# Patient Record
Sex: MALE | Race: WHITE | NOT HISPANIC OR LATINO
[De-identification: names, ages, dates, MRNs, and addresses within clinical notes are randomized per-mention and may not be internally consistent; named-entity substitution may affect disease eponyms.]

---

## 2017-01-27 PROBLEM — Z00.00 ENCOUNTER FOR PREVENTIVE HEALTH EXAMINATION: Status: ACTIVE | Noted: 2017-01-27

## 2017-01-31 ENCOUNTER — APPOINTMENT (OUTPATIENT)
Dept: POPULATION HEALTH | Facility: CLINIC | Age: 57
End: 2017-01-31

## 2017-02-15 ENCOUNTER — LABORATORY RESULT (OUTPATIENT)
Age: 57
End: 2017-02-15

## 2017-02-16 ENCOUNTER — APPOINTMENT (OUTPATIENT)
Dept: POPULATION HEALTH | Facility: CLINIC | Age: 57
End: 2017-02-16

## 2017-02-16 DIAGNOSIS — R80.9 PROTEINURIA, UNSPECIFIED: ICD-10-CM

## 2018-02-02 ENCOUNTER — APPOINTMENT (OUTPATIENT)
Dept: PULMONOLOGY | Facility: CLINIC | Age: 58
End: 2018-02-02
Payer: COMMERCIAL

## 2018-02-02 VITALS
OXYGEN SATURATION: 92 % | HEIGHT: 72 IN | HEART RATE: 112 BPM | TEMPERATURE: 97.3 F | DIASTOLIC BLOOD PRESSURE: 80 MMHG | RESPIRATION RATE: 16 BRPM | SYSTOLIC BLOOD PRESSURE: 120 MMHG | BODY MASS INDEX: 28.17 KG/M2 | WEIGHT: 208 LBS

## 2018-02-02 PROCEDURE — 99205 OFFICE O/P NEW HI 60 MIN: CPT | Mod: GC

## 2018-03-02 ENCOUNTER — OUTPATIENT (OUTPATIENT)
Dept: OUTPATIENT SERVICES | Facility: HOSPITAL | Age: 58
LOS: 1 days | End: 2018-03-02
Payer: COMMERCIAL

## 2018-03-02 ENCOUNTER — APPOINTMENT (OUTPATIENT)
Dept: SLEEP CENTER | Facility: CLINIC | Age: 58
End: 2018-03-02
Payer: COMMERCIAL

## 2018-03-02 PROCEDURE — 95810 POLYSOM 6/> YRS 4/> PARAM: CPT | Mod: 26

## 2018-03-02 PROCEDURE — 95810 POLYSOM 6/> YRS 4/> PARAM: CPT

## 2018-03-05 DIAGNOSIS — G47.33 OBSTRUCTIVE SLEEP APNEA (ADULT) (PEDIATRIC): ICD-10-CM

## 2018-03-06 ENCOUNTER — RESULT REVIEW (OUTPATIENT)
Age: 58
End: 2018-03-06

## 2019-06-07 ENCOUNTER — APPOINTMENT (OUTPATIENT)
Dept: INFECTIOUS DISEASE | Facility: CLINIC | Age: 59
End: 2019-06-07

## 2019-06-15 ENCOUNTER — EMERGENCY (EMERGENCY)
Facility: HOSPITAL | Age: 59
LOS: 1 days | Discharge: HOME | End: 2019-06-15
Attending: EMERGENCY MEDICINE
Payer: COMMERCIAL

## 2019-06-15 VITALS
SYSTOLIC BLOOD PRESSURE: 114 MMHG | DIASTOLIC BLOOD PRESSURE: 71 MMHG | TEMPERATURE: 99 F | RESPIRATION RATE: 18 BRPM | OXYGEN SATURATION: 95 % | HEART RATE: 95 BPM

## 2019-06-15 VITALS
HEART RATE: 95 BPM | WEIGHT: 175.05 LBS | TEMPERATURE: 98 F | SYSTOLIC BLOOD PRESSURE: 86 MMHG | HEIGHT: 72 IN | RESPIRATION RATE: 17 BRPM | OXYGEN SATURATION: 98 % | DIASTOLIC BLOOD PRESSURE: 51 MMHG

## 2019-06-15 LAB
ALBUMIN SERPL ELPH-MCNC: 3.6 G/DL — SIGNIFICANT CHANGE UP (ref 3.3–5)
ALP SERPL-CCNC: 136 U/L — HIGH (ref 40–120)
ALT FLD-CCNC: 71 U/L — HIGH (ref 10–45)
ANION GAP SERPL CALC-SCNC: 14 MMOL/L — SIGNIFICANT CHANGE UP (ref 5–17)
APPEARANCE UR: ABNORMAL
APTT BLD: 31 SEC — SIGNIFICANT CHANGE UP (ref 27.5–36.3)
AST SERPL-CCNC: 75 U/L — HIGH (ref 10–40)
BACTERIA # UR AUTO: NEGATIVE — SIGNIFICANT CHANGE UP
BASOPHILS # BLD AUTO: 0 K/UL — SIGNIFICANT CHANGE UP (ref 0–0.2)
BASOPHILS NFR BLD AUTO: 0.4 % — SIGNIFICANT CHANGE UP (ref 0–2)
BILIRUB SERPL-MCNC: 0.3 MG/DL — SIGNIFICANT CHANGE UP (ref 0.2–1.2)
BILIRUB UR-MCNC: NEGATIVE — SIGNIFICANT CHANGE UP
BUN SERPL-MCNC: 32 MG/DL — HIGH (ref 7–23)
CALCIUM SERPL-MCNC: 10.7 MG/DL — HIGH (ref 8.4–10.5)
CHLORIDE SERPL-SCNC: 101 MMOL/L — SIGNIFICANT CHANGE UP (ref 96–108)
CO2 SERPL-SCNC: 24 MMOL/L — SIGNIFICANT CHANGE UP (ref 22–31)
COLOR SPEC: YELLOW — SIGNIFICANT CHANGE UP
CREAT SERPL-MCNC: 1.77 MG/DL — HIGH (ref 0.5–1.3)
DIFF PNL FLD: ABNORMAL
EOSINOPHIL # BLD AUTO: 0.1 K/UL — SIGNIFICANT CHANGE UP (ref 0–0.5)
EOSINOPHIL NFR BLD AUTO: 1.1 % — SIGNIFICANT CHANGE UP (ref 0–6)
EPI CELLS # UR: 6 — HIGH
GAS PNL BLDV: SIGNIFICANT CHANGE UP
GLUCOSE SERPL-MCNC: 121 MG/DL — HIGH (ref 70–99)
GLUCOSE UR QL: NEGATIVE — SIGNIFICANT CHANGE UP
GRAN CASTS # UR COMP ASSIST: 4 /LPF — SIGNIFICANT CHANGE UP
HCT VFR BLD CALC: 29.3 % — LOW (ref 39–50)
HGB BLD-MCNC: 9.8 G/DL — LOW (ref 13–17)
HYALINE CASTS # UR AUTO: 13 /LPF — HIGH (ref 0–7)
INR BLD: 1.26 RATIO — HIGH (ref 0.88–1.16)
KETONES UR-MCNC: NEGATIVE — SIGNIFICANT CHANGE UP
LEUKOCYTE ESTERASE UR-ACNC: ABNORMAL
LYMPHOCYTES # BLD AUTO: 1.1 K/UL — SIGNIFICANT CHANGE UP (ref 1–3.3)
LYMPHOCYTES # BLD AUTO: 12.8 % — LOW (ref 13–44)
MCHC RBC-ENTMCNC: 31.1 PG — SIGNIFICANT CHANGE UP (ref 27–34)
MCHC RBC-ENTMCNC: 33.5 GM/DL — SIGNIFICANT CHANGE UP (ref 32–36)
MCV RBC AUTO: 92.8 FL — SIGNIFICANT CHANGE UP (ref 80–100)
MONOCYTES # BLD AUTO: 1.1 K/UL — HIGH (ref 0–0.9)
MONOCYTES NFR BLD AUTO: 13 % — SIGNIFICANT CHANGE UP (ref 2–14)
NEUTROPHILS # BLD AUTO: 6.2 K/UL — SIGNIFICANT CHANGE UP (ref 1.8–7.4)
NEUTROPHILS NFR BLD AUTO: 72.7 % — SIGNIFICANT CHANGE UP (ref 43–77)
NITRITE UR-MCNC: NEGATIVE — SIGNIFICANT CHANGE UP
PH UR: 6.5 — SIGNIFICANT CHANGE UP (ref 5–8)
PLATELET # BLD AUTO: 203 K/UL — SIGNIFICANT CHANGE UP (ref 150–400)
POTASSIUM SERPL-MCNC: 4.9 MMOL/L — SIGNIFICANT CHANGE UP (ref 3.5–5.3)
POTASSIUM SERPL-SCNC: 4.9 MMOL/L — SIGNIFICANT CHANGE UP (ref 3.5–5.3)
PROT SERPL-MCNC: 6.9 G/DL — SIGNIFICANT CHANGE UP (ref 6–8.3)
PROT UR-MCNC: 100 — SIGNIFICANT CHANGE UP
PROTHROM AB SERPL-ACNC: 14.5 SEC — HIGH (ref 10–12.9)
RBC # BLD: 3.15 M/UL — LOW (ref 4.2–5.8)
RBC # FLD: 14.5 % — SIGNIFICANT CHANGE UP (ref 10.3–14.5)
RBC CASTS # UR COMP ASSIST: 19 /HPF — HIGH (ref 0–4)
SODIUM SERPL-SCNC: 139 MMOL/L — SIGNIFICANT CHANGE UP (ref 135–145)
SP GR SPEC: 1.02 — SIGNIFICANT CHANGE UP (ref 1.01–1.02)
UROBILINOGEN FLD QL: NEGATIVE — SIGNIFICANT CHANGE UP
WBC # BLD: 8.5 K/UL — SIGNIFICANT CHANGE UP (ref 3.8–10.5)
WBC # FLD AUTO: 8.5 K/UL — SIGNIFICANT CHANGE UP (ref 3.8–10.5)
WBC UR QL: 14 /HPF — HIGH (ref 0–5)

## 2019-06-15 PROCEDURE — 81001 URINALYSIS AUTO W/SCOPE: CPT

## 2019-06-15 PROCEDURE — 93005 ELECTROCARDIOGRAM TRACING: CPT

## 2019-06-15 PROCEDURE — 71045 X-RAY EXAM CHEST 1 VIEW: CPT | Mod: 26

## 2019-06-15 PROCEDURE — 93010 ELECTROCARDIOGRAM REPORT: CPT

## 2019-06-15 PROCEDURE — 71045 X-RAY EXAM CHEST 1 VIEW: CPT

## 2019-06-15 PROCEDURE — 85730 THROMBOPLASTIN TIME PARTIAL: CPT

## 2019-06-15 PROCEDURE — 82330 ASSAY OF CALCIUM: CPT

## 2019-06-15 PROCEDURE — 82435 ASSAY OF BLOOD CHLORIDE: CPT

## 2019-06-15 PROCEDURE — 80053 COMPREHEN METABOLIC PANEL: CPT

## 2019-06-15 PROCEDURE — 84132 ASSAY OF SERUM POTASSIUM: CPT

## 2019-06-15 PROCEDURE — 82947 ASSAY GLUCOSE BLOOD QUANT: CPT

## 2019-06-15 PROCEDURE — 85014 HEMATOCRIT: CPT

## 2019-06-15 PROCEDURE — 85027 COMPLETE CBC AUTOMATED: CPT

## 2019-06-15 PROCEDURE — 87086 URINE CULTURE/COLONY COUNT: CPT

## 2019-06-15 PROCEDURE — 99284 EMERGENCY DEPT VISIT MOD MDM: CPT | Mod: 25

## 2019-06-15 PROCEDURE — 83605 ASSAY OF LACTIC ACID: CPT

## 2019-06-15 PROCEDURE — 82803 BLOOD GASES ANY COMBINATION: CPT

## 2019-06-15 PROCEDURE — 87040 BLOOD CULTURE FOR BACTERIA: CPT

## 2019-06-15 PROCEDURE — 85610 PROTHROMBIN TIME: CPT

## 2019-06-15 PROCEDURE — 84295 ASSAY OF SERUM SODIUM: CPT

## 2019-06-15 RX ORDER — SODIUM CHLORIDE 9 MG/ML
2500 INJECTION INTRAMUSCULAR; INTRAVENOUS; SUBCUTANEOUS ONCE
Refills: 0 | Status: COMPLETED | OUTPATIENT
Start: 2019-06-15 | End: 2019-06-15

## 2019-06-15 RX ADMIN — SODIUM CHLORIDE 2500 MILLILITER(S): 9 INJECTION INTRAMUSCULAR; INTRAVENOUS; SUBCUTANEOUS at 12:58

## 2019-06-15 NOTE — ED PROVIDER NOTE - PROGRESS NOTE DETAILS
Spoke with Pt's Sister in Law, Dawn Vidal ([phone]). Pt has history of CHI (EF 24%), otherwise history as per reported from [facility]. Agrees most likely cause of NAGA medication induced Spoke with Pt's Sister in Law, Dawn ([phone]). Pt has history of CHI (EF 24%), otherwise history as per reported from [facility]. Agrees most likely cause of NAGA medication induced Ana Mc, DO: D/w Dr. Coleman from [facility] who is covering physician for the weekend. Low clinical concern for obstructive uropathy as patient making appropriate urine. Dr. Coleman to accept patient back. Will d/c with transport to [facility]. Patient and family aware. Ana Mc, DO: D/w Dr. Coleman from [facility] who is covering physician for the weekend. Low clinical concern for obstructive uropathy as patient making appropriate urine. Discussed dc Metoprolol as since start, patient mildly hypotensive. Dr. Coleman to accept patient back. Will d/c with transport to [facility]. Patient and family aware.

## 2019-06-15 NOTE — ED PROVIDER NOTE - CLINICAL SUMMARY MEDICAL DECISION MAKING FREE TEXT BOX
Ana Mc, DO: 59M with AML with hypotension likely related to BB use in setting of tachycardia sent for evaluation of NAGA treated with 75 ml/hour. Will eval for sepsis. NAGA possible 2/2 nephrotoxic agents given vancomycin and other medications such acyclovir. Ana Mc, DO: 59M with AML with hypotension likely related to BB use in setting of tachycardia sent for evaluation of NAGA treated with 75 ml/hour. Will eval for sepsis. NAGA possible 2/2 nephrotoxic agents given vancomycin and other medications such acyclovir.    Nadine Merchant MD - Attending Physician: Pt here with AML, mild hypotension (likely due to new beta blocker) sent in from [facility] for NAGA on labs. Pt asymptomatic, well appearing, normal UOP. Likely medication induced. Eval for infection, d/w PMD

## 2019-06-15 NOTE — ED PROVIDER NOTE - PHYSICAL EXAMINATION
Ana Mc, DO:   SBP 99 with tachycardia to 104, VS WNL otherwise.    Gen: chronically ill appearing, NAD  Head: NCAT  HEENT: PERRL, MMM, normal conjunctiva, anicteric, neck supple  Lung: CTAB, no adventitious sounds, no tachypnea  CV: tachycardia with regular rate  Abd: soft, NTND, no rebound or guarding  MSK: No edema, no visible deformities, wound of RLE dressed in boot.   Neuro: Moving all extremities equally  Skin: Warm and dry, no evidence of rash  Psych: normal mood and affect

## 2019-06-15 NOTE — ED PROVIDER NOTE - ATTENDING CONTRIBUTION TO CARE
Nadine Merchant MD - Attending Physician: I have personally seen and examined this patient with the resident/fellow.  I have fully participated in the care of this patient. I have reviewed all pertinent clinical information, including history, physical exam, plan and the Resident/Fellow’s note and agree except as noted. See MDM

## 2019-06-15 NOTE — ED PROVIDER NOTE - OBJECTIVE STATEMENT
Ana Mc, DO: 59M poor historian with hx of AML with plan for BM transplant currently at [facility] for osteomyelitis sent for evaluation of NAGA. Patient with indwelling saucedo for reasons that the patient does not recall. Denies fever, nausea/vomiting, CP/SOB, cough, change in urine color, abd pain, diarrhea. Per chart review, patient received 1 dose of Toprol 2/2 high heart rate but no hx of HTN or use of antihypertensives.

## 2019-06-15 NOTE — ED ADULT NURSE NOTE - OBJECTIVE STATEMENT
59YOM pmh COPD, leukemia, HTN presents to ED c/o abnormal BUN/creatnine and hypotension from [facility]. Pt has chronic saucedo, per pt normal output. Pt afebrile in the ED. Pt hypotensive 99/57, ususally BP per pt is around 127/70. Pt has been getting vanco at [facility] for toe infection.  Denies CP. fever, chills, SOB, weakness, HA, weakness, N/V/D, abd pain, urinary symptoms. Safety and comfort measures provided. WIll continue to monitor.

## 2019-06-15 NOTE — ED PROVIDER NOTE - NSFOLLOWUPINSTRUCTIONS_ED_ALL_ED_FT
1. You have an NAGA. This was determined not to be due to an obstruction as your urine is draining freely. It is thought to be medication induced.  2. Please follow with your hematologist/oncologist.   3. Please return to the ED should you have any new or worsening symptoms or have any new concerns.   4. Read all attached.

## 2019-06-16 LAB
CULTURE RESULTS: NO GROWTH — SIGNIFICANT CHANGE UP
SPECIMEN SOURCE: SIGNIFICANT CHANGE UP

## 2019-06-17 ENCOUNTER — APPOINTMENT (OUTPATIENT)
Dept: UROLOGY | Facility: CLINIC | Age: 59
End: 2019-06-17
Payer: COMMERCIAL

## 2019-06-17 VITALS
SYSTOLIC BLOOD PRESSURE: 104 MMHG | TEMPERATURE: 98.2 F | RESPIRATION RATE: 16 BRPM | HEIGHT: 72 IN | BODY MASS INDEX: 23.03 KG/M2 | WEIGHT: 170 LBS | HEART RATE: 108 BPM | DIASTOLIC BLOOD PRESSURE: 71 MMHG

## 2019-06-17 DIAGNOSIS — Z85.6 PERSONAL HISTORY OF LEUKEMIA: ICD-10-CM

## 2019-06-17 DIAGNOSIS — R33.9 RETENTION OF URINE, UNSPECIFIED: ICD-10-CM

## 2019-06-17 PROCEDURE — 99204 OFFICE O/P NEW MOD 45 MIN: CPT

## 2019-06-17 NOTE — ASSESSMENT
[FreeTextEntry1] : \par \par Impression/plan: 59 year old male s/p chemotherapy for leukemia who had inpatient urinary retention for ~500cc s/p failed voiding trials. Has been on finasteride and tamsulosin for several weeks now. It is late in the afternoon and he prefers to have morning voiding trial to decrease risk of going to the ER at night time. \par \par 1. Void trial at facility, preferably in the AM.\par 2. Continue tamsulosin and finasteride. \par 3. If fails, will arrange for UDS.

## 2019-06-17 NOTE — PHYSICAL EXAM
[General Appearance - Well Developed] : well developed [General Appearance - Well Nourished] : well nourished [Normal Appearance] : normal appearance [Well Groomed] : well groomed [General Appearance - In No Acute Distress] : no acute distress [Heart Rate And Rhythm] : Heart rate and rhythm were normal [Edema] : no peripheral edema [Respiration, Rhythm And Depth] : normal respiratory rhythm and effort [Exaggerated Use Of Accessory Muscles For Inspiration] : no accessory muscle use [Abdomen Soft] : soft [Abdomen Tenderness] : non-tender [Costovertebral Angle Tenderness] : no ~M costovertebral angle tenderness [Urethral Meatus] : meatus normal [Urinary Bladder Findings] : the bladder was normal on palpation [Scrotum] : the scrotum was normal [Testes Mass (___cm)] : there were no testicular masses [No Prostate Nodules] : no prostate nodules [Normal Station and Gait] : the gait and station were normal for the patient's age [Oriented To Time, Place, And Person] : oriented to person, place, and time [No Focal Deficits] : no focal deficits [] : no rash [Mood] : the mood was normal [Affect] : the affect was normal [Not Anxious] : not anxious [No Palpable Adenopathy] : no palpable adenopathy [FreeTextEntry1] : 16 fr coude catheter in place. KERON small benign.

## 2019-06-17 NOTE — REVIEW OF SYSTEMS
[Feeling Tired] : feeling tired [Recent Weight Loss (___ Lbs)] : recent [unfilled] ~Ulb weight loss [Vomiting] : vomiting [Constipation] : constipation [Loss of interest] : loss of interest in sexual activity [Skin Lesions] : skin lesion [Skin Wound] : skin wound [Anxiety] : anxiety [Limb Weakness] : limb weakness [Difficulty Walking] : difficulty walking [Depression] : depression [Negative] : Psychiatric

## 2019-06-17 NOTE — HISTORY OF PRESENT ILLNESS
[FreeTextEntry1] : 59 year old male with leukemia diagnosed 8 years ago. He spent 6 weeks at [facility] as an inpatient for chemotherapy with wound complications. He was found to have urinary retention and a catheter was left indwelling for the past 6 weeks. He thinks he was retaining for ~500cc. Prior to leukemia diagnosis, he reports some urinary difficulty. He reports 1-2 voids/day, incomplete emptying, and nocturia 1x. Denies stress incontinence. During hospitalization he has been on flomax and finasteride. He failed several voiding trials during hospitalization.

## 2019-06-20 LAB
CULTURE RESULTS: SIGNIFICANT CHANGE UP
CULTURE RESULTS: SIGNIFICANT CHANGE UP
SPECIMEN SOURCE: SIGNIFICANT CHANGE UP
SPECIMEN SOURCE: SIGNIFICANT CHANGE UP

## 2019-06-24 ENCOUNTER — APPOINTMENT (OUTPATIENT)
Dept: UROLOGY | Facility: CLINIC | Age: 59
End: 2019-06-24

## 2019-06-28 ENCOUNTER — OUTPATIENT (OUTPATIENT)
Dept: OUTPATIENT SERVICES | Facility: HOSPITAL | Age: 59
LOS: 1 days | Discharge: HOME | End: 2019-06-28

## 2019-06-28 DIAGNOSIS — C92.00 ACUTE MYELOBLASTIC LEUKEMIA, NOT HAVING ACHIEVED REMISSION: ICD-10-CM

## 2019-07-01 ENCOUNTER — OUTPATIENT (OUTPATIENT)
Dept: OUTPATIENT SERVICES | Facility: HOSPITAL | Age: 59
LOS: 1 days | End: 2019-07-01
Payer: COMMERCIAL

## 2019-07-01 ENCOUNTER — RESULT REVIEW (OUTPATIENT)
Age: 59
End: 2019-07-01

## 2019-07-01 ENCOUNTER — APPOINTMENT (OUTPATIENT)
Dept: HEMATOLOGY ONCOLOGY | Facility: CLINIC | Age: 59
End: 2019-07-01
Payer: COMMERCIAL

## 2019-07-01 VITALS
HEART RATE: 122 BPM | SYSTOLIC BLOOD PRESSURE: 133 MMHG | OXYGEN SATURATION: 98 % | RESPIRATION RATE: 16 BRPM | WEIGHT: 173.06 LBS | DIASTOLIC BLOOD PRESSURE: 84 MMHG | TEMPERATURE: 98 F | BODY MASS INDEX: 23.44 KG/M2 | HEIGHT: 72.01 IN

## 2019-07-01 DIAGNOSIS — C92.00 ACUTE MYELOBLASTIC LEUKEMIA, NOT HAVING ACHIEVED REMISSION: ICD-10-CM

## 2019-07-01 LAB
BASOPHILS # BLD AUTO: 0 K/UL — SIGNIFICANT CHANGE UP (ref 0–0.2)
BASOPHILS NFR BLD AUTO: 0.4 % — SIGNIFICANT CHANGE UP (ref 0–2)
EOSINOPHIL # BLD AUTO: 0.1 K/UL — SIGNIFICANT CHANGE UP (ref 0–0.5)
EOSINOPHIL NFR BLD AUTO: 1.6 % — SIGNIFICANT CHANGE UP (ref 0–6)
HCT VFR BLD CALC: 28.4 % — LOW (ref 39–50)
HGB BLD-MCNC: 9.6 G/DL — LOW (ref 13–17)
LYMPHOCYTES # BLD AUTO: 2.1 K/UL — SIGNIFICANT CHANGE UP (ref 1–3.3)
LYMPHOCYTES # BLD AUTO: 30.5 % — SIGNIFICANT CHANGE UP (ref 13–44)
MCHC RBC-ENTMCNC: 32.1 PG — SIGNIFICANT CHANGE UP (ref 27–34)
MCHC RBC-ENTMCNC: 33.9 G/DL — SIGNIFICANT CHANGE UP (ref 32–36)
MCV RBC AUTO: 94.8 FL — SIGNIFICANT CHANGE UP (ref 80–100)
MONOCYTES # BLD AUTO: 0.6 K/UL — SIGNIFICANT CHANGE UP (ref 0–0.9)
MONOCYTES NFR BLD AUTO: 9.4 % — SIGNIFICANT CHANGE UP (ref 2–14)
NEUTROPHILS # BLD AUTO: 3.9 K/UL — SIGNIFICANT CHANGE UP (ref 1.8–7.4)
NEUTROPHILS NFR BLD AUTO: 58.2 % — SIGNIFICANT CHANGE UP (ref 43–77)
PLATELET # BLD AUTO: 99 K/UL — LOW (ref 150–400)
RBC # BLD: 3 M/UL — LOW (ref 4.2–5.8)
RBC # FLD: 16.4 % — HIGH (ref 10.3–14.5)
WBC # BLD: 6.8 K/UL — SIGNIFICANT CHANGE UP (ref 3.8–10.5)
WBC # FLD AUTO: 6.8 K/UL — SIGNIFICANT CHANGE UP (ref 3.8–10.5)

## 2019-07-01 PROCEDURE — 88291 CYTO/MOLECULAR REPORT: CPT

## 2019-07-01 PROCEDURE — 88275 CYTOGENETICS 100-300: CPT

## 2019-07-01 PROCEDURE — 88184 FLOWCYTOMETRY/ TC 1 MARKER: CPT

## 2019-07-01 PROCEDURE — 87205 SMEAR GRAM STAIN: CPT

## 2019-07-01 PROCEDURE — 88189 FLOWCYTOMETRY/READ 16 & >: CPT

## 2019-07-01 PROCEDURE — 88237 TISSUE CULTURE BONE MARROW: CPT

## 2019-07-01 PROCEDURE — 88271 CYTOGENETICS DNA PROBE: CPT

## 2019-07-01 PROCEDURE — 88264 CHROMOSOME ANALYSIS 20-25: CPT

## 2019-07-01 PROCEDURE — 88280 CHROMOSOME KARYOTYPE STUDY: CPT

## 2019-07-01 PROCEDURE — XXXXX: CPT

## 2019-07-01 PROCEDURE — 88185 FLOWCYTOMETRY/TC ADD-ON: CPT

## 2019-07-01 NOTE — HISTORY OF PRESENT ILLNESS
[de-identified] : This is a 60 y/o male present for an evaluation of a previously diagnosed acute myeloid leukemia. Pt spent 6 weeks in [place], receiving chemotherapy. Wound complications with treatment included toe infection, fungal infection on right cheek, and high fever(105). Pt has a history of multiple blood clots - factor V Leiden runs in family. His most recent clotting - In 2/2019, follow up w/ his regular hematologist revealed everything to be stable. Pt traveled for one month to [place] and [place]. In 4/2019, he returned to his hematologist complaining of SOB. Hewas rushed to the hospital, where they discovered low platelets, but pt states he discharged himself. Pt states his clotting was managed w/ Coumadin in the past, but due to adverse effects, is now currently taking Xarelto. Otherwise, he has no other cardiovascular issues. Pt diagnosed w/ sleep apnea, but refuses to use CPAP. Last colonoscopy completed 10 years ago. Former smoker, 40 years. No longer consumes alcohol, but in the past consumed multiple alcoholic beverages a day.\par Today he presents with 2 family members. He is ambulating via walker. He has completed 3 weeks of rehab at [place] on 6/27. He complains of burning sensation w/ urination. Appetite has been good, gained 5 pounds in a few days, but states taste is impaired.

## 2019-07-01 NOTE — REVIEW OF SYSTEMS
[Recent Change In Weight] : ~T recent weight change [Dysuria] : dysuria [Skin Wound] : skin wound [Negative] : Allergic/Immunologic [FreeTextEntry2] : impaired taste, gain 5 pounds. [FreeTextEntry8] : burning sensation w/ urination. [de-identified] : toe infection.

## 2019-07-01 NOTE — ADDENDUM
[FreeTextEntry1] : Documented by Sb Feng acting as a scribe for Dr. Debbi Coelho on 7/1/2019.\par \par All medical record entries made by the Scribe were at my, Dr. Debbi Coelho's, direction and personally dictated by me on 7/1/2019. I have reviewed the chart and agree that  the record accurately reflects my personal performance of the history, physical exam, assessment and plan. I have also personally directed, reviewed, and agree with the discharge instructions.\par

## 2019-07-01 NOTE — ASSESSMENT
[FreeTextEntry1] : \par Bone marrow biopsy today.\par Need to arrange wound care evaluation.\par Will also reach out to infectious disease.\par MRI in hospital when admitted.\par Bone marrow transplant evaluation.

## 2019-07-02 ENCOUNTER — RESULT REVIEW (OUTPATIENT)
Age: 59
End: 2019-07-02

## 2019-07-02 LAB — TM INTERPRETATION: SIGNIFICANT CHANGE UP

## 2019-07-03 ENCOUNTER — APPOINTMENT (OUTPATIENT)
Dept: CARDIOLOGY | Facility: CLINIC | Age: 59
End: 2019-07-03
Payer: COMMERCIAL

## 2019-07-03 ENCOUNTER — RESULT REVIEW (OUTPATIENT)
Age: 59
End: 2019-07-03

## 2019-07-03 LAB — HEMATOPATHOLOGY REPORT: SIGNIFICANT CHANGE UP

## 2019-07-03 PROCEDURE — 93306 TTE W/DOPPLER COMPLETE: CPT

## 2019-07-11 ENCOUNTER — INPATIENT (INPATIENT)
Facility: HOSPITAL | Age: 59
LOS: 4 days | Discharge: HOME | DRG: 540 | End: 2019-07-16
Attending: INTERNAL MEDICINE | Admitting: INTERNAL MEDICINE
Payer: COMMERCIAL

## 2019-07-11 VITALS
WEIGHT: 178.79 LBS | HEART RATE: 80 BPM | SYSTOLIC BLOOD PRESSURE: 116 MMHG | HEIGHT: 73 IN | RESPIRATION RATE: 18 BRPM | TEMPERATURE: 96 F | DIASTOLIC BLOOD PRESSURE: 76 MMHG

## 2019-07-11 DIAGNOSIS — C92.00 ACUTE MYELOBLASTIC LEUKEMIA, NOT HAVING ACHIEVED REMISSION: ICD-10-CM

## 2019-07-11 DIAGNOSIS — Z86.79 PERSONAL HISTORY OF OTHER DISEASES OF THE CIRCULATORY SYSTEM: ICD-10-CM

## 2019-07-11 DIAGNOSIS — Z29.9 ENCOUNTER FOR PROPHYLACTIC MEASURES, UNSPECIFIED: ICD-10-CM

## 2019-07-11 DIAGNOSIS — B99.9 UNSPECIFIED INFECTIOUS DISEASE: ICD-10-CM

## 2019-07-11 DIAGNOSIS — D68.51 ACTIVATED PROTEIN C RESISTANCE: ICD-10-CM

## 2019-07-11 DIAGNOSIS — C95.90 LEUKEMIA, UNSPECIFIED NOT HAVING ACHIEVED REMISSION: ICD-10-CM

## 2019-07-11 DIAGNOSIS — M86.9 OSTEOMYELITIS, UNSPECIFIED: ICD-10-CM

## 2019-07-11 DIAGNOSIS — B44.9 ASPERGILLOSIS, UNSPECIFIED: ICD-10-CM

## 2019-07-11 LAB
ALBUMIN SERPL ELPH-MCNC: 3.8 G/DL — SIGNIFICANT CHANGE UP (ref 3.3–5)
ALP SERPL-CCNC: 85 U/L — SIGNIFICANT CHANGE UP (ref 40–120)
ALT FLD-CCNC: 17 U/L — SIGNIFICANT CHANGE UP (ref 10–45)
ANION GAP SERPL CALC-SCNC: 12 MMOL/L — SIGNIFICANT CHANGE UP (ref 5–17)
APTT BLD: 27.4 SEC — LOW (ref 27.5–36.3)
AST SERPL-CCNC: 18 U/L — SIGNIFICANT CHANGE UP (ref 10–40)
BASOPHILS # BLD AUTO: 0 K/UL — SIGNIFICANT CHANGE UP (ref 0–0.2)
BASOPHILS NFR BLD AUTO: 0.2 % — SIGNIFICANT CHANGE UP (ref 0–2)
BILIRUB SERPL-MCNC: 0.3 MG/DL — SIGNIFICANT CHANGE UP (ref 0.2–1.2)
BLD GP AB SCN SERPL QL: NEGATIVE — SIGNIFICANT CHANGE UP
BUN SERPL-MCNC: 26 MG/DL — HIGH (ref 7–23)
CALCIUM SERPL-MCNC: 9.1 MG/DL — SIGNIFICANT CHANGE UP (ref 8.4–10.5)
CHLORIDE SERPL-SCNC: 103 MMOL/L — SIGNIFICANT CHANGE UP (ref 96–108)
CO2 SERPL-SCNC: 24 MMOL/L — SIGNIFICANT CHANGE UP (ref 22–31)
CREAT SERPL-MCNC: 1.45 MG/DL — HIGH (ref 0.5–1.3)
EOSINOPHIL # BLD AUTO: 0.1 K/UL — SIGNIFICANT CHANGE UP (ref 0–0.5)
EOSINOPHIL NFR BLD AUTO: 2.4 % — SIGNIFICANT CHANGE UP (ref 0–6)
GLUCOSE SERPL-MCNC: 122 MG/DL — HIGH (ref 70–99)
HCT VFR BLD CALC: 25.6 % — LOW (ref 39–50)
HGB BLD-MCNC: 8.9 G/DL — LOW (ref 13–17)
INR BLD: 1.08 RATIO — SIGNIFICANT CHANGE UP (ref 0.88–1.16)
LDH SERPL L TO P-CCNC: 180 U/L — SIGNIFICANT CHANGE UP (ref 50–242)
LYMPHOCYTES # BLD AUTO: 2.3 K/UL — SIGNIFICANT CHANGE UP (ref 1–3.3)
LYMPHOCYTES # BLD AUTO: 36.8 % — SIGNIFICANT CHANGE UP (ref 13–44)
MAGNESIUM SERPL-MCNC: 1.8 MG/DL — SIGNIFICANT CHANGE UP (ref 1.6–2.6)
MCHC RBC-ENTMCNC: 33.8 PG — SIGNIFICANT CHANGE UP (ref 27–34)
MCHC RBC-ENTMCNC: 34.8 GM/DL — SIGNIFICANT CHANGE UP (ref 32–36)
MCV RBC AUTO: 97.3 FL — SIGNIFICANT CHANGE UP (ref 80–100)
MONOCYTES # BLD AUTO: 0.4 K/UL — SIGNIFICANT CHANGE UP (ref 0–0.9)
MONOCYTES NFR BLD AUTO: 7.2 % — SIGNIFICANT CHANGE UP (ref 2–14)
NEUTROPHILS # BLD AUTO: 3.3 K/UL — SIGNIFICANT CHANGE UP (ref 1.8–7.4)
NEUTROPHILS NFR BLD AUTO: 53.5 % — SIGNIFICANT CHANGE UP (ref 43–77)
PHOSPHATE SERPL-MCNC: 3.9 MG/DL — SIGNIFICANT CHANGE UP (ref 2.5–4.5)
PLATELET # BLD AUTO: 82 K/UL — LOW (ref 150–400)
POTASSIUM SERPL-MCNC: 4.6 MMOL/L — SIGNIFICANT CHANGE UP (ref 3.5–5.3)
POTASSIUM SERPL-SCNC: 4.6 MMOL/L — SIGNIFICANT CHANGE UP (ref 3.5–5.3)
PROT SERPL-MCNC: 6 G/DL — SIGNIFICANT CHANGE UP (ref 6–8.3)
PROTHROM AB SERPL-ACNC: 12.3 SEC — SIGNIFICANT CHANGE UP (ref 10–12.9)
RBC # BLD: 2.63 M/UL — LOW (ref 4.2–5.8)
RBC # FLD: 16.6 % — HIGH (ref 10.3–14.5)
RH IG SCN BLD-IMP: POSITIVE — SIGNIFICANT CHANGE UP
SODIUM SERPL-SCNC: 139 MMOL/L — SIGNIFICANT CHANGE UP (ref 135–145)
URATE SERPL-MCNC: 6.4 MG/DL — SIGNIFICANT CHANGE UP (ref 3.4–8.8)
WBC # BLD: 6.2 K/UL — SIGNIFICANT CHANGE UP (ref 3.8–10.5)
WBC # FLD AUTO: 6.2 K/UL — SIGNIFICANT CHANGE UP (ref 3.8–10.5)

## 2019-07-11 PROCEDURE — 93010 ELECTROCARDIOGRAM REPORT: CPT

## 2019-07-11 PROCEDURE — 99223 1ST HOSP IP/OBS HIGH 75: CPT

## 2019-07-11 PROCEDURE — 73718 MRI LOWER EXTREMITY W/O DYE: CPT | Mod: 26,RT

## 2019-07-11 RX ORDER — VORICONAZOLE 10 MG/ML
1 INJECTION, POWDER, LYOPHILIZED, FOR SOLUTION INTRAVENOUS
Qty: 0 | Refills: 0 | DISCHARGE
Start: 2019-07-11

## 2019-07-11 RX ORDER — METOPROLOL TARTRATE 50 MG
12.5 TABLET ORAL DAILY
Refills: 0 | Status: DISCONTINUED | OUTPATIENT
Start: 2019-07-11 | End: 2019-07-15

## 2019-07-11 RX ORDER — RIVAROXABAN 15 MG-20MG
1 KIT ORAL
Qty: 0 | Refills: 0 | DISCHARGE

## 2019-07-11 RX ORDER — RIVAROXABAN 15 MG-20MG
20 KIT ORAL DAILY
Refills: 0 | Status: DISCONTINUED | OUTPATIENT
Start: 2019-07-11 | End: 2019-07-13

## 2019-07-11 RX ORDER — VORICONAZOLE 10 MG/ML
1 INJECTION, POWDER, LYOPHILIZED, FOR SOLUTION INTRAVENOUS
Qty: 0 | Refills: 0 | DISCHARGE

## 2019-07-11 RX ORDER — CEFTRIAXONE 500 MG/1
1000 INJECTION, POWDER, FOR SOLUTION INTRAMUSCULAR; INTRAVENOUS EVERY 24 HOURS
Refills: 0 | Status: DISCONTINUED | OUTPATIENT
Start: 2019-07-11 | End: 2019-07-13

## 2019-07-11 RX ORDER — SODIUM CHLORIDE 9 MG/ML
1000 INJECTION INTRAMUSCULAR; INTRAVENOUS; SUBCUTANEOUS
Refills: 0 | Status: DISCONTINUED | OUTPATIENT
Start: 2019-07-11 | End: 2019-07-16

## 2019-07-11 RX ORDER — VORICONAZOLE 10 MG/ML
200 INJECTION, POWDER, LYOPHILIZED, FOR SOLUTION INTRAVENOUS EVERY 12 HOURS
Refills: 0 | Status: DISCONTINUED | OUTPATIENT
Start: 2019-07-11 | End: 2019-07-16

## 2019-07-11 RX ORDER — SERTRALINE 25 MG/1
25 TABLET, FILM COATED ORAL DAILY
Refills: 0 | Status: DISCONTINUED | OUTPATIENT
Start: 2019-07-11 | End: 2019-07-15

## 2019-07-11 RX ORDER — NICOTINE POLACRILEX 2 MG
1 GUM BUCCAL DAILY
Refills: 0 | Status: DISCONTINUED | OUTPATIENT
Start: 2019-07-11 | End: 2019-07-16

## 2019-07-11 RX ADMIN — CEFTRIAXONE 100 MILLIGRAM(S): 500 INJECTION, POWDER, FOR SOLUTION INTRAMUSCULAR; INTRAVENOUS at 17:30

## 2019-07-11 RX ADMIN — VORICONAZOLE 200 MILLIGRAM(S): 10 INJECTION, POWDER, LYOPHILIZED, FOR SOLUTION INTRAVENOUS at 17:30

## 2019-07-11 RX ADMIN — SODIUM CHLORIDE 100 MILLILITER(S): 9 INJECTION INTRAMUSCULAR; INTRAVENOUS; SUBCUTANEOUS at 16:30

## 2019-07-11 NOTE — H&P ADULT - PROBLEM SELECTOR PLAN 2
h/o of fungal infection of cheek.   Continue with Voriconazole Continue with Voriconazole 2/2 h/o fungal infection of face

## 2019-07-11 NOTE — CHART NOTE - NSCHARTNOTEFT_GEN_A_CORE
Wound Care Team Note:    NP request for wound care consult on patient with foot wound. This request was referred to the wound care team Podiatrist, Dr. Estrada.    Kelli Andrade, YADIRA-C, [facility] [id number]

## 2019-07-11 NOTE — CONSULT NOTE ADULT - ASSESSMENT
Mr. Delong is a 59 year old male with AML previously on anthracycline based chemotheraphy, with a reported EF in the 20's at this time, who now presents for chemotherapy and evaluation of a toe infection.  More recently, he had an echocardiogram in our office 7/2019 with mild MR, eccentric LVH with moderate LV dysfunction (EF 35-40%), and mild aortic root dilatation.  He also has a history of Factor V Leiden and VTE, and has been on Xarelto.    - No sign of acute ischemia, nor does he have any chest pain. Please repeat EKG today. His prior was abnormal with a RBBB, LAD, RVH and non-specific st abn. He reports a cath about 5 years ago at [facility] that was unremarkable and a stress test more recently, though results of this are unavailable.  - No sign of volume overload. I would prefer to start him on Toprol XL 25 in the setting of lv dysfunction, though was can start at 1/2 the dose and see if his bp tolerates.  - He had NAGA during his last blood work available, so we need to hold off on ace-inhibitor, especially in the setting of borderline BP.  - MUGA is pending  - Continue Xarelto for history of VTE if platelets remain >50,000  - Watch creatinine and electrolytes. Keep K>4, Mg>2  - Will follow with you.
58 yo M presents with right hallux medial nail bed wound to bone   - pt was seen and evaluated   - VSS, no leukocytosis  - right hallux nail was detached, easily removed the nail, revealed medial nail bed wound to bone, no pus, no malodor. Ductylitis and local erythema noted  - ordered ESR, CRP, Xray and MRI for OM evaluation   - wound culture taken   - podiatry will follow   - d/w attending
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

## 2019-07-11 NOTE — H&P ADULT - PROBLEM SELECTOR PLAN 6
Admit for cycle 1 consolidation HIDAC  Monitor for cerebellar toxicity  pred forte eye drops  Monitor labs, replace blood and lytes PRN  Pain control, antiemetics, IV hydration

## 2019-07-11 NOTE — H&P ADULT - HISTORY OF PRESENT ILLNESS
58yo M with AML diagnosed in 4/2019 s/p induction chemotherapy now in   morphologic remission admitted for consolidation chemotherapy with cycle 1 HIDAC.    During hospitalization for induction chemotherapy at [facility], patient   developed wound complications including a toe infection and right cheek fungal   infection associated with gihg fevers of 105. After hospitalization, patient was   diacharged to Subacute rehab for 3 weeks and is now ambulating with a walker.   Patient also has a history of sleep apnea (refusing CPAP), prior use of etoh, blood   clots (family history of Factor V Leiden) previously on Coumadin but due to adverse   effects was changed to xarelto 58yo M with AML diagnosed in 4/2019 s/p induction chemotherapy now in   morphologic remission admitted for consolidation chemotherapy with cycle 1 HIDAC.    Patient was initially followed by Heme for factor V Leiden on Xarelto. Labs were normal at check up. Had increased SOB and had repeat labs done and was told to go to ER. Patient went to [facility] hospital due to insurance reasons. He was diagnosed with AML and underwent treated with induction chemotherapy7+3. Day 14 BMbx showed PD and patient was reinduced with 5+2 at which time he developed a wound on the right tow with concern for osteomyelitis as well asright cheek fungal  infection with aspergillosis and neutropenic fevers of 105. After hospitalization, patient was  discharged to Subacute rehab for 3 weeks. After discharge from rehab due to insurance reasons, the patient was seen at [facility] by Dr. Coelho. BMbx showed a CR and plan was made for consolidation chemo with HIDAC. Patient will need an Allo transplant and has and appointment with Dr. Gaytan in August. The patient is a poor historian but was able to state he had heart failure after the chemo and his EF dropped to 20%. Patient also has a history of sleep apnea (refusing CPAP), prior use of etoh, blood   clots (family history of Factor V Leiden).    effects was changed to xarelto 58yo M with AML diagnosed in 4/2019 s/p induction chemotherapy   now in morphologic remission admitted for consolidation chemotherapy   with cycle 1 HIDAC.    Patient was initially followed by [facility] for factor V Leiden on Xarelto.   Labs were normal at check up. Had increased SOB and had repeat labs   done and was told to go to ER. Patient went to [facility] hospital due to insurance   reasons. He was diagnosed with AML and underwent treated with induction   chemotherapy7+3. Day 14 BMbx showed PD and patient was reinduced   with 5+2 at which time he developed a wound on the right tow with concern   for osteomyelitis as well asright cheek fungal  infection with aspergillosis and neutropenic fevers of 105.   After hospitalization, patient wasdischarged to Subacute rehab for 3   weeks. After discharge from rehab due to insurance reasons, the   patient was seen at [facility] by Dr. Coelho. BMbx showed a CR and plan   was made for consolidation chemo with HIDAC. Patient will need an   Allo transplant and has and appointment with Dr. Gaytan in August.   The patient is a poor historian but was able to state he had heart failure   after the chemo and his EF dropped to 20%. Patient also has a history   of sleep apnea (refusing CPAP), prior use of etoh, blood   clots (family history of Factor V Leiden).    effects was changed to xarelto

## 2019-07-11 NOTE — CONSULT NOTE ADULT - PROBLEM SELECTOR RECOMMENDATION 9
Having failed Vanco patient certainly at risk of losing the toe, recommend ESR (will order), MRI of the toe, ceftriaxone 1 gram IV Q-day for now with further recs to follow as clinical picture is clarified. Would involve podiatry as high likelihood in this context for amputation being required

## 2019-07-11 NOTE — H&P ADULT - NSHPSOCIALHISTORY_GEN_ALL_CORE
Patient is  and lives with a significant other. He drank 6-8 drinks a day until recently. He is a current smoker but has been using a nicotine patch. He works as a  but on disability at this time and has 4 grown children.

## 2019-07-11 NOTE — CONSULT NOTE ADULT - SUBJECTIVE AND OBJECTIVE BOX
[facility] [place] [facility] - Ede Zamudio, Riki, Edgar, Gem, Tabitha Garcia  Office Number: [phone]    Initial Consult Note    CHIEF COMPLAINT: Patient is a 59y old  Male who presents for oncologic evaluation    HPI:  60yo M with AML diagnosed in 4/2019 s/p induction chemotherapy now in   morphologic remission admitted for consolidation chemotherapy with cycle 1 HIDAC.    During hospitalization for induction chemotherapy at [facility], patient   developed wound complications including a toe infection and right cheek fungal   infection associated with gihg fevers of 105. After hospitalization, patient was   diacharged to Subacute rehab for 3 weeks and is now ambulating with a walker.   Patient also has a history of sleep apnea (refusing CPAP), prior use of etoh, blood   clots (family history of Factor V Leiden) previously on Coumadin but due to adverse   effects was changed to xarelto (11 Jul 2019 11:25)    He reports an EF in the 20% range post anthracycline based chemotherapy.  He was recommended to see Dr. Lyn in our office and has an appt next week.  He had been on metoprolol in the past, though recently changed it to atenolol because of a borderline BP. His girlfriend reports that his metoprolol was often held at rehab because of hypotension.  He also has a history of Factor V Leiden and DVT, and has been on Xarelo, though is unaware of the dose.  He had an echocardiogram in our office 7/2019 with mild MR, eccentric LVH with moderate LV dysfunction (EF 35-40%), and mild aortic root dilatation.  He reports a cath about 5 years ago at [facility] that was unremarkable and a stress test more recently.      PAST MEDICAL & SURGICAL HISTORY:  dvt on xarelto  fast heart rates    SOCIAL HISTORY:  + former smoker, no alcohol or drug use    FAMILY HISTORY:    No family history of acute MI or sudden cardiac death.    MEDICATIONS  (STANDING):    MEDICATIONS  (PRN):      Allergies    No Known Allergies    Intolerances        REVIEW OF SYSTEMS:    CONSTITUTIONAL: No weakness, + fevers, no chills  EYES/ENT: No visual changes;  No vertigo or throat pain   NECK: No pain or stiffness  RESPIRATORY: No cough, wheezing, hemoptysis; No shortness of breath  CARDIOVASCULAR: No chest pain or palpitations  GASTROINTESTINAL: No abdominal pain. No nausea, vomiting, or hematemesis; No diarrhea or constipation. No melena or hematochezia.  GENITOURINARY: No dysuria, frequency or hematuria  NEUROLOGICAL: No numbness or weakness  SKIN: No itching or rash  All other review of systems is negative unless indicated above    VITAL SIGNS:   Vital Signs Last 24 Hrs  T(C): 35.3 (11 Jul 2019 10:47), Max: 35.3 (11 Jul 2019 10:47)  T(F): 95.5 (11 Jul 2019 10:47), Max: 95.5 (11 Jul 2019 10:47)  HR: 80 (11 Jul 2019 10:47) (80 - 80)  BP: 116/76 (11 Jul 2019 10:47) (116/76 - 116/76)  BP(mean): --  RR: 18 (11 Jul 2019 10:47) (18 - 18)  SpO2: --    I&O's Summary      On Exam:    Constitutional: NAD, alert and oriented x 3  Lungs:  Non-labored, breath sounds are clear bilaterally, No wheezing, rales or rhonchi  Cardiovascular: RR with occasional premature beats.  S1 and S2 positive.  No murmurs, rubs, gallops or clicks  Gastrointestinal: Bowel Sounds present, soft, nontender.   Lymph: No peripheral edema; toe wrapped. No cervical lymphadenopathy.  Neurological: Alert, no focal deficits  Skin: No rashes or ulcers   Psych:  Mood & affect appropriate.    LABS: All Labs Reviewed:                Blood Culture:         RADIOLOGY:    EKG: sr, pvc, rbbb, rvh, lad, non-specific t wave abn
HPI:  60yo M with AML diagnosed in 4/2019 at [facility] s/p induction chemotherapy now in morphologic remission admitted for consolidation chemotherapy with cycle 1 HIDAC.    Patient was initially followed by [facility] for factor V Leiden on Xarelto. Labs were normal at check up. Had increased SOB and had repeat labs done and was told to go to ER. Patient went to [facility] hospital due to insurance reasons. He was diagnosed with AML and underwent treated with induction chemotherapy 7+3. Day 14 BMbx showed PD and patient was reinduced with 5+2 at which time he developed a wound on the right toe with concern for osteomyelitis as well as right cheek fungal infection with aspergillosis and neutropenic fevers of 105. 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 After discharge from rehab due to insurance reasons, the patient was seen at [facility] by [place]. Coelho. BMbx showed a CR and plan was made for consolidation chemo with HIDAC. Patient will need an Allo transplant and has and appointment with Dr. Gaytan in August. The patient is a poor historian but was able to state he had heart failure after the chemo and his EF dropped to 20%.       PAST MEDICAL & SURGICAL HISTORY:  H/O cardiomyopathy  Factor 5 Leiden mutation, heterozygous  AML      Antimicrobials  voriconazole 200 milliGRAM(s) Oral every 12 hours      Immunological      Other  metoprolol succinate ER 12.5 milliGRAM(s) Oral daily  rivaroxaban 20 milliGRAM(s) Oral daily  sertraline 25 milliGRAM(s) Oral daily  sodium chloride 0.9%. 1000 milliLiter(s) IV Continuous <Continuous>      Allergies    No Known Allergies    Intolerances      SOCIAL HISTORY: current tobacco use    FAMILY HISTORY: noncontrib      ROS:    EYES:  Negative  blurry vision or double vision  GASTROINTESTINAL:  Negative for nausea, vomiting, diarrhea  -otherwise negative except for subjective    Vital Signs Last 24 Hrs  T(C): 36 (11 Jul 2019 13:12), Max: 36 (11 Jul 2019 13:12)  T(F): 96.8 (11 Jul 2019 13:12), Max: 96.8 (11 Jul 2019 13:12)  HR: 74 (11 Jul 2019 13:12) (74 - 80)  BP: 104/73 (11 Jul 2019 13:12) (104/73 - 116/76)  BP(mean): --  RR: 18 (11 Jul 2019 13:12) (18 - 18)  SpO2: 99% (11 Jul 2019 13:12) (99% - 99%)    PE:  WDWN in no distress  HEENT:  NC, PERRL, sclerae anicteric, conjunctivae clear, EOMI.  Sinuses nontender, no nasal exudate.  No buccal or pharyngeal lesions, erythema or exudate  Neck:  Supple, no adenopathy  Lungs:  No accessory muscle use, bilaterally clear to auscultation  Cor:  RRR, S1, S2, no murmur appreciated  Abd:  Symmetric, normoactive BS.  Soft, nontender, no masses, guarding or rebound.  Liver and spleen not enlarged  Extrem/Skin: no clubbing, no cyanosis, right great toe with nail lifting off, swollen warm, red  Neuro: grossly intact  Musc: moving all limbs freely, no focal deficits    LABS:                              MICROBIOLOGY:      RADIOLOGY & ADDITIONAL STUDIES:    --
Podiatry pager #: [facility] [phone]/ [facility] [id number]    Patient is a 59y old  Male who presents with a chief complaint of Cycle 1 HIDAC (11 Jul 2019 13:19)      HPI:  58yo M with AML diagnosed in 4/2019 s/p induction chemotherapy   now in morphologic remission admitted for consolidation chemotherapy   with cycle 1 HIDAC. Patient injured his right hallux month ago while he was in [facility]. He had prior IV abx to treat that hallux toe infection but failed.     Patient was initially followed by [facility] for factor V Leiden on Xarelto.   Labs were normal at check up. Had increased SOB and had repeat labs   done and was told to go to ER. Patient went to [facility] hospital due to insurance   reasons. He was diagnosed with AML and underwent treated with induction   chemotherapy7+3. Day 14 BMbx showed PD and patient was reinduced   with 5+2 at which time he developed a wound on the right tow with concern   for osteomyelitis as well asright cheek fungal  infection with aspergillosis and neutropenic fevers of 105.   After hospitalization, patient wasdischarged to Subacute rehab for 3   weeks. After discharge from rehab due to insurance reasons, the   patient was seen at [facility] by Dr. Coelho. BMbx showed a CR and plan   was made for consolidation chemo with HIDAC. Patient will need an   Allo transplant and has and appointment with Dr. Gaytan in August.   The patient is a poor historian but was able to state he had heart failure   after the chemo and his EF dropped to 20%. Patient also has a history   of sleep apnea (refusing CPAP), prior use of etoh, blood   clots (family history of Factor V Leiden).    effects was changed to xarelto (11 Jul 2019 11:25)      PAST MEDICAL & SURGICAL HISTORY:  H/O cardiomyopathy  Factor 5 Leiden mutation, heterozygous      MEDICATIONS  (STANDING):  cefTRIAXone   IVPB 1000 milliGRAM(s) IV Intermittent every 24 hours  metoprolol succinate ER 12.5 milliGRAM(s) Oral daily  rivaroxaban 20 milliGRAM(s) Oral daily  sertraline 25 milliGRAM(s) Oral daily  sodium chloride 0.9%. 1000 milliLiter(s) (100 mL/Hr) IV Continuous <Continuous>  voriconazole 200 milliGRAM(s) Oral every 12 hours    MEDICATIONS  (PRN):      Allergies    No Known Allergies    Intolerances        VITALS:    Vital Signs Last 24 Hrs  T(C): 36 (11 Jul 2019 13:12), Max: 36 (11 Jul 2019 13:12)  T(F): 96.8 (11 Jul 2019 13:12), Max: 96.8 (11 Jul 2019 13:12)  HR: 74 (11 Jul 2019 13:12) (74 - 80)  BP: 104/73 (11 Jul 2019 13:12) (104/73 - 116/76)  BP(mean): --  RR: 18 (11 Jul 2019 13:12) (18 - 18)  SpO2: 99% (11 Jul 2019 13:12) (99% - 99%)    LABS:                          8.9    6.2   )-----------( 82       ( 11 Jul 2019 14:39 )             25.6       07-11    139  |  103  |  26<H>  ----------------------------<  122<H>  4.6   |  24  |  1.45<H>    Ca    9.1      11 Jul 2019 14:39  Phos  3.9     07-11  Mg     1.8     07-11    TPro  6.0  /  Alb  3.8  /  TBili  0.3  /  DBili  x   /  AST  18  /  ALT  17  /  AlkPhos  85  07-11      CAPILLARY BLOOD GLUCOSE          PT/INR - ( 11 Jul 2019 14:39 )   PT: 12.3 sec;   INR: 1.08 ratio         PTT - ( 11 Jul 2019 14:39 )  PTT:27.4 sec    LOWER EXTREMITY PHYSICAL EXAM:    Vasular: DP/PT 2/4, B/L, CFT <5 seconds B/L, Temperature gradient normal, B/L.   Neuro: Epicritic sensation intact to the level of digits, B/L.  Musculoskeletal/Ortho: no gross deformity bl  Skin: right hallux nail is detached from the nail bed. The nail was easily removed, then reviewed a medial nail bed wound to the bone. Dactylitis and localized erythema noted to the hallux. No pus, no malodor.   etiology: patient injured the nail months ago     RADIOLOGY & ADDITIONAL STUDIES:

## 2019-07-12 ENCOUNTER — TRANSCRIPTION ENCOUNTER (OUTPATIENT)
Age: 59
End: 2019-07-12

## 2019-07-12 LAB
ALBUMIN SERPL ELPH-MCNC: 3.2 G/DL — LOW (ref 3.3–5)
ALP SERPL-CCNC: 81 U/L — SIGNIFICANT CHANGE UP (ref 40–120)
ALT FLD-CCNC: 15 U/L — SIGNIFICANT CHANGE UP (ref 10–45)
ANION GAP SERPL CALC-SCNC: 10 MMOL/L — SIGNIFICANT CHANGE UP (ref 5–17)
AST SERPL-CCNC: 15 U/L — SIGNIFICANT CHANGE UP (ref 10–40)
BASOPHILS # BLD AUTO: 0 K/UL — SIGNIFICANT CHANGE UP (ref 0–0.2)
BASOPHILS NFR BLD AUTO: 0.2 % — SIGNIFICANT CHANGE UP (ref 0–2)
BILIRUB SERPL-MCNC: 0.3 MG/DL — SIGNIFICANT CHANGE UP (ref 0.2–1.2)
BLD GP AB SCN SERPL QL: NEGATIVE — SIGNIFICANT CHANGE UP
BUN SERPL-MCNC: 19 MG/DL — SIGNIFICANT CHANGE UP (ref 7–23)
CALCIUM SERPL-MCNC: 8.9 MG/DL — SIGNIFICANT CHANGE UP (ref 8.4–10.5)
CHLORIDE SERPL-SCNC: 104 MMOL/L — SIGNIFICANT CHANGE UP (ref 96–108)
CO2 SERPL-SCNC: 25 MMOL/L — SIGNIFICANT CHANGE UP (ref 22–31)
CREAT SERPL-MCNC: 1.45 MG/DL — HIGH (ref 0.5–1.3)
CRP SERPL-MCNC: 0.96 MG/DL — HIGH (ref 0–0.4)
EOSINOPHIL # BLD AUTO: 0.1 K/UL — SIGNIFICANT CHANGE UP (ref 0–0.5)
EOSINOPHIL NFR BLD AUTO: 2 % — SIGNIFICANT CHANGE UP (ref 0–6)
ERYTHROCYTE [SEDIMENTATION RATE] IN BLOOD: 33 MM/HR — HIGH (ref 0–20)
GLUCOSE SERPL-MCNC: 104 MG/DL — HIGH (ref 70–99)
HCT VFR BLD CALC: 25.2 % — LOW (ref 39–50)
HCV AB S/CO SERPL IA: 0.08 S/CO — SIGNIFICANT CHANGE UP (ref 0–0.99)
HCV AB SERPL-IMP: SIGNIFICANT CHANGE UP
HGB BLD-MCNC: 8.8 G/DL — LOW (ref 13–17)
LYMPHOCYTES # BLD AUTO: 2.5 K/UL — SIGNIFICANT CHANGE UP (ref 1–3.3)
LYMPHOCYTES # BLD AUTO: 42.1 % — SIGNIFICANT CHANGE UP (ref 13–44)
MAGNESIUM SERPL-MCNC: 1.8 MG/DL — SIGNIFICANT CHANGE UP (ref 1.6–2.6)
MCHC RBC-ENTMCNC: 33.6 PG — SIGNIFICANT CHANGE UP (ref 27–34)
MCHC RBC-ENTMCNC: 35.1 GM/DL — SIGNIFICANT CHANGE UP (ref 32–36)
MCV RBC AUTO: 95.7 FL — SIGNIFICANT CHANGE UP (ref 80–100)
MONOCYTES # BLD AUTO: 0.5 K/UL — SIGNIFICANT CHANGE UP (ref 0–0.9)
MONOCYTES NFR BLD AUTO: 8.5 % — SIGNIFICANT CHANGE UP (ref 2–14)
NEUTROPHILS # BLD AUTO: 2.8 K/UL — SIGNIFICANT CHANGE UP (ref 1.8–7.4)
NEUTROPHILS NFR BLD AUTO: 47.3 % — SIGNIFICANT CHANGE UP (ref 43–77)
PHOSPHATE SERPL-MCNC: 3.9 MG/DL — SIGNIFICANT CHANGE UP (ref 2.5–4.5)
PLATELET # BLD AUTO: 92 K/UL — LOW (ref 150–400)
POTASSIUM SERPL-MCNC: 4.2 MMOL/L — SIGNIFICANT CHANGE UP (ref 3.5–5.3)
POTASSIUM SERPL-SCNC: 4.2 MMOL/L — SIGNIFICANT CHANGE UP (ref 3.5–5.3)
PROT SERPL-MCNC: 5.7 G/DL — LOW (ref 6–8.3)
RBC # BLD: 2.63 M/UL — LOW (ref 4.2–5.8)
RBC # FLD: 16.8 % — HIGH (ref 10.3–14.5)
RH IG SCN BLD-IMP: POSITIVE — SIGNIFICANT CHANGE UP
SODIUM SERPL-SCNC: 139 MMOL/L — SIGNIFICANT CHANGE UP (ref 135–145)
WBC # BLD: 5.9 K/UL — SIGNIFICANT CHANGE UP (ref 3.8–10.5)
WBC # FLD AUTO: 5.9 K/UL — SIGNIFICANT CHANGE UP (ref 3.8–10.5)

## 2019-07-12 PROCEDURE — 99233 SBSQ HOSP IP/OBS HIGH 50: CPT | Mod: GC

## 2019-07-12 PROCEDURE — 73630 X-RAY EXAM OF FOOT: CPT | Mod: 26,RT

## 2019-07-12 PROCEDURE — 70220 X-RAY EXAM OF SINUSES: CPT | Mod: 26

## 2019-07-12 PROCEDURE — 99232 SBSQ HOSP IP/OBS MODERATE 35: CPT

## 2019-07-12 PROCEDURE — 78472 GATED HEART PLANAR SINGLE: CPT | Mod: 26

## 2019-07-12 RX ADMIN — Medication 1 PATCH: at 20:09

## 2019-07-12 RX ADMIN — VORICONAZOLE 200 MILLIGRAM(S): 10 INJECTION, POWDER, LYOPHILIZED, FOR SOLUTION INTRAVENOUS at 18:06

## 2019-07-12 RX ADMIN — RIVAROXABAN 20 MILLIGRAM(S): KIT at 11:54

## 2019-07-12 RX ADMIN — CEFTRIAXONE 100 MILLIGRAM(S): 500 INJECTION, POWDER, FOR SOLUTION INTRAMUSCULAR; INTRAVENOUS at 17:01

## 2019-07-12 RX ADMIN — Medication 12.5 MILLIGRAM(S): at 05:47

## 2019-07-12 RX ADMIN — SERTRALINE 25 MILLIGRAM(S): 25 TABLET, FILM COATED ORAL at 11:54

## 2019-07-12 RX ADMIN — Medication 1 PATCH: at 21:23

## 2019-07-12 RX ADMIN — SODIUM CHLORIDE 100 MILLILITER(S): 9 INJECTION INTRAMUSCULAR; INTRAVENOUS; SUBCUTANEOUS at 20:10

## 2019-07-12 RX ADMIN — SODIUM CHLORIDE 100 MILLILITER(S): 9 INJECTION INTRAMUSCULAR; INTRAVENOUS; SUBCUTANEOUS at 05:47

## 2019-07-12 RX ADMIN — VORICONAZOLE 200 MILLIGRAM(S): 10 INJECTION, POWDER, LYOPHILIZED, FOR SOLUTION INTRAVENOUS at 05:47

## 2019-07-12 RX ADMIN — Medication 1 PATCH: at 00:52

## 2019-07-12 RX ADMIN — SODIUM CHLORIDE 100 MILLILITER(S): 9 INJECTION INTRAMUSCULAR; INTRAVENOUS; SUBCUTANEOUS at 00:53

## 2019-07-12 NOTE — PROGRESS NOTE ADULT - SUBJECTIVE AND OBJECTIVE BOX
Podiatry pager #: [facility] [phone]/ [facility] [phone]    Patient is a 59y old  Male who presents with a chief complaint of Cycle 1 HIDAC (12 Jul 2019 12:41)       INTERVAL HPI/OVERNIGHT EVENTS:  Patient seen and evaluated at bedside.  Pt is resting comfortable in NAD. Denies N/V/F/C.      Allergies    No Known Allergies    Intolerances        Vital Signs Last 24 Hrs  T(C): 36.4 (12 Jul 2019 13:40), Max: 37 (11 Jul 2019 17:36)  T(F): 97.6 (12 Jul 2019 13:40), Max: 98.6 (11 Jul 2019 17:36)  HR: 89 (12 Jul 2019 13:40) (68 - 91)  BP: 118/71 (12 Jul 2019 13:40) (96/59 - 122/70)  BP(mean): --  RR: 18 (12 Jul 2019 13:40) (18 - 18)  SpO2: 98% (12 Jul 2019 13:40) (96% - 99%)    LABS:                        8.8    5.9   )-----------( 92       ( 12 Jul 2019 07:06 )             25.2     07-12    139  |  104  |  19  ----------------------------<  104<H>  4.2   |  25  |  1.45<H>    Ca    8.9      12 Jul 2019 07:05  Phos  3.9     07-12  Mg     1.8     07-12    TPro  5.7<L>  /  Alb  3.2<L>  /  TBili  0.3  /  DBili  x   /  AST  15  /  ALT  15  /  AlkPhos  81  07-12    PT/INR - ( 11 Jul 2019 14:39 )   PT: 12.3 sec;   INR: 1.08 ratio         PTT - ( 11 Jul 2019 14:39 )  PTT:27.4 sec    CAPILLARY BLOOD GLUCOSE          Lower Extremity Physical Exam:  Vasular: DP/PT 2/4, B/L, CFT <5 seconds B/L, Temperature gradient normal, B/L.   Neuro: Epicritic sensation intact to the level of digits, B/L.  Musculoskeletal/Ortho: no gross deformity bl  Skin: right hallux nail bed wound to bone, no active drainage, no pus, no malodor. There is slight erythema and mild dactylitis to the right hallux.   etiology: patient injured the nail months ago     RADIOLOGY & ADDITIONAL TESTS:  < from: MR Foot No Cont, Right (07.11.19 @ 20:20) >    EXAM:  MR FOOT RT                            PROCEDURE DATE:  07/11/2019            INTERPRETATION:    RIGHT FOOT MRI    CLINICAL INFORMATION: Right first toe wound to bone, suspected   osteomyelitis.  TECHNIQUE: Multiplanar, multisequence MRI was obtained of the right foot.   Images were obtained through the forefoot.  COMPARISON: None available    FINDINGS:    There is high T2 and low T1 signal of the distal phalanx of the great   toe. The high T2 signal involves nearly the entirety of the digit,   however the low T1 signal does not extend to involve the entirety of the   distal phalanx, the base is spared of low T1 signal. The reported   overlying skin skin wound is not well appreciated. There may be an   associated fracture at the tuft of the distal phalanx There is no   associated joint effusion of the interphalangeal joint. There are no   other areas of marrow signal alteration which are suspicious for   osteomyelitis.    The flexor and extensor tendons appear intact. The Lisfrancligament   appears intact.    There is dorsal subcutaneous soft tissue edema. There is muscular edema   and atrophy, consistent with denervation. There is no fluid collection   identified.      IMPRESSION:    Signal alteration of the distal phalanx of the great toe. Given reported   physical exam findings of overlying wound extending to bone, this is   suspicious osteomyelitis. There may be superimposed fracture of the tuft   of the distal phalanx. X-ray correlation is recommended.                LILIAM HARDING MD,RADIOLOGY FELLOW  This document has been electronically signed.  JUSTIN MULLINS M.D., ATTENDING RADIOLOGIST  This document has been electronically signed. Jul 12 2019  9:49AM    < end of copied text >

## 2019-07-12 NOTE — PHYSICAL THERAPY INITIAL EVALUATION ADULT - PERTINENT HX OF CURRENT PROBLEM, REHAB EVAL
60yo M with AML diagnosed in 4/2019 s/p induction chemotherapy now in morphologic remission admitted for consolidation chemotherapy  with cycle 1 HIDAC.

## 2019-07-12 NOTE — PHYSICAL THERAPY INITIAL EVALUATION ADULT - CRITERIA FOR SKILLED THERAPEUTIC INTERVENTIONS
anticipated discharge recommendation/anticipated equipment needs at discharge/impairments found/functional limitations in following categories/therapy frequency/risk reduction/prevention/rehab potential/predicted duration of therapy intervention

## 2019-07-12 NOTE — PROGRESS NOTE ADULT - ASSESSMENT
60 yo M presents with right hallux wound to bone  - vitals stable, no WBC, ESR 33  - MRI: showing OM to the right hallux distal phalanx   - wound probe to bone with fibrotic tissue, no active drainage, no pus, no malodor. Mild cellulitis and dactylitis to the hallux   - pending wound culture   - ID is considering sending the patient home with PICC to manage the OM  - patient has OM to the right hallux, however, it is not actively draining, no pus, no malodor. No emergent podiatry surgery at this time. Podiatry will be on board for possible surgical resection of the OM pending oncology and infectious disease doctors recommendations  - continue IV antibiotics per ID   - see with attending

## 2019-07-12 NOTE — DISCHARGE NOTE PROVIDER - NSDCCPCAREPLAN_GEN_ALL_CORE_FT
PRINCIPAL DISCHARGE DIAGNOSIS  Diagnosis: AML (acute myeloid leukemia)  Assessment and Plan of Treatment: Follow up with Dr. Coelho, if fever greater than or equal to 100.4 call MD or go to the ER      SECONDARY DISCHARGE DIAGNOSES  Diagnosis: Osteomyelitis of toe of right foot  Assessment and Plan of Treatment: continue antibiotic, follow up with podiatry and infectious disease

## 2019-07-12 NOTE — PROGRESS NOTE ADULT - PROBLEM SELECTOR PLAN 4
Patient is not neutropenic  If febrile culture q48 hours Followed by Cardiology  MUGA 28%  Continue toprol xl 12.5mg daily, titrate per cardiologist  Pt was on Lisinopril 5mg in  April, ?resume

## 2019-07-12 NOTE — PROGRESS NOTE ADULT - PROBLEM SELECTOR PLAN 3
Followed by Cardiology  MUGA  start toprol xl 12.5mg daily Patient with h/o DVT/PE and  factor V leiden  hold Xarelto for PICC on 7/14, resume after PICC  DC Xarelto when PLT <50 K

## 2019-07-12 NOTE — PROGRESS NOTE ADULT - SUBJECTIVE AND OBJECTIVE BOX
infectious diseases progress note:    DI CONTRERAS is a 59y y. o. Male patient    Patient reports: no new issues    ROS:    EYES:  Negative  blurry vision or double vision  GASTROINTESTINAL:  Negative for nausea, vomiting, diarrhea  -otherwise negative except for subjective    Allergies    No Known Allergies    Intolerances        ANTIBIOTICS/RELEVANT:  antimicrobials  cefTRIAXone   IVPB 1000 milliGRAM(s) IV Intermittent every 24 hours  voriconazole 200 milliGRAM(s) Oral every 12 hours    immunologic:    OTHER:  metoprolol succinate ER 12.5 milliGRAM(s) Oral daily  nicotine - 21 mG/24Hr(s) Patch 1 patch Transdermal daily  rivaroxaban 20 milliGRAM(s) Oral daily  sertraline 25 milliGRAM(s) Oral daily  sodium chloride 0.9%. 1000 milliLiter(s) IV Continuous <Continuous>      Objective:  Vital Signs Last 24 Hrs  T(C): 36.1 (12 Jul 2019 10:10), Max: 37 (11 Jul 2019 17:36)  T(F): 96.9 (12 Jul 2019 10:10), Max: 98.6 (11 Jul 2019 17:36)  HR: 68 (12 Jul 2019 10:10) (68 - 91)  BP: 108/71 (12 Jul 2019 10:10) (96/59 - 122/70)  BP(mean): --  RR: 18 (12 Jul 2019 10:10) (18 - 18)  SpO2: 96% (12 Jul 2019 10:10) (96% - 99%)    T(C): 36.1 (07-12-19 @ 10:10), Max: 37 (07-11-19 @ 17:36)  T(C): 36.1 (07-12-19 @ 10:10), Max: 37 (07-11-19 @ 17:36)  T(C): 36.1 (07-12-19 @ 10:10), Max: 37 (07-11-19 @ 17:36)    PHYSICAL EXAM:  Constitutional: Well-developed, well nourished  Eyes: PERRLA, EOMI  Ear/Nose/Throat: oropharynx normal	  Neck: no JVD, no lymphadenopathy, supple  Respiratory: no accessory muscle use  Cardiovascular: RRR,   Gastrointestinal: soft, NT  Extremities: no clubbing, no cyanosis, edema absent, toe is wrapped      LABS:                        8.8    5.9   )-----------( 92       ( 12 Jul 2019 07:06 )             25.2       5.9 07-12 @ 07:06  6.2 07-11 @ 14:39      07-12    139  |  104  |  19  ----------------------------<  104<H>  4.2   |  25  |  1.45<H>    Ca    8.9      12 Jul 2019 07:05  Phos  3.9     07-12  Mg     1.8     07-12    TPro  5.7<L>  /  Alb  3.2<L>  /  TBili  0.3  /  DBili  x   /  AST  15  /  ALT  15  /  AlkPhos  81  07-12      Creatinine, Serum: 1.45 mg/dL (07-12-19 @ 07:05)  Creatinine, Serum: 1.45 mg/dL (07-11-19 @ 14:39)      PT/INR - ( 11 Jul 2019 14:39 )   PT: 12.3 sec;   INR: 1.08 ratio         PTT - ( 11 Jul 2019 14:39 )  PTT:27.4 sec          MICROBIOLOGY:              RADIOLOGY & ADDITIONAL STUDIES:    < from: MR Foot No Cont, Right (07.11.19 @ 20:20) >    EXAM:  MR FOOT RT                            PROCEDURE DATE:  07/11/2019            INTERPRETATION:    RIGHT FOOT MRI    CLINICAL INFORMATION: Right first toe wound to bone, suspected   osteomyelitis.  TECHNIQUE: Multiplanar, multisequence MRI was obtained of the right foot.   Images were obtained through the forefoot.  COMPARISON: None available    FINDINGS:    There is high T2 and low T1 signal of the distal phalanx of the great   toe. The high T2 signal involves nearly the entirety of the digit,   however the low T1 signal does not extend to involve the entirety of the   distal phalanx, the base is spared of low T1 signal. The reported   overlying skin skin wound is not well appreciated. There may be an   associated fracture at the tuft of the distal phalanx There is no   associated joint effusion of the interphalangeal joint. There are no   other areas of marrow signal alteration which are suspicious for   osteomyelitis.    The flexor and extensor tendons appear intact. The Lisfrancligament   appears intact.    There is dorsal subcutaneous soft tissue edema. There is muscular edema   and atrophy, consistent with denervation. There is no fluid collection   identified.      IMPRESSION:    Signal alteration of the distal phalanx of the great toe. Given reported   physical exam findings of overlying wound extending to bone, this is   suspicious osteomyelitis. There may be superimposed fracture of the tuft   of the distal phalanx. X-ray correlation is recommended.

## 2019-07-12 NOTE — PROGRESS NOTE ADULT - PROBLEM SELECTOR PLAN 5
Patient on Xarelto for PPX due to Factor 5.   Hold when plt <50. Patient with h/o DVT/PE and  factor V leiden  hold Xarelto for PICC on 7/14, resume after PICC  DC Xarelto when PLT <50 K

## 2019-07-12 NOTE — PROGRESS NOTE ADULT - PROBLEM SELECTOR PLAN 1
podiatry to weigh in on possible bone biopsy to direct micro but will defer to them regarding this decision considering that infection involves the toe.  Superficial culture micro pending. Unless we have evidence for MRSA or pseudomonas anticipate ceftriaxone 2 grams IV q-day for 6-8 weeks with duration based on clinical response and levels of inflammatory markers. Possible PICC on Monday with dc on IV meds

## 2019-07-12 NOTE — PHYSICAL THERAPY INITIAL EVALUATION ADULT - DISCHARGE DISPOSITION, PT EVAL
home apta for stength, gait and balance training , girlfriend assist when home/home w/ home PT/home w/ assist

## 2019-07-12 NOTE — DISCHARGE NOTE PROVIDER - CARE PROVIDER_API CALL
Debbi Coelho (DO)  Hematology; Medical Oncology  [address] [place] [address]  Phone: [phone]  Fax: [phone]  Follow Up Time:

## 2019-07-12 NOTE — PROGRESS NOTE ADULT - SUBJECTIVE AND OBJECTIVE BOX
Diagnosis    Protocol/Chemo Regimen:  Day:      Pt endorsed:    Review of Systems: Patient denied nausea, vomiting, odynophagia, chest pain, cough, dyspnea, abdominal pain, constipation, diarrhea, rash, fatigue, headache      Pain scale:                                        Location:    Diet:     Allergies: No Known Allergies      ANTIMICROBIALS  cefTRIAXone   IVPB 1000 milliGRAM(s) IV Intermittent every 24 hours  voriconazole 200 milliGRAM(s) Oral every 12 hours      HEME/ONC MEDICATIONS  rivaroxaban 20 milliGRAM(s) Oral daily      STANDING MEDICATIONS  metoprolol succinate ER 12.5 milliGRAM(s) Oral daily  nicotine - 21 mG/24Hr(s) Patch 1 patch Transdermal daily  sertraline 25 milliGRAM(s) Oral daily  sodium chloride 0.9%. 1000 milliLiter(s) IV Continuous <Continuous>      Vital Signs Last 24 Hrs  T(C): 36.4 (12 Jul 2019 05:24), Max: 37 (11 Jul 2019 17:36)  T(F): 97.6 (12 Jul 2019 05:24), Max: 98.6 (11 Jul 2019 17:36)  HR: 88 (12 Jul 2019 05:24) (74 - 91)  BP: 122/70 (12 Jul 2019 05:24) (96/59 - 122/70)  BP(mean): --  RR: 18 (12 Jul 2019 05:24) (18 - 18)  SpO2: 97% (12 Jul 2019 05:24) (96% - 99%)      PHYSICAL EXAM  General: adult in NAD  HEENT: clear oropharynx, anicteric sclera  Neck: supple  CV: normal S1/S2 RRR  Lungs: positive air movement b/l ant lungs,clear to auscultation, no wheezes, no rales  Abdomen: soft, + BS,  non-tender non-distended, no hepatosplenomegaly  Ext: no edema  Skin: no rash  Neuro: alert and oriented X 3, no focal deficits  Central Line: normal    LABS:                           8.9    6.2   )-----------( 82       ( 11 Jul 2019 14:39 )             25.6         Mean Cell Volume : 97.3 fl  Mean Cell Hemoglobin : 33.8 pg  Mean Cell Hemoglobin Concentration : 34.8 gm/dL  Auto Neutrophil # : 3.3 K/uL  Auto Lymphocyte # : 2.3 K/uL  Auto Monocyte # : 0.4 K/uL  Auto Eosinophil # : 0.1 K/uL  Auto Basophil # : 0.0 K/uL  Auto Neutrophil % : 53.5 %  Auto Lymphocyte % : 36.8 %  Auto Monocyte % : 7.2 %  Auto Eosinophil % : 2.4 %  Auto Basophil % : 0.2 %      07-11    139  |  103  |  26<H>  ----------------------------<  122<H>  4.6   |  24  |  1.45<H>    Ca    9.1      11 Jul 2019 14:39  Phos  3.9     07-11  Mg     1.8     07-11    TPro  6.0  /  Alb  3.8  /  TBili  0.3  /  DBili  x   /  AST  18  /  ALT  17  /  AlkPhos  85  07-11      Mg 1.8  Phos 3.9      PT/INR - ( 11 Jul 2019 14:39 )   PT: 12.3 sec;   INR: 1.08 ratio         PTT - ( 11 Jul 2019 14:39 )  PTT:27.4 sec      Uric Acid 6.4        RADIOLOGY & ADDITIONAL STUDIES: Detail Level: Zone Quality 226: Preventive Care And Screening: Tobacco Use: Screening And Cessation Intervention: Patient screened for tobacco and never smoked Diagnosis: AML     Protocol/Chemo Regimen: C1 HIDAC on hold     Day: NA     Pt endorsed: little pain on right great toe yesterday, not today    Review of Systems: Patient denied nausea, vomiting, odynophagia, chest pain, cough, dyspnea, abdominal pain, constipation, diarrhea, rash, fatigue, headache      Pain scale:   1-2/10     right great toe                                    Diet: Regular    Allergies: No Known Allergies      ANTIMICROBIALS  cefTRIAXone   IVPB 1000 milliGRAM(s) IV Intermittent every 24 hours  voriconazole 200 milliGRAM(s) Oral every 12 hours      HEME/ONC MEDICATIONS  rivaroxaban 20 milliGRAM(s) Oral daily      STANDING MEDICATIONS  metoprolol succinate ER 12.5 milliGRAM(s) Oral daily  nicotine - 21 mG/24Hr(s) Patch 1 patch Transdermal daily  sertraline 25 milliGRAM(s) Oral daily  sodium chloride 0.9%. 1000 milliLiter(s) IV Continuous <Continuous>      Vital Signs Last 24 Hrs  T(C): 36.4 (12 Jul 2019 05:24), Max: 37 (11 Jul 2019 17:36)  T(F): 97.6 (12 Jul 2019 05:24), Max: 98.6 (11 Jul 2019 17:36)  HR: 88 (12 Jul 2019 05:24) (74 - 91)  BP: 122/70 (12 Jul 2019 05:24) (96/59 - 122/70)  BP(mean): --  RR: 18 (12 Jul 2019 05:24) (18 - 18)  SpO2: 97% (12 Jul 2019 05:24) (96% - 99%)      PHYSICAL EXAM  General: adult in NAD  HEENT: clear oropharynx, anicteric sclera  Neck: supple  CV: normal S1/S2 RRR  Lungs: positive air movement b/l ant lungs,clear to auscultation, no wheezes, no rales  Abdomen: soft, + BS,  non-tender non-distended, no hepatosplenomegaly  Ext: no edema; right great toe with dressing D/C/I  Skin: no rash  Neuro: alert and oriented X 3, no focal deficits  Peripheral  Line: C/D/I      LABS:             8.8    5.9   )-----------( 92       ( 12 Jul 2019 07:06 )             25.2         Mean Cell Volume : 95.7 fl  Mean Cell Hemoglobin : 33.6 pg  Mean Cell Hemoglobin Concentration : 35.1 gm/dL  Auto Neutrophil # : 2.8 K/uL  Auto Lymphocyte # : 2.5 K/uL  Auto Monocyte # : 0.5 K/uL  Auto Eosinophil # : 0.1 K/uL  Auto Basophil # : 0.0 K/uL  Auto Neutrophil % : 47.3 %  Auto Lymphocyte % : 42.1 %  Auto Monocyte % : 8.5 %  Auto Eosinophil % : 2.0 %  Auto Basophil % : 0.2 %      07-12    139  |  104  |  19  ----------------------------<  104<H>  4.2   |  25  |  1.45<H>    Ca    8.9      12 Jul 2019 07:05  Phos  3.9     07-12  Mg     1.8     07-12    TPro  5.7<L>  /  Alb  3.2<L>  /  TBili  0.3  /  DBili  x   /  AST  15  /  ALT  15  /  AlkPhos  81  07-12      PT/INR - ( 11 Jul 2019 14:39 )   PT: 12.3 sec;   INR: 1.08 ratio         PTT - ( 11 Jul 2019 14:39 )  PTT:27.4 sec    C-Reactive Protein, Serum (07.12.19 @ 09:41)    C-Reactive Protein, Serum: 0.96 mg/dL    Sedimentation Rate, Erythrocyte (07.12.19 @ 09:56)    Sedimentation Rate, Erythrocyte: 33 mm/hr      RADIOLOGY & ADDITIONAL STUDIES:    < from: NM MUGA Scan (07.12.19 @ 08:52) >  IMPRESSION: Gated blood pool study demonstrates:  Dilated left ventricle with generalized hypokinesia and low resting left   ventricular ejection fraction of 28%.    < from: Xray Sinuses Paranasal (07.12.19 @ 09:27) >  The visualized paranasal sinuses appear well aerated, without air-fluid   levels. The patient is edentulous.      < from: MR Foot No Cont, Right (07.11.19 @ 20:20) >  Signal alteration of the distal phalanx of the great toe. Given reported   physical exam findings of overlying wound extending to bone, this is   suspicious osteomyelitis. There may be superimposed fracture of the tuft   of the distal phalanx. X-ray correlation is recommended. Quality 431: Preventive Care And Screening: Unhealthy Alcohol Use - Screening: Patient screened for unhealthy alcohol use using a single question and scores less than 2 times per year

## 2019-07-12 NOTE — DISCHARGE NOTE PROVIDER - NSDCFUADDAPPT_GEN_ALL_CORE_FT
You must follow up with Dr. Lyn from Cardiology [phone]. Call to make an appointment for one week from now    You must call and make an appointment for outpatient follow up with Infectious Disease. Please call the office for an appointment. Dr. Gordon Ford  You must follow up with Dr. Lyn from Cardiology [phone]. Call to make an appointment for one week from now    You must call and make an appointment for outpatient follow up with Infectious Disease. Please call the office for an appointment. Dr. Gordon Ford     You have an appointment with Dr. Coelho You must follow up with Dr. Lyn from Cardiology [phone]. Call to make an appointment for one week from now    You must call and make an appointment for outpatient follow up with Infectious Disease. Please call the office for an appointment. Dr. Gordon Ford     You have an appointment with Dr. Coelho    You have need to call [facility] to follow up with issues related to your enlarged prostate. [phone] You must follow up with Dr. Lyn from Cardiology [phone]. Call to make an appointment for one week from now    You must call and make an appointment for outpatient follow up with Infectious Disease. Please call the office for an appointment. Dr. Gordon Ford     You have an appointment with Dr. Coelho on Monday 7/22/19 at 9am     You have need to call [facility] to follow up with issues related to your enlarged prostate. [phone]

## 2019-07-12 NOTE — PROGRESS NOTE ADULT - PROBLEM SELECTOR PLAN 1
Patient with h/o DVT and PE on AC for prophylaxis given factor V.   Hold when plt <50 Admit for cycle 1 consolidation HIDAC which is on hold due to infectious issue  Monitor for cerebellar toxicity when chemo starts   pred forte eye drops with chemo  Monitor labs, replace blood and lytes PRN  Pain control, antiemetics, IV hydration

## 2019-07-12 NOTE — DISCHARGE NOTE PROVIDER - NSDCFUADDINST_GEN_ALL_CORE_FT
Podiatry Discharge Instructions:  Follow up: Please follow up with Dr. Estrada within 1 week of discharge from the hospital, please call [phone] for appointment and discuss that you recently were seen in the hospital.  Wound Care: Please apply Xeroform to the right hallux wound, then dress with 4x4 gauze, feliciano daily.  Weight bearing: Please weight bear as tolerated in a surgical shoe.  Antibiotics: Please continue as instructed.

## 2019-07-12 NOTE — PROGRESS NOTE ADULT - PROBLEM SELECTOR PLAN 2
continue voriconazole for now. As patient had prior infection he will remain at risk for recurrence with any periods of recurrent neutropenia and voriconazole has been studied in this context.  Continue for now but in the future once patient is in remission will need evaluation as lifelong therapy is not necessarily required

## 2019-07-12 NOTE — PROGRESS NOTE ADULT - ASSESSMENT
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

## 2019-07-12 NOTE — PHYSICAL THERAPY INITIAL EVALUATION ADULT - ADDITIONAL COMMENTS
patient lives with his girlfriend in [facility] and has few steps to go up to the second level for shower only. uses RW was about to start with PT at home

## 2019-07-12 NOTE — PROGRESS NOTE ADULT - PROBLEM SELECTOR PLAN 3
per oncology-this complicates treatment for osteo as amputation may be considered to allow for further chemo

## 2019-07-12 NOTE — PROGRESS NOTE ADULT - PROBLEM SELECTOR PLAN 2
Continue with Voriconazole 2/2 h/o fungal infection of face Patient is not neutropenic, afebrile   If febrile pan cultures and CXR   Continue Ceftriaxone, need to clarify with ID re dosage   FU cultures: blood, wound  Pediatrist following   PT wound care to see pt  Local wound care  X ray of right foot

## 2019-07-12 NOTE — PROGRESS NOTE ADULT - ASSESSMENT
Patient is a 59 year old male who presents for cycle 1 HIDAC. 58 yo M with h/o HTN, Factor V Leiden, PE/DVT, on Xarelto, ETOH use, recent quit 44 pack year smoker.  recent diagnosed AML, BM bx 4/15/2019 with AML, M1, 64 % myeloid blasts, CD34, 33, 117, 135+, Tdt -. 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 Moleculasr makers + ASXL1 & Run1, FLT -. s/p induction chemo with 5+3 4/17-4/23, day15 BM bx with 10-15 % cellular. s/p re induction with 5+2 on 5/4. BM bx 5/24 hypocellular marrow c/w treatment effect. 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 Staph epi bacteremia treatment, BAL on 5/24 +EBV, RVP + Metapneumo virus, echo EF 24 on 5/31, treated for right great toe osteomyelitis and pneumonia.   admitted on 7/11 for C1 HIDAC which is on hold for w/u of right osteomyelitis evidenced on mRI right great toe on 7/11. Pt has thrombocytopenia due to chemo and or disease.

## 2019-07-12 NOTE — PROGRESS NOTE ADULT - ASSESSMENT
Mr. Delong is a 59 year old male with AML previously on anthracycline based chemotheraphy, with a reported EF in the 20's at this time, who now presents for chemotherapy and evaluation of a toe infection.  More recently, he had an echocardiogram in our office 7/2019 with mild MR, eccentric LVH with moderate LV dysfunction (EF 35-40%), and mild aortic root dilatation.  He also has a history of Factor V Leiden and VTE, and has been on Xarelto.    - No sign of acute ischemia, nor does he have any chest pain. EKG is ordered and pending. His prior was abnormal with a RBBB, LAD, RVH and non-specific st abn. He reports a cath about 5 years ago at [facility] that was unremarkable and a stress test more recently, though results of this are unavailable.  - No sign of volume overload. He seems to be tolerating Toprol XL 12.5. Increase to 25 daily if BP can tolerate  - Mild NAGA on CMP, so hold off on ace-inhibitor, especially in the setting of borderline BP.  - MUGA is pending for today  - Continue Xarelto for history of VTE if platelets remain >50,000  - Watch creatinine and electrolytes. Keep K>4, Mg>2  - Will follow with you.

## 2019-07-12 NOTE — PROGRESS NOTE ADULT - ATTENDING COMMENTS
58 yo M with AML s/p induction and re-induction with 7+3/5+2 at [place] in CR1, hx factor V Leiden and DVT on anticoagulation, EtOH abuse, admitted on 7/11 for C1 HIDAC  However, R 1st toe with infection -MRI R toe done 7/11    -MRI R toe 7/11 showed signal alteration of the distal phalanx of the great toe. Given reported physical exam findings of overlying wound extending to bone, this is suspicious osteomyelitis. There may be superimposed fracture of the tuft of the distal phalanx. X-ray correlation is recommended. Will order X ray R foot today to r/o fx. ID on board -on IV Ceftriazone, will d/w ID given osteomyelelitis  -pt will need IV access for Abx and treatment  -afebrile, counts normal. Will d/w ID regarding starting chemo which will make pt pancytopenic for at least 2 wks; HIDAC on hold for now given new finding  -monitor counts 58 yo M with AML s/p induction and re-induction with 7+3/5+2 at [place] in CR1, hx factor V Leiden and DVT on anticoagulation, EtOH abuse, admitted on 7/11 for C1 HIDAC  However, R 1st toe with infection -MRI R toe done 7/11    -MRI R toe 7/11 showed signal alteration of the distal phalanx of the great toe. Given reported physical exam findings of overlying wound extending to bone, this is suspicious osteomyelitis. There may be superimposed fracture of the tuft of the distal phalanx. X-ray correlation is recommended. Will order X ray R foot today to r/o fx. ID on board -on IV Ceftriazone and po Vori, will d/w ID given osteomyelelitis. F/U BCx's from 7/11, wound Cx 7/11  -pt will need IV access for Abx and treatment  -afebrile, counts normal. Will d/w ID regarding starting chemo which will make pt pancytopenic for at least 2 wks; HIDAC on hold for now given new finding  -monitor counts

## 2019-07-12 NOTE — PROGRESS NOTE ADULT - SUBJECTIVE AND OBJECTIVE BOX
[facility] - Ede Zamudio, Riki, Edgar, Gem, Jose Willacasimiro  Office Number:  [phone]    Patient resting comfortably in bed in NAD.  Laying flat with no respiratory distress.  No complaints of chest pain, dyspnea, palpitations, PND, or orthopnea.  Feeling ok but very angry about his situation. He has threatened to go home if he does not get chemotherapy.    ROS: negative unless otherwise mentioned.    Telemetry:  off    MEDICATIONS  (STANDING):  cefTRIAXone   IVPB 1000 milliGRAM(s) IV Intermittent every 24 hours  metoprolol succinate ER 12.5 milliGRAM(s) Oral daily  nicotine - 21 mG/24Hr(s) Patch 1 patch Transdermal daily  rivaroxaban 20 milliGRAM(s) Oral daily  sertraline 25 milliGRAM(s) Oral daily  sodium chloride 0.9%. 1000 milliLiter(s) (100 mL/Hr) IV Continuous <Continuous>  voriconazole 200 milliGRAM(s) Oral every 12 hours    MEDICATIONS  (PRN):      Allergies    No Known Allergies    Intolerances        Vital Signs Last 24 Hrs  T(C): 36.4 (12 Jul 2019 05:24), Max: 37 (11 Jul 2019 17:36)  T(F): 97.6 (12 Jul 2019 05:24), Max: 98.6 (11 Jul 2019 17:36)  HR: 88 (12 Jul 2019 05:24) (74 - 91)  BP: 122/70 (12 Jul 2019 05:24) (96/59 - 122/70)  BP(mean): --  RR: 18 (12 Jul 2019 05:24) (18 - 18)  SpO2: 97% (12 Jul 2019 05:24) (96% - 99%)    I&O's Summary    11 Jul 2019 07:01  -  12 Jul 2019 07:00  --------------------------------------------------------  IN: 1147 mL / OUT: 900 mL / NET: 247 mL        ON EXAM:    Constitutional: NAD, alert and oriented x 3  Lungs:  Non-labored, breath sounds are clear bilaterally, No wheezing, rales or rhonchi  Cardiovascular: RR with occasional premature beats.  S1 and S2 positive.  No murmurs, rubs, gallops or clicks  Gastrointestinal: Bowel Sounds present, soft, nontender.   Lymph: No peripheral edema; toe wrapped. No cervical lymphadenopathy.  Neurological: Alert, no focal deficits  Skin: No rashes or ulcers   Psych:  Mood & affect appropriate.    LABS: All Labs Reviewed:                        8.8    5.9   )-----------( 92       ( 12 Jul 2019 07:06 )             25.2                         8.9    6.2   )-----------( 82       ( 11 Jul 2019 14:39 )             25.6     12 Jul 2019 07:05    139    |  104    |  19     ----------------------------<  104    4.2     |  25     |  1.45   11 Jul 2019 14:39    139    |  103    |  26     ----------------------------<  122    4.6     |  24     |  1.45     Ca    8.9        12 Jul 2019 07:05  Ca    9.1        11 Jul 2019 14:39  Phos  3.9       12 Jul 2019 07:05  Phos  3.9       11 Jul 2019 14:39  Mg     1.8       12 Jul 2019 07:05  Mg     1.8       11 Jul 2019 14:39    TPro  5.7    /  Alb  3.2    /  TBili  0.3    /  DBili  x      /  AST  15     /  ALT  15     /  AlkPhos  81     12 Jul 2019 07:05  TPro  6.0    /  Alb  3.8    /  TBili  0.3    /  DBili  x      /  AST  18     /  ALT  17     /  AlkPhos  85     11 Jul 2019 14:39    PT/INR - ( 11 Jul 2019 14:39 )   PT: 12.3 sec;   INR: 1.08 ratio         PTT - ( 11 Jul 2019 14:39 )  PTT:27.4 sec      Blood Culture:

## 2019-07-12 NOTE — DISCHARGE NOTE PROVIDER - HOSPITAL COURSE
Patient is a 59 year old male who was admitted for cycle 1 of HIDAC consolidation for AML. Chemo was held and patient was discharged home due to active infection on right great toe. Patient with osteomyelitis followed by ID with plan for 6 weeks of abx or plan to move forward with amputation. In either case Podiatry will see patient as outpatient and address the decision at that time. Patient was restarted on Xarelto following the placement of a PICC line and it is to be stopped should his platelets drop below 50. The patient was discharged home with the number to call to follow up outpatient with  (BPH, ID and Cardiology). He was seen by Cardiology while admitted because a recent ECHO and MUGA showed a decrease in EF which was first noted following induction/reinduction. Beta blocker was titrated up and patient was advised to follow up outpatient. Patient is a 59 year old male who was admitted for cycle 1 of HIDAC consolidation for AML following initial induction at [facility] on 5/2 with multiple complications.  Chemo was held and patient was discharged home due to active infection on right great toe.         The patient now with osteomyelitis seen on MRI 7/11 was followed by ID  inpatient with outpatient plan for 6 weeks of abx OR plan to move forward with amputation. In either case Podiatry who also followed the patient was admitted will see patient as outpatient and address the decision at that time.         Patient was restarted on Xarelto for Factor V leiden following the placement of a PICC line. AC should be stopped should his platelets drop below 50. The patient was discharged home with the number to call to follow up outpatient with  (BPH), ID and Cardiology. He was seen by Cardiology while admitted because a recent ECHO and MUGA showed a decrease in EF 28% which was first noted following induction/reinduction at [facility]. Beta blocker was titrated up and patient was advised to follow up outpatient. The patient will need to follow up with Dr. Coelho outpatient to determine long term plan as there is concern for relapse if no chemo is given in the setting of this active infection.         Patient is stable for discharge home.

## 2019-07-13 LAB
ALBUMIN SERPL ELPH-MCNC: 3.6 G/DL — SIGNIFICANT CHANGE UP (ref 3.3–5)
ALP SERPL-CCNC: 87 U/L — SIGNIFICANT CHANGE UP (ref 40–120)
ALT FLD-CCNC: 17 U/L — SIGNIFICANT CHANGE UP (ref 10–45)
ANION GAP SERPL CALC-SCNC: 12 MMOL/L — SIGNIFICANT CHANGE UP (ref 5–17)
AST SERPL-CCNC: 19 U/L — SIGNIFICANT CHANGE UP (ref 10–40)
BASOPHILS # BLD AUTO: 0 K/UL — SIGNIFICANT CHANGE UP (ref 0–0.2)
BASOPHILS NFR BLD AUTO: 0.1 % — SIGNIFICANT CHANGE UP (ref 0–2)
BILIRUB SERPL-MCNC: 0.3 MG/DL — SIGNIFICANT CHANGE UP (ref 0.2–1.2)
BUN SERPL-MCNC: 15 MG/DL — SIGNIFICANT CHANGE UP (ref 7–23)
CALCIUM SERPL-MCNC: 9.3 MG/DL — SIGNIFICANT CHANGE UP (ref 8.4–10.5)
CHLORIDE SERPL-SCNC: 106 MMOL/L — SIGNIFICANT CHANGE UP (ref 96–108)
CO2 SERPL-SCNC: 23 MMOL/L — SIGNIFICANT CHANGE UP (ref 22–31)
CREAT SERPL-MCNC: 1.5 MG/DL — HIGH (ref 0.5–1.3)
EOSINOPHIL # BLD AUTO: 0.1 K/UL — SIGNIFICANT CHANGE UP (ref 0–0.5)
EOSINOPHIL NFR BLD AUTO: 2.8 % — SIGNIFICANT CHANGE UP (ref 0–6)
GLUCOSE SERPL-MCNC: 102 MG/DL — HIGH (ref 70–99)
HCT VFR BLD CALC: 26.7 % — LOW (ref 39–50)
HGB BLD-MCNC: 9 G/DL — LOW (ref 13–17)
LYMPHOCYTES # BLD AUTO: 2.1 K/UL — SIGNIFICANT CHANGE UP (ref 1–3.3)
LYMPHOCYTES # BLD AUTO: 44 % — SIGNIFICANT CHANGE UP (ref 13–44)
MAGNESIUM SERPL-MCNC: 1.8 MG/DL — SIGNIFICANT CHANGE UP (ref 1.6–2.6)
MCHC RBC-ENTMCNC: 32.9 PG — SIGNIFICANT CHANGE UP (ref 27–34)
MCHC RBC-ENTMCNC: 33.8 GM/DL — SIGNIFICANT CHANGE UP (ref 32–36)
MCV RBC AUTO: 97.2 FL — SIGNIFICANT CHANGE UP (ref 80–100)
MONOCYTES # BLD AUTO: 0.5 K/UL — SIGNIFICANT CHANGE UP (ref 0–0.9)
MONOCYTES NFR BLD AUTO: 9.5 % — SIGNIFICANT CHANGE UP (ref 2–14)
NEUTROPHILS # BLD AUTO: 2.1 K/UL — SIGNIFICANT CHANGE UP (ref 1.8–7.4)
NEUTROPHILS NFR BLD AUTO: 43.6 % — SIGNIFICANT CHANGE UP (ref 43–77)
PHOSPHATE SERPL-MCNC: 4.3 MG/DL — SIGNIFICANT CHANGE UP (ref 2.5–4.5)
PLATELET # BLD AUTO: 88 K/UL — LOW (ref 150–400)
POTASSIUM SERPL-MCNC: 4.6 MMOL/L — SIGNIFICANT CHANGE UP (ref 3.5–5.3)
POTASSIUM SERPL-SCNC: 4.6 MMOL/L — SIGNIFICANT CHANGE UP (ref 3.5–5.3)
PROT SERPL-MCNC: 6 G/DL — SIGNIFICANT CHANGE UP (ref 6–8.3)
RBC # BLD: 2.75 M/UL — LOW (ref 4.2–5.8)
RBC # FLD: 16.8 % — HIGH (ref 10.3–14.5)
SODIUM SERPL-SCNC: 141 MMOL/L — SIGNIFICANT CHANGE UP (ref 135–145)
WBC # BLD: 4.8 K/UL — SIGNIFICANT CHANGE UP (ref 3.8–10.5)
WBC # FLD AUTO: 4.8 K/UL — SIGNIFICANT CHANGE UP (ref 3.8–10.5)

## 2019-07-13 PROCEDURE — 99291 CRITICAL CARE FIRST HOUR: CPT

## 2019-07-13 PROCEDURE — 99232 SBSQ HOSP IP/OBS MODERATE 35: CPT

## 2019-07-13 RX ORDER — SENNA PLUS 8.6 MG/1
2 TABLET ORAL ONCE
Refills: 0 | Status: COMPLETED | OUTPATIENT
Start: 2019-07-13 | End: 2019-07-13

## 2019-07-13 RX ORDER — DOCUSATE SODIUM 100 MG
100 CAPSULE ORAL ONCE
Refills: 0 | Status: COMPLETED | OUTPATIENT
Start: 2019-07-13 | End: 2019-07-13

## 2019-07-13 RX ORDER — POLYETHYLENE GLYCOL 3350 17 G/17G
17 POWDER, FOR SOLUTION ORAL
Refills: 0 | Status: DISCONTINUED | OUTPATIENT
Start: 2019-07-13 | End: 2019-07-16

## 2019-07-13 RX ORDER — SENNA PLUS 8.6 MG/1
2 TABLET ORAL
Refills: 0 | Status: DISCONTINUED | OUTPATIENT
Start: 2019-07-13 | End: 2019-07-16

## 2019-07-13 RX ORDER — DOCUSATE SODIUM 100 MG
100 CAPSULE ORAL THREE TIMES A DAY
Refills: 0 | Status: DISCONTINUED | OUTPATIENT
Start: 2019-07-13 | End: 2019-07-16

## 2019-07-13 RX ORDER — CEFTRIAXONE 500 MG/1
2000 INJECTION, POWDER, FOR SOLUTION INTRAMUSCULAR; INTRAVENOUS EVERY 24 HOURS
Refills: 0 | Status: DISCONTINUED | OUTPATIENT
Start: 2019-07-13 | End: 2019-07-16

## 2019-07-13 RX ADMIN — VORICONAZOLE 200 MILLIGRAM(S): 10 INJECTION, POWDER, LYOPHILIZED, FOR SOLUTION INTRAVENOUS at 18:22

## 2019-07-13 RX ADMIN — CEFTRIAXONE 100 MILLIGRAM(S): 500 INJECTION, POWDER, FOR SOLUTION INTRAMUSCULAR; INTRAVENOUS at 15:54

## 2019-07-13 RX ADMIN — VORICONAZOLE 200 MILLIGRAM(S): 10 INJECTION, POWDER, LYOPHILIZED, FOR SOLUTION INTRAVENOUS at 05:49

## 2019-07-13 RX ADMIN — SERTRALINE 25 MILLIGRAM(S): 25 TABLET, FILM COATED ORAL at 11:50

## 2019-07-13 RX ADMIN — Medication 1 PATCH: at 09:00

## 2019-07-13 RX ADMIN — SENNA PLUS 2 TABLET(S): 8.6 TABLET ORAL at 18:22

## 2019-07-13 RX ADMIN — SODIUM CHLORIDE 100 MILLILITER(S): 9 INJECTION INTRAMUSCULAR; INTRAVENOUS; SUBCUTANEOUS at 11:51

## 2019-07-13 RX ADMIN — SODIUM CHLORIDE 100 MILLILITER(S): 9 INJECTION INTRAMUSCULAR; INTRAVENOUS; SUBCUTANEOUS at 20:14

## 2019-07-13 RX ADMIN — Medication 1 PATCH: at 18:20

## 2019-07-13 RX ADMIN — Medication 100 MILLIGRAM(S): at 14:05

## 2019-07-13 RX ADMIN — Medication 100 MILLIGRAM(S): at 20:13

## 2019-07-13 RX ADMIN — SENNA PLUS 2 TABLET(S): 8.6 TABLET ORAL at 17:04

## 2019-07-13 RX ADMIN — Medication 100 MILLIGRAM(S): at 17:04

## 2019-07-13 NOTE — PROGRESS NOTE ADULT - PROBLEM SELECTOR PLAN 5
Patient with h/o DVT/PE and  factor V leiden  hold Xarelto for PICC on 7/14, resume after PICC  DC Xarelto when PLT <50 K Patient with h/o DVT/PE and  factor V leiden  hold Xarelto for PICC on 7/14, resume after PICC  DC Xarelto when PLT <50 K  Increased bowel regimens for constipation

## 2019-07-13 NOTE — PROGRESS NOTE ADULT - PROBLEM SELECTOR PLAN 4
Followed by Cardiology  MUGA 28%  Continue toprol xl 12.5mg daily, titrate per cardiologist  Pt was on Lisinopril 5mg in  April, ?resume

## 2019-07-13 NOTE — PROGRESS NOTE ADULT - SUBJECTIVE AND OBJECTIVE BOX
Diagnosis: AML     Protocol/Chemo Regimen: C1 HIDAC on hold     Day: NA     Pt endorsed: little pain on right great toe yesterday, not today    Review of Systems: Patient denied nausea, vomiting, odynophagia, chest pain, cough, dyspnea, abdominal pain, constipation, diarrhea, rash, fatigue, headache      Pain scale:   1-2/10     right great toe                                    Diet: Regular    Allergies: No Known Allergies      ANTIMICROBIALS  cefTRIAXone   IVPB 1000 milliGRAM(s) IV Intermittent every 24 hours  voriconazole 200 milliGRAM(s) Oral every 12 hours      HEME/ONC MEDICATIONS  rivaroxaban 20 milliGRAM(s) Oral daily      STANDING MEDICATIONS  metoprolol succinate ER 12.5 milliGRAM(s) Oral daily  nicotine - 21 mG/24Hr(s) Patch 1 patch Transdermal daily  sertraline 25 milliGRAM(s) Oral daily  sodium chloride 0.9%. 1000 milliLiter(s) IV Continuous <Continuous>      Vital Signs Last 24 Hrs  T(C): 36.4 (13 Jul 2019 05:28), Max: 36.4 (12 Jul 2019 13:40)  T(F): 97.5 (13 Jul 2019 05:28), Max: 97.6 (12 Jul 2019 13:40)  HR: 82 (13 Jul 2019 05:28) (68 - 95)  BP: 99/67 (13 Jul 2019 05:28) (99/67 - 118/71)  BP(mean): --  RR: 18 (13 Jul 2019 05:28) (18 - 18)  SpO2: 95% (13 Jul 2019 05:28) (95% - 98%)      PHYSICAL EXAM  General: adult in NAD  HEENT: clear oropharynx, anicteric sclera  Neck: supple  CV: normal S1/S2 RRR  Lungs: positive air movement b/l ant lungs,clear to auscultation, no wheezes, no rales  Abdomen: soft, + BS,  non-tender non-distended, no hepatosplenomegaly  Ext: no edema; right great toe with dressing D/C/I  Skin: no rash  Neuro: alert and oriented X 3, no focal deficits  Peripheral  Line: C/D/I        LABS:                        8.8    5.9   )-----------( 92       ( 12 Jul 2019 07:06 )             25.2         Mean Cell Volume : 95.7 fl  Mean Cell Hemoglobin : 33.6 pg  Mean Cell Hemoglobin Concentration : 35.1 gm/dL  Auto Neutrophil # : 2.8 K/uL  Auto Lymphocyte # : 2.5 K/uL  Auto Monocyte # : 0.5 K/uL  Auto Eosinophil # : 0.1 K/uL  Auto Basophil # : 0.0 K/uL  Auto Neutrophil % : 47.3 %  Auto Lymphocyte % : 42.1 %  Auto Monocyte % : 8.5 %  Auto Eosinophil % : 2.0 %  Auto Basophil % : 0.2 %      07-12    139  |  104  |  19  ----------------------------<  104<H>  4.2   |  25  |  1.45<H>    Ca    8.9      12 Jul 2019 07:05  Phos  3.9     07-12  Mg     1.8     07-12    TPro  5.7<L>  /  Alb  3.2<L>  /  TBili  0.3  /  DBili  x   /  AST  15  /  ALT  15  /  AlkPhos  81  07-12          PT/INR - ( 11 Jul 2019 14:39 )   PT: 12.3 sec;   INR: 1.08 ratio         PTT - ( 11 Jul 2019 14:39 )  PTT:27.4 sec        RECENT CULTURES:  07-11 @ 22:49  .Abscess  --  --  --    Few Coag Negative Staphylococcus "Susceptibilities not performed"  --  07-11 @ 16:58  .Blood  --  --  --    No growth to date.  --      C-Reactive Protein, Serum (07.12.19 @ 09:41)    C-Reactive Protein, Serum: 0.96 mg/dL    Sedimentation Rate, Erythrocyte (07.12.19 @ 09:56)    Sedimentation Rate, Erythrocyte: 33 mm/hr      RADIOLOGY & ADDITIONAL STUDIES:    < from: NM MUGA Scan (07.12.19 @ 08:52) >  IMPRESSION: Gated blood pool study demonstrates:  Dilated left ventricle with generalized hypokinesia and low resting left   ventricular ejection fraction of 28%.    < from: Xray Sinuses Paranasal (07.12.19 @ 09:27) >  The visualized paranasal sinuses appear well aerated, without air-fluid   levels. The patient is edentulous.      < from: MR Foot No Cont, Right (07.11.19 @ 20:20) >  Signal alteration of the distal phalanx of the great toe. Given reported   physical exam findings of overlying wound extending to bone, this is   suspicious osteomyelitis. There may be superimposed fracture of the tuft   of the distal phalanx. X-ray correlation is recommended. Diagnosis: AML     Protocol/Chemo Regimen: C1 HIDAC on hold     Day: NA     Pt endorsed: no complaint     Review of Systems: Patient denied nausea, vomiting, odynophagia, chest pain, cough, dyspnea, abdominal pain, constipation, diarrhea, rash, fatigue, headache      Pain scale:  none                              Diet: Regular    Allergies: No Known Allergies      ANTIMICROBIALS  cefTRIAXone   IVPB 1000 milliGRAM(s) IV Intermittent every 24 hours  voriconazole 200 milliGRAM(s) Oral every 12 hours      HEME/ONC MEDICATIONS  rivaroxaban 20 milliGRAM(s) Oral daily      STANDING MEDICATIONS  metoprolol succinate ER 12.5 milliGRAM(s) Oral daily  nicotine - 21 mG/24Hr(s) Patch 1 patch Transdermal daily  sertraline 25 milliGRAM(s) Oral daily  sodium chloride 0.9%. 1000 milliLiter(s) IV Continuous <Continuous>      Vital Signs Last 24 Hrs  T(C): 36.4 (13 Jul 2019 05:28), Max: 36.4 (12 Jul 2019 13:40)  T(F): 97.5 (13 Jul 2019 05:28), Max: 97.6 (12 Jul 2019 13:40)  HR: 82 (13 Jul 2019 05:28) (68 - 95)  BP: 99/67 (13 Jul 2019 05:28) (99/67 - 118/71)  BP(mean): --  RR: 18 (13 Jul 2019 05:28) (18 - 18)  SpO2: 95% (13 Jul 2019 05:28) (95% - 98%)      PHYSICAL EXAM  General: adult in NAD  HEENT: clear oropharynx, anicteric sclera  Neck: supple  CV: normal S1/S2 RRR  Lungs: positive air movement b/l ant lungs,clear to auscultation, no wheezes, no rales  Abdomen: soft, + BS,  non-tender non-distended, no hepatosplenomegaly  Ext: no edema; right great toe with dressing D/C/I  Skin: no rash  Neuro: alert and oriented X 3, no focal deficits  Peripheral  Line: C/D/I      LABS:             9.0    4.8   )-----------( 88       ( 13 Jul 2019 09:58 )             26.7         Mean Cell Volume : 97.2 fl  Mean Cell Hemoglobin : 32.9 pg  Mean Cell Hemoglobin Concentration : 33.8 gm/dL  Auto Neutrophil # : 2.1 K/uL  Auto Lymphocyte # : 2.1 K/uL  Auto Monocyte # : 0.5 K/uL  Auto Eosinophil # : 0.1 K/uL  Auto Basophil # : 0.0 K/uL  Auto Neutrophil % : 43.6 %  Auto Lymphocyte % : 44.0 %  Auto Monocyte % : 9.5 %  Auto Eosinophil % : 2.8 %  Auto Basophil % : 0.1 %      07-13    141  |  106  |  15  ----------------------------<  102<H>  4.6   |  23  |  1.50<H>    Ca    9.3      13 Jul 2019 09:58  Phos  4.3     07-13  Mg     1.8     07-13    TPro  6.0  /  Alb  3.6  /  TBili  0.3  /  DBili  x   /  AST  19  /  ALT  17  /  AlkPhos  87  07-13          RECENT CULTURES:  07-11 @ 22:49  .Abscess  --  --  --    Few Coag Negative Staphylococcus "Susceptibilities not performed"  --  07-11 @ 16:58  .Blood  --  --  --    No growth to date.  --      C-Reactive Protein, Serum (07.12.19 @ 09:41)    C-Reactive Protein, Serum: 0.96 mg/dL    Sedimentation Rate, Erythrocyte (07.12.19 @ 09:56)    Sedimentation Rate, Erythrocyte: 33 mm/hr      RADIOLOGY & ADDITIONAL STUDIES:    < from: NM MUGA Scan (07.12.19 @ 08:52) >  IMPRESSION: Gated blood pool study demonstrates:  Dilated left ventricle with generalized hypokinesia and low resting left   ventricular ejection fraction of 28%.    < from: Xray Sinuses Paranasal (07.12.19 @ 09:27) >  The visualized paranasal sinuses appear well aerated, without air-fluid   levels. The patient is edentulous.      < from: MR Foot No Cont, Right (07.11.19 @ 20:20) >  Signal alteration of the distal phalanx of the great toe. Given reported   physical exam findings of overlying wound extending to bone, this is   suspicious osteomyelitis. There may be superimposed fracture of the tuft   of the distal phalanx. X-ray correlation is recommended.

## 2019-07-13 NOTE — PROGRESS NOTE ADULT - SUBJECTIVE AND OBJECTIVE BOX
[facility] - Ede Zamudio, Riki, Edgar, Gem, Jose Willacasimiro  Office Number:  [phone]    Patient resting comfortably in bed in NAD.  Laying flat with no respiratory distress.  No complaints of chest pain, dyspnea, palpitations, PND, or orthopnea.    ROS: negative unless otherwise mentioned.    Telemetry:  off    MEDICATIONS  (STANDING):  cefTRIAXone   IVPB 1000 milliGRAM(s) IV Intermittent every 24 hours  metoprolol succinate ER 12.5 milliGRAM(s) Oral daily  nicotine - 21 mG/24Hr(s) Patch 1 patch Transdermal daily  rivaroxaban 20 milliGRAM(s) Oral daily  sertraline 25 milliGRAM(s) Oral daily  sodium chloride 0.9%. 1000 milliLiter(s) (100 mL/Hr) IV Continuous <Continuous>  voriconazole 200 milliGRAM(s) Oral every 12 hours    MEDICATIONS  (PRN):      Allergies    No Known Allergies    Intolerances        Vital Signs Last 24 Hrs  T(C): 36.4 (13 Jul 2019 05:28), Max: 36.4 (12 Jul 2019 13:40)  T(F): 97.5 (13 Jul 2019 05:28), Max: 97.6 (12 Jul 2019 13:40)  HR: 82 (13 Jul 2019 05:28) (68 - 95)  BP: 99/67 (13 Jul 2019 05:28) (99/67 - 118/71)  BP(mean): --  RR: 18 (13 Jul 2019 05:28) (18 - 18)  SpO2: 95% (13 Jul 2019 05:28) (95% - 98%)    I&O's Summary    12 Jul 2019 07:01  -  13 Jul 2019 07:00  --------------------------------------------------------  IN: 1680 mL / OUT: 2800 mL / NET: -1120 mL        ON EXAM:    Constitutional: NAD, alert and oriented x 3  Lungs:  Non-labored, breath sounds are clear bilaterally, No wheezing, rales or rhonchi  Cardiovascular: RR with occasional premature beats.  S1 and S2 positive.  No murmurs, rubs, gallops or clicks  Gastrointestinal: Bowel Sounds present, soft, nontender.   Lymph: No peripheral edema; toe wrapped. No cervical lymphadenopathy.  Neurological: Alert, no focal deficits  Skin: No rashes or ulcers   Psych:  Mood & affect appropriate.      LABS: All Labs Reviewed:                        8.8    5.9   )-----------( 92       ( 12 Jul 2019 07:06 )             25.2                         8.9    6.2   )-----------( 82       ( 11 Jul 2019 14:39 )             25.6     12 Jul 2019 07:05    139    |  104    |  19     ----------------------------<  104    4.2     |  25     |  1.45   11 Jul 2019 14:39    139    |  103    |  26     ----------------------------<  122    4.6     |  24     |  1.45     Ca    8.9        12 Jul 2019 07:05  Ca    9.1        11 Jul 2019 14:39  Phos  3.9       12 Jul 2019 07:05  Phos  3.9       11 Jul 2019 14:39  Mg     1.8       12 Jul 2019 07:05  Mg     1.8       11 Jul 2019 14:39    TPro  5.7    /  Alb  3.2    /  TBili  0.3    /  DBili  x      /  AST  15     /  ALT  15     /  AlkPhos  81     12 Jul 2019 07:05  TPro  6.0    /  Alb  3.8    /  TBili  0.3    /  DBili  x      /  AST  18     /  ALT  17     /  AlkPhos  85     11 Jul 2019 14:39    PT/INR - ( 11 Jul 2019 14:39 )   PT: 12.3 sec;   INR: 1.08 ratio         PTT - ( 11 Jul 2019 14:39 )  PTT:27.4 sec      Blood Culture: Organism --  Gram Stain Blood -- Gram Stain --  Specimen Source .Abscess  Culture-Blood --    Organism --  Gram Stain Blood -- Gram Stain --  Specimen Source .Blood  Culture-Blood --

## 2019-07-13 NOTE — PROGRESS NOTE ADULT - ASSESSMENT
60 yo M with h/o HTN, Factor V Leiden, PE/DVT, on Xarelto, ETOH use, recent quit 44 pack year smoker.  recent diagnosed AML, BM bx 4/15/2019 with AML, M1, 64 % myeloid blasts, CD34, 33, 117, 135+, Tdt -. 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 Moleculasr makers + ASXL1 & Run1, FLT -. s/p induction chemo with 5+3 4/17-4/23, day15 BM bx with 10-15 % cellular. s/p re induction with 5+2 on 5/4. BM bx 5/24 hypocellular marrow c/w treatment effect. 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 Staph epi bacteremia treatment, BAL on 5/24 +EBV, RVP + Metapneumo virus, echo EF 24 on 5/31, treated for right great toe osteomyelitis and pneumonia.   admitted on 7/11 for C1 HIDAC which is on hold for w/u of right osteomyelitis evidenced on mRI right great toe on 7/11. Pt has thrombocytopenia due to chemo and or disease. 58 yo M with h/o HTN, Factor V Leiden, PE/DVT, on Xarelto, ETOH use, recent quit 44 pack year smoker.  recent diagnosed AML, BM bx 4/15/2019 with AML, M1, 64 % myeloid blasts, CD34, 33, 117, 135+, Tdt -. 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 Moleculasr makers + ASXL1 & Run1, FLT -. s/p induction chemo with 5+3 4/17-4/23, day15 BM bx with 10-15 % cellular. s/p re induction with 5+2 on 5/4. BM bx 5/24 hypocellular marrow c/w treatment effect. 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 Staph epi bacteremia treatment, BAL on 5/24 +EBV, RVP + Metapneumo virus, echo EF 24 on 5/31, treated for right great toe osteomyelitis and pneumonia.   admitted on 7/11 for C1 HIDAC which is on hold for w/u of right osteomyelitis evidenced on mRI right great toe on 7/11. Pt has anemia and thrombocytopenia due to recent chemo and or disease.

## 2019-07-13 NOTE — PROGRESS NOTE ADULT - ATTENDING COMMENTS
58 yo M with AML s/p induction and re-induction with 7+3/5+2 at [facility] in CR1, hx factor V Leiden and DVT on anticoagulation, EtOH abuse, admitted on 7/11 for C1 HIDAC  However, R 1st toe with infection -MRI R toe done 7/11    -MRI R toe 7/11 showed signal alteration of the distal phalanx of the great toe. Given reported physical exam findings of overlying wound extending to bone, this is suspicious osteomyelitis. There may be superimposed fracture of the tuft of the distal phalanx. X-ray correlation is recommended. Will order X ray R foot today to r/o fx. ID on board -on IV Ceftriazone and po Vori, will d/w ID given osteomyelelitis. F/U BCx's from 7/11, wound Cx 7/11  -pt will need IV access for Abx and treatment  -afebrile, counts normal. Will d/w ID regarding starting chemo which will make pt pancytopenic for at least 2 wks; HIDAC on hold for now given new finding  -monitor counts

## 2019-07-13 NOTE — PROGRESS NOTE ADULT - PROBLEM SELECTOR PLAN 1
Admit for cycle 1 consolidation HIDAC which is on hold due to infectious issue  Monitor for cerebellar toxicity when chemo starts   pred forte eye drops with chemo  Monitor labs, replace blood and lytes PRN  Pain control, antiemetics, IV hydration

## 2019-07-13 NOTE — PROGRESS NOTE ADULT - PROBLEM SELECTOR PLAN 3
Patient with h/o DVT/PE and  factor V leiden  hold Xarelto for PICC on 7/14, resume after PICC  DC Xarelto when PLT <50 K

## 2019-07-13 NOTE — PROGRESS NOTE ADULT - ASSESSMENT
Mr. Delong is a 59 year old male with AML previously on anthracycline based chemotheraphy, with a reported EF in the 20's at this time, who now presents for chemotherapy and evaluation of a toe infection.  More recently, he had an echocardiogram in our office 7/2019 with mild MR, eccentric LVH with moderate LV dysfunction (EF 35-40%), and mild aortic root dilatation.  He also has a history of Factor V Leiden and VTE, and has been on Xarelto.    - No sign of acute ischemia, nor does he have any chest pain. His EKG demonstrates a RBBB, LAD, RVH and non-specific st abn. He reports a cath about 5 years ago at [facility] that was unremarkable and a stress test more recently, though results of this are unavailable.  - No sign of volume overload. He seems to be tolerating Toprol XL 12.5. Increase to 25 daily if BP can tolerate  - Mild NAGA on CMP, so hold off on ace-inhibitor, especially in the setting of borderline BP. Ideally, we would start him on lisinopril 2.5 mg po daily, though his creatinine will have to be at baseline.  - MUGA with Dilated left ventricle with generalized hypokinesia and EF 28 %, slightly lower than the echocardiogram.  - Continue Xarelto for history of VTE if platelets remain >50,000  - Watch creatinine and electrolytes. Keep K>4, Mg>2  - Will follow with you.

## 2019-07-14 DIAGNOSIS — N40.0 BENIGN PROSTATIC HYPERPLASIA WITHOUT LOWER URINARY TRACT SYMPTOMS: ICD-10-CM

## 2019-07-14 DIAGNOSIS — F32.9 MAJOR DEPRESSIVE DISORDER, SINGLE EPISODE, UNSPECIFIED: ICD-10-CM

## 2019-07-14 LAB
ALBUMIN SERPL ELPH-MCNC: 3.6 G/DL — SIGNIFICANT CHANGE UP (ref 3.3–5)
ALP SERPL-CCNC: 85 U/L — SIGNIFICANT CHANGE UP (ref 40–120)
ALT FLD-CCNC: 17 U/L — SIGNIFICANT CHANGE UP (ref 10–45)
ANION GAP SERPL CALC-SCNC: 12 MMOL/L — SIGNIFICANT CHANGE UP (ref 5–17)
AST SERPL-CCNC: 24 U/L — SIGNIFICANT CHANGE UP (ref 10–40)
BASOPHILS # BLD AUTO: 0 K/UL — SIGNIFICANT CHANGE UP (ref 0–0.2)
BASOPHILS NFR BLD AUTO: 0.2 % — SIGNIFICANT CHANGE UP (ref 0–2)
BILIRUB SERPL-MCNC: 0.2 MG/DL — SIGNIFICANT CHANGE UP (ref 0.2–1.2)
BUN SERPL-MCNC: 14 MG/DL — SIGNIFICANT CHANGE UP (ref 7–23)
CALCIUM SERPL-MCNC: 9.2 MG/DL — SIGNIFICANT CHANGE UP (ref 8.4–10.5)
CHLORIDE SERPL-SCNC: 105 MMOL/L — SIGNIFICANT CHANGE UP (ref 96–108)
CO2 SERPL-SCNC: 23 MMOL/L — SIGNIFICANT CHANGE UP (ref 22–31)
COLLECT DURATION TIME UR: 24 HR — SIGNIFICANT CHANGE UP
CREAT SERPL-MCNC: 1.56 MG/DL — HIGH (ref 0.5–1.3)
EOSINOPHIL # BLD AUTO: 0.1 K/UL — SIGNIFICANT CHANGE UP (ref 0–0.5)
EOSINOPHIL NFR BLD AUTO: 2.2 % — SIGNIFICANT CHANGE UP (ref 0–6)
GLUCOSE SERPL-MCNC: 106 MG/DL — HIGH (ref 70–99)
HCT VFR BLD CALC: 26.3 % — LOW (ref 39–50)
HGB BLD-MCNC: 9 G/DL — LOW (ref 13–17)
LYMPHOCYTES # BLD AUTO: 1.8 K/UL — SIGNIFICANT CHANGE UP (ref 1–3.3)
LYMPHOCYTES # BLD AUTO: 39.5 % — SIGNIFICANT CHANGE UP (ref 13–44)
MAGNESIUM SERPL-MCNC: 1.8 MG/DL — SIGNIFICANT CHANGE UP (ref 1.6–2.6)
MCHC RBC-ENTMCNC: 33.3 PG — SIGNIFICANT CHANGE UP (ref 27–34)
MCHC RBC-ENTMCNC: 34.3 GM/DL — SIGNIFICANT CHANGE UP (ref 32–36)
MCV RBC AUTO: 96.9 FL — SIGNIFICANT CHANGE UP (ref 80–100)
MONOCYTES # BLD AUTO: 0.4 K/UL — SIGNIFICANT CHANGE UP (ref 0–0.9)
MONOCYTES NFR BLD AUTO: 9.6 % — SIGNIFICANT CHANGE UP (ref 2–14)
NEUTROPHILS # BLD AUTO: 2.2 K/UL — SIGNIFICANT CHANGE UP (ref 1.8–7.4)
NEUTROPHILS NFR BLD AUTO: 48.5 % — SIGNIFICANT CHANGE UP (ref 43–77)
PHOSPHATE SERPL-MCNC: 4.4 MG/DL — SIGNIFICANT CHANGE UP (ref 2.5–4.5)
PLATELET # BLD AUTO: 92 K/UL — LOW (ref 150–400)
POTASSIUM SERPL-MCNC: 4.5 MMOL/L — SIGNIFICANT CHANGE UP (ref 3.5–5.3)
POTASSIUM SERPL-SCNC: 4.5 MMOL/L — SIGNIFICANT CHANGE UP (ref 3.5–5.3)
PROT 24H UR-MRATE: 180 MG/24 H — HIGH (ref 50–100)
PROT SERPL-MCNC: 5.8 G/DL — LOW (ref 6–8.3)
RBC # BLD: 2.72 M/UL — LOW (ref 4.2–5.8)
RBC # FLD: 16.6 % — HIGH (ref 10.3–14.5)
SODIUM SERPL-SCNC: 140 MMOL/L — SIGNIFICANT CHANGE UP (ref 135–145)
TOTAL VOLUME - 24 HOUR: 3000 ML — SIGNIFICANT CHANGE UP
URINE CREATININE CALCULATION: 1.1 G/24 H — SIGNIFICANT CHANGE UP (ref 1–2)
WBC # BLD: 4.6 K/UL — SIGNIFICANT CHANGE UP (ref 3.8–10.5)
WBC # FLD AUTO: 4.6 K/UL — SIGNIFICANT CHANGE UP (ref 3.8–10.5)

## 2019-07-14 PROCEDURE — 99291 CRITICAL CARE FIRST HOUR: CPT

## 2019-07-14 PROCEDURE — 99232 SBSQ HOSP IP/OBS MODERATE 35: CPT

## 2019-07-14 PROCEDURE — 71045 X-RAY EXAM CHEST 1 VIEW: CPT | Mod: 26

## 2019-07-14 RX ORDER — METOPROLOL TARTRATE 50 MG
0.5 TABLET ORAL
Qty: 0 | Refills: 0 | DISCHARGE

## 2019-07-14 RX ORDER — SODIUM CHLORIDE 9 MG/ML
10 INJECTION INTRAMUSCULAR; INTRAVENOUS; SUBCUTANEOUS
Refills: 0 | Status: DISCONTINUED | OUTPATIENT
Start: 2019-07-14 | End: 2019-07-16

## 2019-07-14 RX ORDER — FINASTERIDE 5 MG/1
5 TABLET, FILM COATED ORAL DAILY
Refills: 0 | Status: DISCONTINUED | OUTPATIENT
Start: 2019-07-14 | End: 2019-07-16

## 2019-07-14 RX ORDER — METOPROLOL TARTRATE 50 MG
0.5 TABLET ORAL
Qty: 15 | Refills: 0
Start: 2019-07-14 | End: 2019-08-12

## 2019-07-14 RX ORDER — DOCUSATE SODIUM 100 MG
1 CAPSULE ORAL
Qty: 0 | Refills: 0 | DISCHARGE
Start: 2019-07-14

## 2019-07-14 RX ORDER — RIVAROXABAN 15 MG-20MG
1 KIT ORAL
Qty: 0 | Refills: 0 | DISCHARGE

## 2019-07-14 RX ORDER — SERTRALINE 25 MG/1
1 TABLET, FILM COATED ORAL
Qty: 0 | Refills: 0 | DISCHARGE

## 2019-07-14 RX ORDER — POLYETHYLENE GLYCOL 3350 17 G/17G
17 POWDER, FOR SOLUTION ORAL
Qty: 0 | Refills: 0 | DISCHARGE
Start: 2019-07-14

## 2019-07-14 RX ORDER — SERTRALINE 25 MG/1
1 TABLET, FILM COATED ORAL
Qty: 30 | Refills: 0
Start: 2019-07-14 | End: 2019-08-12

## 2019-07-14 RX ORDER — CHLORHEXIDINE GLUCONATE 213 G/1000ML
1 SOLUTION TOPICAL
Refills: 0 | Status: DISCONTINUED | OUTPATIENT
Start: 2019-07-14 | End: 2019-07-16

## 2019-07-14 RX ORDER — FINASTERIDE 5 MG/1
1 TABLET, FILM COATED ORAL
Qty: 30 | Refills: 0
Start: 2019-07-14 | End: 2019-08-12

## 2019-07-14 RX ORDER — SENNA PLUS 8.6 MG/1
2 TABLET ORAL
Qty: 0 | Refills: 0 | DISCHARGE
Start: 2019-07-14

## 2019-07-14 RX ORDER — RIVAROXABAN 15 MG-20MG
1 KIT ORAL
Qty: 30 | Refills: 0
Start: 2019-07-14 | End: 2019-08-12

## 2019-07-14 RX ADMIN — Medication 12.5 MILLIGRAM(S): at 05:06

## 2019-07-14 RX ADMIN — Medication 1 PATCH: at 09:54

## 2019-07-14 RX ADMIN — Medication 1 PATCH: at 18:53

## 2019-07-14 RX ADMIN — Medication 100 MILLIGRAM(S): at 13:19

## 2019-07-14 RX ADMIN — Medication 100 MILLIGRAM(S): at 05:07

## 2019-07-14 RX ADMIN — SENNA PLUS 2 TABLET(S): 8.6 TABLET ORAL at 05:07

## 2019-07-14 RX ADMIN — SERTRALINE 25 MILLIGRAM(S): 25 TABLET, FILM COATED ORAL at 11:26

## 2019-07-14 RX ADMIN — Medication 100 MILLIGRAM(S): at 21:40

## 2019-07-14 RX ADMIN — Medication 1 PATCH: at 07:05

## 2019-07-14 RX ADMIN — VORICONAZOLE 200 MILLIGRAM(S): 10 INJECTION, POWDER, LYOPHILIZED, FOR SOLUTION INTRAVENOUS at 05:06

## 2019-07-14 RX ADMIN — CEFTRIAXONE 100 MILLIGRAM(S): 500 INJECTION, POWDER, FOR SOLUTION INTRAMUSCULAR; INTRAVENOUS at 14:27

## 2019-07-14 RX ADMIN — FINASTERIDE 5 MILLIGRAM(S): 5 TABLET, FILM COATED ORAL at 21:40

## 2019-07-14 RX ADMIN — VORICONAZOLE 200 MILLIGRAM(S): 10 INJECTION, POWDER, LYOPHILIZED, FOR SOLUTION INTRAVENOUS at 17:29

## 2019-07-14 NOTE — PROGRESS NOTE ADULT - PROBLEM SELECTOR PLAN 6
Pt has been on Finasteride 5 mg po qd x 6 wks for BPH, and he wishes  to resume it  I resumed  it and advised pt to fu with Dr Sorto, urologist from [facility].

## 2019-07-14 NOTE — PROGRESS NOTE ADULT - SUBJECTIVE AND OBJECTIVE BOX
[facility] - Ede Zamudio, Riki, Edgar, Gem, Tabitha Garcia  Office Number:  [phone]    Patient resting comfortably in bed in NAD.  Laying flat with no respiratory distress.  No complaints of chest pain, dyspnea, palpitations, PND, or orthopnea. feeling ok this morning.    ROS: negative unless otherwise mentioned.    Telemetry:  off    MEDICATIONS  (STANDING):  cefTRIAXone   IVPB 2000 milliGRAM(s) IV Intermittent every 24 hours  docusate sodium 100 milliGRAM(s) Oral three times a day  metoprolol succinate ER 12.5 milliGRAM(s) Oral daily  nicotine - 21 mG/24Hr(s) Patch 1 patch Transdermal daily  senna 2 Tablet(s) Oral two times a day  sertraline 25 milliGRAM(s) Oral daily  sodium chloride 0.9%. 1000 milliLiter(s) (100 mL/Hr) IV Continuous <Continuous>  voriconazole 200 milliGRAM(s) Oral every 12 hours    MEDICATIONS  (PRN):  polyethylene glycol 3350 17 Gram(s) Oral two times a day PRN Constipation      Allergies    No Known Allergies    Intolerances        Vital Signs Last 24 Hrs  T(C): 36.2 (14 Jul 2019 05:06), Max: 36.6 (13 Jul 2019 21:09)  T(F): 97.1 (14 Jul 2019 05:06), Max: 97.8 (13 Jul 2019 21:09)  HR: 91 (14 Jul 2019 05:06) (82 - 101)  BP: 109/65 (14 Jul 2019 05:06) (100/68 - 113/69)  BP(mean): --  RR: 18 (14 Jul 2019 05:06) (18 - 18)  SpO2: 96% (14 Jul 2019 05:06) (96% - 97%)    I&O's Summary    13 Jul 2019 07:01  -  14 Jul 2019 07:00  --------------------------------------------------------  IN: 2845 mL / OUT: 1600 mL / NET: 1245 mL        ON EXAM:    Constitutional: NAD, alert and oriented x 3  Lungs:  Non-labored, breath sounds are clear bilaterally, No wheezing, rales or rhonchi  Cardiovascular: RR with occasional premature beats.  S1 and S2 positive.  No murmurs, rubs, gallops or clicks  Gastrointestinal: Bowel Sounds present, soft, nontender.   Lymph: No peripheral edema; toe wrapped. No cervical lymphadenopathy.  Neurological: Alert, no focal deficits  Skin: No rashes or ulcers   Psych:  Mood & affect appropriate.    LABS: All Labs Reviewed:                        9.0    4.6   )-----------( 92       ( 14 Jul 2019 05:38 )             26.3                         9.0    4.8   )-----------( 88       ( 13 Jul 2019 09:58 )             26.7                         8.8    5.9   )-----------( 92       ( 12 Jul 2019 07:06 )             25.2     14 Jul 2019 05:38    140    |  105    |  14     ----------------------------<  106    4.5     |  23     |  1.56   13 Jul 2019 09:58    141    |  106    |  15     ----------------------------<  102    4.6     |  23     |  1.50   12 Jul 2019 07:05    139    |  104    |  19     ----------------------------<  104    4.2     |  25     |  1.45     Ca    9.2        14 Jul 2019 05:38  Ca    9.3        13 Jul 2019 09:58  Ca    8.9        12 Jul 2019 07:05  Phos  4.4       14 Jul 2019 05:38  Phos  4.3       13 Jul 2019 09:58  Phos  3.9       12 Jul 2019 07:05  Mg     1.8       14 Jul 2019 05:38  Mg     1.8       13 Jul 2019 09:58  Mg     1.8       12 Jul 2019 07:05    TPro  5.8    /  Alb  3.6    /  TBili  0.2    /  DBili  x      /  AST  24     /  ALT  17     /  AlkPhos  85     14 Jul 2019 05:38  TPro  6.0    /  Alb  3.6    /  TBili  0.3    /  DBili  x      /  AST  19     /  ALT  17     /  AlkPhos  87     13 Jul 2019 09:58  TPro  5.7    /  Alb  3.2    /  TBili  0.3    /  DBili  x      /  AST  15     /  ALT  15     /  AlkPhos  81     12 Jul 2019 07:05          Blood Culture: Organism --  Gram Stain Blood -- Gram Stain --  Specimen Source .Abscess  Culture-Blood --    Organism --  Gram Stain Blood -- Gram Stain --  Specimen Source .Blood  Culture-Blood --

## 2019-07-14 NOTE — PROGRESS NOTE ADULT - ASSESSMENT
60 yo M with h/o HTN, Factor V Leiden, PE/DVT, on Xarelto, ETOH use, recent quit 44 pack year smoker.  recent diagnosed AML, BM bx 4/15/2019 with AML, M1, 64 % myeloid blasts, CD34, 33, 117, 135+, Tdt -. 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 Moleculasr makers + ASXL1 & Run1, FLT -. s/p induction chemo with 5+3 4/17-4/23, day15 BM bx with 10-15 % cellular. s/p re induction with 5+2 on 5/4. BM bx 5/24 hypocellular marrow c/w treatment effect. 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 Staph epi bacteremia treatment, BAL on 5/24 +EBV, RVP + Metapneumo virus, echo EF 24 on 5/31, treated for right great toe osteomyelitis and pneumonia.   admitted on 7/11 for C1 HIDAC which is on hold for w/u of right osteomyelitis evidenced on mRI right great toe on 7/11. Pt has anemia and thrombocytopenia due to recent chemo and or disease.

## 2019-07-14 NOTE — PROGRESS NOTE ADULT - SUBJECTIVE AND OBJECTIVE BOX
Diagnosis: AML     Protocol/Chemo Regimen: C1 HIDAC on hold     Day: NA     Pt endorsed: no complaint     Review of Systems: Patient denied nausea, vomiting, odynophagia, chest pain, cough, dyspnea, abdominal pain, constipation, diarrhea, rash, fatigue, headache      Pain scale:  none                              Diet: Regular    Allergies: No Known Allergies          ANTIMICROBIALS  cefTRIAXone   IVPB 2000 milliGRAM(s) IV Intermittent every 24 hours  voriconazole 200 milliGRAM(s) Oral every 12 hours      HEME/ONC MEDICATIONS      STANDING MEDICATIONS  docusate sodium 100 milliGRAM(s) Oral three times a day  metoprolol succinate ER 12.5 milliGRAM(s) Oral daily  nicotine - 21 mG/24Hr(s) Patch 1 patch Transdermal daily  senna 2 Tablet(s) Oral two times a day  sertraline 25 milliGRAM(s) Oral daily  sodium chloride 0.9%. 1000 milliLiter(s) IV Continuous <Continuous>      PRN MEDICATIONS  polyethylene glycol 3350 17 Gram(s) Oral two times a day PRN        Vital Signs Last 24 Hrs  T(C): 36.2 (14 Jul 2019 05:06), Max: 36.6 (13 Jul 2019 21:09)  T(F): 97.1 (14 Jul 2019 05:06), Max: 97.8 (13 Jul 2019 21:09)  HR: 91 (14 Jul 2019 05:06) (82 - 101)  BP: 109/65 (14 Jul 2019 05:06) (100/68 - 113/69)  BP(mean): --  RR: 18 (14 Jul 2019 05:06) (18 - 18)  SpO2: 96% (14 Jul 2019 05:06) (96% - 97%)      PHYSICAL EXAM  General: adult in NAD  HEENT: clear oropharynx, anicteric sclera  Neck: supple  CV: normal S1/S2 RRR  Lungs: positive air movement b/l ant lungs,clear to auscultation, no wheezes, no rales  Abdomen: soft, + BS,  non-tender non-distended, no hepatosplenomegaly  Ext: no edema; right great toe with dressing D/C/I  Skin: no rash  Neuro: alert and oriented X 3, no focal deficits  Peripheral  Line: C/D/I          LABS:                        9.0    4.6   )-----------( 92       ( 14 Jul 2019 05:38 )             26.3         Mean Cell Volume : 96.9 fl  Mean Cell Hemoglobin : 33.3 pg  Mean Cell Hemoglobin Concentration : 34.3 gm/dL  Auto Neutrophil # : 2.2 K/uL  Auto Lymphocyte # : 1.8 K/uL  Auto Monocyte # : 0.4 K/uL  Auto Eosinophil # : 0.1 K/uL  Auto Basophil # : 0.0 K/uL  Auto Neutrophil % : 48.5 %  Auto Lymphocyte % : 39.5 %  Auto Monocyte % : 9.6 %  Auto Eosinophil % : 2.2 %  Auto Basophil % : 0.2 %      07-14    140  |  105  |  14  ----------------------------<  106<H>  4.5   |  23  |  1.56<H>    Ca    9.2      14 Jul 2019 05:38  Phos  4.4     07-14  Mg     1.8     07-14    TPro  5.8<L>  /  Alb  3.6  /  TBili  0.2  /  DBili  x   /  AST  24  /  ALT  17  /  AlkPhos  85  07-14      Mg 1.8  Phos 4.4  Mg 1.8  Phos 4.3      C-Reactive Protein, Serum (07.12.19 @ 09:41)    C-Reactive Protein, Serum: 0.96 mg/dL    Sedimentation Rate, Erythrocyte (07.12.19 @ 09:56)    Sedimentation Rate, Erythrocyte: 33 mm/hr            RECENT CULTURES:  07-11 @ 22:49  .Abscess  --  --  --    Few Coag Negative Staphylococcus "Susceptibilities not performed"  --  07-11 @ 16:58  .Blood  --  --  --    No growth to date.  --      RADIOLOGY & ADDITIONAL STUDIES:    < from: NM MUGA Scan (07.12.19 @ 08:52) >  IMPRESSION: Gated blood pool study demonstrates:  Dilated left ventricle with generalized hypokinesia and low resting left   ventricular ejection fraction of 28%.    < from: Xray Sinuses Paranasal (07.12.19 @ 09:27) >  The visualized paranasal sinuses appear well aerated, without air-fluid   levels. The patient is edentulous.      < from: MR Foot No Cont, Right (07.11.19 @ 20:20) >  Signal alteration of the distal phalanx of the great toe. Given reported   physical exam findings of overlying wound extending to bone, this is   suspicious osteomyelitis. There may be superimposed fracture of the tuft   of the distal phalanx. X-ray correlation is recommended. Diagnosis: AML     Protocol/Chemo Regimen: C1 HIDAC on hold     Day: NA     Pt endorsed: no complaint     Review of Systems: Patient denied nausea, vomiting, odynophagia, chest pain, cough, dyspnea, abdominal pain, constipation, diarrhea, rash, fatigue, headache    Pain scale:  none                              Diet: Regular    Allergies: No Known Allergies    ANTIMICROBIALS  cefTRIAXone   IVPB 2000 milliGRAM(s) IV Intermittent every 24 hours  voriconazole 200 milliGRAM(s) Oral every 12 hours      STANDING MEDICATIONS  docusate sodium 100 milliGRAM(s) Oral three times a day  metoprolol succinate ER 12.5 milliGRAM(s) Oral daily  nicotine - 21 mG/24Hr(s) Patch 1 patch Transdermal daily  senna 2 Tablet(s) Oral two times a day  sertraline 25 milliGRAM(s) Oral daily  sodium chloride 0.9%. 1000 milliLiter(s) IV Continuous <Continuous>      PRN MEDICATIONS  polyethylene glycol 3350 17 Gram(s) Oral two times a day PRN      Vital Signs Last 24 Hrs  T(C): 36.2 (14 Jul 2019 05:06), Max: 36.6 (13 Jul 2019 21:09)  T(F): 97.1 (14 Jul 2019 05:06), Max: 97.8 (13 Jul 2019 21:09)  HR: 91 (14 Jul 2019 05:06) (82 - 101)  BP: 109/65 (14 Jul 2019 05:06) (100/68 - 113/69)  BP(mean): --  RR: 18 (14 Jul 2019 05:06) (18 - 18)  SpO2: 96% (14 Jul 2019 05:06) (96% - 97%)      PHYSICAL EXAM  General: adult in NAD  HEENT: clear oropharynx, anicteric sclera  Neck: supple  CV: normal S1/S2 RRR  Lungs: positive air movement b/l ant lungs,clear to auscultation, no wheezes, no rales  Abdomen: soft, + BS,  non-tender non-distended, no hepatosplenomegaly  Ext: no edema; right great toe with dressing D/C/I  Skin: no rash  Neuro: alert and oriented X 3, no focal deficits  Peripheral  Line: C/D/I      LABS:                        9.0    4.6   )-----------( 92       ( 14 Jul 2019 05:38 )             26.3         Mean Cell Volume : 96.9 fl  Mean Cell Hemoglobin : 33.3 pg  Mean Cell Hemoglobin Concentration : 34.3 gm/dL  Auto Neutrophil # : 2.2 K/uL  Auto Lymphocyte # : 1.8 K/uL  Auto Monocyte # : 0.4 K/uL  Auto Eosinophil # : 0.1 K/uL  Auto Basophil # : 0.0 K/uL  Auto Neutrophil % : 48.5 %  Auto Lymphocyte % : 39.5 %  Auto Monocyte % : 9.6 %  Auto Eosinophil % : 2.2 %  Auto Basophil % : 0.2 %      07-14    140  |  105  |  14  ----------------------------<  106<H>  4.5   |  23  |  1.56<H>    Ca    9.2      14 Jul 2019 05:38  Phos  4.4     07-14  Mg     1.8     07-14    TPro  5.8<L>  /  Alb  3.6  /  TBili  0.2  /  DBili  x   /  AST  24  /  ALT  17  /  AlkPhos  85  07-14      C-Reactive Protein, Serum (07.12.19 @ 09:41)    C-Reactive Protein, Serum: 0.96 mg/dL    Sedimentation Rate, Erythrocyte (07.12.19 @ 09:56)    Sedimentation Rate, Erythrocyte: 33 mm/hr      RECENT CULTURES:  07-11 @ 22:49  .Abscess  --  --  --    Few Coag Negative Staphylococcus "Susceptibilities not performed"  --  07-11 @ 16:58  .Blood  --  --  --    No growth to date.  --      RADIOLOGY & ADDITIONAL STUDIES:    < from: Xray Foot AP + Lateral + Oblique, Right (07.12.19 @ 21:03) >  Soft tissue defect at the distal aspect of the great toe. Extensive   cortical erosive changes in the distal phalanx of the great toe likely   represent osteomyelitis, as demonstrated on prior MRI. No acute displaced   fracture is seen in the great toe. There is soft tissue swelling of the   great toe. Diffuse vascular calcifications.    < from: NM MUGA Scan (07.12.19 @ 08:52) >  IMPRESSION: Gated blood pool study demonstrates:  Dilated left ventricle with generalized hypokinesia and low resting left   ventricular ejection fraction of 28%.    < from: Xray Sinuses Paranasal (07.12.19 @ 09:27) >  The visualized paranasal sinuses appear well aerated, without air-fluid   levels. The patient is edentulous.      < from: MR Foot No Cont, Right (07.11.19 @ 20:20) >  Signal alteration of the distal phalanx of the great toe. Given reported   physical exam findings of overlying wound extending to bone, this is   suspicious osteomyelitis. There may be superimposed fracture of the tuft   of the distal phalanx. X-ray correlation is recommended.

## 2019-07-14 NOTE — PROGRESS NOTE ADULT - PROBLEM SELECTOR PLAN 5
Patient with h/o DVT/PE and  factor V leiden  hold Xarelto for PICC on 7/14, resume after PICC  DC Xarelto when PLT <50 K  Increased bowel regimens for constipation Pt started Zoloft 25 mg po qd at [facility] and wish to increase dose

## 2019-07-14 NOTE — PROGRESS NOTE ADULT - ASSESSMENT
Mr. Delong is a 59 year old male with AML previously on anthracycline based chemotherapy with a reported EF in the 20's at this time, who now presents for chemotherapy and evaluation of a toe infection.  More recently, he had an echocardiogram in our office 7/2019 with mild MR, eccentric LVH with moderate LV dysfunction (EF 35-40%), and mild aortic root dilatation.  He also has a history of Factor V Leiden and VTE, and has been on Xarelto.    - No sign of acute ischemia, nor does he have any chest pain. His EKG demonstrates a RBBB, LAD, RVH and non-specific st abn. He reports a cath about 5 years ago at [facility] that was unremarkable and a stress test more recently, though results of this are unavailable.  - No sign of volume overload. He seems to be tolerating Toprol XL 12.5. Increase to 25 today if BP can tolerate.  - Mild NAGA on CMP, so hold off on ace-inhibitor, especially in the setting of borderline BP. Ideally, we would start him on lisinopril 2.5 mg po daily, though his creatinine will have to be at baseline.  - MUGA with Dilated left ventricle with generalized hypokinesia and EF 28 %, slightly lower than the echocardiogram.  - Continue Xarelto for history of VTE if platelets remain >50,000  - Watch creatinine and electrolytes. Keep K>4, Mg>2  - Will follow with you.

## 2019-07-14 NOTE — PROGRESS NOTE ADULT - ATTENDING COMMENTS
60 yo M with AML s/p induction and re-induction with 7+3/5+2 at [place] in CR1, hx factor V Leiden and DVT on anticoagulation, EtOH abuse, admitted on 7/11 for C1 HIDAC  However, R 1st toe with infection -MRI R toe done 7/11    -MRI R toe 7/11 showed signal alteration of the distal phalanx of the great toe. Given reported physical exam findings of overlying wound extending to bone, this is suspicious osteomyelitis. There may be superimposed fracture of the tuft of the distal phalanx. X-ray correlation is recommended. Will order X ray R foot today to r/o fx. ID on board -on IV Ceftriazone and po Vori, will d/w ID given osteomyelelitis. F/U BCx's from 7/11, wound Cx 7/11  -pt will need IV access for Abx and treatment  -afebrile, counts normal. Will d/w ID regarding starting chemo which will make pt pancytopenic for at least 2 wks; HIDAC on hold for now given new finding  -monitor counts

## 2019-07-14 NOTE — PROGRESS NOTE ADULT - PROBLEM SELECTOR PLAN 7
Patient with h/o DVT/PE and  factor V leiden  hold Xarelto for PICC on 7/14, resume after PICC  DC Xarelto when PLT <50 K  Increased bowel regimens for constipation

## 2019-07-14 NOTE — PROGRESS NOTE ADULT - PROBLEM SELECTOR PLAN 2
Patient is not neutropenic, afebrile   If febrile pan cultures and CXR   Continue Ceftriaxone  FU cultures: blood, wound  Pediatrist following   PT wound care to see pt  Local wound care  X ray of right foot Patient is not neutropenic, afebrile   If febrile pan cultures and CXR   Continue Ceftriaxone for right great toe Osteo  FU cultures: blood and wound  7/14 I spoke to Dr. Vazquez Pediatrist resident  B [phone], she said pt can weight bear as tolerared  7/14 I  spoke to Dr Michael, covering physician for Dr Gordon Ford( [phone]) she  said Ceftriaxone cover wound cx +coag neg staph   PT wound care to see pt, requested   Local wound care

## 2019-07-15 LAB
ALBUMIN SERPL ELPH-MCNC: 3.3 G/DL — SIGNIFICANT CHANGE UP (ref 3.3–5)
ALP SERPL-CCNC: 79 U/L — SIGNIFICANT CHANGE UP (ref 40–120)
ALT FLD-CCNC: 16 U/L — SIGNIFICANT CHANGE UP (ref 10–45)
ANION GAP SERPL CALC-SCNC: 10 MMOL/L — SIGNIFICANT CHANGE UP (ref 5–17)
AST SERPL-CCNC: 16 U/L — SIGNIFICANT CHANGE UP (ref 10–40)
BASOPHILS # BLD AUTO: 0 K/UL — SIGNIFICANT CHANGE UP (ref 0–0.2)
BASOPHILS NFR BLD AUTO: 1 % — SIGNIFICANT CHANGE UP (ref 0–2)
BILIRUB SERPL-MCNC: 0.2 MG/DL — SIGNIFICANT CHANGE UP (ref 0.2–1.2)
BLD GP AB SCN SERPL QL: NEGATIVE — SIGNIFICANT CHANGE UP
BUN SERPL-MCNC: 12 MG/DL — SIGNIFICANT CHANGE UP (ref 7–23)
CALCIUM SERPL-MCNC: 9.3 MG/DL — SIGNIFICANT CHANGE UP (ref 8.4–10.5)
CHLORIDE SERPL-SCNC: 107 MMOL/L — SIGNIFICANT CHANGE UP (ref 96–108)
CHROM ANALY OVERALL INTERP SPEC-IMP: SIGNIFICANT CHANGE UP
CO2 SERPL-SCNC: 24 MMOL/L — SIGNIFICANT CHANGE UP (ref 22–31)
CREAT SERPL-MCNC: 1.5 MG/DL — HIGH (ref 0.5–1.3)
EOSINOPHIL # BLD AUTO: 0.1 K/UL — SIGNIFICANT CHANGE UP (ref 0–0.5)
EOSINOPHIL NFR BLD AUTO: 2.5 % — SIGNIFICANT CHANGE UP (ref 0–6)
GLUCOSE SERPL-MCNC: 98 MG/DL — SIGNIFICANT CHANGE UP (ref 70–99)
HCT VFR BLD CALC: 24.6 % — LOW (ref 39–50)
HGB BLD-MCNC: 8.3 G/DL — LOW (ref 13–17)
LYMPHOCYTES # BLD AUTO: 1.9 K/UL — SIGNIFICANT CHANGE UP (ref 1–3.3)
LYMPHOCYTES # BLD AUTO: 42.5 % — SIGNIFICANT CHANGE UP (ref 13–44)
MAGNESIUM SERPL-MCNC: 1.8 MG/DL — SIGNIFICANT CHANGE UP (ref 1.6–2.6)
MCHC RBC-ENTMCNC: 32.9 PG — SIGNIFICANT CHANGE UP (ref 27–34)
MCHC RBC-ENTMCNC: 33.7 GM/DL — SIGNIFICANT CHANGE UP (ref 32–36)
MCV RBC AUTO: 97.4 FL — SIGNIFICANT CHANGE UP (ref 80–100)
MONOCYTES # BLD AUTO: 0.5 K/UL — SIGNIFICANT CHANGE UP (ref 0–0.9)
MONOCYTES NFR BLD AUTO: 10.1 % — SIGNIFICANT CHANGE UP (ref 2–14)
NEUTROPHILS # BLD AUTO: 2 K/UL — SIGNIFICANT CHANGE UP (ref 1.8–7.4)
NEUTROPHILS NFR BLD AUTO: 43.9 % — SIGNIFICANT CHANGE UP (ref 43–77)
PHOSPHATE SERPL-MCNC: 4.4 MG/DL — SIGNIFICANT CHANGE UP (ref 2.5–4.5)
PLAT MORPH BLD: NORMAL — SIGNIFICANT CHANGE UP
PLATELET # BLD AUTO: 80 K/UL — LOW (ref 150–400)
POTASSIUM SERPL-MCNC: 4.3 MMOL/L — SIGNIFICANT CHANGE UP (ref 3.5–5.3)
POTASSIUM SERPL-SCNC: 4.3 MMOL/L — SIGNIFICANT CHANGE UP (ref 3.5–5.3)
PROT SERPL-MCNC: 5.6 G/DL — LOW (ref 6–8.3)
RBC # BLD: 2.53 M/UL — LOW (ref 4.2–5.8)
RBC # FLD: 16.7 % — HIGH (ref 10.3–14.5)
RBC BLD AUTO: NORMAL — SIGNIFICANT CHANGE UP
RH IG SCN BLD-IMP: POSITIVE — SIGNIFICANT CHANGE UP
SODIUM SERPL-SCNC: 141 MMOL/L — SIGNIFICANT CHANGE UP (ref 135–145)
WBC # BLD: 4.5 K/UL — SIGNIFICANT CHANGE UP (ref 3.8–10.5)
WBC # FLD AUTO: 4.5 K/UL — SIGNIFICANT CHANGE UP (ref 3.8–10.5)

## 2019-07-15 PROCEDURE — 99232 SBSQ HOSP IP/OBS MODERATE 35: CPT

## 2019-07-15 RX ORDER — METOPROLOL TARTRATE 50 MG
0.5 TABLET ORAL
Qty: 30 | Refills: 1
Start: 2019-07-15 | End: 2019-09-12

## 2019-07-15 RX ORDER — VORICONAZOLE 10 MG/ML
1 INJECTION, POWDER, LYOPHILIZED, FOR SOLUTION INTRAVENOUS
Qty: 60 | Refills: 1
Start: 2019-07-15 | End: 2019-09-12

## 2019-07-15 RX ORDER — SERTRALINE 25 MG/1
50 TABLET, FILM COATED ORAL DAILY
Refills: 0 | Status: DISCONTINUED | OUTPATIENT
Start: 2019-07-15 | End: 2019-07-16

## 2019-07-15 RX ORDER — METOPROLOL TARTRATE 50 MG
12.5 TABLET ORAL
Refills: 0 | Status: DISCONTINUED | OUTPATIENT
Start: 2019-07-15 | End: 2019-07-16

## 2019-07-15 RX ORDER — RIVAROXABAN 15 MG-20MG
1 KIT ORAL
Qty: 30 | Refills: 1
Start: 2019-07-15 | End: 2019-09-12

## 2019-07-15 RX ORDER — CEFTRIAXONE 500 MG/1
2 INJECTION, POWDER, FOR SOLUTION INTRAMUSCULAR; INTRAVENOUS
Qty: 0 | Refills: 0 | DISCHARGE
Start: 2019-07-15

## 2019-07-15 RX ORDER — SERTRALINE 25 MG/1
2 TABLET, FILM COATED ORAL
Qty: 60 | Refills: 0
Start: 2019-07-15 | End: 2019-08-13

## 2019-07-15 RX ORDER — RIVAROXABAN 15 MG-20MG
20 KIT ORAL
Refills: 0 | Status: DISCONTINUED | OUTPATIENT
Start: 2019-07-15 | End: 2019-07-16

## 2019-07-15 RX ADMIN — Medication 12.5 MILLIGRAM(S): at 17:32

## 2019-07-15 RX ADMIN — SODIUM CHLORIDE 100 MILLILITER(S): 9 INJECTION INTRAMUSCULAR; INTRAVENOUS; SUBCUTANEOUS at 06:37

## 2019-07-15 RX ADMIN — SERTRALINE 25 MILLIGRAM(S): 25 TABLET, FILM COATED ORAL at 11:47

## 2019-07-15 RX ADMIN — FINASTERIDE 5 MILLIGRAM(S): 5 TABLET, FILM COATED ORAL at 11:47

## 2019-07-15 RX ADMIN — RIVAROXABAN 20 MILLIGRAM(S): KIT at 17:32

## 2019-07-15 RX ADMIN — Medication 100 MILLIGRAM(S): at 14:29

## 2019-07-15 RX ADMIN — VORICONAZOLE 200 MILLIGRAM(S): 10 INJECTION, POWDER, LYOPHILIZED, FOR SOLUTION INTRAVENOUS at 17:32

## 2019-07-15 RX ADMIN — CEFTRIAXONE 100 MILLIGRAM(S): 500 INJECTION, POWDER, FOR SOLUTION INTRAMUSCULAR; INTRAVENOUS at 14:11

## 2019-07-15 RX ADMIN — SENNA PLUS 2 TABLET(S): 8.6 TABLET ORAL at 06:36

## 2019-07-15 RX ADMIN — Medication 1 PATCH: at 08:44

## 2019-07-15 RX ADMIN — Medication 1 PATCH: at 20:20

## 2019-07-15 RX ADMIN — VORICONAZOLE 200 MILLIGRAM(S): 10 INJECTION, POWDER, LYOPHILIZED, FOR SOLUTION INTRAVENOUS at 06:37

## 2019-07-15 RX ADMIN — Medication 1 PATCH: at 08:48

## 2019-07-15 RX ADMIN — CHLORHEXIDINE GLUCONATE 1 APPLICATION(S): 213 SOLUTION TOPICAL at 06:39

## 2019-07-15 RX ADMIN — Medication 100 MILLIGRAM(S): at 06:36

## 2019-07-15 NOTE — PROGRESS NOTE ADULT - PROBLEM SELECTOR PLAN 7
Patient with h/o DVT/PE and  factor V leiden  hold Xarelto for PICC on 7/14, resume after PICC  DC Xarelto when PLT <50 K  Increased bowel regimens for constipation Patient with h/o DVT/PE and  factor V leiden  DC Xarelto when PLT <50 K  Increased bowel regimens for constipation

## 2019-07-15 NOTE — PROGRESS NOTE ADULT - ASSESSMENT
60 yo M presents with right hallux wound to bone  - vitals stable, no leukocytosis (WBC 4.5), ESR 33  - MRI: showing OM to the right hallux distal phalanx   - wound probe to bone with fibrotic tissue, no active drainage, no pus, no malodor. Mild cellulitis and dactylitis to the hallux   - pending wound culture   - Patient has OM to the right hallux, however, it is not actively draining, no pus, no malodor.    - Continue IV antibiotics per ID   - ID is considering sending the patient home with PICC to manage the OM (Possibly 7/15)  - Patient stable for discharge from podiatry standpoint   - Follow up as outpt w/ Dr. Per Estrada in office  - Possible surgical resection of the OM to be reevaluated as outpt  - Seen with attending

## 2019-07-15 NOTE — PROGRESS NOTE ADULT - SUBJECTIVE AND OBJECTIVE BOX
Patient is a 59y old  Male who presents with a chief complaint of Cycle 1 HIDAC (15 Jul 2019 07:29)       INTERVAL HPI/OVERNIGHT EVENTS:  Patient seen and evaluated at bedside.  Pt is resting comfortable in NAD. Denies N/V/F/C.      Allergies    No Known Allergies    Intolerances        Vital Signs Last 24 Hrs  T(C): 35.8 (15 Jul 2019 06:27), Max: 36.8 (14 Jul 2019 21:09)  T(F): 96.4 (15 Jul 2019 06:27), Max: 98.2 (14 Jul 2019 21:09)  HR: 91 (15 Jul 2019 06:27) (83 - 96)  BP: 98/71 (15 Jul 2019 06:27) (98/71 - 119/79)  BP(mean): --  RR: 18 (15 Jul 2019 06:27) (18 - 18)  SpO2: 94% (15 Jul 2019 06:27) (93% - 98%)    LABS:                        8.3    4.5   )-----------( 80       ( 15 Jul 2019 06:57 )             24.6     07-15    141  |  107  |  12  ----------------------------<  98  4.3   |  24  |  1.50<H>    Ca    9.3      15 Jul 2019 06:52  Phos  4.4     07-15  Mg     1.8     07-15    TPro  5.6<L>  /  Alb  3.3  /  TBili  0.2  /  DBili  x   /  AST  16  /  ALT  16  /  AlkPhos  79  07-15        CAPILLARY BLOOD GLUCOSE          Lower Extremity Physical Exam:  Vasular: DP/PT 2/4, B/L, CFT <5 seconds B/L, Temperature gradient normal, B/L.   Neuro: Epicritic sensation intact to the level of digits, B/L.  Musculoskeletal/Ortho: no gross deformity bl  Skin: right hallux nail bed wound to bone, fibrotic, no active drainage, no pus, no malodor. There is slight erythema and mild dactylitis to the right hallux.   etiology: patient injured the nail months ago     RADIOLOGY & ADDITIONAL TESTS:    < from: MR Foot No Cont, Right (07.11.19 @ 20:20) >  PROCEDURE DATE:  07/11/2019            INTERPRETATION:    RIGHT FOOT MRI    CLINICAL INFORMATION: Right first toe wound to bone, suspected   osteomyelitis.  TECHNIQUE: Multiplanar, multisequence MRI was obtained of the right foot.   Images were obtained through the forefoot.  COMPARISON: None available    FINDINGS:    There is high T2 and low T1 signal of the distal phalanx of the great   toe. The high T2 signal involves nearly the entirety of the digit,   however the low T1 signal does not extend to involve the entirety of the   distal phalanx, the base is spared of low T1 signal. The reported   overlying skin skin wound is not well appreciated. There may be an   associated fracture at the tuft of the distal phalanx There is no   associated joint effusion of the interphalangeal joint. There are no   other areas of marrow signal alteration which are suspicious for   osteomyelitis.    The flexor and extensor tendons appear intact. The Lisfrancligament   appears intact.    There is dorsal subcutaneous soft tissue edema. There is muscular edema   and atrophy, consistent with denervation. There is no fluid collection   identified.      IMPRESSION:    Signal alteration of the distal phalanx of the great toe. Given reported   physical exam findings of overlying wound extending to bone, this is   suspicious osteomyelitis. There may be superimposed fracture of the tuft   of the distal phalanx. X-ray correlation is recommended.                LILIAM HARDING MD,RADIOLOGY FELLOW  This document has been electronically signed.  JUSTIN MULLINS M.D., ATTENDING RADIOLOGIST  This document has been electronically signed. Jul 12 2019  9:49AM    < end of copied text >

## 2019-07-15 NOTE — PROGRESS NOTE ADULT - PROBLEM SELECTOR PLAN 2
Patient is not neutropenic, afebrile   If febrile pan cultures and CXR   Continue Ceftriaxone for right great toe Osteo  FU cultures: blood and wound  7/14 I spoke to Dr. Vazquez Pediatrist resident  B [phone], she said pt can weight bear as tolerared  7/14 I  spoke to Dr Michael, covering physician for Dr Gordon Ford( [phone]) she  said Ceftriaxone cover wound cx +coag neg staph   PT wound care to see pt, requested   Local wound care Patient is not neutropenic, afebrile   If febrile pan cultures and CXR   Continue Ceftriaxone for right great toe Osteo  FU cultures: blood and wound  7/14 Per podiatry she said pt can weight bear as tolerared  PT wound care to see pt, requested   Local wound care  Ongoing discussion with Dr. Coelho and ID as whether to treat infection for one week to avoid amputation or to move forward with it. In either case, per Podiatry can be done OP.

## 2019-07-15 NOTE — PROGRESS NOTE ADULT - PROBLEM SELECTOR PLAN 3
Patient with h/o DVT/PE and  factor V leiden  hold Xarelto for PICC on 7/14, resume after PICC  DC Xarelto when PLT <50 K Patient with h/o DVT/PE and  factor V leiden  restart Xarelto 7/15  DC Xarelto when PLT <50 K

## 2019-07-15 NOTE — PROGRESS NOTE ADULT - ATTENDING COMMENTS
58 yo M with AML s/p induction and re-induction with 7+3/5+2 at [place] in CR1, hx factor V Leiden and DVT on anticoagulation, EtOH abuse, admitted on 7/11 for C1 HIDAC  However, R 1st toe with infection -MRI R toe done 7/11    -MRI R toe 7/11 showed signal alteration of the distal phalanx of the great toe. Given reported physical exam findings of overlying wound extending to bone, this is suspicious osteomyelitis. There may be superimposed fracture of the tuft of the distal phalanx. X-ray correlation is recommended. Will order X ray R foot today to r/o fx. ID on board -on IV Ceftriazone and po Vori, will d/w ID given osteomyelelitis. F/U BCx's from 7/11, wound Cx 7/11  -pt will need IV access for Abx and treatment  -afebrile, counts normal. Will d/w ID regarding starting chemo which will make pt pancytopenic for at least 2 wks; HIDAC on hold for now given new finding  -monitor counts 60 yo M with AML s/p induction and re-induction with 7+3/5+2 at [place] in CR1, hx factor V Leiden and DVT on anticoagulation, EtOH abuse, admitted on 7/11 for C1 HIDAC However, R 1st toe with infection -MRI R toe done 7/11 --? osteomyelitis. Requires course of IV antibiotics and possibly amputation. HiDAC to be held due to this.    -MRI R toe 7/11 showed signal alteration of the distal phalanx of the great toe. Given reported physical exam findings of overlying wound extending to bone, this is suspicious osteomyelitis. There may be superimposed fracture of the tuft of the distal phalanx. X-ray correlation is recommended.  ID on board -on IV Ceftriazone and po Vori, will d/w ID given osteomyelelitis. F/U BCx's from 7/11, wound Cx 7/11  -PICC placed  -monitor counts. Hgb and platelets drifting down.   Resume Xarelto.

## 2019-07-15 NOTE — PROGRESS NOTE ADULT - PROBLEM SELECTOR PLAN 4
Followed by Cardiology  MUGA 28%  Continue toprol xl 12.5mg daily, titrate per cardiologist  Pt was on Lisinopril 5mg in  April, ?resume Followed by Cardiology  MUGA 28%  increase toprol xl 12.5mg BID, titrate per cardiologist

## 2019-07-15 NOTE — PROGRESS NOTE ADULT - PROBLEM SELECTOR PLAN 5
Pt started Zoloft 25 mg po qd at [facility] and wish to increase dose Continue with Zoloft  Increase to 50mg daily.

## 2019-07-15 NOTE — PROGRESS NOTE ADULT - SUBJECTIVE AND OBJECTIVE BOX
[facility] - Ede Zamudio, Riki, Edgar, Gem, Tabitha Garcia  Office Number:  [phone]    Patient resting comfortably in bed in NAD.  Laying flat with no respiratory distress.  No complaints of chest pain, dyspnea, palpitations, PND, or orthopnea.  Says he is feeling well and wants to go home.    ROS: negative unless otherwise mentioned.    Telemetry:  off    MEDICATIONS  (STANDING):  cefTRIAXone   IVPB 2000 milliGRAM(s) IV Intermittent every 24 hours  chlorhexidine 4% Liquid 1 Application(s) Topical <User Schedule>  docusate sodium 100 milliGRAM(s) Oral three times a day  finasteride 5 milliGRAM(s) Oral daily  metoprolol succinate ER 12.5 milliGRAM(s) Oral two times a day  nicotine - 21 mG/24Hr(s) Patch 1 patch Transdermal daily  senna 2 Tablet(s) Oral two times a day  sertraline 25 milliGRAM(s) Oral daily  sodium chloride 0.9%. 1000 milliLiter(s) (100 mL/Hr) IV Continuous <Continuous>  voriconazole 200 milliGRAM(s) Oral every 12 hours    MEDICATIONS  (PRN):  polyethylene glycol 3350 17 Gram(s) Oral two times a day PRN Constipation  sodium chloride 0.9% lock flush 10 milliLiter(s) IV Push every 1 hour PRN Pre/post blood products, medications, blood draw, and to maintain line patency      Allergies    No Known Allergies    Intolerances        Vital Signs Last 24 Hrs  T(C): 35.8 (15 Jul 2019 06:27), Max: 36.8 (14 Jul 2019 21:09)  T(F): 96.4 (15 Jul 2019 06:27), Max: 98.2 (14 Jul 2019 21:09)  HR: 91 (15 Jul 2019 06:27) (83 - 96)  BP: 98/71 (15 Jul 2019 06:27) (98/71 - 119/79)  BP(mean): --  RR: 18 (15 Jul 2019 06:27) (18 - 18)  SpO2: 94% (15 Jul 2019 06:27) (93% - 98%)    I&O's Summary    14 Jul 2019 07:01  -  15 Jul 2019 07:00  --------------------------------------------------------  IN: 1140 mL / OUT: 1450 mL / NET: -310 mL        ON EXAM:    Constitutional: NAD, alert and oriented x 3  Lungs:  Non-labored, breath sounds are clear bilaterally, No wheezing, rales or rhonchi  Cardiovascular: RR with occasional premature beats.  S1 and S2 positive.  No murmurs, rubs, gallops or clicks  Gastrointestinal: Bowel Sounds present, soft, nontender.   Lymph: No peripheral edema; toe wrapped. No cervical lymphadenopathy.  Neurological: Alert, no focal deficits  Skin: No rashes or ulcers   Psych:  Mood & affect appropriate.      LABS: All Labs Reviewed:                        8.3    4.5   )-----------( 80       ( 15 Jul 2019 06:57 )             24.6                         9.0    4.6   )-----------( 92       ( 14 Jul 2019 05:38 )             26.3                         9.0    4.8   )-----------( 88       ( 13 Jul 2019 09:58 )             26.7     15 Jul 2019 06:52    141    |  107    |  12     ----------------------------<  98     4.3     |  24     |  1.50   14 Jul 2019 05:38    140    |  105    |  14     ----------------------------<  106    4.5     |  23     |  1.56   13 Jul 2019 09:58    141    |  106    |  15     ----------------------------<  102    4.6     |  23     |  1.50     Ca    9.3        15 Jul 2019 06:52  Ca    9.2        14 Jul 2019 05:38  Ca    9.3        13 Jul 2019 09:58  Phos  4.4       15 Jul 2019 06:52  Phos  4.4       14 Jul 2019 05:38  Phos  4.3       13 Jul 2019 09:58  Mg     1.8       15 Jul 2019 06:52  Mg     1.8       14 Jul 2019 05:38  Mg     1.8       13 Jul 2019 09:58    TPro  5.6    /  Alb  3.3    /  TBili  0.2    /  DBili  x      /  AST  16     /  ALT  16     /  AlkPhos  79     15 Jul 2019 06:52  TPro  5.8    /  Alb  3.6    /  TBili  0.2    /  DBili  x      /  AST  24     /  ALT  17     /  AlkPhos  85     14 Jul 2019 05:38  TPro  6.0    /  Alb  3.6    /  TBili  0.3    /  DBili  x      /  AST  19     /  ALT  17     /  AlkPhos  87     13 Jul 2019 09:58          Blood Culture: Organism --  Gram Stain Blood -- Gram Stain --  Specimen Source .Abscess  Culture-Blood --    Organism --  Gram Stain Blood -- Gram Stain --  Specimen Source .Blood  Culture-Blood --

## 2019-07-15 NOTE — PROGRESS NOTE ADULT - ASSESSMENT
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

## 2019-07-15 NOTE — PROGRESS NOTE ADULT - SUBJECTIVE AND OBJECTIVE BOX
Diagnosis: AML     Protocol/Chemo Regimen: C1 HIDAC on hold     Day: NA     Pt endorsed: no complaint     Review of Systems: Patient denied nausea, vomiting, odynophagia, chest pain, cough, dyspnea, abdominal pain, constipation, diarrhea, rash, fatigue, headache    Pain scale:  none                              Diet: Regular    Allergies    No Known Allergies    Intolerances        ANTIMICROBIALS  cefTRIAXone   IVPB 2000 milliGRAM(s) IV Intermittent every 24 hours  voriconazole 200 milliGRAM(s) Oral every 12 hours      HEME/ONC MEDICATIONS      STANDING MEDICATIONS  chlorhexidine 4% Liquid 1 Application(s) Topical <User Schedule>  docusate sodium 100 milliGRAM(s) Oral three times a day  finasteride 5 milliGRAM(s) Oral daily  metoprolol succinate ER 12.5 milliGRAM(s) Oral daily  nicotine - 21 mG/24Hr(s) Patch 1 patch Transdermal daily  senna 2 Tablet(s) Oral two times a day  sertraline 25 milliGRAM(s) Oral daily  sodium chloride 0.9%. 1000 milliLiter(s) IV Continuous <Continuous>      PRN MEDICATIONS  polyethylene glycol 3350 17 Gram(s) Oral two times a day PRN  sodium chloride 0.9% lock flush 10 milliLiter(s) IV Push every 1 hour PRN        Vital Signs Last 24 Hrs  T(C): 35.8 (15 Jul 2019 06:27), Max: 36.8 (14 Jul 2019 21:09)  T(F): 96.4 (15 Jul 2019 06:27), Max: 98.2 (14 Jul 2019 21:09)  HR: 91 (15 Jul 2019 06:27) (83 - 96)  BP: 98/71 (15 Jul 2019 06:27) (98/71 - 119/79)  BP(mean): --  RR: 18 (15 Jul 2019 06:27) (18 - 18)  SpO2: 94% (15 Jul 2019 06:27) (93% - 98%)    PHYSICAL EXAM  General: adult in NAD  HEENT: clear oropharynx, anicteric sclera  Neck: supple  CV: normal S1/S2 RRR  Lungs: positive air movement b/l ant lungs,clear to auscultation, no wheezes, no rales  Abdomen: soft, + BS,  non-tender non-distended, no hepatosplenomegaly  Ext: no edema; right great toe with dressing D/C/I  Skin: no rash  Neuro: alert and oriented X 3, no focal deficits  Peripheral  Line: C/D/I    RECENT CULTURES:  07-11 @ 22:49  .Abscess  --  --  --    Few Coag Negative Staphylococcus "Susceptibilities not performed"  --  07-11 @ 16:58  .Blood  --  --  --    No growth to date.  --        LABS: Diagnosis: AML     Protocol/Chemo Regimen: C1 HIDAC on hold     Day: NA     Pt endorsed: no complaint, wants to go home.     Review of Systems: Patient denied nausea, vomiting, odynophagia, chest pain, cough, dyspnea, abdominal pain, constipation, diarrhea, rash, fatigue, headache    Pain scale:  none                              Diet: Regular    Allergies    No Known Allergies    Intolerances        ANTIMICROBIALS  cefTRIAXone   IVPB 2000 milliGRAM(s) IV Intermittent every 24 hours  voriconazole 200 milliGRAM(s) Oral every 12 hours      HEME/ONC MEDICATIONS      STANDING MEDICATIONS  chlorhexidine 4% Liquid 1 Application(s) Topical <User Schedule>  docusate sodium 100 milliGRAM(s) Oral three times a day  finasteride 5 milliGRAM(s) Oral daily  metoprolol succinate ER 12.5 milliGRAM(s) Oral daily  nicotine - 21 mG/24Hr(s) Patch 1 patch Transdermal daily  senna 2 Tablet(s) Oral two times a day  sertraline 25 milliGRAM(s) Oral daily  sodium chloride 0.9%. 1000 milliLiter(s) IV Continuous <Continuous>      PRN MEDICATIONS  polyethylene glycol 3350 17 Gram(s) Oral two times a day PRN  sodium chloride 0.9% lock flush 10 milliLiter(s) IV Push every 1 hour PRN        Vital Signs Last 24 Hrs  T(C): 35.8 (15 Jul 2019 06:27), Max: 36.8 (14 Jul 2019 21:09)  T(F): 96.4 (15 Jul 2019 06:27), Max: 98.2 (14 Jul 2019 21:09)  HR: 91 (15 Jul 2019 06:27) (83 - 96)  BP: 98/71 (15 Jul 2019 06:27) (98/71 - 119/79)  BP(mean): --  RR: 18 (15 Jul 2019 06:27) (18 - 18)  SpO2: 94% (15 Jul 2019 06:27) (93% - 98%)    PHYSICAL EXAM  General: adult in NAD  HEENT: clear oropharynx, anicteric sclera  Neck: supple  CV: normal S1/S2 RRR  Lungs: positive air movement b/l ant lungs,clear to auscultation, no wheezes, no rales  Abdomen: soft, + BS,  non-tender non-distended, no hepatosplenomegaly  Ext: no edema; right great toe with dressing D/C/I  Skin: no rash  Neuro: alert and oriented X 3, no focal deficits  Peripheral  Line: C/D/I    RECENT CULTURES:  07-11 @ 22:49  .Abscess  --  --  --    Few Coag Negative Staphylococcus "Susceptibilities not performed"  --  07-11 @ 16:58  .Blood  --  --  --    No growth to date.  --        LABS:

## 2019-07-15 NOTE — PROGRESS NOTE ADULT - PROBLEM SELECTOR PLAN 1
no evidence for MRSA or pseudomonas recommend   ceftriaxone 2 grams IV q-day for 6 weeks so last day 8/21   but ultimate duration based on clinical response and levels of inflammatory markers.   fine for PICC, weekly labs on Mondays faxed to [phone] CBC, BMP, ESR

## 2019-07-15 NOTE — PROGRESS NOTE ADULT - PROBLEM SELECTOR PLAN 6
Pt has been on Finasteride 5 mg po qd x 6 wks for BPH, and he wishes  to resume it  I resumed  it and advised pt to fu with Dr Sorto, urologist from [facility]. Pt has been on Finasteride 5 mg po qd x 6 wks for BPH, and he wishes  to resume it  Patient should follow up with . Name of Dr Sorto, urologist from [facility] provided on DC.

## 2019-07-15 NOTE — PROGRESS NOTE ADULT - ASSESSMENT
Mr. Delong is a 59 year old male with AML previously on anthracycline based chemotherapy with a reported EF in the 20's at this time, who now presents for chemotherapy and evaluation of a toe infection.  More recently, he had an echocardiogram in our office 7/2019 with mild MR, eccentric LVH with moderate LV dysfunction (EF 35-40%), and mild aortic root dilatation.  He also has a history of Factor V Leiden and VTE, and has been on Xarelto.    - No sign of acute ischemia, nor does he have any chest pain. His EKG demonstrates a RBBB, LAD, RVH and non-specific st abn. He reports a cath about 5 years ago at [facility] that was unremarkable and a stress test more recently, though results of this are unavailable.  - No sign of volume overload. He seems to be tolerating Toprol XL 12.5. Can increased to Toprol XL 12.5 mg bid today.  - Mild NAGA on CMP, so hold off on ace-inhibitor, especially in the setting of borderline BP. Ideally, we would start him on lisinopril 2.5 mg po daily, though his creatinine will have to be at baseline.  - MUGA with Dilated left ventricle with generalized hypokinesia and EF 28 %, slightly lower than the echocardiogram.  - Continue Xarelto for history of VTE if platelets remain >50,000  - Watch creatinine and electrolytes. Keep K>4, Mg>2  - Will follow with you.

## 2019-07-15 NOTE — PROGRESS NOTE ADULT - SUBJECTIVE AND OBJECTIVE BOX
infectious diseases progress note:    DI CONTRERAS is a 59y y. o. Male patient    Patient reports: "toe is looking about the same."    ROS:    EYES:  Negative  blurry vision or double vision  GASTROINTESTINAL:  Negative for nausea, vomiting, diarrhea  -otherwise negative except for subjective    Allergies    No Known Allergies    Intolerances        ANTIBIOTICS/RELEVANT:  antimicrobials  cefTRIAXone   IVPB 2000 milliGRAM(s) IV Intermittent every 24 hours  voriconazole 200 milliGRAM(s) Oral every 12 hours    immunologic:    OTHER:  chlorhexidine 4% Liquid 1 Application(s) Topical <User Schedule>  docusate sodium 100 milliGRAM(s) Oral three times a day  finasteride 5 milliGRAM(s) Oral daily  metoprolol succinate ER 12.5 milliGRAM(s) Oral two times a day  nicotine - 21 mG/24Hr(s) Patch 1 patch Transdermal daily  polyethylene glycol 3350 17 Gram(s) Oral two times a day PRN  senna 2 Tablet(s) Oral two times a day  sertraline 25 milliGRAM(s) Oral daily  sodium chloride 0.9% lock flush 10 milliLiter(s) IV Push every 1 hour PRN  sodium chloride 0.9%. 1000 milliLiter(s) IV Continuous <Continuous>      Objective:  Vital Signs Last 24 Hrs  T(C): 35.8 (15 Jul 2019 06:27), Max: 36.8 (14 Jul 2019 21:09)  T(F): 96.4 (15 Jul 2019 06:27), Max: 98.2 (14 Jul 2019 21:09)  HR: 91 (15 Jul 2019 06:27) (83 - 96)  BP: 98/71 (15 Jul 2019 06:27) (98/71 - 119/79)  BP(mean): --  RR: 18 (15 Jul 2019 06:27) (18 - 18)  SpO2: 94% (15 Jul 2019 06:27) (93% - 98%)    T(C): 35.8 (07-15-19 @ 06:27), Max: 36.8 (07-14-19 @ 21:09)  T(C): 35.8 (07-15-19 @ 06:27), Max: 36.8 (07-14-19 @ 21:09)  T(C): 35.8 (07-15-19 @ 06:27), Max: 37 (07-11-19 @ 17:36)    PHYSICAL EXAM:  Constitutional: Well-developed, well nourished  Eyes: PERRLA, EOMI  Ear/Nose/Throat: oropharynx normal	  Neck: no JVD, no lymphadenopathy, supple  Respiratory: no accessory muscle use  Cardiovascular: RRR,   Gastrointestinal: soft, NT  Extremities: no clubbing, no cyanosis, edema absent      LABS:                        8.3    4.5   )-----------( 80       ( 15 Jul 2019 06:57 )             24.6       4.5 07-15 @ 06:57  4.6 07-14 @ 05:38  4.8 07-13 @ 09:58  5.9 07-12 @ 07:06  6.2 07-11 @ 14:39      07-15    141  |  107  |  12  ----------------------------<  98  4.3   |  24  |  1.50<H>    Ca    9.3      15 Jul 2019 06:52  Phos  4.4     07-15  Mg     1.8     07-15    TPro  5.6<L>  /  Alb  3.3  /  TBili  0.2  /  DBili  x   /  AST  16  /  ALT  16  /  AlkPhos  79  07-15      Creatinine, Serum: 1.50 mg/dL (07-15-19 @ 06:52)  Creatinine, Serum: 1.56 mg/dL (07-14-19 @ 05:38)  Creatinine, Serum: 1.50 mg/dL (07-13-19 @ 09:58)  Creatinine, Serum: 1.45 mg/dL (07-12-19 @ 07:05)  Creatinine, Serum: 1.45 mg/dL (07-11-19 @ 14:39)      MICROBIOLOGY:    Culture - Abscess with Gram Stain (07.11.19 @ 22:49)    Specimen Source: .Abscess    Culture Results:   Few Coag Negative Staphylococcus "Susceptibilities not performed"        RADIOLOGY & ADDITIONAL STUDIES:

## 2019-07-15 NOTE — PROGRESS NOTE ADULT - PROBLEM SELECTOR PLAN 1
Admit for cycle 1 consolidation HIDAC which is on hold due to infectious issue  Monitor for cerebellar toxicity when chemo starts   pred forte eye drops with chemo  Monitor labs, replace blood and lytes PRN  Pain control, antiemetics, IV hydration Admit for cycle 1 consolidation HIDAC which is on hold due to infectious issue  Monitor for cerebellar toxicity when chemo starts   pred forte eye drops with chemo  Monitor labs, replace blood and lytes PRN  Pain control, antiemetics, IV hydration  Discharge planning Tuesday 7/16

## 2019-07-16 ENCOUNTER — TRANSCRIPTION ENCOUNTER (OUTPATIENT)
Age: 59
End: 2019-07-16

## 2019-07-16 VITALS
HEART RATE: 84 BPM | OXYGEN SATURATION: 98 % | RESPIRATION RATE: 18 BRPM | DIASTOLIC BLOOD PRESSURE: 85 MMHG | TEMPERATURE: 97 F | SYSTOLIC BLOOD PRESSURE: 129 MMHG

## 2019-07-16 PROBLEM — D68.51 ACTIVATED PROTEIN C RESISTANCE: Chronic | Status: ACTIVE | Noted: 2019-07-11

## 2019-07-16 PROBLEM — Z86.79 PERSONAL HISTORY OF OTHER DISEASES OF THE CIRCULATORY SYSTEM: Chronic | Status: ACTIVE | Noted: 2019-07-11

## 2019-07-16 LAB
ALBUMIN SERPL ELPH-MCNC: 3.3 G/DL — SIGNIFICANT CHANGE UP (ref 3.3–5)
ALP SERPL-CCNC: 77 U/L — SIGNIFICANT CHANGE UP (ref 40–120)
ALT FLD-CCNC: 14 U/L — SIGNIFICANT CHANGE UP (ref 10–45)
ANION GAP SERPL CALC-SCNC: 13 MMOL/L — SIGNIFICANT CHANGE UP (ref 5–17)
AST SERPL-CCNC: 18 U/L — SIGNIFICANT CHANGE UP (ref 10–40)
BASOPHILS # BLD AUTO: 0 K/UL — SIGNIFICANT CHANGE UP (ref 0–0.2)
BASOPHILS NFR BLD AUTO: 0.2 % — SIGNIFICANT CHANGE UP (ref 0–2)
BILIRUB SERPL-MCNC: 0.2 MG/DL — SIGNIFICANT CHANGE UP (ref 0.2–1.2)
BUN SERPL-MCNC: 12 MG/DL — SIGNIFICANT CHANGE UP (ref 7–23)
CALCIUM SERPL-MCNC: 9.1 MG/DL — SIGNIFICANT CHANGE UP (ref 8.4–10.5)
CHLORIDE SERPL-SCNC: 104 MMOL/L — SIGNIFICANT CHANGE UP (ref 96–108)
CO2 SERPL-SCNC: 23 MMOL/L — SIGNIFICANT CHANGE UP (ref 22–31)
CREAT SERPL-MCNC: 1.41 MG/DL — HIGH (ref 0.5–1.3)
CULTURE RESULTS: SIGNIFICANT CHANGE UP
EOSINOPHIL # BLD AUTO: 0.1 K/UL — SIGNIFICANT CHANGE UP (ref 0–0.5)
EOSINOPHIL NFR BLD AUTO: 2.3 % — SIGNIFICANT CHANGE UP (ref 0–6)
GLUCOSE SERPL-MCNC: 112 MG/DL — HIGH (ref 70–99)
HCT VFR BLD CALC: 25 % — LOW (ref 39–50)
HGB BLD-MCNC: 8.6 G/DL — LOW (ref 13–17)
LYMPHOCYTES # BLD AUTO: 2.1 K/UL — SIGNIFICANT CHANGE UP (ref 1–3.3)
LYMPHOCYTES # BLD AUTO: 43.9 % — SIGNIFICANT CHANGE UP (ref 13–44)
MAGNESIUM SERPL-MCNC: 1.8 MG/DL — SIGNIFICANT CHANGE UP (ref 1.6–2.6)
MCHC RBC-ENTMCNC: 33.3 PG — SIGNIFICANT CHANGE UP (ref 27–34)
MCHC RBC-ENTMCNC: 34.2 GM/DL — SIGNIFICANT CHANGE UP (ref 32–36)
MCV RBC AUTO: 97.4 FL — SIGNIFICANT CHANGE UP (ref 80–100)
MONOCYTES # BLD AUTO: 0.4 K/UL — SIGNIFICANT CHANGE UP (ref 0–0.9)
MONOCYTES NFR BLD AUTO: 9.4 % — SIGNIFICANT CHANGE UP (ref 2–14)
NEUTROPHILS # BLD AUTO: 2.1 K/UL — SIGNIFICANT CHANGE UP (ref 1.8–7.4)
NEUTROPHILS NFR BLD AUTO: 44.3 % — SIGNIFICANT CHANGE UP (ref 43–77)
PHOSPHATE SERPL-MCNC: 4.5 MG/DL — SIGNIFICANT CHANGE UP (ref 2.5–4.5)
PLATELET # BLD AUTO: 84 K/UL — LOW (ref 150–400)
POTASSIUM SERPL-MCNC: 4.2 MMOL/L — SIGNIFICANT CHANGE UP (ref 3.5–5.3)
POTASSIUM SERPL-SCNC: 4.2 MMOL/L — SIGNIFICANT CHANGE UP (ref 3.5–5.3)
PROT SERPL-MCNC: 5.6 G/DL — LOW (ref 6–8.3)
RBC # BLD: 2.57 M/UL — LOW (ref 4.2–5.8)
RBC # FLD: 16.9 % — HIGH (ref 10.3–14.5)
SODIUM SERPL-SCNC: 140 MMOL/L — SIGNIFICANT CHANGE UP (ref 135–145)
SPECIMEN SOURCE: SIGNIFICANT CHANGE UP
VORICONAZOLE SERPL-MCNC: 1.8 MCG/ML — SIGNIFICANT CHANGE UP (ref 1–5.5)
WBC # BLD: 4.8 K/UL — SIGNIFICANT CHANGE UP (ref 3.8–10.5)
WBC # FLD AUTO: 4.8 K/UL — SIGNIFICANT CHANGE UP (ref 3.8–10.5)

## 2019-07-16 PROCEDURE — 99238 HOSP IP/OBS DSCHRG MGMT 30/<: CPT

## 2019-07-16 PROCEDURE — 99232 SBSQ HOSP IP/OBS MODERATE 35: CPT

## 2019-07-16 RX ADMIN — CEFTRIAXONE 100 MILLIGRAM(S): 500 INJECTION, POWDER, FOR SOLUTION INTRAMUSCULAR; INTRAVENOUS at 14:27

## 2019-07-16 RX ADMIN — VORICONAZOLE 200 MILLIGRAM(S): 10 INJECTION, POWDER, LYOPHILIZED, FOR SOLUTION INTRAVENOUS at 06:36

## 2019-07-16 RX ADMIN — SERTRALINE 50 MILLIGRAM(S): 25 TABLET, FILM COATED ORAL at 11:09

## 2019-07-16 RX ADMIN — Medication 1 PATCH: at 07:50

## 2019-07-16 RX ADMIN — Medication 12.5 MILLIGRAM(S): at 06:36

## 2019-07-16 RX ADMIN — SODIUM CHLORIDE 100 MILLILITER(S): 9 INJECTION INTRAMUSCULAR; INTRAVENOUS; SUBCUTANEOUS at 12:12

## 2019-07-16 RX ADMIN — FINASTERIDE 5 MILLIGRAM(S): 5 TABLET, FILM COATED ORAL at 12:13

## 2019-07-16 NOTE — PROGRESS NOTE ADULT - SUBJECTIVE AND OBJECTIVE BOX
infectious diseases progress note:    DI CONTRERAS is a 59y y. o. Male patient    Patient reports: "I am still waiting to talk to my cancer doctor about timing of getting the toe amputated or if I can give it time to improve"     ROS:    EYES:  Negative  blurry vision or double vision  GASTROINTESTINAL:  Negative for nausea, vomiting, diarrhea  -otherwise negative except for subjective    Allergies    No Known Allergies    Intolerances        ANTIBIOTICS/RELEVANT:  antimicrobials  cefTRIAXone   IVPB 2000 milliGRAM(s) IV Intermittent every 24 hours  voriconazole 200 milliGRAM(s) Oral every 12 hours    immunologic:    OTHER:  chlorhexidine 4% Liquid 1 Application(s) Topical <User Schedule>  docusate sodium 100 milliGRAM(s) Oral three times a day  finasteride 5 milliGRAM(s) Oral daily  metoprolol succinate ER 12.5 milliGRAM(s) Oral two times a day  nicotine - 21 mG/24Hr(s) Patch 1 patch Transdermal daily  polyethylene glycol 3350 17 Gram(s) Oral two times a day PRN  rivaroxaban 20 milliGRAM(s) Oral with dinner  senna 2 Tablet(s) Oral two times a day  sertraline 50 milliGRAM(s) Oral daily  sodium chloride 0.9% lock flush 10 milliLiter(s) IV Push every 1 hour PRN  sodium chloride 0.9%. 1000 milliLiter(s) IV Continuous <Continuous>      Objective:  Vital Signs Last 24 Hrs  T(C): 36.1 (16 Jul 2019 09:39), Max: 36.7 (15 Jul 2019 17:48)  T(F): 97 (16 Jul 2019 09:39), Max: 98.1 (15 Jul 2019 17:48)  HR: 77 (16 Jul 2019 09:39) (77 - 109)  BP: 109/69 (16 Jul 2019 09:39) (101/69 - 128/80)  BP(mean): --  RR: 18 (16 Jul 2019 09:39) (18 - 18)  SpO2: 95% (16 Jul 2019 09:39) (95% - 97%)    T(C): 36.1 (07-16-19 @ 09:39), Max: 36.8 (07-14-19 @ 21:09)  T(C): 36.1 (07-16-19 @ 09:39), Max: 36.8 (07-14-19 @ 21:09)  T(C): 36.1 (07-16-19 @ 09:39), Max: 36.8 (07-14-19 @ 21:09)    PHYSICAL EXAM:  Constitutional: Well-developed, well nourished  Eyes: PERRLA, EOMI  Ear/Nose/Throat: oropharynx normal	  Neck: no JVD, no lymphadenopathy, supple  Respiratory: no accessory muscle use  Cardiovascular: RRR,   Gastrointestinal: soft, NT  Extremities: no clubbing, no cyanosis, edema absent, toe wrapped      LABS:                        8.6    4.8   )-----------( 84       ( 16 Jul 2019 07:25 )             25.0       4.8 07-16 @ 07:25  4.5 07-15 @ 06:57  4.6 07-14 @ 05:38  4.8 07-13 @ 09:58  5.9 07-12 @ 07:06  6.2 07-11 @ 14:39      07-16    140  |  104  |  12  ----------------------------<  112<H>  4.2   |  23  |  1.41<H>    Ca    9.1      16 Jul 2019 07:25  Phos  4.5     07-16  Mg     1.8     07-16    TPro  5.6<L>  /  Alb  3.3  /  TBili  0.2  /  DBili  x   /  AST  18  /  ALT  14  /  AlkPhos  77  07-16      Creatinine, Serum: 1.41 mg/dL (07-16-19 @ 07:25)  Creatinine, Serum: 1.50 mg/dL (07-15-19 @ 06:52)  Creatinine, Serum: 1.56 mg/dL (07-14-19 @ 05:38)  Creatinine, Serum: 1.50 mg/dL (07-13-19 @ 09:58)  Creatinine, Serum: 1.45 mg/dL (07-12-19 @ 07:05)  Creatinine, Serum: 1.45 mg/dL (07-11-19 @ 14:39)                MICROBIOLOGY:              RADIOLOGY & ADDITIONAL STUDIES:

## 2019-07-16 NOTE — PROGRESS NOTE ADULT - PROVIDER SPECIALTY LIST ADULT
Cardiology
Heme/Onc
Infectious Disease
Podiatry
Podiatry
Heme/Onc
Infectious Disease
Heme/Onc
Infectious Disease

## 2019-07-16 NOTE — PROGRESS NOTE ADULT - PROBLEM SELECTOR PLAN 2
Patient is not neutropenic, afebrile   If febrile pan cultures and CXR   Continue Ceftriaxone for right great toe Osteo  FU cultures: blood and wound  7/14 Per podiatry she said pt can weight bear as tolerated  PT wound care to see pt, requested   Local wound care  Ongoing discussion with Dr. Coelho and ID as whether to treat infection for one week to avoid amputation or to move forward with it. In either case, per Podiatry can be done OP.

## 2019-07-16 NOTE — PROGRESS NOTE ADULT - PROBLEM SELECTOR PLAN 1
no evidence for MRSA or pseudomonas recommend   ceftriaxone 2 grams IV q-day for 6 weeks so last day 8/21   but ultimate duration based on clinical response and levels of inflammatory markers.   fine for PICC, weekly labs on Mondays faxed to [phone] CBC, BMP, ESR

## 2019-07-16 NOTE — PROGRESS NOTE ADULT - PROBLEM SELECTOR PROBLEM 6
BPH (benign prostatic hyperplasia)
BPH (benign prostatic hyperplasia)
AML (acute myeloid leukemia)
BPH (benign prostatic hyperplasia)

## 2019-07-16 NOTE — PROGRESS NOTE ADULT - PROBLEM SELECTOR PLAN 6
Pt has been on Finasteride 5 mg po qd x 6 wks for BPH, and he wishes  to resume it  Patient should follow up with . Name of Dr Sorto, urologist from [facility] provided on DC.

## 2019-07-16 NOTE — DISCHARGE NOTE NURSING/CASE MANAGEMENT/SOCIAL WORK - NSDCFUADDAPPT_GEN_ALL_CORE_FT
You must follow up with Dr. Lyn from Cardiology [phone]. Call to make an appointment for one week from now    You must call and make an appointment for outpatient follow up with Infectious Disease. Please call the office for an appointment. Dr. Gordon Ford     You have an appointment with Dr. Coelho    You have need to call [facility] to follow up with issues related to your enlarged prostate. [phone]

## 2019-07-16 NOTE — PROGRESS NOTE ADULT - ASSESSMENT
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  Recommend continued current abx until either full resection with clear margins is accomplished or duration as recommended to try to treat osteomyelitis

## 2019-07-16 NOTE — PROGRESS NOTE ADULT - REASON FOR ADMISSION
Cycle 1 HIDAC

## 2019-07-16 NOTE — PROGRESS NOTE ADULT - SUBJECTIVE AND OBJECTIVE BOX
Diagnosis: AML     Protocol/Chemo Regimen: C1 HIDAC on hold     Day: NA     Pt endorsed: no complaint,   Review of Systems: Patient denied nausea, vomiting, odynophagia, chest pain, cough, dyspnea, abdominal pain, constipation, diarrhea, rash, fatigue, headache    Pain scale:  none                              Diet: Regular    Allergies    No Known Allergies    Intolerances        ANTIMICROBIALS  cefTRIAXone   IVPB 2000 milliGRAM(s) IV Intermittent every 24 hours  voriconazole 200 milliGRAM(s) Oral every 12 hours      HEME/ONC MEDICATIONS  rivaroxaban 20 milliGRAM(s) Oral with dinner      STANDING MEDICATIONS  chlorhexidine 4% Liquid 1 Application(s) Topical <User Schedule>  docusate sodium 100 milliGRAM(s) Oral three times a day  finasteride 5 milliGRAM(s) Oral daily  metoprolol succinate ER 12.5 milliGRAM(s) Oral two times a day  nicotine - 21 mG/24Hr(s) Patch 1 patch Transdermal daily  senna 2 Tablet(s) Oral two times a day  sertraline 50 milliGRAM(s) Oral daily  sodium chloride 0.9%. 1000 milliLiter(s) IV Continuous <Continuous>      PRN MEDICATIONS  polyethylene glycol 3350 17 Gram(s) Oral two times a day PRN  sodium chloride 0.9% lock flush 10 milliLiter(s) IV Push every 1 hour PRN        Vital Signs Last 24 Hrs  T(C): 36 (16 Jul 2019 05:51), Max: 36.7 (15 Jul 2019 17:48)  T(F): 96.8 (16 Jul 2019 05:51), Max: 98.1 (15 Jul 2019 17:48)  HR: 90 (16 Jul 2019 05:51) (83 - 109)  BP: 101/69 (16 Jul 2019 05:51) (101/69 - 128/80)  BP(mean): --  RR: 18 (16 Jul 2019 05:51) (18 - 18)  SpO2: 95% (16 Jul 2019 05:51) (95% - 99%)    PHYSICAL EXAM  General: NAD  HEENT: PERRLA, EOMOI, clear oropharynx, anicteric sclera, pink conjunctiva  Neck: supple  CV: (+) S1/S2 RRR  Lungs: clear to auscultation, no wheezes or rales  Abdomen: soft, non-tender, non-distended (+) BS  Ext: no clubbing, cyanosis or edema  Skin: no rashes and no petechiae  Neuro: alert and oriented X 3, no focal deficits  Central Line:     RECENT CULTURES:  07-11 @ 22:49  .Abscess  Few Coag Negative Staphylococcus "Susceptibilities not performed"  --  07-11 @ 16:58  .Blood    No growth to date. Diagnosis: AML     Protocol/Chemo Regimen: C1 HIDAC on hold     Day: NA     Pt endorsed: no complaint, wants to know what the final plan is  Review of Systems: Patient denied nausea, vomiting, odynophagia, chest pain, cough, dyspnea, abdominal pain, constipation, diarrhea, rash, fatigue, headache    Pain scale:  none                              Diet: Regular    Allergies    No Known Allergies    Intolerances        ANTIMICROBIALS  cefTRIAXone   IVPB 2000 milliGRAM(s) IV Intermittent every 24 hours  voriconazole 200 milliGRAM(s) Oral every 12 hours      HEME/ONC MEDICATIONS  rivaroxaban 20 milliGRAM(s) Oral with dinner      STANDING MEDICATIONS  chlorhexidine 4% Liquid 1 Application(s) Topical <User Schedule>  docusate sodium 100 milliGRAM(s) Oral three times a day  finasteride 5 milliGRAM(s) Oral daily  metoprolol succinate ER 12.5 milliGRAM(s) Oral two times a day  nicotine - 21 mG/24Hr(s) Patch 1 patch Transdermal daily  senna 2 Tablet(s) Oral two times a day  sertraline 50 milliGRAM(s) Oral daily  sodium chloride 0.9%. 1000 milliLiter(s) IV Continuous <Continuous>      PRN MEDICATIONS  polyethylene glycol 3350 17 Gram(s) Oral two times a day PRN  sodium chloride 0.9% lock flush 10 milliLiter(s) IV Push every 1 hour PRN        Vital Signs Last 24 Hrs  T(C): 36 (16 Jul 2019 05:51), Max: 36.7 (15 Jul 2019 17:48)  T(F): 96.8 (16 Jul 2019 05:51), Max: 98.1 (15 Jul 2019 17:48)  HR: 90 (16 Jul 2019 05:51) (83 - 109)  BP: 101/69 (16 Jul 2019 05:51) (101/69 - 128/80)  BP(mean): --  RR: 18 (16 Jul 2019 05:51) (18 - 18)  SpO2: 95% (16 Jul 2019 05:51) (95% - 99%)    PHYSICAL EXAM  General: NAD  HEENT: PERRLA, EOMOI, clear oropharynx, anicteric sclera, pink conjunctiva  Neck: supple  CV: (+) S1/S2 RRR  Lungs: clear to auscultation, no wheezes or rales  Abdomen: soft, non-tender, non-distended (+) BS  Ext: no clubbing, cyanosis or edema  Skin: no rashes and no petechiae  Neuro: alert and oriented X 3, no focal deficits  Central Line: PICC C/D/I    RECENT CULTURES:  07-11 @ 22:49  .Abscess  Few Coag Negative Staphylococcus "Susceptibilities not performed"  --  07-11 @ 16:58  .Blood    No growth to date.      LABS:    Blood Cultures:                           8.6    4.8   )-----------( 84       ( 16 Jul 2019 07:25 )             25.0         Mean Cell Volume : 97.4 fl  Mean Cell Hemoglobin : 33.3 pg  Mean Cell Hemoglobin Concentration : 34.2 gm/dL  Auto Neutrophil # : 2.1 K/uL  Auto Lymphocyte # : 2.1 K/uL  Auto Monocyte # : 0.4 K/uL  Auto Eosinophil # : 0.1 K/uL  Auto Basophil # : 0.0 K/uL  Auto Neutrophil % : 44.3 %  Auto Lymphocyte % : 43.9 %  Auto Monocyte % : 9.4 %  Auto Eosinophil % : 2.3 %  Auto Basophil % : 0.2 %      07-16    140  |  104  |  12  ----------------------------<  112<H>  4.2   |  23  |  1.41<H>    Ca    9.1      16 Jul 2019 07:25  Phos  4.5     07-16  Mg     1.8     07-16    TPro  5.6<L>  /  Alb  3.3  /  TBili  0.2  /  DBili  x   /  AST  18  /  ALT  14  /  AlkPhos  77  07-16      Mg 1.8  Phos 4.5

## 2019-07-16 NOTE — DISCHARGE NOTE NURSING/CASE MANAGEMENT/SOCIAL WORK - NSDCDPATPORTLINK_GEN_ALL_CORE
You can access the [facility] Portal, offered by [facility], by registering with the following website: [email or web address]

## 2019-07-16 NOTE — PROGRESS NOTE ADULT - ATTENDING COMMENTS
60 yo M with AML s/p induction and re-induction with 7+3/5+2 at [place] in CR1, hx factor V Leiden and DVT on anticoagulation, EtOH abuse, admitted on 7/11 for C1 HIDAC However, R 1st toe with infection -MRI R toe done 7/11 --? osteomyelitis. Requires course of IV antibiotics and possibly amputation. HiDAC to be held due to this.    -MRI R toe 7/11 showed signal alteration of the distal phalanx of the great toe. Given reported physical exam findings of overlying wound extending to bone, this is suspicious osteomyelitis. There may be superimposed fracture of the tuft of the distal phalanx. X-ray correlation is recommended.  ID on board -on IV Ceftriazone and po Vori, will d/w ID given osteomyelelitis. F/U BCx's from 7/11, wound Cx 7/11  -PICC placed  -monitor counts. Hgb and platelets drifting down.   Resume Xarelto. 58 yo M with AML s/p induction and re-induction with 7+3/5+2 at [place] in CR1, hx factor V Leiden and DVT on anticoagulation, EtOH abuse, admitted on 7/11 for C1 HIDAC However, R 1st toe with infection -MRI R toe done 7/11 --? osteomyelitis. Requires course of IV antibiotics and possibly amputation. HiDAC to be held due to this.    -MRI R toe 7/11 showed signal alteration of the distal phalanx of the great toe. Given reported physical exam findings of overlying wound extending to bone, this is suspicious osteomyelitis. There may be superimposed fracture of the tuft of the distal phalanx. X-ray correlation is recommended.  ID on board -on IV Ceftriaxone and po Vori. F/U  -PICC placed  -monitor counts. Hgb and platelets drifting down.   Will D/C home today. Resume Xarelto. IV antibiotics at home arranged plus oral Vori. F?U at [facility], ID, Podiatry.

## 2019-07-16 NOTE — PROGRESS NOTE ADULT - PROBLEM SELECTOR PLAN 1
Admit for cycle 1 consolidation HIDAC which is on hold due to infectious issue  Monitor for cerebellar toxicity when chemo starts   pred forte eye drops with chemo  Monitor labs, replace blood and lytes PRN  Pain control, antiemetics, IV hydration  Discharge planning Tuesday 7/16

## 2019-07-16 NOTE — PROGRESS NOTE ADULT - SUBJECTIVE AND OBJECTIVE BOX
[facility]    Ede Zamudio, Riki, Edgar, Gem, Jose, Tabitha      [phone]    CHIEF COMPLAINT: Patient is a 59y old  Male who presents with a chief complaint of Cycle 1 HIDAC (16 Jul 2019 10:16)      Follow Up: cmy, chemo/toe infection    Interim history: The patient reports no new symptoms.  Denies chest discomfort and shortness of breath.  No abdominal pain.  No new neurologic symptoms.      MEDICATIONS  (STANDING):  cefTRIAXone   IVPB 2000 milliGRAM(s) IV Intermittent every 24 hours  chlorhexidine 4% Liquid 1 Application(s) Topical <User Schedule>  docusate sodium 100 milliGRAM(s) Oral three times a day  finasteride 5 milliGRAM(s) Oral daily  metoprolol succinate ER 12.5 milliGRAM(s) Oral two times a day  nicotine - 21 mG/24Hr(s) Patch 1 patch Transdermal daily  rivaroxaban 20 milliGRAM(s) Oral with dinner  senna 2 Tablet(s) Oral two times a day  sertraline 50 milliGRAM(s) Oral daily  sodium chloride 0.9%. 1000 milliLiter(s) (100 mL/Hr) IV Continuous <Continuous>  voriconazole 200 milliGRAM(s) Oral every 12 hours    MEDICATIONS  (PRN):  polyethylene glycol 3350 17 Gram(s) Oral two times a day PRN Constipation  sodium chloride 0.9% lock flush 10 milliLiter(s) IV Push every 1 hour PRN Pre/post blood products, medications, blood draw, and to maintain line patency      REVIEW OF SYSTEMS:  eye, ent, GI, , allergic, dermatologic, musculoskeletal and neurologic are negative except as described above    Vital Signs Last 24 Hrs  T(C): 36.1 (16 Jul 2019 09:39), Max: 36.7 (15 Jul 2019 17:48)  T(F): 97 (16 Jul 2019 09:39), Max: 98.1 (15 Jul 2019 17:48)  HR: 77 (16 Jul 2019 09:39) (77 - 109)  BP: 109/69 (16 Jul 2019 09:39) (101/69 - 128/80)  BP(mean): --  RR: 18 (16 Jul 2019 09:39) (18 - 18)  SpO2: 95% (16 Jul 2019 09:39) (95% - 97%)    I&O's Summary    15 Jul 2019 07:01  -  16 Jul 2019 07:00  --------------------------------------------------------  IN: 2809 mL / OUT: 2100 mL / NET: 709 mL    16 Jul 2019 07:01  -  16 Jul 2019 12:37  --------------------------------------------------------  IN: 0 mL / OUT: 1300 mL / NET: -1300 mL        Telemetry past 24h:    PHYSICAL EXAM:    Constitutional: well-nourished, well-developed, NAD   HEENT:  MMM, sclerae anicteric, conjunctivae clear, no oral cyanosis.  Pulmonary: Non-labored, breath sounds are clear bilaterally, No wheezing, rales or rhonchi  Cardiovascular: Regular, S1 and S2.  No murmur.  No rubs, gallops or clicks  Gastrointestinal: Bowel Sounds present, soft, nontender.   Lymph: No peripheral edema. right foot wrapped cdi  Neurological: Alert, no focal deficits  Skin: No rashes.  Psych:  Mood & affect appropriate    LABS: All Labs Reviewed:                        8.6    4.8   )-----------( 84       ( 16 Jul 2019 07:25 )             25.0                         8.3    4.5   )-----------( 80       ( 15 Jul 2019 06:57 )             24.6                         9.0    4.6   )-----------( 92       ( 14 Jul 2019 05:38 )             26.3     16 Jul 2019 07:25    140    |  104    |  12     ----------------------------<  112    4.2     |  23     |  1.41   15 Jul 2019 06:52    141    |  107    |  12     ----------------------------<  98     4.3     |  24     |  1.50   14 Jul 2019 05:38    140    |  105    |  14     ----------------------------<  106    4.5     |  23     |  1.56     Ca    9.1        16 Jul 2019 07:25  Ca    9.3        15 Jul 2019 06:52  Ca    9.2        14 Jul 2019 05:38  Phos  4.5       16 Jul 2019 07:25  Phos  4.4       15 Jul 2019 06:52  Phos  4.4       14 Jul 2019 05:38  Mg     1.8       16 Jul 2019 07:25  Mg     1.8       15 Jul 2019 06:52  Mg     1.8       14 Jul 2019 05:38    TPro  5.6    /  Alb  3.3    /  TBili  0.2    /  DBili  x      /  AST  18     /  ALT  14     /  AlkPhos  77     16 Jul 2019 07:25  TPro  5.6    /  Alb  3.3    /  TBili  0.2    /  DBili  x      /  AST  16     /  ALT  16     /  AlkPhos  79     15 Jul 2019 06:52  TPro  5.8    /  Alb  3.6    /  TBili  0.2    /  DBili  x      /  AST  24     /  ALT  17     /  AlkPhos  85     14 Jul 2019 05:38          Blood Culture: Organism --  Gram Stain Blood -- Gram Stain --  Specimen Source .Abscess  Culture-Blood --    Organism --  Gram Stain Blood -- Gram Stain --  Specimen Source .Blood  Culture-Blood --            RADIOLOGY:    EKG:    Echo:

## 2019-07-16 NOTE — PROGRESS NOTE ADULT - ASSESSMENT
59 year old male with AML previously on anthracycline based chemotherapy with a reported EF in the 20's at this time, who now presents for chemotherapy and evaluation of a toe infection.  More recently, he had an echocardiogram in our office 7/2019 with mild MR, eccentric LVH with moderate LV dysfunction (EF 35-40%), and mild aortic root dilatation.  He also has a history of Factor V Leiden and VTE, and has been on Xarelto.    -there is no evidence of acute ischemia.  -there is no evidence of significant arrhythmia.  -there is no evidence for meaningful  volume overload.     -His EKG demonstrates a RBBB, LAD, RVH and non-specific st abn. He reports a cath about 5 years ago at [facility] that was unremarkable and a stress test more recently, though results of this are unavailable.  -He seems to be tolerating Toprol XL 12.5 bid, would continue.  - Mild NAGA improving. can cont to hold off on ace, but would aim to start this when renal fxn is more cooperative   - MUGA with Dilated left ventricle with generalized hypokinesia and EF 28 %, slightly lower than the echocardiogram.  - Continue Xarelto for history of VTE if platelets remain >50,000  - Watch creatinine and electrolytes. Keep K>4, Mg>2  - Will follow with you.

## 2019-07-16 NOTE — PROGRESS NOTE ADULT - PROBLEM SELECTOR PLAN 7
Patient with h/o DVT/PE and  factor V leiden  DC Xarelto when PLT <50 K  Increased bowel regimens for constipation

## 2019-07-17 ENCOUNTER — INBOUND DOCUMENT (OUTPATIENT)
Age: 59
End: 2019-07-17

## 2019-07-19 LAB — CHROM ANALY INTERPHASE BLD FISH-IMP: SIGNIFICANT CHANGE UP

## 2019-07-22 ENCOUNTER — RESULT REVIEW (OUTPATIENT)
Age: 59
End: 2019-07-22

## 2019-07-22 ENCOUNTER — APPOINTMENT (OUTPATIENT)
Dept: HEMATOLOGY ONCOLOGY | Facility: CLINIC | Age: 59
End: 2019-07-22
Payer: COMMERCIAL

## 2019-07-22 VITALS
RESPIRATION RATE: 16 BRPM | HEART RATE: 75 BPM | OXYGEN SATURATION: 99 % | WEIGHT: 176.39 LBS | TEMPERATURE: 98.1 F | DIASTOLIC BLOOD PRESSURE: 75 MMHG | BODY MASS INDEX: 23.92 KG/M2 | SYSTOLIC BLOOD PRESSURE: 123 MMHG

## 2019-07-22 LAB
BASOPHILS # BLD AUTO: 0 K/UL — SIGNIFICANT CHANGE UP (ref 0–0.2)
BASOPHILS NFR BLD AUTO: 0.7 % — SIGNIFICANT CHANGE UP (ref 0–2)
EOSINOPHIL # BLD AUTO: 0.1 K/UL — SIGNIFICANT CHANGE UP (ref 0–0.5)
EOSINOPHIL NFR BLD AUTO: 1.3 % — SIGNIFICANT CHANGE UP (ref 0–6)
HCT VFR BLD CALC: 28.9 % — LOW (ref 39–50)
HGB BLD-MCNC: 9.4 G/DL — LOW (ref 13–17)
LYMPHOCYTES # BLD AUTO: 2.2 K/UL — SIGNIFICANT CHANGE UP (ref 1–3.3)
LYMPHOCYTES # BLD AUTO: 34.4 % — SIGNIFICANT CHANGE UP (ref 13–44)
MCHC RBC-ENTMCNC: 32.4 PG — SIGNIFICANT CHANGE UP (ref 27–34)
MCHC RBC-ENTMCNC: 32.5 G/DL — SIGNIFICANT CHANGE UP (ref 32–36)
MCV RBC AUTO: 99.7 FL — SIGNIFICANT CHANGE UP (ref 80–100)
MONOCYTES # BLD AUTO: 0.7 K/UL — SIGNIFICANT CHANGE UP (ref 0–0.9)
MONOCYTES NFR BLD AUTO: 10.4 % — SIGNIFICANT CHANGE UP (ref 2–14)
NEUTROPHILS # BLD AUTO: 3.4 K/UL — SIGNIFICANT CHANGE UP (ref 1.8–7.4)
NEUTROPHILS NFR BLD AUTO: 53.3 % — SIGNIFICANT CHANGE UP (ref 43–77)
PLATELET # BLD AUTO: 80 K/UL — LOW (ref 150–400)
RBC # BLD: 2.89 M/UL — LOW (ref 4.2–5.8)
RBC # FLD: 16.2 % — HIGH (ref 10.3–14.5)
WBC # BLD: 6.4 K/UL — SIGNIFICANT CHANGE UP (ref 3.8–10.5)
WBC # FLD AUTO: 6.4 K/UL — SIGNIFICANT CHANGE UP (ref 3.8–10.5)

## 2019-07-22 PROCEDURE — XXXXX: CPT

## 2019-07-25 ENCOUNTER — INPATIENT (INPATIENT)
Facility: HOSPITAL | Age: 59
LOS: 8 days | Discharge: HOME | DRG: 504 | End: 2019-08-03
Attending: INTERNAL MEDICINE | Admitting: PODIATRIST
Payer: COMMERCIAL

## 2019-07-25 ENCOUNTER — TRANSCRIPTION ENCOUNTER (OUTPATIENT)
Age: 59
End: 2019-07-25

## 2019-07-25 VITALS
HEART RATE: 88 BPM | WEIGHT: 179.24 LBS | RESPIRATION RATE: 18 BRPM | HEIGHT: 72.5 IN | SYSTOLIC BLOOD PRESSURE: 122 MMHG | OXYGEN SATURATION: 97 % | TEMPERATURE: 98 F | DIASTOLIC BLOOD PRESSURE: 71 MMHG

## 2019-07-25 DIAGNOSIS — I50.20 UNSPECIFIED SYSTOLIC (CONGESTIVE) HEART FAILURE: ICD-10-CM

## 2019-07-25 DIAGNOSIS — M86.172 OTHER ACUTE OSTEOMYELITIS, LEFT ANKLE AND FOOT: ICD-10-CM

## 2019-07-25 LAB
ALBUMIN SERPL ELPH-MCNC: 3.6 G/DL — SIGNIFICANT CHANGE UP (ref 3.3–5)
ALP SERPL-CCNC: 67 U/L — SIGNIFICANT CHANGE UP (ref 40–120)
ALT FLD-CCNC: 12 U/L — SIGNIFICANT CHANGE UP (ref 10–45)
ANION GAP SERPL CALC-SCNC: 14 MMOL/L — SIGNIFICANT CHANGE UP (ref 5–17)
APTT BLD: 27.2 SEC — LOW (ref 27.5–36.3)
AST SERPL-CCNC: 19 U/L — SIGNIFICANT CHANGE UP (ref 10–40)
BILIRUB SERPL-MCNC: 0.2 MG/DL — SIGNIFICANT CHANGE UP (ref 0.2–1.2)
BLD GP AB SCN SERPL QL: NEGATIVE — SIGNIFICANT CHANGE UP
BUN SERPL-MCNC: 12 MG/DL — SIGNIFICANT CHANGE UP (ref 7–23)
CALCIUM SERPL-MCNC: 9.3 MG/DL — SIGNIFICANT CHANGE UP (ref 8.4–10.5)
CHLORIDE SERPL-SCNC: 101 MMOL/L — SIGNIFICANT CHANGE UP (ref 96–108)
CO2 SERPL-SCNC: 24 MMOL/L — SIGNIFICANT CHANGE UP (ref 22–31)
CREAT SERPL-MCNC: 1.31 MG/DL — HIGH (ref 0.5–1.3)
GLUCOSE SERPL-MCNC: 124 MG/DL — HIGH (ref 70–99)
HCT VFR BLD CALC: 25.2 % — LOW (ref 39–50)
HGB BLD-MCNC: 8.6 G/DL — LOW (ref 13–17)
INR BLD: 1.14 RATIO — SIGNIFICANT CHANGE UP (ref 0.88–1.16)
LYMPHOCYTES # BLD AUTO: 44 % — SIGNIFICANT CHANGE UP (ref 13–44)
LYMPHOCYTES # BLD AUTO: SIGNIFICANT CHANGE UP (ref 1–3.3)
MAGNESIUM SERPL-MCNC: 1.8 MG/DL — SIGNIFICANT CHANGE UP (ref 1.6–2.6)
MCHC RBC-ENTMCNC: 33.9 PG — SIGNIFICANT CHANGE UP (ref 27–34)
MCHC RBC-ENTMCNC: 34.1 GM/DL — SIGNIFICANT CHANGE UP (ref 32–36)
MCV RBC AUTO: 99.3 FL — SIGNIFICANT CHANGE UP (ref 80–100)
MONOCYTES # BLD AUTO: SIGNIFICANT CHANGE UP (ref 0–0.9)
MONOCYTES NFR BLD AUTO: 2 % — SIGNIFICANT CHANGE UP (ref 2–14)
NEUTROPHILS # BLD AUTO: SIGNIFICANT CHANGE UP (ref 1.8–7.4)
NEUTROPHILS NFR BLD AUTO: 54 % — SIGNIFICANT CHANGE UP (ref 43–77)
PHOSPHATE SERPL-MCNC: 3.7 MG/DL — SIGNIFICANT CHANGE UP (ref 2.5–4.5)
PLATELET # BLD AUTO: 66 K/UL — LOW (ref 150–400)
POTASSIUM SERPL-MCNC: 4.4 MMOL/L — SIGNIFICANT CHANGE UP (ref 3.5–5.3)
POTASSIUM SERPL-SCNC: 4.4 MMOL/L — SIGNIFICANT CHANGE UP (ref 3.5–5.3)
PROT SERPL-MCNC: 5.6 G/DL — LOW (ref 6–8.3)
PROTHROM AB SERPL-ACNC: 13.1 SEC — HIGH (ref 10–12.9)
RBC # BLD: 2.53 M/UL — LOW (ref 4.2–5.8)
RBC # FLD: 16.2 % — HIGH (ref 10.3–14.5)
RH IG SCN BLD-IMP: POSITIVE — SIGNIFICANT CHANGE UP
SODIUM SERPL-SCNC: 139 MMOL/L — SIGNIFICANT CHANGE UP (ref 135–145)
WBC # BLD: 6 K/UL — SIGNIFICANT CHANGE UP (ref 3.8–10.5)
WBC # FLD AUTO: 6 K/UL — SIGNIFICANT CHANGE UP (ref 3.8–10.5)

## 2019-07-25 PROCEDURE — 71045 X-RAY EXAM CHEST 1 VIEW: CPT | Mod: 26

## 2019-07-25 PROCEDURE — 99223 1ST HOSP IP/OBS HIGH 75: CPT

## 2019-07-25 RX ORDER — CHLORHEXIDINE GLUCONATE 213 G/1000ML
1 SOLUTION TOPICAL
Refills: 0 | Status: DISCONTINUED | OUTPATIENT
Start: 2019-07-26 | End: 2019-07-26

## 2019-07-25 RX ORDER — FINASTERIDE 5 MG/1
5 TABLET, FILM COATED ORAL DAILY
Refills: 0 | Status: DISCONTINUED | OUTPATIENT
Start: 2019-07-25 | End: 2019-07-26

## 2019-07-25 RX ORDER — METOPROLOL TARTRATE 50 MG
12.5 TABLET ORAL
Refills: 0 | Status: DISCONTINUED | OUTPATIENT
Start: 2019-07-25 | End: 2019-07-26

## 2019-07-25 RX ORDER — DOCUSATE SODIUM 100 MG
100 CAPSULE ORAL THREE TIMES A DAY
Refills: 0 | Status: DISCONTINUED | OUTPATIENT
Start: 2019-07-25 | End: 2019-07-26

## 2019-07-25 RX ORDER — POLYETHYLENE GLYCOL 3350 17 G/17G
17 POWDER, FOR SOLUTION ORAL
Refills: 0 | Status: DISCONTINUED | OUTPATIENT
Start: 2019-07-25 | End: 2019-07-26

## 2019-07-25 RX ORDER — SODIUM CHLORIDE 9 MG/ML
1000 INJECTION INTRAMUSCULAR; INTRAVENOUS; SUBCUTANEOUS
Refills: 0 | Status: DISCONTINUED | OUTPATIENT
Start: 2019-07-25 | End: 2019-07-26

## 2019-07-25 RX ORDER — SENNA PLUS 8.6 MG/1
2 TABLET ORAL
Refills: 0 | Status: DISCONTINUED | OUTPATIENT
Start: 2019-07-25 | End: 2019-07-26

## 2019-07-25 RX ORDER — CEFTRIAXONE 500 MG/1
2 INJECTION, POWDER, FOR SOLUTION INTRAMUSCULAR; INTRAVENOUS EVERY 24 HOURS
Refills: 0 | Status: DISCONTINUED | OUTPATIENT
Start: 2019-07-25 | End: 2019-07-26

## 2019-07-25 RX ORDER — VORICONAZOLE 10 MG/ML
200 INJECTION, POWDER, LYOPHILIZED, FOR SOLUTION INTRAVENOUS EVERY 12 HOURS
Refills: 0 | Status: DISCONTINUED | OUTPATIENT
Start: 2019-07-25 | End: 2019-07-26

## 2019-07-25 RX ORDER — SERTRALINE 25 MG/1
50 TABLET, FILM COATED ORAL DAILY
Refills: 0 | Status: DISCONTINUED | OUTPATIENT
Start: 2019-07-25 | End: 2019-07-26

## 2019-07-25 RX ADMIN — Medication 100 MILLIGRAM(S): at 21:53

## 2019-07-25 RX ADMIN — VORICONAZOLE 200 MILLIGRAM(S): 10 INJECTION, POWDER, LYOPHILIZED, FOR SOLUTION INTRAVENOUS at 17:10

## 2019-07-25 RX ADMIN — Medication 12.5 MILLIGRAM(S): at 17:11

## 2019-07-25 RX ADMIN — Medication 100 MILLIGRAM(S): at 17:10

## 2019-07-25 RX ADMIN — CEFTRIAXONE 100 MILLIGRAM(S): 500 INJECTION, POWDER, FOR SOLUTION INTRAMUSCULAR; INTRAVENOUS at 21:48

## 2019-07-25 RX ADMIN — SENNA PLUS 2 TABLET(S): 8.6 TABLET ORAL at 17:11

## 2019-07-25 NOTE — H&P ADULT - PROBLEM SELECTOR PROBLEM 5
Prophylactic measure Aspergillus H/O cardiomyopathy HFrEF (heart failure with reduced ejection fraction)

## 2019-07-25 NOTE — H&P ADULT - NSHPSOCIALHISTORY_GEN_ALL_CORE
Patient is  and lives with a significant other. He drank 6-8 drinks a day until recently. He is a current smoker but has been using a nicotine patch. He works as a  but on disability at this time and has 4 grown children. Patient is  and lives with a significant other. He drank 6-8 drinks a day until dx of AML. Last drink was 3 days ago patient had 1 beer. Patient quit smoking on 4/4/19. He previously worked as a  but on disability at this time and has 4 grown children.

## 2019-07-25 NOTE — H&P ADULT - PROBLEM SELECTOR PLAN 3
Followed by Cardiology  MUGA  start toprol xl 12.5mg daily For Amputation tomorrow  NPO after midnight  T&S and coags ordered  Paged Podiatry, awaiting return call to assess if any further orders needed Patient is not neutropenic, afebrile  If febrile Pan Cx and CXR  Continue Ceftriaxone 2gm IV q day for 6 weeks (through 8/21)  Continue Voriconazole for Aspergillus of face Patient is not neutropenic, afebrile  If febrile Pan Cx and CXR  Continue Ceftriaxone 2gm IV q day for 6 weeks (through 8/21 for osteomyelitis)  Continue Voriconazole for Aspergillus of face

## 2019-07-25 NOTE — H&P ADULT - NSICDXPASTMEDICALHX_GEN_ALL_CORE_FT
PAST MEDICAL HISTORY:  Factor 5 Leiden mutation, heterozygous     H/O cardiomyopathy PAST MEDICAL HISTORY:  Deep vein thrombosis (DVT)     Factor 5 Leiden mutation, heterozygous     H/O cardiomyopathy     COLLEEN (obstructive sleep apnea)     Pulmonary embolism

## 2019-07-25 NOTE — H&P ADULT - PROBLEM SELECTOR PLAN 6
Admit for cycle 1 consolidation HIDAC  Monitor for cerebellar toxicity  pred forte eye drops  Monitor labs, replace blood and lytes PRN  Pain control, antiemetics, IV hydration Followed by Cardiology  MUGA  start toprol xl 12.5mg daily Patient on Xarelto for PPX due to Factor 5.   Hold when plt <50. No pharmacologic ppx 2/2 thrombocytopenia and surgical procedure in am      Contact Information [phone] No pharmacologic ppx 2/2 thrombocytopenia and surgical procedure in am per Podiatry      Contact Information [phone] No pharmacologic ppx 2/2 thrombocytopenia and surgical procedure in am per Podiatry  Once cleared start Heparin 5,000 units SQ q 8 and D/C if PLT <50K      Contact Information [phone]

## 2019-07-25 NOTE — H&P ADULT - NEGATIVE ENMT SYMPTOMS
no hearing difficulty/no nasal congestion/no nasal discharge no nasal congestion/no hearing difficulty

## 2019-07-25 NOTE — H&P ADULT - PROBLEM SELECTOR PLAN 4
Patient is not neutropenic  If febrile culture q48 hours With previous DVT and PE   Xarelto on hold for amputation in am  Will discuss with Podiatry when AC can restart   Hold when plt <50 With previous DVT and PE   Xarelto on hold for toe amputation in am  Will discuss with Podiatry when AC can restart   Hold when PLT <50K

## 2019-07-25 NOTE — H&P ADULT - PROBLEM SELECTOR PLAN 2
Continue with Voriconazole 2/2 h/o fungal infection of face Patient is not neutropenic, afebrile  If febrile Pan Cx and CXR  Continue Ceftriaxone 2gm IV q day for 6 weeks (through 8/21)  Continue Voriconazole for Aspergillus of face Admit for cycle 1 consolidation HiDAc after right toe amputation (to begin on 7/29)  Monitor CBC/Lytes and transfuse replete PRN  Strict Is and Os/Daily weights/Mouth Care  Monitor for cerebellar toxicity  Pred forte eye drops  IVF  Antiemetics

## 2019-07-25 NOTE — DISCHARGE NOTE PROVIDER - NSDCFUADDAPPT_GEN_ALL_CORE_FT
To the [facility] to see Dr. Coelho on To the [facility] to see Dr. Coelho on    Podiatry Discharge Instructions:  Follow up: Please follow up with Dr. Estrada within 1 week of discharge from the hospital, please call [phone] ( [address]) for appointment and discuss that you recently were seen in the hospital.  Wound Care: Please leave your dressing clean dry intact until your follow up appointment  Weight bearing: Please weight bear as tolerated in a surgical shoe.  Antibiotics: Please continue as instructed. To the [facility] to see Dr. Coelho on  To the [facility] for possible platelets on Wednesday 8/7/19 at 1230pm. You then have an appointment to see Dr. Gaytan at 2pm  To the [facility] for possible platelets on  Saturday 8/10/19 at 1pm    Podiatry Discharge Instructions:  Follow up: Please follow up with Dr. Estrada within 1 week of discharge from the hospital, please call [phone] ( [address]) for appointment and discuss that you recently were seen in the hospital.  Wound Care: Please leave your dressing clean dry intact until your follow up appointment  Weight bearing: Please weight bear as tolerated in a surgical shoe.  Antibiotics: Please continue as instructed. You will be called regarding follow up with Dr. Coelho. If you do not hear from them by Monday 8/5 please call [phone]  To the [facility] for possible platelets on Wednesday 8/7/19 at 1230pm. You then have an appointment to see Dr. Gaytan at 2pm  To the [facility] for possible platelets on  Saturday 8/10/19 at 1pm    Podiatry Discharge Instructions:  Follow up: Please follow up with Dr. Estrada within 1 week of discharge from the hospital, please call [phone] ( [address]) for appointment and discuss that you recently were seen in the hospital.  Wound Care: Please leave your dressing clean dry intact until your follow up appointment  Weight bearing: Please weight bear as tolerated in a surgical shoe.  Antibiotics: Please continue as instructed. You will be called regarding follow up with Dr. Coelho. If you do not hear from them by Monday 8/5 please call [phone]  To the [facility] for possible platelets on Wednesday 8/7/19 at 1230pm. You then have an appointment to see Dr. Gaytan at 2pm  To the [facility] for possible platelets on  Saturday 8/10/19 at 1pm  To [facility] on 8/19 at 4pm with Dr. Coelho   Podiatry Discharge Instructions:  Follow up: Please follow up with Dr. Estrada within 1 week of discharge from the hospital, please call [phone] ( [address]) for appointment and discuss that you recently were seen in the hospital.  Wound Care: Please leave your dressing clean dry intact until your follow up appointment  Weight bearing: Please weight bear as tolerated in a surgical shoe.  Antibiotics: Please continue as instructed. To the [facility] for possible platelets on Wednesday 8/7/19 at 1230pm. You then have an appointment to see Dr. Gaytan at 2pm  To the [facility] for possible platelets on  Saturday 8/10/19 at 1pm  To [facility] on 8/19 at 4pm to see Dr. Coelho   Podiatry Discharge Instructions:  Follow up: Please follow up with Dr. Estrada within 1 week of discharge from the hospital, please call [phone] ( [address]) for appointment and discuss that you recently were seen in the hospital.  Wound Care: Please leave your dressing clean dry intact until your follow up appointment  Weight bearing: Please weight bear as tolerated in a surgical shoe.  Antibiotics: Please continue as instructed.

## 2019-07-25 NOTE — DISCHARGE NOTE PROVIDER - HOSPITAL COURSE
This is a 58yo M with PMHx of HFrEF (recent EF 28%), COLLEEN, DVT/PE, HTN, Factor V Leiden and AML diagnosed in 4/2019 s/p induction chemotherapy now in morphologic remission admitted for amputation of right toe due to osteomyelitis followed by consolidation chemotherapy with cycle 1 HiDAc. This is a 58yo M with PMHx of HFrEF (recent EF 28%), COLLEEN, DVT/PE, HTN, Factor V Leiden and AML diagnosed in 4/2019 s/p induction chemotherapy now in morphologic remission admitted for amputation of right toe due to osteomyelitis followed by consolidation chemotherapy with cycle 1 HiDAc. Patient had a partial hallaux amputation done on 7/26. This is a 58yo M with PMHx of HFrEF (recent EF 28%), COLLEEN, DVT/PE, HTN, Factor V Leiden and AML diagnosed in 4/2019 s/p induction chemotherapy now in morphologic remission admitted for amputation of right toe due to osteomyelitis followed by consolidation chemotherapy with cycle 1 HiDAc. Patient had a partial hallaux amputation done on 7/26. Chemotherapy was initiated on 7/29/19. IVF continued, labs were monitored and supplemented as needed. The patient completed chemotherapy with no adverse reactions and was given appropriate follow up with MD, possible platelets as well as podiatry This is a 58yo M with PMHx of HFrEF (recent EF 28%), COLLEEN, DVT/PE, HTN, Factor V Leiden and AML diagnosed in 4/2019 s/p induction chemotherapy now in morphologic remission admitted for amputation of right toe due to osteomyelitis followed by consolidation chemotherapy with cycle 1 HiDAc. Patient had a partial hallaux amputation done on 7/26. Chemotherapy was initiated on 7/29/19. IVF continued, labs were monitored and supplemented as needed. The patient completed chemotherapy with no adverse reactions and was given appropriate follow up with MD, possible platelets as well as podiatry. This is a 60yo M with PMHx of HFrEF (recent EF 28%), COLLEEN, DVT/PE, HTN, Factor V Leiden and AML diagnosed in 4/2019 s/p induction chemotherapy now in morphologic remission admitted for amputation of right toe due to osteomyelitis followed by consolidation chemotherapy with cycle 1 HiDAc. Patient had a partial hallaux amputation done on 7/26. Chemotherapy was initiated on 7/29/19. IVF continued, labs were monitored and supplemented as needed. The patient completed chemotherapy with no adverse reactions and was given appropriate follow up with MD, possible platelets as well as podiatry. Prior to discharge patient recivied one bag of platelets and shortly after transfusion developed one hive to thigh. The patient did not have any respiratory s/s and was given Benadryl 25mg PO x 1 and monitored for 1 1/2 hours prior to discharge. This is a 58yo M with PMHx of HFrEF (recent EF 28%), COLLEEN, DVT/PE, HTN, Factor V Leiden and AML diagnosed in 4/2019 s/p induction chemotherapy now in morphologic remission admitted for amputation of right toe due to osteomyelitis followed by consolidation chemotherapy with cycle 1 HiDAc. Patient had a partial hallaux amputation done on 7/26. Chemotherapy was initiated on 7/29/19. IVF continued, labs were monitored and supplemented as needed. The patient completed chemotherapy with no adverse reactions and was given appropriate follow up with MD, possible platelets as well as podiatry. Xarelto placed on hold for thrombocytopenia prior to discharge. Prior to discharge patient recivied one bag of platelets and shortly after transfusion developed one hive to thigh. The patient did not have any respiratory s/s and was given Benadryl 25mg PO x 1 and monitored for 1 1/2 hours prior to discharge.

## 2019-07-25 NOTE — DISCHARGE NOTE PROVIDER - CARE PROVIDER_API CALL
Debbi Coelho (DO)  Hematology; Medical Oncology  [address] [place] [address]  Phone: [phone]  Fax: [phone]  Follow Up Time:

## 2019-07-25 NOTE — DISCHARGE NOTE PROVIDER - NSDCCPCAREPLAN_GEN_ALL_CORE_FT
PRINCIPAL DISCHARGE DIAGNOSIS  Diagnosis: AML (acute myeloid leukemia)  Assessment and Plan of Treatment: Maintain counts and remian free from infection.  Notify MD and report to the ER for any Temp greater than or equal to 100.4, intractable nausea, vomiting, diarrhea or uncontrollable bleeding.      SECONDARY DISCHARGE DIAGNOSES  Diagnosis: Osteomyelitis  Assessment and Plan of Treatment: PRINCIPAL DISCHARGE DIAGNOSIS  Diagnosis: AML (acute myeloid leukemia)  Assessment and Plan of Treatment: Maintain counts and remian free from infection.  Notify MD and report to the ER for any Temp greater than or equal to 100.4, intractable nausea, vomiting, diarrhea or uncontrollable bleeding.      SECONDARY DISCHARGE DIAGNOSES  Diagnosis: Osteomyelitis  Assessment and Plan of Treatment: follow up with podiatry

## 2019-07-25 NOTE — H&P ADULT - PROBLEM SELECTOR PLAN 1
Patient with h/o DVT and PE on AC for prophylaxis given factor V.   Hold when plt <50 Admit for cycle 1 consolidation HiDAc after right toe amputation (to [facility]adira on 7/29)  Monitor for cerebellar toxicity  pred forte eye drops  Monitor labs, replace blood and lytes PRN  Pain control, antiemetics, IV hydration For Amputation tomorrow  Continue abx as written below  NPO after midnight  T&S and coags ordered  Paged Podiatry, awaiting return call to assess if any further orders needed For Amputation tomorrow  Continue abx as written below  NPO after midnight  T&S and coags ordered  Discussed case and orders with Podiatry For Amputation tomorrow  Continue abx as written below  NPO after midnight  T&S and coags ordered  Discussed case and orders with Podiatry, they are also aware last dose of Xarelto was last night and PLT count was 80K on 7/22. Will follow up labs from today For Amputation tomorrow  Continue abx as written below  NPO after midnight  T&S and coags ordered  Discussed case and orders with Podiatry, they are also aware last dose of Xarelto was last night and PLT count was 80K on 7/22. If PLT <80K in am 1 bag PLT available for activation  Cardiology and Medical clearance called

## 2019-07-25 NOTE — H&P ADULT - HISTORY OF PRESENT ILLNESS
This is a 60yo M with PMHx of HFrEF (recent EF 28%), COLLEEN, DVT/PE, HTN, Factor V Leiden and AML diagnosed in 4/2019 s/p induction chemotherapy now in morphologic remission admitted for amputation of right toe due to osteomyelitis followed by consolidation chemotherapy with cycle 1 HiDAc.    Patient was initially being followed by [facility] for factor V Leiden on Xarelto. Labs were normal at check up. Had increased SOB and had repeat labs done and was told to go to ER. Patient went to [facility] hospital due to insurance reasons. He was diagnosed with AML and underwent treated with induction chemotherapy7+3. Day 14 BM bx showed PD and patient was reinduced with 5+2 at which time he developed a wound on the right tow with concern for osteomyelitis as well as right cheek fungalinfection with aspergillosis and neutropenic fevers of 105. After hospitalization, patient was discharged to Subacute rehab for 3weeks. After discharge from rehab due to insurance reasons, the patient was seen at [facility] [place] Coelho. BM bx showed a CR and plan was made for consolidation chemo with HiDAc.    Patient was admitted for chemotherapy on 7/11/19 and chemotherapy was held for osteomyelitis of right toe and the patient was discharged home on  6 weeks of Ceftriaxone (to complete on 8/21/19). After follow up with podiatry the decision was made to readmit for amputation and then will proceed with Cycle 1 of HiDAc.    Upon admission the patient has no complaints and denies headache, dizziness, visual changes, chest pain, palpitations, SOB, abdominal pain, nausea, vomiting, diarrhea or dysuria.      The patient will need to proceed to Allogenic transplant and has and appointment with Dr. Gaytan in August. This is a 60yo M with PMHx of HFrEF (recent EF 28%), COLLEEN, DVT/PE, HTN, Factor V Leiden and AML diagnosed in 4/2019 s/p induction chemotherapy now in morphologic remission admitted for amputation of right toe due to osteomyelitis followed by consolidation chemotherapy with cycle 1 HiDAc.    Patient was initially being followed by [facility] for factor V Leiden on Xarelto. Labs were normal at check up. Had increased SOB and had repeat labs done and was told to go to ER. Patient went to [facility] hospital due to insurance reasons. He was diagnosed with AML and underwent treated with induction chemotherapy7+3. Day 14 BM bx showed PD and patient was reinduced with 5+2 at which time he developed a wound on the right tow with concern for osteomyelitis as well as right cheek fungalinfection with aspergillosis and neutropenic fevers of 105. After hospitalization, patient was discharged to Subacute rehab for 3weeks. After discharge from rehab due to insurance reasons, the patient was seen at [facility] [place] by [place]. Coelho. BM bx showed a CR and plan was made for consolidation chemo with HiDAc.    Patient was admitted for chemotherapy on 7/11/19 and chemotherapy was held for osteomyelitis of right great toe and the patient was discharged home on  6 weeks of Ceftriaxone (to complete on 8/21/19). After follow up with podiatry the decision was made to readmit for amputation and then will proceed with Cycle 1 of HiDAc.    Upon admission the patient has no complaints and denies headache, dizziness, visual changes, chest pain, palpitations, SOB, abdominal pain, nausea, vomiting, diarrhea or dysuria.      The patient will need to proceed to Allogenic transplant and has and appointment with Dr. Gaytan in August. This is a 60yo M with PMHx of HFrEF (recent EF 28%), COLLEEN, DVT/PE, HTN, Factor V Leiden and AML diagnosed in 4/2019 s/p induction chemotherapy now in morphologic remission admitted for amputation of right toe due to osteomyelitis followed by consolidation chemotherapy with cycle 1 HiDAc.    Patient was initially being followed by [facility] for factor V Leiden on Xarelto. Labs were normal at check up. Had increased SOB and had repeat labs done and was told to go to ER. Patient went to [facility] hospital due to insurance reasons. He was diagnosed with AML and underwent treated with induction chemotherapy7+3. Day 14 BM bx showed PD and patient was reinduced with 5+2 at which time he developed a wound on the right tow with concern for osteomyelitis as well as right cheek fungalinfection with aspergillosis and neutropenic fevers of 105. After hospitalization, patient was discharged to Subacute rehab for 3weeks. After discharge from rehab due to insurance reasons, the [place] [facility] [place]. BM bx showed a CR and plan was made for consolidation chemo with HiDAc.    Patient was admitted for chemotherapy on 7/11/19 and chemotherapy was held for osteomyelitis of right great toe and was seen by ID and Podiatry and the patient was discharged home on 6 weeks of Ceftriaxone (to complete on 8/21/19). After follow up with podiatry the decision was made to readmit for amputation and then will proceed with Cycle 1 of HiDAc due to risk of relapse.    Upon admission the patient has no complaints and denies headache, dizziness, visual changes, chest pain, palpitations, SOB, abdominal pain, nausea, vomiting, diarrhea or dysuria.      The patient will need to proceed to Allogenic transplant and has and appointment with Dr. Gaytan in August.

## 2019-07-25 NOTE — H&P ADULT - PROBLEM SELECTOR PLAN 5
Patient on Xarelto for PPX due to Factor 5.   Hold when plt <50. Continue with Voriconazole 2/2 h/o fungal infection of face 7/12 MUGA with EF 28%  Continue Toprol XL 12.5 mg PO q 12   Keep K>4 and Mg>2  Follows with Cardiology outpatient  MUGA  start toprol xl 12.5mg daily 7/12 MUGA with EF 28%  Continue Toprol XL 12.5 mg PO q 12   Keep K>4 and Mg>2  Follows with Cardiology outpatient

## 2019-07-25 NOTE — H&P ADULT - LYMPHATIC
axillary L/femoral R/posterior cervical R/anterior cervical R/supraclavicular R/inguinal L/posterior cervical L/inguinal R/femoral L/anterior cervical L/supraclavicular L/axillary R

## 2019-07-25 NOTE — H&P ADULT - PROBLEM SELECTOR PLAN 7
fu MRI of right foot  wound care  ID following  Start Rocephin 1gm IV daily Patient on Xarelto for PPX due to Factor 5.   Hold when plt <50. No pharmacologic ppx 2/2 thrombocytopenia and surgical procedure in am      Contact Information [phone]

## 2019-07-25 NOTE — PATIENT PROFILE ADULT - BRADEN [employer]
Patient seen for wound to wilmar scrotal skin. Noted sacral and coccyx area clear. Old healed scars from previous flap surgery. Was being followed by outpatient wound clinic. Present open area is 1x4x1. 3 cm with pale pink base. Saline soak in use, recommend this be continued while in acute care. Patient could not give detailed wound information. Is in nursing home for cares, they may resume their treatment plan when transferred back. Is on [place] mattress. Keep turned frequently. Floated heels off bed at this time. (3) slightly limited

## 2019-07-25 NOTE — H&P ADULT - PROBLEM SELECTOR PROBLEM 1
Factor 5 Leiden mutation, heterozygous AML (acute myeloid leukemia) Osteomyelitis of toe of right foot

## 2019-07-25 NOTE — DISCHARGE NOTE PROVIDER - NSDCFUSCHEDAPPT_GEN_ALL_CORE_FT
ZOLLI, DI ; 08/07/2019 ; [facility]  ZOLLI, DI ; 08/07/2019 ; [facility]  ZOLLI, DI ; 08/07/2019 ; [facility]  ZOLLI, DI ; 08/10/2019 ; [facility]  ZOLLI, DI ; 08/12/2019 ; [facility]  ZOLLI, DI ; 08/14/2019 ; [facility]  ZOLLI, DI ; 08/16/2019 ; [facility]  ZOLLI, DI ; 08/19/2019 ; [facility]  ZOLLI, DI ; 08/21/2019 ; [facility]  ZOLLI, DI ; 08/23/2019 ; [facility] ZOLLI, DI ; 08/07/2019 ; [facility]  ZOLLI, DI ; 08/07/2019 ; [facility]  ZOLLI, DI ; 08/07/2019 ; [facility]  ZOLLI, DI ; 08/10/2019 ; [facility]  ZOLLI, DI ; 08/12/2019 ; [facility]  ZOLLI, DI ; 08/14/2019 ; [facility]  ZOLLI, DI ; 08/16/2019 ; [facility]  ZOLLI, DI ; 08/19/2019 ; [facility]  ZOLLI, DI ; 08/19/2019 ; [facility]  ZOLLI, DI ; 08/21/2019 ; [facility]  ZOLLI, DI ; 08/23/2019 ; [facility]

## 2019-07-25 NOTE — CONSULT NOTE ADULT - SUBJECTIVE AND OBJECTIVE BOX
Podiatry pager #: [place] Pager:  [phone] [facility] Pager: [phone]    Patient is a 59y old  Male who presents with a chief complaint of Amputation of right great toe and Cycle 1 of HiDAc (25 Jul 2019 16:05)      HPI:  This is a 58yo M with PMHx of HFrEF (recent EF 28%), COLLEEN, DVT/PE, HTN, Factor V Leiden and AML diagnosed in 4/2019 s/p induction chemotherapy now in morphologic remission admitted for amputation of right toe due to osteomyelitis followed by consolidation chemotherapy with cycle 1 HiDAc.    Patient was initially being followed by [facility] for factor V Leiden on Xarelto. Labs were normal at check up. Had increased SOB and had repeat labs done and was told to go to ER. Patient went to [facility] hospital due to insurance reasons. He was diagnosed with AML and underwent treated with induction chemotherapy7+3. Day 14 BM bx showed PD and patient was reinduced with 5+2 at which time he developed a wound on the right tow with concern for osteomyelitis as well as right cheek fungalinfection with aspergillosis and neutropenic fevers of 105. After hospitalization, patient was discharged to Subacute rehab for 3weeks. After discharge from rehab due to insurance reasons, the patient was seen at [facility] [place] Coelho. BM bx showed a CR and plan was made for consolidation chemo with HiDAc.    Patient was admitted for chemotherapy on 7/11/19 and chemotherapy was held for osteomyelitis of right great toe and was seen by ID and Podiatry and the patient was discharged home on 6 weeks of Ceftriaxone (to complete on 8/21/19). After follow up with podiatry the decision was made to readmit for amputation and then will proceed with Cycle 1 of HiDAc due to risk of relapse.    Upon admission the patient has no complaints and denies headache, dizziness, visual changes, chest pain, palpitations, SOB, abdominal pain, nausea, vomiting, diarrhea or dysuria.      The patient will need to proceed to Allogenic transplant and has and appointment with Dr. Gaytan in August. (25 Jul 2019 13:34)      PAST MEDICAL & SURGICAL HISTORY:  COLLEEN (obstructive sleep apnea)  Pulmonary embolism  Deep vein thrombosis (DVT)  H/O cardiomyopathy  Factor 5 Leiden mutation, heterozygous      MEDICATIONS  (STANDING):  cefTRIAXone   IVPB 2 milliGRAM(s) IV Intermittent every 24 hours  docusate sodium 100 milliGRAM(s) Oral three times a day  finasteride 5 milliGRAM(s) Oral daily  metoprolol succinate ER 12.5 milliGRAM(s) Oral two times a day  senna 2 Tablet(s) Oral two times a day  sertraline 50 milliGRAM(s) Oral daily  sodium chloride 0.9%. 1000 milliLiter(s) (50 mL/Hr) IV Continuous <Continuous>  voriconazole 200 milliGRAM(s) Oral every 12 hours    MEDICATIONS  (PRN):  polyethylene glycol 3350 17 Gram(s) Oral two times a day PRN Constipation      Allergies    No Known Allergies    Intolerances        VITALS:    Vital Signs Last 24 Hrs  T(C): 36.6 (25 Jul 2019 13:53), Max: 36.6 (25 Jul 2019 13:53)  T(F): 97.8 (25 Jul 2019 13:53), Max: 97.8 (25 Jul 2019 13:53)  HR: 88 (25 Jul 2019 13:53) (88 - 88)  BP: 122/71 (25 Jul 2019 13:53) (122/71 - 122/71)  BP(mean): --  RR: 18 (25 Jul 2019 13:53) (18 - 18)  SpO2: 97% (25 Jul 2019 13:53) (97% - 97%)    LABS:                CAPILLARY BLOOD GLUCOSE              LOWER EXTREMITY PHYSICAL EXAM:    Vasular: DP/PT 2/4, B/L, CFT <5 seconds B/L, Temperature gradient normal, B/L.   Neuro: Epicritic sensation intact to the level of digits, B/L.  Musculoskeletal/Ortho: no gross deformity bl  Skin: right hallux nail bed wound to bone, fibrotic, no active drainage, no pus, no malodor. There is slight erythema and mild dactylitis to the right hallux.   etiology: patient injured the nail months ago

## 2019-07-25 NOTE — CONSULT NOTE ADULT - ASSESSMENT
60 yo M presents with right hallux osteomyelitis  - Pt seen and evaluated  - RF hallux wound stable, no purulence ascending cellulitis   - ID following patient; continue w/ abx  - Booked for RF partial hallux amputation tomorrow 4/25 at 1:00 pm  - Please document medical optimization for local anesthesia with sedation   - NPO after midnight   - Podiatry will follow  - D/w attending

## 2019-07-25 NOTE — H&P ADULT - ASSESSMENT
Patient is a 59 year old male who presents for cycle 1 HIDAC. This is a 58yo M with PMHx of HFrEF (recent EF 28%), COLLEEN, DVT/PE, HTN, Factor V Leiden and AML diagnosed in 4/2019 s/p induction chemotherapy now in morphologic remission admitted for amputation of right toe due to osteomyelitis followed by consolidation chemotherapy with cycle 1 HiDAc. This is a 60yo M with PMHx of HFrEF (recent EF 28%), COLLEEN, DVT/PE, HTN, Factor V Leiden and AML diagnosed in 4/2019 s/p induction chemotherapy now in morphologic remission admitted for amputation of right great toe due to osteomyelitis to be followed by consolidation chemotherapy with cycle 1 HiDAc.

## 2019-07-26 ENCOUNTER — APPOINTMENT (OUTPATIENT)
Dept: UROLOGY | Facility: CLINIC | Age: 59
End: 2019-07-26

## 2019-07-26 ENCOUNTER — RESULT REVIEW (OUTPATIENT)
Age: 59
End: 2019-07-26

## 2019-07-26 DIAGNOSIS — C92.00 ACUTE MYELOBLASTIC LEUKEMIA, NOT HAVING ACHIEVED REMISSION: ICD-10-CM

## 2019-07-26 DIAGNOSIS — D68.51 ACTIVATED PROTEIN C RESISTANCE: ICD-10-CM

## 2019-07-26 DIAGNOSIS — M86.9 OSTEOMYELITIS, UNSPECIFIED: ICD-10-CM

## 2019-07-26 DIAGNOSIS — B99.9 UNSPECIFIED INFECTIOUS DISEASE: ICD-10-CM

## 2019-07-26 DIAGNOSIS — Z29.9 ENCOUNTER FOR PROPHYLACTIC MEASURES, UNSPECIFIED: ICD-10-CM

## 2019-07-26 LAB
APTT BLD: 26.3 SEC — LOW (ref 27.5–36.3)
BASOPHILS # BLD AUTO: 0 K/UL — SIGNIFICANT CHANGE UP (ref 0–0.2)
BASOPHILS # BLD AUTO: 0 K/UL — SIGNIFICANT CHANGE UP (ref 0–0.2)
BASOPHILS NFR BLD AUTO: 0.1 % — SIGNIFICANT CHANGE UP (ref 0–2)
BASOPHILS NFR BLD AUTO: 0.2 % — SIGNIFICANT CHANGE UP (ref 0–2)
BLD GP AB SCN SERPL QL: NEGATIVE — SIGNIFICANT CHANGE UP
EOSINOPHIL # BLD AUTO: 0.1 K/UL — SIGNIFICANT CHANGE UP (ref 0–0.5)
EOSINOPHIL # BLD AUTO: 0.1 K/UL — SIGNIFICANT CHANGE UP (ref 0–0.5)
EOSINOPHIL NFR BLD AUTO: 1.4 % — SIGNIFICANT CHANGE UP (ref 0–6)
EOSINOPHIL NFR BLD AUTO: 2.2 % — SIGNIFICANT CHANGE UP (ref 0–6)
HCT VFR BLD CALC: 25.2 % — LOW (ref 39–50)
HCT VFR BLD CALC: 27.1 % — LOW (ref 39–50)
HGB BLD-MCNC: 9 G/DL — LOW (ref 13–17)
HGB BLD-MCNC: 9.1 G/DL — LOW (ref 13–17)
INR BLD: 1.03 RATIO — SIGNIFICANT CHANGE UP (ref 0.88–1.16)
LYMPHOCYTES # BLD AUTO: 2.2 K/UL — SIGNIFICANT CHANGE UP (ref 1–3.3)
LYMPHOCYTES # BLD AUTO: 2.4 K/UL — SIGNIFICANT CHANGE UP (ref 1–3.3)
LYMPHOCYTES # BLD AUTO: 31.4 % — SIGNIFICANT CHANGE UP (ref 13–44)
LYMPHOCYTES # BLD AUTO: 37.4 % — SIGNIFICANT CHANGE UP (ref 13–44)
MAGNESIUM SERPL-MCNC: 1.9 MG/DL — SIGNIFICANT CHANGE UP (ref 1.6–2.6)
MCHC RBC-ENTMCNC: 32.8 PG — SIGNIFICANT CHANGE UP (ref 27–34)
MCHC RBC-ENTMCNC: 33.6 GM/DL — SIGNIFICANT CHANGE UP (ref 32–36)
MCHC RBC-ENTMCNC: 34.9 PG — HIGH (ref 27–34)
MCHC RBC-ENTMCNC: 35.6 GM/DL — SIGNIFICANT CHANGE UP (ref 32–36)
MCV RBC AUTO: 97.6 FL — SIGNIFICANT CHANGE UP (ref 80–100)
MCV RBC AUTO: 98 FL — SIGNIFICANT CHANGE UP (ref 80–100)
MONOCYTES # BLD AUTO: 0.6 K/UL — SIGNIFICANT CHANGE UP (ref 0–0.9)
MONOCYTES # BLD AUTO: 0.7 K/UL — SIGNIFICANT CHANGE UP (ref 0–0.9)
MONOCYTES NFR BLD AUTO: 10.1 % — SIGNIFICANT CHANGE UP (ref 2–14)
MONOCYTES NFR BLD AUTO: 9.8 % — SIGNIFICANT CHANGE UP (ref 2–14)
NEUTROPHILS # BLD AUTO: 3.2 K/UL — SIGNIFICANT CHANGE UP (ref 1.8–7.4)
NEUTROPHILS # BLD AUTO: 4 K/UL — SIGNIFICANT CHANGE UP (ref 1.8–7.4)
NEUTROPHILS NFR BLD AUTO: 50.1 % — SIGNIFICANT CHANGE UP (ref 43–77)
NEUTROPHILS NFR BLD AUTO: 57.2 % — SIGNIFICANT CHANGE UP (ref 43–77)
PHOSPHATE SERPL-MCNC: 4.4 MG/DL — SIGNIFICANT CHANGE UP (ref 2.5–4.5)
PLATELET # BLD AUTO: 110 K/UL — LOW (ref 150–400)
PLATELET # BLD AUTO: 73 K/UL — LOW (ref 150–400)
PROTHROM AB SERPL-ACNC: 11.7 SEC — SIGNIFICANT CHANGE UP (ref 10–13.1)
RBC # BLD: 2.57 M/UL — LOW (ref 4.2–5.8)
RBC # BLD: 2.77 M/UL — LOW (ref 4.2–5.8)
RBC # FLD: 16 % — HIGH (ref 10.3–14.5)
RBC # FLD: 16.1 % — HIGH (ref 10.3–14.5)
RH IG SCN BLD-IMP: POSITIVE — SIGNIFICANT CHANGE UP
WBC # BLD: 6.3 K/UL — SIGNIFICANT CHANGE UP (ref 3.8–10.5)
WBC # BLD: 6.9 K/UL — SIGNIFICANT CHANGE UP (ref 3.8–10.5)
WBC # FLD AUTO: 6.3 K/UL — SIGNIFICANT CHANGE UP (ref 3.8–10.5)
WBC # FLD AUTO: 6.9 K/UL — SIGNIFICANT CHANGE UP (ref 3.8–10.5)

## 2019-07-26 PROCEDURE — 88307 TISSUE EXAM BY PATHOLOGIST: CPT | Mod: 26

## 2019-07-26 PROCEDURE — 99223 1ST HOSP IP/OBS HIGH 75: CPT

## 2019-07-26 PROCEDURE — 99232 SBSQ HOSP IP/OBS MODERATE 35: CPT

## 2019-07-26 PROCEDURE — 88311 DECALCIFY TISSUE: CPT | Mod: 26

## 2019-07-26 PROCEDURE — 73630 X-RAY EXAM OF FOOT: CPT | Mod: 26,RT

## 2019-07-26 PROCEDURE — 88305 TISSUE EXAM BY PATHOLOGIST: CPT | Mod: 26

## 2019-07-26 RX ORDER — DOCUSATE SODIUM 100 MG
100 CAPSULE ORAL THREE TIMES A DAY
Refills: 0 | Status: DISCONTINUED | OUTPATIENT
Start: 2019-07-26 | End: 2019-08-03

## 2019-07-26 RX ORDER — POLYETHYLENE GLYCOL 3350 17 G/17G
17 POWDER, FOR SOLUTION ORAL
Refills: 0 | Status: DISCONTINUED | OUTPATIENT
Start: 2019-07-26 | End: 2019-08-03

## 2019-07-26 RX ORDER — HYDROMORPHONE HYDROCHLORIDE 2 MG/ML
0.5 INJECTION INTRAMUSCULAR; INTRAVENOUS; SUBCUTANEOUS
Refills: 0 | Status: DISCONTINUED | OUTPATIENT
Start: 2019-07-26 | End: 2019-07-26

## 2019-07-26 RX ORDER — SODIUM CHLORIDE 9 MG/ML
1000 INJECTION INTRAMUSCULAR; INTRAVENOUS; SUBCUTANEOUS
Refills: 0 | Status: DISCONTINUED | OUTPATIENT
Start: 2019-07-26 | End: 2019-07-28

## 2019-07-26 RX ORDER — ACETAMINOPHEN 500 MG
650 TABLET ORAL EVERY 6 HOURS
Refills: 0 | Status: DISCONTINUED | OUTPATIENT
Start: 2019-07-26 | End: 2019-08-03

## 2019-07-26 RX ORDER — SERTRALINE 25 MG/1
50 TABLET, FILM COATED ORAL DAILY
Refills: 0 | Status: DISCONTINUED | OUTPATIENT
Start: 2019-07-26 | End: 2019-08-03

## 2019-07-26 RX ORDER — SENNA PLUS 8.6 MG/1
2 TABLET ORAL
Refills: 0 | Status: DISCONTINUED | OUTPATIENT
Start: 2019-07-26 | End: 2019-08-03

## 2019-07-26 RX ORDER — METOPROLOL TARTRATE 50 MG
12.5 TABLET ORAL
Refills: 0 | Status: DISCONTINUED | OUTPATIENT
Start: 2019-07-26 | End: 2019-08-03

## 2019-07-26 RX ORDER — CHLORHEXIDINE GLUCONATE 213 G/1000ML
1 SOLUTION TOPICAL
Refills: 0 | Status: DISCONTINUED | OUTPATIENT
Start: 2019-07-26 | End: 2019-08-03

## 2019-07-26 RX ORDER — MAGNESIUM SULFATE 500 MG/ML
2 VIAL (ML) INJECTION ONCE
Refills: 0 | Status: COMPLETED | OUTPATIENT
Start: 2019-07-26 | End: 2019-07-26

## 2019-07-26 RX ORDER — OXYCODONE AND ACETAMINOPHEN 5; 325 MG/1; MG/1
1 TABLET ORAL EVERY 4 HOURS
Refills: 0 | Status: DISCONTINUED | OUTPATIENT
Start: 2019-07-26 | End: 2019-07-31

## 2019-07-26 RX ORDER — FINASTERIDE 5 MG/1
5 TABLET, FILM COATED ORAL DAILY
Refills: 0 | Status: DISCONTINUED | OUTPATIENT
Start: 2019-07-26 | End: 2019-08-03

## 2019-07-26 RX ORDER — FINASTERIDE 5 MG/1
5 TABLET, FILM COATED ORAL DAILY
Refills: 0 | Status: DISCONTINUED | OUTPATIENT
Start: 2019-07-26 | End: 2019-07-26

## 2019-07-26 RX ORDER — VORICONAZOLE 10 MG/ML
200 INJECTION, POWDER, LYOPHILIZED, FOR SOLUTION INTRAVENOUS EVERY 12 HOURS
Refills: 0 | Status: DISCONTINUED | OUTPATIENT
Start: 2019-07-26 | End: 2019-08-03

## 2019-07-26 RX ORDER — ONDANSETRON 8 MG/1
4 TABLET, FILM COATED ORAL ONCE
Refills: 0 | Status: DISCONTINUED | OUTPATIENT
Start: 2019-07-26 | End: 2019-07-26

## 2019-07-26 RX ORDER — CEFTRIAXONE 500 MG/1
2 INJECTION, POWDER, FOR SOLUTION INTRAMUSCULAR; INTRAVENOUS EVERY 24 HOURS
Refills: 0 | Status: DISCONTINUED | OUTPATIENT
Start: 2019-07-26 | End: 2019-07-27

## 2019-07-26 RX ADMIN — VORICONAZOLE 200 MILLIGRAM(S): 10 INJECTION, POWDER, LYOPHILIZED, FOR SOLUTION INTRAVENOUS at 06:08

## 2019-07-26 RX ADMIN — FINASTERIDE 5 MILLIGRAM(S): 5 TABLET, FILM COATED ORAL at 21:11

## 2019-07-26 RX ADMIN — SENNA PLUS 2 TABLET(S): 8.6 TABLET ORAL at 06:08

## 2019-07-26 RX ADMIN — SODIUM CHLORIDE 50 MILLILITER(S): 9 INJECTION INTRAMUSCULAR; INTRAVENOUS; SUBCUTANEOUS at 06:16

## 2019-07-26 RX ADMIN — VORICONAZOLE 200 MILLIGRAM(S): 10 INJECTION, POWDER, LYOPHILIZED, FOR SOLUTION INTRAVENOUS at 17:51

## 2019-07-26 RX ADMIN — Medication 12.5 MILLIGRAM(S): at 17:51

## 2019-07-26 RX ADMIN — SERTRALINE 50 MILLIGRAM(S): 25 TABLET, FILM COATED ORAL at 17:51

## 2019-07-26 RX ADMIN — CHLORHEXIDINE GLUCONATE 1 APPLICATION(S): 213 SOLUTION TOPICAL at 06:10

## 2019-07-26 RX ADMIN — Medication 12.5 MILLIGRAM(S): at 06:09

## 2019-07-26 RX ADMIN — CEFTRIAXONE 100 MILLIGRAM(S): 500 INJECTION, POWDER, FOR SOLUTION INTRAMUSCULAR; INTRAVENOUS at 21:10

## 2019-07-26 RX ADMIN — SENNA PLUS 2 TABLET(S): 8.6 TABLET ORAL at 17:51

## 2019-07-26 RX ADMIN — Medication 100 MILLIGRAM(S): at 21:11

## 2019-07-26 RX ADMIN — Medication 50 GRAM(S): at 09:45

## 2019-07-26 RX ADMIN — Medication 100 MILLIGRAM(S): at 06:12

## 2019-07-26 NOTE — PROGRESS NOTE ADULT - SUBJECTIVE AND OBJECTIVE BOX
Diagnosis:    Protocol/Chemo Regimen:    Day:     Pt endorsed:    Review of Systems:     Pain scale:     Diet:     Allergies    No Known Allergies    Intolerances        ANTIMICROBIALS  cefTRIAXone   IVPB 2 milliGRAM(s) IV Intermittent every 24 hours  voriconazole 200 milliGRAM(s) Oral every 12 hours      HEME/ONC MEDICATIONS      STANDING MEDICATIONS  chlorhexidine 2% Cloths 1 Application(s) Topical <User Schedule>  docusate sodium 100 milliGRAM(s) Oral three times a day  finasteride 5 milliGRAM(s) Oral daily  metoprolol succinate ER 12.5 milliGRAM(s) Oral two times a day  senna 2 Tablet(s) Oral two times a day  sertraline 50 milliGRAM(s) Oral daily  sodium chloride 0.9%. 1000 milliLiter(s) IV Continuous <Continuous>      PRN MEDICATIONS  polyethylene glycol 3350 17 Gram(s) Oral two times a day PRN        Vital Signs Last 24 Hrs  T(C): 36.2 (26 Jul 2019 07:10), Max: 36.9 (25 Jul 2019 17:06)  T(F): 97.1 (26 Jul 2019 07:10), Max: 98.4 (25 Jul 2019 17:06)  HR: 75 (26 Jul 2019 05:24) (75 - 88)  BP: 116/72 (26 Jul 2019 05:24) (103/65 - 122/74)  BP(mean): --  RR: 18 (26 Jul 2019 05:24) (18 - 20)  SpO2: 93% (26 Jul 2019 05:24) (93% - 98%)    PHYSICAL EXAM  General: NAD  HEENT: PERRLA, EOMOI, clear oropharynx, anicteric sclera, pink conjunctiva  Neck: supple  CV: (+) S1/S2 RRR  Lungs: clear to auscultation, no wheezes or rales  Abdomen: soft, non-tender, non-distended (+) BS  Ext: no clubbing, cyanosis or edema  Skin: no rashes and no petechiae  Neuro: alert and oriented X 3, no focal deficits  Central Line:     RECENT CULTURES:        LABS:                        9.0    6.3   )-----------( 73       ( 26 Jul 2019 05:51 )             25.2         Mean Cell Volume : 98.0 fl  Mean Cell Hemoglobin : 34.9 pg  Mean Cell Hemoglobin Concentration : 35.6 gm/dL  Auto Neutrophil # : 3.2 K/uL  Auto Lymphocyte # : 2.4 K/uL  Auto Monocyte # : 0.6 K/uL  Auto Eosinophil # : 0.1 K/uL  Auto Basophil # : 0.0 K/uL  Auto Neutrophil % : 50.1 %  Auto Lymphocyte % : 37.4 %  Auto Monocyte % : 10.1 %  Auto Eosinophil % : 2.2 %  Auto Basophil % : 0.1 %      07-26    139  |  102  |  14  ----------------------------<  100<H>  4.6   |  24  |  1.46<H>    Ca    9.2      26 Jul 2019 05:05  Phos  4.4     07-26  Mg     1.9     07-26    TPro  5.5<L>  /  Alb  3.5  /  TBili  0.2  /  DBili  x   /  AST  17  /  ALT  11  /  AlkPhos  67  07-26      Mg 1.9  Phos 4.4  Mg 1.8  Phos 3.7      PT/INR - ( 25 Jul 2019 18:44 )   PT: 13.1 sec;   INR: 1.14 ratio         PTT - ( 25 Jul 2019 18:44 )  PTT:27.2 sec        RADIOLOGY & ADDITIONAL STUDIES: Diagnosis: AML/ right toe amputation    Protocol/Chemo Regimen: Cycle # 1 HIDAC (will start on Monday).    Pt endorsed: Feeling well this am.    Review of Systems: Denies any chest pain, palpitation, SOB, abdominal pain, nausea, vomiting or diarrhea.    Pain scale: Denies    Diet: Regular    Allergies: No Known Allergies    ANTIMICROBIALS  cefTRIAXone   IVPB 2 milliGRAM(s) IV Intermittent every 24 hours  voriconazole 200 milliGRAM(s) Oral every 12 hours    STANDING MEDICATIONS  chlorhexidine 2% Cloths 1 Application(s) Topical <User Schedule>  docusate sodium 100 milliGRAM(s) Oral three times a day  finasteride 5 milliGRAM(s) Oral daily  metoprolol succinate ER 12.5 milliGRAM(s) Oral two times a day  senna 2 Tablet(s) Oral two times a day  sertraline 50 milliGRAM(s) Oral daily  sodium chloride 0.9%. 1000 milliLiter(s) IV Continuous <Continuous>    PRN MEDICATIONS  polyethylene glycol 3350 17 Gram(s) Oral two times a day PRN    Vital Signs Last 24 Hrs  T(C): 36.2 (26 Jul 2019 07:10), Max: 36.9 (25 Jul 2019 17:06)  T(F): 97.1 (26 Jul 2019 07:10), Max: 98.4 (25 Jul 2019 17:06)  HR: 75 (26 Jul 2019 05:24) (75 - 88)  BP: 116/72 (26 Jul 2019 05:24) (103/65 - 122/74)  BP(mean): --  RR: 18 (26 Jul 2019 05:24) (18 - 20)  SpO2: 93% (26 Jul 2019 05:24) (93% - 98%)    PHYSICAL EXAM  General: NAD  HEENT: PERRLA, EOMOI, clear oropharynx, anicteric sclera, pink conjunctiva  Neck: supple  CV: (+) S1/S2 RRR  Lungs: clear to auscultation, no wheezes or rales  Abdomen: soft, non-tender, non-distended (+) BS  Ext: no clubbing, cyanosis or edema  Skin: no rashes and no petechiae  Neuro: alert and oriented X 3, no focal deficits  Central Line: Right arm D/L PICC line, CDI.    LABS:                        9.0    6.3   )-----------( 73       ( 26 Jul 2019 05:51 )             25.2   Mean Cell Volume : 98.0 fl  Mean Cell Hemoglobin : 34.9 pg  Mean Cell Hemoglobin Concentration : 35.6 gm/dL  Auto Neutrophil # : 3.2 K/uL  Auto Lymphocyte # : 2.4 K/uL  Auto Monocyte # : 0.6 K/uL  Auto Eosinophil # : 0.1 K/uL  Auto Basophil # : 0.0 K/uL  Auto Neutrophil % : 50.1 %  Auto Lymphocyte % : 37.4 %  Auto Monocyte % : 10.1 %  Auto Eosinophil % : 2.2 %  Auto Basophil % : 0.1 %  07-26    139  |  102  |  14  ----------------------------<  100<H>  4.6   |  24  |  1.46<H>    Ca    9.2      26 Jul 2019 05:05  Phos  4.4     07-26  Mg     1.9     07-26    TPro  5.5<L>  /  Alb  3.5  /  TBili  0.2  /  DBili  x   /  AST  17  /  ALT  11  /  AlkPhos  67  07-26  Mg 1.9  Phos 4.4  Mg 1.8  Phos 3.7  PT/INR - ( 25 Jul 2019 18:44 )   PT: 13.1 sec;   INR: 1.14 ratio    PTT - ( 25 Jul 2019 18:44 )  PTT:27.2 sec    RADIOLOGY & ADDITIONAL STUDIES:   Xray Chest 1 View- PORTABLE-Urgent (07.25.19 @ 16:25) >  Distal tip of the right upper extremity PICC line is in the SVC.

## 2019-07-26 NOTE — PROGRESS NOTE ADULT - PROBLEM SELECTOR PLAN 3
With previous DVT and PE   Xarelto on hold for toe amputation in am  Will discuss with Podiatry when AC can restart   Hold when PLT <50K. With previous DVT and PE   Xarelto on hold for toe amputation today.  Will discuss with Podiatry when AC can restart   Hold when PLT <50K.

## 2019-07-26 NOTE — CONSULT NOTE ADULT - SUBJECTIVE AND OBJECTIVE BOX
[facility] - Ede Zamudio, Riki, Edgar, Gem, Tabitha Garcia  Office Number: [phone]    Initial Consult Note    CHIEF COMPLAINT: Patient is a 59y old  Male who presents with a chief complaint of Amputation of right great toe and Cycle 1 of HiDAc (26 Jul 2019 07:54)      HPI:  This is a 58yo M with PMHx of HFrEF (recent EF 28%), COLLEEN, DVT/PE, HTN, Factor V Leiden and AML diagnosed in 4/2019 s/p induction chemotherapy now in morphologic remission admitted for amputation of right toe due to osteomyelitis followed by consolidation chemotherapy with cycle 1 HiDAc.    Patient was initially being followed by Heme for factor V Leiden on Xarelto. Labs were normal at check up. Had increased SOB and had repeat labs done and was told to go to ER. Patient went to [facility] hospital due to insurance reasons. He was diagnosed with AML and underwent treated with induction chemotherapy7+3. Day 14 BM bx showed PD and patient was reinduced with 5+2 at which time he developed a wound on the right tow with concern for osteomyelitis as well as right cheek fungalinfection with aspergillosis and neutropenic fevers of 105. After hospitalization, patient was discharged to Subacute rehab for 3weeks. After discharge from rehab due to insurance reasons, the patient was seen at [facility] by Dr. Coelho. BM bx showed a CR and plan was made for consolidation chemo with HiDAc.    Patient was admitted for chemotherapy on 7/11/19 and chemotherapy was held for osteomyelitis of right great toe and was seen by ID and Podiatry and the patient was discharged home on 6 weeks of Ceftriaxone (to complete on 8/21/19). After follow up with podiatry the decision was made to readmit for amputation and then will proceed with Cycle 1 of HiDAc due to risk of relapse.    Upon admission the patient has no complaints and denies headache, dizziness, visual changes, chest pain, palpitations, SOB, abdominal pain, nausea, vomiting, diarrhea or dysuria.  The patient will need to proceed to Allogenic transplant and has and appointment with Dr. Gaytan in August. (25 Jul 2019 13:34)    He reports an EF in the 20% range post anthracycline based chemotherapy.  He was recommended to see Dr. yLn in our office and has an appt next week.  He had been on metoprolol in the past, though recently changed it to atenolol because of a borderline BP. His girlfriend reports that his metoprolol was often held at rehab because of hypotension.  He also has a history of Factor V Leiden and DVT, and has been on Xarelo.  He had an echocardiogram in our office 7/2019 with mild MR, eccentric LVH with moderate LV dysfunction (EF 35-40%), and mild aortic root dilatation.  He reports a cath about 5 years ago at [facility] that was unremarkable and a stress test more recently.  MUGA 2 weeks ago with Dilated left ventricle with generalized hypokinesia and EF 28 %, slightly lower than the echocardiogram.    PAST MEDICAL & SURGICAL HISTORY:  COLLEEN (obstructive sleep apnea)  Pulmonary embolism  Deep vein thrombosis (DVT)  H/O cardiomyopathy  Factor 5 Leiden mutation, heterozygous      SOCIAL HISTORY:  + former smoker, no alcohol or drug use    FAMILY HISTORY:    No family history of acute MI or sudden cardiac death.    MEDICATIONS  (STANDING):  cefTRIAXone   IVPB 2 milliGRAM(s) IV Intermittent every 24 hours  chlorhexidine 2% Cloths 1 Application(s) Topical <User Schedule>  docusate sodium 100 milliGRAM(s) Oral three times a day  finasteride 5 milliGRAM(s) Oral daily  metoprolol succinate ER 12.5 milliGRAM(s) Oral two times a day  senna 2 Tablet(s) Oral two times a day  sertraline 50 milliGRAM(s) Oral daily  sodium chloride 0.9%. 1000 milliLiter(s) (50 mL/Hr) IV Continuous <Continuous>  voriconazole 200 milliGRAM(s) Oral every 12 hours    MEDICATIONS  (PRN):  polyethylene glycol 3350 17 Gram(s) Oral two times a day PRN Constipation      Allergies    No Known Allergies    Intolerances        REVIEW OF SYSTEMS:    CONSTITUTIONAL: No weakness, fevers or chills  EYES/ENT: No visual changes;  No vertigo or throat pain   NECK: No pain or stiffness  RESPIRATORY: No cough, wheezing, hemoptysis; No shortness of breath  CARDIOVASCULAR: No chest pain or palpitations  GASTROINTESTINAL: No abdominal pain. No nausea, vomiting, or hematemesis; No diarrhea or constipation. No melena or hematochezia.  GENITOURINARY: No dysuria, frequency or hematuria  NEUROLOGICAL: No numbness or weakness  SKIN: No itching or rash  All other review of systems is negative unless indicated above    VITAL SIGNS:   Vital Signs Last 24 Hrs  T(C): 36.2 (26 Jul 2019 07:10), Max: 36.9 (25 Jul 2019 17:06)  T(F): 97.1 (26 Jul 2019 07:10), Max: 98.4 (25 Jul 2019 17:06)  HR: 75 (26 Jul 2019 05:24) (75 - 88)  BP: 116/72 (26 Jul 2019 05:24) (103/65 - 122/74)  BP(mean): --  RR: 18 (26 Jul 2019 05:24) (18 - 20)  SpO2: 93% (26 Jul 2019 05:24) (93% - 98%)    I&O's Summary    25 Jul 2019 07:01  -  26 Jul 2019 07:00  --------------------------------------------------------  IN: 895 mL / OUT: 500 mL / NET: 395 mL        On Exam:    Constitutional: NAD, alert and oriented x 3  Lungs:  Non-labored, breath sounds are clear bilaterally, No wheezing, rales or rhonchi  Cardiovascular: RRR.  S1 and S2 positive.  No murmurs, rubs, gallops or clicks  Gastrointestinal: Bowel Sounds present, soft, nontender.   Lymph: No peripheral edema; right foot wrapped. No cervical lymphadenopathy.  Neurological: Alert, no focal deficits  Skin: No rashes or ulcers   Psych:  Mood & affect appropriate.    LABS: All Labs Reviewed:                        9.0    6.3   )-----------( 73       ( 26 Jul 2019 05:51 )             25.2                         8.6    6.0   )-----------( 66       ( 25 Jul 2019 20:28 )             25.2     26 Jul 2019 05:05    139    |  102    |  14     ----------------------------<  100    4.6     |  24     |  1.46   25 Jul 2019 20:28    139    |  101    |  12     ----------------------------<  124    4.4     |  24     |  1.31     Ca    9.2        26 Jul 2019 05:05  Ca    9.3        25 Jul 2019 20:28  Phos  4.4       26 Jul 2019 05:05  Phos  3.7       25 Jul 2019 20:28  Mg     1.9       26 Jul 2019 05:05  Mg     1.8       25 Jul 2019 20:28    TPro  5.5    /  Alb  3.5    /  TBili  0.2    /  DBili  x      /  AST  17     /  ALT  11     /  AlkPhos  67     26 Jul 2019 05:05  TPro  5.6    /  Alb  3.6    /  TBili  0.2    /  DBili  x      /  AST  19     /  ALT  12     /  AlkPhos  67     25 Jul 2019 20:28    PT/INR - ( 25 Jul 2019 18:44 )   PT: 13.1 sec;   INR: 1.14 ratio         PTT - ( 25 Jul 2019 18:44 )  PTT:27.2 sec      Blood Culture:         RADIOLOGY:    EKG: sr, rbbb, lad, nsst abn

## 2019-07-26 NOTE — PROGRESS NOTE ADULT - PROBLEM SELECTOR PLAN 5
If febrile Pan Cx and CXR  Continue Ceftriaxone 2gm IV q day for 6 weeks (through 8/21 for osteomyelitis)  Continue Voriconazole for Aspergillus of face.

## 2019-07-26 NOTE — PROGRESS NOTE ADULT - PROBLEM SELECTOR PLAN 2
7/12 MUGA with EF 28%  Continue Toprol XL 12.5 mg PO q 12   Keep K>4 and Mg>2  Follows with Cardiology outpatient. 7/12 MUGA with EF 28%  Continue Toprol XL 12.5 mg PO q 12   Keep K>4 and Mg>2.  Magnesium sulphate 2gm IB x1 dose now.  Follows with Cardiology outpatient.

## 2019-07-26 NOTE — PROGRESS NOTE ADULT - SUBJECTIVE AND OBJECTIVE BOX
Podiatry Pager #: [phone]    Patient is a 59y old  Male who presents with a chief complaint of Amputation of right great toe and Cycle 1 of HiDAc (26 Jul 2019 05:55)      INTERVAL HPI/OVERNIGHT EVENTS:   Pt is scheduled for Right foot partial hallux amputation with Dr. Estrada at 1 pm.  Patient is aware of procedure and is NPO since midnight.    MEDICATIONS  (STANDING):  cefTRIAXone   IVPB 2 milliGRAM(s) IV Intermittent every 24 hours  chlorhexidine 2% Cloths 1 Application(s) Topical <User Schedule>  docusate sodium 100 milliGRAM(s) Oral three times a day  finasteride 5 milliGRAM(s) Oral daily  metoprolol succinate ER 12.5 milliGRAM(s) Oral two times a day  senna 2 Tablet(s) Oral two times a day  sertraline 50 milliGRAM(s) Oral daily  sodium chloride 0.9%. 1000 milliLiter(s) (50 mL/Hr) IV Continuous <Continuous>  voriconazole 200 milliGRAM(s) Oral every 12 hours    MEDICATIONS  (PRN):  polyethylene glycol 3350 17 Gram(s) Oral two times a day PRN Constipation      Allergies    No Known Allergies    Intolerances        Vital Signs Last 24 Hrs  T(C): 36.2 (26 Jul 2019 05:24), Max: 36.9 (25 Jul 2019 17:06)  T(F): 97.2 (26 Jul 2019 05:24), Max: 98.4 (25 Jul 2019 17:06)  HR: 75 (26 Jul 2019 05:24) (75 - 88)  BP: 116/72 (26 Jul 2019 05:24) (103/65 - 122/74)  BP(mean): --  RR: 18 (26 Jul 2019 05:24) (18 - 20)  SpO2: 93% (26 Jul 2019 05:24) (93% - 98%)    LABS:                        8.6    6.0   )-----------( 66       ( 25 Jul 2019 20:28 )             25.2     07-26    139  |  102  |  14  ----------------------------<  100<H>  4.6   |  24  |  1.46<H>    Ca    9.2      26 Jul 2019 05:05  Phos  4.4     07-26  Mg     1.9     07-26    TPro  5.5<L>  /  Alb  3.5  /  TBili  0.2  /  DBili  x   /  AST  17  /  ALT  11  /  AlkPhos  67  07-26    PT/INR - ( 25 Jul 2019 18:44 )   PT: 13.1 sec;   INR: 1.14 ratio         PTT - ( 25 Jul 2019 18:44 )  PTT:27.2 sec    CAPILLARY BLOOD GLUCOSE          RADIOLOGY & ADDITIONAL TESTS:

## 2019-07-26 NOTE — PROGRESS NOTE ADULT - ATTENDING COMMENTS
60 YO M with AML in CR admitted for right big toe amputation due to osteomyelitis. Feeling well. Xarelto held. Antibiotics to continue. Resume Xarelto in 1 -2 days. 60 YO M with AML in CR admitted for right big toe amputation due to osteomyelitis. Feeling well. Xarelto held. Antibiotics to continue. Received platelet transfusion with platelets now 110,000. Resume Xarelto in 1 -2 days.

## 2019-07-26 NOTE — PROGRESS NOTE ADULT - PROBLEM SELECTOR PLAN 6
No pharmacologic ppx 2/2 thrombocytopenia and surgical procedure in am per Podiatry  Once cleared start Heparin 5,000 units SQ q 8 and D/C if PLT <50K No pharmacologic ppx 2/2 thrombocytopenia and surgical procedure in am per Podiatry

## 2019-07-26 NOTE — PROGRESS NOTE ADULT - PROBLEM SELECTOR PLAN 4
Right toe amputation today.  Continue abx.  NPO after midnight  T&S and coags ordered  Platelet count this am - 73, transfused one unit of platelts with repeat CBC.  Cardiology clearance called, will see patient today, Dr. Lu. Right toe amputation today.  Continue abx.  NPO after midnight  T&S and coags ordered  Platelet count this am - 73, transfused one unit of platelts with repeat CBC.  Cardiology clearance called, will see patient today by Dr. Lu.

## 2019-07-26 NOTE — PRE-ANESTHESIA EVALUATION ADULT - NSANTHPMHFT_GEN_ALL_CORE
chart/cv/heme notes reviewed. aml, non-compliant mayo, factor v leiden on xeralto with hx of clots. ef~30%. pt at cv baseline per cv and pt. states et> 1 flight no cp/sob. states stress/cath wnl < 5 yrs

## 2019-07-26 NOTE — PROGRESS NOTE ADULT - SUBJECTIVE AND OBJECTIVE BOX
This patient is followed by a private cardiology team as seen on chart review, last seen July 16 th, 2019. I have notified them of this admission and the need for pre-operative optimization prior to toe amputations. Dr. Sousa will see the patient this morning and give recommendations as needed.     Broderick Villafana MD, MPH, RADHA, RPVI, [facility]  Cardiovascular Specialist   [facility]  c: [phone]  e: [email or web address]

## 2019-07-26 NOTE — CONSULT NOTE ADULT - SUBJECTIVE AND OBJECTIVE BOX
MRN-[id number]    CHIEF COMPLAINT:  Amputation of right great toe and Cycle 1 of HiDAc    HISTORY OF PRESENT ILLNESS:  DI CONTRERAS is a 59y Male patient with past medical history of ***     Allergies  No Known Allergies    PAST MEDICAL & SURGICAL HISTORY:  COLLEEN (obstructive sleep apnea)  Pulmonary embolism  Deep vein thrombosis (DVT)  H/O cardiomyopathy  Factor 5 Leiden mutation, heterozygous    FAMILY HISTORY:  Non-contributory.     SOCIAL HISTORY:    [ ] Non-smoker  [ ] Smoker  [ ] Alcohol    REVIEW OF SYSTEMS:  CONSTITUTIONAL: No fever, weight loss, or fatigue  EYES: No eye pain, visual disturbances, or discharge  ENMT:  No difficulty hearing, tinnitus, vertigo; No sinus or throat pain  NECK: No pain or stiffness  RESPIRATORY: No cough, wheezing, chills or hemoptysis; No Shortness of Breath  CARDIOVASCULAR: No chest pain, palpitations, passing out, dizziness, or leg swelling  GASTROINTESTINAL: No abdominal or epigastric pain. No nausea, vomiting, or hematemesis; No diarrhea or constipation. No melena or hematochezia.  GENITOURINARY: No dysuria, frequency, hematuria, or incontinence  NEUROLOGICAL: No headaches, memory loss, loss of strength, numbness, or tremors  SKIN: No itching, burning, rashes, or lesions   LYMPH Nodes: No enlarged glands  ENDOCRINE: No heat or cold intolerance; No hair loss  MUSCULOSKELETAL: No joint pain or swelling; No muscle, back, or extremity pain  PSYCHIATRIC: No depression, anxiety, mood swings, or difficulty sleeping  HEME/LYMPH: No easy bruising, or bleeding gums    I&O's Summary    25 Jul 2019 07:01  -  26 Jul 2019 05:55  --------------------------------------------------------  IN: 480 mL / OUT: 500 mL / NET: -20 mL    PHYSICAL EXAM:  Vital Signs Last 24 Hrs  T(C): 36.2 (26 Jul 2019 05:24), Max: 36.9 (25 Jul 2019 17:06)  T(F): 97.2 (26 Jul 2019 05:24), Max: 98.4 (25 Jul 2019 17:06)  HR: 75 (26 Jul 2019 05:24) (75 - 88)  BP: 116/72 (26 Jul 2019 05:24) (103/65 - 122/74)  RR: 18 (26 Jul 2019 05:24) (18 - 20)  SpO2: 93% (26 Jul 2019 05:24) (93% - 98%)    Appearance: Normal	  HEENT:   Normal oral mucosa, PERRL, EOMI	  Lymphatic: No lymphadenopathy  Cardiovascular: Normal S1 S2, No JVD, No murmurs, No edema  Respiratory: Lungs clear to auscultation	  Psychiatry: A & O x 3, Mood & affect appropriate  Gastrointestinal:  Soft, Non-tender, + BS	  Skin: No rashes, No ecchymoses, No cyanosis	  Neurologic: Non-focal  Extremities: Normal range of motion, No clubbing, cyanosis or edema  Vascular: Peripheral pulses palpable 2+ bilaterally    MEDICATIONS:  MEDICATIONS  (STANDING):  cefTRIAXone   IVPB 2 milliGRAM(s) IV Intermittent every 24 hours  chlorhexidine 2% Cloths 1 Application(s) Topical <User Schedule>  docusate sodium 100 milliGRAM(s) Oral three times a day  finasteride 5 milliGRAM(s) Oral daily  metoprolol succinate ER 12.5 milliGRAM(s) Oral two times a day  senna 2 Tablet(s) Oral two times a day  sertraline 50 milliGRAM(s) Oral daily  sodium chloride 0.9%. 1000 milliLiter(s) (50 mL/Hr) IV Continuous <Continuous>  voriconazole 200 milliGRAM(s) Oral every 12 hours    MEDICATIONS  (PRN):  polyethylene glycol 3350 17 Gram(s) Oral two times a day PRN Constipation    LABS:	 	  CBC Full  -  ( 25 Jul 2019 20:28 )  WBC Count : 6.0 K/uL  Hemoglobin : 8.6 g/dL  Hematocrit : 25.2 %  Platelet Count - Automated : 66 K/uL  Mean Cell Volume : 99.3 fl  Mean Cell Hemoglobin : 33.9 pg  Mean Cell Hemoglobin Concentration : 34.1 gm/dL  Auto Neutrophil # : See Note  Auto Lymphocyte # : See Note  Auto Monocyte # : See Note  Auto Eosinophil # : x  Auto Basophil # : x  Auto Neutrophil % : 54.0 %  Auto Lymphocyte % : 44.0 %  Auto Monocyte % : 2.0 %    07-26    139  |  102  |  14  ----------------------------<  100<H>  4.6   |  24  |  1.46<H>  07-25    139  |  101  |  12  ----------------------------<  124<H>  4.4   |  24  |  1.31<H>    Ca    9.2      26 Jul 2019 05:05  Ca    9.3      25 Jul 2019 20:28  Phos  4.4     07-26  Phos  3.7     07-25  Mg     1.9     07-26  Mg     1.8     07-25    TPro  5.5<L>  /  Alb  3.5  /  TBili  0.2  /  DBili  x   /  AST  17  /  ALT  11  /  AlkPhos  67  07-26  TPro  5.6<L>  /  Alb  3.6  /  TBili  0.2  /  DBili  x   /  AST  19  /  ALT  12  /  AlkPhos  67  07-25    PT/INR - ( 25 Jul 2019 18:44 )   PT: 13.1 sec;   INR: 1.14 ratio      PTT - ( 25 Jul 2019 18:44 )  PTT:27.2 sec    proBNP:   Lipid Profile:   HgA1c:   TSH:     RADIOLOGY:    CARDIAC TESTING/STUDIES:    Echocardiogram:  Catheterization:  Stress Test:  	  TELEMETRY: 	    ECG:  	    ASSESSMENT/PLAN: 	      Broderick Villafana MD, MPH, RADHA  Cardiovascular Specialist Attending  [facility]  C: [phone]  E: [email or web address]  (Cardiology Nocturnist cell number available 7 pm - 7 am every night; available daytime week days for follow-up only; daytime weekends covered by general cardiology consult service)

## 2019-07-26 NOTE — CONSULT NOTE ADULT - ASSESSMENT
Mr. Delong is a 59 year old male with AML previously on anthracycline based chemotherapy with a reported EF in the 20's at this time, who was recently admitted for chemotherapy and a toe infection, now returns for amputation of right toe due to osteomyelitis followed by consolidation chemotherapy with cycle 1 HiDAc.    Recently, he had an echocardiogram in our office 7/2019 with mild MR, eccentric LVH with moderate LV dysfunction (EF 35-40%), and mild aortic root dilatation. MUGA with Dilated left ventricle with generalized hypokinesia and EF 28 %, slightly lower than the echocardiogram.  He also has a history of Factor V Leiden and VTE, and has been on Xarelto.    - No sign of acute ischemia, nor does he have any chest pain or difficulty breathing. His last EKG demonstrates a RBBB, LAD, RVH and non-specific st abn. Please repeat one today. He reports a cath about 5 years ago at [facility] that was unremarkable and a stress test more recently, though results of this are unavailable.  - No sign of volume overload. He seems to be tolerating Toprol XL 12.5 bid which can be continued.  - Because of mild NAGA, we held off on ace-inhibitor, especially in the setting of borderline BP. Ideally, we would start him on lisinopril 2.5 mg po daily, though his creatinine will have to be at baseline.  - Hold Xarelto in setting of preparation for toe amputation.  - Watch creatinine and electrolytes. Keep K>4, Mg>2  - No cardiac contraindication to proceeding with toe amputation. Routine cardiac monitoring is recommended.  - Will follow with you.

## 2019-07-26 NOTE — PROGRESS NOTE ADULT - ASSESSMENT
Plan:   To OR today for Right foot partial hallux amputation with Dr. Estrada at 1 pm.   Awaiting cards clearance   Procedure was explained to patient in detail. All alternatives, risks and complications were discussed. All questions answered.

## 2019-07-26 NOTE — PROGRESS NOTE ADULT - ASSESSMENT
This is a 60yo M with PMHx of HFrEF (recent EF 28%), COLLEEN, DVT/PE, HTN, Factor V Leiden and AML diagnosed in 4/2019 s/p induction chemotherapy now in morphologic remission admitted for amputation of right great toe due to osteomyelitis to be followed by consolidation chemotherapy with cycle 1 HiDAc.

## 2019-07-26 NOTE — BRIEF OPERATIVE NOTE - SPECIMENS
1. clean margin bone of right proximal phalanx of hallux sent to microbiology 2. dirty skin, soft tissue and distal phalanx of right great toe sent to pathology 3. clean margin of proximal hallux of right great toe

## 2019-07-26 NOTE — PROGRESS NOTE ADULT - PROBLEM SELECTOR PLAN 1
Monitor CBC with diff.  Tranfuse PRN.  Monitor electrolytes.  Supplement as needed.  Strict I/O.  Daily weights.  Plan to start chemo on Monday (7/29), HIDAC. Monitor CBC with diff.  Transfuse PRN.  Monitor electrolytes.  Supplement as needed.  Strict I/O.  Daily weights.  Plan to start chemo on Monday (7/29), HIDAC.

## 2019-07-27 ENCOUNTER — RESULT REVIEW (OUTPATIENT)
Age: 59
End: 2019-07-27

## 2019-07-27 LAB
ALBUMIN SERPL ELPH-MCNC: 3.9 G/DL — SIGNIFICANT CHANGE UP (ref 3.3–5)
ALP SERPL-CCNC: 70 U/L — SIGNIFICANT CHANGE UP (ref 40–120)
ALT FLD-CCNC: 13 U/L — SIGNIFICANT CHANGE UP (ref 10–45)
ANION GAP SERPL CALC-SCNC: 18 MMOL/L — HIGH (ref 5–17)
AST SERPL-CCNC: 18 U/L — SIGNIFICANT CHANGE UP (ref 10–40)
BASOPHILS # BLD AUTO: 0 K/UL — SIGNIFICANT CHANGE UP (ref 0–0.2)
BASOPHILS NFR BLD AUTO: 0.1 % — SIGNIFICANT CHANGE UP (ref 0–2)
BILIRUB SERPL-MCNC: 0.2 MG/DL — SIGNIFICANT CHANGE UP (ref 0.2–1.2)
BUN SERPL-MCNC: 16 MG/DL — SIGNIFICANT CHANGE UP (ref 7–23)
CALCIUM SERPL-MCNC: 9.1 MG/DL — SIGNIFICANT CHANGE UP (ref 8.4–10.5)
CHLORIDE SERPL-SCNC: 98 MMOL/L — SIGNIFICANT CHANGE UP (ref 96–108)
CO2 SERPL-SCNC: 21 MMOL/L — LOW (ref 22–31)
CREAT SERPL-MCNC: 1.22 MG/DL — SIGNIFICANT CHANGE UP (ref 0.5–1.3)
EOSINOPHIL # BLD AUTO: 0 K/UL — SIGNIFICANT CHANGE UP (ref 0–0.5)
EOSINOPHIL NFR BLD AUTO: 0.5 % — SIGNIFICANT CHANGE UP (ref 0–6)
GLUCOSE SERPL-MCNC: 118 MG/DL — HIGH (ref 70–99)
GRAM STN FLD: SIGNIFICANT CHANGE UP
HCT VFR BLD CALC: 25.9 % — LOW (ref 39–50)
HGB BLD-MCNC: 8.7 G/DL — LOW (ref 13–17)
LYMPHOCYTES # BLD AUTO: 1.9 K/UL — SIGNIFICANT CHANGE UP (ref 1–3.3)
LYMPHOCYTES # BLD AUTO: 19.3 % — SIGNIFICANT CHANGE UP (ref 13–44)
MAGNESIUM SERPL-MCNC: 2 MG/DL — SIGNIFICANT CHANGE UP (ref 1.6–2.6)
MCHC RBC-ENTMCNC: 33.5 PG — SIGNIFICANT CHANGE UP (ref 27–34)
MCHC RBC-ENTMCNC: 33.8 GM/DL — SIGNIFICANT CHANGE UP (ref 32–36)
MCV RBC AUTO: 99.2 FL — SIGNIFICANT CHANGE UP (ref 80–100)
MONOCYTES # BLD AUTO: 0.4 K/UL — SIGNIFICANT CHANGE UP (ref 0–0.9)
MONOCYTES NFR BLD AUTO: 4.2 % — SIGNIFICANT CHANGE UP (ref 2–14)
NEUTROPHILS # BLD AUTO: 7.5 K/UL — HIGH (ref 1.8–7.4)
NEUTROPHILS NFR BLD AUTO: 75.9 % — SIGNIFICANT CHANGE UP (ref 43–77)
PHOSPHATE SERPL-MCNC: 4 MG/DL — SIGNIFICANT CHANGE UP (ref 2.5–4.5)
PLATELET # BLD AUTO: 106 K/UL — LOW (ref 150–400)
POTASSIUM SERPL-MCNC: 5 MMOL/L — SIGNIFICANT CHANGE UP (ref 3.5–5.3)
POTASSIUM SERPL-SCNC: 5 MMOL/L — SIGNIFICANT CHANGE UP (ref 3.5–5.3)
PROT SERPL-MCNC: 6 G/DL — SIGNIFICANT CHANGE UP (ref 6–8.3)
RBC # BLD: 2.61 M/UL — LOW (ref 4.2–5.8)
RBC # FLD: 16 % — HIGH (ref 10.3–14.5)
SODIUM SERPL-SCNC: 137 MMOL/L — SIGNIFICANT CHANGE UP (ref 135–145)
SPECIMEN SOURCE: SIGNIFICANT CHANGE UP
WBC # BLD: 9.9 K/UL — SIGNIFICANT CHANGE UP (ref 3.8–10.5)
WBC # FLD AUTO: 9.9 K/UL — SIGNIFICANT CHANGE UP (ref 3.8–10.5)

## 2019-07-27 PROCEDURE — 99232 SBSQ HOSP IP/OBS MODERATE 35: CPT

## 2019-07-27 PROCEDURE — 99233 SBSQ HOSP IP/OBS HIGH 50: CPT

## 2019-07-27 RX ORDER — NICOTINE POLACRILEX 2 MG
1 GUM BUCCAL DAILY
Refills: 0 | Status: DISCONTINUED | OUTPATIENT
Start: 2019-07-27 | End: 2019-08-03

## 2019-07-27 RX ORDER — CEFTRIAXONE 500 MG/1
2000 INJECTION, POWDER, FOR SOLUTION INTRAMUSCULAR; INTRAVENOUS EVERY 24 HOURS
Refills: 0 | Status: DISCONTINUED | OUTPATIENT
Start: 2019-07-27 | End: 2019-08-02

## 2019-07-27 RX ORDER — RIVAROXABAN 15 MG-20MG
20 KIT ORAL
Refills: 0 | Status: DISCONTINUED | OUTPATIENT
Start: 2019-07-27 | End: 2019-08-03

## 2019-07-27 RX ADMIN — CHLORHEXIDINE GLUCONATE 1 APPLICATION(S): 213 SOLUTION TOPICAL at 05:08

## 2019-07-27 RX ADMIN — VORICONAZOLE 200 MILLIGRAM(S): 10 INJECTION, POWDER, LYOPHILIZED, FOR SOLUTION INTRAVENOUS at 05:06

## 2019-07-27 RX ADMIN — FINASTERIDE 5 MILLIGRAM(S): 5 TABLET, FILM COATED ORAL at 11:26

## 2019-07-27 RX ADMIN — Medication 100 MILLIGRAM(S): at 21:49

## 2019-07-27 RX ADMIN — Medication 100 MILLIGRAM(S): at 05:05

## 2019-07-27 RX ADMIN — Medication 12.5 MILLIGRAM(S): at 17:21

## 2019-07-27 RX ADMIN — OXYCODONE AND ACETAMINOPHEN 1 TABLET(S): 5; 325 TABLET ORAL at 18:24

## 2019-07-27 RX ADMIN — Medication 12.5 MILLIGRAM(S): at 05:06

## 2019-07-27 RX ADMIN — CEFTRIAXONE 100 MILLIGRAM(S): 500 INJECTION, POWDER, FOR SOLUTION INTRAMUSCULAR; INTRAVENOUS at 21:49

## 2019-07-27 RX ADMIN — SERTRALINE 50 MILLIGRAM(S): 25 TABLET, FILM COATED ORAL at 11:26

## 2019-07-27 RX ADMIN — Medication 100 MILLIGRAM(S): at 13:21

## 2019-07-27 RX ADMIN — OXYCODONE AND ACETAMINOPHEN 1 TABLET(S): 5; 325 TABLET ORAL at 19:10

## 2019-07-27 RX ADMIN — SENNA PLUS 2 TABLET(S): 8.6 TABLET ORAL at 05:07

## 2019-07-27 RX ADMIN — Medication 1 PATCH: at 17:50

## 2019-07-27 RX ADMIN — OXYCODONE AND ACETAMINOPHEN 1 TABLET(S): 5; 325 TABLET ORAL at 12:52

## 2019-07-27 RX ADMIN — OXYCODONE AND ACETAMINOPHEN 1 TABLET(S): 5; 325 TABLET ORAL at 13:30

## 2019-07-27 RX ADMIN — SENNA PLUS 2 TABLET(S): 8.6 TABLET ORAL at 17:21

## 2019-07-27 RX ADMIN — RIVAROXABAN 20 MILLIGRAM(S): KIT at 17:50

## 2019-07-27 RX ADMIN — VORICONAZOLE 200 MILLIGRAM(S): 10 INJECTION, POWDER, LYOPHILIZED, FOR SOLUTION INTRAVENOUS at 17:22

## 2019-07-27 NOTE — PROGRESS NOTE ADULT - ASSESSMENT
This is a 58yo M with PMHx of HFrEF (recent EF 28%), COLLEEN, DVT/PE, HTN, Factor V Leiden and AML diagnosed in 4/2019 s/p induction chemotherapy now in morphologic remission admitted for amputation of right great toe due to osteomyelitis to be followed by consolidation chemotherapy with cycle 1 HiDAc.

## 2019-07-27 NOTE — PROGRESS NOTE ADULT - SUBJECTIVE AND OBJECTIVE BOX
Patient is a 59y old  Male who presents with a chief complaint of Amputation of right great toe and Cycle 1 of HiDAc (27 Jul 2019 07:32)       INTERVAL HPI/OVERNIGHT EVENTS:  Patient seen and evaluated at bedside.  Pt is resting comfortable in NAD. Denies N/V/F/C    Allergies    No Known Allergies    Intolerances        Vital Signs Last 24 Hrs  T(C): 36.2 (27 Jul 2019 09:37), Max: 36.4 (26 Jul 2019 12:48)  T(F): 97.1 (27 Jul 2019 09:37), Max: 97.5 (26 Jul 2019 12:48)  HR: 72 (27 Jul 2019 09:37) (72 - 99)  BP: 106/67 (27 Jul 2019 09:37) (96/59 - 121/80)  BP(mean): 72 (26 Jul 2019 15:15) (72 - 83)  RR: 18 (27 Jul 2019 09:37) (14 - 18)  SpO2: 95% (27 Jul 2019 09:37) (93% - 100%)    LABS:                        8.7    9.9   )-----------( 106      ( 27 Jul 2019 07:00 )             25.9     07-27    137  |  98  |  16  ----------------------------<  118<H>  5.0   |  21<L>  |  1.22    Ca    9.1      27 Jul 2019 06:58  Phos  4.0     07-27  Mg     2.0     07-27    TPro  6.0  /  Alb  3.9  /  TBili  0.2  /  DBili  x   /  AST  18  /  ALT  13  /  AlkPhos  70  07-27    PT/INR - ( 26 Jul 2019 10:03 )   PT: 11.7 sec;   INR: 1.03 ratio         PTT - ( 26 Jul 2019 10:03 )  PTT:26.3 sec    CAPILLARY BLOOD GLUCOSE          Lower Extremity Physical Exam:  s/p Right foot partial hallux amp closed, sutures intact, no signs of dehiscence or necrosis, no hematoma, flap warm to touch with good CFT     RADIOLOGY & ADDITIONAL TESTS:

## 2019-07-27 NOTE — PROGRESS NOTE ADULT - SUBJECTIVE AND OBJECTIVE BOX
[facility] - Ede Zamudio, Riki, Edgar, Gem, Tabitha Garcia  Office Number:  [phone]    Patient resting comfortably in bed in NAD.  Laying flat with no respiratory distress.  No complaints of chest pain, dyspnea, palpitations, PND, or orthopnea.    F/U for:  Cardiomyopathy    MEDICATIONS  (STANDING):  cefTRIAXone   IVPB 2 milliGRAM(s) IV Intermittent every 24 hours  chlorhexidine 2% Cloths 1 Application(s) Topical <User Schedule>  docusate sodium 100 milliGRAM(s) Oral three times a day  finasteride 5 milliGRAM(s) Oral daily  metoprolol succinate ER 12.5 milliGRAM(s) Oral two times a day  senna 2 Tablet(s) Oral two times a day  sertraline 50 milliGRAM(s) Oral daily  sodium chloride 0.9%. 1000 milliLiter(s) (40 mL/Hr) IV Continuous <Continuous>  voriconazole 200 milliGRAM(s) Oral every 12 hours    MEDICATIONS  (PRN):  acetaminophen   Tablet .. 650 milliGRAM(s) Oral every 6 hours PRN Mild Pain (1 - 3)  oxyCODONE    5 mG/acetaminophen 325 mG 1 Tablet(s) Oral every 4 hours PRN Moderate Pain (4 - 6)  polyethylene glycol 3350 17 Gram(s) Oral two times a day PRN Constipation      Allergies    No Known Allergies    Intolerances        Vital Signs Last 24 Hrs  T(C): 35.8 (27 Jul 2019 04:59), Max: 36.4 (26 Jul 2019 12:48)  T(F): 96.5 (27 Jul 2019 04:59), Max: 97.5 (26 Jul 2019 12:48)  HR: 97 (27 Jul 2019 04:59) (76 - 99)  BP: 105/65 (27 Jul 2019 04:59) (96/59 - 121/80)  BP(mean): 72 (26 Jul 2019 15:15) (72 - 83)  RR: 18 (27 Jul 2019 04:59) (14 - 18)  SpO2: 97% (27 Jul 2019 04:59) (93% - 100%)    I&O's Summary    26 Jul 2019 07:01  -  27 Jul 2019 07:00  --------------------------------------------------------  IN: 800 mL / OUT: 2200 mL / NET: -1400 mL        ON EXAM:    Constitutional: NAD, alert and oriented x 3  Lungs:  Non-labored, breath sounds are clear bilaterally, No wheezing, rales or rhonchi  Cardiovascular: RRR.  S1 and S2 positive.  No murmurs, rubs, gallops or clicks  Gastrointestinal: Bowel Sounds present, soft, nontender.   Lymph: No peripheral edema; right foot wrapped. No cervical lymphadenopathy.  Neurological: Alert, no focal deficits  Skin: No rashes or ulcers   Psych:  Mood & affect appropriate.  LABS: All Labs Reviewed:                        9.1    6.9   )-----------( 110      ( 26 Jul 2019 09:36 )             27.1                         9.0    6.3   )-----------( 73       ( 26 Jul 2019 05:51 )             25.2                         8.6    6.0   )-----------( 66       ( 25 Jul 2019 20:28 )             25.2     26 Jul 2019 05:05    139    |  102    |  14     ----------------------------<  100    4.6     |  24     |  1.46   25 Jul 2019 20:28    139    |  101    |  12     ----------------------------<  124    4.4     |  24     |  1.31     Ca    9.2        26 Jul 2019 05:05  Ca    9.3        25 Jul 2019 20:28  Phos  4.4       26 Jul 2019 05:05  Phos  3.7       25 Jul 2019 20:28  Mg     1.9       26 Jul 2019 05:05  Mg     1.8       25 Jul 2019 20:28    TPro  5.5    /  Alb  3.5    /  TBili  0.2    /  DBili  x      /  AST  17     /  ALT  11     /  AlkPhos  67     26 Jul 2019 05:05  TPro  5.6    /  Alb  3.6    /  TBili  0.2    /  DBili  x      /  AST  19     /  ALT  12     /  AlkPhos  67     25 Jul 2019 20:28    PT/INR - ( 26 Jul 2019 10:03 )   PT: 11.7 sec;   INR: 1.03 ratio         PTT - ( 26 Jul 2019 10:03 )  PTT:26.3 sec      Blood Culture: Organism --  Gram Stain Blood -- Gram Stain   Rare polymorphonuclear leukocytes seen per low power field  No organisms seen per oil power field  Specimen Source .Tissue CLEAR MARGIN BONE, RIGHT  Culture-Blood --

## 2019-07-27 NOTE — PROGRESS NOTE ADULT - SUBJECTIVE AND OBJECTIVE BOX
Diagnosis:    Protocol/Chemo Regimen:    Day:     Pt endorsed:    Review of Systems:     Pain scale:     Diet:     Allergies    No Known Allergies    Intolerances        ANTIMICROBIALS  cefTRIAXone   IVPB 2 milliGRAM(s) IV Intermittent every 24 hours  voriconazole 200 milliGRAM(s) Oral every 12 hours      HEME/ONC MEDICATIONS      STANDING MEDICATIONS  chlorhexidine 2% Cloths 1 Application(s) Topical <User Schedule>  docusate sodium 100 milliGRAM(s) Oral three times a day  finasteride 5 milliGRAM(s) Oral daily  metoprolol succinate ER 12.5 milliGRAM(s) Oral two times a day  senna 2 Tablet(s) Oral two times a day  sertraline 50 milliGRAM(s) Oral daily  sodium chloride 0.9%. 1000 milliLiter(s) IV Continuous <Continuous>      PRN MEDICATIONS  acetaminophen   Tablet .. 650 milliGRAM(s) Oral every 6 hours PRN  oxyCODONE    5 mG/acetaminophen 325 mG 1 Tablet(s) Oral every 4 hours PRN  polyethylene glycol 3350 17 Gram(s) Oral two times a day PRN        Vital Signs Last 24 Hrs  T(C): 35.8 (27 Jul 2019 04:59), Max: 36.4 (26 Jul 2019 12:48)  T(F): 96.5 (27 Jul 2019 04:59), Max: 97.5 (26 Jul 2019 12:48)  HR: 97 (27 Jul 2019 04:59) (76 - 99)  BP: 105/65 (27 Jul 2019 04:59) (96/59 - 121/80)  BP(mean): 72 (26 Jul 2019 15:15) (72 - 83)  RR: 18 (27 Jul 2019 04:59) (14 - 18)  SpO2: 97% (27 Jul 2019 04:59) (93% - 100%)    PHYSICAL EXAM  General: NAD  HEENT: PERRLA, EOMOI, clear oropharynx, anicteric sclera, pink conjunctiva  Neck: supple  CV: (+) S1/S2 RRR  Lungs: clear to auscultation, no wheezes or rales  Abdomen: soft, non-tender, non-distended (+) BS  Ext: no clubbing, cyanosis or edema  Skin: no rashes and no petechiae  Neuro: alert and oriented X 3, no focal deficits  Central Line:     RECENT CULTURES:  07-26 @ 23:06  .Tissue CLEAR MARGIN BONE, RIGHT  --  --  --  --  --        LABS:                        9.1    6.9   )-----------( 110      ( 26 Jul 2019 09:36 )             27.1         Mean Cell Volume : 97.6 fl  Mean Cell Hemoglobin : 32.8 pg  Mean Cell Hemoglobin Concentration : 33.6 gm/dL  Auto Neutrophil # : 4.0 K/uL  Auto Lymphocyte # : 2.2 K/uL  Auto Monocyte # : 0.7 K/uL  Auto Eosinophil # : 0.1 K/uL  Auto Basophil # : 0.0 K/uL  Auto Neutrophil % : 57.2 %  Auto Lymphocyte % : 31.4 %  Auto Monocyte % : 9.8 %  Auto Eosinophil % : 1.4 %  Auto Basophil % : 0.2 %      07-26    139  |  102  |  14  ----------------------------<  100<H>  4.6   |  24  |  1.46<H>    Ca    9.2      26 Jul 2019 05:05  Phos  4.4     07-26  Mg     1.9     07-26    TPro  5.5<L>  /  Alb  3.5  /  TBili  0.2  /  DBili  x   /  AST  17  /  ALT  11  /  AlkPhos  67  07-26          PT/INR - ( 26 Jul 2019 10:03 )   PT: 11.7 sec;   INR: 1.03 ratio         PTT - ( 26 Jul 2019 10:03 )  PTT:26.3 sec        RADIOLOGY & ADDITIONAL STUDIES: Diagnosis: AML/ right toe amputation    Protocol/Chemo Regimen: Cycle # 1 HIDAC (will start on Monday).    Pt endorsed: Feeling well this am.    Review of Systems: Denies any chest pain, palpitation, SOB, abdominal pain, nausea, vomiting or diarrhea.    Pain scale: Denies    Diet: Regular    Allergies: No Known Allergies    ANTIMICROBIALS  cefTRIAXone   IVPB 2 milliGRAM(s) IV Intermittent every 24 hours  voriconazole 200 milliGRAM(s) Oral every 12 hours    STANDING MEDICATIONS  chlorhexidine 2% Cloths 1 Application(s) Topical <User Schedule>  docusate sodium 100 milliGRAM(s) Oral three times a day  finasteride 5 milliGRAM(s) Oral daily  metoprolol succinate ER 12.5 milliGRAM(s) Oral two times a day  senna 2 Tablet(s) Oral two times a day  sertraline 50 milliGRAM(s) Oral daily  sodium chloride 0.9%. 1000 milliLiter(s) IV Continuous <Continuous>      PRN MEDICATIONS  acetaminophen   Tablet .. 650 milliGRAM(s) Oral every 6 hours PRN  oxyCODONE    5 mG/acetaminophen 325 mG 1 Tablet(s) Oral every 4 hours PRN  polyethylene glycol 3350 17 Gram(s) Oral two times a day PRN        Vital Signs Last 24 Hrs  T(C): 35.8 (27 Jul 2019 04:59), Max: 36.4 (26 Jul 2019 12:48)  T(F): 96.5 (27 Jul 2019 04:59), Max: 97.5 (26 Jul 2019 12:48)  HR: 97 (27 Jul 2019 04:59) (76 - 99)  BP: 105/65 (27 Jul 2019 04:59) (96/59 - 121/80)  BP(mean): 72 (26 Jul 2019 15:15) (72 - 83)  RR: 18 (27 Jul 2019 04:59) (14 - 18)  SpO2: 97% (27 Jul 2019 04:59) (93% - 100%)    PHYSICAL EXAM  General: NAD  HEENT: PERRLA, EOMOI, clear oropharynx, anicteric sclera, pink conjunctiva  Neck: supple  CV: (+) S1/S2 RRR  Lungs: clear to auscultation, no wheezes or rales  Abdomen: soft, non-tender, non-distended (+) BS  Ext: s/p right toe amputation, dressing +, left leg no edema noted.  Skin: no rashes and no petechiae  Neuro: alert and oriented X 3, no focal deficits  Central Line: Right arm D/L PICC line, CDI.    RECENT CULTURES:  07-26 @ 23:06  .Tissue CLEAR MARGIN BONE, RIGHT    LABS:                        9.1    6.9   )-----------( 110      ( 26 Jul 2019 09:36 )             27.1         Mean Cell Volume : 97.6 fl  Mean Cell Hemoglobin : 32.8 pg  Mean Cell Hemoglobin Concentration : 33.6 gm/dL  Auto Neutrophil # : 4.0 K/uL  Auto Lymphocyte # : 2.2 K/uL  Auto Monocyte # : 0.7 K/uL  Auto Eosinophil # : 0.1 K/uL  Auto Basophil # : 0.0 K/uL  Auto Neutrophil % : 57.2 %  Auto Lymphocyte % : 31.4 %  Auto Monocyte % : 9.8 %  Auto Eosinophil % : 1.4 %  Auto Basophil % : 0.2 %      07-26    139  |  102  |  14  ----------------------------<  100<H>  4.6   |  24  |  1.46<H>    Ca    9.2      26 Jul 2019 05:05  Phos  4.4     07-26  Mg     1.9     07-26    TPro  5.5<L>  /  Alb  3.5  /  TBili  0.2  /  DBili  x   /  AST  17  /  ALT  11  /  AlkPhos  67  07-26          PT/INR - ( 26 Jul 2019 10:03 )   PT: 11.7 sec;   INR: 1.03 ratio         PTT - ( 26 Jul 2019 10:03 )  PTT:26.3 sec        RADIOLOGY & ADDITIONAL STUDIES: Diagnosis: AML/ right toe amputation    Protocol/Chemo Regimen: Cycle # 1 HIDAC (will start on Monday).    Pt endorsed: Feeling well this am.    Review of Systems: Denies any chest pain, palpitation, SOB, abdominal pain, nausea, vomiting or diarrhea.    Pain scale: Denies    Diet: Regular    Allergies: No Known Allergies    ANTIMICROBIALS  cefTRIAXone   IVPB 2 milliGRAM(s) IV Intermittent every 24 hours  voriconazole 200 milliGRAM(s) Oral every 12 hours    STANDING MEDICATIONS  chlorhexidine 2% Cloths 1 Application(s) Topical <User Schedule>  docusate sodium 100 milliGRAM(s) Oral three times a day  finasteride 5 milliGRAM(s) Oral daily  metoprolol succinate ER 12.5 milliGRAM(s) Oral two times a day  senna 2 Tablet(s) Oral two times a day  sertraline 50 milliGRAM(s) Oral daily  sodium chloride 0.9%. 1000 milliLiter(s) IV Continuous <Continuous>    PRN MEDICATIONS  acetaminophen   Tablet .. 650 milliGRAM(s) Oral every 6 hours PRN  oxyCODONE    5 mG/acetaminophen 325 mG 1 Tablet(s) Oral every 4 hours PRN  polyethylene glycol 3350 17 Gram(s) Oral two times a day PRN    Vital Signs Last 24 Hrs  T(C): 35.8 (27 Jul 2019 04:59), Max: 36.4 (26 Jul 2019 12:48)  T(F): 96.5 (27 Jul 2019 04:59), Max: 97.5 (26 Jul 2019 12:48)  HR: 97 (27 Jul 2019 04:59) (76 - 99)  BP: 105/65 (27 Jul 2019 04:59) (96/59 - 121/80)  BP(mean): 72 (26 Jul 2019 15:15) (72 - 83)  RR: 18 (27 Jul 2019 04:59) (14 - 18)  SpO2: 97% (27 Jul 2019 04:59) (93% - 100%)    PHYSICAL EXAM  General: NAD  HEENT: PERRLA, EOMOI, clear oropharynx, anicteric sclera, pink conjunctiva  Neck: supple  CV: (+) S1/S2 RRR  Lungs: clear to auscultation, no wheezes or rales  Abdomen: soft, non-tender, non-distended (+) BS  Ext: s/p right toe amputation, dressing +, left leg no edema noted.  Skin: no rashes and no petechiae  Neuro: alert and oriented X 3, no focal deficits  Central Line: Right arm D/L PICC line, CDI.    RECENT CULTURES:  07-26 @ 23:06  .Tissue CLEAR MARGIN BONE, RIGHT    LABS:               8.7    9.9   )-----------( 106      ( 27 Jul 2019 07:00 )             25.9         Mean Cell Volume : 99.2 fl  Mean Cell Hemoglobin : 33.5 pg  Mean Cell Hemoglobin Concentration : 33.8 gm/dL  Auto Neutrophil # : 7.5 K/uL  Auto Lymphocyte # : 1.9 K/uL  Auto Monocyte # : 0.4 K/uL  Auto Eosinophil # : 0.0 K/uL  Auto Basophil # : 0.0 K/uL  Auto Neutrophil % : 75.9 %  Auto Lymphocyte % : 19.3 %  Auto Monocyte % : 4.2 %  Auto Eosinophil % : 0.5 %  Auto Basophil % : 0.1 %      07-27    137  |  98  |  16  ----------------------------<  118<H>  5.0   |  21<L>  |  1.22    Ca    9.1      27 Jul 2019 06:58  Phos  4.0     07-27  Mg     2.0     07-27    TPro  6.0  /  Alb  3.9  /  TBili  0.2  /  DBili  x   /  AST  18  /  ALT  13  /  AlkPhos  70  07-27  Mg 2.0  Phos 4.0  PT/INR - ( 26 Jul 2019 10:03 )   PT: 11.7 sec;   INR: 1.03 ratio    PTT - ( 26 Jul 2019 10:03 )  PTT:26.3 sec    RADIOLOGY & ADDITIONAL STUDIES:   Xray Foot AP + Lateral + Oblique, Right (07.26.19 @ 15:01) >  Patient is status post amputation of the first toe to the level of the   mid shaft of the first proximal phalanx. Surgical margins are sharp.   There is surrounding soft tissue edema. The joint spaces are preserved.   There is atherosclerotic disease.

## 2019-07-27 NOTE — PROGRESS NOTE ADULT - PROBLEM SELECTOR PLAN 1
Monitor CBC with diff.  Transfuse PRN.  Monitor electrolytes.  Supplement as needed.  Strict I/O.  Daily weights.  Plan to start chemo on Monday (7/29), HIDAC.

## 2019-07-27 NOTE — PROGRESS NOTE ADULT - ATTENDING COMMENTS
58 YO M with AML in CR admitted for right big toe amputation due to osteomyelitis. Feeling well. Xarelto held. Antibiotics to continue. Received platelet transfusion with platelets now 110,000. Resume Xarelto in 1 -2 days. 58yo M with AML in CR admitted for right big toe amputation due to osteomyelitis.   Podiatry following -weight bear as tolerated in surgical shoe and keep dressing clean dry and intact   Resume William  Plan to start HIDAC on 7/29

## 2019-07-27 NOTE — PROGRESS NOTE ADULT - PROBLEM SELECTOR PLAN 2
7/12 MUGA with EF 28%  Continue Toprol XL 12.5 mg PO q 12   Keep K>4 and Mg>2.  Magnesium sulphate 2gm IB x1 dose now.  Follows with Cardiology outpatient. 7/12 MUGA with EF 28%  Continue Toprol XL 12.5 mg PO q 12   Keep K>4 and Mg>2.  Start Xarelto 20mg today as per Podiatry recom.  Follows with Cardiology outpatient.

## 2019-07-27 NOTE — PROGRESS NOTE ADULT - PROBLEM SELECTOR PLAN 4
Right toe amputation today.  Continue abx.  NPO after midnight  T&S and coags ordered  Platelet count this am - 73, transfused one unit of platelts with repeat CBC.  Cardiology clearance called, will see patient today by Dr. Lu. Right toe amputation today.  Continue abx.  S/p right toe amputation, continue antibiotics.

## 2019-07-27 NOTE — PROGRESS NOTE ADULT - PROBLEM SELECTOR PLAN 3
With previous DVT and PE   Xarelto on hold for toe amputation today.  Will discuss with Podiatry when AC can restart   Hold when PLT <50K. With previous DVT and PE   Xarelto was on hold for toe amputation, restarted today.  Hold when PLT <50K.

## 2019-07-27 NOTE — PROGRESS NOTE ADULT - ASSESSMENT
Mr. Delong is a 59 year old male with AML previously on anthracycline based chemotherapy with a reported EF in the 20's at this time, who was recently admitted for chemotherapy and a toe infection, now returns for amputation of right toe due to osteomyelitis followed by consolidation chemotherapy with cycle 1 HiDAc.    Recently, he had an echocardiogram in our office 7/2019 with mild MR, eccentric LVH with moderate LV dysfunction (EF 35-40%), and mild aortic root dilatation. MUGA with Dilated left ventricle with generalized hypokinesia and EF 28 %, slightly lower than the echocardiogram.  He also has a history of Factor V Leiden and VTE, and has been on Xarelto.    - No sign of acute ischemia, nor does he have any chest pain or difficulty breathing. His last EKG demonstrates a RBBB, LAD, RVH and non-specific st abn.  He reports a cath about 5 years ago at [facility] that was unremarkable and a stress test more recently, though results of this are unavailable.  - No sign of volume overload. He seems to be tolerating Toprol XL 12.5 bid which can be continued.  - Because of mild NAGA, we held off on ace-inhibitor, especially in the setting of borderline BP. Ideally, we would start him on lisinopril 2.5 mg po daily, though his creatinine will have to be at baseline.  - Resume Xarelto when able  - Watch creatinine and electrolytes. Keep K>4, Mg>2  - Tolerated toe amputation without cardiac complications.  - Will follow with you.

## 2019-07-27 NOTE — PROGRESS NOTE ADULT - ASSESSMENT
59M s/p Right foot partial hallux amp closed (DOS 7/26)   -pt seen and evaluated   -surgical site healing well, viable, no signs of infection   -intra operatively: low concern for residual infection, moderate concern for viability   -pt to stay in hospital for round of chemo   -podiatry discharge recs submitted, weight bear as tolerated in surgical shoe and keep dressing clean dry and intact until follow up   -discussed with attending

## 2019-07-28 LAB
ALBUMIN SERPL ELPH-MCNC: 3.6 G/DL — SIGNIFICANT CHANGE UP (ref 3.3–5)
ALP SERPL-CCNC: 61 U/L — SIGNIFICANT CHANGE UP (ref 40–120)
ALT FLD-CCNC: 17 U/L — SIGNIFICANT CHANGE UP (ref 10–45)
ANION GAP SERPL CALC-SCNC: 12 MMOL/L — SIGNIFICANT CHANGE UP (ref 5–17)
AST SERPL-CCNC: 23 U/L — SIGNIFICANT CHANGE UP (ref 10–40)
BASOPHILS # BLD AUTO: 0 K/UL — SIGNIFICANT CHANGE UP (ref 0–0.2)
BASOPHILS NFR BLD AUTO: 0 % — SIGNIFICANT CHANGE UP (ref 0–2)
BILIRUB SERPL-MCNC: 0.1 MG/DL — LOW (ref 0.2–1.2)
BUN SERPL-MCNC: 20 MG/DL — SIGNIFICANT CHANGE UP (ref 7–23)
CALCIUM SERPL-MCNC: 8.8 MG/DL — SIGNIFICANT CHANGE UP (ref 8.4–10.5)
CHLORIDE SERPL-SCNC: 101 MMOL/L — SIGNIFICANT CHANGE UP (ref 96–108)
CO2 SERPL-SCNC: 24 MMOL/L — SIGNIFICANT CHANGE UP (ref 22–31)
CREAT SERPL-MCNC: 1.19 MG/DL — SIGNIFICANT CHANGE UP (ref 0.5–1.3)
EOSINOPHIL # BLD AUTO: 0.1 K/UL — SIGNIFICANT CHANGE UP (ref 0–0.5)
EOSINOPHIL NFR BLD AUTO: 1 % — SIGNIFICANT CHANGE UP (ref 0–6)
GLUCOSE SERPL-MCNC: 103 MG/DL — HIGH (ref 70–99)
HCT VFR BLD CALC: 24.1 % — LOW (ref 39–50)
HGB BLD-MCNC: 8.1 G/DL — LOW (ref 13–17)
LYMPHOCYTES # BLD AUTO: 2.1 K/UL — SIGNIFICANT CHANGE UP (ref 1–3.3)
LYMPHOCYTES # BLD AUTO: 26.1 % — SIGNIFICANT CHANGE UP (ref 13–44)
MAGNESIUM SERPL-MCNC: 1.9 MG/DL — SIGNIFICANT CHANGE UP (ref 1.6–2.6)
MCHC RBC-ENTMCNC: 33.1 PG — SIGNIFICANT CHANGE UP (ref 27–34)
MCHC RBC-ENTMCNC: 33.5 GM/DL — SIGNIFICANT CHANGE UP (ref 32–36)
MCV RBC AUTO: 99 FL — SIGNIFICANT CHANGE UP (ref 80–100)
MONOCYTES # BLD AUTO: 0.7 K/UL — SIGNIFICANT CHANGE UP (ref 0–0.9)
MONOCYTES NFR BLD AUTO: 8.7 % — SIGNIFICANT CHANGE UP (ref 2–14)
NEUTROPHILS # BLD AUTO: 5.1 K/UL — SIGNIFICANT CHANGE UP (ref 1.8–7.4)
NEUTROPHILS NFR BLD AUTO: 64.2 % — SIGNIFICANT CHANGE UP (ref 43–77)
PHOSPHATE SERPL-MCNC: 4.2 MG/DL — SIGNIFICANT CHANGE UP (ref 2.5–4.5)
PLATELET # BLD AUTO: 80 K/UL — LOW (ref 150–400)
POTASSIUM SERPL-MCNC: 4.5 MMOL/L — SIGNIFICANT CHANGE UP (ref 3.5–5.3)
POTASSIUM SERPL-SCNC: 4.5 MMOL/L — SIGNIFICANT CHANGE UP (ref 3.5–5.3)
PROT SERPL-MCNC: 5.8 G/DL — LOW (ref 6–8.3)
RBC # BLD: 2.44 M/UL — LOW (ref 4.2–5.8)
RBC # FLD: 15.9 % — HIGH (ref 10.3–14.5)
SODIUM SERPL-SCNC: 137 MMOL/L — SIGNIFICANT CHANGE UP (ref 135–145)
WBC # BLD: 8 K/UL — SIGNIFICANT CHANGE UP (ref 3.8–10.5)
WBC # FLD AUTO: 8 K/UL — SIGNIFICANT CHANGE UP (ref 3.8–10.5)

## 2019-07-28 PROCEDURE — 99232 SBSQ HOSP IP/OBS MODERATE 35: CPT

## 2019-07-28 RX ORDER — SODIUM CHLORIDE 9 MG/ML
1000 INJECTION INTRAMUSCULAR; INTRAVENOUS; SUBCUTANEOUS
Refills: 0 | Status: DISCONTINUED | OUTPATIENT
Start: 2019-07-28 | End: 2019-08-03

## 2019-07-28 RX ADMIN — Medication 1 PATCH: at 19:45

## 2019-07-28 RX ADMIN — Medication 1 PATCH: at 07:00

## 2019-07-28 RX ADMIN — SODIUM CHLORIDE 20 MILLILITER(S): 9 INJECTION INTRAMUSCULAR; INTRAVENOUS; SUBCUTANEOUS at 18:42

## 2019-07-28 RX ADMIN — FINASTERIDE 5 MILLIGRAM(S): 5 TABLET, FILM COATED ORAL at 11:50

## 2019-07-28 RX ADMIN — Medication 100 MILLIGRAM(S): at 06:05

## 2019-07-28 RX ADMIN — Medication 12.5 MILLIGRAM(S): at 10:04

## 2019-07-28 RX ADMIN — Medication 100 MILLIGRAM(S): at 14:24

## 2019-07-28 RX ADMIN — OXYCODONE AND ACETAMINOPHEN 1 TABLET(S): 5; 325 TABLET ORAL at 17:15

## 2019-07-28 RX ADMIN — SENNA PLUS 2 TABLET(S): 8.6 TABLET ORAL at 06:04

## 2019-07-28 RX ADMIN — Medication 12.5 MILLIGRAM(S): at 22:00

## 2019-07-28 RX ADMIN — CEFTRIAXONE 100 MILLIGRAM(S): 500 INJECTION, POWDER, FOR SOLUTION INTRAMUSCULAR; INTRAVENOUS at 21:59

## 2019-07-28 RX ADMIN — SERTRALINE 50 MILLIGRAM(S): 25 TABLET, FILM COATED ORAL at 11:50

## 2019-07-28 RX ADMIN — Medication 1 PATCH: at 06:00

## 2019-07-28 RX ADMIN — Medication 1 PATCH: at 17:10

## 2019-07-28 RX ADMIN — Medication 1 PATCH: at 17:16

## 2019-07-28 RX ADMIN — SENNA PLUS 2 TABLET(S): 8.6 TABLET ORAL at 17:17

## 2019-07-28 RX ADMIN — VORICONAZOLE 200 MILLIGRAM(S): 10 INJECTION, POWDER, LYOPHILIZED, FOR SOLUTION INTRAVENOUS at 17:17

## 2019-07-28 RX ADMIN — CHLORHEXIDINE GLUCONATE 1 APPLICATION(S): 213 SOLUTION TOPICAL at 06:05

## 2019-07-28 RX ADMIN — SODIUM CHLORIDE 40 MILLILITER(S): 9 INJECTION INTRAMUSCULAR; INTRAVENOUS; SUBCUTANEOUS at 11:50

## 2019-07-28 RX ADMIN — Medication 100 MILLIGRAM(S): at 22:02

## 2019-07-28 RX ADMIN — RIVAROXABAN 20 MILLIGRAM(S): KIT at 17:17

## 2019-07-28 RX ADMIN — VORICONAZOLE 200 MILLIGRAM(S): 10 INJECTION, POWDER, LYOPHILIZED, FOR SOLUTION INTRAVENOUS at 06:05

## 2019-07-28 RX ADMIN — OXYCODONE AND ACETAMINOPHEN 1 TABLET(S): 5; 325 TABLET ORAL at 04:20

## 2019-07-28 RX ADMIN — OXYCODONE AND ACETAMINOPHEN 1 TABLET(S): 5; 325 TABLET ORAL at 16:38

## 2019-07-28 NOTE — PROGRESS NOTE ADULT - PROBLEM SELECTOR PLAN 2
7/12 MUGA with EF 28%  Continue Toprol XL 12.5 mg PO q 12   Keep K>4 and Mg>2.  Start Xarelto 20mg today as per Podiatry recom.  Follows with Cardiology outpatient. 7/12 MUGA with EF 28%  Continue Toprol XL 12.5 mg PO q 12   Keep K>4 and Mg>2.  Started Xarelto 20mg on 7/27 as per Podiatry recom.  Follows with Cardiology outpatient.

## 2019-07-28 NOTE — PROGRESS NOTE ADULT - SUBJECTIVE AND OBJECTIVE BOX
Diagnosis:    Protocol/Chemo Regimen:    Day:     Pt endorsed:    Review of Systems:     Pain scale:     Diet:     Allergies    No Known Allergies    Intolerances        ANTIMICROBIALS  cefTRIAXone   IVPB 2000 milliGRAM(s) IV Intermittent every 24 hours  voriconazole 200 milliGRAM(s) Oral every 12 hours      HEME/ONC MEDICATIONS  rivaroxaban 20 milliGRAM(s) Oral with dinner      STANDING MEDICATIONS  chlorhexidine 2% Cloths 1 Application(s) Topical <User Schedule>  docusate sodium 100 milliGRAM(s) Oral three times a day  finasteride 5 milliGRAM(s) Oral daily  metoprolol succinate ER 12.5 milliGRAM(s) Oral two times a day  nicotine -  14 mG/24Hr(s) Patch 1 patch Transdermal daily  senna 2 Tablet(s) Oral two times a day  sertraline 50 milliGRAM(s) Oral daily  sodium chloride 0.9%. 1000 milliLiter(s) IV Continuous <Continuous>      PRN MEDICATIONS  acetaminophen   Tablet .. 650 milliGRAM(s) Oral every 6 hours PRN  oxyCODONE    5 mG/acetaminophen 325 mG 1 Tablet(s) Oral every 4 hours PRN  polyethylene glycol 3350 17 Gram(s) Oral two times a day PRN        Vital Signs Last 24 Hrs  T(C): 35.7 (28 Jul 2019 05:00), Max: 36.9 (27 Jul 2019 17:00)  T(F): 96.3 (28 Jul 2019 05:00), Max: 98.4 (27 Jul 2019 17:00)  HR: 76 (28 Jul 2019 05:00) (70 - 80)  BP: 103/63 (28 Jul 2019 05:00) (103/63 - 115/72)  BP(mean): --  RR: 18 (28 Jul 2019 05:00) (18 - 18)  SpO2: 93% (28 Jul 2019 05:00) (93% - 96%)    PHYSICAL EXAM  General: NAD  HEENT: PERRLA, EOMOI, clear oropharynx, anicteric sclera, pink conjunctiva  Neck: supple  CV: (+) S1/S2 RRR  Lungs: clear to auscultation, no wheezes or rales  Abdomen: soft, non-tender, non-distended (+) BS  Ext: no clubbing, cyanosis or edema  Skin: no rashes and no petechiae  Neuro: alert and oriented X 3, no focal deficits  Central Line:     RECENT CULTURES:  07-26 @ 23:06  .Tissue CLEAR MARGIN BONE, RIGHT  --  --  --    No growth  --        LABS:                        8.7    9.9   )-----------( 106      ( 27 Jul 2019 07:00 )             25.9         Mean Cell Volume : 99.2 fl  Mean Cell Hemoglobin : 33.5 pg  Mean Cell Hemoglobin Concentration : 33.8 gm/dL  Auto Neutrophil # : 7.5 K/uL  Auto Lymphocyte # : 1.9 K/uL  Auto Monocyte # : 0.4 K/uL  Auto Eosinophil # : 0.0 K/uL  Auto Basophil # : 0.0 K/uL  Auto Neutrophil % : 75.9 %  Auto Lymphocyte % : 19.3 %  Auto Monocyte % : 4.2 %  Auto Eosinophil % : 0.5 %  Auto Basophil % : 0.1 %      07-27    137  |  98  |  16  ----------------------------<  118<H>  5.0   |  21<L>  |  1.22    Ca    9.1      27 Jul 2019 06:58  Phos  4.0     07-27  Mg     2.0     07-27    TPro  6.0  /  Alb  3.9  /  TBili  0.2  /  DBili  x   /  AST  18  /  ALT  13  /  AlkPhos  70  07-27          PT/INR - ( 26 Jul 2019 10:03 )   PT: 11.7 sec;   INR: 1.03 ratio         PTT - ( 26 Jul 2019 10:03 )  PTT:26.3 sec        RADIOLOGY & ADDITIONAL STUDIES: Diagnosis: AML/ right toe amputation    Protocol/Chemo Regimen: Cycle # 1 HIDAC (will start on Monday).    Pt endorsed: Feeling well this am.    Review of Systems: Denies any chest pain, palpitation, SOB, abdominal pain, nausea, vomiting or diarrhea.    Pain scale: Denies    Diet: Regular    Allergies: No Known Allergies    ANTIMICROBIALS  cefTRIAXone   IVPB 2000 milliGRAM(s) IV Intermittent every 24 hours  voriconazole 200 milliGRAM(s) Oral every 12 hours    HEME/ONC MEDICATIONS  rivaroxaban 20 milliGRAM(s) Oral with dinner    STANDING MEDICATIONS  chlorhexidine 2% Cloths 1 Application(s) Topical <User Schedule>  docusate sodium 100 milliGRAM(s) Oral three times a day  finasteride 5 milliGRAM(s) Oral daily  metoprolol succinate ER 12.5 milliGRAM(s) Oral two times a day  nicotine -  14 mG/24Hr(s) Patch 1 patch Transdermal daily  senna 2 Tablet(s) Oral two times a day  sertraline 50 milliGRAM(s) Oral daily  sodium chloride 0.9%. 1000 milliLiter(s) IV Continuous <Continuous>    PRN MEDICATIONS  acetaminophen   Tablet .. 650 milliGRAM(s) Oral every 6 hours PRN  oxyCODONE    5 mG/acetaminophen 325 mG 1 Tablet(s) Oral every 4 hours PRN  polyethylene glycol 3350 17 Gram(s) Oral two times a day PRN    Vital Signs Last 24 Hrs  T(C): 35.7 (28 Jul 2019 05:00), Max: 36.9 (27 Jul 2019 17:00)  T(F): 96.3 (28 Jul 2019 05:00), Max: 98.4 (27 Jul 2019 17:00)  HR: 76 (28 Jul 2019 05:00) (70 - 80)  BP: 103/63 (28 Jul 2019 05:00) (103/63 - 115/72)  BP(mean): --  RR: 18 (28 Jul 2019 05:00) (18 - 18)  SpO2: 93% (28 Jul 2019 05:00) (93% - 96%)    PHYSICAL EXAM  General: NAD  HEENT: PERRLA, EOMOI, clear oropharynx, anicteric sclera, pink conjunctiva  Neck: supple  CV: (+) S1/S2 RRR  Lungs: clear to auscultation, no wheezes or rales  Abdomen: soft, non-tender, non-distended (+) BS  Ext: s/p right toe amputation, dressing +, left leg no edema noted.  Skin: no rashes and no petechiae  Neuro: alert and oriented X 3, no focal deficits  Central Line: Right arm D/L PICC line, CDI.    RECENT CULTURES:  07-26 @ 23:06  .Tissue CLEAR MARGIN BONE, RIGHT  No growth    LABS:                                    8.1    8.0   )-----------( 80       ( 28 Jul 2019 06:44 )             24.1         Mean Cell Volume : 99.0 fl  Mean Cell Hemoglobin : 33.1 pg  Mean Cell Hemoglobin Concentration : 33.5 gm/dL  Auto Neutrophil # : 5.1 K/uL  Auto Lymphocyte # : 2.1 K/uL  Auto Monocyte # : 0.7 K/uL  Auto Eosinophil # : 0.1 K/uL  Auto Basophil # : 0.0 K/uL  Auto Neutrophil % : 64.2 %  Auto Lymphocyte % : 26.1 %  Auto Monocyte % : 8.7 %  Auto Eosinophil % : 1.0 %  Auto Basophil % : 0.0 %      07-28    137  |  101  |  20  ----------------------------<  103<H>  4.5   |  24  |  1.19    Ca    8.8      28 Jul 2019 06:44  Phos  4.2     07-28  Mg     1.9     07-28    TPro  5.8<L>  /  Alb  3.6  /  TBili  0.1<L>  /  DBili  x   /  AST  23  /  ALT  17  /  AlkPhos  61  07-28  Mg 1.9  Phos 4.2      RADIOLOGY & ADDITIONAL STUDIES:   Xray Foot AP + Lateral + Oblique, Right (07.26.19 @ 15:01) >  Patient is status post amputation of the first toe to the level of the   mid shaft of the first proximal phalanx. Surgical margins are sharp.   There is surrounding soft tissue edema. The joint spaces are preserved.   There is atherosclerotic disease. Diagnosis: AML/ right toe amputation    Protocol/Chemo Regimen: S/p right toe amputation, Cycle # 1 HIDAC (will start on Monday).    Pt endorsed: Feeling well this am.    Review of Systems: Denies any chest pain, palpitation, SOB, abdominal pain, nausea, vomiting or diarrhea.    Pain scale: Denies    Diet: Regular    Allergies: No Known Allergies    ANTIMICROBIALS  cefTRIAXone   IVPB 2000 milliGRAM(s) IV Intermittent every 24 hours  voriconazole 200 milliGRAM(s) Oral every 12 hours    HEME/ONC MEDICATIONS  rivaroxaban 20 milliGRAM(s) Oral with dinner    STANDING MEDICATIONS  chlorhexidine 2% Cloths 1 Application(s) Topical <User Schedule>  docusate sodium 100 milliGRAM(s) Oral three times a day  finasteride 5 milliGRAM(s) Oral daily  metoprolol succinate ER 12.5 milliGRAM(s) Oral two times a day  nicotine -  14 mG/24Hr(s) Patch 1 patch Transdermal daily  senna 2 Tablet(s) Oral two times a day  sertraline 50 milliGRAM(s) Oral daily  sodium chloride 0.9%. 1000 milliLiter(s) IV Continuous <Continuous>    PRN MEDICATIONS  acetaminophen   Tablet .. 650 milliGRAM(s) Oral every 6 hours PRN  oxyCODONE    5 mG/acetaminophen 325 mG 1 Tablet(s) Oral every 4 hours PRN  polyethylene glycol 3350 17 Gram(s) Oral two times a day PRN    Vital Signs Last 24 Hrs  T(C): 35.7 (28 Jul 2019 05:00), Max: 36.9 (27 Jul 2019 17:00)  T(F): 96.3 (28 Jul 2019 05:00), Max: 98.4 (27 Jul 2019 17:00)  HR: 76 (28 Jul 2019 05:00) (70 - 80)  BP: 103/63 (28 Jul 2019 05:00) (103/63 - 115/72)  BP(mean): --  RR: 18 (28 Jul 2019 05:00) (18 - 18)  SpO2: 93% (28 Jul 2019 05:00) (93% - 96%)    PHYSICAL EXAM  General: NAD  HEENT: PERRLA, EOMOI, clear oropharynx, anicteric sclera, pink conjunctiva  Neck: supple  CV: (+) S1/S2 RRR  Lungs: clear to auscultation, no wheezes or rales  Abdomen: soft, non-tender, non-distended (+) BS  Ext: s/p right toe amputation, dressing +, left leg no edema noted.  Skin: no rashes and no petechiae  Neuro: alert and oriented X 3, no focal deficits  Central Line: Right arm D/L PICC line, CDI.    RECENT CULTURES:  07-26 @ 23:06  .Tissue CLEAR MARGIN BONE, RIGHT  No growth    LABS:                                    8.1    8.0   )-----------( 80       ( 28 Jul 2019 06:44 )             24.1         Mean Cell Volume : 99.0 fl  Mean Cell Hemoglobin : 33.1 pg  Mean Cell Hemoglobin Concentration : 33.5 gm/dL  Auto Neutrophil # : 5.1 K/uL  Auto Lymphocyte # : 2.1 K/uL  Auto Monocyte # : 0.7 K/uL  Auto Eosinophil # : 0.1 K/uL  Auto Basophil # : 0.0 K/uL  Auto Neutrophil % : 64.2 %  Auto Lymphocyte % : 26.1 %  Auto Monocyte % : 8.7 %  Auto Eosinophil % : 1.0 %  Auto Basophil % : 0.0 %      07-28    137  |  101  |  20  ----------------------------<  103<H>  4.5   |  24  |  1.19    Ca    8.8      28 Jul 2019 06:44  Phos  4.2     07-28  Mg     1.9     07-28    TPro  5.8<L>  /  Alb  3.6  /  TBili  0.1<L>  /  DBili  x   /  AST  23  /  ALT  17  /  AlkPhos  61  07-28  Mg 1.9  Phos 4.2      RADIOLOGY & ADDITIONAL STUDIES:   Xray Foot AP + Lateral + Oblique, Right (07.26.19 @ 15:01) >  Patient is status post amputation of the first toe to the level of the   mid shaft of the first proximal phalanx. Surgical margins are sharp.   There is surrounding soft tissue edema. The joint spaces are preserved.   There is atherosclerotic disease.

## 2019-07-28 NOTE — PHYSICAL THERAPY INITIAL EVALUATION ADULT - PERTINENT HX OF CURRENT PROBLEM, REHAB EVAL
This is a 58yo M with PMHx of HFrEF (recent EF 28%), COLLEEN, DVT/PE, HTN, Factor V Leiden and AML diagnosed in 4/2019 s/p induction chemotherapy now in morphologic remission admitted for amputation of right toe due to osteomyelitis followed by consolidation chemotherapy with cycle 1 HiDAc. XR R foot, s/p R great toe amp,

## 2019-07-28 NOTE — PROGRESS NOTE ADULT - PROBLEM SELECTOR PLAN 3
With previous DVT and PE   Xarelto was on hold for toe amputation, restarted today.  Hold when PLT <50K. With previous DVT and PE   Xarelto was on hold for toe amputation, restarted on 7/27.  Hold when PLT <50K.

## 2019-07-28 NOTE — PHYSICAL THERAPY INITIAL EVALUATION ADULT - ADDITIONAL COMMENTS
as per pt and GF at b/s, pt resides in a walk up condo with GF, PTA, pt amb (I), unlimited distance until April / 2019 and get dx of ALL, pt has been using RW for amb since June d/t R toe gangrene. (I) with ADls.

## 2019-07-28 NOTE — PROGRESS NOTE ADULT - SUBJECTIVE AND OBJECTIVE BOX
[facility] - Ede Zamudio, Riki, Edgar, Gem, Tabitha Garcia  Office Number:  [phone]    Patient resting comfortably in bed in NAD.  Laying flat with no respiratory distress.  No overnight events    F/U for:  Cardiomyopathy    Telemetry:  OFF    MEDICATIONS  (STANDING):  cefTRIAXone   IVPB 2000 milliGRAM(s) IV Intermittent every 24 hours  chlorhexidine 2% Cloths 1 Application(s) Topical <User Schedule>  docusate sodium 100 milliGRAM(s) Oral three times a day  finasteride 5 milliGRAM(s) Oral daily  metoprolol succinate ER 12.5 milliGRAM(s) Oral two times a day  nicotine -  14 mG/24Hr(s) Patch 1 patch Transdermal daily  rivaroxaban 20 milliGRAM(s) Oral with dinner  senna 2 Tablet(s) Oral two times a day  sertraline 50 milliGRAM(s) Oral daily  sodium chloride 0.9%. 1000 milliLiter(s) (40 mL/Hr) IV Continuous <Continuous>  voriconazole 200 milliGRAM(s) Oral every 12 hours    MEDICATIONS  (PRN):  acetaminophen   Tablet .. 650 milliGRAM(s) Oral every 6 hours PRN Mild Pain (1 - 3)  oxyCODONE    5 mG/acetaminophen 325 mG 1 Tablet(s) Oral every 4 hours PRN Moderate Pain (4 - 6)  polyethylene glycol 3350 17 Gram(s) Oral two times a day PRN Constipation      Allergies    No Known Allergies    Intolerances        Vital Signs Last 24 Hrs  T(C): 35.7 (28 Jul 2019 05:00), Max: 36.9 (27 Jul 2019 17:00)  T(F): 96.3 (28 Jul 2019 05:00), Max: 98.4 (27 Jul 2019 17:00)  HR: 76 (28 Jul 2019 05:00) (70 - 80)  BP: 103/63 (28 Jul 2019 05:00) (103/63 - 115/72)  BP(mean): --  RR: 18 (28 Jul 2019 05:00) (18 - 18)  SpO2: 93% (28 Jul 2019 05:00) (93% - 96%)    I&O's Summary    26 Jul 2019 07:01  -  27 Jul 2019 07:00  --------------------------------------------------------  IN: 800 mL / OUT: 2200 mL / NET: -1400 mL    27 Jul 2019 07:01  -  28 Jul 2019 06:44  --------------------------------------------------------  IN: 1214 mL / OUT: 1800 mL / NET: -586 mL        ON EXAM:    Constitutional: NAD, alert and oriented x 3  Lungs:  Non-labored, breath sounds are clear bilaterally, No wheezing, rales or rhonchi  Cardiovascular: RRR.  S1 and S2 positive.  No murmurs, rubs, gallops or clicks  Gastrointestinal: Bowel Sounds present, soft, nontender.   Lymph: No peripheral edema; right foot wrapped. No cervical lymphadenopathy.  Neurological: Alert, no focal deficits  Skin: No rashes or ulcers   Psych:  Mood & affect appropriate.    LABS: All Labs Reviewed:                        8.7    9.9   )-----------( 106      ( 27 Jul 2019 07:00 )             25.9                         9.1    6.9   )-----------( 110      ( 26 Jul 2019 09:36 )             27.1                         9.0    6.3   )-----------( 73       ( 26 Jul 2019 05:51 )             25.2     27 Jul 2019 06:58    137    |  98     |  16     ----------------------------<  118    5.0     |  21     |  1.22   26 Jul 2019 05:05    139    |  102    |  14     ----------------------------<  100    4.6     |  24     |  1.46   25 Jul 2019 20:28    139    |  101    |  12     ----------------------------<  124    4.4     |  24     |  1.31     Ca    9.1        27 Jul 2019 06:58  Ca    9.2        26 Jul 2019 05:05  Ca    9.3        25 Jul 2019 20:28  Phos  4.0       27 Jul 2019 06:58  Phos  4.4       26 Jul 2019 05:05  Phos  3.7       25 Jul 2019 20:28  Mg     2.0       27 Jul 2019 06:58  Mg     1.9       26 Jul 2019 05:05  Mg     1.8       25 Jul 2019 20:28    TPro  6.0    /  Alb  3.9    /  TBili  0.2    /  DBili  x      /  AST  18     /  ALT  13     /  AlkPhos  70     27 Jul 2019 06:58  TPro  5.5    /  Alb  3.5    /  TBili  0.2    /  DBili  x      /  AST  17     /  ALT  11     /  AlkPhos  67     26 Jul 2019 05:05  TPro  5.6    /  Alb  3.6    /  TBili  0.2    /  DBili  x      /  AST  19     /  ALT  12     /  AlkPhos  67     25 Jul 2019 20:28    PT/INR - ( 26 Jul 2019 10:03 )   PT: 11.7 sec;   INR: 1.03 ratio         PTT - ( 26 Jul 2019 10:03 )  PTT:26.3 sec      Blood Culture: Organism --  Gram Stain Blood -- Gram Stain   Rare polymorphonuclear leukocytes seen per low power field  No organisms seen per oil power field  Specimen Source .Tissue CLEAR MARGIN BONE, RIGHT  Culture-Blood --

## 2019-07-28 NOTE — PROGRESS NOTE ADULT - ASSESSMENT
Mr. Delong is a 59 year old male with AML previously on anthracycline based chemotherapy with a reported EF in the 20's at this time, who was recently admitted for chemotherapy and a toe infection, now returns for amputation of right toe due to osteomyelitis followed by consolidation chemotherapy with cycle 1 HiDAc.    Recently, he had an echocardiogram in our office 7/2019 with mild MR, eccentric LVH with moderate LV dysfunction (EF 35-40%), and mild aortic root dilatation. MUGA with Dilated left ventricle with generalized hypokinesia and EF 28 %, slightly lower than the echocardiogram.  He also has a history of Factor V Leiden and VTE, and has been on Xarelto.    - No sign of acute ischemia, nor does he have any chest pain or difficulty breathing. His last EKG demonstrates a RBBB, LAD, RVH and non-specific st abn.  He reports a cath about 5 years ago at [facility] that was unremarkable and a stress test more recently, though results of this are unavailable.  - No sign of volume overload. He seems to be tolerating Toprol XL 12.5 bid which can be continued.  - Because of mild NAGA, we held off on ace-inhibitor, especially in the setting of borderline BP. Ideally, we would start him on lisinopril 2.5 mg po daily.  This does not need to be started now  - Xarelto 20 resumed  - Watch creatinine and electrolytes. Keep K>4, Mg>2  - Tolerated toe amputation without cardiac complications.  - Will follow with you.

## 2019-07-28 NOTE — PROGRESS NOTE ADULT - ATTENDING COMMENTS
58yo M with AML in CR admitted for right big toe amputation due to osteomyelitis.   Podiatry following -weight bear as tolerated in surgical shoe and keep dressing clean dry and intact   Resume William  Plan to start HIDAC on 7/29 58yo M with AML in CR admitted for right big toe amputation due to osteomyelitis, done 7/26.   Podiatry following -weight bear as tolerated in surgical shoe and keep dressing clean dry and intact   Resumed Xarelto  Plan to start HIDAC on 7/29

## 2019-07-29 PROBLEM — I82.409 ACUTE EMBOLISM AND THROMBOSIS OF UNSPECIFIED DEEP VEINS OF UNSPECIFIED LOWER EXTREMITY: Chronic | Status: ACTIVE | Noted: 2019-07-25

## 2019-07-29 PROBLEM — I26.99 OTHER PULMONARY EMBOLISM WITHOUT ACUTE COR PULMONALE: Chronic | Status: ACTIVE | Noted: 2019-07-25

## 2019-07-29 LAB
ALBUMIN SERPL ELPH-MCNC: 3.5 G/DL — SIGNIFICANT CHANGE UP (ref 3.3–5)
ALP SERPL-CCNC: 64 U/L — SIGNIFICANT CHANGE UP (ref 40–120)
ALT FLD-CCNC: 18 U/L — SIGNIFICANT CHANGE UP (ref 10–45)
ANION GAP SERPL CALC-SCNC: 12 MMOL/L — SIGNIFICANT CHANGE UP (ref 5–17)
AST SERPL-CCNC: 19 U/L — SIGNIFICANT CHANGE UP (ref 10–40)
BASOPHILS # BLD AUTO: 0 K/UL — SIGNIFICANT CHANGE UP (ref 0–0.2)
BASOPHILS NFR BLD AUTO: 0.2 % — SIGNIFICANT CHANGE UP (ref 0–2)
BILIRUB SERPL-MCNC: <0.1 MG/DL — LOW (ref 0.2–1.2)
BLD GP AB SCN SERPL QL: NEGATIVE — SIGNIFICANT CHANGE UP
BUN SERPL-MCNC: 19 MG/DL — SIGNIFICANT CHANGE UP (ref 7–23)
CALCIUM SERPL-MCNC: 8.9 MG/DL — SIGNIFICANT CHANGE UP (ref 8.4–10.5)
CHLORIDE SERPL-SCNC: 104 MMOL/L — SIGNIFICANT CHANGE UP (ref 96–108)
CO2 SERPL-SCNC: 23 MMOL/L — SIGNIFICANT CHANGE UP (ref 22–31)
CREAT SERPL-MCNC: 1.19 MG/DL — SIGNIFICANT CHANGE UP (ref 0.5–1.3)
EOSINOPHIL # BLD AUTO: 0.1 K/UL — SIGNIFICANT CHANGE UP (ref 0–0.5)
EOSINOPHIL NFR BLD AUTO: 1.8 % — SIGNIFICANT CHANGE UP (ref 0–6)
GLUCOSE SERPL-MCNC: 106 MG/DL — HIGH (ref 70–99)
HCT VFR BLD CALC: 25.6 % — LOW (ref 39–50)
HGB BLD-MCNC: 8.4 G/DL — LOW (ref 13–17)
LYMPHOCYTES # BLD AUTO: 2.4 K/UL — SIGNIFICANT CHANGE UP (ref 1–3.3)
LYMPHOCYTES # BLD AUTO: 34.6 % — SIGNIFICANT CHANGE UP (ref 13–44)
MAGNESIUM SERPL-MCNC: 1.8 MG/DL — SIGNIFICANT CHANGE UP (ref 1.6–2.6)
MCHC RBC-ENTMCNC: 32.8 PG — SIGNIFICANT CHANGE UP (ref 27–34)
MCHC RBC-ENTMCNC: 33.1 GM/DL — SIGNIFICANT CHANGE UP (ref 32–36)
MCV RBC AUTO: 99.2 FL — SIGNIFICANT CHANGE UP (ref 80–100)
MONOCYTES # BLD AUTO: 0.8 K/UL — SIGNIFICANT CHANGE UP (ref 0–0.9)
MONOCYTES NFR BLD AUTO: 11.4 % — SIGNIFICANT CHANGE UP (ref 2–14)
NEUTROPHILS # BLD AUTO: 3.6 K/UL — SIGNIFICANT CHANGE UP (ref 1.8–7.4)
NEUTROPHILS NFR BLD AUTO: 52 % — SIGNIFICANT CHANGE UP (ref 43–77)
PHOSPHATE SERPL-MCNC: 3.9 MG/DL — SIGNIFICANT CHANGE UP (ref 2.5–4.5)
PLATELET # BLD AUTO: 87 K/UL — LOW (ref 150–400)
POTASSIUM SERPL-MCNC: 4.1 MMOL/L — SIGNIFICANT CHANGE UP (ref 3.5–5.3)
POTASSIUM SERPL-SCNC: 4.1 MMOL/L — SIGNIFICANT CHANGE UP (ref 3.5–5.3)
PROT SERPL-MCNC: 5.8 G/DL — LOW (ref 6–8.3)
RBC # BLD: 2.57 M/UL — LOW (ref 4.2–5.8)
RBC # FLD: 16.3 % — HIGH (ref 10.3–14.5)
RH IG SCN BLD-IMP: POSITIVE — SIGNIFICANT CHANGE UP
SODIUM SERPL-SCNC: 139 MMOL/L — SIGNIFICANT CHANGE UP (ref 135–145)
WBC # BLD: 7 K/UL — SIGNIFICANT CHANGE UP (ref 3.8–10.5)
WBC # FLD AUTO: 7 K/UL — SIGNIFICANT CHANGE UP (ref 3.8–10.5)

## 2019-07-29 PROCEDURE — 99233 SBSQ HOSP IP/OBS HIGH 50: CPT | Mod: GC

## 2019-07-29 PROCEDURE — 99232 SBSQ HOSP IP/OBS MODERATE 35: CPT

## 2019-07-29 RX ORDER — MAGNESIUM SULFATE 500 MG/ML
2 VIAL (ML) INJECTION ONCE
Refills: 0 | Status: COMPLETED | OUTPATIENT
Start: 2019-07-29 | End: 2019-07-29

## 2019-07-29 RX ORDER — METOCLOPRAMIDE HCL 10 MG
1 TABLET ORAL
Qty: 40 | Refills: 0
Start: 2019-07-29 | End: 2019-08-07

## 2019-07-29 RX ORDER — ONDANSETRON 8 MG/1
1 TABLET, FILM COATED ORAL
Qty: 30 | Refills: 0
Start: 2019-07-29 | End: 2019-08-07

## 2019-07-29 RX ORDER — FOSAPREPITANT DIMEGLUMINE 150 MG/5ML
150 INJECTION, POWDER, LYOPHILIZED, FOR SOLUTION INTRAVENOUS ONCE
Refills: 0 | Status: COMPLETED | OUTPATIENT
Start: 2019-07-29 | End: 2019-07-29

## 2019-07-29 RX ORDER — ONDANSETRON 8 MG/1
8 TABLET, FILM COATED ORAL EVERY 8 HOURS
Refills: 0 | Status: DISCONTINUED | OUTPATIENT
Start: 2019-07-29 | End: 2019-08-03

## 2019-07-29 RX ORDER — PREDNISOLONE SODIUM PHOSPHATE 1 %
2 DROPS OPHTHALMIC (EYE) EVERY 6 HOURS
Refills: 0 | Status: DISCONTINUED | OUTPATIENT
Start: 2019-07-29 | End: 2019-08-03

## 2019-07-29 RX ORDER — METOCLOPRAMIDE HCL 10 MG
10 TABLET ORAL EVERY 6 HOURS
Refills: 0 | Status: DISCONTINUED | OUTPATIENT
Start: 2019-07-29 | End: 2019-08-03

## 2019-07-29 RX ORDER — CYTARABINE 100 MG
6150 VIAL (EA) INJECTION EVERY 12 HOURS
Refills: 0 | Status: COMPLETED | OUTPATIENT
Start: 2019-07-29 | End: 2019-07-30

## 2019-07-29 RX ADMIN — CHLORHEXIDINE GLUCONATE 1 APPLICATION(S): 213 SOLUTION TOPICAL at 10:07

## 2019-07-29 RX ADMIN — Medication 1 PATCH: at 21:00

## 2019-07-29 RX ADMIN — Medication 100 MILLIGRAM(S): at 05:53

## 2019-07-29 RX ADMIN — FINASTERIDE 5 MILLIGRAM(S): 5 TABLET, FILM COATED ORAL at 12:02

## 2019-07-29 RX ADMIN — VORICONAZOLE 200 MILLIGRAM(S): 10 INJECTION, POWDER, LYOPHILIZED, FOR SOLUTION INTRAVENOUS at 17:28

## 2019-07-29 RX ADMIN — SERTRALINE 50 MILLIGRAM(S): 25 TABLET, FILM COATED ORAL at 12:02

## 2019-07-29 RX ADMIN — SENNA PLUS 2 TABLET(S): 8.6 TABLET ORAL at 17:28

## 2019-07-29 RX ADMIN — ONDANSETRON 8 MILLIGRAM(S): 8 TABLET, FILM COATED ORAL at 14:37

## 2019-07-29 RX ADMIN — ONDANSETRON 8 MILLIGRAM(S): 8 TABLET, FILM COATED ORAL at 23:05

## 2019-07-29 RX ADMIN — VORICONAZOLE 200 MILLIGRAM(S): 10 INJECTION, POWDER, LYOPHILIZED, FOR SOLUTION INTRAVENOUS at 05:53

## 2019-07-29 RX ADMIN — Medication 1 PATCH: at 08:32

## 2019-07-29 RX ADMIN — Medication 50 GRAM(S): at 10:07

## 2019-07-29 RX ADMIN — RIVAROXABAN 20 MILLIGRAM(S): KIT at 17:28

## 2019-07-29 RX ADMIN — Medication 1 PATCH: at 19:30

## 2019-07-29 RX ADMIN — Medication 100 MILLIGRAM(S): at 23:04

## 2019-07-29 RX ADMIN — OXYCODONE AND ACETAMINOPHEN 1 TABLET(S): 5; 325 TABLET ORAL at 12:24

## 2019-07-29 RX ADMIN — SENNA PLUS 2 TABLET(S): 8.6 TABLET ORAL at 05:53

## 2019-07-29 RX ADMIN — Medication 187.17 MILLIGRAM(S): at 14:38

## 2019-07-29 RX ADMIN — Medication 100 MILLIGRAM(S): at 15:12

## 2019-07-29 RX ADMIN — CEFTRIAXONE 100 MILLIGRAM(S): 500 INJECTION, POWDER, FOR SOLUTION INTRAMUSCULAR; INTRAVENOUS at 21:28

## 2019-07-29 RX ADMIN — Medication 1 PATCH: at 17:28

## 2019-07-29 RX ADMIN — Medication 12.5 MILLIGRAM(S): at 10:06

## 2019-07-29 RX ADMIN — FOSAPREPITANT DIMEGLUMINE 300 MILLIGRAM(S): 150 INJECTION, POWDER, LYOPHILIZED, FOR SOLUTION INTRAVENOUS at 13:57

## 2019-07-29 RX ADMIN — Medication 2 DROP(S): at 17:27

## 2019-07-29 NOTE — PROGRESS NOTE ADULT - PROBLEM SELECTOR PLAN 3
With previous DVT and PE   Xarelto was on hold for toe amputation, restarted on 7/27.  Hold when PLT <50K. Status post Right great toe amputation 7/26  Continue abx  Podiatry folowing

## 2019-07-29 NOTE — PROGRESS NOTE ADULT - PROBLEM SELECTOR PLAN 6
No pharmacologic ppx 2/2 thrombocytopenia and surgical procedure in am per Podiatry Continue Xarelto as above  D/C if PLT < 50K    Contact Information [phone]

## 2019-07-29 NOTE — PROGRESS NOTE ADULT - PROBLEM SELECTOR PLAN 4
Right toe amputation today.  Continue abx.  S/p right toe amputation, continue antibiotics. 7/12 MUGA with EF 28%  Continue Toprol XL 12.5 mg PO q 12   Keep K>4 and Mg>2  Continue Toprol XL 12.5mg PO BID  Cardiology following

## 2019-07-29 NOTE — PROGRESS NOTE ADULT - SUBJECTIVE AND OBJECTIVE BOX
Diagnosis: AML    Protocol/Chemo Regimen:  Cycle 1 of HiDAc    Day: 1     Pt endorsed: Feeling well this am    Review of Systems: Denies any chest pain, palpitation, SOB, abdominal pain, nausea, vomiting or diarrhea.    Pain scale: Denies    Diet: Regular    Allergies: No Known Allergies      ANTIMICROBIALS  cefTRIAXone   IVPB 2000 milliGRAM(s) IV Intermittent every 24 hours  voriconazole 200 milliGRAM(s) Oral every 12 hours      HEME/ONC MEDICATIONS  rivaroxaban 20 milliGRAM(s) Oral with dinner      STANDING MEDICATIONS  chlorhexidine 2% Cloths 1 Application(s) Topical <User Schedule>  docusate sodium 100 milliGRAM(s) Oral three times a day  finasteride 5 milliGRAM(s) Oral daily  metoprolol succinate ER 12.5 milliGRAM(s) Oral two times a day  nicotine -  14 mG/24Hr(s) Patch 1 patch Transdermal daily  senna 2 Tablet(s) Oral two times a day  sertraline 50 milliGRAM(s) Oral daily  sodium chloride 0.9%. 1000 milliLiter(s) IV Continuous <Continuous>      PRN MEDICATIONS  acetaminophen   Tablet .. 650 milliGRAM(s) Oral every 6 hours PRN  oxyCODONE    5 mG/acetaminophen 325 mG 1 Tablet(s) Oral every 4 hours PRN  polyethylene glycol 3350 17 Gram(s) Oral two times a day PRN      Vital Signs Last 24 Hrs  T(C): 35.7 (29 Jul 2019 05:53), Max: 36.7 (28 Jul 2019 21:00)  T(F): 96.3 (29 Jul 2019 05:53), Max: 98.1 (28 Jul 2019 21:00)  HR: 85 (29 Jul 2019 05:53) (69 - 99)  BP: 106/52 (29 Jul 2019 05:53) (104/67 - 116/75)  BP(mean): --  RR: 18 (29 Jul 2019 05:53) (18 - 18)  SpO2: 96% (29 Jul 2019 05:53) (93% - 96%)      PHYSICAL EXAM  General: NAD  HEENT: PERRLA, EOMI, clear oropharynx  Neck: supple  CV: (+) S1/S2 RRR  Lungs: clear to auscultation, no wheezes or rales  Abdomen: soft, non-tender, non-distended (+) BS  Ext: no edema. R great toe with DSD  Skin: no rashes   Neuro: alert and oriented X 3, no focal deficits  Central Line: C/D/I      LABS:                         RECENT CULTURES:    Culture - Tissue with Gram Stain (07.26.19 @ 23:06)    Gram Stain:   Rare polymorphonuclear leukocytes seen per low power field  No organisms seen per oil power field    Specimen Source: .Tissue CLEAR MARGIN BONE, RIGHT    Culture Results:   No growth      RADIOLOGY & ADDITIONAL STUDIES:    EXAM:  FOOT COMPLETE RIGHT (MIN 3 VIEW)                        PROCEDURE DATE:  07/26/2019    Findings: Patient is status post amputation of the first toe to the level of the   mid shaft of the first proximal phalanx. Surgical margins are sharp.   There is surrounding soft tissue edema. The joint spaces are preserved.   There is atherosclerotic disease. Diagnosis: AML    Protocol/Chemo Regimen:  Cycle 1 of HiDAc    Day: 1     Pt endorsed: Feeling well this am    Review of Systems: Denies any chest pain, palpitation, SOB, abdominal pain, nausea, vomiting or diarrhea.    Pain scale: Denies    Diet: Regular    Allergies: No Known Allergies      ANTIMICROBIALS  cefTRIAXone   IVPB 2000 milliGRAM(s) IV Intermittent every 24 hours  voriconazole 200 milliGRAM(s) Oral every 12 hours      HEME/ONC MEDICATIONS  rivaroxaban 20 milliGRAM(s) Oral with dinner      STANDING MEDICATIONS  chlorhexidine 2% Cloths 1 Application(s) Topical <User Schedule>  docusate sodium 100 milliGRAM(s) Oral three times a day  finasteride 5 milliGRAM(s) Oral daily  metoprolol succinate ER 12.5 milliGRAM(s) Oral two times a day  nicotine -  14 mG/24Hr(s) Patch 1 patch Transdermal daily  senna 2 Tablet(s) Oral two times a day  sertraline 50 milliGRAM(s) Oral daily  sodium chloride 0.9%. 1000 milliLiter(s) IV Continuous <Continuous>      PRN MEDICATIONS  acetaminophen   Tablet .. 650 milliGRAM(s) Oral every 6 hours PRN  oxyCODONE    5 mG/acetaminophen 325 mG 1 Tablet(s) Oral every 4 hours PRN  polyethylene glycol 3350 17 Gram(s) Oral two times a day PRN      Vital Signs Last 24 Hrs  T(C): 35.7 (29 Jul 2019 05:53), Max: 36.7 (28 Jul 2019 21:00)  T(F): 96.3 (29 Jul 2019 05:53), Max: 98.1 (28 Jul 2019 21:00)  HR: 85 (29 Jul 2019 05:53) (69 - 99)  BP: 106/52 (29 Jul 2019 05:53) (104/67 - 116/75)  BP(mean): --  RR: 18 (29 Jul 2019 05:53) (18 - 18)  SpO2: 96% (29 Jul 2019 05:53) (93% - 96%)      PHYSICAL EXAM  General: NAD  HEENT: PERRLA, EOMI, clear oropharynx  Neck: supple  CV: (+) S1/S2 RRR  Lungs: clear to auscultation, no wheezes or rales  Abdomen: soft, non-tender, non-distended (+) BS  Ext: no edema. R great toe with DSD  Skin: no rashes   Neuro: alert and oriented X 3, no focal deficits  Central Line: C/D/I      LABS:                                8.4    7.0   )-----------( 87       ( 29 Jul 2019 07:19 )             25.6         Mean Cell Volume : 99.2 fl  Mean Cell Hemoglobin : 32.8 pg  Mean Cell Hemoglobin Concentration : 33.1 gm/dL  Auto Neutrophil # : 3.6 K/uL  Auto Lymphocyte # : 2.4 K/uL  Auto Monocyte # : 0.8 K/uL  Auto Eosinophil # : 0.1 K/uL  Auto Basophil # : 0.0 K/uL  Auto Neutrophil % : 52.0 %  Auto Lymphocyte % : 34.6 %  Auto Monocyte % : 11.4 %  Auto Eosinophil % : 1.8 %  Auto Basophil % : 0.2 %      07-29    139  |  104  |  19  ----------------------------<  106<H>  4.1   |  23  |  1.19    Ca    8.9      29 Jul 2019 07:18  Phos  3.9     07-29  Mg     1.8     07-29    TPro  5.8<L>  /  Alb  3.5  /  TBili  <0.1<L>  /  DBili  x   /  AST  19  /  ALT  18  /  AlkPhos  64  07-29      Mg 1.8  Phos 3.9        RECENT CULTURES:    Culture - Tissue with Gram Stain (07.26.19 @ 23:06)    Gram Stain:   Rare polymorphonuclear leukocytes seen per low power field  No organisms seen per oil power field    Specimen Source: .Tissue CLEAR MARGIN BONE, RIGHT    Culture Results:   No growth      RADIOLOGY & ADDITIONAL STUDIES:    EXAM:  FOOT COMPLETE RIGHT (MIN 3 VIEW)                        PROCEDURE DATE:  07/26/2019    Findings: Patient is status post amputation of the first toe to the level of the   mid shaft of the first proximal phalanx. Surgical margins are sharp.   There is surrounding soft tissue edema. The joint spaces are preserved.   There is atherosclerotic disease.

## 2019-07-29 NOTE — PROGRESS NOTE ADULT - ASSESSMENT
Mr. Delong is a 59 year old male with AML previously on anthracycline based chemotherapy with a reported EF in the 20's at this time, who was recently admitted for chemotherapy and a toe infection, now returns for amputation of right toe due to osteomyelitis followed by consolidation chemotherapy with cycle 1 HiDAc.    Recently, he had an echocardiogram in our office 7/2019 with mild MR, eccentric LVH with moderate LV dysfunction (EF 35-40%), and mild aortic root dilatation. MUGA with Dilated left ventricle with generalized hypokinesia and EF 28 %, slightly lower than the echocardiogram.  He also has a history of Factor V Leiden and VTE, and has been on Xarelto.    - No sign of acute ischemia, nor does he have any chest pain or difficulty breathing. His last EKG demonstrates a RBBB, LAD, RVH and non-specific st abn.  He reports a cath about 5 years ago at [facility] that was unremarkable and a stress test more recently, though results of this are unavailable.  - No sign of volume overload. He seems to be tolerating Toprol XL 12.5 bid which can be continued.  - Because of mild NAGA, we held off on ace-inhibitor, especially in the setting of borderline BP. Ideally, we would start him on lisinopril 2.5 mg po daily.  This does not need to be started now  - Xarelto 20 resumed  - Watch creatinine and electrolytes. Keep K>4, Mg>2  - Tolerated toe amputation without cardiac complications.  - To start chemo today  - Will follow with you.

## 2019-07-29 NOTE — PROGRESS NOTE ADULT - PROBLEM SELECTOR PLAN 2
7/12 MUGA with EF 28%  Continue Toprol XL 12.5 mg PO q 12   Keep K>4 and Mg>2.  Started Xarelto 20mg on 7/27 as per Podiatry recom.  Follows with Cardiology outpatient. If febrile Pan Cx and CXR  Continue Ceftriaxone 2gm IV q day for 6 weeks (through 8/21 for osteomyelitis)  Continue Voriconazole for Aspergillus of face.

## 2019-07-29 NOTE — PROGRESS NOTE ADULT - ASSESSMENT
59M s/p Right foot partial hallux amp closed (DOS 7/26)   -pt seen and evaluated   -surgical site healing well, viable, no signs of infection, no signs of hematoma or active bleeding   -intra operatively: low concern for residual infection, moderate concern for viability   -pt to stay in hospital for round of chemo   -podiatry discharge recs submitted, weight bear as tolerated in surgical shoe and keep dressing clean dry and intact until follow up  - pt to be d/c'd w/ augmentin for a week   -Seen with attending

## 2019-07-29 NOTE — PROGRESS NOTE ADULT - SUBJECTIVE AND OBJECTIVE BOX
Podiatry pager #: [phone] ([place])/ [phone] ([facility])    Patient is a 59y old  Male who presents with a chief complaint of Amputation of right great toe and Cycle 1 of HiDAc (28 Jul 2019 07:14)       INTERVAL HPI/OVERNIGHT EVENTS:  Patient seen and evaluated at bedside.  Pt is resting comfortable in NAD. Denies N/V/F/C.     Allergies    No Known Allergies    Intolerances        Vital Signs Last 24 Hrs  T(C): 35.7 (29 Jul 2019 05:53), Max: 36.7 (28 Jul 2019 21:00)  T(F): 96.3 (29 Jul 2019 05:53), Max: 98.1 (28 Jul 2019 21:00)  HR: 85 (29 Jul 2019 05:53) (69 - 99)  BP: 106/52 (29 Jul 2019 05:53) (104/67 - 116/75)  BP(mean): --  RR: 18 (29 Jul 2019 05:53) (18 - 18)  SpO2: 96% (29 Jul 2019 05:53) (93% - 96%)    LABS:                        8.1    8.0   )-----------( 80       ( 28 Jul 2019 06:44 )             24.1     07-28    137  |  101  |  20  ----------------------------<  103<H>  4.5   |  24  |  1.19    Ca    8.8      28 Jul 2019 06:44  Phos  4.2     07-28  Mg     1.9     07-28    TPro  5.8<L>  /  Alb  3.6  /  TBili  0.1<L>  /  DBili  x   /  AST  23  /  ALT  17  /  AlkPhos  61  07-28        CAPILLARY BLOOD GLUCOSE          Lower Extremity Physical Exam:  s/p Right foot partial hallux amp closed, sutures intact, no signs of dehiscence or necrosis, no hematoma, flap warm to touch with good CFT   RADIOLOGY & ADDITIONAL TESTS:    < from: Xray Foot AP + Lateral + Oblique, Right (07.26.19 @ 15:01) >  EXAM:  FOOT COMPLETE RIGHT (MIN 3 VIEW)                            PROCEDURE DATE:  07/26/2019            INTERPRETATION:  History: Status post partial right hallux amputation.   History of osteomyelitis.    3 views of the right foot were performed.    Comparison is made to prior radiographs from July 12, 2019.    Findings:    Patient is status post amputation of the first toe to the level of the   mid shaft of the first proximal phalanx. Surgical margins are sharp.   There is surrounding soft tissue edema. The joint spaces are preserved.   There is atherosclerotic disease.                    DONAVON NORIEGA M.D., ATTENDING RADIOLOGIST  This document has been electronically signed. Jul 27 2019 10:19AM    < end of copied text >

## 2019-07-29 NOTE — ADVANCED PRACTICE NURSE CONSULT - ASSESSMENT
Pt. seen in bed a/ox4,denies any discomfort at t his time.Pt. verbalized understanding of chemotherapy infusion.Reading material given outlining indications and possible side effects of chemotherapy.Labs reviewed by Dr. Coelho upon signing chemotherapy orders.Drug verification done by 2 RN.'s.Pt. has right upper arm PICC .Site WNL,with positive blood return noted and flushing easily with 10 ml NS.Dsg dry and intact.Nystagmus check done with negative result.Pt. received zofran 8 mg IVP and emend 150 mg IV as premedication.At 1438 Day 1/5 Cytarabine 3gm/n3=2611 mg IV started and infusing well over 3 hours to PICC via alaris pump.Pt. tolerating well.Left pt. comfortable in bed.Primary RN aware of present treatment.

## 2019-07-29 NOTE — PROGRESS NOTE ADULT - PROBLEM SELECTOR PLAN 1
Monitor CBC with diff.  Transfuse PRN.  Monitor electrolytes.  Supplement as needed.  Strict I/O.  Daily weights.  Plan to start chemo on Monday (7/29), HIDAC. Start Cycle 1 consolidation HiDAc today  Monitor CBC/Lytes and transfuse replete PRN  Strict Is and Os/Daily weights/Mouth Care  Monitor for cerebellar toxicity  Pred forte eye drops  IVF  Antiemetics.

## 2019-07-29 NOTE — PROGRESS NOTE ADULT - ASSESSMENT
This is a 58yo M with PMHx of HFrEF (recent EF 28%), COLLEEN, DVT/PE, HTN, Factor V Leiden and AML diagnosed in 4/2019 s/p induction chemotherapy now in morphologic remission admitted for amputation of right great toe due to osteomyelitis to be followed by consolidation chemotherapy with cycle 1 HiDAc. This is a 58yo M with PMHx of HFrEF (recent EF 28%), COLLEEN, DVT/PE, HTN, Factor V Leiden and AML diagnosed in 4/2019 status post induction chemotherapy now in morphologic remission admitted for amputation of right great toe due to osteomyelitis to be followed by consolidation chemotherapy with cycle 1 HiDAc.

## 2019-07-29 NOTE — PROGRESS NOTE ADULT - SUBJECTIVE AND OBJECTIVE BOX
[facility] - Ede Zamudio, Riki, Edgar, Gem, Tabitha Garcia  Office Number:  [phone]    Patient resting comfortably in bed in NAD.  Laying flat with no respiratory distress.  No complaints of chest pain, dyspnea, palpitations, PND, or orthopnea.    F/U for:  Cardiomyopathy    Telemetry:  OFF    MEDICATIONS  (STANDING):  cefTRIAXone   IVPB 2000 milliGRAM(s) IV Intermittent every 24 hours  chlorhexidine 2% Cloths 1 Application(s) Topical <User Schedule>  docusate sodium 100 milliGRAM(s) Oral three times a day  finasteride 5 milliGRAM(s) Oral daily  metoprolol succinate ER 12.5 milliGRAM(s) Oral two times a day  nicotine -  14 mG/24Hr(s) Patch 1 patch Transdermal daily  rivaroxaban 20 milliGRAM(s) Oral with dinner  senna 2 Tablet(s) Oral two times a day  sertraline 50 milliGRAM(s) Oral daily  sodium chloride 0.9%. 1000 milliLiter(s) (20 mL/Hr) IV Continuous <Continuous>  voriconazole 200 milliGRAM(s) Oral every 12 hours    MEDICATIONS  (PRN):  acetaminophen   Tablet .. 650 milliGRAM(s) Oral every 6 hours PRN Mild Pain (1 - 3)  oxyCODONE    5 mG/acetaminophen 325 mG 1 Tablet(s) Oral every 4 hours PRN Moderate Pain (4 - 6)  polyethylene glycol 3350 17 Gram(s) Oral two times a day PRN Constipation      Allergies    No Known Allergies    Intolerances        Vital Signs Last 24 Hrs  T(C): 35.7 (29 Jul 2019 05:53), Max: 36.7 (28 Jul 2019 21:00)  T(F): 96.3 (29 Jul 2019 05:53), Max: 98.1 (28 Jul 2019 21:00)  HR: 85 (29 Jul 2019 05:53) (69 - 99)  BP: 106/52 (29 Jul 2019 05:53) (104/67 - 116/75)  BP(mean): --  RR: 18 (29 Jul 2019 05:53) (18 - 18)  SpO2: 96% (29 Jul 2019 05:53) (93% - 96%)    I&O's Summary    28 Jul 2019 07:01  -  29 Jul 2019 07:00  --------------------------------------------------------  IN: 1150 mL / OUT: 3125 mL / NET: -1975 mL        ON EXAM:    General: NAD, awake and alert, oriented x 3  HEENT: Mucous membranes are moist, anicteric  Lungs: Non-labored, breath sounds are clear bilaterally, No wheezing, rales or rhonchi  Cardiovascular: Regular, S1 and S2, no murmurs, rubs, or gallops  Gastrointestinal: Bowel Sounds present, soft, nontender.   Lymph: No peripheral edema. No lymphadenopathy.  Skin: No rashes or ulcers  Psych:  Mood & affect appropriate    LABS: All Labs Reviewed:                        8.1    8.0   )-----------( 80       ( 28 Jul 2019 06:44 )             24.1                         8.7    9.9   )-----------( 106      ( 27 Jul 2019 07:00 )             25.9                         9.1    6.9   )-----------( 110      ( 26 Jul 2019 09:36 )             27.1     28 Jul 2019 06:44    137    |  101    |  20     ----------------------------<  103    4.5     |  24     |  1.19   27 Jul 2019 06:58    137    |  98     |  16     ----------------------------<  118    5.0     |  21     |  1.22     Ca    8.8        28 Jul 2019 06:44  Ca    9.1        27 Jul 2019 06:58  Phos  4.2       28 Jul 2019 06:44  Phos  4.0       27 Jul 2019 06:58  Mg     1.9       28 Jul 2019 06:44  Mg     2.0       27 Jul 2019 06:58    TPro  5.8    /  Alb  3.6    /  TBili  0.1    /  DBili  x      /  AST  23     /  ALT  17     /  AlkPhos  61     28 Jul 2019 06:44  TPro  6.0    /  Alb  3.9    /  TBili  0.2    /  DBili  x      /  AST  18     /  ALT  13     /  AlkPhos  70     27 Jul 2019 06:58          Blood Culture: Organism --  Gram Stain Blood -- Gram Stain   Rare polymorphonuclear leukocytes seen per low power field  No organisms seen per oil power field  Specimen Source .Tissue CLEAR MARGIN BONE, RIGHT  Culture-Blood --

## 2019-07-29 NOTE — PROGRESS NOTE ADULT - PROBLEM SELECTOR PLAN 5
If febrile Pan Cx and CXR  Continue Ceftriaxone 2gm IV q day for 6 weeks (through 8/21 for osteomyelitis)  Continue Voriconazole for Aspergillus of face. With previous DVT and PE   Xarelto was on previously on hold for toe amputation, restarted on 7/27  Hold when PLT <50K

## 2019-07-29 NOTE — PROGRESS NOTE ADULT - ATTENDING COMMENTS
60yo M with AML in CR admitted for right big toe amputation due to osteomyelitis, done 7/26.   Podiatry following -weight bear as tolerated in surgical shoe and keep dressing clean dry and intact   Resumed Xarelto  Plan to start HIDAC on 7/29 60yo M with AML in CR admitted for right big toe amputation due to osteomyelitis, done 7/26.   Podiatry following -weight bear as tolerated in surgical shoe and keep dressing clean dry and intact   Resumed Xarelto  Plan to start HIDAC today

## 2019-07-30 ENCOUNTER — APPOINTMENT (OUTPATIENT)
Dept: CARDIOLOGY | Facility: CLINIC | Age: 59
End: 2019-07-30

## 2019-07-30 LAB
ALBUMIN SERPL ELPH-MCNC: 3.4 G/DL — SIGNIFICANT CHANGE UP (ref 3.3–5)
ALP SERPL-CCNC: 66 U/L — SIGNIFICANT CHANGE UP (ref 40–120)
ALT FLD-CCNC: 19 U/L — SIGNIFICANT CHANGE UP (ref 10–45)
ANION GAP SERPL CALC-SCNC: 10 MMOL/L — SIGNIFICANT CHANGE UP (ref 5–17)
AST SERPL-CCNC: 20 U/L — SIGNIFICANT CHANGE UP (ref 10–40)
BASOPHILS # BLD AUTO: 0 K/UL — SIGNIFICANT CHANGE UP (ref 0–0.2)
BASOPHILS NFR BLD AUTO: 0.4 % — SIGNIFICANT CHANGE UP (ref 0–2)
BILIRUB SERPL-MCNC: 0.2 MG/DL — SIGNIFICANT CHANGE UP (ref 0.2–1.2)
BUN SERPL-MCNC: 18 MG/DL — SIGNIFICANT CHANGE UP (ref 7–23)
CALCIUM SERPL-MCNC: 8.6 MG/DL — SIGNIFICANT CHANGE UP (ref 8.4–10.5)
CHLORIDE SERPL-SCNC: 104 MMOL/L — SIGNIFICANT CHANGE UP (ref 96–108)
CO2 SERPL-SCNC: 23 MMOL/L — SIGNIFICANT CHANGE UP (ref 22–31)
CREAT SERPL-MCNC: 1.26 MG/DL — SIGNIFICANT CHANGE UP (ref 0.5–1.3)
EOSINOPHIL # BLD AUTO: 0.1 K/UL — SIGNIFICANT CHANGE UP (ref 0–0.5)
EOSINOPHIL NFR BLD AUTO: 1.2 % — SIGNIFICANT CHANGE UP (ref 0–6)
GLUCOSE SERPL-MCNC: 99 MG/DL — SIGNIFICANT CHANGE UP (ref 70–99)
HCT VFR BLD CALC: 26.5 % — LOW (ref 39–50)
HGB BLD-MCNC: 8.8 G/DL — LOW (ref 13–17)
LYMPHOCYTES # BLD AUTO: 1.6 K/UL — SIGNIFICANT CHANGE UP (ref 1–3.3)
LYMPHOCYTES # BLD AUTO: 24.5 % — SIGNIFICANT CHANGE UP (ref 13–44)
MAGNESIUM SERPL-MCNC: 1.8 MG/DL — SIGNIFICANT CHANGE UP (ref 1.6–2.6)
MCHC RBC-ENTMCNC: 33.1 PG — SIGNIFICANT CHANGE UP (ref 27–34)
MCHC RBC-ENTMCNC: 33.4 GM/DL — SIGNIFICANT CHANGE UP (ref 32–36)
MCV RBC AUTO: 99.2 FL — SIGNIFICANT CHANGE UP (ref 80–100)
MONOCYTES # BLD AUTO: 0.5 K/UL — SIGNIFICANT CHANGE UP (ref 0–0.9)
MONOCYTES NFR BLD AUTO: 8.2 % — SIGNIFICANT CHANGE UP (ref 2–14)
NEUTROPHILS # BLD AUTO: 4.2 K/UL — SIGNIFICANT CHANGE UP (ref 1.8–7.4)
NEUTROPHILS NFR BLD AUTO: 65.7 % — SIGNIFICANT CHANGE UP (ref 43–77)
PHOSPHATE SERPL-MCNC: 4.4 MG/DL — SIGNIFICANT CHANGE UP (ref 2.5–4.5)
PLATELET # BLD AUTO: 82 K/UL — LOW (ref 150–400)
POTASSIUM SERPL-MCNC: 4.6 MMOL/L — SIGNIFICANT CHANGE UP (ref 3.5–5.3)
POTASSIUM SERPL-SCNC: 4.6 MMOL/L — SIGNIFICANT CHANGE UP (ref 3.5–5.3)
PROT SERPL-MCNC: 5.7 G/DL — LOW (ref 6–8.3)
RBC # BLD: 2.67 M/UL — LOW (ref 4.2–5.8)
RBC # FLD: 16.1 % — HIGH (ref 10.3–14.5)
SODIUM SERPL-SCNC: 137 MMOL/L — SIGNIFICANT CHANGE UP (ref 135–145)
WBC # BLD: 6.4 K/UL — SIGNIFICANT CHANGE UP (ref 3.8–10.5)
WBC # FLD AUTO: 6.4 K/UL — SIGNIFICANT CHANGE UP (ref 3.8–10.5)

## 2019-07-30 PROCEDURE — 99232 SBSQ HOSP IP/OBS MODERATE 35: CPT

## 2019-07-30 RX ORDER — MAGNESIUM SULFATE 500 MG/ML
1 VIAL (ML) INJECTION ONCE
Refills: 0 | Status: COMPLETED | OUTPATIENT
Start: 2019-07-30 | End: 2019-07-30

## 2019-07-30 RX ADMIN — FINASTERIDE 5 MILLIGRAM(S): 5 TABLET, FILM COATED ORAL at 13:03

## 2019-07-30 RX ADMIN — Medication 2 DROP(S): at 06:46

## 2019-07-30 RX ADMIN — ONDANSETRON 8 MILLIGRAM(S): 8 TABLET, FILM COATED ORAL at 06:47

## 2019-07-30 RX ADMIN — Medication 2 DROP(S): at 01:00

## 2019-07-30 RX ADMIN — SERTRALINE 50 MILLIGRAM(S): 25 TABLET, FILM COATED ORAL at 13:03

## 2019-07-30 RX ADMIN — Medication 12.5 MILLIGRAM(S): at 09:46

## 2019-07-30 RX ADMIN — Medication 100 MILLIGRAM(S): at 13:03

## 2019-07-30 RX ADMIN — VORICONAZOLE 200 MILLIGRAM(S): 10 INJECTION, POWDER, LYOPHILIZED, FOR SOLUTION INTRAVENOUS at 18:20

## 2019-07-30 RX ADMIN — Medication 1 PATCH: at 13:10

## 2019-07-30 RX ADMIN — SENNA PLUS 2 TABLET(S): 8.6 TABLET ORAL at 06:46

## 2019-07-30 RX ADMIN — Medication 187.17 MILLIGRAM(S): at 02:35

## 2019-07-30 RX ADMIN — Medication 100 GRAM(S): at 13:02

## 2019-07-30 RX ADMIN — Medication 1 PATCH: at 18:20

## 2019-07-30 RX ADMIN — Medication 2 DROP(S): at 13:03

## 2019-07-30 RX ADMIN — Medication 2 DROP(S): at 18:20

## 2019-07-30 RX ADMIN — RIVAROXABAN 20 MILLIGRAM(S): KIT at 18:19

## 2019-07-30 RX ADMIN — Medication 1 PATCH: at 18:19

## 2019-07-30 RX ADMIN — CEFTRIAXONE 100 MILLIGRAM(S): 500 INJECTION, POWDER, FOR SOLUTION INTRAMUSCULAR; INTRAVENOUS at 21:05

## 2019-07-30 RX ADMIN — VORICONAZOLE 200 MILLIGRAM(S): 10 INJECTION, POWDER, LYOPHILIZED, FOR SOLUTION INTRAVENOUS at 06:46

## 2019-07-30 RX ADMIN — ONDANSETRON 8 MILLIGRAM(S): 8 TABLET, FILM COATED ORAL at 22:49

## 2019-07-30 RX ADMIN — ONDANSETRON 8 MILLIGRAM(S): 8 TABLET, FILM COATED ORAL at 13:02

## 2019-07-30 RX ADMIN — Medication 100 MILLIGRAM(S): at 06:46

## 2019-07-30 RX ADMIN — Medication 1 PATCH: at 20:16

## 2019-07-30 NOTE — PROGRESS NOTE ADULT - ATTENDING COMMENTS
58yo M with AML in CR admitted for right big toe amputation due to osteomyelitis, done 7/26.   Podiatry following -weight bear as tolerated in surgical shoe and keep dressing clean dry and intact   Resumed Xarelto  Plan to start HIDAC today 58yo M with AML in CR admitted for right big toe amputation due to osteomyelitis, done 7/26.   Podiatry following -weight bear as tolerated in surgical shoe and keep dressing clean dry and intact   Resumed Xarelto  Started HIDAC consolidation, today is day 2 of therapy.   Patient with mild fatigue but otherwise doing well.   Supportive care.

## 2019-07-30 NOTE — PROGRESS NOTE ADULT - PROBLEM SELECTOR PLAN 5
With previous DVT and PE   Xarelto was on previously on hold for toe amputation, restarted on 7/27  Hold when PLT <50K

## 2019-07-30 NOTE — ADVANCED PRACTICE NURSE CONSULT - ASSESSMENT
Pt. denies any discomfort at t his time. Reinforved Chemo teachings; Pt. verbalized understanding of chemotherapy infusion, possible side effects of chemotherapy. Drug verification done by 2 RN.'s.Pt. has right upper arm PICC .Site WNL,with positive blood return noted and flushing easily with 10 ml NS.Dsg dry and intact.Nystagmus check done with negative result. @ 0235( 2nd dose) Day 1/5 Cytarabine 3gm/r0=8008 mg IV  infusing well over 3 hours to PICC via alaris pump.Pt. tolerating well. No c/o, will continue to monitor.

## 2019-07-30 NOTE — PROGRESS NOTE ADULT - PROBLEM SELECTOR PLAN 1
Start Cycle 1 consolidation HiDAc today  Monitor CBC/Lytes and transfuse replete PRN  Strict Is and Os/Daily weights/Mouth Care  Monitor for cerebellar toxicity  Pred forte eye drops  IVF  Antiemetics. Started Cycle 1 consolidation HiDAc   Monitor CBC/Lytes and transfuse replete PRN  Keep K>4 and Mg>2  Mag sulfate 1g IVPB x 1 for mild hypomagnesemia   Strict Is and Os/Daily weights/Mouth Care  Monitor for cerebellar toxicity  Pred forte eye drops  IVF  Antiemetics.

## 2019-07-30 NOTE — PROGRESS NOTE ADULT - PROBLEM SELECTOR PLAN 3
Status post Right great toe amputation 7/26  Continue abx  Podiatry folowing Status post Right great toe amputation 7/26  Continue abx  Podiatry following

## 2019-07-30 NOTE — PROGRESS NOTE ADULT - PROBLEM SELECTOR PLAN 2
If febrile Pan Cx and CXR  Continue Ceftriaxone 2gm IV q day for 6 weeks (through 8/21 for osteomyelitis)  Continue Voriconazole for Aspergillus of face. Pt is not neutropenic   If febrile, Pan Cx and CXR  Continue Ceftriaxone 2gm IV q day for 6 weeks (through 8/21 for osteomyelitis)  Continue Voriconazole for Aspergillus of face.  ID Dr Gordon Ford consult/fu to decide abx and duration

## 2019-07-30 NOTE — PROGRESS NOTE ADULT - ASSESSMENT
This is a 58yo M with PMHx of HFrEF (recent EF 28%), COLLEEN, DVT/PE, HTN, Factor V Leiden and AML diagnosed in 4/2019 status post induction chemotherapy now in morphologic remission admitted for amputation of right great toe due to osteomyelitis to be followed by consolidation chemotherapy with cycle 1 HiDAc. This is a 60yo M with PMHx of HFrEF (recent EF 28%), COLLEEN, DVT/PE, HTN, Factor V Leiden and AML diagnosed in 4/2019 status post induction chemotherapy now in morphologic remission admitted for amputation of right great toe due(done on 7/26) to osteomyelitis to be followed by consolidation chemotherapy with cycle 1 HiDAc. Pt has anemia and thrombocytopenia due to chemo and or disease.

## 2019-07-30 NOTE — PROGRESS NOTE ADULT - PROBLEM SELECTOR PLAN 4
7/12 MUGA with EF 28%  Continue Toprol XL 12.5 mg PO q 12   Keep K>4 and Mg>2  Continue Toprol XL 12.5mg PO BID  Cardiology following

## 2019-07-30 NOTE — PROGRESS NOTE ADULT - SUBJECTIVE AND OBJECTIVE BOX
[facility] - Ede Zamudio, Riki, Edgar, Gem, Tabitha Garcia  Office Number:  [phone]    Patient resting comfortably in bed in NAD.  Laying flat with no respiratory distress.  No complaints of chest pain, dyspnea, palpitations, PND, or orthopnea.    ROS: negative unless otherwise mentioned.    Telemetry:  off    MEDICATIONS  (STANDING):  cefTRIAXone   IVPB 2000 milliGRAM(s) IV Intermittent every 24 hours  chlorhexidine 2% Cloths 1 Application(s) Topical <User Schedule>  docusate sodium 100 milliGRAM(s) Oral three times a day  finasteride 5 milliGRAM(s) Oral daily  metoprolol succinate ER 12.5 milliGRAM(s) Oral two times a day  nicotine -  14 mG/24Hr(s) Patch 1 patch Transdermal daily  ondansetron Injectable 8 milliGRAM(s) IV Push every 8 hours  prednisoLONE acetate 1% Suspension 2 Drop(s) Both EYES every 6 hours  rivaroxaban 20 milliGRAM(s) Oral with dinner  senna 2 Tablet(s) Oral two times a day  sertraline 50 milliGRAM(s) Oral daily  sodium chloride 0.9%. 1000 milliLiter(s) (20 mL/Hr) IV Continuous <Continuous>  voriconazole 200 milliGRAM(s) Oral every 12 hours    MEDICATIONS  (PRN):  acetaminophen   Tablet .. 650 milliGRAM(s) Oral every 6 hours PRN Mild Pain (1 - 3)  metoclopramide Injectable 10 milliGRAM(s) IV Push every 6 hours PRN nausea and or vomiting  oxyCODONE    5 mG/acetaminophen 325 mG 1 Tablet(s) Oral every 4 hours PRN Moderate Pain (4 - 6)  polyethylene glycol 3350 17 Gram(s) Oral two times a day PRN Constipation      Allergies    No Known Allergies    Intolerances        Vital Signs Last 24 Hrs  T(C): 36.4 (30 Jul 2019 05:43), Max: 36.6 (29 Jul 2019 10:15)  T(F): 97.5 (30 Jul 2019 05:43), Max: 97.9 (29 Jul 2019 10:15)  HR: 78 (30 Jul 2019 05:43) (70 - 86)  BP: 104/64 (30 Jul 2019 05:43) (100/59 - 124/74)  BP(mean): --  RR: 18 (30 Jul 2019 05:43) (16 - 18)  SpO2: 95% (30 Jul 2019 05:43) (95% - 98%)    I&O's Summary    29 Jul 2019 07:01  -  30 Jul 2019 07:00  --------------------------------------------------------  IN: 3856 mL / OUT: 3000 mL / NET: 856 mL        ON EXAM:  General: NAD, awake and alert, oriented x 3  HEENT: Mucous membranes are moist, anicteric  Lungs: Non-labored, breath sounds are clear bilaterally, No wheezing, rales or rhonchi  Cardiovascular: Regular, S1 and S2, no murmurs, rubs, or gallops  Gastrointestinal: Bowel Sounds present, soft, nontender.   Lymph: No peripheral edema. No lymphadenopathy.  Skin: No rashes or ulcers  Psych:  Mood & affect appropriate    LABS: All Labs Reviewed:                        8.8    6.4   )-----------( 82       ( 30 Jul 2019 07:09 )             26.5                         8.4    7.0   )-----------( 87       ( 29 Jul 2019 07:19 )             25.6                         8.1    8.0   )-----------( 80       ( 28 Jul 2019 06:44 )             24.1     30 Jul 2019 07:09    137    |  104    |  18     ----------------------------<  99     4.6     |  23     |  1.26   29 Jul 2019 07:18    139    |  104    |  19     ----------------------------<  106    4.1     |  23     |  1.19   28 Jul 2019 06:44    137    |  101    |  20     ----------------------------<  103    4.5     |  24     |  1.19     Ca    8.6        30 Jul 2019 07:09  Ca    8.9        29 Jul 2019 07:18  Ca    8.8        28 Jul 2019 06:44  Phos  4.4       30 Jul 2019 07:09  Phos  3.9       29 Jul 2019 07:18  Phos  4.2       28 Jul 2019 06:44  Mg     1.8       30 Jul 2019 07:09  Mg     1.8       29 Jul 2019 07:18  Mg     1.9       28 Jul 2019 06:44    TPro  5.7    /  Alb  3.4    /  TBili  0.2    /  DBili  x      /  AST  20     /  ALT  19     /  AlkPhos  66     30 Jul 2019 07:09  TPro  5.8    /  Alb  3.5    /  TBili  <0.1   /  DBili  x      /  AST  19     /  ALT  18     /  AlkPhos  64     29 Jul 2019 07:18  TPro  5.8    /  Alb  3.6    /  TBili  0.1    /  DBili  x      /  AST  23     /  ALT  17     /  AlkPhos  61     28 Jul 2019 06:44          Blood Culture: Organism --  Gram Stain Blood -- Gram Stain   Rare polymorphonuclear leukocytes seen per low power field  No organisms seen per oil power field  Specimen Source .Tissue CLEAR MARGIN BONE, RIGHT  Culture-Blood --

## 2019-07-30 NOTE — PROGRESS NOTE ADULT - SUBJECTIVE AND OBJECTIVE BOX
Diagnosis: AML    Protocol/Chemo Regimen:  Cycle 1 of HiDAc    Day: 2    Pt endorsed: Feeling well this am    Review of Systems: Denies any chest pain, palpitation, SOB, abdominal pain, nausea, vomiting or diarrhea.    Pain scale: Denies    Diet: Regular    Allergies: No Known Allergies          ANTIMICROBIALS  cefTRIAXone   IVPB 2000 milliGRAM(s) IV Intermittent every 24 hours  voriconazole 200 milliGRAM(s) Oral every 12 hours      HEME/ONC MEDICATIONS  rivaroxaban 20 milliGRAM(s) Oral with dinner      STANDING MEDICATIONS  chlorhexidine 2% Cloths 1 Application(s) Topical <User Schedule>  docusate sodium 100 milliGRAM(s) Oral three times a day  finasteride 5 milliGRAM(s) Oral daily  metoprolol succinate ER 12.5 milliGRAM(s) Oral two times a day  nicotine -  14 mG/24Hr(s) Patch 1 patch Transdermal daily  ondansetron Injectable 8 milliGRAM(s) IV Push every 8 hours  prednisoLONE acetate 1% Suspension 2 Drop(s) Both EYES every 6 hours  senna 2 Tablet(s) Oral two times a day  sertraline 50 milliGRAM(s) Oral daily  sodium chloride 0.9%. 1000 milliLiter(s) IV Continuous <Continuous>      PRN MEDICATIONS  acetaminophen   Tablet .. 650 milliGRAM(s) Oral every 6 hours PRN  metoclopramide Injectable 10 milliGRAM(s) IV Push every 6 hours PRN  oxyCODONE    5 mG/acetaminophen 325 mG 1 Tablet(s) Oral every 4 hours PRN  polyethylene glycol 3350 17 Gram(s) Oral two times a day PRN        Vital Signs Last 24 Hrs  T(C): 36.4 (30 Jul 2019 05:43), Max: 36.6 (29 Jul 2019 10:15)  T(F): 97.5 (30 Jul 2019 05:43), Max: 97.9 (29 Jul 2019 10:15)  HR: 78 (30 Jul 2019 05:43) (70 - 86)  BP: 104/64 (30 Jul 2019 05:43) (100/59 - 124/74)  BP(mean): --  RR: 18 (30 Jul 2019 05:43) (16 - 18)  SpO2: 95% (30 Jul 2019 05:43) (95% - 98%)      PHYSICAL EXAM  General: NAD  HEENT: PERRLA, EOMI, clear oropharynx  Neck: supple  CV: (+) S1/S2 RRR  Lungs: clear to auscultation, no wheezes or rales  Abdomen: soft, non-tender, non-distended (+) BS  Ext: no edema. R great toe with DSD  Skin: no rashes   Neuro: alert and oriented X 3, no focal deficits  Central Line: C/D/I      LABS:                        8.4    7.0   )-----------( 87       ( 29 Jul 2019 07:19 )             25.6         Mean Cell Volume : 99.2 fl  Mean Cell Hemoglobin : 32.8 pg  Mean Cell Hemoglobin Concentration : 33.1 gm/dL  Auto Neutrophil # : 3.6 K/uL  Auto Lymphocyte # : 2.4 K/uL  Auto Monocyte # : 0.8 K/uL  Auto Eosinophil # : 0.1 K/uL  Auto Basophil # : 0.0 K/uL  Auto Neutrophil % : 52.0 %  Auto Lymphocyte % : 34.6 %  Auto Monocyte % : 11.4 %  Auto Eosinophil % : 1.8 %  Auto Basophil % : 0.2 %      07-29    139  |  104  |  19  ----------------------------<  106<H>  4.1   |  23  |  1.19    Ca    8.9      29 Jul 2019 07:18  Phos  3.9     07-29  Mg     1.8     07-29    TPro  5.8<L>  /  Alb  3.5  /  TBili  <0.1<L>  /  DBili  x   /  AST  19  /  ALT  18  /  AlkPhos  64  07-29                  RECENT CULTURES:  07-26 @ 23:06  .Tissue CLEAR MARGIN BONE, RIGHT  --  --  --    No growth  --      RADIOLOGY & ADDITIONAL STUDIES:    EXAM:  FOOT COMPLETE RIGHT (MIN 3 VIEW)                        PROCEDURE DATE:  07/26/2019    Findings: Patient is status post amputation of the first toe to the level of the   mid shaft of the first proximal phalanx. Surgical margins are sharp.   There is surrounding soft tissue edema. The joint spaces are preserved.   There is atherosclerotic disease. Diagnosis: AML    Protocol/Chemo Regimen:  Cycle 1 of HiDAc    Day: 2    Pt endorsed: tiredness     Review of Systems: Denies any chest pain, palpitation, SOB, abdominal pain, nausea, vomiting or diarrhea.    Pain scale: Denies    Diet: Regular    Allergies: No Known Allergies      ANTIMICROBIALS  cefTRIAXone   IVPB 2000 milliGRAM(s) IV Intermittent every 24 hours  voriconazole 200 milliGRAM(s) Oral every 12 hours      HEME/ONC MEDICATIONS  rivaroxaban 20 milliGRAM(s) Oral with dinner      STANDING MEDICATIONS  chlorhexidine 2% Cloths 1 Application(s) Topical <User Schedule>  docusate sodium 100 milliGRAM(s) Oral three times a day  finasteride 5 milliGRAM(s) Oral daily  metoprolol succinate ER 12.5 milliGRAM(s) Oral two times a day  nicotine -  14 mG/24Hr(s) Patch 1 patch Transdermal daily  ondansetron Injectable 8 milliGRAM(s) IV Push every 8 hours  prednisoLONE acetate 1% Suspension 2 Drop(s) Both EYES every 6 hours  senna 2 Tablet(s) Oral two times a day  sertraline 50 milliGRAM(s) Oral daily  sodium chloride 0.9%. 1000 milliLiter(s) IV Continuous <Continuous>      PRN MEDICATIONS  acetaminophen   Tablet .. 650 milliGRAM(s) Oral every 6 hours PRN  metoclopramide Injectable 10 milliGRAM(s) IV Push every 6 hours PRN  oxyCODONE    5 mG/acetaminophen 325 mG 1 Tablet(s) Oral every 4 hours PRN  polyethylene glycol 3350 17 Gram(s) Oral two times a day PRN      Vital Signs Last 24 Hrs  T(C): 36.4 (30 Jul 2019 05:43), Max: 36.6 (29 Jul 2019 10:15)  T(F): 97.5 (30 Jul 2019 05:43), Max: 97.9 (29 Jul 2019 10:15)  HR: 78 (30 Jul 2019 05:43) (70 - 86)  BP: 104/64 (30 Jul 2019 05:43) (100/59 - 124/74)  BP(mean): --  RR: 18 (30 Jul 2019 05:43) (16 - 18)  SpO2: 95% (30 Jul 2019 05:43) (95% - 98%)      PHYSICAL EXAM  General: NAD  HEENT: PERRLA, EOMI, clear oropharynx  Neck: supple  CV: (+) S1/S2 RRR  Lungs: clear to auscultation, no wheezes or rales  Abdomen: soft, non-tender, non-distended (+) BS  Ext: no edema. R great toe with DSD C/D/I  Skin: no rash  Neuro: alert and oriented X 3, no focal deficits  Central Line: C/D/I        LABS:                        8.8    6.4   )-----------( 82       ( 30 Jul 2019 07:09 )             26.5         Mean Cell Volume : 99.2 fl  Mean Cell Hemoglobin : 33.1 pg  Mean Cell Hemoglobin Concentration : 33.4 gm/dL  Auto Neutrophil # : 4.2 K/uL  Auto Lymphocyte # : 1.6 K/uL  Auto Monocyte # : 0.5 K/uL  Auto Eosinophil # : 0.1 K/uL  Auto Basophil # : 0.0 K/uL  Auto Neutrophil % : 65.7 %  Auto Lymphocyte % : 24.5 %  Auto Monocyte % : 8.2 %  Auto Eosinophil % : 1.2 %  Auto Basophil % : 0.4 %      07-30    137  |  104  |  18  ----------------------------<  99  4.6   |  23  |  1.26    Ca    8.6      30 Jul 2019 07:09  Phos  4.4     07-30  Mg     1.8     07-30    TPro  5.7<L>  /  Alb  3.4  /  TBili  0.2  /  DBili  x   /  AST  20  /  ALT  19  /  AlkPhos  66  07-30      RECENT CULTURES:  07-26 @ 23:06  .Tissue CLEAR MARGIN BONE, RIGHT  --  --  --    No growth  --      RADIOLOGY & ADDITIONAL STUDIES:    EXAM:  FOOT COMPLETE RIGHT (MIN 3 VIEW)                        PROCEDURE DATE:  07/26/2019    Findings: Patient is status post amputation of the first toe to the level of the   mid shaft of the first proximal phalanx. Surgical margins are sharp.   There is surrounding soft tissue edema. The joint spaces are preserved.   There is atherosclerotic disease.

## 2019-07-31 LAB
ALBUMIN SERPL ELPH-MCNC: 3.7 G/DL — SIGNIFICANT CHANGE UP (ref 3.3–5)
ALP SERPL-CCNC: 68 U/L — SIGNIFICANT CHANGE UP (ref 40–120)
ALT FLD-CCNC: 16 U/L — SIGNIFICANT CHANGE UP (ref 10–45)
ANION GAP SERPL CALC-SCNC: 15 MMOL/L — SIGNIFICANT CHANGE UP (ref 5–17)
AST SERPL-CCNC: 16 U/L — SIGNIFICANT CHANGE UP (ref 10–40)
BASOPHILS # BLD AUTO: 0 K/UL — SIGNIFICANT CHANGE UP (ref 0–0.2)
BASOPHILS NFR BLD AUTO: 0.2 % — SIGNIFICANT CHANGE UP (ref 0–2)
BILIRUB SERPL-MCNC: 0.3 MG/DL — SIGNIFICANT CHANGE UP (ref 0.2–1.2)
BUN SERPL-MCNC: 19 MG/DL — SIGNIFICANT CHANGE UP (ref 7–23)
CALCIUM SERPL-MCNC: 9 MG/DL — SIGNIFICANT CHANGE UP (ref 8.4–10.5)
CHLORIDE SERPL-SCNC: 103 MMOL/L — SIGNIFICANT CHANGE UP (ref 96–108)
CO2 SERPL-SCNC: 23 MMOL/L — SIGNIFICANT CHANGE UP (ref 22–31)
CREAT SERPL-MCNC: 1.33 MG/DL — HIGH (ref 0.5–1.3)
CULTURE RESULTS: SIGNIFICANT CHANGE UP
EOSINOPHIL # BLD AUTO: 0 K/UL — SIGNIFICANT CHANGE UP (ref 0–0.5)
EOSINOPHIL NFR BLD AUTO: 1.3 % — SIGNIFICANT CHANGE UP (ref 0–6)
GLUCOSE SERPL-MCNC: 120 MG/DL — HIGH (ref 70–99)
HCT VFR BLD CALC: 26.5 % — LOW (ref 39–50)
HGB BLD-MCNC: 8.7 G/DL — LOW (ref 13–17)
LYMPHOCYTES # BLD AUTO: 0.8 K/UL — LOW (ref 1–3.3)
LYMPHOCYTES # BLD AUTO: 21.4 % — SIGNIFICANT CHANGE UP (ref 13–44)
MAGNESIUM SERPL-MCNC: 1.8 MG/DL — SIGNIFICANT CHANGE UP (ref 1.6–2.6)
MCHC RBC-ENTMCNC: 32.8 PG — SIGNIFICANT CHANGE UP (ref 27–34)
MCHC RBC-ENTMCNC: 32.9 GM/DL — SIGNIFICANT CHANGE UP (ref 32–36)
MCV RBC AUTO: 99.6 FL — SIGNIFICANT CHANGE UP (ref 80–100)
MONOCYTES # BLD AUTO: 0.2 K/UL — SIGNIFICANT CHANGE UP (ref 0–0.9)
MONOCYTES NFR BLD AUTO: 4.7 % — SIGNIFICANT CHANGE UP (ref 2–14)
NEUTROPHILS # BLD AUTO: 2.6 K/UL — SIGNIFICANT CHANGE UP (ref 1.8–7.4)
NEUTROPHILS NFR BLD AUTO: 72.4 % — SIGNIFICANT CHANGE UP (ref 43–77)
PHOSPHATE SERPL-MCNC: 4.4 MG/DL — SIGNIFICANT CHANGE UP (ref 2.5–4.5)
PLATELET # BLD AUTO: 69 K/UL — LOW (ref 150–400)
POTASSIUM SERPL-MCNC: 4.3 MMOL/L — SIGNIFICANT CHANGE UP (ref 3.5–5.3)
POTASSIUM SERPL-SCNC: 4.3 MMOL/L — SIGNIFICANT CHANGE UP (ref 3.5–5.3)
PROT SERPL-MCNC: 5.9 G/DL — LOW (ref 6–8.3)
RBC # BLD: 2.66 M/UL — LOW (ref 4.2–5.8)
RBC # FLD: 15.8 % — HIGH (ref 10.3–14.5)
SODIUM SERPL-SCNC: 141 MMOL/L — SIGNIFICANT CHANGE UP (ref 135–145)
SPECIMEN SOURCE: SIGNIFICANT CHANGE UP
WBC # BLD: 3.6 K/UL — LOW (ref 3.8–10.5)
WBC # FLD AUTO: 3.6 K/UL — LOW (ref 3.8–10.5)

## 2019-07-31 PROCEDURE — 99232 SBSQ HOSP IP/OBS MODERATE 35: CPT

## 2019-07-31 PROCEDURE — 99232 SBSQ HOSP IP/OBS MODERATE 35: CPT | Mod: GC

## 2019-07-31 RX ORDER — OXYCODONE HYDROCHLORIDE 5 MG/1
5 TABLET ORAL EVERY 4 HOURS
Refills: 0 | Status: DISCONTINUED | OUTPATIENT
Start: 2019-07-31 | End: 2019-08-03

## 2019-07-31 RX ORDER — CYTARABINE 100 MG
6150 VIAL (EA) INJECTION EVERY 12 HOURS
Refills: 0 | Status: COMPLETED | OUTPATIENT
Start: 2019-07-31 | End: 2019-08-01

## 2019-07-31 RX ORDER — LANOLIN ALCOHOL/MO/W.PET/CERES
3 CREAM (GRAM) TOPICAL ONCE
Refills: 0 | Status: COMPLETED | OUTPATIENT
Start: 2019-07-31 | End: 2019-07-31

## 2019-07-31 RX ORDER — MAGNESIUM SULFATE 500 MG/ML
1 VIAL (ML) INJECTION ONCE
Refills: 0 | Status: COMPLETED | OUTPATIENT
Start: 2019-07-31 | End: 2019-07-31

## 2019-07-31 RX ADMIN — Medication 2 DROP(S): at 12:27

## 2019-07-31 RX ADMIN — ONDANSETRON 8 MILLIGRAM(S): 8 TABLET, FILM COATED ORAL at 06:13

## 2019-07-31 RX ADMIN — Medication 2 DROP(S): at 06:15

## 2019-07-31 RX ADMIN — Medication 30 MILLILITER(S): at 18:56

## 2019-07-31 RX ADMIN — Medication 1 PATCH: at 18:00

## 2019-07-31 RX ADMIN — Medication 187.17 MILLIGRAM(S): at 13:56

## 2019-07-31 RX ADMIN — Medication 1 PATCH: at 07:18

## 2019-07-31 RX ADMIN — Medication 100 GRAM(S): at 15:54

## 2019-07-31 RX ADMIN — VORICONAZOLE 200 MILLIGRAM(S): 10 INJECTION, POWDER, LYOPHILIZED, FOR SOLUTION INTRAVENOUS at 17:39

## 2019-07-31 RX ADMIN — SERTRALINE 50 MILLIGRAM(S): 25 TABLET, FILM COATED ORAL at 12:28

## 2019-07-31 RX ADMIN — Medication 100 MILLIGRAM(S): at 06:18

## 2019-07-31 RX ADMIN — Medication 2 DROP(S): at 17:39

## 2019-07-31 RX ADMIN — ONDANSETRON 8 MILLIGRAM(S): 8 TABLET, FILM COATED ORAL at 21:40

## 2019-07-31 RX ADMIN — Medication 100 MILLIGRAM(S): at 13:21

## 2019-07-31 RX ADMIN — VORICONAZOLE 200 MILLIGRAM(S): 10 INJECTION, POWDER, LYOPHILIZED, FOR SOLUTION INTRAVENOUS at 06:15

## 2019-07-31 RX ADMIN — Medication 2 DROP(S): at 00:47

## 2019-07-31 RX ADMIN — SODIUM CHLORIDE 20 MILLILITER(S): 9 INJECTION INTRAMUSCULAR; INTRAVENOUS; SUBCUTANEOUS at 06:15

## 2019-07-31 RX ADMIN — CHLORHEXIDINE GLUCONATE 1 APPLICATION(S): 213 SOLUTION TOPICAL at 08:44

## 2019-07-31 RX ADMIN — RIVAROXABAN 20 MILLIGRAM(S): KIT at 17:39

## 2019-07-31 RX ADMIN — CEFTRIAXONE 100 MILLIGRAM(S): 500 INJECTION, POWDER, FOR SOLUTION INTRAMUSCULAR; INTRAVENOUS at 21:41

## 2019-07-31 RX ADMIN — Medication 650 MILLIGRAM(S): at 19:41

## 2019-07-31 RX ADMIN — Medication 100 MILLIGRAM(S): at 21:40

## 2019-07-31 RX ADMIN — Medication 3 MILLIGRAM(S): at 21:40

## 2019-07-31 RX ADMIN — SENNA PLUS 2 TABLET(S): 8.6 TABLET ORAL at 06:18

## 2019-07-31 RX ADMIN — SENNA PLUS 2 TABLET(S): 8.6 TABLET ORAL at 17:39

## 2019-07-31 RX ADMIN — ONDANSETRON 8 MILLIGRAM(S): 8 TABLET, FILM COATED ORAL at 13:22

## 2019-07-31 RX ADMIN — Medication 12.5 MILLIGRAM(S): at 21:40

## 2019-07-31 RX ADMIN — FINASTERIDE 5 MILLIGRAM(S): 5 TABLET, FILM COATED ORAL at 12:28

## 2019-07-31 RX ADMIN — Medication 12.5 MILLIGRAM(S): at 08:42

## 2019-07-31 NOTE — CONSULT NOTE ADULT - PROBLEM SELECTOR RECOMMENDATION 9
plan will be determined by results of surgical pathology. Results are still penidng but if clean proximal margin without osteo then no further abx recommended but if noted residual osteo then will recommend 6 weeks IV ceftriaxone past date of surgery.-recs to follow when surg path available

## 2019-07-31 NOTE — PROGRESS NOTE ADULT - ASSESSMENT
This is a 60yo M with PMHx of HFrEF (recent EF 28%), COLLEEN, DVT/PE, HTN, Factor V Leiden and AML diagnosed in 4/2019 status post induction chemotherapy now in morphologic remission admitted for amputation of right great toe due(done on 7/26) to osteomyelitis to be followed by consolidation chemotherapy with cycle 1 HiDAc. Pt has anemia and thrombocytopenia due to chemo and or disease.

## 2019-07-31 NOTE — CONSULT NOTE ADULT - ASSESSMENT
60yo M with PMHx of HFrEF (recent EF 28%), COLLEEN, DVT/PE, HTN, Factor V Leiden and AML diagnosed in 4/2019 s/p induction chemotherapy now in morphologic remission admitted for amputation of right toe due to osteomyelitis followed by consolidation chemotherapy with cycle 1 HiDAc.    Partial amputation of right great toe 26-Jul-2019 14:54:10     pathology pending

## 2019-07-31 NOTE — PROGRESS NOTE ADULT - ATTENDING COMMENTS
58yo M with AML in CR admitted for right big toe amputation due to osteomyelitis, done 7/26.   Podiatry following -weight bear as tolerated in surgical shoe and keep dressing clean dry and intact   Resumed Xarelto  Started HIDAC consolidation, today is day 2 of therapy.   Patient with mild fatigue but otherwise doing well.   Supportive care. 58yo M with AML in CR admitted for right big toe amputation due to osteomyelitis, done 7/26.   Podiatry following -weight bear as tolerated in surgical shoe and keep dressing clean dry and intact   Resumed Xarelto for Factor V Leiden, will hold once plts <50.   Started HIDAC consolidation, today is day 3 of therapy.   Patient with mild fatigue but otherwise doing well.   Continue IVFs, Supportive care.  Awaiting pathology results from amputation as if margins uninvolved will not need long term IV Ceftriaxone. At that point would only need neutropenic prophylaxis (PO Levaquin).   [facility]

## 2019-07-31 NOTE — CONSULT NOTE ADULT - REASON FOR ADMISSION
Amputation of right great toe and Cycle 1 of HiDAc

## 2019-07-31 NOTE — PROGRESS NOTE ADULT - PROBLEM SELECTOR PLAN 2
Pt is not neutropenic   If febrile, Pan Cx and CXR  Continue Ceftriaxone 2gm IV q day for 6 weeks (through 8/21 for osteomyelitis)  Continue Voriconazole for Aspergillus of face.  ID Dr Gordon Ford consult/fu to decide abx and duration Pt is not neutropenic   If febrile, Pan Cx and CXR  Continue Ceftriaxone 2gm IV q day, pending pathology to decide duration   Continue Voriconazole for Aspergillus of face.  ID consult appreciated

## 2019-07-31 NOTE — PROGRESS NOTE ADULT - PROBLEM SELECTOR PLAN 1
Started Cycle 1 consolidation HiDAc   Monitor CBC/Lytes and transfuse replete PRN  Keep K>4 and Mg>2  Mag sulfate 1g IVPB x 1 for mild hypomagnesemia   Strict Is and Os/Daily weights/Mouth Care  Monitor for cerebellar toxicity  Pred forte eye drops  IVF  Antiemetics.

## 2019-07-31 NOTE — PROGRESS NOTE ADULT - PROBLEM SELECTOR PLAN 5
With previous DVT and PE   Xarelto was on previously on hold for toe amputation, restarted on 7/27  Hold when PLT <50K With previous DVT and PE,  dx many years ago   Xarelto was on previously on hold for toe amputation, restarted on 7/27  Hold when PLT <50K

## 2019-07-31 NOTE — CHART NOTE - NSCHARTNOTEFT_GEN_A_CORE
To Whom It May Concern    Mr. Young Delong is under my care at [facility]. 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     He was admitted on 7/25 , status post partial right hallux amputation on 7/26. He started consolidation chemotherapy  with  HIDAC on 7/29 and he will complete the chemotherapy on 8/3.     Mr. Delong can not travel  on 7/25 and 7/29  due to medical condition. Thank you for your assistance.   Please contact me if you have any question.      Sincerely Yours, To Whom It May Concern    Mr. Young Delong is under my care at [facility]. 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     He was admitted on 7/25 , status post partial right hallux amputation on 7/26. He started consolidation chemotherapy  with  HIDAC on 7/29 and he will complete the chemotherapy on 8/3.     Mr. Delong can not travel  on 7/25 and 7/29  due to medical condition. Thank you for your assistance.   Please contact me if you have any question.      Sincerely Yours,  Bradley Goldberg M.D.

## 2019-07-31 NOTE — ADVANCED PRACTICE NURSE CONSULT - ASSESSMENT
Pt. denies any discomfort at t his time. Reinforved Chemo teachings; Pt. verbalized understanding of chemotherapy infusion, possible side effects of chemotherapy. Drug verification done by 2 RN.'s.Pt. has right upper arm double lumen PICC .Site WNL,with positive blood return noted and flushing easily with 10 ml NS.Dsg dry and intact.Nystagmus check done with negative result. @ 1400(third dose) Day 3/5 Cytarabine 3gm/s4=3653 mg IV  infusing well over 3 hours to PICC via alaris pump.Pt. tolerating well. No c/o, will continue to monitor.

## 2019-07-31 NOTE — CONSULT NOTE ADULT - SUBJECTIVE AND OBJECTIVE BOX
HPI:  This is a 58yo M with PMHx of HFrEF (recent EF 28%), COLLEEN, DVT/PE, HTN, Factor V Leiden and AML diagnosed in 4/2019 s/p induction chemotherapy now in morphologic remission admitted for amputation of right toe due to osteomyelitis followed by consolidation chemotherapy with cycle 1 HiDAc.    Patient was initially being followed by [facility] for factor V Leiden on Xarelto. Labs were normal at check up. Had increased SOB and had repeat labs done and was told to go to ER. Patient went to [facility] hospital due to insurance reasons. He was diagnosed with AML and underwent treated with induction chemotherapy7+3. Day 14 BM bx showed PD and patient was reinduced with 5+2 at which time he developed a wound on the right tow with concern for osteomyelitis as well as right cheek fungalinfection with aspergillosis and neutropenic fevers of 105. After hospitalization, patient was discharged to Subacute rehab for 3weeks. After discharge from rehab due to insurance reasons, the patient [place] [facility] [place]. BM bx showed a CR and plan was made for consolidation chemo with HiDAc.    Patient was admitted for chemotherapy on 7/11/19 and chemotherapy was held for osteomyelitis of right great toe and was seen by ID and Podiatry and the patient was discharged home on 6 weeks of Ceftriaxone (to complete on 8/21/19). After follow up with podiatry the decision was made to readmit for amputation and then will proceed with Cycle 1 of HiDAc due to risk of relapse.    Upon admission the patient has no complaints and denies headache, dizziness, visual changes, chest pain, palpitations, SOB, abdominal pain, nausea, vomiting, diarrhea or dysuria.      The patient will need to proceed to Allogenic transplant and has and appointment with Dr. Gaytan in August. (25 Jul 2019 13:34)    I am asked to see patient post amputation for recs on abx management. At this point the patient reports he is doing well with no concerns.      PAST MEDICAL & SURGICAL HISTORY:  COLLEEN (obstructive sleep apnea)  Pulmonary embolism  Deep vein thrombosis (DVT)  H/O cardiomyopathy  Factor 5 Leiden mutation, heterozygous      Antimicrobials  cefTRIAXone   IVPB 2000 milliGRAM(s) IV Intermittent every 24 hours  voriconazole 200 milliGRAM(s) Oral every 12 hours      Immunological      Other  acetaminophen   Tablet .. 650 milliGRAM(s) Oral every 6 hours PRN  chlorhexidine 2% Cloths 1 Application(s) Topical <User Schedule>  cytarabine IVPB (eMAR) 6150 milliGRAM(s) IV Intermittent every 12 hours  docusate sodium 100 milliGRAM(s) Oral three times a day  finasteride 5 milliGRAM(s) Oral daily  metoclopramide Injectable 10 milliGRAM(s) IV Push every 6 hours PRN  metoprolol succinate ER 12.5 milliGRAM(s) Oral two times a day  nicotine -  14 mG/24Hr(s) Patch 1 patch Transdermal daily  ondansetron Injectable 8 milliGRAM(s) IV Push every 8 hours  oxyCODONE    IR 5 milliGRAM(s) Oral every 4 hours PRN  polyethylene glycol 3350 17 Gram(s) Oral two times a day PRN  prednisoLONE acetate 1% Suspension 2 Drop(s) Both EYES every 6 hours  rivaroxaban 20 milliGRAM(s) Oral with dinner  senna 2 Tablet(s) Oral two times a day  sertraline 50 milliGRAM(s) Oral daily  sodium chloride 0.9%. 1000 milliLiter(s) IV Continuous <Continuous>      Allergies    No Known Allergies    Intolerances      SOCIAL HISTORY: no toxic habits reported    FAMILY HISTORY: noncontrib      ROS:    EYES:  Negative  blurry vision or double vision  GASTROINTESTINAL:  Negative for nausea, vomiting, diarrhea  -otherwise negative except for subjective    Vital Signs Last 24 Hrs  T(C): 36.6 (31 Jul 2019 08:37), Max: 37.2 (30 Jul 2019 21:11)  T(F): 97.8 (31 Jul 2019 08:37), Max: 99 (30 Jul 2019 21:11)  HR: 80 (31 Jul 2019 08:37) (79 - 89)  BP: 102/62 (31 Jul 2019 08:37) (100/56 - 116/73)  BP(mean): --  RR: 18 (31 Jul 2019 08:37) (18 - 18)  SpO2: 98% (31 Jul 2019 08:37) (94% - 98%)    PE:  WDWN in no distress  HEENT:  NC, PERRL, sclerae anicteric, conjunctivae clear, EOMI.  Sinuses nontender, no nasal exudate.  No buccal or pharyngeal lesions, erythema or exudate  Neck:  Supple, no adenopathy  Lungs:  No accessory muscle use, breathing comfortably  Cor:  RRR  Abd:  Symmetric, normoactive BS.  Soft, nontender, no masses, guarding or rebound.  Liver and spleen not enlarged  Extrem:  No cyanosis, foot wrapped  Skin:  No rashes.  Neuro: grossly intact  Musc: moving all limbs freely, no focal deficits    LABS:                        8.7    3.6   )-----------( 69       ( 31 Jul 2019 06:52 )             26.5       WBC Count: 3.6 K/uL (07-31-19 @ 06:52)  WBC Count: 6.4 K/uL (07-30-19 @ 07:09)  WBC Count: 7.0 K/uL (07-29-19 @ 07:19)  WBC Count: 8.0 K/uL (07-28-19 @ 06:44)  WBC Count: 9.9 K/uL (07-27-19 @ 07:00)  WBC Count: 6.9 K/uL (07-26-19 @ 09:36)  WBC Count: 6.3 K/uL (07-26-19 @ 05:51)  WBC Count: 6.0 K/uL (07-25-19 @ 20:28)      07-31    141  |  103  |  19  ----------------------------<  120<H>  4.3   |  23  |  1.33<H>    Ca    9.0      31 Jul 2019 06:51  Phos  4.4     07-31  Mg     1.8     07-31    TPro  5.9<L>  /  Alb  3.7  /  TBili  0.3  /  DBili  x   /  AST  16  /  ALT  16  /  AlkPhos  68  07-31      Creatinine, Serum: 1.33 mg/dL (07-31-19 @ 06:51)  Creatinine, Serum: 1.26 mg/dL (07-30-19 @ 07:09)  Creatinine, Serum: 1.19 mg/dL (07-29-19 @ 07:18)  Creatinine, Serum: 1.19 mg/dL (07-28-19 @ 06:44)  Creatinine, Serum: 1.22 mg/dL (07-27-19 @ 06:58)  Creatinine, Serum: 1.46 mg/dL (07-26-19 @ 05:05)  Creatinine, Serum: 1.31 mg/dL (07-25-19 @ 20:28)      MICROBIOLOGY:    Culture - Tissue with Gram Stain (07.26.19 @ 23:06)    Gram Stain:   Rare polymorphonuclear leukocytes seen per low power field  No organisms seen per oil power field    Specimen Source: .Tissue CLEAR MARGIN BONE, RIGHT    Culture Results:   No growth    Culture - Abscess with Gram Stain (07.11.19 @ 22:49)    Specimen Source: .Abscess    Culture Results:   Few Coag Negative Staphylococcus "Susceptibilities not performed"        RADIOLOGY & ADDITIONAL STUDIES:    --

## 2019-07-31 NOTE — PROGRESS NOTE ADULT - SUBJECTIVE AND OBJECTIVE BOX
[facility] -- Ede Zamudio, Riki, Edgar, Jose Rabago Savella  Office # [phone]      Follow Up:  cardiomyopathy    Subjective/Observations: Patient seen and examined. Events noted. Resting comfortably in bed. No complaints of chest pain, dyspnea, or palpitations reported. No signs of orthopnea or PND.       REVIEW OF SYSTEMS: All other review of systems is negative unless indicated above    PAST MEDICAL & SURGICAL HISTORY:  COLLEEN (obstructive sleep apnea)  Pulmonary embolism  Deep vein thrombosis (DVT)  H/O cardiomyopathy  Factor 5 Leiden mutation, heterozygous      MEDICATIONS  (STANDING):  cefTRIAXone   IVPB 2000 milliGRAM(s) IV Intermittent every 24 hours  chlorhexidine 2% Cloths 1 Application(s) Topical <User Schedule>  cytarabine IVPB (eMAR) 6150 milliGRAM(s) IV Intermittent every 12 hours  docusate sodium 100 milliGRAM(s) Oral three times a day  finasteride 5 milliGRAM(s) Oral daily  metoprolol succinate ER 12.5 milliGRAM(s) Oral two times a day  nicotine -  14 mG/24Hr(s) Patch 1 patch Transdermal daily  ondansetron Injectable 8 milliGRAM(s) IV Push every 8 hours  prednisoLONE acetate 1% Suspension 2 Drop(s) Both EYES every 6 hours  rivaroxaban 20 milliGRAM(s) Oral with dinner  senna 2 Tablet(s) Oral two times a day  sertraline 50 milliGRAM(s) Oral daily  sodium chloride 0.9%. 1000 milliLiter(s) (20 mL/Hr) IV Continuous <Continuous>  voriconazole 200 milliGRAM(s) Oral every 12 hours    MEDICATIONS  (PRN):  acetaminophen   Tablet .. 650 milliGRAM(s) Oral every 6 hours PRN Mild Pain (1 - 3)  metoclopramide Injectable 10 milliGRAM(s) IV Push every 6 hours PRN nausea and or vomiting  oxyCODONE    IR 5 milliGRAM(s) Oral every 4 hours PRN Moderate Pain (4 - 6)  polyethylene glycol 3350 17 Gram(s) Oral two times a day PRN Constipation      Allergies    No Known Allergies    Intolerances            Vital Signs Last 24 Hrs  T(C): 36.3 (31 Jul 2019 17:59), Max: 37.2 (30 Jul 2019 21:11)  T(F): 97.4 (31 Jul 2019 17:59), Max: 99 (30 Jul 2019 21:11)  HR: 75 (31 Jul 2019 17:59) (74 - 87)  BP: 120/68 (31 Jul 2019 17:59) (100/56 - 122/69)  BP(mean): --  RR: 18 (31 Jul 2019 17:59) (18 - 18)  SpO2: 97% (31 Jul 2019 17:59) (94% - 98%)    I&O's Summary    30 Jul 2019 07:01  -  31 Jul 2019 07:00  --------------------------------------------------------  IN: 1180 mL / OUT: 1700 mL / NET: -520 mL    31 Jul 2019 07:01  -  31 Jul 2019 18:30  --------------------------------------------------------  IN: 1160 mL / OUT: 2250 mL / NET: -1090 mL          PHYSICAL EXAM:     Constitutional: NAD, awake and alert, well-developed  HEENT: Moist Mucous Membranes, Anicteric  Pulmonary: Non-labored, breath sounds are clear bilaterally, No wheezing, rales or rhonchi  Cardiovascular: Regular, S1 and S2, No murmurs, rubs, gallops or clicks  Gastrointestinal: Bowel Sounds present, soft, nontender.   Lymph: No peripheral edema. No lymphadenopathy.  Skin: No visible rashes or ulcers.  Psych:  Mood & affect appropriate for situation    LABS: All Labs Reviewed:                        8.7    3.6   )-----------( 69       ( 31 Jul 2019 06:52 )             26.5                         8.8    6.4   )-----------( 82       ( 30 Jul 2019 07:09 )             26.5                         8.4    7.0   )-----------( 87       ( 29 Jul 2019 07:19 )             25.6     31 Jul 2019 06:51    141    |  103    |  19     ----------------------------<  120    4.3     |  23     |  1.33   30 Jul 2019 07:09    137    |  104    |  18     ----------------------------<  99     4.6     |  23     |  1.26   29 Jul 2019 07:18    139    |  104    |  19     ----------------------------<  106    4.1     |  23     |  1.19     Ca    9.0        31 Jul 2019 06:51  Ca    8.6        30 Jul 2019 07:09  Ca    8.9        29 Jul 2019 07:18  Phos  4.4       31 Jul 2019 06:51  Phos  4.4       30 Jul 2019 07:09  Phos  3.9       29 Jul 2019 07:18  Mg     1.8       31 Jul 2019 06:51  Mg     1.8       30 Jul 2019 07:09  Mg     1.8       29 Jul 2019 07:18    TPro  5.9    /  Alb  3.7    /  TBili  0.3    /  DBili  x      /  AST  16     /  ALT  16     /  AlkPhos  68     31 Jul 2019 06:51  TPro  5.7    /  Alb  3.4    /  TBili  0.2    /  DBili  x      /  AST  20     /  ALT  19     /  AlkPhos  66     30 Jul 2019 07:09  TPro  5.8    /  Alb  3.5    /  TBili  <0.1   /  DBili  x      /  AST  19     /  ALT  18     /  AlkPhos  64     29 Jul 2019 07:18

## 2019-07-31 NOTE — CONSULT NOTE ADULT - PROBLEM SELECTOR RECOMMENDATION 2
per oncology.    Thank you for consulting us and involving us in the management of this most interesting and challenging case.     We will follow along in the care of this patient.

## 2019-07-31 NOTE — PROGRESS NOTE ADULT - ASSESSMENT
Mr. Delong is a 59 year old male with AML previously on anthracycline based chemotherapy with a reported EF in the 20's at this time, who was recently admitted for chemotherapy and a toe infection, now returns for amputation of right toe due to osteomyelitis followed by consolidation chemotherapy with cycle 1 HiDAc.    Recently, he had an echocardiogram in our office 7/2019 with mild MR, eccentric LVH with moderate LV dysfunction (EF 35-40%), and mild aortic root dilatation. MUGA with Dilated left ventricle with generalized hypokinesia and EF 28 %, slightly lower than the echocardiogram.  He also has a history of Factor V Leiden and VTE, and has been on Xarelto.    - No sign of acute ischemia, nor does he have any chest pain or difficulty breathing. His last EKG demonstrates a RBBB, LAD, RVH and non-specific st abn.  He reports a cath about 5 years ago at [facility] that was unremarkable and a stress test more recently, though results of this are unavailable.  -  He seems to be tolerating Toprol XL 12.5 bid which can be continued.  - Because of mild NAGA, we held off on ace-inhibitor, especially in the setting of borderline BP. Ideally, we would start him on lisinopril 2.5 mg po daily.  This does not need to be started now  - Xarelto 20    - Watch creatinine and electrolytes. Keep K>4, Mg>2  - Tolerated toe amputation without cardiac complications.  - Cont chemo. Monitor for signs of vol ol.     - Will follow with you.

## 2019-07-31 NOTE — PROGRESS NOTE ADULT - SUBJECTIVE AND OBJECTIVE BOX
Diagnosis: AML    Protocol/Chemo Regimen:  Cycle 1 of HiDAc    Day: 3    Pt endorsed: tiredness     Review of Systems: Denies any chest pain, palpitation, SOB, abdominal pain, nausea, vomiting or diarrhea.    Pain scale: Denies    Diet: Regular    Allergies: No Known Allergies          ANTIMICROBIALS  cefTRIAXone   IVPB 2000 milliGRAM(s) IV Intermittent every 24 hours  voriconazole 200 milliGRAM(s) Oral every 12 hours      HEME/ONC MEDICATIONS  rivaroxaban 20 milliGRAM(s) Oral with dinner      STANDING MEDICATIONS  chlorhexidine 2% Cloths 1 Application(s) Topical <User Schedule>  docusate sodium 100 milliGRAM(s) Oral three times a day  finasteride 5 milliGRAM(s) Oral daily  metoprolol succinate ER 12.5 milliGRAM(s) Oral two times a day  nicotine -  14 mG/24Hr(s) Patch 1 patch Transdermal daily  ondansetron Injectable 8 milliGRAM(s) IV Push every 8 hours  prednisoLONE acetate 1% Suspension 2 Drop(s) Both EYES every 6 hours  senna 2 Tablet(s) Oral two times a day  sertraline 50 milliGRAM(s) Oral daily  sodium chloride 0.9%. 1000 milliLiter(s) IV Continuous <Continuous>      PRN MEDICATIONS  acetaminophen   Tablet .. 650 milliGRAM(s) Oral every 6 hours PRN  metoclopramide Injectable 10 milliGRAM(s) IV Push every 6 hours PRN  oxyCODONE    IR 5 milliGRAM(s) Oral every 4 hours PRN  polyethylene glycol 3350 17 Gram(s) Oral two times a day PRN        Vital Signs Last 24 Hrs  T(C): 36.2 (31 Jul 2019 06:00), Max: 37.2 (30 Jul 2019 21:11)  T(F): 97.2 (31 Jul 2019 06:00), Max: 99 (30 Jul 2019 21:11)  HR: 79 (31 Jul 2019 06:00) (79 - 89)  BP: 113/67 (31 Jul 2019 06:00) (100/56 - 116/73)  BP(mean): --  RR: 18 (31 Jul 2019 06:00) (18 - 18)  SpO2: 96% (31 Jul 2019 06:00) (94% - 98%)      PHYSICAL EXAM  General: NAD  HEENT: PERRLA, EOMI, clear oropharynx  Neck: supple  CV: (+) S1/S2 RRR  Lungs: clear to auscultation, no wheezes or rales  Abdomen: soft, non-tender, non-distended (+) BS  Ext: no edema. R great toe with DSD C/D/I  Skin: no rash  Neuro: alert and oriented X 3, no focal deficits  Central Line: C/D/I      LABS:                        8.8    6.4   )-----------( 82       ( 30 Jul 2019 07:09 )             26.5         Mean Cell Volume : 99.2 fl  Mean Cell Hemoglobin : 33.1 pg  Mean Cell Hemoglobin Concentration : 33.4 gm/dL  Auto Neutrophil # : 4.2 K/uL  Auto Lymphocyte # : 1.6 K/uL  Auto Monocyte # : 0.5 K/uL  Auto Eosinophil # : 0.1 K/uL  Auto Basophil # : 0.0 K/uL  Auto Neutrophil % : 65.7 %  Auto Lymphocyte % : 24.5 %  Auto Monocyte % : 8.2 %  Auto Eosinophil % : 1.2 %  Auto Basophil % : 0.4 %      07-30    137  |  104  |  18  ----------------------------<  99  4.6   |  23  |  1.26    Ca    8.6      30 Jul 2019 07:09  Phos  4.4     07-30  Mg     1.8     07-30    TPro  5.7<L>  /  Alb  3.4  /  TBili  0.2  /  DBili  x   /  AST  20  /  ALT  19  /  AlkPhos  66  07-30                  RECENT CULTURES:  07-26 @ 23:06  .Tissue CLEAR MARGIN BONE, RIGHT  --  --  --    No growth  --      RADIOLOGY & ADDITIONAL STUDIES:    EXAM:  FOOT COMPLETE RIGHT (MIN 3 VIEW)                        PROCEDURE DATE:  07/26/2019    Findings: Patient is status post amputation of the first toe to the level of the   mid shaft of the first proximal phalanx. Surgical margins are sharp.   There is surrounding soft tissue edema. The joint spaces are preserved.   There is atherosclerotic disease. Diagnosis: AML    Protocol/Chemo Regimen:  Cycle 1 of HiDAc    Day: 3    Pt endorsed: no complaint    Review of Systems: Denies any chest pain, palpitation, SOB, abdominal pain, nausea, vomiting or diarrhea.    Pain scale: Denies    Diet: Regular    Allergies: No Known Allergies    ANTIMICROBIALS  cefTRIAXone   IVPB 2000 milliGRAM(s) IV Intermittent every 24 hours  voriconazole 200 milliGRAM(s) Oral every 12 hours      HEME/ONC MEDICATIONS  rivaroxaban 20 milliGRAM(s) Oral with dinner      STANDING MEDICATIONS  chlorhexidine 2% Cloths 1 Application(s) Topical <User Schedule>  docusate sodium 100 milliGRAM(s) Oral three times a day  finasteride 5 milliGRAM(s) Oral daily  metoprolol succinate ER 12.5 milliGRAM(s) Oral two times a day  nicotine -  14 mG/24Hr(s) Patch 1 patch Transdermal daily  ondansetron Injectable 8 milliGRAM(s) IV Push every 8 hours  prednisoLONE acetate 1% Suspension 2 Drop(s) Both EYES every 6 hours  senna 2 Tablet(s) Oral two times a day  sertraline 50 milliGRAM(s) Oral daily  sodium chloride 0.9%. 1000 milliLiter(s) IV Continuous <Continuous>      PRN MEDICATIONS  acetaminophen   Tablet .. 650 milliGRAM(s) Oral every 6 hours PRN  metoclopramide Injectable 10 milliGRAM(s) IV Push every 6 hours PRN  oxyCODONE    IR 5 milliGRAM(s) Oral every 4 hours PRN  polyethylene glycol 3350 17 Gram(s) Oral two times a day PRN      Vital Signs Last 24 Hrs  T(C): 36.2 (31 Jul 2019 06:00), Max: 37.2 (30 Jul 2019 21:11)  T(F): 97.2 (31 Jul 2019 06:00), Max: 99 (30 Jul 2019 21:11)  HR: 79 (31 Jul 2019 06:00) (79 - 89)  BP: 113/67 (31 Jul 2019 06:00) (100/56 - 116/73)  BP(mean): --  RR: 18 (31 Jul 2019 06:00) (18 - 18)  SpO2: 96% (31 Jul 2019 06:00) (94% - 98%)      PHYSICAL EXAM  General: NAD  HEENT: PERRLA, EOMI, clear oropharynx  Neck: supple  CV: (+) S1/S2 RRR  Lungs: clear to auscultation, no wheezes or rales  Abdomen: soft, non-tender, non-distended (+) BS  Ext: no edema. R great toe with DSD C/D/I  Skin: no rash  Neuro: alert and oriented X 3, no focal deficits  Central Line: C/D/I      LABS:    Blood Cultures:                           8.7    3.6   )-----------( 69       ( 31 Jul 2019 06:52 )             26.5         Mean Cell Volume : 99.6 fl  Mean Cell Hemoglobin : 32.8 pg  Mean Cell Hemoglobin Concentration : 32.9 gm/dL  Auto Neutrophil # : 2.6 K/uL  Auto Lymphocyte # : 0.8 K/uL  Auto Monocyte # : 0.2 K/uL  Auto Eosinophil # : 0.0 K/uL  Auto Basophil # : 0.0 K/uL  Auto Neutrophil % : 72.4 %  Auto Lymphocyte % : 21.4 %  Auto Monocyte % : 4.7 %  Auto Eosinophil % : 1.3 %  Auto Basophil % : 0.2 %      07-31    141  |  103  |  19  ----------------------------<  120<H>  4.3   |  23  |  1.33<H>    Ca    9.0      31 Jul 2019 06:51  Phos  4.4     07-31  Mg     1.8     07-31    TPro  5.9<L>  /  Alb  3.7  /  TBili  0.3  /  DBili  x   /  AST  16  /  ALT  16  /  AlkPhos  68  07-31      Mg 1.8  Phos 4.4        RECENT CULTURES:  07-26 @ 23:06  .Tissue CLEAR MARGIN BONE, RIGHT  --  --  --    No growth  --      RADIOLOGY & ADDITIONAL STUDIES:    EXAM:  FOOT COMPLETE RIGHT (MIN 3 VIEW)                        PROCEDURE DATE:  07/26/2019    Findings: Patient is status post amputation of the first toe to the level of the   mid shaft of the first proximal phalanx. Surgical margins are sharp.   There is surrounding soft tissue edema. The joint spaces are preserved.   There is atherosclerotic disease.

## 2019-08-01 LAB
ALBUMIN SERPL ELPH-MCNC: 3.5 G/DL — SIGNIFICANT CHANGE UP (ref 3.3–5)
ALP SERPL-CCNC: 59 U/L — SIGNIFICANT CHANGE UP (ref 40–120)
ALT FLD-CCNC: 15 U/L — SIGNIFICANT CHANGE UP (ref 10–45)
ANION GAP SERPL CALC-SCNC: 10 MMOL/L — SIGNIFICANT CHANGE UP (ref 5–17)
AST SERPL-CCNC: 17 U/L — SIGNIFICANT CHANGE UP (ref 10–40)
BASOPHILS # BLD AUTO: 0 K/UL — SIGNIFICANT CHANGE UP (ref 0–0.2)
BASOPHILS NFR BLD AUTO: 0.5 % — SIGNIFICANT CHANGE UP (ref 0–2)
BILIRUB SERPL-MCNC: 0.2 MG/DL — SIGNIFICANT CHANGE UP (ref 0.2–1.2)
BUN SERPL-MCNC: 22 MG/DL — SIGNIFICANT CHANGE UP (ref 7–23)
CALCIUM SERPL-MCNC: 9.3 MG/DL — SIGNIFICANT CHANGE UP (ref 8.4–10.5)
CHLORIDE SERPL-SCNC: 101 MMOL/L — SIGNIFICANT CHANGE UP (ref 96–108)
CO2 SERPL-SCNC: 24 MMOL/L — SIGNIFICANT CHANGE UP (ref 22–31)
CREAT SERPL-MCNC: 1.18 MG/DL — SIGNIFICANT CHANGE UP (ref 0.5–1.3)
EOSINOPHIL # BLD AUTO: 0 K/UL — SIGNIFICANT CHANGE UP (ref 0–0.5)
EOSINOPHIL NFR BLD AUTO: 1.7 % — SIGNIFICANT CHANGE UP (ref 0–6)
GLUCOSE SERPL-MCNC: 134 MG/DL — HIGH (ref 70–99)
HCT VFR BLD CALC: 25.2 % — LOW (ref 39–50)
HGB BLD-MCNC: 8.2 G/DL — LOW (ref 13–17)
LYMPHOCYTES # BLD AUTO: 0.6 K/UL — LOW (ref 1–3.3)
LYMPHOCYTES # BLD AUTO: 22.1 % — SIGNIFICANT CHANGE UP (ref 13–44)
MAGNESIUM SERPL-MCNC: 2 MG/DL — SIGNIFICANT CHANGE UP (ref 1.6–2.6)
MCHC RBC-ENTMCNC: 32.5 PG — SIGNIFICANT CHANGE UP (ref 27–34)
MCHC RBC-ENTMCNC: 32.7 GM/DL — SIGNIFICANT CHANGE UP (ref 32–36)
MCV RBC AUTO: 99.4 FL — SIGNIFICANT CHANGE UP (ref 80–100)
MONOCYTES # BLD AUTO: 0 K/UL — SIGNIFICANT CHANGE UP (ref 0–0.9)
MONOCYTES NFR BLD AUTO: 1.7 % — LOW (ref 2–14)
NEUTROPHILS # BLD AUTO: 1.9 K/UL — SIGNIFICANT CHANGE UP (ref 1.8–7.4)
NEUTROPHILS NFR BLD AUTO: 74.1 % — SIGNIFICANT CHANGE UP (ref 43–77)
PHOSPHATE SERPL-MCNC: 4.2 MG/DL — SIGNIFICANT CHANGE UP (ref 2.5–4.5)
PLATELET # BLD AUTO: 56 K/UL — LOW (ref 150–400)
POTASSIUM SERPL-MCNC: 4.1 MMOL/L — SIGNIFICANT CHANGE UP (ref 3.5–5.3)
POTASSIUM SERPL-SCNC: 4.1 MMOL/L — SIGNIFICANT CHANGE UP (ref 3.5–5.3)
PROT SERPL-MCNC: 5.9 G/DL — LOW (ref 6–8.3)
RBC # BLD: 2.53 M/UL — LOW (ref 4.2–5.8)
RBC # FLD: 15.9 % — HIGH (ref 10.3–14.5)
SODIUM SERPL-SCNC: 135 MMOL/L — SIGNIFICANT CHANGE UP (ref 135–145)
WBC # BLD: 2.6 K/UL — LOW (ref 3.8–10.5)
WBC # FLD AUTO: 2.6 K/UL — LOW (ref 3.8–10.5)

## 2019-08-01 PROCEDURE — 99232 SBSQ HOSP IP/OBS MODERATE 35: CPT

## 2019-08-01 PROCEDURE — 99232 SBSQ HOSP IP/OBS MODERATE 35: CPT | Mod: GC

## 2019-08-01 RX ORDER — LANOLIN ALCOHOL/MO/W.PET/CERES
3 CREAM (GRAM) TOPICAL AT BEDTIME
Refills: 0 | Status: COMPLETED | OUTPATIENT
Start: 2019-08-01 | End: 2019-08-01

## 2019-08-01 RX ADMIN — Medication 650 MILLIGRAM(S): at 11:54

## 2019-08-01 RX ADMIN — SERTRALINE 50 MILLIGRAM(S): 25 TABLET, FILM COATED ORAL at 11:54

## 2019-08-01 RX ADMIN — FINASTERIDE 5 MILLIGRAM(S): 5 TABLET, FILM COATED ORAL at 11:53

## 2019-08-01 RX ADMIN — Medication 2 DROP(S): at 05:40

## 2019-08-01 RX ADMIN — Medication 2 DROP(S): at 00:54

## 2019-08-01 RX ADMIN — Medication 650 MILLIGRAM(S): at 18:29

## 2019-08-01 RX ADMIN — SODIUM CHLORIDE 20 MILLILITER(S): 9 INJECTION INTRAMUSCULAR; INTRAVENOUS; SUBCUTANEOUS at 17:23

## 2019-08-01 RX ADMIN — Medication 650 MILLIGRAM(S): at 12:24

## 2019-08-01 RX ADMIN — Medication 100 MILLIGRAM(S): at 21:55

## 2019-08-01 RX ADMIN — ONDANSETRON 8 MILLIGRAM(S): 8 TABLET, FILM COATED ORAL at 14:28

## 2019-08-01 RX ADMIN — Medication 100 MILLIGRAM(S): at 14:28

## 2019-08-01 RX ADMIN — ONDANSETRON 8 MILLIGRAM(S): 8 TABLET, FILM COATED ORAL at 21:55

## 2019-08-01 RX ADMIN — Medication 187.17 MILLIGRAM(S): at 02:12

## 2019-08-01 RX ADMIN — Medication 12.5 MILLIGRAM(S): at 21:54

## 2019-08-01 RX ADMIN — CEFTRIAXONE 100 MILLIGRAM(S): 500 INJECTION, POWDER, FOR SOLUTION INTRAMUSCULAR; INTRAVENOUS at 21:55

## 2019-08-01 RX ADMIN — RIVAROXABAN 20 MILLIGRAM(S): KIT at 17:22

## 2019-08-01 RX ADMIN — Medication 650 MILLIGRAM(S): at 04:03

## 2019-08-01 RX ADMIN — Medication 650 MILLIGRAM(S): at 18:59

## 2019-08-01 RX ADMIN — Medication 100 MILLIGRAM(S): at 05:40

## 2019-08-01 RX ADMIN — CHLORHEXIDINE GLUCONATE 1 APPLICATION(S): 213 SOLUTION TOPICAL at 05:40

## 2019-08-01 RX ADMIN — ONDANSETRON 8 MILLIGRAM(S): 8 TABLET, FILM COATED ORAL at 05:40

## 2019-08-01 RX ADMIN — VORICONAZOLE 200 MILLIGRAM(S): 10 INJECTION, POWDER, LYOPHILIZED, FOR SOLUTION INTRAVENOUS at 05:40

## 2019-08-01 RX ADMIN — Medication 12.5 MILLIGRAM(S): at 09:24

## 2019-08-01 RX ADMIN — Medication 2 DROP(S): at 11:53

## 2019-08-01 RX ADMIN — Medication 3 MILLIGRAM(S): at 22:07

## 2019-08-01 RX ADMIN — Medication 2 DROP(S): at 17:22

## 2019-08-01 RX ADMIN — SENNA PLUS 2 TABLET(S): 8.6 TABLET ORAL at 17:22

## 2019-08-01 RX ADMIN — VORICONAZOLE 200 MILLIGRAM(S): 10 INJECTION, POWDER, LYOPHILIZED, FOR SOLUTION INTRAVENOUS at 17:22

## 2019-08-01 RX ADMIN — SENNA PLUS 2 TABLET(S): 8.6 TABLET ORAL at 05:40

## 2019-08-01 NOTE — PROGRESS NOTE ADULT - PROBLEM SELECTOR PLAN 5
With previous DVT and PE,  dx many years ago   Xarelto was on previously on hold for toe amputation, restarted on 7/27  Hold when PLT <50K

## 2019-08-01 NOTE — PROGRESS NOTE ADULT - ASSESSMENT
58yo M with PMHx of HFrEF (recent EF 28%), COLLEEN, DVT/PE, HTN, Factor V Leiden and AML diagnosed in 4/2019 s/p induction chemotherapy now in morphologic remission admitted for amputation of right toe due to osteomyelitis followed by consolidation chemotherapy with cycle 1 HiDAc.    Partial amputation of right great toe 26-Jul-2019 14:54:10     pathology pending

## 2019-08-01 NOTE — PROGRESS NOTE ADULT - SUBJECTIVE AND OBJECTIVE BOX
[facility] -- Ede Zamudio, Riki, Edgar, Jose Rabago Savella  Office # [phone]      Follow Up:  Cardiomyopathy    Subjective/Observations:   Patient seen and examined. Events noted. Resting comfortably in bed. No complaints of chest pain, dyspnea, or palpitations reported. No signs of orthopnea or PND.     REVIEW OF SYSTEMS: All other review of systems is negative unless indicated above    PAST MEDICAL & SURGICAL HISTORY:  COLLEEN (obstructive sleep apnea)  Pulmonary embolism  Deep vein thrombosis (DVT)  H/O cardiomyopathy  Factor 5 Leiden mutation, heterozygous      MEDICATIONS  (STANDING):  cefTRIAXone   IVPB 2000 milliGRAM(s) IV Intermittent every 24 hours  chlorhexidine 2% Cloths 1 Application(s) Topical <User Schedule>  docusate sodium 100 milliGRAM(s) Oral three times a day  finasteride 5 milliGRAM(s) Oral daily  metoprolol succinate ER 12.5 milliGRAM(s) Oral two times a day  nicotine -  14 mG/24Hr(s) Patch 1 patch Transdermal daily  ondansetron Injectable 8 milliGRAM(s) IV Push every 8 hours  prednisoLONE acetate 1% Suspension 2 Drop(s) Both EYES every 6 hours  rivaroxaban 20 milliGRAM(s) Oral with dinner  senna 2 Tablet(s) Oral two times a day  sertraline 50 milliGRAM(s) Oral daily  sodium chloride 0.9%. 1000 milliLiter(s) (20 mL/Hr) IV Continuous <Continuous>  voriconazole 200 milliGRAM(s) Oral every 12 hours    MEDICATIONS  (PRN):  acetaminophen   Tablet .. 650 milliGRAM(s) Oral every 6 hours PRN Mild Pain (1 - 3)  aluminum hydroxide/magnesium hydroxide/simethicone Suspension 30 milliLiter(s) Oral every 6 hours PRN Dyspepsia  metoclopramide Injectable 10 milliGRAM(s) IV Push every 6 hours PRN nausea and or vomiting  oxyCODONE    IR 5 milliGRAM(s) Oral every 4 hours PRN Moderate Pain (4 - 6)  polyethylene glycol 3350 17 Gram(s) Oral two times a day PRN Constipation      Allergies    No Known Allergies    Intolerances            Vital Signs Last 24 Hrs  T(C): 36 (01 Aug 2019 09:17), Max: 36.7 (31 Jul 2019 13:04)  T(F): 96.8 (01 Aug 2019 09:17), Max: 98 (31 Jul 2019 13:04)  HR: 63 (01 Aug 2019 09:17) (63 - 84)  BP: 114/62 (01 Aug 2019 09:17) (99/55 - 120/68)  BP(mean): --  RR: 18 (01 Aug 2019 09:17) (18 - 18)  SpO2: 98% (01 Aug 2019 09:17) (94% - 98%)    I&O's Summary    31 Jul 2019 07:01  -  01 Aug 2019 07:00  --------------------------------------------------------  IN: 3065 mL / OUT: 2700 mL / NET: 365 mL    01 Aug 2019 07:01  -  01 Aug 2019 10:18  --------------------------------------------------------  IN: 250 mL / OUT: 900 mL / NET: -650 mL          PHYSICAL EXAM:     Constitutional: NAD, awake and alert, well-developed  HEENT: Moist Mucous Membranes, Anicteric  Pulmonary: Decreased breath sounds b/l. No rales, crackles or wheeze appreciated.   Cardiovascular: Regular, S1 and S2, No murmurs, rubs, gallops or clicks  Gastrointestinal: Bowel Sounds present, soft, nontender.   Lymph: No peripheral edema. No lymphadenopathy.  Skin: No visible rashes or ulcers.  Psych:  Mood & affect appropriate for situation    LABS: All Labs Reviewed:                        8.2    2.6   )-----------( 56       ( 01 Aug 2019 06:49 )             25.2                         8.7    3.6   )-----------( 69       ( 31 Jul 2019 06:52 )             26.5                         8.8    6.4   )-----------( 82       ( 30 Jul 2019 07:09 )             26.5     01 Aug 2019 06:49    135    |  101    |  22     ----------------------------<  134    4.1     |  24     |  1.18   31 Jul 2019 06:51    141    |  103    |  19     ----------------------------<  120    4.3     |  23     |  1.33   30 Jul 2019 07:09    137    |  104    |  18     ----------------------------<  99     4.6     |  23     |  1.26     Ca    9.3        01 Aug 2019 06:49  Ca    9.0        31 Jul 2019 06:51  Ca    8.6        30 Jul 2019 07:09  Phos  4.2       01 Aug 2019 06:49  Phos  4.4       31 Jul 2019 06:51  Phos  4.4       30 Jul 2019 07:09  Mg     2.0       01 Aug 2019 06:49  Mg     1.8       31 Jul 2019 06:51  Mg     1.8       30 Jul 2019 07:09    TPro  5.9    /  Alb  3.5    /  TBili  0.2    /  DBili  x      /  AST  17     /  ALT  15     /  AlkPhos  59     01 Aug 2019 06:49  TPro  5.9    /  Alb  3.7    /  TBili  0.3    /  DBili  x      /  AST  16     /  ALT  16     /  AlkPhos  68     31 Jul 2019 06:51  TPro  5.7    /  Alb  3.4    /  TBili  0.2    /  DBili  x      /  AST  20     /  ALT  19     /  AlkPhos  66     30 Jul 2019 07:09

## 2019-08-01 NOTE — PROGRESS NOTE ADULT - ATTENDING COMMENTS
60yo M with AML in CR admitted for right big toe amputation due to osteomyelitis, done 7/26.   Podiatry following -weight bear as tolerated in surgical shoe and keep dressing clean dry and intact   Resumed Xarelto for Factor V Leiden, will hold once plts <50.   Started HIDAC consolidation, today is day 3 of therapy.   Patient with mild fatigue but otherwise doing well.   Continue IVFs, Supportive care.  Awaiting pathology results from amputation as if margins uninvolved will not need long term IV Ceftriaxone. At that point would only need neutropenic prophylaxis (PO Levaquin).   [facility].

## 2019-08-01 NOTE — PROGRESS NOTE ADULT - SUBJECTIVE AND OBJECTIVE BOX
Diagnosis: AML    Protocol/Chemo Regimen:  Cycle 1 of HiDAc    Day: 4    Pt endorsed: no complaint    Review of Systems: Denies any chest pain, palpitation, SOB, abdominal pain, nausea, vomiting or diarrhea.    Pain scale: Denies    Diet: Regular    Allergies: No Known Allergies          ANTIMICROBIALS  cefTRIAXone   IVPB 2000 milliGRAM(s) IV Intermittent every 24 hours  voriconazole 200 milliGRAM(s) Oral every 12 hours      HEME/ONC MEDICATIONS  rivaroxaban 20 milliGRAM(s) Oral with dinner      STANDING MEDICATIONS  chlorhexidine 2% Cloths 1 Application(s) Topical <User Schedule>  docusate sodium 100 milliGRAM(s) Oral three times a day  finasteride 5 milliGRAM(s) Oral daily  metoprolol succinate ER 12.5 milliGRAM(s) Oral two times a day  nicotine -  14 mG/24Hr(s) Patch 1 patch Transdermal daily  ondansetron Injectable 8 milliGRAM(s) IV Push every 8 hours  prednisoLONE acetate 1% Suspension 2 Drop(s) Both EYES every 6 hours  senna 2 Tablet(s) Oral two times a day  sertraline 50 milliGRAM(s) Oral daily  sodium chloride 0.9%. 1000 milliLiter(s) IV Continuous <Continuous>      PRN MEDICATIONS  acetaminophen   Tablet .. 650 milliGRAM(s) Oral every 6 hours PRN  aluminum hydroxide/magnesium hydroxide/simethicone Suspension 30 milliLiter(s) Oral every 6 hours PRN  metoclopramide Injectable 10 milliGRAM(s) IV Push every 6 hours PRN  oxyCODONE    IR 5 milliGRAM(s) Oral every 4 hours PRN  polyethylene glycol 3350 17 Gram(s) Oral two times a day PRN        Vital Signs Last 24 Hrs  T(C): 36 (01 Aug 2019 05:45), Max: 36.7 (31 Jul 2019 13:04)  T(F): 96.8 (01 Aug 2019 05:45), Max: 98 (31 Jul 2019 13:04)  HR: 75 (01 Aug 2019 05:45) (72 - 84)  BP: 99/55 (01 Aug 2019 05:45) (99/55 - 122/69)  BP(mean): --  RR: 18 (01 Aug 2019 05:45) (18 - 18)  SpO2: 97% (01 Aug 2019 05:45) (94% - 98%)      PHYSICAL EXAM  General: NAD  HEENT: PERRLA, EOMI, clear oropharynx  Neck: supple  CV: (+) S1/S2 RRR  Lungs: clear to auscultation, no wheezes or rales  Abdomen: soft, non-tender, non-distended (+) BS  Ext: no edema. R great toe with DSD C/D/I  Skin: no rash  Neuro: alert and oriented X 3, no focal deficits  Central Line: C/D/I      LABS:                        8.2    2.6   )-----------( 56       ( 01 Aug 2019 06:49 )             25.2         Mean Cell Volume : 99.4 fl  Mean Cell Hemoglobin : 32.5 pg  Mean Cell Hemoglobin Concentration : 32.7 gm/dL  Auto Neutrophil # : x  Auto Lymphocyte # : x  Auto Monocyte # : x  Auto Eosinophil # : x  Auto Basophil # : x  Auto Neutrophil % : x  Auto Lymphocyte % : x  Auto Monocyte % : x  Auto Eosinophil % : x  Auto Basophil % : x      07-31    141  |  103  |  19  ----------------------------<  120<H>  4.3   |  23  |  1.33<H>    Ca    9.0      31 Jul 2019 06:51  Phos  4.4     07-31  Mg     1.8     07-31    TPro  5.9<L>  /  Alb  3.7  /  TBili  0.3  /  DBili  x   /  AST  16  /  ALT  16  /  AlkPhos  68  07-31                  RECENT CULTURES:  07-26 @ 23:06  .Tissue CLEAR MARGIN BONE, RIGHT  --  --  --    No growth at 5 days  --      RADIOLOGY & ADDITIONAL STUDIES:    EXAM:  FOOT COMPLETE RIGHT (MIN 3 VIEW)                        PROCEDURE DATE:  07/26/2019    Findings: Patient is status post amputation of the first toe to the level of the   mid shaft of the first proximal phalanx. Surgical margins are sharp.   There is surrounding soft tissue edema. The joint spaces are preserved.   There is atherosclerotic disease. Diagnosis: AML    Protocol/Chemo Regimen:  Cycle 1 of HiDAc    Day: 4    Pt endorsed: abd pain x 2 days, not relief with maalax; right jeremy pain since yesterday; mild discomfort when he urinates    Review of Systems: Denies any chest pain, palpitation, SOB, abdominal pain, nausea, vomiting or diarrhea.    Pain scale: Denies    Diet: Regular    Allergies: No Known Allergies    ANTIMICROBIALS  cefTRIAXone   IVPB 2000 milliGRAM(s) IV Intermittent every 24 hours  voriconazole 200 milliGRAM(s) Oral every 12 hours      HEME/ONC MEDICATIONS  rivaroxaban 20 milliGRAM(s) Oral with dinner      STANDING MEDICATIONS  chlorhexidine 2% Cloths 1 Application(s) Topical <User Schedule>  docusate sodium 100 milliGRAM(s) Oral three times a day  finasteride 5 milliGRAM(s) Oral daily  metoprolol succinate ER 12.5 milliGRAM(s) Oral two times a day  nicotine -  14 mG/24Hr(s) Patch 1 patch Transdermal daily  ondansetron Injectable 8 milliGRAM(s) IV Push every 8 hours  prednisoLONE acetate 1% Suspension 2 Drop(s) Both EYES every 6 hours  senna 2 Tablet(s) Oral two times a day  sertraline 50 milliGRAM(s) Oral daily  sodium chloride 0.9%. 1000 milliLiter(s) IV Continuous <Continuous>      PRN MEDICATIONS  acetaminophen   Tablet .. 650 milliGRAM(s) Oral every 6 hours PRN  aluminum hydroxide/magnesium hydroxide/simethicone Suspension 30 milliLiter(s) Oral every 6 hours PRN  metoclopramide Injectable 10 milliGRAM(s) IV Push every 6 hours PRN  oxyCODONE    IR 5 milliGRAM(s) Oral every 4 hours PRN  polyethylene glycol 3350 17 Gram(s) Oral two times a day PRN      Vital Signs Last 24 Hrs  T(C): 36 (01 Aug 2019 05:45), Max: 36.7 (31 Jul 2019 13:04)  T(F): 96.8 (01 Aug 2019 05:45), Max: 98 (31 Jul 2019 13:04)  HR: 75 (01 Aug 2019 05:45) (72 - 84)  BP: 99/55 (01 Aug 2019 05:45) (99/55 - 122/69)  BP(mean): --  RR: 18 (01 Aug 2019 05:45) (18 - 18)  SpO2: 97% (01 Aug 2019 05:45) (94% - 98%)      PHYSICAL EXAM  General: NAD  HEENT: PERRLA, EOMI, clear oropharynx  Neck: supple  CV: (+) S1/S2 RRR  Lungs: clear to auscultation, no wheezes or rales  Abdomen: soft, non-tender, non-distended (+) BS, no CVA tenderness   Ext: no edema. R great toe with DSD C/D/I  Skin: no rash  Neuro: alert and oriented X 3, no focal deficits  Central Line: C/D/I      LABS:             8.2    2.6   )-----------( 56       ( 01 Aug 2019 06:49 )             25.2         Mean Cell Volume : 99.4 fl  Mean Cell Hemoglobin : 32.5 pg  Mean Cell Hemoglobin Concentration : 32.7 gm/dL  Auto Neutrophil # : 1.9 K/uL  Auto Lymphocyte # : 0.6 K/uL  Auto Monocyte # : 0.0 K/uL  Auto Eosinophil # : 0.0 K/uL  Auto Basophil # : 0.0 K/uL  Auto Neutrophil % : 74.1 %  Auto Lymphocyte % : 22.1 %  Auto Monocyte % : 1.7 %  Auto Eosinophil % : 1.7 %  Auto Basophil % : 0.5 %      08-01    135  |  101  |  22  ----------------------------<  134<H>  4.1   |  24  |  1.18    Ca    9.3      01 Aug 2019 06:49  Phos  4.2     08-01  Mg     2.0     08-01    TPro  5.9<L>  /  Alb  3.5  /  TBili  0.2  /  DBili  x   /  AST  17  /  ALT  15  /  AlkPhos  59  08-01        RECENT CULTURES:  07-26 @ 23:06  .Tissue CLEAR MARGIN BONE, RIGHT  --  --  --    No growth at 5 days  --      RADIOLOGY & ADDITIONAL STUDIES:    EXAM:  FOOT COMPLETE RIGHT (MIN 3 VIEW)                        PROCEDURE DATE:  07/26/2019    Findings: Patient is status post amputation of the first toe to the level of the   mid shaft of the first proximal phalanx. Surgical margins are sharp.   There is surrounding soft tissue edema. The joint spaces are preserved.   There is atherosclerotic disease.

## 2019-08-01 NOTE — PROGRESS NOTE ADULT - ASSESSMENT
Mr. Delong is a 59 year old male with AML previously on anthracycline based chemotherapy with a reported EF in the 20's at this time, who was recently admitted for chemotherapy and a toe infection, now returns for amputation of right toe due to osteomyelitis followed by consolidation chemotherapy with cycle 1 HiDAc.    Recently, he had an echocardiogram in our office 7/2019 with mild MR, eccentric LVH with moderate LV dysfunction (EF 35-40%), and mild aortic root dilatation. MUGA with Dilated left ventricle with generalized hypokinesia and EF 28 %, slightly lower than the echocardiogram.  He also has a history of Factor V Leiden and VTE, and has been on Xarelto.    - No sign of acute ischemia, nor does he have any chest pain or difficulty breathing. His last EKG demonstrates a RBBB, LAD, RVH and non-specific st abn.  He reports a cath about 5 years ago at [facility] that was unremarkable and a stress test more recently, though results of this are unavailable.  -  He seems to be tolerating Toprol XL 12.5 bid which can be continued.  - Because of mild NAGA, we held off on ace-inhibitor, especially in the setting of borderline BP. Ideally, we would start him on lisinopril 2.5 mg po daily.  This does not need to be started now given his soft BP  - Xarelto 20    - Watch creatinine and electrolytes. Keep K>4, Mg>2  - Tolerated toe amputation without cardiac complications.  - Cont chemo. Monitor for signs of vol ol.     - Will follow with you.

## 2019-08-01 NOTE — ADVANCED PRACTICE NURSE CONSULT - ASSESSMENT
Patient denies any discomfort at this time. Reinforced Chemo Teachings: Patient verbalized understanding of Chemotherapy infusion and possible side effects of chemo. Drug verification done by two RN's. Patient has right upper arm double lumen PICC. WDL, with positive blood return noted and flushed easily with 10 mL of normal saline. Nystagmus check done with negative result. At 0200 (fourth dose)  Day 4/5 Cytarabine 3gm/m2 = 6150 mg IV infusing well over three hours to PICC via alaris pump. Patient tolerating well. No c/o at this time. Will continue to monitor.

## 2019-08-01 NOTE — PROGRESS NOTE ADULT - PROBLEM SELECTOR PLAN 2
Pt is not neutropenic   If febrile, Pan Cx and CXR  Continue Ceftriaxone 2gm IV q day, pending pathology to decide duration   Continue Voriconazole for Aspergillus of face.  ID consult appreciated Pt is not neutropenic   If febrile, Pan Cx and CXR  Continue Ceftriaxone 2gm IV q day, pending pathology to decide duration, called path dept on 8/1 to rush the report    Continue Voriconazole for Aspergillus of face.  ID consult appreciated

## 2019-08-02 ENCOUNTER — TRANSCRIPTION ENCOUNTER (OUTPATIENT)
Age: 59
End: 2019-08-02

## 2019-08-02 LAB
ALBUMIN SERPL ELPH-MCNC: 3.7 G/DL — SIGNIFICANT CHANGE UP (ref 3.3–5)
ALP SERPL-CCNC: 62 U/L — SIGNIFICANT CHANGE UP (ref 40–120)
ALT FLD-CCNC: 19 U/L — SIGNIFICANT CHANGE UP (ref 10–45)
ANION GAP SERPL CALC-SCNC: 12 MMOL/L — SIGNIFICANT CHANGE UP (ref 5–17)
APPEARANCE UR: CLEAR — SIGNIFICANT CHANGE UP
AST SERPL-CCNC: 23 U/L — SIGNIFICANT CHANGE UP (ref 10–40)
BACTERIA # UR AUTO: NEGATIVE — SIGNIFICANT CHANGE UP
BASOPHILS # BLD AUTO: 0 K/UL — SIGNIFICANT CHANGE UP (ref 0–0.2)
BASOPHILS NFR BLD AUTO: 0.3 % — SIGNIFICANT CHANGE UP (ref 0–2)
BILIRUB SERPL-MCNC: 0.2 MG/DL — SIGNIFICANT CHANGE UP (ref 0.2–1.2)
BILIRUB UR-MCNC: NEGATIVE — SIGNIFICANT CHANGE UP
BLD GP AB SCN SERPL QL: NEGATIVE — SIGNIFICANT CHANGE UP
BUN SERPL-MCNC: 22 MG/DL — SIGNIFICANT CHANGE UP (ref 7–23)
CALCIUM SERPL-MCNC: 9.7 MG/DL — SIGNIFICANT CHANGE UP (ref 8.4–10.5)
CHLORIDE SERPL-SCNC: 103 MMOL/L — SIGNIFICANT CHANGE UP (ref 96–108)
CO2 SERPL-SCNC: 24 MMOL/L — SIGNIFICANT CHANGE UP (ref 22–31)
COLOR SPEC: SIGNIFICANT CHANGE UP
CREAT SERPL-MCNC: 1.29 MG/DL — SIGNIFICANT CHANGE UP (ref 0.5–1.3)
DIFF PNL FLD: NEGATIVE — SIGNIFICANT CHANGE UP
EOSINOPHIL # BLD AUTO: 0.1 K/UL — SIGNIFICANT CHANGE UP (ref 0–0.5)
EOSINOPHIL NFR BLD AUTO: 3.3 % — SIGNIFICANT CHANGE UP (ref 0–6)
EPI CELLS # UR: 2 /HPF — SIGNIFICANT CHANGE UP
GLUCOSE SERPL-MCNC: 99 MG/DL — SIGNIFICANT CHANGE UP (ref 70–99)
GLUCOSE UR QL: NEGATIVE — SIGNIFICANT CHANGE UP
HCT VFR BLD CALC: 25.6 % — LOW (ref 39–50)
HGB BLD-MCNC: 8.6 G/DL — LOW (ref 13–17)
HYALINE CASTS # UR AUTO: 0 /LPF — SIGNIFICANT CHANGE UP (ref 0–2)
KETONES UR-MCNC: NEGATIVE — SIGNIFICANT CHANGE UP
LEUKOCYTE ESTERASE UR-ACNC: ABNORMAL
LYMPHOCYTES # BLD AUTO: 0.4 K/UL — LOW (ref 1–3.3)
LYMPHOCYTES # BLD AUTO: 21.2 % — SIGNIFICANT CHANGE UP (ref 13–44)
MAGNESIUM SERPL-MCNC: 1.9 MG/DL — SIGNIFICANT CHANGE UP (ref 1.6–2.6)
MCHC RBC-ENTMCNC: 33.4 PG — SIGNIFICANT CHANGE UP (ref 27–34)
MCHC RBC-ENTMCNC: 33.6 GM/DL — SIGNIFICANT CHANGE UP (ref 32–36)
MCV RBC AUTO: 99.4 FL — SIGNIFICANT CHANGE UP (ref 80–100)
MONOCYTES # BLD AUTO: 0 K/UL — SIGNIFICANT CHANGE UP (ref 0–0.9)
MONOCYTES NFR BLD AUTO: 1.1 % — LOW (ref 2–14)
NEUTROPHILS # BLD AUTO: 1.5 K/UL — LOW (ref 1.8–7.4)
NEUTROPHILS NFR BLD AUTO: 74 % — SIGNIFICANT CHANGE UP (ref 43–77)
NITRITE UR-MCNC: NEGATIVE — SIGNIFICANT CHANGE UP
PH UR: 6.5 — SIGNIFICANT CHANGE UP (ref 5–8)
PHOSPHATE SERPL-MCNC: 4.6 MG/DL — HIGH (ref 2.5–4.5)
PLATELET # BLD AUTO: 52 K/UL — LOW (ref 150–400)
POTASSIUM SERPL-MCNC: 4.4 MMOL/L — SIGNIFICANT CHANGE UP (ref 3.5–5.3)
POTASSIUM SERPL-SCNC: 4.4 MMOL/L — SIGNIFICANT CHANGE UP (ref 3.5–5.3)
PROT SERPL-MCNC: 6.2 G/DL — SIGNIFICANT CHANGE UP (ref 6–8.3)
PROT UR-MCNC: NEGATIVE — SIGNIFICANT CHANGE UP
RBC # BLD: 2.58 M/UL — LOW (ref 4.2–5.8)
RBC # FLD: 15.7 % — HIGH (ref 10.3–14.5)
RBC CASTS # UR COMP ASSIST: 3 /HPF — SIGNIFICANT CHANGE UP (ref 0–4)
RH IG SCN BLD-IMP: POSITIVE — SIGNIFICANT CHANGE UP
SODIUM SERPL-SCNC: 139 MMOL/L — SIGNIFICANT CHANGE UP (ref 135–145)
SP GR SPEC: 1.01 — SIGNIFICANT CHANGE UP (ref 1.01–1.02)
UROBILINOGEN FLD QL: NEGATIVE — SIGNIFICANT CHANGE UP
WBC # BLD: 2 K/UL — LOW (ref 3.8–10.5)
WBC # FLD AUTO: 2 K/UL — LOW (ref 3.8–10.5)
WBC UR QL: 44 /HPF — HIGH (ref 0–5)

## 2019-08-02 PROCEDURE — 99232 SBSQ HOSP IP/OBS MODERATE 35: CPT | Mod: GC

## 2019-08-02 PROCEDURE — 99232 SBSQ HOSP IP/OBS MODERATE 35: CPT

## 2019-08-02 RX ORDER — CIPROFLOXACIN LACTATE 400MG/40ML
1 VIAL (ML) INTRAVENOUS
Qty: 30 | Refills: 0
Start: 2019-08-02 | End: 2019-08-31

## 2019-08-02 RX ORDER — CYTARABINE 100 MG
6150 VIAL (EA) INJECTION EVERY 12 HOURS
Refills: 0 | Status: COMPLETED | OUTPATIENT
Start: 2019-08-02 | End: 2019-08-03

## 2019-08-02 RX ORDER — PREDNISOLONE SODIUM PHOSPHATE 1 %
2 DROPS OPHTHALMIC (EYE)
Qty: 0 | Refills: 0 | DISCHARGE
Start: 2019-08-02

## 2019-08-02 RX ORDER — FLUCONAZOLE 150 MG/1
1 TABLET ORAL
Qty: 30 | Refills: 0
Start: 2019-08-02 | End: 2019-08-31

## 2019-08-02 RX ADMIN — ONDANSETRON 8 MILLIGRAM(S): 8 TABLET, FILM COATED ORAL at 13:37

## 2019-08-02 RX ADMIN — Medication 187.17 MILLIGRAM(S): at 13:39

## 2019-08-02 RX ADMIN — SENNA PLUS 2 TABLET(S): 8.6 TABLET ORAL at 17:21

## 2019-08-02 RX ADMIN — Medication 12.5 MILLIGRAM(S): at 21:18

## 2019-08-02 RX ADMIN — Medication 100 MILLIGRAM(S): at 05:37

## 2019-08-02 RX ADMIN — RIVAROXABAN 20 MILLIGRAM(S): KIT at 17:21

## 2019-08-02 RX ADMIN — CHLORHEXIDINE GLUCONATE 1 APPLICATION(S): 213 SOLUTION TOPICAL at 05:37

## 2019-08-02 RX ADMIN — ONDANSETRON 8 MILLIGRAM(S): 8 TABLET, FILM COATED ORAL at 05:37

## 2019-08-02 RX ADMIN — VORICONAZOLE 200 MILLIGRAM(S): 10 INJECTION, POWDER, LYOPHILIZED, FOR SOLUTION INTRAVENOUS at 17:21

## 2019-08-02 RX ADMIN — FINASTERIDE 5 MILLIGRAM(S): 5 TABLET, FILM COATED ORAL at 12:11

## 2019-08-02 RX ADMIN — Medication 2 DROP(S): at 00:30

## 2019-08-02 RX ADMIN — SENNA PLUS 2 TABLET(S): 8.6 TABLET ORAL at 05:38

## 2019-08-02 RX ADMIN — VORICONAZOLE 200 MILLIGRAM(S): 10 INJECTION, POWDER, LYOPHILIZED, FOR SOLUTION INTRAVENOUS at 05:38

## 2019-08-02 RX ADMIN — Medication 100 MILLIGRAM(S): at 14:06

## 2019-08-02 RX ADMIN — Medication 650 MILLIGRAM(S): at 05:38

## 2019-08-02 RX ADMIN — Medication 2 DROP(S): at 12:12

## 2019-08-02 RX ADMIN — Medication 12.5 MILLIGRAM(S): at 12:11

## 2019-08-02 RX ADMIN — SERTRALINE 50 MILLIGRAM(S): 25 TABLET, FILM COATED ORAL at 12:11

## 2019-08-02 RX ADMIN — Medication 2 DROP(S): at 05:38

## 2019-08-02 RX ADMIN — Medication 2 DROP(S): at 17:21

## 2019-08-02 RX ADMIN — ONDANSETRON 8 MILLIGRAM(S): 8 TABLET, FILM COATED ORAL at 21:19

## 2019-08-02 NOTE — ADVANCED PRACTICE NURSE CONSULT - ASSESSMENT
Pt. seen in bed a/ox4,denies any discomfort at t his time.Pt. verbalized understanding of chemotherapy infusion.Reinforced teachings.Labs reviewed by Dr. Goldberg  on rounds.Drug verification done by 2 RN.'s.Pt. has right upper arm PICC .Site WNL,with positive blood return noted and flushing easily with 10 ml NS.Dsg dry and intact.Nystagmus check done with negative result.Pt. received zofran 8 mg IVP .At 1339  Day 5/5 Cytarabine 3gm/k1=4634 mg IV started and infusing well over 3 hours to PICC via alaris pump.Pt. tolerating well.Left pt. comfortable in bed.Primary RN aware of present treatment.

## 2019-08-02 NOTE — PROGRESS NOTE ADULT - PROBLEM SELECTOR PLAN 2
Pt is not neutropenic, afebrile   If febrile, Pan Cx and CXR  Continue Ceftriaxone 2gm IV q day, pending surgical pathology to decide duration, called path dept on 8/1 to rush the report    Continue Voriconazole for Aspergillus of face.  ID consult appreciated Pt is not neutropenic, afebrile   If febrile, Pan Cx and CXR  Discontinue Ceftriaxone today as per ID as surgical pathology showing clean proximal margins    Continue Voriconazole for Aspergillus of face.  ID consult appreciated  will need to go home on prophylactic antibiotics as ANC dropping

## 2019-08-02 NOTE — PROGRESS NOTE ADULT - SUBJECTIVE AND OBJECTIVE BOX
infectious diseases progress note:    DI CONTRERAS is a 59y y. o. Male patient    Patient reports: no new issues    ROS:    EYES:  Negative  blurry vision or double vision  GASTROINTESTINAL:  Negative for nausea, vomiting, diarrhea  -otherwise negative except for subjective    Allergies    No Known Allergies    Intolerances        ANTIBIOTICS/RELEVANT:  antimicrobials  cefTRIAXone   IVPB 2000 milliGRAM(s) IV Intermittent every 24 hours  voriconazole 200 milliGRAM(s) Oral every 12 hours    immunologic:    OTHER:  acetaminophen   Tablet .. 650 milliGRAM(s) Oral every 6 hours PRN  aluminum hydroxide/magnesium hydroxide/simethicone Suspension 30 milliLiter(s) Oral every 6 hours PRN  chlorhexidine 2% Cloths 1 Application(s) Topical <User Schedule>  docusate sodium 100 milliGRAM(s) Oral three times a day  finasteride 5 milliGRAM(s) Oral daily  metoclopramide Injectable 10 milliGRAM(s) IV Push every 6 hours PRN  metoprolol succinate ER 12.5 milliGRAM(s) Oral two times a day  nicotine -  14 mG/24Hr(s) Patch 1 patch Transdermal daily  ondansetron Injectable 8 milliGRAM(s) IV Push every 8 hours  oxyCODONE    IR 5 milliGRAM(s) Oral every 4 hours PRN  polyethylene glycol 3350 17 Gram(s) Oral two times a day PRN  prednisoLONE acetate 1% Suspension 2 Drop(s) Both EYES every 6 hours  rivaroxaban 20 milliGRAM(s) Oral with dinner  senna 2 Tablet(s) Oral two times a day  sertraline 50 milliGRAM(s) Oral daily  sodium chloride 0.9%. 1000 milliLiter(s) IV Continuous <Continuous>      Objective:  Vital Signs Last 24 Hrs  T(C): 36.2 (02 Aug 2019 09:20), Max: 36.5 (01 Aug 2019 17:41)  T(F): 97.1 (02 Aug 2019 09:20), Max: 97.7 (01 Aug 2019 17:41)  HR: 67 (02 Aug 2019 09:20) (65 - 73)  BP: 106/63 (02 Aug 2019 09:20) (96/49 - 113/67)  BP(mean): --  RR: 18 (02 Aug 2019 09:20) (18 - 20)  SpO2: 98% (02 Aug 2019 09:20) (95% - 99%)    T(C): 36.2 (19 @ 09:20), Max: 36.7 (19 @ 13:04)  T(C): 36.2 (19 @ 09:20), Max: 37.2 (19 @ 21:11)  T(C): 36.2 (19 @ 09:20), Max: 37.2 (19 @ 21:11)    PHYSICAL EXAM:  Constitutional: Well-developed, well nourished  Eyes: PERRLA, EOMI  Ear/Nose/Throat: oropharynx normal	  Neck: no JVD, no lymphadenopathy, supple  Respiratory: no accessory muscle use  Cardiovascular: RRR,   Gastrointestinal: soft, NT  Extremities: no clubbing, no cyanosis, edema absent      LABS:                        8.6    2.0   )-----------( 52       ( 02 Aug 2019 07:34 )             25.6       2.0  @ 07:34  2.6  @ 06:49  3.6  @ 06:52  6.4  @ 07:09  7.0  @ 07:19  8.0  @ 06:44  9.9  @ 07:00      08-    139  |  103  |  22  ----------------------------<  99  4.4   |  24  |  1.29    Ca    9.7      02 Aug 2019 07:34  Phos  4.6       Mg     1.9         TPro  6.2  /  Alb  3.7  /  TBili  0.2  /  DBili  x   /  AST  23  /  ALT  19  /  AlkPhos  62        Creatinine, Serum: 1.29 mg/dL (19 @ 07:34)  Creatinine, Serum: 1.18 mg/dL (19 @ 06:49)  Creatinine, Serum: 1.33 mg/dL (19 @ 06:51)  Creatinine, Serum: 1.26 mg/dL (19 @ 07:09)  Creatinine, Serum: 1.19 mg/dL (19 @ 07:18)  Creatinine, Serum: 1.19 mg/dL (19 @ 06:44)  Creatinine, Serum: 1.22 mg/dL (19 @ 06:58)        Urinalysis Basic - ( 02 Aug 2019 08:12 )    Color: Light Yellow / Appearance: Clear / S.014 / pH: x  Gluc: x / Ketone: Negative  / Bili: Negative / Urobili: Negative   Blood: x / Protein: Negative / Nitrite: Negative   Leuk Esterase: Large / RBC: 3 /hpf / WBC 44 /HPF   Sq Epi: x / Non Sq Epi: 2 /hpf / Bacteria: Negative    MICROBIOLOGY:        RADIOLOGY & ADDITIONAL STUDIES:    Surgical Pathology Report (19 @ 14:41)    Surgical Pathology Report:   ACCESSION No:  [id number]    DI CONTRERAS                        2        Surgical Final Report          Final Diagnosis  1     Dirty skin soft tissue and bone right hallux  - Soft tissue necrosis  - Osteomyelitis acute, extensive    2     Boneclean margin right hallux proximal phalanx  - Viable bone fragment    Verified by: Rolan Escamilla MD PhD  (Electronic Signature)  Reported on: 19 16:03 EDT, [address] [place] [address]  _________________________________________________________________    Clinical History  Right toe osteomyelitis    Specimen(s) Submitted  1     Dirty skin soft tissue and bone right hallux  2     Bone clean margin right hallux proximal phalanx    Gross Description  1. The specimen is received in formalin and the specimen  container is labeled: Three skin, soft tissue and bone right  hallux.  It consists of a 4.5 x 3.2 x 2.5 cm disarticulated great  toe.   The skin is tan, wrinkled and is grossly unremarkable.  The toenail is tan-brown, softened with approximately 50% of the  toe nail present.  A 0.9 x 0.5 cm brown ulcerated lesion is  present on the nail bed, 1 cm from the skin and soft tissue  margin of resection.  The skin and soft tissue margin of  resection is inked black.  Cut surface is a yellow and soft with  yellow, porous bone that cuts with moderate ease.  Also received  in the specimen container is a 2.3 x 1.3 x 1.1 cm tan, smoothly  resected, ovoid fragment of bone and a 1.5 x 1.1 x 0.2 cm tan  soft tissue fragment. Representative sections submitted following  fixation and decalcification.  Three cassettes as follows:  1A: Lesion and underlying bone and perpendicular skin soft tissue  margin resection  1B: En face resection margin of additional bone tissue fragment  1C: Additional soft tissue    2. The specimen is received in formalin and the specimen  container is labeled: Bone clear margin right hallux proximal  phalanx.  It consists of a 1.4 x 1 x 0.7 cm tan, irregular, firm  bony tissue fragment.  Bisected and entirely submitted following  fixation and decalcification.  One cassette.                DI CONTRERAS                        2        Surgical Final Report          In addition to other data that may appear on the specimen  containers, all labels have been inspected to confirm the  presence of the patient's name and date of birth.  IGLESIA Kaufman 2019 11:04

## 2019-08-02 NOTE — PROGRESS NOTE ADULT - ASSESSMENT
Mr. Delong is a 59 year old male with AML previously on anthracycline based chemotherapy with a reported EF in the 20's at this time, who was recently admitted for chemotherapy and a toe infection, now returns for amputation of right toe due to osteomyelitis followed by consolidation chemotherapy with cycle 1 HiDAc.    Recently, he had an echocardiogram in our office 7/2019 with mild MR, eccentric LVH with moderate LV dysfunction (EF 35-40%), and mild aortic root dilatation. MUGA with Dilated left ventricle with generalized hypokinesia and EF 28 %, slightly lower than the echocardiogram.  He also has a history of Factor V Leiden and VTE, and has been on Xarelto.    - No sign of acute ischemia, nor does he have any chest pain or difficulty breathing. His last EKG demonstrates a RBBB, LAD, RVH and non-specific st abn.  He reports a cath about 5 years ago at [facility] that was unremarkable and a stress test more recently, though results of this are unavailable.  -  He seems to be tolerating Toprol XL 12.5 bid which can be continued.  - Because of mild NAGA, we held off on ace-inhibitor, especially in the setting of borderline BP. Ideally, we would start him on lisinopril 2.5 mg po daily.  This does not need to be started now given his soft BP  - Xarelto 20 if platelet count remains ok.  - Watch creatinine and electrolytes. Keep K>4, Mg>2  - Tolerated toe amputation without cardiac complications.  - Cont chemo. Monitor for signs of vol ol. Euvolemic today  - Will follow with you

## 2019-08-02 NOTE — PROGRESS NOTE ADULT - PROBLEM SELECTOR PLAN 3
Status post Right great toe amputation 7/26  Continue abx  Podiatry following Status post Right great toe amputation 7/26  abx discontinued today as per ID   Podiatry following

## 2019-08-02 NOTE — PROGRESS NOTE ADULT - ASSESSMENT
58yo M with PMHx of HFrEF (recent EF 28%), COLLEEN, DVT/PE, HTN, Factor V Leiden and AML diagnosed in 4/2019 s/p induction chemotherapy now in morphologic remission admitted for amputation of right toe due to osteomyelitis followed by consolidation chemotherapy with cycle 1 HiDAc.    Partial amputation of right great toe 26-Jul-2019 14:54:10     pathology Final Diagnosis  1     Dirty skin soft tissue and bone right hallux  - Soft tissue necrosis  - Osteomyelitis acute, extensive    2     Boneclean margin right hallux proximal phalanx  - Viable bone fragment

## 2019-08-02 NOTE — PROGRESS NOTE ADULT - PROBLEM SELECTOR PLAN 1
Started Cycle 1 consolidation HiDAc   Monitor CBC/Lytes and transfuse replete PRN  Keep K>4 and Mg>2  Strict Is and Os/Daily weights/Mouth Care  Monitor for cerebellar toxicity  Pred forte eye drops  IVF  Antiemetics. Started Cycle 1 consolidation HiDAc   Monitor CBC/Lytes and transfuse replete PRN  Keep K>4 and Mg>2  Strict Is and Os/Daily weights/Mouth Care  Monitor for cerebellar toxicity  Pred forte eye drops  IVF  Antiemetics.  Plan for discharge tomorrow 8/3

## 2019-08-02 NOTE — DISCHARGE NOTE NURSING/CASE MANAGEMENT/SOCIAL WORK - NSDCFUADDAPPT_GEN_ALL_CORE_FT
You will be called regarding follow up with Dr. Coelho. If you do not hear from them by Monday 8/5 please call [phone]  To the [facility] for possible platelets on Wednesday 8/7/19 at 1230pm. You then have an appointment to see Dr. Gaytan at 2pm  To the [facility] for possible platelets on  Saturday 8/10/19 at 1pm    Podiatry Discharge Instructions:  Follow up: Please follow up with Dr. Estrada within 1 week of discharge from the hospital, please call [phone] ( [address] [facility]) for appointment and discuss that you recently were seen in the hospital.  Wound Care: Please leave your dressing clean dry intact until your follow up appointment  Weight bearing: Please weight bear as tolerated in a surgical shoe.  Antibiotics: Please continue as instructed.

## 2019-08-02 NOTE — PROGRESS NOTE ADULT - SUBJECTIVE AND OBJECTIVE BOX
[facility] - Ede Zamudio, Riki, Edgar, Gem, Tabitha Garcia  Office Number:  [phone]    Patient resting comfortably in bed in NAD.  Laying flat with no respiratory distress.  No complaints of chest pain, dyspnea, palpitations, PND, or orthopnea.    ROS: negative unless otherwise mentioned.    Telemetry:  off    MEDICATIONS  (STANDING):  cefTRIAXone   IVPB 2000 milliGRAM(s) IV Intermittent every 24 hours  chlorhexidine 2% Cloths 1 Application(s) Topical <User Schedule>  docusate sodium 100 milliGRAM(s) Oral three times a day  finasteride 5 milliGRAM(s) Oral daily  metoprolol succinate ER 12.5 milliGRAM(s) Oral two times a day  nicotine -  14 mG/24Hr(s) Patch 1 patch Transdermal daily  ondansetron Injectable 8 milliGRAM(s) IV Push every 8 hours  prednisoLONE acetate 1% Suspension 2 Drop(s) Both EYES every 6 hours  rivaroxaban 20 milliGRAM(s) Oral with dinner  senna 2 Tablet(s) Oral two times a day  sertraline 50 milliGRAM(s) Oral daily  sodium chloride 0.9%. 1000 milliLiter(s) (20 mL/Hr) IV Continuous <Continuous>  voriconazole 200 milliGRAM(s) Oral every 12 hours    MEDICATIONS  (PRN):  acetaminophen   Tablet .. 650 milliGRAM(s) Oral every 6 hours PRN Mild Pain (1 - 3)  aluminum hydroxide/magnesium hydroxide/simethicone Suspension 30 milliLiter(s) Oral every 6 hours PRN Dyspepsia  metoclopramide Injectable 10 milliGRAM(s) IV Push every 6 hours PRN nausea and or vomiting  oxyCODONE    IR 5 milliGRAM(s) Oral every 4 hours PRN Moderate Pain (4 - 6)  polyethylene glycol 3350 17 Gram(s) Oral two times a day PRN Constipation      Allergies    No Known Allergies    Intolerances        Vital Signs Last 24 Hrs  T(C): 36.4 (02 Aug 2019 05:21), Max: 36.5 (01 Aug 2019 17:41)  T(F): 97.5 (02 Aug 2019 05:21), Max: 97.7 (01 Aug 2019 17:41)  HR: 65 (02 Aug 2019 05:21) (65 - 73)  BP: 101/55 (02 Aug 2019 05:21) (96/49 - 113/67)  BP(mean): --  RR: 18 (02 Aug 2019 05:21) (18 - 20)  SpO2: 99% (02 Aug 2019 05:21) (95% - 99%)    I&O's Summary    01 Aug 2019 07:01  -  02 Aug 2019 07:00  --------------------------------------------------------  IN: 972 mL / OUT: 900 mL / NET: 72 mL        ON EXAM:    Constitutional: NAD, awake and alert, well-developed  HEENT: Moist Mucous Membranes, Anicteric  Pulmonary: Decreased breath sounds b/l. No rales, crackles or wheeze appreciated.   Cardiovascular: Regular, S1 and S2, No murmurs, rubs, gallops or clicks  Gastrointestinal: Bowel Sounds present, soft, nontender.   Lymph: No peripheral edema. No lymphadenopathy.  Skin: No visible rashes or ulcers.  Psych:  Mood & affect appropriate for situation      LABS: All Labs Reviewed:                        8.6    2.0   )-----------( 52       ( 02 Aug 2019 07:34 )             25.6                         8.2    2.6   )-----------( 56       ( 01 Aug 2019 06:49 )             25.2                         8.7    3.6   )-----------( 69       ( 31 Jul 2019 06:52 )             26.5     02 Aug 2019 07:34    139    |  103    |  22     ----------------------------<  99     4.4     |  24     |  1.29   01 Aug 2019 06:49    135    |  101    |  22     ----------------------------<  134    4.1     |  24     |  1.18   31 Jul 2019 06:51    141    |  103    |  19     ----------------------------<  120    4.3     |  23     |  1.33     Ca    9.7        02 Aug 2019 07:34  Ca    9.3        01 Aug 2019 06:49  Ca    9.0        31 Jul 2019 06:51  Phos  4.6       02 Aug 2019 07:34  Phos  4.2       01 Aug 2019 06:49  Phos  4.4       31 Jul 2019 06:51  Mg     1.9       02 Aug 2019 07:34  Mg     2.0       01 Aug 2019 06:49  Mg     1.8       31 Jul 2019 06:51    TPro  6.2    /  Alb  3.7    /  TBili  0.2    /  DBili  x      /  AST  23     /  ALT  19     /  AlkPhos  62     02 Aug 2019 07:34  TPro  5.9    /  Alb  3.5    /  TBili  0.2    /  DBili  x      /  AST  17     /  ALT  15     /  AlkPhos  59     01 Aug 2019 06:49  TPro  5.9    /  Alb  3.7    /  TBili  0.3    /  DBili  x      /  AST  16     /  ALT  16     /  AlkPhos  68     31 Jul 2019 06:51          Blood Culture:

## 2019-08-02 NOTE — PROGRESS NOTE ADULT - PROBLEM SELECTOR PLAN 2
per oncology    Thank you for consulting us and involving us in the management of this most interesting and challenging case.     Please Call with any further questions

## 2019-08-02 NOTE — PROGRESS NOTE ADULT - ATTENDING COMMENTS
58yo M with AML in CR admitted for right big toe amputation due to osteomyelitis, done 7/26.   Podiatry following -weight bear as tolerated in surgical shoe and keep dressing clean dry and intact   Resumed Xarelto for Factor V Leiden, will hold once plts <50.   Started HIDAC consolidation, today is day 3 of therapy.   Patient with mild fatigue but otherwise doing well.   Continue IVFs, Supportive care.  Awaiting pathology results from amputation as if margins uninvolved will not need long term IV Ceftriaxone. At that point would only need neutropenic prophylaxis (PO Levaquin).   [facility] 58yo M with AML in CR admitted for right big toe amputation due to osteomyelitis, done 7/26.   Podiatry following -weight bear as tolerated in surgical shoe and keep dressing clean dry and intact   Resumed Xarelto for Factor V Leiden, will hold once plts <50.   Started HIDAC consolidation, today is day 4 of therapy.   Patient with mild fatigue but otherwise doing well.   Continue IVFs, Supportive care.  Pathology shows clean margins, no need for extended IV antibiotics, will d/c ceftriaxone and give levaquin/diflucan for neutropenia ppx on discharge.   Home with PICC care tomorrow.

## 2019-08-02 NOTE — DISCHARGE NOTE NURSING/CASE MANAGEMENT/SOCIAL WORK - NSDCDPATPORTLINK_GEN_ALL_CORE
You can access the [facility] Portal, offered by [facility], by registering with the following website: [email or web address]

## 2019-08-02 NOTE — PROGRESS NOTE ADULT - ASSESSMENT
This is a 60yo M with PMHx of HFrEF (recent EF 28%), COLLEEN, DVT/PE, HTN, Factor V Leiden and AML diagnosed in 4/2019 status post induction chemotherapy now in morphologic remission admitted for amputation of right great toe due(done on 7/26) due to osteomyelitis.  Consolidation chemotherapy with cycle 1 HiDAc. Pt has anemia and thrombocytopenia due to chemo and or disease.

## 2019-08-02 NOTE — PROGRESS NOTE ADULT - SUBJECTIVE AND OBJECTIVE BOX
Diagnosis: AML    Protocol/Chemo Regimen:  Cycle 1 of HiDAc    Day: 5    Pt endorsed:   Review of Systems: Denies any chest pain, palpitation, SOB, abdominal pain, nausea, vomiting or diarrhea.    Pain scale: Denies    Diet: Regular    Allergies: No Known Allergies    ANTIMICROBIALS  cefTRIAXone   IVPB 2000 milliGRAM(s) IV Intermittent every 24 hours  voriconazole 200 milliGRAM(s) Oral every 12 hours      HEME/ONC MEDICATIONS  rivaroxaban 20 milliGRAM(s) Oral with dinner      STANDING MEDICATIONS  chlorhexidine 2% Cloths 1 Application(s) Topical <User Schedule>  docusate sodium 100 milliGRAM(s) Oral three times a day  finasteride 5 milliGRAM(s) Oral daily  metoprolol succinate ER 12.5 milliGRAM(s) Oral two times a day  nicotine -  14 mG/24Hr(s) Patch 1 patch Transdermal daily  ondansetron Injectable 8 milliGRAM(s) IV Push every 8 hours  prednisoLONE acetate 1% Suspension 2 Drop(s) Both EYES every 6 hours  senna 2 Tablet(s) Oral two times a day  sertraline 50 milliGRAM(s) Oral daily  sodium chloride 0.9%. 1000 milliLiter(s) IV Continuous <Continuous>      PRN MEDICATIONS  acetaminophen   Tablet .. 650 milliGRAM(s) Oral every 6 hours PRN  aluminum hydroxide/magnesium hydroxide/simethicone Suspension 30 milliLiter(s) Oral every 6 hours PRN  metoclopramide Injectable 10 milliGRAM(s) IV Push every 6 hours PRN  oxyCODONE    IR 5 milliGRAM(s) Oral every 4 hours PRN  polyethylene glycol 3350 17 Gram(s) Oral two times a day PRN        Vital Signs Last 24 Hrs  T(C): 36.4 (02 Aug 2019 05:21), Max: 36.5 (01 Aug 2019 17:41)  T(F): 97.5 (02 Aug 2019 05:21), Max: 97.7 (01 Aug 2019 17:41)  HR: 65 (02 Aug 2019 05:21) (63 - 73)  BP: 101/55 (02 Aug 2019 05:21) (96/49 - 114/62)  BP(mean): --  RR: 18 (02 Aug 2019 05:21) (18 - 20)  SpO2: 99% (02 Aug 2019 05:21) (95% - 99%)    PHYSICAL EXAM  General: NAD  HEENT: PERRLA, EOMOI, clear oropharynx, anicteric sclera, pink conjunctiva  Neck: supple  CV: (+) S1/S2 RRR  Lungs: clear to auscultation, no wheezes or rales  Abdomen: soft, non-tender, non-distended (+) BS  Ext: no clubbing, cyanosis or edema  Skin: no rashes and no petechiae  Neuro: alert and oriented X 3, no focal deficits  Central Line: PICC Diagnosis: AML    Protocol/Chemo Regimen:  Cycle 1 of HiDAc    Day: 5    Pt endorsed:   Review of Systems: Denies any chest pain, palpitation, SOB, abdominal pain, nausea, vomiting or diarrhea.    Pain scale: Denies    Diet: Regular    Allergies: No Known Allergies    ANTIMICROBIALS  cefTRIAXone   IVPB 2000 milliGRAM(s) IV Intermittent every 24 hours  voriconazole 200 milliGRAM(s) Oral every 12 hours      HEME/ONC MEDICATIONS  rivaroxaban 20 milliGRAM(s) Oral with dinner      STANDING MEDICATIONS  chlorhexidine 2% Cloths 1 Application(s) Topical <User Schedule>  docusate sodium 100 milliGRAM(s) Oral three times a day  finasteride 5 milliGRAM(s) Oral daily  metoprolol succinate ER 12.5 milliGRAM(s) Oral two times a day  nicotine -  14 mG/24Hr(s) Patch 1 patch Transdermal daily  ondansetron Injectable 8 milliGRAM(s) IV Push every 8 hours  prednisoLONE acetate 1% Suspension 2 Drop(s) Both EYES every 6 hours  senna 2 Tablet(s) Oral two times a day  sertraline 50 milliGRAM(s) Oral daily  sodium chloride 0.9%. 1000 milliLiter(s) IV Continuous <Continuous>      PRN MEDICATIONS  acetaminophen   Tablet .. 650 milliGRAM(s) Oral every 6 hours PRN  aluminum hydroxide/magnesium hydroxide/simethicone Suspension 30 milliLiter(s) Oral every 6 hours PRN  metoclopramide Injectable 10 milliGRAM(s) IV Push every 6 hours PRN  oxyCODONE    IR 5 milliGRAM(s) Oral every 4 hours PRN  polyethylene glycol 3350 17 Gram(s) Oral two times a day PRN        Vital Signs Last 24 Hrs  T(C): 36.4 (02 Aug 2019 05:21), Max: 36.5 (01 Aug 2019 17:41)  T(F): 97.5 (02 Aug 2019 05:21), Max: 97.7 (01 Aug 2019 17:41)  HR: 65 (02 Aug 2019 05:21) (63 - 73)  BP: 101/55 (02 Aug 2019 05:21) (96/49 - 114/62)  BP(mean): --  RR: 18 (02 Aug 2019 05:21) (18 - 20)  SpO2: 99% (02 Aug 2019 05:21) (95% - 99%)    PHYSICAL EXAM  General: NAD  HEENT: PERRLA, EOMOI, clear oropharynx, anicteric sclera, pink conjunctiva  Neck: supple  CV: (+) S1/S2 RRR  Lungs: clear to auscultation, no wheezes or rales  Abdomen: soft, non-tender, non-distended (+) BS  Ext: no clubbing, cyanosis or edema  Skin: no rashes and no petechiae  Neuro: alert and oriented X 3, no focal deficits  Central Line: PICC    LABS:                          8.6    2.0   )-----------( 52       ( 02 Aug 2019 07:34 )             25.6         Mean Cell Volume : 99.4 fl  Mean Cell Hemoglobin : 33.4 pg  Mean Cell Hemoglobin Concentration : 33.6 gm/dL  Auto Neutrophil # : x  Auto Lymphocyte # : x  Auto Monocyte # : x  Auto Eosinophil # : x  Auto Basophil # : x  Auto Neutrophil % : x  Auto Lymphocyte % : x  Auto Monocyte % : x  Auto Eosinophil % : x  Auto Basophil % : x      08-02    139  |  103  |  22  ----------------------------<  99  4.4   |  24  |  1.29    Ca    9.7      02 Aug 2019 07:34  Phos  4.6     08-02  Mg     1.9     08-02    TPro  6.2  /  Alb  3.7  /  TBili  0.2  /  DBili  x   /  AST  23  /  ALT  19  /  AlkPhos  62  08-02      Mg 1.9  Phos 4.6 Diagnosis: AML    Protocol/Chemo Regimen:  Cycle 1 of HiDAc    Day: 5    Pt endorsed: no complaints    Review of Systems: Denies any chest pain, palpitation, SOB, abdominal pain, nausea, vomiting or diarrhea.    Pain scale: Denies    Diet: Regular    Allergies: No Known Allergies    ANTIMICROBIALS  cefTRIAXone   IVPB 2000 milliGRAM(s) IV Intermittent every 24 hours  voriconazole 200 milliGRAM(s) Oral every 12 hours      HEME/ONC MEDICATIONS  rivaroxaban 20 milliGRAM(s) Oral with dinner      STANDING MEDICATIONS  chlorhexidine 2% Cloths 1 Application(s) Topical <User Schedule>  docusate sodium 100 milliGRAM(s) Oral three times a day  finasteride 5 milliGRAM(s) Oral daily  metoprolol succinate ER 12.5 milliGRAM(s) Oral two times a day  nicotine -  14 mG/24Hr(s) Patch 1 patch Transdermal daily  ondansetron Injectable 8 milliGRAM(s) IV Push every 8 hours  prednisoLONE acetate 1% Suspension 2 Drop(s) Both EYES every 6 hours  senna 2 Tablet(s) Oral two times a day  sertraline 50 milliGRAM(s) Oral daily  sodium chloride 0.9%. 1000 milliLiter(s) IV Continuous <Continuous>      PRN MEDICATIONS  acetaminophen   Tablet .. 650 milliGRAM(s) Oral every 6 hours PRN  aluminum hydroxide/magnesium hydroxide/simethicone Suspension 30 milliLiter(s) Oral every 6 hours PRN  metoclopramide Injectable 10 milliGRAM(s) IV Push every 6 hours PRN  oxyCODONE    IR 5 milliGRAM(s) Oral every 4 hours PRN  polyethylene glycol 3350 17 Gram(s) Oral two times a day PRN        Vital Signs Last 24 Hrs  T(C): 36.4 (02 Aug 2019 05:21), Max: 36.5 (01 Aug 2019 17:41)  T(F): 97.5 (02 Aug 2019 05:21), Max: 97.7 (01 Aug 2019 17:41)  HR: 65 (02 Aug 2019 05:21) (63 - 73)  BP: 101/55 (02 Aug 2019 05:21) (96/49 - 114/62)  BP(mean): --  RR: 18 (02 Aug 2019 05:21) (18 - 20)  SpO2: 99% (02 Aug 2019 05:21) (95% - 99%)    PHYSICAL EXAM  General: NAD  HEENT: PERRLA, EOMOI, clear oropharynx, anicteric sclera, pink conjunctiva  Neck: supple  CV: (+) S1/S2 RRR  Lungs: clear to auscultation, no wheezes or rales  Abdomen: soft, non-tender, non-distended (+) BS  Ext: no clubbing, cyanosis or edema  Skin: no rashes and no petechiae  Neuro: alert and oriented X 3, no focal deficits  Central Line: PICC c/d/i     LABS:                          8.6    2.0   )-----------( 52       ( 02 Aug 2019 07:34 )             25.6         Mean Cell Volume : 99.4 fl  Mean Cell Hemoglobin : 33.4 pg  Mean Cell Hemoglobin Concentration : 33.6 gm/dL  Auto Neutrophil # : x  Auto Lymphocyte # : x  Auto Monocyte # : x  Auto Eosinophil # : x  Auto Basophil # : x  Auto Neutrophil % : x  Auto Lymphocyte % : x  Auto Monocyte % : x  Auto Eosinophil % : x  Auto Basophil % : x      08-02    139  |  103  |  22  ----------------------------<  99  4.4   |  24  |  1.29    Ca    9.7      02 Aug 2019 07:34  Phos  4.6     08-02  Mg     1.9     08-02    TPro  6.2  /  Alb  3.7  /  TBili  0.2  /  DBili  x   /  AST  23  /  ALT  19  /  AlkPhos  62  08-02      Mg 1.9  Phos 4.6

## 2019-08-03 VITALS
TEMPERATURE: 96 F | OXYGEN SATURATION: 100 % | DIASTOLIC BLOOD PRESSURE: 69 MMHG | RESPIRATION RATE: 18 BRPM | SYSTOLIC BLOOD PRESSURE: 116 MMHG | HEART RATE: 65 BPM

## 2019-08-03 LAB
ALBUMIN SERPL ELPH-MCNC: 3.6 G/DL — SIGNIFICANT CHANGE UP (ref 3.3–5)
ALP SERPL-CCNC: 61 U/L — SIGNIFICANT CHANGE UP (ref 40–120)
ALT FLD-CCNC: 19 U/L — SIGNIFICANT CHANGE UP (ref 10–45)
ANION GAP SERPL CALC-SCNC: 14 MMOL/L — SIGNIFICANT CHANGE UP (ref 5–17)
AST SERPL-CCNC: 23 U/L — SIGNIFICANT CHANGE UP (ref 10–40)
BASOPHILS # BLD AUTO: 0 K/UL — SIGNIFICANT CHANGE UP (ref 0–0.2)
BASOPHILS NFR BLD AUTO: 1 % — SIGNIFICANT CHANGE UP (ref 0–2)
BILIRUB SERPL-MCNC: 0.3 MG/DL — SIGNIFICANT CHANGE UP (ref 0.2–1.2)
BUN SERPL-MCNC: 22 MG/DL — SIGNIFICANT CHANGE UP (ref 7–23)
CALCIUM SERPL-MCNC: 9.3 MG/DL — SIGNIFICANT CHANGE UP (ref 8.4–10.5)
CHLORIDE SERPL-SCNC: 103 MMOL/L — SIGNIFICANT CHANGE UP (ref 96–108)
CO2 SERPL-SCNC: 22 MMOL/L — SIGNIFICANT CHANGE UP (ref 22–31)
CREAT SERPL-MCNC: 1.18 MG/DL — SIGNIFICANT CHANGE UP (ref 0.5–1.3)
CULTURE RESULTS: NO GROWTH — SIGNIFICANT CHANGE UP
EOSINOPHIL # BLD AUTO: 0.1 K/UL — SIGNIFICANT CHANGE UP (ref 0–0.5)
EOSINOPHIL NFR BLD AUTO: 2.9 % — SIGNIFICANT CHANGE UP (ref 0–6)
GLUCOSE SERPL-MCNC: 94 MG/DL — SIGNIFICANT CHANGE UP (ref 70–99)
HCT VFR BLD CALC: 24.6 % — LOW (ref 39–50)
HGB BLD-MCNC: 8.2 G/DL — LOW (ref 13–17)
LYMPHOCYTES # BLD AUTO: 0.3 K/UL — LOW (ref 1–3.3)
LYMPHOCYTES # BLD AUTO: 14.9 % — SIGNIFICANT CHANGE UP (ref 13–44)
MAGNESIUM SERPL-MCNC: 1.8 MG/DL — SIGNIFICANT CHANGE UP (ref 1.6–2.6)
MCHC RBC-ENTMCNC: 32.9 PG — SIGNIFICANT CHANGE UP (ref 27–34)
MCHC RBC-ENTMCNC: 33.3 GM/DL — SIGNIFICANT CHANGE UP (ref 32–36)
MCV RBC AUTO: 98.7 FL — SIGNIFICANT CHANGE UP (ref 80–100)
MONOCYTES # BLD AUTO: 0 K/UL — SIGNIFICANT CHANGE UP (ref 0–0.9)
MONOCYTES NFR BLD AUTO: 1.3 % — LOW (ref 2–14)
NEUTROPHILS # BLD AUTO: 1.4 K/UL — LOW (ref 1.8–7.4)
NEUTROPHILS NFR BLD AUTO: 79.9 % — HIGH (ref 43–77)
PHOSPHATE SERPL-MCNC: 4.4 MG/DL — SIGNIFICANT CHANGE UP (ref 2.5–4.5)
PLATELET # BLD AUTO: 37 K/UL — LOW (ref 150–400)
POTASSIUM SERPL-MCNC: 4.2 MMOL/L — SIGNIFICANT CHANGE UP (ref 3.5–5.3)
POTASSIUM SERPL-SCNC: 4.2 MMOL/L — SIGNIFICANT CHANGE UP (ref 3.5–5.3)
PROT SERPL-MCNC: 5.8 G/DL — LOW (ref 6–8.3)
RBC # BLD: 2.49 M/UL — LOW (ref 4.2–5.8)
RBC # FLD: 15.2 % — HIGH (ref 10.3–14.5)
SODIUM SERPL-SCNC: 139 MMOL/L — SIGNIFICANT CHANGE UP (ref 135–145)
SPECIMEN SOURCE: SIGNIFICANT CHANGE UP
WBC # BLD: 1.8 K/UL — LOW (ref 3.8–10.5)
WBC # FLD AUTO: 1.8 K/UL — LOW (ref 3.8–10.5)

## 2019-08-03 PROCEDURE — 99238 HOSP IP/OBS DSCHRG MGMT 30/<: CPT | Mod: GC

## 2019-08-03 PROCEDURE — 99232 SBSQ HOSP IP/OBS MODERATE 35: CPT

## 2019-08-03 RX ORDER — METOPROLOL TARTRATE 50 MG
0.5 TABLET ORAL
Qty: 30 | Refills: 1
Start: 2019-08-03 | End: 2019-10-01

## 2019-08-03 RX ORDER — SERTRALINE 25 MG/1
2 TABLET, FILM COATED ORAL
Qty: 60 | Refills: 0
Start: 2019-08-03 | End: 2019-09-01

## 2019-08-03 RX ORDER — FINASTERIDE 5 MG/1
1 TABLET, FILM COATED ORAL
Qty: 30 | Refills: 0
Start: 2019-08-03 | End: 2019-09-01

## 2019-08-03 RX ORDER — DIPHENHYDRAMINE HCL 50 MG
25 CAPSULE ORAL ONCE
Refills: 0 | Status: COMPLETED | OUTPATIENT
Start: 2019-08-03 | End: 2019-08-03

## 2019-08-03 RX ORDER — TAMSULOSIN HYDROCHLORIDE 0.4 MG/1
1 CAPSULE ORAL
Qty: 0 | Refills: 0 | DISCHARGE

## 2019-08-03 RX ORDER — TAMSULOSIN HYDROCHLORIDE 0.4 MG/1
1 CAPSULE ORAL
Qty: 30 | Refills: 0
Start: 2019-08-03 | End: 2019-09-01

## 2019-08-03 RX ADMIN — SERTRALINE 50 MILLIGRAM(S): 25 TABLET, FILM COATED ORAL at 11:30

## 2019-08-03 RX ADMIN — SODIUM CHLORIDE 20 MILLILITER(S): 9 INJECTION INTRAMUSCULAR; INTRAVENOUS; SUBCUTANEOUS at 11:31

## 2019-08-03 RX ADMIN — Medication 187.17 MILLIGRAM(S): at 01:32

## 2019-08-03 RX ADMIN — Medication 2 DROP(S): at 06:33

## 2019-08-03 RX ADMIN — Medication 2 DROP(S): at 00:19

## 2019-08-03 RX ADMIN — Medication 25 MILLIGRAM(S): at 11:31

## 2019-08-03 RX ADMIN — Medication 12.5 MILLIGRAM(S): at 09:46

## 2019-08-03 RX ADMIN — Medication 2 DROP(S): at 11:30

## 2019-08-03 RX ADMIN — FINASTERIDE 5 MILLIGRAM(S): 5 TABLET, FILM COATED ORAL at 11:30

## 2019-08-03 RX ADMIN — VORICONAZOLE 200 MILLIGRAM(S): 10 INJECTION, POWDER, LYOPHILIZED, FOR SOLUTION INTRAVENOUS at 06:33

## 2019-08-03 RX ADMIN — CHLORHEXIDINE GLUCONATE 1 APPLICATION(S): 213 SOLUTION TOPICAL at 06:33

## 2019-08-03 NOTE — PROGRESS NOTE ADULT - PROBLEM SELECTOR PLAN 2
Pt is not neutropenic, afebrile   If febrile, Pan Cx and CXR  Discontinue Ceftriaxone today as per ID as surgical pathology showing clean proximal margins    Continue Voriconazole for Aspergillus of face.  ID consult appreciated  will need to go home on prophylactic antibiotics as ANC dropping Pt is not neutropenic, afebrile   If febrile, Pan Cx and CXR  8/2 Ceftriaxone discontinued per ID as surgical pathology from amputation showing clean proximal margins    Continue Voriconazole for Aspergillus of face  ID following

## 2019-08-03 NOTE — PROGRESS NOTE ADULT - PROBLEM SELECTOR PROBLEM 1
AML (acute myeloid leukemia)
Osteomyelitis
Osteomyelitis
AML (acute myeloid leukemia)

## 2019-08-03 NOTE — PROGRESS NOTE ADULT - PROBLEM SELECTOR PLAN 6
Continue Xarelto as above  D/C if PLT < 50K    Contact Information [phone] No pharmacologic ppx 2/2 thrombocytopenia      Contact Information [phone]

## 2019-08-03 NOTE — PROGRESS NOTE ADULT - SUBJECTIVE AND OBJECTIVE BOX
[facility] - Ede Zamudio, Riki, Edgar, Gem, Tabitha Garcia  Office Number:  [phone]    Patient resting comfortably in bed in NAD.  Laying flat with no respiratory distress.  No complaints of chest pain, dyspnea, palpitations, PND, or orthopnea.    ROS: negative unless otherwise mentioned.    Telemetry:  off    MEDICATIONS  (STANDING):  chlorhexidine 2% Cloths 1 Application(s) Topical <User Schedule>  docusate sodium 100 milliGRAM(s) Oral three times a day  finasteride 5 milliGRAM(s) Oral daily  metoprolol succinate ER 12.5 milliGRAM(s) Oral two times a day  nicotine -  14 mG/24Hr(s) Patch 1 patch Transdermal daily  ondansetron Injectable 8 milliGRAM(s) IV Push every 8 hours  prednisoLONE acetate 1% Suspension 2 Drop(s) Both EYES every 6 hours  senna 2 Tablet(s) Oral two times a day  sertraline 50 milliGRAM(s) Oral daily  sodium chloride 0.9%. 1000 milliLiter(s) (20 mL/Hr) IV Continuous <Continuous>  voriconazole 200 milliGRAM(s) Oral every 12 hours    MEDICATIONS  (PRN):  acetaminophen   Tablet .. 650 milliGRAM(s) Oral every 6 hours PRN Mild Pain (1 - 3)  aluminum hydroxide/magnesium hydroxide/simethicone Suspension 30 milliLiter(s) Oral every 6 hours PRN Dyspepsia  metoclopramide Injectable 10 milliGRAM(s) IV Push every 6 hours PRN nausea and or vomiting  oxyCODONE    IR 5 milliGRAM(s) Oral every 4 hours PRN Moderate Pain (4 - 6)  polyethylene glycol 3350 17 Gram(s) Oral two times a day PRN Constipation      Allergies    No Known Allergies    Intolerances        Vital Signs Last 24 Hrs  T(C): 35.6 (03 Aug 2019 11:45), Max: 36.7 (02 Aug 2019 17:20)  T(F): 96 (03 Aug 2019 11:45), Max: 98 (02 Aug 2019 17:20)  HR: 65 (03 Aug 2019 11:45) (64 - 80)  BP: 116/69 (03 Aug 2019 11:45) (97/49 - 116/69)  BP(mean): --  RR: 18 (03 Aug 2019 11:45) (18 - 18)  SpO2: 100% (03 Aug 2019 11:45) (95% - 100%)    I&O's Summary    02 Aug 2019 07:01  -  03 Aug 2019 07:00  --------------------------------------------------------  IN: 2249 mL / OUT: 1600 mL / NET: 649 mL    03 Aug 2019 07:01  -  03 Aug 2019 13:25  --------------------------------------------------------  IN: 360 mL / OUT: 950 mL / NET: -590 mL        ON EXAM:    Constitutional: NAD, awake and alert, well-developed  HEENT: Moist Mucous Membranes, Anicteric  Pulmonary: Decreased breath sounds b/l. No rales, crackles or wheeze appreciated.   Cardiovascular: Regular, S1 and S2, No murmurs, rubs, gallops or clicks  Gastrointestinal: Bowel Sounds present, soft, nontender.   Lymph: No peripheral edema. No lymphadenopathy.  Skin: No visible rashes or ulcers.  Psych:  Mood & affect appropriate for situation    LABS: All Labs Reviewed:                        8.2    1.8   )-----------( 37       ( 03 Aug 2019 07:08 )             24.6                         8.6    2.0   )-----------( 52       ( 02 Aug 2019 07:34 )             25.6                         8.2    2.6   )-----------( 56       ( 01 Aug 2019 06:49 )             25.2     03 Aug 2019 07:08    139    |  103    |  22     ----------------------------<  94     4.2     |  22     |  1.18   02 Aug 2019 07:34    139    |  103    |  22     ----------------------------<  99     4.4     |  24     |  1.29   01 Aug 2019 06:49    135    |  101    |  22     ----------------------------<  134    4.1     |  24     |  1.18     Ca    9.3        03 Aug 2019 07:08  Ca    9.7        02 Aug 2019 07:34  Ca    9.3        01 Aug 2019 06:49  Phos  4.4       03 Aug 2019 07:08  Phos  4.6       02 Aug 2019 07:34  Phos  4.2       01 Aug 2019 06:49  Mg     1.8       03 Aug 2019 07:08  Mg     1.9       02 Aug 2019 07:34  Mg     2.0       01 Aug 2019 06:49    TPro  5.8    /  Alb  3.6    /  TBili  0.3    /  DBili  x      /  AST  23     /  ALT  19     /  AlkPhos  61     03 Aug 2019 07:08  TPro  6.2    /  Alb  3.7    /  TBili  0.2    /  DBili  x      /  AST  23     /  ALT  19     /  AlkPhos  62     02 Aug 2019 07:34  TPro  5.9    /  Alb  3.5    /  TBili  0.2    /  DBili  x      /  AST  17     /  ALT  15     /  AlkPhos  59     01 Aug 2019 06:49          Blood Culture: Organism --  Gram Stain Blood -- Gram Stain --  Specimen Source .Urine  Culture-Blood --

## 2019-08-03 NOTE — PROGRESS NOTE ADULT - ATTENDING COMMENTS
60yo M with AML in CR admitted for right big toe amputation due to osteomyelitis, done 7/26.   Podiatry following -weight bear as tolerated in surgical shoe and keep dressing clean dry and intact   Resumed Xarelto for Factor V Leiden, will hold once plts <50.   Started HIDAC consolidation, today is day 4 of therapy.   Patient with mild fatigue but otherwise doing well.   Continue IVFs, Supportive care.  Pathology shows clean margins, no need for extended IV antibiotics, will d/c ceftriaxone and give levaquin/diflucan for neutropenia ppx on discharge.   Home with PICC care tomorrow. Patient is 60yo M with AML in CR admitted for right big toe amputation due to osteomyelitis, done 7/26.   Podiatry following -weight bear as tolerated in surgical shoe and keep dressing clean dry and intact   Resumed Xarelto for Factor V Leiden, will hold once plts <50.   Started HIDAC consolidation, today is day 6 of therapy.   Patient with mild fatigue but otherwise doing well.   Continue IVFs, Supportive care.  Pathology shows clean margins, no need for extended IV antibiotics, will d/c ceftriaxone and give Levaquin/diflucan for neutropenia prophylaxis on discharge.   Home with PICC care 8/3/19.

## 2019-08-03 NOTE — PROGRESS NOTE ADULT - REASON FOR ADMISSION
Amputation of right great toe and Cycle 1 of HiDAc

## 2019-08-03 NOTE — PROGRESS NOTE ADULT - PROBLEM SELECTOR PLAN 3
Status post Right great toe amputation 7/26  abx discontinued today as per ID   Podiatry following 7/26 Status post Right great toe amputation  8/2 Ceftriaxone discontinued per ID for clan margins  Podiatry following

## 2019-08-03 NOTE — PROGRESS NOTE ADULT - PROBLEM SELECTOR PLAN 5
With previous DVT and PE,  dx many years ago   Xarelto was on previously on hold for toe amputation, restarted on 7/27  Hold when PLT <50K With previous DVT and PE, dx many years ago   Xarelto held starting today for PLT <50K

## 2019-08-03 NOTE — PROGRESS NOTE ADULT - ASSESSMENT
This is a 60yo M with PMHx of HFrEF (recent EF 28%), COLLEEN, DVT/PE, HTN, Factor V Leiden and AML diagnosed in 4/2019 status post induction chemotherapy now in morphologic remission admitted for amputation of right great toe due(done on 7/26) due to osteomyelitis.  Consolidation chemotherapy with cycle 1 HiDAc. Pt has anemia and thrombocytopenia due to chemo and or disease. This is a 58yo M with PMHx of HFrEF (recent EF 28%), COLLEEN, DVT/PE, HTN, Factor V Leiden and AML diagnosed in 4/2019 status post induction chemotherapy now in morphologic remission admitted for amputation of right great toe due(done on 7/26) due to osteomyelitis. Patient During the hospital stay Pt’s CBCs chemistries were monitored very closely and supplemented as needed. Vitals signs were monitored every 4 hrs and strict I/O was maintained.ow receiving consolidation chemotherapy with cycle 1 HiDAc. Pt has anemia and thrombocytopenia due to chemo and or disease.

## 2019-08-03 NOTE — PROGRESS NOTE ADULT - SUBJECTIVE AND OBJECTIVE BOX
Diagnosis: AML    Protocol/Chemo Regimen:  Cycle 1 of HiDAc    Day: 6    Pt endorsed: no complaints    Review of Systems: Denies any chest pain, palpitation, SOB, abdominal pain, nausea, vomiting or diarrhea.    Pain scale: Denies    Diet: Regular    Allergies: No Known Allergies    ANTIMICROBIALS  cefTRIAXone   IVPB 2000 milliGRAM(s) IV Intermittent every 24 hours  voriconazole 200 milliGRAM(s) Oral every 12 hours      HEME/ONC MEDICATIONS  rivaroxaban 20 milliGRAM(s) Oral with dinner      STANDING MEDICATIONS  chlorhexidine 2% Cloths 1 Application(s) Topical <User Schedule>  docusate sodium 100 milliGRAM(s) Oral three times a day  finasteride 5 milliGRAM(s) Oral daily  metoprolol succinate ER 12.5 milliGRAM(s) Oral two times a day  nicotine -  14 mG/24Hr(s) Patch 1 patch Transdermal daily  ondansetron Injectable 8 milliGRAM(s) IV Push every 8 hours  prednisoLONE acetate 1% Suspension 2 Drop(s) Both EYES every 6 hours  senna 2 Tablet(s) Oral two times a day  sertraline 50 milliGRAM(s) Oral daily  sodium chloride 0.9%. 1000 milliLiter(s) IV Continuous <Continuous>      PRN MEDICATIONS  acetaminophen   Tablet .. 650 milliGRAM(s) Oral every 6 hours PRN  aluminum hydroxide/magnesium hydroxide/simethicone Suspension 30 milliLiter(s) Oral every 6 hours PRN  metoclopramide Injectable 10 milliGRAM(s) IV Push every 6 hours PRN  oxyCODONE    IR 5 milliGRAM(s) Oral every 4 hours PRN  polyethylene glycol 3350 17 Gram(s) Oral two times a day PRN      Vital Signs Last 24 Hrs  T(C): 35.8 (03 Aug 2019 04:58), Max: 36.7 (02 Aug 2019 17:20)  T(F): 96.5 (03 Aug 2019 04:58), Max: 98 (02 Aug 2019 17:20)  HR: 73 (03 Aug 2019 04:58) (67 - 80)  BP: 97/49 (03 Aug 2019 04:58) (97/49 - 109/69)  BP(mean): --  RR: 18 (03 Aug 2019 04:58) (18 - 18)  SpO2: 97% (03 Aug 2019 04:58) (96% - 98%)      PHYSICAL EXAM  General: NAD  HEENT: PERRLA, EOMI, clear oropharynx, anicteric sclera, pink conjunctiva  Neck: supple  CV: (+) S1/S2 RRR  Lungs: clear to auscultation, no wheezes or rales  Abdomen: soft, non-tender, non-distended (+) BS  Ext: no clubbing, cyanosis or edema  Skin: no rashes and no petechiae  Neuro: alert and oriented X 3, no focal deficits  Central Line: C/D/I        LABS: Diagnosis: AML    Protocol/Chemo Regimen:  Cycle 1 of HiDAc    Day: 6    Pt endorsed: no complaints    Review of Systems: Denies any chest pain, palpitation, SOB, abdominal pain, nausea, vomiting or diarrhea.    Pain scale: Denies    Diet: Regular    Allergies: No Known Allergies    ANTIMICROBIALS  cefTRIAXone   IVPB 2000 milliGRAM(s) IV Intermittent every 24 hours  voriconazole 200 milliGRAM(s) Oral every 12 hours      STANDING MEDICATIONS  chlorhexidine 2% Cloths 1 Application(s) Topical <User Schedule>  docusate sodium 100 milliGRAM(s) Oral three times a day  finasteride 5 milliGRAM(s) Oral daily  metoprolol succinate ER 12.5 milliGRAM(s) Oral two times a day  nicotine -  14 mG/24Hr(s) Patch 1 patch Transdermal daily  ondansetron Injectable 8 milliGRAM(s) IV Push every 8 hours  prednisoLONE acetate 1% Suspension 2 Drop(s) Both EYES every 6 hours  senna 2 Tablet(s) Oral two times a day  sertraline 50 milliGRAM(s) Oral daily  sodium chloride 0.9%. 1000 milliLiter(s) IV Continuous <Continuous>      PRN MEDICATIONS  acetaminophen   Tablet .. 650 milliGRAM(s) Oral every 6 hours PRN  aluminum hydroxide/magnesium hydroxide/simethicone Suspension 30 milliLiter(s) Oral every 6 hours PRN  metoclopramide Injectable 10 milliGRAM(s) IV Push every 6 hours PRN  oxyCODONE    IR 5 milliGRAM(s) Oral every 4 hours PRN  polyethylene glycol 3350 17 Gram(s) Oral two times a day PRN      Vital Signs Last 24 Hrs  T(C): 35.8 (03 Aug 2019 04:58), Max: 36.7 (02 Aug 2019 17:20)  T(F): 96.5 (03 Aug 2019 04:58), Max: 98 (02 Aug 2019 17:20)  HR: 73 (03 Aug 2019 04:58) (67 - 80)  BP: 97/49 (03 Aug 2019 04:58) (97/49 - 109/69)  BP(mean): --  RR: 18 (03 Aug 2019 04:58) (18 - 18)  SpO2: 97% (03 Aug 2019 04:58) (96% - 98%)      PHYSICAL EXAM  General: NAD  HEENT: PERRLA, EOMI, clear oropharynx, anicteric sclera, pink conjunctiva  Neck: supple  CV: (+) S1/S2 RRR  Lungs: clear to auscultation, no wheezes or rales  Abdomen: soft, non-tender, non-distended (+) BS  Ext: no clubbing, cyanosis or edema  Skin: no rashes and no petechiae  Neuro: alert and oriented X 3, no focal deficits  Central Line: C/D/I        LABS: Diagnosis: AML    Protocol/Chemo Regimen:  Cycle 1 of HiDAc    Day: 6    Pt endorsed: no complaints    Review of Systems: Denies any chest pain, palpitation, SOB, abdominal pain, nausea, vomiting or diarrhea.    Pain scale: Denies    Diet: Regular    Allergies: No Known Allergies    ANTIMICROBIALS  cefTRIAXone   IVPB 2000 milliGRAM(s) IV Intermittent every 24 hours  voriconazole 200 milliGRAM(s) Oral every 12 hours      STANDING MEDICATIONS  chlorhexidine 2% Cloths 1 Application(s) Topical <User Schedule>  docusate sodium 100 milliGRAM(s) Oral three times a day  finasteride 5 milliGRAM(s) Oral daily  metoprolol succinate ER 12.5 milliGRAM(s) Oral two times a day  nicotine -  14 mG/24Hr(s) Patch 1 patch Transdermal daily  ondansetron Injectable 8 milliGRAM(s) IV Push every 8 hours  prednisoLONE acetate 1% Suspension 2 Drop(s) Both EYES every 6 hours  senna 2 Tablet(s) Oral two times a day  sertraline 50 milliGRAM(s) Oral daily  sodium chloride 0.9%. 1000 milliLiter(s) IV Continuous <Continuous>      PRN MEDICATIONS  acetaminophen   Tablet .. 650 milliGRAM(s) Oral every 6 hours PRN  aluminum hydroxide/magnesium hydroxide/simethicone Suspension 30 milliLiter(s) Oral every 6 hours PRN  metoclopramide Injectable 10 milliGRAM(s) IV Push every 6 hours PRN  oxyCODONE    IR 5 milliGRAM(s) Oral every 4 hours PRN  polyethylene glycol 3350 17 Gram(s) Oral two times a day PRN      Vital Signs Last 24 Hrs  T(C): 35.8 (03 Aug 2019 04:58), Max: 36.7 (02 Aug 2019 17:20)  T(F): 96.5 (03 Aug 2019 04:58), Max: 98 (02 Aug 2019 17:20)  HR: 73 (03 Aug 2019 04:58) (67 - 80)  BP: 97/49 (03 Aug 2019 04:58) (97/49 - 109/69)  BP(mean): --  RR: 18 (03 Aug 2019 04:58) (18 - 18)  SpO2: 97% (03 Aug 2019 04:58) (96% - 98%)      PHYSICAL EXAM  General: NAD  HEENT: PERRLA, EOMI, clear oropharynx, anicteric sclera, pink conjunctiva  Neck: supple  CV: (+) S1/S2 RRR  Lungs: clear to auscultation, no wheezes or rales  Abdomen: soft, non-tender, non-distended (+) BS  Ext: no clubbing, cyanosis or edema  Skin: no rashes and no petechiae  Neuro: alert and oriented X 3, no focal deficits  Central Line: C/D/I        LABS:                        8.2    1.8   )-----------( 37       ( 03 Aug 2019 07:08 )             24.6         Mean Cell Volume : 98.7 fl  Mean Cell Hemoglobin : 32.9 pg  Mean Cell Hemoglobin Concentration : 33.3 gm/dL  Auto Neutrophil # : 1.4 K/uL  Auto Lymphocyte # : 0.3 K/uL  Auto Monocyte # : 0.0 K/uL  Auto Eosinophil # : 0.1 K/uL  Auto Basophil # : 0.0 K/uL  Auto Neutrophil % : 79.9 %  Auto Lymphocyte % : 14.9 %  Auto Monocyte % : 1.3 %  Auto Eosinophil % : 2.9 %  Auto Basophil % : 1.0 %      08-03    139  |  103  |  22  ----------------------------<  94  4.2   |  22  |  1.18    Ca    9.3      03 Aug 2019 07:08  Phos  4.4     08-03  Mg     1.8     08-03    TPro  5.8<L>  /  Alb  3.6  /  TBili  0.3  /  DBili  x   /  AST  23  /  ALT  19  /  AlkPhos  61  08-03      Mg 1.8  Phos 4.4

## 2019-08-03 NOTE — PROGRESS NOTE ADULT - PROBLEM SELECTOR PLAN 1
Started Cycle 1 consolidation HiDAc   Monitor CBC/Lytes and transfuse replete PRN  Keep K>4 and Mg>2  Strict Is and Os/Daily weights/Mouth Care  Monitor for cerebellar toxicity  Pred forte eye drops  IVF  Antiemetics.  Plan for discharge tomorrow 8/3 Started Cycle 1 consolidation HiDAc   Monitor CBC/Lytes and transfuse replete PRN  Thrombocytopenia: 1 bag PLT x 1 for discharge. Patient developed single small hive to thigh after transfusion and was given Benadryl 25 mg PO x1. Patient denied and other s/s including respiratory distress. Patient monitored for 2 hours and then discharged.   Keep K>4 and Mg>2  Strict Is and Os/Daily weights/Mouth Care  Monitor for cerebellar toxicity  Pred forte eye drops  IVF  Antiemetics

## 2019-08-03 NOTE — PROGRESS NOTE ADULT - ASSESSMENT
Mr. Delong is a 59 year old male with AML previously on anthracycline based chemotherapy with a reported EF in the 20's at this time, who was recently admitted for chemotherapy and a toe infection, now returns for amputation of right toe due to osteomyelitis followed by consolidation chemotherapy with cycle 1 HiDAc.    Recently, he had an echocardiogram in our office 7/2019 with mild MR, eccentric LVH with moderate LV dysfunction (EF 35-40%), and mild aortic root dilatation. MUGA with Dilated left ventricle with generalized hypokinesia and EF 28 %, slightly lower than the echocardiogram.  He also has a history of Factor V Leiden and VTE, and has been on Xarelto.    - No sign of acute ischemia, nor does he have any chest pain or difficulty breathing. His last EKG demonstrates a RBBB, LAD, RVH and non-specific st abn.  He reports a cath about 5 years ago at [facility] that was unremarkable and a stress test more recently, though results of this are unavailable.  -  He seems to be tolerating Toprol XL 12.5 bid which can be continued.  - Because of mild NAGA, we held off on ace-inhibitor, especially in the setting of borderline BP. Ideally, we would start him on lisinopril 2.5 mg po daily.  This does not need to be started now given his soft BP  - hold xarelto in setting of thrombocytopenia  - Watch creatinine and electrolytes. Keep K>4, Mg>2  - Tolerated toe amputation without cardiac complications.  - Cont chemo. Monitor for signs of vol ol. Euvolemic today  - Will follow with you

## 2019-08-03 NOTE — PROGRESS NOTE ADULT - PROVIDER SPECIALTY LIST ADULT
Cardiology
Heme/Onc
Infectious Disease
Podiatry
Podiatry
Cardiology
Podiatry
Infectious Disease
Heme/Onc

## 2019-08-03 NOTE — ADVANCED PRACTICE NURSE CONSULT - ASSESSMENT
Patient alert and oriented, denies any discomfort at this time. Patient verbalized understanding of chemotherapy treatment. Teaching reinforced. Drug verification done by two RN.'s Patient has DL PICC in the the right upper arm. Site is WNL, with positive blood return noted and flushing easily with 10 ml normal saline. Dressing dry and intact. Nystagmus check done with negative result. At 0132 Day 5/5 Cytarabine 3gm/o9=1980 mg IV started and infusing well over 3 hours to PICC via alaris pump. Patient tolerating well. Will continue tomorrow.

## 2019-08-05 ENCOUNTER — OUTPATIENT (OUTPATIENT)
Dept: OUTPATIENT SERVICES | Facility: HOSPITAL | Age: 59
LOS: 1 days | End: 2019-08-05
Payer: COMMERCIAL

## 2019-08-05 ENCOUNTER — MEDICATION RENEWAL (OUTPATIENT)
Age: 59
End: 2019-08-05

## 2019-08-05 ENCOUNTER — OUTPATIENT (OUTPATIENT)
Dept: OUTPATIENT SERVICES | Facility: HOSPITAL | Age: 59
LOS: 1 days | Discharge: HOME | End: 2019-08-05

## 2019-08-05 ENCOUNTER — RESULT REVIEW (OUTPATIENT)
Age: 59
End: 2019-08-05

## 2019-08-05 ENCOUNTER — APPOINTMENT (OUTPATIENT)
Dept: INFUSION THERAPY | Facility: HOSPITAL | Age: 59
End: 2019-08-05

## 2019-08-05 DIAGNOSIS — C92.00 ACUTE MYELOBLASTIC LEUKEMIA, NOT HAVING ACHIEVED REMISSION: ICD-10-CM

## 2019-08-05 LAB
EOSINOPHIL # BLD AUTO: 0.1 K/UL — SIGNIFICANT CHANGE UP (ref 0–0.5)
HCT VFR BLD CALC: 24.7 % — LOW (ref 39–50)
HGB BLD-MCNC: 8.2 G/DL — LOW (ref 13–17)
LYMPHOCYTES # BLD AUTO: 0.3 K/UL — LOW (ref 1–3.3)
LYMPHOCYTES # BLD AUTO: 46 % — HIGH (ref 13–44)
MCHC RBC-ENTMCNC: 32.7 PG — SIGNIFICANT CHANGE UP (ref 27–34)
MCHC RBC-ENTMCNC: 33.4 G/DL — SIGNIFICANT CHANGE UP (ref 32–36)
MCV RBC AUTO: 98 FL — SIGNIFICANT CHANGE UP (ref 80–100)
MONOCYTES # BLD AUTO: 0 K/UL — SIGNIFICANT CHANGE UP (ref 0–0.9)
MONOCYTES NFR BLD AUTO: 5 % — SIGNIFICANT CHANGE UP (ref 2–14)
NEUTROPHILS # BLD AUTO: 0.3 K/UL — LOW (ref 1.8–7.4)
NEUTROPHILS NFR BLD AUTO: 49 % — SIGNIFICANT CHANGE UP (ref 43–77)
PLAT MORPH BLD: NORMAL — SIGNIFICANT CHANGE UP
PLATELET # BLD AUTO: 32 K/UL — LOW (ref 150–400)
RBC # BLD: 2.52 M/UL — LOW (ref 4.2–5.8)
RBC # FLD: 14.7 % — HIGH (ref 10.3–14.5)
RBC BLD AUTO: SIGNIFICANT CHANGE UP
WBC # BLD: 0.7 K/UL — CRITICAL LOW (ref 3.8–10.5)
WBC # FLD AUTO: 0.7 K/UL — CRITICAL LOW (ref 3.8–10.5)

## 2019-08-07 ENCOUNTER — APPOINTMENT (OUTPATIENT)
Dept: INFUSION THERAPY | Facility: HOSPITAL | Age: 59
End: 2019-08-07

## 2019-08-07 ENCOUNTER — RESULT REVIEW (OUTPATIENT)
Age: 59
End: 2019-08-07

## 2019-08-07 ENCOUNTER — APPOINTMENT (OUTPATIENT)
Dept: HEMATOLOGY ONCOLOGY | Facility: CLINIC | Age: 59
End: 2019-08-07
Payer: COMMERCIAL

## 2019-08-07 VITALS
DIASTOLIC BLOOD PRESSURE: 79 MMHG | HEART RATE: 88 BPM | TEMPERATURE: 97.7 F | OXYGEN SATURATION: 95 % | SYSTOLIC BLOOD PRESSURE: 144 MMHG | RESPIRATION RATE: 16 BRPM

## 2019-08-07 LAB
BLD GP AB SCN SERPL QL: NEGATIVE — SIGNIFICANT CHANGE UP
HCT VFR BLD CALC: 23.6 % — LOW (ref 39–50)
HGB BLD-MCNC: 8.2 G/DL — LOW (ref 13–17)
MCHC RBC-ENTMCNC: 34 PG — SIGNIFICANT CHANGE UP (ref 27–34)
MCHC RBC-ENTMCNC: 34.9 G/DL — SIGNIFICANT CHANGE UP (ref 32–36)
MCV RBC AUTO: 97.4 FL — SIGNIFICANT CHANGE UP (ref 80–100)
PLATELET # BLD AUTO: 14 K/UL — CRITICAL LOW (ref 150–400)
RBC # BLD: 2.42 M/UL — LOW (ref 4.2–5.8)
RBC # FLD: 14.2 % — SIGNIFICANT CHANGE UP (ref 10.3–14.5)
RH IG SCN BLD-IMP: POSITIVE — SIGNIFICANT CHANGE UP
WBC # BLD: 0.2 K/UL — CRITICAL LOW (ref 3.8–10.5)
WBC # FLD AUTO: 0.2 K/UL — CRITICAL LOW (ref 3.8–10.5)

## 2019-08-07 PROCEDURE — 99214 OFFICE O/P EST MOD 30 MIN: CPT

## 2019-08-08 DIAGNOSIS — Z51.89 ENCOUNTER FOR OTHER SPECIFIED AFTERCARE: ICD-10-CM

## 2019-08-09 ENCOUNTER — RESULT REVIEW (OUTPATIENT)
Age: 59
End: 2019-08-09

## 2019-08-09 ENCOUNTER — OTHER (OUTPATIENT)
Age: 59
End: 2019-08-09

## 2019-08-09 ENCOUNTER — APPOINTMENT (OUTPATIENT)
Dept: INFUSION THERAPY | Facility: HOSPITAL | Age: 59
End: 2019-08-09

## 2019-08-09 DIAGNOSIS — Z51.11 ENCOUNTER FOR ANTINEOPLASTIC CHEMOTHERAPY: ICD-10-CM

## 2019-08-09 DIAGNOSIS — R11.2 NAUSEA WITH VOMITING, UNSPECIFIED: ICD-10-CM

## 2019-08-09 LAB
BLD GP AB SCN SERPL QL: NEGATIVE — SIGNIFICANT CHANGE UP
HCT VFR BLD CALC: 23.2 % — LOW (ref 39–50)
HGB BLD-MCNC: 7.9 G/DL — LOW (ref 13–17)
MCHC RBC-ENTMCNC: 32.9 PG — SIGNIFICANT CHANGE UP (ref 27–34)
MCHC RBC-ENTMCNC: 34 G/DL — SIGNIFICANT CHANGE UP (ref 32–36)
MCV RBC AUTO: 96.8 FL — SIGNIFICANT CHANGE UP (ref 80–100)
PLATELET # BLD AUTO: 31 K/UL — LOW (ref 150–400)
RBC # BLD: 2.4 M/UL — LOW (ref 4.2–5.8)
RBC # FLD: 14.4 % — SIGNIFICANT CHANGE UP (ref 10.3–14.5)
RH IG SCN BLD-IMP: POSITIVE — SIGNIFICANT CHANGE UP
WBC # BLD: 0.5 K/UL — CRITICAL LOW (ref 3.8–10.5)
WBC # FLD AUTO: 0.5 K/UL — CRITICAL LOW (ref 3.8–10.5)

## 2019-08-10 ENCOUNTER — APPOINTMENT (OUTPATIENT)
Dept: INFUSION THERAPY | Facility: HOSPITAL | Age: 59
End: 2019-08-10

## 2019-08-12 ENCOUNTER — RESULT REVIEW (OUTPATIENT)
Age: 59
End: 2019-08-12

## 2019-08-12 ENCOUNTER — OUTPATIENT (OUTPATIENT)
Dept: OUTPATIENT SERVICES | Facility: HOSPITAL | Age: 59
LOS: 1 days | Discharge: HOME | End: 2019-08-12
Payer: COMMERCIAL

## 2019-08-12 ENCOUNTER — APPOINTMENT (OUTPATIENT)
Dept: INFUSION THERAPY | Facility: HOSPITAL | Age: 59
End: 2019-08-12

## 2019-08-12 LAB
HCT VFR BLD CALC: 20.9 % — CRITICAL LOW (ref 39–50)
HGB BLD-MCNC: 7.1 G/DL — LOW (ref 13–17)
MCHC RBC-ENTMCNC: 33 PG — SIGNIFICANT CHANGE UP (ref 27–34)
MCHC RBC-ENTMCNC: 34.1 G/DL — SIGNIFICANT CHANGE UP (ref 32–36)
MCV RBC AUTO: 96.8 FL — SIGNIFICANT CHANGE UP (ref 80–100)
PLATELET # BLD AUTO: 9 K/UL — CRITICAL LOW (ref 150–400)
RBC # BLD: 2.16 M/UL — LOW (ref 4.2–5.8)
RBC # FLD: 14.3 % — SIGNIFICANT CHANGE UP (ref 10.3–14.5)
WBC # BLD: 0.4 K/UL — CRITICAL LOW (ref 3.8–10.5)
WBC # FLD AUTO: 0.4 K/UL — CRITICAL LOW (ref 3.8–10.5)

## 2019-08-12 NOTE — REVIEW OF SYSTEMS
[Skin Wound] : skin wound [Negative] : Allergic/Immunologic [FreeTextEntry8] : . [de-identified] : toe infection right great toe

## 2019-08-12 NOTE — HISTORY OF PRESENT ILLNESS
[de-identified] : Mr. Delong is a 60 y/o male, with a previously diagnosed acute myeloid leukemia. A bone marrow biopsy from 7/3 revealed Acute myeloid leukemia (AML). FISH - report shows a population of aberrant myeloid blasts. He is currently being treated by Dr. Coelho for high risk AML with HIDAC consolidation chemotherapy. He was referred by Dr. Coelho for an initial consultation of a Haplo- transplant. \par \par The patient has a history of multiple blood clots - factor V Leiden runs in the family. He was diagnosed with sleep apnea, but refuses to use CPAP. He is a former smoker, 40 years. He also has a past medical history of PE, HTN, cardiomyopathy. He recently underwent an amputation of the right first toe due to an infection. [de-identified] : The patient presents for an initial consultation of a Haplo- transplant. He is presently being treated for AML under the care of Dr. Coelho with HIDAC consolidation chemotherapy. He recently underwent an amputation of the right first toe and is recovering well, not using a walker. Denies mouth sores, eye dryness, blurred vision, nausea, vomiting, diarrhea. No CP, SOB or LE edema......The patient has potential donors, both related as well as unrelated. Today CBC shows a low Hgb of 8.2 and is currently requiring infusions.\par Donors: \par     The patient 1/2 matched with all four of his children;Two daughters and two sons.\par     The patient 1/2 matched with his 49 y/o sister.\par     The patient 1/2 matched with unrelated donors on the international registry.

## 2019-08-12 NOTE — ASSESSMENT
[FreeTextEntry1] : 58 y/o male with high risk AML. He is being seen by Dr. Coelho for tx with HIDAC consolidation.\par Consultation of a haplo transplant was discussed with the pt and his significant other, over the phone. \par \par The patients has multiple potential donors including his four children who are all 1/2 matches; two daughters and two sons. \par The patient's sister, who is 49 y/o is also a potential donor being that she is 1/2 match. \par He also is a 1/2 match for several donors listed on the international registry. \par It was explained to the patient that a male donor would work best in his favor and that all donors would be evaluated if the procedure continues. An antibody test will be sent to help further evaluate the donors that would work best in his favor. \par \par I have reviewed with the patient the rationale, risks and benefits of treatment with chemotherapy alone vs.chemotherapy plus haplo-transplant. \par \par I have also reviewed the pre-transplant testing for vital organ testing including, but not limited to ECHO, MUGA, PFTs, Urine analysis, BM Bx, LFTs, sinus -xrays and Dental evaluation, .\par \par I discussed with the patient, the role of a donor. I have reviewed with the patient the donor screening process including a physical exam, HLA typing and antibody testing, and the stem cell collection process. I discussed the role of Neupogen for 5 days prior to obtaining the specimen. Side effects including but not limited to fever, body aches from Neupogen injections was discussed. \par \par I have also discussed the process of apheresis as a way to collect the peripheral stem cells. Anticoagulation with Citrate during apheresis was discussed, along with side effects including but not limited to hypocalcemia mild dysesthesias (most common) to tetany, seizures and cardiac arrhythmias. Treatment with no oral or intravenous calcium supplementation in the setting of hypocalcemia was also discussed. \par \par I have discussed with the patient the pre-administration of Kepivance IV x 5 days, as a GI prophylaxis with the aim to prevent swelling, irritation, sores, and ulcers in the GI tract caused by high dose chemotherapy. Side effects including but not limited to flushing, itching from Kepivance were also discussed. \par \par I have discussed with the patient the four week hospital stay for the transplant. I have reviewed with the patient the initial chemotherapy with Fludarabine and Cytoxan as well as a single dose of radiation (TBI at 200cG) in the pre-transplant state. The patient will receive the stem cells by a triple lumen catheter following the radiation. I have discussed the role of high dose Cytoxan following the transplant on day 3 and day 4. I have discussed the role of CellCept x 30 days and Tacrolimus x 180 days in the post-transplant state as immunosuppressants. I have discussed the role of supportive care in the weeks following the transplant with the possibility of needing PRBCs, platelets, fluids, and/or electrolytes. \par \par I discussed with the patient, post-transplant precautions including the use of antiviral, antifungal, antibiotics, and immunosuppressant. I discussed with the patient post-transplant complications including but not limited to bleeding, infection, end-organ damage, mucositis, veno-occlusive disease, graft failure, graft rejection, progression of disease as well as GVHD (both acute and chronic). Possible symptoms of GVHD including, but not limited to, rash, diarrhea, nausea, vomiting, mouth sores, liver inflammation were discussed. I informed the patient of post-discharge expectations as well, including fatigue and “flu-like” symptoms. Post-discharge precautions including diet and crowd control discussed as well. I reviewed with the patient the risks and benefits of this procedure. I reviewed the timing of the transplant with the patient. \par \par I informed the patient that there is a 15% risk of a catastrophic event to occur, including but not limited to relapse, graft rejection, severe infection and death is during the first year post-transplant. Also, discussed with the patient that the risk of relapse decreases with each additional year post-transplant, and that at the 5 year manju cure may be achieved. \par \par Literature and consents for Haplo-transplant were provided to the patient for review. Patient to consider option. All questions were addressed and emotional support provided.\par \par -Patient was advised to f/u with cardiology.\par -Plans to see Dr. Coelho on 8/19/19\par -RTC in 3 weeks for an orientation if pre testing goes well.\par \par Patient has been advised to contact clinic if he has any concerns.

## 2019-08-12 NOTE — ADDENDUM
[FreeTextEntry1] : Documented by Alison Lloyd acting as a scribe for Dr. Gaytan on 08/07/2019.\par \par All medical record entries made by a scribe were at my, Dr. Gaytan direction and personally dictated by me on 08/07/2019. I have reviewed the chart and agree that the record accurately reflects my personal performance of the history, physical exam, procedure and imaging.\par

## 2019-08-13 ENCOUNTER — APPOINTMENT (OUTPATIENT)
Dept: RADIATION ONCOLOGY | Facility: CLINIC | Age: 59
End: 2019-08-13
Payer: COMMERCIAL

## 2019-08-13 VITALS
WEIGHT: 182.32 LBS | OXYGEN SATURATION: 100 % | HEIGHT: 72 IN | SYSTOLIC BLOOD PRESSURE: 111 MMHG | TEMPERATURE: 98.24 F | BODY MASS INDEX: 24.69 KG/M2 | RESPIRATION RATE: 16 BRPM | DIASTOLIC BLOOD PRESSURE: 71 MMHG | HEART RATE: 81 BPM

## 2019-08-13 PROCEDURE — 99202 OFFICE O/P NEW SF 15 MIN: CPT | Mod: 25

## 2019-08-13 PROCEDURE — 77261 THER RADIOLOGY TX PLNG SMPL: CPT

## 2019-08-13 NOTE — HISTORY OF PRESENT ILLNESS
[FreeTextEntry1] : Young Delong is a 59 year old male former smoker, PE, multiple blood clots with AML here today to discuss TBI. \par \par He has history of blood clots, Factor V Leiden runs in the family treated with Coumadin, Xarelto. Follow up with his hematology in February was stable. However, in April 2019, routine blood work found he had low platelets and was sent to ED and was diagnosed with AML He was treated at [place] and received induction chemotherapy on 5/2/19. However, he had wound complications with treatment including toe infection, fever, fungal infection. Recent BM bx 7/3 revealed AML. He was scheduled to for HIDAC consolidation chemo from 7/11-7/16 at [facility] but due to his toe infection, he was discharged and is now s/p right toe amputation. Final path found extensive osteomyelitis.  He is continuing with his treatment for AML with haplo transplant planned.\par \par Date of implant is unknown, son will be donor.

## 2019-08-13 NOTE — REVIEW OF SYSTEMS
[Recent Change In Weight] : ~T recent weight change [Nocturia] : nocturia [Urinary Frequency] : urinary frequency [Easy Bruising] : a tendency for easy bruising [Negative] : Endocrine

## 2019-08-13 NOTE — PHYSICAL EXAM
[General Appearance - In No Acute Distress] : in no acute distress [] : no respiratory distress [de-identified] :  amputation  of  lrft toe

## 2019-08-13 NOTE — DISEASE MANAGEMENT
[Pathological] : TNM Stage: p [N/A] : Currently not applicable [FreeTextEntry4] : AML [NTNM] : x [TTNM] : xx [MTNM] : x

## 2019-08-14 ENCOUNTER — RESULT REVIEW (OUTPATIENT)
Age: 59
End: 2019-08-14

## 2019-08-14 ENCOUNTER — APPOINTMENT (OUTPATIENT)
Dept: INFUSION THERAPY | Facility: HOSPITAL | Age: 59
End: 2019-08-14

## 2019-08-14 LAB
BASOPHILS # BLD AUTO: 0 K/UL — SIGNIFICANT CHANGE UP (ref 0–0.2)
BASOPHILS NFR BLD AUTO: 0 % — SIGNIFICANT CHANGE UP (ref 0–2)
EOSINOPHIL # BLD AUTO: 0 K/UL — SIGNIFICANT CHANGE UP (ref 0–0.5)
EOSINOPHIL NFR BLD AUTO: 1 % — SIGNIFICANT CHANGE UP (ref 0–6)
HCT VFR BLD CALC: 25 % — LOW (ref 39–50)
HGB BLD-MCNC: 9.1 G/DL — LOW (ref 13–17)
LYMPHOCYTES # BLD AUTO: 0.6 K/UL — LOW (ref 1–3.3)
LYMPHOCYTES # BLD AUTO: 95 % — HIGH (ref 13–44)
MACROCYTES BLD QL: SLIGHT — SIGNIFICANT CHANGE UP
MCHC RBC-ENTMCNC: 35 PG — HIGH (ref 27–34)
MCHC RBC-ENTMCNC: 36.5 G/DL — HIGH (ref 32–36)
MCV RBC AUTO: 95.9 FL — SIGNIFICANT CHANGE UP (ref 80–100)
MONOCYTES # BLD AUTO: 0 K/UL — SIGNIFICANT CHANGE UP (ref 0–0.9)
MONOCYTES NFR BLD AUTO: 3 % — SIGNIFICANT CHANGE UP (ref 2–14)
NEUTROPHILS # BLD AUTO: 0 K/UL — LOW (ref 1.8–7.4)
NEUTROPHILS NFR BLD AUTO: 1 % — LOW (ref 43–77)
OVALOCYTES BLD QL SMEAR: SLIGHT — SIGNIFICANT CHANGE UP
PLAT MORPH BLD: NORMAL — SIGNIFICANT CHANGE UP
PLATELET # BLD AUTO: 24 K/UL — LOW (ref 150–400)
RBC # BLD: 2.61 M/UL — LOW (ref 4.2–5.8)
RBC # FLD: 14.1 % — SIGNIFICANT CHANGE UP (ref 10.3–14.5)
RBC BLD AUTO: SIGNIFICANT CHANGE UP
TARGETS BLD QL SMEAR: SLIGHT — SIGNIFICANT CHANGE UP
WBC # BLD: 0.6 K/UL — CRITICAL LOW (ref 3.8–10.5)
WBC # FLD AUTO: 0.6 K/UL — CRITICAL LOW (ref 3.8–10.5)

## 2019-08-16 ENCOUNTER — APPOINTMENT (OUTPATIENT)
Dept: INFUSION THERAPY | Facility: HOSPITAL | Age: 59
End: 2019-08-16

## 2019-08-16 ENCOUNTER — RESULT REVIEW (OUTPATIENT)
Age: 59
End: 2019-08-16

## 2019-08-16 LAB
EOSINOPHIL NFR BLD AUTO: 1 % — SIGNIFICANT CHANGE UP (ref 0–6)
HCT VFR BLD CALC: 25.5 % — LOW (ref 39–50)
HGB BLD-MCNC: 8.9 G/DL — LOW (ref 13–17)
LYMPHOCYTES # BLD AUTO: 92 % — HIGH (ref 13–44)
LYMPHOCYTES # BLD AUTO: LOW K/UL (ref 1–3.3)
MCHC RBC-ENTMCNC: 33.6 PG — SIGNIFICANT CHANGE UP (ref 27–34)
MCHC RBC-ENTMCNC: 34.9 G/DL — SIGNIFICANT CHANGE UP (ref 32–36)
MCV RBC AUTO: 96.3 FL — SIGNIFICANT CHANGE UP (ref 80–100)
MONOCYTES NFR BLD AUTO: 4 % — SIGNIFICANT CHANGE UP (ref 2–14)
NEUTROPHILS # BLD AUTO: 0 K/UL — LOW (ref 1.8–7.4)
NEUTROPHILS NFR BLD AUTO: 3 % — LOW (ref 43–77)
PLAT MORPH BLD: NORMAL — SIGNIFICANT CHANGE UP
PLATELET # BLD AUTO: 15 K/UL — CRITICAL LOW (ref 150–400)
RBC # BLD: 2.65 M/UL — LOW (ref 4.2–5.8)
RBC # FLD: 14.8 % — HIGH (ref 10.3–14.5)
RBC BLD AUTO: SIGNIFICANT CHANGE UP
WBC # BLD: 0.8 K/UL — CRITICAL LOW (ref 3.8–10.5)
WBC # FLD AUTO: 0.8 K/UL — CRITICAL LOW (ref 3.8–10.5)

## 2019-08-19 ENCOUNTER — APPOINTMENT (OUTPATIENT)
Dept: INFUSION THERAPY | Facility: HOSPITAL | Age: 59
End: 2019-08-19

## 2019-08-19 ENCOUNTER — RESULT REVIEW (OUTPATIENT)
Age: 59
End: 2019-08-19

## 2019-08-19 ENCOUNTER — APPOINTMENT (OUTPATIENT)
Dept: HEMATOLOGY ONCOLOGY | Facility: CLINIC | Age: 59
End: 2019-08-19
Payer: COMMERCIAL

## 2019-08-19 VITALS
HEART RATE: 87 BPM | WEIGHT: 182.98 LBS | DIASTOLIC BLOOD PRESSURE: 85 MMHG | RESPIRATION RATE: 16 BRPM | TEMPERATURE: 97.8 F | SYSTOLIC BLOOD PRESSURE: 126 MMHG | OXYGEN SATURATION: 100 % | BODY MASS INDEX: 24.82 KG/M2

## 2019-08-19 LAB
EOSINOPHIL # BLD AUTO: 0.1 K/UL — SIGNIFICANT CHANGE UP (ref 0–0.5)
EOSINOPHIL NFR BLD AUTO: 4 % — SIGNIFICANT CHANGE UP (ref 0–6)
HCT VFR BLD CALC: 24.7 % — LOW (ref 39–50)
HGB BLD-MCNC: 8.7 G/DL — LOW (ref 13–17)
HYPERCHROMIA BLD QL AUTO: SLIGHT — SIGNIFICANT CHANGE UP
LYMPHOCYTES # BLD AUTO: 0.7 K/UL — LOW (ref 1–3.3)
LYMPHOCYTES # BLD AUTO: 76 % — HIGH (ref 13–44)
MCHC RBC-ENTMCNC: 33.4 PG — SIGNIFICANT CHANGE UP (ref 27–34)
MCHC RBC-ENTMCNC: 35.1 G/DL — SIGNIFICANT CHANGE UP (ref 32–36)
MCV RBC AUTO: 95.3 FL — SIGNIFICANT CHANGE UP (ref 80–100)
MONOCYTES # BLD AUTO: 0.1 K/UL — SIGNIFICANT CHANGE UP (ref 0–0.9)
MONOCYTES NFR BLD AUTO: 12 % — SIGNIFICANT CHANGE UP (ref 2–14)
NEUTROPHILS # BLD AUTO: 0.1 K/UL — LOW (ref 1.8–7.4)
NEUTROPHILS NFR BLD AUTO: 8 % — LOW (ref 43–77)
OVALOCYTES BLD QL SMEAR: SLIGHT — SIGNIFICANT CHANGE UP
PLAT MORPH BLD: NORMAL — SIGNIFICANT CHANGE UP
PLATELET # BLD AUTO: 24 K/UL — LOW (ref 150–400)
RBC # BLD: 2.59 M/UL — LOW (ref 4.2–5.8)
RBC # FLD: 14.5 % — SIGNIFICANT CHANGE UP (ref 10.3–14.5)
RBC BLD AUTO: SIGNIFICANT CHANGE UP
TARGETS BLD QL SMEAR: SLIGHT — SIGNIFICANT CHANGE UP
WBC # BLD: 1 K/UL — CRITICAL LOW (ref 3.8–10.5)
WBC # FLD AUTO: 1 K/UL — CRITICAL LOW (ref 3.8–10.5)

## 2019-08-19 PROCEDURE — 99214 OFFICE O/P EST MOD 30 MIN: CPT

## 2019-08-21 ENCOUNTER — RESULT REVIEW (OUTPATIENT)
Age: 59
End: 2019-08-21

## 2019-08-21 ENCOUNTER — APPOINTMENT (OUTPATIENT)
Dept: PULMONOLOGY | Facility: CLINIC | Age: 59
End: 2019-08-21
Payer: COMMERCIAL

## 2019-08-21 ENCOUNTER — LABORATORY RESULT (OUTPATIENT)
Age: 59
End: 2019-08-21

## 2019-08-21 ENCOUNTER — INBOUND DOCUMENT (OUTPATIENT)
Age: 59
End: 2019-08-21

## 2019-08-21 ENCOUNTER — APPOINTMENT (OUTPATIENT)
Dept: INFUSION THERAPY | Facility: HOSPITAL | Age: 59
End: 2019-08-21

## 2019-08-21 LAB
BASOPHILS # BLD AUTO: 0 K/UL — SIGNIFICANT CHANGE UP (ref 0–0.2)
EOSINOPHIL # BLD AUTO: 0 K/UL — SIGNIFICANT CHANGE UP (ref 0–0.5)
HCT VFR BLD CALC: 25.4 % — LOW (ref 39–50)
HGB BLD-MCNC: 8.7 G/DL — LOW (ref 13–17)
LYMPHOCYTES # BLD AUTO: 0.6 K/UL — LOW (ref 1–3.3)
LYMPHOCYTES # BLD AUTO: 60 % — HIGH (ref 13–44)
MCHC RBC-ENTMCNC: 32.8 PG — SIGNIFICANT CHANGE UP (ref 27–34)
MCHC RBC-ENTMCNC: 34.4 G/DL — SIGNIFICANT CHANGE UP (ref 32–36)
MCV RBC AUTO: 95.5 FL — SIGNIFICANT CHANGE UP (ref 80–100)
MONOCYTES # BLD AUTO: 0.2 K/UL — SIGNIFICANT CHANGE UP (ref 0–0.9)
MONOCYTES NFR BLD AUTO: 21 % — HIGH (ref 2–14)
NEUTROPHILS # BLD AUTO: 0.2 K/UL — LOW (ref 1.8–7.4)
NEUTROPHILS NFR BLD AUTO: 19 % — LOW (ref 43–77)
PLAT MORPH BLD: NORMAL — SIGNIFICANT CHANGE UP
PLATELET # BLD AUTO: 14 K/UL — CRITICAL LOW (ref 150–400)
PLATELET # BLD MANUAL: 41 K/UL — LOW (ref 150–400)
RBC # BLD: 2.65 M/UL — LOW (ref 4.2–5.8)
RBC # FLD: 14.5 % — SIGNIFICANT CHANGE UP (ref 10.3–14.5)
RBC BLD AUTO: SIGNIFICANT CHANGE UP
WBC # BLD: 1 K/UL — CRITICAL LOW (ref 3.8–10.5)
WBC # FLD AUTO: 1 K/UL — CRITICAL LOW (ref 3.8–10.5)

## 2019-08-21 PROCEDURE — 94060 EVALUATION OF WHEEZING: CPT

## 2019-08-21 PROCEDURE — 94726 PLETHYSMOGRAPHY LUNG VOLUMES: CPT

## 2019-08-21 PROCEDURE — 94729 DIFFUSING CAPACITY: CPT

## 2019-08-21 PROCEDURE — 82803 BLOOD GASES ANY COMBINATION: CPT

## 2019-08-21 PROCEDURE — ZZZZZ: CPT

## 2019-08-21 PROCEDURE — 36600 WITHDRAWAL OF ARTERIAL BLOOD: CPT | Mod: 59

## 2019-08-23 ENCOUNTER — RESULT REVIEW (OUTPATIENT)
Age: 59
End: 2019-08-23

## 2019-08-23 ENCOUNTER — APPOINTMENT (OUTPATIENT)
Dept: INFUSION THERAPY | Facility: HOSPITAL | Age: 59
End: 2019-08-23

## 2019-08-23 LAB
BLD GP AB SCN SERPL QL: NEGATIVE — SIGNIFICANT CHANGE UP
EOSINOPHIL NFR BLD AUTO: 1 % — SIGNIFICANT CHANGE UP (ref 0–6)
HCT VFR BLD CALC: 23.2 % — LOW (ref 39–50)
HGB BLD-MCNC: 7.8 G/DL — LOW (ref 13–17)
LYMPHOCYTES # BLD AUTO: 58 % — HIGH (ref 13–44)
LYMPHOCYTES # BLD AUTO: LOW K/UL (ref 1–3.3)
MCHC RBC-ENTMCNC: 32.4 PG — SIGNIFICANT CHANGE UP (ref 27–34)
MCHC RBC-ENTMCNC: 33.9 G/DL — SIGNIFICANT CHANGE UP (ref 32–36)
MCV RBC AUTO: 95.5 FL — SIGNIFICANT CHANGE UP (ref 80–100)
MONOCYTES NFR BLD AUTO: 18 % — HIGH (ref 2–14)
NEUTROPHILS # BLD AUTO: 0.6 K/UL — LOW (ref 1.8–7.4)
NEUTROPHILS NFR BLD AUTO: 23 % — LOW (ref 43–77)
PLAT MORPH BLD: NORMAL — SIGNIFICANT CHANGE UP
PLATELET # BLD AUTO: 32 K/UL — LOW (ref 150–400)
RBC # BLD: 2.43 M/UL — LOW (ref 4.2–5.8)
RBC # FLD: 14.1 % — SIGNIFICANT CHANGE UP (ref 10.3–14.5)
RBC BLD AUTO: SIGNIFICANT CHANGE UP
RH IG SCN BLD-IMP: POSITIVE — SIGNIFICANT CHANGE UP
WBC # BLD: 2.2 K/UL — LOW (ref 3.8–10.5)
WBC # FLD AUTO: 2.2 K/UL — LOW (ref 3.8–10.5)

## 2019-08-25 PROCEDURE — 86900 BLOOD TYPING SEROLOGIC ABO: CPT

## 2019-08-25 PROCEDURE — 86923 COMPATIBILITY TEST ELECTRIC: CPT

## 2019-08-25 PROCEDURE — 86850 RBC ANTIBODY SCREEN: CPT

## 2019-08-25 PROCEDURE — 86901 BLOOD TYPING SEROLOGIC RH(D): CPT

## 2019-08-26 ENCOUNTER — RESULT REVIEW (OUTPATIENT)
Age: 59
End: 2019-08-26

## 2019-08-26 ENCOUNTER — APPOINTMENT (OUTPATIENT)
Dept: INFUSION THERAPY | Facility: HOSPITAL | Age: 59
End: 2019-08-26

## 2019-08-26 LAB
HCT VFR BLD CALC: 22.6 % — LOW (ref 39–50)
HGB BLD-MCNC: 7.8 G/DL — LOW (ref 13–17)
LYMPHOCYTES # BLD AUTO: 26 % — SIGNIFICANT CHANGE UP (ref 13–44)
LYMPHOCYTES # BLD AUTO: SIGNIFICANT CHANGE UP (ref 1–3.3)
MCHC RBC-ENTMCNC: 32.7 PG — SIGNIFICANT CHANGE UP (ref 27–34)
MCHC RBC-ENTMCNC: 34.6 G/DL — SIGNIFICANT CHANGE UP (ref 32–36)
MCV RBC AUTO: 94.5 FL — SIGNIFICANT CHANGE UP (ref 80–100)
MONOCYTES NFR BLD AUTO: 26 % — HIGH (ref 2–14)
NEUTROPHILS # BLD AUTO: 1.6 K/UL — LOW (ref 1.8–7.4)
NEUTROPHILS NFR BLD AUTO: 48 % — SIGNIFICANT CHANGE UP (ref 43–77)
PLAT MORPH BLD: NORMAL — SIGNIFICANT CHANGE UP
PLATELET # BLD AUTO: 45 K/UL — LOW (ref 150–400)
RBC # BLD: 2.39 M/UL — LOW (ref 4.2–5.8)
RBC # FLD: 13.9 % — SIGNIFICANT CHANGE UP (ref 10.3–14.5)
RBC BLD AUTO: SIGNIFICANT CHANGE UP
WBC # BLD: 3.9 K/UL — SIGNIFICANT CHANGE UP (ref 3.8–10.5)
WBC # FLD AUTO: 3.9 K/UL — SIGNIFICANT CHANGE UP (ref 3.8–10.5)

## 2019-08-27 ENCOUNTER — APPOINTMENT (OUTPATIENT)
Dept: CARDIOLOGY | Facility: CLINIC | Age: 59
End: 2019-08-27
Payer: COMMERCIAL

## 2019-08-27 ENCOUNTER — NON-APPOINTMENT (OUTPATIENT)
Age: 59
End: 2019-08-27

## 2019-08-27 VITALS
SYSTOLIC BLOOD PRESSURE: 120 MMHG | WEIGHT: 186 LBS | BODY MASS INDEX: 25.19 KG/M2 | HEART RATE: 90 BPM | OXYGEN SATURATION: 98 % | HEIGHT: 72 IN | DIASTOLIC BLOOD PRESSURE: 77 MMHG

## 2019-08-27 PROCEDURE — 93000 ELECTROCARDIOGRAM COMPLETE: CPT

## 2019-08-27 PROCEDURE — 99215 OFFICE O/P EST HI 40 MIN: CPT

## 2019-08-27 RX ORDER — IRBESARTAN 150 MG/1
150 TABLET, FILM COATED ORAL
Refills: 0 | Status: DISCONTINUED | COMMUNITY
End: 2019-08-27

## 2019-08-27 RX ORDER — LEVOFLOXACIN 500 MG/1
500 TABLET, FILM COATED ORAL DAILY
Qty: 28 | Refills: 0 | Status: DISCONTINUED | COMMUNITY
Start: 2019-08-05 | End: 2019-08-27

## 2019-08-27 RX ORDER — BUPROPION HYDROCHLORIDE 75 MG/1
TABLET, FILM COATED ORAL
Refills: 0 | Status: DISCONTINUED | COMMUNITY
End: 2019-08-27

## 2019-08-27 RX ORDER — RIVAROXABAN 20 MG/1
20 TABLET, FILM COATED ORAL
Refills: 0 | Status: DISCONTINUED | COMMUNITY
End: 2019-08-27

## 2019-08-27 RX ORDER — ATENOLOL 25 MG/1
25 TABLET ORAL
Refills: 0 | Status: DISCONTINUED | COMMUNITY
End: 2019-08-27

## 2019-08-28 ENCOUNTER — OTHER (OUTPATIENT)
Age: 59
End: 2019-08-28

## 2019-08-28 ENCOUNTER — RESULT REVIEW (OUTPATIENT)
Age: 59
End: 2019-08-28

## 2019-08-28 ENCOUNTER — APPOINTMENT (OUTPATIENT)
Dept: HEMATOLOGY ONCOLOGY | Facility: CLINIC | Age: 59
End: 2019-08-28
Payer: COMMERCIAL

## 2019-08-28 VITALS
BODY MASS INDEX: 24.97 KG/M2 | WEIGHT: 184.06 LBS | HEART RATE: 83 BPM | SYSTOLIC BLOOD PRESSURE: 134 MMHG | OXYGEN SATURATION: 100 % | TEMPERATURE: 97.9 F | RESPIRATION RATE: 16 BRPM | DIASTOLIC BLOOD PRESSURE: 91 MMHG

## 2019-08-28 LAB
BASOPHILS # BLD AUTO: 0 K/UL — SIGNIFICANT CHANGE UP (ref 0–0.2)
BASOPHILS NFR BLD AUTO: 0.5 % — SIGNIFICANT CHANGE UP (ref 0–2)
EOSINOPHIL # BLD AUTO: 0.2 K/UL — SIGNIFICANT CHANGE UP (ref 0–0.5)
EOSINOPHIL NFR BLD AUTO: 2.8 % — SIGNIFICANT CHANGE UP (ref 0–6)
HCT VFR BLD CALC: 30.5 % — LOW (ref 39–50)
HGB BLD-MCNC: 11.1 G/DL — LOW (ref 13–17)
LYMPHOCYTES # BLD AUTO: 1.1 K/UL — SIGNIFICANT CHANGE UP (ref 1–3.3)
LYMPHOCYTES # BLD AUTO: 18.8 % — SIGNIFICANT CHANGE UP (ref 13–44)
MCHC RBC-ENTMCNC: 34.2 PG — HIGH (ref 27–34)
MCHC RBC-ENTMCNC: 36.4 G/DL — HIGH (ref 32–36)
MCV RBC AUTO: 94 FL — SIGNIFICANT CHANGE UP (ref 80–100)
MONOCYTES # BLD AUTO: 1.9 K/UL — HIGH (ref 0–0.9)
MONOCYTES NFR BLD AUTO: 33.6 % — HIGH (ref 2–14)
NEUTROPHILS # BLD AUTO: 2.5 K/UL — SIGNIFICANT CHANGE UP (ref 1.8–7.4)
NEUTROPHILS NFR BLD AUTO: 44.4 % — SIGNIFICANT CHANGE UP (ref 43–77)
PLATELET # BLD AUTO: 66 K/UL — LOW (ref 150–400)
RBC # BLD: 3.24 M/UL — LOW (ref 4.2–5.8)
RBC # FLD: 13.6 % — SIGNIFICANT CHANGE UP (ref 10.3–14.5)
WBC # BLD: 5.7 K/UL — SIGNIFICANT CHANGE UP (ref 3.8–10.5)
WBC # FLD AUTO: 5.7 K/UL — SIGNIFICANT CHANGE UP (ref 3.8–10.5)

## 2019-08-28 PROCEDURE — 77290 THER RAD SIMULAJ FIELD CPLX: CPT | Mod: 26

## 2019-08-28 PROCEDURE — 99215 OFFICE O/P EST HI 40 MIN: CPT

## 2019-08-30 ENCOUNTER — APPOINTMENT (OUTPATIENT)
Dept: HEMATOLOGY ONCOLOGY | Facility: CLINIC | Age: 59
End: 2019-08-30

## 2019-09-01 NOTE — HISTORY OF PRESENT ILLNESS
[de-identified] : Mr. Delong is a 58 y/o male, with a previously diagnosed acute myeloid leukemia. A bone marrow biopsy from 7/3 revealed Acute myeloid leukemia (AML). FISH - report shows a population of aberrant myeloid blasts. He is currently being treated by Dr. Coelho for high risk AML with HIDAC consolidation chemotherapy. He was referred by Dr. Coelho for an initial consultation of a Haplo- transplant. \par \par The patient has a history of multiple blood clots - factor V Leiden runs in the family. He was diagnosed with sleep apnea, but refuses to use CPAP. He is a former smoker, 40 years. He also has a past medical history of PE, HTN, cardiomyopathy. He recently underwent an amputation of the right first toe due to an infection. [de-identified] : The patient presents for a follow up for consultation of a Haplo- transplant. He is presently being treated for AML under the care of Dr. Coelho with HIDAC consolidation chemotherapy. He has now received induction therapy and one consolidation. He recently underwent an amputation of the right first toe and is recovering well, not using a walker. He continues to c/o a stubbed toe. He reports recent weight gain as well having a good appetite. Denies mouth sores, eye dryness, blurred vision, nausea, vomiting, diarrhea. No CP, SOB or LE edema......The patient has potential donors, both related as well as unrelated.\par Donors: \par     The patient 1/2 matched with all four of his children;Two daughters and two sons.\par     The patient 1/2 matched with his 49 y/o sister.\par     The patient 1/2 matched with unrelated donors on the international registry.\par Antibodies are negative

## 2019-09-01 NOTE — ASSESSMENT
[FreeTextEntry1] : 58 y/o male with high risk AML. He is being seen by Dr. Coelho for tx with HIDAC consolidation.The patient has now received induction therapy as well as one consolidation treatment.\par Consultation of a haplo transplant was once again discussed with the pt and his significant other.  \par \par The patients has multiple potential donors including his four children who are all 1/2 matches; two daughters and two sons. Favor Izaiah\par The patient's sister, who is 49 y/o is also a potential donor being that she is 1/2 match. \par \par \par It was explained to the patient that his two sons would work best in his favor. Antibody testing was done and results showed no antibodies against both sons. The blood type of both donors will be evaluated, the patient blood type is ( A+). Izaiah is O\par \par I have reviewed with the patient the rationale, risks and benefits of treatment with chemotherapy alone vs.chemotherapy plus haplo-transplant. \par \par I have also reviewed the pre-transplant testing for vital organ testing including, but not limited to ECHO, MUGA, PFTs, Urine analysis, BM Bx, LFTs, sinus -xrays and Dental evaluation, .\par \par I discussed with the patient, the role of a donor. I have reviewed with the patient the donor screening process including a physical exam, HLA typing and antibody testing, and the stem cell collection process. I discussed the role of Neupogen for 5 days prior to obtaining the specimen. Side effects including but not limited to fever, body aches from Neupogen injections was discussed. \par \par I have also discussed the process of apheresis as a way to collect the peripheral stem cells. Anticoagulation with Citrate during apheresis was discussed, along with side effects including but not limited to hypocalcemia mild dysesthesias (most common) to tetany, seizures and cardiac arrhythmias. Treatment with no oral or intravenous calcium supplementation in the setting of hypocalcemia was also discussed. \par \par I have discussed with the patient the pre-administration of Kepivance IV x 3 days, as a GI prophylaxis with the aim to prevent swelling, irritation, sores, and ulcers in the GI tract caused by high dose chemotherapy. Side effects including but not limited to flushing, itching from Kepivance were also discussed. \par \par I have discussed with the patient the four week hospital stay for the transplant. I have reviewed with the patient the initial chemotherapy with Fludarabine and Cytoxan as well as a single dose of radiation (TBI at 200cG) in the pre-transplant state. The patient will receive the stem cells by a triple lumen catheter following the radiation. I have discussed the role of high dose Cytoxan following the transplant on day 3 and day 4. I have discussed the role of CellCept x 30 days and Tacrolimus x 180 days in the post-transplant state as immunosuppressants. I have discussed the role of supportive care in the weeks following the transplant with the possibility of needing PRBCs, platelets, fluids, and/or electrolytes. \par \par I discussed with the patient, post-transplant precautions including the use of antiviral, antifungal, antibiotics, and immunosuppressant. I discussed with the patient post-transplant complications including but not limited to bleeding, infection, end-organ damage, mucositis, veno-occlusive disease, graft failure, graft rejection, progression of disease as well as GVHD (both acute and chronic). Possible symptoms of GVHD including, but not limited to, rash, diarrhea, nausea, vomiting, mouth sores, liver inflammation were discussed. I informed the patient of post-discharge expectations as well, including fatigue and “flu-like” symptoms. Post-discharge precautions including diet and crowd control discussed as well. I reviewed with the patient the risks and benefits of this procedure. I reviewed the timing of the transplant with the patient. \par \par I informed the patient that there is a 15% risk of a catastrophic event to occur, including but not limited to relapse, graft rejection, severe infection and death is during the first year post-transplant. Also, discussed with the patient that the risk of relapse decreases with each additional year post-transplant, and that at the 5 year manju cure may be achieved. \par \par Literature and consents for Haplo- transplant were provided to the patient for review. Patient to consider option. All questions were addressed and emotional support provided.\par \par He is currently requiring transfusions, CBC today shows WBC 3.9, HGB 7.8, PLT 45K\par He currently has a pic line in place.\par He was advised to stop Levaquin.\par He is currently taking Flomax .\par \par -Patient's cardiology clearance is pending.\par -Plan to repeat BMbx within 30 days of admission.\par -RTC in 3 weeks....complete orientation and pre testing.\par -Aim to admission: 09/27/19\par -Aim to collect on 09/02/2019\par -Aim to administer stem cells on 10/03/19\par - need to clear son...possible appt sept 12\par Patient has been advised to contact clinic if he has any concerns.

## 2019-09-01 NOTE — REVIEW OF SYSTEMS
[Skin Wound] : skin wound [Negative] : Allergic/Immunologic [FreeTextEntry8] : . [de-identified] : toe infection right great toe improved

## 2019-09-01 NOTE — PHYSICAL EXAM
[Normal] : affect appropriate [Ambulatory and capable of all self care but unable to carry out any work activities] : Status 2- Ambulatory and capable of all self care but unable to carry out any work activities. Up and about more than 50% of waking hours [de-identified] : Recent right first toe amputation.

## 2019-09-01 NOTE — ADDENDUM
[FreeTextEntry1] : Documented by Pa Mulligan acting as a scribe for Dr. Rosina Gaytan on 08/28/19.\par \par All medical record entries made by the Scribe were at my, Dr. Rosina Gaytan, direction and personally dictated by me on 08/28/19. I have reviewed the chart and agree that  the record accurately reflects my personal performance of the history, physical exam, assessment and plan. I have also personally directed, reviewed, and agree with the discharge instructions.\par

## 2019-09-02 ENCOUNTER — FORM ENCOUNTER (OUTPATIENT)
Age: 59
End: 2019-09-02

## 2019-09-03 ENCOUNTER — OUTPATIENT (OUTPATIENT)
Dept: OUTPATIENT SERVICES | Facility: HOSPITAL | Age: 59
LOS: 1 days | End: 2019-09-03
Payer: COMMERCIAL

## 2019-09-03 ENCOUNTER — OTHER (OUTPATIENT)
Age: 59
End: 2019-09-03

## 2019-09-03 ENCOUNTER — OUTPATIENT (OUTPATIENT)
Dept: OUTPATIENT SERVICES | Facility: HOSPITAL | Age: 59
LOS: 1 days | Discharge: HOME | End: 2019-09-03

## 2019-09-03 ENCOUNTER — APPOINTMENT (OUTPATIENT)
Dept: MRI IMAGING | Facility: CLINIC | Age: 59
End: 2019-09-03
Payer: COMMERCIAL

## 2019-09-03 DIAGNOSIS — C92.00 ACUTE MYELOBLASTIC LEUKEMIA, NOT HAVING ACHIEVED REMISSION: ICD-10-CM

## 2019-09-03 DIAGNOSIS — Z00.8 ENCOUNTER FOR OTHER GENERAL EXAMINATION: ICD-10-CM

## 2019-09-03 PROCEDURE — 75561 CARDIAC MRI FOR MORPH W/DYE: CPT

## 2019-09-03 PROCEDURE — 75561 CARDIAC MRI FOR MORPH W/DYE: CPT | Mod: 26

## 2019-09-03 PROCEDURE — A9585: CPT

## 2019-09-04 NOTE — ASSESSMENT
[FreeTextEntry1] : 58 y/o male with a AML. \par Bone marrow biopsy from 7/3 revealed  Acute myeloid leukemia (AML). FISH - report shows a population of\par aberrant myeloid blasts.\par \par S/P HIDAC consolidation on 7/29. Today is D22.  Feeling well, saw surgeon who had done amputation of R great toe on 8/17.  Wound site healing well.  Pt stubbed 2nd and 3rd toes of L foot a few days ago-surgeon did Xray of area-normal findings.  Hgb 8.7 today.  Plts 24.  Will continue to follow.  Continue  Levaquin for now.\par \par Pt has appt with transplant team.   \par

## 2019-09-04 NOTE — REVIEW OF SYSTEMS
[Skin Wound] : skin wound [Negative] : Allergic/Immunologic [de-identified] : toe infection right great toe, had amputation 7/26  [FreeTextEntry8] : .

## 2019-09-04 NOTE — HISTORY OF PRESENT ILLNESS
[de-identified] : This is a 60 y/o male present for an evaluation of a previously diagnosed acute myeloid leukemia. Pt spent 6 weeks in [place], receiving chemotherapy. Wound complications with treatment included toe infection, fungal infection on right cheek, and high fever(105). Pt has a history of multiple blood clots - factor V Leiden runs in family. His most recent clotting - In 2/2019, follow up w/ his regular hematologist revealed everything to be stable. Pt traveled for one month to [place] and [place]. In 4/2019, he returned to his hematologist complaining of SOB. Hewas rushed to the hospital, where they discovered low platelets, but pt states he discharged himself. Pt states his clotting was managed w/ Coumadin in the past, but due to adverse effects, is now currently taking Xarelto. Otherwise, he has no other cardiovascular issues. Pt diagnosed w/ sleep apnea, but refuses to use CPAP. Last colonoscopy completed 10 years ago. Former smoker, 40 years. No longer consumes alcohol, but in the past consumed multiple alcoholic beverages a day.\par Today he presents with 2 family members. He is ambulating via walker. He has completed 3 weeks of rehab at [facility] on 6/27. He complains of burning sensation w/ urination. Appetite has been good, gained 5 pounds in a few days, but states taste is impaired.  [de-identified] : Patient presents for a follow up appointment. He was recently admitted to [facility] on 7/11 to 7/16 for HIDAC consolidation chemotherapy but was held due to infection of his right great toe - hospital summary stated below. MRI from 7/11 revealed signal alteration of distal phalanx of the great toe. Given reported physical findings of overlying wound extending bone, this is suspicious osteomyelitis. There may be superimposed fracture of the tuft of the distal phalanx. \par He feels well today, denies pain in his toes. \par \par Hospital Course: \par Pt received C1 HiDAC consolidation from 7/29- 8/2.\par \par Pt had R great toe amputation due to osteomyelitis on 7/26; healing well with no complications.   \par \par Pt denies fatigue, N/V, constipation, fevers, chills.     \par \par \par \par

## 2019-09-05 ENCOUNTER — RESULT REVIEW (OUTPATIENT)
Age: 59
End: 2019-09-05

## 2019-09-05 ENCOUNTER — APPOINTMENT (OUTPATIENT)
Dept: HEMATOLOGY ONCOLOGY | Facility: CLINIC | Age: 59
End: 2019-09-05

## 2019-09-05 LAB
BASOPHILS # BLD AUTO: 0 K/UL — SIGNIFICANT CHANGE UP (ref 0–0.2)
BASOPHILS NFR BLD AUTO: 0.3 % — SIGNIFICANT CHANGE UP (ref 0–2)
EOSINOPHIL # BLD AUTO: 0 K/UL — SIGNIFICANT CHANGE UP (ref 0–0.5)
EOSINOPHIL NFR BLD AUTO: 0.3 % — SIGNIFICANT CHANGE UP (ref 0–6)
HCT VFR BLD CALC: 29.4 % — LOW (ref 39–50)
HGB BLD-MCNC: 9.9 G/DL — LOW (ref 13–17)
LYMPHOCYTES # BLD AUTO: 1.3 K/UL — SIGNIFICANT CHANGE UP (ref 1–3.3)
LYMPHOCYTES # BLD AUTO: 14.7 % — SIGNIFICANT CHANGE UP (ref 13–44)
MCHC RBC-ENTMCNC: 32.1 PG — SIGNIFICANT CHANGE UP (ref 27–34)
MCHC RBC-ENTMCNC: 33.6 G/DL — SIGNIFICANT CHANGE UP (ref 32–36)
MCV RBC AUTO: 95.5 FL — SIGNIFICANT CHANGE UP (ref 80–100)
MONOCYTES # BLD AUTO: 2.4 K/UL — HIGH (ref 0–0.9)
MONOCYTES NFR BLD AUTO: 27.6 % — HIGH (ref 2–14)
NEUTROPHILS # BLD AUTO: 5 K/UL — SIGNIFICANT CHANGE UP (ref 1.8–7.4)
NEUTROPHILS NFR BLD AUTO: 57.1 % — SIGNIFICANT CHANGE UP (ref 43–77)
PLATELET # BLD AUTO: 128 K/UL — LOW (ref 150–400)
RBC # BLD: 3.08 M/UL — LOW (ref 4.2–5.8)
RBC # FLD: 15.2 % — HIGH (ref 10.3–14.5)
WBC # BLD: 8.8 K/UL — SIGNIFICANT CHANGE UP (ref 3.8–10.5)
WBC # FLD AUTO: 8.8 K/UL — SIGNIFICANT CHANGE UP (ref 3.8–10.5)

## 2019-09-06 ENCOUNTER — OTHER (OUTPATIENT)
Age: 59
End: 2019-09-06

## 2019-09-11 ENCOUNTER — OTHER (OUTPATIENT)
Age: 59
End: 2019-09-11

## 2019-09-17 PROCEDURE — 93224 XTRNL ECG REC UP TO 48 HRS: CPT

## 2019-09-19 ENCOUNTER — OTHER (OUTPATIENT)
Age: 59
End: 2019-09-19

## 2019-09-23 ENCOUNTER — OUTPATIENT (OUTPATIENT)
Dept: OUTPATIENT SERVICES | Facility: HOSPITAL | Age: 59
LOS: 1 days | End: 2019-09-23
Payer: COMMERCIAL

## 2019-09-23 DIAGNOSIS — C92.00 ACUTE MYELOBLASTIC LEUKEMIA, NOT HAVING ACHIEVED REMISSION: ICD-10-CM

## 2019-09-24 ENCOUNTER — RESULT REVIEW (OUTPATIENT)
Age: 59
End: 2019-09-24

## 2019-09-24 ENCOUNTER — LABORATORY RESULT (OUTPATIENT)
Age: 59
End: 2019-09-24

## 2019-09-24 ENCOUNTER — APPOINTMENT (OUTPATIENT)
Dept: HEMATOLOGY ONCOLOGY | Facility: CLINIC | Age: 59
End: 2019-09-24
Payer: COMMERCIAL

## 2019-09-24 ENCOUNTER — INBOUND DOCUMENT (OUTPATIENT)
Age: 59
End: 2019-09-24

## 2019-09-24 VITALS
OXYGEN SATURATION: 100 % | RESPIRATION RATE: 17 BRPM | DIASTOLIC BLOOD PRESSURE: 63 MMHG | HEART RATE: 81 BPM | TEMPERATURE: 97.9 F | SYSTOLIC BLOOD PRESSURE: 97 MMHG | WEIGHT: 180.78 LBS | BODY MASS INDEX: 24.52 KG/M2

## 2019-09-24 LAB
BASOPHILS # BLD AUTO: 0 K/UL — SIGNIFICANT CHANGE UP (ref 0–0.2)
BASOPHILS NFR BLD AUTO: 0.4 % — SIGNIFICANT CHANGE UP (ref 0–2)
EOSINOPHIL # BLD AUTO: 0.4 K/UL — SIGNIFICANT CHANGE UP (ref 0–0.5)
EOSINOPHIL NFR BLD AUTO: 4.7 % — SIGNIFICANT CHANGE UP (ref 0–6)
HCT VFR BLD CALC: 33.7 % — LOW (ref 39–50)
HGB BLD-MCNC: 11.3 G/DL — LOW (ref 13–17)
LYMPHOCYTES # BLD AUTO: 2 K/UL — SIGNIFICANT CHANGE UP (ref 1–3.3)
LYMPHOCYTES # BLD AUTO: 21.5 % — SIGNIFICANT CHANGE UP (ref 13–44)
MCHC RBC-ENTMCNC: 32.4 PG — SIGNIFICANT CHANGE UP (ref 27–34)
MCHC RBC-ENTMCNC: 33.6 G/DL — SIGNIFICANT CHANGE UP (ref 32–36)
MCV RBC AUTO: 96.6 FL — SIGNIFICANT CHANGE UP (ref 80–100)
MONOCYTES # BLD AUTO: 1.1 K/UL — HIGH (ref 0–0.9)
MONOCYTES NFR BLD AUTO: 12.2 % — SIGNIFICANT CHANGE UP (ref 2–14)
NEUTROPHILS # BLD AUTO: 5.7 K/UL — SIGNIFICANT CHANGE UP (ref 1.8–7.4)
NEUTROPHILS NFR BLD AUTO: 61.2 % — SIGNIFICANT CHANGE UP (ref 43–77)
PLATELET # BLD AUTO: 90 K/UL — LOW (ref 150–400)
RBC # BLD: 3.49 M/UL — LOW (ref 4.2–5.8)
RBC # FLD: 14.9 % — HIGH (ref 10.3–14.5)
WBC # BLD: 9.4 K/UL — SIGNIFICANT CHANGE UP (ref 3.8–10.5)
WBC # FLD AUTO: 9.4 K/UL — SIGNIFICANT CHANGE UP (ref 3.8–10.5)

## 2019-09-24 PROCEDURE — 85097 BONE MARROW INTERPRETATION: CPT

## 2019-09-24 PROCEDURE — 87205 SMEAR GRAM STAIN: CPT

## 2019-09-24 PROCEDURE — 88313 SPECIAL STAINS GROUP 2: CPT

## 2019-09-24 PROCEDURE — 88341 IMHCHEM/IMCYTCHM EA ADD ANTB: CPT

## 2019-09-24 PROCEDURE — 88360 TUMOR IMMUNOHISTOCHEM/MANUAL: CPT

## 2019-09-24 PROCEDURE — 88342 IMHCHEM/IMCYTCHM 1ST ANTB: CPT | Mod: 26,59

## 2019-09-24 PROCEDURE — 88313 SPECIAL STAINS GROUP 2: CPT | Mod: 26

## 2019-09-24 PROCEDURE — 88341 IMHCHEM/IMCYTCHM EA ADD ANTB: CPT | Mod: 26,59

## 2019-09-24 PROCEDURE — 88280 CHROMOSOME KARYOTYPE STUDY: CPT

## 2019-09-24 PROCEDURE — 88305 TISSUE EXAM BY PATHOLOGIST: CPT | Mod: 26

## 2019-09-24 PROCEDURE — 88360 TUMOR IMMUNOHISTOCHEM/MANUAL: CPT | Mod: 26

## 2019-09-24 PROCEDURE — 88184 FLOWCYTOMETRY/ TC 1 MARKER: CPT

## 2019-09-24 PROCEDURE — 88189 FLOWCYTOMETRY/READ 16 & >: CPT

## 2019-09-24 PROCEDURE — 88305 TISSUE EXAM BY PATHOLOGIST: CPT

## 2019-09-24 PROCEDURE — 88237 TISSUE CULTURE BONE MARROW: CPT

## 2019-09-24 PROCEDURE — 38222 DX BONE MARROW BX & ASPIR: CPT | Mod: LT

## 2019-09-24 PROCEDURE — 99215 OFFICE O/P EST HI 40 MIN: CPT | Mod: 25

## 2019-09-24 PROCEDURE — 88185 FLOWCYTOMETRY/TC ADD-ON: CPT

## 2019-09-24 PROCEDURE — 88264 CHROMOSOME ANALYSIS 20-25: CPT

## 2019-09-24 RX ORDER — SERTRALINE HYDROCHLORIDE 100 MG/1
100 TABLET, FILM COATED ORAL DAILY
Qty: 30 | Refills: 0 | Status: DISCONTINUED | COMMUNITY
Start: 2019-08-28 | End: 2019-09-24

## 2019-09-24 NOTE — PHYSICAL EXAM
[Ambulatory and capable of all self care but unable to carry out any work activities] : Status 2- Ambulatory and capable of all self care but unable to carry out any work activities. Up and about more than 50% of waking hours [Normal] : affect appropriate [de-identified] : Recent right first toe amputation.

## 2019-09-24 NOTE — ADDENDUM
[FreeTextEntry1] : Documented by Rick Moore acting as a scribe for Dr. Rosina Gaytan on 09/24/2019.\par \par All medical record entries made by the Scribe were at my, Dr. Rosina Gaytan, direction and personally dictated by me on 09/24/2019. I have reviewed the chart and agree that  the record accurately reflects my personal performance of the history, physical exam, assessment and plan. I have also personally directed, reviewed, and agree with the discharge instructions.

## 2019-09-24 NOTE — ASSESSMENT
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 He is being seen by Dr. Coelho for tx with HIDAC consolidation.The patient has now received induction therapy as well as one consolidation treatment.\par Consultation of a haplo transplant was once again discussed with the pt.\par \par The patients has multiple potential donors including his four children who are all 1/2 matches; two daughters and two sons. Favor Izaiah\delia The patient's sister, who is 51 y/o is also a potential donor being that she is 1/2 match. \par \par It was explained to the patient that his two sons would work best in his favor. Antibody testing was done and results showed no antibodies against both sons. The blood type of both donors will be evaluated, the patient blood type is ( A+). Izaiah is O\par \par I have reviewed with the patient the rationale, risks and benefits of treatment with chemotherapy alone vs.chemotherapy plus haplo-transplant. \par \par I have also reviewed the pre-transplant testing for vital organ testing including, but not limited to ECHO, MUGA, PFTs, Urine analysis, BM Bx, LFTs, sinus -xrays and Dental evaluation, .\par \par I discussed with the patient, the role of a donor. I have reviewed with the patient the donor screening process including a physical exam, HLA typing and antibody testing, and the stem cell collection process. I discussed the role of Neupogen for 5 days prior to obtaining the specimen. Side effects including but not limited to fever, body aches from Neupogen injections was discussed. \par \par I have also discussed the process of apheresis as a way to collect the peripheral stem cells. Anticoagulation with Citrate during apheresis was discussed, along with side effects including but not limited to hypocalcemia mild dysesthesias (most common) to tetany, seizures and cardiac arrhythmias. Treatment with no oral or intravenous calcium supplementation in the setting of hypocalcemia was also discussed. \par \par I have discussed with the patient the pre-administration of Kepivance IV x 3 days, as a GI prophylaxis with the aim to prevent swelling, irritation, sores, and ulcers in the GI tract caused by high dose chemotherapy. Side effects including but not limited to flushing, itching from Kepivance were also discussed. \par \par I have discussed with the patient the four week hospital stay for the transplant. I have reviewed with the patient the initial chemotherapy with Fludarabine and Cytoxan as well as a single dose of radiation (TBI at 200cG) in the pre-transplant state. The patient will receive the stem cells by a triple lumen catheter following the radiation. I have discussed the role of high dose Cytoxan following the transplant on day 3 and day 4. I have discussed the role of CellCept x 30 days and Tacrolimus x 180 days in the post-transplant state as immunosuppressants. I have discussed the role of supportive care in the weeks following the transplant with the possibility of needing PRBCs, platelets, fluids, and/or electrolytes. \par \par I discussed with the patient, post-transplant precautions including the use of antiviral, antifungal, antibiotics, and immunosuppressant. I discussed with the patient post-transplant complications including but not limited to bleeding, infection, end-organ damage, mucositis, veno-occlusive disease, graft failure, graft rejection, progression of disease as well as GVHD (both acute and chronic). Possible symptoms of GVHD including, but not limited to, rash, diarrhea, nausea, vomiting, mouth sores, liver inflammation were discussed. I informed the patient of post-discharge expectations as well, including fatigue and “flu-like” symptoms. Post-discharge precautions including diet and crowd control discussed as well. I reviewed with the patient the risks and benefits of this procedure. I reviewed the timing of the transplant with the patient. \par \par I informed the patient that there is a 15% risk of a catastrophic event to occur, including but not limited to relapse, graft rejection, severe infection and death is during the first year post-transplant. Also, discussed with the patient that the risk of relapse decreases with each additional year post-transplant, and that at the 5 year manju cure may be achieved. \par \par Literature and consents for Haplo- transplant were provided to the patient for review. Patient to consider option. All questions were addressed and emotional support provided.\par \par Counts: WBC 9.4 HGB 11.3 ANC 5.7 PLT 90\par He currently has a picc line in place.\par He was advised to stop Levaquin.\par He is currently taking Flomax .\par \par -Patient's cardiology clearance is done. Visit with cardiologist on 9/26. They will follow upon admit..pt understands thqat he is a high risk candidate because of his heart issues...he has never had heart failure sx\par -Patient will be bringing a dentist note clearance.\par -He underwent a BMBx today. WIll review results.\par -Patient has completed orientation and pre testing.\par -Instructed patient to stop taking blood thinner medication as of 9/26/19.\par -Kepivance on 9/30, 10/1, 10/2\par -Aim for admission: 10/3/19\par -Aim for collection on 10/8/19\par -Aim for transplant of stem cells on 10/9/19\par Patient has been advised to contact clinic if he has any concerns.\par RTC for f/u with Dr. Gaytan as requested.

## 2019-09-24 NOTE — HISTORY OF PRESENT ILLNESS
[de-identified] : Mr. Delong is a 60 y/o male, with a previously diagnosed acute myeloid leukemia. A bone marrow biopsy from 7/3 revealed Acute myeloid leukemia (AML). FISH - report shows a population of aberrant myeloid blasts. He is currently being treated by Dr. Coelho for high risk AML with HIDAC consolidation chemotherapy. He was referred by Dr. Coelho for an initial consultation of a Haplo- transplant. \par \par The patient has a history of multiple blood clots - factor V Leiden runs in the family. He was diagnosed with sleep apnea, but refuses to use CPAP. He is a former smoker, 40 years. He also has a past medical history of PE, HTN, cardiomyopathy. He recently underwent an amputation of the right first toe due to an infection. [de-identified] : The patient presents for a follow up consultation of a Haplo- transplant. He offers no acute complaints today. He had a recent amputation of his right first toe. He underwent a BMBx today. Denies mouth sores, eye dryness, blurred vision, nausea, vomiting, diarrhea. No CP, SOB or LE edema......The patient has potential donors, both related as well as unrelated.\par Donors: \par     The patient 1/2 matched with all four of his children;Two daughters and two sons.\par     The patient 1/2 matched with his 51 y/o sister.\par     The patient 1/2 matched with unrelated donors on the international registry.\par Antibodies are negative\par BM BX today

## 2019-09-24 NOTE — REVIEW OF SYSTEMS
[Skin Wound] : skin wound [Negative] : Allergic/Immunologic [Fatigue] : fatigue [FreeTextEntry8] : . [de-identified] : toe infection right great toe improved

## 2019-09-25 ENCOUNTER — RESULT REVIEW (OUTPATIENT)
Age: 59
End: 2019-09-25

## 2019-09-25 NOTE — PROCEDURE
[Bone Marrow Biopsy] : bone marrow biopsy [Bone Marrow Aspiration] : bone marrow aspiration  [Patient] : the patient [Patient identification verified] : patient identification verified [Procedure verified and consent obtained] : procedure verified and consent obtained [Laterality verified and correct site marked] : laterality verified and correct site marked [Left] : site: left [Correct positioning] : correct positioning [Prone] : prone [Superior iliac spine was identified] : the superior iliac spine was identified. [The left posterior iliac crest was prepped with betadine and draped, using sterile technique.] : The left posterior iliac crest was prepped with betadine and draped, using sterile technique. [Lidocaine was injected and into the periosteum overlying the site.] : Lidocaine was injected and into the periosteum overlying the site. [Aspirate] : aspirate [Cytogenetics] : cytogenetics [FISH] : FISH [Biopsy] : biopsy [Flow Cytometry] : flow cytometry [] : The patient was instructed to remove the bandage the following AM. The patient may bathe. Acetaminophen may be taken for discomfort, as per package directions.If there are any other problems, the patient was instructed to call the office. The patient verbalized understanding, and is aware of the office contact numbers. [FreeTextEntry2] : Medications and allergies reviewed. Patient has not taken Xarelto in 3 days. CBC reviewed.  8cc of 1 percent lidocaine injected to achieve analgesia.  Aspirate and biopsy obtained.  Hemostasis achieved.  DSD placed. Patient tolerated procedure well.  Labels reviewed by myself and Elisa Bain NP. Patient advised to call in 1 week for test results.\par  [FreeTextEntry1] : AML

## 2019-09-26 ENCOUNTER — NON-APPOINTMENT (OUTPATIENT)
Age: 59
End: 2019-09-26

## 2019-09-26 ENCOUNTER — APPOINTMENT (OUTPATIENT)
Dept: CARDIOLOGY | Facility: CLINIC | Age: 59
End: 2019-09-26
Payer: COMMERCIAL

## 2019-09-26 VITALS
DIASTOLIC BLOOD PRESSURE: 76 MMHG | SYSTOLIC BLOOD PRESSURE: 118 MMHG | WEIGHT: 178 LBS | HEART RATE: 69 BPM | OXYGEN SATURATION: 97 % | HEIGHT: 72 IN | BODY MASS INDEX: 24.11 KG/M2

## 2019-09-26 LAB — HEMATOPATHOLOGY REPORT: SIGNIFICANT CHANGE UP

## 2019-09-26 PROCEDURE — 93000 ELECTROCARDIOGRAM COMPLETE: CPT

## 2019-09-26 PROCEDURE — 99214 OFFICE O/P EST MOD 30 MIN: CPT

## 2019-09-30 ENCOUNTER — RESULT REVIEW (OUTPATIENT)
Age: 59
End: 2019-09-30

## 2019-09-30 ENCOUNTER — LABORATORY RESULT (OUTPATIENT)
Age: 59
End: 2019-09-30

## 2019-09-30 ENCOUNTER — APPOINTMENT (OUTPATIENT)
Dept: INFUSION THERAPY | Facility: HOSPITAL | Age: 59
End: 2019-09-30

## 2019-09-30 LAB
BASOPHILS # BLD AUTO: 0 K/UL — SIGNIFICANT CHANGE UP (ref 0–0.2)
BASOPHILS NFR BLD AUTO: 0.3 % — SIGNIFICANT CHANGE UP (ref 0–2)
EOSINOPHIL # BLD AUTO: 0.2 K/UL — SIGNIFICANT CHANGE UP (ref 0–0.5)
EOSINOPHIL NFR BLD AUTO: 2.4 % — SIGNIFICANT CHANGE UP (ref 0–6)
HCT VFR BLD CALC: 33.5 % — LOW (ref 39–50)
HGB BLD-MCNC: 11.3 G/DL — LOW (ref 13–17)
LYMPHOCYTES # BLD AUTO: 1.9 K/UL — SIGNIFICANT CHANGE UP (ref 1–3.3)
LYMPHOCYTES # BLD AUTO: 21 % — SIGNIFICANT CHANGE UP (ref 13–44)
MCHC RBC-ENTMCNC: 32.4 PG — SIGNIFICANT CHANGE UP (ref 27–34)
MCHC RBC-ENTMCNC: 33.8 G/DL — SIGNIFICANT CHANGE UP (ref 32–36)
MCV RBC AUTO: 95.9 FL — SIGNIFICANT CHANGE UP (ref 80–100)
MONOCYTES # BLD AUTO: 0.9 K/UL — SIGNIFICANT CHANGE UP (ref 0–0.9)
MONOCYTES NFR BLD AUTO: 9.8 % — SIGNIFICANT CHANGE UP (ref 2–14)
NEUTROPHILS # BLD AUTO: 5.9 K/UL — SIGNIFICANT CHANGE UP (ref 1.8–7.4)
NEUTROPHILS NFR BLD AUTO: 66.6 % — SIGNIFICANT CHANGE UP (ref 43–77)
PLATELET # BLD AUTO: 85 K/UL — LOW (ref 150–400)
RBC # BLD: 3.49 M/UL — LOW (ref 4.2–5.8)
RBC # FLD: 15.3 % — HIGH (ref 10.3–14.5)
TM INTERPRETATION: SIGNIFICANT CHANGE UP
WBC # BLD: 8.9 K/UL — SIGNIFICANT CHANGE UP (ref 3.8–10.5)
WBC # FLD AUTO: 8.9 K/UL — SIGNIFICANT CHANGE UP (ref 3.8–10.5)

## 2019-10-01 ENCOUNTER — APPOINTMENT (OUTPATIENT)
Dept: INFUSION THERAPY | Facility: HOSPITAL | Age: 59
End: 2019-10-01

## 2019-10-01 DIAGNOSIS — Z01.818 ENCOUNTER FOR OTHER PREPROCEDURAL EXAMINATION: ICD-10-CM

## 2019-10-01 PROCEDURE — 77334 RADIATION TREATMENT AID(S): CPT | Mod: 26

## 2019-10-01 PROCEDURE — 77300 RADIATION THERAPY DOSE PLAN: CPT | Mod: 26

## 2019-10-02 ENCOUNTER — APPOINTMENT (OUTPATIENT)
Dept: INFUSION THERAPY | Facility: HOSPITAL | Age: 59
End: 2019-10-02

## 2019-10-03 ENCOUNTER — INPATIENT (INPATIENT)
Facility: HOSPITAL | Age: 59
LOS: 28 days | Discharge: HOME | DRG: 14 | End: 2019-11-01
Attending: INTERNAL MEDICINE | Admitting: INTERNAL MEDICINE
Payer: COMMERCIAL

## 2019-10-03 VITALS
WEIGHT: 179.68 LBS | HEART RATE: 78 BPM | DIASTOLIC BLOOD PRESSURE: 84 MMHG | SYSTOLIC BLOOD PRESSURE: 141 MMHG | TEMPERATURE: 96 F | RESPIRATION RATE: 18 BRPM | HEIGHT: 71.18 IN

## 2019-10-03 DIAGNOSIS — Z29.9 ENCOUNTER FOR PROPHYLACTIC MEASURES, UNSPECIFIED: ICD-10-CM

## 2019-10-03 DIAGNOSIS — I26.99 OTHER PULMONARY EMBOLISM WITHOUT ACUTE COR PULMONALE: ICD-10-CM

## 2019-10-03 DIAGNOSIS — Z76.82 AWAITING ORGAN TRANSPLANT STATUS: ICD-10-CM

## 2019-10-03 DIAGNOSIS — Z86.79 PERSONAL HISTORY OF OTHER DISEASES OF THE CIRCULATORY SYSTEM: ICD-10-CM

## 2019-10-03 DIAGNOSIS — C93.00 ACUTE MONOBLASTIC/MONOCYTIC LEUKEMIA, NOT HAVING ACHIEVED REMISSION: ICD-10-CM

## 2019-10-03 LAB
ALBUMIN SERPL ELPH-MCNC: 3.8 G/DL — SIGNIFICANT CHANGE UP (ref 3.3–5)
ALP SERPL-CCNC: 69 U/L — SIGNIFICANT CHANGE UP (ref 40–120)
ALT FLD-CCNC: 21 U/L — SIGNIFICANT CHANGE UP (ref 10–45)
ANION GAP SERPL CALC-SCNC: 9 MMOL/L — SIGNIFICANT CHANGE UP (ref 5–17)
ANISOCYTOSIS BLD QL: SLIGHT — SIGNIFICANT CHANGE UP
APTT BLD: 24.3 SEC — LOW (ref 27.5–36.3)
AST SERPL-CCNC: 46 U/L — HIGH (ref 10–40)
BASOPHILS # BLD AUTO: 0 K/UL — SIGNIFICANT CHANGE UP (ref 0–0.2)
BASOPHILS NFR BLD AUTO: 0 % — SIGNIFICANT CHANGE UP (ref 0–2)
BILIRUB DIRECT SERPL-MCNC: <0.1 MG/DL — SIGNIFICANT CHANGE UP (ref 0–0.2)
BILIRUB INDIRECT FLD-MCNC: >0.1 MG/DL — LOW (ref 0.2–1)
BILIRUB SERPL-MCNC: 0.2 MG/DL — SIGNIFICANT CHANGE UP (ref 0.2–1.2)
BLD GP AB SCN SERPL QL: NEGATIVE — SIGNIFICANT CHANGE UP
BUN SERPL-MCNC: 19 MG/DL — SIGNIFICANT CHANGE UP (ref 7–23)
CALCIUM SERPL-MCNC: 8.8 MG/DL — SIGNIFICANT CHANGE UP (ref 8.4–10.5)
CHLORIDE SERPL-SCNC: 106 MMOL/L — SIGNIFICANT CHANGE UP (ref 96–108)
CO2 SERPL-SCNC: 22 MMOL/L — SIGNIFICANT CHANGE UP (ref 22–31)
CREAT SERPL-MCNC: 1.01 MG/DL — SIGNIFICANT CHANGE UP (ref 0.5–1.3)
DACRYOCYTES BLD QL SMEAR: SLIGHT — SIGNIFICANT CHANGE UP
EOSINOPHIL # BLD AUTO: 0.15 K/UL — SIGNIFICANT CHANGE UP (ref 0–0.5)
EOSINOPHIL NFR BLD AUTO: 1.8 % — SIGNIFICANT CHANGE UP (ref 0–6)
FIBRINOGEN PPP-MCNC: 299 MG/DL — LOW (ref 350–510)
GLUCOSE SERPL-MCNC: 97 MG/DL — SIGNIFICANT CHANGE UP (ref 70–99)
HCT VFR BLD CALC: 28.9 % — LOW (ref 39–50)
HGB BLD-MCNC: 9.9 G/DL — LOW (ref 13–17)
INR BLD: 0.92 RATIO — SIGNIFICANT CHANGE UP (ref 0.88–1.16)
LDH SERPL L TO P-CCNC: 519 U/L — HIGH (ref 50–242)
LYMPHOCYTES # BLD AUTO: 0.99 K/UL — LOW (ref 1–3.3)
LYMPHOCYTES # BLD AUTO: 11.8 % — LOW (ref 13–44)
MACROCYTES BLD QL: SLIGHT — SIGNIFICANT CHANGE UP
MAGNESIUM SERPL-MCNC: 1.9 MG/DL — SIGNIFICANT CHANGE UP (ref 1.6–2.6)
MANUAL SMEAR VERIFICATION: SIGNIFICANT CHANGE UP
MCHC RBC-ENTMCNC: 32.4 PG — SIGNIFICANT CHANGE UP (ref 27–34)
MCHC RBC-ENTMCNC: 34.3 GM/DL — SIGNIFICANT CHANGE UP (ref 32–36)
MCV RBC AUTO: 94.4 FL — SIGNIFICANT CHANGE UP (ref 80–100)
MONOCYTES # BLD AUTO: 0.91 K/UL — HIGH (ref 0–0.9)
MONOCYTES NFR BLD AUTO: 10.9 % — SIGNIFICANT CHANGE UP (ref 2–14)
MYELOCYTES NFR BLD: 1.8 % — HIGH (ref 0–0)
NEUTROPHILS # BLD AUTO: 5.78 K/UL — SIGNIFICANT CHANGE UP (ref 1.8–7.4)
NEUTROPHILS NFR BLD AUTO: 68.2 % — SIGNIFICANT CHANGE UP (ref 43–77)
NEUTS BAND # BLD: 0.9 % — SIGNIFICANT CHANGE UP (ref 0–8)
OVALOCYTES BLD QL SMEAR: SLIGHT — SIGNIFICANT CHANGE UP
PHOSPHATE SERPL-MCNC: 2.9 MG/DL — SIGNIFICANT CHANGE UP (ref 2.5–4.5)
PLAT MORPH BLD: NORMAL — SIGNIFICANT CHANGE UP
PLATELET # BLD AUTO: 63 K/UL — LOW (ref 150–400)
PLATELET # BLD AUTO: 73 K/UL — LOW (ref 150–400)
POIKILOCYTOSIS BLD QL AUTO: SLIGHT — SIGNIFICANT CHANGE UP
POLYCHROMASIA BLD QL SMEAR: SLIGHT — SIGNIFICANT CHANGE UP
POTASSIUM SERPL-MCNC: 4.7 MMOL/L — SIGNIFICANT CHANGE UP (ref 3.5–5.3)
POTASSIUM SERPL-SCNC: 4.7 MMOL/L — SIGNIFICANT CHANGE UP (ref 3.5–5.3)
PROT SERPL-MCNC: 6 G/DL — SIGNIFICANT CHANGE UP (ref 6–8.3)
PROTHROM AB SERPL-ACNC: 10.5 SEC — SIGNIFICANT CHANGE UP (ref 10–12.9)
RBC # BLD: 3.06 M/UL — LOW (ref 4.2–5.8)
RBC # BLD: 3.06 M/UL — LOW (ref 4.2–5.8)
RBC # FLD: 16.4 % — HIGH (ref 10.3–14.5)
RBC BLD AUTO: ABNORMAL
RETICS #: 56 K/UL — SIGNIFICANT CHANGE UP (ref 25–125)
RETICS/RBC NFR: 1.8 % — SIGNIFICANT CHANGE UP (ref 0.5–2.5)
RH IG SCN BLD-IMP: POSITIVE — SIGNIFICANT CHANGE UP
SODIUM SERPL-SCNC: 137 MMOL/L — SIGNIFICANT CHANGE UP (ref 135–145)
SP GR UR STRIP: 1.02 — SIGNIFICANT CHANGE UP (ref 1.01–1.02)
VARIANT LYMPHS # BLD: 4.6 % — SIGNIFICANT CHANGE UP (ref 0–6)
WBC # BLD: 8.36 K/UL — SIGNIFICANT CHANGE UP (ref 3.8–10.5)
WBC # FLD AUTO: 8.36 K/UL — SIGNIFICANT CHANGE UP (ref 3.8–10.5)

## 2019-10-03 PROCEDURE — 76937 US GUIDE VASCULAR ACCESS: CPT | Mod: 26

## 2019-10-03 PROCEDURE — 99291 CRITICAL CARE FIRST HOUR: CPT

## 2019-10-03 PROCEDURE — 36556 INSERT NON-TUNNEL CV CATH: CPT

## 2019-10-03 PROCEDURE — 77001 FLUOROGUIDE FOR VEIN DEVICE: CPT | Mod: 26

## 2019-10-03 PROCEDURE — 93010 ELECTROCARDIOGRAM REPORT: CPT

## 2019-10-03 RX ORDER — CYCLOPHOSPHAMIDE 100 MG
1111 VIAL (EA) INTRAVENOUS EVERY 24 HOURS
Refills: 0 | Status: COMPLETED | OUTPATIENT
Start: 2019-10-03 | End: 2019-10-05

## 2019-10-03 RX ORDER — SENNA PLUS 8.6 MG/1
1 TABLET ORAL AT BEDTIME
Refills: 0 | Status: DISCONTINUED | OUTPATIENT
Start: 2019-10-03 | End: 2019-10-05

## 2019-10-03 RX ORDER — CHLORHEXIDINE GLUCONATE 213 G/1000ML
1 SOLUTION TOPICAL
Refills: 0 | Status: DISCONTINUED | OUTPATIENT
Start: 2019-10-03 | End: 2019-11-01

## 2019-10-03 RX ORDER — SALIVA SUBSTITUTE COMB NO.11 351 MG
5 POWDER IN PACKET (EA) MUCOUS MEMBRANE
Refills: 0 | Status: DISCONTINUED | OUTPATIENT
Start: 2019-10-03 | End: 2019-11-01

## 2019-10-03 RX ORDER — METOCLOPRAMIDE HCL 10 MG
10 TABLET ORAL EVERY 6 HOURS
Refills: 0 | Status: DISCONTINUED | OUTPATIENT
Start: 2019-10-03 | End: 2019-11-01

## 2019-10-03 RX ORDER — SODIUM CHLORIDE 9 MG/ML
1000 INJECTION, SOLUTION INTRAVENOUS
Refills: 0 | Status: DISCONTINUED | OUTPATIENT
Start: 2019-10-03 | End: 2019-10-15

## 2019-10-03 RX ORDER — TAMSULOSIN HYDROCHLORIDE 0.4 MG/1
0.4 CAPSULE ORAL AT BEDTIME
Refills: 0 | Status: DISCONTINUED | OUTPATIENT
Start: 2019-10-03 | End: 2019-11-01

## 2019-10-03 RX ORDER — SODIUM CHLORIDE 9 MG/ML
1000 INJECTION INTRAMUSCULAR; INTRAVENOUS; SUBCUTANEOUS
Refills: 0 | Status: DISCONTINUED | OUTPATIENT
Start: 2019-10-03 | End: 2019-11-01

## 2019-10-03 RX ORDER — DIPHENHYDRAMINE HCL 50 MG
25 CAPSULE ORAL EVERY 4 HOURS
Refills: 0 | Status: DISCONTINUED | OUTPATIENT
Start: 2019-10-03 | End: 2019-11-01

## 2019-10-03 RX ORDER — ACETAMINOPHEN 500 MG
650 TABLET ORAL EVERY 6 HOURS
Refills: 0 | Status: DISCONTINUED | OUTPATIENT
Start: 2019-10-03 | End: 2019-11-01

## 2019-10-03 RX ORDER — SODIUM CHLORIDE 9 MG/ML
10 INJECTION INTRAMUSCULAR; INTRAVENOUS; SUBCUTANEOUS
Refills: 0 | Status: DISCONTINUED | OUTPATIENT
Start: 2019-10-03 | End: 2019-11-01

## 2019-10-03 RX ORDER — FINASTERIDE 5 MG/1
5 TABLET, FILM COATED ORAL DAILY
Refills: 0 | Status: DISCONTINUED | OUTPATIENT
Start: 2019-10-03 | End: 2019-11-01

## 2019-10-03 RX ORDER — FUROSEMIDE 40 MG
20 TABLET ORAL ONCE
Refills: 0 | Status: COMPLETED | OUTPATIENT
Start: 2019-10-03 | End: 2019-10-03

## 2019-10-03 RX ORDER — METOPROLOL TARTRATE 50 MG
12.5 TABLET ORAL
Refills: 0 | Status: DISCONTINUED | OUTPATIENT
Start: 2019-10-03 | End: 2019-11-01

## 2019-10-03 RX ORDER — ACYCLOVIR SODIUM 500 MG
400 VIAL (EA) INTRAVENOUS EVERY 8 HOURS
Refills: 0 | Status: DISCONTINUED | OUTPATIENT
Start: 2019-10-07 | End: 2019-11-01

## 2019-10-03 RX ORDER — APREPITANT 80 MG/1
80 CAPSULE ORAL EVERY 24 HOURS
Refills: 0 | Status: COMPLETED | OUTPATIENT
Start: 2019-10-04 | End: 2019-10-14

## 2019-10-03 RX ORDER — DOCUSATE SODIUM 100 MG
100 CAPSULE ORAL THREE TIMES A DAY
Refills: 0 | Status: DISCONTINUED | OUTPATIENT
Start: 2019-10-03 | End: 2019-10-05

## 2019-10-03 RX ORDER — PANTOPRAZOLE SODIUM 20 MG/1
40 TABLET, DELAYED RELEASE ORAL
Refills: 0 | Status: DISCONTINUED | OUTPATIENT
Start: 2019-10-03 | End: 2019-11-01

## 2019-10-03 RX ORDER — APREPITANT 80 MG/1
125 CAPSULE ORAL ONCE
Refills: 0 | Status: COMPLETED | OUTPATIENT
Start: 2019-10-03 | End: 2019-10-03

## 2019-10-03 RX ORDER — HEPARIN SODIUM 5000 [USP'U]/ML
339 INJECTION INTRAVENOUS; SUBCUTANEOUS
Qty: 25000 | Refills: 0 | Status: DISCONTINUED | OUTPATIENT
Start: 2019-10-03 | End: 2019-11-01

## 2019-10-03 RX ORDER — FOLIC ACID 0.8 MG
1 TABLET ORAL DAILY
Refills: 0 | Status: DISCONTINUED | OUTPATIENT
Start: 2019-10-03 | End: 2019-11-01

## 2019-10-03 RX ORDER — ONDANSETRON 8 MG/1
8 TABLET, FILM COATED ORAL EVERY 8 HOURS
Refills: 0 | Status: COMPLETED | OUTPATIENT
Start: 2019-10-03 | End: 2019-10-15

## 2019-10-03 RX ORDER — VORICONAZOLE 10 MG/ML
200 INJECTION, POWDER, LYOPHILIZED, FOR SOLUTION INTRAVENOUS EVERY 12 HOURS
Refills: 0 | Status: DISCONTINUED | OUTPATIENT
Start: 2019-10-03 | End: 2019-11-01

## 2019-10-03 RX ORDER — CLOTRIMAZOLE 10 MG
1 TROCHE MUCOUS MEMBRANE
Refills: 0 | Status: DISCONTINUED | OUTPATIENT
Start: 2019-10-03 | End: 2019-11-01

## 2019-10-03 RX ORDER — SERTRALINE 25 MG/1
50 TABLET, FILM COATED ORAL AT BEDTIME
Refills: 0 | Status: DISCONTINUED | OUTPATIENT
Start: 2019-10-03 | End: 2019-11-01

## 2019-10-03 RX ORDER — ALPRAZOLAM 0.25 MG
0.5 TABLET ORAL AT BEDTIME
Refills: 0 | Status: DISCONTINUED | OUTPATIENT
Start: 2019-10-03 | End: 2019-10-07

## 2019-10-03 RX ORDER — SODIUM BICARBONATE 1 MEQ/ML
10 SYRINGE (ML) INTRAVENOUS
Refills: 0 | Status: DISCONTINUED | OUTPATIENT
Start: 2019-10-03 | End: 2019-11-01

## 2019-10-03 RX ORDER — MESNA 100 MG/ML
978 INJECTION, SOLUTION INTRAVENOUS EVERY 24 HOURS
Refills: 0 | Status: COMPLETED | OUTPATIENT
Start: 2019-10-03 | End: 2019-10-05

## 2019-10-03 RX ORDER — URSODIOL 250 MG/1
300 TABLET, FILM COATED ORAL
Refills: 0 | Status: DISCONTINUED | OUTPATIENT
Start: 2019-10-03 | End: 2019-11-01

## 2019-10-03 RX ORDER — FLUDARABINE PHOSPHATE 25 MG/ML
59 INJECTION, SOLUTION INTRAVENOUS EVERY 24 HOURS
Refills: 0 | Status: COMPLETED | OUTPATIENT
Start: 2019-10-03 | End: 2019-10-07

## 2019-10-03 RX ADMIN — Medication 1 LOZENGE: at 23:16

## 2019-10-03 RX ADMIN — Medication 1 TABLET(S): at 18:15

## 2019-10-03 RX ADMIN — SODIUM CHLORIDE 200 MILLILITER(S): 9 INJECTION, SOLUTION INTRAVENOUS at 18:10

## 2019-10-03 RX ADMIN — TAMSULOSIN HYDROCHLORIDE 0.4 MILLIGRAM(S): 0.4 CAPSULE ORAL at 22:12

## 2019-10-03 RX ADMIN — ONDANSETRON 8 MILLIGRAM(S): 8 TABLET, FILM COATED ORAL at 18:10

## 2019-10-03 RX ADMIN — SODIUM CHLORIDE 20 MILLILITER(S): 9 INJECTION INTRAMUSCULAR; INTRAVENOUS; SUBCUTANEOUS at 18:08

## 2019-10-03 RX ADMIN — URSODIOL 300 MILLIGRAM(S): 250 TABLET, FILM COATED ORAL at 18:14

## 2019-10-03 RX ADMIN — Medication 5 MILLILITER(S): at 23:16

## 2019-10-03 RX ADMIN — APREPITANT 125 MILLIGRAM(S): 80 CAPSULE ORAL at 18:14

## 2019-10-03 RX ADMIN — VORICONAZOLE 200 MILLIGRAM(S): 10 INJECTION, POWDER, LYOPHILIZED, FOR SOLUTION INTRAVENOUS at 18:15

## 2019-10-03 RX ADMIN — Medication 5 MILLILITER(S): at 20:49

## 2019-10-03 RX ADMIN — Medication 12.5 MILLIGRAM(S): at 18:15

## 2019-10-03 RX ADMIN — Medication 10 MILLILITER(S): at 20:40

## 2019-10-03 RX ADMIN — Medication 10 MILLILITER(S): at 23:16

## 2019-10-03 RX ADMIN — SERTRALINE 50 MILLIGRAM(S): 25 TABLET, FILM COATED ORAL at 22:12

## 2019-10-03 RX ADMIN — Medication 1 LOZENGE: at 20:40

## 2019-10-03 RX ADMIN — HEPARIN SODIUM 3.39 UNIT(S)/HR: 5000 INJECTION INTRAVENOUS; SUBCUTANEOUS at 18:08

## 2019-10-03 RX ADMIN — FLUDARABINE PHOSPHATE 204.72 MILLIGRAM(S): 25 INJECTION, SOLUTION INTRAVENOUS at 20:35

## 2019-10-03 RX ADMIN — Medication 0.5 MILLIGRAM(S): at 22:12

## 2019-10-03 RX ADMIN — Medication 20 MILLIGRAM(S): at 22:50

## 2019-10-03 NOTE — ADVANCED PRACTICE NURSE CONSULT - ASSESSMENT
Pt alert & oriented x4, resting comfortable in bed. Right IJ TL cath, dressing dry & inatct. All port accessed and flushed, with positive blood return. flushes easily, Reviewed with patient his chemo regimen and well understood. Fludarabine 30mg/m2/=59mg.  started at 2000. Chemo was verify by 2 RNs for safety.

## 2019-10-03 NOTE — H&P ADULT - NSICDXPASTMEDICALHX_GEN_ALL_CORE_FT
PAST MEDICAL HISTORY:  Deep vein thrombosis (DVT)     Factor 5 Leiden mutation, heterozygous     H/O cardiomyopathy     COLLEEN (obstructive sleep apnea)     Pulmonary embolism

## 2019-10-03 NOTE — H&P ADULT - ATTENDING COMMENTS
59 year old male, with previously diagnosed acute myeloid leukemia. s/p induction chemotherapy with Daunorubicin/Cytarabine and  HIDAC consolidation chemotherapy, now admitted for Haplo-identical stem cell transplant from sister. Conditioning regimen of Fludarabine, Cytoxan, and TBI. Other history includes non-ischemic cardiomyopathy (recent EF 25% 9/19/19), COLLEEN, DVT/PE, HTN, Factor V Leiden and amputation of right toe due to osteomyelitis.  Today is day -6 of therapy.    Strict I&O, daily weights, prn diuresis   Will also get IVIG 0.4 g/kg (ideal body weight) every 3 weeks for GVHD ppx. Premedication with Tylenol and benadryl  TBI on day -1   Start Zarxio on day +5,  MMF and Tacrolimus. Check Tacrolimus levels on Monday and Thursday.   Anxiolytics, antinausea, antidiarrhea medications as needed   Lasix PRN while being aggressively hydrated to avoid VOD   Nutritional support, pain management.  hx non-ischemic CM, followed by Dr. Luke Lyn, last Echo 9/19 (EF 25%)  VOD prophylaxis - low dose heparin gtt (dosed at 100 units / kg / day), glutamine supplementation, Actigall BID   ID ppx - Bactrim DS through day -2 , Acyclovir 400mg po TID to start day -1, Diflucan 400 mg po daily.  GI prophylaxis - Protonix po QD   Kepivance for prevention of mucositis- days 0, +1, +2  Aggressive mouth care and skin care as per protocol.

## 2019-10-03 NOTE — H&P ADULT - PROBLEM SELECTOR PLAN 1
1. Admit to BMTU   2. TLC placement in IR   3. Day -6 - day + 5 - antiemetics   4. Day - 6 start CTX hydration - 1/2NS @ 200ml /hr    5. Strict I&O, daily weights, prn diuresis   6. Day -6 - day -2 - Fludarabine 30mg/m2   7. Day -6 - day - 5 - CTX 14.5mg/kg/day. CTX to start after urine SG < 1.010. Mesna  12mg / kg - hemorrhagic cystitis ppx   8. Day -2 - IVIG 0.4 g/kg (ideal body weight) every 3 weeks. Premedication with Tylenol and benadryl  9. Day -1 -  cGy x 1 dose   10. Day - 1 - at 2200 begin transplant hydration : 0.45Ns + 1 amp (50mEq) sodium bicarbonate at 150ml /hr; continue transplant hydration for 24 hours post infusion   11. Day 0 – HPC transplant   12. Day + 2 at 2200 - begin CTX hydration (0.45NS + 10 mEq KCl/ @150ml /m2  continue 24 hours post last dose of CTX   13. Day + 3 - + 4 - CTX 50mg/kg.day. Begin CTX when urine SG < 1.010. EKG daily. Mesna for hemorraghic cystitis ppx.   14. Day + 5 - start Zarxio,  MMF and Tacrolimus. Check Tacrolimus levels on Monday and Thursday.   15. Anxiolytics, antinausea, antidiarrhea medications as needed   16. Lasix PRN while being aggressively hydrated to avoid VOD   17. Nutritional support, pain management.

## 2019-10-03 NOTE — H&P ADULT - PROBLEM SELECTOR PLAN 3
1. VOD prophylaxis - low dose heparin gtt (dosed at 100 units / kg / day), glutamine supplementation, Actigall BID   2. PCP prophylaxis - Bactrim DS through day -2    3. Antiviral prophylaxis - Acyclovir 400mg po TID to start day -1   4. Antifungal prophylaxis- Diflucan 400 mg po daily.  5.. GI prophylaxis - Protonix po QD   6. Antibacterial prophylaxis - when ANC < 1000, start Cipro 500mg po BID. If becomes febrile, pan cx, CXR and change Cipro to Cefepime 2g IV q 8 hours. Continue until count recovery  7. Kepivance for prevention of mucositis- days 0, +1, +2  8. Aggressive mouth care and skin care as per protocol.

## 2019-10-03 NOTE — PROGRESS NOTE ADULT - SUBJECTIVE AND OBJECTIVE BOX
Interventional Radiology Procedure Note    Procedure: Right IJ 7 Macedonian triple lumen catheter placement and right arm PICC line removal    Indication: Stem cell transplant     Operators: Dr. Blackwell, IGLESIA French , Dr. Matta,     Anesthesia (type): 2% local lidocaine     Contrast: None     EBL: Minimal     Findings/Follow up Plan of Care: Recovery, Floors     Specimens Removed: Right arm PICC line     Implants: Right IJ 7 Macedonian triple lumen catheter placement     Complications: None     Condition/Disposition: Recovery, Floors     Please call Interventional Radiology x [phone] with any questions, concerns, or issues.

## 2019-10-03 NOTE — H&P ADULT - NSHPSOCIALHISTORY_GEN_ALL_CORE
Patient is  and lives with a significant other. He drank 6-8 drinks a day until dx of AML. Last drink was 3 days ago patient had 1 beer. Patient quit smoking on 4/4/19. He previously worked as a  but on disability at this time and has 4 grown children.

## 2019-10-03 NOTE — H&P ADULT - PROBLEM SELECTOR PLAN 2
non-ischemic CM, followed by Dr. Luke Lyn  last Echo 9/19 (EF 25%)  Cardiology to follow while hospitralized

## 2019-10-03 NOTE — H&P ADULT - ASSESSMENT
Mr. Delong is a 59 year old male, with previously diagnosed acute myeloid leukemia. s/p induction chemotherapy with Daunorubicin/Cytarabine and  HIDAC consolidation chemotherapy, now admitted for Haplo-identical stem cell transplant.  Other history includes non-ischemic cardiomyopathy (recent EF 25% 9/19/19), COLLEEN, DVT/PE, HTN, Factor V Leiden and amputation of right toe due to osteomyelitis. Mr. Delong is a 59 year old male, with previously diagnosed acute myeloid leukemia. s/p induction chemotherapy with Daunorubicin/Cytarabine and  HIDAC consolidation chemotherapy, now admitted for Haplo-identical stem cell transplant from sister. Conditioning regimen of Fludarabine, Cytoxan, and TBI. Other history includes non-ischemic cardiomyopathy (recent EF 25% 9/19/19), COLLEEN, DVT/PE, HTN, Factor V Leiden and amputation of right toe due to osteomyelitis.

## 2019-10-03 NOTE — H&P ADULT - HISTORY OF PRESENT ILLNESS
Mr. Delong is a 59 year old male, with previously diagnosed acute myeloid leukemia. s/p induction chemotherapy with Daunorubicin/Cytarabine and  HIDAC consolidation chemotherapy, now admitted for Haplo-identical stem cell transplant.  Other history includes non-ischemic cardiomyopathy (recent EF 25% 9/19/19), COLLEEN, DVT/PE, HTN, Factor V Leiden and amputation of right toe due to osteomyelitis. .    Patient was initially being followed by [facility] for factor V Leiden on Xarelto. Labs were normal at check up. Had increased SOB and had repeat labs done and was told to go to ER. Patient went to [facility] due to insurance reasons. He was diagnosed with AML and underwent treated with standard induction chemotherapy daunorubicin/cytarabine 7+3. Day 14 BM bx showed persistent disease and patient was reinduced with 5+2 at which time he developed a wound on the right tow with concern for osteomyelitis as well as right cheek fungal infection with aspergillosis and neutropenic fevers of 105. After hospitalization, patient was discharged to Subacute rehab for 3weeks. After discharge from rehab due to insurance reasons, the patient was seen at [facility] by Dr. Coelho. BM bx showed a CR and plan was made for consolidation chemo with HiDAc.    Patient was admitted for chemotherapy on 7/11/19 and chemotherapy was held for osteomyelitis of right great toe and was seen by ID and Podiatry and the patient was discharged home on 6 weeks of Ceftriaxone (to complete on 8/21/19). After follow up with podiatry the decision was made to readmit for amputation and then proceeded with Cycle 1 of HiDAc due to risk of relapse. Mr. Delong is a 59 year old male, with previously diagnosed acute myeloid leukemia. s/p induction chemotherapy with Daunorubicin/Cytarabine and  HIDAC consolidation chemotherapy, now admitted for Haplo-identical stem cell transplant from sister. Other history includes non-ischemic cardiomyopathy (recent EF 25% 9/19/19), COLLEEN, DVT/PE, HTN, Factor V Leiden and amputation of right toe due to osteomyelitis. .    Patient was initially being followed by [facility] for factor V Leiden on Xarelto. Labs were normal at check up. Had increased SOB and had repeat labs done and was told to go to ER. Patient went to [facility] due to insurance reasons. He was diagnosed with AML and underwent treated with standard induction chemotherapy daunorubicin/cytarabine 7+3. Day 14 BM bx showed persistent disease and patient was reinduced with 5+2 at which time he developed a wound on the right tow with concern for osteomyelitis as well as right cheek fungal infection with aspergillosis and neutropenic fevers of 105. After hospitalization, patient was discharged to Subacute rehab for 3weeks. After discharge from rehab due to insurance reasons, the patient was seen at [facility] by Dr. Coelho. BM bx showed a CR and plan was made for consolidation chemo with HiDAc.    Patient was admitted for chemotherapy on 7/11/19 and chemotherapy was held for osteomyelitis of right great toe and was seen by ID and Podiatry and the patient was discharged home on 6 weeks of Ceftriaxone (to complete on 8/21/19). After follow up with podiatry the decision was made to readmit for amputation and then proceeded with Cycle 1 of HIDAC due to risk of relapse.    Patient admitted today, s/p [facility] 9/30, 10/1/ and 10/2. Mr. Delong is a 59 year old male, with previously diagnosed acute myeloid leukemia. s/p induction chemotherapy with Daunorubicin/Cytarabine and  HIDAC consolidation chemotherapy, now admitted for Haplo-identical stem cell transplant from sister. Other history includes non-ischemic cardiomyopathy (recent EF 25% 9/19/19), COLLEEN, DVT/PE, HTN, Factor V Leiden and amputation of right toe due to osteomyelitis.     Patient was initially being followed by [facility] for factor V Leiden on Xarelto. Labs were normal at check up. Had increased SOB and had repeat labs done and was told to go to ER. Patient went to [facility] due to insurance reasons. He was diagnosed with AML and underwent treated with standard induction chemotherapy daunorubicin/cytarabine 7+3. Day 14 BM bx showed persistent disease and patient was reinduced with 5+2 at which time he developed a wound on the right tow with concern for osteomyelitis as well as right cheek fungal infection with aspergillosis and neutropenic fevers of 105. After hospitalization, patient was discharged to Subacute rehab for 3weeks. After discharge from rehab due to insurance reasons, the patient was seen at [facility] by Dr. Coelho. BM bx showed a CR and plan was made for consolidation chemo with HiDAc.    Patient was admitted for chemotherapy on 7/11/19 and chemotherapy was held for osteomyelitis of right great toe and was seen by ID and Podiatry and the patient was discharged home on 6 weeks of Ceftriaxone (to complete on 8/21/19). After follow up with podiatry the decision was made to readmit for amputation and then proceeded with Cycle 1 of HIDAC due to risk of relapse.    Patient admitted today, s/p Kepivance x 3 days 9/30, 10/1/ and 10/2. Patient only reports mild itching to forehead (from Kepivance).  Denies any pain, nausea, vomiting, diarrhea, chest pain, SOB. Last dose of Xarelto for PE/DVT was 9/25. Took all other regular AM meds this morning. Mr. Delong is a 59 year old male, with previously diagnosed acute myeloid leukemia. s/p induction chemotherapy with Daunorubicin/Cytarabine and  HIDAC consolidation chemotherapy, now admitted for Haplo-identical stem cell transplant from son. Other history includes non-ischemic cardiomyopathy (recent EF 25% 9/19/19), COLLEEN, DVT/PE, HTN, Factor V Leiden and amputation of right toe due to osteomyelitis.     Patient was initially being followed by [facility] for factor V Leiden on Xarelto. Labs were normal at check up. Had increased SOB and had repeat labs done and was told to go to ER. Patient went to [facility] due to insurance reasons. He was diagnosed with AML and underwent treated with standard induction chemotherapy daunorubicin/cytarabine 7+3. Day 14 BM bx showed persistent disease and patient was reinduced with 5+2 at which time he developed a wound on the right tow with concern for osteomyelitis as well as right cheek fungal infection with aspergillosis and neutropenic fevers of 105. After hospitalization, patient was discharged to Subacute rehab for 3weeks. After discharge from rehab due to insurance reasons, the patient was seen at [facility] by Dr. Coelho. BM bx showed a CR and plan was made for consolidation chemo with HiDAc.    Patient was admitted for chemotherapy on 7/11/19 and chemotherapy was held for osteomyelitis of right great toe and was seen by ID and Podiatry and the patient was discharged home on 6 weeks of Ceftriaxone (to complete on 8/21/19). After follow up with podiatry the decision was made to readmit for amputation and then proceeded with Cycle 1 of HIDAC due to risk of relapse.    Patient admitted today, s/p Kepivance x 3 days 9/30, 10/1/ and 10/2. Patient only reports mild itching to forehead (from Kepivance).  Denies any pain, nausea, vomiting, diarrhea, chest pain, SOB. Last dose of Xarelto for PE/DVT was 9/25. Took all other regular AM meds this morning.

## 2019-10-03 NOTE — PROGRESS NOTE ADULT - SUBJECTIVE AND OBJECTIVE BOX
59 year old male with a hx of acute myeloid leukemia now admitted for Haplo-identical stem cell transplant from sister referred to IR for TLC placement foe venous access.      Allergies: No Known Allergies      PAST MEDICAL & SURGICAL HISTORY:  COLLEEN (obstructive sleep apnea)  Pulmonary embolism  Deep vein thrombosis (DVT)  H/O cardiomyopathy  Factor 5 Leiden mutation, heterozygous        Pertinent labs:                      x      x     )-----------( 73       ( 03 Oct 2019 13:09 )             x        reviewed on HIE    Consent: Procedure/risks/ Benefits explained. Informed consent obtained. Pt verbalizes understanding. 59 year old male with a hx of acute myeloid leukemia now admitted for Haplo-identical stem cell transplant from sister referred to IR for TLC placement foe venous access and PICC removal.      Allergies: No Known Allergies      PAST MEDICAL & SURGICAL HISTORY:  COLLEEN (obstructive sleep apnea)  Pulmonary embolism  Deep vein thrombosis (DVT)  H/O cardiomyopathy  Factor 5 Leiden mutation, heterozygous        Pertinent labs:                      x      x     )-----------( 73       ( 03 Oct 2019 13:09 )             x        reviewed on HIE    Consent: Procedure/risks/ Benefits explained. Informed consent obtained. Pt verbalizes understanding.

## 2019-10-03 NOTE — H&P ADULT - REASON FOR ADMISSION
Allogenic stem cell transplant Haplo-identical peripheral blood stem cell transplant from his son for treatment of AML

## 2019-10-04 DIAGNOSIS — Z51.5 ENCOUNTER FOR PALLIATIVE CARE: ICD-10-CM

## 2019-10-04 DIAGNOSIS — C92.01 ACUTE MYELOBLASTIC LEUKEMIA, IN REMISSION: ICD-10-CM

## 2019-10-04 LAB
ALBUMIN SERPL ELPH-MCNC: 3.4 G/DL — SIGNIFICANT CHANGE UP (ref 3.3–5)
ALP SERPL-CCNC: 70 U/L — SIGNIFICANT CHANGE UP (ref 40–120)
ALT FLD-CCNC: 22 U/L — SIGNIFICANT CHANGE UP (ref 10–45)
ANION GAP SERPL CALC-SCNC: 12 MMOL/L — SIGNIFICANT CHANGE UP (ref 5–17)
APPEARANCE UR: CLEAR — SIGNIFICANT CHANGE UP
AST SERPL-CCNC: 34 U/L — SIGNIFICANT CHANGE UP (ref 10–40)
BASOPHILS # BLD AUTO: 0.02 K/UL — SIGNIFICANT CHANGE UP (ref 0–0.2)
BASOPHILS NFR BLD AUTO: 0.3 % — SIGNIFICANT CHANGE UP (ref 0–2)
BILIRUB DIRECT SERPL-MCNC: <0.1 MG/DL — SIGNIFICANT CHANGE UP (ref 0–0.2)
BILIRUB INDIRECT FLD-MCNC: >0.1 MG/DL — LOW (ref 0.2–1)
BILIRUB SERPL-MCNC: 0.2 MG/DL — SIGNIFICANT CHANGE UP (ref 0.2–1.2)
BILIRUB UR-MCNC: NEGATIVE — SIGNIFICANT CHANGE UP
BLD GP AB SCN SERPL QL: NEGATIVE — SIGNIFICANT CHANGE UP
BUN SERPL-MCNC: 17 MG/DL — SIGNIFICANT CHANGE UP (ref 7–23)
CALCIUM SERPL-MCNC: 8.5 MG/DL — SIGNIFICANT CHANGE UP (ref 8.4–10.5)
CHLORIDE SERPL-SCNC: 101 MMOL/L — SIGNIFICANT CHANGE UP (ref 96–108)
CO2 SERPL-SCNC: 21 MMOL/L — LOW (ref 22–31)
COLOR SPEC: SIGNIFICANT CHANGE UP
CREAT SERPL-MCNC: 1.19 MG/DL — SIGNIFICANT CHANGE UP (ref 0.5–1.3)
DIFF PNL FLD: NEGATIVE — SIGNIFICANT CHANGE UP
EOSINOPHIL # BLD AUTO: 0.13 K/UL — SIGNIFICANT CHANGE UP (ref 0–0.5)
EOSINOPHIL NFR BLD AUTO: 1.8 % — SIGNIFICANT CHANGE UP (ref 0–6)
GLUCOSE SERPL-MCNC: 103 MG/DL — HIGH (ref 70–99)
GLUCOSE UR QL: NEGATIVE — SIGNIFICANT CHANGE UP
HCT VFR BLD CALC: 27.1 % — LOW (ref 39–50)
HGB BLD-MCNC: 9.3 G/DL — LOW (ref 13–17)
IGA FLD-MCNC: 24 MG/DL — LOW (ref 84–499)
IGD SER-MCNC: <1 MG/DL — SIGNIFICANT CHANGE UP
IGE SERPL-ACNC: 181 KU/L — HIGH
IGG FLD-MCNC: 716 MG/DL — SIGNIFICANT CHANGE UP (ref 610–1660)
IGM SERPL-MCNC: 13 MG/DL — LOW (ref 35–242)
IMM GRANULOCYTES NFR BLD AUTO: 4 % — HIGH (ref 0–1.5)
KAPPA LC SER QL IFE: 0.97 MG/DL — SIGNIFICANT CHANGE UP (ref 0.33–1.94)
KAPPA/LAMBDA FREE LIGHT CHAIN RATIO, SERUM: 1.31 RATIO — SIGNIFICANT CHANGE UP (ref 0.26–1.65)
KETONES UR-MCNC: NEGATIVE — SIGNIFICANT CHANGE UP
LAMBDA LC SER QL IFE: 0.74 MG/DL — SIGNIFICANT CHANGE UP (ref 0.57–2.63)
LDH SERPL L TO P-CCNC: 281 U/L — HIGH (ref 50–242)
LEUKOCYTE ESTERASE UR-ACNC: NEGATIVE — SIGNIFICANT CHANGE UP
LYMPHOCYTES # BLD AUTO: 1.31 K/UL — SIGNIFICANT CHANGE UP (ref 1–3.3)
LYMPHOCYTES # BLD AUTO: 18.1 % — SIGNIFICANT CHANGE UP (ref 13–44)
MAGNESIUM SERPL-MCNC: 1.6 MG/DL — SIGNIFICANT CHANGE UP (ref 1.6–2.6)
MCHC RBC-ENTMCNC: 32.7 PG — SIGNIFICANT CHANGE UP (ref 27–34)
MCHC RBC-ENTMCNC: 34.3 GM/DL — SIGNIFICANT CHANGE UP (ref 32–36)
MCV RBC AUTO: 95.4 FL — SIGNIFICANT CHANGE UP (ref 80–100)
MONOCYTES # BLD AUTO: 0.84 K/UL — SIGNIFICANT CHANGE UP (ref 0–0.9)
MONOCYTES NFR BLD AUTO: 11.6 % — SIGNIFICANT CHANGE UP (ref 2–14)
NEUTROPHILS # BLD AUTO: 4.63 K/UL — SIGNIFICANT CHANGE UP (ref 1.8–7.4)
NEUTROPHILS NFR BLD AUTO: 64.2 % — SIGNIFICANT CHANGE UP (ref 43–77)
NITRITE UR-MCNC: NEGATIVE — SIGNIFICANT CHANGE UP
NRBC # BLD: 0 /100 WBCS — SIGNIFICANT CHANGE UP (ref 0–0)
PH UR: 6 — SIGNIFICANT CHANGE UP (ref 5–8)
PHOSPHATE SERPL-MCNC: 3.7 MG/DL — SIGNIFICANT CHANGE UP (ref 2.5–4.5)
PLATELET # BLD AUTO: 63 K/UL — LOW (ref 150–400)
POTASSIUM SERPL-MCNC: 3.7 MMOL/L — SIGNIFICANT CHANGE UP (ref 3.5–5.3)
POTASSIUM SERPL-SCNC: 3.7 MMOL/L — SIGNIFICANT CHANGE UP (ref 3.5–5.3)
PROT SERPL-MCNC: 5.3 G/DL — LOW (ref 6–8.3)
PROT UR-MCNC: NEGATIVE — SIGNIFICANT CHANGE UP
RBC # BLD: 2.84 M/UL — LOW (ref 4.2–5.8)
RBC # FLD: 16.7 % — HIGH (ref 10.3–14.5)
RH IG SCN BLD-IMP: POSITIVE — SIGNIFICANT CHANGE UP
SODIUM SERPL-SCNC: 134 MMOL/L — LOW (ref 135–145)
SP GR SPEC: 1.02 — SIGNIFICANT CHANGE UP (ref 1.01–1.02)
SP GR UR STRIP: 1 — LOW (ref 1.01–1.02)
SP GR UR STRIP: 1.01 — LOW (ref 1.01–1.02)
UROBILINOGEN FLD QL: SIGNIFICANT CHANGE UP
WBC # BLD: 7.22 K/UL — SIGNIFICANT CHANGE UP (ref 3.8–10.5)
WBC # FLD AUTO: 7.22 K/UL — SIGNIFICANT CHANGE UP (ref 3.8–10.5)

## 2019-10-04 PROCEDURE — 99291 CRITICAL CARE FIRST HOUR: CPT

## 2019-10-04 PROCEDURE — 99222 1ST HOSP IP/OBS MODERATE 55: CPT

## 2019-10-04 PROCEDURE — 99223 1ST HOSP IP/OBS HIGH 75: CPT

## 2019-10-04 RX ORDER — MAGNESIUM SULFATE 500 MG/ML
1 VIAL (ML) INJECTION ONCE
Refills: 0 | Status: COMPLETED | OUTPATIENT
Start: 2019-10-04 | End: 2019-10-04

## 2019-10-04 RX ORDER — TACROLIMUS 5 MG/1
1 CAPSULE ORAL
Refills: 0 | Status: DISCONTINUED | OUTPATIENT
Start: 2019-10-14 | End: 2019-10-17

## 2019-10-04 RX ORDER — FUROSEMIDE 40 MG
20 TABLET ORAL ONCE
Refills: 0 | Status: COMPLETED | OUTPATIENT
Start: 2019-10-04 | End: 2019-10-04

## 2019-10-04 RX ORDER — LIDOCAINE AND PRILOCAINE CREAM 25; 25 MG/G; MG/G
1 CREAM TOPICAL DAILY
Refills: 0 | Status: DISCONTINUED | OUTPATIENT
Start: 2019-10-14 | End: 2019-11-01

## 2019-10-04 RX ORDER — SODIUM CHLORIDE 9 MG/ML
1000 INJECTION, SOLUTION INTRAVENOUS
Refills: 0 | Status: DISCONTINUED | OUTPATIENT
Start: 2019-10-11 | End: 2019-10-15

## 2019-10-04 RX ORDER — ACETAMINOPHEN 500 MG
650 TABLET ORAL
Refills: 0 | Status: DISCONTINUED | OUTPATIENT
Start: 2019-10-07 | End: 2019-11-01

## 2019-10-04 RX ORDER — FUROSEMIDE 40 MG
40 TABLET ORAL ONCE
Refills: 0 | Status: COMPLETED | OUTPATIENT
Start: 2019-10-04 | End: 2019-10-04

## 2019-10-04 RX ORDER — SODIUM CHLORIDE 9 MG/ML
1000 INJECTION, SOLUTION INTRAVENOUS
Refills: 0 | Status: DISCONTINUED | OUTPATIENT
Start: 2019-10-08 | End: 2019-10-10

## 2019-10-04 RX ORDER — ACETAMINOPHEN 500 MG
650 TABLET ORAL ONCE
Refills: 0 | Status: COMPLETED | OUTPATIENT
Start: 2019-10-09 | End: 2019-10-09

## 2019-10-04 RX ORDER — MYCOPHENOLATE MOFETIL 250 MG/1
1000 CAPSULE ORAL
Refills: 0 | Status: DISCONTINUED | OUTPATIENT
Start: 2019-10-14 | End: 2019-11-01

## 2019-10-04 RX ORDER — POTASSIUM CHLORIDE 20 MEQ
40 PACKET (EA) ORAL ONCE
Refills: 0 | Status: COMPLETED | OUTPATIENT
Start: 2019-10-04 | End: 2019-10-04

## 2019-10-04 RX ORDER — IMMUNE GLOBULIN (HUMAN) 10 G/100ML
30 INJECTION INTRAVENOUS; SUBCUTANEOUS
Refills: 0 | Status: DISCONTINUED | OUTPATIENT
Start: 2019-10-07 | End: 2019-10-28

## 2019-10-04 RX ORDER — DIPHENHYDRAMINE HCL 50 MG
25 CAPSULE ORAL ONCE
Refills: 0 | Status: COMPLETED | OUTPATIENT
Start: 2019-10-09 | End: 2019-10-09

## 2019-10-04 RX ORDER — HYDROCORTISONE 20 MG
50 TABLET ORAL ONCE
Refills: 0 | Status: COMPLETED | OUTPATIENT
Start: 2019-10-09 | End: 2019-10-09

## 2019-10-04 RX ORDER — PALIFERMIN 6.25 MG
4860 VIAL (EA) INTRAVENOUS EVERY 24 HOURS
Refills: 0 | Status: COMPLETED | OUTPATIENT
Start: 2019-10-09 | End: 2019-10-11

## 2019-10-04 RX ORDER — FILGRASTIM 480MCG/1.6
480 VIAL (ML) INJECTION DAILY
Refills: 0 | Status: COMPLETED | OUTPATIENT
Start: 2019-10-14 | End: 2019-10-29

## 2019-10-04 RX ORDER — DIPHENHYDRAMINE HCL 50 MG
25 CAPSULE ORAL
Refills: 0 | Status: DISCONTINUED | OUTPATIENT
Start: 2019-10-07 | End: 2019-10-28

## 2019-10-04 RX ADMIN — Medication 10 MILLILITER(S): at 11:04

## 2019-10-04 RX ADMIN — Medication 10 MILLILITER(S): at 15:40

## 2019-10-04 RX ADMIN — APREPITANT 80 MILLIGRAM(S): 80 CAPSULE ORAL at 05:40

## 2019-10-04 RX ADMIN — SODIUM CHLORIDE 20 MILLILITER(S): 9 INJECTION INTRAMUSCULAR; INTRAVENOUS; SUBCUTANEOUS at 17:38

## 2019-10-04 RX ADMIN — Medication 1 MILLIGRAM(S): at 11:05

## 2019-10-04 RX ADMIN — Medication 100 GRAM(S): at 11:05

## 2019-10-04 RX ADMIN — Medication 1 TABLET(S): at 11:05

## 2019-10-04 RX ADMIN — Medication 311.1 MILLIGRAM(S): at 02:00

## 2019-10-04 RX ADMIN — SODIUM CHLORIDE 200 MILLILITER(S): 9 INJECTION, SOLUTION INTRAVENOUS at 17:37

## 2019-10-04 RX ADMIN — Medication 1 LOZENGE: at 15:40

## 2019-10-04 RX ADMIN — SODIUM CHLORIDE 200 MILLILITER(S): 9 INJECTION, SOLUTION INTRAVENOUS at 00:00

## 2019-10-04 RX ADMIN — SERTRALINE 50 MILLIGRAM(S): 25 TABLET, FILM COATED ORAL at 21:26

## 2019-10-04 RX ADMIN — ONDANSETRON 8 MILLIGRAM(S): 8 TABLET, FILM COATED ORAL at 17:38

## 2019-10-04 RX ADMIN — Medication 1 TABLET(S): at 05:40

## 2019-10-04 RX ADMIN — Medication 10 MILLILITER(S): at 20:13

## 2019-10-04 RX ADMIN — ONDANSETRON 8 MILLIGRAM(S): 8 TABLET, FILM COATED ORAL at 09:46

## 2019-10-04 RX ADMIN — ONDANSETRON 8 MILLIGRAM(S): 8 TABLET, FILM COATED ORAL at 02:00

## 2019-10-04 RX ADMIN — Medication 20 MILLIGRAM(S): at 15:39

## 2019-10-04 RX ADMIN — MESNA 239.12 MILLIGRAM(S): 100 INJECTION, SOLUTION INTRAVENOUS at 01:30

## 2019-10-04 RX ADMIN — Medication 5 MILLILITER(S): at 15:40

## 2019-10-04 RX ADMIN — Medication 40 MILLIEQUIVALENT(S): at 11:06

## 2019-10-04 RX ADMIN — Medication 1 TABLET(S): at 17:38

## 2019-10-04 RX ADMIN — SODIUM CHLORIDE 20 MILLILITER(S): 9 INJECTION INTRAMUSCULAR; INTRAVENOUS; SUBCUTANEOUS at 05:39

## 2019-10-04 RX ADMIN — URSODIOL 300 MILLIGRAM(S): 250 TABLET, FILM COATED ORAL at 08:03

## 2019-10-04 RX ADMIN — TAMSULOSIN HYDROCHLORIDE 0.4 MILLIGRAM(S): 0.4 CAPSULE ORAL at 21:26

## 2019-10-04 RX ADMIN — Medication 5 MILLILITER(S): at 11:05

## 2019-10-04 RX ADMIN — HEPARIN SODIUM 3.39 UNIT(S)/HR: 5000 INJECTION INTRAVENOUS; SUBCUTANEOUS at 17:38

## 2019-10-04 RX ADMIN — CHLORHEXIDINE GLUCONATE 1 APPLICATION(S): 213 SOLUTION TOPICAL at 05:44

## 2019-10-04 RX ADMIN — Medication 1 LOZENGE: at 20:13

## 2019-10-04 RX ADMIN — Medication 40 MILLIGRAM(S): at 11:05

## 2019-10-04 RX ADMIN — Medication 10 MILLILITER(S): at 08:04

## 2019-10-04 RX ADMIN — URSODIOL 300 MILLIGRAM(S): 250 TABLET, FILM COATED ORAL at 17:38

## 2019-10-04 RX ADMIN — Medication 0.5 MILLIGRAM(S): at 21:26

## 2019-10-04 RX ADMIN — FINASTERIDE 5 MILLIGRAM(S): 5 TABLET, FILM COATED ORAL at 11:05

## 2019-10-04 RX ADMIN — VORICONAZOLE 200 MILLIGRAM(S): 10 INJECTION, POWDER, LYOPHILIZED, FOR SOLUTION INTRAVENOUS at 05:40

## 2019-10-04 RX ADMIN — Medication 1 LOZENGE: at 11:04

## 2019-10-04 RX ADMIN — Medication 5 MILLILITER(S): at 21:07

## 2019-10-04 RX ADMIN — Medication 1 LOZENGE: at 08:04

## 2019-10-04 RX ADMIN — Medication 5 MILLILITER(S): at 08:04

## 2019-10-04 RX ADMIN — PANTOPRAZOLE SODIUM 40 MILLIGRAM(S): 20 TABLET, DELAYED RELEASE ORAL at 05:40

## 2019-10-04 RX ADMIN — VORICONAZOLE 200 MILLIGRAM(S): 10 INJECTION, POWDER, LYOPHILIZED, FOR SOLUTION INTRAVENOUS at 17:38

## 2019-10-04 RX ADMIN — Medication 12.5 MILLIGRAM(S): at 20:13

## 2019-10-04 RX ADMIN — FLUDARABINE PHOSPHATE 204.72 MILLIGRAM(S): 25 INJECTION, SOLUTION INTRAVENOUS at 20:02

## 2019-10-04 NOTE — CONSULT NOTE ADULT - SUBJECTIVE AND OBJECTIVE BOX
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 Palliative called to follow for symptoms post-transplant.    PERTINENT PM/SXH:   COLLEEN (obstructive sleep apnea)  Pulmonary embolism  Deep vein thrombosis (DVT)  H/O cardiomyopathy  Factor 5 Leiden mutation, heterozygous    FAMILY HISTORY:    ITEMS NOT CHECKED ARE NOT PRESENT    SOCIAL HISTORY:   Significant other/partner:  [ but with significant other]    Children:  [ ]    Hinduism/Spirituality: Yarsanism but non-Jewish  Substance hx:  [ ]   Tobacco hx:  [ X]   Alcohol hx: [ ] Drug use hx: [X ] marijuana  Home Opioid hx:  [ ] (I-Stop Reference No: [id number])  Living Situation: [X ]Home  [ ]Long term care  [ ]Rehab [ ]Other  Occupation:    ADVANCE DIRECTIVES:    DNR [ ]  MOLST  [ ]    Living Will  [ ]   DECISION MAKER(s):  [X ] Health Care Proxy(s)  [ ] Surrogate(s)  [ ] Guardian           Name(s) and Contact(s): Ashlyn (significant other)    BASELINE (I)ADL(s) (prior to admission):  [place]: [X ]Total  [ ] Moderate [ ]Dependent    Allergies    No Known Allergies    Intolerances    MEDICATIONS  (STANDING):  acetaminophen   Tablet .. 650 milliGRAM(s) Oral every 6 hours  ALPRAZolam 0.5 milliGRAM(s) Oral at bedtime  aprepitant 80 milliGRAM(s) Oral every 24 hours  Biotene Dry Mouth Oral Rinse 5 milliLiter(s) Swish and Spit five times a day  chlorhexidine 4% Liquid 1 Application(s) Topical <User Schedule>  clotrimazole Lozenge 1 Lozenge Oral five times a day  cyclophosphamide IVPB (eMAR) 1111 milliGRAM(s) IV Intermittent every 24 hours  enalapril 2.5 milliGRAM(s) Oral daily  finasteride 5 milliGRAM(s) Oral daily  fludarabine IVPB (eMAR) 59 milliGRAM(s) IV Intermittent every 24 hours  folic acid 1 milliGRAM(s) Oral daily  heparin  Infusion 339 Unit(s)/Hr (3.39 mL/Hr) IV Continuous <Continuous>  mesna IVPB (eMAR) 978 milliGRAM(s) IV Intermittent every 24 hours  metoprolol succinate ER 12.5 milliGRAM(s) Oral two times a day  multivitamin 1 Tablet(s) Oral daily  ondansetron Injectable 8 milliGRAM(s) IV Push every 8 hours  pantoprazole    Tablet 40 milliGRAM(s) Oral before breakfast  sertraline 50 milliGRAM(s) Oral at bedtime  sodium bicarbonate Mouth Rinse 10 milliLiter(s) Swish and Spit five times a day  sodium chloride 0.45%. 1000 milliLiter(s) (200 mL/Hr) IV Continuous <Continuous>  sodium chloride 0.9%. 1000 milliLiter(s) (20 mL/Hr) IV Continuous <Continuous>  tamsulosin 0.4 milliGRAM(s) Oral at bedtime  trimethoprim  160 mG/sulfamethoxazole 800 mG 1 Tablet(s) Oral every 12 hours  ursodiol Capsule 300 milliGRAM(s) Oral two times a day with meals  voriconazole 200 milliGRAM(s) Oral every 12 hours    MEDICATIONS  (PRN):  acetaminophen   Tablet .. 650 milliGRAM(s) Oral every 6 hours PRN Temp greater or equal to 38C (100.4F), Mild Pain (1 - 3)  diphenhydrAMINE   Injectable 25 milliGRAM(s) IV Push every 4 hours PRN Allergy symptoms  docusate sodium 100 milliGRAM(s) Oral three times a day PRN Constipation  metoclopramide Injectable 10 milliGRAM(s) IV Push every 6 hours PRN Nausea and/or Vomiting  senna 1 Tablet(s) Oral at bedtime PRN Constipation  sodium chloride 0.9% lock flush 10 milliLiter(s) IV Push every 1 hour PRN Pre/post blood products, medications, blood draw, and to maintain line patency    PRESENT SYMPTOMS:   Source if other than patient:  [ ]Family   [ ]Team     Pain (Impact on QOL):    Location -         Minimal acceptable level (0-10 scale):                    Aggravating factors -  Quality -  Radiation -  Severity (0-10 scale) -    Timing -    PAIN AD Score:     [email or web address]/[email or web address] (press ctrl +  left click to view)    Dyspnea:                           [ ]Mild [ ]Moderate [ ]Severe  Anxiety:                             [ ]Mild [ ]Moderate [ ]Severe  Fatigue:                             [ ]Mild [ ]Moderate [ ]Severe  Nausea:                             [ ]Mild [ ]Moderate [ ]Severe  Loss of appetite:              [ ]Mild [ ]Moderate [ ]Severe  Constipation:                    [ ]Mild [ ]Moderate [ ]Severe    Other Symptoms:  [X ]All other review of systems negative   [ ]Unable to obtain due to poor mentation     Karnofsky Performance Score/Palliative Performance Status Version 2:  70+%    PHYSICAL EXAM:  Vital Signs Last 24 Hrs  T(C): 36.4 (04 Oct 2019 14:14), Max: 36.6 (03 Oct 2019 21:07)  T(F): 97.5 (04 Oct 2019 14:14), Max: 97.9 (03 Oct 2019 21:07)  HR: 71 (04 Oct 2019 14:14) (68 - 81)  BP: 108/65 (04 Oct 2019 14:14) (99/55 - 143/83)  BP(mean): --  RR: 18 (04 Oct 2019 14:14) (18 - 18)  SpO2: 99% (04 Oct 2019 14:14) (94% - 99%) I&O's Summary    03 Oct 2019 07:01  -  04 Oct 2019 07:00  --------------------------------------------------------  IN: 2998.9 mL / OUT: 1650 mL / NET: 1348.9 mL    04 Oct 2019 07:01  -  04 Oct 2019 16:20  --------------------------------------------------------  IN: 3241.1 mL / OUT: 4250 mL / NET: -1008.9 mL        GENERAL:  [X ]Alert  [ ]Oriented x   [ ]Lethargic  [ ]Cachexia  [ ]Unarousable  [X ]Verbal  [ ]Non-Verbal    Behavioral:   [ ] Anxiety  [ ] Delirium [ ] Agitation [ ] Other    HEENT:  [X ]Normal   [ ]Dry mouth   [ ]ET Tube/Trach  [ ]Oral lesions    PULMONARY:   [X ]Clear [ ]Tachypnea  [ ]Audible excessive secretions   [ ]Rhonchi        [ ]Right [ ]Left [ ]Bilateral  [ ]Crackles        [ ]Right [ ]Left [ ]Bilateral  [ ]Wheezing     [ ]Right [ ]Left [ ]Bilateral    CARDIOVASCULAR:    [ X]Regular [ ]Irregular [ ]Tachy  [ ]Maximino [ ]Murmur [ ]Other    GASTROINTESTINAL:  [ X]Soft  [ ]Distended   [X ]+BS  [ ]Non tender [ ]Tender  [ ]PEG [ ]OGT/ NGT  Last BM: 10/3/19    GENITOURINARY:  [ ]Normal [ ] Incontinent   [ ]Oliguria/Anuria   [ ]Padilla    MUSCULOSKELETAL:   [ ]Normal   [ ]Weakness  [ ]Bed/Wheelchair bound [ ]Edema    NEUROLOGIC:   [ X]No focal deficits  [ ] Cognitive impairment  [ ] Dysphagia [ ]Dysarthria [ ] Paresis [ ]Other     SKIN:   [X ]Normal   [ ]Pressure ulcer(s)  [ ]Rash    CRITICAL CARE:  [ ] Shock Present  [ ]Septic [ ]Cardiogenic [ ]Neurologic [ ]Hypovolemic  [ ]  Vasopressors [ ]  Inotropes   [ ] Respiratory failure present  [ ] Acute  [ ] Chronic [ ] Hypoxic  [ ] Hypercarbic [ ] Other  [ ] Other organ failure     LABS: reviewed                        9.3    7.22  )-----------( 63       ( 04 Oct 2019 07:35 )             27.1   10-04    134<L>  |  101  |  17  ----------------------------<  103<H>  3.7   |  21<L>  |  1.19    Ca    8.5      04 Oct 2019 07:34  Phos  3.7     10-04  Mg     1.6     10-04    TPro  5.3<L>  /  Alb  3.4  /  TBili  0.2  /  DBili  <0.1  /  AST  34  /  ALT  22  /  AlkPhos  70  10-04  PT/INR - ( 03 Oct 2019 18:01 )   PT: 10.5 sec;   INR: 0.92 ratio         PTT - ( 03 Oct 2019 18:01 )  PTT:24.3 sec    Urinalysis Basic - ( 03 Oct 2019 23:37 )    Color: Light Yellow / Appearance: Clear / S.017 / pH: x  Gluc: x / Ketone: Negative  / Bili: Negative / Urobili: <2 mg/dL   Blood: x / Protein: Negative / Nitrite: Negative   Leuk Esterase: Negative / RBC: x / WBC x   Sq Epi: x / Non Sq Epi: x / Bacteria: x      RADIOLOGY & ADDITIONAL STUDIES: no imaging for review    PROTEIN CALORIE MALNUTRITION:   [ ] PPSV2 < or = to 30% [ ] significant weight loss  [ ] poor nutritional intake [ ] catabolic state [ ] anasarca     Albumin, Serum: 3.4 g/dL (10-04-19 @ 07:34)  Artificial Nutrition [ ]     REFERRALS:   [ ]Chaplaincy  [ ] Hospice  [ ]Child Life  [ ]Social Work  [ ]Case management [ ]Holistic Therapy     Goals of Care Discussion Document:     ........ ....... ...... ..... .... ... .. .  Jairo Benjamin MD  Palliative Medicine  office: [phone] | [phone]  pager: [phone]

## 2019-10-04 NOTE — CONSULT NOTE ADULT - PROBLEM SELECTOR RECOMMENDATION 9
- s/p induction and consolidation regimens  - pre-BMT regimen per BMT team  - denies any symptoms at this time

## 2019-10-04 NOTE — PROGRESS NOTE ADULT - SUBJECTIVE AND OBJECTIVE BOX
HPC Transplant Team                                                      Critical / Counseling Time Provided: 30 minutes                                                                                                                                                        Chief Complaint: Allogenic stem cell transplant    S: Patient seen and examined with HPC Transplant Team:   Denies mouth / tongue / throat pain, dyspnea, cough, nausea, vomiting, diarrhea, abdominal pain     O: Vitals:   Vital Signs Last 24 Hrs  T(C): 36.3 (04 Oct 2019 05:53), Max: 36.6 (03 Oct 2019 21:07)  T(F): 97.4 (04 Oct 2019 05:53), Max: 97.9 (03 Oct 2019 21:07)  HR: 80 (04 Oct 2019 05:53) (68 - 81)  BP: 101/59 (04 Oct 2019 05:53) (99/55 - 143/83)  BP(mean): --  RR: 18 (04 Oct 2019 05:53) (18 - 18)  SpO2: 94% (04 Oct 2019 05:53) (94% - 97%)    Admit weight:   Daily Height in cm: 180.8 (03 Oct 2019 12:16)    Daily     Intake / Output:   10-03 @ 07:01  -  10-04 @ 07:00  --------------------------------------------------------  IN: 2998.9 mL / OUT: 1650 mL / NET: 1348.9 mL    10-04 @ 07:01  -  10-04 @ 08:12  --------------------------------------------------------  IN: 310.7 mL / OUT: 0 mL / NET: 310.7 mL          PE:   Oropharynx:   Oral Mucositis:                                                        Grade:   CVS:   Lungs:   Abdomen:  Extremities:   Gastric Mucositis:                                                  Grade:   Intestinal Mucositis:                                              Grade:   Skin:   TLC:   Neuro:   Pain:     Labs:   CBC Full  -  ( 03 Oct 2019 18:01 )  WBC Count : 8.36 K/uL  Hemoglobin : 9.9 g/dL  Hematocrit : 28.9 %  Platelet Count - Automated : 63 K/uL  Mean Cell Volume : 94.4 fl  Mean Cell Hemoglobin : 32.4 pg  Mean Cell Hemoglobin Concentration : 34.3 gm/dL  Auto Neutrophil # : 5.78 K/uL  Auto Lymphocyte # : 0.99 K/uL  Auto Monocyte # : 0.91 K/uL  Auto Eosinophil # : 0.15 K/uL  Auto Basophil # : 0.00 K/uL  Auto Neutrophil % : 68.2 %  Auto Lymphocyte % : 11.8 %  Auto Monocyte % : 10.9 %  Auto Eosinophil % : 1.8 %  Auto Basophil % : 0.0 %                          9.9    8.36  )-----------( 63       ( 03 Oct 2019 18:01 )             28.9     10-03    137  |  106  |  19  ----------------------------<  97  4.7   |  22  |  1.01    Ca    8.8      03 Oct 2019 18:01  Phos  2.9     10-03  Mg     1.9     10-03    TPro  6.0  /  Alb  3.8  /  TBili  0.2  /  DBili  <0.1  /  AST  46<H>  /  ALT  21  /  AlkPhos  69  10-03    PT/INR - ( 03 Oct 2019 18:01 )   PT: 10.5 sec;   INR: 0.92 ratio         PTT - ( 03 Oct 2019 18:01 )  PTT:24.3 sec  LIVER FUNCTIONS - ( 03 Oct 2019 18:01 )  Alb: 3.8 g/dL / Pro: 6.0 g/dL / ALK PHOS: 69 U/L / ALT: 21 U/L / AST: 46 U/L / GGT: x           Lactate Dehydrogenase, Serum: 519 U/L (10-03 @ 18:01)          Karnofsky / Lansky Scale:   GVHD:   Skin:   Liver:   Gut:   Overall Grade:       Cultures:         Radiology:       Meds:   Antimicrobials:   clotrimazole Lozenge 1 Lozenge Oral five times a day  trimethoprim  160 mG/sulfamethoxazole 800 mG 1 Tablet(s) Oral every 12 hours  voriconazole 200 milliGRAM(s) Oral every 12 hours      Heme / Onc:   cyclophosphamide IVPB (eMAR) 1111 milliGRAM(s) IV Intermittent every 24 hours  fludarabine IVPB (eMAR) 59 milliGRAM(s) IV Intermittent every 24 hours  heparin  Infusion 339 Unit(s)/Hr IV Continuous <Continuous>  mesna IVPB (eMAR) 978 milliGRAM(s) IV Intermittent every 24 hours      GI:  docusate sodium 100 milliGRAM(s) Oral three times a day PRN  pantoprazole    Tablet 40 milliGRAM(s) Oral before breakfast  senna 1 Tablet(s) Oral at bedtime PRN  sodium bicarbonate Mouth Rinse 10 milliLiter(s) Swish and Spit five times a day  ursodiol Capsule 300 milliGRAM(s) Oral two times a day with meals      Cardiovascular:   enalapril 2.5 milliGRAM(s) Oral daily  metoprolol succinate ER 12.5 milliGRAM(s) Oral two times a day  tamsulosin 0.4 milliGRAM(s) Oral at bedtime      Immunologic:       Other medications:   acetaminophen   Tablet .. 650 milliGRAM(s) Oral every 6 hours  ALPRAZolam 0.5 milliGRAM(s) Oral at bedtime  aprepitant 80 milliGRAM(s) Oral every 24 hours  Biotene Dry Mouth Oral Rinse 5 milliLiter(s) Swish and Spit five times a day  chlorhexidine 4% Liquid 1 Application(s) Topical <User Schedule>  finasteride 5 milliGRAM(s) Oral daily  folic acid 1 milliGRAM(s) Oral daily  multivitamin 1 Tablet(s) Oral daily  ondansetron Injectable 8 milliGRAM(s) IV Push every 8 hours  sertraline 50 milliGRAM(s) Oral at bedtime  sodium chloride 0.45%. 1000 milliLiter(s) IV Continuous <Continuous>  sodium chloride 0.9%. 1000 milliLiter(s) IV Continuous <Continuous>      PRN:   acetaminophen   Tablet .. 650 milliGRAM(s) Oral every 6 hours PRN  diphenhydrAMINE   Injectable 25 milliGRAM(s) IV Push every 4 hours PRN  docusate sodium 100 milliGRAM(s) Oral three times a day PRN  metoclopramide Injectable 10 milliGRAM(s) IV Push every 6 hours PRN  senna 1 Tablet(s) Oral at bedtime PRN  sodium chloride 0.9% lock flush 10 milliLiter(s) IV Push every 1 hour PRN      Mr. Delong is a 59 year old male, with previously diagnosed acute myeloid leukemia. s/p induction chemotherapy with Daunorubicin/Cytarabine and  HIDAC consolidation chemotherapy, now admitted for Haplo-identical stem cell transplant from sister. Conditioning regimen of Fludarabine, Cytoxan, and TBI. Other history includes non-ischemic cardiomyopathy (recent EF 25% 9/19/19), COLLEEN, DVT/PE, HTN, Factor V Leiden and amputation of right toe due to osteomyelitis.     1. Infectious Disease:       2. VOD Prophylaxis: Actigall, Glutamine, Heparin (dosed at 100 units / kg / day)     3. GI Prophylaxis:  Protonix    4. Mouthcare - NS / NaHCO3 rinses, Mycelex, Caphosol; Skin care     5. GVHD prophylaxis     6. Transfuse & replete electrolytes prn     7. IV hydration, daily weights, strict I&O, prn diuresis     8. PO intake as tolerated, nutrition follow up as needed, MVI, folic acid     9. Antiemetics, anti-diarrhea medications:        10. OOB as tolerated, physical therapy consult if needed     11. Monitor coags / fibrinogen 2x week, vitamin K as needed     12. Monitor closely for clinical changes, monitor for fevers     13. Emotional support provided, plan of care discussed and questions addressed     14. Patient education done regarding chemotherapy prep, plan of care, restrictions and discharge planning     15. Continue regular social work input     I have written the above note for Dr. Bradley Goldberg  who performed service with me in the room.   Brittney Mcmahon NP ([phone])    I have seen and examined patient with NP, I agree with above note as scribed. HPC Transplant Team                                                      Critical / Counseling Time Provided: 30 minutes                                                                                                                                                        Chief Complaint: Allogenic stem cell transplant    S: Patient seen and examined with HPC Transplant Team:     no complaint   Denies mouth / tongue / throat pain, dyspnea, cough, nausea, vomiting, diarrhea, abdominal pain     O: Vitals:   Vital Signs Last 24 Hrs  T(C): 36.3 (04 Oct 2019 05:53), Max: 36.6 (03 Oct 2019 21:07)  T(F): 97.4 (04 Oct 2019 05:53), Max: 97.9 (03 Oct 2019 21:07)  HR: 80 (04 Oct 2019 05:53) (68 - 81)  BP: 101/59 (04 Oct 2019 05:53) (99/55 - 143/83)  BP(mean): --  RR: 18 (04 Oct 2019 05:53) (18 - 18)  SpO2: 94% (04 Oct 2019 05:53) (94% - 97%)    Admit weight:   Daily Height in cm: 180.8 (03 Oct 2019 12:16)    Daily     Intake / Output:   10-03 @ 07:01  -  10-04 @ 07:00  --------------------------------------------------------  IN: 2998.9 mL / OUT: 1650 mL / NET: 1348.9 mL    10-04 @ 07:01  -  10-04 @ 08:12  --------------------------------------------------------  IN: 310.7 mL / OUT: 0 mL / NET: 310.7 mL          PE:   Oropharynx: no ulceration or sore  Oral Mucositis:      none                                                  Grade:   CVS: reg S1 S2  Lungs: CTA  Abdomen: soft, + BS, NT, ND  Extremities: No C/C/E  Gastric Mucositis:     none                                             Grade:   Intestinal Mucositis:      none                                        Grade:   Skin: no rash  TLC:  C/D/I  Neuro:   Pain:     Labs:   CBC Full  -  ( 03 Oct 2019 18:01 )  WBC Count : 8.36 K/uL  Hemoglobin : 9.9 g/dL  Hematocrit : 28.9 %  Platelet Count - Automated : 63 K/uL  Mean Cell Volume : 94.4 fl  Mean Cell Hemoglobin : 32.4 pg  Mean Cell Hemoglobin Concentration : 34.3 gm/dL  Auto Neutrophil # : 5.78 K/uL  Auto Lymphocyte # : 0.99 K/uL  Auto Monocyte # : 0.91 K/uL  Auto Eosinophil # : 0.15 K/uL  Auto Basophil # : 0.00 K/uL  Auto Neutrophil % : 68.2 %  Auto Lymphocyte % : 11.8 %  Auto Monocyte % : 10.9 %  Auto Eosinophil % : 1.8 %  Auto Basophil % : 0.0 %                          9.9    8.36  )-----------( 63       ( 03 Oct 2019 18:01 )             28.9     10-03    137  |  106  |  19  ----------------------------<  97  4.7   |  22  |  1.01    Ca    8.8      03 Oct 2019 18:01  Phos  2.9     10-03  Mg     1.9     10-03    TPro  6.0  /  Alb  3.8  /  TBili  0.2  /  DBili  <0.1  /  AST  46<H>  /  ALT  21  /  AlkPhos  69  10-03    PT/INR - ( 03 Oct 2019 18:01 )   PT: 10.5 sec;   INR: 0.92 ratio         PTT - ( 03 Oct 2019 18:01 )  PTT:24.3 sec  LIVER FUNCTIONS - ( 03 Oct 2019 18:01 )  Alb: 3.8 g/dL / Pro: 6.0 g/dL / ALK PHOS: 69 U/L / ALT: 21 U/L / AST: 46 U/L / GGT: x           Lactate Dehydrogenase, Serum: 519 U/L (10-03 @ 18:01)          Karnofsky / Lansky Scale:   GVHD:   Skin:   Liver:   Gut:   Overall Grade:       Cultures:         Radiology:       Meds:   Antimicrobials:   clotrimazole Lozenge 1 Lozenge Oral five times a day  trimethoprim  160 mG/sulfamethoxazole 800 mG 1 Tablet(s) Oral every 12 hours  voriconazole 200 milliGRAM(s) Oral every 12 hours      Heme / Onc:   cyclophosphamide IVPB (eMAR) 1111 milliGRAM(s) IV Intermittent every 24 hours  fludarabine IVPB (eMAR) 59 milliGRAM(s) IV Intermittent every 24 hours  heparin  Infusion 339 Unit(s)/Hr IV Continuous <Continuous>  mesna IVPB (eMAR) 978 milliGRAM(s) IV Intermittent every 24 hours      GI:  docusate sodium 100 milliGRAM(s) Oral three times a day PRN  pantoprazole    Tablet 40 milliGRAM(s) Oral before breakfast  senna 1 Tablet(s) Oral at bedtime PRN  sodium bicarbonate Mouth Rinse 10 milliLiter(s) Swish and Spit five times a day  ursodiol Capsule 300 milliGRAM(s) Oral two times a day with meals      Cardiovascular:   enalapril 2.5 milliGRAM(s) Oral daily  metoprolol succinate ER 12.5 milliGRAM(s) Oral two times a day  tamsulosin 0.4 milliGRAM(s) Oral at bedtime      Immunologic:       Other medications:   acetaminophen   Tablet .. 650 milliGRAM(s) Oral every 6 hours  ALPRAZolam 0.5 milliGRAM(s) Oral at bedtime  aprepitant 80 milliGRAM(s) Oral every 24 hours  Biotene Dry Mouth Oral Rinse 5 milliLiter(s) Swish and Spit five times a day  chlorhexidine 4% Liquid 1 Application(s) Topical <User Schedule>  finasteride 5 milliGRAM(s) Oral daily  folic acid 1 milliGRAM(s) Oral daily  multivitamin 1 Tablet(s) Oral daily  ondansetron Injectable 8 milliGRAM(s) IV Push every 8 hours  sertraline 50 milliGRAM(s) Oral at bedtime  sodium chloride 0.45%. 1000 milliLiter(s) IV Continuous <Continuous>  sodium chloride 0.9%. 1000 milliLiter(s) IV Continuous <Continuous>      PRN:   acetaminophen   Tablet .. 650 milliGRAM(s) Oral every 6 hours PRN  diphenhydrAMINE   Injectable 25 milliGRAM(s) IV Push every 4 hours PRN  docusate sodium 100 milliGRAM(s) Oral three times a day PRN  metoclopramide Injectable 10 milliGRAM(s) IV Push every 6 hours PRN  senna 1 Tablet(s) Oral at bedtime PRN  sodium chloride 0.9% lock flush 10 milliLiter(s) IV Push every 1 hour PRN      Mr. Delong is a 59 year old male, with previously diagnosed acute myeloid leukemia. s/p induction chemotherapy with Daunorubicin/Cytarabine and  HIDAC consolidation chemotherapy, now admitted for Haplo-identical stem cell transplant from sister. Conditioning regimen of Fludarabine, Cytoxan, and TBI. Other history includes non-ischemic cardiomyopathy (recent EF 25% 9/19/19), COLLEEN, DVT/PE, HTN, Factor V Leiden and amputation of right toe due to osteomyelitis.     1. Infectious Disease:       2. VOD Prophylaxis: Actigall, Glutamine, Heparin (dosed at 100 units / kg / day)     3. GI Prophylaxis:  Protonix    4. Mouthcare - NS / NaHCO3 rinses, Mycelex, Caphosol; Skin care     5. GVHD prophylaxis     6. Transfuse & replete electrolytes prn     7. IV hydration, daily weights, strict I&O, prn diuresis     8. PO intake as tolerated, nutrition follow up as needed, MVI, folic acid     9. Antiemetics, anti-diarrhea medications:        10. OOB as tolerated, physical therapy consult if needed     11. Monitor coags / fibrinogen 2x week, vitamin K as needed     12. Monitor closely for clinical changes, monitor for fevers     13. Emotional support provided, plan of care discussed and questions addressed     14. Patient education done regarding chemotherapy prep, plan of care, restrictions and discharge planning     15. Continue regular social work input     I have written the above note for Dr. Bradley Goldberg  who performed service with me in the room.   Brittney Mcmahon NP ([phone])    I have seen and examined patient with NP, I agree with above note as scribed. HPC Transplant Team                                                      Critical / Counseling Time Provided: 30 minutes                                                                                                                                                        Chief Complaint: Allogenic stem cell transplant    S: Patient seen and examined with HPC Transplant Team:     no complaint   Denies mouth / tongue / throat pain, dyspnea, cough, nausea, vomiting, diarrhea, abdominal pain     O: Vitals:   Vital Signs Last 24 Hrs  T(C): 36.3 (04 Oct 2019 05:53), Max: 36.6 (03 Oct 2019 21:07)  T(F): 97.4 (04 Oct 2019 05:53), Max: 97.9 (03 Oct 2019 21:07)  HR: 80 (04 Oct 2019 05:53) (68 - 81)  BP: 101/59 (04 Oct 2019 05:53) (99/55 - 143/83)  BP(mean): --  RR: 18 (04 Oct 2019 05:53) (18 - 18)  SpO2: 94% (04 Oct 2019 05:53) (94% - 97%)    Admit weight:   Daily Height in cm: 180.8 (03 Oct 2019 12:16)    Daily 82 today    Intake / Output:   10-03 @ 07:01  -  10-04 @ 07:00  --------------------------------------------------------  IN: 2998.9 mL / OUT: 1650 mL / NET: 1348.9 mL    10-04 @ 07:01  -  10-04 @ 08:12  --------------------------------------------------------  IN: 310.7 mL / OUT: 0 mL / NET: 310.7 mL          PE:   Oropharynx: no ulceration or sore  Oral Mucositis:      none                                                  Grade:   CVS: reg S1 S2  Lungs: CTA  Abdomen: soft, + BS, NT, ND  Extremities: No C/C/E  Gastric Mucositis:     none                                             Grade:   Intestinal Mucositis:      none                                        Grade:   Skin: no rash  TLC:  C/D/I  Neuro:   Pain: none          LABS:                      9.3    7.22  )-----------( 63       ( 04 Oct 2019 07:35 )             27.1         Mean Cell Volume : 95.4 fl  Mean Cell Hemoglobin : 32.7 pg  Mean Cell Hemoglobin Concentration : 34.3 gm/dL  Auto Neutrophil # : 4.63 K/uL  Auto Lymphocyte # : 1.31 K/uL  Auto Monocyte # : 0.84 K/uL  Auto Eosinophil # : 0.13 K/uL  Auto Basophil # : 0.02 K/uL  Auto Neutrophil % : 64.2 %  Auto Lymphocyte % : 18.1 %  Auto Monocyte % : 11.6 %  Auto Eosinophil % : 1.8 %  Auto Basophil % : 0.3 %      10-04    134<L>  |  101  |  17  ----------------------------<  103<H>  3.7   |  21<L>  |  1.19    Ca    8.5      04 Oct 2019 07:34  Phos  3.7     10-04  Mg     1.6     10-04    TPro  5.3<L>  /  Alb  3.4  /  TBili  0.2  /  DBili  <0.1  /  AST  34  /  ALT  22  /  AlkPhos  70  10-04      PT/INR - ( 03 Oct 2019 18:01 )   PT: 10.5 sec;   INR: 0.92 ratio         PTT - ( 03 Oct 2019 18:01 )  PTT:24.3 sec      Uric Acid --  Specific Gravity by Meter, Urine (10.04.19 @ 01:07)    Specific Gravity by Meter, Urine: 1.007          Meds:   Antimicrobials:   clotrimazole Lozenge 1 Lozenge Oral five times a day  trimethoprim  160 mG/sulfamethoxazole 800 mG 1 Tablet(s) Oral every 12 hours  voriconazole 200 milliGRAM(s) Oral every 12 hours      Heme / Onc:   cyclophosphamide IVPB (eMAR) 1111 milliGRAM(s) IV Intermittent every 24 hours  fludarabine IVPB (eMAR) 59 milliGRAM(s) IV Intermittent every 24 hours  heparin  Infusion 339 Unit(s)/Hr IV Continuous <Continuous>  mesna IVPB (eMAR) 978 milliGRAM(s) IV Intermittent every 24 hours      GI:  docusate sodium 100 milliGRAM(s) Oral three times a day PRN  pantoprazole    Tablet 40 milliGRAM(s) Oral before breakfast  senna 1 Tablet(s) Oral at bedtime PRN  sodium bicarbonate Mouth Rinse 10 milliLiter(s) Swish and Spit five times a day  ursodiol Capsule 300 milliGRAM(s) Oral two times a day with meals      Cardiovascular:   enalapril 2.5 milliGRAM(s) Oral daily  metoprolol succinate ER 12.5 milliGRAM(s) Oral two times a day  tamsulosin 0.4 milliGRAM(s) Oral at bedtime      Immunologic:       Other medications:   acetaminophen   Tablet .. 650 milliGRAM(s) Oral every 6 hours  ALPRAZolam 0.5 milliGRAM(s) Oral at bedtime  aprepitant 80 milliGRAM(s) Oral every 24 hours  Biotene Dry Mouth Oral Rinse 5 milliLiter(s) Swish and Spit five times a day  chlorhexidine 4% Liquid 1 Application(s) Topical <User Schedule>  finasteride 5 milliGRAM(s) Oral daily  folic acid 1 milliGRAM(s) Oral daily  multivitamin 1 Tablet(s) Oral daily  ondansetron Injectable 8 milliGRAM(s) IV Push every 8 hours  sertraline 50 milliGRAM(s) Oral at bedtime  sodium chloride 0.45%. 1000 milliLiter(s) IV Continuous <Continuous>  sodium chloride 0.9%. 1000 milliLiter(s) IV Continuous <Continuous>      PRN:   acetaminophen   Tablet .. 650 milliGRAM(s) Oral every 6 hours PRN  diphenhydrAMINE   Injectable 25 milliGRAM(s) IV Push every 4 hours PRN  docusate sodium 100 milliGRAM(s) Oral three times a day PRN  metoclopramide Injectable 10 milliGRAM(s) IV Push every 6 hours PRN  senna 1 Tablet(s) Oral at bedtime PRN  sodium chloride 0.9% lock flush 10 milliLiter(s) IV Push every 1 hour PRN      Mr. Delong is a 59 year old male, with previously diagnosed acute myeloid leukemia. s/p induction chemotherapy with Daunorubicin/Cytarabine and  HIDAC consolidation chemotherapy, now admitted for Haplo-identical stem cell transplant Day -5 today   Conditioning regimen of Fludarabine, Cytoxan, and TBI.   Other history includes non-ischemic cardiomyopathy (recent EF 25% 9/19/19), COLLEEN, DVT/PE, HTN, Factor V Leiden and amputation of right toe due to osteomyelitis.   Lasix PRN for weight gain    1. Infectious Disease:   clotrimazole Lozenge 1 Lozenge Oral five times a day  trimethoprim  160 mG/sulfamethoxazole 800 mG 1 Tablet(s) Oral every 12 hours  voriconazole 200 milliGRAM(s) Oral every 12 hours      2. VOD Prophylaxis: Actigall, Glutamine, Heparin (dosed at 100 units / kg / day)     3. GI Prophylaxis:  Protonix    4. Mouthcare - NS / NaHCO3 rinses, Mycelex, Biotene; Skin care     5. GVHD prophylaxis     6. Transfuse & replete electrolytes prn   Lasix 40 mg iv x1 this am; 20 mg at 2pm.   KCL to prevent hypokalemia  Mg sulfate 1 g IVPB x1 for mild hypomagnesemia   7. IV hydration, daily weights, strict I&O, prn diuresis     8. PO intake as tolerated, nutrition follow up as needed, MVI, folic acid     9. Antiemetics, anti-diarrhea medications:        10. OOB as tolerated, physical therapy consult if needed     11. Monitor coags / fibrinogen 2x week, vitamin K as needed     12. Monitor closely for clinical changes, monitor for fevers     13. Emotional support provided, plan of care discussed and questions addressed     14. Patient education done regarding chemotherapy prep, plan of care, restrictions and discharge planning     15. Continue regular social work input     I have written the above note for Dr. Bradley Goldberg  who performed service with me in the room.   Brittney Mcmahon NP ([phone])    I have seen and examined patient with NP, I agree with above note as scribed.

## 2019-10-04 NOTE — DIETITIAN INITIAL EVALUATION ADULT. - PHYSICAL APPEARANCE
Nutrition focused physical exam conducted with verbal consent from patient, pt with no overt muscle mass or body fat depletion at this time, pt with some depression to temporal region which has been stable, no changes in appearance or functional status per pt.

## 2019-10-04 NOTE — PROGRESS NOTE ADULT - ATTENDING COMMENTS
59 year old male, with previously diagnosed acute myeloid leukemia. s/p induction chemotherapy with Daunorubicin/Cytarabine and  HIDAC consolidation chemotherapy, now admitted for Haplo-identical stem cell transplant from sister. Conditioning regimen of Fludarabine, Cytoxan, and TBI. Other history includes non-ischemic cardiomyopathy (recent EF 25% 9/19/19), COLLEEN, DVT/PE, HTN, Factor V Leiden and amputation of right toe due to osteomyelitis.  Today is day -5 of therapy.    Strict I&O, daily weights, prn diuresis   Will also get IVIG 0.4 g/kg (ideal body weight) every 3 weeks for GVHD ppx. Premedication with Tylenol and benadryl  TBI on day -1   Start Zarxio on day +5,  MMF and Tacrolimus. Check Tacrolimus levels on Monday and Thursday.   Anxiolytics, antinausea, antidiarrhea medications as needed   Lasix PRN while being aggressively hydrated to avoid VOD   Nutritional support, pain management.  hx non-ischemic CM, followed by Dr. Luke Lyn, last Echo 9/19 (EF 25%)  VOD prophylaxis - low dose heparin gtt (dosed at 100 units / kg / day), glutamine supplementation, Actigall BID   ID ppx - Bactrim DS through day -2 , Acyclovir 400mg po TID to start day -1, Diflucan 400 mg po daily.  GI prophylaxis - Protonix po QD   Kepivance for prevention of mucositis- days 0, +1, +2  Aggressive mouth care and skin care as per protocol.

## 2019-10-04 NOTE — CONSULT NOTE ADULT - ASSESSMENT
59M with PMH of COLLEEN, DVT/PE, HTN, Factor V Leiden, R toe amputation from OM, and AML s/p induction (with Daunorubicin/Cytarabine) and consolidation (with HIDAC), now here for allogenic BMT. Palliative called to follow for symptoms post-transplant.

## 2019-10-04 NOTE — ADVANCED PRACTICE NURSE CONSULT - ASSESSMENT
Pt alert & oriented x4, resting comfortable in bed. Right IJ TL cath, dressing dry & inatct. All port accessed and flushed, with positive blood return. flushes easily, Reviewed with patient his chemo regimen and well understood.   Cytoxan 14.5mg/kg/day= 1111mg, in 100ml. started at 0200. Chemo was verify by 2 RNs for safety.

## 2019-10-04 NOTE — CONSULT NOTE ADULT - PROBLEM SELECTOR RECOMMENDATION 3
- no symptoms at this time  - HCP in chart  - chaplaincy declined when offered by SW  - will follow post-transplant for symptoms, or as needed in the interim

## 2019-10-04 NOTE — CONSULT NOTE ADULT - SUBJECTIVE AND OBJECTIVE BOX
[facility] - Ede Zamudio, Riki, Edgar, Gem, Tabitha Garcia  Office Number: [phone]    Initial Consult Note  Patient well known to our practice.    CHIEF COMPLAINT: Patient is a 59y old  Male who presents with a chief complaint of Allogenic stem cell transplant        HPI:  Mr. Delong is a 59 year old male, with previously diagnosed acute myeloid leukemia. s/p induction chemotherapy with Daunorubicin/Cytarabine and  HIDAC consolidation chemotherapy, now admitted for Haplo-identical stem cell transplant from son. Other history includes non-ischemic cardiomyopathy (recent EF 25% 9/19/19), COLLEEN, DVT/PE, HTN, Factor V Leiden and amputation of right toe due to osteomyelitis.     Patient was initially being followed by [facility] for factor V Leiden on Xarelto. Labs were normal at check up. Had increased SOB and had repeat labs done and was told to go to ER. Patient went to [facility] hospital due to insurance reasons. He was diagnosed with AML and underwent treated with standard induction chemotherapy daunorubicin/cytarabine 7+3. Day 14 BM bx showed persistent disease and patient was reinduced with 5+2 at which time he developed a wound on the right tow with concern for osteomyelitis as well as right cheek fungal infection with aspergillosis and neutropenic fevers of 105. After hospitalization, patient was discharged to Subacute rehab for 3weeks. After discharge from rehab due to insurance reasons, [place] [facility] [place]. BM bx showed a CR and plan was made for consolidation chemo with HiDAc.    Patient was admitted for chemotherapy on 7/11/19 and chemotherapy was held for osteomyelitis of right great toe and was seen by ID and Podiatry and the patient was discharged home on 6 weeks of Ceftriaxone (to complete on 8/21/19). After follow up with podiatry the decision was made to readmit for amputation and then proceeded with Cycle 1 of HIDAC due to risk of relapse.    Patient admitted today, s/p Kepivance x 3 days 9/30, 10/1/ and 10/2. Patient only reports mild itching to forehead (from Kepivance).  Denies any pain, nausea, vomiting, diarrhea, chest pain, SOB. Last dose of Xarelto for PE/DVT was 9/25. Took all other regular AM meds this morning.     This morning, patient seen and examined.  He reports no increased dyspnea, PND, orthopnea, or LE swelling.  He has no anginal chest pain.  He is making good urine.  No palpitations, dizziness, or syncope.        PAST MEDICAL & SURGICAL HISTORY:  COLLEEN (obstructive sleep apnea)  Pulmonary embolism  Deep vein thrombosis (DVT)  H/O cardiomyopathy  Factor 5 Leiden mutation, heterozygous      SOCIAL HISTORY:  No tobacco, ethanol, or drug abuse.    FAMILY HISTORY:    No family history of acute MI or sudden cardiac death.    MEDICATIONS  (STANDING):  acetaminophen   Tablet .. 650 milliGRAM(s) Oral every 6 hours  ALPRAZolam 0.5 milliGRAM(s) Oral at bedtime  aprepitant 80 milliGRAM(s) Oral every 24 hours  Biotene Dry Mouth Oral Rinse 5 milliLiter(s) Swish and Spit five times a day  chlorhexidine 4% Liquid 1 Application(s) Topical <User Schedule>  clotrimazole Lozenge 1 Lozenge Oral five times a day  cyclophosphamide IVPB (eMAR) 1111 milliGRAM(s) IV Intermittent every 24 hours  enalapril 2.5 milliGRAM(s) Oral daily  finasteride 5 milliGRAM(s) Oral daily  fludarabine IVPB (eMAR) 59 milliGRAM(s) IV Intermittent every 24 hours  folic acid 1 milliGRAM(s) Oral daily  heparin  Infusion 339 Unit(s)/Hr (3.39 mL/Hr) IV Continuous <Continuous>  mesna IVPB (eMAR) 978 milliGRAM(s) IV Intermittent every 24 hours  metoprolol succinate ER 12.5 milliGRAM(s) Oral two times a day  multivitamin 1 Tablet(s) Oral daily  ondansetron Injectable 8 milliGRAM(s) IV Push every 8 hours  pantoprazole    Tablet 40 milliGRAM(s) Oral before breakfast  sertraline 50 milliGRAM(s) Oral at bedtime  sodium bicarbonate Mouth Rinse 10 milliLiter(s) Swish and Spit five times a day  sodium chloride 0.45%. 1000 milliLiter(s) (200 mL/Hr) IV Continuous <Continuous>  sodium chloride 0.9%. 1000 milliLiter(s) (20 mL/Hr) IV Continuous <Continuous>  tamsulosin 0.4 milliGRAM(s) Oral at bedtime  trimethoprim  160 mG/sulfamethoxazole 800 mG 1 Tablet(s) Oral every 12 hours  ursodiol Capsule 300 milliGRAM(s) Oral two times a day with meals  voriconazole 200 milliGRAM(s) Oral every 12 hours    MEDICATIONS  (PRN):  acetaminophen   Tablet .. 650 milliGRAM(s) Oral every 6 hours PRN Temp greater or equal to 38C (100.4F), Mild Pain (1 - 3)  diphenhydrAMINE   Injectable 25 milliGRAM(s) IV Push every 4 hours PRN Allergy symptoms  docusate sodium 100 milliGRAM(s) Oral three times a day PRN Constipation  metoclopramide Injectable 10 milliGRAM(s) IV Push every 6 hours PRN Nausea and/or Vomiting  senna 1 Tablet(s) Oral at bedtime PRN Constipation  sodium chloride 0.9% lock flush 10 milliLiter(s) IV Push every 1 hour PRN Pre/post blood products, medications, blood draw, and to maintain line patency      Allergies    No Known Allergies             REVIEW OF SYSTEMS:       All other review of systems is negative unless indicated above    VITAL SIGNS:   Vital Signs Last 24 Hrs  T(C): 36.3 (04 Oct 2019 05:53), Max: 36.6 (03 Oct 2019 21:07)  T(F): 97.4 (04 Oct 2019 05:53), Max: 97.9 (03 Oct 2019 21:07)  HR: 80 (04 Oct 2019 05:53) (68 - 81)  BP: 101/59 (04 Oct 2019 05:53) (99/55 - 143/83)  BP(mean): --  RR: 18 (04 Oct 2019 05:53) (18 - 18)  SpO2: 94% (04 Oct 2019 05:53) (94% - 97%)    I&O's Summary    03 Oct 2019 07:01  -  04 Oct 2019 07:00  --------------------------------------------------------  IN: 2998.9 mL / OUT: 1650 mL / NET: 1348.9 mL        On Exam:    Constitutional: NAD, alert and oriented x 3  Lungs:  Non-labored, breath sounds are clear bilaterally, No wheezing, rales or rhonchi  Cardiovascular: RRR.  S1 and S2 positive.  No murmurs, rubs, gallops or clicks  Gastrointestinal: Bowel Sounds present, soft, nontender.   Lymph: No peripheral edema. No cervical lymphadenopathy.  Neurological: Alert, no focal deficits  Skin: No rashes or ulcers   Psych:  Mood & affect appropriate.    LABS: All Labs Reviewed:                        9.9    8.36  )-----------( 63       ( 03 Oct 2019 18:01 )             28.9                         x      x     )-----------( 73       ( 03 Oct 2019 13:09 )             x        03 Oct 2019 18:01    137    |  106    |  19     ----------------------------<  97     4.7     |  22     |  1.01     Ca    8.8        03 Oct 2019 18:01  Phos  2.9       03 Oct 2019 18:01  Mg     1.9       03 Oct 2019 18:01    TPro  6.0    /  Alb  3.8    /  TBili  0.2    /  DBili  <0.1   /  AST  46     /  ALT  21     /  AlkPhos  69     03 Oct 2019 18:01    PT/INR - ( 03 Oct 2019 18:01 )   PT: 10.5 sec;   INR: 0.92 ratio         PTT - ( 03 Oct 2019 18:01 )  PTT:24.3 sec

## 2019-10-04 NOTE — DIETITIAN INITIAL EVALUATION ADULT. - PERTINENT MEDS FT
MEDICATIONS  (STANDING):  acetaminophen   Tablet .. 650 milliGRAM(s) Oral every 6 hours  ALPRAZolam 0.5 milliGRAM(s) Oral at bedtime  aprepitant 80 milliGRAM(s) Oral every 24 hours  Biotene Dry Mouth Oral Rinse 5 milliLiter(s) Swish and Spit five times a day  chlorhexidine 4% Liquid 1 Application(s) Topical <User Schedule>  clotrimazole Lozenge 1 Lozenge Oral five times a day  cyclophosphamide IVPB (eMAR) 1111 milliGRAM(s) IV Intermittent every 24 hours  enalapril 2.5 milliGRAM(s) Oral daily  finasteride 5 milliGRAM(s) Oral daily  fludarabine IVPB (eMAR) 59 milliGRAM(s) IV Intermittent every 24 hours  folic acid 1 milliGRAM(s) Oral daily  furosemide   Injectable 20 milliGRAM(s) IV Push once  heparin  Infusion 339 Unit(s)/Hr (3.39 mL/Hr) IV Continuous <Continuous>  mesna IVPB (eMAR) 978 milliGRAM(s) IV Intermittent every 24 hours  metoprolol succinate ER 12.5 milliGRAM(s) Oral two times a day  multivitamin 1 Tablet(s) Oral daily  ondansetron Injectable 8 milliGRAM(s) IV Push every 8 hours  pantoprazole    Tablet 40 milliGRAM(s) Oral before breakfast  sertraline 50 milliGRAM(s) Oral at bedtime  sodium bicarbonate Mouth Rinse 10 milliLiter(s) Swish and Spit five times a day  sodium chloride 0.45%. 1000 milliLiter(s) (200 mL/Hr) IV Continuous <Continuous>  sodium chloride 0.9%. 1000 milliLiter(s) (20 mL/Hr) IV Continuous <Continuous>  tamsulosin 0.4 milliGRAM(s) Oral at bedtime  trimethoprim  160 mG/sulfamethoxazole 800 mG 1 Tablet(s) Oral every 12 hours  ursodiol Capsule 300 milliGRAM(s) Oral two times a day with meals  voriconazole 200 milliGRAM(s) Oral every 12 hours    MEDICATIONS  (PRN):  acetaminophen   Tablet .. 650 milliGRAM(s) Oral every 6 hours PRN Temp greater or equal to 38C (100.4F), Mild Pain (1 - 3)  diphenhydrAMINE   Injectable 25 milliGRAM(s) IV Push every 4 hours PRN Allergy symptoms  docusate sodium 100 milliGRAM(s) Oral three times a day PRN Constipation  metoclopramide Injectable 10 milliGRAM(s) IV Push every 6 hours PRN Nausea and/or Vomiting  senna 1 Tablet(s) Oral at bedtime PRN Constipation  sodium chloride 0.9% lock flush 10 milliLiter(s) IV Push every 1 hour PRN Pre/post blood products, medications, blood draw, and to maintain line patency

## 2019-10-04 NOTE — CONSULT NOTE ADULT - ASSESSMENT
Mr. Delong is a 59 year old male, with previously diagnosed acute myeloid leukemia. s/p induction chemotherapy with Daunorubicin/Cytarabine and  HIDAC consolidation chemotherapy, now admitted for Haplo-identical stem cell transplant from son:    - NO evidence of volume overload at present.    - Continue to maintain strict I's and O's, and to monitor for signs of volume overload, which he is at risk for.  - No evidence of acute ischemia  - HR and BP are controlled  - Continue ACEI and BB at current doses  - Continue heparin gtt, and adjust rate per protocol  - Monitor and replete lytes  - To follow while admitted

## 2019-10-04 NOTE — DIETITIAN INITIAL EVALUATION ADULT. - ENERGY NEEDS
Pt with PMH Including non-ischemic cardiomyopathy, HTN, now day -6 pre-haploidentical PBSCT Pt with PMH Including non-ischemic cardiomyopathy, HTN, now day -5 pre-haploidentical PBSCT per NP.     Ht: 5'11", Wt: 179.6 lbs, BMI: 25 kg/m2, IBW: 172 lbs +/- 10%, %IBW: 104%  No edema, Skin intact

## 2019-10-04 NOTE — DIETITIAN INITIAL EVALUATION ADULT. - ADD RECOMMEND
1. Continue to encourage po intake and obtain/honor food preferences as able-provided alternative cold menu items list, 2. Monitor PO intake, diet tolerance, weight trends, labs, and skin integrity

## 2019-10-04 NOTE — DIETITIAN INITIAL EVALUATION ADULT. - OTHER INFO
Diet PTA: Pt reports eating well with good appetite consuming 3 meals per day consisting of a variety of foods with no recent changes in appetite or intake. Pt follows no dietary restrictions at home.    Weight: Pt reports UBW prior to diagnosis (4/2019) was 200 lbs and pt experienced a significant 32 lb wt loss within 2-3 months to a low of 168 lbs (last at this weight June/July 2019), within the past few months pt had been able to regain a portion of weight lost to ~180 lbs which is consistent with current admission weight 179.6 lbs.     Pt with no food allergies, no micronutrient supplementation PTA. No N+V, last BM yesterday. Pt with full upper and lower dentures which fit him well.     Diet education: RD briefly reviewed food safety diet guidelines, importance of adequate po intake during transplant process with overall goal for weight maintenance, usage of glutasolve packet.

## 2019-10-05 LAB
ALBUMIN SERPL ELPH-MCNC: 3.5 G/DL — SIGNIFICANT CHANGE UP (ref 3.3–5)
ALP SERPL-CCNC: 69 U/L — SIGNIFICANT CHANGE UP (ref 40–120)
ALT FLD-CCNC: 22 U/L — SIGNIFICANT CHANGE UP (ref 10–45)
ANION GAP SERPL CALC-SCNC: 13 MMOL/L — SIGNIFICANT CHANGE UP (ref 5–17)
AST SERPL-CCNC: 25 U/L — SIGNIFICANT CHANGE UP (ref 10–40)
BASOPHILS # BLD AUTO: 0 K/UL — SIGNIFICANT CHANGE UP (ref 0–0.2)
BASOPHILS NFR BLD AUTO: 0 % — SIGNIFICANT CHANGE UP (ref 0–2)
BILIRUB SERPL-MCNC: 0.2 MG/DL — SIGNIFICANT CHANGE UP (ref 0.2–1.2)
BUN SERPL-MCNC: 19 MG/DL — SIGNIFICANT CHANGE UP (ref 7–23)
CALCIUM SERPL-MCNC: 8.3 MG/DL — LOW (ref 8.4–10.5)
CHLORIDE SERPL-SCNC: 102 MMOL/L — SIGNIFICANT CHANGE UP (ref 96–108)
CO2 SERPL-SCNC: 21 MMOL/L — LOW (ref 22–31)
CREAT SERPL-MCNC: 1.35 MG/DL — HIGH (ref 0.5–1.3)
EOSINOPHIL # BLD AUTO: 0.09 K/UL — SIGNIFICANT CHANGE UP (ref 0–0.5)
EOSINOPHIL NFR BLD AUTO: 1.7 % — SIGNIFICANT CHANGE UP (ref 0–6)
G6PD RBC-CCNC: 11.7 U/G HGB — SIGNIFICANT CHANGE UP (ref 7–20.5)
GLUCOSE SERPL-MCNC: 99 MG/DL — SIGNIFICANT CHANGE UP (ref 70–99)
HCT VFR BLD CALC: 26.3 % — LOW (ref 39–50)
HGB BLD-MCNC: 8.8 G/DL — LOW (ref 13–17)
LDH SERPL L TO P-CCNC: 184 U/L — SIGNIFICANT CHANGE UP (ref 50–242)
LYMPHOCYTES # BLD AUTO: 0.09 K/UL — LOW (ref 1–3.3)
LYMPHOCYTES # BLD AUTO: 1.7 % — LOW (ref 13–44)
MAGNESIUM SERPL-MCNC: 1.7 MG/DL — SIGNIFICANT CHANGE UP (ref 1.6–2.6)
MANUAL SMEAR VERIFICATION: SIGNIFICANT CHANGE UP
MCHC RBC-ENTMCNC: 31.8 PG — SIGNIFICANT CHANGE UP (ref 27–34)
MCHC RBC-ENTMCNC: 33.5 GM/DL — SIGNIFICANT CHANGE UP (ref 32–36)
MCV RBC AUTO: 94.9 FL — SIGNIFICANT CHANGE UP (ref 80–100)
METAMYELOCYTES # FLD: 0.9 % — HIGH (ref 0–0)
MONOCYTES # BLD AUTO: 0.23 K/UL — SIGNIFICANT CHANGE UP (ref 0–0.9)
MONOCYTES NFR BLD AUTO: 4.4 % — SIGNIFICANT CHANGE UP (ref 2–14)
NEUTROPHILS # BLD AUTO: 4.81 K/UL — SIGNIFICANT CHANGE UP (ref 1.8–7.4)
NEUTROPHILS NFR BLD AUTO: 82.5 % — HIGH (ref 43–77)
NEUTS BAND # BLD: 8.8 % — HIGH (ref 0–8)
PHOSPHATE SERPL-MCNC: 4.3 MG/DL — SIGNIFICANT CHANGE UP (ref 2.5–4.5)
PLAT MORPH BLD: NORMAL — SIGNIFICANT CHANGE UP
PLATELET # BLD AUTO: 58 K/UL — LOW (ref 150–400)
POTASSIUM SERPL-MCNC: 4 MMOL/L — SIGNIFICANT CHANGE UP (ref 3.5–5.3)
POTASSIUM SERPL-SCNC: 4 MMOL/L — SIGNIFICANT CHANGE UP (ref 3.5–5.3)
PROT SERPL-MCNC: 5.7 G/DL — LOW (ref 6–8.3)
RBC # BLD: 2.77 M/UL — LOW (ref 4.2–5.8)
RBC # FLD: 16.8 % — HIGH (ref 10.3–14.5)
RBC BLD AUTO: SIGNIFICANT CHANGE UP
SODIUM SERPL-SCNC: 136 MMOL/L — SIGNIFICANT CHANGE UP (ref 135–145)
WBC # BLD: 5.27 K/UL — SIGNIFICANT CHANGE UP (ref 3.8–10.5)
WBC # FLD AUTO: 5.27 K/UL — SIGNIFICANT CHANGE UP (ref 3.8–10.5)

## 2019-10-05 PROCEDURE — 99291 CRITICAL CARE FIRST HOUR: CPT

## 2019-10-05 PROCEDURE — 99232 SBSQ HOSP IP/OBS MODERATE 35: CPT

## 2019-10-05 RX ORDER — SENNA PLUS 8.6 MG/1
2 TABLET ORAL ONCE
Refills: 0 | Status: COMPLETED | OUTPATIENT
Start: 2019-10-05 | End: 2019-10-05

## 2019-10-05 RX ORDER — DOCUSATE SODIUM 100 MG
100 CAPSULE ORAL THREE TIMES A DAY
Refills: 0 | Status: DISCONTINUED | OUTPATIENT
Start: 2019-10-05 | End: 2019-11-01

## 2019-10-05 RX ORDER — FUROSEMIDE 40 MG
60 TABLET ORAL ONCE
Refills: 0 | Status: COMPLETED | OUTPATIENT
Start: 2019-10-05 | End: 2019-10-05

## 2019-10-05 RX ORDER — SENNA PLUS 8.6 MG/1
2 TABLET ORAL AT BEDTIME
Refills: 0 | Status: DISCONTINUED | OUTPATIENT
Start: 2019-10-05 | End: 2019-11-01

## 2019-10-05 RX ADMIN — Medication 1 LOZENGE: at 00:19

## 2019-10-05 RX ADMIN — Medication 10 MILLILITER(S): at 21:05

## 2019-10-05 RX ADMIN — Medication 5 MILLILITER(S): at 00:18

## 2019-10-05 RX ADMIN — VORICONAZOLE 200 MILLIGRAM(S): 10 INJECTION, POWDER, LYOPHILIZED, FOR SOLUTION INTRAVENOUS at 05:25

## 2019-10-05 RX ADMIN — CHLORHEXIDINE GLUCONATE 1 APPLICATION(S): 213 SOLUTION TOPICAL at 08:42

## 2019-10-05 RX ADMIN — APREPITANT 80 MILLIGRAM(S): 80 CAPSULE ORAL at 05:25

## 2019-10-05 RX ADMIN — Medication 5 MILLILITER(S): at 21:05

## 2019-10-05 RX ADMIN — ONDANSETRON 8 MILLIGRAM(S): 8 TABLET, FILM COATED ORAL at 10:32

## 2019-10-05 RX ADMIN — SODIUM CHLORIDE 200 MILLILITER(S): 9 INJECTION, SOLUTION INTRAVENOUS at 05:25

## 2019-10-05 RX ADMIN — SERTRALINE 50 MILLIGRAM(S): 25 TABLET, FILM COATED ORAL at 22:10

## 2019-10-05 RX ADMIN — URSODIOL 300 MILLIGRAM(S): 250 TABLET, FILM COATED ORAL at 17:54

## 2019-10-05 RX ADMIN — FLUDARABINE PHOSPHATE 204.72 MILLIGRAM(S): 25 INJECTION, SOLUTION INTRAVENOUS at 20:30

## 2019-10-05 RX ADMIN — Medication 1 LOZENGE: at 13:14

## 2019-10-05 RX ADMIN — Medication 0.5 MILLIGRAM(S): at 22:10

## 2019-10-05 RX ADMIN — Medication 100 MILLIGRAM(S): at 22:10

## 2019-10-05 RX ADMIN — Medication 1 LOZENGE: at 17:56

## 2019-10-05 RX ADMIN — Medication 1 MILLIGRAM(S): at 13:14

## 2019-10-05 RX ADMIN — SODIUM CHLORIDE 20 MILLILITER(S): 9 INJECTION INTRAMUSCULAR; INTRAVENOUS; SUBCUTANEOUS at 05:26

## 2019-10-05 RX ADMIN — SODIUM CHLORIDE 20 MILLILITER(S): 9 INJECTION INTRAMUSCULAR; INTRAVENOUS; SUBCUTANEOUS at 08:53

## 2019-10-05 RX ADMIN — Medication 1 TABLET(S): at 05:25

## 2019-10-05 RX ADMIN — Medication 10 MILLILITER(S): at 13:14

## 2019-10-05 RX ADMIN — Medication 1 TABLET(S): at 13:14

## 2019-10-05 RX ADMIN — SODIUM CHLORIDE 20 MILLILITER(S): 9 INJECTION INTRAMUSCULAR; INTRAVENOUS; SUBCUTANEOUS at 22:11

## 2019-10-05 RX ADMIN — ONDANSETRON 8 MILLIGRAM(S): 8 TABLET, FILM COATED ORAL at 01:52

## 2019-10-05 RX ADMIN — TAMSULOSIN HYDROCHLORIDE 0.4 MILLIGRAM(S): 0.4 CAPSULE ORAL at 22:10

## 2019-10-05 RX ADMIN — Medication 10 MILLILITER(S): at 17:55

## 2019-10-05 RX ADMIN — Medication 1 LOZENGE: at 21:05

## 2019-10-05 RX ADMIN — Medication 1 TABLET(S): at 17:54

## 2019-10-05 RX ADMIN — ONDANSETRON 8 MILLIGRAM(S): 8 TABLET, FILM COATED ORAL at 17:55

## 2019-10-05 RX ADMIN — SENNA PLUS 2 TABLET(S): 8.6 TABLET ORAL at 22:10

## 2019-10-05 RX ADMIN — Medication 311.1 MILLIGRAM(S): at 01:00

## 2019-10-05 RX ADMIN — MESNA 239.12 MILLIGRAM(S): 100 INJECTION, SOLUTION INTRAVENOUS at 00:36

## 2019-10-05 RX ADMIN — Medication 10 MILLILITER(S): at 08:39

## 2019-10-05 RX ADMIN — Medication 5 MILLILITER(S): at 13:14

## 2019-10-05 RX ADMIN — VORICONAZOLE 200 MILLIGRAM(S): 10 INJECTION, POWDER, LYOPHILIZED, FOR SOLUTION INTRAVENOUS at 17:54

## 2019-10-05 RX ADMIN — HEPARIN SODIUM 3.39 UNIT(S)/HR: 5000 INJECTION INTRAVENOUS; SUBCUTANEOUS at 17:57

## 2019-10-05 RX ADMIN — FINASTERIDE 5 MILLIGRAM(S): 5 TABLET, FILM COATED ORAL at 13:14

## 2019-10-05 RX ADMIN — Medication 60 MILLIGRAM(S): at 18:05

## 2019-10-05 RX ADMIN — Medication 5 MILLILITER(S): at 08:39

## 2019-10-05 RX ADMIN — Medication 10 MILLILITER(S): at 00:19

## 2019-10-05 RX ADMIN — URSODIOL 300 MILLIGRAM(S): 250 TABLET, FILM COATED ORAL at 08:39

## 2019-10-05 RX ADMIN — Medication 1 LOZENGE: at 08:39

## 2019-10-05 RX ADMIN — PANTOPRAZOLE SODIUM 40 MILLIGRAM(S): 20 TABLET, DELAYED RELEASE ORAL at 06:48

## 2019-10-05 RX ADMIN — Medication 5 MILLILITER(S): at 17:55

## 2019-10-05 NOTE — PROGRESS NOTE ADULT - SUBJECTIVE AND OBJECTIVE BOX
HPC Transplant Team                                                      Critical / Counseling Time Provided: 30 minutes                                                                                                                                                        Chief Complaint: Allogenic stem cell transplant    S: Patient seen and examined with HPC Transplant Team:     no complaint   Denies mouth / tongue / throat pain, dyspnea, cough, nausea, vomiting, diarrhea, abdominal pain       O: Vitals:   Vital Signs Last 24 Hrs  T(C): 36.3 (05 Oct 2019 05:18), Max: 36.6 (04 Oct 2019 21:52)  T(F): 97.3 (05 Oct 2019 05:18), Max: 97.9 (04 Oct 2019 21:52)  HR: 74 (05 Oct 2019 05:18) (71 - 82)  BP: 102/62 (05 Oct 2019 05:18) (100/58 - 137/74)  BP(mean): --  RR: 18 (05 Oct 2019 05:18) (16 - 18)  SpO2: 94% (05 Oct 2019 05:18) (94% - 99%)    Admit weight:   Daily     Daily Weight in k.4 (04 Oct 2019 12:51)    Intake / Output:   10-04 @ 07:01  -  10-05 @ 07:00  --------------------------------------------------------  IN: 7345.5 mL / OUT: 6970 mL / NET: 375.5 mL      PE:   Oropharynx: no ulceration or sore  Oral Mucositis:      none                                                  Grade:   CVS: reg S1 S2  Lungs: CTA  Abdomen: soft, + BS, NT, ND  Extremities: No C/C/E  Gastric Mucositis:     none                                             Grade:   Intestinal Mucositis:      none                                        Grade:   Skin: no rash  TLC:  C/D/I  Neuro:   Pain: none          Labs:   CBC Full  -  ( 05 Oct 2019 06:44 )  WBC Count : 5.27 K/uL  Hemoglobin : 8.8 g/dL  Hematocrit : 26.3 %  Platelet Count - Automated : 58 K/uL  Mean Cell Volume : 94.9 fl  Mean Cell Hemoglobin : 31.8 pg  Mean Cell Hemoglobin Concentration : 33.5 gm/dL  Auto Neutrophil # : x  Auto Lymphocyte # : x  Auto Monocyte # : x  Auto Eosinophil # : x  Auto Basophil # : x  Auto Neutrophil % : x  Auto Lymphocyte % : x  Auto Monocyte % : x  Auto Eosinophil % : x  Auto Basophil % : x                          8.8    5.27  )-----------( 58       ( 05 Oct 2019 06:44 )             26.3     10-05    136  |  102  |  19  ----------------------------<  99  4.0   |  21<L>  |  1.35<H>    Ca    8.3<L>      05 Oct 2019 06:44  Phos  4.3     10-05  Mg     1.7     10-05    TPro  5.7<L>  /  Alb  3.5  /  TBili  0.2  /  DBili  x   /  AST  25  /  ALT  22  /  AlkPhos  69  10-05    PT/INR - ( 03 Oct 2019 18:01 )   PT: 10.5 sec;   INR: 0.92 ratio         PTT - ( 03 Oct 2019 18:01 )  PTT:24.3 sec  LIVER FUNCTIONS - ( 05 Oct 2019 06:44 )  Alb: 3.5 g/dL / Pro: 5.7 g/dL / ALK PHOS: 69 U/L / ALT: 22 U/L / AST: 25 U/L / GGT: x           Lactate Dehydrogenase, Serum: 184 U/L (10-05 @ 06:44)  Lactate Dehydrogenase, Serum: 281 U/L (10-04 @ 07:34)              Cultures:         Radiology:       Meds:   Antimicrobials:   clotrimazole Lozenge 1 Lozenge Oral five times a day  trimethoprim  160 mG/sulfamethoxazole 800 mG 1 Tablet(s) Oral every 12 hours  voriconazole 200 milliGRAM(s) Oral every 12 hours      Heme / Onc:   fludarabine IVPB (eMAR) 59 milliGRAM(s) IV Intermittent every 24 hours  heparin  Infusion 339 Unit(s)/Hr IV Continuous <Continuous>      GI:  docusate sodium 100 milliGRAM(s) Oral three times a day PRN  pantoprazole    Tablet 40 milliGRAM(s) Oral before breakfast  senna 1 Tablet(s) Oral at bedtime PRN  sodium bicarbonate Mouth Rinse 10 milliLiter(s) Swish and Spit five times a day  ursodiol Capsule 300 milliGRAM(s) Oral two times a day with meals      Cardiovascular:   enalapril 2.5 milliGRAM(s) Oral daily  metoprolol succinate ER 12.5 milliGRAM(s) Oral two times a day  tamsulosin 0.4 milliGRAM(s) Oral at bedtime      Immunologic:       Other medications:   acetaminophen   Tablet .. 650 milliGRAM(s) Oral every 6 hours  ALPRAZolam 0.5 milliGRAM(s) Oral at bedtime  aprepitant 80 milliGRAM(s) Oral every 24 hours  Biotene Dry Mouth Oral Rinse 5 milliLiter(s) Swish and Spit five times a day  chlorhexidine 4% Liquid 1 Application(s) Topical <User Schedule>  finasteride 5 milliGRAM(s) Oral daily  folic acid 1 milliGRAM(s) Oral daily  multivitamin 1 Tablet(s) Oral daily  ondansetron Injectable 8 milliGRAM(s) IV Push every 8 hours  sertraline 50 milliGRAM(s) Oral at bedtime  sodium chloride 0.45%. 1000 milliLiter(s) IV Continuous <Continuous>  sodium chloride 0.9%. 1000 milliLiter(s) IV Continuous <Continuous>      PRN:   acetaminophen   Tablet .. 650 milliGRAM(s) Oral every 6 hours PRN  diphenhydrAMINE   Injectable 25 milliGRAM(s) IV Push every 4 hours PRN  docusate sodium 100 milliGRAM(s) Oral three times a day PRN  metoclopramide Injectable 10 milliGRAM(s) IV Push every 6 hours PRN  senna 1 Tablet(s) Oral at bedtime PRN  sodium chloride 0.9% lock flush 10 milliLiter(s) IV Push every 1 hour PRN    Mr. Delong is a 59 year old male, with previously diagnosed acute myeloid leukemia. s/p induction chemotherapy with Daunorubicin/Cytarabine and  HIDAC consolidation chemotherapy, now admitted for Haplo-identical stem cell transplant Day -4 today   Conditioning regimen of Fludarabine, Cytoxan, and TBI.   Other history includes non-ischemic cardiomyopathy (recent EF 25% 19), COLLEEN, DVT/PE, HTN, Factor V Leiden and amputation of right toe due to osteomyelitis.   Lasix PRN for weight gain    1. Infectious Disease:   clotrimazole Lozenge 1 Lozenge Oral five times a day  trimethoprim  160 mG/sulfamethoxazole 800 mG 1 Tablet(s) Oral every 12 hours  voriconazole 200 milliGRAM(s) Oral every 12 hours      2. VOD Prophylaxis: Actigall, Glutamine, Heparin (dosed at 100 units / kg / day)     3. GI Prophylaxis:  Protonix    4. Mouthcare - NS / NaHCO3 rinses, Mycelex, Biotene; Skin care     5. GVHD prophylaxis     6. Transfuse & replete electrolytes prn   Lasix 40 mg iv x1 this am; 20 mg at 2pm.   KCL to prevent hypokalemia  Mg sulfate 1 g IVPB x1 for mild hypomagnesemia   7. IV hydration, daily weights, strict I&O, prn diuresis     8. PO intake as tolerated, nutrition follow up as needed, MVI, folic acid   9. Antiemetics, anti-diarrhea medications:        10. OOB as tolerated, physical therapy consult if needed     11. Monitor coags / fibrinogen 2x week, vitamin K as needed     12. Monitor closely for clinical changes, monitor for fevers     13. Emotional support provided, plan of care discussed and questions addressed     14. Patient education done regarding chemotherapy prep, plan of care, restrictions and discharge planning     15. Continue regular social work input     I have written the above note for Dr. Quiñones  who performed service with me in the room.   Brittney Mcmahon NP ([phone])    I have seen and examined patient with NP, I agree with above note as scribed. HPC Transplant Team                                                      Critical / Counseling Time Provided: 30 minutes                                                                                                                                                        Chief Complaint: Allogenic stem cell transplant    S: Patient seen and examined with HPC Transplant Team:     no complaint   +Constipation x 3 days  Denies mouth / tongue / throat pain, dyspnea, cough, nausea, vomiting, diarrhea, abdominal pain       O: Vitals:   Vital Signs Last 24 Hrs  T(C): 36.3 (05 Oct 2019 05:18), Max: 36.6 (04 Oct 2019 21:52)  T(F): 97.3 (05 Oct 2019 05:18), Max: 97.9 (04 Oct 2019 21:52)  HR: 74 (05 Oct 2019 05:18) (71 - 82)  BP: 102/62 (05 Oct 2019 05:18) (100/58 - 137/74)  BP(mean): --  RR: 18 (05 Oct 2019 05:18) (16 - 18)  SpO2: 94% (05 Oct 2019 05:18) (94% - 99%)    Admit weight:   Daily   81.7 today  Daily Weight in k.4 (04 Oct 2019 12:51)    Intake / Output:   10-04 @ 07:01  -  10-05 @ 07:00  --------------------------------------------------------  IN: 7345.5 mL / OUT: 6970 mL / NET: 375.5 mL      PE:   Oropharynx: no ulceration or erythema  Oral Mucositis:      none                                                  Grade:   CVS: reg S1 S2  Lungs: CTA  Abdomen: soft, + normoactive BS, NT, ND  Extremities: No C/C/E  Gastric Mucositis:     none                                             Grade:   Intestinal Mucositis:      none                                        Grade:   Skin: no rash  TLC:  C/D/I  Neuro: Alert and oriented x3, no focal deficit   Pain: none          Labs:   CBC Full  -  ( 05 Oct 2019 06:44 )  WBC Count : 5.27 K/uL  Hemoglobin : 8.8 g/dL  Hematocrit : 26.3 %  Platelet Count - Automated : 58 K/uL  Mean Cell Volume : 94.9 fl  Mean Cell Hemoglobin : 31.8 pg  Mean Cell Hemoglobin Concentration : 33.5 gm/dL  Auto Neutrophil # : x  Auto Lymphocyte # : x  Auto Monocyte # : x  Auto Eosinophil # : x  Auto Basophil # : x  Auto Neutrophil % : x  Auto Lymphocyte % : x  Auto Monocyte % : x  Auto Eosinophil % : x  Auto Basophil % : x                          8.8    5.27  )-----------( 58       ( 05 Oct 2019 06:44 )             26.3     10-05    136  |  102  |  19  ----------------------------<  99  4.0   |  21<L>  |  1.35<H>    Ca    8.3<L>      05 Oct 2019 06:44  Phos  4.3     10-05  Mg     1.7     10-05    TPro  5.7<L>  /  Alb  3.5  /  TBili  0.2  /  DBili  x   /  AST  25  /  ALT  22  /  AlkPhos  69  10-05    PT/INR - ( 03 Oct 2019 18:01 )   PT: 10.5 sec;   INR: 0.92 ratio         PTT - ( 03 Oct 2019 18:01 )  PTT:24.3 sec  LIVER FUNCTIONS - ( 05 Oct 2019 06:44 )  Alb: 3.5 g/dL / Pro: 5.7 g/dL / ALK PHOS: 69 U/L / ALT: 22 U/L / AST: 25 U/L / GGT: x           Lactate Dehydrogenase, Serum: 184 U/L (10-05 @ 06:44)  Lactate Dehydrogenase, Serum: 281 U/L (10-04 @ 07:34)              Cultures:         Radiology:       Meds:   Antimicrobials:   clotrimazole Lozenge 1 Lozenge Oral five times a day  trimethoprim  160 mG/sulfamethoxazole 800 mG 1 Tablet(s) Oral every 12 hours  voriconazole 200 milliGRAM(s) Oral every 12 hours      Heme / Onc:   fludarabine IVPB (eMAR) 59 milliGRAM(s) IV Intermittent every 24 hours  heparin  Infusion 339 Unit(s)/Hr IV Continuous <Continuous>      GI:  docusate sodium 100 milliGRAM(s) Oral three times a day PRN  pantoprazole    Tablet 40 milliGRAM(s) Oral before breakfast  senna 1 Tablet(s) Oral at bedtime PRN  sodium bicarbonate Mouth Rinse 10 milliLiter(s) Swish and Spit five times a day  ursodiol Capsule 300 milliGRAM(s) Oral two times a day with meals      Cardiovascular:   enalapril 2.5 milliGRAM(s) Oral daily  metoprolol succinate ER 12.5 milliGRAM(s) Oral two times a day  tamsulosin 0.4 milliGRAM(s) Oral at bedtime      Immunologic:       Other medications:   acetaminophen   Tablet .. 650 milliGRAM(s) Oral every 6 hours  ALPRAZolam 0.5 milliGRAM(s) Oral at bedtime  aprepitant 80 milliGRAM(s) Oral every 24 hours  Biotene Dry Mouth Oral Rinse 5 milliLiter(s) Swish and Spit five times a day  chlorhexidine 4% Liquid 1 Application(s) Topical <User Schedule>  finasteride 5 milliGRAM(s) Oral daily  folic acid 1 milliGRAM(s) Oral daily  multivitamin 1 Tablet(s) Oral daily  ondansetron Injectable 8 milliGRAM(s) IV Push every 8 hours  sertraline 50 milliGRAM(s) Oral at bedtime  sodium chloride 0.45%. 1000 milliLiter(s) IV Continuous <Continuous>  sodium chloride 0.9%. 1000 milliLiter(s) IV Continuous <Continuous>      PRN:   acetaminophen   Tablet .. 650 milliGRAM(s) Oral every 6 hours PRN  diphenhydrAMINE   Injectable 25 milliGRAM(s) IV Push every 4 hours PRN  docusate sodium 100 milliGRAM(s) Oral three times a day PRN  metoclopramide Injectable 10 milliGRAM(s) IV Push every 6 hours PRN  senna 1 Tablet(s) Oral at bedtime PRN  sodium chloride 0.9% lock flush 10 milliLiter(s) IV Push every 1 hour PRN    Mr. Delong is a 59 year old male, with previously diagnosed acute myeloid leukemia. s/p induction chemotherapy with Daunorubicin/Cytarabine and  HIDAC consolidation chemotherapy, now admitted for Haplo-identical stem cell transplant Day -4 today   Conditioning regimen of Fludarabine, Cytoxan, and TBI.   Other history includes non-ischemic cardiomyopathy (recent EF 25% 19), COLLEEN, DVT/PE, HTN, Factor V Leiden and amputation of right toe due to osteomyelitis.   Lasix PRN for weight gain    1. Infectious Disease:   clotrimazole Lozenge 1 Lozenge Oral five times a day  trimethoprim  160 mG/sulfamethoxazole 800 mG 1 Tablet(s) Oral every 12 hours  voriconazole 200 milliGRAM(s) Oral every 12 hours      2. VOD Prophylaxis: Actigall, Glutamine, Heparin (dosed at 100 units / kg / day)     3. GI Prophylaxis:  Protonix    4. Mouthcare - NS / NaHCO3 rinses, Mycelex, Biotene; Skin care     5. GVHD prophylaxis     6. Transfuse & replete electrolytes prn   Lasix 40 mg iv x1 this am; 20 mg at 2pm.   KCL to prevent hypokalemia  Mg sulfate 1 g IVPB x1 for mild hypomagnesemia   7. IV hydration, daily weights, strict I&O, prn diuresis     8. PO intake as tolerated, nutrition follow up as needed, MVI, folic acid   9. Antiemetics, anti-diarrhea medications:        10. OOB as tolerated, physical therapy consult if needed     11. Monitor coags / fibrinogen 2x week, vitamin K as needed     12. Monitor closely for clinical changes, monitor for fevers     13. Emotional support provided, plan of care discussed and questions addressed     14. Patient education done regarding chemotherapy prep, plan of care, restrictions and discharge planning     15. Continue regular social work input     I have written the above note for Dr. Quiñones  who performed service with me in the room.   Brittney Mcmahon NP ([phone])    I have seen and examined patient with NP, I agree with above note as scribed. [facility]PC Transplant Team                                                      Critical / Counseling Time Provided: 30 minutes                                                                                                                                                        Chief Complaint: Allogeneic stem cell transplant    S: Patient seen and examined with HPC Transplant Team:     no complaint   +Constipation x 3 days  Denies mouth / tongue / throat pain, dyspnea, cough, nausea, vomiting, diarrhea, abdominal pain       O: Vitals:   Vital Signs Last 24 Hrs  T(C): 36.3 (05 Oct 2019 05:18), Max: 36.6 (04 Oct 2019 21:52)  T(F): 97.3 (05 Oct 2019 05:18), Max: 97.9 (04 Oct 2019 21:52)  HR: 74 (05 Oct 2019 05:18) (71 - 82)  BP: 102/62 (05 Oct 2019 05:18) (100/58 - 137/74)  BP(mean): --  RR: 18 (05 Oct 2019 05:18) (16 - 18)  SpO2: 94% (05 Oct 2019 05:18) (94% - 99%)    Admit weight:   Daily   81.7 today  Daily Weight in k.4 (04 Oct 2019 12:51)    Intake / Output:   10-04 @ 07:01  -  10-05 @ 07:00  --------------------------------------------------------  IN: 7345.5 mL / OUT: 6970 mL / NET: 375.5 mL      PE:   Oropharynx: no ulceration or erythema  Oral Mucositis:      none                                                  Grade:   CVS: reg S1 S2  Lungs: CTA  Abdomen: soft, + normoactive BS, NT, ND  Extremities: No C/C/E  Gastric Mucositis:     none                                             Grade:   Intestinal Mucositis:      none                                        Grade:   Skin: no rash  TLC:  C/D/I  Neuro: Alert and oriented x3, no focal deficit   Pain: none          Labs:   CBC Full  -  ( 05 Oct 2019 06:44 )  WBC Count : 5.27 K/uL  Hemoglobin : 8.8 g/dL  Hematocrit : 26.3 %  Platelet Count - Automated : 58 K/uL  Mean Cell Volume : 94.9 fl  Mean Cell Hemoglobin : 31.8 pg  Mean Cell Hemoglobin Concentration : 33.5 gm/dL  Auto Neutrophil # : x  Auto Lymphocyte # : x  Auto Monocyte # : x  Auto Eosinophil # : x  Auto Basophil # : x  Auto Neutrophil % : x  Auto Lymphocyte % : x  Auto Monocyte % : x  Auto Eosinophil % : x  Auto Basophil % : x                          8.8    5.27  )-----------( 58       ( 05 Oct 2019 06:44 )             26.3     10-05    136  |  102  |  19  ----------------------------<  99  4.0   |  21<L>  |  1.35<H>    Ca    8.3<L>      05 Oct 2019 06:44  Phos  4.3     10-05  Mg     1.7     10-05    TPro  5.7<L>  /  Alb  3.5  /  TBili  0.2  /  DBili  x   /  AST  25  /  ALT  22  /  AlkPhos  69  10-05    PT/INR - ( 03 Oct 2019 18:01 )   PT: 10.5 sec;   INR: 0.92 ratio         PTT - ( 03 Oct 2019 18:01 )  PTT:24.3 sec  LIVER FUNCTIONS - ( 05 Oct 2019 06:44 )  Alb: 3.5 g/dL / Pro: 5.7 g/dL / ALK PHOS: 69 U/L / ALT: 22 U/L / AST: 25 U/L / GGT: x           Lactate Dehydrogenase, Serum: 184 U/L (10-05 @ 06:44)  Lactate Dehydrogenase, Serum: 281 U/L (10-04 @ 07:34)              Cultures:         Radiology:       Meds:   Antimicrobials:   clotrimazole Lozenge 1 Lozenge Oral five times a day  trimethoprim  160 mG/sulfamethoxazole 800 mG 1 Tablet(s) Oral every 12 hours  voriconazole 200 milliGRAM(s) Oral every 12 hours      Heme / Onc:   fludarabine IVPB (eMAR) 59 milliGRAM(s) IV Intermittent every 24 hours  heparin  Infusion 339 Unit(s)/Hr IV Continuous <Continuous>      GI:  docusate sodium 100 milliGRAM(s) Oral three times a day PRN  pantoprazole    Tablet 40 milliGRAM(s) Oral before breakfast  senna 1 Tablet(s) Oral at bedtime PRN  sodium bicarbonate Mouth Rinse 10 milliLiter(s) Swish and Spit five times a day  ursodiol Capsule 300 milliGRAM(s) Oral two times a day with meals      Cardiovascular:   enalapril 2.5 milliGRAM(s) Oral daily  metoprolol succinate ER 12.5 milliGRAM(s) Oral two times a day  tamsulosin 0.4 milliGRAM(s) Oral at bedtime      Immunologic:       Other medications:   acetaminophen   Tablet .. 650 milliGRAM(s) Oral every 6 hours  ALPRAZolam 0.5 milliGRAM(s) Oral at bedtime  aprepitant 80 milliGRAM(s) Oral every 24 hours  Biotene Dry Mouth Oral Rinse 5 milliLiter(s) Swish and Spit five times a day  chlorhexidine 4% Liquid 1 Application(s) Topical <User Schedule>  finasteride 5 milliGRAM(s) Oral daily  folic acid 1 milliGRAM(s) Oral daily  multivitamin 1 Tablet(s) Oral daily  ondansetron Injectable 8 milliGRAM(s) IV Push every 8 hours  sertraline 50 milliGRAM(s) Oral at bedtime  sodium chloride 0.45%. 1000 milliLiter(s) IV Continuous <Continuous>  sodium chloride 0.9%. 1000 milliLiter(s) IV Continuous <Continuous>      PRN:   acetaminophen   Tablet .. 650 milliGRAM(s) Oral every 6 hours PRN  diphenhydrAMINE   Injectable 25 milliGRAM(s) IV Push every 4 hours PRN  docusate sodium 100 milliGRAM(s) Oral three times a day PRN  metoclopramide Injectable 10 milliGRAM(s) IV Push every 6 hours PRN  senna 1 Tablet(s) Oral at bedtime PRN  sodium chloride 0.9% lock flush 10 milliLiter(s) IV Push every 1 hour PRN    Mr. Delong is a 59 year old male, with previously diagnosed acute myeloid leukemia. s/p induction chemotherapy with Daunorubicin/Cytarabine and  HIDAC consolidation chemotherapy, now admitted for Haplo-identical stem cell transplant Day -4 today   Conditioning regimen of Fludarabine, Cytoxan, and TBI.   Other history includes non-ischemic cardiomyopathy (recent EF 25% 19), COLLEEN, DVT/PE, HTN, Factor V Leiden and amputation of right toe due to osteomyelitis.   Lasix PRN for weight gain    1. Infectious Disease:   clotrimazole Lozenge 1 Lozenge Oral five times a day  trimethoprim  160 mG/sulfamethoxazole 800 mG 1 Tablet(s) Oral every 12 hours  voriconazole 200 milliGRAM(s) Oral every 12 hours      2. VOD Prophylaxis: Actigall, Glutamine, Heparin (dosed at 100 units / kg / day)     3. GI Prophylaxis:  Protonix    4. Mouthcare - NS / NaHCO3 rinses, Mycelex, Biotene; Skin care     5. GVHD prophylaxis     6. Transfuse & replete electrolytes prn   Lasix 40 mg iv x1 this am; 20 mg at 2pm.   KCL to prevent hypokalemia  Mg sulfate 1 g IVPB x1 for mild hypomagnesemia   7. IV hydration, daily weights, strict I&O, prn diuresis     8. PO intake as tolerated, nutrition follow up as needed, MVI, folic acid   9. Antiemetics, anti-diarrhea medications:        10. OOB as tolerated, physical therapy consult if needed     11. Monitor coags / fibrinogen 2x week, vitamin K as needed     12. Monitor closely for clinical changes, monitor for fevers     13. Emotional support provided, plan of care discussed and questions addressed     14. Patient education done regarding chemotherapy prep, plan of care, restrictions and discharge planning     15. Continue regular social work input     I have written the above note for Dr. Qiuñones  who performed service with me in the room.   Brittney Mcmahon NP ([phone])    I have seen and examined patient with NP, I agree with above note as scribed. HPC Transplant Team                                                      Critical / Counseling Time Provided: 30 minutes                                                                                                                                                        Chief Complaint: Allogeneic stem cell transplant    S: Patient seen and examined with HPC Transplant Team:     no complaint   +Constipation x 3 days  Denies mouth / tongue / throat pain, dyspnea, cough, nausea, vomiting, diarrhea, abdominal pain       O: Vitals:   Vital Signs Last 24 Hrs  T(C): 36.3 (05 Oct 2019 05:18), Max: 36.6 (04 Oct 2019 21:52)  T(F): 97.3 (05 Oct 2019 05:18), Max: 97.9 (04 Oct 2019 21:52)  HR: 74 (05 Oct 2019 05:18) (71 - 82)  BP: 102/62 (05 Oct 2019 05:18) (100/58 - 137/74)  BP(mean): --  RR: 18 (05 Oct 2019 05:18) (16 - 18)  SpO2: 94% (05 Oct 2019 05:18) (94% - 99%)    Admit weight:   Daily   81.7 today  Daily Weight in k.4 (04 Oct 2019 12:51)    Intake / Output:   10-04 @ 07:01  -  10-05 @ 07:00  --------------------------------------------------------  IN: 7345.5 mL / OUT: 6970 mL / NET: 375.5 mL      PE:   Oropharynx: no ulceration or erythema  Oral Mucositis:      none                                                  Grade:   CVS: reg S1 S2  Lungs: CTA  Abdomen: soft, + normoactive BS, NT, ND  Extremities: No C/C/E  Gastric Mucositis:     none                                             Grade:   Intestinal Mucositis:      none                                        Grade:   Skin: no rash  TLC:  C/D/I  Neuro: Alert and oriented x3, no focal deficit   Pain: none          Labs:   CBC Full  -  ( 05 Oct 2019 06:44 )  WBC Count : 5.27 K/uL  Hemoglobin : 8.8 g/dL  Hematocrit : 26.3 %  Platelet Count - Automated : 58 K/uL  Mean Cell Volume : 94.9 fl  Mean Cell Hemoglobin : 31.8 pg  Mean Cell Hemoglobin Concentration : 33.5 gm/dL  Auto Neutrophil # : x  Auto Lymphocyte # : x  Auto Monocyte # : x  Auto Eosinophil # : x  Auto Basophil # : x  Auto Neutrophil % : x  Auto Lymphocyte % : x  Auto Monocyte % : x  Auto Eosinophil % : x  Auto Basophil % : x                          8.8    5.27  )-----------( 58       ( 05 Oct 2019 06:44 )             26.3     10-05    136  |  102  |  19  ----------------------------<  99  4.0   |  21<L>  |  1.35<H>    Ca    8.3<L>      05 Oct 2019 06:44  Phos  4.3     10-05  Mg     1.7     10-05    TPro  5.7<L>  /  Alb  3.5  /  TBili  0.2  /  DBili  x   /  AST  25  /  ALT  22  /  AlkPhos  69  10-05    PT/INR - ( 03 Oct 2019 18:01 )   PT: 10.5 sec;   INR: 0.92 ratio         PTT - ( 03 Oct 2019 18:01 )  PTT:24.3 sec  LIVER FUNCTIONS - ( 05 Oct 2019 06:44 )  Alb: 3.5 g/dL / Pro: 5.7 g/dL / ALK PHOS: 69 U/L / ALT: 22 U/L / AST: 25 U/L / GGT: x           Lactate Dehydrogenase, Serum: 184 U/L (10-05 @ 06:44)      Meds:   Antimicrobials:   clotrimazole Lozenge 1 Lozenge Oral five times a day  trimethoprim  160 mG/sulfamethoxazole 800 mG 1 Tablet(s) Oral every 12 hours  voriconazole 200 milliGRAM(s) Oral every 12 hours      Heme / Onc:   fludarabine IVPB (eMAR) 59 milliGRAM(s) IV Intermittent every 24 hours  heparin  Infusion 339 Unit(s)/Hr IV Continuous <Continuous>      GI:  docusate sodium 100 milliGRAM(s) Oral three times a day PRN  pantoprazole    Tablet 40 milliGRAM(s) Oral before breakfast  senna 1 Tablet(s) Oral at bedtime PRN  sodium bicarbonate Mouth Rinse 10 milliLiter(s) Swish and Spit five times a day  ursodiol Capsule 300 milliGRAM(s) Oral two times a day with meals      Cardiovascular:   enalapril 2.5 milliGRAM(s) Oral daily  metoprolol succinate ER 12.5 milliGRAM(s) Oral two times a day  tamsulosin 0.4 milliGRAM(s) Oral at bedtime      Other medications:   acetaminophen   Tablet .. 650 milliGRAM(s) Oral every 6 hours  ALPRAZolam 0.5 milliGRAM(s) Oral at bedtime  aprepitant 80 milliGRAM(s) Oral every 24 hours  Biotene Dry Mouth Oral Rinse 5 milliLiter(s) Swish and Spit five times a day  chlorhexidine 4% Liquid 1 Application(s) Topical <User Schedule>  finasteride 5 milliGRAM(s) Oral daily  folic acid 1 milliGRAM(s) Oral daily  multivitamin 1 Tablet(s) Oral daily  ondansetron Injectable 8 milliGRAM(s) IV Push every 8 hours  sertraline 50 milliGRAM(s) Oral at bedtime  sodium chloride 0.45%. 1000 milliLiter(s) IV Continuous <Continuous>  sodium chloride 0.9%. 1000 milliLiter(s) IV Continuous <Continuous>      PRN:   acetaminophen   Tablet .. 650 milliGRAM(s) Oral every 6 hours PRN  diphenhydrAMINE   Injectable 25 milliGRAM(s) IV Push every 4 hours PRN  docusate sodium 100 milliGRAM(s) Oral three times a day PRN  metoclopramide Injectable 10 milliGRAM(s) IV Push every 6 hours PRN  senna 1 Tablet(s) Oral at bedtime PRN  sodium chloride 0.9% lock flush 10 milliLiter(s) IV Push every 1 hour PRN      Mr. Delong is a 59 year old male, with previously diagnosed acute myeloid leukemia. s/p induction chemotherapy with Daunorubicin/Cytarabine and  HIDAC consolidation chemotherapy, now admitted for Haplo-identical stem cell transplant Day -4 today   Conditioning regimen of Fludarabine, Cytoxan, and TBI.   Other history includes non-ischemic cardiomyopathy (recent EF 25% 19), COLLEEN, DVT/PE, HTN, Factor V Leiden and amputation of right toe due to osteomyelitis.   Lasix PRN for weight gain    1. Infectious Disease:   clotrimazole Lozenge 1 Lozenge Oral five times a day  trimethoprim  160 mG/sulfamethoxazole 800 mG 1 Tablet(s) Oral every 12 hours  voriconazole 200 milliGRAM(s) Oral every 12 hours    2. VOD Prophylaxis: Actigall, Glutamine, Heparin (dosed at 100 units / kg / day)     3. GI Prophylaxis:  Protonix    4. Mouthcare - NS / NaHCO3 rinses, Mycelex, Biotene; Skin care     5. GVHD prophylaxis     6. Transfuse & replete electrolytes prn      7. IV hydration, daily weights, strict I&O, prn diuresis   Lasix 60 mg iv x1    8. PO intake as tolerated, nutrition follow up as needed, MVI, folic acid     9. Antiemetics, anti-diarrhea medications:      10. OOB as tolerated, physical therapy consult if needed     11. Monitor coags / fibrinogen 2x week, vitamin K as needed     12. Monitor closely for clinical changes, monitor for fevers     13. Emotional support provided, plan of care discussed and questions addressed     14. Patient education done regarding chemotherapy prep, plan of care, restrictions and discharge planning     15. Continue regular social work input     I have written the above note for Dr. Quiñones  who performed service with me in the room.   Brittney Mcmahon NP ([phone])    I have seen and examined patient with NP, I agree with above note as scribed.

## 2019-10-05 NOTE — ADVANCED PRACTICE NURSE CONSULT - ASSESSMENT
Cytarabine 59mg Iv over 30 mins and Cyclophosphamide 1111mg IV over 30 mins were administered via right TLC and patient tolerated well. Positive blood return was noted, no symptoms of adverse reactions was observed. Patient's safety maintained.

## 2019-10-05 NOTE — PROGRESS NOTE ADULT - SUBJECTIVE AND OBJECTIVE BOX
[facility] - Ede Zamudio, Riki, Edgar, Gem, Tabitha Garcia  Office Number:  [phone]    Patient resting comfortably in bed in NAD.  Laying flat with no respiratory distress.  No complaints of chest pain, dyspnea, palpitations, PND, or orthopnea.    F/U for:  cardiomyopathy      MEDICATIONS  (STANDING):  acetaminophen   Tablet .. 650 milliGRAM(s) Oral every 6 hours  ALPRAZolam 0.5 milliGRAM(s) Oral at bedtime  aprepitant 80 milliGRAM(s) Oral every 24 hours  Biotene Dry Mouth Oral Rinse 5 milliLiter(s) Swish and Spit five times a day  chlorhexidine 4% Liquid 1 Application(s) Topical <User Schedule>  clotrimazole Lozenge 1 Lozenge Oral five times a day  enalapril 2.5 milliGRAM(s) Oral daily  finasteride 5 milliGRAM(s) Oral daily  fludarabine IVPB (eMAR) 59 milliGRAM(s) IV Intermittent every 24 hours  folic acid 1 milliGRAM(s) Oral daily  heparin  Infusion 339 Unit(s)/Hr (3.39 mL/Hr) IV Continuous <Continuous>  metoprolol succinate ER 12.5 milliGRAM(s) Oral two times a day  multivitamin 1 Tablet(s) Oral daily  ondansetron Injectable 8 milliGRAM(s) IV Push every 8 hours  pantoprazole    Tablet 40 milliGRAM(s) Oral before breakfast  sertraline 50 milliGRAM(s) Oral at bedtime  sodium bicarbonate Mouth Rinse 10 milliLiter(s) Swish and Spit five times a day  sodium chloride 0.45%. 1000 milliLiter(s) (200 mL/Hr) IV Continuous <Continuous>  sodium chloride 0.9%. 1000 milliLiter(s) (20 mL/Hr) IV Continuous <Continuous>  tamsulosin 0.4 milliGRAM(s) Oral at bedtime  trimethoprim  160 mG/sulfamethoxazole 800 mG 1 Tablet(s) Oral every 12 hours  ursodiol Capsule 300 milliGRAM(s) Oral two times a day with meals  voriconazole 200 milliGRAM(s) Oral every 12 hours    MEDICATIONS  (PRN):  acetaminophen   Tablet .. 650 milliGRAM(s) Oral every 6 hours PRN Temp greater or equal to 38C (100.4F), Mild Pain (1 - 3)  diphenhydrAMINE   Injectable 25 milliGRAM(s) IV Push every 4 hours PRN Allergy symptoms  docusate sodium 100 milliGRAM(s) Oral three times a day PRN Constipation  metoclopramide Injectable 10 milliGRAM(s) IV Push every 6 hours PRN Nausea and/or Vomiting  senna 1 Tablet(s) Oral at bedtime PRN Constipation  sodium chloride 0.9% lock flush 10 milliLiter(s) IV Push every 1 hour PRN Pre/post blood products, medications, blood draw, and to maintain line patency      Allergies    No Known Allergies    Intolerances        Vital Signs Last 24 Hrs  T(C): 36.2 (05 Oct 2019 09:00), Max: 36.6 (04 Oct 2019 21:52)  T(F): 97.2 (05 Oct 2019 09:00), Max: 97.9 (04 Oct 2019 21:52)  HR: 80 (05 Oct 2019 08:42) (71 - 82)  BP: 110/68 (05 Oct 2019 08:42) (100/58 - 137/74)  BP(mean): --  RR: 18 (05 Oct 2019 09:00) (16 - 18)  SpO2: 98% (05 Oct 2019 09:00) (94% - 99%)    I&O's Summary    04 Oct 2019 07:01  -  05 Oct 2019 07:00  --------------------------------------------------------  IN: 7345.5 mL / OUT: 6970 mL / NET: 375.5 mL    05 Oct 2019 07:01  -  05 Oct 2019 09:16  --------------------------------------------------------  IN: 240 mL / OUT: 1000 mL / NET: -760 mL        ON EXAM:    General: NAD, awake and alert, oriented x 3  HEENT: Mucous membranes are moist, anicteric  Lungs: Non-labored, breath sounds are clear bilaterally, No wheezing, rales or rhonchi  Cardiovascular: Regular, S1 and S2, no murmurs, rubs, or gallops  Gastrointestinal: Bowel Sounds present, soft, nontender.   Lymph: No peripheral edema. No lymphadenopathy.  Skin: No rashes or ulcers  Psych:  Mood & affect appropriate    LABS: All Labs Reviewed:                        8.8    5.27  )-----------( 58       ( 05 Oct 2019 06:44 )             26.3                         9.3    7.22  )-----------( 63       ( 04 Oct 2019 07:35 )             27.1                         9.9    8.36  )-----------( 63       ( 03 Oct 2019 18:01 )             28.9     05 Oct 2019 06:44    136    |  102    |  19     ----------------------------<  99     4.0     |  21     |  1.35   04 Oct 2019 07:34    134    |  101    |  17     ----------------------------<  103    3.7     |  21     |  1.19   03 Oct 2019 18:01    137    |  106    |  19     ----------------------------<  97     4.7     |  22     |  1.01     Ca    8.3        05 Oct 2019 06:44  Ca    8.5        04 Oct 2019 07:34  Ca    8.8        03 Oct 2019 18:01  Phos  4.3       05 Oct 2019 06:44  Phos  3.7       04 Oct 2019 07:34  Phos  2.9       03 Oct 2019 18:01  Mg     1.7       05 Oct 2019 06:44  Mg     1.6       04 Oct 2019 07:34  Mg     1.9       03 Oct 2019 18:01    TPro  5.7    /  Alb  3.5    /  TBili  0.2    /  DBili  x      /  AST  25     /  ALT  22     /  AlkPhos  69     05 Oct 2019 06:44  TPro  5.3    /  Alb  3.4    /  TBili  0.2    /  DBili  <0.1   /  AST  34     /  ALT  22     /  AlkPhos  70     04 Oct 2019 07:34  TPro  6.0    /  Alb  3.8    /  TBili  0.2    /  DBili  <0.1   /  AST  46     /  ALT  21     /  AlkPhos  69     03 Oct 2019 18:01    PT/INR - ( 03 Oct 2019 18:01 )   PT: 10.5 sec;   INR: 0.92 ratio         PTT - ( 03 Oct 2019 18:01 )  PTT:24.3 sec      Blood Culture:

## 2019-10-05 NOTE — PROGRESS NOTE ADULT - ASSESSMENT
Mr. Delong is a 59 year old male, with previously diagnosed acute myeloid leukemia. s/p induction chemotherapy with Daunorubicin/Cytarabine and  HIDAC consolidation chemotherapy, now admitted for Haplo-identical stem cell transplant from son:    - NO evidence of volume overload at present.  He is on fluids and has been getting IV Lasix to maintain even to slightly neg balance  - Continue to maintain strict I's and O's, and to monitor for signs of volume overload, which he is at risk for.  - No evidence of acute ischemia  - HR and BP are controlled  - Continue ACEI and BB at current doses as tolerated by BP.  Metoprolol has been held on a few occasions.  - Continue heparin gtt, and adjust rate per protocol  - Monitor and replete lytes  - To follow while admitted

## 2019-10-05 NOTE — PROGRESS NOTE ADULT - ATTENDING COMMENTS
59 year old male, with previously diagnosed acute myeloid leukemia. s/p induction chemotherapy with Daunorubicin/Cytarabine and  HIDAC consolidation chemotherapy, now admitted for Haplo-identical stem cell transplant from sister. Conditioning regimen of Fludarabine, Cytoxan, and TBI. Other history includes non-ischemic cardiomyopathy (recent EF 25% 9/19/19), OCLLEEN, DVT/PE, HTN, Factor V Leiden and amputation of right toe due to osteomyelitis.  Today is day -5 of therapy.    Strict I&O, daily weights, prn diuresis   Will also get IVIG 0.4 g/kg (ideal body weight) every 3 weeks for GVHD ppx. Premedication with Tylenol and benadryl  TBI on day -1   Start Zarxio on day +5,  MMF and Tacrolimus. Check Tacrolimus levels on Monday and Thursday.   Anxiolytics, antinausea, antidiarrhea medications as needed   Lasix PRN while being aggressively hydrated to avoid VOD   Nutritional support, pain management.  hx non-ischemic CM, followed by Dr. Luke Lyn, last Echo 9/19 (EF 25%)  VOD prophylaxis - low dose heparin gtt (dosed at 100 units / kg / day), glutamine supplementation, Actigall BID   ID ppx - Bactrim DS through day -2 , Acyclovir 400mg po TID to start day -1, Diflucan 400 mg po daily.  GI prophylaxis - Protonix po QD   Kepivance for prevention of mucositis- days 0, +1, +2  Aggressive mouth care and skin care as per protocol. 59 year old male, with previously diagnosed acute myeloid leukemia. s/p induction chemotherapy with Daunorubicin/Cytarabine and  HIDAC consolidation chemotherapy, now admitted for Haplo-identical stem cell transplant from son. Conditioning regimen of Fludarabine, Cytoxan, and TBI. Other history includes non-ischemic cardiomyopathy (recent EF 25% 9/19/19), COLLEEN, DVT/PE, HTN, Factor V Leiden and amputation of right toe due to osteomyelitis.  Today is day -4 of therapy.    Strict I&O, daily weights, prn diuresis   Renal insufficiency- monitor daily creatinine  Will also get IVIG 0.4 g/kg (ideal body weight) every 3 weeks for GVHD ppx. Premedication with Tylenol and benadryl  TBI on day -1   Start Zarxio on day +5,  MMF and Tacrolimus. Check Tacrolimus levels on Monday and Thursday.   Anxiolytics, antinausea, antidiarrhea medications as needed   Lasix PRN while being aggressively hydrated to avoid VOD   Nutritional support, pain management.  hx non-ischemic CM, followed by Dr. Luke Lyn, last Echo 9/19 (EF 25%)  VOD prophylaxis - low dose heparin gtt (dosed at 100 units / kg / day), glutamine supplementation, Actigall BID   ID ppx - Bactrim DS through day -2 , Acyclovir 400mg po TID to start day -1, Diflucan 400 mg po daily.  GI prophylaxis - Protonix po QD   Kepivance for prevention of mucositis- days 0, +1, +2  Aggressive mouth care and skin care as per protocol. 59 year old male, with previously diagnosed acute myeloid leukemia. s/p induction chemotherapy with Daunorubicin/Cytarabine and  HIDAC consolidation chemotherapy, now admitted for Haplo-identical stem cell transplant from son. Conditioning regimen of Fludarabine, Cytoxan, and TBI. Other history includes non-ischemic cardiomyopathy (recent EF 25% 9/19/19), COLLEEN, DVT/PE, HTN, Factor V Leiden and amputation of right toe due to osteomyelitis.  Today is day -4 of therapy.    Strict I&O, daily weights, prn diuresis   Renal insufficiency- monitor daily creatinine  Will also get IVIG 0.4 g/kg (ideal body weight) every 3 weeks for GVHD ppx. Premedication with Tylenol and benadryl  TBI on day -1   Start Zarxio on day +5,  MMF and Tacrolimus. Check Tacrolimus levels on Monday and Thursday.   Anxiolytics, antinausea, antidiarrhea medications as needed   Lasix PRN while being aggressively hydrated to avoid VOD   Nutritional support, pain management.  hx non-ischemic CM, followed by Dr. Luke Lyn, last Echo 9/19 (EF 25%)  VOD prophylaxis - low dose heparin gtt (dosed at 100 units / kg / day), glutamine supplementation, Actigall BID   ID ppx - Bactrim DS through day -2 , Acyclovir 400mg po TID to start day -1; continue Voriconazole for h/o fungal infection  GI prophylaxis - Protonix po QD   Kepivance for prevention of mucositis- days 0, +1, +2  Aggressive mouth care and skin care as per protocol.

## 2019-10-06 LAB
ALBUMIN SERPL ELPH-MCNC: 3.9 G/DL — SIGNIFICANT CHANGE UP (ref 3.3–5)
ALP SERPL-CCNC: 76 U/L — SIGNIFICANT CHANGE UP (ref 40–120)
ALT FLD-CCNC: 19 U/L — SIGNIFICANT CHANGE UP (ref 10–45)
ANION GAP SERPL CALC-SCNC: 13 MMOL/L — SIGNIFICANT CHANGE UP (ref 5–17)
AST SERPL-CCNC: 18 U/L — SIGNIFICANT CHANGE UP (ref 10–40)
BASOPHILS # BLD AUTO: 0.01 K/UL — SIGNIFICANT CHANGE UP (ref 0–0.2)
BASOPHILS NFR BLD AUTO: 0.2 % — SIGNIFICANT CHANGE UP (ref 0–2)
BILIRUB SERPL-MCNC: 0.3 MG/DL — SIGNIFICANT CHANGE UP (ref 0.2–1.2)
BUN SERPL-MCNC: 26 MG/DL — HIGH (ref 7–23)
CALCIUM SERPL-MCNC: 9.1 MG/DL — SIGNIFICANT CHANGE UP (ref 8.4–10.5)
CHLORIDE SERPL-SCNC: 99 MMOL/L — SIGNIFICANT CHANGE UP (ref 96–108)
CO2 SERPL-SCNC: 23 MMOL/L — SIGNIFICANT CHANGE UP (ref 22–31)
CREAT SERPL-MCNC: 1.49 MG/DL — HIGH (ref 0.5–1.3)
EOSINOPHIL # BLD AUTO: 0.1 K/UL — SIGNIFICANT CHANGE UP (ref 0–0.5)
EOSINOPHIL NFR BLD AUTO: 2.2 % — SIGNIFICANT CHANGE UP (ref 0–6)
GLUCOSE SERPL-MCNC: 101 MG/DL — HIGH (ref 70–99)
HCT VFR BLD CALC: 27.9 % — LOW (ref 39–50)
HGB BLD-MCNC: 9.4 G/DL — LOW (ref 13–17)
IMM GRANULOCYTES NFR BLD AUTO: 1.3 % — SIGNIFICANT CHANGE UP (ref 0–1.5)
LDH SERPL L TO P-CCNC: 181 U/L — SIGNIFICANT CHANGE UP (ref 50–242)
LYMPHOCYTES # BLD AUTO: 0.1 K/UL — LOW (ref 1–3.3)
LYMPHOCYTES # BLD AUTO: 2.2 % — LOW (ref 13–44)
MAGNESIUM SERPL-MCNC: 2 MG/DL — SIGNIFICANT CHANGE UP (ref 1.6–2.6)
MCHC RBC-ENTMCNC: 31.6 PG — SIGNIFICANT CHANGE UP (ref 27–34)
MCHC RBC-ENTMCNC: 33.7 GM/DL — SIGNIFICANT CHANGE UP (ref 32–36)
MCV RBC AUTO: 93.9 FL — SIGNIFICANT CHANGE UP (ref 80–100)
MONOCYTES # BLD AUTO: 0.12 K/UL — SIGNIFICANT CHANGE UP (ref 0–0.9)
MONOCYTES NFR BLD AUTO: 2.6 % — SIGNIFICANT CHANGE UP (ref 2–14)
NEUTROPHILS # BLD AUTO: 4.2 K/UL — SIGNIFICANT CHANGE UP (ref 1.8–7.4)
NEUTROPHILS NFR BLD AUTO: 91.5 % — HIGH (ref 43–77)
NRBC # BLD: 0 /100 WBCS — SIGNIFICANT CHANGE UP (ref 0–0)
PHOSPHATE SERPL-MCNC: 5.3 MG/DL — HIGH (ref 2.5–4.5)
PLATELET # BLD AUTO: 59 K/UL — LOW (ref 150–400)
POTASSIUM SERPL-MCNC: 4.2 MMOL/L — SIGNIFICANT CHANGE UP (ref 3.5–5.3)
POTASSIUM SERPL-SCNC: 4.2 MMOL/L — SIGNIFICANT CHANGE UP (ref 3.5–5.3)
PROT SERPL-MCNC: 6.4 G/DL — SIGNIFICANT CHANGE UP (ref 6–8.3)
RBC # BLD: 2.97 M/UL — LOW (ref 4.2–5.8)
RBC # FLD: 16.9 % — HIGH (ref 10.3–14.5)
SODIUM SERPL-SCNC: 135 MMOL/L — SIGNIFICANT CHANGE UP (ref 135–145)
WBC # BLD: 4.59 K/UL — SIGNIFICANT CHANGE UP (ref 3.8–10.5)
WBC # FLD AUTO: 4.59 K/UL — SIGNIFICANT CHANGE UP (ref 3.8–10.5)

## 2019-10-06 PROCEDURE — 99232 SBSQ HOSP IP/OBS MODERATE 35: CPT

## 2019-10-06 PROCEDURE — 99291 CRITICAL CARE FIRST HOUR: CPT

## 2019-10-06 RX ADMIN — Medication 10 MILLILITER(S): at 20:07

## 2019-10-06 RX ADMIN — ONDANSETRON 8 MILLIGRAM(S): 8 TABLET, FILM COATED ORAL at 17:11

## 2019-10-06 RX ADMIN — Medication 1 TABLET(S): at 17:11

## 2019-10-06 RX ADMIN — APREPITANT 80 MILLIGRAM(S): 80 CAPSULE ORAL at 05:57

## 2019-10-06 RX ADMIN — VORICONAZOLE 200 MILLIGRAM(S): 10 INJECTION, POWDER, LYOPHILIZED, FOR SOLUTION INTRAVENOUS at 17:10

## 2019-10-06 RX ADMIN — PANTOPRAZOLE SODIUM 40 MILLIGRAM(S): 20 TABLET, DELAYED RELEASE ORAL at 05:57

## 2019-10-06 RX ADMIN — Medication 5 MILLILITER(S): at 08:30

## 2019-10-06 RX ADMIN — URSODIOL 300 MILLIGRAM(S): 250 TABLET, FILM COATED ORAL at 17:10

## 2019-10-06 RX ADMIN — Medication 1 LOZENGE: at 11:59

## 2019-10-06 RX ADMIN — HEPARIN SODIUM 3.39 UNIT(S)/HR: 5000 INJECTION INTRAVENOUS; SUBCUTANEOUS at 20:06

## 2019-10-06 RX ADMIN — Medication 1 LOZENGE: at 00:31

## 2019-10-06 RX ADMIN — HEPARIN SODIUM 3.39 UNIT(S)/HR: 5000 INJECTION INTRAVENOUS; SUBCUTANEOUS at 17:11

## 2019-10-06 RX ADMIN — SODIUM CHLORIDE 20 MILLILITER(S): 9 INJECTION INTRAMUSCULAR; INTRAVENOUS; SUBCUTANEOUS at 20:06

## 2019-10-06 RX ADMIN — Medication 1 TABLET(S): at 11:59

## 2019-10-06 RX ADMIN — SERTRALINE 50 MILLIGRAM(S): 25 TABLET, FILM COATED ORAL at 21:59

## 2019-10-06 RX ADMIN — Medication 1 LOZENGE: at 17:10

## 2019-10-06 RX ADMIN — URSODIOL 300 MILLIGRAM(S): 250 TABLET, FILM COATED ORAL at 08:35

## 2019-10-06 RX ADMIN — CHLORHEXIDINE GLUCONATE 1 APPLICATION(S): 213 SOLUTION TOPICAL at 09:54

## 2019-10-06 RX ADMIN — Medication 10 MILLILITER(S): at 17:10

## 2019-10-06 RX ADMIN — ONDANSETRON 8 MILLIGRAM(S): 8 TABLET, FILM COATED ORAL at 09:49

## 2019-10-06 RX ADMIN — Medication 1 TABLET(S): at 05:57

## 2019-10-06 RX ADMIN — SODIUM CHLORIDE 20 MILLILITER(S): 9 INJECTION INTRAMUSCULAR; INTRAVENOUS; SUBCUTANEOUS at 05:57

## 2019-10-06 RX ADMIN — Medication 1 LOZENGE: at 08:30

## 2019-10-06 RX ADMIN — Medication 12.5 MILLIGRAM(S): at 17:11

## 2019-10-06 RX ADMIN — Medication 10 MILLILITER(S): at 00:31

## 2019-10-06 RX ADMIN — Medication 5 MILLILITER(S): at 20:07

## 2019-10-06 RX ADMIN — Medication 100 MILLIGRAM(S): at 05:57

## 2019-10-06 RX ADMIN — FINASTERIDE 5 MILLIGRAM(S): 5 TABLET, FILM COATED ORAL at 11:59

## 2019-10-06 RX ADMIN — Medication 5 MILLILITER(S): at 11:59

## 2019-10-06 RX ADMIN — Medication 1 MILLIGRAM(S): at 11:59

## 2019-10-06 RX ADMIN — Medication 5 MILLILITER(S): at 17:10

## 2019-10-06 RX ADMIN — Medication 0.5 MILLIGRAM(S): at 21:58

## 2019-10-06 RX ADMIN — Medication 10 MILLILITER(S): at 08:30

## 2019-10-06 RX ADMIN — Medication 100 MILLIGRAM(S): at 14:15

## 2019-10-06 RX ADMIN — Medication 5 MILLILITER(S): at 00:31

## 2019-10-06 RX ADMIN — Medication 10 MILLILITER(S): at 12:53

## 2019-10-06 RX ADMIN — FLUDARABINE PHOSPHATE 204.72 MILLIGRAM(S): 25 INJECTION, SOLUTION INTRAVENOUS at 19:57

## 2019-10-06 RX ADMIN — SODIUM CHLORIDE 20 MILLILITER(S): 9 INJECTION INTRAMUSCULAR; INTRAVENOUS; SUBCUTANEOUS at 08:10

## 2019-10-06 RX ADMIN — TAMSULOSIN HYDROCHLORIDE 0.4 MILLIGRAM(S): 0.4 CAPSULE ORAL at 21:59

## 2019-10-06 RX ADMIN — Medication 1 LOZENGE: at 20:07

## 2019-10-06 RX ADMIN — VORICONAZOLE 200 MILLIGRAM(S): 10 INJECTION, POWDER, LYOPHILIZED, FOR SOLUTION INTRAVENOUS at 05:57

## 2019-10-06 NOTE — PROGRESS NOTE ADULT - SUBJECTIVE AND OBJECTIVE BOX
[facility] - Ede Zamudio, Riki, Edgar, Gem, Tabitha Garcia  Office Number:  [phone]    Patient resting comfortably in bed in NAD.  Laying flat with no respiratory distress.  No complaints of chest pain, dyspnea, palpitations, PND, or orthopnea.    F/U for:  Cardiomyopathy      MEDICATIONS  (STANDING):  acetaminophen   Tablet .. 650 milliGRAM(s) Oral every 6 hours  ALPRAZolam 0.5 milliGRAM(s) Oral at bedtime  aprepitant 80 milliGRAM(s) Oral every 24 hours  Biotene Dry Mouth Oral Rinse 5 milliLiter(s) Swish and Spit five times a day  chlorhexidine 4% Liquid 1 Application(s) Topical <User Schedule>  clotrimazole Lozenge 1 Lozenge Oral five times a day  docusate sodium 100 milliGRAM(s) Oral three times a day  enalapril 2.5 milliGRAM(s) Oral daily  finasteride 5 milliGRAM(s) Oral daily  fludarabine IVPB (eMAR) 59 milliGRAM(s) IV Intermittent every 24 hours  folic acid 1 milliGRAM(s) Oral daily  heparin  Infusion 339 Unit(s)/Hr (3.39 mL/Hr) IV Continuous <Continuous>  metoprolol succinate ER 12.5 milliGRAM(s) Oral two times a day  multivitamin 1 Tablet(s) Oral daily  ondansetron Injectable 8 milliGRAM(s) IV Push every 8 hours  pantoprazole    Tablet 40 milliGRAM(s) Oral before breakfast  senna 2 Tablet(s) Oral at bedtime  sertraline 50 milliGRAM(s) Oral at bedtime  sodium bicarbonate Mouth Rinse 10 milliLiter(s) Swish and Spit five times a day  sodium chloride 0.45%. 1000 milliLiter(s) (200 mL/Hr) IV Continuous <Continuous>  sodium chloride 0.9%. 1000 milliLiter(s) (20 mL/Hr) IV Continuous <Continuous>  tamsulosin 0.4 milliGRAM(s) Oral at bedtime  trimethoprim  160 mG/sulfamethoxazole 800 mG 1 Tablet(s) Oral every 12 hours  ursodiol Capsule 300 milliGRAM(s) Oral two times a day with meals  voriconazole 200 milliGRAM(s) Oral every 12 hours    MEDICATIONS  (PRN):  acetaminophen   Tablet .. 650 milliGRAM(s) Oral every 6 hours PRN Temp greater or equal to 38C (100.4F), Mild Pain (1 - 3)  diphenhydrAMINE   Injectable 25 milliGRAM(s) IV Push every 4 hours PRN Allergy symptoms  metoclopramide Injectable 10 milliGRAM(s) IV Push every 6 hours PRN Nausea and/or Vomiting  sodium chloride 0.9% lock flush 10 milliLiter(s) IV Push every 1 hour PRN Pre/post blood products, medications, blood draw, and to maintain line patency      Allergies    No Known Allergies        Vital Signs Last 24 Hrs  T(C): 36.2 (06 Oct 2019 05:55), Max: 36.9 (05 Oct 2019 13:38)  T(F): 97.2 (06 Oct 2019 05:55), Max: 98.4 (05 Oct 2019 13:38)  HR: 81 (06 Oct 2019 05:55) (80 - 99)  BP: 104/64 (06 Oct 2019 05:55) (98/61 - 120/82)  BP(mean): --  RR: 18 (06 Oct 2019 05:55) (18 - 18)  SpO2: 94% (06 Oct 2019 05:55) (94% - 99%)    I&O's Summary    05 Oct 2019 07:01  -  06 Oct 2019 07:00  --------------------------------------------------------  IN: 1525.9 mL / OUT: 5825 mL / NET: -4299.1 mL        ON EXAM:    General: NAD, awake and alert, oriented x 3  HEENT: Mucous membranes are moist, anicteric  Lungs: Non-labored, breath sounds are clear bilaterally, No wheezing, rales or rhonchi  Cardiovascular: Regular, S1 and S2, no murmurs, rubs, or gallops  Gastrointestinal: Bowel Sounds present, soft, nontender.   Lymph: No peripheral edema. No lymphadenopathy.  Skin: No rashes or ulcers  Psych:  Mood & affect appropriate    LABS: All Labs Reviewed:                        8.8    5.27  )-----------( 58       ( 05 Oct 2019 06:44 )             26.3                         9.3    7.22  )-----------( 63       ( 04 Oct 2019 07:35 )             27.1                         9.9    8.36  )-----------( 63       ( 03 Oct 2019 18:01 )             28.9     05 Oct 2019 06:44    136    |  102    |  19     ----------------------------<  99     4.0     |  21     |  1.35   04 Oct 2019 07:34    134    |  101    |  17     ----------------------------<  103    3.7     |  21     |  1.19   03 Oct 2019 18:01    137    |  106    |  19     ----------------------------<  97     4.7     |  22     |  1.01     Ca    8.3        05 Oct 2019 06:44  Ca    8.5        04 Oct 2019 07:34  Ca    8.8        03 Oct 2019 18:01  Phos  4.3       05 Oct 2019 06:44  Phos  3.7       04 Oct 2019 07:34  Phos  2.9       03 Oct 2019 18:01  Mg     1.7       05 Oct 2019 06:44  Mg     1.6       04 Oct 2019 07:34  Mg     1.9       03 Oct 2019 18:01    TPro  5.7    /  Alb  3.5    /  TBili  0.2    /  DBili  x      /  AST  25     /  ALT  22     /  AlkPhos  69     05 Oct 2019 06:44  TPro  5.3    /  Alb  3.4    /  TBili  0.2    /  DBili  <0.1   /  AST  34     /  ALT  22     /  AlkPhos  70     04 Oct 2019 07:34  TPro  6.0    /  Alb  3.8    /  TBili  0.2    /  DBili  <0.1   /  AST  46     /  ALT  21     /  AlkPhos  69     03 Oct 2019 18:01

## 2019-10-06 NOTE — PROGRESS NOTE ADULT - ATTENDING COMMENTS
59 year old male, with previously diagnosed acute myeloid leukemia. s/p induction chemotherapy with Daunorubicin/Cytarabine and  HIDAC consolidation chemotherapy, now admitted for Haplo-identical stem cell transplant from son. Conditioning regimen of Fludarabine, Cytoxan, and TBI. Other history includes non-ischemic cardiomyopathy (recent EF 25% 9/19/19), COLLEEN, DVT/PE, HTN, Factor V Leiden and amputation of right toe due to osteomyelitis.  Today is day -4 of therapy.    Strict I&O, daily weights, prn diuresis   Renal insufficiency- monitor daily creatinine  Will also get IVIG 0.4 g/kg (ideal body weight) every 3 weeks for GVHD ppx. Premedication with Tylenol and benadryl  TBI on day -1   Start Zarxio on day +5,  MMF and Tacrolimus. Check Tacrolimus levels on Monday and Thursday.   Anxiolytics, antinausea, antidiarrhea medications as needed   Lasix PRN while being aggressively hydrated to avoid VOD   Nutritional support, pain management.  hx non-ischemic CM, followed by Dr. Luke Lyn, last Echo 9/19 (EF 25%)  VOD prophylaxis - low dose heparin gtt (dosed at 100 units / kg / day), glutamine supplementation, Actigall BID   ID ppx - Bactrim DS through day -2 , Acyclovir 400mg po TID to start day -1, Diflucan 400 mg po daily.  GI prophylaxis - Protonix po QD   Kepivance for prevention of mucositis- days 0, +1, +2  Aggressive mouth care and skin care as per protocol. 59 year old male, with previously diagnosed acute myeloid leukemia. s/p induction chemotherapy with Daunorubicin/Cytarabine and  HIDAC consolidation chemotherapy, now admitted for Haplo-identical stem cell transplant from son. Conditioning regimen of Fludarabine, Cytoxan, and TBI. Other history includes non-ischemic cardiomyopathy (recent EF 25% 9/19/19), COLLEEN, DVT/PE, HTN, Factor V Leiden and amputation of right toe due to osteomyelitis.  Today is day -3 of therapy.    Strict I&O, daily weights, prn diuresis   Renal insufficiency- monitor daily creatinine  Will also get IVIG 0.4 g/kg (ideal body weight) every 3 weeks for GVHD ppx. Premedication with Tylenol and benadryl  TBI on day -1   Start Zarxio on day +5,  MMF and Tacrolimus. Check Tacrolimus levels on Monday and Thursday.   Anxiolytics, antinausea, antidiarrhea medications as needed   Lasix PRN while being aggressively hydrated to avoid VOD   Nutritional support, pain management.  hx non-ischemic CM, followed by Dr. Luke Lyn, last Echo 9/19 (EF 25%)  VOD prophylaxis - low dose heparin gtt (dosed at 100 units / kg / day), glutamine supplementation, Actigall BID   ID ppx - Bactrim DS through day -2 , Acyclovir 400mg po TID to start day -1; continue Voriconazole for h/o fungal infection  GI prophylaxis - Protonix po QD   Kepivance for prevention of mucositis- days 0, +1, +2  Aggressive mouth care and skin care as per protocol.

## 2019-10-06 NOTE — PROGRESS NOTE ADULT - SUBJECTIVE AND OBJECTIVE BOX
HPC Transplant Team                                                      Critical / Counseling Time Provided: 30 minutes                                                                                                                                                        Chief Complaint: Allogeneic stem cell transplant    S: Patient seen and examined with HPC Transplant Team:     no complaint   +Constipation x 3 days  Denies mouth / tongue / throat pain, dyspnea, cough, nausea, vomiting, diarrhea, abdominal pain         O: Vitals:   Vital Signs Last 24 Hrs  T(C): 36.2 (06 Oct 2019 05:55), Max: 36.9 (05 Oct 2019 13:38)  T(F): 97.2 (06 Oct 2019 05:55), Max: 98.4 (05 Oct 2019 13:38)  HR: 81 (06 Oct 2019 05:55) (80 - 99)  BP: 104/64 (06 Oct 2019 05:55) (98/61 - 120/82)  BP(mean): --  RR: 18 (06 Oct 2019 05:55) (18 - 18)  SpO2: 94% (06 Oct 2019 05:55) (94% - 99%)    Admit weight:   Daily     Daily Weight in k.7 (05 Oct 2019 09:00)    Intake / Output:   10-05 @ 07:01  -  10-06 @ 07:00  --------------------------------------------------------  IN: 1525.9 mL / OUT: 5825 mL / NET: -4299.1 mL      PE:   Oropharynx: no ulceration or erythema  Oral Mucositis:      none                                                  Grade:   CVS: reg S1 S2  Lungs: CTA  Abdomen: soft, + normoactive BS, NT, ND  Extremities: No C/C/E  Gastric Mucositis:     none                                             Grade:   Intestinal Mucositis:      none                                        Grade:   Skin: no rash  TLC:  C/D/I  Neuro: Alert and oriented x3, no focal deficit   Pain: none          Labs:   CBC Full  -  ( 06 Oct 2019 06:48 )  WBC Count : 4.59 K/uL  Hemoglobin : 9.4 g/dL  Hematocrit : 27.9 %  Platelet Count - Automated : 59 K/uL  Mean Cell Volume : 93.9 fl  Mean Cell Hemoglobin : 31.6 pg  Mean Cell Hemoglobin Concentration : 33.7 gm/dL  Auto Neutrophil # : 4.20 K/uL  Auto Lymphocyte # : 0.10 K/uL  Auto Monocyte # : 0.12 K/uL  Auto Eosinophil # : 0.10 K/uL  Auto Basophil # : 0.01 K/uL  Auto Neutrophil % : 91.5 %  Auto Lymphocyte % : 2.2 %  Auto Monocyte % : 2.6 %  Auto Eosinophil % : 2.2 %  Auto Basophil % : 0.2 %                          9.4    4.59  )-----------( 59       ( 06 Oct 2019 06:48 )             27.9     10-06    135  |  99  |  26<H>  ----------------------------<  101<H>  4.2   |  23  |  1.49<H>    Ca    9.1      06 Oct 2019 06:48  Phos  5.3     10-06  Mg     2.0     10-06    TPro  6.4  /  Alb  3.9  /  TBili  0.3  /  DBili  x   /  AST  18  /  ALT  19  /  AlkPhos  76  10-06      LIVER FUNCTIONS - ( 06 Oct 2019 06:48 )  Alb: 3.9 g/dL / Pro: 6.4 g/dL / ALK PHOS: 76 U/L / ALT: 19 U/L / AST: 18 U/L / GGT: x           Lactate Dehydrogenase, Serum: 181 U/L (10-06 @ 06:48)        Meds:   Antimicrobials:   clotrimazole Lozenge 1 Lozenge Oral five times a day  trimethoprim  160 mG/sulfamethoxazole 800 mG 1 Tablet(s) Oral every 12 hours  voriconazole 200 milliGRAM(s) Oral every 12 hours      Heme / Onc:   fludarabine IVPB (eMAR) 59 milliGRAM(s) IV Intermittent every 24 hours  heparin  Infusion 339 Unit(s)/Hr IV Continuous <Continuous>      GI:  docusate sodium 100 milliGRAM(s) Oral three times a day  pantoprazole    Tablet 40 milliGRAM(s) Oral before breakfast  senna 2 Tablet(s) Oral at bedtime  sodium bicarbonate Mouth Rinse 10 milliLiter(s) Swish and Spit five times a day  ursodiol Capsule 300 milliGRAM(s) Oral two times a day with meals      Cardiovascular:   enalapril 2.5 milliGRAM(s) Oral daily  metoprolol succinate ER 12.5 milliGRAM(s) Oral two times a day  tamsulosin 0.4 milliGRAM(s) Oral at bedtime      Other medications:   acetaminophen   Tablet .. 650 milliGRAM(s) Oral every 6 hours  ALPRAZolam 0.5 milliGRAM(s) Oral at bedtime  aprepitant 80 milliGRAM(s) Oral every 24 hours  Biotene Dry Mouth Oral Rinse 5 milliLiter(s) Swish and Spit five times a day  chlorhexidine 4% Liquid 1 Application(s) Topical <User Schedule>  finasteride 5 milliGRAM(s) Oral daily  folic acid 1 milliGRAM(s) Oral daily  multivitamin 1 Tablet(s) Oral daily  ondansetron Injectable 8 milliGRAM(s) IV Push every 8 hours  sertraline 50 milliGRAM(s) Oral at bedtime  sodium chloride 0.45%. 1000 milliLiter(s) IV Continuous <Continuous>  sodium chloride 0.9%. 1000 milliLiter(s) IV Continuous <Continuous>      PRN:   acetaminophen   Tablet .. 650 milliGRAM(s) Oral every 6 hours PRN  diphenhydrAMINE   Injectable 25 milliGRAM(s) IV Push every 4 hours PRN  metoclopramide Injectable 10 milliGRAM(s) IV Push every 6 hours PRN  sodium chloride 0.9% lock flush 10 milliLiter(s) IV Push every 1 hour PRN      Mr. Delong is a 59 year old male, with previously diagnosed acute myeloid leukemia. s/p induction chemotherapy with Daunorubicin/Cytarabine and  HIDAC consolidation chemotherapy, now admitted for Haplo-identical stem cell transplant Day -3 today   Conditioning regimen of Fludarabine, Cytoxan, and TBI.   Other history includes non-ischemic cardiomyopathy (recent EF 25% 19), COLLEEN, DVT/PE, HTN, Factor V Leiden and amputation of right toe due to osteomyelitis.   Lasix PRN for weight gain    1. Infectious Disease:   clotrimazole Lozenge 1 Lozenge Oral five times a day  trimethoprim  160 mG/sulfamethoxazole 800 mG 1 Tablet(s) Oral every 12 hours  voriconazole 200 milliGRAM(s) Oral every 12 hours    2. VOD Prophylaxis: Actigall, Glutamine, Heparin (dosed at 100 units / kg / day)     3. GI Prophylaxis:  Protonix    4. Mouthcare - NS / NaHCO3 rinses, Mycelex, Biotene; Skin care     5. GVHD prophylaxis     6. Transfuse & replete electrolytes prn      7. IV hydration, daily weights, strict I&O, prn diuresis     8. PO intake as tolerated, nutrition follow up as needed, MVI, folic acid     9. Antiemetics, anti-diarrhea medications:      10. OOB as tolerated, physical therapy consult if needed     11. Monitor coags / fibrinogen 2x week, vitamin K as needed     12. Monitor closely for clinical changes, monitor for fevers     13. Emotional support provided, plan of care discussed and questions addressed     14. Patient education done regarding chemotherapy prep, plan of care, restrictions and discharge planning     15. Continue regular social work input     I have written the above note for Dr. Quiñones  who performed service with me in the room.   Brittney Mcmahon NP ([phone])    I have seen and examined patient with NP, I agree with above note as scribed. HPC Transplant Team                                                      Critical / Counseling Time Provided: 30 minutes                                                                                                                                                        Chief Complaint: Allogeneic stem cell transplant    S: Patient seen and examined with HPC Transplant Team:     no complaint   +Constipation resolved  Denies mouth / tongue / throat pain, dyspnea, cough, nausea, vomiting, diarrhea, abdominal pain         O: Vitals:   Vital Signs Last 24 Hrs  T(C): 36.2 (06 Oct 2019 05:55), Max: 36.9 (05 Oct 2019 13:38)  T(F): 97.2 (06 Oct 2019 05:55), Max: 98.4 (05 Oct 2019 13:38)  HR: 81 (06 Oct 2019 05:55) (80 - 99)  BP: 104/64 (06 Oct 2019 05:55) (98/61 - 120/82)  BP(mean): --  RR: 18 (06 Oct 2019 05:55) (18 - 18)  SpO2: 94% (06 Oct 2019 05:55) (94% - 99%)      Admit weight: 81.5  Daily     Daily Weight in k.7 (05 Oct 2019 09:00)    Intake / Output:   10-05 @ 07:01  -  10-06 @ 07:00  --------------------------------------------------------  IN: 1525.9 mL / OUT: 5825 mL / NET: -4299.1 mL      PE:   Oropharynx: no ulceration or erythema  Oral Mucositis:      none                                                  Grade:   CVS: reg S1 S2  Lungs: CTA  Abdomen: soft, + normoactive BS, NT, ND  Extremities: No C/C/E  Gastric Mucositis:     none                                             Grade:   Intestinal Mucositis:      none                                        Grade:   Skin: no rash  TLC:  C/D/I  Neuro: Alert and oriented x3, no focal deficit   Pain: none          Labs:   CBC Full  -  ( 06 Oct 2019 06:48 )  WBC Count : 4.59 K/uL  Hemoglobin : 9.4 g/dL  Hematocrit : 27.9 %  Platelet Count - Automated : 59 K/uL  Mean Cell Volume : 93.9 fl  Mean Cell Hemoglobin : 31.6 pg  Mean Cell Hemoglobin Concentration : 33.7 gm/dL  Auto Neutrophil # : 4.20 K/uL  Auto Lymphocyte # : 0.10 K/uL  Auto Monocyte # : 0.12 K/uL  Auto Eosinophil # : 0.10 K/uL  Auto Basophil # : 0.01 K/uL  Auto Neutrophil % : 91.5 %  Auto Lymphocyte % : 2.2 %  Auto Monocyte % : 2.6 %  Auto Eosinophil % : 2.2 %  Auto Basophil % : 0.2 %                          9.4    4.59  )-----------( 59       ( 06 Oct 2019 06:48 )             27.9     10-06    135  |  99  |  26<H>  ----------------------------<  101<H>  4.2   |  23  |  1.49<H>    Ca    9.1      06 Oct 2019 06:48  Phos  5.3     10-06  Mg     2.0     10-06    TPro  6.4  /  Alb  3.9  /  TBili  0.3  /  DBili  x   /  AST  18  /  ALT  19  /  AlkPhos  76  10-06      LIVER FUNCTIONS - ( 06 Oct 2019 06:48 )  Alb: 3.9 g/dL / Pro: 6.4 g/dL / ALK PHOS: 76 U/L / ALT: 19 U/L / AST: 18 U/L / GGT: x           Lactate Dehydrogenase, Serum: 181 U/L (10-06 @ 06:48)        Meds:   Antimicrobials:   clotrimazole Lozenge 1 Lozenge Oral five times a day  trimethoprim  160 mG/sulfamethoxazole 800 mG 1 Tablet(s) Oral every 12 hours  voriconazole 200 milliGRAM(s) Oral every 12 hours      Heme / Onc:   fludarabine IVPB (eMAR) 59 milliGRAM(s) IV Intermittent every 24 hours  heparin  Infusion 339 Unit(s)/Hr IV Continuous <Continuous>      GI:  docusate sodium 100 milliGRAM(s) Oral three times a day  pantoprazole    Tablet 40 milliGRAM(s) Oral before breakfast  senna 2 Tablet(s) Oral at bedtime  sodium bicarbonate Mouth Rinse 10 milliLiter(s) Swish and Spit five times a day  ursodiol Capsule 300 milliGRAM(s) Oral two times a day with meals      Cardiovascular:   enalapril 2.5 milliGRAM(s) Oral daily  metoprolol succinate ER 12.5 milliGRAM(s) Oral two times a day  tamsulosin 0.4 milliGRAM(s) Oral at bedtime      Other medications:   acetaminophen   Tablet .. 650 milliGRAM(s) Oral every 6 hours  ALPRAZolam 0.5 milliGRAM(s) Oral at bedtime  aprepitant 80 milliGRAM(s) Oral every 24 hours  Biotene Dry Mouth Oral Rinse 5 milliLiter(s) Swish and Spit five times a day  chlorhexidine 4% Liquid 1 Application(s) Topical <User Schedule>  finasteride 5 milliGRAM(s) Oral daily  folic acid 1 milliGRAM(s) Oral daily  multivitamin 1 Tablet(s) Oral daily  ondansetron Injectable 8 milliGRAM(s) IV Push every 8 hours  sertraline 50 milliGRAM(s) Oral at bedtime  sodium chloride 0.45%. 1000 milliLiter(s) IV Continuous <Continuous>  sodium chloride 0.9%. 1000 milliLiter(s) IV Continuous <Continuous>      PRN:   acetaminophen   Tablet .. 650 milliGRAM(s) Oral every 6 hours PRN  diphenhydrAMINE   Injectable 25 milliGRAM(s) IV Push every 4 hours PRN  metoclopramide Injectable 10 milliGRAM(s) IV Push every 6 hours PRN  sodium chloride 0.9% lock flush 10 milliLiter(s) IV Push every 1 hour PRN      Mr. Delong is a 59 year old male, with previously diagnosed acute myeloid leukemia. s/p induction chemotherapy with Daunorubicin/Cytarabine and  HIDAC consolidation chemotherapy, now admitted for Haplo-identical stem cell transplant Day -3 today   Conditioning regimen of Fludarabine, Cytoxan, and TBI.   Other history includes non-ischemic cardiomyopathy (recent EF 25% 19), COLLEEN, DVT/PE, HTN, Factor V Leiden and amputation of right toe due to osteomyelitis.   renal insufficiency due to diuresis, monitor closely  Xarelto for h/o DVT/PE   held due to pending PLT <50 K and continue low dose Heparin gtt     1. Infectious Disease:   clotrimazole Lozenge 1 Lozenge Oral five times a day  trimethoprim  160 mG/sulfamethoxazole 800 mG 1 Tablet(s) Oral every 12 hours  voriconazole 200 milliGRAM(s) Oral every 12 hours    2. VOD Prophylaxis: Actigall, Glutamine, Heparin (dosed at 100 units / kg / day)     3. GI Prophylaxis:  Protonix    4. Mouthcare - NS / NaHCO3 rinses, Mycelex, Biotene; Skin care     5. GVHD prophylaxis     6. Transfuse & replete electrolytes prn      7. IV hydration, daily weights, strict I&O, prn diuresis     8. PO intake as tolerated, nutrition follow up as needed, MVI, folic acid     9. Antiemetics, anti-diarrhea medications:      10. OOB as tolerated, physical therapy consult if needed     11. Monitor coags / fibrinogen 2x week, vitamin K as needed     12. Monitor closely for clinical changes, monitor for fevers     13. Emotional support provided, plan of care discussed and questions addressed     14. Patient education done regarding chemotherapy prep, plan of care, restrictions and discharge planning     15. Continue regular social work input     I have written the above note for Dr. Quiñones  who performed service with me in the room.   Brittney Mcmahon NP ([phone])    I have seen and examined patient with NP, I agree with above note as scribed. HPC Transplant Team                                                      Critical / Counseling Time Provided: 30 minutes                                                                                                                                                        Chief Complaint: Allogeneic stem cell transplant    S: Patient seen and examined with HPC Transplant Team:     no complaints  +Constipation resolved  Denies mouth / tongue / throat pain, dyspnea, cough, nausea, vomiting, diarrhea, abdominal pain         O: Vitals:   Vital Signs Last 24 Hrs  T(C): 36.2 (06 Oct 2019 05:55), Max: 36.9 (05 Oct 2019 13:38)  T(F): 97.2 (06 Oct 2019 05:55), Max: 98.4 (05 Oct 2019 13:38)  HR: 81 (06 Oct 2019 05:55) (80 - 99)  BP: 104/64 (06 Oct 2019 05:55) (98/61 - 120/82)  BP(mean): --  RR: 18 (06 Oct 2019 05:55) (18 - 18)  SpO2: 94% (06 Oct 2019 05:55) (94% - 99%)      Admit weight: 81.5  Daily     Daily Weight in k.7 (05 Oct 2019 09:00)    Intake / Output:   10-05 @ 07:01  -  10-06 @ 07:00  --------------------------------------------------------  IN: 1525.9 mL / OUT: 5825 mL / NET: -4299.1 mL      PE:   Oropharynx: no ulceration or erythema  Oral Mucositis:      none                                                  Grade:   CVS: reg S1 S2  Lungs: CTA  Abdomen: soft, + normoactive BS, NT, ND  Extremities: No C/C/E  Gastric Mucositis:     none                                             Grade:   Intestinal Mucositis:      none                                        Grade:   Skin: no rash  TLC:  C/D/I  Neuro: Alert and oriented x3, no focal deficit   Pain: none          Labs:   CBC Full  -  ( 06 Oct 2019 06:48 )  WBC Count : 4.59 K/uL  Hemoglobin : 9.4 g/dL  Hematocrit : 27.9 %  Platelet Count - Automated : 59 K/uL  Mean Cell Volume : 93.9 fl  Mean Cell Hemoglobin : 31.6 pg  Mean Cell Hemoglobin Concentration : 33.7 gm/dL  Auto Neutrophil # : 4.20 K/uL  Auto Lymphocyte # : 0.10 K/uL  Auto Monocyte # : 0.12 K/uL  Auto Eosinophil # : 0.10 K/uL  Auto Basophil # : 0.01 K/uL  Auto Neutrophil % : 91.5 %  Auto Lymphocyte % : 2.2 %  Auto Monocyte % : 2.6 %  Auto Eosinophil % : 2.2 %  Auto Basophil % : 0.2 %                          9.4    4.59  )-----------( 59       ( 06 Oct 2019 06:48 )             27.9     10-06    135  |  99  |  26<H>  ----------------------------<  101<H>  4.2   |  23  |  1.49<H>    Ca    9.1      06 Oct 2019 06:48  Phos  5.3     10-06  Mg     2.0     10-06    TPro  6.4  /  Alb  3.9  /  TBili  0.3  /  DBili  x   /  AST  18  /  ALT  19  /  AlkPhos  76  10-06      LIVER FUNCTIONS - ( 06 Oct 2019 06:48 )  Alb: 3.9 g/dL / Pro: 6.4 g/dL / ALK PHOS: 76 U/L / ALT: 19 U/L / AST: 18 U/L / GGT: x           Lactate Dehydrogenase, Serum: 181 U/L (10-06 @ 06:48)        Meds:   Antimicrobials:   clotrimazole Lozenge 1 Lozenge Oral five times a day  trimethoprim  160 mG/sulfamethoxazole 800 mG 1 Tablet(s) Oral every 12 hours  voriconazole 200 milliGRAM(s) Oral every 12 hours      Heme / Onc:   fludarabine IVPB (eMAR) 59 milliGRAM(s) IV Intermittent every 24 hours  heparin  Infusion 339 Unit(s)/Hr IV Continuous <Continuous>      GI:  docusate sodium 100 milliGRAM(s) Oral three times a day  pantoprazole    Tablet 40 milliGRAM(s) Oral before breakfast  senna 2 Tablet(s) Oral at bedtime  sodium bicarbonate Mouth Rinse 10 milliLiter(s) Swish and Spit five times a day  ursodiol Capsule 300 milliGRAM(s) Oral two times a day with meals      Cardiovascular:   enalapril 2.5 milliGRAM(s) Oral daily  metoprolol succinate ER 12.5 milliGRAM(s) Oral two times a day  tamsulosin 0.4 milliGRAM(s) Oral at bedtime      Other medications:   acetaminophen   Tablet .. 650 milliGRAM(s) Oral every 6 hours  ALPRAZolam 0.5 milliGRAM(s) Oral at bedtime  aprepitant 80 milliGRAM(s) Oral every 24 hours  Biotene Dry Mouth Oral Rinse 5 milliLiter(s) Swish and Spit five times a day  chlorhexidine 4% Liquid 1 Application(s) Topical <User Schedule>  finasteride 5 milliGRAM(s) Oral daily  folic acid 1 milliGRAM(s) Oral daily  multivitamin 1 Tablet(s) Oral daily  ondansetron Injectable 8 milliGRAM(s) IV Push every 8 hours  sertraline 50 milliGRAM(s) Oral at bedtime  sodium chloride 0.45%. 1000 milliLiter(s) IV Continuous <Continuous>  sodium chloride 0.9%. 1000 milliLiter(s) IV Continuous <Continuous>      PRN:   acetaminophen   Tablet .. 650 milliGRAM(s) Oral every 6 hours PRN  diphenhydrAMINE   Injectable 25 milliGRAM(s) IV Push every 4 hours PRN  metoclopramide Injectable 10 milliGRAM(s) IV Push every 6 hours PRN  sodium chloride 0.9% lock flush 10 milliLiter(s) IV Push every 1 hour PRN      Mr. Delong is a 59 year old male, with previously diagnosed acute myeloid leukemia. s/p induction chemotherapy with Daunorubicin/Cytarabine and  HIDAC consolidation chemotherapy, now admitted for Haplo-identical stem cell transplant Day -3 today   Conditioning regimen of Fludarabine, Cytoxan, and TBI.   Other history includes non-ischemic cardiomyopathy (recent EF 25% 19), COLLEEN, DVT/PE, HTN, Factor V Leiden and amputation of right toe due to osteomyelitis.   renal insufficiency due to diuresis, monitor closely  Xarelto for h/o DVT/PE   held due to pending PLT <50 K and continue low dose Heparin gtt     1. Infectious Disease:   clotrimazole Lozenge 1 Lozenge Oral five times a day  trimethoprim  160 mG/sulfamethoxazole 800 mG 1 Tablet(s) Oral every 12 hours  voriconazole 200 milliGRAM(s) Oral every 12 hours    2. VOD Prophylaxis: Actigall, Glutamine, Heparin (dosed at 100 units / kg / day)     3. GI Prophylaxis:  Protonix    4. Mouthcare - NS / NaHCO3 rinses, Mycelex, Biotene; Skin care     5. GVHD prophylaxis     6. Transfuse & replete electrolytes prn      7. IV hydration, daily weights, strict I&O, prn diuresis     8. PO intake as tolerated, nutrition follow up as needed, MVI, folic acid     9. Antiemetics, anti-diarrhea medications:      10. OOB as tolerated, physical therapy consult if needed     11. Monitor coags / fibrinogen 2x week, vitamin K as needed     12. Monitor closely for clinical changes, monitor for fevers     13. Emotional support provided, plan of care discussed and questions addressed     14. Patient education done regarding chemotherapy prep, plan of care, restrictions and discharge planning     15. Continue regular social work input     I have written the above note for Dr. Quiñones  who performed service with me in the room.   Brittney Mcmahon NP ([phone])    I have seen and examined patient with NP, I agree with above note as scribed. HPC Transplant Team                                                      Critical / Counseling Time Provided: 30 minutes                                                                                                                                                        Chief Complaint: Allogeneic stem cell transplant    S: Patient seen and examined with HPC Transplant Team:     no complaints  +Constipation resolved  Denies mouth / tongue / throat pain, dyspnea, cough, nausea, vomiting, diarrhea, abdominal pain         O: Vitals:   Vital Signs Last 24 Hrs  T(C): 36.2 (06 Oct 2019 05:55), Max: 36.9 (05 Oct 2019 13:38)  T(F): 97.2 (06 Oct 2019 05:55), Max: 98.4 (05 Oct 2019 13:38)  HR: 81 (06 Oct 2019 05:55) (80 - 99)  BP: 104/64 (06 Oct 2019 05:55) (98/61 - 120/82)  BP(mean): --  RR: 18 (06 Oct 2019 05:55) (18 - 18)  SpO2: 94% (06 Oct 2019 05:55) (94% - 99%)      Admit weight: 81.5  Daily   78.4 today  Daily Weight in k.7 (05 Oct 2019 09:00)    Intake / Output:   10-05 @ 07:01  -  10-06 @ 07:00  --------------------------------------------------------  IN: 1525.9 mL / OUT: 5825 mL / NET: -4299.1 mL      PE:   Oropharynx: no ulceration or erythema  Oral Mucositis:      none                                                  Grade:   CVS: reg S1 S2  Lungs: CTA  Abdomen: soft, + normoactive BS, NT, ND  Extremities: No C/C/E  Gastric Mucositis:     none                                             Grade:   Intestinal Mucositis:      none                                        Grade:   Skin: no rash  TLC:  C/D/I  Neuro: Alert and oriented x3, no focal deficit   Pain: none          Labs:   CBC Full  -  ( 06 Oct 2019 06:48 )  WBC Count : 4.59 K/uL  Hemoglobin : 9.4 g/dL  Hematocrit : 27.9 %  Platelet Count - Automated : 59 K/uL  Mean Cell Volume : 93.9 fl  Mean Cell Hemoglobin : 31.6 pg  Mean Cell Hemoglobin Concentration : 33.7 gm/dL  Auto Neutrophil # : 4.20 K/uL  Auto Lymphocyte # : 0.10 K/uL  Auto Monocyte # : 0.12 K/uL  Auto Eosinophil # : 0.10 K/uL  Auto Basophil # : 0.01 K/uL  Auto Neutrophil % : 91.5 %  Auto Lymphocyte % : 2.2 %  Auto Monocyte % : 2.6 %  Auto Eosinophil % : 2.2 %  Auto Basophil % : 0.2 %                          9.4    4.59  )-----------( 59       ( 06 Oct 2019 06:48 )             27.9     10-06    135  |  99  |  26<H>  ----------------------------<  101<H>  4.2   |  23  |  1.49<H>    Ca    9.1      06 Oct 2019 06:48  Phos  5.3     10-06  Mg     2.0     10-06    TPro  6.4  /  Alb  3.9  /  TBili  0.3  /  DBili  x   /  AST  18  /  ALT  19  /  AlkPhos  76  10-06      LIVER FUNCTIONS - ( 06 Oct 2019 06:48 )  Alb: 3.9 g/dL / Pro: 6.4 g/dL / ALK PHOS: 76 U/L / ALT: 19 U/L / AST: 18 U/L / GGT: x           Lactate Dehydrogenase, Serum: 181 U/L (10-06 @ 06:48)        Meds:   Antimicrobials:   clotrimazole Lozenge 1 Lozenge Oral five times a day  trimethoprim  160 mG/sulfamethoxazole 800 mG 1 Tablet(s) Oral every 12 hours  voriconazole 200 milliGRAM(s) Oral every 12 hours      Heme / Onc:   fludarabine IVPB (eMAR) 59 milliGRAM(s) IV Intermittent every 24 hours  heparin  Infusion 339 Unit(s)/Hr IV Continuous <Continuous>      GI:  docusate sodium 100 milliGRAM(s) Oral three times a day  pantoprazole    Tablet 40 milliGRAM(s) Oral before breakfast  senna 2 Tablet(s) Oral at bedtime  sodium bicarbonate Mouth Rinse 10 milliLiter(s) Swish and Spit five times a day  ursodiol Capsule 300 milliGRAM(s) Oral two times a day with meals      Cardiovascular:   enalapril 2.5 milliGRAM(s) Oral daily  metoprolol succinate ER 12.5 milliGRAM(s) Oral two times a day  tamsulosin 0.4 milliGRAM(s) Oral at bedtime      Other medications:   acetaminophen   Tablet .. 650 milliGRAM(s) Oral every 6 hours  ALPRAZolam 0.5 milliGRAM(s) Oral at bedtime  aprepitant 80 milliGRAM(s) Oral every 24 hours  Biotene Dry Mouth Oral Rinse 5 milliLiter(s) Swish and Spit five times a day  chlorhexidine 4% Liquid 1 Application(s) Topical <User Schedule>  finasteride 5 milliGRAM(s) Oral daily  folic acid 1 milliGRAM(s) Oral daily  multivitamin 1 Tablet(s) Oral daily  ondansetron Injectable 8 milliGRAM(s) IV Push every 8 hours  sertraline 50 milliGRAM(s) Oral at bedtime  sodium chloride 0.45%. 1000 milliLiter(s) IV Continuous <Continuous>  sodium chloride 0.9%. 1000 milliLiter(s) IV Continuous <Continuous>      PRN:   acetaminophen   Tablet .. 650 milliGRAM(s) Oral every 6 hours PRN  diphenhydrAMINE   Injectable 25 milliGRAM(s) IV Push every 4 hours PRN  metoclopramide Injectable 10 milliGRAM(s) IV Push every 6 hours PRN  sodium chloride 0.9% lock flush 10 milliLiter(s) IV Push every 1 hour PRN      Mr. Delong is a 59 year old male, with previously diagnosed acute myeloid leukemia. s/p induction chemotherapy with Daunorubicin/Cytarabine and  HIDAC consolidation chemotherapy, now admitted for Haplo-identical stem cell transplant Day -3 today   Conditioning regimen of Fludarabine, Cytoxan, and TBI.   Other history includes non-ischemic cardiomyopathy (recent EF 25% 19), COLLEEN, DVT/PE, HTN, Factor V Leiden and amputation of right toe due to osteomyelitis.   renal insufficiency due to diuresis, monitor closely  Xarelto for h/o DVT/PE   held due to pending PLT <50 K and continue low dose Heparin gtt     1. Infectious Disease:   clotrimazole Lozenge 1 Lozenge Oral five times a day  trimethoprim  160 mG/sulfamethoxazole 800 mG 1 Tablet(s) Oral every 12 hours  voriconazole 200 milliGRAM(s) Oral every 12 hours    2. VOD Prophylaxis: Actigall, Glutamine, Heparin (dosed at 100 units / kg / day)     3. GI Prophylaxis:  Protonix    4. Mouthcare - NS / NaHCO3 rinses, Mycelex, Biotene; Skin care     5. GVHD prophylaxis     6. Transfuse & replete electrolytes prn      7. IV hydration, daily weights, strict I&O, prn diuresis   monitor renal insufficiency( increased Cr y=to 1.49 and phos 5.3) closely     8. PO intake as tolerated, nutrition follow up as needed, MVI, folic acid     9. Antiemetics, anti-diarrhea medications:      10. OOB as tolerated, physical therapy consult if needed     11. Monitor coags / fibrinogen 2x week, vitamin K as needed     12. Monitor closely for clinical changes, monitor for fevers     13. Emotional support provided, plan of care discussed and questions addressed     14. Patient education done regarding chemotherapy prep, plan of care, restrictions and discharge planning     15. Continue regular social work input     I have written the above note for Dr. Quiñones  who performed service with me in the room.   Brittney Mcmahon NP ([phone])    I have seen and examined patient with NP, I agree with above note as scribed.

## 2019-10-06 NOTE — ADVANCED PRACTICE NURSE CONSULT - ASSESSMENT
Pt alert & oriented x4, resting comfortable in bed. Right IJ TL cath, dressing dry & intact.  All lines with positive blood return &  flushes easily. Reviewed with patient his chemo regimen and well understood. Fludarabine 30mg/m2/=59mg. Chemo verified by 2 RNs for safety.

## 2019-10-07 LAB
ALBUMIN SERPL ELPH-MCNC: 4.1 G/DL — SIGNIFICANT CHANGE UP (ref 3.3–5)
ALP SERPL-CCNC: 72 U/L — SIGNIFICANT CHANGE UP (ref 40–120)
ALT FLD-CCNC: 21 U/L — SIGNIFICANT CHANGE UP (ref 10–45)
ANION GAP SERPL CALC-SCNC: 8 MMOL/L — SIGNIFICANT CHANGE UP (ref 5–17)
ANISOCYTOSIS BLD QL: SLIGHT — SIGNIFICANT CHANGE UP
APTT BLD: 30.8 SEC — SIGNIFICANT CHANGE UP (ref 27.5–36.3)
AST SERPL-CCNC: 28 U/L — SIGNIFICANT CHANGE UP (ref 10–40)
BASOPHILS # BLD AUTO: 0 K/UL — SIGNIFICANT CHANGE UP (ref 0–0.2)
BASOPHILS NFR BLD AUTO: 0 % — SIGNIFICANT CHANGE UP (ref 0–2)
BILIRUB DIRECT SERPL-MCNC: 0.1 MG/DL — SIGNIFICANT CHANGE UP (ref 0–0.2)
BILIRUB INDIRECT FLD-MCNC: 0.4 MG/DL — SIGNIFICANT CHANGE UP (ref 0.2–1)
BILIRUB SERPL-MCNC: 0.5 MG/DL — SIGNIFICANT CHANGE UP (ref 0.2–1.2)
BLD GP AB SCN SERPL QL: NEGATIVE — SIGNIFICANT CHANGE UP
BUN SERPL-MCNC: 25 MG/DL — HIGH (ref 7–23)
CALCIUM SERPL-MCNC: 9.6 MG/DL — SIGNIFICANT CHANGE UP (ref 8.4–10.5)
CHLORIDE SERPL-SCNC: 99 MMOL/L — SIGNIFICANT CHANGE UP (ref 96–108)
CHROM ANALY OVERALL INTERP SPEC-IMP: SIGNIFICANT CHANGE UP
CO2 SERPL-SCNC: 25 MMOL/L — SIGNIFICANT CHANGE UP (ref 22–31)
CREAT SERPL-MCNC: 1.4 MG/DL — HIGH (ref 0.5–1.3)
EOSINOPHIL # BLD AUTO: 0.02 K/UL — SIGNIFICANT CHANGE UP (ref 0–0.5)
EOSINOPHIL NFR BLD AUTO: 0.8 % — SIGNIFICANT CHANGE UP (ref 0–6)
FIBRINOGEN PPP-MCNC: 498 MG/DL — SIGNIFICANT CHANGE UP (ref 350–510)
GLUCOSE SERPL-MCNC: 99 MG/DL — SIGNIFICANT CHANGE UP (ref 70–99)
HCT VFR BLD CALC: 26.2 % — LOW (ref 39–50)
HGB BLD-MCNC: 8.6 G/DL — LOW (ref 13–17)
INR BLD: 0.95 RATIO — SIGNIFICANT CHANGE UP (ref 0.88–1.16)
LDH SERPL L TO P-CCNC: 159 U/L — SIGNIFICANT CHANGE UP (ref 50–242)
LYMPHOCYTES # BLD AUTO: 0.02 K/UL — LOW (ref 1–3.3)
LYMPHOCYTES # BLD AUTO: 0.9 % — LOW (ref 13–44)
MAGNESIUM SERPL-MCNC: 2 MG/DL — SIGNIFICANT CHANGE UP (ref 1.6–2.6)
MANUAL SMEAR VERIFICATION: SIGNIFICANT CHANGE UP
MCHC RBC-ENTMCNC: 31.2 PG — SIGNIFICANT CHANGE UP (ref 27–34)
MCHC RBC-ENTMCNC: 32.8 GM/DL — SIGNIFICANT CHANGE UP (ref 32–36)
MCV RBC AUTO: 94.9 FL — SIGNIFICANT CHANGE UP (ref 80–100)
MONOCYTES # BLD AUTO: 0.02 K/UL — SIGNIFICANT CHANGE UP (ref 0–0.9)
MONOCYTES NFR BLD AUTO: 0.9 % — LOW (ref 2–14)
NEUTROPHILS # BLD AUTO: 2.58 K/UL — SIGNIFICANT CHANGE UP (ref 1.8–7.4)
NEUTROPHILS NFR BLD AUTO: 97.4 % — HIGH (ref 43–77)
PHOSPHATE SERPL-MCNC: 4.4 MG/DL — SIGNIFICANT CHANGE UP (ref 2.5–4.5)
PLAT MORPH BLD: ABNORMAL
PLATELET # BLD AUTO: 57 K/UL — LOW (ref 150–400)
POTASSIUM SERPL-MCNC: 4.4 MMOL/L — SIGNIFICANT CHANGE UP (ref 3.5–5.3)
POTASSIUM SERPL-SCNC: 4.4 MMOL/L — SIGNIFICANT CHANGE UP (ref 3.5–5.3)
PROT SERPL-MCNC: 6.1 G/DL — SIGNIFICANT CHANGE UP (ref 6–8.3)
PROTHROM AB SERPL-ACNC: 10.9 SEC — SIGNIFICANT CHANGE UP (ref 10–12.9)
RBC # BLD: 2.76 M/UL — LOW (ref 4.2–5.8)
RBC # FLD: 16.8 % — HIGH (ref 10.3–14.5)
RBC BLD AUTO: SIGNIFICANT CHANGE UP
RH IG SCN BLD-IMP: POSITIVE — SIGNIFICANT CHANGE UP
SODIUM SERPL-SCNC: 132 MMOL/L — LOW (ref 135–145)
WBC # BLD: 2.65 K/UL — LOW (ref 3.8–10.5)
WBC # FLD AUTO: 2.65 K/UL — LOW (ref 3.8–10.5)

## 2019-10-07 PROCEDURE — 99291 CRITICAL CARE FIRST HOUR: CPT

## 2019-10-07 PROCEDURE — 99232 SBSQ HOSP IP/OBS MODERATE 35: CPT

## 2019-10-07 RX ORDER — ALPRAZOLAM 0.25 MG
0.5 TABLET ORAL AT BEDTIME
Refills: 0 | Status: DISCONTINUED | OUTPATIENT
Start: 2019-10-07 | End: 2019-10-07

## 2019-10-07 RX ORDER — ALPRAZOLAM 0.25 MG
0.5 TABLET ORAL AT BEDTIME
Refills: 0 | Status: DISCONTINUED | OUTPATIENT
Start: 2019-10-07 | End: 2019-10-12

## 2019-10-07 RX ADMIN — URSODIOL 300 MILLIGRAM(S): 250 TABLET, FILM COATED ORAL at 17:14

## 2019-10-07 RX ADMIN — Medication 400 MILLIGRAM(S): at 06:31

## 2019-10-07 RX ADMIN — VORICONAZOLE 200 MILLIGRAM(S): 10 INJECTION, POWDER, LYOPHILIZED, FOR SOLUTION INTRAVENOUS at 06:29

## 2019-10-07 RX ADMIN — PANTOPRAZOLE SODIUM 40 MILLIGRAM(S): 20 TABLET, DELAYED RELEASE ORAL at 06:29

## 2019-10-07 RX ADMIN — FINASTERIDE 5 MILLIGRAM(S): 5 TABLET, FILM COATED ORAL at 11:23

## 2019-10-07 RX ADMIN — Medication 1 MILLIGRAM(S): at 11:22

## 2019-10-07 RX ADMIN — SENNA PLUS 2 TABLET(S): 8.6 TABLET ORAL at 21:51

## 2019-10-07 RX ADMIN — Medication 1 LOZENGE: at 11:22

## 2019-10-07 RX ADMIN — Medication 1 TABLET(S): at 11:22

## 2019-10-07 RX ADMIN — Medication 1 LOZENGE: at 00:46

## 2019-10-07 RX ADMIN — SODIUM CHLORIDE 20 MILLILITER(S): 9 INJECTION INTRAMUSCULAR; INTRAVENOUS; SUBCUTANEOUS at 15:59

## 2019-10-07 RX ADMIN — Medication 650 MILLIGRAM(S): at 09:30

## 2019-10-07 RX ADMIN — Medication 10 MILLILITER(S): at 20:05

## 2019-10-07 RX ADMIN — HEPARIN SODIUM 3.39 UNIT(S)/HR: 5000 INJECTION INTRAVENOUS; SUBCUTANEOUS at 16:00

## 2019-10-07 RX ADMIN — Medication 12.5 MILLIGRAM(S): at 06:29

## 2019-10-07 RX ADMIN — VORICONAZOLE 200 MILLIGRAM(S): 10 INJECTION, POWDER, LYOPHILIZED, FOR SOLUTION INTRAVENOUS at 17:14

## 2019-10-07 RX ADMIN — Medication 1 LOZENGE: at 08:06

## 2019-10-07 RX ADMIN — Medication 10 MILLILITER(S): at 08:06

## 2019-10-07 RX ADMIN — Medication 1 LOZENGE: at 20:05

## 2019-10-07 RX ADMIN — FLUDARABINE PHOSPHATE 204.72 MILLIGRAM(S): 25 INJECTION, SOLUTION INTRAVENOUS at 19:52

## 2019-10-07 RX ADMIN — ONDANSETRON 8 MILLIGRAM(S): 8 TABLET, FILM COATED ORAL at 09:29

## 2019-10-07 RX ADMIN — CHLORHEXIDINE GLUCONATE 1 APPLICATION(S): 213 SOLUTION TOPICAL at 08:08

## 2019-10-07 RX ADMIN — Medication 25 MILLIGRAM(S): at 09:29

## 2019-10-07 RX ADMIN — Medication 2.5 MILLIGRAM(S): at 06:29

## 2019-10-07 RX ADMIN — Medication 1 TABLET(S): at 06:30

## 2019-10-07 RX ADMIN — Medication 650 MILLIGRAM(S): at 13:15

## 2019-10-07 RX ADMIN — Medication 5 MILLILITER(S): at 11:22

## 2019-10-07 RX ADMIN — Medication 0.5 MILLIGRAM(S): at 21:50

## 2019-10-07 RX ADMIN — Medication 5 MILLILITER(S): at 08:06

## 2019-10-07 RX ADMIN — ONDANSETRON 8 MILLIGRAM(S): 8 TABLET, FILM COATED ORAL at 01:37

## 2019-10-07 RX ADMIN — Medication 10 MILLILITER(S): at 11:22

## 2019-10-07 RX ADMIN — Medication 5 MILLILITER(S): at 20:04

## 2019-10-07 RX ADMIN — TAMSULOSIN HYDROCHLORIDE 0.4 MILLIGRAM(S): 0.4 CAPSULE ORAL at 21:51

## 2019-10-07 RX ADMIN — Medication 5 MILLILITER(S): at 16:00

## 2019-10-07 RX ADMIN — Medication 400 MILLIGRAM(S): at 13:13

## 2019-10-07 RX ADMIN — Medication 1 LOZENGE: at 16:00

## 2019-10-07 RX ADMIN — APREPITANT 80 MILLIGRAM(S): 80 CAPSULE ORAL at 06:33

## 2019-10-07 RX ADMIN — Medication 100 MILLIGRAM(S): at 21:51

## 2019-10-07 RX ADMIN — Medication 10 MILLILITER(S): at 16:00

## 2019-10-07 RX ADMIN — IMMUNE GLOBULIN (HUMAN) 50 GRAM(S): 10 INJECTION INTRAVENOUS; SUBCUTANEOUS at 10:15

## 2019-10-07 RX ADMIN — ONDANSETRON 8 MILLIGRAM(S): 8 TABLET, FILM COATED ORAL at 17:13

## 2019-10-07 RX ADMIN — Medication 1 TABLET(S): at 17:14

## 2019-10-07 RX ADMIN — SERTRALINE 50 MILLIGRAM(S): 25 TABLET, FILM COATED ORAL at 21:51

## 2019-10-07 RX ADMIN — Medication 10 MILLILITER(S): at 00:46

## 2019-10-07 RX ADMIN — Medication 650 MILLIGRAM(S): at 12:44

## 2019-10-07 RX ADMIN — Medication 100 MILLIGRAM(S): at 13:13

## 2019-10-07 RX ADMIN — Medication 400 MILLIGRAM(S): at 21:51

## 2019-10-07 RX ADMIN — Medication 25 MILLIGRAM(S): at 09:30

## 2019-10-07 RX ADMIN — Medication 12.5 MILLIGRAM(S): at 20:03

## 2019-10-07 RX ADMIN — Medication 5 MILLILITER(S): at 00:46

## 2019-10-07 RX ADMIN — URSODIOL 300 MILLIGRAM(S): 250 TABLET, FILM COATED ORAL at 08:06

## 2019-10-07 NOTE — PROGRESS NOTE ADULT - SUBJECTIVE AND OBJECTIVE BOX
[facility]PC Transplant Team                                                      Critical / Counseling Time Provided: 30 minutes                                                                                                                                                        Chief Complaint: Allogeneic stem cell transplant    S: Patient seen and examined with HPC Transplant Team:       Denies mouth / tongue / throat pain, dyspnea, cough, nausea, vomiting, diarrhea, abdominal pain     O: Vitals:   Vital Signs Last 24 Hrs  T(C): 36.2 (07 Oct 2019 05:56), Max: 36.8 (06 Oct 2019 09:26)  T(F): 97.1 (07 Oct 2019 05:56), Max: 98.2 (06 Oct 2019 09:26)  HR: 88 (07 Oct 2019 05:56) (75 - 103)  BP: 102/64 (07 Oct 2019 05:56) (100/75 - 125/78)  BP(mean): --  RR: 18 (07 Oct 2019 05:56) (18 - 19)  SpO2: 97% (07 Oct 2019 05:56) (97% - 99%)    Admit weight: 81.5 Kg  Daily Weight in k.4 (06 Oct 2019 09:26)    Intake / Output:   10-06 @ 07:01  -  10-07 @ 07:00  --------------------------------------------------------  IN: 1328.7 mL / OUT: 1800 mL / NET: -471.3 mL    10-07 @ 07:01  -  10-07 @ 08:28  --------------------------------------------------------  IN: 32 mL / OUT: 0 mL / NET: 32 mL        PE:   Oropharynx: no ulceration or erythema  Oral Mucositis:      none                                                  Grade:   CVS: reg S1 S2  Lungs: CTA  Abdomen: soft, + normoactive BS, NT, ND  Extremities: No C/C/E  Gastric Mucositis:     none                                             Grade:   Intestinal Mucositis:      none                                        Grade:   Skin: no rash  TLC:  C/D/I  Neuro: Alert and oriented x3, no focal deficit   Pain: none        Labs:   CBC Full  -  ( 07 Oct 2019 07:07 )  WBC Count : 2.65 K/uL  Hemoglobin : 8.6 g/dL  Hematocrit : 26.2 %  Platelet Count - Automated : 57 K/uL  Mean Cell Volume : 94.9 fl  Mean Cell Hemoglobin : 31.2 pg  Mean Cell Hemoglobin Concentration : 32.8 gm/dL  Auto Neutrophil # : 2.58 K/uL  Auto Lymphocyte # : 0.02 K/uL  Auto Monocyte # : 0.02 K/uL  Auto Eosinophil # : 0.02 K/uL  Auto Basophil # : 0.00 K/uL  Auto Neutrophil % : 97.4 %  Auto Lymphocyte % : 0.9 %  Auto Monocyte % : 0.9 %  Auto Eosinophil % : 0.8 %  Auto Basophil % : 0.0 %                          8.6    2.65  )-----------( 57       ( 07 Oct 2019 07:07 )             26.2     10-    132<L>  |  99  |  25<H>  ----------------------------<  99  4.4   |  25  |  1.40<H>    Ca    9.6      07 Oct 2019 07:03  Phos  4.4     10-07  Mg     2.0     10-07    TPro  6.1  /  Alb  4.1  /  TBili  0.5  /  DBili  0.1  /  AST  28  /  ALT  21  /  AlkPhos  72  10    PT/INR - ( 07 Oct 2019 07:16 )   PT: 10.9 sec;   INR: 0.95 ratio         PTT - ( 07 Oct 2019 07:16 )  PTT:30.8 sec  LIVER FUNCTIONS - ( 07 Oct 2019 07:03 )  Alb: 4.1 g/dL / Pro: 6.1 g/dL / ALK PHOS: 72 U/L / ALT: 21 U/L / AST: 28 U/L / GGT: x           Lactate Dehydrogenase, Serum: 159 U/L (10-07 @ 07:03)          Meds:   Antimicrobials:   acyclovir   Oral Tab/Cap 400 milliGRAM(s) Oral every 8 hours  clotrimazole Lozenge 1 Lozenge Oral five times a day  trimethoprim  160 mG/sulfamethoxazole 800 mG 1 Tablet(s) Oral every 12 hours  voriconazole 200 milliGRAM(s) Oral every 12 hours      Heme / Onc:   fludarabine IVPB (eMAR) 59 milliGRAM(s) IV Intermittent every 24 hours  heparin  Infusion 339 Unit(s)/Hr IV Continuous <Continuous>      GI:  docusate sodium 100 milliGRAM(s) Oral three times a day  pantoprazole    Tablet 40 milliGRAM(s) Oral before breakfast  senna 2 Tablet(s) Oral at bedtime  sodium bicarbonate Mouth Rinse 10 milliLiter(s) Swish and Spit five times a day  ursodiol Capsule 300 milliGRAM(s) Oral two times a day with meals      Cardiovascular:   enalapril 2.5 milliGRAM(s) Oral daily  metoprolol succinate ER 12.5 milliGRAM(s) Oral two times a day  tamsulosin 0.4 milliGRAM(s) Oral at bedtime      Immunologic:   immune   globulin 10% (GAMMAGARD) IVPB 30 Gram(s) IV Intermittent <User Schedule>      Other medications:   acetaminophen   Tablet .. 650 milliGRAM(s) Oral every 6 hours  acetaminophen   Tablet .. 650 milliGRAM(s) Oral <User Schedule>  ALPRAZolam 0.5 milliGRAM(s) Oral at bedtime  aprepitant 80 milliGRAM(s) Oral every 24 hours  Biotene Dry Mouth Oral Rinse 5 milliLiter(s) Swish and Spit five times a day  chlorhexidine 4% Liquid 1 Application(s) Topical <User Schedule>  diphenhydrAMINE   Injectable 25 milliGRAM(s) IV Push <User Schedule>  finasteride 5 milliGRAM(s) Oral daily  folic acid 1 milliGRAM(s) Oral daily  multivitamin 1 Tablet(s) Oral daily  ondansetron Injectable 8 milliGRAM(s) IV Push every 8 hours  sertraline 50 milliGRAM(s) Oral at bedtime  sodium chloride 0.45%. 1000 milliLiter(s) IV Continuous <Continuous>  sodium chloride 0.9%. 1000 milliLiter(s) IV Continuous <Continuous>      PRN:   acetaminophen   Tablet .. 650 milliGRAM(s) Oral every 6 hours PRN  diphenhydrAMINE   Injectable 25 milliGRAM(s) IV Push every 4 hours PRN  metoclopramide Injectable 10 milliGRAM(s) IV Push every 6 hours PRN  sodium chloride 0.9% lock flush 10 milliLiter(s) IV Push every 1 hour PRN      Mr. Delong is a 59 year old male, with previously diagnosed acute myeloid leukemia. s/p induction chemotherapy with Daunorubicin/Cytarabine and  HIDAC consolidation chemotherapy, now admitted for Haplo-identical stem cell transplant Day -2 today   Conditioning regimen of Fludarabine, Cytoxan, and TBI.   Other history includes non-ischemic cardiomyopathy (recent EF 25% 19), COLLEEN, DVT/PE, HTN, Factor V Leiden and amputation of right toe due to osteomyelitis.   renal insufficiency due to diuresis, monitor closely  Xarelto for h/o DVT/PE   held due to pending PLT <50 K and continue low dose Heparin gtt     1. Infectious Disease:   clotrimazole Lozenge 1 Lozenge Oral five times a day  trimethoprim  160 mG/sulfamethoxazole 800 mG 1 Tablet(s) Oral every 12 hours  voriconazole 200 milliGRAM(s) Oral every 12 hours  acyclovir   Oral Tab/Cap 400 milliGRAM(s) Oral every 8 hours  2. VOD Prophylaxis: Actigall, Glutamine, Heparin (dosed at 100 units / kg / day)     3. GI Prophylaxis:  Protonix    4. Mouthcare - NS / NaHCO3 rinses, Mycelex, Biotene; Skin care     5. GVHD prophylaxis     6. Transfuse & replete electrolytes prn      7. IV hydration, daily weights, strict I&O, prn diuresis       8. PO intake as tolerated, nutrition follow up as needed, MVI, folic acid     9. Antiemetics, anti-diarrhea medications:      10. OOB as tolerated, physical therapy consult if needed     11. Monitor coags / fibrinogen 2x week, vitamin K as needed     12. Monitor closely for clinical changes, monitor for fevers     13. Emotional support provided, plan of care discussed and questions addressed     14. Patient education done regarding chemotherapy prep, plan of care, restrictions and discharge planning     15. Continue regular social work input     I have written the above note for Dr. Goldberg  who performed service with me in the room.   Suki Sage  NP-C ([phone])    I have seen and examined patient with NP, I agree with above note as scribed. [facility] Transplant Team                                                      Critical / Counseling Time Provided: 30 minutes                                                                                                                                                        Chief Complaint: Allogeneic stem cell transplant    S: Patient seen and examined with HPC Transplant Team:       Denies mouth / tongue / throat pain, dyspnea, cough, nausea, vomiting, diarrhea, abdominal pain     O: Vitals:   Vital Signs Last 24 Hrs  T(C): 36.2 (07 Oct 2019 05:56), Max: 36.8 (06 Oct 2019 09:26)  T(F): 97.1 (07 Oct 2019 05:56), Max: 98.2 (06 Oct 2019 09:26)  HR: 88 (07 Oct 2019 05:56) (75 - 103)  BP: 102/64 (07 Oct 2019 05:56) (100/75 - 125/78)  BP(mean): --  RR: 18 (07 Oct 2019 05:56) (18 - 19)  SpO2: 97% (07 Oct 2019 05:56) (97% - 99%)    Admit weight: 81.5 Kg  Daily Weight in k.4 (06 Oct 2019 09:26)    Intake / Output:   10-06 @ 07:01  -  10-07 @ 07:00  --------------------------------------------------------  IN: 1328.7 mL / OUT: 1800 mL / NET: -471.3 mL    10-07 @ 07:01  -  10-07 @ 08:28  --------------------------------------------------------  IN: 32 mL / OUT: 0 mL / NET: 32 mL        PE:   Oropharynx: no ulceration or erythema  Oral Mucositis:      none                                                  Grade:   CVS: reg S1 S2  Lungs: CTA  Abdomen: soft, + normoactive BS, NT, ND  Extremities: No C/C/E  Gastric Mucositis:     none                                             Grade:   Intestinal Mucositis:      none                                        Grade:   Skin: no rash  TLC:  C/D/I  Neuro: Alert and oriented x3, no focal deficit   Pain: none        Labs:   CBC Full  -  ( 07 Oct 2019 07:07 )  WBC Count : 2.65 K/uL  Hemoglobin : 8.6 g/dL  Hematocrit : 26.2 %  Platelet Count - Automated : 57 K/uL  Mean Cell Volume : 94.9 fl  Mean Cell Hemoglobin : 31.2 pg  Mean Cell Hemoglobin Concentration : 32.8 gm/dL  Auto Neutrophil # : 2.58 K/uL  Auto Lymphocyte # : 0.02 K/uL  Auto Monocyte # : 0.02 K/uL  Auto Eosinophil # : 0.02 K/uL  Auto Basophil # : 0.00 K/uL  Auto Neutrophil % : 97.4 %  Auto Lymphocyte % : 0.9 %  Auto Monocyte % : 0.9 %  Auto Eosinophil % : 0.8 %  Auto Basophil % : 0.0 %                          8.6    2.65  )-----------( 57       ( 07 Oct 2019 07:07 )             26.2     10-    132<L>  |  99  |  25<H>  ----------------------------<  99  4.4   |  25  |  1.40<H>    Ca    9.6      07 Oct 2019 07:03  Phos  4.4     10-07  Mg     2.0     10-07    TPro  6.1  /  Alb  4.1  /  TBili  0.5  /  DBili  0.1  /  AST  28  /  ALT  21  /  AlkPhos  72  10    PT/INR - ( 07 Oct 2019 07:16 )   PT: 10.9 sec;   INR: 0.95 ratio         PTT - ( 07 Oct 2019 07:16 )  PTT:30.8 sec  LIVER FUNCTIONS - ( 07 Oct 2019 07:03 )  Alb: 4.1 g/dL / Pro: 6.1 g/dL / ALK PHOS: 72 U/L / ALT: 21 U/L / AST: 28 U/L / GGT: x           Lactate Dehydrogenase, Serum: 159 U/L (10-07 @ 07:03)          Meds:   Antimicrobials:   acyclovir   Oral Tab/Cap 400 milliGRAM(s) Oral every 8 hours  clotrimazole Lozenge 1 Lozenge Oral five times a day  trimethoprim  160 mG/sulfamethoxazole 800 mG 1 Tablet(s) Oral every 12 hours  voriconazole 200 milliGRAM(s) Oral every 12 hours      Heme / Onc:   fludarabine IVPB (eMAR) 59 milliGRAM(s) IV Intermittent every 24 hours  heparin  Infusion 339 Unit(s)/Hr IV Continuous <Continuous>      GI:  docusate sodium 100 milliGRAM(s) Oral three times a day  pantoprazole    Tablet 40 milliGRAM(s) Oral before breakfast  senna 2 Tablet(s) Oral at bedtime  sodium bicarbonate Mouth Rinse 10 milliLiter(s) Swish and Spit five times a day  ursodiol Capsule 300 milliGRAM(s) Oral two times a day with meals      Cardiovascular:   enalapril 2.5 milliGRAM(s) Oral daily  metoprolol succinate ER 12.5 milliGRAM(s) Oral two times a day  tamsulosin 0.4 milliGRAM(s) Oral at bedtime      Immunologic:   immune   globulin 10% (GAMMAGARD) IVPB 30 Gram(s) IV Intermittent <User Schedule>      Other medications:   acetaminophen   Tablet .. 650 milliGRAM(s) Oral every 6 hours  acetaminophen   Tablet .. 650 milliGRAM(s) Oral <User Schedule>  ALPRAZolam 0.5 milliGRAM(s) Oral at bedtime  aprepitant 80 milliGRAM(s) Oral every 24 hours  Biotene Dry Mouth Oral Rinse 5 milliLiter(s) Swish and Spit five times a day  chlorhexidine 4% Liquid 1 Application(s) Topical <User Schedule>  diphenhydrAMINE   Injectable 25 milliGRAM(s) IV Push <User Schedule>  finasteride 5 milliGRAM(s) Oral daily  folic acid 1 milliGRAM(s) Oral daily  multivitamin 1 Tablet(s) Oral daily  ondansetron Injectable 8 milliGRAM(s) IV Push every 8 hours  sertraline 50 milliGRAM(s) Oral at bedtime  sodium chloride 0.45%. 1000 milliLiter(s) IV Continuous <Continuous>  sodium chloride 0.9%. 1000 milliLiter(s) IV Continuous <Continuous>      PRN:   acetaminophen   Tablet .. 650 milliGRAM(s) Oral every 6 hours PRN  diphenhydrAMINE   Injectable 25 milliGRAM(s) IV Push every 4 hours PRN  metoclopramide Injectable 10 milliGRAM(s) IV Push every 6 hours PRN  sodium chloride 0.9% lock flush 10 milliLiter(s) IV Push every 1 hour PRN      Mr. Delong is a 59 year old male, with previously diagnosed acute myeloid leukemia. s/p induction chemotherapy with Daunorubicin/Cytarabine and  HIDAC consolidation chemotherapy, now admitted for Haplo-identical stem cell transplant Day -2 today   Conditioning regimen of Fludarabine, Cytoxan, and TBI.   Other history includes non-ischemic cardiomyopathy (recent EF 25% 19), COLLEEN, DVT/PE, HTN, Factor V Leiden and amputation of right toe due to osteomyelitis.   renal insufficiency due to diuresis, monitor closely  Xarelto for h/o DVT/PE   held due to pending PLT <50 K and continue low dose Heparin gtt     1. Infectious Disease:   clotrimazole Lozenge 1 Lozenge Oral five times a day  trimethoprim  160 mG/sulfamethoxazole 800 mG 1 Tablet(s) Oral every 12 hours  voriconazole 200 milliGRAM(s) Oral every 12 hours  acyclovir   Oral Tab/Cap 400 milliGRAM(s) Oral every 8 hours  2. VOD Prophylaxis: Actigall, Glutamine, Heparin (dosed at 100 units / kg / day)     3. GI Prophylaxis:  Protonix    4. Mouthcare - NS / NaHCO3 rinses, Mycelex, Biotene; Skin care     5. GVHD prophylaxis     6. Transfuse & replete electrolytes prn      7. IV hydration, daily weights, strict I&O, prn diuresis       8. PO intake as tolerated, nutrition follow up as needed, MVI, folic acid     9. Antiemetics, anti-diarrhea medications:      10. OOB as tolerated, physical therapy consult if needed     11. Monitor coags / fibrinogen 2x week, vitamin K as needed     12. Monitor closely for clinical changes, monitor for fevers     13. Emotional support provided, plan of care discussed and questions addressed     14. Patient education done regarding chemotherapy prep, plan of care, restrictions and discharge planning     15. Continue regular social work input     I have written the above note for Dr. Gaytan  who performed service with me in the room.   Suki Sage  NP-C ([phone])    I have seen and examined patient with NP, I agree with above note as scribed. [facility] Transplant Team                                                      Critical / Counseling Time Provided: 30 minutes                                                                                                                                                        Chief Complaint: Allogeneic stem cell transplant    S: Patient seen and examined with HPC Transplant Team:   No acute complaints today      Denies mouth / tongue / throat pain, dyspnea, cough, nausea, vomiting, diarrhea, abdominal pain     O: Vitals:   Vital Signs Last 24 Hrs  T(C): 36.2 (07 Oct 2019 05:56), Max: 36.8 (06 Oct 2019 09:26)  T(F): 97.1 (07 Oct 2019 05:56), Max: 98.2 (06 Oct 2019 09:26)  HR: 88 (07 Oct 2019 05:56) (75 - 103)  BP: 102/64 (07 Oct 2019 05:56) (100/75 - 125/78)  BP(mean): --  RR: 18 (07 Oct 2019 05:56) (18 - 19)  SpO2: 97% (07 Oct 2019 05:56) (97% - 99%)    Admit weight: 81.5 Kg  Daily Weight in k.9 Kg (07 Oct 2019 09:26)    Intake / Output:   10-06 @ :01  -  10-07 @ 07:00  --------------------------------------------------------  IN: 1328.7 mL / OUT: 1800 mL / NET: -471.3 mL    10-07 @ :01  -  10-07 @ 08:28  --------------------------------------------------------  IN: 32 mL / OUT: 0 mL / NET: 32 mL        PE:   Oropharynx: no ulceration or erythema  Oral Mucositis:      none                                                  Grade:   CVS: reg S1 S2  Lungs: CTA  Abdomen: soft, + normoactive BS, NT, ND  Extremities: No C/C/E  Gastric Mucositis:     none                                             Grade:   Intestinal Mucositis:      none                                        Grade:   Skin: no rash  TLC:  C/D/I  Neuro: Alert and oriented x3  Pain: none        Labs:   CBC Full  -  ( 07 Oct 2019 07:07 )  WBC Count : 2.65 K/uL  Hemoglobin : 8.6 g/dL  Hematocrit : 26.2 %  Platelet Count - Automated : 57 K/uL  Mean Cell Volume : 94.9 fl  Mean Cell Hemoglobin : 31.2 pg  Mean Cell Hemoglobin Concentration : 32.8 gm/dL  Auto Neutrophil # : 2.58 K/uL  Auto Lymphocyte # : 0.02 K/uL  Auto Monocyte # : 0.02 K/uL  Auto Eosinophil # : 0.02 K/uL  Auto Basophil # : 0.00 K/uL  Auto Neutrophil % : 97.4 %  Auto Lymphocyte % : 0.9 %  Auto Monocyte % : 0.9 %  Auto Eosinophil % : 0.8 %  Auto Basophil % : 0.0 %                          8.6    2.65  )-----------( 57       ( 07 Oct 2019 07:07 )             26.2     10-    132<L>  |  99  |  25<H>  ----------------------------<  99  4.4   |  25  |  1.40<H>    Ca    9.6      07 Oct 2019 07:03  Phos  4.4     10-  Mg     2.0     10-07    TPro  6.1  /  Alb  4.1  /  TBili  0.5  /  DBili  0.1  /  AST  28  /  ALT  21  /  AlkPhos  72  10-    PT/INR - ( 07 Oct 2019 07:16 )   PT: 10.9 sec;   INR: 0.95 ratio         PTT - ( 07 Oct 2019 07:16 )  PTT:30.8 sec  LIVER FUNCTIONS - ( 07 Oct 2019 07:03 )  Alb: 4.1 g/dL / Pro: 6.1 g/dL / ALK PHOS: 72 U/L / ALT: 21 U/L / AST: 28 U/L / GGT: x           Lactate Dehydrogenase, Serum: 159 U/L (10-07 @ 07:03)          Meds:   Antimicrobials:   acyclovir   Oral Tab/Cap 400 milliGRAM(s) Oral every 8 hours  clotrimazole Lozenge 1 Lozenge Oral five times a day  trimethoprim  160 mG/sulfamethoxazole 800 mG 1 Tablet(s) Oral every 12 hours  voriconazole 200 milliGRAM(s) Oral every 12 hours      Heme / Onc:   fludarabine IVPB (eMAR) 59 milliGRAM(s) IV Intermittent every 24 hours  heparin  Infusion 339 Unit(s)/Hr IV Continuous <Continuous>      GI:  docusate sodium 100 milliGRAM(s) Oral three times a day  pantoprazole    Tablet 40 milliGRAM(s) Oral before breakfast  senna 2 Tablet(s) Oral at bedtime  sodium bicarbonate Mouth Rinse 10 milliLiter(s) Swish and Spit five times a day  ursodiol Capsule 300 milliGRAM(s) Oral two times a day with meals      Cardiovascular:   enalapril 2.5 milliGRAM(s) Oral daily  metoprolol succinate ER 12.5 milliGRAM(s) Oral two times a day  tamsulosin 0.4 milliGRAM(s) Oral at bedtime      Immunologic:   immune   globulin 10% (GAMMAGARD) IVPB 30 Gram(s) IV Intermittent <User Schedule>      Other medications:   acetaminophen   Tablet .. 650 milliGRAM(s) Oral every 6 hours  acetaminophen   Tablet .. 650 milliGRAM(s) Oral <User Schedule>  ALPRAZolam 0.5 milliGRAM(s) Oral at bedtime  aprepitant 80 milliGRAM(s) Oral every 24 hours  Biotene Dry Mouth Oral Rinse 5 milliLiter(s) Swish and Spit five times a day  chlorhexidine 4% Liquid 1 Application(s) Topical <User Schedule>  diphenhydrAMINE   Injectable 25 milliGRAM(s) IV Push <User Schedule>  finasteride 5 milliGRAM(s) Oral daily  folic acid 1 milliGRAM(s) Oral daily  multivitamin 1 Tablet(s) Oral daily  ondansetron Injectable 8 milliGRAM(s) IV Push every 8 hours  sertraline 50 milliGRAM(s) Oral at bedtime  sodium chloride 0.45%. 1000 milliLiter(s) IV Continuous <Continuous>  sodium chloride 0.9%. 1000 milliLiter(s) IV Continuous <Continuous>      PRN:   acetaminophen   Tablet .. 650 milliGRAM(s) Oral every 6 hours PRN  diphenhydrAMINE   Injectable 25 milliGRAM(s) IV Push every 4 hours PRN  metoclopramide Injectable 10 milliGRAM(s) IV Push every 6 hours PRN  sodium chloride 0.9% lock flush 10 milliLiter(s) IV Push every 1 hour PRN      Mr. Delong is a 59 year old male, with previously diagnosed acute myeloid leukemia. s/p induction chemotherapy with Daunorubicin/Cytarabine and  HIDAC consolidation chemotherapy, now admitted for Haplo-identical stem cell transplant Day -2 today   Conditioning regimen of Fludarabine, Cytoxan, and TBI.   Other history includes non-ischemic cardiomyopathy (recent EF 25% 19), COLLEEN, DVT/PE, HTN, Factor V Leiden and amputation of right toe due to osteomyelitis.   renal insufficiency due to diuresis, monitor closely  Xarelto for h/o DVT/PE   held due to pending PLT <50 K and continue low dose Heparin gtt     1. Infectious Disease:   clotrimazole Lozenge 1 Lozenge Oral five times a day  trimethoprim  160 mG/sulfamethoxazole 800 mG 1 Tablet(s) Oral every 12 hours  voriconazole 200 milliGRAM(s) Oral every 12 hours  acyclovir   Oral Tab/Cap 400 milliGRAM(s) Oral every 8 hours  2. VOD Prophylaxis: Actigall, Glutamine, Heparin (dosed at 100 units / kg / day)     3. GI Prophylaxis:  Protonix    4. Mouthcare - NS / NaHCO3 rinses, Mycelex, Biotene; Skin care     5. GVHD prophylaxis     6. Transfuse & replete electrolytes prn      7. IV hydration, daily weights, strict I&O, prn diuresis       8. PO intake as tolerated, nutrition follow up as needed, MVI, folic acid     9. Antiemetics, anti-diarrhea medications:      10. OOB as tolerated, physical therapy consult if needed     11. Monitor coags / fibrinogen 2x week, vitamin K as needed     12. Monitor closely for clinical changes, monitor for fevers     13. Emotional support provided, plan of care discussed and questions addressed     14. Patient education done regarding chemotherapy prep, plan of care, restrictions and discharge planning     15. Continue regular social work input     I have written the above note for Dr. Gaytan  who performed service with me in the room.   Suki Sage  NP-C ([phone])    I have seen and examined patient with NP, I agree with above note as scribed.

## 2019-10-07 NOTE — PROGRESS NOTE ADULT - ASSESSMENT
Mr. Delong is a 59 year old male, with previously diagnosed acute myeloid leukemia. s/p induction chemotherapy with Daunorubicin/Cytarabine and HIDAC consolidation chemotherapy, now admitted for Haplo-identical stem cell transplant from son:    - No evidence of volume overload at present.  He is on fluids and has been getting IV Lasix prn to maintain net negative.  He continues net negative.  - Continue to maintain strict I's and O's, and to monitor for signs of volume overload, which he is at risk for.  - No evidence of acute ischemia  - HR and BP are controlled  - Continue ACEI and BB at current doses as tolerated by BP.  He was able to get both this morning.  - Continue heparin gtt, and adjust rate per protocol. Monitor platelet count which remains >50  - Monitor and replete lytes  - To follow while admitted

## 2019-10-07 NOTE — PROGRESS NOTE ADULT - ATTENDING COMMENTS
59 year old male, with previously diagnosed acute myeloid leukemia. s/p induction chemotherapy with Daunorubicin/Cytarabine and  HIDAC consolidation chemotherapy, now admitted for Haplo-identical stem cell transplant from son. Conditioning regimen of Fludarabine, Cytoxan, and TBI. Other history includes non-ischemic cardiomyopathy (recent EF 25% 9/19/19), COLLEEN, DVT/PE, HTN, Factor V Leiden and amputation of right toe due to osteomyelitis.  Today is day -4 of therapy.    Strict I&O, daily weights, prn diuresis   Renal insufficiency- monitor daily creatinine  Will also get IVIG 0.4 g/kg (ideal body weight) every 3 weeks for GVHD ppx. Premedication with Tylenol and benadryl  TBI on day -1   Start Zarxio on day +5,  MMF and Tacrolimus. Check Tacrolimus levels on Monday and Thursday.   Anxiolytics, antinausea, antidiarrhea medications as needed   Lasix PRN while being aggressively hydrated to avoid VOD   Nutritional support, pain management.  hx non-ischemic CM, followed by Dr. Luke Lyn, last Echo 9/19 (EF 25%)  VOD prophylaxis - low dose heparin gtt (dosed at 100 units / kg / day), glutamine supplementation, Actigall BID   ID ppx - Bactrim DS through day -2 , Acyclovir 400mg po TID to start day -1; continue Voriconazole for h/o fungal infection  GI prophylaxis - Protonix po QD   Kepivance for prevention of mucositis- days 0, +1, +2  Aggressive mouth care and skin care as per protocol. 59 year old male, with previously diagnosed acute myeloid leukemia. s/p induction chemotherapy with Daunorubicin/Cytarabine and  HIDAC consolidation chemotherapy, now admitted for Haplo-identical stem cell transplant from son. Conditioning regimen of Fludarabine, Cytoxan, and TBI. Other history includes non-ischemic cardiomyopathy (recent EF 25% 9/19/19), COLLEEN, DVT/PE, HTN, Factor V Leiden and amputation of right toe due to osteomyelitis.  Today is day -2 of therapy.    Strict I&O, daily weights, prn diuresis   Renal insufficiency- monitor daily creatinine  Will also get IVIG 0.4 g/kg (ideal body weight)  for GVHD ppx. Premedication with Tylenol and benadryl  TBI on day -1   Start Zarxio on day +5,  MMF and Tacrolimus. Check Tacrolimus levels on Monday and Thursday.   Anxiolytics, antinausea, antidiarrhea medications as needed   Lasix PRN while being aggressively hydrated to avoid VOD   Nutritional support, pain management.  hx non-ischemic CM, followed by Dr. Luke Lyn, last Echo 9/19 (EF 25%)  VOD prophylaxis - low dose heparin gtt (dosed at 100 units / kg / day), glutamine supplementation, Actigall BID   ID ppx - Bactrim DS through day -2 , Acyclovir 400mg po TID to start day -1; continue Voriconazole for h/o fungal infection  GI prophylaxis - Protonix po QD   Kepivance for prevention of mucositis- days 0, +1, +2  Aggressive mouth care and skin care as per protocol.

## 2019-10-07 NOTE — PROGRESS NOTE ADULT - SUBJECTIVE AND OBJECTIVE BOX
[facility] - Ede Zamudio, Riki, Edgar, Gem, Tabitha Garcia  Office Number:  [phone]    Patient resting comfortably in bed in NAD.  Laying flat with no respiratory distress.  No complaints of chest pain, dyspnea, palpitations, PND, or orthopnea.  feeling well this morning.    ROS: negative unless otherwise mentioned.    Telemetry:  off    MEDICATIONS  (STANDING):  acetaminophen   Tablet .. 650 milliGRAM(s) Oral every 6 hours  acetaminophen   Tablet .. 650 milliGRAM(s) Oral <User Schedule>  acyclovir   Oral Tab/Cap 400 milliGRAM(s) Oral every 8 hours  ALPRAZolam 0.5 milliGRAM(s) Oral at bedtime  aprepitant 80 milliGRAM(s) Oral every 24 hours  Biotene Dry Mouth Oral Rinse 5 milliLiter(s) Swish and Spit five times a day  chlorhexidine 4% Liquid 1 Application(s) Topical <User Schedule>  clotrimazole Lozenge 1 Lozenge Oral five times a day  diphenhydrAMINE   Injectable 25 milliGRAM(s) IV Push <User Schedule>  docusate sodium 100 milliGRAM(s) Oral three times a day  enalapril 2.5 milliGRAM(s) Oral daily  finasteride 5 milliGRAM(s) Oral daily  fludarabine IVPB (eMAR) 59 milliGRAM(s) IV Intermittent every 24 hours  folic acid 1 milliGRAM(s) Oral daily  heparin  Infusion 339 Unit(s)/Hr (3.39 mL/Hr) IV Continuous <Continuous>  immune   globulin 10% (GAMMAGARD) IVPB 30 Gram(s) IV Intermittent <User Schedule>  metoprolol succinate ER 12.5 milliGRAM(s) Oral two times a day  multivitamin 1 Tablet(s) Oral daily  ondansetron Injectable 8 milliGRAM(s) IV Push every 8 hours  pantoprazole    Tablet 40 milliGRAM(s) Oral before breakfast  senna 2 Tablet(s) Oral at bedtime  sertraline 50 milliGRAM(s) Oral at bedtime  sodium bicarbonate Mouth Rinse 10 milliLiter(s) Swish and Spit five times a day  sodium chloride 0.45%. 1000 milliLiter(s) (200 mL/Hr) IV Continuous <Continuous>  sodium chloride 0.9%. 1000 milliLiter(s) (20 mL/Hr) IV Continuous <Continuous>  tamsulosin 0.4 milliGRAM(s) Oral at bedtime  trimethoprim  160 mG/sulfamethoxazole 800 mG 1 Tablet(s) Oral every 12 hours  ursodiol Capsule 300 milliGRAM(s) Oral two times a day with meals  voriconazole 200 milliGRAM(s) Oral every 12 hours    MEDICATIONS  (PRN):  acetaminophen   Tablet .. 650 milliGRAM(s) Oral every 6 hours PRN Temp greater or equal to 38C (100.4F), Mild Pain (1 - 3)  diphenhydrAMINE   Injectable 25 milliGRAM(s) IV Push every 4 hours PRN Allergy symptoms  metoclopramide Injectable 10 milliGRAM(s) IV Push every 6 hours PRN Nausea and/or Vomiting  sodium chloride 0.9% lock flush 10 milliLiter(s) IV Push every 1 hour PRN Pre/post blood products, medications, blood draw, and to maintain line patency      Allergies    No Known Allergies    Intolerances        Vital Signs Last 24 Hrs  T(C): 36.2 (07 Oct 2019 05:56), Max: 36.8 (06 Oct 2019 09:26)  T(F): 97.1 (07 Oct 2019 05:56), Max: 98.2 (06 Oct 2019 09:26)  HR: 88 (07 Oct 2019 05:56) (75 - 103)  BP: 102/64 (07 Oct 2019 05:56) (100/75 - 125/78)  BP(mean): --  RR: 18 (07 Oct 2019 05:56) (18 - 19)  SpO2: 97% (07 Oct 2019 05:56) (97% - 99%)    I&O's Summary    06 Oct 2019 07:01  -  07 Oct 2019 07:00  --------------------------------------------------------  IN: 1328.7 mL / OUT: 1800 mL / NET: -471.3 mL        ON EXAM:    General: NAD, awake and alert, oriented x 3  HEENT: Mucous membranes are moist, anicteric  Lungs: Non-labored, breath sounds are clear bilaterally, No wheezing, rales or rhonchi  Cardiovascular: Regular, S1 and S2, no murmurs, rubs, or gallops  Gastrointestinal: Bowel Sounds present, soft, nontender.   Lymph: No peripheral edema. No lymphadenopathy.  Skin: No rashes or ulcers  Psych:  Mood & affect appropriate  LABS: All Labs Reviewed:                        8.6    2.65  )-----------( 57       ( 07 Oct 2019 07:07 )             26.2                         9.4    4.59  )-----------( 59       ( 06 Oct 2019 06:48 )             27.9                         8.8    5.27  )-----------( 58       ( 05 Oct 2019 06:44 )             26.3     07 Oct 2019 07:03    132    |  99     |  25     ----------------------------<  99     4.4     |  25     |  1.40   06 Oct 2019 06:48    135    |  99     |  26     ----------------------------<  101    4.2     |  23     |  1.49   05 Oct 2019 06:44    136    |  102    |  19     ----------------------------<  99     4.0     |  21     |  1.35     Ca    9.6        07 Oct 2019 07:03  Ca    9.1        06 Oct 2019 06:48  Ca    8.3        05 Oct 2019 06:44  Phos  4.4       07 Oct 2019 07:03  Phos  5.3       06 Oct 2019 06:48  Phos  4.3       05 Oct 2019 06:44  Mg     2.0       07 Oct 2019 07:03  Mg     2.0       06 Oct 2019 06:48  Mg     1.7       05 Oct 2019 06:44    TPro  6.1    /  Alb  4.1    /  TBili  0.5    /  DBili  0.1    /  AST  28     /  ALT  21     /  AlkPhos  72     07 Oct 2019 07:03  TPro  6.4    /  Alb  3.9    /  TBili  0.3    /  DBili  x      /  AST  18     /  ALT  19     /  AlkPhos  76     06 Oct 2019 06:48  TPro  5.7    /  Alb  3.5    /  TBili  0.2    /  DBili  x      /  AST  25     /  ALT  22     /  AlkPhos  69     05 Oct 2019 06:44    PT/INR - ( 07 Oct 2019 07:16 )   PT: 10.9 sec;   INR: 0.95 ratio         PTT - ( 07 Oct 2019 07:16 )  PTT:30.8 sec      Blood Culture:

## 2019-10-08 ENCOUNTER — OUTPATIENT (OUTPATIENT)
Dept: OUTPATIENT SERVICES | Facility: HOSPITAL | Age: 59
LOS: 1 days | Discharge: HOME | End: 2019-10-08
Payer: COMMERCIAL

## 2019-10-08 LAB
ALBUMIN SERPL ELPH-MCNC: 3.5 G/DL — SIGNIFICANT CHANGE UP (ref 3.3–5)
ALP SERPL-CCNC: 65 U/L — SIGNIFICANT CHANGE UP (ref 40–120)
ALT FLD-CCNC: 21 U/L — SIGNIFICANT CHANGE UP (ref 10–45)
ANION GAP SERPL CALC-SCNC: 10 MMOL/L — SIGNIFICANT CHANGE UP (ref 5–17)
ANISOCYTOSIS BLD QL: SLIGHT — SIGNIFICANT CHANGE UP
AST SERPL-CCNC: 33 U/L — SIGNIFICANT CHANGE UP (ref 10–40)
BASOPHILS # BLD AUTO: 0 K/UL — SIGNIFICANT CHANGE UP (ref 0–0.2)
BASOPHILS NFR BLD AUTO: 0 % — SIGNIFICANT CHANGE UP (ref 0–2)
BILIRUB SERPL-MCNC: 0.3 MG/DL — SIGNIFICANT CHANGE UP (ref 0.2–1.2)
BUN SERPL-MCNC: 30 MG/DL — HIGH (ref 7–23)
CALCIUM SERPL-MCNC: 9.4 MG/DL — SIGNIFICANT CHANGE UP (ref 8.4–10.5)
CHLORIDE SERPL-SCNC: 103 MMOL/L — SIGNIFICANT CHANGE UP (ref 96–108)
CO2 SERPL-SCNC: 23 MMOL/L — SIGNIFICANT CHANGE UP (ref 22–31)
CREAT SERPL-MCNC: 1.45 MG/DL — HIGH (ref 0.5–1.3)
EOSINOPHIL # BLD AUTO: 0.01 K/UL — SIGNIFICANT CHANGE UP (ref 0–0.5)
EOSINOPHIL NFR BLD AUTO: 0.9 % — SIGNIFICANT CHANGE UP (ref 0–6)
GIANT PLATELETS BLD QL SMEAR: PRESENT — SIGNIFICANT CHANGE UP
GLUCOSE SERPL-MCNC: 98 MG/DL — SIGNIFICANT CHANGE UP (ref 70–99)
HCT VFR BLD CALC: 24.6 % — LOW (ref 39–50)
HGB BLD-MCNC: 8.2 G/DL — LOW (ref 13–17)
LDH SERPL L TO P-CCNC: 170 U/L — SIGNIFICANT CHANGE UP (ref 50–242)
LYMPHOCYTES # BLD AUTO: 0 % — LOW (ref 13–44)
LYMPHOCYTES # BLD AUTO: 0 K/UL — LOW (ref 1–3.3)
MACROCYTES BLD QL: SLIGHT — SIGNIFICANT CHANGE UP
MAGNESIUM SERPL-MCNC: 2.1 MG/DL — SIGNIFICANT CHANGE UP (ref 1.6–2.6)
MANUAL SMEAR VERIFICATION: SIGNIFICANT CHANGE UP
MCHC RBC-ENTMCNC: 31.7 PG — SIGNIFICANT CHANGE UP (ref 27–34)
MCHC RBC-ENTMCNC: 33.3 GM/DL — SIGNIFICANT CHANGE UP (ref 32–36)
MCV RBC AUTO: 95 FL — SIGNIFICANT CHANGE UP (ref 80–100)
MONOCYTES # BLD AUTO: 0 K/UL — SIGNIFICANT CHANGE UP (ref 0–0.9)
MONOCYTES NFR BLD AUTO: 0 % — LOW (ref 2–14)
NEUTROPHILS # BLD AUTO: 1.57 K/UL — LOW (ref 1.8–7.4)
NEUTROPHILS NFR BLD AUTO: 98.2 % — HIGH (ref 43–77)
NEUTS BAND # BLD: 0.9 % — SIGNIFICANT CHANGE UP (ref 0–8)
PHOSPHATE SERPL-MCNC: 4.5 MG/DL — SIGNIFICANT CHANGE UP (ref 2.5–4.5)
PLAT MORPH BLD: ABNORMAL
PLATELET # BLD AUTO: 49 K/UL — LOW (ref 150–400)
POIKILOCYTOSIS BLD QL AUTO: SLIGHT — SIGNIFICANT CHANGE UP
POTASSIUM SERPL-MCNC: 4.7 MMOL/L — SIGNIFICANT CHANGE UP (ref 3.5–5.3)
POTASSIUM SERPL-SCNC: 4.7 MMOL/L — SIGNIFICANT CHANGE UP (ref 3.5–5.3)
PROT SERPL-MCNC: 6.3 G/DL — SIGNIFICANT CHANGE UP (ref 6–8.3)
RBC # BLD: 2.59 M/UL — LOW (ref 4.2–5.8)
RBC # FLD: 16.9 % — HIGH (ref 10.3–14.5)
RBC BLD AUTO: SIGNIFICANT CHANGE UP
SODIUM SERPL-SCNC: 136 MMOL/L — SIGNIFICANT CHANGE UP (ref 135–145)
WBC # BLD: 1.58 K/UL — LOW (ref 3.8–10.5)
WBC # FLD AUTO: 1.58 K/UL — LOW (ref 3.8–10.5)

## 2019-10-08 PROCEDURE — 99291 CRITICAL CARE FIRST HOUR: CPT

## 2019-10-08 PROCEDURE — 77431 RADIATION THERAPY MANAGEMENT: CPT

## 2019-10-08 PROCEDURE — 77331 SPECIAL RADIATION DOSIMETRY: CPT | Mod: 26

## 2019-10-08 RX ORDER — CIPROFLOXACIN LACTATE 400MG/40ML
500 VIAL (ML) INTRAVENOUS EVERY 12 HOURS
Refills: 0 | Status: DISCONTINUED | OUTPATIENT
Start: 2019-10-08 | End: 2019-10-10

## 2019-10-08 RX ADMIN — Medication 10 MILLILITER(S): at 16:11

## 2019-10-08 RX ADMIN — Medication 0.5 MILLIGRAM(S): at 21:06

## 2019-10-08 RX ADMIN — Medication 400 MILLIGRAM(S): at 15:31

## 2019-10-08 RX ADMIN — Medication 1 LOZENGE: at 12:11

## 2019-10-08 RX ADMIN — Medication 10 MILLILITER(S): at 20:10

## 2019-10-08 RX ADMIN — SERTRALINE 50 MILLIGRAM(S): 25 TABLET, FILM COATED ORAL at 21:06

## 2019-10-08 RX ADMIN — APREPITANT 80 MILLIGRAM(S): 80 CAPSULE ORAL at 08:02

## 2019-10-08 RX ADMIN — Medication 1 LOZENGE: at 08:01

## 2019-10-08 RX ADMIN — VORICONAZOLE 200 MILLIGRAM(S): 10 INJECTION, POWDER, LYOPHILIZED, FOR SOLUTION INTRAVENOUS at 06:28

## 2019-10-08 RX ADMIN — Medication 400 MILLIGRAM(S): at 06:28

## 2019-10-08 RX ADMIN — Medication 5 MILLILITER(S): at 12:11

## 2019-10-08 RX ADMIN — Medication 10 MILLILITER(S): at 08:01

## 2019-10-08 RX ADMIN — ONDANSETRON 8 MILLIGRAM(S): 8 TABLET, FILM COATED ORAL at 01:47

## 2019-10-08 RX ADMIN — Medication 5 MILLILITER(S): at 00:56

## 2019-10-08 RX ADMIN — TAMSULOSIN HYDROCHLORIDE 0.4 MILLIGRAM(S): 0.4 CAPSULE ORAL at 21:06

## 2019-10-08 RX ADMIN — Medication 1 MILLIGRAM(S): at 12:14

## 2019-10-08 RX ADMIN — Medication 1 LOZENGE: at 16:11

## 2019-10-08 RX ADMIN — HEPARIN SODIUM 3.39 UNIT(S)/HR: 5000 INJECTION INTRAVENOUS; SUBCUTANEOUS at 00:56

## 2019-10-08 RX ADMIN — Medication 10 MILLILITER(S): at 12:11

## 2019-10-08 RX ADMIN — FINASTERIDE 5 MILLIGRAM(S): 5 TABLET, FILM COATED ORAL at 12:14

## 2019-10-08 RX ADMIN — VORICONAZOLE 200 MILLIGRAM(S): 10 INJECTION, POWDER, LYOPHILIZED, FOR SOLUTION INTRAVENOUS at 17:46

## 2019-10-08 RX ADMIN — Medication 5 MILLILITER(S): at 20:10

## 2019-10-08 RX ADMIN — Medication 1 LOZENGE: at 00:56

## 2019-10-08 RX ADMIN — Medication 500 MILLIGRAM(S): at 17:46

## 2019-10-08 RX ADMIN — Medication 1 TABLET(S): at 12:14

## 2019-10-08 RX ADMIN — ONDANSETRON 8 MILLIGRAM(S): 8 TABLET, FILM COATED ORAL at 10:00

## 2019-10-08 RX ADMIN — SODIUM CHLORIDE 20 MILLILITER(S): 9 INJECTION INTRAMUSCULAR; INTRAVENOUS; SUBCUTANEOUS at 00:57

## 2019-10-08 RX ADMIN — Medication 5 MILLILITER(S): at 16:10

## 2019-10-08 RX ADMIN — Medication 5 MILLILITER(S): at 08:01

## 2019-10-08 RX ADMIN — Medication 10 MILLILITER(S): at 00:56

## 2019-10-08 RX ADMIN — ONDANSETRON 8 MILLIGRAM(S): 8 TABLET, FILM COATED ORAL at 17:47

## 2019-10-08 RX ADMIN — SODIUM CHLORIDE 150 MILLILITER(S): 9 INJECTION, SOLUTION INTRAVENOUS at 22:03

## 2019-10-08 RX ADMIN — Medication 1 LOZENGE: at 20:10

## 2019-10-08 RX ADMIN — Medication 400 MILLIGRAM(S): at 21:06

## 2019-10-08 RX ADMIN — PANTOPRAZOLE SODIUM 40 MILLIGRAM(S): 20 TABLET, DELAYED RELEASE ORAL at 06:27

## 2019-10-08 RX ADMIN — URSODIOL 300 MILLIGRAM(S): 250 TABLET, FILM COATED ORAL at 17:46

## 2019-10-08 RX ADMIN — Medication 12.5 MILLIGRAM(S): at 17:48

## 2019-10-08 RX ADMIN — URSODIOL 300 MILLIGRAM(S): 250 TABLET, FILM COATED ORAL at 08:01

## 2019-10-08 NOTE — CHART NOTE - NSCHARTNOTEFT_GEN_A_CORE
Pt seen for nutrition follow up. Pt with AML day-1 pre allogeneic PBSCT    Source: Patient [x ]    Family [ ]     other [ ]    Diet : Regular diet with glutasolve 1 packet daily       Patient denies N+V, last BM today per pt     PO intake:  Pt reports eating well with good appetite generally eating >75% of meals, pt had 75% of breakfast this morning and 100% of lunch. Pt regularly takes milkshakes though [employer] program       Current Weight: 179.6 lbs (10/3), 180.1 lbs (10/5), 172.8 lbs (10/6), 175.2 lbs (10/8), weight fluctuations noted, likely related to fluid shifts.   % Weight Change    Pertinent Medications: MEDICATIONS  (STANDING):  acetaminophen   Tablet .. 650 milliGRAM(s) Oral every 6 hours  acetaminophen   Tablet .. 650 milliGRAM(s) Oral <User Schedule>  acyclovir   Oral Tab/Cap 400 milliGRAM(s) Oral every 8 hours  ALPRAZolam 0.5 milliGRAM(s) Oral at bedtime  aprepitant 80 milliGRAM(s) Oral every 24 hours  Biotene Dry Mouth Oral Rinse 5 milliLiter(s) Swish and Spit five times a day  chlorhexidine 4% Liquid 1 Application(s) Topical <User Schedule>  ciprofloxacin     Tablet 500 milliGRAM(s) Oral every 12 hours  clotrimazole Lozenge 1 Lozenge Oral five times a day  diphenhydrAMINE   Injectable 25 milliGRAM(s) IV Push <User Schedule>  docusate sodium 100 milliGRAM(s) Oral three times a day  enalapril 2.5 milliGRAM(s) Oral daily  finasteride 5 milliGRAM(s) Oral daily  folic acid 1 milliGRAM(s) Oral daily  heparin  Infusion 339 Unit(s)/Hr (3.39 mL/Hr) IV Continuous <Continuous>  immune   globulin 10% (GAMMAGARD) IVPB 30 Gram(s) IV Intermittent <User Schedule>  metoprolol succinate ER 12.5 milliGRAM(s) Oral two times a day  multivitamin 1 Tablet(s) Oral daily  ondansetron Injectable 8 milliGRAM(s) IV Push every 8 hours  pantoprazole    Tablet 40 milliGRAM(s) Oral before breakfast  senna 2 Tablet(s) Oral at bedtime  sertraline 50 milliGRAM(s) Oral at bedtime  sodium bicarbonate Mouth Rinse 10 milliLiter(s) Swish and Spit five times a day  sodium chloride 0.45% 1000 milliLiter(s) (150 mL/Hr) IV Continuous <Continuous>  sodium chloride 0.45%. 1000 milliLiter(s) (200 mL/Hr) IV Continuous <Continuous>  sodium chloride 0.9%. 1000 milliLiter(s) (20 mL/Hr) IV Continuous <Continuous>  tamsulosin 0.4 milliGRAM(s) Oral at bedtime  ursodiol Capsule 300 milliGRAM(s) Oral two times a day with meals  voriconazole 200 milliGRAM(s) Oral every 12 hours    MEDICATIONS  (PRN):  acetaminophen   Tablet .. 650 milliGRAM(s) Oral every 6 hours PRN Temp greater or equal to 38C (100.4F), Mild Pain (1 - 3)  diphenhydrAMINE   Injectable 25 milliGRAM(s) IV Push every 4 hours PRN Allergy symptoms  metoclopramide Injectable 10 milliGRAM(s) IV Push every 6 hours PRN Nausea and/or Vomiting  sodium chloride 0.9% lock flush 10 milliLiter(s) IV Push every 1 hour PRN Pre/post blood products, medications, blood draw, and to maintain line patency    Pertinent Labs:  10-08 Na136 mmol/L Glu 98 mg/dL K+ 4.7 mmol/L Cr  1.45 mg/dL<H> BUN 30 mg/dL<H> 10-08 Phos 4.5 mg/dL 10-08 Alb 3.5 g/dL      Skin: No edema, skin intact    Estimated Needs:   [ x] no change since previous assessment  [ ] recalculated:       Previous Nutrition Diagnosis:     [ x] Predicted suboptimal energy intake           Nutrition Diagnosis is [x ] ongoing,          New Nutrition Diagnosis: [x ] not applicable       Interventions:     Recommend    1. Continue diet as ordered, will continue to provide milkshakes through [employer] program   2. Continue to encourage po intake and obtain/honor food preferences as able      Monitoring and Evaluation:     [x ] PO intake [ x] Tolerance to diet prescription [ x] weights [x ] follow up per protocol    [ ] other:

## 2019-10-08 NOTE — PROGRESS NOTE ADULT - ATTENDING COMMENTS
59 year old male, with previously diagnosed acute myeloid leukemia. s/p induction chemotherapy with Daunorubicin/Cytarabine and  HIDAC consolidation chemotherapy, now admitted for Haplo-identical stem cell transplant from son. Conditioning regimen of Fludarabine, Cytoxan, and TBI. Other history includes non-ischemic cardiomyopathy (recent EF 25% 9/19/19), COLLEEN, DVT/PE, HTN, Factor V Leiden and amputation of right toe due to osteomyelitis.  Today is day - 1 of therapy    Strict I&O, daily weights, prn diuresis   Renal insufficiency- monitor daily creatinine  Will also get IVIG 0.4 g/kg (ideal body weight)  for GVHD ppx. Premedication with Tylenol and benadryl  TBI on day -1   Start Zarxio on day +5,  MMF and Tacrolimus. Check Tacrolimus levels on Monday and Thursday.   Anxiolytics, antinausea, antidiarrhea medications as needed   Lasix PRN while being aggressively hydrated to avoid VOD   Nutritional support, pain management.  hx non-ischemic CM, followed by Dr. Luke Lyn, last Echo 9/19 (EF 25%)  VOD prophylaxis - low dose heparin gtt (dosed at 100 units / kg / day), glutamine supplementation, Actigall BID   ID ppx - Bactrim DS through day -2 , Acyclovir 400mg po TID to start day -1; continue Voriconazole for h/o fungal infection  GI prophylaxis - Protonix po QD   Kepivance for prevention of mucositis- days 0, +1, +2  Aggressive mouth care and skin care as per protocol. 59 year old male, with previously diagnosed acute myeloid leukemia. s/p induction chemotherapy with Daunorubicin/Cytarabine and  HIDAC consolidation chemotherapy, now admitted for Haplo-identical stem cell transplant from son. Conditioning regimen of Fludarabine, Cytoxan, and TBI. Other history includes non-ischemic cardiomyopathy (recent EF 25% 9/19/19), COLLEEN, DVT/PE, HTN, Factor V Leiden and amputation of right toe due to osteomyelitis.  Today is day - 1 of therapy    Strict I&O, daily weights, prn diuresis   Renal insufficiency- monitor daily creatinine  Will also get IVIG 0.4 g/kg (ideal body weight)  for GVHD ppx. Premedication with Tylenol and benadryl  TBI on day -1   Start Zarxio on day +5,  MMF and Tacrolimus. Check Tacrolimus levels on Monday and Thursday.   Anxiolytics, antinausea, antidiarrhea medications as needed   Lasix PRN while being aggressively hydrated to avoid VOD   Nutritional support, pain management.  hx non-ischemic CM, followed by Dr. Luke Lyn, last Echo 9/19 (EF 25%)  VOD prophylaxis - low dose heparin gtt (dosed at 100 units / kg / day), glutamine supplementation, Actigall BID   ID ppx - Bactrim DS through day -2 , Acyclovir 400mg po TID to start day -1; continue Voriconazole for h/o fungal infection  GI prophylaxis - Protonix po QD.  Kepivance for prevention of mucositis- days 0, +1, +2  Aggressive mouth care and skin care as per protocol.

## 2019-10-08 NOTE — PROGRESS NOTE ADULT - SUBJECTIVE AND OBJECTIVE BOX
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    S: Patient seen and examined with HPC Transplant Team:     Denies mouth / tongue / throat pain, dyspnea, cough, nausea, vomiting, diarrhea, abdominal pain     O: Vitals:   Vital Signs Last 24 Hrs  T(C): 36.9 (08 Oct 2019 06:15), Max: 37.3 (07 Oct 2019 17:10)  T(F): 98.4 (08 Oct 2019 06:15), Max: 99.1 (07 Oct 2019 17:10)  HR: 85 (08 Oct 2019 06:15) (68 - 87)  BP: 100/62 (08 Oct 2019 06:15) (93/51 - 120/74)  BP(mean): --  RR: 18 (08 Oct 2019 06:15) (18 - 18)  SpO2: 96% (08 Oct 2019 06:15) (95% - 98%)    Admit weight: 81.5 Kg  Daily Weight in k.9 (07 Oct 2019 09:12)  Today's weight:     Intake / Output:   10-07 @ 07:01  -  10-08 @ 07:00  --------------------------------------------------------  IN: 1990.6 mL / OUT: 2500 mL / NET: -509.4 mL      PE:   Oropharynx: no ulceration or erythema  Oral Mucositis:      none                                                  Grade:   CVS: reg S1 S2  Lungs: CTA  Abdomen: soft, + normoactive BS, NT, ND  Extremities: No C/C/E  Gastric Mucositis:     none                                             Grade:   Intestinal Mucositis:      none                                        Grade:   Skin: no rash  TLC:  C/D/I  Neuro: Alert and oriented x3  Pain: none    Labs:   CBC Full  -  ( 08 Oct 2019 07:28 )  WBC Count : 1.58 K/uL  Hemoglobin : 8.2 g/dL  Hematocrit : 24.6 %  Platelet Count - Automated : 49 K/uL  Mean Cell Volume : 95.0 fl  Mean Cell Hemoglobin : 31.7 pg  Mean Cell Hemoglobin Concentration : 33.3 gm/dL  Auto Neutrophil # : 1.57 K/uL  Auto Lymphocyte # : 0.00 K/uL  Auto Monocyte # : 0.00 K/uL  Auto Eosinophil # : 0.01 K/uL  Auto Basophil # : 0.00 K/uL  Auto Neutrophil % : 98.2 %  Auto Lymphocyte % : 0.0 %  Auto Monocyte % : 0.0 %  Auto Eosinophil % : 0.9 %  Auto Basophil % : 0.0 %                          8.2    1.58  )-----------( 49       ( 08 Oct 2019 07:28 )             24.6     10    136  |  103  |  30<H>  ----------------------------<  98  4.7   |  23  |  1.45<H>    Ca    9.4      08 Oct 2019 07:14  Phos  4.5     10-08  Mg     2.1     10-08    TPro  6.3  /  Alb  3.5  /  TBili  0.3  /  DBili  x   /  AST  33  /  ALT  21  /  AlkPhos  65  10    PT/INR - ( 07 Oct 2019 07:16 )   PT: 10.9 sec;   INR: 0.95 ratio         PTT - ( 07 Oct 2019 07:16 )  PTT:30.8 sec  LIVER FUNCTIONS - ( 08 Oct 2019 07:14 )  Alb: 3.5 g/dL / Pro: 6.3 g/dL / ALK PHOS: 65 U/L / ALT: 21 U/L / AST: 33 U/L / GGT: x           Lactate Dehydrogenase, Serum: 170 U/L (10-08 @ 07:14)    Meds:   Antimicrobials:   acyclovir   Oral Tab/Cap 400 milliGRAM(s) Oral every 8 hours  ciprofloxacin     Tablet 500 milliGRAM(s) Oral every 12 hours  clotrimazole Lozenge 1 Lozenge Oral five times a day  voriconazole 200 milliGRAM(s) Oral every 12 hours      Heme / Onc:   heparin  Infusion 339 Unit(s)/Hr IV Continuous <Continuous>      GI:  docusate sodium 100 milliGRAM(s) Oral three times a day  pantoprazole    Tablet 40 milliGRAM(s) Oral before breakfast  senna 2 Tablet(s) Oral at bedtime  sodium bicarbonate Mouth Rinse 10 milliLiter(s) Swish and Spit five times a day  ursodiol Capsule 300 milliGRAM(s) Oral two times a day with meals      Cardiovascular:   enalapril 2.5 milliGRAM(s) Oral daily  metoprolol succinate ER 12.5 milliGRAM(s) Oral two times a day  tamsulosin 0.4 milliGRAM(s) Oral at bedtime      Immunologic:   immune   globulin 10% (GAMMAGARD) IVPB 30 Gram(s) IV Intermittent <User Schedule>      Other medications:   acetaminophen   Tablet .. 650 milliGRAM(s) Oral every 6 hours  acetaminophen   Tablet .. 650 milliGRAM(s) Oral <User Schedule>  ALPRAZolam 0.5 milliGRAM(s) Oral at bedtime  aprepitant 80 milliGRAM(s) Oral every 24 hours  Biotene Dry Mouth Oral Rinse 5 milliLiter(s) Swish and Spit five times a day  chlorhexidine 4% Liquid 1 Application(s) Topical <User Schedule>  diphenhydrAMINE   Injectable 25 milliGRAM(s) IV Push <User Schedule>  finasteride 5 milliGRAM(s) Oral daily  folic acid 1 milliGRAM(s) Oral daily  multivitamin 1 Tablet(s) Oral daily  ondansetron Injectable 8 milliGRAM(s) IV Push every 8 hours  sertraline 50 milliGRAM(s) Oral at bedtime  sodium chloride 0.45% 1000 milliLiter(s) IV Continuous <Continuous>  sodium chloride 0.45%. 1000 milliLiter(s) IV Continuous <Continuous>  sodium chloride 0.9%. 1000 milliLiter(s) IV Continuous <Continuous>      PRN:   acetaminophen   Tablet .. 650 milliGRAM(s) Oral every 6 hours PRN  diphenhydrAMINE   Injectable 25 milliGRAM(s) IV Push every 4 hours PRN  metoclopramide Injectable 10 milliGRAM(s) IV Push every 6 hours PRN  sodium chloride 0.9% lock flush 10 milliLiter(s) IV Push every 1 hour PRN      A/P:  59 year old male with a history of AML  Pre :  Allogeneic PBSCT day - 1  TBI today   Start transplant hydration at 2200  Strict I&O, daily weights, prn diuresis   Start cipro for ANC < 1000; if T >/= 38C, pan cx, CXR & change cipro to cefepime 2g IV q 8 hours     1. Infectious Disease:   acyclovir   Oral Tab/Cap 400 milliGRAM(s) Oral every 8 hours  ciprofloxacin     Tablet 500 milliGRAM(s) Oral every 12 hours  clotrimazole Lozenge 1 Lozenge Oral five times a day  voriconazole 200 milliGRAM(s) Oral every 12 hours    2. VOD Prophylaxis: Actigall, Glutamine, Heparin (dosed at 100 units / kg / day)     3. GI Prophylaxis:    pantoprazole    Tablet 40 milliGRAM(s) Oral before breakfast    4. Mouthcare - NS / NaHCO3 rinses, Mycelex, Biotene; Skin care     5. GVHD prophylaxis   Tacrolimus / MMF to start on day + 5   CTX on day + 3, + 4   TBI day -1     6. Transfuse & replete electrolytes prn     7. IV hydration, daily weights, strict I&O, prn diuresis     8. PO intake as tolerated, nutrition follow up as needed, MVI, folic acid     9. Antiemetics, anti-diarrhea medications:   ondansetron Injectable 8 milliGRAM(s) IV Push every 8 hours  metoclopramide Injectable 10 milliGRAM(s) IV Push every 6 hours PRN  aprepitant 80 milliGRAM(s) Oral every 24 hours    10. OOB as tolerated, physical therapy consult if needed     11. Monitor coags / fibrinogen 2x week, vitamin K as needed     12. Monitor closely for clinical changes, monitor for fevers     13. Emotional support provided, plan of care discussed and questions addressed     14. Patient education done regarding plan of care, restrictions and discharge planning     15. Continue regular social work input     I have written the above note for Dr. Gaytan who performed service with me in the room.   Susan Grey NP-C ([phone])    I have seen and examined patient with NP, I agree with above note as scribed. 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    S: Patient seen and examined with HPC Transplant Team:   + fatigue   + headache this am - relieved with tylenol   Denies mouth / tongue / throat pain, dyspnea, cough, nausea, vomiting, diarrhea, abdominal pain     O: Vitals:   Vital Signs Last 24 Hrs  T(C): 36.9 (08 Oct 2019 06:15), Max: 37.3 (07 Oct 2019 17:10)  T(F): 98.4 (08 Oct 2019 06:15), Max: 99.1 (07 Oct 2019 17:10)  HR: 85 (08 Oct 2019 06:15) (68 - 87)  BP: 100/62 (08 Oct 2019 06:15) (93/51 - 120/74)  BP(mean): --  RR: 18 (08 Oct 2019 06:15) (18 - 18)  SpO2: 96% (08 Oct 2019 06:15) (95% - 98%)    Admit weight: 81.5 Kg  Daily Weight in k.9 (07 Oct 2019 09:12)  Today's weight:     Intake / Output:   10-07 @ 07:01  -  10-08 @ 07:00  --------------------------------------------------------  IN: 1990.6 mL / OUT: 2500 mL / NET: -509.4 mL      PE:   Oropharynx: no ulceration or erythema  Oral Mucositis:      none                                                  Grade:   CVS: reg S1 S2  Lungs: CTA  Abdomen: soft, + normoactive BS, NT, ND  Extremities: No C/C/E  Gastric Mucositis:     none                                             Grade:   Intestinal Mucositis:      none                                        Grade:   Skin: no rash  TLC:  C/D/I  Neuro: Alert and oriented x3  Pain: none    Labs:   CBC Full  -  ( 08 Oct 2019 07:28 )  WBC Count : 1.58 K/uL  Hemoglobin : 8.2 g/dL  Hematocrit : 24.6 %  Platelet Count - Automated : 49 K/uL  Mean Cell Volume : 95.0 fl  Mean Cell Hemoglobin : 31.7 pg  Mean Cell Hemoglobin Concentration : 33.3 gm/dL  Auto Neutrophil # : 1.57 K/uL  Auto Lymphocyte # : 0.00 K/uL  Auto Monocyte # : 0.00 K/uL  Auto Eosinophil # : 0.01 K/uL  Auto Basophil # : 0.00 K/uL  Auto Neutrophil % : 98.2 %  Auto Lymphocyte % : 0.0 %  Auto Monocyte % : 0.0 %  Auto Eosinophil % : 0.9 %  Auto Basophil % : 0.0 %                          8.2    1.58  )-----------( 49       ( 08 Oct 2019 07:28 )             24.6     10-08    136  |  103  |  30<H>  ----------------------------<  98  4.7   |  23  |  1.45<H>    Ca    9.4      08 Oct 2019 07:14  Phos  4.5     10-08  Mg     2.1     10-08    TPro  6.3  /  Alb  3.5  /  TBili  0.3  /  DBili  x   /  AST  33  /  ALT  21  /  AlkPhos  65  10-08    PT/INR - ( 07 Oct 2019 07:16 )   PT: 10.9 sec;   INR: 0.95 ratio         PTT - ( 07 Oct 2019 07:16 )  PTT:30.8 sec  LIVER FUNCTIONS - ( 08 Oct 2019 07:14 )  Alb: 3.5 g/dL / Pro: 6.3 g/dL / ALK PHOS: 65 U/L / ALT: 21 U/L / AST: 33 U/L / GGT: x           Lactate Dehydrogenase, Serum: 170 U/L (10-08 @ 07:14)    Meds:   Antimicrobials:   acyclovir   Oral Tab/Cap 400 milliGRAM(s) Oral every 8 hours  ciprofloxacin     Tablet 500 milliGRAM(s) Oral every 12 hours  clotrimazole Lozenge 1 Lozenge Oral five times a day  voriconazole 200 milliGRAM(s) Oral every 12 hours      Heme / Onc:   heparin  Infusion 339 Unit(s)/Hr IV Continuous <Continuous>      GI:  docusate sodium 100 milliGRAM(s) Oral three times a day  pantoprazole    Tablet 40 milliGRAM(s) Oral before breakfast  senna 2 Tablet(s) Oral at bedtime  sodium bicarbonate Mouth Rinse 10 milliLiter(s) Swish and Spit five times a day  ursodiol Capsule 300 milliGRAM(s) Oral two times a day with meals      Cardiovascular:   enalapril 2.5 milliGRAM(s) Oral daily  metoprolol succinate ER 12.5 milliGRAM(s) Oral two times a day  tamsulosin 0.4 milliGRAM(s) Oral at bedtime      Immunologic:   immune   globulin 10% (GAMMAGARD) IVPB 30 Gram(s) IV Intermittent <User Schedule>      Other medications:   acetaminophen   Tablet .. 650 milliGRAM(s) Oral every 6 hours  acetaminophen   Tablet .. 650 milliGRAM(s) Oral <User Schedule>  ALPRAZolam 0.5 milliGRAM(s) Oral at bedtime  aprepitant 80 milliGRAM(s) Oral every 24 hours  Biotene Dry Mouth Oral Rinse 5 milliLiter(s) Swish and Spit five times a day  chlorhexidine 4% Liquid 1 Application(s) Topical <User Schedule>  diphenhydrAMINE   Injectable 25 milliGRAM(s) IV Push <User Schedule>  finasteride 5 milliGRAM(s) Oral daily  folic acid 1 milliGRAM(s) Oral daily  multivitamin 1 Tablet(s) Oral daily  ondansetron Injectable 8 milliGRAM(s) IV Push every 8 hours  sertraline 50 milliGRAM(s) Oral at bedtime  sodium chloride 0.45% 1000 milliLiter(s) IV Continuous <Continuous>  sodium chloride 0.45%. 1000 milliLiter(s) IV Continuous <Continuous>  sodium chloride 0.9%. 1000 milliLiter(s) IV Continuous <Continuous>      PRN:   acetaminophen   Tablet .. 650 milliGRAM(s) Oral every 6 hours PRN  diphenhydrAMINE   Injectable 25 milliGRAM(s) IV Push every 4 hours PRN  metoclopramide Injectable 10 milliGRAM(s) IV Push every 6 hours PRN  sodium chloride 0.9% lock flush 10 milliLiter(s) IV Push every 1 hour PRN      A/P:  59 year old male with a history of AML  Pre :  Allogeneic PBSCT day - 1  TBI today   Start transplant hydration at 2200  Strict I&O, daily weights, prn diuresis   Start cipro for ANC < 1000; if T >/= 38C, pan cx, CXR & change cipro to cefepime 2g IV q 8 hours     1. Infectious Disease:   acyclovir   Oral Tab/Cap 400 milliGRAM(s) Oral every 8 hours  ciprofloxacin     Tablet 500 milliGRAM(s) Oral every 12 hours  clotrimazole Lozenge 1 Lozenge Oral five times a day  voriconazole 200 milliGRAM(s) Oral every 12 hours    2. VOD Prophylaxis: Actigall, Glutamine, Heparin (dosed at 100 units / kg / day)     3. GI Prophylaxis:    pantoprazole    Tablet 40 milliGRAM(s) Oral before breakfast    4. Mouthcare - NS / NaHCO3 rinses, Mycelex, Biotene; Skin care     5. GVHD prophylaxis   Tacrolimus / MMF to start on day + 5   CTX on day + 3, + 4   TBI day -1     6. Transfuse & replete electrolytes prn     7. IV hydration, daily weights, strict I&O, prn diuresis     8. PO intake as tolerated, nutrition follow up as needed, MVI, folic acid     9. Antiemetics, anti-diarrhea medications:   ondansetron Injectable 8 milliGRAM(s) IV Push every 8 hours  metoclopramide Injectable 10 milliGRAM(s) IV Push every 6 hours PRN  aprepitant 80 milliGRAM(s) Oral every 24 hours    10. OOB as tolerated, physical therapy consult if needed     11. Monitor coags / fibrinogen 2x week, vitamin K as needed     12. Monitor closely for clinical changes, monitor for fevers     13. Emotional support provided, plan of care discussed and questions addressed     14. Patient education done regarding plan of care, restrictions and discharge planning     15. Continue regular social work input     I have written the above note for Dr. Gaytan who performed service with me in the room.   Susan Grey NP-C ([phone])    I have seen and examined patient with NP, I agree with above note as scribed. 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    S: Patient seen and examined with HPC Transplant Team:   + fatigue   + headache this am - relieved with tylenol   Denies mouth / tongue / throat pain, dyspnea, cough, nausea, vomiting, diarrhea, abdominal pain     O: Vitals:   Vital Signs Last 24 Hrs  T(C): 36.9 (08 Oct 2019 06:15), Max: 37.3 (07 Oct 2019 17:10)  T(F): 98.4 (08 Oct 2019 06:15), Max: 99.1 (07 Oct 2019 17:10)  HR: 85 (08 Oct 2019 06:15) (68 - 87)  BP: 100/62 (08 Oct 2019 06:15) (93/51 - 120/74)  BP(mean): --  RR: 18 (08 Oct 2019 06:15) (18 - 18)  SpO2: 96% (08 Oct 2019 06:15) (95% - 98%)    Admit weight: 81.5 Kg  Daily Weight in k.9 (07 Oct 2019 09:12)  Today's weight: 79.5kg     Intake / Output:   10-07 @ 07:01  -  10-08 @ 07:00  --------------------------------------------------------  IN: 1990.6 mL / OUT: 2500 mL / NET: -509.4 mL      PE:   Oropharynx: no ulceration or erythema  Oral Mucositis:      none                                                  Grade:   CVS: reg S1 S2  Lungs: CTA  Abdomen: soft, + normoactive BS, NT, ND  Extremities: No C/C/E  Gastric Mucositis:     none                                             Grade:   Intestinal Mucositis:      none                                        Grade:   Skin: no rash  TLC:  C/D/I  Neuro: Alert and oriented x3  Pain: none    Labs:   CBC Full  -  ( 08 Oct 2019 07:28 )  WBC Count : 1.58 K/uL  Hemoglobin : 8.2 g/dL  Hematocrit : 24.6 %  Platelet Count - Automated : 49 K/uL  Mean Cell Volume : 95.0 fl  Mean Cell Hemoglobin : 31.7 pg  Mean Cell Hemoglobin Concentration : 33.3 gm/dL  Auto Neutrophil # : 1.57 K/uL  Auto Lymphocyte # : 0.00 K/uL  Auto Monocyte # : 0.00 K/uL  Auto Eosinophil # : 0.01 K/uL  Auto Basophil # : 0.00 K/uL  Auto Neutrophil % : 98.2 %  Auto Lymphocyte % : 0.0 %  Auto Monocyte % : 0.0 %  Auto Eosinophil % : 0.9 %  Auto Basophil % : 0.0 %                          8.2    1.58  )-----------( 49       ( 08 Oct 2019 07:28 )             24.6     10-08    136  |  103  |  30<H>  ----------------------------<  98  4.7   |  23  |  1.45<H>    Ca    9.4      08 Oct 2019 07:14  Phos  4.5     10-08  Mg     2.1     10-08    TPro  6.3  /  Alb  3.5  /  TBili  0.3  /  DBili  x   /  AST  33  /  ALT  21  /  AlkPhos  65  10-08    PT/INR - ( 07 Oct 2019 07:16 )   PT: 10.9 sec;   INR: 0.95 ratio         PTT - ( 07 Oct 2019 07:16 )  PTT:30.8 sec  LIVER FUNCTIONS - ( 08 Oct 2019 07:14 )  Alb: 3.5 g/dL / Pro: 6.3 g/dL / ALK PHOS: 65 U/L / ALT: 21 U/L / AST: 33 U/L / GGT: x           Lactate Dehydrogenase, Serum: 170 U/L (10-08 @ 07:14)    Meds:   Antimicrobials:   acyclovir   Oral Tab/Cap 400 milliGRAM(s) Oral every 8 hours  ciprofloxacin     Tablet 500 milliGRAM(s) Oral every 12 hours  clotrimazole Lozenge 1 Lozenge Oral five times a day  voriconazole 200 milliGRAM(s) Oral every 12 hours      Heme / Onc:   heparin  Infusion 339 Unit(s)/Hr IV Continuous <Continuous>      GI:  docusate sodium 100 milliGRAM(s) Oral three times a day  pantoprazole    Tablet 40 milliGRAM(s) Oral before breakfast  senna 2 Tablet(s) Oral at bedtime  sodium bicarbonate Mouth Rinse 10 milliLiter(s) Swish and Spit five times a day  ursodiol Capsule 300 milliGRAM(s) Oral two times a day with meals      Cardiovascular:   enalapril 2.5 milliGRAM(s) Oral daily  metoprolol succinate ER 12.5 milliGRAM(s) Oral two times a day  tamsulosin 0.4 milliGRAM(s) Oral at bedtime      Immunologic:   immune   globulin 10% (GAMMAGARD) IVPB 30 Gram(s) IV Intermittent <User Schedule>      Other medications:   acetaminophen   Tablet .. 650 milliGRAM(s) Oral every 6 hours  acetaminophen   Tablet .. 650 milliGRAM(s) Oral <User Schedule>  ALPRAZolam 0.5 milliGRAM(s) Oral at bedtime  aprepitant 80 milliGRAM(s) Oral every 24 hours  Biotene Dry Mouth Oral Rinse 5 milliLiter(s) Swish and Spit five times a day  chlorhexidine 4% Liquid 1 Application(s) Topical <User Schedule>  diphenhydrAMINE   Injectable 25 milliGRAM(s) IV Push <User Schedule>  finasteride 5 milliGRAM(s) Oral daily  folic acid 1 milliGRAM(s) Oral daily  multivitamin 1 Tablet(s) Oral daily  ondansetron Injectable 8 milliGRAM(s) IV Push every 8 hours  sertraline 50 milliGRAM(s) Oral at bedtime  sodium chloride 0.45% 1000 milliLiter(s) IV Continuous <Continuous>  sodium chloride 0.45%. 1000 milliLiter(s) IV Continuous <Continuous>  sodium chloride 0.9%. 1000 milliLiter(s) IV Continuous <Continuous>      PRN:   acetaminophen   Tablet .. 650 milliGRAM(s) Oral every 6 hours PRN  diphenhydrAMINE   Injectable 25 milliGRAM(s) IV Push every 4 hours PRN  metoclopramide Injectable 10 milliGRAM(s) IV Push every 6 hours PRN  sodium chloride 0.9% lock flush 10 milliLiter(s) IV Push every 1 hour PRN      A/P:  59 year old male with a history of AML  Pre :  Allogeneic PBSCT day - 1  TBI today   Start transplant hydration at 2200  Strict I&O, daily weights, prn diuresis   Start cipro for ANC < 1000; if T >/= 38C, pan cx, CXR & change cipro to cefepime 2g IV q 8 hours     1. Infectious Disease:   acyclovir   Oral Tab/Cap 400 milliGRAM(s) Oral every 8 hours  ciprofloxacin     Tablet 500 milliGRAM(s) Oral every 12 hours  clotrimazole Lozenge 1 Lozenge Oral five times a day  voriconazole 200 milliGRAM(s) Oral every 12 hours    2. VOD Prophylaxis: Actigall, Glutamine, Heparin (dosed at 100 units / kg / day)     3. GI Prophylaxis:    pantoprazole    Tablet 40 milliGRAM(s) Oral before breakfast    4. Mouthcare - NS / NaHCO3 rinses, Mycelex, Biotene; Skin care     5. GVHD prophylaxis   Tacrolimus / MMF to start on day + 5   CTX on day + 3, + 4   TBI day -1     6. Transfuse & replete electrolytes prn     7. IV hydration, daily weights, strict I&O, prn diuresis     8. PO intake as tolerated, nutrition follow up as needed, MVI, folic acid     9. Antiemetics, anti-diarrhea medications:   ondansetron Injectable 8 milliGRAM(s) IV Push every 8 hours  metoclopramide Injectable 10 milliGRAM(s) IV Push every 6 hours PRN  aprepitant 80 milliGRAM(s) Oral every 24 hours    10. OOB as tolerated, physical therapy consult if needed     11. Monitor coags / fibrinogen 2x week, vitamin K as needed     12. Monitor closely for clinical changes, monitor for fevers     13. Emotional support provided, plan of care discussed and questions addressed     14. Patient education done regarding plan of care, restrictions and discharge planning     15. Continue regular social work input     I have written the above note for Dr. Gaytan who performed service with me in the room.   Susan Grey NP-C ([phone])    I have seen and examined patient with NP, I agree with above note as scribed.

## 2019-10-09 LAB
ALBUMIN SERPL ELPH-MCNC: 3.5 G/DL — SIGNIFICANT CHANGE UP (ref 3.3–5)
ALP SERPL-CCNC: 61 U/L — SIGNIFICANT CHANGE UP (ref 40–120)
ALT FLD-CCNC: 21 U/L — SIGNIFICANT CHANGE UP (ref 10–45)
ANION GAP SERPL CALC-SCNC: 11 MMOL/L — SIGNIFICANT CHANGE UP (ref 5–17)
AST SERPL-CCNC: 26 U/L — SIGNIFICANT CHANGE UP (ref 10–40)
BASOPHILS # BLD AUTO: 0 K/UL — SIGNIFICANT CHANGE UP (ref 0–0.2)
BASOPHILS NFR BLD AUTO: 0 % — SIGNIFICANT CHANGE UP (ref 0–2)
BILIRUB DIRECT SERPL-MCNC: <0.1 MG/DL — SIGNIFICANT CHANGE UP (ref 0–0.2)
BILIRUB INDIRECT FLD-MCNC: >0.3 MG/DL — SIGNIFICANT CHANGE UP (ref 0.2–1)
BILIRUB SERPL-MCNC: 0.4 MG/DL — SIGNIFICANT CHANGE UP (ref 0.2–1.2)
BLD GP AB SCN SERPL QL: NEGATIVE — SIGNIFICANT CHANGE UP
BUN SERPL-MCNC: 24 MG/DL — HIGH (ref 7–23)
CALCIUM SERPL-MCNC: 8.7 MG/DL — SIGNIFICANT CHANGE UP (ref 8.4–10.5)
CHLORIDE SERPL-SCNC: 102 MMOL/L — SIGNIFICANT CHANGE UP (ref 96–108)
CO2 SERPL-SCNC: 23 MMOL/L — SIGNIFICANT CHANGE UP (ref 22–31)
CREAT SERPL-MCNC: 1.28 MG/DL — SIGNIFICANT CHANGE UP (ref 0.5–1.3)
EOSINOPHIL # BLD AUTO: 0.03 K/UL — SIGNIFICANT CHANGE UP (ref 0–0.5)
EOSINOPHIL NFR BLD AUTO: 4.3 % — SIGNIFICANT CHANGE UP (ref 0–6)
GIANT PLATELETS BLD QL SMEAR: PRESENT — SIGNIFICANT CHANGE UP
GLUCOSE SERPL-MCNC: 95 MG/DL — SIGNIFICANT CHANGE UP (ref 70–99)
HCT VFR BLD CALC: 23.2 % — LOW (ref 39–50)
HGB BLD-MCNC: 7.8 G/DL — LOW (ref 13–17)
LDH SERPL L TO P-CCNC: 159 U/L — SIGNIFICANT CHANGE UP (ref 50–242)
LYMPHOCYTES # BLD AUTO: 0 % — LOW (ref 13–44)
LYMPHOCYTES # BLD AUTO: 0 K/UL — LOW (ref 1–3.3)
MAGNESIUM SERPL-MCNC: 1.9 MG/DL — SIGNIFICANT CHANGE UP (ref 1.6–2.6)
MANUAL SMEAR VERIFICATION: SIGNIFICANT CHANGE UP
MCHC RBC-ENTMCNC: 31.8 PG — SIGNIFICANT CHANGE UP (ref 27–34)
MCHC RBC-ENTMCNC: 33.6 GM/DL — SIGNIFICANT CHANGE UP (ref 32–36)
MCV RBC AUTO: 94.7 FL — SIGNIFICANT CHANGE UP (ref 80–100)
MONOCYTES # BLD AUTO: 0 K/UL — SIGNIFICANT CHANGE UP (ref 0–0.9)
MONOCYTES NFR BLD AUTO: 0 % — LOW (ref 2–14)
NEUTROPHILS # BLD AUTO: 0.64 K/UL — LOW (ref 1.8–7.4)
NEUTROPHILS NFR BLD AUTO: 95.7 % — HIGH (ref 43–77)
PHOSPHATE SERPL-MCNC: 4.2 MG/DL — SIGNIFICANT CHANGE UP (ref 2.5–4.5)
PLAT MORPH BLD: NORMAL — SIGNIFICANT CHANGE UP
PLATELET # BLD AUTO: 41 K/UL — LOW (ref 150–400)
POTASSIUM SERPL-MCNC: 4.6 MMOL/L — SIGNIFICANT CHANGE UP (ref 3.5–5.3)
POTASSIUM SERPL-SCNC: 4.6 MMOL/L — SIGNIFICANT CHANGE UP (ref 3.5–5.3)
PROT SERPL-MCNC: 6.1 G/DL — SIGNIFICANT CHANGE UP (ref 6–8.3)
RBC # BLD: 2.45 M/UL — LOW (ref 4.2–5.8)
RBC # FLD: 16.5 % — HIGH (ref 10.3–14.5)
RBC BLD AUTO: NORMAL — SIGNIFICANT CHANGE UP
RH IG SCN BLD-IMP: POSITIVE — SIGNIFICANT CHANGE UP
SODIUM SERPL-SCNC: 136 MMOL/L — SIGNIFICANT CHANGE UP (ref 135–145)
WBC # BLD: 0.67 K/UL — CRITICAL LOW (ref 3.8–10.5)
WBC # FLD AUTO: 0.67 K/UL — CRITICAL LOW (ref 3.8–10.5)

## 2019-10-09 PROCEDURE — 38240 TRANSPLT ALLO HCT/DONOR: CPT

## 2019-10-09 PROCEDURE — 99232 SBSQ HOSP IP/OBS MODERATE 35: CPT

## 2019-10-09 RX ADMIN — Medication 1 LOZENGE: at 12:53

## 2019-10-09 RX ADMIN — Medication 400 MILLIGRAM(S): at 21:24

## 2019-10-09 RX ADMIN — URSODIOL 300 MILLIGRAM(S): 250 TABLET, FILM COATED ORAL at 08:18

## 2019-10-09 RX ADMIN — HEPARIN SODIUM 3.39 UNIT(S)/HR: 5000 INJECTION INTRAVENOUS; SUBCUTANEOUS at 00:03

## 2019-10-09 RX ADMIN — Medication 500 MILLIGRAM(S): at 17:18

## 2019-10-09 RX ADMIN — CHLORHEXIDINE GLUCONATE 1 APPLICATION(S): 213 SOLUTION TOPICAL at 12:56

## 2019-10-09 RX ADMIN — Medication 10 MILLILITER(S): at 00:02

## 2019-10-09 RX ADMIN — ONDANSETRON 8 MILLIGRAM(S): 8 TABLET, FILM COATED ORAL at 10:00

## 2019-10-09 RX ADMIN — SERTRALINE 50 MILLIGRAM(S): 25 TABLET, FILM COATED ORAL at 21:26

## 2019-10-09 RX ADMIN — Medication 1 MILLIGRAM(S): at 12:53

## 2019-10-09 RX ADMIN — Medication 12.5 MILLIGRAM(S): at 17:18

## 2019-10-09 RX ADMIN — VORICONAZOLE 200 MILLIGRAM(S): 10 INJECTION, POWDER, LYOPHILIZED, FOR SOLUTION INTRAVENOUS at 17:19

## 2019-10-09 RX ADMIN — Medication 1 LOZENGE: at 16:08

## 2019-10-09 RX ADMIN — Medication 10 MILLILITER(S): at 16:07

## 2019-10-09 RX ADMIN — Medication 1 LOZENGE: at 00:02

## 2019-10-09 RX ADMIN — HEPARIN SODIUM 3.39 UNIT(S)/HR: 5000 INJECTION INTRAVENOUS; SUBCUTANEOUS at 21:44

## 2019-10-09 RX ADMIN — SODIUM CHLORIDE 20 MILLILITER(S): 9 INJECTION INTRAMUSCULAR; INTRAVENOUS; SUBCUTANEOUS at 21:44

## 2019-10-09 RX ADMIN — ONDANSETRON 8 MILLIGRAM(S): 8 TABLET, FILM COATED ORAL at 17:18

## 2019-10-09 RX ADMIN — Medication 5 MILLILITER(S): at 12:53

## 2019-10-09 RX ADMIN — URSODIOL 300 MILLIGRAM(S): 250 TABLET, FILM COATED ORAL at 17:18

## 2019-10-09 RX ADMIN — Medication 400 MILLIGRAM(S): at 05:58

## 2019-10-09 RX ADMIN — Medication 1 LOZENGE: at 21:23

## 2019-10-09 RX ADMIN — Medication 500 MILLIGRAM(S): at 05:57

## 2019-10-09 RX ADMIN — VORICONAZOLE 200 MILLIGRAM(S): 10 INJECTION, POWDER, LYOPHILIZED, FOR SOLUTION INTRAVENOUS at 05:58

## 2019-10-09 RX ADMIN — Medication 50 MILLIGRAM(S): at 10:54

## 2019-10-09 RX ADMIN — Medication 0.5 MILLIGRAM(S): at 21:43

## 2019-10-09 RX ADMIN — Medication 5 MILLILITER(S): at 16:07

## 2019-10-09 RX ADMIN — Medication 1 TABLET(S): at 12:53

## 2019-10-09 RX ADMIN — Medication 5 MILLILITER(S): at 21:21

## 2019-10-09 RX ADMIN — Medication 4860 MICROGRAM(S): at 16:07

## 2019-10-09 RX ADMIN — ONDANSETRON 8 MILLIGRAM(S): 8 TABLET, FILM COATED ORAL at 01:27

## 2019-10-09 RX ADMIN — PANTOPRAZOLE SODIUM 40 MILLIGRAM(S): 20 TABLET, DELAYED RELEASE ORAL at 06:01

## 2019-10-09 RX ADMIN — Medication 5 MILLILITER(S): at 00:02

## 2019-10-09 RX ADMIN — Medication 100 MILLIGRAM(S): at 21:24

## 2019-10-09 RX ADMIN — Medication 10 MILLILITER(S): at 12:53

## 2019-10-09 RX ADMIN — Medication 10 MILLILITER(S): at 21:21

## 2019-10-09 RX ADMIN — APREPITANT 80 MILLIGRAM(S): 80 CAPSULE ORAL at 05:58

## 2019-10-09 RX ADMIN — FINASTERIDE 5 MILLIGRAM(S): 5 TABLET, FILM COATED ORAL at 12:53

## 2019-10-09 RX ADMIN — TAMSULOSIN HYDROCHLORIDE 0.4 MILLIGRAM(S): 0.4 CAPSULE ORAL at 21:26

## 2019-10-09 RX ADMIN — Medication 5 MILLILITER(S): at 08:18

## 2019-10-09 RX ADMIN — Medication 1 LOZENGE: at 08:18

## 2019-10-09 RX ADMIN — Medication 400 MILLIGRAM(S): at 15:18

## 2019-10-09 RX ADMIN — Medication 650 MILLIGRAM(S): at 10:54

## 2019-10-09 RX ADMIN — SENNA PLUS 2 TABLET(S): 8.6 TABLET ORAL at 21:24

## 2019-10-09 RX ADMIN — SODIUM CHLORIDE 150 MILLILITER(S): 9 INJECTION, SOLUTION INTRAVENOUS at 21:44

## 2019-10-09 RX ADMIN — Medication 25 MILLIGRAM(S): at 10:55

## 2019-10-09 RX ADMIN — Medication 10 MILLILITER(S): at 08:19

## 2019-10-09 NOTE — CHART NOTE - NSCHARTNOTEFT_GEN_A_CORE
Following pre medications, pt received 250 ml of allogeneic, fresh, mobilized HPC apheresis over 30-60 minutes.     Total MNCs = 5.17  CD 34 cells = 7.34  CD 3 Cells = 19.81  Cell viability = 100   Pt tolerated infusion without any adverse reaction or complications. Pt denies SOB, Chest pain, dizziness and headache. Following pre medications, pt received 250 ml of allogeneic, related, haplo, fresh, mobilized HPC apheresis over 30-60 minutes.   Cell counts as follows:     Total MNCs (x10^8/kg) = 5.17  CD 34 cells (x10^6/kg) = 7.34  CD 3 Cells (x 10^7/kg) = 19.81  Cell viability  (%) = 100     Pt tolerated infusion without any adverse reaction or complications. Pt denies SOB, Chest pain, dizziness and headache.    .

## 2019-10-09 NOTE — PROGRESS NOTE ADULT - ASSESSMENT
Mr. Delong is a 59 year old male, with previously diagnosed acute myeloid leukemia. s/p induction chemotherapy with Daunorubicin/Cytarabine and HIDAC consolidation chemotherapy, now admitted for Haplo-identical stem cell transplant from son:    - No evidence of volume overload at present.  He is on fluids and has been getting IV Lasix prn to maintain net negative.     - Continue to maintain strict I's and O's, and to monitor for signs of volume overload, which he is at risk for.  - No evidence of acute ischemia  - BP is soft this AM.   - Continue ACEI and BB at current doses as tolerated by BP.  He was unable to get both this morning. Attempt to reschedule morning BB. Spoke with nurse  - Continue heparin gtt, and adjust rate per protocol. Monitor platelet count which remains >50  - Plan for stem cell transplant today  - Monitor and replete lytes  - To follow while admitted

## 2019-10-09 NOTE — PROGRESS NOTE ADULT - SUBJECTIVE AND OBJECTIVE BOX
[facility] -- Ede Zamudio, Riki, Edgar, Jose Rabago Savella  Office # [phone]      Follow Up:  CM    Subjective/Observations: Patient seen and examined. Events noted. Resting comfortably in bed. No complaints of chest pain, dyspnea, or palpitations reported. No signs of orthopnea or PND.       REVIEW OF SYSTEMS: All other review of systems is negative unless indicated above    PAST MEDICAL & SURGICAL HISTORY:  COLLEEN (obstructive sleep apnea)  Pulmonary embolism  Deep vein thrombosis (DVT)  H/O cardiomyopathy  Factor 5 Leiden mutation, heterozygous      MEDICATIONS  (STANDING):  acetaminophen   Tablet .. 650 milliGRAM(s) Oral every 6 hours  acetaminophen   Tablet .. 650 milliGRAM(s) Oral once  acetaminophen   Tablet .. 650 milliGRAM(s) Oral <User Schedule>  acyclovir   Oral Tab/Cap 400 milliGRAM(s) Oral every 8 hours  ALPRAZolam 0.5 milliGRAM(s) Oral at bedtime  aprepitant 80 milliGRAM(s) Oral every 24 hours  Biotene Dry Mouth Oral Rinse 5 milliLiter(s) Swish and Spit five times a day  chlorhexidine 4% Liquid 1 Application(s) Topical <User Schedule>  ciprofloxacin     Tablet 500 milliGRAM(s) Oral every 12 hours  clotrimazole Lozenge 1 Lozenge Oral five times a day  diphenhydrAMINE   Injectable 25 milliGRAM(s) IV Push once  diphenhydrAMINE   Injectable 25 milliGRAM(s) IV Push <User Schedule>  docusate sodium 100 milliGRAM(s) Oral three times a day  enalapril 2.5 milliGRAM(s) Oral daily  finasteride 5 milliGRAM(s) Oral daily  folic acid 1 milliGRAM(s) Oral daily  heparin  Infusion 339 Unit(s)/Hr (3.39 mL/Hr) IV Continuous <Continuous>  hydrocortisone sodium succinate Injectable 50 milliGRAM(s) IV Push once  immune   globulin 10% (GAMMAGARD) IVPB 30 Gram(s) IV Intermittent <User Schedule>  metoprolol succinate ER 12.5 milliGRAM(s) Oral two times a day  multivitamin 1 Tablet(s) Oral daily  ondansetron Injectable 8 milliGRAM(s) IV Push every 8 hours  palifermin Injectable  (eMAR) 4860 MICROGram(s) IV Push every 24 hours  pantoprazole    Tablet 40 milliGRAM(s) Oral before breakfast  senna 2 Tablet(s) Oral at bedtime  sertraline 50 milliGRAM(s) Oral at bedtime  sodium bicarbonate Mouth Rinse 10 milliLiter(s) Swish and Spit five times a day  sodium chloride 0.45% 1000 milliLiter(s) (150 mL/Hr) IV Continuous <Continuous>  sodium chloride 0.45%. 1000 milliLiter(s) (200 mL/Hr) IV Continuous <Continuous>  sodium chloride 0.9%. 1000 milliLiter(s) (20 mL/Hr) IV Continuous <Continuous>  tamsulosin 0.4 milliGRAM(s) Oral at bedtime  ursodiol Capsule 300 milliGRAM(s) Oral two times a day with meals  voriconazole 200 milliGRAM(s) Oral every 12 hours    MEDICATIONS  (PRN):  acetaminophen   Tablet .. 650 milliGRAM(s) Oral every 6 hours PRN Temp greater or equal to 38C (100.4F), Mild Pain (1 - 3)  diphenhydrAMINE   Injectable 25 milliGRAM(s) IV Push every 4 hours PRN Allergy symptoms  metoclopramide Injectable 10 milliGRAM(s) IV Push every 6 hours PRN Nausea and/or Vomiting  sodium chloride 0.9% lock flush 10 milliLiter(s) IV Push every 1 hour PRN Pre/post blood products, medications, blood draw, and to maintain line patency      Allergies    No Known Allergies    Intolerances            Vital Signs Last 24 Hrs  T(C): 37 (09 Oct 2019 09:00), Max: 37.2 (09 Oct 2019 06:01)  T(F): 98.6 (09 Oct 2019 09:00), Max: 99 (09 Oct 2019 06:01)  HR: 78 (09 Oct 2019 09:00) (78 - 94)  BP: 103/62 (09 Oct 2019 09:00) (99/64 - 135/81)  BP(mean): --  RR: 19 (09 Oct 2019 09:00) (18 - 19)  SpO2: 96% (09 Oct 2019 09:00) (94% - 98%)    I&O's Summary    08 Oct 2019 07:01  -  09 Oct 2019 07:00  --------------------------------------------------------  IN: 3253 mL / OUT: 2650 mL / NET: 603 mL    09 Oct 2019 07:01  -  09 Oct 2019 09:49  --------------------------------------------------------  IN: 120 mL / OUT: 675 mL / NET: -555 mL          PHYSICAL EXAM:     Constitutional: NAD, awake    HEENT: Moist Mucous Membranes, Anicteric  Pulmonary: Decreased breath sounds b/l. No rales, crackles or wheeze appreciated.   Cardiovascular: Regular, S1 and S2, No murmurs, rubs, gallops or clicks  Gastrointestinal: Bowel Sounds present, soft, nontender.   Lymph: No peripheral edema. No lymphadenopathy.  Skin: No visible rashes or ulcers.  Psych:  Mood & affect appropriate for situation    LABS: All Labs Reviewed:                        7.8    0.67  )-----------( 41       ( 09 Oct 2019 08:18 )             23.2                         8.2    1.58  )-----------( 49       ( 08 Oct 2019 07:28 )             24.6                         8.6    2.65  )-----------( 57       ( 07 Oct 2019 07:07 )             26.2     09 Oct 2019 08:18    136    |  102    |  24     ----------------------------<  95     4.6     |  23     |  1.28   08 Oct 2019 07:14    136    |  103    |  30     ----------------------------<  98     4.7     |  23     |  1.45   07 Oct 2019 07:03    132    |  99     |  25     ----------------------------<  99     4.4     |  25     |  1.40     Ca    8.7        09 Oct 2019 08:18  Ca    9.4        08 Oct 2019 07:14  Ca    9.6        07 Oct 2019 07:03  Phos  4.2       09 Oct 2019 08:18  Phos  4.5       08 Oct 2019 07:14  Phos  4.4       07 Oct 2019 07:03  Mg     1.9       09 Oct 2019 08:18  Mg     2.1       08 Oct 2019 07:14  Mg     2.0       07 Oct 2019 07:03    TPro  6.1    /  Alb  3.5    /  TBili  0.4    /  DBili  <0.1   /  AST  26     /  ALT  21     /  AlkPhos  61     09 Oct 2019 08:18  TPro  6.3    /  Alb  3.5    /  TBili  0.3    /  DBili  x      /  AST  33     /  ALT  21     /  AlkPhos  65     08 Oct 2019 07:14  TPro  6.1    /  Alb  4.1    /  TBili  0.5    /  DBili  0.1    /  AST  28     /  ALT  21     /  AlkPhos  72     07 Oct 2019 07:03

## 2019-10-09 NOTE — PROGRESS NOTE ADULT - SUBJECTIVE AND OBJECTIVE BOX
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    S: Patient seen and examined with HPC Transplant Team:     Denies mouth / tongue / throat pain, dyspnea, cough, nausea, vomiting, diarrhea, abdominal pain     O: Vitals:   Vital Signs Last 24 Hrs  T(C): 37.2 (09 Oct 2019 06:01), Max: 37.2 (09 Oct 2019 06:01)  T(F): 99 (09 Oct 2019 06:01), Max: 99 (09 Oct 2019 06:01)  HR: 85 (09 Oct 2019 06:01) (80 - 94)  BP: 110/65 (09 Oct 2019 06:10) (99/64 - 135/81)  BP(mean): --  RR: 18 (09 Oct 2019 06:01) (18 - 18)  SpO2: 98% (09 Oct 2019 06:01) (94% - 98%)    Admit weight: 81.5 Kg  Daily Weight in k.5 (08 Oct 2019 09:30)    Intake / Output:   10-08 @ 07:01  -  10-09 @ 07:00  --------------------------------------------------------  IN: 3253 mL / OUT: 2650 mL / NET: 603 mL      PE:   Oropharynx: no ulceration or erythema  Oral Mucositis:      none                                                  Grade:   CVS: reg S1 S2  Lungs: CTA  Abdomen: soft, + normoactive BS, NT, ND  Extremities: No C/C/E  Gastric Mucositis:     none                                             Grade:   Intestinal Mucositis:      none                                        Grade:   Skin: no rash  TLC:  C/D/I  Neuro: Alert and oriented x3  Pain: none    Labs:   CBC Full  -  ( 09 Oct 2019 08:18 )  WBC Count : x  Hemoglobin : 7.8 g/dL  Hematocrit : 23.2 %  Platelet Count - Automated : 41 K/uL  Mean Cell Volume : 94.7 fl  Mean Cell Hemoglobin : 31.8 pg  Mean Cell Hemoglobin Concentration : 33.6 gm/dL  Auto Neutrophil # : x  Auto Lymphocyte # : x  Auto Monocyte # : x  Auto Eosinophil # : x  Auto Basophil # : x  Auto Neutrophil % : x  Auto Lymphocyte % : x  Auto Monocyte % : x  Auto Eosinophil % : x  Auto Basophil % : x                          7.8    x     )-----------( 41       ( 09 Oct 2019 08:18 )             23.2     10-08    136  |  103  |  30<H>  ----------------------------<  98  4.7   |  23  |  1.45<H>    Ca    9.4      08 Oct 2019 07:14  Phos  4.5     10-08  Mg     2.1     10-08    TPro  6.3  /  Alb  3.5  /  TBili  0.3  /  DBili  x   /  AST  33  /  ALT  21  /  AlkPhos  65  10-08      LIVER FUNCTIONS - ( 08 Oct 2019 07:14 )  Alb: 3.5 g/dL / Pro: 6.3 g/dL / ALK PHOS: 65 U/L / ALT: 21 U/L / AST: 33 U/L / GGT: x             Meds:   Antimicrobials:   acyclovir   Oral Tab/Cap 400 milliGRAM(s) Oral every 8 hours  ciprofloxacin     Tablet 500 milliGRAM(s) Oral every 12 hours  clotrimazole Lozenge 1 Lozenge Oral five times a day  voriconazole 200 milliGRAM(s) Oral every 12 hours      Heme / Onc:   heparin  Infusion 339 Unit(s)/Hr IV Continuous <Continuous>      GI:  docusate sodium 100 milliGRAM(s) Oral three times a day  pantoprazole    Tablet 40 milliGRAM(s) Oral before breakfast  senna 2 Tablet(s) Oral at bedtime  sodium bicarbonate Mouth Rinse 10 milliLiter(s) Swish and Spit five times a day  ursodiol Capsule 300 milliGRAM(s) Oral two times a day with meals      Cardiovascular:   enalapril 2.5 milliGRAM(s) Oral daily  metoprolol succinate ER 12.5 milliGRAM(s) Oral two times a day  tamsulosin 0.4 milliGRAM(s) Oral at bedtime      Immunologic:   immune   globulin 10% (GAMMAGARD) IVPB 30 Gram(s) IV Intermittent <User Schedule>      Other medications:   acetaminophen   Tablet .. 650 milliGRAM(s) Oral every 6 hours  acetaminophen   Tablet .. 650 milliGRAM(s) Oral once  acetaminophen   Tablet .. 650 milliGRAM(s) Oral <User Schedule>  ALPRAZolam 0.5 milliGRAM(s) Oral at bedtime  aprepitant 80 milliGRAM(s) Oral every 24 hours  Biotene Dry Mouth Oral Rinse 5 milliLiter(s) Swish and Spit five times a day  chlorhexidine 4% Liquid 1 Application(s) Topical <User Schedule>  diphenhydrAMINE   Injectable 25 milliGRAM(s) IV Push once  diphenhydrAMINE   Injectable 25 milliGRAM(s) IV Push <User Schedule>  finasteride 5 milliGRAM(s) Oral daily  folic acid 1 milliGRAM(s) Oral daily  hydrocortisone sodium succinate Injectable 50 milliGRAM(s) IV Push once  multivitamin 1 Tablet(s) Oral daily  ondansetron Injectable 8 milliGRAM(s) IV Push every 8 hours  palifermin Injectable  (eMAR) 4860 MICROGram(s) IV Push every 24 hours  sertraline 50 milliGRAM(s) Oral at bedtime  sodium chloride 0.45% 1000 milliLiter(s) IV Continuous <Continuous>  sodium chloride 0.45%. 1000 milliLiter(s) IV Continuous <Continuous>  sodium chloride 0.9%. 1000 milliLiter(s) IV Continuous <Continuous>      PRN:   acetaminophen   Tablet .. 650 milliGRAM(s) Oral every 6 hours PRN  diphenhydrAMINE   Injectable 25 milliGRAM(s) IV Push every 4 hours PRN  metoclopramide Injectable 10 milliGRAM(s) IV Push every 6 hours PRN  sodium chloride 0.9% lock flush 10 milliLiter(s) IV Push every 1 hour PRN    A/P:  59 year old male with a history of AML  Pre :  Allogeneic PBSCT day 0  HPC transplant today - continue transplant hydration for 24 hours post infusion of cells   Strict I&O, daily weights, prn diuresis   Start cipro for ANC < 1000; if T >/= 38C, pan cx, CXR & change cipro to cefepime 2g IV q 8 hours     1. Infectious Disease:   acyclovir   Oral Tab/Cap 400 milliGRAM(s) Oral every 8 hours  ciprofloxacin     Tablet 500 milliGRAM(s) Oral every 12 hours  clotrimazole Lozenge 1 Lozenge Oral five times a day  voriconazole 200 milliGRAM(s) Oral every 12 hours    2. VOD Prophylaxis: Actigall, Glutamine, Heparin (dosed at 100 units / kg / day)     3. GI Prophylaxis:   pantoprazole    Tablet 40 milliGRAM(s) Oral before breakfast    4. Mouthcare - NS / NaHCO3 rinses, Mycelex, Biotene; Skin care     5. GVHD prophylaxis   Tacro, MMF to start on day + 5   CTX days + 3, +4   TBI day -1     6. Transfuse & replete electrolytes prn     7. IV hydration, daily weights, strict I&O, prn diuresis     8. PO intake as tolerated, nutrition follow up as needed, MVI, folic acid     9. Antiemetics, anti-diarrhea medications:   metoclopramide Injectable 10 milliGRAM(s) IV Push every 6 hours PRN  ondansetron Injectable 8 milliGRAM(s) IV Push every 8 hours  aprepitant 80 milliGRAM(s) Oral every 24 hours    10. OOB as tolerated, physical therapy consult if needed     11. Monitor coags / fibrinogen 2x week, vitamin K as needed     12. Monitor closely for clinical changes, monitor for fevers     13. Emotional support provided, plan of care discussed and questions addressed     14. Patient education done regarding plan of care, restrictions and discharge planning     15. Continue regular social work input     I have written the above note for Dr. Gaytan who performed service with me in the room.   Susan Grey NP-C ([phone])    I have seen and examined patient with NP, I agree with above note as scribed. 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    S: Patient seen and examined with HPC Transplant Team:   No acute complaints today    Denies mouth / tongue / throat pain, dyspnea, cough, nausea, vomiting, diarrhea, abdominal pain     O: Vitals:   Vital Signs Last 24 Hrs  T(C): 37.2 (09 Oct 2019 06:01), Max: 37.2 (09 Oct 2019 06:01)  T(F): 99 (09 Oct 2019 06:01), Max: 99 (09 Oct 2019 06:01)  HR: 85 (09 Oct 2019 06:01) (80 - 94)  BP: 110/65 (09 Oct 2019 06:10) (99/64 - 135/81)  BP(mean): --  RR: 18 (09 Oct 2019 06:01) (18 - 18)  SpO2: 98% (09 Oct 2019 06:01) (94% - 98%)    Admit weight: 81.5 Kg  Daily Weight in k.5 (08 Oct 2019 09:30)    Intake / Output:   10-08 @ 07:01  -  10-09 @ 07:00  --------------------------------------------------------  IN: 3253 mL / OUT: 2650 mL / NET: 603 mL      PE:   Oropharynx: no ulceration or erythema  Oral Mucositis:      none                                                  Grade:   CVS: reg S1 S2  Lungs: CTA  Abdomen: soft, + normoactive BS, NT, ND  Extremities: No C/C/E  Gastric Mucositis:     none                                             Grade:   Intestinal Mucositis:      none                                        Grade:   Skin: no rash  TLC:  C/D/I  Neuro: Alert and oriented x3  Pain: none    Labs:   CBC Full  -  ( 09 Oct 2019 08:18 )  WBC Count : x  Hemoglobin : 7.8 g/dL  Hematocrit : 23.2 %  Platelet Count - Automated : 41 K/uL  Mean Cell Volume : 94.7 fl  Mean Cell Hemoglobin : 31.8 pg  Mean Cell Hemoglobin Concentration : 33.6 gm/dL  Auto Neutrophil # : x  Auto Lymphocyte # : x  Auto Monocyte # : x  Auto Eosinophil # : x  Auto Basophil # : x  Auto Neutrophil % : x  Auto Lymphocyte % : x  Auto Monocyte % : x  Auto Eosinophil % : x  Auto Basophil % : x                          7.8    x     )-----------( 41       ( 09 Oct 2019 08:18 )             23.2     10-08    136  |  103  |  30<H>  ----------------------------<  98  4.7   |  23  |  1.45<H>    Ca    9.4      08 Oct 2019 07:14  Phos  4.5     10-08  Mg     2.1     10-08    TPro  6.3  /  Alb  3.5  /  TBili  0.3  /  DBili  x   /  AST  33  /  ALT  21  /  AlkPhos  65  10-08      LIVER FUNCTIONS - ( 08 Oct 2019 07:14 )  Alb: 3.5 g/dL / Pro: 6.3 g/dL / ALK PHOS: 65 U/L / ALT: 21 U/L / AST: 33 U/L / GGT: x             Meds:   Antimicrobials:   acyclovir   Oral Tab/Cap 400 milliGRAM(s) Oral every 8 hours  ciprofloxacin     Tablet 500 milliGRAM(s) Oral every 12 hours  clotrimazole Lozenge 1 Lozenge Oral five times a day  voriconazole 200 milliGRAM(s) Oral every 12 hours      Heme / Onc:   heparin  Infusion 339 Unit(s)/Hr IV Continuous <Continuous>      GI:  docusate sodium 100 milliGRAM(s) Oral three times a day  pantoprazole    Tablet 40 milliGRAM(s) Oral before breakfast  senna 2 Tablet(s) Oral at bedtime  sodium bicarbonate Mouth Rinse 10 milliLiter(s) Swish and Spit five times a day  ursodiol Capsule 300 milliGRAM(s) Oral two times a day with meals      Cardiovascular:   enalapril 2.5 milliGRAM(s) Oral daily  metoprolol succinate ER 12.5 milliGRAM(s) Oral two times a day  tamsulosin 0.4 milliGRAM(s) Oral at bedtime      Immunologic:   immune   globulin 10% (GAMMAGARD) IVPB 30 Gram(s) IV Intermittent <User Schedule>      Other medications:   acetaminophen   Tablet .. 650 milliGRAM(s) Oral every 6 hours  acetaminophen   Tablet .. 650 milliGRAM(s) Oral once  acetaminophen   Tablet .. 650 milliGRAM(s) Oral <User Schedule>  ALPRAZolam 0.5 milliGRAM(s) Oral at bedtime  aprepitant 80 milliGRAM(s) Oral every 24 hours  Biotene Dry Mouth Oral Rinse 5 milliLiter(s) Swish and Spit five times a day  chlorhexidine 4% Liquid 1 Application(s) Topical <User Schedule>  diphenhydrAMINE   Injectable 25 milliGRAM(s) IV Push once  diphenhydrAMINE   Injectable 25 milliGRAM(s) IV Push <User Schedule>  finasteride 5 milliGRAM(s) Oral daily  folic acid 1 milliGRAM(s) Oral daily  hydrocortisone sodium succinate Injectable 50 milliGRAM(s) IV Push once  multivitamin 1 Tablet(s) Oral daily  ondansetron Injectable 8 milliGRAM(s) IV Push every 8 hours  palifermin Injectable  (eMAR) 4860 MICROGram(s) IV Push every 24 hours  sertraline 50 milliGRAM(s) Oral at bedtime  sodium chloride 0.45% 1000 milliLiter(s) IV Continuous <Continuous>  sodium chloride 0.45%. 1000 milliLiter(s) IV Continuous <Continuous>  sodium chloride 0.9%. 1000 milliLiter(s) IV Continuous <Continuous>      PRN:   acetaminophen   Tablet .. 650 milliGRAM(s) Oral every 6 hours PRN  diphenhydrAMINE   Injectable 25 milliGRAM(s) IV Push every 4 hours PRN  metoclopramide Injectable 10 milliGRAM(s) IV Push every 6 hours PRN  sodium chloride 0.9% lock flush 10 milliLiter(s) IV Push every 1 hour PRN    A/P:  59 year old male with a history of AML  Pre :  Allogeneic PBSCT day 0  HPC transplant today - continue transplant hydration for 24 hours post infusion of cells   Strict I&O, daily weights, prn diuresis   Start cipro for ANC < 1000; if T >/= 38C, pan cx, CXR & change cipro to cefepime 2g IV q 8 hours     1. Infectious Disease:   acyclovir   Oral Tab/Cap 400 milliGRAM(s) Oral every 8 hours  ciprofloxacin     Tablet 500 milliGRAM(s) Oral every 12 hours  clotrimazole Lozenge 1 Lozenge Oral five times a day  voriconazole 200 milliGRAM(s) Oral every 12 hours    2. VOD Prophylaxis: Actigall, Glutamine, Heparin (dosed at 100 units / kg / day)     3. GI Prophylaxis:   pantoprazole    Tablet 40 milliGRAM(s) Oral before breakfast    4. Mouthcare - NS / NaHCO3 rinses, Mycelex, Biotene; Skin care     5. GVHD prophylaxis   Tacro, MMF to start on day + 5   CTX days + 3, +4   TBI day -1     6. Transfuse & replete electrolytes prn     7. IV hydration, daily weights, strict I&O, prn diuresis     8. PO intake as tolerated, nutrition follow up as needed, MVI, folic acid     9. Antiemetics, anti-diarrhea medications:   metoclopramide Injectable 10 milliGRAM(s) IV Push every 6 hours PRN  ondansetron Injectable 8 milliGRAM(s) IV Push every 8 hours  aprepitant 80 milliGRAM(s) Oral every 24 hours    10. OOB as tolerated, physical therapy consult if needed     11. Monitor coags / fibrinogen 2x week, vitamin K as needed     12. Monitor closely for clinical changes, monitor for fevers     13. Emotional support provided, plan of care discussed and questions addressed     14. Patient education done regarding plan of care, restrictions and discharge planning     15. Continue regular social work input     I have written the above note for Dr. Gaytan who performed service with me in the room.   Susan Grey NP-C ([phone])    I have seen and examined patient with NP, I agree with above note as scribed. normal (ped)...

## 2019-10-09 NOTE — PROGRESS NOTE ADULT - ATTENDING COMMENTS
59 year old male, with previously diagnosed acute myeloid leukemia. s/p induction chemotherapy with Daunorubicin/Cytarabine and  HIDAC consolidation chemotherapy, now admitted for Haplo-identical stem cell transplant from son. Conditioning regimen of Fludarabine, Cytoxan, and TBI. Other history includes non-ischemic cardiomyopathy (recent EF 25% 9/19/19), COLLEEN, DVT/PE, HTN, Factor V Leiden and amputation of right toe due to osteomyelitis.  Day 0 - HPC transplant today. Continue transplant hydation for 24 hours post infusion of cells   Strict I&O, daily weights, prn diuresis   Renal insufficiency- monitor daily creatinine  Will also get IVIG 0.4 g/kg (ideal body weight)  for GVHD ppx. Premedication with Tylenol and benadryl  TBI on day -1   Start Zarxio on day +5,  MMF and Tacrolimus. Check Tacrolimus levels on Monday and Thursday.   Anxiolytics, antinausea, antidiarrhea medications as needed   Lasix PRN while being aggressively hydrated to avoid VOD   Nutritional support, pain management.  hx non-ischemic CM, followed by Dr. Luke Lyn, last Echo 9/19 (EF 25%)  VOD prophylaxis - low dose heparin gtt (dosed at 100 units / kg / day), glutamine supplementation, Actigall BID   ID ppx - Bactrim DS through day -2 , Acyclovir 400mg po TID to start day -1; continue Voriconazole for h/o fungal infection  GI prophylaxis - Protonix po QD.  Kepivance for prevention of mucositis- days 0, +1, +2  Aggressive mouth care and skin care as per protocol. 59 year old male, with previously diagnosed acute myeloid leukemia. s/p induction chemotherapy with Daunorubicin/Cytarabine and  HIDAC consolidation chemotherapy, now admitted for Haplo-identical stem cell transplant from son. Conditioning regimen of Fludarabine, Cytoxan, and TBI. Other history includes non-ischemic cardiomyopathy (recent EF 25% 9/19/19), COLLEEN, DVT/PE, HTN, Factor V Leiden and amputation of right toe due to osteomyelitis.  Day 0 - HPC transplant today. Continue transplant hydation for 24 hours post infusion of cells   Strict I&O, daily weights, prn diuresis   Renal insufficiency- monitor daily creatinine  Will also get IVIG 0.4 g/kg (ideal body weight)  for GVHD ppx. Premedication with Tylenol and benadryl  TBI on day -1   Start Zarxio on day +5,  MMF and Tacrolimus. Check Tacrolimus levels on Monday and Thursday.   Anxiolytics, antinausea, antidiarrhea medications as needed   Lasix PRN while being aggressively hydrated to avoid VOD.  Nutritional support, pain management.  hx non-ischemic CM, followed by Dr. Luke Lyn, last Echo 9/19 (EF 25%)  VOD prophylaxis - low dose heparin gtt (dosed at 100 units / kg / day), glutamine supplementation, Actigall BID   ID ppx - Bactrim DS through day -2 , Acyclovir 400mg po TID to start day -1; continue Voriconazole for h/o fungal infection  GI prophylaxis - Protonix po QD.  Kepivance for prevention of mucositis- days 0, +1, +2  Aggressive mouth care and skin care as per protocol.

## 2019-10-10 LAB
ALBUMIN SERPL ELPH-MCNC: 3.4 G/DL — SIGNIFICANT CHANGE UP (ref 3.3–5)
ALP SERPL-CCNC: 58 U/L — SIGNIFICANT CHANGE UP (ref 40–120)
ALT FLD-CCNC: 18 U/L — SIGNIFICANT CHANGE UP (ref 10–45)
ANION GAP SERPL CALC-SCNC: 9 MMOL/L — SIGNIFICANT CHANGE UP (ref 5–17)
ANISOCYTOSIS BLD QL: SLIGHT — SIGNIFICANT CHANGE UP
APPEARANCE UR: CLEAR — SIGNIFICANT CHANGE UP
APTT BLD: 30.8 SEC — SIGNIFICANT CHANGE UP (ref 27.5–36.3)
AST SERPL-CCNC: 21 U/L — SIGNIFICANT CHANGE UP (ref 10–40)
BASOPHILS # BLD AUTO: 0 K/UL — SIGNIFICANT CHANGE UP (ref 0–0.2)
BASOPHILS NFR BLD AUTO: 0 % — SIGNIFICANT CHANGE UP (ref 0–2)
BILIRUB SERPL-MCNC: 0.4 MG/DL — SIGNIFICANT CHANGE UP (ref 0.2–1.2)
BILIRUB UR-MCNC: NEGATIVE — SIGNIFICANT CHANGE UP
BUN SERPL-MCNC: 24 MG/DL — HIGH (ref 7–23)
CALCIUM SERPL-MCNC: 8.2 MG/DL — LOW (ref 8.4–10.5)
CHLORIDE SERPL-SCNC: 104 MMOL/L — SIGNIFICANT CHANGE UP (ref 96–108)
CO2 SERPL-SCNC: 27 MMOL/L — SIGNIFICANT CHANGE UP (ref 22–31)
COLOR SPEC: YELLOW — SIGNIFICANT CHANGE UP
CREAT SERPL-MCNC: 1.12 MG/DL — SIGNIFICANT CHANGE UP (ref 0.5–1.3)
DACRYOCYTES BLD QL SMEAR: SLIGHT — SIGNIFICANT CHANGE UP
DIFF PNL FLD: NEGATIVE — SIGNIFICANT CHANGE UP
EOSINOPHIL # BLD AUTO: 0 K/UL — SIGNIFICANT CHANGE UP (ref 0–0.5)
EOSINOPHIL NFR BLD AUTO: 0 % — SIGNIFICANT CHANGE UP (ref 0–6)
FIBRINOGEN PPP-MCNC: 412 MG/DL — SIGNIFICANT CHANGE UP (ref 350–510)
GLUCOSE SERPL-MCNC: 95 MG/DL — SIGNIFICANT CHANGE UP (ref 70–99)
GLUCOSE UR QL: NEGATIVE — SIGNIFICANT CHANGE UP
HCT VFR BLD CALC: 23 % — LOW (ref 39–50)
HGB BLD-MCNC: 7.8 G/DL — LOW (ref 13–17)
HYPOCHROMIA BLD QL: SLIGHT — SIGNIFICANT CHANGE UP
INR BLD: 0.97 RATIO — SIGNIFICANT CHANGE UP (ref 0.88–1.16)
KETONES UR-MCNC: NEGATIVE — SIGNIFICANT CHANGE UP
LDH SERPL L TO P-CCNC: 189 U/L — SIGNIFICANT CHANGE UP (ref 50–242)
LEUKOCYTE ESTERASE UR-ACNC: NEGATIVE — SIGNIFICANT CHANGE UP
LG PLATELETS BLD QL AUTO: SLIGHT — SIGNIFICANT CHANGE UP
LYMPHOCYTES # BLD AUTO: 0.06 K/UL — LOW (ref 1–3.3)
LYMPHOCYTES # BLD AUTO: 8 % — LOW (ref 13–44)
MAGNESIUM SERPL-MCNC: 1.7 MG/DL — SIGNIFICANT CHANGE UP (ref 1.6–2.6)
MANUAL SMEAR VERIFICATION: SIGNIFICANT CHANGE UP
MCHC RBC-ENTMCNC: 32.1 PG — SIGNIFICANT CHANGE UP (ref 27–34)
MCHC RBC-ENTMCNC: 33.9 GM/DL — SIGNIFICANT CHANGE UP (ref 32–36)
MCV RBC AUTO: 94.7 FL — SIGNIFICANT CHANGE UP (ref 80–100)
MONOCYTES # BLD AUTO: 0 K/UL — SIGNIFICANT CHANGE UP (ref 0–0.9)
MONOCYTES NFR BLD AUTO: 0 % — LOW (ref 2–14)
MYELOCYTES NFR BLD: 4 % — HIGH (ref 0–0)
NEUTROPHILS # BLD AUTO: 0.65 K/UL — LOW (ref 1.8–7.4)
NEUTROPHILS NFR BLD AUTO: 84 % — HIGH (ref 43–77)
NEUTS BAND # BLD: 4 % — SIGNIFICANT CHANGE UP (ref 0–8)
NITRITE UR-MCNC: NEGATIVE — SIGNIFICANT CHANGE UP
NRBC # BLD: 0 /100 — SIGNIFICANT CHANGE UP (ref 0–0)
PH UR: 6.5 — SIGNIFICANT CHANGE UP (ref 5–8)
PHOSPHATE SERPL-MCNC: 3.4 MG/DL — SIGNIFICANT CHANGE UP (ref 2.5–4.5)
PLAT MORPH BLD: NORMAL — SIGNIFICANT CHANGE UP
PLATELET # BLD AUTO: 47 K/UL — LOW (ref 150–400)
POIKILOCYTOSIS BLD QL AUTO: SLIGHT — SIGNIFICANT CHANGE UP
POTASSIUM SERPL-MCNC: 3.6 MMOL/L — SIGNIFICANT CHANGE UP (ref 3.5–5.3)
POTASSIUM SERPL-SCNC: 3.6 MMOL/L — SIGNIFICANT CHANGE UP (ref 3.5–5.3)
PROT SERPL-MCNC: 5.9 G/DL — LOW (ref 6–8.3)
PROT UR-MCNC: SIGNIFICANT CHANGE UP
PROTHROM AB SERPL-ACNC: 11.1 SEC — SIGNIFICANT CHANGE UP (ref 10–12.9)
RBC # BLD: 2.43 M/UL — LOW (ref 4.2–5.8)
RBC # FLD: 16.2 % — HIGH (ref 10.3–14.5)
RBC BLD AUTO: ABNORMAL
SODIUM SERPL-SCNC: 140 MMOL/L — SIGNIFICANT CHANGE UP (ref 135–145)
SP GR SPEC: 1.02 — SIGNIFICANT CHANGE UP (ref 1.01–1.02)
UROBILINOGEN FLD QL: SIGNIFICANT CHANGE UP
WBC # BLD: 0.74 K/UL — CRITICAL LOW (ref 3.8–10.5)
WBC # FLD AUTO: 0.74 K/UL — CRITICAL LOW (ref 3.8–10.5)

## 2019-10-10 PROCEDURE — 71045 X-RAY EXAM CHEST 1 VIEW: CPT | Mod: 26

## 2019-10-10 PROCEDURE — 99291 CRITICAL CARE FIRST HOUR: CPT

## 2019-10-10 PROCEDURE — 99232 SBSQ HOSP IP/OBS MODERATE 35: CPT

## 2019-10-10 RX ORDER — CEFEPIME 1 G/1
2000 INJECTION, POWDER, FOR SOLUTION INTRAMUSCULAR; INTRAVENOUS EVERY 8 HOURS
Refills: 0 | Status: DISCONTINUED | OUTPATIENT
Start: 2019-10-10 | End: 2019-10-28

## 2019-10-10 RX ADMIN — PANTOPRAZOLE SODIUM 40 MILLIGRAM(S): 20 TABLET, DELAYED RELEASE ORAL at 05:51

## 2019-10-10 RX ADMIN — Medication 1 LOZENGE: at 00:20

## 2019-10-10 RX ADMIN — Medication 5 MILLILITER(S): at 19:59

## 2019-10-10 RX ADMIN — ONDANSETRON 8 MILLIGRAM(S): 8 TABLET, FILM COATED ORAL at 09:56

## 2019-10-10 RX ADMIN — Medication 650 MILLIGRAM(S): at 19:33

## 2019-10-10 RX ADMIN — Medication 100 MILLIGRAM(S): at 05:51

## 2019-10-10 RX ADMIN — FINASTERIDE 5 MILLIGRAM(S): 5 TABLET, FILM COATED ORAL at 13:03

## 2019-10-10 RX ADMIN — SODIUM CHLORIDE 20 MILLILITER(S): 9 INJECTION INTRAMUSCULAR; INTRAVENOUS; SUBCUTANEOUS at 21:17

## 2019-10-10 RX ADMIN — Medication 650 MILLIGRAM(S): at 13:30

## 2019-10-10 RX ADMIN — Medication 0.5 MILLIGRAM(S): at 21:20

## 2019-10-10 RX ADMIN — Medication 1 LOZENGE: at 13:03

## 2019-10-10 RX ADMIN — Medication 5 MILLILITER(S): at 17:18

## 2019-10-10 RX ADMIN — Medication 1 LOZENGE: at 08:35

## 2019-10-10 RX ADMIN — CEFEPIME 100 MILLIGRAM(S): 1 INJECTION, POWDER, FOR SOLUTION INTRAMUSCULAR; INTRAVENOUS at 14:00

## 2019-10-10 RX ADMIN — VORICONAZOLE 200 MILLIGRAM(S): 10 INJECTION, POWDER, LYOPHILIZED, FOR SOLUTION INTRAVENOUS at 17:23

## 2019-10-10 RX ADMIN — ONDANSETRON 8 MILLIGRAM(S): 8 TABLET, FILM COATED ORAL at 01:06

## 2019-10-10 RX ADMIN — Medication 5 MILLILITER(S): at 13:03

## 2019-10-10 RX ADMIN — HEPARIN SODIUM 3.39 UNIT(S)/HR: 5000 INJECTION INTRAVENOUS; SUBCUTANEOUS at 21:23

## 2019-10-10 RX ADMIN — Medication 1 LOZENGE: at 17:19

## 2019-10-10 RX ADMIN — Medication 1 LOZENGE: at 20:00

## 2019-10-10 RX ADMIN — Medication 10 MILLILITER(S): at 08:35

## 2019-10-10 RX ADMIN — URSODIOL 300 MILLIGRAM(S): 250 TABLET, FILM COATED ORAL at 08:35

## 2019-10-10 RX ADMIN — Medication 400 MILLIGRAM(S): at 05:51

## 2019-10-10 RX ADMIN — Medication 650 MILLIGRAM(S): at 19:03

## 2019-10-10 RX ADMIN — Medication 650 MILLIGRAM(S): at 13:24

## 2019-10-10 RX ADMIN — Medication 10 MILLILITER(S): at 20:00

## 2019-10-10 RX ADMIN — Medication 10 MILLILITER(S): at 00:20

## 2019-10-10 RX ADMIN — Medication 400 MILLIGRAM(S): at 21:21

## 2019-10-10 RX ADMIN — TAMSULOSIN HYDROCHLORIDE 0.4 MILLIGRAM(S): 0.4 CAPSULE ORAL at 21:22

## 2019-10-10 RX ADMIN — VORICONAZOLE 200 MILLIGRAM(S): 10 INJECTION, POWDER, LYOPHILIZED, FOR SOLUTION INTRAVENOUS at 05:50

## 2019-10-10 RX ADMIN — Medication 5 MILLILITER(S): at 08:35

## 2019-10-10 RX ADMIN — Medication 10 MILLILITER(S): at 17:19

## 2019-10-10 RX ADMIN — Medication 500 MILLIGRAM(S): at 05:50

## 2019-10-10 RX ADMIN — Medication 1 TABLET(S): at 13:03

## 2019-10-10 RX ADMIN — URSODIOL 300 MILLIGRAM(S): 250 TABLET, FILM COATED ORAL at 17:20

## 2019-10-10 RX ADMIN — ONDANSETRON 8 MILLIGRAM(S): 8 TABLET, FILM COATED ORAL at 17:21

## 2019-10-10 RX ADMIN — Medication 400 MILLIGRAM(S): at 13:03

## 2019-10-10 RX ADMIN — SERTRALINE 50 MILLIGRAM(S): 25 TABLET, FILM COATED ORAL at 21:22

## 2019-10-10 RX ADMIN — CHLORHEXIDINE GLUCONATE 1 APPLICATION(S): 213 SOLUTION TOPICAL at 08:36

## 2019-10-10 RX ADMIN — Medication 5 MILLILITER(S): at 00:20

## 2019-10-10 RX ADMIN — APREPITANT 80 MILLIGRAM(S): 80 CAPSULE ORAL at 05:51

## 2019-10-10 RX ADMIN — CEFEPIME 100 MILLIGRAM(S): 1 INJECTION, POWDER, FOR SOLUTION INTRAMUSCULAR; INTRAVENOUS at 21:17

## 2019-10-10 RX ADMIN — Medication 1 MILLIGRAM(S): at 13:03

## 2019-10-10 RX ADMIN — Medication 10 MILLILITER(S): at 13:03

## 2019-10-10 RX ADMIN — Medication 4860 MICROGRAM(S): at 16:34

## 2019-10-10 NOTE — PROVIDER CONTACT NOTE (CRITICAL VALUE NOTIFICATION) - RECOMMENDATIONS
notify provider, pt.was seen and examined by provider earlier and ordered CT of the head without contrast,  is till waiting for transport.

## 2019-10-10 NOTE — PROGRESS NOTE ADULT - ATTENDING COMMENTS
59 year old male, with previously diagnosed acute myeloid leukemia. s/p induction chemotherapy with Daunorubicin/Cytarabine and  HIDAC consolidation chemotherapy, now admitted for Haplo-identical stem cell transplant from son. Conditioning regimen of Fludarabine, Cytoxan, and TBI. Other history includes non-ischemic cardiomyopathy (recent EF 25% 9/19/19), COLLEEN, DVT/PE, HTN, Factor V Leiden and amputation of right toe due to osteomyelitis.  Day 0 - HPC transplant today. Continue transplant hydation for 24 hours post infusion of cells   Strict I&O, daily weights, prn diuresis   Renal insufficiency- monitor daily creatinine  Will also get IVIG 0.4 g/kg (ideal body weight)  for GVHD ppx. Premedication with Tylenol and benadryl  TBI on day -1   Start Zarxio on day +5,  MMF and Tacrolimus. Check Tacrolimus levels on Monday and Thursday.   Anxiolytics, antinausea, antidiarrhea medications as needed   Lasix PRN while being aggressively hydrated to avoid VOD.  Nutritional support, pain management.  hx non-ischemic CM, followed by Dr. Luke Lyn, last Echo 9/19 (EF 25%)  VOD prophylaxis - low dose heparin gtt (dosed at 100 units / kg / day), glutamine supplementation, Actigall BID   ID ppx - Bactrim DS through day -2 , Acyclovir 400mg po TID to start day -1; continue Voriconazole for h/o fungal infection  GI prophylaxis - Protonix po QD.  Kepivance for prevention of mucositis- days 0, +1, +2  Aggressive mouth care and skin care as per protocol. 59 year old male, with previously diagnosed acute myeloid leukemia. s/p induction chemotherapy with Daunorubicin/Cytarabine and  HIDAC consolidation chemotherapy, now admitted for Haplo-identical stem cell transplant from son. Conditioning regimen of Fludarabine, Cytoxan, and TBI. Other history includes non-ischemic cardiomyopathy (recent EF 25% 9/19/19), COLLEEN, DVT/PE, HTN, Factor V Leiden and amputation of right toe due to osteomyelitis.  Day + 1  Strict I&O, daily weights, prn diuresis   Renal insufficiency- monitor daily creatinine  Will also get IVIG 0.4 g/kg (ideal body weight)  for GVHD ppx. Premedication with Tylenol and benadryl  TBI on day -1   Start Zarxio on day +5,  MMF and Tacrolimus. Check Tacrolimus levels on Monday and Thursday.   Anxiolytics, antinausea, antidiarrhea medications as needed   Lasix PRN while being aggressively hydrated to avoid VOD.  Nutritional support, pain management.  hx non-ischemic CM, followed by Dr. Luke Lyn, last Echo 9/19 (EF 25%)  VOD prophylaxis - low dose heparin gtt (dosed at 100 units / kg / day), glutamine supplementation, Actigall BID   ID ppx - Bactrim DS through day -2 , Acyclovir 400mg po TID to start day -1; continue Voriconazole for h/o fungal infection  GI prophylaxis - Protonix po QD.  Kepivance for prevention of mucositis- days 0, +1, +2  Aggressive mouth care and skin care as per protocol. 59 year old male, with previously diagnosed acute myeloid leukemia. s/p induction chemotherapy with Daunorubicin/Cytarabine and  HIDAC consolidation chemotherapy, now admitted for Haplo-identical stem cell transplant from son. Conditioning regimen of Fludarabine, Cytoxan, and TBI. Other history includes non-ischemic cardiomyopathy (recent EF 25% 9/19/19), COLLEEN, DVT/PE, HTN, Factor V Leiden and amputation of right toe due to osteomyelitis.  Day + 1  Strict I&O, daily weights, prn diuresis   Renal insufficiency- monitor daily creatinine  IVIG 0.4 g/kg (ideal body weight)  for GVHD ppx. Premedication with Tylenol and benadryl  TBI on day -1   Start Zarxio on day +5,  MMF and Tacrolimus. Check Tacrolimus levels on Monday and Thursday.   Anxiolytics, antinausea, antidiarrhea medications as needed   Lasix PRN while being aggressively hydrated to avoid VOD.  Nutritional support, pain management.  hx non-ischemic CM, followed by Dr. Luke Lyn, last Echo 9/19 (EF 25%)  VOD prophylaxis - low dose heparin gtt (dosed at 100 units / kg / day), glutamine supplementation, Actigall BID   ID ppx - Bactrim DS through day -2 , Acyclovir 400mg po TID to start day -1; continue Voriconazole for h/o fungal infection  GI prophylaxis - Protonix po QD.  Kepivance for prevention of mucositis- days 0, +1, +2  transfusion / electrolyte support  Aggressive mouth care and skin care as per protocol.

## 2019-10-10 NOTE — PROGRESS NOTE ADULT - ASSESSMENT
59 year old male, with previously diagnosed acute myeloid leukemia. s/p induction chemotherapy with Daunorubicin/Cytarabine and HIDAC consolidation chemotherapy, now admitted for Haplo-identical stem cell transplant from son:    - No evidence of volume overload at present.  He is on fluids and has been getting IV Lasix prn to maintain net negative.     - Continue to maintain strict I's and O's, and to monitor for signs of volume overload, which he is at risk for.  - No evidence of acute ischemia  - BP is soft this AM.   - Continue ACEI and BB at current doses as tolerated by BP.  He was unable to get both this morning. Attempt to reschedule morning BB. Spoke with nurse  - Continue heparin gtt for hx of dvt/pe and hypercoagulable state, and adjust rate per protocol. Monitor platelet count which <50 but stable over the past few days  - Monitor and replete lytes  - To follow while admitted

## 2019-10-10 NOTE — PROGRESS NOTE ADULT - SUBJECTIVE AND OBJECTIVE BOX
[facility]    Ede Zamudio, Riki, Edgar, Gem, Jose, Tabitha      [phone]    CHIEF COMPLAINT: Patient is a 59y old  Male who presents with a chief complaint of Allogenic stem cell transplant (10 Oct 2019 07:57)      Follow Up: nicm. bm tx    Interim history: The patient reports no new symptoms.  Denies chest discomfort and shortness of breath.  No abdominal pain.  No new neurologic symptoms.      MEDICATIONS  (STANDING):  acetaminophen   Tablet .. 650 milliGRAM(s) Oral every 6 hours  acetaminophen   Tablet .. 650 milliGRAM(s) Oral <User Schedule>  acyclovir   Oral Tab/Cap 400 milliGRAM(s) Oral every 8 hours  ALPRAZolam 0.5 milliGRAM(s) Oral at bedtime  aprepitant 80 milliGRAM(s) Oral every 24 hours  Biotene Dry Mouth Oral Rinse 5 milliLiter(s) Swish and Spit five times a day  chlorhexidine 4% Liquid 1 Application(s) Topical <User Schedule>  ciprofloxacin     Tablet 500 milliGRAM(s) Oral every 12 hours  clotrimazole Lozenge 1 Lozenge Oral five times a day  diphenhydrAMINE   Injectable 25 milliGRAM(s) IV Push <User Schedule>  docusate sodium 100 milliGRAM(s) Oral three times a day  enalapril 2.5 milliGRAM(s) Oral daily  finasteride 5 milliGRAM(s) Oral daily  folic acid 1 milliGRAM(s) Oral daily  heparin  Infusion 339 Unit(s)/Hr (3.39 mL/Hr) IV Continuous <Continuous>  immune   globulin 10% (GAMMAGARD) IVPB 30 Gram(s) IV Intermittent <User Schedule>  metoprolol succinate ER 12.5 milliGRAM(s) Oral two times a day  multivitamin 1 Tablet(s) Oral daily  ondansetron Injectable 8 milliGRAM(s) IV Push every 8 hours  palifermin Injectable  (eMAR) 4860 MICROGram(s) IV Push every 24 hours  pantoprazole    Tablet 40 milliGRAM(s) Oral before breakfast  senna 2 Tablet(s) Oral at bedtime  sertraline 50 milliGRAM(s) Oral at bedtime  sodium bicarbonate Mouth Rinse 10 milliLiter(s) Swish and Spit five times a day  sodium chloride 0.45% 1000 milliLiter(s) (150 mL/Hr) IV Continuous <Continuous>  sodium chloride 0.45%. 1000 milliLiter(s) (200 mL/Hr) IV Continuous <Continuous>  sodium chloride 0.9%. 1000 milliLiter(s) (20 mL/Hr) IV Continuous <Continuous>  tamsulosin 0.4 milliGRAM(s) Oral at bedtime  ursodiol Capsule 300 milliGRAM(s) Oral two times a day with meals  voriconazole 200 milliGRAM(s) Oral every 12 hours    MEDICATIONS  (PRN):  acetaminophen   Tablet .. 650 milliGRAM(s) Oral every 6 hours PRN Temp greater or equal to 38C (100.4F), Mild Pain (1 - 3)  diphenhydrAMINE   Injectable 25 milliGRAM(s) IV Push every 4 hours PRN Allergy symptoms  metoclopramide Injectable 10 milliGRAM(s) IV Push every 6 hours PRN Nausea and/or Vomiting  sodium chloride 0.9% lock flush 10 milliLiter(s) IV Push every 1 hour PRN Pre/post blood products, medications, blood draw, and to maintain line patency      REVIEW OF SYSTEMS:  eye, ent, GI, , allergic, dermatologic, musculoskeletal and neurologic are negative except as described above    Vital Signs Last 24 Hrs  T(C): 37.2 (10 Oct 2019 09:27), Max: 37.2 (10 Oct 2019 09:27)  T(F): 99 (10 Oct 2019 09:27), Max: 99 (10 Oct 2019 09:27)  HR: 94 (10 Oct 2019 09:27) (75 - 100)  BP: 105/71 (10 Oct 2019 09:27) (101/56 - 138/87)  BP(mean): --  RR: 18 (10 Oct 2019 09:27) (18 - 20)  SpO2: 96% (10 Oct 2019 09:27) (95% - 97%)    I&O's Summary    09 Oct 2019 07:01  -  10 Oct 2019 07:00  --------------------------------------------------------  IN: 4354 mL / OUT: 4775 mL / NET: -421 mL    10 Oct 2019 07:01  -  10 Oct 2019 10:46  --------------------------------------------------------  IN: 806 mL / OUT: 1425 mL / NET: -619 mL        Telemetry past 24h:    PHYSICAL EXAM:    Constitutional: well-nourished, well-developed, NAD   HEENT:  MMM, sclerae anicteric, conjunctivae clear, no oral cyanosis.  Pulmonary: Non-labored, breath sounds are clear bilaterally, No wheezing, rales or rhonchi  Cardiovascular: Regular, S1 and S2.  No murmur.  No rubs, gallops or clicks  Gastrointestinal: Bowel Sounds present, soft, nontender.   Lymph: No peripheral edema.   Neurological: Alert, no focal deficits  Skin: No rashes.  Psych:  Mood & affect appropriate    LABS: All Labs Reviewed:                        7.8    0.74  )-----------( 47       ( 10 Oct 2019 06:56 )             23.0                         7.8    0.67  )-----------( 41       ( 09 Oct 2019 08:18 )             23.2                         8.2    1.58  )-----------( 49       ( 08 Oct 2019 07:28 )             24.6     10 Oct 2019 07:05    140    |  104    |  24     ----------------------------<  95     3.6     |  27     |  1.12   09 Oct 2019 08:18    136    |  102    |  24     ----------------------------<  95     4.6     |  23     |  1.28   08 Oct 2019 07:14    136    |  103    |  30     ----------------------------<  98     4.7     |  23     |  1.45     Ca    8.2        10 Oct 2019 07:05  Ca    8.7        09 Oct 2019 08:18  Ca    9.4        08 Oct 2019 07:14  Phos  3.4       10 Oct 2019 07:05  Phos  4.2       09 Oct 2019 08:18  Phos  4.5       08 Oct 2019 07:14  Mg     1.7       10 Oct 2019 07:05  Mg     1.9       09 Oct 2019 08:18  Mg     2.1       08 Oct 2019 07:14    TPro  5.9    /  Alb  3.4    /  TBili  0.4    /  DBili  x      /  AST  21     /  ALT  18     /  AlkPhos  58     10 Oct 2019 07:05  TPro  6.1    /  Alb  3.5    /  TBili  0.4    /  DBili  <0.1   /  AST  26     /  ALT  21     /  AlkPhos  61     09 Oct 2019 08:18  TPro  6.3    /  Alb  3.5    /  TBili  0.3    /  DBili  x      /  AST  33     /  ALT  21     /  AlkPhos  65     08 Oct 2019 07:14    PT/INR - ( 10 Oct 2019 06:56 )   PT: 11.1 sec;   INR: 0.97 ratio         PTT - ( 10 Oct 2019 06:56 )  PTT:30.8 sec      Blood Culture: Organism --  Gram Stain Blood -- Gram Stain --  Specimen Source STER [id number]  Culture-Blood --            RADIOLOGY:    EKG:    Echo:

## 2019-10-10 NOTE — PROGRESS NOTE ADULT - SUBJECTIVE AND OBJECTIVE BOX
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    S: Patient seen and examined with HPC Transplant Team:   No acute complaints today    Denies mouth / tongue / throat pain, dyspnea, cough, nausea, vomiting, diarrhea, abdominal pain     O: Vitals:   Vital Signs Last 24 Hrs  T(C): 36.8 (10 Oct 2019 05:51), Max: 37 (09 Oct 2019 09:00)  T(F): 98.2 (10 Oct 2019 05:51), Max: 98.6 (09 Oct 2019 09:00)  HR: 85 (10 Oct 2019 05:51) (75 - 100)  BP: 107/65 (10 Oct 2019 05:51) (101/56 - 138/87)  RR: 20 (10 Oct 2019 05:51) (18 - 20)  SpO2: 97% (10 Oct 2019 05:51) (95% - 97%)    Admit weight: 81.5 kg  Daily     Daily Weight in k.8 (09 Oct 2019 10:00)    Intake / Output:   10-09 @ 07:01  -  10-10 @ 07:00  --------------------------------------------------------  IN: 4354 mL / OUT: 4775 mL / NET: -421 mL    PE:   Oropharynx: no ulceration or erythema  Oral Mucositis:      none                                                  Grade:   CVS: reg S1 S2  Lungs: CTA  Abdomen: soft, + normoactive BS, NT, ND  Extremities: No C/C/E  Gastric Mucositis:     none                                             Grade:   Intestinal Mucositis:      none                                        Grade:   Skin: no rash  TLC:  C/D/I  Neuro: Alert and oriented x3  Pain: none    Labs:   CBC Full  -  ( 10 Oct 2019 06:56 )  WBC Count : x  Hemoglobin : 7.8 g/dL  Hematocrit : 23.0 %  Platelet Count - Automated : 47 K/uL  Mean Cell Volume : 94.7 fl  Mean Cell Hemoglobin : 32.1 pg  Mean Cell Hemoglobin Concentration : 33.9 gm/dL  Auto Neutrophil # : x  Auto Lymphocyte # : x  Auto Monocyte # : x  Auto Eosinophil # : x  Auto Basophil # : x  Auto Neutrophil % : x  Auto Lymphocyte % : x  Auto Monocyte % : x  Auto Eosinophil % : x  Auto Basophil % : x                          7.8    x     )-----------( 47       ( 10 Oct 2019 06:56 )             23.0     10-10    140  |  104  |  24<H>  ----------------------------<  95  3.6   |  27  |  1.12    Ca    8.2<L>      10 Oct 2019 07:05  Phos  3.4     10-10  Mg     1.7     10-10    TPro  5.9<L>  /  Alb  3.4  /  TBili  0.4  /  DBili  x   /  AST  21  /  ALT  18  /  AlkPhos  58  10-10    PT/INR - ( 10 Oct 2019 06:56 )   PT: 11.1 sec;   INR: 0.97 ratio         PTT - ( 10 Oct 2019 06:56 )  PTT:30.8 sec  LIVER FUNCTIONS - ( 10 Oct 2019 07:05 )  Alb: 3.4 g/dL / Pro: 5.9 g/dL / ALK PHOS: 58 U/L / ALT: 18 U/L / AST: 21 U/L / GGT: x           Lactate Dehydrogenase, Serum: 189 U/L (10-10 @ 07:05)  Lactate Dehydrogenase, Serum: 159 U/L (10-09 @ 08:18)             Cultures:         Radiology:       Meds:   Antimicrobials:   acyclovir   Oral Tab/Cap 400 milliGRAM(s) Oral every 8 hours  ciprofloxacin     Tablet 500 milliGRAM(s) Oral every 12 hours  clotrimazole Lozenge 1 Lozenge Oral five times a day  voriconazole 200 milliGRAM(s) Oral every 12 hours      Heme / Onc:   heparin  Infusion 339 Unit(s)/Hr IV Continuous <Continuous>      GI:  docusate sodium 100 milliGRAM(s) Oral three times a day  pantoprazole    Tablet 40 milliGRAM(s) Oral before breakfast  senna 2 Tablet(s) Oral at bedtime  sodium bicarbonate Mouth Rinse 10 milliLiter(s) Swish and Spit five times a day  ursodiol Capsule 300 milliGRAM(s) Oral two times a day with meals      Cardiovascular:   enalapril 2.5 milliGRAM(s) Oral daily  metoprolol succinate ER 12.5 milliGRAM(s) Oral two times a day  tamsulosin 0.4 milliGRAM(s) Oral at bedtime      Immunologic:   immune   globulin 10% (GAMMAGARD) IVPB 30 Gram(s) IV Intermittent <User Schedule>      Other medications:   acetaminophen   Tablet .. 650 milliGRAM(s) Oral every 6 hours  acetaminophen   Tablet .. 650 milliGRAM(s) Oral <User Schedule>  ALPRAZolam 0.5 milliGRAM(s) Oral at bedtime  aprepitant 80 milliGRAM(s) Oral every 24 hours  Biotene Dry Mouth Oral Rinse 5 milliLiter(s) Swish and Spit five times a day  chlorhexidine 4% Liquid 1 Application(s) Topical <User Schedule>  diphenhydrAMINE   Injectable 25 milliGRAM(s) IV Push <User Schedule>  finasteride 5 milliGRAM(s) Oral daily  folic acid 1 milliGRAM(s) Oral daily  multivitamin 1 Tablet(s) Oral daily  ondansetron Injectable 8 milliGRAM(s) IV Push every 8 hours  palifermin Injectable  (eMAR) 4860 MICROGram(s) IV Push every 24 hours  sertraline 50 milliGRAM(s) Oral at bedtime  sodium chloride 0.45% 1000 milliLiter(s) IV Continuous <Continuous>  sodium chloride 0.45%. 1000 milliLiter(s) IV Continuous <Continuous>  sodium chloride 0.9%. 1000 milliLiter(s) IV Continuous <Continuous>      PRN:   acetaminophen   Tablet .. 650 milliGRAM(s) Oral every 6 hours PRN  diphenhydrAMINE   Injectable 25 milliGRAM(s) IV Push every 4 hours PRN  metoclopramide Injectable 10 milliGRAM(s) IV Push every 6 hours PRN  sodium chloride 0.9% lock flush 10 milliLiter(s) IV Push every 1 hour PRN      A/P:  59 year old male with a history of AML  Pre :  Allogeneic PBSCT day +1  HPC transplant today - continue transplant hydration for 24 hours post infusion of cells   Strict I&O, daily weights, prn diuresis   Start cipro for ANC < 1000; if T >/= 38C, pan cx, CXR & change cipro to cefepime 2g IV q 8 hours     1. Infectious Disease:   acyclovir   Oral Tab/Cap 400 milliGRAM(s) Oral every 8 hours  ciprofloxacin     Tablet 500 milliGRAM(s) Oral every 12 hours  clotrimazole Lozenge 1 Lozenge Oral five times a day  voriconazole 200 milliGRAM(s) Oral every 12 hours    2. VOD Prophylaxis: Actigall, Glutamine, Heparin (dosed at 100 units / kg / day)     3. GI Prophylaxis:   pantoprazole    Tablet 40 milliGRAM(s) Oral before breakfast    4. Mouthcare - NS / NaHCO3 rinses, Mycelex, Biotene; Skin care     5. GVHD prophylaxis   Tacro, MMF to start on day + 5   CTX days + 3, +4   TBI day -1     6. Transfuse & replete electrolytes prn     7. IV hydration, daily weights, strict I&O, prn diuresis     8. PO intake as tolerated, nutrition follow up as needed, MVI, folic acid     9. Antiemetics, anti-diarrhea medications:   metoclopramide Injectable 10 milliGRAM(s) IV Push every 6 hours PRN  ondansetron Injectable 8 milliGRAM(s) IV Push every 8 hours  aprepitant 80 milliGRAM(s) Oral every 24 hours    10. OOB as tolerated, physical therapy consult if needed     11. Monitor coags / fibrinogen 2x week, vitamin K as needed     12. Monitor closely for clinical changes, monitor for fevers     13. Emotional support provided, plan of care discussed and questions addressed     14. Patient education done regarding plan of care, restrictions and discharge planning     15. Continue regular social work input     I have written the above note for Dr. Gaytan who performed service with me in the room.   Susan Grey NP-C ([phone])    I have seen and examined patient with NP, I agree with above note as scribed. 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    S: Patient seen and examined with HPC Transplant Team:   No acute complaints today    Denies mouth / tongue / throat pain, dyspnea, cough, nausea, vomiting, diarrhea, abdominal pain     O: Vitals:   Vital Signs Last 24 Hrs  T(C): 36.8 (10 Oct 2019 05:51), Max: 37 (09 Oct 2019 09:00)  T(F): 98.2 (10 Oct 2019 05:51), Max: 98.6 (09 Oct 2019 09:00)  HR: 85 (10 Oct 2019 05:51) (75 - 100)  BP: 107/65 (10 Oct 2019 05:51) (101/56 - 138/87)  RR: 20 (10 Oct 2019 05:51) (18 - 20)  SpO2: 97% (10 Oct 2019 05:51) (95% - 97%)    Admit weight: 81.5 kg  Daily Weight in k.8 (09 Oct 2019 10:00)  Today's weight:     Intake / Output:   10-09 @ 07:01  -  10-10 @ 07:00  --------------------------------------------------------  IN: 4354 mL / OUT: 4775 mL / NET: -421 mL    PE:   Oropharynx: no ulceration or erythema  Oral Mucositis:      none                                                  Grade:   CVS: reg S1 S2  Lungs: CTA  Abdomen: soft, + normoactive BS, NT, ND  Extremities: No C/C/E  Gastric Mucositis:     none                                             Grade:   Intestinal Mucositis:      none                                        Grade:   Skin: no rash  TLC:  C/D/I  Neuro: Alert and oriented x3  Pain: none    Labs:   CBC Full  -  ( 10 Oct 2019 06:56 )  WBC Count : x  Hemoglobin : 7.8 g/dL  Hematocrit : 23.0 %  Platelet Count - Automated : 47 K/uL  Mean Cell Volume : 94.7 fl  Mean Cell Hemoglobin : 32.1 pg  Mean Cell Hemoglobin Concentration : 33.9 gm/dL  Auto Neutrophil # : x  Auto Lymphocyte # : x  Auto Monocyte # : x  Auto Eosinophil # : x  Auto Basophil # : x  Auto Neutrophil % : x  Auto Lymphocyte % : x  Auto Monocyte % : x  Auto Eosinophil % : x  Auto Basophil % : x                          7.8    x     )-----------( 47       ( 10 Oct 2019 06:56 )             23.0     10-10    140  |  104  |  24<H>  ----------------------------<  95  3.6   |  27  |  1.12    Ca    8.2<L>      10 Oct 2019 07:05  Phos  3.4     10-10  Mg     1.7     10-10    TPro  5.9<L>  /  Alb  3.4  /  TBili  0.4  /  DBili  x   /  AST  21  /  ALT  18  /  AlkPhos  58  10-10    PT/INR - ( 10 Oct 2019 06:56 )   PT: 11.1 sec;   INR: 0.97 ratio         PTT - ( 10 Oct 2019 06:56 )  PTT:30.8 sec  LIVER FUNCTIONS - ( 10 Oct 2019 07:05 )  Alb: 3.4 g/dL / Pro: 5.9 g/dL / ALK PHOS: 58 U/L / ALT: 18 U/L / AST: 21 U/L / GGT: x           Lactate Dehydrogenase, Serum: 189 U/L (10-10 @ 07:05)  Lactate Dehydrogenase, Serum: 159 U/L (10-09 @ 08:18)    Meds:   Antimicrobials:   acyclovir   Oral Tab/Cap 400 milliGRAM(s) Oral every 8 hours  ciprofloxacin     Tablet 500 milliGRAM(s) Oral every 12 hours  clotrimazole Lozenge 1 Lozenge Oral five times a day  voriconazole 200 milliGRAM(s) Oral every 12 hours      Heme / Onc:   heparin  Infusion 339 Unit(s)/Hr IV Continuous <Continuous>      GI:  docusate sodium 100 milliGRAM(s) Oral three times a day  pantoprazole    Tablet 40 milliGRAM(s) Oral before breakfast  senna 2 Tablet(s) Oral at bedtime  sodium bicarbonate Mouth Rinse 10 milliLiter(s) Swish and Spit five times a day  ursodiol Capsule 300 milliGRAM(s) Oral two times a day with meals      Cardiovascular:   enalapril 2.5 milliGRAM(s) Oral daily  metoprolol succinate ER 12.5 milliGRAM(s) Oral two times a day  tamsulosin 0.4 milliGRAM(s) Oral at bedtime      Immunologic:   immune   globulin 10% (GAMMAGARD) IVPB 30 Gram(s) IV Intermittent <User Schedule>      Other medications:   acetaminophen   Tablet .. 650 milliGRAM(s) Oral every 6 hours  acetaminophen   Tablet .. 650 milliGRAM(s) Oral <User Schedule>  ALPRAZolam 0.5 milliGRAM(s) Oral at bedtime  aprepitant 80 milliGRAM(s) Oral every 24 hours  Biotene Dry Mouth Oral Rinse 5 milliLiter(s) Swish and Spit five times a day  chlorhexidine 4% Liquid 1 Application(s) Topical <User Schedule>  diphenhydrAMINE   Injectable 25 milliGRAM(s) IV Push <User Schedule>  finasteride 5 milliGRAM(s) Oral daily  folic acid 1 milliGRAM(s) Oral daily  multivitamin 1 Tablet(s) Oral daily  ondansetron Injectable 8 milliGRAM(s) IV Push every 8 hours  palifermin Injectable  (eMAR) 4860 MICROGram(s) IV Push every 24 hours  sertraline 50 milliGRAM(s) Oral at bedtime  sodium chloride 0.45% 1000 milliLiter(s) IV Continuous <Continuous>  sodium chloride 0.45%. 1000 milliLiter(s) IV Continuous <Continuous>  sodium chloride 0.9%. 1000 milliLiter(s) IV Continuous <Continuous>      PRN:   acetaminophen   Tablet .. 650 milliGRAM(s) Oral every 6 hours PRN  diphenhydrAMINE   Injectable 25 milliGRAM(s) IV Push every 4 hours PRN  metoclopramide Injectable 10 milliGRAM(s) IV Push every 6 hours PRN  sodium chloride 0.9% lock flush 10 milliLiter(s) IV Push every 1 hour PRN      A/P:  59 year old male with a history of AML  Pre :  Allogeneic PBSCT day +1  HPC transplant today - continue transplant hydration for 24 hours post infusion of cells   Strict I&O, daily weights, prn diuresis   Start cipro for ANC < 1000; if T >/= 38C, pan cx, CXR & change cipro to cefepime 2g IV q 8 hours     1. Infectious Disease:   acyclovir   Oral Tab/Cap 400 milliGRAM(s) Oral every 8 hours  ciprofloxacin     Tablet 500 milliGRAM(s) Oral every 12 hours  clotrimazole Lozenge 1 Lozenge Oral five times a day  voriconazole 200 milliGRAM(s) Oral every 12 hours    2. VOD Prophylaxis: Actigall, Glutamine, Heparin (dosed at 100 units / kg / day)     3. GI Prophylaxis:   pantoprazole    Tablet 40 milliGRAM(s) Oral before breakfast    4. Mouthcare - NS / NaHCO3 rinses, Mycelex, Biotene; Skin care     5. GVHD prophylaxis   Tacro, MMF to start on day + 5   CTX days + 3, +4   TBI day -1     6. Transfuse & replete electrolytes prn     7. IV hydration, daily weights, strict I&O, prn diuresis     8. PO intake as tolerated, nutrition follow up as needed, MVI, folic acid     9. Antiemetics, anti-diarrhea medications:   metoclopramide Injectable 10 milliGRAM(s) IV Push every 6 hours PRN  ondansetron Injectable 8 milliGRAM(s) IV Push every 8 hours  aprepitant 80 milliGRAM(s) Oral every 24 hours    10. OOB as tolerated, physical therapy consult if needed     11. Monitor coags / fibrinogen 2x week, vitamin K as needed     12. Monitor closely for clinical changes, monitor for fevers     13. Emotional support provided, plan of care discussed and questions addressed     14. Patient education done regarding plan of care, restrictions and discharge planning     15. Continue regular social work input     I have written the above note for Dr. Gaytan who performed service with me in the room.   Susan Grey NP-C ([phone])    I have seen and examined patient with NP, I agree with above note as scribed. 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    S: Patient seen and examined with HPC Transplant Team:   No acute complaints     Denies mouth / tongue / throat pain, dyspnea, cough, nausea, vomiting, diarrhea, abdominal pain     O: Vitals:   Vital Signs Last 24 Hrs  T(C): 36.8 (10 Oct 2019 05:51), Max: 37 (09 Oct 2019 09:00)  T(F): 98.2 (10 Oct 2019 05:51), Max: 98.6 (09 Oct 2019 09:00)  HR: 85 (10 Oct 2019 05:51) (75 - 100)  BP: 107/65 (10 Oct 2019 05:51) (101/56 - 138/87)  RR: 20 (10 Oct 2019 05:51) (18 - 20)  SpO2: 97% (10 Oct 2019 05:51) (95% - 97%)    Admit weight: 81.5 kg  Daily Weight in k.8 (09 Oct 2019 10:00)  Today's Weight in k.2 (10 Oct 2019 09:27)    Intake / Output:   10-09 @ 07:01  -  10-10 @ 07:00  --------------------------------------------------------  IN: 4354 mL / OUT: 4775 mL / NET: -421 mL    PE:   Oropharynx: no ulceration or erythema  Oral Mucositis:      none                                                  Grade:   CVS: reg S1 S2  Lungs: CTA  Abdomen: soft, + normoactive BS, NT, ND  Extremities: No C/C/E  Gastric Mucositis:     none                                             Grade:   Intestinal Mucositis:      none                                        Grade:   Skin: no rash  TLC:  C/D/I  Neuro: Alert and oriented x3  Pain: none    Labs:   CBC Full  -  ( 10 Oct 2019 06:56 )  WBC Count : x  Hemoglobin : 7.8 g/dL  Hematocrit : 23.0 %  Platelet Count - Automated : 47 K/uL  Mean Cell Volume : 94.7 fl  Mean Cell Hemoglobin : 32.1 pg  Mean Cell Hemoglobin Concentration : 33.9 gm/dL  Auto Neutrophil # : x  Auto Lymphocyte # : x  Auto Monocyte # : x  Auto Eosinophil # : x  Auto Basophil # : x  Auto Neutrophil % : x  Auto Lymphocyte % : x  Auto Monocyte % : x  Auto Eosinophil % : x  Auto Basophil % : x                          7.8    0.74   )-----------( 47       ( 10 Oct 2019 06:56 )             23.0     10-10    140  |  104  |  24<H>  ----------------------------<  95  3.6   |  27  |  1.12    Ca    8.2<L>      10 Oct 2019 07:05  Phos  3.4     10-10  Mg     1.7     10-10    TPro  5.9<L>  /  Alb  3.4  /  TBili  0.4  /  DBili  x   /  AST  21  /  ALT  18  /  AlkPhos  58  10-10    PT/INR - ( 10 Oct 2019 06:56 )   PT: 11.1 sec;   INR: 0.97 ratio    PTT - ( 10 Oct 2019 06:56 )  PTT:30.8 sec    LIVER FUNCTIONS - ( 10 Oct 2019 07:05 )  Alb: 3.4 g/dL / Pro: 5.9 g/dL / ALK PHOS: 58 U/L / ALT: 18 U/L / AST: 21 U/L / GGT: x           Lactate Dehydrogenase, Serum: 189 U/L (10-10 @ 07:05)  Lactate Dehydrogenase, Serum: 159 U/L (10-09 @ 08:18)    Meds:   Antimicrobials:   acyclovir   Oral Tab/Cap 400 milliGRAM(s) Oral every 8 hours  ciprofloxacin     Tablet 500 milliGRAM(s) Oral every 12 hours  clotrimazole Lozenge 1 Lozenge Oral five times a day  voriconazole 200 milliGRAM(s) Oral every 12 hours      Heme / Onc:   heparin  Infusion 339 Unit(s)/Hr IV Continuous <Continuous>      GI:  docusate sodium 100 milliGRAM(s) Oral three times a day  pantoprazole    Tablet 40 milliGRAM(s) Oral before breakfast  senna 2 Tablet(s) Oral at bedtime  sodium bicarbonate Mouth Rinse 10 milliLiter(s) Swish and Spit five times a day  ursodiol Capsule 300 milliGRAM(s) Oral two times a day with meals      Cardiovascular:   enalapril 2.5 milliGRAM(s) Oral daily  metoprolol succinate ER 12.5 milliGRAM(s) Oral two times a day  tamsulosin 0.4 milliGRAM(s) Oral at bedtime      Immunologic:   immune   globulin 10% (GAMMAGARD) IVPB 30 Gram(s) IV Intermittent <User Schedule>      Other medications:   acetaminophen   Tablet .. 650 milliGRAM(s) Oral every 6 hours  acetaminophen   Tablet .. 650 milliGRAM(s) Oral <User Schedule>  ALPRAZolam 0.5 milliGRAM(s) Oral at bedtime  aprepitant 80 milliGRAM(s) Oral every 24 hours  Biotene Dry Mouth Oral Rinse 5 milliLiter(s) Swish and Spit five times a day  chlorhexidine 4% Liquid 1 Application(s) Topical <User Schedule>  diphenhydrAMINE   Injectable 25 milliGRAM(s) IV Push <User Schedule>  finasteride 5 milliGRAM(s) Oral daily  folic acid 1 milliGRAM(s) Oral daily  multivitamin 1 Tablet(s) Oral daily  ondansetron Injectable 8 milliGRAM(s) IV Push every 8 hours  palifermin Injectable  (eMAR) 4860 MICROGram(s) IV Push every 24 hours  sertraline 50 milliGRAM(s) Oral at bedtime  sodium chloride 0.45% 1000 milliLiter(s) IV Continuous <Continuous>  sodium chloride 0.45%. 1000 milliLiter(s) IV Continuous <Continuous>  sodium chloride 0.9%. 1000 milliLiter(s) IV Continuous <Continuous>      PRN:   acetaminophen   Tablet .. 650 milliGRAM(s) Oral every 6 hours PRN  diphenhydrAMINE   Injectable 25 milliGRAM(s) IV Push every 4 hours PRN  metoclopramide Injectable 10 milliGRAM(s) IV Push every 6 hours PRN  sodium chloride 0.9% lock flush 10 milliLiter(s) IV Push every 1 hour PRN      A/P:  59 year old male with a history of AML  Pre :  Allogeneic PBSCT day +1  s/p HPC transplant yesterday 10/9 - continue transplant hydration for 24 hours post infusion of cells   Strict I&O, daily weights, prn diuresis   Start cipro for ANC < 1000; if T >/= 38C, pan cx, CXR & change cipro to cefepime 2g IV q 8 hours     1. Infectious Disease:   acyclovir   Oral Tab/Cap 400 milliGRAM(s) Oral every 8 hours  ciprofloxacin     Tablet 500 milliGRAM(s) Oral every 12 hours  clotrimazole Lozenge 1 Lozenge Oral five times a day  voriconazole 200 milliGRAM(s) Oral every 12 hours    2. VOD Prophylaxis: Actigall, Glutamine, Heparin (dosed at 100 units / kg / day)     3. GI Prophylaxis:   pantoprazole    Tablet 40 milliGRAM(s) Oral before breakfast    4. Mouthcare - NS / NaHCO3 rinses, Mycelex, Biotene; Skin care     5. GVHD prophylaxis   Tacro, MMF to start on day + 5   CTX days + 3, +4   TBI day -1     6. Transfuse & replete electrolytes prn     7. IV hydration, daily weights, strict I&O, prn diuresis     8. PO intake as tolerated, nutrition follow up as needed, MVI, folic acid     9. Antiemetics, anti-diarrhea medications:   metoclopramide Injectable 10 milliGRAM(s) IV Push every 6 hours PRN  ondansetron Injectable 8 milliGRAM(s) IV Push every 8 hours  aprepitant 80 milliGRAM(s) Oral every 24 hours    10. OOB as tolerated, physical therapy consult if needed     11. Monitor coags / fibrinogen 2x week, vitamin K as needed     12. Monitor closely for clinical changes, monitor for fevers     13. Emotional support provided, plan of care discussed and questions addressed     14. Patient education done regarding plan of care, restrictions and discharge planning     15. Continue regular social work input     I have written the above note for Dr. Gaytan who performed service with me in the room.   Susan Grey NP-C ([phone])    I have seen and examined patient with NP, I agree with above note as scribed.

## 2019-10-11 DIAGNOSIS — R63.0 ANOREXIA: ICD-10-CM

## 2019-10-11 LAB
ALBUMIN SERPL ELPH-MCNC: 3.4 G/DL — SIGNIFICANT CHANGE UP (ref 3.3–5)
ALP SERPL-CCNC: 61 U/L — SIGNIFICANT CHANGE UP (ref 40–120)
ALT FLD-CCNC: 14 U/L — SIGNIFICANT CHANGE UP (ref 10–45)
ANION GAP SERPL CALC-SCNC: 10 MMOL/L — SIGNIFICANT CHANGE UP (ref 5–17)
AST SERPL-CCNC: 15 U/L — SIGNIFICANT CHANGE UP (ref 10–40)
BILIRUB DIRECT SERPL-MCNC: 0.1 MG/DL — SIGNIFICANT CHANGE UP (ref 0–0.2)
BILIRUB INDIRECT FLD-MCNC: 0.4 MG/DL — SIGNIFICANT CHANGE UP (ref 0.2–1)
BILIRUB SERPL-MCNC: 0.5 MG/DL — SIGNIFICANT CHANGE UP (ref 0.2–1.2)
BLD GP AB SCN SERPL QL: NEGATIVE — SIGNIFICANT CHANGE UP
BUN SERPL-MCNC: 18 MG/DL — SIGNIFICANT CHANGE UP (ref 7–23)
CALCIUM SERPL-MCNC: 8.3 MG/DL — LOW (ref 8.4–10.5)
CHLORIDE SERPL-SCNC: 100 MMOL/L — SIGNIFICANT CHANGE UP (ref 96–108)
CO2 SERPL-SCNC: 26 MMOL/L — SIGNIFICANT CHANGE UP (ref 22–31)
CREAT SERPL-MCNC: 1.21 MG/DL — SIGNIFICANT CHANGE UP (ref 0.5–1.3)
CULTURE RESULTS: SIGNIFICANT CHANGE UP
GLUCOSE SERPL-MCNC: 98 MG/DL — SIGNIFICANT CHANGE UP (ref 70–99)
HCT VFR BLD CALC: 22.3 % — LOW (ref 39–50)
HGB BLD-MCNC: 7.5 G/DL — LOW (ref 13–17)
LDH SERPL L TO P-CCNC: 138 U/L — SIGNIFICANT CHANGE UP (ref 50–242)
MAGNESIUM SERPL-MCNC: 1.5 MG/DL — LOW (ref 1.6–2.6)
MCHC RBC-ENTMCNC: 31.8 PG — SIGNIFICANT CHANGE UP (ref 27–34)
MCHC RBC-ENTMCNC: 33.6 GM/DL — SIGNIFICANT CHANGE UP (ref 32–36)
MCV RBC AUTO: 94.5 FL — SIGNIFICANT CHANGE UP (ref 80–100)
NRBC # BLD: 0 /100 WBCS — SIGNIFICANT CHANGE UP (ref 0–0)
PHOSPHATE SERPL-MCNC: 3.9 MG/DL — SIGNIFICANT CHANGE UP (ref 2.5–4.5)
PLATELET # BLD AUTO: 28 K/UL — LOW (ref 150–400)
POTASSIUM SERPL-MCNC: 3.7 MMOL/L — SIGNIFICANT CHANGE UP (ref 3.5–5.3)
POTASSIUM SERPL-SCNC: 3.7 MMOL/L — SIGNIFICANT CHANGE UP (ref 3.5–5.3)
PROT SERPL-MCNC: 5.9 G/DL — LOW (ref 6–8.3)
RBC # BLD: 2.36 M/UL — LOW (ref 4.2–5.8)
RBC # FLD: 16.4 % — HIGH (ref 10.3–14.5)
RH IG SCN BLD-IMP: POSITIVE — SIGNIFICANT CHANGE UP
SODIUM SERPL-SCNC: 136 MMOL/L — SIGNIFICANT CHANGE UP (ref 135–145)
SPECIMEN SOURCE: SIGNIFICANT CHANGE UP
WBC # BLD: 0.17 K/UL — CRITICAL LOW (ref 3.8–10.5)
WBC # FLD AUTO: 0.17 K/UL — CRITICAL LOW (ref 3.8–10.5)

## 2019-10-11 PROCEDURE — 99291 CRITICAL CARE FIRST HOUR: CPT

## 2019-10-11 PROCEDURE — 99233 SBSQ HOSP IP/OBS HIGH 50: CPT

## 2019-10-11 PROCEDURE — 99232 SBSQ HOSP IP/OBS MODERATE 35: CPT

## 2019-10-11 RX ORDER — MAGNESIUM SULFATE 500 MG/ML
2 VIAL (ML) INJECTION ONCE
Refills: 0 | Status: COMPLETED | OUTPATIENT
Start: 2019-10-11 | End: 2019-10-11

## 2019-10-11 RX ORDER — FUROSEMIDE 40 MG
40 TABLET ORAL
Refills: 0 | Status: COMPLETED | OUTPATIENT
Start: 2019-10-12 | End: 2019-10-14

## 2019-10-11 RX ADMIN — CEFEPIME 100 MILLIGRAM(S): 1 INJECTION, POWDER, FOR SOLUTION INTRAMUSCULAR; INTRAVENOUS at 20:04

## 2019-10-11 RX ADMIN — Medication 1 TABLET(S): at 12:49

## 2019-10-11 RX ADMIN — CEFEPIME 100 MILLIGRAM(S): 1 INJECTION, POWDER, FOR SOLUTION INTRAMUSCULAR; INTRAVENOUS at 15:07

## 2019-10-11 RX ADMIN — Medication 400 MILLIGRAM(S): at 19:58

## 2019-10-11 RX ADMIN — SERTRALINE 50 MILLIGRAM(S): 25 TABLET, FILM COATED ORAL at 20:01

## 2019-10-11 RX ADMIN — Medication 400 MILLIGRAM(S): at 06:24

## 2019-10-11 RX ADMIN — HEPARIN SODIUM 3.39 UNIT(S)/HR: 5000 INJECTION INTRAVENOUS; SUBCUTANEOUS at 19:53

## 2019-10-11 RX ADMIN — SENNA PLUS 2 TABLET(S): 8.6 TABLET ORAL at 19:59

## 2019-10-11 RX ADMIN — Medication 650 MILLIGRAM(S): at 09:00

## 2019-10-11 RX ADMIN — VORICONAZOLE 200 MILLIGRAM(S): 10 INJECTION, POWDER, LYOPHILIZED, FOR SOLUTION INTRAVENOUS at 06:24

## 2019-10-11 RX ADMIN — Medication 5 MILLILITER(S): at 12:48

## 2019-10-11 RX ADMIN — Medication 10 MILLILITER(S): at 12:48

## 2019-10-11 RX ADMIN — Medication 10 MILLILITER(S): at 00:15

## 2019-10-11 RX ADMIN — Medication 1 LOZENGE: at 12:48

## 2019-10-11 RX ADMIN — CEFEPIME 100 MILLIGRAM(S): 1 INJECTION, POWDER, FOR SOLUTION INTRAMUSCULAR; INTRAVENOUS at 05:51

## 2019-10-11 RX ADMIN — Medication 5 MILLILITER(S): at 00:15

## 2019-10-11 RX ADMIN — Medication 12.5 MILLIGRAM(S): at 18:42

## 2019-10-11 RX ADMIN — Medication 1 LOZENGE: at 00:15

## 2019-10-11 RX ADMIN — URSODIOL 300 MILLIGRAM(S): 250 TABLET, FILM COATED ORAL at 18:48

## 2019-10-11 RX ADMIN — Medication 650 MILLIGRAM(S): at 18:00

## 2019-10-11 RX ADMIN — Medication 1 LOZENGE: at 19:53

## 2019-10-11 RX ADMIN — Medication 5 MILLILITER(S): at 16:38

## 2019-10-11 RX ADMIN — URSODIOL 300 MILLIGRAM(S): 250 TABLET, FILM COATED ORAL at 08:47

## 2019-10-11 RX ADMIN — Medication 10 MILLILITER(S): at 16:38

## 2019-10-11 RX ADMIN — Medication 400 MILLIGRAM(S): at 15:16

## 2019-10-11 RX ADMIN — Medication 650 MILLIGRAM(S): at 01:30

## 2019-10-11 RX ADMIN — ONDANSETRON 8 MILLIGRAM(S): 8 TABLET, FILM COATED ORAL at 18:43

## 2019-10-11 RX ADMIN — VORICONAZOLE 200 MILLIGRAM(S): 10 INJECTION, POWDER, LYOPHILIZED, FOR SOLUTION INTRAVENOUS at 18:45

## 2019-10-11 RX ADMIN — APREPITANT 80 MILLIGRAM(S): 80 CAPSULE ORAL at 06:24

## 2019-10-11 RX ADMIN — Medication 100 MILLIGRAM(S): at 19:59

## 2019-10-11 RX ADMIN — Medication 1 LOZENGE: at 08:47

## 2019-10-11 RX ADMIN — Medication 1 LOZENGE: at 16:38

## 2019-10-11 RX ADMIN — FINASTERIDE 5 MILLIGRAM(S): 5 TABLET, FILM COATED ORAL at 15:38

## 2019-10-11 RX ADMIN — ONDANSETRON 8 MILLIGRAM(S): 8 TABLET, FILM COATED ORAL at 10:21

## 2019-10-11 RX ADMIN — Medication 4860 MICROGRAM(S): at 15:16

## 2019-10-11 RX ADMIN — Medication 1 MILLIGRAM(S): at 12:49

## 2019-10-11 RX ADMIN — TAMSULOSIN HYDROCHLORIDE 0.4 MILLIGRAM(S): 0.4 CAPSULE ORAL at 20:01

## 2019-10-11 RX ADMIN — ONDANSETRON 8 MILLIGRAM(S): 8 TABLET, FILM COATED ORAL at 01:18

## 2019-10-11 RX ADMIN — Medication 10 MILLILITER(S): at 08:47

## 2019-10-11 RX ADMIN — Medication 50 GRAM(S): at 10:23

## 2019-10-11 RX ADMIN — PANTOPRAZOLE SODIUM 40 MILLIGRAM(S): 20 TABLET, DELAYED RELEASE ORAL at 06:24

## 2019-10-11 RX ADMIN — Medication 650 MILLIGRAM(S): at 01:00

## 2019-10-11 RX ADMIN — SODIUM CHLORIDE 20 MILLILITER(S): 9 INJECTION INTRAMUSCULAR; INTRAVENOUS; SUBCUTANEOUS at 19:53

## 2019-10-11 RX ADMIN — Medication 5 MILLILITER(S): at 19:53

## 2019-10-11 RX ADMIN — Medication 5 MILLILITER(S): at 08:47

## 2019-10-11 RX ADMIN — Medication 0.5 MILLIGRAM(S): at 20:19

## 2019-10-11 RX ADMIN — Medication 10 MILLILITER(S): at 19:53

## 2019-10-11 NOTE — CHART NOTE - NSCHARTNOTEFT_GEN_A_CORE
MEDICINE NP    Notified by RN patient with temperature 100.8. Seen and examined at bedside. Patient is awake, NAD. Denies HA, CP, SOB, cough, N/V, or abd pain.    VITAL SIGNS:  T(C): 38.2 (10-11-19 @ 18:30), Max: 38.3 (10-11-19 @ 00:25)  HR: 125 (10-11-19 @ 18:30) (94 - 125)  BP: 107/68 (10-11-19 @ 18:30) (102/67 - 120/79)  RR: 18 (10-11-19 @ 18:30) (18 - 18)  SpO2: 97% (10-11-19 @ 18:30) (95% - 97%)  Wt(kg): --      LABORATORY:                          7.5    0.17  )-----------( 28       ( 11 Oct 2019 07:11 )             22.3       10-11    136  |  100  |  18  ----------------------------<  98  3.7   |  26  |  1.21    Ca    8.3<L>      11 Oct 2019 07:09  Phos  3.9     10-11  Mg     1.5     10-11    TPro  5.9<L>  /  Alb  3.4  /  TBili  0.5  /  DBili  0.1  /  AST  15  /  ALT  14  /  AlkPhos  61  10-11          MICROBIOLOGY:     MEDICATIONS  (STANDING):  acetaminophen   Tablet .. 650 milliGRAM(s) Oral every 6 hours  acetaminophen   Tablet .. 650 milliGRAM(s) Oral <User Schedule>  acyclovir   Oral Tab/Cap 400 milliGRAM(s) Oral every 8 hours  ALPRAZolam 0.5 milliGRAM(s) Oral at bedtime  aprepitant 80 milliGRAM(s) Oral every 24 hours  Biotene Dry Mouth Oral Rinse 5 milliLiter(s) Swish and Spit five times a day  cefepime   IVPB 2000 milliGRAM(s) IV Intermittent every 8 hours  chlorhexidine 4% Liquid 1 Application(s) Topical <User Schedule>  clotrimazole Lozenge 1 Lozenge Oral five times a day  diphenhydrAMINE   Injectable 25 milliGRAM(s) IV Push <User Schedule>  docusate sodium 100 milliGRAM(s) Oral three times a day  enalapril 2.5 milliGRAM(s) Oral daily  finasteride 5 milliGRAM(s) Oral daily  folic acid 1 milliGRAM(s) Oral daily  heparin  Infusion 339 Unit(s)/Hr (3.39 mL/Hr) IV Continuous <Continuous>  immune   globulin 10% (GAMMAGARD) IVPB 30 Gram(s) IV Intermittent <User Schedule>  metoprolol succinate ER 12.5 milliGRAM(s) Oral two times a day  multivitamin 1 Tablet(s) Oral daily  ondansetron Injectable 8 milliGRAM(s) IV Push every 8 hours  pantoprazole    Tablet 40 milliGRAM(s) Oral before breakfast  senna 2 Tablet(s) Oral at bedtime  sertraline 50 milliGRAM(s) Oral at bedtime  sodium bicarbonate Mouth Rinse 10 milliLiter(s) Swish and Spit five times a day  sodium chloride 0.45% 1000 milliLiter(s) (296 mL/Hr) IV Continuous <Continuous>  sodium chloride 0.45%. 1000 milliLiter(s) (200 mL/Hr) IV Continuous <Continuous>  sodium chloride 0.9%. 1000 milliLiter(s) (20 mL/Hr) IV Continuous <Continuous>  tamsulosin 0.4 milliGRAM(s) Oral at bedtime  ursodiol Capsule 300 milliGRAM(s) Oral two times a day with meals  voriconazole 200 milliGRAM(s) Oral every 12 hours        RADIOLOGY:          PHYSICAL EXAM:        Respiratory: diminished LS at base bilaterally. No wheezing, rhonchi, or crackles.    Cardiovascular: S1 S2+    Gastrointestinal: BS X4 active. soft. nontender.          ASSESSMENT/PLAN:   HPI:  Mr. Delong is a 59 year old male, with previously diagnosed acute myeloid leukemia. s/p induction chemotherapy with Daunorubicin/Cytarabine and  HIDAC consolidation chemotherapy, now admitted for Haplo-identical stem cell transplant from son. Other history includes non-ischemic cardiomyopathy (recent EF 25% 9/19/19), COLLEEN, DVT/PE, HTN, Factor V Leiden and amputation of right toe due to osteomyelitis.   Patient was initially being followed by [facility] for factor V Leiden on Xarelto. Labs were normal at check up. Had increased SOB and had repeat labs done and was told to go to ER. Patient went to [facility] hospital due to insurance reasons. He was diagnosed with AML and underwent treated with standard induction chemotherapy daunorubicin/cytarabine 7+3. Day 14 BM bx showed persistent disease and patient was reinduced with 5+2 at which time he developed a wound on the right tow with concern for osteomyelitis as well as right cheek fungal infection with aspergillosis and neutropenic fevers of 105. After hospitalization, patient was discharged to Subacute rehab for 3weeks. After discharge from rehab due to insurance reasons, the patient was seen at [facility] by Dr. Coelho. BM bx showed a CR and plan was made for consolidation chemo with HiDAc.  Patient admitted today, s/p Kepivance x 3 days 9/30, 10/1/ and 10/2. Patient only reports mild itching to forehead (from Kepivance).  Denies any pain, nausea, vomiting, diarrhea, chest pain, SOB. Last dose of Xarelto for PE/DVT was 9/25. Took all other regular AM meds this morning. (03 Oct 2019 12:48)        1) Neutropenic Fever  -tylenol and cooling measures prn for pyrexia  -BC x2, UA/UC and CXR are done  -c/w current iv ATB's  -cont assess and monitor.   -F/U primary team in AM

## 2019-10-11 NOTE — PROGRESS NOTE ADULT - SUBJECTIVE AND OBJECTIVE BOX
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    S: Patient seen and examined with HPC Transplant Team:   No acute complaints     Denies mouth / tongue / throat pain, dyspnea, cough, nausea, vomiting, diarrhea, abdominal pain    O: Vitals:   Vital Signs Last 24 Hrs  T(C): 37.6 (11 Oct 2019 05:14), Max: 38.3 (11 Oct 2019 00:25)  T(F): 99.7 (11 Oct 2019 05:14), Max: 100.9 (11 Oct 2019 00:25)  HR: 108 (11 Oct 2019 05:14) (94 - 108)  BP: 103/67 (11 Oct 2019 05:14) (97/65 - 120/74)  BP(mean): --  RR: 18 (11 Oct 2019 05:14) (18 - 18)  SpO2: 95% (11 Oct 2019 05:14) (95% - 99%)    Admit weight: 85.1kg  Daily     Daily Weight in k.2 (10 Oct 2019 09:27)    Intake / Output:   10-10 @ 07:01  -  10-11 @ 07:00  --------------------------------------------------------  IN: 3113 mL / OUT: 3225 mL / NET: -112 mL    PE:   Oropharynx: no ulceration or erythema  Oral Mucositis:      none                                                  Grade:   CVS: reg S1 S2  Lungs: CTA  Abdomen: soft, + normoactive BS, NT, ND  Extremities: No C/C/E  Gastric Mucositis:     none                                             Grade:   Intestinal Mucositis:      none                                        Grade:   Skin: no rash  TLC:  C/D/I  Neuro: Alert and oriented x3  Pain: none      Labs:       Meds:   Antimicrobials:   acyclovir   Oral Tab/Cap 400 milliGRAM(s) Oral every 8 hours  cefepime   IVPB 2000 milliGRAM(s) IV Intermittent every 8 hours  clotrimazole Lozenge 1 Lozenge Oral five times a day  voriconazole 200 milliGRAM(s) Oral every 12 hours      Heme / Onc:   heparin  Infusion 339 Unit(s)/Hr IV Continuous <Continuous>      GI:  docusate sodium 100 milliGRAM(s) Oral three times a day  pantoprazole    Tablet 40 milliGRAM(s) Oral before breakfast  senna 2 Tablet(s) Oral at bedtime  sodium bicarbonate Mouth Rinse 10 milliLiter(s) Swish and Spit five times a day  ursodiol Capsule 300 milliGRAM(s) Oral two times a day with meals      Cardiovascular:   enalapril 2.5 milliGRAM(s) Oral daily  metoprolol succinate ER 12.5 milliGRAM(s) Oral two times a day  tamsulosin 0.4 milliGRAM(s) Oral at bedtime      Immunologic:   immune   globulin 10% (GAMMAGARD) IVPB 30 Gram(s) IV Intermittent <User Schedule>      Other medications:   acetaminophen   Tablet .. 650 milliGRAM(s) Oral every 6 hours  acetaminophen   Tablet .. 650 milliGRAM(s) Oral <User Schedule>  ALPRAZolam 0.5 milliGRAM(s) Oral at bedtime  aprepitant 80 milliGRAM(s) Oral every 24 hours  Biotene Dry Mouth Oral Rinse 5 milliLiter(s) Swish and Spit five times a day  chlorhexidine 4% Liquid 1 Application(s) Topical <User Schedule>  diphenhydrAMINE   Injectable 25 milliGRAM(s) IV Push <User Schedule>  finasteride 5 milliGRAM(s) Oral daily  folic acid 1 milliGRAM(s) Oral daily  multivitamin 1 Tablet(s) Oral daily  ondansetron Injectable 8 milliGRAM(s) IV Push every 8 hours  palifermin Injectable  (eMAR) 4860 MICROGram(s) IV Push every 24 hours  sertraline 50 milliGRAM(s) Oral at bedtime  sodium chloride 0.45% 1000 milliLiter(s) IV Continuous <Continuous>  sodium chloride 0.45%. 1000 milliLiter(s) IV Continuous <Continuous>  sodium chloride 0.9%. 1000 milliLiter(s) IV Continuous <Continuous>      PRN:   acetaminophen   Tablet .. 650 milliGRAM(s) Oral every 6 hours PRN  diphenhydrAMINE   Injectable 25 milliGRAM(s) IV Push every 4 hours PRN  metoclopramide Injectable 10 milliGRAM(s) IV Push every 6 hours PRN  sodium chloride 0.9% lock flush 10 milliLiter(s) IV Push every 1 hour PRN      A/P:  59 year old male with a history of AML  Pre :  Allogeneic PBSCT day +2  s/p HPC transplant yesterday 10/9 - continue transplant hydration for 24 hours post infusion of cells   Strict I&O, daily weights, prn diuresis   Start cipro for ANC < 1000; if T >/= 38C, pan cx, CXR & change cipro to cefepime 2g IV q 8 hours     1. Infectious Disease:   acyclovir   Oral Tab/Cap 400 milliGRAM(s) Oral every 8 hours  ciprofloxacin     Tablet 500 milliGRAM(s) Oral every 12 hours  clotrimazole Lozenge 1 Lozenge Oral five times a day  voriconazole 200 milliGRAM(s) Oral every 12 hours    2. VOD Prophylaxis: Actigall, Glutamine, Heparin (dosed at 100 units / kg / day)     3. GI Prophylaxis:   pantoprazole    Tablet 40 milliGRAM(s) Oral before breakfast    4. Mouthcare - NS / NaHCO3 rinses, Mycelex, Biotene; Skin care     5. GVHD prophylaxis   Tacro, MMF to start on day + 5   CTX days + 3, +4   TBI day -1     6. Transfuse & replete electrolytes prn     7. IV hydration, daily weights, strict I&O, prn diuresis     8. PO intake as tolerated, nutrition follow up as needed, MVI, folic acid     9. Antiemetics, anti-diarrhea medications:   metoclopramide Injectable 10 milliGRAM(s) IV Push every 6 hours PRN  ondansetron Injectable 8 milliGRAM(s) IV Push every 8 hours  aprepitant 80 milliGRAM(s) Oral every 24 hours    10. OOB as tolerated, physical therapy consult if needed     11. Monitor coags / fibrinogen 2x week, vitamin K as needed     12. Monitor closely for clinical changes, monitor for fevers     13. Emotional support provided, plan of care discussed and questions addressed     14. Patient education done regarding plan of care, restrictions and discharge planning     15. Continue regular social work input     I have written the above note for Dr. Gaytan who performed service with me in the room.   Lourdes Nugent NP-C ([phone])    I have seen and examined patient with NP, I agree with above note as scribed. 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    S: Patient seen and examined with HPC Transplant Team:   No acute complaints     Denies mouth / tongue / throat pain, dyspnea, cough, nausea, vomiting, diarrhea, abdominal pain    O: Vitals:   Vital Signs Last 24 Hrs  T(C): 37.6 (11 Oct 2019 05:14), Max: 38.3 (11 Oct 2019 00:25)  T(F): 99.7 (11 Oct 2019 05:14), Max: 100.9 (11 Oct 2019 00:25)  HR: 108 (11 Oct 2019 05:14) (94 - 108)  BP: 103/67 (11 Oct 2019 05:14) (97/65 - 120/74)  BP(mean): --  RR: 18 (11 Oct 2019 05:14) (18 - 18)  SpO2: 95% (11 Oct 2019 05:14) (95% - 99%)    Admit weight: 85.1kg  Daily     Daily Weight in k.2 (10 Oct 2019 09:27)    Intake / Output:   10-10 @ 07:  -  10-11 @ 07:00  --------------------------------------------------------  IN: 3113 mL / OUT: 3225 mL / NET: -112 mL    PE:   Oropharynx: no ulceration or erythema  Oral Mucositis:      none                                                  Grade:   CVS: reg S1 S2  Lungs: CTA  Abdomen: soft, + normoactive BS, NT, ND  Extremities: No C/C/E  Gastric Mucositis:     none                                             Grade:   Intestinal Mucositis:      none                                        Grade:   Skin: no rash  TLC:  C/D/I  Neuro: Alert and oriented x3  Pain: none      LABS:    Blood Cultures:                           7.5    0.17  )-----------( 28       ( 11 Oct 2019 07:11 )             22.3         Mean Cell Volume : 94.5 fl  Mean Cell Hemoglobin : 31.8 pg  Mean Cell Hemoglobin Concentration : 33.6 gm/dL  Auto Neutrophil # : x  Auto Lymphocyte # : x  Auto Monocyte # : x  Auto Eosinophil # : x  Auto Basophil # : x  Auto Neutrophil % : x  Auto Lymphocyte % : x  Auto Monocyte % : x  Auto Eosinophil % : x  Auto Basophil % : x      10-11    136  |  100  |  18  ----------------------------<  98  3.7   |  26  |  1.21    Ca    8.3<L>      11 Oct 2019 07:09  Phos  3.9     10-11  Mg     1.5     10-11    TPro  5.9<L>  /  Alb  3.4  /  TBili  0.5  /  DBili  0.1  /  AST  15  /  ALT  14  /  AlkPhos  61  10-11      Mg 1.5  Phos 3.9      PT/INR - ( 10 Oct 2019 06:56 )   PT: 11.1 sec;   INR: 0.97 ratio         PTT - ( 10 Oct 2019 06:56 )  PTT:30.8 sec      Uric Acid -    Meds:   Antimicrobials:   acyclovir   Oral Tab/Cap 400 milliGRAM(s) Oral every 8 hours  cefepime   IVPB 2000 milliGRAM(s) IV Intermittent every 8 hours  clotrimazole Lozenge 1 Lozenge Oral five times a day  voriconazole 200 milliGRAM(s) Oral every 12 hours      Heme / Onc:   heparin  Infusion 339 Unit(s)/Hr IV Continuous <Continuous>      GI:  docusate sodium 100 milliGRAM(s) Oral three times a day  pantoprazole    Tablet 40 milliGRAM(s) Oral before breakfast  senna 2 Tablet(s) Oral at bedtime  sodium bicarbonate Mouth Rinse 10 milliLiter(s) Swish and Spit five times a day  ursodiol Capsule 300 milliGRAM(s) Oral two times a day with meals      Cardiovascular:   enalapril 2.5 milliGRAM(s) Oral daily  metoprolol succinate ER 12.5 milliGRAM(s) Oral two times a day  tamsulosin 0.4 milliGRAM(s) Oral at bedtime      Immunologic:   immune   globulin 10% (GAMMAGARD) IVPB 30 Gram(s) IV Intermittent <User Schedule>      Other medications:   acetaminophen   Tablet .. 650 milliGRAM(s) Oral every 6 hours  acetaminophen   Tablet .. 650 milliGRAM(s) Oral <User Schedule>  ALPRAZolam 0.5 milliGRAM(s) Oral at bedtime  aprepitant 80 milliGRAM(s) Oral every 24 hours  Biotene Dry Mouth Oral Rinse 5 milliLiter(s) Swish and Spit five times a day  chlorhexidine 4% Liquid 1 Application(s) Topical <User Schedule>  diphenhydrAMINE   Injectable 25 milliGRAM(s) IV Push <User Schedule>  finasteride 5 milliGRAM(s) Oral daily  folic acid 1 milliGRAM(s) Oral daily  multivitamin 1 Tablet(s) Oral daily  ondansetron Injectable 8 milliGRAM(s) IV Push every 8 hours  palifermin Injectable  (eMAR) 4860 MICROGram(s) IV Push every 24 hours  sertraline 50 milliGRAM(s) Oral at bedtime  sodium chloride 0.45% 1000 milliLiter(s) IV Continuous <Continuous>  sodium chloride 0.45%. 1000 milliLiter(s) IV Continuous <Continuous>  sodium chloride 0.9%. 1000 milliLiter(s) IV Continuous <Continuous>      PRN:   acetaminophen   Tablet .. 650 milliGRAM(s) Oral every 6 hours PRN  diphenhydrAMINE   Injectable 25 milliGRAM(s) IV Push every 4 hours PRN  metoclopramide Injectable 10 milliGRAM(s) IV Push every 6 hours PRN  sodium chloride 0.9% lock flush 10 milliLiter(s) IV Push every 1 hour PRN      A/P:  59 year old male with a history of AML  Pre :  Allogeneic PBSCT day +2  s/p HPC transplant yesterday 10/9 - continue transplant hydration for 24 hours post infusion of cells   Strict I&O, daily weights, prn diuresis   10/10 neutropenic fever, cipro changed to cefepime. Follow up cultures.   Cardiology following. Continue current plan.   FU UA and BC from 10/10  Cytoxan 10/12,13    1. Infectious Disease:   acyclovir   Oral Tab/Cap 400 milliGRAM(s) Oral every 8 hours  ciprofloxacin     Tablet 500 milliGRAM(s) Oral every 12 hours  clotrimazole Lozenge 1 Lozenge Oral five times a day  voriconazole 200 milliGRAM(s) Oral every 12 hours    2. VOD Prophylaxis: Actigall, Glutamine, Heparin (dosed at 100 units / kg / day)     3. GI Prophylaxis:   pantoprazole    Tablet 40 milliGRAM(s) Oral before breakfast    4. Mouthcare - NS / NaHCO3 rinses, Mycelex, Biotene; Skin care     5. GVHD prophylaxis   Tacro, MMF to start on day + 5   CTX days + 3, +4   TBI day -1     6. Transfuse & replete electrolytes prn   hypomagnesemia-replace mg    7. IV hydration, daily weights, strict I&O, prn diuresis     8. PO intake as tolerated, nutrition follow up as needed, MVI, folic acid     9. Antiemetics, anti-diarrhea medications:   metoclopramide Injectable 10 milliGRAM(s) IV Push every 6 hours PRN  ondansetron Injectable 8 milliGRAM(s) IV Push every 8 hours  aprepitant 80 milliGRAM(s) Oral every 24 hours    10. OOB as tolerated, physical therapy consult if needed     11. Monitor coags / fibrinogen 2x week, vitamin K as needed     12. Monitor closely for clinical changes, monitor for fevers     13. Emotional support provided, plan of care discussed and questions addressed     14. Patient education done regarding plan of care, restrictions and discharge planning     15. Continue regular social work input     I have written the above note for Dr. Gaytan who performed service with me in the room.   Lourdes Nugent NP-C ([phone])    I have seen and examined patient with NP, I agree with above note as scribed.

## 2019-10-11 NOTE — CHART NOTE - NSCHARTNOTEFT_GEN_A_CORE
MEDICINE NP    Notified by RN patient with temperature 38.3. Seen and examined at bedside. Patient is awake, NAD. Denies HA, CP, SOB, cough, N/V, or abd pain.    VITAL SIGNS:  T(C): 37.6 (10-11-19 @ 05:14), Max: 38.3 (10-11-19 @ 00:25)  HR: 108 (10-11-19 @ 05:14) (94 - 108)  BP: 103/67 (10-11-19 @ 05:14) (97/65 - 120/74)  RR: 18 (10-11-19 @ 05:14) (18 - 18)  SpO2: 95% (10-11-19 @ 05:14) (95% - 99%)  Wt(kg): --      LABORATORY:                          7.8    0.74  )-----------( 47       ( 10 Oct 2019 06:56 )             23.0       10-10    140  |  104  |  24<H>  ----------------------------<  95  3.6   |  27  |  1.12    Ca    8.2<L>      10 Oct 2019 07:05  Phos  3.4     10-10  Mg     1.7     10-10    TPro  5.9<L>  /  Alb  3.4  /  TBili  0.4  /  DBili  x   /  AST  21  /  ALT  18  /  AlkPhos  58  10-10          MICROBIOLOGY:     MEDICATIONS  (STANDING):  acetaminophen   Tablet .. 650 milliGRAM(s) Oral every 6 hours  acetaminophen   Tablet .. 650 milliGRAM(s) Oral <User Schedule>  acyclovir   Oral Tab/Cap 400 milliGRAM(s) Oral every 8 hours  ALPRAZolam 0.5 milliGRAM(s) Oral at bedtime  aprepitant 80 milliGRAM(s) Oral every 24 hours  Biotene Dry Mouth Oral Rinse 5 milliLiter(s) Swish and Spit five times a day  cefepime   IVPB 2000 milliGRAM(s) IV Intermittent every 8 hours  chlorhexidine 4% Liquid 1 Application(s) Topical <User Schedule>  clotrimazole Lozenge 1 Lozenge Oral five times a day  diphenhydrAMINE   Injectable 25 milliGRAM(s) IV Push <User Schedule>  docusate sodium 100 milliGRAM(s) Oral three times a day  enalapril 2.5 milliGRAM(s) Oral daily  finasteride 5 milliGRAM(s) Oral daily  folic acid 1 milliGRAM(s) Oral daily  heparin  Infusion 339 Unit(s)/Hr (3.39 mL/Hr) IV Continuous <Continuous>  immune   globulin 10% (GAMMAGARD) IVPB 30 Gram(s) IV Intermittent <User Schedule>  metoprolol succinate ER 12.5 milliGRAM(s) Oral two times a day  multivitamin 1 Tablet(s) Oral daily  ondansetron Injectable 8 milliGRAM(s) IV Push every 8 hours  palifermin Injectable  (eMAR) 4860 MICROGram(s) IV Push every 24 hours  pantoprazole    Tablet 40 milliGRAM(s) Oral before breakfast  senna 2 Tablet(s) Oral at bedtime  sertraline 50 milliGRAM(s) Oral at bedtime  sodium bicarbonate Mouth Rinse 10 milliLiter(s) Swish and Spit five times a day  sodium chloride 0.45% 1000 milliLiter(s) (296 mL/Hr) IV Continuous <Continuous>  sodium chloride 0.45%. 1000 milliLiter(s) (200 mL/Hr) IV Continuous <Continuous>  sodium chloride 0.9%. 1000 milliLiter(s) (20 mL/Hr) IV Continuous <Continuous>  tamsulosin 0.4 milliGRAM(s) Oral at bedtime  ursodiol Capsule 300 milliGRAM(s) Oral two times a day with meals  voriconazole 200 milliGRAM(s) Oral every 12 hours        RADIOLOGY:          PHYSICAL EXAM:        Respiratory: diminished LS at base bilaterally. No wheezing, rhonchi, or crackles.    Cardiovascular: S1 S2+    Gastrointestinal: BS X4 active. soft. nontender.          ASSESSMENT/PLAN:   HPI:  Mr. Delong is a 59 year old male, with previously diagnosed acute myeloid leukemia. s/p induction chemotherapy with Daunorubicin/Cytarabine and  HIDAC consolidation chemotherapy, now admitted for Haplo-identical stem cell transplant from son. Other history includes non-ischemic cardiomyopathy (recent EF 25% 9/19/19), COLLEEN, DVT/PE, HTN, Factor V Leiden and amputation of right toe due to osteomyelitis.     Patient was initially being followed by [facility] for factor V Leiden on Xarelto. Labs were normal at check up. Had increased SOB and had repeat labs done and was told to go to ER. Patient went to [facility] hospital due to insurance reasons. He was diagnosed with AML and underwent treated with standard induction chemotherapy daunorubicin/cytarabine 7+3. Day 14 BM bx showed persistent disease and patient was reinduced with 5+2 at which time he developed a wound on the right tow with concern for osteomyelitis as well as right cheek fungal infection with aspergillosis and neutropenic fevers of 105. After hospitalization, patient was discharged to Subacute rehab for 3weeks. After discharge from rehab due to insurance reasons, the patient [place] [facility] [place] Coelho. BM bx showed a CR and plan was made for consolidation chemo with HiDAc.    Patient was admitted for chemotherapy on 7/11/19 and chemotherapy was held for osteomyelitis of right great toe and was seen by ID and Podiatry and the patient was discharged home on 6 weeks of Ceftriaxone (to complete on 8/21/19). After follow up with podiatry the decision was made to readmit for amputation and then proceeded with Cycle 1 of HIDAC due to risk of relapse.    Patient admitted today, s/p Kepivance x 3 days 9/30, 10/1/ and 10/2. Patient only reports mild itching to forehead (from Kepivance).  Denies any pain, nausea, vomiting, diarrhea, chest pain, SOB. Last dose of Xarelto for PE/DVT was 9/25. Took all other regular AM meds this morning. (03 Oct 2019 12:48)        1) Neutropenic Fever  -tylenol and cooling measures prn for pyrexia  -BC x2, UA/UC done within 2 days and result still pending  -CXR ordered  -c/w current iv ATB's  -cont assess and monitor.   -F/U primary team in AM

## 2019-10-11 NOTE — CHART NOTE - NSCHARTNOTEFT_GEN_A_CORE
Nutrition Follow Up Note  Patient seen for nutrition follow up.     Source:     Diet :     Patient reports:     PO intake :     Source for PO intake:     Enteral /Parenteral Nutrition:     Weights:   % Weight Change    Pertinent Medications:  Pertinent Labs:    Skin per nursing documentation:   Edema:    Estimated Needs:   [ ] no change since previous assessment  [ ] recalculated:     Previous Nutrition Diagnosis:   Nutrition Diagnosis is:    New Nutrition Diagnosis:  Related to:    As evidenced by:      Interventions:     Recommend  1)    Monitoring and Evaluation:     Continue to monitor Nutritional intake, Tolerance to diet prescription, weights, labs, skin integrity    RD remains available upon request and will follow up per protocol Patient seen for nutrition follow up. Pt with AML s/p haploidentical allogeneic PBSCT Day +2.     Source: Information obtained from patient & previous RD notes.    Diet : Regular, Glutasolve 1 packet daily    Pt denies N+V, GI distress; last BM 10/10.     PO intake : Pt reports good intake >75% past few days despite moderately decreased appetite as pt motivated to maintain weight/nutritional status. Pt reports use of cold snack/meal alternatives; reports consumption of protein rich foods with each meal.   Nursing flowsheet documents varied intake past 3 days, ~20-75%. Pt enjoys Shake It Up program; documented 25% intake of shake.     Source for PO intake: Pt, nursing flowsheet.    Weights, standing: (10/10/19) 179 lbs, (10/09/19) 173.7 lbs, (10/08/19) 175.2 lbs, Admission (10/03/19) 179.6 lbs  Pt with ranging weights in 170s, likely caused by fluid shifts and not indicative of true weight loss/gain.  Noted pt current body weight close to documented admission weight.     Pertinent Medications: colace, folic acid, MVI, zofran, protonix, kepivance  Pertinent Labs: (10/11/19) Ca 8.3 <L>, Mg 1.5 <L>     Skin per nursing documentation: Intact  Edema: None noted at this time.    Estimated Needs:   [X] recalculated:   Protein, 1.4-1.6 gm/kg at current body weight 81.4 kg (179 lbs, 10/10/19)  113-130 gm protein/day    Previous Nutrition Diagnosis: [X] ongoing  Nutrition Diagnosis is: [X] Predicted suboptimal energy intake  New Nutrition Diagnosis: [X] Not applicable    Interventions:   Recommend  1) Discussed importance of continued po intake of meals, milkshakes & cold snack alternatives as desired/tolerated to maintain weight and nutritional status.   2) Recommended intake of protein rich foods first to maximize nutrient intake.   3) Reminded pt RD to remain available as needed to assist.    Monitoring and Evaluation:   Monitor po intake, tolerance to diet prescription, weights, nutrition related labs, GI distress/ BMs.  Follow up per protocol.    Letty Kramer, Dietetic Intern Pager #[phone] Patient seen for nutrition follow up. Pt with AML s/p haploidentical allogeneic PBSCT Day +2.     Source: Information obtained from patient & previous RD notes.    Diet : Regular, Glutasolve 1 packet daily    Pt denies N+V, GI distress; last BM 10/10.     PO intake : Pt reports good intake >75% past few days despite moderately decreased appetite as pt motivated to maintain weight/nutritional status. Pt reports use of cold snack/meal alternatives; reports consumption of protein rich foods with each meal.   Nursing flowsheet documents varied intake past 3 days, ~20-75%. Pt enjoys Shake It Up program; documented 25% intake of shake.     Source for PO intake: Pt, nursing flowsheet.    Weights, standing: (10/10/19) 179 lbs, (10/09/19) 173.7 lbs, (10/08/19) 175.2 lbs, Admission (10/03/19) 179.6 lbs  Pt with ranging weights in 170s, likely caused by fluid shifts and not indicative of true weight loss/gain.  Noted pt current body weight close to documented admission weight.     Pertinent Medications: colace, senna, folic acid, MVI, zofran, reglan, emend protonix, kepivance  Pertinent Labs: (10/11/19) Ca 8.3 <L>, Mg 1.5 <L>     Skin per nursing documentation: Intact  Edema: None noted at this time.    Estimated Needs:   [X] recalculated:   Protein, 1.4-1.6 gm/kg at current body weight 81.4 kg (179 lbs, 10/10/19)  113-130 gm protein/day    Previous Nutrition Diagnosis: [X] ongoing  Nutrition Diagnosis is: [X] Predicted suboptimal energy intake  New Nutrition Diagnosis: [X] Not applicable    Interventions:   Recommend  1) Discussed importance of continued po intake of meals, milkshakes & cold snack alternatives as desired/tolerated to maintain weight and nutritional status.   2) Recommended intake of protein rich foods first to maximize nutrient intake.   3) Reminded pt RD to remain available as needed to assist.    Monitoring and Evaluation:   Monitor po intake, tolerance to diet prescription, weights, nutrition related labs, GI distress/ BMs.  Follow up per protocol.    Letty Kramer, Dietetic Intern Pager #[phone]

## 2019-10-11 NOTE — PROGRESS NOTE ADULT - ATTENDING COMMENTS
59 year old male, with previously diagnosed acute myeloid leukemia. s/p induction chemotherapy with Daunorubicin/Cytarabine and  HIDAC consolidation chemotherapy, now admitted for Haplo-identical stem cell transplant from son. Conditioning regimen of Fludarabine, Cytoxan, and TBI. Other history includes non-ischemic cardiomyopathy (recent EF 25% 9/19/19), COLLEEN, DVT/PE, HTN, Factor V Leiden and amputation of right toe due to osteomyelitis.  Day + 2  Strict I&O, daily weights, prn diuresis   Renal insufficiency- monitor daily creatinine  IVIG 0.4 g/kg (ideal body weight)  for GVHD ppx. Premedication with Tylenol and benadryl  TBI on day -1   Start Zarxio on day +5,  MMF and Tacrolimus. Check Tacrolimus levels on Monday and Thursday.   Anxiolytics, antinausea, antidiarrhea medications as needed   Lasix PRN while being aggressively hydrated to avoid VOD.  Nutritional support, pain management.  hx non-ischemic CM, followed by Dr. Luke Lyn, last Echo 9/19 (EF 25%)  VOD prophylaxis - low dose heparin gtt (dosed at 100 units / kg / day), glutamine supplementation, Actigall BID   ID ppx - Bactrim DS through day -2 , Acyclovir 400mg po TID to start day -1; continue Voriconazole for h/o fungal infection  GI prophylaxis - Protonix po QD.  Kepivance for prevention of mucositis- days 0, +1, +2  transfusion / electrolyte support  Aggressive mouth care and skin care as per protocol. 59 year old male, with previously diagnosed acute myeloid leukemia. s/p induction chemotherapy with Daunorubicin/Cytarabine and  HIDAC consolidation chemotherapy, now admitted for Haplo-identical stem cell transplant from son. Conditioning regimen of Fludarabine, Cytoxan, and TBI. Other history includes non-ischemic cardiomyopathy (recent EF 25% 9/19/19), COLLEEN, DVT/PE, HTN, Factor V Leiden and amputation of right toe due to osteomyelitis.  Day + 2..follow temps if persist can dose 1 mg/kg of solumedrol  ctx day 3,4  Strict I&O, daily weights, prn diuresis   Renal insufficiency- monitor daily creatinine  IVIG 0.4 g/kg (ideal body weight)  for GVHD ppx. Premedication with Tylenol and benadryl  TBI on day -1   Start Zarxio on day +5,  MMF and Tacrolimus. Check Tacrolimus levels on Monday and Thursday.   Anxiolytics, antinausea, antidiarrhea medications as needed   Lasix PRN while being aggressively hydrated to avoid VOD.  Nutritional support, pain management.  hx non-ischemic CM, followed by Dr. Luke Lyn, last Echo 9/19 (EF 25%)  VOD prophylaxis - low dose heparin gtt (dosed at 100 units / kg / day), glutamine supplementation, Actigall BID   ID ppx - Bactrim DS through day -2 , Acyclovir 400mg po TID to start day -1; continue Voriconazole for h/o fungal infection  GI prophylaxis - Protonix po QD.  Kepivance for prevention of mucositis- days 0, +1, +2  transfusion / electrolyte support  Aggressive mouth care and skin care as per protocol. 59 year old male, with previously diagnosed acute myeloid leukemia. s/p induction chemotherapy with Daunorubicin/Cytarabine and  HIDAC consolidation chemotherapy, now admitted for Haplo-identical stem cell transplant from son. Conditioning regimen of Fludarabine, Cytoxan, and TBI. Other history includes non-ischemic cardiomyopathy (recent EF 25% 9/19/19), COLLEEN, DVT/PE, HTN, Factor V Leiden and amputation of right toe due to osteomyelitis.  Day + 2..follow temps if persist can dose 1 mg/kg of solumedrol..cx sent, cefapime started  ctx day 3,4  Strict I&O, daily weights, prn diuresis   Renal insufficiency- monitor daily creatinine  IVIG 0.4 g/kg (ideal body weight)  for GVHD ppx. Premedication with Tylenol and benadryl  TBI on day -1   Start Zarxio on day +5,  MMF and Tacrolimus. Check Tacrolimus levels on Monday and Thursday.   Anxiolytics, antinausea, antidiarrhea medications as needed   Lasix PRN while being aggressively hydrated to avoid VOD.  Nutritional support, pain management.  hx non-ischemic CM, followed by Dr. Luke Lyn, last Echo 9/19 (EF 25%)  VOD prophylaxis - low dose heparin gtt (dosed at 100 units / kg / day), glutamine supplementation, Actigall BID   ID ppx - Bactrim DS through day -2 , Acyclovir 400mg po TID to start day -1; continue Voriconazole for h/o fungal infection  GI prophylaxis - Protonix po QD.  Kepivance for prevention of mucositis- days 0, +1, +2  transfusion / electrolyte support  Aggressive mouth care and skin care as per protocol.

## 2019-10-11 NOTE — PROGRESS NOTE ADULT - SUBJECTIVE AND OBJECTIVE BOX
[facility] - Ede Zamudio, Riki, Edgar, Gem, Tabitha Garcia  Office Number:  [phone]    Patient resting comfortably in bed in NAD.  Laying flat with no respiratory distress.  No complaints of chest pain, dyspnea, palpitations, PND, or orthopnea.  fevers overnight.    ROS: negative unless otherwise mentioned.    Telemetry:  off    MEDICATIONS  (STANDING):  acetaminophen   Tablet .. 650 milliGRAM(s) Oral every 6 hours  acetaminophen   Tablet .. 650 milliGRAM(s) Oral <User Schedule>  acyclovir   Oral Tab/Cap 400 milliGRAM(s) Oral every 8 hours  ALPRAZolam 0.5 milliGRAM(s) Oral at bedtime  aprepitant 80 milliGRAM(s) Oral every 24 hours  Biotene Dry Mouth Oral Rinse 5 milliLiter(s) Swish and Spit five times a day  cefepime   IVPB 2000 milliGRAM(s) IV Intermittent every 8 hours  chlorhexidine 4% Liquid 1 Application(s) Topical <User Schedule>  clotrimazole Lozenge 1 Lozenge Oral five times a day  diphenhydrAMINE   Injectable 25 milliGRAM(s) IV Push <User Schedule>  docusate sodium 100 milliGRAM(s) Oral three times a day  enalapril 2.5 milliGRAM(s) Oral daily  finasteride 5 milliGRAM(s) Oral daily  folic acid 1 milliGRAM(s) Oral daily  heparin  Infusion 339 Unit(s)/Hr (3.39 mL/Hr) IV Continuous <Continuous>  immune   globulin 10% (GAMMAGARD) IVPB 30 Gram(s) IV Intermittent <User Schedule>  magnesium sulfate  IVPB 2 Gram(s) IV Intermittent once  metoprolol succinate ER 12.5 milliGRAM(s) Oral two times a day  multivitamin 1 Tablet(s) Oral daily  ondansetron Injectable 8 milliGRAM(s) IV Push every 8 hours  palifermin Injectable  (eMAR) 4860 MICROGram(s) IV Push every 24 hours  pantoprazole    Tablet 40 milliGRAM(s) Oral before breakfast  senna 2 Tablet(s) Oral at bedtime  sertraline 50 milliGRAM(s) Oral at bedtime  sodium bicarbonate Mouth Rinse 10 milliLiter(s) Swish and Spit five times a day  sodium chloride 0.45% 1000 milliLiter(s) (296 mL/Hr) IV Continuous <Continuous>  sodium chloride 0.45%. 1000 milliLiter(s) (200 mL/Hr) IV Continuous <Continuous>  sodium chloride 0.9%. 1000 milliLiter(s) (20 mL/Hr) IV Continuous <Continuous>  tamsulosin 0.4 milliGRAM(s) Oral at bedtime  ursodiol Capsule 300 milliGRAM(s) Oral two times a day with meals  voriconazole 200 milliGRAM(s) Oral every 12 hours    MEDICATIONS  (PRN):  acetaminophen   Tablet .. 650 milliGRAM(s) Oral every 6 hours PRN Temp greater or equal to 38C (100.4F), Mild Pain (1 - 3)  diphenhydrAMINE   Injectable 25 milliGRAM(s) IV Push every 4 hours PRN Allergy symptoms  metoclopramide Injectable 10 milliGRAM(s) IV Push every 6 hours PRN Nausea and/or Vomiting  sodium chloride 0.9% lock flush 10 milliLiter(s) IV Push every 1 hour PRN Pre/post blood products, medications, blood draw, and to maintain line patency      Allergies    No Known Allergies    Intolerances        Vital Signs Last 24 Hrs  T(C): 37.6 (11 Oct 2019 05:14), Max: 38.3 (11 Oct 2019 00:25)  T(F): 99.7 (11 Oct 2019 05:14), Max: 100.9 (11 Oct 2019 00:25)  HR: 108 (11 Oct 2019 05:14) (94 - 108)  BP: 103/67 (11 Oct 2019 05:14) (97/65 - 120/74)  BP(mean): --  RR: 18 (11 Oct 2019 05:14) (18 - 18)  SpO2: 95% (11 Oct 2019 05:14) (95% - 99%)    I&O's Summary    10 Oct 2019 07:01  -  11 Oct 2019 07:00  --------------------------------------------------------  IN: 3113 mL / OUT: 3225 mL / NET: -112 mL        ON EXAM:    Constitutional: well-nourished, well-developed, NAD   HEENT:  MMM, sclerae anicteric, conjunctivae clear, no oral cyanosis.  Pulmonary: Non-labored, breath sounds are clear bilaterally, No wheezing, rales or rhonchi  Cardiovascular: Regular, S1 and S2.  No murmur.  No rubs, gallops or clicks  Gastrointestinal: Bowel Sounds present, soft, nontender.   Lymph: No peripheral edema.   Neurological: Alert, no focal deficits  Skin: No rashes.  Psych:  Mood & affect appropriate    LABS: All Labs Reviewed:                        7.5    0.17  )-----------( 28       ( 11 Oct 2019 07:11 )             22.3                         7.8    0.74  )-----------( 47       ( 10 Oct 2019 06:56 )             23.0                         7.8    0.67  )-----------( 41       ( 09 Oct 2019 08:18 )             23.2     11 Oct 2019 07:09    136    |  100    |  18     ----------------------------<  98     3.7     |  26     |  1.21   10 Oct 2019 07:05    140    |  104    |  24     ----------------------------<  95     3.6     |  27     |  1.12   09 Oct 2019 08:18    136    |  102    |  24     ----------------------------<  95     4.6     |  23     |  1.28     Ca    8.3        11 Oct 2019 07:09  Ca    8.2        10 Oct 2019 07:05  Ca    8.7        09 Oct 2019 08:18  Phos  3.9       11 Oct 2019 07:09  Phos  3.4       10 Oct 2019 07:05  Phos  4.2       09 Oct 2019 08:18  Mg     1.5       11 Oct 2019 07:09  Mg     1.7       10 Oct 2019 07:05  Mg     1.9       09 Oct 2019 08:18    TPro  5.9    /  Alb  3.4    /  TBili  0.5    /  DBili  0.1    /  AST  15     /  ALT  14     /  AlkPhos  61     11 Oct 2019 07:09  TPro  5.9    /  Alb  3.4    /  TBili  0.4    /  DBili  x      /  AST  21     /  ALT  18     /  AlkPhos  58     10 Oct 2019 07:05  TPro  6.1    /  Alb  3.5    /  TBili  0.4    /  DBili  <0.1   /  AST  26     /  ALT  21     /  AlkPhos  61     09 Oct 2019 08:18    PT/INR - ( 10 Oct 2019 06:56 )   PT: 11.1 sec;   INR: 0.97 ratio         PTT - ( 10 Oct 2019 06:56 )  PTT:30.8 sec      Blood Culture: Organism --  Gram Stain Blood -- Gram Stain --  Specimen Source STER [id number]  Culture-Blood --    Organism --  Gram Stain Blood -- Gram Stain --  Specimen Source STER [id number]  Culture-Blood --

## 2019-10-11 NOTE — PROGRESS NOTE ADULT - ASSESSMENT
59 year old male, with previously diagnosed acute myeloid leukemia. s/p induction chemotherapy with Daunorubicin/Cytarabine and HIDAC consolidation chemotherapy, now admitted for Haplo-identical stem cell transplant from son:    - No evidence of volume overload at present.  He is on fluids and has been getting IV Lasix prn to maintain net negative.  His recent CXR is clear.  - Continue to maintain strict I's and O's, and to monitor for signs of volume overload, which he is at risk for.  - No evidence of acute ischemia  - BP is soft overall but stable.   - Continue ACEI and BB at current doses as tolerated by BP.  He was unable to get both this morning.  - Continue heparin gtt for hx of dvt/pe and hypercoagulable state, and adjust rate per protocol. Monitor platelet count which is now <50.  - Monitor and replete lytes  - To follow while admitted

## 2019-10-11 NOTE — PROGRESS NOTE ADULT - SUBJECTIVE AND OBJECTIVE BOX
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 Palliative called to follow for symptoms post-transplant.    INTERVAL EVENTS:  10/11: d#2 post transplant, states feeling well overall, starting to have diminished appetite    ADVANCE DIRECTIVES:    DNR [ ]  MOLST  [ ]    Living Will  [ ]   DECISION MAKER(s):  [X ] Health Care Proxy(s)  [ ] Surrogate(s)  [ ] Guardian           Name(s) and Contact(s): Ashlyn (significant other)    BASELINE (I)ADL(s) (prior to admission):  [place]: [X ]Total  [ ] Moderate [ ]Dependent    Allergies    No Known Allergies    Intolerances    MEDICATIONS  (STANDING):  acetaminophen   Tablet .. 650 milliGRAM(s) Oral every 6 hours  acetaminophen   Tablet .. 650 milliGRAM(s) Oral <User Schedule>  acyclovir   Oral Tab/Cap 400 milliGRAM(s) Oral every 8 hours  ALPRAZolam 0.5 milliGRAM(s) Oral at bedtime  aprepitant 80 milliGRAM(s) Oral every 24 hours  Biotene Dry Mouth Oral Rinse 5 milliLiter(s) Swish and Spit five times a day  cefepime   IVPB 2000 milliGRAM(s) IV Intermittent every 8 hours  chlorhexidine 4% Liquid 1 Application(s) Topical <User Schedule>  clotrimazole Lozenge 1 Lozenge Oral five times a day  diphenhydrAMINE   Injectable 25 milliGRAM(s) IV Push <User Schedule>  docusate sodium 100 milliGRAM(s) Oral three times a day  enalapril 2.5 milliGRAM(s) Oral daily  finasteride 5 milliGRAM(s) Oral daily  folic acid 1 milliGRAM(s) Oral daily  heparin  Infusion 339 Unit(s)/Hr (3.39 mL/Hr) IV Continuous <Continuous>  immune   globulin 10% (GAMMAGARD) IVPB 30 Gram(s) IV Intermittent <User Schedule>  metoprolol succinate ER 12.5 milliGRAM(s) Oral two times a day  multivitamin 1 Tablet(s) Oral daily  ondansetron Injectable 8 milliGRAM(s) IV Push every 8 hours  pantoprazole    Tablet 40 milliGRAM(s) Oral before breakfast  senna 2 Tablet(s) Oral at bedtime  sertraline 50 milliGRAM(s) Oral at bedtime  sodium bicarbonate Mouth Rinse 10 milliLiter(s) Swish and Spit five times a day  sodium chloride 0.45% 1000 milliLiter(s) (296 mL/Hr) IV Continuous <Continuous>  sodium chloride 0.45%. 1000 milliLiter(s) (200 mL/Hr) IV Continuous <Continuous>  sodium chloride 0.9%. 1000 milliLiter(s) (20 mL/Hr) IV Continuous <Continuous>  tamsulosin 0.4 milliGRAM(s) Oral at bedtime  ursodiol Capsule 300 milliGRAM(s) Oral two times a day with meals  voriconazole 200 milliGRAM(s) Oral every 12 hours    MEDICATIONS  (PRN):  acetaminophen   Tablet .. 650 milliGRAM(s) Oral every 6 hours PRN Temp greater or equal to 38C (100.4F), Mild Pain (1 - 3)  diphenhydrAMINE   Injectable 25 milliGRAM(s) IV Push every 4 hours PRN Allergy symptoms  metoclopramide Injectable 10 milliGRAM(s) IV Push every 6 hours PRN Nausea and/or Vomiting  sodium chloride 0.9% lock flush 10 milliLiter(s) IV Push every 1 hour PRN Pre/post blood products, medications, blood draw, and to maintain line patency      PRESENT SYMPTOMS:   Source if other than patient:  [ ]Family   [ ]Team     Pain (Impact on QOL):    Location -         Minimal acceptable level (0-10 scale):                    Aggravating factors -  Quality -  Radiation -  Severity (0-10 scale) -    Timing -    PAIN AD Score:     [email or web address] (press ctrl +  left click to view)    Dyspnea:                           [ ]Mild [ ]Moderate [ ]Severe  Anxiety:                             [ ]Mild [ ]Moderate [ ]Severe  Fatigue:                             [ ]Mild [ ]Moderate [ ]Severe  Nausea:                             [ ]Mild [ ]Moderate [ ]Severe  Loss of appetite:              [ ]Mild [ ]Moderate [ ]Severe  Constipation:                    [ ]Mild [ ]Moderate [ ]Severe    Other Symptoms:  [X ]All other review of systems negative   [ ]Unable to obtain due to poor mentation     Karnofsky Performance Score/Palliative Performance Status Version 2:  70+%    PHYSICAL EXAM:  Vital Signs Last 24 Hrs  T(C): 37.6 (11 Oct 2019 16:37), Max: 38.3 (11 Oct 2019 00:25)  T(F): 99.6 (11 Oct 2019 16:37), Max: 100.9 (11 Oct 2019 00:25)  HR: 120 (11 Oct 2019 16:37) (94 - 120)  BP: 120/79 (11 Oct 2019 16:37) (103/67 - 120/79)  BP(mean): --  RR: 18 (11 Oct 2019 16:37) (18 - 18)  SpO2: 97% (11 Oct 2019 16:37) (95% - 99%)        GENERAL:  [X ]Alert  [ ]Oriented x   [ ]Lethargic  [ ]Cachexia  [ ]Unarousable  [X ]Verbal  [ ]Non-Verbal    Behavioral:   [ ] Anxiety  [ ] Delirium [ ] Agitation [ ] Other    HEENT:  [X ]Normal   [ ]Dry mouth   [ ]ET Tube/Trach  [ ]Oral lesions    PULMONARY:   [X ]Clear [ ]Tachypnea  [ ]Audible excessive secretions   [ ]Rhonchi        [ ]Right [ ]Left [ ]Bilateral  [ ]Crackles        [ ]Right [ ]Left [ ]Bilateral  [ ]Wheezing     [ ]Right [ ]Left [ ]Bilateral    CARDIOVASCULAR:    [ X]Regular [ ]Irregular [ ]Tachy  [ ]Maximino [ ]Murmur [ ]Other    GASTROINTESTINAL:  [ X]Soft  [ ]Distended   [X ]+BS  [ ]Non tender [ ]Tender  [ ]PEG [ ]OGT/ NGT  Last BM: 10/3/19    GENITOURINARY:  [ ]Normal [ ] Incontinent   [ ]Oliguria/Anuria   [ ]Padilla    MUSCULOSKELETAL:   [ ]Normal   [ ]Weakness  [ ]Bed/Wheelchair bound [ ]Edema    NEUROLOGIC:   [ X]No focal deficits  [ ] Cognitive impairment  [ ] Dysphagia [ ]Dysarthria [ ] Paresis [ ]Other     SKIN:   [X ]Normal   [ ]Pressure ulcer(s)  [ ]Rash    CRITICAL CARE:  [ ] Shock Present  [ ]Septic [ ]Cardiogenic [ ]Neurologic [ ]Hypovolemic  [ ]  Vasopressors [ ]  Inotropes   [ ] Respiratory failure present  [ ] Acute  [ ] Chronic [ ] Hypoxic  [ ] Hypercarbic [ ] Other  [ ] Other organ failure     LABS: reviewed                                   7.5    0.17  )-----------( 28       ( 11 Oct 2019 07:11 )             22.3     10-11    136  |  100  |  18  ----------------------------<  98  3.7   |  26  |  1.21    Ca    8.3<L>      11 Oct 2019 07:09  Phos  3.9     10-11  Mg     1.5     10-11          RADIOLOGY & ADDITIONAL STUDIES: no imaging for review    PROTEIN CALORIE MALNUTRITION:   [ ] PPSV2 < or = to 30% [ ] significant weight loss  [ ] poor nutritional intake [ ] catabolic state [ ] anasarca     Albumin, Serum: 3.4 g/dL (10-04-19 @ 07:34)  Artificial Nutrition [ ]     REFERRALS:   [ ]Chaplaincy  [ ] Hospice  [ ]Child Life  [ ]Social Work  [ ]Case management [ ]Holistic Therapy     Goals of Care Discussion Document:     ........ ....... ...... ..... .... ... .. .  Jairo Benjamin MD  Palliative Medicine  office: [phone] | [phone]  pager: [phone]

## 2019-10-12 LAB
ALBUMIN SERPL ELPH-MCNC: 3.1 G/DL — LOW (ref 3.3–5)
ALP SERPL-CCNC: 62 U/L — SIGNIFICANT CHANGE UP (ref 40–120)
ALT FLD-CCNC: 12 U/L — SIGNIFICANT CHANGE UP (ref 10–45)
ANION GAP SERPL CALC-SCNC: 10 MMOL/L — SIGNIFICANT CHANGE UP (ref 5–17)
AST SERPL-CCNC: 11 U/L — SIGNIFICANT CHANGE UP (ref 10–40)
BILIRUB SERPL-MCNC: 0.3 MG/DL — SIGNIFICANT CHANGE UP (ref 0.2–1.2)
BUN SERPL-MCNC: 17 MG/DL — SIGNIFICANT CHANGE UP (ref 7–23)
CALCIUM SERPL-MCNC: 7.7 MG/DL — LOW (ref 8.4–10.5)
CHLORIDE SERPL-SCNC: 104 MMOL/L — SIGNIFICANT CHANGE UP (ref 96–108)
CO2 SERPL-SCNC: 23 MMOL/L — SIGNIFICANT CHANGE UP (ref 22–31)
CREAT SERPL-MCNC: 1.18 MG/DL — SIGNIFICANT CHANGE UP (ref 0.5–1.3)
GLUCOSE SERPL-MCNC: 102 MG/DL — HIGH (ref 70–99)
HCT VFR BLD CALC: 20.8 % — CRITICAL LOW (ref 39–50)
HGB BLD-MCNC: 6.9 G/DL — CRITICAL LOW (ref 13–17)
LDH SERPL L TO P-CCNC: 135 U/L — SIGNIFICANT CHANGE UP (ref 50–242)
MAGNESIUM SERPL-MCNC: 1.7 MG/DL — SIGNIFICANT CHANGE UP (ref 1.6–2.6)
MCHC RBC-ENTMCNC: 31.7 PG — SIGNIFICANT CHANGE UP (ref 27–34)
MCHC RBC-ENTMCNC: 33.2 GM/DL — SIGNIFICANT CHANGE UP (ref 32–36)
MCV RBC AUTO: 95.4 FL — SIGNIFICANT CHANGE UP (ref 80–100)
NRBC # BLD: 0 /100 WBCS — SIGNIFICANT CHANGE UP (ref 0–0)
PHOSPHATE SERPL-MCNC: 3 MG/DL — SIGNIFICANT CHANGE UP (ref 2.5–4.5)
PLATELET # BLD AUTO: 20 K/UL — CRITICAL LOW (ref 150–400)
POTASSIUM SERPL-MCNC: 3.9 MMOL/L — SIGNIFICANT CHANGE UP (ref 3.5–5.3)
POTASSIUM SERPL-SCNC: 3.9 MMOL/L — SIGNIFICANT CHANGE UP (ref 3.5–5.3)
PROT SERPL-MCNC: 5.4 G/DL — LOW (ref 6–8.3)
RBC # BLD: 2.18 M/UL — LOW (ref 4.2–5.8)
RBC # FLD: 16.5 % — HIGH (ref 10.3–14.5)
SODIUM SERPL-SCNC: 137 MMOL/L — SIGNIFICANT CHANGE UP (ref 135–145)
SP GR UR STRIP: 1.01 — LOW (ref 1.01–1.02)
WBC # BLD: 0.11 K/UL — CRITICAL LOW (ref 3.8–10.5)
WBC # FLD AUTO: 0.11 K/UL — CRITICAL LOW (ref 3.8–10.5)

## 2019-10-12 PROCEDURE — 99233 SBSQ HOSP IP/OBS HIGH 50: CPT

## 2019-10-12 PROCEDURE — 99291 CRITICAL CARE FIRST HOUR: CPT

## 2019-10-12 PROCEDURE — 71045 X-RAY EXAM CHEST 1 VIEW: CPT | Mod: 26

## 2019-10-12 PROCEDURE — 93010 ELECTROCARDIOGRAM REPORT: CPT

## 2019-10-12 RX ORDER — CYCLOPHOSPHAMIDE 100 MG
3830 VIAL (EA) INTRAVENOUS DAILY
Refills: 0 | Status: COMPLETED | OUTPATIENT
Start: 2019-10-12 | End: 2019-10-13

## 2019-10-12 RX ORDER — FUROSEMIDE 40 MG
20 TABLET ORAL ONCE
Refills: 0 | Status: COMPLETED | OUTPATIENT
Start: 2019-10-12 | End: 2019-10-12

## 2019-10-12 RX ORDER — MESNA 100 MG/ML
978 INJECTION, SOLUTION INTRAVENOUS
Refills: 0 | Status: COMPLETED | OUTPATIENT
Start: 2019-10-12 | End: 2019-10-13

## 2019-10-12 RX ORDER — MESNA 100 MG/ML
978 INJECTION, SOLUTION INTRAVENOUS
Refills: 0 | Status: DISCONTINUED | OUTPATIENT
Start: 2019-10-12 | End: 2019-10-12

## 2019-10-12 RX ORDER — FUROSEMIDE 40 MG
20 TABLET ORAL EVERY 24 HOURS
Refills: 0 | Status: COMPLETED | OUTPATIENT
Start: 2019-10-12 | End: 2019-10-13

## 2019-10-12 RX ORDER — ALPRAZOLAM 0.25 MG
0.5 TABLET ORAL AT BEDTIME
Refills: 0 | Status: DISCONTINUED | OUTPATIENT
Start: 2019-10-12 | End: 2019-10-19

## 2019-10-12 RX ADMIN — ONDANSETRON 8 MILLIGRAM(S): 8 TABLET, FILM COATED ORAL at 10:42

## 2019-10-12 RX ADMIN — CEFEPIME 100 MILLIGRAM(S): 1 INJECTION, POWDER, FOR SOLUTION INTRAMUSCULAR; INTRAVENOUS at 21:01

## 2019-10-12 RX ADMIN — Medication 5 MILLILITER(S): at 20:45

## 2019-10-12 RX ADMIN — Medication 5 MILLILITER(S): at 11:01

## 2019-10-12 RX ADMIN — SODIUM CHLORIDE 20 MILLILITER(S): 9 INJECTION INTRAMUSCULAR; INTRAVENOUS; SUBCUTANEOUS at 18:03

## 2019-10-12 RX ADMIN — URSODIOL 300 MILLIGRAM(S): 250 TABLET, FILM COATED ORAL at 08:23

## 2019-10-12 RX ADMIN — Medication 1 LOZENGE: at 20:45

## 2019-10-12 RX ADMIN — CHLORHEXIDINE GLUCONATE 1 APPLICATION(S): 213 SOLUTION TOPICAL at 11:01

## 2019-10-12 RX ADMIN — Medication 650 MILLIGRAM(S): at 22:00

## 2019-10-12 RX ADMIN — URSODIOL 300 MILLIGRAM(S): 250 TABLET, FILM COATED ORAL at 17:16

## 2019-10-12 RX ADMIN — Medication 1 LOZENGE: at 15:38

## 2019-10-12 RX ADMIN — VORICONAZOLE 200 MILLIGRAM(S): 10 INJECTION, POWDER, LYOPHILIZED, FOR SOLUTION INTRAVENOUS at 05:45

## 2019-10-12 RX ADMIN — Medication 345.75 MILLIGRAM(S): at 15:47

## 2019-10-12 RX ADMIN — Medication 1 TABLET(S): at 10:59

## 2019-10-12 RX ADMIN — Medication 20 MILLIGRAM(S): at 06:56

## 2019-10-12 RX ADMIN — Medication 1 LOZENGE: at 08:22

## 2019-10-12 RX ADMIN — Medication 650 MILLIGRAM(S): at 17:14

## 2019-10-12 RX ADMIN — Medication 5 MILLILITER(S): at 15:38

## 2019-10-12 RX ADMIN — TAMSULOSIN HYDROCHLORIDE 0.4 MILLIGRAM(S): 0.4 CAPSULE ORAL at 21:01

## 2019-10-12 RX ADMIN — VORICONAZOLE 200 MILLIGRAM(S): 10 INJECTION, POWDER, LYOPHILIZED, FOR SOLUTION INTRAVENOUS at 17:15

## 2019-10-12 RX ADMIN — MESNA 239.12 MILLIGRAM(S): 100 INJECTION, SOLUTION INTRAVENOUS at 18:23

## 2019-10-12 RX ADMIN — Medication 40 MILLIGRAM(S): at 08:21

## 2019-10-12 RX ADMIN — Medication 1 LOZENGE: at 11:01

## 2019-10-12 RX ADMIN — APREPITANT 80 MILLIGRAM(S): 80 CAPSULE ORAL at 05:48

## 2019-10-12 RX ADMIN — Medication 25 MILLIGRAM(S): at 10:59

## 2019-10-12 RX ADMIN — Medication 650 MILLIGRAM(S): at 22:30

## 2019-10-12 RX ADMIN — Medication 10 MILLILITER(S): at 00:37

## 2019-10-12 RX ADMIN — Medication 12.5 MILLIGRAM(S): at 17:15

## 2019-10-12 RX ADMIN — Medication 650 MILLIGRAM(S): at 18:02

## 2019-10-12 RX ADMIN — SODIUM CHLORIDE 296 MILLILITER(S): 9 INJECTION, SOLUTION INTRAVENOUS at 04:59

## 2019-10-12 RX ADMIN — Medication 100 MILLIGRAM(S): at 05:46

## 2019-10-12 RX ADMIN — Medication 10 MILLILITER(S): at 08:22

## 2019-10-12 RX ADMIN — Medication 650 MILLIGRAM(S): at 02:00

## 2019-10-12 RX ADMIN — SERTRALINE 50 MILLIGRAM(S): 25 TABLET, FILM COATED ORAL at 21:01

## 2019-10-12 RX ADMIN — Medication 400 MILLIGRAM(S): at 05:45

## 2019-10-12 RX ADMIN — Medication 650 MILLIGRAM(S): at 11:00

## 2019-10-12 RX ADMIN — ONDANSETRON 8 MILLIGRAM(S): 8 TABLET, FILM COATED ORAL at 17:16

## 2019-10-12 RX ADMIN — CEFEPIME 100 MILLIGRAM(S): 1 INJECTION, POWDER, FOR SOLUTION INTRAMUSCULAR; INTRAVENOUS at 05:45

## 2019-10-12 RX ADMIN — HEPARIN SODIUM 3.39 UNIT(S)/HR: 5000 INJECTION INTRAVENOUS; SUBCUTANEOUS at 18:03

## 2019-10-12 RX ADMIN — MESNA 239.12 MILLIGRAM(S): 100 INJECTION, SOLUTION INTRAVENOUS at 15:33

## 2019-10-12 RX ADMIN — Medication 1 MILLIGRAM(S): at 10:58

## 2019-10-12 RX ADMIN — FINASTERIDE 5 MILLIGRAM(S): 5 TABLET, FILM COATED ORAL at 10:59

## 2019-10-12 RX ADMIN — Medication 5 MILLILITER(S): at 08:22

## 2019-10-12 RX ADMIN — Medication 650 MILLIGRAM(S): at 01:30

## 2019-10-12 RX ADMIN — CEFEPIME 100 MILLIGRAM(S): 1 INJECTION, POWDER, FOR SOLUTION INTRAMUSCULAR; INTRAVENOUS at 14:27

## 2019-10-12 RX ADMIN — PANTOPRAZOLE SODIUM 40 MILLIGRAM(S): 20 TABLET, DELAYED RELEASE ORAL at 05:45

## 2019-10-12 RX ADMIN — ONDANSETRON 8 MILLIGRAM(S): 8 TABLET, FILM COATED ORAL at 00:48

## 2019-10-12 RX ADMIN — Medication 20 MILLIGRAM(S): at 20:46

## 2019-10-12 RX ADMIN — SODIUM CHLORIDE 296 MILLILITER(S): 9 INJECTION, SOLUTION INTRAVENOUS at 06:14

## 2019-10-12 RX ADMIN — Medication 10 MILLILITER(S): at 11:01

## 2019-10-12 RX ADMIN — Medication 5 MILLILITER(S): at 00:37

## 2019-10-12 RX ADMIN — Medication 0.5 MILLIGRAM(S): at 21:11

## 2019-10-12 RX ADMIN — SODIUM CHLORIDE 296 MILLILITER(S): 9 INJECTION, SOLUTION INTRAVENOUS at 08:24

## 2019-10-12 RX ADMIN — Medication 400 MILLIGRAM(S): at 21:01

## 2019-10-12 RX ADMIN — Medication 1 LOZENGE: at 00:37

## 2019-10-12 RX ADMIN — Medication 400 MILLIGRAM(S): at 13:13

## 2019-10-12 RX ADMIN — MESNA 239.12 MILLIGRAM(S): 100 INJECTION, SOLUTION INTRAVENOUS at 21:30

## 2019-10-12 RX ADMIN — Medication 10 MILLILITER(S): at 20:45

## 2019-10-12 RX ADMIN — Medication 40 MILLIGRAM(S): at 16:09

## 2019-10-12 RX ADMIN — Medication 10 MILLILITER(S): at 15:38

## 2019-10-12 NOTE — CHART NOTE - NSCHARTNOTEFT_GEN_A_CORE
DI CONTRERAS    Notified by RN patient I & O imbalanced.  Output < Input       Interventions taken   Lasix 20mg iv x1 for fluid overload  cont assess and monitor.  f/u with am team.                      Mateus Oneil ANP-BC  Spectralink #[id number]

## 2019-10-12 NOTE — PROGRESS NOTE ADULT - ASSESSMENT
59 year old male, with previously diagnosed acute myeloid leukemia. s/p induction chemotherapy with Daunorubicin/Cytarabine and HIDAC consolidation chemotherapy, now admitted for Haplo-identical stem cell transplant from son:    - No evidence of volume overload at present.  He is on fluids and has been getting IV Lasix prn to maintain net negative.  His recent CXR is clear.  - Continue to maintain strict I's and O's, and to monitor for signs of volume overload, which he is at risk for.  - No evidence of acute ischemia  - BP is soft overall but stable.   - Continue ACEI and BB at current doses as tolerated by BP.    - Continue heparin gtt for hx of dvt/pe and hypercoagulable state, and adjust rate per protocol. Monitor platelet count which is now <50.  - Monitor and replete lytes  - To follow while admitted

## 2019-10-12 NOTE — PROGRESS NOTE ADULT - ATTENDING COMMENTS
59 year old male, with previously diagnosed acute myeloid leukemia. s/p induction chemotherapy with Daunorubicin/Cytarabine and  HIDAC consolidation chemotherapy, now admitted for Haplo-identical stem cell transplant from son. Conditioning regimen of Fludarabine, Cytoxan, and TBI. Other history includes non-ischemic cardiomyopathy (recent EF 25% 9/19/19), COLLEEN, DVT/PE, HTN, Factor V Leiden and amputation of right toe due to osteomyelitis.  Day + 2..follow temps if persist can dose 1 mg/kg of solumedrol..cx sent, cefapime started  ctx day 3,4  Strict I&O, daily weights, prn diuresis   Renal insufficiency- monitor daily creatinine  IVIG 0.4 g/kg (ideal body weight)  for GVHD ppx. Premedication with Tylenol and benadryl  TBI on day -1   Start Zarxio on day +5,  MMF and Tacrolimus. Check Tacrolimus levels on Monday and Thursday.   Anxiolytics, antinausea, antidiarrhea medications as needed   Lasix PRN while being aggressively hydrated to avoid VOD.  Nutritional support, pain management.  hx non-ischemic CM, followed by Dr. Luke Lyn, last Echo 9/19 (EF 25%)  VOD prophylaxis - low dose heparin gtt (dosed at 100 units / kg / day), glutamine supplementation, Actigall BID   ID ppx - Bactrim DS through day -2 , Acyclovir 400mg po TID to start day -1; continue Voriconazole for h/o fungal infection  GI prophylaxis - Protonix po QD.  Kepivance for prevention of mucositis- days 0, +1, +2  transfusion / electrolyte support  Aggressive mouth care and skin care as per protocol. 59 year old male, with previously diagnosed acute myeloid leukemia. s/p induction chemotherapy with Daunorubicin/Cytarabine and  HIDAC consolidation chemotherapy, now admitted for Haplo-identical stem cell transplant from son. Conditioning regimen of Fludarabine, Cytoxan, and TBI. Other history includes non-ischemic cardiomyopathy (recent EF 25% 9/19/19), COLLEEN, DVT/PE, HTN, Factor V Leiden and amputation of right toe due to osteomyelitis.  Day + 3.  Febrile likely due to haplostorm, planned for CTX today.  Follow temps if persist can dose 1 mg/kg of solumedrol..cx sent, cefepime started  ctx day 3,4  Strict I&O, daily weights, prn diuresis- lasix today post CTX.  Renal insufficiency- monitor daily creatinine  IVIG 0.4 g/kg (ideal body weight)  for GVHD ppx. Premedication with Tylenol and benadryl  TBI on day -1   Start Zarxio on day +5,  MMF and Tacrolimus. Check Tacrolimus levels on Monday and Thursday.   Anxiolytics, antinausea, antidiarrhea medications as needed   Nutritional support, pain management.  hx non-ischemic CM, followed by Dr. Luke Lyn, last Echo 9/19 (EF 25%)  VOD prophylaxis - low dose heparin gtt (dosed at 100 units / kg / day), glutamine supplementation, Actigall BID   ID ppx - Bactrim DS through day -2 , Acyclovir 400mg po TID to start day -1; continue Voriconazole for h/o fungal infection  GI prophylaxis - Protonix po QD.  Kepivance for prevention of mucositis- days 0, +1, +2  transfusion / electrolyte support  Aggressive mouth care and skin care as per protocol.

## 2019-10-12 NOTE — PROGRESS NOTE ADULT - SUBJECTIVE AND OBJECTIVE BOX
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    S: Patient seen and examined with HPC Transplant Team:   No acute complaints     Denies mouth / tongue / throat pain, dyspnea, cough, nausea, vomiting, diarrhea, abdominal pain    O: Vitals:   Vital Signs Last 24 Hrs  T(C): 37.1 (12 Oct 2019 05:43), Max: 38.2 (11 Oct 2019 18:00)  T(F): 98.8 (12 Oct 2019 05:43), Max: 100.8 (11 Oct 2019 18:00)  HR: 101 (12 Oct 2019 05:43) (90 - 125)  BP: 117/79 (12 Oct 2019 05:43) (96/51 - 120/79)  BP(mean): --  RR: 16 (12 Oct 2019 05:43) (16 - 20)  SpO2: 97% (12 Oct 2019 05:43) (95% - 98%)     Admit weight: 85.1kg    Intake / Output:   10-11 @ 07:01  -  10-12 @ 07:00  --------------------------------------------------------  IN: 4037 mL / OUT: 2050 mL / NET: 1987 mL    10-12 @ 07:01  -  10-12 @ 07:58  --------------------------------------------------------  IN: 161.7 mL / OUT: 0 mL / NET: 161.7 mL    PE:   Oropharynx: no ulceration or erythema  Oral Mucositis:      none                                                  Grade:   CVS: reg S1 S2  Lungs: CTA  Abdomen: soft, + normoactive BS, NT, ND  Extremities: No C/C/E  Gastric Mucositis:     none                                             Grade:   Intestinal Mucositis:      none                                        Grade:   Skin: no rash  TLC:  C/D/I  Neuro: Alert and oriented x3  Pain: none      Labs:         Radiology:       Meds:   Antimicrobials:   acyclovir   Oral Tab/Cap 400 milliGRAM(s) Oral every 8 hours  cefepime   IVPB 2000 milliGRAM(s) IV Intermittent every 8 hours  clotrimazole Lozenge 1 Lozenge Oral five times a day  voriconazole 200 milliGRAM(s) Oral every 12 hours      Heme / Onc:   heparin  Infusion 339 Unit(s)/Hr IV Continuous <Continuous>      GI:  docusate sodium 100 milliGRAM(s) Oral three times a day  pantoprazole    Tablet 40 milliGRAM(s) Oral before breakfast  senna 2 Tablet(s) Oral at bedtime  sodium bicarbonate Mouth Rinse 10 milliLiter(s) Swish and Spit five times a day  ursodiol Capsule 300 milliGRAM(s) Oral two times a day with meals      Cardiovascular:   enalapril 2.5 milliGRAM(s) Oral daily  furosemide   Injectable 40 milliGRAM(s) IV Push <User Schedule>  metoprolol succinate ER 12.5 milliGRAM(s) Oral two times a day  tamsulosin 0.4 milliGRAM(s) Oral at bedtime      Immunologic:   immune   globulin 10% (GAMMAGARD) IVPB 30 Gram(s) IV Intermittent <User Schedule>      Other medications:   acetaminophen   Tablet .. 650 milliGRAM(s) Oral every 6 hours  acetaminophen   Tablet .. 650 milliGRAM(s) Oral <User Schedule>  ALPRAZolam 0.5 milliGRAM(s) Oral at bedtime  aprepitant 80 milliGRAM(s) Oral every 24 hours  Biotene Dry Mouth Oral Rinse 5 milliLiter(s) Swish and Spit five times a day  chlorhexidine 4% Liquid 1 Application(s) Topical <User Schedule>  diphenhydrAMINE   Injectable 25 milliGRAM(s) IV Push <User Schedule>  finasteride 5 milliGRAM(s) Oral daily  folic acid 1 milliGRAM(s) Oral daily  multivitamin 1 Tablet(s) Oral daily  ondansetron Injectable 8 milliGRAM(s) IV Push every 8 hours  sertraline 50 milliGRAM(s) Oral at bedtime  sodium chloride 0.45% 1000 milliLiter(s) IV Continuous <Continuous>  sodium chloride 0.45%. 1000 milliLiter(s) IV Continuous <Continuous>  sodium chloride 0.9%. 1000 milliLiter(s) IV Continuous <Continuous>      PRN:   acetaminophen   Tablet .. 650 milliGRAM(s) Oral every 6 hours PRN  diphenhydrAMINE   Injectable 25 milliGRAM(s) IV Push every 4 hours PRN  metoclopramide Injectable 10 milliGRAM(s) IV Push every 6 hours PRN  sodium chloride 0.9% lock flush 10 milliLiter(s) IV Push every 1 hour PRN    A/P:  59 year old male with a history of AML  Pre :  Allogeneic PBSCT day +3  s/p HPC transplant yesterday 10/9 - continue transplant hydration for 24 hours post infusion of cells   Strict I&O, daily weights, prn diuresis   10/10 neutropenic fever, cipro changed to cefepime. Follow up cultures.   Cardiology following. Continue current plan.   FU UA and BC from 10/10  Cytoxan 10/12,13    1. Infectious Disease:   acyclovir   Oral Tab/Cap 400 milliGRAM(s) Oral every 8 hours  ciprofloxacin     Tablet 500 milliGRAM(s) Oral every 12 hours  clotrimazole Lozenge 1 Lozenge Oral five times a day  voriconazole 200 milliGRAM(s) Oral every 12 hours    2. VOD Prophylaxis: Actigall, Glutamine, Heparin (dosed at 100 units / kg / day)     3. GI Prophylaxis:   pantoprazole    Tablet 40 milliGRAM(s) Oral before breakfast    4. Mouthcare - NS / NaHCO3 rinses, Mycelex, Biotene; Skin care     5. GVHD prophylaxis   Tacro, MMF to start on day + 5   CTX days + 3, +4   TBI day -1     6. Transfuse & replete electrolytes prn   hypomagnesemia-replace mg    7. IV hydration, daily weights, strict I&O, prn diuresis     8. PO intake as tolerated, nutrition follow up as needed, MVI, folic acid     9. Antiemetics, anti-diarrhea medications:   metoclopramide Injectable 10 milliGRAM(s) IV Push every 6 hours PRN  ondansetron Injectable 8 milliGRAM(s) IV Push every 8 hours  aprepitant 80 milliGRAM(s) Oral every 24 hours    10. OOB as tolerated, physical therapy consult if needed     11. Monitor coags / fibrinogen 2x week, vitamin K as needed     12. Monitor closely for clinical changes, monitor for fevers     13. Emotional support provided, plan of care discussed and questions addressed     14. Patient education done regarding plan of care, restrictions and discharge planning     15. Continue regular social work input     I have written the above note for Dr. Coelho who performed service with me in the room.   Lourdes Nugent NP-C ([phone])    I have seen and examined patient with NP, I agree with above note as scribed. 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    S: Patient seen and examined with HPC Transplant Team:   No acute complaints     Denies mouth / tongue / throat pain, dyspnea, cough, nausea, vomiting, diarrhea, abdominal pain    O: Vitals:   Vital Signs Last 24 Hrs  T(C): 37.1 (12 Oct 2019 05:43), Max: 38.2 (11 Oct 2019 18:00)  T(F): 98.8 (12 Oct 2019 05:43), Max: 100.8 (11 Oct 2019 18:00)  HR: 101 (12 Oct 2019 05:43) (90 - 125)  BP: 117/79 (12 Oct 2019 05:43) (96/51 - 120/79)  BP(mean): --  RR: 16 (12 Oct 2019 05:43) (16 - 20)  SpO2: 97% (12 Oct 2019 05:43) (95% - 98%)    Admit weight: 85.1kg  Daily weight 82.4    Intake / Output:   10-11 @ 07:01  -  10-12 @ 07:00  --------------------------------------------------------  IN: 4037 mL / OUT: 2050 mL / NET: 1987 mL    10-12 @ 07:01  -  10-12 @ 07:58  --------------------------------------------------------  IN: 161.7 mL / OUT: 0 mL / NET: 161.7 mL    PE:   Oropharynx: no ulceration or erythema  Oral Mucositis:      none                                                  Grade:   CVS: reg S1 S2  Lungs: CTA  Abdomen: soft, + normoactive BS, NT, ND  Extremities: No C/C/E  Gastric Mucositis:     none                                             Grade:   Intestinal Mucositis:      none                                        Grade:   Skin: no rash  TLC:  C/D/I  Neuro: Alert and oriented x3  Pain: none    LABS:    Blood Cultures:                           6.9    0.11  )-----------( 20       ( 12 Oct 2019 07:13 )             20.8         Mean Cell Volume : 95.4 fl  Mean Cell Hemoglobin : 31.7 pg  Mean Cell Hemoglobin Concentration : 33.2 gm/dL  Auto Neutrophil # : x  Auto Lymphocyte # : x  Auto Monocyte # : x  Auto Eosinophil # : x  Auto Basophil # : x  Auto Neutrophil % : x  Auto Lymphocyte % : x  Auto Monocyte % : x  Auto Eosinophil % : x  Auto Basophil % : x      10-12    137  |  104  |  17  ----------------------------<  102<H>  3.9   |  23  |  1.18    Ca    7.7<L>      12 Oct 2019 07:13  Phos  3.0     10-12  Mg     1.7     10-12    TPro  5.4<L>  /  Alb  3.1<L>  /  TBili  0.3  /  DBili  x   /  AST  11  /  ALT  12  /  AlkPhos  62  10-12    Mg 1.7  Phos 3.0      Meds:   Antimicrobials:   acyclovir   Oral Tab/Cap 400 milliGRAM(s) Oral every 8 hours  cefepime   IVPB 2000 milliGRAM(s) IV Intermittent every 8 hours  clotrimazole Lozenge 1 Lozenge Oral five times a day  voriconazole 200 milliGRAM(s) Oral every 12 hours      Heme / Onc:   heparin  Infusion 339 Unit(s)/Hr IV Continuous <Continuous>      GI:  docusate sodium 100 milliGRAM(s) Oral three times a day  pantoprazole    Tablet 40 milliGRAM(s) Oral before breakfast  senna 2 Tablet(s) Oral at bedtime  sodium bicarbonate Mouth Rinse 10 milliLiter(s) Swish and Spit five times a day  ursodiol Capsule 300 milliGRAM(s) Oral two times a day with meals      Cardiovascular:   enalapril 2.5 milliGRAM(s) Oral daily  furosemide   Injectable 40 milliGRAM(s) IV Push <User Schedule>  metoprolol succinate ER 12.5 milliGRAM(s) Oral two times a day  tamsulosin 0.4 milliGRAM(s) Oral at bedtime      Immunologic:   immune   globulin 10% (GAMMAGARD) IVPB 30 Gram(s) IV Intermittent <User Schedule>      Other medications:   acetaminophen   Tablet .. 650 milliGRAM(s) Oral every 6 hours  acetaminophen   Tablet .. 650 milliGRAM(s) Oral <User Schedule>  ALPRAZolam 0.5 milliGRAM(s) Oral at bedtime  aprepitant 80 milliGRAM(s) Oral every 24 hours  Biotene Dry Mouth Oral Rinse 5 milliLiter(s) Swish and Spit five times a day  chlorhexidine 4% Liquid 1 Application(s) Topical <User Schedule>  diphenhydrAMINE   Injectable 25 milliGRAM(s) IV Push <User Schedule>  finasteride 5 milliGRAM(s) Oral daily  folic acid 1 milliGRAM(s) Oral daily  multivitamin 1 Tablet(s) Oral daily  ondansetron Injectable 8 milliGRAM(s) IV Push every 8 hours  sertraline 50 milliGRAM(s) Oral at bedtime  sodium chloride 0.45% 1000 milliLiter(s) IV Continuous <Continuous>  sodium chloride 0.45%. 1000 milliLiter(s) IV Continuous <Continuous>  sodium chloride 0.9%. 1000 milliLiter(s) IV Continuous <Continuous>      PRN:   acetaminophen   Tablet .. 650 milliGRAM(s) Oral every 6 hours PRN  diphenhydrAMINE   Injectable 25 milliGRAM(s) IV Push every 4 hours PRN  metoclopramide Injectable 10 milliGRAM(s) IV Push every 6 hours PRN  sodium chloride 0.9% lock flush 10 milliLiter(s) IV Push every 1 hour PRN    A/P:  59 year old male with a history of AML  Pre :  Allogeneic PBSCT day +3  s/p HPC transplant 10/9 - continue transplant hydration for 24 hours post infusion of cells   Strict I&O, daily weights, prn diuresis   10/10 neutropenic fever, cipro changed to cefepime. Follow up cultures.   Cardiology following. Continue current plan.   UA and BC from 10/10 (-)  Cytoxan 10/12,13    1. Infectious Disease:   acyclovir   Oral Tab/Cap 400 milliGRAM(s) Oral every 8 hours  ciprofloxacin     Tablet 500 milliGRAM(s) Oral every 12 hours  clotrimazole Lozenge 1 Lozenge Oral five times a day  voriconazole 200 milliGRAM(s) Oral every 12 hours    2. VOD Prophylaxis: Actigall, Glutamine, Heparin (dosed at 100 units / kg / day)     3. GI Prophylaxis:   pantoprazole    Tablet 40 milliGRAM(s) Oral before breakfast    4. Mouthcare - NS / NaHCO3 rinses, Mycelex, Biotene; Skin care     5. GVHD prophylaxis   Tacro, MMF to start on day + 5   CTX days + 3, +4   TBI day -1     6. Transfuse & replete electrolytes prn   Anemia-replace PRBC    7. IV hydration, daily weights, strict I&O, prn diuresis     8. PO intake as tolerated, nutrition follow up as needed, MVI, folic acid     9. Antiemetics, anti-diarrhea medications:   metoclopramide Injectable 10 milliGRAM(s) IV Push every 6 hours PRN  ondansetron Injectable 8 milliGRAM(s) IV Push every 8 hours  aprepitant 80 milliGRAM(s) Oral every 24 hours    10. OOB as tolerated, physical therapy consult if needed     11. Monitor coags / fibrinogen 2x week, vitamin K as needed     12. Monitor closely for clinical changes, monitor for fevers     13. Emotional support provided, plan of care discussed and questions addressed     14. Patient education done regarding plan of care, restrictions and discharge planning     15. Continue regular social work input     I have written the above note for Dr. Coelho who performed service with me in the room.   Lourdes Nugent NP-C ([phone])    I have seen and examined patient with NP, I agree with above note as scribed.

## 2019-10-12 NOTE — PROGRESS NOTE ADULT - SUBJECTIVE AND OBJECTIVE BOX
· Subjective and Objective:   [facility] - Ede Zamudio, Edgar Lyn, Jose Rabago Savella, [place]  Office Number:  [phone]    Patient resting comfortably in bed in NAD.  Laying flat with no respiratory distress.  No complaints of chest pain, dyspnea, palpitations, PND, or orthopnea.  fevers overnight.    ROS: negative unless otherwise mentioned.    Telemetry:  off    PAST MEDICAL & SURGICAL HISTORY:  COLLEEN (obstructive sleep apnea)  Pulmonary embolism  Deep vein thrombosis (DVT)  H/O cardiomyopathy  Factor 5 Leiden mutation, heterozygous      MEDICATIONS  (STANDING):  acetaminophen   Tablet .. 650 milliGRAM(s) Oral every 6 hours  acetaminophen   Tablet .. 650 milliGRAM(s) Oral <User Schedule>  acyclovir   Oral Tab/Cap 400 milliGRAM(s) Oral every 8 hours  ALPRAZolam 0.5 milliGRAM(s) Oral at bedtime  aprepitant 80 milliGRAM(s) Oral every 24 hours  Biotene Dry Mouth Oral Rinse 5 milliLiter(s) Swish and Spit five times a day  cefepime   IVPB 2000 milliGRAM(s) IV Intermittent every 8 hours  chlorhexidine 4% Liquid 1 Application(s) Topical <User Schedule>  clotrimazole Lozenge 1 Lozenge Oral five times a day  cyclophosphamide IVPB (eMAR) 3830 milliGRAM(s) IV Intermittent daily  diphenhydrAMINE   Injectable 25 milliGRAM(s) IV Push <User Schedule>  docusate sodium 100 milliGRAM(s) Oral three times a day  enalapril 2.5 milliGRAM(s) Oral daily  finasteride 5 milliGRAM(s) Oral daily  folic acid 1 milliGRAM(s) Oral daily  furosemide   Injectable 40 milliGRAM(s) IV Push <User Schedule>  furosemide   Injectable 20 milliGRAM(s) IV Push every 24 hours  heparin  Infusion 339 Unit(s)/Hr (3.39 mL/Hr) IV Continuous <Continuous>  immune   globulin 10% (GAMMAGARD) IVPB 30 Gram(s) IV Intermittent <User Schedule>  mesna IVPB (eMAR) 978 milliGRAM(s) IV Intermittent every 3 hours  metoprolol succinate ER 12.5 milliGRAM(s) Oral two times a day  multivitamin 1 Tablet(s) Oral daily  ondansetron Injectable 8 milliGRAM(s) IV Push every 8 hours  pantoprazole    Tablet 40 milliGRAM(s) Oral before breakfast  senna 2 Tablet(s) Oral at bedtime  sertraline 50 milliGRAM(s) Oral at bedtime  sodium bicarbonate Mouth Rinse 10 milliLiter(s) Swish and Spit five times a day  sodium chloride 0.45% 1000 milliLiter(s) (296 mL/Hr) IV Continuous <Continuous>  sodium chloride 0.45%. 1000 milliLiter(s) (200 mL/Hr) IV Continuous <Continuous>  sodium chloride 0.9%. 1000 milliLiter(s) (20 mL/Hr) IV Continuous <Continuous>  tamsulosin 0.4 milliGRAM(s) Oral at bedtime  ursodiol Capsule 300 milliGRAM(s) Oral two times a day with meals  voriconazole 200 milliGRAM(s) Oral every 12 hours      Allergies    No Known Allergies    Intolerances                              6.9    0.11  )-----------( 20       ( 12 Oct 2019 07:13 )             20.8       10-    137  |  104  |  17  ----------------------------<  102<H>  3.9   |  23  |  1.18    Ca    7.7<L>      12 Oct 2019 07:13  Phos  3.0     10-12  Mg     1.7     10-12    TPro  5.4<L>  /  Alb  3.1<L>  /  TBili  0.3  /  DBili  x   /  AST  11  /  ALT  12  /  AlkPhos  62  10-12      LIVER FUNCTIONS - ( 12 Oct 2019 07:13 )  Alb: 3.1 g/dL / Pro: 5.4 g/dL / ALK PHOS: 62 U/L / ALT: 12 U/L / AST: 11 U/L / GGT: x                                   Daily     Daily Weight in k.4 (12 Oct 2019 09:53)    I&O's Summary    11 Oct 2019 07:01  -  12 Oct 2019 07:00  --------------------------------------------------------  IN: 4037 mL / OUT: 2050 mL / NET: 1987 mL    12 Oct 2019 07:01  -  12 Oct 2019 12:24  --------------------------------------------------------  IN: 1548.2 mL / OUT: 1700 mL / NET: -151.8 mL        Vital Signs Last 24 Hrs  T(C): 37.4 (12 Oct 2019 10:41), Max: 38.2 (11 Oct 2019 18:00)  T(F): 99.3 (12 Oct 2019 10:41), Max: 100.8 (11 Oct 2019 18:00)  HR: 98 (12 Oct 2019 10:41) (90 - 125)  BP: 124/67 (12 Oct 2019 10:41) (96/51 - 124/67)  BP(mean): --  RR: 18 (12 Oct 2019 10:41) (16 - 20)  SpO2: 99% (12 Oct 2019 10:41) (95% - 99%)    PHYSICAL EXAM:   · Constitutional	Well-developed, well nourished  · Eyes	EOMI; PERRL; no drainage or redness  · ENMT	No oral lesions; no gross abnormalities  · Neck	No bruits; no thyromegaly or nodules  · Respiratory	Normal breath sounds b/l, No RRW  · Cardiovascular	Regular rate & rhythm, normal S1, S2; no murmurs, gallops or rubs; no S3, S4  · Gastrointestinal	Soft, non-tender, no hepatosplenomegaly, normal bowel sounds  · Extremities	No cyanosis, clubbing or edema  · Vascular	Equal and normal pulses (carotid, femoral, dorsalis pedis)  · Neurological	Alert & oriented; no sensory, motor or coordination deficits, normal reflexes

## 2019-10-13 LAB
ALBUMIN SERPL ELPH-MCNC: 3.3 G/DL — SIGNIFICANT CHANGE UP (ref 3.3–5)
ALP SERPL-CCNC: 68 U/L — SIGNIFICANT CHANGE UP (ref 40–120)
ALT FLD-CCNC: 17 U/L — SIGNIFICANT CHANGE UP (ref 10–45)
ANION GAP SERPL CALC-SCNC: 12 MMOL/L — SIGNIFICANT CHANGE UP (ref 5–17)
AST SERPL-CCNC: 18 U/L — SIGNIFICANT CHANGE UP (ref 10–40)
BILIRUB SERPL-MCNC: 0.5 MG/DL — SIGNIFICANT CHANGE UP (ref 0.2–1.2)
BUN SERPL-MCNC: 15 MG/DL — SIGNIFICANT CHANGE UP (ref 7–23)
CALCIUM SERPL-MCNC: 8.3 MG/DL — LOW (ref 8.4–10.5)
CHLORIDE SERPL-SCNC: 99 MMOL/L — SIGNIFICANT CHANGE UP (ref 96–108)
CO2 SERPL-SCNC: 23 MMOL/L — SIGNIFICANT CHANGE UP (ref 22–31)
CREAT SERPL-MCNC: 1.15 MG/DL — SIGNIFICANT CHANGE UP (ref 0.5–1.3)
GLUCOSE SERPL-MCNC: 121 MG/DL — HIGH (ref 70–99)
HCT VFR BLD CALC: 23.4 % — LOW (ref 39–50)
HGB BLD-MCNC: 8 G/DL — LOW (ref 13–17)
LDH SERPL L TO P-CCNC: 159 U/L — SIGNIFICANT CHANGE UP (ref 50–242)
MAGNESIUM SERPL-MCNC: 1.2 MG/DL — LOW (ref 1.6–2.6)
MCHC RBC-ENTMCNC: 30.9 PG — SIGNIFICANT CHANGE UP (ref 27–34)
MCHC RBC-ENTMCNC: 34.2 GM/DL — SIGNIFICANT CHANGE UP (ref 32–36)
MCV RBC AUTO: 90.3 FL — SIGNIFICANT CHANGE UP (ref 80–100)
NRBC # BLD: 0 /100 WBCS — SIGNIFICANT CHANGE UP (ref 0–0)
PHOSPHATE SERPL-MCNC: 2.4 MG/DL — LOW (ref 2.5–4.5)
PLATELET # BLD AUTO: 14 K/UL — CRITICAL LOW (ref 150–400)
POTASSIUM SERPL-MCNC: 3.3 MMOL/L — LOW (ref 3.5–5.3)
POTASSIUM SERPL-SCNC: 3.3 MMOL/L — LOW (ref 3.5–5.3)
PROT SERPL-MCNC: 6 G/DL — SIGNIFICANT CHANGE UP (ref 6–8.3)
RBC # BLD: 2.59 M/UL — LOW (ref 4.2–5.8)
RBC # FLD: 18.4 % — HIGH (ref 10.3–14.5)
SODIUM SERPL-SCNC: 134 MMOL/L — LOW (ref 135–145)
SP GR UR STRIP: 1.01 — LOW (ref 1.01–1.02)
WBC # BLD: <0.1 K/UL — CRITICAL LOW (ref 3.8–10.5)
WBC # FLD AUTO: <0.1 K/UL — CRITICAL LOW (ref 3.8–10.5)

## 2019-10-13 PROCEDURE — 99232 SBSQ HOSP IP/OBS MODERATE 35: CPT

## 2019-10-13 PROCEDURE — 93010 ELECTROCARDIOGRAM REPORT: CPT

## 2019-10-13 PROCEDURE — 99291 CRITICAL CARE FIRST HOUR: CPT

## 2019-10-13 RX ORDER — MESNA 100 MG/ML
978 INJECTION, SOLUTION INTRAVENOUS
Refills: 0 | Status: COMPLETED | OUTPATIENT
Start: 2019-10-13 | End: 2019-10-14

## 2019-10-13 RX ORDER — MAGNESIUM SULFATE 500 MG/ML
2 VIAL (ML) INJECTION ONCE
Refills: 0 | Status: COMPLETED | OUTPATIENT
Start: 2019-10-13 | End: 2019-10-13

## 2019-10-13 RX ORDER — ACETAMINOPHEN 500 MG
1000 TABLET ORAL ONCE
Refills: 0 | Status: DISCONTINUED | OUTPATIENT
Start: 2019-10-13 | End: 2019-11-01

## 2019-10-13 RX ADMIN — Medication 10 MILLILITER(S): at 00:00

## 2019-10-13 RX ADMIN — Medication 1 LOZENGE: at 16:23

## 2019-10-13 RX ADMIN — PANTOPRAZOLE SODIUM 40 MILLIGRAM(S): 20 TABLET, DELAYED RELEASE ORAL at 05:14

## 2019-10-13 RX ADMIN — Medication 1 TABLET(S): at 12:47

## 2019-10-13 RX ADMIN — Medication 1 LOZENGE: at 08:21

## 2019-10-13 RX ADMIN — Medication 5 MILLILITER(S): at 12:45

## 2019-10-13 RX ADMIN — Medication 10 MILLILITER(S): at 08:21

## 2019-10-13 RX ADMIN — Medication 5 MILLILITER(S): at 08:21

## 2019-10-13 RX ADMIN — MESNA 239.12 MILLIGRAM(S): 100 INJECTION, SOLUTION INTRAVENOUS at 21:16

## 2019-10-13 RX ADMIN — SERTRALINE 50 MILLIGRAM(S): 25 TABLET, FILM COATED ORAL at 21:56

## 2019-10-13 RX ADMIN — Medication 10 MILLILITER(S): at 16:23

## 2019-10-13 RX ADMIN — Medication 25 MILLIGRAM(S): at 10:40

## 2019-10-13 RX ADMIN — VORICONAZOLE 200 MILLIGRAM(S): 10 INJECTION, POWDER, LYOPHILIZED, FOR SOLUTION INTRAVENOUS at 05:17

## 2019-10-13 RX ADMIN — Medication 650 MILLIGRAM(S): at 18:22

## 2019-10-13 RX ADMIN — Medication 12.5 MILLIGRAM(S): at 18:22

## 2019-10-13 RX ADMIN — Medication 345.75 MILLIGRAM(S): at 14:24

## 2019-10-13 RX ADMIN — Medication 40 MILLIGRAM(S): at 08:22

## 2019-10-13 RX ADMIN — MESNA 239.12 MILLIGRAM(S): 100 INJECTION, SOLUTION INTRAVENOUS at 18:13

## 2019-10-13 RX ADMIN — Medication 10 MILLILITER(S): at 12:46

## 2019-10-13 RX ADMIN — URSODIOL 300 MILLIGRAM(S): 250 TABLET, FILM COATED ORAL at 08:22

## 2019-10-13 RX ADMIN — SODIUM CHLORIDE 20 MILLILITER(S): 9 INJECTION INTRAMUSCULAR; INTRAVENOUS; SUBCUTANEOUS at 01:32

## 2019-10-13 RX ADMIN — Medication 250 MILLIMOLE(S): at 12:49

## 2019-10-13 RX ADMIN — CEFEPIME 100 MILLIGRAM(S): 1 INJECTION, POWDER, FOR SOLUTION INTRAMUSCULAR; INTRAVENOUS at 21:58

## 2019-10-13 RX ADMIN — CEFEPIME 100 MILLIGRAM(S): 1 INJECTION, POWDER, FOR SOLUTION INTRAMUSCULAR; INTRAVENOUS at 14:33

## 2019-10-13 RX ADMIN — TAMSULOSIN HYDROCHLORIDE 0.4 MILLIGRAM(S): 0.4 CAPSULE ORAL at 21:56

## 2019-10-13 RX ADMIN — FINASTERIDE 5 MILLIGRAM(S): 5 TABLET, FILM COATED ORAL at 12:48

## 2019-10-13 RX ADMIN — Medication 5 MILLILITER(S): at 00:00

## 2019-10-13 RX ADMIN — HEPARIN SODIUM 3.39 UNIT(S)/HR: 5000 INJECTION INTRAVENOUS; SUBCUTANEOUS at 01:31

## 2019-10-13 RX ADMIN — Medication 400 MILLIGRAM(S): at 14:34

## 2019-10-13 RX ADMIN — SODIUM CHLORIDE 296 MILLILITER(S): 9 INJECTION, SOLUTION INTRAVENOUS at 01:30

## 2019-10-13 RX ADMIN — Medication 50 GRAM(S): at 10:40

## 2019-10-13 RX ADMIN — MESNA 239.12 MILLIGRAM(S): 100 INJECTION, SOLUTION INTRAVENOUS at 15:00

## 2019-10-13 RX ADMIN — Medication 40 MILLIGRAM(S): at 18:16

## 2019-10-13 RX ADMIN — Medication 400 MILLIGRAM(S): at 05:14

## 2019-10-13 RX ADMIN — Medication 650 MILLIGRAM(S): at 18:29

## 2019-10-13 RX ADMIN — CEFEPIME 100 MILLIGRAM(S): 1 INJECTION, POWDER, FOR SOLUTION INTRAMUSCULAR; INTRAVENOUS at 05:17

## 2019-10-13 RX ADMIN — Medication 1 LOZENGE: at 00:00

## 2019-10-13 RX ADMIN — Medication 1 MILLIGRAM(S): at 12:48

## 2019-10-13 RX ADMIN — APREPITANT 80 MILLIGRAM(S): 80 CAPSULE ORAL at 05:17

## 2019-10-13 RX ADMIN — VORICONAZOLE 200 MILLIGRAM(S): 10 INJECTION, POWDER, LYOPHILIZED, FOR SOLUTION INTRAVENOUS at 18:18

## 2019-10-13 RX ADMIN — Medication 1 LOZENGE: at 12:46

## 2019-10-13 RX ADMIN — Medication 400 MILLIGRAM(S): at 21:56

## 2019-10-13 RX ADMIN — ONDANSETRON 8 MILLIGRAM(S): 8 TABLET, FILM COATED ORAL at 18:17

## 2019-10-13 RX ADMIN — Medication 20 MILLIGRAM(S): at 20:20

## 2019-10-13 RX ADMIN — ONDANSETRON 8 MILLIGRAM(S): 8 TABLET, FILM COATED ORAL at 00:02

## 2019-10-13 RX ADMIN — URSODIOL 300 MILLIGRAM(S): 250 TABLET, FILM COATED ORAL at 18:19

## 2019-10-13 RX ADMIN — Medication 5 MILLILITER(S): at 16:22

## 2019-10-13 RX ADMIN — Medication 1 LOZENGE: at 20:14

## 2019-10-13 RX ADMIN — MESNA 239.12 MILLIGRAM(S): 100 INJECTION, SOLUTION INTRAVENOUS at 03:30

## 2019-10-13 RX ADMIN — ONDANSETRON 8 MILLIGRAM(S): 8 TABLET, FILM COATED ORAL at 10:25

## 2019-10-13 RX ADMIN — Medication 5 MILLILITER(S): at 20:14

## 2019-10-13 RX ADMIN — Medication 10 MILLILITER(S): at 20:14

## 2019-10-13 RX ADMIN — Medication 0.5 MILLIGRAM(S): at 21:57

## 2019-10-13 RX ADMIN — MESNA 239.12 MILLIGRAM(S): 100 INJECTION, SOLUTION INTRAVENOUS at 00:30

## 2019-10-13 NOTE — PROVIDER CONTACT NOTE (CRITICAL VALUE NOTIFICATION) - SITUATION
pt. had been running fevers, bld. cultures yesterday 10/12/19 at 1800H , UA was done and pt still on cefepime IV,and pt. got cytoxan yesterday

## 2019-10-13 NOTE — PROGRESS NOTE ADULT - SUBJECTIVE AND OBJECTIVE BOX
· Subjective and Objective:   [facility] - Ede Zamudio, Edgar Lyn, Jose Rabago Sav[place], [place]  Office Number:  [phone]    Patient resting comfortably in bed in NAD.  Laying flat with no respiratory distress.  No complaints of chest pain, dyspnea, palpitations, PND, or orthopnea.  fevers overnight.    ROS: negative unless otherwise mentioned.    Telemetry:  off      PAST MEDICAL & SURGICAL HISTORY:  COLLEEN (obstructive sleep apnea)  Pulmonary embolism  Deep vein thrombosis (DVT)  H/O cardiomyopathy  Factor 5 Leiden mutation, heterozygous      MEDICATIONS  (STANDING):  acetaminophen   Tablet .. 650 milliGRAM(s) Oral every 6 hours  acetaminophen   Tablet .. 650 milliGRAM(s) Oral <User Schedule>  acyclovir   Oral Tab/Cap 400 milliGRAM(s) Oral every 8 hours  ALPRAZolam 0.5 milliGRAM(s) Oral at bedtime  aprepitant 80 milliGRAM(s) Oral every 24 hours  Biotene Dry Mouth Oral Rinse 5 milliLiter(s) Swish and Spit five times a day  cefepime   IVPB 2000 milliGRAM(s) IV Intermittent every 8 hours  chlorhexidine 4% Liquid 1 Application(s) Topical <User Schedule>  clotrimazole Lozenge 1 Lozenge Oral five times a day  cyclophosphamide IVPB (eMAR) 3830 milliGRAM(s) IV Intermittent daily  diphenhydrAMINE   Injectable 25 milliGRAM(s) IV Push <User Schedule>  docusate sodium 100 milliGRAM(s) Oral three times a day  enalapril 2.5 milliGRAM(s) Oral daily  finasteride 5 milliGRAM(s) Oral daily  folic acid 1 milliGRAM(s) Oral daily  furosemide   Injectable 40 milliGRAM(s) IV Push <User Schedule>  furosemide   Injectable 20 milliGRAM(s) IV Push every 24 hours  heparin  Infusion 339 Unit(s)/Hr (3.39 mL/Hr) IV Continuous <Continuous>  immune   globulin 10% (GAMMAGARD) IVPB 30 Gram(s) IV Intermittent <User Schedule>  magnesium sulfate  IVPB 2 Gram(s) IV Intermittent once  mesna IVPB (eMAR) 978 milliGRAM(s) IV Intermittent every 3 hours  metoprolol succinate ER 12.5 milliGRAM(s) Oral two times a day  multivitamin 1 Tablet(s) Oral daily  ondansetron Injectable 8 milliGRAM(s) IV Push every 8 hours  pantoprazole    Tablet 40 milliGRAM(s) Oral before breakfast  senna 2 Tablet(s) Oral at bedtime  sertraline 50 milliGRAM(s) Oral at bedtime  sodium bicarbonate Mouth Rinse 10 milliLiter(s) Swish and Spit five times a day  sodium chloride 0.45% 1000 milliLiter(s) (296 mL/Hr) IV Continuous <Continuous>  sodium chloride 0.45%. 1000 milliLiter(s) (200 mL/Hr) IV Continuous <Continuous>  sodium chloride 0.9%. 1000 milliLiter(s) (20 mL/Hr) IV Continuous <Continuous>  sodium phosphate IVPB 15 milliMole(s) IV Intermittent once  tamsulosin 0.4 milliGRAM(s) Oral at bedtime  ursodiol Capsule 300 milliGRAM(s) Oral two times a day with meals  voriconazole 200 milliGRAM(s) Oral every 12 hours      Allergies    No Known Allergies    Intolerances                              8.0    <0.10 )-----------( 14       ( 13 Oct 2019 07:02 )             23.4       10-13    134<L>  |  99  |  15  ----------------------------<  121<H>  3.3<L>   |  23  |  1.15    Ca    8.3<L>      13 Oct 2019 07:00  Phos  2.4     10-13  Mg     1.2     10-13    TPro  6.0  /  Alb  3.3  /  TBili  0.5  /  DBili  x   /  AST  18  /  ALT  17  /  AlkPhos  68  10-13      LIVER FUNCTIONS - ( 13 Oct 2019 07:00 )  Alb: 3.3 g/dL / Pro: 6.0 g/dL / ALK PHOS: 68 U/L / ALT: 17 U/L / AST: 18 U/L / GGT: x                                   Daily     Daily Weight in k.8 (13 Oct 2019 09:28)    I&O's Summary    12 Oct 2019 07:01  -  13 Oct 2019 07:00  --------------------------------------------------------  IN: 7952.8 mL / OUT: 8950 mL / NET: -997.2 mL        Vital Signs Last 24 Hrs  T(C): 37.3 (13 Oct 2019 07:25), Max: 38.8 (13 Oct 2019 04:19)  T(F): 99.1 (13 Oct 2019 07:25), Max: 101.8 (13 Oct 2019 04:19)  HR: 111 (13 Oct 2019 05:23) (84 - 117)  BP: 100/63 (13 Oct 2019 05:23) (96/58 - 150/89)  BP(mean): --  RR: 20 (13 Oct 2019 05:23) (18 - 22)  SpO2: 93% (13 Oct 2019 05:23) (93% - 99%)    PHYSICAL EXAM:   · Constitutional	Well-developed, well nourished  · Eyes	EOMI; PERRL; no drainage or redness  · ENMT	No oral lesions; no gross abnormalities  · Neck	No bruits; no thyromegaly or nodules  · Respiratory	Normal breath sounds b/l, No RRW  · Cardiovascular	Regular rate & rhythm, normal S1, S2; no murmurs, gallops or rubs; no S3, S4  · Gastrointestinal	Soft, non-tender, no hepatosplenomegaly, normal bowel sounds  · Extremities	No cyanosis, clubbing or edema  · Vascular	Equal and normal pulses (carotid, femoral, dorsalis pedis)  · Neurological	Alert & oriented; no sensory, motor or coordination deficits, normal reflexes

## 2019-10-13 NOTE — PROGRESS NOTE ADULT - ATTENDING COMMENTS
59 year old male, with previously diagnosed acute myeloid leukemia. s/p induction chemotherapy with Daunorubicin/Cytarabine and  HIDAC consolidation chemotherapy, now admitted for Haplo-identical stem cell transplant from son. Conditioning regimen of Fludarabine, Cytoxan, and TBI. Other history includes non-ischemic cardiomyopathy (recent EF 25% 9/19/19), COLLEEN, DVT/PE, HTN, Factor V Leiden and amputation of right toe due to osteomyelitis.  Day + 3.  Febrile likely due to haplostorm, planned for CTX today.  Follow temps if persist can dose 1 mg/kg of solumedrol..cx sent, cefepime started  ctx day 3,4  Strict I&O, daily weights, prn diuresis- lasix today post CTX.  Renal insufficiency- monitor daily creatinine  IVIG 0.4 g/kg (ideal body weight)  for GVHD ppx. Premedication with Tylenol and benadryl  TBI on day -1   Start Zarxio on day +5,  MMF and Tacrolimus. Check Tacrolimus levels on Monday and Thursday.   Anxiolytics, antinausea, antidiarrhea medications as needed   Nutritional support, pain management.  hx non-ischemic CM, followed by Dr. Luke Lyn, last Echo 9/19 (EF 25%)  VOD prophylaxis - low dose heparin gtt (dosed at 100 units / kg / day), glutamine supplementation, Actigall BID   ID ppx - Bactrim DS through day -2 , Acyclovir 400mg po TID to start day -1; continue Voriconazole for h/o fungal infection  GI prophylaxis - Protonix po QD.  Kepivance for prevention of mucositis- days 0, +1, +2  transfusion / electrolyte support  Aggressive mouth care and skin care as per protocol. 59 year old male, with previously diagnosed acute myeloid leukemia. s/p induction chemotherapy with Daunorubicin/Cytarabine and HIDAC consolidation chemotherapy, now admitted for Haplo-identical stem cell transplant from son. Conditioning regimen of Fludarabine, Cytoxan, and TBI. Other history includes non-ischemic cardiomyopathy (recent EF 25% 9/19/19), COLLEEN, DVT/PE, HTN, Factor V Leiden and amputation of right toe due to osteomyelitis.  Day + 4.  Febrile likely due to haplostorm, planned for CTX 2/2 today.  Follow temps if persist can dose 1 mg/kg of solumedrol.  Blood/urine cultures from 10/10 negative.  Continue cefepime/voriconazole for now along with acyclovir prophylaxis.  If continues, can consider adding IV vanco.  Strict I&O, daily weights, prn diuresis- lasix today post CTX.  Renal insufficiency- monitor daily creatinine, normal today.   Start Zarxio on day +5,  MMF and Tacrolimus. Check Tacrolimus levels on Monday and Thursday.   Anxiolytics, antinausea, antidiarrhea medications as needed   Nutritional support.  Hx non-ischemic CM, followed by Dr. Luke Lyn, last Echo 9/19 (EF 25%).  Monitor for fluid overload.  VOD prophylaxis - low dose heparin gtt (dosed at 100 units / kg / day), glutamine supplementation, Actigall BID   GI prophylaxis - Protonix po QD.  Transfusion / electrolyte support  Aggressive mouth care and skin care as per protocol.

## 2019-10-13 NOTE — PROGRESS NOTE ADULT - SUBJECTIVE AND OBJECTIVE BOX
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    S: Patient seen and examined with HPC Transplant Team:   No acute complaints     Denies mouth / tongue / throat pain, dyspnea, cough, nausea, vomiting, diarrhea, abdominal pain    O: Vitals:   Vital Signs Last 24 Hrs  T(C): 38.3 (13 Oct 2019 05:23), Max: 38.8 (13 Oct 2019 04:19)  T(F): 101 (13 Oct 2019 05:23), Max: 101.8 (13 Oct 2019 04:19)  HR: 111 (13 Oct 2019 05:23) (84 - 117)  BP: 100/63 (13 Oct 2019 05:23) (96/58 - 150/89)  BP(mean): --  RR: 20 (13 Oct 2019 05:23) (18 - 22)  SpO2: 93% (13 Oct 2019 05:23) (93% - 99%)    Admit weight: 85.1kg  Daily     Daily Weight in k.4 (12 Oct 2019 09:53)    Intake / Output:   10-12 @ 07:01  -  10-13 @ 07:00  --------------------------------------------------------  IN: 7952.8 mL / OUT: 8950 mL / NET: -997.2 mL    PE:   Oropharynx: no ulceration or erythema  Oral Mucositis:      none                                                  Grade:   CVS: reg S1 S2  Lungs: CTA  Abdomen: soft, + normoactive BS, NT, ND  Extremities: No C/C/E  Gastric Mucositis:     none                                             Grade:   Intestinal Mucositis:      none                                        Grade:   Skin: no rash  TLC:  C/D/I  Neuro: Alert and oriented x3  Pain: none      Labs:       Meds:   Antimicrobials:   acyclovir   Oral Tab/Cap 400 milliGRAM(s) Oral every 8 hours  cefepime   IVPB 2000 milliGRAM(s) IV Intermittent every 8 hours  clotrimazole Lozenge 1 Lozenge Oral five times a day  voriconazole 200 milliGRAM(s) Oral every 12 hours      Heme / Onc:   cyclophosphamide IVPB (eMAR) 3830 milliGRAM(s) IV Intermittent daily  heparin  Infusion 339 Unit(s)/Hr IV Continuous <Continuous>      GI:  docusate sodium 100 milliGRAM(s) Oral three times a day  pantoprazole    Tablet 40 milliGRAM(s) Oral before breakfast  senna 2 Tablet(s) Oral at bedtime  sodium bicarbonate Mouth Rinse 10 milliLiter(s) Swish and Spit five times a day  ursodiol Capsule 300 milliGRAM(s) Oral two times a day with meals      Cardiovascular:   enalapril 2.5 milliGRAM(s) Oral daily  furosemide   Injectable 40 milliGRAM(s) IV Push <User Schedule>  furosemide   Injectable 20 milliGRAM(s) IV Push every 24 hours  metoprolol succinate ER 12.5 milliGRAM(s) Oral two times a day  tamsulosin 0.4 milliGRAM(s) Oral at bedtime      Immunologic:   immune   globulin 10% (GAMMAGARD) IVPB 30 Gram(s) IV Intermittent <User Schedule>      Other medications:   acetaminophen   Tablet .. 650 milliGRAM(s) Oral every 6 hours  acetaminophen   Tablet .. 650 milliGRAM(s) Oral <User Schedule>  ALPRAZolam 0.5 milliGRAM(s) Oral at bedtime  aprepitant 80 milliGRAM(s) Oral every 24 hours  Biotene Dry Mouth Oral Rinse 5 milliLiter(s) Swish and Spit five times a day  chlorhexidine 4% Liquid 1 Application(s) Topical <User Schedule>  diphenhydrAMINE   Injectable 25 milliGRAM(s) IV Push <User Schedule>  finasteride 5 milliGRAM(s) Oral daily  folic acid 1 milliGRAM(s) Oral daily  multivitamin 1 Tablet(s) Oral daily  ondansetron Injectable 8 milliGRAM(s) IV Push every 8 hours  sertraline 50 milliGRAM(s) Oral at bedtime  sodium chloride 0.45% 1000 milliLiter(s) IV Continuous <Continuous>  sodium chloride 0.45%. 1000 milliLiter(s) IV Continuous <Continuous>  sodium chloride 0.9%. 1000 milliLiter(s) IV Continuous <Continuous>      PRN:   acetaminophen   Tablet .. 650 milliGRAM(s) Oral every 6 hours PRN  acetaminophen  IVPB .. 1000 milliGRAM(s) IV Intermittent once PRN  diphenhydrAMINE   Injectable 25 milliGRAM(s) IV Push every 4 hours PRN  metoclopramide Injectable 10 milliGRAM(s) IV Push every 6 hours PRN  sodium chloride 0.9% lock flush 10 milliLiter(s) IV Push every 1 hour PRN      A/P:  59 year old male with a history of AML  Pre :  Allogeneic PBSCT day +4  s/p HPC transplant 10/9 - continue transplant hydration for 24 hours post infusion of cells   Strict I&O, daily weights, prn diuresis   10/10 neutropenic fever, cipro changed to cefepime. Follow up cultures.   Cardiology following. Continue current plan.   UA and BC from 10/10 (-)  Cytoxan 10/12,    1. Infectious Disease:   acyclovir   Oral Tab/Cap 400 milliGRAM(s) Oral every 8 hours  ciprofloxacin     Tablet 500 milliGRAM(s) Oral every 12 hours  clotrimazole Lozenge 1 Lozenge Oral five times a day  voriconazole 200 milliGRAM(s) Oral every 12 hours    2. VOD Prophylaxis: Actigall, Glutamine, Heparin (dosed at 100 units / kg / day)     3. GI Prophylaxis:   pantoprazole    Tablet 40 milliGRAM(s) Oral before breakfast    4. Mouthcare - NS / NaHCO3 rinses, Mycelex, Biotene; Skin care     5. GVHD prophylaxis   Tacro, MMF to start on day + 5   CTX days + 3, +4   TBI day -1     6. Transfuse & replete electrolytes prn   Anemia-replace PRBC    7. IV hydration, daily weights, strict I&O, prn diuresis     8. PO intake as tolerated, nutrition follow up as needed, MVI, folic acid     9. Antiemetics, anti-diarrhea medications:   metoclopramide Injectable 10 milliGRAM(s) IV Push every 6 hours PRN  ondansetron Injectable 8 milliGRAM(s) IV Push every 8 hours  aprepitant 80 milliGRAM(s) Oral every 24 hours    10. OOB as tolerated, physical therapy consult if needed     11. Monitor coags / fibrinogen 2x week, vitamin K as needed     12. Monitor closely for clinical changes, monitor for fevers     13. Emotional support provided, plan of care discussed and questions addressed     14. Patient education done regarding plan of care, restrictions and discharge planning     15. Continue regular social work input     I have written the above note for Dr. Coelho who performed service with me in the room.   Lourdes Nugent NP-C ([phone])    I have seen and examined patient with NP, I agree with above note as scribed. 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  Feels fatigued, depressed appetite.      Denies mouth / tongue / throat pain, dyspnea, cough, nausea, vomiting, diarrhea, abdominal pain    O: Vitals:   Vital Signs Last 24 Hrs  T(C): 38.3 (13 Oct 2019 05:23), Max: 38.8 (13 Oct 2019 04:19)  T(F): 101 (13 Oct 2019 05:23), Max: 101.8 (13 Oct 2019 04:19)  HR: 111 (13 Oct 2019 05:23) (84 - 117)  BP: 100/63 (13 Oct 2019 05:23) (96/58 - 150/89)  BP(mean): --  RR: 20 (13 Oct 2019 05:23) (18 - 22)  SpO2: 93% (13 Oct 2019 05:23) (93% - 99%)    Admit weight: 85.1kg  Daily     Daily Weight in k.4 (12 Oct 2019 09:53)    Intake / Output:   10-12 @ 07:  -  10-13 @ 07:00  --------------------------------------------------------  IN: 7952.8 mL / OUT: 8950 mL / NET: -997.2 mL    PE:   Oropharynx: no ulceration or erythema  Oral Mucositis:       none.  Dry                                             Grade:   CVS: reg S1 S2  Lungs: CTA  Abdomen: soft, + normoactive BS, NT, ND  Extremities: No C/C/E.  Toe amputation  Gastric Mucositis:     none                                             Grade:   Intestinal Mucositis:      none                                        Grade:   Skin: no rash  TLC:  C/D/I  Neuro: Alert and oriented x3  Pain: none      Labs:       Meds:   Antimicrobials:   acyclovir   Oral Tab/Cap 400 milliGRAM(s) Oral every 8 hours  cefepime   IVPB 2000 milliGRAM(s) IV Intermittent every 8 hours  clotrimazole Lozenge 1 Lozenge Oral five times a day  voriconazole 200 milliGRAM(s) Oral every 12 hours      Heme / Onc:   cyclophosphamide IVPB (eMAR) 3830 milliGRAM(s) IV Intermittent daily  heparin  Infusion 339 Unit(s)/Hr IV Continuous <Continuous>      GI:  docusate sodium 100 milliGRAM(s) Oral three times a day  pantoprazole    Tablet 40 milliGRAM(s) Oral before breakfast  senna 2 Tablet(s) Oral at bedtime  sodium bicarbonate Mouth Rinse 10 milliLiter(s) Swish and Spit five times a day  ursodiol Capsule 300 milliGRAM(s) Oral two times a day with meals      Cardiovascular:   enalapril 2.5 milliGRAM(s) Oral daily  furosemide   Injectable 40 milliGRAM(s) IV Push <User Schedule>  furosemide   Injectable 20 milliGRAM(s) IV Push every 24 hours  metoprolol succinate ER 12.5 milliGRAM(s) Oral two times a day  tamsulosin 0.4 milliGRAM(s) Oral at bedtime      Immunologic:   immune   globulin 10% (GAMMAGARD) IVPB 30 Gram(s) IV Intermittent <User Schedule>      Other medications:   acetaminophen   Tablet .. 650 milliGRAM(s) Oral every 6 hours  acetaminophen   Tablet .. 650 milliGRAM(s) Oral <User Schedule>  ALPRAZolam 0.5 milliGRAM(s) Oral at bedtime  aprepitant 80 milliGRAM(s) Oral every 24 hours  Biotene Dry Mouth Oral Rinse 5 milliLiter(s) Swish and Spit five times a day  chlorhexidine 4% Liquid 1 Application(s) Topical <User Schedule>  diphenhydrAMINE   Injectable 25 milliGRAM(s) IV Push <User Schedule>  finasteride 5 milliGRAM(s) Oral daily  folic acid 1 milliGRAM(s) Oral daily  multivitamin 1 Tablet(s) Oral daily  ondansetron Injectable 8 milliGRAM(s) IV Push every 8 hours  sertraline 50 milliGRAM(s) Oral at bedtime  sodium chloride 0.45% 1000 milliLiter(s) IV Continuous <Continuous>  sodium chloride 0.45%. 1000 milliLiter(s) IV Continuous <Continuous>  sodium chloride 0.9%. 1000 milliLiter(s) IV Continuous <Continuous>      PRN:   acetaminophen   Tablet .. 650 milliGRAM(s) Oral every 6 hours PRN  acetaminophen  IVPB .. 1000 milliGRAM(s) IV Intermittent once PRN  diphenhydrAMINE   Injectable 25 milliGRAM(s) IV Push every 4 hours PRN  metoclopramide Injectable 10 milliGRAM(s) IV Push every 6 hours PRN  sodium chloride 0.9% lock flush 10 milliLiter(s) IV Push every 1 hour PRN      A/P:  59 year old male with a history of AML  Pre :  Allogeneic PBSCT day +4  s/p HPC transplant 10/9 - continue transplant hydration for 24 hours post infusion of cells   Strict I&O, daily weights, prn diuresis   10/10 neutropenic fever, cipro changed to cefepime. Follow up cultures.   Cardiology following. Continue current plan.   UA and BC from 10/10 (-)  Cytoxan 10/12,    1. Infectious Disease:   acyclovir   Oral Tab/Cap 400 milliGRAM(s) Oral every 8 hours  ciprofloxacin     Tablet 500 milliGRAM(s) Oral every 12 hours  clotrimazole Lozenge 1 Lozenge Oral five times a day  voriconazole 200 milliGRAM(s) Oral every 12 hours    2. VOD Prophylaxis: Actigall, Glutamine, Heparin (dosed at 100 units / kg / day)     3. GI Prophylaxis:   pantoprazole    Tablet 40 milliGRAM(s) Oral before breakfast    4. Mouthcare - NS / NaHCO3 rinses, Mycelex, Biotene; Skin care     5. GVHD prophylaxis   Tacro, MMF to start on day + 5   CTX days + 3, +4   TBI day -1     6. Transfuse & replete electrolytes prn   Anemia-replace PRBC    7. IV hydration, daily weights, strict I&O, prn diuresis     8. PO intake as tolerated, nutrition follow up as needed, MVI, folic acid     9. Antiemetics, anti-diarrhea medications:   metoclopramide Injectable 10 milliGRAM(s) IV Push every 6 hours PRN  ondansetron Injectable 8 milliGRAM(s) IV Push every 8 hours  aprepitant 80 milliGRAM(s) Oral every 24 hours    10. OOB as tolerated, physical therapy consult if needed     11. Monitor coags / fibrinogen 2x week, vitamin K as needed     12. Monitor closely for clinical changes, monitor for fevers     13. Emotional support provided, plan of care discussed and questions addressed     14. Patient education done regarding plan of care, restrictions and discharge planning     15. Continue regular social work input     I have written the above note for Dr. Coelho who performed service with me in the room.   Lourdes MART ([phone])    I have seen and examined patient with NP, I agree with above note as scribed. 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  Feels fatigued, depressed appetite.      Denies mouth / tongue / throat pain, dyspnea, cough, nausea, vomiting, diarrhea, abdominal pain    O: Vitals:   Vital Signs Last 24 Hrs  T(C): 38.3 (13 Oct 2019 05:23), Max: 38.8 (13 Oct 2019 04:19)  T(F): 101 (13 Oct 2019 05:23), Max: 101.8 (13 Oct 2019 04:19)  HR: 111 (13 Oct 2019 05:23) (84 - 117)  BP: 100/63 (13 Oct 2019 05:23) (96/58 - 150/89)  BP(mean): --  RR: 20 (13 Oct 2019 05:23) (18 - 22)  SpO2: 93% (13 Oct 2019 05:23) (93% - 99%)    Admit weight: 85.1kg  Daily   81.8kg  Daily Weight in k.4 (12 Oct 2019 09:53)    Intake / Output:   10-12 @ 07:01  -  10-13 @ 07:00  --------------------------------------------------------  IN: 7952.8 mL / OUT: 8950 mL / NET: -997.2 mL    PE:   Oropharynx: no ulceration or erythema  Oral Mucositis:       none.  Dry                                             Grade:   CVS: reg S1 S2  Lungs: CTA  Abdomen: soft, + normoactive BS, NT, ND  Extremities: No C/C/E.  Toe amputation  Gastric Mucositis:     none                                             Grade:   Intestinal Mucositis:      none                                        Grade:   Skin: no rash  TLC:  C/D/I  Neuro: Alert and oriented x3  Pain: none    LABS:    Blood Cultures:                           8.0    <0.10 )-----------( 14       ( 13 Oct 2019 07:02 )             23.4         Mean Cell Volume : 90.3 fl  Mean Cell Hemoglobin : 30.9 pg  Mean Cell Hemoglobin Concentration : 34.2 gm/dL  Auto Neutrophil # : x  Auto Lymphocyte # : x  Auto Monocyte # : x  Auto Eosinophil # : x  Auto Basophil # : x  Auto Neutrophil % : x  Auto Lymphocyte % : x  Auto Monocyte % : x  Auto Eosinophil % : x  Auto Basophil % : x      10-13    134<L>  |  99  |  15  ----------------------------<  121<H>  3.3<L>   |  23  |  1.15    Ca    8.3<L>      13 Oct 2019 07:00  Phos  2.4     10-13  Mg     1.2     10-13    TPro  6.0  /  Alb  3.3  /  TBili  0.5  /  DBili  x   /  AST  18  /  ALT  17  /  AlkPhos  68  10-13      Mg 1.2  Phos 2.4    Uric Acid -      Meds:   Antimicrobials:   acyclovir   Oral Tab/Cap 400 milliGRAM(s) Oral every 8 hours  cefepime   IVPB 2000 milliGRAM(s) IV Intermittent every 8 hours  clotrimazole Lozenge 1 Lozenge Oral five times a day  voriconazole 200 milliGRAM(s) Oral every 12 hours      Heme / Onc:   cyclophosphamide IVPB (eMAR) 3830 milliGRAM(s) IV Intermittent daily  heparin  Infusion 339 Unit(s)/Hr IV Continuous <Continuous>      GI:  docusate sodium 100 milliGRAM(s) Oral three times a day  pantoprazole    Tablet 40 milliGRAM(s) Oral before breakfast  senna 2 Tablet(s) Oral at bedtime  sodium bicarbonate Mouth Rinse 10 milliLiter(s) Swish and Spit five times a day  ursodiol Capsule 300 milliGRAM(s) Oral two times a day with meals      Cardiovascular:   enalapril 2.5 milliGRAM(s) Oral daily  furosemide   Injectable 40 milliGRAM(s) IV Push <User Schedule>  furosemide   Injectable 20 milliGRAM(s) IV Push every 24 hours  metoprolol succinate ER 12.5 milliGRAM(s) Oral two times a day  tamsulosin 0.4 milliGRAM(s) Oral at bedtime      Immunologic:   immune   globulin 10% (GAMMAGARD) IVPB 30 Gram(s) IV Intermittent <User Schedule>      Other medications:   acetaminophen   Tablet .. 650 milliGRAM(s) Oral every 6 hours  acetaminophen   Tablet .. 650 milliGRAM(s) Oral <User Schedule>  ALPRAZolam 0.5 milliGRAM(s) Oral at bedtime  aprepitant 80 milliGRAM(s) Oral every 24 hours  Biotene Dry Mouth Oral Rinse 5 milliLiter(s) Swish and Spit five times a day  chlorhexidine 4% Liquid 1 Application(s) Topical <User Schedule>  diphenhydrAMINE   Injectable 25 milliGRAM(s) IV Push <User Schedule>  finasteride 5 milliGRAM(s) Oral daily  folic acid 1 milliGRAM(s) Oral daily  multivitamin 1 Tablet(s) Oral daily  ondansetron Injectable 8 milliGRAM(s) IV Push every 8 hours  sertraline 50 milliGRAM(s) Oral at bedtime  sodium chloride 0.45% 1000 milliLiter(s) IV Continuous <Continuous>  sodium chloride 0.45%. 1000 milliLiter(s) IV Continuous <Continuous>  sodium chloride 0.9%. 1000 milliLiter(s) IV Continuous <Continuous>      PRN:   acetaminophen   Tablet .. 650 milliGRAM(s) Oral every 6 hours PRN  acetaminophen  IVPB .. 1000 milliGRAM(s) IV Intermittent once PRN  diphenhydrAMINE   Injectable 25 milliGRAM(s) IV Push every 4 hours PRN  metoclopramide Injectable 10 milliGRAM(s) IV Push every 6 hours PRN  sodium chloride 0.9% lock flush 10 milliLiter(s) IV Push every 1 hour PRN      A/P:  59 year old male with a history of AML  Pre :  Allogeneic PBSCT day +4  s/p HPC transplant 10/9 - continue transplant hydration for 24 hours post infusion of cells   Strict I&O, daily weights, prn diuresis   10/10 neutropenic fever, cipro changed to cefepime. Follow up cultures.   Cardiology following. Continue current plan.   UA and BC from 10/10 (-)  Cytoxan 10/12,13    1. Infectious Disease:   acyclovir   Oral Tab/Cap 400 milliGRAM(s) Oral every 8 hours  ciprofloxacin     Tablet 500 milliGRAM(s) Oral every 12 hours  clotrimazole Lozenge 1 Lozenge Oral five times a day  voriconazole 200 milliGRAM(s) Oral every 12 hours    2. VOD Prophylaxis: Actigall, Glutamine, Heparin (dosed at 100 units / kg / day)     3. GI Prophylaxis:   pantoprazole    Tablet 40 milliGRAM(s) Oral before breakfast    4. Mouthcare - NS / NaHCO3 rinses, Mycelex, Biotene; Skin care     5. GVHD prophylaxis   Tacro, MMF to start on day + 5   CTX days + 3, +4   TBI day -1     6. Transfuse & replete electrolytes prn   hypomagnesemia-replace mg  hypophosphatemia-replace phos  thrombocytopenia-replace plt    7. IV hydration, daily weights, strict I&O, prn diuresis     8. PO intake as tolerated, nutrition follow up as needed, MVI, folic acid     9. Antiemetics, anti-diarrhea medications:   metoclopramide Injectable 10 milliGRAM(s) IV Push every 6 hours PRN  ondansetron Injectable 8 milliGRAM(s) IV Push every 8 hours  aprepitant 80 milliGRAM(s) Oral every 24 hours    10. OOB as tolerated, physical therapy consult if needed     11. Monitor coags / fibrinogen 2x week, vitamin K as needed     12. Monitor closely for clinical changes, monitor for fevers     13. Emotional support provided, plan of care discussed and questions addressed     14. Patient education done regarding plan of care, restrictions and discharge planning     15. Continue regular social work input     I have written the above note for Dr. Coelho who performed service with me in the room.   Lourdes Nugent NP-C ([phone])    I have seen and examined patient with NP, I agree with above note as scribed.

## 2019-10-13 NOTE — PROVIDER CONTACT NOTE (CRITICAL VALUE NOTIFICATION) - RECOMMENDATIONS
notify provider,ordered IV tylenol 1000mgs IV for temp, and it was given as soon as med was available. temp .went down a little bit 38.3.will continue to monitor.

## 2019-10-14 LAB
ALBUMIN SERPL ELPH-MCNC: 3.1 G/DL — LOW (ref 3.3–5)
ALP SERPL-CCNC: 64 U/L — SIGNIFICANT CHANGE UP (ref 40–120)
ALT FLD-CCNC: 13 U/L — SIGNIFICANT CHANGE UP (ref 10–45)
ANION GAP SERPL CALC-SCNC: 12 MMOL/L — SIGNIFICANT CHANGE UP (ref 5–17)
APTT BLD: 30.5 SEC — SIGNIFICANT CHANGE UP (ref 27.5–36.3)
AST SERPL-CCNC: 9 U/L — LOW (ref 10–40)
BILIRUB DIRECT SERPL-MCNC: 0.1 MG/DL — SIGNIFICANT CHANGE UP (ref 0–0.2)
BILIRUB INDIRECT FLD-MCNC: 0.3 MG/DL — SIGNIFICANT CHANGE UP (ref 0.2–1)
BILIRUB SERPL-MCNC: 0.4 MG/DL — SIGNIFICANT CHANGE UP (ref 0.2–1.2)
BLD GP AB SCN SERPL QL: NEGATIVE — SIGNIFICANT CHANGE UP
BUN SERPL-MCNC: 16 MG/DL — SIGNIFICANT CHANGE UP (ref 7–23)
CALCIUM SERPL-MCNC: 8.4 MG/DL — SIGNIFICANT CHANGE UP (ref 8.4–10.5)
CHLORIDE SERPL-SCNC: 100 MMOL/L — SIGNIFICANT CHANGE UP (ref 96–108)
CO2 SERPL-SCNC: 23 MMOL/L — SIGNIFICANT CHANGE UP (ref 22–31)
CREAT SERPL-MCNC: 1.03 MG/DL — SIGNIFICANT CHANGE UP (ref 0.5–1.3)
CULTURE RESULTS: NO GROWTH — SIGNIFICANT CHANGE UP
FIBRINOGEN PPP-MCNC: 601 MG/DL — HIGH (ref 350–510)
GLUCOSE SERPL-MCNC: 97 MG/DL — SIGNIFICANT CHANGE UP (ref 70–99)
HCT VFR BLD CALC: 20.7 % — CRITICAL LOW (ref 39–50)
HGB BLD-MCNC: 7 G/DL — CRITICAL LOW (ref 13–17)
INR BLD: 1.19 RATIO — HIGH (ref 0.88–1.16)
LDH SERPL L TO P-CCNC: 149 U/L — SIGNIFICANT CHANGE UP (ref 50–242)
MAGNESIUM SERPL-MCNC: 1.6 MG/DL — SIGNIFICANT CHANGE UP (ref 1.6–2.6)
MCHC RBC-ENTMCNC: 30.2 PG — SIGNIFICANT CHANGE UP (ref 27–34)
MCHC RBC-ENTMCNC: 33.8 GM/DL — SIGNIFICANT CHANGE UP (ref 32–36)
MCV RBC AUTO: 89.2 FL — SIGNIFICANT CHANGE UP (ref 80–100)
NRBC # BLD: 0 /100 WBCS — SIGNIFICANT CHANGE UP (ref 0–0)
PHOSPHATE SERPL-MCNC: 3.6 MG/DL — SIGNIFICANT CHANGE UP (ref 2.5–4.5)
PLATELET # BLD AUTO: 25 K/UL — LOW (ref 150–400)
POTASSIUM SERPL-MCNC: 2.9 MMOL/L — CRITICAL LOW (ref 3.5–5.3)
POTASSIUM SERPL-MCNC: 3.5 MMOL/L — SIGNIFICANT CHANGE UP (ref 3.5–5.3)
POTASSIUM SERPL-SCNC: 2.9 MMOL/L — CRITICAL LOW (ref 3.5–5.3)
POTASSIUM SERPL-SCNC: 3.5 MMOL/L — SIGNIFICANT CHANGE UP (ref 3.5–5.3)
PROT SERPL-MCNC: 5.8 G/DL — LOW (ref 6–8.3)
PROTHROM AB SERPL-ACNC: 13.6 SEC — HIGH (ref 10–12.9)
RBC # BLD: 2.32 M/UL — LOW (ref 4.2–5.8)
RBC # FLD: 18.1 % — HIGH (ref 10.3–14.5)
RH IG SCN BLD-IMP: POSITIVE — SIGNIFICANT CHANGE UP
SODIUM SERPL-SCNC: 135 MMOL/L — SIGNIFICANT CHANGE UP (ref 135–145)
SPECIMEN SOURCE: SIGNIFICANT CHANGE UP
WBC # BLD: <0.1 K/UL — CRITICAL LOW (ref 3.8–10.5)
WBC # FLD AUTO: <0.1 K/UL — CRITICAL LOW (ref 3.8–10.5)

## 2019-10-14 PROCEDURE — 99291 CRITICAL CARE FIRST HOUR: CPT

## 2019-10-14 PROCEDURE — 99232 SBSQ HOSP IP/OBS MODERATE 35: CPT

## 2019-10-14 RX ORDER — POTASSIUM CHLORIDE 20 MEQ
20 PACKET (EA) ORAL
Refills: 0 | Status: COMPLETED | OUTPATIENT
Start: 2019-10-14 | End: 2019-10-14

## 2019-10-14 RX ADMIN — Medication 40 MILLIGRAM(S): at 07:45

## 2019-10-14 RX ADMIN — APREPITANT 80 MILLIGRAM(S): 80 CAPSULE ORAL at 05:49

## 2019-10-14 RX ADMIN — Medication 10 MILLILITER(S): at 16:56

## 2019-10-14 RX ADMIN — Medication 1 LOZENGE: at 20:29

## 2019-10-14 RX ADMIN — Medication 1 TABLET(S): at 11:46

## 2019-10-14 RX ADMIN — ONDANSETRON 8 MILLIGRAM(S): 8 TABLET, FILM COATED ORAL at 17:02

## 2019-10-14 RX ADMIN — SODIUM CHLORIDE 296 MILLILITER(S): 9 INJECTION, SOLUTION INTRAVENOUS at 16:49

## 2019-10-14 RX ADMIN — MESNA 239.12 MILLIGRAM(S): 100 INJECTION, SOLUTION INTRAVENOUS at 03:13

## 2019-10-14 RX ADMIN — VORICONAZOLE 200 MILLIGRAM(S): 10 INJECTION, POWDER, LYOPHILIZED, FOR SOLUTION INTRAVENOUS at 05:49

## 2019-10-14 RX ADMIN — Medication 400 MILLIGRAM(S): at 05:51

## 2019-10-14 RX ADMIN — Medication 5 MILLILITER(S): at 11:46

## 2019-10-14 RX ADMIN — TACROLIMUS 1 MILLIGRAM(S): 5 CAPSULE ORAL at 17:02

## 2019-10-14 RX ADMIN — Medication 0.5 MILLIGRAM(S): at 21:57

## 2019-10-14 RX ADMIN — CEFEPIME 100 MILLIGRAM(S): 1 INJECTION, POWDER, FOR SOLUTION INTRAMUSCULAR; INTRAVENOUS at 21:56

## 2019-10-14 RX ADMIN — FINASTERIDE 5 MILLIGRAM(S): 5 TABLET, FILM COATED ORAL at 11:46

## 2019-10-14 RX ADMIN — MYCOPHENOLATE MOFETIL 1000 MILLIGRAM(S): 250 CAPSULE ORAL at 20:30

## 2019-10-14 RX ADMIN — SERTRALINE 50 MILLIGRAM(S): 25 TABLET, FILM COATED ORAL at 21:56

## 2019-10-14 RX ADMIN — ONDANSETRON 8 MILLIGRAM(S): 8 TABLET, FILM COATED ORAL at 09:27

## 2019-10-14 RX ADMIN — Medication 50 MILLIEQUIVALENT(S): at 21:57

## 2019-10-14 RX ADMIN — Medication 5 MILLILITER(S): at 00:29

## 2019-10-14 RX ADMIN — Medication 400 MILLIGRAM(S): at 21:56

## 2019-10-14 RX ADMIN — Medication 1 LOZENGE: at 11:46

## 2019-10-14 RX ADMIN — Medication 50 MILLIEQUIVALENT(S): at 09:38

## 2019-10-14 RX ADMIN — HEPARIN SODIUM 3.39 UNIT(S)/HR: 5000 INJECTION INTRAVENOUS; SUBCUTANEOUS at 16:49

## 2019-10-14 RX ADMIN — Medication 1 LOZENGE: at 16:56

## 2019-10-14 RX ADMIN — Medication 12.5 MILLIGRAM(S): at 05:50

## 2019-10-14 RX ADMIN — Medication 5 MILLILITER(S): at 07:46

## 2019-10-14 RX ADMIN — Medication 400 MILLIGRAM(S): at 13:57

## 2019-10-14 RX ADMIN — Medication 1 MILLIGRAM(S): at 11:46

## 2019-10-14 RX ADMIN — CHLORHEXIDINE GLUCONATE 1 APPLICATION(S): 213 SOLUTION TOPICAL at 08:00

## 2019-10-14 RX ADMIN — Medication 10 MILLILITER(S): at 20:29

## 2019-10-14 RX ADMIN — Medication 2.5 MILLIGRAM(S): at 05:49

## 2019-10-14 RX ADMIN — VORICONAZOLE 200 MILLIGRAM(S): 10 INJECTION, POWDER, LYOPHILIZED, FOR SOLUTION INTRAVENOUS at 17:02

## 2019-10-14 RX ADMIN — CEFEPIME 100 MILLIGRAM(S): 1 INJECTION, POWDER, FOR SOLUTION INTRAMUSCULAR; INTRAVENOUS at 05:52

## 2019-10-14 RX ADMIN — SODIUM CHLORIDE 20 MILLILITER(S): 9 INJECTION INTRAMUSCULAR; INTRAVENOUS; SUBCUTANEOUS at 16:50

## 2019-10-14 RX ADMIN — Medication 5 MILLILITER(S): at 16:56

## 2019-10-14 RX ADMIN — Medication 10 MILLILITER(S): at 00:29

## 2019-10-14 RX ADMIN — TACROLIMUS 1 MILLIGRAM(S): 5 CAPSULE ORAL at 05:51

## 2019-10-14 RX ADMIN — ONDANSETRON 8 MILLIGRAM(S): 8 TABLET, FILM COATED ORAL at 01:48

## 2019-10-14 RX ADMIN — Medication 12.5 MILLIGRAM(S): at 17:01

## 2019-10-14 RX ADMIN — Medication 40 MILLIGRAM(S): at 18:34

## 2019-10-14 RX ADMIN — Medication 50 MILLIEQUIVALENT(S): at 13:08

## 2019-10-14 RX ADMIN — Medication 5 MILLILITER(S): at 20:29

## 2019-10-14 RX ADMIN — URSODIOL 300 MILLIGRAM(S): 250 TABLET, FILM COATED ORAL at 07:45

## 2019-10-14 RX ADMIN — Medication 1 LOZENGE: at 00:29

## 2019-10-14 RX ADMIN — Medication 50 MILLIEQUIVALENT(S): at 18:50

## 2019-10-14 RX ADMIN — Medication 10 MILLILITER(S): at 07:46

## 2019-10-14 RX ADMIN — MESNA 239.12 MILLIGRAM(S): 100 INJECTION, SOLUTION INTRAVENOUS at 00:29

## 2019-10-14 RX ADMIN — MYCOPHENOLATE MOFETIL 1000 MILLIGRAM(S): 250 CAPSULE ORAL at 13:57

## 2019-10-14 RX ADMIN — URSODIOL 300 MILLIGRAM(S): 250 TABLET, FILM COATED ORAL at 17:02

## 2019-10-14 RX ADMIN — TAMSULOSIN HYDROCHLORIDE 0.4 MILLIGRAM(S): 0.4 CAPSULE ORAL at 21:56

## 2019-10-14 RX ADMIN — Medication 1 LOZENGE: at 07:46

## 2019-10-14 RX ADMIN — Medication 10 MILLILITER(S): at 11:46

## 2019-10-14 RX ADMIN — CEFEPIME 100 MILLIGRAM(S): 1 INJECTION, POWDER, FOR SOLUTION INTRAMUSCULAR; INTRAVENOUS at 13:57

## 2019-10-14 RX ADMIN — Medication 50 MILLIEQUIVALENT(S): at 15:30

## 2019-10-14 RX ADMIN — PANTOPRAZOLE SODIUM 40 MILLIGRAM(S): 20 TABLET, DELAYED RELEASE ORAL at 07:26

## 2019-10-14 RX ADMIN — Medication 480 MICROGRAM(S): at 16:56

## 2019-10-14 RX ADMIN — MYCOPHENOLATE MOFETIL 1000 MILLIGRAM(S): 250 CAPSULE ORAL at 09:28

## 2019-10-14 NOTE — PROGRESS NOTE ADULT - SUBJECTIVE AND OBJECTIVE BOX
[facility]    Ede Zamudio, Riki, Edgar, Gem, Jose, Tabitha      [phone]    CHIEF COMPLAINT: Patient is a 59y old  Male who presents with a chief complaint of Allogenic stem cell transplant (14 Oct 2019 08:57)      Follow Up: nicm, bm tx    Interim history: The patient reports no new symptoms.  Denies chest discomfort and shortness of breath.  No abdominal pain.  No new neurologic symptoms.      MEDICATIONS  (STANDING):  acetaminophen   Tablet .. 650 milliGRAM(s) Oral every 6 hours  acetaminophen   Tablet .. 650 milliGRAM(s) Oral <User Schedule>  acyclovir   Oral Tab/Cap 400 milliGRAM(s) Oral every 8 hours  ALPRAZolam 0.5 milliGRAM(s) Oral at bedtime  Biotene Dry Mouth Oral Rinse 5 milliLiter(s) Swish and Spit five times a day  cefepime   IVPB 2000 milliGRAM(s) IV Intermittent every 8 hours  chlorhexidine 4% Liquid 1 Application(s) Topical <User Schedule>  clotrimazole Lozenge 1 Lozenge Oral five times a day  diphenhydrAMINE   Injectable 25 milliGRAM(s) IV Push <User Schedule>  docusate sodium 100 milliGRAM(s) Oral three times a day  enalapril 2.5 milliGRAM(s) Oral daily  filgrastim-sndz Injectable 480 MICROGram(s) SubCutaneous daily  finasteride 5 milliGRAM(s) Oral daily  folic acid 1 milliGRAM(s) Oral daily  furosemide   Injectable 40 milliGRAM(s) IV Push <User Schedule>  heparin  Infusion 339 Unit(s)/Hr (3.39 mL/Hr) IV Continuous <Continuous>  immune   globulin 10% (GAMMAGARD) IVPB 30 Gram(s) IV Intermittent <User Schedule>  lidocaine/prilocaine Cream 1 Application(s) Topical daily  metoprolol succinate ER 12.5 milliGRAM(s) Oral two times a day  multivitamin 1 Tablet(s) Oral daily  mycophenolate mofetil 1000 milliGRAM(s) Oral <User Schedule>  ondansetron Injectable 8 milliGRAM(s) IV Push every 8 hours  pantoprazole    Tablet 40 milliGRAM(s) Oral before breakfast  potassium chloride  20 mEq/100 mL IVPB 20 milliEquivalent(s) IV Intermittent every 2 hours  senna 2 Tablet(s) Oral at bedtime  sertraline 50 milliGRAM(s) Oral at bedtime  sodium bicarbonate Mouth Rinse 10 milliLiter(s) Swish and Spit five times a day  sodium chloride 0.45% 1000 milliLiter(s) (296 mL/Hr) IV Continuous <Continuous>  sodium chloride 0.45%. 1000 milliLiter(s) (200 mL/Hr) IV Continuous <Continuous>  sodium chloride 0.9%. 1000 milliLiter(s) (20 mL/Hr) IV Continuous <Continuous>  tacrolimus 1 milliGRAM(s) Oral two times a day  tamsulosin 0.4 milliGRAM(s) Oral at bedtime  ursodiol Capsule 300 milliGRAM(s) Oral two times a day with meals  voriconazole 200 milliGRAM(s) Oral every 12 hours    MEDICATIONS  (PRN):  acetaminophen   Tablet .. 650 milliGRAM(s) Oral every 6 hours PRN Temp greater or equal to 38C (100.4F), Mild Pain (1 - 3)  acetaminophen  IVPB .. 1000 milliGRAM(s) IV Intermittent once PRN Temp greater or equal to 38.5C (101.3F)  diphenhydrAMINE   Injectable 25 milliGRAM(s) IV Push every 4 hours PRN Allergy symptoms  metoclopramide Injectable 10 milliGRAM(s) IV Push every 6 hours PRN Nausea and/or Vomiting  sodium chloride 0.9% lock flush 10 milliLiter(s) IV Push every 1 hour PRN Pre/post blood products, medications, blood draw, and to maintain line patency      REVIEW OF SYSTEMS:  eye, ent, GI, , allergic, dermatologic, musculoskeletal and neurologic are negative except as described above    Vital Signs Last 24 Hrs  T(C): 36.6 (14 Oct 2019 09:56), Max: 38 (13 Oct 2019 17:38)  T(F): 97.9 (14 Oct 2019 09:56), Max: 100.4 (13 Oct 2019 17:38)  HR: 83 (14 Oct 2019 09:56) (81 - 106)  BP: 120/76 (14 Oct 2019 09:56) (103/62 - 122/70)  BP(mean): --  RR: 18 (14 Oct 2019 09:56) (18 - 18)  SpO2: 97% (14 Oct 2019 09:56) (96% - 98%)    I&O's Summary    13 Oct 2019 07:01  -  14 Oct 2019 07:00  --------------------------------------------------------  IN: 8593 mL / OUT: 8325 mL / NET: 268 mL    14 Oct 2019 07:01  -  14 Oct 2019 11:04  --------------------------------------------------------  IN: 1369.9 mL / OUT: 1800 mL / NET: -430.1 mL        Telemetry past 24h:    PHYSICAL EXAM:    Constitutional: well-nourished, well-developed, NAD   HEENT:  MMM, sclerae anicteric, conjunctivae clear, no oral cyanosis.  Pulmonary: Non-labored, breath sounds are clear bilaterally, No wheezing, rales or rhonchi  Cardiovascular: Regular, S1 and S2.  No murmur.  No rubs, gallops or clicks  Gastrointestinal: Bowel Sounds present, soft, nontender.   Lymph: No peripheral edema.   Neurological: Alert, no focal deficits  Skin: No rashes.  Psych:  Mood & affect appropriate    LABS: All Labs Reviewed:                        7.0    <0.10 )-----------( 25       ( 14 Oct 2019 06:55 )             20.7                         8.0    <0.10 )-----------( 14       ( 13 Oct 2019 07:02 )             23.4                         6.9    0.11  )-----------( 20       ( 12 Oct 2019 07:13 )             20.8     14 Oct 2019 06:55    135    |  100    |  16     ----------------------------<  97     2.9     |  23     |  1.03   13 Oct 2019 07:00    134    |  99     |  15     ----------------------------<  121    3.3     |  23     |  1.15   12 Oct 2019 07:13    137    |  104    |  17     ----------------------------<  102    3.9     |  23     |  1.18     Ca    8.4        14 Oct 2019 06:55  Ca    8.3        13 Oct 2019 07:00  Ca    7.7        12 Oct 2019 07:13  Phos  3.6       14 Oct 2019 06:55  Phos  2.4       13 Oct 2019 07:00  Phos  3.0       12 Oct 2019 07:13  Mg     1.6       14 Oct 2019 06:55  Mg     1.2       13 Oct 2019 07:00  Mg     1.7       12 Oct 2019 07:13    TPro  5.8    /  Alb  3.1    /  TBili  0.4    /  DBili  0.1    /  AST  9      /  ALT  13     /  AlkPhos  64     14 Oct 2019 06:55  TPro  6.0    /  Alb  3.3    /  TBili  0.5    /  DBili  x      /  AST  18     /  ALT  17     /  AlkPhos  68     13 Oct 2019 07:00  TPro  5.4    /  Alb  3.1    /  TBili  0.3    /  DBili  x      /  AST  11     /  ALT  12     /  AlkPhos  62     12 Oct 2019 07:13    PT/INR - ( 14 Oct 2019 06:55 )   PT: 13.6 sec;   INR: 1.19 ratio         PTT - ( 14 Oct 2019 06:55 )  PTT:30.5 sec      Blood Culture: Organism --  Gram Stain Blood -- Gram Stain --  Specimen Source .Urine  Culture-Blood --    Organism --  Gram Stain Blood -- Gram Stain --  Specimen Source .Blood  Culture-Blood --    Organism --  Gram Stain Blood -- Gram Stain --  Specimen Source .Urine  Culture-Blood --    Organism --  Gram Stain Blood -- Gram Stain --  Specimen Source .Blood  Culture-Blood --    Organism --  Gram Stain Blood -- Gram Stain --  Specimen Source STER [id number]  Culture-Blood --    Organism --  Gram Stain Blood -- Gram Stain --  Specimen Source STER [id number]  Culture-Blood --            RADIOLOGY:    EKG:    Echo:

## 2019-10-14 NOTE — PROVIDER CONTACT NOTE (CRITICAL VALUE NOTIFICATION) - ACTION/TREATMENT ORDERED:
NP aware and notified. KCL 20 meq ivss x 3 doses ordered and repeat level = 3.5. NP ordered additional  KCL 20 meq ivss x 2 doses

## 2019-10-14 NOTE — PROGRESS NOTE ADULT - ASSESSMENT
59 year old male, with previously diagnosed acute myeloid leukemia. s/p induction chemotherapy with Daunorubicin/Cytarabine and HIDAC consolidation chemotherapy, now admitted for Haplo-identical stem cell transplant from son:    - No evidence of volume overload at present.  He is on fluids and has been getting IV Lasix prn to maintain net negative.  His recent CXR is clear.  - Continue to maintain strict I's and O's, and to monitor for signs of volume overload, which he is at risk for.  - No evidence of acute ischemia  - BP is overall stable.   - Continue ACEI  and BB at current doses as tolerated by BP.    - Continue heparin gtt for hx of dvt/pe and hypercoagulable state, and adjust rate per protocol. Monitor platelet count which is now <50.  - Monitor and replete lytes  - To follow while admitted

## 2019-10-14 NOTE — PROGRESS NOTE ADULT - ATTENDING COMMENTS
59 year old male, with previously diagnosed acute myeloid leukemia. s/p induction chemotherapy with Daunorubicin/Cytarabine and HIDAC consolidation chemotherapy, now admitted for Haplo-identical stem cell transplant from son. Conditioning regimen of Fludarabine, Cytoxan, and TBI. Other history includes non-ischemic cardiomyopathy (recent EF 25% 9/19/19), COLLEEN, DVT/PE, HTN, Factor V Leiden and amputation of right toe due to osteomyelitis.  Day + 4.  Febrile likely due to haplostorm, planned for CTX 2/2 today.  Follow temps if persist can dose 1 mg/kg of solumedrol.  Blood/urine cultures from 10/10 negative.  Continue cefepime/voriconazole for now along with acyclovir prophylaxis.  If continues, can consider adding IV vanco.  Strict I&O, daily weights, prn diuresis- lasix today post CTX.  Renal insufficiency- monitor daily creatinine, normal today.   Start Zarxio on day +5,  MMF and Tacrolimus. Check Tacrolimus levels on Monday and Thursday.   Anxiolytics, antinausea, antidiarrhea medications as needed   Nutritional support.  Hx non-ischemic CM, followed by Dr. Luke Lyn, last Echo 9/19 (EF 25%).  Monitor for fluid overload.  VOD prophylaxis - low dose heparin gtt (dosed at 100 units / kg / day), glutamine supplementation, Actigall BID   GI prophylaxis - Protonix po QD.  Transfusion / electrolyte support  Aggressive mouth care and skin care as per protocol. 59 year old male, with previously diagnosed acute myeloid leukemia. s/p induction chemotherapy with Daunorubicin/Cytarabine and HIDAC consolidation chemotherapy, now admitted for Haplo-identical stem cell transplant from son. Conditioning regimen of Fludarabine, Cytoxan, and TBI. Other history includes non-ischemic cardiomyopathy (recent EF 25% 9/19/19), COLLEEN, DVT/PE, HTN, Factor V Leiden and amputation of right toe due to osteomyelitis.  Day + 5.  Febrile likely due to haplostorm,  s/p CTX 2/2.  Follow temps if persist can dose 1 mg/kg of solumedrol.  Blood/urine cultures from 10/10 negative.  Continue cefepime/voriconazole for now along with acyclovir prophylaxis.  If continues, can consider adding IV vanco.  Strict I&O, daily weights, prn diuresis- lasix today post CTX.  Renal insufficiency- monitor daily creatinine, normal today.   Start Zarxio on day +5,  MMF and Tacrolimus. Check Tacrolimus levels on Monday and Thursday.   Anxiolytics, antinausea, antidiarrhea medications as needed   Nutritional support.  Hx non-ischemic CM, followed by Dr. Luke Lyn, last Echo 9/19 (EF 25%).  Monitor for fluid overload.  VOD prophylaxis - low dose heparin gtt (dosed at 100 units / kg / day), glutamine supplementation, Actigall BID   GI prophylaxis - Protonix po QD.  Transfusion / electrolyte support  Aggressive mouth care and skin care as per protocol.

## 2019-10-14 NOTE — PROGRESS NOTE ADULT - SUBJECTIVE AND OBJECTIVE BOX
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  Feels fatigued, depressed appetite.      Denies mouth / tongue / throat pain, dyspnea, cough, nausea, vomiting, diarrhea, abdominal pain                                                                                                                                                   O: Vitals:   Vital Signs Last 24 Hrs  T(C): 36.6 (14 Oct 2019 05:19), Max: 38 (13 Oct 2019 17:38)  T(F): 97.8 (14 Oct 2019 05:19), Max: 100.4 (13 Oct 2019 17:38)  HR: 92 (14 Oct 2019 05:19) (81 - 106)  BP: 122/70 (14 Oct 2019 05:19) (103/62 - 122/70)  BP(mean): --  RR: 18 (14 Oct 2019 05:19) (18 - 18)  SpO2: 97% (14 Oct 2019 05:19) (96% - 99%)    Admit weight:   Daily     Daily Weight in k.8 (13 Oct 2019 09:28)    Intake / Output:   10-13 @ 07:01  -  10-14 @ 07:00  --------------------------------------------------------  IN: 8593 mL / OUT: 8325 mL / NET: 268 mL    10-14 @ 07:01  -  10-14 @ 08:57  --------------------------------------------------------  IN: 318.9 mL / OUT: 0 mL / NET: 318.9 mL    PE:   Oropharynx: no ulceration or erythema  Oral Mucositis:       none.  Dry                                             Grade:   CVS: reg S1 S2  Lungs: CTA  Abdomen: soft, + normoactive BS, NT, ND  Extremities: No C/C/E.  Toe amputation  Gastric Mucositis:     none                                             Grade:   Intestinal Mucositis:      none                                        Grade:   Skin: no rash  TLC:  C/D/I  Neuro: Alert and oriented x3  Pain: none     Labs:   CBC Full  -  ( 14 Oct 2019 06:55 )  WBC Count : <0.10 K/uL  Hemoglobin : 7.0 g/dL  Hematocrit : 20.7 %  Platelet Count - Automated : 25 K/uL  Mean Cell Volume : 89.2 fl  Mean Cell Hemoglobin : 30.2 pg  Mean Cell Hemoglobin Concentration : 33.8 gm/dL  Auto Neutrophil # : x  Auto Lymphocyte # : x  Auto Monocyte # : x  Auto Eosinophil # : x  Auto Basophil # : x  Auto Neutrophil % : x  Auto Lymphocyte % : x  Auto Monocyte % : x  Auto Eosinophil % : x  Auto Basophil % : x                          7.0    <0.10 )-----------( 25       ( 14 Oct 2019 06:55 )             20.7     10-14    135  |  100  |  16  ----------------------------<  97  2.9<LL>   |  23  |  1.03    Ca    8.4      14 Oct 2019 06:55  Phos  3.6     10-  Mg     1.6     10-    TPro  5.8<L>  /  Alb  3.1<L>  /  TBili  0.4  /  DBili  0.1  /  AST  9<L>  /  ALT  13  /  AlkPhos  64  10-    PT/INR - ( 14 Oct 2019 06:55 )   PT: 13.6 sec;   INR: 1.19 ratio         PTT - ( 14 Oct 2019 06:55 )  PTT:30.5 sec  LIVER FUNCTIONS - ( 14 Oct 2019 06:55 )  Alb: 3.1 g/dL / Pro: 5.8 g/dL / ALK PHOS: 64 U/L / ALT: 13 U/L / AST: 9 U/L / GGT: x           Lactate Dehydrogenase, Serum: 149 U/L (10-14 @ 06:55)          Karnofsky / Lansky Scale:   GVHD:   Skin:   Liver:   Gut:   Overall Grade:       Cultures:         Radiology:       Meds:   Antimicrobials:   acyclovir   Oral Tab/Cap 400 milliGRAM(s) Oral every 8 hours  cefepime   IVPB 2000 milliGRAM(s) IV Intermittent every 8 hours  clotrimazole Lozenge 1 Lozenge Oral five times a day  voriconazole 200 milliGRAM(s) Oral every 12 hours      Heme / Onc:   heparin  Infusion 339 Unit(s)/Hr IV Continuous <Continuous>      GI:  docusate sodium 100 milliGRAM(s) Oral three times a day  pantoprazole    Tablet 40 milliGRAM(s) Oral before breakfast  senna 2 Tablet(s) Oral at bedtime  sodium bicarbonate Mouth Rinse 10 milliLiter(s) Swish and Spit five times a day  ursodiol Capsule 300 milliGRAM(s) Oral two times a day with meals      Cardiovascular:   enalapril 2.5 milliGRAM(s) Oral daily  furosemide   Injectable 40 milliGRAM(s) IV Push <User Schedule>  metoprolol succinate ER 12.5 milliGRAM(s) Oral two times a day  tamsulosin 0.4 milliGRAM(s) Oral at bedtime      Immunologic:   filgrastim-sndz Injectable 480 MICROGram(s) SubCutaneous daily  immune   globulin 10% (GAMMAGARD) IVPB 30 Gram(s) IV Intermittent <User Schedule>  mycophenolate mofetil 1000 milliGRAM(s) Oral <User Schedule>  tacrolimus 1 milliGRAM(s) Oral two times a day      Other medications:   acetaminophen   Tablet .. 650 milliGRAM(s) Oral every 6 hours  acetaminophen   Tablet .. 650 milliGRAM(s) Oral <User Schedule>  ALPRAZolam 0.5 milliGRAM(s) Oral at bedtime  Biotene Dry Mouth Oral Rinse 5 milliLiter(s) Swish and Spit five times a day  chlorhexidine 4% Liquid 1 Application(s) Topical <User Schedule>  diphenhydrAMINE   Injectable 25 milliGRAM(s) IV Push <User Schedule>  finasteride 5 milliGRAM(s) Oral daily  folic acid 1 milliGRAM(s) Oral daily  lidocaine/prilocaine Cream 1 Application(s) Topical daily  multivitamin 1 Tablet(s) Oral daily  ondansetron Injectable 8 milliGRAM(s) IV Push every 8 hours  sertraline 50 milliGRAM(s) Oral at bedtime  sodium chloride 0.45% 1000 milliLiter(s) IV Continuous <Continuous>  sodium chloride 0.45%. 1000 milliLiter(s) IV Continuous <Continuous>  sodium chloride 0.9%. 1000 milliLiter(s) IV Continuous <Continuous>      PRN:   acetaminophen   Tablet .. 650 milliGRAM(s) Oral every 6 hours PRN  acetaminophen  IVPB .. 1000 milliGRAM(s) IV Intermittent once PRN  diphenhydrAMINE   Injectable 25 milliGRAM(s) IV Push every 4 hours PRN  metoclopramide Injectable 10 milliGRAM(s) IV Push every 6 hours PRN  sodium chloride 0.9% lock flush 10 milliLiter(s) IV Push every 1 hour PRN      A/P:   59 year old male with a history of AML  Pre :  Allogeneic PBSCT day + 5  s/p HPC transplant 10/9 - continue transplant hydration for 24 hours post infusion of cells   Strict I&O, daily weights, prn diuresis   10/10 neutropenic fever, cipro changed to cefepime. Follow up cultures.   Cardiology following. Continue current plan.   UA and BC from 10/10 (-)  Cytoxan 10/12,    1. Infectious Disease:   acyclovir   Oral Tab/Cap 400 milliGRAM(s) Oral every 8 hours  ciprofloxacin     Tablet 500 milliGRAM(s) Oral every 12 hours  clotrimazole Lozenge 1 Lozenge Oral five times a day  voriconazole 200 milliGRAM(s) Oral every 12 hours    2. VOD Prophylaxis: Actigall, Glutamine, Heparin (dosed at 100 units / kg / day)     3. GI Prophylaxis:   pantoprazole    Tablet 40 milliGRAM(s) Oral before breakfast    4. Mouthcare - NS / NaHCO3 rinses, Mycelex, Biotene; Skin care     5. GVHD prophylaxis   Tacro, MMF to start on day + 5   CTX days + 3, +4   TBI day -1     6. Transfuse & replete electrolytes prn   hypomagnesemia-replace mg  hypophosphatemia-replace phos  thrombocytopenia-replace plt    7. IV hydration, daily weights, strict I&O, prn diuresis     8. PO intake as tolerated, nutrition follow up as needed, MVI, folic acid     9. Antiemetics, anti-diarrhea medications:   metoclopramide Injectable 10 milliGRAM(s) IV Push every 6 hours PRN  ondansetron Injectable 8 milliGRAM(s) IV Push every 8 hours  aprepitant 80 milliGRAM(s) Oral every 24 hours    10. OOB as tolerated, physical therapy consult if needed     11. Monitor coags / fibrinogen 2x week, vitamin K as needed     12. Monitor closely for clinical changes, monitor for fevers     13. Emotional support provided, plan of care discussed and questions addressed     14. Patient education done regarding plan of care, restrictions and discharge planning     15. Continue regular social work input           I have written the above note for Dr. Quiñones who performed service with me in the room.   Brandon Hansen  NP-C ([phone])    I have seen and examined patient with NP, I agree with above note as scribed. 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    Denies mouth / tongue / throat pain, dyspnea, cough, nausea, vomiting, diarrhea, abdominal pain                                                                                                                                                   O: Vitals:   Vital Signs Last 24 Hrs  T(C): 36.6 (14 Oct 2019 05:19), Max: 38 (13 Oct 2019 17:38)  T(F): 97.8 (14 Oct 2019 05:19), Max: 100.4 (13 Oct 2019 17:38)  HR: 92 (14 Oct 2019 05:19) (81 - 106)  BP: 122/70 (14 Oct 2019 05:19) (103/62 - 122/70)  BP(mean): --  RR: 18 (14 Oct 2019 05:19) (18 - 18)  SpO2: 97% (14 Oct 2019 05:19) (96% - 99%)    Admit weight: 81.5kg  Daily weight:    80.4kg    Intake / Output:   10-13 @ 07:01  -  10-14 @ 07:00  --------------------------------------------------------  IN: 8593 mL / OUT: 8325 mL / NET: 268 mL    10-14 @ 07:01  -  10-14 @ 08:57  --------------------------------------------------------  IN: 318.9 mL / OUT: 0 mL / NET: 318.9 mL    PE:   Oropharynx: no ulceration or erythema  Oral Mucositis:       none.  Dry                                             Grade:   CVS: reg S1 S2  Lungs: CTA  Abdomen: soft, + normoactive BS, NT, ND  Extremities: No C/C/E.  Toe amputation  Gastric Mucositis:     none                                             Grade:   Intestinal Mucositis:      none                                        Grade:   Skin: no rash  TLC:  C/D/I  Neuro: Alert and oriented x3  Pain: none     Labs:   CBC Full  -  ( 14 Oct 2019 06:55 )  WBC Count : <0.10 K/uL  Hemoglobin : 7.0 g/dL  Hematocrit : 20.7 %  Platelet Count - Automated : 25 K/uL  Mean Cell Volume : 89.2 fl  Mean Cell Hemoglobin : 30.2 pg  Mean Cell Hemoglobin Concentration : 33.8 gm/dL  Auto Neutrophil # : x  Auto Lymphocyte # : x  Auto Monocyte # : x  Auto Eosinophil # : x  Auto Basophil # : x  Auto Neutrophil % : x  Auto Lymphocyte % : x  Auto Monocyte % : x  Auto Eosinophil % : x  Auto Basophil % : x                          7.0    <0.10 )-----------( 25       ( 14 Oct 2019 06:55 )             20.7     10-14    135  |  100  |  16  ----------------------------<  97  2.9<LL>   |  23  |  1.03    Ca    8.4      14 Oct 2019 06:55  Phos  3.6     10-14  Mg     1.6     10-14    TPro  5.8<L>  /  Alb  3.1<L>  /  TBili  0.4  /  DBili  0.1  /  AST  9<L>  /  ALT  13  /  AlkPhos  64  10-14    PT/INR - ( 14 Oct 2019 06:55 )   PT: 13.6 sec;   INR: 1.19 ratio         PTT - ( 14 Oct 2019 06:55 )  PTT:30.5 sec  LIVER FUNCTIONS - ( 14 Oct 2019 06:55 )  Alb: 3.1 g/dL / Pro: 5.8 g/dL / ALK PHOS: 64 U/L / ALT: 13 U/L / AST: 9 U/L / GGT: x           Lactate Dehydrogenase, Serum: 149 U/L (10-14 @ 06:55)    Cultures:   Culture - Urine (10.13.19 @ 04:27)    Specimen Source: .Urine    Culture Results:   No growth    Culture - Blood (10.12.19 @ 22:28)    Specimen Source: .Blood    Culture Results:   No growth to date.    Radiology:   < from: Xray Chest 1 View- PORTABLE-Routine (10.12.19 @ 09:00) >   Clear lungs.    Meds:   Antimicrobials:   acyclovir   Oral Tab/Cap 400 milliGRAM(s) Oral every 8 hours  cefepime   IVPB 2000 milliGRAM(s) IV Intermittent every 8 hours  clotrimazole Lozenge 1 Lozenge Oral five times a day  voriconazole 200 milliGRAM(s) Oral every 12 hours    Heme / Onc:   heparin  Infusion 339 Unit(s)/Hr IV Continuous <Continuous>    GI:  docusate sodium 100 milliGRAM(s) Oral three times a day  pantoprazole    Tablet 40 milliGRAM(s) Oral before breakfast  senna 2 Tablet(s) Oral at bedtime  sodium bicarbonate Mouth Rinse 10 milliLiter(s) Swish and Spit five times a day  ursodiol Capsule 300 milliGRAM(s) Oral two times a day with meals    Cardiovascular:   enalapril 2.5 milliGRAM(s) Oral daily  furosemide   Injectable 40 milliGRAM(s) IV Push <User Schedule>  metoprolol succinate ER 12.5 milliGRAM(s) Oral two times a day  tamsulosin 0.4 milliGRAM(s) Oral at bedtime    Immunologic:   filgrastim-sndz Injectable 480 MICROGram(s) SubCutaneous daily  immune   globulin 10% (GAMMAGARD) IVPB 30 Gram(s) IV Intermittent <User Schedule>  mycophenolate mofetil 1000 milliGRAM(s) Oral <User Schedule>  tacrolimus 1 milliGRAM(s) Oral two times a day      Other medications:   acetaminophen   Tablet .. 650 milliGRAM(s) Oral every 6 hours  acetaminophen   Tablet .. 650 milliGRAM(s) Oral <User Schedule>  ALPRAZolam 0.5 milliGRAM(s) Oral at bedtime  Biotene Dry Mouth Oral Rinse 5 milliLiter(s) Swish and Spit five times a day  chlorhexidine 4% Liquid 1 Application(s) Topical <User Schedule>  diphenhydrAMINE   Injectable 25 milliGRAM(s) IV Push <User Schedule>  finasteride 5 milliGRAM(s) Oral daily  folic acid 1 milliGRAM(s) Oral daily  lidocaine/prilocaine Cream 1 Application(s) Topical daily  multivitamin 1 Tablet(s) Oral daily  ondansetron Injectable 8 milliGRAM(s) IV Push every 8 hours  sertraline 50 milliGRAM(s) Oral at bedtime  sodium chloride 0.45% 1000 milliLiter(s) IV Continuous <Continuous>  sodium chloride 0.45%. 1000 milliLiter(s) IV Continuous <Continuous>  sodium chloride 0.9%. 1000 milliLiter(s) IV Continuous <Continuous>      PRN:   acetaminophen   Tablet .. 650 milliGRAM(s) Oral every 6 hours PRN  acetaminophen  IVPB .. 1000 milliGRAM(s) IV Intermittent once PRN  diphenhydrAMINE   Injectable 25 milliGRAM(s) IV Push every 4 hours PRN  metoclopramide Injectable 10 milliGRAM(s) IV Push every 6 hours PRN  sodium chloride 0.9% lock flush 10 milliLiter(s) IV Push every 1 hour PRN      A/P:   59 year old male with a history of AML  Pre :  Allogeneic PBSCT day + 5  s/p HPC transplant 10/9 - continue transplant hydration for 24 hours post infusion of cells   Strict I&O, daily weights, prn diuresis   10/10 neutropenic fever, cipro changed to cefepime. Follow up cultures.   Cardiology following. Continue current plan.   UA and BC from 10/10 (-)  Cytoxan 10/12,13  10/14- Start Tacro, Cellcept and Zarxio today.    1. Infectious Disease:   acyclovir   Oral Tab/Cap 400 milliGRAM(s) Oral every 8 hours  ciprofloxacin     Tablet 500 milliGRAM(s) Oral every 12 hours  clotrimazole Lozenge 1 Lozenge Oral five times a day  voriconazole 200 milliGRAM(s) Oral every 12 hours    2. VOD Prophylaxis: Actigall, Glutamine, Heparin (dosed at 100 units / kg / day)     3. GI Prophylaxis:   pantoprazole    Tablet 40 milliGRAM(s) Oral before breakfast    4. Mouthcare - NS / NaHCO3 rinses, Mycelex, Biotene; Skin care     5. GVHD prophylaxis   Tacro, MMF to start on day + 5     6. Transfuse & replete electrolytes prn   Hypokalemia- Supplement K+.    7. IV hydration, daily weights, strict I&O, prn diuresis     8. PO intake as tolerated, nutrition follow up as needed, MVI, folic acid     9. Antiemetics, anti-diarrhea medications:   metoclopramide Injectable 10 milliGRAM(s) IV Push every 6 hours PRN  ondansetron Injectable 8 milliGRAM(s) IV Push every 8 hours  aprepitant 80 milliGRAM(s) Oral every 24 hours    10. OOB as tolerated, physical therapy consult if needed     11. Monitor coags / fibrinogen 2x week, vitamin K as needed     12. Monitor closely for clinical changes, monitor for fevers     13. Emotional support provided, plan of care discussed and questions addressed     14. Patient education done regarding plan of care, restrictions and discharge planning     15. Continue regular social work input     I have written the above note for Dr. Gaytan who performed service with me in the room.   Brandon Hansen  NP-C ([phone])    I have seen and examined patient with NP, I agree with above note as scribed.

## 2019-10-15 LAB
ALBUMIN SERPL ELPH-MCNC: 3.3 G/DL — SIGNIFICANT CHANGE UP (ref 3.3–5)
ALP SERPL-CCNC: 61 U/L — SIGNIFICANT CHANGE UP (ref 40–120)
ALT FLD-CCNC: 11 U/L — SIGNIFICANT CHANGE UP (ref 10–45)
ANION GAP SERPL CALC-SCNC: 13 MMOL/L — SIGNIFICANT CHANGE UP (ref 5–17)
AST SERPL-CCNC: 14 U/L — SIGNIFICANT CHANGE UP (ref 10–40)
BILIRUB SERPL-MCNC: 0.6 MG/DL — SIGNIFICANT CHANGE UP (ref 0.2–1.2)
BUN SERPL-MCNC: 20 MG/DL — SIGNIFICANT CHANGE UP (ref 7–23)
CALCIUM SERPL-MCNC: 8.8 MG/DL — SIGNIFICANT CHANGE UP (ref 8.4–10.5)
CHLORIDE SERPL-SCNC: 104 MMOL/L — SIGNIFICANT CHANGE UP (ref 96–108)
CO2 SERPL-SCNC: 22 MMOL/L — SIGNIFICANT CHANGE UP (ref 22–31)
CREAT SERPL-MCNC: 1.02 MG/DL — SIGNIFICANT CHANGE UP (ref 0.5–1.3)
CULTURE RESULTS: SIGNIFICANT CHANGE UP
CULTURE RESULTS: SIGNIFICANT CHANGE UP
GLUCOSE SERPL-MCNC: 92 MG/DL — SIGNIFICANT CHANGE UP (ref 70–99)
HCT VFR BLD CALC: 21.2 % — LOW (ref 39–50)
HGB BLD-MCNC: 7.1 G/DL — LOW (ref 13–17)
LDH SERPL L TO P-CCNC: 144 U/L — SIGNIFICANT CHANGE UP (ref 50–242)
MAGNESIUM SERPL-MCNC: 1.6 MG/DL — SIGNIFICANT CHANGE UP (ref 1.6–2.6)
MCHC RBC-ENTMCNC: 30.5 PG — SIGNIFICANT CHANGE UP (ref 27–34)
MCHC RBC-ENTMCNC: 33.5 GM/DL — SIGNIFICANT CHANGE UP (ref 32–36)
MCV RBC AUTO: 91 FL — SIGNIFICANT CHANGE UP (ref 80–100)
NRBC # BLD: 0 /100 WBCS — SIGNIFICANT CHANGE UP (ref 0–0)
PHOSPHATE SERPL-MCNC: 3.4 MG/DL — SIGNIFICANT CHANGE UP (ref 2.5–4.5)
PLATELET # BLD AUTO: 20 K/UL — CRITICAL LOW (ref 150–400)
POTASSIUM SERPL-MCNC: 3.5 MMOL/L — SIGNIFICANT CHANGE UP (ref 3.5–5.3)
POTASSIUM SERPL-SCNC: 3.5 MMOL/L — SIGNIFICANT CHANGE UP (ref 3.5–5.3)
PROT SERPL-MCNC: 5.8 G/DL — LOW (ref 6–8.3)
RBC # BLD: 2.33 M/UL — LOW (ref 4.2–5.8)
RBC # FLD: 18.2 % — HIGH (ref 10.3–14.5)
SODIUM SERPL-SCNC: 139 MMOL/L — SIGNIFICANT CHANGE UP (ref 135–145)
SPECIMEN SOURCE: SIGNIFICANT CHANGE UP
SPECIMEN SOURCE: SIGNIFICANT CHANGE UP
WBC # BLD: <0.1 K/UL — CRITICAL LOW (ref 3.8–10.5)
WBC # FLD AUTO: <0.1 K/UL — CRITICAL LOW (ref 3.8–10.5)

## 2019-10-15 PROCEDURE — 99232 SBSQ HOSP IP/OBS MODERATE 35: CPT

## 2019-10-15 PROCEDURE — 99233 SBSQ HOSP IP/OBS HIGH 50: CPT

## 2019-10-15 PROCEDURE — 99291 CRITICAL CARE FIRST HOUR: CPT

## 2019-10-15 RX ORDER — MAGNESIUM OXIDE 400 MG ORAL TABLET 241.3 MG
400 TABLET ORAL
Refills: 0 | Status: DISCONTINUED | OUTPATIENT
Start: 2019-10-15 | End: 2019-11-01

## 2019-10-15 RX ADMIN — MYCOPHENOLATE MOFETIL 1000 MILLIGRAM(S): 250 CAPSULE ORAL at 08:11

## 2019-10-15 RX ADMIN — VORICONAZOLE 200 MILLIGRAM(S): 10 INJECTION, POWDER, LYOPHILIZED, FOR SOLUTION INTRAVENOUS at 06:45

## 2019-10-15 RX ADMIN — Medication 5 MILLILITER(S): at 12:36

## 2019-10-15 RX ADMIN — MYCOPHENOLATE MOFETIL 1000 MILLIGRAM(S): 250 CAPSULE ORAL at 14:42

## 2019-10-15 RX ADMIN — Medication 5 MILLILITER(S): at 16:26

## 2019-10-15 RX ADMIN — Medication 5 MILLILITER(S): at 08:07

## 2019-10-15 RX ADMIN — Medication 5 MILLILITER(S): at 00:23

## 2019-10-15 RX ADMIN — Medication 10 MILLILITER(S): at 16:26

## 2019-10-15 RX ADMIN — TACROLIMUS 1 MILLIGRAM(S): 5 CAPSULE ORAL at 17:43

## 2019-10-15 RX ADMIN — Medication 480 MICROGRAM(S): at 17:43

## 2019-10-15 RX ADMIN — Medication 1 LOZENGE: at 12:37

## 2019-10-15 RX ADMIN — Medication 1 LOZENGE: at 16:27

## 2019-10-15 RX ADMIN — Medication 10 MILLILITER(S): at 08:09

## 2019-10-15 RX ADMIN — Medication 10 MILLILITER(S): at 20:08

## 2019-10-15 RX ADMIN — TAMSULOSIN HYDROCHLORIDE 0.4 MILLIGRAM(S): 0.4 CAPSULE ORAL at 21:47

## 2019-10-15 RX ADMIN — URSODIOL 300 MILLIGRAM(S): 250 TABLET, FILM COATED ORAL at 17:44

## 2019-10-15 RX ADMIN — Medication 12.5 MILLIGRAM(S): at 17:46

## 2019-10-15 RX ADMIN — Medication 1 TABLET(S): at 12:37

## 2019-10-15 RX ADMIN — Medication 10 MILLILITER(S): at 23:30

## 2019-10-15 RX ADMIN — Medication 1 MILLIGRAM(S): at 12:37

## 2019-10-15 RX ADMIN — CEFEPIME 100 MILLIGRAM(S): 1 INJECTION, POWDER, FOR SOLUTION INTRAMUSCULAR; INTRAVENOUS at 14:00

## 2019-10-15 RX ADMIN — Medication 400 MILLIGRAM(S): at 21:46

## 2019-10-15 RX ADMIN — Medication 0.5 MILLIGRAM(S): at 21:46

## 2019-10-15 RX ADMIN — CEFEPIME 100 MILLIGRAM(S): 1 INJECTION, POWDER, FOR SOLUTION INTRAMUSCULAR; INTRAVENOUS at 21:47

## 2019-10-15 RX ADMIN — Medication 5 MILLILITER(S): at 23:30

## 2019-10-15 RX ADMIN — Medication 400 MILLIGRAM(S): at 14:38

## 2019-10-15 RX ADMIN — Medication 10 MILLILITER(S): at 12:37

## 2019-10-15 RX ADMIN — Medication 10 MILLILITER(S): at 00:23

## 2019-10-15 RX ADMIN — MYCOPHENOLATE MOFETIL 1000 MILLIGRAM(S): 250 CAPSULE ORAL at 21:47

## 2019-10-15 RX ADMIN — Medication 1 LOZENGE: at 20:08

## 2019-10-15 RX ADMIN — Medication 400 MILLIGRAM(S): at 06:45

## 2019-10-15 RX ADMIN — VORICONAZOLE 200 MILLIGRAM(S): 10 INJECTION, POWDER, LYOPHILIZED, FOR SOLUTION INTRAVENOUS at 17:43

## 2019-10-15 RX ADMIN — SERTRALINE 50 MILLIGRAM(S): 25 TABLET, FILM COATED ORAL at 21:47

## 2019-10-15 RX ADMIN — Medication 1 LOZENGE: at 23:30

## 2019-10-15 RX ADMIN — Medication 1 LOZENGE: at 00:23

## 2019-10-15 RX ADMIN — ONDANSETRON 8 MILLIGRAM(S): 8 TABLET, FILM COATED ORAL at 09:43

## 2019-10-15 RX ADMIN — TACROLIMUS 1 MILLIGRAM(S): 5 CAPSULE ORAL at 06:45

## 2019-10-15 RX ADMIN — URSODIOL 300 MILLIGRAM(S): 250 TABLET, FILM COATED ORAL at 08:11

## 2019-10-15 RX ADMIN — MAGNESIUM OXIDE 400 MG ORAL TABLET 400 MILLIGRAM(S): 241.3 TABLET ORAL at 12:39

## 2019-10-15 RX ADMIN — Medication 1 LOZENGE: at 08:09

## 2019-10-15 RX ADMIN — FINASTERIDE 5 MILLIGRAM(S): 5 TABLET, FILM COATED ORAL at 12:38

## 2019-10-15 RX ADMIN — Medication 12.5 MILLIGRAM(S): at 06:45

## 2019-10-15 RX ADMIN — ONDANSETRON 8 MILLIGRAM(S): 8 TABLET, FILM COATED ORAL at 01:50

## 2019-10-15 RX ADMIN — CEFEPIME 100 MILLIGRAM(S): 1 INJECTION, POWDER, FOR SOLUTION INTRAMUSCULAR; INTRAVENOUS at 06:46

## 2019-10-15 RX ADMIN — Medication 2.5 MILLIGRAM(S): at 06:45

## 2019-10-15 RX ADMIN — MAGNESIUM OXIDE 400 MG ORAL TABLET 400 MILLIGRAM(S): 241.3 TABLET ORAL at 17:43

## 2019-10-15 RX ADMIN — Medication 5 MILLILITER(S): at 20:08

## 2019-10-15 RX ADMIN — PANTOPRAZOLE SODIUM 40 MILLIGRAM(S): 20 TABLET, DELAYED RELEASE ORAL at 06:46

## 2019-10-15 NOTE — PROGRESS NOTE ADULT - SUBJECTIVE AND OBJECTIVE BOX
[facility]    Ede Zamudio, Riki, Edgar, Gem, Jose, Tabitha      [phone]    CHIEF COMPLAINT: Patient is a 59y old  Male who presents with a chief complaint of Allogenic stem cell transplant (15 Oct 2019 07:41)      Follow Up: nicm, bm tx    Interim history: The patient reports no new symptoms.  Denies chest discomfort and shortness of breath.  No abdominal pain.  No new neurologic symptoms.      MEDICATIONS  (STANDING):  acetaminophen   Tablet .. 650 milliGRAM(s) Oral every 6 hours  acetaminophen   Tablet .. 650 milliGRAM(s) Oral <User Schedule>  acyclovir   Oral Tab/Cap 400 milliGRAM(s) Oral every 8 hours  ALPRAZolam 0.5 milliGRAM(s) Oral at bedtime  Biotene Dry Mouth Oral Rinse 5 milliLiter(s) Swish and Spit five times a day  cefepime   IVPB 2000 milliGRAM(s) IV Intermittent every 8 hours  chlorhexidine 4% Liquid 1 Application(s) Topical <User Schedule>  clotrimazole Lozenge 1 Lozenge Oral five times a day  diphenhydrAMINE   Injectable 25 milliGRAM(s) IV Push <User Schedule>  docusate sodium 100 milliGRAM(s) Oral three times a day  enalapril 2.5 milliGRAM(s) Oral daily  filgrastim-sndz Injectable 480 MICROGram(s) SubCutaneous daily  finasteride 5 milliGRAM(s) Oral daily  folic acid 1 milliGRAM(s) Oral daily  heparin  Infusion 339 Unit(s)/Hr (3.39 mL/Hr) IV Continuous <Continuous>  immune   globulin 10% (GAMMAGARD) IVPB 30 Gram(s) IV Intermittent <User Schedule>  lidocaine/prilocaine Cream 1 Application(s) Topical daily  magnesium oxide 400 milliGRAM(s) Oral three times a day with meals  metoprolol succinate ER 12.5 milliGRAM(s) Oral two times a day  multivitamin 1 Tablet(s) Oral daily  mycophenolate mofetil 1000 milliGRAM(s) Oral <User Schedule>  pantoprazole    Tablet 40 milliGRAM(s) Oral before breakfast  senna 2 Tablet(s) Oral at bedtime  sertraline 50 milliGRAM(s) Oral at bedtime  sodium bicarbonate Mouth Rinse 10 milliLiter(s) Swish and Spit five times a day  sodium chloride 0.9%. 1000 milliLiter(s) (20 mL/Hr) IV Continuous <Continuous>  tacrolimus 1 milliGRAM(s) Oral two times a day  tamsulosin 0.4 milliGRAM(s) Oral at bedtime  ursodiol Capsule 300 milliGRAM(s) Oral two times a day with meals  voriconazole 200 milliGRAM(s) Oral every 12 hours    MEDICATIONS  (PRN):  acetaminophen   Tablet .. 650 milliGRAM(s) Oral every 6 hours PRN Temp greater or equal to 38C (100.4F), Mild Pain (1 - 3)  acetaminophen  IVPB .. 1000 milliGRAM(s) IV Intermittent once PRN Temp greater or equal to 38.5C (101.3F)  diphenhydrAMINE   Injectable 25 milliGRAM(s) IV Push every 4 hours PRN Allergy symptoms  metoclopramide Injectable 10 milliGRAM(s) IV Push every 6 hours PRN Nausea and/or Vomiting  sodium chloride 0.9% lock flush 10 milliLiter(s) IV Push every 1 hour PRN Pre/post blood products, medications, blood draw, and to maintain line patency      REVIEW OF SYSTEMS:  eye, ent, GI, , allergic, dermatologic, musculoskeletal and neurologic are negative except as described above    Vital Signs Last 24 Hrs  T(C): 36.1 (15 Oct 2019 10:47), Max: 36.6 (14 Oct 2019 21:30)  T(F): 97 (15 Oct 2019 10:47), Max: 97.9 (14 Oct 2019 21:30)  HR: 93 (15 Oct 2019 10:47) (84 - 97)  BP: 121/75 (15 Oct 2019 10:47) (106/64 - 154/89)  BP(mean): --  RR: 18 (15 Oct 2019 10:47) (18 - 18)  SpO2: 99% (15 Oct 2019 10:47) (97% - 99%)    I&O's Summary    14 Oct 2019 07:01  -  15 Oct 2019 07:00  --------------------------------------------------------  IN: 4479.7 mL / OUT: 5500 mL / NET: -1020.3 mL        Telemetry past 24h:    PHYSICAL EXAM:    Constitutional: well-nourished, well-developed, NAD   HEENT:  MMM, sclerae anicteric, conjunctivae clear, no oral cyanosis.  Pulmonary: Non-labored, breath sounds are clear bilaterally, No wheezing, rales or rhonchi  Cardiovascular: Regular, S1 and S2.  No murmur.  No rubs, gallops or clicks  Gastrointestinal: Bowel Sounds present, soft, nontender.   Lymph: No peripheral edema.   Neurological: Alert, no focal deficits  Skin: No rashes.  Psych:  Mood & affect appropriate    LABS: All Labs Reviewed:                        7.1    <0.10 )-----------( 20       ( 15 Oct 2019 06:53 )             21.2                         7.0    <0.10 )-----------( 25       ( 14 Oct 2019 06:55 )             20.7                         8.0    <0.10 )-----------( 14       ( 13 Oct 2019 07:02 )             23.4     15 Oct 2019 06:53    139    |  104    |  20     ----------------------------<  92     3.5     |  22     |  1.02   14 Oct 2019 17:31    x      |  x      |  x      ----------------------------<  x      3.5     |  x      |  x      14 Oct 2019 06:55    135    |  100    |  16     ----------------------------<  97     2.9     |  23     |  1.03     Ca    8.8        15 Oct 2019 06:53  Ca    8.4        14 Oct 2019 06:55  Ca    8.3        13 Oct 2019 07:00  Phos  3.4       15 Oct 2019 06:53  Phos  3.6       14 Oct 2019 06:55  Phos  2.4       13 Oct 2019 07:00  Mg     1.6       15 Oct 2019 06:53  Mg     1.6       14 Oct 2019 06:55  Mg     1.2       13 Oct 2019 07:00    TPro  5.8    /  Alb  3.3    /  TBili  0.6    /  DBili  x      /  AST  14     /  ALT  11     /  AlkPhos  61     15 Oct 2019 06:53  TPro  5.8    /  Alb  3.1    /  TBili  0.4    /  DBili  0.1    /  AST  9      /  ALT  13     /  AlkPhos  64     14 Oct 2019 06:55  TPro  6.0    /  Alb  3.3    /  TBili  0.5    /  DBili  x      /  AST  18     /  ALT  17     /  AlkPhos  68     13 Oct 2019 07:00    PT/INR - ( 14 Oct 2019 06:55 )   PT: 13.6 sec;   INR: 1.19 ratio         PTT - ( 14 Oct 2019 06:55 )  PTT:30.5 sec      Blood Culture: Organism --  Gram Stain Blood -- Gram Stain --  Specimen Source .Urine  Culture-Blood --    Organism --  Gram Stain Blood -- Gram Stain --  Specimen Source .Blood  Culture-Blood --    Organism --  Gram Stain Blood -- Gram Stain --  Specimen Source .Urine  Culture-Blood --    Organism --  Gram Stain Blood -- Gram Stain --  Specimen Source .Blood  Culture-Blood --            RADIOLOGY:    EKG:    Echo:

## 2019-10-15 NOTE — CHART NOTE - NSCHARTNOTEFT_GEN_A_CORE
Patient seen for nutrition follow up. Pt with AML s/p haploidentical PBSCT day+6.    Source: Information obtained from pt, RN, comprehensive chart review.    Diet : Regular + Glutasolve 1 packet daily    Patient reports:    PO intake : Nursing flowsheets document ~25-    Source for PO intake: Pt, nursing flowsheets.    Weights:   % Weight Change    Pertinent Medications: Colace, senna, reglan, protonix, magnesium oxide, folic acid, MVI, lasix, biotene mouth rinse, sodium bicarbonate mouth rinse, IV NaCl 0.9% @ 20 mL/hr  Pertinent Labs: Reviewed    Skin per nursing documentation:   Edema:    Estimated Needs:   [ ] no change since previous assessment  [ ] recalculated:     Previous Nutrition Diagnosis:   Nutrition Diagnosis is:    New Nutrition Diagnosis:  Related to:    As evidenced by:      Interventions:     Recommend  1)    Monitoring and Evaluation:     Continue to monitor Nutritional intake, Tolerance to diet prescription, weights, labs, skin integrity    RD remains available upon request and will follow up per protocol Patient seen for nutrition follow up. Pt with AML s/p haploidentical PBSCT day+6.    Source: Information obtained from pt & comprehensive chart review completed.    Diet : Regular + Glutasolve 1 packet daily    Patient reports: Decreased appetite past 2 days. Denies N+V, no current GI distress. Nursing flowsheets document loose stool; 3 BMs 10/14. Pt reports one BM so far today, which was more solid. Pt reports discontinued use of dentures as gums are swollen.     PO intake: Nursing flowsheets document ~25-50% past 3 days. Pt reports ~50% of meals past 3 days & intake of softer foods (pudding, ice cream, yogurt) as he is not wearing dentures at this time. Pt reports consumption of 3 milkshakes yesterday with the intention that he would meet his caloric needs through the dense shakes. However, pt experienced multiple loose BMs following this & communicates understanding of consuming more solid foods to prevent this occurrence.     Source for PO intake: Pt, nursing flowsheets.    Weights, standing: (10/15/19) 173.7 lbs, (10/14/19) 177.2 lbs, (10/12/19) 181.6 lbs, (10/9/19) 173.7 lbs, Noted admission weight (10/3/19) 179 lbs.   % Weight Change: Pt with varying weights during length of stay; likely related to fluid shifts, pt receiving Lasix.     Pertinent Medications: Colace, senna, reglan, protonix, magnesium oxide, folic acid, MVI, lasix, biotene mouth rinse, sodium bicarbonate mouth rinse, IV NaCl 0.9% @ 20 mL/hr  Pertinent Labs: Reviewed    Skin per nursing documentation: Intact  Edema: None noted at this time as per nursing flowsheets.    Estimated Needs:   [X] no change since previous assessment    Previous Nutrition Diagnosis: [X] Predicted suboptimal energy intake  Nutrition Diagnosis is: [X] Ongoing    Interventions:   1) Recommend po intake of binding foods to assist with loose stools.  2) Recommended softer, protein rich menu items to assist in po intake of adequate needs.  3) Reinforced importance of protein rich foods at each meal time.  4) Encouraged consumption of small, calorically dense meals/snacks to assist pt in meeting estimated needs.  5) Obtained & will honor preferences as able.    Monitoring and Evaluation:   [X] PO intake [X] Weights [X] Tolerance to diet [X] Nutrition related labs [X] BMs/GI distress    RD remains available upon request and will follow up per protocol.    Letty Kramer, Dietetic Intern Pager #[phone] Patient seen for nutrition follow up. Pt with AML s/p haploidentical PBSCT day+6.    Source: Information obtained from pt & comprehensive chart review completed.    Diet : Regular + Glutasolve 1 packet daily    Patient reports: Decreased appetite past 2 days. Denies N+V, no current GI distress. Nursing flowsheets document loose stool; 3 BMs 10/14. Pt reports one BM so far today, which was more solid. Pt reports discontinued use of dentures as gums are swollen.     PO intake: Nursing flowsheets document ~25-50% past 3 days. Pt reports ~50% of meals past 3 days & intake of softer foods (pudding, ice cream, yogurt) as he is not wearing dentures at this time. Pt reports consumption of 3 milkshakes yesterday with the intention that he would meet his caloric needs through the dense shakes. However, pt experienced multiple loose BMs following this & communicates understanding of consuming more solid foods to prevent this occurrence. Pt uninterested in nutritional supplement shake alternatives as he does not like the taste.    Source for PO intake: Pt, nursing flowsheets.    Weights, standing: (10/15/19) 173.7 lbs, (10/14/19) 177.2 lbs, (10/12/19) 181.6 lbs, (10/9/19) 173.7 lbs, Noted admission weight (10/3/19) 179 lbs.   % Weight Change: Pt with varying weights during length of stay; likely related to fluid shifts, pt receiving Lasix.     Pertinent Medications: Colace, senna, reglan, protonix, magnesium oxide, folic acid, MVI, lasix, biotene mouth rinse, sodium bicarbonate mouth rinse, IV NaCl 0.9% @ 20 mL/hr  Pertinent Labs: Reviewed    Skin per nursing documentation: Intact  Edema: None noted at this time as per nursing flowsheets.    Estimated Needs:   [X] no change since previous assessment    Previous Nutrition Diagnosis: [X] Predicted suboptimal energy intake  Nutrition Diagnosis is: [X] Ongoing    Interventions:   1) Recommend po intake of binding foods to assist with loose stools.  2) Recommended softer, protein rich menu items to assist in po intake of adequate needs.  3) Provided mechanical soft menu to provide patient with alternative options.  4) Will provide Magic Cup ice cream (provides 290 gerald, 9gm protein per 4 oz).   5) Encouraged consumption of small, calorically dense meals/snacks to assist pt in meeting estimated needs.      Monitoring and Evaluation:   [X] PO intake [X] Weights [X] Tolerance to diet [X] Nutrition related labs [X] BMs/GI distress    RD remains available upon request and will follow up per protocol.    Letty Kramer, Dietetic Intern Pager #[phone]

## 2019-10-15 NOTE — PROGRESS NOTE ADULT - ATTENDING COMMENTS
59 year old male, with previously diagnosed acute myeloid leukemia. s/p induction chemotherapy with Daunorubicin/Cytarabine and HIDAC consolidation chemotherapy, now admitted for Haplo-identical stem cell transplant from son. Conditioning regimen of Fludarabine, Cytoxan, and TBI. Other history includes non-ischemic cardiomyopathy (recent EF 25% 9/19/19), COLLEEN, DVT/PE, HTN, Factor V Leiden and amputation of right toe due to osteomyelitis.  Day + 5.  Febrile likely due to haplostorm,  s/p CTX 2/2.  Follow temps if persist can dose 1 mg/kg of solumedrol.  Blood/urine cultures from 10/10 negative.  Continue cefepime/voriconazole for now along with acyclovir prophylaxis.  If continues, can consider adding IV vanco.  Strict I&O, daily weights, prn diuresis- lasix today post CTX.  Renal insufficiency- monitor daily creatinine, normal today.   Start Zarxio on day +5,  MMF and Tacrolimus. Check Tacrolimus levels on Monday and Thursday.   Anxiolytics, antinausea, antidiarrhea medications as needed   Nutritional support.  Hx non-ischemic CM, followed by Dr. Luke Lyn, last Echo 9/19 (EF 25%).  Monitor for fluid overload.  VOD prophylaxis - low dose heparin gtt (dosed at 100 units / kg / day), glutamine supplementation, Actigall BID   GI prophylaxis - Protonix po QD.  Transfusion / electrolyte support  Aggressive mouth care and skin care as per protocol. 59 year old male, with previously diagnosed acute myeloid leukemia. s/p induction chemotherapy with Daunorubicin/Cytarabine and HIDAC consolidation chemotherapy, now admitted for Haplo-identical stem cell transplant from son. Conditioning regimen of Fludarabine, Cytoxan, and TBI. Other history includes non-ischemic cardiomyopathy (recent EF 25% 9/19/19), COLLEEN, DVT/PE, HTN, Factor V Leiden and amputation of right toe due to osteomyelitis.  Day + 6.  Febrile likely due to haplostorm,  s/p CTX 2/2.  Follow temps if persist can dose 1 mg/kg of solumedrol.  Blood/urine cultures from 10/10 negative.  Continue cefepime/voriconazole for now along with acyclovir prophylaxis.  If continues, can consider adding IV vanco.  Strict I&O, daily weights, prn diuresis- lasix today post CTX.  Renal insufficiency- monitor daily creatinine, normal today.   Start Zarxio on day +5,  MMF and Tacrolimus. Check Tacrolimus levels on Monday and Thursday.   Anxiolytics, antinausea, antidiarrhea medications as needed   Nutritional support.  Hx non-ischemic CM, followed by Dr. Luke Lyn, last Echo 9/19 (EF 25%).  Monitor for fluid overload.  VOD prophylaxis - low dose heparin gtt (dosed at 100 units / kg / day), glutamine supplementation, Actigall BID   GI prophylaxis - Protonix po QD.  Transfusion / electrolyte support  Aggressive mouth care and skin care as per protocol. 59 year old male, with previously diagnosed acute myeloid leukemia. s/p induction chemotherapy with Daunorubicin/Cytarabine and HIDAC consolidation chemotherapy, now admitted for Haplo-identical stem cell transplant from son. Conditioning regimen of Fludarabine, Cytoxan, and TBI. Other history includes non-ischemic cardiomyopathy (recent EF 25% 9/19/19), COLLEEN, DVT/PE, HTN, Factor V Leiden and amputation of right toe due to osteomyelitis.  Day + 6.  Febrile likely due to haplostorm,  s/p CTX 2/2.  Follow temps if persist can dose 1 mg/kg of solumedrol.  Blood/urine cultures from 10/10 negative.  Continue cefepime/voriconazole for now along with acyclovir prophylaxis.  If continues to spike, can consider adding IV vanco.  Strict I&O, daily weights, prn diuresis-   Renal insufficiency- monitor daily creatinine, normal today.   Started  Zarxio on day +5,  MMF and Tacrolimus. Check Tacrolimus levels on Monday and Thursday.   Anxiolytics, antinausea, antidiarrhea medications as needed   Nutritional support.  Hx non-ischemic CM, followed by Dr. Luke Lyn, last Echo 9/19 (EF 25%).  Monitor for fluid overload.  VOD prophylaxis - low dose heparin gtt (dosed at 100 units / kg / day), glutamine supplementation, Actigall BID   GI prophylaxis - Protonix po QD.  Transfusion / electrolyte support  Aggressive mouth care and skin care as per protocol.

## 2019-10-15 NOTE — PROGRESS NOTE ADULT - SUBJECTIVE AND OBJECTIVE BOX
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 Palliative called to follow for symptoms post-transplant.    INTERVAL EVENTS:  10/11: d#2 post transplant, states feeling well overall, starting to have diminished appetite  10/15: d#6 post transplant, states having a tough weekend with fever and diarrhea, but feeling better today aside from diminished appetite    ADVANCE DIRECTIVES:    DNR [ ]  MOLST  [ ]    Living Will  [ ]   DECISION MAKER(s):  [X ] Health Care Proxy(s)  [ ] Surrogate(s)  [ ] Guardian           Name(s) and Contact(s): Ashlyn (significant other)    BASELINE (I)ADL(s) (prior to admission):  [place]: [X ]Total  [ ] Moderate [ ]Dependent    Allergies    No Known Allergies    Intolerances    MEDICATIONS  (STANDING):  acetaminophen   Tablet .. 650 milliGRAM(s) Oral every 6 hours  acetaminophen   Tablet .. 650 milliGRAM(s) Oral <User Schedule>  acyclovir   Oral Tab/Cap 400 milliGRAM(s) Oral every 8 hours  ALPRAZolam 0.5 milliGRAM(s) Oral at bedtime  Biotene Dry Mouth Oral Rinse 5 milliLiter(s) Swish and Spit five times a day  cefepime   IVPB 2000 milliGRAM(s) IV Intermittent every 8 hours  chlorhexidine 4% Liquid 1 Application(s) Topical <User Schedule>  clotrimazole Lozenge 1 Lozenge Oral five times a day  diphenhydrAMINE   Injectable 25 milliGRAM(s) IV Push <User Schedule>  docusate sodium 100 milliGRAM(s) Oral three times a day  enalapril 2.5 milliGRAM(s) Oral daily  filgrastim-sndz Injectable 480 MICROGram(s) SubCutaneous daily  finasteride 5 milliGRAM(s) Oral daily  folic acid 1 milliGRAM(s) Oral daily  heparin  Infusion 339 Unit(s)/Hr (3.39 mL/Hr) IV Continuous <Continuous>  immune   globulin 10% (GAMMAGARD) IVPB 30 Gram(s) IV Intermittent <User Schedule>  lidocaine/prilocaine Cream 1 Application(s) Topical daily  magnesium oxide 400 milliGRAM(s) Oral three times a day with meals  metoprolol succinate ER 12.5 milliGRAM(s) Oral two times a day  multivitamin 1 Tablet(s) Oral daily  mycophenolate mofetil 1000 milliGRAM(s) Oral <User Schedule>  pantoprazole    Tablet 40 milliGRAM(s) Oral before breakfast  senna 2 Tablet(s) Oral at bedtime  sertraline 50 milliGRAM(s) Oral at bedtime  sodium bicarbonate Mouth Rinse 10 milliLiter(s) Swish and Spit five times a day  sodium chloride 0.9%. 1000 milliLiter(s) (20 mL/Hr) IV Continuous <Continuous>  tacrolimus 1 milliGRAM(s) Oral two times a day  tamsulosin 0.4 milliGRAM(s) Oral at bedtime  ursodiol Capsule 300 milliGRAM(s) Oral two times a day with meals  voriconazole 200 milliGRAM(s) Oral every 12 hours    MEDICATIONS  (PRN):  acetaminophen   Tablet .. 650 milliGRAM(s) Oral every 6 hours PRN Temp greater or equal to 38C (100.4F), Mild Pain (1 - 3)  acetaminophen  IVPB .. 1000 milliGRAM(s) IV Intermittent once PRN Temp greater or equal to 38.5C (101.3F)  diphenhydrAMINE   Injectable 25 milliGRAM(s) IV Push every 4 hours PRN Allergy symptoms  metoclopramide Injectable 10 milliGRAM(s) IV Push every 6 hours PRN Nausea and/or Vomiting  sodium chloride 0.9% lock flush 10 milliLiter(s) IV Push every 1 hour PRN Pre/post blood products, medications, blood draw, and to maintain line patency        PRESENT SYMPTOMS:   Source if other than patient:  [ ]Family   [ ]Team     Pain (Impact on QOL):    Location -         Minimal acceptable level (0-10 scale):                    Aggravating factors -  Quality -  Radiation -  Severity (0-10 scale) -    Timing -    PAIN AD Score:     [email or web address] (press ctrl +  left click to view)    Dyspnea:                           [ ]Mild [ ]Moderate [ ]Severe  Anxiety:                             [ ]Mild [ ]Moderate [ ]Severe  Fatigue:                             [ ]Mild [ ]Moderate [ ]Severe  Nausea:                             [ ]Mild [ ]Moderate [ ]Severe  Loss of appetite:              [ ]Mild [ ]Moderate [ ]Severe  Constipation:                    [ ]Mild [ ]Moderate [ ]Severe    Other Symptoms: diminished appetite  [X ]All other review of systems negative   [ ]Unable to obtain due to poor mentation     Karnofsky Performance Score/Palliative Performance Status Version 2:  70+%    PHYSICAL EXAM:  Vital Signs Last 24 Hrs  T(C): 36.2 (15 Oct 2019 14:11), Max: 36.6 (14 Oct 2019 21:30)  T(F): 97.2 (15 Oct 2019 14:11), Max: 97.9 (14 Oct 2019 21:30)  HR: 99 (15 Oct 2019 14:11) (84 - 99)  BP: 147/85 (15 Oct 2019 14:11) (106/64 - 147/85)  BP(mean): --  RR: 18 (15 Oct 2019 14:11) (18 - 18)  SpO2: 99% (15 Oct 2019 14:11) (98% - 99%)        GENERAL:  [X ]Alert  [ ]Oriented x   [ ]Lethargic  [ ]Cachexia  [ ]Unarousable  [X ]Verbal  [ ]Non-Verbal    Behavioral:   [ ] Anxiety  [ ] Delirium [ ] Agitation [ ] Other    HEENT:  [X ]Normal   [ ]Dry mouth   [ ]ET Tube/Trach  [ ]Oral lesions    PULMONARY:   [X ]Clear [ ]Tachypnea  [ ]Audible excessive secretions   [ ]Rhonchi        [ ]Right [ ]Left [ ]Bilateral  [ ]Crackles        [ ]Right [ ]Left [ ]Bilateral  [ ]Wheezing     [ ]Right [ ]Left [ ]Bilateral    CARDIOVASCULAR:    [ X]Regular [ ]Irregular [ ]Tachy  [ ]Maximino [ ]Murmur [ ]Other    GASTROINTESTINAL:  [ X]Soft  [ ]Distended   [X ]+BS  [ ]Non tender [ ]Tender  [ ]PEG [ ]OGT/ NGT  Last BM: 10/13/19    GENITOURINARY:  [ ]Normal [ ] Incontinent   [ ]Oliguria/Anuria   [ ]Padilla    MUSCULOSKELETAL:   [ ]Normal   [ ]Weakness  [ ]Bed/Wheelchair bound [ ]Edema    NEUROLOGIC:   [ X]No focal deficits  [ ] Cognitive impairment  [ ] Dysphagia [ ]Dysarthria [ ] Paresis [ ]Other     SKIN:   [X ]Normal   [ ]Pressure ulcer(s)  [ ]Rash    CRITICAL CARE:  [ ] Shock Present  [ ]Septic [ ]Cardiogenic [ ]Neurologic [ ]Hypovolemic  [ ]  Vasopressors [ ]  Inotropes   [ ] Respiratory failure present  [ ] Acute  [ ] Chronic [ ] Hypoxic  [ ] Hypercarbic [ ] Other  [ ] Other organ failure     LABS: reviewed                                   7.1    <0.10 )-----------( 20       ( 15 Oct 2019 06:53 )             21.2     10-15    139  |  104  |  20  ----------------------------<  92  3.5   |  22  |  1.02    Ca    8.8      15 Oct 2019 06:53  Phos  3.4     10-15  Mg     1.6     10-15          RADIOLOGY & ADDITIONAL STUDIES: no imaging for review    PROTEIN CALORIE MALNUTRITION:   [ ] PPSV2 < or = to 30% [ ] significant weight loss  [ ] poor nutritional intake [ ] catabolic state [ ] anasarca     Albumin, Serum: 3.4 g/dL (10-04-19 @ 07:34)  Artificial Nutrition [ ]     REFERRALS:   [ ]Chaplaincy  [ ] Hospice  [ ]Child Life  [ ]Social Work  [ ]Case management [ ]Holistic Therapy     Goals of Care Discussion Document:     ........ ....... ...... ..... .... ... .. .  Jairo Benjamin MD  Palliative Medicine  office: [phone] | [phone]  pager: [phone]

## 2019-10-15 NOTE — PROGRESS NOTE ADULT - SUBJECTIVE AND OBJECTIVE BOX
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    Denies mouth / tongue / throat pain, dyspnea, cough, nausea, vomiting, diarrhea, abdominal pain                                                                                                                                                                                                                                                                                                    O: Vitals:   Vital Signs Last 24 Hrs  T(C): 36.3 (15 Oct 2019 06:00), Max: 36.6 (14 Oct 2019 09:56)  T(F): 97.3 (15 Oct 2019 06:00), Max: 97.9 (14 Oct 2019 09:56)  HR: 84 (15 Oct 2019 06:00) (83 - 97)  BP: 118/68 (15 Oct 2019 06:00) (106/64 - 154/89)  BP(mean): --  RR: 18 (15 Oct 2019 06:00) (18 - 18)  SpO2: 98% (14 Oct 2019 21:30) (97% - 98%)    Admit weight:   Daily     Daily Weight in k.4 (14 Oct 2019 09:56)    Intake / Output:   10-14 @ 07:01  -  10-15 @ 07:00  --------------------------------------------------------  IN: 4479.7 mL / OUT: 5500 mL / NET: -1020.3 mL          PE:   Oropharynx: no ulceration or erythema  Oral Mucositis:       none.  Dry                                             Grade:   CVS: reg S1 S2  Lungs: CTA  Abdomen: soft, + normoactive BS, NT, ND  Extremities: No C/C/E.  Toe amputation  Gastric Mucositis:     none                                             Grade:   Intestinal Mucositis:      none                                        Grade:   Skin: no rash  TLC:  C/D/I  Neuro: Alert and oriented x3  Pain: none     Labs:   CBC Full  -  ( 15 Oct 2019 06:53 )  WBC Count : <0.10 K/uL  Hemoglobin : 7.1 g/dL  Hematocrit : 21.2 %  Platelet Count - Automated : 20 K/uL  Mean Cell Volume : 91.0 fl  Mean Cell Hemoglobin : 30.5 pg  Mean Cell Hemoglobin Concentration : 33.5 gm/dL  Auto Neutrophil # : x  Auto Lymphocyte # : x  Auto Monocyte # : x  Auto Eosinophil # : x  Auto Basophil # : x  Auto Neutrophil % : x  Auto Lymphocyte % : x  Auto Monocyte % : x  Auto Eosinophil % : x  Auto Basophil % : x                          7.1    <0.10 )-----------( 20       ( 15 Oct 2019 06:53 )             21.2     10-15    139  |  104  |  20  ----------------------------<  92  3.5   |  22  |  1.02    Ca    8.8      15 Oct 2019 06:53  Phos  3.4     10-15  Mg     1.6     10-15    TPro  5.8<L>  /  Alb  3.3  /  TBili  0.6  /  DBili  x   /  AST  14  /  ALT  11  /  AlkPhos  61  10-15    PT/INR - ( 14 Oct 2019 06:55 )   PT: 13.6 sec;   INR: 1.19 ratio         PTT - ( 14 Oct 2019 06:55 )  PTT:30.5 sec  LIVER FUNCTIONS - ( 15 Oct 2019 06:53 )  Alb: 3.3 g/dL / Pro: 5.8 g/dL / ALK PHOS: 61 U/L / ALT: 11 U/L / AST: 14 U/L / GGT: x           Lactate Dehydrogenase, Serum: 144 U/L (10-15 @ 06:53)        Cultures:         Radiology:       Meds:   Antimicrobials:   acyclovir   Oral Tab/Cap 400 milliGRAM(s) Oral every 8 hours  cefepime   IVPB 2000 milliGRAM(s) IV Intermittent every 8 hours  clotrimazole Lozenge 1 Lozenge Oral five times a day  voriconazole 200 milliGRAM(s) Oral every 12 hours      Heme / Onc:   heparin  Infusion 339 Unit(s)/Hr IV Continuous <Continuous>      GI:  docusate sodium 100 milliGRAM(s) Oral three times a day  pantoprazole    Tablet 40 milliGRAM(s) Oral before breakfast  senna 2 Tablet(s) Oral at bedtime  sodium bicarbonate Mouth Rinse 10 milliLiter(s) Swish and Spit five times a day  ursodiol Capsule 300 milliGRAM(s) Oral two times a day with meals      Cardiovascular:   enalapril 2.5 milliGRAM(s) Oral daily  metoprolol succinate ER 12.5 milliGRAM(s) Oral two times a day  tamsulosin 0.4 milliGRAM(s) Oral at bedtime      Immunologic:   filgrastim-sndz Injectable 480 MICROGram(s) SubCutaneous daily  immune   globulin 10% (GAMMAGARD) IVPB 30 Gram(s) IV Intermittent <User Schedule>  mycophenolate mofetil 1000 milliGRAM(s) Oral <User Schedule>  tacrolimus 1 milliGRAM(s) Oral two times a day      Other medications:   acetaminophen   Tablet .. 650 milliGRAM(s) Oral every 6 hours  acetaminophen   Tablet .. 650 milliGRAM(s) Oral <User Schedule>  ALPRAZolam 0.5 milliGRAM(s) Oral at bedtime  Biotene Dry Mouth Oral Rinse 5 milliLiter(s) Swish and Spit five times a day  chlorhexidine 4% Liquid 1 Application(s) Topical <User Schedule>  diphenhydrAMINE   Injectable 25 milliGRAM(s) IV Push <User Schedule>  finasteride 5 milliGRAM(s) Oral daily  folic acid 1 milliGRAM(s) Oral daily  lidocaine/prilocaine Cream 1 Application(s) Topical daily  multivitamin 1 Tablet(s) Oral daily  ondansetron Injectable 8 milliGRAM(s) IV Push every 8 hours  sertraline 50 milliGRAM(s) Oral at bedtime  sodium chloride 0.45% 1000 milliLiter(s) IV Continuous <Continuous>  sodium chloride 0.45%. 1000 milliLiter(s) IV Continuous <Continuous>  sodium chloride 0.9%. 1000 milliLiter(s) IV Continuous <Continuous>      PRN:   acetaminophen   Tablet .. 650 milliGRAM(s) Oral every 6 hours PRN  acetaminophen  IVPB .. 1000 milliGRAM(s) IV Intermittent once PRN  diphenhydrAMINE   Injectable 25 milliGRAM(s) IV Push every 4 hours PRN  metoclopramide Injectable 10 milliGRAM(s) IV Push every 6 hours PRN  sodium chloride 0.9% lock flush 10 milliLiter(s) IV Push every 1 hour PRN      A/P:   59 year old male with a history of AML  Pre :  Allogeneic PBSCT day + 6  s/p HPC transplant 10/9 - continue transplant hydration for 24 hours post infusion of cells   Strict I&O, daily weights, prn diuresis   10/10 neutropenic fever, cipro changed to cefepime. Follow up cultures.   Cardiology following. Continue current plan.   UA and BC from 10/10 (-)  Cytoxan 10/12,13  10/14- Start Tacro, Cellcept and Zarxio today.    1. Infectious Disease:   acyclovir   Oral Tab/Cap 400 milliGRAM(s) Oral every 8 hours  ciprofloxacin     Tablet 500 milliGRAM(s) Oral every 12 hours  clotrimazole Lozenge 1 Lozenge Oral five times a day  voriconazole 200 milliGRAM(s) Oral every 12 hours    2. VOD Prophylaxis: Actigall, Glutamine, Heparin (dosed at 100 units / kg / day)     3. GI Prophylaxis:   pantoprazole    Tablet 40 milliGRAM(s) Oral before breakfast    4. Mouthcare - NS / NaHCO3 rinses, Mycelex, Biotene; Skin care     5. GVHD prophylaxis   Tacro, MMF to start on day + 5     6. Transfuse & replete electrolytes prn   Hypokalemia- Supplement K+.    7. IV hydration, daily weights, strict I&O, prn diuresis     8. PO intake as tolerated, nutrition follow up as needed, MVI, folic acid     9. Antiemetics, anti-diarrhea medications:   metoclopramide Injectable 10 milliGRAM(s) IV Push every 6 hours PRN  ondansetron Injectable 8 milliGRAM(s) IV Push every 8 hours  aprepitant 80 milliGRAM(s) Oral every 24 hours    10. OOB as tolerated, physical therapy consult if needed     11. Monitor coags / fibrinogen 2x week, vitamin K as needed     12. Monitor closely for clinical changes, monitor for fevers     13. Emotional support provided, plan of care discussed and questions addressed     14. Patient education done regarding plan of care, restrictions and discharge planning     15. Continue regular social work input       I have written the above note for Dr. Gaytan who performed service with me in the room.   Brandon Hansen  NP-C ([phone])    I have seen and examined patient with NP, I agree with above note as scribed. 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    Denies mouth / tongue / throat pain, dyspnea, cough, nausea, vomiting, diarrhea, abdominal pain                                                                                                                                                                                                                                                                                                    O: Vitals:   Vital Signs Last 24 Hrs  T(C): 36.3 (15 Oct 2019 06:00), Max: 36.6 (14 Oct 2019 09:56)  T(F): 97.3 (15 Oct 2019 06:00), Max: 97.9 (14 Oct 2019 09:56)  HR: 84 (15 Oct 2019 06:00) (83 - 97)  BP: 118/68 (15 Oct 2019 06:00) (106/64 - 154/89)  BP(mean): --  RR: 18 (15 Oct 2019 06:00) (18 - 18)  SpO2: 98% (14 Oct 2019 21:30) (97% - 98%)    Admit weight:  81.5kg  Daily Weight in k.4 (14 Oct 2019 09:56)    Intake / Output:   10-14 @ 07:01  -  10-15 @ 07:00  --------------------------------------------------------  IN: 4479.7 mL / OUT: 5500 mL / NET: -1020.3 mL          PE:   Oropharynx: no ulceration or erythema  Oral Mucositis:       none.  Dry                                             Grade:   CVS: reg S1 S2  Lungs: CTA  Abdomen: soft, + normoactive BS, NT, ND  Extremities: No C/C/E.  Toe amputation  Gastric Mucositis:     none                                             Grade:   Intestinal Mucositis:      none                                        Grade:   Skin: no rash  TLC:  C/D/I  Neuro: Alert and oriented x3  Pain: none     Labs:   CBC Full  -  ( 15 Oct 2019 06:53 )  WBC Count : <0.10 K/uL  Hemoglobin : 7.1 g/dL  Hematocrit : 21.2 %  Platelet Count - Automated : 20 K/uL  Mean Cell Volume : 91.0 fl  Mean Cell Hemoglobin : 30.5 pg  Mean Cell Hemoglobin Concentration : 33.5 gm/dL  Auto Neutrophil # : x  Auto Lymphocyte # : x  Auto Monocyte # : x  Auto Eosinophil # : x  Auto Basophil # : x  Auto Neutrophil % : x  Auto Lymphocyte % : x  Auto Monocyte % : x  Auto Eosinophil % : x  Auto Basophil % : x                          7.1    <0.10 )-----------( 20       ( 15 Oct 2019 06:53 )             21.2     10-15    139  |  104  |  20  ----------------------------<  92  3.5   |  22  |  1.02    Ca    8.8      15 Oct 2019 06:53  Phos  3.4     10-15  Mg     1.6     10-15    TPro  5.8<L>  /  Alb  3.3  /  TBili  0.6  /  DBili  x   /  AST  14  /  ALT  11  /  AlkPhos  61  10-15    PT/INR - ( 14 Oct 2019 06:55 )   PT: 13.6 sec;   INR: 1.19 ratio         PTT - ( 14 Oct 2019 06:55 )  PTT:30.5 sec  LIVER FUNCTIONS - ( 15 Oct 2019 06:53 )  Alb: 3.3 g/dL / Pro: 5.8 g/dL / ALK PHOS: 61 U/L / ALT: 11 U/L / AST: 14 U/L / GGT: x           Lactate Dehydrogenase, Serum: 144 U/L (10-15 @ 06:53)      Cultures:   Culture - Urine (10.13.19 @ 04:27)    Specimen Source: .Urine    Culture Results:   No growth    Culture - Blood (10.12.19 @ 22:28)    Specimen Source: .Blood    Culture Results:   No growth to date.      Radiology:   < from: Xray Chest 1 View- PORTABLE-Routine (10.12.19 @ 09:00) >   Clear lungs.      Meds:   Antimicrobials:   acyclovir   Oral Tab/Cap 400 milliGRAM(s) Oral every 8 hours  cefepime   IVPB 2000 milliGRAM(s) IV Intermittent every 8 hours  clotrimazole Lozenge 1 Lozenge Oral five times a day  voriconazole 200 milliGRAM(s) Oral every 12 hours      Heme / Onc:   heparin  Infusion 339 Unit(s)/Hr IV Continuous <Continuous>      GI:  docusate sodium 100 milliGRAM(s) Oral three times a day  pantoprazole    Tablet 40 milliGRAM(s) Oral before breakfast  senna 2 Tablet(s) Oral at bedtime  sodium bicarbonate Mouth Rinse 10 milliLiter(s) Swish and Spit five times a day  ursodiol Capsule 300 milliGRAM(s) Oral two times a day with meals      Cardiovascular:   enalapril 2.5 milliGRAM(s) Oral daily  metoprolol succinate ER 12.5 milliGRAM(s) Oral two times a day  tamsulosin 0.4 milliGRAM(s) Oral at bedtime      Immunologic:   filgrastim-sndz Injectable 480 MICROGram(s) SubCutaneous daily  immune   globulin 10% (GAMMAGARD) IVPB 30 Gram(s) IV Intermittent <User Schedule>  mycophenolate mofetil 1000 milliGRAM(s) Oral <User Schedule>  tacrolimus 1 milliGRAM(s) Oral two times a day      Other medications:   acetaminophen   Tablet .. 650 milliGRAM(s) Oral every 6 hours  acetaminophen   Tablet .. 650 milliGRAM(s) Oral <User Schedule>  ALPRAZolam 0.5 milliGRAM(s) Oral at bedtime  Biotene Dry Mouth Oral Rinse 5 milliLiter(s) Swish and Spit five times a day  chlorhexidine 4% Liquid 1 Application(s) Topical <User Schedule>  diphenhydrAMINE   Injectable 25 milliGRAM(s) IV Push <User Schedule>  finasteride 5 milliGRAM(s) Oral daily  folic acid 1 milliGRAM(s) Oral daily  lidocaine/prilocaine Cream 1 Application(s) Topical daily  multivitamin 1 Tablet(s) Oral daily  ondansetron Injectable 8 milliGRAM(s) IV Push every 8 hours  sertraline 50 milliGRAM(s) Oral at bedtime  sodium chloride 0.45% 1000 milliLiter(s) IV Continuous <Continuous>  sodium chloride 0.45%. 1000 milliLiter(s) IV Continuous <Continuous>  sodium chloride 0.9%. 1000 milliLiter(s) IV Continuous <Continuous>      PRN:   acetaminophen   Tablet .. 650 milliGRAM(s) Oral every 6 hours PRN  acetaminophen  IVPB .. 1000 milliGRAM(s) IV Intermittent once PRN  diphenhydrAMINE   Injectable 25 milliGRAM(s) IV Push every 4 hours PRN  metoclopramide Injectable 10 milliGRAM(s) IV Push every 6 hours PRN  sodium chloride 0.9% lock flush 10 milliLiter(s) IV Push every 1 hour PRN      A/P:   59 year old male with a history of AML  Pre :  Allogeneic PBSCT day + 6  Strict I&O, daily weights, prn diuresis   Cardiology following. Continue current plan.   UA and BC from 10/10 (-)  Cytoxan 10/12,13  10/14- Start Tacro, Cellcept and Zarxio today.    1. Infectious Disease:   acyclovir   Oral Tab/Cap 400 milliGRAM(s) Oral every 8 hours  cefepime   IVPB 2000 milliGRAM(s) IV Intermittent every 8 hours  clotrimazole Lozenge 1 Lozenge Oral five times a day  voriconazole 200 milliGRAM(s) Oral every 12 hours    2. VOD Prophylaxis: Actigall, Glutamine, Heparin (dosed at 100 units / kg / day)     3. GI Prophylaxis:   pantoprazole    Tablet 40 milliGRAM(s) Oral before breakfast    4. Mouthcare - NS / NaHCO3 rinses, Mycelex, Biotene; Skin care     5. GVHD prophylaxis   mycophenolate mofetil 1000 milliGRAM(s) Oral <User Schedule>  tacrolimus 1 milliGRAM(s) Oral two times a day    6. Transfuse & replete electrolytes prn   magnesium oxide  400mg po TID     7. IV hydration, daily weights, strict I&O, prn diuresis     8. PO intake as tolerated, nutrition follow up as needed, MVI, folic acid     9. Antiemetics, anti-diarrhea medications:   metoclopramide Injectable 10 milliGRAM(s) IV Push every 6 hours PRN  ondansetron Injectable 8 milliGRAM(s) IV Push every 8 hours  aprepitant 80 milliGRAM(s) Oral every 24 hours    10. OOB as tolerated, physical therapy consult if needed     11. Monitor coags / fibrinogen 2x week, vitamin K as needed     12. Monitor closely for clinical changes, monitor for fevers     13. Emotional support provided, plan of care discussed and questions addressed     14. Patient education done regarding plan of care, restrictions and discharge planning     15. Continue regular social work input       I have written the above note for Dr. Gaytan who performed service with me in the room.   Brandon Hansen  NP-C ([phone])    I have seen and examined patient with NP, I agree with above note as scribed. 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    S: Patient seen and examined with HPC Transplant Team:   + fatigue   No other complaints   Denies mouth / tongue / throat pain, dyspnea, cough, nausea, vomiting, diarrhea, abdominal pain                                                                                                                                                                                                                                                                                                    O: Vitals:   Vital Signs Last 24 Hrs  T(C): 36.3 (15 Oct 2019 06:00), Max: 36.6 (14 Oct 2019 09:56)  T(F): 97.3 (15 Oct 2019 06:00), Max: 97.9 (14 Oct 2019 09:56)  HR: 84 (15 Oct 2019 06:00) (83 - 97)  BP: 118/68 (15 Oct 2019 06:00) (106/64 - 154/89)  BP(mean): --  RR: 18 (15 Oct 2019 06:00) (18 - 18)  SpO2: 98% (14 Oct 2019 21:30) (97% - 98%)    Admit weight:  81.5kg  Daily Weight in k.4 (14 Oct 2019 09:56)    Intake / Output:   10-14 @ 07:01  -  10-15 @ 07:00  --------------------------------------------------------  IN: 4479.7 mL / OUT: 5500 mL / NET: -1020.3 mL          PE:   Oropharynx: no ulceration or erythema  Oral Mucositis:       none.  Dry                                             Grade:   CVS: reg S1 S2  Lungs: CTA  Abdomen: soft, + normoactive BS, NT, ND  Extremities: No C/C/E.  Toe amputation  Gastric Mucositis:     none                                             Grade:   Intestinal Mucositis:      none                                        Grade:   Skin: no rash  TLC:  C/D/I  Neuro: Alert and oriented x3  Pain: none     Labs:   CBC Full  -  ( 15 Oct 2019 06:53 )  WBC Count : <0.10 K/uL  Hemoglobin : 7.1 g/dL  Hematocrit : 21.2 %  Platelet Count - Automated : 20 K/uL  Mean Cell Volume : 91.0 fl  Mean Cell Hemoglobin : 30.5 pg  Mean Cell Hemoglobin Concentration : 33.5 gm/dL  Auto Neutrophil # : x  Auto Lymphocyte # : x  Auto Monocyte # : x  Auto Eosinophil # : x  Auto Basophil # : x  Auto Neutrophil % : x  Auto Lymphocyte % : x  Auto Monocyte % : x  Auto Eosinophil % : x  Auto Basophil % : x                          7.1    <0.10 )-----------( 20       ( 15 Oct 2019 06:53 )             21.2     10-15    139  |  104  |  20  ----------------------------<  92  3.5   |  22  |  1.02    Ca    8.8      15 Oct 2019 06:53  Phos  3.4     10-15  Mg     1.6     10-15    TPro  5.8<L>  /  Alb  3.3  /  TBili  0.6  /  DBili  x   /  AST  14  /  ALT  11  /  AlkPhos  61  10-15    PT/INR - ( 14 Oct 2019 06:55 )   PT: 13.6 sec;   INR: 1.19 ratio         PTT - ( 14 Oct 2019 06:55 )  PTT:30.5 sec  LIVER FUNCTIONS - ( 15 Oct 2019 06:53 )  Alb: 3.3 g/dL / Pro: 5.8 g/dL / ALK PHOS: 61 U/L / ALT: 11 U/L / AST: 14 U/L / GGT: x           Lactate Dehydrogenase, Serum: 144 U/L (10-15 @ 06:53)      Cultures:   Culture - Urine (10.13.19 @ 04:27)    Specimen Source: .Urine    Culture Results:   No growth    Culture - Blood (10.12.19 @ 22:28)    Specimen Source: .Blood    Culture Results:   No growth to date.      Radiology:   < from: Xray Chest 1 View- PORTABLE-Routine (10.12.19 @ 09:00) >   Clear lungs.      Meds:   Antimicrobials:   acyclovir   Oral Tab/Cap 400 milliGRAM(s) Oral every 8 hours  cefepime   IVPB 2000 milliGRAM(s) IV Intermittent every 8 hours  clotrimazole Lozenge 1 Lozenge Oral five times a day  voriconazole 200 milliGRAM(s) Oral every 12 hours      Heme / Onc:   heparin  Infusion 339 Unit(s)/Hr IV Continuous <Continuous>      GI:  docusate sodium 100 milliGRAM(s) Oral three times a day  pantoprazole    Tablet 40 milliGRAM(s) Oral before breakfast  senna 2 Tablet(s) Oral at bedtime  sodium bicarbonate Mouth Rinse 10 milliLiter(s) Swish and Spit five times a day  ursodiol Capsule 300 milliGRAM(s) Oral two times a day with meals      Cardiovascular:   enalapril 2.5 milliGRAM(s) Oral daily  metoprolol succinate ER 12.5 milliGRAM(s) Oral two times a day  tamsulosin 0.4 milliGRAM(s) Oral at bedtime      Immunologic:   filgrastim-sndz Injectable 480 MICROGram(s) SubCutaneous daily  immune   globulin 10% (GAMMAGARD) IVPB 30 Gram(s) IV Intermittent <User Schedule>  mycophenolate mofetil 1000 milliGRAM(s) Oral <User Schedule>  tacrolimus 1 milliGRAM(s) Oral two times a day      Other medications:   acetaminophen   Tablet .. 650 milliGRAM(s) Oral every 6 hours  acetaminophen   Tablet .. 650 milliGRAM(s) Oral <User Schedule>  ALPRAZolam 0.5 milliGRAM(s) Oral at bedtime  Biotene Dry Mouth Oral Rinse 5 milliLiter(s) Swish and Spit five times a day  chlorhexidine 4% Liquid 1 Application(s) Topical <User Schedule>  diphenhydrAMINE   Injectable 25 milliGRAM(s) IV Push <User Schedule>  finasteride 5 milliGRAM(s) Oral daily  folic acid 1 milliGRAM(s) Oral daily  lidocaine/prilocaine Cream 1 Application(s) Topical daily  multivitamin 1 Tablet(s) Oral daily  ondansetron Injectable 8 milliGRAM(s) IV Push every 8 hours  sertraline 50 milliGRAM(s) Oral at bedtime  sodium chloride 0.45% 1000 milliLiter(s) IV Continuous <Continuous>  sodium chloride 0.45%. 1000 milliLiter(s) IV Continuous <Continuous>  sodium chloride 0.9%. 1000 milliLiter(s) IV Continuous <Continuous>      PRN:   acetaminophen   Tablet .. 650 milliGRAM(s) Oral every 6 hours PRN  acetaminophen  IVPB .. 1000 milliGRAM(s) IV Intermittent once PRN  diphenhydrAMINE   Injectable 25 milliGRAM(s) IV Push every 4 hours PRN  metoclopramide Injectable 10 milliGRAM(s) IV Push every 6 hours PRN  sodium chloride 0.9% lock flush 10 milliLiter(s) IV Push every 1 hour PRN      A/P:   59 year old male with a history of AML  Pre :  Allogeneic PBSCT day + 6  Strict I&O, daily weights, prn diuresis   Cardiology following. Continue current plan.   UA and BC from 10/10 (-)  Cytoxan 10/12,13  10/14- Start Tacro, Cellcept and Zarxio today.    1. Infectious Disease:   acyclovir   Oral Tab/Cap 400 milliGRAM(s) Oral every 8 hours  cefepime   IVPB 2000 milliGRAM(s) IV Intermittent every 8 hours  clotrimazole Lozenge 1 Lozenge Oral five times a day  voriconazole 200 milliGRAM(s) Oral every 12 hours    2. VOD Prophylaxis: Actigall, Glutamine, Heparin (dosed at 100 units / kg / day)     3. GI Prophylaxis:   pantoprazole    Tablet 40 milliGRAM(s) Oral before breakfast    4. Mouthcare - NS / NaHCO3 rinses, Mycelex, Biotene; Skin care     5. GVHD prophylaxis   mycophenolate mofetil 1000 milliGRAM(s) Oral <User Schedule>  tacrolimus 1 milliGRAM(s) Oral two times a day    6. Transfuse & replete electrolytes prn   magnesium oxide  400mg po TID     7. IV hydration, daily weights, strict I&O, prn diuresis     8. PO intake as tolerated, nutrition follow up as needed, MVI, folic acid     9. Antiemetics, anti-diarrhea medications:   metoclopramide Injectable 10 milliGRAM(s) IV Push every 6 hours PRN  ondansetron Injectable 8 milliGRAM(s) IV Push every 8 hours  aprepitant 80 milliGRAM(s) Oral every 24 hours    10. OOB as tolerated, physical therapy consult if needed     11. Monitor coags / fibrinogen 2x week, vitamin K as needed     12. Monitor closely for clinical changes, monitor for fevers     13. Emotional support provided, plan of care discussed and questions addressed     14. Patient education done regarding plan of care, restrictions and discharge planning     15. Continue regular social work input       I have written the above note for Dr. Gaytan who performed service with me in the room.   Brandon Hansen  NP-C ([phone])    I have seen and examined patient with NP, I agree with above note as scribed.

## 2019-10-16 LAB
ALBUMIN SERPL ELPH-MCNC: 3.2 G/DL — LOW (ref 3.3–5)
ALP SERPL-CCNC: 61 U/L — SIGNIFICANT CHANGE UP (ref 40–120)
ALT FLD-CCNC: 13 U/L — SIGNIFICANT CHANGE UP (ref 10–45)
ANION GAP SERPL CALC-SCNC: 11 MMOL/L — SIGNIFICANT CHANGE UP (ref 5–17)
AST SERPL-CCNC: 12 U/L — SIGNIFICANT CHANGE UP (ref 10–40)
BILIRUB DIRECT SERPL-MCNC: 0.1 MG/DL — SIGNIFICANT CHANGE UP (ref 0–0.2)
BILIRUB INDIRECT FLD-MCNC: 0.4 MG/DL — SIGNIFICANT CHANGE UP (ref 0.2–1)
BILIRUB SERPL-MCNC: 0.5 MG/DL — SIGNIFICANT CHANGE UP (ref 0.2–1.2)
BLD GP AB SCN SERPL QL: NEGATIVE — SIGNIFICANT CHANGE UP
BUN SERPL-MCNC: 20 MG/DL — SIGNIFICANT CHANGE UP (ref 7–23)
CALCIUM SERPL-MCNC: 9 MG/DL — SIGNIFICANT CHANGE UP (ref 8.4–10.5)
CHLORIDE SERPL-SCNC: 108 MMOL/L — SIGNIFICANT CHANGE UP (ref 96–108)
CO2 SERPL-SCNC: 21 MMOL/L — LOW (ref 22–31)
CREAT SERPL-MCNC: 0.93 MG/DL — SIGNIFICANT CHANGE UP (ref 0.5–1.3)
ENGRAFTMENT PRE: SIGNIFICANT CHANGE UP
GLUCOSE SERPL-MCNC: 98 MG/DL — SIGNIFICANT CHANGE UP (ref 70–99)
HCT VFR BLD CALC: 20.9 % — CRITICAL LOW (ref 39–50)
HGB BLD-MCNC: 6.8 G/DL — CRITICAL LOW (ref 13–17)
LDH SERPL L TO P-CCNC: 142 U/L — SIGNIFICANT CHANGE UP (ref 50–242)
MAGNESIUM SERPL-MCNC: 1.8 MG/DL — SIGNIFICANT CHANGE UP (ref 1.6–2.6)
MCHC RBC-ENTMCNC: 30.1 PG — SIGNIFICANT CHANGE UP (ref 27–34)
MCHC RBC-ENTMCNC: 32.5 GM/DL — SIGNIFICANT CHANGE UP (ref 32–36)
MCV RBC AUTO: 92.5 FL — SIGNIFICANT CHANGE UP (ref 80–100)
NRBC # BLD: 33 /100 WBCS — HIGH (ref 0–0)
PHOSPHATE SERPL-MCNC: 2.7 MG/DL — SIGNIFICANT CHANGE UP (ref 2.5–4.5)
PLATELET # BLD AUTO: 12 K/UL — CRITICAL LOW (ref 150–400)
POTASSIUM SERPL-MCNC: 4 MMOL/L — SIGNIFICANT CHANGE UP (ref 3.5–5.3)
POTASSIUM SERPL-SCNC: 4 MMOL/L — SIGNIFICANT CHANGE UP (ref 3.5–5.3)
PROT SERPL-MCNC: 5.8 G/DL — LOW (ref 6–8.3)
RBC # BLD: 2.26 M/UL — LOW (ref 4.2–5.8)
RBC # FLD: 18.3 % — HIGH (ref 10.3–14.5)
RH IG SCN BLD-IMP: POSITIVE — SIGNIFICANT CHANGE UP
SODIUM SERPL-SCNC: 140 MMOL/L — SIGNIFICANT CHANGE UP (ref 135–145)
WBC # BLD: <0.1 K/UL — CRITICAL LOW (ref 3.8–10.5)
WBC # FLD AUTO: <0.1 K/UL — CRITICAL LOW (ref 3.8–10.5)

## 2019-10-16 PROCEDURE — 99291 CRITICAL CARE FIRST HOUR: CPT

## 2019-10-16 PROCEDURE — 99232 SBSQ HOSP IP/OBS MODERATE 35: CPT

## 2019-10-16 RX ORDER — SERTRALINE 25 MG/1
1 TABLET, FILM COATED ORAL
Qty: 0 | Refills: 0 | DISCHARGE
Start: 2019-10-16

## 2019-10-16 RX ORDER — URSODIOL 250 MG/1
1 TABLET, FILM COATED ORAL
Qty: 60 | Refills: 3
Start: 2019-10-16 | End: 2020-02-12

## 2019-10-16 RX ORDER — VORICONAZOLE 10 MG/ML
1 INJECTION, POWDER, LYOPHILIZED, FOR SOLUTION INTRAVENOUS
Qty: 60 | Refills: 3
Start: 2019-10-16 | End: 2020-02-12

## 2019-10-16 RX ORDER — ATOVAQUONE 750 MG/5ML
5 SUSPENSION ORAL
Qty: 300 | Refills: 3
Start: 2019-10-16 | End: 2020-02-12

## 2019-10-16 RX ORDER — PANTOPRAZOLE SODIUM 20 MG/1
1 TABLET, DELAYED RELEASE ORAL
Qty: 30 | Refills: 3
Start: 2019-10-16 | End: 2020-02-12

## 2019-10-16 RX ORDER — MAGNESIUM OXIDE 400 MG ORAL TABLET 241.3 MG
1 TABLET ORAL
Qty: 90 | Refills: 3
Start: 2019-10-16 | End: 2020-02-12

## 2019-10-16 RX ORDER — ACYCLOVIR SODIUM 500 MG
1 VIAL (EA) INTRAVENOUS
Qty: 90 | Refills: 3
Start: 2019-10-16 | End: 2020-02-12

## 2019-10-16 RX ORDER — TACROLIMUS 5 MG/1
2 CAPSULE ORAL
Qty: 120 | Refills: 3
Start: 2019-10-16 | End: 2020-02-12

## 2019-10-16 RX ORDER — METOCLOPRAMIDE HCL 10 MG
1 TABLET ORAL
Qty: 60 | Refills: 0
Start: 2019-10-16 | End: 2019-10-30

## 2019-10-16 RX ORDER — MYCOPHENOLATE MOFETIL 250 MG/1
2 CAPSULE ORAL
Qty: 180 | Refills: 0
Start: 2019-10-16 | End: 2019-11-14

## 2019-10-16 RX ORDER — ONDANSETRON 8 MG/1
1 TABLET, FILM COATED ORAL
Qty: 45 | Refills: 0
Start: 2019-10-16 | End: 2019-10-30

## 2019-10-16 RX ORDER — FOLIC ACID 0.8 MG
1 TABLET ORAL
Qty: 30 | Refills: 3
Start: 2019-10-16 | End: 2020-02-12

## 2019-10-16 RX ORDER — FINASTERIDE 5 MG/1
1 TABLET, FILM COATED ORAL
Qty: 0 | Refills: 0 | DISCHARGE
Start: 2019-10-16

## 2019-10-16 RX ADMIN — Medication 400 MILLIGRAM(S): at 21:09

## 2019-10-16 RX ADMIN — Medication 100 MILLIGRAM(S): at 13:36

## 2019-10-16 RX ADMIN — Medication 1 LOZENGE: at 23:16

## 2019-10-16 RX ADMIN — FINASTERIDE 5 MILLIGRAM(S): 5 TABLET, FILM COATED ORAL at 16:52

## 2019-10-16 RX ADMIN — CEFEPIME 100 MILLIGRAM(S): 1 INJECTION, POWDER, FOR SOLUTION INTRAMUSCULAR; INTRAVENOUS at 13:35

## 2019-10-16 RX ADMIN — TACROLIMUS 1 MILLIGRAM(S): 5 CAPSULE ORAL at 17:27

## 2019-10-16 RX ADMIN — Medication 5 MILLILITER(S): at 08:22

## 2019-10-16 RX ADMIN — Medication 480 MICROGRAM(S): at 13:37

## 2019-10-16 RX ADMIN — Medication 1 LOZENGE: at 19:27

## 2019-10-16 RX ADMIN — URSODIOL 300 MILLIGRAM(S): 250 TABLET, FILM COATED ORAL at 17:27

## 2019-10-16 RX ADMIN — Medication 2.5 MILLIGRAM(S): at 06:43

## 2019-10-16 RX ADMIN — Medication 10 MILLILITER(S): at 08:15

## 2019-10-16 RX ADMIN — Medication 5 MILLILITER(S): at 19:26

## 2019-10-16 RX ADMIN — Medication 0.5 MILLIGRAM(S): at 21:09

## 2019-10-16 RX ADMIN — SERTRALINE 50 MILLIGRAM(S): 25 TABLET, FILM COATED ORAL at 21:09

## 2019-10-16 RX ADMIN — Medication 5 MILLILITER(S): at 13:39

## 2019-10-16 RX ADMIN — CEFEPIME 100 MILLIGRAM(S): 1 INJECTION, POWDER, FOR SOLUTION INTRAMUSCULAR; INTRAVENOUS at 06:43

## 2019-10-16 RX ADMIN — Medication 400 MILLIGRAM(S): at 14:48

## 2019-10-16 RX ADMIN — Medication 1 TABLET(S): at 14:50

## 2019-10-16 RX ADMIN — TACROLIMUS 1 MILLIGRAM(S): 5 CAPSULE ORAL at 06:44

## 2019-10-16 RX ADMIN — VORICONAZOLE 200 MILLIGRAM(S): 10 INJECTION, POWDER, LYOPHILIZED, FOR SOLUTION INTRAVENOUS at 17:27

## 2019-10-16 RX ADMIN — Medication 1 MILLIGRAM(S): at 14:51

## 2019-10-16 RX ADMIN — MYCOPHENOLATE MOFETIL 1000 MILLIGRAM(S): 250 CAPSULE ORAL at 14:47

## 2019-10-16 RX ADMIN — HEPARIN SODIUM 3.39 UNIT(S)/HR: 5000 INJECTION INTRAVENOUS; SUBCUTANEOUS at 19:27

## 2019-10-16 RX ADMIN — MAGNESIUM OXIDE 400 MG ORAL TABLET 400 MILLIGRAM(S): 241.3 TABLET ORAL at 14:46

## 2019-10-16 RX ADMIN — Medication 400 MILLIGRAM(S): at 06:43

## 2019-10-16 RX ADMIN — MYCOPHENOLATE MOFETIL 1000 MILLIGRAM(S): 250 CAPSULE ORAL at 21:26

## 2019-10-16 RX ADMIN — CHLORHEXIDINE GLUCONATE 1 APPLICATION(S): 213 SOLUTION TOPICAL at 08:21

## 2019-10-16 RX ADMIN — Medication 10 MILLILITER(S): at 23:16

## 2019-10-16 RX ADMIN — Medication 10 MILLILITER(S): at 19:27

## 2019-10-16 RX ADMIN — Medication 10 MILLILITER(S): at 12:58

## 2019-10-16 RX ADMIN — TAMSULOSIN HYDROCHLORIDE 0.4 MILLIGRAM(S): 0.4 CAPSULE ORAL at 21:09

## 2019-10-16 RX ADMIN — PANTOPRAZOLE SODIUM 40 MILLIGRAM(S): 20 TABLET, DELAYED RELEASE ORAL at 06:42

## 2019-10-16 RX ADMIN — SODIUM CHLORIDE 20 MILLILITER(S): 9 INJECTION INTRAMUSCULAR; INTRAVENOUS; SUBCUTANEOUS at 19:27

## 2019-10-16 RX ADMIN — MYCOPHENOLATE MOFETIL 1000 MILLIGRAM(S): 250 CAPSULE ORAL at 08:17

## 2019-10-16 RX ADMIN — MAGNESIUM OXIDE 400 MG ORAL TABLET 400 MILLIGRAM(S): 241.3 TABLET ORAL at 17:26

## 2019-10-16 RX ADMIN — URSODIOL 300 MILLIGRAM(S): 250 TABLET, FILM COATED ORAL at 08:16

## 2019-10-16 RX ADMIN — Medication 5 MILLILITER(S): at 17:27

## 2019-10-16 RX ADMIN — MAGNESIUM OXIDE 400 MG ORAL TABLET 400 MILLIGRAM(S): 241.3 TABLET ORAL at 08:16

## 2019-10-16 RX ADMIN — Medication 5 MILLILITER(S): at 23:16

## 2019-10-16 RX ADMIN — Medication 10 MILLILITER(S): at 16:50

## 2019-10-16 RX ADMIN — Medication 12.5 MILLIGRAM(S): at 06:42

## 2019-10-16 RX ADMIN — Medication 1 LOZENGE: at 16:52

## 2019-10-16 RX ADMIN — VORICONAZOLE 200 MILLIGRAM(S): 10 INJECTION, POWDER, LYOPHILIZED, FOR SOLUTION INTRAVENOUS at 06:42

## 2019-10-16 RX ADMIN — Medication 12.5 MILLIGRAM(S): at 17:25

## 2019-10-16 RX ADMIN — CEFEPIME 100 MILLIGRAM(S): 1 INJECTION, POWDER, FOR SOLUTION INTRAMUSCULAR; INTRAVENOUS at 21:09

## 2019-10-16 RX ADMIN — Medication 25 MILLIGRAM(S): at 09:30

## 2019-10-16 RX ADMIN — Medication 1 LOZENGE: at 14:45

## 2019-10-16 RX ADMIN — Medication 1 LOZENGE: at 08:16

## 2019-10-16 NOTE — PROGRESS NOTE ADULT - SUBJECTIVE AND OBJECTIVE BOX
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    S: Patient seen and examined with HPC Transplant Team:   + fatigue   No other complaints   Denies mouth / tongue / throat pain, dyspnea, cough, nausea, vomiting, diarrhea, abdominal pain     O: Vitals:   Vital Signs Last 24 Hrs  T(C): 36.6 (16 Oct 2019 06:13), Max: 36.6 (16 Oct 2019 06:13)  T(F): 97.9 (16 Oct 2019 06:13), Max: 97.9 (16 Oct 2019 06:13)  HR: 78 (16 Oct 2019 06:13) (78 - 114)  BP: 114/72 (16 Oct 2019 06:13) (114/72 - 161/94)  BP(mean): --  RR: 18 (16 Oct 2019 06:13) (18 - 18)  SpO2: 95% (16 Oct 2019 06:13) (95% - 100%)    Admit weight:  81.5kg  Daily     Daily Weight in k.8 (15 Oct 2019 10:47)    Intake / Output:   10-15 @ 07:01  -  10-16 @ 07:00  --------------------------------------------------------  IN: 2083 mL / OUT: 1500 mL / NET: 583 mL      PE:   Oropharynx: no ulceration or erythema  Oral Mucositis:       none.  Dry                                             Grade:   CVS: reg S1 S2  Lungs: CTA  Abdomen: soft, + normoactive BS, NT, ND  Extremities: No C/C/E.  Toe amputation  Gastric Mucositis:     none                                             Grade:   Intestinal Mucositis:      none                                        Grade:   Skin: no rash  TLC:  C/D/I  Neuro: Alert and oriented x3  Pain: none       Labs:     Meds:   Antimicrobials:   acyclovir   Oral Tab/Cap 400 milliGRAM(s) Oral every 8 hours  cefepime   IVPB 2000 milliGRAM(s) IV Intermittent every 8 hours  clotrimazole Lozenge 1 Lozenge Oral five times a day  voriconazole 200 milliGRAM(s) Oral every 12 hours      Heme / Onc:   heparin  Infusion 339 Unit(s)/Hr IV Continuous <Continuous>      GI:  docusate sodium 100 milliGRAM(s) Oral three times a day  pantoprazole    Tablet 40 milliGRAM(s) Oral before breakfast  senna 2 Tablet(s) Oral at bedtime  sodium bicarbonate Mouth Rinse 10 milliLiter(s) Swish and Spit five times a day  ursodiol Capsule 300 milliGRAM(s) Oral two times a day with meals      Cardiovascular:   enalapril 2.5 milliGRAM(s) Oral daily  metoprolol succinate ER 12.5 milliGRAM(s) Oral two times a day  tamsulosin 0.4 milliGRAM(s) Oral at bedtime      Immunologic:   filgrastim-sndz Injectable 480 MICROGram(s) SubCutaneous daily  immune   globulin 10% (GAMMAGARD) IVPB 30 Gram(s) IV Intermittent <User Schedule>  mycophenolate mofetil 1000 milliGRAM(s) Oral <User Schedule>  tacrolimus 1 milliGRAM(s) Oral two times a day      Other medications:   acetaminophen   Tablet .. 650 milliGRAM(s) Oral every 6 hours  acetaminophen   Tablet .. 650 milliGRAM(s) Oral <User Schedule>  ALPRAZolam 0.5 milliGRAM(s) Oral at bedtime  Biotene Dry Mouth Oral Rinse 5 milliLiter(s) Swish and Spit five times a day  chlorhexidine 4% Liquid 1 Application(s) Topical <User Schedule>  diphenhydrAMINE   Injectable 25 milliGRAM(s) IV Push <User Schedule>  finasteride 5 milliGRAM(s) Oral daily  folic acid 1 milliGRAM(s) Oral daily  lidocaine/prilocaine Cream 1 Application(s) Topical daily  magnesium oxide 400 milliGRAM(s) Oral three times a day with meals  multivitamin 1 Tablet(s) Oral daily  sertraline 50 milliGRAM(s) Oral at bedtime  sodium chloride 0.9%. 1000 milliLiter(s) IV Continuous <Continuous>      PRN:   acetaminophen   Tablet .. 650 milliGRAM(s) Oral every 6 hours PRN  acetaminophen  IVPB .. 1000 milliGRAM(s) IV Intermittent once PRN  diphenhydrAMINE   Injectable 25 milliGRAM(s) IV Push every 4 hours PRN  metoclopramide Injectable 10 milliGRAM(s) IV Push every 6 hours PRN  sodium chloride 0.9% lock flush 10 milliLiter(s) IV Push every 1 hour PRN      A/P:   59 year old male with a history of AML  Pre :  Allogeneic PBSCT day +7   Strict I&O, daily weights, prn diuresis   Cardiology following. Continue current plan.   UA and BC from 10/10 (-)  Cytoxan 10/12,13  10/14- Start Tacro, Cellcept and Zarxio today.    1. Infectious Disease:   acyclovir   Oral Tab/Cap 400 milliGRAM(s) Oral every 8 hours  cefepime   IVPB 2000 milliGRAM(s) IV Intermittent every 8 hours  clotrimazole Lozenge 1 Lozenge Oral five times a day  voriconazole 200 milliGRAM(s) Oral every 12 hours    2. VOD Prophylaxis: Actigall, Glutamine, Heparin (dosed at 100 units / kg / day)     3. GI Prophylaxis:   pantoprazole    Tablet 40 milliGRAM(s) Oral before breakfast    4. Mouthcare - NS / NaHCO3 rinses, Mycelex, Biotene; Skin care     5. GVHD prophylaxis   mycophenolate mofetil 1000 milliGRAM(s) Oral <User Schedule>  tacrolimus 1 milliGRAM(s) Oral two times a day    6. Transfuse & replete electrolytes prn   magnesium oxide  400mg po TID     7. IV hydration, daily weights, strict I&O, prn diuresis     8. PO intake as tolerated, nutrition follow up as needed, MVI, folic acid     9. Antiemetics, anti-diarrhea medications:   metoclopramide Injectable 10 milliGRAM(s) IV Push every 6 hours PRN  ondansetron Injectable 8 milliGRAM(s) IV Push every 8 hours  aprepitant 80 milliGRAM(s) Oral every 24 hours    10. OOB as tolerated, physical therapy consult if needed     11. Monitor coags / fibrinogen 2x week, vitamin K as needed     12. Monitor closely for clinical changes, monitor for fevers     13. Emotional support provided, plan of care discussed and questions addressed     14. Patient education done regarding plan of care, restrictions and discharge planning     15. Continue regular social work input       I have written the above note for Dr. Gaytan who performed service with me in the room.   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    S: Patient seen and examined with HPC Transplant Team:   + fatigue   No other complaints   Denies mouth / tongue / throat pain, dyspnea, cough, nausea, vomiting, diarrhea, abdominal pain     O: Vitals:   Vital Signs Last 24 Hrs  T(C): 36.6 (16 Oct 2019 06:13), Max: 36.6 (16 Oct 2019 06:13)  T(F): 97.9 (16 Oct 2019 06:13), Max: 97.9 (16 Oct 2019 06:13)  HR: 78 (16 Oct 2019 06:13) (78 - 114)  BP: 114/72 (16 Oct 2019 06:13) (114/72 - 161/94)  BP(mean): --  RR: 18 (16 Oct 2019 06:13) (18 - 18)  SpO2: 95% (16 Oct 2019 06:13) (95% - 100%)    Admit weight:  81.5kg  Daily Weight in k.8 (15 Oct 2019 10:47)  Today's weight: 79kg     Intake / Output:   10-15 @ 07:01  -  10-16 @ 07:00  --------------------------------------------------------  IN: 2083 mL / OUT: 1500 mL / NET: 583 mL      PE:   Oropharynx: no ulceration or erythema  Oral Mucositis:       none.  Dry                                             Grade:   CVS: reg S1 S2  Lungs: CTA  Abdomen: soft, + normoactive BS, NT, ND  Extremities: No C/C/E.  Toe amputation  Gastric Mucositis:     none                                             Grade:   Intestinal Mucositis:      none                                        Grade:   Skin: no rash  TLC:  C/D/I  Neuro: Alert and oriented x3  Pain: none       Labs:     Meds:   Antimicrobials:   acyclovir   Oral Tab/Cap 400 milliGRAM(s) Oral every 8 hours  cefepime   IVPB 2000 milliGRAM(s) IV Intermittent every 8 hours  clotrimazole Lozenge 1 Lozenge Oral five times a day  voriconazole 200 milliGRAM(s) Oral every 12 hours      Heme / Onc:   heparin  Infusion 339 Unit(s)/Hr IV Continuous <Continuous>      GI:  docusate sodium 100 milliGRAM(s) Oral three times a day  pantoprazole    Tablet 40 milliGRAM(s) Oral before breakfast  senna 2 Tablet(s) Oral at bedtime  sodium bicarbonate Mouth Rinse 10 milliLiter(s) Swish and Spit five times a day  ursodiol Capsule 300 milliGRAM(s) Oral two times a day with meals      Cardiovascular:   enalapril 2.5 milliGRAM(s) Oral daily  metoprolol succinate ER 12.5 milliGRAM(s) Oral two times a day  tamsulosin 0.4 milliGRAM(s) Oral at bedtime      Immunologic:   filgrastim-sndz Injectable 480 MICROGram(s) SubCutaneous daily  immune   globulin 10% (GAMMAGARD) IVPB 30 Gram(s) IV Intermittent <User Schedule>  mycophenolate mofetil 1000 milliGRAM(s) Oral <User Schedule>  tacrolimus 1 milliGRAM(s) Oral two times a day      Other medications:   acetaminophen   Tablet .. 650 milliGRAM(s) Oral every 6 hours  acetaminophen   Tablet .. 650 milliGRAM(s) Oral <User Schedule>  ALPRAZolam 0.5 milliGRAM(s) Oral at bedtime  Biotene Dry Mouth Oral Rinse 5 milliLiter(s) Swish and Spit five times a day  chlorhexidine 4% Liquid 1 Application(s) Topical <User Schedule>  diphenhydrAMINE   Injectable 25 milliGRAM(s) IV Push <User Schedule>  finasteride 5 milliGRAM(s) Oral daily  folic acid 1 milliGRAM(s) Oral daily  lidocaine/prilocaine Cream 1 Application(s) Topical daily  magnesium oxide 400 milliGRAM(s) Oral three times a day with meals  multivitamin 1 Tablet(s) Oral daily  sertraline 50 milliGRAM(s) Oral at bedtime  sodium chloride 0.9%. 1000 milliLiter(s) IV Continuous <Continuous>      PRN:   acetaminophen   Tablet .. 650 milliGRAM(s) Oral every 6 hours PRN  acetaminophen  IVPB .. 1000 milliGRAM(s) IV Intermittent once PRN  diphenhydrAMINE   Injectable 25 milliGRAM(s) IV Push every 4 hours PRN  metoclopramide Injectable 10 milliGRAM(s) IV Push every 6 hours PRN  sodium chloride 0.9% lock flush 10 milliLiter(s) IV Push every 1 hour PRN      A/P:   59 year old male with a history of AML  Pre :  Allogeneic PBSCT day +7   Strict I&O, daily weights, prn diuresis   Cardiology following. Continue current plan.   UA and BC from 10/10 (-)  Cytoxan 10/12,13  10/14- Start Tacro, Cellcept and Zarxio today.    1. Infectious Disease:   acyclovir   Oral Tab/Cap 400 milliGRAM(s) Oral every 8 hours  cefepime   IVPB 2000 milliGRAM(s) IV Intermittent every 8 hours  clotrimazole Lozenge 1 Lozenge Oral five times a day  voriconazole 200 milliGRAM(s) Oral every 12 hours    2. VOD Prophylaxis: Actigall, Glutamine, Heparin (dosed at 100 units / kg / day)     3. GI Prophylaxis:   pantoprazole    Tablet 40 milliGRAM(s) Oral before breakfast    4. Mouthcare - NS / NaHCO3 rinses, Mycelex, Biotene; Skin care     5. GVHD prophylaxis   mycophenolate mofetil 1000 milliGRAM(s) Oral <User Schedule>  tacrolimus 1 milliGRAM(s) Oral two times a day    6. Transfuse & replete electrolytes prn   magnesium oxide  400mg po TID     7. IV hydration, daily weights, strict I&O, prn diuresis     8. PO intake as tolerated, nutrition follow up as needed, MVI, folic acid     9. Antiemetics, anti-diarrhea medications:   metoclopramide Injectable 10 milliGRAM(s) IV Push every 6 hours PRN  ondansetron Injectable 8 milliGRAM(s) IV Push every 8 hours  aprepitant 80 milliGRAM(s) Oral every 24 hours    10. OOB as tolerated, physical therapy consult if needed     11. Monitor coags / fibrinogen 2x week, vitamin K as needed     12. Monitor closely for clinical changes, monitor for fevers     13. Emotional support provided, plan of care discussed and questions addressed     14. Patient education done regarding plan of care, restrictions and discharge planning     15. Continue regular social work input       I have written the above note for Dr. Gaytan who performed service with me in the room.   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    S: Patient seen and examined with HPC Transplant Team:    No complaints   Denies mouth / tongue / throat pain, dyspnea, cough, nausea, vomiting, diarrhea, abdominal pain     O: Vitals:   Vital Signs Last 24 Hrs  T(C): 36.6 (16 Oct 2019 06:13), Max: 36.6 (16 Oct 2019 06:13)  T(F): 97.9 (16 Oct 2019 06:13), Max: 97.9 (16 Oct 2019 06:13)  HR: 78 (16 Oct 2019 06:13) (78 - 114)  BP: 114/72 (16 Oct 2019 06:13) (114/72 - 161/94)  BP(mean): --  RR: 18 (16 Oct 2019 06:13) (18 - 18)  SpO2: 95% (16 Oct 2019 06:13) (95% - 100%)    Admit weight:  81.5kg  Daily Weight in k.8 (15 Oct 2019 10:47)  Today's weight: 79kg     Intake / Output:   10-15 @ 07:01  -  10-16 @ 07:00  --------------------------------------------------------  IN: 2083 mL / OUT: 1500 mL / NET: 583 mL      PE:   Oropharynx: no ulceration or erythema  Oral Mucositis:       none.  Dry                                             Grade:   CVS: reg S1 S2  Lungs: CTA  Abdomen: soft, + normoactive BS, NT, ND  Extremities: No C/C/E.  Toe amputation  Gastric Mucositis:     none                                             Grade:   Intestinal Mucositis:      none                                        Grade:   Skin: no rash  TLC:  C/D/I  Neuro: Alert and oriented x3  Pain: none     LABS:    Blood Cultures:                           6.8    <0.10 )-----------( 12       ( 16 Oct 2019 07:13 )             20.9         Mean Cell Volume : 92.5 fl  Mean Cell Hemoglobin : 30.1 pg  Mean Cell Hemoglobin Concentration : 32.5 gm/dL  Auto Neutrophil # : x  Auto Lymphocyte # : x  Auto Monocyte # : x  Auto Eosinophil # : x  Auto Basophil # : x  Auto Neutrophil % : x  Auto Lymphocyte % : x  Auto Monocyte % : x  Auto Eosinophil % : x  Auto Basophil % : x      10-16    140  |  108  |  20  ----------------------------<  98  4.0   |  21<L>  |  0.93    Ca    9.0      16 Oct 2019 07:10  Phos  2.7     10-16  Mg     1.8     10-16    TPro  5.8<L>  /  Alb  3.2<L>  /  TBili  0.5  /  DBili  0.1  /  AST  12  /  ALT  13  /  AlkPhos  61  10-16      Mg 1.8  Phos 2.7    Uric Acid --      Meds:   Antimicrobials:   acyclovir   Oral Tab/Cap 400 milliGRAM(s) Oral every 8 hours  cefepime   IVPB 2000 milliGRAM(s) IV Intermittent every 8 hours  clotrimazole Lozenge 1 Lozenge Oral five times a day  voriconazole 200 milliGRAM(s) Oral every 12 hours      Heme / Onc:   heparin  Infusion 339 Unit(s)/Hr IV Continuous <Continuous>      GI:  docusate sodium 100 milliGRAM(s) Oral three times a day  pantoprazole    Tablet 40 milliGRAM(s) Oral before breakfast  senna 2 Tablet(s) Oral at bedtime  sodium bicarbonate Mouth Rinse 10 milliLiter(s) Swish and Spit five times a day  ursodiol Capsule 300 milliGRAM(s) Oral two times a day with meals      Cardiovascular:   enalapril 2.5 milliGRAM(s) Oral daily  metoprolol succinate ER 12.5 milliGRAM(s) Oral two times a day  tamsulosin 0.4 milliGRAM(s) Oral at bedtime      Immunologic:   filgrastim-sndz Injectable 480 MICROGram(s) SubCutaneous daily  immune   globulin 10% (GAMMAGARD) IVPB 30 Gram(s) IV Intermittent <User Schedule>  mycophenolate mofetil 1000 milliGRAM(s) Oral <User Schedule>  tacrolimus 1 milliGRAM(s) Oral two times a day      Other medications:   acetaminophen   Tablet .. 650 milliGRAM(s) Oral every 6 hours  acetaminophen   Tablet .. 650 milliGRAM(s) Oral <User Schedule>  ALPRAZolam 0.5 milliGRAM(s) Oral at bedtime  Biotene Dry Mouth Oral Rinse 5 milliLiter(s) Swish and Spit five times a day  chlorhexidine 4% Liquid 1 Application(s) Topical <User Schedule>  diphenhydrAMINE   Injectable 25 milliGRAM(s) IV Push <User Schedule>  finasteride 5 milliGRAM(s) Oral daily  folic acid 1 milliGRAM(s) Oral daily  lidocaine/prilocaine Cream 1 Application(s) Topical daily  magnesium oxide 400 milliGRAM(s) Oral three times a day with meals  multivitamin 1 Tablet(s) Oral daily  sertraline 50 milliGRAM(s) Oral at bedtime  sodium chloride 0.9%. 1000 milliLiter(s) IV Continuous <Continuous>      PRN:   acetaminophen   Tablet .. 650 milliGRAM(s) Oral every 6 hours PRN  acetaminophen  IVPB .. 1000 milliGRAM(s) IV Intermittent once PRN  diphenhydrAMINE   Injectable 25 milliGRAM(s) IV Push every 4 hours PRN  metoclopramide Injectable 10 milliGRAM(s) IV Push every 6 hours PRN  sodium chloride 0.9% lock flush 10 milliLiter(s) IV Push every 1 hour PRN      A/P:   59 year old male with a history of AML  Pre :  Allogeneic PBSCT day +7   Strict I&O, daily weights, prn diuresis   Cardiology following. Continue current plan.   UA and BC from 10/10 (-)  Cytoxan 10/12,13  10/14- Start Tacro, Cellcept and Zarxio today.    1. Infectious Disease:   acyclovir   Oral Tab/Cap 400 milliGRAM(s) Oral every 8 hours  cefepime   IVPB 2000 milliGRAM(s) IV Intermittent every 8 hours  clotrimazole Lozenge 1 Lozenge Oral five times a day  voriconazole 200 milliGRAM(s) Oral every 12 hours    2. VOD Prophylaxis: Actigall, Glutamine, Heparin (dosed at 100 units / kg / day)     3. GI Prophylaxis:   pantoprazole    Tablet 40 milliGRAM(s) Oral before breakfast    4. Mouthcare - NS / NaHCO3 rinses, Mycelex, Biotene; Skin care     5. GVHD prophylaxis   mycophenolate mofetil 1000 milliGRAM(s) Oral <User Schedule>  tacrolimus 1 milliGRAM(s) Oral two times a day    6. Transfuse & replete electrolytes prn   magnesium oxide  400mg po TID   Anemia-replace PRBC    7. IV hydration, daily weights, strict I&O, prn diuresis     8. PO intake as tolerated, nutrition follow up as needed, MVI, folic acid     9. Antiemetics, anti-diarrhea medications:   metoclopramide Injectable 10 milliGRAM(s) IV Push every 6 hours PRN  ondansetron Injectable 8 milliGRAM(s) IV Push every 8 hours  aprepitant 80 milliGRAM(s) Oral every 24 hours    10. OOB as tolerated, physical therapy consult if needed     11. Monitor coags / fibrinogen 2x week, vitamin K as needed     12. Monitor closely for clinical changes, monitor for fevers     13. Emotional support provided, plan of care discussed and questions addressed     14. Patient education done regarding plan of care, restrictions and discharge planning     15. Continue regular social work input       I have written the above note for Dr. Gaytan who performed service with me in the room.   Lourdes Nugent  NP-C ([phone])

## 2019-10-16 NOTE — PROGRESS NOTE ADULT - SUBJECTIVE AND OBJECTIVE BOX
[facility] -- Ede Zamudio, Riki, Edgar, Jose Rabago Savella  Office # [phone]      Follow Up:  CHF    Subjective/Observations: Patient seen and examined. Events noted. Resting comfortably in bed. No complaints of chest pain, dyspnea, or palpitations reported. No signs of orthopnea or PND.       REVIEW OF SYSTEMS: All other review of systems is negative unless indicated above    PAST MEDICAL & SURGICAL HISTORY:  COLLEEN (obstructive sleep apnea)  Pulmonary embolism  Deep vein thrombosis (DVT)  H/O cardiomyopathy  Factor 5 Leiden mutation, heterozygous      MEDICATIONS  (STANDING):  acetaminophen   Tablet .. 650 milliGRAM(s) Oral every 6 hours  acetaminophen   Tablet .. 650 milliGRAM(s) Oral <User Schedule>  acyclovir   Oral Tab/Cap 400 milliGRAM(s) Oral every 8 hours  ALPRAZolam 0.5 milliGRAM(s) Oral at bedtime  Biotene Dry Mouth Oral Rinse 5 milliLiter(s) Swish and Spit five times a day  cefepime   IVPB 2000 milliGRAM(s) IV Intermittent every 8 hours  chlorhexidine 4% Liquid 1 Application(s) Topical <User Schedule>  clotrimazole Lozenge 1 Lozenge Oral five times a day  diphenhydrAMINE   Injectable 25 milliGRAM(s) IV Push <User Schedule>  docusate sodium 100 milliGRAM(s) Oral three times a day  enalapril 2.5 milliGRAM(s) Oral daily  filgrastim-sndz Injectable 480 MICROGram(s) SubCutaneous daily  finasteride 5 milliGRAM(s) Oral daily  folic acid 1 milliGRAM(s) Oral daily  heparin  Infusion 339 Unit(s)/Hr (3.39 mL/Hr) IV Continuous <Continuous>  immune   globulin 10% (GAMMAGARD) IVPB 30 Gram(s) IV Intermittent <User Schedule>  lidocaine/prilocaine Cream 1 Application(s) Topical daily  magnesium oxide 400 milliGRAM(s) Oral three times a day with meals  metoprolol succinate ER 12.5 milliGRAM(s) Oral two times a day  multivitamin 1 Tablet(s) Oral daily  mycophenolate mofetil 1000 milliGRAM(s) Oral <User Schedule>  pantoprazole    Tablet 40 milliGRAM(s) Oral before breakfast  senna 2 Tablet(s) Oral at bedtime  sertraline 50 milliGRAM(s) Oral at bedtime  sodium bicarbonate Mouth Rinse 10 milliLiter(s) Swish and Spit five times a day  sodium chloride 0.9%. 1000 milliLiter(s) (20 mL/Hr) IV Continuous <Continuous>  tacrolimus 1 milliGRAM(s) Oral two times a day  tamsulosin 0.4 milliGRAM(s) Oral at bedtime  ursodiol Capsule 300 milliGRAM(s) Oral two times a day with meals  voriconazole 200 milliGRAM(s) Oral every 12 hours    MEDICATIONS  (PRN):  acetaminophen   Tablet .. 650 milliGRAM(s) Oral every 6 hours PRN Temp greater or equal to 38C (100.4F), Mild Pain (1 - 3)  acetaminophen  IVPB .. 1000 milliGRAM(s) IV Intermittent once PRN Temp greater or equal to 38.5C (101.3F)  diphenhydrAMINE   Injectable 25 milliGRAM(s) IV Push every 4 hours PRN Allergy symptoms  metoclopramide Injectable 10 milliGRAM(s) IV Push every 6 hours PRN Nausea and/or Vomiting  sodium chloride 0.9% lock flush 10 milliLiter(s) IV Push every 1 hour PRN Pre/post blood products, medications, blood draw, and to maintain line patency      Allergies    No Known Allergies    Intolerances            Vital Signs Last 24 Hrs  T(C): 36.6 (16 Oct 2019 06:13), Max: 36.6 (16 Oct 2019 06:13)  T(F): 97.9 (16 Oct 2019 06:13), Max: 97.9 (16 Oct 2019 06:13)  HR: 78 (16 Oct 2019 06:13) (78 - 114)  BP: 114/72 (16 Oct 2019 06:13) (114/72 - 161/94)  BP(mean): --  RR: 18 (16 Oct 2019 06:13) (18 - 18)  SpO2: 95% (16 Oct 2019 06:13) (95% - 100%)    I&O's Summary    15 Oct 2019 07:01  -  16 Oct 2019 07:00  --------------------------------------------------------  IN: 2083 mL / OUT: 1500 mL / NET: 583 mL          PHYSICAL EXAM:     Constitutional: NAD, awake and alert, well-developed  HEENT: Moist Mucous Membranes, Anicteric  Pulmonary: Non-labored, breath sounds are clear bilaterally, No wheezing, rales or rhonchi  Cardiovascular: Regular, S1 and S2, No murmurs, rubs, gallops or clicks  Gastrointestinal: Bowel Sounds present, soft, nontender.   Lymph: No peripheral edema. No lymphadenopathy.  Skin: No visible rashes or ulcers.  Psych:  Mood & affect appropriate for situation    LABS: All Labs Reviewed:                        6.8    <0.10 )-----------( 12       ( 16 Oct 2019 07:13 )             20.9                         7.1    <0.10 )-----------( 20       ( 15 Oct 2019 06:53 )             21.2                         7.0    <0.10 )-----------( 25       ( 14 Oct 2019 06:55 )             20.7     16 Oct 2019 07:10    140    |  108    |  20     ----------------------------<  98     4.0     |  21     |  0.93   15 Oct 2019 06:53    139    |  104    |  20     ----------------------------<  92     3.5     |  22     |  1.02   14 Oct 2019 17:31    x      |  x      |  x      ----------------------------<  x      3.5     |  x      |  x        Ca    9.0        16 Oct 2019 07:10  Ca    8.8        15 Oct 2019 06:53  Ca    8.4        14 Oct 2019 06:55  Phos  2.7       16 Oct 2019 07:10  Phos  3.4       15 Oct 2019 06:53  Phos  3.6       14 Oct 2019 06:55  Mg     1.8       16 Oct 2019 07:10  Mg     1.6       15 Oct 2019 06:53  Mg     1.6       14 Oct 2019 06:55    TPro  5.8    /  Alb  3.2    /  TBili  0.5    /  DBili  0.1    /  AST  12     /  ALT  13     /  AlkPhos  61     16 Oct 2019 07:10  TPro  5.8    /  Alb  3.3    /  TBili  0.6    /  DBili  x      /  AST  14     /  ALT  11     /  AlkPhos  61     15 Oct 2019 06:53  TPro  5.8    /  Alb  3.1    /  TBili  0.4    /  DBili  0.1    /  AST  9      /  ALT  13     /  AlkPhos  64     14 Oct 2019 06:55

## 2019-10-16 NOTE — PROGRESS NOTE ADULT - ASSESSMENT
59 year old male, with previously diagnosed acute myeloid leukemia. s/p induction chemotherapy with Daunorubicin/Cytarabine and HIDAC consolidation chemotherapy, now admitted for Haplo-identical stem cell transplant from son:    - No evidence of volume overload at present.  He is on fluids and has been getting IV Lasix prn to maintain net negative.  His recent CXR is clear.  - Continue to maintain strict I's and O's, and to monitor for signs of volume overload, which he is at risk for.  - No evidence of acute ischemia  - BP is overall stable.   - Continue ACEI  and BB at current doses as tolerated by BP.    - Continue heparin gtt for hx of dvt/pe and hypercoagulable state, and adjust rate per protocol. Monitor platelet count which is now <20.  - Monitor and replete lytes  - To follow while admitted

## 2019-10-16 NOTE — PROGRESS NOTE ADULT - ATTENDING COMMENTS
59 year old male, with previously diagnosed acute myeloid leukemia. s/p induction chemotherapy with Daunorubicin/Cytarabine and HIDAC consolidation chemotherapy, now admitted for Haplo-identical stem cell transplant from son. Conditioning regimen of Fludarabine, Cytoxan, and TBI. Other history includes non-ischemic cardiomyopathy (recent EF 25% 9/19/19), COLLEEN, DVT/PE, HTN, Factor V Leiden and amputation of right toe due to osteomyelitis.  Day + 6.  Febrile likely due to haplostorm,  s/p CTX 2/2.  Follow temps if persist can dose 1 mg/kg of solumedrol.  Blood/urine cultures from 10/10 negative.  Continue cefepime/voriconazole for now along with acyclovir prophylaxis.  If continues to spike, can consider adding IV vanco.  Strict I&O, daily weights, prn diuresis-   Renal insufficiency- monitor daily creatinine, normal today.   Started  Zarxio on day +5,  MMF and Tacrolimus. Check Tacrolimus levels on Monday and Thursday.   Anxiolytics, antinausea, antidiarrhea medications as needed   Nutritional support.  Hx non-ischemic CM, followed by Dr. Luke Lyn, last Echo 9/19 (EF 25%).  Monitor for fluid overload.  VOD prophylaxis - low dose heparin gtt (dosed at 100 units / kg / day), glutamine supplementation, Actigall BID   GI prophylaxis - Protonix po QD.  Transfusion / electrolyte support  Aggressive mouth care and skin care as per protocol. 59 year old male, with previously diagnosed acute myeloid leukemia. s/p induction chemotherapy with Daunorubicin/Cytarabine and HIDAC consolidation chemotherapy, now admitted for Haplo-identical stem cell transplant from son. Conditioning regimen of Fludarabine, Cytoxan, and TBI. Other history includes non-ischemic cardiomyopathy (recent EF 25% 9/19/19), COLLEEN, DVT/PE, HTN, Factor V Leiden and amputation of right toe due to osteomyelitis.  Day + 7.  Febrile likely due to haplostorm,  s/p CTX 2/2.  Follow temps if persist can dose 1 mg/kg of solumedrol.  Blood/urine cultures from 10/10 negative.  Continue cefepime/voriconazole for now along with acyclovir prophylaxis.  If continues to spike, can consider adding IV vanco.  Strict I&O, daily weights, prn diuresis-   Renal insufficiency- monitor daily creatinine, normal today.   Started  Zarxio on day +5,  MMF and Tacrolimus. Check Tacrolimus levels on Monday and Thursday.   Anxiolytics, antinausea, antidiarrhea medications as needed   Nutritional support.  Hx non-ischemic CM, followed by Dr. Luke Lyn, last Echo 9/19 (EF 25%).  Monitor for fluid overload.  VOD prophylaxis - low dose heparin gtt (dosed at 100 units / kg / day), glutamine supplementation, Actigall BID   GI prophylaxis - Protonix po QD.  Transfusion / electrolyte support  Aggressive mouth care and skin care as per protocol.

## 2019-10-17 LAB
ALBUMIN SERPL ELPH-MCNC: 3.2 G/DL — LOW (ref 3.3–5)
ALP SERPL-CCNC: 62 U/L — SIGNIFICANT CHANGE UP (ref 40–120)
ALT FLD-CCNC: 12 U/L — SIGNIFICANT CHANGE UP (ref 10–45)
ANION GAP SERPL CALC-SCNC: 11 MMOL/L — SIGNIFICANT CHANGE UP (ref 5–17)
APTT BLD: 30.2 SEC — SIGNIFICANT CHANGE UP (ref 27.5–36.3)
AST SERPL-CCNC: 14 U/L — SIGNIFICANT CHANGE UP (ref 10–40)
BILIRUB SERPL-MCNC: 0.5 MG/DL — SIGNIFICANT CHANGE UP (ref 0.2–1.2)
BUN SERPL-MCNC: 16 MG/DL — SIGNIFICANT CHANGE UP (ref 7–23)
CALCIUM SERPL-MCNC: 8.9 MG/DL — SIGNIFICANT CHANGE UP (ref 8.4–10.5)
CHLORIDE SERPL-SCNC: 105 MMOL/L — SIGNIFICANT CHANGE UP (ref 96–108)
CO2 SERPL-SCNC: 22 MMOL/L — SIGNIFICANT CHANGE UP (ref 22–31)
CREAT SERPL-MCNC: 0.82 MG/DL — SIGNIFICANT CHANGE UP (ref 0.5–1.3)
CULTURE RESULTS: SIGNIFICANT CHANGE UP
CULTURE RESULTS: SIGNIFICANT CHANGE UP
FIBRINOGEN PPP-MCNC: 482 MG/DL — SIGNIFICANT CHANGE UP (ref 350–510)
GLUCOSE SERPL-MCNC: 94 MG/DL — SIGNIFICANT CHANGE UP (ref 70–99)
HCT VFR BLD CALC: 22.4 % — LOW (ref 39–50)
HGB BLD-MCNC: 7.7 G/DL — LOW (ref 13–17)
INR BLD: 1.04 RATIO — SIGNIFICANT CHANGE UP (ref 0.88–1.16)
LDH SERPL L TO P-CCNC: 125 U/L — SIGNIFICANT CHANGE UP (ref 50–242)
MAGNESIUM SERPL-MCNC: 1.7 MG/DL — SIGNIFICANT CHANGE UP (ref 1.6–2.6)
MCHC RBC-ENTMCNC: 30.9 PG — SIGNIFICANT CHANGE UP (ref 27–34)
MCHC RBC-ENTMCNC: 34.4 GM/DL — SIGNIFICANT CHANGE UP (ref 32–36)
MCV RBC AUTO: 90 FL — SIGNIFICANT CHANGE UP (ref 80–100)
NRBC # BLD: 0 /100 WBCS — SIGNIFICANT CHANGE UP (ref 0–0)
PHOSPHATE SERPL-MCNC: 2.6 MG/DL — SIGNIFICANT CHANGE UP (ref 2.5–4.5)
PLATELET # BLD AUTO: 7 K/UL — CRITICAL LOW (ref 150–400)
POTASSIUM SERPL-MCNC: 4.1 MMOL/L — SIGNIFICANT CHANGE UP (ref 3.5–5.3)
POTASSIUM SERPL-SCNC: 4.1 MMOL/L — SIGNIFICANT CHANGE UP (ref 3.5–5.3)
PROT SERPL-MCNC: 5.7 G/DL — LOW (ref 6–8.3)
PROTHROM AB SERPL-ACNC: 12 SEC — SIGNIFICANT CHANGE UP (ref 10–12.9)
RBC # BLD: 2.49 M/UL — LOW (ref 4.2–5.8)
RBC # FLD: 18.1 % — HIGH (ref 10.3–14.5)
SODIUM SERPL-SCNC: 138 MMOL/L — SIGNIFICANT CHANGE UP (ref 135–145)
SPECIMEN SOURCE: SIGNIFICANT CHANGE UP
SPECIMEN SOURCE: SIGNIFICANT CHANGE UP
TACROLIMUS SERPL-MCNC: 3.4 NG/ML — SIGNIFICANT CHANGE UP
WBC # BLD: <0.1 K/UL — CRITICAL LOW (ref 3.8–10.5)
WBC # FLD AUTO: <0.1 K/UL — CRITICAL LOW (ref 3.8–10.5)

## 2019-10-17 PROCEDURE — 99232 SBSQ HOSP IP/OBS MODERATE 35: CPT

## 2019-10-17 PROCEDURE — 99233 SBSQ HOSP IP/OBS HIGH 50: CPT

## 2019-10-17 PROCEDURE — 99291 CRITICAL CARE FIRST HOUR: CPT

## 2019-10-17 RX ORDER — TACROLIMUS 5 MG/1
1.5 CAPSULE ORAL
Refills: 0 | Status: DISCONTINUED | OUTPATIENT
Start: 2019-10-17 | End: 2019-11-01

## 2019-10-17 RX ADMIN — Medication 10 MILLILITER(S): at 13:12

## 2019-10-17 RX ADMIN — Medication 5 MILLILITER(S): at 23:51

## 2019-10-17 RX ADMIN — Medication 10 MILLILITER(S): at 23:51

## 2019-10-17 RX ADMIN — VORICONAZOLE 200 MILLIGRAM(S): 10 INJECTION, POWDER, LYOPHILIZED, FOR SOLUTION INTRAVENOUS at 18:07

## 2019-10-17 RX ADMIN — MYCOPHENOLATE MOFETIL 1000 MILLIGRAM(S): 250 CAPSULE ORAL at 13:09

## 2019-10-17 RX ADMIN — SODIUM CHLORIDE 20 MILLILITER(S): 9 INJECTION INTRAMUSCULAR; INTRAVENOUS; SUBCUTANEOUS at 23:51

## 2019-10-17 RX ADMIN — Medication 12.5 MILLIGRAM(S): at 18:06

## 2019-10-17 RX ADMIN — Medication 1 LOZENGE: at 18:01

## 2019-10-17 RX ADMIN — TAMSULOSIN HYDROCHLORIDE 0.4 MILLIGRAM(S): 0.4 CAPSULE ORAL at 21:06

## 2019-10-17 RX ADMIN — MAGNESIUM OXIDE 400 MG ORAL TABLET 400 MILLIGRAM(S): 241.3 TABLET ORAL at 13:06

## 2019-10-17 RX ADMIN — CHLORHEXIDINE GLUCONATE 1 APPLICATION(S): 213 SOLUTION TOPICAL at 10:22

## 2019-10-17 RX ADMIN — Medication 1 LOZENGE: at 13:04

## 2019-10-17 RX ADMIN — Medication 1 LOZENGE: at 19:46

## 2019-10-17 RX ADMIN — VORICONAZOLE 200 MILLIGRAM(S): 10 INJECTION, POWDER, LYOPHILIZED, FOR SOLUTION INTRAVENOUS at 05:06

## 2019-10-17 RX ADMIN — URSODIOL 300 MILLIGRAM(S): 250 TABLET, FILM COATED ORAL at 18:04

## 2019-10-17 RX ADMIN — TACROLIMUS 1.5 MILLIGRAM(S): 5 CAPSULE ORAL at 18:09

## 2019-10-17 RX ADMIN — FINASTERIDE 5 MILLIGRAM(S): 5 TABLET, FILM COATED ORAL at 13:05

## 2019-10-17 RX ADMIN — Medication 10 MILLILITER(S): at 18:13

## 2019-10-17 RX ADMIN — MYCOPHENOLATE MOFETIL 1000 MILLIGRAM(S): 250 CAPSULE ORAL at 11:23

## 2019-10-17 RX ADMIN — Medication 1 LOZENGE: at 08:39

## 2019-10-17 RX ADMIN — MAGNESIUM OXIDE 400 MG ORAL TABLET 400 MILLIGRAM(S): 241.3 TABLET ORAL at 08:38

## 2019-10-17 RX ADMIN — MYCOPHENOLATE MOFETIL 1000 MILLIGRAM(S): 250 CAPSULE ORAL at 21:06

## 2019-10-17 RX ADMIN — CEFEPIME 100 MILLIGRAM(S): 1 INJECTION, POWDER, FOR SOLUTION INTRAMUSCULAR; INTRAVENOUS at 21:06

## 2019-10-17 RX ADMIN — SERTRALINE 50 MILLIGRAM(S): 25 TABLET, FILM COATED ORAL at 21:06

## 2019-10-17 RX ADMIN — TACROLIMUS 1 MILLIGRAM(S): 5 CAPSULE ORAL at 05:06

## 2019-10-17 RX ADMIN — Medication 400 MILLIGRAM(S): at 05:05

## 2019-10-17 RX ADMIN — Medication 12.5 MILLIGRAM(S): at 05:06

## 2019-10-17 RX ADMIN — Medication 5 MILLILITER(S): at 08:44

## 2019-10-17 RX ADMIN — Medication 480 MICROGRAM(S): at 18:02

## 2019-10-17 RX ADMIN — Medication 10 MILLILITER(S): at 08:38

## 2019-10-17 RX ADMIN — Medication 1 MILLIGRAM(S): at 13:06

## 2019-10-17 RX ADMIN — Medication 1 TABLET(S): at 13:06

## 2019-10-17 RX ADMIN — Medication 10 MILLILITER(S): at 19:46

## 2019-10-17 RX ADMIN — Medication 0.5 MILLIGRAM(S): at 21:06

## 2019-10-17 RX ADMIN — URSODIOL 300 MILLIGRAM(S): 250 TABLET, FILM COATED ORAL at 08:38

## 2019-10-17 RX ADMIN — Medication 5 MILLILITER(S): at 18:12

## 2019-10-17 RX ADMIN — Medication 1 LOZENGE: at 23:51

## 2019-10-17 RX ADMIN — HEPARIN SODIUM 3.39 UNIT(S)/HR: 5000 INJECTION INTRAVENOUS; SUBCUTANEOUS at 23:51

## 2019-10-17 RX ADMIN — PANTOPRAZOLE SODIUM 40 MILLIGRAM(S): 20 TABLET, DELAYED RELEASE ORAL at 05:07

## 2019-10-17 RX ADMIN — MAGNESIUM OXIDE 400 MG ORAL TABLET 400 MILLIGRAM(S): 241.3 TABLET ORAL at 18:03

## 2019-10-17 RX ADMIN — Medication 5 MILLILITER(S): at 19:46

## 2019-10-17 RX ADMIN — CEFEPIME 100 MILLIGRAM(S): 1 INJECTION, POWDER, FOR SOLUTION INTRAMUSCULAR; INTRAVENOUS at 05:06

## 2019-10-17 RX ADMIN — CEFEPIME 100 MILLIGRAM(S): 1 INJECTION, POWDER, FOR SOLUTION INTRAMUSCULAR; INTRAVENOUS at 13:07

## 2019-10-17 RX ADMIN — Medication 400 MILLIGRAM(S): at 13:07

## 2019-10-17 RX ADMIN — Medication 2.5 MILLIGRAM(S): at 05:06

## 2019-10-17 RX ADMIN — Medication 400 MILLIGRAM(S): at 21:06

## 2019-10-17 RX ADMIN — Medication 5 MILLILITER(S): at 13:11

## 2019-10-17 NOTE — PROGRESS NOTE ADULT - ATTENDING COMMENTS
59 year old male, with previously diagnosed acute myeloid leukemia. s/p induction chemotherapy with Daunorubicin/Cytarabine and HIDAC consolidation chemotherapy, now admitted for Haplo-identical stem cell transplant from son. Conditioning regimen of Fludarabine, Cytoxan, and TBI. Other history includes non-ischemic cardiomyopathy (recent EF 25% 9/19/19), COLLEEN, DVT/PE, HTN, Factor V Leiden and amputation of right toe due to osteomyelitis.  Day + 7.  Febrile likely due to haplostorm,  s/p CTX 2/2.  Follow temps if persist can dose 1 mg/kg of solumedrol.  Blood/urine cultures from 10/10 negative.  Continue cefepime/voriconazole for now along with acyclovir prophylaxis.  If continues to spike, can consider adding IV vanco.  Strict I&O, daily weights, prn diuresis-   Renal insufficiency- monitor daily creatinine, normal today.   Started  Zarxio on day +5,  MMF and Tacrolimus. Check Tacrolimus levels on Monday and Thursday.   Anxiolytics, antinausea, antidiarrhea medications as needed   Nutritional support.  Hx non-ischemic CM, followed by Dr. Luke Lyn, last Echo 9/19 (EF 25%).  Monitor for fluid overload.  VOD prophylaxis - low dose heparin gtt (dosed at 100 units / kg / day), glutamine supplementation, Actigall BID   GI prophylaxis - Protonix po QD.  Transfusion / electrolyte support  Aggressive mouth care and skin care as per protocol. 59 year old male, with previously diagnosed acute myeloid leukemia. s/p induction chemotherapy with Daunorubicin/Cytarabine and HIDAC consolidation chemotherapy, now admitted for Haplo-identical stem cell transplant from son. Conditioning regimen of Fludarabine, Cytoxan, and TBI. Other history includes non-ischemic cardiomyopathy (recent EF 25% 9/19/19), COLLEEN, DVT/PE, HTN, Factor V Leiden and amputation of right toe due to osteomyelitis.  Day + 8.  Febrile likely due to haplostorm,  s/p CTX 2/2.  Follow temps if persist can dose 1 mg/kg of solumedrol.  Blood/urine cultures from 10/10 negative.  Continue cefepime/voriconazole for now along with acyclovir prophylaxis.  If continues to spike, can consider adding IV vanco.  Strict I&O, daily weights, prn diuresis-   Renal insufficiency- monitor daily creatinine, normal today.   Started  Zarxio on day +5,  MMF and Tacrolimus. Check Tacrolimus levels on Monday and Thursday.   Anxiolytics, antinausea, antidiarrhea medications as needed   Nutritional support.  Hx non-ischemic CM, followed by Dr. Luke Lyn, last Echo 9/19 (EF 25%).  Monitor for fluid overload.  VOD prophylaxis - low dose heparin gtt (dosed at 100 units / kg / day), glutamine supplementation, Actigall BID   GI prophylaxis - Protonix po QD.  Transfusion / electrolyte support  Aggressive mouth care and skin care as per protocol.

## 2019-10-17 NOTE — PROGRESS NOTE ADULT - SUBJECTIVE AND OBJECTIVE BOX
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    S: Patient seen and examined with HPC Transplant Team:    No complaints   Denies mouth / tongue / throat pain, dyspnea, cough, nausea, vomiting, diarrhea, abdominal pain    O: Vitals:   Vital Signs Last 24 Hrs  T(C): 36.2 (17 Oct 2019 05:56), Max: 36.8 (16 Oct 2019 21:31)  T(F): 97.2 (17 Oct 2019 05:56), Max: 98.2 (16 Oct 2019 21:31)  HR: 78 (17 Oct 2019 05:56) (77 - 89)  BP: 129/82 (17 Oct 2019 05:56) (114/74 - 142/86)  BP(mean): --  RR: 18 (17 Oct 2019 05:56) (18 - 18)  SpO2: 95% (17 Oct 2019 05:56) (95% - 100%)    Admit weight:  81.5kg  Daily     Daily Weight in k (16 Oct 2019 10:00)    Intake / Output:   10-16 @ 07:01  -  10-17 @ 07:00  --------------------------------------------------------  IN: 2336 mL / OUT: 2350 mL / NET: -14 mL    PE:   Oropharynx: no ulceration or erythema  Oral Mucositis:       none.  Dry                                             Grade:   CVS: reg S1 S2  Lungs: CTA  Abdomen: soft, + normoactive BS, NT, ND  Extremities: No C/C/E.  Toe amputation  Gastric Mucositis:     none                                             Grade:   Intestinal Mucositis:      none                                        Grade:   Skin: no rash  TLC:  C/D/I  Neuro: Alert and oriented x3  Pain: none        Labs:   CBC Full  -  ( 17 Oct 2019 06:51 )  WBC Count : <0.10 K/uL  Hemoglobin : 7.7 g/dL  Hematocrit : 22.4 %  Platelet Count - Automated : 7 K/uL  Mean Cell Volume : 90.0 fl  Mean Cell Hemoglobin : 30.9 pg  Mean Cell Hemoglobin Concentration : 34.4 gm/dL  Auto Neutrophil # : x  Auto Lymphocyte # : x  Auto Monocyte # : x  Auto Eosinophil # : x  Auto Basophil # : x  Auto Neutrophil % : x  Auto Lymphocyte % : x  Auto Monocyte % : x  Auto Eosinophil % : x  Auto Basophil % : x                          7.7    <0.10 )-----------( 7        ( 17 Oct 2019 06:51 )             22.4     10-17    138  |  105  |  16  ----------------------------<  94  4.1   |  22  |  0.82    Ca    8.9      17 Oct 2019 06:51  Phos  2.6     10-17  Mg     1.7     10-17    TPro  5.7<L>  /  Alb  3.2<L>  /  TBili  0.5  /  DBili  x   /  AST  14  /  ALT  12  /  AlkPhos  62  10-17    PT/INR - ( 17 Oct 2019 06:51 )   PT: 12.0 sec;   INR: 1.04 ratio         PTT - ( 17 Oct 2019 06:51 )  PTT:30.2 sec  LIVER FUNCTIONS - ( 17 Oct 2019 06:51 )  Alb: 3.2 g/dL / Pro: 5.7 g/dL / ALK PHOS: 62 U/L / ALT: 12 U/L / AST: 14 U/L / GGT: x           Lactate Dehydrogenase, Serum: 125 U/L (10-17 @ 06:51)      Meds:   Antimicrobials:   acyclovir   Oral Tab/Cap 400 milliGRAM(s) Oral every 8 hours  cefepime   IVPB 2000 milliGRAM(s) IV Intermittent every 8 hours  clotrimazole Lozenge 1 Lozenge Oral five times a day  voriconazole 200 milliGRAM(s) Oral every 12 hours      Heme / Onc:   heparin  Infusion 339 Unit(s)/Hr IV Continuous <Continuous>      GI:  docusate sodium 100 milliGRAM(s) Oral three times a day  pantoprazole    Tablet 40 milliGRAM(s) Oral before breakfast  senna 2 Tablet(s) Oral at bedtime  sodium bicarbonate Mouth Rinse 10 milliLiter(s) Swish and Spit five times a day  ursodiol Capsule 300 milliGRAM(s) Oral two times a day with meals      Cardiovascular:   enalapril 2.5 milliGRAM(s) Oral daily  metoprolol succinate ER 12.5 milliGRAM(s) Oral two times a day  tamsulosin 0.4 milliGRAM(s) Oral at bedtime      Immunologic:   filgrastim-sndz Injectable 480 MICROGram(s) SubCutaneous daily  immune   globulin 10% (GAMMAGARD) IVPB 30 Gram(s) IV Intermittent <User Schedule>  mycophenolate mofetil 1000 milliGRAM(s) Oral <User Schedule>  tacrolimus 1 milliGRAM(s) Oral two times a day      Other medications:   acetaminophen   Tablet .. 650 milliGRAM(s) Oral every 6 hours  acetaminophen   Tablet .. 650 milliGRAM(s) Oral <User Schedule>  ALPRAZolam 0.5 milliGRAM(s) Oral at bedtime  Biotene Dry Mouth Oral Rinse 5 milliLiter(s) Swish and Spit five times a day  chlorhexidine 4% Liquid 1 Application(s) Topical <User Schedule>  diphenhydrAMINE   Injectable 25 milliGRAM(s) IV Push <User Schedule>  finasteride 5 milliGRAM(s) Oral daily  folic acid 1 milliGRAM(s) Oral daily  lidocaine/prilocaine Cream 1 Application(s) Topical daily  magnesium oxide 400 milliGRAM(s) Oral three times a day with meals  multivitamin 1 Tablet(s) Oral daily  sertraline 50 milliGRAM(s) Oral at bedtime  sodium chloride 0.9%. 1000 milliLiter(s) IV Continuous <Continuous>      PRN:   acetaminophen   Tablet .. 650 milliGRAM(s) Oral every 6 hours PRN  acetaminophen  IVPB .. 1000 milliGRAM(s) IV Intermittent once PRN  diphenhydrAMINE   Injectable 25 milliGRAM(s) IV Push every 4 hours PRN  metoclopramide Injectable 10 milliGRAM(s) IV Push every 6 hours PRN  sodium chloride 0.9% lock flush 10 milliLiter(s) IV Push every 1 hour PRN      A/P:   59 year old male with a history of AML  Pre :  Allogeneic PBSCT day +8   Strict I&O, daily weights, prn diuresis   Cardiology following. Continue current plan.   UA and BC from 10/10 (-)  Cytoxan 10/12,13  10/14- Start Tacro, Cellcept and Zarxio    1. Infectious Disease:   acyclovir   Oral Tab/Cap 400 milliGRAM(s) Oral every 8 hours  cefepime   IVPB 2000 milliGRAM(s) IV Intermittent every 8 hours  clotrimazole Lozenge 1 Lozenge Oral five times a day  voriconazole 200 milliGRAM(s) Oral every 12 hours    2. VOD Prophylaxis: Actigall, Glutamine, Heparin (dosed at 100 units / kg / day)     3. GI Prophylaxis:   pantoprazole    Tablet 40 milliGRAM(s) Oral before breakfast    4. Mouthcare - NS / NaHCO3 rinses, Mycelex, Biotene; Skin care     5. GVHD prophylaxis   mycophenolate mofetil 1000 milliGRAM(s) Oral <User Schedule>  tacrolimus 1 milliGRAM(s) Oral two times a day    6. Transfuse & replete electrolytes prn   magnesium oxide  400mg po TID   Anemia-replace PRBC    7. IV hydration, daily weights, strict I&O, prn diuresis     8. PO intake as tolerated, nutrition follow up as needed, MVI, folic acid     9. Antiemetics, anti-diarrhea medications:   metoclopramide Injectable 10 milliGRAM(s) IV Push every 6 hours PRN  ondansetron Injectable 8 milliGRAM(s) IV Push every 8 hours  aprepitant 80 milliGRAM(s) Oral every 24 hours    10. OOB as tolerated, physical therapy consult if needed     11. Monitor coags / fibrinogen 2x week, vitamin K as needed     12. Monitor closely for clinical changes, monitor for fevers     13. Emotional support provided, plan of care discussed and questions addressed     14. Patient education done regarding plan of care, restrictions and discharge planning     15. Continue regular social work input       I have written the above note for Dr. Gaytan who performed service with me in the room.   Lourdes Nugent  NP-C ([phone]) 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    S: Patient seen and examined with HPC Transplant Team:    No complaints   Denies mouth / tongue / throat pain, dyspnea, cough, nausea, vomiting, diarrhea, abdominal pain    O: Vitals:   Vital Signs Last 24 Hrs  T(C): 36.2 (17 Oct 2019 05:56), Max: 36.8 (16 Oct 2019 21:31)  T(F): 97.2 (17 Oct 2019 05:56), Max: 98.2 (16 Oct 2019 21:31)  HR: 78 (17 Oct 2019 05:56) (77 - 89)  BP: 129/82 (17 Oct 2019 05:56) (114/74 - 142/86)  BP(mean): --  RR: 18 (17 Oct 2019 05:56) (18 - 18)  SpO2: 95% (17 Oct 2019 05:56) (95% - 100%)    Admit weight:  81.5kg  Daily 79.8kg     Daily Weight in k (16 Oct 2019 10:00)    Intake / Output:   10-16 @ 07:01  -  10-17 @ 07:00  --------------------------------------------------------  IN: 2336 mL / OUT: 2350 mL / NET: -14 mL    PE:   Oropharynx: no ulceration or erythema  Oral Mucositis:       none.  Dry                                             Grade:   CVS: reg S1 S2  Lungs: CTA  Abdomen: soft, + normoactive BS, NT, ND  Extremities: No C/C/E.  Toe amputation  Gastric Mucositis:     none                                             Grade:   Intestinal Mucositis:      none                                        Grade:   Skin: no rash  TLC:  C/D/I  Neuro: Alert and oriented x3  Pain: none        Labs:   CBC Full  -  ( 17 Oct 2019 06:51 )  WBC Count : <0.10 K/uL  Hemoglobin : 7.7 g/dL  Hematocrit : 22.4 %  Platelet Count - Automated : 7 K/uL  Mean Cell Volume : 90.0 fl  Mean Cell Hemoglobin : 30.9 pg  Mean Cell Hemoglobin Concentration : 34.4 gm/dL  Auto Neutrophil # : x  Auto Lymphocyte # : x  Auto Monocyte # : x  Auto Eosinophil # : x  Auto Basophil # : x  Auto Neutrophil % : x  Auto Lymphocyte % : x  Auto Monocyte % : x  Auto Eosinophil % : x  Auto Basophil % : x                          7.7    <0.10 )-----------( 7        ( 17 Oct 2019 06:51 )             22.4     10-17    138  |  105  |  16  ----------------------------<  94  4.1   |  22  |  0.82    Ca    8.9      17 Oct 2019 06:51  Phos  2.6     10-17  Mg     1.7     10-17    TPro  5.7<L>  /  Alb  3.2<L>  /  TBili  0.5  /  DBili  x   /  AST  14  /  ALT  12  /  AlkPhos  62  10-17    PT/INR - ( 17 Oct 2019 06:51 )   PT: 12.0 sec;   INR: 1.04 ratio         PTT - ( 17 Oct 2019 06:51 )  PTT:30.2 sec  LIVER FUNCTIONS - ( 17 Oct 2019 06:51 )  Alb: 3.2 g/dL / Pro: 5.7 g/dL / ALK PHOS: 62 U/L / ALT: 12 U/L / AST: 14 U/L / GGT: x           Lactate Dehydrogenase, Serum: 125 U/L (10-17 @ 06:51)      Meds:   Antimicrobials:   acyclovir   Oral Tab/Cap 400 milliGRAM(s) Oral every 8 hours  cefepime   IVPB 2000 milliGRAM(s) IV Intermittent every 8 hours  clotrimazole Lozenge 1 Lozenge Oral five times a day  voriconazole 200 milliGRAM(s) Oral every 12 hours      Heme / Onc:   heparin  Infusion 339 Unit(s)/Hr IV Continuous <Continuous>      GI:  docusate sodium 100 milliGRAM(s) Oral three times a day  pantoprazole    Tablet 40 milliGRAM(s) Oral before breakfast  senna 2 Tablet(s) Oral at bedtime  sodium bicarbonate Mouth Rinse 10 milliLiter(s) Swish and Spit five times a day  ursodiol Capsule 300 milliGRAM(s) Oral two times a day with meals      Cardiovascular:   enalapril 2.5 milliGRAM(s) Oral daily  metoprolol succinate ER 12.5 milliGRAM(s) Oral two times a day  tamsulosin 0.4 milliGRAM(s) Oral at bedtime      Immunologic:   filgrastim-sndz Injectable 480 MICROGram(s) SubCutaneous daily  immune   globulin 10% (GAMMAGARD) IVPB 30 Gram(s) IV Intermittent <User Schedule>  mycophenolate mofetil 1000 milliGRAM(s) Oral <User Schedule>  tacrolimus 1 milliGRAM(s) Oral two times a day      Other medications:   acetaminophen   Tablet .. 650 milliGRAM(s) Oral every 6 hours  acetaminophen   Tablet .. 650 milliGRAM(s) Oral <User Schedule>  ALPRAZolam 0.5 milliGRAM(s) Oral at bedtime  Biotene Dry Mouth Oral Rinse 5 milliLiter(s) Swish and Spit five times a day  chlorhexidine 4% Liquid 1 Application(s) Topical <User Schedule>  diphenhydrAMINE   Injectable 25 milliGRAM(s) IV Push <User Schedule>  finasteride 5 milliGRAM(s) Oral daily  folic acid 1 milliGRAM(s) Oral daily  lidocaine/prilocaine Cream 1 Application(s) Topical daily  magnesium oxide 400 milliGRAM(s) Oral three times a day with meals  multivitamin 1 Tablet(s) Oral daily  sertraline 50 milliGRAM(s) Oral at bedtime  sodium chloride 0.9%. 1000 milliLiter(s) IV Continuous <Continuous>      PRN:   acetaminophen   Tablet .. 650 milliGRAM(s) Oral every 6 hours PRN  acetaminophen  IVPB .. 1000 milliGRAM(s) IV Intermittent once PRN  diphenhydrAMINE   Injectable 25 milliGRAM(s) IV Push every 4 hours PRN  metoclopramide Injectable 10 milliGRAM(s) IV Push every 6 hours PRN  sodium chloride 0.9% lock flush 10 milliLiter(s) IV Push every 1 hour PRN      A/P:   59 year old male with a history of AML  Pre :  Allogeneic PBSCT day +8   Strict I&O, daily weights, prn diuresis   Cardiology following. Continue current plan.   UA and BC from 10/10 (-)  Cytoxan 10/12,13  10/14- Start Tacro, Cellcept and Zarxio  10/17 increase taco 1.5 mg bid    1. Infectious Disease:   acyclovir   Oral Tab/Cap 400 milliGRAM(s) Oral every 8 hours  cefepime   IVPB 2000 milliGRAM(s) IV Intermittent every 8 hours  clotrimazole Lozenge 1 Lozenge Oral five times a day  voriconazole 200 milliGRAM(s) Oral every 12 hours    2. VOD Prophylaxis: Actigall, Glutamine, Heparin (dosed at 100 units / kg / day)     3. GI Prophylaxis:   pantoprazole    Tablet 40 milliGRAM(s) Oral before breakfast    4. Mouthcare - NS / NaHCO3 rinses, Mycelex, Biotene; Skin care     5. GVHD prophylaxis   mycophenolate mofetil 1000 milliGRAM(s) Oral <User Schedule>  tacrolimus     6. Transfuse & replete electrolytes prn   magnesium oxide  400mg po TID   thrombocytopenia-replace plt    7. IV hydration, daily weights, strict I&O, prn diuresis     8. PO intake as tolerated, nutrition follow up as needed, MVI, folic acid     9. Antiemetics, anti-diarrhea medications:   metoclopramide Injectable 10 milliGRAM(s) IV Push every 6 hours PRN  ondansetron Injectable 8 milliGRAM(s) IV Push every 8 hours  aprepitant 80 milliGRAM(s) Oral every 24 hours    10. OOB as tolerated, physical therapy consult if needed     11. Monitor coags / fibrinogen 2x week, vitamin K as needed     12. Monitor closely for clinical changes, monitor for fevers     13. Emotional support provided, plan of care discussed and questions addressed     14. Patient education done regarding plan of care, restrictions and discharge planning     15. Continue regular social work input       I have written the above note for Dr. Gaytan who performed service with me in the room.   Lourdes Nugent  NP-C ([phone])

## 2019-10-17 NOTE — PROGRESS NOTE ADULT - SUBJECTIVE AND OBJECTIVE BOX
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 Palliative called to follow for symptoms post-transplant.    INTERVAL EVENTS:  10/11: d#2 post transplant, states feeling well overall, starting to have diminished appetite  10/15: d#6 post transplant, states having a tough weekend with fever and diarrhea, but feeling better today aside from diminished appetite  10/17: d#8 post transplant, continues to have diminished appetite, but looking forward to homemade fried chicken today     ADVANCE DIRECTIVES:    DNR [ ]  MOLST  [ ]    Living Will  [ ]   DECISION MAKER(s):  [X ] Health Care Proxy(s)  [ ] Surrogate(s)  [ ] Guardian           Name(s) and Contact(s): Ashlyn (significant other)    BASELINE (I)ADL(s) (prior to admission):  [place]: [X ]Total  [ ] Moderate [ ]Dependent    Allergies    No Known Allergies    Intolerances    MEDICATIONS  (STANDING):  acetaminophen   Tablet .. 650 milliGRAM(s) Oral every 6 hours  acetaminophen   Tablet .. 650 milliGRAM(s) Oral <User Schedule>  acyclovir   Oral Tab/Cap 400 milliGRAM(s) Oral every 8 hours  ALPRAZolam 0.5 milliGRAM(s) Oral at bedtime  Biotene Dry Mouth Oral Rinse 5 milliLiter(s) Swish and Spit five times a day  cefepime   IVPB 2000 milliGRAM(s) IV Intermittent every 8 hours  chlorhexidine 4% Liquid 1 Application(s) Topical <User Schedule>  clotrimazole Lozenge 1 Lozenge Oral five times a day  diphenhydrAMINE   Injectable 25 milliGRAM(s) IV Push <User Schedule>  docusate sodium 100 milliGRAM(s) Oral three times a day  enalapril 2.5 milliGRAM(s) Oral daily  filgrastim-sndz Injectable 480 MICROGram(s) SubCutaneous daily  finasteride 5 milliGRAM(s) Oral daily  folic acid 1 milliGRAM(s) Oral daily  heparin  Infusion 339 Unit(s)/Hr (3.39 mL/Hr) IV Continuous <Continuous>  immune   globulin 10% (GAMMAGARD) IVPB 30 Gram(s) IV Intermittent <User Schedule>  lidocaine/prilocaine Cream 1 Application(s) Topical daily  magnesium oxide 400 milliGRAM(s) Oral three times a day with meals  metoprolol succinate ER 12.5 milliGRAM(s) Oral two times a day  multivitamin 1 Tablet(s) Oral daily  mycophenolate mofetil 1000 milliGRAM(s) Oral <User Schedule>  pantoprazole    Tablet 40 milliGRAM(s) Oral before breakfast  senna 2 Tablet(s) Oral at bedtime  sertraline 50 milliGRAM(s) Oral at bedtime  sodium bicarbonate Mouth Rinse 10 milliLiter(s) Swish and Spit five times a day  sodium chloride 0.9%. 1000 milliLiter(s) (20 mL/Hr) IV Continuous <Continuous>  tacrolimus 1.5 milliGRAM(s) Oral two times a day  tamsulosin 0.4 milliGRAM(s) Oral at bedtime  ursodiol Capsule 300 milliGRAM(s) Oral two times a day with meals  voriconazole 200 milliGRAM(s) Oral every 12 hours    MEDICATIONS  (PRN):  acetaminophen   Tablet .. 650 milliGRAM(s) Oral every 6 hours PRN Temp greater or equal to 38C (100.4F), Mild Pain (1 - 3)  acetaminophen  IVPB .. 1000 milliGRAM(s) IV Intermittent once PRN Temp greater or equal to 38.5C (101.3F)  diphenhydrAMINE   Injectable 25 milliGRAM(s) IV Push every 4 hours PRN Allergy symptoms  metoclopramide Injectable 10 milliGRAM(s) IV Push every 6 hours PRN Nausea and/or Vomiting  sodium chloride 0.9% lock flush 10 milliLiter(s) IV Push every 1 hour PRN Pre/post blood products, medications, blood draw, and to maintain line patency    PRESENT SYMPTOMS:   Source if other than patient:  [ ]Family   [ ]Team     Pain (Impact on QOL):    Location -         Minimal acceptable level (0-10 scale):                    Aggravating factors -  Quality -  Radiation -  Severity (0-10 scale) -    Timing -    PAIN AD Score:     [email or web address] (press ctrl +  left click to view)    Dyspnea:                           [ ]Mild [ ]Moderate [ ]Severe  Anxiety:                             [ ]Mild [ ]Moderate [ ]Severe  Fatigue:                             [ ]Mild [ ]Moderate [ ]Severe  Nausea:                             [ ]Mild [ ]Moderate [ ]Severe  Loss of appetite:              [ ]Mild [ ]Moderate [ ]Severe  Constipation:                    [ ]Mild [ ]Moderate [ ]Severe    Other Symptoms: diminished appetite  [X ]All other review of systems negative   [ ]Unable to obtain due to poor mentation     Karnofsky Performance Score/Palliative Performance Status Version 2:  70+%    PHYSICAL EXAM:  Vital Signs Last 24 Hrs  T(C): 37 (17 Oct 2019 13:44), Max: 37 (17 Oct 2019 13:44)  T(F): 98.6 (17 Oct 2019 13:44), Max: 98.6 (17 Oct 2019 13:44)  HR: 87 (17 Oct 2019 13:44) (78 - 89)  BP: 129/78 (17 Oct 2019 13:44) (114/75 - 145/90)  BP(mean): --  RR: 18 (17 Oct 2019 13:44) (18 - 18)  SpO2: 96% (17 Oct 2019 13:44) (95% - 100%)    GENERAL:  [X ]Alert  [ ]Oriented x   [ ]Lethargic  [ ]Cachexia  [ ]Unarousable  [X ]Verbal  [ ]Non-Verbal    Behavioral:   [ ] Anxiety  [ ] Delirium [ ] Agitation [ ] Other    HEENT:  [X ]Normal   [ ]Dry mouth   [ ]ET Tube/Trach  [ ]Oral lesions    PULMONARY:   [X ]Clear [ ]Tachypnea  [ ]Audible excessive secretions   [ ]Rhonchi        [ ]Right [ ]Left [ ]Bilateral  [ ]Crackles        [ ]Right [ ]Left [ ]Bilateral  [ ]Wheezing     [ ]Right [ ]Left [ ]Bilateral    CARDIOVASCULAR:    [ X]Regular [ ]Irregular [ ]Tachy  [ ]Maximino [ ]Murmur [ ]Other    GASTROINTESTINAL:  [ X]Soft  [ ]Distended   [X ]+BS  [ ]Non tender [ ]Tender  [ ]PEG [ ]OGT/ NGT  Last BM:      GENITOURINARY:  [ ]Normal [ ] Incontinent   [ ]Oliguria/Anuria   [ ]Padilla    MUSCULOSKELETAL:   [ ]Normal   [ ]Weakness  [ ]Bed/Wheelchair bound [ ]Edema    NEUROLOGIC:   [ X]No focal deficits  [ ] Cognitive impairment  [ ] Dysphagia [ ]Dysarthria [ ] Paresis [ ]Other     SKIN:   [X ]Normal   [ ]Pressure ulcer(s)  [ ]Rash    CRITICAL CARE:  [ ] Shock Present  [ ]Septic [ ]Cardiogenic [ ]Neurologic [ ]Hypovolemic  [ ]  Vasopressors [ ]  Inotropes   [ ] Respiratory failure present  [ ] Acute  [ ] Chronic [ ] Hypoxic  [ ] Hypercarbic [ ] Other  [ ] Other organ failure     LABS: reviewed                                   7.7    <0.10 )-----------( 7        ( 17 Oct 2019 06:51 )             22.4     10-17    138  |  105  |  16  ----------------------------<  94  4.1   |  22  |  0.82    Ca    8.9      17 Oct 2019 06:51  Phos  2.6     10-17  Mg     1.7     10-17        RADIOLOGY & ADDITIONAL STUDIES: no imaging for review    PROTEIN CALORIE MALNUTRITION:   [ ] PPSV2 < or = to 30% [ ] significant weight loss  [ ] poor nutritional intake [ ] catabolic state [ ] anasarca     Albumin, Serum: 3.4 g/dL (10-04-19 @ 07:34)  Artificial Nutrition [ ]     REFERRALS:   [ ]Chaplaincy  [ ] Hospice  [ ]Child Life  [ ]Social Work  [ ]Case management [ ]Holistic Therapy     Goals of Care Discussion Document:     ........ ....... ...... ..... .... ... .. .  Jairo Benjamin MD  Palliative Medicine  office: [phone] | [phone]  pager: [phone]

## 2019-10-17 NOTE — PROGRESS NOTE ADULT - SUBJECTIVE AND OBJECTIVE BOX
[facility] - Ede Zamudio, Riki, Edgar, Gem, Tabitha Garcia  Office Number:  [phone]    Patient resting comfortably in bed in NAD.  Laying flat with no respiratory distress.  No complaints of chest pain, dyspnea, palpitations, PND, or orthopnea.  feeling better overall    ROS: negative unless otherwise mentioned.    Telemetry:  off    MEDICATIONS  (STANDING):  acetaminophen   Tablet .. 650 milliGRAM(s) Oral every 6 hours  acetaminophen   Tablet .. 650 milliGRAM(s) Oral <User Schedule>  acyclovir   Oral Tab/Cap 400 milliGRAM(s) Oral every 8 hours  ALPRAZolam 0.5 milliGRAM(s) Oral at bedtime  Biotene Dry Mouth Oral Rinse 5 milliLiter(s) Swish and Spit five times a day  cefepime   IVPB 2000 milliGRAM(s) IV Intermittent every 8 hours  chlorhexidine 4% Liquid 1 Application(s) Topical <User Schedule>  clotrimazole Lozenge 1 Lozenge Oral five times a day  diphenhydrAMINE   Injectable 25 milliGRAM(s) IV Push <User Schedule>  docusate sodium 100 milliGRAM(s) Oral three times a day  enalapril 2.5 milliGRAM(s) Oral daily  filgrastim-sndz Injectable 480 MICROGram(s) SubCutaneous daily  finasteride 5 milliGRAM(s) Oral daily  folic acid 1 milliGRAM(s) Oral daily  heparin  Infusion 339 Unit(s)/Hr (3.39 mL/Hr) IV Continuous <Continuous>  immune   globulin 10% (GAMMAGARD) IVPB 30 Gram(s) IV Intermittent <User Schedule>  lidocaine/prilocaine Cream 1 Application(s) Topical daily  magnesium oxide 400 milliGRAM(s) Oral three times a day with meals  metoprolol succinate ER 12.5 milliGRAM(s) Oral two times a day  multivitamin 1 Tablet(s) Oral daily  mycophenolate mofetil 1000 milliGRAM(s) Oral <User Schedule>  pantoprazole    Tablet 40 milliGRAM(s) Oral before breakfast  senna 2 Tablet(s) Oral at bedtime  sertraline 50 milliGRAM(s) Oral at bedtime  sodium bicarbonate Mouth Rinse 10 milliLiter(s) Swish and Spit five times a day  sodium chloride 0.9%. 1000 milliLiter(s) (20 mL/Hr) IV Continuous <Continuous>  tacrolimus 1 milliGRAM(s) Oral two times a day  tamsulosin 0.4 milliGRAM(s) Oral at bedtime  ursodiol Capsule 300 milliGRAM(s) Oral two times a day with meals  voriconazole 200 milliGRAM(s) Oral every 12 hours    MEDICATIONS  (PRN):  acetaminophen   Tablet .. 650 milliGRAM(s) Oral every 6 hours PRN Temp greater or equal to 38C (100.4F), Mild Pain (1 - 3)  acetaminophen  IVPB .. 1000 milliGRAM(s) IV Intermittent once PRN Temp greater or equal to 38.5C (101.3F)  diphenhydrAMINE   Injectable 25 milliGRAM(s) IV Push every 4 hours PRN Allergy symptoms  metoclopramide Injectable 10 milliGRAM(s) IV Push every 6 hours PRN Nausea and/or Vomiting  sodium chloride 0.9% lock flush 10 milliLiter(s) IV Push every 1 hour PRN Pre/post blood products, medications, blood draw, and to maintain line patency      Allergies    No Known Allergies    Intolerances        Vital Signs Last 24 Hrs  T(C): 36.2 (17 Oct 2019 05:56), Max: 36.8 (16 Oct 2019 21:31)  T(F): 97.2 (17 Oct 2019 05:56), Max: 98.2 (16 Oct 2019 21:31)  HR: 78 (17 Oct 2019 05:56) (77 - 89)  BP: 129/82 (17 Oct 2019 05:56) (114/74 - 142/86)  BP(mean): --  RR: 18 (17 Oct 2019 05:56) (18 - 18)  SpO2: 95% (17 Oct 2019 05:56) (95% - 100%)    I&O's Summary    16 Oct 2019 07:01  -  17 Oct 2019 07:00  --------------------------------------------------------  IN: 2336 mL / OUT: 2350 mL / NET: -14 mL        ON EXAM:    Constitutional: NAD, awake and alert, well-developed  HEENT: Moist Mucous Membranes, Anicteric  Pulmonary: Non-labored, breath sounds are clear bilaterally, No wheezing, rales or rhonchi  Cardiovascular: Regular, S1 and S2, No murmurs, rubs, gallops or clicks  Gastrointestinal: Bowel Sounds present, soft, nontender.   Lymph: No peripheral edema. No lymphadenopathy.  Skin: No visible rashes or ulcers.  Psych:  Mood & affect appropriate for situation    LABS: All Labs Reviewed:                        7.7    <0.10 )-----------( 7        ( 17 Oct 2019 06:51 )             22.4                         6.8    <0.10 )-----------( 12       ( 16 Oct 2019 07:13 )             20.9                         7.1    <0.10 )-----------( 20       ( 15 Oct 2019 06:53 )             21.2     17 Oct 2019 06:51    138    |  105    |  16     ----------------------------<  94     4.1     |  22     |  0.82   16 Oct 2019 07:10    140    |  108    |  20     ----------------------------<  98     4.0     |  21     |  0.93   15 Oct 2019 06:53    139    |  104    |  20     ----------------------------<  92     3.5     |  22     |  1.02     Ca    8.9        17 Oct 2019 06:51  Ca    9.0        16 Oct 2019 07:10  Ca    8.8        15 Oct 2019 06:53  Phos  2.6       17 Oct 2019 06:51  Phos  2.7       16 Oct 2019 07:10  Phos  3.4       15 Oct 2019 06:53  Mg     1.7       17 Oct 2019 06:51  Mg     1.8       16 Oct 2019 07:10  Mg     1.6       15 Oct 2019 06:53    TPro  5.7    /  Alb  3.2    /  TBili  0.5    /  DBili  x      /  AST  14     /  ALT  12     /  AlkPhos  62     17 Oct 2019 06:51  TPro  5.8    /  Alb  3.2    /  TBili  0.5    /  DBili  0.1    /  AST  12     /  ALT  13     /  AlkPhos  61     16 Oct 2019 07:10  TPro  5.8    /  Alb  3.3    /  TBili  0.6    /  DBili  x      /  AST  14     /  ALT  11     /  AlkPhos  61     15 Oct 2019 06:53    PT/INR - ( 17 Oct 2019 06:51 )   PT: 12.0 sec;   INR: 1.04 ratio         PTT - ( 17 Oct 2019 06:51 )  PTT:30.2 sec      Blood Culture: Organism --  Gram Stain Blood -- Gram Stain --  Specimen Source .Urine  Culture-Blood --    Organism --  Gram Stain Blood -- Gram Stain --  Specimen Source .Blood  Culture-Blood --

## 2019-10-17 NOTE — PROGRESS NOTE ADULT - PROBLEM SELECTOR PLAN 3
- day #6 after BMT  - monitor for fatigue, mucositis, diarrhea, worsening appetite loss
- day #2 after BMT  - monitor for fatigue, mucositis, diarrhea, worsening appetite loss
- day #8 after BMT  - monitor for fatigue, mucositis, diarrhea, worsening appetite loss

## 2019-10-17 NOTE — PROGRESS NOTE ADULT - PROBLEM SELECTOR PLAN 1
- states having impaired appetite, which is expected after BMT  - monitor, patient is taking PO
- states beginning to have impaired appetite, which is expected after BMT  - monitor
- states having impaired appetite, which is expected after BMT  - monitor, patient is taking PO

## 2019-10-17 NOTE — PROGRESS NOTE ADULT - PROBLEM SELECTOR PROBLEM 2
Acute myeloid leukemia in remission

## 2019-10-17 NOTE — PROGRESS NOTE ADULT - ASSESSMENT
59 year old male, with previously diagnosed acute myeloid leukemia. s/p induction chemotherapy with Daunorubicin/Cytarabine and HIDAC consolidation chemotherapy, now admitted for Haplo-identical stem cell transplant from son:    - No evidence of volume overload at present.  He is on fluids and has been getting IV Lasix prn to maintain net negative.  His recent CXR is clear.  - Continue to maintain strict I's and O's, and to monitor for signs of volume overload, which he is at risk for.  - No evidence of acute ischemia  - BP is overall stable.   - Continue ACEI  and BB at current doses as tolerated by BP. He has been able to get them over the last 48 hours.  - On heparin gtt for hx of dvt/pe and hypercoagulable state, and adjust rate per protocol. Monitor platelet count which is now <10.  - Monitor and replete lytes  - To follow while admitted

## 2019-10-18 LAB
ALBUMIN SERPL ELPH-MCNC: 3.2 G/DL — LOW (ref 3.3–5)
ALP SERPL-CCNC: 60 U/L — SIGNIFICANT CHANGE UP (ref 40–120)
ALT FLD-CCNC: 13 U/L — SIGNIFICANT CHANGE UP (ref 10–45)
ANION GAP SERPL CALC-SCNC: 11 MMOL/L — SIGNIFICANT CHANGE UP (ref 5–17)
AST SERPL-CCNC: 12 U/L — SIGNIFICANT CHANGE UP (ref 10–40)
BILIRUB SERPL-MCNC: 0.4 MG/DL — SIGNIFICANT CHANGE UP (ref 0.2–1.2)
BLD GP AB SCN SERPL QL: NEGATIVE — SIGNIFICANT CHANGE UP
BUN SERPL-MCNC: 18 MG/DL — SIGNIFICANT CHANGE UP (ref 7–23)
CALCIUM SERPL-MCNC: 8.9 MG/DL — SIGNIFICANT CHANGE UP (ref 8.4–10.5)
CHLORIDE SERPL-SCNC: 107 MMOL/L — SIGNIFICANT CHANGE UP (ref 96–108)
CO2 SERPL-SCNC: 24 MMOL/L — SIGNIFICANT CHANGE UP (ref 22–31)
CREAT SERPL-MCNC: 0.95 MG/DL — SIGNIFICANT CHANGE UP (ref 0.5–1.3)
GLUCOSE SERPL-MCNC: 99 MG/DL — SIGNIFICANT CHANGE UP (ref 70–99)
HCT VFR BLD CALC: 21.4 % — LOW (ref 39–50)
HGB BLD-MCNC: 7.3 G/DL — LOW (ref 13–17)
LDH SERPL L TO P-CCNC: 117 U/L — SIGNIFICANT CHANGE UP (ref 50–242)
MAGNESIUM SERPL-MCNC: 1.6 MG/DL — SIGNIFICANT CHANGE UP (ref 1.6–2.6)
MCHC RBC-ENTMCNC: 30.8 PG — SIGNIFICANT CHANGE UP (ref 27–34)
MCHC RBC-ENTMCNC: 34.1 GM/DL — SIGNIFICANT CHANGE UP (ref 32–36)
MCV RBC AUTO: 90.3 FL — SIGNIFICANT CHANGE UP (ref 80–100)
NRBC # BLD: 0 /100 WBCS — SIGNIFICANT CHANGE UP (ref 0–0)
PHOSPHATE SERPL-MCNC: 3 MG/DL — SIGNIFICANT CHANGE UP (ref 2.5–4.5)
PLATELET # BLD AUTO: 24 K/UL — LOW (ref 150–400)
POTASSIUM SERPL-MCNC: 4.1 MMOL/L — SIGNIFICANT CHANGE UP (ref 3.5–5.3)
POTASSIUM SERPL-SCNC: 4.1 MMOL/L — SIGNIFICANT CHANGE UP (ref 3.5–5.3)
PROT SERPL-MCNC: 5.7 G/DL — LOW (ref 6–8.3)
RBC # BLD: 2.37 M/UL — LOW (ref 4.2–5.8)
RBC # FLD: 18.2 % — HIGH (ref 10.3–14.5)
RH IG SCN BLD-IMP: POSITIVE — SIGNIFICANT CHANGE UP
SODIUM SERPL-SCNC: 142 MMOL/L — SIGNIFICANT CHANGE UP (ref 135–145)
WBC # BLD: <0.1 K/UL — CRITICAL LOW (ref 3.8–10.5)
WBC # FLD AUTO: <0.1 K/UL — CRITICAL LOW (ref 3.8–10.5)

## 2019-10-18 PROCEDURE — 99291 CRITICAL CARE FIRST HOUR: CPT

## 2019-10-18 PROCEDURE — 99232 SBSQ HOSP IP/OBS MODERATE 35: CPT

## 2019-10-18 RX ADMIN — Medication 5 MILLILITER(S): at 20:12

## 2019-10-18 RX ADMIN — TAMSULOSIN HYDROCHLORIDE 0.4 MILLIGRAM(S): 0.4 CAPSULE ORAL at 21:08

## 2019-10-18 RX ADMIN — MYCOPHENOLATE MOFETIL 1000 MILLIGRAM(S): 250 CAPSULE ORAL at 13:35

## 2019-10-18 RX ADMIN — Medication 1 LOZENGE: at 08:34

## 2019-10-18 RX ADMIN — Medication 1 LOZENGE: at 16:07

## 2019-10-18 RX ADMIN — URSODIOL 300 MILLIGRAM(S): 250 TABLET, FILM COATED ORAL at 17:38

## 2019-10-18 RX ADMIN — Medication 400 MILLIGRAM(S): at 05:46

## 2019-10-18 RX ADMIN — Medication 10 MILLILITER(S): at 20:12

## 2019-10-18 RX ADMIN — Medication 1 LOZENGE: at 11:50

## 2019-10-18 RX ADMIN — PANTOPRAZOLE SODIUM 40 MILLIGRAM(S): 20 TABLET, DELAYED RELEASE ORAL at 06:47

## 2019-10-18 RX ADMIN — Medication 10 MILLILITER(S): at 08:34

## 2019-10-18 RX ADMIN — TACROLIMUS 1.5 MILLIGRAM(S): 5 CAPSULE ORAL at 17:36

## 2019-10-18 RX ADMIN — Medication 12.5 MILLIGRAM(S): at 05:46

## 2019-10-18 RX ADMIN — SERTRALINE 50 MILLIGRAM(S): 25 TABLET, FILM COATED ORAL at 21:08

## 2019-10-18 RX ADMIN — Medication 5 MILLILITER(S): at 08:34

## 2019-10-18 RX ADMIN — MAGNESIUM OXIDE 400 MG ORAL TABLET 400 MILLIGRAM(S): 241.3 TABLET ORAL at 17:36

## 2019-10-18 RX ADMIN — CEFEPIME 100 MILLIGRAM(S): 1 INJECTION, POWDER, FOR SOLUTION INTRAMUSCULAR; INTRAVENOUS at 21:02

## 2019-10-18 RX ADMIN — Medication 1 MILLIGRAM(S): at 11:49

## 2019-10-18 RX ADMIN — VORICONAZOLE 200 MILLIGRAM(S): 10 INJECTION, POWDER, LYOPHILIZED, FOR SOLUTION INTRAVENOUS at 05:46

## 2019-10-18 RX ADMIN — Medication 480 MICROGRAM(S): at 17:38

## 2019-10-18 RX ADMIN — HEPARIN SODIUM 3.39 UNIT(S)/HR: 5000 INJECTION INTRAVENOUS; SUBCUTANEOUS at 21:02

## 2019-10-18 RX ADMIN — FINASTERIDE 5 MILLIGRAM(S): 5 TABLET, FILM COATED ORAL at 11:49

## 2019-10-18 RX ADMIN — Medication 1 LOZENGE: at 23:29

## 2019-10-18 RX ADMIN — MYCOPHENOLATE MOFETIL 1000 MILLIGRAM(S): 250 CAPSULE ORAL at 08:34

## 2019-10-18 RX ADMIN — URSODIOL 300 MILLIGRAM(S): 250 TABLET, FILM COATED ORAL at 08:34

## 2019-10-18 RX ADMIN — TACROLIMUS 1.5 MILLIGRAM(S): 5 CAPSULE ORAL at 05:48

## 2019-10-18 RX ADMIN — VORICONAZOLE 200 MILLIGRAM(S): 10 INJECTION, POWDER, LYOPHILIZED, FOR SOLUTION INTRAVENOUS at 17:36

## 2019-10-18 RX ADMIN — Medication 0.5 MILLIGRAM(S): at 21:06

## 2019-10-18 RX ADMIN — Medication 12.5 MILLIGRAM(S): at 17:36

## 2019-10-18 RX ADMIN — Medication 5 MILLILITER(S): at 23:29

## 2019-10-18 RX ADMIN — Medication 1 TABLET(S): at 11:49

## 2019-10-18 RX ADMIN — Medication 5 MILLILITER(S): at 11:50

## 2019-10-18 RX ADMIN — Medication 5 MILLILITER(S): at 16:07

## 2019-10-18 RX ADMIN — Medication 10 MILLILITER(S): at 23:29

## 2019-10-18 RX ADMIN — SODIUM CHLORIDE 20 MILLILITER(S): 9 INJECTION INTRAMUSCULAR; INTRAVENOUS; SUBCUTANEOUS at 21:02

## 2019-10-18 RX ADMIN — MYCOPHENOLATE MOFETIL 1000 MILLIGRAM(S): 250 CAPSULE ORAL at 21:03

## 2019-10-18 RX ADMIN — MAGNESIUM OXIDE 400 MG ORAL TABLET 400 MILLIGRAM(S): 241.3 TABLET ORAL at 11:49

## 2019-10-18 RX ADMIN — Medication 2.5 MILLIGRAM(S): at 05:46

## 2019-10-18 RX ADMIN — CEFEPIME 100 MILLIGRAM(S): 1 INJECTION, POWDER, FOR SOLUTION INTRAMUSCULAR; INTRAVENOUS at 13:34

## 2019-10-18 RX ADMIN — Medication 10 MILLILITER(S): at 11:50

## 2019-10-18 RX ADMIN — Medication 10 MILLIGRAM(S): at 15:52

## 2019-10-18 RX ADMIN — CEFEPIME 100 MILLIGRAM(S): 1 INJECTION, POWDER, FOR SOLUTION INTRAMUSCULAR; INTRAVENOUS at 05:50

## 2019-10-18 RX ADMIN — Medication 10 MILLILITER(S): at 16:07

## 2019-10-18 RX ADMIN — Medication 400 MILLIGRAM(S): at 21:06

## 2019-10-18 RX ADMIN — Medication 400 MILLIGRAM(S): at 13:34

## 2019-10-18 RX ADMIN — MAGNESIUM OXIDE 400 MG ORAL TABLET 400 MILLIGRAM(S): 241.3 TABLET ORAL at 08:33

## 2019-10-18 RX ADMIN — Medication 1 LOZENGE: at 20:12

## 2019-10-18 NOTE — PROGRESS NOTE ADULT - SUBJECTIVE AND OBJECTIVE BOX
[facility] - Ede Zamudio, Riki, Edgar, Gem, Tabitha Garcia  Office Number:  [phone]    Patient resting comfortably in bed in NAD.  Laying flat with no respiratory distress.  No complaints of chest pain, dyspnea, palpitations, PND, or orthopnea.  tired, and says he did not sleep well overnight    ROS: negative unless otherwise mentioned.    Telemetry:  off    MEDICATIONS  (STANDING):  acetaminophen   Tablet .. 650 milliGRAM(s) Oral every 6 hours  acetaminophen   Tablet .. 650 milliGRAM(s) Oral <User Schedule>  acyclovir   Oral Tab/Cap 400 milliGRAM(s) Oral every 8 hours  ALPRAZolam 0.5 milliGRAM(s) Oral at bedtime  Biotene Dry Mouth Oral Rinse 5 milliLiter(s) Swish and Spit five times a day  cefepime   IVPB 2000 milliGRAM(s) IV Intermittent every 8 hours  chlorhexidine 4% Liquid 1 Application(s) Topical <User Schedule>  clotrimazole Lozenge 1 Lozenge Oral five times a day  diphenhydrAMINE   Injectable 25 milliGRAM(s) IV Push <User Schedule>  docusate sodium 100 milliGRAM(s) Oral three times a day  enalapril 2.5 milliGRAM(s) Oral daily  filgrastim-sndz Injectable 480 MICROGram(s) SubCutaneous daily  finasteride 5 milliGRAM(s) Oral daily  folic acid 1 milliGRAM(s) Oral daily  heparin  Infusion 339 Unit(s)/Hr (3.39 mL/Hr) IV Continuous <Continuous>  immune   globulin 10% (GAMMAGARD) IVPB 30 Gram(s) IV Intermittent <User Schedule>  lidocaine/prilocaine Cream 1 Application(s) Topical daily  magnesium oxide 400 milliGRAM(s) Oral three times a day with meals  metoprolol succinate ER 12.5 milliGRAM(s) Oral two times a day  multivitamin 1 Tablet(s) Oral daily  mycophenolate mofetil 1000 milliGRAM(s) Oral <User Schedule>  pantoprazole    Tablet 40 milliGRAM(s) Oral before breakfast  senna 2 Tablet(s) Oral at bedtime  sertraline 50 milliGRAM(s) Oral at bedtime  sodium bicarbonate Mouth Rinse 10 milliLiter(s) Swish and Spit five times a day  sodium chloride 0.9%. 1000 milliLiter(s) (20 mL/Hr) IV Continuous <Continuous>  tacrolimus 1.5 milliGRAM(s) Oral two times a day  tamsulosin 0.4 milliGRAM(s) Oral at bedtime  ursodiol Capsule 300 milliGRAM(s) Oral two times a day with meals  voriconazole 200 milliGRAM(s) Oral every 12 hours    MEDICATIONS  (PRN):  acetaminophen   Tablet .. 650 milliGRAM(s) Oral every 6 hours PRN Temp greater or equal to 38C (100.4F), Mild Pain (1 - 3)  acetaminophen  IVPB .. 1000 milliGRAM(s) IV Intermittent once PRN Temp greater or equal to 38.5C (101.3F)  diphenhydrAMINE   Injectable 25 milliGRAM(s) IV Push every 4 hours PRN Allergy symptoms  metoclopramide Injectable 10 milliGRAM(s) IV Push every 6 hours PRN Nausea and/or Vomiting  sodium chloride 0.9% lock flush 10 milliLiter(s) IV Push every 1 hour PRN Pre/post blood products, medications, blood draw, and to maintain line patency      Allergies    No Known Allergies    Intolerances        Vital Signs Last 24 Hrs  T(C): 36.6 (18 Oct 2019 05:17), Max: 37.1 (18 Oct 2019 00:42)  T(F): 97.9 (18 Oct 2019 05:17), Max: 98.8 (18 Oct 2019 00:42)  HR: 91 (18 Oct 2019 05:17) (84 - 104)  BP: 131/80 (18 Oct 2019 05:17) (118/72 - 160/94)  BP(mean): --  RR: 18 (18 Oct 2019 05:17) (18 - 18)  SpO2: 96% (18 Oct 2019 05:17) (96% - 98%)    I&O's Summary    17 Oct 2019 07:01  -  18 Oct 2019 07:00  --------------------------------------------------------  IN: 1610 mL / OUT: 1400 mL / NET: 210 mL        ON EXAM:    Constitutional: NAD, awake and alert, well-developed  HEENT: Moist Mucous Membranes, Anicteric  Pulmonary: Non-labored, breath sounds are clear bilaterally, No wheezing, rales or rhonchi  Cardiovascular: Regular, S1 and S2, No murmurs, rubs, gallops or clicks  Gastrointestinal: Bowel Sounds present, soft, nontender.   Lymph: No peripheral edema. No lymphadenopathy.  Skin: No visible rashes or ulcers.  Psych:  Mood & affect appropriate for situation      LABS: All Labs Reviewed:                        7.3    <0.10 )-----------( 24       ( 18 Oct 2019 07:37 )             21.4                         7.7    <0.10 )-----------( 7        ( 17 Oct 2019 06:51 )             22.4                         6.8    <0.10 )-----------( 12       ( 16 Oct 2019 07:13 )             20.9     18 Oct 2019 07:05    142    |  107    |  18     ----------------------------<  99     4.1     |  24     |  0.95   17 Oct 2019 06:51    138    |  105    |  16     ----------------------------<  94     4.1     |  22     |  0.82   16 Oct 2019 07:10    140    |  108    |  20     ----------------------------<  98     4.0     |  21     |  0.93     Ca    8.9        18 Oct 2019 07:05  Ca    8.9        17 Oct 2019 06:51  Ca    9.0        16 Oct 2019 07:10  Phos  3.0       18 Oct 2019 07:05  Phos  2.6       17 Oct 2019 06:51  Phos  2.7       16 Oct 2019 07:10  Mg     1.6       18 Oct 2019 07:05  Mg     1.7       17 Oct 2019 06:51  Mg     1.8       16 Oct 2019 07:10    TPro  5.7    /  Alb  3.2    /  TBili  0.4    /  DBili  x      /  AST  12     /  ALT  13     /  AlkPhos  60     18 Oct 2019 07:05  TPro  5.7    /  Alb  3.2    /  TBili  0.5    /  DBili  x      /  AST  14     /  ALT  12     /  AlkPhos  62     17 Oct 2019 06:51  TPro  5.8    /  Alb  3.2    /  TBili  0.5    /  DBili  0.1    /  AST  12     /  ALT  13     /  AlkPhos  61     16 Oct 2019 07:10    PT/INR - ( 17 Oct 2019 06:51 )   PT: 12.0 sec;   INR: 1.04 ratio         PTT - ( 17 Oct 2019 06:51 )  PTT:30.2 sec      Blood Culture:

## 2019-10-18 NOTE — PROGRESS NOTE ADULT - ATTENDING COMMENTS
59 year old male, with previously diagnosed acute myeloid leukemia. s/p induction chemotherapy with Daunorubicin/Cytarabine and HIDAC consolidation chemotherapy, now admitted for Haplo-identical stem cell transplant from son. Conditioning regimen of Fludarabine, Cytoxan, and TBI. Other history includes non-ischemic cardiomyopathy (recent EF 25% 9/19/19), COLLEEN, DVT/PE, HTN, Factor V Leiden and amputation of right toe due to osteomyelitis.  Day + 8.  Febrile likely due to haplostorm,  s/p CTX 2/2.  Follow temps if persist can dose 1 mg/kg of solumedrol.  Blood/urine cultures from 10/10 negative.  Continue cefepime/voriconazole for now along with acyclovir prophylaxis.  If continues to spike, can consider adding IV vanco.  Strict I&O, daily weights, prn diuresis-   Renal insufficiency- monitor daily creatinine, normal today.   Started  Zarxio on day +5,  MMF and Tacrolimus. Check Tacrolimus levels on Monday and Thursday.   Anxiolytics, antinausea, antidiarrhea medications as needed   Nutritional support.  Hx non-ischemic CM, followed by Dr. Luke Lyn, last Echo 9/19 (EF 25%).  Monitor for fluid overload.  VOD prophylaxis - low dose heparin gtt (dosed at 100 units / kg / day), glutamine supplementation, Actigall BID   GI prophylaxis - Protonix po QD.  Transfusion / electrolyte support  Aggressive mouth care and skin care as per protocol. 59 year old male, with previously diagnosed acute myeloid leukemia. s/p induction chemotherapy with Daunorubicin/Cytarabine and HIDAC consolidation chemotherapy, now admitted for Haplo-identical stem cell transplant from son. Conditioning regimen of Fludarabine, Cytoxan, and TBI. Other history includes non-ischemic cardiomyopathy (recent EF 25% 9/19/19), COLLEEN, DVT/PE, HTN, Factor V Leiden and amputation of right toe due to osteomyelitis.  Day + 9.  Febrile likely due to haplostorm,  s/p CTX 2/2.  Follow temps if persist can dose 1 mg/kg of solumedrol.  Blood/urine cultures from 10/10 negative.  Continue cefepime/voriconazole for now along with acyclovir prophylaxis.  If continues to spike, can consider adding IV vanco.  Strict I&O, daily weights, prn diuresis-   Renal insufficiency- monitor daily creatinine, normal today.   Started  Zarxio on day +5,  MMF and Tacrolimus. Check Tacrolimus levels on Monday and Thursday.   Anxiolytics, antinausea, antidiarrhea medications as needed   Nutritional support.  Hx non-ischemic CM, followed by Dr. Luke Lyn, last Echo 9/19 (EF 25%).  Monitor for fluid overload.  VOD prophylaxis - low dose heparin gtt (dosed at 100 units / kg / day), glutamine supplementation, Actigall BID   GI prophylaxis - Protonix po QD.  Transfusion / electrolyte support  Aggressive mouth care and skin care as per protocol.

## 2019-10-18 NOTE — PROGRESS NOTE ADULT - SUBJECTIVE AND OBJECTIVE BOX
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    S: Patient seen and examined with HPC Transplant Team:     Denies mouth / tongue / throat pain, dyspnea, cough, nausea, vomiting, diarrhea, abdominal pain     O: Vitals:   Vital Signs Last 24 Hrs  T(C): 36.6 (18 Oct 2019 05:17), Max: 37.1 (18 Oct 2019 00:42)  T(F): 97.9 (18 Oct 2019 05:17), Max: 98.8 (18 Oct 2019 00:42)  HR: 91 (18 Oct 2019 05:17) (84 - 104)  BP: 131/80 (18 Oct 2019 05:17) (118/72 - 160/94)  BP(mean): --  RR: 18 (18 Oct 2019 05:17) (18 - 18)  SpO2: 96% (18 Oct 2019 05:17) (96% - 98%)    Admit weight: 81.5 kg   Daily Weight in k.8 (17 Oct 2019 10:10)  Today's weight: pending     Intake / Output:   10-17 @ 07:01  -  10-18 @ 07:00  --------------------------------------------------------  IN: 1610 mL / OUT: 1400 mL / NET: 210 mL    PE:   Oropharynx: no ulceration or erythema  Oral Mucositis:       none.  Dry                                             Grade:   CVS: reg S1 S2  Lungs: CTA  Abdomen: soft, + normoactive BS, NT, ND  Extremities: No C/C/E.  Toe amputation  Gastric Mucositis:     none                                             Grade:   Intestinal Mucositis:      none                                        Grade:   Skin: no rash  TLC:  C/D/I  Neuro: Alert and oriented x3  Pain: none     Labs:   CBC Full  -  ( 18 Oct 2019 07:37 )  WBC Count : <0.10 K/uL  Hemoglobin : 7.3 g/dL  Hematocrit : 21.4 %  Platelet Count - Automated : 24 K/uL  Mean Cell Volume : 90.3 fl  Mean Cell Hemoglobin : 30.8 pg  Mean Cell Hemoglobin Concentration : 34.1 gm/dL  Auto Neutrophil # : x  Auto Lymphocyte # : x  Auto Monocyte # : x  Auto Eosinophil # : x  Auto Basophil # : x  Auto Neutrophil % : x  Auto Lymphocyte % : x  Auto Monocyte % : x  Auto Eosinophil % : x  Auto Basophil % : x                          7.3    <0.10 )-----------( 24       ( 18 Oct 2019 07:37 )             21.4     10-18    142  |  107  |  18  ----------------------------<  99  4.1   |  24  |  0.95    Ca    8.9      18 Oct 2019 07:05  Phos  3.0     10-18  Mg     1.6     10-18    TPro  5.7<L>  /  Alb  3.2<L>  /  TBili  0.4  /  DBili  x   /  AST  12  /  ALT  13  /  AlkPhos  60  10-18    PT/INR - ( 17 Oct 2019 06:51 )   PT: 12.0 sec;   INR: 1.04 ratio         PTT - ( 17 Oct 2019 06:51 )  PTT:30.2 sec  LIVER FUNCTIONS - ( 18 Oct 2019 07:05 )  Alb: 3.2 g/dL / Pro: 5.7 g/dL / ALK PHOS: 60 U/L / ALT: 13 U/L / AST: 12 U/L / GGT: x           Lactate Dehydrogenase, Serum: 117 U/L (10-18 @ 07:05)       10-17 @ 08:51  Tacrolimus 3.4                Cyclosporine --    Cultures:   Culture - Urine (10.13.19 @ 04:27)    Specimen Source: .Urine    Culture Results:   No growth    Radiology:   < from: Xray Chest 1 View- PORTABLE-Routine (10.12.19 @ 09:00) >  IMPRESSION: Clear lungs.    Meds:   Antimicrobials:   acyclovir   Oral Tab/Cap 400 milliGRAM(s) Oral every 8 hours  cefepime   IVPB 2000 milliGRAM(s) IV Intermittent every 8 hours  clotrimazole Lozenge 1 Lozenge Oral five times a day  voriconazole 200 milliGRAM(s) Oral every 12 hours    Heme / Onc:   heparin  Infusion 339 Unit(s)/Hr IV Continuous <Continuous>    GI:  docusate sodium 100 milliGRAM(s) Oral three times a day  pantoprazole    Tablet 40 milliGRAM(s) Oral before breakfast  senna 2 Tablet(s) Oral at bedtime  sodium bicarbonate Mouth Rinse 10 milliLiter(s) Swish and Spit five times a day  ursodiol Capsule 300 milliGRAM(s) Oral two times a day with meals    Cardiovascular:   enalapril 2.5 milliGRAM(s) Oral daily  metoprolol succinate ER 12.5 milliGRAM(s) Oral two times a day  tamsulosin 0.4 milliGRAM(s) Oral at bedtime    Immunologic:   filgrastim-sndz Injectable 480 MICROGram(s) SubCutaneous daily  immune   globulin 10% (GAMMAGARD) IVPB 30 Gram(s) IV Intermittent <User Schedule>  mycophenolate mofetil 1000 milliGRAM(s) Oral <User Schedule>  tacrolimus 1.5 milliGRAM(s) Oral two times a day    Other medications:   acetaminophen   Tablet .. 650 milliGRAM(s) Oral every 6 hours  acetaminophen   Tablet .. 650 milliGRAM(s) Oral <User Schedule>  ALPRAZolam 0.5 milliGRAM(s) Oral at bedtime  Biotene Dry Mouth Oral Rinse 5 milliLiter(s) Swish and Spit five times a day  chlorhexidine 4% Liquid 1 Application(s) Topical <User Schedule>  diphenhydrAMINE   Injectable 25 milliGRAM(s) IV Push <User Schedule>  finasteride 5 milliGRAM(s) Oral daily  folic acid 1 milliGRAM(s) Oral daily  lidocaine/prilocaine Cream 1 Application(s) Topical daily  magnesium oxide 400 milliGRAM(s) Oral three times a day with meals  multivitamin 1 Tablet(s) Oral daily  sertraline 50 milliGRAM(s) Oral at bedtime  sodium chloride 0.9%. 1000 milliLiter(s) IV Continuous <Continuous>    PRN:   acetaminophen   Tablet .. 650 milliGRAM(s) Oral every 6 hours PRN  acetaminophen  IVPB .. 1000 milliGRAM(s) IV Intermittent once PRN  diphenhydrAMINE   Injectable 25 milliGRAM(s) IV Push every 4 hours PRN  metoclopramide Injectable 10 milliGRAM(s) IV Push every 6 hours PRN  sodium chloride 0.9% lock flush 10 milliLiter(s) IV Push every 1 hour PRN      A/P:   59 year old male with a history of AML  Pre :  Allogeneic PBSCT day +9  Strict I&O, daily weights, prn diuresis   Cardiology following. Continue current plan.   UA and BC from 10/10 (-)  Cytoxan 10/12,13  10/14- Start Tacro, Cellcept and Zarxio  10/17 increase taco 1.5 mg bid    1. Infectious Disease:   acyclovir   Oral Tab/Cap 400 milliGRAM(s) Oral every 8 hours  cefepime   IVPB 2000 milliGRAM(s) IV Intermittent every 8 hours  clotrimazole Lozenge 1 Lozenge Oral five times a day  voriconazole 200 milliGRAM(s) Oral every 12 hours    2. VOD Prophylaxis: Actigall, Glutamine, Heparin (dosed at 100 units / kg / day)     3. GI Prophylaxis:   pantoprazole    Tablet 40 milliGRAM(s) Oral before breakfast    4. Mouthcare - NS / NaHCO3 rinses, Mycelex, Biotene; Skin care     5. GVHD prophylaxis   mycophenolate mofetil 1000 milliGRAM(s) Oral <User Schedule>  tacrolimus     6. Transfuse & replete electrolytes prn   magnesium oxide  400mg po TID   thrombocytopenia-replace plt    7. IV hydration, daily weights, strict I&O, prn diuresis     8. PO intake as tolerated, nutrition follow up as needed, MVI, folic acid     9. Antiemetics, anti-diarrhea medications:   metoclopramide Injectable 10 milliGRAM(s) IV Push every 6 hours PRN  ondansetron Injectable 8 milliGRAM(s) IV Push every 8 hours  aprepitant 80 milliGRAM(s) Oral every 24 hours    10. OOB as tolerated, physical therapy consult if needed     11. Monitor coags / fibrinogen 2x week, vitamin K as needed     12. Monitor closely for clinical changes, monitor for fevers     13. Emotional support provided, plan of care discussed and questions addressed     14. Patient education done regarding plan of care, restrictions and discharge planning     15. Continue regular social work input     I have written the above note for Dr. Gaytan who performed service with me in the room.   Zulma Chow NP-C ([phone])    I have seen and examined patient with NP, I agree with above note as scribed. 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    S: Patient seen and examined with HPC Transplant Team:   no acute complaints     Denies mouth / tongue / throat pain, dyspnea, cough, nausea, vomiting, diarrhea, abdominal pain     O: Vitals:   Vital Signs Last 24 Hrs  T(C): 36.6 (18 Oct 2019 05:17), Max: 37.1 (18 Oct 2019 00:42)  T(F): 97.9 (18 Oct 2019 05:17), Max: 98.8 (18 Oct 2019 00:42)  HR: 91 (18 Oct 2019 05:17) (84 - 104)  BP: 131/80 (18 Oct 2019 05:17) (118/72 - 160/94)  BP(mean): --  RR: 18 (18 Oct 2019 05:17) (18 - 18)  SpO2: 96% (18 Oct 2019 05:17) (96% - 98%)    Admit weight: 81.5 kg   Daily Weight in k.8 (17 Oct 2019 10:10)  Today's weight: pending     Intake / Output:   10-17 @ 07:  -  10-18 @ 07:00  --------------------------------------------------------  IN: 1610 mL / OUT: 1400 mL / NET: 210 mL    PE:   Oropharynx: no ulceration or erythema  Oral Mucositis:   -                                             Grade: -  CVS: reg S1 S2  Lungs: CTA  Abdomen: soft, + normoactive BS, NT, ND  Extremities: No C/C/E. Toe amputation  Gastric Mucositis:     -                                             Grade: -  Intestinal Mucositis:      -                                        Grade: -  Skin: no rash  TLC:  C/D/I  Neuro: Alert and oriented x 3  Pain: none     Labs:   CBC Full  -  ( 18 Oct 2019 07:37 )  WBC Count : <0.10 K/uL  Hemoglobin : 7.3 g/dL  Hematocrit : 21.4 %  Platelet Count - Automated : 24 K/uL  Mean Cell Volume : 90.3 fl  Mean Cell Hemoglobin : 30.8 pg  Mean Cell Hemoglobin Concentration : 34.1 gm/dL  Auto Neutrophil # : x  Auto Lymphocyte # : x  Auto Monocyte # : x  Auto Eosinophil # : x  Auto Basophil # : x  Auto Neutrophil % : x  Auto Lymphocyte % : x  Auto Monocyte % : x  Auto Eosinophil % : x  Auto Basophil % : x                          7.3    <0.10 )-----------( 24       ( 18 Oct 2019 07:37 )             21.4     10-18    142  |  107  |  18  ----------------------------<  99  4.1   |  24  |  0.95    Ca    8.9      18 Oct 2019 07:05  Phos  3.0     10-18  Mg     1.6     10-18    TPro  5.7<L>  /  Alb  3.2<L>  /  TBili  0.4  /  DBili  x   /  AST  12  /  ALT  13  /  AlkPhos  60  10-18    PT/INR - ( 17 Oct 2019 06:51 )   PT: 12.0 sec;   INR: 1.04 ratio         PTT - ( 17 Oct 2019 06:51 )  PTT:30.2 sec  LIVER FUNCTIONS - ( 18 Oct 2019 07:05 )  Alb: 3.2 g/dL / Pro: 5.7 g/dL / ALK PHOS: 60 U/L / ALT: 13 U/L / AST: 12 U/L / GGT: x           Lactate Dehydrogenase, Serum: 117 U/L (10-18 @ 07:05)       10-17 @ 08:51  Tacrolimus 3.4                Cyclosporine --    Cultures:   Culture - Urine (10.13.19 @ 04:27)    Specimen Source: .Urine    Culture Results:   No growth    Radiology:   < from: Xray Chest 1 View- PORTABLE-Routine (10.12.19 @ 09:00) >  IMPRESSION: Clear lungs.    Meds:   Antimicrobials:   acyclovir   Oral Tab/Cap 400 milliGRAM(s) Oral every 8 hours  cefepime   IVPB 2000 milliGRAM(s) IV Intermittent every 8 hours  clotrimazole Lozenge 1 Lozenge Oral five times a day  voriconazole 200 milliGRAM(s) Oral every 12 hours    Heme / Onc:   heparin  Infusion 339 Unit(s)/Hr IV Continuous <Continuous>    GI:  docusate sodium 100 milliGRAM(s) Oral three times a day  pantoprazole    Tablet 40 milliGRAM(s) Oral before breakfast  senna 2 Tablet(s) Oral at bedtime  sodium bicarbonate Mouth Rinse 10 milliLiter(s) Swish and Spit five times a day  ursodiol Capsule 300 milliGRAM(s) Oral two times a day with meals    Cardiovascular:   enalapril 2.5 milliGRAM(s) Oral daily  metoprolol succinate ER 12.5 milliGRAM(s) Oral two times a day  tamsulosin 0.4 milliGRAM(s) Oral at bedtime    Immunologic:   filgrastim-sndz Injectable 480 MICROGram(s) SubCutaneous daily  immune   globulin 10% (GAMMAGARD) IVPB 30 Gram(s) IV Intermittent <User Schedule>  mycophenolate mofetil 1000 milliGRAM(s) Oral <User Schedule>  tacrolimus 1.5 milliGRAM(s) Oral two times a day    Other medications:   acetaminophen   Tablet .. 650 milliGRAM(s) Oral every 6 hours  acetaminophen   Tablet .. 650 milliGRAM(s) Oral <User Schedule>  ALPRAZolam 0.5 milliGRAM(s) Oral at bedtime  Biotene Dry Mouth Oral Rinse 5 milliLiter(s) Swish and Spit five times a day  chlorhexidine 4% Liquid 1 Application(s) Topical <User Schedule>  diphenhydrAMINE   Injectable 25 milliGRAM(s) IV Push <User Schedule>  finasteride 5 milliGRAM(s) Oral daily  folic acid 1 milliGRAM(s) Oral daily  lidocaine/prilocaine Cream 1 Application(s) Topical daily  magnesium oxide 400 milliGRAM(s) Oral three times a day with meals  multivitamin 1 Tablet(s) Oral daily  sertraline 50 milliGRAM(s) Oral at bedtime  sodium chloride 0.9%. 1000 milliLiter(s) IV Continuous <Continuous>    PRN:   acetaminophen   Tablet .. 650 milliGRAM(s) Oral every 6 hours PRN  acetaminophen  IVPB .. 1000 milliGRAM(s) IV Intermittent once PRN  diphenhydrAMINE   Injectable 25 milliGRAM(s) IV Push every 4 hours PRN  metoclopramide Injectable 10 milliGRAM(s) IV Push every 6 hours PRN  sodium chloride 0.9% lock flush 10 milliLiter(s) IV Push every 1 hour PRN    A/P:  59 year old male with a history of AML  Post Allogeneic PBSCT day +9  Strict I&O, daily weights, prn diuresis   Cardiology following. Continue current plan.   UA and BC from 10/10 (-)  Cytoxan 10/12, 13  10/14- Start Tacro, Cellcept and Zarxio  10/17 increase taco 1.5 mg bid    1. Infectious Disease:   acyclovir   Oral Tab/Cap 400 milliGRAM(s) Oral every 8 hours  cefepime   IVPB 2000 milliGRAM(s) IV Intermittent every 8 hours  clotrimazole Lozenge 1 Lozenge Oral five times a day  voriconazole 200 milliGRAM(s) Oral every 12 hours    2. VOD Prophylaxis: Actigall, Glutamine, Heparin (dosed at 100 units / kg / day)     3. GI Prophylaxis:   pantoprazole    Tablet 40 milliGRAM(s) Oral before breakfast    4. Mouthcare - NS / NaHCO3 rinses, Mycelex, Biotene; Skin care     5. GVHD prophylaxis   mycophenolate mofetil 1000 milliGRAM(s) Oral <User Schedule>  tacrolimus     6. Transfuse & replete electrolytes prn   magnesium oxide  400mg po TID   thrombocytopenia-replace plt    7. IV hydration, daily weights, strict I&O, prn diuresis     8. PO intake as tolerated, nutrition follow up as needed, MVI, folic acid     9. Antiemetics, anti-diarrhea medications:   metoclopramide Injectable 10 milliGRAM(s) IV Push every 6 hours PRN  ondansetron Injectable 8 milliGRAM(s) IV Push every 8 hours  aprepitant 80 milliGRAM(s) Oral every 24 hours    10. OOB as tolerated, physical therapy consult if needed     11. Monitor coags / fibrinogen 2x week, vitamin K as needed     12. Monitor closely for clinical changes, monitor for fevers     13. Emotional support provided, plan of care discussed and questions addressed     14. Patient education done regarding plan of care, restrictions and discharge planning     15. Continue regular social work input     I have written the above note for Dr. Gaytan who performed service with me in the room.   Zulma Chow NP-C ([phone])    I have seen and examined patient with NP, I agree with above note as scribed. 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    S: Patient seen and examined with HPC Transplant Team:   no acute complaints     Denies mouth / tongue / throat pain, dyspnea, cough, nausea, vomiting, diarrhea, abdominal pain     O: Vitals:   Vital Signs Last 24 Hrs  T(C): 36.6 (18 Oct 2019 05:17), Max: 37.1 (18 Oct 2019 00:42)  T(F): 97.9 (18 Oct 2019 05:17), Max: 98.8 (18 Oct 2019 00:42)  HR: 91 (18 Oct 2019 05:17) (84 - 104)  BP: 131/80 (18 Oct 2019 05:17) (118/72 - 160/94)  BP(mean): --  RR: 18 (18 Oct 2019 05:17) (18 - 18)  SpO2: 96% (18 Oct 2019 05:17) (96% - 98%)    Admit weight: 81.5 kg   Daily Weight in k.8 (17 Oct 2019 10:10)  Today's weight: 79.5 kg     Intake / Output:   10-17 @ 07:  -  10-18 @ 07:00  --------------------------------------------------------  IN: 1610 mL / OUT: 1400 mL / NET: 210 mL    PE:   Oropharynx: no ulceration or erythema  Oral Mucositis:   -                                             Grade: -  CVS: reg S1 S2  Lungs: CTA  Abdomen: soft, + normoactive BS, NT, ND  Extremities: No C/C/E. Toe amputation  Gastric Mucositis:     -                                             Grade: -  Intestinal Mucositis:      -                                        Grade: -  Skin: no rash  TLC:  C/D/I  Neuro: Alert and oriented x 3  Pain: none     Labs:   CBC Full  -  ( 18 Oct 2019 07:37 )  WBC Count : <0.10 K/uL  Hemoglobin : 7.3 g/dL  Hematocrit : 21.4 %  Platelet Count - Automated : 24 K/uL  Mean Cell Volume : 90.3 fl  Mean Cell Hemoglobin : 30.8 pg  Mean Cell Hemoglobin Concentration : 34.1 gm/dL  Auto Neutrophil # : x  Auto Lymphocyte # : x  Auto Monocyte # : x  Auto Eosinophil # : x  Auto Basophil # : x  Auto Neutrophil % : x  Auto Lymphocyte % : x  Auto Monocyte % : x  Auto Eosinophil % : x  Auto Basophil % : x                          7.3    <0.10 )-----------( 24       ( 18 Oct 2019 07:37 )             21.4     10-18    142  |  107  |  18  ----------------------------<  99  4.1   |  24  |  0.95    Ca    8.9      18 Oct 2019 07:05  Phos  3.0     10-18  Mg     1.6     10-18    TPro  5.7<L>  /  Alb  3.2<L>  /  TBili  0.4  /  DBili  x   /  AST  12  /  ALT  13  /  AlkPhos  60  10-18    PT/INR - ( 17 Oct 2019 06:51 )   PT: 12.0 sec;   INR: 1.04 ratio         PTT - ( 17 Oct 2019 06:51 )  PTT:30.2 sec  LIVER FUNCTIONS - ( 18 Oct 2019 07:05 )  Alb: 3.2 g/dL / Pro: 5.7 g/dL / ALK PHOS: 60 U/L / ALT: 13 U/L / AST: 12 U/L / GGT: x           Lactate Dehydrogenase, Serum: 117 U/L (10-18 @ 07:05)    10-17 @ 08:51  Tacrolimus 3.4                Cyclosporine --    Cultures:   Culture - Urine (10.13.19 @ 04:27)    Specimen Source: .Urine    Culture Results:   No growth    Radiology:   < from: Xray Chest 1 View- PORTABLE-Routine (10.12.19 @ 09:00) >  IMPRESSION: Clear lungs.    Meds:   Antimicrobials:   acyclovir   Oral Tab/Cap 400 milliGRAM(s) Oral every 8 hours  cefepime   IVPB 2000 milliGRAM(s) IV Intermittent every 8 hours  clotrimazole Lozenge 1 Lozenge Oral five times a day  voriconazole 200 milliGRAM(s) Oral every 12 hours    Heme / Onc:   heparin  Infusion 339 Unit(s)/Hr IV Continuous <Continuous>    GI:  docusate sodium 100 milliGRAM(s) Oral three times a day  pantoprazole    Tablet 40 milliGRAM(s) Oral before breakfast  senna 2 Tablet(s) Oral at bedtime  sodium bicarbonate Mouth Rinse 10 milliLiter(s) Swish and Spit five times a day  ursodiol Capsule 300 milliGRAM(s) Oral two times a day with meals    Cardiovascular:   enalapril 2.5 milliGRAM(s) Oral daily  metoprolol succinate ER 12.5 milliGRAM(s) Oral two times a day  tamsulosin 0.4 milliGRAM(s) Oral at bedtime    Immunologic:   filgrastim-sndz Injectable 480 MICROGram(s) SubCutaneous daily  immune   globulin 10% (GAMMAGARD) IVPB 30 Gram(s) IV Intermittent <User Schedule>  mycophenolate mofetil 1000 milliGRAM(s) Oral <User Schedule>  tacrolimus 1.5 milliGRAM(s) Oral two times a day    Other medications:   acetaminophen   Tablet .. 650 milliGRAM(s) Oral every 6 hours  acetaminophen   Tablet .. 650 milliGRAM(s) Oral <User Schedule>  ALPRAZolam 0.5 milliGRAM(s) Oral at bedtime  Biotene Dry Mouth Oral Rinse 5 milliLiter(s) Swish and Spit five times a day  chlorhexidine 4% Liquid 1 Application(s) Topical <User Schedule>  diphenhydrAMINE   Injectable 25 milliGRAM(s) IV Push <User Schedule>  finasteride 5 milliGRAM(s) Oral daily  folic acid 1 milliGRAM(s) Oral daily  lidocaine/prilocaine Cream 1 Application(s) Topical daily  magnesium oxide 400 milliGRAM(s) Oral three times a day with meals  multivitamin 1 Tablet(s) Oral daily  sertraline 50 milliGRAM(s) Oral at bedtime  sodium chloride 0.9%. 1000 milliLiter(s) IV Continuous <Continuous>    PRN:   acetaminophen   Tablet .. 650 milliGRAM(s) Oral every 6 hours PRN  acetaminophen  IVPB .. 1000 milliGRAM(s) IV Intermittent once PRN  diphenhydrAMINE   Injectable 25 milliGRAM(s) IV Push every 4 hours PRN  metoclopramide Injectable 10 milliGRAM(s) IV Push every 6 hours PRN  sodium chloride 0.9% lock flush 10 milliLiter(s) IV Push every 1 hour PRN    A/P:  59 year old male with a history of AML  Post Allogeneic PBSCT day +9  Strict I&O, daily weights, prn diuresis   Cardiology following   UA and BC from 10/10 (-)  Cytoxan 10/12, 13  10/14- Start Tacro, Cellcept and Zarxio  Suptherapeutic tacrolimus level on 10/17. Dose increased to 1.5 mg bid. Follow up repeat level on 10/19     1. Infectious Disease:   acyclovir   Oral Tab/Cap 400 milliGRAM(s) Oral every 8 hours  cefepime   IVPB 2000 milliGRAM(s) IV Intermittent every 8 hours  clotrimazole Lozenge 1 Lozenge Oral five times a day  voriconazole 200 milliGRAM(s) Oral every 12 hours    2. VOD Prophylaxis: Actigall, Glutamine, Heparin (dosed at 100 units / kg / day)     3. GI Prophylaxis:   pantoprazole    Tablet 40 milliGRAM(s) Oral before breakfast    4. Mouthcare - NS / NaHCO3 rinses, Mycelex, Biotene; Skin care     5. GVHD prophylaxis   mycophenolate mofetil 1000 milliGRAM(s) Oral <User Schedule>  tacrolimus 1.5 milliGRAM(s) Oral two times a day    6. Transfuse & replete electrolytes prn   magnesium oxide 400mg po TID   thrombocytopenia-replace plt    7. IV hydration, daily weights, strict I&O, prn diuresis     8. PO intake as tolerated, nutrition follow up as needed, MVI, folic acid     9. Antiemetics, anti-diarrhea medications:   metoclopramide Injectable 10 milliGRAM(s) IV Push every 6 hours PRN  ondansetron Injectable 8 milliGRAM(s) IV Push every 8 hours  aprepitant 80 milliGRAM(s) Oral every 24 hours    10. OOB as tolerated, physical therapy consult if needed     11. Monitor coags / fibrinogen 2x week, vitamin K as needed     12. Monitor closely for clinical changes, monitor for fevers     13. Emotional support provided, plan of care discussed and questions addressed     14. Patient education done regarding plan of care, restrictions and discharge planning     15. Continue regular social work input     I have written the above note for Dr. Gaytan who performed service with me in the room.   Zulma Chow NP-C ([phone])    I have seen and examined patient with NP, I agree with above note as scribed.

## 2019-10-18 NOTE — CHART NOTE - NSCHARTNOTEFT_GEN_A_CORE
Patient seen for nutrition follow up. Pt with AML s/p haploidentical PBSCT day +9. Pt currently febrile.    Source: Information obtained from pt & comprehensive chart review completed.    Diet : Regular + Glutasolve 1 packet daily.    Patient reports continued decreased appetite. Pt reports onset of nausea today; difficulty consuming milkshake, receiving anti-emetics. Pt denies vomiting, no reported GI distress, diarrhea resolved; last BM 10/18. Pt has resumed use of dentures, gum swelling resolved; able to chew solid foods.    PO intake : Pt reports po intake of ~25% of 2 meals per day for past few days (puddings, applesauce, magic cup). Pt reports poor intake today due to nausea but communicates he will reattempt consuming milkshake after anti-emetic has set in. Pt reports good intake of homemade chicken from girlfriend yesterday 10/17/19.     Source for PO intake: Pt, nursing flowsheets.    Weights, standing: (10/18/19) 175.2 lbs, (10/15/19) 173.7 lbs, noted admission weight (10/3/19) 179 lbs  Pt noted with trending weights, likely related to fluid shifts; however pt remaining stable between ~170-180 lbs.    Pertinent Medications: Colace, senna, reglan prn, protonix, magnesium oxide, folic acid, MVI, biotene mouth rinse, sodium bicarbonate mouth rinse, IV NaCl 0.9% @ 20 mL/hr  Pertinent Labs: Reviewed    As per nursing documentation: skin intact, no edema noted at this time.    Estimated Needs:   [X] no change since previous assessment    Previous Nutrition Diagnosis: [X] Predicted suboptimal energy intake  Nutrition Diagnosis is: [X] Escalated to diagnosis below    New Nutrition Diagnosis: Inadequate energy intake  Related to: decreased appetite, side effects of chemotherapy  As evidenced by: pt reported poor po intake, energy consumption <75% of estimated needs.    Interventions:   1) Reinforced importance of adequate po intake to maintain weight & meet estimated energy and nutrient needs.  2) Will continue to provide Magic Cup ice cream 2x daily(provides 290 gerald, 9gm protein per 4 oz serving).  3) Encouraged po intake of smaller, more frequent calorically & protein dense snacks to assist in meeting needs.  4) Recommended intake of bland foods to decrease nausea symptoms.  5) Continue to monitor po intake, tolerance to diet prescription, weights, nutrition related labs, BMs/GI distress.    RD remains available upon request and will follow up per protocol.    Letty Kramer, Dietetic Intern Pager #[phone] Patient seen for nutrition follow up. Pt with AML s/p haploidentical PBSCT day +9. Pt currently febrile.    Source: Information obtained from pt & comprehensive chart review completed.    Diet : Regular + Glutasolve 1 packet daily.    Patient reports continued decreased appetite. Pt reports onset of nausea today; difficulty consuming milkshake, receiving anti-emetics. Pt denies vomiting, no reported GI distress, diarrhea resolved; last BM 10/18. Pt has resumed use of dentures, gum swelling resolved; able to chew solid foods.    PO intake : Pt reports po intake of ~25% of 2 meals per day for past few days (puddings, applesauce, magic cup). Pt reports poor intake today due to nausea but communicates he will reattempt consuming milkshake after anti-emetic has set in. Pt reports good intake of homemade chicken from girlfriend yesterday 10/17/19.     Source for PO intake: Pt, nursing flowsheets.    Weights, standing: (10/18/19) 175.2 lbs, (10/15/19) 173.7 lbs, noted admission weight (10/3/19) 179 lbs  Pt noted with trending weights, likely related to fluid shifts; however pt remaining stable between ~170-180 lbs.    Pertinent Medications: Colace, senna, reglan prn, protonix, magnesium oxide, folic acid, MVI, biotene mouth rinse, sodium bicarbonate mouth rinse, IV NaCl 0.9% @ 20 mL/hr  Pertinent Labs: Reviewed    As per nursing documentation: skin intact, no edema noted at this time.    Estimated Needs:   [X] no change since previous assessment    Previous Nutrition Diagnosis: [X] Predicted suboptimal energy intake  Nutrition Diagnosis is: [X] Escalated to diagnosis below    New Nutrition Diagnosis: Inadequate energy intake  Related to: decreased appetite, side effects of chemotherapy  As evidenced by: pt reported poor po intake, energy consumption <75% of estimated needs.    Interventions:   1) Reinforced importance of adequate po intake to maintain weight & meet estimated energy and nutrient needs.  2) Will continue to provide Magic Cup ice cream 2x daily(provides 290 gerald, 9gm protein per 4 oz serving).  3) Encouraged po intake of smaller, more frequent calorically & protein dense snacks to assist in meeting needs.  4) Recommended consuming bland foods to maintain intake with nausea present.  5) Continue to monitor po intake, tolerance to diet prescription, weights, nutrition related labs, BMs/GI distress.    RD remains available upon request and will follow up per protocol.    Letty Kramer, Dietetic Intern Pager #[phone] Patient seen for nutrition follow up. Pt with AML s/p haploidentical PBSCT day +9. Pt currently febrile.    Source: Information obtained from pt & comprehensive chart review completed.    Diet : Regular + Glutasolve 1 packet daily.    Patient reports continued decreased appetite. Pt reports onset of nausea today; difficulty consuming milkshake, receiving anti-emetics. Pt denies vomiting, no reported GI distress, diarrhea resolved; last BM 10/18. Pt has resumed use of dentures, gum swelling resolved; able to chew solid foods.    PO intake : Pt reports po intake of ~25% of 2 meals per day for past few days (puddings, applesauce, magic cup). Pt reports poor intake today due to nausea but communicates he will reattempt consuming milkshake after anti-emetic has set in. Pt reports good intake of homemade chicken from girlfriend yesterday 10/17/19.     Source for PO intake: Pt, nursing flowsheets.    Weights, standing: (10/18/19) 175.2 lbs, (10/15/19) 173.7 lbs, noted admission weight (10/3/19) 179 lbs  Pt noted with trending weights, likely related to fluid shifts; however pt remaining stable between ~170-180 lbs.    Pertinent Medications: Colace, senna, reglan prn, protonix, magnesium oxide, folic acid, MVI, biotene mouth rinse, sodium bicarbonate mouth rinse, IV NaCl 0.9% @ 20 mL/hr  Pertinent Labs: Reviewed    As per nursing documentation: skin intact, no edema noted at this time.    Estimated Needs:   [X] no change since previous assessment    Previous Nutrition Diagnosis: [X] Predicted suboptimal energy intake  Nutrition Diagnosis is: [X] Escalated to diagnosis below    New Nutrition Diagnosis: Inadequate oral intake  Related to: decreased appetite, side effects of chemotherapy  As evidenced by: pt reported poor po intake, energy consumption <75% of estimated needs.    Interventions:   1) Reinforced importance of adequate po intake to maintain weight & meet estimated energy and nutrient needs.  2) Will continue to provide Magic Cup ice cream 2x daily(provides 290 gerald, 9gm protein per 4 oz serving).  3) Encouraged po intake of smaller, more frequent calorically & protein dense snacks to assist in meeting needs.  4) Recommended consuming bland foods to maintain intake with nausea present.  5) Continue to monitor po intake, tolerance to diet prescription, weights, nutrition related labs, BMs/GI distress.    RD remains available upon request and will follow up per protocol.    Letty Kramer, Dietetic Intern Pager #[phone]

## 2019-10-18 NOTE — PROGRESS NOTE ADULT - ASSESSMENT
59 year old male, with previously diagnosed acute myeloid leukemia. s/p induction chemotherapy with Daunorubicin/Cytarabine and HIDAC consolidation chemotherapy, now admitted for Haplo-identical stem cell transplant from son:    - No evidence of volume overload at present.  He is on fluids and has been getting IV Lasix prn to maintain net negative.  His recent CXR is clear.  - Continue to maintain strict I's and O's, and to monitor for signs of volume overload, which he is at risk for.  - No evidence of acute ischemia  - BP is overall stable, and has been higher than usual  - Continue ACEI  and BB at current doses as tolerated by BP. He has been able to get them over the last 72 hours.  - On heparin gtt for hx of dvt/pe and hypercoagulable state, and adjust rate per protocol. Monitor platelet count which is now 24.  - Monitor and replete lytes  - To follow while admitted

## 2019-10-19 LAB
ALBUMIN SERPL ELPH-MCNC: 3.5 G/DL — SIGNIFICANT CHANGE UP (ref 3.3–5)
ALP SERPL-CCNC: 62 U/L — SIGNIFICANT CHANGE UP (ref 40–120)
ALT FLD-CCNC: 14 U/L — SIGNIFICANT CHANGE UP (ref 10–45)
ANION GAP SERPL CALC-SCNC: 13 MMOL/L — SIGNIFICANT CHANGE UP (ref 5–17)
AST SERPL-CCNC: 11 U/L — SIGNIFICANT CHANGE UP (ref 10–40)
BILIRUB SERPL-MCNC: 0.6 MG/DL — SIGNIFICANT CHANGE UP (ref 0.2–1.2)
BUN SERPL-MCNC: 17 MG/DL — SIGNIFICANT CHANGE UP (ref 7–23)
CALCIUM SERPL-MCNC: 9.3 MG/DL — SIGNIFICANT CHANGE UP (ref 8.4–10.5)
CHLORIDE SERPL-SCNC: 105 MMOL/L — SIGNIFICANT CHANGE UP (ref 96–108)
CO2 SERPL-SCNC: 23 MMOL/L — SIGNIFICANT CHANGE UP (ref 22–31)
CREAT SERPL-MCNC: 0.8 MG/DL — SIGNIFICANT CHANGE UP (ref 0.5–1.3)
GLUCOSE SERPL-MCNC: 101 MG/DL — HIGH (ref 70–99)
HCT VFR BLD CALC: 22.3 % — LOW (ref 39–50)
HGB BLD-MCNC: 7.6 G/DL — LOW (ref 13–17)
LDH SERPL L TO P-CCNC: 112 U/L — SIGNIFICANT CHANGE UP (ref 50–242)
MAGNESIUM SERPL-MCNC: 1.7 MG/DL — SIGNIFICANT CHANGE UP (ref 1.6–2.6)
MCHC RBC-ENTMCNC: 31 PG — SIGNIFICANT CHANGE UP (ref 27–34)
MCHC RBC-ENTMCNC: 34.1 GM/DL — SIGNIFICANT CHANGE UP (ref 32–36)
MCV RBC AUTO: 91 FL — SIGNIFICANT CHANGE UP (ref 80–100)
NRBC # BLD: 0 /100 WBCS — SIGNIFICANT CHANGE UP (ref 0–0)
PHOSPHATE SERPL-MCNC: 3.2 MG/DL — SIGNIFICANT CHANGE UP (ref 2.5–4.5)
PLATELET # BLD AUTO: 12 K/UL — CRITICAL LOW (ref 150–400)
POTASSIUM SERPL-MCNC: 4 MMOL/L — SIGNIFICANT CHANGE UP (ref 3.5–5.3)
POTASSIUM SERPL-SCNC: 4 MMOL/L — SIGNIFICANT CHANGE UP (ref 3.5–5.3)
PROT SERPL-MCNC: 5.9 G/DL — LOW (ref 6–8.3)
RBC # BLD: 2.45 M/UL — LOW (ref 4.2–5.8)
RBC # FLD: 17.2 % — HIGH (ref 10.3–14.5)
SODIUM SERPL-SCNC: 141 MMOL/L — SIGNIFICANT CHANGE UP (ref 135–145)
TACROLIMUS SERPL-MCNC: 4.3 NG/ML — SIGNIFICANT CHANGE UP
WBC # BLD: <0.1 K/UL — CRITICAL LOW (ref 3.8–10.5)
WBC # FLD AUTO: <0.1 K/UL — CRITICAL LOW (ref 3.8–10.5)

## 2019-10-19 PROCEDURE — 99291 CRITICAL CARE FIRST HOUR: CPT

## 2019-10-19 PROCEDURE — 99232 SBSQ HOSP IP/OBS MODERATE 35: CPT

## 2019-10-19 RX ORDER — ALPRAZOLAM 0.25 MG
0.5 TABLET ORAL AT BEDTIME
Refills: 0 | Status: DISCONTINUED | OUTPATIENT
Start: 2019-10-19 | End: 2019-10-22

## 2019-10-19 RX ORDER — ONDANSETRON 8 MG/1
8 TABLET, FILM COATED ORAL EVERY 8 HOURS
Refills: 0 | Status: DISCONTINUED | OUTPATIENT
Start: 2019-10-19 | End: 2019-11-01

## 2019-10-19 RX ADMIN — CEFEPIME 100 MILLIGRAM(S): 1 INJECTION, POWDER, FOR SOLUTION INTRAMUSCULAR; INTRAVENOUS at 14:12

## 2019-10-19 RX ADMIN — Medication 10 MILLILITER(S): at 20:03

## 2019-10-19 RX ADMIN — Medication 2.5 MILLIGRAM(S): at 05:40

## 2019-10-19 RX ADMIN — URSODIOL 300 MILLIGRAM(S): 250 TABLET, FILM COATED ORAL at 17:09

## 2019-10-19 RX ADMIN — Medication 1 LOZENGE: at 20:03

## 2019-10-19 RX ADMIN — URSODIOL 300 MILLIGRAM(S): 250 TABLET, FILM COATED ORAL at 08:18

## 2019-10-19 RX ADMIN — PANTOPRAZOLE SODIUM 40 MILLIGRAM(S): 20 TABLET, DELAYED RELEASE ORAL at 06:42

## 2019-10-19 RX ADMIN — Medication 12.5 MILLIGRAM(S): at 05:40

## 2019-10-19 RX ADMIN — Medication 5 MILLILITER(S): at 16:09

## 2019-10-19 RX ADMIN — Medication 400 MILLIGRAM(S): at 14:12

## 2019-10-19 RX ADMIN — SODIUM CHLORIDE 20 MILLILITER(S): 9 INJECTION INTRAMUSCULAR; INTRAVENOUS; SUBCUTANEOUS at 20:02

## 2019-10-19 RX ADMIN — MAGNESIUM OXIDE 400 MG ORAL TABLET 400 MILLIGRAM(S): 241.3 TABLET ORAL at 17:08

## 2019-10-19 RX ADMIN — Medication 5 MILLILITER(S): at 08:19

## 2019-10-19 RX ADMIN — VORICONAZOLE 200 MILLIGRAM(S): 10 INJECTION, POWDER, LYOPHILIZED, FOR SOLUTION INTRAVENOUS at 05:39

## 2019-10-19 RX ADMIN — ONDANSETRON 8 MILLIGRAM(S): 8 TABLET, FILM COATED ORAL at 10:30

## 2019-10-19 RX ADMIN — CEFEPIME 100 MILLIGRAM(S): 1 INJECTION, POWDER, FOR SOLUTION INTRAMUSCULAR; INTRAVENOUS at 05:46

## 2019-10-19 RX ADMIN — CEFEPIME 100 MILLIGRAM(S): 1 INJECTION, POWDER, FOR SOLUTION INTRAMUSCULAR; INTRAVENOUS at 21:44

## 2019-10-19 RX ADMIN — Medication 1 LOZENGE: at 08:19

## 2019-10-19 RX ADMIN — TACROLIMUS 1.5 MILLIGRAM(S): 5 CAPSULE ORAL at 06:45

## 2019-10-19 RX ADMIN — Medication 1 LOZENGE: at 11:46

## 2019-10-19 RX ADMIN — Medication 5 MILLILITER(S): at 11:46

## 2019-10-19 RX ADMIN — MYCOPHENOLATE MOFETIL 1000 MILLIGRAM(S): 250 CAPSULE ORAL at 14:12

## 2019-10-19 RX ADMIN — Medication 400 MILLIGRAM(S): at 21:45

## 2019-10-19 RX ADMIN — SERTRALINE 50 MILLIGRAM(S): 25 TABLET, FILM COATED ORAL at 21:45

## 2019-10-19 RX ADMIN — Medication 480 MICROGRAM(S): at 16:09

## 2019-10-19 RX ADMIN — Medication 10 MILLILITER(S): at 08:19

## 2019-10-19 RX ADMIN — VORICONAZOLE 200 MILLIGRAM(S): 10 INJECTION, POWDER, LYOPHILIZED, FOR SOLUTION INTRAVENOUS at 17:09

## 2019-10-19 RX ADMIN — MAGNESIUM OXIDE 400 MG ORAL TABLET 400 MILLIGRAM(S): 241.3 TABLET ORAL at 08:18

## 2019-10-19 RX ADMIN — Medication 0.5 MILLIGRAM(S): at 21:55

## 2019-10-19 RX ADMIN — Medication 1 LOZENGE: at 16:09

## 2019-10-19 RX ADMIN — FINASTERIDE 5 MILLIGRAM(S): 5 TABLET, FILM COATED ORAL at 11:45

## 2019-10-19 RX ADMIN — Medication 1 MILLIGRAM(S): at 11:45

## 2019-10-19 RX ADMIN — MYCOPHENOLATE MOFETIL 1000 MILLIGRAM(S): 250 CAPSULE ORAL at 08:19

## 2019-10-19 RX ADMIN — Medication 10 MILLILITER(S): at 11:46

## 2019-10-19 RX ADMIN — Medication 12.5 MILLIGRAM(S): at 17:09

## 2019-10-19 RX ADMIN — Medication 5 MILLILITER(S): at 20:03

## 2019-10-19 RX ADMIN — MAGNESIUM OXIDE 400 MG ORAL TABLET 400 MILLIGRAM(S): 241.3 TABLET ORAL at 11:46

## 2019-10-19 RX ADMIN — MYCOPHENOLATE MOFETIL 1000 MILLIGRAM(S): 250 CAPSULE ORAL at 21:44

## 2019-10-19 RX ADMIN — Medication 1 TABLET(S): at 11:45

## 2019-10-19 RX ADMIN — TACROLIMUS 1.5 MILLIGRAM(S): 5 CAPSULE ORAL at 17:08

## 2019-10-19 RX ADMIN — Medication 10 MILLILITER(S): at 16:09

## 2019-10-19 RX ADMIN — HEPARIN SODIUM 3.39 UNIT(S)/HR: 5000 INJECTION INTRAVENOUS; SUBCUTANEOUS at 20:02

## 2019-10-19 RX ADMIN — Medication 400 MILLIGRAM(S): at 05:38

## 2019-10-19 RX ADMIN — TAMSULOSIN HYDROCHLORIDE 0.4 MILLIGRAM(S): 0.4 CAPSULE ORAL at 21:45

## 2019-10-19 NOTE — PROGRESS NOTE ADULT - ASSESSMENT
59 year old male, with previously diagnosed acute myeloid leukemia. s/p induction chemotherapy with Daunorubicin/Cytarabine and HIDAC consolidation chemotherapy, now admitted for Haplo-identical stem cell transplant from son:    - No evidence of volume overload at present.  He is on fluids and has been getting IV Lasix prn to maintain net negative.  His recent CXR is clear.  - Continue to maintain strict I's and O's, and to monitor for signs of volume overload, which he is at risk for.  - No evidence of acute ischemia  - BP is overall stable, and has been higher than usual  - Continue ACEI  and BB at current doses as tolerated by BP. He has been able to get them over the last 72 hours.  - On heparin gtt for hx of dvt/pe and hypercoagulable state, and adjust rate per protocol. Monitor platelet count which is now 12.  No bleeding.  - Monitor and replete lytes  - To follow while admitted

## 2019-10-19 NOTE — PROGRESS NOTE ADULT - SUBJECTIVE AND OBJECTIVE BOX
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    S: Patient seen and examined with HPC Transplant Team:   no acute complaints     Denies mouth / tongue / throat pain, dyspnea, cough, nausea, vomiting, diarrhea, abdominal pain     O: Vitals:   Vital Signs Last 24 Hrs  T(C): 36.2 (19 Oct 2019 05:35), Max: 36.8 (18 Oct 2019 17:48)  T(F): 97.2 (19 Oct 2019 05:35), Max: 98.2 (18 Oct 2019 17:48)  HR: 83 (19 Oct 2019 05:35) (83 - 99)  BP: 126/80 (19 Oct 2019 05:35) (106/74 - 128/77)  BP(mean): 18 (18 Oct 2019 17:48) (18 - 18)  RR: 18 (19 Oct 2019 05:35) (18 - 18)  SpO2: 98% (19 Oct 2019 05:35) (97% - 100%)    Admit weight: 81.5 kg   Daily Weight in k.5 (18 Oct 2019 09:27)  Today's weight: pending     Intake / Output:   10-18 @ 07:01  -  10-19 @ 07:00  --------------------------------------------------------  IN: 1824.1 mL / OUT: 2000 mL / NET: -175.9 mL    PE:   Oropharynx: no ulceration or erythema  Oral Mucositis:   -                                             Grade: -  CVS: reg S1 S2  Lungs: CTA  Abdomen: soft, + normoactive BS, NT, ND  Extremities: No C/C/E. Toe amputation  Gastric Mucositis:     -                                             Grade: -  Intestinal Mucositis:      -                                        Grade: -  Skin: no rash  TLC:  C/D/I  Neuro: Alert and oriented x 3  Pain: none     Labs:   CBC Full  -  ( 19 Oct 2019 07:01 )  WBC Count : <0.10 K/uL  Hemoglobin : 7.6 g/dL  Hematocrit : 22.3 %  Platelet Count - Automated : 12 K/uL  Mean Cell Volume : 91.0 fl  Mean Cell Hemoglobin : 31.0 pg  Mean Cell Hemoglobin Concentration : 34.1 gm/dL  Auto Neutrophil # : x  Auto Lymphocyte # : x  Auto Monocyte # : x  Auto Eosinophil # : x  Auto Basophil # : x  Auto Neutrophil % : x  Auto Lymphocyte % : x  Auto Monocyte % : x  Auto Eosinophil % : x  Auto Basophil % : x                          7.6    <0.10 )-----------( 12       ( 19 Oct 2019 07:01 )             22.3     10-19    141  |  105  |  17  ----------------------------<  101<H>  4.0   |  23  |  0.80    Ca    9.3      19 Oct 2019 07:01  Phos  3.2     10-19  Mg     1.7     10-19    TPro  5.9<L>  /  Alb  3.5  /  TBili  0.6  /  DBili  x   /  AST  11  /  ALT  14  /  AlkPhos  62  10-19      LIVER FUNCTIONS - ( 19 Oct 2019 07:01 )  Alb: 3.5 g/dL / Pro: 5.9 g/dL / ALK PHOS: 62 U/L / ALT: 14 U/L / AST: 11 U/L / GGT: x           Lactate Dehydrogenase, Serum: 112 U/L (10-19 @ 07:01)    Cultures:   Culture - Urine (10.13.19 @ 04:27)    Specimen Source: .Urine    Culture Results:   No growth    Radiology:   < from: Xray Chest 1 View- PORTABLE-Routine (10.12.19 @ 09:00) >  IMPRESSION: Clear lungs.    Meds:   Antimicrobials:   acyclovir   Oral Tab/Cap 400 milliGRAM(s) Oral every 8 hours  cefepime   IVPB 2000 milliGRAM(s) IV Intermittent every 8 hours  clotrimazole Lozenge 1 Lozenge Oral five times a day  voriconazole 200 milliGRAM(s) Oral every 12 hours    Heme / Onc:   heparin  Infusion 339 Unit(s)/Hr IV Continuous <Continuous>    GI:  docusate sodium 100 milliGRAM(s) Oral three times a day  pantoprazole    Tablet 40 milliGRAM(s) Oral before breakfast  senna 2 Tablet(s) Oral at bedtime  sodium bicarbonate Mouth Rinse 10 milliLiter(s) Swish and Spit five times a day  ursodiol Capsule 300 milliGRAM(s) Oral two times a day with meals    Cardiovascular:   enalapril 2.5 milliGRAM(s) Oral daily  metoprolol succinate ER 12.5 milliGRAM(s) Oral two times a day  tamsulosin 0.4 milliGRAM(s) Oral at bedtime    Immunologic:   filgrastim-sndz Injectable 480 MICROGram(s) SubCutaneous daily  immune   globulin 10% (GAMMAGARD) IVPB 30 Gram(s) IV Intermittent <User Schedule>  mycophenolate mofetil 1000 milliGRAM(s) Oral <User Schedule>  tacrolimus 1.5 milliGRAM(s) Oral two times a day    Other medications:   acetaminophen   Tablet .. 650 milliGRAM(s) Oral every 6 hours  acetaminophen   Tablet .. 650 milliGRAM(s) Oral <User Schedule>  ALPRAZolam 0.5 milliGRAM(s) Oral at bedtime  Biotene Dry Mouth Oral Rinse 5 milliLiter(s) Swish and Spit five times a day  chlorhexidine 4% Liquid 1 Application(s) Topical <User Schedule>  diphenhydrAMINE   Injectable 25 milliGRAM(s) IV Push <User Schedule>  finasteride 5 milliGRAM(s) Oral daily  folic acid 1 milliGRAM(s) Oral daily  lidocaine/prilocaine Cream 1 Application(s) Topical daily  magnesium oxide 400 milliGRAM(s) Oral three times a day with meals  multivitamin 1 Tablet(s) Oral daily  sertraline 50 milliGRAM(s) Oral at bedtime  sodium chloride 0.9%. 1000 milliLiter(s) IV Continuous <Continuous>      PRN:   acetaminophen   Tablet .. 650 milliGRAM(s) Oral every 6 hours PRN  acetaminophen  IVPB .. 1000 milliGRAM(s) IV Intermittent once PRN  diphenhydrAMINE   Injectable 25 milliGRAM(s) IV Push every 4 hours PRN  metoclopramide Injectable 10 milliGRAM(s) IV Push every 6 hours PRN  sodium chloride 0.9% lock flush 10 milliLiter(s) IV Push every 1 hour PRN      A/P: 59 year old male with a history of AML  Post Allogeneic PBSCT day +10  Strict I&O, daily weights, prn diuresis   Cardiology following   UA and BC from 10/10 (-)  Cytoxan 10/12, 13  10/14- Start Tacro, Cellcept and Zarxio  Suptherapeutic tacrolimus level on 10/17. Dose increased to 1.5 mg bid. Follow up repeat level on 10/19     1. Infectious Disease:   acyclovir   Oral Tab/Cap 400 milliGRAM(s) Oral every 8 hours  cefepime   IVPB 2000 milliGRAM(s) IV Intermittent every 8 hours  clotrimazole Lozenge 1 Lozenge Oral five times a day  voriconazole 200 milliGRAM(s) Oral every 12 hours    2. VOD Prophylaxis: Actigall, Glutamine, Heparin (dosed at 100 units / kg / day)     3. GI Prophylaxis:   pantoprazole    Tablet 40 milliGRAM(s) Oral before breakfast    4. Mouthcare - NS / NaHCO3 rinses, Mycelex, Biotene; Skin care     5. GVHD prophylaxis   mycophenolate mofetil 1000 milliGRAM(s) Oral <User Schedule>  tacrolimus 1.5 milliGRAM(s) Oral two times a day    6. Transfuse & replete electrolytes prn   magnesium oxide 400mg po TID   thrombocytopenia-replace plt    7. IV hydration, daily weights, strict I&O, prn diuresis     8. PO intake as tolerated, nutrition follow up as needed, MVI, folic acid     9. Antiemetics, anti-diarrhea medications:   metoclopramide Injectable 10 milliGRAM(s) IV Push every 6 hours PRN  ondansetron Injectable 8 milliGRAM(s) IV Push every 8 hours  aprepitant 80 milliGRAM(s) Oral every 24 hours    10. OOB as tolerated, physical therapy consult if needed     11. Monitor coags / fibrinogen 2x week, vitamin K as needed     12. Monitor closely for clinical changes, monitor for fevers     13. Emotional support provided, plan of care discussed and questions addressed     14. Patient education done regarding plan of care, restrictions and discharge planning     15. Continue regular social work input     I have written the above note for Dr. Gaytan who performed service with me in the room.   Zulma Chow NP-C ([phone])    I have seen and examined patient with NP, I agree with above note as scribed. 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    S: Patient seen and examined with HPC Transplant Team:   no acute complaints     Denies mouth / tongue / throat pain, dyspnea, cough, nausea, vomiting, diarrhea, abdominal pain     O: Vitals:   Vital Signs Last 24 Hrs  T(C): 36.2 (19 Oct 2019 05:35), Max: 36.8 (18 Oct 2019 17:48)  T(F): 97.2 (19 Oct 2019 05:35), Max: 98.2 (18 Oct 2019 17:48)  HR: 83 (19 Oct 2019 05:35) (83 - 99)  BP: 126/80 (19 Oct 2019 05:35) (106/74 - 128/77)  BP(mean): 18 (18 Oct 2019 17:48) (18 - 18)  RR: 18 (19 Oct 2019 05:35) (18 - 18)  SpO2: 98% (19 Oct 2019 05:35) (97% - 100%)    Admit weight: 81.5 kg   Daily Weight in k.5 (18 Oct 2019 09:27)  Today's weight: pending     Intake / Output:   10-18 @ 07:01  -  10-19 @ 07:00  --------------------------------------------------------  IN: 1824.1 mL / OUT: 2000 mL / NET: -175.9 mL    PE:   Oropharynx: no ulceration or erythema  Oral Mucositis:   -                                             Grade: -  CVS: reg S1 S2  Lungs: CTA  Abdomen: soft, + normoactive BS, NT, ND  Extremities: No C/C/E. Toe amputation  Gastric Mucositis:     -                                             Grade: -  Intestinal Mucositis:      -                                        Grade: -  Skin: no rash  TLC:  C/D/I  Neuro: Alert and oriented x 3  Pain: none     Labs:   CBC Full  -  ( 19 Oct 2019 07:01 )  WBC Count : <0.10 K/uL  Hemoglobin : 7.6 g/dL  Hematocrit : 22.3 %  Platelet Count - Automated : 12 K/uL  Mean Cell Volume : 91.0 fl  Mean Cell Hemoglobin : 31.0 pg  Mean Cell Hemoglobin Concentration : 34.1 gm/dL  Auto Neutrophil # : x  Auto Lymphocyte # : x  Auto Monocyte # : x  Auto Eosinophil # : x  Auto Basophil # : x  Auto Neutrophil % : x  Auto Lymphocyte % : x  Auto Monocyte % : x  Auto Eosinophil % : x  Auto Basophil % : x                          7.6    <0.10 )-----------( 12       ( 19 Oct 2019 07:01 )             22.3     10-19    141  |  105  |  17  ----------------------------<  101<H>  4.0   |  23  |  0.80    Ca    9.3      19 Oct 2019 07:01  Phos  3.2     10-19  Mg     1.7     10-19    TPro  5.9<L>  /  Alb  3.5  /  TBili  0.6  /  DBili  x   /  AST  11  /  ALT  14  /  AlkPhos  62  10-19      LIVER FUNCTIONS - ( 19 Oct 2019 07:01 )  Alb: 3.5 g/dL / Pro: 5.9 g/dL / ALK PHOS: 62 U/L / ALT: 14 U/L / AST: 11 U/L / GGT: x           Lactate Dehydrogenase, Serum: 112 U/L (10-19 @ 07:01)    Cultures:   Culture - Urine (10.13.19 @ 04:27)    Specimen Source: .Urine    Culture Results:   No growth    Radiology:   < from: Xray Chest 1 View- PORTABLE-Routine (10.12.19 @ 09:00) >  IMPRESSION: Clear lungs.    Meds:   Antimicrobials:   acyclovir   Oral Tab/Cap 400 milliGRAM(s) Oral every 8 hours  cefepime   IVPB 2000 milliGRAM(s) IV Intermittent every 8 hours  clotrimazole Lozenge 1 Lozenge Oral five times a day  voriconazole 200 milliGRAM(s) Oral every 12 hours    Heme / Onc:   heparin  Infusion 339 Unit(s)/Hr IV Continuous <Continuous>    GI:  docusate sodium 100 milliGRAM(s) Oral three times a day  pantoprazole    Tablet 40 milliGRAM(s) Oral before breakfast  senna 2 Tablet(s) Oral at bedtime  sodium bicarbonate Mouth Rinse 10 milliLiter(s) Swish and Spit five times a day  ursodiol Capsule 300 milliGRAM(s) Oral two times a day with meals    Cardiovascular:   enalapril 2.5 milliGRAM(s) Oral daily  metoprolol succinate ER 12.5 milliGRAM(s) Oral two times a day  tamsulosin 0.4 milliGRAM(s) Oral at bedtime    Immunologic:   filgrastim-sndz Injectable 480 MICROGram(s) SubCutaneous daily  immune   globulin 10% (GAMMAGARD) IVPB 30 Gram(s) IV Intermittent <User Schedule>  mycophenolate mofetil 1000 milliGRAM(s) Oral <User Schedule>  tacrolimus 1.5 milliGRAM(s) Oral two times a day    Other medications:   acetaminophen   Tablet .. 650 milliGRAM(s) Oral every 6 hours  acetaminophen   Tablet .. 650 milliGRAM(s) Oral <User Schedule>  ALPRAZolam 0.5 milliGRAM(s) Oral at bedtime  Biotene Dry Mouth Oral Rinse 5 milliLiter(s) Swish and Spit five times a day  chlorhexidine 4% Liquid 1 Application(s) Topical <User Schedule>  diphenhydrAMINE   Injectable 25 milliGRAM(s) IV Push <User Schedule>  finasteride 5 milliGRAM(s) Oral daily  folic acid 1 milliGRAM(s) Oral daily  lidocaine/prilocaine Cream 1 Application(s) Topical daily  magnesium oxide 400 milliGRAM(s) Oral three times a day with meals  multivitamin 1 Tablet(s) Oral daily  sertraline 50 milliGRAM(s) Oral at bedtime  sodium chloride 0.9%. 1000 milliLiter(s) IV Continuous <Continuous>    PRN:   acetaminophen   Tablet .. 650 milliGRAM(s) Oral every 6 hours PRN  acetaminophen  IVPB .. 1000 milliGRAM(s) IV Intermittent once PRN  diphenhydrAMINE   Injectable 25 milliGRAM(s) IV Push every 4 hours PRN  metoclopramide Injectable 10 milliGRAM(s) IV Push every 6 hours PRN  sodium chloride 0.9% lock flush 10 milliLiter(s) IV Push every 1 hour PRN    A/P: 59 year old male with a history of AML  Post Allogeneic PBSCT day +10  Strict I&O, daily weights, prn diuresis   Cardiology following   10/14- Start Tacro, Cellcept and Zarxio  Suptherapeutic tacrolimus level on 10/17. Dose increased to 1.5 mg bid. Follow up repeat level on 10/19     1. Infectious Disease:   acyclovir   Oral Tab/Cap 400 milliGRAM(s) Oral every 8 hours  cefepime   IVPB 2000 milliGRAM(s) IV Intermittent every 8 hours  clotrimazole Lozenge 1 Lozenge Oral five times a day  voriconazole 200 milliGRAM(s) Oral every 12 hours    2. VOD Prophylaxis: Actigall, Glutamine, Heparin (dosed at 100 units / kg / day)     3. GI Prophylaxis:   pantoprazole    Tablet 40 milliGRAM(s) Oral before breakfast    4. Mouthcare - NS / NaHCO3 rinses, Mycelex, Biotene; Skin care     5. GVHD prophylaxis   mycophenolate mofetil 1000 milliGRAM(s) Oral <User Schedule>  tacrolimus 1.5 milliGRAM(s) Oral two times a day    6. Transfuse & replete electrolytes prn   magnesium oxide 400mg po TID     7. IV hydration, daily weights, strict I&O, prn diuresis     8. PO intake as tolerated, nutrition follow up as needed, MVI, folic acid     9. Antiemetics, anti-diarrhea medications:   metoclopramide Injectable 10 milliGRAM(s) IV Push every 6 hours PRN  ondansetron Injectable 8 milliGRAM(s) IV Push every 8 hours  aprepitant 80 milliGRAM(s) Oral every 24 hours    10. OOB as tolerated, physical therapy consult if needed     11. Monitor coags / fibrinogen 2x week, vitamin K as needed     12. Monitor closely for clinical changes, monitor for fevers     13. Emotional support provided, plan of care discussed and questions addressed     14. Patient education done regarding plan of care, restrictions and discharge planning     15. Continue regular social work input     I have written the above note for Dr. Gaytan who performed service with me in the room.   Zulma Chow NP-C ([phone])    I have seen and examined patient with NP, I agree with above note as scribed. 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    S: Patient seen and examined with [facility]PC Transplant Team:   No acute complaints     Denies mouth / tongue / throat pain, dyspnea, cough, nausea, vomiting, diarrhea, abdominal pain     O: Vitals:   Vital Signs Last 24 Hrs  T(C): 36.2 (19 Oct 2019 05:35), Max: 36.8 (18 Oct 2019 17:48)  T(F): 97.2 (19 Oct 2019 05:35), Max: 98.2 (18 Oct 2019 17:48)  HR: 83 (19 Oct 2019 05:35) (83 - 99)  BP: 126/80 (19 Oct 2019 05:35) (106/74 - 128/77)  BP(mean): 18 (18 Oct 2019 17:48) (18 - 18)  RR: 18 (19 Oct 2019 05:35) (18 - 18)  SpO2: 98% (19 Oct 2019 05:35) (97% - 100%)    Admit weight: 81.5 kg   Daily Weight in k.5 (18 Oct 2019 09:27)  Today's weight: 78.7 kg     Intake / Output:   10-18 @ 07:01  -  10-19 @ 07:00  --------------------------------------------------------  IN: 1824.1 mL / OUT: 2000 mL / NET: -175.9 mL    PE:   Oropharynx: no ulceration or erythema  Oral Mucositis:   -                                             Grade: -  CVS: reg S1 S2  Lungs: CTA bilaterally   Abdomen: soft, + normoactive BS, NT, ND  Extremities: No C/C/E. Toe amputation  Gastric Mucositis:     -                                             Grade: -  Intestinal Mucositis:      -                                        Grade: -  Skin: no rash  TLC:  C/D/I  Neuro: Alert and oriented x 3  Pain: none     Labs:   CBC Full  -  ( 19 Oct 2019 07:01 )  WBC Count : <0.10 K/uL  Hemoglobin : 7.6 g/dL  Hematocrit : 22.3 %  Platelet Count - Automated : 12 K/uL  Mean Cell Volume : 91.0 fl  Mean Cell Hemoglobin : 31.0 pg  Mean Cell Hemoglobin Concentration : 34.1 gm/dL  Auto Neutrophil # : x  Auto Lymphocyte # : x  Auto Monocyte # : x  Auto Eosinophil # : x  Auto Basophil # : x  Auto Neutrophil % : x  Auto Lymphocyte % : x  Auto Monocyte % : x  Auto Eosinophil % : x  Auto Basophil % : x                          7.6    <0.10 )-----------( 12       ( 19 Oct 2019 07:01 )             22.3     10-19    141  |  105  |  17  ----------------------------<  101<H>  4.0   |  23  |  0.80    Ca    9.3      19 Oct 2019 07:01  Phos  3.2     10-19  Mg     1.7     10-19    TPro  5.9<L>  /  Alb  3.5  /  TBili  0.6  /  DBili  x   /  AST  11  /  ALT  14  /  AlkPhos  62  10-19      LIVER FUNCTIONS - ( 19 Oct 2019 07:01 )  Alb: 3.5 g/dL / Pro: 5.9 g/dL / ALK PHOS: 62 U/L / ALT: 14 U/L / AST: 11 U/L / GGT: x           Lactate Dehydrogenase, Serum: 112 U/L (10-19 @ 07:01)    Cultures:   Culture - Urine (10.13.19 @ 04:27)    Specimen Source: .Urine    Culture Results:   No growth    Radiology:   < from: Xray Chest 1 View- PORTABLE-Routine (10.12.19 @ 09:00) >  IMPRESSION: Clear lungs.    Meds:   Antimicrobials:   acyclovir   Oral Tab/Cap 400 milliGRAM(s) Oral every 8 hours  cefepime   IVPB 2000 milliGRAM(s) IV Intermittent every 8 hours  clotrimazole Lozenge 1 Lozenge Oral five times a day  voriconazole 200 milliGRAM(s) Oral every 12 hours    Heme / Onc:   heparin  Infusion 339 Unit(s)/Hr IV Continuous <Continuous>    GI:  docusate sodium 100 milliGRAM(s) Oral three times a day  pantoprazole    Tablet 40 milliGRAM(s) Oral before breakfast  senna 2 Tablet(s) Oral at bedtime  sodium bicarbonate Mouth Rinse 10 milliLiter(s) Swish and Spit five times a day  ursodiol Capsule 300 milliGRAM(s) Oral two times a day with meals    Cardiovascular:   enalapril 2.5 milliGRAM(s) Oral daily  metoprolol succinate ER 12.5 milliGRAM(s) Oral two times a day  tamsulosin 0.4 milliGRAM(s) Oral at bedtime    Immunologic:   filgrastim-sndz Injectable 480 MICROGram(s) SubCutaneous daily  immune   globulin 10% (GAMMAGARD) IVPB 30 Gram(s) IV Intermittent <User Schedule>  mycophenolate mofetil 1000 milliGRAM(s) Oral <User Schedule>  tacrolimus 1.5 milliGRAM(s) Oral two times a day    Other medications:   acetaminophen   Tablet .. 650 milliGRAM(s) Oral every 6 hours  acetaminophen   Tablet .. 650 milliGRAM(s) Oral <User Schedule>  ALPRAZolam 0.5 milliGRAM(s) Oral at bedtime  Biotene Dry Mouth Oral Rinse 5 milliLiter(s) Swish and Spit five times a day  chlorhexidine 4% Liquid 1 Application(s) Topical <User Schedule>  diphenhydrAMINE   Injectable 25 milliGRAM(s) IV Push <User Schedule>  finasteride 5 milliGRAM(s) Oral daily  folic acid 1 milliGRAM(s) Oral daily  lidocaine/prilocaine Cream 1 Application(s) Topical daily  magnesium oxide 400 milliGRAM(s) Oral three times a day with meals  multivitamin 1 Tablet(s) Oral daily  sertraline 50 milliGRAM(s) Oral at bedtime  sodium chloride 0.9%. 1000 milliLiter(s) IV Continuous <Continuous>    PRN:   acetaminophen   Tablet .. 650 milliGRAM(s) Oral every 6 hours PRN  acetaminophen  IVPB .. 1000 milliGRAM(s) IV Intermittent once PRN  diphenhydrAMINE   Injectable 25 milliGRAM(s) IV Push every 4 hours PRN  metoclopramide Injectable 10 milliGRAM(s) IV Push every 6 hours PRN  sodium chloride 0.9% lock flush 10 milliLiter(s) IV Push every 1 hour PRN    A/P: 59 year old male with a history of AML  Post Allogeneic PBSCT day +10  Strict I&O, daily weights, prn diuresis   Cardiology following   10/14- Start Tacro, Cellcept and Zarxio  Suptherapeutic tacrolimus level on 10/17. Dose increased to 1.5 mg bid. Level 4.3 on 10/19, continue same dose      1. Infectious Disease:   acyclovir   Oral Tab/Cap 400 milliGRAM(s) Oral every 8 hours  cefepime   IVPB 2000 milliGRAM(s) IV Intermittent every 8 hours  clotrimazole Lozenge 1 Lozenge Oral five times a day  voriconazole 200 milliGRAM(s) Oral every 12 hours    2. VOD Prophylaxis: Actigall, Glutamine, Heparin (dosed at 100 units / kg / day)     3. GI Prophylaxis:   pantoprazole    Tablet 40 milliGRAM(s) Oral before breakfast    4. Mouthcare - NS / NaHCO3 rinses, Mycelex, Biotene; Skin care     5. GVHD prophylaxis   mycophenolate mofetil 1000 milliGRAM(s) Oral <User Schedule>  tacrolimus 1.5 milliGRAM(s) Oral two times a day    6. Transfuse & replete electrolytes prn   magnesium oxide 400mg po TID     7. IV hydration, daily weights, strict I&O, prn diuresis     8. PO intake as tolerated, nutrition follow up as needed, MVI, folic acid     9. Antiemetics, anti-diarrhea medications:   metoclopramide Injectable 10 milliGRAM(s) IV Push every 6 hours PRN  ondansetron Injectable 8 milliGRAM(s) IV Push every 8 hours  aprepitant 80 milliGRAM(s) Oral every 24 hours    10. OOB as tolerated, physical therapy consult if needed     11. Monitor coags / fibrinogen 2x week, vitamin K as needed     12. Monitor closely for clinical changes, monitor for fevers     13. Emotional support provided, plan of care discussed and questions addressed     14. Patient education done regarding plan of care, restrictions and discharge planning     15. Continue regular social work input     I have written the above note for Dr. Gaytan who performed service with me in the room.   Zulma Chow NP-C ([phone])    I have seen and examined patient with NP, I agree with above note as scribed.

## 2019-10-19 NOTE — PROGRESS NOTE ADULT - ATTENDING COMMENTS
59 year old male, with previously diagnosed acute myeloid leukemia. s/p induction chemotherapy with Daunorubicin/Cytarabine and HIDAC consolidation chemotherapy, now admitted for Haplo-identical stem cell transplant from son. Conditioning regimen of Fludarabine, Cytoxan, and TBI. Other history includes non-ischemic cardiomyopathy (recent EF 25% 9/19/19), COLLEEN, DVT/PE, HTN, Factor V Leiden and amputation of right toe due to osteomyelitis.  Day + 9.  Febrile likely due to haplostorm,  s/p CTX 2/2.  Follow temps if persist can dose 1 mg/kg of solumedrol.  Blood/urine cultures from 10/10 negative.  Continue cefepime/voriconazole for now along with acyclovir prophylaxis.  If continues to spike, can consider adding IV vanco.  Strict I&O, daily weights, prn diuresis-   Renal insufficiency- monitor daily creatinine, normal today.   Started  Zarxio on day +5,  MMF and Tacrolimus. Check Tacrolimus levels on Monday and Thursday.   Anxiolytics, antinausea, antidiarrhea medications as needed   Nutritional support.  Hx non-ischemic CM, followed by Dr. Luke Lyn, last Echo 9/19 (EF 25%).  Monitor for fluid overload.  VOD prophylaxis - low dose heparin gtt (dosed at 100 units / kg / day), glutamine supplementation, Actigall BID   GI prophylaxis - Protonix po QD.  Transfusion / electrolyte support  Aggressive mouth care and skin care as per protocol. 59 year old male, with previously diagnosed acute myeloid leukemia. s/p induction chemotherapy with Daunorubicin/Cytarabine and HIDAC consolidation chemotherapy, now admitted for Haplo-identical stem cell transplant from son. Conditioning regimen of Fludarabine, Cytoxan, and TBI. Other history includes non-ischemic cardiomyopathy (recent EF 25% 9/19/19), COLLEEN, DVT/PE, HTN, Factor V Leiden and amputation of right toe due to osteomyelitis.  Day + 10.  Febrile likely due to haplostorm,  s/p CTX 2/2.  Follow temps if persist can dose 1 mg/kg of solumedrol.  Blood/urine cultures from 10/10 negative.  Continue cefepime/voriconazole for now along with acyclovir prophylaxis.  If continues to spike, can consider adding IV vanco.  Strict I&O, daily weights, prn diuresis-   Renal insufficiency- monitor daily creatinine, normal today.   Started  Zarxio on day +5,  MMF and Tacrolimus. Check Tacrolimus levels on Monday and Thursday.   Anxiolytics, antinausea, antidiarrhea medications as needed   Nutritional support.  Hx non-ischemic CM, followed by Dr. Luke Lyn, last Echo 9/19 (EF 25%).  Monitor for fluid overload.  VOD prophylaxis - low dose heparin gtt (dosed at 100 units / kg / day), glutamine supplementation, Actigall BID   GI prophylaxis - Protonix po QD.  Transfusion / electrolyte support  Aggressive mouth care and skin care as per protocol.

## 2019-10-19 NOTE — PROGRESS NOTE ADULT - SUBJECTIVE AND OBJECTIVE BOX
[facility]    Ede Zamudio, Riki, Edgar, Gem, Jose, Tabitha      [phone]    CHIEF COMPLAINT: Patient is a 59y old  Male who presents with a chief complaint of Allogenic stem cell transplant (19 Oct 2019 08:12)      Follow Up: nicm, bm tx    Interim history: The patient reports no new symptoms.  Denies chest discomfort and shortness of breath.  No abdominal pain.  No new neurologic symptoms.      MEDICATIONS  (STANDING):  acetaminophen   Tablet .. 650 milliGRAM(s) Oral every 6 hours  acetaminophen   Tablet .. 650 milliGRAM(s) Oral <User Schedule>  acyclovir   Oral Tab/Cap 400 milliGRAM(s) Oral every 8 hours  ALPRAZolam 0.5 milliGRAM(s) Oral at bedtime  Biotene Dry Mouth Oral Rinse 5 milliLiter(s) Swish and Spit five times a day  cefepime   IVPB 2000 milliGRAM(s) IV Intermittent every 8 hours  chlorhexidine 4% Liquid 1 Application(s) Topical <User Schedule>  clotrimazole Lozenge 1 Lozenge Oral five times a day  diphenhydrAMINE   Injectable 25 milliGRAM(s) IV Push <User Schedule>  docusate sodium 100 milliGRAM(s) Oral three times a day  enalapril 2.5 milliGRAM(s) Oral daily  filgrastim-sndz Injectable 480 MICROGram(s) SubCutaneous daily  finasteride 5 milliGRAM(s) Oral daily  folic acid 1 milliGRAM(s) Oral daily  heparin  Infusion 339 Unit(s)/Hr (3.39 mL/Hr) IV Continuous <Continuous>  immune   globulin 10% (GAMMAGARD) IVPB 30 Gram(s) IV Intermittent <User Schedule>  lidocaine/prilocaine Cream 1 Application(s) Topical daily  magnesium oxide 400 milliGRAM(s) Oral three times a day with meals  metoprolol succinate ER 12.5 milliGRAM(s) Oral two times a day  multivitamin 1 Tablet(s) Oral daily  mycophenolate mofetil 1000 milliGRAM(s) Oral <User Schedule>  pantoprazole    Tablet 40 milliGRAM(s) Oral before breakfast  senna 2 Tablet(s) Oral at bedtime  sertraline 50 milliGRAM(s) Oral at bedtime  sodium bicarbonate Mouth Rinse 10 milliLiter(s) Swish and Spit five times a day  sodium chloride 0.9%. 1000 milliLiter(s) (20 mL/Hr) IV Continuous <Continuous>  tacrolimus 1.5 milliGRAM(s) Oral two times a day  tamsulosin 0.4 milliGRAM(s) Oral at bedtime  ursodiol Capsule 300 milliGRAM(s) Oral two times a day with meals  voriconazole 200 milliGRAM(s) Oral every 12 hours    MEDICATIONS  (PRN):  acetaminophen   Tablet .. 650 milliGRAM(s) Oral every 6 hours PRN Temp greater or equal to 38C (100.4F), Mild Pain (1 - 3)  acetaminophen  IVPB .. 1000 milliGRAM(s) IV Intermittent once PRN Temp greater or equal to 38.5C (101.3F)  diphenhydrAMINE   Injectable 25 milliGRAM(s) IV Push every 4 hours PRN Allergy symptoms  metoclopramide Injectable 10 milliGRAM(s) IV Push every 6 hours PRN Nausea and/or Vomiting  sodium chloride 0.9% lock flush 10 milliLiter(s) IV Push every 1 hour PRN Pre/post blood products, medications, blood draw, and to maintain line patency      REVIEW OF SYSTEMS:  eye, ent, GI, , allergic, dermatologic, musculoskeletal and neurologic are negative except as described above    Vital Signs Last 24 Hrs  T(C): 36.2 (19 Oct 2019 05:35), Max: 36.8 (18 Oct 2019 17:48)  T(F): 97.2 (19 Oct 2019 05:35), Max: 98.2 (18 Oct 2019 17:48)  HR: 83 (19 Oct 2019 05:35) (83 - 99)  BP: 126/80 (19 Oct 2019 05:35) (106/74 - 128/77)  BP(mean): 18 (18 Oct 2019 17:48) (18 - 18)  RR: 18 (19 Oct 2019 05:35) (18 - 18)  SpO2: 98% (19 Oct 2019 05:35) (97% - 100%)    I&O's Summary    18 Oct 2019 07:01  -  19 Oct 2019 07:00  --------------------------------------------------------  IN: 1824.1 mL / OUT: 2000 mL / NET: -175.9 mL    19 Oct 2019 07:01  -  19 Oct 2019 08:43  --------------------------------------------------------  IN: 105.6 mL / OUT: 0 mL / NET: 105.6 mL        Telemetry past 24h:    PHYSICAL EXAM:    Constitutional: well-nourished, well-developed, NAD   HEENT:  MMM, sclerae anicteric, conjunctivae clear, no oral cyanosis.  Pulmonary: Non-labored, breath sounds are clear bilaterally, No wheezing, rales or rhonchi  Cardiovascular: Regular, S1 and S2.  No murmur.  No rubs, gallops or clicks  Gastrointestinal: Bowel Sounds present, soft, nontender.   Lymph: No peripheral edema.   Neurological: Alert, no focal deficits  Skin: No rashes.  Psych:  Mood & affect appropriate    LABS: All Labs Reviewed:                        7.6    <0.10 )-----------( 12       ( 19 Oct 2019 07:01 )             22.3                         7.3    <0.10 )-----------( 24       ( 18 Oct 2019 07:37 )             21.4                         7.7    <0.10 )-----------( 7        ( 17 Oct 2019 06:51 )             22.4     19 Oct 2019 07:01    141    |  105    |  17     ----------------------------<  101    4.0     |  23     |  0.80   18 Oct 2019 07:05    142    |  107    |  18     ----------------------------<  99     4.1     |  24     |  0.95   17 Oct 2019 06:51    138    |  105    |  16     ----------------------------<  94     4.1     |  22     |  0.82     Ca    9.3        19 Oct 2019 07:01  Ca    8.9        18 Oct 2019 07:05  Ca    8.9        17 Oct 2019 06:51  Phos  3.2       19 Oct 2019 07:01  Phos  3.0       18 Oct 2019 07:05  Phos  2.6       17 Oct 2019 06:51  Mg     1.7       19 Oct 2019 07:01  Mg     1.6       18 Oct 2019 07:05  Mg     1.7       17 Oct 2019 06:51    TPro  5.9    /  Alb  3.5    /  TBili  0.6    /  DBili  x      /  AST  11     /  ALT  14     /  AlkPhos  62     19 Oct 2019 07:01  TPro  5.7    /  Alb  3.2    /  TBili  0.4    /  DBili  x      /  AST  12     /  ALT  13     /  AlkPhos  60     18 Oct 2019 07:05  TPro  5.7    /  Alb  3.2    /  TBili  0.5    /  DBili  x      /  AST  14     /  ALT  12     /  AlkPhos  62     17 Oct 2019 06:51          Blood Culture:         RADIOLOGY:    EKG:    Echo:

## 2019-10-20 LAB
ALBUMIN SERPL ELPH-MCNC: 3.3 G/DL — SIGNIFICANT CHANGE UP (ref 3.3–5)
ALP SERPL-CCNC: 62 U/L — SIGNIFICANT CHANGE UP (ref 40–120)
ALT FLD-CCNC: 10 U/L — SIGNIFICANT CHANGE UP (ref 10–45)
ANION GAP SERPL CALC-SCNC: 13 MMOL/L — SIGNIFICANT CHANGE UP (ref 5–17)
AST SERPL-CCNC: 8 U/L — LOW (ref 10–40)
BILIRUB SERPL-MCNC: 0.4 MG/DL — SIGNIFICANT CHANGE UP (ref 0.2–1.2)
BUN SERPL-MCNC: 18 MG/DL — SIGNIFICANT CHANGE UP (ref 7–23)
CALCIUM SERPL-MCNC: 8.9 MG/DL — SIGNIFICANT CHANGE UP (ref 8.4–10.5)
CHLORIDE SERPL-SCNC: 105 MMOL/L — SIGNIFICANT CHANGE UP (ref 96–108)
CO2 SERPL-SCNC: 23 MMOL/L — SIGNIFICANT CHANGE UP (ref 22–31)
CREAT SERPL-MCNC: 0.8 MG/DL — SIGNIFICANT CHANGE UP (ref 0.5–1.3)
GLUCOSE SERPL-MCNC: 103 MG/DL — HIGH (ref 70–99)
HCT VFR BLD CALC: 20.4 % — CRITICAL LOW (ref 39–50)
HGB BLD-MCNC: 6.8 G/DL — CRITICAL LOW (ref 13–17)
LDH SERPL L TO P-CCNC: 110 U/L — SIGNIFICANT CHANGE UP (ref 50–242)
MAGNESIUM SERPL-MCNC: 1.5 MG/DL — LOW (ref 1.6–2.6)
MCHC RBC-ENTMCNC: 30.2 PG — SIGNIFICANT CHANGE UP (ref 27–34)
MCHC RBC-ENTMCNC: 33.3 GM/DL — SIGNIFICANT CHANGE UP (ref 32–36)
MCV RBC AUTO: 90.7 FL — SIGNIFICANT CHANGE UP (ref 80–100)
NRBC # BLD: 0 /100 WBCS — SIGNIFICANT CHANGE UP (ref 0–0)
PHOSPHATE SERPL-MCNC: 3.3 MG/DL — SIGNIFICANT CHANGE UP (ref 2.5–4.5)
PLATELET # BLD AUTO: 8 K/UL — CRITICAL LOW (ref 150–400)
POTASSIUM SERPL-MCNC: 4.3 MMOL/L — SIGNIFICANT CHANGE UP (ref 3.5–5.3)
POTASSIUM SERPL-SCNC: 4.3 MMOL/L — SIGNIFICANT CHANGE UP (ref 3.5–5.3)
PROT SERPL-MCNC: 5.8 G/DL — LOW (ref 6–8.3)
RBC # BLD: 2.25 M/UL — LOW (ref 4.2–5.8)
RBC # FLD: 17 % — HIGH (ref 10.3–14.5)
SODIUM SERPL-SCNC: 141 MMOL/L — SIGNIFICANT CHANGE UP (ref 135–145)
WBC # BLD: <0.1 K/UL — CRITICAL LOW (ref 3.8–10.5)
WBC # FLD AUTO: <0.1 K/UL — CRITICAL LOW (ref 3.8–10.5)

## 2019-10-20 PROCEDURE — 99232 SBSQ HOSP IP/OBS MODERATE 35: CPT

## 2019-10-20 PROCEDURE — 99291 CRITICAL CARE FIRST HOUR: CPT

## 2019-10-20 RX ORDER — MAGNESIUM SULFATE 500 MG/ML
2 VIAL (ML) INJECTION ONCE
Refills: 0 | Status: COMPLETED | OUTPATIENT
Start: 2019-10-20 | End: 2019-10-20

## 2019-10-20 RX ADMIN — Medication 480 MICROGRAM(S): at 17:22

## 2019-10-20 RX ADMIN — CHLORHEXIDINE GLUCONATE 1 APPLICATION(S): 213 SOLUTION TOPICAL at 10:07

## 2019-10-20 RX ADMIN — Medication 5 MILLILITER(S): at 19:52

## 2019-10-20 RX ADMIN — URSODIOL 300 MILLIGRAM(S): 250 TABLET, FILM COATED ORAL at 08:33

## 2019-10-20 RX ADMIN — TACROLIMUS 1.5 MILLIGRAM(S): 5 CAPSULE ORAL at 05:05

## 2019-10-20 RX ADMIN — Medication 10 MILLILITER(S): at 17:21

## 2019-10-20 RX ADMIN — PANTOPRAZOLE SODIUM 40 MILLIGRAM(S): 20 TABLET, DELAYED RELEASE ORAL at 06:38

## 2019-10-20 RX ADMIN — Medication 0.5 MILLIGRAM(S): at 21:44

## 2019-10-20 RX ADMIN — Medication 400 MILLIGRAM(S): at 13:28

## 2019-10-20 RX ADMIN — Medication 10 MILLILITER(S): at 08:33

## 2019-10-20 RX ADMIN — Medication 10 MILLILITER(S): at 00:52

## 2019-10-20 RX ADMIN — MAGNESIUM OXIDE 400 MG ORAL TABLET 400 MILLIGRAM(S): 241.3 TABLET ORAL at 17:20

## 2019-10-20 RX ADMIN — TAMSULOSIN HYDROCHLORIDE 0.4 MILLIGRAM(S): 0.4 CAPSULE ORAL at 21:44

## 2019-10-20 RX ADMIN — Medication 25 MILLIGRAM(S): at 10:05

## 2019-10-20 RX ADMIN — MYCOPHENOLATE MOFETIL 1000 MILLIGRAM(S): 250 CAPSULE ORAL at 08:43

## 2019-10-20 RX ADMIN — Medication 12.5 MILLIGRAM(S): at 05:05

## 2019-10-20 RX ADMIN — MAGNESIUM OXIDE 400 MG ORAL TABLET 400 MILLIGRAM(S): 241.3 TABLET ORAL at 08:33

## 2019-10-20 RX ADMIN — Medication 10 MILLILITER(S): at 12:32

## 2019-10-20 RX ADMIN — Medication 10 MILLILITER(S): at 19:52

## 2019-10-20 RX ADMIN — Medication 1 LOZENGE: at 19:52

## 2019-10-20 RX ADMIN — Medication 2.5 MILLIGRAM(S): at 05:04

## 2019-10-20 RX ADMIN — Medication 1 LOZENGE: at 08:33

## 2019-10-20 RX ADMIN — Medication 50 GRAM(S): at 08:32

## 2019-10-20 RX ADMIN — Medication 1 LOZENGE: at 17:21

## 2019-10-20 RX ADMIN — Medication 1 TABLET(S): at 12:32

## 2019-10-20 RX ADMIN — Medication 1 LOZENGE: at 00:52

## 2019-10-20 RX ADMIN — Medication 1 MILLIGRAM(S): at 12:32

## 2019-10-20 RX ADMIN — Medication 5 MILLILITER(S): at 00:52

## 2019-10-20 RX ADMIN — TACROLIMUS 1.5 MILLIGRAM(S): 5 CAPSULE ORAL at 17:21

## 2019-10-20 RX ADMIN — FINASTERIDE 5 MILLIGRAM(S): 5 TABLET, FILM COATED ORAL at 12:32

## 2019-10-20 RX ADMIN — VORICONAZOLE 200 MILLIGRAM(S): 10 INJECTION, POWDER, LYOPHILIZED, FOR SOLUTION INTRAVENOUS at 05:04

## 2019-10-20 RX ADMIN — Medication 5 MILLILITER(S): at 17:21

## 2019-10-20 RX ADMIN — Medication 400 MILLIGRAM(S): at 05:04

## 2019-10-20 RX ADMIN — VORICONAZOLE 200 MILLIGRAM(S): 10 INJECTION, POWDER, LYOPHILIZED, FOR SOLUTION INTRAVENOUS at 17:21

## 2019-10-20 RX ADMIN — Medication 1 LOZENGE: at 12:32

## 2019-10-20 RX ADMIN — MYCOPHENOLATE MOFETIL 1000 MILLIGRAM(S): 250 CAPSULE ORAL at 21:45

## 2019-10-20 RX ADMIN — CEFEPIME 100 MILLIGRAM(S): 1 INJECTION, POWDER, FOR SOLUTION INTRAMUSCULAR; INTRAVENOUS at 13:28

## 2019-10-20 RX ADMIN — CEFEPIME 100 MILLIGRAM(S): 1 INJECTION, POWDER, FOR SOLUTION INTRAMUSCULAR; INTRAVENOUS at 05:10

## 2019-10-20 RX ADMIN — URSODIOL 300 MILLIGRAM(S): 250 TABLET, FILM COATED ORAL at 17:20

## 2019-10-20 RX ADMIN — SERTRALINE 50 MILLIGRAM(S): 25 TABLET, FILM COATED ORAL at 21:44

## 2019-10-20 RX ADMIN — MAGNESIUM OXIDE 400 MG ORAL TABLET 400 MILLIGRAM(S): 241.3 TABLET ORAL at 12:32

## 2019-10-20 RX ADMIN — Medication 5 MILLILITER(S): at 08:32

## 2019-10-20 RX ADMIN — Medication 5 MILLILITER(S): at 12:32

## 2019-10-20 RX ADMIN — Medication 400 MILLIGRAM(S): at 21:44

## 2019-10-20 RX ADMIN — MYCOPHENOLATE MOFETIL 1000 MILLIGRAM(S): 250 CAPSULE ORAL at 13:28

## 2019-10-20 RX ADMIN — Medication 650 MILLIGRAM(S): at 10:05

## 2019-10-20 RX ADMIN — CEFEPIME 100 MILLIGRAM(S): 1 INJECTION, POWDER, FOR SOLUTION INTRAMUSCULAR; INTRAVENOUS at 21:44

## 2019-10-20 RX ADMIN — Medication 12.5 MILLIGRAM(S): at 17:20

## 2019-10-20 NOTE — PROGRESS NOTE ADULT - ATTENDING COMMENTS
59 year old male, with previously diagnosed acute myeloid leukemia. s/p induction chemotherapy with Daunorubicin/Cytarabine and HIDAC consolidation chemotherapy, now admitted for Haplo-identical stem cell transplant from son. Conditioning regimen of Fludarabine, Cytoxan, and TBI. Other history includes non-ischemic cardiomyopathy (recent EF 25% 9/19/19), COLLEEN, DVT/PE, HTN, Factor V Leiden and amputation of right toe due to osteomyelitis.  Day + 10.  Febrile likely due to haplostorm,  s/p CTX 2/2.  Follow temps if persist can dose 1 mg/kg of solumedrol.  Blood/urine cultures from 10/10 negative.  Continue cefepime/voriconazole for now along with acyclovir prophylaxis.  If continues to spike, can consider adding IV vanco.  Strict I&O, daily weights, prn diuresis-   Renal insufficiency- monitor daily creatinine, normal today.   Started  Zarxio on day +5,  MMF and Tacrolimus. Check Tacrolimus levels on Monday and Thursday.   Anxiolytics, antinausea, antidiarrhea medications as needed   Nutritional support.  Hx non-ischemic CM, followed by Dr. Luke Lyn, last Echo 9/19 (EF 25%).  Monitor for fluid overload.  VOD prophylaxis - low dose heparin gtt (dosed at 100 units / kg / day), glutamine supplementation, Actigall BID   GI prophylaxis - Protonix po QD.  Transfusion / electrolyte support  Aggressive mouth care and skin care as per protocol. 59 year old male, with previously diagnosed acute myeloid leukemia. s/p induction chemotherapy with Daunorubicin/Cytarabine and HIDAC consolidation chemotherapy, now admitted for Haplo-identical stem cell transplant from son. Conditioning regimen of Fludarabine, Cytoxan, and TBI. Other history includes non-ischemic cardiomyopathy (recent EF 25% 9/19/19), COLLEEN, DVT/PE, HTN, Factor V Leiden and amputation of right toe due to osteomyelitis.  Day + 11.  Febrile likely due to haplostorm,  s/p CTX 2/2.  Follow temps if persist can dose 1 mg/kg of solumedrol.  Blood/urine cultures from 10/10 negative.  Continue cefepime/voriconazole for now along with acyclovir prophylaxis.  If continues to spike, can consider adding IV vanco.  Strict I&O, daily weights, prn diuresis-   Renal insufficiency- monitor daily creatinine, normal today.   Started  Zarxio on day +5,  MMF and Tacrolimus. Check Tacrolimus levels on Monday and Thursday.   Anxiolytics, antinausea, antidiarrhea medications as needed   Nutritional support.  Hx non-ischemic CM, followed by Dr. Luke Lyn, last Echo 9/19 (EF 25%).  Monitor for fluid overload.  VOD prophylaxis - low dose heparin gtt (dosed at 100 units / kg / day), glutamine supplementation, Actigall BID   GI prophylaxis - Protonix po QD.  Transfusion / electrolyte support  Aggressive mouth care and skin care as per protocol.

## 2019-10-20 NOTE — PROGRESS NOTE ADULT - ASSESSMENT
59 year old male, with previously diagnosed acute myeloid leukemia. s/p induction chemotherapy with Daunorubicin/Cytarabine and HIDAC consolidation chemotherapy, now admitted for Haplo-identical stem cell transplant from son:    - No evidence of volume overload at present.  He is on fluids and has been getting IV Lasix prn to maintain net negative.  His recent CXR is clear.  - Continue to maintain strict I's and O's, and to monitor for signs of volume overload, which he is at risk for.  - No evidence of acute ischemia  - BP is overall stable, and has been higher than usual  - Continue ACEI  and BB at current doses as tolerated by BP. He has been able to get them over the last 72 hours.  - On heparin gtt for hx of dvt/pe and hypercoagulable state, and adjust rate per protocol. Monitor platelet count which is now 8.  No bleeding, though is clearly at risk.  - Monitor and replete lytes  - To follow while admitted

## 2019-10-20 NOTE — PROGRESS NOTE ADULT - SUBJECTIVE AND OBJECTIVE BOX
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    S: Patient seen and examined with HPC Transplant Team:   No acute complaints     Denies mouth / tongue / throat pain, dyspnea, cough, nausea, vomiting, diarrhea, abdominal pain     O: Vitals:   Vital Signs Last 24 Hrs  T(C): 36.6 (20 Oct 2019 05:00), Max: 36.9 (19 Oct 2019 13:44)  T(F): 97.9 (20 Oct 2019 05:00), Max: 98.4 (19 Oct 2019 13:44)  HR: 91 (20 Oct 2019 05:00) (71 - 101)  BP: 113/70 (20 Oct 2019 05:00) (112/75 - 142/85)  BP(mean): --  RR: 18 (20 Oct 2019 05:00) (18 - 18)  SpO2: 96% (20 Oct 2019 05:00) (96% - 100%)    Admit weight: 81.5 kg   Daily Weight in k.7 (19 Oct 2019 10:38)  Today's weight: pending     Intake / Output:   10-19 @ 07:  -  10-20 @ 07:00  --------------------------------------------------------  IN: 1418.7 mL / OUT: 700 mL / NET: 718.7 mL    PE:   Oropharynx: no ulceration or erythema  Oral Mucositis:   -                                             Grade: -  CVS: reg S1 S2  Lungs: CTA bilaterally   Abdomen: soft, + normoactive BS, NT, ND  Extremities: No C/C/E. Toe amputation  Gastric Mucositis:     -                                             Grade: -  Intestinal Mucositis:      -                                        Grade: -  Skin: no rash  TLC:  C/D/I  Neuro: Alert and oriented x 3  Pain: none     Labs:   CBC Full  -  ( 20 Oct 2019 07:05 )  WBC Count : <0.10 K/uL  Hemoglobin : 6.8 g/dL  Hematocrit : 20.4 %  Platelet Count - Automated : 8 K/uL  Mean Cell Volume : 90.7 fl  Mean Cell Hemoglobin : 30.2 pg  Mean Cell Hemoglobin Concentration : 33.3 gm/dL  Auto Neutrophil # : x  Auto Lymphocyte # : x  Auto Monocyte # : x  Auto Eosinophil # : x  Auto Basophil # : x  Auto Neutrophil % : x  Auto Lymphocyte % : x  Auto Monocyte % : x  Auto Eosinophil % : x  Auto Basophil % : x                          6.8    <0.10 )-----------( 8        ( 20 Oct 2019 07:05 )             20.4     10-20    141  |  105  |  18  ----------------------------<  103<H>  4.3   |  23  |  0.80    Ca    8.9      20 Oct 2019 07:05  Phos  3.3     10-20  Mg     1.5     10-20    TPro  5.8<L>  /  Alb  3.3  /  TBili  0.4  /  DBili  x   /  AST  8<L>  /  ALT  10  /  AlkPhos  62  10-20      LIVER FUNCTIONS - ( 20 Oct 2019 07:05 )  Alb: 3.3 g/dL / Pro: 5.8 g/dL / ALK PHOS: 62 U/L / ALT: 10 U/L / AST: 8 U/L / GGT: x           Lactate Dehydrogenase, Serum: 110 U/L (10-20 @ 07:05)     10-19 @ 08:53  Tacrolimus 4.3                Cyclosporine --    Cultures:   Culture - Urine (10.13.19 @ 04:27)    Specimen Source: .Urine    Culture Results:   No growth    Radiology:   < from: Xray Chest 1 View- PORTABLE-Routine (10.12.19 @ 09:00) >  IMPRESSION: Clear lungs.    Meds:   Antimicrobials:   acyclovir   Oral Tab/Cap 400 milliGRAM(s) Oral every 8 hours  cefepime   IVPB 2000 milliGRAM(s) IV Intermittent every 8 hours  clotrimazole Lozenge 1 Lozenge Oral five times a day  voriconazole 200 milliGRAM(s) Oral every 12 hours    Heme / Onc:   heparin  Infusion 339 Unit(s)/Hr IV Continuous <Continuous>    GI:  docusate sodium 100 milliGRAM(s) Oral three times a day  pantoprazole    Tablet 40 milliGRAM(s) Oral before breakfast  senna 2 Tablet(s) Oral at bedtime  sodium bicarbonate Mouth Rinse 10 milliLiter(s) Swish and Spit five times a day  ursodiol Capsule 300 milliGRAM(s) Oral two times a day with meals    Cardiovascular:   enalapril 2.5 milliGRAM(s) Oral daily  metoprolol succinate ER 12.5 milliGRAM(s) Oral two times a day  tamsulosin 0.4 milliGRAM(s) Oral at bedtime    Immunologic:   filgrastim-sndz Injectable 480 MICROGram(s) SubCutaneous daily  immune   globulin 10% (GAMMAGARD) IVPB 30 Gram(s) IV Intermittent <User Schedule>  mycophenolate mofetil 1000 milliGRAM(s) Oral <User Schedule>  tacrolimus 1.5 milliGRAM(s) Oral two times a day    Other medications:   acetaminophen   Tablet .. 650 milliGRAM(s) Oral every 6 hours  acetaminophen   Tablet .. 650 milliGRAM(s) Oral <User Schedule>  ALPRAZolam 0.5 milliGRAM(s) Oral at bedtime  Biotene Dry Mouth Oral Rinse 5 milliLiter(s) Swish and Spit five times a day  chlorhexidine 4% Liquid 1 Application(s) Topical <User Schedule>  diphenhydrAMINE   Injectable 25 milliGRAM(s) IV Push <User Schedule>  finasteride 5 milliGRAM(s) Oral daily  folic acid 1 milliGRAM(s) Oral daily  lidocaine/prilocaine Cream 1 Application(s) Topical daily  magnesium oxide 400 milliGRAM(s) Oral three times a day with meals  multivitamin 1 Tablet(s) Oral daily  sertraline 50 milliGRAM(s) Oral at bedtime  sodium chloride 0.9%. 1000 milliLiter(s) IV Continuous <Continuous>    PRN:   acetaminophen   Tablet .. 650 milliGRAM(s) Oral every 6 hours PRN  acetaminophen  IVPB .. 1000 milliGRAM(s) IV Intermittent once PRN  diphenhydrAMINE   Injectable 25 milliGRAM(s) IV Push every 4 hours PRN  metoclopramide Injectable 10 milliGRAM(s) IV Push every 6 hours PRN  ondansetron Injectable 8 milliGRAM(s) IV Push every 8 hours PRN  sodium chloride 0.9% lock flush 10 milliLiter(s) IV Push every 1 hour PRN      A/P: 59 year old male with a history of AML  Post Allogeneic PBSCT day +11   Strict I&O, daily weights, prn diuresis   Cardiology following   10/14- Start Tacro, Cellcept and Zarxio  Suptherapeutic tacrolimus level on 10/17. Dose increased to 1.5 mg bid. Level 4.3 on 10/19, continue same dose      1. Infectious Disease:   acyclovir   Oral Tab/Cap 400 milliGRAM(s) Oral every 8 hours  cefepime   IVPB 2000 milliGRAM(s) IV Intermittent every 8 hours  clotrimazole Lozenge 1 Lozenge Oral five times a day  voriconazole 200 milliGRAM(s) Oral every 12 hours    2. VOD Prophylaxis: Actigall, Glutamine, Heparin (dosed at 100 units / kg / day)     3. GI Prophylaxis:   pantoprazole    Tablet 40 milliGRAM(s) Oral before breakfast    4. Mouthcare - NS / NaHCO3 rinses, Mycelex, Biotene; Skin care     5. GVHD prophylaxis   mycophenolate mofetil 1000 milliGRAM(s) Oral <User Schedule>  tacrolimus 1.5 milliGRAM(s) Oral two times a day    6. Transfuse & replete electrolytes prn   magnesium oxide 400mg po TID     7. IV hydration, daily weights, strict I&O, prn diuresis     8. PO intake as tolerated, nutrition follow up as needed, MVI, folic acid     9. Antiemetics, anti-diarrhea medications:   metoclopramide Injectable 10 milliGRAM(s) IV Push every 6 hours PRN  ondansetron Injectable 8 milliGRAM(s) IV Push every 8 hours  aprepitant 80 milliGRAM(s) Oral every 24 hours    10. OOB as tolerated, physical therapy consult if needed     11. Monitor coags / fibrinogen 2x week, vitamin K as needed     12. Monitor closely for clinical changes, monitor for fevers     13. Emotional support provided, plan of care discussed and questions addressed     14. Patient education done regarding plan of care, restrictions and discharge planning     15. Continue regular social work input     I have written the above note for Dr. Gaytan who performed service with me in the room.   Zulma Chow NP-C ([phone])    I have seen and examined patient with NP, I agree with above note as scribed. 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    S: Patient seen and examined with HPC Transplant Team:   No acute complaints     Denies mouth / tongue / throat pain, dyspnea, cough, nausea, vomiting, diarrhea, abdominal pain     O: Vitals:   Vital Signs Last 24 Hrs  T(C): 36.6 (20 Oct 2019 05:00), Max: 36.9 (19 Oct 2019 13:44)  T(F): 97.9 (20 Oct 2019 05:00), Max: 98.4 (19 Oct 2019 13:44)  HR: 91 (20 Oct 2019 05:00) (71 - 101)  BP: 113/70 (20 Oct 2019 05:00) (112/75 - 142/85)  BP(mean): --  RR: 18 (20 Oct 2019 05:00) (18 - 18)  SpO2: 96% (20 Oct 2019 05:00) (96% - 100%)    Admit weight: 81.5 kg   Daily Weight in k.7 (19 Oct 2019 10:38)  Today's weight: 77.9 kg     Intake / Output:   10-19 @ 07:  -  10-20 @ 07:00  --------------------------------------------------------  IN: 1418.7 mL / OUT: 700 mL / NET: 718.7 mL    PE:   Oropharynx: no ulceration or erythema  Oral Mucositis:   -                                             Grade: -  CVS: reg S1 S2  Lungs: CTA bilaterally   Abdomen: soft, + normoactive BS, NT, ND  Extremities: No C/C/E. Toe amputation  Gastric Mucositis:     -                                             Grade: -  Intestinal Mucositis:      -                                        Grade: -  Skin: no rash  TLC:  C/D/I  Neuro: Alert and oriented x 3  Pain: none     Labs:   CBC Full  -  ( 20 Oct 2019 07:05 )  WBC Count : <0.10 K/uL  Hemoglobin : 6.8 g/dL  Hematocrit : 20.4 %  Platelet Count - Automated : 8 K/uL  Mean Cell Volume : 90.7 fl  Mean Cell Hemoglobin : 30.2 pg  Mean Cell Hemoglobin Concentration : 33.3 gm/dL  Auto Neutrophil # : x  Auto Lymphocyte # : x  Auto Monocyte # : x  Auto Eosinophil # : x  Auto Basophil # : x  Auto Neutrophil % : x  Auto Lymphocyte % : x  Auto Monocyte % : x  Auto Eosinophil % : x  Auto Basophil % : x                          6.8    <0.10 )-----------( 8        ( 20 Oct 2019 07:05 )             20.4     10-20    141  |  105  |  18  ----------------------------<  103<H>  4.3   |  23  |  0.80    Ca    8.9      20 Oct 2019 07:05  Phos  3.3     10-20  Mg     1.5     10-20    TPro  5.8<L>  /  Alb  3.3  /  TBili  0.4  /  DBili  x   /  AST  8<L>  /  ALT  10  /  AlkPhos  62  10-20      LIVER FUNCTIONS - ( 20 Oct 2019 07:05 )  Alb: 3.3 g/dL / Pro: 5.8 g/dL / ALK PHOS: 62 U/L / ALT: 10 U/L / AST: 8 U/L / GGT: x           Lactate Dehydrogenase, Serum: 110 U/L (10-20 @ 07:05)     10-19 @ 08:53  Tacrolimus 4.3                Cyclosporine --    Cultures:   Culture - Urine (10.13.19 @ 04:27)    Specimen Source: .Urine    Culture Results:   No growth    Radiology:   < from: Xray Chest 1 View- PORTABLE-Routine (10.12.19 @ 09:00) >  IMPRESSION: Clear lungs.    Meds:   Antimicrobials:   acyclovir   Oral Tab/Cap 400 milliGRAM(s) Oral every 8 hours  cefepime   IVPB 2000 milliGRAM(s) IV Intermittent every 8 hours  clotrimazole Lozenge 1 Lozenge Oral five times a day  voriconazole 200 milliGRAM(s) Oral every 12 hours    Heme / Onc:   heparin  Infusion 339 Unit(s)/Hr IV Continuous <Continuous>    GI:  docusate sodium 100 milliGRAM(s) Oral three times a day  pantoprazole    Tablet 40 milliGRAM(s) Oral before breakfast  senna 2 Tablet(s) Oral at bedtime  sodium bicarbonate Mouth Rinse 10 milliLiter(s) Swish and Spit five times a day  ursodiol Capsule 300 milliGRAM(s) Oral two times a day with meals    Cardiovascular:   enalapril 2.5 milliGRAM(s) Oral daily  metoprolol succinate ER 12.5 milliGRAM(s) Oral two times a day  tamsulosin 0.4 milliGRAM(s) Oral at bedtime    Immunologic:   filgrastim-sndz Injectable 480 MICROGram(s) SubCutaneous daily  immune   globulin 10% (GAMMAGARD) IVPB 30 Gram(s) IV Intermittent <User Schedule>  mycophenolate mofetil 1000 milliGRAM(s) Oral <User Schedule>  tacrolimus 1.5 milliGRAM(s) Oral two times a day    Other medications:   acetaminophen   Tablet .. 650 milliGRAM(s) Oral every 6 hours  acetaminophen   Tablet .. 650 milliGRAM(s) Oral <User Schedule>  ALPRAZolam 0.5 milliGRAM(s) Oral at bedtime  Biotene Dry Mouth Oral Rinse 5 milliLiter(s) Swish and Spit five times a day  chlorhexidine 4% Liquid 1 Application(s) Topical <User Schedule>  diphenhydrAMINE   Injectable 25 milliGRAM(s) IV Push <User Schedule>  finasteride 5 milliGRAM(s) Oral daily  folic acid 1 milliGRAM(s) Oral daily  lidocaine/prilocaine Cream 1 Application(s) Topical daily  magnesium oxide 400 milliGRAM(s) Oral three times a day with meals  multivitamin 1 Tablet(s) Oral daily  sertraline 50 milliGRAM(s) Oral at bedtime  sodium chloride 0.9%. 1000 milliLiter(s) IV Continuous <Continuous>    PRN:   acetaminophen   Tablet .. 650 milliGRAM(s) Oral every 6 hours PRN  acetaminophen  IVPB .. 1000 milliGRAM(s) IV Intermittent once PRN  diphenhydrAMINE   Injectable 25 milliGRAM(s) IV Push every 4 hours PRN  metoclopramide Injectable 10 milliGRAM(s) IV Push every 6 hours PRN  ondansetron Injectable 8 milliGRAM(s) IV Push every 8 hours PRN  sodium chloride 0.9% lock flush 10 milliLiter(s) IV Push every 1 hour PRN      A/P: 59 year old male with a history of AML  Post Allogeneic PBSCT day +11   Strict I&O, daily weights, prn diuresis   Cardiology following   10/14- Start Tacro, Cellcept and Zarxio  Suptherapeutic tacrolimus level on 10/17. Dose increased to 1.5 mg bid. Level 4.3 on 10/19, continue same dose      1. Infectious Disease:   acyclovir   Oral Tab/Cap 400 milliGRAM(s) Oral every 8 hours  cefepime   IVPB 2000 milliGRAM(s) IV Intermittent every 8 hours  clotrimazole Lozenge 1 Lozenge Oral five times a day  voriconazole 200 milliGRAM(s) Oral every 12 hours    2. VOD Prophylaxis: Actigall, Glutamine, Heparin (dosed at 100 units / kg / day)     3. GI Prophylaxis:   pantoprazole    Tablet 40 milliGRAM(s) Oral before breakfast    4. Mouthcare - NS / NaHCO3 rinses, Mycelex, Biotene; Skin care     5. GVHD prophylaxis   mycophenolate mofetil 1000 milliGRAM(s) Oral <User Schedule>  tacrolimus 1.5 milliGRAM(s) Oral two times a day    6. Transfuse & replete electrolytes prn   magnesium oxide 400mg po TID     7. IV hydration, daily weights, strict I&O, prn diuresis     8. PO intake as tolerated, nutrition follow up as needed, MVI, folic acid     9. Antiemetics, anti-diarrhea medications:   metoclopramide Injectable 10 milliGRAM(s) IV Push every 6 hours PRN  ondansetron Injectable 8 milliGRAM(s) IV Push every 8 hours  aprepitant 80 milliGRAM(s) Oral every 24 hours    10. OOB as tolerated, physical therapy consult if needed     11. Monitor coags / fibrinogen 2x week, vitamin K as needed     12. Monitor closely for clinical changes, monitor for fevers     13. Emotional support provided, plan of care discussed and questions addressed     14. Patient education done regarding plan of care, restrictions and discharge planning     15. Continue regular social work input     I have written the above note for Dr. Gaytan who performed service with me in the room.   Zulma Chow NP-C ([phone])    I have seen and examined patient with NP, I agree with above note as scribed.

## 2019-10-20 NOTE — PROGRESS NOTE ADULT - SUBJECTIVE AND OBJECTIVE BOX
[facility]    Ede Zamudio, Riki, Edgar, Gem, Jose, Tabitha      [phone]    CHIEF COMPLAINT: Patient is a 59y old  Male who presents with a chief complaint of Allogenic stem cell transplant (19 Oct 2019 08:43)      Follow Up: nicm, bm tx    Interim history: The patient reports no new symptoms.  Denies chest discomfort and shortness of breath.  No abdominal pain.  No new neurologic symptoms.      MEDICATIONS  (STANDING):  acetaminophen   Tablet .. 650 milliGRAM(s) Oral every 6 hours  acetaminophen   Tablet .. 650 milliGRAM(s) Oral <User Schedule>  acyclovir   Oral Tab/Cap 400 milliGRAM(s) Oral every 8 hours  ALPRAZolam 0.5 milliGRAM(s) Oral at bedtime  Biotene Dry Mouth Oral Rinse 5 milliLiter(s) Swish and Spit five times a day  cefepime   IVPB 2000 milliGRAM(s) IV Intermittent every 8 hours  chlorhexidine 4% Liquid 1 Application(s) Topical <User Schedule>  clotrimazole Lozenge 1 Lozenge Oral five times a day  diphenhydrAMINE   Injectable 25 milliGRAM(s) IV Push <User Schedule>  docusate sodium 100 milliGRAM(s) Oral three times a day  enalapril 2.5 milliGRAM(s) Oral daily  filgrastim-sndz Injectable 480 MICROGram(s) SubCutaneous daily  finasteride 5 milliGRAM(s) Oral daily  folic acid 1 milliGRAM(s) Oral daily  heparin  Infusion 339 Unit(s)/Hr (3.39 mL/Hr) IV Continuous <Continuous>  immune   globulin 10% (GAMMAGARD) IVPB 30 Gram(s) IV Intermittent <User Schedule>  lidocaine/prilocaine Cream 1 Application(s) Topical daily  magnesium oxide 400 milliGRAM(s) Oral three times a day with meals  metoprolol succinate ER 12.5 milliGRAM(s) Oral two times a day  multivitamin 1 Tablet(s) Oral daily  mycophenolate mofetil 1000 milliGRAM(s) Oral <User Schedule>  pantoprazole    Tablet 40 milliGRAM(s) Oral before breakfast  senna 2 Tablet(s) Oral at bedtime  sertraline 50 milliGRAM(s) Oral at bedtime  sodium bicarbonate Mouth Rinse 10 milliLiter(s) Swish and Spit five times a day  sodium chloride 0.9%. 1000 milliLiter(s) (20 mL/Hr) IV Continuous <Continuous>  tacrolimus 1.5 milliGRAM(s) Oral two times a day  tamsulosin 0.4 milliGRAM(s) Oral at bedtime  ursodiol Capsule 300 milliGRAM(s) Oral two times a day with meals  voriconazole 200 milliGRAM(s) Oral every 12 hours    MEDICATIONS  (PRN):  acetaminophen   Tablet .. 650 milliGRAM(s) Oral every 6 hours PRN Temp greater or equal to 38C (100.4F), Mild Pain (1 - 3)  acetaminophen  IVPB .. 1000 milliGRAM(s) IV Intermittent once PRN Temp greater or equal to 38.5C (101.3F)  diphenhydrAMINE   Injectable 25 milliGRAM(s) IV Push every 4 hours PRN Allergy symptoms  metoclopramide Injectable 10 milliGRAM(s) IV Push every 6 hours PRN Nausea and/or Vomiting  ondansetron Injectable 8 milliGRAM(s) IV Push every 8 hours PRN Nausea and/or Vomiting  sodium chloride 0.9% lock flush 10 milliLiter(s) IV Push every 1 hour PRN Pre/post blood products, medications, blood draw, and to maintain line patency      REVIEW OF SYSTEMS:  eye, ent, GI, , allergic, dermatologic, musculoskeletal and neurologic are negative except as described above    Vital Signs Last 24 Hrs  T(C): 36.6 (20 Oct 2019 05:00), Max: 36.9 (19 Oct 2019 13:44)  T(F): 97.9 (20 Oct 2019 05:00), Max: 98.4 (19 Oct 2019 13:44)  HR: 91 (20 Oct 2019 05:00) (71 - 101)  BP: 113/70 (20 Oct 2019 05:00) (112/75 - 142/85)  BP(mean): --  RR: 18 (20 Oct 2019 05:00) (18 - 18)  SpO2: 96% (20 Oct 2019 05:00) (96% - 100%)    I&O's Summary    19 Oct 2019 07:01  -  20 Oct 2019 07:00  --------------------------------------------------------  IN: 1418.7 mL / OUT: 700 mL / NET: 718.7 mL        Telemetry past 24h:    PHYSICAL EXAM:    Constitutional: well-nourished, well-developed, NAD   HEENT:  MMM, sclerae anicteric, conjunctivae clear, no oral cyanosis.  Pulmonary: Non-labored, breath sounds are clear bilaterally, No wheezing, rales or rhonchi  Cardiovascular: Regular, S1 and S2.  No murmur.  No rubs, gallops or clicks  Gastrointestinal: Bowel Sounds present, soft, nontender.   Lymph: No peripheral edema.   Neurological: Alert, no focal deficits  Skin: No rashes.  Psych:  Mood & affect appropriate    LABS: All Labs Reviewed:                        6.8    <0.10 )-----------( 8        ( 20 Oct 2019 07:05 )             20.4                         7.6    <0.10 )-----------( 12       ( 19 Oct 2019 07:01 )             22.3                         7.3    <0.10 )-----------( 24       ( 18 Oct 2019 07:37 )             21.4     20 Oct 2019 07:05    141    |  105    |  18     ----------------------------<  103    4.3     |  23     |  0.80   19 Oct 2019 07:01    141    |  105    |  17     ----------------------------<  101    4.0     |  23     |  0.80   18 Oct 2019 07:05    142    |  107    |  18     ----------------------------<  99     4.1     |  24     |  0.95     Ca    8.9        20 Oct 2019 07:05  Ca    9.3        19 Oct 2019 07:01  Ca    8.9        18 Oct 2019 07:05  Phos  3.3       20 Oct 2019 07:05  Phos  3.2       19 Oct 2019 07:01  Phos  3.0       18 Oct 2019 07:05  Mg     1.5       20 Oct 2019 07:05  Mg     1.7       19 Oct 2019 07:01  Mg     1.6       18 Oct 2019 07:05    TPro  5.8    /  Alb  3.3    /  TBili  0.4    /  DBili  x      /  AST  8      /  ALT  10     /  AlkPhos  62     20 Oct 2019 07:05  TPro  5.9    /  Alb  3.5    /  TBili  0.6    /  DBili  x      /  AST  11     /  ALT  14     /  AlkPhos  62     19 Oct 2019 07:01  TPro  5.7    /  Alb  3.2    /  TBili  0.4    /  DBili  x      /  AST  12     /  ALT  13     /  AlkPhos  60     18 Oct 2019 07:05          Blood Culture:         RADIOLOGY:    EKG:    Echo:

## 2019-10-21 LAB
ALBUMIN SERPL ELPH-MCNC: 3.4 G/DL — SIGNIFICANT CHANGE UP (ref 3.3–5)
ALP SERPL-CCNC: 62 U/L — SIGNIFICANT CHANGE UP (ref 40–120)
ALT FLD-CCNC: 9 U/L — LOW (ref 10–45)
ANION GAP SERPL CALC-SCNC: 11 MMOL/L — SIGNIFICANT CHANGE UP (ref 5–17)
APTT BLD: 30.5 SEC — SIGNIFICANT CHANGE UP (ref 27.5–36.3)
AST SERPL-CCNC: 8 U/L — LOW (ref 10–40)
BILIRUB DIRECT SERPL-MCNC: 0.1 MG/DL — SIGNIFICANT CHANGE UP (ref 0–0.2)
BILIRUB INDIRECT FLD-MCNC: 0.3 MG/DL — SIGNIFICANT CHANGE UP (ref 0.2–1)
BILIRUB SERPL-MCNC: 0.4 MG/DL — SIGNIFICANT CHANGE UP (ref 0.2–1.2)
BLD GP AB SCN SERPL QL: NEGATIVE — SIGNIFICANT CHANGE UP
BUN SERPL-MCNC: 19 MG/DL — SIGNIFICANT CHANGE UP (ref 7–23)
CALCIUM SERPL-MCNC: 8.8 MG/DL — SIGNIFICANT CHANGE UP (ref 8.4–10.5)
CHLORIDE SERPL-SCNC: 106 MMOL/L — SIGNIFICANT CHANGE UP (ref 96–108)
CO2 SERPL-SCNC: 23 MMOL/L — SIGNIFICANT CHANGE UP (ref 22–31)
CREAT SERPL-MCNC: 0.84 MG/DL — SIGNIFICANT CHANGE UP (ref 0.5–1.3)
FIBRINOGEN PPP-MCNC: 419 MG/DL — SIGNIFICANT CHANGE UP (ref 350–510)
GLUCOSE SERPL-MCNC: 108 MG/DL — HIGH (ref 70–99)
HCT VFR BLD CALC: 20.2 % — CRITICAL LOW (ref 39–50)
HGB BLD-MCNC: 7 G/DL — CRITICAL LOW (ref 13–17)
INR BLD: 1.24 RATIO — HIGH (ref 0.88–1.16)
LDH SERPL L TO P-CCNC: 103 U/L — SIGNIFICANT CHANGE UP (ref 50–242)
MAGNESIUM SERPL-MCNC: 1.8 MG/DL — SIGNIFICANT CHANGE UP (ref 1.6–2.6)
MCHC RBC-ENTMCNC: 30.4 PG — SIGNIFICANT CHANGE UP (ref 27–34)
MCHC RBC-ENTMCNC: 34.7 GM/DL — SIGNIFICANT CHANGE UP (ref 32–36)
MCV RBC AUTO: 87.8 FL — SIGNIFICANT CHANGE UP (ref 80–100)
NRBC # BLD: 0 /100 WBCS — SIGNIFICANT CHANGE UP (ref 0–0)
PHOSPHATE SERPL-MCNC: 3.6 MG/DL — SIGNIFICANT CHANGE UP (ref 2.5–4.5)
PLATELET # BLD AUTO: 14 K/UL — CRITICAL LOW (ref 150–400)
POTASSIUM SERPL-MCNC: 4.5 MMOL/L — SIGNIFICANT CHANGE UP (ref 3.5–5.3)
POTASSIUM SERPL-SCNC: 4.5 MMOL/L — SIGNIFICANT CHANGE UP (ref 3.5–5.3)
PROT SERPL-MCNC: 5.6 G/DL — LOW (ref 6–8.3)
PROTHROM AB SERPL-ACNC: 14.4 SEC — HIGH (ref 10–12.9)
RBC # BLD: 2.3 M/UL — LOW (ref 4.2–5.8)
RBC # FLD: 18.5 % — HIGH (ref 10.3–14.5)
RH IG SCN BLD-IMP: POSITIVE — SIGNIFICANT CHANGE UP
SODIUM SERPL-SCNC: 140 MMOL/L — SIGNIFICANT CHANGE UP (ref 135–145)
TACROLIMUS SERPL-MCNC: 4.9 NG/ML — SIGNIFICANT CHANGE UP
WBC # BLD: <0.1 K/UL — CRITICAL LOW (ref 3.8–10.5)
WBC # FLD AUTO: <0.1 K/UL — CRITICAL LOW (ref 3.8–10.5)

## 2019-10-21 PROCEDURE — 99291 CRITICAL CARE FIRST HOUR: CPT

## 2019-10-21 PROCEDURE — 99232 SBSQ HOSP IP/OBS MODERATE 35: CPT

## 2019-10-21 RX ADMIN — Medication 1 LOZENGE: at 08:39

## 2019-10-21 RX ADMIN — CEFEPIME 100 MILLIGRAM(S): 1 INJECTION, POWDER, FOR SOLUTION INTRAMUSCULAR; INTRAVENOUS at 21:17

## 2019-10-21 RX ADMIN — CEFEPIME 100 MILLIGRAM(S): 1 INJECTION, POWDER, FOR SOLUTION INTRAMUSCULAR; INTRAVENOUS at 06:55

## 2019-10-21 RX ADMIN — Medication 1 MILLIGRAM(S): at 12:44

## 2019-10-21 RX ADMIN — URSODIOL 300 MILLIGRAM(S): 250 TABLET, FILM COATED ORAL at 08:40

## 2019-10-21 RX ADMIN — Medication 12.5 MILLIGRAM(S): at 06:56

## 2019-10-21 RX ADMIN — MYCOPHENOLATE MOFETIL 1000 MILLIGRAM(S): 250 CAPSULE ORAL at 09:39

## 2019-10-21 RX ADMIN — Medication 400 MILLIGRAM(S): at 06:52

## 2019-10-21 RX ADMIN — Medication 10 MILLILITER(S): at 20:02

## 2019-10-21 RX ADMIN — TACROLIMUS 1.5 MILLIGRAM(S): 5 CAPSULE ORAL at 17:45

## 2019-10-21 RX ADMIN — Medication 2.5 MILLIGRAM(S): at 06:52

## 2019-10-21 RX ADMIN — TAMSULOSIN HYDROCHLORIDE 0.4 MILLIGRAM(S): 0.4 CAPSULE ORAL at 21:19

## 2019-10-21 RX ADMIN — Medication 5 MILLILITER(S): at 20:02

## 2019-10-21 RX ADMIN — VORICONAZOLE 200 MILLIGRAM(S): 10 INJECTION, POWDER, LYOPHILIZED, FOR SOLUTION INTRAVENOUS at 17:44

## 2019-10-21 RX ADMIN — MAGNESIUM OXIDE 400 MG ORAL TABLET 400 MILLIGRAM(S): 241.3 TABLET ORAL at 17:45

## 2019-10-21 RX ADMIN — URSODIOL 300 MILLIGRAM(S): 250 TABLET, FILM COATED ORAL at 17:45

## 2019-10-21 RX ADMIN — Medication 10 MILLILITER(S): at 14:39

## 2019-10-21 RX ADMIN — Medication 480 MICROGRAM(S): at 14:38

## 2019-10-21 RX ADMIN — CEFEPIME 100 MILLIGRAM(S): 1 INJECTION, POWDER, FOR SOLUTION INTRAMUSCULAR; INTRAVENOUS at 14:36

## 2019-10-21 RX ADMIN — Medication 5 MILLILITER(S): at 08:39

## 2019-10-21 RX ADMIN — Medication 1 LOZENGE: at 17:21

## 2019-10-21 RX ADMIN — Medication 1 LOZENGE: at 20:02

## 2019-10-21 RX ADMIN — Medication 5 MILLILITER(S): at 00:33

## 2019-10-21 RX ADMIN — Medication 12.5 MILLIGRAM(S): at 17:45

## 2019-10-21 RX ADMIN — MYCOPHENOLATE MOFETIL 1000 MILLIGRAM(S): 250 CAPSULE ORAL at 21:17

## 2019-10-21 RX ADMIN — MAGNESIUM OXIDE 400 MG ORAL TABLET 400 MILLIGRAM(S): 241.3 TABLET ORAL at 08:40

## 2019-10-21 RX ADMIN — Medication 5 MILLILITER(S): at 17:21

## 2019-10-21 RX ADMIN — Medication 10 MILLILITER(S): at 08:38

## 2019-10-21 RX ADMIN — Medication 10 MILLILITER(S): at 00:34

## 2019-10-21 RX ADMIN — VORICONAZOLE 200 MILLIGRAM(S): 10 INJECTION, POWDER, LYOPHILIZED, FOR SOLUTION INTRAVENOUS at 06:52

## 2019-10-21 RX ADMIN — Medication 400 MILLIGRAM(S): at 14:36

## 2019-10-21 RX ADMIN — MAGNESIUM OXIDE 400 MG ORAL TABLET 400 MILLIGRAM(S): 241.3 TABLET ORAL at 12:43

## 2019-10-21 RX ADMIN — Medication 1 LOZENGE: at 12:42

## 2019-10-21 RX ADMIN — CHLORHEXIDINE GLUCONATE 1 APPLICATION(S): 213 SOLUTION TOPICAL at 09:38

## 2019-10-21 RX ADMIN — Medication 1 LOZENGE: at 00:34

## 2019-10-21 RX ADMIN — FINASTERIDE 5 MILLIGRAM(S): 5 TABLET, FILM COATED ORAL at 12:45

## 2019-10-21 RX ADMIN — Medication 0.5 MILLIGRAM(S): at 21:17

## 2019-10-21 RX ADMIN — Medication 5 MILLILITER(S): at 12:42

## 2019-10-21 RX ADMIN — MYCOPHENOLATE MOFETIL 1000 MILLIGRAM(S): 250 CAPSULE ORAL at 14:36

## 2019-10-21 RX ADMIN — SERTRALINE 50 MILLIGRAM(S): 25 TABLET, FILM COATED ORAL at 21:19

## 2019-10-21 RX ADMIN — TACROLIMUS 1.5 MILLIGRAM(S): 5 CAPSULE ORAL at 06:55

## 2019-10-21 RX ADMIN — Medication 400 MILLIGRAM(S): at 21:17

## 2019-10-21 RX ADMIN — Medication 1 TABLET(S): at 12:43

## 2019-10-21 RX ADMIN — Medication 10 MILLILITER(S): at 17:20

## 2019-10-21 RX ADMIN — PANTOPRAZOLE SODIUM 40 MILLIGRAM(S): 20 TABLET, DELAYED RELEASE ORAL at 08:38

## 2019-10-21 NOTE — PROGRESS NOTE ADULT - SUBJECTIVE AND OBJECTIVE BOX
[facility] - Ede Zamudio, Riki, Edgar, Gem, Tabitha Garcia  Office Number:  [phone]    Patient resting comfortably in bed in NAD.  Laying flat with no respiratory distress.  No complaints of chest pain, dyspnea, palpitations, PND, or orthopnea.  says he is feeling fine this morning.    ROS: negative unless otherwise mentioned.    Telemetry:  off    MEDICATIONS  (STANDING):  acetaminophen   Tablet .. 650 milliGRAM(s) Oral every 6 hours  acetaminophen   Tablet .. 650 milliGRAM(s) Oral <User Schedule>  acyclovir   Oral Tab/Cap 400 milliGRAM(s) Oral every 8 hours  ALPRAZolam 0.5 milliGRAM(s) Oral at bedtime  Biotene Dry Mouth Oral Rinse 5 milliLiter(s) Swish and Spit five times a day  cefepime   IVPB 2000 milliGRAM(s) IV Intermittent every 8 hours  chlorhexidine 4% Liquid 1 Application(s) Topical <User Schedule>  clotrimazole Lozenge 1 Lozenge Oral five times a day  diphenhydrAMINE   Injectable 25 milliGRAM(s) IV Push <User Schedule>  docusate sodium 100 milliGRAM(s) Oral three times a day  enalapril 2.5 milliGRAM(s) Oral daily  filgrastim-sndz Injectable 480 MICROGram(s) SubCutaneous daily  finasteride 5 milliGRAM(s) Oral daily  folic acid 1 milliGRAM(s) Oral daily  heparin  Infusion 339 Unit(s)/Hr (3.39 mL/Hr) IV Continuous <Continuous>  immune   globulin 10% (GAMMAGARD) IVPB 30 Gram(s) IV Intermittent <User Schedule>  lidocaine/prilocaine Cream 1 Application(s) Topical daily  magnesium oxide 400 milliGRAM(s) Oral three times a day with meals  metoprolol succinate ER 12.5 milliGRAM(s) Oral two times a day  multivitamin 1 Tablet(s) Oral daily  mycophenolate mofetil 1000 milliGRAM(s) Oral <User Schedule>  pantoprazole    Tablet 40 milliGRAM(s) Oral before breakfast  senna 2 Tablet(s) Oral at bedtime  sertraline 50 milliGRAM(s) Oral at bedtime  sodium bicarbonate Mouth Rinse 10 milliLiter(s) Swish and Spit five times a day  sodium chloride 0.9%. 1000 milliLiter(s) (20 mL/Hr) IV Continuous <Continuous>  tacrolimus 1.5 milliGRAM(s) Oral two times a day  tamsulosin 0.4 milliGRAM(s) Oral at bedtime  ursodiol Capsule 300 milliGRAM(s) Oral two times a day with meals  voriconazole 200 milliGRAM(s) Oral every 12 hours    MEDICATIONS  (PRN):  acetaminophen   Tablet .. 650 milliGRAM(s) Oral every 6 hours PRN Temp greater or equal to 38C (100.4F), Mild Pain (1 - 3)  acetaminophen  IVPB .. 1000 milliGRAM(s) IV Intermittent once PRN Temp greater or equal to 38.5C (101.3F)  diphenhydrAMINE   Injectable 25 milliGRAM(s) IV Push every 4 hours PRN Allergy symptoms  metoclopramide Injectable 10 milliGRAM(s) IV Push every 6 hours PRN Nausea and/or Vomiting  ondansetron Injectable 8 milliGRAM(s) IV Push every 8 hours PRN Nausea and/or Vomiting  sodium chloride 0.9% lock flush 10 milliLiter(s) IV Push every 1 hour PRN Pre/post blood products, medications, blood draw, and to maintain line patency      Allergies    No Known Allergies    Intolerances        Vital Signs Last 24 Hrs  T(C): 36.6 (21 Oct 2019 05:32), Max: 36.7 (20 Oct 2019 14:30)  T(F): 97.9 (21 Oct 2019 05:32), Max: 98 (20 Oct 2019 14:30)  HR: 95 (21 Oct 2019 05:32) (78 - 95)  BP: 125/82 (21 Oct 2019 05:32) (113/73 - 128/86)  BP(mean): --  RR: 18 (21 Oct 2019 05:32) (17 - 19)  SpO2: 96% (21 Oct 2019 05:32) (95% - 99%)    I&O's Summary    20 Oct 2019 07:01  -  21 Oct 2019 07:00  --------------------------------------------------------  IN: 2036 mL / OUT: 1400 mL / NET: 636 mL        ON EXAM:    Constitutional: well-nourished, well-developed, NAD   HEENT:  MMM, sclerae anicteric, conjunctivae clear, no oral cyanosis.  Pulmonary: Non-labored, breath sounds are clear bilaterally, No wheezing, rales or rhonchi  Cardiovascular: Regular, S1 and S2.  No murmur.  No rubs, gallops or clicks  Gastrointestinal: Bowel Sounds present, soft, nontender.   Lymph: No peripheral edema.   Neurological: Alert, no focal deficits  Skin: No rashes.  Psych:  Mood & affect appropriate    LABS: All Labs Reviewed:                        6.8    <0.10 )-----------( 8        ( 20 Oct 2019 07:05 )             20.4                         7.6    <0.10 )-----------( 12       ( 19 Oct 2019 07:01 )             22.3     20 Oct 2019 07:05    141    |  105    |  18     ----------------------------<  103    4.3     |  23     |  0.80   19 Oct 2019 07:01    141    |  105    |  17     ----------------------------<  101    4.0     |  23     |  0.80     Ca    8.9        20 Oct 2019 07:05  Ca    9.3        19 Oct 2019 07:01  Phos  3.3       20 Oct 2019 07:05  Phos  3.2       19 Oct 2019 07:01  Mg     1.5       20 Oct 2019 07:05  Mg     1.7       19 Oct 2019 07:01    TPro  5.8    /  Alb  3.3    /  TBili  0.4    /  DBili  x      /  AST  8      /  ALT  10     /  AlkPhos  62     20 Oct 2019 07:05  TPro  5.9    /  Alb  3.5    /  TBili  0.6    /  DBili  x      /  AST  11     /  ALT  14     /  AlkPhos  62     19 Oct 2019 07:01          Blood Culture:

## 2019-10-21 NOTE — PROGRESS NOTE ADULT - ATTENDING COMMENTS
.  Unique is accompanied by Mother .  Referring provider is Yolanda Willis MD.    Chief Complaint   Patient presents with   • New Patient     CC: Ear pruritis, fungal otitis externa    HPI:  Unique is a 9 year old girl who was referred to ID clinic by ENT for assistance in the management of fungal otitis externa.  Unique's mother reports to me that Unique was doing well until the beginning of January. At that time, she was diagnosed with a sinus infection. She was also told at that time that she had some black stuff in her ears.  Her ears were also quite itchy and irritated.  She took the amoxicillin, but she did not seem to have much improvement in regards to her ears.  She was then prescribed a combination antibiotic/steroid drop (mom cannot exactly remember the name but thinks it may have been ciprodex when I suggested that to her); these drops made the situation worse. Given these findings, she was referred to otolaryngology.     Unique saw Dr. Willis on 1/24/19. Given the appearance of black debris in the in the ear canals, Dr. Willis had concern for fungal otitis externa.  Culture was sent, and it became positive for Wangiella dermatitidis. She was started on clotrimazole drops.  She has been on the clotrimazole for about 6 days but she reports very little benefit. In discussing the case with Dr. Willis, it seems that her exam has shown some improvement, but still contains black debris.  Given this, Unique was referred to ID clinic.    ROS:  Constitutional: No fever, fatigue, or decreased energy  Eye: No visual disturbance or conjunctivitis  HENT: Some nasal congestion. +ear pain and ear pruritis  CV: No chest pain  Respiratory: No cough or SOB  GI: No nausea, vomiting, diarrhea, or abdominal pain  Skin: No rash  Neuro: Occasional headache  Immuno: No known immunodeficiencies  All other systems negative.      PMH:  Allergic rhinitis. On flonase. Had a period where she had frequent pneumonia, sinus infection, and ear infection. She  59 year old male, with previously diagnosed acute myeloid leukemia. s/p induction chemotherapy with Daunorubicin/Cytarabine and HIDAC consolidation chemotherapy, now admitted for Haplo-identical stem cell transplant from son. Conditioning regimen of Fludarabine, Cytoxan, and TBI. Other history includes non-ischemic cardiomyopathy (recent EF 25% 9/19/19), COLLEEN, DVT/PE, HTN, Factor V Leiden and amputation of right toe due to osteomyelitis.  Day + 11.  Febrile likely due to haplostorm,  s/p CTX 2/2.  Follow temps if persist can dose 1 mg/kg of solumedrol.  Blood/urine cultures from 10/10 negative.  Continue cefepime/voriconazole for now along with acyclovir prophylaxis.  If continues to spike, can consider adding IV vanco.  Strict I&O, daily weights, prn diuresis-   Renal insufficiency- monitor daily creatinine, normal today.   Started  Zarxio on day +5,  MMF and Tacrolimus. Check Tacrolimus levels on Monday and Thursday.   Anxiolytics, antinausea, antidiarrhea medications as needed   Nutritional support.  Hx non-ischemic CM, followed by Dr. Luke Lyn, last Echo 9/19 (EF 25%).  Monitor for fluid overload.  VOD prophylaxis - low dose heparin gtt (dosed at 100 units / kg / day), glutamine supplementation, Actigall BID   GI prophylaxis - Protonix po QD.  Transfusion / electrolyte support  Aggressive mouth care and skin care as per protocol. was seen by Dr. Dejesus in immunology and evaluated for an antibody deficiency, but she responded appropriately and this was ruled out.    Immunizations Reviewed: up to date per parent.    Social Hx:  Lives with: mom, dad, 10 yo sister  Sick Contacts: none  Travel: None  Animal Exposures: Dogs, horses  Environmental Exposures: Water park trip on [place]    Family Hx:  No health issue. No history of immunodeficiency or unusual infection.    Physical Examination:  Gen: Well appearing, no acute distress  Eye: PERRL, EOMI, no conjunctivitis, no icterus  HENT: Normocephalic, MMM, oropharynx normal in appearance.  R ear canal with considerable debris, flecked with numerous black spots.  L ear with abrasion noted in the external ear canal  Resp: Lungs CTAB, BS equal, non-labored breathing  CV: Regular rate and rhythm, no murmurs, rubs or gallops  GI: Soft, NT, ND  Lymph: No cervical  Neuro: Acts appropriate for age, no focal deficits    Vitals:    02/14/19 1154   BP: 108/67   Pulse: 73   Temp: 97.4 °F (36.3 °C)   TempSrc: Oral   Weight: 39.9 kg (87 lb 15.4 oz)   Height: 5' 1.22\" (1.555 m)       Review:  All laboratory and radiology reviewed.    Laboratory data of note:  1/24 ear culture: Wangiella dermatitidis      Assessment:  Unique is an 9 year girl with fungal otitis externa.  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 This is black yeast, which is not a common cause of infections, but infections have been reported.   In immunocompetent individuals it is a known cause of fungal otitis externa. I suspect that it is indeed the agent driving Unique's symptoms here.    As Wangiella dermatitidis infection is not common, there is no consensus treatment.  Voriconazole typically has activity against the fungus. Given the lack of a full response to the clotrimazole, I would recommend doing a 2 week course of voriconazole and monitoring Unique's response.    Plan:  1. Voriconazole 200 mg by mouth twice daily x 2  weeks.  2. Continue clotrimazole.  3. Follow up with ID clinic in 2 weeks.    Plan discussed with family and any questions they had were answered. Family is to call the [facility] ID clinic with questions at [phone].    Vel Su MD, PhD  Pediatric Infectious Disease     59 year old male, with previously diagnosed acute myeloid leukemia. s/p induction chemotherapy with Daunorubicin/Cytarabine and HIDAC consolidation chemotherapy, now admitted for Haplo-identical stem cell transplant from son. Conditioning regimen of Fludarabine, Cytoxan, and TBI. Other history includes non-ischemic cardiomyopathy (recent EF 25% 9/19/19), COLLEEN, DVT/PE, HTN, Factor V Leiden and amputation of right toe due to osteomyelitis.  Day + 12.  Febrile likely due to haplostorm,  s/p CTX 2/2.  Follow temps if persist can dose 1 mg/kg of solumedrol.  Blood/urine cultures from 10/10 negative.  Continue cefepime/voriconazole for now along with acyclovir prophylaxis.  If continues to spike, can consider adding IV vanco.  Strict I&O, daily weights, prn diuresis-   Renal insufficiency- monitor daily creatinine, normal today.   Started  Zarxio on day +5,  MMF and Tacrolimus. Check Tacrolimus levels on Monday and Thursday.   Anxiolytics, antinausea, antidiarrhea medications as needed   Nutritional support.  Hx non-ischemic CM, followed by Dr. Luke Lyn, last Echo 9/19 (EF 25%).  Monitor for fluid overload.  VOD prophylaxis - low dose heparin gtt (dosed at 100 units / kg / day), glutamine supplementation, Actigall BID   GI prophylaxis - Protonix po QD.  Transfusion / electrolyte support  Aggressive mouth care and skin care as per protocol. 59 year old male, with previously diagnosed acute myeloid leukemia. s/p induction chemotherapy with Daunorubicin/Cytarabine and HIDAC consolidation chemotherapy, now admitted for Haplo-identical stem cell transplant from son. Conditioning regimen of Fludarabine, Cytoxan, and TBI. Other history includes non-ischemic cardiomyopathy (recent EF 25% 9/19/19), COLLEEN, DVT/PE, HTN, Factor V Leiden and amputation of right toe due to osteomyelitis.  Day + 12.  Febrile likely due to haplostorm,  s/p CTX 2/2.  Follow temps if persist can dose 1 mg/kg of solumedrol.  Blood/urine cultures from 10/10 negative.  Continue cefepime/voriconazole for now along with acyclovir prophylaxis.  If continues to spike, can consider adding IV vanco.  Strict I&O, daily weights, prn diuresis-   Renal insufficiency- monitor daily creatinine, normal today.   Started  Zarxio on day +5,  MMF and Tacrolimus. Check Tacrolimus levels on Monday and Thursday.   Anxiolytics, antinausea, antidiarrhea medications as needed   Nutritional support.  Hx non-ischemic CM, followed by Dr. Luke Lyn, last Echo 9/19 (EF 25%).  Monitor for fluid overload.  VOD prophylaxis - low dose heparin gtt (dosed at 100 units / kg / day), glutamine supplementation, Actigall BID   GI prophylaxis - Protonix po QD.  Electrolyte support  Aggressive mouth care and skin care as per protocol.  OOB

## 2019-10-21 NOTE — PROGRESS NOTE ADULT - ASSESSMENT
59 year old male, with previously diagnosed acute myeloid leukemia. s/p induction chemotherapy with Daunorubicin/Cytarabine and HIDAC consolidation chemotherapy, now admitted for Haplo-identical stem cell transplant from son:    - No evidence of volume overload at present.  He is on fluids and has been getting IV Lasix prn to maintain net negative.  His recent CXR is clear.  - Continue to maintain strict I's and O's, and to monitor for signs of volume overload, which he is at risk for.  - No evidence of acute ischemia  - BP is overall stable  - Continue ACEI  and BB at current doses as tolerated by BP. He has been able to get them over the last 4 days  - On heparin gtt for hx of dvt/pe and hypercoagulable state, and adjust rate per protocol. Monitor platelet count which is now 14.  No bleeding, though is clearly at risk.  - Monitor and replete lytes  - To follow while admitted

## 2019-10-21 NOTE — PROGRESS NOTE ADULT - SUBJECTIVE AND OBJECTIVE BOX
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    S: Patient seen and examined with HPC Transplant Team:   No acute complaints     Denies mouth / tongue / throat pain, dyspnea, cough, nausea, vomiting, diarrhea, abdominal pain     O: Vitals:   Vital Signs Last 24 Hrs  T(C): 36.6 (21 Oct 2019 05:32), Max: 36.7 (20 Oct 2019 14:30)  T(F): 97.9 (21 Oct 2019 05:32), Max: 98 (20 Oct 2019 14:30)  HR: 95 (21 Oct 2019 05:32) (78 - 95)  BP: 125/82 (21 Oct 2019 05:32) (113/73 - 128/86)  RR: 18 (21 Oct 2019 05:32) (17 - 19)  SpO2: 96% (21 Oct 2019 05:32) (95% - 99%)    Admit weight: 81.5 kg  Daily     Daily Weight in k.9 (20 Oct 2019 09:40)    Intake / Output:   10-20 @ 07:  -  10-21 @ 07:00  --------------------------------------------------------  IN: 2036 mL / OUT: 1400 mL / NET: 636 mL      PE:   Oropharynx: no ulceration or erythema  Oral Mucositis:   -                                             Grade: -  CVS: reg S1 S2  Lungs: CTA bilaterally   Abdomen: soft, + normoactive BS, NT, ND  Extremities: No C/C/E. Toe amputation  Gastric Mucositis:     -                                             Grade: -  Intestinal Mucositis:      -                                        Grade: -  Skin: no rash  TLC:  C/D/I  Neuro: Alert and oriented x 3  Pain: none     Labs:   CBC Full  -  ( 21 Oct 2019 07:11 )  WBC Count : <0.10 K/uL  Hemoglobin : 7.0 g/dL  Hematocrit : 20.2 %  Platelet Count - Automated : 14 K/uL  Mean Cell Volume : 87.8 fl  Mean Cell Hemoglobin : 30.4 pg  Mean Cell Hemoglobin Concentration : 34.7 gm/dL  Auto Neutrophil # : x  Auto Lymphocyte # : x  Auto Monocyte # : x  Auto Eosinophil # : x  Auto Basophil # : x  Auto Neutrophil % : x  Auto Lymphocyte % : x  Auto Monocyte % : x  Auto Eosinophil % : x  Auto Basophil % : x                          7.0    <0.10 )-----------( 14       ( 21 Oct 2019 07:11 )             20.2     10-21    140  |  106  |  19  ----------------------------<  108<H>  4.5   |  23  |  0.84    Ca    8.8      21 Oct 2019 07:10  Phos  3.6     10-21  Mg     1.8     10-21    TPro  5.6<L>  /  Alb  3.4  /  TBili  0.4  /  DBili  0.1  /  AST  8<L>  /  ALT  9<L>  /  AlkPhos  62  10-21    PT/INR - ( 21 Oct 2019 07:11 )   PT: 14.4 sec;   INR: 1.24 ratio         PTT - ( 21 Oct 2019 07:11 )  PTT:30.5 sec  LIVER FUNCTIONS - ( 21 Oct 2019 07:10 )  Alb: 3.4 g/dL / Pro: 5.6 g/dL / ALK PHOS: 62 U/L / ALT: 9 U/L / AST: 8 U/L / GGT: x           Lactate Dehydrogenase, Serum: 103 U/L (10-21 @ 07:10)      Cultures:         Radiology:       Meds:   Antimicrobials:   acyclovir   Oral Tab/Cap 400 milliGRAM(s) Oral every 8 hours  cefepime   IVPB 2000 milliGRAM(s) IV Intermittent every 8 hours  clotrimazole Lozenge 1 Lozenge Oral five times a day  voriconazole 200 milliGRAM(s) Oral every 12 hours      Heme / Onc:   heparin  Infusion 339 Unit(s)/Hr IV Continuous <Continuous>      GI:  docusate sodium 100 milliGRAM(s) Oral three times a day  pantoprazole    Tablet 40 milliGRAM(s) Oral before breakfast  senna 2 Tablet(s) Oral at bedtime  sodium bicarbonate Mouth Rinse 10 milliLiter(s) Swish and Spit five times a day  ursodiol Capsule 300 milliGRAM(s) Oral two times a day with meals      Cardiovascular:   enalapril 2.5 milliGRAM(s) Oral daily  metoprolol succinate ER 12.5 milliGRAM(s) Oral two times a day  tamsulosin 0.4 milliGRAM(s) Oral at bedtime      Immunologic:   filgrastim-sndz Injectable 480 MICROGram(s) SubCutaneous daily  immune   globulin 10% (GAMMAGARD) IVPB 30 Gram(s) IV Intermittent <User Schedule>  mycophenolate mofetil 1000 milliGRAM(s) Oral <User Schedule>  tacrolimus 1.5 milliGRAM(s) Oral two times a day      Other medications:   acetaminophen   Tablet .. 650 milliGRAM(s) Oral every 6 hours  acetaminophen   Tablet .. 650 milliGRAM(s) Oral <User Schedule>  ALPRAZolam 0.5 milliGRAM(s) Oral at bedtime  Biotene Dry Mouth Oral Rinse 5 milliLiter(s) Swish and Spit five times a day  chlorhexidine 4% Liquid 1 Application(s) Topical <User Schedule>  diphenhydrAMINE   Injectable 25 milliGRAM(s) IV Push <User Schedule>  finasteride 5 milliGRAM(s) Oral daily  folic acid 1 milliGRAM(s) Oral daily  lidocaine/prilocaine Cream 1 Application(s) Topical daily  magnesium oxide 400 milliGRAM(s) Oral three times a day with meals  multivitamin 1 Tablet(s) Oral daily  sertraline 50 milliGRAM(s) Oral at bedtime  sodium chloride 0.9%. 1000 milliLiter(s) IV Continuous <Continuous>      PRN:   acetaminophen   Tablet .. 650 milliGRAM(s) Oral every 6 hours PRN  acetaminophen  IVPB .. 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 Dose increased to 1.5 mg bid. Level 4.3 on 10/19, continue same dose      1. Infectious Disease:   acyclovir   Oral Tab/Cap 400 milliGRAM(s) Oral every 8 hours  cefepime   IVPB 2000 milliGRAM(s) IV Intermittent every 8 hours  clotrimazole Lozenge 1 Lozenge Oral five times a day  voriconazole 200 milliGRAM(s) Oral every 12 hours    2. VOD Prophylaxis: Actigall, Glutamine, Heparin (dosed at 100 units / kg / day)     3. GI Prophylaxis:   pantoprazole    Tablet 40 milliGRAM(s) Oral before breakfast    4. Mouthcare - NS / NaHCO3 rinses, Mycelex, Biotene; Skin care     5. GVHD prophylaxis   mycophenolate mofetil 1000 milliGRAM(s) Oral <User Schedule>  tacrolimus 1.5 milliGRAM(s) Oral two times a day    6. Transfuse & replete electrolytes prn   magnesium oxide 400mg po TID     7. IV hydration, daily weights, strict I&O, prn diuresis     8. PO intake as tolerated, nutrition follow up as needed, MVI, folic acid     9. Antiemetics, anti-diarrhea medications:   metoclopramide Injectable 10 milliGRAM(s) IV Push every 6 hours PRN  ondansetron Injectable 8 milliGRAM(s) IV Push every 8 hours  aprepitant 80 milliGRAM(s) Oral every 24 hours    10. OOB as tolerated, physical therapy consult if needed     11. Monitor coags / fibrinogen 2x week, vitamin K as needed     12. Monitor closely for clinical changes, monitor for fevers     13. Emotional support provided, plan of care discussed and questions addressed     14. Patient education done regarding plan of care, restrictions and discharge planning     15. Continue regular social work input     I have written the above note for Dr. Quiñones who performed service with me in the room.   Susan Albarado NP-C ([phone])    I have seen and examined patient with NP, I agree with above note as scribed. 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    S: Patient seen and examined with HPC Transplant Team:   No acute complaints     Denies mouth / tongue / throat pain, dyspnea, cough, nausea, vomiting, diarrhea, abdominal pain     O: Vitals:   Vital Signs Last 24 Hrs  T(C): 36.6 (21 Oct 2019 05:32), Max: 36.7 (20 Oct 2019 14:30)  T(F): 97.9 (21 Oct 2019 05:32), Max: 98 (20 Oct 2019 14:30)  HR: 95 (21 Oct 2019 05:32) (78 - 95)  BP: 125/82 (21 Oct 2019 05:32) (113/73 - 128/86)  RR: 18 (21 Oct 2019 05:32) (17 - 19)  SpO2: 96% (21 Oct 2019 05:32) (95% - 99%)    Admit weight: 81.5 kg  Daily Weight in k.9 (20 Oct 2019 09:40)  Today's weight:     Intake / Output:   10-20 @ 07:01  -  10-21 @ 07:00  --------------------------------------------------------  IN: 2036 mL / OUT: 1400 mL / NET: 636 mL      PE:   Oropharynx: no ulceration or erythema  Oral Mucositis:   -                                             Grade: -  CVS: reg S1 S2  Lungs: CTA bilaterally   Abdomen: soft, + normoactive BS, NT, ND  Extremities: No C/C/E. Toe amputation  Gastric Mucositis:     -                                             Grade: -  Intestinal Mucositis:      -                                        Grade: -  Skin: no rash  TLC:  C/D/I  Neuro: Alert and oriented x 3  Pain: none     Labs:   CBC Full  -  ( 21 Oct 2019 07:11 )  WBC Count : <0.10 K/uL  Hemoglobin : 7.0 g/dL  Hematocrit : 20.2 %  Platelet Count - Automated : 14 K/uL  Mean Cell Volume : 87.8 fl  Mean Cell Hemoglobin : 30.4 pg  Mean Cell Hemoglobin Concentration : 34.7 gm/dL  Auto Neutrophil # : x  Auto Lymphocyte # : x  Auto Monocyte # : x  Auto Eosinophil # : x  Auto Basophil # : x  Auto Neutrophil % : x  Auto Lymphocyte % : x  Auto Monocyte % : x  Auto Eosinophil % : x  Auto Basophil % : x                          7.0    <0.10 )-----------( 14       ( 21 Oct 2019 07:11 )             20.2     10-21    140  |  106  |  19  ----------------------------<  108<H>  4.5   |  23  |  0.84    Ca    8.8      21 Oct 2019 07:10  Phos  3.6     10-21  Mg     1.8     10-21    TPro  5.6<L>  /  Alb  3.4  /  TBili  0.4  /  DBili  0.1  /  AST  8<L>  /  ALT  9<L>  /  AlkPhos  62  10-21    PT/INR - ( 21 Oct 2019 07:11 )   PT: 14.4 sec;   INR: 1.24 ratio         PTT - ( 21 Oct 2019 07:11 )  PTT:30.5 sec  LIVER FUNCTIONS - ( 21 Oct 2019 07:10 )  Alb: 3.4 g/dL / Pro: 5.6 g/dL / ALK PHOS: 62 U/L / ALT: 9 U/L / AST: 8 U/L / GGT: x           Lactate Dehydrogenase, Serum: 103 U/L (10-21 @ 07:10)    Cultures:   Culture Results: urine  No growth (10.13.19 @ 04:27)    Culture Results: blood  No growth at 5 days. (10.12.19 @ 22:28)    Culture Results: blood  No growth at 5 days. (10.12.19 @ 22:28)    Culture Results: urine  <10,000 CFU/mL Normal Urogenital Pretty (10.10.19 @ 19:07)    Culture Results: blood   No growth at 5 days. (10.10.19 @ 17:16)    Radiology:   EXAM:  XR CHEST PORTABLE ROUTINE 1V                        PROCEDURE DATE:  10/12/2019    IMPRESSION: Clear lungs.    Meds:   Antimicrobials:   acyclovir   Oral Tab/Cap 400 milliGRAM(s) Oral every 8 hours  cefepime   IVPB 2000 milliGRAM(s) IV Intermittent every 8 hours  clotrimazole Lozenge 1 Lozenge Oral five times a day  voriconazole 200 milliGRAM(s) Oral every 12 hours      Heme / Onc:   heparin  Infusion 339 Unit(s)/Hr IV Continuous <Continuous>      GI:  docusate sodium 100 milliGRAM(s) Oral three times a day  pantoprazole    Tablet 40 milliGRAM(s) Oral before breakfast  senna 2 Tablet(s) Oral at bedtime  sodium bicarbonate Mouth Rinse 10 milliLiter(s) Swish and Spit five times a day  ursodiol Capsule 300 milliGRAM(s) Oral two times a day with meals      Cardiovascular:   enalapril 2.5 milliGRAM(s) Oral daily  metoprolol succinate ER 12.5 milliGRAM(s) Oral two times a day  tamsulosin 0.4 milliGRAM(s) Oral at bedtime      Immunologic:   filgrastim-sndz Injectable 480 MICROGram(s) SubCutaneous daily  immune   globulin 10% (GAMMAGARD) IVPB 30 Gram(s) IV Intermittent <User Schedule>  mycophenolate mofetil 1000 milliGRAM(s) Oral <User Schedule>  tacrolimus 1.5 milliGRAM(s) Oral two times a day      Other medications:   acetaminophen   Tablet .. 650 milliGRAM(s) Oral every 6 hours  acetaminophen   Tablet .. 650 milliGRAM(s) Oral <User Schedule>  ALPRAZolam 0.5 milliGRAM(s) Oral at bedtime  Biotene Dry Mouth Oral Rinse 5 milliLiter(s) Swish and Spit five times a day  chlorhexidine 4% Liquid 1 Application(s) Topical <User Schedule>  diphenhydrAMINE   Injectable 25 milliGRAM(s) IV Push <User Schedule>  finasteride 5 milliGRAM(s) Oral daily  folic acid 1 milliGRAM(s) Oral daily  lidocaine/prilocaine Cream 1 Application(s) Topical daily  magnesium oxide 400 milliGRAM(s) Oral three times a day with meals  multivitamin 1 Tablet(s) Oral daily  sertraline 50 milliGRAM(s) Oral at bedtime  sodium chloride 0.9%. 1000 milliLiter(s) IV Continuous <Continuous>      PRN:   acetaminophen   Tablet .. 650 milliGRAM(s) Oral every 6 hours PRN  acetaminophen  IVPB .. 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 Dose increased to 1.5 mg bid. Level 4.3 on 10/19, continue same dose      1. Infectious Disease:   acyclovir   Oral Tab/Cap 400 milliGRAM(s) Oral every 8 hours  cefepime   IVPB 2000 milliGRAM(s) IV Intermittent every 8 hours  clotrimazole Lozenge 1 Lozenge Oral five times a day  voriconazole 200 milliGRAM(s) Oral every 12 hours    2. VOD Prophylaxis: Actigall, Glutamine, Heparin (dosed at 100 units / kg / day)     3. GI Prophylaxis:   pantoprazole    Tablet 40 milliGRAM(s) Oral before breakfast    4. Mouthcare - NS / NaHCO3 rinses, Mycelex, Biotene; Skin care     5. GVHD prophylaxis   mycophenolate mofetil 1000 milliGRAM(s) Oral <User Schedule>  tacrolimus 1.5 milliGRAM(s) Oral two times a day    Culture Results:   No growth at 5 days. (10.10.19 @ 17:16)    6. Transfuse & replete electrolytes prn   magnesium oxide 400mg po TID     7. IV hydration, daily weights, strict I&O, prn diuresis     8. PO intake as tolerated, nutrition follow up as needed, MVI, folic acid     9. Antiemetics, anti-diarrhea medications:   metoclopramide Injectable 10 milliGRAM(s) IV Push every 6 hours PRN  ondansetron Injectable 8 milliGRAM(s) IV Push every 8 hours  aprepitant 80 milliGRAM(s) Oral every 24 hours    10. OOB as tolerated, physical therapy consult if needed     11. Monitor coags / fibrinogen 2x week, vitamin K as needed     12. Monitor closely for clinical changes, monitor for fevers     13. Emotional support provided, plan of care discussed and questions addressed     14. Patient education done regarding plan of care, restrictions and discharge planning     15. Continue regular social work input     I have written the above note for Dr. Quiñones who performed service with me in the room.   Susan Grey NP-C ([phone])    I have seen and examined patient with NP, I agree with above note as scribed. 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    S: Patient seen and examined with HPC Transplant Team:   No acute complaints     Denies mouth / tongue / throat pain, dyspnea, cough, nausea, vomiting, diarrhea, abdominal pain     O: Vitals:   Vital Signs Last 24 Hrs  T(C): 36.6 (21 Oct 2019 05:32), Max: 36.7 (20 Oct 2019 14:30)  T(F): 97.9 (21 Oct 2019 05:32), Max: 98 (20 Oct 2019 14:30)  HR: 95 (21 Oct 2019 05:32) (78 - 95)  BP: 125/82 (21 Oct 2019 05:32) (113/73 - 128/86)  RR: 18 (21 Oct 2019 05:32) (17 - 19)  SpO2: 96% (21 Oct 2019 05:32) (95% - 99%)    Admit weight: 81.5 kg  Daily Weight in k.9 (20 Oct 2019 09:40)  Today's weight: 78.6 kg (21 Oct 2019 09:06)    Intake / Output:   10- @ 07:01  -  10-21 @ 07:00  --------------------------------------------------------  IN: 2036 mL / OUT: 1400 mL / NET: 636 mL      PE:   Oropharynx: no ulceration or erythema  Oral Mucositis:   -                                             Grade: -  CVS: reg S1 S2  Lungs: CTA bilaterally   Abdomen: soft, + normoactive BS, NT, ND  Extremities: No C/C/E. Toe amputation  Gastric Mucositis:     -                                             Grade: -  Intestinal Mucositis:      -                                        Grade: -  Skin: patchy erythematous rash back and upper chest  TLC:  C/D/I  Neuro: Alert and oriented x 3  Pain: none     Labs:   CBC Full  -  ( 21 Oct 2019 07:11 )  WBC Count : <0.10 K/uL  Hemoglobin : 7.0 g/dL  Hematocrit : 20.2 %  Platelet Count - Automated : 14 K/uL  Mean Cell Volume : 87.8 fl  Mean Cell Hemoglobin : 30.4 pg  Mean Cell Hemoglobin Concentration : 34.7 gm/dL  Auto Neutrophil # : x  Auto Lymphocyte # : x  Auto Monocyte # : x  Auto Eosinophil # : x  Auto Basophil # : x  Auto Neutrophil % : x  Auto Lymphocyte % : x  Auto Monocyte % : x  Auto Eosinophil % : x  Auto Basophil % : x                          7.0    <0.10 )-----------( 14       ( 21 Oct 2019 07:11 )             20.2     10-    140  |  106  |  19  ----------------------------<  108<H>  4.5   |  23  |  0.84    Ca    8.8      21 Oct 2019 07:10  Phos  3.6     10-21  Mg     1.8     10-21    TPro  5.6<L>  /  Alb  3.4  /  TBili  0.4  /  DBili  0.1  /  AST  8<L>  /  ALT  9<L>  /  AlkPhos  62  10-21    PT/INR - ( 21 Oct 2019 07:11 )   PT: 14.4 sec;   INR: 1.24 ratio         PTT - ( 21 Oct 2019 07:11 )  PTT:30.5 sec  LIVER FUNCTIONS - ( 21 Oct 2019 07:10 )  Alb: 3.4 g/dL / Pro: 5.6 g/dL / ALK PHOS: 62 U/L / ALT: 9 U/L / AST: 8 U/L / GGT: x           Lactate Dehydrogenase, Serum: 103 U/L (10-21 @ 07:10)    Cultures:   Culture Results: urine  No growth (10.13.19 @ 04:27)    Culture Results: blood  No growth at 5 days. (10.12.19 @ 22:28)    Culture Results: blood  No growth at 5 days. (10.12.19 @ 22:28)    Culture Results: urine  <10,000 CFU/mL Normal Urogenital Pretty (10.10.19 @ 19:07)    Culture Results: blood   No growth at 5 days. (10.10.19 @ 17:16)    Radiology:   EXAM:  XR CHEST PORTABLE ROUTINE 1V                        PROCEDURE DATE:  10/12/2019    IMPRESSION: Clear lungs.    Meds:   Antimicrobials:   acyclovir   Oral Tab/Cap 400 milliGRAM(s) Oral every 8 hours  cefepime   IVPB 2000 milliGRAM(s) IV Intermittent every 8 hours  clotrimazole Lozenge 1 Lozenge Oral five times a day  voriconazole 200 milliGRAM(s) Oral every 12 hours      Heme / Onc:   heparin  Infusion 339 Unit(s)/Hr IV Continuous <Continuous>      GI:  docusate sodium 100 milliGRAM(s) Oral three times a day  pantoprazole    Tablet 40 milliGRAM(s) Oral before breakfast  senna 2 Tablet(s) Oral at bedtime  sodium bicarbonate Mouth Rinse 10 milliLiter(s) Swish and Spit five times a day  ursodiol Capsule 300 milliGRAM(s) Oral two times a day with meals      Cardiovascular:   enalapril 2.5 milliGRAM(s) Oral daily  metoprolol succinate ER 12.5 milliGRAM(s) Oral two times a day  tamsulosin 0.4 milliGRAM(s) Oral at bedtime      Immunologic:   filgrastim-sndz Injectable 480 MICROGram(s) SubCutaneous daily  immune   globulin 10% (GAMMAGARD) IVPB 30 Gram(s) IV Intermittent <User Schedule>  mycophenolate mofetil 1000 milliGRAM(s) Oral <User Schedule>  tacrolimus 1.5 milliGRAM(s) Oral two times a day      Other medications:   acetaminophen   Tablet .. 650 milliGRAM(s) Oral every 6 hours  acetaminophen   Tablet .. 650 milliGRAM(s) Oral <User Schedule>  ALPRAZolam 0.5 milliGRAM(s) Oral at bedtime  Biotene Dry Mouth Oral Rinse 5 milliLiter(s) Swish and Spit five times a day  chlorhexidine 4% Liquid 1 Application(s) Topical <User Schedule>  diphenhydrAMINE   Injectable 25 milliGRAM(s) IV Push <User Schedule>  finasteride 5 milliGRAM(s) Oral daily  folic acid 1 milliGRAM(s) Oral daily  lidocaine/prilocaine Cream 1 Application(s) Topical daily  magnesium oxide 400 milliGRAM(s) Oral three times a day with meals  multivitamin 1 Tablet(s) Oral daily  sertraline 50 milliGRAM(s) Oral at bedtime  sodium chloride 0.9%. 1000 milliLiter(s) IV Continuous <Continuous>      PRN:   acetaminophen   Tablet .. 650 milliGRAM(s) Oral every 6 hours PRN  acetaminophen  IVPB .. 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 Dose increased to 1.5 mg bid. Level 4.3 on 10/19, continue same dose      1. Infectious Disease:   acyclovir   Oral Tab/Cap 400 milliGRAM(s) Oral every 8 hours  cefepime   IVPB 2000 milliGRAM(s) IV Intermittent every 8 hours  clotrimazole Lozenge 1 Lozenge Oral five times a day  voriconazole 200 milliGRAM(s) Oral every 12 hours    2. VOD Prophylaxis: Actigall, Glutamine, Heparin (dosed at 100 units / kg / day)     3. GI Prophylaxis:   pantoprazole    Tablet 40 milliGRAM(s) Oral before breakfast    4. Mouthcare - NS / NaHCO3 rinses, Mycelex, Biotene; Skin care     5. GVHD prophylaxis   mycophenolate mofetil 1000 milliGRAM(s) Oral <User Schedule>  tacrolimus 1.5 milliGRAM(s) Oral two times a day    Culture Results:   No growth at 5 days. (10.10.19 @ 17:16)    6. Transfuse & replete electrolytes prn   magnesium oxide 400mg po TID     7. IV hydration, daily weights, strict I&O, prn diuresis     8. PO intake as tolerated, nutrition follow up as needed, MVI, folic acid     9. Antiemetics, anti-diarrhea medications:   metoclopramide Injectable 10 milliGRAM(s) IV Push every 6 hours PRN  ondansetron Injectable 8 milliGRAM(s) IV Push every 8 hours  aprepitant 80 milliGRAM(s) Oral every 24 hours    10. OOB as tolerated, physical therapy consult if needed     11. Monitor coags / fibrinogen 2x week, vitamin K as needed     12. Monitor closely for clinical changes, monitor for fevers     13. Emotional support provided, plan of care discussed and questions addressed     14. Patient education done regarding plan of care, restrictions and discharge planning     15. Continue regular social work input     I have written the above note for Dr. Quiñones who performed service with me in the room.   Susan Grey NP-C ([phone])    I have seen and examined patient with NP, I agree with above note as scribed. 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    S: Patient seen and examined with HPC Transplant Team:   No acute complaints     Denies mouth / tongue / throat pain, dyspnea, cough, nausea, vomiting, diarrhea, abdominal pain     O: Vitals:   Vital Signs Last 24 Hrs  T(C): 36.6 (21 Oct 2019 05:32), Max: 36.7 (20 Oct 2019 14:30)  T(F): 97.9 (21 Oct 2019 05:32), Max: 98 (20 Oct 2019 14:30)  HR: 95 (21 Oct 2019 05:32) (78 - 95)  BP: 125/82 (21 Oct 2019 05:32) (113/73 - 128/86)  RR: 18 (21 Oct 2019 05:32) (17 - 19)  SpO2: 96% (21 Oct 2019 05:32) (95% - 99%)    Admit weight: 81.5 kg  Daily Weight in k.9 (20 Oct 2019 09:40)  Today's weight: 78.6 kg (21 Oct 2019 09:06)    Intake / Output:   10- @ 07:01  -  10-21 @ 07:00  --------------------------------------------------------  IN: 2036 mL / OUT: 1400 mL / NET: 636 mL      PE:   Oropharynx: no ulceration or erythema  Oral Mucositis:   -                                             Grade: -  CVS: RRR, normal S1 S2  Lungs: CTA bilaterally   Abdomen: soft, + normoactive BS, NT, ND  Extremities: warm, no edema. Toe amputation  Gastric Mucositis:     -                                             Grade: -  Intestinal Mucositis:      -                                        Grade: -  Skin: patchy erythematous rash back and upper chest  TLC:  C/D/I  Neuro: Alert and oriented x 3  Pain: none     Labs:   CBC Full  -  ( 21 Oct 2019 07:11 )  WBC Count : <0.10 K/uL  Hemoglobin : 7.0 g/dL  Hematocrit : 20.2 %  Platelet Count - Automated : 14 K/uL  Mean Cell Volume : 87.8 fl  Mean Cell Hemoglobin : 30.4 pg  Mean Cell Hemoglobin Concentration : 34.7 gm/dL  Auto Neutrophil # : x  Auto Lymphocyte # : x  Auto Monocyte # : x  Auto Eosinophil # : x  Auto Basophil # : x  Auto Neutrophil % : x  Auto Lymphocyte % : x  Auto Monocyte % : x  Auto Eosinophil % : x  Auto Basophil % : x                          7.0    <0.10 )-----------( 14       ( 21 Oct 2019 07:11 )             20.2     10-    140  |  106  |  19  ----------------------------<  108<H>  4.5   |  23  |  0.84    Ca    8.8      21 Oct 2019 07:10  Phos  3.6     10-21  Mg     1.8     10-21    TPro  5.6<L>  /  Alb  3.4  /  TBili  0.4  /  DBili  0.1  /  AST  8<L>  /  ALT  9<L>  /  AlkPhos  62  10-21    PT/INR - ( 21 Oct 2019 07:11 )   PT: 14.4 sec;   INR: 1.24 ratio         PTT - ( 21 Oct 2019 07:11 )  PTT:30.5 sec  LIVER FUNCTIONS - ( 21 Oct 2019 07:10 )  Alb: 3.4 g/dL / Pro: 5.6 g/dL / ALK PHOS: 62 U/L / ALT: 9 U/L / AST: 8 U/L / GGT: x           Lactate Dehydrogenase, Serum: 103 U/L (10-21 @ 07:10)    Cultures:   Culture Results: urine  No growth (10.13.19 @ 04:27)    Culture Results: blood  No growth at 5 days. (10.12.19 @ 22:28)    Culture Results: blood  No growth at 5 days. (10.12.19 @ 22:28)    Culture Results: urine  <10,000 CFU/mL Normal Urogenital Pretty (10.10.19 @ 19:07)    Culture Results: blood   No growth at 5 days. (10.10.19 @ 17:16)    Radiology:   EXAM:  XR CHEST PORTABLE ROUTINE 1V                        PROCEDURE DATE:  10/12/2019    IMPRESSION: Clear lungs.    Meds:   Antimicrobials:   acyclovir   Oral Tab/Cap 400 milliGRAM(s) Oral every 8 hours  cefepime   IVPB 2000 milliGRAM(s) IV Intermittent every 8 hours  clotrimazole Lozenge 1 Lozenge Oral five times a day  voriconazole 200 milliGRAM(s) Oral every 12 hours      Heme / Onc:   heparin  Infusion 339 Unit(s)/Hr IV Continuous <Continuous>      GI:  docusate sodium 100 milliGRAM(s) Oral three times a day  pantoprazole    Tablet 40 milliGRAM(s) Oral before breakfast  senna 2 Tablet(s) Oral at bedtime  sodium bicarbonate Mouth Rinse 10 milliLiter(s) Swish and Spit five times a day  ursodiol Capsule 300 milliGRAM(s) Oral two times a day with meals      Cardiovascular:   enalapril 2.5 milliGRAM(s) Oral daily  metoprolol succinate ER 12.5 milliGRAM(s) Oral two times a day  tamsulosin 0.4 milliGRAM(s) Oral at bedtime      Immunologic:   filgrastim-sndz Injectable 480 MICROGram(s) SubCutaneous daily  immune   globulin 10% (GAMMAGARD) IVPB 30 Gram(s) IV Intermittent <User Schedule>  mycophenolate mofetil 1000 milliGRAM(s) Oral <User Schedule>  tacrolimus 1.5 milliGRAM(s) Oral two times a day      Other medications:   acetaminophen   Tablet .. 650 milliGRAM(s) Oral every 6 hours  acetaminophen   Tablet .. 650 milliGRAM(s) Oral <User Schedule>  ALPRAZolam 0.5 milliGRAM(s) Oral at bedtime  Biotene Dry Mouth Oral Rinse 5 milliLiter(s) Swish and Spit five times a day  chlorhexidine 4% Liquid 1 Application(s) Topical <User Schedule>  diphenhydrAMINE   Injectable 25 milliGRAM(s) IV Push <User Schedule>  finasteride 5 milliGRAM(s) Oral daily  folic acid 1 milliGRAM(s) Oral daily  lidocaine/prilocaine Cream 1 Application(s) Topical daily  magnesium oxide 400 milliGRAM(s) Oral three times a day with meals  multivitamin 1 Tablet(s) Oral daily  sertraline 50 milliGRAM(s) Oral at bedtime  sodium chloride 0.9%. 1000 milliLiter(s) IV Continuous <Continuous>      PRN:   acetaminophen   Tablet .. 650 milliGRAM(s) Oral every 6 hours PRN  acetaminophen  IVPB .. 1000 milliGRAM(s) IV Intermittent once PRN  diphenhydrAMINE   Injectable 25 milliGRAM(s) IV Push every 4 hours PRN  metoclopramide Injectable 10 milliGRAM(s) IV Push every 6 hours PRN  ondansetron Injectable 8 milliGRAM(s) IV Push every 8 hours PRN  sodium chloride 0.9% lock flush 10 milliLiter(s) IV Push every 1 hour PRN    A/P: 59 year old male with a history of AML  Post Allogeneic PBSCT day +12   Strict I&O, daily weights, prn diuresis   Cardiology following   10/14- Start Tacro, Cellcept and Zarxio  Subtherapeutic tacrolimus level on 10/17. Dose increased to 1.5 mg bid. Level 4.3 on 10/19, continue same dose      1. Infectious Disease:   acyclovir   Oral Tab/Cap 400 milliGRAM(s) Oral every 8 hours  cefepime   IVPB 2000 milliGRAM(s) IV Intermittent every 8 hours  clotrimazole Lozenge 1 Lozenge Oral five times a day  voriconazole 200 milliGRAM(s) Oral every 12 hours    2. VOD Prophylaxis: Actigall, Glutamine, Heparin (dosed at 100 units / kg / day)     3. GI Prophylaxis:   pantoprazole    Tablet 40 milliGRAM(s) Oral before breakfast    4. Mouth care - NS / NaHCO3 rinses, Mycelex, Biotene; Skin care     5. GVHD prophylaxis   mycophenolate mofetil 1000 milliGRAM(s) Oral <User Schedule>  tacrolimus 1.5 milliGRAM(s) Oral two times a day    Culture Results:   No growth at 5 days. (10.10.19 @ 17:16)    6. Transfuse & replete electrolytes prn   magnesium oxide 400mg po TID     7. IV hydration, daily weights, strict I&O, prn diuresis     8. PO intake as tolerated, nutrition follow up as needed, MVI, folic acid     9. Antiemetics, anti-diarrhea medications:   metoclopramide Injectable 10 milliGRAM(s) IV Push every 6 hours PRN  ondansetron Injectable 8 milliGRAM(s) IV Push every 8 hours  aprepitant 80 milliGRAM(s) Oral every 24 hours    10. OOB as tolerated, physical therapy consult if needed     11. Monitor coags / fibrinogen 2x week, vitamin K as needed     12. Monitor closely for clinical changes, monitor for fevers     13. Emotional support provided, plan of care discussed and questions addressed     14. Patient education done regarding plan of care, restrictions and discharge planning     15. Continue regular social work input     I have written the above note for Dr. Quiñones who performed service with me in the room.   Susan Grey NP-C ([phone])    I have seen and examined patient with NP, I agree with above note as scribed.

## 2019-10-22 LAB
ALBUMIN SERPL ELPH-MCNC: 3.3 G/DL — SIGNIFICANT CHANGE UP (ref 3.3–5)
ALP SERPL-CCNC: 65 U/L — SIGNIFICANT CHANGE UP (ref 40–120)
ALT FLD-CCNC: 10 U/L — SIGNIFICANT CHANGE UP (ref 10–45)
ANION GAP SERPL CALC-SCNC: 10 MMOL/L — SIGNIFICANT CHANGE UP (ref 5–17)
APPEARANCE UR: CLEAR — SIGNIFICANT CHANGE UP
AST SERPL-CCNC: 12 U/L — SIGNIFICANT CHANGE UP (ref 10–40)
BACTERIA # UR AUTO: NEGATIVE — SIGNIFICANT CHANGE UP
BILIRUB SERPL-MCNC: 0.4 MG/DL — SIGNIFICANT CHANGE UP (ref 0.2–1.2)
BILIRUB UR-MCNC: NEGATIVE — SIGNIFICANT CHANGE UP
BLD GP AB SCN SERPL QL: NEGATIVE — SIGNIFICANT CHANGE UP
BUN SERPL-MCNC: 18 MG/DL — SIGNIFICANT CHANGE UP (ref 7–23)
CALCIUM SERPL-MCNC: 9.2 MG/DL — SIGNIFICANT CHANGE UP (ref 8.4–10.5)
CHLORIDE SERPL-SCNC: 103 MMOL/L — SIGNIFICANT CHANGE UP (ref 96–108)
CO2 SERPL-SCNC: 25 MMOL/L — SIGNIFICANT CHANGE UP (ref 22–31)
COLOR SPEC: YELLOW — SIGNIFICANT CHANGE UP
CREAT SERPL-MCNC: 0.86 MG/DL — SIGNIFICANT CHANGE UP (ref 0.5–1.3)
DATE OF TRANSF RX: SIGNIFICANT CHANGE UP
DIFF PNL FLD: NEGATIVE — SIGNIFICANT CHANGE UP
EPI CELLS # UR: 1 /HPF — SIGNIFICANT CHANGE UP
GLUCOSE SERPL-MCNC: 103 MG/DL — HIGH (ref 70–99)
GLUCOSE UR QL: NEGATIVE — SIGNIFICANT CHANGE UP
HCT VFR BLD CALC: 19.9 % — CRITICAL LOW (ref 39–50)
HGB BLD-MCNC: 6.6 G/DL — CRITICAL LOW (ref 13–17)
HYALINE CASTS # UR AUTO: 0 /LPF — SIGNIFICANT CHANGE UP (ref 0–2)
KETONES UR-MCNC: NEGATIVE — SIGNIFICANT CHANGE UP
LDH SERPL L TO P-CCNC: 97 U/L — SIGNIFICANT CHANGE UP (ref 50–242)
LEUKOCYTE ESTERASE UR-ACNC: NEGATIVE — SIGNIFICANT CHANGE UP
MAGNESIUM SERPL-MCNC: 1.6 MG/DL — SIGNIFICANT CHANGE UP (ref 1.6–2.6)
MCHC RBC-ENTMCNC: 29.5 PG — SIGNIFICANT CHANGE UP (ref 27–34)
MCHC RBC-ENTMCNC: 33.2 GM/DL — SIGNIFICANT CHANGE UP (ref 32–36)
MCV RBC AUTO: 88.8 FL — SIGNIFICANT CHANGE UP (ref 80–100)
NITRITE UR-MCNC: NEGATIVE — SIGNIFICANT CHANGE UP
NRBC # BLD: 0 /100 WBCS — SIGNIFICANT CHANGE UP (ref 0–0)
PH UR: 6 — SIGNIFICANT CHANGE UP (ref 5–8)
PHOSPHATE SERPL-MCNC: 4.1 MG/DL — SIGNIFICANT CHANGE UP (ref 2.5–4.5)
PLATELET # BLD AUTO: 8 K/UL — CRITICAL LOW (ref 150–400)
POTASSIUM SERPL-MCNC: 4.4 MMOL/L — SIGNIFICANT CHANGE UP (ref 3.5–5.3)
POTASSIUM SERPL-SCNC: 4.4 MMOL/L — SIGNIFICANT CHANGE UP (ref 3.5–5.3)
PROT SERPL-MCNC: 5.5 G/DL — LOW (ref 6–8.3)
PROT UR-MCNC: ABNORMAL
RBC # BLD: 2.24 M/UL — LOW (ref 4.2–5.8)
RBC # FLD: 17.8 % — HIGH (ref 10.3–14.5)
RBC CASTS # UR COMP ASSIST: 1 /HPF — SIGNIFICANT CHANGE UP (ref 0–4)
RH IG SCN BLD-IMP: POSITIVE — SIGNIFICANT CHANGE UP
SODIUM SERPL-SCNC: 138 MMOL/L — SIGNIFICANT CHANGE UP (ref 135–145)
SP GR SPEC: 1.02 — SIGNIFICANT CHANGE UP (ref 1.01–1.02)
UROBILINOGEN FLD QL: NEGATIVE — SIGNIFICANT CHANGE UP
WBC # BLD: <0.1 K/UL — CRITICAL LOW (ref 3.8–10.5)
WBC # FLD AUTO: <0.1 K/UL — CRITICAL LOW (ref 3.8–10.5)
WBC UR QL: 1 /HPF — SIGNIFICANT CHANGE UP (ref 0–5)

## 2019-10-22 PROCEDURE — 99291 CRITICAL CARE FIRST HOUR: CPT

## 2019-10-22 PROCEDURE — 99232 SBSQ HOSP IP/OBS MODERATE 35: CPT

## 2019-10-22 PROCEDURE — 86078 PHYS BLOOD BANK SERV REACTJ: CPT

## 2019-10-22 RX ORDER — ALPRAZOLAM 0.25 MG
1 TABLET ORAL AT BEDTIME
Refills: 0 | Status: DISCONTINUED | OUTPATIENT
Start: 2019-10-22 | End: 2019-10-26

## 2019-10-22 RX ORDER — HYDROCORTISONE 20 MG
50 TABLET ORAL ONCE
Refills: 0 | Status: COMPLETED | OUTPATIENT
Start: 2019-10-22 | End: 2019-10-22

## 2019-10-22 RX ORDER — DIPHENHYDRAMINE HCL 50 MG
25 CAPSULE ORAL ONCE
Refills: 0 | Status: COMPLETED | OUTPATIENT
Start: 2019-10-22 | End: 2019-10-22

## 2019-10-22 RX ORDER — HYDROCORTISONE 20 MG
50 TABLET ORAL ONCE
Refills: 0 | Status: COMPLETED | OUTPATIENT
Start: 2019-10-22 | End: 2019-10-25

## 2019-10-22 RX ADMIN — Medication 25 MILLIGRAM(S): at 09:12

## 2019-10-22 RX ADMIN — Medication 1 LOZENGE: at 08:26

## 2019-10-22 RX ADMIN — PANTOPRAZOLE SODIUM 40 MILLIGRAM(S): 20 TABLET, DELAYED RELEASE ORAL at 06:39

## 2019-10-22 RX ADMIN — TACROLIMUS 1.5 MILLIGRAM(S): 5 CAPSULE ORAL at 17:41

## 2019-10-22 RX ADMIN — Medication 400 MILLIGRAM(S): at 21:56

## 2019-10-22 RX ADMIN — Medication 10 MILLILITER(S): at 08:25

## 2019-10-22 RX ADMIN — Medication 12.5 MILLIGRAM(S): at 17:40

## 2019-10-22 RX ADMIN — URSODIOL 300 MILLIGRAM(S): 250 TABLET, FILM COATED ORAL at 08:26

## 2019-10-22 RX ADMIN — VORICONAZOLE 200 MILLIGRAM(S): 10 INJECTION, POWDER, LYOPHILIZED, FOR SOLUTION INTRAVENOUS at 06:39

## 2019-10-22 RX ADMIN — Medication 25 MILLIGRAM(S): at 10:30

## 2019-10-22 RX ADMIN — Medication 10 MILLILITER(S): at 20:06

## 2019-10-22 RX ADMIN — CHLORHEXIDINE GLUCONATE 1 APPLICATION(S): 213 SOLUTION TOPICAL at 08:27

## 2019-10-22 RX ADMIN — Medication 10 MILLILITER(S): at 00:26

## 2019-10-22 RX ADMIN — Medication 5 MILLILITER(S): at 17:39

## 2019-10-22 RX ADMIN — Medication 5 MILLILITER(S): at 13:01

## 2019-10-22 RX ADMIN — Medication 10 MILLILITER(S): at 13:08

## 2019-10-22 RX ADMIN — CEFEPIME 100 MILLIGRAM(S): 1 INJECTION, POWDER, FOR SOLUTION INTRAMUSCULAR; INTRAVENOUS at 13:06

## 2019-10-22 RX ADMIN — Medication 10 MILLILITER(S): at 17:32

## 2019-10-22 RX ADMIN — VORICONAZOLE 200 MILLIGRAM(S): 10 INJECTION, POWDER, LYOPHILIZED, FOR SOLUTION INTRAVENOUS at 17:40

## 2019-10-22 RX ADMIN — SERTRALINE 50 MILLIGRAM(S): 25 TABLET, FILM COATED ORAL at 21:56

## 2019-10-22 RX ADMIN — Medication 1 LOZENGE: at 00:26

## 2019-10-22 RX ADMIN — Medication 1 LOZENGE: at 20:06

## 2019-10-22 RX ADMIN — Medication 5 MILLILITER(S): at 00:26

## 2019-10-22 RX ADMIN — Medication 1 LOZENGE: at 17:39

## 2019-10-22 RX ADMIN — TACROLIMUS 1.5 MILLIGRAM(S): 5 CAPSULE ORAL at 06:39

## 2019-10-22 RX ADMIN — MYCOPHENOLATE MOFETIL 1000 MILLIGRAM(S): 250 CAPSULE ORAL at 21:56

## 2019-10-22 RX ADMIN — CEFEPIME 100 MILLIGRAM(S): 1 INJECTION, POWDER, FOR SOLUTION INTRAMUSCULAR; INTRAVENOUS at 21:56

## 2019-10-22 RX ADMIN — Medication 1 MILLIGRAM(S): at 13:02

## 2019-10-22 RX ADMIN — MAGNESIUM OXIDE 400 MG ORAL TABLET 400 MILLIGRAM(S): 241.3 TABLET ORAL at 13:01

## 2019-10-22 RX ADMIN — Medication 480 MICROGRAM(S): at 13:07

## 2019-10-22 RX ADMIN — CEFEPIME 100 MILLIGRAM(S): 1 INJECTION, POWDER, FOR SOLUTION INTRAMUSCULAR; INTRAVENOUS at 06:40

## 2019-10-22 RX ADMIN — Medication 2.5 MILLIGRAM(S): at 06:39

## 2019-10-22 RX ADMIN — MYCOPHENOLATE MOFETIL 1000 MILLIGRAM(S): 250 CAPSULE ORAL at 13:08

## 2019-10-22 RX ADMIN — MYCOPHENOLATE MOFETIL 1000 MILLIGRAM(S): 250 CAPSULE ORAL at 08:27

## 2019-10-22 RX ADMIN — Medication 12.5 MILLIGRAM(S): at 06:39

## 2019-10-22 RX ADMIN — Medication 1 TABLET(S): at 13:01

## 2019-10-22 RX ADMIN — Medication 1 MILLIGRAM(S): at 22:00

## 2019-10-22 RX ADMIN — MAGNESIUM OXIDE 400 MG ORAL TABLET 400 MILLIGRAM(S): 241.3 TABLET ORAL at 17:40

## 2019-10-22 RX ADMIN — Medication 400 MILLIGRAM(S): at 06:39

## 2019-10-22 RX ADMIN — Medication 50 MILLIGRAM(S): at 10:30

## 2019-10-22 RX ADMIN — Medication 5 MILLILITER(S): at 08:26

## 2019-10-22 RX ADMIN — Medication 400 MILLIGRAM(S): at 13:02

## 2019-10-22 RX ADMIN — Medication 5 MILLILITER(S): at 20:06

## 2019-10-22 RX ADMIN — FINASTERIDE 5 MILLIGRAM(S): 5 TABLET, FILM COATED ORAL at 13:02

## 2019-10-22 RX ADMIN — MAGNESIUM OXIDE 400 MG ORAL TABLET 400 MILLIGRAM(S): 241.3 TABLET ORAL at 08:26

## 2019-10-22 RX ADMIN — TAMSULOSIN HYDROCHLORIDE 0.4 MILLIGRAM(S): 0.4 CAPSULE ORAL at 21:56

## 2019-10-22 RX ADMIN — Medication 1 LOZENGE: at 13:01

## 2019-10-22 RX ADMIN — URSODIOL 300 MILLIGRAM(S): 250 TABLET, FILM COATED ORAL at 17:41

## 2019-10-22 NOTE — PROGRESS NOTE ADULT - ATTENDING COMMENTS
59 year old male, with previously diagnosed acute myeloid leukemia. s/p induction chemotherapy with Daunorubicin/Cytarabine and HIDAC consolidation chemotherapy, now admitted for Haplo-identical stem cell transplant from son. Conditioning regimen of Fludarabine, Cytoxan, and TBI. Other history includes non-ischemic cardiomyopathy (recent EF 25% 9/19/19), COLLEEN, DVT/PE, HTN, Factor V Leiden and amputation of right toe due to osteomyelitis.  Day + 13.  Febrile likely due to haplostorm,  s/p CTX 2/2.  Follow temps if persist can dose 1 mg/kg of solumedrol.  Blood/urine cultures from 10/10 negative.  Continue cefepime/voriconazole for now along with acyclovir prophylaxis.  If continues to spike, can consider adding IV vanco.  Strict I&O, daily weights, prn diuresis-   Renal insufficiency- monitor daily creatinine, normal today.   Started  Zarxio on day +5,  MMF and Tacrolimus. Check Tacrolimus levels on Monday and Thursday.   Anxiolytics, antinausea, antidiarrhea medications as needed   Nutritional support.  Hx non-ischemic CM, followed by Dr. Luke Lyn, last Echo 9/19 (EF 25%).  Monitor for fluid overload.  VOD prophylaxis - low dose heparin gtt (dosed at 100 units / kg / day), glutamine supplementation, Actigall BID   GI prophylaxis - Protonix po QD.  Electrolyte support  Aggressive mouth care and skin care as per protocol.  OOB 59 year old male, with previously diagnosed acute myeloid leukemia. s/p induction chemotherapy with Daunorubicin/Cytarabine and HIDAC consolidation chemotherapy, now admitted for Haplo-identical stem cell transplant from son. Conditioning regimen of Fludarabine, Cytoxan, and TBI. Other history includes non-ischemic cardiomyopathy (recent EF 25% 9/19/19), COLLEEN, DVT/PE, HTN, Factor V Leiden and amputation of right toe due to osteomyelitis.  Day + 13.  Febrile likely due to haplostorm,  s/p CTX 2/2.  Follow temps if persist can dose 1 mg/kg of solumedrol.  Blood/urine cultures from 10/10 negative.  Continue cefepime/voriconazole for now along with acyclovir prophylaxis.  If continues to spike, can consider adding IV vanco.  Strict I&O, daily weights, prn diuresis-   Renal insufficiency- monitor daily creatinine, normal today.   Started  Zarxio on day +5,  MMF and Tacrolimus. Check Tacrolimus levels on Monday and Thursday.   Anxiolytics, antinausea, antidiarrhea medications as needed.  Nutritional support.  Hx non-ischemic CM, followed by Dr. Luke Lyn, last Echo 9/19 (EF 25%).  Monitor for fluid overload.  VOD prophylaxis - low dose heparin gtt (dosed at 100 units / kg / day), glutamine supplementation, Actigall BID   GI prophylaxis - Protonix po QD.  Electrolyte support  Aggressive mouth care and skin care as per protocol.  OOB

## 2019-10-22 NOTE — CHART NOTE - NSCHARTNOTEFT_GEN_A_CORE
Patient seen for nutrition follow up. Pt with AML s/p haploidentical PBSCT day +13. Pt afebrile, noted to have hives & rash this morning.       Source: Information obtained from pt & comprehensive chart review completed.    Diet : Regular + Glutasolve 1 packet daily.    Patient reports:    PO intake :    Source for PO intake: Pt, nursing flowsheets.    Weights:   Pt with varying weights, however remains relatively stable between ~170-180 lbs.    Pertinent Medications: Colace, senna, zofran prn, reglan prn, protonix, magnesium oxide, folic acid, MVI, biotene mouth rinse, sodium bicarbonate mouth rinse, IV NaCl 0.9% @ 20 mL/hr  Pertinent Labs: (10/22/19) Glu 103 <H>    As per nursing documentation, skin intact & no edema noted at this time.    Estimated Needs:   [ ] no change since previous assessment  [ ] recalculated:     Previous Nutrition Diagnosis:   Nutrition Diagnosis is:    New Nutrition Diagnosis:  Related to:    As evidenced by:      Interventions:     Recommend  1)    Monitoring and Evaluation:     Continue to monitor Nutritional intake, Tolerance to diet prescription, weights, labs, skin integrity    RD remains available upon request and will follow up per protocol Patient seen for nutrition follow up. Pt with AML s/p haploidentical PBSCT day +13. Pt afebrile at this time, noted to have hives & rash this morning.     Source: Information obtained from pt & comprehensive chart review completed.    Diet: Regular + Glutasolve 1 packet daily.    Patient reports appetite good at this time, feeling hungry however pt is fatigued of in house menu items.  Pt's girlfriend continues to bring food from home; noted bringing protein powder today, will follow up to assess. Pt denies N+V, no GI distress; last BM 10/22.     PO intake: Fair, pt consuming ~2 larger meals per day; canned soup, 3-4 yogurt shakes/day, ice cream/pudding. Pt reports taste alterations, however communicates attempt at maintaining adequate po intake. Pt continues to participate in Shake it Up, reports 100% intake of shake yesterday,    Source for PO intake: Pt, nursing flowsheets.    Weights: (10/22/19) 173.9 lbs, (10/18/19) 175.2 lbs, (10/16/19) 174.1 lbs, (10/3/19) 179 lbs.  Pt with varying weights, however remains relatively stable between ~170-180 lbs.    Pertinent Medications: Colace, senna, zofran prn, reglan prn, protonix, magnesium oxide, folic acid, MVI, biotene mouth rinse, sodium bicarbonate mouth rinse, IV NaCl 0.9% @ 20 mL/hr  Pertinent Labs: (10/22/19) Glu 103 <H>    As per nursing documentation, skin intact & no edema noted at this time.    Estimated Needs:   [X] no change since previous assessment    Previous Nutrition Diagnosis: [X] Inadequate oral intake  Nutrition Diagnosis is: [X] Ongoing; Nutrition care plan in process.  New Nutrition Diagnosis: [X] Not applicable    Interventions:   1)Encouraged pt to mix protein powder with more calorie dense options like milk/yogurt & supplement with high calorie items such as cake/ice cream.  2)Reinforced importance of adequate po intake to maintain weight & nutritional status.  3)Reviewed transplant food safety regarding food brought from home.  4)Preferences obtained & will honor as possible. To send magic cup (provides 290 gerald, 9gm protein per 4 oz serving).    Monitoring and Evaluation:   [X] PO intake [X] Weights [X] Nutrition related labs [X] BMs    RD remains available upon request and will follow up per protocol.    Letty Kramer, Dietetic Intern Pager #[phone] Patient seen for nutrition follow up. Pt with AML s/p haploidentical PBSCT day +13. Pt afebrile at this time, noted to have hives & rash this morning.     Source: Information obtained from pt & comprehensive chart review completed.    Diet: Regular + Glutasolve 1 packet daily.    Patient reports appetite good at this time, feeling hungry however pt is fatigued of in house menu items.  Pt's girlfriend continues to bring food from home; noted bringing protein powder today, will follow up to assess. Pt denies N+V, no GI distress; last BM 10/22.     PO intake: Fair, pt consuming ~2 larger meals per day; canned soup, 3-4 yogurt shakes/day, ice cream/pudding. Pt reports taste alterations, however communicates attempt at maintaining adequate po intake. Pt continues to participate in Shake it Up, reports 100% intake of shake yesterday,    Source for PO intake: Pt, nursing flowsheets.    Weights: (10/22/19) 173.9 lbs, (10/18/19) 175.2 lbs, (10/16/19) 174.1 lbs, (10/3/19) 179 lbs.  Pt with varying weights, however remains relatively stable between ~170-180 lbs.    Pertinent Medications: Colace, senna, zofran prn, reglan prn, protonix, magnesium oxide, folic acid, multivitamin, biotene mouth rinse, sodium bicarbonate mouth rinse, IV NaCl 0.9% @ 20 mL/hr  Pertinent Labs: (10/22/19) Glu 103 <H>    As per nursing documentation, skin intact & no edema noted at this time.    Estimated Needs:   [X] no change since previous assessment    Previous Nutrition Diagnosis: [X] Inadequate oral intake  Nutrition Diagnosis is: [X] Ongoing; Nutrition care plan in process.  New Nutrition Diagnosis: [X] Not applicable    Interventions:   1)Encouraged pt to mix protein powder with more calorie dense options like milk/yogurt & supplement with high calorie items such as cake/ice cream.  2)Reinforced importance of adequate po intake to maintain weight & nutritional status.  3)Reviewed transplant food safety regarding food brought from home.  4)Preferences obtained & will honor as possible. To send magic cup tonight for dinner (provides 290 gerald, 9gm protein per 4 oz serving).    Monitoring and Evaluation:   [X] PO intake [X] Weights [X] Nutrition related labs [X] BMs    RD remains available upon request and will follow up per protocol.    Letty Kramer, Dietetic Intern Pager #[phone]

## 2019-10-22 NOTE — PROGRESS NOTE ADULT - SUBJECTIVE AND OBJECTIVE BOX
[facility] - Ede Zamudio, Riki, Edgar, Gem, Tabitha Garcia  Office Number:  [phone]    Patient resting comfortably in bed in NAD.  Laying flat with no respiratory distress.  No complaints of chest pain, dyspnea, palpitations, PND, or orthopnea.    F/U for:  Cardiomyopathy      MEDICATIONS  (STANDING):  acetaminophen   Tablet .. 650 milliGRAM(s) Oral every 6 hours  acetaminophen   Tablet .. 650 milliGRAM(s) Oral <User Schedule>  acyclovir   Oral Tab/Cap 400 milliGRAM(s) Oral every 8 hours  ALPRAZolam 0.5 milliGRAM(s) Oral at bedtime  Biotene Dry Mouth Oral Rinse 5 milliLiter(s) Swish and Spit five times a day  cefepime   IVPB 2000 milliGRAM(s) IV Intermittent every 8 hours  chlorhexidine 4% Liquid 1 Application(s) Topical <User Schedule>  clotrimazole Lozenge 1 Lozenge Oral five times a day  diphenhydrAMINE   Injectable 25 milliGRAM(s) IV Push <User Schedule>  docusate sodium 100 milliGRAM(s) Oral three times a day  enalapril 2.5 milliGRAM(s) Oral daily  filgrastim-sndz Injectable 480 MICROGram(s) SubCutaneous daily  finasteride 5 milliGRAM(s) Oral daily  folic acid 1 milliGRAM(s) Oral daily  heparin  Infusion 339 Unit(s)/Hr (3.39 mL/Hr) IV Continuous <Continuous>  immune   globulin 10% (GAMMAGARD) IVPB 30 Gram(s) IV Intermittent <User Schedule>  lidocaine/prilocaine Cream 1 Application(s) Topical daily  magnesium oxide 400 milliGRAM(s) Oral three times a day with meals  metoprolol succinate ER 12.5 milliGRAM(s) Oral two times a day  multivitamin 1 Tablet(s) Oral daily  mycophenolate mofetil 1000 milliGRAM(s) Oral <User Schedule>  pantoprazole    Tablet 40 milliGRAM(s) Oral before breakfast  senna 2 Tablet(s) Oral at bedtime  sertraline 50 milliGRAM(s) Oral at bedtime  sodium bicarbonate Mouth Rinse 10 milliLiter(s) Swish and Spit five times a day  sodium chloride 0.9%. 1000 milliLiter(s) (20 mL/Hr) IV Continuous <Continuous>  tacrolimus 1.5 milliGRAM(s) Oral two times a day  tamsulosin 0.4 milliGRAM(s) Oral at bedtime  ursodiol Capsule 300 milliGRAM(s) Oral two times a day with meals  voriconazole 200 milliGRAM(s) Oral every 12 hours    MEDICATIONS  (PRN):  acetaminophen   Tablet .. 650 milliGRAM(s) Oral every 6 hours PRN Temp greater or equal to 38C (100.4F), Mild Pain (1 - 3)  acetaminophen  IVPB .. 1000 milliGRAM(s) IV Intermittent once PRN Temp greater or equal to 38.5C (101.3F)  diphenhydrAMINE   Injectable 25 milliGRAM(s) IV Push every 4 hours PRN Allergy symptoms  metoclopramide Injectable 10 milliGRAM(s) IV Push every 6 hours PRN Nausea and/or Vomiting  ondansetron Injectable 8 milliGRAM(s) IV Push every 8 hours PRN Nausea and/or Vomiting  sodium chloride 0.9% lock flush 10 milliLiter(s) IV Push every 1 hour PRN Pre/post blood products, medications, blood draw, and to maintain line patency      Allergies    No Known Allergies    Intolerances        Vital Signs Last 24 Hrs  T(C): 36.6 (22 Oct 2019 05:06), Max: 36.8 (21 Oct 2019 17:35)  T(F): 97.9 (22 Oct 2019 05:06), Max: 98.3 (21 Oct 2019 17:35)  HR: 97 (22 Oct 2019 05:06) (87 - 97)  BP: 113/75 (22 Oct 2019 05:06) (113/75 - 137/73)  BP(mean): --  RR: 18 (22 Oct 2019 05:06) (18 - 18)  SpO2: 99% (22 Oct 2019 05:06) (97% - 100%)    I&O's Summary    20 Oct 2019 07:01  -  21 Oct 2019 07:00  --------------------------------------------------------  IN: 2036 mL / OUT: 1400 mL / NET: 636 mL    21 Oct 2019 07:01  -  22 Oct 2019 06:44  --------------------------------------------------------  IN: 599 mL / OUT: 975 mL / NET: -376 mL        ON EXAM:    General: NAD, awake and alert, oriented x 3  HEENT: Mucous membranes are moist, anicteric  Lungs: Non-labored, breath sounds are clear bilaterally, No wheezing, rales or rhonchi  Cardiovascular: Regular, S1 and S2, no murmurs, rubs, or gallops  Gastrointestinal: Bowel Sounds present, soft, nontender.   Lymph: No peripheral edema. No lymphadenopathy.  Skin: No rashes or ulcers  Psych:  Mood & affect appropriate    LABS: All Labs Reviewed:                        7.0    <0.10 )-----------( 14       ( 21 Oct 2019 07:11 )             20.2                         6.8    <0.10 )-----------( 8        ( 20 Oct 2019 07:05 )             20.4                         7.6    <0.10 )-----------( 12       ( 19 Oct 2019 07:01 )             22.3     21 Oct 2019 07:10    140    |  106    |  19     ----------------------------<  108    4.5     |  23     |  0.84   20 Oct 2019 07:05    141    |  105    |  18     ----------------------------<  103    4.3     |  23     |  0.80   19 Oct 2019 07:01    141    |  105    |  17     ----------------------------<  101    4.0     |  23     |  0.80     Ca    8.8        21 Oct 2019 07:10  Ca    8.9        20 Oct 2019 07:05  Ca    9.3        19 Oct 2019 07:01  Phos  3.6       21 Oct 2019 07:10  Phos  3.3       20 Oct 2019 07:05  Phos  3.2       19 Oct 2019 07:01  Mg     1.8       21 Oct 2019 07:10  Mg     1.5       20 Oct 2019 07:05  Mg     1.7       19 Oct 2019 07:01    TPro  5.6    /  Alb  3.4    /  TBili  0.4    /  DBili  0.1    /  AST  8      /  ALT  9      /  AlkPhos  62     21 Oct 2019 07:10  TPro  5.8    /  Alb  3.3    /  TBili  0.4    /  DBili  x      /  AST  8      /  ALT  10     /  AlkPhos  62     20 Oct 2019 07:05  TPro  5.9    /  Alb  3.5    /  TBili  0.6    /  DBili  x      /  AST  11     /  ALT  14     /  AlkPhos  62     19 Oct 2019 07:01    PT/INR - ( 21 Oct 2019 07:11 )   PT: 14.4 sec;   INR: 1.24 ratio         PTT - ( 21 Oct 2019 07:11 )  PTT:30.5 sec      Blood Culture:

## 2019-10-22 NOTE — PROGRESS NOTE ADULT - SUBJECTIVE AND OBJECTIVE BOX
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    S: Patient seen and examined with HPC Transplant Team:     Denies mouth / tongue / throat pain, dyspnea, cough, nausea, vomiting, diarrhea, abdominal pain     O: Vitals:   Vital Signs Last 24 Hrs  T(C): 36.6 (22 Oct 2019 05:06), Max: 36.8 (21 Oct 2019 17:35)  T(F): 97.9 (22 Oct 2019 05:06), Max: 98.3 (21 Oct 2019 17:35)  HR: 97 (22 Oct 2019 05:06) (87 - 97)  BP: 113/75 (22 Oct 2019 05:06) (113/75 - 137/73)  BP(mean): --  RR: 18 (22 Oct 2019 05:06) (18 - 18)  SpO2: 99% (22 Oct 2019 05:06) (97% - 100%)    Admit weight: 81.5 kg  Daily Weight in k.6 (21 Oct 2019 09:06)  Today's weight:     Intake / Output:   10- @ 07:01  -  10-22 @ 07:00  --------------------------------------------------------  IN: 931 mL / OUT: 975 mL / NET: -44 mL      PE:   Oropharynx: no ulceration or erythema  Oral Mucositis:   -                                             Grade: -  CVS: RRR, normal S1 S2  Lungs: CTA bilaterally   Abdomen: soft, + normoactive BS, NT, ND  Extremities: warm, no edema. Toe amputation  Gastric Mucositis:     -                                             Grade: -  Intestinal Mucositis:      -                                        Grade: -  Skin: patchy erythematous rash back and upper chest  TLC:  C/D/I  Neuro: Alert and oriented x 3  Pain: none       Labs:   CBC Full  -  ( 22 Oct 2019 07:35 )  WBC Count : <0.10 K/uL  Hemoglobin : 6.6 g/dL  Hematocrit : 19.9 %  Platelet Count - Automated : 8 K/uL  Mean Cell Volume : 88.8 fl  Mean Cell Hemoglobin : 29.5 pg  Mean Cell Hemoglobin Concentration : 33.2 gm/dL  Auto Neutrophil # : x  Auto Lymphocyte # : x  Auto Monocyte # : x  Auto Eosinophil # : x  Auto Basophil # : x  Auto Neutrophil % : x  Auto Lymphocyte % : x  Auto Monocyte % : x  Auto Eosinophil % : x  Auto Basophil % : x                          6.6    <0.10 )-----------( 8        ( 22 Oct 2019 07:35 )             19.9     10-22    138  |  103  |  18  ----------------------------<  103<H>  4.4   |  25  |  0.86    Ca    9.2      22 Oct 2019 07:28  Phos  4.1     10-22  Mg     1.6     10-22    TPro  5.5<L>  /  Alb  3.3  /  TBili  0.4  /  DBili  x   /  AST  12  /  ALT  10  /  AlkPhos  65  10-22    PT/INR - ( 21 Oct 2019 07:11 )   PT: 14.4 sec;   INR: 1.24 ratio         PTT - ( 21 Oct 2019 07:11 )  PTT:30.5 sec  LIVER FUNCTIONS - ( 22 Oct 2019 07:28 )  Alb: 3.3 g/dL / Pro: 5.5 g/dL / ALK PHOS: 65 U/L / ALT: 10 U/L / AST: 12 U/L / GGT: x           Lactate Dehydrogenase, Serum: 97 U/L (10-22 @ 07:28)       10-21 @ 09:04  Tacrolimus 4.9                Cyclosporine --    Cultures:   Culture Results: urine  No growth (10.13.19 @ 04:27)    Culture Results: blood  No growth at 5 days. (10.12.19 @ 22:28)    Culture Results: blood  No growth at 5 days. (10.12.19 @ 22:28)    Culture Results: urine  <10,000 CFU/mL Normal Urogenital Pretty (10.10.19 @ 19:07)    Culture Results: blood   No growth at 5 days. (10.10.19 @ 17:16)      Radiology:   EXAM:  XR CHEST PORTABLE ROUTINE 1V                        PROCEDURE DATE:  10/12/2019    IMPRESSION: Clear lungs.Meds:     Antimicrobials:   acyclovir   Oral Tab/Cap 400 milliGRAM(s) Oral every 8 hours  cefepime   IVPB 2000 milliGRAM(s) IV Intermittent every 8 hours  clotrimazole Lozenge 1 Lozenge Oral five times a day  voriconazole 200 milliGRAM(s) Oral every 12 hours      Heme / Onc:   heparin  Infusion 339 Unit(s)/Hr IV Continuous <Continuous>      GI:  docusate sodium 100 milliGRAM(s) Oral three times a day  pantoprazole    Tablet 40 milliGRAM(s) Oral before breakfast  senna 2 Tablet(s) Oral at bedtime  sodium bicarbonate Mouth Rinse 10 milliLiter(s) Swish and Spit five times a day  ursodiol Capsule 300 milliGRAM(s) Oral two times a day with meals      Cardiovascular:   enalapril 2.5 milliGRAM(s) Oral daily  metoprolol succinate ER 12.5 milliGRAM(s) Oral two times a day  tamsulosin 0.4 milliGRAM(s) Oral at bedtime      Immunologic:   filgrastim-sndz Injectable 480 MICROGram(s) SubCutaneous daily  immune   globulin 10% (GAMMAGARD) IVPB 30 Gram(s) IV Intermittent <User Schedule>  mycophenolate mofetil 1000 milliGRAM(s) Oral <User Schedule>  tacrolimus 1.5 milliGRAM(s) Oral two times a day      Other medications:   acetaminophen   Tablet .. 650 milliGRAM(s) Oral every 6 hours  acetaminophen   Tablet .. 650 milliGRAM(s) Oral <User Schedule>  ALPRAZolam 0.5 milliGRAM(s) Oral at bedtime  Biotene Dry Mouth Oral Rinse 5 milliLiter(s) Swish and Spit five times a day  chlorhexidine 4% Liquid 1 Application(s) Topical <User Schedule>  diphenhydrAMINE   Injectable 25 milliGRAM(s) IV Push <User Schedule>  finasteride 5 milliGRAM(s) Oral daily  folic acid 1 milliGRAM(s) Oral daily  lidocaine/prilocaine Cream 1 Application(s) Topical daily  magnesium oxide 400 milliGRAM(s) Oral three times a day with meals  multivitamin 1 Tablet(s) Oral daily  sertraline 50 milliGRAM(s) Oral at bedtime  sodium chloride 0.9%. 1000 milliLiter(s) IV Continuous <Continuous>      PRN:   acetaminophen   Tablet .. 650 milliGRAM(s) Oral every 6 hours PRN  acetaminophen  IVPB .. 1000 milliGRAM(s) IV Intermittent once PRN  diphenhydrAMINE   Injectable 25 milliGRAM(s) IV Push every 4 hours PRN  metoclopramide Injectable 10 milliGRAM(s) IV Push every 6 hours PRN  ondansetron Injectable 8 milliGRAM(s) IV Push every 8 hours PRN  sodium chloride 0.9% lock flush 10 milliLiter(s) IV Push every 1 hour PRN    A/P: 59 year old male with a history of AML  Post Allogeneic PBSCT day +13  Strict I&O, daily weights, prn diuresis   Cardiology following   10/14- Start Tacro, Cellcept and Zarxio  Afebrile, neutropenic - continue Cefepime     1. Infectious Disease:   acyclovir   Oral Tab/Cap 400 milliGRAM(s) Oral every 8 hours  cefepime   IVPB 2000 milliGRAM(s) IV Intermittent every 8 hours  clotrimazole Lozenge 1 Lozenge Oral five times a day  voriconazole 200 milliGRAM(s) Oral every 12 hours    2. VOD Prophylaxis: Actigall, Glutamine, Heparin (dosed at 100 units / kg / day)     3. GI Prophylaxis:   pantoprazole    Tablet 40 milliGRAM(s) Oral before breakfast    4. Mouthcare - NS / NaHCO3 rinses, Mycelex, Biotene; Skin care     5. GVHD prophylaxis   mycophenolate mofetil 1000 milliGRAM(s) Oral <User Schedule>  tacrolimus 1.5 milliGRAM(s) Oral two times a day    6. Transfuse & replete electrolytes prn   magnesium oxide 400 milliGRAM(s) Oral three times a day with meals    7. IV hydration, daily weights, strict I&O, prn diuresis     8. PO intake as tolerated, nutrition follow up as needed, MVI, folic acid     9. Antiemetics, anti-diarrhea medications:   metoclopramide Injectable 10 milliGRAM(s) IV Push every 6 hours PRN  ondansetron Injectable 8 milliGRAM(s) IV Push every 8 hours PRN    10. OOB as tolerated, physical therapy consult if needed     11. Monitor coags / fibrinogen 2x week, vitamin K as needed     12. Monitor closely for clinical changes, monitor for fevers     13. Emotional support provided, plan of care discussed and questions addressed     14. Patient education done regarding plan of care, restrictions and discharge planning     15. Continue regular social work input     I have written the above note for Dr. Quiñones who performed service with me in the room.   Susan Grey NP-C ([phone])    I have seen and examined patient with NP, I agree with above note as scribed. 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    S: Patient seen and examined with HPC Transplant Team:   + platelet reaction this AM - hives   + rash - likely residual from [place]   Denies mouth / tongue / throat pain, dyspnea, cough, nausea, vomiting, diarrhea, abdominal pain     O: Vitals:   Vital Signs Last 24 Hrs  T(C): 36.6 (22 Oct 2019 05:06), Max: 36.8 (21 Oct 2019 17:35)  T(F): 97.9 (22 Oct 2019 05:06), Max: 98.3 (21 Oct 2019 17:35)  HR: 97 (22 Oct 2019 05:06) (87 - 97)  BP: 113/75 (22 Oct 2019 05:06) (113/75 - 137/73)  BP(mean): --  RR: 18 (22 Oct 2019 05:06) (18 - 18)  SpO2: 99% (22 Oct 2019 05:06) (97% - 100%)    Admit weight: 81.5 kg  Daily Weight in k.6 (21 Oct 2019 09:06)  Today's weight: 78.9kg     Intake / Output:   10-21 @ 07:01  -  10-22 @ 07:00  --------------------------------------------------------  IN: 931 mL / OUT: 975 mL / NET: -44 mL      PE:   Oropharynx: no ulceration or erythema  Oral Mucositis:   -                                             Grade: -  CVS: RRR, normal S1 S2  Lungs: CTA bilaterally   Abdomen: soft, + normoactive BS, NT, ND  Extremities: warm, no edema. Toe amputation  Gastric Mucositis:     -                                             Grade: -  Intestinal Mucositis:      -                                        Grade: -  Skin: patchy erythematous rash back and upper chest  TLC:  C/D/I  Neuro: Alert and oriented x 3  Pain: none       Labs:   CBC Full  -  ( 22 Oct 2019 07:35 )  WBC Count : <0.10 K/uL  Hemoglobin : 6.6 g/dL  Hematocrit : 19.9 %  Platelet Count - Automated : 8 K/uL  Mean Cell Volume : 88.8 fl  Mean Cell Hemoglobin : 29.5 pg  Mean Cell Hemoglobin Concentration : 33.2 gm/dL  Auto Neutrophil # : x  Auto Lymphocyte # : x  Auto Monocyte # : x  Auto Eosinophil # : x  Auto Basophil # : x  Auto Neutrophil % : x  Auto Lymphocyte % : x  Auto Monocyte % : x  Auto Eosinophil % : x  Auto Basophil % : x                          6.6    <0.10 )-----------( 8        ( 22 Oct 2019 07:35 )             19.9     10-22    138  |  103  |  18  ----------------------------<  103<H>  4.4   |  25  |  0.86    Ca    9.2      22 Oct 2019 07:28  Phos  4.1     10-22  Mg     1.6     10-22    TPro  5.5<L>  /  Alb  3.3  /  TBili  0.4  /  DBili  x   /  AST  12  /  ALT  10  /  AlkPhos  65  10-22    PT/INR - ( 21 Oct 2019 07:11 )   PT: 14.4 sec;   INR: 1.24 ratio         PTT - ( 21 Oct 2019 07:11 )  PTT:30.5 sec  LIVER FUNCTIONS - ( 22 Oct 2019 07:28 )  Alb: 3.3 g/dL / Pro: 5.5 g/dL / ALK PHOS: 65 U/L / ALT: 10 U/L / AST: 12 U/L / GGT: x           Lactate Dehydrogenase, Serum: 97 U/L (10-22 @ 07:28)       10-21 @ 09:04  Tacrolimus 4.9                Cyclosporine --    Cultures:   Culture Results: urine  No growth (10.13.19 @ 04:27)    Culture Results: blood  No growth at 5 days. (10.12.19 @ 22:28)    Culture Results: blood  No growth at 5 days. (10.12.19 @ 22:28)    Culture Results: urine  <10,000 CFU/mL Normal Urogenital Pretty (10.10.19 @ 19:07)    Culture Results: blood   No growth at 5 days. (10.10.19 @ 17:16)      Radiology:   EXAM:  XR CHEST PORTABLE ROUTINE 1V                        PROCEDURE DATE:  10/12/2019    IMPRESSION: Clear lungs.Meds:     Antimicrobials:   acyclovir   Oral Tab/Cap 400 milliGRAM(s) Oral every 8 hours  cefepime   IVPB 2000 milliGRAM(s) IV Intermittent every 8 hours  clotrimazole Lozenge 1 Lozenge Oral five times a day  voriconazole 200 milliGRAM(s) Oral every 12 hours      Heme / Onc:   heparin  Infusion 339 Unit(s)/Hr IV Continuous <Continuous>      GI:  docusate sodium 100 milliGRAM(s) Oral three times a day  pantoprazole    Tablet 40 milliGRAM(s) Oral before breakfast  senna 2 Tablet(s) Oral at bedtime  sodium bicarbonate Mouth Rinse 10 milliLiter(s) Swish and Spit five times a day  ursodiol Capsule 300 milliGRAM(s) Oral two times a day with meals      Cardiovascular:   enalapril 2.5 milliGRAM(s) Oral daily  metoprolol succinate ER 12.5 milliGRAM(s) Oral two times a day  tamsulosin 0.4 milliGRAM(s) Oral at bedtime      Immunologic:   filgrastim-sndz Injectable 480 MICROGram(s) SubCutaneous daily  immune   globulin 10% (GAMMAGARD) IVPB 30 Gram(s) IV Intermittent <User Schedule>  mycophenolate mofetil 1000 milliGRAM(s) Oral <User Schedule>  tacrolimus 1.5 milliGRAM(s) Oral two times a day      Other medications:   acetaminophen   Tablet .. 650 milliGRAM(s) Oral every 6 hours  acetaminophen   Tablet .. 650 milliGRAM(s) Oral <User Schedule>  ALPRAZolam 0.5 milliGRAM(s) Oral at bedtime  Biotene Dry Mouth Oral Rinse 5 milliLiter(s) Swish and Spit five times a day  chlorhexidine 4% Liquid 1 Application(s) Topical <User Schedule>  diphenhydrAMINE   Injectable 25 milliGRAM(s) IV Push <User Schedule>  finasteride 5 milliGRAM(s) Oral daily  folic acid 1 milliGRAM(s) Oral daily  lidocaine/prilocaine Cream 1 Application(s) Topical daily  magnesium oxide 400 milliGRAM(s) Oral three times a day with meals  multivitamin 1 Tablet(s) Oral daily  sertraline 50 milliGRAM(s) Oral at bedtime  sodium chloride 0.9%. 1000 milliLiter(s) IV Continuous <Continuous>      PRN:   acetaminophen   Tablet .. 650 milliGRAM(s) Oral every 6 hours PRN  acetaminophen  IVPB .. 1000 milliGRAM(s) IV Intermittent once PRN  diphenhydrAMINE   Injectable 25 milliGRAM(s) IV Push every 4 hours PRN  metoclopramide Injectable 10 milliGRAM(s) IV Push every 6 hours PRN  ondansetron Injectable 8 milliGRAM(s) IV Push every 8 hours PRN  sodium chloride 0.9% lock flush 10 milliLiter(s) IV Push every 1 hour PRN    A/P: 59 year old male with a history of AML  Post Allogeneic PBSCT day +13  Strict I&O, daily weights, prn diuresis   Cardiology following   10/14- Start Tacro, Cellcept and Zarxio  Afebrile, neutropenic - continue Cefepime     1. Infectious Disease:   acyclovir   Oral Tab/Cap 400 milliGRAM(s) Oral every 8 hours  cefepime   IVPB 2000 milliGRAM(s) IV Intermittent every 8 hours  clotrimazole Lozenge 1 Lozenge Oral five times a day  voriconazole 200 milliGRAM(s) Oral every 12 hours    2. VOD Prophylaxis: Actigall, Glutamine, Heparin (dosed at 100 units / kg / day)     3. GI Prophylaxis:   pantoprazole    Tablet 40 milliGRAM(s) Oral before breakfast    4. Mouthcare - NS / NaHCO3 rinses, Mycelex, Biotene; Skin care     5. GVHD prophylaxis   mycophenolate mofetil 1000 milliGRAM(s) Oral <User Schedule>  tacrolimus 1.5 milliGRAM(s) Oral two times a day    6. Transfuse & replete electrolytes prn   magnesium oxide 400 milliGRAM(s) Oral three times a day with meals    7. IV hydration, daily weights, strict I&O, prn diuresis     8. PO intake as tolerated, nutrition follow up as needed, MVI, folic acid     9. Antiemetics, anti-diarrhea medications:   metoclopramide Injectable 10 milliGRAM(s) IV Push every 6 hours PRN  ondansetron Injectable 8 milliGRAM(s) IV Push every 8 hours PRN    10. OOB as tolerated, physical therapy consult if needed     11. Monitor coags / fibrinogen 2x week, vitamin K as needed     12. Monitor closely for clinical changes, monitor for fevers     13. Emotional support provided, plan of care discussed and questions addressed     14. Patient education done regarding plan of care, restrictions and discharge planning     15. Continue regular social work input     I have written the above note for Dr. Quiñones who performed service with me in the room.   Susan Grey NP-C ([phone])    I have seen and examined patient with NP, I agree with above note as scribed.

## 2019-10-22 NOTE — PROGRESS NOTE ADULT - ASSESSMENT
59 year old male, with previously diagnosed acute myeloid leukemia. s/p induction chemotherapy with Daunorubicin/Cytarabine and HIDAC consolidation chemotherapy, now admitted for Haplo-identical stem cell transplant from son:    - No evidence of volume overload at present.  He is on fluids and has been getting IV Lasix prn to maintain net negative.  His recent CXR is clear.  - Continue to maintain strict I's and O's, and to monitor for signs of volume overload, which he is at risk for.  - No evidence of acute ischemia  - BP is overall stable  - Continue ACEI  and BB at current doses as tolerated by BP.    - On heparin gtt for hx of dvt/pe and hypercoagulable state, and adjust rate per protocol. Monitor platelet count which is now 14.  No bleeding, though is clearly at risk.  - Monitor and replete lytes  - To follow while admitted

## 2019-10-23 LAB
ALBUMIN SERPL ELPH-MCNC: 3.2 G/DL — LOW (ref 3.3–5)
ALP SERPL-CCNC: 63 U/L — SIGNIFICANT CHANGE UP (ref 40–120)
ALT FLD-CCNC: 7 U/L — LOW (ref 10–45)
ANION GAP SERPL CALC-SCNC: 11 MMOL/L — SIGNIFICANT CHANGE UP (ref 5–17)
AST SERPL-CCNC: 10 U/L — SIGNIFICANT CHANGE UP (ref 10–40)
BILIRUB DIRECT SERPL-MCNC: <0.1 MG/DL — SIGNIFICANT CHANGE UP (ref 0–0.2)
BILIRUB INDIRECT FLD-MCNC: >0.3 MG/DL — SIGNIFICANT CHANGE UP (ref 0.2–1)
BILIRUB SERPL-MCNC: 0.4 MG/DL — SIGNIFICANT CHANGE UP (ref 0.2–1.2)
BLD GP AB SCN SERPL QL: NEGATIVE — SIGNIFICANT CHANGE UP
BUN SERPL-MCNC: 19 MG/DL — SIGNIFICANT CHANGE UP (ref 7–23)
CALCIUM SERPL-MCNC: 8.9 MG/DL — SIGNIFICANT CHANGE UP (ref 8.4–10.5)
CHLORIDE SERPL-SCNC: 107 MMOL/L — SIGNIFICANT CHANGE UP (ref 96–108)
CO2 SERPL-SCNC: 24 MMOL/L — SIGNIFICANT CHANGE UP (ref 22–31)
CREAT SERPL-MCNC: 0.9 MG/DL — SIGNIFICANT CHANGE UP (ref 0.5–1.3)
GLUCOSE SERPL-MCNC: 99 MG/DL — SIGNIFICANT CHANGE UP (ref 70–99)
HCT VFR BLD CALC: 21 % — CRITICAL LOW (ref 39–50)
HGB BLD-MCNC: 7.2 G/DL — LOW (ref 13–17)
LDH SERPL L TO P-CCNC: 98 U/L — SIGNIFICANT CHANGE UP (ref 50–242)
MAGNESIUM SERPL-MCNC: 1.5 MG/DL — LOW (ref 1.6–2.6)
MCHC RBC-ENTMCNC: 29.9 PG — SIGNIFICANT CHANGE UP (ref 27–34)
MCHC RBC-ENTMCNC: 34.3 GM/DL — SIGNIFICANT CHANGE UP (ref 32–36)
MCV RBC AUTO: 87.1 FL — SIGNIFICANT CHANGE UP (ref 80–100)
NRBC # BLD: 0 /100 WBCS — SIGNIFICANT CHANGE UP (ref 0–0)
PHOSPHATE SERPL-MCNC: 3.7 MG/DL — SIGNIFICANT CHANGE UP (ref 2.5–4.5)
PLATELET # BLD AUTO: 17 K/UL — CRITICAL LOW (ref 150–400)
POTASSIUM SERPL-MCNC: 4.1 MMOL/L — SIGNIFICANT CHANGE UP (ref 3.5–5.3)
POTASSIUM SERPL-SCNC: 4.1 MMOL/L — SIGNIFICANT CHANGE UP (ref 3.5–5.3)
PROT SERPL-MCNC: 5.4 G/DL — LOW (ref 6–8.3)
RBC # BLD: 2.41 M/UL — LOW (ref 4.2–5.8)
RBC # FLD: 17.5 % — HIGH (ref 10.3–14.5)
RH IG SCN BLD-IMP: POSITIVE — SIGNIFICANT CHANGE UP
SODIUM SERPL-SCNC: 142 MMOL/L — SIGNIFICANT CHANGE UP (ref 135–145)
WBC # BLD: <0.1 K/UL — CRITICAL LOW (ref 3.8–10.5)
WBC # FLD AUTO: <0.1 K/UL — CRITICAL LOW (ref 3.8–10.5)

## 2019-10-23 PROCEDURE — 99291 CRITICAL CARE FIRST HOUR: CPT

## 2019-10-23 PROCEDURE — 99232 SBSQ HOSP IP/OBS MODERATE 35: CPT

## 2019-10-23 RX ORDER — MAGNESIUM SULFATE 500 MG/ML
2 VIAL (ML) INJECTION ONCE
Refills: 0 | Status: COMPLETED | OUTPATIENT
Start: 2019-10-23 | End: 2019-10-23

## 2019-10-23 RX ADMIN — Medication 2.5 MILLIGRAM(S): at 06:44

## 2019-10-23 RX ADMIN — Medication 12.5 MILLIGRAM(S): at 06:44

## 2019-10-23 RX ADMIN — MYCOPHENOLATE MOFETIL 1000 MILLIGRAM(S): 250 CAPSULE ORAL at 13:20

## 2019-10-23 RX ADMIN — PANTOPRAZOLE SODIUM 40 MILLIGRAM(S): 20 TABLET, DELAYED RELEASE ORAL at 06:44

## 2019-10-23 RX ADMIN — Medication 5 MILLILITER(S): at 20:09

## 2019-10-23 RX ADMIN — Medication 400 MILLIGRAM(S): at 13:20

## 2019-10-23 RX ADMIN — Medication 1 LOZENGE: at 17:09

## 2019-10-23 RX ADMIN — TACROLIMUS 1.5 MILLIGRAM(S): 5 CAPSULE ORAL at 17:11

## 2019-10-23 RX ADMIN — Medication 5 MILLILITER(S): at 23:59

## 2019-10-23 RX ADMIN — Medication 10 MILLILITER(S): at 12:58

## 2019-10-23 RX ADMIN — MAGNESIUM OXIDE 400 MG ORAL TABLET 400 MILLIGRAM(S): 241.3 TABLET ORAL at 08:42

## 2019-10-23 RX ADMIN — FINASTERIDE 5 MILLIGRAM(S): 5 TABLET, FILM COATED ORAL at 13:21

## 2019-10-23 RX ADMIN — TACROLIMUS 1.5 MILLIGRAM(S): 5 CAPSULE ORAL at 06:44

## 2019-10-23 RX ADMIN — Medication 1 LOZENGE: at 12:58

## 2019-10-23 RX ADMIN — Medication 10 MILLILITER(S): at 17:09

## 2019-10-23 RX ADMIN — VORICONAZOLE 200 MILLIGRAM(S): 10 INJECTION, POWDER, LYOPHILIZED, FOR SOLUTION INTRAVENOUS at 17:09

## 2019-10-23 RX ADMIN — URSODIOL 300 MILLIGRAM(S): 250 TABLET, FILM COATED ORAL at 17:10

## 2019-10-23 RX ADMIN — MYCOPHENOLATE MOFETIL 1000 MILLIGRAM(S): 250 CAPSULE ORAL at 08:42

## 2019-10-23 RX ADMIN — Medication 1 LOZENGE: at 08:41

## 2019-10-23 RX ADMIN — Medication 400 MILLIGRAM(S): at 20:13

## 2019-10-23 RX ADMIN — MAGNESIUM OXIDE 400 MG ORAL TABLET 400 MILLIGRAM(S): 241.3 TABLET ORAL at 12:59

## 2019-10-23 RX ADMIN — Medication 1 LOZENGE: at 00:21

## 2019-10-23 RX ADMIN — VORICONAZOLE 200 MILLIGRAM(S): 10 INJECTION, POWDER, LYOPHILIZED, FOR SOLUTION INTRAVENOUS at 06:44

## 2019-10-23 RX ADMIN — SERTRALINE 50 MILLIGRAM(S): 25 TABLET, FILM COATED ORAL at 20:14

## 2019-10-23 RX ADMIN — URSODIOL 300 MILLIGRAM(S): 250 TABLET, FILM COATED ORAL at 08:42

## 2019-10-23 RX ADMIN — Medication 12.5 MILLIGRAM(S): at 17:13

## 2019-10-23 RX ADMIN — CHLORHEXIDINE GLUCONATE 1 APPLICATION(S): 213 SOLUTION TOPICAL at 12:44

## 2019-10-23 RX ADMIN — Medication 5 MILLILITER(S): at 12:58

## 2019-10-23 RX ADMIN — Medication 480 MICROGRAM(S): at 17:09

## 2019-10-23 RX ADMIN — TAMSULOSIN HYDROCHLORIDE 0.4 MILLIGRAM(S): 0.4 CAPSULE ORAL at 20:56

## 2019-10-23 RX ADMIN — Medication 10 MILLILITER(S): at 20:09

## 2019-10-23 RX ADMIN — CEFEPIME 100 MILLIGRAM(S): 1 INJECTION, POWDER, FOR SOLUTION INTRAMUSCULAR; INTRAVENOUS at 13:20

## 2019-10-23 RX ADMIN — Medication 400 MILLIGRAM(S): at 06:44

## 2019-10-23 RX ADMIN — MYCOPHENOLATE MOFETIL 1000 MILLIGRAM(S): 250 CAPSULE ORAL at 20:10

## 2019-10-23 RX ADMIN — Medication 10 MILLILITER(S): at 08:41

## 2019-10-23 RX ADMIN — Medication 50 GRAM(S): at 08:41

## 2019-10-23 RX ADMIN — CEFEPIME 100 MILLIGRAM(S): 1 INJECTION, POWDER, FOR SOLUTION INTRAMUSCULAR; INTRAVENOUS at 20:14

## 2019-10-23 RX ADMIN — Medication 5 MILLILITER(S): at 08:41

## 2019-10-23 RX ADMIN — CEFEPIME 100 MILLIGRAM(S): 1 INJECTION, POWDER, FOR SOLUTION INTRAMUSCULAR; INTRAVENOUS at 06:45

## 2019-10-23 RX ADMIN — Medication 5 MILLILITER(S): at 00:21

## 2019-10-23 RX ADMIN — Medication 10 MILLILITER(S): at 00:21

## 2019-10-23 RX ADMIN — Medication 1 MILLIGRAM(S): at 12:58

## 2019-10-23 RX ADMIN — Medication 1 TABLET(S): at 12:58

## 2019-10-23 RX ADMIN — Medication 1 LOZENGE: at 20:09

## 2019-10-23 RX ADMIN — Medication 5 MILLILITER(S): at 17:09

## 2019-10-23 RX ADMIN — Medication 1 MILLIGRAM(S): at 20:55

## 2019-10-23 RX ADMIN — MAGNESIUM OXIDE 400 MG ORAL TABLET 400 MILLIGRAM(S): 241.3 TABLET ORAL at 17:09

## 2019-10-23 NOTE — PROGRESS NOTE ADULT - SUBJECTIVE AND OBJECTIVE BOX
HPC Transplant Team                                                      Critical / Counseling Time Provided: 30 minutes                                                                                                                                                        Chief Complaint:     S: Patient seen and examined with HPC Transplant Team:   Denies mouth / tongue / throat pain, dyspnea, cough, nausea, vomiting, diarrhea, abdominal pain     O: Vitals:   Vital Signs Last 24 Hrs  T(C): 36.6 (23 Oct 2019 06:23), Max: 36.8 (22 Oct 2019 17:50)  T(F): 97.9 (23 Oct 2019 06:23), Max: 98.3 (22 Oct 2019 17:50)  HR: 88 (23 Oct 2019 06:23) (81 - 95)  BP: 120/76 (23 Oct 2019 06:23) (116/77 - 145/82)  BP(mean): --  RR: 18 (23 Oct 2019 06:23) (18 - 18)  SpO2: 97% (22 Oct 2019 21:35) (97% - 100%)    Admit weight:   Daily     Daily Weight in k.9 (22 Oct 2019 11:04)    Intake / Output:   10-22 @ 07:01  -  10-23 @ 07:00  --------------------------------------------------------  IN: 1995 mL / OUT: 1675 mL / NET: 320 mL          PE:   Oropharynx:   Oral Mucositis:                                                        Grade:   CVS:   Lungs:   Abdomen:  Extremities:   Gastric Mucositis:                                                  Grade:   Intestinal Mucositis:                                              Grade:   Skin:   TLC:   Neuro:   Pain:     Labs:   CBC Full  -  ( 22 Oct 2019 07:35 )  WBC Count : <0.10 K/uL  Hemoglobin : 6.6 g/dL  Hematocrit : 19.9 %  Platelet Count - Automated : 8 K/uL  Mean Cell Volume : 88.8 fl  Mean Cell Hemoglobin : 29.5 pg  Mean Cell Hemoglobin Concentration : 33.2 gm/dL  Auto Neutrophil # : x  Auto Lymphocyte # : x  Auto Monocyte # : x  Auto Eosinophil # : x  Auto Basophil # : x  Auto Neutrophil % : x  Auto Lymphocyte % : x  Auto Monocyte % : x  Auto Eosinophil % : x  Auto Basophil % : x                          6.6    <0.10 )-----------( 8        ( 22 Oct 2019 07:35 )             19.9     10-22    138  |  103  |  18  ----------------------------<  103<H>  4.4   |  25  |  0.86    Ca    9.2      22 Oct 2019 07:28  Phos  4.1     10-22  Mg     1.6     10-22    TPro  5.5<L>  /  Alb  3.3  /  TBili  0.4  /  DBili  x   /  AST  12  /  ALT  10  /  AlkPhos  65  10-22      LIVER FUNCTIONS - ( 22 Oct 2019 07:28 )  Alb: 3.3 g/dL / Pro: 5.5 g/dL / ALK PHOS: 65 U/L / ALT: 10 U/L / AST: 12 U/L / GGT: x                   Karnofsky / Lansky Scale:   GVHD:   Skin:   Liver:   Gut:   Overall Grade:       Cultures:         Radiology:       Meds:   Antimicrobials:   acyclovir   Oral Tab/Cap 400 milliGRAM(s) Oral every 8 hours  cefepime   IVPB 2000 milliGRAM(s) IV Intermittent every 8 hours  clotrimazole Lozenge 1 Lozenge Oral five times a day  voriconazole 200 milliGRAM(s) Oral every 12 hours      Heme / Onc:   heparin  Infusion 339 Unit(s)/Hr IV Continuous <Continuous>      GI:  docusate sodium 100 milliGRAM(s) Oral three times a day  pantoprazole    Tablet 40 milliGRAM(s) Oral before breakfast  senna 2 Tablet(s) Oral at bedtime  sodium bicarbonate Mouth Rinse 10 milliLiter(s) Swish and Spit five times a day  ursodiol Capsule 300 milliGRAM(s) Oral two times a day with meals      Cardiovascular:   enalapril 2.5 milliGRAM(s) Oral daily  metoprolol succinate ER 12.5 milliGRAM(s) Oral two times a day  tamsulosin 0.4 milliGRAM(s) Oral at bedtime      Immunologic:   filgrastim-sndz Injectable 480 MICROGram(s) SubCutaneous daily  immune   globulin 10% (GAMMAGARD) IVPB 30 Gram(s) IV Intermittent <User Schedule>  mycophenolate mofetil 1000 milliGRAM(s) Oral <User Schedule>  tacrolimus 1.5 milliGRAM(s) Oral two times a day      Other medications:   acetaminophen   Tablet .. 650 milliGRAM(s) Oral every 6 hours  acetaminophen   Tablet .. 650 milliGRAM(s) Oral <User Schedule>  ALPRAZolam 1 milliGRAM(s) Oral at bedtime  Biotene Dry Mouth Oral Rinse 5 milliLiter(s) Swish and Spit five times a day  chlorhexidine 4% Liquid 1 Application(s) Topical <User Schedule>  diphenhydrAMINE   Injectable 25 milliGRAM(s) IV Push <User Schedule>  finasteride 5 milliGRAM(s) Oral daily  folic acid 1 milliGRAM(s) Oral daily  lidocaine/prilocaine Cream 1 Application(s) Topical daily  magnesium oxide 400 milliGRAM(s) Oral three times a day with meals  multivitamin 1 Tablet(s) Oral daily  sertraline 50 milliGRAM(s) Oral at bedtime  sodium chloride 0.9%. 1000 milliLiter(s) IV Continuous <Continuous>      PRN:   acetaminophen   Tablet .. 650 milliGRAM(s) Oral every 6 hours PRN  acetaminophen  IVPB .. 1000 milliGRAM(s) IV Intermittent once PRN  diphenhydrAMINE   Injectable 25 milliGRAM(s) IV Push every 4 hours PRN  hydrocortisone sodium succinate Injectable 50 milliGRAM(s) IV Push once PRN  metoclopramide Injectable 10 milliGRAM(s) IV Push every 6 hours PRN  ondansetron Injectable 8 milliGRAM(s) IV Push every 8 hours PRN  sodium chloride 0.9% lock flush 10 milliLiter(s) IV Push every 1 hour PRN      A/P:   ___ year old ___  with a history of ______________________  Pre / Status Post :  Autologous / Allogeneic PBSCT / BMT day ____________    1. Infectious Disease:   Fluconazole, Acyclovir     2. VOD Prophylaxis: Actigall, Glutamine, Heparin (dosed at 100 units / kg / day)     3. GI Prophylaxis:  Protonix    4. Mouthcare - NS / NaHCO3 rinses, Mycelex, Caphosol, skin care     5. GVHD prophylaxis     6. Transfuse & replete electrolytes prn     7. IV hydration, daily weights, strict I&O, prn diuresis     8. PO intake as tolerated, nutrition follow up as needed, MVI, folic acid     9. Antiemetics, anti-diarrhea medications:   Reglan, Ativan    10. OOB as tolerated, physical therapy consult if needed     11. Monitor coags / fibrinogen 2x week, vitamin K as needed     12. Monitor closely for clinical changes, monitor for fevers     13. Emotional support provided, plan of care discussed with patient and family, questions addressed     14. Patient education done regarding chemotherapy prep, plan of care, restrictions and discharge planning     15. Continue regular social work input     I have written the above note for Dr. Quiñones who performed service with me in the room.   Brandon Hansen  NP-C ([phone])    I have seen and examined patient with NP, I agree with above note as scribed. 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    S: Patient seen and examined with HPC Transplant Team:   + platelet reaction this AM - hives   + rash - likely residual from Kepivance   Denies mouth / tongue / throat pain, dyspnea, cough, nausea, vomiting, diarrhea, abdominal pain                                                                                                                                                       O: Vitals:   Vital Signs Last 24 Hrs  T(C): 36.6 (23 Oct 2019 06:23), Max: 36.8 (22 Oct 2019 17:50)  T(F): 97.9 (23 Oct 2019 06:23), Max: 98.3 (22 Oct 2019 17:50)  HR: 88 (23 Oct 2019 06:23) (81 - 95)  BP: 120/76 (23 Oct 2019 06:23) (116/77 - 145/82)  BP(mean): --  RR: 18 (23 Oct 2019 06:23) (18 - 18)  SpO2: 97% (22 Oct 2019 21:35) (97% - 100%)    Admit weight:   Daily     Daily Weight in k.9 (22 Oct 2019 11:04)    Intake / Output:   10-22 @ 07:01  -  10-23 @ 07:00  --------------------------------------------------------  IN: 1995 mL / OUT: 1675 mL / NET: 320 mL          PE:   Oropharynx: no ulceration or erythema  Oral Mucositis:   -                                             Grade: -  CVS: RRR, normal S1 S2  Lungs: CTA bilaterally   Abdomen: soft, + normoactive BS, NT, ND  Extremities: warm, no edema. Toe amputation  Gastric Mucositis:     -                                             Grade: -  Intestinal Mucositis:      -                                        Grade: -  Skin: patchy erythematous rash back and upper chest  TLC:  C/D/I  Neuro: Alert and oriented x 3  Pain: none         Labs:   CBC Full  -  ( 22 Oct 2019 07:35 )  WBC Count : <0.10 K/uL  Hemoglobin : 6.6 g/dL  Hematocrit : 19.9 %  Platelet Count - Automated : 8 K/uL  Mean Cell Volume : 88.8 fl  Mean Cell Hemoglobin : 29.5 pg  Mean Cell Hemoglobin Concentration : 33.2 gm/dL  Auto Neutrophil # : x  Auto Lymphocyte # : x  Auto Monocyte # : x  Auto Eosinophil # : x  Auto Basophil # : x  Auto Neutrophil % : x  Auto Lymphocyte % : x  Auto Monocyte % : x  Auto Eosinophil % : x  Auto Basophil % : x                          6.6    <0.10 )-----------( 8        ( 22 Oct 2019 07:35 )             19.9     10-22    138  |  103  |  18  ----------------------------<  103<H>  4.4   |  25  |  0.86    Ca    9.2      22 Oct 2019 07:28  Phos  4.1     10-22  Mg     1.6     10-22    TPro  5.5<L>  /  Alb  3.3  /  TBili  0.4  /  DBili  x   /  AST  12  /  ALT  10  /  AlkPhos  65  10-22      LIVER FUNCTIONS - ( 22 Oct 2019 07:28 )  Alb: 3.3 g/dL / Pro: 5.5 g/dL / ALK PHOS: 65 U/L / ALT: 10 U/L / AST: 12 U/L / GGT: x                   Karnofsky / Lansky Scale:   GVHD:   Skin:   Liver:   Gut:   Overall Grade:       Cultures:         Radiology:       Meds:   Antimicrobials:   acyclovir   Oral Tab/Cap 400 milliGRAM(s) Oral every 8 hours  cefepime   IVPB 2000 milliGRAM(s) IV Intermittent every 8 hours  clotrimazole Lozenge 1 Lozenge Oral five times a day  voriconazole 200 milliGRAM(s) Oral every 12 hours      Heme / Onc:   heparin  Infusion 339 Unit(s)/Hr IV Continuous <Continuous>      GI:  docusate sodium 100 milliGRAM(s) Oral three times a day  pantoprazole    Tablet 40 milliGRAM(s) Oral before breakfast  senna 2 Tablet(s) Oral at bedtime  sodium bicarbonate Mouth Rinse 10 milliLiter(s) Swish and Spit five times a day  ursodiol Capsule 300 milliGRAM(s) Oral two times a day with meals      Cardiovascular:   enalapril 2.5 milliGRAM(s) Oral daily  metoprolol succinate ER 12.5 milliGRAM(s) Oral two times a day  tamsulosin 0.4 milliGRAM(s) Oral at bedtime      Immunologic:   filgrastim-sndz Injectable 480 MICROGram(s) SubCutaneous daily  immune   globulin 10% (GAMMAGARD) IVPB 30 Gram(s) IV Intermittent <User Schedule>  mycophenolate mofetil 1000 milliGRAM(s) Oral <User Schedule>  tacrolimus 1.5 milliGRAM(s) Oral two times a day      Other medications:   acetaminophen   Tablet .. 650 milliGRAM(s) Oral every 6 hours  acetaminophen   Tablet .. 650 milliGRAM(s) Oral <User Schedule>  ALPRAZolam 1 milliGRAM(s) Oral at bedtime  Biotene Dry Mouth Oral Rinse 5 milliLiter(s) Swish and Spit five times a day  chlorhexidine 4% Liquid 1 Application(s) Topical <User Schedule>  diphenhydrAMINE   Injectable 25 milliGRAM(s) IV Push <User Schedule>  finasteride 5 milliGRAM(s) Oral daily  folic acid 1 milliGRAM(s) Oral daily  lidocaine/prilocaine Cream 1 Application(s) Topical daily  magnesium oxide 400 milliGRAM(s) Oral three times a day with meals  multivitamin 1 Tablet(s) Oral daily  sertraline 50 milliGRAM(s) Oral at bedtime  sodium chloride 0.9%. 1000 milliLiter(s) IV Continuous <Continuous>      PRN:   acetaminophen   Tablet .. 650 milliGRAM(s) Oral every 6 hours PRN  acetaminophen  IVPB .. 1000 milliGRAM(s) IV Intermittent once PRN  diphenhydrAMINE   Injectable 25 milliGRAM(s) IV Push every 4 hours PRN  hydrocortisone sodium succinate Injectable 50 milliGRAM(s) IV Push once PRN  metoclopramide Injectable 10 milliGRAM(s) IV Push every 6 hours PRN  ondansetron Injectable 8 milliGRAM(s) IV Push every 8 hours PRN  sodium chloride 0.9% lock flush 10 milliLiter(s) IV Push every 1 hour PRN      A/P:   59 year old male with a history of AML  Post Allogeneic PBSCT day +14  Strict I&O, daily weights, prn diuresis   Cardiology following   10/14- Start Tacro, Cellcept and Zarxio  Afebrile, neutropenic - continue Cefepime     1. Infectious Disease:   acyclovir   Oral Tab/Cap 400 milliGRAM(s) Oral every 8 hours  cefepime   IVPB 2000 milliGRAM(s) IV Intermittent every 8 hours  clotrimazole Lozenge 1 Lozenge Oral five times a day  voriconazole 200 milliGRAM(s) Oral every 12 hours    2. VOD Prophylaxis: Actigall, Glutamine, Heparin (dosed at 100 units / kg / day)     3. GI Prophylaxis:   pantoprazole    Tablet 40 milliGRAM(s) Oral before breakfast    4. Mouthcare - NS / NaHCO3 rinses, Mycelex, Biotene; Skin care     5. GVHD prophylaxis   mycophenolate mofetil 1000 milliGRAM(s) Oral <User Schedule>  tacrolimus 1.5 milliGRAM(s) Oral two times a day    6. Transfuse & replete electrolytes prn   magnesium oxide 400 milliGRAM(s) Oral three times a day with meals    7. IV hydration, daily weights, strict I&O, prn diuresis     8. PO intake as tolerated, nutrition follow up as needed, MVI, folic acid     9. Antiemetics, anti-diarrhea medications:   metoclopramide Injectable 10 milliGRAM(s) IV Push every 6 hours PRN  ondansetron Injectable 8 milliGRAM(s) IV Push every 8 hours PRN    10. OOB as tolerated, physical therapy consult if needed     11. Monitor coags / fibrinogen 2x week, vitamin K as needed     12. Monitor closely for clinical changes, monitor for fevers     13. Emotional support provided, plan of care discussed and questions addressed     14. Patient education done regarding plan of care, restrictions and discharge planning     15. Continue regular social work input     I have written the above note for Dr. Chao who performed service with me in the room.   Brandon Hansen  NP-C ([phone])    I have seen and examined patient with NP, I agree with above note as scribed. 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    S: Patient seen and examined with HPC Transplant Team:   " I'm feeling better today"  appetite "ok"  + rash - likely residual from Kepivance resolving  Denies mouth / tongue / throat pain, dyspnea, cough, nausea, vomiting, diarrhea, abdominal pain                                                                                                                                                       O: Vitals:   Vital Signs Last 24 Hrs  T(C): 36.6 (23 Oct 2019 06:23), Max: 36.8 (22 Oct 2019 17:50)  T(F): 97.9 (23 Oct 2019 06:23), Max: 98.3 (22 Oct 2019 17:50)  HR: 88 (23 Oct 2019 06:23) (81 - 95)  BP: 120/76 (23 Oct 2019 06:23) (116/77 - 145/82)  BP(mean): --  RR: 18 (23 Oct 2019 06:23) (18 - 18)  SpO2: 97% (22 Oct 2019 21:35) (97% - 100%)    Admit weight:   Daily     Daily Weight in k.9 (22 Oct 2019 11:04)    Intake / Output:   10-22 @ 07:01  -  10-23 @ 07:00  --------------------------------------------------------  IN: 1995 mL / OUT: 1675 mL / NET: 320 mL          PE:   Oropharynx: no ulceration or erythema  Oral Mucositis:   -                                             Grade: -  CVS: RRR, normal S1 S2  Lungs: CTA bilaterally   Abdomen: soft, + normoactive BS, NT, ND  Extremities: warm, no edema. Toe amputation  Gastric Mucositis:     -                                             Grade: -  Intestinal Mucositis:      -                                        Grade: -  Skin: patchy erythematous rash back and upper chest  TLC:  C/D/I  Neuro: Alert and oriented x 3  Pain: none         Labs:   CBC Full  -  ( 22 Oct 2019 07:35 )  WBC Count : <0.10 K/uL  Hemoglobin : 6.6 g/dL  Hematocrit : 19.9 %  Platelet Count - Automated : 8 K/uL  Mean Cell Volume : 88.8 fl  Mean Cell Hemoglobin : 29.5 pg  Mean Cell Hemoglobin Concentration : 33.2 gm/dL  Auto Neutrophil # : x  Auto Lymphocyte # : x  Auto Monocyte # : x  Auto Eosinophil # : x  Auto Basophil # : x  Auto Neutrophil % : x  Auto Lymphocyte % : x  Auto Monocyte % : x  Auto Eosinophil % : x  Auto Basophil % : x                          6.6    <0.10 )-----------( 8        ( 22 Oct 2019 07:35 )             19.9     10    138  |  103  |  18  ----------------------------<  103<H>  4.4   |  25  |  0.86    Ca    9.2      22 Oct 2019 07:28  Phos  4.1     10  Mg     1.6     10-22    TPro  5.5<L>  /  Alb  3.3  /  TBili  0.4  /  DBili  x   /  AST  12  /  ALT  10  /  AlkPhos  65  10-22      LIVER FUNCTIONS - ( 22 Oct 2019 07:28 )  Alb: 3.3 g/dL / Pro: 5.5 g/dL / ALK PHOS: 65 U/L / ALT: 10 U/L / AST: 12 U/L / GGT: x                   Karnofsky / Lansky Scale:   GVHD:   Skin:   Liver:   Gut:   Overall Grade:       Cultures:         Radiology:       Meds:   Antimicrobials:   acyclovir   Oral Tab/Cap 400 milliGRAM(s) Oral every 8 hours  cefepime   IVPB 2000 milliGRAM(s) IV Intermittent every 8 hours  clotrimazole Lozenge 1 Lozenge Oral five times a day  voriconazole 200 milliGRAM(s) Oral every 12 hours      Heme / Onc:   heparin  Infusion 339 Unit(s)/Hr IV Continuous <Continuous>      GI:  docusate sodium 100 milliGRAM(s) Oral three times a day  pantoprazole    Tablet 40 milliGRAM(s) Oral before breakfast  senna 2 Tablet(s) Oral at bedtime  sodium bicarbonate Mouth Rinse 10 milliLiter(s) Swish and Spit five times a day  ursodiol Capsule 300 milliGRAM(s) Oral two times a day with meals      Cardiovascular:   enalapril 2.5 milliGRAM(s) Oral daily  metoprolol succinate ER 12.5 milliGRAM(s) Oral two times a day  tamsulosin 0.4 milliGRAM(s) Oral at bedtime      Immunologic:   filgrastim-sndz Injectable 480 MICROGram(s) SubCutaneous daily  immune   globulin 10% (GAMMAGARD) IVPB 30 Gram(s) IV Intermittent <User Schedule>  mycophenolate mofetil 1000 milliGRAM(s) Oral <User Schedule>  tacrolimus 1.5 milliGRAM(s) Oral two times a day      Other medications:   acetaminophen   Tablet .. 650 milliGRAM(s) Oral every 6 hours  acetaminophen   Tablet .. 650 milliGRAM(s) Oral <User Schedule>  ALPRAZolam 1 milliGRAM(s) Oral at bedtime  Biotene Dry Mouth Oral Rinse 5 milliLiter(s) Swish and Spit five times a day  chlorhexidine 4% Liquid 1 Application(s) Topical <User Schedule>  diphenhydrAMINE   Injectable 25 milliGRAM(s) IV Push <User Schedule>  finasteride 5 milliGRAM(s) Oral daily  folic acid 1 milliGRAM(s) Oral daily  lidocaine/prilocaine Cream 1 Application(s) Topical daily  magnesium oxide 400 milliGRAM(s) Oral three times a day with meals  multivitamin 1 Tablet(s) Oral daily  sertraline 50 milliGRAM(s) Oral at bedtime  sodium chloride 0.9%. 1000 milliLiter(s) IV Continuous <Continuous>      PRN:   acetaminophen   Tablet .. 650 milliGRAM(s) Oral every 6 hours PRN  acetaminophen  IVPB .. 1000 milliGRAM(s) IV Intermittent once PRN  diphenhydrAMINE   Injectable 25 milliGRAM(s) IV Push every 4 hours PRN  hydrocortisone sodium succinate Injectable 50 milliGRAM(s) IV Push once PRN  metoclopramide Injectable 10 milliGRAM(s) IV Push every 6 hours PRN  ondansetron Injectable 8 milliGRAM(s) IV Push every 8 hours PRN  sodium chloride 0.9% lock flush 10 milliLiter(s) IV Push every 1 hour PRN      A/P:   59 year old male with a history of AML  Post Allogeneic PBSCT day +14  Strict I&O, daily weights, prn diuresis   Cardiology following   10/14- Start Tacro, Cellcept and Zarxio  Afebrile, neutropenic - continue Cefepime     1. Infectious Disease:   acyclovir   Oral Tab/Cap 400 milliGRAM(s) Oral every 8 hours  cefepime   IVPB 2000 milliGRAM(s) IV Intermittent every 8 hours  clotrimazole Lozenge 1 Lozenge Oral five times a day  voriconazole 200 milliGRAM(s) Oral every 12 hours    2. VOD Prophylaxis: Actigall, Glutamine, Heparin (dosed at 100 units / kg / day)     3. GI Prophylaxis:   pantoprazole    Tablet 40 milliGRAM(s) Oral before breakfast    4. Mouthcare - NS / NaHCO3 rinses, Mycelex, Biotene; Skin care     5. GVHD prophylaxis   mycophenolate mofetil 1000 milliGRAM(s) Oral <User Schedule>  tacrolimus 1.5 milliGRAM(s) Oral two times a day    6. Transfuse & replete electrolytes prn   magnesium oxide 400 milliGRAM(s) Oral three times a day with meals    7. IV hydration, daily weights, strict I&O, prn diuresis     8. PO intake as tolerated, nutrition follow up as needed, MVI, folic acid     9. Antiemetics, anti-diarrhea medications:   metoclopramide Injectable 10 milliGRAM(s) IV Push every 6 hours PRN  ondansetron Injectable 8 milliGRAM(s) IV Push every 8 hours PRN    10. OOB as tolerated, physical therapy consult if needed     11. Monitor coags / fibrinogen 2x week, vitamin K as needed     12. Monitor closely for clinical changes, monitor for fevers     13. Emotional support provided, plan of care discussed and questions addressed     14. Patient education done regarding plan of care, restrictions and discharge planning     15. Continue regular social work input     I have written the above note for Dr. Chao who performed service with me in the room.   Brandon Hansen  NP-C ([phone])    I have seen and examined patient with NP, I agree with above note as scribed. 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  + rash - likely residual from Kepivance resolving.    Denies any chest pain, palpitation, SOB, abdominal pain, nausea, vomiting or diarrhea.                                                                                                                                                     O: Vitals:   Vital Signs Last 24 Hrs  T(C): 36.6 (23 Oct 2019 06:23), Max: 36.8 (22 Oct 2019 17:50)  T(F): 97.9 (23 Oct 2019 06:23), Max: 98.3 (22 Oct 2019 17:50)  HR: 88 (23 Oct 2019 06:23) (81 - 95)  BP: 120/76 (23 Oct 2019 06:23) (116/77 - 145/82)  BP(mean): --  RR: 18 (23 Oct 2019 06:23) (18 - 18)  SpO2: 97% (22 Oct 2019 21:35) (97% - 100%)    Admit weight: 81.5 kg  Daily weight:    80.5 kg    Intake / Output:   10-22 @ 07:01  -  10-23 @ 07:00  --------------------------------------------------------  IN: 1995 mL / OUT: 1675 mL / NET: 320 mL    PE:   Oropharynx: no ulceration or erythema  Oral Mucositis:   -                                             Grade: -  CVS: RRR, normal S1 S2  Lungs: CTA bilaterally   Abdomen: soft, + normoactive BS, NT, ND  Extremities: warm, no edema. Toe amputation  Gastric Mucositis:     -                                             Grade: -  Intestinal Mucositis:      -                                        Grade: -  Skin: patchy erythematous rash back and upper chest  TLC:  C/D/I  Neuro: Alert and oriented x 3  Pain: none     Labs:                          7.2    <0.10 )-----------( 17       ( 23 Oct 2019 07:13 )             21.0     Mean Cell Volume : 87.1 fl  Mean Cell Hemoglobin : 29.9 pg  Mean Cell Hemoglobin Concentration : 34.3 gm/dL  Auto Neutrophil # : x  Auto Lymphocyte # : x  Auto Monocyte # : x  Auto Eosinophil # : x  Auto Basophil # : x  Auto Neutrophil % : x  Auto Lymphocyte % : x  Auto Monocyte % : x  Auto Eosinophil % : x  Auto Basophil % : x      10-23    142  |  107  |  19  ----------------------------<  99  4.1   |  24  |  0.90    Ca    8.9      23 Oct 2019 07:13  Phos  3.7     10-23  Mg     1.5     10-23    TPro  5.4<L>  /  Alb  3.2<L>  /  TBili  0.4  /  DBili  <0.1  /  AST  10  /  ALT  7<L>  /  AlkPhos  63  10-23    Mg 1.5  Phos 3.7  LDH 98  Uric Acid --    Karnofsky / Lansky Scale: Will assess upon engraftment.  GVHD:   Skin:   Liver:   Gut:   Overall Grade:     Cultures:   Culture - Blood (10.12.19 @ 22:28)    Specimen Source: .Blood    Culture Results:   No growth at 5 days.    Culture - Urine (10.13.19 @ 04:27)    Specimen Source: .Urine    Culture Results:   No growth    Radiology:    Xray Chest 1 View- PORTABLE-Routine (10.12.19 @ 09:00) >  Clear lungs.    Meds:   Antimicrobials:   acyclovir   Oral Tab/Cap 400 milliGRAM(s) Oral every 8 hours  cefepime   IVPB 2000 milliGRAM(s) IV Intermittent every 8 hours  clotrimazole Lozenge 1 Lozenge Oral five times a day  voriconazole 200 milliGRAM(s) Oral every 12 hours    Heme / Onc:   heparin  Infusion 339 Unit(s)/Hr IV Continuous <Continuous>    GI:  docusate sodium 100 milliGRAM(s) Oral three times a day  pantoprazole    Tablet 40 milliGRAM(s) Oral before breakfast  senna 2 Tablet(s) Oral at bedtime  sodium bicarbonate Mouth Rinse 10 milliLiter(s) Swish and Spit five times a day  ursodiol Capsule 300 milliGRAM(s) Oral two times a day with meals    Cardiovascular:   enalapril 2.5 milliGRAM(s) Oral daily  metoprolol succinate ER 12.5 milliGRAM(s) Oral two times a day  tamsulosin 0.4 milliGRAM(s) Oral at bedtime    Immunologic:   filgrastim-sndz Injectable 480 MICROGram(s) SubCutaneous daily  immune   globulin 10% (GAMMAGARD) IVPB 30 Gram(s) IV Intermittent <User Schedule>  mycophenolate mofetil 1000 milliGRAM(s) Oral <User Schedule>  tacrolimus 1.5 milliGRAM(s) Oral two times a day    Other medications:   acetaminophen   Tablet .. 650 milliGRAM(s) Oral every 6 hours  acetaminophen   Tablet .. 650 milliGRAM(s) Oral <User Schedule>  ALPRAZolam 1 milliGRAM(s) Oral at bedtime  Biotene Dry Mouth Oral Rinse 5 milliLiter(s) Swish and Spit five times a day  chlorhexidine 4% Liquid 1 Application(s) Topical <User Schedule>  diphenhydrAMINE   Injectable 25 milliGRAM(s) IV Push <User Schedule>  finasteride 5 milliGRAM(s) Oral daily  folic acid 1 milliGRAM(s) Oral daily  lidocaine/prilocaine Cream 1 Application(s) Topical daily  magnesium oxide 400 milliGRAM(s) Oral three times a day with meals  multivitamin 1 Tablet(s) Oral daily  sertraline 50 milliGRAM(s) Oral at bedtime  sodium chloride 0.9%. 1000 milliLiter(s) IV Continuous <Continuous>    PRN:   acetaminophen   Tablet .. 650 milliGRAM(s) Oral every 6 hours PRN  acetaminophen  IVPB .. 1000 milliGRAM(s) IV Intermittent once PRN  diphenhydrAMINE   Injectable 25 milliGRAM(s) IV Push every 4 hours PRN  hydrocortisone sodium succinate Injectable 50 milliGRAM(s) IV Push once PRN  metoclopramide Injectable 10 milliGRAM(s) IV Push every 6 hours PRN  ondansetron Injectable 8 milliGRAM(s) IV Push every 8 hours PRN  sodium chloride 0.9% lock flush 10 milliLiter(s) IV Push every 1 hour PRN    A/P:   59 year old male with a history of AML  Post Allogeneic PBSCT day +14  Strict I&O, daily weights, prn diuresis   Cardiology following   10/14- Started Tacro, Cellcept and Zarxio  Afebrile, neutropenic - continue Cefepime.  10/11- Patient will need split units of  PRBC if need to be transfused due to low EF.  10/23-  Platelets transfusion reaction yesterday, transfusion workup in process.    1. Infectious Disease:   acyclovir   Oral Tab/Cap 400 milliGRAM(s) Oral every 8 hours  cefepime   IVPB 2000 milliGRAM(s) IV Intermittent every 8 hours  clotrimazole Lozenge 1 Lozenge Oral five times a day  voriconazole 200 milliGRAM(s) Oral every 12 hours    2. VOD Prophylaxis: Actigall, Glutamine, Heparin (dosed at 100 units / kg / day)     3. GI Prophylaxis:   pantoprazole    Tablet 40 milliGRAM(s) Oral before breakfast    4. Mouth care - NS / NaHCO3 rinses, Mycelex, Biotene; Skin care     5. GVHD prophylaxis   mycophenolate mofetil 1000 milliGRAM(s) Oral <User Schedule>  tacrolimus 1.5 milliGRAM(s) Oral two times a day    6. Transfuse & replete electrolytes prn.  Hypomagnesemia- Supplement magnesium.    magnesium oxide 400 milliGRAM(s) Oral three times a day with meals    7. IV hydration, daily weights, strict I&O, prn diuresis     8. PO intake as tolerated, nutrition follow up as needed, MVI, folic acid     9. Antiemetics, anti-diarrhea medications:   metoclopramide Injectable 10 milliGRAM(s) IV Push every 6 hours PRN  ondansetron Injectable 8 milliGRAM(s) IV Push every 8 hours PRN    10. OOB as tolerated, physical therapy consult if needed     11. Monitor coags / fibrinogen 2x week, vitamin K as needed     12. Monitor closely for clinical changes, monitor for fevers     13. Emotional support provided, plan of care discussed and questions addressed     14. Patient education done regarding plan of care, restrictions and discharge planning     15. Continue regular social work input     I have written the above note for Dr. Chao who performed service with me in the room.   Brandon Hansen  NP-C ([phone])    I have seen and examined patient with NP, I agree with above note as scribed. [facility] Team                                                      Critical / Counseling Time Provided: 30 minutes      Chief Complaint: Haploidentical pbsct from son for treatment of AML    S: Patient seen and examined with [facility]PC Transplant Team:     " I'm feeling better today, appetite improving" .  + rash - likely residual from Kepivance now resolving.    Denies any chest pain, palpitation, SOB, abdominal pain, nausea, vomiting or diarrhea.                                                                                                                                                     O: Vitals:   Vital Signs Last 24 Hrs  T(C): 36.6 (23 Oct 2019 06:23), Max: 36.8 (22 Oct 2019 17:50)  T(F): 97.9 (23 Oct 2019 06:23), Max: 98.3 (22 Oct 2019 17:50)  HR: 88 (23 Oct 2019 06:23) (81 - 95)  BP: 120/76 (23 Oct 2019 06:23) (116/77 - 145/82)  BP(mean): --  RR: 18 (23 Oct 2019 06:23) (18 - 18)  SpO2: 97% (22 Oct 2019 21:35) (97% - 100%)    Admit weight: 81.5 kg  Daily weight:    80.5 kg    Intake / Output:   10-22 @ 07:01  -  10-23 @ 07:00  --------------------------------------------------------  IN: 1995 mL / OUT: 1675 mL / NET: 320 mL    PE:   Oropharynx: no ulceration or erythema  Oral Mucositis:   -                                             Grade: -  CVS: RRR, normal S1 S2  Lungs: CTA bilaterally   Abdomen: soft, + normoactive BS, NT, ND  Extremities: warm, no edema. Toe amputation  Gastric Mucositis:     -                                             Grade: -  Intestinal Mucositis:      -                                        Grade: -  Skin: patchy erythematous rash back and upper chest  TLC:  C/D/I  Neuro: Alert and oriented x 3  Pain: none     Labs:                          7.2    <0.10 )-----------( 17       ( 23 Oct 2019 07:13 )             21.0     Mean Cell Volume : 87.1 fl  Mean Cell Hemoglobin : 29.9 pg  Mean Cell Hemoglobin Concentration : 34.3 gm/dL  Auto Neutrophil # : x  Auto Lymphocyte # : x  Auto Monocyte # : x  Auto Eosinophil # : x  Auto Basophil # : x  Auto Neutrophil % : x  Auto Lymphocyte % : x  Auto Monocyte % : x  Auto Eosinophil % : x  Auto Basophil % : x      10-23    142  |  107  |  19  ----------------------------<  99  4.1   |  24  |  0.90    Ca    8.9      23 Oct 2019 07:13  Phos  3.7     10-23  Mg     1.5     10-23    TPro  5.4<L>  /  Alb  3.2<L>  /  TBili  0.4  /  DBili  <0.1  /  AST  10  /  ALT  7<L>  /  AlkPhos  63  10-23    Mg 1.5  Phos 3.7  LDH 98  Uric Acid --    Karnofsky / Lansky Scale: Will assess upon engraftment.  GVHD:   Skin:   Liver:   Gut:   Overall Grade:     Cultures:   Culture - Blood (10.12.19 @ 22:28)    Specimen Source: .Blood    Culture Results:   No growth at 5 days.    Culture - Urine (10.13.19 @ 04:27)    Specimen Source: .Urine    Culture Results:   No growth    Radiology:    Xray Chest 1 View- PORTABLE-Routine (10.12.19 @ 09:00) >  Clear lungs.    Meds:   Antimicrobials:   acyclovir   Oral Tab/Cap 400 milliGRAM(s) Oral every 8 hours  cefepime   IVPB 2000 milliGRAM(s) IV Intermittent every 8 hours  clotrimazole Lozenge 1 Lozenge Oral five times a day  voriconazole 200 milliGRAM(s) Oral every 12 hours    Heme / Onc:   heparin  Infusion 339 Unit(s)/Hr IV Continuous <Continuous>    GI:  docusate sodium 100 milliGRAM(s) Oral three times a day  pantoprazole    Tablet 40 milliGRAM(s) Oral before breakfast  senna 2 Tablet(s) Oral at bedtime  sodium bicarbonate Mouth Rinse 10 milliLiter(s) Swish and Spit five times a day  ursodiol Capsule 300 milliGRAM(s) Oral two times a day with meals    Cardiovascular:   enalapril 2.5 milliGRAM(s) Oral daily  metoprolol succinate ER 12.5 milliGRAM(s) Oral two times a day  tamsulosin 0.4 milliGRAM(s) Oral at bedtime    Immunologic:   filgrastim-sndz Injectable 480 MICROGram(s) SubCutaneous daily  immune   globulin 10% (GAMMAGARD) IVPB 30 Gram(s) IV Intermittent <User Schedule>  mycophenolate mofetil 1000 milliGRAM(s) Oral <User Schedule>  tacrolimus 1.5 milliGRAM(s) Oral two times a day    Other medications:   acetaminophen   Tablet .. 650 milliGRAM(s) Oral every 6 hours  acetaminophen   Tablet .. 650 milliGRAM(s) Oral <User Schedule>  ALPRAZolam 1 milliGRAM(s) Oral at bedtime  Biotene Dry Mouth Oral Rinse 5 milliLiter(s) Swish and Spit five times a day  chlorhexidine 4% Liquid 1 Application(s) Topical <User Schedule>  diphenhydrAMINE   Injectable 25 milliGRAM(s) IV Push <User Schedule>  finasteride 5 milliGRAM(s) Oral daily  folic acid 1 milliGRAM(s) Oral daily  lidocaine/prilocaine Cream 1 Application(s) Topical daily  magnesium oxide 400 milliGRAM(s) Oral three times a day with meals  multivitamin 1 Tablet(s) Oral daily  sertraline 50 milliGRAM(s) Oral at bedtime  sodium chloride 0.9%. 1000 milliLiter(s) IV Continuous <Continuous>    PRN:   acetaminophen   Tablet .. 650 milliGRAM(s) Oral every 6 hours PRN  acetaminophen  IVPB .. 1000 milliGRAM(s) IV Intermittent once PRN  diphenhydrAMINE   Injectable 25 milliGRAM(s) IV Push every 4 hours PRN  hydrocortisone sodium succinate Injectable 50 milliGRAM(s) IV Push once PRN  metoclopramide Injectable 10 milliGRAM(s) IV Push every 6 hours PRN  ondansetron Injectable 8 milliGRAM(s) IV Push every 8 hours PRN  sodium chloride 0.9% lock flush 10 milliLiter(s) IV Push every 1 hour PRN    A/P:   59 year old male with a history of AML  Post Allogeneic PBSCT day +14  Strict I&O, daily weights, prn diuresis   Cardiology following   10/14- Started Tacro, Cellcept and Zarxio  Afebrile, neutropenic - continue Cefepime.  10/11- Patient will need split units of  PRBC if need to be transfused due to low EF.  10/23-  Platelets transfusion reaction yesterday, transfusion workup in process.    1. Infectious Disease:   acyclovir   Oral Tab/Cap 400 milliGRAM(s) Oral every 8 hours  cefepime   IVPB 2000 milliGRAM(s) IV Intermittent every 8 hours  clotrimazole Lozenge 1 Lozenge Oral five times a day  voriconazole 200 milliGRAM(s) Oral every 12 hours    2. VOD Prophylaxis: Actigall, Glutamine, Heparin (dosed at 100 units / kg / day)     3. GI Prophylaxis:   pantoprazole    Tablet 40 milliGRAM(s) Oral before breakfast    4. Mouth care - NS / NaHCO3 rinses, Mycelex, Biotene; Skin care     5. GVHD prophylaxis   mycophenolate mofetil 1000 milliGRAM(s) Oral <User Schedule>  tacrolimus 1.5 milliGRAM(s) Oral two times a day    6. Transfuse & replete electrolytes prn.  Hypomagnesemia- Supplement magnesium.    magnesium oxide 400 milliGRAM(s) Oral three times a day with meals    7. IV hydration, daily weights, strict I&O, prn diuresis     8. PO intake as tolerated, nutrition follow up as needed, MVI, folic acid     9. Antiemetics, anti-diarrhea medications:   metoclopramide Injectable 10 milliGRAM(s) IV Push every 6 hours PRN  ondansetron Injectable 8 milliGRAM(s) IV Push every 8 hours PRN    10. OOB as tolerated, physical therapy consult if needed     11. Monitor coags / fibrinogen 2x week, vitamin K as needed     12. Monitor closely for clinical changes, monitor for fevers     13. Emotional support provided, plan of care discussed and questions addressed     14. Patient education done regarding plan of care, restrictions and discharge planning     15. Continue regular social work input     I have written the above note for Dr. Chao who performed service with me in the room.   Brandon Hansen  NP-C ([phone])    I have seen and examined patient with NP, I agree with above note as scribed.

## 2019-10-23 NOTE — PROGRESS NOTE ADULT - ATTENDING COMMENTS
59 year old male, with previously diagnosed acute myeloid leukemia. s/p induction chemotherapy with Daunorubicin/Cytarabine and HIDAC consolidation chemotherapy, now admitted for Haplo-identical stem cell transplant from son. Conditioning regimen of Fludarabine, Cytoxan, and TBI. Other history includes non-ischemic cardiomyopathy (recent EF 25% 9/19/19), COLLEEN, DVT/PE, HTN, Factor V Leiden and amputation of right toe due to osteomyelitis.  Day + 13.  Febrile likely due to haplostorm,  s/p CTX 2/2.  Follow temps if persist can dose 1 mg/kg of solumedrol.  Blood/urine cultures from 10/10 negative.  Continue cefepime/voriconazole for now along with acyclovir prophylaxis.  If continues to spike, can consider adding IV vanco.  Strict I&O, daily weights, prn diuresis-   Renal insufficiency- monitor daily creatinine, normal today.   Started  Zarxio on day +5,  MMF and Tacrolimus. Check Tacrolimus levels on Monday and Thursday.   Anxiolytics, antinausea, antidiarrhea medications as needed.  Nutritional support.  Hx non-ischemic CM, followed by Dr. Luke Lyn, last Echo 9/19 (EF 25%).  Monitor for fluid overload.  VOD prophylaxis - low dose heparin gtt (dosed at 100 units / kg / day), glutamine supplementation, Actigall BID   GI prophylaxis - Protonix po QD.  Electrolyte support  Aggressive mouth care and skin care as per protocol.  OOB 59 year old male, with previously diagnosed acute myeloid leukemia. s/p induction chemotherapy with Daunorubicin/Cytarabine and HIDAC consolidation chemotherapy, now admitted for Haplo-identical stem cell transplant from son. Conditioning regimen of Fludarabine, Cytoxan, and TBI. Other history includes non-ischemic cardiomyopathy (recent EF 25% 9/19/19), COLLEEN, DVT/PE, HTN, Factor V Leiden and amputation of right toe due to osteomyelitis.  Day + 14.  Febrile likely due to haplostorm,  s/p CTX 2/2.  Follow temps if persist can dose 1 mg/kg of solumedrol.  Blood/urine cultures from 10/10 and 10/12 negative.  Continue cefepime/voriconazole for now along with acyclovir prophylaxis.   Strict I&O, daily weights, prn diuresis   Renal insufficiency- monitor daily creatinine, normal today.   Started  Zarxio on day +5,  MMF and Tacrolimus. Check Tacrolimus levels on Monday and Thursday.   Anxiolytics, antinausea, antidiarrhea medications as needed.  Nutritional support.  Hx non-ischemic CM, followed by Dr. Luke Lyn, last Echo 9/19 (EF 25%).  Monitor for fluid overload.  VOD prophylaxis - low dose heparin gtt (dosed at 100 units / kg / day), glutamine supplementation, Actigall BID   GI prophylaxis - Protonix po QD.  Electrolyte support  Aggressive mouth care and skin care as per protocol.  OOB

## 2019-10-23 NOTE — PROGRESS NOTE ADULT - SUBJECTIVE AND OBJECTIVE BOX
[facility] -- Ede Zamudio, Riki, Edgar, Jose Rabago Savella  Office # [phone]      Follow Up:  NICM    Subjective/Observations: Patient seen and examined. Events noted. Resting comfortably in bed. No complaints of chest pain, dyspnea, or palpitations reported. No signs of orthopnea or PND.       REVIEW OF SYSTEMS: All other review of systems is negative unless indicated above    PAST MEDICAL & SURGICAL HISTORY:  COLLEEN (obstructive sleep apnea)  Pulmonary embolism  Deep vein thrombosis (DVT)  H/O cardiomyopathy  Factor 5 Leiden mutation, heterozygous      MEDICATIONS  (STANDING):  acetaminophen   Tablet .. 650 milliGRAM(s) Oral every 6 hours  acetaminophen   Tablet .. 650 milliGRAM(s) Oral <User Schedule>  acyclovir   Oral Tab/Cap 400 milliGRAM(s) Oral every 8 hours  ALPRAZolam 1 milliGRAM(s) Oral at bedtime  Biotene Dry Mouth Oral Rinse 5 milliLiter(s) Swish and Spit five times a day  cefepime   IVPB 2000 milliGRAM(s) IV Intermittent every 8 hours  chlorhexidine 4% Liquid 1 Application(s) Topical <User Schedule>  clotrimazole Lozenge 1 Lozenge Oral five times a day  diphenhydrAMINE   Injectable 25 milliGRAM(s) IV Push <User Schedule>  docusate sodium 100 milliGRAM(s) Oral three times a day  enalapril 2.5 milliGRAM(s) Oral daily  filgrastim-sndz Injectable 480 MICROGram(s) SubCutaneous daily  finasteride 5 milliGRAM(s) Oral daily  folic acid 1 milliGRAM(s) Oral daily  heparin  Infusion 339 Unit(s)/Hr (3.39 mL/Hr) IV Continuous <Continuous>  immune   globulin 10% (GAMMAGARD) IVPB 30 Gram(s) IV Intermittent <User Schedule>  lidocaine/prilocaine Cream 1 Application(s) Topical daily  magnesium oxide 400 milliGRAM(s) Oral three times a day with meals  metoprolol succinate ER 12.5 milliGRAM(s) Oral two times a day  multivitamin 1 Tablet(s) Oral daily  mycophenolate mofetil 1000 milliGRAM(s) Oral <User Schedule>  pantoprazole    Tablet 40 milliGRAM(s) Oral before breakfast  senna 2 Tablet(s) Oral at bedtime  sertraline 50 milliGRAM(s) Oral at bedtime  sodium bicarbonate Mouth Rinse 10 milliLiter(s) Swish and Spit five times a day  sodium chloride 0.9%. 1000 milliLiter(s) (20 mL/Hr) IV Continuous <Continuous>  tacrolimus 1.5 milliGRAM(s) Oral two times a day  tamsulosin 0.4 milliGRAM(s) Oral at bedtime  ursodiol Capsule 300 milliGRAM(s) Oral two times a day with meals  voriconazole 200 milliGRAM(s) Oral every 12 hours    MEDICATIONS  (PRN):  acetaminophen   Tablet .. 650 milliGRAM(s) Oral every 6 hours PRN Temp greater or equal to 38C (100.4F), Mild Pain (1 - 3)  acetaminophen  IVPB .. 1000 milliGRAM(s) IV Intermittent once PRN Temp greater or equal to 38.5C (101.3F)  diphenhydrAMINE   Injectable 25 milliGRAM(s) IV Push every 4 hours PRN Allergy symptoms  hydrocortisone sodium succinate Injectable 50 milliGRAM(s) IV Push once PRN If hives  metoclopramide Injectable 10 milliGRAM(s) IV Push every 6 hours PRN Nausea and/or Vomiting  ondansetron Injectable 8 milliGRAM(s) IV Push every 8 hours PRN Nausea and/or Vomiting  sodium chloride 0.9% lock flush 10 milliLiter(s) IV Push every 1 hour PRN Pre/post blood products, medications, blood draw, and to maintain line patency      Allergies    No Known Allergies    Intolerances            Vital Signs Last 24 Hrs  T(C): 36.6 (23 Oct 2019 06:23), Max: 36.8 (22 Oct 2019 17:50)  T(F): 97.9 (23 Oct 2019 06:23), Max: 98.3 (22 Oct 2019 17:50)  HR: 88 (23 Oct 2019 06:23) (81 - 95)  BP: 120/76 (23 Oct 2019 06:23) (118/77 - 145/82)  BP(mean): --  RR: 18 (23 Oct 2019 06:23) (18 - 18)  SpO2: 97% (22 Oct 2019 21:35) (97% - 100%)    I&O's Summary    22 Oct 2019 07:01  -  23 Oct 2019 07:00  --------------------------------------------------------  IN: 1995 mL / OUT: 1675 mL / NET: 320 mL          PHYSICAL EXAM:     Constitutional: NAD, awake    HEENT: Moist Mucous Membranes, Anicteric  Pulmonary: Non-labored, breath sounds are clear bilaterally, No wheezing, rales or rhonchi  Cardiovascular: Regular, S1 and S2, No murmurs, rubs, gallops or clicks  Gastrointestinal: Bowel Sounds present, soft, nontender.   Lymph: No peripheral edema. No lymphadenopathy.  Skin: No visible rashes or ulcers.  Psych:  Mood & affect appropriate for situation    LABS: All Labs Reviewed:                        7.2    <0.10 )-----------( 17       ( 23 Oct 2019 07:13 )             21.0                         6.6    <0.10 )-----------( 8        ( 22 Oct 2019 07:35 )             19.9                         7.0    <0.10 )-----------( 14       ( 21 Oct 2019 07:11 )             20.2     23 Oct 2019 07:13    142    |  107    |  19     ----------------------------<  99     4.1     |  24     |  0.90   22 Oct 2019 07:28    138    |  103    |  18     ----------------------------<  103    4.4     |  25     |  0.86   21 Oct 2019 07:10    140    |  106    |  19     ----------------------------<  108    4.5     |  23     |  0.84     Ca    8.9        23 Oct 2019 07:13  Ca    9.2        22 Oct 2019 07:28  Ca    8.8        21 Oct 2019 07:10  Phos  3.7       23 Oct 2019 07:13  Phos  4.1       22 Oct 2019 07:28  Phos  3.6       21 Oct 2019 07:10  Mg     1.5       23 Oct 2019 07:13  Mg     1.6       22 Oct 2019 07:28  Mg     1.8       21 Oct 2019 07:10    TPro  5.4    /  Alb  3.2    /  TBili  0.4    /  DBili  <0.1   /  AST  10     /  ALT  7      /  AlkPhos  63     23 Oct 2019 07:13  TPro  5.5    /  Alb  3.3    /  TBili  0.4    /  DBili  x      /  AST  12     /  ALT  10     /  AlkPhos  65     22 Oct 2019 07:28  TPro  5.6    /  Alb  3.4    /  TBili  0.4    /  DBili  0.1    /  AST  8      /  ALT  9      /  AlkPhos  62     21 Oct 2019 07:10

## 2019-10-23 NOTE — PROGRESS NOTE ADULT - ASSESSMENT
59 year old male, with previously diagnosed acute myeloid leukemia. s/p induction chemotherapy with Daunorubicin/Cytarabine and HIDAC consolidation chemotherapy, now admitted for Haplo-identical stem cell transplant from son:    - No evidence of volume overload at present.  He is on fluids and has been getting IV Lasix prn to maintain net negative.  His recent CXR is clear.  - Continue to maintain strict I's and O's, and to monitor for signs of volume overload, which he is at risk for.  - No evidence of acute ischemia  - BP is overall stable  - Continue ACEI  and BB at current doses as tolerated by BP.    - On heparin gtt for hx of dvt/pe and hypercoagulable state, and adjust rate per protocol. Monitor platelet count which is now 17.  No bleeding, though is clearly at risk.  - Monitor and replete lytes  - To follow while admitted

## 2019-10-24 LAB
ALBUMIN SERPL ELPH-MCNC: 3.5 G/DL — SIGNIFICANT CHANGE UP (ref 3.3–5)
ALP SERPL-CCNC: 70 U/L — SIGNIFICANT CHANGE UP (ref 40–120)
ALT FLD-CCNC: 8 U/L — LOW (ref 10–45)
ANION GAP SERPL CALC-SCNC: 11 MMOL/L — SIGNIFICANT CHANGE UP (ref 5–17)
APTT BLD: 30 SEC — SIGNIFICANT CHANGE UP (ref 27.5–36.3)
AST SERPL-CCNC: 9 U/L — LOW (ref 10–40)
BILIRUB SERPL-MCNC: 0.4 MG/DL — SIGNIFICANT CHANGE UP (ref 0.2–1.2)
BUN SERPL-MCNC: 15 MG/DL — SIGNIFICANT CHANGE UP (ref 7–23)
CALCIUM SERPL-MCNC: 9.2 MG/DL — SIGNIFICANT CHANGE UP (ref 8.4–10.5)
CHLORIDE SERPL-SCNC: 104 MMOL/L — SIGNIFICANT CHANGE UP (ref 96–108)
CO2 SERPL-SCNC: 24 MMOL/L — SIGNIFICANT CHANGE UP (ref 22–31)
CREAT SERPL-MCNC: 0.92 MG/DL — SIGNIFICANT CHANGE UP (ref 0.5–1.3)
CULTURE RESULTS: SIGNIFICANT CHANGE UP
CULTURE RESULTS: SIGNIFICANT CHANGE UP
FIBRINOGEN PPP-MCNC: 413 MG/DL — SIGNIFICANT CHANGE UP (ref 350–510)
GLUCOSE SERPL-MCNC: 98 MG/DL — SIGNIFICANT CHANGE UP (ref 70–99)
HCT VFR BLD CALC: 23.5 % — LOW (ref 39–50)
HGB BLD-MCNC: 7.7 G/DL — LOW (ref 13–17)
INR BLD: 1.18 RATIO — HIGH (ref 0.88–1.16)
LDH SERPL L TO P-CCNC: 105 U/L — SIGNIFICANT CHANGE UP (ref 50–242)
MAGNESIUM SERPL-MCNC: 1.7 MG/DL — SIGNIFICANT CHANGE UP (ref 1.6–2.6)
MCHC RBC-ENTMCNC: 28.8 PG — SIGNIFICANT CHANGE UP (ref 27–34)
MCHC RBC-ENTMCNC: 32.8 GM/DL — SIGNIFICANT CHANGE UP (ref 32–36)
MCV RBC AUTO: 88 FL — SIGNIFICANT CHANGE UP (ref 80–100)
NRBC # BLD: 0 /100 WBCS — SIGNIFICANT CHANGE UP (ref 0–0)
PHOSPHATE SERPL-MCNC: 3.8 MG/DL — SIGNIFICANT CHANGE UP (ref 2.5–4.5)
PLATELET # BLD AUTO: 12 K/UL — CRITICAL LOW (ref 150–400)
POTASSIUM SERPL-MCNC: 4.4 MMOL/L — SIGNIFICANT CHANGE UP (ref 3.5–5.3)
POTASSIUM SERPL-SCNC: 4.4 MMOL/L — SIGNIFICANT CHANGE UP (ref 3.5–5.3)
PROT SERPL-MCNC: 5.8 G/DL — LOW (ref 6–8.3)
PROTHROM AB SERPL-ACNC: 13.5 SEC — HIGH (ref 10–12.9)
RBC # BLD: 2.67 M/UL — LOW (ref 4.2–5.8)
RBC # FLD: 17.5 % — HIGH (ref 10.3–14.5)
SODIUM SERPL-SCNC: 139 MMOL/L — SIGNIFICANT CHANGE UP (ref 135–145)
SPECIMEN SOURCE: SIGNIFICANT CHANGE UP
SPECIMEN SOURCE: SIGNIFICANT CHANGE UP
TACROLIMUS SERPL-MCNC: 10.4 NG/ML — SIGNIFICANT CHANGE UP
WBC # BLD: <0.1 K/UL — CRITICAL LOW (ref 3.8–10.5)
WBC # FLD AUTO: <0.1 K/UL — CRITICAL LOW (ref 3.8–10.5)

## 2019-10-24 PROCEDURE — 99291 CRITICAL CARE FIRST HOUR: CPT

## 2019-10-24 PROCEDURE — 99232 SBSQ HOSP IP/OBS MODERATE 35: CPT

## 2019-10-24 RX ADMIN — Medication 10 MILLILITER(S): at 00:00

## 2019-10-24 RX ADMIN — Medication 5 MILLILITER(S): at 20:41

## 2019-10-24 RX ADMIN — PANTOPRAZOLE SODIUM 40 MILLIGRAM(S): 20 TABLET, DELAYED RELEASE ORAL at 05:24

## 2019-10-24 RX ADMIN — MYCOPHENOLATE MOFETIL 1000 MILLIGRAM(S): 250 CAPSULE ORAL at 13:06

## 2019-10-24 RX ADMIN — Medication 12.5 MILLIGRAM(S): at 17:44

## 2019-10-24 RX ADMIN — Medication 5 MILLILITER(S): at 17:41

## 2019-10-24 RX ADMIN — Medication 5 MILLILITER(S): at 23:13

## 2019-10-24 RX ADMIN — Medication 5 MILLILITER(S): at 08:32

## 2019-10-24 RX ADMIN — Medication 400 MILLIGRAM(S): at 05:24

## 2019-10-24 RX ADMIN — Medication 2.5 MILLIGRAM(S): at 05:25

## 2019-10-24 RX ADMIN — Medication 1 MILLIGRAM(S): at 12:17

## 2019-10-24 RX ADMIN — Medication 1 TABLET(S): at 12:17

## 2019-10-24 RX ADMIN — Medication 1 LOZENGE: at 17:41

## 2019-10-24 RX ADMIN — MAGNESIUM OXIDE 400 MG ORAL TABLET 400 MILLIGRAM(S): 241.3 TABLET ORAL at 08:32

## 2019-10-24 RX ADMIN — Medication 480 MICROGRAM(S): at 17:46

## 2019-10-24 RX ADMIN — Medication 10 MILLILITER(S): at 08:32

## 2019-10-24 RX ADMIN — Medication 12.5 MILLIGRAM(S): at 05:25

## 2019-10-24 RX ADMIN — MYCOPHENOLATE MOFETIL 1000 MILLIGRAM(S): 250 CAPSULE ORAL at 08:31

## 2019-10-24 RX ADMIN — Medication 5 MILLILITER(S): at 12:17

## 2019-10-24 RX ADMIN — Medication 10 MILLILITER(S): at 12:17

## 2019-10-24 RX ADMIN — URSODIOL 300 MILLIGRAM(S): 250 TABLET, FILM COATED ORAL at 08:31

## 2019-10-24 RX ADMIN — FINASTERIDE 5 MILLIGRAM(S): 5 TABLET, FILM COATED ORAL at 12:17

## 2019-10-24 RX ADMIN — MAGNESIUM OXIDE 400 MG ORAL TABLET 400 MILLIGRAM(S): 241.3 TABLET ORAL at 12:17

## 2019-10-24 RX ADMIN — Medication 400 MILLIGRAM(S): at 21:15

## 2019-10-24 RX ADMIN — TACROLIMUS 1.5 MILLIGRAM(S): 5 CAPSULE ORAL at 17:42

## 2019-10-24 RX ADMIN — Medication 1 LOZENGE: at 12:17

## 2019-10-24 RX ADMIN — TAMSULOSIN HYDROCHLORIDE 0.4 MILLIGRAM(S): 0.4 CAPSULE ORAL at 21:19

## 2019-10-24 RX ADMIN — VORICONAZOLE 200 MILLIGRAM(S): 10 INJECTION, POWDER, LYOPHILIZED, FOR SOLUTION INTRAVENOUS at 17:42

## 2019-10-24 RX ADMIN — Medication 1 MILLIGRAM(S): at 21:18

## 2019-10-24 RX ADMIN — Medication 10 MILLILITER(S): at 23:13

## 2019-10-24 RX ADMIN — Medication 1 LOZENGE: at 20:42

## 2019-10-24 RX ADMIN — MYCOPHENOLATE MOFETIL 1000 MILLIGRAM(S): 250 CAPSULE ORAL at 21:15

## 2019-10-24 RX ADMIN — URSODIOL 300 MILLIGRAM(S): 250 TABLET, FILM COATED ORAL at 17:42

## 2019-10-24 RX ADMIN — Medication 400 MILLIGRAM(S): at 13:06

## 2019-10-24 RX ADMIN — MAGNESIUM OXIDE 400 MG ORAL TABLET 400 MILLIGRAM(S): 241.3 TABLET ORAL at 17:42

## 2019-10-24 RX ADMIN — SERTRALINE 50 MILLIGRAM(S): 25 TABLET, FILM COATED ORAL at 21:20

## 2019-10-24 RX ADMIN — VORICONAZOLE 200 MILLIGRAM(S): 10 INJECTION, POWDER, LYOPHILIZED, FOR SOLUTION INTRAVENOUS at 05:24

## 2019-10-24 RX ADMIN — Medication 10 MILLILITER(S): at 17:41

## 2019-10-24 RX ADMIN — TACROLIMUS 1.5 MILLIGRAM(S): 5 CAPSULE ORAL at 05:24

## 2019-10-24 RX ADMIN — CEFEPIME 100 MILLIGRAM(S): 1 INJECTION, POWDER, FOR SOLUTION INTRAMUSCULAR; INTRAVENOUS at 05:24

## 2019-10-24 RX ADMIN — Medication 10 MILLILITER(S): at 20:41

## 2019-10-24 RX ADMIN — CEFEPIME 100 MILLIGRAM(S): 1 INJECTION, POWDER, FOR SOLUTION INTRAMUSCULAR; INTRAVENOUS at 13:06

## 2019-10-24 RX ADMIN — Medication 1 LOZENGE: at 08:32

## 2019-10-24 RX ADMIN — Medication 1 LOZENGE: at 00:00

## 2019-10-24 RX ADMIN — Medication 1 LOZENGE: at 23:13

## 2019-10-24 RX ADMIN — CEFEPIME 100 MILLIGRAM(S): 1 INJECTION, POWDER, FOR SOLUTION INTRAMUSCULAR; INTRAVENOUS at 21:18

## 2019-10-24 NOTE — PROGRESS NOTE ADULT - SUBJECTIVE AND OBJECTIVE BOX
[facility]    Ede Zamudio, Riki, Edgar, Gem, Jose, Tabitha      [phone]    CHIEF COMPLAINT: Patient is a 59y old  Male who presents with a chief complaint of Allogenic stem cell transplant (23 Oct 2019 10:02)      Follow Up: nicm, bm tx    Interim history: The patient reports no new symptoms.  Denies chest discomfort and shortness of breath.  No abdominal pain.  No new neurologic symptoms.      MEDICATIONS  (STANDING):  acetaminophen   Tablet .. 650 milliGRAM(s) Oral every 6 hours  acetaminophen   Tablet .. 650 milliGRAM(s) Oral <User Schedule>  acyclovir   Oral Tab/Cap 400 milliGRAM(s) Oral every 8 hours  ALPRAZolam 1 milliGRAM(s) Oral at bedtime  Biotene Dry Mouth Oral Rinse 5 milliLiter(s) Swish and Spit five times a day  cefepime   IVPB 2000 milliGRAM(s) IV Intermittent every 8 hours  chlorhexidine 4% Liquid 1 Application(s) Topical <User Schedule>  clotrimazole Lozenge 1 Lozenge Oral five times a day  diphenhydrAMINE   Injectable 25 milliGRAM(s) IV Push <User Schedule>  docusate sodium 100 milliGRAM(s) Oral three times a day  enalapril 2.5 milliGRAM(s) Oral daily  filgrastim-sndz Injectable 480 MICROGram(s) SubCutaneous daily  finasteride 5 milliGRAM(s) Oral daily  folic acid 1 milliGRAM(s) Oral daily  heparin  Infusion 339 Unit(s)/Hr (3.39 mL/Hr) IV Continuous <Continuous>  immune   globulin 10% (GAMMAGARD) IVPB 30 Gram(s) IV Intermittent <User Schedule>  lidocaine/prilocaine Cream 1 Application(s) Topical daily  magnesium oxide 400 milliGRAM(s) Oral three times a day with meals  metoprolol succinate ER 12.5 milliGRAM(s) Oral two times a day  multivitamin 1 Tablet(s) Oral daily  mycophenolate mofetil 1000 milliGRAM(s) Oral <User Schedule>  pantoprazole    Tablet 40 milliGRAM(s) Oral before breakfast  senna 2 Tablet(s) Oral at bedtime  sertraline 50 milliGRAM(s) Oral at bedtime  sodium bicarbonate Mouth Rinse 10 milliLiter(s) Swish and Spit five times a day  sodium chloride 0.9%. 1000 milliLiter(s) (20 mL/Hr) IV Continuous <Continuous>  tacrolimus 1.5 milliGRAM(s) Oral two times a day  tamsulosin 0.4 milliGRAM(s) Oral at bedtime  ursodiol Capsule 300 milliGRAM(s) Oral two times a day with meals  voriconazole 200 milliGRAM(s) Oral every 12 hours    MEDICATIONS  (PRN):  acetaminophen   Tablet .. 650 milliGRAM(s) Oral every 6 hours PRN Temp greater or equal to 38C (100.4F), Mild Pain (1 - 3)  acetaminophen  IVPB .. 1000 milliGRAM(s) IV Intermittent once PRN Temp greater or equal to 38.5C (101.3F)  diphenhydrAMINE   Injectable 25 milliGRAM(s) IV Push every 4 hours PRN Allergy symptoms  hydrocortisone sodium succinate Injectable 50 milliGRAM(s) IV Push once PRN If hives  metoclopramide Injectable 10 milliGRAM(s) IV Push every 6 hours PRN Nausea and/or Vomiting  ondansetron Injectable 8 milliGRAM(s) IV Push every 8 hours PRN Nausea and/or Vomiting  sodium chloride 0.9% lock flush 10 milliLiter(s) IV Push every 1 hour PRN Pre/post blood products, medications, blood draw, and to maintain line patency      REVIEW OF SYSTEMS:  eye, ent, GI, , allergic, dermatologic, musculoskeletal and neurologic are negative except as described above    Vital Signs Last 24 Hrs  T(C): 36.3 (24 Oct 2019 00:45), Max: 36.8 (23 Oct 2019 14:00)  T(F): 97.3 (24 Oct 2019 00:45), Max: 98.2 (23 Oct 2019 14:00)  HR: 71 (24 Oct 2019 00:45) (71 - 88)  BP: 135/78 (24 Oct 2019 00:45) (120/76 - 138/88)  BP(mean): --  RR: 18 (24 Oct 2019 00:45) (17 - 18)  SpO2: 99% (24 Oct 2019 00:45) (97% - 99%)    I&O's Summary    22 Oct 2019 07:01  -  23 Oct 2019 07:00  --------------------------------------------------------  IN: 1995 mL / OUT: 1675 mL / NET: 320 mL    23 Oct 2019 07:01  -  24 Oct 2019 05:21  --------------------------------------------------------  IN: 1320 mL / OUT: 1850 mL / NET: -530 mL        Telemetry past 24h:    PHYSICAL EXAM:    Constitutional: well-nourished, well-developed, NAD   HEENT:  MMM, sclerae anicteric, conjunctivae clear, no oral cyanosis.  Pulmonary: Non-labored, breath sounds are clear bilaterally, No wheezing, rales or rhonchi  Cardiovascular: Regular, S1 and S2.  No murmur.  No rubs, gallops or clicks  Gastrointestinal: Bowel Sounds present, soft, nontender.   Lymph: No peripheral edema.   Neurological: Alert, no focal deficits  Skin: No rashes.  Psych:  Mood & affect appropriate    LABS: All Labs Reviewed:                        7.2    <0.10 )-----------( 17       ( 23 Oct 2019 07:13 )             21.0                         6.6    <0.10 )-----------( 8        ( 22 Oct 2019 07:35 )             19.9                         7.0    <0.10 )-----------( 14       ( 21 Oct 2019 07:11 )             20.2     23 Oct 2019 07:13    142    |  107    |  19     ----------------------------<  99     4.1     |  24     |  0.90   22 Oct 2019 07:28    138    |  103    |  18     ----------------------------<  103    4.4     |  25     |  0.86   21 Oct 2019 07:10    140    |  106    |  19     ----------------------------<  108    4.5     |  23     |  0.84     Ca    8.9        23 Oct 2019 07:13  Ca    9.2        22 Oct 2019 07:28  Ca    8.8        21 Oct 2019 07:10  Phos  3.7       23 Oct 2019 07:13  Phos  4.1       22 Oct 2019 07:28  Phos  3.6       21 Oct 2019 07:10  Mg     1.5       23 Oct 2019 07:13  Mg     1.6       22 Oct 2019 07:28  Mg     1.8       21 Oct 2019 07:10    TPro  5.4    /  Alb  3.2    /  TBili  0.4    /  DBili  <0.1   /  AST  10     /  ALT  7      /  AlkPhos  63     23 Oct 2019 07:13  TPro  5.5    /  Alb  3.3    /  TBili  0.4    /  DBili  x      /  AST  12     /  ALT  10     /  AlkPhos  65     22 Oct 2019 07:28  TPro  5.6    /  Alb  3.4    /  TBili  0.4    /  DBili  0.1    /  AST  8      /  ALT  9      /  AlkPhos  62     21 Oct 2019 07:10          Blood Culture:         RADIOLOGY:    EKG:    Echo:

## 2019-10-24 NOTE — PROGRESS NOTE ADULT - ATTENDING COMMENTS
59 year old male, with previously diagnosed acute myeloid leukemia. s/p induction chemotherapy with Daunorubicin/Cytarabine and HIDAC consolidation chemotherapy, now admitted for Haplo-identical stem cell transplant from son. Conditioning regimen of Fludarabine, Cytoxan, and TBI. Other history includes non-ischemic cardiomyopathy (recent EF 25% 9/19/19), COLLEEN, DVT/PE, HTN, Factor V Leiden and amputation of right toe due to osteomyelitis.  Day + 15.  Febrile likely due to haplostorm,  s/p CTX 2/2.  Follow temps if persist can dose 1 mg/kg of solumedrol.  Blood/urine cultures from 10/10 and 10/12 negative.  Continue cefepime/voriconazole for now along with acyclovir prophylaxis.   Strict I&O, daily weights, prn diuresis   Renal insufficiency- monitor daily creatinine, normal today.   Started  Zarxio on day +5,  MMF and Tacrolimus. Check Tacrolimus levels on Monday and Thursday.   Anxiolytics, antinausea, antidiarrhea medications as needed.  Nutritional support.  Hx non-ischemic CM, followed by Dr. Luke Lyn, last Echo 9/19 (EF 25%).  Monitor for fluid overload.  VOD prophylaxis - low dose heparin gtt (dosed at 100 units / kg / day), glutamine supplementation, Actigall BID   GI prophylaxis - Protonix po QD.  Electrolyte support  Aggressive mouth care and skin care as per protocol.  OOB 59 year old male, with previously diagnosed acute myeloid leukemia. s/p induction chemotherapy with Daunorubicin/Cytarabine and HIDAC consolidation chemotherapy, now admitted for Haplo-identical stem cell transplant from son. Conditioning regimen of Fludarabine, Cytoxan, and TBI. Other history includes non-ischemic cardiomyopathy (recent EF 25% 9/19/19), COLLEEN, DVT/PE, HTN, Factor V Leiden and amputation of right toe due to osteomyelitis.  Day + 15, s/p haploPBSCT, await engraftment  Started  Zarxio on day +5,  MMF and Tacrolimus. Check Tacrolimus levels on Monday and Thursday.   Prior fevers likely due to haplostorm,  s/p CTX 2/2.  Follow temps if persist can dose 1 mg/kg of solumedrol.  Blood/urine cultures from 10/10 and 10/12 negative.  Continue cefepime/voriconazole for now along with acyclovir prophylaxis.   Strict I&O, daily weights, prn diuresis   Renal insufficiency- monitor daily creatinine, normal today.   Anxiolytics, antinausea, antidiarrhea medications as needed.  Nutritional support.  Hx non-ischemic CM, followed by Dr. Luke Lyn, last Echo 9/19 (EF 25%).  Monitor for fluid overload.  VOD prophylaxis - low dose heparin gtt (dosed at 100 units / kg / day), glutamine supplementation, Actigall BID   GI prophylaxis - Protonix po QD.  Electrolyte support  Aggressive mouth care and skin care as per protocol.  OOB/ambulate

## 2019-10-24 NOTE — PROGRESS NOTE ADULT - ASSESSMENT
59 year old male, with previously diagnosed acute myeloid leukemia. s/p induction chemotherapy with Daunorubicin/Cytarabine and HIDAC consolidation chemotherapy, now admitted for Haplo-identical stem cell transplant from son:    - No evidence of volume overload at present.  He is on fluids and has been getting IV Lasix prn to maintain net negative.  His recent CXR is clear.  - Continue to maintain strict I's and O's, and to monitor for signs of volume overload, which he is at risk for.  - No evidence of acute ischemia  - BP is overall stable  - Continue ACEI  and BB at current doses as tolerated by BP.    - On heparin gtt for hx of dvt/pe and hypercoagulable state, and adjust rate per protocol. Monitor platelet count which is now 17.  No bleeding, though is clearly at risk.  - Monitor and replete lytes  - To follow while admitted    NOT YET EVALUATED. NOTE WRITTEN BASED ON PLANNED ASSESSMENT 59 year old male, with previously diagnosed acute myeloid leukemia. s/p induction chemotherapy with Daunorubicin/Cytarabine and HIDAC consolidation chemotherapy, now admitted for Haplo-identical stem cell transplant from son:    - No evidence of volume overload at present.  He is on fluids and has been getting IV Lasix prn to maintain net negative.  His recent CXR is clear.  - Continue to maintain strict I's and O's, and to monitor for signs of volume overload, which he is at risk for.  - No evidence of acute ischemia  - BP is overall stable  - Continue ACEI  and BB at current doses as tolerated by BP.    - On heparin gtt for hx of dvt/pe and hypercoagulable state, and adjust rate per protocol. Monitor platelet count which is 17 as of 10/23.  No bleeding, though is clearly at risk.  - Monitor and replete lytes  - To follow while admitted

## 2019-10-24 NOTE — PROGRESS NOTE ADULT - SUBJECTIVE AND OBJECTIVE BOX
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     S: Patient seen and examined with HPC Transplant Team:     Denies mouth / tongue / throat pain, dyspnea, cough, nausea, vomiting, diarrhea, abdominal pain     O: Vitals:   Vital Signs Last 24 Hrs  T(C): 36.3 (24 Oct 2019 05:30), Max: 36.8 (23 Oct 2019 14:00)  T(F): 97.3 (24 Oct 2019 05:30), Max: 98.2 (23 Oct 2019 14:00)  HR: 80 (24 Oct 2019 05:30) (71 - 83)  BP: 128/76 (24 Oct 2019 05:30) (128/76 - 138/88)  BP(mean): --  RR: 18 (24 Oct 2019 05:30) (17 - 18)  SpO2: 96% (24 Oct 2019 05:30) (96% - 99%)    Admit weight: 81.5 kg  Daily Weight in k.5 (23 Oct 2019 10:06)  Today's weight:     Intake / Output:   10-23 @ :01  -  10-24 @ 07:00  --------------------------------------------------------  IN: 1453 mL / OUT: 2050 mL / NET: -597 mL    10-24 @ 07:01  -  10-24 @ 09:07  --------------------------------------------------------  IN: 32.8 mL / OUT: 0 mL / NET: 32.8 mL      PE:   Oropharynx: no ulceration or erythema  Oral Mucositis:   -                                             Grade: -  CVS: RRR, normal S1 S2  Lungs: CTA bilaterally   Abdomen: soft, + normoactive BS, NT, ND  Extremities: warm, no edema. Toe amputation  Gastric Mucositis:     -                                             Grade: -  Intestinal Mucositis:      -                                        Grade: -  Skin: patchy erythematous rash back and upper chest  TLC:  C/D/I  Neuro: Alert and oriented x 3  Pain: none     Labs:   CBC Full  -  ( 24 Oct 2019 06:53 )  WBC Count : <0.10 K/uL  Hemoglobin : 7.7 g/dL  Hematocrit : 23.5 %  Platelet Count - Automated : 12 K/uL  Mean Cell Volume : 88.0 fl  Mean Cell Hemoglobin : 28.8 pg  Mean Cell Hemoglobin Concentration : 32.8 gm/dL  Auto Neutrophil # : x  Auto Lymphocyte # : x  Auto Monocyte # : x  Auto Eosinophil # : x  Auto Basophil # : x  Auto Neutrophil % : x  Auto Lymphocyte % : x  Auto Monocyte % : x  Auto Eosinophil % : x  Auto Basophil % : x                          7.7    <0.10 )-----------( 12       ( 24 Oct 2019 06:53 )             23.5     10-    139  |  104  |  15  ----------------------------<  98  4.4   |  24  |  0.92    Ca    9.2      24 Oct 2019 06:53  Phos  3.8     10-  Mg     1.7     10-24    TPro  5.8<L>  /  Alb  3.5  /  TBili  0.4  /  DBili  x   /  AST  9<L>  /  ALT  8<L>  /  AlkPhos  70  10-24    PT/INR - ( 24 Oct 2019 06:53 )   PT: 13.5 sec;   INR: 1.18 ratio         PTT - ( 24 Oct 2019 06:53 )  PTT:30.0 sec  LIVER FUNCTIONS - ( 24 Oct 2019 06:53 )  Alb: 3.5 g/dL / Pro: 5.8 g/dL / ALK PHOS: 70 U/L / ALT: 8 U/L / AST: 9 U/L / GGT: x           Lactate Dehydrogenase, Serum: 105 U/L (10-24 @ 06:53)    Cultures:   Culture - Blood (10.12.19 @ 22:28)    Specimen Source: .Blood    Culture Results:   No growth at 5 days.    Culture - Urine (10.13.19 @ 04:27)    Specimen Source: .Urine    Culture Results:   No growth    Radiology:    Xray Chest 1 View- PORTABLE-Routine (10.12.19 @ 09:00) >  Clear lungs.    Meds:   Antimicrobials:   acyclovir   Oral Tab/Cap 400 milliGRAM(s) Oral every 8 hours  cefepime   IVPB 2000 milliGRAM(s) IV Intermittent every 8 hours  clotrimazole Lozenge 1 Lozenge Oral five times a day  voriconazole 200 milliGRAM(s) Oral every 12 hours      Heme / Onc:   heparin  Infusion 339 Unit(s)/Hr IV Continuous <Continuous>      GI:  docusate sodium 100 milliGRAM(s) Oral three times a day  pantoprazole    Tablet 40 milliGRAM(s) Oral before breakfast  senna 2 Tablet(s) Oral at bedtime  sodium bicarbonate Mouth Rinse 10 milliLiter(s) Swish and Spit five times a day  ursodiol Capsule 300 milliGRAM(s) Oral two times a day with meals      Cardiovascular:   enalapril 2.5 milliGRAM(s) Oral daily  metoprolol succinate ER 12.5 milliGRAM(s) Oral two times a day  tamsulosin 0.4 milliGRAM(s) Oral at bedtime      Immunologic:   filgrastim-sndz Injectable 480 MICROGram(s) SubCutaneous daily  immune   globulin 10% (GAMMAGARD) IVPB 30 Gram(s) IV Intermittent <User Schedule>  mycophenolate mofetil 1000 milliGRAM(s) Oral <User Schedule>  tacrolimus 1.5 milliGRAM(s) Oral two times a day      Other medications:   acetaminophen   Tablet .. 650 milliGRAM(s) Oral every 6 hours  acetaminophen   Tablet .. 650 milliGRAM(s) Oral <User Schedule>  ALPRAZolam 1 milliGRAM(s) Oral at bedtime  Biotene Dry Mouth Oral Rinse 5 milliLiter(s) Swish and Spit five times a day  chlorhexidine 4% Liquid 1 Application(s) Topical <User Schedule>  diphenhydrAMINE   Injectable 25 milliGRAM(s) IV Push <User Schedule>  finasteride 5 milliGRAM(s) Oral daily  folic acid 1 milliGRAM(s) Oral daily  lidocaine/prilocaine Cream 1 Application(s) Topical daily  magnesium oxide 400 milliGRAM(s) Oral three times a day with meals  multivitamin 1 Tablet(s) Oral daily  sertraline 50 milliGRAM(s) Oral at bedtime  sodium chloride 0.9%. 1000 milliLiter(s) IV Continuous <Continuous>      PRN:   acetaminophen   Tablet .. 650 milliGRAM(s) Oral every 6 hours PRN  acetaminophen  IVPB .. 1000 milliGRAM(s) IV Intermittent once PRN  diphenhydrAMINE   Injectable 25 milliGRAM(s) IV Push every 4 hours PRN  hydrocortisone sodium succinate Injectable 50 milliGRAM(s) IV Push once PRN  metoclopramide Injectable 10 milliGRAM(s) IV Push every 6 hours PRN  ondansetron Injectable 8 milliGRAM(s) IV Push every 8 hours PRN  sodium chloride 0.9% lock flush 10 milliLiter(s) IV Push every 1 hour PRN    A/P:   59 year old male with a history of AML  Post Allogeneic PBSCT day +14  Strict I&O, daily weights, prn diuresis   Cardiology following   10/14- Started Tacro, Cellcept and Zarxio  Afebrile, neutropenic - continue Cefepime.  10/11- Patient will need split units of  PRBC if need to be transfused due to low EF.  10/23-  Platelets transfusion reaction yesterday, transfusion workup in process.    1. Infectious Disease:   acyclovir   Oral Tab/Cap 400 milliGRAM(s) Oral every 8 hours  cefepime   IVPB 2000 milliGRAM(s) IV Intermittent every 8 hours  clotrimazole Lozenge 1 Lozenge Oral five times a day  voriconazole 200 milliGRAM(s) Oral every 12 hours    2. VOD Prophylaxis: Actigall, Glutamine, Heparin (dosed at 100 units / kg / day)     3. GI Prophylaxis:    pantoprazole    Tablet 40 milliGRAM(s) Oral before breakfast    4. Mouthcare - NS / NaHCO3 rinses, Mycelex, Biotene; Skin care     5. GVHD prophylaxis   mycophenolate mofetil 1000 milliGRAM(s) Oral <User Schedule>  tacrolimus 1.5 milliGRAM(s) Oral two times a day  CTX days + 3, + 4     6. Transfuse & replete electrolytes prn     7. IV hydration, daily weights, strict I&O, prn diuresis     8. PO intake as tolerated, nutrition follow up as needed, MVI, folic acid     9. Antiemetics, anti-diarrhea medications:        10. OOB as tolerated, physical therapy consult if needed     11. Monitor coags / fibrinogen 2x week, vitamin K as needed     12. Monitor closely for clinical changes, monitor for fevers     13. Emotional support provided, plan of care discussed and questions addressed     14. Patient education done regarding chemotherapy prep, plan of care, restrictions and discharge planning     15. Continue regular social work input     I have written the above note for Dr. Quiñones who performed service with me in the room.   Susan Grey NP-C ([phone])    I have seen and examined patient with NP, I agree with above note as scribed. 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     S: Patient seen and examined with HPC Transplant Team:     Denies mouth / tongue / throat pain, dyspnea, cough, nausea, vomiting, diarrhea, abdominal pain     O: Vitals:   Vital Signs Last 24 Hrs  T(C): 36.3 (24 Oct 2019 05:30), Max: 36.8 (23 Oct 2019 14:00)  T(F): 97.3 (24 Oct 2019 05:30), Max: 98.2 (23 Oct 2019 14:00)  HR: 80 (24 Oct 2019 05:30) (71 - 83)  BP: 128/76 (24 Oct 2019 05:30) (128/76 - 138/88)  BP(mean): --  RR: 18 (24 Oct 2019 05:30) (17 - 18)  SpO2: 96% (24 Oct 2019 05:30) (96% - 99%)    Admit weight: 81.5 kg  Daily Weight in k.5 (23 Oct 2019 10:06)  Today's weight:     Intake / Output:   10-23 @ :01  -  10-24 @ 07:00  --------------------------------------------------------  IN: 1453 mL / OUT: 2050 mL / NET: -597 mL    10-24 @ 07:01  -  10-24 @ 09:07  --------------------------------------------------------  IN: 32.8 mL / OUT: 0 mL / NET: 32.8 mL      PE:   Oropharynx: no ulceration or erythema  Oral Mucositis:   -                                             Grade: -  CVS: RRR, normal S1 S2  Lungs: CTA bilaterally   Abdomen: soft, + normoactive BS, NT, ND  Extremities: warm, no edema. Toe amputation  Gastric Mucositis:     -                                             Grade: -  Intestinal Mucositis:      -                                        Grade: -  Skin: patchy erythematous rash back and upper chest  TLC:  C/D/I  Neuro: Alert and oriented x 3  Pain: none     Labs:   CBC Full  -  ( 24 Oct 2019 06:53 )  WBC Count : <0.10 K/uL  Hemoglobin : 7.7 g/dL  Hematocrit : 23.5 %  Platelet Count - Automated : 12 K/uL  Mean Cell Volume : 88.0 fl  Mean Cell Hemoglobin : 28.8 pg  Mean Cell Hemoglobin Concentration : 32.8 gm/dL  Auto Neutrophil # : x  Auto Lymphocyte # : x  Auto Monocyte # : x  Auto Eosinophil # : x  Auto Basophil # : x  Auto Neutrophil % : x  Auto Lymphocyte % : x  Auto Monocyte % : x  Auto Eosinophil % : x  Auto Basophil % : x                          7.7    <0.10 )-----------( 12       ( 24 Oct 2019 06:53 )             23.5     10-    139  |  104  |  15  ----------------------------<  98  4.4   |  24  |  0.92    Ca    9.2      24 Oct 2019 06:53  Phos  3.8     10-  Mg     1.7     10-24    TPro  5.8<L>  /  Alb  3.5  /  TBili  0.4  /  DBili  x   /  AST  9<L>  /  ALT  8<L>  /  AlkPhos  70  10-24    PT/INR - ( 24 Oct 2019 06:53 )   PT: 13.5 sec;   INR: 1.18 ratio         PTT - ( 24 Oct 2019 06:53 )  PTT:30.0 sec  LIVER FUNCTIONS - ( 24 Oct 2019 06:53 )  Alb: 3.5 g/dL / Pro: 5.8 g/dL / ALK PHOS: 70 U/L / ALT: 8 U/L / AST: 9 U/L / GGT: x           Lactate Dehydrogenase, Serum: 105 U/L (10-24 @ 06:53)    Cultures:   Culture - Blood (10.12.19 @ 22:28)    Specimen Source: .Blood    Culture Results:   No growth at 5 days.    Culture - Urine (10.13.19 @ 04:27)    Specimen Source: .Urine    Culture Results:   No growth    Radiology:    Xray Chest 1 View- PORTABLE-Routine (10.12.19 @ 09:00) >  Clear lungs.    Meds:   Antimicrobials:   acyclovir   Oral Tab/Cap 400 milliGRAM(s) Oral every 8 hours  cefepime   IVPB 2000 milliGRAM(s) IV Intermittent every 8 hours  clotrimazole Lozenge 1 Lozenge Oral five times a day  voriconazole 200 milliGRAM(s) Oral every 12 hours      Heme / Onc:   heparin  Infusion 339 Unit(s)/Hr IV Continuous <Continuous>      GI:  docusate sodium 100 milliGRAM(s) Oral three times a day  pantoprazole    Tablet 40 milliGRAM(s) Oral before breakfast  senna 2 Tablet(s) Oral at bedtime  sodium bicarbonate Mouth Rinse 10 milliLiter(s) Swish and Spit five times a day  ursodiol Capsule 300 milliGRAM(s) Oral two times a day with meals      Cardiovascular:   enalapril 2.5 milliGRAM(s) Oral daily  metoprolol succinate ER 12.5 milliGRAM(s) Oral two times a day  tamsulosin 0.4 milliGRAM(s) Oral at bedtime      Immunologic:   filgrastim-sndz Injectable 480 MICROGram(s) SubCutaneous daily  immune   globulin 10% (GAMMAGARD) IVPB 30 Gram(s) IV Intermittent <User Schedule>  mycophenolate mofetil 1000 milliGRAM(s) Oral <User Schedule>  tacrolimus 1.5 milliGRAM(s) Oral two times a day      Other medications:   acetaminophen   Tablet .. 650 milliGRAM(s) Oral every 6 hours  acetaminophen   Tablet .. 650 milliGRAM(s) Oral <User Schedule>  ALPRAZolam 1 milliGRAM(s) Oral at bedtime  Biotene Dry Mouth Oral Rinse 5 milliLiter(s) Swish and Spit five times a day  chlorhexidine 4% Liquid 1 Application(s) Topical <User Schedule>  diphenhydrAMINE   Injectable 25 milliGRAM(s) IV Push <User Schedule>  finasteride 5 milliGRAM(s) Oral daily  folic acid 1 milliGRAM(s) Oral daily  lidocaine/prilocaine Cream 1 Application(s) Topical daily  magnesium oxide 400 milliGRAM(s) Oral three times a day with meals  multivitamin 1 Tablet(s) Oral daily  sertraline 50 milliGRAM(s) Oral at bedtime  sodium chloride 0.9%. 1000 milliLiter(s) IV Continuous <Continuous>      PRN:   acetaminophen   Tablet .. 650 milliGRAM(s) Oral every 6 hours PRN  acetaminophen  IVPB .. 1000 milliGRAM(s) IV Intermittent once PRN  diphenhydrAMINE   Injectable 25 milliGRAM(s) IV Push every 4 hours PRN  hydrocortisone sodium succinate Injectable 50 milliGRAM(s) IV Push once PRN  metoclopramide Injectable 10 milliGRAM(s) IV Push every 6 hours PRN  ondansetron Injectable 8 milliGRAM(s) IV Push every 8 hours PRN  sodium chloride 0.9% lock flush 10 milliLiter(s) IV Push every 1 hour PRN    A/P:   59 year old male with a history of AML  Post Allogeneic PBSCT day +14  Strict I&O, daily weights, prn diuresis   Cardiology following   10/14- Started Tacro, Cellcept and Zarxio  Afebrile, neutropenic - continue Cefepime.  10/11- Patient will need split units of  PRBC if need to be transfused due to low EF.  10/23-  Platelets transfusion reaction yesterday, transfusion workup in process.    1. Infectious Disease:   acyclovir   Oral Tab/Cap 400 milliGRAM(s) Oral every 8 hours  cefepime   IVPB 2000 milliGRAM(s) IV Intermittent every 8 hours  clotrimazole Lozenge 1 Lozenge Oral five times a day  voriconazole 200 milliGRAM(s) Oral every 12 hours    2. VOD Prophylaxis: Actigall, Glutamine, Heparin (dosed at 100 units / kg / day)     3. GI Prophylaxis:    pantoprazole    Tablet 40 milliGRAM(s) Oral before breakfast    4. Mouthcare - NS / NaHCO3 rinses, Mycelex, Biotene; Skin care     5. GVHD prophylaxis   mycophenolate mofetil 1000 milliGRAM(s) Oral <User Schedule>  tacrolimus 1.5 milliGRAM(s) Oral two times a day  CTX days + 3, + 4     6. Transfuse & replete electrolytes prn   magnesium oxide 400 milliGRAM(s) Oral three times a day with meals    7. IV hydration, daily weights, strict I&O, prn diuresis     8. PO intake as tolerated, nutrition follow up as needed, MVI, folic acid     9. Antiemetics, anti-diarrhea medications:   metoclopramide Injectable 10 milliGRAM(s) IV Push every 6 hours PRN  ondansetron Injectable 8 milliGRAM(s) IV Push every 8 hours PRN    10. OOB as tolerated, physical therapy consult if needed     11. Monitor coags / fibrinogen 2x week, vitamin K as needed     12. Monitor closely for clinical changes, monitor for fevers     13. Emotional support provided, plan of care discussed and questions addressed     14. Patient education done regarding chemotherapy prep, plan of care, restrictions and discharge planning     15. Continue regular social work input     I have written the above note for Dr. Quiñones who performed service with me in the room.   Susan Grey NP-C ([phone])    I have seen and examined patient with NP, I agree with above note as scribed. 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     S: Patient seen and examined with HPC Transplant Team:   + gingival irritation - removed dentures   + rash   Denies tongue / throat pain, dyspnea, cough, nausea, vomiting, diarrhea, abdominal pain     O: Vitals:   Vital Signs Last 24 Hrs  T(C): 36.3 (24 Oct 2019 05:30), Max: 36.8 (23 Oct 2019 14:00)  T(F): 97.3 (24 Oct 2019 05:30), Max: 98.2 (23 Oct 2019 14:00)  HR: 80 (24 Oct 2019 05:30) (71 - 83)  BP: 128/76 (24 Oct 2019 05:30) (128/76 - 138/88)  BP(mean): --  RR: 18 (24 Oct 2019 05:30) (17 - 18)  SpO2: 96% (24 Oct 2019 05:30) (96% - 99%)    Admit weight: 81.5 kg  Daily Weight in k.5 (23 Oct 2019 10:06)  Today's weight: 79.9kg     Intake / Output:   10-23 @ 07:01  -  10-24 @ 07:00  --------------------------------------------------------  IN: 1453 mL / OUT: 2050 mL / NET: -597 mL    10-24 @ 07:01  -  10-24 @ 09:07  --------------------------------------------------------  IN: 32.8 mL / OUT: 0 mL / NET: 32.8 mL      PE:   Oropharynx: no ulceration or erythema  Oral Mucositis:   -                                             Grade: -  CVS: RRR, normal S1 S2  Lungs: CTA bilaterally   Abdomen: soft, + normoactive BS, NT, ND  Extremities: warm, no edema. Toe amputation  Gastric Mucositis:     -                                             Grade: -  Intestinal Mucositis:      -                                        Grade: -  Skin: patchy erythematous rash back and upper chest  TLC:  C/D/I  Neuro: Alert and oriented x 3  Pain: none     Labs:   CBC Full  -  ( 24 Oct 2019 06:53 )  WBC Count : <0.10 K/uL  Hemoglobin : 7.7 g/dL  Hematocrit : 23.5 %  Platelet Count - Automated : 12 K/uL  Mean Cell Volume : 88.0 fl  Mean Cell Hemoglobin : 28.8 pg  Mean Cell Hemoglobin Concentration : 32.8 gm/dL  Auto Neutrophil # : x  Auto Lymphocyte # : x  Auto Monocyte # : x  Auto Eosinophil # : x  Auto Basophil # : x  Auto Neutrophil % : x  Auto Lymphocyte % : x  Auto Monocyte % : x  Auto Eosinophil % : x  Auto Basophil % : x                          7.7    <0.10 )-----------( 12       ( 24 Oct 2019 06:53 )             23.5     10-24    139  |  104  |  15  ----------------------------<  98  4.4   |  24  |  0.92    Ca    9.2      24 Oct 2019 06:53  Phos  3.8     10-  Mg     1.7     10-24    TPro  5.8<L>  /  Alb  3.5  /  TBili  0.4  /  DBili  x   /  AST  9<L>  /  ALT  8<L>  /  AlkPhos  70  10-24    PT/INR - ( 24 Oct 2019 06:53 )   PT: 13.5 sec;   INR: 1.18 ratio         PTT - ( 24 Oct 2019 06:53 )  PTT:30.0 sec  LIVER FUNCTIONS - ( 24 Oct 2019 06:53 )  Alb: 3.5 g/dL / Pro: 5.8 g/dL / ALK PHOS: 70 U/L / ALT: 8 U/L / AST: 9 U/L / GGT: x           Lactate Dehydrogenase, Serum: 105 U/L (10-24 @ 06:53)    Cultures:   Culture - Blood (10.12.19 @ 22:28)    Specimen Source: .Blood    Culture Results:   No growth at 5 days.    Culture - Urine (10.13.19 @ 04:27)    Specimen Source: .Urine    Culture Results:   No growth    Radiology:    Xray Chest 1 View- PORTABLE-Routine (10.12.19 @ 09:00) >  Clear lungs.    Meds:   Antimicrobials:   acyclovir   Oral Tab/Cap 400 milliGRAM(s) Oral every 8 hours  cefepime   IVPB 2000 milliGRAM(s) IV Intermittent every 8 hours  clotrimazole Lozenge 1 Lozenge Oral five times a day  voriconazole 200 milliGRAM(s) Oral every 12 hours      Heme / Onc:   heparin  Infusion 339 Unit(s)/Hr IV Continuous <Continuous>      GI:  docusate sodium 100 milliGRAM(s) Oral three times a day  pantoprazole    Tablet 40 milliGRAM(s) Oral before breakfast  senna 2 Tablet(s) Oral at bedtime  sodium bicarbonate Mouth Rinse 10 milliLiter(s) Swish and Spit five times a day  ursodiol Capsule 300 milliGRAM(s) Oral two times a day with meals      Cardiovascular:   enalapril 2.5 milliGRAM(s) Oral daily  metoprolol succinate ER 12.5 milliGRAM(s) Oral two times a day  tamsulosin 0.4 milliGRAM(s) Oral at bedtime      Immunologic:   filgrastim-sndz Injectable 480 MICROGram(s) SubCutaneous daily  immune   globulin 10% (GAMMAGARD) IVPB 30 Gram(s) IV Intermittent <User Schedule>  mycophenolate mofetil 1000 milliGRAM(s) Oral <User Schedule>  tacrolimus 1.5 milliGRAM(s) Oral two times a day      Other medications:   acetaminophen   Tablet .. 650 milliGRAM(s) Oral every 6 hours  acetaminophen   Tablet .. 650 milliGRAM(s) Oral <User Schedule>  ALPRAZolam 1 milliGRAM(s) Oral at bedtime  Biotene Dry Mouth Oral Rinse 5 milliLiter(s) Swish and Spit five times a day  chlorhexidine 4% Liquid 1 Application(s) Topical <User Schedule>  diphenhydrAMINE   Injectable 25 milliGRAM(s) IV Push <User Schedule>  finasteride 5 milliGRAM(s) Oral daily  folic acid 1 milliGRAM(s) Oral daily  lidocaine/prilocaine Cream 1 Application(s) Topical daily  magnesium oxide 400 milliGRAM(s) Oral three times a day with meals  multivitamin 1 Tablet(s) Oral daily  sertraline 50 milliGRAM(s) Oral at bedtime  sodium chloride 0.9%. 1000 milliLiter(s) IV Continuous <Continuous>      PRN:   acetaminophen   Tablet .. 650 milliGRAM(s) Oral every 6 hours PRN  acetaminophen  IVPB .. 1000 milliGRAM(s) IV Intermittent once PRN  diphenhydrAMINE   Injectable 25 milliGRAM(s) IV Push every 4 hours PRN  hydrocortisone sodium succinate Injectable 50 milliGRAM(s) IV Push once PRN  metoclopramide Injectable 10 milliGRAM(s) IV Push every 6 hours PRN  ondansetron Injectable 8 milliGRAM(s) IV Push every 8 hours PRN  sodium chloride 0.9% lock flush 10 milliLiter(s) IV Push every 1 hour PRN    A/P:   59 year old male with a history of AML  Post Allogeneic PBSCT day +14  Strict I&O, daily weights, prn diuresis   Cardiology following   10/14- Started Tacro, Cellcept and Zarxio  Afebrile, neutropenic - continue Cefepime.  10/11- Patient will need split units of  PRBC if need to be transfused due to low EF.  10/23-  Platelets transfusion reaction yesterday, transfusion workup in process.    1. Infectious Disease:   acyclovir   Oral Tab/Cap 400 milliGRAM(s) Oral every 8 hours  cefepime   IVPB 2000 milliGRAM(s) IV Intermittent every 8 hours  clotrimazole Lozenge 1 Lozenge Oral five times a day  voriconazole 200 milliGRAM(s) Oral every 12 hours    2. VOD Prophylaxis: Actigall, Glutamine, Heparin (dosed at 100 units / kg / day)     3. GI Prophylaxis:    pantoprazole    Tablet 40 milliGRAM(s) Oral before breakfast    4. Mouthcare - NS / NaHCO3 rinses, Mycelex, Biotene; Skin care     5. GVHD prophylaxis   mycophenolate mofetil 1000 milliGRAM(s) Oral <User Schedule>  tacrolimus 1.5 milliGRAM(s) Oral two times a day  CTX days + 3, + 4     6. Transfuse & replete electrolytes prn   magnesium oxide 400 milliGRAM(s) Oral three times a day with meals    7. IV hydration, daily weights, strict I&O, prn diuresis     8. PO intake as tolerated, nutrition follow up as needed, MVI, folic acid     9. Antiemetics, anti-diarrhea medications:   metoclopramide Injectable 10 milliGRAM(s) IV Push every 6 hours PRN  ondansetron Injectable 8 milliGRAM(s) IV Push every 8 hours PRN    10. OOB as tolerated, physical therapy consult if needed     11. Monitor coags / fibrinogen 2x week, vitamin K as needed     12. Monitor closely for clinical changes, monitor for fevers     13. Emotional support provided, plan of care discussed and questions addressed     14. Patient education done regarding chemotherapy prep, plan of care, restrictions and discharge planning     15. Continue regular social work input     I have written the above note for Dr. Quiñones who performed service with me in the room.   Susan Grey NP-C ([phone])    I have seen and examined patient with NP, I agree with above note as scribed. 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     S: Patient seen and examined with HPC Transplant Team:   + gingival irritation - removed dentures   + rash   Denies tongue / throat pain, dyspnea, cough, nausea, vomiting, diarrhea, abdominal pain     O: Vitals:   Vital Signs Last 24 Hrs  T(C): 36.3 (24 Oct 2019 05:30), Max: 36.8 (23 Oct 2019 14:00)  T(F): 97.3 (24 Oct 2019 05:30), Max: 98.2 (23 Oct 2019 14:00)  HR: 80 (24 Oct 2019 05:30) (71 - 83)  BP: 128/76 (24 Oct 2019 05:30) (128/76 - 138/88)  BP(mean): --  RR: 18 (24 Oct 2019 05:30) (17 - 18)  SpO2: 96% (24 Oct 2019 05:30) (96% - 99%)    Admit weight: 81.5 kg  Daily Weight in k.5 (23 Oct 2019 10:06)  Today's weight: 79.9kg     Intake / Output:   10-23 @ 07:01  -  10-24 @ 07:00  --------------------------------------------------------  IN: 1453 mL / OUT: 2050 mL / NET: -597 mL    10-24 @ 07:01  -  10-24 @ 09:07  --------------------------------------------------------  IN: 32.8 mL / OUT: 0 mL / NET: 32.8 mL      PE:   Oropharynx: no ulceration or erythema  Oral Mucositis:   -                                             Grade: -  CVS: RRR, normal S1 S2  Lungs: CTA bilaterally   Abdomen: soft, + normoactive BS, NT, ND  Extremities: warm, no edema. Toe amputation  Gastric Mucositis:     -                                             Grade: -  Intestinal Mucositis:      -                                        Grade: -  Skin: patchy erythematous rash back and upper chest  TLC:  C/D/I  Neuro: Alert and oriented x 3  Pain: none     Labs:   CBC Full  -  ( 24 Oct 2019 06:53 )  WBC Count : <0.10 K/uL  Hemoglobin : 7.7 g/dL  Hematocrit : 23.5 %  Platelet Count - Automated : 12 K/uL  Mean Cell Volume : 88.0 fl  Mean Cell Hemoglobin : 28.8 pg  Mean Cell Hemoglobin Concentration : 32.8 gm/dL  Auto Neutrophil # : x  Auto Lymphocyte # : x  Auto Monocyte # : x  Auto Eosinophil # : x  Auto Basophil # : x  Auto Neutrophil % : x  Auto Lymphocyte % : x  Auto Monocyte % : x  Auto Eosinophil % : x  Auto Basophil % : x                          7.7    <0.10 )-----------( 12       ( 24 Oct 2019 06:53 )             23.5     10-24    139  |  104  |  15  ----------------------------<  98  4.4   |  24  |  0.92    Ca    9.2      24 Oct 2019 06:53  Phos  3.8     10-  Mg     1.7     10-24    TPro  5.8<L>  /  Alb  3.5  /  TBili  0.4  /  DBili  x   /  AST  9<L>  /  ALT  8<L>  /  AlkPhos  70  10-24    PT/INR - ( 24 Oct 2019 06:53 )   PT: 13.5 sec;   INR: 1.18 ratio         PTT - ( 24 Oct 2019 06:53 )  PTT:30.0 sec  LIVER FUNCTIONS - ( 24 Oct 2019 06:53 )  Alb: 3.5 g/dL / Pro: 5.8 g/dL / ALK PHOS: 70 U/L / ALT: 8 U/L / AST: 9 U/L / GGT: x           Lactate Dehydrogenase, Serum: 105 U/L (10-24 @ 06:53)    Cultures:   Culture - Blood (10.12.19 @ 22:28)    Specimen Source: .Blood    Culture Results:   No growth at 5 days.    Culture - Urine (10.13.19 @ 04:27)    Specimen Source: .Urine    Culture Results:   No growth    Radiology:    Xray Chest 1 View- PORTABLE-Routine (10.12.19 @ 09:00) >  Clear lungs.    Meds:   Antimicrobials:   acyclovir   Oral Tab/Cap 400 milliGRAM(s) Oral every 8 hours  cefepime   IVPB 2000 milliGRAM(s) IV Intermittent every 8 hours  clotrimazole Lozenge 1 Lozenge Oral five times a day  voriconazole 200 milliGRAM(s) Oral every 12 hours      Heme / Onc:   heparin  Infusion 339 Unit(s)/Hr IV Continuous <Continuous>      GI:  docusate sodium 100 milliGRAM(s) Oral three times a day  pantoprazole    Tablet 40 milliGRAM(s) Oral before breakfast  senna 2 Tablet(s) Oral at bedtime  sodium bicarbonate Mouth Rinse 10 milliLiter(s) Swish and Spit five times a day  ursodiol Capsule 300 milliGRAM(s) Oral two times a day with meals      Cardiovascular:   enalapril 2.5 milliGRAM(s) Oral daily  metoprolol succinate ER 12.5 milliGRAM(s) Oral two times a day  tamsulosin 0.4 milliGRAM(s) Oral at bedtime      Immunologic:   filgrastim-sndz Injectable 480 MICROGram(s) SubCutaneous daily  immune   globulin 10% (GAMMAGARD) IVPB 30 Gram(s) IV Intermittent <User Schedule>  mycophenolate mofetil 1000 milliGRAM(s) Oral <User Schedule>  tacrolimus 1.5 milliGRAM(s) Oral two times a day      Other medications:   acetaminophen   Tablet .. 650 milliGRAM(s) Oral every 6 hours  acetaminophen   Tablet .. 650 milliGRAM(s) Oral <User Schedule>  ALPRAZolam 1 milliGRAM(s) Oral at bedtime  Biotene Dry Mouth Oral Rinse 5 milliLiter(s) Swish and Spit five times a day  chlorhexidine 4% Liquid 1 Application(s) Topical <User Schedule>  diphenhydrAMINE   Injectable 25 milliGRAM(s) IV Push <User Schedule>  finasteride 5 milliGRAM(s) Oral daily  folic acid 1 milliGRAM(s) Oral daily  lidocaine/prilocaine Cream 1 Application(s) Topical daily  magnesium oxide 400 milliGRAM(s) Oral three times a day with meals  multivitamin 1 Tablet(s) Oral daily  sertraline 50 milliGRAM(s) Oral at bedtime  sodium chloride 0.9%. 1000 milliLiter(s) IV Continuous <Continuous>      PRN:   acetaminophen   Tablet .. 650 milliGRAM(s) Oral every 6 hours PRN  acetaminophen  IVPB .. 1000 milliGRAM(s) IV Intermittent once PRN  diphenhydrAMINE   Injectable 25 milliGRAM(s) IV Push every 4 hours PRN  hydrocortisone sodium succinate Injectable 50 milliGRAM(s) IV Push once PRN  metoclopramide Injectable 10 milliGRAM(s) IV Push every 6 hours PRN  ondansetron Injectable 8 milliGRAM(s) IV Push every 8 hours PRN  sodium chloride 0.9% lock flush 10 milliLiter(s) IV Push every 1 hour PRN    A/P:   59 year old male with a history of AML  Post Allogeneic PBSCT day +15  Strict I&O, daily weights, prn diuresis   Cardiology following   10/14- Started Tacro, Cellcept and Zarxio  Afebrile, neutropenic - continue Cefepime.  10/11- Patient will need split units of  PRBC if need to be transfused due to low EF.  10/23-  Platelets transfusion reaction yesterday, transfusion workup in process.    1. Infectious Disease:   acyclovir   Oral Tab/Cap 400 milliGRAM(s) Oral every 8 hours  cefepime   IVPB 2000 milliGRAM(s) IV Intermittent every 8 hours  clotrimazole Lozenge 1 Lozenge Oral five times a day  voriconazole 200 milliGRAM(s) Oral every 12 hours    2. VOD Prophylaxis: Actigall, Glutamine, Heparin (dosed at 100 units / kg / day)     3. GI Prophylaxis:    pantoprazole    Tablet 40 milliGRAM(s) Oral before breakfast    4. Mouthcare - NS / NaHCO3 rinses, Mycelex, Biotene; Skin care     5. GVHD prophylaxis   mycophenolate mofetil 1000 milliGRAM(s) Oral <User Schedule>  tacrolimus 1.5 milliGRAM(s) Oral two times a day  CTX days + 3, + 4     6. Transfuse & replete electrolytes prn   magnesium oxide 400 milliGRAM(s) Oral three times a day with meals    7. IV hydration, daily weights, strict I&O, prn diuresis     8. PO intake as tolerated, nutrition follow up as needed, MVI, folic acid     9. Antiemetics, anti-diarrhea medications:   metoclopramide Injectable 10 milliGRAM(s) IV Push every 6 hours PRN  ondansetron Injectable 8 milliGRAM(s) IV Push every 8 hours PRN    10. OOB as tolerated, physical therapy consult if needed     11. Monitor coags / fibrinogen 2x week, vitamin K as needed     12. Monitor closely for clinical changes, monitor for fevers     13. Emotional support provided, plan of care discussed and questions addressed     14. Patient education done regarding chemotherapy prep, plan of care, restrictions and discharge planning     15. Continue regular social work input     I have written the above note for Dr. Quiñones who performed service with me in the room.   Susan Grey NP-C ([phone])    I have seen and examined patient with NP, I agree with above note as scribed.

## 2019-10-25 LAB
ALBUMIN SERPL ELPH-MCNC: 3.2 G/DL — LOW (ref 3.3–5)
ALP SERPL-CCNC: 68 U/L — SIGNIFICANT CHANGE UP (ref 40–120)
ALT FLD-CCNC: 7 U/L — LOW (ref 10–45)
ANION GAP SERPL CALC-SCNC: 9 MMOL/L — SIGNIFICANT CHANGE UP (ref 5–17)
AST SERPL-CCNC: 9 U/L — LOW (ref 10–40)
BILIRUB DIRECT SERPL-MCNC: <0.1 MG/DL — SIGNIFICANT CHANGE UP (ref 0–0.2)
BILIRUB INDIRECT FLD-MCNC: >0.2 MG/DL — SIGNIFICANT CHANGE UP (ref 0.2–1)
BILIRUB SERPL-MCNC: 0.3 MG/DL — SIGNIFICANT CHANGE UP (ref 0.2–1.2)
BLD GP AB SCN SERPL QL: NEGATIVE — SIGNIFICANT CHANGE UP
BUN SERPL-MCNC: 13 MG/DL — SIGNIFICANT CHANGE UP (ref 7–23)
CALCIUM SERPL-MCNC: 9.3 MG/DL — SIGNIFICANT CHANGE UP (ref 8.4–10.5)
CHLORIDE SERPL-SCNC: 106 MMOL/L — SIGNIFICANT CHANGE UP (ref 96–108)
CO2 SERPL-SCNC: 25 MMOL/L — SIGNIFICANT CHANGE UP (ref 22–31)
CREAT SERPL-MCNC: 0.86 MG/DL — SIGNIFICANT CHANGE UP (ref 0.5–1.3)
GLUCOSE SERPL-MCNC: 104 MG/DL — HIGH (ref 70–99)
HCT VFR BLD CALC: 21 % — CRITICAL LOW (ref 39–50)
HGB BLD-MCNC: 7.2 G/DL — LOW (ref 13–17)
LDH SERPL L TO P-CCNC: 100 U/L — SIGNIFICANT CHANGE UP (ref 50–242)
MAGNESIUM SERPL-MCNC: 1.6 MG/DL — SIGNIFICANT CHANGE UP (ref 1.6–2.6)
MCHC RBC-ENTMCNC: 30 PG — SIGNIFICANT CHANGE UP (ref 27–34)
MCHC RBC-ENTMCNC: 34.3 GM/DL — SIGNIFICANT CHANGE UP (ref 32–36)
MCV RBC AUTO: 87.5 FL — SIGNIFICANT CHANGE UP (ref 80–100)
NRBC # BLD: 0 /100 WBCS — SIGNIFICANT CHANGE UP (ref 0–0)
PHOSPHATE SERPL-MCNC: 4 MG/DL — SIGNIFICANT CHANGE UP (ref 2.5–4.5)
PLATELET # BLD AUTO: 6 K/UL — CRITICAL LOW (ref 150–400)
POTASSIUM SERPL-MCNC: 4.7 MMOL/L — SIGNIFICANT CHANGE UP (ref 3.5–5.3)
POTASSIUM SERPL-SCNC: 4.7 MMOL/L — SIGNIFICANT CHANGE UP (ref 3.5–5.3)
PROT SERPL-MCNC: 5.4 G/DL — LOW (ref 6–8.3)
RBC # BLD: 2.4 M/UL — LOW (ref 4.2–5.8)
RBC # FLD: 17 % — HIGH (ref 10.3–14.5)
RH IG SCN BLD-IMP: POSITIVE — SIGNIFICANT CHANGE UP
SODIUM SERPL-SCNC: 140 MMOL/L — SIGNIFICANT CHANGE UP (ref 135–145)
TACROLIMUS SERPL-MCNC: 8.3 NG/ML — SIGNIFICANT CHANGE UP
WBC # BLD: 0.32 K/UL — CRITICAL LOW (ref 3.8–10.5)
WBC # FLD AUTO: 0.32 K/UL — CRITICAL LOW (ref 3.8–10.5)

## 2019-10-25 PROCEDURE — 99232 SBSQ HOSP IP/OBS MODERATE 35: CPT

## 2019-10-25 PROCEDURE — 99291 CRITICAL CARE FIRST HOUR: CPT

## 2019-10-25 RX ADMIN — MAGNESIUM OXIDE 400 MG ORAL TABLET 400 MILLIGRAM(S): 241.3 TABLET ORAL at 12:32

## 2019-10-25 RX ADMIN — URSODIOL 300 MILLIGRAM(S): 250 TABLET, FILM COATED ORAL at 17:48

## 2019-10-25 RX ADMIN — HEPARIN SODIUM 3.39 UNIT(S)/HR: 5000 INJECTION INTRAVENOUS; SUBCUTANEOUS at 21:23

## 2019-10-25 RX ADMIN — CEFEPIME 100 MILLIGRAM(S): 1 INJECTION, POWDER, FOR SOLUTION INTRAMUSCULAR; INTRAVENOUS at 21:24

## 2019-10-25 RX ADMIN — TACROLIMUS 1.5 MILLIGRAM(S): 5 CAPSULE ORAL at 17:48

## 2019-10-25 RX ADMIN — Medication 5 MILLILITER(S): at 23:00

## 2019-10-25 RX ADMIN — FINASTERIDE 5 MILLIGRAM(S): 5 TABLET, FILM COATED ORAL at 14:14

## 2019-10-25 RX ADMIN — SODIUM CHLORIDE 20 MILLILITER(S): 9 INJECTION INTRAMUSCULAR; INTRAVENOUS; SUBCUTANEOUS at 03:56

## 2019-10-25 RX ADMIN — Medication 1 LOZENGE: at 19:56

## 2019-10-25 RX ADMIN — Medication 400 MILLIGRAM(S): at 13:54

## 2019-10-25 RX ADMIN — Medication 5 MILLILITER(S): at 15:02

## 2019-10-25 RX ADMIN — Medication 1 LOZENGE: at 08:06

## 2019-10-25 RX ADMIN — VORICONAZOLE 200 MILLIGRAM(S): 10 INJECTION, POWDER, LYOPHILIZED, FOR SOLUTION INTRAVENOUS at 17:48

## 2019-10-25 RX ADMIN — MYCOPHENOLATE MOFETIL 1000 MILLIGRAM(S): 250 CAPSULE ORAL at 08:06

## 2019-10-25 RX ADMIN — Medication 1 MILLIGRAM(S): at 21:25

## 2019-10-25 RX ADMIN — Medication 400 MILLIGRAM(S): at 21:27

## 2019-10-25 RX ADMIN — Medication 12.5 MILLIGRAM(S): at 05:04

## 2019-10-25 RX ADMIN — Medication 10 MILLILITER(S): at 08:06

## 2019-10-25 RX ADMIN — Medication 50 MILLIGRAM(S): at 08:51

## 2019-10-25 RX ADMIN — Medication 5 MILLILITER(S): at 08:06

## 2019-10-25 RX ADMIN — Medication 1 LOZENGE: at 23:00

## 2019-10-25 RX ADMIN — Medication 1 TABLET(S): at 12:25

## 2019-10-25 RX ADMIN — SERTRALINE 50 MILLIGRAM(S): 25 TABLET, FILM COATED ORAL at 21:27

## 2019-10-25 RX ADMIN — MYCOPHENOLATE MOFETIL 1000 MILLIGRAM(S): 250 CAPSULE ORAL at 13:54

## 2019-10-25 RX ADMIN — Medication 10 MILLILITER(S): at 12:25

## 2019-10-25 RX ADMIN — SODIUM CHLORIDE 20 MILLILITER(S): 9 INJECTION INTRAMUSCULAR; INTRAVENOUS; SUBCUTANEOUS at 21:23

## 2019-10-25 RX ADMIN — VORICONAZOLE 200 MILLIGRAM(S): 10 INJECTION, POWDER, LYOPHILIZED, FOR SOLUTION INTRAVENOUS at 05:04

## 2019-10-25 RX ADMIN — Medication 10 MILLILITER(S): at 19:56

## 2019-10-25 RX ADMIN — Medication 1 LOZENGE: at 12:25

## 2019-10-25 RX ADMIN — CEFEPIME 100 MILLIGRAM(S): 1 INJECTION, POWDER, FOR SOLUTION INTRAMUSCULAR; INTRAVENOUS at 05:02

## 2019-10-25 RX ADMIN — HEPARIN SODIUM 3.39 UNIT(S)/HR: 5000 INJECTION INTRAVENOUS; SUBCUTANEOUS at 03:58

## 2019-10-25 RX ADMIN — TACROLIMUS 1.5 MILLIGRAM(S): 5 CAPSULE ORAL at 05:54

## 2019-10-25 RX ADMIN — Medication 10 MILLILITER(S): at 23:00

## 2019-10-25 RX ADMIN — Medication 10 MILLILITER(S): at 15:02

## 2019-10-25 RX ADMIN — Medication 25 MILLIGRAM(S): at 08:51

## 2019-10-25 RX ADMIN — URSODIOL 300 MILLIGRAM(S): 250 TABLET, FILM COATED ORAL at 08:06

## 2019-10-25 RX ADMIN — Medication 5 MILLILITER(S): at 12:25

## 2019-10-25 RX ADMIN — CEFEPIME 100 MILLIGRAM(S): 1 INJECTION, POWDER, FOR SOLUTION INTRAMUSCULAR; INTRAVENOUS at 14:35

## 2019-10-25 RX ADMIN — Medication 480 MICROGRAM(S): at 17:47

## 2019-10-25 RX ADMIN — MAGNESIUM OXIDE 400 MG ORAL TABLET 400 MILLIGRAM(S): 241.3 TABLET ORAL at 08:05

## 2019-10-25 RX ADMIN — Medication 650 MILLIGRAM(S): at 08:50

## 2019-10-25 RX ADMIN — MAGNESIUM OXIDE 400 MG ORAL TABLET 400 MILLIGRAM(S): 241.3 TABLET ORAL at 17:49

## 2019-10-25 RX ADMIN — Medication 1 LOZENGE: at 15:02

## 2019-10-25 RX ADMIN — Medication 1 MILLIGRAM(S): at 12:25

## 2019-10-25 RX ADMIN — Medication 5 MILLILITER(S): at 19:56

## 2019-10-25 RX ADMIN — TAMSULOSIN HYDROCHLORIDE 0.4 MILLIGRAM(S): 0.4 CAPSULE ORAL at 21:27

## 2019-10-25 RX ADMIN — Medication 400 MILLIGRAM(S): at 05:04

## 2019-10-25 RX ADMIN — Medication 12.5 MILLIGRAM(S): at 17:48

## 2019-10-25 RX ADMIN — MYCOPHENOLATE MOFETIL 1000 MILLIGRAM(S): 250 CAPSULE ORAL at 21:27

## 2019-10-25 RX ADMIN — Medication 2.5 MILLIGRAM(S): at 05:04

## 2019-10-25 RX ADMIN — PANTOPRAZOLE SODIUM 40 MILLIGRAM(S): 20 TABLET, DELAYED RELEASE ORAL at 06:21

## 2019-10-25 NOTE — PROGRESS NOTE ADULT - SUBJECTIVE AND OBJECTIVE BOX
[facility] - Ede Zamudio, Riki, Edgar, Gem, Tabitha Garcia  Office Number:  [phone]    Patient resting comfortably in bed in NAD.  Laying flat with no respiratory distress.  No complaints of chest pain, dyspnea, palpitations, PND, or orthopnea.  says he is feeling well.    ROS: negative unless otherwise mentioned.    Telemetry:  off    MEDICATIONS  (STANDING):  acetaminophen   Tablet .. 650 milliGRAM(s) Oral every 6 hours  acetaminophen   Tablet .. 650 milliGRAM(s) Oral <User Schedule>  acyclovir   Oral Tab/Cap 400 milliGRAM(s) Oral every 8 hours  ALPRAZolam 1 milliGRAM(s) Oral at bedtime  Biotene Dry Mouth Oral Rinse 5 milliLiter(s) Swish and Spit five times a day  cefepime   IVPB 2000 milliGRAM(s) IV Intermittent every 8 hours  chlorhexidine 4% Liquid 1 Application(s) Topical <User Schedule>  clotrimazole Lozenge 1 Lozenge Oral five times a day  diphenhydrAMINE   Injectable 25 milliGRAM(s) IV Push <User Schedule>  docusate sodium 100 milliGRAM(s) Oral three times a day  enalapril 2.5 milliGRAM(s) Oral daily  filgrastim-sndz Injectable 480 MICROGram(s) SubCutaneous daily  finasteride 5 milliGRAM(s) Oral daily  folic acid 1 milliGRAM(s) Oral daily  heparin  Infusion 339 Unit(s)/Hr (3.39 mL/Hr) IV Continuous <Continuous>  immune   globulin 10% (GAMMAGARD) IVPB 30 Gram(s) IV Intermittent <User Schedule>  lidocaine/prilocaine Cream 1 Application(s) Topical daily  magnesium oxide 400 milliGRAM(s) Oral three times a day with meals  metoprolol succinate ER 12.5 milliGRAM(s) Oral two times a day  multivitamin 1 Tablet(s) Oral daily  mycophenolate mofetil 1000 milliGRAM(s) Oral <User Schedule>  pantoprazole    Tablet 40 milliGRAM(s) Oral before breakfast  senna 2 Tablet(s) Oral at bedtime  sertraline 50 milliGRAM(s) Oral at bedtime  sodium bicarbonate Mouth Rinse 10 milliLiter(s) Swish and Spit five times a day  sodium chloride 0.9%. 1000 milliLiter(s) (20 mL/Hr) IV Continuous <Continuous>  tacrolimus 1.5 milliGRAM(s) Oral two times a day  tamsulosin 0.4 milliGRAM(s) Oral at bedtime  ursodiol Capsule 300 milliGRAM(s) Oral two times a day with meals  voriconazole 200 milliGRAM(s) Oral every 12 hours    MEDICATIONS  (PRN):  acetaminophen   Tablet .. 650 milliGRAM(s) Oral every 6 hours PRN Temp greater or equal to 38C (100.4F), Mild Pain (1 - 3)  acetaminophen  IVPB .. 1000 milliGRAM(s) IV Intermittent once PRN Temp greater or equal to 38.5C (101.3F)  diphenhydrAMINE   Injectable 25 milliGRAM(s) IV Push every 4 hours PRN Allergy symptoms  hydrocortisone sodium succinate Injectable 50 milliGRAM(s) IV Push once PRN If hives  metoclopramide Injectable 10 milliGRAM(s) IV Push every 6 hours PRN Nausea and/or Vomiting  ondansetron Injectable 8 milliGRAM(s) IV Push every 8 hours PRN Nausea and/or Vomiting  sodium chloride 0.9% lock flush 10 milliLiter(s) IV Push every 1 hour PRN Pre/post blood products, medications, blood draw, and to maintain line patency      Allergies    No Known Allergies    Intolerances        Vital Signs Last 24 Hrs  T(C): 36.4 (25 Oct 2019 06:14), Max: 36.6 (24 Oct 2019 21:46)  T(F): 97.5 (25 Oct 2019 06:14), Max: 97.8 (24 Oct 2019 21:46)  HR: 84 (25 Oct 2019 06:14) (76 - 88)  BP: 115/72 (25 Oct 2019 06:14) (115/72 - 144/86)  BP(mean): --  RR: 18 (25 Oct 2019 06:14) (18 - 18)  SpO2: 96% (25 Oct 2019 06:14) (96% - 98%)    I&O's Summary    24 Oct 2019 07:01  -  25 Oct 2019 07:00  --------------------------------------------------------  IN: 1573.4 mL / OUT: 1900 mL / NET: -326.6 mL        ON EXAM:    Constitutional: well-nourished, well-developed, NAD   HEENT:  MMM, sclerae anicteric, conjunctivae clear, no oral cyanosis.  Pulmonary: Non-labored, breath sounds are clear bilaterally, No wheezing, rales or rhonchi  Cardiovascular: Regular, S1 and S2.  No murmur.  No rubs, gallops or clicks  Gastrointestinal: Bowel Sounds present, soft, nontender.   Lymph: No peripheral edema.   Neurological: Alert, no focal deficits  Skin: No rashes.  Psych:  Mood & affect appropriate  LABS: All Labs Reviewed:                        7.2    0.32  )-----------( 6        ( 25 Oct 2019 07:10 )             21.0                         7.7    <0.10 )-----------( 12       ( 24 Oct 2019 06:53 )             23.5                         7.2    <0.10 )-----------( 17       ( 23 Oct 2019 07:13 )             21.0     25 Oct 2019 07:10    140    |  106    |  13     ----------------------------<  104    4.7     |  25     |  0.86   24 Oct 2019 06:53    139    |  104    |  15     ----------------------------<  98     4.4     |  24     |  0.92   23 Oct 2019 07:13    142    |  107    |  19     ----------------------------<  99     4.1     |  24     |  0.90     Ca    9.3        25 Oct 2019 07:10  Ca    9.2        24 Oct 2019 06:53  Ca    8.9        23 Oct 2019 07:13  Phos  4.0       25 Oct 2019 07:10  Phos  3.8       24 Oct 2019 06:53  Phos  3.7       23 Oct 2019 07:13  Mg     1.6       25 Oct 2019 07:10  Mg     1.7       24 Oct 2019 06:53  Mg     1.5       23 Oct 2019 07:13    TPro  5.4    /  Alb  3.2    /  TBili  0.3    /  DBili  <0.1   /  AST  9      /  ALT  7      /  AlkPhos  68     25 Oct 2019 07:10  TPro  5.8    /  Alb  3.5    /  TBili  0.4    /  DBili  x      /  AST  9      /  ALT  8      /  AlkPhos  70     24 Oct 2019 06:53  TPro  5.4    /  Alb  3.2    /  TBili  0.4    /  DBili  <0.1   /  AST  10     /  ALT  7      /  AlkPhos  63     23 Oct 2019 07:13    PT/INR - ( 24 Oct 2019 06:53 )   PT: 13.5 sec;   INR: 1.18 ratio         PTT - ( 24 Oct 2019 06:53 )  PTT:30.0 sec      Blood Culture:

## 2019-10-25 NOTE — PROGRESS NOTE ADULT - ATTENDING COMMENTS
59 year old male, with previously diagnosed acute myeloid leukemia. s/p induction chemotherapy with Daunorubicin/Cytarabine and HIDAC consolidation chemotherapy, now admitted for Haplo-identical stem cell transplant from son. Conditioning regimen of Fludarabine, Cytoxan, and TBI. Other history includes non-ischemic cardiomyopathy (recent EF 25% 9/19/19), COLLEEN, DVT/PE, HTN, Factor V Leiden and amputation of right toe due to osteomyelitis.  Day + 15, s/p haploPBSCT, await engraftment  Started  Zarxio on day +5,  MMF and Tacrolimus. Check Tacrolimus levels on Monday and Thursday.   Prior fevers likely due to haplostorm,  s/p CTX 2/2.  Follow temps if persist can dose 1 mg/kg of solumedrol.  Blood/urine cultures from 10/10 and 10/12 negative.  Continue cefepime/voriconazole for now along with acyclovir prophylaxis.   Strict I&O, daily weights, prn diuresis   Renal insufficiency- monitor daily creatinine, normal today.   Anxiolytics, antinausea, antidiarrhea medications as needed.  Nutritional support.  Hx non-ischemic CM, followed by Dr. Luke Lyn, last Echo 9/19 (EF 25%).  Monitor for fluid overload.  VOD prophylaxis - low dose heparin gtt (dosed at 100 units / kg / day), glutamine supplementation, Actigall BID   GI prophylaxis - Protonix po QD.  Electrolyte support  Aggressive mouth care and skin care as per protocol.  OOB/ambulate 59 year old male, with previously diagnosed acute myeloid leukemia. s/p induction chemotherapy with Daunorubicin/Cytarabine and HIDAC consolidation chemotherapy, now admitted for Haplo-identical stem cell transplant from son. Conditioning regimen of Fludarabine, Cytoxan, and TBI. Other history includes non-ischemic cardiomyopathy (recent EF 25% 9/19/19), COLLEEN, DVT/PE, HTN, Factor V Leiden and amputation of right toe due to osteomyelitis.  Day + 16, s/p haploPBSCT, early engraftment  Started  Zarxio on day +5,  MMF and Tacrolimus. Check Tacrolimus levels on Monday and Thursday.   Prior fevers likely due to haplostorm,  s/p CTX 2/2.  Follow temps if persist can dose 1 mg/kg of solumedrol.  Blood/urine cultures from 10/10 and 10/12 negative.  Continue cefepime/voriconazole for now along with acyclovir prophylaxis.   Strict I&O, daily weights, prn diuresis   Renal insufficiency- monitor daily creatinine, normal today.   Anxiolytics, antinausea, antidiarrhea medications as needed.  Nutritional support.  Hx non-ischemic CM, followed by Dr. Luke Lyn, last Echo 9/19 (EF 25%).  Monitor for fluid overload.  VOD prophylaxis - low dose heparin gtt (dosed at 100 units / kg / day), glutamine supplementation, Actigall BID   GI prophylaxis - Protonix po QD.  Electrolyte support  Aggressive mouth care and skin care as per protocol.  OOB/ambulate

## 2019-10-25 NOTE — PROGRESS NOTE ADULT - SUBJECTIVE AND OBJECTIVE BOX
HPC Transplant Team                                                      Critical / Counseling Time Provided: 30 minutes                                                                                                                                                        Chief Complaint:     S: Patient seen and examined with HPC Transplant Team:   Denies mouth / tongue / throat pain, dyspnea, cough, nausea, vomiting, diarrhea, abdominal pain     O: Vitals:   Vital Signs Last 24 Hrs  T(C): 36.4 (25 Oct 2019 06:14), Max: 36.6 (24 Oct 2019 21:46)  T(F): 97.5 (25 Oct 2019 06:14), Max: 97.8 (24 Oct 2019 21:46)  HR: 84 (25 Oct 2019 06:14) (76 - 88)  BP: 115/72 (25 Oct 2019 06:14) (115/72 - 144/86)  BP(mean): --  RR: 18 (25 Oct 2019 06:14) (18 - 18)  SpO2: 96% (25 Oct 2019 06:14) (96% - 98%)    Admit weight:   Daily     Daily Weight in k.9 (24 Oct 2019 09:06)    Intake / Output:   10-24 @ 07:01  -  10-25 @ 07:00  --------------------------------------------------------  IN: 1573.4 mL / OUT: 1900 mL / NET: -326.6 mL          PE:   Oropharynx:   Oral Mucositis:                                                        Grade:   CVS:   Lungs:   Abdomen:  Extremities:   Gastric Mucositis:                                                  Grade:   Intestinal Mucositis:                                              Grade:   Skin:   TLC:   Neuro:   Pain:     Labs:   CBC Full  -  ( 24 Oct 2019 06:53 )  WBC Count : <0.10 K/uL  Hemoglobin : 7.7 g/dL  Hematocrit : 23.5 %  Platelet Count - Automated : 12 K/uL  Mean Cell Volume : 88.0 fl  Mean Cell Hemoglobin : 28.8 pg  Mean Cell Hemoglobin Concentration : 32.8 gm/dL  Auto Neutrophil # : x  Auto Lymphocyte # : x  Auto Monocyte # : x  Auto Eosinophil # : x  Auto Basophil # : x  Auto Neutrophil % : x  Auto Lymphocyte % : x  Auto Monocyte % : x  Auto Eosinophil % : x  Auto Basophil % : x                          7.7    <0.10 )-----------( 12       ( 24 Oct 2019 06:53 )             23.5     10-24    139  |  104  |  15  ----------------------------<  98  4.4   |  24  |  0.92    Ca    9.2      24 Oct 2019 06:53  Phos  3.8     10-24  Mg     1.7     10-24    TPro  5.8<L>  /  Alb  3.5  /  TBili  0.4  /  DBili  x   /  AST  9<L>  /  ALT  8<L>  /  AlkPhos  70  10-24    PT/INR - ( 24 Oct 2019 06:53 )   PT: 13.5 sec;   INR: 1.18 ratio         PTT - ( 24 Oct 2019 06:53 )  PTT:30.0 sec  LIVER FUNCTIONS - ( 24 Oct 2019 06:53 )  Alb: 3.5 g/dL / Pro: 5.8 g/dL / ALK PHOS: 70 U/L / ALT: 8 U/L / AST: 9 U/L / GGT: x                10-24 @ 09:08  Tacrolimus 10.4                Cyclosporine --      Karnofsky / Lansky Scale:   GVHD:   Skin:   Liver:   Gut:   Overall Grade:       Cultures:         Radiology:       Meds:   Antimicrobials:   acyclovir   Oral Tab/Cap 400 milliGRAM(s) Oral every 8 hours  cefepime   IVPB 2000 milliGRAM(s) IV Intermittent every 8 hours  clotrimazole Lozenge 1 Lozenge Oral five times a day  voriconazole 200 milliGRAM(s) Oral every 12 hours      Heme / Onc:   heparin  Infusion 339 Unit(s)/Hr IV Continuous <Continuous>      GI:  docusate sodium 100 milliGRAM(s) Oral three times a day  pantoprazole    Tablet 40 milliGRAM(s) Oral before breakfast  senna 2 Tablet(s) Oral at bedtime  sodium bicarbonate Mouth Rinse 10 milliLiter(s) Swish and Spit five times a day  ursodiol Capsule 300 milliGRAM(s) Oral two times a day with meals      Cardiovascular:   enalapril 2.5 milliGRAM(s) Oral daily  metoprolol succinate ER 12.5 milliGRAM(s) Oral two times a day  tamsulosin 0.4 milliGRAM(s) Oral at bedtime      Immunologic:   filgrastim-sndz Injectable 480 MICROGram(s) SubCutaneous daily  immune   globulin 10% (GAMMAGARD) IVPB 30 Gram(s) IV Intermittent <User Schedule>  mycophenolate mofetil 1000 milliGRAM(s) Oral <User Schedule>  tacrolimus 1.5 milliGRAM(s) Oral two times a day      Other medications:   acetaminophen   Tablet .. 650 milliGRAM(s) Oral every 6 hours  acetaminophen   Tablet .. 650 milliGRAM(s) Oral <User Schedule>  ALPRAZolam 1 milliGRAM(s) Oral at bedtime  Biotene Dry Mouth Oral Rinse 5 milliLiter(s) Swish and Spit five times a day  chlorhexidine 4% Liquid 1 Application(s) Topical <User Schedule>  diphenhydrAMINE   Injectable 25 milliGRAM(s) IV Push <User Schedule>  finasteride 5 milliGRAM(s) Oral daily  folic acid 1 milliGRAM(s) Oral daily  lidocaine/prilocaine Cream 1 Application(s) Topical daily  magnesium oxide 400 milliGRAM(s) Oral three times a day with meals  multivitamin 1 Tablet(s) Oral daily  sertraline 50 milliGRAM(s) Oral at bedtime  sodium chloride 0.9%. 1000 milliLiter(s) IV Continuous <Continuous>      PRN:   acetaminophen   Tablet .. 650 milliGRAM(s) Oral every 6 hours PRN  acetaminophen  IVPB .. 1000 milliGRAM(s) IV Intermittent once PRN  diphenhydrAMINE   Injectable 25 milliGRAM(s) IV Push every 4 hours PRN  hydrocortisone sodium succinate Injectable 50 milliGRAM(s) IV Push once PRN  metoclopramide Injectable 10 milliGRAM(s) IV Push every 6 hours PRN  ondansetron Injectable 8 milliGRAM(s) IV Push every 8 hours PRN  sodium chloride 0.9% lock flush 10 milliLiter(s) IV Push every 1 hour PRN      A/P:   ___ year old ___  with a history of ______________________  Pre / Status Post :  Autologous / Allogeneic PBSCT / BMT day ____________    1. Infectious Disease:   Fluconazole, Acyclovir     2. VOD Prophylaxis: Actigall, Glutamine, Heparin (dosed at 100 units / kg / day)     3. GI Prophylaxis:  Protonix    4. Mouthcare - NS / NaHCO3 rinses, Mycelex, Caphosol, skin care     5. GVHD prophylaxis     6. Transfuse & replete electrolytes prn     7. IV hydration, daily weights, strict I&O, prn diuresis     8. PO intake as tolerated, nutrition follow up as needed, MVI, folic acid     9. Antiemetics, anti-diarrhea medications:   Reglan, Ativan    10. OOB as tolerated, physical therapy consult if needed     11. Monitor coags / fibrinogen 2x week, vitamin K as needed     12. Monitor closely for clinical changes, monitor for fevers     13. Emotional support provided, plan of care discussed with patient and family, questions addressed     14. Patient education done regarding chemotherapy prep, plan of care, restrictions and discharge planning     15. Continue regular social work input     I have written the above note for Dr. Quiñones who performed service with me in the room.   Brandon Hansen  NP-C ([phone])    I have seen and examined patient with NP, I agree with above note as scribed. 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     S: Patient seen and examined with HPC Transplant Team:   + gingival irritation - removed dentures   + rash   Denies tongue / throat pain, dyspnea, cough, nausea, vomiting, diarrhea, abdominal pain                                                                                                                                                    O: Vitals:   Vital Signs Last 24 Hrs  T(C): 36.4 (25 Oct 2019 06:14), Max: 36.6 (24 Oct 2019 21:46)  T(F): 97.5 (25 Oct 2019 06:14), Max: 97.8 (24 Oct 2019 21:46)  HR: 84 (25 Oct 2019 06:14) (76 - 88)  BP: 115/72 (25 Oct 2019 06:14) (115/72 - 144/86)  BP(mean): --  RR: 18 (25 Oct 2019 06:14) (18 - 18)  SpO2: 96% (25 Oct 2019 06:14) (96% - 98%)    Admit weight:   Daily     Daily Weight in k.9 (24 Oct 2019 09:06)    Intake / Output:   10-24 @ 07:01  -  10-25 @ 07:00  --------------------------------------------------------  IN: 1573.4 mL / OUT: 1900 mL / NET: -326.6 mL    PE:    Oropharynx: no ulceration or erythema  Oral Mucositis:   -                                             Grade: -  CVS: RRR, normal S1 S2  Lungs: CTA bilaterally   Abdomen: soft, + normoactive BS, NT, ND  Extremities: warm, no edema. Toe amputation  Gastric Mucositis:     -                                             Grade: -  Intestinal Mucositis:      -                                        Grade: -  Skin: patchy erythematous rash back and upper chest  TLC:  C/D/I  Neuro: Alert and oriented x 3  Pain: none     Labs:   CBC Full  -  ( 24 Oct 2019 06:53 )  WBC Count : <0.10 K/uL  Hemoglobin : 7.7 g/dL  Hematocrit : 23.5 %  Platelet Count - Automated : 12 K/uL  Mean Cell Volume : 88.0 fl  Mean Cell Hemoglobin : 28.8 pg  Mean Cell Hemoglobin Concentration : 32.8 gm/dL  Auto Neutrophil # : x  Auto Lymphocyte # : x  Auto Monocyte # : x  Auto Eosinophil # : x  Auto Basophil # : x  Auto Neutrophil % : x  Auto Lymphocyte % : x  Auto Monocyte % : x  Auto Eosinophil % : x  Auto Basophil % : x                          7.7    <0.10 )-----------( 12       ( 24 Oct 2019 06:53 )             23.5     10-24    139  |  104  |  15  ----------------------------<  98  4.4   |  24  |  0.92    Ca    9.2      24 Oct 2019 06:53  Phos  3.8     10-24  Mg     1.7     10-24    TPro  5.8<L>  /  Alb  3.5  /  TBili  0.4  /  DBili  x   /  AST  9<L>  /  ALT  8<L>  /  AlkPhos  70  10-24    PT/INR - ( 24 Oct 2019 06:53 )   PT: 13.5 sec;   INR: 1.18 ratio         PTT - ( 24 Oct 2019 06:53 )  PTT:30.0 sec  LIVER FUNCTIONS - ( 24 Oct 2019 06:53 )  Alb: 3.5 g/dL / Pro: 5.8 g/dL / ALK PHOS: 70 U/L / ALT: 8 U/L / AST: 9 U/L / GGT: x                10 @ 09:08  Tacrolimus 10.4                Cyclosporine --      Karnofsky / Lansky Scale:   GVHD:   Skin:   Liver:   Gut:   Overall Grade:       Cultures:         Radiology:       Meds:   Antimicrobials:   acyclovir   Oral Tab/Cap 400 milliGRAM(s) Oral every 8 hours  cefepime   IVPB 2000 milliGRAM(s) IV Intermittent every 8 hours  clotrimazole Lozenge 1 Lozenge Oral five times a day  voriconazole 200 milliGRAM(s) Oral every 12 hours      Heme / Onc:   heparin  Infusion 339 Unit(s)/Hr IV Continuous <Continuous>      GI:  docusate sodium 100 milliGRAM(s) Oral three times a day  pantoprazole    Tablet 40 milliGRAM(s) Oral before breakfast  senna 2 Tablet(s) Oral at bedtime  sodium bicarbonate Mouth Rinse 10 milliLiter(s) Swish and Spit five times a day  ursodiol Capsule 300 milliGRAM(s) Oral two times a day with meals      Cardiovascular:   enalapril 2.5 milliGRAM(s) Oral daily  metoprolol succinate ER 12.5 milliGRAM(s) Oral two times a day  tamsulosin 0.4 milliGRAM(s) Oral at bedtime      Immunologic:   filgrastim-sndz Injectable 480 MICROGram(s) SubCutaneous daily  immune   globulin 10% (GAMMAGARD) IVPB 30 Gram(s) IV Intermittent <User Schedule>  mycophenolate mofetil 1000 milliGRAM(s) Oral <User Schedule>  tacrolimus 1.5 milliGRAM(s) Oral two times a day      Other medications:   acetaminophen   Tablet .. 650 milliGRAM(s) Oral every 6 hours  acetaminophen   Tablet .. 650 milliGRAM(s) Oral <User Schedule>  ALPRAZolam 1 milliGRAM(s) Oral at bedtime  Biotene Dry Mouth Oral Rinse 5 milliLiter(s) Swish and Spit five times a day  chlorhexidine 4% Liquid 1 Application(s) Topical <User Schedule>  diphenhydrAMINE   Injectable 25 milliGRAM(s) IV Push <User Schedule>  finasteride 5 milliGRAM(s) Oral daily  folic acid 1 milliGRAM(s) Oral daily  lidocaine/prilocaine Cream 1 Application(s) Topical daily  magnesium oxide 400 milliGRAM(s) Oral three times a day with meals  multivitamin 1 Tablet(s) Oral daily  sertraline 50 milliGRAM(s) Oral at bedtime  sodium chloride 0.9%. 1000 milliLiter(s) IV Continuous <Continuous>      PRN:   acetaminophen   Tablet .. 650 milliGRAM(s) Oral every 6 hours PRN  acetaminophen  IVPB .. 1000 milliGRAM(s) IV Intermittent once PRN  diphenhydrAMINE   Injectable 25 milliGRAM(s) IV Push every 4 hours PRN  hydrocortisone sodium succinate Injectable 50 milliGRAM(s) IV Push once PRN  metoclopramide Injectable 10 milliGRAM(s) IV Push every 6 hours PRN  ondansetron Injectable 8 milliGRAM(s) IV Push every 8 hours PRN  sodium chloride 0.9% lock flush 10 milliLiter(s) IV Push every 1 hour PRN      A/P:  59 year old male with a history of AML  Post Allogeneic PBSCT day +16  Strict I&O, daily weights, prn diuresis   Cardiology following   10/14- Started Tacro, Cellcept and Zarxio  Afebrile, neutropenic - continue Cefepime.  10/11- Patient will need split units of  PRBC if need to be transfused due to low EF.  10/23-  Platelets transfusion reaction yesterday, transfusion workup in process.    1. Infectious Disease:   acyclovir   Oral Tab/Cap 400 milliGRAM(s) Oral every 8 hours  cefepime   IVPB 2000 milliGRAM(s) IV Intermittent every 8 hours  clotrimazole Lozenge 1 Lozenge Oral five times a day  voriconazole 200 milliGRAM(s) Oral every 12 hours    2. VOD Prophylaxis: Actigall, Glutamine, Heparin (dosed at 100 units / kg / day)     3. GI Prophylaxis:    pantoprazole    Tablet 40 milliGRAM(s) Oral before breakfast    4. Mouthcare - NS / NaHCO3 rinses, Mycelex, Biotene; Skin care     5. GVHD prophylaxis   mycophenolate mofetil 1000 milliGRAM(s) Oral <User Schedule>  tacrolimus 1.5 milliGRAM(s) Oral two times a day  CTX days + 3, + 4     6. Transfuse & replete electrolytes prn   magnesium oxide 400 milliGRAM(s) Oral three times a day with meals    7. IV hydration, daily weights, strict I&O, prn diuresis     8. PO intake as tolerated, nutrition follow up as needed, MVI, folic acid     9. Antiemetics, anti-diarrhea medications:   metoclopramide Injectable 10 milliGRAM(s) IV Push every 6 hours PRN  ondansetron Injectable 8 milliGRAM(s) IV Push every 8 hours PRN    10. OOB as tolerated, physical therapy consult if needed     11. Monitor coags / fibrinogen 2x week, vitamin K as needed     12. Monitor closely for clinical changes, monitor for fevers     13. Emotional support provided, plan of care discussed and questions addressed     14. Patient education done regarding chemotherapy prep, plan of care, restrictions and discharge planning     15. Continue regular social work input        I have written the above note for Dr. Quiñones who performed service with me in the room.   Brandon Hansen NP-C ([phone])    I have seen and examined patient with NP, I agree with above note as scribed. 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    + rash resolving    Denies tongue, throat pain, dyspnea, cough, nausea, vomiting, diarrhea, abdominal pain                                                                                                                                                    O: Vitals:   Vital Signs Last 24 Hrs  T(C): 36.4 (25 Oct 2019 06:14), Max: 36.6 (24 Oct 2019 21:46)  T(F): 97.5 (25 Oct 2019 06:14), Max: 97.8 (24 Oct 2019 21:46)  HR: 84 (25 Oct 2019 06:14) (76 - 88)  BP: 115/72 (25 Oct 2019 06:14) (115/72 - 144/86)  BP(mean): --  RR: 18 (25 Oct 2019 06:14) (18 - 18)  SpO2: 96% (25 Oct 2019 06:14) (96% - 98%)    Admit weight:   Daily     Daily Weight in k.9 (24 Oct 2019 09:06)    Intake / Output:   10-24 @ 07:01  -  10-25 @ 07:00  --------------------------------------------------------  IN: 1573.4 mL / OUT: 1900 mL / NET: -326.6 mL    PE:    Oropharynx: no ulceration or erythema  Oral Mucositis:   -                                             Grade: -  CVS: RRR, normal S1 S2  Lungs: CTA bilaterally   Abdomen: soft, + normoactive BS, NT, ND  Extremities: warm, no edema in BLE . Toe amputation  Gastric Mucositis:     -                                             Grade: -  Intestinal Mucositis:      -                                        Grade: -  Skin: patchy erythematous rash back and upper chest resolving  TLC:  C/D/I  Neuro: Alert and oriented x 3  Pain: none     Labs:   CBC Full  -  ( 24 Oct 2019 06:53 )  WBC Count : <0.10 K/uL  Hemoglobin : 7.7 g/dL  Hematocrit : 23.5 %  Platelet Count - Automated : 12 K/uL  Mean Cell Volume : 88.0 fl  Mean Cell Hemoglobin : 28.8 pg  Mean Cell Hemoglobin Concentration : 32.8 gm/dL  Auto Neutrophil # : x  Auto Lymphocyte # : x  Auto Monocyte # : x  Auto Eosinophil # : x  Auto Basophil # : x  Auto Neutrophil % : x  Auto Lymphocyte % : x  Auto Monocyte % : x  Auto Eosinophil % : x  Auto Basophil % : x                          7.7    <0.10 )-----------( 12       ( 24 Oct 2019 06:53 )             23.5     10-24    139  |  104  |  15  ----------------------------<  98  4.4   |  24  |  0.92    Ca    9.2      24 Oct 2019 06:53  Phos  3.8     10-24  Mg     1.7     10-24    TPro  5.8<L>  /  Alb  3.5  /  TBili  0.4  /  DBili  x   /  AST  9<L>  /  ALT  8<L>  /  AlkPhos  70  10-24    PT/INR - ( 24 Oct 2019 06:53 )   PT: 13.5 sec;   INR: 1.18 ratio         PTT - ( 24 Oct 2019 06:53 )  PTT:30.0 sec  LIVER FUNCTIONS - ( 24 Oct 2019 06:53 )  Alb: 3.5 g/dL / Pro: 5.8 g/dL / ALK PHOS: 70 U/L / ALT: 8 U/L / AST: 9 U/L / GGT: x                10- @ 09:08  Tacrolimus 10.4                Cyclosporine --      Karnofsky / Lansky Scale:   GVHD:   Skin:   Liver:   Gut:   Overall Grade:       Cultures:         Radiology:       Meds:   Antimicrobials:   acyclovir   Oral Tab/Cap 400 milliGRAM(s) Oral every 8 hours  cefepime   IVPB 2000 milliGRAM(s) IV Intermittent every 8 hours  clotrimazole Lozenge 1 Lozenge Oral five times a day  voriconazole 200 milliGRAM(s) Oral every 12 hours      Heme / Onc:   heparin  Infusion 339 Unit(s)/Hr IV Continuous <Continuous>      GI:  docusate sodium 100 milliGRAM(s) Oral three times a day  pantoprazole    Tablet 40 milliGRAM(s) Oral before breakfast  senna 2 Tablet(s) Oral at bedtime  sodium bicarbonate Mouth Rinse 10 milliLiter(s) Swish and Spit five times a day  ursodiol Capsule 300 milliGRAM(s) Oral two times a day with meals      Cardiovascular:   enalapril 2.5 milliGRAM(s) Oral daily  metoprolol succinate ER 12.5 milliGRAM(s) Oral two times a day  tamsulosin 0.4 milliGRAM(s) Oral at bedtime      Immunologic:   filgrastim-sndz Injectable 480 MICROGram(s) SubCutaneous daily  immune   globulin 10% (GAMMAGARD) IVPB 30 Gram(s) IV Intermittent <User Schedule>  mycophenolate mofetil 1000 milliGRAM(s) Oral <User Schedule>  tacrolimus 1.5 milliGRAM(s) Oral two times a day      Other medications:   acetaminophen   Tablet .. 650 milliGRAM(s) Oral every 6 hours  acetaminophen   Tablet .. 650 milliGRAM(s) Oral <User Schedule>  ALPRAZolam 1 milliGRAM(s) Oral at bedtime  Biotene Dry Mouth Oral Rinse 5 milliLiter(s) Swish and Spit five times a day  chlorhexidine 4% Liquid 1 Application(s) Topical <User Schedule>  diphenhydrAMINE   Injectable 25 milliGRAM(s) IV Push <User Schedule>  finasteride 5 milliGRAM(s) Oral daily  folic acid 1 milliGRAM(s) Oral daily  lidocaine/prilocaine Cream 1 Application(s) Topical daily  magnesium oxide 400 milliGRAM(s) Oral three times a day with meals  multivitamin 1 Tablet(s) Oral daily  sertraline 50 milliGRAM(s) Oral at bedtime  sodium chloride 0.9%. 1000 milliLiter(s) IV Continuous <Continuous>      PRN:   acetaminophen   Tablet .. 650 milliGRAM(s) Oral every 6 hours PRN  acetaminophen  IVPB .. 1000 milliGRAM(s) IV Intermittent once PRN  diphenhydrAMINE   Injectable 25 milliGRAM(s) IV Push every 4 hours PRN  hydrocortisone sodium succinate Injectable 50 milliGRAM(s) IV Push once PRN  metoclopramide Injectable 10 milliGRAM(s) IV Push every 6 hours PRN  ondansetron Injectable 8 milliGRAM(s) IV Push every 8 hours PRN  sodium chloride 0.9% lock flush 10 milliLiter(s) IV Push every 1 hour PRN      A/P:  59 year old male with a history of AML  Post Allogeneic PBSCT day +16  Strict I&O, daily weights, prn diuresis   Cardiology following   10/14- Started Tacro, Cellcept and Zarxio  Afebrile, neutropenic - continue Cefepime.  10/11- Patient will need split units of  PRBC if need to be transfused due to low EF.  10/23-  Platelets transfusion reaction yesterday, transfusion workup in process.    1. Infectious Disease:   acyclovir   Oral Tab/Cap 400 milliGRAM(s) Oral every 8 hours  cefepime   IVPB 2000 milliGRAM(s) IV Intermittent every 8 hours  clotrimazole Lozenge 1 Lozenge Oral five times a day  voriconazole 200 milliGRAM(s) Oral every 12 hours    2. VOD Prophylaxis: Actigall, Glutamine, Heparin (dosed at 100 units / kg / day)     3. GI Prophylaxis:    pantoprazole    Tablet 40 milliGRAM(s) Oral before breakfast    4. Mouthcare - NS / NaHCO3 rinses, Mycelex, Biotene; Skin care     5. GVHD prophylaxis   mycophenolate mofetil 1000 milliGRAM(s) Oral <User Schedule>  tacrolimus 1.5 milliGRAM(s) Oral two times a day  CTX days + 3, + 4     6. Transfuse & replete electrolytes prn   magnesium oxide 400 milliGRAM(s) Oral three times a day with meals    7. IV hydration, daily weights, strict I&O, prn diuresis     8. PO intake as tolerated, nutrition follow up as needed, MVI, folic acid     9. Antiemetics, anti-diarrhea medications:   metoclopramide Injectable 10 milliGRAM(s) IV Push every 6 hours PRN  ondansetron Injectable 8 milliGRAM(s) IV Push every 8 hours PRN    10. OOB as tolerated, physical therapy consult if needed     11. Monitor coags / fibrinogen 2x week, vitamin K as needed     12. Monitor closely for clinical changes, monitor for fevers     13. Emotional support provided, plan of care discussed and questions addressed     14. Patient education done regarding chemotherapy prep, plan of care, restrictions and discharge planning     15. Continue regular social work input        I have written the above note for Dr. Quiñones who performed service with me in the room.   Brandon Hansen NP-C ([phone])    I have seen and examined patient with NP, I agree with above note as scribed. 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    + rash resolving    Denies tongue, throat pain, dyspnea, cough, nausea, vomiting, diarrhea, abdominal pain                                                                                                                                                    O: Vitals:   Vital Signs Last 24 Hrs  T(C): 36.4 (25 Oct 2019 06:14), Max: 36.6 (24 Oct 2019 21:46)  T(F): 97.5 (25 Oct 2019 06:14), Max: 97.8 (24 Oct 2019 21:46)  HR: 84 (25 Oct 2019 06:14) (76 - 88)  BP: 115/72 (25 Oct 2019 06:14) (115/72 - 144/86)  BP(mean): --  RR: 18 (25 Oct 2019 06:14) (18 - 18)  SpO2: 96% (25 Oct 2019 06:14) (96% - 98%)    Admit weight: 81.5 kg  Daily weight: 79 kg    Intake / Output:   10-24 @ 07:01  -  10-25 @ 07:00  --------------------------------------------------------  IN: 1573.4 mL / OUT: 1900 mL / NET: -326.6 mL    PE:    Oropharynx: no ulceration or erythema  Oral Mucositis:   -                                             Grade: -  CVS: RRR, normal S1 S2  Lungs: CTA bilaterally   Abdomen: soft, + normoactive BS, NT, ND  Extremities: warm, no edema in BLE . Toe amputation  Gastric Mucositis:     -                                             Grade: -  Intestinal Mucositis:      -                                        Grade: -  Skin: patchy erythematous rash back and upper chest resolving  TLC:  C/D/I  Neuro: Alert and oriented x 3  Pain: none     Labs:                          7.2    0.32  )-----------( 6        ( 25 Oct 2019 07:10 )             21.0   Mean Cell Volume : 87.5 fl  Mean Cell Hemoglobin : 30.0 pg  Mean Cell Hemoglobin Concentration : 34.3 gm/dL  Auto Neutrophil # : x  Auto Lymphocyte # : x  Auto Monocyte # : x  Auto Eosinophil # : x  Auto Basophil # : x  Auto Neutrophil % : x  Auto Lymphocyte % : x  Auto Monocyte % : x  Auto Eosinophil % : x  Auto Basophil % : x    10-25    140  |  106  |  13  ----------------------------<  104<H>  4.7   |  25  |  0.86    Ca    9.3      25 Oct 2019 07:10  Phos  4.0     10-25  Mg     1.6     10-25    TPro  5.4<L>  /  Alb  3.2<L>  /  TBili  0.3  /  DBili  <0.1  /  AST  9<L>  /  ALT  7<L>  /  AlkPhos  68  10-25  Mg 1.6  Phos 4.0  PT/INR - ( 24 Oct 2019 06:53 )   PT: 13.5 sec;   INR: 1.18 ratio    PTT - ( 24 Oct 2019 06:53 )  PTT:30.0 sec    Uric Acid --    Cultures:   Culture - Blood (10.12.19 @ 22:28)    Specimen Source: .Blood    Culture Results:   No growth at 5 days.    Culture - Urine (10.13.19 @ 04:27)    Specimen Source: .Urine    Culture Results:   No growth    Radiology:    Xray Chest 1 View- PORTABLE-Routine (10.12.19 @ 09:00) >  Clear lungs.    Meds:   Antimicrobials:   acyclovir   Oral Tab/Cap 400 milliGRAM(s) Oral every 8 hours  cefepime   IVPB 2000 milliGRAM(s) IV Intermittent every 8 hours  clotrimazole Lozenge 1 Lozenge Oral five times a day  voriconazole 200 milliGRAM(s) Oral every 12 hours    Heme / Onc:   heparin  Infusion 339 Unit(s)/Hr IV Continuous <Continuous>    GI:  docusate sodium 100 milliGRAM(s) Oral three times a day  pantoprazole    Tablet 40 milliGRAM(s) Oral before breakfast  senna 2 Tablet(s) Oral at bedtime  sodium bicarbonate Mouth Rinse 10 milliLiter(s) Swish and Spit five times a day  ursodiol Capsule 300 milliGRAM(s) Oral two times a day with meals    Cardiovascular:   enalapril 2.5 milliGRAM(s) Oral daily  metoprolol succinate ER 12.5 milliGRAM(s) Oral two times a day  tamsulosin 0.4 milliGRAM(s) Oral at bedtime    Immunologic:   filgrastim-sndz Injectable 480 MICROGram(s) SubCutaneous daily  immune   globulin 10% (GAMMAGARD) IVPB 30 Gram(s) IV Intermittent <User Schedule>  mycophenolate mofetil 1000 milliGRAM(s) Oral <User Schedule>  tacrolimus 1.5 milliGRAM(s) Oral two times a day    Other medications:   acetaminophen   Tablet .. 650 milliGRAM(s) Oral every 6 hours  acetaminophen   Tablet .. 650 milliGRAM(s) Oral <User Schedule>  ALPRAZolam 1 milliGRAM(s) Oral at bedtime  Biotene Dry Mouth Oral Rinse 5 milliLiter(s) Swish and Spit five times a day  chlorhexidine 4% Liquid 1 Application(s) Topical <User Schedule>  diphenhydrAMINE   Injectable 25 milliGRAM(s) IV Push <User Schedule>  finasteride 5 milliGRAM(s) Oral daily  folic acid 1 milliGRAM(s) Oral daily  lidocaine/prilocaine Cream 1 Application(s) Topical daily  magnesium oxide 400 milliGRAM(s) Oral three times a day with meals  multivitamin 1 Tablet(s) Oral daily  sertraline 50 milliGRAM(s) Oral at bedtime  sodium chloride 0.9%. 1000 milliLiter(s) IV Continuous <Continuous>    PRN:   acetaminophen   Tablet .. 650 milliGRAM(s) Oral every 6 hours PRN  acetaminophen  IVPB .. 1000 milliGRAM(s) IV Intermittent once PRN  diphenhydrAMINE   Injectable 25 milliGRAM(s) IV Push every 4 hours PRN  hydrocortisone sodium succinate Injectable 50 milliGRAM(s) IV Push once PRN  metoclopramide Injectable 10 milliGRAM(s) IV Push every 6 hours PRN  ondansetron Injectable 8 milliGRAM(s) IV Push every 8 hours PRN  sodium chloride 0.9% lock flush 10 milliLiter(s) IV Push every 1 hour PRN      A/P:  59 year old male with a history of AML  Post Allogeneic PBSCT day +16  Strict I&O, daily weights, prn diuresis   Cardiology following   10/14- Started Tacro, Cellcept and Zarxio  Afebrile, neutropenic - continue Cefepime.  10/11- Patient will need split units of  PRBC if need to be transfused due to low EF.  10/23-  Platelets transfusion reaction yesterday, transfusion workup completed.    1. Infectious Disease:   acyclovir   Oral Tab/Cap 400 milliGRAM(s) Oral every 8 hours  cefepime   IVPB 2000 milliGRAM(s) IV Intermittent every 8 hours  clotrimazole Lozenge 1 Lozenge Oral five times a day  voriconazole 200 milliGRAM(s) Oral every 12 hours    2. VOD Prophylaxis: Actigall, Glutamine, Heparin (dosed at 100 units / kg / day)     3. GI Prophylaxis:    pantoprazole    Tablet 40 milliGRAM(s) Oral before breakfast    4. Mouthcare - NS / NaHCO3 rinses, Mycelex, Biotene; Skin care     5. GVHD prophylaxis   mycophenolate mofetil 1000 milliGRAM(s) Oral <User Schedule>  tacrolimus 1.5 milliGRAM(s) Oral two times a day  CTX days + 3, + 4     6. Transfuse & replete electrolytes prn   magnesium oxide 400 milliGRAM(s) Oral three times a day with meals    7. IV hydration, daily weights, strict I&O, prn diuresis     8. PO intake as tolerated, nutrition follow up as needed, MVI, folic acid     9. Antiemetics, anti-diarrhea medications:   metoclopramide Injectable 10 milliGRAM(s) IV Push every 6 hours PRN  ondansetron Injectable 8 milliGRAM(s) IV Push every 8 hours PRN    10. OOB as tolerated, physical therapy consult if needed     11. Monitor coags / fibrinogen 2x week, vitamin K as needed     12. Monitor closely for clinical changes, monitor for fevers     13. Emotional support provided, plan of care discussed and questions addressed     14. Patient education done regarding chemotherapy prep, plan of care, restrictions and discharge planning     15. Continue regular social work input      I have written the above note for Dr. Quiñones who performed service with me in the room.   Brandon Hansen  NP-C ([phone])    I have seen and examined patient with NP, I agree with above note as scribed.

## 2019-10-25 NOTE — PROGRESS NOTE ADULT - ASSESSMENT
59 year old male, with previously diagnosed acute myeloid leukemia. s/p induction chemotherapy with Daunorubicin/Cytarabine and HIDAC consolidation chemotherapy, now admitted for Haplo-identical stem cell transplant from son:    - No evidence of volume overload at present.  He is on fluids and has been getting IV Lasix prn to maintain net negative.  His recent CXR is clear.  - Continue to maintain strict I's and O's, and to monitor for signs of volume overload, which he is at risk for.  - No evidence of acute ischemia  - BP is overall stable  - Continue ACEI  and BB at current doses as tolerated by BP.    - On heparin gtt for hx of dvt/pe and hypercoagulable state, and adjust rate per protocol. Monitor platelet count has decreased overall.  No bleeding, though is clearly at risk.  - Monitor and replete lytes  - To follow while admitted

## 2019-10-26 LAB
ALBUMIN SERPL ELPH-MCNC: 3.4 G/DL — SIGNIFICANT CHANGE UP (ref 3.3–5)
ALP SERPL-CCNC: 68 U/L — SIGNIFICANT CHANGE UP (ref 40–120)
ALT FLD-CCNC: 9 U/L — LOW (ref 10–45)
ANION GAP SERPL CALC-SCNC: 12 MMOL/L — SIGNIFICANT CHANGE UP (ref 5–17)
ANISOCYTOSIS BLD QL: SLIGHT — SIGNIFICANT CHANGE UP
AST SERPL-CCNC: 9 U/L — LOW (ref 10–40)
BASOPHILS # BLD AUTO: 0.01 K/UL — SIGNIFICANT CHANGE UP (ref 0–0.2)
BASOPHILS NFR BLD AUTO: 1.8 % — SIGNIFICANT CHANGE UP (ref 0–2)
BILIRUB SERPL-MCNC: 0.3 MG/DL — SIGNIFICANT CHANGE UP (ref 0.2–1.2)
BUN SERPL-MCNC: 15 MG/DL — SIGNIFICANT CHANGE UP (ref 7–23)
CALCIUM SERPL-MCNC: 9.4 MG/DL — SIGNIFICANT CHANGE UP (ref 8.4–10.5)
CHLORIDE SERPL-SCNC: 105 MMOL/L — SIGNIFICANT CHANGE UP (ref 96–108)
CO2 SERPL-SCNC: 25 MMOL/L — SIGNIFICANT CHANGE UP (ref 22–31)
CREAT SERPL-MCNC: 0.94 MG/DL — SIGNIFICANT CHANGE UP (ref 0.5–1.3)
DACRYOCYTES BLD QL SMEAR: SLIGHT — SIGNIFICANT CHANGE UP
DOHLE BOD BLD QL SMEAR: PRESENT — SIGNIFICANT CHANGE UP
EOSINOPHIL # BLD AUTO: 0 K/UL — SIGNIFICANT CHANGE UP (ref 0–0.5)
EOSINOPHIL NFR BLD AUTO: 0 % — SIGNIFICANT CHANGE UP (ref 0–6)
GLUCOSE SERPL-MCNC: 92 MG/DL — SIGNIFICANT CHANGE UP (ref 70–99)
HCT VFR BLD CALC: 20 % — CRITICAL LOW (ref 39–50)
HGB BLD-MCNC: 6.5 G/DL — CRITICAL LOW (ref 13–17)
LDH SERPL L TO P-CCNC: 97 U/L — SIGNIFICANT CHANGE UP (ref 50–242)
LYMPHOCYTES # BLD AUTO: 0.01 K/UL — LOW (ref 1–3.3)
LYMPHOCYTES # BLD AUTO: 0.9 % — LOW (ref 13–44)
MAGNESIUM SERPL-MCNC: 1.6 MG/DL — SIGNIFICANT CHANGE UP (ref 1.6–2.6)
MANUAL SMEAR VERIFICATION: SIGNIFICANT CHANGE UP
MCHC RBC-ENTMCNC: 28.8 PG — SIGNIFICANT CHANGE UP (ref 27–34)
MCHC RBC-ENTMCNC: 32.5 GM/DL — SIGNIFICANT CHANGE UP (ref 32–36)
MCV RBC AUTO: 88.5 FL — SIGNIFICANT CHANGE UP (ref 80–100)
MONOCYTES # BLD AUTO: 0.12 K/UL — SIGNIFICANT CHANGE UP (ref 0–0.9)
MONOCYTES NFR BLD AUTO: 14.3 % — HIGH (ref 2–14)
NEUTROPHILS # BLD AUTO: 0.69 K/UL — LOW (ref 1.8–7.4)
NEUTROPHILS NFR BLD AUTO: 78.6 % — HIGH (ref 43–77)
NEUTS BAND # BLD: 4.4 % — SIGNIFICANT CHANGE UP (ref 0–8)
PHOSPHATE SERPL-MCNC: 4.1 MG/DL — SIGNIFICANT CHANGE UP (ref 2.5–4.5)
PLAT MORPH BLD: NORMAL — SIGNIFICANT CHANGE UP
PLATELET # BLD AUTO: 36 K/UL — LOW (ref 150–400)
POIKILOCYTOSIS BLD QL AUTO: SLIGHT — SIGNIFICANT CHANGE UP
POTASSIUM SERPL-MCNC: 4 MMOL/L — SIGNIFICANT CHANGE UP (ref 3.5–5.3)
POTASSIUM SERPL-SCNC: 4 MMOL/L — SIGNIFICANT CHANGE UP (ref 3.5–5.3)
PROT SERPL-MCNC: 5.5 G/DL — LOW (ref 6–8.3)
RBC # BLD: 2.26 M/UL — LOW (ref 4.2–5.8)
RBC # FLD: 16.9 % — HIGH (ref 10.3–14.5)
RBC BLD AUTO: ABNORMAL
SODIUM SERPL-SCNC: 142 MMOL/L — SIGNIFICANT CHANGE UP (ref 135–145)
TOXIC GRANULES BLD QL SMEAR: PRESENT — SIGNIFICANT CHANGE UP
WBC # BLD: 0.83 K/UL — CRITICAL LOW (ref 3.8–10.5)
WBC # FLD AUTO: 0.83 K/UL — CRITICAL LOW (ref 3.8–10.5)

## 2019-10-26 PROCEDURE — 99291 CRITICAL CARE FIRST HOUR: CPT

## 2019-10-26 PROCEDURE — 99232 SBSQ HOSP IP/OBS MODERATE 35: CPT

## 2019-10-26 RX ORDER — ALPRAZOLAM 0.25 MG
1 TABLET ORAL AT BEDTIME
Refills: 0 | Status: DISCONTINUED | OUTPATIENT
Start: 2019-10-26 | End: 2019-11-01

## 2019-10-26 RX ADMIN — Medication 5 MILLILITER(S): at 12:49

## 2019-10-26 RX ADMIN — MAGNESIUM OXIDE 400 MG ORAL TABLET 400 MILLIGRAM(S): 241.3 TABLET ORAL at 08:31

## 2019-10-26 RX ADMIN — URSODIOL 300 MILLIGRAM(S): 250 TABLET, FILM COATED ORAL at 18:14

## 2019-10-26 RX ADMIN — MYCOPHENOLATE MOFETIL 1000 MILLIGRAM(S): 250 CAPSULE ORAL at 13:29

## 2019-10-26 RX ADMIN — CEFEPIME 100 MILLIGRAM(S): 1 INJECTION, POWDER, FOR SOLUTION INTRAMUSCULAR; INTRAVENOUS at 21:33

## 2019-10-26 RX ADMIN — PANTOPRAZOLE SODIUM 40 MILLIGRAM(S): 20 TABLET, DELAYED RELEASE ORAL at 06:08

## 2019-10-26 RX ADMIN — Medication 5 MILLILITER(S): at 08:32

## 2019-10-26 RX ADMIN — Medication 1 TABLET(S): at 12:47

## 2019-10-26 RX ADMIN — MAGNESIUM OXIDE 400 MG ORAL TABLET 400 MILLIGRAM(S): 241.3 TABLET ORAL at 12:47

## 2019-10-26 RX ADMIN — SERTRALINE 50 MILLIGRAM(S): 25 TABLET, FILM COATED ORAL at 21:33

## 2019-10-26 RX ADMIN — Medication 480 MICROGRAM(S): at 12:53

## 2019-10-26 RX ADMIN — MAGNESIUM OXIDE 400 MG ORAL TABLET 400 MILLIGRAM(S): 241.3 TABLET ORAL at 18:14

## 2019-10-26 RX ADMIN — Medication 10 MILLILITER(S): at 20:19

## 2019-10-26 RX ADMIN — Medication 10 MILLILITER(S): at 18:06

## 2019-10-26 RX ADMIN — Medication 1 LOZENGE: at 20:18

## 2019-10-26 RX ADMIN — Medication 1 LOZENGE: at 18:15

## 2019-10-26 RX ADMIN — MYCOPHENOLATE MOFETIL 1000 MILLIGRAM(S): 250 CAPSULE ORAL at 08:32

## 2019-10-26 RX ADMIN — SODIUM CHLORIDE 20 MILLILITER(S): 9 INJECTION INTRAMUSCULAR; INTRAVENOUS; SUBCUTANEOUS at 20:18

## 2019-10-26 RX ADMIN — Medication 2.5 MILLIGRAM(S): at 06:06

## 2019-10-26 RX ADMIN — Medication 25 MILLIGRAM(S): at 08:32

## 2019-10-26 RX ADMIN — TAMSULOSIN HYDROCHLORIDE 0.4 MILLIGRAM(S): 0.4 CAPSULE ORAL at 21:33

## 2019-10-26 RX ADMIN — CHLORHEXIDINE GLUCONATE 1 APPLICATION(S): 213 SOLUTION TOPICAL at 08:33

## 2019-10-26 RX ADMIN — Medication 5 MILLILITER(S): at 20:18

## 2019-10-26 RX ADMIN — Medication 1 LOZENGE: at 08:32

## 2019-10-26 RX ADMIN — Medication 400 MILLIGRAM(S): at 13:28

## 2019-10-26 RX ADMIN — URSODIOL 300 MILLIGRAM(S): 250 TABLET, FILM COATED ORAL at 08:31

## 2019-10-26 RX ADMIN — Medication 12.5 MILLIGRAM(S): at 06:07

## 2019-10-26 RX ADMIN — Medication 10 MILLILITER(S): at 08:28

## 2019-10-26 RX ADMIN — Medication 1 LOZENGE: at 23:19

## 2019-10-26 RX ADMIN — TACROLIMUS 1.5 MILLIGRAM(S): 5 CAPSULE ORAL at 06:05

## 2019-10-26 RX ADMIN — TACROLIMUS 1.5 MILLIGRAM(S): 5 CAPSULE ORAL at 18:14

## 2019-10-26 RX ADMIN — FINASTERIDE 5 MILLIGRAM(S): 5 TABLET, FILM COATED ORAL at 12:54

## 2019-10-26 RX ADMIN — Medication 1 MILLIGRAM(S): at 12:51

## 2019-10-26 RX ADMIN — VORICONAZOLE 200 MILLIGRAM(S): 10 INJECTION, POWDER, LYOPHILIZED, FOR SOLUTION INTRAVENOUS at 06:06

## 2019-10-26 RX ADMIN — Medication 1 MILLIGRAM(S): at 21:33

## 2019-10-26 RX ADMIN — Medication 400 MILLIGRAM(S): at 06:07

## 2019-10-26 RX ADMIN — Medication 400 MILLIGRAM(S): at 21:33

## 2019-10-26 RX ADMIN — CEFEPIME 100 MILLIGRAM(S): 1 INJECTION, POWDER, FOR SOLUTION INTRAMUSCULAR; INTRAVENOUS at 13:28

## 2019-10-26 RX ADMIN — Medication 5 MILLILITER(S): at 23:19

## 2019-10-26 RX ADMIN — HEPARIN SODIUM 3.39 UNIT(S)/HR: 5000 INJECTION INTRAVENOUS; SUBCUTANEOUS at 20:18

## 2019-10-26 RX ADMIN — Medication 5 MILLILITER(S): at 18:15

## 2019-10-26 RX ADMIN — CEFEPIME 100 MILLIGRAM(S): 1 INJECTION, POWDER, FOR SOLUTION INTRAMUSCULAR; INTRAVENOUS at 06:04

## 2019-10-26 RX ADMIN — Medication 1 LOZENGE: at 12:49

## 2019-10-26 RX ADMIN — MYCOPHENOLATE MOFETIL 1000 MILLIGRAM(S): 250 CAPSULE ORAL at 20:19

## 2019-10-26 RX ADMIN — VORICONAZOLE 200 MILLIGRAM(S): 10 INJECTION, POWDER, LYOPHILIZED, FOR SOLUTION INTRAVENOUS at 18:14

## 2019-10-26 RX ADMIN — Medication 10 MILLILITER(S): at 12:54

## 2019-10-26 RX ADMIN — Medication 10 MILLILITER(S): at 23:19

## 2019-10-26 NOTE — PROGRESS NOTE ADULT - SUBJECTIVE AND OBJECTIVE BOX
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    + rash resolving    Denies tongue, throat pain, dyspnea, cough, nausea, vomiting, diarrhea, abdominal pain        O: Vitals:   Vital Signs Last 24 Hrs  T(C): 36.4 (26 Oct 2019 05:58), Max: 36.7 (25 Oct 2019 17:16)  T(F): 97.5 (26 Oct 2019 05:58), Max: 98.1 (25 Oct 2019 17:16)  HR: 79 (26 Oct 2019 05:58) (68 - 102)  BP: 100/86 (26 Oct 2019 05:58) (100/86 - 142/81)  BP(mean): --  RR: 18 (26 Oct 2019 05:58) (18 - 18)  SpO2: 95% (26 Oct 2019 05:58) (95% - 100%)    Admit weight: 81.5 kg  Daily     Daily Weight in k (25 Oct 2019 09:40)    Intake / Output:   10-25 @ 07:01  -  10-26 @ 07:00  --------------------------------------------------------  IN: 1581 mL / OUT: 1050 mL / NET: 531 mL    10-26 @ 07:01  -  10-26 @ 09:01  --------------------------------------------------------  IN: 0 mL / OUT: 700 mL / NET: -700 mL      PE:    Oropharynx: no ulceration or erythema  Oral Mucositis:   -                                             Grade: -  CVS: RRR, normal S1 S2  Lungs: CTA bilaterally   Abdomen: soft, + normoactive BS, NT, ND  Extremities: warm, no edema in BLE . Toe amputation  Gastric Mucositis:     -                                             Grade: -  Intestinal Mucositis:      -                                        Grade: -  Skin: patchy erythematous rash back and upper chest resolving  TLC:  C/D/I  Neuro: Alert and oriented x 3  Pain: none           Labs:   CBC Full  -  ( 26 Oct 2019 06:38 )  WBC Count : 0.83 K/uL  Hemoglobin : 6.5 g/dL  Hematocrit : 20.0 %  Platelet Count - Automated : 36 K/uL  Mean Cell Volume : 88.5 fl  Mean Cell Hemoglobin : 28.8 pg  Mean Cell Hemoglobin Concentration : 32.5 gm/dL  Auto Neutrophil # : 0.69 K/uL  Auto Lymphocyte # : 0.01 K/uL  Auto Monocyte # : 0.12 K/uL  Auto Eosinophil # : 0.00 K/uL  Auto Basophil # : 0.01 K/uL  Auto Neutrophil % : 78.6 %  Auto Lymphocyte % : 0.9 %  Auto Monocyte % : 14.3 %  Auto Eosinophil % : 0.0 %  Auto Basophil % : 1.8 %                          6.5    0.83  )-----------( 36       ( 26 Oct 2019 06:38 )             20.0     10-26    142  |  105  |  15  ----------------------------<  92  4.0   |  25  |  0.94    Ca    9.4      26 Oct 2019 06:50  Phos  4.1     10-26  Mg     1.6     10-26    TPro  5.5<L>  /  Alb  3.4  /  TBili  0.3  /  DBili  x   /  AST  9<L>  /  ALT  9<L>  /  AlkPhos  68  10-26      LIVER FUNCTIONS - ( 26 Oct 2019 06:50 )  Alb: 3.4 g/dL / Pro: 5.5 g/dL / ALK PHOS: 68 U/L / ALT: 9 U/L / AST: 9 U/L / GGT: x           Lactate Dehydrogenase, Serum: 97 U/L (10-26 @ 06:50)       10-25 @ 09:25  Tacrolimus 8.3                Cyclosporine --          Cultures:         Radiology:       Meds:   Antimicrobials:   acyclovir   Oral Tab/Cap 400 milliGRAM(s) Oral every 8 hours  cefepime   IVPB 2000 milliGRAM(s) IV Intermittent every 8 hours  clotrimazole Lozenge 1 Lozenge Oral five times a day  voriconazole 200 milliGRAM(s) Oral every 12 hours      Heme / Onc:   heparin  Infusion 339 Unit(s)/Hr IV Continuous <Continuous>      GI:  docusate sodium 100 milliGRAM(s) Oral three times a day  pantoprazole    Tablet 40 milliGRAM(s) Oral before breakfast  senna 2 Tablet(s) Oral at bedtime  sodium bicarbonate Mouth Rinse 10 milliLiter(s) Swish and Spit five times a day  ursodiol Capsule 300 milliGRAM(s) Oral two times a day with meals      Cardiovascular:   enalapril 2.5 milliGRAM(s) Oral daily  metoprolol succinate ER 12.5 milliGRAM(s) Oral two times a day  tamsulosin 0.4 milliGRAM(s) Oral at bedtime      Immunologic:   filgrastim-sndz Injectable 480 MICROGram(s) SubCutaneous daily  immune   globulin 10% (GAMMAGARD) IVPB 30 Gram(s) IV Intermittent <User Schedule>  mycophenolate mofetil 1000 milliGRAM(s) Oral <User Schedule>  tacrolimus 1.5 milliGRAM(s) Oral two times a day      Other medications:   acetaminophen   Tablet .. 650 milliGRAM(s) Oral every 6 hours  acetaminophen   Tablet .. 650 milliGRAM(s) Oral <User Schedule>  ALPRAZolam 1 milliGRAM(s) Oral at bedtime  Biotene Dry Mouth Oral Rinse 5 milliLiter(s) Swish and Spit five times a day  chlorhexidine 4% Liquid 1 Application(s) Topical <User Schedule>  diphenhydrAMINE   Injectable 25 milliGRAM(s) IV Push <User Schedule>  finasteride 5 milliGRAM(s) Oral daily  folic acid 1 milliGRAM(s) Oral daily  lidocaine/prilocaine Cream 1 Application(s) Topical daily  magnesium oxide 400 milliGRAM(s) Oral three times a day with meals  multivitamin 1 Tablet(s) Oral daily  sertraline 50 milliGRAM(s) Oral at bedtime  sodium chloride 0.9%. 1000 milliLiter(s) IV Continuous <Continuous>      PRN:   acetaminophen   Tablet .. 650 milliGRAM(s) Oral every 6 hours PRN  acetaminophen  IVPB .. 1000 milliGRAM(s) IV Intermittent once PRN  diphenhydrAMINE   Injectable 25 milliGRAM(s) IV Push every 4 hours PRN  metoclopramide Injectable 10 milliGRAM(s) IV Push every 6 hours PRN  ondansetron Injectable 8 milliGRAM(s) IV Push every 8 hours PRN  sodium chloride 0.9% lock flush 10 milliLiter(s) IV Push every 1 hour PRN        A/P:  59 year old male with a history of AML  Post Allogeneic PBSCT day +17  Strict I&O, daily weights, prn diuresis   Cardiology following   10/14- Started Tacro, Cellcept and Zarxio  Afebrile, neutropenic - continue Cefepime.  10/11- Patient will need split units of  PRBC if need to be transfused due to low EF.  10/23-  Platelets transfusion reaction yesterday, transfusion workup completed.    1. Infectious Disease:   acyclovir   Oral Tab/Cap 400 milliGRAM(s) Oral every 8 hours  cefepime   IVPB 2000 milliGRAM(s) IV Intermittent every 8 hours  clotrimazole Lozenge 1 Lozenge Oral five times a day  voriconazole 200 milliGRAM(s) Oral every 12 hours    2. VOD Prophylaxis: Actigall, Glutamine, Heparin (dosed at 100 units / kg / day)     3. GI Prophylaxis:    pantoprazole    Tablet 40 milliGRAM(s) Oral before breakfast    4. Mouthcare - NS / NaHCO3 rinses, Mycelex, Biotene; Skin care     5. GVHD prophylaxis   mycophenolate mofetil 1000 milliGRAM(s) Oral <User Schedule>  tacrolimus 1.5 milliGRAM(s) Oral two times a day  CTX days + 3, + 4     6. Transfuse & replete electrolytes prn   magnesium oxide 400 milliGRAM(s) Oral three times a day with meals    7. IV hydration, daily weights, strict I&O, prn diuresis     8. PO intake as tolerated, nutrition follow up as needed, MVI, folic acid     9. Antiemetics, anti-diarrhea medications:   metoclopramide Injectable 10 milliGRAM(s) IV Push every 6 hours PRN  ondansetron Injectable 8 milliGRAM(s) IV Push every 8 hours PRN    10. OOB as tolerated, physical therapy consult if needed     11. Monitor coags / fibrinogen 2x week, vitamin K as needed     12. Monitor closely for clinical changes, monitor for fevers     13. Emotional support provided, plan of care discussed and questions addressed     14. Patient education done regarding chemotherapy prep, plan of care, restrictions and discharge planning     15. Continue regular social work input      I have written the above note for Dr. Goldberg who performed service with me in the room.   Suki Sage NP-C ([phone])    I have seen and examined patient with NP, I agree with above note as scribed. 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    + infrequent cough, gum discomfort     Denies tongue, throat pain, dyspnea, nausea, vomiting, diarrhea, abdominal pain        O: Vitals:   Vital Signs Last 24 Hrs  T(C): 36.4 (26 Oct 2019 05:58), Max: 36.7 (25 Oct 2019 17:16)  T(F): 97.5 (26 Oct 2019 05:58), Max: 98.1 (25 Oct 2019 17:16)  HR: 79 (26 Oct 2019 05:58) (68 - 102)  BP: 100/86 (26 Oct 2019 05:58) (100/86 - 142/81)  BP(mean): --  RR: 18 (26 Oct 2019 05:58) (18 - 18)  SpO2: 95% (26 Oct 2019 05:58) (95% - 100%)    Admit weight: 81.5 kg     Daily Weight in k.5 Kg  (26 Oct 2019 )    Intake / Output:   10-25 @ 07:01  -  10-26 @ 07:00  --------------------------------------------------------  IN: 1581 mL / OUT: 1050 mL / NET: 531 mL    10-26 @ 07:01  -  10-26 @ 09:01  --------------------------------------------------------  IN: 0 mL / OUT: 700 mL / NET: -700 mL      PE:    Oropharynx: no ulceration or erythema  Oral Mucositis:   -                                             Grade: -  CVS: RRR, normal S1 S2  Lungs: CTA bilaterally   Abdomen: soft, + normoactive BS, NT, ND  Extremities: warm, no edema in BLE . Toe amputation  Gastric Mucositis:     -                                             Grade: -  Intestinal Mucositis:      -                                        Grade: -  Skin: patchy erythematous rash back and upper chest resolving  TLC:  C/D/I  Neuro: Alert and oriented x 3  Pain: none           Labs:   CBC Full  -  ( 26 Oct 2019 06:38 )  WBC Count : 0.83 K/uL  Hemoglobin : 6.5 g/dL  Hematocrit : 20.0 %  Platelet Count - Automated : 36 K/uL  Mean Cell Volume : 88.5 fl  Mean Cell Hemoglobin : 28.8 pg  Mean Cell Hemoglobin Concentration : 32.5 gm/dL  Auto Neutrophil # : 0.69 K/uL  Auto Lymphocyte # : 0.01 K/uL  Auto Monocyte # : 0.12 K/uL  Auto Eosinophil # : 0.00 K/uL  Auto Basophil # : 0.01 K/uL  Auto Neutrophil % : 78.6 %  Auto Lymphocyte % : 0.9 %  Auto Monocyte % : 14.3 %  Auto Eosinophil % : 0.0 %  Auto Basophil % : 1.8 %                          6.5    0.83  )-----------( 36       ( 26 Oct 2019 06:38 )             20.0     10-26    142  |  105  |  15  ----------------------------<  92  4.0   |  25  |  0.94    Ca    9.4      26 Oct 2019 06:50  Phos  4.1     10-26  Mg     1.6     10-26    TPro  5.5<L>  /  Alb  3.4  /  TBili  0.3  /  DBili  x   /  AST  9<L>  /  ALT  9<L>  /  AlkPhos  68  10-26      LIVER FUNCTIONS - ( 26 Oct 2019 06:50 )  Alb: 3.4 g/dL / Pro: 5.5 g/dL / ALK PHOS: 68 U/L / ALT: 9 U/L / AST: 9 U/L / GGT: x           Lactate Dehydrogenase, Serum: 97 U/L (10-26 @ 06:50)       10-25 @ 09:25  Tacrolimus 8.3                Cyclosporine --        Radiology:    Xray Chest 1 View- PORTABLE-Routine (10.12.19 @ 09:00) >  Clear lungs.        Meds:   Antimicrobials:   acyclovir   Oral Tab/Cap 400 milliGRAM(s) Oral every 8 hours  cefepime   IVPB 2000 milliGRAM(s) IV Intermittent every 8 hours  clotrimazole Lozenge 1 Lozenge Oral five times a day  voriconazole 200 milliGRAM(s) Oral every 12 hours      Heme / Onc:   heparin  Infusion 339 Unit(s)/Hr IV Continuous <Continuous>      GI:  docusate sodium 100 milliGRAM(s) Oral three times a day  pantoprazole    Tablet 40 milliGRAM(s) Oral before breakfast  senna 2 Tablet(s) Oral at bedtime  sodium bicarbonate Mouth Rinse 10 milliLiter(s) Swish and Spit five times a day  ursodiol Capsule 300 milliGRAM(s) Oral two times a day with meals      Cardiovascular:   enalapril 2.5 milliGRAM(s) Oral daily  metoprolol succinate ER 12.5 milliGRAM(s) Oral two times a day  tamsulosin 0.4 milliGRAM(s) Oral at bedtime      Immunologic:   filgrastim-sndz Injectable 480 MICROGram(s) SubCutaneous daily  immune   globulin 10% (GAMMAGARD) IVPB 30 Gram(s) IV Intermittent <User Schedule>  mycophenolate mofetil 1000 milliGRAM(s) Oral <User Schedule>  tacrolimus 1.5 milliGRAM(s) Oral two times a day      Other medications:   acetaminophen   Tablet .. 650 milliGRAM(s) Oral every 6 hours  acetaminophen   Tablet .. 650 milliGRAM(s) Oral <User Schedule>  ALPRAZolam 1 milliGRAM(s) Oral at bedtime  Biotene Dry Mouth Oral Rinse 5 milliLiter(s) Swish and Spit five times a day  chlorhexidine 4% Liquid 1 Application(s) Topical <User Schedule>  diphenhydrAMINE   Injectable 25 milliGRAM(s) IV Push <User Schedule>  finasteride 5 milliGRAM(s) Oral daily  folic acid 1 milliGRAM(s) Oral daily  lidocaine/prilocaine Cream 1 Application(s) Topical daily  magnesium oxide 400 milliGRAM(s) Oral three times a day with meals  multivitamin 1 Tablet(s) Oral daily  sertraline 50 milliGRAM(s) Oral at bedtime  sodium chloride 0.9%. 1000 milliLiter(s) IV Continuous <Continuous>      PRN:   acetaminophen   Tablet .. 650 milliGRAM(s) Oral every 6 hours PRN  acetaminophen  IVPB .. 1000 milliGRAM(s) IV Intermittent once PRN  diphenhydrAMINE   Injectable 25 milliGRAM(s) IV Push every 4 hours PRN  metoclopramide Injectable 10 milliGRAM(s) IV Push every 6 hours PRN  ondansetron Injectable 8 milliGRAM(s) IV Push every 8 hours PRN  sodium chloride 0.9% lock flush 10 milliLiter(s) IV Push every 1 hour PRN        A/P:  59 year old male with a history of AML  Post Allogeneic PBSCT day +17  Strict I&O, daily weights, prn diuresis   Cardiology following   10/14- Started Tacro, Cellcept and Zarxio  Afebrile, neutropenic - continue Cefepime.  10/11- Patient will need split units of  PRBC if need to be transfused due to low EF.  10/23-  Platelets transfusion reaction yesterday, transfusion workup completed.    1. Infectious Disease:   acyclovir   Oral Tab/Cap 400 milliGRAM(s) Oral every 8 hours  cefepime   IVPB 2000 milliGRAM(s) IV Intermittent every 8 hours  clotrimazole Lozenge 1 Lozenge Oral five times a day  voriconazole 200 milliGRAM(s) Oral every 12 hours    2. VOD Prophylaxis: Actigall, Glutamine, Heparin (dosed at 100 units / kg / day)     3. GI Prophylaxis:    pantoprazole    Tablet 40 milliGRAM(s) Oral before breakfast    4. Mouthcare - NS / NaHCO3 rinses, Mycelex, Biotene; Skin care     5. GVHD prophylaxis   mycophenolate mofetil 1000 milliGRAM(s) Oral <User Schedule>  tacrolimus 1.5 milliGRAM(s) Oral two times a day  CTX days + 3, + 4     6. Transfuse & replete electrolytes prn   magnesium oxide 400 milliGRAM(s) Oral three times a day with meals  anemia - 1/2 PRBC x 2    7. IV hydration, daily weights, strict I&O, prn diuresis     8. PO intake as tolerated, nutrition follow up as needed, MVI, folic acid     9. Antiemetics, anti-diarrhea medications:   metoclopramide Injectable 10 milliGRAM(s) IV Push every 6 hours PRN  ondansetron Injectable 8 milliGRAM(s) IV Push every 8 hours PRN    10. OOB as tolerated, physical therapy consult if needed     11. Monitor coags / fibrinogen 2x week, vitamin K as needed     12. Monitor closely for clinical changes, monitor for fevers     13. Emotional support provided, plan of care discussed and questions addressed     14. Patient education done regarding chemotherapy prep, plan of care, restrictions and discharge planning     15. Continue regular social work input      I have written the above note for Dr. Goldberg who performed service with me in the room.   Suki Sage NP-C ([phone])    I have seen and examined patient with NP, I agree with above note as scribed.

## 2019-10-26 NOTE — PROGRESS NOTE ADULT - SUBJECTIVE AND OBJECTIVE BOX
[facility] - Ede Zamudio, Riki, Edgar, Gem, Tabitha Garcia  Office Number:  [phone]    Patient resting comfortably in bed in NAD.  Laying flat with no respiratory distress.  No complaints of chest pain, dyspnea, palpitations, PND, or orthopnea.  feeling well this morning    ROS: negative unless otherwise mentioned.    Telemetry:  off    MEDICATIONS  (STANDING):  acetaminophen   Tablet .. 650 milliGRAM(s) Oral every 6 hours  acetaminophen   Tablet .. 650 milliGRAM(s) Oral <User Schedule>  acyclovir   Oral Tab/Cap 400 milliGRAM(s) Oral every 8 hours  ALPRAZolam 1 milliGRAM(s) Oral at bedtime  Biotene Dry Mouth Oral Rinse 5 milliLiter(s) Swish and Spit five times a day  cefepime   IVPB 2000 milliGRAM(s) IV Intermittent every 8 hours  chlorhexidine 4% Liquid 1 Application(s) Topical <User Schedule>  clotrimazole Lozenge 1 Lozenge Oral five times a day  diphenhydrAMINE   Injectable 25 milliGRAM(s) IV Push <User Schedule>  docusate sodium 100 milliGRAM(s) Oral three times a day  enalapril 2.5 milliGRAM(s) Oral daily  filgrastim-sndz Injectable 480 MICROGram(s) SubCutaneous daily  finasteride 5 milliGRAM(s) Oral daily  folic acid 1 milliGRAM(s) Oral daily  heparin  Infusion 339 Unit(s)/Hr (3.39 mL/Hr) IV Continuous <Continuous>  immune   globulin 10% (GAMMAGARD) IVPB 30 Gram(s) IV Intermittent <User Schedule>  lidocaine/prilocaine Cream 1 Application(s) Topical daily  magnesium oxide 400 milliGRAM(s) Oral three times a day with meals  metoprolol succinate ER 12.5 milliGRAM(s) Oral two times a day  multivitamin 1 Tablet(s) Oral daily  mycophenolate mofetil 1000 milliGRAM(s) Oral <User Schedule>  pantoprazole    Tablet 40 milliGRAM(s) Oral before breakfast  senna 2 Tablet(s) Oral at bedtime  sertraline 50 milliGRAM(s) Oral at bedtime  sodium bicarbonate Mouth Rinse 10 milliLiter(s) Swish and Spit five times a day  sodium chloride 0.9%. 1000 milliLiter(s) (20 mL/Hr) IV Continuous <Continuous>  tacrolimus 1.5 milliGRAM(s) Oral two times a day  tamsulosin 0.4 milliGRAM(s) Oral at bedtime  ursodiol Capsule 300 milliGRAM(s) Oral two times a day with meals  voriconazole 200 milliGRAM(s) Oral every 12 hours    MEDICATIONS  (PRN):  acetaminophen   Tablet .. 650 milliGRAM(s) Oral every 6 hours PRN Temp greater or equal to 38C (100.4F), Mild Pain (1 - 3)  acetaminophen  IVPB .. 1000 milliGRAM(s) IV Intermittent once PRN Temp greater or equal to 38.5C (101.3F)  diphenhydrAMINE   Injectable 25 milliGRAM(s) IV Push every 4 hours PRN Allergy symptoms  metoclopramide Injectable 10 milliGRAM(s) IV Push every 6 hours PRN Nausea and/or Vomiting  ondansetron Injectable 8 milliGRAM(s) IV Push every 8 hours PRN Nausea and/or Vomiting  sodium chloride 0.9% lock flush 10 milliLiter(s) IV Push every 1 hour PRN Pre/post blood products, medications, blood draw, and to maintain line patency      Allergies    No Known Allergies    Intolerances        Vital Signs Last 24 Hrs  T(C): 37 (26 Oct 2019 09:30), Max: 37 (26 Oct 2019 09:30)  T(F): 98.6 (26 Oct 2019 09:30), Max: 98.6 (26 Oct 2019 09:30)  HR: 84 (26 Oct 2019 09:30) (78 - 102)  BP: 116/67 (26 Oct 2019 09:30) (100/86 - 142/81)  BP(mean): --  RR: 18 (26 Oct 2019 09:30) (18 - 18)  SpO2: 94% (26 Oct 2019 09:30) (94% - 99%)    I&O's Summary    25 Oct 2019 07:01  -  26 Oct 2019 07:00  --------------------------------------------------------  IN: 1581 mL / OUT: 1050 mL / NET: 531 mL    26 Oct 2019 07:01  -  26 Oct 2019 10:46  --------------------------------------------------------  IN: 0 mL / OUT: 700 mL / NET: -700 mL        ON EXAM:    Constitutional: well-nourished, well-developed, NAD   HEENT:  MMM, sclerae anicteric, conjunctivae clear, no oral cyanosis.  Pulmonary: Non-labored, breath sounds are clear bilaterally, No wheezing, rales or rhonchi  Cardiovascular: Regular, S1 and S2.  No murmur.  No rubs, gallops or clicks  Gastrointestinal: Bowel Sounds present, soft, nontender.   Lymph: No peripheral edema.   Neurological: Alert, no focal deficits  Skin: No rashes.  Psych:  Mood & affect appropriate    LABS: All Labs Reviewed:                        6.5    0.83  )-----------( 36       ( 26 Oct 2019 06:38 )             20.0                         7.2    0.32  )-----------( 6        ( 25 Oct 2019 07:10 )             21.0                         7.7    <0.10 )-----------( 12       ( 24 Oct 2019 06:53 )             23.5     26 Oct 2019 06:50    142    |  105    |  15     ----------------------------<  92     4.0     |  25     |  0.94   25 Oct 2019 07:10    140    |  106    |  13     ----------------------------<  104    4.7     |  25     |  0.86   24 Oct 2019 06:53    139    |  104    |  15     ----------------------------<  98     4.4     |  24     |  0.92     Ca    9.4        26 Oct 2019 06:50  Ca    9.3        25 Oct 2019 07:10  Ca    9.2        24 Oct 2019 06:53  Phos  4.1       26 Oct 2019 06:50  Phos  4.0       25 Oct 2019 07:10  Phos  3.8       24 Oct 2019 06:53  Mg     1.6       26 Oct 2019 06:50  Mg     1.6       25 Oct 2019 07:10  Mg     1.7       24 Oct 2019 06:53    TPro  5.5    /  Alb  3.4    /  TBili  0.3    /  DBili  x      /  AST  9      /  ALT  9      /  AlkPhos  68     26 Oct 2019 06:50  TPro  5.4    /  Alb  3.2    /  TBili  0.3    /  DBili  <0.1   /  AST  9      /  ALT  7      /  AlkPhos  68     25 Oct 2019 07:10  TPro  5.8    /  Alb  3.5    /  TBili  0.4    /  DBili  x      /  AST  9      /  ALT  8      /  AlkPhos  70     24 Oct 2019 06:53          Blood Culture:

## 2019-10-26 NOTE — PROGRESS NOTE ADULT - ATTENDING COMMENTS
59 year old male, with previously diagnosed acute myeloid leukemia. s/p induction chemotherapy with Daunorubicin/Cytarabine and HIDAC consolidation chemotherapy, now admitted for Haplo-identical stem cell transplant from son. Conditioning regimen of Fludarabine, Cytoxan, and TBI. Other history includes non-ischemic cardiomyopathy (recent EF 25% 9/19/19), COLLEEN, DVT/PE, HTN, Factor V Leiden and amputation of right toe due to osteomyelitis.  Day + 16, s/p haploPBSCT, early engraftment  Started  Zarxio on day +5,  MMF and Tacrolimus. Check Tacrolimus levels on Monday and Thursday.   Prior fevers likely due to haplostorm,  s/p CTX 2/2.  Follow temps if persist can dose 1 mg/kg of solumedrol.  Blood/urine cultures from 10/10 and 10/12 negative.  Continue cefepime/voriconazole for now along with acyclovir prophylaxis.   Strict I&O, daily weights, prn diuresis   Renal insufficiency- monitor daily creatinine, normal today.   Anxiolytics, antinausea, antidiarrhea medications as needed.  Nutritional support.  Hx non-ischemic CM, followed by Dr. Luke Lyn, last Echo 9/19 (EF 25%).  Monitor for fluid overload.  VOD prophylaxis - low dose heparin gtt (dosed at 100 units / kg / day), glutamine supplementation, Actigall BID   GI prophylaxis - Protonix po QD.  Electrolyte support  Aggressive mouth care and skin care as per protocol.  OOB/ambulate 59 year old male, with previously diagnosed acute myeloid leukemia. s/p induction chemotherapy with Daunorubicin/Cytarabine and HIDAC consolidation chemotherapy, now admitted for Haplo-identical stem cell transplant from son. Conditioning regimen of Fludarabine, Cytoxan, and TBI. Other history includes non-ischemic cardiomyopathy (recent EF 25% 9/19/19), COLLEEN, DVT/PE, HTN, Factor V Leiden and amputation of right toe due to osteomyelitis.  Day + 17, s/p haploPBSCT, early engraftment  Started  Zarxio on day +5,  MMF and Tacrolimus. Check Tacrolimus levels on Monday and Thursday.   Prior fevers likely due to haplostorm,  s/p CTX 2/2.  Follow temps if persist can dose 1 mg/kg of solumedrol.  Blood/urine cultures from 10/10 and 10/12 negative.  Continue cefepime/voriconazole for now along with acyclovir prophylaxis.   Strict I&O, daily weights, prn diuresis   Renal insufficiency- monitor daily creatinine, normal today.   Anxiolytics, antinausea, antidiarrhea medications as needed.  Nutritional support.  Hx non-ischemic CM, followed by Dr. Luke Lyn, last Echo 9/19 (EF 25%).  Monitor for fluid overload.  VOD prophylaxis - low dose heparin gtt (dosed at 100 units / kg / day), glutamine supplementation, Actigall BID   GI prophylaxis - Protonix po QD.  Electrolyte support  Aggressive mouth care and skin care as per protocol.  OOB/ambulate

## 2019-10-26 NOTE — PROGRESS NOTE ADULT - ASSESSMENT
59 year old male, with previously diagnosed acute myeloid leukemia. s/p induction chemotherapy with Daunorubicin/Cytarabine and HIDAC consolidation chemotherapy, now admitted for Haplo-identical stem cell transplant from son:    - No evidence of volume overload at present.  He is on fluids and has been getting IV Lasix prn to maintain net negative.  His recent CXR is clear.  - Continue to maintain strict I's and O's, and to monitor for signs of volume overload, which he is at risk for. He is slightly positive over lat 24 hours.  - No evidence of acute ischemia  - BP is overall stable  - Continue ACEI  and BB at current doses as tolerated by BP.    - agree with PRBC transfusion to keep Hb>7. Can give him IV Lasix post transfusion.  - On heparin gtt for hx of dvt/pe and hypercoagulable state, and adjust rate per protocol. Monitor platelet count, now 36  No bleeding, though is clearly at risk.  - Monitor and replete lytes  - To follow while admitted

## 2019-10-27 LAB
ALBUMIN SERPL ELPH-MCNC: 3.5 G/DL — SIGNIFICANT CHANGE UP (ref 3.3–5)
ALP SERPL-CCNC: 72 U/L — SIGNIFICANT CHANGE UP (ref 40–120)
ALT FLD-CCNC: 10 U/L — SIGNIFICANT CHANGE UP (ref 10–45)
ANION GAP SERPL CALC-SCNC: 18 MMOL/L — HIGH (ref 5–17)
AST SERPL-CCNC: 8 U/L — LOW (ref 10–40)
BASOPHILS # BLD AUTO: 0 K/UL — SIGNIFICANT CHANGE UP (ref 0–0.2)
BASOPHILS NFR BLD AUTO: 0 % — SIGNIFICANT CHANGE UP (ref 0–2)
BILIRUB SERPL-MCNC: 0.3 MG/DL — SIGNIFICANT CHANGE UP (ref 0.2–1.2)
BLASTS # FLD: 1 % — HIGH (ref 0–0)
BUN SERPL-MCNC: 16 MG/DL — SIGNIFICANT CHANGE UP (ref 7–23)
CALCIUM SERPL-MCNC: 8.8 MG/DL — SIGNIFICANT CHANGE UP (ref 8.4–10.5)
CHLORIDE SERPL-SCNC: 102 MMOL/L — SIGNIFICANT CHANGE UP (ref 96–108)
CO2 SERPL-SCNC: 24 MMOL/L — SIGNIFICANT CHANGE UP (ref 22–31)
CREAT SERPL-MCNC: 1.04 MG/DL — SIGNIFICANT CHANGE UP (ref 0.5–1.3)
EOSINOPHIL # BLD AUTO: 0 K/UL — SIGNIFICANT CHANGE UP (ref 0–0.5)
EOSINOPHIL NFR BLD AUTO: 0 % — SIGNIFICANT CHANGE UP (ref 0–6)
GLUCOSE SERPL-MCNC: 98 MG/DL — SIGNIFICANT CHANGE UP (ref 70–99)
HCT VFR BLD CALC: 22.7 % — LOW (ref 39–50)
HGB BLD-MCNC: 7.6 G/DL — LOW (ref 13–17)
LDH SERPL L TO P-CCNC: 134 U/L — SIGNIFICANT CHANGE UP (ref 50–242)
LYMPHOCYTES # BLD AUTO: 0.04 K/UL — LOW (ref 1–3.3)
LYMPHOCYTES # BLD AUTO: 2 % — LOW (ref 13–44)
MAGNESIUM SERPL-MCNC: 1.4 MG/DL — LOW (ref 1.6–2.6)
MANUAL SMEAR VERIFICATION: SIGNIFICANT CHANGE UP
MCHC RBC-ENTMCNC: 29.1 PG — SIGNIFICANT CHANGE UP (ref 27–34)
MCHC RBC-ENTMCNC: 33.5 GM/DL — SIGNIFICANT CHANGE UP (ref 32–36)
MCV RBC AUTO: 87 FL — SIGNIFICANT CHANGE UP (ref 80–100)
MONOCYTES # BLD AUTO: 0.3 K/UL — SIGNIFICANT CHANGE UP (ref 0–0.9)
MONOCYTES NFR BLD AUTO: 17 % — HIGH (ref 2–14)
NEUTROPHILS # BLD AUTO: 1.4 K/UL — LOW (ref 1.8–7.4)
NEUTROPHILS NFR BLD AUTO: 79 % — HIGH (ref 43–77)
NRBC # BLD: 0 /100 — SIGNIFICANT CHANGE UP (ref 0–0)
PHOSPHATE SERPL-MCNC: 4.3 MG/DL — SIGNIFICANT CHANGE UP (ref 2.5–4.5)
PLAT MORPH BLD: NORMAL — SIGNIFICANT CHANGE UP
PLATELET # BLD AUTO: 26 K/UL — LOW (ref 150–400)
POTASSIUM SERPL-MCNC: 4.5 MMOL/L — SIGNIFICANT CHANGE UP (ref 3.5–5.3)
POTASSIUM SERPL-SCNC: 4.5 MMOL/L — SIGNIFICANT CHANGE UP (ref 3.5–5.3)
PROMYELOCYTES # FLD: 1 % — HIGH (ref 0–0)
PROT SERPL-MCNC: 5.6 G/DL — LOW (ref 6–8.3)
RBC # BLD: 2.61 M/UL — LOW (ref 4.2–5.8)
RBC # FLD: 16.9 % — HIGH (ref 10.3–14.5)
RBC BLD AUTO: SIGNIFICANT CHANGE UP
SODIUM SERPL-SCNC: 144 MMOL/L — SIGNIFICANT CHANGE UP (ref 135–145)
WBC # BLD: 1.77 K/UL — LOW (ref 3.8–10.5)
WBC # FLD AUTO: 1.77 K/UL — LOW (ref 3.8–10.5)

## 2019-10-27 PROCEDURE — 99291 CRITICAL CARE FIRST HOUR: CPT

## 2019-10-27 PROCEDURE — 99232 SBSQ HOSP IP/OBS MODERATE 35: CPT

## 2019-10-27 RX ORDER — MAGNESIUM SULFATE 500 MG/ML
2 VIAL (ML) INJECTION ONCE
Refills: 0 | Status: COMPLETED | OUTPATIENT
Start: 2019-10-27 | End: 2019-10-27

## 2019-10-27 RX ADMIN — MYCOPHENOLATE MOFETIL 1000 MILLIGRAM(S): 250 CAPSULE ORAL at 20:30

## 2019-10-27 RX ADMIN — Medication 10 MILLILITER(S): at 23:23

## 2019-10-27 RX ADMIN — Medication 1 MILLIGRAM(S): at 20:54

## 2019-10-27 RX ADMIN — TAMSULOSIN HYDROCHLORIDE 0.4 MILLIGRAM(S): 0.4 CAPSULE ORAL at 20:55

## 2019-10-27 RX ADMIN — VORICONAZOLE 200 MILLIGRAM(S): 10 INJECTION, POWDER, LYOPHILIZED, FOR SOLUTION INTRAVENOUS at 18:21

## 2019-10-27 RX ADMIN — Medication 5 MILLILITER(S): at 23:23

## 2019-10-27 RX ADMIN — URSODIOL 300 MILLIGRAM(S): 250 TABLET, FILM COATED ORAL at 08:20

## 2019-10-27 RX ADMIN — Medication 1 LOZENGE: at 23:23

## 2019-10-27 RX ADMIN — VORICONAZOLE 200 MILLIGRAM(S): 10 INJECTION, POWDER, LYOPHILIZED, FOR SOLUTION INTRAVENOUS at 05:28

## 2019-10-27 RX ADMIN — Medication 5 MILLILITER(S): at 16:40

## 2019-10-27 RX ADMIN — Medication 480 MICROGRAM(S): at 13:37

## 2019-10-27 RX ADMIN — CEFEPIME 100 MILLIGRAM(S): 1 INJECTION, POWDER, FOR SOLUTION INTRAMUSCULAR; INTRAVENOUS at 05:28

## 2019-10-27 RX ADMIN — Medication 10 MILLILITER(S): at 20:29

## 2019-10-27 RX ADMIN — Medication 400 MILLIGRAM(S): at 13:32

## 2019-10-27 RX ADMIN — PANTOPRAZOLE SODIUM 40 MILLIGRAM(S): 20 TABLET, DELAYED RELEASE ORAL at 05:28

## 2019-10-27 RX ADMIN — Medication 400 MILLIGRAM(S): at 20:55

## 2019-10-27 RX ADMIN — FINASTERIDE 5 MILLIGRAM(S): 5 TABLET, FILM COATED ORAL at 13:35

## 2019-10-27 RX ADMIN — Medication 12.5 MILLIGRAM(S): at 18:21

## 2019-10-27 RX ADMIN — CEFEPIME 100 MILLIGRAM(S): 1 INJECTION, POWDER, FOR SOLUTION INTRAMUSCULAR; INTRAVENOUS at 13:33

## 2019-10-27 RX ADMIN — Medication 5 MILLILITER(S): at 08:20

## 2019-10-27 RX ADMIN — TACROLIMUS 1.5 MILLIGRAM(S): 5 CAPSULE ORAL at 18:20

## 2019-10-27 RX ADMIN — MAGNESIUM OXIDE 400 MG ORAL TABLET 400 MILLIGRAM(S): 241.3 TABLET ORAL at 18:21

## 2019-10-27 RX ADMIN — CEFEPIME 100 MILLIGRAM(S): 1 INJECTION, POWDER, FOR SOLUTION INTRAMUSCULAR; INTRAVENOUS at 20:58

## 2019-10-27 RX ADMIN — Medication 10 MILLILITER(S): at 16:41

## 2019-10-27 RX ADMIN — Medication 1 LOZENGE: at 08:20

## 2019-10-27 RX ADMIN — MAGNESIUM OXIDE 400 MG ORAL TABLET 400 MILLIGRAM(S): 241.3 TABLET ORAL at 13:32

## 2019-10-27 RX ADMIN — Medication 12.5 MILLIGRAM(S): at 05:29

## 2019-10-27 RX ADMIN — CHLORHEXIDINE GLUCONATE 1 APPLICATION(S): 213 SOLUTION TOPICAL at 08:19

## 2019-10-27 RX ADMIN — Medication 1 LOZENGE: at 20:29

## 2019-10-27 RX ADMIN — Medication 1 MILLIGRAM(S): at 13:35

## 2019-10-27 RX ADMIN — MYCOPHENOLATE MOFETIL 1000 MILLIGRAM(S): 250 CAPSULE ORAL at 08:21

## 2019-10-27 RX ADMIN — Medication 400 MILLIGRAM(S): at 05:28

## 2019-10-27 RX ADMIN — Medication 1 TABLET(S): at 13:35

## 2019-10-27 RX ADMIN — Medication 5 MILLILITER(S): at 20:29

## 2019-10-27 RX ADMIN — Medication 50 GRAM(S): at 10:20

## 2019-10-27 RX ADMIN — TACROLIMUS 1.5 MILLIGRAM(S): 5 CAPSULE ORAL at 05:30

## 2019-10-27 RX ADMIN — URSODIOL 300 MILLIGRAM(S): 250 TABLET, FILM COATED ORAL at 18:20

## 2019-10-27 RX ADMIN — Medication 1 LOZENGE: at 16:40

## 2019-10-27 RX ADMIN — Medication 5 MILLILITER(S): at 13:32

## 2019-10-27 RX ADMIN — Medication 2.5 MILLIGRAM(S): at 05:28

## 2019-10-27 RX ADMIN — MAGNESIUM OXIDE 400 MG ORAL TABLET 400 MILLIGRAM(S): 241.3 TABLET ORAL at 08:21

## 2019-10-27 RX ADMIN — Medication 650 MILLIGRAM(S): at 21:00

## 2019-10-27 RX ADMIN — Medication 10 MILLILITER(S): at 08:18

## 2019-10-27 RX ADMIN — Medication 650 MILLIGRAM(S): at 21:30

## 2019-10-27 RX ADMIN — SERTRALINE 50 MILLIGRAM(S): 25 TABLET, FILM COATED ORAL at 20:55

## 2019-10-27 RX ADMIN — Medication 1 LOZENGE: at 13:32

## 2019-10-27 RX ADMIN — Medication 10 MILLILITER(S): at 13:38

## 2019-10-27 RX ADMIN — MYCOPHENOLATE MOFETIL 1000 MILLIGRAM(S): 250 CAPSULE ORAL at 13:34

## 2019-10-27 NOTE — PROGRESS NOTE ADULT - ASSESSMENT
59 year old male, with previously diagnosed acute myeloid leukemia. s/p induction chemotherapy with Daunorubicin/Cytarabine and HIDAC consolidation chemotherapy, now admitted for Haplo-identical stem cell transplant from son:  Cardiac MRI in 9/2019 demonstrated LVEF 24%, with Mid wall myocardial late gadolinium enhancement at the base involving the anteroseptal and inferoseptal wall.    - No evidence of volume overload at present.  He is on fluids and has been getting IV Lasix prn to maintain net negative.  His recent CXR is clear.  - Continue to maintain strict I's and O's, and to monitor for signs of volume overload, which he is at risk for. He was 1.6 L negative over last 24 hours  - No evidence of acute ischemia  - BP is overall stable  - Continue ACEI and BB at current doses as tolerated by BP.    - agree with PRBC transfusion to keep Hb>7. Can give him IV Lasix post transfusion if needed  - On heparin gtt for hx of dvt/pe and hypercoagulable state, and adjust rate per protocol. Monitor platelet count, now >20. No bleeding, though is clearly at risk.  - Monitor and replete lytes  - To follow while admitted

## 2019-10-27 NOTE — PROGRESS NOTE ADULT - ATTENDING COMMENTS
59 year old male, with previously diagnosed acute myeloid leukemia. s/p induction chemotherapy with Daunorubicin/Cytarabine and HIDAC consolidation chemotherapy, now admitted for Haplo-identical stem cell transplant from son. Conditioning regimen of Fludarabine, Cytoxan, and TBI. Other history includes non-ischemic cardiomyopathy (recent EF 25% 9/19/19), COLLEEN, DVT/PE, HTN, Factor V Leiden and amputation of right toe due to osteomyelitis.  Day + 17, s/p haploPBSCT, early engraftment  Started  Zarxio on day +5,  MMF and Tacrolimus. Check Tacrolimus levels on Monday and Thursday.   Prior fevers likely due to haplostorm,  s/p CTX 2/2.  Follow temps if persist can dose 1 mg/kg of solumedrol.  Blood/urine cultures from 10/10 and 10/12 negative.  Continue cefepime/voriconazole for now along with acyclovir prophylaxis.   Strict I&O, daily weights, prn diuresis   Renal insufficiency- monitor daily creatinine, normal today.   Anxiolytics, antinausea, antidiarrhea medications as needed.  Nutritional support.  Hx non-ischemic CM, followed by Dr. Luke Lyn, last Echo 9/19 (EF 25%).  Monitor for fluid overload.  VOD prophylaxis - low dose heparin gtt (dosed at 100 units / kg / day), glutamine supplementation, Actigall BID   GI prophylaxis - Protonix po QD.  Electrolyte support  Aggressive mouth care and skin care as per protocol.  OOB/ambulate 59 year old male, with previously diagnosed acute myeloid leukemia. s/p induction chemotherapy with Daunorubicin/Cytarabine and HIDAC consolidation chemotherapy, now admitted for Haplo-identical stem cell transplant from son. Conditioning regimen of Fludarabine, Cytoxan, and TBI. Other history includes non-ischemic cardiomyopathy (recent EF 25% 9/19/19), COLLEEN, DVT/PE, HTN, Factor V Leiden and amputation of right toe due to osteomyelitis.  Day + 18, s/p haploPBSCT, early engraftment  Started  Zarxio on day +5,  MMF and Tacrolimus. Check Tacrolimus levels on Monday and Thursday.   Prior fevers likely due to haplostorm,  s/p CTX 2/2.  Follow temps if persist can dose 1 mg/kg of solumedrol.  Blood/urine cultures from 10/10 and 10/12 negative.  Continue cefepime/voriconazole for now along with acyclovir prophylaxis.   Strict I&O, daily weights, prn diuresis   Renal insufficiency- monitor daily creatinine, normal today.   Anxiolytics, antinausea, antidiarrhea medications as needed.  Nutritional support.  Hx non-ischemic CM, followed by Dr. Luke Lyn, last Echo 9/19 (EF 25%).  Monitor for fluid overload.  VOD prophylaxis - low dose heparin gtt (dosed at 100 units / kg / day), glutamine supplementation, Actigall BID   GI prophylaxis - Protonix po QD.  Electrolyte support  Aggressive mouth care and skin care as per protocol.  OOB/ambulate

## 2019-10-27 NOTE — PROGRESS NOTE ADULT - SUBJECTIVE AND OBJECTIVE BOX
[facility] - Ede Zamudio, Riki, Edgar, Gem, Tabitha Garcia  Office Number:  [phone]    Patient resting comfortably in bed in NAD.  Laying flat with no respiratory distress.  No complaints of chest pain, dyspnea, palpitations, PND, or orthopnea.    ROS: negative unless otherwise mentioned.    Telemetry:  off    MEDICATIONS  (STANDING):  acetaminophen   Tablet .. 650 milliGRAM(s) Oral every 6 hours  acetaminophen   Tablet .. 650 milliGRAM(s) Oral <User Schedule>  acyclovir   Oral Tab/Cap 400 milliGRAM(s) Oral every 8 hours  ALPRAZolam 1 milliGRAM(s) Oral at bedtime  Biotene Dry Mouth Oral Rinse 5 milliLiter(s) Swish and Spit five times a day  cefepime   IVPB 2000 milliGRAM(s) IV Intermittent every 8 hours  chlorhexidine 4% Liquid 1 Application(s) Topical <User Schedule>  clotrimazole Lozenge 1 Lozenge Oral five times a day  diphenhydrAMINE   Injectable 25 milliGRAM(s) IV Push <User Schedule>  docusate sodium 100 milliGRAM(s) Oral three times a day  enalapril 2.5 milliGRAM(s) Oral daily  filgrastim-sndz Injectable 480 MICROGram(s) SubCutaneous daily  finasteride 5 milliGRAM(s) Oral daily  folic acid 1 milliGRAM(s) Oral daily  heparin  Infusion 339 Unit(s)/Hr (3.39 mL/Hr) IV Continuous <Continuous>  immune   globulin 10% (GAMMAGARD) IVPB 30 Gram(s) IV Intermittent <User Schedule>  lidocaine/prilocaine Cream 1 Application(s) Topical daily  magnesium oxide 400 milliGRAM(s) Oral three times a day with meals  metoprolol succinate ER 12.5 milliGRAM(s) Oral two times a day  multivitamin 1 Tablet(s) Oral daily  mycophenolate mofetil 1000 milliGRAM(s) Oral <User Schedule>  pantoprazole    Tablet 40 milliGRAM(s) Oral before breakfast  senna 2 Tablet(s) Oral at bedtime  sertraline 50 milliGRAM(s) Oral at bedtime  sodium bicarbonate Mouth Rinse 10 milliLiter(s) Swish and Spit five times a day  sodium chloride 0.9%. 1000 milliLiter(s) (20 mL/Hr) IV Continuous <Continuous>  tacrolimus 1.5 milliGRAM(s) Oral two times a day  tamsulosin 0.4 milliGRAM(s) Oral at bedtime  ursodiol Capsule 300 milliGRAM(s) Oral two times a day with meals  voriconazole 200 milliGRAM(s) Oral every 12 hours    MEDICATIONS  (PRN):  acetaminophen   Tablet .. 650 milliGRAM(s) Oral every 6 hours PRN Temp greater or equal to 38C (100.4F), Mild Pain (1 - 3)  acetaminophen  IVPB .. 1000 milliGRAM(s) IV Intermittent once PRN Temp greater or equal to 38.5C (101.3F)  diphenhydrAMINE   Injectable 25 milliGRAM(s) IV Push every 4 hours PRN Allergy symptoms  metoclopramide Injectable 10 milliGRAM(s) IV Push every 6 hours PRN Nausea and/or Vomiting  ondansetron Injectable 8 milliGRAM(s) IV Push every 8 hours PRN Nausea and/or Vomiting  sodium chloride 0.9% lock flush 10 milliLiter(s) IV Push every 1 hour PRN Pre/post blood products, medications, blood draw, and to maintain line patency      Allergies    No Known Allergies    Intolerances        Vital Signs Last 24 Hrs  T(C): 37.2 (27 Oct 2019 09:20), Max: 37.2 (27 Oct 2019 09:20)  T(F): 99 (27 Oct 2019 09:20), Max: 99 (27 Oct 2019 09:20)  HR: 89 (27 Oct 2019 09:20) (82 - 92)  BP: 115/73 (27 Oct 2019 09:20) (100/64 - 129/77)  BP(mean): --  RR: 18 (27 Oct 2019 09:20) (18 - 18)  SpO2: 93% (27 Oct 2019 09:20) (93% - 98%)    I&O's Summary    26 Oct 2019 07:01  -  27 Oct 2019 07:00  --------------------------------------------------------  IN: 1517 mL / OUT: 3190 mL / NET: -1673 mL    27 Oct 2019 07:01  -  27 Oct 2019 11:35  --------------------------------------------------------  IN: 316 mL / OUT: 0 mL / NET: 316 mL        ON EXAM:    Constitutional: well-nourished, well-developed, NAD   HEENT:  MMM, sclerae anicteric, conjunctivae clear, no oral cyanosis.  Pulmonary: Non-labored, breath sounds are clear bilaterally, No wheezing, rales or rhonchi  Cardiovascular: Regular, S1 and S2.  No murmur.  No rubs, gallops or clicks  Gastrointestinal: Bowel Sounds present, soft, nontender.   Lymph: No peripheral edema.   Neurological: Alert, no focal deficits  Skin: No rashes.  Psych:  Mood & affect appropriate    LABS: All Labs Reviewed:                        7.6    1.77  )-----------( 26       ( 27 Oct 2019 07:12 )             22.7                         6.5    0.83  )-----------( 36       ( 26 Oct 2019 06:38 )             20.0                         7.2    0.32  )-----------( 6        ( 25 Oct 2019 07:10 )             21.0     27 Oct 2019 07:09    144    |  102    |  16     ----------------------------<  98     4.5     |  24     |  1.04   26 Oct 2019 06:50    142    |  105    |  15     ----------------------------<  92     4.0     |  25     |  0.94   25 Oct 2019 07:10    140    |  106    |  13     ----------------------------<  104    4.7     |  25     |  0.86     Ca    8.8        27 Oct 2019 07:09  Ca    9.4        26 Oct 2019 06:50  Ca    9.3        25 Oct 2019 07:10  Phos  4.3       27 Oct 2019 07:09  Phos  4.1       26 Oct 2019 06:50  Phos  4.0       25 Oct 2019 07:10  Mg     1.4       27 Oct 2019 07:09  Mg     1.6       26 Oct 2019 06:50  Mg     1.6       25 Oct 2019 07:10    TPro  5.6    /  Alb  3.5    /  TBili  0.3    /  DBili  x      /  AST  8      /  ALT  10     /  AlkPhos  72     27 Oct 2019 07:09  TPro  5.5    /  Alb  3.4    /  TBili  0.3    /  DBili  x      /  AST  9      /  ALT  9      /  AlkPhos  68     26 Oct 2019 06:50  TPro  5.4    /  Alb  3.2    /  TBili  0.3    /  DBili  <0.1   /  AST  9      /  ALT  7      /  AlkPhos  68     25 Oct 2019 07:10          Blood Culture:

## 2019-10-27 NOTE — PROGRESS NOTE ADULT - SUBJECTIVE AND OBJECTIVE BOX
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    + infrequent cough, gum discomfort     Denies tongue, throat pain, dyspnea, nausea, vomiting, diarrhea, abdominal pain                                                                                                                                                           O: Vitals:   Vital Signs Last 24 Hrs  T(C): 36.5 (27 Oct 2019 05:39), Max: 37 (26 Oct 2019 09:30)  T(F): 97.7 (27 Oct 2019 05:39), Max: 98.6 (26 Oct 2019 09:30)  HR: 83 (27 Oct 2019 05:39) (82 - 92)  BP: 123/76 (27 Oct 2019 05:39) (100/64 - 129/77)  BP(mean): --  RR: 18 (27 Oct 2019 05:39) (18 - 18)  SpO2: 93% (27 Oct 2019 05:39) (93% - 98%)    Admit weight:   Daily     Daily Weight in k.5 (26 Oct 2019 09:41)    Intake / Output:   10-26 @ 07:01  -  10-27 @ 07:00  --------------------------------------------------------  IN: 1517 mL / OUT: 3190 mL / NET: -1673 mL          PE:    Oropharynx: no ulceration or erythema  Oral Mucositis:   -                                             Grade: -  CVS: RRR, normal S1 S2  Lungs: CTA bilaterally   Abdomen: soft, + normoactive BS, NT, ND  Extremities: warm, no edema in BLE . Toe amputation  Gastric Mucositis:     -                                             Grade: -  Intestinal Mucositis:      -                                        Grade: -  Skin: patchy erythematous rash back and upper chest resolving  TLC:  C/D/I  Neuro: Alert and oriented x 3  Pain: none      Labs:   CBC Full  -  ( 27 Oct 2019 07:12 )  WBC Count : x  Hemoglobin : 7.6 g/dL  Hematocrit : 22.7 %  Platelet Count - Automated : 26 K/uL  Mean Cell Volume : 87.0 fl  Mean Cell Hemoglobin : 29.1 pg  Mean Cell Hemoglobin Concentration : 33.5 gm/dL  Auto Neutrophil # : x  Auto Lymphocyte # : x  Auto Monocyte # : x  Auto Eosinophil # : x  Auto Basophil # : x  Auto Neutrophil % : x  Auto Lymphocyte % : x  Auto Monocyte % : x  Auto Eosinophil % : x  Auto Basophil % : x                          7.6    x     )-----------(        ( 27 Oct 2019 07:12 )             22.7     10-26    142  |  105  |  15  ----------------------------<  92  4.0   |  25  |  0.94    Ca    9.4      26 Oct 2019 06:50  Phos  4.1     10-26  Mg     1.6     10-26    TPro  5.5<L>  /  Alb  3.4  /  TBili  0.3  /  DBili  x   /  AST  9<L>  /  ALT  9<L>  /  AlkPhos  68  10-26      LIVER FUNCTIONS - ( 26 Oct 2019 06:50 )  Alb: 3.4 g/dL / Pro: 5.5 g/dL / ALK PHOS: 68 U/L / ALT: 9 U/L / AST: 9 U/L / GGT: x                   Karnofsky / Lansky Scale:   GVHD:   Skin:   Liver:   Gut:   Overall Grade:       Cultures:         Radiology:       Meds:   Antimicrobials:   acyclovir   Oral Tab/Cap 400 milliGRAM(s) Oral every 8 hours  cefepime   IVPB 2000 milliGRAM(s) IV Intermittent every 8 hours  clotrimazole Lozenge 1 Lozenge Oral five times a day  voriconazole 200 milliGRAM(s) Oral every 12 hours      Heme / Onc:   heparin  Infusion 339 Unit(s)/Hr IV Continuous <Continuous>      GI:  docusate sodium 100 milliGRAM(s) Oral three times a day  pantoprazole    Tablet 40 milliGRAM(s) Oral before breakfast  senna 2 Tablet(s) Oral at bedtime  sodium bicarbonate Mouth Rinse 10 milliLiter(s) Swish and Spit five times a day  ursodiol Capsule 300 milliGRAM(s) Oral two times a day with meals      Cardiovascular:   enalapril 2.5 milliGRAM(s) Oral daily  metoprolol succinate ER 12.5 milliGRAM(s) Oral two times a day  tamsulosin 0.4 milliGRAM(s) Oral at bedtime      Immunologic:   filgrastim-sndz Injectable 480 MICROGram(s) SubCutaneous daily  immune   globulin 10% (GAMMAGARD) IVPB 30 Gram(s) IV Intermittent <User Schedule>  mycophenolate mofetil 1000 milliGRAM(s) Oral <User Schedule>  tacrolimus 1.5 milliGRAM(s) Oral two times a day      Other medications:   acetaminophen   Tablet .. 650 milliGRAM(s) Oral every 6 hours  acetaminophen   Tablet .. 650 milliGRAM(s) Oral <User Schedule>  ALPRAZolam 1 milliGRAM(s) Oral at bedtime  Biotene Dry Mouth Oral Rinse 5 milliLiter(s) Swish and Spit five times a day  chlorhexidine 4% Liquid 1 Application(s) Topical <User Schedule>  diphenhydrAMINE   Injectable 25 milliGRAM(s) IV Push <User Schedule>  finasteride 5 milliGRAM(s) Oral daily  folic acid 1 milliGRAM(s) Oral daily  lidocaine/prilocaine Cream 1 Application(s) Topical daily  magnesium oxide 400 milliGRAM(s) Oral three times a day with meals  multivitamin 1 Tablet(s) Oral daily  sertraline 50 milliGRAM(s) Oral at bedtime  sodium chloride 0.9%. 1000 milliLiter(s) IV Continuous <Continuous>      PRN:   acetaminophen   Tablet .. 650 milliGRAM(s) Oral every 6 hours PRN  acetaminophen  IVPB .. 1000 milliGRAM(s) IV Intermittent once PRN  diphenhydrAMINE   Injectable 25 milliGRAM(s) IV Push every 4 hours PRN  metoclopramide Injectable 10 milliGRAM(s) IV Push every 6 hours PRN  ondansetron Injectable 8 milliGRAM(s) IV Push every 8 hours PRN  sodium chloride 0.9% lock flush 10 milliLiter(s) IV Push every 1 hour PRN      A/P:   59 year old male with a history of AML  Post Allogeneic PBSCT day +17  Strict I&O, daily weights, prn diuresis   Cardiology following   10/14- Started Tacro, Cellcept and Zarxio  Afebrile, neutropenic - continue Cefepime.  10/11- Patient will need split units of  PRBC if need to be transfused due to low EF.  10/23-  Platelets transfusion reaction yesterday, transfusion workup completed.    1. Infectious Disease:   acyclovir   Oral Tab/Cap 400 milliGRAM(s) Oral every 8 hours  cefepime   IVPB 2000 milliGRAM(s) IV Intermittent every 8 hours  clotrimazole Lozenge 1 Lozenge Oral five times a day  voriconazole 200 milliGRAM(s) Oral every 12 hours    2. VOD Prophylaxis: Actigall, Glutamine, Heparin (dosed at 100 units / kg / day)     3. GI Prophylaxis:    pantoprazole    Tablet 40 milliGRAM(s) Oral before breakfast    4. Mouthcare - NS / NaHCO3 rinses, Mycelex, Biotene; Skin care     5. GVHD prophylaxis   mycophenolate mofetil 1000 milliGRAM(s) Oral <User Schedule>  tacrolimus 1.5 milliGRAM(s) Oral two times a day  CTX days + 3, + 4     6. Transfuse & replete electrolytes prn   magnesium oxide 400 milliGRAM(s) Oral three times a day with meals  anemia - 1/2 PRBC x 2    7. IV hydration, daily weights, strict I&O, prn diuresis     8. PO intake as tolerated, nutrition follow up as needed, MVI, folic acid     9. Antiemetics, anti-diarrhea medications:   metoclopramide Injectable 10 milliGRAM(s) IV Push every 6 hours PRN  ondansetron Injectable 8 milliGRAM(s) IV Push every 8 hours PRN    10. OOB as tolerated, physical therapy consult if needed     11. Monitor coags / fibrinogen 2x week, vitamin K as needed     12. Monitor closely for clinical changes, monitor for fevers     13. Emotional support provided, plan of care discussed and questions addressed     14. Patient education done regarding chemotherapy prep, plan of care, restrictions and discharge planning     15. Continue regular social work input        I have written the above note for Dr. Quiñones who performed service with me in the room.   Brandon Hansen  NP-C ([phone])    I have seen and examined patient with NP, I agree with above note as scribed. 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    Denies tongue, throat pain, dyspnea, nausea, vomiting, diarrhea, abdominal pain                                                                                                                                                           O: Vitals:   Vital Signs Last 24 Hrs  T(C): 36.5 (27 Oct 2019 05:39), Max: 37 (26 Oct 2019 09:30)  T(F): 97.7 (27 Oct 2019 05:39), Max: 98.6 (26 Oct 2019 09:30)  HR: 83 (27 Oct 2019 05:39) (82 - 92)  BP: 123/76 (27 Oct 2019 05:39) (100/64 - 129/77)  BP(mean): --  RR: 18 (27 Oct 2019 05:39) (18 - 18)  SpO2: 93% (27 Oct 2019 05:39) (93% - 98%)    Admit weight:   Daily     Daily Weight in k.5 (26 Oct 2019 09:41)    Intake / Output:   10-26 @ 07:01  -  10-27 @ 07:00  --------------------------------------------------------  IN: 1517 mL / OUT: 3190 mL / NET: -1673 mL    PE:    Oropharynx: no ulceration or erythema  Oral Mucositis:   -                                             Grade: -  CVS: RRR, normal S1 S2  Lungs: CTA bilaterally   Abdomen: soft, + normoactive BS, NT, ND  Extremities: warm, no edema in BLE . Toe amputation  Gastric Mucositis:     -                                             Grade: -  Intestinal Mucositis:      -                                        Grade: -  Skin: patchy erythematous rash back and upper chest resolving  TLC:  C/D/I  Neuro: Alert and oriented x 3  Pain: none      Labs:   CBC Full  -  ( 27 Oct 2019 07:12 )  WBC Count : x  Hemoglobin : 7.6 g/dL  Hematocrit : 22.7 %  Platelet Count - Automated : 26 K/uL  Mean Cell Volume : 87.0 fl  Mean Cell Hemoglobin : 29.1 pg  Mean Cell Hemoglobin Concentration : 33.5 gm/dL  Auto Neutrophil # : x  Auto Lymphocyte # : x  Auto Monocyte # : x  Auto Eosinophil # : x  Auto Basophil # : x  Auto Neutrophil % : x  Auto Lymphocyte % : x  Auto Monocyte % : x  Auto Eosinophil % : x  Auto Basophil % : x                          7.6    x     )-----------(        ( 27 Oct 2019 07:12 )             22.7     10-26    142  |  105  |  15  ----------------------------<  92  4.0   |  25  |  0.94    Ca    9.4      26 Oct 2019 06:50  Phos  4.1     10-26  Mg     1.6     10-26    TPro  5.5<L>  /  Alb  3.4  /  TBili  0.3  /  DBili  x   /  AST  9<L>  /  ALT  9<L>  /  AlkPhos  68  10-26      LIVER FUNCTIONS - ( 26 Oct 2019 06:50 )  Alb: 3.4 g/dL / Pro: 5.5 g/dL / ALK PHOS: 68 U/L / ALT: 9 U/L / AST: 9 U/L / GGT: x             Cultures:         Radiology:       Meds:   Antimicrobials:   acyclovir   Oral Tab/Cap 400 milliGRAM(s) Oral every 8 hours  cefepime   IVPB 2000 milliGRAM(s) IV Intermittent every 8 hours  clotrimazole Lozenge 1 Lozenge Oral five times a day  voriconazole 200 milliGRAM(s) Oral every 12 hours      Heme / Onc:   heparin  Infusion 339 Unit(s)/Hr IV Continuous <Continuous>      GI:  docusate sodium 100 milliGRAM(s) Oral three times a day  pantoprazole    Tablet 40 milliGRAM(s) Oral before breakfast  senna 2 Tablet(s) Oral at bedtime  sodium bicarbonate Mouth Rinse 10 milliLiter(s) Swish and Spit five times a day  ursodiol Capsule 300 milliGRAM(s) Oral two times a day with meals      Cardiovascular:   enalapril 2.5 milliGRAM(s) Oral daily  metoprolol succinate ER 12.5 milliGRAM(s) Oral two times a day  tamsulosin 0.4 milliGRAM(s) Oral at bedtime      Immunologic:   filgrastim-sndz Injectable 480 MICROGram(s) SubCutaneous daily  immune   globulin 10% (GAMMAGARD) IVPB 30 Gram(s) IV Intermittent <User Schedule>  mycophenolate mofetil 1000 milliGRAM(s) Oral <User Schedule>  tacrolimus 1.5 milliGRAM(s) Oral two times a day      Other medications:   acetaminophen   Tablet .. 650 milliGRAM(s) Oral every 6 hours  acetaminophen   Tablet .. 650 milliGRAM(s) Oral <User Schedule>  ALPRAZolam 1 milliGRAM(s) Oral at bedtime  Biotene Dry Mouth Oral Rinse 5 milliLiter(s) Swish and Spit five times a day  chlorhexidine 4% Liquid 1 Application(s) Topical <User Schedule>  diphenhydrAMINE   Injectable 25 milliGRAM(s) IV Push <User Schedule>  finasteride 5 milliGRAM(s) Oral daily  folic acid 1 milliGRAM(s) Oral daily  lidocaine/prilocaine Cream 1 Application(s) Topical daily  magnesium oxide 400 milliGRAM(s) Oral three times a day with meals  multivitamin 1 Tablet(s) Oral daily  sertraline 50 milliGRAM(s) Oral at bedtime  sodium chloride 0.9%. 1000 milliLiter(s) IV Continuous <Continuous>      PRN:   acetaminophen   Tablet .. 650 milliGRAM(s) Oral every 6 hours PRN  acetaminophen  IVPB .. 1000 milliGRAM(s) IV Intermittent once PRN  diphenhydrAMINE   Injectable 25 milliGRAM(s) IV Push every 4 hours PRN  metoclopramide Injectable 10 milliGRAM(s) IV Push every 6 hours PRN  ondansetron Injectable 8 milliGRAM(s) IV Push every 8 hours PRN  sodium chloride 0.9% lock flush 10 milliLiter(s) IV Push every 1 hour PRN      A/P:   59 year old male with a history of AML  Post Allogeneic PBSCT day +17  Strict I&O, daily weights, prn diuresis   Cardiology following   10/14- Started Tacro, Cellcept and Zarxio  Afebrile, neutropenic - continue Cefepime.  10/11- Patient will need split units of  PRBC if need to be transfused due to low EF.  10/23-  Platelets transfusion reaction yesterday, transfusion workup completed.    1. Infectious Disease:   acyclovir   Oral Tab/Cap 400 milliGRAM(s) Oral every 8 hours  cefepime   IVPB 2000 milliGRAM(s) IV Intermittent every 8 hours  clotrimazole Lozenge 1 Lozenge Oral five times a day  voriconazole 200 milliGRAM(s) Oral every 12 hours    2. VOD Prophylaxis: Actigall, Glutamine, Heparin (dosed at 100 units / kg / day)     3. GI Prophylaxis:    pantoprazole    Tablet 40 milliGRAM(s) Oral before breakfast    4. Mouthcare - NS / NaHCO3 rinses, Mycelex, Biotene; Skin care     5. GVHD prophylaxis   mycophenolate mofetil 1000 milliGRAM(s) Oral <User Schedule>  tacrolimus 1.5 milliGRAM(s) Oral two times a day  CTX days + 3, + 4     6. Transfuse & replete electrolytes prn   magnesium oxide 400 milliGRAM(s) Oral three times a day with meals  anemia - 1/2 PRBC x 2    7. IV hydration, daily weights, strict I&O, prn diuresis     8. PO intake as tolerated, nutrition follow up as needed, MVI, folic acid     9. Antiemetics, anti-diarrhea medications:   metoclopramide Injectable 10 milliGRAM(s) IV Push every 6 hours PRN  ondansetron Injectable 8 milliGRAM(s) IV Push every 8 hours PRN    10. OOB as tolerated, physical therapy consult if needed     11. Monitor coags / fibrinogen 2x week, vitamin K as needed     12. Monitor closely for clinical changes, monitor for fevers     13. Emotional support provided, plan of care discussed and questions addressed     14. Patient education done regarding chemotherapy prep, plan of care, restrictions and discharge planning     15. Continue regular social work input        I have written the above note for Dr. Quiñones who performed service with me in the room.   Brandon Hansen  NP-C ([phone])    I have seen and examined patient with NP, I agree with above note as scribed. 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    Denies tongue, throat pain, dyspnea, nausea, vomiting, diarrhea, abdominal pain                                                                                                                                                           O: Vitals:   Vital Signs Last 24 Hrs  T(C): 36.5 (27 Oct 2019 05:39), Max: 37 (26 Oct 2019 09:30)  T(F): 97.7 (27 Oct 2019 05:39), Max: 98.6 (26 Oct 2019 09:30)  HR: 83 (27 Oct 2019 05:39) (82 - 92)  BP: 123/76 (27 Oct 2019 05:39) (100/64 - 129/77)  BP(mean): --  RR: 18 (27 Oct 2019 05:39) (18 - 18)  SpO2: 93% (27 Oct 2019 05:39) (93% - 98%)    Admit weight: 81.5 kg  Daily weight: 78.5 kg    Intake / Output:   10-26 @ 07:01  -  10-27 @ 07:00  --------------------------------------------------------  IN: 1517 mL / OUT: 3190 mL / NET: -1673 mL    PE:    Oropharynx: no ulceration or erythema  Oral Mucositis:   -                                             Grade: -  CVS: RRR, normal S1 S2  Lungs: CTA bilaterally   Abdomen: soft, + normoactive BS, NT, ND  Extremities: warm, no edema in BLE . Toe amputation  Gastric Mucositis:     -                                             Grade: -  Intestinal Mucositis:      -                                        Grade: -  Skin: patchy erythematous rash back and upper chest resolving  TLC:  C/D/I  Neuro: Alert and oriented x 3  Pain: none      Labs:   CBC Full  -  ( 27 Oct 2019 07:12 )  WBC Count : x  Hemoglobin : 7.6 g/dL  Hematocrit : 22.7 %  Platelet Count - Automated : 26 K/uL  Mean Cell Volume : 87.0 fl  Mean Cell Hemoglobin : 29.1 pg  Mean Cell Hemoglobin Concentration : 33.5 gm/dL  Auto Neutrophil # : x  Auto Lymphocyte # : x  Auto Monocyte # : x  Auto Eosinophil # : x  Auto Basophil # : x  Auto Neutrophil % : x  Auto Lymphocyte % : x  Auto Monocyte % : x  Auto Eosinophil % : x  Auto Basophil % : x                          7.6    x     )-----------( 26       ( 27 Oct 2019 07:12 )             22.7     10-26    142  |  105  |  15  ----------------------------<  92  4.0   |  25  |  0.94    Ca    9.4      26 Oct 2019 06:50  Phos  4.1     10-26  Mg     1.6     10-26    TPro  5.5<L>  /  Alb  3.4  /  TBili  0.3  /  DBili  x   /  AST  9<L>  /  ALT  9<L>  /  AlkPhos  68  10-26      LIVER FUNCTIONS - ( 26 Oct 2019 06:50 )  Alb: 3.4 g/dL / Pro: 5.5 g/dL / ALK PHOS: 68 U/L / ALT: 9 U/L / AST: 9 U/L / GGT: x             Cultures:   Culture - Urine (10.13.19 @ 04:27)    Specimen Source: .Urine    Culture Results:   No growth    Culture - Blood (10.12.19 @ 22:28)    Specimen Source: .Blood    Culture Results:   No growth at 5 days.    Radiology:    Xray Chest 1 View- PORTABLE-Routine (10.12.19 @ 09:00) >  Clear lungs.    Meds:   Antimicrobials:   acyclovir   Oral Tab/Cap 400 milliGRAM(s) Oral every 8 hours  cefepime   IVPB 2000 milliGRAM(s) IV Intermittent every 8 hours  clotrimazole Lozenge 1 Lozenge Oral five times a day  voriconazole 200 milliGRAM(s) Oral every 12 hours    Heme / Onc:   heparin  Infusion 339 Unit(s)/Hr IV Continuous <Continuous>    GI:  docusate sodium 100 milliGRAM(s) Oral three times a day  pantoprazole    Tablet 40 milliGRAM(s) Oral before breakfast  senna 2 Tablet(s) Oral at bedtime  sodium bicarbonate Mouth Rinse 10 milliLiter(s) Swish and Spit five times a day  ursodiol Capsule 300 milliGRAM(s) Oral two times a day with meals    Cardiovascular:   enalapril 2.5 milliGRAM(s) Oral daily  metoprolol succinate ER 12.5 milliGRAM(s) Oral two times a day  tamsulosin 0.4 milliGRAM(s) Oral at bedtime    Immunologic:   filgrastim-sndz Injectable 480 MICROGram(s) SubCutaneous daily  immune   globulin 10% (GAMMAGARD) IVPB 30 Gram(s) IV Intermittent <User Schedule>  mycophenolate mofetil 1000 milliGRAM(s) Oral <User Schedule>  tacrolimus 1.5 milliGRAM(s) Oral two times a day    Other medications:   acetaminophen   Tablet .. 650 milliGRAM(s) Oral every 6 hours  acetaminophen   Tablet .. 650 milliGRAM(s) Oral <User Schedule>  ALPRAZolam 1 milliGRAM(s) Oral at bedtime  Biotene Dry Mouth Oral Rinse 5 milliLiter(s) Swish and Spit five times a day  chlorhexidine 4% Liquid 1 Application(s) Topical <User Schedule>  diphenhydrAMINE   Injectable 25 milliGRAM(s) IV Push <User Schedule>  finasteride 5 milliGRAM(s) Oral daily  folic acid 1 milliGRAM(s) Oral daily  lidocaine/prilocaine Cream 1 Application(s) Topical daily  magnesium oxide 400 milliGRAM(s) Oral three times a day with meals  multivitamin 1 Tablet(s) Oral daily  sertraline 50 milliGRAM(s) Oral at bedtime  sodium chloride 0.9%. 1000 milliLiter(s) IV Continuous <Continuous>    PRN:   acetaminophen   Tablet .. 650 milliGRAM(s) Oral every 6 hours PRN  acetaminophen  IVPB .. 1000 milliGRAM(s) IV Intermittent once PRN  diphenhydrAMINE   Injectable 25 milliGRAM(s) IV Push every 4 hours PRN  metoclopramide Injectable 10 milliGRAM(s) IV Push every 6 hours PRN  ondansetron Injectable 8 milliGRAM(s) IV Push every 8 hours PRN  sodium chloride 0.9% lock flush 10 milliLiter(s) IV Push every 1 hour PRN    A/P:   59 year old male with a history of AML  Post Allogeneic PBSCT day +18  Strict I&O, daily weights, prn diuresis   Cardiology following   10/14- Started Tacro, Cellcept and Zarxio  Afebrile, neutropenic - continue Cefepime.  10/11- Patient will need split units of  PRBC if need to be transfused due to low EF.  10/23-  Platelets transfusion reaction yesterday, transfusion workup completed.  10/27- [place].    1. Infectious Disease:   acyclovir   Oral Tab/Cap 400 milliGRAM(s) Oral every 8 hours  cefepime   IVPB 2000 milliGRAM(s) IV Intermittent every 8 hours  clotrimazole Lozenge 1 Lozenge Oral five times a day  voriconazole 200 milliGRAM(s) Oral every 12 hours    2. VOD Prophylaxis: Actigall, Glutamine, Heparin (dosed at 100 units / kg / day)     3. GI Prophylaxis:    pantoprazole    Tablet 40 milliGRAM(s) Oral before breakfast    4. Mouth care - NS / NaHCO3 rinses, Mycelex, Biotene; Skin care     5. GVHD prophylaxis   mycophenolate mofetil 1000 milliGRAM(s) Oral <User Schedule>  tacrolimus 1.5 milliGRAM(s) Oral two times a day  CTX days + 3, + 4     6. Transfuse & replete electrolytes prn   magnesium oxide 400 milliGRAM(s) Oral three times a day with meals    7. IV hydration, daily weights, strict I&O, prn diuresis     8. PO intake as tolerated, nutrition follow up as needed, MVI, folic acid     9. Antiemetics, anti-diarrhea medications:   metoclopramide Injectable 10 milliGRAM(s) IV Push every 6 hours PRN  ondansetron Injectable 8 milliGRAM(s) IV Push every 8 hours PRN    10. OOB as tolerated, physical therapy consult if needed     11. Monitor coags / fibrinogen 2x week, vitamin K as needed     12. Monitor closely for clinical changes, monitor for fevers     13. Emotional support provided, plan of care discussed and questions addressed     14. Patient education done regarding chemotherapy prep, plan of care, restrictions and discharge planning     15. Continue regular social work input      I have written the above note for Dr. Goldberg who performed service with me in the room.   Brandon Hansen NP-C ([phone])    I have seen and examined patient with NP, I agree with above note as scribed. 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    Denies tongue, throat pain, dyspnea, nausea, vomiting, diarrhea, abdominal pain                                                                                                                                                           O: Vitals:   Vital Signs Last 24 Hrs  T(C): 36.5 (27 Oct 2019 05:39), Max: 37 (26 Oct 2019 09:30)  T(F): 97.7 (27 Oct 2019 05:39), Max: 98.6 (26 Oct 2019 09:30)  HR: 83 (27 Oct 2019 05:39) (82 - 92)  BP: 123/76 (27 Oct 2019 05:39) (100/64 - 129/77)  BP(mean): --  RR: 18 (27 Oct 2019 05:39) (18 - 18)  SpO2: 93% (27 Oct 2019 05:39) (93% - 98%)    Admit weight: 81.5 kg  Daily weight: 78.5 kg    Intake / Output:   10-26 @ 07:01  -  10-27 @ 07:00  --------------------------------------------------------  IN: 1517 mL / OUT: 3190 mL / NET: -1673 mL    PE:    Oropharynx: no ulceration or erythema  Oral Mucositis:   -                                             Grade: -  CVS: RRR, normal S1 S2  Lungs: CTA bilaterally   Abdomen: soft, + normoactive BS, NT, ND  Extremities: warm, no edema in BLE . Toe amputation  Gastric Mucositis:     -                                             Grade: -  Intestinal Mucositis:      -                                        Grade: -  Skin: patchy erythematous rash back and upper chest resolving  TLC:  C/D/I  Neuro: Alert and oriented x 3  Pain: none      Labs:   CBC Full  -  ( 27 Oct 2019 07:12 )  WBC Count : x  Hemoglobin : 7.6 g/dL  Hematocrit : 22.7 %  Platelet Count - Automated : 26 K/uL  Mean Cell Volume : 87.0 fl  Mean Cell Hemoglobin : 29.1 pg  Mean Cell Hemoglobin Concentration : 33.5 gm/dL  Auto Neutrophil # : x  Auto Lymphocyte # : x  Auto Monocyte # : x  Auto Eosinophil # : x  Auto Basophil # : x  Auto Neutrophil % : x  Auto Lymphocyte % : x  Auto Monocyte % : x  Auto Eosinophil % : x  Auto Basophil % : x                          7.6    x     )-----------( 26       ( 27 Oct 2019 07:12 )             22.7     10-26    142  |  105  |  15  ----------------------------<  92  4.0   |  25  |  0.94    Ca    9.4      26 Oct 2019 06:50  Phos  4.1     10-26  Mg     1.6     10-26    TPro  5.5<L>  /  Alb  3.4  /  TBili  0.3  /  DBili  x   /  AST  9<L>  /  ALT  9<L>  /  AlkPhos  68  10-26      LIVER FUNCTIONS - ( 26 Oct 2019 06:50 )  Alb: 3.4 g/dL / Pro: 5.5 g/dL / ALK PHOS: 68 U/L / ALT: 9 U/L / AST: 9 U/L / GGT: x             Cultures:   Culture - Urine (10.13.19 @ 04:27)    Specimen Source: .Urine    Culture Results:   No growth    Culture - Blood (10.12.19 @ 22:28)    Specimen Source: .Blood    Culture Results:   No growth at 5 days.    Radiology:    Xray Chest 1 View- PORTABLE-Routine (10.12.19 @ 09:00) >  Clear lungs.    Meds:   Antimicrobials:   acyclovir   Oral Tab/Cap 400 milliGRAM(s) Oral every 8 hours  cefepime   IVPB 2000 milliGRAM(s) IV Intermittent every 8 hours  clotrimazole Lozenge 1 Lozenge Oral five times a day  voriconazole 200 milliGRAM(s) Oral every 12 hours    Heme / Onc:   heparin  Infusion 339 Unit(s)/Hr IV Continuous <Continuous>    GI:  docusate sodium 100 milliGRAM(s) Oral three times a day  pantoprazole    Tablet 40 milliGRAM(s) Oral before breakfast  senna 2 Tablet(s) Oral at bedtime  sodium bicarbonate Mouth Rinse 10 milliLiter(s) Swish and Spit five times a day  ursodiol Capsule 300 milliGRAM(s) Oral two times a day with meals    Cardiovascular:   enalapril 2.5 milliGRAM(s) Oral daily  metoprolol succinate ER 12.5 milliGRAM(s) Oral two times a day  tamsulosin 0.4 milliGRAM(s) Oral at bedtime    Immunologic:   filgrastim-sndz Injectable 480 MICROGram(s) SubCutaneous daily  immune   globulin 10% (GAMMAGARD) IVPB 30 Gram(s) IV Intermittent <User Schedule>  mycophenolate mofetil 1000 milliGRAM(s) Oral <User Schedule>  tacrolimus 1.5 milliGRAM(s) Oral two times a day    Other medications:   acetaminophen   Tablet .. 650 milliGRAM(s) Oral every 6 hours  acetaminophen   Tablet .. 650 milliGRAM(s) Oral <User Schedule>  ALPRAZolam 1 milliGRAM(s) Oral at bedtime  Biotene Dry Mouth Oral Rinse 5 milliLiter(s) Swish and Spit five times a day  chlorhexidine 4% Liquid 1 Application(s) Topical <User Schedule>  diphenhydrAMINE   Injectable 25 milliGRAM(s) IV Push <User Schedule>  finasteride 5 milliGRAM(s) Oral daily  folic acid 1 milliGRAM(s) Oral daily  lidocaine/prilocaine Cream 1 Application(s) Topical daily  magnesium oxide 400 milliGRAM(s) Oral three times a day with meals  multivitamin 1 Tablet(s) Oral daily  sertraline 50 milliGRAM(s) Oral at bedtime  sodium chloride 0.9%. 1000 milliLiter(s) IV Continuous <Continuous>    PRN:   acetaminophen   Tablet .. 650 milliGRAM(s) Oral every 6 hours PRN  acetaminophen  IVPB .. 1000 milliGRAM(s) IV Intermittent once PRN  diphenhydrAMINE   Injectable 25 milliGRAM(s) IV Push every 4 hours PRN  metoclopramide Injectable 10 milliGRAM(s) IV Push every 6 hours PRN  ondansetron Injectable 8 milliGRAM(s) IV Push every 8 hours PRN  sodium chloride 0.9% lock flush 10 milliLiter(s) IV Push every 1 hour PRN    A/P:   59 year old male with a history of AML  Post Allogeneic PBSCT day +18  Strict I&O, daily weights, prn diuresis   Cardiology following   10/14- Started Tacro, Cellcept and Zarxio  Afebrile, neutropenic - continue Cefepime.  10/11- Patient will need split units of  PRBC if need to be transfused due to low EF.  10/23-  Platelets transfusion reaction yesterday, transfusion workup completed.  10/27- [place].    1. Infectious Disease:   acyclovir   Oral Tab/Cap 400 milliGRAM(s) Oral every 8 hours  cefepime   IVPB 2000 milliGRAM(s) IV Intermittent every 8 hours  clotrimazole Lozenge 1 Lozenge Oral five times a day  voriconazole 200 milliGRAM(s) Oral every 12 hours    2. VOD Prophylaxis: Actigall, Glutamine, Heparin (dosed at 100 units / kg / day)     3. GI Prophylaxis:    pantoprazole    Tablet 40 milliGRAM(s) Oral before breakfast    4. Mouth care - NS / NaHCO3 rinses, Mycelex, Biotene; Skin care     5. GVHD prophylaxis   mycophenolate mofetil 1000 milliGRAM(s) Oral <User Schedule>  tacrolimus 1.5 milliGRAM(s) Oral two times a day  CTX days + 3, + 4     6. Transfuse & replete electrolytes prn   magnesium oxide 400 milliGRAM(s) Oral three times a day with meals  Hypomagnesemia- Supplement magnesium.    7. IV hydration, daily weights, strict I&O, prn diuresis     8. PO intake as tolerated, nutrition follow up as needed, MVI, folic acid     9. Antiemetics, anti-diarrhea medications:   metoclopramide Injectable 10 milliGRAM(s) IV Push every 6 hours PRN  ondansetron Injectable 8 milliGRAM(s) IV Push every 8 hours PRN    10. OOB as tolerated, physical therapy consult if needed     11. Monitor coags / fibrinogen 2x week, vitamin K as needed     12. Monitor closely for clinical changes, monitor for fevers     13. Emotional support provided, plan of care discussed and questions addressed     14. Patient education done regarding chemotherapy prep, plan of care, restrictions and discharge planning     15. Continue regular social work input      I have written the above note for Dr. Goldberg who performed service with me in the room.   Brandon Hansen NP-C ([phone])    I have seen and examined patient with NP, I agree with above note as scribed.

## 2019-10-28 LAB
ALBUMIN SERPL ELPH-MCNC: 3.4 G/DL — SIGNIFICANT CHANGE UP (ref 3.3–5)
ALP SERPL-CCNC: 76 U/L — SIGNIFICANT CHANGE UP (ref 40–120)
ALT FLD-CCNC: 6 U/L — LOW (ref 10–45)
ANION GAP SERPL CALC-SCNC: 12 MMOL/L — SIGNIFICANT CHANGE UP (ref 5–17)
APTT BLD: 29 SEC — SIGNIFICANT CHANGE UP (ref 27.5–36.3)
AST SERPL-CCNC: 6 U/L — LOW (ref 10–40)
BASOPHILS # BLD AUTO: 0 K/UL — SIGNIFICANT CHANGE UP (ref 0–0.2)
BASOPHILS NFR BLD AUTO: 0 % — SIGNIFICANT CHANGE UP (ref 0–2)
BILIRUB DIRECT SERPL-MCNC: <0.1 MG/DL — SIGNIFICANT CHANGE UP (ref 0–0.2)
BILIRUB INDIRECT FLD-MCNC: >0.1 MG/DL — LOW (ref 0.2–1)
BILIRUB SERPL-MCNC: 0.2 MG/DL — SIGNIFICANT CHANGE UP (ref 0.2–1.2)
BLD GP AB SCN SERPL QL: NEGATIVE — SIGNIFICANT CHANGE UP
BUN SERPL-MCNC: 14 MG/DL — SIGNIFICANT CHANGE UP (ref 7–23)
CALCIUM SERPL-MCNC: 8.7 MG/DL — SIGNIFICANT CHANGE UP (ref 8.4–10.5)
CHLORIDE SERPL-SCNC: 104 MMOL/L — SIGNIFICANT CHANGE UP (ref 96–108)
CO2 SERPL-SCNC: 24 MMOL/L — SIGNIFICANT CHANGE UP (ref 22–31)
CREAT SERPL-MCNC: 0.91 MG/DL — SIGNIFICANT CHANGE UP (ref 0.5–1.3)
EOSINOPHIL # BLD AUTO: 0 K/UL — SIGNIFICANT CHANGE UP (ref 0–0.5)
EOSINOPHIL NFR BLD AUTO: 0 % — SIGNIFICANT CHANGE UP (ref 0–6)
FIBRINOGEN PPP-MCNC: 433 MG/DL — SIGNIFICANT CHANGE UP (ref 350–510)
GIANT PLATELETS BLD QL SMEAR: PRESENT — SIGNIFICANT CHANGE UP
GLUCOSE SERPL-MCNC: 106 MG/DL — HIGH (ref 70–99)
HCT VFR BLD CALC: 22 % — LOW (ref 39–50)
HGB BLD-MCNC: 7.3 G/DL — LOW (ref 13–17)
INR BLD: 1.18 RATIO — HIGH (ref 0.88–1.16)
LDH SERPL L TO P-CCNC: 115 U/L — SIGNIFICANT CHANGE UP (ref 50–242)
LYMPHOCYTES # BLD AUTO: 0 % — LOW (ref 13–44)
LYMPHOCYTES # BLD AUTO: 0 K/UL — LOW (ref 1–3.3)
MAGNESIUM SERPL-MCNC: 1.7 MG/DL — SIGNIFICANT CHANGE UP (ref 1.6–2.6)
MANUAL SMEAR VERIFICATION: SIGNIFICANT CHANGE UP
MCHC RBC-ENTMCNC: 29.3 PG — SIGNIFICANT CHANGE UP (ref 27–34)
MCHC RBC-ENTMCNC: 33.2 GM/DL — SIGNIFICANT CHANGE UP (ref 32–36)
MCV RBC AUTO: 88.4 FL — SIGNIFICANT CHANGE UP (ref 80–100)
MONOCYTES # BLD AUTO: 0.28 K/UL — SIGNIFICANT CHANGE UP (ref 0–0.9)
MONOCYTES NFR BLD AUTO: 8.9 % — SIGNIFICANT CHANGE UP (ref 2–14)
NEUTROPHILS # BLD AUTO: 2.87 K/UL — SIGNIFICANT CHANGE UP (ref 1.8–7.4)
NEUTROPHILS NFR BLD AUTO: 85.7 % — HIGH (ref 43–77)
NEUTS BAND # BLD: 5.4 % — SIGNIFICANT CHANGE UP (ref 0–8)
PHOSPHATE SERPL-MCNC: 4.1 MG/DL — SIGNIFICANT CHANGE UP (ref 2.5–4.5)
PLAT MORPH BLD: ABNORMAL
PLATELET # BLD AUTO: 18 K/UL — CRITICAL LOW (ref 150–400)
POTASSIUM SERPL-MCNC: 4.3 MMOL/L — SIGNIFICANT CHANGE UP (ref 3.5–5.3)
POTASSIUM SERPL-SCNC: 4.3 MMOL/L — SIGNIFICANT CHANGE UP (ref 3.5–5.3)
PROT SERPL-MCNC: 5.7 G/DL — LOW (ref 6–8.3)
PROTHROM AB SERPL-ACNC: 13.5 SEC — HIGH (ref 10–12.9)
RBC # BLD: 2.49 M/UL — LOW (ref 4.2–5.8)
RBC # FLD: 16.7 % — HIGH (ref 10.3–14.5)
RBC BLD AUTO: SIGNIFICANT CHANGE UP
RH IG SCN BLD-IMP: POSITIVE — SIGNIFICANT CHANGE UP
SODIUM SERPL-SCNC: 140 MMOL/L — SIGNIFICANT CHANGE UP (ref 135–145)
TACROLIMUS SERPL-MCNC: 8.5 NG/ML — SIGNIFICANT CHANGE UP
WBC # BLD: 3.15 K/UL — LOW (ref 3.8–10.5)
WBC # FLD AUTO: 3.15 K/UL — LOW (ref 3.8–10.5)

## 2019-10-28 PROCEDURE — 99232 SBSQ HOSP IP/OBS MODERATE 35: CPT

## 2019-10-28 PROCEDURE — 99291 CRITICAL CARE FIRST HOUR: CPT

## 2019-10-28 RX ADMIN — Medication 400 MILLIGRAM(S): at 21:51

## 2019-10-28 RX ADMIN — MAGNESIUM OXIDE 400 MG ORAL TABLET 400 MILLIGRAM(S): 241.3 TABLET ORAL at 17:25

## 2019-10-28 RX ADMIN — TACROLIMUS 1.5 MILLIGRAM(S): 5 CAPSULE ORAL at 17:25

## 2019-10-28 RX ADMIN — TACROLIMUS 1.5 MILLIGRAM(S): 5 CAPSULE ORAL at 05:47

## 2019-10-28 RX ADMIN — FINASTERIDE 5 MILLIGRAM(S): 5 TABLET, FILM COATED ORAL at 12:26

## 2019-10-28 RX ADMIN — Medication 5 MILLILITER(S): at 20:12

## 2019-10-28 RX ADMIN — PANTOPRAZOLE SODIUM 40 MILLIGRAM(S): 20 TABLET, DELAYED RELEASE ORAL at 05:50

## 2019-10-28 RX ADMIN — Medication 1 LOZENGE: at 20:12

## 2019-10-28 RX ADMIN — Medication 650 MILLIGRAM(S): at 07:11

## 2019-10-28 RX ADMIN — SERTRALINE 50 MILLIGRAM(S): 25 TABLET, FILM COATED ORAL at 21:51

## 2019-10-28 RX ADMIN — Medication 1 TABLET(S): at 12:25

## 2019-10-28 RX ADMIN — Medication 650 MILLIGRAM(S): at 07:41

## 2019-10-28 RX ADMIN — MAGNESIUM OXIDE 400 MG ORAL TABLET 400 MILLIGRAM(S): 241.3 TABLET ORAL at 08:54

## 2019-10-28 RX ADMIN — Medication 10 MILLILITER(S): at 20:12

## 2019-10-28 RX ADMIN — Medication 10 MILLILITER(S): at 17:13

## 2019-10-28 RX ADMIN — Medication 5 MILLILITER(S): at 12:26

## 2019-10-28 RX ADMIN — VORICONAZOLE 200 MILLIGRAM(S): 10 INJECTION, POWDER, LYOPHILIZED, FOR SOLUTION INTRAVENOUS at 17:25

## 2019-10-28 RX ADMIN — MYCOPHENOLATE MOFETIL 1000 MILLIGRAM(S): 250 CAPSULE ORAL at 21:51

## 2019-10-28 RX ADMIN — Medication 2.5 MILLIGRAM(S): at 05:47

## 2019-10-28 RX ADMIN — Medication 12.5 MILLIGRAM(S): at 17:25

## 2019-10-28 RX ADMIN — MAGNESIUM OXIDE 400 MG ORAL TABLET 400 MILLIGRAM(S): 241.3 TABLET ORAL at 12:26

## 2019-10-28 RX ADMIN — Medication 5 MILLILITER(S): at 07:11

## 2019-10-28 RX ADMIN — URSODIOL 300 MILLIGRAM(S): 250 TABLET, FILM COATED ORAL at 07:10

## 2019-10-28 RX ADMIN — Medication 5 MILLILITER(S): at 17:13

## 2019-10-28 RX ADMIN — VORICONAZOLE 200 MILLIGRAM(S): 10 INJECTION, POWDER, LYOPHILIZED, FOR SOLUTION INTRAVENOUS at 05:47

## 2019-10-28 RX ADMIN — Medication 1 LOZENGE: at 12:26

## 2019-10-28 RX ADMIN — Medication 10 MILLILITER(S): at 07:11

## 2019-10-28 RX ADMIN — Medication 12.5 MILLIGRAM(S): at 05:47

## 2019-10-28 RX ADMIN — Medication 1 LOZENGE: at 07:11

## 2019-10-28 RX ADMIN — Medication 400 MILLIGRAM(S): at 06:37

## 2019-10-28 RX ADMIN — MYCOPHENOLATE MOFETIL 1000 MILLIGRAM(S): 250 CAPSULE ORAL at 08:54

## 2019-10-28 RX ADMIN — Medication 480 MICROGRAM(S): at 17:25

## 2019-10-28 RX ADMIN — Medication 1 LOZENGE: at 17:13

## 2019-10-28 RX ADMIN — URSODIOL 300 MILLIGRAM(S): 250 TABLET, FILM COATED ORAL at 17:27

## 2019-10-28 RX ADMIN — Medication 1 MILLIGRAM(S): at 21:51

## 2019-10-28 RX ADMIN — Medication 10 MILLILITER(S): at 12:26

## 2019-10-28 RX ADMIN — Medication 400 MILLIGRAM(S): at 14:09

## 2019-10-28 RX ADMIN — MYCOPHENOLATE MOFETIL 1000 MILLIGRAM(S): 250 CAPSULE ORAL at 14:09

## 2019-10-28 RX ADMIN — Medication 1 MILLIGRAM(S): at 12:25

## 2019-10-28 RX ADMIN — TAMSULOSIN HYDROCHLORIDE 0.4 MILLIGRAM(S): 0.4 CAPSULE ORAL at 21:51

## 2019-10-28 RX ADMIN — CEFEPIME 100 MILLIGRAM(S): 1 INJECTION, POWDER, FOR SOLUTION INTRAMUSCULAR; INTRAVENOUS at 05:48

## 2019-10-28 NOTE — PROGRESS NOTE ADULT - SUBJECTIVE AND OBJECTIVE BOX
[facility] - Ede Zamudio, Riki, Edgar, Gem, Tabitha Garcia  Office Number:  [phone]    Patient resting comfortably in bed in NAD.  Laying flat with no respiratory distress.  No complaints of chest pain, dyspnea, palpitations, PND, or orthopnea.  reports back pain    ROS: negative unless otherwise mentioned.    Telemetry:  off    MEDICATIONS  (STANDING):  acetaminophen   Tablet .. 650 milliGRAM(s) Oral every 6 hours  acetaminophen   Tablet .. 650 milliGRAM(s) Oral <User Schedule>  acyclovir   Oral Tab/Cap 400 milliGRAM(s) Oral every 8 hours  ALPRAZolam 1 milliGRAM(s) Oral at bedtime  Biotene Dry Mouth Oral Rinse 5 milliLiter(s) Swish and Spit five times a day  cefepime   IVPB 2000 milliGRAM(s) IV Intermittent every 8 hours  chlorhexidine 4% Liquid 1 Application(s) Topical <User Schedule>  clotrimazole Lozenge 1 Lozenge Oral five times a day  diphenhydrAMINE   Injectable 25 milliGRAM(s) IV Push <User Schedule>  docusate sodium 100 milliGRAM(s) Oral three times a day  enalapril 2.5 milliGRAM(s) Oral daily  filgrastim-sndz Injectable 480 MICROGram(s) SubCutaneous daily  finasteride 5 milliGRAM(s) Oral daily  folic acid 1 milliGRAM(s) Oral daily  heparin  Infusion 339 Unit(s)/Hr (3.39 mL/Hr) IV Continuous <Continuous>  immune   globulin 10% (GAMMAGARD) IVPB 30 Gram(s) IV Intermittent <User Schedule>  lidocaine/prilocaine Cream 1 Application(s) Topical daily  magnesium oxide 400 milliGRAM(s) Oral three times a day with meals  metoprolol succinate ER 12.5 milliGRAM(s) Oral two times a day  multivitamin 1 Tablet(s) Oral daily  mycophenolate mofetil 1000 milliGRAM(s) Oral <User Schedule>  pantoprazole    Tablet 40 milliGRAM(s) Oral before breakfast  senna 2 Tablet(s) Oral at bedtime  sertraline 50 milliGRAM(s) Oral at bedtime  sodium bicarbonate Mouth Rinse 10 milliLiter(s) Swish and Spit five times a day  sodium chloride 0.9%. 1000 milliLiter(s) (20 mL/Hr) IV Continuous <Continuous>  tacrolimus 1.5 milliGRAM(s) Oral two times a day  tamsulosin 0.4 milliGRAM(s) Oral at bedtime  ursodiol Capsule 300 milliGRAM(s) Oral two times a day with meals  voriconazole 200 milliGRAM(s) Oral every 12 hours    MEDICATIONS  (PRN):  acetaminophen   Tablet .. 650 milliGRAM(s) Oral every 6 hours PRN Temp greater or equal to 38C (100.4F), Mild Pain (1 - 3)  acetaminophen  IVPB .. 1000 milliGRAM(s) IV Intermittent once PRN Temp greater or equal to 38.5C (101.3F)  artificial tears (preservative free) Ophthalmic Solution 2 Drop(s) Both EYES two times a day PRN Dry Eyes  diphenhydrAMINE   Injectable 25 milliGRAM(s) IV Push every 4 hours PRN Allergy symptoms  metoclopramide Injectable 10 milliGRAM(s) IV Push every 6 hours PRN Nausea and/or Vomiting  ondansetron Injectable 8 milliGRAM(s) IV Push every 8 hours PRN Nausea and/or Vomiting  sodium chloride 0.9% lock flush 10 milliLiter(s) IV Push every 1 hour PRN Pre/post blood products, medications, blood draw, and to maintain line patency      Allergies    No Known Allergies    Intolerances        Vital Signs Last 24 Hrs  T(C): 36.6 (28 Oct 2019 05:58), Max: 37.2 (27 Oct 2019 09:20)  T(F): 97.9 (28 Oct 2019 05:58), Max: 99 (27 Oct 2019 09:20)  HR: 83 (28 Oct 2019 05:58) (83 - 93)  BP: 123/75 (28 Oct 2019 05:58) (115/71 - 139/81)  BP(mean): --  RR: 18 (28 Oct 2019 05:58) (18 - 18)  SpO2: 98% (28 Oct 2019 05:58) (93% - 100%)    I&O's Summary    27 Oct 2019 07:01  -  28 Oct 2019 07:00  --------------------------------------------------------  IN: 1092 mL / OUT: 1350 mL / NET: -258 mL    28 Oct 2019 07:01  -  28 Oct 2019 08:45  --------------------------------------------------------  IN: 68 mL / OUT: 350 mL / NET: -282 mL        ON EXAM:      Constitutional: well-nourished, well-developed, NAD   HEENT:  MMM, sclerae anicteric, conjunctivae clear, no oral cyanosis.  Pulmonary: Non-labored, coarse faint rhonchi at left base. No wheezing, rales  Cardiovascular: Regular, S1 and S2.  No murmur.  No rubs, gallops or clicks  Gastrointestinal: Bowel Sounds present, soft, nontender.   Lymph: No peripheral edema.   Neurological: Alert, no focal deficits  Skin: No rashes.  Psych:  Mood & affect appropriate  + area of point tenderness on right side of back    LABS: All Labs Reviewed:                        7.3    3.15  )-----------( 18       ( 28 Oct 2019 06:50 )             22.0                         7.6    1.77  )-----------( 26       ( 27 Oct 2019 07:12 )             22.7                         6.5    0.83  )-----------( 36       ( 26 Oct 2019 06:38 )             20.0     28 Oct 2019 06:49    140    |  104    |  14     ----------------------------<  106    4.3     |  24     |  0.91   27 Oct 2019 07:09    144    |  102    |  16     ----------------------------<  98     4.5     |  24     |  1.04   26 Oct 2019 06:50    142    |  105    |  15     ----------------------------<  92     4.0     |  25     |  0.94     Ca    8.7        28 Oct 2019 06:49  Ca    8.8        27 Oct 2019 07:09  Ca    9.4        26 Oct 2019 06:50  Phos  4.1       28 Oct 2019 06:49  Phos  4.3       27 Oct 2019 07:09  Phos  4.1       26 Oct 2019 06:50  Mg     1.7       28 Oct 2019 06:49  Mg     1.4       27 Oct 2019 07:09  Mg     1.6       26 Oct 2019 06:50    TPro  5.7    /  Alb  3.4    /  TBili  0.2    /  DBili  <0.1   /  AST  6      /  ALT  6      /  AlkPhos  76     28 Oct 2019 06:49  TPro  5.6    /  Alb  3.5    /  TBili  0.3    /  DBili  x      /  AST  8      /  ALT  10     /  AlkPhos  72     27 Oct 2019 07:09  TPro  5.5    /  Alb  3.4    /  TBili  0.3    /  DBili  x      /  AST  9      /  ALT  9      /  AlkPhos  68     26 Oct 2019 06:50    PT/INR - ( 28 Oct 2019 06:50 )   PT: 13.5 sec;   INR: 1.18 ratio         PTT - ( 28 Oct 2019 06:50 )  PTT:29.0 sec      Blood Culture:

## 2019-10-28 NOTE — PROGRESS NOTE ADULT - ASSESSMENT
59 year old male, with previously diagnosed acute myeloid leukemia. s/p induction chemotherapy with Daunorubicin/Cytarabine and HIDAC consolidation chemotherapy, now admitted for Haplo-identical stem cell transplant from son:  Cardiac MRI in 9/2019 demonstrated LVEF 24%, with Mid wall myocardial late gadolinium enhancement at the base involving the anteroseptal and inferoseptal wall.    - No evidence of volume overload at present.  He does have some basilar rhonchi today. He is on fluids and has been getting IV Lasix prn to maintain net negative.  His recent CXR is clear.   - Continue to maintain strict I's and O's, and to monitor for signs of volume overload, which he is at risk for. He is about even over last 24 hours  - No evidence of acute ischemia  - BP is overall stable  - Continue ACEI and BB at current doses as tolerated by BP.    - On heparin gtt for hx of dvt/pe and hypercoagulable state, and adjust rate per protocol. Monitor platelet count, now 18. No bleeding, though is clearly at risk.  - Monitor and replete lytes  - To follow while admitted

## 2019-10-28 NOTE — PROGRESS NOTE ADULT - SUBJECTIVE AND OBJECTIVE BOX
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    Denies tongue, throat pain, dyspnea, nausea, vomiting, diarrhea, abdominal pain      O: Vitals:   Vital Signs Last 24 Hrs  T(C): 36.6 (28 Oct 2019 05:58), Max: 37.2 (27 Oct 2019 09:20)  T(F): 97.9 (28 Oct 2019 05:58), Max: 99 (27 Oct 2019 09:20)  HR: 83 (28 Oct 2019 05:58) (83 - 93)  BP: 123/75 (28 Oct 2019 05:58) (115/71 - 139/81)  BP(mean): --  RR: 18 (28 Oct 2019 05:58) (18 - 18)  SpO2: 98% (28 Oct 2019 05:58) (93% - 100%)    Admit weight: 81.5 kg  Daily weight: 78.5 kg    Intake / Output:   10-27 @ 07:01  -  10-28 @ 07:00  --------------------------------------------------------  IN: 1092 mL / OUT: 1350 mL / NET: -258 mL    10-28 @ 07:01  -  10-28 @ 07:48  --------------------------------------------------------  IN: 68 mL / OUT: 350 mL / NET: -282 mL    PE:    Oropharynx: no ulceration or erythema  Oral Mucositis:   -                                             Grade: -  CVS: RRR, normal S1 S2  Lungs: CTA bilaterally   Abdomen: soft, + normoactive BS, NT, ND  Extremities: warm, no edema in BLE . Toe amputation  Gastric Mucositis:     -                                             Grade: -  Intestinal Mucositis:      -                                        Grade: -  Skin: patchy erythematous rash back and upper chest resolving  TLC:  C/D/I  Neuro: Alert and oriented x 3  Pain: none      Labs:     Meds:   Antimicrobials:   acyclovir   Oral Tab/Cap 400 milliGRAM(s) Oral every 8 hours  cefepime   IVPB 2000 milliGRAM(s) IV Intermittent every 8 hours  clotrimazole Lozenge 1 Lozenge Oral five times a day  voriconazole 200 milliGRAM(s) Oral every 12 hours      Heme / Onc:   heparin  Infusion 339 Unit(s)/Hr IV Continuous <Continuous>      GI:  docusate sodium 100 milliGRAM(s) Oral three times a day  pantoprazole    Tablet 40 milliGRAM(s) Oral before breakfast  senna 2 Tablet(s) Oral at bedtime  sodium bicarbonate Mouth Rinse 10 milliLiter(s) Swish and Spit five times a day  ursodiol Capsule 300 milliGRAM(s) Oral two times a day with meals      Cardiovascular:   enalapril 2.5 milliGRAM(s) Oral daily  metoprolol succinate ER 12.5 milliGRAM(s) Oral two times a day  tamsulosin 0.4 milliGRAM(s) Oral at bedtime      Immunologic:   filgrastim-sndz Injectable 480 MICROGram(s) SubCutaneous daily  immune   globulin 10% (GAMMAGARD) IVPB 30 Gram(s) IV Intermittent <User Schedule>  mycophenolate mofetil 1000 milliGRAM(s) Oral <User Schedule>  tacrolimus 1.5 milliGRAM(s) Oral two times a day      Other medications:   acetaminophen   Tablet .. 650 milliGRAM(s) Oral every 6 hours  acetaminophen   Tablet .. 650 milliGRAM(s) Oral <User Schedule>  ALPRAZolam 1 milliGRAM(s) Oral at bedtime  Biotene Dry Mouth Oral Rinse 5 milliLiter(s) Swish and Spit five times a day  chlorhexidine 4% Liquid 1 Application(s) Topical <User Schedule>  diphenhydrAMINE   Injectable 25 milliGRAM(s) IV Push <User Schedule>  finasteride 5 milliGRAM(s) Oral daily  folic acid 1 milliGRAM(s) Oral daily  lidocaine/prilocaine Cream 1 Application(s) Topical daily  magnesium oxide 400 milliGRAM(s) Oral three times a day with meals  multivitamin 1 Tablet(s) Oral daily  sertraline 50 milliGRAM(s) Oral at bedtime  sodium chloride 0.9%. 1000 milliLiter(s) IV Continuous <Continuous>      PRN:   acetaminophen   Tablet .. 650 milliGRAM(s) Oral every 6 hours PRN  acetaminophen  IVPB .. 1000 milliGRAM(s) IV Intermittent once PRN  artificial tears (preservative free) Ophthalmic Solution 2 Drop(s) Both EYES two times a day PRN  diphenhydrAMINE   Injectable 25 milliGRAM(s) IV Push every 4 hours PRN  metoclopramide Injectable 10 milliGRAM(s) IV Push every 6 hours PRN  ondansetron Injectable 8 milliGRAM(s) IV Push every 8 hours PRN  sodium chloride 0.9% lock flush 10 milliLiter(s) IV Push every 1 hour PRN      A/P:   59 year old male with a history of AML  Post Allogeneic PBSCT day +19  Strict I&O, daily weights, prn diuresis   Cardiology following   10/14- Started Tacro, Cellcept and Zarxio  Afebrile, neutropenic - continue Cefepime.  10/11- Patient will need split units of  PRBC if need to be transfused due to low EF.  10/23-  Platelets transfusion reaction yesterday, transfusion workup completed.  10/27- Engrafting.    1. Infectious Disease:   acyclovir   Oral Tab/Cap 400 milliGRAM(s) Oral every 8 hours  cefepime   IVPB 2000 milliGRAM(s) IV Intermittent every 8 hours  clotrimazole Lozenge 1 Lozenge Oral five times a day  voriconazole 200 milliGRAM(s) Oral every 12 hours    2. VOD Prophylaxis: Actigall, Glutamine, Heparin (dosed at 100 units / kg / day)     3. GI Prophylaxis:    pantoprazole    Tablet 40 milliGRAM(s) Oral before breakfast    4. Mouth care - NS / NaHCO3 rinses, Mycelex, Biotene; Skin care     5. GVHD prophylaxis   mycophenolate mofetil 1000 milliGRAM(s) Oral <User Schedule>  tacrolimus 1.5 milliGRAM(s) Oral two times a day  CTX days + 3, + 4     6. Transfuse & replete electrolytes prn   magnesium oxide 400 milliGRAM(s) Oral three times a day with meals  Hypomagnesemia- Supplement magnesium.    7. IV hydration, daily weights, strict I&O, prn diuresis     8. PO intake as tolerated, nutrition follow up as needed, MVI, folic acid     9. Antiemetics, anti-diarrhea medications:   metoclopramide Injectable 10 milliGRAM(s) IV Push every 6 hours PRN  ondansetron Injectable 8 milliGRAM(s) IV Push every 8 hours PRN    10. OOB as tolerated, physical therapy consult if needed     11. Monitor coags / fibrinogen 2x week, vitamin K as needed     12. Monitor closely for clinical changes, monitor for fevers     13. Emotional support provided, plan of care discussed and questions addressed     14. Patient education done regarding chemotherapy prep, plan of care, restrictions and discharge planning     15. Continue regular social work input      I have written the above note for Dr. Quiñones who performed service with me in the room.   Lourdes Nugent  NP-C ([phone])    I have seen and examined patient with NP, I agree with above note as scribed. 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 Slight back pain now resolved.     Denies tongue, throat pain, dyspnea, nausea, vomiting, diarrhea, abdominal pain      O: Vitals:   Vital Signs Last 24 Hrs  T(C): 36.6 (28 Oct 2019 05:58), Max: 37.2 (27 Oct 2019 09:20)  T(F): 97.9 (28 Oct 2019 05:58), Max: 99 (27 Oct 2019 09:20)  HR: 83 (28 Oct 2019 05:58) (83 - 93)  BP: 123/75 (28 Oct 2019 05:58) (115/71 - 139/81)  BP(mean): --  RR: 18 (28 Oct 2019 05:58) (18 - 18)  SpO2: 98% (28 Oct 2019 05:58) (93% - 100%)    Admit weight: 81.5 kg  Daily weight: 78.5 kg    Intake / Output:   10-27 @ 07:01  -  10-28 @ 07:00  --------------------------------------------------------  IN: 1092 mL / OUT: 1350 mL / NET: -258 mL    10-28 @ 07:01  -  10-28 @ 07:48  --------------------------------------------------------  IN: 68 mL / OUT: 350 mL / NET: -282 mL    PE:    Oropharynx: no ulceration or erythema  Oral Mucositis:   -                                             Grade: -  CVS: RRR, normal S1 S2  Lungs: CTA bilaterally   Abdomen: soft, + normoactive BS, NT, ND  Extremities: warm, no edema in BLE . Toe amputation  Gastric Mucositis:     -                                             Grade: -  Intestinal Mucositis:      -                                        Grade: -  Skin: patchy erythematous rash back and upper chest resolving  TLC:  C/D/I  Neuro: Alert and oriented x 3  Pain: none      LABS:    Blood Cultures:                           7.3    3.15  )-----------( 18       ( 28 Oct 2019 06:50 )             22.0         Mean Cell Volume : 88.4 fl  Mean Cell Hemoglobin : 29.3 pg  Mean Cell Hemoglobin Concentration : 33.2 gm/dL  Auto Neutrophil # : 2.87 K/uL  Auto Lymphocyte # : 0.00 K/uL  Auto Monocyte # : 0.28 K/uL  Auto Eosinophil # : 0.00 K/uL  Auto Basophil # : 0.00 K/uL  Auto Neutrophil % : 85.7 %  Auto Lymphocyte % : 0.0 %  Auto Monocyte % : 8.9 %  Auto Eosinophil % : 0.0 %  Auto Basophil % : 0.0 %      10-28    140  |  104  |  14  ----------------------------<  106<H>  4.3   |  24  |  0.91    Ca    8.7      28 Oct 2019 06:49  Phos  4.1     10-28  Mg     1.7     10-28    TPro  5.7<L>  /  Alb  3.4  /  TBili  0.2  /  DBili  <0.1  /  AST  6<L>  /  ALT  6<L>  /  AlkPhos  76  10-28      Mg 1.7  Phos 4.1      PT/INR - ( 28 Oct 2019 06:50 )   PT: 13.5 sec;   INR: 1.18 ratio         PTT - ( 28 Oct 2019 06:50 )  PTT:29.0 sec      Uric Acid --                  Meds:   Antimicrobials:   acyclovir   Oral Tab/Cap 400 milliGRAM(s) Oral every 8 hours  cefepime   IVPB 2000 milliGRAM(s) IV Intermittent every 8 hours  clotrimazole Lozenge 1 Lozenge Oral five times a day  voriconazole 200 milliGRAM(s) Oral every 12 hours      Heme / Onc:   heparin  Infusion 339 Unit(s)/Hr IV Continuous <Continuous>      GI:  docusate sodium 100 milliGRAM(s) Oral three times a day  pantoprazole    Tablet 40 milliGRAM(s) Oral before breakfast  senna 2 Tablet(s) Oral at bedtime  sodium bicarbonate Mouth Rinse 10 milliLiter(s) Swish and Spit five times a day  ursodiol Capsule 300 milliGRAM(s) Oral two times a day with meals      Cardiovascular:   enalapril 2.5 milliGRAM(s) Oral daily  metoprolol succinate ER 12.5 milliGRAM(s) Oral two times a day  tamsulosin 0.4 milliGRAM(s) Oral at bedtime      Immunologic:   filgrastim-sndz Injectable 480 MICROGram(s) SubCutaneous daily  immune   globulin 10% (GAMMAGARD) IVPB 30 Gram(s) IV Intermittent <User Schedule>  mycophenolate mofetil 1000 milliGRAM(s) Oral <User Schedule>  tacrolimus 1.5 milliGRAM(s) Oral two times a day      Other medications:   acetaminophen   Tablet .. 650 milliGRAM(s) Oral every 6 hours  acetaminophen   Tablet .. 650 milliGRAM(s) Oral <User Schedule>  ALPRAZolam 1 milliGRAM(s) Oral at bedtime  Biotene Dry Mouth Oral Rinse 5 milliLiter(s) Swish and Spit five times a day  chlorhexidine 4% Liquid 1 Application(s) Topical <User Schedule>  diphenhydrAMINE   Injectable 25 milliGRAM(s) IV Push <User Schedule>  finasteride 5 milliGRAM(s) Oral daily  folic acid 1 milliGRAM(s) Oral daily  lidocaine/prilocaine Cream 1 Application(s) Topical daily  magnesium oxide 400 milliGRAM(s) Oral three times a day with meals  multivitamin 1 Tablet(s) Oral daily  sertraline 50 milliGRAM(s) Oral at bedtime  sodium chloride 0.9%. 1000 milliLiter(s) IV Continuous <Continuous>      PRN:   acetaminophen   Tablet .. 650 milliGRAM(s) Oral every 6 hours PRN  acetaminophen  IVPB .. 1000 milliGRAM(s) IV Intermittent once PRN  artificial tears (preservative free) Ophthalmic Solution 2 Drop(s) Both EYES two times a day PRN  diphenhydrAMINE   Injectable 25 milliGRAM(s) IV Push every 4 hours PRN  metoclopramide Injectable 10 milliGRAM(s) IV Push every 6 hours PRN  ondansetron Injectable 8 milliGRAM(s) IV Push every 8 hours PRN  sodium chloride 0.9% lock flush 10 milliLiter(s) IV Push every 1 hour PRN      A/P:   59 year old male with a history of AML  Post Allogeneic PBSCT day +19  Strict I&O, daily weights, prn diuresis   Cardiology following   10/14- Started Tacro, Cellcept and Zarxio  Afebrile, neutropenic - continue Cefepime.  10/11- Patient will need split units of  PRBC if need to be transfused due to low EF.  10/23-  Platelets transfusion reaction yesterday, transfusion workup completed.  10/27- Engrafting.    1. Infectious Disease:   acyclovir   Oral Tab/Cap 400 milliGRAM(s) Oral every 8 hours  cefepime   IVPB 2000 milliGRAM(s) IV Intermittent every 8 hours  clotrimazole Lozenge 1 Lozenge Oral five times a day  voriconazole 200 milliGRAM(s) Oral every 12 hours    2. VOD Prophylaxis: Actigall, Glutamine, Heparin (dosed at 100 units / kg / day)     3. GI Prophylaxis:    pantoprazole    Tablet 40 milliGRAM(s) Oral before breakfast    4. Mouth care - NS / NaHCO3 rinses, Mycelex, Biotene; Skin care     5. GVHD prophylaxis   mycophenolate mofetil 1000 milliGRAM(s) Oral <User Schedule>  tacrolimus 1.5 milliGRAM(s) Oral two times a day  CTX days + 3, + 4     6. Transfuse & replete electrolytes prn   magnesium oxide 400 milliGRAM(s) Oral three times a day with meals  Hypomagnesemia- Supplement magnesium.    7. IV hydration, daily weights, strict I&O, prn diuresis     8. PO intake as tolerated, nutrition follow up as needed, MVI, folic acid     9. Antiemetics, anti-diarrhea medications:   metoclopramide Injectable 10 milliGRAM(s) IV Push every 6 hours PRN  ondansetron Injectable 8 milliGRAM(s) IV Push every 8 hours PRN    10. OOB as tolerated, physical therapy consult if needed     11. Monitor coags / fibrinogen 2x week, vitamin K as needed     12. Monitor closely for clinical changes, monitor for fevers     13. Emotional support provided, plan of care discussed and questions addressed     14. Patient education done regarding chemotherapy prep, plan of care, restrictions and discharge planning     15. Continue regular social work input      I have written the above note for Dr. Quiñones who performed service with me in the room.   Lourdes Nugent  NP-C ([phone])    I have seen and examined patient with NP, I agree with above note as scribed.

## 2019-10-28 NOTE — PROGRESS NOTE ADULT - ATTENDING COMMENTS
59 year old male, with previously diagnosed acute myeloid leukemia. s/p induction chemotherapy with Daunorubicin/Cytarabine and HIDAC consolidation chemotherapy, now admitted for Haplo-identical stem cell transplant from son. Conditioning regimen of Fludarabine, Cytoxan, and TBI. Other history includes non-ischemic cardiomyopathy (recent EF 25% 9/19/19), COLLEEN, DVT/PE, HTN, Factor V Leiden and amputation of right toe due to osteomyelitis.  Day + 18, s/p haploPBSCT, early engraftment  Started  Zarxio on day +5,  MMF and Tacrolimus. Check Tacrolimus levels on Monday and Thursday.   Prior fevers likely due to haplostorm,  s/p CTX 2/2.  Follow temps if persist can dose 1 mg/kg of solumedrol.  Blood/urine cultures from 10/10 and 10/12 negative.  Continue cefepime/voriconazole for now along with acyclovir prophylaxis.   Strict I&O, daily weights, prn diuresis   Renal insufficiency- monitor daily creatinine, normal today.   Anxiolytics, antinausea, antidiarrhea medications as needed.  Nutritional support.  Hx non-ischemic CM, followed by Dr. Luke Lyn, last Echo 9/19 (EF 25%).  Monitor for fluid overload.  VOD prophylaxis - low dose heparin gtt (dosed at 100 units / kg / day), glutamine supplementation, Actigall BID   GI prophylaxis - Protonix po QD.  Electrolyte support  Aggressive mouth care and skin care as per protocol.  OOB/ambulate 59 year old male, with previously diagnosed acute myeloid leukemia. s/p induction chemotherapy with Daunorubicin/Cytarabine and HIDAC consolidation chemotherapy, now admitted for Haplo-identical stem cell transplant from son. Conditioning regimen of Fludarabine, Cytoxan, and TBI. Other history includes non-ischemic cardiomyopathy (recent EF 25% 9/19/19), COLLEEN, DVT/PE, HTN, Factor V Leiden and amputation of right toe due to osteomyelitis.  Day + 19, s/p haploPBSCT, early engraftment  Started  Zarxio on day +5,  MMF and Tacrolimus. Check Tacrolimus levels on Monday and Thursday.   Prior fevers likely due to haplostorm,  s/p CTX 2/2.  Follow temps if persist can dose 1 mg/kg of solumedrol.  Blood/urine cultures from 10/10 and 10/12 negative.  Continue voriconazole, acyclovir prophylaxis. Cefepime discontinued with neutrophil recovery; afebrile, cultures negative.  Strict I&O, daily weights, prn diuresis   Renal insufficiency- monitor daily creatinine, normal today.   Anxiolytics, antinausea, antidiarrhea medications as needed.  Nutritional support.  Hx non-ischemic CM, followed by Dr. Luke Lyn, last Echo 9/19 (EF 25%).  Monitor for fluid overload.  VOD prophylaxis - low dose heparin gtt (dosed at 100 units / kg / day), glutamine supplementation, Actigall BID   GI prophylaxis - Protonix po QD.  Electrolyte support  Aggressive mouth care and skin care as per protocol.  OOB/ambulate

## 2019-10-29 LAB
ALBUMIN SERPL ELPH-MCNC: 3.4 G/DL — SIGNIFICANT CHANGE UP (ref 3.3–5)
ALP SERPL-CCNC: 83 U/L — SIGNIFICANT CHANGE UP (ref 40–120)
ALT FLD-CCNC: 7 U/L — LOW (ref 10–45)
ANION GAP SERPL CALC-SCNC: 12 MMOL/L — SIGNIFICANT CHANGE UP (ref 5–17)
ANISOCYTOSIS BLD QL: SLIGHT — SIGNIFICANT CHANGE UP
AST SERPL-CCNC: 7 U/L — LOW (ref 10–40)
BASOPHILS # BLD AUTO: 0 K/UL — SIGNIFICANT CHANGE UP (ref 0–0.2)
BASOPHILS NFR BLD AUTO: 0 % — SIGNIFICANT CHANGE UP (ref 0–2)
BILIRUB SERPL-MCNC: 0.2 MG/DL — SIGNIFICANT CHANGE UP (ref 0.2–1.2)
BUN SERPL-MCNC: 14 MG/DL — SIGNIFICANT CHANGE UP (ref 7–23)
CALCIUM SERPL-MCNC: 9.3 MG/DL — SIGNIFICANT CHANGE UP (ref 8.4–10.5)
CHLORIDE SERPL-SCNC: 101 MMOL/L — SIGNIFICANT CHANGE UP (ref 96–108)
CO2 SERPL-SCNC: 23 MMOL/L — SIGNIFICANT CHANGE UP (ref 22–31)
CREAT SERPL-MCNC: 0.97 MG/DL — SIGNIFICANT CHANGE UP (ref 0.5–1.3)
DACRYOCYTES BLD QL SMEAR: SLIGHT — SIGNIFICANT CHANGE UP
EOSINOPHIL # BLD AUTO: 0 K/UL — SIGNIFICANT CHANGE UP (ref 0–0.5)
EOSINOPHIL NFR BLD AUTO: 0 % — SIGNIFICANT CHANGE UP (ref 0–6)
GLUCOSE SERPL-MCNC: 99 MG/DL — SIGNIFICANT CHANGE UP (ref 70–99)
HCT VFR BLD CALC: 22.6 % — LOW (ref 39–50)
HGB BLD-MCNC: 7.4 G/DL — LOW (ref 13–17)
HYPOCHROMIA BLD QL: SIGNIFICANT CHANGE UP
LDH SERPL L TO P-CCNC: 108 U/L — SIGNIFICANT CHANGE UP (ref 50–242)
LYMPHOCYTES # BLD AUTO: 0 % — LOW (ref 13–44)
LYMPHOCYTES # BLD AUTO: 0 K/UL — LOW (ref 1–3.3)
MAGNESIUM SERPL-MCNC: 1.5 MG/DL — LOW (ref 1.6–2.6)
MANUAL SMEAR VERIFICATION: SIGNIFICANT CHANGE UP
MCHC RBC-ENTMCNC: 29 PG — SIGNIFICANT CHANGE UP (ref 27–34)
MCHC RBC-ENTMCNC: 32.7 GM/DL — SIGNIFICANT CHANGE UP (ref 32–36)
MCV RBC AUTO: 88.6 FL — SIGNIFICANT CHANGE UP (ref 80–100)
MONOCYTES # BLD AUTO: 0.91 K/UL — HIGH (ref 0–0.9)
MONOCYTES NFR BLD AUTO: 18 % — HIGH (ref 2–14)
MYELOCYTES NFR BLD: 1 % — HIGH (ref 0–0)
NEUTROPHILS # BLD AUTO: 4.06 K/UL — SIGNIFICANT CHANGE UP (ref 1.8–7.4)
NEUTROPHILS NFR BLD AUTO: 77 % — SIGNIFICANT CHANGE UP (ref 43–77)
NEUTS BAND # BLD: 3 % — SIGNIFICANT CHANGE UP (ref 0–8)
NRBC # BLD: 0 /100 — SIGNIFICANT CHANGE UP (ref 0–0)
OVALOCYTES BLD QL SMEAR: SLIGHT — SIGNIFICANT CHANGE UP
PHOSPHATE SERPL-MCNC: 4.1 MG/DL — SIGNIFICANT CHANGE UP (ref 2.5–4.5)
PLAT MORPH BLD: NORMAL — SIGNIFICANT CHANGE UP
PLATELET # BLD AUTO: 13 K/UL — CRITICAL LOW (ref 150–400)
POIKILOCYTOSIS BLD QL AUTO: SLIGHT — SIGNIFICANT CHANGE UP
POTASSIUM SERPL-MCNC: 4.5 MMOL/L — SIGNIFICANT CHANGE UP (ref 3.5–5.3)
POTASSIUM SERPL-SCNC: 4.5 MMOL/L — SIGNIFICANT CHANGE UP (ref 3.5–5.3)
PROMYELOCYTES # FLD: 1 % — HIGH (ref 0–0)
PROT SERPL-MCNC: 5.6 G/DL — LOW (ref 6–8.3)
RBC # BLD: 2.55 M/UL — LOW (ref 4.2–5.8)
RBC # FLD: 16.5 % — HIGH (ref 10.3–14.5)
RBC BLD AUTO: ABNORMAL
SODIUM SERPL-SCNC: 136 MMOL/L — SIGNIFICANT CHANGE UP (ref 135–145)
TARGETS BLD QL SMEAR: SLIGHT — SIGNIFICANT CHANGE UP
WBC # BLD: 5.08 K/UL — SIGNIFICANT CHANGE UP (ref 3.8–10.5)
WBC # FLD AUTO: 5.08 K/UL — SIGNIFICANT CHANGE UP (ref 3.8–10.5)

## 2019-10-29 PROCEDURE — 99291 CRITICAL CARE FIRST HOUR: CPT

## 2019-10-29 PROCEDURE — 99232 SBSQ HOSP IP/OBS MODERATE 35: CPT

## 2019-10-29 RX ORDER — MAGNESIUM SULFATE 500 MG/ML
2 VIAL (ML) INJECTION ONCE
Refills: 0 | Status: COMPLETED | OUTPATIENT
Start: 2019-10-29 | End: 2019-10-29

## 2019-10-29 RX ADMIN — MYCOPHENOLATE MOFETIL 1000 MILLIGRAM(S): 250 CAPSULE ORAL at 21:58

## 2019-10-29 RX ADMIN — Medication 1 LOZENGE: at 11:35

## 2019-10-29 RX ADMIN — MAGNESIUM OXIDE 400 MG ORAL TABLET 400 MILLIGRAM(S): 241.3 TABLET ORAL at 16:55

## 2019-10-29 RX ADMIN — Medication 650 MILLIGRAM(S): at 07:40

## 2019-10-29 RX ADMIN — Medication 5 MILLILITER(S): at 11:35

## 2019-10-29 RX ADMIN — MAGNESIUM OXIDE 400 MG ORAL TABLET 400 MILLIGRAM(S): 241.3 TABLET ORAL at 10:08

## 2019-10-29 RX ADMIN — Medication 650 MILLIGRAM(S): at 08:10

## 2019-10-29 RX ADMIN — Medication 10 MILLILITER(S): at 11:35

## 2019-10-29 RX ADMIN — TAMSULOSIN HYDROCHLORIDE 0.4 MILLIGRAM(S): 0.4 CAPSULE ORAL at 21:58

## 2019-10-29 RX ADMIN — Medication 12.5 MILLIGRAM(S): at 17:07

## 2019-10-29 RX ADMIN — Medication 50 GRAM(S): at 10:06

## 2019-10-29 RX ADMIN — Medication 10 MILLILITER(S): at 00:36

## 2019-10-29 RX ADMIN — TACROLIMUS 1.5 MILLIGRAM(S): 5 CAPSULE ORAL at 17:07

## 2019-10-29 RX ADMIN — Medication 400 MILLIGRAM(S): at 13:51

## 2019-10-29 RX ADMIN — Medication 12.5 MILLIGRAM(S): at 06:50

## 2019-10-29 RX ADMIN — FINASTERIDE 5 MILLIGRAM(S): 5 TABLET, FILM COATED ORAL at 11:35

## 2019-10-29 RX ADMIN — MYCOPHENOLATE MOFETIL 1000 MILLIGRAM(S): 250 CAPSULE ORAL at 10:06

## 2019-10-29 RX ADMIN — Medication 400 MILLIGRAM(S): at 06:50

## 2019-10-29 RX ADMIN — Medication 1 LOZENGE: at 00:36

## 2019-10-29 RX ADMIN — Medication 1 TABLET(S): at 11:35

## 2019-10-29 RX ADMIN — Medication 650 MILLIGRAM(S): at 19:00

## 2019-10-29 RX ADMIN — URSODIOL 300 MILLIGRAM(S): 250 TABLET, FILM COATED ORAL at 07:40

## 2019-10-29 RX ADMIN — Medication 400 MILLIGRAM(S): at 21:58

## 2019-10-29 RX ADMIN — URSODIOL 300 MILLIGRAM(S): 250 TABLET, FILM COATED ORAL at 16:54

## 2019-10-29 RX ADMIN — Medication 1 LOZENGE: at 23:53

## 2019-10-29 RX ADMIN — Medication 5 MILLILITER(S): at 20:34

## 2019-10-29 RX ADMIN — Medication 10 MILLILITER(S): at 20:34

## 2019-10-29 RX ADMIN — Medication 5 MILLILITER(S): at 16:53

## 2019-10-29 RX ADMIN — Medication 10 MILLILITER(S): at 23:53

## 2019-10-29 RX ADMIN — Medication 5 MILLILITER(S): at 00:36

## 2019-10-29 RX ADMIN — Medication 1 LOZENGE: at 10:09

## 2019-10-29 RX ADMIN — VORICONAZOLE 200 MILLIGRAM(S): 10 INJECTION, POWDER, LYOPHILIZED, FOR SOLUTION INTRAVENOUS at 06:50

## 2019-10-29 RX ADMIN — SERTRALINE 50 MILLIGRAM(S): 25 TABLET, FILM COATED ORAL at 21:58

## 2019-10-29 RX ADMIN — VORICONAZOLE 200 MILLIGRAM(S): 10 INJECTION, POWDER, LYOPHILIZED, FOR SOLUTION INTRAVENOUS at 17:07

## 2019-10-29 RX ADMIN — Medication 650 MILLIGRAM(S): at 18:40

## 2019-10-29 RX ADMIN — Medication 1 LOZENGE: at 16:54

## 2019-10-29 RX ADMIN — Medication 5 MILLILITER(S): at 23:53

## 2019-10-29 RX ADMIN — Medication 1 MILLIGRAM(S): at 21:58

## 2019-10-29 RX ADMIN — MAGNESIUM OXIDE 400 MG ORAL TABLET 400 MILLIGRAM(S): 241.3 TABLET ORAL at 11:35

## 2019-10-29 RX ADMIN — PANTOPRAZOLE SODIUM 40 MILLIGRAM(S): 20 TABLET, DELAYED RELEASE ORAL at 06:50

## 2019-10-29 RX ADMIN — Medication 2.5 MILLIGRAM(S): at 06:50

## 2019-10-29 RX ADMIN — MYCOPHENOLATE MOFETIL 1000 MILLIGRAM(S): 250 CAPSULE ORAL at 13:51

## 2019-10-29 RX ADMIN — Medication 480 MICROGRAM(S): at 16:53

## 2019-10-29 RX ADMIN — Medication 10 MILLILITER(S): at 16:54

## 2019-10-29 RX ADMIN — Medication 5 MILLILITER(S): at 10:08

## 2019-10-29 RX ADMIN — TACROLIMUS 1.5 MILLIGRAM(S): 5 CAPSULE ORAL at 06:50

## 2019-10-29 RX ADMIN — Medication 1 MILLIGRAM(S): at 11:35

## 2019-10-29 RX ADMIN — Medication 1 LOZENGE: at 20:34

## 2019-10-29 RX ADMIN — Medication 10 MILLILITER(S): at 10:09

## 2019-10-29 NOTE — PROGRESS NOTE ADULT - SUBJECTIVE AND OBJECTIVE BOX
[facility]    Ede Zamudio, Riki, Edgar, Gem, Jose, Tabitha      [phone]    CHIEF COMPLAINT: Patient is a 59y old  Male who presents with a chief complaint of Allogenic stem cell transplant (28 Oct 2019 08:45)      Follow Up: [facility]    Interim history: The patient reports no new symptoms.  Denies chest discomfort and shortness of breath.  No abdominal pain.  No new neurologic symptoms.      MEDICATIONS  (STANDING):  acetaminophen   Tablet .. 650 milliGRAM(s) Oral every 6 hours  acetaminophen   Tablet .. 650 milliGRAM(s) Oral <User Schedule>  acyclovir   Oral Tab/Cap 400 milliGRAM(s) Oral every 8 hours  ALPRAZolam 1 milliGRAM(s) Oral at bedtime  Biotene Dry Mouth Oral Rinse 5 milliLiter(s) Swish and Spit five times a day  chlorhexidine 4% Liquid 1 Application(s) Topical <User Schedule>  clotrimazole Lozenge 1 Lozenge Oral five times a day  docusate sodium 100 milliGRAM(s) Oral three times a day  enalapril 2.5 milliGRAM(s) Oral daily  filgrastim-sndz Injectable 480 MICROGram(s) SubCutaneous daily  finasteride 5 milliGRAM(s) Oral daily  folic acid 1 milliGRAM(s) Oral daily  heparin  Infusion 339 Unit(s)/Hr (3.39 mL/Hr) IV Continuous <Continuous>  lidocaine/prilocaine Cream 1 Application(s) Topical daily  magnesium oxide 400 milliGRAM(s) Oral three times a day with meals  metoprolol succinate ER 12.5 milliGRAM(s) Oral two times a day  multivitamin 1 Tablet(s) Oral daily  mycophenolate mofetil 1000 milliGRAM(s) Oral <User Schedule>  pantoprazole    Tablet 40 milliGRAM(s) Oral before breakfast  senna 2 Tablet(s) Oral at bedtime  sertraline 50 milliGRAM(s) Oral at bedtime  sodium bicarbonate Mouth Rinse 10 milliLiter(s) Swish and Spit five times a day  sodium chloride 0.9%. 1000 milliLiter(s) (20 mL/Hr) IV Continuous <Continuous>  tacrolimus 1.5 milliGRAM(s) Oral two times a day  tamsulosin 0.4 milliGRAM(s) Oral at bedtime  ursodiol Capsule 300 milliGRAM(s) Oral two times a day with meals  voriconazole 200 milliGRAM(s) Oral every 12 hours    MEDICATIONS  (PRN):  acetaminophen   Tablet .. 650 milliGRAM(s) Oral every 6 hours PRN Temp greater or equal to 38C (100.4F), Mild Pain (1 - 3)  acetaminophen  IVPB .. 1000 milliGRAM(s) IV Intermittent once PRN Temp greater or equal to 38.5C (101.3F)  artificial tears (preservative free) Ophthalmic Solution 2 Drop(s) Both EYES two times a day PRN Dry Eyes  diphenhydrAMINE   Injectable 25 milliGRAM(s) IV Push every 4 hours PRN Allergy symptoms  metoclopramide Injectable 10 milliGRAM(s) IV Push every 6 hours PRN Nausea and/or Vomiting  ondansetron Injectable 8 milliGRAM(s) IV Push every 8 hours PRN Nausea and/or Vomiting  sodium chloride 0.9% lock flush 10 milliLiter(s) IV Push every 1 hour PRN Pre/post blood products, medications, blood draw, and to maintain line patency      REVIEW OF SYSTEMS:  eye, ent, GI, , allergic, dermatologic, musculoskeletal and neurologic are negative except as described above    Vital Signs Last 24 Hrs  T(C): 36.6 (29 Oct 2019 00:39), Max: 36.9 (28 Oct 2019 21:07)  T(F): 97.9 (29 Oct 2019 00:39), Max: 98.4 (28 Oct 2019 21:07)  HR: 86 (29 Oct 2019 00:39) (77 - 86)  BP: 108/64 (29 Oct 2019 00:39) (108/64 - 131/80)  BP(mean): --  RR: 18 (29 Oct 2019 00:39) (18 - 18)  SpO2: 97% (28 Oct 2019 21:07) (97% - 99%)    I&O's Summary    27 Oct 2019 07:01  -  28 Oct 2019 07:00  --------------------------------------------------------  IN: 1092 mL / OUT: 1350 mL / NET: -258 mL    28 Oct 2019 07:01  -  29 Oct 2019 04:50  --------------------------------------------------------  IN: 1254.2 mL / OUT: 1000 mL / NET: 254.2 mL        Telemetry past 24h:    PHYSICAL EXAM:    Constitutional: well-nourished, well-developed, NAD   HEENT:  MMM, sclerae anicteric, conjunctivae clear, no oral cyanosis.  Pulmonary: Non-labored, breath sounds are clear bilaterally, No wheezing, rales or rhonchi  Cardiovascular: Regular, S1 and S2.  No murmur.  No rubs, gallops or clicks  Gastrointestinal: Bowel Sounds present, soft, nontender.   Lymph: No peripheral edema.   Neurological: Alert, no focal deficits  Skin: No rashes.  Psych:  Mood & affect appropriate    LABS: All Labs Reviewed:                        7.3    3.15  )-----------( 18       ( 28 Oct 2019 06:50 )             22.0                         7.6    1.77  )-----------( 26       ( 27 Oct 2019 07:12 )             22.7                         6.5    0.83  )-----------( 36       ( 26 Oct 2019 06:38 )             20.0     28 Oct 2019 06:49    140    |  104    |  14     ----------------------------<  106    4.3     |  24     |  0.91   27 Oct 2019 07:09    144    |  102    |  16     ----------------------------<  98     4.5     |  24     |  1.04   26 Oct 2019 06:50    142    |  105    |  15     ----------------------------<  92     4.0     |  25     |  0.94     Ca    8.7        28 Oct 2019 06:49  Ca    8.8        27 Oct 2019 07:09  Ca    9.4        26 Oct 2019 06:50  Phos  4.1       28 Oct 2019 06:49  Phos  4.3       27 Oct 2019 07:09  Phos  4.1       26 Oct 2019 06:50  Mg     1.7       28 Oct 2019 06:49  Mg     1.4       27 Oct 2019 07:09  Mg     1.6       26 Oct 2019 06:50    TPro  5.7    /  Alb  3.4    /  TBili  0.2    /  DBili  <0.1   /  AST  6      /  ALT  6      /  AlkPhos  76     28 Oct 2019 06:49  TPro  5.6    /  Alb  3.5    /  TBili  0.3    /  DBili  x      /  AST  8      /  ALT  10     /  AlkPhos  72     27 Oct 2019 07:09  TPro  5.5    /  Alb  3.4    /  TBili  0.3    /  DBili  x      /  AST  9      /  ALT  9      /  AlkPhos  68     26 Oct 2019 06:50    PT/INR - ( 28 Oct 2019 06:50 )   PT: 13.5 sec;   INR: 1.18 ratio         PTT - ( 28 Oct 2019 06:50 )  PTT:29.0 sec      Blood Culture:         RADIOLOGY:    EKG:    Echo:

## 2019-10-29 NOTE — CHART NOTE - NSCHARTNOTEFT_GEN_A_CORE
Nutrition Follow Up Note  Patient seen for nutrition follow up and start of Calorie Count today (10/29).    Source:  Pt and medical record    Diet : Regular + Glutasolve x 1/day     Patient reports poor appetite and PO intake. States he is "sick of hospital food". Pt reports his girlfriend brings him canned chicken soup, chicken nuggets, and Rice-a-landy from home. Pt denies nausea and vomiting. Reports last BM yesterday (10/28). Discussed calorie count with patient; Pt amenable to telling nurses what he has consumed. Reinforced safe food handling.       PO intake : Per flow sheets pt consumed 40% tray on 10/27 and 0% tray on 10/28. Pt reports not having eaten anything yet today. Reports having had all of Shake from yesterday- discussed taking 2 shakes at a time and saving one for later in the refrigerator. Also discussed ordering magic cup, yogurt, and creamy soups throughout the day. Spoke about small frequent meals to make it easier to meet needs throughout the day.      Source for PO intake: Pt and flow sheets     Enteral /Parenteral Nutrition: N/A    Weights: (10/28) 173 pounds (10/21) 173.2 pounds (10/15) 173.7 pounds (10/3) 179 pounds  % Weight Change: 3% loss x 25 days  Pertinent Medications: Zovirax, magnesium sulfate, Xanax, Zofran, Prograf, Mag-Ox 400, Zarxio, biotene, Vasotec, Reglan, Toprol, multivitamin, Protonix, sodium bicarbonate mouthwash, Actigall  Pertinent Labs: (10/29) H/H 7.4/22.6 <L>, AST/SGOT 7 <L>, ALT/SGPT 7 <L>, magnesium 1.5 <L>    Skin per nursing documentation: No pressure injuries   Edema: none per flow sheets    Estimated Needs:   [X] no change since previous assessment  [ ] recalculated:     Previous Nutrition Diagnosis: [X] Inadequate oral intake  Nutrition Diagnosis is: [X] Ongoing; Nutrition care plan in process.  New Nutrition Diagnosis: [X] Not applicable    Interventions:   1) Continue current diet- will continue to monitor and encourage PO intake.  2) Encouraged ordering of Magic Cup x 1/day (provides 290 kcal, 9 g protein) + milkshakes on Shake It Up days.  3) Reinforced Food Handling Safety Guidelines; Importance of kcal and protein intake.  4) Continue to monitor weights to identify changes, if any.    Monitoring and Evaluation:   Continue to monitor Nutritional intake, Tolerance to diet prescription, weights, labs, skin integrity, and further educational needs.    RD remains available upon request and will follow up per protocol.    Chica Gonzales, Dietetic Intern (Pager #[phone]) Nutrition Follow Up Note  Patient seen for nutrition follow up and start of Calorie Count today (10/29).    Source:  Pt and medical record    Diet : Regular + Glutasolve x 1/day     Patient reports poor appetite and PO intake. States he is "sick of hospital food". Pt reports his girlfriend brings him canned chicken soup, chicken nuggets, and Rice-a-landy from home. Pt denies nausea and vomiting. Reports last BM yesterday (10/28). Discussed calorie count with patient; Pt amenable to telling nurses what he has consumed. Reinforced safe food handling.       PO intake : Per flow sheets pt consumed 40% tray on 10/27 and 0% tray on 10/28. Pt reports not having eaten anything yet today. Reports having had all of Shake from yesterday- discussed taking 2 shakes at a time and saving one for later in the refrigerator. Also discussed ordering magic cup, yogurt, and creamy soups throughout the day. Spoke about small frequent meals to make it easier to meet needs throughout the day.      Source for PO intake: Pt and flow sheets     Enteral /Parenteral Nutrition: N/A    Weights: (10/28) 173 pounds (10/21) 173.2 pounds (10/15) 173.7 pounds (10/3) 179 pounds  % Weight Change: 3% loss x 25 days  Pertinent Medications: Zovirax, magnesium sulfate, Xanax, Zofran, Prograf, Mag-Ox 400, Zarxio, biotene, Vasotec, Reglan, Toprol, multivitamin, Protonix, sodium bicarbonate mouthwash, Actigall  Pertinent Labs: (10/29) H/H 7.4/22.6 <L>, AST/SGOT 7 <L>, ALT/SGPT 7 <L>, magnesium 1.5 <L>    Skin per nursing documentation: No pressure injuries   Edema: none per flow sheets    Estimated Needs:   [X] no change since previous assessment  [ ] recalculated:     Previous Nutrition Diagnosis: [X] Inadequate oral intake  Nutrition Diagnosis is: [X] Ongoing; Nutrition care plan in process.  New Nutrition Diagnosis: [X] Not applicable    Interventions:   1) Continue current diet- will continue to monitor and encourage PO intake.  2) Encouraged ordering of Magic Cup x 1/day (provides 290 kcal, 9 g protein) + milkshakes on Shake It Up days.  3) Monitor and replete electrolytes as needed.  4) Reinforced Food Handling Safety Guidelines; Importance of kcal and protein intake.  5) Continue to monitor weights to identify changes, if any.    Monitoring and Evaluation:   Continue to monitor Nutritional intake, Tolerance to diet prescription, weights, labs, skin integrity, and further educational needs.    RD remains available upon request and will follow up per protocol.    Chica Gonzales, Dietetic Intern (Pager #[phone]) Nutrition Follow Up Note  Patient seen for nutrition follow up and start of Calorie Count today (10/29). 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 States he is "sick of hospital food". Pt reports his girlfriend brings him canned chicken soup, chicken nuggets, and Rice-a-landy from home. Pt denies nausea and vomiting. Reports last BM yesterday (10/28). Discussed calorie count with patient; Pt amenable to telling nurses what he has consumed. Reinforced safe food handling.       PO intake : Per flow sheets pt consumed 40% tray on 10/27 and 0% tray on 10/28. Pt reports not having eaten anything yet today. Reports having had all of Shake from yesterday- discussed taking 2 shakes at a time and saving one for later in the refrigerator. Also discussed ordering magic cup, yogurt, and creamy soups throughout the day. Spoke about small frequent meals to make it easier to meet needs throughout the day.      Source for PO intake: Pt and flow sheets     Enteral /Parenteral Nutrition: N/A    Weights: (10/28) 173 pounds (10/21) 173.2 pounds (10/15) 173.7 pounds (10/3) 179 pounds  % Weight Change: 3% loss x 25 days  Pertinent Medications: Zovirax, magnesium sulfate, Xanax, Zofran, Prograf, Mag-Ox 400, Zarxio, biotene, Vasotec, Reglan, Toprol, multivitamin, Protonix, sodium bicarbonate mouthwash, Actigall  Pertinent Labs: (10/29) H/H 7.4/22.6 <L>, AST/SGOT 7 <L>, ALT/SGPT 7 <L>, magnesium 1.5 <L>    Skin per nursing documentation: No pressure injuries   Edema: none per flow sheets    Estimated Needs:   [X] no change since previous assessment  [ ] recalculated:     Previous Nutrition Diagnosis: [X] Inadequate oral intake  Nutrition Diagnosis is: [X] Ongoing; Nutrition care plan in process.  New Nutrition Diagnosis: [X] Not applicable    Interventions:   1) Continue current diet- will continue to monitor and encourage PO intake.  2) Encouraged ordering of Magic Cup x 1/day (provides 290 kcal, 9 g protein) + milkshakes on Shake It Up days.  3) Monitor and replete electrolytes as needed.  4) Reinforced Food Handling Safety Guidelines; Importance of kcal and protein intake.  5) Continue to monitor weights to identify changes, if any.    Monitoring and Evaluation:   Continue to monitor Nutritional intake, Tolerance to diet prescription, weights, labs, skin integrity, and further educational needs.    RD remains available upon request and will follow up per protocol.    Chica Gonzales, Dietetic Intern (Pager #[phone]) Nutrition Follow Up Note  Patient seen for nutrition follow up and start of Calorie Count today (10/29). 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 Pt with AML.    Source:  Pt and medical record    Diet : Regular + Glutasolve x 1/day     Patient reports poor appetite and PO intake. States he is "sick of hospital food". Pt reports his girlfriend brings him canned chicken soup, chicken nuggets, and Rice-a-landy from home. Pt denies nausea and vomiting. Reports last BM yesterday (10/28). Discussed calorie count with patient; Pt amenable to telling nurses what he has consumed. Reinforced safe food handling in preparation for discharge.       PO intake : Per flow sheets pt consumed 40% tray on 10/27 and 0% tray on 10/28. Pt reports not having eaten anything yet today. Reports having had all of Shake from Shake It Up program yesterday- discussed taking 2 shakes at a time and saving one for later in the refrigerator. Also discussed ordering magic cup, yogurt, and creamy soups throughout the day. Spoke about small frequent meals to make it easier to meet needs throughout the day.      Source for PO intake: Pt and flow sheets     Enteral /Parenteral Nutrition: N/A    Weights: (10/28) 173 pounds (10/21) 173.2 pounds (10/15) 173.7 pounds (10/3) 179 pounds  % Weight Change: 3% loss x 25 days  Pertinent Medications: Zovirax, magnesium sulfate, Xanax, Zofran, Prograf, Mag-Ox 400, Zarxio, biotene, Vasotec, Reglan, Toprol, multivitamin, Protonix, sodium bicarbonate mouthwash, Actigall  Pertinent Labs: (10/29) H/H 7.4/22.6 <L>, AST/SGOT 7 <L>, ALT/SGPT 7 <L>, magnesium 1.5 <L>    Skin per nursing documentation: No pressure injuries   Edema: none per flow sheets    Estimated Needs:   [X] no change since previous assessment  [ ] recalculated:     Previous Nutrition Diagnosis: [X] Inadequate oral intake  Nutrition Diagnosis is: [X] Ongoing; Nutrition care plan in process. Being addressed with Calorie Count and pt gradually increasing PO intake.  New Nutrition Diagnosis: [X] Not applicable    Interventions:   1) Continue current diet- will continue to monitor and encourage PO intake.  2) Encouraged ordering of Magic Cup x 1/day (provides 290 gerald, 9 g protein) + milkshakes on Shake It Up days.  3) Monitor and replete electrolytes as needed.  4) Reinforced Food Handling Safety Guidelines; Importance of kcal and protein intake.  5) Continue to monitor weights to identify changes, if any.  6) Rd to follow up with Calorie Count results after completion on 10/31.    Monitoring and Evaluation:   Continue to monitor Nutritional intake, Tolerance to diet prescription, weights, labs, skin integrity, and further educational needs.    RD remains available upon request and will follow up per protocol.    Chica Gonzales, Dietetic Intern (Pager #[phone]) Nutrition Follow Up Note  Patient seen for nutrition follow up and start of Calorie Count today (10/29). 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 Pt with AML.    Source:  Pt and medical record    Diet : Regular + Glutasolve x 1/day     Patient reports poor appetite and PO intake. States he is "sick of hospital food". Pt reports his girlfriend brings him canned chicken soup, chicken nuggets, and Rice-a-landy from home. Pt denies nausea and vomiting. Reports last BM yesterday (10/28). Discussed calorie count with patient; Pt amenable to telling nurses what he has consumed. Reinforced safe food handling in preparation for discharge.       PO intake : Per flow sheets pt consumed 40% tray on 10/27 and 0% tray on 10/28. Pt reports not having eaten anything yet today. Reports having had all of Shake from Shake It Up program yesterday- discussed taking 2 shakes at a time and saving one for later in the refrigerator. Also discussed ordering magic cup, yogurt, and creamy soups throughout the day. Spoke about small frequent meals to make it easier to meet needs throughout the day.      Source for PO intake: Pt and flow sheets     Enteral /Parenteral Nutrition: N/A    Weights: (10/28) 173 pounds (10/21) 173.2 pounds (10/15) 173.7 pounds (10/3) 179 pounds  % Weight Change: 3% loss x 25 days  Pertinent Medications: Zovirax, magnesium sulfate, Xanax, Zofran, Prograf, Mag-Ox 400, Zarxio, biotene, Vasotec, Reglan, Toprol, multivitamin, Protonix, sodium bicarbonate mouthwash, Actigall  Pertinent Labs: (10/29) H/H 7.4/22.6 <L>, AST/SGOT 7 <L>, ALT/SGPT 7 <L>, magnesium 1.5 <L>    Skin per nursing documentation: No pressure injuries   Edema: none per flow sheets    Estimated Needs:   [X] no change since previous assessment  [ ] recalculated:     Previous Nutrition Diagnosis: [X] Inadequate oral intake  Nutrition Diagnosis is: [X] Ongoing; Nutrition care plan in process. Being addressed with Calorie Count and pt gradually increasing PO intake.  New Nutrition Diagnosis: [X] Not applicable    Interventions:   1) Continue current diet- will continue to monitor and encourage PO intake.  2) Encouraged ordering of Magic Cup x 1/day (provides 290 gerald, 9 g protein) + milkshakes on Shake It Up days.  3) Monitor and replete electrolytes as needed.  4) Reinforced Food Handling Safety Guidelines; Importance of kcal and protein intake.  5) Continue to monitor weights to identify changes, if any.  6) Rd to follow up with Calorie Count results after completion.    Monitoring and Evaluation:   Continue to monitor Nutritional intake, Tolerance to diet prescription, weights, labs, skin integrity, and further educational needs.    RD remains available upon request and will follow up per protocol.    Chica Gonzales, Dietetic Intern (Pager #[phone])

## 2019-10-29 NOTE — PROGRESS NOTE ADULT - ASSESSMENT
59 year old male, with previously diagnosed acute myeloid leukemia. s/p induction chemotherapy with Daunorubicin/Cytarabine and HIDAC consolidation chemotherapy, now admitted for Haplo-identical stem cell transplant from son:  Cardiac MRI in 9/2019 demonstrated LVEF 24%, with Mid wall myocardial late gadolinium enhancement at the base involving the anteroseptal and inferoseptal wall.    - No evidence of volume overload at present.  He does have some basilar rhonchi today. He is on fluids and has been getting IV Lasix prn to maintain net negative.  His recent CXR is clear.   - Continue to maintain strict I's and O's, and to monitor for signs of volume overload, which he is at risk for. He is about even over last 24 hours  - No evidence of acute ischemia  - BP is overall stable  - Continue ACEI and BB at current doses as tolerated by BP.    - On heparin gtt for hx of dvt/pe and hypercoagulable state, and adjust rate per protocol. Monitor platelet count, 18 on 10/28. No bleeding, though is clearly at risk.  - Monitor and replete lytes  - To follow while admitted    charting in progress, note written prior to evaluation 59 year old male, with previously diagnosed acute myeloid leukemia. s/p induction chemotherapy with Daunorubicin/Cytarabine and HIDAC consolidation chemotherapy, now admitted for Haplo-identical stem cell transplant from son:  Cardiac MRI in 9/2019 demonstrated LVEF 24%, with Mid wall myocardial late gadolinium enhancement at the base involving the anteroseptal and inferoseptal wall.    - No evidence of volume overload at present.  He does have some basilar rhonchi today. He is on fluids and has been getting IV Lasix prn to maintain net negative.  His recent CXR is clear.   - Continue to maintain strict I's and O's, and to monitor for signs of volume overload, which he is at risk for. He is about even over last 24 hours  - No evidence of acute ischemia  - BP is overall stable  - Continue ACEI and BB at current doses as tolerated by BP.    - On heparin gtt for hx of dvt/pe and hypercoagulable state, and adjust rate per protocol. Monitor platelet count, 18 on 10/28. No bleeding, though is clearly at risk.  - Monitor and replete lytes  - To follow while admitted

## 2019-10-29 NOTE — PROGRESS NOTE ADULT - SUBJECTIVE AND OBJECTIVE BOX
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 Slight back pain now resolved.     Denies tongue, throat pain, dyspnea, nausea, vomiting, diarrhea, abdominal pain      Vital Signs Last 24 Hrs  T(C): 36.6 (29 Oct 2019 06:22), Max: 36.9 (28 Oct 2019 21:07)  T(F): 97.9 (29 Oct 2019 06:22), Max: 98.4 (28 Oct 2019 21:07)  HR: 86 (29 Oct 2019 06:22) (77 - 86)  BP: 118/70 (29 Oct 2019 06:22) (108/64 - 131/80)  BP(mean): --  RR: 18 (29 Oct 2019 06:22) (18 - 18)  SpO2: 97% (28 Oct 2019 21:07) (97% - 99%)    Admit weight: 81.5 kg  Daily weight:     I&O's Summary    28 Oct 2019 07:01  -  29 Oct 2019 07:00  --------------------------------------------------------  IN: 1387.2 mL / OUT: 1000 mL / NET: 387.2 mL    PE:    Oropharynx: no ulceration or erythema  Oral Mucositis:   -                                             Grade: -  CVS: RRR, normal S1 S2  Lungs: CTA bilaterally   Abdomen: soft, + normoactive BS, NT, ND  Extremities: warm, no edema in BLE . Toe amputation  Gastric Mucositis:     -                                             Grade: -  Intestinal Mucositis:      -                                        Grade: -  Skin: patchy erythematous rash back and upper chest resolving  TLC:  C/D/I  Neuro: Alert and oriented x 3  Pain: none    LABS:                        7.4    5.08  )-----------( 13       ( 29 Oct 2019 07:01 )             22.6     10-29    136  |  101  |  14  ----------------------------<  99  4.5   |  23  |  0.97    Ca    9.3      29 Oct 2019 07:01  Phos  4.1     10-29  Mg     1.5     10-29    TPro  5.6<L>  /  Alb  3.4  /  TBili  0.2  /  DBili  x   /  AST  7<L>  /  ALT  7<L>  /  AlkPhos  83  10-29    CAPILLARY BLOOD GLUCOSE    PT/INR - ( 28 Oct 2019 06:50 )   PT: 13.5 sec;   INR: 1.18 ratio      PTT - ( 28 Oct 2019 06:50 )  PTT:29.0 sec    MEDICATIONS  (STANDING):  acetaminophen   Tablet .. 650 milliGRAM(s) Oral every 6 hours  acetaminophen   Tablet .. 650 milliGRAM(s) Oral <User Schedule>  acyclovir   Oral Tab/Cap 400 milliGRAM(s) Oral every 8 hours  ALPRAZolam 1 milliGRAM(s) Oral at bedtime  Biotene Dry Mouth Oral Rinse 5 milliLiter(s) Swish and Spit five times a day  chlorhexidine 4% Liquid 1 Application(s) Topical <User Schedule>  clotrimazole Lozenge 1 Lozenge Oral five times a day  docusate sodium 100 milliGRAM(s) Oral three times a day  enalapril 2.5 milliGRAM(s) Oral daily  filgrastim-sndz Injectable 480 MICROGram(s) SubCutaneous daily  finasteride 5 milliGRAM(s) Oral daily  folic acid 1 milliGRAM(s) Oral daily  heparin  Infusion 339 Unit(s)/Hr (3.39 mL/Hr) IV Continuous <Continuous>  lidocaine/prilocaine Cream 1 Application(s) Topical daily  magnesium oxide 400 milliGRAM(s) Oral three times a day with meals  magnesium sulfate  IVPB 2 Gram(s) IV Intermittent once  metoprolol succinate ER 12.5 milliGRAM(s) Oral two times a day  multivitamin 1 Tablet(s) Oral daily  mycophenolate mofetil 1000 milliGRAM(s) Oral <User Schedule>  pantoprazole    Tablet 40 milliGRAM(s) Oral before breakfast  senna 2 Tablet(s) Oral at bedtime  sertraline 50 milliGRAM(s) Oral at bedtime  sodium bicarbonate Mouth Rinse 10 milliLiter(s) Swish and Spit five times a day  sodium chloride 0.9%. 1000 milliLiter(s) (20 mL/Hr) IV Continuous <Continuous>  tacrolimus 1.5 milliGRAM(s) Oral two times a day  tamsulosin 0.4 milliGRAM(s) Oral at bedtime  ursodiol Capsule 300 milliGRAM(s) Oral two times a day with meals  voriconazole 200 milliGRAM(s) Oral every 12 hours    MEDICATIONS  (PRN):  acetaminophen   Tablet .. 650 milliGRAM(s) Oral every 6 hours PRN Temp greater or equal to 38C (100.4F), Mild Pain (1 - 3)  acetaminophen  IVPB .. 1000 milliGRAM(s) IV Intermittent once PRN Temp greater or equal to 38.5C (101.3F)  artificial tears (preservative free) Ophthalmic Solution 2 Drop(s) Both EYES two times a day PRN Dry Eyes  diphenhydrAMINE   Injectable 25 milliGRAM(s) IV Push every 4 hours PRN Allergy symptoms  metoclopramide Injectable 10 milliGRAM(s) IV Push every 6 hours PRN Nausea and/or Vomiting  ondansetron Injectable 8 milliGRAM(s) IV Push every 8 hours PRN Nausea and/or Vomiting  sodium chloride 0.9% lock flush 10 milliLiter(s) IV Push every 1 hour PRN Pre/post blood products, medications, blood draw, and to maintain line patency    A/P:   59 year old male with a history of AML  Post Allogeneic PBSCT day +20  Strict I&O, daily weights, prn diuresis   Cardiology following   10/14- Started Tacro, Cellcept and Zarxio  Afebrile, neutropenic - continue Cefepime.  10/11- Patient will need split units of  PRBC if need to be transfused due to low EF.  10/23-  Platelets transfusion reaction yesterday, transfusion workup completed.  10/27- Engrafting.  Poor PO intake, calorie count for 48 hours    1. Infectious Disease:   acyclovir   Oral Tab/Cap 400 milliGRAM(s) Oral every 8 hours  cefepime   IVPB 2000 milliGRAM(s) IV Intermittent every 8 hours  clotrimazole Lozenge 1 Lozenge Oral five times a day  voriconazole 200 milliGRAM(s) Oral every 12 hours    2. VOD Prophylaxis: Actigall, Glutamine, Heparin (dosed at 100 units / kg / day)     3. GI Prophylaxis:    pantoprazole    Tablet 40 milliGRAM(s) Oral before breakfast    4. Mouth care - NS / NaHCO3 rinses, Mycelex, Biotene; Skin care     5. GVHD prophylaxis   mycophenolate mofetil 1000 milliGRAM(s) Oral <User Schedule>  tacrolimus 1.5 milliGRAM(s) Oral two times a day  CTX days + 3, + 4     6. Transfuse & replete electrolytes prn   magnesium oxide 400 milliGRAM(s) Oral three times a day with meals  Magnesium Sulfate 2G IV once    7. IV hydration, daily weights, strict I&O, prn diuresis     8. PO intake as tolerated, nutrition follow up as needed, MVI, folic acid     9. Antiemetics, anti-diarrhea medications:   metoclopramide Injectable 10 milliGRAM(s) IV Push every 6 hours PRN  ondansetron Injectable 8 milliGRAM(s) IV Push every 8 hours PRN    10. OOB as tolerated, physical therapy consult if needed     11. Monitor coags / fibrinogen 2x week, vitamin K as needed     12. Monitor closely for clinical changes, monitor for fevers     13. Emotional support provided, plan of care discussed and questions addressed     14. Patient education done regarding chemotherapy prep, plan of care, restrictions and discharge planning     15. Continue regular social work input      I have written the above note for Dr. Quiñones who performed service with me in the room.   Ashley George, ANP-BC ([phone])    I have seen and examined patient with NP, I agree with above note as scribed. 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     S: Patient seen and examined with HPC Transplant Team:     + intermittent loose stools (1x overnight)  + taste alterations and poor PO intake    Denies tongue, throat pain, dyspnea, nausea, vomiting, diarrhea, abdominal pain      Vital Signs Last 24 Hrs  T(C): 36.6 (29 Oct 2019 06:22), Max: 36.9 (28 Oct 2019 21:07)  T(F): 97.9 (29 Oct 2019 06:22), Max: 98.4 (28 Oct 2019 21:07)  HR: 86 (29 Oct 2019 06:22) (77 - 86)  BP: 118/70 (29 Oct 2019 06:22) (108/64 - 131/80)  BP(mean): --  RR: 18 (29 Oct 2019 06:22) (18 - 18)  SpO2: 97% (28 Oct 2019 21:07) (97% - 99%)    Admit weight: 81.5 kg  Daily weight: 77.6 kg    I&O's Summary    28 Oct 2019 07:01  -  29 Oct 2019 07:00  --------------------------------------------------------  IN: 1387.2 mL / OUT: 1000 mL / NET: 387.2 mL    PE:    Oropharynx: no ulceration or erythema  Oral Mucositis:   -                                             Grade: -  CVS: RRR, normal S1 S2  Lungs: CTA bilaterally   Abdomen: soft, + normoactive BS, NT, ND  Extremities: warm, no edema in BLE . Toe amputation  Gastric Mucositis:     -                                             Grade: -  Intestinal Mucositis:      -                                        Grade: -  Skin: patchy erythematous rash back and upper chest resolving  TLC:  C/D/I  Neuro: Alert and oriented x 3  Pain: none    LABS:                        7.4    5.08  )-----------( 13       ( 29 Oct 2019 07:01 )             22.6     10-29    136  |  101  |  14  ----------------------------<  99  4.5   |  23  |  0.97    Ca    9.3      29 Oct 2019 07:01  Phos  4.1     10-29  Mg     1.5     10-29    TPro  5.6<L>  /  Alb  3.4  /  TBili  0.2  /  DBili  x   /  AST  7<L>  /  ALT  7<L>  /  AlkPhos  83  10-29    CAPILLARY BLOOD GLUCOSE    PT/INR - ( 28 Oct 2019 06:50 )   PT: 13.5 sec;   INR: 1.18 ratio      PTT - ( 28 Oct 2019 06:50 )  PTT:29.0 sec    MEDICATIONS  (STANDING):  acetaminophen   Tablet .. 650 milliGRAM(s) Oral every 6 hours  acetaminophen   Tablet .. 650 milliGRAM(s) Oral <User Schedule>  acyclovir   Oral Tab/Cap 400 milliGRAM(s) Oral every 8 hours  ALPRAZolam 1 milliGRAM(s) Oral at bedtime  Biotene Dry Mouth Oral Rinse 5 milliLiter(s) Swish and Spit five times a day  chlorhexidine 4% Liquid 1 Application(s) Topical <User Schedule>  clotrimazole Lozenge 1 Lozenge Oral five times a day  docusate sodium 100 milliGRAM(s) Oral three times a day  enalapril 2.5 milliGRAM(s) Oral daily  filgrastim-sndz Injectable 480 MICROGram(s) SubCutaneous daily  finasteride 5 milliGRAM(s) Oral daily  folic acid 1 milliGRAM(s) Oral daily  heparin  Infusion 339 Unit(s)/Hr (3.39 mL/Hr) IV Continuous <Continuous>  lidocaine/prilocaine Cream 1 Application(s) Topical daily  magnesium oxide 400 milliGRAM(s) Oral three times a day with meals  magnesium sulfate  IVPB 2 Gram(s) IV Intermittent once  metoprolol succinate ER 12.5 milliGRAM(s) Oral two times a day  multivitamin 1 Tablet(s) Oral daily  mycophenolate mofetil 1000 milliGRAM(s) Oral <User Schedule>  pantoprazole    Tablet 40 milliGRAM(s) Oral before breakfast  senna 2 Tablet(s) Oral at bedtime  sertraline 50 milliGRAM(s) Oral at bedtime  sodium bicarbonate Mouth Rinse 10 milliLiter(s) Swish and Spit five times a day  sodium chloride 0.9%. 1000 milliLiter(s) (20 mL/Hr) IV Continuous <Continuous>  tacrolimus 1.5 milliGRAM(s) Oral two times a day  tamsulosin 0.4 milliGRAM(s) Oral at bedtime  ursodiol Capsule 300 milliGRAM(s) Oral two times a day with meals  voriconazole 200 milliGRAM(s) Oral every 12 hours    MEDICATIONS  (PRN):  acetaminophen   Tablet .. 650 milliGRAM(s) Oral every 6 hours PRN Temp greater or equal to 38C (100.4F), Mild Pain (1 - 3)  acetaminophen  IVPB .. 1000 milliGRAM(s) IV Intermittent once PRN Temp greater or equal to 38.5C (101.3F)  artificial tears (preservative free) Ophthalmic Solution 2 Drop(s) Both EYES two times a day PRN Dry Eyes  diphenhydrAMINE   Injectable 25 milliGRAM(s) IV Push every 4 hours PRN Allergy symptoms  metoclopramide Injectable 10 milliGRAM(s) IV Push every 6 hours PRN Nausea and/or Vomiting  ondansetron Injectable 8 milliGRAM(s) IV Push every 8 hours PRN Nausea and/or Vomiting  sodium chloride 0.9% lock flush 10 milliLiter(s) IV Push every 1 hour PRN Pre/post blood products, medications, blood draw, and to maintain line patency    A/P:   59 year old male with a history of AML  Post Allogeneic PBSCT day +20  Strict I&O, daily weights, prn diuresis   Cardiology following   10/14- Started Tacro, Cellcept and Zarxio  Afebrile, neutropenic - continue Cefepime.  10/11- Patient will need split units of  PRBC if need to be transfused due to low EF.  10/23-  Platelets transfusion reaction yesterday, transfusion workup completed.  10/27- Engrafting.  Poor PO intake, calorie count for 48 hours    1. Infectious Disease:   acyclovir   Oral Tab/Cap 400 milliGRAM(s) Oral every 8 hours  cefepime   IVPB 2000 milliGRAM(s) IV Intermittent every 8 hours  clotrimazole Lozenge 1 Lozenge Oral five times a day  voriconazole 200 milliGRAM(s) Oral every 12 hours    2. VOD Prophylaxis: Actigall, Glutamine, Heparin (dosed at 100 units / kg / day)     3. GI Prophylaxis:    pantoprazole    Tablet 40 milliGRAM(s) Oral before breakfast    4. Mouth care - NS / NaHCO3 rinses, Mycelex, Biotene; Skin care     5. GVHD prophylaxis   mycophenolate mofetil 1000 milliGRAM(s) Oral <User Schedule>  tacrolimus 1.5 milliGRAM(s) Oral two times a day  CTX days + 3, + 4     6. Transfuse & replete electrolytes prn   magnesium oxide 400 milliGRAM(s) Oral three times a day with meals  Magnesium Sulfate 2G IV once    7. IV hydration, daily weights, strict I&O, prn diuresis     8. PO intake as tolerated, nutrition follow up as needed, MVI, folic acid     9. Antiemetics, anti-diarrhea medications:   metoclopramide Injectable 10 milliGRAM(s) IV Push every 6 hours PRN  ondansetron Injectable 8 milliGRAM(s) IV Push every 8 hours PRN    10. OOB as tolerated, physical therapy consult if needed     11. Monitor coags / fibrinogen 2x week, vitamin K as needed     12. Monitor closely for clinical changes, monitor for fevers     13. Emotional support provided, plan of care discussed and questions addressed     14. Patient education done regarding chemotherapy prep, plan of care, restrictions and discharge planning     15. Continue regular social work input      I have written the above note for Dr. Quiñones who performed service with me in the room.   Ashley George, ANP-BC ([phone])    I have seen and examined patient with NP, I agree with above note as scribed. 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     S: Patient seen and examined with HPC Transplant Team:     + intermittent loose stools (1x overnight)  + taste alterations and poor PO intake    Denies mouth, throat pain; cough, dyspnea, nausea, vomiting, diarrhea, abdominal pain      Vital Signs Last 24 Hrs  T(C): 36.6 (29 Oct 2019 06:22), Max: 36.9 (28 Oct 2019 21:07)  T(F): 97.9 (29 Oct 2019 06:22), Max: 98.4 (28 Oct 2019 21:07)  HR: 86 (29 Oct 2019 06:22) (77 - 86)  BP: 118/70 (29 Oct 2019 06:22) (108/64 - 131/80)  BP(mean): --  RR: 18 (29 Oct 2019 06:22) (18 - 18)  SpO2: 97% (28 Oct 2019 21:07) (97% - 99%)    Admit weight: 81.5 kg  Daily weight: 77.6 kg    I&O's Summary    28 Oct 2019 07:01  -  29 Oct 2019 07:00  --------------------------------------------------------  IN: 1387.2 mL / OUT: 1000 mL / NET: 387.2 mL    PE:    Oropharynx: no ulceration or erythema  Oral Mucositis:   -                                             Grade: -  CVS: RRR, normal S1 S2  Lungs: CTA bilaterally   Abdomen: soft, + normoactive BS, NT, ND  Extremities: warm, no edema in BLE . Toe amputation  Gastric Mucositis:     -                                             Grade: -  Intestinal Mucositis:      -                                        Grade: -  Skin: patchy erythematous rash back and upper chest resolving  TLC:  C/D/I  Neuro: Alert and oriented x 3  Pain: none    LABS:                        7.4    5.08  )-----------( 13       ( 29 Oct 2019 07:01 )             22.6     10-29    136  |  101  |  14  ----------------------------<  99  4.5   |  23  |  0.97    Ca    9.3      29 Oct 2019 07:01  Phos  4.1     10-29  Mg     1.5     10-29    TPro  5.6<L>  /  Alb  3.4  /  TBili  0.2  /  DBili  x   /  AST  7<L>  /  ALT  7<L>  /  AlkPhos  83  10-29    CAPILLARY BLOOD GLUCOSE    PT/INR - ( 28 Oct 2019 06:50 )   PT: 13.5 sec;   INR: 1.18 ratio      PTT - ( 28 Oct 2019 06:50 )  PTT:29.0 sec    MEDICATIONS  (STANDING):  acetaminophen   Tablet .. 650 milliGRAM(s) Oral every 6 hours  acetaminophen   Tablet .. 650 milliGRAM(s) Oral <User Schedule>  acyclovir   Oral Tab/Cap 400 milliGRAM(s) Oral every 8 hours  ALPRAZolam 1 milliGRAM(s) Oral at bedtime  Biotene Dry Mouth Oral Rinse 5 milliLiter(s) Swish and Spit five times a day  chlorhexidine 4% Liquid 1 Application(s) Topical <User Schedule>  clotrimazole Lozenge 1 Lozenge Oral five times a day  docusate sodium 100 milliGRAM(s) Oral three times a day  enalapril 2.5 milliGRAM(s) Oral daily  filgrastim-sndz Injectable 480 MICROGram(s) SubCutaneous daily  finasteride 5 milliGRAM(s) Oral daily  folic acid 1 milliGRAM(s) Oral daily  heparin  Infusion 339 Unit(s)/Hr (3.39 mL/Hr) IV Continuous <Continuous>  lidocaine/prilocaine Cream 1 Application(s) Topical daily  magnesium oxide 400 milliGRAM(s) Oral three times a day with meals  magnesium sulfate  IVPB 2 Gram(s) IV Intermittent once  metoprolol succinate ER 12.5 milliGRAM(s) Oral two times a day  multivitamin 1 Tablet(s) Oral daily  mycophenolate mofetil 1000 milliGRAM(s) Oral <User Schedule>  pantoprazole    Tablet 40 milliGRAM(s) Oral before breakfast  senna 2 Tablet(s) Oral at bedtime  sertraline 50 milliGRAM(s) Oral at bedtime  sodium bicarbonate Mouth Rinse 10 milliLiter(s) Swish and Spit five times a day  sodium chloride 0.9%. 1000 milliLiter(s) (20 mL/Hr) IV Continuous <Continuous>  tacrolimus 1.5 milliGRAM(s) Oral two times a day  tamsulosin 0.4 milliGRAM(s) Oral at bedtime  ursodiol Capsule 300 milliGRAM(s) Oral two times a day with meals  voriconazole 200 milliGRAM(s) Oral every 12 hours    MEDICATIONS  (PRN):  acetaminophen   Tablet .. 650 milliGRAM(s) Oral every 6 hours PRN Temp greater or equal to 38C (100.4F), Mild Pain (1 - 3)  acetaminophen  IVPB .. 1000 milliGRAM(s) IV Intermittent once PRN Temp greater or equal to 38.5C (101.3F)  artificial tears (preservative free) Ophthalmic Solution 2 Drop(s) Both EYES two times a day PRN Dry Eyes  diphenhydrAMINE   Injectable 25 milliGRAM(s) IV Push every 4 hours PRN Allergy symptoms  metoclopramide Injectable 10 milliGRAM(s) IV Push every 6 hours PRN Nausea and/or Vomiting  ondansetron Injectable 8 milliGRAM(s) IV Push every 8 hours PRN Nausea and/or Vomiting  sodium chloride 0.9% lock flush 10 milliLiter(s) IV Push every 1 hour PRN Pre/post blood products, medications, blood draw, and to maintain line patency    A/P:   59 year old male with a history of AML  Post Allogeneic PBSCT day +20  Strict I&O, daily weights, prn diuresis   Cardiology following   10/14- Started Tacro, Cellcept and Zarxio  Afebrile, neutropenic - continue Cefepime.  10/11- Patient will need split units of  PRBC if need to be transfused due to low EF.  10/23-  Platelets transfusion reaction yesterday, transfusion workup completed.  10/27- Engrafting.  Poor PO intake, calorie count for 48 hours    1. Infectious Disease:   acyclovir   Oral Tab/Cap 400 milliGRAM(s) Oral every 8 hours  clotrimazole Lozenge 1 Lozenge Oral five times a day  voriconazole 200 milliGRAM(s) Oral every 12 hours    2. VOD Prophylaxis: Actigall, Glutamine, Heparin (dosed at 100 units / kg / day)     3. GI Prophylaxis:    pantoprazole    Tablet 40 milliGRAM(s) Oral before breakfast    4. Mouth care - NS / NaHCO3 rinses, Mycelex, Biotene; Skin care     5. GVHD prophylaxis   mycophenolate mofetil 1000 milliGRAM(s) Oral <User Schedule>  tacrolimus 1.5 milliGRAM(s) Oral two times a day  CTX days + 3, + 4     6. Transfuse & replete electrolytes prn   magnesium oxide 400 milliGRAM(s) Oral three times a day with meals  Magnesium Sulfate 2G IV once    7. IV hydration, daily weights, strict I&O, prn diuresis     8. PO intake as tolerated, nutrition follow up as needed, MVI, folic acid     9. Antiemetics, anti-diarrhea medications:   metoclopramide Injectable 10 milliGRAM(s) IV Push every 6 hours PRN  ondansetron Injectable 8 milliGRAM(s) IV Push every 8 hours PRN    10. OOB as tolerated, physical therapy consult if needed     11. Monitor coags / fibrinogen 2x week, vitamin K as needed     12. Monitor closely for clinical changes, monitor for fevers     13. Emotional support provided, plan of care discussed and questions addressed     14. Patient education done regarding chemotherapy prep, plan of care, restrictions and discharge planning     15. Continue regular social work input      I have written the above note for Dr. Quiñones who performed service with me in the room.   Ashley George, ANP-BC ([phone])    I have seen and examined patient with NP, I agree with above note as scribed.

## 2019-10-29 NOTE — PROGRESS NOTE ADULT - ATTENDING COMMENTS
59 year old male, with previously diagnosed acute myeloid leukemia. s/p induction chemotherapy with Daunorubicin/Cytarabine and HIDAC consolidation chemotherapy, now admitted for Haplo-identical stem cell transplant from son. Conditioning regimen of Fludarabine, Cytoxan, and TBI. Other history includes non-ischemic cardiomyopathy (recent EF 25% 9/19/19), COLLEEN, DVT/PE, HTN, Factor V Leiden and amputation of right toe due to osteomyelitis.  Day + 19, s/p haploPBSCT, early engraftment  Started  Zarxio on day +5,  MMF and Tacrolimus. Check Tacrolimus levels on Monday and Thursday.   Prior fevers likely due to haplostorm,  s/p CTX 2/2.  Follow temps if persist can dose 1 mg/kg of solumedrol.  Blood/urine cultures from 10/10 and 10/12 negative.  Continue voriconazole, acyclovir prophylaxis. Cefepime discontinued with neutrophil recovery; afebrile, cultures negative.  Strict I&O, daily weights, prn diuresis   Renal insufficiency- monitor daily creatinine, normal today.   Anxiolytics, antinausea, antidiarrhea medications as needed.  Nutritional support.  Hx non-ischemic CM, followed by Dr. Luke Lyn, last Echo 9/19 (EF 25%).  Monitor for fluid overload.  VOD prophylaxis - low dose heparin gtt (dosed at 100 units / kg / day), glutamine supplementation, Actigall BID   GI prophylaxis - Protonix po QD.  Electrolyte support  Aggressive mouth care and skin care as per protocol.  OOB/ambulate 59 year old male, with previously diagnosed acute myeloid leukemia. s/p induction chemotherapy with Daunorubicin/Cytarabine and HIDAC consolidation chemotherapy, now admitted for Haplo-identical stem cell transplant from son. Conditioning regimen of Fludarabine, Cytoxan, and TBI. Other history includes non-ischemic cardiomyopathy (recent EF 25% 9/19/19), COLLEEN, DVT/PE, HTN, Factor V Leiden and amputation of right toe due to osteomyelitis.  Day + 20, s/p haploPBSCT, with neutrophil engraftment  Started  Zarxio on day +5,  MMF and Tacrolimus. Check Tacrolimus levels on Monday and Thursday.   Prior fevers likely due to haplostorm,  s/p CTX 2/2.  Follow temps if persist can dose 1 mg/kg of solumedrol.  Blood/urine cultures from 10/10 and 10/12 negative.  Continue voriconazole, acyclovir prophylaxis. Cefepime discontinued with neutrophil recovery; afebrile, cultures negative.  Strict I&O, daily weights, prn diuresis   Renal insufficiency- monitor daily creatinine, normal today.   Anxiolytics, antinausea, antidiarrhea medications as needed.  Nutritional support.  Hx non-ischemic CM, followed by Dr. Luke Lyn, last Echo 9/19 (EF 25%).  Monitor for fluid overload.  VOD prophylaxis - low dose heparin gtt (dosed at 100 units / kg / day), glutamine supplementation, Actigall BID   GI prophylaxis - Protonix po QD.  Electrolyte support  Aggressive mouth care and skin care as per protocol.  OOB/ambulate

## 2019-10-30 LAB
ALBUMIN SERPL ELPH-MCNC: 3.4 G/DL — SIGNIFICANT CHANGE UP (ref 3.3–5)
ALP SERPL-CCNC: 88 U/L — SIGNIFICANT CHANGE UP (ref 40–120)
ALT FLD-CCNC: 7 U/L — LOW (ref 10–45)
ANION GAP SERPL CALC-SCNC: 12 MMOL/L — SIGNIFICANT CHANGE UP (ref 5–17)
AST SERPL-CCNC: 8 U/L — LOW (ref 10–40)
BASOPHILS # BLD AUTO: 0.01 K/UL — SIGNIFICANT CHANGE UP (ref 0–0.2)
BASOPHILS NFR BLD AUTO: 0.1 % — SIGNIFICANT CHANGE UP (ref 0–2)
BILIRUB DIRECT SERPL-MCNC: <0.1 MG/DL — SIGNIFICANT CHANGE UP (ref 0–0.2)
BILIRUB INDIRECT FLD-MCNC: >0.1 MG/DL — LOW (ref 0.2–1)
BILIRUB SERPL-MCNC: 0.2 MG/DL — SIGNIFICANT CHANGE UP (ref 0.2–1.2)
BLD GP AB SCN SERPL QL: NEGATIVE — SIGNIFICANT CHANGE UP
BUN SERPL-MCNC: 14 MG/DL — SIGNIFICANT CHANGE UP (ref 7–23)
CALCIUM SERPL-MCNC: 9.2 MG/DL — SIGNIFICANT CHANGE UP (ref 8.4–10.5)
CHLORIDE SERPL-SCNC: 105 MMOL/L — SIGNIFICANT CHANGE UP (ref 96–108)
CO2 SERPL-SCNC: 23 MMOL/L — SIGNIFICANT CHANGE UP (ref 22–31)
CREAT SERPL-MCNC: 0.97 MG/DL — SIGNIFICANT CHANGE UP (ref 0.5–1.3)
EOSINOPHIL # BLD AUTO: 0 K/UL — SIGNIFICANT CHANGE UP (ref 0–0.5)
EOSINOPHIL NFR BLD AUTO: 0 % — SIGNIFICANT CHANGE UP (ref 0–6)
GLUCOSE SERPL-MCNC: 107 MG/DL — HIGH (ref 70–99)
HCT VFR BLD CALC: 21.7 % — LOW (ref 39–50)
HGB BLD-MCNC: 7.3 G/DL — LOW (ref 13–17)
IMM GRANULOCYTES NFR BLD AUTO: 9.2 % — HIGH (ref 0–1.5)
LDH SERPL L TO P-CCNC: 129 U/L — SIGNIFICANT CHANGE UP (ref 50–242)
LYMPHOCYTES # BLD AUTO: 0.01 K/UL — LOW (ref 1–3.3)
LYMPHOCYTES # BLD AUTO: 0.1 % — LOW (ref 13–44)
MAGNESIUM SERPL-MCNC: 1.7 MG/DL — SIGNIFICANT CHANGE UP (ref 1.6–2.6)
MANUAL SMEAR VERIFICATION: SIGNIFICANT CHANGE UP
MCHC RBC-ENTMCNC: 29.6 PG — SIGNIFICANT CHANGE UP (ref 27–34)
MCHC RBC-ENTMCNC: 33.6 GM/DL — SIGNIFICANT CHANGE UP (ref 32–36)
MCV RBC AUTO: 87.9 FL — SIGNIFICANT CHANGE UP (ref 80–100)
MONOCYTES # BLD AUTO: 2.06 K/UL — HIGH (ref 0–0.9)
MONOCYTES NFR BLD AUTO: 26.7 % — HIGH (ref 2–14)
NEUTROPHILS # BLD AUTO: 4.93 K/UL — SIGNIFICANT CHANGE UP (ref 1.8–7.4)
NEUTROPHILS NFR BLD AUTO: 63.9 % — SIGNIFICANT CHANGE UP (ref 43–77)
NRBC # BLD: 0 /100 WBCS — SIGNIFICANT CHANGE UP (ref 0–0)
PHOSPHATE SERPL-MCNC: 3.9 MG/DL — SIGNIFICANT CHANGE UP (ref 2.5–4.5)
PLAT MORPH BLD: NORMAL — SIGNIFICANT CHANGE UP
PLATELET # BLD AUTO: 11 K/UL — CRITICAL LOW (ref 150–400)
POTASSIUM SERPL-MCNC: 4.7 MMOL/L — SIGNIFICANT CHANGE UP (ref 3.5–5.3)
POTASSIUM SERPL-SCNC: 4.7 MMOL/L — SIGNIFICANT CHANGE UP (ref 3.5–5.3)
PROT SERPL-MCNC: 5.6 G/DL — LOW (ref 6–8.3)
RBC # BLD: 2.47 M/UL — LOW (ref 4.2–5.8)
RBC # FLD: 16.8 % — HIGH (ref 10.3–14.5)
RBC BLD AUTO: SIGNIFICANT CHANGE UP
RH IG SCN BLD-IMP: POSITIVE — SIGNIFICANT CHANGE UP
SODIUM SERPL-SCNC: 140 MMOL/L — SIGNIFICANT CHANGE UP (ref 135–145)
WBC # BLD: 7.72 K/UL — SIGNIFICANT CHANGE UP (ref 3.8–10.5)
WBC # FLD AUTO: 7.72 K/UL — SIGNIFICANT CHANGE UP (ref 3.8–10.5)

## 2019-10-30 PROCEDURE — 99232 SBSQ HOSP IP/OBS MODERATE 35: CPT

## 2019-10-30 PROCEDURE — 99291 CRITICAL CARE FIRST HOUR: CPT

## 2019-10-30 RX ORDER — OXYCODONE HYDROCHLORIDE 5 MG/1
5 TABLET ORAL EVERY 6 HOURS
Refills: 0 | Status: DISCONTINUED | OUTPATIENT
Start: 2019-10-30 | End: 2019-11-01

## 2019-10-30 RX ORDER — OXYCODONE AND ACETAMINOPHEN 5; 325 MG/1; MG/1
1 TABLET ORAL ONCE
Refills: 0 | Status: DISCONTINUED | OUTPATIENT
Start: 2019-10-30 | End: 2019-10-30

## 2019-10-30 RX ORDER — OXYCODONE HYDROCHLORIDE 5 MG/1
5 TABLET ORAL ONCE
Refills: 0 | Status: DISCONTINUED | OUTPATIENT
Start: 2019-10-30 | End: 2019-10-30

## 2019-10-30 RX ADMIN — SERTRALINE 50 MILLIGRAM(S): 25 TABLET, FILM COATED ORAL at 21:42

## 2019-10-30 RX ADMIN — Medication 5 MILLILITER(S): at 14:12

## 2019-10-30 RX ADMIN — OXYCODONE HYDROCHLORIDE 5 MILLIGRAM(S): 5 TABLET ORAL at 05:00

## 2019-10-30 RX ADMIN — Medication 1 LOZENGE: at 20:00

## 2019-10-30 RX ADMIN — Medication 1 LOZENGE: at 14:12

## 2019-10-30 RX ADMIN — Medication 650 MILLIGRAM(S): at 10:42

## 2019-10-30 RX ADMIN — Medication 10 MILLILITER(S): at 14:13

## 2019-10-30 RX ADMIN — MYCOPHENOLATE MOFETIL 1000 MILLIGRAM(S): 250 CAPSULE ORAL at 21:42

## 2019-10-30 RX ADMIN — MYCOPHENOLATE MOFETIL 1000 MILLIGRAM(S): 250 CAPSULE ORAL at 14:14

## 2019-10-30 RX ADMIN — MAGNESIUM OXIDE 400 MG ORAL TABLET 400 MILLIGRAM(S): 241.3 TABLET ORAL at 08:22

## 2019-10-30 RX ADMIN — URSODIOL 300 MILLIGRAM(S): 250 TABLET, FILM COATED ORAL at 08:22

## 2019-10-30 RX ADMIN — CHLORHEXIDINE GLUCONATE 1 APPLICATION(S): 213 SOLUTION TOPICAL at 08:25

## 2019-10-30 RX ADMIN — PANTOPRAZOLE SODIUM 40 MILLIGRAM(S): 20 TABLET, DELAYED RELEASE ORAL at 06:52

## 2019-10-30 RX ADMIN — Medication 400 MILLIGRAM(S): at 06:51

## 2019-10-30 RX ADMIN — Medication 5 MILLILITER(S): at 17:17

## 2019-10-30 RX ADMIN — Medication 5 MILLILITER(S): at 08:23

## 2019-10-30 RX ADMIN — Medication 10 MILLILITER(S): at 20:00

## 2019-10-30 RX ADMIN — TAMSULOSIN HYDROCHLORIDE 0.4 MILLIGRAM(S): 0.4 CAPSULE ORAL at 21:42

## 2019-10-30 RX ADMIN — OXYCODONE HYDROCHLORIDE 5 MILLIGRAM(S): 5 TABLET ORAL at 04:20

## 2019-10-30 RX ADMIN — MAGNESIUM OXIDE 400 MG ORAL TABLET 400 MILLIGRAM(S): 241.3 TABLET ORAL at 17:17

## 2019-10-30 RX ADMIN — MYCOPHENOLATE MOFETIL 1000 MILLIGRAM(S): 250 CAPSULE ORAL at 08:24

## 2019-10-30 RX ADMIN — OXYCODONE HYDROCHLORIDE 5 MILLIGRAM(S): 5 TABLET ORAL at 22:30

## 2019-10-30 RX ADMIN — Medication 1 LOZENGE: at 08:23

## 2019-10-30 RX ADMIN — MAGNESIUM OXIDE 400 MG ORAL TABLET 400 MILLIGRAM(S): 241.3 TABLET ORAL at 14:10

## 2019-10-30 RX ADMIN — TACROLIMUS 1.5 MILLIGRAM(S): 5 CAPSULE ORAL at 06:52

## 2019-10-30 RX ADMIN — Medication 12.5 MILLIGRAM(S): at 06:53

## 2019-10-30 RX ADMIN — OXYCODONE HYDROCHLORIDE 5 MILLIGRAM(S): 5 TABLET ORAL at 21:50

## 2019-10-30 RX ADMIN — TACROLIMUS 1.5 MILLIGRAM(S): 5 CAPSULE ORAL at 17:18

## 2019-10-30 RX ADMIN — Medication 1 TABLET(S): at 14:10

## 2019-10-30 RX ADMIN — Medication 400 MILLIGRAM(S): at 21:43

## 2019-10-30 RX ADMIN — Medication 2.5 MILLIGRAM(S): at 06:52

## 2019-10-30 RX ADMIN — Medication 650 MILLIGRAM(S): at 11:10

## 2019-10-30 RX ADMIN — Medication 12.5 MILLIGRAM(S): at 17:18

## 2019-10-30 RX ADMIN — VORICONAZOLE 200 MILLIGRAM(S): 10 INJECTION, POWDER, LYOPHILIZED, FOR SOLUTION INTRAVENOUS at 06:52

## 2019-10-30 RX ADMIN — Medication 10 MILLILITER(S): at 08:24

## 2019-10-30 RX ADMIN — Medication 1 LOZENGE: at 17:17

## 2019-10-30 RX ADMIN — Medication 1 MILLIGRAM(S): at 14:11

## 2019-10-30 RX ADMIN — Medication 1 MILLIGRAM(S): at 21:42

## 2019-10-30 RX ADMIN — Medication 10 MILLILITER(S): at 17:17

## 2019-10-30 RX ADMIN — Medication 400 MILLIGRAM(S): at 14:14

## 2019-10-30 RX ADMIN — URSODIOL 300 MILLIGRAM(S): 250 TABLET, FILM COATED ORAL at 17:18

## 2019-10-30 RX ADMIN — VORICONAZOLE 200 MILLIGRAM(S): 10 INJECTION, POWDER, LYOPHILIZED, FOR SOLUTION INTRAVENOUS at 17:18

## 2019-10-30 RX ADMIN — Medication 5 MILLILITER(S): at 19:59

## 2019-10-30 RX ADMIN — FINASTERIDE 5 MILLIGRAM(S): 5 TABLET, FILM COATED ORAL at 14:13

## 2019-10-30 NOTE — PROGRESS NOTE ADULT - REASON FOR ADMISSION
Allogenic stem cell transplant Haplo-identical peripheral blood stem cell transplant from son for treatment of AML

## 2019-10-30 NOTE — PROGRESS NOTE ADULT - ATTENDING COMMENTS
59 year old male, with previously diagnosed acute myeloid leukemia. s/p induction chemotherapy with Daunorubicin/Cytarabine and HIDAC consolidation chemotherapy, now admitted for Haplo-identical stem cell transplant from son. Conditioning regimen of Fludarabine, Cytoxan, and TBI. Other history includes non-ischemic cardiomyopathy (recent EF 25% 9/19/19), COLLEEN, DVT/PE, HTN, Factor V Leiden and amputation of right toe due to osteomyelitis.  Day + 20, s/p haploPBSCT, with neutrophil engraftment  Started  Zarxio on day +5,  MMF and Tacrolimus. Check Tacrolimus levels on Monday and Thursday.   Prior fevers likely due to haplostorm,  s/p CTX 2/2.  Follow temps if persist can dose 1 mg/kg of solumedrol.  Blood/urine cultures from 10/10 and 10/12 negative.  Continue voriconazole, acyclovir prophylaxis. Cefepime discontinued with neutrophil recovery; afebrile, cultures negative.  Strict I&O, daily weights, prn diuresis   Renal insufficiency- monitor daily creatinine, normal today.   Anxiolytics, antinausea, antidiarrhea medications as needed.  Nutritional support.  Hx non-ischemic CM, followed by Dr. Luke Lyn, last Echo 9/19 (EF 25%).  Monitor for fluid overload.  VOD prophylaxis - low dose heparin gtt (dosed at 100 units / kg / day), glutamine supplementation, Actigall BID   GI prophylaxis - Protonix po QD.  Electrolyte support  Aggressive mouth care and skin care as per protocol.  OOB/ambulate 59 year old male, with previously diagnosed acute myeloid leukemia. s/p induction chemotherapy with Daunorubicin/Cytarabine and HIDAC consolidation chemotherapy, now admitted for Haplo-identical stem cell transplant from son. Conditioning regimen of Fludarabine, Cytoxan, and TBI. Other history includes non-ischemic cardiomyopathy (recent EF 25% 9/19/19), COLLEEN, DVT/PE, HTN, Factor V Leiden and amputation of right toe due to osteomyelitis.  Day + 21, s/p haploPBSCT, with neutrophil engraftment  Started  Zarxio on day +5,  MMF and Tacrolimus. Check Tacrolimus levels on Monday and Thursday. D/C Zarxio   Prior fevers likely due to haplostorm,  s/p CTX 2/2.  Follow temps if persist can dose 1 mg/kg of solumedrol.  Blood/urine cultures from 10/10 and 10/12 negative.  Continue voriconazole, acyclovir prophylaxis. Cefepime discontinued with neutrophil recovery; afebrile, cultures negative.  Strict I&O, daily weights, prn diuresis   Renal insufficiency- monitor daily creatinine, normal today.   Anxiolytics, antinausea, antidiarrhea medications as needed.  Nutritional support.  Hx non-ischemic CM, followed by Dr. Luke Lyn, last Echo 9/19 (EF 25%).  Monitor for fluid overload.  VOD prophylaxis - low dose heparin gtt (dosed at 100 units / kg / day), glutamine supplementation, Actigall BID   GI prophylaxis - Protonix po QD.  Electrolyte support  Aggressive mouth care and skin care as per protocol.  OOB/ambulate  Discharge planning

## 2019-10-30 NOTE — PROGRESS NOTE ADULT - SUBJECTIVE AND OBJECTIVE BOX
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     S: Patient seen and examined with HPC Transplant Team:     + intermittent loose stools (1x overnight)  + taste alterations and poor PO intake    Denies mouth, throat pain; cough, dyspnea, nausea, vomiting, diarrhea, abdominal pain      Vital Signs Last 24 Hrs  T(C): 36 (30 Oct 2019 06:00), Max: 36.8 (29 Oct 2019 21:37)  T(F): 96.8 (30 Oct 2019 06:00), Max: 98.3 (29 Oct 2019 21:37)  HR: 92 (30 Oct 2019 06:00) (73 - 92)  BP: 118/74 (30 Oct 2019 06:00) (101/51 - 134/76)  BP(mean): --  RR: 18 (30 Oct 2019 06:00) (18 - 18)  SpO2: 95% (29 Oct 2019 21:37) (94% - 98%)    Admit weight: 81.5 kg  Daily weight:     I&O's Summary    29 Oct 2019 07:01  -  30 Oct 2019 07:00  --------------------------------------------------------  IN: 1165 mL / OUT: 550 mL / NET: 615 mL    PE:    Oropharynx: no ulceration or erythema  Oral Mucositis:   -                                             Grade: -  CVS: RRR, normal S1 S2  Lungs: CTA bilaterally   Abdomen: soft, + normoactive BS, NT, ND  Extremities: warm, no edema in BLE . Toe amputation  Gastric Mucositis:     -                                             Grade: -  Intestinal Mucositis:      -                                        Grade: -  Skin: patchy erythematous rash back and upper chest resolving  TLC:  C/D/I  Neuro: Alert and oriented x 3  Pain: none    LABS:                        7.3    7.72  )-----------( 11       ( 30 Oct 2019 07:00 )             21.7     10-30    140  |  105  |  14  ----------------------------<  107<H>  4.7   |  23  |  0.97    Ca    9.2      30 Oct 2019 07:00  Phos  3.9     10-30  Mg     1.7     10-30    TPro  5.6<L>  /  Alb  3.4  /  TBili  0.2  /  DBili  <0.1  /  AST  8<L>  /  ALT  7<L>  /  AlkPhos  88  10-30    CAPILLARY BLOOD GLUCOSE    MEDICATIONS  (STANDING):  acetaminophen   Tablet .. 650 milliGRAM(s) Oral every 6 hours  acetaminophen   Tablet .. 650 milliGRAM(s) Oral <User Schedule>  acyclovir   Oral Tab/Cap 400 milliGRAM(s) Oral every 8 hours  ALPRAZolam 1 milliGRAM(s) Oral at bedtime  Biotene Dry Mouth Oral Rinse 5 milliLiter(s) Swish and Spit five times a day  chlorhexidine 4% Liquid 1 Application(s) Topical <User Schedule>  clotrimazole Lozenge 1 Lozenge Oral five times a day  docusate sodium 100 milliGRAM(s) Oral three times a day  enalapril 2.5 milliGRAM(s) Oral daily  finasteride 5 milliGRAM(s) Oral daily  folic acid 1 milliGRAM(s) Oral daily  heparin  Infusion 339 Unit(s)/Hr (3.39 mL/Hr) IV Continuous <Continuous>  lidocaine/prilocaine Cream 1 Application(s) Topical daily  magnesium oxide 400 milliGRAM(s) Oral three times a day with meals  metoprolol succinate ER 12.5 milliGRAM(s) Oral two times a day  multivitamin 1 Tablet(s) Oral daily  mycophenolate mofetil 1000 milliGRAM(s) Oral <User Schedule>  pantoprazole    Tablet 40 milliGRAM(s) Oral before breakfast  senna 2 Tablet(s) Oral at bedtime  sertraline 50 milliGRAM(s) Oral at bedtime  sodium bicarbonate Mouth Rinse 10 milliLiter(s) Swish and Spit five times a day  sodium chloride 0.9%. 1000 milliLiter(s) (20 mL/Hr) IV Continuous <Continuous>  tacrolimus 1.5 milliGRAM(s) Oral two times a day  tamsulosin 0.4 milliGRAM(s) Oral at bedtime  ursodiol Capsule 300 milliGRAM(s) Oral two times a day with meals  voriconazole 200 milliGRAM(s) Oral every 12 hours    MEDICATIONS  (PRN):  acetaminophen   Tablet .. 650 milliGRAM(s) Oral every 6 hours PRN Temp greater or equal to 38C (100.4F), Mild Pain (1 - 3)  acetaminophen  IVPB .. 1000 milliGRAM(s) IV Intermittent once PRN Temp greater or equal to 38.5C (101.3F)  artificial tears (preservative free) Ophthalmic Solution 2 Drop(s) Both EYES two times a day PRN Dry Eyes  diphenhydrAMINE   Injectable 25 milliGRAM(s) IV Push every 4 hours PRN Allergy symptoms  metoclopramide Injectable 10 milliGRAM(s) IV Push every 6 hours PRN Nausea and/or Vomiting  ondansetron Injectable 8 milliGRAM(s) IV Push every 8 hours PRN Nausea and/or Vomiting  sodium chloride 0.9% lock flush 10 milliLiter(s) IV Push every 1 hour PRN Pre/post blood products, medications, blood draw, and to maintain line patency    A/P:   59 year old male with a history of AML  Post Allogeneic PBSCT day +21  Strict I&O, daily weights, prn diuresis   Cardiology following   10/14- Started Tacro, Cellcept and Zarxio  Afebrile, neutropenic - continue Cefepime.  10/11- Patient will need split units of  PRBC if need to be transfused due to low EF.  10/23-  Platelets transfusion reaction yesterday, transfusion workup completed.  10/27- Engrafting.  Poor PO intake, calorie count for 48 hours    1. Infectious Disease:   acyclovir   Oral Tab/Cap 400 milliGRAM(s) Oral every 8 hours  clotrimazole Lozenge 1 Lozenge Oral five times a day  voriconazole 200 milliGRAM(s) Oral every 12 hours    2. VOD Prophylaxis: Actigall, Glutamine, Heparin (dosed at 100 units / kg / day)     3. GI Prophylaxis:    pantoprazole    Tablet 40 milliGRAM(s) Oral before breakfast    4. Mouth care - NS / NaHCO3 rinses, Mycelex, Biotene; Skin care     5. GVHD prophylaxis   mycophenolate mofetil 1000 milliGRAM(s) Oral <User Schedule>  tacrolimus 1.5 milliGRAM(s) Oral two times a day  CTX days + 3, + 4     6. Transfuse & replete electrolytes prn   magnesium oxide 400 milliGRAM(s) Oral three times a day with meals    7. IV hydration, daily weights, strict I&O, prn diuresis     8. PO intake as tolerated, nutrition follow up as needed, MVI, folic acid     9. Antiemetics, anti-diarrhea medications:   metoclopramide Injectable 10 milliGRAM(s) IV Push every 6 hours PRN  ondansetron Injectable 8 milliGRAM(s) IV Push every 8 hours PRN    10. OOB as tolerated, physical therapy consult if needed     11. Monitor coags / fibrinogen 2x week, vitamin K as needed     12. Monitor closely for clinical changes, monitor for fevers     13. Emotional support provided, plan of care discussed and questions addressed     14. Patient education done regarding chemotherapy prep, plan of care, restrictions and discharge planning     15. Continue regular social work input      I have written the above note for Dr. Qiuñones who performed service with me in the room.   Ashley George, ANP-BC ([phone])    I have seen and examined patient with NP, I agree with above note as scribed. 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     S: Patient seen and examined with HPC Transplant Team:     No further loose stools  + taste alterations and poor PO intake  + headache overnight  + continued right hip pain    Denies mouth, throat pain; cough, dyspnea, nausea, vomiting, diarrhea, abdominal pain      Vital Signs Last 24 Hrs  T(C): 36 (30 Oct 2019 06:00), Max: 36.8 (29 Oct 2019 21:37)  T(F): 96.8 (30 Oct 2019 06:00), Max: 98.3 (29 Oct 2019 21:37)  HR: 92 (30 Oct 2019 06:00) (73 - 92)  BP: 118/74 (30 Oct 2019 06:00) (101/51 - 134/76)  BP(mean): --  RR: 18 (30 Oct 2019 06:00) (18 - 18)  SpO2: 95% (29 Oct 2019 21:37) (94% - 98%)    Admit weight: 81.5 kg  Daily weight: 77.9 kg    I&O's Summary    29 Oct 2019 07:01  -  30 Oct 2019 07:00  --------------------------------------------------------  IN: 1165 mL / OUT: 550 mL / NET: 615 mL    PE:    Oropharynx: no ulceration or erythema  Oral Mucositis:   -                                             Grade: -  CVS: RRR, normal S1 S2  Lungs: CTA bilaterally   Abdomen: soft, + normoactive BS, NT, ND  Extremities: warm, no edema in BLE . Toe amputation  Gastric Mucositis:     -                                             Grade: -  Intestinal Mucositis:      -                                        Grade: -  Skin: patchy erythematous rash back and upper chest stable  TLC:  C/D/I  Neuro: Alert and oriented x 3  Pain: none    LABS:                        7.3    7.72  )-----------( 11       ( 30 Oct 2019 07:00 )             21.7     10-30    140  |  105  |  14  ----------------------------<  107<H>  4.7   |  23  |  0.97    Ca    9.2      30 Oct 2019 07:00  Phos  3.9     10-30  Mg     1.7     10-30    TPro  5.6<L>  /  Alb  3.4  /  TBili  0.2  /  DBili  <0.1  /  AST  8<L>  /  ALT  7<L>  /  AlkPhos  88  10-30    CAPILLARY BLOOD GLUCOSE    MEDICATIONS  (STANDING):  acetaminophen   Tablet .. 650 milliGRAM(s) Oral every 6 hours  acetaminophen   Tablet .. 650 milliGRAM(s) Oral <User Schedule>  acyclovir   Oral Tab/Cap 400 milliGRAM(s) Oral every 8 hours  ALPRAZolam 1 milliGRAM(s) Oral at bedtime  Biotene Dry Mouth Oral Rinse 5 milliLiter(s) Swish and Spit five times a day  chlorhexidine 4% Liquid 1 Application(s) Topical <User Schedule>  clotrimazole Lozenge 1 Lozenge Oral five times a day  docusate sodium 100 milliGRAM(s) Oral three times a day  enalapril 2.5 milliGRAM(s) Oral daily  finasteride 5 milliGRAM(s) Oral daily  folic acid 1 milliGRAM(s) Oral daily  heparin  Infusion 339 Unit(s)/Hr (3.39 mL/Hr) IV Continuous <Continuous>  lidocaine/prilocaine Cream 1 Application(s) Topical daily  magnesium oxide 400 milliGRAM(s) Oral three times a day with meals  metoprolol succinate ER 12.5 milliGRAM(s) Oral two times a day  multivitamin 1 Tablet(s) Oral daily  mycophenolate mofetil 1000 milliGRAM(s) Oral <User Schedule>  pantoprazole    Tablet 40 milliGRAM(s) Oral before breakfast  senna 2 Tablet(s) Oral at bedtime  sertraline 50 milliGRAM(s) Oral at bedtime  sodium bicarbonate Mouth Rinse 10 milliLiter(s) Swish and Spit five times a day  sodium chloride 0.9%. 1000 milliLiter(s) (20 mL/Hr) IV Continuous <Continuous>  tacrolimus 1.5 milliGRAM(s) Oral two times a day  tamsulosin 0.4 milliGRAM(s) Oral at bedtime  ursodiol Capsule 300 milliGRAM(s) Oral two times a day with meals  voriconazole 200 milliGRAM(s) Oral every 12 hours    MEDICATIONS  (PRN):  acetaminophen   Tablet .. 650 milliGRAM(s) Oral every 6 hours PRN Temp greater or equal to 38C (100.4F), Mild Pain (1 - 3)  acetaminophen  IVPB .. 1000 milliGRAM(s) IV Intermittent once PRN Temp greater or equal to 38.5C (101.3F)  artificial tears (preservative free) Ophthalmic Solution 2 Drop(s) Both EYES two times a day PRN Dry Eyes  diphenhydrAMINE   Injectable 25 milliGRAM(s) IV Push every 4 hours PRN Allergy symptoms  metoclopramide Injectable 10 milliGRAM(s) IV Push every 6 hours PRN Nausea and/or Vomiting  ondansetron Injectable 8 milliGRAM(s) IV Push every 8 hours PRN Nausea and/or Vomiting  sodium chloride 0.9% lock flush 10 milliLiter(s) IV Push every 1 hour PRN Pre/post blood products, medications, blood draw, and to maintain line patency    A/P:   59 year old male with a history of AML  Post Allogeneic PBSCT day +21  Strict I&O, daily weights, prn diuresis   Cardiology following   10/14- Started Tacro, Cellcept and Zarxio  Afebrile, neutropenic - continue Cefepime.  10/11- Patient will need split units of  PRBC if need to be transfused due to low EF.  10/23-  Platelets transfusion reaction yesterday, transfusion workup completed.  10/27- Engrafting.  Poor PO intake, continue calorie count for 48 hours.  Zarxio discontinued after dose on 10/29.    1. Infectious Disease:   acyclovir   Oral Tab/Cap 400 milliGRAM(s) Oral every 8 hours  clotrimazole Lozenge 1 Lozenge Oral five times a day  voriconazole 200 milliGRAM(s) Oral every 12 hours    2. VOD Prophylaxis: Actigall, Glutamine, Heparin (dosed at 100 units / kg / day)     3. GI Prophylaxis:    pantoprazole    Tablet 40 milliGRAM(s) Oral before breakfast    4. Mouth care - NS / NaHCO3 rinses, Mycelex, Biotene; Skin care     5. GVHD prophylaxis   mycophenolate mofetil 1000 milliGRAM(s) Oral <User Schedule>  tacrolimus 1.5 milliGRAM(s) Oral two times a day  CTX days + 3, + 4     6. Transfuse & replete electrolytes prn   magnesium oxide 400 milliGRAM(s) Oral three times a day with meals    7. IV hydration, daily weights, strict I&O, prn diuresis     8. PO intake as tolerated, nutrition follow up as needed, MVI, folic acid     9. Antiemetics, anti-diarrhea medications:   metoclopramide Injectable 10 milliGRAM(s) IV Push every 6 hours PRN  ondansetron Injectable 8 milliGRAM(s) IV Push every 8 hours PRN    10. OOB as tolerated, physical therapy consult if needed     11. Monitor coags / fibrinogen 2x week, vitamin K as needed     12. Monitor closely for clinical changes, monitor for fevers     13. Emotional support provided, plan of care discussed and questions addressed     14. Patient education done regarding chemotherapy prep, plan of care, restrictions and discharge planning     15. Continue regular social work input      I have written the above note for Dr. Quiñones who performed service with me in the room.   Ashley George, ANP-BC ([phone])    I have seen and examined patient with NP, I agree with above note as scribed. 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     S: Patient seen and examined with HPC Transplant Team:     No further loose stools  + taste alterations and poor PO intake  + headache overnight  + continued right hip pain    Denies mouth, throat pain; cough, dyspnea, nausea, vomiting, diarrhea, abdominal pain      Vital Signs Last 24 Hrs  T(C): 36 (30 Oct 2019 06:00), Max: 36.8 (29 Oct 2019 21:37)  T(F): 96.8 (30 Oct 2019 06:00), Max: 98.3 (29 Oct 2019 21:37)  HR: 92 (30 Oct 2019 06:00) (73 - 92)  BP: 118/74 (30 Oct 2019 06:00) (101/51 - 134/76)  BP(mean): --  RR: 18 (30 Oct 2019 06:00) (18 - 18)  SpO2: 95% (29 Oct 2019 21:37) (94% - 98%)    Admit weight: 81.5 kg  Daily weight: 77.9 kg    I&O's Summary    29 Oct 2019 07:01  -  30 Oct 2019 07:00  --------------------------------------------------------  IN: 1165 mL / OUT: 550 mL / NET: 615 mL    PE:    Oropharynx: no ulceration or erythema  Oral Mucositis:   -                                             Grade: -  CVS: RRR, normal S1 S2  Lungs: CTA bilaterally   Abdomen: soft, + normoactive BS, NT, ND  Extremities: warm, no edema in BLE . Toe amputation  Gastric Mucositis:     -                                             Grade: -  Intestinal Mucositis:      -                                        Grade: -  Skin: patchy erythematous rash back and upper chest stable  TLC:  C/D/I  Neuro: Alert and oriented x 3  Pain: none    Karnofsky / Lansky score: 60%  GVHD: no evidence of acute GVHD  Skin: 0  Gut: 0   Liver: 0  Overall Stage: 0 - no evidence of acute GVHD     LABS:                        7.3    7.72  )-----------( 11       ( 30 Oct 2019 07:00 )             21.7     10-30    140  |  105  |  14  ----------------------------<  107<H>  4.7   |  23  |  0.97    Ca    9.2      30 Oct 2019 07:00  Phos  3.9     10-30  Mg     1.7     10-30    TPro  5.6<L>  /  Alb  3.4  /  TBili  0.2  /  DBili  <0.1  /  AST  8<L>  /  ALT  7<L>  /  AlkPhos  88  10-30    CAPILLARY BLOOD GLUCOSE    MEDICATIONS  (STANDING):  acetaminophen   Tablet .. 650 milliGRAM(s) Oral every 6 hours  acetaminophen   Tablet .. 650 milliGRAM(s) Oral <User Schedule>  acyclovir   Oral Tab/Cap 400 milliGRAM(s) Oral every 8 hours  ALPRAZolam 1 milliGRAM(s) Oral at bedtime  Biotene Dry Mouth Oral Rinse 5 milliLiter(s) Swish and Spit five times a day  chlorhexidine 4% Liquid 1 Application(s) Topical <User Schedule>  clotrimazole Lozenge 1 Lozenge Oral five times a day  docusate sodium 100 milliGRAM(s) Oral three times a day  enalapril 2.5 milliGRAM(s) Oral daily  finasteride 5 milliGRAM(s) Oral daily  folic acid 1 milliGRAM(s) Oral daily  heparin  Infusion 339 Unit(s)/Hr (3.39 mL/Hr) IV Continuous <Continuous>  lidocaine/prilocaine Cream 1 Application(s) Topical daily  magnesium oxide 400 milliGRAM(s) Oral three times a day with meals  metoprolol succinate ER 12.5 milliGRAM(s) Oral two times a day  multivitamin 1 Tablet(s) Oral daily  mycophenolate mofetil 1000 milliGRAM(s) Oral <User Schedule>  pantoprazole    Tablet 40 milliGRAM(s) Oral before breakfast  senna 2 Tablet(s) Oral at bedtime  sertraline 50 milliGRAM(s) Oral at bedtime  sodium bicarbonate Mouth Rinse 10 milliLiter(s) Swish and Spit five times a day  sodium chloride 0.9%. 1000 milliLiter(s) (20 mL/Hr) IV Continuous <Continuous>  tacrolimus 1.5 milliGRAM(s) Oral two times a day  tamsulosin 0.4 milliGRAM(s) Oral at bedtime  ursodiol Capsule 300 milliGRAM(s) Oral two times a day with meals  voriconazole 200 milliGRAM(s) Oral every 12 hours    MEDICATIONS  (PRN):  acetaminophen   Tablet .. 650 milliGRAM(s) Oral every 6 hours PRN Temp greater or equal to 38C (100.4F), Mild Pain (1 - 3)  acetaminophen  IVPB .. 1000 milliGRAM(s) IV Intermittent once PRN Temp greater or equal to 38.5C (101.3F)  artificial tears (preservative free) Ophthalmic Solution 2 Drop(s) Both EYES two times a day PRN Dry Eyes  diphenhydrAMINE   Injectable 25 milliGRAM(s) IV Push every 4 hours PRN Allergy symptoms  metoclopramide Injectable 10 milliGRAM(s) IV Push every 6 hours PRN Nausea and/or Vomiting  ondansetron Injectable 8 milliGRAM(s) IV Push every 8 hours PRN Nausea and/or Vomiting  sodium chloride 0.9% lock flush 10 milliLiter(s) IV Push every 1 hour PRN Pre/post blood products, medications, blood draw, and to maintain line patency    A/P:   59 year old male with a history of AML  Post Allogeneic PBSCT day +21  Strict I&O, daily weights, prn diuresis   Cardiology following   10/14- Started Tacro, Cellcept and Zarxio  Afebrile, neutropenic - continue Cefepime.  10/11- Patient will need split units of  PRBC if need to be transfused due to low EF.  10/23-  Platelets transfusion reaction yesterday, transfusion workup completed.  10/27- Engrafting.  Poor PO intake, continue calorie count for 48 hours.  Zarxio discontinued after dose on 10/29.    1. Infectious Disease:   acyclovir   Oral Tab/Cap 400 milliGRAM(s) Oral every 8 hours  clotrimazole Lozenge 1 Lozenge Oral five times a day  voriconazole 200 milliGRAM(s) Oral every 12 hours    2. VOD Prophylaxis: Actigall, Glutamine, Heparin (dosed at 100 units / kg / day)     3. GI Prophylaxis:    pantoprazole    Tablet 40 milliGRAM(s) Oral before breakfast    4. Mouth care - NS / NaHCO3 rinses, Mycelex, Biotene; Skin care     5. GVHD prophylaxis   mycophenolate mofetil 1000 milliGRAM(s) Oral <User Schedule>  tacrolimus 1.5 milliGRAM(s) Oral two times a day  CTX days + 3, + 4     6. Transfuse & replete electrolytes prn   magnesium oxide 400 milliGRAM(s) Oral three times a day with meals    7. IV hydration, daily weights, strict I&O, prn diuresis     8. PO intake as tolerated, nutrition follow up as needed, MVI, folic acid     9. Antiemetics, anti-diarrhea medications:   metoclopramide Injectable 10 milliGRAM(s) IV Push every 6 hours PRN  ondansetron Injectable 8 milliGRAM(s) IV Push every 8 hours PRN    10. OOB as tolerated, physical therapy consult if needed     11. Monitor coags / fibrinogen 2x week, vitamin K as needed     12. Monitor closely for clinical changes, monitor for fevers     13. Emotional support provided, plan of care discussed and questions addressed     14. Patient education done regarding chemotherapy prep, plan of care, restrictions and discharge planning     15. Continue regular social work input      I have written the above note for Dr. Quiñones who performed service with me in the room.   Ashley George, ANP-BC ([phone])    I have seen and examined patient with NP, I agree with above note as scribed.

## 2019-10-30 NOTE — PROGRESS NOTE ADULT - SUBJECTIVE AND OBJECTIVE BOX
[facility] -- Ede Zamudio, Edgar Lyn, Jose Rabago Savella  Office # [phone]      Follow Up:  CHF    Subjective/Observations: Patient seen and examined. Events noted. Resting comfortably in bed. No complaints of chest pain, dyspnea, or palpitations reported. No signs of orthopnea or PND. Feels tired today.       REVIEW OF SYSTEMS: All other review of systems is negative unless indicated above    PAST MEDICAL & SURGICAL HISTORY:  COLLEEN (obstructive sleep apnea)  Pulmonary embolism  Deep vein thrombosis (DVT)  H/O cardiomyopathy  Factor 5 Leiden mutation, heterozygous      MEDICATIONS  (STANDING):  acetaminophen   Tablet .. 650 milliGRAM(s) Oral every 6 hours  acetaminophen   Tablet .. 650 milliGRAM(s) Oral <User Schedule>  acyclovir   Oral Tab/Cap 400 milliGRAM(s) Oral every 8 hours  ALPRAZolam 1 milliGRAM(s) Oral at bedtime  Biotene Dry Mouth Oral Rinse 5 milliLiter(s) Swish and Spit five times a day  chlorhexidine 4% Liquid 1 Application(s) Topical <User Schedule>  clotrimazole Lozenge 1 Lozenge Oral five times a day  docusate sodium 100 milliGRAM(s) Oral three times a day  enalapril 2.5 milliGRAM(s) Oral daily  finasteride 5 milliGRAM(s) Oral daily  folic acid 1 milliGRAM(s) Oral daily  heparin  Infusion 339 Unit(s)/Hr (3.39 mL/Hr) IV Continuous <Continuous>  lidocaine/prilocaine Cream 1 Application(s) Topical daily  magnesium oxide 400 milliGRAM(s) Oral three times a day with meals  metoprolol succinate ER 12.5 milliGRAM(s) Oral two times a day  multivitamin 1 Tablet(s) Oral daily  mycophenolate mofetil 1000 milliGRAM(s) Oral <User Schedule>  pantoprazole    Tablet 40 milliGRAM(s) Oral before breakfast  senna 2 Tablet(s) Oral at bedtime  sertraline 50 milliGRAM(s) Oral at bedtime  sodium bicarbonate Mouth Rinse 10 milliLiter(s) Swish and Spit five times a day  sodium chloride 0.9%. 1000 milliLiter(s) (20 mL/Hr) IV Continuous <Continuous>  tacrolimus 1.5 milliGRAM(s) Oral two times a day  tamsulosin 0.4 milliGRAM(s) Oral at bedtime  ursodiol Capsule 300 milliGRAM(s) Oral two times a day with meals  voriconazole 200 milliGRAM(s) Oral every 12 hours    MEDICATIONS  (PRN):  acetaminophen   Tablet .. 650 milliGRAM(s) Oral every 6 hours PRN Temp greater or equal to 38C (100.4F), Mild Pain (1 - 3)  acetaminophen  IVPB .. 1000 milliGRAM(s) IV Intermittent once PRN Temp greater or equal to 38.5C (101.3F)  artificial tears (preservative free) Ophthalmic Solution 2 Drop(s) Both EYES two times a day PRN Dry Eyes  diphenhydrAMINE   Injectable 25 milliGRAM(s) IV Push every 4 hours PRN Allergy symptoms  metoclopramide Injectable 10 milliGRAM(s) IV Push every 6 hours PRN Nausea and/or Vomiting  ondansetron Injectable 8 milliGRAM(s) IV Push every 8 hours PRN Nausea and/or Vomiting  sodium chloride 0.9% lock flush 10 milliLiter(s) IV Push every 1 hour PRN Pre/post blood products, medications, blood draw, and to maintain line patency      Allergies    No Known Allergies    Intolerances            Vital Signs Last 24 Hrs  T(C): 36.6 (30 Oct 2019 09:28), Max: 36.8 (29 Oct 2019 21:37)  T(F): 97.8 (30 Oct 2019 09:28), Max: 98.3 (29 Oct 2019 21:37)  HR: 84 (30 Oct 2019 09:28) (84 - 92)  BP: 118/71 (30 Oct 2019 09:28) (101/51 - 134/76)  BP(mean): --  RR: 18 (30 Oct 2019 09:28) (18 - 18)  SpO2: 94% (30 Oct 2019 09:28) (94% - 98%)    I&O's Summary    29 Oct 2019 07:01  -  30 Oct 2019 07:00  --------------------------------------------------------  IN: 1165 mL / OUT: 550 mL / NET: 615 mL    30 Oct 2019 07:01  -  30 Oct 2019 10:07  --------------------------------------------------------  IN: 0 mL / OUT: 0 mL / NET: 0 mL          PHYSICAL EXAM:    Constitutional: NAD, awake    HEENT: Moist Mucous Membranes, Anicteric  Pulmonary: Non-labored, breath sounds are clear bilaterally, No wheezing, rales or rhonchi  Cardiovascular: Regular, S1 and S2, No murmurs, rubs, gallops or clicks  Gastrointestinal: Bowel Sounds present, soft, nontender.   Lymph: No peripheral edema. No lymphadenopathy.  Skin: No visible rashes or ulcers.  Psych:  Mood & affect appropriate for situation    LABS: All Labs Reviewed:                        7.3    7.72  )-----------( 11       ( 30 Oct 2019 07:00 )             21.7                         7.4    5.08  )-----------( 13       ( 29 Oct 2019 07:01 )             22.6                         7.3    3.15  )-----------( 18       ( 28 Oct 2019 06:50 )             22.0     30 Oct 2019 07:00    140    |  105    |  14     ----------------------------<  107    4.7     |  23     |  0.97   29 Oct 2019 07:01    136    |  101    |  14     ----------------------------<  99     4.5     |  23     |  0.97   28 Oct 2019 06:49    140    |  104    |  14     ----------------------------<  106    4.3     |  24     |  0.91     Ca    9.2        30 Oct 2019 07:00  Ca    9.3        29 Oct 2019 07:01  Ca    8.7        28 Oct 2019 06:49  Phos  3.9       30 Oct 2019 07:00  Phos  4.1       29 Oct 2019 07:01  Phos  4.1       28 Oct 2019 06:49  Mg     1.7       30 Oct 2019 07:00  Mg     1.5       29 Oct 2019 07:01  Mg     1.7       28 Oct 2019 06:49    TPro  5.6    /  Alb  3.4    /  TBili  0.2    /  DBili  <0.1   /  AST  8      /  ALT  7      /  AlkPhos  88     30 Oct 2019 07:00  TPro  5.6    /  Alb  3.4    /  TBili  0.2    /  DBili  x      /  AST  7      /  ALT  7      /  AlkPhos  83     29 Oct 2019 07:01  TPro  5.7    /  Alb  3.4    /  TBili  0.2    /  DBili  <0.1   /  AST  6      /  ALT  6      /  AlkPhos  76     28 Oct 2019 06:49

## 2019-10-30 NOTE — PROGRESS NOTE ADULT - ASSESSMENT
59 year old male, with previously diagnosed acute myeloid leukemia. s/p induction chemotherapy with Daunorubicin/Cytarabine and HIDAC consolidation chemotherapy, now admitted for Haplo-identical stem cell transplant from son:  Cardiac MRI in 9/2019 demonstrated LVEF 24%, with Mid wall myocardial late gadolinium enhancement at the base involving the anteroseptal and inferoseptal wall.    - No evidence of volume overload at present.  He does have some basilar rhonchi today. He is on fluids and has been getting IV Lasix prn to maintain net negative.  His recent CXR is clear.   - Continue to maintain strict I's and O's, and to monitor for signs of volume overload, which he is at risk for.    - No evidence of acute ischemia  - BP is overall stable  - Continue ACEI and BB at current doses as tolerated by BP.    - On heparin gtt for hx of dvt/pe and hypercoagulable state, and adjust rate per protocol. Monitor platelet count, 11 on 10/30 No bleeding, though is clearly at risk.  - Monitor and replete lytes  - To follow while admitted

## 2019-10-31 ENCOUNTER — TRANSCRIPTION ENCOUNTER (OUTPATIENT)
Age: 59
End: 2019-10-31

## 2019-10-31 LAB
ALBUMIN SERPL ELPH-MCNC: 3.3 G/DL — SIGNIFICANT CHANGE UP (ref 3.3–5)
ALP SERPL-CCNC: 87 U/L — SIGNIFICANT CHANGE UP (ref 40–120)
ALT FLD-CCNC: 7 U/L — LOW (ref 10–45)
ANION GAP SERPL CALC-SCNC: 14 MMOL/L — SIGNIFICANT CHANGE UP (ref 5–17)
APTT BLD: 29.2 SEC — SIGNIFICANT CHANGE UP (ref 27.5–36.3)
AST SERPL-CCNC: 6 U/L — LOW (ref 10–40)
BASOPHILS # BLD AUTO: 0 K/UL — SIGNIFICANT CHANGE UP (ref 0–0.2)
BASOPHILS NFR BLD AUTO: 0 % — SIGNIFICANT CHANGE UP (ref 0–2)
BILIRUB SERPL-MCNC: 0.2 MG/DL — SIGNIFICANT CHANGE UP (ref 0.2–1.2)
BUN SERPL-MCNC: 13 MG/DL — SIGNIFICANT CHANGE UP (ref 7–23)
CALCIUM SERPL-MCNC: 9.2 MG/DL — SIGNIFICANT CHANGE UP (ref 8.4–10.5)
CHLORIDE SERPL-SCNC: 98 MMOL/L — SIGNIFICANT CHANGE UP (ref 96–108)
CMV DNA CSF QL NAA+PROBE: SIGNIFICANT CHANGE UP
CMV DNA SPEC NAA+PROBE-LOG#: SIGNIFICANT CHANGE UP LOGIU/ML
CO2 SERPL-SCNC: 26 MMOL/L — SIGNIFICANT CHANGE UP (ref 22–31)
CREAT SERPL-MCNC: 1 MG/DL — SIGNIFICANT CHANGE UP (ref 0.5–1.3)
EOSINOPHIL # BLD AUTO: 0 K/UL — SIGNIFICANT CHANGE UP (ref 0–0.5)
EOSINOPHIL NFR BLD AUTO: 0 % — SIGNIFICANT CHANGE UP (ref 0–6)
FIBRINOGEN PPP-MCNC: 551 MG/DL — HIGH (ref 350–510)
GLUCOSE SERPL-MCNC: 100 MG/DL — HIGH (ref 70–99)
HCT VFR BLD CALC: 21.6 % — LOW (ref 39–50)
HGB BLD-MCNC: 7.3 G/DL — LOW (ref 13–17)
INR BLD: 1.11 RATIO — SIGNIFICANT CHANGE UP (ref 0.88–1.16)
LDH SERPL L TO P-CCNC: 120 U/L — SIGNIFICANT CHANGE UP (ref 50–242)
LYMPHOCYTES # BLD AUTO: 0.24 K/UL — LOW (ref 1–3.3)
LYMPHOCYTES # BLD AUTO: 4 % — LOW (ref 13–44)
MAGNESIUM SERPL-MCNC: 1.5 MG/DL — LOW (ref 1.6–2.6)
MANUAL SMEAR VERIFICATION: SIGNIFICANT CHANGE UP
MCHC RBC-ENTMCNC: 30 PG — SIGNIFICANT CHANGE UP (ref 27–34)
MCHC RBC-ENTMCNC: 33.8 GM/DL — SIGNIFICANT CHANGE UP (ref 32–36)
MCV RBC AUTO: 88.9 FL — SIGNIFICANT CHANGE UP (ref 80–100)
METAMYELOCYTES # FLD: 1 % — HIGH (ref 0–0)
MONOCYTES # BLD AUTO: 0.95 K/UL — HIGH (ref 0–0.9)
MONOCYTES NFR BLD AUTO: 16 % — HIGH (ref 2–14)
NEUTROPHILS # BLD AUTO: 4.68 K/UL — SIGNIFICANT CHANGE UP (ref 1.8–7.4)
NEUTROPHILS NFR BLD AUTO: 75 % — SIGNIFICANT CHANGE UP (ref 43–77)
NEUTS BAND # BLD: 4 % — SIGNIFICANT CHANGE UP (ref 0–8)
NRBC # BLD: 0 /100 — SIGNIFICANT CHANGE UP (ref 0–0)
PHOSPHATE SERPL-MCNC: 4.2 MG/DL — SIGNIFICANT CHANGE UP (ref 2.5–4.5)
PLAT MORPH BLD: NORMAL — SIGNIFICANT CHANGE UP
PLATELET # BLD AUTO: 10 K/UL — CRITICAL LOW (ref 150–400)
POTASSIUM SERPL-MCNC: 4.5 MMOL/L — SIGNIFICANT CHANGE UP (ref 3.5–5.3)
POTASSIUM SERPL-SCNC: 4.5 MMOL/L — SIGNIFICANT CHANGE UP (ref 3.5–5.3)
PROT SERPL-MCNC: 5.6 G/DL — LOW (ref 6–8.3)
PROTHROM AB SERPL-ACNC: 12.8 SEC — SIGNIFICANT CHANGE UP (ref 10–12.9)
RBC # BLD: 2.43 M/UL — LOW (ref 4.2–5.8)
RBC # FLD: 16.7 % — HIGH (ref 10.3–14.5)
RBC BLD AUTO: SIGNIFICANT CHANGE UP
SODIUM SERPL-SCNC: 138 MMOL/L — SIGNIFICANT CHANGE UP (ref 135–145)
TACROLIMUS SERPL-MCNC: 9.7 NG/ML — SIGNIFICANT CHANGE UP
WBC # BLD: 5.92 K/UL — SIGNIFICANT CHANGE UP (ref 3.8–10.5)
WBC # FLD AUTO: 5.92 K/UL — SIGNIFICANT CHANGE UP (ref 3.8–10.5)

## 2019-10-31 PROCEDURE — 80285 DRUG ASSAY VORICONAZOLE: CPT

## 2019-10-31 PROCEDURE — 87040 BLOOD CULTURE FOR BACTERIA: CPT

## 2019-10-31 PROCEDURE — 97161 PT EVAL LOW COMPLEX 20 MIN: CPT

## 2019-10-31 PROCEDURE — 86900 BLOOD TYPING SEROLOGIC ABO: CPT

## 2019-10-31 PROCEDURE — A9560: CPT

## 2019-10-31 PROCEDURE — 71045 X-RAY EXAM CHEST 1 VIEW: CPT

## 2019-10-31 PROCEDURE — 85610 PROTHROMBIN TIME: CPT

## 2019-10-31 PROCEDURE — C1751: CPT

## 2019-10-31 PROCEDURE — 78472 GATED HEART PLANAR SINGLE: CPT

## 2019-10-31 PROCEDURE — 85027 COMPLETE CBC AUTOMATED: CPT

## 2019-10-31 PROCEDURE — 80053 COMPREHEN METABOLIC PANEL: CPT

## 2019-10-31 PROCEDURE — 86901 BLOOD TYPING SEROLOGIC RH(D): CPT

## 2019-10-31 PROCEDURE — 87070 CULTURE OTHR SPECIMN AEROBIC: CPT

## 2019-10-31 PROCEDURE — 36569 INSJ PICC 5 YR+ W/O IMAGING: CPT

## 2019-10-31 PROCEDURE — 83615 LACTATE (LD) (LDH) ENZYME: CPT

## 2019-10-31 PROCEDURE — 86850 RBC ANTIBODY SCREEN: CPT

## 2019-10-31 PROCEDURE — 83735 ASSAY OF MAGNESIUM: CPT

## 2019-10-31 PROCEDURE — 84550 ASSAY OF BLOOD/URIC ACID: CPT

## 2019-10-31 PROCEDURE — 87075 CULTR BACTERIA EXCEPT BLOOD: CPT

## 2019-10-31 PROCEDURE — 88305 TISSUE EXAM BY PATHOLOGIST: CPT

## 2019-10-31 PROCEDURE — 70220 X-RAY EXAM OF SINUSES: CPT

## 2019-10-31 PROCEDURE — 86140 C-REACTIVE PROTEIN: CPT

## 2019-10-31 PROCEDURE — 93005 ELECTROCARDIOGRAM TRACING: CPT

## 2019-10-31 PROCEDURE — 88311 DECALCIFY TISSUE: CPT

## 2019-10-31 PROCEDURE — 73718 MRI LOWER EXTREMITY W/O DYE: CPT

## 2019-10-31 PROCEDURE — P9037: CPT

## 2019-10-31 PROCEDURE — 87205 SMEAR GRAM STAIN: CPT

## 2019-10-31 PROCEDURE — 84156 ASSAY OF PROTEIN URINE: CPT

## 2019-10-31 PROCEDURE — 81001 URINALYSIS AUTO W/SCOPE: CPT

## 2019-10-31 PROCEDURE — 73630 X-RAY EXAM OF FOOT: CPT

## 2019-10-31 PROCEDURE — 88307 TISSUE EXAM BY PATHOLOGIST: CPT

## 2019-10-31 PROCEDURE — 99232 SBSQ HOSP IP/OBS MODERATE 35: CPT

## 2019-10-31 PROCEDURE — 36430 TRANSFUSION BLD/BLD COMPNT: CPT

## 2019-10-31 PROCEDURE — 84100 ASSAY OF PHOSPHORUS: CPT

## 2019-10-31 PROCEDURE — 85730 THROMBOPLASTIN TIME PARTIAL: CPT

## 2019-10-31 PROCEDURE — 86803 HEPATITIS C AB TEST: CPT

## 2019-10-31 PROCEDURE — 85652 RBC SED RATE AUTOMATED: CPT

## 2019-10-31 PROCEDURE — 87086 URINE CULTURE/COLONY COUNT: CPT

## 2019-10-31 PROCEDURE — 99233 SBSQ HOSP IP/OBS HIGH 50: CPT | Mod: GC

## 2019-10-31 RX ORDER — MAGNESIUM SULFATE 500 MG/ML
2 VIAL (ML) INJECTION ONCE
Refills: 0 | Status: COMPLETED | OUTPATIENT
Start: 2019-10-31 | End: 2019-10-31

## 2019-10-31 RX ORDER — ALPRAZOLAM 0.25 MG
0.5 TABLET ORAL
Qty: 3 | Refills: 0
Start: 2019-10-31 | End: 2019-11-02

## 2019-10-31 RX ORDER — TAMSULOSIN HYDROCHLORIDE 0.4 MG/1
1 CAPSULE ORAL
Qty: 30 | Refills: 0
Start: 2019-10-31 | End: 2019-11-29

## 2019-10-31 RX ORDER — METOPROLOL TARTRATE 50 MG
0.5 TABLET ORAL
Qty: 30 | Refills: 0
Start: 2019-10-31 | End: 2019-11-29

## 2019-10-31 RX ORDER — FINASTERIDE 5 MG/1
1 TABLET, FILM COATED ORAL
Qty: 30 | Refills: 0
Start: 2019-10-31 | End: 2019-11-29

## 2019-10-31 RX ORDER — SERTRALINE 25 MG/1
1 TABLET, FILM COATED ORAL
Qty: 30 | Refills: 0
Start: 2019-10-31 | End: 2019-11-29

## 2019-10-31 RX ADMIN — VORICONAZOLE 200 MILLIGRAM(S): 10 INJECTION, POWDER, LYOPHILIZED, FOR SOLUTION INTRAVENOUS at 06:33

## 2019-10-31 RX ADMIN — Medication 5 MILLILITER(S): at 20:11

## 2019-10-31 RX ADMIN — Medication 10 MILLILITER(S): at 12:02

## 2019-10-31 RX ADMIN — Medication 1 TABLET(S): at 13:04

## 2019-10-31 RX ADMIN — SERTRALINE 50 MILLIGRAM(S): 25 TABLET, FILM COATED ORAL at 21:17

## 2019-10-31 RX ADMIN — Medication 5 MILLILITER(S): at 16:37

## 2019-10-31 RX ADMIN — MYCOPHENOLATE MOFETIL 1000 MILLIGRAM(S): 250 CAPSULE ORAL at 21:17

## 2019-10-31 RX ADMIN — MYCOPHENOLATE MOFETIL 1000 MILLIGRAM(S): 250 CAPSULE ORAL at 13:29

## 2019-10-31 RX ADMIN — Medication 100 MILLIGRAM(S): at 13:05

## 2019-10-31 RX ADMIN — CHLORHEXIDINE GLUCONATE 1 APPLICATION(S): 213 SOLUTION TOPICAL at 12:01

## 2019-10-31 RX ADMIN — Medication 12.5 MILLIGRAM(S): at 06:33

## 2019-10-31 RX ADMIN — Medication 10 MILLILITER(S): at 20:11

## 2019-10-31 RX ADMIN — Medication 10 MILLILITER(S): at 08:01

## 2019-10-31 RX ADMIN — Medication 400 MILLIGRAM(S): at 21:17

## 2019-10-31 RX ADMIN — FINASTERIDE 5 MILLIGRAM(S): 5 TABLET, FILM COATED ORAL at 13:28

## 2019-10-31 RX ADMIN — MAGNESIUM OXIDE 400 MG ORAL TABLET 400 MILLIGRAM(S): 241.3 TABLET ORAL at 08:00

## 2019-10-31 RX ADMIN — VORICONAZOLE 200 MILLIGRAM(S): 10 INJECTION, POWDER, LYOPHILIZED, FOR SOLUTION INTRAVENOUS at 16:39

## 2019-10-31 RX ADMIN — OXYCODONE HYDROCHLORIDE 5 MILLIGRAM(S): 5 TABLET ORAL at 10:00

## 2019-10-31 RX ADMIN — OXYCODONE HYDROCHLORIDE 5 MILLIGRAM(S): 5 TABLET ORAL at 10:45

## 2019-10-31 RX ADMIN — Medication 12.5 MILLIGRAM(S): at 16:39

## 2019-10-31 RX ADMIN — URSODIOL 300 MILLIGRAM(S): 250 TABLET, FILM COATED ORAL at 08:00

## 2019-10-31 RX ADMIN — MAGNESIUM OXIDE 400 MG ORAL TABLET 400 MILLIGRAM(S): 241.3 TABLET ORAL at 16:38

## 2019-10-31 RX ADMIN — Medication 400 MILLIGRAM(S): at 13:05

## 2019-10-31 RX ADMIN — Medication 5 MILLILITER(S): at 00:34

## 2019-10-31 RX ADMIN — MAGNESIUM OXIDE 400 MG ORAL TABLET 400 MILLIGRAM(S): 241.3 TABLET ORAL at 13:05

## 2019-10-31 RX ADMIN — TAMSULOSIN HYDROCHLORIDE 0.4 MILLIGRAM(S): 0.4 CAPSULE ORAL at 21:17

## 2019-10-31 RX ADMIN — Medication 1 LOZENGE: at 16:38

## 2019-10-31 RX ADMIN — Medication 2.5 MILLIGRAM(S): at 06:34

## 2019-10-31 RX ADMIN — Medication 1 MILLIGRAM(S): at 13:04

## 2019-10-31 RX ADMIN — Medication 1 MILLIGRAM(S): at 21:17

## 2019-10-31 RX ADMIN — Medication 10 MILLILITER(S): at 00:34

## 2019-10-31 RX ADMIN — Medication 1 LOZENGE: at 00:34

## 2019-10-31 RX ADMIN — URSODIOL 300 MILLIGRAM(S): 250 TABLET, FILM COATED ORAL at 16:38

## 2019-10-31 RX ADMIN — Medication 10 MILLILITER(S): at 16:37

## 2019-10-31 RX ADMIN — Medication 5 MILLILITER(S): at 07:59

## 2019-10-31 RX ADMIN — Medication 1 LOZENGE: at 07:59

## 2019-10-31 RX ADMIN — Medication 1 LOZENGE: at 20:11

## 2019-10-31 RX ADMIN — Medication 1 LOZENGE: at 13:04

## 2019-10-31 RX ADMIN — TACROLIMUS 1.5 MILLIGRAM(S): 5 CAPSULE ORAL at 16:40

## 2019-10-31 RX ADMIN — Medication 5 MILLILITER(S): at 13:04

## 2019-10-31 RX ADMIN — PANTOPRAZOLE SODIUM 40 MILLIGRAM(S): 20 TABLET, DELAYED RELEASE ORAL at 06:34

## 2019-10-31 RX ADMIN — Medication 50 GRAM(S): at 08:39

## 2019-10-31 RX ADMIN — Medication 400 MILLIGRAM(S): at 06:34

## 2019-10-31 RX ADMIN — TACROLIMUS 1.5 MILLIGRAM(S): 5 CAPSULE ORAL at 06:34

## 2019-10-31 RX ADMIN — MYCOPHENOLATE MOFETIL 1000 MILLIGRAM(S): 250 CAPSULE ORAL at 08:01

## 2019-10-31 NOTE — PROGRESS NOTE ADULT - ASSESSMENT

## 2019-10-31 NOTE — PROGRESS NOTE ADULT - SUBJECTIVE AND OBJECTIVE BOX
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     S: Patient seen and examined with HPC Transplant Team:     No further loose stools  + taste alterations and poor PO intake  + headache overnight  + continued right hip pain    Denies mouth, throat pain; cough, dyspnea, nausea, vomiting, diarrhea, abdominal pain      Vital Signs Last 24 Hrs  T(C): 36.6 (31 Oct 2019 06:30), Max: 37.2 (30 Oct 2019 21:07)  T(F): 97.9 (31 Oct 2019 06:30), Max: 99 (30 Oct 2019 21:07)  HR: 84 (31 Oct 2019 06:30) (83 - 96)  BP: 104/64 (31 Oct 2019 06:30) (102/60 - 120/72)  BP(mean): --  RR: 18 (31 Oct 2019 06:30) (18 - 18)  SpO2: 95% (31 Oct 2019 06:30) (94% - 99%)    Admit weight: 81.5 kg  Daily weight:     I&O's Summary    30 Oct 2019 07:01  -  31 Oct 2019 07:00  --------------------------------------------------------  IN: 1365 mL / OUT: 1000 mL / NET: 365 mL    PE:    Oropharynx: no ulceration or erythema  Oral Mucositis:   -                                             Grade: -  CVS: RRR, normal S1 S2  Lungs: CTA bilaterally   Abdomen: soft, + normoactive BS, NT, ND  Extremities: warm, no edema in BLE . Toe amputation  Gastric Mucositis:     -                                             Grade: -  Intestinal Mucositis:      -                                        Grade: -  Skin: patchy erythematous rash back and upper chest stable  TLC:  C/D/I  Neuro: Alert and oriented x 3  Pain: none    LABS:                        7.3    5.92  )-----------( 10       ( 31 Oct 2019 06:59 )             21.6     10-31    138  |  98  |  13  ----------------------------<  100<H>  4.5   |  26  |  1.00    Ca    9.2      31 Oct 2019 06:59  Phos  4.2     10-31  Mg     1.5     10-31    TPro  5.6<L>  /  Alb  3.3  /  TBili  0.2  /  DBili  x   /  AST  6<L>  /  ALT  7<L>  /  AlkPhos  87  10-31    CAPILLARY BLOOD GLUCOSE    PT/INR - ( 31 Oct 2019 06:59 )   PT: 12.8 sec;   INR: 1.11 ratio      PTT - ( 31 Oct 2019 06:59 )  PTT:29.2 sec    MEDICATIONS  (STANDING):  acetaminophen   Tablet .. 650 milliGRAM(s) Oral every 6 hours  acetaminophen   Tablet .. 650 milliGRAM(s) Oral <User Schedule>  acyclovir   Oral Tab/Cap 400 milliGRAM(s) Oral every 8 hours  ALPRAZolam 1 milliGRAM(s) Oral at bedtime  Biotene Dry Mouth Oral Rinse 5 milliLiter(s) Swish and Spit five times a day  chlorhexidine 4% Liquid 1 Application(s) Topical <User Schedule>  clotrimazole Lozenge 1 Lozenge Oral five times a day  docusate sodium 100 milliGRAM(s) Oral three times a day  enalapril 2.5 milliGRAM(s) Oral daily  finasteride 5 milliGRAM(s) Oral daily  folic acid 1 milliGRAM(s) Oral daily  heparin  Infusion 339 Unit(s)/Hr (3.39 mL/Hr) IV Continuous <Continuous>  lidocaine/prilocaine Cream 1 Application(s) Topical daily  magnesium oxide 400 milliGRAM(s) Oral three times a day with meals  metoprolol succinate ER 12.5 milliGRAM(s) Oral two times a day  multivitamin 1 Tablet(s) Oral daily  mycophenolate mofetil 1000 milliGRAM(s) Oral <User Schedule>  pantoprazole    Tablet 40 milliGRAM(s) Oral before breakfast  senna 2 Tablet(s) Oral at bedtime  sertraline 50 milliGRAM(s) Oral at bedtime  sodium bicarbonate Mouth Rinse 10 milliLiter(s) Swish and Spit five times a day  sodium chloride 0.9%. 1000 milliLiter(s) (20 mL/Hr) IV Continuous <Continuous>  tacrolimus 1.5 milliGRAM(s) Oral two times a day  tamsulosin 0.4 milliGRAM(s) Oral at bedtime  ursodiol Capsule 300 milliGRAM(s) Oral two times a day with meals  voriconazole 200 milliGRAM(s) Oral every 12 hours    MEDICATIONS  (PRN):  acetaminophen   Tablet .. 650 milliGRAM(s) Oral every 6 hours PRN Temp greater or equal to 38C (100.4F), Mild Pain (1 - 3)  acetaminophen  IVPB .. 1000 milliGRAM(s) IV Intermittent once PRN Temp greater or equal to 38.5C (101.3F)  artificial tears (preservative free) Ophthalmic Solution 2 Drop(s) Both EYES two times a day PRN Dry Eyes  diphenhydrAMINE   Injectable 25 milliGRAM(s) IV Push every 4 hours PRN Allergy symptoms  metoclopramide Injectable 10 milliGRAM(s) IV Push every 6 hours PRN Nausea and/or Vomiting  ondansetron Injectable 8 milliGRAM(s) IV Push every 8 hours PRN Nausea and/or Vomiting  oxyCODONE    IR 5 milliGRAM(s) Oral every 6 hours PRN Moderate Pain (4 - 6)  sodium chloride 0.9% lock flush 10 milliLiter(s) IV Push every 1 hour PRN Pre/post blood products, medications, blood draw, and to maintain line patency    A/P:   59 year old male with a history of AML  Post Allogeneic PBSCT day +22  Strict I&O, daily weights, prn diuresis   Cardiology following   10/14- Started Tacro, Cellcept and Zarxio  Afebrile, neutropenic - continue Cefepime.  10/11- Patient will need split units of  PRBC if need to be transfused due to low EF.  10/23-  Platelets transfusion reaction yesterday, transfusion workup completed.  10/27- Engrafting.  Poor PO intake, continue calorie count for 48 hours.  Zarxio discontinued after dose on 10/29.    1. Infectious Disease:   acyclovir   Oral Tab/Cap 400 milliGRAM(s) Oral every 8 hours  clotrimazole Lozenge 1 Lozenge Oral five times a day  voriconazole 200 milliGRAM(s) Oral every 12 hours    2. VOD Prophylaxis: Actigall, Glutamine, Heparin (dosed at 100 units / kg / day)     3. GI Prophylaxis:    pantoprazole    Tablet 40 milliGRAM(s) Oral before breakfast    4. Mouth care - NS / NaHCO3 rinses, Mycelex, Biotene; Skin care     5. GVHD prophylaxis   mycophenolate mofetil 1000 milliGRAM(s) Oral <User Schedule>  tacrolimus 1.5 milliGRAM(s) Oral two times a day  CTX days + 3, + 4     6. Transfuse & replete electrolytes prn   magnesium oxide 400 milliGRAM(s) Oral three times a day with meals  Magnesium Sulfate 2G IV once    7. IV hydration, daily weights, strict I&O, prn diuresis     8. PO intake as tolerated, nutrition follow up as needed, MVI, folic acid     9. Antiemetics, anti-diarrhea medications:   metoclopramide Injectable 10 milliGRAM(s) IV Push every 6 hours PRN  ondansetron Injectable 8 milliGRAM(s) IV Push every 8 hours PRN    10. OOB as tolerated, physical therapy consult if needed     11. Monitor coags / fibrinogen 2x week, vitamin K as needed     12. Monitor closely for clinical changes, monitor for fevers     13. Emotional support provided, plan of care discussed and questions addressed     14. Patient education done regarding chemotherapy prep, plan of care, restrictions and discharge planning     15. Continue regular social work input      I have written the above note for Dr. Quiñones who performed service with me in the room.   Ashley George, ANP-BC ([phone])    I have seen and examined patient with NP, I agree with above note as scribed. 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     S: Patient seen and examined with HPC Transplant Team:     + loose stool once per day  + taste alterations and poor PO intake  + minor headache and bone pain hips, responsive to pain medication    Denies mouth, throat pain; cough, dyspnea, nausea, vomiting, diarrhea, abdominal pain      Vital Signs Last 24 Hrs  T(C): 36.6 (31 Oct 2019 06:30), Max: 37.2 (30 Oct 2019 21:07)  T(F): 97.9 (31 Oct 2019 06:30), Max: 99 (30 Oct 2019 21:07)  HR: 84 (31 Oct 2019 06:30) (83 - 96)  BP: 104/64 (31 Oct 2019 06:30) (102/60 - 120/72)  BP(mean): --  RR: 18 (31 Oct 2019 06:30) (18 - 18)  SpO2: 95% (31 Oct 2019 06:30) (94% - 99%)    Admit weight: 81.5 kg  Daily weight: 77.4 kg    I&O's Summary    30 Oct 2019 07:01  -  31 Oct 2019 07:00  --------------------------------------------------------  IN: 1365 mL / OUT: 1000 mL / NET: 365 mL    PE:    Oropharynx: no ulceration or erythema  Oral Mucositis:   -                                             Grade: -  CVS: RRR, normal S1 S2  Lungs: CTA bilaterally   Abdomen: soft, + normoactive BS, NT, ND  Extremities: warm, no edema in BLE . Toe amputation  Gastric Mucositis:     -                                             Grade: -  Intestinal Mucositis:      -                                        Grade: -  Skin: patchy erythematous rash back and upper chest stable  TLC:  C/D/I  Neuro: Alert and oriented x 3  Pain: none    LABS:                        7.3    5.92  )-----------( 10       ( 31 Oct 2019 06:59 )             21.6     10-31    138  |  98  |  13  ----------------------------<  100<H>  4.5   |  26  |  1.00    Ca    9.2      31 Oct 2019 06:59  Phos  4.2     10-31  Mg     1.5     10-31    TPro  5.6<L>  /  Alb  3.3  /  TBili  0.2  /  DBili  x   /  AST  6<L>  /  ALT  7<L>  /  AlkPhos  87  10-31    CAPILLARY BLOOD GLUCOSE    PT/INR - ( 31 Oct 2019 06:59 )   PT: 12.8 sec;   INR: 1.11 ratio      PTT - ( 31 Oct 2019 06:59 )  PTT:29.2 sec    MEDICATIONS  (STANDING):  acetaminophen   Tablet .. 650 milliGRAM(s) Oral every 6 hours  acetaminophen   Tablet .. 650 milliGRAM(s) Oral <User Schedule>  acyclovir   Oral Tab/Cap 400 milliGRAM(s) Oral every 8 hours  ALPRAZolam 1 milliGRAM(s) Oral at bedtime  Biotene Dry Mouth Oral Rinse 5 milliLiter(s) Swish and Spit five times a day  chlorhexidine 4% Liquid 1 Application(s) Topical <User Schedule>  clotrimazole Lozenge 1 Lozenge Oral five times a day  docusate sodium 100 milliGRAM(s) Oral three times a day  enalapril 2.5 milliGRAM(s) Oral daily  finasteride 5 milliGRAM(s) Oral daily  folic acid 1 milliGRAM(s) Oral daily  heparin  Infusion 339 Unit(s)/Hr (3.39 mL/Hr) IV Continuous <Continuous>  lidocaine/prilocaine Cream 1 Application(s) Topical daily  magnesium oxide 400 milliGRAM(s) Oral three times a day with meals  metoprolol succinate ER 12.5 milliGRAM(s) Oral two times a day  multivitamin 1 Tablet(s) Oral daily  mycophenolate mofetil 1000 milliGRAM(s) Oral <User Schedule>  pantoprazole    Tablet 40 milliGRAM(s) Oral before breakfast  senna 2 Tablet(s) Oral at bedtime  sertraline 50 milliGRAM(s) Oral at bedtime  sodium bicarbonate Mouth Rinse 10 milliLiter(s) Swish and Spit five times a day  sodium chloride 0.9%. 1000 milliLiter(s) (20 mL/Hr) IV Continuous <Continuous>  tacrolimus 1.5 milliGRAM(s) Oral two times a day  tamsulosin 0.4 milliGRAM(s) Oral at bedtime  ursodiol Capsule 300 milliGRAM(s) Oral two times a day with meals  voriconazole 200 milliGRAM(s) Oral every 12 hours    MEDICATIONS  (PRN):  acetaminophen   Tablet .. 650 milliGRAM(s) Oral every 6 hours PRN Temp greater or equal to 38C (100.4F), Mild Pain (1 - 3)  acetaminophen  IVPB .. 1000 milliGRAM(s) IV Intermittent once PRN Temp greater or equal to 38.5C (101.3F)  artificial tears (preservative free) Ophthalmic Solution 2 Drop(s) Both EYES two times a day PRN Dry Eyes  diphenhydrAMINE   Injectable 25 milliGRAM(s) IV Push every 4 hours PRN Allergy symptoms  metoclopramide Injectable 10 milliGRAM(s) IV Push every 6 hours PRN Nausea and/or Vomiting  ondansetron Injectable 8 milliGRAM(s) IV Push every 8 hours PRN Nausea and/or Vomiting  oxyCODONE    IR 5 milliGRAM(s) Oral every 6 hours PRN Moderate Pain (4 - 6)  sodium chloride 0.9% lock flush 10 milliLiter(s) IV Push every 1 hour PRN Pre/post blood products, medications, blood draw, and to maintain line patency    A/P:   59 year old male with a history of AML  Post Allogeneic PBSCT day +22  Strict I&O, daily weights, prn diuresis   Cardiology following   10/14- Started Tacro, Cellcept and Zarxio  Afebrile, neutropenic - continue Cefepime.  10/11- Patient will need split units of  PRBC if need to be transfused due to low EF.  10/23-  Platelets transfusion reaction yesterday, transfusion workup completed.  10/27- Engrafting.  Poor PO intake, continue calorie count for 48 hours.  Zarxio discontinued after dose on 10/29.    1. Infectious Disease:   acyclovir   Oral Tab/Cap 400 milliGRAM(s) Oral every 8 hours  clotrimazole Lozenge 1 Lozenge Oral five times a day  voriconazole 200 milliGRAM(s) Oral every 12 hours    2. VOD Prophylaxis: Actigall, Glutamine, Heparin (dosed at 100 units / kg / day)     3. GI Prophylaxis:    pantoprazole    Tablet 40 milliGRAM(s) Oral before breakfast    4. Mouth care - NS / NaHCO3 rinses, Mycelex, Biotene; Skin care     5. GVHD prophylaxis   mycophenolate mofetil 1000 milliGRAM(s) Oral <User Schedule>  tacrolimus 1.5 milliGRAM(s) Oral two times a day  CTX days + 3, + 4     6. Transfuse & replete electrolytes prn   magnesium oxide 400 milliGRAM(s) Oral three times a day with meals  Magnesium Sulfate 2G IV once  Transfuse 1U PRBCs    7. IV hydration, daily weights, strict I&O, prn diuresis     8. PO intake as tolerated, nutrition follow up as needed, MVI, folic acid     9. Antiemetics, anti-diarrhea medications:   metoclopramide Injectable 10 milliGRAM(s) IV Push every 6 hours PRN  ondansetron Injectable 8 milliGRAM(s) IV Push every 8 hours PRN    10. OOB as tolerated, physical therapy consult if needed     11. Monitor coags / fibrinogen 2x week, vitamin K as needed     12. Monitor closely for clinical changes, monitor for fevers     13. Emotional support provided, plan of care discussed and questions addressed     14. Patient education done regarding chemotherapy prep, plan of care, restrictions and discharge planning     15. Continue regular social work input      I have written the above note for Dr. Quiñones who performed service with me in the room.   Ashley George, ANP-BC ([phone])    I have seen and examined patient with NP, I agree with above note as scribed. 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     S: Patient seen and examined with HPC Transplant Team:     + loose stool once per day  + taste alterations and poor PO intake  + minor headache and bone pain hips, responsive to pain medication    Denies mouth, throat pain; cough, dyspnea, nausea, vomiting, diarrhea, abdominal pain      Vital Signs Last 24 Hrs  T(C): 36.6 (31 Oct 2019 06:30), Max: 37.2 (30 Oct 2019 21:07)  T(F): 97.9 (31 Oct 2019 06:30), Max: 99 (30 Oct 2019 21:07)  HR: 84 (31 Oct 2019 06:30) (83 - 96)  BP: 104/64 (31 Oct 2019 06:30) (102/60 - 120/72)  BP(mean): --  RR: 18 (31 Oct 2019 06:30) (18 - 18)  SpO2: 95% (31 Oct 2019 06:30) (94% - 99%)    Admit weight: 81.5 kg  Daily weight: 77.4 kg    I&O's Summary    30 Oct 2019 07:01  -  31 Oct 2019 07:00  --------------------------------------------------------  IN: 1365 mL / OUT: 1000 mL / NET: 365 mL    PE:    Oropharynx: no ulceration or erythema  Oral Mucositis:   -                                             Grade: -  CVS: RRR, normal S1 S2  Lungs: CTA bilaterally   Abdomen: soft, + normoactive BS, NT, ND  Extremities: warm, no edema in BLE . Toe amputation  Gastric Mucositis:     -                                             Grade: -  Intestinal Mucositis:      -                                        Grade: -  Skin: patchy erythematous rash back and upper chest stable; no new rash  TLC:  C/D/I  Neuro: Alert and oriented x 3  Pain: none    LABS:                        7.3    5.92  )-----------( 10       ( 31 Oct 2019 06:59 )             21.6     10-31    138  |  98  |  13  ----------------------------<  100<H>  4.5   |  26  |  1.00    Ca    9.2      31 Oct 2019 06:59  Phos  4.2     10-31  Mg     1.5     10-31    TPro  5.6<L>  /  Alb  3.3  /  TBili  0.2  /  DBili  x   /  AST  6<L>  /  ALT  7<L>  /  AlkPhos  87  10-31    CAPILLARY BLOOD GLUCOSE    PT/INR - ( 31 Oct 2019 06:59 )   PT: 12.8 sec;   INR: 1.11 ratio      PTT - ( 31 Oct 2019 06:59 )  PTT:29.2 sec    MEDICATIONS  (STANDING):  acetaminophen   Tablet .. 650 milliGRAM(s) Oral every 6 hours  acetaminophen   Tablet .. 650 milliGRAM(s) Oral <User Schedule>  acyclovir   Oral Tab/Cap 400 milliGRAM(s) Oral every 8 hours  ALPRAZolam 1 milliGRAM(s) Oral at bedtime  Biotene Dry Mouth Oral Rinse 5 milliLiter(s) Swish and Spit five times a day  chlorhexidine 4% Liquid 1 Application(s) Topical <User Schedule>  clotrimazole Lozenge 1 Lozenge Oral five times a day  docusate sodium 100 milliGRAM(s) Oral three times a day  enalapril 2.5 milliGRAM(s) Oral daily  finasteride 5 milliGRAM(s) Oral daily  folic acid 1 milliGRAM(s) Oral daily  heparin  Infusion 339 Unit(s)/Hr (3.39 mL/Hr) IV Continuous <Continuous>  lidocaine/prilocaine Cream 1 Application(s) Topical daily  magnesium oxide 400 milliGRAM(s) Oral three times a day with meals  metoprolol succinate ER 12.5 milliGRAM(s) Oral two times a day  multivitamin 1 Tablet(s) Oral daily  mycophenolate mofetil 1000 milliGRAM(s) Oral <User Schedule>  pantoprazole    Tablet 40 milliGRAM(s) Oral before breakfast  senna 2 Tablet(s) Oral at bedtime  sertraline 50 milliGRAM(s) Oral at bedtime  sodium bicarbonate Mouth Rinse 10 milliLiter(s) Swish and Spit five times a day  sodium chloride 0.9%. 1000 milliLiter(s) (20 mL/Hr) IV Continuous <Continuous>  tacrolimus 1.5 milliGRAM(s) Oral two times a day  tamsulosin 0.4 milliGRAM(s) Oral at bedtime  ursodiol Capsule 300 milliGRAM(s) Oral two times a day with meals  voriconazole 200 milliGRAM(s) Oral every 12 hours    MEDICATIONS  (PRN):  acetaminophen   Tablet .. 650 milliGRAM(s) Oral every 6 hours PRN Temp greater or equal to 38C (100.4F), Mild Pain (1 - 3)  acetaminophen  IVPB .. 1000 milliGRAM(s) IV Intermittent once PRN Temp greater or equal to 38.5C (101.3F)  artificial tears (preservative free) Ophthalmic Solution 2 Drop(s) Both EYES two times a day PRN Dry Eyes  diphenhydrAMINE   Injectable 25 milliGRAM(s) IV Push every 4 hours PRN Allergy symptoms  metoclopramide Injectable 10 milliGRAM(s) IV Push every 6 hours PRN Nausea and/or Vomiting  ondansetron Injectable 8 milliGRAM(s) IV Push every 8 hours PRN Nausea and/or Vomiting  oxyCODONE    IR 5 milliGRAM(s) Oral every 6 hours PRN Moderate Pain (4 - 6)  sodium chloride 0.9% lock flush 10 milliLiter(s) IV Push every 1 hour PRN Pre/post blood products, medications, blood draw, and to maintain line patency    A/P:   59 year old male with a history of AML  Post Allogeneic PBSCT day +22  Strict I&O, daily weights, prn diuresis   Cardiology following   10/14- Started Tacro, Cellcept and Zarxio  Afebrile, neutropenic - continue Cefepime.  10/11- Patient will need split units of  PRBC if need to be transfused due to low EF.  10/23-  Platelets transfusion reaction yesterday, transfusion workup completed.  10/27- Engrafting.  Poor PO intake, continue calorie count for 48 hours.  Zarxio discontinued after dose on 10/29.    1. Infectious Disease:   acyclovir   Oral Tab/Cap 400 milliGRAM(s) Oral every 8 hours  clotrimazole Lozenge 1 Lozenge Oral five times a day  voriconazole 200 milliGRAM(s) Oral every 12 hours    2. VOD Prophylaxis: Actigall, Glutamine, Heparin (dosed at 100 units / kg / day)     3. GI Prophylaxis:    pantoprazole    Tablet 40 milliGRAM(s) Oral before breakfast    4. Mouth care - NS / NaHCO3 rinses, Mycelex, Biotene; Skin care     5. GVHD prophylaxis   mycophenolate mofetil 1000 milliGRAM(s) Oral <User Schedule>  tacrolimus 1.5 milliGRAM(s) Oral two times a day  CTX days + 3, + 4     6. Transfuse & replete electrolytes prn   magnesium oxide 400 milliGRAM(s) Oral three times a day with meals  Magnesium Sulfate 2G IV once  Transfuse 1U PRBCs    7. IV hydration, daily weights, strict I&O, prn diuresis     8. PO intake as tolerated, nutrition follow up as needed, MVI, folic acid     9. Antiemetics, anti-diarrhea medications:   metoclopramide Injectable 10 milliGRAM(s) IV Push every 6 hours PRN  ondansetron Injectable 8 milliGRAM(s) IV Push every 8 hours PRN    10. OOB as tolerated, physical therapy consult if needed     11. Monitor coags / fibrinogen 2x week, vitamin K as needed     12. Monitor closely for clinical changes, monitor for fevers     13. Emotional support provided, plan of care discussed and questions addressed     14. Patient education done regarding chemotherapy prep, plan of care, restrictions and discharge planning     15. Continue regular social work input      I have written the above note for Dr. Quiñones who performed service with me in the room.   Ashley George, ANP-BC ([phone])    I have seen and examined patient with NP, I agree with above note as scribed.

## 2019-10-31 NOTE — CHART NOTE - NSCHARTNOTEFT_GEN_A_CORE
Nutrition Follow Up Note  Patient seen for: Nutrition follow up and calorie count results. 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    Source: pt    Diet : Regular with glutasolve 1 packet per day    Patient reports: no N+V, pt endorses loose stool but only 1 episode per day, pt also reports ongoing taste alterations and feeling as though foods do not have a flavor.      PO intake : Pt with ongoing decreased appetite in setting of taste changes and fatigue of institutional foods, pt stated he has been taking mostly foods that his girlfriend has been bringing in such as chicken noodle soup. Yesterday pt had a drinkable yogurt, 2 milkshakes and a small amount of smoothie all day. The day prior pt had only 2 orange juices and a large soup. Pt S/P 2 day calorie count 10/29-10/30 Day 1: 580 Kcal, 18g protein, Day 2: 1280 Kcal, 52g protein, 2 day average 930 Kcal, 35g protein to meet 38% low end calorie and 30% low end protein needs. Pt feels as though he will eat much better at home as this was his pattern after his initial hospitalizations, pt agreed to take a drinkable yogurt smoothie this morning as well as oatmeal which he plans to enhance with butter and syrup. RD stressed importance of adequate intake following stem cell transplant with overall goal for weight maintenance, discussed ordering nutrient dense snacks with meals to consume in between to mimic smaller, more frequent meals.     Estimated needs based on admission weight 81.4 Kg    8264-0099 Kcal (30-35 Kcal/Kg)  114-130g protein (1.4-1.6g protein/Kg)      Weight: 179.6 lbs (10/3), 173.9 lbs (10/22), 171.7 lbs (10/30), weight slightly decreased from admission.     Pertinent Medications: MEDICATIONS  (STANDING):  acetaminophen   Tablet .. 650 milliGRAM(s) Oral every 6 hours  acetaminophen   Tablet .. 650 milliGRAM(s) Oral <User Schedule>  acyclovir   Oral Tab/Cap 400 milliGRAM(s) Oral every 8 hours  ALPRAZolam 1 milliGRAM(s) Oral at bedtime  Biotene Dry Mouth Oral Rinse 5 milliLiter(s) Swish and Spit five times a day  chlorhexidine 4% Liquid 1 Application(s) Topical <User Schedule>  clotrimazole Lozenge 1 Lozenge Oral five times a day  docusate sodium 100 milliGRAM(s) Oral three times a day  enalapril 2.5 milliGRAM(s) Oral daily  finasteride 5 milliGRAM(s) Oral daily  folic acid 1 milliGRAM(s) Oral daily  heparin  Infusion 339 Unit(s)/Hr (3.39 mL/Hr) IV Continuous <Continuous>  lidocaine/prilocaine Cream 1 Application(s) Topical daily  magnesium oxide 400 milliGRAM(s) Oral three times a day with meals  metoprolol succinate ER 12.5 milliGRAM(s) Oral two times a day  multivitamin 1 Tablet(s) Oral daily  mycophenolate mofetil 1000 milliGRAM(s) Oral <User Schedule>  pantoprazole    Tablet 40 milliGRAM(s) Oral before breakfast  senna 2 Tablet(s) Oral at bedtime  sertraline 50 milliGRAM(s) Oral at bedtime  sodium bicarbonate Mouth Rinse 10 milliLiter(s) Swish and Spit five times a day  sodium chloride 0.9%. 1000 milliLiter(s) (20 mL/Hr) IV Continuous <Continuous>  tacrolimus 1.5 milliGRAM(s) Oral two times a day  tamsulosin 0.4 milliGRAM(s) Oral at bedtime  ursodiol Capsule 300 milliGRAM(s) Oral two times a day with meals  voriconazole 200 milliGRAM(s) Oral every 12 hours    MEDICATIONS  (PRN):  acetaminophen   Tablet .. 650 milliGRAM(s) Oral every 6 hours PRN Temp greater or equal to 38C (100.4F), Mild Pain (1 - 3)  acetaminophen  IVPB .. 1000 milliGRAM(s) IV Intermittent once PRN Temp greater or equal to 38.5C (101.3F)  artificial tears (preservative free) Ophthalmic Solution 2 Drop(s) Both EYES two times a day PRN Dry Eyes  diphenhydrAMINE   Injectable 25 milliGRAM(s) IV Push every 4 hours PRN Allergy symptoms  metoclopramide Injectable 10 milliGRAM(s) IV Push every 6 hours PRN Nausea and/or Vomiting  ondansetron Injectable 8 milliGRAM(s) IV Push every 8 hours PRN Nausea and/or Vomiting  oxyCODONE    IR 5 milliGRAM(s) Oral every 6 hours PRN Moderate Pain (4 - 6)  sodium chloride 0.9% lock flush 10 milliLiter(s) IV Push every 1 hour PRN Pre/post blood products, medications, blood draw, and to maintain line patency    Pertinent Labs: 10-31 @ 06:59: Na 138, BUN 13, Cr 1.00, <H>, K+ 4.5, Phos 4.2, Mg 1.5<L>, Alk Phos 87, ALT/SGPT 7<L>, AST/SGOT 6<L>, HbA1c --    Finger Sticks: none      Skin per nursing documentation: intact  Edema: none    Estimated Needs:   [ x] no change since previous assessment  [ ] recalculated:     Previous Nutrition Diagnosis: Inadequate oral intake  Nutrition Diagnosis is: ongoing, addressed with milkshakes though shake it up program and foods from home    New Nutrition Diagnosis:  Related to:    As evidenced by:      Interventions:     Recommend  1) Continue diet as ordered, will continue to provide milkshakes as available  2) Continue to encourage po intake and obtain/honor food preferences as able   3) Calorie count results discussed with medical team   4) Review food safety diet guidelines as needed, pt declined questions at this time   Monitoring and Evaluation:     Continue to monitor Nutritional intake, Tolerance to diet prescription, weights, labs, skin integrity    RD remains available upon request and will follow up per protocol Nutrition Follow Up Note  Patient seen for: Nutrition follow up and calorie count results. 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    Source: pt    Diet : Regular with glutasolve 1 packet per day    Patient reports: no N+V, pt endorses loose stool but only 1 episode per day, pt also reports ongoing taste alterations and feeling as though foods do not have a flavor.      PO intake : Pt with ongoing decreased appetite in setting of taste changes and fatigue of institutional foods, pt stated he has been taking mostly foods that his girlfriend has been bringing in such as chicken noodle soup. Yesterday pt had a drinkable yogurt, 2 milkshakes and a small amount of smoothie all day. The day prior pt had only 2 orange juices and a large soup. Pt S/P 2 day calorie count 10/29-10/30 Day 1: 580 Kcal, 18g protein, Day 2: 1280 Kcal, 52g protein, 2 day average 930 Kcal, 35g protein to meet 38% low end calorie and 30% low end protein needs. Pt feels as though he will eat much better at home as this was his pattern after his initial hospitalizations, pt agreed to take a drinkable yogurt smoothie this morning as well as oatmeal which he plans to enhance with butter and syrup. RD stressed importance of adequate intake following stem cell transplant with overall goal for weight maintenance, discussed ordering nutrient dense snacks with meals to consume in between to mimic smaller, more frequent meals.     Estimated needs based on admission weight 81.4 Kg    6476-8016 Kcal (30-35 Kcal/Kg)  114-130g protein (1.4-1.6g protein/Kg)      Weight: 179.6 lbs (10/3), 173.9 lbs (10/22), 171.7 lbs (10/30), weight slightly decreased from admission.     Pertinent Medications: MEDICATIONS  (STANDING):  acetaminophen   Tablet .. 650 milliGRAM(s) Oral every 6 hours  acetaminophen   Tablet .. 650 milliGRAM(s) Oral <User Schedule>  acyclovir   Oral Tab/Cap 400 milliGRAM(s) Oral every 8 hours  ALPRAZolam 1 milliGRAM(s) Oral at bedtime  Biotene Dry Mouth Oral Rinse 5 milliLiter(s) Swish and Spit five times a day  chlorhexidine 4% Liquid 1 Application(s) Topical <User Schedule>  clotrimazole Lozenge 1 Lozenge Oral five times a day  docusate sodium 100 milliGRAM(s) Oral three times a day  enalapril 2.5 milliGRAM(s) Oral daily  finasteride 5 milliGRAM(s) Oral daily  folic acid 1 milliGRAM(s) Oral daily  heparin  Infusion 339 Unit(s)/Hr (3.39 mL/Hr) IV Continuous <Continuous>  lidocaine/prilocaine Cream 1 Application(s) Topical daily  magnesium oxide 400 milliGRAM(s) Oral three times a day with meals  metoprolol succinate ER 12.5 milliGRAM(s) Oral two times a day  multivitamin 1 Tablet(s) Oral daily  mycophenolate mofetil 1000 milliGRAM(s) Oral <User Schedule>  pantoprazole    Tablet 40 milliGRAM(s) Oral before breakfast  senna 2 Tablet(s) Oral at bedtime  sertraline 50 milliGRAM(s) Oral at bedtime  sodium bicarbonate Mouth Rinse 10 milliLiter(s) Swish and Spit five times a day  sodium chloride 0.9%. 1000 milliLiter(s) (20 mL/Hr) IV Continuous <Continuous>  tacrolimus 1.5 milliGRAM(s) Oral two times a day  tamsulosin 0.4 milliGRAM(s) Oral at bedtime  ursodiol Capsule 300 milliGRAM(s) Oral two times a day with meals  voriconazole 200 milliGRAM(s) Oral every 12 hours    MEDICATIONS  (PRN):  acetaminophen   Tablet .. 650 milliGRAM(s) Oral every 6 hours PRN Temp greater or equal to 38C (100.4F), Mild Pain (1 - 3)  acetaminophen  IVPB .. 1000 milliGRAM(s) IV Intermittent once PRN Temp greater or equal to 38.5C (101.3F)  artificial tears (preservative free) Ophthalmic Solution 2 Drop(s) Both EYES two times a day PRN Dry Eyes  diphenhydrAMINE   Injectable 25 milliGRAM(s) IV Push every 4 hours PRN Allergy symptoms  metoclopramide Injectable 10 milliGRAM(s) IV Push every 6 hours PRN Nausea and/or Vomiting  ondansetron Injectable 8 milliGRAM(s) IV Push every 8 hours PRN Nausea and/or Vomiting  oxyCODONE    IR 5 milliGRAM(s) Oral every 6 hours PRN Moderate Pain (4 - 6)  sodium chloride 0.9% lock flush 10 milliLiter(s) IV Push every 1 hour PRN Pre/post blood products, medications, blood draw, and to maintain line patency    Pertinent Labs: 10-31 @ 06:59: Na 138, BUN 13, Cr 1.00, <H>, K+ 4.5, Phos 4.2, Mg 1.5<L>, Alk Phos 87, ALT/SGPT 7<L>, AST/SGOT 6<L>, HbA1c --    Finger Sticks: none      Skin per nursing documentation: intact  Edema: none    Estimated Needs:   [ x] no change since previous assessment  [ ] recalculated:     Previous Nutrition Diagnosis: Inadequate oral intake  Nutrition Diagnosis is: ongoing, addressed with milkshakes though shake it up program and foods from home    New Nutrition Diagnosis:  Related to:    As evidenced by:      Interventions:     Recommend  1) Continue diet as ordered, will continue to provide milkshakes as available  2) Continue to encourage po intake and obtain/honor food preferences as able   3) Calorie count results discussed with medical team, plan to extend calorie count for one additional day, RN and pt informed, RD filled out intake for today thus far per pt recall.    4) Review food safety diet guidelines as needed, pt declined questions at this time   Monitoring and Evaluation:     Continue to monitor Nutritional intake, Tolerance to diet prescription, weights, labs, skin integrity    RD remains available upon request and will follow up per protocol

## 2019-10-31 NOTE — DISCHARGE NOTE PROVIDER - NSDCMRMEDTOKEN_GEN_ALL_CORE_FT
acyclovir 400 mg oral tablet: 1 tab(s) orally every 8 hours  atovaquone 750 mg/5 mL oral suspension: 5 milliliter(s) orally 2 times a day  enalapril 2.5 mg oral tablet: 1 tab(s) orally once a day  folic acid 1 mg oral tablet: 1 tab(s) orally once a day  magnesium oxide 400 mg (241.3 mg elemental magnesium) oral tablet: 1 tab(s) orally 3 times a day (with meals)  metoclopramide 10 mg oral tablet: 1 tab(s) orally 4 times a day (before meals and at bedtime), As Needed  Metoprolol Succinate ER 25 mg oral tablet, extended release: 0.5 tab(s) orally 2 times a day MDD:25mg  Multiple Vitamins oral tablet: 1 tab(s) orally once a day  mycophenolate mofetil 500 mg oral tablet: 2 tab(s) orally 3 times a day  ondansetron 8 mg oral tablet: 1 tab(s) orally 3 times a day, As Needed  pantoprazole 40 mg oral delayed release tablet: 1 tab(s) orally once a day (before a meal)  Proscar 5 mg oral tablet: 1 tab(s) orally once a day  tacrolimus 0.5 mg oral capsule: 2 cap(s) orally every 12 hours  tacrolimus 1 mg oral capsule: 2 cap(s) orally 2 times a day  tamsulosin 0.4 mg oral capsule: 1 cap(s) orally once a day  ursodiol 300 mg oral capsule: 1 cap(s) orally 2 times a day (with meals)  voriconazole 200 mg oral tablet: 1 tab(s) orally every 12 hours MDD:200mg  Xanax 0.5 mg oral tablet: orally once a day  Zoloft 50 mg oral tablet: 1 tab(s) orally once a day (at bedtime) acyclovir 400 mg oral tablet: 1 tab(s) orally every 8 hours  atovaquone 750 mg/5 mL oral suspension: 5 milliliter(s) orally 2 times a day  enalapril 2.5 mg oral tablet: 1 tab(s) orally once a day  folic acid 1 mg oral tablet: 1 tab(s) orally once a day  magnesium oxide 400 mg (241.3 mg elemental magnesium) oral tablet: 1 tab(s) orally 3 times a day (with meals)  metoclopramide 10 mg oral tablet: 1 tab(s) orally 4 times a day (before meals and at bedtime), As Needed  Metoprolol Succinate ER 25 mg oral tablet, extended release: 0.5 tab(s) orally 2 times a day MDD:25mg   Multiple Vitamins oral tablet: 1 tab(s) orally once a day  mycophenolate mofetil 500 mg oral tablet: 2 tab(s) orally 3 times a day  ondansetron 8 mg oral tablet: 1 tab(s) orally 3 times a day, As Needed  pantoprazole 40 mg oral delayed release tablet: 1 tab(s) orally once a day (before a meal)  tacrolimus 0.5 mg oral capsule: 2 cap(s) orally every 12 hours  tamsulosin 0.4 mg oral capsule: 1 cap(s) orally once a day  ursodiol 300 mg oral capsule: 1 cap(s) orally 2 times a day (with meals)  voriconazole 200 mg oral tablet: 1 tab(s) orally every 12 hours MDD:200mg acyclovir 400 mg oral tablet: 1 tab(s) orally every 8 hours  atovaquone 750 mg/5 mL oral suspension: 5 milliliter(s) orally 2 times a day  enalapril 2.5 mg oral tablet: 1 tab(s) orally once a day  finasteride 5 mg oral tablet: 1 tab(s) orally once a day  folic acid 1 mg oral tablet: 1 tab(s) orally once a day  magnesium oxide 400 mg (241.3 mg elemental magnesium) oral tablet: 1 tab(s) orally 3 times a day (with meals)  metoclopramide 10 mg oral tablet: 1 tab(s) orally 4 times a day (before meals and at bedtime), As Needed  metoprolol succinate 25 mg oral tablet, extended release: 12.5 milligram(s) orally once a day  Multiple Vitamins oral tablet: 1 tab(s) orally once a day  mycophenolate mofetil 500 mg oral tablet: 2 tab(s) orally 3 times a day  ondansetron 8 mg oral tablet: 1 tab(s) orally 3 times a day, As Needed  pantoprazole 40 mg oral delayed release tablet: 1 tab(s) orally once a day (before a meal)  tacrolimus 0.5 mg oral capsule: 2 cap(s) orally every 12 hours  tamsulosin 0.4 mg oral capsule: 1 cap(s) orally once a day  ursodiol 300 mg oral capsule: 1 cap(s) orally 2 times a day (with meals)  voriconazole 200 mg oral tablet: 1 tab(s) orally every 12 hours MDD:200mg  Zoloft 50 mg oral tablet: 1 tab(s) orally once a day

## 2019-10-31 NOTE — PROGRESS NOTE ADULT - SUBJECTIVE AND OBJECTIVE BOX
[facility] - Ede Zamudio, Riki, Edgar, Gem, Tabitha Garcia  Office Number:  [phone]    Patient resting comfortably in bed in NAD.  Laying flat with no respiratory distress.  No complaints of chest pain, dyspnea, palpitations, PND, or orthopnea.    ROS: negative unless otherwise mentioned.    Telemetry:  off    MEDICATIONS  (STANDING):  acetaminophen   Tablet .. 650 milliGRAM(s) Oral every 6 hours  acetaminophen   Tablet .. 650 milliGRAM(s) Oral <User Schedule>  acyclovir   Oral Tab/Cap 400 milliGRAM(s) Oral every 8 hours  ALPRAZolam 1 milliGRAM(s) Oral at bedtime  Biotene Dry Mouth Oral Rinse 5 milliLiter(s) Swish and Spit five times a day  chlorhexidine 4% Liquid 1 Application(s) Topical <User Schedule>  clotrimazole Lozenge 1 Lozenge Oral five times a day  docusate sodium 100 milliGRAM(s) Oral three times a day  enalapril 2.5 milliGRAM(s) Oral daily  finasteride 5 milliGRAM(s) Oral daily  folic acid 1 milliGRAM(s) Oral daily  heparin  Infusion 339 Unit(s)/Hr (3.39 mL/Hr) IV Continuous <Continuous>  lidocaine/prilocaine Cream 1 Application(s) Topical daily  magnesium oxide 400 milliGRAM(s) Oral three times a day with meals  metoprolol succinate ER 12.5 milliGRAM(s) Oral two times a day  multivitamin 1 Tablet(s) Oral daily  mycophenolate mofetil 1000 milliGRAM(s) Oral <User Schedule>  pantoprazole    Tablet 40 milliGRAM(s) Oral before breakfast  senna 2 Tablet(s) Oral at bedtime  sertraline 50 milliGRAM(s) Oral at bedtime  sodium bicarbonate Mouth Rinse 10 milliLiter(s) Swish and Spit five times a day  sodium chloride 0.9%. 1000 milliLiter(s) (20 mL/Hr) IV Continuous <Continuous>  tacrolimus 1.5 milliGRAM(s) Oral two times a day  tamsulosin 0.4 milliGRAM(s) Oral at bedtime  ursodiol Capsule 300 milliGRAM(s) Oral two times a day with meals  voriconazole 200 milliGRAM(s) Oral every 12 hours    MEDICATIONS  (PRN):  acetaminophen   Tablet .. 650 milliGRAM(s) Oral every 6 hours PRN Temp greater or equal to 38C (100.4F), Mild Pain (1 - 3)  acetaminophen  IVPB .. 1000 milliGRAM(s) IV Intermittent once PRN Temp greater or equal to 38.5C (101.3F)  artificial tears (preservative free) Ophthalmic Solution 2 Drop(s) Both EYES two times a day PRN Dry Eyes  diphenhydrAMINE   Injectable 25 milliGRAM(s) IV Push every 4 hours PRN Allergy symptoms  metoclopramide Injectable 10 milliGRAM(s) IV Push every 6 hours PRN Nausea and/or Vomiting  ondansetron Injectable 8 milliGRAM(s) IV Push every 8 hours PRN Nausea and/or Vomiting  oxyCODONE    IR 5 milliGRAM(s) Oral every 6 hours PRN Moderate Pain (4 - 6)  sodium chloride 0.9% lock flush 10 milliLiter(s) IV Push every 1 hour PRN Pre/post blood products, medications, blood draw, and to maintain line patency      Allergies    No Known Allergies    Intolerances        Vital Signs Last 24 Hrs  T(C): 36.6 (31 Oct 2019 06:30), Max: 37.2 (30 Oct 2019 21:07)  T(F): 97.9 (31 Oct 2019 06:30), Max: 99 (30 Oct 2019 21:07)  HR: 84 (31 Oct 2019 06:30) (83 - 96)  BP: 104/64 (31 Oct 2019 06:30) (102/60 - 120/72)  BP(mean): --  RR: 18 (31 Oct 2019 06:30) (18 - 18)  SpO2: 95% (31 Oct 2019 06:30) (95% - 99%)    I&O's Summary    30 Oct 2019 07:01  -  31 Oct 2019 07:00  --------------------------------------------------------  IN: 1365 mL / OUT: 1000 mL / NET: 365 mL        ON EXAM:    Constitutional: NAD, awake    HEENT: Moist Mucous Membranes, Anicteric  Pulmonary: Non-labored, coarse bs at left base, No wheezing, rales or rhonchi  Cardiovascular: Regular, S1 and S2, No murmurs, rubs, gallops or clicks  Gastrointestinal: Bowel Sounds present, soft, nontender.   Lymph: No peripheral edema. No lymphadenopathy.  Skin: No visible rashes or ulcers.  Psych:  Mood & affect appropriate for situation    LABS: All Labs Reviewed:                        7.3    5.92  )-----------( 10       ( 31 Oct 2019 06:59 )             21.6                         7.3    7.72  )-----------( 11       ( 30 Oct 2019 07:00 )             21.7                         7.4    5.08  )-----------( 13       ( 29 Oct 2019 07:01 )             22.6     31 Oct 2019 06:59    138    |  98     |  13     ----------------------------<  100    4.5     |  26     |  1.00   30 Oct 2019 07:00    140    |  105    |  14     ----------------------------<  107    4.7     |  23     |  0.97   29 Oct 2019 07:01    136    |  101    |  14     ----------------------------<  99     4.5     |  23     |  0.97     Ca    9.2        31 Oct 2019 06:59  Ca    9.2        30 Oct 2019 07:00  Ca    9.3        29 Oct 2019 07:01  Phos  4.2       31 Oct 2019 06:59  Phos  3.9       30 Oct 2019 07:00  Phos  4.1       29 Oct 2019 07:01  Mg     1.5       31 Oct 2019 06:59  Mg     1.7       30 Oct 2019 07:00  Mg     1.5       29 Oct 2019 07:01    TPro  5.6    /  Alb  3.3    /  TBili  0.2    /  DBili  x      /  AST  6      /  ALT  7      /  AlkPhos  87     31 Oct 2019 06:59  TPro  5.6    /  Alb  3.4    /  TBili  0.2    /  DBili  <0.1   /  AST  8      /  ALT  7      /  AlkPhos  88     30 Oct 2019 07:00  TPro  5.6    /  Alb  3.4    /  TBili  0.2    /  DBili  x      /  AST  7      /  ALT  7      /  AlkPhos  83     29 Oct 2019 07:01    PT/INR - ( 31 Oct 2019 06:59 )   PT: 12.8 sec;   INR: 1.11 ratio         PTT - ( 31 Oct 2019 06:59 )  PTT:29.2 sec      Blood Culture:

## 2019-10-31 NOTE — DISCHARGE NOTE PROVIDER - NSDCCPCAREPLAN_GEN_ALL_CORE_FT
PRINCIPAL DISCHARGE DIAGNOSIS  Diagnosis: Stem cells transplant status  Assessment and Plan of Treatment: 1. Diet and activities as per [facility] discharge guidelines and safe food handling guidelines. NO RESTAURANT OR TAKE OUT FOOD AT THIS TIME, ONLY HOME COOKED PREPARED/FROZEN FOODS. You are allowed to have fresh baked pizza right out of the oven. This is the ONLY takeout food at this time.  2. Notify your physician if bleeding; swelling; persistent nausea and vomiting; unable to urinate; pain not relieved by medications; fever; numbness, tingling; excessive diarrhea, inability to tolerate liquids or foods; increased irritability or sluggishness, rash  3. On the day you have an appointment at the [facility], do NOT take your Tacrolimus morning dose. Instead, bring it with you to the [facility]. After you have your bloods drawn you may take your morning dose of Tacrolimus.        SECONDARY DISCHARGE DIAGNOSES  Diagnosis: Need for prophylactic measure  Assessment and Plan of Treatment: 1. Continue your prophylactic medications as directed by Dr. Gaytan   Acyclovir - antivital prophylaxis    Fluconazole - antifungal prophylaxis   Mepron - PCP prophylaxis   Protonix - GI prophylaxis   Ursodiol - VOD prophylaxis PRINCIPAL DISCHARGE DIAGNOSIS  Diagnosis: Stem cells transplant status  Assessment and Plan of Treatment: 1. Diet and activities as per [facility] discharge guidelines and safe food handling guidelines. NO RESTAURANT OR TAKE OUT FOOD AT THIS TIME, ONLY HOME COOKED PREPARED/FROZEN FOODS. You are allowed to have fresh baked pizza right out of the oven. This is the ONLY takeout food at this time.  2. Notify your physician if bleeding; swelling; persistent nausea and vomiting; unable to urinate; pain not relieved by medications; fever; numbness, tingling; excessive diarrhea, inability to tolerate liquids or foods; increased irritability or sluggishness, rash  3. On the day you have an appointment at the [facility], do NOT take your Tacrolimus morning dose. Instead, bring it with you to the [facility]. After you have your bloods drawn you may take your morning dose of Tacrolimus.        SECONDARY DISCHARGE DIAGNOSES  Diagnosis: Need for prophylactic measure  Assessment and Plan of Treatment: 1. Continue your prophylactic medications as directed by Dr. Gaytan   Acyclovir - antivital prophylaxis    Voriconazole - antifungal prophylaxis   Mepron - PCP prophylaxis   Protonix - GI prophylaxis   Ursodiol - VOD prophylaxis

## 2019-10-31 NOTE — PROGRESS NOTE ADULT - ATTENDING COMMENTS
59 year old male, with previously diagnosed acute myeloid leukemia. s/p induction chemotherapy with Daunorubicin/Cytarabine and HIDAC consolidation chemotherapy, now admitted for Haplo-identical stem cell transplant from son. Conditioning regimen of Fludarabine, Cytoxan, and TBI. Other history includes non-ischemic cardiomyopathy (recent EF 25% 9/19/19), COLLEEN, DVT/PE, HTN, Factor V Leiden and amputation of right toe due to osteomyelitis.  Day + 21, s/p haploPBSCT, with neutrophil engraftment  Started  Zarxio on day +5,  MMF and Tacrolimus. Check Tacrolimus levels on Monday and Thursday. D/C Zarxio   Prior fevers likely due to haplostorm,  s/p CTX 2/2.  Follow temps if persist can dose 1 mg/kg of solumedrol.  Blood/urine cultures from 10/10 and 10/12 negative.  Continue voriconazole, acyclovir prophylaxis. Cefepime discontinued with neutrophil recovery; afebrile, cultures negative.  Strict I&O, daily weights, prn diuresis   Renal insufficiency- monitor daily creatinine, normal today.   Anxiolytics, antinausea, antidiarrhea medications as needed.  Nutritional support.  Hx non-ischemic CM, followed by Dr. Luke Lyn, last Echo 9/19 (EF 25%).  Monitor for fluid overload.  VOD prophylaxis - low dose heparin gtt (dosed at 100 units / kg / day), glutamine supplementation, Actigall BID   GI prophylaxis - Protonix po QD.  Electrolyte support  Aggressive mouth care and skin care as per protocol.  OOB/ambulate  Discharge planning 59 year old male, with previously diagnosed acute myeloid leukemia. s/p induction chemotherapy with Daunorubicin/Cytarabine and HIDAC consolidation chemotherapy, now admitted for Haplo-identical stem cell transplant from son. Conditioning regimen of Fludarabine, Cytoxan, and TBI. Other history includes non-ischemic cardiomyopathy (recent EF 25% 9/19/19), COLLEEN, DVT/PE, HTN, Factor V Leiden and amputation of right toe due to osteomyelitis.  Day + 22, s/p haploPBSCT, with neutrophil engraftment  Started  Zarxio on day +5,  MMF and Tacrolimus. Check Tacrolimus levels on Monday and Thursday. D/C'd Zarxio with WBC recovery  Prior fevers likely due to haplostorm,  s/p CTX 2/2.  Follow temps if persist can dose 1 mg/kg of solumedrol.  Blood/urine cultures from 10/10 and 10/12 negative.  Continue voriconazole, acyclovir prophylaxis. Cefepime discontinued with neutrophil recovery; afebrile, cultures negative.  Strict I&O, daily weights, prn diuresis   H/O renal insufficiency- now resolved  Anxiolytics, antinausea, antidiarrhea medications as needed.  Nutritional support.  Hx non-ischemic CM, followed by Dr. Luke Lyn, last Echo 9/19 (EF 25%).  Monitor for fluid overload.  VOD prophylaxis - low dose heparin gtt (dosed at 100 units / kg / day), glutamine supplementation, Actigall BID   GI prophylaxis - Protonix po QD.  Electrolyte support  Aggressive mouth care and skin care as per protocol.  OOB/ambulate  Discharge planning

## 2019-10-31 NOTE — DISCHARGE NOTE PROVIDER - INSTRUCTIONS
Diet and activities as per [facility] discharge guidelines and safe food handling guidelines. NO RESTAURANT OR TAKE OUT FOOD AT THIS TIME, ONLY HOME COOKED PREPARED/FROZEN FOODS. You are allowed to have fresh baked pizza right out of the oven. This is the ONLY takeout food at this time.

## 2019-10-31 NOTE — PHARMACOTHERAPY INTERVENTION NOTE - COMMENTS
Allergies    No Known Allergies    Intolerances        MEDICATIONS  (STANDING):  acetaminophen   Tablet .. 650 milliGRAM(s) Oral every 6 hours  acetaminophen   Tablet .. 650 milliGRAM(s) Oral <User Schedule>  acyclovir   Oral Tab/Cap 400 milliGRAM(s) Oral every 8 hours  ALPRAZolam 1 milliGRAM(s) Oral at bedtime  Biotene Dry Mouth Oral Rinse 5 milliLiter(s) Swish and Spit five times a day  chlorhexidine 4% Liquid 1 Application(s) Topical <User Schedule>  clotrimazole Lozenge 1 Lozenge Oral five times a day  docusate sodium 100 milliGRAM(s) Oral three times a day  enalapril 2.5 milliGRAM(s) Oral daily  finasteride 5 milliGRAM(s) Oral daily  folic acid 1 milliGRAM(s) Oral daily  heparin  Infusion 339 Unit(s)/Hr (3.39 mL/Hr) IV Continuous <Continuous>  lidocaine/prilocaine Cream 1 Application(s) Topical daily  magnesium oxide 400 milliGRAM(s) Oral three times a day with meals  metoprolol succinate ER 12.5 milliGRAM(s) Oral two times a day  multivitamin 1 Tablet(s) Oral daily  mycophenolate mofetil 1000 milliGRAM(s) Oral <User Schedule>  pantoprazole    Tablet 40 milliGRAM(s) Oral before breakfast  senna 2 Tablet(s) Oral at bedtime  sertraline 50 milliGRAM(s) Oral at bedtime  sodium bicarbonate Mouth Rinse 10 milliLiter(s) Swish and Spit five times a day  sodium chloride 0.9%. 1000 milliLiter(s) (20 mL/Hr) IV Continuous <Continuous>  tacrolimus 1.5 milliGRAM(s) Oral two times a day  tamsulosin 0.4 milliGRAM(s) Oral at bedtime  ursodiol Capsule 300 milliGRAM(s) Oral two times a day with meals  voriconazole 200 milliGRAM(s) Oral every 12 hours    MEDICATIONS  (PRN):  acetaminophen   Tablet .. 650 milliGRAM(s) Oral every 6 hours PRN Temp greater or equal to 38C (100.4F), Mild Pain (1 - 3)  acetaminophen  IVPB .. 1000 milliGRAM(s) IV Intermittent once PRN Temp greater or equal to 38.5C (101.3F)  artificial tears (preservative free) Ophthalmic Solution 2 Drop(s) Both EYES two times a day PRN Dry Eyes  diphenhydrAMINE   Injectable 25 milliGRAM(s) IV Push every 4 hours PRN Allergy symptoms  metoclopramide Injectable 10 milliGRAM(s) IV Push every 6 hours PRN Nausea and/or Vomiting  ondansetron Injectable 8 milliGRAM(s) IV Push every 8 hours PRN Nausea and/or Vomiting  oxyCODONE    IR 5 milliGRAM(s) Oral every 6 hours PRN Moderate Pain (4 - 6)  sodium chloride 0.9% lock flush 10 milliLiter(s) IV Push every 1 hour PRN Pre/post blood products, medications, blood draw, and to maintain line patency    Recipient of Education:  [x  ]Patient  [ x ]Family, please specify __significant other_________  [  ]Other, please specify ____________    Readiness to Learn:  [x  ]Ready  [  ]Not ready: cognitive  [  ]Not ready: physical  [  ]Not ready: emotional  Comment(s):    Barriers to Information Exhange:  [ x ]None identified  [  ]Vision  [  ]Hearing  [  ]Language  [  ]Literacy  [  ]Memory and Learning  [  ]Culture/Restorationism  [  ]Other, please specify ____________  Comment(s):    Patient Education Topics:  [  ]Anticoagulation  [  ]Heart Failure (HF)  [  ]Chronic Obstructive Pulmonary Disease (COPD)  [  ]Diabetes Mellitus (DM)  [  ]Stroke  [ x ]Other, please specify __Allogeneic stem cell transplant discharge _______  Comment(s):    Medication Counseling Points:  [ x ]Medications Reviewed  [ x ]Medication Schedule  [ x ]Precautions  [ x ]Side Effects  [ x ]Indication for Use  [ x ]Drug/Drug Interactions  [ x ]Drug/Food Interactions  [  ]Other, please specify ____________  Comment(s):    Teaching Method:  [ x ]Verbal Instruction  [ x ]Written Material, please specify ___Allogeneic stem cell transplant education booklet_________  [  ]Skill Demonstration  [  ]Teach Back (Patient repeats in own words)  [  ]Ask Me 3  [  ]Computer/Internet  [  ]Other, please specify ____________  Comment(s):    Educational Evaluation:  [x  ]Meets goals/outcomes  [  ]Partially meets - needs review/practice  [  ]Unable to meet - needs instruction  [  ]Reinforced previously met goal  [  ]Patient declines instruction  [  ]Not applicable  Comment(s):    Time spent: _20____minutes

## 2019-10-31 NOTE — DISCHARGE NOTE PROVIDER - NSDCFUADDAPPT_GEN_ALL_CORE_FT
You have appointments at [facility] as follows:  11/4: Possible platelets 12 PM  11/7: Follow-up with YADIRA Lambert at 12 PM, possible platelets 1:30 PM  11/11: Possible platelets 1:30 PM  11/14: Follow-up with YADIRA Sanchez at 12 PM, possible platelets 1:30 PM  11/18: Possible platelets 1:30 PM  11/21: Possible platelets 1:30 PM, follow-up with Dr. Gaytan at 3 PM.

## 2019-10-31 NOTE — DISCHARGE NOTE PROVIDER - HOSPITAL COURSE
Mr. Delong is a 59 year old male with pmHx of non-ischemic cardiomyopathy (recent EF 25% on 9/19/19), COLLEEN, DVT/PE, HTN, aspergillosis, Factor V Leiden and amputation of the right toe due to osteomyelitis.  He was initially followed by [facility] for thew Factor V Leiden, placed on Zarelto and subsequently was sent to he ED for SOB where he was admitted and diagnosed with AML.  He is s/p induction chemotherapy with Daunorubicin/Cytarabine and HIDAC consolidation and is now admitted to [facility] for Haplo-identical stem cell transplant from his son.          Upon admission, a TLC was placed in IR. Mr. Delong received IV fluid hydration, pain management, nutritional support, anxiolysis, antiemetics, and antiviral, antifungal, antibacterial, GI, PCP and VOD prophylaxis. When his ANC dropped below 1000, he was started on prophylactic Cefepime. Labs were monitored on a daily basis and he received transfusional support and electrolyte repletion as needed.          While in patient, Mr. Delong was continued on his Voriconazole for history of aspergillosis.         During admission, Mr. Delong had pancytopenia related to the high dose chemotherapy prep regimen. He also experienced neutropenic fevers. When he became febrile, blood and urine cultures were sent and a CXR was completed which were unremarkable.        On 10/9/2019, following premedications, pt received 250 ml of allogeneic, related, haplo, fresh, mobilized HPC apheresis over 30-60 minutes.     Cell counts as follows:         Total MNCs (x10^8/kg) = 5.17    CD 34 cells (x10^6/kg) = 7.34    CD 3 Cells (x 10^7/kg) = 19.81    Cell viability  (%) = 100         Pt tolerated infusion without any adverse reaction or complications. Pt denies SOB, Chest pain, dizziness and headache.        Engraftment was noted on 10/28/2019. Post engraftment, a VNTR was sent to determine chimerism. Results are pending. CMV monitoring was also started. Daily Zarxio was discontinued, as was Cefepime given negative cultures.         Currently, Mr. Delong is stable for discharge home with outpatient follow up at the [facility].

## 2019-11-01 ENCOUNTER — OUTPATIENT (OUTPATIENT)
Dept: OUTPATIENT SERVICES | Facility: HOSPITAL | Age: 59
LOS: 1 days | Discharge: HOME | End: 2019-11-01

## 2019-11-01 ENCOUNTER — TRANSCRIPTION ENCOUNTER (OUTPATIENT)
Age: 59
End: 2019-11-01

## 2019-11-01 VITALS
RESPIRATION RATE: 20 BRPM | SYSTOLIC BLOOD PRESSURE: 133 MMHG | OXYGEN SATURATION: 96 % | DIASTOLIC BLOOD PRESSURE: 78 MMHG | TEMPERATURE: 99 F | HEART RATE: 94 BPM

## 2019-11-01 DIAGNOSIS — C92.00 ACUTE MYELOBLASTIC LEUKEMIA, NOT HAVING ACHIEVED REMISSION: ICD-10-CM

## 2019-11-01 LAB
ALBUMIN SERPL ELPH-MCNC: 3.3 G/DL — SIGNIFICANT CHANGE UP (ref 3.3–5)
ALP SERPL-CCNC: 88 U/L — SIGNIFICANT CHANGE UP (ref 40–120)
ALT FLD-CCNC: 6 U/L — LOW (ref 10–45)
ANION GAP SERPL CALC-SCNC: 7 MMOL/L — SIGNIFICANT CHANGE UP (ref 5–17)
AST SERPL-CCNC: 8 U/L — LOW (ref 10–40)
BASOPHILS # BLD AUTO: 0.01 K/UL — SIGNIFICANT CHANGE UP (ref 0–0.2)
BASOPHILS NFR BLD AUTO: 0.2 % — SIGNIFICANT CHANGE UP (ref 0–2)
BILIRUB SERPL-MCNC: 0.2 MG/DL — SIGNIFICANT CHANGE UP (ref 0.2–1.2)
BLD GP AB SCN SERPL QL: NEGATIVE — SIGNIFICANT CHANGE UP
BUN SERPL-MCNC: 14 MG/DL — SIGNIFICANT CHANGE UP (ref 7–23)
CALCIUM SERPL-MCNC: 9.1 MG/DL — SIGNIFICANT CHANGE UP (ref 8.4–10.5)
CHLORIDE SERPL-SCNC: 105 MMOL/L — SIGNIFICANT CHANGE UP (ref 96–108)
CO2 SERPL-SCNC: 23 MMOL/L — SIGNIFICANT CHANGE UP (ref 22–31)
CREAT SERPL-MCNC: 0.98 MG/DL — SIGNIFICANT CHANGE UP (ref 0.5–1.3)
EOSINOPHIL # BLD AUTO: 0 K/UL — SIGNIFICANT CHANGE UP (ref 0–0.5)
EOSINOPHIL NFR BLD AUTO: 0 % — SIGNIFICANT CHANGE UP (ref 0–6)
GLUCOSE SERPL-MCNC: 94 MG/DL — SIGNIFICANT CHANGE UP (ref 70–99)
HCT VFR BLD CALC: 23.1 % — LOW (ref 39–50)
HGB BLD-MCNC: 7.8 G/DL — LOW (ref 13–17)
IMM GRANULOCYTES NFR BLD AUTO: 4.6 % — HIGH (ref 0–1.5)
LDH SERPL L TO P-CCNC: 138 U/L — SIGNIFICANT CHANGE UP (ref 50–242)
LYMPHOCYTES # BLD AUTO: 0.01 K/UL — LOW (ref 1–3.3)
LYMPHOCYTES # BLD AUTO: 0.2 % — LOW (ref 13–44)
MAGNESIUM SERPL-MCNC: 1.7 MG/DL — SIGNIFICANT CHANGE UP (ref 1.6–2.6)
MANUAL SMEAR VERIFICATION: SIGNIFICANT CHANGE UP
MCHC RBC-ENTMCNC: 29.1 PG — SIGNIFICANT CHANGE UP (ref 27–34)
MCHC RBC-ENTMCNC: 33.8 GM/DL — SIGNIFICANT CHANGE UP (ref 32–36)
MCV RBC AUTO: 86.2 FL — SIGNIFICANT CHANGE UP (ref 80–100)
MONOCYTES # BLD AUTO: 2.26 K/UL — HIGH (ref 0–0.9)
MONOCYTES NFR BLD AUTO: 35.9 % — HIGH (ref 2–14)
NEUTROPHILS # BLD AUTO: 3.73 K/UL — SIGNIFICANT CHANGE UP (ref 1.8–7.4)
NEUTROPHILS NFR BLD AUTO: 59.1 % — SIGNIFICANT CHANGE UP (ref 43–77)
NRBC # BLD: 0 /100 WBCS — SIGNIFICANT CHANGE UP (ref 0–0)
PHOSPHATE SERPL-MCNC: 3.9 MG/DL — SIGNIFICANT CHANGE UP (ref 2.5–4.5)
PLAT MORPH BLD: NORMAL — SIGNIFICANT CHANGE UP
PLATELET # BLD AUTO: 12 K/UL — CRITICAL LOW (ref 150–400)
PLATELET # BLD AUTO: 21 K/UL — LOW (ref 150–400)
POTASSIUM SERPL-MCNC: 4.5 MMOL/L — SIGNIFICANT CHANGE UP (ref 3.5–5.3)
POTASSIUM SERPL-SCNC: 4.5 MMOL/L — SIGNIFICANT CHANGE UP (ref 3.5–5.3)
PROT SERPL-MCNC: 5.7 G/DL — LOW (ref 6–8.3)
RBC # BLD: 2.68 M/UL — LOW (ref 4.2–5.8)
RBC # FLD: 17.4 % — HIGH (ref 10.3–14.5)
RBC BLD AUTO: SIGNIFICANT CHANGE UP
RH IG SCN BLD-IMP: POSITIVE — SIGNIFICANT CHANGE UP
SODIUM SERPL-SCNC: 135 MMOL/L — SIGNIFICANT CHANGE UP (ref 135–145)
TACROLIMUS SERPL-MCNC: 10.2 NG/ML — SIGNIFICANT CHANGE UP
WBC # BLD: 6.3 K/UL — SIGNIFICANT CHANGE UP (ref 3.8–10.5)
WBC # FLD AUTO: 6.3 K/UL — SIGNIFICANT CHANGE UP (ref 3.8–10.5)

## 2019-11-01 PROCEDURE — P9037: CPT

## 2019-11-01 PROCEDURE — P9047: CPT

## 2019-11-01 PROCEDURE — 82955 ASSAY OF G6PD ENZYME: CPT

## 2019-11-01 PROCEDURE — P9011: CPT

## 2019-11-01 PROCEDURE — 85384 FIBRINOGEN ACTIVITY: CPT

## 2019-11-01 PROCEDURE — C1769: CPT

## 2019-11-01 PROCEDURE — 83615 LACTATE (LD) (LDH) ENZYME: CPT

## 2019-11-01 PROCEDURE — 82785 ASSAY OF IGE: CPT

## 2019-11-01 PROCEDURE — C1894: CPT

## 2019-11-01 PROCEDURE — 81265 STR MARKERS SPECIMEN ANAL: CPT

## 2019-11-01 PROCEDURE — 36556 INSERT NON-TUNNEL CV CATH: CPT

## 2019-11-01 PROCEDURE — 71045 X-RAY EXAM CHEST 1 VIEW: CPT

## 2019-11-01 PROCEDURE — 86923 COMPATIBILITY TEST ELECTRIC: CPT

## 2019-11-01 PROCEDURE — 84132 ASSAY OF SERUM POTASSIUM: CPT

## 2019-11-01 PROCEDURE — 86901 BLOOD TYPING SEROLOGIC RH(D): CPT

## 2019-11-01 PROCEDURE — 87040 BLOOD CULTURE FOR BACTERIA: CPT

## 2019-11-01 PROCEDURE — 86900 BLOOD TYPING SEROLOGIC ABO: CPT

## 2019-11-01 PROCEDURE — 80197 ASSAY OF TACROLIMUS: CPT

## 2019-11-01 PROCEDURE — 81267 CHIMERISM ANAL NO CELL SELEC: CPT

## 2019-11-01 PROCEDURE — 85610 PROTHROMBIN TIME: CPT

## 2019-11-01 PROCEDURE — 99238 HOSP IP/OBS DSCHRG MGMT 30/<: CPT | Mod: GC

## 2019-11-01 PROCEDURE — 77001 FLUOROGUIDE FOR VEIN DEVICE: CPT

## 2019-11-01 PROCEDURE — C1751: CPT

## 2019-11-01 PROCEDURE — 87086 URINE CULTURE/COLONY COUNT: CPT

## 2019-11-01 PROCEDURE — 93005 ELECTROCARDIOGRAM TRACING: CPT

## 2019-11-01 PROCEDURE — 84100 ASSAY OF PHOSPHORUS: CPT

## 2019-11-01 PROCEDURE — 85049 AUTOMATED PLATELET COUNT: CPT

## 2019-11-01 PROCEDURE — 83735 ASSAY OF MAGNESIUM: CPT

## 2019-11-01 PROCEDURE — 82248 BILIRUBIN DIRECT: CPT

## 2019-11-01 PROCEDURE — 81003 URINALYSIS AUTO W/O SCOPE: CPT

## 2019-11-01 PROCEDURE — 85027 COMPLETE CBC AUTOMATED: CPT

## 2019-11-01 PROCEDURE — 36430 TRANSFUSION BLD/BLD COMPNT: CPT

## 2019-11-01 PROCEDURE — 99232 SBSQ HOSP IP/OBS MODERATE 35: CPT

## 2019-11-01 PROCEDURE — 80053 COMPREHEN METABOLIC PANEL: CPT

## 2019-11-01 PROCEDURE — P9040: CPT

## 2019-11-01 PROCEDURE — 82784 ASSAY IGA/IGD/IGG/IGM EACH: CPT

## 2019-11-01 PROCEDURE — 76937 US GUIDE VASCULAR ACCESS: CPT

## 2019-11-01 PROCEDURE — 86850 RBC ANTIBODY SCREEN: CPT

## 2019-11-01 PROCEDURE — 38207 CRYOPRESERVE STEM CELLS: CPT

## 2019-11-01 PROCEDURE — 85730 THROMBOPLASTIN TIME PARTIAL: CPT

## 2019-11-01 PROCEDURE — 85045 AUTOMATED RETICULOCYTE COUNT: CPT

## 2019-11-01 PROCEDURE — 81005 URINALYSIS: CPT

## 2019-11-01 PROCEDURE — 81268 CHIMERISM ANAL W/CELL SELECT: CPT

## 2019-11-01 PROCEDURE — 81001 URINALYSIS AUTO W/SCOPE: CPT

## 2019-11-01 RX ORDER — ALPRAZOLAM 0.25 MG
1 TABLET ORAL AT BEDTIME
Refills: 0 | Status: DISCONTINUED | OUTPATIENT
Start: 2019-11-01 | End: 2019-11-01

## 2019-11-01 RX ORDER — HYDROCORTISONE 20 MG
50 TABLET ORAL EVERY 12 HOURS
Refills: 0 | Status: DISCONTINUED | OUTPATIENT
Start: 2019-11-01 | End: 2019-11-01

## 2019-11-01 RX ORDER — TACROLIMUS 5 MG/1
1 CAPSULE ORAL EVERY 12 HOURS
Refills: 0 | Status: DISCONTINUED | OUTPATIENT
Start: 2019-11-01 | End: 2019-11-01

## 2019-11-01 RX ADMIN — Medication 25 MILLIGRAM(S): at 10:44

## 2019-11-01 RX ADMIN — Medication 5 MILLILITER(S): at 17:32

## 2019-11-01 RX ADMIN — Medication 1 LOZENGE: at 08:34

## 2019-11-01 RX ADMIN — URSODIOL 300 MILLIGRAM(S): 250 TABLET, FILM COATED ORAL at 08:35

## 2019-11-01 RX ADMIN — VORICONAZOLE 200 MILLIGRAM(S): 10 INJECTION, POWDER, LYOPHILIZED, FOR SOLUTION INTRAVENOUS at 06:09

## 2019-11-01 RX ADMIN — MYCOPHENOLATE MOFETIL 1000 MILLIGRAM(S): 250 CAPSULE ORAL at 08:35

## 2019-11-01 RX ADMIN — VORICONAZOLE 200 MILLIGRAM(S): 10 INJECTION, POWDER, LYOPHILIZED, FOR SOLUTION INTRAVENOUS at 17:33

## 2019-11-01 RX ADMIN — Medication 5 MILLILITER(S): at 12:33

## 2019-11-01 RX ADMIN — Medication 1 LOZENGE: at 00:39

## 2019-11-01 RX ADMIN — Medication 650 MILLIGRAM(S): at 11:00

## 2019-11-01 RX ADMIN — MAGNESIUM OXIDE 400 MG ORAL TABLET 400 MILLIGRAM(S): 241.3 TABLET ORAL at 17:33

## 2019-11-01 RX ADMIN — Medication 1 MILLIGRAM(S): at 12:33

## 2019-11-01 RX ADMIN — Medication 50 MILLIGRAM(S): at 14:18

## 2019-11-01 RX ADMIN — Medication 1 LOZENGE: at 12:33

## 2019-11-01 RX ADMIN — Medication 10 MILLILITER(S): at 08:34

## 2019-11-01 RX ADMIN — Medication 650 MILLIGRAM(S): at 10:44

## 2019-11-01 RX ADMIN — FINASTERIDE 5 MILLIGRAM(S): 5 TABLET, FILM COATED ORAL at 12:33

## 2019-11-01 RX ADMIN — Medication 10 MILLILITER(S): at 12:33

## 2019-11-01 RX ADMIN — Medication 2.5 MILLIGRAM(S): at 06:09

## 2019-11-01 RX ADMIN — Medication 10 MILLILITER(S): at 17:32

## 2019-11-01 RX ADMIN — OXYCODONE HYDROCHLORIDE 5 MILLIGRAM(S): 5 TABLET ORAL at 06:09

## 2019-11-01 RX ADMIN — Medication 1 LOZENGE: at 17:32

## 2019-11-01 RX ADMIN — Medication 400 MILLIGRAM(S): at 06:09

## 2019-11-01 RX ADMIN — MAGNESIUM OXIDE 400 MG ORAL TABLET 400 MILLIGRAM(S): 241.3 TABLET ORAL at 12:33

## 2019-11-01 RX ADMIN — CHLORHEXIDINE GLUCONATE 1 APPLICATION(S): 213 SOLUTION TOPICAL at 08:35

## 2019-11-01 RX ADMIN — Medication 1 TABLET(S): at 12:33

## 2019-11-01 RX ADMIN — URSODIOL 300 MILLIGRAM(S): 250 TABLET, FILM COATED ORAL at 17:34

## 2019-11-01 RX ADMIN — Medication 400 MILLIGRAM(S): at 13:10

## 2019-11-01 RX ADMIN — Medication 12.5 MILLIGRAM(S): at 06:09

## 2019-11-01 RX ADMIN — Medication 5 MILLILITER(S): at 00:39

## 2019-11-01 RX ADMIN — TACROLIMUS 1 MILLIGRAM(S): 5 CAPSULE ORAL at 17:33

## 2019-11-01 RX ADMIN — PANTOPRAZOLE SODIUM 40 MILLIGRAM(S): 20 TABLET, DELAYED RELEASE ORAL at 06:08

## 2019-11-01 RX ADMIN — TACROLIMUS 1.5 MILLIGRAM(S): 5 CAPSULE ORAL at 06:09

## 2019-11-01 RX ADMIN — MAGNESIUM OXIDE 400 MG ORAL TABLET 400 MILLIGRAM(S): 241.3 TABLET ORAL at 08:35

## 2019-11-01 RX ADMIN — MYCOPHENOLATE MOFETIL 1000 MILLIGRAM(S): 250 CAPSULE ORAL at 13:09

## 2019-11-01 RX ADMIN — OXYCODONE HYDROCHLORIDE 5 MILLIGRAM(S): 5 TABLET ORAL at 06:48

## 2019-11-01 RX ADMIN — Medication 5 MILLILITER(S): at 08:34

## 2019-11-01 RX ADMIN — Medication 10 MILLILITER(S): at 00:39

## 2019-11-01 RX ADMIN — Medication 650 MILLIGRAM(S): at 16:45

## 2019-11-01 NOTE — CHART NOTE - NSCHARTNOTEFT_GEN_A_CORE
Right IJ TLC removed on 11/1/2019 at 1740  [facility] central line removal policy. Applied direct pressure to the site with sterile guaze. Applied sterile dressing after TLC removal.  Catheter intact  Protocol for site reassessment followed. Patient tolerated the procedure very well. No bleeding noted. Pt denies cough, hematoma, dizziness and respiratory distress.

## 2019-11-01 NOTE — DISCHARGE NOTE NURSING/CASE MANAGEMENT/SOCIAL WORK - NSDCPEEMAIL_GEN_ALL_CORE
[facility] for Tobacco Control email [email or web address]

## 2019-11-01 NOTE — PROGRESS NOTE ADULT - ATTENDING COMMENTS
59 year old male, with previously diagnosed acute myeloid leukemia. s/p induction chemotherapy with Daunorubicin/Cytarabine and HIDAC consolidation chemotherapy, now admitted for Haplo-identical stem cell transplant from son. Conditioning regimen of Fludarabine, Cytoxan, and TBI. Other history includes non-ischemic cardiomyopathy (recent EF 25% 9/19/19), COLLEEN, DVT/PE, HTN, Factor V Leiden and amputation of right toe due to osteomyelitis.  Day + 22, s/p haploPBSCT, with neutrophil engraftment  Started  Zarxio on day +5,  MMF and Tacrolimus. Check Tacrolimus levels on Monday and Thursday. D/C'd Zarxio with WBC recovery  Prior fevers likely due to haplostorm,  s/p CTX 2/2.  Follow temps if persist can dose 1 mg/kg of solumedrol.  Blood/urine cultures from 10/10 and 10/12 negative.  Continue voriconazole, acyclovir prophylaxis. Cefepime discontinued with neutrophil recovery; afebrile, cultures negative.  Strict I&O, daily weights, prn diuresis   H/O renal insufficiency- now resolved  Anxiolytics, antinausea, antidiarrhea medications as needed.  Nutritional support.  Hx non-ischemic CM, followed by Dr. Luke Lyn, last Echo 9/19 (EF 25%).  Monitor for fluid overload.  VOD prophylaxis - low dose heparin gtt (dosed at 100 units / kg / day), glutamine supplementation, Actigall BID   GI prophylaxis - Protonix po QD.  Electrolyte support  Aggressive mouth care and skin care as per protocol.  OOB/ambulate  Discharge planning 59 year old male, with previously diagnosed acute myeloid leukemia. s/p induction chemotherapy with Daunorubicin/Cytarabine and HIDAC consolidation chemotherapy, now admitted for Haplo-identical stem cell transplant from son. Conditioning regimen of Fludarabine, Cytoxan, and TBI. Other history includes non-ischemic cardiomyopathy (recent EF 25% 9/19/19), COLLEEN, DVT/PE, HTN, Factor V Leiden and amputation of right toe due to osteomyelitis.  Day + 23, s/p haploPBSCT, with neutrophil engraftment  Musculoskeletal pains likely secondary to engraftment process  Started  Zarxio on day +5,  MMF and Tacrolimus. Check Tacrolimus levels on Monday and Thursday. D/C'd Zarxio with WBC recovery  Prior fevers likely due to haplostorm,  s/p CTX 2/2.  Follow temps if persist can dose 1 mg/kg of solumedrol.  Blood/urine cultures from 10/10 and 10/12 negative.  Continue voriconazole, acyclovir prophylaxis. Cefepime discontinued with neutrophil recovery; afebrile, cultures negative.  Strict I&O, daily weights, prn diuresis   H/O renal insufficiency- now resolved  Anxiolytics, antinausea, antidiarrhea medications as needed.  Nutritional support.  Hx non-ischemic CM, followed by Dr. Luke Lyn, last Echo 9/19 (EF 25%).  Monitor for fluid overload.  VOD prophylaxis - low dose heparin gtt (dosed at 100 units / kg / day), glutamine supplementation, Actigall BID   GI prophylaxis - Protonix po QD.  Electrolyte support  Aggressive mouth care and skin care as per protocol.  OOB/ambulate  Discharge to home today and followup at [facility] as outpatient.

## 2019-11-01 NOTE — DISCHARGE NOTE NURSING/CASE MANAGEMENT/SOCIAL WORK - NSDCPEWEB_GEN_ALL_CORE
[facility] for Tobacco Control website --- [email or web address] --- [email or web address]

## 2019-11-01 NOTE — STUDENT SIGN OFF DOCUMENT - STUDENT DOCUMENT REVIEW
Chica Gonzales
Chica Gonzales, Dietetic Intern
Letty Kramer, Dietetic Intern

## 2019-11-01 NOTE — PROGRESS NOTE ADULT - SUBJECTIVE AND OBJECTIVE BOX
[facility] - Ede Zamudio, Riki, Edgar, Gem, Tabitha Garcia  Office Number:  [phone]    Patient resting comfortably in bed in NAD.  Laying flat with no respiratory distress.  No complaints of chest pain, dyspnea, palpitations, PND, or orthopnea.    F/U for:  Cardiomyopathy      MEDICATIONS  (STANDING):  acetaminophen   Tablet .. 650 milliGRAM(s) Oral every 6 hours  acetaminophen   Tablet .. 650 milliGRAM(s) Oral <User Schedule>  acyclovir   Oral Tab/Cap 400 milliGRAM(s) Oral every 8 hours  ALPRAZolam 1 milliGRAM(s) Oral at bedtime  Biotene Dry Mouth Oral Rinse 5 milliLiter(s) Swish and Spit five times a day  chlorhexidine 4% Liquid 1 Application(s) Topical <User Schedule>  clotrimazole Lozenge 1 Lozenge Oral five times a day  docusate sodium 100 milliGRAM(s) Oral three times a day  enalapril 2.5 milliGRAM(s) Oral daily  finasteride 5 milliGRAM(s) Oral daily  folic acid 1 milliGRAM(s) Oral daily  heparin  Infusion 339 Unit(s)/Hr (3.39 mL/Hr) IV Continuous <Continuous>  lidocaine/prilocaine Cream 1 Application(s) Topical daily  magnesium oxide 400 milliGRAM(s) Oral three times a day with meals  metoprolol succinate ER 12.5 milliGRAM(s) Oral two times a day  multivitamin 1 Tablet(s) Oral daily  mycophenolate mofetil 1000 milliGRAM(s) Oral <User Schedule>  pantoprazole    Tablet 40 milliGRAM(s) Oral before breakfast  senna 2 Tablet(s) Oral at bedtime  sertraline 50 milliGRAM(s) Oral at bedtime  sodium bicarbonate Mouth Rinse 10 milliLiter(s) Swish and Spit five times a day  sodium chloride 0.9%. 1000 milliLiter(s) (20 mL/Hr) IV Continuous <Continuous>  tacrolimus 1.5 milliGRAM(s) Oral two times a day  tamsulosin 0.4 milliGRAM(s) Oral at bedtime  ursodiol Capsule 300 milliGRAM(s) Oral two times a day with meals  voriconazole 200 milliGRAM(s) Oral every 12 hours    MEDICATIONS  (PRN):  acetaminophen   Tablet .. 650 milliGRAM(s) Oral every 6 hours PRN Temp greater or equal to 38C (100.4F), Mild Pain (1 - 3)  acetaminophen  IVPB .. 1000 milliGRAM(s) IV Intermittent once PRN Temp greater or equal to 38.5C (101.3F)  artificial tears (preservative free) Ophthalmic Solution 2 Drop(s) Both EYES two times a day PRN Dry Eyes  diphenhydrAMINE   Injectable 25 milliGRAM(s) IV Push every 4 hours PRN Allergy symptoms  metoclopramide Injectable 10 milliGRAM(s) IV Push every 6 hours PRN Nausea and/or Vomiting  ondansetron Injectable 8 milliGRAM(s) IV Push every 8 hours PRN Nausea and/or Vomiting  oxyCODONE    IR 5 milliGRAM(s) Oral every 6 hours PRN Moderate Pain (4 - 6)  sodium chloride 0.9% lock flush 10 milliLiter(s) IV Push every 1 hour PRN Pre/post blood products, medications, blood draw, and to maintain line patency      Allergies    No Known Allergies    Intolerances        Vital Signs Last 24 Hrs  T(C): 37.1 (01 Nov 2019 05:50), Max: 37.1 (01 Nov 2019 05:50)  T(F): 98.8 (01 Nov 2019 05:50), Max: 98.8 (01 Nov 2019 05:50)  HR: 82 (01 Nov 2019 05:50) (73 - 95)  BP: 121/68 (01 Nov 2019 05:50) (100/58 - 134/79)  BP(mean): --  RR: 18 (01 Nov 2019 05:50) (18 - 118)  SpO2: 97% (01 Nov 2019 05:50) (96% - 98%)    I&O's Summary    31 Oct 2019 07:01  -  01 Nov 2019 07:00  --------------------------------------------------------  IN: 2426.3 mL / OUT: 1700 mL / NET: 726.3 mL        ON EXAM:    General: NAD, awake and alert, oriented x 3  HEENT: Mucous membranes are moist, anicteric  Lungs: Non-labored, breath sounds are clear bilaterally, No wheezing, rales or rhonchi  Cardiovascular: Regular, S1 and S2, no murmurs, rubs, or gallops  Gastrointestinal: Bowel Sounds present, soft, nontender.   Lymph: No peripheral edema. No lymphadenopathy.  Skin: No rashes or ulcers  Psych:  Mood & affect appropriate    LABS: All Labs Reviewed:                        7.3    5.92  )-----------( 10       ( 31 Oct 2019 06:59 )             21.6                         7.3    7.72  )-----------( 11       ( 30 Oct 2019 07:00 )             21.7     31 Oct 2019 06:59    138    |  98     |  13     ----------------------------<  100    4.5     |  26     |  1.00   30 Oct 2019 07:00    140    |  105    |  14     ----------------------------<  107    4.7     |  23     |  0.97     Ca    9.2        31 Oct 2019 06:59  Ca    9.2        30 Oct 2019 07:00  Phos  4.2       31 Oct 2019 06:59  Phos  3.9       30 Oct 2019 07:00  Mg     1.5       31 Oct 2019 06:59  Mg     1.7       30 Oct 2019 07:00    TPro  5.6    /  Alb  3.3    /  TBili  0.2    /  DBili  x      /  AST  6      /  ALT  7      /  AlkPhos  87     31 Oct 2019 06:59  TPro  5.6    /  Alb  3.4    /  TBili  0.2    /  DBili  <0.1   /  AST  8      /  ALT  7      /  AlkPhos  88     30 Oct 2019 07:00    PT/INR - ( 31 Oct 2019 06:59 )   PT: 12.8 sec;   INR: 1.11 ratio         PTT - ( 31 Oct 2019 06:59 )  PTT:29.2 sec      Blood Culture:

## 2019-11-01 NOTE — CHART NOTE - NSCHARTNOTESELECT_GEN_ALL_CORE
Nutrition Services
Event Note
Event Note/Infusion note
Nutrition Services
TLC removal/Event Note

## 2019-11-01 NOTE — STUDENT SIGN OFF DOCUMENT - CO-SIGNATURE DATE
01-Nov-2019 16:39
11-Oct-2019 14:26
15-Oct-2019 14:57
18-Oct-2019 17:33
29-Oct-2019
22-Oct-2019 14:44

## 2019-11-01 NOTE — PROGRESS NOTE ADULT - ASSESSMENT
59 year old male, with previously diagnosed acute myeloid leukemia. s/p induction chemotherapy with Daunorubicin/Cytarabine and HIDAC consolidation chemotherapy, now admitted for Haplo-identical stem cell transplant from son:  Cardiac MRI in 9/2019 demonstrated LVEF 24%, with Mid wall myocardial late gadolinium enhancement at the base involving the anteroseptal and inferoseptal wall.    - No evidence of volume overload at present.     - Continue to maintain strict I's and O's, and to monitor for signs of volume overload, which he is at risk for.    - No evidence of acute ischemia  - BP is overall stable  - Continue ACEI and BB at current doses as tolerated by BP.    - On heparin gtt for hx of dvt/pe and hypercoagulable state, and adjust rate per protocol. No bleeding, though is clearly at risk.  AC on d/c per heme team.  - Monitor and replete lytes  - To follow while admitted  - upon d/c, he will follow up with Dr. Lyn in our office in a few weeks.

## 2019-11-01 NOTE — DISCHARGE NOTE NURSING/CASE MANAGEMENT/SOCIAL WORK - PATIENT PORTAL LINK FT
You can access the FollowMyHealth Patient Portal offered by [facility] by registering at the following website: [email or web address]. By joining [employer]’s FollowMyHealth portal, you will also be able to view your health information using other applications (apps) compatible with our system.

## 2019-11-01 NOTE — PROGRESS NOTE ADULT - NSHPATTENDINGPLANDISCUSS_GEN_ALL_CORE
team

## 2019-11-01 NOTE — PROGRESS NOTE ADULT - SUBJECTIVE AND OBJECTIVE BOX
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     S: Patient seen and examined with HPC Transplant Team:       + taste alterations and poor PO intake    Denies mouth, throat pain; cough, dyspnea, nausea, vomiting, diarrhea, abdominal pain      O: Vitals:   Vital Signs Last 24 Hrs  T(C): 37.1 (01 Nov 2019 05:50), Max: 37.1 (01 Nov 2019 05:50)  T(F): 98.8 (01 Nov 2019 05:50), Max: 98.8 (01 Nov 2019 05:50)  HR: 82 (01 Nov 2019 05:50) (73 - 95)  BP: 121/68 (01 Nov 2019 05:50) (100/58 - 134/79)  BP(mean): --  RR: 18 (01 Nov 2019 05:50) (18 - 118)  SpO2: 97% (01 Nov 2019 05:50) (96% - 98%)    Admit weight: 81.5 kg    Daily Weight in kG:     Intake / Output:   10-31 @ 07:01  -  11-01 @ 07:00  --------------------------------------------------------  IN: 2426.3 mL / OUT: 1700 mL / NET: 726.3 mL    PE:    Oropharynx: no ulceration or erythema  Oral Mucositis:   -                                             Grade: -  CVS: RRR, normal S1 S2  Lungs: CTA bilaterally   Abdomen: soft, + normoactive BS, NT, ND  Extremities: warm, no edema in BLE . Toe amputation  Gastric Mucositis:     -                                             Grade: -  Intestinal Mucositis:      -                                        Grade: -  Skin: patchy erythematous rash back and upper chest stable; no new rash  TLC:  C/D/I  Neuro: Alert and oriented x 3  Pain: none          Labs:   CBC Full  -  ( 01 Nov 2019 07:03 )  WBC Count : 6.30 K/uL  Hemoglobin : 7.8 g/dL  Hematocrit : 23.1 %  Platelet Count - Automated : 12 K/uL  Mean Cell Volume : 86.2 fl  Mean Cell Hemoglobin : 29.1 pg  Mean Cell Hemoglobin Concentration : 33.8 gm/dL  Auto Neutrophil # : 3.73 K/uL  Auto Lymphocyte # : 0.01 K/uL  Auto Monocyte # : 2.26 K/uL  Auto Eosinophil # : 0.00 K/uL  Auto Basophil # : 0.01 K/uL  Auto Neutrophil % : 59.1 %  Auto Lymphocyte % : 0.2 %  Auto Monocyte % : 35.9 %  Auto Eosinophil % : 0.0 %  Auto Basophil % : 0.2 %                          7.8    6.30  )-----------( 12       ( 01 Nov 2019 07:03 )             23.1     11-01    135  |  105  |  14  ----------------------------<  94  4.5   |  23  |  0.98    Ca    9.1      01 Nov 2019 06:58  Phos  3.9     11-01  Mg     1.7     11-01    TPro  5.7<L>  /  Alb  3.3  /  TBili  0.2  /  DBili  x   /  AST  8<L>  /  ALT  6<L>  /  AlkPhos  88  11-01    PT/INR - ( 31 Oct 2019 06:59 )   PT: 12.8 sec;   INR: 1.11 ratio         PTT - ( 31 Oct 2019 06:59 )  PTT:29.2 sec  LIVER FUNCTIONS - ( 01 Nov 2019 06:58 )  Alb: 3.3 g/dL / Pro: 5.7 g/dL / ALK PHOS: 88 U/L / ALT: 6 U/L / AST: 8 U/L / GGT: x           Lactate Dehydrogenase, Serum: 138 U/L (11-01 @ 06:58)       10-31 @ 08:34  Tacrolimus 9.7                Cyclosporine --                 Meds:   Antimicrobials:   acyclovir   Oral Tab/Cap 400 milliGRAM(s) Oral every 8 hours  clotrimazole Lozenge 1 Lozenge Oral five times a day  voriconazole 200 milliGRAM(s) Oral every 12 hours      Heme / Onc:   heparin  Infusion 339 Unit(s)/Hr IV Continuous <Continuous>      GI:  docusate sodium 100 milliGRAM(s) Oral three times a day  pantoprazole    Tablet 40 milliGRAM(s) Oral before breakfast  senna 2 Tablet(s) Oral at bedtime  sodium bicarbonate Mouth Rinse 10 milliLiter(s) Swish and Spit five times a day  ursodiol Capsule 300 milliGRAM(s) Oral two times a day with meals      Cardiovascular:   enalapril 2.5 milliGRAM(s) Oral daily  metoprolol succinate ER 12.5 milliGRAM(s) Oral two times a day  tamsulosin 0.4 milliGRAM(s) Oral at bedtime      Immunologic:   mycophenolate mofetil 1000 milliGRAM(s) Oral <User Schedule>  tacrolimus 1.5 milliGRAM(s) Oral two times a day      Other medications:   acetaminophen   Tablet .. 650 milliGRAM(s) Oral every 6 hours  acetaminophen   Tablet .. 650 milliGRAM(s) Oral <User Schedule>  ALPRAZolam 1 milliGRAM(s) Oral at bedtime  Biotene Dry Mouth Oral Rinse 5 milliLiter(s) Swish and Spit five times a day  chlorhexidine 4% Liquid 1 Application(s) Topical <User Schedule>  finasteride 5 milliGRAM(s) Oral daily  folic acid 1 milliGRAM(s) Oral daily  lidocaine/prilocaine Cream 1 Application(s) Topical daily  magnesium oxide 400 milliGRAM(s) Oral three times a day with meals  multivitamin 1 Tablet(s) Oral daily  sertraline 50 milliGRAM(s) Oral at bedtime  sodium chloride 0.9%. 1000 milliLiter(s) IV Continuous <Continuous>      PRN:   acetaminophen   Tablet .. 650 milliGRAM(s) Oral every 6 hours PRN  acetaminophen  IVPB .. 1000 milliGRAM(s) IV Intermittent once PRN  artificial tears (preservative free) Ophthalmic Solution 2 Drop(s) Both EYES two times a day PRN  diphenhydrAMINE   Injectable 25 milliGRAM(s) IV Push every 4 hours PRN  metoclopramide Injectable 10 milliGRAM(s) IV Push every 6 hours PRN  ondansetron Injectable 8 milliGRAM(s) IV Push every 8 hours PRN  oxyCODONE    IR 5 milliGRAM(s) Oral every 6 hours PRN  sodium chloride 0.9% lock flush 10 milliLiter(s) IV Push every 1 hour PRN      A/P:   59 year old male with a history of AML  Post Allogeneic PBSCT day +23  Strict I&O, daily weights, prn diuresis   Cardiology following   10/14- Started Tacro, Cellcept and Zarxio  Afebrile, neutropenic - continue Cefepime.  10/11- Patient will need split units of  PRBC if need to be transfused due to low EF.  10/23-  Platelets transfusion reaction yesterday, transfusion workup completed.  10/27- Engrafting.  Poor PO intake, continue calorie count for 48 hours.  Zarxio discontinued after dose on 10/29.    1. Infectious Disease:   acyclovir   Oral Tab/Cap 400 milliGRAM(s) Oral every 8 hours  clotrimazole Lozenge 1 Lozenge Oral five times a day  voriconazole 200 milliGRAM(s) Oral every 12 hours    2. VOD Prophylaxis: Actigall, Glutamine, Heparin (dosed at 100 units / kg / day)     3. GI Prophylaxis:    pantoprazole    Tablet 40 milliGRAM(s) Oral before breakfast    4. Mouth care - NS / NaHCO3 rinses, Mycelex, Biotene; Skin care     5. GVHD prophylaxis   mycophenolate mofetil 1000 milliGRAM(s) Oral <User Schedule>  tacrolimus 1.5 milliGRAM(s) Oral two times a day  CTX days + 3, + 4     6. Transfuse & replete electrolytes prn   magnesium oxide 400 milliGRAM(s) Oral three times a day with meals      7. IV hydration, daily weights, strict I&O, prn diuresis     8. PO intake as tolerated, nutrition follow up as needed, MVI, folic acid     9. Antiemetics, anti-diarrhea medications:   metoclopramide Injectable 10 milliGRAM(s) IV Push every 6 hours PRN  ondansetron Injectable 8 milliGRAM(s) IV Push every 8 hours PRN    10. OOB as tolerated, physical therapy consult if needed     11. Monitor coags / fibrinogen 2x week, vitamin K as needed     12. Monitor closely for clinical changes, monitor for fevers     13. Emotional support provided, plan of care discussed and questions addressed     14. Patient education done regarding chemotherapy prep, plan of care, restrictions and discharge planning     15. Continue regular social work input      I have written the above note for Dr. Quiñones who performed service with me in the room.   Suki Sage NP-C ([phone])    I have seen and examined patient with NP, I agree with above note as scribed. 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     S: Patient seen and examined with HPC Transplant Team:   +Loose stools  + back pain  + Shoulder pain   + taste alterations and poor PO intake    Denies mouth, throat pain; cough, dyspnea, nausea, vomiting,  abdominal pain      O: Vitals:   Vital Signs Last 24 Hrs  T(C): 37.1 (2019 05:50), Max: 37.1 (2019 05:50)  T(F): 98.8 (2019 05:50), Max: 98.8 (2019 05:50)  HR: 82 (2019 05:50) (73 - 95)  BP: 121/68 (2019 05:50) (100/58 - 134/79)  BP(mean): --  RR: 18 (2019 05:50) (18 - 118)  SpO2: 97% (2019 05:50) (96% - 98%)    Admit weight: 81.5 kg    Daily Weight in k.5 Kg (2019 )    Intake / Output:   10-31 @ 07:  -   @ 07:00  --------------------------------------------------------  IN: 2426.3 mL / OUT: 1700 mL / NET: 726.3 mL    PE:    Oropharynx: no ulceration or erythema  Oral Mucositis:   -                                             Grade: -  CVS: RRR, normal S1 S2  Lungs: CTA bilaterally   Abdomen: soft, + normoactive BS, NT, ND  Extremities: warm, no edema in BLE . Toe amputation  Gastric Mucositis:     -                                             Grade: -  Intestinal Mucositis:      -                                        Grade: -  Skin: patchy erythematous rash back and upper chest stable; no new rash  TLC:  C/D/I  Neuro: Alert and oriented x 3  Pain: back/shoulder pain 5/10          Labs:   CBC Full  -  ( 2019 07:03 )  WBC Count : 6.30 K/uL  Hemoglobin : 7.8 g/dL  Hematocrit : 23.1 %  Platelet Count - Automated : 12 K/uL  Mean Cell Volume : 86.2 fl  Mean Cell Hemoglobin : 29.1 pg  Mean Cell Hemoglobin Concentration : 33.8 gm/dL  Auto Neutrophil # : 3.73 K/uL  Auto Lymphocyte # : 0.01 K/uL  Auto Monocyte # : 2.26 K/uL  Auto Eosinophil # : 0.00 K/uL  Auto Basophil # : 0.01 K/uL  Auto Neutrophil % : 59.1 %  Auto Lymphocyte % : 0.2 %  Auto Monocyte % : 35.9 %  Auto Eosinophil % : 0.0 %  Auto Basophil % : 0.2 %                          7.8    6.30  )-----------( 12       ( 2019 07:03 )             23.1         135  |  105  |  14  ----------------------------<  94  4.5   |  23  |  0.98    Ca    9.1      2019 06:58  Phos  3.9       Mg     1.7         TPro  5.7<L>  /  Alb  3.3  /  TBili  0.2  /  DBili  x   /  AST  8<L>  /  ALT  6<L>  /  AlkPhos  88      PT/INR - ( 31 Oct 2019 06:59 )   PT: 12.8 sec;   INR: 1.11 ratio         PTT - ( 31 Oct 2019 06:59 )  PTT:29.2 sec  LIVER FUNCTIONS - ( 2019 06:58 )  Alb: 3.3 g/dL / Pro: 5.7 g/dL / ALK PHOS: 88 U/L / ALT: 6 U/L / AST: 8 U/L / GGT: x           Lactate Dehydrogenase, Serum: 138 U/L ( @ 06:58)       10-31 @ 08:34  Tacrolimus 9.7                Cyclosporine --                 Meds:   Antimicrobials:   acyclovir   Oral Tab/Cap 400 milliGRAM(s) Oral every 8 hours  clotrimazole Lozenge 1 Lozenge Oral five times a day  voriconazole 200 milliGRAM(s) Oral every 12 hours      Heme / Onc:   heparin  Infusion 339 Unit(s)/Hr IV Continuous <Continuous>      GI:  docusate sodium 100 milliGRAM(s) Oral three times a day  pantoprazole    Tablet 40 milliGRAM(s) Oral before breakfast  senna 2 Tablet(s) Oral at bedtime  sodium bicarbonate Mouth Rinse 10 milliLiter(s) Swish and Spit five times a day  ursodiol Capsule 300 milliGRAM(s) Oral two times a day with meals      Cardiovascular:   enalapril 2.5 milliGRAM(s) Oral daily  metoprolol succinate ER 12.5 milliGRAM(s) Oral two times a day  tamsulosin 0.4 milliGRAM(s) Oral at bedtime      Immunologic:   mycophenolate mofetil 1000 milliGRAM(s) Oral <User Schedule>  tacrolimus 1.5 milliGRAM(s) Oral two times a day      Other medications:   acetaminophen   Tablet .. 650 milliGRAM(s) Oral every 6 hours  acetaminophen   Tablet .. 650 milliGRAM(s) Oral <User Schedule>  ALPRAZolam 1 milliGRAM(s) Oral at bedtime  Biotene Dry Mouth Oral Rinse 5 milliLiter(s) Swish and Spit five times a day  chlorhexidine 4% Liquid 1 Application(s) Topical <User Schedule>  finasteride 5 milliGRAM(s) Oral daily  folic acid 1 milliGRAM(s) Oral daily  lidocaine/prilocaine Cream 1 Application(s) Topical daily  magnesium oxide 400 milliGRAM(s) Oral three times a day with meals  multivitamin 1 Tablet(s) Oral daily  sertraline 50 milliGRAM(s) Oral at bedtime  sodium chloride 0.9%. 1000 milliLiter(s) IV Continuous <Continuous>      PRN:   acetaminophen   Tablet .. 650 milliGRAM(s) Oral every 6 hours PRN  acetaminophen  IVPB .. 1000 milliGRAM(s) IV Intermittent once PRN  artificial tears (preservative free) Ophthalmic Solution 2 Drop(s) Both EYES two times a day PRN  diphenhydrAMINE   Injectable 25 milliGRAM(s) IV Push every 4 hours PRN  metoclopramide Injectable 10 milliGRAM(s) IV Push every 6 hours PRN  ondansetron Injectable 8 milliGRAM(s) IV Push every 8 hours PRN  oxyCODONE    IR 5 milliGRAM(s) Oral every 6 hours PRN  sodium chloride 0.9% lock flush 10 milliLiter(s) IV Push every 1 hour PRN      A/P:   59 year old male with a history of AML  Post Allogeneic PBSCT day +23  Strict I&O, daily weights, prn diuresis   Cardiology following   10/14- Started Tacro, Cellcept and Zarxio  Afebrile, neutropenic - continue Cefepime.  10/11- Patient will need split units of  PRBC if need to be transfused due to low EF.  10/23-  Platelets transfusion reaction yesterday, transfusion workup completed.  10/27- Engrafting.  Poor PO intake, continue calorie count for 48 hours.  Zarxio discontinued after dose on 10/29.  Discharge planning today    1. Infectious Disease:   acyclovir   Oral Tab/Cap 400 milliGRAM(s) Oral every 8 hours  clotrimazole Lozenge 1 Lozenge Oral five times a day  voriconazole 200 milliGRAM(s) Oral every 12 hours    2. VOD Prophylaxis: Actigall, Glutamine, Heparin (dosed at 100 units / kg / day)     3. GI Prophylaxis:    pantoprazole    Tablet 40 milliGRAM(s) Oral before breakfast    4. Mouth care - NS / NaHCO3 rinses, Mycelex, Biotene; Skin care     5. GVHD prophylaxis   mycophenolate mofetil 1000 milliGRAM(s) Oral <User Schedule>  tacrolimus 1.5 milliGRAM(s) Oral two times a day  CTX days + 3, + 4     6. Transfuse & replete electrolytes prn   magnesium oxide 400 milliGRAM(s) Oral three times a day with meals      7. IV hydration, daily weights, strict I&O, prn diuresis     8. PO intake as tolerated, nutrition follow up as needed, MVI, folic acid     9. Antiemetics, anti-diarrhea medications:   metoclopramide Injectable 10 milliGRAM(s) IV Push every 6 hours PRN  ondansetron Injectable 8 milliGRAM(s) IV Push every 8 hours PRN    10. OOB as tolerated, physical therapy consult if needed     11. Monitor coags / fibrinogen 2x week, vitamin K as needed     12. Monitor closely for clinical changes, monitor for fevers     13. Emotional support provided, plan of care discussed and questions addressed     14. Patient education done regarding chemotherapy prep, plan of care, restrictions and discharge planning     15. Continue regular social work input      I have written the above note for Dr. Quioñnes who performed service with me in the room.   Suki Sage NP-C ([phone])    I have seen and examined patient with NP, I agree with above note as scribed. 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     S: Patient seen and examined with HPC Transplant Team:   +Loose stools  + back pain  + Shoulder pain   + taste alterations and poor PO intake    Denies mouth, throat pain; cough, dyspnea, nausea, vomiting,  abdominal pain      O: Vitals:   Vital Signs Last 24 Hrs  T(C): 37.1 (2019 05:50), Max: 37.1 (2019 05:50)  T(F): 98.8 (2019 05:50), Max: 98.8 (2019 05:50)  HR: 82 (2019 05:50) (73 - 95)  BP: 121/68 (2019 05:50) (100/58 - 134/79)  BP(mean): --  RR: 18 (2019 05:50) (18 - 118)  SpO2: 97% (2019 05:50) (96% - 98%)    Admit weight: 81.5 kg    Daily Weight in k.5 Kg (2019 )    Intake / Output:   10-31 @ 07:  -   @ 07:00  --------------------------------------------------------  IN: 2426.3 mL / OUT: 1700 mL / NET: 726.3 mL    PE:    Oropharynx: no ulceration or erythema  Oral Mucositis:   -                                             Grade: -  CVS: RRR, normal S1 S2  Lungs: CTA bilaterally   Abdomen: soft, + normoactive BS, NT, ND  Extremities: warm, no edema in BLE . Toe amputation  Gastric Mucositis:     -                                             Grade: -  Intestinal Mucositis:      -                                        Grade: -  Skin: patchy erythematous rash back and upper chest stable; no new rash  TLC:  C/D/I  Neuro: Alert and oriented x 3  Pain: back/shoulder pain 5/10          Labs:   CBC Full  -  ( 2019 07:03 )  WBC Count : 6.30 K/uL  Hemoglobin : 7.8 g/dL  Hematocrit : 23.1 %  Platelet Count - Automated : 12 K/uL  Mean Cell Volume : 86.2 fl  Mean Cell Hemoglobin : 29.1 pg  Mean Cell Hemoglobin Concentration : 33.8 gm/dL  Auto Neutrophil # : 3.73 K/uL  Auto Lymphocyte # : 0.01 K/uL  Auto Monocyte # : 2.26 K/uL  Auto Eosinophil # : 0.00 K/uL  Auto Basophil # : 0.01 K/uL  Auto Neutrophil % : 59.1 %  Auto Lymphocyte % : 0.2 %  Auto Monocyte % : 35.9 %  Auto Eosinophil % : 0.0 %  Auto Basophil % : 0.2 %                          7.8    6.30  )-----------( 12       ( 2019 07:03 )             23.1         135  |  105  |  14  ----------------------------<  94  4.5   |  23  |  0.98    Ca    9.1      2019 06:58  Phos  3.9       Mg     1.7         TPro  5.7<L>  /  Alb  3.3  /  TBili  0.2  /  DBili  x   /  AST  8<L>  /  ALT  6<L>  /  AlkPhos  88      PT/INR - ( 31 Oct 2019 06:59 )   PT: 12.8 sec;   INR: 1.11 ratio         PTT - ( 31 Oct 2019 06:59 )  PTT:29.2 sec  LIVER FUNCTIONS - ( 2019 06:58 )  Alb: 3.3 g/dL / Pro: 5.7 g/dL / ALK PHOS: 88 U/L / ALT: 6 U/L / AST: 8 U/L / GGT: x           Lactate Dehydrogenase, Serum: 138 U/L ( @ 06:58)       10-31 @ 08:34  Tacrolimus 9.7                Cyclosporine --                 Meds:   Antimicrobials:   acyclovir   Oral Tab/Cap 400 milliGRAM(s) Oral every 8 hours  clotrimazole Lozenge 1 Lozenge Oral five times a day  voriconazole 200 milliGRAM(s) Oral every 12 hours      Heme / Onc:   heparin  Infusion 339 Unit(s)/Hr IV Continuous <Continuous>      GI:  docusate sodium 100 milliGRAM(s) Oral three times a day  pantoprazole    Tablet 40 milliGRAM(s) Oral before breakfast  senna 2 Tablet(s) Oral at bedtime  sodium bicarbonate Mouth Rinse 10 milliLiter(s) Swish and Spit five times a day  ursodiol Capsule 300 milliGRAM(s) Oral two times a day with meals      Cardiovascular:   enalapril 2.5 milliGRAM(s) Oral daily  metoprolol succinate ER 12.5 milliGRAM(s) Oral two times a day  tamsulosin 0.4 milliGRAM(s) Oral at bedtime      Immunologic:   mycophenolate mofetil 1000 milliGRAM(s) Oral <User Schedule>  tacrolimus 1.5 milliGRAM(s) Oral two times a day      Other medications:   acetaminophen   Tablet .. 650 milliGRAM(s) Oral every 6 hours  acetaminophen   Tablet .. 650 milliGRAM(s) Oral <User Schedule>  ALPRAZolam 1 milliGRAM(s) Oral at bedtime  Biotene Dry Mouth Oral Rinse 5 milliLiter(s) Swish and Spit five times a day  chlorhexidine 4% Liquid 1 Application(s) Topical <User Schedule>  finasteride 5 milliGRAM(s) Oral daily  folic acid 1 milliGRAM(s) Oral daily  lidocaine/prilocaine Cream 1 Application(s) Topical daily  magnesium oxide 400 milliGRAM(s) Oral three times a day with meals  multivitamin 1 Tablet(s) Oral daily  sertraline 50 milliGRAM(s) Oral at bedtime  sodium chloride 0.9%. 1000 milliLiter(s) IV Continuous <Continuous>      PRN:   acetaminophen   Tablet .. 650 milliGRAM(s) Oral every 6 hours PRN  acetaminophen  IVPB .. 1000 milliGRAM(s) IV Intermittent once PRN  artificial tears (preservative free) Ophthalmic Solution 2 Drop(s) Both EYES two times a day PRN  diphenhydrAMINE   Injectable 25 milliGRAM(s) IV Push every 4 hours PRN  metoclopramide Injectable 10 milliGRAM(s) IV Push every 6 hours PRN  ondansetron Injectable 8 milliGRAM(s) IV Push every 8 hours PRN  oxyCODONE    IR 5 milliGRAM(s) Oral every 6 hours PRN  sodium chloride 0.9% lock flush 10 milliLiter(s) IV Push every 1 hour PRN      A/P:   59 year old male with a history of AML  Post Allogeneic PBSCT day +23  Strict I&O, daily weights, prn diuresis   Cardiology following   10/14- Started Tacro, Cellcept and Zarxio  Afebrile, neutropenic - continue Cefepime.  10/11- Patient will need split units of  PRBC if need to be transfused due to low EF.  10/23-  Platelets transfusion reaction yesterday, transfusion workup completed.  10/27- Engrafting.  Poor PO intake, continue calorie count for 48 hours.  Zarxio discontinued after dose on 10/29.  Discharge planning today or Monday     1. Infectious Disease:   acyclovir   Oral Tab/Cap 400 milliGRAM(s) Oral every 8 hours  clotrimazole Lozenge 1 Lozenge Oral five times a day  voriconazole 200 milliGRAM(s) Oral every 12 hours    2. VOD Prophylaxis: Actigall, Glutamine, Heparin (dosed at 100 units / kg / day)     3. GI Prophylaxis:    pantoprazole    Tablet 40 milliGRAM(s) Oral before breakfast    4. Mouth care - NS / NaHCO3 rinses, Mycelex, Biotene; Skin care     5. GVHD prophylaxis   mycophenolate mofetil 1000 milliGRAM(s) Oral <User Schedule>  tacrolimus 1.5 milliGRAM(s) Oral two times a day  CTX days + 3, + 4     6. Transfuse & replete electrolytes prn   magnesium oxide 400 milliGRAM(s) Oral three times a day with meals  Anemia PRBC x 1/2 unit       7. IV hydration, daily weights, strict I&O, prn diuresis     8. PO intake as tolerated, nutrition follow up as needed, MVI, folic acid     9. Antiemetics, anti-diarrhea medications:   metoclopramide Injectable 10 milliGRAM(s) IV Push every 6 hours PRN  ondansetron Injectable 8 milliGRAM(s) IV Push every 8 hours PRN    10. OOB as tolerated, physical therapy consult if needed     11. Monitor coags / fibrinogen 2x week, vitamin K as needed     12. Monitor closely for clinical changes, monitor for fevers     13. Emotional support provided, plan of care discussed and questions addressed     14. Patient education done regarding chemotherapy prep, plan of care, restrictions and discharge planning     15. Continue regular social work input      I have written the above note for Dr. Quiñones who performed service with me in the room.   Suki Sage NP-C ([phone])    I have seen and examined patient with NP, I agree with above note as scribed. 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     S: Patient seen and examined with HPC Transplant Team:   +Loose stools  + back pain  + Shoulder pain   + taste alterations and poor PO intake    Denies mouth, throat pain; cough, dyspnea, nausea, vomiting,  abdominal pain      O: Vitals:   Vital Signs Last 24 Hrs  T(C): 37.1 (2019 05:50), Max: 37.1 (2019 05:50)  T(F): 98.8 (2019 05:50), Max: 98.8 (2019 05:50)  HR: 82 (2019 05:50) (73 - 95)  BP: 121/68 (2019 05:50) (100/58 - 134/79)  BP(mean): --  RR: 18 (2019 05:50) (18 - 118)  SpO2: 97% (2019 05:50) (96% - 98%)    Admit weight: 81.5 kg    Daily Weight in k.5 Kg (2019 )    Intake / Output:   10-31 @ 07:  -   @ 07:00  --------------------------------------------------------  IN: 2426.3 mL / OUT: 1700 mL / NET: 726.3 mL    PE:    Oropharynx: no ulceration or erythema  Oral Mucositis:   -                                             Grade: -  CVS: RRR, normal S1 S2  Lungs: CTA bilaterally   Abdomen: soft, + normoactive BS, NT, ND  Extremities: warm, no edema in BLE . Toe amputation  Gastric Mucositis:     -                                             Grade: -  Intestinal Mucositis:      -                                        Grade: -  Skin: patchy erythematous rash back and upper chest stable; no new rash  TLC:  C/D/I  Neuro: Alert and oriented x 3  Pain: back/shoulder pain 5/10          Labs:   CBC Full  -  ( 2019 07:03 )  WBC Count : 6.30 K/uL  Hemoglobin : 7.8 g/dL  Hematocrit : 23.1 %  Platelet Count - Automated : 12 K/uL  Mean Cell Volume : 86.2 fl  Mean Cell Hemoglobin : 29.1 pg  Mean Cell Hemoglobin Concentration : 33.8 gm/dL  Auto Neutrophil # : 3.73 K/uL  Auto Lymphocyte # : 0.01 K/uL  Auto Monocyte # : 2.26 K/uL  Auto Eosinophil # : 0.00 K/uL  Auto Basophil # : 0.01 K/uL  Auto Neutrophil % : 59.1 %  Auto Lymphocyte % : 0.2 %  Auto Monocyte % : 35.9 %  Auto Eosinophil % : 0.0 %  Auto Basophil % : 0.2 %                          7.8    6.30  )-----------( 12       ( 2019 07:03 )             23.1         135  |  105  |  14  ----------------------------<  94  4.5   |  23  |  0.98    Ca    9.1      2019 06:58  Phos  3.9       Mg     1.7         TPro  5.7<L>  /  Alb  3.3  /  TBili  0.2  /  DBili  x   /  AST  8<L>  /  ALT  6<L>  /  AlkPhos  88      PT/INR - ( 31 Oct 2019 06:59 )   PT: 12.8 sec;   INR: 1.11 ratio         PTT - ( 31 Oct 2019 06:59 )  PTT:29.2 sec  LIVER FUNCTIONS - ( 2019 06:58 )  Alb: 3.3 g/dL / Pro: 5.7 g/dL / ALK PHOS: 88 U/L / ALT: 6 U/L / AST: 8 U/L / GGT: x           Lactate Dehydrogenase, Serum: 138 U/L ( @ 06:58)      Tacrolimus (), Serum: 10.2: Tacrolimus testing is performed on the [employer]  by  chemiluminescent microparticle immunoassay. The therapeutic range of  tacrolimus is not clearly defined but target 12-hour trough whole blood  concentrations are 5.0 - 20.0 ng/mL early post transplant. Twenty-four  hour  trough concentrations are 33-50% less than the corresponding 12-hour  trough. ng/mL (19 @ 09:51)                                    Meds:   Antimicrobials:   acyclovir   Oral Tab/Cap 400 milliGRAM(s) Oral every 8 hours  clotrimazole Lozenge 1 Lozenge Oral five times a day  voriconazole 200 milliGRAM(s) Oral every 12 hours      Heme / Onc:   heparin  Infusion 339 Unit(s)/Hr IV Continuous <Continuous>      GI:  docusate sodium 100 milliGRAM(s) Oral three times a day  pantoprazole    Tablet 40 milliGRAM(s) Oral before breakfast  senna 2 Tablet(s) Oral at bedtime  sodium bicarbonate Mouth Rinse 10 milliLiter(s) Swish and Spit five times a day  ursodiol Capsule 300 milliGRAM(s) Oral two times a day with meals      Cardiovascular:   enalapril 2.5 milliGRAM(s) Oral daily  metoprolol succinate ER 12.5 milliGRAM(s) Oral two times a day  tamsulosin 0.4 milliGRAM(s) Oral at bedtime      Immunologic:   mycophenolate mofetil 1000 milliGRAM(s) Oral <User Schedule>  tacrolimus 1.5 milliGRAM(s) Oral two times a day      Other medications:   acetaminophen   Tablet .. 650 milliGRAM(s) Oral every 6 hours  acetaminophen   Tablet .. 650 milliGRAM(s) Oral <User Schedule>  ALPRAZolam 1 milliGRAM(s) Oral at bedtime  Biotene Dry Mouth Oral Rinse 5 milliLiter(s) Swish and Spit five times a day  chlorhexidine 4% Liquid 1 Application(s) Topical <User Schedule>  finasteride 5 milliGRAM(s) Oral daily  folic acid 1 milliGRAM(s) Oral daily  lidocaine/prilocaine Cream 1 Application(s) Topical daily  magnesium oxide 400 milliGRAM(s) Oral three times a day with meals  multivitamin 1 Tablet(s) Oral daily  sertraline 50 milliGRAM(s) Oral at bedtime  sodium chloride 0.9%. 1000 milliLiter(s) IV Continuous <Continuous>      PRN:   acetaminophen   Tablet .. 650 milliGRAM(s) Oral every 6 hours PRN  acetaminophen  IVPB .. 1000 milliGRAM(s) IV Intermittent once PRN  artificial tears (preservative free) Ophthalmic Solution 2 Drop(s) Both EYES two times a day PRN  diphenhydrAMINE   Injectable 25 milliGRAM(s) IV Push every 4 hours PRN  metoclopramide Injectable 10 milliGRAM(s) IV Push every 6 hours PRN  ondansetron Injectable 8 milliGRAM(s) IV Push every 8 hours PRN  oxyCODONE    IR 5 milliGRAM(s) Oral every 6 hours PRN  sodium chloride 0.9% lock flush 10 milliLiter(s) IV Push every 1 hour PRN      A/P:   59 year old male with a history of AML  Post Allogeneic PBSCT day +23  Strict I&O, daily weights, prn diuresis   Cardiology following   10/14- Started Tacro, Cellcept and Zarxio  Afebrile, neutropenic - continue Cefepime.  10/11- Patient will need split units of  PRBC if need to be transfused due to low EF.  10/23-  Platelets transfusion reaction yesterday, transfusion workup completed.  10/27- Engrafting.  Poor PO intake, continue calorie count for 48 hours.  Zarxio discontinued after dose on 10/29.  Discharge planning today or Monday     1. Infectious Disease:   acyclovir   Oral Tab/Cap 400 milliGRAM(s) Oral every 8 hours  clotrimazole Lozenge 1 Lozenge Oral five times a day  voriconazole 200 milliGRAM(s) Oral every 12 hours    2. VOD Prophylaxis: Actigall, Glutamine, Heparin (dosed at 100 units / kg / day)     3. GI Prophylaxis:    pantoprazole    Tablet 40 milliGRAM(s) Oral before breakfast    4. Mouth care - NS / NaHCO3 rinses, Mycelex, Biotene; Skin care     5. GVHD prophylaxis   mycophenolate mofetil 1000 milliGRAM(s) Oral <User Schedule>  tacrolimus 1.5 milliGRAM(s) Oral two times a day  CTX days + 3, + 4     6. Transfuse & replete electrolytes prn   magnesium oxide 400 milliGRAM(s) Oral three times a day with meals  Anemia PRBC x 1/2 unit       7. IV hydration, daily weights, strict I&O, prn diuresis     8. PO intake as tolerated, nutrition follow up as needed, MVI, folic acid     9. Antiemetics, anti-diarrhea medications:   metoclopramide Injectable 10 milliGRAM(s) IV Push every 6 hours PRN  ondansetron Injectable 8 milliGRAM(s) IV Push every 8 hours PRN    10. OOB as tolerated, physical therapy consult if needed     11. Monitor coags / fibrinogen 2x week, vitamin K as needed     12. Monitor closely for clinical changes, monitor for fevers     13. Emotional support provided, plan of care discussed and questions addressed     14. Patient education done regarding chemotherapy prep, plan of care, restrictions and discharge planning     15. Continue regular social work input      I have written the above note for Dr. Quiñones who performed service with me in the room.   Suki Sage NP-C ([phone])    I have seen and examined patient with NP, I agree with above note as scribed.

## 2019-11-01 NOTE — CHART NOTE - NSCHARTNOTEFT_GEN_A_CORE
Pt seen for Calorie Count Day 3 Follow Up  Pt with AML Day +23 S/P haploidentical PBSCT, engrafted    Information per patient, medical record, RN,  and previous RD notes.    Calorie Count Day 3 Results:    Pt S/P 3 day calorie count 10/29-10/31 Day 1: 580 Kcal, 18g protein, Day 2: 1280 Kcal, 52g protein, Day 3: 1025 Kcal, 31g protein.  3 day average: 962 Kcal, 34g protein meets 39% of low end calorie needs and 30% of low end protein needs.    Pt reports consumption of a yogurt smoothie and 2 milkshakes from the [employer] program today (11/01). Reinforced Food Handling Safety education. Pt seen for Calorie Count Day 3 Follow Up  Pt with AML Day +23 S/P haploidentical PBSCT, engrafted    Information per patient, medical record, RN,  and previous RD notes.    Calorie Count Day 3 Results:    Pt S/P 3 day calorie count 10/29-10/31 Day 1: 580 Kcal, 18g protein, Day 2: 1280 Kcal, 52g protein, Day 3: 1025 Kcal, 31g protein.  3 day average: 962 Kcal, 34g protein meets 39% of low end calorie needs and 30% of low end protein needs. Noted marked improvement from Day 1. Majority of calories coming from milkshakes at this time.     Estimated needs based on admission weight 81.4 Kg    0720-7331 Kcal (30-35 Kcal/Kg)  114-130g protein (1.4-1.6g protein/Kg)    Pt reports consumption of a yogurt smoothie and 2 milkshakes from the [employer] program today (11/01). Pt excited to go home; states he will eat better once there, looking forward to a home cooked meal tonight. Reinforced Food Handling Safety education.    RD to remain available.  Will follow up per protocol.  Chica Gonzales, Dietetic Intern (Pager #[phone])

## 2019-11-01 NOTE — DISCHARGE NOTE NURSING/CASE MANAGEMENT/SOCIAL WORK - NSDCFUADDAPPT_GEN_ALL_CORE_FT
You have appointments at [facility] as follows:  11/4: Possible platelets 12 PM  11/7: Follow-up with YADIRA Lambert at 12 PM, possible platelets 1:30 PM  11/11: Possible platelets 1:30 PM  11/14: Follow-up with YADIRA Sanchez at 12 PM, possible platelets 1:30 PM  11/18: Possible platelets 1:30 PM  11/21: Possible platelets 1:30 PM, follow-up with Dr. Gaytan at 3 PM.

## 2019-11-01 NOTE — PROGRESS NOTE ADULT - PROVIDER SPECIALTY LIST ADULT
BMT/SCT
Cardiology
Intervent Radiology
Intervent Radiology
Palliative Care
Palliative Care
BMT/SCT
Cardiology
BMT/SCT
Palliative Care

## 2019-11-04 ENCOUNTER — RESULT REVIEW (OUTPATIENT)
Age: 59
End: 2019-11-04

## 2019-11-04 ENCOUNTER — APPOINTMENT (OUTPATIENT)
Dept: INFUSION THERAPY | Facility: HOSPITAL | Age: 59
End: 2019-11-04

## 2019-11-04 LAB
CMV DNA CSF QL NAA+PROBE: SIGNIFICANT CHANGE UP
CMV DNA SPEC NAA+PROBE-LOG#: SIGNIFICANT CHANGE UP LOGIU/ML
EOSINOPHIL NFR BLD AUTO: 1 % — SIGNIFICANT CHANGE UP (ref 0–6)
HCT VFR BLD CALC: 28.3 % — LOW (ref 39–50)
HGB BLD-MCNC: 9.6 G/DL — LOW (ref 13–17)
LYMPHOCYTES # BLD AUTO: 21 % — SIGNIFICANT CHANGE UP (ref 13–44)
LYMPHOCYTES # BLD AUTO: LOW K/UL (ref 1–3.3)
MCHC RBC-ENTMCNC: 30.4 PG — SIGNIFICANT CHANGE UP (ref 27–34)
MCHC RBC-ENTMCNC: 33.8 G/DL — SIGNIFICANT CHANGE UP (ref 32–36)
MCV RBC AUTO: 89.8 FL — SIGNIFICANT CHANGE UP (ref 80–100)
MONOCYTES NFR BLD AUTO: 20 % — HIGH (ref 2–14)
NEUTROPHILS # BLD AUTO: 2.3 K/UL — SIGNIFICANT CHANGE UP (ref 1.8–7.4)
NEUTROPHILS NFR BLD AUTO: 58 % — SIGNIFICANT CHANGE UP (ref 43–77)
PLAT MORPH BLD: NORMAL — SIGNIFICANT CHANGE UP
PLATELET # BLD AUTO: 36 K/UL — LOW (ref 150–400)
RBC # BLD: 3.15 M/UL — LOW (ref 4.2–5.8)
RBC # FLD: 16.6 % — HIGH (ref 10.3–14.5)
RBC BLD AUTO: SIGNIFICANT CHANGE UP
WBC # BLD: 4 K/UL — SIGNIFICANT CHANGE UP (ref 3.8–10.5)
WBC # FLD AUTO: 4 K/UL — SIGNIFICANT CHANGE UP (ref 3.8–10.5)

## 2019-11-06 LAB
CD3 AND CD33 ENRICHMENT: SIGNIFICANT CHANGE UP
ENGRAFTMENT - POST: SIGNIFICANT CHANGE UP

## 2019-11-07 ENCOUNTER — APPOINTMENT (OUTPATIENT)
Dept: INFUSION THERAPY | Facility: HOSPITAL | Age: 59
End: 2019-11-07

## 2019-11-07 ENCOUNTER — APPOINTMENT (OUTPATIENT)
Dept: HEMATOLOGY ONCOLOGY | Facility: CLINIC | Age: 59
End: 2019-11-07
Payer: COMMERCIAL

## 2019-11-07 ENCOUNTER — LABORATORY RESULT (OUTPATIENT)
Age: 59
End: 2019-11-07

## 2019-11-07 ENCOUNTER — RESULT REVIEW (OUTPATIENT)
Age: 59
End: 2019-11-07

## 2019-11-07 VITALS
TEMPERATURE: 97.7 F | WEIGHT: 178.13 LBS | SYSTOLIC BLOOD PRESSURE: 124 MMHG | RESPIRATION RATE: 18 BRPM | BODY MASS INDEX: 24.16 KG/M2 | DIASTOLIC BLOOD PRESSURE: 67 MMHG | HEART RATE: 85 BPM | OXYGEN SATURATION: 98 %

## 2019-11-07 LAB
ALBUMIN SERPL ELPH-MCNC: 3.9 G/DL
ALP BLD-CCNC: 74 U/L
ALT SERPL-CCNC: 8 U/L
ANION GAP SERPL CALC-SCNC: 13 MMOL/L
AST SERPL-CCNC: 12 U/L
BASOPHILS # BLD AUTO: 0 K/UL — SIGNIFICANT CHANGE UP (ref 0–0.2)
BASOPHILS NFR BLD AUTO: 1 % — SIGNIFICANT CHANGE UP (ref 0–2)
BILIRUB SERPL-MCNC: <0.2 MG/DL
BUN SERPL-MCNC: 18 MG/DL
CALCIUM SERPL-MCNC: 9.4 MG/DL
CHLORIDE SERPL-SCNC: 105 MMOL/L
CO2 SERPL-SCNC: 21 MMOL/L
CREAT SERPL-MCNC: 1.22 MG/DL
EOSINOPHIL # BLD AUTO: 0 K/UL — SIGNIFICANT CHANGE UP (ref 0–0.5)
GLUCOSE SERPL-MCNC: 121 MG/DL
HCT VFR BLD CALC: 29.6 % — LOW (ref 39–50)
HGB BLD-MCNC: 9.6 G/DL — LOW (ref 13–17)
LDH SERPL-CCNC: 168 U/L
LYMPHOCYTES # BLD AUTO: 0.6 K/UL — LOW (ref 1–3.3)
LYMPHOCYTES # BLD AUTO: 14 % — SIGNIFICANT CHANGE UP (ref 13–44)
MAGNESIUM SERPL-MCNC: 1.4 MG/DL
MCHC RBC-ENTMCNC: 30 PG — SIGNIFICANT CHANGE UP (ref 27–34)
MCHC RBC-ENTMCNC: 32.2 G/DL — SIGNIFICANT CHANGE UP (ref 32–36)
MCV RBC AUTO: 93.2 FL — SIGNIFICANT CHANGE UP (ref 80–100)
METAMYELOCYTES # FLD: 1 % — HIGH (ref 0–0)
MONOCYTES # BLD AUTO: 0.9 K/UL — SIGNIFICANT CHANGE UP (ref 0–0.9)
MONOCYTES NFR BLD AUTO: 32 % — HIGH (ref 2–14)
MYELOCYTES NFR BLD: 2 % — HIGH (ref 0–0)
NEUTROPHILS # BLD AUTO: 1.2 K/UL — LOW (ref 1.8–7.4)
NEUTROPHILS NFR BLD AUTO: 50 % — SIGNIFICANT CHANGE UP (ref 43–77)
NRBC # BLD: 1 /100 — HIGH (ref 0–0)
PLAT MORPH BLD: NORMAL — SIGNIFICANT CHANGE UP
PLATELET # BLD AUTO: 57 K/UL — LOW (ref 150–400)
POTASSIUM SERPL-SCNC: 5.3 MMOL/L
PROT SERPL-MCNC: 5.9 G/DL
RBC # BLD: 3.18 M/UL — LOW (ref 4.2–5.8)
RBC # FLD: 15.7 % — HIGH (ref 10.3–14.5)
RBC BLD AUTO: SIGNIFICANT CHANGE UP
SODIUM SERPL-SCNC: 139 MMOL/L
TACROLIMUS SERPL-MCNC: 9.1 NG/ML
WBC # BLD: 2.8 K/UL — LOW (ref 3.8–10.5)
WBC # FLD AUTO: 2.8 K/UL — LOW (ref 3.8–10.5)

## 2019-11-07 PROCEDURE — 99215 OFFICE O/P EST HI 40 MIN: CPT

## 2019-11-07 RX ORDER — RIVAROXABAN 20 MG/1
20 TABLET, FILM COATED ORAL
Qty: 30 | Refills: 2 | Status: DISCONTINUED | COMMUNITY
End: 2019-11-07

## 2019-11-07 RX ORDER — FILGRASTIM-SNDZ 300 UG/.5ML
300 INJECTION, SOLUTION INTRAVENOUS; SUBCUTANEOUS
Qty: 7.5 | Refills: 0 | Status: DISCONTINUED | COMMUNITY
Start: 2019-09-19 | End: 2019-11-07

## 2019-11-08 ENCOUNTER — OUTPATIENT (OUTPATIENT)
Dept: OUTPATIENT SERVICES | Facility: HOSPITAL | Age: 59
LOS: 1 days | End: 2019-11-08

## 2019-11-08 DIAGNOSIS — C92.00 ACUTE MYELOBLASTIC LEUKEMIA, NOT HAVING ACHIEVED REMISSION: ICD-10-CM

## 2019-11-08 LAB
CMV DNA SPEC QL NAA+PROBE: 247 IU/ML
CMVPCR LOG: 2.39 LOGIU/ML

## 2019-11-08 NOTE — REVIEW OF SYSTEMS
[Fever] : no fever [Chills] : no chills [Fatigue] : fatigue [Eye Pain] : no eye pain [Vision Problems] : no vision problems [Dysphagia] : no dysphagia [Nosebleeds] : no nosebleeds [Odynophagia] : no odynophagia [Mucosal Pain] : no mucosal pain [Chest Pain] : no chest pain [Lower Ext Edema] : no lower extremity edema [Shortness Of Breath] : no shortness of breath [Cough] : no cough [Abdominal Pain] : no abdominal pain [Vomiting] : no vomiting [Constipation] : no constipation [Diarrhea] : diarrhea [Dysuria] : no dysuria [Joint Pain] : no joint pain [Muscle Pain] : muscle pain [Skin Rash] : no skin rash [Dizziness] : no dizziness [Fainting] : no fainting [Insomnia] : insomnia [Anxiety] : anxiety [Negative] : Heme/Lymph [FreeTextEntry7] : watery diarrhea for the past 2 days has resolved since this morning  [FreeTextEntry8] : . [FreeTextEntry9] : occasional low back pain  [de-identified] : generalized dry skin

## 2019-11-08 NOTE — ASSESSMENT
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\par 10/30/19 chimerism = 100% donor\par Post transplant BMBx pending\par \par 2) Heme\par Counts stable, no indication for transfusion today\par    11/7:   WBC 2.8   ANC 1.2   Hgb 9.6   PLT 57\par Continue multivitamin and folic acid daily\par \par Hx of recurrent DVT/PE\par Factor V Leiden reportedly runs in the family \par Xarelto on hold while pancytopenic, to resume when PLT consistently >100K\par \par 3) ID\par Continue ppx:\par - Acyclovir 400mg tid\par - Mepron 750mg bid\par - Voriconazole 200mg bid - hx of aspergillosis \par Post transplant crowd and dietary restrictions reviewed\par \par 10/31/19 CMV PCR not detected, continue to monitor at every visit\par \par 4) GVHD\par Skin 0   Liver 0   GI 0 - overall grade 0\par No signs/symptoms of acute or chronic GVHD at this time\par Currently taking FK 1mg bid - pending today's level\par Currently taking MMF 1g tid - instructed to decrease MMF to 1g bid on day +30 (11/8)\par Reviewed signs/symptoms of GVHD (i.e. rash, mucositis, nausea/vomiting, diarrhea)\par \par 5) GI\par Continue ppx:\par - Protonix 40mg daily\par - Ursodiol 300mg bid\par PRN Zofran and Reglan for nausea/vomiting\par \par 6) Other\par HTN - continue enalapril 2.5mg daily and metoprolol succinate 12.5mg daily \par BPH - continue finasteride 5mg daily and tamsulosin 0.4mg daily \par Hypomagnesemia - likely 2/2 FK, continue MgOx 400mg tid\par Anxiety/Depression - continue Zoloft 50mg daily and PRN Xanax 0.5mg qhs\par \par 7) Plan/Dispo\par Over 30min spent reviewing anticipated post-transplant recovery including weekly visits and labs, post transplant meds, risk for infection, signs/symptoms of infection, post transplant crowd and dietary restrictions, signs/symptoms of GVHD, when to call MD\par Pt educated regarding plan of care, all questions/concerns addressed\par F/u in 1wk with NP on 11/14/19

## 2019-11-08 NOTE — PHYSICAL EXAM
[Ambulatory and capable of all self care but unable to carry out any work activities] : Status 2- Ambulatory and capable of all self care but unable to carry out any work activities. Up and about more than 50% of waking hours [Ulcers] : no ulcers [Mucositis] : no mucositis [Thrush] : no thrush [Vesicles] : no vesicles [Normal] : affect appropriate [de-identified] : tachycardic, regular rhythm, normal s1s2 [de-identified] : no LE edema [de-identified] : +BS; abdomen soft, non-distended, non-tender [de-identified] : recent right first toe amputation [de-identified] : generalized dry skin

## 2019-11-08 NOTE — HISTORY OF PRESENT ILLNESS
[de-identified] : Mr. Delong is a 60 y/o male, with a previously diagnosed acute myeloid leukemia. A bone marrow biopsy from 7/3 revealed Acute myeloid leukemia (AML). FISH - report shows a population of aberrant myeloid blasts. He is currently being treated by Dr. Coelho for high risk AML with HIDAC consolidation chemotherapy. He was referred by Dr. Coelho for an initial consultation of a Haplo- transplant. \par \par The patient has a history of multiple blood clots - factor V Leiden runs in the family. He was diagnosed with sleep apnea, but refuses to use CPAP. He is a former smoker, 40 years. He also has a past medical history of PE, HTN, cardiomyopathy. He recently underwent an amputation of the right first toe due to an infection.\par \par S/p hospitalization at [facility] BMTU 10/3/19 - 11/1/19 for haplo (son) SCT. \par Upon admission, a TLC was placed in IR. Mr. Delong received IV fluid hydration, \par pain management, nutritional support, anxiolysis, antiemetics, and antiviral, \par antifungal, antibacterial, GI, PCP and VOD prophylaxis. When his ANC dropped \par below 1000, he was started on prophylactic Cefepime. Labs were monitored on a \par daily basis and he received transfusional support and electrolyte repletion as \par needed. \par \par While in patient, Mr. Delong was continued on his Voriconazole for history of \par aspergillosis. \par \par During admission, Mr. Delong had pancytopenia related to the high dose \par chemotherapy prep regimen. He also experienced neutropenic fevers. When he \par became febrile, blood and urine cultures were sent and a CXR was completed \par which were unremarkable. \par \par On 10/9/2019, following premedications, pt received 250 ml of allogeneic, \par related, haplo, fresh, mobilized HPC apheresis over 30-60 minutes. \par Cell counts as follows: \par \par Total MNCs (x10^8/kg) = 5.17 \par CD 34 cells (x10^6/kg) = 7.34 \par CD 3 Cells (x 10^7/kg) = 19.81 \par Cell viability  (%) = 100 \par \par Pt tolerated infusion without any adverse reaction or complications. \par \par Engraftment was noted on 10/28/2019. Daily Zarxio was discontinued, as was Cefepime given negative cultures. Mr. Delong was discharged home to f/u at the [facility]. [de-identified] : On 11/7/19 visit, day +29 of SCT today. Overall feeling relatively well. Ongoing fatigue. Appetite is slowly starting to improve. Occasional rhinitis. Has been having small volume watery diarrhea the past 2 days but resolved as of this morning with no further episodes. Occasional low back pain. Compliant with post transplant meds and restrictions. Currently taking FK 1mg bid which he did not take prior to lab draw today. Denies fever, chills, headache, dizziness, blurred vision, mucositis/odynophagia, chest pain, SOB, cough, nausea/vomiting, constipation, abdominal pain, dysuria, LE edema, rash, bleeding

## 2019-11-08 NOTE — REASON FOR VISIT
[Follow-Up Visit] : a follow-up visit for [Acute Myeloid Leukemia] : acute myeloid leukemia [Other: _____] : [unfilled] [FreeTextEntry2] : S/p haplo (son) SCT on 10/9/19

## 2019-11-11 ENCOUNTER — APPOINTMENT (OUTPATIENT)
Dept: INFUSION THERAPY | Facility: HOSPITAL | Age: 59
End: 2019-11-11

## 2019-11-14 ENCOUNTER — APPOINTMENT (OUTPATIENT)
Dept: HEMATOLOGY ONCOLOGY | Facility: CLINIC | Age: 59
End: 2019-11-14
Payer: COMMERCIAL

## 2019-11-14 ENCOUNTER — RESULT REVIEW (OUTPATIENT)
Age: 59
End: 2019-11-14

## 2019-11-14 ENCOUNTER — APPOINTMENT (OUTPATIENT)
Dept: INFUSION THERAPY | Facility: HOSPITAL | Age: 59
End: 2019-11-14

## 2019-11-14 ENCOUNTER — LABORATORY RESULT (OUTPATIENT)
Age: 59
End: 2019-11-14

## 2019-11-14 VITALS
OXYGEN SATURATION: 98 % | TEMPERATURE: 97.7 F | DIASTOLIC BLOOD PRESSURE: 69 MMHG | WEIGHT: 173.5 LBS | BODY MASS INDEX: 23.53 KG/M2 | RESPIRATION RATE: 18 BRPM | SYSTOLIC BLOOD PRESSURE: 106 MMHG | HEART RATE: 115 BPM

## 2019-11-14 LAB
ALBUMIN SERPL ELPH-MCNC: 4.1 G/DL
ALP BLD-CCNC: 87 U/L
ALT SERPL-CCNC: 8 U/L
ANION GAP SERPL CALC-SCNC: 15 MMOL/L
AST SERPL-CCNC: 15 U/L
BASOPHILS # BLD AUTO: 0 K/UL — SIGNIFICANT CHANGE UP (ref 0–0.2)
BASOPHILS NFR BLD AUTO: 1 % — SIGNIFICANT CHANGE UP (ref 0–2)
BILIRUB SERPL-MCNC: 0.2 MG/DL
BUN SERPL-MCNC: 22 MG/DL
CALCIUM SERPL-MCNC: 9.8 MG/DL
CHLORIDE SERPL-SCNC: 101 MMOL/L
CO2 SERPL-SCNC: 20 MMOL/L
CREAT SERPL-MCNC: 1.43 MG/DL
EOSINOPHIL # BLD AUTO: 0.1 K/UL — SIGNIFICANT CHANGE UP (ref 0–0.5)
EOSINOPHIL NFR BLD AUTO: 3 % — SIGNIFICANT CHANGE UP (ref 0–6)
GLUCOSE SERPL-MCNC: 177 MG/DL
HCT VFR BLD CALC: 27.7 % — LOW (ref 39–50)
HGB BLD-MCNC: 9.6 G/DL — LOW (ref 13–17)
LDH SERPL-CCNC: 190 U/L
LYMPHOCYTES # BLD AUTO: 0.7 K/UL — LOW (ref 1–3.3)
LYMPHOCYTES # BLD AUTO: 25 % — SIGNIFICANT CHANGE UP (ref 13–44)
MAGNESIUM SERPL-MCNC: 1.3 MG/DL
MCHC RBC-ENTMCNC: 31.5 PG — SIGNIFICANT CHANGE UP (ref 27–34)
MCHC RBC-ENTMCNC: 34.8 G/DL — SIGNIFICANT CHANGE UP (ref 32–36)
MCV RBC AUTO: 90.6 FL — SIGNIFICANT CHANGE UP (ref 80–100)
MONOCYTES # BLD AUTO: 1.1 K/UL — HIGH (ref 0–0.9)
MONOCYTES NFR BLD AUTO: 35 % — HIGH (ref 2–14)
NEUTROPHILS # BLD AUTO: 1 K/UL — LOW (ref 1.8–7.4)
NEUTROPHILS NFR BLD AUTO: 36 % — LOW (ref 43–77)
PLAT MORPH BLD: NORMAL — SIGNIFICANT CHANGE UP
PLATELET # BLD AUTO: 72 K/UL — LOW (ref 150–400)
POTASSIUM SERPL-SCNC: 4.6 MMOL/L
PROT SERPL-MCNC: 6.2 G/DL
RBC # BLD: 3.05 M/UL — LOW (ref 4.2–5.8)
RBC # FLD: 16.4 % — HIGH (ref 10.3–14.5)
RBC BLD AUTO: SIGNIFICANT CHANGE UP
SODIUM SERPL-SCNC: 136 MMOL/L
TACROLIMUS SERPL-MCNC: 9.4 NG/ML
WBC # BLD: 2.9 K/UL — LOW (ref 3.8–10.5)
WBC # FLD AUTO: 2.9 K/UL — LOW (ref 3.8–10.5)

## 2019-11-14 PROCEDURE — 99215 OFFICE O/P EST HI 40 MIN: CPT

## 2019-11-18 ENCOUNTER — APPOINTMENT (OUTPATIENT)
Dept: INFUSION THERAPY | Facility: HOSPITAL | Age: 59
End: 2019-11-18

## 2019-11-18 LAB
CMV DNA SPEC QL NAA+PROBE: 2205 IU/ML
CMVPCR LOG: 3.34 LOGIU/ML
ENGRAFTMNET-POST: NORMAL

## 2019-11-19 NOTE — REVIEW OF SYSTEMS
[Fatigue] : fatigue [Diarrhea] : diarrhea [Insomnia] : insomnia [Anxiety] : anxiety [Negative] : Musculoskeletal [Eye Pain] : no eye pain [Chills] : no chills [Fever] : no fever [Dysphagia] : no dysphagia [Vision Problems] : no vision problems [Nosebleeds] : no nosebleeds [Chest Pain] : no chest pain [Mucosal Pain] : no mucosal pain [Odynophagia] : no odynophagia [Lower Ext Edema] : no lower extremity edema [Cough] : no cough [Shortness Of Breath] : no shortness of breath [Vomiting] : no vomiting [Abdominal Pain] : no abdominal pain [Joint Pain] : no joint pain [Dysuria] : no dysuria [Constipation] : no constipation [Dizziness] : no dizziness [Skin Rash] : no skin rash [Fainting] : no fainting [FreeTextEntry7] : watery diarrhea has resolved [FreeTextEntry8] : . [de-identified] : generalized dry skin

## 2019-11-19 NOTE — ASSESSMENT
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\par 10/30/19 chimerism = 100% donor\par Post transplant BMBx pending\par \par 2) Heme\par Counts stable, no indication for transfusion today\par    11/14:   WBC 2.9   ANC 1.0   Hgb 9.6   PLT 72\par Continue multivitamin and folic acid daily\par \par Hx of recurrent DVT/PE\par Factor V Leiden reportedly runs in the family \par Xarelto on hold while pancytopenic, to resume when PLT consistently >100K\par \par 3) ID\par Continue ppx:\par - Acyclovir 400 mg tid\par - Mepron 750 mg bid\par - Voriconazole 200 mg bid - hx of aspergillosis \par Post transplant crowd and dietary restrictions reviewed\par \par 10/31/19 CMV PCR not detected\par 11/7/19 CMV , continue to monitor at every visit\par \par 4) GVHD\par Skin 0   Liver 0   GI 0 - overall grade 0\par No signs/symptoms of acute or chronic GVHD at this time\par Currently taking FK 1 mg bid - pending today's level\par Currently taking MMF 1 gram BID - instructed to decrease MMF to 1 gram daily on 11/15/19.\par Reviewed signs/symptoms of GVHD (i.e. rash, mucositis, nausea/vomiting, diarrhea)\par \par 5) GI\par Continue ppx:\par - Protonix 40 mg daily\par - Ursodiol 300 mg bid\par PRN Zofran and Reglan for nausea/vomiting\par \par 6) Other\par HTN - continue enalapril 2.5mg daily and metoprolol succinate 12.5mg daily \par BPH - continue finasteride 5mg daily and tamsulosin 0.4mg daily \par Hypomagnesemia - likely 2/2 FK, continue MgOx 400mg tid\par Anxiety/Depression - continue Zoloft 50mg daily and PRN Xanax 0.5mg qhs\par \par 7) Plan/Dispo\par Over 30min spent reviewing anticipated post-transplant recovery including weekly visits and labs, post transplant meds, risk for infection, signs/symptoms of infection, post transplant crowd and dietary restrictions, signs/symptoms of GVHD, when to call MD\delia Pt educated regarding plan of care, all questions/concerns addressed\par F/u in 1 week with MD Gaytan with possible platelet appointment same day

## 2019-11-19 NOTE — REASON FOR VISIT
[Follow-Up Visit] : a follow-up visit for [Acute Myeloid Leukemia] : acute myeloid leukemia [FreeTextEntry2] : S/p haplo (son) SCT on 10/9/19

## 2019-11-19 NOTE — PHYSICAL EXAM
[Ambulatory and capable of all self care but unable to carry out any work activities] : Status 2- Ambulatory and capable of all self care but unable to carry out any work activities. Up and about more than 50% of waking hours [Normal] : affect appropriate [Mucositis] : no mucositis [Ulcers] : no ulcers [Thrush] : no thrush [Vesicles] : no vesicles [de-identified] : no LE edema [de-identified] : tachycardic, regular rhythm, normal s1s2 [de-identified] : +BS; abdomen soft, non-distended, non-tender [de-identified] : generalized dry skin  [de-identified] : recent right first toe amputation

## 2019-11-19 NOTE — HISTORY OF PRESENT ILLNESS
[de-identified] : Mr. Delong is a 58 y/o male, with a previously diagnosed acute myeloid leukemia. A bone marrow biopsy from 7/3 revealed Acute myeloid leukemia (AML). FISH - report shows a population of aberrant myeloid blasts. He is currently being treated by Dr. Coelho for high risk AML with HIDAC consolidation chemotherapy. He was referred by Dr. Coelho for an initial consultation of a Haplo- transplant. \par \par The patient has a history of multiple blood clots - factor V Leiden runs in the family. He was diagnosed with sleep apnea, but refuses to use CPAP. He is a former smoker, 40 years. He also has a past medical history of PE, HTN, cardiomyopathy. He recently underwent an amputation of the right first toe due to an infection.\par \par S/p hospitalization at [facility] BMTU 10/3/19 - 11/1/19 for haplo (son) SCT. \par Upon admission, a TLC was placed in IR. Mr. Delong received IV fluid hydration, \par pain management, nutritional support, anxiolysis, antiemetics, and antiviral, \par antifungal, antibacterial, GI, PCP and VOD prophylaxis. When his ANC dropped \par below 1000, he was started on prophylactic Cefepime. Labs were monitored on a \par daily basis and he received transfusional support and electrolyte repletion as \par needed. \par \par While in patient, Mr. Delong was continued on his Voriconazole for history of \par aspergillosis. \par \par During admission, Mr. Delong had pancytopenia related to the high dose \par chemotherapy prep regimen. He also experienced neutropenic fevers. When he \par became febrile, blood and urine cultures were sent and a CXR was completed \par which were unremarkable. \par \par On 10/9/2019, following premedications, pt received 250 ml of allogeneic, \par related, haplo, fresh, mobilized HPC apheresis over 30-60 minutes. \par Cell counts as follows: \par \par Total MNCs (x10^8/kg) = 5.17 \par CD 34 cells (x10^6/kg) = 7.34 \par CD 3 Cells (x 10^7/kg) = 19.81 \par Cell viability  (%) = 100 \par \par Pt tolerated infusion without any adverse reaction or complications. \par \par Engraftment was noted on 10/28/2019. Daily Zarxio was discontinued, as was Cefepime given negative cultures. Mr. Delong was discharged home to f/u at the [facility]. [de-identified] : On 11/7/19 visit, day +29 of SCT today. Overall feeling relatively well. Ongoing fatigue. Appetite is slowly starting to improve. Occasional rhinitis. Has been having small volume watery diarrhea the past 2 days but resolved as of this morning with no further episodes. Occasional low back pain. Compliant with post transplant meds and restrictions. Currently taking FK 1mg bid which he did not take prior to lab draw today. Denies fever, chills, headache, dizziness, blurred vision, mucositis/odynophagia, chest pain, SOB, cough, nausea/vomiting, constipation, abdominal pain, dysuria, LE edema, rash, bleeding \par \par On 11/14/19, patient presents for a follow up visit. Today is + 36 days post transplant. Overall doing well and offers no acute concerns. On FK 1 mg BID and cellcept 1000 mg BID. States had diarrhea but has resolved at this time. Denies fever, chills, nausea, vomiting, rash, mouth sores, dysuria or any active signs of bleeding.  Denies SOB, chest pain or B/L LE edema. Remains compliant with post transplant diet and crowd restrictions. Remains compliant with post transplant medications. Denies any pain at this time.

## 2019-11-20 ENCOUNTER — FORM ENCOUNTER (OUTPATIENT)
Age: 59
End: 2019-11-20

## 2019-11-20 ENCOUNTER — APPOINTMENT (OUTPATIENT)
Dept: INFUSION THERAPY | Facility: HOSPITAL | Age: 59
End: 2019-11-20

## 2019-11-20 ENCOUNTER — RESULT REVIEW (OUTPATIENT)
Age: 59
End: 2019-11-20

## 2019-11-20 ENCOUNTER — LABORATORY RESULT (OUTPATIENT)
Age: 59
End: 2019-11-20

## 2019-11-20 ENCOUNTER — APPOINTMENT (OUTPATIENT)
Dept: HEMATOLOGY ONCOLOGY | Facility: CLINIC | Age: 59
End: 2019-11-20
Payer: COMMERCIAL

## 2019-11-20 VITALS
OXYGEN SATURATION: 99 % | SYSTOLIC BLOOD PRESSURE: 79 MMHG | DIASTOLIC BLOOD PRESSURE: 37 MMHG | BODY MASS INDEX: 23.56 KG/M2 | RESPIRATION RATE: 17 BRPM | TEMPERATURE: 98.1 F | HEART RATE: 109 BPM | WEIGHT: 173.7 LBS

## 2019-11-20 DIAGNOSIS — R60.9 EDEMA, UNSPECIFIED: ICD-10-CM

## 2019-11-20 LAB
ALBUMIN SERPL ELPH-MCNC: 3.9 G/DL
ALP BLD-CCNC: 92 U/L
ALT SERPL-CCNC: 11 U/L
ANION GAP SERPL CALC-SCNC: 13 MMOL/L
AST SERPL-CCNC: 17 U/L
BILIRUB SERPL-MCNC: 0.2 MG/DL
BUN SERPL-MCNC: 16 MG/DL
CALCIUM SERPL-MCNC: 9.3 MG/DL
CHLORIDE SERPL-SCNC: 99 MMOL/L
CO2 SERPL-SCNC: 21 MMOL/L
CREAT SERPL-MCNC: 1.69 MG/DL
EOSINOPHIL # BLD AUTO: 0.2 K/UL — SIGNIFICANT CHANGE UP (ref 0–0.5)
EOSINOPHIL NFR BLD AUTO: 3 % — SIGNIFICANT CHANGE UP (ref 0–6)
GLUCOSE SERPL-MCNC: 162 MG/DL
HCT VFR BLD CALC: 29.1 % — LOW (ref 39–50)
HGB BLD-MCNC: 9.5 G/DL — LOW (ref 13–17)
LDH SERPL-CCNC: 229 U/L
LYMPHOCYTES # BLD AUTO: 0.7 K/UL — LOW (ref 1–3.3)
LYMPHOCYTES # BLD AUTO: 32 % — SIGNIFICANT CHANGE UP (ref 13–44)
MAGNESIUM SERPL-MCNC: 1.4 MG/DL
MCHC RBC-ENTMCNC: 29.8 PG — SIGNIFICANT CHANGE UP (ref 27–34)
MCHC RBC-ENTMCNC: 32.6 G/DL — SIGNIFICANT CHANGE UP (ref 32–36)
MCV RBC AUTO: 91.2 FL — SIGNIFICANT CHANGE UP (ref 80–100)
MONOCYTES # BLD AUTO: 0.7 K/UL — SIGNIFICANT CHANGE UP (ref 0–0.9)
MONOCYTES NFR BLD AUTO: 15 % — HIGH (ref 2–14)
NEUTROPHILS # BLD AUTO: 1.7 K/UL — LOW (ref 1.8–7.4)
NEUTROPHILS NFR BLD AUTO: 49 % — SIGNIFICANT CHANGE UP (ref 43–77)
NEUTS BAND # BLD: 1 % — SIGNIFICANT CHANGE UP (ref 0–8)
PLAT MORPH BLD: NORMAL — SIGNIFICANT CHANGE UP
PLATELET # BLD AUTO: 71 K/UL — LOW (ref 150–400)
POTASSIUM SERPL-SCNC: 4.3 MMOL/L
PROT SERPL-MCNC: 6 G/DL
RBC # BLD: 3.19 M/UL — LOW (ref 4.2–5.8)
RBC # FLD: 16.7 % — HIGH (ref 10.3–14.5)
RBC BLD AUTO: SIGNIFICANT CHANGE UP
SODIUM SERPL-SCNC: 133 MMOL/L
WBC # BLD: 3.3 K/UL — LOW (ref 3.8–10.5)
WBC # FLD AUTO: 3.3 K/UL — LOW (ref 3.8–10.5)

## 2019-11-20 PROCEDURE — 99214 OFFICE O/P EST MOD 30 MIN: CPT

## 2019-11-21 ENCOUNTER — APPOINTMENT (OUTPATIENT)
Dept: HEMATOLOGY ONCOLOGY | Facility: CLINIC | Age: 59
End: 2019-11-21

## 2019-11-21 ENCOUNTER — APPOINTMENT (OUTPATIENT)
Dept: INFUSION THERAPY | Facility: HOSPITAL | Age: 59
End: 2019-11-21

## 2019-11-21 ENCOUNTER — APPOINTMENT (OUTPATIENT)
Dept: ULTRASOUND IMAGING | Facility: IMAGING CENTER | Age: 59
End: 2019-11-21
Payer: COMMERCIAL

## 2019-11-21 ENCOUNTER — OUTPATIENT (OUTPATIENT)
Dept: OUTPATIENT SERVICES | Facility: HOSPITAL | Age: 59
LOS: 1 days | End: 2019-11-21
Payer: COMMERCIAL

## 2019-11-21 DIAGNOSIS — E86.0 DEHYDRATION: ICD-10-CM

## 2019-11-21 DIAGNOSIS — C92.00 ACUTE MYELOBLASTIC LEUKEMIA, NOT HAVING ACHIEVED REMISSION: ICD-10-CM

## 2019-11-21 LAB
CMV DNA SPEC QL NAA+PROBE: 8581 IU/ML
CMVPCR LOG: 3.93 LOGIU/ML
TACROLIMUS SERPL-MCNC: 9.1 NG/ML

## 2019-11-21 PROCEDURE — 93971 EXTREMITY STUDY: CPT | Mod: 26,RT

## 2019-11-21 PROCEDURE — 93971 EXTREMITY STUDY: CPT

## 2019-11-21 RX ORDER — MYCOPHENOLATE MOFETIL 500 MG/1
500 TABLET ORAL
Qty: 180 | Refills: 3 | Status: DISCONTINUED | COMMUNITY
Start: 2019-11-07 | End: 2019-11-21

## 2019-11-22 PROBLEM — R60.9 EDEMA OF RIGHT FOREARM: Status: ACTIVE | Noted: 2019-11-22

## 2019-11-22 LAB — ENGRAFTMNET-POST: NORMAL

## 2019-11-22 RX ORDER — ENALAPRIL MALEATE 2.5 MG/1
2.5 TABLET ORAL DAILY
Qty: 30 | Refills: 5 | Status: DISCONTINUED | COMMUNITY
Start: 2019-09-06 | End: 2019-11-22

## 2019-11-22 RX ORDER — METOPROLOL SUCCINATE 25 MG/1
25 TABLET, EXTENDED RELEASE ORAL DAILY
Refills: 0 | Status: DISCONTINUED | COMMUNITY
Start: 2019-08-27 | End: 2019-11-22

## 2019-11-22 NOTE — HISTORY OF PRESENT ILLNESS
[de-identified] : Mr. Delong is a 60 y/o male, with a previously diagnosed acute myeloid leukemia. A bone marrow biopsy from 7/3 revealed Acute myeloid leukemia (AML). FISH - report shows a population of aberrant myeloid blasts. He is currently being treated by Dr. Coelho for high risk AML with HIDAC consolidation chemotherapy. He was referred by Dr. Coelho for an initial consultation of a Haplo- transplant. \par \par The patient has a history of multiple blood clots - factor V Leiden runs in the family. He was diagnosed with sleep apnea, but refuses to use CPAP. He is a former smoker, 40 years. He also has a past medical history of PE, HTN, cardiomyopathy. He recently underwent an amputation of the right first toe due to an infection.\par \par S/p hospitalization at [facility] BMTU 10/3/19 - 11/1/19 for haplo (son) SCT. \par Upon admission, a TLC was placed in IR. Mr. Delong received IV fluid hydration, \par pain management, nutritional support, anxiolysis, antiemetics, and antiviral, \par antifungal, antibacterial, GI, PCP and VOD prophylaxis. When his ANC dropped \par below 1000, he was started on prophylactic Cefepime. Labs were monitored on a \par daily basis and he received transfusional support and electrolyte repletion as \par needed. \par \par While in patient, Mr. Delong was continued on his Voriconazole for history of \par aspergillosis. \par \par During admission, Mr. Delong had pancytopenia related to the high dose \par chemotherapy prep regimen. He also experienced neutropenic fevers. When he \par became febrile, blood and urine cultures were sent and a CXR was completed \par which were unremarkable. \par \par On 10/9/2019, following premedications, pt received 250 ml of allogeneic, \par related, haplo, fresh, mobilized HPC apheresis over 30-60 minutes. \par Cell counts as follows: \par \par Total MNCs (x10^8/kg) = 5.17 \par CD 34 cells (x10^6/kg) = 7.34 \par CD 3 Cells (x 10^7/kg) = 19.81 \par Cell viability  (%) = 100 \par \par Pt tolerated infusion without any adverse reaction or complications. \par \par Engraftment was noted on 10/28/2019. Daily Zarxio was discontinued, as was Cefepime given negative cultures. Mr. Delong was discharged home to f/u at the [facility]. [de-identified] : On 11/20/19 visit, day +42 of SCT today. Add on visit for evaluation of hypotension, dizziness, and failure to thrive. BP 79/37 manually today with HR of 109. Dizziness only with changing positions and standing. Has been holding enalapril and meoprolol. Feeling very weak and fatigued, SOB on exertion that resolves with a couple minutes of rest. Poor PO intake/hydration, only drinking 3 bottles of water per day. Occasional stomachache after eating, Mar hardwick has been helpful. Compliant with post transplant meds and restrictions. Currently taking FK 1mg in AM and 0.5mg in PM which he did not take prior to lab draw today. Denies fever, chills, headache, blurred vision, mucositis/odynophagia, chest pain, cough, nausea/vomiting, diarrhea/constipation, dysuria, LE edema, rash, bleeding, pain

## 2019-11-22 NOTE — REVIEW OF SYSTEMS
[Fatigue] : fatigue [Insomnia] : insomnia [Anxiety] : anxiety [Negative] : Heme/Lymph [SOB on Exertion] : shortness of breath during exertion [Abdominal Pain] : abdominal pain [Muscle Pain] : muscle pain [Dizziness] : dizziness [Chills] : no chills [Fever] : no fever [Eye Pain] : no eye pain [Dry Eyes] : no dryness of the eyes [Vision Problems] : no vision problems [Dysphagia] : no dysphagia [Nosebleeds] : no nosebleeds [Odynophagia] : no odynophagia [Mucosal Pain] : no mucosal pain [Chest Pain] : no chest pain [Lower Ext Edema] : no lower extremity edema [Cough] : no cough [Vomiting] : no vomiting [Constipation] : no constipation [Diarrhea] : no diarrhea [Dysuria] : no dysuria [Joint Pain] : no joint pain [Skin Rash] : no skin rash [Fainting] : no fainting [FreeTextEntry7] : post-prandial stomachache, valentina hardwick has been helpful [de-identified] : generalized dry skin

## 2019-11-22 NOTE — REASON FOR VISIT
[Follow-Up Visit] : a follow-up visit for [Acute Myeloid Leukemia] : acute myeloid leukemia [Other: _____] : [unfilled] [FreeTextEntry2] : S/p haplo (son) PBSCT on 10/9/19

## 2019-11-22 NOTE — ASSESSMENT
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\par 10/30/19 chimerism = 100% donor\par 11/14/19 chimerism = 100% donor\par Post transplant BMBx pending\par \par 2) Heme\par Counts stable but remains pancytopenic, no indication for transfusion today\par    11/20/19:   WBC 3.3   ANC 1.7   Hgb 9.5   PLT 71\par Monitor counts closely while on Valcyte, started 11/20/19 \par Continue multivitamin and folic acid daily\par \par Hx of recurrent DVT/PE\par Factor V Leiden reportedly runs in the family \par 11/21/19 Doppler US of RUE negative for DVT\par Xarelto on hold while pancytopenic, to resume when PLT consistently >100K\par \par 3) ID\par Continue ppx:\par - Mepron 750 mg bid\par - Voriconazole 200 mg bid - hx of aspergillosis \par Post transplant crowd and dietary restrictions reviewed\par \par CMV viremia\par 11/7/19 CMV PCR = 247\par 11/14/19 CMV PCR = 2,205\par 11/20/19 CMV PCR = 8,581\par Valcyte 450mg bid started 11/21/19\par Continue to monitor PCR weekly \par \par 4) GVHD\par Skin 0   Liver 0   GI 0 - overall grade 0\par No signs/symptoms of acute or chronic GVHD at this time\par FK = 9.1 today - FK decreased to 0.5mg bid\par Off MMF as of 11/22/19 \par Reviewed signs/symptoms of GVHD (i.e. rash, mucositis, nausea/vomiting, diarrhea)\par \par 5) GI\par Continue ppx:\par - Protonix 40 mg daily\par - Ursodiol 300 mg bid\par PRN Zofran and Reglan for nausea/vomiting\par \par 6) Failure to thrive\par Poor PO intake/hydration with resulting hypotension\par Prednisone 20mg daily started 11/20/19\par Will arrange IVF twice a week until PO intake improves, 750mL/2hrs given cardiomyopathy \par Encouraged to hydrate adequately and eat small frequent meals/snacks\par \par 7) Other\par HTN - enalapril 2.5mg daily and metoprolol succinate 12.5mg daily on hold in setting of hypotension and FTT\par BPH - continue finasteride 5mg daily and tamsulosin 0.4mg daily \par Hypomagnesemia - likely 2/2 FK, continue MgOx 400mg tid\par Anxiety/Depression - continue Zoloft 50mg daily and PRN Xanax 0.5mg qhs\par \par 8) Plan/Dispo\par Pt and girlfriend educated regarding plan of care, all questions/concerns addressed\par IVF today and tmrw 11/21/19\par F/u in 1wk with NP on 11/26/19

## 2019-11-22 NOTE — PHYSICAL EXAM
[Normal] : affect appropriate [Capable of only limited self care, confined to bed or chair more than 50% of waking hours] : Status 3- Capable of only limited self care, confined to bed or chair more than 50% of waking hours [Ulcers] : no ulcers [Mucositis] : no mucositis [Thrush] : no thrush [Vesicles] : no vesicles [de-identified] : tachycardic, regular rhythm, normal s1s2 [de-identified] : no LE edema; mild right arm edema from elbow to wrist [de-identified] : +BS; abdomen soft, non-distended, non-tender [de-identified] : weak, normally ambulates with walker but in wheelchair today [de-identified] : generalized dry skin

## 2019-11-26 ENCOUNTER — APPOINTMENT (OUTPATIENT)
Dept: INFUSION THERAPY | Facility: HOSPITAL | Age: 59
End: 2019-11-26

## 2019-11-26 ENCOUNTER — LABORATORY RESULT (OUTPATIENT)
Age: 59
End: 2019-11-26

## 2019-11-26 ENCOUNTER — APPOINTMENT (OUTPATIENT)
Dept: HEMATOLOGY ONCOLOGY | Facility: CLINIC | Age: 59
End: 2019-11-26
Payer: COMMERCIAL

## 2019-11-26 ENCOUNTER — RESULT REVIEW (OUTPATIENT)
Age: 59
End: 2019-11-26

## 2019-11-26 VITALS
DIASTOLIC BLOOD PRESSURE: 73 MMHG | OXYGEN SATURATION: 98 % | HEART RATE: 90 BPM | TEMPERATURE: 98.5 F | WEIGHT: 189.36 LBS | BODY MASS INDEX: 25.68 KG/M2 | RESPIRATION RATE: 16 BRPM | SYSTOLIC BLOOD PRESSURE: 127 MMHG

## 2019-11-26 LAB
HCT VFR BLD CALC: 27.7 % — LOW (ref 39–50)
HGB BLD-MCNC: 8.9 G/DL — LOW (ref 13–17)
LYMPHOCYTES # BLD AUTO: 21 % — SIGNIFICANT CHANGE UP (ref 13–44)
LYMPHOCYTES # BLD AUTO: HIGH K/UL (ref 1–3.3)
MCHC RBC-ENTMCNC: 29.8 PG — SIGNIFICANT CHANGE UP (ref 27–34)
MCHC RBC-ENTMCNC: 32 G/DL — SIGNIFICANT CHANGE UP (ref 32–36)
MCV RBC AUTO: 93.1 FL — SIGNIFICANT CHANGE UP (ref 80–100)
METAMYELOCYTES # FLD: 1 % — HIGH (ref 0–0)
MONOCYTES NFR BLD AUTO: 17 % — HIGH (ref 2–14)
MYELOCYTES NFR BLD: 3 % — HIGH (ref 0–0)
NEUTROPHILS # BLD AUTO: 16.2 K/UL — HIGH (ref 1.8–7.4)
NEUTROPHILS NFR BLD AUTO: 58 % — SIGNIFICANT CHANGE UP (ref 43–77)
NRBC # BLD: 1 /100 — HIGH (ref 0–0)
PLAT MORPH BLD: NORMAL — SIGNIFICANT CHANGE UP
PLATELET # BLD AUTO: 139 K/UL — LOW (ref 150–400)
RBC # BLD: 2.97 M/UL — LOW (ref 4.2–5.8)
RBC # FLD: 18.1 % — HIGH (ref 10.3–14.5)
RBC BLD AUTO: SIGNIFICANT CHANGE UP
WBC # BLD: 21.5 K/UL — HIGH (ref 3.8–10.5)
WBC # FLD AUTO: 21.5 K/UL — HIGH (ref 3.8–10.5)

## 2019-11-26 PROCEDURE — 99214 OFFICE O/P EST MOD 30 MIN: CPT

## 2019-11-26 RX ORDER — CHLORHEXIDINE GLUCONATE 4 %
LIQUID (ML) TOPICAL DAILY
Qty: 30 | Refills: 5 | Status: ACTIVE | COMMUNITY
Start: 2019-11-07 | End: 1900-01-01

## 2019-11-29 ENCOUNTER — APPOINTMENT (OUTPATIENT)
Dept: INFUSION THERAPY | Facility: HOSPITAL | Age: 59
End: 2019-11-29

## 2019-11-29 ENCOUNTER — OUTPATIENT (OUTPATIENT)
Dept: OUTPATIENT SERVICES | Facility: HOSPITAL | Age: 59
LOS: 1 days | Discharge: HOME | End: 2019-11-29

## 2019-11-29 DIAGNOSIS — C92.00 ACUTE MYELOBLASTIC LEUKEMIA, NOT HAVING ACHIEVED REMISSION: ICD-10-CM

## 2019-12-01 LAB — ENGRAFTMNET-POST: NORMAL

## 2019-12-04 ENCOUNTER — APPOINTMENT (OUTPATIENT)
Dept: HEMATOLOGY ONCOLOGY | Facility: CLINIC | Age: 59
End: 2019-12-04
Payer: COMMERCIAL

## 2019-12-04 ENCOUNTER — LABORATORY RESULT (OUTPATIENT)
Age: 59
End: 2019-12-04

## 2019-12-04 ENCOUNTER — APPOINTMENT (OUTPATIENT)
Dept: INFUSION THERAPY | Facility: HOSPITAL | Age: 59
End: 2019-12-04

## 2019-12-04 ENCOUNTER — RESULT REVIEW (OUTPATIENT)
Age: 59
End: 2019-12-04

## 2019-12-04 LAB
BASOPHILS # BLD AUTO: 0.1 K/UL — SIGNIFICANT CHANGE UP (ref 0–0.2)
BASOPHILS NFR BLD AUTO: 0.5 % — SIGNIFICANT CHANGE UP (ref 0–2)
EOSINOPHIL # BLD AUTO: 0.1 K/UL — SIGNIFICANT CHANGE UP (ref 0–0.5)
EOSINOPHIL NFR BLD AUTO: 0.8 % — SIGNIFICANT CHANGE UP (ref 0–6)
HCT VFR BLD CALC: 28.6 % — LOW (ref 39–50)
HGB BLD-MCNC: 9.2 G/DL — LOW (ref 13–17)
LYMPHOCYTES # BLD AUTO: 26.9 % — SIGNIFICANT CHANGE UP (ref 13–44)
LYMPHOCYTES # BLD AUTO: 3 K/UL — SIGNIFICANT CHANGE UP (ref 1–3.3)
MCHC RBC-ENTMCNC: 31.1 PG — SIGNIFICANT CHANGE UP (ref 27–34)
MCHC RBC-ENTMCNC: 32.1 G/DL — SIGNIFICANT CHANGE UP (ref 32–36)
MCV RBC AUTO: 97 FL — SIGNIFICANT CHANGE UP (ref 80–100)
MONOCYTES # BLD AUTO: 0.6 K/UL — SIGNIFICANT CHANGE UP (ref 0–0.9)
MONOCYTES NFR BLD AUTO: 5.2 % — SIGNIFICANT CHANGE UP (ref 2–14)
NEUTROPHILS # BLD AUTO: 7.4 K/UL — SIGNIFICANT CHANGE UP (ref 1.8–7.4)
NEUTROPHILS NFR BLD AUTO: 66.6 % — SIGNIFICANT CHANGE UP (ref 43–77)
PLATELET # BLD AUTO: 83 K/UL — LOW (ref 150–400)
RBC # BLD: 2.95 M/UL — LOW (ref 4.2–5.8)
RBC # FLD: 20.4 % — HIGH (ref 10.3–14.5)
WBC # BLD: 11.2 K/UL — HIGH (ref 3.8–10.5)
WBC # FLD AUTO: 11.2 K/UL — HIGH (ref 3.8–10.5)

## 2019-12-04 PROCEDURE — 99215 OFFICE O/P EST HI 40 MIN: CPT

## 2019-12-04 NOTE — ADDENDUM
[FreeTextEntry1] : Documented by J Carlos Babb acting as a scribe for Dr. Rosina Gaytan on 12/04/2019 \par All medical record entries made by the Scribe were at my, Dr. Rosina Gaytan, direction and personally dictated by me on 12/04/2019. I have reviewed the chart and agree that the record accurately reflects my personal performance of the history, physical exam, assessment and plan. I have also personally directed, reviewed, and agree with the discharge instructions.\par \par \par

## 2019-12-04 NOTE — HISTORY OF PRESENT ILLNESS
[de-identified] : Mr. Delong is a 60 y/o male, with a previously diagnosed acute myeloid leukemia. A bone marrow biopsy from 7/3 revealed Acute myeloid leukemia (AML). FISH - report shows a population of aberrant myeloid blasts. He is currently being treated by Dr. Coelho for high risk AML with HIDAC consolidation chemotherapy. He was referred by Dr. Coelho for an initial consultation of a Haplo- transplant. \par \par The patient has a history of multiple blood clots - factor V Leiden runs in the family. He was diagnosed with sleep apnea, but refuses to use CPAP. He is a former smoker, 40 years. He also has a past medical history of PE, HTN, cardiomyopathy. He recently underwent an amputation of the right first toe due to an infection.\par \par S/p hospitalization at [facility] BMTU 10/3/19 - 11/1/19 for haplo (son) SCT. \par Upon admission, a TLC was placed in IR. Mr. Delong received IV fluid hydration, \par pain management, nutritional support, anxiolysis, antiemetics, and antiviral, \par antifungal, antibacterial, GI, PCP and VOD prophylaxis. When his ANC dropped \par below 1000, he was started on prophylactic Cefepime. Labs were monitored on a \par daily basis and he received transfusional support and electrolyte repletion as \par needed. \par \par While in patient, Mr. Delong was continued on his Voriconazole for history of \par aspergillosis. \par \par During admission, Mr. Delong had pancytopenia related to the high dose \par chemotherapy prep regimen. He also experienced neutropenic fevers. When he \par became febrile, blood and urine cultures were sent and a CXR was completed \par which were unremarkable. \par \par On 10/9/2019, following premedications, pt received 250 ml of allogeneic, \par related, haplo, fresh, mobilized HPC apheresis over 30-60 minutes. \par Cell counts as follows: \par \par Total MNCs (x10^8/kg) = 5.17 \par CD 34 cells (x10^6/kg) = 7.34 \par CD 3 Cells (x 10^7/kg) = 19.81 \par Cell viability  (%) = 100 \par \par Pt tolerated infusion without any adverse reaction or complications. \par \par Engraftment was noted on 10/28/2019. Daily Zarxio was discontinued, as was Cefepime given negative cultures. Mr. Delong was discharged home [facility]. [de-identified] : On 11/20/19 visit, day +42 of SCT today. Add on visit for evaluation of hypotension, dizziness, and failure to thrive. BP 79/37 manually today with HR of 109. Dizziness only with changing positions and standing. Has been holding enalapril and meoprolol. Feeling very weak and fatigued, SOB on exertion that resolves with a couple minutes of rest. Poor PO intake/hydration, only drinking 3 bottles of water per day. Occasional stomachache after eating, Mar hardwick has been helpful. Compliant with post transplant meds and restrictions. Currently taking FK 1mg in AM and 0.5mg in PM which he did not take prior to lab draw today. Denies fever, chills, headache, blurred vision, mucositis/odynophagia, chest pain, cough, nausea/vomiting, diarrhea/constipation, dysuria, LE edema, rash, bleeding, pain \par \par On 11/26/19 visit, day + 48 of SCT today. Patient has been receiving IV fluids in treatment room twice a week and holding enalapril and metoprolol. Overall feels well and offers no acute concerns. Currently on FK 0.5 mg BID and prednisone 20 mg daily. Recent weight gain. Denies fever, chills, nausea, vomiting, diarrhea, rash, mouth sores, dysuria or any signs of active bleeding. Denies SOB,chest pain or B/L LE edema. Remains compliant with post transplant diet and crowd restrictions. Remains compliant with post transplant medications.

## 2019-12-04 NOTE — ASSESSMENT
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 with the following comorbidities being managed:\par \par 1) AML\par S/p haplo (son) PSCT on 10/9/19\par 10/30/19 chimerism = 100% donor\par 11/14/19 chimerism = 100% donor\par Post transplant BMBx pending\par \par 2) Heme\par Counts stable but remains pancytopenic, no indication for transfusion today\par 12/4/19: WBC 11.2  ANC 7.4  Hgb 9.2 PLT 83\par Monitor counts closely while on Valcyte, started 11/20/19 \par Continue multivitamin and folic acid daily\par \par Hx of recurrent DVT/PE\par Factor V Leiden reportedly runs in the family \par 11/21/19 Doppler US of RUE negative for DVT\par Xarelto on hold while pancytopenic, to resume when PLT consistently >100K\par \par 3) ID\par Continue ppx:\par - Mepron 750 mg bid\par - Voriconazole 200 mg bid - hx of aspergillosis \par Post transplant crowd and dietary restrictions reviewed\par \par CMV viremia\par 11/7/19 CMV PCR = 247\par 11/14/19 CMV PCR = 2,205\par 11/20/19 CMV PCR = 8,581\par 11/26/19 CMV PCR = 4103\par Valcyte 450mg BID started 11/21/19\par Continue to monitor PCR weekly \par \par 4) GVHD\par Skin 0 Liver 0 GI 0 - overall grade 0\par No signs/symptoms of acute or chronic GVHD at this time\par Continue FK 0.5 mg bid\par Off MMF as of 11/22/19 \par Reviewed signs/symptoms of GVHD (i.e. rash, mucositis, nausea/vomiting, diarrhea)\par \par 5) GI\par Continue ppx:\par - Protonix 40 mg daily\par - Ursodiol 300 mg bid\par PRN Zofran and Reglan for nausea/vomiting\par \par 6) Failure to thrive\par Poor PO intake/hydration with resulting hypotension\par Prednisone 20 mg daily started 11/20/19....Prednisone lowered to 15mg daily.....Plan to further decrease Prednisone to 10mg daily next week. \par Decrease IVF once a week until PO intake improves, 750mL/2hrs given cardiomyopathy \par Encouraged to hydrate adequately and eat small frequent meals/snacks\par \par 7) Other\par HTN - enalapril 2.5mg daily and metoprolol succinate 12.5mg daily on hold in setting of hypotension and FTT\par BPH - continue finasteride 5mg daily and tamsulosin 0.4mg daily \par Hypomagnesemia - likely 2/2 FK, continue MgOx 400mg tid\par Anxiety/Depression - continue Zoloft 50mg daily and PRN Xanax 0.5mg qhs\par \par 8) Plan/Dispo\par Pt educated regarding plan of care, all questions/concerns addressed\par IVF today 12/4/19. \par F/u in 1 week with YADIRA Lambert

## 2019-12-04 NOTE — ASSESSMENT
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 with the following comorbidities being managed:\par \par 1) AML\par S/p haplo (son) PSCT on 10/9/19\par 10/30/19 chimerism = 100% donor\par 11/14/19 chimerism = 100% donor\par Post transplant BMBx pending\par \par 2) Heme\par Counts stable but remains pancytopenic, no indication for transfusion today\par    11/26/19:  WBC 21.5  ANC 16.2  Hgb 8.9  \par Monitor counts closely while on Valcyte, started 11/20/19 \par Continue multivitamin and folic acid daily\par \par Hx of recurrent DVT/PE\par Factor V Leiden reportedly runs in the family \par 11/21/19 Doppler US of RUE negative for DVT\par Xarelto on hold while pancytopenic, to resume when PLT consistently >100K\par \par 3) ID\par Continue ppx:\par - Mepron 750 mg bid\par - Voriconazole 200 mg bid - hx of aspergillosis \par Post transplant crowd and dietary restrictions reviewed\par \par CMV viremia\par 11/7/19 CMV PCR = 247\par 11/14/19 CMV PCR = 2,205\par 11/20/19 CMV PCR = 8,581\par 11/26/19 CMV PCR = 4103\par Valcyte 450mg bid started 11/21/19\par Continue to monitor PCR weekly \par \par 4) GVHD\par Skin 0   Liver 0   GI 0 - overall grade 0\par No signs/symptoms of acute or chronic GVHD at this time\par Continue FK  0.5 mg bid\par Off MMF as of 11/22/19 \par Reviewed signs/symptoms of GVHD (i.e. rash, mucositis, nausea/vomiting, diarrhea)\par \par 5) GI\par Continue ppx:\par - Protonix 40 mg daily\par - Ursodiol 300 mg bid\par PRN Zofran and Reglan for nausea/vomiting\par \par 6) Failure to thrive\par Poor PO intake/hydration with resulting hypotension\par Prednisone 20 mg daily started 11/20/19\par Decrease IVF once a week until PO intake improves, 750mL/2hrs given cardiomyopathy \par Encouraged to hydrate adequately and eat small frequent meals/snacks\par \par 7) Other\par HTN - enalapril 2.5mg daily and metoprolol succinate 12.5mg daily on hold in setting of hypotension and FTT\par BPH - continue finasteride 5mg daily and tamsulosin 0.4mg daily \par Hypomagnesemia - likely 2/2 FK, continue MgOx 400mg tid\par Anxiety/Depression - continue Zoloft 50mg daily and PRN Xanax 0.5mg qhs\par \par 8) Plan/Dispo\par Pt educated regarding plan of care, all questions/concerns addressed\par IVF today 11/26/19\par F/u in 1 week with Dr Gaytan

## 2019-12-04 NOTE — PHYSICAL EXAM
[Capable of only limited self care, confined to bed or chair more than 50% of waking hours] : Status 3- Capable of only limited self care, confined to bed or chair more than 50% of waking hours [Normal] : full range of motion and no deformities appreciated [Ulcers] : no ulcers [Mucositis] : no mucositis [Vesicles] : no vesicles [Thrush] : no thrush [de-identified] : +BS; abdomen soft, non-distended, non-tender [de-identified] :  regular rhythm, normal s1s2 [de-identified] : no LE edema [de-identified] : generalized dry skin

## 2019-12-04 NOTE — HISTORY OF PRESENT ILLNESS
[de-identified] : Mr. Delong is a 60 y/o male, with a previously diagnosed acute myeloid leukemia. A bone marrow biopsy from 7/3 revealed Acute myeloid leukemia (AML). FISH - report shows a population of aberrant myeloid blasts. He is currently being treated by Dr. Coelho for high risk AML with HIDAC consolidation chemotherapy. He was referred by Dr. Coelho for an initial consultation of a Haplo- transplant. \par \par The patient has a history of multiple blood clots - factor V Leiden runs in the family. He was diagnosed with sleep apnea, but refuses to use CPAP. He is a former smoker, 40 years. He also has a past medical history of PE, HTN, cardiomyopathy. He recently underwent an amputation of the right first toe due to an infection.\par \par S/p hospitalization at [facility] BMTU 10/3/19 - 11/1/19 for haplo (son) SCT. \par Upon admission, a TLC was placed in IR. Mr. Delong received IV fluid hydration, pain management, nutritional support, anxiolysis, antiemetics, and antiviral,  antifungal, antibacterial, GI, PCP and VOD prophylaxis. When his ANC dropped  below 1000, he was started on prophylactic Cefepime. Labs were monitored on a daily basis and he received transfusional support and electrolyte repletion as needed. \par \par While in patient, Mr. Delong was continued on his Voriconazole for history of aspergillosis. \par \par S/p hospitalization at [facility] BMTU 10/3/19 - 11/1/19 for haplo (son) SCT. \par Upon admission, a TLC was placed in IR. Mr. Delong received IV fluid hydration, pain management, nutritional support, anxiolysis, antiemetics, and antiviral, antifungal, antibacterial, GI, PCP and VOD prophylaxis. When his ANC dropped below 1000, he was started on prophylactic Cefepime. Labs were monitored on a daily basis and he received transfusional support and electrolyte repletion as needed. \par \par While in patient, Mr. Delong was continued on his Voriconazole for history of aspergillosis. \par \par During admission, Mr. Delong had pancytopenia related to the high dose chemotherapy prep regimen. He also experienced neutropenic fevers. When he became febrile, blood and urine cultures were sent and a CXR was completed which were unremarkable. \par \par On 10/9/2019, following premedications, pt received 250 ml of allogeneic, related, haplo, fresh, mobilized HPC apheresis over 30-60 minutes. \par Cell counts as follows: \par \par Total MNCs (x10^8/kg) = 5.17 \par CD 34 cells (x10^6/kg) = 7.34 \par CD 3 Cells (x 10^7/kg) = 19.81 \par Cell viability (%) = 100 \par \par Pt tolerated infusion without any adverse reaction or complications. \par \par Engraftment was noted on 10/28/2019. Daily Zarxio was discontinued, as was Cefepime given negative cultures. Mr. Delong was discharged home to f/u at the [facility].  [de-identified] : Patient presents today for a follow-up visit. He is s/p Haplo- transplant from his son on 10/9/19 (+58 of SCT today).  Patient has been receiving IV fluids in treatment room twice a week and holding Enalapril and Metoprolol. Overall feels well and offers no acute concerns. Currently on FK 0.5 mg BID and prednisone 20 mg daily.....Now Prednisone is reduced to 15 mg daily. He denies fever/chills, night sweats, mouth sores, eye dryness, blurred vision, nausea, vomiting, diarrhea. No CP, SOB or LE edema.  He takes Imodium prn and is compliant with all other medications.\par \par \par \par

## 2019-12-04 NOTE — PHYSICAL EXAM
[Ambulatory and capable of all self care but unable to carry out any work activities] : Status 2- Ambulatory and capable of all self care but unable to carry out any work activities. Up and about more than 50% of waking hours [Normal] : affect appropriate [de-identified] : s/p right first toe amputation.

## 2019-12-04 NOTE — REVIEW OF SYSTEMS
[Fatigue] : fatigue [Anxiety] : anxiety [Insomnia] : insomnia [Recent Change In Weight] : ~T recent weight change [Negative] : Neurological [Fever] : no fever [Night Sweats] : no night sweats [Chills] : no chills [Eye Pain] : no eye pain [Dry Eyes] : no dryness of the eyes [Vision Problems] : no vision problems [Nosebleeds] : no nosebleeds [Dysphagia] : no dysphagia [Odynophagia] : no odynophagia [Mucosal Pain] : no mucosal pain [Chest Pain] : no chest pain [Cough] : no cough [Lower Ext Edema] : no lower extremity edema [Constipation] : no constipation [Vomiting] : no vomiting [Joint Pain] : no joint pain [Diarrhea] : no diarrhea [Dysuria] : no dysuria [Skin Rash] : no skin rash [de-identified] : generalized dry skin  [Fainting] : no fainting

## 2019-12-05 DIAGNOSIS — E86.0 DEHYDRATION: ICD-10-CM

## 2019-12-06 ENCOUNTER — APPOINTMENT (OUTPATIENT)
Dept: INFUSION THERAPY | Facility: HOSPITAL | Age: 59
End: 2019-12-06

## 2019-12-10 ENCOUNTER — LABORATORY RESULT (OUTPATIENT)
Age: 59
End: 2019-12-10

## 2019-12-10 ENCOUNTER — APPOINTMENT (OUTPATIENT)
Dept: INFUSION THERAPY | Facility: HOSPITAL | Age: 59
End: 2019-12-10

## 2019-12-10 ENCOUNTER — RESULT REVIEW (OUTPATIENT)
Age: 59
End: 2019-12-10

## 2019-12-10 ENCOUNTER — APPOINTMENT (OUTPATIENT)
Dept: HEMATOLOGY ONCOLOGY | Facility: CLINIC | Age: 59
End: 2019-12-10
Payer: COMMERCIAL

## 2019-12-10 VITALS
WEIGHT: 178.77 LBS | SYSTOLIC BLOOD PRESSURE: 147 MMHG | BODY MASS INDEX: 24.25 KG/M2 | TEMPERATURE: 99 F | OXYGEN SATURATION: 98 % | HEART RATE: 98 BPM | RESPIRATION RATE: 16 BRPM | DIASTOLIC BLOOD PRESSURE: 97 MMHG

## 2019-12-10 LAB
BASOPHILS # BLD AUTO: 0.1 K/UL — SIGNIFICANT CHANGE UP (ref 0–0.2)
BASOPHILS NFR BLD AUTO: 0.8 % — SIGNIFICANT CHANGE UP (ref 0–2)
EOSINOPHIL # BLD AUTO: 0.1 K/UL — SIGNIFICANT CHANGE UP (ref 0–0.5)
EOSINOPHIL NFR BLD AUTO: 1.4 % — SIGNIFICANT CHANGE UP (ref 0–6)
HCT VFR BLD CALC: 30.1 % — LOW (ref 39–50)
HGB BLD-MCNC: 9.9 G/DL — LOW (ref 13–17)
LYMPHOCYTES # BLD AUTO: 2.7 K/UL — SIGNIFICANT CHANGE UP (ref 1–3.3)
LYMPHOCYTES # BLD AUTO: 34.9 % — SIGNIFICANT CHANGE UP (ref 13–44)
MCHC RBC-ENTMCNC: 32.7 PG — SIGNIFICANT CHANGE UP (ref 27–34)
MCHC RBC-ENTMCNC: 33 G/DL — SIGNIFICANT CHANGE UP (ref 32–36)
MCV RBC AUTO: 99 FL — SIGNIFICANT CHANGE UP (ref 80–100)
MONOCYTES # BLD AUTO: 0.3 K/UL — SIGNIFICANT CHANGE UP (ref 0–0.9)
MONOCYTES NFR BLD AUTO: 4.4 % — SIGNIFICANT CHANGE UP (ref 2–14)
NEUTROPHILS # BLD AUTO: 4.6 K/UL — SIGNIFICANT CHANGE UP (ref 1.8–7.4)
NEUTROPHILS NFR BLD AUTO: 58.5 % — SIGNIFICANT CHANGE UP (ref 43–77)
PLATELET # BLD AUTO: 49 K/UL — LOW (ref 150–400)
RBC # BLD: 3.04 M/UL — LOW (ref 4.2–5.8)
RBC # FLD: 20.6 % — HIGH (ref 10.3–14.5)
WBC # BLD: 7.8 K/UL — SIGNIFICANT CHANGE UP (ref 3.8–10.5)
WBC # FLD AUTO: 7.8 K/UL — SIGNIFICANT CHANGE UP (ref 3.8–10.5)

## 2019-12-10 PROCEDURE — 99214 OFFICE O/P EST MOD 30 MIN: CPT

## 2019-12-13 ENCOUNTER — APPOINTMENT (OUTPATIENT)
Dept: INFUSION THERAPY | Facility: HOSPITAL | Age: 59
End: 2019-12-13

## 2019-12-14 NOTE — HISTORY OF PRESENT ILLNESS
[de-identified] : Mr. Delong is a 58 y/o male, with a previously diagnosed acute myeloid leukemia. A bone marrow biopsy from 7/3 revealed Acute myeloid leukemia (AML). FISH - report shows a population of aberrant myeloid blasts. He is currently being treated by Dr. Coelho for high risk AML with HIDAC consolidation chemotherapy. He was referred by Dr. Coelho for an initial consultation of a Haplo- transplant. \par \par The patient has a history of multiple blood clots - factor V Leiden runs in the family. He was diagnosed with sleep apnea, but refuses to use CPAP. He is a former smoker, 40 years. He also has a past medical history of PE, HTN, cardiomyopathy. He recently underwent an amputation of the right first toe due to an infection.\par \par S/p hospitalization at [facility]MT[facility] 10/3/19 - 11/1/19 for haplo (son) SCT. \par Upon admission, a TLC was placed in IR. Mr. Delong received IV fluid hydration, pain management, nutritional support, anxiolysis, antiemetics, and antiviral,  antifungal, antibacterial, GI, PCP and VOD prophylaxis. When his ANC dropped  below 1000, he was started on prophylactic Cefepime. Labs were monitored on a daily basis and he received transfusional support and electrolyte repletion as needed. \par \par While in patient, Mr. Delong was continued on his Voriconazole for history of aspergillosis. \par \par S/p hospitalization at [facility]MT[facility] 10/3/19 - 11/1/19 for haplo (son) SCT. \par Upon admission, a TLC was placed in IR. Mr. Delong received IV fluid hydration, pain management, nutritional support, anxiolysis, antiemetics, and antiviral, antifungal, antibacterial, GI, PCP and VOD prophylaxis. When his ANC dropped below 1000, he was started on prophylactic Cefepime. Labs were monitored on a daily basis and he received transfusional support and electrolyte repletion as needed. \par \par While in patient, Mr. Delong was continued on his Voriconazole for history of aspergillosis. \par \par During admission, Mr. Delong had pancytopenia related to the high dose chemotherapy prep regimen. He also experienced neutropenic fevers. When he became febrile, blood and urine cultures were sent and a CXR was completed which were unremarkable. \par \par On 10/9/2019, following premedications, pt received 250 ml of allogeneic, related, haplo, fresh, mobilized HPC apheresis over 30-60 minutes. \par Cell counts as follows: \par \par Total MNCs (x10^8/kg) = 5.17 \par CD 34 cells (x10^6/kg) = 7.34 \par CD 3 Cells (x 10^7/kg) = 19.81 \par Cell viability (%) = 100 \par \par Pt tolerated infusion without any adverse reaction or complications. \par \par Engraftment was noted on 10/28/2019. Daily Zarxio was discontinued, as was Cefepime given negative cultures. Mr. Delong was discharged home to f/u at the [facility].  [de-identified] : On 12/10/19 visit, day +62 SCT today. Continues weekly IV hydration with NS 750mL which he received in the tx room today. On prednisone 15mg daily. Continues to feel well with improved PO intake and hydration though he would like to continue with weekly IVF for now. Compliant with post transplant meds and restrictions. Currently taking FK 0.5mg bid which he did not take prior to lab draw today. Denies fever, chills, headache, dizziness, blurred vision, mucositis/odynophagia, chest pain, SOB, cough, nausea/vomiting, diarrhea/constipation, abdominal pain, dysuria, rash, bleeding, pain

## 2019-12-14 NOTE — PHYSICAL EXAM
[Ambulatory and capable of all self care but unable to carry out any work activities] : Status 2- Ambulatory and capable of all self care but unable to carry out any work activities. Up and about more than 50% of waking hours [Normal] : affect appropriate [de-identified] : trace BLE edema [de-identified] : +BS; abdomen soft, non-distended, non-tender [de-identified] : s/p right first toe amputation

## 2019-12-14 NOTE — ASSESSMENT
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\par 10/30/19 chimerism = 100% donor\par 11/14/19 chimerism = 100% donor\par 11/26/19 chimerism = 100%\par Post transplant BMBx pending\par \par 2) Heme\par Pancytopenia poss 2/2 Valcyte, no indication for transfusion today\par    12/10/19:   WBC 7.8   ANC 4.6   Hgb 9.9   PLT 49\par Monitor counts closely while on Valcyte, started 11/20/19 \par Continue multivitamin and folic acid daily\par \par Hx of recurrent DVT/PE\par Factor V Leiden reportedly runs in the family \par 11/21/19 Doppler US of RUE negative for DVT\par Xarelto on hold while pancytopenic, to resume when PLT consistently >100K\par \par 3) ID\par Continue ppx:\par - Mepron 750 mg bid\par - Voriconazole 200 mg bid - hx of aspergillosis \par Post transplant crowd and dietary restrictions reviewed\par \par CMV viremia\par 11/7/19 CMV PCR = 247\par 11/14/19 CMV PCR = 2,205\par 11/20/19 CMV PCR = 8,581\par 11/26/19 CMV PCR = 4,103\par 12/4/19 CMV PCR = 215\par Continue Valcyte 450mg bid, started 11/21/19\par Continue to monitor PCR weekly \par \par 4) GVHD\par Skin 0   Liver 0   GI 0 - overall grade 0\par No signs/symptoms of acute or chronic GVHD at this time\par Continue FK 0.5 mg bid - pending today''s level \par Off MMF as of 11/22/19 \par Reviewed signs/symptoms of GVHD (i.e. rash, mucositis, nausea/vomiting, diarrhea)\par \par 5) GI\par Continue ppx:\par - Protonix 40 mg daily\par - Ursodiol 300 mg bid\par PRN Zofran and Reglan for nausea/vomiting\par \par 6) Failure to thrive\par Poor PO intake/hydration with resulting hypotension\par Prednisone 20mg daily started 11/20/19\par Prednisone decreased to 10mg daily as of 12/10/19\par Decrease IVF once a week until PO intake improves, 750mL/2hrs given cardiomyopathy \par Encouraged to hydrate adequately and eat small frequent meals/snacks\par \par 7) Other\par HTN - enalapril 2.5mg daily and metoprolol succinate 12.5mg daily on hold in setting of hypotension and FTT\par BPH - continue finasteride 5mg daily and tamsulosin 0.4mg daily \par Hypomagnesemia - likely 2/2 FK, continue MgOx 400mg tid\par Anxiety/Depression - continue Zoloft 50mg daily and PRN Xanax 0.5mg qhs\par \par 8) Plan/Dispo\par Pt educated regarding plan of care, all questions/concerns addressed\par Continue weekly appts until approx day +100\par F/u with Dr. Gaytan on 12/17/19

## 2019-12-14 NOTE — REVIEW OF SYSTEMS
[Fever] : no fever [Fatigue] : fatigue [Eye Pain] : no eye pain [Dry Eyes] : no dryness of the eyes [Dysphagia] : no dysphagia [Nosebleeds] : no nosebleeds [Odynophagia] : no odynophagia [Mucosal Pain] : no mucosal pain [Chest Pain] : no chest pain [Shortness Of Breath] : no shortness of breath [Cough] : no cough [Abdominal Pain] : no abdominal pain [Constipation] : no constipation [Diarrhea] : no diarrhea [Dysuria] : no dysuria [Joint Pain] : no joint pain [Muscle Pain] : no muscle pain [Skin Rash] : no skin rash [Dizziness] : no dizziness [Fainting] : no fainting [Negative] : Heme/Lymph [FreeTextEntry8] : .

## 2019-12-17 ENCOUNTER — LABORATORY RESULT (OUTPATIENT)
Age: 59
End: 2019-12-17

## 2019-12-17 ENCOUNTER — OUTPATIENT (OUTPATIENT)
Dept: OUTPATIENT SERVICES | Facility: HOSPITAL | Age: 59
LOS: 1 days | End: 2019-12-17

## 2019-12-17 ENCOUNTER — RESULT REVIEW (OUTPATIENT)
Age: 59
End: 2019-12-17

## 2019-12-17 ENCOUNTER — APPOINTMENT (OUTPATIENT)
Dept: HEMATOLOGY ONCOLOGY | Facility: CLINIC | Age: 59
End: 2019-12-17
Payer: COMMERCIAL

## 2019-12-17 ENCOUNTER — APPOINTMENT (OUTPATIENT)
Dept: INFUSION THERAPY | Facility: HOSPITAL | Age: 59
End: 2019-12-17

## 2019-12-17 VITALS
BODY MASS INDEX: 25.12 KG/M2 | DIASTOLIC BLOOD PRESSURE: 65 MMHG | OXYGEN SATURATION: 97 % | TEMPERATURE: 97.5 F | SYSTOLIC BLOOD PRESSURE: 123 MMHG | RESPIRATION RATE: 18 BRPM | WEIGHT: 185.19 LBS | HEART RATE: 100 BPM

## 2019-12-17 DIAGNOSIS — C92.00 ACUTE MYELOBLASTIC LEUKEMIA, NOT HAVING ACHIEVED REMISSION: ICD-10-CM

## 2019-12-17 LAB
BASOPHILS # BLD AUTO: 0 K/UL — SIGNIFICANT CHANGE UP (ref 0–0.2)
BASOPHILS NFR BLD AUTO: 0.1 % — SIGNIFICANT CHANGE UP (ref 0–2)
EOSINOPHIL # BLD AUTO: 0.1 K/UL — SIGNIFICANT CHANGE UP (ref 0–0.5)
EOSINOPHIL NFR BLD AUTO: 1.7 % — SIGNIFICANT CHANGE UP (ref 0–6)
HCT VFR BLD CALC: 31.4 % — LOW (ref 39–50)
HGB BLD-MCNC: 10.5 G/DL — LOW (ref 13–17)
LYMPHOCYTES # BLD AUTO: 2.3 K/UL — SIGNIFICANT CHANGE UP (ref 1–3.3)
LYMPHOCYTES # BLD AUTO: 40.2 % — SIGNIFICANT CHANGE UP (ref 13–44)
MCHC RBC-ENTMCNC: 33.4 G/DL — SIGNIFICANT CHANGE UP (ref 32–36)
MCHC RBC-ENTMCNC: 33.5 PG — SIGNIFICANT CHANGE UP (ref 27–34)
MCV RBC AUTO: 100 FL — SIGNIFICANT CHANGE UP (ref 80–100)
MONOCYTES # BLD AUTO: 0.3 K/UL — SIGNIFICANT CHANGE UP (ref 0–0.9)
MONOCYTES NFR BLD AUTO: 4.4 % — SIGNIFICANT CHANGE UP (ref 2–14)
NEUTROPHILS # BLD AUTO: 3.1 K/UL — SIGNIFICANT CHANGE UP (ref 1.8–7.4)
NEUTROPHILS NFR BLD AUTO: 53.6 % — SIGNIFICANT CHANGE UP (ref 43–77)
PLATELET # BLD AUTO: 35 K/UL — LOW (ref 150–400)
RBC # BLD: 3.13 M/UL — LOW (ref 4.2–5.8)
RBC # FLD: 19.9 % — HIGH (ref 10.3–14.5)
WBC # BLD: 5.8 K/UL — SIGNIFICANT CHANGE UP (ref 3.8–10.5)
WBC # FLD AUTO: 5.8 K/UL — SIGNIFICANT CHANGE UP (ref 3.8–10.5)

## 2019-12-17 PROCEDURE — 99215 OFFICE O/P EST HI 40 MIN: CPT

## 2019-12-17 NOTE — ADDENDUM
[FreeTextEntry1] : Documented by Rick Moore acting as a scribe for Dr. Rosina Gaytan on 12/17/2019.\par \par All medical record entries made by the Scribe were at my, Dr. Rosina Gaytan, direction and personally dictated by me on 12/17/2019. I have reviewed the chart and agree that  the record accurately reflects my personal performance of the history, physical exam, assessment and plan. I have also personally directed, reviewed, and agree with the discharge instructions.

## 2019-12-17 NOTE — HISTORY OF PRESENT ILLNESS
[de-identified] : Patient presents today for a follow-up visit s/p Haplo- transplant (son) on 10/9/19. Patient has been receiving IV fluids in treatment room once weekly and holding Enalapril and Metoprolol. He notes slight LE edema last week which quickly resolved. Overall feels well and offers no acute concerns. Currently on FK 0.5mg BID and prednisone 10mg QD. Did not take FK prior to blood work today. Denies fever/chills, night sweats, mouth sores, eye dryness, blurred vision, nausea, vomiting, diarrhea. No CP, SOB or LE edema. Remains compliant with medications. Patient inquires about travelling. [de-identified] : Mr. Delong is a 60 y/o male, with a previously diagnosed acute myeloid leukemia. A bone marrow biopsy from 7/3 revealed Acute myeloid leukemia (AML). FISH - report shows a population of aberrant myeloid blasts. He is currently being treated by Dr. Coelho for high risk AML with HIDAC consolidation chemotherapy. He was referred by Dr. Coelho for an initial consultation of a Haplo- transplant. \par \par The patient has a history of multiple blood clots - factor V Leiden runs in the family. He was diagnosed with sleep apnea, but refuses to use CPAP. He is a former smoker, 40 years. He also has a past medical history of PE, HTN, cardiomyopathy. He recently underwent an amputation of the right first toe due to an infection.\par \par S/p hospitalization at [facility]MT[facility] 10/3/19 - 11/1/19 for haplo (son) SCT. \par Upon admission, a TLC was placed in IR. Mr. Delong received IV fluid hydration, pain management, nutritional support, anxiolysis, antiemetics, and antiviral,  antifungal, antibacterial, GI, PCP and VOD prophylaxis. When his ANC dropped  below 1000, he was started on prophylactic Cefepime. Labs were monitored on a daily basis and he received transfusional support and electrolyte repletion as needed. \par \par While in patient, Mr. Delong was continued on his Voriconazole for history of aspergillosis. \par \par S/p hospitalization at [facility]MT[facility] 10/3/19 - 11/1/19 for haplo (son) SCT. \par Upon admission, a TLC was placed in IR. Mr. Delong received IV fluid hydration, pain management, nutritional support, anxiolysis, antiemetics, and antiviral, antifungal, antibacterial, GI, PCP and VOD prophylaxis. When his ANC dropped below 1000, he was started on prophylactic Cefepime. Labs were monitored on a daily basis and he received transfusional support and electrolyte repletion as needed. \par \par While in patient, Mr. Delong was continued on his Voriconazole for history of aspergillosis. \par \par During admission, Mr. Delong had pancytopenia related to the high dose chemotherapy prep regimen. He also experienced neutropenic fevers. When he became febrile, blood and urine cultures were sent and a CXR was completed which were unremarkable. \par \par On 10/9/2019, following premedications, pt received 250 ml of allogeneic, related, haplo, fresh, mobilized HPC apheresis over 30-60 minutes. \par Cell counts as follows: \par \par Total MNCs (x10^8/kg) = 5.17 \par CD 34 cells (x10^6/kg) = 7.34 \par CD 3 Cells (x 10^7/kg) = 19.81 \par Cell viability (%) = 100 \par \par Pt tolerated infusion without any adverse reaction or complications. \par \par Engraftment was noted on 10/28/2019. Daily Zarxio was discontinued, as was Cefepime given negative cultures. Mr. Delong was discharged home to f/u at the [facility].

## 2019-12-17 NOTE — ASSESSMENT
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\par 10/30/19 chimerism = 100% donor\par 11/14/19 chimerism = 100% donor\par 11/26/19 chimerism = 100% donor\par 12/4/19 chimerism: 100% donor\par Post transplant BMBx pending\par \par 2) Heme\par Counts stable but remains pancytopenic, no indication for transfusion today. Low platelets. Will return for repeat blood work on Friday (12/20/19).\par WBC 5.8 HGB 10.5 ANC 3.1 PLT 35\par Monitor counts closely while on Valcyte, started 11/20/19 ..decrease to QD\par Continue multivitamin and folic acid daily\par \par Hx of recurrent DVT/PE\par Factor V Leiden reportedly runs in the family \par 11/21/19 Doppler US of RUE negative for DVT\par Xarelto on hold while pancytopenic, to resume when PLT consistently >100K\par \par 3) ID\par Continue ppx:\par - Mepron 750 mg bid\par - Voriconazole 200 mg bid - hx of aspergillosis \par Post transplant crowd and dietary restrictions reviewed\par \par CMV viremia\par 11/7/19 CMV PCR = 247\par 11/14/19 CMV PCR = 2,205\par 11/20/19 CMV PCR = 8,581\par 11/26/19 CMV PCR = 4103\par 12/4/19 CMV PCR = 215\par 12/10/19 CMV PCR= not detected\par Valcyte 450mg BID started 11/21/19. Decrease to QD as of 12/17/19.\par Continue to monitor PCR weekly \par \par 4) GVHD\par Skin 0 Liver 0 GI 0 - overall grade 0\par No signs/symptoms of acute or chronic GVHD at this time\par Continue FK 0.5mg BID\par Off MMF as of 11/22/19 \par Reviewed signs/symptoms of GVHD (i.e. rash, mucositis, nausea/vomiting, diarrhea)\par \par 5) GI\par Continue ppx:\par - Protonix 40 mg daily\par - Ursodiol 300 mg bid\par PRN Zofran and Reglan for nausea/vomiting\par \par 6) Failure to thrive\par Poor PO intake/hydration with resulting hypotension\par Prednisone 20 mg daily started 11/20/19....Prednisone lowered to 15mg daily.....Continue Prednisone 10mg QD. Will taper Prednisone to 5mg QD in one week.\par Decrease IV fluid to once a week until PO intake improves, 750mL/2hrs given cardiomyopathy - cancelling IV fluid for 12/17/19, 12/24/19, and 1/2/20.\par Encouraged to hydrate adequately and eat small frequent meals/snacks\par \par 7) Other\par HTN - enalapril 2.5mg daily and metoprolol succinate 12.5mg daily on hold in setting of hypotension and FTT\par BPH - continue finasteride 5mg daily and tamsulosin 0.4mg daily \par Hypomagnesemia - likely 2/2 FK, continue MgOx 400mg tid\par Anxiety/Depression - continue Zoloft 50mg daily and PRN Xanax 0.5mg qhs\par \par 8) Plan/Dispo\par Pt educated regarding plan of care, all questions/concerns addressed\par Peripheral blood work reviewed and discussed with patient.\par Labs sent for CMP, LDH, Mg, CD3 and CD33 Enrichment, CMV by PCR, Engraftment, Tacrolimus Whole Blood.\par IV fluid appointments for 12/17, 12/24, and 1/2 cancelled.\par Decrease Valcyte 450mg to QD as of 12/17/19.\par Advised against travel at this time.\par Maintain weekly appointments at this time.\par RTC on 12/20 for repeat blood work. RTC for f/u with YADIRA Arias on 12/24. RTC for f/u with Dr. Gaytan on 1/2.

## 2019-12-17 NOTE — PHYSICAL EXAM
[Ambulatory and capable of all self care but unable to carry out any work activities] : Status 2- Ambulatory and capable of all self care but unable to carry out any work activities. Up and about more than 50% of waking hours [Normal] : affect appropriate [de-identified] : s/p right first toe amputation.

## 2019-12-19 ENCOUNTER — APPOINTMENT (OUTPATIENT)
Dept: HEMATOLOGY ONCOLOGY | Facility: CLINIC | Age: 59
End: 2019-12-19

## 2019-12-19 ENCOUNTER — RESULT REVIEW (OUTPATIENT)
Age: 59
End: 2019-12-19

## 2019-12-19 LAB
ALBUMIN SERPL ELPH-MCNC: 3.7 G/DL
ALP BLD-CCNC: 63 U/L
ALT SERPL-CCNC: 7 U/L
ANION GAP SERPL CALC-SCNC: 11 MMOL/L
AST SERPL-CCNC: 13 U/L
BASOPHILS # BLD AUTO: 0 K/UL — SIGNIFICANT CHANGE UP (ref 0–0.2)
BASOPHILS NFR BLD AUTO: 0.6 % — SIGNIFICANT CHANGE UP (ref 0–2)
BILIRUB SERPL-MCNC: 0.2 MG/DL
BUN SERPL-MCNC: 19 MG/DL
CALCIUM SERPL-MCNC: 8.9 MG/DL
CHLORIDE SERPL-SCNC: 103 MMOL/L
CO2 SERPL-SCNC: 24 MMOL/L
CREAT SERPL-MCNC: 1.02 MG/DL
EOSINOPHIL # BLD AUTO: 0.1 K/UL — SIGNIFICANT CHANGE UP (ref 0–0.5)
EOSINOPHIL NFR BLD AUTO: 1.2 % — SIGNIFICANT CHANGE UP (ref 0–6)
GLUCOSE SERPL-MCNC: 172 MG/DL
HCT VFR BLD CALC: 30.8 % — LOW (ref 39–50)
HGB BLD-MCNC: 10.2 G/DL — LOW (ref 13–17)
LYMPHOCYTES # BLD AUTO: 2 K/UL — SIGNIFICANT CHANGE UP (ref 1–3.3)
LYMPHOCYTES # BLD AUTO: 33.8 % — SIGNIFICANT CHANGE UP (ref 13–44)
MCHC RBC-ENTMCNC: 33 G/DL — SIGNIFICANT CHANGE UP (ref 32–36)
MCHC RBC-ENTMCNC: 33 PG — SIGNIFICANT CHANGE UP (ref 27–34)
MCV RBC AUTO: 99.9 FL — SIGNIFICANT CHANGE UP (ref 80–100)
MONOCYTES # BLD AUTO: 0.2 K/UL — SIGNIFICANT CHANGE UP (ref 0–0.9)
MONOCYTES NFR BLD AUTO: 3 % — SIGNIFICANT CHANGE UP (ref 2–14)
NEUTROPHILS # BLD AUTO: 3.7 K/UL — SIGNIFICANT CHANGE UP (ref 1.8–7.4)
NEUTROPHILS NFR BLD AUTO: 61.4 % — SIGNIFICANT CHANGE UP (ref 43–77)
PLATELET # BLD AUTO: 37 K/UL — LOW (ref 150–400)
POTASSIUM SERPL-SCNC: 4.7 MMOL/L
PROT SERPL-MCNC: 5.5 G/DL
RBC # BLD: 3.08 M/UL — LOW (ref 4.2–5.8)
RBC # FLD: 19.5 % — HIGH (ref 10.3–14.5)
SODIUM SERPL-SCNC: 138 MMOL/L
TACROLIMUS SERPL-MCNC: 12 NG/ML
WBC # BLD: 6 K/UL — SIGNIFICANT CHANGE UP (ref 3.8–10.5)
WBC # FLD AUTO: 6 K/UL — SIGNIFICANT CHANGE UP (ref 3.8–10.5)

## 2019-12-20 ENCOUNTER — APPOINTMENT (OUTPATIENT)
Dept: INFUSION THERAPY | Facility: HOSPITAL | Age: 59
End: 2019-12-20

## 2019-12-24 ENCOUNTER — APPOINTMENT (OUTPATIENT)
Dept: INFUSION THERAPY | Facility: HOSPITAL | Age: 59
End: 2019-12-24

## 2019-12-24 ENCOUNTER — LABORATORY RESULT (OUTPATIENT)
Age: 59
End: 2019-12-24

## 2019-12-24 ENCOUNTER — APPOINTMENT (OUTPATIENT)
Dept: HEMATOLOGY ONCOLOGY | Facility: CLINIC | Age: 59
End: 2019-12-24
Payer: COMMERCIAL

## 2019-12-24 ENCOUNTER — RESULT REVIEW (OUTPATIENT)
Age: 59
End: 2019-12-24

## 2019-12-24 VITALS
RESPIRATION RATE: 18 BRPM | SYSTOLIC BLOOD PRESSURE: 136 MMHG | HEART RATE: 105 BPM | TEMPERATURE: 98 F | OXYGEN SATURATION: 97 % | BODY MASS INDEX: 24.79 KG/M2 | WEIGHT: 182.76 LBS | DIASTOLIC BLOOD PRESSURE: 89 MMHG

## 2019-12-24 LAB
BASOPHILS # BLD AUTO: 0 K/UL — SIGNIFICANT CHANGE UP (ref 0–0.2)
BASOPHILS NFR BLD AUTO: 0.4 % — SIGNIFICANT CHANGE UP (ref 0–2)
EOSINOPHIL # BLD AUTO: 0.1 K/UL — SIGNIFICANT CHANGE UP (ref 0–0.5)
EOSINOPHIL NFR BLD AUTO: 1.6 % — SIGNIFICANT CHANGE UP (ref 0–6)
HCT VFR BLD CALC: 31.5 % — LOW (ref 39–50)
HGB BLD-MCNC: 10.7 G/DL — LOW (ref 13–17)
LYMPHOCYTES # BLD AUTO: 1.8 K/UL — SIGNIFICANT CHANGE UP (ref 1–3.3)
LYMPHOCYTES # BLD AUTO: 38 % — SIGNIFICANT CHANGE UP (ref 13–44)
MCHC RBC-ENTMCNC: 34 G/DL — SIGNIFICANT CHANGE UP (ref 32–36)
MCHC RBC-ENTMCNC: 34.3 PG — HIGH (ref 27–34)
MCV RBC AUTO: 101 FL — HIGH (ref 80–100)
MONOCYTES # BLD AUTO: 0.3 K/UL — SIGNIFICANT CHANGE UP (ref 0–0.9)
MONOCYTES NFR BLD AUTO: 6.2 % — SIGNIFICANT CHANGE UP (ref 2–14)
NEUTROPHILS # BLD AUTO: 2.6 K/UL — SIGNIFICANT CHANGE UP (ref 1.8–7.4)
NEUTROPHILS NFR BLD AUTO: 53.9 % — SIGNIFICANT CHANGE UP (ref 43–77)
PLATELET # BLD AUTO: 37 K/UL — LOW (ref 150–400)
RBC # BLD: 3.13 M/UL — LOW (ref 4.2–5.8)
RBC # FLD: 19.5 % — HIGH (ref 10.3–14.5)
WBC # BLD: 4.8 K/UL — SIGNIFICANT CHANGE UP (ref 3.8–10.5)
WBC # FLD AUTO: 4.8 K/UL — SIGNIFICANT CHANGE UP (ref 3.8–10.5)

## 2019-12-24 PROCEDURE — 99214 OFFICE O/P EST MOD 30 MIN: CPT

## 2019-12-26 NOTE — PHYSICAL EXAM
[Ambulatory and capable of all self care but unable to carry out any work activities] : Status 2- Ambulatory and capable of all self care but unable to carry out any work activities. Up and about more than 50% of waking hours [de-identified] : trace BLE edema [Normal] : affect appropriate [de-identified] : +BS; abdomen soft, non-distended, non-tender [de-identified] : s/p right first toe amputation

## 2019-12-26 NOTE — HISTORY OF PRESENT ILLNESS
[de-identified] : On 12/24/19 visit, day +76 of SCT today. Overall continues to feel well with good energy and adequate PO intake and hydration. Compliant with post transplant meds and restrictions. Currently taking prednisone 10mg daily. Also alternating FK 0.5mg daily and FK 0.5mg bid, he did take dose this AM before lab draw. Denies fever, chills, headache, dizziness, blurred vision, mucositis/odynophagia, chest pain, SOB, cough, nausea/vomiting, diarrhea/constipation, abdominal pain, dysuria, LE edema, rash, bleeding, pain  [de-identified] : Mr. Delong is a 58 y/o male, with a previously diagnosed acute myeloid leukemia. A bone marrow biopsy from 7/3 revealed Acute myeloid leukemia (AML). FISH - report shows a population of aberrant myeloid blasts. He is currently being treated by Dr. Coelho for high risk AML with HIDAC consolidation chemotherapy. He was referred by Dr. Coelho for an initial consultation of a Haplo- transplant. \par \par The patient has a history of multiple blood clots - factor V Leiden runs in the family. He was diagnosed with sleep apnea, but refuses to use CPAP. He is a former smoker, 40 years. He also has a past medical history of PE, HTN, cardiomyopathy. He recently underwent an amputation of the right first toe due to an infection.\par \par S/p hospitalization at [facility]MT[facility] 10/3/19 - 11/1/19 for haplo (son) SCT. \par Upon admission, a TLC was placed in IR. Mr. Delong received IV fluid hydration, pain management, nutritional support, anxiolysis, antiemetics, and antiviral,  antifungal, antibacterial, GI, PCP and VOD prophylaxis. When his ANC dropped  below 1000, he was started on prophylactic Cefepime. Labs were monitored on a daily basis and he received transfusional support and electrolyte repletion as needed. \par \par While in patient, Mr. Delong was continued on his Voriconazole for history of aspergillosis. \par \par S/p hospitalization at [facility] BMT[facility] 10/3/19 - 11/1/19 for haplo (son) SCT. \par Upon admission, a TLC was placed in IR. Mr. Delong received IV fluid hydration, pain management, nutritional support, anxiolysis, antiemetics, and antiviral, antifungal, antibacterial, GI, PCP and VOD prophylaxis. When his ANC dropped below 1000, he was started on prophylactic Cefepime. Labs were monitored on a daily basis and he received transfusional support and electrolyte repletion as needed. \par \par While in patient, Mr. Delong was continued on his Voriconazole for history of aspergillosis. \par \par During admission, Mr. Delong had pancytopenia related to the high dose chemotherapy prep regimen. He also experienced neutropenic fevers. When he became febrile, blood and urine cultures were sent and a CXR was completed which were unremarkable. \par \par On 10/9/2019, following premedications, pt received 250 ml of allogeneic, related, haplo, fresh, mobilized HPC apheresis over 30-60 minutes. \par Cell counts as follows: \par \par Total MNCs (x10^8/kg) = 5.17 \par CD 34 cells (x10^6/kg) = 7.34 \par CD 3 Cells (x 10^7/kg) = 19.81 \par Cell viability (%) = 100 \par \par Pt tolerated infusion without any adverse reaction or complications. \par \par Engraftment was noted on 10/28/2019. Daily Zarxio was discontinued, as was Cefepime given negative cultures. Mr. Delong was discharged home to f/u at the [facility].

## 2019-12-26 NOTE — ASSESSMENT
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\par 10/30/19 chimerism = 100% donor\par 11/14/19 chimerism = 100% donor\par 11/26/19 chimerism = 100% donor\par 12/10/19 chimerism = 100% donor\par Post transplant BMBx pending\par \par 2) Heme\par Pancytopenia poss 2/2 Valcyte, no indication for transfusion today\par    12/24/19:   WBC 4.8   ANC 2.6   Hgb 10.7   PLT 37\par Monitor counts closely while on Valcyte, started 11/20/19 \par Continue multivitamin and folic acid daily\par \par Hx of recurrent DVT/PE\par Factor V Leiden reportedly runs in the family \par 11/21/19 Doppler US of RUE negative for DVT\par Xarelto on hold while pancytopenic, to resume when PLT consistently >100K\par \par 3) ID\par Continue ppx:\par - Mepron 750 mg bid\par - Voriconazole 200 mg bid - hx of aspergillosis \par Post transplant crowd and dietary restrictions reviewed\par \par CMV viremia\par 11/7/19 CMV PCR = 247\par 11/14/19 CMV PCR = 2,205\par 11/20/19 CMV PCR = 8,581\par 11/26/19 CMV PCR = 4,103\par 12/4/19 CMV PCR = 215\par 12/17/19 CMV PCR not detected\par Continue Valcyte 450mg bid, started 11/21/19\par Continue to monitor PCR weekly \par \par 4) GVHD\par Skin 0   Liver 0   GI 0 - overall grade 0\par No signs/symptoms of acute or chronic GVHD at this time\par Continue alternating FK 0.5mg daily and 0.5mg bid - he took dose this AM before lab draw\par Off MMF as of 11/22/19 \par Reviewed signs/symptoms of GVHD (i.e. rash, mucositis, nausea/vomiting, diarrhea)\par \par 5) GI\par Continue ppx:\par - Protonix 40 mg daily\par - Ursodiol 300 mg bid\par PRN Zofran and Reglan for nausea/vomiting\par \par 6) Failure to thrive\par Poor PO intake/hydration with resulting hypotension\par Prednisone 20mg daily started 11/20/19\par Prednisone decreased to 5mg daily as of 12/24/19\par Encouraged to hydrate adequately and eat small frequent meals/snacks\par \par 7) Other\par HTN - resumed metoprolol succinate 12.5mg daily on 12/24/19, continue to hold enalapril 2.5mg daily for now and monitor BP\par BPH - continue finasteride 5mg daily and tamsulosin 0.4mg daily \par Hypomagnesemia - likely 2/2 FK, continue MgOx 400mg tid\par Anxiety/Depression - continue Zoloft 50mg daily and PRN Xanax 0.5mg qhs\par \par 8) Plan/Dispo\par Pt educated regarding plan of care, all questions/concerns addressed\par Continue weekly appts until approx day +100\par F/u with Dr. Gaytan on 1/2/20

## 2019-12-26 NOTE — REVIEW OF SYSTEMS
[Fever] : no fever [Eye Pain] : no eye pain [Fatigue] : fatigue [Dry Eyes] : no dryness of the eyes [Nosebleeds] : no nosebleeds [Odynophagia] : no odynophagia [Dysphagia] : no dysphagia [Mucosal Pain] : no mucosal pain [Chest Pain] : no chest pain [Cough] : no cough [Shortness Of Breath] : no shortness of breath [Constipation] : no constipation [Abdominal Pain] : no abdominal pain [Diarrhea] : no diarrhea [Dysuria] : no dysuria [Muscle Pain] : no muscle pain [Skin Rash] : no skin rash [Joint Pain] : no joint pain [Fainting] : no fainting [Dizziness] : no dizziness [Negative] : Psychiatric [FreeTextEntry8] : .

## 2019-12-27 ENCOUNTER — OUTPATIENT (OUTPATIENT)
Dept: OUTPATIENT SERVICES | Facility: HOSPITAL | Age: 59
LOS: 1 days | Discharge: HOME | End: 2019-12-27

## 2019-12-27 ENCOUNTER — APPOINTMENT (OUTPATIENT)
Dept: INFUSION THERAPY | Facility: HOSPITAL | Age: 59
End: 2019-12-27

## 2019-12-27 DIAGNOSIS — C92.00 ACUTE MYELOBLASTIC LEUKEMIA, NOT HAVING ACHIEVED REMISSION: ICD-10-CM

## 2020-01-01 ENCOUNTER — APPOINTMENT (OUTPATIENT)
Dept: HEMATOLOGY ONCOLOGY | Facility: CLINIC | Age: 60
End: 2020-01-01
Payer: COMMERCIAL

## 2020-01-01 ENCOUNTER — RX RENEWAL (OUTPATIENT)
Age: 60
End: 2020-01-01

## 2020-01-01 ENCOUNTER — APPOINTMENT (OUTPATIENT)
Dept: INFUSION THERAPY | Facility: HOSPITAL | Age: 60
End: 2020-01-01

## 2020-01-01 ENCOUNTER — LABORATORY RESULT (OUTPATIENT)
Age: 60
End: 2020-01-01

## 2020-01-01 ENCOUNTER — RESULT REVIEW (OUTPATIENT)
Age: 60
End: 2020-01-01

## 2020-01-01 ENCOUNTER — APPOINTMENT (OUTPATIENT)
Dept: DISASTER EMERGENCY | Facility: CLINIC | Age: 60
End: 2020-01-01

## 2020-01-01 ENCOUNTER — APPOINTMENT (OUTPATIENT)
Dept: CARDIOLOGY | Facility: CLINIC | Age: 60
End: 2020-01-01
Payer: COMMERCIAL

## 2020-01-01 ENCOUNTER — OUTPATIENT (OUTPATIENT)
Dept: OUTPATIENT SERVICES | Facility: HOSPITAL | Age: 60
LOS: 1 days | End: 2020-01-01
Payer: COMMERCIAL

## 2020-01-01 ENCOUNTER — APPOINTMENT (OUTPATIENT)
Dept: HEMATOLOGY ONCOLOGY | Facility: CLINIC | Age: 60
End: 2020-01-01

## 2020-01-01 ENCOUNTER — APPOINTMENT (OUTPATIENT)
Dept: CARDIOLOGY | Facility: CLINIC | Age: 60
End: 2020-01-01

## 2020-01-01 ENCOUNTER — OUTPATIENT (OUTPATIENT)
Dept: OUTPATIENT SERVICES | Facility: HOSPITAL | Age: 60
LOS: 1 days | Discharge: HOME | End: 2020-01-01

## 2020-01-01 ENCOUNTER — APPOINTMENT (OUTPATIENT)
Dept: RADIOLOGY | Facility: IMAGING CENTER | Age: 60
End: 2020-01-01
Payer: COMMERCIAL

## 2020-01-01 VITALS
SYSTOLIC BLOOD PRESSURE: 96 MMHG | HEART RATE: 88 BPM | DIASTOLIC BLOOD PRESSURE: 61 MMHG | HEIGHT: 70.08 IN | TEMPERATURE: 97.5 F | BODY MASS INDEX: 28.22 KG/M2 | WEIGHT: 197.09 LBS | RESPIRATION RATE: 17 BRPM | OXYGEN SATURATION: 96 %

## 2020-01-01 VITALS
HEIGHT: 71 IN | BODY MASS INDEX: 26.88 KG/M2 | DIASTOLIC BLOOD PRESSURE: 70 MMHG | HEART RATE: 88 BPM | WEIGHT: 192 LBS | SYSTOLIC BLOOD PRESSURE: 96 MMHG | OXYGEN SATURATION: 96 %

## 2020-01-01 VITALS
BODY MASS INDEX: 27.44 KG/M2 | HEART RATE: 93 BPM | SYSTOLIC BLOOD PRESSURE: 93 MMHG | WEIGHT: 196 LBS | HEIGHT: 71 IN | OXYGEN SATURATION: 95 % | DIASTOLIC BLOOD PRESSURE: 60 MMHG

## 2020-01-01 VITALS
BODY MASS INDEX: 28 KG/M2 | HEIGHT: 71 IN | OXYGEN SATURATION: 92 % | SYSTOLIC BLOOD PRESSURE: 99 MMHG | HEART RATE: 91 BPM | WEIGHT: 200 LBS | DIASTOLIC BLOOD PRESSURE: 70 MMHG

## 2020-01-01 VITALS
OXYGEN SATURATION: 99 % | TEMPERATURE: 97.2 F | DIASTOLIC BLOOD PRESSURE: 76 MMHG | HEART RATE: 98 BPM | BODY MASS INDEX: 27.98 KG/M2 | SYSTOLIC BLOOD PRESSURE: 111 MMHG | RESPIRATION RATE: 17 BRPM | WEIGHT: 200.62 LBS

## 2020-01-01 VITALS
TEMPERATURE: 97.4 F | OXYGEN SATURATION: 99 % | DIASTOLIC BLOOD PRESSURE: 73 MMHG | RESPIRATION RATE: 17 BRPM | SYSTOLIC BLOOD PRESSURE: 105 MMHG | HEART RATE: 105 BPM | WEIGHT: 201.06 LBS | BODY MASS INDEX: 27.29 KG/M2

## 2020-01-01 VITALS
WEIGHT: 201 LBS | DIASTOLIC BLOOD PRESSURE: 64 MMHG | HEIGHT: 71 IN | HEART RATE: 94 BPM | BODY MASS INDEX: 28.14 KG/M2 | OXYGEN SATURATION: 96 % | SYSTOLIC BLOOD PRESSURE: 99 MMHG

## 2020-01-01 VITALS
TEMPERATURE: 97.5 F | SYSTOLIC BLOOD PRESSURE: 107 MMHG | DIASTOLIC BLOOD PRESSURE: 74 MMHG | BODY MASS INDEX: 27.98 KG/M2 | WEIGHT: 206.13 LBS | OXYGEN SATURATION: 98 % | HEART RATE: 82 BPM | RESPIRATION RATE: 16 BRPM

## 2020-01-01 VITALS
SYSTOLIC BLOOD PRESSURE: 92 MMHG | RESPIRATION RATE: 16 BRPM | DIASTOLIC BLOOD PRESSURE: 55 MMHG | BODY MASS INDEX: 28.44 KG/M2 | HEART RATE: 90 BPM | OXYGEN SATURATION: 98 % | WEIGHT: 203.91 LBS | TEMPERATURE: 97.3 F

## 2020-01-01 DIAGNOSIS — R53.83 OTHER FATIGUE: ICD-10-CM

## 2020-01-01 DIAGNOSIS — R06.02 SHORTNESS OF BREATH: ICD-10-CM

## 2020-01-01 DIAGNOSIS — Z01.818 ENCOUNTER FOR OTHER PREPROCEDURAL EXAMINATION: ICD-10-CM

## 2020-01-01 DIAGNOSIS — C92.00 ACUTE MYELOBLASTIC LEUKEMIA, NOT HAVING ACHIEVED REMISSION: ICD-10-CM

## 2020-01-01 DIAGNOSIS — R11.2 NAUSEA WITH VOMITING, UNSPECIFIED: ICD-10-CM

## 2020-01-01 DIAGNOSIS — Z86.72 PERSONAL HISTORY OF THROMBOPHLEBITIS: ICD-10-CM

## 2020-01-01 DIAGNOSIS — Z51.89 ENCOUNTER FOR OTHER SPECIFIED AFTERCARE: ICD-10-CM

## 2020-01-01 DIAGNOSIS — Z51.11 ENCOUNTER FOR ANTINEOPLASTIC CHEMOTHERAPY: ICD-10-CM

## 2020-01-01 DIAGNOSIS — Z94.84 STEM CELLS TRANSPLANT STATUS: ICD-10-CM

## 2020-01-01 DIAGNOSIS — F17.200 NICOTINE DEPENDENCE, UNSPECIFIED, UNCOMPLICATED: ICD-10-CM

## 2020-01-01 LAB
ALBUMIN SERPL ELPH-MCNC: 4 G/DL
ALBUMIN SERPL ELPH-MCNC: 4 G/DL
ALBUMIN SERPL ELPH-MCNC: 4.1 G/DL
ALBUMIN SERPL ELPH-MCNC: 4.1 G/DL
ALBUMIN SERPL ELPH-MCNC: 4.2 G/DL
ALBUMIN SERPL ELPH-MCNC: 4.3 G/DL
ALP BLD-CCNC: 120 U/L
ALP BLD-CCNC: 137 U/L
ALP BLD-CCNC: 138 U/L
ALP BLD-CCNC: 154 U/L
ALP BLD-CCNC: 160 U/L
ALP BLD-CCNC: 165 U/L
ALT SERPL-CCNC: 13 U/L
ALT SERPL-CCNC: 18 U/L
ALT SERPL-CCNC: 7 U/L
ALT SERPL-CCNC: 8 U/L
ALT SERPL-CCNC: 9 U/L
ALT SERPL-CCNC: 9 U/L
ANION GAP SERPL CALC-SCNC: 11 MMOL/L
ANION GAP SERPL CALC-SCNC: 12 MMOL/L
ANION GAP SERPL CALC-SCNC: 14 MMOL/L
ANION GAP SERPL CALC-SCNC: 15 MMOL/L
AST SERPL-CCNC: 14 U/L
AST SERPL-CCNC: 14 U/L
AST SERPL-CCNC: 15 U/L
AST SERPL-CCNC: 16 U/L
AST SERPL-CCNC: 24 U/L
AST SERPL-CCNC: 27 U/L
BASOPHILS # BLD AUTO: 0.02 K/UL — SIGNIFICANT CHANGE UP (ref 0–0.2)
BASOPHILS # BLD AUTO: 0.03 K/UL — SIGNIFICANT CHANGE UP (ref 0–0.2)
BASOPHILS # BLD AUTO: 0.04 K/UL — SIGNIFICANT CHANGE UP (ref 0–0.2)
BASOPHILS # BLD AUTO: 0.04 K/UL — SIGNIFICANT CHANGE UP (ref 0–0.2)
BASOPHILS # BLD AUTO: 0.05 K/UL — SIGNIFICANT CHANGE UP (ref 0–0.2)
BASOPHILS # BLD AUTO: 0.05 K/UL — SIGNIFICANT CHANGE UP (ref 0–0.2)
BASOPHILS NFR BLD AUTO: 0.2 % — SIGNIFICANT CHANGE UP (ref 0–2)
BASOPHILS NFR BLD AUTO: 0.3 % — SIGNIFICANT CHANGE UP (ref 0–2)
BASOPHILS NFR BLD AUTO: 0.3 % — SIGNIFICANT CHANGE UP (ref 0–2)
BASOPHILS NFR BLD AUTO: 0.4 % — SIGNIFICANT CHANGE UP (ref 0–2)
BASOPHILS NFR BLD AUTO: 0.5 % — SIGNIFICANT CHANGE UP (ref 0–2)
BASOPHILS NFR BLD AUTO: 0.7 % — SIGNIFICANT CHANGE UP (ref 0–2)
BILIRUB SERPL-MCNC: 0.4 MG/DL
BILIRUB SERPL-MCNC: 0.5 MG/DL
BILIRUB SERPL-MCNC: 0.6 MG/DL
BUN SERPL-MCNC: 13 MG/DL
BUN SERPL-MCNC: 15 MG/DL
BUN SERPL-MCNC: 15 MG/DL
BUN SERPL-MCNC: 16 MG/DL
BUN SERPL-MCNC: 16 MG/DL
BUN SERPL-MCNC: 17 MG/DL
CALCIUM SERPL-MCNC: 8.8 MG/DL
CALCIUM SERPL-MCNC: 8.9 MG/DL
CALCIUM SERPL-MCNC: 9 MG/DL
CALCIUM SERPL-MCNC: 9 MG/DL
CD3 AND CD33 ENRICHMENT: NORMAL
CHLORIDE SERPL-SCNC: 102 MMOL/L
CHLORIDE SERPL-SCNC: 102 MMOL/L
CHLORIDE SERPL-SCNC: 103 MMOL/L
CHLORIDE SERPL-SCNC: 104 MMOL/L
CHLORIDE SERPL-SCNC: 104 MMOL/L
CHLORIDE SERPL-SCNC: 99 MMOL/L
CMV DNA SPEC QL NAA+PROBE: NOT DETECTED IU/ML
CO2 SERPL-SCNC: 22 MMOL/L
CO2 SERPL-SCNC: 24 MMOL/L
CO2 SERPL-SCNC: 24 MMOL/L
CO2 SERPL-SCNC: 25 MMOL/L
CO2 SERPL-SCNC: 25 MMOL/L
CO2 SERPL-SCNC: 26 MMOL/L
CREAT SERPL-MCNC: 1.08 MG/DL
CREAT SERPL-MCNC: 1.15 MG/DL
CREAT SERPL-MCNC: 1.16 MG/DL
CREAT SERPL-MCNC: 1.22 MG/DL
CREAT SERPL-MCNC: 1.36 MG/DL
CREAT SERPL-MCNC: 1.37 MG/DL
ENGRAFTMNET-POST: NORMAL
EOSINOPHIL # BLD AUTO: 0.17 K/UL — SIGNIFICANT CHANGE UP (ref 0–0.5)
EOSINOPHIL # BLD AUTO: 0.19 K/UL — SIGNIFICANT CHANGE UP (ref 0–0.5)
EOSINOPHIL # BLD AUTO: 0.21 K/UL — SIGNIFICANT CHANGE UP (ref 0–0.5)
EOSINOPHIL # BLD AUTO: 0.24 K/UL — SIGNIFICANT CHANGE UP (ref 0–0.5)
EOSINOPHIL # BLD AUTO: 0.25 K/UL — SIGNIFICANT CHANGE UP (ref 0–0.5)
EOSINOPHIL # BLD AUTO: 0.3 K/UL — SIGNIFICANT CHANGE UP (ref 0–0.5)
EOSINOPHIL # BLD AUTO: 0.4 K/UL — SIGNIFICANT CHANGE UP (ref 0–0.5)
EOSINOPHIL # BLD AUTO: 0.4 K/UL — SIGNIFICANT CHANGE UP (ref 0–0.5)
EOSINOPHIL # BLD AUTO: 0.43 K/UL — SIGNIFICANT CHANGE UP (ref 0–0.5)
EOSINOPHIL # BLD AUTO: 0.54 K/UL — HIGH (ref 0–0.5)
EOSINOPHIL NFR BLD AUTO: 1.7 % — SIGNIFICANT CHANGE UP (ref 0–6)
EOSINOPHIL NFR BLD AUTO: 2 % — SIGNIFICANT CHANGE UP (ref 0–6)
EOSINOPHIL NFR BLD AUTO: 2.1 % — SIGNIFICANT CHANGE UP (ref 0–6)
EOSINOPHIL NFR BLD AUTO: 2.4 % — SIGNIFICANT CHANGE UP (ref 0–6)
EOSINOPHIL NFR BLD AUTO: 2.5 % — SIGNIFICANT CHANGE UP (ref 0–6)
EOSINOPHIL NFR BLD AUTO: 3.8 % — SIGNIFICANT CHANGE UP (ref 0–6)
EOSINOPHIL NFR BLD AUTO: 4 % — SIGNIFICANT CHANGE UP (ref 0–6)
EOSINOPHIL NFR BLD AUTO: 5 % — SIGNIFICANT CHANGE UP (ref 0–6)
EOSINOPHIL NFR BLD AUTO: 5.4 % — SIGNIFICANT CHANGE UP (ref 0–6)
EOSINOPHIL NFR BLD AUTO: 7.5 % — HIGH (ref 0–6)
GLUCOSE SERPL-MCNC: 113 MG/DL
GLUCOSE SERPL-MCNC: 118 MG/DL
GLUCOSE SERPL-MCNC: 126 MG/DL
GLUCOSE SERPL-MCNC: 127 MG/DL
GLUCOSE SERPL-MCNC: 130 MG/DL
GLUCOSE SERPL-MCNC: 140 MG/DL
HCT VFR BLD CALC: 31.3 % — LOW (ref 39–50)
HCT VFR BLD CALC: 32.6 % — LOW (ref 39–50)
HCT VFR BLD CALC: 32.8 % — LOW (ref 39–50)
HCT VFR BLD CALC: 32.9 % — LOW (ref 39–50)
HCT VFR BLD CALC: 33.4 % — LOW (ref 39–50)
HCT VFR BLD CALC: 35 % — LOW (ref 39–50)
HCT VFR BLD CALC: 37.2 % — LOW (ref 39–50)
HCT VFR BLD CALC: 37.3 % — LOW (ref 39–50)
HCT VFR BLD CALC: 37.5 % — LOW (ref 39–50)
HCT VFR BLD CALC: 37.9 % — LOW (ref 39–50)
HGB BLD-MCNC: 10.3 G/DL — LOW (ref 13–17)
HGB BLD-MCNC: 10.4 G/DL — LOW (ref 13–17)
HGB BLD-MCNC: 11 G/DL — LOW (ref 13–17)
HGB BLD-MCNC: 11.2 G/DL — LOW (ref 13–17)
HGB BLD-MCNC: 11.5 G/DL — LOW (ref 13–17)
HGB BLD-MCNC: 11.7 G/DL — LOW (ref 13–17)
HGB BLD-MCNC: 11.8 G/DL — LOW (ref 13–17)
HGB BLD-MCNC: 9.9 G/DL — LOW (ref 13–17)
IMM GRANULOCYTES NFR BLD AUTO: 0.2 % — SIGNIFICANT CHANGE UP (ref 0–1.5)
IMM GRANULOCYTES NFR BLD AUTO: 0.3 % — SIGNIFICANT CHANGE UP (ref 0–1.5)
IMM GRANULOCYTES NFR BLD AUTO: 0.4 % — SIGNIFICANT CHANGE UP (ref 0–1.5)
IMM GRANULOCYTES NFR BLD AUTO: 0.5 % — SIGNIFICANT CHANGE UP (ref 0–1.5)
IMM GRANULOCYTES NFR BLD AUTO: 0.5 % — SIGNIFICANT CHANGE UP (ref 0–1.5)
IMM GRANULOCYTES NFR BLD AUTO: 0.6 % — SIGNIFICANT CHANGE UP (ref 0–1.5)
IMM GRANULOCYTES NFR BLD AUTO: 1 % — SIGNIFICANT CHANGE UP (ref 0–1.5)
LDH SERPL-CCNC: 184 U/L
LDH SERPL-CCNC: 185 U/L
LDH SERPL-CCNC: 190 U/L
LDH SERPL-CCNC: 198 U/L
LDH SERPL-CCNC: 202 U/L
LDH SERPL-CCNC: 279 U/L
LYMPHOCYTES # BLD AUTO: 2.5 K/UL — SIGNIFICANT CHANGE UP (ref 1–3.3)
LYMPHOCYTES # BLD AUTO: 2.62 K/UL — SIGNIFICANT CHANGE UP (ref 1–3.3)
LYMPHOCYTES # BLD AUTO: 2.98 K/UL — SIGNIFICANT CHANGE UP (ref 1–3.3)
LYMPHOCYTES # BLD AUTO: 3.07 K/UL — SIGNIFICANT CHANGE UP (ref 1–3.3)
LYMPHOCYTES # BLD AUTO: 3.29 K/UL — SIGNIFICANT CHANGE UP (ref 1–3.3)
LYMPHOCYTES # BLD AUTO: 3.96 K/UL — HIGH (ref 1–3.3)
LYMPHOCYTES # BLD AUTO: 33.2 % — SIGNIFICANT CHANGE UP (ref 13–44)
LYMPHOCYTES # BLD AUTO: 34.9 % — SIGNIFICANT CHANGE UP (ref 13–44)
LYMPHOCYTES # BLD AUTO: 36.3 % — SIGNIFICANT CHANGE UP (ref 13–44)
LYMPHOCYTES # BLD AUTO: 38.1 % — SIGNIFICANT CHANGE UP (ref 13–44)
LYMPHOCYTES # BLD AUTO: 39.5 % — SIGNIFICANT CHANGE UP (ref 13–44)
LYMPHOCYTES # BLD AUTO: 4.05 K/UL — HIGH (ref 1–3.3)
LYMPHOCYTES # BLD AUTO: 4.25 K/UL — HIGH (ref 1–3.3)
LYMPHOCYTES # BLD AUTO: 4.78 K/UL — HIGH (ref 1–3.3)
LYMPHOCYTES # BLD AUTO: 41.3 % — SIGNIFICANT CHANGE UP (ref 13–44)
LYMPHOCYTES # BLD AUTO: 42.1 % — SIGNIFICANT CHANGE UP (ref 13–44)
LYMPHOCYTES # BLD AUTO: 42.2 % — SIGNIFICANT CHANGE UP (ref 13–44)
LYMPHOCYTES # BLD AUTO: 45.1 % — HIGH (ref 13–44)
LYMPHOCYTES # BLD AUTO: 45.5 % — HIGH (ref 13–44)
LYMPHOCYTES # BLD AUTO: 5.21 K/UL — HIGH (ref 1–3.3)
MAGNESIUM SERPL-MCNC: 1.8 MG/DL
MAGNESIUM SERPL-MCNC: 1.8 MG/DL
MAGNESIUM SERPL-MCNC: 1.9 MG/DL
MAGNESIUM SERPL-MCNC: 1.9 MG/DL
MAGNESIUM SERPL-MCNC: 2 MG/DL
MAGNESIUM SERPL-MCNC: 2.1 MG/DL
MCHC RBC-ENTMCNC: 30.1 G/DL — LOW (ref 32–36)
MCHC RBC-ENTMCNC: 30.8 G/DL — LOW (ref 32–36)
MCHC RBC-ENTMCNC: 30.8 G/DL — LOW (ref 32–36)
MCHC RBC-ENTMCNC: 30.9 G/DL — LOW (ref 32–36)
MCHC RBC-ENTMCNC: 31.4 G/DL — LOW (ref 32–36)
MCHC RBC-ENTMCNC: 31.4 G/DL — LOW (ref 32–36)
MCHC RBC-ENTMCNC: 31.5 G/DL — LOW (ref 32–36)
MCHC RBC-ENTMCNC: 31.6 G/DL — LOW (ref 32–36)
MCHC RBC-ENTMCNC: 32.1 PG — SIGNIFICANT CHANGE UP (ref 27–34)
MCHC RBC-ENTMCNC: 32.2 PG — SIGNIFICANT CHANGE UP (ref 27–34)
MCHC RBC-ENTMCNC: 32.3 PG — SIGNIFICANT CHANGE UP (ref 27–34)
MCHC RBC-ENTMCNC: 32.5 PG — SIGNIFICANT CHANGE UP (ref 27–34)
MCHC RBC-ENTMCNC: 32.6 PG — SIGNIFICANT CHANGE UP (ref 27–34)
MCHC RBC-ENTMCNC: 32.6 PG — SIGNIFICANT CHANGE UP (ref 27–34)
MCHC RBC-ENTMCNC: 32.8 PG — SIGNIFICANT CHANGE UP (ref 27–34)
MCHC RBC-ENTMCNC: 32.8 PG — SIGNIFICANT CHANGE UP (ref 27–34)
MCHC RBC-ENTMCNC: 33 PG — SIGNIFICANT CHANGE UP (ref 27–34)
MCHC RBC-ENTMCNC: 33 PG — SIGNIFICANT CHANGE UP (ref 27–34)
MCV RBC AUTO: 102.5 FL — HIGH (ref 80–100)
MCV RBC AUTO: 102.6 FL — HIGH (ref 80–100)
MCV RBC AUTO: 103.9 FL — HIGH (ref 80–100)
MCV RBC AUTO: 104 FL — HIGH (ref 80–100)
MCV RBC AUTO: 104.2 FL — HIGH (ref 80–100)
MCV RBC AUTO: 104.4 FL — HIGH (ref 80–100)
MCV RBC AUTO: 104.5 FL — HIGH (ref 80–100)
MCV RBC AUTO: 105.6 FL — HIGH (ref 80–100)
MCV RBC AUTO: 106.3 FL — HIGH (ref 80–100)
MCV RBC AUTO: 107.2 FL — HIGH (ref 80–100)
MONOCYTES # BLD AUTO: 0.9 K/UL — SIGNIFICANT CHANGE UP (ref 0–0.9)
MONOCYTES # BLD AUTO: 1.06 K/UL — HIGH (ref 0–0.9)
MONOCYTES # BLD AUTO: 1.09 K/UL — HIGH (ref 0–0.9)
MONOCYTES # BLD AUTO: 1.14 K/UL — HIGH (ref 0–0.9)
MONOCYTES # BLD AUTO: 1.26 K/UL — HIGH (ref 0–0.9)
MONOCYTES # BLD AUTO: 1.31 K/UL — HIGH (ref 0–0.9)
MONOCYTES # BLD AUTO: 1.32 K/UL — HIGH (ref 0–0.9)
MONOCYTES # BLD AUTO: 1.37 K/UL — HIGH (ref 0–0.9)
MONOCYTES # BLD AUTO: 1.42 K/UL — HIGH (ref 0–0.9)
MONOCYTES # BLD AUTO: 1.55 K/UL — HIGH (ref 0–0.9)
MONOCYTES NFR BLD AUTO: 10.8 % — SIGNIFICANT CHANGE UP (ref 2–14)
MONOCYTES NFR BLD AUTO: 12.3 % — SIGNIFICANT CHANGE UP (ref 2–14)
MONOCYTES NFR BLD AUTO: 12.6 % — SIGNIFICANT CHANGE UP (ref 2–14)
MONOCYTES NFR BLD AUTO: 13.1 % — SIGNIFICANT CHANGE UP (ref 2–14)
MONOCYTES NFR BLD AUTO: 13.2 % — SIGNIFICANT CHANGE UP (ref 2–14)
MONOCYTES NFR BLD AUTO: 13.5 % — SIGNIFICANT CHANGE UP (ref 2–14)
MONOCYTES NFR BLD AUTO: 13.6 % — SIGNIFICANT CHANGE UP (ref 2–14)
MONOCYTES NFR BLD AUTO: 14.8 % — HIGH (ref 2–14)
MONOCYTES NFR BLD AUTO: 16.6 % — HIGH (ref 2–14)
MONOCYTES NFR BLD AUTO: 17 % — HIGH (ref 2–14)
NEUTROPHILS # BLD AUTO: 2.43 K/UL — SIGNIFICANT CHANGE UP (ref 1.8–7.4)
NEUTROPHILS # BLD AUTO: 2.99 K/UL — SIGNIFICANT CHANGE UP (ref 1.8–7.4)
NEUTROPHILS # BLD AUTO: 3.16 K/UL — SIGNIFICANT CHANGE UP (ref 1.8–7.4)
NEUTROPHILS # BLD AUTO: 3.46 K/UL — SIGNIFICANT CHANGE UP (ref 1.8–7.4)
NEUTROPHILS # BLD AUTO: 3.97 K/UL — SIGNIFICANT CHANGE UP (ref 1.8–7.4)
NEUTROPHILS # BLD AUTO: 4.01 K/UL — SIGNIFICANT CHANGE UP (ref 1.8–7.4)
NEUTROPHILS # BLD AUTO: 4.26 K/UL — SIGNIFICANT CHANGE UP (ref 1.8–7.4)
NEUTROPHILS # BLD AUTO: 4.38 K/UL — SIGNIFICANT CHANGE UP (ref 1.8–7.4)
NEUTROPHILS # BLD AUTO: 4.45 K/UL — SIGNIFICANT CHANGE UP (ref 1.8–7.4)
NEUTROPHILS # BLD AUTO: 5.42 K/UL — SIGNIFICANT CHANGE UP (ref 1.8–7.4)
NEUTROPHILS NFR BLD AUTO: 36.7 % — LOW (ref 43–77)
NEUTROPHILS NFR BLD AUTO: 38.2 % — LOW (ref 43–77)
NEUTROPHILS NFR BLD AUTO: 39.3 % — LOW (ref 43–77)
NEUTROPHILS NFR BLD AUTO: 41.4 % — LOW (ref 43–77)
NEUTROPHILS NFR BLD AUTO: 41.7 % — LOW (ref 43–77)
NEUTROPHILS NFR BLD AUTO: 42.5 % — LOW (ref 43–77)
NEUTROPHILS NFR BLD AUTO: 43 % — SIGNIFICANT CHANGE UP (ref 43–77)
NEUTROPHILS NFR BLD AUTO: 43.9 % — SIGNIFICANT CHANGE UP (ref 43–77)
NEUTROPHILS NFR BLD AUTO: 44.1 % — SIGNIFICANT CHANGE UP (ref 43–77)
NEUTROPHILS NFR BLD AUTO: 48.4 % — SIGNIFICANT CHANGE UP (ref 43–77)
NRBC # BLD: 0 /100 WBCS — SIGNIFICANT CHANGE UP (ref 0–0)
NT-PROBNP SERPL-MCNC: ABNORMAL PG/ML
PLATELET # BLD AUTO: 43 K/UL — LOW (ref 150–400)
PLATELET # BLD AUTO: 59 K/UL — LOW (ref 150–400)
PLATELET # BLD AUTO: 60 K/UL — LOW (ref 150–400)
PLATELET # BLD AUTO: 60 K/UL — LOW (ref 150–400)
PLATELET # BLD AUTO: 61 K/UL — LOW (ref 150–400)
PLATELET # BLD AUTO: 62 K/UL — LOW (ref 150–400)
PLATELET # BLD AUTO: 62 K/UL — LOW (ref 150–400)
PLATELET # BLD AUTO: 63 K/UL — LOW (ref 150–400)
PLATELET # BLD AUTO: 67 K/UL — LOW (ref 150–400)
PLATELET # BLD AUTO: 70 K/UL — LOW (ref 150–400)
POTASSIUM SERPL-SCNC: 3.4 MMOL/L
POTASSIUM SERPL-SCNC: 3.6 MMOL/L
POTASSIUM SERPL-SCNC: 3.8 MMOL/L
POTASSIUM SERPL-SCNC: 3.8 MMOL/L
POTASSIUM SERPL-SCNC: 4 MMOL/L
POTASSIUM SERPL-SCNC: 4.1 MMOL/L
PROT SERPL-MCNC: 5.7 G/DL
PROT SERPL-MCNC: 5.8 G/DL
PROT SERPL-MCNC: 5.9 G/DL
PROT SERPL-MCNC: 6 G/DL
RBC # BLD: 3.05 M/UL — LOW (ref 4.2–5.8)
RBC # BLD: 3.12 M/UL — LOW (ref 4.2–5.8)
RBC # BLD: 3.15 M/UL — LOW (ref 4.2–5.8)
RBC # BLD: 3.2 M/UL — LOW (ref 4.2–5.8)
RBC # BLD: 3.21 M/UL — LOW (ref 4.2–5.8)
RBC # BLD: 3.37 M/UL — LOW (ref 4.2–5.8)
RBC # BLD: 3.47 M/UL — LOW (ref 4.2–5.8)
RBC # BLD: 3.51 M/UL — LOW (ref 4.2–5.8)
RBC # BLD: 3.59 M/UL — LOW (ref 4.2–5.8)
RBC # BLD: 3.6 M/UL — LOW (ref 4.2–5.8)
RBC # FLD: 19 % — HIGH (ref 10.3–14.5)
RBC # FLD: 19.3 % — HIGH (ref 10.3–14.5)
RBC # FLD: 19.4 % — HIGH (ref 10.3–14.5)
RBC # FLD: 19.5 % — HIGH (ref 10.3–14.5)
RBC # FLD: 19.7 % — HIGH (ref 10.3–14.5)
RBC # FLD: 20.9 % — HIGH (ref 10.3–14.5)
RBC # FLD: 21.5 % — HIGH (ref 10.3–14.5)
RBC # FLD: 22.3 % — HIGH (ref 10.3–14.5)
SARS-COV-2 N GENE NPH QL NAA+PROBE: NOT DETECTED
SARS-COV-2 N GENE NPH QL NAA+PROBE: NOT DETECTED
SODIUM SERPL-SCNC: 138 MMOL/L
SODIUM SERPL-SCNC: 139 MMOL/L
SODIUM SERPL-SCNC: 139 MMOL/L
SODIUM SERPL-SCNC: 140 MMOL/L
SODIUM SERPL-SCNC: 141 MMOL/L
SODIUM SERPL-SCNC: 143 MMOL/L
WBC # BLD: 10.02 K/UL — SIGNIFICANT CHANGE UP (ref 3.8–10.5)
WBC # BLD: 10.09 K/UL — SIGNIFICANT CHANGE UP (ref 3.8–10.5)
WBC # BLD: 10.5 K/UL — SIGNIFICANT CHANGE UP (ref 3.8–10.5)
WBC # BLD: 12.61 K/UL — HIGH (ref 3.8–10.5)
WBC # BLD: 6.61 K/UL — SIGNIFICANT CHANGE UP (ref 3.8–10.5)
WBC # BLD: 7.17 K/UL — SIGNIFICANT CHANGE UP (ref 3.8–10.5)
WBC # BLD: 7.89 K/UL — SIGNIFICANT CHANGE UP (ref 3.8–10.5)
WBC # BLD: 8.05 K/UL — SIGNIFICANT CHANGE UP (ref 3.8–10.5)
WBC # BLD: 9.06 K/UL — SIGNIFICANT CHANGE UP (ref 3.8–10.5)
WBC # BLD: 9.6 K/UL — SIGNIFICANT CHANGE UP (ref 3.8–10.5)
WBC # FLD AUTO: 10.02 K/UL — SIGNIFICANT CHANGE UP (ref 3.8–10.5)
WBC # FLD AUTO: 10.09 K/UL — SIGNIFICANT CHANGE UP (ref 3.8–10.5)
WBC # FLD AUTO: 10.5 K/UL — SIGNIFICANT CHANGE UP (ref 3.8–10.5)
WBC # FLD AUTO: 12.61 K/UL — HIGH (ref 3.8–10.5)
WBC # FLD AUTO: 6.61 K/UL — SIGNIFICANT CHANGE UP (ref 3.8–10.5)
WBC # FLD AUTO: 7.17 K/UL — SIGNIFICANT CHANGE UP (ref 3.8–10.5)
WBC # FLD AUTO: 7.89 K/UL — SIGNIFICANT CHANGE UP (ref 3.8–10.5)
WBC # FLD AUTO: 8.05 K/UL — SIGNIFICANT CHANGE UP (ref 3.8–10.5)
WBC # FLD AUTO: 9.06 K/UL — SIGNIFICANT CHANGE UP (ref 3.8–10.5)
WBC # FLD AUTO: 9.6 K/UL — SIGNIFICANT CHANGE UP (ref 3.8–10.5)

## 2020-01-01 PROCEDURE — 99072 ADDL SUPL MATRL&STAF TM PHE: CPT

## 2020-01-01 PROCEDURE — 99215 OFFICE O/P EST HI 40 MIN: CPT

## 2020-01-01 PROCEDURE — 71046 X-RAY EXAM CHEST 2 VIEWS: CPT | Mod: 26

## 2020-01-01 PROCEDURE — 99214 OFFICE O/P EST MOD 30 MIN: CPT

## 2020-01-01 PROCEDURE — 93356 MYOCRD STRAIN IMG SPCKL TRCK: CPT

## 2020-01-01 PROCEDURE — 86900 BLOOD TYPING SEROLOGIC ABO: CPT

## 2020-01-01 PROCEDURE — 86901 BLOOD TYPING SEROLOGIC RH(D): CPT

## 2020-01-01 PROCEDURE — 93306 TTE W/DOPPLER COMPLETE: CPT

## 2020-01-01 PROCEDURE — 71046 X-RAY EXAM CHEST 2 VIEWS: CPT

## 2020-01-01 PROCEDURE — 86850 RBC ANTIBODY SCREEN: CPT

## 2020-01-01 RX ORDER — FAMOTIDINE 20 MG/1
20 TABLET, FILM COATED ORAL TWICE DAILY
Qty: 60 | Refills: 0 | Status: DISCONTINUED | COMMUNITY
Start: 2020-08-13 | End: 2020-01-01

## 2020-01-01 RX ORDER — FLUOCINONIDE 1 MG/G
0.1 CREAM TOPICAL TWICE DAILY
Qty: 1 | Refills: 1 | Status: DISCONTINUED | COMMUNITY
Start: 2020-07-02 | End: 2020-01-01

## 2020-01-01 RX ORDER — ATOVAQUONE 750 MG/5ML
750 SUSPENSION ORAL TWICE DAILY
Qty: 300 | Refills: 5 | Status: DISCONTINUED | COMMUNITY
Start: 2019-11-07 | End: 2020-01-01

## 2020-01-01 RX ORDER — CARVEDILOL 3.12 MG/1
3.12 TABLET, FILM COATED ORAL
Qty: 180 | Refills: 0 | Status: DISCONTINUED | COMMUNITY
Start: 2020-01-01 | End: 2020-01-01

## 2020-01-01 RX ORDER — FLUTICASONE FUROATE 27.5 UG/1
27.5 SPRAY, METERED NASAL DAILY
Qty: 1 | Refills: 3 | Status: DISCONTINUED | COMMUNITY
Start: 2020-09-08 | End: 2020-01-01

## 2020-01-01 RX ORDER — FLUTICASONE PROPIONATE 50 UG/1
50 SPRAY, METERED NASAL DAILY
Qty: 1 | Refills: 1 | Status: DISCONTINUED | COMMUNITY
Start: 2020-01-01 | End: 2020-01-01

## 2020-01-01 RX ORDER — METOCLOPRAMIDE 10 MG/1
10 TABLET ORAL EVERY 6 HOURS
Qty: 120 | Refills: 0 | Status: ACTIVE | COMMUNITY
Start: 2019-11-07 | End: 1900-01-01

## 2020-01-01 RX ORDER — PREDNISONE 10 MG/1
10 TABLET ORAL
Qty: 30 | Refills: 0 | Status: DISCONTINUED | COMMUNITY
Start: 2020-05-14 | End: 2020-01-01

## 2020-01-02 ENCOUNTER — RESULT REVIEW (OUTPATIENT)
Age: 60
End: 2020-01-02

## 2020-01-02 ENCOUNTER — APPOINTMENT (OUTPATIENT)
Dept: INFUSION THERAPY | Facility: HOSPITAL | Age: 60
End: 2020-01-02

## 2020-01-02 ENCOUNTER — APPOINTMENT (OUTPATIENT)
Dept: HEMATOLOGY ONCOLOGY | Facility: CLINIC | Age: 60
End: 2020-01-02
Payer: COMMERCIAL

## 2020-01-02 VITALS
RESPIRATION RATE: 16 BRPM | WEIGHT: 185.19 LBS | DIASTOLIC BLOOD PRESSURE: 90 MMHG | SYSTOLIC BLOOD PRESSURE: 143 MMHG | OXYGEN SATURATION: 95 % | HEART RATE: 95 BPM | BODY MASS INDEX: 25.12 KG/M2 | TEMPERATURE: 98.4 F

## 2020-01-02 LAB
ALBUMIN SERPL ELPH-MCNC: 3.9 G/DL
ALP BLD-CCNC: 60 U/L
ALT SERPL-CCNC: 6 U/L
ANION GAP SERPL CALC-SCNC: 11 MMOL/L
AST SERPL-CCNC: 11 U/L
BASOPHILS # BLD AUTO: 0 K/UL — SIGNIFICANT CHANGE UP (ref 0–0.2)
BASOPHILS NFR BLD AUTO: 0.5 % — SIGNIFICANT CHANGE UP (ref 0–2)
BILIRUB SERPL-MCNC: 0.3 MG/DL
BUN SERPL-MCNC: 20 MG/DL
CALCIUM SERPL-MCNC: 9.1 MG/DL
CHLORIDE SERPL-SCNC: 105 MMOL/L
CO2 SERPL-SCNC: 23 MMOL/L
CREAT SERPL-MCNC: 1.18 MG/DL
EOSINOPHIL # BLD AUTO: 0 K/UL — SIGNIFICANT CHANGE UP (ref 0–0.5)
EOSINOPHIL NFR BLD AUTO: 0.8 % — SIGNIFICANT CHANGE UP (ref 0–6)
GLUCOSE SERPL-MCNC: 143 MG/DL
LDH SERPL-CCNC: 180 U/L
LYMPHOCYTES # BLD AUTO: 2.4 K/UL — SIGNIFICANT CHANGE UP (ref 1–3.3)
LYMPHOCYTES # BLD AUTO: 39.1 % — SIGNIFICANT CHANGE UP (ref 13–44)
MAGNESIUM SERPL-MCNC: 1.8 MG/DL
MONOCYTES # BLD AUTO: 0.3 K/UL — SIGNIFICANT CHANGE UP (ref 0–0.9)
MONOCYTES NFR BLD AUTO: 5.4 % — SIGNIFICANT CHANGE UP (ref 2–14)
NEUTROPHILS # BLD AUTO: 3.3 K/UL — SIGNIFICANT CHANGE UP (ref 1.8–7.4)
NEUTROPHILS NFR BLD AUTO: 54.2 % — SIGNIFICANT CHANGE UP (ref 43–77)
POTASSIUM SERPL-SCNC: 4.8 MMOL/L
PROT SERPL-MCNC: 5.5 G/DL
SODIUM SERPL-SCNC: 139 MMOL/L
TACROLIMUS SERPL-MCNC: 13.1 NG/ML

## 2020-01-02 PROCEDURE — 99215 OFFICE O/P EST HI 40 MIN: CPT

## 2020-01-02 NOTE — HISTORY OF PRESENT ILLNESS
[de-identified] : Mr. Delong is a 60 y/o male, with a previously diagnosed acute myeloid leukemia. A bone marrow biopsy from 7/3 revealed Acute myeloid leukemia (AML). FISH - report shows a population of aberrant myeloid blasts. He is currently being treated by Dr. Coelho for high risk AML with HIDAC consolidation chemotherapy. He was referred by Dr. Coelho for an initial consultation of a Haplo- transplant. \par \par The patient has a history of multiple blood clots - factor V Leiden runs in the family. He was diagnosed with sleep apnea, but refuses to use CPAP. He is a former smoker, 40 years. He also has a past medical history of PE, HTN, cardiomyopathy. He recently underwent an amputation of the right first toe due to an infection.\par \par S/p hospitalization at [facility] BMTU 10/3/19 - 11/1/19 for haplo (son) SCT. \par Upon admission, a TLC was placed in IR. Mr. Delong received IV fluid hydration, pain management, nutritional support, anxiolysis, antiemetics, and antiviral,  antifungal, antibacterial, GI, PCP and VOD prophylaxis. When his ANC dropped  below 1000, he was started on prophylactic Cefepime. Labs were monitored on a daily basis and he received transfusional support and electrolyte repletion as needed. \par \par While in patient, Mr. Delong was continued on his Voriconazole for history of aspergillosis. \par \par S/p hospitalization at [facility] BMTU 10/3/19 - 11/1/19 for haplo (son) SCT. \par Upon admission, a TLC was placed in IR. Mr. Delong received IV fluid hydration, pain management, nutritional support, anxiolysis, antiemetics, and antiviral, antifungal, antibacterial, GI, PCP and VOD prophylaxis. When his ANC dropped below 1000, he was started on prophylactic Cefepime. Labs were monitored on a daily basis and he received transfusional support and electrolyte repletion as needed. \par \par While in patient, Mr. Delong was continued on his Voriconazole for history of aspergillosis. \par \par During admission, Mr. Delong had pancytopenia related to the high dose chemotherapy prep regimen. He also experienced neutropenic fevers. When he became febrile, blood and urine cultures were sent and a CXR was completed which were unremarkable. \par \par On 10/9/2019, following premedications, pt received 250 ml of allogeneic, related, haplo, fresh, mobilized HPC apheresis over 30-60 minutes. \par Cell counts as follows: \par \par Total MNCs (x10^8/kg) = 5.17 \par CD 34 cells (x10^6/kg) = 7.34 \par CD 3 Cells (x 10^7/kg) = 19.81 \par Cell viability (%) = 100 \par \par Pt tolerated infusion without any adverse reaction or complications. \par \par Engraftment was noted on 10/28/2019. Daily Zarxio was discontinued, as was Cefepime given negative cultures. Mr. Delong was discharged home to f/u at the [facility].  [de-identified] : Patient presents today for a follow-up visit s/p Haplo- transplant (son) on 10/9/19. He states he is taking Metoprolol QD, Voriconazole, and Valcyte. He notes slight SOB on exertion. He notes he has been waking up later and sleeping more. Currently alternating FK 0.5mg daily and FK 0.5mg bid and taking prednisone 5mg QD. He did not take FK before blood draw today. Denies fever/chills, night sweats, mouth sores, eye dryness, blurred vision, nausea, vomiting, diarrhea. No CP or LE edema. Remains compliant with medications.

## 2020-01-02 NOTE — ADDENDUM
[FreeTextEntry1] : Documented by Rick Moore acting as a scribe for Dr. Rosina Gaytan on 01/02/2020.\par \par All medical record entries made by the Scribe were at my, Dr. Rosina Gaytan, direction and personally dictated by me on 01/02/2020. I have reviewed the chart and agree that  the record accurately reflects my personal performance of the history, physical exam, assessment and plan. I have also personally directed, reviewed, and agree with the discharge instructions.

## 2020-01-02 NOTE — REVIEW OF SYSTEMS
[Fatigue] : fatigue [Negative] : Heme/Lymph [SOB on Exertion] : shortness of breath during exertion [FreeTextEntry8] : .

## 2020-01-02 NOTE — ASSESSMENT
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 with the following comorbidities being managed:\par \par 1) AML\par S/p haplo (son) PSCT on 10/9/19\par 10/30/19 chimerism = 100% donor\par 11/14/19 chimerism = 100% donor\par 11/26/19 chimerism = 100% donor\par 12/4/19 chimerism: 100% donor\par 12/24/19 chimerism = 100% donor\par Post transplant BMBx pending\par \par 2) Heme\par Counts stable but remains pancytopenic, no indication for transfusion today.\par WBC 6.2 HGB 10.4 ANC 3.3 PLT 39\par Monitor counts closely while on Valcyte, started 11/20/19 ..decrease to QD\par Continue multivitamin and folic acid daily\par \par Hx of recurrent DVT/PE\par Factor V Leiden reportedly runs in the family \par 11/21/19 Doppler US of RUE negative for DVT\par Xarelto on hold while pancytopenic, to resume when PLT consistently >100K\par \par 3) ID\par Continue ppx:\par - Mepron 750 mg bid\par - Voriconazole 200 mg bid - hx of aspergillosis \par Post transplant crowd and dietary restrictions reviewed\par \par CMV viremia\par 11/7/19 CMV PCR = 247\par 11/14/19 CMV PCR = 2,205\par 11/20/19 CMV PCR = 8,581\par 11/26/19 CMV PCR = 4103\par 12/4/19 CMV PCR = 215\par 12/10/19 CMV PCR= not detected\par 12/24/19 CMV PCR = not detected\par Valcyte 450mg BID started 11/21/19. Decrease to QD as of 12/17/19.\par Continue to monitor PCR weekly \par \par 4) GVHD\par Skin 0 Liver 0 GI 0 - overall grade 0\par No signs/symptoms of acute or chronic GVHD at this time\par Continue alternating FK 0.5mg daily and FK 0.5mg bid\par Off MMF as of 11/22/19 \par Reviewed signs/symptoms of GVHD (i.e. rash, mucositis, nausea/vomiting, diarrhea)\par \par 5) GI\par Continue ppx:\par - Protonix 40 mg daily\par - Ursodiol 300 mg bid\par PRN Zofran and Reglan for nausea/vomiting\par \par 6) Failure to thrive\par Poor PO intake/hydration with resulting hypotension\par Prednisone 20 mg daily started 11/20/19....Prednisone lowered to 15mg daily.....Prednisone 10mg QD. Continue Prednisone 5mg QD. Cont slow taper next week\par Decrease IV fluid to once a week until PO intake improves, 750mL/2hrs given cardiomyopathy - cancelling IV fluid for 12/17/19, 12/24/19, and 1/2/20.\par Encouraged to hydrate adequately and eat small frequent meals/snacks\par \par 7) Other\par HTN - enalapril 2.5mg daily and metoprolol succinate 12.5mg daily on hold in setting of hypotension and FTT. Back on metoprolol daily - increase to BID as of 1/2/20\par BPH - continue finasteride 5mg daily and tamsulosin 0.4mg daily \par Hypomagnesemia - likely 2/2 FK, continue MgOx 400mg tid\par Anxiety/Depression - continue Zoloft 50mg daily and PRN Xanax 0.5mg qhs\par \par 8) Plan/Dispo\par Pt educated regarding plan of care, all questions/concerns addressed\par Peripheral blood work reviewed and discussed with patient.\par Labs sent for CMP, LDH, Mg, CD3 and CD33 Enrichment, Engraftment, Tacrolimus Whole Blood, CMV by PCR.\par Increase Metoprolol to BID as of 1/2/20.\par Continue Prednisone 5mg QD.\par Continue alternating FK 0.5mg BID and 0.5 QD.\par Renewed Tamsulosin on 1/2/20.\par Decrease Valcyte 450mg to QD as of 12/17/19.\par Advised against travel at this time.\par Maintain weekly appointments at this time.\par RTC for f/u with YADIRA Arias on 1/8, 1/17, 1/21, and 1/31. RTC for f/u with Dr. Gaytan on 2/5.

## 2020-01-02 NOTE — PHYSICAL EXAM
[Ambulatory and capable of all self care but unable to carry out any work activities] : Status 2- Ambulatory and capable of all self care but unable to carry out any work activities. Up and about more than 50% of waking hours [Normal] : grossly intact [de-identified] : s/p right first toe amputation.

## 2020-01-06 ENCOUNTER — APPOINTMENT (OUTPATIENT)
Dept: CARDIOLOGY | Facility: CLINIC | Age: 60
End: 2020-01-06
Payer: COMMERCIAL

## 2020-01-06 ENCOUNTER — NON-APPOINTMENT (OUTPATIENT)
Age: 60
End: 2020-01-06

## 2020-01-06 VITALS
DIASTOLIC BLOOD PRESSURE: 87 MMHG | BODY MASS INDEX: 24.92 KG/M2 | HEART RATE: 84 BPM | SYSTOLIC BLOOD PRESSURE: 123 MMHG | WEIGHT: 184 LBS | OXYGEN SATURATION: 93 % | HEIGHT: 72 IN

## 2020-01-06 LAB
CMV DNA SPEC QL NAA+PROBE: NOT DETECTED
CMVPCR LOG: NOT DETECTED LOG10IU/ML

## 2020-01-06 PROCEDURE — 93000 ELECTROCARDIOGRAM COMPLETE: CPT

## 2020-01-06 PROCEDURE — 99214 OFFICE O/P EST MOD 30 MIN: CPT

## 2020-01-08 ENCOUNTER — RESULT REVIEW (OUTPATIENT)
Age: 60
End: 2020-01-08

## 2020-01-08 ENCOUNTER — APPOINTMENT (OUTPATIENT)
Dept: HEMATOLOGY ONCOLOGY | Facility: CLINIC | Age: 60
End: 2020-01-08
Payer: COMMERCIAL

## 2020-01-08 VITALS
SYSTOLIC BLOOD PRESSURE: 115 MMHG | OXYGEN SATURATION: 95 % | TEMPERATURE: 97.5 F | HEART RATE: 86 BPM | WEIGHT: 185.63 LBS | BODY MASS INDEX: 25.18 KG/M2 | DIASTOLIC BLOOD PRESSURE: 74 MMHG | RESPIRATION RATE: 16 BRPM

## 2020-01-08 DIAGNOSIS — Z86.69 PERSONAL HISTORY OF OTHER DISEASES OF THE NERVOUS SYSTEM AND SENSE ORGANS: ICD-10-CM

## 2020-01-08 LAB
BASOPHILS # BLD AUTO: 0.1 K/UL — SIGNIFICANT CHANGE UP (ref 0–0.2)
BASOPHILS NFR BLD AUTO: 0.9 % — SIGNIFICANT CHANGE UP (ref 0–2)
EOSINOPHIL # BLD AUTO: 0.1 K/UL — SIGNIFICANT CHANGE UP (ref 0–0.5)
EOSINOPHIL NFR BLD AUTO: 1.1 % — SIGNIFICANT CHANGE UP (ref 0–6)
HCT VFR BLD CALC: 30.2 % — LOW (ref 39–50)
HGB BLD-MCNC: 10.3 G/DL — LOW (ref 13–17)
LYMPHOCYTES # BLD AUTO: 2.1 K/UL — SIGNIFICANT CHANGE UP (ref 1–3.3)
LYMPHOCYTES # BLD AUTO: 35.8 % — SIGNIFICANT CHANGE UP (ref 13–44)
MCHC RBC-ENTMCNC: 34.1 G/DL — SIGNIFICANT CHANGE UP (ref 32–36)
MCHC RBC-ENTMCNC: 35.7 PG — HIGH (ref 27–34)
MCV RBC AUTO: 104 FL — HIGH (ref 80–100)
MONOCYTES # BLD AUTO: 0.2 K/UL — SIGNIFICANT CHANGE UP (ref 0–0.9)
MONOCYTES NFR BLD AUTO: 3.6 % — SIGNIFICANT CHANGE UP (ref 2–14)
NEUTROPHILS # BLD AUTO: 3.5 K/UL — SIGNIFICANT CHANGE UP (ref 1.8–7.4)
NEUTROPHILS NFR BLD AUTO: 58.6 % — SIGNIFICANT CHANGE UP (ref 43–77)
PLATELET # BLD AUTO: 32 K/UL — LOW (ref 150–400)
RBC # BLD: 2.89 M/UL — LOW (ref 4.2–5.8)
RBC # FLD: 18.3 % — HIGH (ref 10.3–14.5)
WBC # BLD: 5.9 K/UL — SIGNIFICANT CHANGE UP (ref 3.8–10.5)
WBC # FLD AUTO: 5.9 K/UL — SIGNIFICANT CHANGE UP (ref 3.8–10.5)

## 2020-01-08 PROCEDURE — 99214 OFFICE O/P EST MOD 30 MIN: CPT

## 2020-01-09 LAB
ALBUMIN SERPL ELPH-MCNC: 4.1 G/DL
ALP BLD-CCNC: 66 U/L
ALT SERPL-CCNC: 5 U/L
ANION GAP SERPL CALC-SCNC: 12 MMOL/L
AST SERPL-CCNC: 14 U/L
BILIRUB SERPL-MCNC: 0.3 MG/DL
BUN SERPL-MCNC: 17 MG/DL
CALCIUM SERPL-MCNC: 9.1 MG/DL
CD3 AND CD33 ENRICHMENT: NORMAL
CHLORIDE SERPL-SCNC: 105 MMOL/L
CMV DNA SPEC QL NAA+PROBE: NOT DETECTED
CMVPCR LOG: NOT DETECTED LOG10IU/ML
CO2 SERPL-SCNC: 23 MMOL/L
CREAT SERPL-MCNC: 1.14 MG/DL
ENGRAFTMNET-POST: NORMAL
GLUCOSE SERPL-MCNC: 134 MG/DL
LDH SERPL-CCNC: 218 U/L
MAGNESIUM SERPL-MCNC: 1.9 MG/DL
POTASSIUM SERPL-SCNC: 5 MMOL/L
PROT SERPL-MCNC: 5.7 G/DL
SODIUM SERPL-SCNC: 140 MMOL/L
TACROLIMUS SERPL-MCNC: 9.8 NG/ML

## 2020-01-10 NOTE — PHYSICAL EXAM
[Ambulatory and capable of all self care but unable to carry out any work activities] : Status 2- Ambulatory and capable of all self care but unable to carry out any work activities. Up and about more than 50% of waking hours [Normal] : affect appropriate [de-identified] : right eye conjunctivitis with clear watery drainage; anicteric bilaterally [de-identified] : no edema [de-identified] : +BS; abdomen soft, non-distended, non-tender [de-identified] : s/p right first toe amputation.

## 2020-01-10 NOTE — REVIEW OF SYSTEMS
[Fatigue] : fatigue [Red Eyes] : red eyes [SOB on Exertion] : shortness of breath during exertion [Negative] : Heme/Lymph [Fever] : no fever [Eye Pain] : no eye pain [Vision Problems] : no vision problems [Dysphagia] : no dysphagia [Nosebleeds] : no nosebleeds [Odynophagia] : no odynophagia [Mucosal Pain] : no mucosal pain [Chest Pain] : no chest pain [Lower Ext Edema] : no lower extremity edema [Cough] : no cough [Abdominal Pain] : no abdominal pain [Vomiting] : no vomiting [Constipation] : no constipation [Diarrhea] : no diarrhea [Dysuria] : no dysuria [Joint Pain] : no joint pain [Muscle Pain] : no muscle pain [Dizziness] : no dizziness [Fainting] : no fainting [FreeTextEntry3] : right eye itching, redness, and watering [FreeTextEntry8] : .

## 2020-01-10 NOTE — HISTORY OF PRESENT ILLNESS
[de-identified] : Mr. Delong is a 60 y/o male, with a previously diagnosed acute myeloid leukemia. A bone marrow biopsy from 7/3 revealed Acute myeloid leukemia (AML). FISH - report shows a population of aberrant myeloid blasts. He is currently being treated by Dr. Coelho for high risk AML with HIDAC consolidation chemotherapy. He was referred by Dr. Coelho for an initial consultation of a Haplo- transplant. \par \par The patient has a history of multiple blood clots - factor V Leiden runs in the family. He was diagnosed with sleep apnea, but refuses to use CPAP. He is a former smoker, 40 years. He also has a past medical history of PE, HTN, cardiomyopathy. He recently underwent an amputation of the right first toe due to an infection.\par \par S/p hospitalization at [facility] BMTU 10/3/19 - 11/1/19 for haplo (son) SCT. \par Upon admission, a TLC was placed in IR. Mr. Delong received IV fluid hydration, pain management, nutritional support, anxiolysis, antiemetics, and antiviral,  antifungal, antibacterial, GI, PCP and VOD prophylaxis. When his ANC dropped  below 1000, he was started on prophylactic Cefepime. Labs were monitored on a daily basis and he received transfusional support and electrolyte repletion as needed. \par \par While in patient, Mr. Delong was continued on his Voriconazole for history of aspergillosis. \par \par S/p hospitalization at [facility] BMTU 10/3/19 - 11/1/19 for haplo (son) SCT. \par Upon admission, a TLC was placed in IR. Mr. Delong received IV fluid hydration, pain management, nutritional support, anxiolysis, antiemetics, and antiviral, antifungal, antibacterial, GI, PCP and VOD prophylaxis. When his ANC dropped below 1000, he was started on prophylactic Cefepime. Labs were monitored on a daily basis and he received transfusional support and electrolyte repletion as needed. \par \par While in patient, Mr. Delong was continued on his Voriconazole for history of aspergillosis. \par \par During admission, Mr. Delong had pancytopenia related to the high dose chemotherapy prep regimen. He also experienced neutropenic fevers. When he became febrile, blood and urine cultures were sent and a CXR was completed which were unremarkable. \par \par On 10/9/2019, following premedications, pt received 250 ml of allogeneic, related, haplo, fresh, mobilized HPC apheresis over 30-60 minutes. \par Cell counts as follows: \par \par Total MNCs (x10^8/kg) = 5.17 \par CD 34 cells (x10^6/kg) = 7.34 \par CD 3 Cells (x 10^7/kg) = 19.81 \par Cell viability (%) = 100 \par \par Pt tolerated infusion without any adverse reaction or complications. \par \par Engraftment was noted on 10/28/2019. Daily Zarxio was discontinued, as was Cefepime given negative cultures. Mr. Delong was discharged home to f/u at the [facility].  [de-identified] : On 1/8/20 visit, day +81 of SCT today. Overall continues to feel well. Stable SOB with exertion that resolves with rest. Reports adequate PO intake and hydration. Only complaint is new onset right eye itching, redness, and watering. Denies eye pain and blurred vision. Compliant with post transplant meds and restrictions. Currently alternating FK 0.5mg bid and 0.5mg daily which he did not take prior to lab draw today. Denies fever, chills, headache, dizziness, blurred vision, mucositis/odynophagia, chest pain, cough, nausea/vomiting, diarrhea/constipation, abdominal pain, dysuria, LE edema, rash, bleeding, pain

## 2020-01-10 NOTE — ASSESSMENT
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 with the following comorbidities being managed:\par \par 1) AML\par S/p haplo (son) PSCT on 10/9/19\par 10/30/19 chimerism = 100% donor\par 11/14/19 chimerism = 100% donor\par 11/26/19 chimerism = 100% donor\par 12/4/19 chimerism: 100% donor\par 12/24/19 chimerism = 100% donor (CD33 = 100% and CD3 = 99.3% donor)\par Post transplant BMBx pending\par \par 2) Heme\par ABO compatible transplant: pt and donor are both A pos\par Thrombocytopenia but counts otherwise stable, poss 2/2 Valcyte, no indication for transfusion today\par    1/8/20:   WBC 5.9   ANC 3.5   Hgb 10.3   PLT 32\par Continue multivitamin and folic acid daily\par \par Hx of recurrent DVT/PE\par Factor V Leiden reportedly runs in the family \par 11/21/19 Doppler US of RUE negative for DVT\par Xarelto on hold while pancytopenic, to resume when PLT consistently >100K\par \par 3) ID\par Continue ppx:\par - Mepron 750 mg bid\par - Voriconazole 200 mg bid - hx of aspergillosis \par Post transplant crowd and dietary restrictions reviewed\par \par CMV viremia\par 11/7/19 CMV PCR = 247\par 11/14/19 CMV PCR = 2,205\par 11/20/19 CMV PCR = 8,581\par 11/26/19 CMV PCR = 4103\par 12/4/19 CMV PCR = 215\par 12/10/19 CMV PCR= not detected\par 12/24/19 CMV PCR = not detected\par 1/2/20 CMV PCR not detected\par Valcyte 450mg BID started 11/21/19. Decrease to QD as of 12/17/19\par Continue to monitor PCR weekly \par \par Conjunctivitis\par Redness, itching, and tearing of right eye noted 1/8/20\par Polymyxin eye gtts x 5-7 days started 1/8/20\par Continue to monitor closely\par \par 4) GVHD\par Skin 0   Liver 0   GI 0 - overall grade 0\par No signs/symptoms of acute or chronic GVHD at this time\par Continue alternating FK 0.5mg daily and FK 0.5mg bid - pending today's level\par Off MMF as of 11/22/19 \par Reviewed signs/symptoms of GVHD (i.e. rash, mucositis, nausea/vomiting, diarrhea)\par \par 5) GI\par Continue ppx:\par - Protonix 40 mg daily\par - Ursodiol 300 mg bid\par PRN Zofran and Reglan for nausea/vomiting\par \par 6) Failure to thrive\par Poor PO intake/hydration with resulting hypotension\par Prednisone 20 mg daily started 11/20/19 - decreased to prednisone 2.5mg daily on 1/8/20 \par Encouraged to hydrate adequately and eat small frequent meals/snacks\par \par 7) Other\par HTN - continue metoprolol succinate 12.5mg bid. Continue to hold enalapril 2.5mg daily in light of recent hypotension\par BPH - continue finasteride 5mg daily and tamsulosin 0.4mg daily \par Hypomagnesemia - likely 2/2 FK, continue MgOx 400mg tid\par Anxiety/Depression - continue Zoloft 50mg daily and PRN Xanax 0.5mg qhs\par \par 8) Plan/Dispo\par Pt educated regarding plan of care, all questions/concerns addressed\par Continue weekly appts at this time\par F/u with NP on 1/17/20

## 2020-01-17 ENCOUNTER — APPOINTMENT (OUTPATIENT)
Dept: HEMATOLOGY ONCOLOGY | Facility: CLINIC | Age: 60
End: 2020-01-17
Payer: COMMERCIAL

## 2020-01-17 ENCOUNTER — RESULT REVIEW (OUTPATIENT)
Age: 60
End: 2020-01-17

## 2020-01-17 VITALS
BODY MASS INDEX: 25.86 KG/M2 | SYSTOLIC BLOOD PRESSURE: 119 MMHG | RESPIRATION RATE: 18 BRPM | HEART RATE: 91 BPM | DIASTOLIC BLOOD PRESSURE: 65 MMHG | WEIGHT: 190.7 LBS | TEMPERATURE: 97.9 F | OXYGEN SATURATION: 94 %

## 2020-01-17 LAB
ALBUMIN SERPL ELPH-MCNC: 4.1 G/DL
ALP BLD-CCNC: 72 U/L
ALT SERPL-CCNC: 7 U/L
ANION GAP SERPL CALC-SCNC: 12 MMOL/L
AST SERPL-CCNC: 14 U/L
BASOPHILS # BLD AUTO: 0.1 K/UL — SIGNIFICANT CHANGE UP (ref 0–0.2)
BASOPHILS NFR BLD AUTO: 1 % — SIGNIFICANT CHANGE UP (ref 0–2)
BILIRUB SERPL-MCNC: 0.4 MG/DL
BUN SERPL-MCNC: 18 MG/DL
CALCIUM SERPL-MCNC: 8.9 MG/DL
CD3 AND CD33 ENRICHMENT: NORMAL
CHLORIDE SERPL-SCNC: 104 MMOL/L
CO2 SERPL-SCNC: 22 MMOL/L
CREAT SERPL-MCNC: 1.19 MG/DL
ENGRAFTMNET-POST: NORMAL
EOSINOPHIL # BLD AUTO: 0.2 K/UL — SIGNIFICANT CHANGE UP (ref 0–0.5)
EOSINOPHIL NFR BLD AUTO: 5 % — SIGNIFICANT CHANGE UP (ref 0–6)
GLUCOSE SERPL-MCNC: 141 MG/DL
HCT VFR BLD CALC: 30.6 % — LOW (ref 39–50)
HGB BLD-MCNC: 10.6 G/DL — LOW (ref 13–17)
LDH SERPL-CCNC: 237 U/L
LYMPHOCYTES # BLD AUTO: 2.4 K/UL — SIGNIFICANT CHANGE UP (ref 1–3.3)
LYMPHOCYTES # BLD AUTO: 30 % — SIGNIFICANT CHANGE UP (ref 13–44)
MAGNESIUM SERPL-MCNC: 1.9 MG/DL
MCHC RBC-ENTMCNC: 34.6 G/DL — SIGNIFICANT CHANGE UP (ref 32–36)
MCHC RBC-ENTMCNC: 36.6 PG — HIGH (ref 27–34)
MCV RBC AUTO: 106 FL — HIGH (ref 80–100)
MONOCYTES # BLD AUTO: 0.5 K/UL — SIGNIFICANT CHANGE UP (ref 0–0.9)
MONOCYTES NFR BLD AUTO: 9 % — SIGNIFICANT CHANGE UP (ref 2–14)
NEUTROPHILS # BLD AUTO: 3.5 K/UL — SIGNIFICANT CHANGE UP (ref 1.8–7.4)
NEUTROPHILS NFR BLD AUTO: 52 % — SIGNIFICANT CHANGE UP (ref 43–77)
NEUTS BAND # BLD: 3 % — SIGNIFICANT CHANGE UP (ref 0–8)
PLAT MORPH BLD: NORMAL — SIGNIFICANT CHANGE UP
PLATELET # BLD AUTO: 33 K/UL — LOW (ref 150–400)
POTASSIUM SERPL-SCNC: 4.8 MMOL/L
PROT SERPL-MCNC: 5.9 G/DL
RBC # BLD: 2.89 M/UL — LOW (ref 4.2–5.8)
RBC # FLD: 17.4 % — HIGH (ref 10.3–14.5)
RBC BLD AUTO: SIGNIFICANT CHANGE UP
SODIUM SERPL-SCNC: 138 MMOL/L
TACROLIMUS SERPL-MCNC: 16.3 NG/ML
WBC # BLD: 6.6 K/UL — SIGNIFICANT CHANGE UP (ref 3.8–10.5)
WBC # FLD AUTO: 6.6 K/UL — SIGNIFICANT CHANGE UP (ref 3.8–10.5)

## 2020-01-17 PROCEDURE — 99214 OFFICE O/P EST MOD 30 MIN: CPT

## 2020-01-17 RX ORDER — POLYMYXIN B SULFATE AND TRIMETHOPRIM 10000; 1 [USP'U]/ML; MG/ML
10000-0.1 SOLUTION OPHTHALMIC
Qty: 1 | Refills: 0 | Status: DISCONTINUED | COMMUNITY
Start: 2020-01-08 | End: 2020-01-17

## 2020-01-20 LAB
CMV DNA SPEC QL NAA+PROBE: NOT DETECTED
CMVPCR LOG: NOT DETECTED LOG10IU/ML

## 2020-01-21 ENCOUNTER — RESULT REVIEW (OUTPATIENT)
Age: 60
End: 2020-01-21

## 2020-01-21 ENCOUNTER — LABORATORY RESULT (OUTPATIENT)
Age: 60
End: 2020-01-21

## 2020-01-21 ENCOUNTER — APPOINTMENT (OUTPATIENT)
Dept: HEMATOLOGY ONCOLOGY | Facility: CLINIC | Age: 60
End: 2020-01-21
Payer: COMMERCIAL

## 2020-01-21 ENCOUNTER — OUTPATIENT (OUTPATIENT)
Dept: OUTPATIENT SERVICES | Facility: HOSPITAL | Age: 60
LOS: 1 days | End: 2020-01-21

## 2020-01-21 VITALS
RESPIRATION RATE: 18 BRPM | OXYGEN SATURATION: 98 % | DIASTOLIC BLOOD PRESSURE: 78 MMHG | TEMPERATURE: 97.9 F | BODY MASS INDEX: 25.86 KG/M2 | SYSTOLIC BLOOD PRESSURE: 117 MMHG | WEIGHT: 190.7 LBS | HEART RATE: 93 BPM

## 2020-01-21 DIAGNOSIS — C92.00 ACUTE MYELOBLASTIC LEUKEMIA, NOT HAVING ACHIEVED REMISSION: ICD-10-CM

## 2020-01-21 LAB
EOSINOPHIL # BLD AUTO: 0.2 K/UL — SIGNIFICANT CHANGE UP (ref 0–0.5)
EOSINOPHIL NFR BLD AUTO: 4 % — SIGNIFICANT CHANGE UP (ref 0–6)
HCT VFR BLD CALC: 32 % — LOW (ref 39–50)
HGB BLD-MCNC: 10.9 G/DL — LOW (ref 13–17)
LYMPHOCYTES # BLD AUTO: 2.2 K/UL — SIGNIFICANT CHANGE UP (ref 1–3.3)
LYMPHOCYTES # BLD AUTO: 35 % — SIGNIFICANT CHANGE UP (ref 13–44)
MCHC RBC-ENTMCNC: 34.1 G/DL — SIGNIFICANT CHANGE UP (ref 32–36)
MCHC RBC-ENTMCNC: 36.2 PG — HIGH (ref 27–34)
MCV RBC AUTO: 106 FL — HIGH (ref 80–100)
MONOCYTES # BLD AUTO: 0.2 K/UL — SIGNIFICANT CHANGE UP (ref 0–0.9)
MONOCYTES NFR BLD AUTO: 9 % — SIGNIFICANT CHANGE UP (ref 2–14)
NEUTROPHILS # BLD AUTO: 2.6 K/UL — SIGNIFICANT CHANGE UP (ref 1.8–7.4)
NEUTROPHILS NFR BLD AUTO: 52 % — SIGNIFICANT CHANGE UP (ref 43–77)
PLAT MORPH BLD: NORMAL — SIGNIFICANT CHANGE UP
PLATELET # BLD AUTO: 38 K/UL — LOW (ref 150–400)
RBC # BLD: 3.01 M/UL — LOW (ref 4.2–5.8)
RBC # FLD: 16.7 % — HIGH (ref 10.3–14.5)
RBC BLD AUTO: SIGNIFICANT CHANGE UP
WBC # BLD: 5.2 K/UL — SIGNIFICANT CHANGE UP (ref 3.8–10.5)
WBC # FLD AUTO: 5.2 K/UL — SIGNIFICANT CHANGE UP (ref 3.8–10.5)

## 2020-01-21 PROCEDURE — 99214 OFFICE O/P EST MOD 30 MIN: CPT

## 2020-01-21 NOTE — HISTORY OF PRESENT ILLNESS
[de-identified] : Mr. Delong is a 60 y/o male, with a previously diagnosed acute myeloid leukemia. A bone marrow biopsy from 7/3 revealed Acute myeloid leukemia (AML). FISH - report shows a population of aberrant myeloid blasts. He is currently being treated by Dr. Coelho for high risk AML with HIDAC consolidation chemotherapy. He was referred by Dr. Coelho for an initial consultation of a Haplo- transplant. \par \par The patient has a history of multiple blood clots - factor V Leiden runs in the family. He was diagnosed with sleep apnea, but refuses to use CPAP. He is a former smoker, 40 years. He also has a past medical history of PE, HTN, cardiomyopathy. He recently underwent an amputation of the right first toe due to an infection.\par \par S/p hospitalization at [facility] BMTU 10/3/19 - 11/1/19 for haplo (son) SCT. \par Upon admission, a TLC was placed in IR. Mr. Delong received IV fluid hydration, pain management, nutritional support, anxiolysis, antiemetics, and antiviral,  antifungal, antibacterial, GI, PCP and VOD prophylaxis. When his ANC dropped  below 1000, he was started on prophylactic Cefepime. Labs were monitored on a daily basis and he received transfusional support and electrolyte repletion as needed. \par \par While in patient, Mr. Delong was continued on his Voriconazole for history of aspergillosis. \par \par S/p hospitalization at [facility] BMTU 10/3/19 - 11/1/19 for haplo (son) SCT. \par Upon admission, a TLC was placed in IR. Mr. Delong received IV fluid hydration, pain management, nutritional support, anxiolysis, antiemetics, and antiviral, antifungal, antibacterial, GI, PCP and VOD prophylaxis. When his ANC dropped below 1000, he was started on prophylactic Cefepime. Labs were monitored on a daily basis and he received transfusional support and electrolyte repletion as needed. \par \par While in patient, Mr. Delong was continued on his Voriconazole for history of aspergillosis. \par \par During admission, Mr. Delong had pancytopenia related to the high dose chemotherapy prep regimen. He also experienced neutropenic fevers. When he became febrile, blood and urine cultures were sent and a CXR was completed which were unremarkable. \par \par On 10/9/2019, following premedications, pt received 250 ml of allogeneic, related, haplo, fresh, mobilized HPC apheresis over 30-60 minutes. \par Cell counts as follows: \par \par Total MNCs (x10^8/kg) = 5.17 \par CD 34 cells (x10^6/kg) = 7.34 \par CD 3 Cells (x 10^7/kg) = 19.81 \par Cell viability (%) = 100 \par \par Pt tolerated infusion without any adverse reaction or complications. \par \par Engraftment was noted on 10/28/2019. Daily Zarxio was discontinued, as was Cefepime given negative cultures. Mr. Delong was discharged home to f/u at the [facility].  [de-identified] : On 1/17/20 visit, day +100 of SCT today. Ongoing fatigue and SOB with exertion. Reports adequate PO intake and hydration. Notes pruritic rash on face and neck. Ongoing insomnia, requesting a refill on Xanax today. Compliant with post transplant meds and restrictions. Currently alternating FK 0.5mg bid and FK 0.5mg daily, which he did not take prior to lab draw today. Denies fever, chills, headache, dizziness, blurred vision, mucositis/odynophagia, chest pain, cough, nausea/vomiting, diarrhea/constipation, abdominal pain, dysuria, LE edema, bleeding, pain

## 2020-01-21 NOTE — REVIEW OF SYSTEMS
[Fever] : no fever [Fatigue] : fatigue [Eye Pain] : no eye pain [Dry Eyes] : no dryness of the eyes [Vision Problems] : no vision problems [Dysphagia] : no dysphagia [Nosebleeds] : no nosebleeds [Odynophagia] : no odynophagia [Mucosal Pain] : no mucosal pain [Chest Pain] : no chest pain [Lower Ext Edema] : no lower extremity edema [Cough] : no cough [SOB on Exertion] : shortness of breath during exertion [Abdominal Pain] : no abdominal pain [Vomiting] : no vomiting [Constipation] : no constipation [Diarrhea] : no diarrhea [Dysuria] : no dysuria [Muscle Pain] : no muscle pain [Joint Pain] : no joint pain [Skin Rash] : skin rash [Dizziness] : no dizziness [Fainting] : no fainting [Insomnia] : insomnia [Negative] : Heme/Lymph [FreeTextEntry8] : . [de-identified] : pruritic rash of face and neck

## 2020-01-21 NOTE — REASON FOR VISIT
[Follow-Up Visit] : a follow-up visit for [Other: _____] : [unfilled] [Acute Myeloid Leukemia] : acute myeloid leukemia [FreeTextEntry2] : S/p haplo (son) PBSCT on 10/9/19

## 2020-01-21 NOTE — ASSESSMENT
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 with the following comorbidities being managed:\par \par 1) AML\par S/p haplo (son) PSCT on 10/9/19\par 10/30/19 chimerism = 100% donor\par 11/14/19 chimerism = 100% donor\par 11/26/19 chimerism = 100% donor\par 12/4/19 chimerism: 100% donor\par 12/24/19 chimerism = 100% donor (CD33 = 100% and CD3 = 99.3% donor)\par 1/8/20: chimerism = 100% donor\par Post transplant BMBx pending approx day +120-150\par \par 2) Heme\par ABO compatible transplant: pt and donor are both A pos\par Ongoing anemia and thrombocytopenia but counts otherwise stable, poss 2/2 Valcyte, no indication for transfusion today\par    1/17/20:   WBC 6.6   ANC 3.5   Hgb 10.6   PLT 33\par Continue multivitamin and folic acid daily\par \par Hx of recurrent DVT/PE\par Factor V Leiden reportedly runs in the family \par 11/21/19 Doppler US of RUE negative for DVT\par Xarelto on hold while pancytopenic, to resume when PLT consistently >100K\par \par 3) ID\par Continue ppx:\par - Mepron 750mg bid\par - Voriconazole 200mg bid - hx of aspergillosis \par Post transplant crowd and dietary restrictions reviewed\par \par CMV viremia\par 11/7/19 CMV PCR = 247\par 11/14/19 CMV PCR = 2,205\par 11/20/19 CMV PCR = 8,581\par 11/26/19 CMV PCR = 4103\par 12/4/19 CMV PCR = 215\par 12/10/19 CMV PCR= not detected\par 12/24/19 CMV PCR = not detected\par 1/2/20 CMV PCR not detected\par 1/8/20 CMV PCR not detected\par Valcyte 450mg BID started 11/21/19. Decrease to QD as of 12/17/19\par Continue to monitor PCR weekly \par \par 4) GVHD\par Skin 0   Liver 0   GI 0 - overall grade 0\par No signs/symptoms of acute or chronic GVHD at this time\par Continue alternating FK 0.5mg daily and FK 0.5mg bid - pending today's level\par Off MMF as of 11/22/19 \par Reviewed signs/symptoms of GVHD (i.e. rash, mucositis, nausea/vomiting, diarrhea)\par \par Pruritic rash of face and neck\par ?GVHD vs dermatitis\par Apply hydrocortisone cream to affected areas bid\par Continue to monitor closely\par \par 5) GI\par Continue ppx:\par - Protonix 40 mg daily\par - Ursodiol 300 mg bid\par PRN Zofran and Reglan for nausea/vomiting\par \par 6) Failure to thrive\par Poor PO intake/hydration with resulting hypotension\par Prednisone 20 mg daily started 11/20/19 - decreased to prednisone 2.5mg daily on 1/8/20 \par \par 7) Other\par HTN - continue metoprolol succinate 12.5mg bid. Continue to hold enalapril 2.5mg daily in light of recent hypotension\par BPH - continue finasteride 5mg daily and tamsulosin 0.4mg daily \par Hypomagnesemia - likely 2/2 FK, continue MgOx 400mg tid\par Anxiety/Depression - continue Zoloft 50mg daily and PRN Xanax 0.5mg qhs\par \par 8) Plan/Dispo\par Pt educated regarding plan of care, all questions/concerns addressed\par Continue weekly appts at this time\par F/u with NP on 1/21/20

## 2020-01-21 NOTE — PHYSICAL EXAM
[Ambulatory and capable of all self care but unable to carry out any work activities] : Status 2- Ambulatory and capable of all self care but unable to carry out any work activities. Up and about more than 50% of waking hours [Normal] : affect appropriate [de-identified] : no edema [de-identified] : +BS; abdomen soft, non-distended, non-tender [de-identified] : s/p right first toe amputation.  [de-identified] : maculopapular erythematous rash of face and neck

## 2020-01-22 RX ORDER — FLUOCINONIDE 1 MG/G
0.1 CREAM TOPICAL TWICE DAILY
Qty: 1 | Refills: 1 | Status: DISCONTINUED | COMMUNITY
Start: 2020-01-21 | End: 2020-01-22

## 2020-01-24 NOTE — HISTORY OF PRESENT ILLNESS
[de-identified] : Mr. Delong is a 58 y/o male, with a previously diagnosed acute myeloid leukemia. A bone marrow biopsy from 7/3 revealed Acute myeloid leukemia (AML). FISH - report shows a population of aberrant myeloid blasts. He is currently being treated by Dr. Coelho for high risk AML with HIDAC consolidation chemotherapy. He was referred by Dr. Coelho for an initial consultation of a Haplo- transplant. \par \par The patient has a history of multiple blood clots - factor V Leiden runs in the family. He was diagnosed with sleep apnea, but refuses to use CPAP. He is a former smoker, 40 years. He also has a past medical history of PE, HTN, cardiomyopathy. 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 \par Upon admission, a TLC was placed in IR. Mr. Delong received IV fluid hydration, pain management, nutritional support, anxiolysis, antiemetics, and antiviral,  antifungal, antibacterial, GI, PCP and VOD prophylaxis. When his ANC dropped  below 1000, he was started on prophylactic Cefepime. Labs were monitored on a daily basis and he received transfusional support and electrolyte repletion as needed. \par \par While in patient, Mr. Delong was continued on his Voriconazole for history of aspergillosis. 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 \par Upon admission, a TLC was placed in IR. Mr. Delong received IV fluid hydration, pain management, nutritional support, anxiolysis, antiemetics, and antiviral, antifungal, antibacterial, GI, PCP and VOD prophylaxis. When his ANC dropped below 1000, he was started on prophylactic Cefepime. Labs were monitored on a daily basis and he received transfusional support and electrolyte repletion as needed. \par \par While in patient, Mr. Delong was continued on his Voriconazole for history of aspergillosis. \par \par During admission, Mr. Delong had pancytopenia related to the high dose chemotherapy prep regimen. He also experienced neutropenic fevers. When he became febrile, blood and urine cultures were sent and a CXR was completed which were unremarkable. \par \par On 10/9/2019, following premedications, pt received 250 ml of allogeneic, related, haplo, fresh, mobilized HPC apheresis over 30-60 minutes. \par Cell counts as follows: \par \par Total MNCs (x10^8/kg) = 5.17 \par CD 34 cells (x10^6/kg) = 7.34 \par CD 3 Cells (x 10^7/kg) = 19.81 \par Cell viability (%) = 100 \par \par Pt tolerated infusion without any adverse reaction or complications. \par \par Engraftment was noted on 10/28/2019. Daily Zarxio was discontinued, as was Cefepime given negative cultures. Mr. Delong was discharged home to f/u at the [facility].  [de-identified] : On 1/21/20 visit, day +104 of SCT today. Overall feeling well. Stable SOB with exertion. Worsening pruritic rash on face, neck, and now involving upper chest for which hydrocortisone cream has not been helpful. Compliant with post transplant meds and restrictions. Currently taking FK 0.5mg daily which he did not take prior to lab draw today. Denies fever, chills, headache, dizziness, blurred vision, mucositis/odynophagia, chest pain, SOB, cough, nausea/vomiting, diarrhea/constipation, abdominal pain, dysuria, LE edema, bleeding, pain

## 2020-01-24 NOTE — PHYSICAL EXAM
[Ambulatory and capable of all self care but unable to carry out any work activities] : Status 2- Ambulatory and capable of all self care but unable to carry out any work activities. Up and about more than 50% of waking hours [Normal] : affect appropriate [de-identified] : no edema [de-identified] : +BS; abdomen soft, non-distended, non-tender [de-identified] : s/p right first toe amputation.  [de-identified] : maculopapular erythematous rash of face, neck, and upper chest

## 2020-01-24 NOTE — REVIEW OF SYSTEMS
[Fever] : no fever [Fatigue] : fatigue [Eye Pain] : no eye pain [Dry Eyes] : no dryness of the eyes [Vision Problems] : no vision problems [Dysphagia] : no dysphagia [Nosebleeds] : no nosebleeds [Odynophagia] : no odynophagia [Mucosal Pain] : no mucosal pain [Chest Pain] : no chest pain [Lower Ext Edema] : no lower extremity edema [Cough] : no cough [Abdominal Pain] : no abdominal pain [SOB on Exertion] : shortness of breath during exertion [Vomiting] : no vomiting [Constipation] : no constipation [Diarrhea] : no diarrhea [Dysuria] : no dysuria [Joint Pain] : no joint pain [Muscle Pain] : no muscle pain [Skin Rash] : skin rash [Dizziness] : no dizziness [Fainting] : no fainting [Insomnia] : insomnia [Negative] : Heme/Lymph [FreeTextEntry8] : . [de-identified] : pruritic rash of face, neck, and upper chest

## 2020-01-24 NOTE — ASSESSMENT
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\par 10/30/19 chimerism = 100% donor\par 11/14/19 chimerism = 100% donor\par 11/26/19 chimerism = 100% donor\par 12/4/19 chimerism: 100% donor\par 12/24/19 chimerism = 100% donor (CD33 = 100% and CD3 = 99.3% donor)\par 1/8/20: chimerism = 100% donor\par Post transplant BMBx pending approx day +120-150\par \par 2) Heme\par ABO compatible transplant: pt and donor are both A pos\par Ongoing anemia and thrombocytopenia but counts otherwise stable, poss 2/2 Valcyte, no indication for transfusion today\par    1/21/20:   WBC 5.2   ANC 2.6   Hgb 10.9   PLT 38\par Continue multivitamin and folic acid daily\par \par Hx of recurrent DVT/PE\par Factor V Leiden reportedly runs in the family \par 11/21/19 Doppler US of RUE negative for DVT\par Xarelto on hold while pancytopenic, to resume when PLT consistently >100K\par \par 3) ID\par Continue ppx:\par - Mepron 750mg bid\par - Voriconazole 200mg bid - hx of aspergillosis \par Post transplant crowd and dietary restrictions reviewed\par \par CMV viremia\par 11/7/19 CMV PCR = 247\par 11/14/19 CMV PCR = 2,205\par 11/20/19 CMV PCR = 8,581\par 11/26/19 CMV PCR = 4103\par 12/4/19 CMV PCR = 215\par CMV PCR not detected as of 12/10/19\par Valcyte 450mg BID started 11/21/19. Decreased to QD as of 12/17/19\par Continue to monitor PCR weekly \par \par 4) GVHD\par Skin 1   Liver 0   GI 0 - overall grade 1\par No signs of chronic GVHD at this time\par Off MMF as of 11/22/19 \par Continue FK 0.5mg daily - pending today's level\par Prednisone 10mg daily started 1/21/20 for skin GVHD as below \par Reviewed signs/symptoms of GVHD (i.e. rash, mucositis, nausea/vomiting, diarrhea)\par \par aGVHD of skin \par Maculopapular pruritic rash of face, neck, and upper chest (BSA 19%) noted 1/21/20 in setting of tapering prednisone (for FTT) - max skin stage 1, overall grade 1\par Rash not responsive to hydrocortisone cream\par Prednisone increased to 10mg daily on 1/21/20\par Lidex not covered by insurance, triamcinolone 0.1% cream bid prescribed 1/21/20\par Continue to monitor closely \par \par 5) GI\par Continue ppx:\par - Protonix 40 mg daily\par - Ursodiol 300 mg bid\par PRN Zofran and Reglan for nausea/vomiting\par \par 6) Failure to thrive\par Poor PO intake/hydration with resulting hypotension\par Prednisone 20 mg daily started 11/20/19 with resolution of symptoms\par \par 7) Other\par HTN - continue metoprolol succinate 12.5mg bid. Continue to hold enalapril 2.5mg daily in light of recent hypotension\par BPH - continue finasteride 5mg daily and tamsulosin 0.4mg daily \par Hypomagnesemia - likely 2/2 FK, continue MgOx 400mg tid\par Anxiety/Depression - continue Zoloft 50mg daily and PRN Xanax 0.5mg qhs\par \par 8) Plan/Dispo\par Pt educated regarding plan of care, all questions/concerns addressed\par Continue weekly appts at this time\par F/u with NP on 1/31/20

## 2020-01-25 ENCOUNTER — OUTPATIENT (OUTPATIENT)
Dept: OUTPATIENT SERVICES | Facility: HOSPITAL | Age: 60
LOS: 1 days | Discharge: HOME | End: 2020-01-25

## 2020-01-25 DIAGNOSIS — C92.00 ACUTE MYELOBLASTIC LEUKEMIA, NOT HAVING ACHIEVED REMISSION: ICD-10-CM

## 2020-01-27 ENCOUNTER — INPATIENT (INPATIENT)
Facility: HOSPITAL | Age: 60
LOS: 1 days | Discharge: ANOTHER HOSPITAL | DRG: 871 | End: 2020-01-29
Attending: HOSPITALIST | Admitting: STUDENT IN AN ORGANIZED HEALTH CARE EDUCATION/TRAINING PROGRAM
Payer: COMMERCIAL

## 2020-01-27 VITALS
TEMPERATURE: 99 F | WEIGHT: 186.07 LBS | HEART RATE: 112 BPM | DIASTOLIC BLOOD PRESSURE: 66 MMHG | SYSTOLIC BLOOD PRESSURE: 108 MMHG | OXYGEN SATURATION: 91 %

## 2020-01-27 DIAGNOSIS — J18.9 PNEUMONIA, UNSPECIFIED ORGANISM: ICD-10-CM

## 2020-01-27 DIAGNOSIS — I26.99 OTHER PULMONARY EMBOLISM WITHOUT ACUTE COR PULMONALE: ICD-10-CM

## 2020-01-27 DIAGNOSIS — C92.01 ACUTE MYELOBLASTIC LEUKEMIA, IN REMISSION: ICD-10-CM

## 2020-01-27 DIAGNOSIS — Z86.79 PERSONAL HISTORY OF OTHER DISEASES OF THE CIRCULATORY SYSTEM: ICD-10-CM

## 2020-01-27 DIAGNOSIS — A41.9 SEPSIS, UNSPECIFIED ORGANISM: ICD-10-CM

## 2020-01-27 DIAGNOSIS — I82.409 ACUTE EMBOLISM AND THROMBOSIS OF UNSPECIFIED DEEP VEINS OF UNSPECIFIED LOWER EXTREMITY: ICD-10-CM

## 2020-01-27 DIAGNOSIS — D69.6 THROMBOCYTOPENIA, UNSPECIFIED: ICD-10-CM

## 2020-01-27 DIAGNOSIS — D61.89 OTHER SPECIFIED APLASTIC ANEMIAS AND OTHER BONE MARROW FAILURE SYNDROMES: ICD-10-CM

## 2020-01-27 DIAGNOSIS — I48.92 UNSPECIFIED ATRIAL FLUTTER: ICD-10-CM

## 2020-01-27 LAB
ALBUMIN SERPL ELPH-MCNC: 3.3 G/DL — SIGNIFICANT CHANGE UP (ref 3.3–5)
ALP SERPL-CCNC: 70 U/L — SIGNIFICANT CHANGE UP (ref 40–120)
ALT FLD-CCNC: 11 U/L — LOW (ref 12–78)
ANION GAP SERPL CALC-SCNC: 7 MMOL/L — SIGNIFICANT CHANGE UP (ref 5–17)
APTT BLD: 28.4 SEC — LOW (ref 28.5–37)
AST SERPL-CCNC: 16 U/L — SIGNIFICANT CHANGE UP (ref 15–37)
BASOPHILS # BLD AUTO: 0 K/UL — SIGNIFICANT CHANGE UP (ref 0–0.2)
BASOPHILS NFR BLD AUTO: 0 % — SIGNIFICANT CHANGE UP (ref 0–2)
BILIRUB SERPL-MCNC: 0.6 MG/DL — SIGNIFICANT CHANGE UP (ref 0.2–1.2)
BUN SERPL-MCNC: 23 MG/DL — SIGNIFICANT CHANGE UP (ref 7–23)
CALCIUM SERPL-MCNC: 9.1 MG/DL — SIGNIFICANT CHANGE UP (ref 8.5–10.1)
CHLORIDE SERPL-SCNC: 107 MMOL/L — SIGNIFICANT CHANGE UP (ref 96–108)
CO2 SERPL-SCNC: 24 MMOL/L — SIGNIFICANT CHANGE UP (ref 22–31)
CREAT SERPL-MCNC: 1.2 MG/DL — SIGNIFICANT CHANGE UP (ref 0.5–1.3)
EOSINOPHIL # BLD AUTO: 0 K/UL — SIGNIFICANT CHANGE UP (ref 0–0.5)
EOSINOPHIL NFR BLD AUTO: 0 % — SIGNIFICANT CHANGE UP (ref 0–6)
FLU A RESULT: SIGNIFICANT CHANGE UP
FLU A RESULT: SIGNIFICANT CHANGE UP
FLUAV AG NPH QL: SIGNIFICANT CHANGE UP
FLUBV AG NPH QL: SIGNIFICANT CHANGE UP
GLUCOSE SERPL-MCNC: 148 MG/DL — HIGH (ref 70–99)
HCT VFR BLD CALC: 28.8 % — LOW (ref 39–50)
HGB BLD-MCNC: 9.3 G/DL — LOW (ref 13–17)
INR BLD: 1.22 RATIO — HIGH (ref 0.88–1.16)
LACTATE SERPL-SCNC: 1.2 MMOL/L — SIGNIFICANT CHANGE UP (ref 0.7–2)
LYMPHOCYTES # BLD AUTO: 2.36 K/UL — SIGNIFICANT CHANGE UP (ref 1–3.3)
LYMPHOCYTES # BLD AUTO: 32 % — SIGNIFICANT CHANGE UP (ref 13–44)
MCHC RBC-ENTMCNC: 32.3 GM/DL — SIGNIFICANT CHANGE UP (ref 32–36)
MCHC RBC-ENTMCNC: 35 PG — HIGH (ref 27–34)
MCV RBC AUTO: 108.3 FL — HIGH (ref 80–100)
MONOCYTES # BLD AUTO: 0.66 K/UL — SIGNIFICANT CHANGE UP (ref 0–0.9)
MONOCYTES NFR BLD AUTO: 9 % — SIGNIFICANT CHANGE UP (ref 2–14)
NEUTROPHILS # BLD AUTO: 3.91 K/UL — SIGNIFICANT CHANGE UP (ref 1.8–7.4)
NEUTROPHILS NFR BLD AUTO: 46 % — SIGNIFICANT CHANGE UP (ref 43–77)
NRBC # BLD: SIGNIFICANT CHANGE UP /100 WBCS (ref 0–0)
PLATELET # BLD AUTO: 37 K/UL — LOW (ref 150–400)
POTASSIUM SERPL-MCNC: 5 MMOL/L — SIGNIFICANT CHANGE UP (ref 3.5–5.3)
POTASSIUM SERPL-SCNC: 5 MMOL/L — SIGNIFICANT CHANGE UP (ref 3.5–5.3)
PROCALCITONIN SERPL-MCNC: 0.56 NG/ML — HIGH (ref 0–0.04)
PROT SERPL-MCNC: 6.2 G/DL — SIGNIFICANT CHANGE UP (ref 6–8.3)
PROTHROM AB SERPL-ACNC: 13.9 SEC — HIGH (ref 10–12.9)
RBC # BLD: 2.66 M/UL — LOW (ref 4.2–5.8)
RBC # FLD: 17.1 % — HIGH (ref 10.3–14.5)
RSV RESULT: SIGNIFICANT CHANGE UP
RSV RNA RESP QL NAA+PROBE: SIGNIFICANT CHANGE UP
SODIUM SERPL-SCNC: 138 MMOL/L — SIGNIFICANT CHANGE UP (ref 135–145)
WBC # BLD: 7.37 K/UL — SIGNIFICANT CHANGE UP (ref 3.8–10.5)
WBC # FLD AUTO: 7.37 K/UL — SIGNIFICANT CHANGE UP (ref 3.8–10.5)

## 2020-01-27 PROCEDURE — 99285 EMERGENCY DEPT VISIT HI MDM: CPT

## 2020-01-27 PROCEDURE — 93010 ELECTROCARDIOGRAM REPORT: CPT

## 2020-01-27 PROCEDURE — 71045 X-RAY EXAM CHEST 1 VIEW: CPT | Mod: 26

## 2020-01-27 PROCEDURE — 99223 1ST HOSP IP/OBS HIGH 75: CPT | Mod: GC

## 2020-01-27 RX ORDER — PIPERACILLIN AND TAZOBACTAM 4; .5 G/20ML; G/20ML
3.38 INJECTION, POWDER, LYOPHILIZED, FOR SOLUTION INTRAVENOUS EVERY 8 HOURS
Refills: 0 | Status: DISCONTINUED | OUTPATIENT
Start: 2020-01-27 | End: 2020-01-29

## 2020-01-27 RX ORDER — FUROSEMIDE 40 MG
60 TABLET ORAL ONCE
Refills: 0 | Status: COMPLETED | OUTPATIENT
Start: 2020-01-27 | End: 2020-01-27

## 2020-01-27 RX ORDER — SERTRALINE 25 MG/1
50 TABLET, FILM COATED ORAL DAILY
Refills: 0 | Status: DISCONTINUED | OUTPATIENT
Start: 2020-01-27 | End: 2020-01-29

## 2020-01-27 RX ORDER — VORICONAZOLE 10 MG/ML
200 INJECTION, POWDER, LYOPHILIZED, FOR SOLUTION INTRAVENOUS EVERY 12 HOURS
Refills: 0 | Status: DISCONTINUED | OUTPATIENT
Start: 2020-01-27 | End: 2020-01-29

## 2020-01-27 RX ORDER — METOPROLOL TARTRATE 50 MG
12.5 TABLET ORAL DAILY
Refills: 0 | Status: DISCONTINUED | OUTPATIENT
Start: 2020-01-27 | End: 2020-01-29

## 2020-01-27 RX ORDER — ATOVAQUONE 750 MG/5ML
750 SUSPENSION ORAL
Refills: 0 | Status: DISCONTINUED | OUTPATIENT
Start: 2020-01-27 | End: 2020-01-29

## 2020-01-27 RX ORDER — MAGNESIUM OXIDE 400 MG ORAL TABLET 241.3 MG
400 TABLET ORAL
Refills: 0 | Status: DISCONTINUED | OUTPATIENT
Start: 2020-01-27 | End: 2020-01-29

## 2020-01-27 RX ORDER — ACYCLOVIR SODIUM 500 MG
400 VIAL (EA) INTRAVENOUS EVERY 8 HOURS
Refills: 0 | Status: DISCONTINUED | OUTPATIENT
Start: 2020-01-27 | End: 2020-01-29

## 2020-01-27 RX ORDER — FOLIC ACID 0.8 MG
1 TABLET ORAL DAILY
Refills: 0 | Status: DISCONTINUED | OUTPATIENT
Start: 2020-01-27 | End: 2020-01-29

## 2020-01-27 RX ORDER — PIPERACILLIN AND TAZOBACTAM 4; .5 G/20ML; G/20ML
3.38 INJECTION, POWDER, LYOPHILIZED, FOR SOLUTION INTRAVENOUS ONCE
Refills: 0 | Status: COMPLETED | OUTPATIENT
Start: 2020-01-27 | End: 2020-01-27

## 2020-01-27 RX ORDER — ALBUTEROL 90 UG/1
2.5 AEROSOL, METERED ORAL ONCE
Refills: 0 | Status: COMPLETED | OUTPATIENT
Start: 2020-01-27 | End: 2020-01-27

## 2020-01-27 RX ORDER — PIPERACILLIN AND TAZOBACTAM 4; .5 G/20ML; G/20ML
3.38 INJECTION, POWDER, LYOPHILIZED, FOR SOLUTION INTRAVENOUS ONCE
Refills: 0 | Status: DISCONTINUED | OUTPATIENT
Start: 2020-01-27 | End: 2020-01-27

## 2020-01-27 RX ORDER — VANCOMYCIN HCL 1 G
1000 VIAL (EA) INTRAVENOUS ONCE
Refills: 0 | Status: COMPLETED | OUTPATIENT
Start: 2020-01-27 | End: 2020-01-27

## 2020-01-27 RX ORDER — TAMSULOSIN HYDROCHLORIDE 0.4 MG/1
0.4 CAPSULE ORAL AT BEDTIME
Refills: 0 | Status: DISCONTINUED | OUTPATIENT
Start: 2020-01-27 | End: 2020-01-29

## 2020-01-27 RX ORDER — SODIUM CHLORIDE 9 MG/ML
1000 INJECTION INTRAMUSCULAR; INTRAVENOUS; SUBCUTANEOUS ONCE
Refills: 0 | Status: COMPLETED | OUTPATIENT
Start: 2020-01-27 | End: 2020-01-27

## 2020-01-27 RX ORDER — FINASTERIDE 5 MG/1
5 TABLET, FILM COATED ORAL DAILY
Refills: 0 | Status: DISCONTINUED | OUTPATIENT
Start: 2020-01-27 | End: 2020-01-29

## 2020-01-27 RX ORDER — URSODIOL 250 MG/1
300 TABLET, FILM COATED ORAL
Refills: 0 | Status: DISCONTINUED | OUTPATIENT
Start: 2020-01-27 | End: 2020-01-29

## 2020-01-27 RX ORDER — PANTOPRAZOLE SODIUM 20 MG/1
40 TABLET, DELAYED RELEASE ORAL
Refills: 0 | Status: DISCONTINUED | OUTPATIENT
Start: 2020-01-27 | End: 2020-01-29

## 2020-01-27 RX ORDER — ALPRAZOLAM 0.25 MG
0.5 TABLET ORAL AT BEDTIME
Refills: 0 | Status: DISCONTINUED | OUTPATIENT
Start: 2020-01-27 | End: 2020-01-29

## 2020-01-27 RX ORDER — SODIUM CHLORIDE 9 MG/ML
1000 INJECTION, SOLUTION INTRAVENOUS
Refills: 0 | Status: DISCONTINUED | OUTPATIENT
Start: 2020-01-27 | End: 2020-01-28

## 2020-01-27 RX ORDER — TACROLIMUS 5 MG/1
0.5 CAPSULE ORAL DAILY
Refills: 0 | Status: DISCONTINUED | OUTPATIENT
Start: 2020-01-27 | End: 2020-01-29

## 2020-01-27 RX ORDER — VANCOMYCIN HCL 1 G
1000 VIAL (EA) INTRAVENOUS EVERY 12 HOURS
Refills: 0 | Status: DISCONTINUED | OUTPATIENT
Start: 2020-01-27 | End: 2020-01-29

## 2020-01-27 RX ADMIN — Medication 250 MILLIGRAM(S): at 21:30

## 2020-01-27 RX ADMIN — TAMSULOSIN HYDROCHLORIDE 0.4 MILLIGRAM(S): 0.4 CAPSULE ORAL at 23:23

## 2020-01-27 RX ADMIN — Medication 400 MILLIGRAM(S): at 23:23

## 2020-01-27 RX ADMIN — SODIUM CHLORIDE 1000 MILLILITER(S): 9 INJECTION INTRAMUSCULAR; INTRAVENOUS; SUBCUTANEOUS at 20:57

## 2020-01-27 RX ADMIN — SODIUM CHLORIDE 75 MILLILITER(S): 9 INJECTION, SOLUTION INTRAVENOUS at 23:23

## 2020-01-27 RX ADMIN — PIPERACILLIN AND TAZOBACTAM 200 GRAM(S): 4; .5 INJECTION, POWDER, LYOPHILIZED, FOR SOLUTION INTRAVENOUS at 20:50

## 2020-01-27 RX ADMIN — ALBUTEROL 2.5 MILLIGRAM(S): 90 AEROSOL, METERED ORAL at 20:50

## 2020-01-27 RX ADMIN — Medication 0.5 MILLIGRAM(S): at 23:23

## 2020-01-27 NOTE — H&P ADULT - PROBLEM SELECTOR PLAN 1
Pt. s/p bone marrow transplant in October 2019. Continue IV vanco and zosyn. ICU consult called. 50% ventimask applied to maintain O2 above 92%. ID consult called (Dr. Michael).

## 2020-01-27 NOTE — H&P ADULT - NSHPREVIEWOFSYSTEMS_GEN_ALL_CORE
Constitutional: denies fever, chills, diaphoresis   HEENT: denies blurry vision, difficulty hearing  Respiratory: admits SOB, admits GEORGES, admits cough, admits sputum production, wheezing, denies hemoptysis  Cardiovascular: denies CP, palpitations, edema  Gastrointestinal: denies nausea, vomiting, diarrhea, constipation, abdominal pain, melena, hematochezia   Genitourinary: denies dysuria, frequency, urgency, hematuria   Skin/Breast: denies rash, itching  Musculoskeletal: denies myalgias, joint swelling, muscle weakness  Neurologic: denies headache, weakness, dizziness, paresthesias, numbness/tingling  Psychiatric: denies feeling anxious, depressed, suicidal, homicidal thoughts  ROS negative except as noted above

## 2020-01-27 NOTE — ED PROVIDER NOTE - CONSTITUTIONAL, MLM
normal... Weak appearing, thin older white male, awake, alert, oriented to person, place, time/situation and in no apparent distress.

## 2020-01-27 NOTE — H&P ADULT - NSHPPHYSICALEXAM_GEN_ALL_CORE
Physical Exam:  General: Ill appearing, generalized pallor, distress secondary to dyspnea  HEENT: NCAT, PERRLA, EOMI bl, +mucus from b/l eyes, dry lips and mouth  Neck: Supple, nontender, no mass  Neurology: A&Ox3, nonfocal, CN II-XII grossly intact, sensation intact, no gait abnormalities   Respiratory: tachypnic, +L sided rhonchi  CV: tachy, +S1/S2, no murmurs, rubs or gallops  Abdominal: Soft, NT, ND +BSx4  Extremities: No C/C/E, + peripheral pulses  MSK: Normal ROM, no joint erythema or warmth, no joint swelling

## 2020-01-27 NOTE — CONSULT NOTE ADULT - SUBJECTIVE AND OBJECTIVE BOX
CC:  Patient is a 59y old  Male who presents with a chief complaint of Pneumonia (27 Jan 2020 21:20)      HPI/BRIEF HOSPITAL COURSE:  60 yo PMH COLLEEN,, DVT s/p bone marrow transplant in October 2019, on ppx antiviral/antifungal/abx. He states two days ago he developed cough. The cough was non-productive. The cough became progressively worse until today when he developed fever and difficulty breathing, SOB while in bed relaxing. O2 saturation was checked today along with temperature. O2 sat was 86% on room air and temp was 102.F.  He came to the ER because of his SOB. Pt. states he saw his bone marrow doctor (Dr. Gaytan [phone]), last week; he was told everything was ok. He reports a history of multiple DVTs and PEs, but he is off anti-coagulation due to low platelets.      ICU c/s called for respiratory distress, pt with expiratory wheeze and productive cough. Pt audibly congested on exam. Bedside POCUS with diffuse BL Bines and BL pleural effusions. Pt given 1 time dose of lasix to mobilize fluid. Component of SOB likely superimposed PNA with fluid overload       PAST MEDICAL & SURGICAL HISTORY:  COLLEEN (obstructive sleep apnea)  Pulmonary embolism  Deep vein thrombosis (DVT)  H/O cardiomyopathy  Factor 5 Leiden mutation, heterozygous  No significant past surgical history: bone marrow transplant    Allergies    No Known Allergies    Intolerances      FAMILY HISTORY:      Review of Systems:  CONSTITUTIONAL: No fever, chills, + fatigue  EYES: No eye pain, visual disturbances, or discharge  ENMT: No sinus or throat pain  NECK: No pain or stiffness  RESPIRATORY: + productive  cough, No shortness of breath  CARDIOVASCULAR: No chest pain, palpitations, dizziness, or leg swelling  GASTROINTESTINAL: No abdominal or epigastric pain. No nausea, vomiting, or hematemesis; No diarrhea or constipation. No melena  GENITOURINARY: No dysuria, frequency, hematuria, or incontinence  NEUROLOGICAL: No headaches, numbness, or tremors  SKIN: No itching, burning, rashes, or lesions   MUSCULOSKELETAL: No muscle, back, or extremity pain  PSYCHIATRIC: difficulty sleeping      Medications:  acyclovir   Oral Tab/Cap 400 milliGRAM(s) Oral every 8 hours  atovaquone Suspension 750 milliGRAM(s) Oral two times a day  piperacillin/tazobactam IVPB.. 3.375 Gram(s) IV Intermittent every 8 hours  vancomycin  IVPB 1000 milliGRAM(s) IV Intermittent every 12 hours  voriconazole 200 milliGRAM(s) Oral every 12 hours  metoprolol succinate ER 12.5 milliGRAM(s) Oral daily  tamsulosin 0.4 milliGRAM(s) Oral at bedtime  ALPRAZolam 0.5 milliGRAM(s) Oral at bedtime  sertraline 50 milliGRAM(s) Oral daily  pantoprazole    Tablet 40 milliGRAM(s) Oral before breakfast  ursodiol Oral Tab/Cap - Peds 300 milliGRAM(s) Oral two times a day with meals  fnasteride 5 milliGRAM(s) Oral daily  folic acid 1 milliGRAM(s) Oral daily  magnesium oxide 400 milliGRAM(s) Oral three times a day with meals  multivitamin 1 Tablet(s) Oral daily  tacrolimus 0.5 milliGRAM(s) Oral daily    ICU Vital Signs Last 24 Hrs  T(C): 37.1 (28 Jan 2020 01:59), Max: 37.3 (27 Jan 2020 18:19)  T(F): 98.7 (28 Jan 2020 01:59), Max: 99.2 (27 Jan 2020 18:19)  HR: 102 (28 Jan 2020 04:00) (102 - 112)  BP: 105/75 (28 Jan 2020 04:00) (105/75 - 119/83)  BP(mean): --  ABP: --  ABP(mean): --  RR: 19 (28 Jan 2020 04:00) (19 - 20)  SpO2: 96% (28 Jan 2020 04:00) (91% - 96%)    Vital Signs Last 24 Hrs  T(C): 37.1 (28 Jan 2020 01:59), Max: 37.3 (27 Jan 2020 18:19)  T(F): 98.7 (28 Jan 2020 01:59), Max: 99.2 (27 Jan 2020 18:19)  HR: 102 (28 Jan 2020 04:00) (102 - 112)  BP: 105/75 (28 Jan 2020 04:00) (105/75 - 119/83)  BP(mean): --  RR: 19 (28 Jan 2020 04:00) (19 - 20)  SpO2: 96% (28 Jan 2020 04:00) (91% - 96%)        I&O's Detail        LABS:                        8.8    7.73  )-----------( 35       ( 28 Jan 2020 00:25 )             27.1     01-28    137  |  108  |  22  ----------------------------<  145<H>  4.8   |  21<L>  |  1.10    Ca    8.7      28 Jan 2020 00:25    TPro  5.8<L>  /  Alb  3.0<L>  /  TBili  0.6  /  DBili  x   /  AST  13<L>  /  ALT  10<L>  /  AlkPhos  66  01-28          CAPILLARY BLOOD GLUCOSE        PT/INR - ( 27 Jan 2020 19:52 )   PT: 13.9 sec;   INR: 1.22 ratio         PTT - ( 27 Jan 2020 19:52 )  PTT:28.4 sec    CULTURES:    acyclovir   Oral Tab/Cap 400 milliGRAM(s) Oral every 8 hours  atovaquone Suspension 750 milliGRAM(s) Oral two times a day  piperacillin/tazobactam IVPB.. 3.375 Gram(s) IV Intermittent every 8 hours  vancomycin  IVPB 1000 milliGRAM(s) IV Intermittent every 12 hours  voriconazole 200 milliGRAM(s) Oral every 12 hours      Physical Examination:  General: No acute distress.  Alert, oriented, interactive, nonfocal  NEURO: A&O X3, motor function 5/5 BL UE/LE  HEENT: Pupils equal, reactive to light.  Symmetric.  PULM: coarse BS anteriorly + significant sputum production, + wheezes, diminished BS at bases    CVS: Regular rate and rhythm, no murmurs, rubs, or gallops  ABD: Soft, nondistended, nontender, normoactive bowel sounds, no masses  EXT: + LE edema, nontender  SKIN: Warm and well perfused, no rashes noted      EKG: NSR    RADIOLOGY:   CXR with pulmonary cephalization     POCUS:          LUNG:               (+/-) A-line  (+/-) B-line predominantly anteriorly              (+/-) Effusions, (+/-) infiltrate, (+/-) atelectasis, (+/-) B-lines at bases, (+/-) curtain's sign               (+/-)  Lung sliding          CARDIAC:               LV contractility/EF WNL, (+/-)LVH              (+/-) atrial enlargement               parasternal short view concentric w/o wall motion abnormality              (+/-) signs of RV strain, (+/-) septal flattening/D'ing, (+/-) Walker's sign               (+/-) pericardial effusion               (+/-) valvular dz or notable vegetations               5 chamber VTI ***              Subcostal view IVC ***cm CC:  Patient is a 59y old  Male who presents with a chief complaint of Pneumonia (27 Jan 2020 21:20)      HPI/BRIEF HOSPITAL COURSE:  58 yo PMH COLLEEN,, DVT s/p bone marrow transplant in October 2019, on ppx antiviral/antifungal/abx. He states two days ago he developed cough. The cough was non-productive. The cough became progressively worse until today when he developed fever and difficulty breathing, SOB while in bed relaxing. O2 saturation was checked today along with temperature. O2 sat was 86% on room air and temp was 102.F.  He came to the ER because of his SOB. Pt. states he saw his bone marrow doctor (Dr. Gaytan [phone]), last week; he was told everything was ok. He reports a history of multiple DVTs and PEs, but he is off anti-coagulation due to low platelets.      ICU c/s called for respiratory distress, pt with expiratory wheeze and productive cough. Pt audibly congested on exam. Bedside POCUS with diffuse BL Bines and BL pleural effusions. Pt given 1 time dose of lasix to mobilize fluid. Component of SOB likely superimposed PNA with fluid overload       PAST MEDICAL & SURGICAL HISTORY:  COLLEEN (obstructive sleep apnea)  Pulmonary embolism  Deep vein thrombosis (DVT)  H/O cardiomyopathy  Factor 5 Leiden mutation, heterozygous  No significant past surgical history: bone marrow transplant    Allergies    No Known Allergies    Intolerances      FAMILY HISTORY:      Review of Systems:  CONSTITUTIONAL: No fever, chills, + fatigue  EYES: No eye pain, visual disturbances, or discharge  ENMT: No sinus or throat pain  NECK: No pain or stiffness  RESPIRATORY: + productive  cough, No shortness of breath  CARDIOVASCULAR: No chest pain, palpitations, dizziness, or leg swelling  GASTROINTESTINAL: No abdominal or epigastric pain. No nausea, vomiting, or hematemesis; No diarrhea or constipation. No melena  GENITOURINARY: No dysuria, frequency, hematuria, or incontinence  NEUROLOGICAL: No headaches, numbness, or tremors  SKIN: No itching, burning, rashes, or lesions   MUSCULOSKELETAL: No muscle, back, or extremity pain  PSYCHIATRIC: difficulty sleeping      Medications:  acyclovir   Oral Tab/Cap 400 milliGRAM(s) Oral every 8 hours  atovaquone Suspension 750 milliGRAM(s) Oral two times a day  piperacillin/tazobactam IVPB.. 3.375 Gram(s) IV Intermittent every 8 hours  vancomycin  IVPB 1000 milliGRAM(s) IV Intermittent every 12 hours  voriconazole 200 milliGRAM(s) Oral every 12 hours  metoprolol succinate ER 12.5 milliGRAM(s) Oral daily  tamsulosin 0.4 milliGRAM(s) Oral at bedtime  ALPRAZolam 0.5 milliGRAM(s) Oral at bedtime  sertraline 50 milliGRAM(s) Oral daily  pantoprazole    Tablet 40 milliGRAM(s) Oral before breakfast  ursodiol Oral Tab/Cap - Peds 300 milliGRAM(s) Oral two times a day with meals  fnasteride 5 milliGRAM(s) Oral daily  folic acid 1 milliGRAM(s) Oral daily  magnesium oxide 400 milliGRAM(s) Oral three times a day with meals  multivitamin 1 Tablet(s) Oral daily  tacrolimus 0.5 milliGRAM(s) Oral daily    ICU Vital Signs Last 24 Hrs  T(C): 37.1 (28 Jan 2020 01:59), Max: 37.3 (27 Jan 2020 18:19)  T(F): 98.7 (28 Jan 2020 01:59), Max: 99.2 (27 Jan 2020 18:19)  HR: 102 (28 Jan 2020 04:00) (102 - 112)  BP: 105/75 (28 Jan 2020 04:00) (105/75 - 119/83)  BP(mean): --  ABP: --  ABP(mean): --  RR: 19 (28 Jan 2020 04:00) (19 - 20)  SpO2: 96% (28 Jan 2020 04:00) (91% - 96%)    Vital Signs Last 24 Hrs  T(C): 37.1 (28 Jan 2020 01:59), Max: 37.3 (27 Jan 2020 18:19)  T(F): 98.7 (28 Jan 2020 01:59), Max: 99.2 (27 Jan 2020 18:19)  HR: 102 (28 Jan 2020 04:00) (102 - 112)  BP: 105/75 (28 Jan 2020 04:00) (105/75 - 119/83)  BP(mean): --  RR: 19 (28 Jan 2020 04:00) (19 - 20)  SpO2: 96% (28 Jan 2020 04:00) (91% - 96%)        I&O's Detail        LABS:                        8.8    7.73  )-----------( 35       ( 28 Jan 2020 00:25 )             27.1     01-28    137  |  108  |  22  ----------------------------<  145<H>  4.8   |  21<L>  |  1.10    Ca    8.7      28 Jan 2020 00:25    TPro  5.8<L>  /  Alb  3.0<L>  /  TBili  0.6  /  DBili  x   /  AST  13<L>  /  ALT  10<L>  /  AlkPhos  66  01-28          CAPILLARY BLOOD GLUCOSE        PT/INR - ( 27 Jan 2020 19:52 )   PT: 13.9 sec;   INR: 1.22 ratio         PTT - ( 27 Jan 2020 19:52 )  PTT:28.4 sec    CULTURES:    acyclovir   Oral Tab/Cap 400 milliGRAM(s) Oral every 8 hours  atovaquone Suspension 750 milliGRAM(s) Oral two times a day  piperacillin/tazobactam IVPB.. 3.375 Gram(s) IV Intermittent every 8 hours  vancomycin  IVPB 1000 milliGRAM(s) IV Intermittent every 12 hours  voriconazole 200 milliGRAM(s) Oral every 12 hours      Physical Examination:  General: No acute distress.  Alert, oriented, interactive, nonfocal  NEURO: A&O X3, motor function 5/5 BL UE/LE  HEENT: Pupils equal, reactive to light.  Symmetric.  PULM: coarse BS anteriorly + significant sputum production, + wheezes, diminished BS at bases    CVS: Regular rate and rhythm, no murmurs, rubs, or gallops  ABD: Soft, nondistended, nontender, normoactive bowel sounds, no masses  EXT: + LE edema, nontender  SKIN: Warm and well perfused, no rashes noted      EKG: NSR    RADIOLOGY:   CXR with pulmonary cephalization     POCUS:          LUNG:               (+) B-line anteriorly              (+) BL Effusions, (+) infiltrate, (+) atelectasis,

## 2020-01-27 NOTE — H&P ADULT - HISTORY OF PRESENT ILLNESS
Pt is s/p bone marrow transplant in October 2019. He states two days ago he developed cough. The cough was non-productive. The cough became progressively worse until today when he developed fever and difficulty breathing. He came to the ER because of his SOB. Pt. states he saw his bone marrow doctor (Dr. Gaytan [phone]), last week; he was told everything was ok. He reports a history of multiple DVTs and PEs, but he is off anti-coagulation due to low platelettes.

## 2020-01-27 NOTE — ED ADULT NURSE NOTE - PMH
Deep vein thrombosis (DVT)    Factor 5 Leiden mutation, heterozygous    H/O cardiomyopathy    COLLEEN (obstructive sleep apnea)    Pulmonary embolism

## 2020-01-27 NOTE — CONSULT NOTE ADULT - ASSESSMENT
ASSESSMENT   58 yo PMH COLLEEN,, DVT s/p bone marrow transplant in October 2019, on ppx antiviral/antifungal/abx. He states two days ago he developed cough. The cough was non-productive. The cough became progressively worse until today when he developed fever and difficulty breathing, SOB while in bed relaxing. O2 saturation was checked today along with temperature. O2 sat was 86% on room air and temp was 102.F. ICU c/s called for respiratory distress, pt with expiratory wheeze and productive cough. Pt audibly congested on exam. Bedside POCUS with diffuse BL Bines and BL pleural effusions. Pt given 1 time dose of lasix to mobilize fluid. Component of SOB likely superimposed PNA with fluid overload     1. Acute hypoxic respiratory failure   2. Lobar PNA   3. Immunosuppression   4. severe sepsis   5. acute pulmonary edema     -Pt on venti mask at this time breathing non-labored, BiPAP PRN suggested if WOB   -Lasix 40 given  to mobilize fluid in lungs   -BS ABX/antiviral and antifungal   -solumderol and nebs   -F/u culture      CODE STATUS: Full Code  GOC discussion: Y    Critical Care time: 40 mins assessing presenting problems of acute illness that poses high probability of life threatening deterioration or end organ damage/dysfunction.  Medical decision making including Initiating plan of care, reviewing data, reviewing radiology, direct patient bedside evaluation and interpretation of vital signs, any necessary ventilator management , discussion with multidisciplinary team, discussing goals of care with patient/family, all non inclusive of procedures     Care discussed with bedside provider and eICU attending. If any additional assistance in care/management of patient required by bedside team, provider/RN/family member advised to push "e-alert" button in patient's room, and details will be discussed at that time

## 2020-01-27 NOTE — H&P ADULT - PROBLEM SELECTOR PLAN 2
Pt's BP stabilized with 1 liter of fluid. Given h/o of cardiomyopathy with EF of 24% will be conservative with fluid resuscitation. Contine IV vanco and zosyn, and home antifungals and antivirals.

## 2020-01-27 NOTE — ED PROVIDER NOTE - OBJECTIVE STATEMENT
60 yo white male s/p Bone Marrow Transplant in October 2019 for AML, well until this past Thursday when he began to experience a non productive cough w/o fever or chills. 2-days ago cough worsened and then last evening patient noted worsening SOB while in bed relaxing. O2 saturation was checked today along with temperature. O2 sat was 86% on room air and temp was 102.4. No headache. No chest pain. No neck pain. No nausea, vomiting or diarrhea. No hemoptysis. No body aches. No recent travel or ill contacts.

## 2020-01-27 NOTE — H&P ADULT - NSHPSOCIALHISTORY_GEN_ALL_CORE
Social History/Preventive Medicine:    Substance Use: denies  Tobacco Usage:  former smoker  Alcohol Usage: denies  Illicit Drug Usage: denies

## 2020-01-28 ENCOUNTER — TRANSCRIPTION ENCOUNTER (OUTPATIENT)
Age: 60
End: 2020-01-28

## 2020-01-28 LAB
ALBUMIN SERPL ELPH-MCNC: 3 G/DL — LOW (ref 3.3–5)
ALP SERPL-CCNC: 66 U/L — SIGNIFICANT CHANGE UP (ref 40–120)
ALT FLD-CCNC: 10 U/L — LOW (ref 12–78)
ANION GAP SERPL CALC-SCNC: 8 MMOL/L — SIGNIFICANT CHANGE UP (ref 5–17)
APPEARANCE UR: CLEAR — SIGNIFICANT CHANGE UP
AST SERPL-CCNC: 13 U/L — LOW (ref 15–37)
BASOPHILS # BLD AUTO: 0 K/UL — SIGNIFICANT CHANGE UP (ref 0–0.2)
BASOPHILS NFR BLD AUTO: 0 % — SIGNIFICANT CHANGE UP (ref 0–2)
BILIRUB SERPL-MCNC: 0.6 MG/DL — SIGNIFICANT CHANGE UP (ref 0.2–1.2)
BILIRUB UR-MCNC: NEGATIVE — SIGNIFICANT CHANGE UP
BUN SERPL-MCNC: 22 MG/DL — SIGNIFICANT CHANGE UP (ref 7–23)
CALCIUM SERPL-MCNC: 8.7 MG/DL — SIGNIFICANT CHANGE UP (ref 8.5–10.1)
CHLORIDE SERPL-SCNC: 108 MMOL/L — SIGNIFICANT CHANGE UP (ref 96–108)
CO2 SERPL-SCNC: 21 MMOL/L — LOW (ref 22–31)
COLOR SPEC: YELLOW — SIGNIFICANT CHANGE UP
CREAT SERPL-MCNC: 1.1 MG/DL — SIGNIFICANT CHANGE UP (ref 0.5–1.3)
DIFF PNL FLD: NEGATIVE — SIGNIFICANT CHANGE UP
EOSINOPHIL # BLD AUTO: 0.08 K/UL — SIGNIFICANT CHANGE UP (ref 0–0.5)
EOSINOPHIL NFR BLD AUTO: 1 % — SIGNIFICANT CHANGE UP (ref 0–6)
GLUCOSE SERPL-MCNC: 145 MG/DL — HIGH (ref 70–99)
GLUCOSE UR QL: NEGATIVE — SIGNIFICANT CHANGE UP
HCT VFR BLD CALC: 27.1 % — LOW (ref 39–50)
HGB BLD-MCNC: 8.8 G/DL — LOW (ref 13–17)
KETONES UR-MCNC: NEGATIVE — SIGNIFICANT CHANGE UP
LEUKOCYTE ESTERASE UR-ACNC: NEGATIVE — SIGNIFICANT CHANGE UP
LYMPHOCYTES # BLD AUTO: 2.16 K/UL — SIGNIFICANT CHANGE UP (ref 1–3.3)
LYMPHOCYTES # BLD AUTO: 28 % — SIGNIFICANT CHANGE UP (ref 13–44)
MCHC RBC-ENTMCNC: 32.5 GM/DL — SIGNIFICANT CHANGE UP (ref 32–36)
MCHC RBC-ENTMCNC: 35.1 PG — HIGH (ref 27–34)
MCV RBC AUTO: 108 FL — HIGH (ref 80–100)
MONOCYTES # BLD AUTO: 0.31 K/UL — SIGNIFICANT CHANGE UP (ref 0–0.9)
MONOCYTES NFR BLD AUTO: 4 % — SIGNIFICANT CHANGE UP (ref 2–14)
NEUTROPHILS # BLD AUTO: 5.18 K/UL — SIGNIFICANT CHANGE UP (ref 1.8–7.4)
NEUTROPHILS NFR BLD AUTO: 62 % — SIGNIFICANT CHANGE UP (ref 43–77)
NITRITE UR-MCNC: NEGATIVE — SIGNIFICANT CHANGE UP
NRBC # BLD: SIGNIFICANT CHANGE UP /100 WBCS (ref 0–0)
PH UR: 5 — SIGNIFICANT CHANGE UP (ref 5–8)
PLATELET # BLD AUTO: 35 K/UL — LOW (ref 150–400)
POTASSIUM SERPL-MCNC: 4.8 MMOL/L — SIGNIFICANT CHANGE UP (ref 3.5–5.3)
POTASSIUM SERPL-SCNC: 4.8 MMOL/L — SIGNIFICANT CHANGE UP (ref 3.5–5.3)
PROT SERPL-MCNC: 5.8 G/DL — LOW (ref 6–8.3)
PROT UR-MCNC: 15
RAPID RVP RESULT: SIGNIFICANT CHANGE UP
RBC # BLD: 2.51 M/UL — LOW (ref 4.2–5.8)
RBC # FLD: 17.1 % — HIGH (ref 10.3–14.5)
SODIUM SERPL-SCNC: 137 MMOL/L — SIGNIFICANT CHANGE UP (ref 135–145)
SP GR SPEC: 1.01 — SIGNIFICANT CHANGE UP (ref 1.01–1.02)
UROBILINOGEN FLD QL: NEGATIVE — SIGNIFICANT CHANGE UP
WBC # BLD: 7.73 K/UL — SIGNIFICANT CHANGE UP (ref 3.8–10.5)
WBC # FLD AUTO: 7.73 K/UL — SIGNIFICANT CHANGE UP (ref 3.8–10.5)

## 2020-01-28 PROCEDURE — 99239 HOSP IP/OBS DSCHRG MGMT >30: CPT | Mod: GC

## 2020-01-28 PROCEDURE — 99222 1ST HOSP IP/OBS MODERATE 55: CPT

## 2020-01-28 PROCEDURE — 93010 ELECTROCARDIOGRAM REPORT: CPT

## 2020-01-28 RX ORDER — ALPRAZOLAM 0.25 MG
0 TABLET ORAL
Qty: 0 | Refills: 0 | DISCHARGE

## 2020-01-28 RX ORDER — FOLIC ACID 0.8 MG
1 TABLET ORAL
Qty: 0 | Refills: 0 | DISCHARGE
Start: 2020-01-28

## 2020-01-28 RX ORDER — PANTOPRAZOLE SODIUM 20 MG/1
1 TABLET, DELAYED RELEASE ORAL
Qty: 0 | Refills: 0 | DISCHARGE
Start: 2020-01-28

## 2020-01-28 RX ORDER — VORICONAZOLE 10 MG/ML
1 INJECTION, POWDER, LYOPHILIZED, FOR SOLUTION INTRAVENOUS
Qty: 0 | Refills: 0 | DISCHARGE

## 2020-01-28 RX ORDER — MAGNESIUM OXIDE 400 MG ORAL TABLET 241.3 MG
1 TABLET ORAL
Qty: 0 | Refills: 0 | DISCHARGE
Start: 2020-01-28

## 2020-01-28 RX ORDER — TAMSULOSIN HYDROCHLORIDE 0.4 MG/1
1 CAPSULE ORAL
Qty: 0 | Refills: 0 | DISCHARGE
Start: 2020-01-28

## 2020-01-28 RX ORDER — SERTRALINE 25 MG/1
1 TABLET, FILM COATED ORAL
Qty: 0 | Refills: 0 | DISCHARGE
Start: 2020-01-28

## 2020-01-28 RX ORDER — TACROLIMUS 5 MG/1
1 CAPSULE ORAL
Qty: 0 | Refills: 0 | DISCHARGE
Start: 2020-01-28

## 2020-01-28 RX ORDER — CIPROFLOXACIN HCL 0.3 %
1 DROPS OPHTHALMIC (EYE) EVERY 4 HOURS
Refills: 0 | Status: DISCONTINUED | OUTPATIENT
Start: 2020-01-28 | End: 2020-01-29

## 2020-01-28 RX ORDER — IPRATROPIUM/ALBUTEROL SULFATE 18-103MCG
3 AEROSOL WITH ADAPTER (GRAM) INHALATION
Qty: 0 | Refills: 0 | DISCHARGE
Start: 2020-01-28

## 2020-01-28 RX ORDER — VORICONAZOLE 10 MG/ML
1 INJECTION, POWDER, LYOPHILIZED, FOR SOLUTION INTRAVENOUS
Qty: 0 | Refills: 0 | DISCHARGE
Start: 2020-01-28

## 2020-01-28 RX ORDER — FINASTERIDE 5 MG/1
1 TABLET, FILM COATED ORAL
Qty: 0 | Refills: 0 | DISCHARGE
Start: 2020-01-28

## 2020-01-28 RX ORDER — VANCOMYCIN HCL 1 G
1 VIAL (EA) INTRAVENOUS
Qty: 0 | Refills: 0 | DISCHARGE
Start: 2020-01-28

## 2020-01-28 RX ORDER — ALPRAZOLAM 0.25 MG
1 TABLET ORAL
Qty: 0 | Refills: 0 | DISCHARGE
Start: 2020-01-28

## 2020-01-28 RX ORDER — ATOVAQUONE 750 MG/5ML
5 SUSPENSION ORAL
Qty: 0 | Refills: 0 | DISCHARGE
Start: 2020-01-28

## 2020-01-28 RX ORDER — IPRATROPIUM/ALBUTEROL SULFATE 18-103MCG
3 AEROSOL WITH ADAPTER (GRAM) INHALATION EVERY 6 HOURS
Refills: 0 | Status: DISCONTINUED | OUTPATIENT
Start: 2020-01-28 | End: 2020-01-29

## 2020-01-28 RX ORDER — SERTRALINE 25 MG/1
1 TABLET, FILM COATED ORAL
Qty: 0 | Refills: 0 | DISCHARGE

## 2020-01-28 RX ORDER — URSODIOL 250 MG/1
1 TABLET, FILM COATED ORAL
Qty: 0 | Refills: 0 | DISCHARGE
Start: 2020-01-28

## 2020-01-28 RX ORDER — PIPERACILLIN AND TAZOBACTAM 4; .5 G/20ML; G/20ML
3.38 INJECTION, POWDER, LYOPHILIZED, FOR SOLUTION INTRAVENOUS
Qty: 0 | Refills: 0 | DISCHARGE
Start: 2020-01-28

## 2020-01-28 RX ORDER — ACYCLOVIR SODIUM 500 MG
1 VIAL (EA) INTRAVENOUS
Qty: 0 | Refills: 0 | DISCHARGE
Start: 2020-01-28

## 2020-01-28 RX ORDER — FINASTERIDE 5 MG/1
1 TABLET, FILM COATED ORAL
Qty: 0 | Refills: 0 | DISCHARGE

## 2020-01-28 RX ADMIN — Medication 250 MILLIGRAM(S): at 18:33

## 2020-01-28 RX ADMIN — VORICONAZOLE 200 MILLIGRAM(S): 10 INJECTION, POWDER, LYOPHILIZED, FOR SOLUTION INTRAVENOUS at 18:34

## 2020-01-28 RX ADMIN — ATOVAQUONE 750 MILLIGRAM(S): 750 SUSPENSION ORAL at 06:05

## 2020-01-28 RX ADMIN — PIPERACILLIN AND TAZOBACTAM 25 GRAM(S): 4; .5 INJECTION, POWDER, LYOPHILIZED, FOR SOLUTION INTRAVENOUS at 22:45

## 2020-01-28 RX ADMIN — Medication 12.5 MILLIGRAM(S): at 06:05

## 2020-01-28 RX ADMIN — MAGNESIUM OXIDE 400 MG ORAL TABLET 400 MILLIGRAM(S): 241.3 TABLET ORAL at 13:38

## 2020-01-28 RX ADMIN — VORICONAZOLE 200 MILLIGRAM(S): 10 INJECTION, POWDER, LYOPHILIZED, FOR SOLUTION INTRAVENOUS at 06:05

## 2020-01-28 RX ADMIN — PIPERACILLIN AND TAZOBACTAM 25 GRAM(S): 4; .5 INJECTION, POWDER, LYOPHILIZED, FOR SOLUTION INTRAVENOUS at 13:35

## 2020-01-28 RX ADMIN — TAMSULOSIN HYDROCHLORIDE 0.4 MILLIGRAM(S): 0.4 CAPSULE ORAL at 22:42

## 2020-01-28 RX ADMIN — URSODIOL 300 MILLIGRAM(S): 250 TABLET, FILM COATED ORAL at 18:35

## 2020-01-28 RX ADMIN — URSODIOL 300 MILLIGRAM(S): 250 TABLET, FILM COATED ORAL at 09:38

## 2020-01-28 RX ADMIN — Medication 250 MILLIGRAM(S): at 09:38

## 2020-01-28 RX ADMIN — MAGNESIUM OXIDE 400 MG ORAL TABLET 400 MILLIGRAM(S): 241.3 TABLET ORAL at 18:34

## 2020-01-28 RX ADMIN — TACROLIMUS 0.5 MILLIGRAM(S): 5 CAPSULE ORAL at 13:38

## 2020-01-28 RX ADMIN — FINASTERIDE 5 MILLIGRAM(S): 5 TABLET, FILM COATED ORAL at 13:38

## 2020-01-28 RX ADMIN — Medication 0.5 MILLIGRAM(S): at 22:41

## 2020-01-28 RX ADMIN — Medication 60 MILLIGRAM(S): at 00:05

## 2020-01-28 RX ADMIN — PIPERACILLIN AND TAZOBACTAM 25 GRAM(S): 4; .5 INJECTION, POWDER, LYOPHILIZED, FOR SOLUTION INTRAVENOUS at 04:25

## 2020-01-28 RX ADMIN — Medication 400 MILLIGRAM(S): at 13:37

## 2020-01-28 RX ADMIN — MAGNESIUM OXIDE 400 MG ORAL TABLET 400 MILLIGRAM(S): 241.3 TABLET ORAL at 09:38

## 2020-01-28 RX ADMIN — Medication 1 DROP(S): at 18:46

## 2020-01-28 RX ADMIN — Medication 1 TABLET(S): at 13:37

## 2020-01-28 RX ADMIN — Medication 1 DROP(S): at 22:42

## 2020-01-28 RX ADMIN — Medication 400 MILLIGRAM(S): at 06:05

## 2020-01-28 RX ADMIN — PANTOPRAZOLE SODIUM 40 MILLIGRAM(S): 20 TABLET, DELAYED RELEASE ORAL at 09:38

## 2020-01-28 RX ADMIN — Medication 1 MILLIGRAM(S): at 13:39

## 2020-01-28 RX ADMIN — ATOVAQUONE 750 MILLIGRAM(S): 750 SUSPENSION ORAL at 18:34

## 2020-01-28 RX ADMIN — SERTRALINE 50 MILLIGRAM(S): 25 TABLET, FILM COATED ORAL at 13:38

## 2020-01-28 RX ADMIN — Medication 400 MILLIGRAM(S): at 22:42

## 2020-01-28 NOTE — CONSULT NOTE ADULT - SUBJECTIVE AND OBJECTIVE BOX
All records reviewed.    HPI:  60 y/o man w hx multiple DVTs/PEs, dx'd acute leukemia 4/2019 at [facility], post  bone marrow transplant [facility] Dr DADA Gaytan 10/2019 from son as donor.  Adm w states two days dry cough and SOB, w T 102.   Influenza and RSV negative, CXR w patchy consolidation left lung  CBC w WBC 7range, plts 30'sk, Hgb 9.3 yesterday today 8.8.  Denies bleeds  On computer, platelets similar to past numbers post transplant, Hgb was 10range    PAST MEDICAL & SURGICAL HISTORY:  COLLEEN (obstructive sleep apnea)  Pulmonary embolism  Deep vein thrombosis (DVT)  H/O cardiomyopathy  Factor 5 Leiden mutation, heterozygous  No significant past surgical history: bone marrow transplant      Review of System:  cough, weakness, malaise  others see above    MEDICATIONS  (STANDING):  acyclovir   Oral Tab/Cap 400 milliGRAM(s) Oral every 8 hours  ALPRAZolam 0.5 milliGRAM(s) Oral at bedtime  atovaquone Suspension 750 milliGRAM(s) Oral two times a day  finasteride 5 milliGRAM(s) Oral daily  folic acid 1 milliGRAM(s) Oral daily  magnesium oxide 400 milliGRAM(s) Oral three times a day with meals  metoprolol succinate ER 12.5 milliGRAM(s) Oral daily  multivitamin 1 Tablet(s) Oral daily  pantoprazole    Tablet 40 milliGRAM(s) Oral before breakfast  piperacillin/tazobactam IVPB.. 3.375 Gram(s) IV Intermittent every 8 hours  sertraline 50 milliGRAM(s) Oral daily  tacrolimus 0.5 milliGRAM(s) Oral daily  tamsulosin 0.4 milliGRAM(s) Oral at bedtime  ursodiol Oral Tab/Cap - Peds 300 milliGRAM(s) Oral two times a day with meals  vancomycin  IVPB 1000 milliGRAM(s) IV Intermittent every 12 hours  voriconazole 200 milliGRAM(s) Oral every 12 hours    MEDICATIONS  (PRN):  albuterol/ipratropium for Nebulization 3 milliLiter(s) Nebulizer every 6 hours PRN Shortness of Breath and/or Wheezing      Allergies    No Known Allergies    Intolerances        SOCIAL HISTORY:  denies    FAMILY HISTORY:  not contributory      Vital Signs Last 24 Hrs  T(C): 37 (28 Jan 2020 11:01), Max: 37.5 (28 Jan 2020 06:04)  T(F): 98.6 (28 Jan 2020 11:01), Max: 99.5 (28 Jan 2020 06:04)  HR: 90 (28 Jan 2020 11:01) (90 - 112)  BP: 101/66 (28 Jan 2020 11:01) (101/66 - 119/83)  BP(mean): --  RR: 20 (28 Jan 2020 11:01) (18 - 20)  SpO2: 94% (28 Jan 2020 11:01) (91% - 96%)    PHYSICAL EXAM:      General:well developed well nourished, in no acute distress but mild discomfort  Eyes:sclera anicteric, pupils equal and reactive to light  ENMT:buccal mucosa moist, pharynx not injected  Neck:supple, trachea midline  Lungs:coarse breath sounds on  left  Cardiovascular:regular rate and rhythm, S1 S2  Abdomen:soft, nontender, no organomegaly present, bowel sounds normal  Neurological:alert and oriented x3, Cranial Nerves II-XII grossly intact  Skin:no increased ecchymosis/petechiae/purpura  Lymph Nodes:no palpable cervical/supraclavicular lymph nodes enlargements  Extremities:no cyanosis/clubbing/edema        LABS:                        8.8    7.73  )-----------( 35       ( 28 Jan 2020 00:25 )             27.1     01-28 @ 00:25  WBC7.73  RBC2.51 Hgb8.8 Hct27.1  KZF080.0  Plts35  Auto Lbibxl90.0 Band5.0 Auto Lymph28.0 Atypical Lymph-- Reactive Lymph-- Auto Mono4.0 Auto Eos1.0 Auto Baso0.0        01-27 @ 19:52  WBC7.37  RBC2.66 Hgb9.3 Hct28.8  CTO574.3  Plts37  Auto Loindg51.0 Band7.0 Auto Lymph32.0 Atypical Lymph-- Reactive Lymph3.0 Auto Mono9.0 Auto Eos0.0 Auto Baso0.0          01-28    137  |  108  |  22  ----------------------------<  145<H>  4.8   |  21<L>  |  1.10    Ca    8.7      28 Jan 2020 00:25    TPro  5.8<L>  /  Alb  3.0<L>  /  TBili  0.6  /  DBili  x   /  AST  13<L>  /  ALT  10<L>  /  AlkPhos  66  01-28 01-27 @ 19:52  PT13.9 INR1.22  PTT28.4        PERIPHERAL BLOOD SMEAR REVIEW:  RBC-normocytic, normochromic, no significant anisocytosis or poikilocytosis. No rouleaux/schistocytes or increased polychromasia.  WBC-normal in differential and morphology, no immature or abnormal cells seen.  Platelets-decreased on smear, no platelets clumping or giant platelets.       RADIOLOGY & ADDITIONAL STUDIES:  < from: Xray Chest 1 View-PORTABLE IMMEDIATE (01.27.20 @ 20:10) >    EXAM:  XR CHEST PORTABLE IMMED 1V                            PROCEDURE DATE:  01/27/2020          INTERPRETATION:  Chest portable.    Clinical History: Sepsis.    Comparison: 10/12/2019.    Single AP view submitted.    The evaluation of the cardiomediastinal silhouette is limited on portable technique.  There is a prominent cardiac silhouette.    There is left perihilar including upper and lower lung zone patchy airspace consolidation, which may reflect multilobar pneumonia.  Minimal blunting at the left costophrenic angle may represent trace pleural effusion.  Recommend follow-up PA and lateral view after the appropriate clinical course of treatment.    Impression:    Findings as discussed above.        < end of copied text >

## 2020-01-28 NOTE — PROGRESS NOTE ADULT - ASSESSMENT
Pt is s/p bone marrow transplant in October 2019. He states two days ago he developed cough. The cough was non-productive. The cough became progressively worse until today when he developed fever and difficulty breathing. He came to the ER because of his SOB. Pt. states he saw his bone marrow doctor (Dr. Gaytan [phone]), last week; he was told everything was ok.  history of multiple DVTs and PEs admitted possible pna with immunosuppression on venti mask .

## 2020-01-28 NOTE — DISCHARGE NOTE PROVIDER - HOSPITAL COURSE
HPI: Pt is s/p bone marrow transplant in October 2019. He states two days ago he developed cough. The cough was non-productive. The cough became progressively worse until today when he developed fever and difficulty breathing. He came to the ER because of his SOB. Pt. states he saw his bone marrow doctor (Dr. Gaytan [phone]), last week; he was told everything was ok. He reports a history of multiple DVTs and PEs, but he is off anti-coagulation due to low platelets.         Hospital course: admitted for acute respiratory distress 2/2 PNA, started on broad spec abx and oxygen support. Continued home antivirals/antifungals. Cardio (Christiana), Pulm (french), Heme(Sun) consulted. Pt with immunosuppression and nonischemic cardiomyopathy EF 24% possibly 2/2 to past chemo. Given level of severity, spoke with pt's heme/onc (Dr. Gaytan) who goes to [place], spoke with hospitalist at [place] agree for transfer to tertiary care center. Pt seen and examined day of discharge, medically optimized and stable for transfer to [place]. HPI: Pt is s/p bone marrow transplant in October 2019. He states two days ago he developed cough. The cough was non-productive. The cough became progressively worse until today when he developed fever and difficulty breathing. He came to the ER because of his SOB. Pt. states he saw his bone marrow doctor (Dr. Gaytan [phone]), last week; he was told everything was ok. He reports a history of multiple DVTs and PEs, but he is off anti-coagulation due to low platelets.         Hospital course: admitted for acute respiratory distress  immunosuppressed   acute hypoxic resp failure  acute  2/2 PNA,     Pt. s/p bone marrow transplant in October 2019. Continue IV vanco and zosyn. ICU consult called. 50% Ventimask     applied to maintain O2 above 92%. ID consult called (Dr. Michael).     Sepsis  poa , due to unspecified organism, unspecified whether acute organ dysfunction present  sec to pna    poa   on vanco  / zosyne  .   Pt's BP stabilized with 1 liter of fluid. Given h/o of cardiomyopathy with EF of 24% will be conservative with fluid resuscitation. Continue IV vanco and zosyn, and home antifungals and antivirals. H/O cardiomyopathy    sever systolic chf .   Gentle IVF hydration.     : Acute myeloid leukemia in remission.   on Continue home meds.    started on broad spec abx and oxygen support.).     Pulmonary embolism, unspecified chronicity, unspecified pulmonary embolism type, unspecified whether acute cor pulmonale present.     : Atrial flutter, unspecified type.  Plan: Defer anticoagulation for now.     Continued home antivirals/antifungals and  Cardio (Panritaa), Pulm (french), Heme(Sun) .     Pt with immunosuppression and nonischemic cardiomyopathy EF 24% possibly 2/2 to past chemo.       Given level of severity, spoke with pt's heme/onc (Dr. Gaytan) who goes to [place],     spoke with hospitalist at [place] agree for transfer to tertiary care center.     Pt seen and examined day of discharge, medically optimized and stable for transfer to [place].     pt accepted by dr syed llanes   hospitalist  [facility]   for tertiary level of care ,     pt bone marrow hem/onc dr christo scott  [facility]  transfer plan   .

## 2020-01-28 NOTE — PROGRESS NOTE ADULT - PROBLEM SELECTOR PLAN 2
Pt's BP stabilized with 1 liter of fluid. Given h/o of cardiomyopathy with EF of 24% will be conservative with fluid resuscitation. Contine IV vanco and zosyn, and home antifungals and antivirals. cardio pannella gp on case.

## 2020-01-28 NOTE — DISCHARGE NOTE PROVIDER - NSDCMRMEDTOKEN_GEN_ALL_CORE_FT
acyclovir 400 mg oral tablet: 1 tab(s) orally every 8 hours  ALPRAZolam 0.5 mg oral tablet: 1 tab(s) orally once a day (at bedtime)  atovaquone 750 mg/5 mL oral suspension: 5 milliliter(s) orally 2 times a day  finasteride 5 mg oral tablet: 1 tab(s) orally once a day  folic acid 1 mg oral tablet: 1 tab(s) orally once a day  ipratropium-albuterol 0.5 mg-2.5 mg/3 mLinhalation solution: 3 milliliter(s) inhaled every 6 hours, As needed, Shortness of Breath and/or Wheezing  magnesium oxide 400 mg (241.3 mg elemental magnesium) oral tablet: 1 tab(s) orally 3 times a day (with meals)  metoprolol succinate 25 mg oral tablet, extended release: 12.5 milligram(s) orally once a day  Multiple Vitamins oral tablet: 1 tab(s) orally once a day  pantoprazole 40 mg oral delayed release tablet: 1 tab(s) orally once a day (before a meal)  sertraline 50 mg oral tablet: 1 tab(s) orally once a day  tacrolimus 0.5 mg oral capsule: 1 cap(s) orally once a day  tamsulosin 0.4 mg oral capsule: 1 cap(s) orally once a day (at bedtime)  ursodiol 300 mg oral capsule: 1 cap(s) orally 2 times a day (with meals)  vancomycin 1 g intravenous injection: 1 gram(s) intravenous every 12 hours  voriconazole 200 mg oral tablet: 1 tab(s) orally every 12 hours acyclovir 400 mg oral tablet: 1 tab(s) orally every 8 hours  ALPRAZolam 0.5 mg oral tablet: 1 tab(s) orally once a day (at bedtime)  atovaquone 750 mg/5 mL oral suspension: 5 milliliter(s) orally 2 times a day  finasteride 5 mg oral tablet: 1 tab(s) orally once a day  folic acid 1 mg oral tablet: 1 tab(s) orally once a day  ipratropium-albuterol 0.5 mg-2.5 mg/3 mLinhalation solution: 3 milliliter(s) inhaled every 6 hours, As needed, Shortness of Breath and/or Wheezing  magnesium oxide 400 mg (241.3 mg elemental magnesium) oral tablet: 1 tab(s) orally 3 times a day (with meals)  metoprolol succinate 25 mg oral tablet, extended release: 12.5 milligram(s) orally once a day  Multiple Vitamins oral tablet: 1 tab(s) orally once a day  pantoprazole 40 mg oral delayed release tablet: 1 tab(s) orally once a day (before a meal)  piperacillin-tazobactam: 3.375 gram(s) intravenous every 8 hours  sertraline 50 mg oral tablet: 1 tab(s) orally once a day  tacrolimus 0.5 mg oral capsule: 1 cap(s) orally once a day  tamsulosin 0.4 mg oral capsule: 1 cap(s) orally once a day (at bedtime)  ursodiol 300 mg oral capsule: 1 cap(s) orally 2 times a day (with meals)  vancomycin 1 g intravenous injection: 1 gram(s) intravenous every 12 hours  voriconazole 200 mg oral tablet: 1 tab(s) orally every 12 hours

## 2020-01-28 NOTE — CONSULT NOTE ADULT - PROBLEM SELECTOR RECOMMENDATION 9
check blood cx  sputum cx  rvp   pt with is immune suppressed  ddx is broad-- but clinical course is consistent with community aquired pna  pt in not neutropenic  pt was on atovaquone pcp prophylaxis  chest ct  cont  vancomycin and zosyn --   monitor temp

## 2020-01-28 NOTE — ED ADULT NURSE REASSESSMENT NOTE - COMFORT CARE
darkened lights/side rails up/plan of care explained/repositioned
darkened lights/side rails up
darkened lights/side rails up
side rails up/darkened lights
plan of care explained/side rails up/wait time explained

## 2020-01-28 NOTE — ED ADULT NURSE REASSESSMENT NOTE - NS ED NURSE REASSESS COMMENT FT1
Pt. placed on hospital bed for comfort.
ICU PA present at bedside , per PA to hold IVF at this time.

## 2020-01-28 NOTE — DISCHARGE NOTE PROVIDER - NSDCFUSCHEDAPPT_GEN_ALL_CORE_FT
DI CONTRERAS IVANA ; 01/31/2020 ; [facility]  CHARLES CONTRERASENZO IVANA ; 02/05/2020 ; [facility]  DI CONTRERAS IVANA ; 02/14/2020 ; [facility]  DI CONTRERAS IVANA ; 02/21/2020 ; [facility]  DI CONTRERAS IVANA ; 02/26/2020 ; [facility] DI CONTRERAS IVANA ; 01/31/2020 ; [facility]  CHARLES CONTRERASENZO IVANA ; 02/05/2020 ; [facility]  DI CONTRERAS IVANA ; 02/14/2020 ; [facility]  DI CONTRERAS IVANA ; 02/21/2020 ; [facility]  DI CONTRERAS IVANA ; 02/26/2020 ; [facility]

## 2020-01-28 NOTE — DISCHARGE NOTE PROVIDER - NSDCCPCAREPLAN_GEN_ALL_CORE_FT
PRINCIPAL DISCHARGE DIAGNOSIS  Diagnosis: Pneumonia of left lower lobe due to infectious organism  Assessment and Plan of Treatment: Continune broad spectrum vanco/zosyn for now, follow up ID recs      SECONDARY DISCHARGE DIAGNOSES  Diagnosis: Immunosuppressed status  Assessment and Plan of Treatment: Continue tacrolimus  Continue home reigmen of antifungals/antivirals    Diagnosis: Nonischemic cardiomyopathy  Assessment and Plan of Treatment: With reduced EF 24%  Continue metoprolol, follow up cardio recs, will likely need to be maxed on HF medications/potential AICD placement    Diagnosis: Thrombocytopenia  Assessment and Plan of Treatment: 2/2 bone marrow failure, recent transplant back in oct 2019    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

## 2020-01-28 NOTE — DISCHARGE NOTE PROVIDER - CARE PROVIDER_API CALL
Rosina Gaytan)  Medical Oncology  [address] [place] [address]  Phone: [phone]  Fax: [phone]  Follow Up Time:

## 2020-01-28 NOTE — CONSULT NOTE ADULT - SUBJECTIVE AND OBJECTIVE BOX
[facility] - Ede Zamudio, Riki, Edgar, Gem, Tabitha Garcia  Office Number: [phone]    Initial Consult Note    CHIEF COMPLAINT: Patient is a 59y old  Male who presents with a chief complaint of cough      HPI:  Mr. Delong is a 59 year old male, with previously diagnosed acute myeloid leukemia. s/p induction chemotherapy with Daunorubicin/Cytarabine and  HIDAC consolidation chemotherapy, s/p  Haplo-identical stem cell transplant from son. Other history includes non-ischemic cardiomyopathy (recent EF 25% 9/19/19), COLLEEN, DVT/PE, HTN, Factor V Leiden and amputation of right toe due to osteomyelitis.    Pt is s/p bone marrow transplant in October 2019. He states two days ago he developed cough. The cough was non-productive. The cough became progressively worse until today when he developed fever and difficulty breathing. He came to the ER because of his SOB. Pt. states he saw his bone marrow doctor (Dr. Gaytan [phone]), last week; he was told everything was ok. He reports a history of multiple DVTs and PEs, but he is off anti-coagulation due to low platelets.    Cardiology consulted for       PAST MEDICAL & SURGICAL HISTORY:  COLLEEN (obstructive sleep apnea)  Pulmonary embolism  Deep vein thrombosis (DVT)  H/O cardiomyopathy  Factor 5 Leiden mutation, heterozygous  No significant past surgical history: bone marrow transplant      SOCIAL HISTORY:  No tobacco, ethanol, or drug abuse.    FAMILY HISTORY:    No family history of acute MI or sudden cardiac death.    MEDICATIONS  (STANDING):  acyclovir   Oral Tab/Cap 400 milliGRAM(s) Oral every 8 hours  ALPRAZolam 0.5 milliGRAM(s) Oral at bedtime  atovaquone Suspension 750 milliGRAM(s) Oral two times a day  finasteride 5 milliGRAM(s) Oral daily  folic acid 1 milliGRAM(s) Oral daily  magnesium oxide 400 milliGRAM(s) Oral three times a day with meals  metoprolol succinate ER 12.5 milliGRAM(s) Oral daily  multivitamin 1 Tablet(s) Oral daily  pantoprazole    Tablet 40 milliGRAM(s) Oral before breakfast  piperacillin/tazobactam IVPB.. 3.375 Gram(s) IV Intermittent every 8 hours  sertraline 50 milliGRAM(s) Oral daily  tacrolimus 0.5 milliGRAM(s) Oral daily  tamsulosin 0.4 milliGRAM(s) Oral at bedtime  ursodiol Oral Tab/Cap - Peds 300 milliGRAM(s) Oral two times a day with meals  vancomycin  IVPB 1000 milliGRAM(s) IV Intermittent every 12 hours  voriconazole 200 milliGRAM(s) Oral every 12 hours    MEDICATIONS  (PRN):  albuterol/ipratropium for Nebulization 3 milliLiter(s) Nebulizer every 6 hours PRN Shortness of Breath and/or Wheezing      Allergies    No Known Allergies    Intolerances        REVIEW OF SYSTEMS:    CONSTITUTIONAL: No weakness, fevers or chills  EYES/ENT: No visual changes;  No vertigo or throat pain   NECK: No pain or stiffness  RESPIRATORY: No cough, wheezing, hemoptysis; No shortness of breath  CARDIOVASCULAR: No chest pain or palpitations  GASTROINTESTINAL: No abdominal pain. No nausea, vomiting, or hematemesis; No diarrhea or constipation. No melena or hematochezia.  GENITOURINARY: No dysuria, frequency or hematuria  NEUROLOGICAL: No numbness or weakness  SKIN: No itching or rash  All other review of systems is negative unless indicated above    VITAL SIGNS:   Vital Signs Last 24 Hrs  T(C): 37.5 (28 Jan 2020 06:04), Max: 37.5 (28 Jan 2020 06:04)  T(F): 99.5 (28 Jan 2020 06:04), Max: 99.5 (28 Jan 2020 06:04)  HR: 96 (28 Jan 2020 06:04) (96 - 112)  BP: 108/66 (28 Jan 2020 06:04) (105/75 - 119/83)  BP(mean): --  RR: 18 (28 Jan 2020 06:04) (18 - 20)  SpO2: 94% (28 Jan 2020 06:04) (91% - 96%)    I&O's Summary    27 Jan 2020 07:01  -  28 Jan 2020 07:00  --------------------------------------------------------  IN: 0 mL / OUT: 1200 mL / NET: -1200 mL        On Exam:    Constitutional: NAD, alert and oriented x 3  Lungs:  Non-labored, breath sounds are clear bilaterally, No wheezing, rales or rhonchi  Cardiovascular: RRR.  S1 and S2 positive.  No murmurs, rubs, gallops or clicks  Gastrointestinal: Bowel Sounds present, soft, nontender.   Lymph: No peripheral edema. No cervical lymphadenopathy.  Neurological: Alert, no focal deficits  Skin: No rashes or ulcers   Psych:  Mood & affect appropriate.    LABS: All Labs Reviewed:                        8.8    7.73  )-----------( 35       ( 28 Jan 2020 00:25 )             27.1                         9.3    7.37  )-----------( 37       ( 27 Jan 2020 19:52 )             28.8     28 Jan 2020 00:25    137    |  108    |  22     ----------------------------<  145    4.8     |  21     |  1.10   27 Jan 2020 19:52    138    |  107    |  23     ----------------------------<  148    5.0     |  24     |  1.20     Ca    8.7        28 Jan 2020 00:25  Ca    9.1        27 Jan 2020 19:52    TPro  5.8    /  Alb  3.0    /  TBili  0.6    /  DBili  x      /  AST  13     /  ALT  10     /  AlkPhos  66     28 Jan 2020 00:25  TPro  6.2    /  Alb  3.3    /  TBili  0.6    /  DBili  x      /  AST  16     /  ALT  11     /  AlkPhos  70     27 Jan 2020 19:52    PT/INR - ( 27 Jan 2020 19:52 )   PT: 13.9 sec;   INR: 1.22 ratio         PTT - ( 27 Jan 2020 19:52 )  PTT:28.4 sec      Blood Culture:         RADIOLOGY:    EKG:    Assessment/Plan:  59y Male [facility] - Ede Zamudio, Riki, Edgar, Gem, Tabitha Garcia  Office Number: [phone]    Initial Consult Note    CHIEF COMPLAINT: Patient is a 59y old  Male who presents with a chief complaint of cough      HPI:  Mr. Delong is a 59 year old male, with previously diagnosed acute myeloid leukemia. s/p induction chemotherapy with Daunorubicin/Cytarabine and  HIDAC consolidation chemotherapy, s/p  Haplo-identical stem cell transplant from son. Other history includes non-ischemic cardiomyopathy (recent EF 25% 9/19/19), COLLEEN, DVT/PE, HTN, Factor V Leiden and amputation of right toe due to osteomyelitis.    Pt is s/p bone marrow transplant in October 2019. He states two days ago he developed cough. The cough was non-productive. The cough became progressively worse until today when he developed fever and difficulty breathing. He came to the ER because of his SOB. Pt. states he saw his bone marrow doctor (Dr. Gaytan [phone]), last week; he was told everything was ok. He reports a history of multiple DVTs and PEs, but he is off anti-coagulation due to low platelets.    Cardiology consulted for h/o of cardiomyopathy with EF of 24%. Patient states he is doing better, cough has improved. Patient on venti-mask, and saturating well. Patient denies chest pain and SOB. Patient follows with Dr. Lyn, was last seen on 1/6/20. At the time EKG showed SR RBBB. Echo on 7/19 showed moderate LV dysfunction, no pHTN, normal LA size, no MR, LVEF 35-40%. Cardiac cath in 2010 was normal. Cardiac MRI in 9/2019 showed EF 25% non ischemic cardiomyopathy. Patient is a former smoker, denies etoh and drug use. Ambulates without assistance.       PAST MEDICAL & SURGICAL HISTORY:  COLLEEN (obstructive sleep apnea)  Pulmonary embolism  Deep vein thrombosis (DVT)  H/O cardiomyopathy  Factor 5 Leiden mutation, heterozygous  No significant past surgical history: bone marrow transplant      SOCIAL HISTORY:  Former smoker, denies ethanol, or drug abuse.    FAMILY HISTORY:  No family history of acute MI or sudden cardiac death.    MEDICATIONS  (STANDING):  acyclovir   Oral Tab/Cap 400 milliGRAM(s) Oral every 8 hours  ALPRAZolam 0.5 milliGRAM(s) Oral at bedtime  atovaquone Suspension 750 milliGRAM(s) Oral two times a day  finasteride 5 milliGRAM(s) Oral daily  folic acid 1 milliGRAM(s) Oral daily  magnesium oxide 400 milliGRAM(s) Oral three times a day with meals  metoprolol succinate ER 12.5 milliGRAM(s) Oral daily  multivitamin 1 Tablet(s) Oral daily  pantoprazole    Tablet 40 milliGRAM(s) Oral before breakfast  piperacillin/tazobactam IVPB.. 3.375 Gram(s) IV Intermittent every 8 hours  sertraline 50 milliGRAM(s) Oral daily  tacrolimus 0.5 milliGRAM(s) Oral daily  tamsulosin 0.4 milliGRAM(s) Oral at bedtime  ursodiol Oral Tab/Cap - Peds 300 milliGRAM(s) Oral two times a day with meals  vancomycin  IVPB 1000 milliGRAM(s) IV Intermittent every 12 hours  voriconazole 200 milliGRAM(s) Oral every 12 hours    MEDICATIONS  (PRN):  albuterol/ipratropium for Nebulization 3 milliLiter(s) Nebulizer every 6 hours PRN Shortness of Breath and/or Wheezing      Allergies    No Known Allergies    Intolerances        REVIEW OF SYSTEMS:  CONSTITUTIONAL: No weakness, fevers or chills  RESPIRATORY: Admits improved cough, denies wheezing, hemoptysis; No shortness of breath  CARDIOVASCULAR: No chest pain or palpitations  GASTROINTESTINAL: No abdominal pain. No nausea, vomiting, or hematemesis; No diarrhea or constipation. No melena or hematochezia.  GENITOURINARY: No dysuria, frequency or hematuria  NEUROLOGICAL: No numbness or weakness      VITAL SIGNS:   Vital Signs Last 24 Hrs  T(C): 37.5 (28 Jan 2020 06:04), Max: 37.5 (28 Jan 2020 06:04)  T(F): 99.5 (28 Jan 2020 06:04), Max: 99.5 (28 Jan 2020 06:04)  HR: 96 (28 Jan 2020 06:04) (96 - 112)  BP: 108/66 (28 Jan 2020 06:04) (105/75 - 119/83)  BP(mean): --  RR: 18 (28 Jan 2020 06:04) (18 - 20)  SpO2: 94% (28 Jan 2020 06:04) (91% - 96%)    I&O's Summary    27 Jan 2020 07:01  -  28 Jan 2020 07:00  --------------------------------------------------------  IN: 0 mL / OUT: 1200 mL / NET: -1200 mL        On Exam:  Constitutional: NAD, sleepy but arousable  Lungs:  Non-labored, breath sounds are clear bilaterally, No wheezing, rales or rhonchi. On venti mask  Cardiovascular: RRR.  S1 and S2 positive.  No murmurs, rubs, gallops or clicks  Gastrointestinal: Bowel Sounds present, soft, nontender.   Lymph: No peripheral edema.  Neurological: sleepy but arousable, no focal deficits    LABS: All Labs Reviewed:                        8.8    7.73  )-----------( 35       ( 28 Jan 2020 00:25 )             27.1                         9.3    7.37  )-----------( 37       ( 27 Jan 2020 19:52 )             28.8     28 Jan 2020 00:25    137    |  108    |  22     ----------------------------<  145    4.8     |  21     |  1.10   27 Jan 2020 19:52    138    |  107    |  23     ----------------------------<  148    5.0     |  24     |  1.20     Ca    8.7        28 Jan 2020 00:25  Ca    9.1        27 Jan 2020 19:52    TPro  5.8    /  Alb  3.0    /  TBili  0.6    /  DBili  x      /  AST  13     /  ALT  10     /  AlkPhos  66     28 Jan 2020 00:25  TPro  6.2    /  Alb  3.3    /  TBili  0.6    /  DBili  x      /  AST  16     /  ALT  11     /  AlkPhos  70     27 Jan 2020 19:52    PT/INR - ( 27 Jan 2020 19:52 )   PT: 13.9 sec;   INR: 1.22 ratio         PTT - ( 27 Jan 2020 19:52 )  PTT:28.4 sec      Blood Culture:

## 2020-01-28 NOTE — PROGRESS NOTE ADULT - SUBJECTIVE AND OBJECTIVE BOX
Patient is a 59y old  Male who presents with a chief complaint of Pneumonia (28 Jan 2020 11:26)      HPI:  Pt is s/p bone marrow transplant in October 2019. He states two days ago he developed cough. The cough was non-productive. The cough became progressively worse until today when he developed fever and difficulty breathing. He came to the ER because of his SOB. Pt. states he saw his bone marrow doctor (Dr. Gaytan [phone]), last week; he was told everything was ok. He reports a history of multiple DVTs and PEs, but he is off anti-coagulation due to low platelettes. (27 Jan 2020 21:20)   admitted  with  pna  sepsis poa  with immunosuppression on venti mask .    pt seen and examine  today- alert awake   on venti mask sob , ,  cough dry + , no fever  , no cp.     INTERVAL HPI/OVERNIGHT EVENTS:  T(C): 37 (01-28-20 @ 11:01), Max: 37.5 (01-28-20 @ 06:04)  HR: 90 (01-28-20 @ 11:01) (90 - 112)  BP: 101/66 (01-28-20 @ 11:01) (101/66 - 119/83)  RR: 20 (01-28-20 @ 11:01) (18 - 20)  SpO2: 94% (01-28-20 @ 11:01) (91% - 96%)  Wt(kg): --  I&O's Summary    27 Jan 2020 07:01  -  28 Jan 2020 07:00  --------------------------------------------------------  IN: 0 mL / OUT: 1200 mL / NET: -1200 mL        REVIEW OF SYSTEMS:  CONSTITUTIONAL: No fever, weight loss,  +  fatigue  EYES: No eye pain, visual disturbances, or discharge  ENMT:   No sinus or throat pain  NECK: No pain or stiffness  BREASTS: No pain, no masses,   RESPIRATORY:  +  cough, wheezing, chills   +     shortness of breath  CARDIOVASCULAR: No chest pain, palpitations, dizziness, or leg swelling  GASTROINTESTINAL: No abdominal or epigastric pain. No nausea, vomiting, No diarrhea or constipation. No melena   GENITOURINARY: No dysuria, frequency, hematuria, or incontinence  NEUROLOGICAL: No headaches, memory loss, loss of strength, numbness,   SKIN: No itching, burning, rashes, or lesions   MUSCULOSKELETAL: No joint pain or swelling; No muscle, back, or extremity pain      PHYSICAL EXAM:  GENERAL: acute resp distress on venti mask +   HEAD:  Atraumatic, Normocephalic  EYES: EOMI, PERRLA, conjunctiva and sclera clear  ENMT: ; Moist mucous membranes,  No lesions  NECK: Supple, No JVD,   NERVOUS SYSTEM:  Alert & Oriented X3, Good concentration;   Motor Strength 5/5 B/L upper and lower extremities; DTRs 2+ intact and symmetric  CHEST/LUNG bilaterally  decreased  coarse  ; No rales, rhonchi, wheezing,   HEART: Regular rate and rhythm; No murmurs, no tachy   ABDOMEN: Soft, Nontender, Nondistended; Bowel sounds present  EXTREMITIES:  2+ Peripheral Pulses, No clubbing, cyanosis, or edema  SKIN: No rashes or lesions    MEDICATIONS  (STANDING):  acyclovir   Oral Tab/Cap 400 milliGRAM(s) Oral every 8 hours  ALPRAZolam 0.5 milliGRAM(s) Oral at bedtime  atovaquone Suspension 750 milliGRAM(s) Oral two times a day  finasteride 5 milliGRAM(s) Oral daily  folic acid 1 milliGRAM(s) Oral daily  magnesium oxide 400 milliGRAM(s) Oral three times a day with meals  metoprolol succinate ER 12.5 milliGRAM(s) Oral daily  multivitamin 1 Tablet(s) Oral daily  pantoprazole    Tablet 40 milliGRAM(s) Oral before breakfast  piperacillin/tazobactam IVPB.. 3.375 Gram(s) IV Intermittent every 8 hours  sertraline 50 milliGRAM(s) Oral daily  tacrolimus 0.5 milliGRAM(s) Oral daily  tamsulosin 0.4 milliGRAM(s) Oral at bedtime  ursodiol Oral Tab/Cap - Peds 300 milliGRAM(s) Oral two times a day with meals  vancomycin  IVPB 1000 milliGRAM(s) IV Intermittent every 12 hours  voriconazole 200 milliGRAM(s) Oral every 12 hours    MEDICATIONS  (PRN):  albuterol/ipratropium for Nebulization 3 milliLiter(s) Nebulizer every 6 hours PRN Shortness of Breath and/or Wheezing      LABS:                        8.8    7.73  )-----------( 35       ( 28 Jan 2020 00:25 )             27.1     01-28    137  |  108  |  22  ----------------------------<  145<H>  4.8   |  21<L>  |  1.10    Ca    8.7      28 Jan 2020 00:25    TPro  5.8<L>  /  Alb  3.0<L>  /  TBili  0.6  /  DBili  x   /  AST  13<L>  /  ALT  10<L>  /  AlkPhos  66  01-28    PT/INR - ( 27 Jan 2020 19:52 )   PT: 13.9 sec;   INR: 1.22 ratio         PTT - ( 27 Jan 2020 19:52 )  PTT:28.4 sec                      RADIOLOGY & ADDITIONAL TESTS:    Imaging Personally Reviewed:     no new test   Advance Directives:  full code

## 2020-01-28 NOTE — PROVIDER CONTACT NOTE (OTHER) - ACTION/TREATMENT ORDERED:
Dr. Hernández will come to bedside.  I also called Dr. Costello and left a voicemail Dr. Hernández will come to bedside.  Obtain UA  I also called Dr. Costello and left a voicemail

## 2020-01-28 NOTE — CONSULT NOTE ADULT - SUBJECTIVE AND OBJECTIVE BOX
[facility], Division of Infectious Diseases  KIM Espana A. Lee  [phone]    DI CONTRERAS  59y, Male  [id number]      HPI:  59M with h/o mayo, factor 5, dvt, PE, osteomyelitis, asperigillous cutaneous infection,  AML s/p bone marrow transplant from  son on 10/2019.  Seen at hematology weekly.  States he was fine at his last appt, but then about 2/3 days of cough.  States dry nonproductive, + rhinorrhea, then yesterday developed fever with dyspnea.  no nausea, no vomiting, no diarrhea, no dysuria  Denies sick contacts. no recent antibx, no recent travel  has 2 cats at home.          PMH/PSH--  MAYO (obstructive sleep apnea)  Pulmonary embolism  Deep vein thrombosis (DVT)  H/O cardiomyopathy  Factor 5 Leiden mutation, heterozygous  osteomyelitis-- IV ceftriaxone  right toe amputation      Allergies--NKDA      Medications--  Antibiotics: acyclovir   Oral Tab/Cap 400 milliGRAM(s) Oral every 8 hours  atovaquone Suspension 750 milliGRAM(s) Oral two times a day  piperacillin/tazobactam IVPB.. 3.375 Gram(s) IV Intermittent every 8 hours  vancomycin  IVPB 1000 milliGRAM(s) IV Intermittent every 12 hours  voriconazole 200 milliGRAM(s) Oral every 12 hours    Immunologic: tacrolimus 0.5 milliGRAM(s) Oral daily    Other: albuterol/ipratropium for Nebulization PRN  ALPRAZolam  finasteride  folic acid  magnesium oxide  metoprolol succinate ER  multivitamin  pantoprazole    Tablet  sertraline  tamsulosin  ursodiol Oral Tab/Cap - Peds      Social History--  EtOH: formet  Tobacco: former  Drug Use: denies ***    Family/Marital History--    lives with significant other  4 children  family history of blood disorder    Travel/Environmental/Occupational History:  has not worked since 4/2019  was a     Review of Systems:  A >=10-point review of systems was obtained.     Pertinent positives and negatives--  Constitutional: + fevers. No Chills. No Rigors.   Eyes: no blurry vision  ENMT: + rhinorrhea, no sore throat  Cardiovascular: No chest pain. No palpitations.  Respiratory: + shortness of breath. +cough.  Gastrointestinal: No nausea or vomiting. No diarrhea or constipation.   Genitourinary: no frequency  Musculoskeletal: + weakness  Skin: no rash  Neurologic: no headache  Psychiatric: no anxiety    Review of systems otherwise negative except as previously noted.    Physical Exam--  Vital Signs: T(F): 99.5 (01-28-20 @ 06:04), Max: 99.5 (01-28-20 @ 06:04)  HR: 96 (01-28-20 @ 06:04)  BP: 108/66 (01-28-20 @ 06:04)  RR: 18 (01-28-20 @ 06:04)  SpO2: 94% (01-28-20 @ 06:04)  Wt(kg): --  General: + resp distress  HEENT: AT/NC. P Anicteric. +FM  Neck: Not rigid. No sense of mass.  Nodes: None palpable.  Lungs: Clear bilaterally without rales, wheezing or rhonchi  Heart: Regular rate and rhythm. No Murmur.   Abdomen: Bowel sounds present and normoactive. Soft. Nondistended. Nontender.  Back: No spinal tenderness. No costovertebral angle tenderness.   Extremities: No cyanosis or clubbing. No edema.   Skin: Warm. Dry. Good turgor. No rash. No vasculitic stigmata.  Psychiatric: Appropriate affect and mood for situation.         Laboratory & Imaging Data--  CBC                        8.8    7.73  )-----------( 35       ( 28 Jan 2020 00:25 )             27.1       Chemistries  01-28    137  |  108  |  22  ----------------------------<  145<H>  4.8   |  21<L>  |  1.10    Ca    8.7      28 Jan 2020 00:25    TPro  5.8<L>  /  Alb  3.0<L>  /  TBili  0.6  /  DBili  x   /  AST  13<L>  /  ALT  10<L>  /  AlkPhos  66  01-28      Culture Data    FLU A B RSV Detection by PCR (01.27.20 @ 19:52)    Flu A Result: NotDetec: The Flu A B RSV assay is a Real-Time PCR test for the qualitative  detection and differentiation of Influenza A, Influenza B, and  Respiratory Syncytial Virus on nasopharyngeal swabs. The results should  be interpreted in the context of all clinical and laboratory findings.    Flu B Result: NotDetec    RSV Result: NotDetec    1< from: Xray Chest 1 View-PORTABLE IMMEDIATE (01.27.20 @ 20:10) >  EXAM:  XR CHEST PORTABLE IMMED 1V                            PROCEDURE DATE:  01/27/2020          INTERPRETATION:  Chest portable.    Clinical History: Sepsis.    Comparison: 10/12/2019.    Single AP view submitted.    The evaluation of the cardiomediastinal silhouette is limited on portable technique.  There is a prominent cardiac silhouette.    There is left perihilar including upper and lower lung zone patchy airspace consolidation, which may reflect multilobar pneumonia.  Minimal blunting at the left costophrenic angle may represent trace pleural effusion.  Recommend follow-up PA and lateral view after the appropriate clinical course of treatment.    Impression:    Findings as discussed above.      < end of copied text >

## 2020-01-28 NOTE — CONSULT NOTE ADULT - ASSESSMENT
58 y/o man w hx multiple DVT/PEs, post allogeneic bone marrow transplant for acute leukemia 10/2019 at [facility] Dr DADA Gaytan, on immunosuppressive medications and acyclovir, voriconazole.   Adm w 2 days of cough, SOB, fever up to 102, Influenza and RSV in ER negative, CXR left lung patchy consolidations, started on Vanco, Pip/tazo.  CBC w anemia and thrombocytopenia    -thrombocytopenia- similar to numbers since transplant, reflect bone marrow transplant not yet fully recovered No acute bleeds. No need for transfusion  -anemia-worse then the Hgb 10range since transplant, likely due to acute illness w still compromised marrow reserve, poor response during stress.   -no sig overt pathology seen on jessica blood smear review.  -continue follow serial CBC, transfuse as clinically indicated pending repeat labs  -pneumonia-continues antibiotics  -pt scheduled for transfer to [facility], will be followed by his Heme/Onc Dr Gaytan team et al once transferred    Thank you, please call if any questions

## 2020-01-28 NOTE — CONSULT NOTE ADULT - ASSESSMENT
Mr. Delong is a 59 year old male, with previously diagnosed acute myeloid leukemia. s/p induction chemotherapy with Daunorubicin/Cytarabine and  HIDAC consolidation chemotherapy, s/p  Haplo-identical stem cell transplant from son. Other history includes non-ischemic cardiomyopathy (recent EF 25% 9/19/19), COLLEEN, DVT/PE, HTN, Factor V Leiden and amputation of right toe due to osteomyelitis. Admitted for pneumonia.    - Current EF likely 2/2 chemotheraphy  - No evidence of volume overload at present.     - Continue to maintain strict I's and O's, and to monitor for signs of volume overload, which he is at risk for.    - No evidence of acute ischemia  - BP is overall stable  - Continue BB at current doses as tolerated by BP.    - Hx of dvt/pe but can not be AC at this time 2/2 low platelets  - Monitor and replete lytes  - To follow while admitted    - Other cardiovascular workup will depend on clinical course.  - All other workup per primary team.  - Will continue to follow. Mr. Delong is a 59 year old male, with previously diagnosed acute myeloid leukemia. s/p induction chemotherapy with Daunorubicin/Cytarabine and  HIDAC consolidation chemotherapy, s/p  Haplo-identical stem cell transplant from son. Other history includes non-ischemic cardiomyopathy (recent EF 25% 9/19/19), COLLEEN, DVT/PE, HTN, Factor V Leiden and amputation of right toe due to osteomyelitis. Admitted for pneumonia.    - Current EF likely 2/2 chemotheraphy  - No evidence of volume overload at present.     - Continue to maintain strict I's and O's, and to monitor for signs of volume overload, which he is at risk for.    - No evidence of acute ischemia  - BP is overall stable  - Continue BB at current doses as tolerated by BP.    - Hx of dvt/pe but can not be AC at this time 2/2 low platelets  - Monitor and replete lytes  - To follow while admitted  - Other cardiovascular workup will depend on clinical course.  - All other workup per primary team. Mr. Delong is a 59 year old male, with previously diagnosed acute myeloid leukemia. s/p induction chemotherapy with Daunorubicin/Cytarabine and  HIDAC consolidation chemotherapy, s/p  Haplo-identical stem cell transplant from son. Other history includes non-ischemic cardiomyopathy (recent EF 25% 9/19/19), COLLEEN, DVT/PE, HTN, Factor V Leiden and amputation of right toe due to osteomyelitis. Admitted for pneumonia.    - Current EF likely 2/2 chemotheraphy  - No evidence of volume overload at present.     - Admission EKG sinus tachy with frequent PVC's  - EKG repeated today sinus with PVC's, LAD  - Continue to maintain strict I's and O's, and to monitor for signs of volume overload, which he is at risk for.    - No evidence of acute ischemia  - BP is overall stable but has been running low  - Continue BB at current doses as tolerated by BP.    - Hx of dvt/pe but can not be AC at this time 2/2 low platelets  - Monitor and replete lytes  - To follow while admitted  - Other cardiovascular workup will depend on clinical course.  - All other workup per primary team. Mr. Delong is a 59 year old male, with previously diagnosed acute myeloid leukemia. s/p induction chemotherapy with Daunorubicin/Cytarabine and  HIDAC consolidation chemotherapy, s/p  Haplo-identical stem cell transplant from son. Other history includes non-ischemic cardiomyopathy (recent EF 25% 9/19/19), COLLEEN, DVT/PE, HTN, Factor V Leiden and amputation of right toe due to osteomyelitis. Admitted for pneumonia.    - Current LV dysfunction likely 2/2 chemotheraphy  - No evidence of volume overload at present.     - Admission EKG sinus tachy with frequent PVC's  - EKG repeated today sinus with PVC's, LAD  - Continue to maintain strict I's and O's, and to monitor for signs of volume overload, which he is at risk for.    - No evidence of acute ischemia  - BP is overall stable but has been running low  - Continue BB at current doses as tolerated by BP.    - Hx of dvt/pe but can not be AC at this time 2/2 low platelets  - Monitor and replete lytes  - To follow while admitted  - Other cardiovascular workup will depend on clinical course.  - All other workup per primary team.

## 2020-01-28 NOTE — PROGRESS NOTE ADULT - PROBLEM SELECTOR PLAN 1
sepsis  poa   immuno suppressed  Pt. s/p bone marrow transplant in October 2019. Continue IV vanco  q12 ,  zosyne 3.35 gnm q8hr. ICU consult called. 50% ventimask applied to maintain O2 above 92%. ID consult called (Dr. Michael). dr ellis scott bone marrow transplant unit doctor aware with  tt plan  and agree transfer plan  .

## 2020-01-29 ENCOUNTER — INPATIENT (INPATIENT)
Facility: HOSPITAL | Age: 60
LOS: 2 days | Discharge: HOME | DRG: 194 | End: 2020-02-01
Attending: HOSPITALIST | Admitting: STUDENT IN AN ORGANIZED HEALTH CARE EDUCATION/TRAINING PROGRAM
Payer: COMMERCIAL

## 2020-01-29 ENCOUNTER — TRANSCRIPTION ENCOUNTER (OUTPATIENT)
Age: 60
End: 2020-01-29

## 2020-01-29 VITALS
HEART RATE: 101 BPM | TEMPERATURE: 98 F | SYSTOLIC BLOOD PRESSURE: 134 MMHG | OXYGEN SATURATION: 92 % | DIASTOLIC BLOOD PRESSURE: 74 MMHG | RESPIRATION RATE: 18 BRPM

## 2020-01-29 VITALS
OXYGEN SATURATION: 91 % | SYSTOLIC BLOOD PRESSURE: 124 MMHG | RESPIRATION RATE: 18 BRPM | HEIGHT: 73 IN | WEIGHT: 186.29 LBS | HEART RATE: 97 BPM | TEMPERATURE: 99 F | DIASTOLIC BLOOD PRESSURE: 77 MMHG

## 2020-01-29 DIAGNOSIS — D69.6 THROMBOCYTOPENIA, UNSPECIFIED: ICD-10-CM

## 2020-01-29 DIAGNOSIS — J18.9 PNEUMONIA, UNSPECIFIED ORGANISM: ICD-10-CM

## 2020-01-29 DIAGNOSIS — I26.99 OTHER PULMONARY EMBOLISM WITHOUT ACUTE COR PULMONALE: ICD-10-CM

## 2020-01-29 DIAGNOSIS — Z29.9 ENCOUNTER FOR PROPHYLACTIC MEASURES, UNSPECIFIED: ICD-10-CM

## 2020-01-29 DIAGNOSIS — J15.9 UNSPECIFIED BACTERIAL PNEUMONIA: ICD-10-CM

## 2020-01-29 DIAGNOSIS — D64.9 ANEMIA, UNSPECIFIED: ICD-10-CM

## 2020-01-29 DIAGNOSIS — C92.01 ACUTE MYELOBLASTIC LEUKEMIA, IN REMISSION: ICD-10-CM

## 2020-01-29 DIAGNOSIS — I50.22 CHRONIC SYSTOLIC (CONGESTIVE) HEART FAILURE: ICD-10-CM

## 2020-01-29 DIAGNOSIS — D68.51 ACTIVATED PROTEIN C RESISTANCE: ICD-10-CM

## 2020-01-29 LAB
ANION GAP SERPL CALC-SCNC: 8 MMOL/L — SIGNIFICANT CHANGE UP (ref 5–17)
BASOPHILS # BLD AUTO: 0.01 K/UL — SIGNIFICANT CHANGE UP (ref 0–0.2)
BASOPHILS NFR BLD AUTO: 0.2 % — SIGNIFICANT CHANGE UP (ref 0–2)
BUN SERPL-MCNC: 23 MG/DL — SIGNIFICANT CHANGE UP (ref 7–23)
CALCIUM SERPL-MCNC: 8.4 MG/DL — LOW (ref 8.5–10.1)
CHLORIDE SERPL-SCNC: 104 MMOL/L — SIGNIFICANT CHANGE UP (ref 96–108)
CMV DNA CSF QL NAA+PROBE: SIGNIFICANT CHANGE UP
CMV DNA SPEC NAA+PROBE-LOG#: SIGNIFICANT CHANGE UP LOG10IU/ML
CO2 SERPL-SCNC: 24 MMOL/L — SIGNIFICANT CHANGE UP (ref 22–31)
CREAT SERPL-MCNC: 1 MG/DL — SIGNIFICANT CHANGE UP (ref 0.5–1.3)
CULTURE RESULTS: NO GROWTH — SIGNIFICANT CHANGE UP
ENGRAFTMNET-POST: NORMAL
EOSINOPHIL # BLD AUTO: 0.16 K/UL — SIGNIFICANT CHANGE UP (ref 0–0.5)
EOSINOPHIL NFR BLD AUTO: 2.8 % — SIGNIFICANT CHANGE UP (ref 0–6)
GLUCOSE SERPL-MCNC: 113 MG/DL — HIGH (ref 70–99)
HCT VFR BLD CALC: 26.7 % — LOW (ref 39–50)
HCT VFR BLD CALC: 30.3 % — LOW (ref 39–50)
HGB BLD-MCNC: 8.8 G/DL — LOW (ref 13–17)
HGB BLD-MCNC: 9.6 G/DL — LOW (ref 13–17)
IMM GRANULOCYTES NFR BLD AUTO: 6.7 % — HIGH (ref 0–1.5)
LYMPHOCYTES # BLD AUTO: 1.37 K/UL — SIGNIFICANT CHANGE UP (ref 1–3.3)
LYMPHOCYTES # BLD AUTO: 24.2 % — SIGNIFICANT CHANGE UP (ref 13–44)
MCHC RBC-ENTMCNC: 31.7 GM/DL — LOW (ref 32–36)
MCHC RBC-ENTMCNC: 33 GM/DL — SIGNIFICANT CHANGE UP (ref 32–36)
MCHC RBC-ENTMCNC: 34.5 PG — HIGH (ref 27–34)
MCHC RBC-ENTMCNC: 35.8 PG — HIGH (ref 27–34)
MCV RBC AUTO: 108.5 FL — HIGH (ref 80–100)
MCV RBC AUTO: 109 FL — HIGH (ref 80–100)
MONOCYTES # BLD AUTO: 0.43 K/UL — SIGNIFICANT CHANGE UP (ref 0–0.9)
MONOCYTES NFR BLD AUTO: 7.6 % — SIGNIFICANT CHANGE UP (ref 2–14)
NEUTROPHILS # BLD AUTO: 3.31 K/UL — SIGNIFICANT CHANGE UP (ref 1.8–7.4)
NEUTROPHILS NFR BLD AUTO: 58.5 % — SIGNIFICANT CHANGE UP (ref 43–77)
NRBC # BLD: 0 /100 WBCS — SIGNIFICANT CHANGE UP (ref 0–0)
NRBC # BLD: 0 /100 WBCS — SIGNIFICANT CHANGE UP (ref 0–0)
PLATELET # BLD AUTO: 36 K/UL — LOW (ref 150–400)
PLATELET # BLD AUTO: 40 K/UL — LOW (ref 150–400)
POTASSIUM SERPL-MCNC: 4 MMOL/L — SIGNIFICANT CHANGE UP (ref 3.5–5.3)
POTASSIUM SERPL-SCNC: 4 MMOL/L — SIGNIFICANT CHANGE UP (ref 3.5–5.3)
RBC # BLD: 2.46 M/UL — LOW (ref 4.2–5.8)
RBC # BLD: 2.78 M/UL — LOW (ref 4.2–5.8)
RBC # FLD: 15.9 % — HIGH (ref 10.3–14.5)
RBC # FLD: 16.1 % — HIGH (ref 10.3–14.5)
SODIUM SERPL-SCNC: 136 MMOL/L — SIGNIFICANT CHANGE UP (ref 135–145)
SPECIMEN SOURCE: SIGNIFICANT CHANGE UP
TACROLIMUS SERPL-MCNC: 10.4 NG/ML — SIGNIFICANT CHANGE UP
VANCOMYCIN TROUGH SERPL-MCNC: 15.8 UG/ML — SIGNIFICANT CHANGE UP (ref 10–20)
WBC # BLD: 5.66 K/UL — SIGNIFICANT CHANGE UP (ref 3.8–10.5)
WBC # BLD: 6.37 K/UL — SIGNIFICANT CHANGE UP (ref 3.8–10.5)
WBC # FLD AUTO: 5.66 K/UL — SIGNIFICANT CHANGE UP (ref 3.8–10.5)
WBC # FLD AUTO: 6.37 K/UL — SIGNIFICANT CHANGE UP (ref 3.8–10.5)

## 2020-01-29 PROCEDURE — 99232 SBSQ HOSP IP/OBS MODERATE 35: CPT

## 2020-01-29 PROCEDURE — 80197 ASSAY OF TACROLIMUS: CPT

## 2020-01-29 PROCEDURE — 87631 RESP VIRUS 3-5 TARGETS: CPT

## 2020-01-29 PROCEDURE — 85730 THROMBOPLASTIN TIME PARTIAL: CPT

## 2020-01-29 PROCEDURE — 87798 DETECT AGENT NOS DNA AMP: CPT

## 2020-01-29 PROCEDURE — 84145 PROCALCITONIN (PCT): CPT

## 2020-01-29 PROCEDURE — 99285 EMERGENCY DEPT VISIT HI MDM: CPT

## 2020-01-29 PROCEDURE — 85027 COMPLETE CBC AUTOMATED: CPT

## 2020-01-29 PROCEDURE — 81001 URINALYSIS AUTO W/SCOPE: CPT

## 2020-01-29 PROCEDURE — 80053 COMPREHEN METABOLIC PANEL: CPT

## 2020-01-29 PROCEDURE — 93005 ELECTROCARDIOGRAM TRACING: CPT

## 2020-01-29 PROCEDURE — 87581 M.PNEUMON DNA AMP PROBE: CPT

## 2020-01-29 PROCEDURE — 83605 ASSAY OF LACTIC ACID: CPT

## 2020-01-29 PROCEDURE — 99223 1ST HOSP IP/OBS HIGH 75: CPT | Mod: GC

## 2020-01-29 PROCEDURE — 87633 RESP VIRUS 12-25 TARGETS: CPT

## 2020-01-29 PROCEDURE — 87086 URINE CULTURE/COLONY COUNT: CPT

## 2020-01-29 PROCEDURE — 99233 SBSQ HOSP IP/OBS HIGH 50: CPT

## 2020-01-29 PROCEDURE — 85610 PROTHROMBIN TIME: CPT

## 2020-01-29 PROCEDURE — 80202 ASSAY OF VANCOMYCIN: CPT

## 2020-01-29 PROCEDURE — 36415 COLL VENOUS BLD VENIPUNCTURE: CPT

## 2020-01-29 PROCEDURE — 80048 BASIC METABOLIC PNL TOTAL CA: CPT

## 2020-01-29 PROCEDURE — 71045 X-RAY EXAM CHEST 1 VIEW: CPT

## 2020-01-29 PROCEDURE — 87040 BLOOD CULTURE FOR BACTERIA: CPT

## 2020-01-29 PROCEDURE — 87486 CHLMYD PNEUM DNA AMP PROBE: CPT

## 2020-01-29 PROCEDURE — 93010 ELECTROCARDIOGRAM REPORT: CPT | Mod: 76

## 2020-01-29 PROCEDURE — 94640 AIRWAY INHALATION TREATMENT: CPT

## 2020-01-29 RX ORDER — FOLIC ACID 0.8 MG
1 TABLET ORAL DAILY
Refills: 0 | Status: DISCONTINUED | OUTPATIENT
Start: 2020-01-29 | End: 2020-02-01

## 2020-01-29 RX ORDER — ATOVAQUONE 750 MG/5ML
750 SUSPENSION ORAL
Refills: 0 | Status: DISCONTINUED | OUTPATIENT
Start: 2020-01-29 | End: 2020-02-01

## 2020-01-29 RX ORDER — SERTRALINE 25 MG/1
50 TABLET, FILM COATED ORAL DAILY
Refills: 0 | Status: DISCONTINUED | OUTPATIENT
Start: 2020-01-30 | End: 2020-02-01

## 2020-01-29 RX ORDER — TAMSULOSIN HYDROCHLORIDE 0.4 MG/1
0.4 CAPSULE ORAL AT BEDTIME
Refills: 0 | Status: DISCONTINUED | OUTPATIENT
Start: 2020-01-29 | End: 2020-02-01

## 2020-01-29 RX ORDER — CIPROFLOXACIN HCL 0.3 %
1 DROPS OPHTHALMIC (EYE)
Qty: 0 | Refills: 0 | DISCHARGE
Start: 2020-01-29

## 2020-01-29 RX ORDER — AZITHROMYCIN 500 MG/1
0 TABLET, FILM COATED ORAL
Qty: 0 | Refills: 0 | DISCHARGE

## 2020-01-29 RX ORDER — FINASTERIDE 5 MG/1
5 TABLET, FILM COATED ORAL DAILY
Refills: 0 | Status: DISCONTINUED | OUTPATIENT
Start: 2020-01-29 | End: 2020-02-01

## 2020-01-29 RX ORDER — TACROLIMUS 5 MG/1
0.5 CAPSULE ORAL DAILY
Refills: 0 | Status: DISCONTINUED | OUTPATIENT
Start: 2020-01-30 | End: 2020-02-01

## 2020-01-29 RX ORDER — METOPROLOL TARTRATE 50 MG
12.5 TABLET ORAL DAILY
Refills: 0 | Status: DISCONTINUED | OUTPATIENT
Start: 2020-01-30 | End: 2020-02-01

## 2020-01-29 RX ORDER — VORICONAZOLE 10 MG/ML
200 INJECTION, POWDER, LYOPHILIZED, FOR SOLUTION INTRAVENOUS EVERY 12 HOURS
Refills: 0 | Status: DISCONTINUED | OUTPATIENT
Start: 2020-01-30 | End: 2020-02-01

## 2020-01-29 RX ORDER — ALPRAZOLAM 0.25 MG
0.5 TABLET ORAL AT BEDTIME
Refills: 0 | Status: DISCONTINUED | OUTPATIENT
Start: 2020-01-29 | End: 2020-02-01

## 2020-01-29 RX ORDER — URSODIOL 250 MG/1
300 TABLET, FILM COATED ORAL
Refills: 0 | Status: DISCONTINUED | OUTPATIENT
Start: 2020-01-29 | End: 2020-02-01

## 2020-01-29 RX ORDER — PANTOPRAZOLE SODIUM 20 MG/1
40 TABLET, DELAYED RELEASE ORAL
Refills: 0 | Status: DISCONTINUED | OUTPATIENT
Start: 2020-01-29 | End: 2020-02-01

## 2020-01-29 RX ADMIN — VORICONAZOLE 200 MILLIGRAM(S): 10 INJECTION, POWDER, LYOPHILIZED, FOR SOLUTION INTRAVENOUS at 06:00

## 2020-01-29 RX ADMIN — FINASTERIDE 5 MILLIGRAM(S): 5 TABLET, FILM COATED ORAL at 11:17

## 2020-01-29 RX ADMIN — MAGNESIUM OXIDE 400 MG ORAL TABLET 400 MILLIGRAM(S): 241.3 TABLET ORAL at 08:10

## 2020-01-29 RX ADMIN — SERTRALINE 50 MILLIGRAM(S): 25 TABLET, FILM COATED ORAL at 11:17

## 2020-01-29 RX ADMIN — PANTOPRAZOLE SODIUM 40 MILLIGRAM(S): 20 TABLET, DELAYED RELEASE ORAL at 06:00

## 2020-01-29 RX ADMIN — Medication 1 DROP(S): at 06:06

## 2020-01-29 RX ADMIN — ATOVAQUONE 750 MILLIGRAM(S): 750 SUSPENSION ORAL at 05:59

## 2020-01-29 RX ADMIN — Medication 400 MILLIGRAM(S): at 06:00

## 2020-01-29 RX ADMIN — TACROLIMUS 0.5 MILLIGRAM(S): 5 CAPSULE ORAL at 11:17

## 2020-01-29 RX ADMIN — ATOVAQUONE 750 MILLIGRAM(S): 750 SUSPENSION ORAL at 23:48

## 2020-01-29 RX ADMIN — Medication 1 DROP(S): at 11:17

## 2020-01-29 RX ADMIN — Medication 12.5 MILLIGRAM(S): at 06:00

## 2020-01-29 RX ADMIN — MAGNESIUM OXIDE 400 MG ORAL TABLET 400 MILLIGRAM(S): 241.3 TABLET ORAL at 11:17

## 2020-01-29 RX ADMIN — Medication 0.5 MILLIGRAM(S): at 23:49

## 2020-01-29 RX ADMIN — PIPERACILLIN AND TAZOBACTAM 25 GRAM(S): 4; .5 INJECTION, POWDER, LYOPHILIZED, FOR SOLUTION INTRAVENOUS at 06:02

## 2020-01-29 RX ADMIN — Medication 400 MILLIGRAM(S): at 17:29

## 2020-01-29 RX ADMIN — PIPERACILLIN AND TAZOBACTAM 25 GRAM(S): 4; .5 INJECTION, POWDER, LYOPHILIZED, FOR SOLUTION INTRAVENOUS at 11:17

## 2020-01-29 RX ADMIN — URSODIOL 300 MILLIGRAM(S): 250 TABLET, FILM COATED ORAL at 08:10

## 2020-01-29 RX ADMIN — Medication 250 MILLIGRAM(S): at 04:41

## 2020-01-29 RX ADMIN — TAMSULOSIN HYDROCHLORIDE 0.4 MILLIGRAM(S): 0.4 CAPSULE ORAL at 23:49

## 2020-01-29 RX ADMIN — Medication 1 TABLET(S): at 11:16

## 2020-01-29 NOTE — DISCHARGE NOTE NURSING/CASE MANAGEMENT/SOCIAL WORK - PATIENT PORTAL LINK FT
You can access the FollowMyHealth Patient Portal offered by [facility] by registering at the following website: [email or web address]. By joining [employer]’s FollowMyHealth portal, you will also be able to view your health information using other applications (apps) compatible with our system.

## 2020-01-29 NOTE — H&P ADULT - PROBLEM SELECTOR PLAN 7
DVT-SCDs  Diet-regular     Transitions of Care Status:  1.  Name of PCP: Dr. Gaytan  2.  PCP Contacted on Admission: [ ] Y    [x ] N    3.  PCP contacted at Discharge: [ ] Y    [ ] N    [ ] N/A  4.  Post-Discharge Appointment Date and Location:  5.  Summary of Handoff given to PCP: Hgb stable, no acute signs of bleeding   -transfuse if hgb <7   -consider iron studies in the AM if hgb is downtrending

## 2020-01-29 NOTE — H&P ADULT - NSHPREVIEWOFSYSTEMS_GEN_ALL_CORE
REVIEW OF SYSTEMS:    CONSTITUTIONAL: +Fever  EYES/ENT: No visual changes;  No vertigo or throat pain   NECK: No pain or stiffness  RESPIRATORY: +cough, no hemoptysis   CARDIOVASCULAR: No chest pain or palpitations  GASTROINTESTINAL: No abdominal or epigastric pain. No nausea, vomiting, or hematemesis; No diarrhea or constipation. No melena or hematochezia.  GENITOURINARY: No dysuria, frequency or hematuria  NEUROLOGICAL: No numbness or weakness  SKIN: No itching, burning, rashes, or lesions   All other review of systems is negative unless indicated above.

## 2020-01-29 NOTE — H&P ADULT - PROBLEM SELECTOR PLAN 1
fever, cough and hypoxia with CXR showing multifocal PNA in L lobe   -s/p vancomycin and zosyn x 3 day  -C/w vancomyin and zosyn for 3 more days as patient improved. can transition to PO levaquin if patient is being discharged fever, cough and hypoxia with CXR showing multifocal PNA in L lobe   -s/p vancomycin and zosyn x 3 day  -C/w vancomyin and zosyn for 3 more days as patient improved. can eventually transition to PO levaquin if patient is being discharged  - titrate off oxygen  - RVP negative

## 2020-01-29 NOTE — PROGRESS NOTE ADULT - PROBLEM SELECTOR PLAN 1
sepsis  poa   immuno suppressed  Pt. s/p bone marrow transplant in October 2019. Continue IV vanco  q12 ,  zosyne 3.35 gnm q8hr.  50% ventimask applied to maintain O2 above 92%. ID consult called (Dr. Michael). Dr. ellis scott bone marrow transplant unit doctor aware with  treatement plan  and agree with transfer plan  .

## 2020-01-29 NOTE — DISCHARGE NOTE NURSING/CASE MANAGEMENT/SOCIAL WORK - NSDCFUADDAPPT_GEN_ALL_CORE_FT
dr vik saldaña     hospitalist     transfer to [place] for tertiary level of care .  fu with dr ellis schrader your onch there .

## 2020-01-29 NOTE — H&P ADULT - PROBLEM SELECTOR PLAN 6
Most likely due to acute illness vs malignancy   -Trend CBC, transfuse if plts <10, <15 and fever, or <50 and bleeding

## 2020-01-29 NOTE — PROGRESS NOTE ADULT - ATTENDING COMMENTS
Jeramy Smith MD  [phone]
Awaiting to be transferred to [facility]
pt seen and examine see above plan - transfer to [place] once bed available.

## 2020-01-29 NOTE — H&P ADULT - NSHPPHYSICALEXAM_GEN_ALL_CORE
Vital Signs Last 24 Hrs  T(C): 37.3 (29 Jan 2020 19:00), Max: 37.3 (29 Jan 2020 19:00)  T(F): 99.2 (29 Jan 2020 19:00), Max: 99.2 (29 Jan 2020 19:00)  HR: 97 (29 Jan 2020 19:00) (87 - 101)  BP: 124/77 (29 Jan 2020 19:00) (107/71 - 134/74)  BP(mean): --  RR: 18 (29 Jan 2020 19:00) (18 - 18)  SpO2: 91% (29 Jan 2020 19:00) (91% - 97%)    GENERAL: NAD, well-developed, breathing comfortably on 2 L of oxygen   HEENT:  Atraumatic, Normocephalic,  conjunctiva and sclera clear, oral mucosa moist, clear w/o any exudate   NECK: Supple, No JVD  CHEST/LUNG: Clear to auscultation bilaterally; No wheeze  HEART: Regular rate and rhythm; No murmurs, rubs, or gallops  ABDOMEN: Soft, Nontender, Nondistended; Bowel sounds present  EXTREMITIES:  2+ Peripheral Pulses, No clubbing, cyanosis, or edema  PSYCH: AAOx3  NEUROLOGY: non-focal, moving all extremities   SKIN: No rashes or lesions

## 2020-01-29 NOTE — H&P ADULT - PROBLEM SELECTOR PLAN 4
Stable, not on any AC due to thrombocytopenia   -WELL's score is 2.5 (previous PE, malignancy) which suggest moderate risk   -However patient is clinically improving after abx.   -Consider CTA if patient's hypoxia does not improve.

## 2020-01-29 NOTE — H&P ADULT - ATTENDING COMMENTS
I was physically present for the key portions of the evaluation and management (E/M) service provided.  I agree with the above history, physical, and plan which I have reviewed and edited where appropriate.     Plan discussed with Patient, RN    Briefly, this is a 58 y/o s/p bone marrow transplant in October 2019, multiple DVTs and PEs, presents with c/o  cough. The cough was non-productive and later it got worse and  developed fever with  difficulty breathing found to have multifocal pneumonia, currently on IV vanc/zosyn, which will continue.  Titrate off oxygen as tolerated.  Monitor volume status.

## 2020-01-29 NOTE — PROGRESS NOTE ADULT - ASSESSMENT
59M with hx AML s/p allo BMT from his son 10/2019, hx prior aspergillosis, s/p toe amputation for osteomyelitis, COLLEEN, DVT/PE, presents with cough & SOB and appears to have multifocal pneumonia based on CXR.  On broad spectrum antibiotics  Appears to have some subjective improvement  Cx thus far NTD

## 2020-01-29 NOTE — CHART NOTE - NSCHARTNOTEFT_GEN_A_CORE
D/w Dr. Zack Clark from Hem/ onc, suggested to continue home dose of Prednisone and transfer to [facility]

## 2020-01-29 NOTE — H&P ADULT - ASSESSMENT
58 yo M with PMhx of acute myeloid leukemia s/p bone marrow transplant in 10/2019, DVT/PE (not on AC due to thrombocytopenia), HTN, factor V Leiden and HFrEF presents from [facility] with multifocal PNA.

## 2020-01-29 NOTE — H&P ADULT - NSHPLABSRESULTS_GEN_ALL_CORE
8.8    5.66  )-----------( 36       ( 2020 04:46 )             26.7     Hgb Trend: 8.8<--, 8.8<--, 9.3<--      136  |  104  |  23  ----------------------------<  113<H>  4.0   |  24  |  1.00    Ca    8.4<L>      2020 04:46    TPro  5.8<L>  /  Alb  3.0<L>  /  TBili  0.6  /  DBili  x   /  AST  13<L>  /  ALT  10<L>  /  AlkPhos  66      Creatinine Trend: 1.00<--, 1.10<--, 1.20<--        Urinalysis Basic - ( 2020 19:29 )    Color: Yellow / Appearance: Clear / S.015 / pH: x  Gluc: x / Ketone: Negative  / Bili: Negative / Urobili: Negative   Blood: x / Protein: 15 / Nitrite: Negative   Leuk Esterase: Negative / RBC: 0-2 /HPF / WBC 0-2   Sq Epi: x / Non Sq Epi: Occasional / Bacteria: Occasional        < from: Xray Chest 1 View-PORTABLE IMMEDIATE (20 @ 20:10) >    INTERPRETATION:  Chest portable.    Clinical History: Sepsis.    Comparison: 10/12/2019.    Single AP view submitted.    The evaluation of the cardiomediastinal silhouette is limited on portable technique.  There is a prominent cardiac silhouette.    There is left perihilar including upper and lower lung zone patchy airspace consolidation, which may reflect multilobar pneumonia.  Minimal blunting at the left costophrenic angle may represent trace pleural effusion.  Recommend follow-up PA and lateral view after the appropriate clinical course of treatment.    < end of copied text > Labs reviewed    8.8    5.66  )-----------( 36       ( 2020 04:46 )             26.7     Hgb Trend: 8.8<--, 8.8<--, 9.3<--      136  |  104  |  23  ----------------------------<  113<H>  4.0   |  24  |  1.00    Ca    8.4<L>      2020 04:46    TPro  5.8<L>  /  Alb  3.0<L>  /  TBili  0.6  /  DBili  x   /  AST  13<L>  /  ALT  10<L>  /  AlkPhos  66      Creatinine Trend: 1.00<--, 1.10<--, 1.20<--        Urinalysis Basic - ( 2020 19:29 )    Color: Yellow / Appearance: Clear / S.015 / pH: x  Gluc: x / Ketone: Negative  / Bili: Negative / Urobili: Negative   Blood: x / Protein: 15 / Nitrite: Negative   Leuk Esterase: Negative / RBC: 0-2 /HPF / WBC 0-2   Sq Epi: x / Non Sq Epi: Occasional / Bacteria: Occasional      Imaging reviewed  < from: Xray Chest 1 View-PORTABLE IMMEDIATE (20 @ 20:10) >    INTERPRETATION:  Chest portable.    Clinical History: Sepsis.    Comparison: 10/12/2019.    Single AP view submitted.    The evaluation of the cardiomediastinal silhouette is limited on portable technique.  There is a prominent cardiac silhouette.    There is left perihilar including upper and lower lung zone patchy airspace consolidation, which may reflect multilobar pneumonia.  Minimal blunting at the left costophrenic angle may represent trace pleural effusion.  Recommend follow-up PA and lateral view after the appropriate clinical course of treatment.    EKG ordered

## 2020-01-29 NOTE — PROGRESS NOTE ADULT - SUBJECTIVE AND OBJECTIVE BOX
Patient is a 59y old  Male who presents with a chief complaint of Pneumonia (29 Jan 2020 08:00)      INTERVAL HPI/OVERNIGHT EVENTS: 60 y/o s/p bone marrow transplant in October 2019, presents with c/o  cough. The cough was non-productive and later it got worse and  developed fever with  difficulty breathing. He came to the ER because of his SOB. Pt. stated  he saw his bone marrow doctor (Dr. Gaytan [phone]), last week; he was told everything was ok.  history of multiple DVTs and PEs admitted possible pna with immunosuppression on venti mask . Awaiting to be transferred to [facility]. Seen and examined at bedside. Denies chest pain, still has cough and sob.       MEDICATIONS  (STANDING):  acyclovir   Oral Tab/Cap 400 milliGRAM(s) Oral every 8 hours  ALPRAZolam 0.5 milliGRAM(s) Oral at bedtime  atovaquone Suspension 750 milliGRAM(s) Oral two times a day  ciprofloxacin  0.3% Ophthalmic Solution 1 Drop(s) Both EYES every 4 hours  finasteride 5 milliGRAM(s) Oral daily  folic acid 1 milliGRAM(s) Oral daily  magnesium oxide 400 milliGRAM(s) Oral three times a day with meals  metoprolol succinate ER 12.5 milliGRAM(s) Oral daily  multivitamin 1 Tablet(s) Oral daily  pantoprazole    Tablet 40 milliGRAM(s) Oral before breakfast  piperacillin/tazobactam IVPB.. 3.375 Gram(s) IV Intermittent every 8 hours  sertraline 50 milliGRAM(s) Oral daily  tacrolimus 0.5 milliGRAM(s) Oral daily  tamsulosin 0.4 milliGRAM(s) Oral at bedtime  ursodiol Oral Tab/Cap - Peds 300 milliGRAM(s) Oral two times a day with meals  vancomycin  IVPB 1000 milliGRAM(s) IV Intermittent every 12 hours  voriconazole 200 milliGRAM(s) Oral every 12 hours    MEDICATIONS  (PRN):  albuterol/ipratropium for Nebulization 3 milliLiter(s) Nebulizer every 6 hours PRN Shortness of Breath and/or Wheezing      Allergies    No Known Allergies    Intolerances        REVIEW OF SYSTEMS:  CONSTITUTIONAL: No fever,  or fatigue  EYES: No eye pain, visual disturbances, or discharge  ENMT:  No difficulty hearing, tinnitus, vertigo; No sinus or throat pain  NECK: No pain or stiffness  RESPIRATORY: + cough, No  wheezing, chills or hemoptysis; + shortness of breath  CARDIOVASCULAR: No chest pain, palpitations, dizziness, or leg swelling  GASTROINTESTINAL: No abdominal or epigastric pain. No nausea, vomiting, or hematemesis; No diarrhea or constipation.   GENITOURINARY: No dysuria, frequency, hematuria, or incontinence  NEUROLOGICAL: No headaches, memory loss, loss of strength, numbness, or tremors  SKIN: No itching, burning, rashes, or lesions   LYMPH NODES: No enlarged glands  ENDOCRINE: No heat or cold intolerance; No hair loss; No polydipsia or polyuria  MUSCULOSKELETAL: No joint pain or swelling; No muscle, back, or extremity pain  HEME/LYMPH: No easy bruising, or bleeding gums  ALLERGY AND IMMUNOLOGIC: No hives or eczema    Vital Signs Last 24 Hrs  T(C): 36.9 (29 Jan 2020 05:57), Max: 37.1 (28 Jan 2020 20:00)  T(F): 98.4 (29 Jan 2020 05:57), Max: 98.8 (28 Jan 2020 20:00)  HR: 87 (29 Jan 2020 05:57) (87 - 90)  BP: 107/71 (29 Jan 2020 05:57) (101/66 - 107/71)  BP(mean): --  RR: 19 (28 Jan 2020 20:00) (19 - 20)  SpO2: 97% (29 Jan 2020 05:57) (94% - 97%)    PHYSICAL EXAM:  GENERAL: NAD, well-groomed, well-developed  HEAD:  Atraumatic, Normocephalic  EYES: EOMI, PERRLA, conjunctiva and sclera clear  ENMT: No tonsillar erythema, exudates, or enlargement; Moist mucous membranes, Good dentition, No lesions  NECK: Supple, No JVD, Normal thyroid  NERVOUS SYSTEM:  Alert & Oriented X3, Good concentration; Motor Strength 5/5 B/L upper and lower extremities; DTRs 2+ intact and symmetric  CHEST/LUNG: Clear to auscultation bilaterally; No rales, rhonchi, wheezing, or rubs  HEART: Regular rate and rhythm; No murmurs, rubs, or gallops  ABDOMEN: Soft, Nontender, Nondistended; Bowel sounds present  EXTREMITIES:  2+ Peripheral Pulses, No clubbing, cyanosis, or edema  LYMPH: No lymphadenopathy noted  SKIN: No rashes or lesions    LABS:                        8.8    5.66  )-----------( 36       ( 29 Jan 2020 04:46 )             26.7     29 Jan 2020 04:46    136    |  104    |  23     ----------------------------<  113    4.0     |  24     |  1.00     Ca    8.4        29 Jan 2020 04:46      PT/INR - ( 27 Jan 2020 19:52 )   PT: 13.9 sec;   INR: 1.22 ratio         PTT - ( 27 Jan 2020 19:52 )  PTT:28.4 sec  CAPILLARY BLOOD GLUCOSE        BLOOD CULTURE  01-28 @ 00:30   No growth to date.  --  --    RADIOLOGY & ADDITIONAL TESTS:    Imaging Personally Reviewed:  [ ] YES     Consultant(s) Notes Reviewed:      Care Discussed with Consultants/Other Providers: Patient is a 59y old  Male who presents with a chief complaint of Pneumonia (29 Jan 2020 08:00)      INTERVAL HPI/OVERNIGHT EVENTS: 60 y/o s/p bone marrow transplant in October 2019, presents with c/o  cough. The cough was non-productive and later it got worse and  developed fever with  difficulty breathing. He came to the ER because of his SOB. Pt. stated  he saw his bone marrow doctor (Dr. Gaytan [phone]), last week; he was told everything was ok.  history of multiple DVTs and PEs admitted possible pna with immunosuppression on venti mask . Awaiting to be transferred to [facility]. Seen and examined at bedside. Denies chest pain, still has cough      MEDICATIONS  (STANDING):  acyclovir   Oral Tab/Cap 400 milliGRAM(s) Oral every 8 hours  ALPRAZolam 0.5 milliGRAM(s) Oral at bedtime  atovaquone Suspension 750 milliGRAM(s) Oral two times a day  ciprofloxacin  0.3% Ophthalmic Solution 1 Drop(s) Both EYES every 4 hours  finasteride 5 milliGRAM(s) Oral daily  folic acid 1 milliGRAM(s) Oral daily  magnesium oxide 400 milliGRAM(s) Oral three times a day with meals  metoprolol succinate ER 12.5 milliGRAM(s) Oral daily  multivitamin 1 Tablet(s) Oral daily  pantoprazole    Tablet 40 milliGRAM(s) Oral before breakfast  piperacillin/tazobactam IVPB.. 3.375 Gram(s) IV Intermittent every 8 hours  sertraline 50 milliGRAM(s) Oral daily  tacrolimus 0.5 milliGRAM(s) Oral daily  tamsulosin 0.4 milliGRAM(s) Oral at bedtime  ursodiol Oral Tab/Cap - Peds 300 milliGRAM(s) Oral two times a day with meals  vancomycin  IVPB 1000 milliGRAM(s) IV Intermittent every 12 hours  voriconazole 200 milliGRAM(s) Oral every 12 hours    MEDICATIONS  (PRN):  albuterol/ipratropium for Nebulization 3 milliLiter(s) Nebulizer every 6 hours PRN Shortness of Breath and/or Wheezing      Allergies    No Known Allergies    Intolerances        REVIEW OF SYSTEMS:  CONSTITUTIONAL: No fever,  or fatigue  EYES: No eye pain, visual disturbances, or discharge  ENMT:  No difficulty hearing, tinnitus, vertigo; No sinus or throat pain  NECK: No pain or stiffness  RESPIRATORY: + cough, No  wheezing, chills or hemoptysis;  mild shortness of breath  CARDIOVASCULAR: No chest pain, palpitations, dizziness, or leg swelling  GASTROINTESTINAL: No abdominal or epigastric pain. No nausea, vomiting, or hematemesis; No diarrhea or constipation.   GENITOURINARY: No dysuria, frequency, hematuria, or incontinence  NEUROLOGICAL: No headaches, memory loss, loss of strength, numbness, or tremors  SKIN: No itching, burning, rashes, or lesions   LYMPH NODES: No enlarged glands  ENDOCRINE: No heat or cold intolerance; No hair loss; No polydipsia or polyuria  MUSCULOSKELETAL: No joint pain or swelling; No muscle, back, or extremity pain  HEME/LYMPH: No easy bruising, or bleeding gums  ALLERGY AND IMMUNOLOGIC: No hives or eczema    Vital Signs Last 24 Hrs  T(C): 36.9 (29 Jan 2020 05:57), Max: 37.1 (28 Jan 2020 20:00)  T(F): 98.4 (29 Jan 2020 05:57), Max: 98.8 (28 Jan 2020 20:00)  HR: 87 (29 Jan 2020 05:57) (87 - 90)  BP: 107/71 (29 Jan 2020 05:57) (101/66 - 107/71)  BP(mean): --  RR: 19 (28 Jan 2020 20:00) (19 - 20)  SpO2: 97% (29 Jan 2020 05:57) (94% - 97%)    PHYSICAL EXAM:  GENERAL: NAD, well-groomed, well-developed  HEAD:  Atraumatic, Normocephalic  EYES: EOMI, PERRLA, conjunctiva and sclera clear  ENMT: No tonsillar erythema, exudates, or enlargement; Moist mucous membranes, Good dentition, No lesions  NECK: Supple, No JVD, Normal thyroid  NERVOUS SYSTEM:  Alert & Oriented X3, Good concentration; Motor Strength 5/5 B/L upper and lower extremities; DTRs 2+ intact and symmetric  CHEST/LUNG: Clear to auscultation bilaterally; No rales, rhonchi, wheezing, or rubs  HEART: Regular rate and rhythm; No murmurs, rubs, or gallops  ABDOMEN: Soft, Nontender, Nondistended; Bowel sounds present  EXTREMITIES:  2+ Peripheral Pulses, No clubbing, cyanosis, or edema  LYMPH: No lymphadenopathy noted  SKIN: No rashes or lesions    LABS:                        8.8    5.66  )-----------( 36       ( 29 Jan 2020 04:46 )             26.7     29 Jan 2020 04:46    136    |  104    |  23     ----------------------------<  113    4.0     |  24     |  1.00     Ca    8.4        29 Jan 2020 04:46      PT/INR - ( 27 Jan 2020 19:52 )   PT: 13.9 sec;   INR: 1.22 ratio         PTT - ( 27 Jan 2020 19:52 )  PTT:28.4 sec  CAPILLARY BLOOD GLUCOSE        BLOOD CULTURE  01-28 @ 00:30   No growth to date.  --  --    RADIOLOGY & ADDITIONAL TESTS:    Imaging Personally Reviewed:  [ ] YES     Consultant(s) Notes Reviewed:      Care Discussed with Consultants/Other Providers:

## 2020-01-29 NOTE — CHART NOTE - NSCHARTNOTEFT_GEN_A_CORE
D/w Cardio Dr. Lu. Patient has been in sinus rhythm in 80's. Patient can be transferred to [facility] to [facility]. D/W Transfer center. D/w Cardio Dr. Lu. Patient has been in sinus rhythm in 80's. Patient can be transferred to [facility] to [facility]. D/W Transfer center and hospitalist Dr. Crowe.

## 2020-01-29 NOTE — H&P ADULT - HISTORY OF PRESENT ILLNESS
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 Patient initially presented with worsening nonproductive cough, orthopnea, and fever, found to have fever of 102 F and hypoxic respiratory failure with SO2 at 86% on RA. CXR showed multifocal PNA, mostly in CARLENE and LLL. ICU was also consulted at [facility] for hypoxic respiratory failure and POCUS showed diffuse BL Bines and BL pleural effusions. He was treated with vancomyicn, zosyn, and lasix. He was then transferred to [facility] for further evaluation. 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 Patient initially presented with worsening nonproductive cough, orthopnea, and fever, found to have fever of 102 F and hypoxic respiratory failure with SO2 at 86% on RA. CXR showed multifocal PNA, mostly in CARLENE and LLL. ICU was also consulted at [facility] for hypoxic respiratory failure and POCUS showed diffuse BL Bines and BL pleural effusions. He was treated with vancomyicn, zosyn, and lasix. He was then transferred to [facility] for further evaluation.  Currently patient states he feels better, cough and SOB has improved.  He is also urinating well.

## 2020-01-29 NOTE — PROGRESS NOTE ADULT - SUBJECTIVE AND OBJECTIVE BOX
[facility] - Ede Zamudio, Riki, Edgar, Gem, Tabitha Garcia  Office Number:  [phone]    Patient resting comfortably in bed in NAD.  Laying flat with no respiratory distress.  Says his breathing is a little better.  no chest pain or palpitations.    ROS: negative unless otherwise mentioned.    Telemetry:  off    MEDICATIONS  (STANDING):  acyclovir   Oral Tab/Cap 400 milliGRAM(s) Oral every 8 hours  ALPRAZolam 0.5 milliGRAM(s) Oral at bedtime  atovaquone Suspension 750 milliGRAM(s) Oral two times a day  ciprofloxacin  0.3% Ophthalmic Solution 1 Drop(s) Both EYES every 4 hours  finasteride 5 milliGRAM(s) Oral daily  folic acid 1 milliGRAM(s) Oral daily  magnesium oxide 400 milliGRAM(s) Oral three times a day with meals  metoprolol succinate ER 12.5 milliGRAM(s) Oral daily  multivitamin 1 Tablet(s) Oral daily  pantoprazole    Tablet 40 milliGRAM(s) Oral before breakfast  piperacillin/tazobactam IVPB.. 3.375 Gram(s) IV Intermittent every 8 hours  sertraline 50 milliGRAM(s) Oral daily  tacrolimus 0.5 milliGRAM(s) Oral daily  tamsulosin 0.4 milliGRAM(s) Oral at bedtime  ursodiol Oral Tab/Cap - Peds 300 milliGRAM(s) Oral two times a day with meals  vancomycin  IVPB 1000 milliGRAM(s) IV Intermittent every 12 hours  voriconazole 200 milliGRAM(s) Oral every 12 hours    MEDICATIONS  (PRN):  albuterol/ipratropium for Nebulization 3 milliLiter(s) Nebulizer every 6 hours PRN Shortness of Breath and/or Wheezing      Allergies    No Known Allergies    Intolerances        Vital Signs Last 24 Hrs  T(C): 36.9 (29 Jan 2020 05:57), Max: 37.1 (28 Jan 2020 20:00)  T(F): 98.4 (29 Jan 2020 05:57), Max: 98.8 (28 Jan 2020 20:00)  HR: 87 (29 Jan 2020 05:57) (87 - 90)  BP: 107/71 (29 Jan 2020 05:57) (101/66 - 107/71)  BP(mean): --  RR: 19 (28 Jan 2020 20:00) (19 - 20)  SpO2: 97% (29 Jan 2020 05:57) (94% - 97%)    I&O's Summary      ON EXAM:    Constitutional: NAD, sleepy but arousable  Lungs:  Non-labored, breath sounds are clear bilaterally, No wheezing, rales or rhonchi. On venti mask  Cardiovascular: RRR.  S1 and S2 positive.  No murmurs, rubs, gallops or clicks  Gastrointestinal: Bowel Sounds present, soft, nontender.   Lymph: No peripheral edema.  Neurological: sleepy but arousable, no focal deficits    LABS: All Labs Reviewed:                        8.8    5.66  )-----------( 36       ( 29 Jan 2020 04:46 )             26.7                         8.8    7.73  )-----------( 35       ( 28 Jan 2020 00:25 )             27.1                         9.3    7.37  )-----------( 37       ( 27 Jan 2020 19:52 )             28.8     29 Jan 2020 04:46    136    |  104    |  23     ----------------------------<  113    4.0     |  24     |  1.00   28 Jan 2020 00:25    137    |  108    |  22     ----------------------------<  145    4.8     |  21     |  1.10   27 Jan 2020 19:52    138    |  107    |  23     ----------------------------<  148    5.0     |  24     |  1.20     Ca    8.4        29 Jan 2020 04:46  Ca    8.7        28 Jan 2020 00:25  Ca    9.1        27 Jan 2020 19:52    TPro  5.8    /  Alb  3.0    /  TBili  0.6    /  DBili  x      /  AST  13     /  ALT  10     /  AlkPhos  66     28 Jan 2020 00:25  TPro  6.2    /  Alb  3.3    /  TBili  0.6    /  DBili  x      /  AST  16     /  ALT  11     /  AlkPhos  70     27 Jan 2020 19:52    PT/INR - ( 27 Jan 2020 19:52 )   PT: 13.9 sec;   INR: 1.22 ratio         PTT - ( 27 Jan 2020 19:52 )  PTT:28.4 sec      Blood Culture: Organism --  Gram Stain Blood -- Gram Stain --  Specimen Source .Blood Blood-Peripheral  Culture-Blood --

## 2020-01-29 NOTE — PROGRESS NOTE ADULT - ASSESSMENT
Mr. Delong is a 59 year old male, with previously diagnosed acute myeloid leukemia. s/p induction chemotherapy with Daunorubicin/Cytarabine and  HIDAC consolidation chemotherapy, s/p  Haplo-identical stem cell transplant from son. Other history includes non-ischemic cardiomyopathy (recent EF 25% 9/19/19), COLLEEN, DVT/PE, HTN, Factor V Leiden and amputation of right toe due to osteomyelitis. Admitted for pneumonia.    - Known LV dysfunction, on prior echo/MUGAs  - No evidence of volume overload at present.     - Admission EKG sinus tachy with frequent PVC's, with RBBB and lat ST depressions  - Continue to maintain strict I's and O's, and to monitor for signs of volume overload, which he is at risk for.    - No evidence of acute ischemia  - BP is overall stable but has been running low  - Continue BB at current doses as tolerated by BP.    - Hx of dvt/pe but can not be AC at this time 2/2 low platelets  - Monitor and replete lytes  - To follow while admitted. Plan for transfer to  per heme/onc.  - Other cardiovascular workup will depend on clinical course.  - All other workup per primary team.

## 2020-01-29 NOTE — H&P ADULT - PROBLEM SELECTOR PLAN 2
s/p bone marrow transplant.   -C/w tacro 0.5 daily. f/u with tacro level in the AM   -f/u with Dr. schrader in the AM s/p bone marrow transplant.   -C/w tacro 0.5 daily. f/u with tacro level in the AM   - CMV PCR negative  -f/u with Dr. schrader in the AM

## 2020-01-29 NOTE — H&P ADULT - PROBLEM SELECTOR PLAN 9
1.  Name of PCP: Dr. Gaytan  2.  PCP Contacted on Admission: [ ] Y    [ ] N    3.  PCP contacted at Discharge: [ ] Y    [ ] N    [ ] N/A  4.  Post-Discharge Appointment Date and Location:  5.  Summary of Handoff given to PCP:

## 2020-01-29 NOTE — H&P ADULT - PROBLEM SELECTOR PLAN 3
HFrEF as per cardiology note   -Not volume overloaded on exam   -Patient had PVCs at [facility], continue with metoprolol 12.5 mg daily   -Consider cardiology consult in the Am HFrEF as per cardiology note   - s/p lasix  -Not volume overloaded on exam   -Patient had PVCs at [facility], continue with metoprolol 12.5 mg daily   -Consider cardiology consult in the Am

## 2020-01-29 NOTE — H&P ADULT - NSHPSOCIALHISTORY_GEN_ALL_CORE
He lives with his GF. He was a former smoker. He denies any current alcohol consumptions. Uses marijuana monthly

## 2020-01-29 NOTE — PROGRESS NOTE ADULT - SUBJECTIVE AND OBJECTIVE BOX
[facility], Division of Infectious Diseases  KIM [place] A. Lee  [phone]    Name: DI CONTRERAS  Age: 59y  Gender: Male  MRN: [id number]    Interval History--  Notes reviewed. Feeling better. Remains of 40% VM but states breathing is more comfortable. Denies fevers, chills, or rigors. No CP.   Plans for transfer to [facility] noted.    Past Medical History--  COLLEEN (obstructive sleep apnea)  Pulmonary embolism  Deep vein thrombosis (DVT)  H/O cardiomyopathy  Factor 5 Leiden mutation, heterozygous  No significant past surgical history      For details regarding the patient's social history, family history, and other miscellaneous elements, please refer the initial infectious diseases consultation and/or the admitting history and physical examination for this admission.    Allergies    No Known Allergies    Intolerances        Medications--  Antibiotics:  acyclovir   Oral Tab/Cap 400 milliGRAM(s) Oral every 8 hours  atovaquone Suspension 750 milliGRAM(s) Oral two times a day  piperacillin/tazobactam IVPB.. 3.375 Gram(s) IV Intermittent every 8 hours  vancomycin  IVPB 1000 milliGRAM(s) IV Intermittent every 12 hours  voriconazole 200 milliGRAM(s) Oral every 12 hours    Immunologic:  tacrolimus 0.5 milliGRAM(s) Oral daily    Other:  albuterol/ipratropium for Nebulization PRN  ALPRAZolam  ciprofloxacin  0.3% Ophthalmic Solution  finasteride  folic acid  magnesium oxide  metoprolol succinate ER  multivitamin  pantoprazole    Tablet  sertraline  tamsulosin  ursodiol Oral Tab/Cap - Peds      Review of Systems--  A 10-point review of systems was obtained.   Review of systems otherwise negative except as previously noted.    Physical Examination--  Vital Signs: T(F): 98.4 (01-29-20 @ 05:57), Max: 98.8 (01-28-20 @ 20:00)  HR: 87 (01-29-20 @ 05:57)  BP: 107/71 (01-29-20 @ 05:57)  RR: 19 (01-28-20 @ 20:00)  SpO2: 97% (01-29-20 @ 05:57)  Wt(kg): --  General: Nontoxic-appearing Male in no acute distress.  HEENT: AT/NC.  Anicteric. Conjunctiva pink and moist. Oropharynx clear. Dentition fair.  Neck: Not rigid. No sense of mass.  Nodes: None palpable.  Lungs: Wheezing/rhonchi L lung base >apex > R lung  Heart: Regular rate and rhythm. No Murmur. No rub. No gallop. No palpable thrill.  Abdomen: Bowel sounds present and normoactive. Soft. Nondistended. Nontender. No sense of mass. No organomegaly.  Back: No spinal tenderness. No costovertebral angle tenderness.   Extremities: No cyanosis or clubbing. No edema.   Skin: Warm. Dry. Good turgor. No rash. No vasculitic stigmata.  Psychiatric: Appropriate affect and mood for situation.         Laboratory Studies--  CBC                        8.8    5.66  )-----------( 36       ( 29 Jan 2020 04:46 )             26.7       Chemistries  01-29    136  |  104  |  23  ----------------------------<  113<H>  4.0   |  24  |  1.00    Ca    8.4<L>      29 Jan 2020 04:46    TPro  5.8<L>  /  Alb  3.0<L>  /  TBili  0.6  /  DBili  x   /  AST  13<L>  /  ALT  10<L>  /  AlkPhos  66  01-28    Vancomycin Level, Trough: 15.8: Vancomycin trough levels should be rapidly reached and maintained at  15-20 ug/ml for life threatening MRSA  infections such as sepsis, endocarditis, osteomyelitis and pneumonia. A  first trough level should be drawn  before the 3rd or 4th dose.  Risk of renal toxicity is increased for levels >15 ug/ml, in patients on  other nephrotoxic drugs, who are  hemodynamically unstable, have unstable renal function, or are on  Vancomycin therapy for >14 days. Renal function with  creatinine levels should be monitored for those patients. ug/mL (01.29.20 @ 04:51)      < from: Xray Chest 1 View-PORTABLE IMMEDIATE (01.27.20 @ 20:10) >  PROCEDURE DATE:  01/27/2020    INTERPRETATION:  Chest portable.  Clinical History: Sepsis.  Comparison: 10/12/2019.  Single AP view submitted.  The evaluation of the cardiomediastinal silhouette is limited on portable technique.  There is a prominent cardiac silhouette.    There is left perihilar including upper and lower lung zone patchy airspace consolidation, which may reflect multilobar pneumonia.  Minimal blunting at the left costophrenic angle may represent trace pleural effusion.  Recommend follow-up PA and lateral view after the appropriate clinical course of treatment.    Impression:    Findings as discussed above.  < end of copied text >      Culture Data    Culture - Blood (collected 28 Jan 2020 00:30)  Source: .Blood Blood-Peripheral  Preliminary Report (29 Jan 2020 01:07):    No growth to date.    Culture - Blood (collected 28 Jan 2020 00:30)  Source: .Blood Blood-Peripheral  Preliminary Report (29 Jan 2020 01:07):    No growth to date.

## 2020-01-29 NOTE — PROGRESS NOTE ADULT - ASSESSMENT
Pt is 60 y/o s/p bone marrow transplant in October 2019, presents with c/o  cough. The cough was non-productive and later it got worse and  developed fever with  difficulty breathing. He came to the ER because of his SOB. Pt. states he saw his bone marrow doctor (Dr. aGytan [phone]), last week; he was told everything was ok.  history of multiple DVTs and PEs admitted possible pna with immunosuppression state

## 2020-01-29 NOTE — H&P ADULT - PROBLEM SELECTOR PLAN 8
DVT-SCDs  Diet-regular     Transitions of Care Status:  1.  Name of PCP: Dr. Gaytan  2.  PCP Contacted on Admission: [ ] Y    [x ] N    3.  PCP contacted at Discharge: [ ] Y    [ ] N    [ ] N/A  4.  Post-Discharge Appointment Date and Location:  5.  Summary of Handoff given to PCP: DVT-SCDs  Diet-regular

## 2020-01-30 DIAGNOSIS — Z02.9 ENCOUNTER FOR ADMINISTRATIVE EXAMINATIONS, UNSPECIFIED: ICD-10-CM

## 2020-01-30 LAB
ALBUMIN SERPL ELPH-MCNC: 3.5 G/DL — SIGNIFICANT CHANGE UP (ref 3.3–5)
ALP SERPL-CCNC: 59 U/L — SIGNIFICANT CHANGE UP (ref 40–120)
ALT FLD-CCNC: 9 U/L — LOW (ref 10–45)
ANION GAP SERPL CALC-SCNC: 12 MMOL/L — SIGNIFICANT CHANGE UP (ref 5–17)
APTT BLD: 25.8 SEC — LOW (ref 27.5–36.3)
AST SERPL-CCNC: 21 U/L — SIGNIFICANT CHANGE UP (ref 10–40)
BILIRUB SERPL-MCNC: 0.3 MG/DL — SIGNIFICANT CHANGE UP (ref 0.2–1.2)
BUN SERPL-MCNC: 21 MG/DL — SIGNIFICANT CHANGE UP (ref 7–23)
CALCIUM SERPL-MCNC: 8.9 MG/DL — SIGNIFICANT CHANGE UP (ref 8.4–10.5)
CHLORIDE SERPL-SCNC: 100 MMOL/L — SIGNIFICANT CHANGE UP (ref 96–108)
CO2 SERPL-SCNC: 23 MMOL/L — SIGNIFICANT CHANGE UP (ref 22–31)
CREAT SERPL-MCNC: 1.18 MG/DL — SIGNIFICANT CHANGE UP (ref 0.5–1.3)
GLUCOSE SERPL-MCNC: 120 MG/DL — HIGH (ref 70–99)
INR BLD: 1.39 RATIO — HIGH (ref 0.88–1.16)
MAGNESIUM SERPL-MCNC: 1.7 MG/DL — SIGNIFICANT CHANGE UP (ref 1.6–2.6)
PHOSPHATE SERPL-MCNC: 3.5 MG/DL — SIGNIFICANT CHANGE UP (ref 2.5–4.5)
POTASSIUM SERPL-MCNC: 4.7 MMOL/L — SIGNIFICANT CHANGE UP (ref 3.5–5.3)
POTASSIUM SERPL-SCNC: 4.7 MMOL/L — SIGNIFICANT CHANGE UP (ref 3.5–5.3)
PROT SERPL-MCNC: 5.7 G/DL — LOW (ref 6–8.3)
PROTHROM AB SERPL-ACNC: 16 SEC — HIGH (ref 10–12.9)
SODIUM SERPL-SCNC: 135 MMOL/L — SIGNIFICANT CHANGE UP (ref 135–145)

## 2020-01-30 PROCEDURE — 99222 1ST HOSP IP/OBS MODERATE 55: CPT

## 2020-01-30 PROCEDURE — 99233 SBSQ HOSP IP/OBS HIGH 50: CPT

## 2020-01-30 RX ORDER — ONDANSETRON 8 MG/1
4 TABLET, FILM COATED ORAL ONCE
Refills: 0 | Status: DISCONTINUED | OUTPATIENT
Start: 2020-01-30 | End: 2020-01-30

## 2020-01-30 RX ORDER — VANCOMYCIN HCL 1 G
1000 VIAL (EA) INTRAVENOUS EVERY 12 HOURS
Refills: 0 | Status: DISCONTINUED | OUTPATIENT
Start: 2020-01-30 | End: 2020-01-31

## 2020-01-30 RX ORDER — VALGANCICLOVIR 450 MG/1
450 TABLET, FILM COATED ORAL DAILY
Refills: 0 | Status: DISCONTINUED | OUTPATIENT
Start: 2020-01-30 | End: 2020-02-01

## 2020-01-30 RX ORDER — PIPERACILLIN AND TAZOBACTAM 4; .5 G/20ML; G/20ML
3.38 INJECTION, POWDER, LYOPHILIZED, FOR SOLUTION INTRAVENOUS ONCE
Refills: 0 | Status: COMPLETED | OUTPATIENT
Start: 2020-01-30 | End: 2020-01-30

## 2020-01-30 RX ORDER — PIPERACILLIN AND TAZOBACTAM 4; .5 G/20ML; G/20ML
3.38 INJECTION, POWDER, LYOPHILIZED, FOR SOLUTION INTRAVENOUS EVERY 8 HOURS
Refills: 0 | Status: DISCONTINUED | OUTPATIENT
Start: 2020-01-30 | End: 2020-01-31

## 2020-01-30 RX ADMIN — Medication 12.5 MILLIGRAM(S): at 06:18

## 2020-01-30 RX ADMIN — Medication 0.5 MILLIGRAM(S): at 21:40

## 2020-01-30 RX ADMIN — Medication 1 TABLET(S): at 12:01

## 2020-01-30 RX ADMIN — URSODIOL 300 MILLIGRAM(S): 250 TABLET, FILM COATED ORAL at 06:18

## 2020-01-30 RX ADMIN — TACROLIMUS 0.5 MILLIGRAM(S): 5 CAPSULE ORAL at 12:01

## 2020-01-30 RX ADMIN — VORICONAZOLE 200 MILLIGRAM(S): 10 INJECTION, POWDER, LYOPHILIZED, FOR SOLUTION INTRAVENOUS at 06:35

## 2020-01-30 RX ADMIN — Medication 10 MILLIGRAM(S): at 06:35

## 2020-01-30 RX ADMIN — VALGANCICLOVIR 450 MILLIGRAM(S): 450 TABLET, FILM COATED ORAL at 18:49

## 2020-01-30 RX ADMIN — FINASTERIDE 5 MILLIGRAM(S): 5 TABLET, FILM COATED ORAL at 12:01

## 2020-01-30 RX ADMIN — Medication 250 MILLIGRAM(S): at 18:44

## 2020-01-30 RX ADMIN — TAMSULOSIN HYDROCHLORIDE 0.4 MILLIGRAM(S): 0.4 CAPSULE ORAL at 21:40

## 2020-01-30 RX ADMIN — PANTOPRAZOLE SODIUM 40 MILLIGRAM(S): 20 TABLET, DELAYED RELEASE ORAL at 06:19

## 2020-01-30 RX ADMIN — PIPERACILLIN AND TAZOBACTAM 25 GRAM(S): 4; .5 INJECTION, POWDER, LYOPHILIZED, FOR SOLUTION INTRAVENOUS at 15:21

## 2020-01-30 RX ADMIN — PIPERACILLIN AND TAZOBACTAM 200 GRAM(S): 4; .5 INJECTION, POWDER, LYOPHILIZED, FOR SOLUTION INTRAVENOUS at 06:35

## 2020-01-30 RX ADMIN — VORICONAZOLE 200 MILLIGRAM(S): 10 INJECTION, POWDER, LYOPHILIZED, FOR SOLUTION INTRAVENOUS at 18:36

## 2020-01-30 RX ADMIN — SERTRALINE 50 MILLIGRAM(S): 25 TABLET, FILM COATED ORAL at 12:01

## 2020-01-30 RX ADMIN — Medication 1 MILLIGRAM(S): at 12:01

## 2020-01-30 RX ADMIN — PIPERACILLIN AND TAZOBACTAM 25 GRAM(S): 4; .5 INJECTION, POWDER, LYOPHILIZED, FOR SOLUTION INTRAVENOUS at 21:40

## 2020-01-30 RX ADMIN — URSODIOL 300 MILLIGRAM(S): 250 TABLET, FILM COATED ORAL at 18:36

## 2020-01-30 RX ADMIN — ATOVAQUONE 750 MILLIGRAM(S): 750 SUSPENSION ORAL at 18:36

## 2020-01-30 RX ADMIN — ATOVAQUONE 750 MILLIGRAM(S): 750 SUSPENSION ORAL at 06:18

## 2020-01-30 NOTE — PROVIDER CONTACT NOTE (OTHER) - ACTION/TREATMENT ORDERED:
PA made aware EKG completed . PA assessed EKG and compared it to previous EKG . No new orders at this time. Will continue to monitor

## 2020-01-30 NOTE — CONSULT NOTE ADULT - SUBJECTIVE AND OBJECTIVE BOX
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 Patient initially presented with worsening nonproductive cough, orthopnea, and fever, found to have fever of 102 F and hypoxic respiratory failure with SO2 at 86% on RA. CXR showed multifocal PNA, mostly in CARLENE and LLL. ICU was also consulted at [facility] for hypoxic respiratory failure and POCUS showed diffuse BL Bines and BL pleural effusions. He was treated with vancomyicn, zosyn, and lasix. He was then transferred to [facility] for further evaluation.  Currently patient states he feels better, cough and SOB has improved.  He is also urinating well. (2020 22:41)      PAST MEDICAL & SURGICAL HISTORY:  COLLEEN (obstructive sleep apnea)  Pulmonary embolism  Deep vein thrombosis (DVT)  H/O cardiomyopathy  Factor 5 Leiden mutation, heterozygous  No significant past surgical history: bone marrow transplant      Review of Systems:   CONSTITUTIONAL: + Fever, No chills  EYES: No eye pain or discharge  ENMT: No sinus or throat pain  NECK: No pain or stiffness  RESPIRATORY: + Shortness of breath (improved), + Cough (improved)  CARDIOVASCULAR: No chest pain or palpitations  GASTROINTESTINAL: No nausea, vomiting, or abdominal pain  GENITOURINARY: No dysuria or hematuria   NEUROLOGICAL: No headache or syncope  SKIN: + Rash (improved), No itching   MUSCULOSKELETAL: No joint pain or back pain  Allergies    No Known Allergies    Intolerances    Social History: Former smoker, Occasional marijuana use, No EtOH use    FAMILY HISTORY:  FH: heart attack: father, mother, and brother      MEDICATIONS  (STANDING):  ALPRAZolam 0.5 milliGRAM(s) Oral at bedtime  atovaquone Suspension 750 milliGRAM(s) Oral two times a day  finasteride 5 milliGRAM(s) Oral daily  folic acid 1 milliGRAM(s) Oral daily  metoprolol succinate ER 12.5 milliGRAM(s) Oral daily  multivitamin 1 Tablet(s) Oral daily  pantoprazole    Tablet 40 milliGRAM(s) Oral before breakfast  piperacillin/tazobactam IVPB.. 3.375 Gram(s) IV Intermittent every 8 hours  predniSONE   Tablet 10 milliGRAM(s) Oral daily  sertraline 50 milliGRAM(s) Oral daily  tacrolimus 0.5 milliGRAM(s) Oral daily  tamsulosin 0.4 milliGRAM(s) Oral at bedtime  ursodiol Capsule 300 milliGRAM(s) Oral two times a day  valGANciclovir 450 milliGRAM(s) Oral daily  vancomycin  IVPB 1000 milliGRAM(s) IV Intermittent every 12 hours  voriconazole 200 milliGRAM(s) Oral every 12 hours    MEDICATIONS  (PRN):        CAPILLARY BLOOD GLUCOSE        I&O's Summary    2020 07:01  -  2020 14:22  --------------------------------------------------------  IN: 0 mL / OUT: 120 mL / NET: -120 mL    Vital Signs Last 24 Hrs  T(C): 36.7 (2020 08:57), Max: 37.3 (2020 19:00)  T(F): 98 (2020 08:57), Max: 99.2 (2020 19:00)  HR: 95 (2020 08:57) (92 - 101)  BP: 130/79 (2020 08:57) (124/77 - 137/88)  BP(mean): --  RR: 18 (2020 08:57) (18 - 18)  SpO2: 90% (2020 08:57) (90% - 93%)    PHYSICAL EXAM:  GENERAL: NAD, Sitting in a chair  HEENT: NC/AT, MMM  NECK: Supple  CHEST/LUNG: Grossly CTAB; No wheeze appreciated  HEART: RRR; +S1/S2  ABDOMEN: +BS, Soft, NT, No rigidity  EXTREMITIES: No LE edema  NEUROLOGY: Awake and alert, Answering questions and following commands appropriately  SKIN: Warm and dry, Mild erythema seen behind neck (improved per patient)  PSYCH: Calm and cooperative    LABS:                        9.6    6.37  )-----------( 40       ( 2020 23:40 )             30.3     01-    135  |  100  |  21  ----------------------------<  120<H>  4.7   |  23  |  1.18    Ca    8.9      2020 23:40  Phos  3.5     -  Mg     1.7         TPro  5.7<L>  /  Alb  3.5  /  TBili  0.3  /  DBili  x   /  AST  21  /  ALT  9<L>  /  AlkPhos  59      PT/INR - ( 2020 00:03 )   PT: 16.0 sec;   INR: 1.39 ratio         PTT - ( 2020 00:03 )  PTT:25.8 sec      Urinalysis Basic - ( 2020 19:29 )    Color: Yellow / Appearance: Clear / S.015 / pH: x  Gluc: x / Ketone: Negative  / Bili: Negative / Urobili: Negative   Blood: x / Protein: 15 / Nitrite: Negative   Leuk Esterase: Negative / RBC: 0-2 /HPF / WBC 0-2   Sq Epi: x / Non Sq Epi: Occasional / Bacteria: Occasional        RADIOLOGY & ADDITIONAL TESTS:  Studies reviewed. HPI:  Patient is a 60 y/o M w/ a PMHx of AML s/p haplo (son) SCT on 10/9/19, DVT/PE (not on AC due to thrombocytopenia), HTN, factor V Leiden, and HFrEF (EF ~ 24% on 9/3/19) who initially presented to [facility] for fever, cough, and shortness of breath, admitted for sepsis 2/2 multifocal PNA, and was then transferred to [facility] for further management. Patient was in his usual state of health until 3 days ago when he began to develop fever (Tmax of 102.4F at home), cough, and shortness of breath. His SpO2 was reportedly 86% on RA. Patient then went to [facility] on  for further evaluation. While at [place], patient required supplemental O2 for hypoxemia. A CXR was obtained which showed left perihilar including upper and lower lung zone patchy airspace consolidation suspicious for multilobar pneumonia. Blood and urine cultures were sent and have had NGTD thus far. RVP was negative. Patient was ultimately admitted to Medicine for further management. He was started on IV antibiotics with clinical improved. Given his history of SCT, he was transferred to [facility] on  for further management. Given patient's history of AML s/p SCT, Hematology/BMT was consulted for further evaluation.    Patient seen and examined. He was sitting in a chair at time of visit. He reports that he feels much better currently since he initially presented to [facility]. His shortness of breath and cough are improved. No complaints of chest pain, nausea, vomiting, diarrhea, abdominal pain, or dysuria. Patient was afebrile overnight.    Hematologic History:  Patient has a history of AML (Dx 2019) s/p induction chemotherapy with Daunorubicin/Cytarabine and HIDAC consolidation chemotherapy. He then underwent Haplo identical stem cell transplant from son on 10/9/19. He remains on immunosuppressive medications since his SCT. Prior to admission, patient was     PAST MEDICAL & SURGICAL HISTORY:  COLLEEN (obstructive sleep apnea)  Pulmonary embolism  Deep vein thrombosis (DVT)  H/O cardiomyopathy  Factor 5 Leiden mutation, heterozygous  No significant past surgical history: bone marrow transplant      Review of Systems:   CONSTITUTIONAL: + Fever, No chills  EYES: No eye pain or discharge  ENMT: No sinus or throat pain  NECK: No pain or stiffness  RESPIRATORY: + Shortness of breath (improved), + Cough (improved)  CARDIOVASCULAR: No chest pain or palpitations  GASTROINTESTINAL: No nausea, vomiting, or abdominal pain  GENITOURINARY: No dysuria or hematuria   NEUROLOGICAL: No headache or syncope  SKIN: + Rash (improved), No itching   MUSCULOSKELETAL: No joint pain or back pain  Allergies    No Known Allergies    Intolerances    Social History: Former smoker, Occasional marijuana use, No EtOH use    FAMILY HISTORY:  FH: heart attack: father, mother, and brother      MEDICATIONS  (STANDING):  ALPRAZolam 0.5 milliGRAM(s) Oral at bedtime  atovaquone Suspension 750 milliGRAM(s) Oral two times a day  finasteride 5 milliGRAM(s) Oral daily  folic acid 1 milliGRAM(s) Oral daily  metoprolol succinate ER 12.5 milliGRAM(s) Oral daily  multivitamin 1 Tablet(s) Oral daily  pantoprazole    Tablet 40 milliGRAM(s) Oral before breakfast  piperacillin/tazobactam IVPB.. 3.375 Gram(s) IV Intermittent every 8 hours  predniSONE   Tablet 10 milliGRAM(s) Oral daily  sertraline 50 milliGRAM(s) Oral daily  tacrolimus 0.5 milliGRAM(s) Oral daily  tamsulosin 0.4 milliGRAM(s) Oral at bedtime  ursodiol Capsule 300 milliGRAM(s) Oral two times a day  valGANciclovir 450 milliGRAM(s) Oral daily  vancomycin  IVPB 1000 milliGRAM(s) IV Intermittent every 12 hours  voriconazole 200 milliGRAM(s) Oral every 12 hours    MEDICATIONS  (PRN):        CAPILLARY BLOOD GLUCOSE        I&O's Summary    2020 07:01  -  2020 14:22  --------------------------------------------------------  IN: 0 mL / OUT: 120 mL / NET: -120 mL    Vital Signs Last 24 Hrs  T(C): 36.7 (2020 08:57), Max: 37.3 (2020 19:00)  T(F): 98 (2020 08:57), Max: 99.2 (2020 19:00)  HR: 95 (2020 08:57) (92 - 101)  BP: 130/79 (2020 08:57) (124/77 - 137/88)  BP(mean): --  RR: 18 (2020 08:57) (18 - 18)  SpO2: 90% (2020 08:57) (90% - 93%)    PHYSICAL EXAM:  GENERAL: NAD, Sitting in a chair  HEENT: NC/AT, MMM  NECK: Supple  CHEST/LUNG: Grossly CTAB; No wheeze appreciated  HEART: RRR; +S1/S2  ABDOMEN: +BS, Soft, NT, No rigidity  EXTREMITIES: No LE edema  NEUROLOGY: Awake and alert, Answering questions and following commands appropriately  SKIN: Warm and dry, Mild erythema seen behind neck (improved per patient)  PSYCH: Calm and cooperative    LABS:                        9.6    6.37  )-----------( 40       ( 2020 23:40 )             30.3     01-29    135  |  100  |  21  ----------------------------<  120<H>  4.7   |  23  |  1.18    Ca    8.9      2020 23:40  Phos  3.5     -  Mg     1.7         TPro  5.7<L>  /  Alb  3.5  /  TBili  0.3  /  DBili  x   /  AST  21  /  ALT  9<L>  /  AlkPhos  59  -29    PT/INR - ( 2020 00:03 )   PT: 16.0 sec;   INR: 1.39 ratio         PTT - ( 2020 00:03 )  PTT:25.8 sec      Urinalysis Basic - ( 2020 19:29 )    Color: Yellow / Appearance: Clear / S.015 / pH: x  Gluc: x / Ketone: Negative  / Bili: Negative / Urobili: Negative   Blood: x / Protein: 15 / Nitrite: Negative   Leuk Esterase: Negative / RBC: 0-2 /HPF / WBC 0-2   Sq Epi: x / Non Sq Epi: Occasional / Bacteria: Occasional        RADIOLOGY & ADDITIONAL TESTS:  Studies reviewed. HPI:  Patient is a 58 y/o M w/ a PMHx of AML s/p haplo (son) SCT on 10/9/19, DVT/PE (not on AC due to thrombocytopenia), HTN, factor V Leiden, and HFrEF (EF ~ 24% on 9/3/19) who initially presented to [facility] for fever, cough, and shortness of breath, admitted for sepsis 2/2 multifocal PNA, and was then transferred to [facility] for further management. Patient was in his usual state of health until 3 days ago when he began to develop fever (Tmax of 102.4F at home), cough, and shortness of breath. His SpO2 was reportedly 86% on RA. Patient then went to [facility] on  for further evaluation. While at [place], patient required supplemental O2 for hypoxemia. A CXR was obtained which showed left perihilar including upper and lower lung zone patchy airspace consolidation suspicious for multilobar pneumonia. Blood and urine cultures were sent and have had NGTD thus far. RVP was negative. Patient was ultimately admitted to Medicine for further management. He was started on IV antibiotics with clinical improved. Given his history of SCT, he was transferred to [facility] on  for further management. Given patient's history of AML s/p SCT, Hematology/BMT was consulted for further evaluation.    Patient seen and examined. He was sitting in a chair at time of visit. He reports that he feels much better currently since he initially presented to [facility]. His shortness of breath and cough are improved. No complaints of chest pain, nausea, vomiting, diarrhea, abdominal pain, or dysuria. Patient was afebrile overnight.    Hematologic History:  Patient has a history of AML (Dx 2019) s/p induction chemotherapy with Daunorubicin/Cytarabine and HIDAC consolidation chemotherapy. He then underwent haplo-identical stem cell transplant from son on 10/9/19. He remains on immunosuppressive medications since his SCT. Prior to admission, patient was taking Tacrolimus 0.5mg QD as well as Mepron, Voriconazole, and Valganciclovir. He was also started on Prednisone 10mg QD for skin GVHD on  which has provided relief. Patient follows with Dr. Gaytan at [facility].    Recent labs were reviewed. Tacrolimus level on  was 10.4 and CMV PCR was negative on .    PAST MEDICAL & SURGICAL HISTORY:  COLLEEN (obstructive sleep apnea)  Pulmonary embolism  Deep vein thrombosis (DVT)  H/O cardiomyopathy  Factor 5 Leiden mutation, heterozygous  No significant past surgical history: bone marrow transplant      Review of Systems:   CONSTITUTIONAL: + Fever, No chills  EYES: No eye pain or discharge  ENMT: No sinus or throat pain  NECK: No pain or stiffness  RESPIRATORY: + Shortness of breath (improved), + Cough (improved)  CARDIOVASCULAR: No chest pain or palpitations  GASTROINTESTINAL: No nausea, vomiting, or abdominal pain  GENITOURINARY: No dysuria or hematuria   NEUROLOGICAL: No headache or syncope  SKIN: + Rash (improved), No itching   MUSCULOSKELETAL: No joint pain or back pain  Allergies    No Known Allergies    Intolerances    Social History: Former smoker, Occasional marijuana use, No EtOH use    FAMILY HISTORY:  FH: heart attack: father, mother, and brother      MEDICATIONS  (STANDING):  ALPRAZolam 0.5 milliGRAM(s) Oral at bedtime  atovaquone Suspension 750 milliGRAM(s) Oral two times a day  finasteride 5 milliGRAM(s) Oral daily  folic acid 1 milliGRAM(s) Oral daily  metoprolol succinate ER 12.5 milliGRAM(s) Oral daily  multivitamin 1 Tablet(s) Oral daily  pantoprazole    Tablet 40 milliGRAM(s) Oral before breakfast  piperacillin/tazobactam IVPB.. 3.375 Gram(s) IV Intermittent every 8 hours  predniSONE   Tablet 10 milliGRAM(s) Oral daily  sertraline 50 milliGRAM(s) Oral daily  tacrolimus 0.5 milliGRAM(s) Oral daily  tamsulosin 0.4 milliGRAM(s) Oral at bedtime  ursodiol Capsule 300 milliGRAM(s) Oral two times a day  valGANciclovir 450 milliGRAM(s) Oral daily  vancomycin  IVPB 1000 milliGRAM(s) IV Intermittent every 12 hours  voriconazole 200 milliGRAM(s) Oral every 12 hours    MEDICATIONS  (PRN):        CAPILLARY BLOOD GLUCOSE        I&O's Summary    2020 07:01  -  2020 14:22  --------------------------------------------------------  IN: 0 mL / OUT: 120 mL / NET: -120 mL    Vital Signs Last 24 Hrs  T(C): 36.7 (2020 08:57), Max: 37.3 (2020 19:00)  T(F): 98 (2020 08:57), Max: 99.2 (2020 19:00)  HR: 95 (2020 08:57) (92 - 101)  BP: 130/79 (2020 08:57) (124/77 - 137/88)  BP(mean): --  RR: 18 (2020 08:57) (18 - 18)  SpO2: 90% (2020 08:57) (90% - 93%)    PHYSICAL EXAM:  GENERAL: NAD, Sitting in a chair  HEENT: NC/AT, MMM  NECK: Supple  CHEST/LUNG: Grossly CTAB; No wheeze appreciated  HEART: RRR; +S1/S2  ABDOMEN: +BS, Soft, NT, No rigidity  EXTREMITIES: No LE edema  NEUROLOGY: Awake and alert, Answering questions and following commands appropriately  SKIN: Warm and dry, Mild erythema seen behind neck (improved per patient)  PSYCH: Calm and cooperative    LABS:                        9.6    6.37  )-----------( 40       ( 2020 23:40 )             30.3     01-    135  |  100  |  21  ----------------------------<  120<H>  4.7   |  23  |  1.18    Ca    8.9      2020 23:40  Phos  3.5     -  Mg     1.7         TPro  5.7<L>  /  Alb  3.5  /  TBili  0.3  /  DBili  x   /  AST  21  /  ALT  9<L>  /  AlkPhos  59  01-29    PT/INR - ( 2020 00:03 )   PT: 16.0 sec;   INR: 1.39 ratio         PTT - ( 2020 00:03 )  PTT:25.8 sec      Urinalysis Basic - ( 2020 19:29 )    Color: Yellow / Appearance: Clear / S.015 / pH: x  Gluc: x / Ketone: Negative  / Bili: Negative / Urobili: Negative   Blood: x / Protein: 15 / Nitrite: Negative   Leuk Esterase: Negative / RBC: 0-2 /HPF / WBC 0-2   Sq Epi: x / Non Sq Epi: Occasional / Bacteria: Occasional        RADIOLOGY & ADDITIONAL TESTS:  Studies reviewed. HPI:  Patient is a 60 y/o M w/ a PMHx of AML s/p haplo (son) SCT on 10/9/19, DVT/PE (not on AC due to thrombocytopenia), HTN, factor V Leiden, and HFrEF (EF ~ 24% on 9/3/19) who initially presented to [facility] for fever, cough, and shortness of breath, admitted for sepsis 2/2 multifocal PNA, and was then transferred to [facility] for further management. Patient was in his usual state of health until 3 days ago when he began to develop fever (Tmax of 102.4F at home), cough, and shortness of breath. His SpO2 was reportedly 86% on RA. Patient then went to [facility] on  for further evaluation. While at [place], patient required supplemental O2 for hypoxemia. A CXR was obtained which showed left perihilar including upper and lower lung zone patchy airspace consolidation suspicious for multilobar pneumonia. Blood and urine cultures were sent and have had NGTD thus far. RVP was negative. Patient was ultimately admitted to Medicine for further management. He was started on IV antibiotics with clinical improved. Given his history of SCT, he was transferred to [facility] on  for further management. Given patient's history of AML s/p SCT, Hematology/BMT was consulted for further evaluation.    Patient seen and examined. He was sitting in a chair at time of visit. He reports that he feels much better currently since he initially presented to [facility]. His shortness of breath and cough are improved. No complaints of chest pain, nausea, vomiting, diarrhea, abdominal pain, or dysuria. Patient was afebrile overnight.    Hematologic History:  Patient has a history of AML (Dx 2019) s/p induction chemotherapy with Daunorubicin/Cytarabine and HIDAC consolidation chemotherapy. He then underwent haplo-identical stem cell transplant from son on 10/9/19. He remains on immunosuppressive medications since his SCT. Prior to admission, patient was taking Tacrolimus 0.5mg QD as well as Mepron, Voriconazole, and Valganciclovir. He was also started on Prednisone 10mg QD for skin GVHD on  which has provided relief. Patient follows with Dr. Gaytan at [facility].    Recent labs were reviewed. Tacrolimus level on  was 10.4 and CMV PCR was negative on . Chimerism on 20 was 100% donor    PAST MEDICAL & SURGICAL HISTORY:  COLLEEN (obstructive sleep apnea)  Pulmonary embolism  Deep vein thrombosis (DVT)  H/O cardiomyopathy  Factor 5 Leiden mutation, heterozygous  No significant past surgical history: bone marrow transplant      Review of Systems:   CONSTITUTIONAL: + Fever, No chills  EYES: No eye pain or discharge  ENMT: No sinus or throat pain  NECK: No pain or stiffness  RESPIRATORY: + Shortness of breath (improved), + Cough (improved)  CARDIOVASCULAR: No chest pain or palpitations  GASTROINTESTINAL: No nausea, vomiting, or abdominal pain  GENITOURINARY: No dysuria or hematuria   NEUROLOGICAL: No headache or syncope  SKIN: + Rash (improved), No itching   MUSCULOSKELETAL: No joint pain or back pain  Allergies    No Known Allergies    Intolerances    Social History: Former smoker, Occasional marijuana use, No EtOH use    FAMILY HISTORY:  FH: heart attack: father, mother, and brother      MEDICATIONS  (STANDING):  ALPRAZolam 0.5 milliGRAM(s) Oral at bedtime  atovaquone Suspension 750 milliGRAM(s) Oral two times a day  finasteride 5 milliGRAM(s) Oral daily  folic acid 1 milliGRAM(s) Oral daily  metoprolol succinate ER 12.5 milliGRAM(s) Oral daily  multivitamin 1 Tablet(s) Oral daily  pantoprazole    Tablet 40 milliGRAM(s) Oral before breakfast  piperacillin/tazobactam IVPB.. 3.375 Gram(s) IV Intermittent every 8 hours  predniSONE   Tablet 10 milliGRAM(s) Oral daily  sertraline 50 milliGRAM(s) Oral daily  tacrolimus 0.5 milliGRAM(s) Oral daily  tamsulosin 0.4 milliGRAM(s) Oral at bedtime  ursodiol Capsule 300 milliGRAM(s) Oral two times a day  valGANciclovir 450 milliGRAM(s) Oral daily  vancomycin  IVPB 1000 milliGRAM(s) IV Intermittent every 12 hours  voriconazole 200 milliGRAM(s) Oral every 12 hours    MEDICATIONS  (PRN):        CAPILLARY BLOOD GLUCOSE        I&O's Summary    2020 07:01  -  2020 14:22  --------------------------------------------------------  IN: 0 mL / OUT: 120 mL / NET: -120 mL    Vital Signs Last 24 Hrs  T(C): 36.7 (2020 08:57), Max: 37.3 (2020 19:00)  T(F): 98 (2020 08:57), Max: 99.2 (2020 19:00)  HR: 95 (2020 08:57) (92 - 101)  BP: 130/79 (2020 08:57) (124/77 - 137/88)  BP(mean): --  RR: 18 (2020 08:57) (18 - 18)  SpO2: 90% (2020 08:57) (90% - 93%)    PHYSICAL EXAM:  GENERAL: NAD, Sitting in a chair  HEENT: NC/AT, MMM  NECK: Supple  CHEST/LUNG: Grossly CTAB; No wheeze appreciated  HEART: RRR; +S1/S2  ABDOMEN: +BS, Soft, NT, No rigidity  EXTREMITIES: No LE edema  NEUROLOGY: Awake and alert, Answering questions and following commands appropriately  SKIN: Warm and dry, Mild erythema seen behind neck (improved per patient)  PSYCH: Calm and cooperative    LABS:                        9.6    6.37  )-----------( 40       ( 2020 23:40 )             30.3         135  |  100  |  21  ----------------------------<  120<H>  4.7   |  23  |  1.18    Ca    8.9      2020 23:40  Phos  3.5       Mg     1.7         TPro  5.7<L>  /  Alb  3.5  /  TBili  0.3  /  DBili  x   /  AST  21  /  ALT  9<L>  /  AlkPhos  59  29    PT/INR - ( 2020 00:03 )   PT: 16.0 sec;   INR: 1.39 ratio         PTT - ( 2020 00:03 )  PTT:25.8 sec      Urinalysis Basic - ( 2020 19:29 )    Color: Yellow / Appearance: Clear / S.015 / pH: x  Gluc: x / Ketone: Negative  / Bili: Negative / Urobili: Negative   Blood: x / Protein: 15 / Nitrite: Negative   Leuk Esterase: Negative / RBC: 0-2 /HPF / WBC 0-2   Sq Epi: x / Non Sq Epi: Occasional / Bacteria: Occasional        RADIOLOGY & ADDITIONAL TESTS:  Studies reviewed.

## 2020-01-30 NOTE — CONSULT NOTE ADULT - ASSESSMENT
Mr. Delong is a 59 year old male, with previously diagnosed acute myeloid leukemia. s/p induction chemotherapy with Daunorubicin/Cytarabine and  HIDAC consolidation chemotherapy, s/p  Haplo-identical stem cell transplant from son. Other history includes non-ischemic cardiomyopathy (recent EF 25% 9/19/19), COLLEEN, DVT/PE, HTN, Factor V Leiden and amputation of right toe due to osteomyelitis.   He has been admitted for pneumonia.    - Known LV dysfunction. Cardiac MRI in 9/2029 with an EF on 24 %, with mid wall myocardial late gadolinium enhancement at the base involving   the anteroseptal and inferoseptal wall  associated with a nonischemic cardiomyopathy.  - No evidence of volume overload at present. Continue to maintain strict I's and O's, and to monitor for signs of volume overload, which he is at risk for.     - EKG continues to demonstrate a SR, with RBBB, LAD and non-specific ST-T abn  - No evidence of acute ischemia  - BP is overall stable  - Continue BB at current doses as tolerated by BP.    - Hx of dvt/pe but can not be AC at this time 2/2 low platelets  - Monitor and replete lytes  - To follow while admitted.  - Other cardiovascular workup will depend on clinical course.  - All other workup per primary team.

## 2020-01-30 NOTE — CONSULT NOTE ADULT - ASSESSMENT
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 Hematology/BMT consulted for further evaluation.    # Multifocal PNA - Improving  - Patient initially presented to [facility] with fever, cough, and shortness of breath and was ultimately found to have imaging findings concerning for multifocal PNA on CXR. Blood and urine cultures have had NGTD thus far. RVP was negative.  - C/w IV antibiotics as per primary team. Follow-up cultures  - Monitor CBC and fever curve    # Anemia  - Patient has had ongoing anemia since his recent SCT. His Hgb is currently slightly lower than baseline, but it is improved today. Suspect there was an element of bone marrow suppression in the setting of recent illness  - MCV noted to be steadily uptrending. Recommend checking Vitamin B12 and Folate  - Monitor CBC w/ diff    # Thrombocytopenia  - Patient has had ongoing thrombocytopenia since his recent SCT. His PLT count is currently near his recent baseline  - Patient with a history of DVT/PE in the past and Factor V Leiden. AC is currently on hold in the setting of thrombocytopenia  - Monitor CBC     # AML s/p SCT  - Patient recently underwent a haplo-identical stem cell transplant from son on 10/9/19. Chimerism on 1/8/20 was 100% donor  - Recent Tacrolimus level was noted. Please continue home Tacrolimus 0.5mg QD for now and repeat a Tacrolimus level in the AM.  - Continue home Mepron, Voriconazole, and Valganciclovir. Of note, CMV PCR was negative on 1/28.  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 Continue Prednisone 10mg QD  - Monitor CBC w/ diff daily and transfuse for Hgb < 7, PLT < 10 (if afebrile), or < 15 (if febrile)  - Primary Hematologist: Dr. 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 Hematology/BMT consulted for further evaluation.    # Multifocal PNA - Improving  - Patient initially presented to [facility] with fever, cough, and shortness of breath and was ultimately found to have imaging findings concerning for multifocal PNA on CXR. Blood and urine cultures have had NGTD thus far. RVP was negative.  - C/w IV antibiotics as per primary team. Follow-up cultures  - Monitor CBC and fever curve    # Anemia  - Patient has had ongoing anemia since his recent SCT. His Hgb has been slightly lower than baseline, but it is improved currently. Suspect there was an element of bone marrow suppression in the setting of recent illness  - MCV noted to be steadily uptrending. Recommend checking Vitamin B12 and Folate  - Monitor CBC w/ diff    # Thrombocytopenia  - Patient has had ongoing thrombocytopenia since his recent SCT. His PLT count is currently near his recent baseline  - Patient with a history of DVT/PE in the past and Factor V Leiden. AC is currently on hold in the setting of thrombocytopenia  - Monitor CBC     # AML s/p SCT  - Patient recently underwent a haplo-identical stem cell transplant from son on 10/9/19. Chimerism on 1/8/20 was 100% donor  - Recent Tacrolimus level was noted. Please continue home Tacrolimus 0.5mg QD for now and repeat a Tacrolimus level in the AM.  - Continue home Mepron, Voriconazole, and Valganciclovir. Of note, CMV PCR was negative on 1/28.  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 Continue Prednisone 10mg QD  - Monitor CBC w/ diff daily and transfuse for Hgb < 7, PLT < 10 (if afebrile), or < 15 (if febrile)  - Primary Hematologist: Dr. Lukas Waller, PGY4  Hematology-Oncology Fellow  Pager: [phone]

## 2020-01-30 NOTE — PROGRESS NOTE ADULT - SUBJECTIVE AND OBJECTIVE BOX
Patient is a 59y old  Male who presents with a chief complaint of Cough, fever (2020 14:21)    SUBJECTIVE / OVERNIGHT EVENTS: no acute events overnight     MEDICATIONS  (STANDING):  ALPRAZolam 0.5 milliGRAM(s) Oral at bedtime  atovaquone Suspension 750 milliGRAM(s) Oral two times a day  finasteride 5 milliGRAM(s) Oral daily  folic acid 1 milliGRAM(s) Oral daily  metoprolol succinate ER 12.5 milliGRAM(s) Oral daily  multivitamin 1 Tablet(s) Oral daily  pantoprazole    Tablet 40 milliGRAM(s) Oral before breakfast  piperacillin/tazobactam IVPB.. 3.375 Gram(s) IV Intermittent every 8 hours  predniSONE   Tablet 10 milliGRAM(s) Oral daily  sertraline 50 milliGRAM(s) Oral daily  tacrolimus 0.5 milliGRAM(s) Oral daily  tamsulosin 0.4 milliGRAM(s) Oral at bedtime  ursodiol Capsule 300 milliGRAM(s) Oral two times a day  valGANciclovir 450 milliGRAM(s) Oral daily  vancomycin  IVPB 1000 milliGRAM(s) IV Intermittent every 12 hours  voriconazole 200 milliGRAM(s) Oral every 12 hours    MEDICATIONS  (PRN):      Vital Signs Last 24 Hrs  T(C): 36.7 (2020 08:57), Max: 37.3 (2020 19:00)  T(F): 98 (2020 08:57), Max: 99.2 (2020 19:00)  HR: 95 (2020 08:57) (92 - 101)  BP: 130/79 (2020 08:57) (124/77 - 137/88)  BP(mean): --  RR: 18 (2020 08:57) (18 - 18)  SpO2: 90% (2020 08:57) (90% - 93%)  CAPILLARY BLOOD GLUCOSE        I&O's Summary    2020 07:01  -  2020 15:50  --------------------------------------------------------  IN: 0 mL / OUT: 120 mL / NET: -120 mL        PHYSICAL EXAM:  GENERAL: NAD  EYES: conjunctiva and sclera clear  NECK: Supple, No JVD  CHEST/LUNG: b/l ronchi noted   HEART: +S1/S2, reg   ABDOMEN: Soft, Nontender, Nondistended; Bowel sounds present  EXTREMITIES: no edema noted   PSYCH: AAOx3      LABS:                        9.6    6.37  )-----------( 40       ( 2020 23:40 )             30.3         135  |  100  |  21  ----------------------------<  120<H>  4.7   |  23  |  1.18    Ca    8.9      2020 23:40  Phos  3.5       Mg     1.7         TPro  5.7<L>  /  Alb  3.5  /  TBili  0.3  /  DBili  x   /  AST  21  /  ALT  9<L>  /  AlkPhos  59      PT/INR - ( 2020 00:03 )   PT: 16.0 sec;   INR: 1.39 ratio         PTT - ( 2020 00:03 )  PTT:25.8 sec      Urinalysis Basic - ( 2020 19:29 )    Color: Yellow / Appearance: Clear / S.015 / pH: x  Gluc: x / Ketone: Negative  / Bili: Negative / Urobili: Negative   Blood: x / Protein: 15 / Nitrite: Negative   Leuk Esterase: Negative / RBC: 0-2 /HPF / WBC 0-2   Sq Epi: x / Non Sq Epi: Occasional / Bacteria: Occasional

## 2020-01-30 NOTE — CONSULT NOTE ADULT - SUBJECTIVE AND OBJECTIVE BOX
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 Patient initially presented with worsening nonproductive cough, orthopnea, and fever, found to have fever of 102 F and hypoxic respiratory failure with SO2 at 86% on RA. CXR showed multifocal PNA, mostly in CARLENE and LLL. ICU was also consulted at [facility] for hypoxic respiratory failure and POCUS showed diffuse BL Bines and BL pleural effusions. He was treated with vancomyicn, zosyn, and lasix. He was then transferred to [facility] for further evaluation.  Currently patient states he feels better, cough and SOB has improved.  He is also urinating well. (29 Jan 2020 22:41)      PAST MEDICAL & SURGICAL HISTORY:  COLLEEN (obstructive sleep apnea)  Pulmonary embolism  Deep vein thrombosis (DVT)  H/O cardiomyopathy  Factor 5 Leiden mutation, heterozygous  No significant past surgical history: bone marrow transplant      SOCIAL HISTORY:  No tobacco, ethanol, or drug abuse.    FAMILY HISTORY:  FH: heart attack: father, mother, and brother    No family history of acute MI or sudden cardiac death.    MEDICATIONS  (STANDING):  ALPRAZolam 0.5 milliGRAM(s) Oral at bedtime  atovaquone Suspension 750 milliGRAM(s) Oral two times a day  finasteride 5 milliGRAM(s) Oral daily  folic acid 1 milliGRAM(s) Oral daily  metoprolol succinate ER 12.5 milliGRAM(s) Oral daily  multivitamin 1 Tablet(s) Oral daily  pantoprazole    Tablet 40 milliGRAM(s) Oral before breakfast  piperacillin/tazobactam IVPB.. 3.375 Gram(s) IV Intermittent every 8 hours  predniSONE   Tablet 10 milliGRAM(s) Oral daily  sertraline 50 milliGRAM(s) Oral daily  tacrolimus 0.5 milliGRAM(s) Oral daily  tamsulosin 0.4 milliGRAM(s) Oral at bedtime  ursodiol Capsule 300 milliGRAM(s) Oral two times a day  vancomycin  IVPB 1000 milliGRAM(s) IV Intermittent every 12 hours  voriconazole 200 milliGRAM(s) Oral every 12 hours    MEDICATIONS  (PRN):      Allergies    No Known Allergies    Intolerances        REVIEW OF SYSTEMS:    CONSTITUTIONAL: + weakness, no fevers or chills  EYES/ENT: No visual changes;  No vertigo or throat pain   NECK: No pain or stiffness  RESPIRATORY: No cough, wheezing, hemoptysis; + shortness of breath  CARDIOVASCULAR: No chest pain or palpitations  GASTROINTESTINAL: No abdominal pain. No nausea, vomiting, or hematemesis; No diarrhea or constipation. No melena or hematochezia.  GENITOURINARY: No dysuria, frequency or hematuria  NEUROLOGICAL: No numbness or weakness  SKIN: No itching or rash  All other review of systems is negative unless indicated above    VITAL SIGNS:   Vital Signs Last 24 Hrs  T(C): 36.7 (30 Jan 2020 08:57), Max: 37.3 (29 Jan 2020 19:00)  T(F): 98 (30 Jan 2020 08:57), Max: 99.2 (29 Jan 2020 19:00)  HR: 95 (30 Jan 2020 08:57) (92 - 101)  BP: 130/79 (30 Jan 2020 08:57) (124/77 - 137/88)  BP(mean): --  RR: 18 (30 Jan 2020 08:57) (18 - 18)  SpO2: 90% (30 Jan 2020 08:57) (90% - 93%)    I&O's Summary      On Exam:    Constitutional: NAD, alert and oriented x 3, on oxygen  Lungs:  Non-labored, decreased bs edilson, No wheezing, rales or rhonchi  Cardiovascular: RRR.  S1 and S2 positive.  No murmurs, rubs, gallops or clicks  Gastrointestinal: Bowel Sounds present, soft, nontender.   Lymph: No peripheral edema. No cervical lymphadenopathy.  Neurological: Alert, no focal deficits  Skin: No rashes or ulcers   Psych:  Mood & affect appropriate.    LABS: All Labs Reviewed:                        9.6    6.37  )-----------( 40       ( 29 Jan 2020 23:40 )             30.3                         8.8    5.66  )-----------( 36       ( 29 Jan 2020 04:46 )             26.7                         8.8    7.73  )-----------( 35       ( 28 Jan 2020 00:25 )             27.1     29 Jan 2020 23:40    135    |  100    |  21     ----------------------------<  120    4.7     |  23     |  1.18   29 Jan 2020 04:46    136    |  104    |  23     ----------------------------<  113    4.0     |  24     |  1.00   28 Jan 2020 00:25    137    |  108    |  22     ----------------------------<  145    4.8     |  21     |  1.10     Ca    8.9        29 Jan 2020 23:40  Ca    8.4        29 Jan 2020 04:46  Ca    8.7        28 Jan 2020 00:25  Phos  3.5       29 Jan 2020 23:40  Mg     1.7       29 Jan 2020 23:40    TPro  5.7    /  Alb  3.5    /  TBili  0.3    /  DBili  x      /  AST  21     /  ALT  9      /  AlkPhos  59     29 Jan 2020 23:40  TPro  5.8    /  Alb  3.0    /  TBili  0.6    /  DBili  x      /  AST  13     /  ALT  10     /  AlkPhos  66     28 Jan 2020 00:25  TPro  6.2    /  Alb  3.3    /  TBili  0.6    /  DBili  x      /  AST  16     /  ALT  11     /  AlkPhos  70     27 Jan 2020 19:52    PT/INR - ( 30 Jan 2020 00:03 )   PT: 16.0 sec;   INR: 1.39 ratio         PTT - ( 30 Jan 2020 00:03 )  PTT:25.8 sec      Blood Culture: Organism --  Gram Stain Blood -- Gram Stain --  Specimen Source .Urine Clean Catch (Midstream)  Culture-Blood --    Organism --  Gram Stain Blood -- Gram Stain --  Specimen Source .Blood Blood-Peripheral  Culture-Blood --            RADIOLOGY:    EKG: sr, rbbb, lad, st-t wave abn

## 2020-01-31 ENCOUNTER — APPOINTMENT (OUTPATIENT)
Dept: HEMATOLOGY ONCOLOGY | Facility: CLINIC | Age: 60
End: 2020-01-31

## 2020-01-31 LAB
ANISOCYTOSIS BLD QL: SIGNIFICANT CHANGE UP
BASOPHILS # BLD AUTO: 0 K/UL — SIGNIFICANT CHANGE UP (ref 0–0.2)
BASOPHILS NFR BLD AUTO: 0 % — SIGNIFICANT CHANGE UP (ref 0–2)
DACRYOCYTES BLD QL SMEAR: SIGNIFICANT CHANGE UP
ELLIPTOCYTES BLD QL SMEAR: SLIGHT — SIGNIFICANT CHANGE UP
EOSINOPHIL # BLD AUTO: 0.23 K/UL — SIGNIFICANT CHANGE UP (ref 0–0.5)
EOSINOPHIL NFR BLD AUTO: 4.5 % — SIGNIFICANT CHANGE UP (ref 0–6)
FOLATE SERPL-MCNC: >20 NG/ML — SIGNIFICANT CHANGE UP
HCT VFR BLD CALC: 28.1 % — LOW (ref 39–50)
HGB BLD-MCNC: 8.9 G/DL — LOW (ref 13–17)
LYMPHOCYTES # BLD AUTO: 0.73 K/UL — LOW (ref 1–3.3)
LYMPHOCYTES # BLD AUTO: 14.3 % — SIGNIFICANT CHANGE UP (ref 13–44)
MACROCYTES BLD QL: SIGNIFICANT CHANGE UP
MANUAL SMEAR VERIFICATION: SIGNIFICANT CHANGE UP
MCHC RBC-ENTMCNC: 31.7 GM/DL — LOW (ref 32–36)
MCHC RBC-ENTMCNC: 34.8 PG — HIGH (ref 27–34)
MCV RBC AUTO: 109.8 FL — HIGH (ref 80–100)
METAMYELOCYTES # FLD: 0.9 % — HIGH (ref 0–0)
MONOCYTES # BLD AUTO: 0.27 K/UL — SIGNIFICANT CHANGE UP (ref 0–0.9)
MONOCYTES NFR BLD AUTO: 5.3 % — SIGNIFICANT CHANGE UP (ref 2–14)
NEUTROPHILS # BLD AUTO: 3.34 K/UL — SIGNIFICANT CHANGE UP (ref 1.8–7.4)
NEUTROPHILS NFR BLD AUTO: 64.3 % — SIGNIFICANT CHANGE UP (ref 43–77)
NEUTS BAND # BLD: 0.9 % — SIGNIFICANT CHANGE UP (ref 0–8)
PLAT MORPH BLD: NORMAL — SIGNIFICANT CHANGE UP
PLATELET # BLD AUTO: 44 K/UL — LOW (ref 150–400)
POIKILOCYTOSIS BLD QL AUTO: SIGNIFICANT CHANGE UP
RBC # BLD: 2.56 M/UL — LOW (ref 4.2–5.8)
RBC # FLD: 15.6 % — HIGH (ref 10.3–14.5)
RBC BLD AUTO: ABNORMAL
TACROLIMUS SERPL-MCNC: 8.7 NG/ML — SIGNIFICANT CHANGE UP
VARIANT LYMPHS # BLD: 9.8 % — HIGH (ref 0–6)
VIT B12 SERPL-MCNC: 775 PG/ML — SIGNIFICANT CHANGE UP (ref 232–1245)
WBC # BLD: 5.13 K/UL — SIGNIFICANT CHANGE UP (ref 3.8–10.5)
WBC # FLD AUTO: 5.13 K/UL — SIGNIFICANT CHANGE UP (ref 3.8–10.5)

## 2020-01-31 PROCEDURE — 99232 SBSQ HOSP IP/OBS MODERATE 35: CPT

## 2020-01-31 PROCEDURE — 99233 SBSQ HOSP IP/OBS HIGH 50: CPT

## 2020-01-31 RX ADMIN — VALGANCICLOVIR 450 MILLIGRAM(S): 450 TABLET, FILM COATED ORAL at 12:41

## 2020-01-31 RX ADMIN — ATOVAQUONE 750 MILLIGRAM(S): 750 SUSPENSION ORAL at 17:16

## 2020-01-31 RX ADMIN — SERTRALINE 50 MILLIGRAM(S): 25 TABLET, FILM COATED ORAL at 12:41

## 2020-01-31 RX ADMIN — FINASTERIDE 5 MILLIGRAM(S): 5 TABLET, FILM COATED ORAL at 12:41

## 2020-01-31 RX ADMIN — URSODIOL 300 MILLIGRAM(S): 250 TABLET, FILM COATED ORAL at 08:39

## 2020-01-31 RX ADMIN — Medication 0.5 MILLIGRAM(S): at 22:39

## 2020-01-31 RX ADMIN — PANTOPRAZOLE SODIUM 40 MILLIGRAM(S): 20 TABLET, DELAYED RELEASE ORAL at 08:39

## 2020-01-31 RX ADMIN — URSODIOL 300 MILLIGRAM(S): 250 TABLET, FILM COATED ORAL at 17:16

## 2020-01-31 RX ADMIN — TAMSULOSIN HYDROCHLORIDE 0.4 MILLIGRAM(S): 0.4 CAPSULE ORAL at 22:39

## 2020-01-31 RX ADMIN — TACROLIMUS 0.5 MILLIGRAM(S): 5 CAPSULE ORAL at 12:42

## 2020-01-31 RX ADMIN — PIPERACILLIN AND TAZOBACTAM 25 GRAM(S): 4; .5 INJECTION, POWDER, LYOPHILIZED, FOR SOLUTION INTRAVENOUS at 10:36

## 2020-01-31 RX ADMIN — Medication 12.5 MILLIGRAM(S): at 08:38

## 2020-01-31 RX ADMIN — Medication 10 MILLIGRAM(S): at 08:39

## 2020-01-31 RX ADMIN — Medication 1 TABLET(S): at 12:41

## 2020-01-31 RX ADMIN — Medication 250 MILLIGRAM(S): at 08:40

## 2020-01-31 RX ADMIN — ATOVAQUONE 750 MILLIGRAM(S): 750 SUSPENSION ORAL at 08:37

## 2020-01-31 RX ADMIN — VORICONAZOLE 200 MILLIGRAM(S): 10 INJECTION, POWDER, LYOPHILIZED, FOR SOLUTION INTRAVENOUS at 08:40

## 2020-01-31 RX ADMIN — VORICONAZOLE 200 MILLIGRAM(S): 10 INJECTION, POWDER, LYOPHILIZED, FOR SOLUTION INTRAVENOUS at 17:16

## 2020-01-31 RX ADMIN — Medication 1 MILLIGRAM(S): at 12:41

## 2020-01-31 NOTE — PROGRESS NOTE ADULT - SUBJECTIVE AND OBJECTIVE BOX
[facility] - Ede Zamudio, Riki, Edgar, Gem, Tabitha Garcia  Office Number:  [phone]    Patient resting comfortably in bed in NAD.  Laying flat with no respiratory distress.  No complaints of chest pain, dyspnea, palpitations, PND, or orthopnea.    F/U for:  CHF    Telemetry:  OFF    MEDICATIONS  (STANDING):  ALPRAZolam 0.5 milliGRAM(s) Oral at bedtime  atovaquone Suspension 750 milliGRAM(s) Oral two times a day  finasteride 5 milliGRAM(s) Oral daily  folic acid 1 milliGRAM(s) Oral daily  metoprolol succinate ER 12.5 milliGRAM(s) Oral daily  multivitamin 1 Tablet(s) Oral daily  pantoprazole    Tablet 40 milliGRAM(s) Oral before breakfast  piperacillin/tazobactam IVPB.. 3.375 Gram(s) IV Intermittent every 8 hours  predniSONE   Tablet 10 milliGRAM(s) Oral daily  sertraline 50 milliGRAM(s) Oral daily  tacrolimus 0.5 milliGRAM(s) Oral daily  tamsulosin 0.4 milliGRAM(s) Oral at bedtime  ursodiol Capsule 300 milliGRAM(s) Oral two times a day  valGANciclovir 450 milliGRAM(s) Oral daily  vancomycin  IVPB 1000 milliGRAM(s) IV Intermittent every 12 hours  voriconazole 200 milliGRAM(s) Oral every 12 hours    MEDICATIONS  (PRN):      Allergies    No Known Allergies    Intolerances        Vital Signs Last 24 Hrs  T(C): 36.4 (31 Jan 2020 04:49), Max: 36.9 (31 Jan 2020 00:27)  T(F): 97.6 (31 Jan 2020 04:49), Max: 98.5 (31 Jan 2020 00:27)  HR: 87 (31 Jan 2020 04:49) (87 - 95)  BP: 123/78 (31 Jan 2020 04:49) (120/81 - 130/79)  BP(mean): --  RR: 18 (31 Jan 2020 04:49) (18 - 18)  SpO2: 92% (31 Jan 2020 04:49) (90% - 93%)    I&O's Summary    30 Jan 2020 07:01  -  31 Jan 2020 06:27  --------------------------------------------------------  IN: 0 mL / OUT: 120 mL / NET: -120 mL        ON EXAM:    General: NAD, awake and alert, oriented x 3  HEENT: Mucous membranes are moist, anicteric  Lungs: Non-labored, breath sounds are clear bilaterally, No wheezing, rales or rhonchi  Cardiovascular: Regular, S1 and S2, no murmurs, rubs, or gallops  Gastrointestinal: Bowel Sounds present, soft, nontender.   Lymph: No peripheral edema. No lymphadenopathy.  Skin: No rashes or ulcers  Psych:  Mood & affect appropriate    LABS: All Labs Reviewed:                        9.6    6.37  )-----------( 40       ( 29 Jan 2020 23:40 )             30.3                         8.8    5.66  )-----------( 36       ( 29 Jan 2020 04:46 )             26.7     29 Jan 2020 23:40    135    |  100    |  21     ----------------------------<  120    4.7     |  23     |  1.18   29 Jan 2020 04:46    136    |  104    |  23     ----------------------------<  113    4.0     |  24     |  1.00     Ca    8.9        29 Jan 2020 23:40  Ca    8.4        29 Jan 2020 04:46  Phos  3.5       29 Jan 2020 23:40  Mg     1.7       29 Jan 2020 23:40    TPro  5.7    /  Alb  3.5    /  TBili  0.3    /  DBili  x      /  AST  21     /  ALT  9      /  AlkPhos  59     29 Jan 2020 23:40    PT/INR - ( 30 Jan 2020 00:03 )   PT: 16.0 sec;   INR: 1.39 ratio         PTT - ( 30 Jan 2020 00:03 )  PTT:25.8 sec      Blood Culture: Organism --  Gram Stain Blood -- Gram Stain --  Specimen Source .Urine Clean Catch (Midstream)  Culture-Blood --    Organism --  Gram Stain Blood -- Gram Stain --  Specimen Source .Blood Blood-Peripheral  Culture-Blood --

## 2020-01-31 NOTE — PROGRESS NOTE ADULT - SUBJECTIVE AND OBJECTIVE BOX
Patient is a 59y old  Male who presents with a chief complaint of Cough, fever (31 Jan 2020 06:27)    SUBJECTIVE / OVERNIGHT EVENTS: no acute events overnight    MEDICATIONS  (STANDING):  ALPRAZolam 0.5 milliGRAM(s) Oral at bedtime  atovaquone Suspension 750 milliGRAM(s) Oral two times a day  finasteride 5 milliGRAM(s) Oral daily  folic acid 1 milliGRAM(s) Oral daily  metoprolol succinate ER 12.5 milliGRAM(s) Oral daily  multivitamin 1 Tablet(s) Oral daily  pantoprazole    Tablet 40 milliGRAM(s) Oral before breakfast  piperacillin/tazobactam IVPB.. 3.375 Gram(s) IV Intermittent every 8 hours  predniSONE   Tablet 10 milliGRAM(s) Oral daily  sertraline 50 milliGRAM(s) Oral daily  tacrolimus 0.5 milliGRAM(s) Oral daily  tamsulosin 0.4 milliGRAM(s) Oral at bedtime  ursodiol Capsule 300 milliGRAM(s) Oral two times a day  valGANciclovir 450 milliGRAM(s) Oral daily  vancomycin  IVPB 1000 milliGRAM(s) IV Intermittent every 12 hours  voriconazole 200 milliGRAM(s) Oral every 12 hours      Vital Signs Last 24 Hrs  T(C): 36.8 (31 Jan 2020 11:34), Max: 36.9 (31 Jan 2020 00:27)  T(F): 98.3 (31 Jan 2020 11:34), Max: 98.5 (31 Jan 2020 00:27)  HR: 87 (31 Jan 2020 13:56) (87 - 92)  BP: 104/70 (31 Jan 2020 13:56) (104/70 - 125/75)  BP(mean): --  RR: 18 (31 Jan 2020 13:56) (18 - 18)  SpO2: 92% (31 Jan 2020 13:56) (92% - 93%)  CAPILLARY BLOOD GLUCOSE        I&O's Summary    30 Jan 2020 07:01  -  31 Jan 2020 07:00  --------------------------------------------------------  IN: 0 mL / OUT: 120 mL / NET: -120 mL      PHYSICAL EXAM:  GENERAL: NAD  EYES:  conjunctiva and sclera clear  NECK: Supple, No JVD  CHEST/LUNG: Clear, no ronchi   HEART: +S1/S2, reg   ABDOMEN: Soft, Nontender, Nondistended; Bowel sounds present  EXTREMITIES:  No L  CH: AAOx3  NEUROLOGY: non-focal  SKIN: No rashes or lesions    LABS:                        8.9    5.13  )-----------( 44       ( 31 Jan 2020 07:13 )             28.1     01-29    135  |  100  |  21  ----------------------------<  120<H>  4.7   |  23  |  1.18    Ca    8.9      29 Jan 2020 23:40  Phos  3.5     01-29  Mg     1.7     01-29    TPro  5.7<L>  /  Alb  3.5  /  TBili  0.3  /  DBili  x   /  AST  21  /  ALT  9<L>  /  AlkPhos  59  01-29    PT/INR - ( 30 Jan 2020 00:03 )   PT: 16.0 sec;   INR: 1.39 ratio         PTT - ( 30 Jan 2020 00:03 )  PTT:25.8 sec          RADIOLOGY & ADDITIONAL TESTS:    Imaging Personally Reviewed:    Consultant(s) Notes Reviewed:      Care Discussed with Consultants/Other Providers: Patient is a 59y old  Male who presents with a chief complaint of Cough, fever (31 Jan 2020 06:27)    SUBJECTIVE / OVERNIGHT EVENTS: no acute events overnight    MEDICATIONS  (STANDING):  ALPRAZolam 0.5 milliGRAM(s) Oral at bedtime  atovaquone Suspension 750 milliGRAM(s) Oral two times a day  finasteride 5 milliGRAM(s) Oral daily  folic acid 1 milliGRAM(s) Oral daily  metoprolol succinate ER 12.5 milliGRAM(s) Oral daily  multivitamin 1 Tablet(s) Oral daily  pantoprazole    Tablet 40 milliGRAM(s) Oral before breakfast  piperacillin/tazobactam IVPB.. 3.375 Gram(s) IV Intermittent every 8 hours  predniSONE   Tablet 10 milliGRAM(s) Oral daily  sertraline 50 milliGRAM(s) Oral daily  tacrolimus 0.5 milliGRAM(s) Oral daily  tamsulosin 0.4 milliGRAM(s) Oral at bedtime  ursodiol Capsule 300 milliGRAM(s) Oral two times a day  valGANciclovir 450 milliGRAM(s) Oral daily  vancomycin  IVPB 1000 milliGRAM(s) IV Intermittent every 12 hours  voriconazole 200 milliGRAM(s) Oral every 12 hours      Vital Signs Last 24 Hrs  T(C): 36.8 (31 Jan 2020 11:34), Max: 36.9 (31 Jan 2020 00:27)  T(F): 98.3 (31 Jan 2020 11:34), Max: 98.5 (31 Jan 2020 00:27)  HR: 87 (31 Jan 2020 13:56) (87 - 92)  BP: 104/70 (31 Jan 2020 13:56) (104/70 - 125/75)  BP(mean): --  RR: 18 (31 Jan 2020 13:56) (18 - 18)  SpO2: 92% (31 Jan 2020 13:56) (92% - 93%)  CAPILLARY BLOOD GLUCOSE    I&O's Summary    30 Jan 2020 07:01  -  31 Jan 2020 07:00  --------------------------------------------------------  IN: 0 mL / OUT: 120 mL / NET: -120 mL      PHYSICAL EXAM:  GENERAL: NAD  EYES:  conjunctiva and sclera clear  NECK: Supple, No JVD  CHEST/LUNG: Clear, no ronchi   HEART: +S1/S2, reg   ABDOMEN: Soft, Nontender, Nondistended; Bowel sounds present  EXTREMITIES:  No LE edema   PSYCH: AAOx3      LABS:                        8.9    5.13  )-----------( 44       ( 31 Jan 2020 07:13 )             28.1     01-29    135  |  100  |  21  ----------------------------<  120<H>  4.7   |  23  |  1.18    Ca    8.9      29 Jan 2020 23:40  Phos  3.5     01-29  Mg     1.7     01-29    TPro  5.7<L>  /  Alb  3.5  /  TBili  0.3  /  DBili  x   /  AST  21  /  ALT  9<L>  /  AlkPhos  59  01-29    PT/INR - ( 30 Jan 2020 00:03 )   PT: 16.0 sec;   INR: 1.39 ratio         PTT - ( 30 Jan 2020 00:03 )  PTT:25.8 sec

## 2020-02-01 ENCOUNTER — TRANSCRIPTION ENCOUNTER (OUTPATIENT)
Age: 60
End: 2020-02-01

## 2020-02-01 VITALS
HEART RATE: 95 BPM | DIASTOLIC BLOOD PRESSURE: 53 MMHG | OXYGEN SATURATION: 92 % | RESPIRATION RATE: 18 BRPM | SYSTOLIC BLOOD PRESSURE: 108 MMHG | TEMPERATURE: 98 F

## 2020-02-01 LAB
BASOPHILS # BLD AUTO: 0.02 K/UL — SIGNIFICANT CHANGE UP (ref 0–0.2)
BASOPHILS NFR BLD AUTO: 0.3 % — SIGNIFICANT CHANGE UP (ref 0–2)
EOSINOPHIL # BLD AUTO: 0.1 K/UL — SIGNIFICANT CHANGE UP (ref 0–0.5)
EOSINOPHIL NFR BLD AUTO: 1.5 % — SIGNIFICANT CHANGE UP (ref 0–6)
HCT VFR BLD CALC: 28.1 % — LOW (ref 39–50)
HGB BLD-MCNC: 8.9 G/DL — LOW (ref 13–17)
IMM GRANULOCYTES NFR BLD AUTO: 3.1 % — HIGH (ref 0–1.5)
LYMPHOCYTES # BLD AUTO: 2.44 K/UL — SIGNIFICANT CHANGE UP (ref 1–3.3)
LYMPHOCYTES # BLD AUTO: 35.8 % — SIGNIFICANT CHANGE UP (ref 13–44)
MCHC RBC-ENTMCNC: 31.7 GM/DL — LOW (ref 32–36)
MCHC RBC-ENTMCNC: 34.1 PG — HIGH (ref 27–34)
MCV RBC AUTO: 107.7 FL — HIGH (ref 80–100)
MONOCYTES # BLD AUTO: 0.78 K/UL — SIGNIFICANT CHANGE UP (ref 0–0.9)
MONOCYTES NFR BLD AUTO: 11.5 % — SIGNIFICANT CHANGE UP (ref 2–14)
NEUTROPHILS # BLD AUTO: 3.26 K/UL — SIGNIFICANT CHANGE UP (ref 1.8–7.4)
NEUTROPHILS NFR BLD AUTO: 47.8 % — SIGNIFICANT CHANGE UP (ref 43–77)
NRBC # BLD: 0 /100 WBCS — SIGNIFICANT CHANGE UP (ref 0–0)
PLATELET # BLD AUTO: 38 K/UL — LOW (ref 150–400)
RBC # BLD: 2.61 M/UL — LOW (ref 4.2–5.8)
RBC # FLD: 15.4 % — HIGH (ref 10.3–14.5)
WBC # BLD: 6.81 K/UL — SIGNIFICANT CHANGE UP (ref 3.8–10.5)
WBC # FLD AUTO: 6.81 K/UL — SIGNIFICANT CHANGE UP (ref 3.8–10.5)

## 2020-02-01 PROCEDURE — 85027 COMPLETE CBC AUTOMATED: CPT

## 2020-02-01 PROCEDURE — 93005 ELECTROCARDIOGRAM TRACING: CPT

## 2020-02-01 PROCEDURE — 84100 ASSAY OF PHOSPHORUS: CPT

## 2020-02-01 PROCEDURE — 99232 SBSQ HOSP IP/OBS MODERATE 35: CPT

## 2020-02-01 PROCEDURE — 99239 HOSP IP/OBS DSCHRG MGMT >30: CPT

## 2020-02-01 PROCEDURE — 82607 VITAMIN B-12: CPT

## 2020-02-01 PROCEDURE — 80197 ASSAY OF TACROLIMUS: CPT

## 2020-02-01 PROCEDURE — 82746 ASSAY OF FOLIC ACID SERUM: CPT

## 2020-02-01 PROCEDURE — 83735 ASSAY OF MAGNESIUM: CPT

## 2020-02-01 PROCEDURE — 85610 PROTHROMBIN TIME: CPT

## 2020-02-01 PROCEDURE — 80053 COMPREHEN METABOLIC PANEL: CPT

## 2020-02-01 PROCEDURE — 85730 THROMBOPLASTIN TIME PARTIAL: CPT

## 2020-02-01 RX ORDER — PREDNISOLONE 5 MG
2 TABLET ORAL
Qty: 0 | Refills: 0 | DISCHARGE

## 2020-02-01 RX ORDER — VALGANCICLOVIR 450 MG/1
1 TABLET, FILM COATED ORAL
Qty: 0 | Refills: 0 | DISCHARGE
Start: 2020-02-01

## 2020-02-01 RX ADMIN — FINASTERIDE 5 MILLIGRAM(S): 5 TABLET, FILM COATED ORAL at 12:02

## 2020-02-01 RX ADMIN — ATOVAQUONE 750 MILLIGRAM(S): 750 SUSPENSION ORAL at 06:11

## 2020-02-01 RX ADMIN — VORICONAZOLE 200 MILLIGRAM(S): 10 INJECTION, POWDER, LYOPHILIZED, FOR SOLUTION INTRAVENOUS at 06:11

## 2020-02-01 RX ADMIN — VALGANCICLOVIR 450 MILLIGRAM(S): 450 TABLET, FILM COATED ORAL at 12:02

## 2020-02-01 RX ADMIN — Medication 1 MILLIGRAM(S): at 12:03

## 2020-02-01 RX ADMIN — TACROLIMUS 0.5 MILLIGRAM(S): 5 CAPSULE ORAL at 12:02

## 2020-02-01 RX ADMIN — PANTOPRAZOLE SODIUM 40 MILLIGRAM(S): 20 TABLET, DELAYED RELEASE ORAL at 06:11

## 2020-02-01 RX ADMIN — URSODIOL 300 MILLIGRAM(S): 250 TABLET, FILM COATED ORAL at 06:11

## 2020-02-01 RX ADMIN — SERTRALINE 50 MILLIGRAM(S): 25 TABLET, FILM COATED ORAL at 12:02

## 2020-02-01 RX ADMIN — Medication 1 TABLET(S): at 12:03

## 2020-02-01 RX ADMIN — Medication 10 MILLIGRAM(S): at 06:11

## 2020-02-01 RX ADMIN — Medication 12.5 MILLIGRAM(S): at 06:11

## 2020-02-01 NOTE — PROGRESS NOTE ADULT - PROBLEM SELECTOR PLAN 3
HFrEF as per cardiology note   - s/p lasix  -Not volume overloaded on exam   -Patient had PVCs at [facility], continue with metoprolol 12.5 mg daily   -appreciate cards recs
HFrEF as per cardiology note   - s/p lasix  -Not volume overloaded on exam   -Patient had PVCs at [facility], continue with metoprolol 12.5 mg daily   -appreciate cards recs - outpt f/up
HFrEF as per cardiology note   - s/p lasix  -Not volume overloaded on exam   -Patient had PVCs at [facility], continue with metoprolol 12.5 mg daily   -appreciate cards recs

## 2020-02-01 NOTE — DISCHARGE NOTE NURSING/CASE MANAGEMENT/SOCIAL WORK - PATIENT PORTAL LINK FT
You can access the FollowMyHealth Patient Portal offered by [facility] by registering at the following website: [email or web address]. By joining [employer]’s FollowMyHealth portal, you will also be able to view your health information using other applications (apps) compatible with our system.

## 2020-02-01 NOTE — PROGRESS NOTE ADULT - ATTENDING COMMENTS
pt wants to go home today and f/up with hematologist on Tuesday.  Cleared by hematologist  plan of care discussed with medicine NP

## 2020-02-01 NOTE — PROGRESS NOTE ADULT - SUBJECTIVE AND OBJECTIVE BOX
Patient is a 59y old  Male who presents with a chief complaint of Cough, fever (01 Feb 2020 09:04)      INTERVAL History of Present Illness/OVERNIGHT EVENTS: wants to go home - feels better    MEDICATIONS  (STANDING):  ALPRAZolam 0.5 milliGRAM(s) Oral at bedtime  atovaquone Suspension 750 milliGRAM(s) Oral two times a day  finasteride 5 milliGRAM(s) Oral daily  folic acid 1 milliGRAM(s) Oral daily  levoFLOXacin IVPB 750 milliGRAM(s) IV Intermittent every 24 hours  metoprolol succinate ER 12.5 milliGRAM(s) Oral daily  multivitamin 1 Tablet(s) Oral daily  pantoprazole    Tablet 40 milliGRAM(s) Oral before breakfast  predniSONE   Tablet 10 milliGRAM(s) Oral daily  sertraline 50 milliGRAM(s) Oral daily  tacrolimus 0.5 milliGRAM(s) Oral daily  tamsulosin 0.4 milliGRAM(s) Oral at bedtime  ursodiol Capsule 300 milliGRAM(s) Oral two times a day  valGANciclovir 450 milliGRAM(s) Oral daily  voriconazole 200 milliGRAM(s) Oral every 12 hours    MEDICATIONS  (PRN):      Allergies    No Known Allergies    Intolerances        REVIEW OF SYSTEMS:  Negative unless otherwise specified above.    Vital Signs Last 24 Hrs  T(C): 36.8 (01 Feb 2020 07:19), Max: 36.8 (01 Feb 2020 07:19)  T(F): 98.3 (01 Feb 2020 07:19), Max: 98.3 (01 Feb 2020 07:19)  HR: 88 (01 Feb 2020 07:19) (80 - 94)  BP: 114/70 (01 Feb 2020 07:19) (104/70 - 141/85)  BP(mean): --  RR: 18 (01 Feb 2020 07:19) (18 - 18)  SpO2: 96% (01 Feb 2020 07:19) (92% - 96%)        PHYSICAL EXAM:  GENERAL: No apparent distress, appears stated age  HEAD:  Atraumatic, Normocephalic  EYES: Conjunctiva and sclera clear, no discharge  ENMT: Moist mucous membranes, no nasal discharge  NECK: Supple, no JVD  CHEST/LUNG: Clear to auscultation bilaterally, no wheeze or rales  HEART: Regular rhythm, no rubs or gallops  ABDOMEN: Soft, Nontender, Nondistended; Bowel sounds present  EXTREMITIES:  No clubbing, cyanosis or edema  SKIN: No rash or new discoloration  NERVOUS SYSTEM:  Alert & Oriented; Bilateral Lower extremity mobile, sensation to light touch intact      LABS:                        8.9    6.81  )-----------( 38       ( 01 Feb 2020 05:07 )             28.1               CAPILLARY BLOOD GLUCOSE          RADIOLOGY & ADDITIONAL TESTS:    Images reviewed personally    Consultant Notes Reviewed and Care Discussed with relevant Consultants/Other Providers.

## 2020-02-01 NOTE — PROGRESS NOTE ADULT - PROBLEM SELECTOR PROBLEM 4
Pulmonary embolism, unspecified chronicity, unspecified pulmonary embolism type, unspecified whether acute cor pulmonale present

## 2020-02-01 NOTE — DISCHARGE NOTE PROVIDER - NSDCFUSCHEDAPPT_GEN_ALL_CORE_FT
DI CONTRERAS ; 02/05/2020 ; [facility]  DI CONTRERAS ; 02/14/2020 ; [facility]  DI CONTRERAS ; 02/21/2020 ; [facility]  DI CONTRERAS ; 02/26/2020 ; [facility] DI CONTRERAS ; 02/05/2020 ; [facility]  DI CONTRERAS ; 02/14/2020 ; [facility]  DI CONTRERAS ; 02/21/2020 ; [facility]  DI CONTRERAS ; 02/26/2020 ; [facility]

## 2020-02-01 NOTE — PROGRESS NOTE ADULT - PROBLEM SELECTOR PLAN 5
Off AC due to thrombocytopenia
Off AC due to thrombocytopenia
Off AC due to thrombocytopenia.  further mgmt per heme

## 2020-02-01 NOTE — PROGRESS NOTE ADULT - PROBLEM SELECTOR PLAN 9
1.  Name of PCP: Dr. Gaytan  2.  PCP Contacted on Admission: [x ] Y    [ ] N    3.  PCP contacted at Discharge: [ ] Y    [ ] N    [ ] N/A  4.  Post-Discharge Appointment Date and Location:  5.  Summary of Handoff given to PCP:

## 2020-02-01 NOTE — PROGRESS NOTE ADULT - ASSESSMENT
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
Mr. Delong is a 59 year old male, with previously diagnosed acute myeloid leukemia. s/p induction chemotherapy with Daunorubicin/Cytarabine and  HIDAC consolidation chemotherapy, s/p  Haplo-identical stem cell transplant from son. Other history includes non-ischemic cardiomyopathy (recent EF 25% 9/19/19), COLLEEN, DVT/PE, HTN, Factor V Leiden and amputation of right toe due to osteomyelitis.   He has been admitted for pneumonia.    - Known LV dysfunction. Cardiac MRI in 9/2029 with an EF on 24 %, with mid wall myocardial late gadolinium enhancement at the base involving   the anteroseptal and inferoseptal wall  associated with a nonischemic cardiomyopathy.  - No evidence of volume overload at present. Continue to maintain strict I's and O's, and to monitor for signs of volume overload, which he is at risk for.     - EKG continues to demonstrate a SR, with RBBB, LAD and non-specific ST-T abn  - No evidence of acute ischemia  - BP is overall stable  - Continue BB at current doses as tolerated by BP.    - Hx of dvt/pe but can not be AC at this time 2/2 low platelets  - Monitor and replete lytes  - To follow while admitted.  - Other cardiovascular workup will depend on clinical course.  - All other workup per primary team.
Mr. Delong is a 59 year old male, with previously diagnosed acute myeloid leukemia. s/p induction chemotherapy with Daunorubicin/Cytarabine and  HIDAC consolidation chemotherapy, s/p  Haplo-identical stem cell transplant from son. Other history includes non-ischemic cardiomyopathy (recent EF 25% 9/19/19), COLLEEN, DVT/PE, HTN, Factor V Leiden and amputation of right toe due to osteomyelitis.   He has been admitted for pneumonia.    - Known LV dysfunction. Cardiac MRI in 9/2029 with an EF on 24 %, with mid wall myocardial late gadolinium enhancement at the base involving the anteroseptal and inferoseptal wall  associated with a nonischemic cardiomyopathy.  - No evidence of volume overload at present.  - EKG continues to demonstrate a SR, with RBBB, LAD and non-specific ST-T abn  - No evidence of acute ischemia  - BP is overall stable  - Continue BB at current doses as tolerated by BP.    - Hx of dvt/pe but can not be AC at this time 2/2 low platelets  - Monitor and replete lytes  - Other cardiovascular workup will depend on clinical course.  - All other workup per primary team.  - d/c planning. He will follow up with Dr. Lyn in our office in 1-2 months.
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

## 2020-02-01 NOTE — PROGRESS NOTE ADULT - PROBLEM SELECTOR PLAN 1
-fever, cough and hypoxia with CXR showing multifocal PNA in L lobe   -s/p vancomycin and zosyn x 3 days, changed to levaquin   - continue current therapy   - titrated off oxygen - sats 93% room air  - RVP negative
fever, cough and hypoxia with CXR showing multifocal PNA in L lobe   -s/p vancomycin and zosyn x 3 day  - continue current therapy   - titrate off oxygen as tolerated   - RVP negative
-fever, cough and hypoxia with CXR showing multifocal PNA in L lobe   -s/p vancomycin and zosyn x 3 days, changed to levaquin today   - continue current therapy   - titrate off oxygen as tolerated, now down to 2 L   - RVP negative

## 2020-02-01 NOTE — PROGRESS NOTE ADULT - PROBLEM SELECTOR PLAN 6
Most likely due to acute illness vs malignancy   -Trend CBC, transfuse if plts <10, <15 and fever, or <50 and bleeding
Most likely due to acute illness vs malignancy   -Trend CBC, transfuse if plts <10, <15 and fever, or <50 and bleeding
Most likely due to acute illness vs malignancy   -outpt heme f/up

## 2020-02-01 NOTE — PROGRESS NOTE ADULT - PROBLEM SELECTOR PROBLEM 5
Factor 5 Leiden mutation, heterozygous

## 2020-02-01 NOTE — DISCHARGE NOTE PROVIDER - HOSPITAL COURSE
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 S/P vancomycin and zosyn x 3 days, then changed to IV levaquin    Cleared for discharge as per Dr. Viveros on PO Levaquin x 2 more days.    Seen and cleared by heme for outpatient follow up.

## 2020-02-01 NOTE — PROGRESS NOTE ADULT - PROBLEM SELECTOR PLAN 7
Hgb stable, no acute signs of bleeding   -transfuse if hgb <7
Hgb stable, no acute signs of bleeding
Hgb stable, no acute signs of bleeding   -transfuse if hgb <7

## 2020-02-01 NOTE — PROGRESS NOTE ADULT - PROBLEM SELECTOR PLAN 2
s/p bone marrow transplant on 10/9/19  -C/w tacro 0.5 daily.  -appreciate heme onc recs  -continue home mepron, vori, and valganciclovir   - CMV PCR negative  -continue home prednisone  -cleared by primary hematologist for discharge
s/p bone marrow transplant on 10/9/19  -C/w tacro 0.5 daily. repeat tacro level in AM   -appreciate heme onc recs  -continue home mepron, vori, and valganciclovir   - CMV PCR negative  -continue home prednisone
s/p bone marrow transplant on 10/9/19  -C/w tacro 0.5 daily.  -appreciate heme onc recs  -continue home mepron, vori, and valganciclovir   - CMV PCR negative  -continue home prednisone

## 2020-02-01 NOTE — PROGRESS NOTE ADULT - SUBJECTIVE AND OBJECTIVE BOX
[facility] - Ede Zamudio, Riki, Edgar, Gem, Tabitha Garcia  Office Number:  [phone]    Patient resting comfortably in bed in NAD.  Laying flat with no respiratory distress.  No complaints of chest pain, dyspnea, palpitations, PND, or orthopnea.  breathing much improved    ROS: negative unless otherwise mentioned.    Telemetry:  off    MEDICATIONS  (STANDING):  ALPRAZolam 0.5 milliGRAM(s) Oral at bedtime  atovaquone Suspension 750 milliGRAM(s) Oral two times a day  finasteride 5 milliGRAM(s) Oral daily  folic acid 1 milliGRAM(s) Oral daily  levoFLOXacin IVPB 750 milliGRAM(s) IV Intermittent every 24 hours  metoprolol succinate ER 12.5 milliGRAM(s) Oral daily  multivitamin 1 Tablet(s) Oral daily  pantoprazole    Tablet 40 milliGRAM(s) Oral before breakfast  predniSONE   Tablet 10 milliGRAM(s) Oral daily  sertraline 50 milliGRAM(s) Oral daily  tacrolimus 0.5 milliGRAM(s) Oral daily  tamsulosin 0.4 milliGRAM(s) Oral at bedtime  ursodiol Capsule 300 milliGRAM(s) Oral two times a day  valGANciclovir 450 milliGRAM(s) Oral daily  voriconazole 200 milliGRAM(s) Oral every 12 hours    MEDICATIONS  (PRN):      Allergies    No Known Allergies    Intolerances        Vital Signs Last 24 Hrs  T(C): 36.8 (01 Feb 2020 07:19), Max: 36.8 (31 Jan 2020 11:34)  T(F): 98.3 (01 Feb 2020 07:19), Max: 98.3 (31 Jan 2020 11:34)  HR: 88 (01 Feb 2020 07:19) (80 - 94)  BP: 114/70 (01 Feb 2020 07:19) (104/70 - 141/85)  BP(mean): --  RR: 18 (01 Feb 2020 07:19) (18 - 18)  SpO2: 96% (01 Feb 2020 07:19) (92% - 96%)    I&O's Summary    31 Jan 2020 07:01  -  01 Feb 2020 07:00  --------------------------------------------------------  IN: 450 mL / OUT: 600 mL / NET: -150 mL        ON EXAM:    General: NAD, awake and alert, oriented x 3  HEENT: Mucous membranes are moist, anicteric  Lungs: Non-labored, breath sounds are clear bilaterally, No wheezing, rales or rhonchi  Cardiovascular: Regular, S1 and S2, no murmurs, rubs, or gallops  Gastrointestinal: Bowel Sounds present, soft, nontender.   Lymph: No peripheral edema. No lymphadenopathy.  Skin: No rashes or ulcers  Psych:  Mood & affect appropriate    LABS: All Labs Reviewed:                        8.9    6.81  )-----------( 38       ( 01 Feb 2020 05:07 )             28.1                         8.9    5.13  )-----------( 44       ( 31 Jan 2020 07:13 )             28.1                         9.6    6.37  )-----------( 40       ( 29 Jan 2020 23:40 )             30.3     29 Jan 2020 23:40    135    |  100    |  21     ----------------------------<  120    4.7     |  23     |  1.18     Ca    8.9        29 Jan 2020 23:40  Phos  3.5       29 Jan 2020 23:40  Mg     1.7       29 Jan 2020 23:40    TPro  5.7    /  Alb  3.5    /  TBili  0.3    /  DBili  x      /  AST  21     /  ALT  9      /  AlkPhos  59     29 Jan 2020 23:40          Blood Culture: Organism --  Gram Stain Blood -- Gram Stain --  Specimen Source .Urine Clean Catch (Midstream)  Culture-Blood --    Organism --  Gram Stain Blood -- Gram Stain --  Specimen Source .Blood Blood-Peripheral  Culture-Blood --

## 2020-02-01 NOTE — DISCHARGE NOTE PROVIDER - NSDCMRMEDTOKEN_GEN_ALL_CORE_FT
ALPRAZolam 0.5 mg oral tablet: 1 tab(s) orally once a day (at bedtime)  atovaquone 750 mg/5 mL oral suspension: 5 milliliter(s) orally 2 times a day  finasteride 5 mg oral tablet: 1 tab(s) orally once a day  folic acid 1 mg oral tablet: 1 tab(s) orally once a day  Levaquin 750 mg oral tablet: 1 tab(s) orally every 24 hours   metoprolol succinate 25 mg oral tablet, extended release: 12.5 milligram(s) orally once a day  Multiple Vitamins oral tablet: 1 tab(s) orally once a day  pantoprazole 40 mg oral delayed release tablet: 1 tab(s) orally once a day (before a meal)  predniSONE 10 mg oral tablet: 1 tab(s) orally once a day  sertraline 50 mg oral tablet: 1 tab(s) orally once a day  tacrolimus 0.5 mg oral capsule: 1 cap(s) orally once a day  tamsulosin 0.4 mg oral capsule: 1 cap(s) orally once a day (at bedtime)  ursodiol 300 mg oral capsule: 1 cap(s) orally 2 times a day (with meals)  valGANciclovir 450 mg oral tablet: 1 tab(s) orally once a day  voriconazole 200 mg oral tablet: 1 tab(s) orally every 12 hours

## 2020-02-01 NOTE — DISCHARGE NOTE PROVIDER - CARE PROVIDER_API CALL
Rosina Gaytan)  Medical Oncology  [address] [place] [address]  Phone: [phone]  Fax: [phone]  Follow Up Time:

## 2020-02-04 LAB
CMV DNA CSF QL NAA+PROBE: SIGNIFICANT CHANGE UP
CMV DNA SPEC NAA+PROBE-LOG#: SIGNIFICANT CHANGE UP LOG10IU/ML

## 2020-02-05 ENCOUNTER — APPOINTMENT (OUTPATIENT)
Dept: HEMATOLOGY ONCOLOGY | Facility: CLINIC | Age: 60
End: 2020-02-05
Payer: COMMERCIAL

## 2020-02-05 ENCOUNTER — RESULT REVIEW (OUTPATIENT)
Age: 60
End: 2020-02-05

## 2020-02-05 VITALS
HEART RATE: 84 BPM | TEMPERATURE: 98.1 F | SYSTOLIC BLOOD PRESSURE: 115 MMHG | BODY MASS INDEX: 24.73 KG/M2 | WEIGHT: 182.32 LBS | RESPIRATION RATE: 20 BRPM | OXYGEN SATURATION: 98 % | DIASTOLIC BLOOD PRESSURE: 81 MMHG

## 2020-02-05 LAB
BASOPHILS # BLD AUTO: 0 K/UL — SIGNIFICANT CHANGE UP (ref 0–0.2)
BASOPHILS NFR BLD AUTO: 0.5 % — SIGNIFICANT CHANGE UP (ref 0–2)
EOSINOPHIL # BLD AUTO: 0.1 K/UL — SIGNIFICANT CHANGE UP (ref 0–0.5)
EOSINOPHIL NFR BLD AUTO: 1.9 % — SIGNIFICANT CHANGE UP (ref 0–6)
HCT VFR BLD CALC: 31.4 % — LOW (ref 39–50)
HGB BLD-MCNC: 10.9 G/DL — LOW (ref 13–17)
LYMPHOCYTES # BLD AUTO: 2.2 K/UL — SIGNIFICANT CHANGE UP (ref 1–3.3)
LYMPHOCYTES # BLD AUTO: 28.5 % — SIGNIFICANT CHANGE UP (ref 13–44)
MCHC RBC-ENTMCNC: 34.7 G/DL — SIGNIFICANT CHANGE UP (ref 32–36)
MCHC RBC-ENTMCNC: 37.7 PG — HIGH (ref 27–34)
MCV RBC AUTO: 109 FL — HIGH (ref 80–100)
MONOCYTES # BLD AUTO: 0.2 K/UL — SIGNIFICANT CHANGE UP (ref 0–0.9)
MONOCYTES NFR BLD AUTO: 2.2 % — SIGNIFICANT CHANGE UP (ref 2–14)
NEUTROPHILS # BLD AUTO: 5.3 K/UL — SIGNIFICANT CHANGE UP (ref 1.8–7.4)
NEUTROPHILS NFR BLD AUTO: 66.9 % — SIGNIFICANT CHANGE UP (ref 43–77)
PLATELET # BLD AUTO: 40 K/UL — LOW (ref 150–400)
RBC # BLD: 2.89 M/UL — LOW (ref 4.2–5.8)
RBC # FLD: 15.5 % — HIGH (ref 10.3–14.5)
TACROLIMUS SERPL-MCNC: 4.1 NG/ML
WBC # BLD: 7.9 K/UL — SIGNIFICANT CHANGE UP (ref 3.8–10.5)
WBC # FLD AUTO: 7.9 K/UL — SIGNIFICANT CHANGE UP (ref 3.8–10.5)

## 2020-02-05 PROCEDURE — 99215 OFFICE O/P EST HI 40 MIN: CPT

## 2020-02-05 NOTE — ASSESSMENT
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\par 10/30/19 chimerism = 100% donor\par 11/14/19 chimerism = 100% donor\par 11/26/19 chimerism = 100% donor\par 12/4/19 chimerism: 100% donor\par 12/24/19 chimerism = 100% donor\par last visit 100% as well\par Post transplant BMBx pending\par \par 2) Heme\par Counts stable but remains pancytopenic, no indication for transfusion today.\par WBC 7.9 HGB 10.9  ANC 5.3 PLT 40\par Monitor counts closely while on Valcyte, started 11/20/19 ..decrease to QD\par Continue multivitamin and folic acid daily\par \par Hx of recurrent DVT/PE\par Factor V Leiden reportedly runs in the family \par 11/21/19 Doppler US of RUE negative for DVT\par Xarelto on hold while pancytopenic, to resume when PLT consistently >100K\par \par 3) ID\par Continue ppx:\par - Mepron 750 mg bid\par - Voriconazole 200 mg bid - hx of aspergillosis \par Post transplant crowd and dietary restrictions reviewed\par \par CMV viremia\par 11/7/19 CMV PCR = 247\par 11/14/19 CMV PCR = 2,205\par 11/20/19 CMV PCR = 8,581\par 11/26/19 CMV PCR = 4103\par 12/4/19 CMV PCR = 215\par 12/10/19 CMV PCR= not detected\par 12/24/19 CMV PCR = not detected\par Valcyte 450mg BID started 11/21/19. Decrease to QD as of 12/17/19.\par Continue to monitor PCR weekly \par \par 4) GVHD\par Skin 1 Liver 0 GI 0 - overall grade 1....6 percent skin erythema\par No signs/symptoms of  chronic GVHD at this time\par Continue  FK 0.5mg daily \par Off MMF as of 11/22/19 \par Reviewed signs/symptoms of GVHD (i.e. rash, mucositis, nausea/vomiting, diarrhea)\par \par 5) GI\par Continue ppx:\par - Protonix 40 mg daily\par - Ursodiol 300 mg bid\par PRN Zofran and Reglan for nausea/vomiting\par \par 6) Failure to thrive\par Poor PO intake/hydration with resulting hypotension\par Prednisone 20 mg daily started 11/20/19....Prednisone lowered to 5mg daily.....then Prednisone increased to 10mg QD b/c of worsening skin rash..gvhd\par Decrease IV fluid to once a week until PO intake improves, 750mL/2hrs given cardiomyopathy - cancelling IV fluid for 12/17/19, 12/24/19, and 1/2/20.\par Encouraged to hydrate adequately and eat small frequent meals/snacks\par \par 7) Other\par HTN - enalapril 2.5mg daily and metoprolol succinate 12.5mg daily on hold in setting of hypotension and FTT. Back on metoprolol daily - increase to BID as of 1/2/20\par BPH - continue finasteride 5mg daily and tamsulosin 0.4mg daily \par Hypomagnesemia - likely 2/2 FK, continue MgOx 400mg tid\par Anxiety/Depression - continue Zoloft 50mg daily and PRN Xanax 0.5mg qhs\par \par 8) Plan/Dispo\par Pt educated regarding plan of care, all questions/concerns addressed\par Peripheral blood work reviewed and discussed with patient.\par Labs sent for CMP, LDH, Mg, CD3 and CD33 Enrichment, Engraftment, Tacrolimus Whole Blood, CMV by PCR.\par Increase Metoprolol to BID as of 1/2/20.\par Continue Prednisone 10mg QD...could increase if rash worsens\par Continue FK 0.5mg QD\par Renewed Tamsulosin on 1/2/20.\par Decrease Valcyte 450mg to QD as of 12/17/19.\par Patient instructed to finish the course of Levaquin Abx as of 2/5/20\par Advised to continue to use Hydrocortisone cream and moisturizer for erythema and itchiness.\par Advised against travel at this time.\par Maintain weekly appointments at this time.\par RTC for f/u with YADIRA Arias on 2/14/20 and 2/21/20.  RTC for f/u with Dr. Gaytan on 2/26/20.

## 2020-02-05 NOTE — REVIEW OF SYSTEMS
[Fatigue] : fatigue [SOB on Exertion] : shortness of breath during exertion [Negative] : Allergic/Immunologic [de-identified] : mild erythema on chest and neck  [FreeTextEntry8] : .

## 2020-02-05 NOTE — HISTORY OF PRESENT ILLNESS
[de-identified] : Mr. Delong is a 60 y/o male, with a previously diagnosed acute myeloid leukemia. A bone marrow biopsy from 7/3 revealed Acute myeloid leukemia (AML). FISH - report shows a population of aberrant myeloid blasts. He is currently being treated by Dr. Coelho for high risk AML with HIDAC consolidation chemotherapy. He was referred by Dr. Coelho for an initial consultation of a Haplo- transplant. \par \par The patient has a history of multiple blood clots - factor V Leiden runs in the family. He was diagnosed with sleep apnea, but refuses to use CPAP. He is a former smoker, 40 years. He also has a past medical history of PE, HTN, cardiomyopathy. He recently underwent an amputation of the right first toe due to an infection.\par \par S/p hospitalization at [facility] 10/3/19 - 11/1/19 for haplo (son) SCT. \par Upon admission, a TLC was placed in IR. Mr. Delong received IV fluid hydration, pain management, nutritional support, anxiolysis, antiemetics, and antiviral,  antifungal, antibacterial, GI, PCP and VOD prophylaxis. When his ANC dropped  below 1000, he was started on prophylactic Cefepime. Labs were monitored on a daily basis and he received transfusional support and electrolyte repletion as needed. \par \par While in patient, Mr. Delong was continued on his Voriconazole for history of aspergillosis. \par \par S/p hospitalization at [facility] 10/3/19 - 11/1/19 for haplo (son) SCT. \par Upon admission, a TLC was placed in IR. Mr. Delong received IV fluid hydration, pain management, nutritional support, anxiolysis, antiemetics, and antiviral, antifungal, antibacterial, GI, PCP and VOD prophylaxis. When his ANC dropped below 1000, he was started on prophylactic Cefepime. Labs were monitored on a daily basis and he received transfusional support and electrolyte repletion as needed. \par \par While in patient, Mr. Delong was continued on his Voriconazole for history of aspergillosis. \par \par During admission, Mr. Delong had pancytopenia related to the high dose chemotherapy prep regimen. He also experienced neutropenic fevers. When he became febrile, blood and urine cultures were sent and a CXR was completed which were unremarkable. \par \par On 10/9/2019, following premedications, pt received 250 ml of allogeneic, related, haplo, fresh, mobilized HPC apheresis over 30-60 minutes. \par Cell counts as follows: \par \par Total MNCs (x10^8/kg) = 5.17 \par CD 34 cells (x10^6/kg) = 7.34 \par CD 3 Cells (x 10^7/kg) = 19.81 \par Cell viability (%) = 100 \par \par Pt tolerated infusion without any adverse reaction or complications. \par \par Engraftment was noted on 10/28/2019. Daily Zarxio was discontinued, as was Cefepime given negative cultures. Mr. Delong was discharged home to f/u at the [facility].  [de-identified] : Patient presents today for a follow-up visit s/p Haplo- transplant (son) on 10/9/19. He is doing well overall but he does note mild erythema on his chest and neck as well as itchiness on his face. Advised patient to use hydrocortisone cream and moisturizer....He is also instructed to finish the course of Levaquin abx...Currently taking FK 0.5 q daily and Prednisone 5mg QD. Remains complaint with all other medications.... Denies fever/chills, night sweats, mouth sores, eye dryness, blurred vision, nausea, vomiting, diarrhea. No CP, SOB or LE edema. Was hospitalized with pneumonia last week..completing course of antibiotics..\par \par

## 2020-02-05 NOTE — PHYSICAL EXAM
[Ambulatory and capable of all self care but unable to carry out any work activities] : Status 2- Ambulatory and capable of all self care but unable to carry out any work activities. Up and about more than 50% of waking hours [Normal] : grossly intact [de-identified] : s/p right first toe amputation.

## 2020-02-05 NOTE — ADDENDUM
[FreeTextEntry1] : Documented by J Carlos Babb acting as a scribe for Dr. Rosina Gaytan on 02/05/2020 \par All medical record entries made by the Scribe were at my, Dr. Rosina Gaytan, direction and personally dictated by me on 02/05/2020. I have reviewed the chart and agree that the record accurately reflects my personal performance of the history, physical exam, assessment and plan. I have also personally directed, reviewed, and agree with the discharge instructions.\par \par \par

## 2020-02-06 LAB
ALBUMIN SERPL ELPH-MCNC: 3.9 G/DL
ALP BLD-CCNC: 62 U/L
ALT SERPL-CCNC: 8 U/L
ANION GAP SERPL CALC-SCNC: 17 MMOL/L
AST SERPL-CCNC: 20 U/L
BILIRUB SERPL-MCNC: <0.2 MG/DL
BUN SERPL-MCNC: 17 MG/DL
CALCIUM SERPL-MCNC: 9.4 MG/DL
CHLORIDE SERPL-SCNC: 104 MMOL/L
CMV DNA SPEC QL NAA+PROBE: NOT DETECTED
CMVPCR LOG: NOT DETECTED LOG10IU/ML
CO2 SERPL-SCNC: 20 MMOL/L
CREAT SERPL-MCNC: 0.97 MG/DL
GLUCOSE SERPL-MCNC: 123 MG/DL
LDH SERPL-CCNC: 341 U/L
MAGNESIUM SERPL-MCNC: 1.8 MG/DL
POTASSIUM SERPL-SCNC: 4.7 MMOL/L
PROT SERPL-MCNC: 5.7 G/DL
SODIUM SERPL-SCNC: 141 MMOL/L

## 2020-02-13 LAB
CD3 AND CD33 ENRICHMENT: NORMAL
ENGRAFTMNET-POST: NORMAL

## 2020-02-14 ENCOUNTER — RESULT REVIEW (OUTPATIENT)
Age: 60
End: 2020-02-14

## 2020-02-14 ENCOUNTER — APPOINTMENT (OUTPATIENT)
Dept: HEMATOLOGY ONCOLOGY | Facility: CLINIC | Age: 60
End: 2020-02-14
Payer: COMMERCIAL

## 2020-02-14 VITALS
HEART RATE: 88 BPM | OXYGEN SATURATION: 95 % | TEMPERATURE: 97.9 F | BODY MASS INDEX: 26.31 KG/M2 | RESPIRATION RATE: 18 BRPM | WEIGHT: 193.98 LBS | DIASTOLIC BLOOD PRESSURE: 74 MMHG | SYSTOLIC BLOOD PRESSURE: 107 MMHG

## 2020-02-14 LAB
BASOPHILS # BLD AUTO: 0.1 K/UL — SIGNIFICANT CHANGE UP (ref 0–0.2)
BASOPHILS NFR BLD AUTO: 0.9 % — SIGNIFICANT CHANGE UP (ref 0–2)
EOSINOPHIL # BLD AUTO: 0.2 K/UL — SIGNIFICANT CHANGE UP (ref 0–0.5)
EOSINOPHIL NFR BLD AUTO: 2.9 % — SIGNIFICANT CHANGE UP (ref 0–6)
HCT VFR BLD CALC: 30 % — LOW (ref 39–50)
HGB BLD-MCNC: 10.2 G/DL — LOW (ref 13–17)
LYMPHOCYTES # BLD AUTO: 3.4 K/UL — HIGH (ref 1–3.3)
LYMPHOCYTES # BLD AUTO: 48.9 % — HIGH (ref 13–44)
MCHC RBC-ENTMCNC: 33.9 G/DL — SIGNIFICANT CHANGE UP (ref 32–36)
MCHC RBC-ENTMCNC: 37.1 PG — HIGH (ref 27–34)
MCV RBC AUTO: 110 FL — HIGH (ref 80–100)
MONOCYTES # BLD AUTO: 0.2 K/UL — SIGNIFICANT CHANGE UP (ref 0–0.9)
MONOCYTES NFR BLD AUTO: 3.1 % — SIGNIFICANT CHANGE UP (ref 2–14)
NEUTROPHILS # BLD AUTO: 3.1 K/UL — SIGNIFICANT CHANGE UP (ref 1.8–7.4)
NEUTROPHILS NFR BLD AUTO: 44.3 % — SIGNIFICANT CHANGE UP (ref 43–77)
PLATELET # BLD AUTO: 26 K/UL — LOW (ref 150–400)
RBC # BLD: 2.74 M/UL — LOW (ref 4.2–5.8)
RBC # FLD: 15.3 % — HIGH (ref 10.3–14.5)
WBC # BLD: 6.9 K/UL — SIGNIFICANT CHANGE UP (ref 3.8–10.5)
WBC # FLD AUTO: 6.9 K/UL — SIGNIFICANT CHANGE UP (ref 3.8–10.5)

## 2020-02-14 PROCEDURE — 99214 OFFICE O/P EST MOD 30 MIN: CPT

## 2020-02-14 RX ORDER — AZITHROMYCIN 250 MG/1
250 TABLET, FILM COATED ORAL
Qty: 6 | Refills: 0 | Status: DISCONTINUED | COMMUNITY
Start: 2020-01-27 | End: 2020-02-14

## 2020-02-14 RX ORDER — OSELTAMIVIR PHOSPHATE 75 MG/1
75 CAPSULE ORAL DAILY
Qty: 1 | Refills: 0 | Status: DISCONTINUED | COMMUNITY
Start: 2020-01-27 | End: 2020-02-14

## 2020-02-15 NOTE — PHYSICAL EXAM
[Ambulatory and capable of all self care but unable to carry out any work activities] : Status 2- Ambulatory and capable of all self care but unable to carry out any work activities. Up and about more than 50% of waking hours [Normal] : affect appropriate [de-identified] : no edema [de-identified] : +BS; abdomen soft, non-distended, non-tender [de-identified] : s/p right first toe amputation.  [de-identified] : maculopapular erythematous rash of neck, chest, and bilateral upper arms

## 2020-02-15 NOTE — ASSESSMENT
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\par 12/4/19 chimerism: 100% donor\par 12/24/19 chimerism = 100% donor (CD33 = 100% and CD3 = 99.3% donor)\par 1/8/20 chimerism = 100% donor\par 2/5/20 chimerism = 100% donor\par Post transplant BMBx pending approx day +120-150\par \par 2) Heme\par ABO compatible transplant: pt and donor are both A pos\par Ongoing anemia and thrombocytopenia poss 2/2 Valcyte, d/c'd as of 2/14/20\par No indication for transfusion today\par    2/14/20:   WBC 6.9   ANC 3.1   Hgb 10.2   PLT 26\par Scheduled for poss PLT on 2/21 after provider visit \par Continue multivitamin and folic acid daily\par \par Hx of recurrent DVT/PE\par Factor V Leiden reportedly runs in the family \par 11/21/19 Doppler US of RUE negative for DVT\par Xarelto on hold while pancytopenic, to resume when PLT consistently >100K\par \par 3) ID\par Continue ppx:\par - Mepron 750mg bid\par - Voriconazole 200mg bid - hx of aspergillosis \par Post transplant crowd and dietary restrictions reviewed\par \par CMV viremia\par 11/7/19 CMV PCR = 247\par 11/14/19 CMV PCR = 2,205\par 11/20/19 CMV PCR = 8,581\par 11/26/19 CMV PCR = 4103\par 12/4/19 CMV PCR = 215\par CMV PCR not detected as of 12/10/19\par Valcyte 450mg BID started 11/21/19. Decreased to QD as of 12/17/19. Held as of 2/14/20 for ongoing thrombocytopenia \par Continue to monitor PCR weekly \par \par 4) GVHD\par Skin 1   Liver 0   GI 0 - overall grade 1\par No signs of chronic GVHD at this time\par Off MMF as of 11/22/19 \par Continue FK 0.5mg daily - pending today's level\par Prednisone increased to 20mg daily as of 2/14/20 for skin GVHD as below\par Reviewed signs/symptoms of GVHD (i.e. rash, mucositis, nausea/vomiting, diarrhea)\par \par aGVHD of skin \par Maculopapular pruritic rash of face, neck, and upper chest (BSA 19%) noted 1/21/20 in setting of tapering prednisone (for FTT) - max skin stage 1, overall grade 1\par 1/21/20: rash on face, neck, and chest (BSA 19%); skin 1, overall grade 1; prednisone increased to 10mg daily\par 2/14/20: rash on neck, chest, and bilateral upper arms (BSA 18%); skin 1, overall grade 1: prednisone increased to 20mg daily\par Continue triamcinolone 0.1% cream bid\par Continue to monitor closely \par \par 5) GI\par Continue ppx:\par - Protonix 40 mg daily\par - Ursodiol 300 mg bid\par PRN Zofran and Reglan for nausea/vomiting\par \par 6) Failure to thrive\par Poor PO intake/hydration with resulting hypotension\par Prednisone 20 mg daily started 11/20/19 with resolution of symptoms\par RESOLVED\par \par 7) Other\par HTN - continue metoprolol succinate 12.5mg bid. Continue to hold enalapril 2.5mg daily in light of recent hypotension\par BPH - continue finasteride 5mg daily and tamsulosin 0.4mg daily \par Hypomagnesemia - likely 2/2 FK, continue MgOx 400mg tid\par Anxiety/Depression - continue Zoloft 50mg daily and PRN Xanax 0.5mg qhs\par \par 8) Plan/Dispo\par Pt educated regarding plan of care, all questions/concerns addressed\par Continue weekly appts at this time\par F/u with NP on 2/21 and poss PLT transfusion

## 2020-02-15 NOTE — REVIEW OF SYSTEMS
[Fever] : no fever [Fatigue] : fatigue [Eye Pain] : no eye pain [Dry Eyes] : no dryness of the eyes [Vision Problems] : no vision problems [Dysphagia] : no dysphagia [Nosebleeds] : no nosebleeds [Odynophagia] : no odynophagia [Mucosal Pain] : no mucosal pain [Chest Pain] : no chest pain [Lower Ext Edema] : no lower extremity edema [Cough] : cough [Abdominal Pain] : no abdominal pain [Vomiting] : no vomiting [Constipation] : no constipation [Diarrhea] : no diarrhea [Dysuria] : no dysuria [Joint Pain] : no joint pain [Muscle Pain] : no muscle pain [Skin Rash] : skin rash [Dizziness] : no dizziness [Fainting] : no fainting [Insomnia] : insomnia [Negative] : Heme/Lymph [FreeTextEntry8] : . [de-identified] : pruritic rash of neck, upper chest, and bilateral upper arms

## 2020-02-15 NOTE — HISTORY OF PRESENT ILLNESS
[de-identified] : Mr. Delong is a 60 y/o male, with a previously diagnosed acute myeloid leukemia. A bone marrow biopsy from 7/3 revealed Acute myeloid leukemia (AML). FISH - report shows a population of aberrant myeloid blasts. He is currently being treated by Dr. Coelho for high risk AML with HIDAC consolidation chemotherapy. He was referred by Dr. Coelho for an initial consultation of a Haplo- transplant. \par \par The patient has a history of multiple blood clots - factor V Leiden runs in the family. He was diagnosed with sleep apnea, but refuses to use CPAP. He is a former smoker, 40 years. He also has a past medical history of PE, HTN, cardiomyopathy. He recently underwent an amputation of the right first toe due to an infection.\par \par S/p hospitalization at [facility] 10/3/19 - 11/1/19 for haplo (son) SCT. \par Upon admission, a TLC was placed in IR. Mr. Delong received IV fluid hydration, pain management, nutritional support, anxiolysis, antiemetics, and antiviral,  antifungal, antibacterial, GI, PCP and VOD prophylaxis. When his ANC dropped  below 1000, he was started on prophylactic Cefepime. Labs were monitored on a daily basis and he received transfusional support and electrolyte repletion as needed. \par \par While in patient, Mr. Delong was continued on his Voriconazole for history of aspergillosis. \par \par S/p hospitalization at [facility] 10/3/19 - 11/1/19 for haplo (son) SCT. \par Upon admission, a TLC was placed in IR. Mr. Delong received IV fluid hydration, pain management, nutritional support, anxiolysis, antiemetics, and antiviral, antifungal, antibacterial, GI, PCP and VOD prophylaxis. When his ANC dropped below 1000, he was started on prophylactic Cefepime. Labs were monitored on a daily basis and he received transfusional support and electrolyte repletion as needed. \par \par While in patient, Mr. Delong was continued on his Voriconazole for history of aspergillosis. \par \par During admission, Mr. Delong had pancytopenia related to the high dose chemotherapy prep regimen. He also experienced neutropenic fevers. When he became febrile, blood and urine cultures were sent and a CXR was completed which were unremarkable. \par \par On 10/9/2019, following premedications, pt received 250 ml of allogeneic, related, haplo, fresh, mobilized HPC apheresis over 30-60 minutes. \par Cell counts as follows: \par \par Total MNCs (x10^8/kg) = 5.17 \par CD 34 cells (x10^6/kg) = 7.34 \par CD 3 Cells (x 10^7/kg) = 19.81 \par Cell viability (%) = 100 \par \par Pt tolerated infusion without any adverse reaction or complications. \par \par Engraftment was noted on 10/28/2019. Daily Zarxio was discontinued, as was Cefepime given negative cultures. Mr. Delong was discharged home to f/u at the [facility].  [de-identified] : On 2/14/20 visit, day +128 of SCT today. Overall feeling well with good PO intake. Pruritic rash now involving neck, chest, and bilateral upper arms despite hydrocortisone cream. Notes occasional dry cough. Compliant with post transplant meds and restrictions. Currently taking FK 0.5mg daily which he did not take prior to lab draw, and prednisone 10mg daily. Denies fever, chills, headache, dizziness, blurred vision, mucositis/odynophagia, chest pain, SOB, nausea/vomiting, diarrhea/constipation, abdominal pain, dysuria, LE edema, bleeding, pain\par

## 2020-02-17 LAB
ALBUMIN SERPL ELPH-MCNC: 4.1 G/DL
ALP BLD-CCNC: 71 U/L
ALT SERPL-CCNC: 11 U/L
ANION GAP SERPL CALC-SCNC: 10 MMOL/L
AST SERPL-CCNC: 13 U/L
BILIRUB SERPL-MCNC: 0.2 MG/DL
BUN SERPL-MCNC: 19 MG/DL
CALCIUM SERPL-MCNC: 9.2 MG/DL
CHLORIDE SERPL-SCNC: 103 MMOL/L
CO2 SERPL-SCNC: 26 MMOL/L
CREAT SERPL-MCNC: 1.01 MG/DL
GLUCOSE SERPL-MCNC: 124 MG/DL
LDH SERPL-CCNC: 228 U/L
MAGNESIUM SERPL-MCNC: 1.9 MG/DL
POTASSIUM SERPL-SCNC: 4.8 MMOL/L
PROT SERPL-MCNC: 5.9 G/DL
SODIUM SERPL-SCNC: 139 MMOL/L
TACROLIMUS SERPL-MCNC: 5.4 NG/ML

## 2020-02-20 ENCOUNTER — OUTPATIENT (OUTPATIENT)
Dept: OUTPATIENT SERVICES | Facility: HOSPITAL | Age: 60
LOS: 1 days | End: 2020-02-20

## 2020-02-20 DIAGNOSIS — C92.00 ACUTE MYELOBLASTIC LEUKEMIA, NOT HAVING ACHIEVED REMISSION: ICD-10-CM

## 2020-02-21 ENCOUNTER — APPOINTMENT (OUTPATIENT)
Dept: HEMATOLOGY ONCOLOGY | Facility: CLINIC | Age: 60
End: 2020-02-21
Payer: COMMERCIAL

## 2020-02-21 ENCOUNTER — RESULT REVIEW (OUTPATIENT)
Age: 60
End: 2020-02-21

## 2020-02-21 ENCOUNTER — APPOINTMENT (OUTPATIENT)
Dept: INFUSION THERAPY | Facility: HOSPITAL | Age: 60
End: 2020-02-21

## 2020-02-21 VITALS
SYSTOLIC BLOOD PRESSURE: 132 MMHG | DIASTOLIC BLOOD PRESSURE: 91 MMHG | RESPIRATION RATE: 18 BRPM | TEMPERATURE: 97.6 F | HEART RATE: 94 BPM | WEIGHT: 201.28 LBS | BODY MASS INDEX: 27.3 KG/M2 | OXYGEN SATURATION: 96 %

## 2020-02-21 LAB
ANISOCYTOSIS BLD QL: SLIGHT — SIGNIFICANT CHANGE UP
BASOPHILS # BLD AUTO: 0 K/UL — SIGNIFICANT CHANGE UP (ref 0–0.2)
BASOPHILS NFR BLD AUTO: 0.4 % — SIGNIFICANT CHANGE UP (ref 0–2)
DACRYOCYTES BLD QL SMEAR: SLIGHT — SIGNIFICANT CHANGE UP
ELLIPTOCYTES BLD QL SMEAR: SLIGHT — SIGNIFICANT CHANGE UP
EOSINOPHIL # BLD AUTO: 0.1 K/UL — SIGNIFICANT CHANGE UP (ref 0–0.5)
EOSINOPHIL NFR BLD AUTO: 1 % — SIGNIFICANT CHANGE UP (ref 0–6)
HCT VFR BLD CALC: 29.5 % — LOW (ref 39–50)
HGB BLD-MCNC: 10 G/DL — LOW (ref 13–17)
HYPERCHROMIA BLD QL AUTO: SLIGHT — SIGNIFICANT CHANGE UP
HYPOCHROMIA BLD QL: SLIGHT — SIGNIFICANT CHANGE UP
LYMPHOCYTES # BLD AUTO: 3 K/UL — SIGNIFICANT CHANGE UP (ref 1–3.3)
LYMPHOCYTES # BLD AUTO: 46 % — HIGH (ref 13–44)
MACROCYTES BLD QL: SLIGHT — SIGNIFICANT CHANGE UP
MCHC RBC-ENTMCNC: 33.9 G/DL — SIGNIFICANT CHANGE UP (ref 32–36)
MCHC RBC-ENTMCNC: 37.6 PG — HIGH (ref 27–34)
MCV RBC AUTO: 111 FL — HIGH (ref 80–100)
MONOCYTES # BLD AUTO: 0.4 K/UL — SIGNIFICANT CHANGE UP (ref 0–0.9)
MONOCYTES NFR BLD AUTO: 6 % — SIGNIFICANT CHANGE UP (ref 2–14)
NEUTROPHILS # BLD AUTO: 3.1 K/UL — SIGNIFICANT CHANGE UP (ref 1.8–7.4)
NEUTROPHILS NFR BLD AUTO: 46.6 % — SIGNIFICANT CHANGE UP (ref 43–77)
NRBC # BLD: 1 /100 — HIGH (ref 0–0)
PLAT MORPH BLD: NORMAL — SIGNIFICANT CHANGE UP
PLATELET # BLD AUTO: 22 K/UL — LOW (ref 150–400)
POIKILOCYTOSIS BLD QL AUTO: SLIGHT — SIGNIFICANT CHANGE UP
POLYCHROMASIA BLD QL SMEAR: SLIGHT — SIGNIFICANT CHANGE UP
RBC # BLD: 2.66 M/UL — LOW (ref 4.2–5.8)
RBC # FLD: 15.8 % — HIGH (ref 10.3–14.5)
RBC BLD AUTO: ABNORMAL
WBC # BLD: 6.6 K/UL — SIGNIFICANT CHANGE UP (ref 3.8–10.5)
WBC # FLD AUTO: 6.6 K/UL — SIGNIFICANT CHANGE UP (ref 3.8–10.5)

## 2020-02-21 PROCEDURE — 99214 OFFICE O/P EST MOD 30 MIN: CPT

## 2020-02-22 ENCOUNTER — OUTPATIENT (OUTPATIENT)
Dept: OUTPATIENT SERVICES | Facility: HOSPITAL | Age: 60
LOS: 1 days | Discharge: HOME | End: 2020-02-22

## 2020-02-22 DIAGNOSIS — C92.00 ACUTE MYELOBLASTIC LEUKEMIA, NOT HAVING ACHIEVED REMISSION: ICD-10-CM

## 2020-02-22 NOTE — ASSESSMENT
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 with the following comorbidities being managed:\par \par 1) AML\par S/p haplo (son) PSCT on 10/9/19\par 12/4/19 chimerism: 100% donor\par 12/24/19 chimerism = 100% donor (CD33 = 100% and CD3 = 99.3% donor)\par 1/8/20 chimerism = 100% donor\par 2/5/20 chimerism = 100% donor\par Post transplant BMBx pending approx day +120-150\par \par 2) Heme\par ABO compatible transplant: pt and donor are both A pos\par Ongoing anemia and thrombocytopenia poss 2/2 Valcyte, d/c'd as of 2/14/20\par No indication for transfusion today\par    2/21/20:   WBC 6.6   ANC 3.1   Hgb 10   PLT 22\par Scheduled for poss PLT on 2/26 after provider visit \par Continue multivitamin and folic acid daily\par \par Hx of recurrent DVT/PE\par Factor V Leiden reportedly runs in the family \par 11/21/19 Doppler US of RUE negative for DVT\par Xarelto on hold while pancytopenic, to resume when PLT consistently >100K\par \par 3) ID\par Continue ppx:\par - Mepron 750mg bid\par - Voriconazole 200mg bid - hx of aspergillosis \par Post transplant crowd and dietary restrictions reviewed\par \par CMV viremia\par 11/7/19 CMV PCR = 247\par 11/14/19 CMV PCR = 2,205\par 11/20/19 CMV PCR = 8,581\par 11/26/19 CMV PCR = 4103\par 12/4/19 CMV PCR = 215\par CMV PCR not detected as of 12/10/19\par Valcyte 450mg BID started 11/21/19. Decreased to QD as of 12/17/19. Held as of 2/14/20 for ongoing thrombocytopenia \par Continue to monitor PCR weekly \par \par 4) GVHD\par Skin 1   Liver 0   GI 0 - overall grade 1\par No signs of chronic GVHD at this time\par Off MMF as of 11/22/19 \par Continue FK 0.5mg daily - pending today's level\par Prednisone increased to 20mg daily as of 2/14/20 for skin GVHD as below\par Reviewed signs/symptoms of GVHD (i.e. rash, mucositis, nausea/vomiting, diarrhea)\par \par aGVHD of skin \par Maculopapular pruritic rash of face, neck, and upper chest (BSA 19%) noted 1/21/20 in setting of tapering prednisone (for FTT) - max skin stage 1, overall grade 1\par 1/21/20: rash on face, neck, and chest (BSA 19%); skin 1, overall grade 1; prednisone increased to 10mg daily\par 2/14/20: rash on neck, chest, and bilateral upper arms (BSA 18%); skin 1, overall grade 1: prednisone increased to 20mg daily\par 2/21/20: rash on neck and chest (BSA 10% BSA); skin 1, overall grade 1. Continue prednisone 20mg daily \par Continue triamcinolone 0.1% cream bid\par Continue to monitor closely \par \par 5) GI\par Continue ppx:\par - Protonix 40 mg daily\par - Ursodiol 300 mg bid\par PRN Zofran and Reglan for nausea/vomiting\par \par 6) Failure to thrive\par Poor PO intake/hydration with resulting hypotension\par Prednisone 20 mg daily started 11/20/19 with resolution of symptoms\par RESOLVED\par \par 7) Other\par HTN - continue metoprolol succinate 12.5mg bid. Continue to hold enalapril 2.5mg daily in light of recent hypotension\par BPH - continue finasteride 5mg daily and tamsulosin 0.4mg daily \par Hypomagnesemia - likely 2/2 FK, continue MgOx 400mg tid\par Anxiety/Depression - continue Zoloft 50mg daily and PRN Xanax 0.5mg qhs\par \par 8) Plan/Dispo\par Pt educated regarding plan of care, all questions/concerns addressed\par Continue weekly appts at this time\par F/u with Dr. Gaytan on 2/26/20

## 2020-02-22 NOTE — HISTORY OF PRESENT ILLNESS
[de-identified] : Mr. Delong is a 58 y/o male, with a previously diagnosed acute myeloid leukemia. A bone marrow biopsy from 7/3 revealed Acute myeloid leukemia (AML). FISH - report shows a population of aberrant myeloid blasts. He is currently being treated by Dr. Coelho for high risk AML with HIDAC consolidation chemotherapy. He was referred by Dr. Coelho for an initial consultation of a Haplo- transplant. \par \par The patient has a history of multiple blood clots - factor V Leiden runs in the family. He was diagnosed with sleep apnea, but refuses to use CPAP. He is a former smoker, 40 years. He also has a past medical history of PE, HTN, cardiomyopathy. He recently underwent an amputation of the right first toe due to an infection.\par \par S/p hospitalization at [facility] 10/3/19 - 11/1/19 for haplo (son) SCT. \par Upon admission, a TLC was placed in IR. Mr. Delong received IV fluid hydration, pain management, nutritional support, anxiolysis, antiemetics, and antiviral,  antifungal, antibacterial, GI, PCP and VOD prophylaxis. When his ANC dropped  below 1000, he was started on prophylactic Cefepime. Labs were monitored on a daily basis and he received transfusional support and electrolyte repletion as needed. \par \par While in patient, Mr. Delong was continued on his Voriconazole for history of aspergillosis. \par \par S/p hospitalization at [facility]MT[facility] 10/3/19 - 11/1/19 for haplo (son) SCT. \par Upon admission, a TLC was placed in IR. Mr. Delong received IV fluid hydration, pain management, nutritional support, anxiolysis, antiemetics, and antiviral, antifungal, antibacterial, GI, PCP and VOD prophylaxis. When his ANC dropped below 1000, he was started on prophylactic Cefepime. Labs were monitored on a daily basis and he received transfusional support and electrolyte repletion as needed. \par \par While in patient, Mr. Delong was continued on his Voriconazole for history of aspergillosis. \par \par During admission, Mr. Delong had pancytopenia related to the high dose chemotherapy prep regimen. He also experienced neutropenic fevers. When he became febrile, blood and urine cultures were sent and a CXR was completed which were unremarkable. \par \par On 10/9/2019, following premedications, pt received 250 ml of allogeneic, related, haplo, fresh, mobilized HPC apheresis over 30-60 minutes. \par Cell counts as follows: \par \par Total MNCs (x10^8/kg) = 5.17 \par CD 34 cells (x10^6/kg) = 7.34 \par CD 3 Cells (x 10^7/kg) = 19.81 \par Cell viability (%) = 100 \par \par Pt tolerated infusion without any adverse reaction or complications. \par \par Engraftment was noted on 10/28/2019. Daily Zarxio was discontinued, as was Cefepime given negative cultures. Mr. Delong was discharged home to f/u at the [facility].  [de-identified] : On 2/21/20 visit, day +135 of SCT today. Overall continues to feel well. Reports good PO intake/hydration. Stable SOB with exertion that resolves with rest. Rash is improved, no pruritus and now only involving neck and chest. Compliant with post transplant meds and restrictions. Currently taking FK 0.5mg daily which he did not take prior to lab draw today. Continues on prednisone 20mg daily. Denies fever, chills, headache, dizziness, blurred vision, mucositis/odynophagia, chest pain, cough, nausea/vomiting, diarrhea/constipation, abdominal pain, dysuria, LE edema, bleeding, pain\par

## 2020-02-22 NOTE — PHYSICAL EXAM
[Ambulatory and capable of all self care but unable to carry out any work activities] : Status 2- Ambulatory and capable of all self care but unable to carry out any work activities. Up and about more than 50% of waking hours [Normal] : affect appropriate [de-identified] : no edema [de-identified] : +BS; abdomen soft, non-distended, non-tender [de-identified] : s/p right first toe amputation.  [de-identified] : erythematous rash of neck and chest

## 2020-02-22 NOTE — REVIEW OF SYSTEMS
[Fever] : no fever [Fatigue] : fatigue [Eye Pain] : no eye pain [Dry Eyes] : no dryness of the eyes [Vision Problems] : no vision problems [Dysphagia] : no dysphagia [Nosebleeds] : no nosebleeds [Odynophagia] : no odynophagia [Mucosal Pain] : no mucosal pain [Chest Pain] : no chest pain [Lower Ext Edema] : no lower extremity edema [Cough] : no cough [SOB on Exertion] : shortness of breath during exertion [Abdominal Pain] : no abdominal pain [Vomiting] : no vomiting [Constipation] : no constipation [Diarrhea] : no diarrhea [Dysuria] : no dysuria [Joint Pain] : no joint pain [Muscle Pain] : no muscle pain [Skin Rash] : skin rash [Dizziness] : no dizziness [Fainting] : no fainting [Insomnia] : insomnia [Negative] : Heme/Lymph [FreeTextEntry8] : . [de-identified] : rash on neck and chest [de-identified] : manageable with Xanax

## 2020-02-24 LAB
ALBUMIN SERPL ELPH-MCNC: 4.1 G/DL
ALP BLD-CCNC: 61 U/L
ALT SERPL-CCNC: 9 U/L
ANION GAP SERPL CALC-SCNC: 13 MMOL/L
AST SERPL-CCNC: 14 U/L
BILIRUB SERPL-MCNC: 0.2 MG/DL
BUN SERPL-MCNC: 21 MG/DL
CALCIUM SERPL-MCNC: 8.9 MG/DL
CHLORIDE SERPL-SCNC: 104 MMOL/L
CMV DNA SPEC QL NAA+PROBE: NOT DETECTED
CMVPCR LOG: NOT DETECTED LOG10IU/ML
CO2 SERPL-SCNC: 23 MMOL/L
CREAT SERPL-MCNC: 0.96 MG/DL
GLUCOSE SERPL-MCNC: 140 MG/DL
LDH SERPL-CCNC: 262 U/L
MAGNESIUM SERPL-MCNC: 1.7 MG/DL
POTASSIUM SERPL-SCNC: 4.7 MMOL/L
PROT SERPL-MCNC: 5.8 G/DL
SODIUM SERPL-SCNC: 139 MMOL/L
TACROLIMUS SERPL-MCNC: 5.5 NG/ML

## 2020-02-25 LAB
CMV DNA SPEC QL NAA+PROBE: NOT DETECTED
ENGRAFTMNET-POST: NORMAL

## 2020-02-26 ENCOUNTER — RESULT REVIEW (OUTPATIENT)
Age: 60
End: 2020-02-26

## 2020-02-26 ENCOUNTER — APPOINTMENT (OUTPATIENT)
Dept: HEMATOLOGY ONCOLOGY | Facility: CLINIC | Age: 60
End: 2020-02-26
Payer: COMMERCIAL

## 2020-02-26 ENCOUNTER — APPOINTMENT (OUTPATIENT)
Dept: INFUSION THERAPY | Facility: HOSPITAL | Age: 60
End: 2020-02-26

## 2020-02-26 VITALS
RESPIRATION RATE: 20 BRPM | TEMPERATURE: 98.4 F | DIASTOLIC BLOOD PRESSURE: 73 MMHG | OXYGEN SATURATION: 96 % | SYSTOLIC BLOOD PRESSURE: 117 MMHG | WEIGHT: 198.39 LBS | BODY MASS INDEX: 26.91 KG/M2 | HEART RATE: 93 BPM

## 2020-02-26 LAB
BASOPHILS # BLD AUTO: 0 K/UL — SIGNIFICANT CHANGE UP (ref 0–0.2)
BASOPHILS NFR BLD AUTO: 0.8 % — SIGNIFICANT CHANGE UP (ref 0–2)
EOSINOPHIL # BLD AUTO: 0 K/UL — SIGNIFICANT CHANGE UP (ref 0–0.5)
EOSINOPHIL NFR BLD AUTO: 0.4 % — SIGNIFICANT CHANGE UP (ref 0–6)
HCT VFR BLD CALC: 27.6 % — LOW (ref 39–50)
HGB BLD-MCNC: 9.2 G/DL — LOW (ref 13–17)
LYMPHOCYTES # BLD AUTO: 2.8 K/UL — SIGNIFICANT CHANGE UP (ref 1–3.3)
LYMPHOCYTES # BLD AUTO: 45.4 % — HIGH (ref 13–44)
MCHC RBC-ENTMCNC: 33.4 G/DL — SIGNIFICANT CHANGE UP (ref 32–36)
MCHC RBC-ENTMCNC: 37.2 PG — HIGH (ref 27–34)
MCV RBC AUTO: 111 FL — HIGH (ref 80–100)
MONOCYTES # BLD AUTO: 0.3 K/UL — SIGNIFICANT CHANGE UP (ref 0–0.9)
MONOCYTES NFR BLD AUTO: 5.5 % — SIGNIFICANT CHANGE UP (ref 2–14)
NEUTROPHILS # BLD AUTO: 3 K/UL — SIGNIFICANT CHANGE UP (ref 1.8–7.4)
NEUTROPHILS NFR BLD AUTO: 47.9 % — SIGNIFICANT CHANGE UP (ref 43–77)
PLATELET # BLD AUTO: 22 K/UL — LOW (ref 150–400)
RBC # BLD: 2.48 M/UL — LOW (ref 4.2–5.8)
RBC # FLD: 16.5 % — HIGH (ref 10.3–14.5)
TACROLIMUS SERPL-MCNC: 4.8 NG/ML
WBC # BLD: 6.2 K/UL — SIGNIFICANT CHANGE UP (ref 3.8–10.5)
WBC # FLD AUTO: 6.2 K/UL — SIGNIFICANT CHANGE UP (ref 3.8–10.5)

## 2020-02-26 PROCEDURE — 99215 OFFICE O/P EST HI 40 MIN: CPT

## 2020-02-27 LAB
ALBUMIN SERPL ELPH-MCNC: 4.3 G/DL
ALP BLD-CCNC: 66 U/L
ALT SERPL-CCNC: 12 U/L
ANION GAP SERPL CALC-SCNC: 15 MMOL/L
AST SERPL-CCNC: 17 U/L
BILIRUB SERPL-MCNC: 0.2 MG/DL
BUN SERPL-MCNC: 25 MG/DL
CALCIUM SERPL-MCNC: 9 MG/DL
CHLORIDE SERPL-SCNC: 105 MMOL/L
CMV DNA SPEC QL NAA+PROBE: NOT DETECTED
CMVPCR LOG: NOT DETECTED LOG10IU/ML
CO2 SERPL-SCNC: 20 MMOL/L
CREAT SERPL-MCNC: 0.91 MG/DL
GLUCOSE SERPL-MCNC: 192 MG/DL
LDH SERPL-CCNC: 301 U/L
MAGNESIUM SERPL-MCNC: 1.8 MG/DL
POTASSIUM SERPL-SCNC: 5.1 MMOL/L
PROT SERPL-MCNC: 5.9 G/DL
SODIUM SERPL-SCNC: 139 MMOL/L

## 2020-02-28 LAB — ENGRAFTMNET-POST: NORMAL

## 2020-03-02 NOTE — ADDENDUM
[FreeTextEntry1] : Documented by J Carlos Babb acting as a scribe for Dr. Rosina Gaytan on 02/26/2020 \par All medical record entries made by the Scribe were at my, Dr. Rosina Gaytan, direction and personally dictated by me on 02/26/2020. I have reviewed the chart and agree that the record accurately reflects my personal performance of the history, physical exam, assessment and plan. I have also personally directed, reviewed, and agree with the discharge instructions.\par \par \par

## 2020-03-02 NOTE — ASSESSMENT
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 with the following comorbidities being managed: Ashlyn on phone \par \par 1) AML\par S/p haplo (son) PSCT on 10/9/19\par 10/30/19 chimerism = 100% donor\par 11/14/19 chimerism = 100% donor\par 11/26/19 chimerism = 100% donor\par 12/4/19 chimerism: 100% donor\par 12/24/19 chimerism = 100% donor\par last visit 100% as well\par Post transplant BMBx pending\par \par 2) Heme\par Counts stable but remains pancytopenic, no indication for transfusion today.\par WBC 6.2 HGB 9.2  ANC 3.0  PLT 22\par Monitor counts closely while on Valcyte, started 11/20/19 . - DC \par Continue multivitamin and folic acid daily\par \par Hx of recurrent DVT/PE\par Factor V Leiden reportedly runs in the family \par 11/21/19 Doppler US of RUE negative for DVT\par Xarelto on hold while pancytopenic, to resume when PLT consistently >100K\par \par 3) ID\par Continue ppx:\par - Mepron 750 mg bid\par - Voriconazole 200 mg bid - hx of aspergillosis \par Post transplant crowd and dietary restrictions reviewed\par \par CMV viremia\par 11/7/19 CMV PCR = 247\par 11/14/19 CMV PCR = 2,205\par 11/20/19 CMV PCR = 8,581\par 11/26/19 CMV PCR = 4103\par 12/4/19 CMV PCR = 215\par 12/10/19 CMV PCR= not detected\par 12/24/19 CMV PCR = not detected\par Valcyte 450mg BID started 11/21/19 - DC ..acyclovir restarted\par Continue to monitor PCR weekly \par \par 4) GVHD..no acute  today\par Last visit..Skin 1 Liver 0 GI 0 - overall grade 1....6 percent skin erythema\par No signs/symptoms of  chronic GVHD at this time\par Continue  FK 0.5mg daily \par Off MMF as of 11/22/19 \par Reviewed signs/symptoms of GVHD (i.e. rash, mucositis, nausea/vomiting, diarrhea)\par \par 5) GI\par Continue ppx:\par - Protonix 40 mg daily\par - Ursodiol 300 mg bid\par PRN Zofran and Reglan for nausea/vomiting\par \par 6) Failure to thrive\par Poor PO intake/hydration with resulting hypotension\par Prednisone 20 mg daily started 11/20/19... will decrease to 15mg  daily as of 2/26/20 ...increased fluid retention evident by bilateral LE edema \par Decrease IV fluid to once a week until PO intake improves, 750mL/2hrs given cardiomyopathy - cancelling IV fluid for 12/17/19, 12/24/19, and 1/2/20.\par Encouraged to hydrate adequately and eat small frequent meals/snacks\par \par 7) Other\par HTN - enalapril 2.5mg daily and metoprolol succinate 12.5mg daily on hold in setting of hypotension and FTT. Back on metoprolol daily - increase to BID as of 1/2/20\par BPH - continue finasteride 5mg daily and tamsulosin 0.4mg daily \par Hypomagnesemia - likely 2/2 FK, continue MgOx 400mg tid\par Anxiety/Depression - continue Zoloft 50mg daily and PRN Xanax 0.5mg qhs\par \par 8) Plan/Dispo\par Pt educated regarding plan of care, all questions/concerns addressed\par Peripheral blood work reviewed and discussed with patient.\par Will obtain a repeat CT  scan \par Labs sent for CMP, LDH, Mg, CD3 and CD33 Enrichment, Engraftment, Tacrolimus Whole Blood, CMV by PCR.\par Increase Metoprolol to BID as of 1/2/20.\par Continue Prednisone 15mg QD\par Continue FK 0.5mg QD\par Renewed Tamsulosin on 1/2/20.\par Decrease Valcyte 450mg to QD as of 12/17/19 - DC \par Restarted Acyclovir 400mg BID as of 2/26/20\par Patient instructed to finish the course of Levaquin Abx as of 2/5/20\par Pt instructed to use Lasix daily for 4 days and then prn. \par Advised to continue to use Hydrocortisone cream and moisturizer for erythema and itchiness prn.\par Advised against travel at this time.\par Maintain weekly appointments at this time.\par RTC for f/u with YADIRA Banda 3/3/20 and YADIRA Arias 3/12/20.  RTC for f/u with Dr. Gaytan on 4/9/20.

## 2020-03-02 NOTE — PHYSICAL EXAM
[Ambulatory and capable of all self care but unable to carry out any work activities] : Status 2- Ambulatory and capable of all self care but unable to carry out any work activities. Up and about more than 50% of waking hours [Normal] : affect appropriate [de-identified] : bilateral LE edema  [de-identified] : s/p right first toe amputation.

## 2020-03-02 NOTE — REVIEW OF SYSTEMS
[Fatigue] : fatigue [SOB on Exertion] : shortness of breath during exertion [Negative] : Allergic/Immunologic [Recent Change In Weight] : ~T recent weight change [Lower Ext Edema] : lower extremity edema [Skin Rash] : skin rash [FreeTextEntry8] : . [FreeTextEntry5] : 1+ left  LE edema, 2+ Right LE [de-identified] : mild erythema on chest and neck - improving. Skin rash - resolved

## 2020-03-02 NOTE — HISTORY OF PRESENT ILLNESS
[de-identified] : Mr. Delong is a 58 y/o male, with a previously diagnosed acute myeloid leukemia. A bone marrow biopsy from 7/3 revealed Acute myeloid leukemia (AML). FISH - report shows a population of aberrant myeloid blasts. He is currently being treated by Dr. Coelho for high risk AML with HIDAC consolidation chemotherapy. He was referred by Dr. Coelho for an initial consultation of a Haplo- transplant. \par \par The patient has a history of multiple blood clots - factor V Leiden runs in the family. He was diagnosed with sleep apnea, but refuses to use CPAP. He is a former smoker, 40 years. He also has a past medical history of PE, HTN, cardiomyopathy. He recently underwent an amputation of the right first toe due to an infection.\par \par S/p hospitalization at [facility] 10/3/19 - 11/1/19 for haplo (son) SCT. \par Upon admission, a TLC was placed in IR. Mr. Delong received IV fluid hydration, pain management, nutritional support, anxiolysis, antiemetics, and antiviral,  antifungal, antibacterial, GI, PCP and VOD prophylaxis. When his ANC dropped  below 1000, he was started on prophylactic Cefepime. Labs were monitored on a daily basis and he received transfusional support and electrolyte repletion as needed. \par \par While in patient, Mr. Delong was continued on his Voriconazole for history of aspergillosis. \par \par S/p hospitalization at [facility] 10/3/19 - 11/1/19 for haplo (son) SCT. \par Upon admission, a TLC was placed in IR. Mr. Delong received IV fluid hydration, pain management, nutritional support, anxiolysis, antiemetics, and antiviral, antifungal, antibacterial, GI, PCP and VOD prophylaxis. When his ANC dropped below 1000, he was started on prophylactic Cefepime. Labs were monitored on a daily basis and he received transfusional support and electrolyte repletion as needed. \par \par While in patient, Mr. Delong was continued on his Voriconazole for history of aspergillosis. \par \par During admission, Mr. Delong had pancytopenia related to the high dose chemotherapy prep regimen. He also experienced neutropenic fevers. When he became febrile, blood and urine cultures were sent and a CXR was completed which were unremarkable. \par \par On 10/9/2019, following premedications, pt received 250 ml of allogeneic, related, haplo, fresh, mobilized HPC apheresis over 30-60 minutes. \par Cell counts as follows: \par \par Total MNCs (x10^8/kg) = 5.17 \par CD 34 cells (x10^6/kg) = 7.34 \par CD 3 Cells (x 10^7/kg) = 19.81 \par Cell viability (%) = 100 \par \par Pt tolerated infusion without any adverse reaction or complications. \par \par Engraftment was noted on 10/28/2019. Daily Zarxio was discontinued, as was Cefepime given negative cultures. Mr. Delong was discharged home to f/u at the [facility].  [de-identified] : Pt presents today for a f/u visit. Pt states he is generally feeling well today. His chief complaint is fatigue and GEORGES. He states he has not been sleeping well. Pt has LE edema bilaterally.\par 1+ Left LE edema  and 2+ Right leg edema.  He shares no rash is present at this time and denies feeling any itchiness sx. Pt shares the itchiness and rash have resolved after the increase in Prednisone.  Pt states he is currently on 20mg Prednisone q daily and 0.5mgFK q daily. Pt has increased sensitivity to Prednisone and is instructed to lower dose to 15mg q daily.  Pt is off Valcyte medication. Remains complaint with all other medications. Denies fever/chills, night sweats, mouth sores, eye dryness, blurred vision, nausea, vomiting, diarrhea. No CP. \par \par \par

## 2020-03-03 ENCOUNTER — RESULT REVIEW (OUTPATIENT)
Age: 60
End: 2020-03-03

## 2020-03-03 ENCOUNTER — LABORATORY RESULT (OUTPATIENT)
Age: 60
End: 2020-03-03

## 2020-03-03 ENCOUNTER — APPOINTMENT (OUTPATIENT)
Dept: HEMATOLOGY ONCOLOGY | Facility: CLINIC | Age: 60
End: 2020-03-03
Payer: COMMERCIAL

## 2020-03-03 VITALS
TEMPERATURE: 98.1 F | OXYGEN SATURATION: 96 % | HEART RATE: 88 BPM | SYSTOLIC BLOOD PRESSURE: 119 MMHG | RESPIRATION RATE: 17 BRPM | DIASTOLIC BLOOD PRESSURE: 81 MMHG | WEIGHT: 199.5 LBS | BODY MASS INDEX: 27.06 KG/M2

## 2020-03-03 LAB
BASOPHILS # BLD AUTO: 0.05 K/UL — SIGNIFICANT CHANGE UP (ref 0–0.2)
BASOPHILS NFR BLD AUTO: 1 % — SIGNIFICANT CHANGE UP (ref 0–2)
EOSINOPHIL # BLD AUTO: 0.09 K/UL — SIGNIFICANT CHANGE UP (ref 0–0.5)
EOSINOPHIL NFR BLD AUTO: 1.7 % — SIGNIFICANT CHANGE UP (ref 0–6)
HCT VFR BLD CALC: 27.9 % — LOW (ref 39–50)
HGB BLD-MCNC: 9.4 G/DL — LOW (ref 13–17)
LYMPHOCYTES # BLD AUTO: 2.35 K/UL — SIGNIFICANT CHANGE UP (ref 1–3.3)
LYMPHOCYTES # BLD AUTO: 44.3 % — HIGH (ref 13–44)
MCHC RBC-ENTMCNC: 33.6 G/DL — SIGNIFICANT CHANGE UP (ref 32–36)
MCHC RBC-ENTMCNC: 37.4 PG — HIGH (ref 27–34)
MCV RBC AUTO: 111 FL — HIGH (ref 80–100)
MONOCYTES # BLD AUTO: 0.46 K/UL — SIGNIFICANT CHANGE UP (ref 0–0.9)
MONOCYTES NFR BLD AUTO: 8.6 % — SIGNIFICANT CHANGE UP (ref 2–14)
NEUTROPHILS # BLD AUTO: 2.36 K/UL — SIGNIFICANT CHANGE UP (ref 1.8–7.4)
NEUTROPHILS NFR BLD AUTO: 44.5 % — SIGNIFICANT CHANGE UP (ref 43–77)
PLATELET # BLD AUTO: 23 K/UL — LOW (ref 150–400)
RBC # BLD: 2.5 M/UL — LOW (ref 4.2–5.8)
RBC # FLD: 16.9 % — HIGH (ref 10.3–14.5)
WBC # BLD: 5.3 K/UL — SIGNIFICANT CHANGE UP (ref 3.8–10.5)
WBC # FLD AUTO: 5.3 K/UL — SIGNIFICANT CHANGE UP (ref 3.8–10.5)

## 2020-03-03 PROCEDURE — 99215 OFFICE O/P EST HI 40 MIN: CPT

## 2020-03-03 RX ORDER — VALGANCICLOVIR HYDROCHLORIDE 450 MG/1
450 TABLET ORAL
Qty: 60 | Refills: 3 | Status: DISCONTINUED | COMMUNITY
Start: 2019-11-18 | End: 2020-03-03

## 2020-03-04 LAB — NRBC # BLD: 0 /100 WBCS — SIGNIFICANT CHANGE UP (ref 0–0)

## 2020-03-05 LAB
CD3 AND CD33 ENRICHMENT: NORMAL
ENGRAFTMNET-POST: NORMAL

## 2020-03-06 NOTE — PHYSICAL EXAM
[Ambulatory and capable of all self care but unable to carry out any work activities] : Status 2- Ambulatory and capable of all self care but unable to carry out any work activities. Up and about more than 50% of waking hours [Normal] : affect appropriate [de-identified] : +1 right lower extremity edema [de-identified] : s/p right first toe amputation.

## 2020-03-06 NOTE — REVIEW OF SYSTEMS
[Lower Ext Edema] : lower extremity edema [Chest Pain] : no chest pain [Shortness Of Breath] : shortness of breath [Negative] : Integumentary [FreeTextEntry5] :  1+ Right LE edema [FreeTextEntry8] : .

## 2020-03-06 NOTE — HISTORY OF PRESENT ILLNESS
[de-identified] : Mr. Delong is a 60 y/o male, with a previously diagnosed acute myeloid leukemia. A bone marrow biopsy from 7/3 revealed Acute myeloid leukemia (AML). FISH - report shows a population of aberrant myeloid blasts. He is currently being treated by Dr. Coelho for high risk AML with HIDAC consolidation chemotherapy. He was referred by Dr. Coelho for an initial consultation of a Haplo- transplant. \par \par The patient has a history of multiple blood clots - factor V Leiden runs in the family. He was diagnosed with sleep apnea, but refuses to use CPAP. He is a former smoker, 40 years. He also has a past medical history of PE, HTN, cardiomyopathy. He recently underwent an amputation of the right first toe due to an infection.\par \par S/p hospitalization at [facility] 10/3/19 - 11/1/19 for haplo (son) SCT. \par Upon admission, a TLC was placed in IR. Mr. Delong received IV fluid hydration, pain management, nutritional support, anxiolysis, antiemetics, and antiviral,  antifungal, antibacterial, GI, PCP and VOD prophylaxis. When his ANC dropped  below 1000, he was started on prophylactic Cefepime. Labs were monitored on a daily basis and he received transfusional support and electrolyte repletion as needed. \par \par While in patient, Mr. Delong was continued on his Voriconazole for history of aspergillosis. \par \par S/p hospitalization at [facility] 10/3/19 - 11/1/19 for haplo (son) SCT. \par Upon admission, a TLC was placed in IR. Mr. Delong received IV fluid hydration, pain management, nutritional support, anxiolysis, antiemetics, and antiviral, antifungal, antibacterial, GI, PCP and VOD prophylaxis. When his ANC dropped below 1000, he was started on prophylactic Cefepime. Labs were monitored on a daily basis and he received transfusional support and electrolyte repletion as needed. \par \par While in patient, Mr. Delong was continued on his Voriconazole for history of aspergillosis. \par \par During admission, Mr. Delong had pancytopenia related to the high dose chemotherapy prep regimen. He also experienced neutropenic fevers. When he became febrile, blood and urine cultures were sent and a CXR was completed which were unremarkable. \par \par On 10/9/2019, following premedications, pt received 250 ml of allogeneic, related, haplo, fresh, mobilized HPC apheresis over 30-60 minutes. \par Cell counts as follows: \par \par Total MNCs (x10^8/kg) = 5.17 \par CD 34 cells (x10^6/kg) = 7.34 \par CD 3 Cells (x 10^7/kg) = 19.81 \par Cell viability (%) = 100 \par \par Pt tolerated infusion without any adverse reaction or complications. \par \par Engraftment was noted on 10/28/2019. Daily Zarxio was discontinued, as was Cefepime given negative cultures. Mr. Delong was discharged home to f/u at the [facility].  [de-identified] : Pt presents today for a f/u visit. Pt states he is generally feeling well today. His chief complaint is fatigue and GEORGES. He states he has not been sleeping well. Pt has LE edema bilaterally.1+ Left LE edema  and 2+ Right leg edema.  He shares no rash is present at this time and denies feeling any itchiness sx. Pt shares the itchiness and rash have resolved after the increase in Prednisone.  Pt states he is currently on 20mg Prednisone q daily and 0.5mgFK q daily. Pt has increased sensitivity to Prednisone and is instructed to lower dose to 15mg q daily.  Pt is off Valcyte medication. Remains complaint with all other medications. Denies fever/chills, night sweats, mouth sores, eye dryness, blurred vision, nausea, vomiting, diarrhea. No CP. \par \par On 3/03/20 visit, day + 146 of SCT today. Overall patient is well and offers no acute concerns. Currently on FK 0.5 mg daily and prednisone 15 mg daily. Patient complains of shortness of  breath when laying flat since stopping lasix. Denies fever, chills, nausea, vomiting, diarrhea, rash, mouth sores, dysuria or any signs of active bleeding. Denies chest pain. Bilateral lower extremity edema improved, +1 right leg edema. Remains compliant with post transplant diet and crowd restrictions. Remains compliant with post transplant medications.

## 2020-03-06 NOTE — ASSESSMENT
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 with the following comorbidities being managed: \par \par 1) AML\par S/p haplo (son) PSCT on 10/9/19\par 10/30/19 chimerism = 100% donor\par 11/14/19 chimerism = 100% donor\par 11/26/19 chimerism = 100% donor\par 12/4/19 chimerism: 100% donor\par 12/24/19 chimerism = 100% donor\par 2/21/20 chimerism= 100% donor\par Post transplant BMBx pending\par \par 2) Heme\par Counts stable but remains pancytopenic, no indication for transfusion today.\par WBC 5.3 HGB 9.4  ANC 2.3  PLT 23\par Valcyte started  on 11/20/19- now discontinued.\par Continue multivitamin and folic acid daily\par \par Hx of recurrent DVT/PE\par Factor V Leiden reportedly runs in the family \par 11/21/19 Doppler US of RUE negative for DVT\par Xarelto on hold while pancytopenic, to resume when PLT consistently >100K\par \par 3) ID\par Continue ppx:\par - Mepron 750 mg bid\par - Voriconazole 200 mg bid - hx of aspergillosis \par -Acyclovir 400 mg BID as of 2/26/20\par Post transplant crowd and dietary restrictions reviewed\par \par CMV viremia\par 11/7/19 CMV PCR = 247\par 11/14/19 CMV PCR = 2,205\par 11/20/19 CMV PCR = 8,581\par 11/26/19 CMV PCR = 4103\par 12/4/19 CMV PCR = 215\par 12/10/19 CMV PCR= not detected\par 12/24/19 CMV PCR = not detected\par Valcyte 450mg BID started 11/21/19 - Discontinued and now on acyclovir BID as of 2/26/20\par Continue to monitor PCR weekly \par \par 4) GVHD\par Skin 0 Liver 0 GI 0- overall grade 0\par No signs/symptoms of  chronic GVHD at this time\par Continue  FK 0.5mg daily \par Off MMF as of 11/22/19 \par Reviewed signs/symptoms of GVHD (i.e. rash, mucositis, nausea/vomiting, diarrhea)\par \par 5) GI\par Continue ppx:\par - Protonix 40 mg daily\par - Ursodiol 300 mg bid\par PRN Zofran and Reglan for nausea/vomiting\par \par 6) Failure to thrive\par Poor PO intake/hydration with resulting hypotension\par Prednisone 20 mg daily started 11/20/19. Currently on prednisone 15 mg daily.\par Encouraged to hydrate adequately and eat small frequent meals/snacks\par \par 7) Other\par HTN - enalapril 2.5mg daily and metoprolol succinate 12.5mg daily on hold in setting of hypotension and FTT. Back on metoprolol daily - increase to BID as of 1/2/20\par BPH - continue finasteride 5mg daily and tamsulosin 0.4mg daily \par Hypomagnesemia - likely 2/2 FK, continue MgOx 400mg tid\par Anxiety/Depression - continue Zoloft 50mg daily and PRN Xanax 0.5mg qhs\par Continue lasix 20 mg daily. Encouraged patient to follow up with cardiologist. Has a follow up appointment with Cardiologist end of March 2020. Echo ordered\par \par 8) Plan/Dispo\par Pt educated regarding plan of care, all questions/concerns addressed\par Peripheral blood work reviewed and discussed with patient.\par Will obtain a repeat CT  scan\par Maintain weekly appointments at this time.\par RTC for f/u with YADIRA Arias 3/12/20.  RTC for f/u with Dr. Gaytan on 4/9/20.

## 2020-03-11 ENCOUNTER — FORM ENCOUNTER (OUTPATIENT)
Age: 60
End: 2020-03-11

## 2020-03-12 ENCOUNTER — APPOINTMENT (OUTPATIENT)
Dept: HEMATOLOGY ONCOLOGY | Facility: CLINIC | Age: 60
End: 2020-03-12
Payer: COMMERCIAL

## 2020-03-12 ENCOUNTER — RESULT REVIEW (OUTPATIENT)
Age: 60
End: 2020-03-12

## 2020-03-12 ENCOUNTER — RX RENEWAL (OUTPATIENT)
Age: 60
End: 2020-03-12

## 2020-03-12 ENCOUNTER — OUTPATIENT (OUTPATIENT)
Dept: OUTPATIENT SERVICES | Facility: HOSPITAL | Age: 60
LOS: 1 days | End: 2020-03-12
Payer: COMMERCIAL

## 2020-03-12 ENCOUNTER — APPOINTMENT (OUTPATIENT)
Dept: RADIOLOGY | Facility: IMAGING CENTER | Age: 60
End: 2020-03-12
Payer: COMMERCIAL

## 2020-03-12 VITALS
RESPIRATION RATE: 18 BRPM | WEIGHT: 205.47 LBS | TEMPERATURE: 98 F | HEART RATE: 99 BPM | DIASTOLIC BLOOD PRESSURE: 90 MMHG | OXYGEN SATURATION: 96 % | SYSTOLIC BLOOD PRESSURE: 117 MMHG | BODY MASS INDEX: 27.87 KG/M2

## 2020-03-12 DIAGNOSIS — C92.00 ACUTE MYELOBLASTIC LEUKEMIA, NOT HAVING ACHIEVED REMISSION: ICD-10-CM

## 2020-03-12 LAB
ALBUMIN SERPL ELPH-MCNC: 4.2 G/DL
ALP BLD-CCNC: 69 U/L
ALT SERPL-CCNC: 13 U/L
ANION GAP SERPL CALC-SCNC: 11 MMOL/L
AST SERPL-CCNC: 15 U/L
BASOPHILS # BLD AUTO: 0.01 K/UL — SIGNIFICANT CHANGE UP (ref 0–0.2)
BASOPHILS NFR BLD AUTO: 0.1 % — SIGNIFICANT CHANGE UP (ref 0–2)
BILIRUB SERPL-MCNC: 0.4 MG/DL
BUN SERPL-MCNC: 27 MG/DL
CALCIUM SERPL-MCNC: 9 MG/DL
CHLORIDE SERPL-SCNC: 108 MMOL/L
CO2 SERPL-SCNC: 23 MMOL/L
CREAT SERPL-MCNC: 1.03 MG/DL
EOSINOPHIL # BLD AUTO: 0.07 K/UL — SIGNIFICANT CHANGE UP (ref 0–0.5)
EOSINOPHIL NFR BLD AUTO: 0.8 % — SIGNIFICANT CHANGE UP (ref 0–6)
GLUCOSE SERPL-MCNC: 178 MG/DL
HCT VFR BLD CALC: 27 % — LOW (ref 39–50)
HGB BLD-MCNC: 8.9 G/DL — LOW (ref 13–17)
IMM GRANULOCYTES NFR BLD AUTO: 1.5 % — SIGNIFICANT CHANGE UP (ref 0–1.5)
LDH SERPL-CCNC: 264 U/L
LYMPHOCYTES # BLD AUTO: 2.21 K/UL — SIGNIFICANT CHANGE UP (ref 1–3.3)
LYMPHOCYTES # BLD AUTO: 26 % — SIGNIFICANT CHANGE UP (ref 13–44)
MAGNESIUM SERPL-MCNC: 1.9 MG/DL
MCHC RBC-ENTMCNC: 33 GM/DL — SIGNIFICANT CHANGE UP (ref 32–36)
MCHC RBC-ENTMCNC: 37.7 PG — HIGH (ref 27–34)
MCV RBC AUTO: 114.4 FL — HIGH (ref 80–100)
MONOCYTES # BLD AUTO: 0.47 K/UL — SIGNIFICANT CHANGE UP (ref 0–0.9)
MONOCYTES NFR BLD AUTO: 5.5 % — SIGNIFICANT CHANGE UP (ref 2–14)
NEUTROPHILS # BLD AUTO: 5.61 K/UL — SIGNIFICANT CHANGE UP (ref 1.8–7.4)
NEUTROPHILS NFR BLD AUTO: 66.1 % — SIGNIFICANT CHANGE UP (ref 43–77)
NRBC # BLD: 1 /100 WBCS — HIGH (ref 0–0)
PLATELET # BLD AUTO: 21 K/UL — LOW (ref 150–400)
POTASSIUM SERPL-SCNC: 4.7 MMOL/L
PROT SERPL-MCNC: 5.7 G/DL
RBC # BLD: 2.36 M/UL — LOW (ref 4.2–5.8)
RBC # FLD: 19 % — HIGH (ref 10.3–14.5)
SODIUM SERPL-SCNC: 142 MMOL/L
TACROLIMUS SERPL-MCNC: 5.9 NG/ML
WBC # BLD: 8.5 K/UL — SIGNIFICANT CHANGE UP (ref 3.8–10.5)
WBC # FLD AUTO: 8.5 K/UL — SIGNIFICANT CHANGE UP (ref 3.8–10.5)

## 2020-03-12 PROCEDURE — 71046 X-RAY EXAM CHEST 2 VIEWS: CPT | Mod: 26

## 2020-03-12 PROCEDURE — 99214 OFFICE O/P EST MOD 30 MIN: CPT

## 2020-03-12 PROCEDURE — 71046 X-RAY EXAM CHEST 2 VIEWS: CPT

## 2020-03-13 NOTE — HISTORY OF PRESENT ILLNESS
[de-identified] : Mr. Delong is a 58 y/o male, with a previously diagnosed acute myeloid leukemia. A bone marrow biopsy from 7/3 revealed Acute myeloid leukemia (AML). FISH - report shows a population of aberrant myeloid blasts. He is currently being treated by Dr. Coelho for high risk AML with HIDAC consolidation chemotherapy. He was referred by Dr. Coelho for an initial consultation of a Haplo- transplant. \par \par The patient has a history of multiple blood clots - factor V Leiden runs in the family. He was diagnosed with sleep apnea, but refuses to use CPAP. He is a former smoker, 40 years. He also has a past medical history of PE, HTN, cardiomyopathy. He recently underwent an amputation of the right first toe due to an infection.\par \par S/p hospitalization at [facility] 10/3/19 - 11/1/19 for haplo (son) SCT. \par Upon admission, a TLC was placed in IR. Mr. Delong received IV fluid hydration, pain management, nutritional support, anxiolysis, antiemetics, and antiviral,  antifungal, antibacterial, GI, PCP and VOD prophylaxis. When his ANC dropped  below 1000, he was started on prophylactic Cefepime. Labs were monitored on a daily basis and he received transfusional support and electrolyte repletion as needed. \par \par While in patient, Mr. Delong was continued on his Voriconazole for history of aspergillosis. \par \par S/p hospitalization at [facility] 10/3/19 - 11/1/19 for haplo (son) SCT. \par Upon admission, a TLC was placed in IR. Mr. Delong received IV fluid hydration, pain management, nutritional support, anxiolysis, antiemetics, and antiviral, antifungal, antibacterial, GI, PCP and VOD prophylaxis. When his ANC dropped below 1000, he was started on prophylactic Cefepime. Labs were monitored on a daily basis and he received transfusional support and electrolyte repletion as needed. \par \par While in patient, Mr. Delong was continued on his Voriconazole for history of aspergillosis. \par \par During admission, Mr. Delong had pancytopenia related to the high dose chemotherapy prep regimen. He also experienced neutropenic fevers. When he became febrile, blood and urine cultures were sent and a CXR was completed which were unremarkable. \par \par On 10/9/2019, following premedications, pt received 250 ml of allogeneic, related, haplo, fresh, mobilized HPC apheresis over 30-60 minutes. \par Cell counts as follows: \par \par Total MNCs (x10^8/kg) = 5.17 \par CD 34 cells (x10^6/kg) = 7.34 \par CD 3 Cells (x 10^7/kg) = 19.81 \par Cell viability (%) = 100 \par \par Pt tolerated infusion without any adverse reaction or complications. \par \par Engraftment was noted on 10/28/2019. Daily Zarxio was discontinued, as was Cefepime given negative cultures. Mr. Delong was discharged home to f/u at the [facility].  [de-identified] : On 3/12/20 visit, day +155 of SCT today. Ongoing SOB with exertion is stable compared to last week. Pruritus has resolved but still with rash on neck and chest. Otherwise feeling well with good PO intake/hydration. Compliant with post transplant meds and restrictions. Currently taking FK 0.5mg daily which he did not take prior to lab draw today, and prednisone 15mg daily. Denies fever, chills, headache, dizziness, blurred vision, mucositis/odynophagia, chest pain, cough, nausea/vomiting, diarrhea/constipation, abdominal pain, dysuria, LE edema, bleeding, pain

## 2020-03-13 NOTE — REVIEW OF SYSTEMS
[Fever] : no fever [Fatigue] : fatigue [Eye Pain] : no eye pain [Dry Eyes] : no dryness of the eyes [Vision Problems] : no vision problems [Dysphagia] : no dysphagia [Nosebleeds] : no nosebleeds [Odynophagia] : no odynophagia [Mucosal Pain] : no mucosal pain [Chest Pain] : no chest pain [Lower Ext Edema] : no lower extremity edema [Cough] : no cough [SOB on Exertion] : shortness of breath during exertion [Abdominal Pain] : no abdominal pain [Vomiting] : no vomiting [Constipation] : no constipation [Diarrhea] : no diarrhea [Dysuria] : no dysuria [Joint Pain] : no joint pain [Muscle Pain] : no muscle pain [Skin Rash] : skin rash [Dizziness] : no dizziness [Fainting] : no fainting [Anxiety] : anxiety [Negative] : Heme/Lymph [de-identified] : rash on neck and chest [de-identified] : manageable with Xanax

## 2020-03-13 NOTE — ASSESSMENT
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\par 12/4/19 chimerism: 100% donor\par 12/24/19 chimerism = 100% donor (CD33 = 100% and CD3 = 99.3% donor)\par 1/8/20 chimerism = 100% donor\par 2/5/20 chimerism = 100% donor\par 2/21/20 chimerism = 100% donor\par 3/3/20 chimerism = 100% donor\par Post transplant BMBx pending \par \par 2) Heme\par ABO compatible transplant: pt and donor are both A pos\par Ongoing anemia and thrombocytopenia poss 2/2 Valcyte, d/c'd as of 2/14/20\par No indication for transfusion today\par    3/12/20:   WBC 8.5   ANC 5.61   Hgb 8.9   PLT 21\par Scheduled for poss PLT on 3/19 after provider visit \par Continue multivitamin and folic acid daily\par \par Hx of recurrent DVT/PE\par Factor V Leiden reportedly runs in the family \par 11/21/19 Doppler US of RUE negative for DVT\par Xarelto on hold while pancytopenic, to resume when PLT consistently >100K\par Consider Doppler US of LEs if edema persists \par \par 3) ID\par Continue ppx:\par - Acyclovir 400mg bid resumed 2/26/20\par - Mepron 750mg bid\par - Voriconazole 200mg bid - hx of aspergillosis \par Post transplant crowd and dietary restrictions reviewed\par \par 3/12/20 CXR with improving opacities in setting of recent PNA, continue to monitor \par CMV viremia\par 11/7/19 CMV PCR = 247\par 11/14/19 CMV PCR = 2,205\par 11/20/19 CMV PCR = 8,581\par 11/26/19 CMV PCR = 4103\par 12/4/19 CMV PCR = 215\par CMV PCR not detected as of 12/10/19\par Valcyte 450mg BID started 11/21/19. Decreased to QD as of 12/17/19. Held as of 2/14/20 for ongoing thrombocytopenia \par Continue to monitor PCR weekly \par \par 4) GVHD\par Skin 1   Liver 0   GI 0 - overall grade 1\par No signs of chronic GVHD at this time\par Off MMF as of 11/22/19 \par Continue FK 0.5mg daily - pending today's level\par Continue prednisone 15mg daily, initially started for FTT but developed GVHD upon tapering \par Reviewed signs/symptoms of GVHD (i.e. rash, mucositis, nausea/vomiting, diarrhea)\par \par aGVHD of skin \par Maculopapular pruritic rash of face, neck, and upper chest (BSA 19%) noted 1/21/20 in setting of tapering prednisone (for FTT) - max skin stage 1, overall grade 1\par 1/21/20: rash on face, neck, and chest (BSA 19%); skin 1, overall grade 1; prednisone increased to 10mg daily\par 2/14/20: rash on neck, chest, and bilateral upper arms (BSA 18%); skin 1, overall grade 1: prednisone increased to 20mg daily\par 2/21/20: rash on neck and chest (10% BSA); skin 1, overall grade 1. Continue prednisone 20mg daily\par 3/12/20: rash on neck and chest (10% BSA); skin 1, overall grade 1. Continue prednisone 15mg daily  \par Continue triamcinolone 0.1% cream bid\par Continue to monitor closely \par \par 5) GI\par Continue ppx:\par - Protonix 40 mg daily\par - Ursodiol 300 mg bid\par PRN Zofran and Reglan for nausea/vomiting\par \par 6) Failure to thrive\par Poor PO intake/hydration with resulting hypotension\par Prednisone 20 mg daily started 11/20/19 with resolution of symptoms\par RESOLVED\par \par 7) Cardiac\par HTN - continue metoprolol succinate 12.5mg bid. Continue to hold enalapril 2.5mg daily in light of recent hypotension\par Cardiomyopathy - continue Lasix 20mg daily for suspected fluid retention causing SOB\par F/u with cardiologist and repeat ECHO on 3/30/20 \par \par 8) Other\par BPH - continue finasteride 5mg daily and tamsulosin 0.4mg daily \par Hypomagnesemia - likely 2/2 FK, continue MgOx 400mg tid\par Anxiety/Depression - continue Zoloft 50mg daily and PRN Xanax 0.5mg qhs\par \par 9) Plan/Dispo\par Pt educated regarding plan of care, all questions/concerns addressed\par Continue weekly appts at this time\par F/u with NP on 3/19/20

## 2020-03-13 NOTE — PHYSICAL EXAM
[Ambulatory and capable of all self care but unable to carry out any work activities] : Status 2- Ambulatory and capable of all self care but unable to carry out any work activities. Up and about more than 50% of waking hours [Normal] : affect appropriate [de-identified] : tachycardic, regular rhythm, normal s1s2 [de-identified] : LLE>RLE [de-identified] : +BS; abdomen soft, non-distended, non-tender [de-identified] : s/p right first toe amputation.  [de-identified] : erythematous rash of neck and chest

## 2020-03-14 LAB
CMV DNA SPEC QL NAA+PROBE: ABNORMAL IU/ML
CMVPCR LOG: ABNORMAL LOG10IU/ML

## 2020-03-18 ENCOUNTER — OUTPATIENT (OUTPATIENT)
Dept: OUTPATIENT SERVICES | Facility: HOSPITAL | Age: 60
LOS: 1 days | End: 2020-03-18
Payer: COMMERCIAL

## 2020-03-18 DIAGNOSIS — Z01.812 ENCOUNTER FOR PREPROCEDURAL LABORATORY EXAMINATION: ICD-10-CM

## 2020-03-19 ENCOUNTER — RESULT REVIEW (OUTPATIENT)
Age: 60
End: 2020-03-19

## 2020-03-19 ENCOUNTER — APPOINTMENT (OUTPATIENT)
Dept: INFUSION THERAPY | Facility: HOSPITAL | Age: 60
End: 2020-03-19

## 2020-03-19 ENCOUNTER — APPOINTMENT (OUTPATIENT)
Dept: HEMATOLOGY ONCOLOGY | Facility: CLINIC | Age: 60
End: 2020-03-19
Payer: COMMERCIAL

## 2020-03-19 VITALS
WEIGHT: 204.13 LBS | DIASTOLIC BLOOD PRESSURE: 69 MMHG | RESPIRATION RATE: 19 BRPM | HEART RATE: 86 BPM | SYSTOLIC BLOOD PRESSURE: 105 MMHG | BODY MASS INDEX: 27.69 KG/M2 | TEMPERATURE: 98.3 F | OXYGEN SATURATION: 94 %

## 2020-03-19 LAB
ALBUMIN SERPL ELPH-MCNC: 4.4 G/DL
ALP BLD-CCNC: 71 U/L
ALT SERPL-CCNC: 14 U/L
ANION GAP SERPL CALC-SCNC: 13 MMOL/L
AST SERPL-CCNC: 17 U/L
BASOPHILS # BLD AUTO: 0.02 K/UL — SIGNIFICANT CHANGE UP (ref 0–0.2)
BASOPHILS NFR BLD AUTO: 0.2 % — SIGNIFICANT CHANGE UP (ref 0–2)
BILIRUB SERPL-MCNC: 0.4 MG/DL
BUN SERPL-MCNC: 27 MG/DL
CALCIUM SERPL-MCNC: 9.3 MG/DL
CHLORIDE SERPL-SCNC: 102 MMOL/L
CO2 SERPL-SCNC: 23 MMOL/L
CREAT SERPL-MCNC: 1.19 MG/DL
EOSINOPHIL # BLD AUTO: 0.11 K/UL — SIGNIFICANT CHANGE UP (ref 0–0.5)
EOSINOPHIL NFR BLD AUTO: 0.8 % — SIGNIFICANT CHANGE UP (ref 0–6)
GLUCOSE SERPL-MCNC: 154 MG/DL
HCT VFR BLD CALC: 26.5 % — LOW (ref 39–50)
HGB BLD-MCNC: 8.8 G/DL — LOW (ref 13–17)
IMM GRANULOCYTES NFR BLD AUTO: 0.7 % — SIGNIFICANT CHANGE UP (ref 0–1.5)
LDH SERPL-CCNC: 276 U/L
LYMPHOCYTES # BLD AUTO: 3.95 K/UL — HIGH (ref 1–3.3)
LYMPHOCYTES # BLD AUTO: 30.4 % — SIGNIFICANT CHANGE UP (ref 13–44)
MAGNESIUM SERPL-MCNC: 1.8 MG/DL
MCHC RBC-ENTMCNC: 33.2 GM/DL — SIGNIFICANT CHANGE UP (ref 32–36)
MCHC RBC-ENTMCNC: 38.3 PG — HIGH (ref 27–34)
MCV RBC AUTO: 115.2 FL — HIGH (ref 80–100)
MONOCYTES # BLD AUTO: 1.06 K/UL — HIGH (ref 0–0.9)
MONOCYTES NFR BLD AUTO: 8.2 % — SIGNIFICANT CHANGE UP (ref 2–14)
NEUTROPHILS # BLD AUTO: 7.77 K/UL — HIGH (ref 1.8–7.4)
NEUTROPHILS NFR BLD AUTO: 59.7 % — SIGNIFICANT CHANGE UP (ref 43–77)
NRBC # BLD: 0 /100 WBCS — SIGNIFICANT CHANGE UP (ref 0–0)
PLATELET # BLD AUTO: 26 K/UL — LOW (ref 150–400)
POTASSIUM SERPL-SCNC: 4.8 MMOL/L
PROT SERPL-MCNC: 5.9 G/DL
RBC # BLD: 2.3 M/UL — LOW (ref 4.2–5.8)
RBC # FLD: 19.9 % — HIGH (ref 10.3–14.5)
SODIUM SERPL-SCNC: 139 MMOL/L
WBC # BLD: 13 K/UL — HIGH (ref 3.8–10.5)
WBC # FLD AUTO: 13 K/UL — HIGH (ref 3.8–10.5)

## 2020-03-19 PROCEDURE — 99215 OFFICE O/P EST HI 40 MIN: CPT

## 2020-03-20 ENCOUNTER — OUTPATIENT (OUTPATIENT)
Dept: OUTPATIENT SERVICES | Facility: HOSPITAL | Age: 60
LOS: 1 days | Discharge: HOME | End: 2020-03-20

## 2020-03-20 DIAGNOSIS — C92.00 ACUTE MYELOBLASTIC LEUKEMIA, NOT HAVING ACHIEVED REMISSION: ICD-10-CM

## 2020-03-20 LAB — TACROLIMUS SERPL-MCNC: 4.6 NG/ML

## 2020-03-20 NOTE — PHYSICAL EXAM
[Ambulatory and capable of all self care but unable to carry out any work activities] : Status 2- Ambulatory and capable of all self care but unable to carry out any work activities. Up and about more than 50% of waking hours [Normal] : affect appropriate [de-identified] :  regular rhythm, normal s1s2 [de-identified] : LLE>RLE [de-identified] : +BS; abdomen soft, non-distended, non-tender [de-identified] : s/p right first toe amputation.  [de-identified] : erythematous rash of neck and chest

## 2020-03-20 NOTE — HISTORY OF PRESENT ILLNESS
[de-identified] : Mr. Delong is a 60 y/o male, with a previously diagnosed acute myeloid leukemia. A bone marrow biopsy from 7/3 revealed Acute myeloid leukemia (AML). FISH - report shows a population of aberrant myeloid blasts. He is currently being treated by Dr. Coelho for high risk AML with HIDAC consolidation chemotherapy. He was referred by Dr. Coelho for an initial consultation of a Haplo- transplant. \par \par The patient has a history of multiple blood clots - factor V Leiden runs in the family. He was diagnosed with sleep apnea, but refuses to use CPAP. He is a former smoker, 40 years. He also has a past medical history of PE, HTN, cardiomyopathy. He recently underwent an amputation of the right first toe due to an infection.\par \par S/p hospitalization at [facility] 10/3/19 - 11/1/19 for haplo (son) SCT. \par Upon admission, a TLC was placed in IR. Mr. Delong received IV fluid hydration, pain management, nutritional support, anxiolysis, antiemetics, and antiviral,  antifungal, antibacterial, GI, PCP and VOD prophylaxis. When his ANC dropped  below 1000, he was started on prophylactic Cefepime. Labs were monitored on a daily basis and he received transfusional support and electrolyte repletion as needed. \par \par While in patient, Mr. Delong was continued on his Voriconazole for history of aspergillosis. \par \par S/p hospitalization at [facility] 10/3/19 - 11/1/19 for haplo (son) SCT. \par Upon admission, a TLC was placed in IR. Mr. Delong received IV fluid hydration, pain management, nutritional support, anxiolysis, antiemetics, and antiviral, antifungal, antibacterial, GI, PCP and VOD prophylaxis. When his ANC dropped below 1000, he was started on prophylactic Cefepime. Labs were monitored on a daily basis and he received transfusional support and electrolyte repletion as needed. \par \par While in patient, Mr. Delong was continued on his Voriconazole for history of aspergillosis. \par \par During admission, Mr. Delong had pancytopenia related to the high dose chemotherapy prep regimen. He also experienced neutropenic fevers. When he became febrile, blood and urine cultures were sent and a CXR was completed which were unremarkable. \par \par On 10/9/2019, following premedications, pt received 250 ml of allogeneic, related, haplo, fresh, mobilized HPC apheresis over 30-60 minutes. \par Cell counts as follows: \par \par Total MNCs (x10^8/kg) = 5.17 \par CD 34 cells (x10^6/kg) = 7.34 \par CD 3 Cells (x 10^7/kg) = 19.81 \par Cell viability (%) = 100 \par \par Pt tolerated infusion without any adverse reaction or complications. \par \par Engraftment was noted on 10/28/2019. Daily Zarxio was discontinued, as was Cefepime given negative cultures. Mr. Delong was discharged home to f/u at the [facility].  [de-identified] : On 3/12/20 visit, day +155 of SCT today. Ongoing SOB with exertion is stable compared to last week. Pruritus has resolved but still with rash on neck and chest. Otherwise feeling well with good PO intake/hydration. Compliant with post transplant meds and restrictions. Currently taking FK 0.5mg daily which he did not take prior to lab draw today, and prednisone 15mg daily. Denies fever, chills, headache, dizziness, blurred vision, mucositis/odynophagia, chest pain, cough, nausea/vomiting, diarrhea/constipation, abdominal pain, dysuria, LE edema, bleeding, pain \par \par On 3/19/20 visit, day + 161 of SCT today. Ongoing shortness of breath with exertion is stable compared to last week. Mild erythematous rash of chest and neck. Overall feeling well. On FK 0.5 mg daily, prednisone 15 mg daily and lasix 20 mg daily. Mild edema of left lower extremity. Denies fever, chills, nausea, vomiting, diarrhea, mouth sores, dysuria or any signs of active bleeding. Denies chest pain. Remains compliant with post transplant diet and crowd restrictions. Remains compliant with post transplant medications.

## 2020-03-20 NOTE — REVIEW OF SYSTEMS
[Fatigue] : fatigue [SOB on Exertion] : shortness of breath during exertion [Skin Rash] : skin rash [Anxiety] : anxiety [Negative] : Heme/Lymph [Fever] : no fever [Chills] : no chills [Night Sweats] : no night sweats [Eye Pain] : no eye pain [Dry Eyes] : no dryness of the eyes [Vision Problems] : no vision problems [Dysphagia] : no dysphagia [Nosebleeds] : no nosebleeds [Odynophagia] : no odynophagia [Mucosal Pain] : no mucosal pain [Chest Pain] : no chest pain [Lower Ext Edema] : lower extremity edema [Wheezing] : no wheezing [Cough] : no cough [Abdominal Pain] : no abdominal pain [Vomiting] : no vomiting [Constipation] : no constipation [Diarrhea] : no diarrhea [Dysuria] : no dysuria [Joint Pain] : no joint pain [Muscle Pain] : no muscle pain [Dizziness] : no dizziness [Fainting] : no fainting [FreeTextEntry5] : left lower extremity edema [FreeTextEntry6] : dyspnea on exertion [de-identified] : rash on neck and chest [de-identified] : manageable with Xanax

## 2020-03-20 NOTE — ASSESSMENT
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\par 12/4/19 chimerism: 100% donor\par 12/24/19 chimerism = 100% donor (CD33 = 100% and CD3 = 99.3% donor)\par 1/8/20 chimerism = 100% donor\par 2/5/20 chimerism = 100% donor\par 2/21/20 chimerism = 100% donor\par 3/3/20 chimerism = 100% donor\par Post transplant BMBx pending \par \par 2) Heme\par ABO compatible transplant: pt and donor are both A pos\par Ongoing anemia and thrombocytopenia poss 2/2 Valcyte, d/c'd as of 2/14/20\par No indication for transfusion today\par    3/19/20:   WBC 13   ANC 7.77  Hgb 8.8   PLT 26\par Scheduled for poss PLT on 3/26/20 before provider appointment\par Continue multivitamin and folic acid daily\par \par Hx of recurrent DVT/PE\par Factor V Leiden reportedly runs in the family \par 11/21/19 Doppler US of RUE negative for DVT\par Xarelto on hold while pancytopenic, to resume when PLT consistently >100K\par Consider Doppler US of LEs if edema persists \par \par 3) ID\par Continue ppx:\par - Acyclovir 400mg bid resumed 2/26/20\par - Mepron 750mg bid\par - Voriconazole 200mg bid - hx of aspergillosis \par Post transplant crowd and dietary restrictions reviewed\par \par 3/12/20 CXR with improving opacities in setting of recent PNA, continue to monitor \par CMV viremia\par 11/7/19 CMV PCR = 247\par 11/14/19 CMV PCR = 2,205\par 11/20/19 CMV PCR = 8,581\par 11/26/19 CMV PCR = 4103\par 12/4/19 CMV PCR = 215\par 3/12/20 CMV PCR  less than 50.\par Valcyte 450mg BID started 11/21/19. Decreased to QD as of 12/17/19. Held as of 2/14/20 for ongoing thrombocytopenia \par Continue to monitor PCR weekly \par \par 4) GVHD\par Skin 1   Liver 0   GI 0 - overall grade 1\par No signs of chronic GVHD at this time\par Off MMF as of 11/22/19 \par Continue FK 0.5 mg daily - pending today's level\par Continue prednisone 15 mg daily, initially started for FTT but developed GVHD upon tapering \par Reviewed signs/symptoms of GVHD (i.e. rash, mucositis, nausea/vomiting, diarrhea)\par \par aGVHD of skin \par Maculopapular pruritic rash of face, neck, and upper chest (BSA 19%) noted 1/21/20 in setting of tapering prednisone (for FTT) - max skin stage 1, overall grade 1\par 1/21/20: rash on face, neck, and chest (BSA 19%); skin 1, overall grade 1; prednisone increased to 10mg daily\par 2/14/20: rash on neck, chest, and bilateral upper arms (BSA 18%); skin 1, overall grade 1: prednisone increased to 20mg daily\par 2/21/20: rash on neck and chest (10% BSA); skin 1, overall grade 1. Continue prednisone 20mg daily\par 3/12/20: rash on neck and chest (10% BSA); skin 1, overall grade 1. Continue prednisone 15mg daily  \par 3/19/20: rash on neck and chest (10% BSA); skin 1, overall grade 1. Continue prednisone 15 mg daily\par Continue triamcinolone 0.1% cream bid\par Continue to monitor closely \par \par 5) GI\par Continue ppx:\par - Protonix 40 mg daily\par - Ursodiol 300 mg bid\par PRN Zofran and Reglan for nausea/vomiting\par \par 6) Failure to thrive\par Poor PO intake/hydration with resulting hypotension\par Prednisone 20 mg daily started 11/20/19 with resolution of symptoms\par RESOLVED\par \par 7) Cardiac\par HTN - continue metoprolol succinate 12.5mg bid. Continue to hold enalapril 2.5mg daily in light of recent hypotension\par Cardiomyopathy - continue Lasix 20mg daily for suspected fluid retention causing SOB\par F/u with cardiologist and repeat ECHO on 3/30/20 \par \par 8) Other\par BPH - continue finasteride 5mg daily and tamsulosin 0.4mg daily \par Hypomagnesemia - likely 2/2 FK, continue MgOx 400mg tid\par Anxiety/Depression - continue Zoloft 50mg daily and PRN Xanax 0.5mg qhs\par \par 9) Plan/Dispo\par Pt educated regarding plan of care, all questions/concerns addressed\par Medications sent to local pharmacy today\par Continue weekly appts at this time\par F/u with MD Quiñones on 3/26/20. Has a possible platelet appointment on 3/26/20.

## 2020-03-23 ENCOUNTER — RX RENEWAL (OUTPATIENT)
Age: 60
End: 2020-03-23

## 2020-03-23 LAB
CMV DNA SPEC QL NAA+PROBE: NOT DETECTED
CMVPCR LOG: NOT DETECTED LOG10IU/ML

## 2020-03-25 LAB
CD3 AND CD33 ENRICHMENT: NORMAL
ENGRAFTMNET-POST: NORMAL

## 2020-03-26 ENCOUNTER — RESULT REVIEW (OUTPATIENT)
Age: 60
End: 2020-03-26

## 2020-03-26 ENCOUNTER — APPOINTMENT (OUTPATIENT)
Dept: INFUSION THERAPY | Facility: HOSPITAL | Age: 60
End: 2020-03-26

## 2020-03-26 ENCOUNTER — APPOINTMENT (OUTPATIENT)
Dept: HEMATOLOGY ONCOLOGY | Facility: CLINIC | Age: 60
End: 2020-03-26

## 2020-03-26 ENCOUNTER — APPOINTMENT (OUTPATIENT)
Dept: HEMATOLOGY ONCOLOGY | Facility: CLINIC | Age: 60
End: 2020-03-26
Payer: COMMERCIAL

## 2020-03-26 VITALS
WEIGHT: 203.46 LBS | RESPIRATION RATE: 20 BRPM | BODY MASS INDEX: 27.6 KG/M2 | SYSTOLIC BLOOD PRESSURE: 128 MMHG | TEMPERATURE: 98.3 F | OXYGEN SATURATION: 96 % | HEART RATE: 95 BPM | DIASTOLIC BLOOD PRESSURE: 64 MMHG

## 2020-03-26 LAB
BASOPHILS # BLD AUTO: 0.01 K/UL — SIGNIFICANT CHANGE UP (ref 0–0.2)
BASOPHILS NFR BLD AUTO: 0.1 % — SIGNIFICANT CHANGE UP (ref 0–2)
EOSINOPHIL # BLD AUTO: 0.02 K/UL — SIGNIFICANT CHANGE UP (ref 0–0.5)
EOSINOPHIL NFR BLD AUTO: 0.2 % — SIGNIFICANT CHANGE UP (ref 0–6)
HCT VFR BLD CALC: 26.3 % — LOW (ref 39–50)
HGB BLD-MCNC: 8.3 G/DL — LOW (ref 13–17)
IMM GRANULOCYTES NFR BLD AUTO: 1.1 % — SIGNIFICANT CHANGE UP (ref 0–1.5)
LYMPHOCYTES # BLD AUTO: 4.6 K/UL — HIGH (ref 1–3.3)
LYMPHOCYTES # BLD AUTO: 40.3 % — SIGNIFICANT CHANGE UP (ref 13–44)
MCHC RBC-ENTMCNC: 31.6 GM/DL — LOW (ref 32–36)
MCHC RBC-ENTMCNC: 37.7 PG — HIGH (ref 27–34)
MCV RBC AUTO: 119.5 FL — HIGH (ref 80–100)
MONOCYTES # BLD AUTO: 0.54 K/UL — SIGNIFICANT CHANGE UP (ref 0–0.9)
MONOCYTES NFR BLD AUTO: 4.7 % — SIGNIFICANT CHANGE UP (ref 2–14)
NEUTROPHILS # BLD AUTO: 6.13 K/UL — SIGNIFICANT CHANGE UP (ref 1.8–7.4)
NEUTROPHILS NFR BLD AUTO: 53.6 % — SIGNIFICANT CHANGE UP (ref 43–77)
NRBC # BLD: 2 /100 WBCS — HIGH (ref 0–0)
PLATELET # BLD AUTO: 32 K/UL — LOW (ref 150–400)
RBC # BLD: 2.2 M/UL — LOW (ref 4.2–5.8)
RBC # FLD: 20.2 % — HIGH (ref 10.3–14.5)
WBC # BLD: 11.42 K/UL — HIGH (ref 3.8–10.5)
WBC # FLD AUTO: 11.42 K/UL — HIGH (ref 3.8–10.5)

## 2020-03-26 PROCEDURE — 99214 OFFICE O/P EST MOD 30 MIN: CPT

## 2020-03-27 LAB
ALBUMIN SERPL ELPH-MCNC: 4.2 G/DL
ALP BLD-CCNC: 79 U/L
ALT SERPL-CCNC: 10 U/L
ANION GAP SERPL CALC-SCNC: 13 MMOL/L
AST SERPL-CCNC: 16 U/L
BILIRUB SERPL-MCNC: 0.3 MG/DL
BUN SERPL-MCNC: 19 MG/DL
CALCIUM SERPL-MCNC: 9.5 MG/DL
CD3 AND CD33 ENRICHMENT: NORMAL
CHLORIDE SERPL-SCNC: 103 MMOL/L
CO2 SERPL-SCNC: 22 MMOL/L
CREAT SERPL-MCNC: 1.09 MG/DL
ENGRAFTMNET-POST: NORMAL
GLUCOSE SERPL-MCNC: 163 MG/DL
LDH SERPL-CCNC: 237 U/L
MAGNESIUM SERPL-MCNC: 1.7 MG/DL
POTASSIUM SERPL-SCNC: 5 MMOL/L
PROT SERPL-MCNC: 5.8 G/DL
SODIUM SERPL-SCNC: 139 MMOL/L
TACROLIMUS SERPL-MCNC: 4.8 NG/ML

## 2020-03-30 LAB
CMV DNA SPEC QL NAA+PROBE: NOT DETECTED
CMVPCR LOG: NOT DETECTED LOG10IU/ML

## 2020-04-02 ENCOUNTER — RESULT REVIEW (OUTPATIENT)
Age: 60
End: 2020-04-02

## 2020-04-02 ENCOUNTER — APPOINTMENT (OUTPATIENT)
Dept: HEMATOLOGY ONCOLOGY | Facility: CLINIC | Age: 60
End: 2020-04-02
Payer: COMMERCIAL

## 2020-04-02 VITALS
RESPIRATION RATE: 18 BRPM | HEART RATE: 114 BPM | TEMPERATURE: 98.1 F | SYSTOLIC BLOOD PRESSURE: 117 MMHG | OXYGEN SATURATION: 94 % | BODY MASS INDEX: 27.12 KG/M2 | WEIGHT: 199.96 LBS | DIASTOLIC BLOOD PRESSURE: 64 MMHG

## 2020-04-02 LAB
ANISOCYTOSIS BLD QL: SLIGHT — SIGNIFICANT CHANGE UP
BASOPHILS # BLD AUTO: 0 K/UL — SIGNIFICANT CHANGE UP (ref 0–0.2)
BASOPHILS NFR BLD AUTO: 0 % — SIGNIFICANT CHANGE UP (ref 0–2)
BLD GP AB SCN SERPL QL: NEGATIVE — SIGNIFICANT CHANGE UP
DACRYOCYTES BLD QL SMEAR: SLIGHT — SIGNIFICANT CHANGE UP
ELLIPTOCYTES BLD QL SMEAR: SLIGHT — SIGNIFICANT CHANGE UP
ENGRAFTMNET-POST: NORMAL
EOSINOPHIL # BLD AUTO: 0.32 K/UL — SIGNIFICANT CHANGE UP (ref 0–0.5)
EOSINOPHIL NFR BLD AUTO: 4 % — SIGNIFICANT CHANGE UP (ref 0–6)
HCT VFR BLD CALC: 27.1 % — LOW (ref 39–50)
HGB BLD-MCNC: 8.4 G/DL — LOW (ref 13–17)
LYMPHOCYTES # BLD AUTO: 1.86 K/UL — SIGNIFICANT CHANGE UP (ref 1–3.3)
LYMPHOCYTES # BLD AUTO: 23 % — SIGNIFICANT CHANGE UP (ref 13–44)
MACROCYTES BLD QL: SLIGHT — SIGNIFICANT CHANGE UP
MCHC RBC-ENTMCNC: 31 GM/DL — LOW (ref 32–36)
MCHC RBC-ENTMCNC: 37.7 PG — HIGH (ref 27–34)
MCV RBC AUTO: 121.5 FL — HIGH (ref 80–100)
MONOCYTES # BLD AUTO: 0.65 K/UL — SIGNIFICANT CHANGE UP (ref 0–0.9)
MONOCYTES NFR BLD AUTO: 8 % — SIGNIFICANT CHANGE UP (ref 2–14)
NEUTROPHILS # BLD AUTO: 5.27 K/UL — SIGNIFICANT CHANGE UP (ref 1.8–7.4)
NEUTROPHILS NFR BLD AUTO: 64 % — SIGNIFICANT CHANGE UP (ref 43–77)
NEUTS BAND # BLD: 1 % — SIGNIFICANT CHANGE UP (ref 0–8)
NRBC # BLD: 5 /100 — HIGH (ref 0–0)
NRBC # BLD: SIGNIFICANT CHANGE UP /100 WBCS (ref 0–0)
PLAT MORPH BLD: NORMAL — SIGNIFICANT CHANGE UP
PLATELET # BLD AUTO: 28 K/UL — LOW (ref 150–400)
RBC # BLD: 2.23 M/UL — LOW (ref 4.2–5.8)
RBC # FLD: 20.4 % — HIGH (ref 10.3–14.5)
RBC BLD AUTO: ABNORMAL
RH IG SCN BLD-IMP: POSITIVE — SIGNIFICANT CHANGE UP
WBC # BLD: 8.1 K/UL — SIGNIFICANT CHANGE UP (ref 3.8–10.5)
WBC # FLD AUTO: 8.1 K/UL — SIGNIFICANT CHANGE UP (ref 3.8–10.5)

## 2020-04-02 PROCEDURE — 99215 OFFICE O/P EST HI 40 MIN: CPT

## 2020-04-03 LAB
ALBUMIN SERPL ELPH-MCNC: 4.1 G/DL
ALP BLD-CCNC: 84 U/L
ALT SERPL-CCNC: 12 U/L
ANION GAP SERPL CALC-SCNC: 16 MMOL/L
AST SERPL-CCNC: 17 U/L
BILIRUB SERPL-MCNC: 0.5 MG/DL
BUN SERPL-MCNC: 23 MG/DL
CALCIUM SERPL-MCNC: 9 MG/DL
CHLORIDE SERPL-SCNC: 100 MMOL/L
CO2 SERPL-SCNC: 22 MMOL/L
CREAT SERPL-MCNC: 1.14 MG/DL
GLUCOSE SERPL-MCNC: 199 MG/DL
LDH SERPL-CCNC: 276 U/L
MAGNESIUM SERPL-MCNC: 1.8 MG/DL
POTASSIUM SERPL-SCNC: 4.6 MMOL/L
PROT SERPL-MCNC: 5.8 G/DL
SODIUM SERPL-SCNC: 138 MMOL/L
TACROLIMUS SERPL-MCNC: 4.5 NG/ML

## 2020-04-04 NOTE — PHYSICAL EXAM
[Ambulatory and capable of all self care but unable to carry out any work activities] : Status 2- Ambulatory and capable of all self care but unable to carry out any work activities. Up and about more than 50% of waking hours [Normal] : affect appropriate [de-identified] :  regular rhythm, normal s1s2 [de-identified] : LLE>RLE [de-identified] : +BS; abdomen soft, non-distended, non-tender [de-identified] : s/p right first toe amputation.  [de-identified] : erythematous rash of neck and chest

## 2020-04-04 NOTE — ASSESSMENT
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 with the following comorbidities being managed:\par \par 1) AML\par S/p haplo (son) PSCT on 10/9/19\par 12/4/19 chimerism: 100% donor\par 12/24/19 chimerism = 100% donor (CD33 = 100% and CD3 = 99.3% donor)\par 1/8/20 chimerism = 100% donor\par 2/5/20 chimerism = 100% donor\par 2/21/20 chimerism = 100% donor\par 3/3/20 chimerism = 100% \par 3/19/20 chimerism= 100% donor\par Post transplant BMBx pending \par \par 2) Heme\par ABO compatible transplant: pt and donor are both A pos\par Ongoing anemia and thrombocytopenia poss 2/2 Valcyte, d/c'd as of 2/14/20\par No indication for transfusion today\par    4/2/20:   WBC 8.1   ANC 5.2  Hgb 8.4   PLT 28\par Continue multivitamin and folic acid daily\par \par Hx of recurrent DVT/PE\par Factor V Leiden reportedly runs in the family \par 11/21/19 Doppler US of RUE negative for DVT\par Xarelto on hold while pancytopenic, to resume when PLT consistently >100K\par Consider Doppler US of LEs if edema persists \par \par 3) ID\par Continue ppx:\par - Acyclovir 400mg bid resumed 2/26/20\par - Mepron 750mg bid\par - Voriconazole 200mg bid - hx of aspergillosis \par Post transplant crowd and dietary restrictions reviewed\par \par 3/12/20 CXR with improving opacities in setting of recent PNA, continue to monitor \par \par CMV viremia\par 11/7/19 CMV PCR = 247\par 11/14/19 CMV PCR = 2,205\par 11/20/19 CMV PCR = 8,581\par 11/26/19 CMV PCR = 4103\par 12/4/19 CMV PCR = 215\par 3/12/20 CMV PCR  less than 50.\par 3/26/20 CMV PCR negative\par Valcyte 450mg BID started 11/21/19. Decreased to QD as of 12/17/19. Held as of 2/14/20 for ongoing thrombocytopenia \par Continue to monitor PCR weekly \par \par 4) GVHD\par Skin 1   Liver 0   GI 0 - overall grade 1\par No signs of chronic GVHD at this time\par Off MMF as of 11/22/19 \par Continue FK 0.5 mg daily - pending today's level\par Continue prednisone 15 mg daily, initially started for FTT but developed GVHD upon tapering \par Reviewed signs/symptoms of GVHD (i.e. rash, mucositis, nausea/vomiting, diarrhea)\par \par aGVHD of skin \par Maculopapular pruritic rash of face, neck, and upper chest (BSA 19%) noted 1/21/20 in setting of tapering prednisone (for FTT) - max skin stage 1, overall grade 1\par 1/21/20: rash on face, neck, and chest (BSA 19%); skin 1, overall grade 1; prednisone increased to 10mg daily\par 2/14/20: rash on neck, chest, and bilateral upper arms (BSA 18%); skin 1, overall grade 1: prednisone increased to 20mg daily\par 2/21/20: rash on neck and chest (10% BSA); skin 1, overall grade 1. Continue prednisone 20mg daily\par 3/12/20: rash on neck and chest (10% BSA); skin 1, overall grade 1. Continue prednisone 15mg daily  \par 3/19/20: rash on neck and chest (10% BSA); skin 1, overall grade 1. Continue prednisone 15 mg daily\par 4/2/20: rash on neck and chest (10% BSA); skin 1, overall grade 1. Continue prednisone 15 mg daily\par Continue triamcinolone 0.1% cream bid\par Continue to monitor closely \par \par 5) GI\par Continue ppx:\par - Protonix 40 mg daily\par - Ursodiol 300 mg bid\par PRN Zofran and Reglan for nausea/vomiting\par \par 6) Failure to thrive\par Poor PO intake/hydration with resulting hypotension\par Prednisone 20 mg daily started 11/20/19 with resolution of symptoms\par RESOLVED\par \par 7) Cardiac\par HTN - continue metoprolol succinate 12.5mg bid. Continue to hold enalapril 2.5mg daily in light of recent hypotension\par Cardiomyopathy - continue Lasix 20mg daily for suspected fluid retention causing SOB\par \par 8) Other\par BPH - continue finasteride 5mg daily and tamsulosin 0.4mg daily \par Hypomagnesemia - likely 2/2 FK, continue MgOx 400mg tid\par Anxiety/Depression - continue Zoloft 50mg daily and PRN Xanax 0.5mg qhs\par \par 9) Plan/Dispo\par Pt educated regarding plan of care, all questions/concerns addressed\par Continue weekly appts at this time\par F/u with MD Gaytan on 4/9/20

## 2020-04-04 NOTE — HISTORY OF PRESENT ILLNESS
[de-identified] : Mr. Delong is a 60 y/o male, with a previously diagnosed acute myeloid leukemia. A bone marrow biopsy from 7/3 revealed Acute myeloid leukemia (AML). FISH - report shows a population of aberrant myeloid blasts. He is currently being treated by Dr. Coelho for high risk AML with HIDAC consolidation chemotherapy. He was referred by Dr. Coelho for an initial consultation of a Haplo- transplant. \par \par The patient has a history of multiple blood clots - factor V Leiden runs in the family. He was diagnosed with sleep apnea, but refuses to use CPAP. He is a former smoker, 40 years. He also has a past medical history of PE, HTN, cardiomyopathy. He recently underwent an amputation of the right first toe due to an infection.\par \par S/p hospitalization at [facility] 10/3/19 - 11/1/19 for haplo (son) SCT. \par Upon admission, a TLC was placed in IR. Mr. Delong received IV fluid hydration, pain management, nutritional support, anxiolysis, antiemetics, and antiviral,  antifungal, antibacterial, GI, PCP and VOD prophylaxis. When his ANC dropped  below 1000, he was started on prophylactic Cefepime. Labs were monitored on a daily basis and he received transfusional support and electrolyte repletion as needed. \par \par While in patient, Mr. Delong was continued on his Voriconazole for history of aspergillosis. \par \par S/p hospitalization at [facility] 10/3/19 - 11/1/19 for haplo (son) SCT. \par Upon admission, a TLC was placed in IR. Mr. Delong received IV fluid hydration, pain management, nutritional support, anxiolysis, antiemetics, and antiviral, antifungal, antibacterial, GI, PCP and VOD prophylaxis. When his ANC dropped below 1000, he was started on prophylactic Cefepime. Labs were monitored on a daily basis and he received transfusional support and electrolyte repletion as needed. \par \par While in patient, Mr. Delong was continued on his Voriconazole for history of aspergillosis. \par \par During admission, Mr. Delong had pancytopenia related to the high dose chemotherapy prep regimen. He also experienced neutropenic fevers. When he became febrile, blood and urine cultures were sent and a CXR was completed which were unremarkable. \par \par On 10/9/2019, following premedications, pt received 250 ml of allogeneic, related, haplo, fresh, mobilized HPC apheresis over 30-60 minutes. \par Cell counts as follows: \par \par Total MNCs (x10^8/kg) = 5.17 \par CD 34 cells (x10^6/kg) = 7.34 \par CD 3 Cells (x 10^7/kg) = 19.81 \par Cell viability (%) = 100 \par \par Pt tolerated infusion without any adverse reaction or complications. \par \par Engraftment was noted on 10/28/2019. Daily Zarxio was discontinued, as was Cefepime given negative cultures. Mr. Delong was discharged home to f/u at the [facility].  [de-identified] : On 3/12/20 visit, day +155 of SCT today. Ongoing SOB with exertion is stable compared to last week. Pruritus has resolved but still with rash on neck and chest. Otherwise feeling well with good PO intake/hydration. Compliant with post transplant meds and restrictions. Currently taking FK 0.5mg daily which he did not take prior to lab draw today, and prednisone 15mg daily. Denies fever, chills, headache, dizziness, blurred vision, mucositis/odynophagia, chest pain, cough, nausea/vomiting, diarrhea/constipation, abdominal pain, dysuria, LE edema, bleeding, pain \par \par On 3/19/20 visit, day + 161 of SCT today. Ongoing shortness of breath with exertion is stable compared to last week. Mild erythematous rash of chest and neck. Overall feeling well. On FK 0.5 mg daily, prednisone 15 mg daily and lasix 20 mg daily. Mild edema of left lower extremity. Denies fever, chills, nausea, vomiting, diarrhea, mouth sores, dysuria or any signs of active bleeding. Denies chest pain. Remains compliant with post transplant diet and crowd restrictions. Remains compliant with post transplant medications. \par \par On 4/2/20 visit, day + 176 of SCT today. Overall fatigued and has dyspnea on exertion. States did not sleep well last night. Has moved into his parents house yesterday temporarily due to his girlfriend potentially being exposed to COVID19. Currently on FK 0.5 mg daily and prednisone 15 mg daily. Mild erythematous rash of chest and neck. Denies fever, chills, nausea, vomiting, diarrhea, mouth sores or any signs of active bleeding. Denies SOB or chest pain. Left calf slightly more edematous compared to right calf. Has been taking lasix daily. Remains compliant with post transplant diet and crowd restrictions. Remains compliant with post transplant medications.

## 2020-04-04 NOTE — REVIEW OF SYSTEMS
[Fatigue] : fatigue [Lower Ext Edema] : lower extremity edema [SOB on Exertion] : shortness of breath during exertion [Skin Rash] : skin rash [Anxiety] : anxiety [Negative] : Heme/Lymph [Fever] : no fever [Chills] : no chills [Night Sweats] : no night sweats [Eye Pain] : no eye pain [Dry Eyes] : no dryness of the eyes [Vision Problems] : no vision problems [Dysphagia] : no dysphagia [Nosebleeds] : no nosebleeds [Odynophagia] : no odynophagia [Mucosal Pain] : no mucosal pain [Chest Pain] : no chest pain [Wheezing] : no wheezing [Cough] : no cough [Abdominal Pain] : no abdominal pain [Vomiting] : no vomiting [Constipation] : no constipation [Diarrhea] : no diarrhea [Dysuria] : no dysuria [Joint Pain] : no joint pain [Muscle Pain] : no muscle pain [Dizziness] : no dizziness [Fainting] : no fainting [FreeTextEntry5] : left lower extremity edema [FreeTextEntry6] : dyspnea on exertion [de-identified] : rash on neck and chest [de-identified] : manageable with Xanax

## 2020-04-06 LAB
CMV DNA SPEC QL NAA+PROBE: NOT DETECTED
CMVPCR LOG: NOT DETECTED LOG10IU/ML

## 2020-04-07 LAB
CD3 AND CD33 ENRICHMENT: NORMAL
ENGRAFTMNET-POST: NORMAL

## 2020-04-09 ENCOUNTER — RESULT REVIEW (OUTPATIENT)
Age: 60
End: 2020-04-09

## 2020-04-09 ENCOUNTER — APPOINTMENT (OUTPATIENT)
Dept: HEMATOLOGY ONCOLOGY | Facility: CLINIC | Age: 60
End: 2020-04-09
Payer: COMMERCIAL

## 2020-04-09 ENCOUNTER — RX RENEWAL (OUTPATIENT)
Age: 60
End: 2020-04-09

## 2020-04-09 VITALS
HEART RATE: 93 BPM | TEMPERATURE: 98 F | BODY MASS INDEX: 27.51 KG/M2 | OXYGEN SATURATION: 95 % | DIASTOLIC BLOOD PRESSURE: 80 MMHG | SYSTOLIC BLOOD PRESSURE: 123 MMHG | WEIGHT: 202.83 LBS | RESPIRATION RATE: 18 BRPM

## 2020-04-09 LAB
ALBUMIN SERPL ELPH-MCNC: 3.9 G/DL
ALP BLD-CCNC: 88 U/L
ALT SERPL-CCNC: 14 U/L
ANION GAP SERPL CALC-SCNC: 11 MMOL/L
ANISOCYTOSIS BLD QL: SLIGHT — SIGNIFICANT CHANGE UP
AST SERPL-CCNC: 17 U/L
BASOPHILS # BLD AUTO: 0 K/UL — SIGNIFICANT CHANGE UP (ref 0–0.2)
BASOPHILS NFR BLD AUTO: 0 % — SIGNIFICANT CHANGE UP (ref 0–2)
BILIRUB SERPL-MCNC: 0.4 MG/DL
BLD GP AB SCN SERPL QL: NEGATIVE — SIGNIFICANT CHANGE UP
BUN SERPL-MCNC: 24 MG/DL
CALCIUM SERPL-MCNC: 8.9 MG/DL
CHLORIDE SERPL-SCNC: 108 MMOL/L
CO2 SERPL-SCNC: 25 MMOL/L
CREAT SERPL-MCNC: 1.09 MG/DL
DACRYOCYTES BLD QL SMEAR: SLIGHT — SIGNIFICANT CHANGE UP
EOSINOPHIL # BLD AUTO: 0.14 K/UL — SIGNIFICANT CHANGE UP (ref 0–0.5)
EOSINOPHIL NFR BLD AUTO: 2 % — SIGNIFICANT CHANGE UP (ref 0–6)
GLUCOSE SERPL-MCNC: 150 MG/DL
HCT VFR BLD CALC: 27.6 % — LOW (ref 39–50)
HGB BLD-MCNC: 8.6 G/DL — LOW (ref 13–17)
HYPOCHROMIA BLD QL: SLIGHT — SIGNIFICANT CHANGE UP
LDH SERPL-CCNC: 260 U/L
LYMPHOCYTES # BLD AUTO: 2.71 K/UL — SIGNIFICANT CHANGE UP (ref 1–3.3)
LYMPHOCYTES # BLD AUTO: 38 % — SIGNIFICANT CHANGE UP (ref 13–44)
MACROCYTES BLD QL: SIGNIFICANT CHANGE UP
MAGNESIUM SERPL-MCNC: 1.9 MG/DL
MCHC RBC-ENTMCNC: 31.2 GM/DL — LOW (ref 32–36)
MCHC RBC-ENTMCNC: 38.6 PG — HIGH (ref 27–34)
MCV RBC AUTO: 123.8 FL — HIGH (ref 80–100)
MONOCYTES # BLD AUTO: 0.5 K/UL — SIGNIFICANT CHANGE UP (ref 0–0.9)
MONOCYTES NFR BLD AUTO: 7 % — SIGNIFICANT CHANGE UP (ref 2–14)
NEUTROPHILS # BLD AUTO: 3.78 K/UL — SIGNIFICANT CHANGE UP (ref 1.8–7.4)
NEUTROPHILS NFR BLD AUTO: 53 % — SIGNIFICANT CHANGE UP (ref 43–77)
NRBC # BLD: 1 /100 — HIGH (ref 0–0)
NRBC # BLD: SIGNIFICANT CHANGE UP /100 WBCS (ref 0–0)
PLAT MORPH BLD: NORMAL — SIGNIFICANT CHANGE UP
PLATELET # BLD AUTO: 29 K/UL — LOW (ref 150–400)
POIKILOCYTOSIS BLD QL AUTO: SLIGHT — SIGNIFICANT CHANGE UP
POLYCHROMASIA BLD QL SMEAR: SLIGHT — SIGNIFICANT CHANGE UP
POTASSIUM SERPL-SCNC: 4.4 MMOL/L
PROT SERPL-MCNC: 5.7 G/DL
RBC # BLD: 2.23 M/UL — LOW (ref 4.2–5.8)
RBC # FLD: 20.2 % — HIGH (ref 10.3–14.5)
RBC BLD AUTO: ABNORMAL
RH IG SCN BLD-IMP: POSITIVE — SIGNIFICANT CHANGE UP
SODIUM SERPL-SCNC: 144 MMOL/L
TACROLIMUS SERPL-MCNC: 8.4 NG/ML
WBC # BLD: 7.13 K/UL — SIGNIFICANT CHANGE UP (ref 3.8–10.5)
WBC # FLD AUTO: 7.13 K/UL — SIGNIFICANT CHANGE UP (ref 3.8–10.5)

## 2020-04-09 PROCEDURE — 86850 RBC ANTIBODY SCREEN: CPT

## 2020-04-09 PROCEDURE — 99215 OFFICE O/P EST HI 40 MIN: CPT

## 2020-04-09 PROCEDURE — 86901 BLOOD TYPING SEROLOGIC RH(D): CPT

## 2020-04-09 PROCEDURE — 86900 BLOOD TYPING SEROLOGIC ABO: CPT

## 2020-04-09 NOTE — HISTORY OF PRESENT ILLNESS
[de-identified] : Mr. Delong is a 58 y/o male, with a previously diagnosed acute myeloid leukemia. A bone marrow biopsy from 7/3 revealed Acute myeloid leukemia (AML). FISH - report shows a population of aberrant myeloid blasts. He is currently being treated by Dr. Coelho for high risk AML with HIDAC consolidation chemotherapy. He was referred by Dr. Coelho for an initial consultation of a Haplo- transplant. \par \par The patient has a history of multiple blood clots - factor V Leiden runs in the family. He was diagnosed with sleep apnea, but refuses to use CPAP. He is a former smoker, 40 years. He also has a past medical history of PE, HTN, cardiomyopathy. He recently underwent an amputation of the right first toe due to an infection.\par \par S/p hospitalization at [facility] 10/3/19 - 11/1/19 for haplo (son) SCT. \par Upon admission, a TLC was placed in IR. Mr. Delong received IV fluid hydration, pain management, nutritional support, anxiolysis, antiemetics, and antiviral,  antifungal, antibacterial, GI, PCP and VOD prophylaxis. When his ANC dropped  below 1000, he was started on prophylactic Cefepime. Labs were monitored on a daily basis and he received transfusional support and electrolyte repletion as needed. \par \par While in patient, Mr. Delong was continued on his Voriconazole for history of aspergillosis. \par \par S/p hospitalization at [facility] 10/3/19 - 11/1/19 for haplo (son) SCT. \par Upon admission, a TLC was placed in IR. Mr. Delong received IV fluid hydration, pain management, nutritional support, anxiolysis, antiemetics, and antiviral, antifungal, antibacterial, GI, PCP and VOD prophylaxis. When his ANC dropped below 1000, he was started on prophylactic Cefepime. Labs were monitored on a daily basis and he received transfusional support and electrolyte repletion as needed. \par \par While in patient, Mr. Delong was continued on his Voriconazole for history of aspergillosis. \par \par During admission, Mr. Delong had pancytopenia related to the high dose chemotherapy prep regimen. He also experienced neutropenic fevers. When he became febrile, blood and urine cultures were sent and a CXR was completed which were unremarkable. \par \par On 10/9/2019, following premedications, pt received 250 ml of allogeneic, related, haplo, fresh, mobilized HPC apheresis over 30-60 minutes. \par Cell counts as follows: \par \par Total MNCs (x10^8/kg) = 5.17 \par CD 34 cells (x10^6/kg) = 7.34 \par CD 3 Cells (x 10^7/kg) = 19.81 \par Cell viability (%) = 100 \par \par Pt tolerated infusion without any adverse reaction or complications. \par \par Engraftment was noted on 10/28/2019. Daily Zarxio was discontinued, as was Cefepime given negative cultures. Mr. Delong was discharged home to f/u at the [facility].  [de-identified] : On 3/12/20 visit, day +155 of SCT today. Ongoing SOB with exertion is stable compared to last week. Pruritus has resolved but still with rash on neck and chest. Otherwise feeling well with good PO intake/hydration. Compliant with post transplant meds and restrictions. Currently taking FK 0.5mg daily which he did not take prior to lab draw today, and prednisone 15mg daily. Denies fever, chills, headache, dizziness, blurred vision, mucositis/odynophagia, chest pain, cough, nausea/vomiting, diarrhea/constipation, abdominal pain, dysuria, LE edema, bleeding, pain \par \par On 3/19/20 visit, day + 161 of SCT today. Ongoing shortness of breath with exertion is stable compared to last week. Mild erythematous rash of chest and neck. Overall feeling well. On FK 0.5 mg daily, prednisone 15 mg daily and lasix 20 mg daily. Mild edema of left lower extremity. Denies fever, chills, nausea, vomiting, diarrhea, mouth sores, dysuria or any signs of active bleeding. Denies chest pain. Remains compliant with post transplant diet and crowd restrictions. Remains compliant with post transplant medications. \par \par On 4/2/20 visit, day + 176 of SCT today. Overall fatigued and has dyspnea on exertion. States did not sleep well last night. Has moved into his parents house yesterday temporarily due to his girlfriend potentially being exposed to COVID19. Currently on FK 0.5 mg daily and prednisone 15 mg daily. Mild erythematous rash of chest and neck. Denies fever, chills, nausea, vomiting, diarrhea, mouth sores or any signs of active bleeding. Denies SOB or chest pain. Left calf slightly more edematous compared to right calf. Has been taking lasix daily. Remains compliant with post transplant diet and crowd restrictions. Remains compliant with post transplant medications. \par \par 4/9...some martinez...no n/v/d..no fever or cough.....skin dry but rash improved...mild erythema of eyes

## 2020-04-09 NOTE — ASSESSMENT
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 with the following comorbidities being managed:\par \par 1) AML\par S/p haplo (son) PSCT on 10/9/19\par 12/4/19 chimerism: 100% donor\par 12/24/19 chimerism = 100% donor (CD33 = 100% and CD3 = 99.3% donor)\par 1/8/20 chimerism = 100% donor\par 2/5/20 chimerism = 100% donor\par 2/21/20 chimerism = 100% donor\par 3/3/20 chimerism = 100% \par 3/19/20 chimerism= 100% donor \par 4/2 99%\par Post transplant BMBx pending \par \par 2) Heme\par ABO compatible transplant: pt and donor are both A pos\par Ongoing anemia and thrombocytopenia poss 2/2 Valcyte, d/c'd as of 2/14/20\par No indication for transfusion today\par    4/2/20:   WBC 8.1   ANC 5.2  Hgb 8.4   PLT 28\par Continue multivitamin and folic acid daily\par \par Hx of recurrent DVT/PE\par Factor V Leiden reportedly runs in the family \par 11/21/19 Doppler US of RUE negative for DVT\par Xarelto on hold while pancytopenic, to resume when PLT consistently >100K\par Consider Doppler US of LEs if edema persists ...may be related to pred\par \par 3) ID\par Continue ppx:\par - Acyclovir 400mg bid resumed 2/26/20\par - Mepron 750mg bid\par - Voriconazole 200mg bid - hx of aspergillosis \par Post transplant crowd and dietary restrictions reviewed\par \par 3/12/20 CXR with improving opacities in setting of recent PNA, continue to monitor \par \par CMV viremia\par 11/7/19 CMV PCR = 247\par 11/14/19 CMV PCR = 2,205\par 11/20/19 CMV PCR = 8,581\par 11/26/19 CMV PCR = 4103\par 12/4/19 CMV PCR = 215\par 3/12/20 CMV PCR  less than 50.\par 3/26/20 CMV PCR negative\par Valcyte 450mg BID started 11/21/19. Decreased to QD as of 12/17/19. Held as of 2/14/20 for ongoing thrombocytopenia \par Continue to monitor PCR weekly \par \par 4) GVHD\par Skin 0   Liver 0   GI 0 - overall grade 0\par No signs of chronic GVHD at this time\par Off MMF as of 11/22/19 \par Continue FK 0.5 mg daily - pending today's level\par decrease  prednisone 10 mg daily, initially started for FTT but developed GVHD upon tapering \par artificial tears ..will reorder opthalmic antibiotic drops...helped in the past\par Reviewed signs/symptoms of GVHD (i.e. rash, mucositis, nausea/vomiting, diarrhea)\par \par aGVHD of skin \par Maculopapular pruritic rash of face, neck, and upper chest (BSA 19%) noted 1/21/20 in setting of tapering prednisone (for FTT) - max skin stage 1, overall grade 1\par 1/21/20: rash on face, neck, and chest (BSA 19%); skin 1, overall grade 1; prednisone increased to 10mg daily\par 2/14/20: rash on neck, chest, and bilateral upper arms (BSA 18%); skin 1, overall grade 1: prednisone increased to 20mg daily\par 2/21/20: rash on neck and chest (10% BSA); skin 1, overall grade 1. Continue prednisone 20mg daily\par 3/12/20: rash on neck and chest (10% BSA); skin 1, overall grade 1. Continue prednisone 15mg daily  \par 3/19/20: rash on neck and chest (10% BSA); skin 1, overall grade 1. Continue prednisone 15 mg daily\par 4/2/20: rash on neck and chest (10% BSA); skin 1, overall grade 1. Continue prednisone 15 mg daily\par rash resolved 4/9\par Continue triamcinolone 0.1% cream bid prn\par Continue to monitor closely \par \par 5) GI\par Continue ppx:\par - Protonix 40 mg daily\par - Ursodiol 300 mg bid\par PRN Zofran and Reglan for nausea/vomiting\par \par 6) Failure to thrive\par Poor PO intake/hydration with resulting hypotension\par Prednisone 20 mg daily started 11/20/19 with resolution of symptoms\par RESOLVED\par \par 7) Cardiac\par HTN - continue metoprolol succinate 12.5mg bid. Continue to hold enalapril 2.5mg daily in light of recent hypotension\par Cardiomyopathy - continue Lasix 20mg daily for suspected fluid retention causing SOB\par \par 8) Other\par BPH - continue finasteride 5mg daily and tamsulosin 0.4mg daily \par Hypomagnesemia - likely 2/2 FK, continue MgOx 400mg tid\par Anxiety/Depression - continue Zoloft  daily and PRN Xanax 0.5mg qhs\par \par 9) Plan/Dispo\par Pt educated regarding plan of care, covid caution,  all questions/concerns addressed\par Continue weekly appts at this time..alt in person with tele

## 2020-04-09 NOTE — PHYSICAL EXAM
[Ambulatory and capable of all self care but unable to carry out any work activities] : Status 2- Ambulatory and capable of all self care but unable to carry out any work activities. Up and about more than 50% of waking hours [Normal] : affect appropriate [de-identified] :  regular rhythm, normal s1s2 [de-identified] : LLE>RLE [de-identified] : +BS; abdomen soft, non-distended, non-tender [de-identified] : s/p right first toe amputation.  [de-identified] : erythematous rash of neck and chest resolved

## 2020-04-09 NOTE — REVIEW OF SYSTEMS
[Fatigue] : fatigue [Lower Ext Edema] : lower extremity edema [SOB on Exertion] : shortness of breath during exertion [Skin Rash] : skin rash [Anxiety] : anxiety [Negative] : Heme/Lymph [Fever] : no fever [Chills] : no chills [Night Sweats] : no night sweats [Eye Pain] : no eye pain [Dry Eyes] : no dryness of the eyes [Vision Problems] : no vision problems [Dysphagia] : no dysphagia [Nosebleeds] : no nosebleeds [Odynophagia] : no odynophagia [Mucosal Pain] : no mucosal pain [Chest Pain] : no chest pain [Wheezing] : no wheezing [Cough] : no cough [Abdominal Pain] : no abdominal pain [Vomiting] : no vomiting [Constipation] : no constipation [Diarrhea] : no diarrhea [Dysuria] : no dysuria [Joint Pain] : no joint pain [Muscle Pain] : no muscle pain [Dizziness] : no dizziness [Fainting] : no fainting [FreeTextEntry5] : left lower extremity edema [FreeTextEntry6] : dyspnea on exertion [de-identified] : rash on neck and chest improved [de-identified] : manageable with Xanax

## 2020-04-12 LAB
ALBUMIN SERPL ELPH-MCNC: 3.9 G/DL
ALBUMIN SERPL ELPH-MCNC: 4 G/DL
ALBUMIN SERPL ELPH-MCNC: 4.2 G/DL
ALP BLD-CCNC: 151 U/L
ALP BLD-CCNC: 81 U/L
ALP BLD-CCNC: 84 U/L
ALT SERPL-CCNC: 10 U/L
ALT SERPL-CCNC: 13 U/L
ALT SERPL-CCNC: 43 U/L
ANION GAP SERPL CALC-SCNC: 11 MMOL/L
ANION GAP SERPL CALC-SCNC: 11 MMOL/L
ANION GAP SERPL CALC-SCNC: 9 MMOL/L
AST SERPL-CCNC: 15 U/L
AST SERPL-CCNC: 17 U/L
AST SERPL-CCNC: 25 U/L
BILIRUB SERPL-MCNC: 0.2 MG/DL
BILIRUB SERPL-MCNC: 0.2 MG/DL
BILIRUB SERPL-MCNC: 0.3 MG/DL
BUN SERPL-MCNC: 13 MG/DL
BUN SERPL-MCNC: 16 MG/DL
BUN SERPL-MCNC: 24 MG/DL
CALCIUM SERPL-MCNC: 9.3 MG/DL
CALCIUM SERPL-MCNC: 9.5 MG/DL
CALCIUM SERPL-MCNC: 9.6 MG/DL
CHLORIDE SERPL-SCNC: 100 MMOL/L
CHLORIDE SERPL-SCNC: 103 MMOL/L
CHLORIDE SERPL-SCNC: 105 MMOL/L
CO2 SERPL-SCNC: 24 MMOL/L
CO2 SERPL-SCNC: 25 MMOL/L
CO2 SERPL-SCNC: 26 MMOL/L
CREAT SERPL-MCNC: 1.15 MG/DL
CREAT SERPL-MCNC: 1.36 MG/DL
CREAT SERPL-MCNC: 1.57 MG/DL
GLUCOSE SERPL-MCNC: 103 MG/DL
GLUCOSE SERPL-MCNC: 74 MG/DL
GLUCOSE SERPL-MCNC: 99 MG/DL
LDH SERPL-CCNC: 203 U/L
LDH SERPL-CCNC: 216 U/L
POTASSIUM SERPL-SCNC: 4.4 MMOL/L
POTASSIUM SERPL-SCNC: 4.7 MMOL/L
POTASSIUM SERPL-SCNC: 5.3 MMOL/L
PROT SERPL-MCNC: 5.9 G/DL
PROT SERPL-MCNC: 6.3 G/DL
PROT SERPL-MCNC: 6.5 G/DL
SODIUM SERPL-SCNC: 137 MMOL/L
SODIUM SERPL-SCNC: 137 MMOL/L
SODIUM SERPL-SCNC: 140 MMOL/L

## 2020-04-13 LAB — CMV DNA SPEC QL NAA+PROBE: NOT DETECTED

## 2020-04-15 LAB
CD3 AND CD33 ENRICHMENT: NORMAL
ENGRAFTMNET-POST: NORMAL

## 2020-04-16 ENCOUNTER — APPOINTMENT (OUTPATIENT)
Dept: HEMATOLOGY ONCOLOGY | Facility: CLINIC | Age: 60
End: 2020-04-16

## 2020-04-16 ENCOUNTER — RX RENEWAL (OUTPATIENT)
Age: 60
End: 2020-04-16

## 2020-04-17 ENCOUNTER — APPOINTMENT (OUTPATIENT)
Dept: HEMATOLOGY ONCOLOGY | Facility: CLINIC | Age: 60
End: 2020-04-17
Payer: COMMERCIAL

## 2020-04-17 PROCEDURE — 99442: CPT

## 2020-04-17 NOTE — HISTORY OF PRESENT ILLNESS
[Home] : at home, [unfilled] , at the time of the visit. [Other Location: e.g. Home (Enter Location, City,State)___] : at [unfilled] [Partner] : partner [Patient] : the patient [Self] : self [de-identified] : Mr. Delong is a 58 y/o male, with a previously diagnosed acute myeloid leukemia. A bone marrow biopsy from 7/3 revealed Acute myeloid leukemia (AML). FISH - report shows a population of aberrant myeloid blasts. He is currently being treated by Dr. Coelho for high risk AML with HIDAC consolidation chemotherapy. He was referred by Dr. Coelho for an initial consultation of a Haplo- transplant. \par \par The patient has a history of multiple blood clots - factor V Leiden runs in the family. He was diagnosed with sleep apnea, but refuses to use CPAP. He is a former smoker, 40 years. He also has a past medical history of PE, HTN, cardiomyopathy. 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 \par Upon admission, a TLC was placed in IR. Mr. Delong received IV fluid hydration, pain management, nutritional support, anxiolysis, antiemetics, and antiviral,  antifungal, antibacterial, GI, PCP and VOD prophylaxis. When his ANC dropped  below 1000, he was started on prophylactic Cefepime. Labs were monitored on a daily basis and he received transfusional support and electrolyte repletion as needed. \par \par While in patient, Mr. Delong was continued on his Voriconazole for history of aspergillosis. 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 \par Upon admission, a TLC was placed in IR. Mr. Delong received IV fluid hydration, pain management, nutritional support, anxiolysis, antiemetics, and antiviral, antifungal, antibacterial, GI, PCP and VOD prophylaxis. When his ANC dropped below 1000, he was started on prophylactic Cefepime. Labs were monitored on a daily basis and he received transfusional support and electrolyte repletion as needed. \par \par While in patient, Mr. Delong was continued on his Voriconazole for history of aspergillosis. \par \par During admission, Mr. Delong had pancytopenia related to the high dose chemotherapy prep regimen. He also experienced neutropenic fevers. When he became febrile, blood and urine cultures were sent and a CXR was completed which were unremarkable. \par \par On 10/9/2019, following premedications, pt received 250 ml of allogeneic, related, haplo, fresh, mobilized HPC apheresis over 30-60 minutes. \par Cell counts as follows: \par \par Total MNCs (x10^8/kg) = 5.17 \par CD 34 cells (x10^6/kg) = 7.34 \par CD 3 Cells (x 10^7/kg) = 19.81 \par Cell viability (%) = 100 \par \par Pt tolerated infusion without any adverse reaction or complications. \par \par Engraftment was noted on 10/28/2019. Daily Zarxio was discontinued, as was Cefepime given negative cultures. Mr. Delong was discharged home to f/u at the [facility].  [de-identified] : On 4/17/20 telephone visit, day +191 of SCT today. Overall continues to feel well. Reports adequate PO intake and hydration. Ongoing SOB on exertion, waxes and wanes. Redness and discharge from eyes has resolved with abx eye gtts. Reports recurrent redness on chest and neck, admits he has not been compliant with steroid cream. Stable BLE edema, L>R which waxes and wanes. Ongoing insomnia despite Xanax 0.5mg qhs. Compliant with post transplant meds and restrictions. Currently taking FK 0.5mg daily and prednisone 10mg daily. Denies fever, chills, headache, dizziness, blurred vision, mucositis/odynophagia, chest pain, cough, nausea/vomiting, diarrhea/constipation, abdominal pain, dysuria, bleeding, pain

## 2020-04-17 NOTE — ASSESSMENT
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\par 12/4/19 chimerism: 100% donor\par 12/24/19 chimerism = 100% donor (CD33 = 100% and CD3 = 99.3% donor)\par 1/8/20 chimerism = 100% donor\par 2/5/20 chimerism = 100% donor\par 2/21/20 chimerism = 100% donor\par 3/3/20 chimerism = 100% donor\par 3/19/20 chimerism = 100% donor \par 4/2 chimerism = 99% donor\par Post transplant BMBx pending \par \par 2) Heme\par ABO compatible transplant: pt and donor are both A pos\par Ongoing anemia and thrombocytopenia poss 2/2 Valcyte, d/c'd as of 2/14/20\par    4/9/20:   WBC 7.13   ANC 3.78   Hgb 8.6   PLT 29\par Continue multivitamin and folic acid daily\par \par Hx of recurrent DVT/PE\par Factor V Leiden reportedly runs in the family \par 11/21/19 Doppler US of RUE negative for DVT\par Xarelto on hold while pancytopenic, to resume when PLT consistently >100K\par BLE edema poss r/t prednisone, consider Doppler US if edema persists\par \par 3) ID\par Continue ppx:\par - Acyclovir 400mg bid - resumed 2/26/20\par - Mepron 750mg bid\par - Voriconazole 200mg bid - hx of aspergillosis \par Post transplant crowd and dietary restrictions reviewed\par \par 3/12/20 CXR with improving opacities in setting of recent PNA, continue to monitor \par \par CMV viremia\par CMV PCR peaked at 8,581 on 11/20/19\par 3/26/20 CMV PCR not detected \par 4/9/20 CMV PCR not detected\par Valcyte 450mg BID started 11/21/19. Decreased to QD as of 12/17/19. Held as of 2/14/20 for ongoing thrombocytopenia \par Continue to monitor PCR at ever visit \par \par 4) GVHD\par Skin 1   Liver 0   GI 0 - overall grade 1\par No signs of chronic GVHD at this time\par Off MMF as of 11/22/19 \par Continue FK 0.5 mg daily - pending today's level\par Continue prednisone 10 mg daily, initially started for FTT but developed GVHD upon tapering \par Reviewed signs/symptoms of GVHD (i.e. rash, mucositis, nausea/vomiting, diarrhea)\par \par aGVHD of skin \par Maculopapular pruritic rash of face, neck, and upper chest (BSA 19%) noted 1/21/20 in setting of tapering prednisone (for FTT) - max skin stage 1, overall grade 1\par 1/21/20: rash on face, neck, and chest (BSA 19%); skin 1, overall grade 1; prednisone increased to 10mg daily\par 2/14/20: rash on neck, chest, and bilateral upper arms (BSA 18%); skin 1, overall grade 1: prednisone increased to 20mg daily\par 2/21/20: rash on neck and chest (10% BSA); skin 1, overall grade 1. Continue prednisone 20mg daily\par 3/12/20: rash on neck and chest (10% BSA); skin 1, overall grade 1. Continue prednisone 15mg daily  \par 3/19/20: rash on neck and chest (10% BSA); skin 1, overall grade 1. Continue prednisone 15 mg daily\par 4/2/20: rash on neck and chest (10% BSA); skin 1, overall grade 1. Continue prednisone 15 mg daily\par 4/9/20: rash resolved, prednisone decreased to 10mg daily\par 4/17/20: rash on neck and chest (10% BSA); skin 1, overall grade 1. Continue prednisone 10mg daily and reinforced compliance with triamcinolone cream bid \par Continue triamcinolone 0.1% cream bid prn\par Continue to monitor closely \par \par 5) GI\par Continue ppx:\par - Protonix 40 mg daily\par - Ursodiol 300 mg bid\par PRN Zofran and Reglan for nausea/vomiting\par \par 6) Failure to thrive\par Poor PO intake/hydration with resulting hypotension\par Prednisone 20 mg daily started 11/20/19 with resolution of symptoms\par RESOLVED\par \par 7) Cardiac\par HTN - continue metoprolol succinate 12.5mg bid. Continue to hold enalapril 2.5mg daily in light of recent hypotension\par Cardiomyopathy - continue Lasix 20mg daily for suspected fluid retention causing SOB\par \par 8) Other\par BPH - continue finasteride 5mg daily and tamsulosin 0.4mg daily \par Hypomagnesemia - likely 2/2 FK, continue MgOx 400mg tid\par Anxiety/Depression - continue Zoloft 50mg daily\par Insomnia - continue PRN Xanax 0.5-1mg qhs \par \par 9) Plan/Dispo\par Pt educated regarding plan of care, all questions/concerns addressed\par Instructed to contact our office immediately with fever or new/worsening yesterday\par Covid-19 restrictions and CDC recs reviewed\par Continue weekly appts at this time given anemia/thrombocytopenia and rash\par F/u with NP on 4/23/20

## 2020-04-20 ENCOUNTER — OUTPATIENT (OUTPATIENT)
Dept: OUTPATIENT SERVICES | Facility: HOSPITAL | Age: 60
LOS: 1 days | Discharge: HOME | End: 2020-04-20

## 2020-04-20 DIAGNOSIS — C92.00 ACUTE MYELOBLASTIC LEUKEMIA, NOT HAVING ACHIEVED REMISSION: ICD-10-CM

## 2020-04-22 ENCOUNTER — INPATIENT (INPATIENT)
Facility: HOSPITAL | Age: 60
LOS: 6 days | Discharge: HOME | DRG: 871 | End: 2020-04-29
Attending: INTERNAL MEDICINE | Admitting: STUDENT IN AN ORGANIZED HEALTH CARE EDUCATION/TRAINING PROGRAM
Payer: COMMERCIAL

## 2020-04-22 VITALS
WEIGHT: 199.96 LBS | SYSTOLIC BLOOD PRESSURE: 106 MMHG | HEIGHT: 73 IN | DIASTOLIC BLOOD PRESSURE: 72 MMHG | RESPIRATION RATE: 30 BRPM | HEART RATE: 120 BPM | OXYGEN SATURATION: 93 %

## 2020-04-22 DIAGNOSIS — A41.9 SEPSIS, UNSPECIFIED ORGANISM: ICD-10-CM

## 2020-04-22 LAB
ALBUMIN SERPL ELPH-MCNC: 4 G/DL — SIGNIFICANT CHANGE UP (ref 3.3–5)
ALP SERPL-CCNC: 96 U/L — SIGNIFICANT CHANGE UP (ref 40–120)
ALT FLD-CCNC: 12 U/L — SIGNIFICANT CHANGE UP (ref 10–45)
ANION GAP SERPL CALC-SCNC: 17 MMOL/L — SIGNIFICANT CHANGE UP (ref 5–17)
ANISOCYTOSIS BLD QL: SIGNIFICANT CHANGE UP
APPEARANCE UR: CLEAR — SIGNIFICANT CHANGE UP
AST SERPL-CCNC: 16 U/L — SIGNIFICANT CHANGE UP (ref 10–40)
BACTERIA # UR AUTO: NEGATIVE — SIGNIFICANT CHANGE UP
BASE EXCESS BLDV CALC-SCNC: 2 MMOL/L — SIGNIFICANT CHANGE UP (ref -2–2)
BASOPHILS # BLD AUTO: 0 K/UL — SIGNIFICANT CHANGE UP (ref 0–0.2)
BASOPHILS NFR BLD AUTO: 0 % — SIGNIFICANT CHANGE UP (ref 0–2)
BILIRUB SERPL-MCNC: 0.7 MG/DL — SIGNIFICANT CHANGE UP (ref 0.2–1.2)
BILIRUB UR-MCNC: NEGATIVE — SIGNIFICANT CHANGE UP
BUN SERPL-MCNC: 27 MG/DL — HIGH (ref 7–23)
CA-I SERPL-SCNC: 1.26 MMOL/L — SIGNIFICANT CHANGE UP (ref 1.12–1.3)
CALCIUM SERPL-MCNC: 10 MG/DL — SIGNIFICANT CHANGE UP (ref 8.4–10.5)
CHLORIDE BLDV-SCNC: 102 MMOL/L — SIGNIFICANT CHANGE UP (ref 96–108)
CHLORIDE SERPL-SCNC: 97 MMOL/L — SIGNIFICANT CHANGE UP (ref 96–108)
CO2 BLDV-SCNC: 28 MMOL/L — SIGNIFICANT CHANGE UP (ref 22–30)
CO2 SERPL-SCNC: 23 MMOL/L — SIGNIFICANT CHANGE UP (ref 22–31)
COLOR SPEC: SIGNIFICANT CHANGE UP
CREAT SERPL-MCNC: 1.28 MG/DL — SIGNIFICANT CHANGE UP (ref 0.5–1.3)
D DIMER BLD IA.RAPID-MCNC: 1662 NG/ML DDU — HIGH
DACRYOCYTES BLD QL SMEAR: SLIGHT — SIGNIFICANT CHANGE UP
DIFF PNL FLD: NEGATIVE — SIGNIFICANT CHANGE UP
EOSINOPHIL # BLD AUTO: 0 K/UL — SIGNIFICANT CHANGE UP (ref 0–0.5)
EOSINOPHIL NFR BLD AUTO: 0 % — SIGNIFICANT CHANGE UP (ref 0–6)
EPI CELLS # UR: 0 /HPF — SIGNIFICANT CHANGE UP
GAS PNL BLDV: 137 MMOL/L — SIGNIFICANT CHANGE UP (ref 135–145)
GAS PNL BLDV: SIGNIFICANT CHANGE UP
GAS PNL BLDV: SIGNIFICANT CHANGE UP
GLUCOSE BLDV-MCNC: 141 MG/DL — HIGH (ref 70–99)
GLUCOSE SERPL-MCNC: 147 MG/DL — HIGH (ref 70–99)
GLUCOSE UR QL: NEGATIVE — SIGNIFICANT CHANGE UP
HCO3 BLDV-SCNC: 27 MMOL/L — SIGNIFICANT CHANGE UP (ref 21–29)
HCT VFR BLD CALC: 31.3 % — LOW (ref 39–50)
HCT VFR BLDA CALC: 30 % — LOW (ref 39–50)
HGB BLD CALC-MCNC: 9.9 G/DL — LOW (ref 13–17)
HGB BLD-MCNC: 9.6 G/DL — LOW (ref 13–17)
HYPOCHROMIA BLD QL: SLIGHT — SIGNIFICANT CHANGE UP
KETONES UR-MCNC: NEGATIVE — SIGNIFICANT CHANGE UP
LACTATE BLDV-MCNC: 2.1 MMOL/L — HIGH (ref 0.7–2)
LEUKOCYTE ESTERASE UR-ACNC: NEGATIVE — SIGNIFICANT CHANGE UP
LYMPHOCYTES # BLD AUTO: 21.2 % — SIGNIFICANT CHANGE UP (ref 13–44)
LYMPHOCYTES # BLD AUTO: 4.1 K/UL — HIGH (ref 1–3.3)
MACROCYTES BLD QL: SIGNIFICANT CHANGE UP
MANUAL SMEAR VERIFICATION: SIGNIFICANT CHANGE UP
MCHC RBC-ENTMCNC: 30.7 GM/DL — LOW (ref 32–36)
MCHC RBC-ENTMCNC: 37.5 PG — HIGH (ref 27–34)
MCV RBC AUTO: 122.3 FL — HIGH (ref 80–100)
MONOCYTES # BLD AUTO: 1.03 K/UL — HIGH (ref 0–0.9)
MONOCYTES NFR BLD AUTO: 5.3 % — SIGNIFICANT CHANGE UP (ref 2–14)
NEUTROPHILS # BLD AUTO: 12.49 K/UL — HIGH (ref 1.8–7.4)
NEUTROPHILS NFR BLD AUTO: 64.6 % — SIGNIFICANT CHANGE UP (ref 43–77)
NITRITE UR-MCNC: NEGATIVE — SIGNIFICANT CHANGE UP
OVALOCYTES BLD QL SMEAR: SLIGHT — SIGNIFICANT CHANGE UP
PCO2 BLDV: 45 MMHG — SIGNIFICANT CHANGE UP (ref 35–50)
PH BLDV: 7.39 — SIGNIFICANT CHANGE UP (ref 7.35–7.45)
PH UR: 8 — SIGNIFICANT CHANGE UP (ref 5–8)
PLAT MORPH BLD: NORMAL — SIGNIFICANT CHANGE UP
PLATELET # BLD AUTO: 49 K/UL — LOW (ref 150–400)
PO2 BLDV: 24 MMHG — LOW (ref 25–45)
POIKILOCYTOSIS BLD QL AUTO: SLIGHT — SIGNIFICANT CHANGE UP
POLYCHROMASIA BLD QL SMEAR: SLIGHT — SIGNIFICANT CHANGE UP
POTASSIUM BLDV-SCNC: 4.6 MMOL/L — SIGNIFICANT CHANGE UP (ref 3.5–5.3)
POTASSIUM SERPL-MCNC: 4.9 MMOL/L — SIGNIFICANT CHANGE UP (ref 3.5–5.3)
POTASSIUM SERPL-SCNC: 4.9 MMOL/L — SIGNIFICANT CHANGE UP (ref 3.5–5.3)
PROCALCITONIN SERPL-MCNC: 2.09 NG/ML — HIGH (ref 0.02–0.1)
PROT SERPL-MCNC: 5.8 G/DL — LOW (ref 6–8.3)
PROT UR-MCNC: NEGATIVE — SIGNIFICANT CHANGE UP
RBC # BLD: 2.56 M/UL — LOW (ref 4.2–5.8)
RBC # FLD: 18.4 % — HIGH (ref 10.3–14.5)
RBC BLD AUTO: ABNORMAL
RBC CASTS # UR COMP ASSIST: 1 /HPF — SIGNIFICANT CHANGE UP (ref 0–4)
SAO2 % BLDV: 35 % — LOW (ref 67–88)
SARS-COV-2 RNA SPEC QL NAA+PROBE: SIGNIFICANT CHANGE UP
SARS-COV-2 RNA SPEC QL NAA+PROBE: SIGNIFICANT CHANGE UP
SODIUM SERPL-SCNC: 137 MMOL/L — SIGNIFICANT CHANGE UP (ref 135–145)
SP GR SPEC: 1.01 — SIGNIFICANT CHANGE UP (ref 1.01–1.02)
UROBILINOGEN FLD QL: NEGATIVE — SIGNIFICANT CHANGE UP
VARIANT LYMPHS # BLD: 8.9 % — HIGH (ref 0–6)
WBC # BLD: 19.34 K/UL — HIGH (ref 3.8–10.5)
WBC # FLD AUTO: 19.34 K/UL — HIGH (ref 3.8–10.5)
WBC UR QL: 1 /HPF — SIGNIFICANT CHANGE UP (ref 0–5)

## 2020-04-22 PROCEDURE — 71045 X-RAY EXAM CHEST 1 VIEW: CPT | Mod: 26

## 2020-04-22 PROCEDURE — 93010 ELECTROCARDIOGRAM REPORT: CPT

## 2020-04-22 PROCEDURE — 99291 CRITICAL CARE FIRST HOUR: CPT

## 2020-04-22 RX ORDER — SODIUM CHLORIDE 9 MG/ML
500 INJECTION INTRAMUSCULAR; INTRAVENOUS; SUBCUTANEOUS ONCE
Refills: 0 | Status: COMPLETED | OUTPATIENT
Start: 2020-04-22 | End: 2020-04-22

## 2020-04-22 RX ORDER — ACETAMINOPHEN 500 MG
975 TABLET ORAL ONCE
Refills: 0 | Status: COMPLETED | OUTPATIENT
Start: 2020-04-22 | End: 2020-04-22

## 2020-04-22 RX ORDER — NOREPINEPHRINE BITARTRATE/D5W 8 MG/250ML
0.05 PLASTIC BAG, INJECTION (ML) INTRAVENOUS
Qty: 8 | Refills: 0 | Status: DISCONTINUED | OUTPATIENT
Start: 2020-04-22 | End: 2020-04-23

## 2020-04-22 RX ORDER — VANCOMYCIN HCL 1 G
1000 VIAL (EA) INTRAVENOUS ONCE
Refills: 0 | Status: COMPLETED | OUTPATIENT
Start: 2020-04-22 | End: 2020-04-22

## 2020-04-22 RX ORDER — HYDROCORTISONE 20 MG
100 TABLET ORAL ONCE
Refills: 0 | Status: COMPLETED | OUTPATIENT
Start: 2020-04-22 | End: 2020-04-22

## 2020-04-22 RX ORDER — SODIUM CHLORIDE 9 MG/ML
1000 INJECTION, SOLUTION INTRAVENOUS ONCE
Refills: 0 | Status: COMPLETED | OUTPATIENT
Start: 2020-04-22 | End: 2020-04-22

## 2020-04-22 RX ORDER — HYDROCORTISONE 20 MG
100 TABLET ORAL ONCE
Refills: 0 | Status: DISCONTINUED | OUTPATIENT
Start: 2020-04-22 | End: 2020-04-22

## 2020-04-22 RX ORDER — AZITHROMYCIN 500 MG/1
500 TABLET, FILM COATED ORAL ONCE
Refills: 0 | Status: COMPLETED | OUTPATIENT
Start: 2020-04-22 | End: 2020-04-22

## 2020-04-22 RX ORDER — CEFTRIAXONE 500 MG/1
1000 INJECTION, POWDER, FOR SOLUTION INTRAMUSCULAR; INTRAVENOUS ONCE
Refills: 0 | Status: COMPLETED | OUTPATIENT
Start: 2020-04-22 | End: 2020-04-22

## 2020-04-22 RX ADMIN — SODIUM CHLORIDE 1000 MILLILITER(S): 9 INJECTION INTRAMUSCULAR; INTRAVENOUS; SUBCUTANEOUS at 19:15

## 2020-04-22 RX ADMIN — Medication 8.5 MICROGRAM(S)/KG/MIN: at 19:49

## 2020-04-22 RX ADMIN — SODIUM CHLORIDE 1000 MILLILITER(S): 9 INJECTION INTRAMUSCULAR; INTRAVENOUS; SUBCUTANEOUS at 18:41

## 2020-04-22 RX ADMIN — SODIUM CHLORIDE 1000 MILLILITER(S): 9 INJECTION, SOLUTION INTRAVENOUS at 19:49

## 2020-04-22 RX ADMIN — CEFTRIAXONE 100 MILLIGRAM(S): 500 INJECTION, POWDER, FOR SOLUTION INTRAMUSCULAR; INTRAVENOUS at 18:19

## 2020-04-22 RX ADMIN — Medication 975 MILLIGRAM(S): at 17:45

## 2020-04-22 RX ADMIN — Medication 250 MILLIGRAM(S): at 21:23

## 2020-04-22 RX ADMIN — Medication 100 MILLIGRAM(S): at 21:21

## 2020-04-22 RX ADMIN — AZITHROMYCIN 250 MILLIGRAM(S): 500 TABLET, FILM COATED ORAL at 18:36

## 2020-04-22 NOTE — ED ADULT NURSE REASSESSMENT NOTE - NS ED NURSE REASSESS COMMENT FT1
Pt AAOx4, resting in bed. Pt denies headache, dizziness, chest pain, palpitations, abd pain, n/v/d, urinary symptoms, fevers, chills, weakness at this time. Pt TBA. Safety maintained. Levophed paused will reassess BP

## 2020-04-22 NOTE — ED PROVIDER NOTE - PROGRESS NOTE DETAILS
Attempted to reach emergency contact Ashlyn x1 - no answer. - Michael Little PA-C Oncology fellow paged x1 - Michael Little PA-C Discussed with Dr Matt Montesinos Fellow will follow in am  Juancarlos Street MD, Facep bp 90/67 on norepi, micu consulted.  Juancarlos Street MD, Facep Dr Guerrero: Spoke with dr Gaytan pts pcp who says pt baseline BP around 100 sys. Wants icu or hospitalist to call her for anything while admitted at [phone] Dr Guerrero: Spoke with dr Gaytan pts pcp who says pt baseline BP around 100 sys. Wants icu or hospitalist to call her for anything while admitted at [phone]. Pt now 108/94 mapping 100 at .05 levo so will cut in half. and transition off levo. satting 100% on NRB, not tachypneic, speaking in full sentences. Dr Guerrero: pt now off levo drip for 30 min, mapping 90's. seen by icu who recommend admission to the floor now that off drip. based on labs and h&p pt was likely in early state of septic shock 2/2 likely covid with bacterial infection as well (procal 2 so added vanc due to immunosuppressive state) but is improving, aaox3, warm extremities with cap refill <2s, lactate of only 2.1. Lukas and ICU Recommends non covid private room since BM pt. relayed message to hospitalist. ED Provider Sepsis Reassessment Note: VS  BP  108/86        , HR   102     , RR 25     , T  97.9F      , POx  96%. Patient evaluated after fluid administration and off levo several hours: skin exam capillary refill < 2 seconds, lower extremities warm with no edema, and dorsalis pedis pulse 2+ bilaterally.  Patient responding well to resuscitation. Mentating well.

## 2020-04-22 NOTE — ED PROVIDER NOTE - ATTENDING CONTRIBUTION TO CARE
Private Physician Olivia Gaytan Onc  60y male pmh Active Problems AML SP BTM, Anemia,Cardiomyopathy, CVM COLLEEN, HTN. PE, PT comes to ed complains of shortness of breath since yesterday. EMS responded and pt RMA Today more symptomatic with fever tmax 101, with mild confusion. PT responed to oxygen and improved ms. PT currently complains of mild shortness of breath, chills. No abd pain,  PE Adult male looking acutely ill normocephalic atraumatic neck supple chest clear anterior & posterior a abd soft +bs no mass cv tachy, abd soft +bs neuro moves all extr pain light touch intat  Juancarlos Street MD, Facep

## 2020-04-22 NOTE — ED ADULT NURSE NOTE - OBJECTIVE STATEMENT
"The patient is a 68 y.o. male with hx of PAD, on Plavix who presents with complaint of hematemesis, onset 20 minutes PTA. Pt reports multiple episodes of emesis with bright red blood. He has associated abdominal "fullness," lightheadedness, and generalized weakness. He states that he felt lightheaded when getting off stretcher to walk to ED bed. Pt also states that in the past few days he has felt like his food has been getting stuck when swallow, sensation typically resolved without intervention. Pt denies any fever, chills, diarrhea, abdominal pain, chest pain, SOB, back pain, dizziness, extremity weakness, urinary sx, and rash. Pt reports no hx of drinking. He did not take his daily ASA today, but did take Plavix.      " 59 y/o male BIBA for cough x2 days and fever. States he last took Tylenol 6 hours ago, has had nonproductive dry cough and highest fever at home 101. Denies chest pain, sob, ha, n/v/d, abdominal pain, urinary symptoms, hematuria. Hx of Bone marrow transplant. A&Ox4, hypoxic/ tachypneic  to 85 RA- placed on nonrebreather, tachycardic, febrile. skin warm dry and intact, MAEx4, lungs CTA, abd soft nondistended. Patient's bed in the lowest position, explained plan of care to patient and family members. Will continue to reassess.

## 2020-04-22 NOTE — ED PROVIDER NOTE - CARE PLAN
Principal Discharge DX:	Septic shock  Secondary Diagnosis:	Respiratory distress, acute  Secondary Diagnosis:	Hypoxia

## 2020-04-22 NOTE — ED PROVIDER NOTE - CLINICAL SUMMARY MEDICAL DECISION MAKING FREE TEXT BOX
aml/bmt with sob/cough/fever  hypoxia ro covid v pna., Abx check xr labs, oxygen tba  Juancarlos Street MD, Facep

## 2020-04-22 NOTE — ED ADULT NURSE REASSESSMENT NOTE - NS ED NURSE REASSESS COMMENT FT1
Pt AAOx4, NAD, resting in bed. Pt denies headache, dizziness, chest pain, palpitations, SOB, abd pain, n/v/d, urinary symptoms, fevers, chills, weakness at this time. Levophed running at 0.05mcg/kg/min, /76 w/ MAP of 86. Per Yenifer JAMES, Will titrate levo to half if  OR >.

## 2020-04-22 NOTE — ED PROVIDER NOTE - OBJECTIVE STATEMENT
60y m PMHx AML, bone marrow 60y m PMHx AML, PE, DVT, cardiomyopathy, factor 5 leiden, COLLEEN p/w SOB. As per EMS, pt has been experiencing 3-5 days of SOB and GEORGES. Pt initially called EMS yesterday but RMA'd. Called EMS today for fever tmax 101 and slight confusion, agreed to come to ED. Denies abd pain, chest pain, N/V/D/C, dizziness, LOC, or urinary symptoms. On arrival to ED hypoxic to low 80s on RA, 93% on 10L NR, tachypneic 30s and tachycardic 120s. Nonsmoker.  Oncologist Dr Rosina Gaytan 60y m PMHx AML s/p bone marrow transplant, PE, DVT, cardiomyopathy, factor 5 leiden, COLLEEN p/w SOB. As per EMS, pt has been experiencing 3-5 days of SOB and GEORGES. Pt initially called EMS yesterday but RMA'd. Called EMS today for fever tmax 101 and slight confusion, agreed to come to ED. Denies abd pain, chest pain, N/V/D/C, dizziness, LOC, or urinary symptoms. On arrival to ED hypoxic to low 80s on RA, 93% on 10L NR, tachypneic 30s and tachycardic 120s. Nonsmoker.  Oncologist Dr Rosina Gaytan

## 2020-04-22 NOTE — ED ADULT NURSE NOTE - ED STAT RN HANDOFF DETAILS
Bedside report given to on coming nurse Jacqueline VAN Understands pmh, medications given and plan of care for patient. Patient in stable condition, vital signs updated, has no complaints at this time and has been updated on care plan. Explained to patient that it is change of shift and new nurse is taking over, pt verbalized understanding.

## 2020-04-23 ENCOUNTER — APPOINTMENT (OUTPATIENT)
Dept: HEMATOLOGY ONCOLOGY | Facility: CLINIC | Age: 60
End: 2020-04-23

## 2020-04-23 DIAGNOSIS — I26.99 OTHER PULMONARY EMBOLISM WITHOUT ACUTE COR PULMONALE: ICD-10-CM

## 2020-04-23 DIAGNOSIS — A41.9 SEPSIS, UNSPECIFIED ORGANISM: ICD-10-CM

## 2020-04-23 DIAGNOSIS — J96.01 ACUTE RESPIRATORY FAILURE WITH HYPOXIA: ICD-10-CM

## 2020-04-23 DIAGNOSIS — J15.9 UNSPECIFIED BACTERIAL PNEUMONIA: ICD-10-CM

## 2020-04-23 DIAGNOSIS — I50.9 HEART FAILURE, UNSPECIFIED: ICD-10-CM

## 2020-04-23 DIAGNOSIS — C92.00 ACUTE MYELOBLASTIC LEUKEMIA, NOT HAVING ACHIEVED REMISSION: ICD-10-CM

## 2020-04-23 DIAGNOSIS — Z29.9 ENCOUNTER FOR PROPHYLACTIC MEASURES, UNSPECIFIED: ICD-10-CM

## 2020-04-23 LAB
ANION GAP SERPL CALC-SCNC: 14 MMOL/L — SIGNIFICANT CHANGE UP (ref 5–17)
BASOPHILS # BLD AUTO: 0.01 K/UL — SIGNIFICANT CHANGE UP (ref 0–0.2)
BASOPHILS NFR BLD AUTO: 0.1 % — SIGNIFICANT CHANGE UP (ref 0–2)
BUN SERPL-MCNC: 25 MG/DL — HIGH (ref 7–23)
CALCIUM SERPL-MCNC: 9.2 MG/DL — SIGNIFICANT CHANGE UP (ref 8.4–10.5)
CHLORIDE SERPL-SCNC: 102 MMOL/L — SIGNIFICANT CHANGE UP (ref 96–108)
CO2 SERPL-SCNC: 22 MMOL/L — SIGNIFICANT CHANGE UP (ref 22–31)
CREAT SERPL-MCNC: 1.13 MG/DL — SIGNIFICANT CHANGE UP (ref 0.5–1.3)
CRP SERPL-MCNC: 34.77 MG/DL — HIGH (ref 0–0.4)
CULTURE RESULTS: NO GROWTH — SIGNIFICANT CHANGE UP
EOSINOPHIL # BLD AUTO: 0 K/UL — SIGNIFICANT CHANGE UP (ref 0–0.5)
EOSINOPHIL NFR BLD AUTO: 0 % — SIGNIFICANT CHANGE UP (ref 0–6)
FERRITIN SERPL-MCNC: 3032 NG/ML — HIGH (ref 30–400)
GLUCOSE SERPL-MCNC: 166 MG/DL — HIGH (ref 70–99)
HCT VFR BLD CALC: 26.1 % — LOW (ref 39–50)
HGB BLD-MCNC: 7.9 G/DL — LOW (ref 13–17)
IMM GRANULOCYTES NFR BLD AUTO: 0.6 % — SIGNIFICANT CHANGE UP (ref 0–1.5)
LEGIONELLA AG UR QL: NEGATIVE — SIGNIFICANT CHANGE UP
LYMPHOCYTES # BLD AUTO: 25.2 % — SIGNIFICANT CHANGE UP (ref 13–44)
LYMPHOCYTES # BLD AUTO: 3.45 K/UL — HIGH (ref 1–3.3)
MAGNESIUM SERPL-MCNC: 1.7 MG/DL — SIGNIFICANT CHANGE UP (ref 1.6–2.6)
MCHC RBC-ENTMCNC: 30.3 GM/DL — LOW (ref 32–36)
MCHC RBC-ENTMCNC: 37.3 PG — HIGH (ref 27–34)
MCV RBC AUTO: 123.1 FL — HIGH (ref 80–100)
MONOCYTES # BLD AUTO: 0.62 K/UL — SIGNIFICANT CHANGE UP (ref 0–0.9)
MONOCYTES NFR BLD AUTO: 4.5 % — SIGNIFICANT CHANGE UP (ref 2–14)
MRSA PCR RESULT.: SIGNIFICANT CHANGE UP
NEUTROPHILS # BLD AUTO: 9.54 K/UL — HIGH (ref 1.8–7.4)
NEUTROPHILS NFR BLD AUTO: 69.6 % — SIGNIFICANT CHANGE UP (ref 43–77)
NRBC # BLD: 0 /100 WBCS — SIGNIFICANT CHANGE UP (ref 0–0)
NT-PROBNP SERPL-SCNC: HIGH PG/ML (ref 0–300)
PHOSPHATE SERPL-MCNC: 5.3 MG/DL — HIGH (ref 2.5–4.5)
PLATELET # BLD AUTO: 33 K/UL — LOW (ref 150–400)
POTASSIUM SERPL-MCNC: 4.7 MMOL/L — SIGNIFICANT CHANGE UP (ref 3.5–5.3)
POTASSIUM SERPL-SCNC: 4.7 MMOL/L — SIGNIFICANT CHANGE UP (ref 3.5–5.3)
RBC # BLD: 2.12 M/UL — LOW (ref 4.2–5.8)
RBC # FLD: 18 % — HIGH (ref 10.3–14.5)
S AUREUS DNA NOSE QL NAA+PROBE: SIGNIFICANT CHANGE UP
SARS-COV-2 RNA SPEC QL NAA+PROBE: SIGNIFICANT CHANGE UP
SODIUM SERPL-SCNC: 138 MMOL/L — SIGNIFICANT CHANGE UP (ref 135–145)
SPECIMEN SOURCE: SIGNIFICANT CHANGE UP
TACROLIMUS SERPL-MCNC: 12.6 NG/ML — SIGNIFICANT CHANGE UP
WBC # BLD: 13.7 K/UL — HIGH (ref 3.8–10.5)
WBC # FLD AUTO: 13.7 K/UL — HIGH (ref 3.8–10.5)

## 2020-04-23 PROCEDURE — 99233 SBSQ HOSP IP/OBS HIGH 50: CPT | Mod: GC

## 2020-04-23 PROCEDURE — 99223 1ST HOSP IP/OBS HIGH 75: CPT | Mod: GC

## 2020-04-23 PROCEDURE — 99223 1ST HOSP IP/OBS HIGH 75: CPT

## 2020-04-23 RX ORDER — SODIUM CHLORIDE 0.65 %
1 AEROSOL, SPRAY (ML) NASAL THREE TIMES A DAY
Refills: 0 | Status: DISCONTINUED | OUTPATIENT
Start: 2020-04-23 | End: 2020-04-29

## 2020-04-23 RX ORDER — TAMSULOSIN HYDROCHLORIDE 0.4 MG/1
0.4 CAPSULE ORAL AT BEDTIME
Refills: 0 | Status: DISCONTINUED | OUTPATIENT
Start: 2020-04-23 | End: 2020-04-29

## 2020-04-23 RX ORDER — TACROLIMUS 5 MG/1
0.5 CAPSULE ORAL DAILY
Refills: 0 | Status: DISCONTINUED | OUTPATIENT
Start: 2020-04-23 | End: 2020-04-29

## 2020-04-23 RX ORDER — METOPROLOL TARTRATE 50 MG
12.5 TABLET ORAL
Qty: 0 | Refills: 0 | DISCHARGE

## 2020-04-23 RX ORDER — ATOVAQUONE 750 MG/5ML
750 SUSPENSION ORAL
Refills: 0 | Status: DISCONTINUED | OUTPATIENT
Start: 2020-04-23 | End: 2020-04-29

## 2020-04-23 RX ORDER — POLYMYXIN B SULF/TRIMETHOPRIM 10000-1/ML
1 DROPS OPHTHALMIC (EYE)
Refills: 0 | Status: DISCONTINUED | OUTPATIENT
Start: 2020-04-23 | End: 2020-04-29

## 2020-04-23 RX ORDER — HYDROXYCHLOROQUINE SULFATE 200 MG
TABLET ORAL
Refills: 0 | Status: DISCONTINUED | OUTPATIENT
Start: 2020-04-23 | End: 2020-04-24

## 2020-04-23 RX ORDER — VORICONAZOLE 10 MG/ML
200 INJECTION, POWDER, LYOPHILIZED, FOR SOLUTION INTRAVENOUS EVERY 12 HOURS
Refills: 0 | Status: DISCONTINUED | OUTPATIENT
Start: 2020-04-23 | End: 2020-04-29

## 2020-04-23 RX ORDER — ACYCLOVIR SODIUM 500 MG
400 VIAL (EA) INTRAVENOUS
Refills: 0 | Status: DISCONTINUED | OUTPATIENT
Start: 2020-04-23 | End: 2020-04-29

## 2020-04-23 RX ORDER — HYDROXYCHLOROQUINE SULFATE 200 MG
800 TABLET ORAL EVERY 24 HOURS
Refills: 0 | Status: COMPLETED | OUTPATIENT
Start: 2020-04-23 | End: 2020-04-23

## 2020-04-23 RX ORDER — FINASTERIDE 5 MG/1
5 TABLET, FILM COATED ORAL DAILY
Refills: 0 | Status: DISCONTINUED | OUTPATIENT
Start: 2020-04-23 | End: 2020-04-29

## 2020-04-23 RX ORDER — PANTOPRAZOLE SODIUM 20 MG/1
40 TABLET, DELAYED RELEASE ORAL
Refills: 0 | Status: DISCONTINUED | OUTPATIENT
Start: 2020-04-23 | End: 2020-04-29

## 2020-04-23 RX ORDER — VANCOMYCIN HCL 1 G
1000 VIAL (EA) INTRAVENOUS EVERY 12 HOURS
Refills: 0 | Status: DISCONTINUED | OUTPATIENT
Start: 2020-04-23 | End: 2020-04-25

## 2020-04-23 RX ORDER — URSODIOL 250 MG/1
300 TABLET, FILM COATED ORAL
Refills: 0 | Status: DISCONTINUED | OUTPATIENT
Start: 2020-04-23 | End: 2020-04-29

## 2020-04-23 RX ORDER — HYDROXYCHLOROQUINE SULFATE 200 MG
400 TABLET ORAL EVERY 24 HOURS
Refills: 0 | Status: DISCONTINUED | OUTPATIENT
Start: 2020-04-24 | End: 2020-04-24

## 2020-04-23 RX ORDER — CEFEPIME 1 G/1
2000 INJECTION, POWDER, FOR SOLUTION INTRAMUSCULAR; INTRAVENOUS EVERY 12 HOURS
Refills: 0 | Status: DISCONTINUED | OUTPATIENT
Start: 2020-04-23 | End: 2020-04-28

## 2020-04-23 RX ORDER — SERTRALINE 25 MG/1
50 TABLET, FILM COATED ORAL DAILY
Refills: 0 | Status: DISCONTINUED | OUTPATIENT
Start: 2020-04-23 | End: 2020-04-29

## 2020-04-23 RX ORDER — AZITHROMYCIN 500 MG/1
500 TABLET, FILM COATED ORAL EVERY 24 HOURS
Refills: 0 | Status: DISCONTINUED | OUTPATIENT
Start: 2020-04-23 | End: 2020-04-23

## 2020-04-23 RX ORDER — FOLIC ACID 0.8 MG
1 TABLET ORAL DAILY
Refills: 0 | Status: DISCONTINUED | OUTPATIENT
Start: 2020-04-23 | End: 2020-04-29

## 2020-04-23 RX ADMIN — VORICONAZOLE 200 MILLIGRAM(S): 10 INJECTION, POWDER, LYOPHILIZED, FOR SOLUTION INTRAVENOUS at 05:36

## 2020-04-23 RX ADMIN — Medication 1 APPLICATION(S): at 17:14

## 2020-04-23 RX ADMIN — CEFEPIME 100 MILLIGRAM(S): 1 INJECTION, POWDER, FOR SOLUTION INTRAMUSCULAR; INTRAVENOUS at 05:35

## 2020-04-23 RX ADMIN — Medication 400 MILLIGRAM(S): at 17:13

## 2020-04-23 RX ADMIN — Medication 1 DROP(S): at 17:13

## 2020-04-23 RX ADMIN — SERTRALINE 50 MILLIGRAM(S): 25 TABLET, FILM COATED ORAL at 11:22

## 2020-04-23 RX ADMIN — ATOVAQUONE 750 MILLIGRAM(S): 750 SUSPENSION ORAL at 17:13

## 2020-04-23 RX ADMIN — VORICONAZOLE 200 MILLIGRAM(S): 10 INJECTION, POWDER, LYOPHILIZED, FOR SOLUTION INTRAVENOUS at 17:13

## 2020-04-23 RX ADMIN — Medication 250 MILLIGRAM(S): at 21:58

## 2020-04-23 RX ADMIN — Medication 1 MILLIGRAM(S): at 11:22

## 2020-04-23 RX ADMIN — Medication 1 TABLET(S): at 11:22

## 2020-04-23 RX ADMIN — Medication 400 MILLIGRAM(S): at 05:36

## 2020-04-23 RX ADMIN — PANTOPRAZOLE SODIUM 40 MILLIGRAM(S): 20 TABLET, DELAYED RELEASE ORAL at 05:36

## 2020-04-23 RX ADMIN — URSODIOL 300 MILLIGRAM(S): 250 TABLET, FILM COATED ORAL at 17:57

## 2020-04-23 RX ADMIN — Medication 800 MILLIGRAM(S): at 17:13

## 2020-04-23 RX ADMIN — Medication 1 DROP(S): at 05:36

## 2020-04-23 RX ADMIN — Medication 10 MILLIGRAM(S): at 05:36

## 2020-04-23 RX ADMIN — FINASTERIDE 5 MILLIGRAM(S): 5 TABLET, FILM COATED ORAL at 11:22

## 2020-04-23 RX ADMIN — URSODIOL 300 MILLIGRAM(S): 250 TABLET, FILM COATED ORAL at 05:36

## 2020-04-23 RX ADMIN — Medication 250 MILLIGRAM(S): at 10:19

## 2020-04-23 RX ADMIN — TACROLIMUS 0.5 MILLIGRAM(S): 5 CAPSULE ORAL at 11:22

## 2020-04-23 RX ADMIN — TAMSULOSIN HYDROCHLORIDE 0.4 MILLIGRAM(S): 0.4 CAPSULE ORAL at 21:57

## 2020-04-23 RX ADMIN — ATOVAQUONE 750 MILLIGRAM(S): 750 SUSPENSION ORAL at 05:36

## 2020-04-23 RX ADMIN — CEFEPIME 100 MILLIGRAM(S): 1 INJECTION, POWDER, FOR SOLUTION INTRAMUSCULAR; INTRAVENOUS at 17:13

## 2020-04-23 NOTE — H&P ADULT - NSHPREVIEWOFSYSTEMS_GEN_ALL_CORE
REVIEW OF SYSTEMS:    CONSTITUTIONAL: +chills +fevers +weakness +fatigue  EYES: no blurry vision or eye pain.   ENT: No throat pain. No dysphagia.    NECK: No pain or stiffness  RESPIRATORY: +cough +SOB, No wheezing, hemoptysis   CARDIOVASCULAR: No chest pain or palpitations.  GASTROINTESTINAL: No abdominal pain. No nausea or vomiting; No diarrhea or constipation. No melena or hematochezia.  GENITOURINARY: No dysuria, frequency or hematuria  NEUROLOGICAL: No numbness or weakness. No dizziness or falls.   SKIN: No itching, burning, rashes, or lesions.   LYMPHATIC: No masses or swelling.   All other review of systems is negative unless indicated above.

## 2020-04-23 NOTE — H&P ADULT - PROBLEM SELECTOR PLAN 1
Pt with sepsis likely 2/2 PNA. Tachycardic, +leukocytosis, elevated lactate and AMS, since resolved. Briefly required vasopressor support in ED.   - pending Blood cultures x2 and urine culture  - Vanc 1g Q12H, Cefepime 2g Q12H, Azithro Pt with sepsis likely 2/2 PNA. Tachycardic, +leukocytosis, elevated lactate and AMS, since resolved. Briefly required vasopressor support in ED.   - pending Blood cultures x2 and urine culture  - Vanc 1g Q12H, Cefepime 2g Q12H, Azithro  - ID consult in AM given sepsis in SCT patient  - If persistently hypotensive would start Hydrocortisone 100 TID given likely adrenal insufficiency Pt presented with hypoxia, requiring NRB. CXR showing opacities consistent with COVID-19, however COVID-19 swab negative x2. Elevated procalcitonin >2  - Plan as above  - Urine legionella

## 2020-04-23 NOTE — H&P ADULT - PROBLEM SELECTOR PROBLEM 2
Acute respiratory failure with hypoxia Sepsis with encephalopathy without septic shock, due to unspecified organism

## 2020-04-23 NOTE — CONSULT NOTE ADULT - ATTENDING COMMENTS
59 y/o man w/ AML s/p bone marrow transplant 10/2019, DVT/PE (not on AC due to thrombocytopenia), HTN, factor V Leiden and HFrEF (EF 25%) p/w SOB found hypoxic in setting of PNA of unclear source. MICU consulted for hypoxia.  Avoid Cohorting with COVID patients, intermediate probobilty of COVID    RStres dose steroids; if not improved reconsult
60 m with HTN, AML s/p bone marrow transplant 10/2019, DVT/PE (not on AC due to thrombocytopenia),  factor V Leiden and HFrEF, BIBEMS for dyspnea was hypotensive and required levophed by EMS but responded to fluids  here febrile to 100.1, BP in 80s-90s, tachycardic and tachypneic on NRB  leukocytosis 19 improved to 13 after antibiotics  COVID negative x 2  CXR: b/l opacities, R>L    fever, tachycardia, hypotension, leukocytosis, septic shock  hypoxic respiratory failure and b/l pneumonia s/o COVID vs bacterial pneumonia in the setting on AML s/o BMT 10/2019    * f/u the blood cx  * check sputum cx  * repeat another COVID  * f/u the urine legionella  * check nasal MRSA swab  * start hyodroxychloroquine for a 5 day course anyway as there is high suspicion for COVID, monitor the QTC  * c/w vanco 1 q 12 and cefepime 2 q 12  * c/w mepron, voriconazole and acyclovir ppx  * monitor the O2sat    The above assessment and plan was discussed with natanael Faith MD  Pager [phone]  After 5pm and on weekends call [phone]
59 y/o M with PMhx of acute myeloid leukemia s/p haplo identical PSCT 10/2019, DVT/PE (not on AC due to thrombocytopenia), HTN, factor V Leiden and HFrEF presented to the ED for SOB, hypoxia with pulmonary infiltrate on CXR c/w pneumonia, COVID negative; briefly on pressors in ER  Patient's clinical course suspicious for COVID-19 infection, therefore he was started on Hydroxychloroquine; COVID PCR reordered. Airborne isolation  Plan-  Patient immunosuppressed s/p allogeneic PSCT, on Tacrolimus. Agree with treatment for possible bacterial pneumonia with Cefepime/Vanco IV.   Continue Acyclovir, Mepron, Vfend  Continue Prednisone 10 mg po daily.   Continue Tacrolimus, check level daily  Continue Kyle s/p transplant  Continue Folate, MVI, PPI  Continue O2 supplement, monitor O2 sats

## 2020-04-23 NOTE — CONSULT NOTE ADULT - ASSESSMENT
61 y/o M with PMhx of acute myeloid leukemia s/p haplo identical SCT 10/2019, DVT/PE (not on AC due to thrombocytopenia), HTN, factor V Leiden and HFrEF presented to the ED for SOB briefly on pressors, found to have PNA, COVID negative.     #Septic shock   Pt presented with fever tmax 101 at home, confusion, hypoxia, briefly requiring levophed  Full infectious workup pending   Broad spectrum abx  Not neutropenic   Continue ppx medications    #AML s/p haplo identical BMT-   AML dx 7/2019- High risk, s/p HIDAC consolidation  Bone marrow bx 9/24/19 showing AML in remission  Haplo identical BMT from son on in 10/2019  4/2 chimerism 99%  Pending outpatient post BMT marrow  Transfusions PRN- please transfuse for hgb goal >7, plt count >10, or >15 if febrile   Follows with Dr. Gaytan at [facility]    #Factor V Leiden   Factor V reportedly runs in the family   Xarelto on hold while plt count is low, to resume when plt consistently greater than 100K       Kati Bedoya MD  Hematology Oncology Fellow, PGY-4  [facility] Pager: [phone]/ [facility] Pager: [phone] 61 y/o M with PMhx of acute myeloid leukemia s/p haplo identical PSCT 10/2019, DVT/PE (not on AC due to thrombocytopenia), HTN, factor V Leiden and HFrEF presented to the ED for SOB briefly on pressors, found to have PNA, COVID negative.     #Septic shock   Pt presented with fever tmax 101 at home, confusion, hypoxia, briefly requiring levophed in ER  Full infectious workup pending   Broad spectrum abx  Not neutropenic   Continue ppx medications    #AML s/p haplo identical BMT-   AML dx 7/2019- High risk, s/p HIDAC consolidation  Bone marrow bx 9/24/19 showing AML in remission  Haplo identical PSCT from son on in 10/2019  4/2 chimerism 99%  Pending outpatient post transplant marrow  Transfusions PRN- please transfuse for hgb goal >7, plt count >10, or >15 if febrile   Follows with Dr. Gaytan at [facility]    #Factor V Leiden   Factor V reportedly runs in the family   Xarelto on hold while plt count is low, to resume when plt consistently greater than 100K       Kati Bedoya MD  Hematology Oncology Fellow, PGY-4  [facility] Pager: [phone]/ [facility] Pager: [phone]

## 2020-04-23 NOTE — H&P ADULT - PROBLEM SELECTOR PLAN 5
AML s/p SCT 6 months ago c/b GVHD  - c/w chronic prednisone and tacrolimus  - c/w prophylaxis: atovaquone, acyclovir, voriconazole  - Consult pt's oncologist in AM AML s/p SCT 6 months ago c/b GVHD  - c/w chronic prednisone and tacrolimus  - c/w prophylaxis: atovaquone, acyclovir, voriconazole  - Oncology consult

## 2020-04-23 NOTE — H&P ADULT - ASSESSMENT
61 y/o M with PMhx of acute myeloid leukemia s/p bone marrow transplant 10/2019, DVT/PE (not on AC due to thrombocytopenia), HTN, factor V Leiden and HFrEF (EF 25%) presented to the ED for SOB found to be septic due to PNA 61 y/o M with PMhx of acute myeloid leukemia s/p bone marrow transplant 10/2019, DVT/PE (not on AC due to thrombocytopenia), HTN, factor V Leiden and HFrEF (EF 25%) presented to the ED for SOB found to have hypoxic respiratory failure with sepsis due to PNA.

## 2020-04-23 NOTE — H&P ADULT - NSHPLABSRESULTS_GEN_ALL_CORE
9.6    19.34 )-----------( 49       ( 2020 17:50 )             31.3         137  |  97  |  27<H>  ----------------------------<  147<H>  4.9   |  23  |  1.28    Ca    10.0      2020 17:50    TPro  5.8<L>  /  Alb  4.0  /  TBili  0.7  /  DBili  x   /  AST  16  /  ALT  12  /  AlkPhos  96      D-Dimer 1662    CRP 34.77    Pro-gerald 2.09    Blood Gas Profile - Venous (20 @ 17:50)    pH, Venous: 7.39    pCO2, Venous: 45 mmHg    pO2, Venous: 24 mmHg    HCO3, Venous: 27 mmol/L    Base Excess, Venous: 2.0 mmol/L    Oxygen Saturation, Venous: 35 %    Total CO2, Venous: 28 mmol/L    Blood Gas Source Venous: Venous    Urinalysis Basic - ( 2020 18:42 )    Color: Light Yellow / Appearance: Clear / S.014 / pH: x  Gluc: x / Ketone: Negative  / Bili: Negative / Urobili: Negative   Blood: x / Protein: Negative / Nitrite: Negative   Leuk Esterase: Negative / RBC: 1 /hpf / WBC 1 /HPF   Sq Epi: x / Non Sq Epi: 0 /hpf / Bacteria: Negative    COVID-19 negative x2    < from: Xray Chest 1 View- PORTABLE-Urgent (20 @ 17:56) >    FINDINGS:   Bilateral hazy opacities, right greater than left. There is no pneumothorax.  The heart size is normal.   No acute bony pathology.    IMPRESSION:   Bilateral nonspecific hazy opacities, right greater than left. Differential includes viral pneumonia secondary to COVID-19.    < end of copied text > EKG, labs, and imaging personally reviewed and interpreted.     EKG: RBBB, sinus tachycardia QTc 599    Labs:              9.6    19.34 )-----------( 49       ( 2020 17:50 )             31.3     137  |  97  |  27<H>  ----------------------------<  147<H>  4.9   |  23  |  1.28    Ca    10.0      2020 17:50    TPro  5.8<L>  /  Alb  4.0  /  TBili  0.7  /  DBili  x   /  AST  16  /  ALT  12  /  AlkPhos  96  -    D-Dimer 1662    CRP 34.77    Pro-gerald 2.09    Blood Gas Profile - Venous (20 @ 17:50)    pH, Venous: 7.39    pCO2, Venous: 45 mmHg    pO2, Venous: 24 mmHg    HCO3, Venous: 27 mmol/L    Base Excess, Venous: 2.0 mmol/L    Oxygen Saturation, Venous: 35 %    Total CO2, Venous: 28 mmol/L    Blood Gas Source Venous: Venous    Urinalysis Basic - ( 2020 18:42 )    Color: Light Yellow / Appearance: Clear / S.014 / pH: x  Gluc: x / Ketone: Negative  / Bili: Negative / Urobili: Negative   Blood: x / Protein: Negative / Nitrite: Negative   Leuk Esterase: Negative / RBC: 1 /hpf / WBC 1 /HPF   Sq Epi: x / Non Sq Epi: 0 /hpf / Bacteria: Negative    COVID-19 negative x2    < from: Xray Chest 1 View- PORTABLE-Urgent (20 @ 17:56) >  FINDINGS:   Bilateral hazy opacities, right greater than left. There is no pneumothorax.  The heart size is normal.   No acute bony pathology.    IMPRESSION:   Bilateral nonspecific hazy opacities, right greater than left. Differential includes viral pneumonia secondary to COVID-19.    Care discussed with other providers: Yes  Consult notes reviewed: Yes  Outpatient and prior records reviewed: Yes

## 2020-04-23 NOTE — H&P ADULT - PROBLEM SELECTOR PLAN 4
Non-ischemic cardiomyopathy (EF 25% 9/19/19) from chemotherapy. Taking Lasix 20 daily, had increased dose due to SOB. s/p 2L in the ED  - holding metoprolol and Lasix hypotension Non-ischemic cardiomyopathy (EF 25% 9/19/19) from chemotherapy. Taking Lasix 20 daily, had increased dose due to SOB. s/p 2L in the ED  - holding metoprolol and Lasix hypotension  - Strict I&Os  - Daily Standing weights

## 2020-04-23 NOTE — CONSULT NOTE ADULT - SUBJECTIVE AND OBJECTIVE BOX
CHIEF COMPLAINT:    HPI:  61 yo man with AML s/p bone marrow transplant, PE, DVT, cardiomyopathy, factor 5 leiden, COLLEEN p/w SOB.   Per EMS,  3-5 days of SOB/GEORGES.   EMS was called yesterday but RMA'd.  Today tmax 101 a/w slight confusion  No abd pain, chest pain, N/V/D/C, dizziness, LOC, or urinary symptoms.   On arrival to ED hypoxic to low 80s on RA, 93% on 10L NR, tachypneic 30s and tachycardic 120s.   Nonsmoker.  Oncologist Dr Rosina Gaytan      PAST MEDICAL & SURGICAL HISTORY:  COLLEEN (obstructive sleep apnea)  Pulmonary embolism  Deep vein thrombosis (DVT)  H/O cardiomyopathy  Factor 5 Leiden mutation, heterozygous  No significant past surgical history: bone marrow transplant      FAMILY HISTORY:  FH: heart attack: father, mother, and brother      SOCIAL HISTORY:  Smoking: __ packs x ___ years  EtOH Use:  Marital Status:  Occupation:  Recent Travel:  Country of Birth:  Advance Directives:    Allergies    No Known Allergies    Intolerances        HOME MEDICATIONS:    REVIEW OF SYSTEMS:  Constitutional:   Eyes:  ENT:  CV:  Resp:  GI:  :  MSK:  Integumentary:  Neurological:  Psychiatric:  Endocrine:  Hematologic/Lymphatic:  Allergic/Immunologic:  [ ] All other systems negative  [ ] Unable to assess ROS because ________    OBJECTIVE:  ICU Vital Signs Last 24 Hrs  T(C): 36.6 (2020 21:46), Max: 37.8 (2020 17:42)  T(F): 97.9 (2020 21:46), Max: 100.1 (2020 17:42)  HR: 102 (2020 00:04) (98 - 122)  BP: 108/86 (2020 00:05) (82/57 - 122/81)  BP(mean): 94 (2020 00:05) (68 - 103)  ABP: --  ABP(mean): --  RR: 25 (2020 00:04) (20 - 36)  SpO2: 96% (2020 00:04) (93% - 100%)        CAPILLARY BLOOD GLUCOSE          PHYSICAL EXAM:  General:   HEENT:   Lymph Nodes:  Neck:   Respiratory:   Cardiovascular:   Abdomen:   Extremities:   Skin:   Neurological:  Psychiatry:    HOSPITAL MEDICATIONS:  MEDICATIONS  (STANDING):  norepinephrine Infusion 0.05 MICROgram(s)/kG/Min (8.5 mL/Hr) IV Continuous <Continuous>    MEDICATIONS  (PRN):      LABS:                        9.6    19.34 )-----------( 49       ( 2020 17:50 )             31.3         137  |  97  |  27<H>  ----------------------------<  147<H>  4.9   |  23  |  1.28    Ca    10.0      2020 17:50    TPro  5.8<L>  /  Alb  4.0  /  TBili  0.7  /  DBili  x   /  AST  16  /  ALT  12  /  AlkPhos  96        Urinalysis Basic - ( 2020 18:42 )    Color: Light Yellow / Appearance: Clear / S.014 / pH: x  Gluc: x / Ketone: Negative  / Bili: Negative / Urobili: Negative   Blood: x / Protein: Negative / Nitrite: Negative   Leuk Esterase: Negative / RBC: 1 /hpf / WBC 1 /HPF   Sq Epi: x / Non Sq Epi: 0 /hpf / Bacteria: Negative        Venous Blood Gas:   @ 17:50  7.39/45/24/27/35  VBG Lactate: 2.1      MICROBIOLOGY:     RADIOLOGY:  [ ] Reviewed and interpreted by me    EKG: CHIEF COMPLAINT:    HPI:    61 y/o man with AML s/p bone marrow transplant 10/2019, DVT/PE (not on AC due to thrombocytopenia), HTN, factor V Leiden and HFrEF p/w SOB x 5 days a/w chills, fatigue and cough. Symptoms initially improved after lasix per recommendation of outpt oncologist (Dr. Rosina Gaytan). Pt was found febrile at home today with Tmax 101.7.    EDVS:Hypoxic to low 80s on RA, 93% on 10L NR, tachypneic 30s and tachycardic 120s    ED gave  - tylenol  - azithromycin  - ceftriaxone 100  - norepi @ 19:00 (dc'd after BP improved with IVF)   - LR 1L, NaCL 500 cc x 2         PAST MEDICAL & SURGICAL HISTORY:  COLLEEN (obstructive sleep apnea)  Pulmonary embolism  Deep vein thrombosis (DVT)  H/O cardiomyopathy  Factor 5 Leiden mutation, heterozygous  No significant past surgical history: bone marrow transplant      FAMILY HISTORY:  FH: heart attack: father, mother, and brother      SOCIAL HISTORY:  He lives with his GF   Former smoker  Denies EtOH    Allergies: No Known Allergies    HOME MEDICATIONS (per last discharge summary):     · 	predniSONE 10 mg oral tablet: Last Dose Taken:  , 1 tab(s) orally once a day  · 	folic acid 1 mg oral tablet: Last Dose Taken:  , 1 tab(s) orally once a day  · 	Multiple Vitamins oral tablet: Last Dose Taken:  , 1 tab(s) orally once a day  · 	pantoprazole 40 mg oral delayed release tablet: Last Dose Taken:  , 1 tab(s) orally once a day (before a meal)  · 	atovaquone 750 mg/5 mL oral suspension: Last Dose Taken:  , 5 milliliter(s) orally 2 times a day  · 	tacrolimus 0.5 mg oral capsule: Last Dose Taken:  , 1 cap(s) orally once a day  · 	ursodiol 300 mg oral capsule: Last Dose Taken:  , 1 cap(s) orally 2 times a day (with meals)  · 	ALPRAZolam 0.5 mg oral tablet: Last Dose Taken:  , 1 tab(s) orally once a day (at bedtime)  · 	voriconazole 200 mg oral tablet: Last Dose Taken:  , 1 tab(s) orally every 12 hours  · 	sertraline 50 mg oral tablet: Last Dose Taken:  , 1 tab(s) orally once a day  · 	tamsulosin 0.4 mg oral capsule: Last Dose Taken:  , 1 cap(s) orally once a day (at bedtime)  · 	finasteride 5 mg oral tablet: Last Dose Taken:  , 1 tab(s) orally once a day  · 	metoprolol succinate 25 mg oral tablet, extended release: Last Dose Taken:  , 12.5 milligram(s) orally once a     REVIEW OF SYSTEMS:  CONSTITUTIONAL: +weakness, fevers and chills  EYES: No blindness, no eye pain   ENT:  No throat pain, No rhinorhea   NECK: No pain or stiffness  RESPIRATORY: +cough and SOB Nowheezing, hemoptysis;  CARDIOVASCULAR: No chest pain or palpitations  GASTROINTESTINAL: No abdominal or epigastric pain. No nausea, vomiting, or diarrhea   GENITOURINARY: No dysuria, change in frequency or hematuria  NEUROLOGICAL: No numbness or weakness  SKIN: No itching, burning, rashes, or lesions   PSYCH: No depression or anxiety  All other review of systems is negative unless indicated above.    OBJECTIVE:  ICU Vital Signs Last 24 Hrs  T(C): 36.6 (2020 21:46), Max: 37.8 (2020 17:42)  T(F): 97.9 (2020 21:46), Max: 100.1 (2020 17:42)  HR: 102 (2020 00:04) (98 - 122)  BP: 108/86 (2020 00:05) (82/57 - 122/81)  BP(mean): 94 (2020 00:05) (68 - 103)  RR: 25 (2020 00:04) (20 - 36)  SpO2: 96% (2020 00:04) (93% - 100%)    CAPILLARY BLOOD GLUCOSE    PHYSICAL EXAM:  GENERAL: Mildly distressed in appearance   EYES: EOMI, PERRL  ENT: MMM, no oropharyngeal lesions or erythema appreciated  Pulm: Slight increased WOB, diffuse rales and rhonchi   CV: tachycardic to 105, RR, S1&S2+, no m/r/g appreciated  ABDOMEN: soft, nt, nd, no hepatosplenomegaly  MSK: nl ROM  EXTREMITIES:  no appreciable edema in b/l LE  Neuro: Alert and oriented, no focal deficits  SKIN: warm and dry, no visible rash    HOSPITAL MEDICATIONS:  MEDICATIONS  (STANDING):  norepinephrine Infusion 0.05 MICROgram(s)/kG/Min (8.5 mL/Hr) IV Continuous <Continuous>    MEDICATIONS  (PRN):      LABS:  WBC 19, Hgb 9.6, Plt 49  Na 137, K 4.9, HCO3 23, BUN 27, Cr 1.28 (baseline 1 - 1.2)   Procalcitonin 2.09  pH 7.39  UA negative                        9.6    19.34 )-----------( 49       ( 2020 17:50 )             31.3         137  |  97  |  27<H>  ----------------------------<  147<H>  4.9   |  23  |  1.28    Ca    10.0      2020 17:50    TPro  5.8<L>  /  Alb  4.0  /  TBili  0.7  /  DBili  x   /  AST  16  /  ALT  12  /  AlkPhos  96        Urinalysis Basic - ( 2020 18:42 )    Color: Light Yellow / Appearance: Clear / S.014 / pH: x  Gluc: x / Ketone: Negative  / Bili: Negative / Urobili: Negative   Blood: x / Protein: Negative / Nitrite: Negative   Leuk Esterase: Negative / RBC: 1 /hpf / WBC 1 /HPF   Sq Epi: x / Non Sq Epi: 0 /hpf / Bacteria: Negative        Venous Blood Gas:   @ 17:50  7.39/45/24/27/35  VBG Lactate: 2.1      MICROBIOLOGY:     RADIOLOGY:  [X] Reviewed and interpreted by me  CXR: Bilateral nonspecific hazy opacities, right greater than left.

## 2020-04-23 NOTE — CONSULT NOTE ADULT - SUBJECTIVE AND OBJECTIVE BOX
REASON FOR CONSULTATION: h/o BMT, AML    HPI:    REVIEW OF SYSTEMS:    CONSTITUTIONAL: No weakness, fevers or chills  EYES/ENT: No visual changes;  No vertigo or throat pain   NECK: No pain or stiffness  RESPIRATORY: No cough, wheezing, hemoptysis; No shortness of breath  CARDIOVASCULAR: No chest pain or palpitations  GASTROINTESTINAL: No abdominal or epigastric pain. No nausea, vomiting, or hematemesis; No diarrhea or constipation. No melena or hematochezia.  GENITOURINARY: No dysuria, frequency or hematuria  NEUROLOGICAL: No numbness or weakness  SKIN: No itching, burning, rashes, or lesions   All other review of systems is negative unless indicated above.    Allergies    No Known Allergies    Intolerances        MEDICATIONS  (STANDING):  acyclovir   Oral Tab/Cap 400 milliGRAM(s) Oral two times a day  atovaquone Suspension 750 milliGRAM(s) Oral two times a day  cefepime   IVPB 2000 milliGRAM(s) IV Intermittent every 12 hours  finasteride 5 milliGRAM(s) Oral daily  folic acid 1 milliGRAM(s) Oral daily  multivitamin 1 Tablet(s) Oral daily  pantoprazole    Tablet 40 milliGRAM(s) Oral before breakfast  predniSONE   Tablet 10 milliGRAM(s) Oral daily  sertraline 50 milliGRAM(s) Oral daily  tacrolimus 0.5 milliGRAM(s) Oral daily  tamsulosin 0.4 milliGRAM(s) Oral at bedtime  triamcinolone 0.1% Cream 1 Application(s) Topical two times a day  trimethoprim/polymyxin Solution 1 Drop(s) Both EYES two times a day  ursodiol Capsule 300 milliGRAM(s) Oral two times a day  vancomycin  IVPB 1000 milliGRAM(s) IV Intermittent every 12 hours  voriconazole 200 milliGRAM(s) Oral every 12 hours    MEDICATIONS  (PRN):      Vital Signs Last 24 Hrs  T(C): 36.2 (2020 05:00), Max: 37.8 (2020 17:42)  T(F): 97.2 (2020 05:00), Max: 100.1 (2020 17:42)  HR: 105 (2020 05:00) (88 - 122)  BP: 105/71 (2020 05:00) (82/57 - 122/81)  BP(mean): 74 (2020 01:23) (68 - 103)  RR: 26 (2020 05:00) (20 - 36)  SpO2: 95% (2020 05:00) (93% - 100%)    PHYSICAL EXAM:    General: AOx3, no acute distress  EYES: EOMI, PERRLA, conjunctiva and sclera clear  CHEST/LUNG: Clear to auscultation bilaterally; no wheezes, crackles or rales  HEART: Regular rate and rhythm; no murmurs  ABDOMEN: Soft, Nontender, Nondistended; BS+  LYMPH: No lymphadenopathy noted.  Extremities: No peripheral edema    LABS:                        7.9    13.70 )-----------( 33       ( 2020 06:10 )             26.1     04-22    137  |  97  |  27<H>  ----------------------------<  147<H>  4.9   |  23  |  1.28    Ca    10.0      2020 17:50    TPro  5.8<L>  /  Alb  4.0  /  TBili  0.7  /  DBili  x   /  AST  16  /  ALT  12  /  AlkPhos  96  04-22      Urinalysis Basic - ( 2020 18:42 )    Color: Light Yellow / Appearance: Clear / S.014 / pH: x  Gluc: x / Ketone: Negative  / Bili: Negative / Urobili: Negative   Blood: x / Protein: Negative / Nitrite: Negative   Leuk Esterase: Negative / RBC: 1 /hpf / WBC 1 /HPF   Sq Epi: x / Non Sq Epi: 0 /hpf / Bacteria: Negative            RADIOLOGY & ADDITIONAL STUDIES:    PATHOLOGY: REASON FOR CONSULTATION: h/o BMT, AML    HPI: 61 y/o M with PMhx of 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 Pt states he has had fatigue, SOB and chills for ~5days, but states he is too fatigued to provide further history. As per family pt developed SOB, cough, weakness, and chills Tuesday morning. She called pt's oncologist who recommended lasix, which improved his Sx. Called ambulance as he did not look well and was not getting out of bed. When EMS arrived pt was able to get up, pulse ox reading was 91% and he declined EMS transport. Oncologist started pt on Augmentin and continued lasix. Wednesday pt slept until noon, family checked pulse and was 86%, temp was 101.7. Called EMS who transported pt. Family reported pt was confused and did not know what time of day it was Wednesday stating it was midnight at 1pm. In the ED patient was initially started on levo gtt for septic shock, however BP improved with 2L fluid resuscitation and pt was titrated off of levophed.       REVIEW OF SYSTEMS:    CONSTITUTIONAL: +Fever, +weakness, +chills   EYES/ENT: No visual changes;  No vertigo or throat pain   NECK: No pain or stiffness  RESPIRATORY: +cough  CARDIOVASCULAR: No chest pain or palpitations  GASTROINTESTINAL: No abdominal or epigastric pain. No nausea, vomiting, or hematemesis; No diarrhea or constipation. No melena or hematochezia.  GENITOURINARY: No dysuria, frequency or hematuria  NEUROLOGICAL: No numbness or weakness  SKIN: No itching, burning, rashes, or lesions   All other review of systems is negative unless indicated above.    Allergies    No Known Allergies    Intolerances        MEDICATIONS  (STANDING):  acyclovir   Oral Tab/Cap 400 milliGRAM(s) Oral two times a day  atovaquone Suspension 750 milliGRAM(s) Oral two times a day  cefepime   IVPB 2000 milliGRAM(s) IV Intermittent every 12 hours  finasteride 5 milliGRAM(s) Oral daily  folic acid 1 milliGRAM(s) Oral daily  multivitamin 1 Tablet(s) Oral daily  pantoprazole    Tablet 40 milliGRAM(s) Oral before breakfast  predniSONE   Tablet 10 milliGRAM(s) Oral daily  sertraline 50 milliGRAM(s) Oral daily  tacrolimus 0.5 milliGRAM(s) Oral daily  tamsulosin 0.4 milliGRAM(s) Oral at bedtime  triamcinolone 0.1% Cream 1 Application(s) Topical two times a day  trimethoprim/polymyxin Solution 1 Drop(s) Both EYES two times a day  ursodiol Capsule 300 milliGRAM(s) Oral two times a day  vancomycin  IVPB 1000 milliGRAM(s) IV Intermittent every 12 hours  voriconazole 200 milliGRAM(s) Oral every 12 hours    MEDICATIONS  (PRN):      Vital Signs Last 24 Hrs  T(C): 36.2 (2020 05:00), Max: 37.8 (2020 17:42)  T(F): 97.2 (2020 05:00), Max: 100.1 (2020 17:42)  HR: 105 (2020 05:00) (88 - 122)  BP: 105/71 (2020 05:00) (82/57 - 122/81)  BP(mean): 74 (2020 01:23) (68 - 103)  RR: 26 (2020 05:00) (20 - 36)  SpO2: 95% (2020 05:00) (93% - 100%)    PHYSICAL EXAM:    Not physically examined to decrease COVID exposure to our already immunocompromised patient population    LABS:                        7.9    13.70 )-----------( 33       ( 2020 06:10 )             26.1     04-22    137  |  97  |  27<H>  ----------------------------<  147<H>  4.9   |  23  |  1.28    Ca    10.0      2020 17:50    TPro  5.8<L>  /  Alb  4.0  /  TBili  0.7  /  DBili  x   /  AST  16  /  ALT  12  /  AlkPhos  96  04-22      Urinalysis Basic - ( 2020 18:42 )    Color: Light Yellow / Appearance: Clear / S.014 / pH: x  Gluc: x / Ketone: Negative  / Bili: Negative / Urobili: Negative   Blood: x / Protein: Negative / Nitrite: Negative   Leuk Esterase: Negative / RBC: 1 /hpf / WBC 1 /HPF   Sq Epi: x / Non Sq Epi: 0 /hpf / Bacteria: Negative            RADIOLOGY & ADDITIONAL STUDIES:    < from: Xray Chest 1 View- PORTABLE-Urgent (20 @ 17:56) >    EXAM:  XR CHEST PORTABLE URGENT 1V                            PROCEDURE DATE:  2020            INTERPRETATION:  CLINICAL INDICATION: Shortness of breath.    EXAM: Frontal view of the chest with comparison made to chest radiograph on 3/12/2020    FINDINGS:   Bilateral hazy opacities, right greater than left. There is no pneumothorax.  The heart size is normal.   No acute bony pathology.    IMPRESSION:   Bilateral nonspecific hazy opacities, right greater than left. Differential includes viral pneumonia secondary to COVID-19.                MARGARET ESTRELLA M.D., RADIOLOGY RESIDENT  This document has been electronically signed.  DONAVON NORIEGA M.D., ATTENDING RADIOLOGIST  This document has been electronically signed. 2020  6:13PM        < end of copied text >      PATHOLOGY:

## 2020-04-23 NOTE — CONSULT NOTE ADULT - ASSESSMENT
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 Hypotensive with EMS, requiring levophed but responded to fluids, fever, leukocytosis 19, started on cefepime 2 q12 and vancomycin 1 q12. Blood cx, sputum cx pending. 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 Hypotensive with EMS, requiring levophed but responded to fluids, fever, leukocytosis 19, started on cefepime 2 q12 and vancomycin 1 q12. Blood cx, sputum cx pending.        Respiratory distress - high suspicion for COVID despite 2 negatives   leukocytosis - from hemoconcentration vs superimposed bacterial pneumonia     Suggest:  * c/w non rebreather- patient is noted to be worsening on non rebreather.     check COVID PCR again   * check nasal pcr for staph aureus/MRSA  * c/w vancomycin 1q12 and cefepime 1q12 for now   * check vancomycin levels before the 4rth dose   * check BMP   * f/u blood cx, urine cx   * check sputum cx       Above was discussed with team

## 2020-04-23 NOTE — H&P ADULT - HISTORY OF PRESENT ILLNESS
Mr. 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 Pt states he has had fatigue, SOB and chills for ~5days, but states he is too fatigue to provide further history. As per family pt developed SOB, cough, weakness, and chills Tuesday morning. She called pt's oncologist who recommended lasix, which improved his Sx. Called ambulance as he did not look well and was not getting out of bed. When EMS arrived pt was able to get up, pulse ox reading was 91% and he declined EMS transport. Oncologist started pt on Augmentin and continued lasix. Wednesday pt slept until noon, family checked pulse and was 86%, temp was 101.7. Called EMS who transported pt. Mr. 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 Pt states he has had fatigue, SOB and chills for ~5days, but states he is too fatigue to provide further history. As per family pt developed SOB, cough, weakness, and chills Tuesday morning. She called pt's oncologist who recommended lasix, which improved his Sx. Called ambulance as he did not look well and was not getting out of bed. When EMS arrived pt was able to get up, pulse ox reading was 91% and he declined EMS transport. Oncologist started pt on Augmentin and continued lasix. Wednesday pt slept until noon, family checked pulse and was 86%, temp was 101.7. Called EMS who transported pt. Family reported pt was confused and did not know what time of day it was Wednesday stating it was midnight at 1pm. Mr. 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 Pt states he has had fatigue, SOB and chills for ~5days, but states he is too fatigue to provide further history. As per family pt developed SOB, cough, weakness, and chills Tuesday morning. She called pt's oncologist who recommended lasix, which improved his Sx. Called ambulance as he did not look well and was not getting out of bed. When EMS arrived pt was able to get up, pulse ox reading was 91% and he declined EMS transport. Oncologist started pt on Augmentin and continued lasix. Wednesday pt slept until noon, family checked pulse and was 86%, temp was 101.7. Called EMS who transported pt. Family reported pt was confused and did not know what time of day it was Wednesday stating it was midnight at 1pm. In the ED patient was initially started on levo gtt for septic shock, however BP improved with 2L fluid resuscitation and pt was titrated off of levo Mr. 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 Pt states he has had fatigue, SOB and chills for ~5days, but states he is too fatigue to provide further history. As per family pt developed SOB, cough, weakness, and chills Tuesday morning. She called pt's oncologist who recommended lasix, which improved his Sx. Called ambulance as he did not look well and was not getting out of bed. When EMS arrived pt was able to get up, pulse ox reading was 91% and he declined EMS transport. Oncologist started pt on Augmentin and continued lasix. Wednesday pt slept until noon, family checked pulse and was 86%, temp was 101.7. Called EMS who transported pt. Family reported pt was confused and did not know what time of day it was Wednesday stating it was midnight at 1pm. In the ED patient was initially started on levo gtt for septic shock, however BP improved with 2L fluid resuscitation and pt was titrated off of levophed.

## 2020-04-23 NOTE — H&P ADULT - PROBLEM SELECTOR PLAN 2
Pt presented with hypoxia, requiring NRB. CXR showing opacities consistent with COVID-19, however COVID-19 swab negative x2. Elevated procalcitonin   - Plan as above  - Urine legionella Pt presented with hypoxia, requiring NRB. CXR showing opacities consistent with COVID-19, however COVID-19 swab negative x2. Elevated procalcitonin >2  - Plan as above  - Urine legionella Pt with sepsis likely 2/2 PNA. Tachycardic, +leukocytosis, elevated lactate and AMS, since resolved. Briefly required vasopressor support in ED.   -ID consult in the AM to assist with definitively ruling out COVID or if further testing is needed given significant hypoxia and b/l infiltrates.   - pending Blood cultures x2 and urine culture  - Vanc 1g Q12H, Cefepime 2g Q12H, Azithro  - ID consult in AM given sepsis in SCT patient  - If persistently hypotensive would start Hydrocortisone 100 TID given likely adrenal insufficiency

## 2020-04-23 NOTE — ED ADULT NURSE REASSESSMENT NOTE - NS ED NURSE REASSESS COMMENT FT1
Pt AAOx4, NAD, resting in bed. Pt denies headache, dizziness, chest pain, palpitations, abd pain, n/v/d, urinary symptoms, fevers, chills, weakness at this time. Pt admitted awaiting bed. Safety maintained. BP Stable off levophed.

## 2020-04-23 NOTE — CHART NOTE - NSCHARTNOTEFT_GEN_A_CORE
Pt seen and discussed w ID.  High suspicion of covid despite neg testing;  Will start Plaquenil per protocol, along w coverage for bacterial source of sepsis as outlined.

## 2020-04-23 NOTE — CONSULT NOTE ADULT - SUBJECTIVE AND OBJECTIVE BOX
Patient is a 60y old  Male who presents with a chief complaint of SOB (2020 08:08)    HPI:  Mr. 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 Pt states he has had fatigue, SOB and chills for ~5days, but states he is too fatigue to provide further history. As per family pt developed SOB, cough, weakness, and chills Tuesday morning. She called pt's oncologist who recommended lasix, which improved his Sx. Called ambulance as he did not look well and was not getting out of bed. When EMS arrived pt was able to get up, pulse ox reading was 91% and he declined EMS transport. Oncologist started pt on Augmentin and continued lasix. Wednesday pt slept until noon, family checked pulse and was 86%, temp was 101.7. Called EMS who transported pt. Family reported pt was confused and did not know what time of day it was Wednesday stating it was midnight at 1pm. In the ED patient was initially started on levo gtt for septic shock, however BP improved with 2L fluid resuscitation and pt was titrated off of levophed. (2020 01:51)    Above reviewed.     prior hospital charts reviewed [ x ]  primary team notes reviewed [ x ]  other consultant notes reviewed [ x ]    PAST MEDICAL & SURGICAL HISTORY:  COLLEEN (obstructive sleep apnea)  Pulmonary embolism  Deep vein thrombosis (DVT)  H/O cardiomyopathy  Factor 5 Leiden mutation, heterozygous  No significant past surgical history: bone marrow transplant    Allergies  No Known Allergies    ANTIMICROBIALS (past 90 days)  MEDICATIONS  (STANDING):  acyclovir   Oral Tab/Cap   400 milliGRAM(s) Oral (20 @ 05:36)    atovaquone Suspension   750 milliGRAM(s) Oral (20 @ 05:36)    azithromycin  IVPB   250 mL/Hr IV Intermittent (20 @ 18:36)    cefepime   IVPB   100 mL/Hr IV Intermittent (20 @ 05:35)    cefTRIAXone   IVPB   100 mL/Hr IV Intermittent (20 @ 18:19)    vancomycin  IVPB   250 mL/Hr IV Intermittent (04-22-20 @ 21:23)    voriconazole   200 milliGRAM(s) Oral (20 @ 05:36)    ANTIMICROBIALS:    acyclovir   Oral Tab/Cap 400 two times a day  atovaquone Suspension 750 two times a day  cefepime   IVPB 2000 every 12 hours  vancomycin  IVPB 1000 every 12 hours  voriconazole 200 every 12 hours      OTHER MEDS: MEDICATIONS  (STANDING):  finasteride 5 daily  pantoprazole    Tablet 40 before breakfast  predniSONE   Tablet 10 daily  sertraline 50 daily  tacrolimus 0.5 daily  tamsulosin 0.4 at bedtime  ursodiol Capsule 300 two times a day    SOCIAL HISTORY:  non-smoker,     FAMILY HISTORY:  FH: heart attack: father, mother, and brother      REVIEW OF SYSTEMS  [  ] ROS unobtainable because:    [  ] All other systems negative except as noted below:	    Constitutional:  [ ] fever [ ] chills  [ ] weight loss  [ ] weakness  Skin:  [ ] rash [ ] phlebitis	  Eyes: [ ] icterus [ ] pain  [ ] discharge	  ENMT: [ ] sore throat  [ ] thrush [ ] ulcers [ ] exudates  Respiratory: [ ] dyspnea [ ] hemoptysis [ ] cough [ ] sputum	  Cardiovascular:  [ ] chest pain [ ] palpitations [ ] edema	  Gastrointestinal:  [ ] nausea [ ] vomiting [ ] diarrhea [ ] constipation [ ] pain	  Genitourinary:  [ ] dysuria [ ] frequency [ ] hematuria [ ] discharge [ ] flank pain  [ ] incontinence  Musculoskeletal:  [ ] myalgias [ ] arthralgias [ ] arthritis  [ ] back pain  Neurological:  [ ] headache [ ] seizures  [ ] confusion/altered mental status  Psychiatric:  [ ] anxiety [ ] depression	  Hematology/Lymphatics:  [ ] lymphadenopathy  Endocrine:  [ ] adrenal [ ] thyroid  Allergic/Immunologic:	 [ ] transplant [ ] seasonal    Vital Signs Last 24 Hrs  T(F): 97.2 (20 @ 05:00), Max: 100.1 (20 @ 17:42)    Vital Signs Last 24 Hrs  HR: 105 (20 @ 05:00) (88 - 122)  BP: 105/71 (20 @ 05:00) (82/57 - 122/81)  RR: 26 (20 @ 05:00)  SpO2: 95% (20 @ 09:07) (93% - 100%)  Wt(kg): --    PHYSICAL EXAM:  Constitutional: non-toxic, no distress  HEAD/EYES: anicteric, no conjunctival injection  ENT:  supple, no thrush  Cardiovascular:   normal S1, S2, no murmur, no edema  Respiratory:  clear BS bilaterally, no wheezes, no rales  GI:  soft, non-tender, normal bowel sounds  :  no saucedo, no CVA tenderness  Musculoskeletal:  no synovitis, normal ROM  Neurologic: awake and alert, normal strength, no focal findings  Skin:  no rash, no erythema, no phlebitis  Heme/Onc: no lymphadenopathy   Psychiatric:  awake, alert, appropriate mood                                7.9    13.70 )-----------( 33       ( 2020 06:10 )             26.1     04-    138  |  102  |  25<H>  ----------------------------<  166<H>  4.7   |  22  |  1.13    Ca    9.2      2020 06:09  Phos  5.3     -  Mg     1.7     -    TPro  5.8<L>  /  Alb  4.0  /  TBili  0.7  /  DBili  x   /  AST  16  /  ALT  12  /  AlkPhos  96  04-22      Urinalysis Basic - ( 2020 18:42 )    Color: Light Yellow / Appearance: Clear / S.014 / pH: x  Gluc: x / Ketone: Negative  / Bili: Negative / Urobili: Negative   Blood: x / Protein: Negative / Nitrite: Negative   Leuk Esterase: Negative / RBC: 1 /hpf / WBC 1 /HPF   Sq Epi: x / Non Sq Epi: 0 /hpf / Bacteria: Negative        MICROBIOLOGY:                            RADIOLOGY:  imaging below personally reviewed Patient is a 60y old  Male who presents with a chief complaint of SOB (2020 08:08)    HPI:  Mr. 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 Pt states he has had fatigue, SOB and chills for ~5days, but states he is too fatigue to provide further history. As per family pt developed SOB, cough, weakness, and chills Tuesday morning. She called pt's oncologist who recommended lasix, which improved his Sx. Called ambulance as he did not look well and was not getting out of bed. When EMS arrived pt was able to get up, pulse ox reading was 91% and he declined EMS transport. Oncologist started pt on Augmentin and continued lasix. Wednesday pt slept until noon, family checked pulse and was 86%, temp was 101.7. Called EMS who transported pt. Family reported pt was confused and did not know what time of day it was Wednesday stating it was midnight at 1pm. In the ED patient was initially started on levo gtt for septic shock, however BP improved with 2L fluid resuscitation and pt was titrated off of levophed. (2020 01:51)    Above reviewed. Patient on non rebreather, reports symptoms started 4 days ago, fever, SOB, intermittent diarrhea.   Patient reported he lives with girlfriend, no known sick contacts.   No dysuria.     prior hospital charts reviewed [ x ]  primary team notes reviewed [ x ]  other consultant notes reviewed [ x ]    PAST MEDICAL & SURGICAL HISTORY:  COLLEEN (obstructive sleep apnea)  Pulmonary embolism  Deep vein thrombosis (DVT)  H/O cardiomyopathy  Factor 5 Leiden mutation, heterozygous  No significant past surgical history: bone marrow transplant    Allergies  No Known Allergies    ANTIMICROBIALS (past 90 days)  MEDICATIONS  (STANDING):  acyclovir   Oral Tab/Cap   400 milliGRAM(s) Oral (20 @ 05:36)    atovaquone Suspension   750 milliGRAM(s) Oral (20 @ 05:36)    azithromycin  IVPB   250 mL/Hr IV Intermittent (20 @ 18:36)    cefepime   IVPB   100 mL/Hr IV Intermittent (20 @ 05:35)    cefTRIAXone   IVPB   100 mL/Hr IV Intermittent (20 @ 18:19)    vancomycin  IVPB   250 mL/Hr IV Intermittent (20 @ 21:23)    voriconazole   200 milliGRAM(s) Oral (20 @ 05:36)    ANTIMICROBIALS:    acyclovir   Oral Tab/Cap 400 two times a day  atovaquone Suspension 750 two times a day  cefepime   IVPB 2000 every 12 hours  vancomycin  IVPB 1000 every 12 hours  voriconazole 200 every 12 hours      OTHER MEDS: MEDICATIONS  (STANDING):  finasteride 5 daily  pantoprazole    Tablet 40 before breakfast  predniSONE   Tablet 10 daily  sertraline 50 daily  tacrolimus 0.5 daily  tamsulosin 0.4 at bedtime  ursodiol Capsule 300 two times a day    SOCIAL HISTORY:  non-smoker, no alcohol use     FAMILY HISTORY:  FH: heart attack: father, mother, and brother      REVIEW OF SYSTEMS  [  ] ROS unobtainable because:    [ x ] All other systems negative except as noted below:	    Constitutional:  [x ] fever [ ] chills  [ ] weight loss  [ ] weakness  Skin:  [ ] rash [ ] phlebitis	  Eyes: [ ] icterus [ ] pain  [ ] discharge	  ENMT: [ ] sore throat  [ ] thrush [ ] ulcers [ ] exudates  Respiratory: [x ] dyspnea [ ] hemoptysis [ ] cough [ ] sputum	  Cardiovascular:  [ ] chest pain [ ] palpitations [ ] edema	  Gastrointestinal:  [ ] nausea [ ] vomiting [ ] diarrhea [ ] constipation [ ] pain	  Genitourinary:  [ ] dysuria [ ] frequency [ ] hematuria [ ] discharge [ ] flank pain  [ ] incontinence  Musculoskeletal:  [ ] myalgias [ ] arthralgias [ ] arthritis  [ ] back pain  Neurological:  [ ] headache [ ] seizures  [ ] confusion/altered mental status  Psychiatric:  [ ] anxiety [ ] depression	  Hematology/Lymphatics:  [ ] lymphadenopathy  Endocrine:  [ ] adrenal [ ] thyroid  Allergic/Immunologic:	 [ ] transplant [ ] seasonal    Vital Signs Last 24 Hrs  T(F): 97.2 (20 @ 05:00), Max: 100.1 (20 @ 17:42)    Vital Signs Last 24 Hrs  HR: 105 (20 @ 05:00) (88 - 122)  BP: 105/71 (20 @ 05:00) (82/57 - 122/81)  RR: 26 (20 @ 05:00)  SpO2: 95% (20 @ 09:07) (93% - 100%)  Wt(kg): --    PHYSICAL EXAM:  Constitutional: on non rebreather, severe distress   HEAD/EYES: anicteric, no conjunctival injection  ENT:  supple, no thrush  Cardiovascular: normal S1, S2, no murmur, no edema  Respiratory:  clear BS bilaterally, no wheezes, no rales  GI:  soft, non-tender, normal bowel sounds  :  no saucedo, no CVA tenderness  Musculoskeletal:  no synovitis, normal ROM  Neurologic:  no focal findings  Skin:  no rash, no erythema, no phlebitis  Heme/Onc: no lymphadenopathy   Psychiatric:  awake, alert                            7.9    13.70 )-----------( 33       ( 2020 06:10 )             26.1     -    138  |  102  |  25<H>  ----------------------------<  166<H>  4.7   |  22  |  1.13    Ca    9.2      2020 06:09  Phos  5.3     -  Mg     1.7         TPro  5.8<L>  /  Alb  4.0  /  TBili  0.7  /  DBili  x   /  AST  16  /  ALT  12  /  AlkPhos  96  -      Urinalysis Basic - ( 2020 18:42 )    Color: Light Yellow / Appearance: Clear / S.014 / pH: x  Gluc: x / Ketone: Negative  / Bili: Negative / Urobili: Negative   Blood: x / Protein: Negative / Nitrite: Negative   Leuk Esterase: Negative / RBC: 1 /hpf / WBC 1 /HPF   Sq Epi: x / Non Sq Epi: 0 /hpf / Bacteria: Negative      MICROBIOLOGY:  Blood cx - pending   Urine cx - pending       RADIOLOGY:< from: Xray Chest 1 View- PORTABLE-Urgent (20 @ 17:56) >  IMPRESSION:   Bilateral nonspecific hazy opacities, right greater than left. Differential includes viral pneumonia secondary to COVID-19. Patient is a 60y old  Male who presents with a chief complaint of SOB (2020 08:08)    HPI:  Mr. 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 Pt states he has had fatigue, SOB and chills for ~5days, but states he is too fatigue to provide further history. As per family pt developed SOB, cough, weakness, and chills Tuesday morning. She called pt's oncologist who recommended lasix, which improved his Sx. Called ambulance as he did not look well and was not getting out of bed. When EMS arrived pt was able to get up, pulse ox reading was 91% and he declined EMS transport. Oncologist started pt on Augmentin and continued lasix. Wednesday pt slept until noon, family checked pulse and was 86%, temp was 101.7. Called EMS who transported pt. Family reported pt was confused and did not know what time of day it was Wednesday stating it was midnight at 1pm. In the ED patient was initially started on levo gtt for septic shock, however BP improved with 2L fluid resuscitation and pt was titrated off of levophed. (2020 01:51)    Above reviewed. Patient on non rebreather, reports symptoms started 4 days ago, fever, SOB, intermittent diarrhea.   Patient reported he lives with girlfriend, no known sick contacts.   No dysuria.     prior hospital charts reviewed [ x ]  primary team notes reviewed [ x ]  other consultant notes reviewed [ x ]    PAST MEDICAL & SURGICAL HISTORY:  COLLEEN (obstructive sleep apnea)  Pulmonary embolism  Deep vein thrombosis (DVT)  H/O cardiomyopathy  Factor 5 Leiden mutation, heterozygous  No significant past surgical history: bone marrow transplant    Allergies  No Known Allergies    ANTIMICROBIALS (past 90 days)  MEDICATIONS  (STANDING):  acyclovir   Oral Tab/Cap   400 milliGRAM(s) Oral (20 @ 05:36)    atovaquone Suspension   750 milliGRAM(s) Oral (20 @ 05:36)    azithromycin  IVPB   250 mL/Hr IV Intermittent (20 @ 18:36)    cefepime   IVPB   100 mL/Hr IV Intermittent (20 @ 05:35)    cefTRIAXone   IVPB   100 mL/Hr IV Intermittent (20 @ 18:19)    vancomycin  IVPB   250 mL/Hr IV Intermittent (20 @ 21:23)    voriconazole   200 milliGRAM(s) Oral (20 @ 05:36)    ANTIMICROBIALS:    acyclovir   Oral Tab/Cap 400 two times a day  atovaquone Suspension 750 two times a day  cefepime   IVPB 2000 every 12 hours  vancomycin  IVPB 1000 every 12 hours  voriconazole 200 every 12 hours      OTHER MEDS: MEDICATIONS  (STANDING):  finasteride 5 daily  pantoprazole    Tablet 40 before breakfast  predniSONE   Tablet 10 daily  sertraline 50 daily  tacrolimus 0.5 daily  tamsulosin 0.4 at bedtime  ursodiol Capsule 300 two times a day    SOCIAL HISTORY:  lives with his girlfriend, former smoker  no alcohol or drug abuse  no recent travel    FAMILY HISTORY:  FH: heart attack: father, mother, and brother      REVIEW OF SYSTEMS  [  ] ROS unobtainable because:    [ x ] All other systems negative except as noted below:	    Constitutional:  [x ] fever [ ] chills  [ ] weight loss  [ ] weakness  Skin:  [ ] rash [ ] phlebitis	  Eyes: [ ] icterus [ ] pain  [ ] discharge	  ENMT: [ ] sore throat  [ ] thrush [ ] ulcers [ ] exudates  Respiratory: [x ] dyspnea [ ] hemoptysis [ ] cough [ ] sputum	  Cardiovascular:  [ ] chest pain [ ] palpitations [ ] edema	  Gastrointestinal:  [ ] nausea [ ] vomiting [x] diarrhea [ ] constipation [ ] pain	  Genitourinary:  [ ] dysuria [ ] frequency [ ] hematuria [ ] discharge [ ] flank pain  [ ] incontinence  Musculoskeletal:  [ ] myalgias [ ] arthralgias [ ] arthritis  [ ] back pain  Neurological:  [ ] headache [ ] seizures  [ ] confusion/altered mental status  Psychiatric:  [ ] anxiety [ ] depression	  Hematology/Lymphatics:  [ ] lymphadenopathy  Endocrine:  [ ] adrenal [ ] thyroid  Allergic/Immunologic:	 [ ] transplant [ ] seasonal    Vital Signs Last 24 Hrs  T(F): 97.2 (20 @ 05:00), Max: 100.1 (20 @ 17:42)    Vital Signs Last 24 Hrs  HR: 105 (20 @ 05:00) (88 - 122)  BP: 105/71 (20 @ 05:00) (82/57 - 122/81)  RR: 26 (20 @ 05:00)  SpO2: 95% (20 @ 09:07) (93% - 100%)  Wt(kg): --    PHYSICAL EXAM:  Constitutional: on non rebreather, severe distress   HEAD: normocephalic, atraumatic  EYES: anicteric, no conjunctival injection  ENT:  supple, no thrush  Cardio: normal S1, S2  Respiratory:  tachypneic with mild respiratory distress on NRB  GI:  soft, non-tender, normal bowel sounds  :  no saucedo, no CVA tenderness  Musculoskeletal:  no synovitis, normal ROM, s/o R great toe partial amp  Neurologic:  no focal findings  Skin:  no rash, no erythema, no phlebitis  Heme/Onc: no lymphadenopathy   Psychiatric:  awake, alert, normal affect                            7.9    13.70 )-----------( 33       ( 2020 06:10 )             26.1         138  |  102  |  25<H>  ----------------------------<  166<H>  4.7   |  22  |  1.13    Ca    9.2      2020 06:09  Phos  5.3       Mg     1.7         TPro  5.8<L>  /  Alb  4.0  /  TBili  0.7  /  DBili  x   /  AST  16  /  ALT  12  /  AlkPhos  96        Urinalysis Basic - ( 2020 18:42 )    Color: Light Yellow / Appearance: Clear / S.014 / pH: x  Gluc: x / Ketone: Negative  / Bili: Negative / Urobili: Negative   Blood: x / Protein: Negative / Nitrite: Negative   Leuk Esterase: Negative / RBC: 1 /hpf / WBC 1 /HPF   Sq Epi: x / Non Sq Epi: 0 /hpf / Bacteria: Negative      MICROBIOLOGY:  Blood cx - pending   Urine cx - pending       RADIOLOGY:< from: Xray Chest 1 View- PORTABLE-Urgent (20 @ 17:56) >  IMPRESSION:   Bilateral nonspecific hazy opacities, right greater than left. Differential includes viral pneumonia secondary to COVID-19.

## 2020-04-23 NOTE — H&P ADULT - NSHPPHYSICALEXAM_GEN_ALL_CORE
Vital Signs Last 24 Hrs  T(C): 36.4 (04-23-20 @ 02:16)  T(F): 97.6 (04-23-20 @ 02:16), Max: 100.1 (04-22-20 @ 17:42)  HR: 88 (04-23-20 @ 02:16) (88 - 122)  BP: 117/72 (04-23-20 @ 02:16)  BP(mean): 74 (04-23-20 @ 01:23) (68 - 103)  RR: 25 (04-23-20 @ 02:16) (20 - 36)  SpO2: 96% (04-23-20 @ 02:16) (93% - 100%)  Wt(kg): --    PHYSICAL EXAM:  GENERAL: NAD, well-developed, fatigued appearing, sleepy  HEAD:  Atraumatic, Normocephalic  EYES: EOMI, PERRLA, conjunctiva and sclera clear  NECK: Supple, No JVD  CHEST/LUNG: decreased air entry b/l, no wheezing  HEART: Regular rate and rhythm; No murmurs, rubs, or gallops  ABDOMEN: Soft, Nontender, Nondistended; Bowel sounds present  EXTREMITIES:  2+ Peripheral Pulses, No clubbing, cyanosis, or edema  PSYCH: AAOx3  NEUROLOGY: non-focal, falls asleep mid-conversation but can be aroused  SKIN: No rashes or lesions

## 2020-04-23 NOTE — H&P ADULT - ATTENDING COMMENTS
Patient seen and examined at bedside. Agree with resident note above. Edited where appropriate.    60M PMH AML s/p BM transplant 6 months ago, multiple DVT/PE not on A/C due to thrombocytopenia, Factor V leiden, chronic HFrEF p/w SOB, chills, and fatigue, admitted with hypoxic respiratory failure, sepsis, multifocal PNA. Patient with b/l infiltrates and profound hypoxia, so despite two negative COVID swabs, would think it prudent to leave isolation precautions for COVID in place for staff protection until he can be evaluated by Infectious Disease and definitively rule out in the morning. As per ICU and Heme/Bone marrow, the patient should remain on a non-COVID unit for his protection to prevent nosocomial COVID infection if he truly is negative, and I agree with this. C/w NRB mask, satting 96% on 10L. Cover with broad spectrum antibiotics for bacterial PNA (discontinued azithromycin as QTc is prolonged). Meeting sepsis criteria. Follow up on cultures. Did not appreciate significant crackles on exam and given initial hypotension and fluid resuscitation, will hold off on lasix. Check proBNP.

## 2020-04-23 NOTE — H&P ADULT - PROBLEM SELECTOR PROBLEM 1
Sepsis with encephalopathy without septic shock, due to unspecified organism Acute respiratory failure with hypoxia

## 2020-04-23 NOTE — CONSULT NOTE ADULT - ASSESSMENT
61 y/o man w/ AML s/p bone marrow transplant 10/2019, DVT/PE (not on AC due to thrombocytopenia), HTN, factor V Leiden and HFrEF (EF 25%) p/w SOB found hypoxic in setting of PNA of unclear source. MICU consulted for hypoxia.     Recommendations:   - Recommend treating for lobar PNA (COVID PCR negative x 2)   - Recommend starting stress dose steroids given chronic steroid use.   - No longer requiring pressors, if hypotension persists would consider workup for adrenal insufficiency    Plan discussed with Dr. Melamud Mark Hellerman PGY2, MICU consult resident

## 2020-04-24 ENCOUNTER — RX RENEWAL (OUTPATIENT)
Age: 60
End: 2020-04-24

## 2020-04-24 DIAGNOSIS — Z94.84 STEM CELLS TRANSPLANT STATUS: ICD-10-CM

## 2020-04-24 DIAGNOSIS — J12.9 VIRAL PNEUMONIA, UNSPECIFIED: ICD-10-CM

## 2020-04-24 LAB
ALBUMIN SERPL ELPH-MCNC: 3.3 G/DL — SIGNIFICANT CHANGE UP (ref 3.3–5)
ALP SERPL-CCNC: 81 U/L — SIGNIFICANT CHANGE UP (ref 40–120)
ALT FLD-CCNC: 9 U/L — LOW (ref 10–45)
ANION GAP SERPL CALC-SCNC: 12 MMOL/L — SIGNIFICANT CHANGE UP (ref 5–17)
AST SERPL-CCNC: 14 U/L — SIGNIFICANT CHANGE UP (ref 10–40)
BASOPHILS # BLD AUTO: 0.01 K/UL — SIGNIFICANT CHANGE UP (ref 0–0.2)
BASOPHILS NFR BLD AUTO: 0.1 % — SIGNIFICANT CHANGE UP (ref 0–2)
BILIRUB SERPL-MCNC: 0.6 MG/DL — SIGNIFICANT CHANGE UP (ref 0.2–1.2)
BUN SERPL-MCNC: 23 MG/DL — SIGNIFICANT CHANGE UP (ref 7–23)
CALCIUM SERPL-MCNC: 9.1 MG/DL — SIGNIFICANT CHANGE UP (ref 8.4–10.5)
CHLORIDE SERPL-SCNC: 102 MMOL/L — SIGNIFICANT CHANGE UP (ref 96–108)
CO2 SERPL-SCNC: 24 MMOL/L — SIGNIFICANT CHANGE UP (ref 22–31)
CREAT SERPL-MCNC: 1.09 MG/DL — SIGNIFICANT CHANGE UP (ref 0.5–1.3)
EOSINOPHIL # BLD AUTO: 0.05 K/UL — SIGNIFICANT CHANGE UP (ref 0–0.5)
EOSINOPHIL NFR BLD AUTO: 0.4 % — SIGNIFICANT CHANGE UP (ref 0–6)
GLUCOSE SERPL-MCNC: 130 MG/DL — HIGH (ref 70–99)
HCT VFR BLD CALC: 26.7 % — LOW (ref 39–50)
HGB BLD-MCNC: 8.4 G/DL — LOW (ref 13–17)
IMM GRANULOCYTES NFR BLD AUTO: 0.6 % — SIGNIFICANT CHANGE UP (ref 0–1.5)
LYMPHOCYTES # BLD AUTO: 35.8 % — SIGNIFICANT CHANGE UP (ref 13–44)
LYMPHOCYTES # BLD AUTO: 4.16 K/UL — HIGH (ref 1–3.3)
MAGNESIUM SERPL-MCNC: 1.7 MG/DL — SIGNIFICANT CHANGE UP (ref 1.6–2.6)
MCHC RBC-ENTMCNC: 31.5 GM/DL — LOW (ref 32–36)
MCHC RBC-ENTMCNC: 38.2 PG — HIGH (ref 27–34)
MCV RBC AUTO: 121.4 FL — HIGH (ref 80–100)
MONOCYTES # BLD AUTO: 0.69 K/UL — SIGNIFICANT CHANGE UP (ref 0–0.9)
MONOCYTES NFR BLD AUTO: 5.9 % — SIGNIFICANT CHANGE UP (ref 2–14)
NEUTROPHILS # BLD AUTO: 6.65 K/UL — SIGNIFICANT CHANGE UP (ref 1.8–7.4)
NEUTROPHILS NFR BLD AUTO: 57.2 % — SIGNIFICANT CHANGE UP (ref 43–77)
NRBC # BLD: 0 /100 WBCS — SIGNIFICANT CHANGE UP (ref 0–0)
PHOSPHATE SERPL-MCNC: 3.9 MG/DL — SIGNIFICANT CHANGE UP (ref 2.5–4.5)
PLATELET # BLD AUTO: 39 K/UL — LOW (ref 150–400)
POTASSIUM SERPL-MCNC: 4.3 MMOL/L — SIGNIFICANT CHANGE UP (ref 3.5–5.3)
POTASSIUM SERPL-SCNC: 4.3 MMOL/L — SIGNIFICANT CHANGE UP (ref 3.5–5.3)
PROT SERPL-MCNC: 6.1 G/DL — SIGNIFICANT CHANGE UP (ref 6–8.3)
RBC # BLD: 2.2 M/UL — LOW (ref 4.2–5.8)
RBC # FLD: 17.5 % — HIGH (ref 10.3–14.5)
SODIUM SERPL-SCNC: 138 MMOL/L — SIGNIFICANT CHANGE UP (ref 135–145)
TACROLIMUS SERPL-MCNC: 8.9 NG/ML — SIGNIFICANT CHANGE UP
VANCOMYCIN TROUGH SERPL-MCNC: 13.6 UG/ML — SIGNIFICANT CHANGE UP (ref 10–20)
WBC # BLD: 11.63 K/UL — HIGH (ref 3.8–10.5)
WBC # FLD AUTO: 11.63 K/UL — HIGH (ref 3.8–10.5)

## 2020-04-24 PROCEDURE — 99232 SBSQ HOSP IP/OBS MODERATE 35: CPT

## 2020-04-24 PROCEDURE — 99233 SBSQ HOSP IP/OBS HIGH 50: CPT

## 2020-04-24 RX ADMIN — URSODIOL 300 MILLIGRAM(S): 250 TABLET, FILM COATED ORAL at 18:18

## 2020-04-24 RX ADMIN — URSODIOL 300 MILLIGRAM(S): 250 TABLET, FILM COATED ORAL at 04:42

## 2020-04-24 RX ADMIN — Medication 10 MILLIGRAM(S): at 04:43

## 2020-04-24 RX ADMIN — Medication 250 MILLIGRAM(S): at 05:52

## 2020-04-24 RX ADMIN — Medication 250 MILLIGRAM(S): at 18:18

## 2020-04-24 RX ADMIN — Medication 1 APPLICATION(S): at 18:18

## 2020-04-24 RX ADMIN — SERTRALINE 50 MILLIGRAM(S): 25 TABLET, FILM COATED ORAL at 12:52

## 2020-04-24 RX ADMIN — Medication 1 DROP(S): at 04:41

## 2020-04-24 RX ADMIN — Medication 1 MILLIGRAM(S): at 12:52

## 2020-04-24 RX ADMIN — CEFEPIME 100 MILLIGRAM(S): 1 INJECTION, POWDER, FOR SOLUTION INTRAMUSCULAR; INTRAVENOUS at 04:42

## 2020-04-24 RX ADMIN — TAMSULOSIN HYDROCHLORIDE 0.4 MILLIGRAM(S): 0.4 CAPSULE ORAL at 20:24

## 2020-04-24 RX ADMIN — TACROLIMUS 0.5 MILLIGRAM(S): 5 CAPSULE ORAL at 12:53

## 2020-04-24 RX ADMIN — VORICONAZOLE 200 MILLIGRAM(S): 10 INJECTION, POWDER, LYOPHILIZED, FOR SOLUTION INTRAVENOUS at 18:18

## 2020-04-24 RX ADMIN — ATOVAQUONE 750 MILLIGRAM(S): 750 SUSPENSION ORAL at 04:42

## 2020-04-24 RX ADMIN — Medication 400 MILLIGRAM(S): at 05:52

## 2020-04-24 RX ADMIN — CEFEPIME 100 MILLIGRAM(S): 1 INJECTION, POWDER, FOR SOLUTION INTRAMUSCULAR; INTRAVENOUS at 18:17

## 2020-04-24 RX ADMIN — ATOVAQUONE 750 MILLIGRAM(S): 750 SUSPENSION ORAL at 18:19

## 2020-04-24 RX ADMIN — PANTOPRAZOLE SODIUM 40 MILLIGRAM(S): 20 TABLET, DELAYED RELEASE ORAL at 04:43

## 2020-04-24 RX ADMIN — FINASTERIDE 5 MILLIGRAM(S): 5 TABLET, FILM COATED ORAL at 12:52

## 2020-04-24 RX ADMIN — VORICONAZOLE 200 MILLIGRAM(S): 10 INJECTION, POWDER, LYOPHILIZED, FOR SOLUTION INTRAVENOUS at 04:42

## 2020-04-24 RX ADMIN — Medication 400 MILLIGRAM(S): at 18:19

## 2020-04-24 RX ADMIN — Medication 1 TABLET(S): at 12:52

## 2020-04-24 RX ADMIN — Medication 1 APPLICATION(S): at 04:41

## 2020-04-24 RX ADMIN — Medication 1 DROP(S): at 18:18

## 2020-04-24 NOTE — PROGRESS NOTE ADULT - SUBJECTIVE AND OBJECTIVE BOX
Medicine Progress Note    Patient is a 60y old  Male who presents with a chief complaint of SOB (2020 12:38)      SUBJECTIVE / OVERNIGHT EVENTS: continues dyspneic, on nonrebreather but no events overnight  No s/s alcohol withdrawal, JADE discussed yesterday w team.    ADDITIONAL REVIEW OF SYSTEMS:    MEDICATIONS  (STANDING):  acyclovir   Oral Tab/Cap 400 milliGRAM(s) Oral two times a day  atovaquone Suspension 750 milliGRAM(s) Oral two times a day  cefepime   IVPB 2000 milliGRAM(s) IV Intermittent every 12 hours  finasteride 5 milliGRAM(s) Oral daily  folic acid 1 milliGRAM(s) Oral daily  multivitamin 1 Tablet(s) Oral daily  pantoprazole    Tablet 40 milliGRAM(s) Oral before breakfast  predniSONE   Tablet 10 milliGRAM(s) Oral daily  sertraline 50 milliGRAM(s) Oral daily  tacrolimus 0.5 milliGRAM(s) Oral daily  tamsulosin 0.4 milliGRAM(s) Oral at bedtime  triamcinolone 0.1% Cream 1 Application(s) Topical two times a day  trimethoprim/polymyxin Solution 1 Drop(s) Both EYES two times a day  ursodiol Capsule 300 milliGRAM(s) Oral two times a day  vancomycin  IVPB 1000 milliGRAM(s) IV Intermittent every 12 hours  voriconazole 200 milliGRAM(s) Oral every 12 hours    MEDICATIONS  (PRN):  sodium chloride 0.65% Nasal 1 Spray(s) Both Nostrils three times a day PRN Nasal Congestion    CAPILLARY BLOOD GLUCOSE        I&O's Summary    2020 07:01  -  2020 07:00  --------------------------------------------------------  IN: 670 mL / OUT: 0 mL / NET: 670 mL        PHYSICAL EXAM:  Vital Signs Last 24 Hrs  T(C): 36.6 (2020 12:00), Max: 37.3 (2020 20:39)  T(F): 97.8 (2020 12:00), Max: 99.2 (2020 20:39)  HR: 95 (2020 12:00) (95 - 112)  BP: 111/72 (2020 12:00) (100/58 - 123/83)  BP(mean): --  RR: 19 (2020 12:00) (19 - 22)  SpO2: 94% (2020 12:00) (90% - 95%)  CONSTITUTIONAL: Still very dyspneic, on nonrebreather; more oriented today, able to converse briefly then falls back asleep  ENMT: Moist oral mucosa, no pharyngeal injection or exudates; normal dentition  RESPIRATORY: Normal respiratory effort; lungs are clear to auscultation bilaterally  CARDIOVASCULAR: Regular rate and rhythm, normal S1 and S2, no murmur/rub/gallop; No lower extremity edema; Peripheral pulses are 2+ bilaterally  ABDOMEN: Nontender to palpation, normoactive bowel sounds, no rebound/guarding; No hepatosplenomegaly  PSYCH: A+O to person, place, and time; affect appropriate  NEUROLOGY:  Sleepy, toxic; no gross sensory deficits   SKIN: No rashes; no palpable lesions    LABS:                        8.4    11.63 )-----------( 39       ( 2020 04:49 )             26.7     04-24    138  |  102  |  23  ----------------------------<  130<H>  4.3   |  24  |  1.09    Ca    9.1      2020 04:49  Phos  3.9     04-24  Mg     1.7     04-24    TPro  6.1  /  Alb  3.3  /  TBili  0.6  /  DBili  x   /  AST  14  /  ALT  9<L>  /  AlkPhos  81  04-24          Urinalysis Basic - ( 2020 18:42 )    Color: Light Yellow / Appearance: Clear / S.014 / pH: x  Gluc: x / Ketone: Negative  / Bili: Negative / Urobili: Negative   Blood: x / Protein: Negative / Nitrite: Negative   Leuk Esterase: Negative / RBC: 1 /hpf / WBC 1 /HPF   Sq Epi: x / Non Sq Epi: 0 /hpf / Bacteria: Negative        Culture - Blood (collected 2020 00:48)  Source: .Blood Blood-Venous  Preliminary Report (2020 01:02):    No growth to date.    Culture - Blood (collected 2020 00:48)  Source: .Blood Blood-Venous  Preliminary Report (2020 01:02):    No growth to date.    Culture - Urine (collected 2020 22:19)  Source: .Urine Clean Catch (Midstream)  Final Report (2020 17:32):    No growth      COVID-19 PCR: NotDetec (2020 13:56)      RADIOLOGY & ADDITIONAL TESTS:  Imaging from Last 24 Hours:    Electrocardiogram/QTc Interval:    COORDINATION OF CARE:  Care Discussed with Consultants/Other Providers:

## 2020-04-24 NOTE — PROGRESS NOTE ADULT - ATTENDING COMMENTS
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 Airborne isolation  Plan-  Patient immunosuppressed s/p allogeneic PSCT, on Tacrolimus. Agree with treatment for possible bacterial pneumonia with Cefepime/Vanco IV.   Continue Acyclovir, Mepron, Vfend  Continue Prednisone 10 mg po daily.   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 Airborne isolation  Tacro level= 8.9 on 4/24; BC(4/23)- negative to date  Plan-  Patient immunosuppressed s/p allogeneic PSCT, on Tacrolimus. Agree with treatment for possible bacterial pneumonia with Cefepime/Vanco IV.   Continue Acyclovir, Mepron, Vfend  Continue Prednisone 10 mg po daily.   Continue Tacrolimus, check level daily  Continue Kyle s/p transplant  Continue Folate, MVI, PPI  Continue O2 supplement, monitor O2 sats

## 2020-04-24 NOTE — PHARMACOTHERAPY INTERVENTION NOTE - COMMENTS
Patient had EKG on 4/22 demonstrating QTc of 599 so informed ID attending who made recommendation to discontinue hydrochloroquine.

## 2020-04-24 NOTE — PROGRESS NOTE ADULT - ASSESSMENT
Admission medication history interview status for this patient is complete. See EPIC admission navigator for allergy information, prior to admission medications and immunization status.     Medication history interview source(s):Patient  Medication history resources (including written lists, pill bottles, clinic record):None  Primary pharmacy:[facility]    Changes made to PTA medication list:  Added: none  Deleted: none  Changed: Celexa from daily to PRN (pt has only received one Rx for this and it was back in May), trazodone strength was added, Valtrex to prn     Actions taken by pharmacist (provider contacted, etc):Called [facility] for med details     Additional medication history information:Pt states that she takes a whole tablet of trazodone. The prescription that was filled states that it is the 50 mg tablet but to take 25 mg (1/2) tablet at bedtime prn. Pt doesn't take it often    Medication reconciliation/reorder completed by provider prior to medication history? No    Do you take OTC medications (eg tylenol, ibuprofen, fish oil, eye/ear drops, etc)? N(Y/N)    For patients on insulin therapy: N (Y/N)    Time spent in this activity: 15 min    Prior to Admission medications    Medication Sig Last Dose Taking? Auth Provider   ciprofloxacin (CIPRO) 500 MG tablet Take 1 tablet (500 mg) by mouth 2 times daily 9/13/2018 at Unknown time Yes Ga Alaniz PA-C   etonogestrel (NEXPLANON) 68 MG IMPL 1 each (68 mg) by Subdermal route once  Yes Sabrina Pérez PA-C   multivitamin, therapeutic with minerals (THERA-VIT-M) TABS tablet Take 1 tablet by mouth daily Past Month at Unknown time Yes Messi Pabon MD   TRAZODONE HCL PO Take 25 mg by mouth nightly as needed  Past Week at Unknown time Yes Reported, Patient   amitriptyline (ELAVIL) 10 MG tablet Take 10 mg by mouth nightly as needed for sleep More than a month at Unknown time  Unknown, Entered By History   citalopram (CELEXA) 20 MG tablet  Take 20 mg by mouth daily as needed More than a month at Unknown time  Unknown, Entered By History   fexofenadine (ALLEGRA) 180 MG tablet Take 180 mg by mouth daily as needed for allergies More than a month at Unknown time  Unknown, Entered By History   fluticasone (FLONASE) 50 MCG/ACT spray Spray 1 spray into both nostrils daily as needed for rhinitis or allergies More than a month at Unknown time  Unknown, Entered By History   valACYclovir (VALTREX) 500 MG tablet Take 500 mg by mouth daily as needed (when starting to feel sick) More than a month at Unknown time  Unknown, Entered By History        60 year old male with a history of AML, status Post : 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 He is now admitted with shortness of breath, and found to have likely viral pneumonia on CXR. He is COVID negative x 3

## 2020-04-24 NOTE — PROGRESS NOTE ADULT - PROBLEM SELECTOR PLAN 1
c/w non rebreather- patient is noted to be worsening on non rebreather   check COVID PCR x 3   nasal pcr for staph aureus/MRSA - negative ; can likely stop Vancomycin, awaiting ID input   Continue cefepime 1q12 for now   f/u blood cx, urine cx

## 2020-04-24 NOTE — PROGRESS NOTE ADULT - SUBJECTIVE AND OBJECTIVE BOX
Follow Up:  sepsis, pneumonia    Interval History: COVID again negative, pt afebrile still tachycardic and on NRB 10 liters, WBC improved to 11    ROS:      All other systems negative    Constitutional: no fever, no chills  Eyes: no vision changes, no eye pain  ENT:  no sore throat, no rhinorrhea  Cardiovascular:  no chest pain, no palpitation  Respiratory:  + SOB,  cough  GI:  no abd pain, no vomiting, no diarrhea  urinary: no dysuria, no hematuria, no flank pain  musculoskeletal:  no joint pain, no joint swelling  skin:  no rash  neurology:  no headache, no seizure          Allergies  No Known Allergies        ANTIMICROBIALS:  acyclovir   Oral Tab/Cap 400 two times a day  atovaquone Suspension 750 two times a day  cefepime   IVPB 2000 every 12 hours  vancomycin  IVPB 1000 every 12 hours  voriconazole 200 every 12 hours      OTHER MEDS:  finasteride 5 milliGRAM(s) Oral daily  folic acid 1 milliGRAM(s) Oral daily  multivitamin 1 Tablet(s) Oral daily  pantoprazole    Tablet 40 milliGRAM(s) Oral before breakfast  predniSONE   Tablet 10 milliGRAM(s) Oral daily  sertraline 50 milliGRAM(s) Oral daily  sodium chloride 0.65% Nasal 1 Spray(s) Both Nostrils three times a day PRN  tacrolimus 0.5 milliGRAM(s) Oral daily  tamsulosin 0.4 milliGRAM(s) Oral at bedtime  triamcinolone 0.1% Cream 1 Application(s) Topical two times a day  trimethoprim/polymyxin Solution 1 Drop(s) Both EYES two times a day  ursodiol Capsule 300 milliGRAM(s) Oral two times a day      Vital Signs Last 24 Hrs  T(C): 37.1 (2020 09:09), Max: 37.3 (2020 20:39)  T(F): 98.8 (2020 09:09), Max: 99.2 (2020 20:39)  HR: 102 (2020 09:09) (102 - 112)  BP: 121/74 (2020 09:09) (100/58 - 123/83)  BP(mean): --  RR: 20 (2020 09:09) (20 - 22)  SpO2: 93% (2020 09:09) (90% - 95%)    Physical Exam:  General:   comfortable on NRB  Head: atraumatic, normocephalic  Eye: normal sclera and conjunctiva  ENT:    no oropharyngeal lesions  Cardio:     regular S1, S2  Respiratory:  slightly tachypneic on NRB but more comfortable than yesterday  abd:     soft,   BS +,   no tenderness  :   no CVAT,  no suprapubic tenderness,   no  saucedo  Musculoskeletal:   no joint swelling,   no edema  vascular: no phlebitis, normal pulses  Skin:    no rash  Neurologic:     no focal deficit  psych: normal affect                          8.4    11.63 )-----------( 39       ( 2020 04:49 )             26.7       04-24    138  |  102  |  23  ----------------------------<  130<H>  4.3   |  24  |  1.09    Ca    9.1      2020 04:49  Phos  3.9     04-  Mg     1.7     -24    TPro  6.1  /  Alb  3.3  /  TBili  0.6  /  DBili  x   /  AST  14  /  ALT  9<L>  /  AlkPhos  81  24      Urinalysis Basic - ( 2020 18:42 )    Color: Light Yellow / Appearance: Clear / S.014 / pH: x  Gluc: x / Ketone: Negative  / Bili: Negative / Urobili: Negative   Blood: x / Protein: Negative / Nitrite: Negative   Leuk Esterase: Negative / RBC: 1 /hpf / WBC 1 /HPF   Sq Epi: x / Non Sq Epi: 0 /hpf / Bacteria: Negative        MICROBIOLOGY:  Vancomycin Level, Trough: 13.6 ug/mL (20 @ 04:49)  v  .Blood Blood-Venous  20   No growth to date.  --  --      .Urine Clean Catch (Midstream)  20   No growth  --  --                RADIOLOGY:  Images below independently visualized and reviewed personally, findings as below  < from: Xray Chest 1 View- PORTABLE-Urgent (20 @ 17:56) >  IMPRESSION:   Bilateral nonspecific hazy opacities, right greater than left. Differential includes viral pneumonia secondary to COVID-19.

## 2020-04-24 NOTE — PHARMACOTHERAPY INTERVENTION NOTE - INTERVENTION CATEGORIES
Patient:   LIYA BOND            MRN: [id number]            FIN: [id number]              Age:   1 years     Sex:  FEMALE     :  15   Associated Diagnoses:   None   Author:   CHELY SMITH     History of Present Illness   1 year old male wiht PMhx of mild persistent asthma wiht prior ICU admission presenting with 3 days of cough, runny nose and wheezing.  She had a temperature of 103F 3 days ago but as been afebrile since.  Mother states she frequently misses her controller dose because she forgets.     ED course:  In the ED, she was placed on 1L/kg HFNC, given a duoneb and one additonal hour long as well as prednisolone load. She was able to space to q2h and was admitted to the floor.     Asthma history:   - diagnosis: 1 year ago  - Hx of admissions:   - hx of PICU: 2018, required HFNC   - hx of intubation: none  - Recent ED visits: 2 ED visits in 2019  - Trigger: illness  - Allergies: pets? (mother told to keep her away from pets by a physican but she is unsure  - Hx of eczema: yes  - Home medication: Blue inhaler (2 puffs BID) mother states she misses many doses because she forgets, red inhaler PRN  - Frequency of albuterol at home: only when ill <1 month  - Night time cough: only when sick  - smokers at home: none  - pets: none  - exercise tolerance: seems short of breath when running around with friends  - Fhx of asthma: Matneral aunt  - Pulmonologist: last saw Dr. 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      Physical Examination   VS/Measurements       Vitals between:   12-AUG-2019 00:27:42   TO   13-AUG-2019 00:27:42                   LAST RESULT MINIMUM MAXIMUM  Temperature 36.4 36.2 37.8  Heart Rate 104 70 157  Respiratory Rate 36 26 48  NISBP           119 98 137  NIDBP           66 27 81  NIMBP           84 64 87  SpO2                    98 96 100  FiO2                    0.21 0.21 0.21      General:  No acute distress, Playful.    Eye:  Extraocular movements are intact, Normal conjunctiva.    HENT:  Normocephalic, Oral mucosa is moist.    Respiratory:  course breath sounds in all lung fields, rhonchi throughout, no wheezing, mildly prolonged respiratory phase.   Cardiovascular:  Regular rhythm, No murmur, Normal peripheral perfusion, tachycardia.   Gastrointestinal:  Soft, Non-tender, Non-distended.    Musculoskeletal     Normal range of motion.     Normal strength.     Integumentary:  Warm, Pink.    Neurologic:  Alert, No focal deficits.      Impression and Plan   1 year old female with PMhx of moderate persistent asthma presenting with asthma exacerbation 2/2 viral URI and poor adherence to controller medication at home. Patient is well appearing and improving.     1. Asthma exacerbation  2. Viral URI    Plan  RESP:   - Bronchodilator protocol  - Albuterol nebs q3h - space per protocol  - d/c HFNC, monitor closely  - Prednisolone 13mg BID   - s/p loading dose 30mg 8/12   - hold home Flovent while on oral steroids  - Asthma education   -  AAP prior to d/c     FENGI:  - Pediatric diet  - monitor I/Os  - SLIV    VTE: 0 (encourage highest degree of ambulation/mobility at least 3x daily)  Lines: PIV x1  Dispo:  pending spacing to q4h albuterol neb x2     Discussed with senior resident and attending.  Saige Escalante, PGY-3

## 2020-04-24 NOTE — PROGRESS NOTE ADULT - SUBJECTIVE AND OBJECTIVE BOX
[facility] Transplant Team                                                      Critical / Counseling Time Provided: 30 minutes                                                                                                                                                        Chief Complaint: Shortness of breath     S: Patient seen and examined with [facility] Transplant Team:       O: Vitals:   Vital Signs Last 24 Hrs  T(C): 37.1 (24 Apr 2020 09:09), Max: 37.3 (23 Apr 2020 20:39)  T(F): 98.8 (24 Apr 2020 09:09), Max: 99.2 (23 Apr 2020 20:39)  HR: 102 (24 Apr 2020 09:09) (102 - 112)  BP: 121/74 (24 Apr 2020 09:09) (100/58 - 123/83)  BP(mean): --  RR: 20 (24 Apr 2020 09:09) (20 - 22)  SpO2: 93% (24 Apr 2020 09:09) (90% - 98%)    Admit weight: 90.7kg   Today's weight: 90.7kg 4/22/20     Intake / Output:   04-23 @ 07:01  -  04-24 @ 07:00  --------------------------------------------------------  IN: 670 mL / OUT: 0 mL / NET: 670 mL      PE:   Oropharynx: not examined   CVS: S1, S2 RRR   Lungs: tachypneic, appears to be in mild respiratory distress  Abdomen: + BS x 4, soft, NT, ND   Extremities: no edema  Skin: no rash   Neuro: A&O x 3     Labs:   CBC Full  -  ( 24 Apr 2020 04:49 )  WBC Count : 11.63 K/uL  Hemoglobin : 8.4 g/dL  Hematocrit : 26.7 %  Platelet Count - Automated : 39 K/uL  Mean Cell Volume : 121.4 fl  Mean Cell Hemoglobin : 38.2 pg  Mean Cell Hemoglobin Concentration : 31.5 gm/dL  Auto Neutrophil # : 6.65 K/uL  Auto Lymphocyte # : 4.16 K/uL  Auto Monocyte # : 0.69 K/uL  Auto Eosinophil # : 0.05 K/uL  Auto Basophil # : 0.01 K/uL  Auto Neutrophil % : 57.2 %  Auto Lymphocyte % : 35.8 %  Auto Monocyte % : 5.9 %  Auto Eosinophil % : 0.4 %  Auto Basophil % : 0.1 %                          8.4    11.63 )-----------( 39       ( 24 Apr 2020 04:49 )             26.7     04-24    138  |  102  |  23  ----------------------------<  130<H>  4.3   |  24  |  1.09    Ca    9.1      24 Apr 2020 04:49  Phos  3.9     04-24  Mg     1.7     04-24    TPro  6.1  /  Alb  3.3  /  TBili  0.6  /  DBili  x   /  AST  14  /  ALT  9<L>  /  AlkPhos  81  04-24      LIVER FUNCTIONS - ( 24 Apr 2020 04:49 )  Alb: 3.3 g/dL / Pro: 6.1 g/dL / ALK PHOS: 81 U/L / ALT: 9 U/L / AST: 14 U/L / GGT: x                04-24 @ 05:36  Tacrolimus 8.9                Cyclosporine --   04-23 @ 14:20  Tacrolimus 12.6                Cyclosporine --    Cultures:   Culture Results: blood  No growth to date. (04.23.20 @ 00:48)    Culture Results: blood  No growth to date. (04.23.20 @ 00:48)    Culture Results: urine  No growth (04.22.20 @ 22:19)    Radiology:   EXAM:  XR CHEST PORTABLE URGENT 1V                        PROCEDURE DATE:  04/22/2020    IMPRESSION:   Bilateral nonspecific hazy opacities, right greater than left. Differential includes viral pneumonia secondary to COVID-19.    Meds:   Antimicrobials:   acyclovir   Oral Tab/Cap 400 milliGRAM(s) Oral two times a day  atovaquone Suspension 750 milliGRAM(s) Oral two times a day  cefepime   IVPB 2000 milliGRAM(s) IV Intermittent every 12 hours  hydroxychloroquine 400 milliGRAM(s) Oral every 24 hours  hydroxychloroquine   Oral   vancomycin  IVPB 1000 milliGRAM(s) IV Intermittent every 12 hours  voriconazole 200 milliGRAM(s) Oral every 12 hours      Heme / Onc:       GI:  pantoprazole    Tablet 40 milliGRAM(s) Oral before breakfast  ursodiol Capsule 300 milliGRAM(s) Oral two times a day      Cardiovascular:   tamsulosin 0.4 milliGRAM(s) Oral at bedtime      Immunologic:   tacrolimus 0.5 milliGRAM(s) Oral daily      Other medications:   finasteride 5 milliGRAM(s) Oral daily  folic acid 1 milliGRAM(s) Oral daily  multivitamin 1 Tablet(s) Oral daily  predniSONE   Tablet 10 milliGRAM(s) Oral daily  sertraline 50 milliGRAM(s) Oral daily  triamcinolone 0.1% Cream 1 Application(s) Topical two times a day  trimethoprim/polymyxin Solution 1 Drop(s) Both EYES two times a day      PRN:   sodium chloride 0.65% Nasal 1 Spray(s) Both Nostrils three times a day PRN      A/P:  60 year old male with a history of AML  Status Post : 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 He is now admitted with shortness of breath, and found to have likely viral pneumonia on CXR. He is COVID negative x 3     1. Infectious Disease:   acyclovir   Oral Tab/Cap 400 milliGRAM(s) Oral two times a day  atovaquone Suspension 750 milliGRAM(s) Oral two times a day  cefepime   IVPB 2000 milliGRAM(s) IV Intermittent every 12 hours  hydroxychloroquine 400 milliGRAM(s) Oral every 24 hours  hydroxychloroquine   Oral   vancomycin  IVPB 1000 milliGRAM(s) IV Intermittent every 12 hours  voriconazole 200 milliGRAM(s) Oral every 12 hours    2. GI Prophylaxis:   pantoprazole    Tablet 40 milliGRAM(s) Oral before breakfast    3. Mouthcare; Skin care     4. GVHD prophylaxis   tacrolimus 0.5 milliGRAM(s) Oral daily    5. Transfuse & replete electrolytes prn     6. IV hydration, daily weights, strict I&O, prn diuresis     7. PO intake as tolerated, nutrition follow up as needed, MVI, folic acid     8. Antiemetics, anti-diarrhea medications:      9. OOB as tolerated, physical therapy consult if needed     10. Monitor coags / fibrinogen 2x week, vitamin K as needed     11. Monitor closely for clinical changes, monitor for fevers     12. Emotional support provided, plan of care discussed and questions addressed       I have written the above note for Dr. Quiñones who performed service with me in the room.   Susan Grey NP-C ([phone])    I have seen and examined patient with NP, I agree with above note as scribed. [facility] Transplant Team                                                      Critical / Counseling Time Provided: 30 minutes                                                                                                                                                        Chief Complaint: Shortness of breath     S: Patient seen and examined with [facility] Transplant Team:   Patient awake and alert, able to converse better today. He states he feels better with less shortness of breath, cough  He denies vomiting, diarrhea    O: Vitals:   Vital Signs Last 24 Hrs  T(C): 37.1 (24 Apr 2020 09:09), Max: 37.3 (23 Apr 2020 20:39)  T(F): 98.8 (24 Apr 2020 09:09), Max: 99.2 (23 Apr 2020 20:39)  HR: 102 (24 Apr 2020 09:09) (102 - 112)  BP: 121/74 (24 Apr 2020 09:09) (100/58 - 123/83)  BP(mean): --  RR: 20 (24 Apr 2020 09:09) (20 - 22)  SpO2: 93% (24 Apr 2020 09:09) (90% - 98%)    Admit weight: 90.7kg   Today's weight: 90.7kg 4/22/20     Intake / Output:   04-23 @ 07:01  -  04-24 @ 07:00  --------------------------------------------------------  IN: 670 mL / OUT: 0 mL / NET: 670 mL      PE: Please refer to Hospitalist exam, as patient with high likelihood of COVID-19, although test negative.     Labs:   CBC Full  -  ( 24 Apr 2020 04:49 )  WBC Count : 11.63 K/uL  Hemoglobin : 8.4 g/dL  Hematocrit : 26.7 %  Platelet Count - Automated : 39 K/uL  Mean Cell Volume : 121.4 fl  Mean Cell Hemoglobin : 38.2 pg  Mean Cell Hemoglobin Concentration : 31.5 gm/dL  Auto Neutrophil # : 6.65 K/uL  Auto Lymphocyte # : 4.16 K/uL  Auto Monocyte # : 0.69 K/uL  Auto Eosinophil # : 0.05 K/uL  Auto Basophil # : 0.01 K/uL  Auto Neutrophil % : 57.2 %  Auto Lymphocyte % : 35.8 %  Auto Monocyte % : 5.9 %  Auto Eosinophil % : 0.4 %  Auto Basophil % : 0.1 %                          8.4    11.63 )-----------( 39       ( 24 Apr 2020 04:49 )             26.7     04-24    138  |  102  |  23  ----------------------------<  130<H>  4.3   |  24  |  1.09    Ca    9.1      24 Apr 2020 04:49  Phos  3.9     04-24  Mg     1.7     04-24    TPro  6.1  /  Alb  3.3  /  TBili  0.6  /  DBili  x   /  AST  14  /  ALT  9<L>  /  AlkPhos  81  04-24      LIVER FUNCTIONS - ( 24 Apr 2020 04:49 )  Alb: 3.3 g/dL / Pro: 6.1 g/dL / ALK PHOS: 81 U/L / ALT: 9 U/L / AST: 14 U/L / GGT: x                04-24 @ 05:36  Tacrolimus 8.9                Cyclosporine --   04-23 @ 14:20  Tacrolimus 12.6                Cyclosporine --    Cultures:   Culture Results: blood  No growth to date. (04.23.20 @ 00:48)    Culture Results: blood  No growth to date. (04.23.20 @ 00:48)    Culture Results: urine  No growth (04.22.20 @ 22:19)    Radiology:   EXAM:  XR CHEST PORTABLE URGENT 1V                        PROCEDURE DATE:  04/22/2020    IMPRESSION:   Bilateral nonspecific hazy opacities, right greater than left. Differential includes viral pneumonia secondary to COVID-19.    Meds:   Antimicrobials:   acyclovir   Oral Tab/Cap 400 milliGRAM(s) Oral two times a day  atovaquone Suspension 750 milliGRAM(s) Oral two times a day  cefepime   IVPB 2000 milliGRAM(s) IV Intermittent every 12 hours  hydroxychloroquine 400 milliGRAM(s) Oral every 24 hours  hydroxychloroquine   Oral   vancomycin  IVPB 1000 milliGRAM(s) IV Intermittent every 12 hours  voriconazole 200 milliGRAM(s) Oral every 12 hours      Heme / Onc:       GI:  pantoprazole    Tablet 40 milliGRAM(s) Oral before breakfast  ursodiol Capsule 300 milliGRAM(s) Oral two times a day      Cardiovascular:   tamsulosin 0.4 milliGRAM(s) Oral at bedtime      Immunologic:   tacrolimus 0.5 milliGRAM(s) Oral daily      Other medications:   finasteride 5 milliGRAM(s) Oral daily  folic acid 1 milliGRAM(s) Oral daily  multivitamin 1 Tablet(s) Oral daily  predniSONE   Tablet 10 milliGRAM(s) Oral daily  sertraline 50 milliGRAM(s) Oral daily  triamcinolone 0.1% Cream 1 Application(s) Topical two times a day  trimethoprim/polymyxin Solution 1 Drop(s) Both EYES two times a day      PRN:   sodium chloride 0.65% Nasal 1 Spray(s) Both Nostrils three times a day PRN      A/P:  60 year old male with a history of AML  Status Post : 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 He is now admitted with shortness of breath, and found to have likely viral pneumonia on CXR. He is COVID negative x 3     1. Infectious Disease:   acyclovir   Oral Tab/Cap 400 milliGRAM(s) Oral two times a day  atovaquone Suspension 750 milliGRAM(s) Oral two times a day  cefepime   IVPB 2000 milliGRAM(s) IV Intermittent every 12 hours  hydroxychloroquine 400 milliGRAM(s) Oral every 24 hours  hydroxychloroquine   Oral   vancomycin  IVPB 1000 milliGRAM(s) IV Intermittent every 12 hours  voriconazole 200 milliGRAM(s) Oral every 12 hours    2. GI Prophylaxis:   pantoprazole    Tablet 40 milliGRAM(s) Oral before breakfast    3. Mouth care; Skin care     4. GVHD prophylaxis   tacrolimus 0.5 milliGRAM(s) Oral daily    5. Transfuse & replete electrolytes prn     6. IV hydration, daily weights, strict I&O, prn diuresis     7. PO intake as tolerated, nutrition follow up as needed, MVI, folic acid     8. Antiemetics, anti-diarrhea medications:      9. OOB as tolerated, physical therapy consult if needed     10. Monitor coags / fibrinogen 2x week, vitamin K as needed     11. Monitor closely for clinical changes, monitor for fevers     12. Emotional support provided, plan of care discussed and questions addressed       I have written the above note for Dr. Quiñones who performed service with me in the room.   Susan Grey NP-C ([phone])    I have seen and examined patient with NP, I agree with above note as scribed. [facility] Transplant Team                                                                                                                                                                                                              Chief Complaint: Shortness of breath     S: Patient seen and examined with [facility] Transplant Team:   Patient awake and alert, able to converse better today. He states he feels better with less shortness of breath, cough  He denies vomiting, diarrhea    O: Vitals:   Vital Signs Last 24 Hrs  T(C): 37.1 (24 Apr 2020 09:09), Max: 37.3 (23 Apr 2020 20:39)  T(F): 98.8 (24 Apr 2020 09:09), Max: 99.2 (23 Apr 2020 20:39)  HR: 102 (24 Apr 2020 09:09) (102 - 112)  BP: 121/74 (24 Apr 2020 09:09) (100/58 - 123/83)  BP(mean): --  RR: 20 (24 Apr 2020 09:09) (20 - 22)  SpO2: 93% (24 Apr 2020 09:09) (90% - 98%)    Admit weight: 90.7kg   Today's weight: 90.7kg 4/22/20     Intake / Output:   04-23 @ 07:01  -  04-24 @ 07:00  --------------------------------------------------------  IN: 670 mL / OUT: 0 mL / NET: 670 mL      PE: Please refer to Hospitalist exam, as patient with high likelihood of COVID-19, although test negative.     Labs:   CBC Full  -  ( 24 Apr 2020 04:49 )  WBC Count : 11.63 K/uL  Hemoglobin : 8.4 g/dL  Hematocrit : 26.7 %  Platelet Count - Automated : 39 K/uL  Mean Cell Volume : 121.4 fl  Mean Cell Hemoglobin : 38.2 pg  Mean Cell Hemoglobin Concentration : 31.5 gm/dL  Auto Neutrophil # : 6.65 K/uL  Auto Lymphocyte # : 4.16 K/uL  Auto Monocyte # : 0.69 K/uL  Auto Eosinophil # : 0.05 K/uL  Auto Basophil # : 0.01 K/uL  Auto Neutrophil % : 57.2 %  Auto Lymphocyte % : 35.8 %  Auto Monocyte % : 5.9 %  Auto Eosinophil % : 0.4 %  Auto Basophil % : 0.1 %                          8.4    11.63 )-----------( 39       ( 24 Apr 2020 04:49 )             26.7     04-24    138  |  102  |  23  ----------------------------<  130<H>  4.3   |  24  |  1.09    Ca    9.1      24 Apr 2020 04:49  Phos  3.9     04-24  Mg     1.7     04-24    TPro  6.1  /  Alb  3.3  /  TBili  0.6  /  DBili  x   /  AST  14  /  ALT  9<L>  /  AlkPhos  81  04-24      LIVER FUNCTIONS - ( 24 Apr 2020 04:49 )  Alb: 3.3 g/dL / Pro: 6.1 g/dL / ALK PHOS: 81 U/L / ALT: 9 U/L / AST: 14 U/L / GGT: x                04-24 @ 05:36  Tacrolimus 8.9                Cyclosporine --   04-23 @ 14:20  Tacrolimus 12.6                Cyclosporine --    Cultures:   Culture Results: blood  No growth to date. (04.23.20 @ 00:48)    Culture Results: blood  No growth to date. (04.23.20 @ 00:48)    Culture Results: urine  No growth (04.22.20 @ 22:19)    Radiology:   EXAM:  XR CHEST PORTABLE URGENT 1V                        PROCEDURE DATE:  04/22/2020    IMPRESSION:   Bilateral nonspecific hazy opacities, right greater than left. Differential includes viral pneumonia secondary to COVID-19.    Meds:   Antimicrobials:   acyclovir   Oral Tab/Cap 400 milliGRAM(s) Oral two times a day  atovaquone Suspension 750 milliGRAM(s) Oral two times a day  cefepime   IVPB 2000 milliGRAM(s) IV Intermittent every 12 hours  hydroxychloroquine 400 milliGRAM(s) Oral every 24 hours  hydroxychloroquine   Oral   vancomycin  IVPB 1000 milliGRAM(s) IV Intermittent every 12 hours  voriconazole 200 milliGRAM(s) Oral every 12 hours      Heme / Onc:       GI:  pantoprazole    Tablet 40 milliGRAM(s) Oral before breakfast  ursodiol Capsule 300 milliGRAM(s) Oral two times a day      Cardiovascular:   tamsulosin 0.4 milliGRAM(s) Oral at bedtime      Immunologic:   tacrolimus 0.5 milliGRAM(s) Oral daily      Other medications:   finasteride 5 milliGRAM(s) Oral daily  folic acid 1 milliGRAM(s) Oral daily  multivitamin 1 Tablet(s) Oral daily  predniSONE   Tablet 10 milliGRAM(s) Oral daily  sertraline 50 milliGRAM(s) Oral daily  triamcinolone 0.1% Cream 1 Application(s) Topical two times a day  trimethoprim/polymyxin Solution 1 Drop(s) Both EYES two times a day      PRN:   sodium chloride 0.65% Nasal 1 Spray(s) Both Nostrils three times a day PRN      A/P:  60 year old male with a history of AML  Status Post : 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 He is now admitted with shortness of breath, and found to have likely viral pneumonia on CXR. He is COVID negative x 3     1. Infectious Disease:   acyclovir   Oral Tab/Cap 400 milliGRAM(s) Oral two times a day  atovaquone Suspension 750 milliGRAM(s) Oral two times a day  cefepime   IVPB 2000 milliGRAM(s) IV Intermittent every 12 hours  hydroxychloroquine 400 milliGRAM(s) Oral every 24 hours  hydroxychloroquine   Oral   vancomycin  IVPB 1000 milliGRAM(s) IV Intermittent every 12 hours  voriconazole 200 milliGRAM(s) Oral every 12 hours    2. GI Prophylaxis:   pantoprazole    Tablet 40 milliGRAM(s) Oral before breakfast    3. Mouth care; Skin care     4. GVHD prophylaxis   tacrolimus 0.5 milliGRAM(s) Oral daily    5. Transfuse & replete electrolytes prn     6. IV hydration, daily weights, strict I&O, prn diuresis     7. PO intake as tolerated, nutrition follow up as needed, MVI, folic acid     8. Antiemetics, anti-diarrhea medications:      9. OOB as tolerated, physical therapy consult if needed     10. Monitor coags / fibrinogen 2x week, vitamin K as needed     11. Monitor closely for clinical changes, monitor for fevers     12. Emotional support provided, plan of care discussed and questions addressed       I have written the above note for Dr. Quiñones who performed service with me in the room.   Susan Grey NP-C ([phone])    I have seen and examined patient with NP, I agree with above note as scribed.

## 2020-04-24 NOTE — PROGRESS NOTE ADULT - ASSESSMENT
60 m with HTN, AML s/p bone marrow transplant 10/2019, DVT/PE (not on AC due to thrombocytopenia),  factor V Leiden and HFrEF, BIBEMS for dyspnea was hypotensive and required levophed by EMS but responded to fluids  here febrile to 100.1, BP in 80s-90s, tachycardic and tachypneic on NRB  leukocytosis 19 improved to 13 after antibiotics  COVID negative x 2  CXR: b/l opacities, R>L    fever, tachycardia, hypotension, leukocytosis, septic shock  hypoxic respiratory failure and b/l pneumonia but COVID negative x 3 ?bacterial pneumonia in the setting on AML s/o BMT 10/2019, WBC improved on antibiotics  urine legionella negative, MRSA swab negative, blood cx negative    * check sputum cx  * QTc prolong and pt also on voriconazole, DC plaquenil  * c/w vanco 1 q 12 and cefepime 2 q 12  * check vanco trough tomorrow and monitor renal function to prevent nephrotoxicity  * c/w mepron, voriconazole and acyclovir ppx  * monitor the O2sat    The above assessment and plan was discussed with natanael Faith MD  Pager [phone]  After 5pm and on weekends call [phone] .

## 2020-04-24 NOTE — PROGRESS NOTE ADULT - ASSESSMENT
Treatment Plan:    #Bilat Pneumonia : I still believe this is  secondary to Novel Coronavirus Infection-false neg pcr- perhaps superimposed  bacterial due to immunosuppression-  Bld and Urine cxs remain negative  Contin airborne isolation for Covid.    Consider Bronchoscopy if not improving    - Maintain on airborne isolation.    - Continue with O2 , currently  on non-rebreather mask, continuous oximetry monitoring.    - Obtain daily room air O2 saturations once O2 requirements stabilize.    - d/c  hydroxychloroquine, RBBB w prolonged Qtc   contin Vanco and Cefipime, check levels per ID  - Acetaminophen 650 mg PO q4h PRN fever.   Home medications as outlined        Heme/BMT team and ID appreciated.

## 2020-04-25 LAB
ALBUMIN SERPL ELPH-MCNC: 3.4 G/DL — SIGNIFICANT CHANGE UP (ref 3.3–5)
ALP SERPL-CCNC: 79 U/L — SIGNIFICANT CHANGE UP (ref 40–120)
ALT FLD-CCNC: 9 U/L — LOW (ref 10–45)
ANION GAP SERPL CALC-SCNC: 16 MMOL/L — SIGNIFICANT CHANGE UP (ref 5–17)
AST SERPL-CCNC: 11 U/L — SIGNIFICANT CHANGE UP (ref 10–40)
BILIRUB SERPL-MCNC: 0.5 MG/DL — SIGNIFICANT CHANGE UP (ref 0.2–1.2)
BUN SERPL-MCNC: 22 MG/DL — SIGNIFICANT CHANGE UP (ref 7–23)
CALCIUM SERPL-MCNC: 9.1 MG/DL — SIGNIFICANT CHANGE UP (ref 8.4–10.5)
CHLORIDE SERPL-SCNC: 100 MMOL/L — SIGNIFICANT CHANGE UP (ref 96–108)
CO2 SERPL-SCNC: 21 MMOL/L — LOW (ref 22–31)
CREAT SERPL-MCNC: 0.94 MG/DL — SIGNIFICANT CHANGE UP (ref 0.5–1.3)
GLUCOSE SERPL-MCNC: 130 MG/DL — HIGH (ref 70–99)
HCT VFR BLD CALC: 28.1 % — LOW (ref 39–50)
HGB BLD-MCNC: 8.8 G/DL — LOW (ref 13–17)
MAGNESIUM SERPL-MCNC: 1.6 MG/DL — SIGNIFICANT CHANGE UP (ref 1.6–2.6)
MCHC RBC-ENTMCNC: 31.3 GM/DL — LOW (ref 32–36)
MCHC RBC-ENTMCNC: 37.1 PG — HIGH (ref 27–34)
MCV RBC AUTO: 118.6 FL — HIGH (ref 80–100)
NRBC # BLD: 0 /100 WBCS — SIGNIFICANT CHANGE UP (ref 0–0)
PHOSPHATE SERPL-MCNC: 3.8 MG/DL — SIGNIFICANT CHANGE UP (ref 2.5–4.5)
PLATELET # BLD AUTO: 38 K/UL — LOW (ref 150–400)
POTASSIUM SERPL-MCNC: 4.7 MMOL/L — SIGNIFICANT CHANGE UP (ref 3.5–5.3)
POTASSIUM SERPL-SCNC: 4.7 MMOL/L — SIGNIFICANT CHANGE UP (ref 3.5–5.3)
PROT SERPL-MCNC: 5.9 G/DL — LOW (ref 6–8.3)
RBC # BLD: 2.37 M/UL — LOW (ref 4.2–5.8)
RBC # FLD: 16.4 % — HIGH (ref 10.3–14.5)
SODIUM SERPL-SCNC: 137 MMOL/L — SIGNIFICANT CHANGE UP (ref 135–145)
TACROLIMUS SERPL-MCNC: 8.5 NG/ML — SIGNIFICANT CHANGE UP
VANCOMYCIN FLD-MCNC: 25.5 UG/ML
WBC # BLD: 8.65 K/UL — SIGNIFICANT CHANGE UP (ref 3.8–10.5)
WBC # FLD AUTO: 8.65 K/UL — SIGNIFICANT CHANGE UP (ref 3.8–10.5)

## 2020-04-25 PROCEDURE — 99231 SBSQ HOSP IP/OBS SF/LOW 25: CPT | Mod: GC

## 2020-04-25 PROCEDURE — 99232 SBSQ HOSP IP/OBS MODERATE 35: CPT

## 2020-04-25 RX ADMIN — Medication 1 TABLET(S): at 14:37

## 2020-04-25 RX ADMIN — TACROLIMUS 0.5 MILLIGRAM(S): 5 CAPSULE ORAL at 14:37

## 2020-04-25 RX ADMIN — Medication 400 MILLIGRAM(S): at 19:06

## 2020-04-25 RX ADMIN — TAMSULOSIN HYDROCHLORIDE 0.4 MILLIGRAM(S): 0.4 CAPSULE ORAL at 20:50

## 2020-04-25 RX ADMIN — VORICONAZOLE 200 MILLIGRAM(S): 10 INJECTION, POWDER, LYOPHILIZED, FOR SOLUTION INTRAVENOUS at 18:58

## 2020-04-25 RX ADMIN — URSODIOL 300 MILLIGRAM(S): 250 TABLET, FILM COATED ORAL at 04:30

## 2020-04-25 RX ADMIN — FINASTERIDE 5 MILLIGRAM(S): 5 TABLET, FILM COATED ORAL at 14:37

## 2020-04-25 RX ADMIN — ATOVAQUONE 750 MILLIGRAM(S): 750 SUSPENSION ORAL at 04:23

## 2020-04-25 RX ADMIN — Medication 1 DROP(S): at 18:57

## 2020-04-25 RX ADMIN — Medication 10 MILLIGRAM(S): at 04:26

## 2020-04-25 RX ADMIN — Medication 1 MILLIGRAM(S): at 14:37

## 2020-04-25 RX ADMIN — VORICONAZOLE 200 MILLIGRAM(S): 10 INJECTION, POWDER, LYOPHILIZED, FOR SOLUTION INTRAVENOUS at 04:27

## 2020-04-25 RX ADMIN — Medication 1 APPLICATION(S): at 18:57

## 2020-04-25 RX ADMIN — Medication 1 DROP(S): at 04:30

## 2020-04-25 RX ADMIN — Medication 1 APPLICATION(S): at 04:30

## 2020-04-25 RX ADMIN — PANTOPRAZOLE SODIUM 40 MILLIGRAM(S): 20 TABLET, DELAYED RELEASE ORAL at 04:25

## 2020-04-25 RX ADMIN — CEFEPIME 100 MILLIGRAM(S): 1 INJECTION, POWDER, FOR SOLUTION INTRAMUSCULAR; INTRAVENOUS at 18:57

## 2020-04-25 RX ADMIN — ATOVAQUONE 750 MILLIGRAM(S): 750 SUSPENSION ORAL at 18:57

## 2020-04-25 RX ADMIN — Medication 250 MILLIGRAM(S): at 04:31

## 2020-04-25 RX ADMIN — Medication 400 MILLIGRAM(S): at 04:23

## 2020-04-25 RX ADMIN — URSODIOL 300 MILLIGRAM(S): 250 TABLET, FILM COATED ORAL at 18:58

## 2020-04-25 RX ADMIN — SERTRALINE 50 MILLIGRAM(S): 25 TABLET, FILM COATED ORAL at 14:37

## 2020-04-25 RX ADMIN — CEFEPIME 100 MILLIGRAM(S): 1 INJECTION, POWDER, FOR SOLUTION INTRAMUSCULAR; INTRAVENOUS at 04:25

## 2020-04-25 NOTE — PROGRESS NOTE ADULT - ASSESSMENT
60 year old male with a history of AML, status Post : 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 He is now admitted with shortness of breath, and found to have likely viral pneumonia on CXR. He is COVID negative x 3

## 2020-04-25 NOTE — PROGRESS NOTE ADULT - SUBJECTIVE AND OBJECTIVE BOX
[facility] Division of Hospital Medicine Progress Note    Patient is a 60y old  Male who presents with a chief complaint of SOB (24 Apr 2020 14:12)      SUBJECTIVE / OVERNIGHT EVENTS:Deployed to [facility] due to COVMILLIE mueller   ADDITIONAL REVIEW OF SYSTEMS:saw pt today awake alert , lying in bed , no distress , denies CP, SOB , palpitation , no dizzy spells, no fever, no ABD pain , N/V c/o loss of appetite did not have his breakfast     MEDICATIONS  (STANDING):  acyclovir   Oral Tab/Cap 400 milliGRAM(s) Oral two times a day  atovaquone Suspension 750 milliGRAM(s) Oral two times a day  cefepime   IVPB 2000 milliGRAM(s) IV Intermittent every 12 hours  finasteride 5 milliGRAM(s) Oral daily  folic acid 1 milliGRAM(s) Oral daily  multivitamin 1 Tablet(s) Oral daily  pantoprazole    Tablet 40 milliGRAM(s) Oral before breakfast  predniSONE   Tablet 10 milliGRAM(s) Oral daily  sertraline 50 milliGRAM(s) Oral daily  tacrolimus 0.5 milliGRAM(s) Oral daily  tamsulosin 0.4 milliGRAM(s) Oral at bedtime  triamcinolone 0.1% Cream 1 Application(s) Topical two times a day  trimethoprim/polymyxin Solution 1 Drop(s) Both EYES two times a day  ursodiol Capsule 300 milliGRAM(s) Oral two times a day  voriconazole 200 milliGRAM(s) Oral every 12 hours    MEDICATIONS  (PRN):  sodium chloride 0.65% Nasal 1 Spray(s) Both Nostrils three times a day PRN Nasal Congestion      CAPILLARY BLOOD GLUCOSE        I&O's Summary    24 Apr 2020 07:01  -  25 Apr 2020 07:00  --------------------------------------------------------  IN: 0 mL / OUT: 350 mL / NET: -350 mL        PHYSICAL EXAM:  Vital Signs Last 24 Hrs  T(C): 36.4 (25 Apr 2020 05:00), Max: 36.6 (24 Apr 2020 12:00)  T(F): 97.6 (25 Apr 2020 05:00), Max: 97.9 (24 Apr 2020 20:23)  HR: 92 (25 Apr 2020 05:00) (92 - 100)  BP: 112/66 (25 Apr 2020 05:00) (111/67 - 112/66)  BP(mean): --  RR: 20 (25 Apr 2020 05:00) (19 - 20)  SpO2: 96% (25 Apr 2020 05:00) (93% - 96%)  CONSTITUTIONAL: NAD, well-developed, well-groomed, on NRB MAsk   ENMT: Moist oral mucosa, no pharyngeal injection or exudates; normal dentition  RESPIRATORY: Normal respiratory effort; lungs are clear to auscultation bilaterally  CARDIOVASCULAR: Regular rate and rhythm, normal S1 and S2, no murmur/rub/gallop; No lower extremity edema; Peripheral pulses are 2+ bilaterally  ABDOMEN: Nontender to palpation, normoactive bowel sounds, no rebound/guarding; No hepatosplenomegaly  PSYCH: A+O to person, place, and time; affect appropriate  NEUROLOGY: CN 2-12 are intact and symmetric; no gross sensory deficits   SKIN: No rashes; no palpable lesions    LABS:                        8.4    11.63 )-----------( 39       ( 24 Apr 2020 04:49 )             26.7     04-24    138  |  102  |  23  ----------------------------<  130<H>  4.3   |  24  |  1.09    Ca    9.1      24 Apr 2020 04:49  Phos  3.9     04-24  Mg     1.7     04-24    TPro  6.1  /  Alb  3.3  /  TBili  0.6  /  DBili  x   /  AST  14  /  ALT  9<L>  /  AlkPhos  81  04-24    D-Dimer Assay, Quantitative (04.22.20 @ 17:50)    D-Dimer Assay, Quantitative: 1662: D-Dimer result less than 230 ng/mL DDU correlates with the absence  of thrombosis in a patient with a low and moderate       pre-test probability of thrombosis.  1 DDU is approximately equal to  2 ng/mL FEU (previous units). ng/mL DDU        Vancomycin Level, Random (04.25.20 @ 07:09)    Vancomycin Level, Random: 25.5: The "VANCOMYCIN, RANDOM" test should only be ordered for patients with  severe renal dysfunction or on dialysis. Therapeutic ranges have not been  established and results must be interpreted in the context of patient's  clinical condition.  NOTE: Therapeutic range for Trough Vancomycin is 10-20 mcg/mL. ug/mL      Culture - Blood (collected 23 Apr 2020 00:48)  Source: .Blood Blood-Venous  Preliminary Report (24 Apr 2020 01:02):    No growth to date.    Culture - Blood (collected 23 Apr 2020 00:48)  Source: .Blood Blood-Venous  Preliminary Report (24 Apr 2020 01:02):    No growth to date.    Culture - Urine (collected 22 Apr 2020 22:19)  Source: .Urine Clean Catch (Midstream)  Final Report (23 Apr 2020 17:32):    No growth      COVID-19 PCR: NotDetec (04-23-20 @ 13:56)  COVID-19 PCR: NotDetec (04-22-20 @ 20:58)  COVID-19 PCR: NotDetec (04-22-20 @ 17:50)      RADIOLOGY & ADDITIONAL TESTS:  Imaging from Last 24 Hours:  < from: Xray Chest 1 View- PORTABLE-Urgent (04.22.20 @ 17:56) >  IMPRESSION:   Bilateral nonspecific hazy opacities, right greater than left. Differential includes viral pneumonia secondary to COVID-19.      < end of copied text >    Electrocardiogram/QTc Interval:    COORDINATION OF CARE:  Care Discussed with Consultants/Other Providers:

## 2020-04-25 NOTE — PROGRESS NOTE ADULT - ASSESSMENT
· Assessment		  60 m with HTN, AML s/p bone marrow transplant 10/2019, DVT/PE (not on AC due to thrombocytopenia),  factor V Leiden and HFrEF, BIBEMS for dyspnea was hypotensive and required levophed by EMS but responded to fluids  today afebrile /66 spo2 94 to 96 on NRB  leukocytosis 19 improved to 11.6 after antibiotics  COVID negative x 3  CXR: b/l opacities, R>L    # hypoxic respiratory failure and b/l pneumonia but COVID negative x 3 ?bacterial pneumonia in the setting on AML s/o BMT 10/2019, WBC improved on antibiotics  urine legionella negative, MRSA swab negative, blood cx negative so far   -ID and hematology consult appreciated   -check sputum cx- pending collection   -QTc prolong and pt also on voriconazole, Plaquenil discontinued   -c/w vanco 1 q 12 and cefepime 2 q 12  - f/u vanco trough-elevated vanco held  -c/w mepron, voriconazole and acyclovir ppx  -continue monitor the O2sat goal 92-96 %--today spo2 94 to 96 on NRB  -needs daily CBC with diff and Tacrolimus levels, CMp    #loss of appetite- encouraged ensure - pt  requested Chocolate flavor     DVT prophylaxis - not on anticoagulation due to thrombocytopenia- advised leg and foot movements OOB to chair encouraged     Spoke with Pt Emergency contact -Roommate Roger (nurse)  on phone update pt condition for today

## 2020-04-25 NOTE — PROGRESS NOTE ADULT - PROVIDER SPECIALTY LIST ADULT
BMT/SCT Implemented All Universal Safety Interventions:  [place] to call system. Call bell, personal items and telephone within reach. Instruct patient to call for assistance. Room bathroom lighting operational. Non-slip footwear when patient is off stretcher. Physically safe environment: no spills, clutter or unnecessary equipment. Stretcher in lowest position, wheels locked, appropriate side rails in place.

## 2020-04-25 NOTE — PROGRESS NOTE ADULT - ATTENDING COMMENTS
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 Airborne isolation  Tacro level= 8.9 on 4/24; BC(4/23)- negative to date  Plan-  Patient immunosuppressed s/p allogeneic PSCT, on Tacrolimus. Agree with treatment for possible bacterial pneumonia with Cefepime/Vanco IV.   Continue Acyclovir, Mepron, Vfend  Continue Prednisone 10 mg po daily.   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 Airborne isolation  Tacro level= 8.9 on 4/24; BC(4/23)- negative to date  Plan-  Patient immunosuppressed s/p allogeneic PSCT, on Tacrolimus. Agree with treatment for possible bacterial pneumonia with Cefepime/Vanco IV.   Continue Acyclovir, Mepron, Vfend  Continue Prednisone 10 mg po daily.   Continue Tacrolimus, check level daily - labs today pending  Continue Kyle s/p transplant  Continue Folate, MVI, PPI  Continue O2 supplement, monitor O2 sats

## 2020-04-25 NOTE — PROGRESS NOTE ADULT - SUBJECTIVE AND OBJECTIVE BOX
[facility] Transplant Team                                                      Critical / Counseling Time Provided: 30 minutes                                                                                                                                                        Chief Complaint: Shortness of breath       S: Patient seen and examined with [facility] Transplant Team:     Denies mouth / tongue / throat pain, dyspnea, cough, nausea, vomiting, diarrhea, abdominal pain     O: Vitals:   Vital Signs Last 24 Hrs  T(C): 36.4 (25 Apr 2020 12:33), Max: 36.6 (24 Apr 2020 20:23)  T(F): 97.5 (25 Apr 2020 12:33), Max: 97.9 (24 Apr 2020 20:23)  HR: 107 (25 Apr 2020 12:33) (92 - 107)  BP: 119/73 (25 Apr 2020 12:33) (111/67 - 119/73)  BP(mean): --  RR: 20 (25 Apr 2020 12:33) (20 - 20)  SpO2: 97% (25 Apr 2020 12:33) (93% - 97%)    Admit weight: 90.7 kg   Today's weight: pending     Intake / Output:   04-24 @ 07:01 - 04-25 @ 07:00  --------------------------------------------------------  IN: 0 mL / OUT: 350 mL / NET: -350 mL    04-25 @ 07:01  -  04-25 @ 14:09  --------------------------------------------------------  IN: 0 mL / OUT: 300 mL / NET: -300 mL        PE:   Please refer to Hospitalist exam, as patient with high likelihood of COVID-19, although test negative.   Oropharynx:   Oral Mucositis:                                                        Grade:   CVS:   Lungs:   Abdomen:  Extremities:   Gastric Mucositis:                                                  Grade:   Intestinal Mucositis:                                              Grade:   Skin:   TLC:   Neuro:   Pain:     Labs:   CBC Full  -  ( 24 Apr 2020 04:49 )  WBC Count : 11.63 K/uL  Hemoglobin : 8.4 g/dL  Hematocrit : 26.7 %  Platelet Count - Automated : 39 K/uL  Mean Cell Volume : 121.4 fl  Mean Cell Hemoglobin : 38.2 pg  Mean Cell Hemoglobin Concentration : 31.5 gm/dL  Auto Neutrophil # : 6.65 K/uL  Auto Lymphocyte # : 4.16 K/uL  Auto Monocyte # : 0.69 K/uL  Auto Eosinophil # : 0.05 K/uL  Auto Basophil # : 0.01 K/uL  Auto Neutrophil % : 57.2 %  Auto Lymphocyte % : 35.8 %  Auto Monocyte % : 5.9 %  Auto Eosinophil % : 0.4 %  Auto Basophil % : 0.1 %                          8.4    11.63 )-----------( 39       ( 24 Apr 2020 04:49 )             26.7     04-24    138  |  102  |  23  ----------------------------<  130<H>  4.3   |  24  |  1.09    Ca    9.1      24 Apr 2020 04:49  Phos  3.9     04-24  Mg     1.7     04-24    TPro  6.1  /  Alb  3.3  /  TBili  0.6  /  DBili  x   /  AST  14  /  ALT  9<L>  /  AlkPhos  81  04-24      LIVER FUNCTIONS - ( 24 Apr 2020 04:49 )  Alb: 3.3 g/dL / Pro: 6.1 g/dL / ALK PHOS: 81 U/L / ALT: 9 U/L / AST: 14 U/L / GGT: x               Cultures:   Culture - Blood (04.23.20 @ 00:48)    Specimen Source: .Blood Blood-Venous    Culture Results:   No growth to date.      Radiology:   < from: Xray Chest 1 View- PORTABLE-Urgent (04.22.20 @ 17:56) >    IMPRESSION:   Bilateral nonspecific hazy opacities, right greater than left. Differential includes viral pneumonia secondary to COVID-19.    Meds:   Antimicrobials:   acyclovir   Oral Tab/Cap 400 milliGRAM(s) Oral two times a day  atovaquone Suspension 750 milliGRAM(s) Oral two times a day  cefepime   IVPB 2000 milliGRAM(s) IV Intermittent every 12 hours  voriconazole 200 milliGRAM(s) Oral every 12 hours    GI:  pantoprazole    Tablet 40 milliGRAM(s) Oral before breakfast  ursodiol Capsule 300 milliGRAM(s) Oral two times a day    Cardiovascular:   tamsulosin 0.4 milliGRAM(s) Oral at bedtime    Immunologic:   tacrolimus 0.5 milliGRAM(s) Oral daily    Other medications:   finasteride 5 milliGRAM(s) Oral daily  folic acid 1 milliGRAM(s) Oral daily  multivitamin 1 Tablet(s) Oral daily  predniSONE   Tablet 10 milliGRAM(s) Oral daily  sertraline 50 milliGRAM(s) Oral daily  triamcinolone 0.1% Cream 1 Application(s) Topical two times a day  trimethoprim/polymyxin Solution 1 Drop(s) Both EYES two times a day    PRN:   sodium chloride 0.65% Nasal 1 Spray(s) Both Nostrils three times a day PRN      A/P:   60 year old male with a history of AML  Status Post : 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 He is now admitted with shortness of breath, and found to have likely viral pneumonia on CXR. He is COVID negative x 3      1. Infectious Disease:   acyclovir   Oral Tab/Cap 400 milliGRAM(s) Oral two times a day  atovaquone Suspension 750 milliGRAM(s) Oral two times a day  cefepime   IVPB 2000 milliGRAM(s) IV Intermittent every 12 hours  hydroxychloroquine 400 milliGRAM(s) Oral every 24 hours  hydroxychloroquine   Oral   vancomycin  IVPB 1000 milliGRAM(s) IV Intermittent every 12 hours  voriconazole 200 milliGRAM(s) Oral every 12 hours    2. GI Prophylaxis:   pantoprazole    Tablet 40 milliGRAM(s) Oral before breakfast    3. Mouth care; Skin care     4. GVHD prophylaxis   tacrolimus 0.5 milliGRAM(s) Oral daily. Follow up tacro levels prior to dose daily     5. Transfuse & replete electrolytes prn     6. IV hydration, daily weights, strict I&O, prn diuresis     7. PO intake as tolerated, nutrition follow up as needed, MVI, folic acid     8. Antiemetics, anti-diarrhea medications:      9. OOB as tolerated, physical therapy consult if needed     10. Monitor coags / fibrinogen 2x week, vitamin K as needed     11. Monitor closely for clinical changes, monitor for fevers     12. Emotional support provided, plan of care discussed and questions addressed      I have written the above note for Dr. Vanessa who performed service with me in the room.   Zulma MART ([phone])    I have seen and examined patient with NP, I agree with above note as scribed. [facility] Transplant Team                                                      Critical / Counseling Time Provided: 30 minutes                                                                                                                                                        Chief Complaint: Shortness of breath     O: Vitals:   Vital Signs Last 24 Hrs  T(C): 36.4 (25 Apr 2020 12:33), Max: 36.6 (24 Apr 2020 20:23)  T(F): 97.5 (25 Apr 2020 12:33), Max: 97.9 (24 Apr 2020 20:23)  HR: 107 (25 Apr 2020 12:33) (92 - 107)  BP: 119/73 (25 Apr 2020 12:33) (111/67 - 119/73)  BP(mean): --  RR: 20 (25 Apr 2020 12:33) (20 - 20)  SpO2: 97% (25 Apr 2020 12:33) (93% - 97%)    Admit weight: 90.7 kg   Today's weight: pending     Intake / Output:   04-24 @ 07:01  -  04-25 @ 07:00  --------------------------------------------------------  IN: 0 mL / OUT: 350 mL / NET: -350 mL    04-25 @ 07:01  -  04-25 @ 14:09  --------------------------------------------------------  IN: 0 mL / OUT: 300 mL / NET: -300 mL        PE:   Please refer to primary team exam, as patient with high likelihood of COVID-19, although test negative.   CONSTITUTIONAL: NAD, well-developed, well-groomed, on NRB MAsk   ENMT: Moist oral mucosa, no pharyngeal injection or exudates; normal dentition  RESPIRATORY: Normal respiratory effort; lungs are clear to auscultation bilaterally  CARDIOVASCULAR: Regular rate and rhythm, normal S1 and S2, no murmur/rub/gallop; No lower extremity edema; Peripheral pulses are 2+ bilaterally  ABDOMEN: Nontender to palpation, normoactive bowel sounds, no rebound/guarding; No hepatosplenomegaly  PSYCH: A+O to person, place, and time; affect appropriate  NEUROLOGY: CN 2-12 are intact and symmetric; no gross sensory deficits   SKIN: No rashes; no palpable lesions      Labs:   CBC Full  -  ( 24 Apr 2020 04:49 )  WBC Count : 11.63 K/uL  Hemoglobin : 8.4 g/dL  Hematocrit : 26.7 %  Platelet Count - Automated : 39 K/uL  Mean Cell Volume : 121.4 fl  Mean Cell Hemoglobin : 38.2 pg  Mean Cell Hemoglobin Concentration : 31.5 gm/dL  Auto Neutrophil # : 6.65 K/uL  Auto Lymphocyte # : 4.16 K/uL  Auto Monocyte # : 0.69 K/uL  Auto Eosinophil # : 0.05 K/uL  Auto Basophil # : 0.01 K/uL  Auto Neutrophil % : 57.2 %  Auto Lymphocyte % : 35.8 %  Auto Monocyte % : 5.9 %  Auto Eosinophil % : 0.4 %  Auto Basophil % : 0.1 %                          8.4    11.63 )-----------( 39       ( 24 Apr 2020 04:49 )             26.7     04-24    138  |  102  |  23  ----------------------------<  130<H>  4.3   |  24  |  1.09    Ca    9.1      24 Apr 2020 04:49  Phos  3.9     04-24  Mg     1.7     04-24    TPro  6.1  /  Alb  3.3  /  TBili  0.6  /  DBili  x   /  AST  14  /  ALT  9<L>  /  AlkPhos  81  04-24      LIVER FUNCTIONS - ( 24 Apr 2020 04:49 )  Alb: 3.3 g/dL / Pro: 6.1 g/dL / ALK PHOS: 81 U/L / ALT: 9 U/L / AST: 14 U/L / GGT: x               Cultures:   Culture - Blood (04.23.20 @ 00:48)    Specimen Source: .Blood Blood-Venous    Culture Results:   No growth to date.      Radiology:   < from: Xray Chest 1 View- PORTABLE-Urgent (04.22.20 @ 17:56) >    IMPRESSION:   Bilateral nonspecific hazy opacities, right greater than left. Differential includes viral pneumonia secondary to COVID-19.    Meds:   Antimicrobials:   acyclovir   Oral Tab/Cap 400 milliGRAM(s) Oral two times a day  atovaquone Suspension 750 milliGRAM(s) Oral two times a day  cefepime   IVPB 2000 milliGRAM(s) IV Intermittent every 12 hours  voriconazole 200 milliGRAM(s) Oral every 12 hours    GI:  pantoprazole    Tablet 40 milliGRAM(s) Oral before breakfast  ursodiol Capsule 300 milliGRAM(s) Oral two times a day    Cardiovascular:   tamsulosin 0.4 milliGRAM(s) Oral at bedtime    Immunologic:   tacrolimus 0.5 milliGRAM(s) Oral daily    Other medications:   finasteride 5 milliGRAM(s) Oral daily  folic acid 1 milliGRAM(s) Oral daily  multivitamin 1 Tablet(s) Oral daily  predniSONE   Tablet 10 milliGRAM(s) Oral daily  sertraline 50 milliGRAM(s) Oral daily  triamcinolone 0.1% Cream 1 Application(s) Topical two times a day  trimethoprim/polymyxin Solution 1 Drop(s) Both EYES two times a day    PRN:   sodium chloride 0.65% Nasal 1 Spray(s) Both Nostrils three times a day PRN      A/P:   60 year old male with a history of AML  Status Post : 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 He is now admitted with shortness of breath, and found to have likely viral pneumonia on CXR. He is COVID negative x 3      1. Infectious Disease:   acyclovir   Oral Tab/Cap 400 milliGRAM(s) Oral two times a day  atovaquone Suspension 750 milliGRAM(s) Oral two times a day  cefepime   IVPB 2000 milliGRAM(s) IV Intermittent every 12 hours  hydroxychloroquine 400 milliGRAM(s) Oral every 24 hours  hydroxychloroquine   Oral   vancomycin  IVPB 1000 milliGRAM(s) IV Intermittent every 12 hours  voriconazole 200 milliGRAM(s) Oral every 12 hours    2. GI Prophylaxis:   pantoprazole    Tablet 40 milliGRAM(s) Oral before breakfast    3. Mouth care; Skin care     4. GVHD prophylaxis   tacrolimus 0.5 milliGRAM(s) Oral daily. Follow up tacro levels prior to dose daily     5. Transfuse & replete electrolytes prn     6. IV hydration, daily weights, strict I&O, prn diuresis     7. PO intake as tolerated, nutrition follow up as needed, MVI, folic acid     8. Antiemetics, anti-diarrhea medications:      9. OOB as tolerated, physical therapy consult if needed     10. Monitor coags / fibrinogen 2x week, vitamin K as needed     11. Monitor closely for clinical changes, monitor for fevers     12. Emotional support provided, plan of care discussed and questions addressed      I have written the above note for Dr. Vanessa who performed service with me in the room.   Zulma Chow NP-C ([phone])    I have seen and examined patient with NP, I agree with above note as scribed. [facility] Transplant Team                                                      Critical / Counseling Time Provided: 30 minutes                                                                                                                                                        Chief Complaint: Shortness of breath     O: Vitals:   Vital Signs Last 24 Hrs  T(C): 36.4 (25 Apr 2020 12:33), Max: 36.6 (24 Apr 2020 20:23)  T(F): 97.5 (25 Apr 2020 12:33), Max: 97.9 (24 Apr 2020 20:23)  HR: 107 (25 Apr 2020 12:33) (92 - 107)  BP: 119/73 (25 Apr 2020 12:33) (111/67 - 119/73)  BP(mean): --  RR: 20 (25 Apr 2020 12:33) (20 - 20)  SpO2: 97% (25 Apr 2020 12:33) (93% - 97%)    Admit weight: 90.7 kg   Today's weight: pending     Intake / Output:   04-24 @ 07:01  -  04-25 @ 07:00  --------------------------------------------------------  IN: 0 mL / OUT: 350 mL / NET: -350 mL    04-25 @ 07:01  -  04-25 @ 14:09  --------------------------------------------------------  IN: 0 mL / OUT: 300 mL / NET: -300 mL        PE:   Please refer to primary team exam, as patient with high likelihood of COVID-19, although test negative.   CONSTITUTIONAL: NAD, well-developed, well-groomed, on NRB MAsk   ENMT: Moist oral mucosa, no pharyngeal injection or exudates; normal dentition  RESPIRATORY: Normal respiratory effort; lungs are clear to auscultation bilaterally  CARDIOVASCULAR: Regular rate and rhythm, normal S1 and S2, no murmur/rub/gallop; No lower extremity edema; Peripheral pulses are 2+ bilaterally  ABDOMEN: Nontender to palpation, normoactive bowel sounds, no rebound/guarding; No hepatosplenomegaly  PSYCH: A+O to person, place, and time; affect appropriate  NEUROLOGY: CN 2-12 are intact and symmetric; no gross sensory deficits   SKIN: No rashes; no palpable lesions      Labs:   pending 4/25 daily labs     CBC Full  -  ( 24 Apr 2020 04:49 )  WBC Count : 11.63 K/uL  Hemoglobin : 8.4 g/dL  Hematocrit : 26.7 %  Platelet Count - Automated : 39 K/uL  Mean Cell Volume : 121.4 fl  Mean Cell Hemoglobin : 38.2 pg  Mean Cell Hemoglobin Concentration : 31.5 gm/dL  Auto Neutrophil # : 6.65 K/uL  Auto Lymphocyte # : 4.16 K/uL  Auto Monocyte # : 0.69 K/uL  Auto Eosinophil # : 0.05 K/uL  Auto Basophil # : 0.01 K/uL  Auto Neutrophil % : 57.2 %  Auto Lymphocyte % : 35.8 %  Auto Monocyte % : 5.9 %  Auto Eosinophil % : 0.4 %  Auto Basophil % : 0.1 %                          8.4    11.63 )-----------( 39       ( 24 Apr 2020 04:49 )             26.7     04-24    138  |  102  |  23  ----------------------------<  130<H>  4.3   |  24  |  1.09    Ca    9.1      24 Apr 2020 04:49  Phos  3.9     04-24  Mg     1.7     04-24    TPro  6.1  /  Alb  3.3  /  TBili  0.6  /  DBili  x   /  AST  14  /  ALT  9<L>  /  AlkPhos  81  04-24      LIVER FUNCTIONS - ( 24 Apr 2020 04:49 )  Alb: 3.3 g/dL / Pro: 6.1 g/dL / ALK PHOS: 81 U/L / ALT: 9 U/L / AST: 14 U/L / GGT: x               Cultures:   Culture - Blood (04.23.20 @ 00:48)    Specimen Source: .Blood Blood-Venous    Culture Results:   No growth to date.      Radiology:   < from: Xray Chest 1 View- PORTABLE-Urgent (04.22.20 @ 17:56) >    IMPRESSION:   Bilateral nonspecific hazy opacities, right greater than left. Differential includes viral pneumonia secondary to COVID-19.    Meds:   Antimicrobials:   acyclovir   Oral Tab/Cap 400 milliGRAM(s) Oral two times a day  atovaquone Suspension 750 milliGRAM(s) Oral two times a day  cefepime   IVPB 2000 milliGRAM(s) IV Intermittent every 12 hours  voriconazole 200 milliGRAM(s) Oral every 12 hours    GI:  pantoprazole    Tablet 40 milliGRAM(s) Oral before breakfast  ursodiol Capsule 300 milliGRAM(s) Oral two times a day    Cardiovascular:   tamsulosin 0.4 milliGRAM(s) Oral at bedtime    Immunologic:   tacrolimus 0.5 milliGRAM(s) Oral daily    Other medications:   finasteride 5 milliGRAM(s) Oral daily  folic acid 1 milliGRAM(s) Oral daily  multivitamin 1 Tablet(s) Oral daily  predniSONE   Tablet 10 milliGRAM(s) Oral daily  sertraline 50 milliGRAM(s) Oral daily  triamcinolone 0.1% Cream 1 Application(s) Topical two times a day  trimethoprim/polymyxin Solution 1 Drop(s) Both EYES two times a day    PRN:   sodium chloride 0.65% Nasal 1 Spray(s) Both Nostrils three times a day PRN      A/P:   60 year old male with a history of AML  Status Post : 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 He is now admitted with shortness of breath, and found to have likely viral pneumonia on CXR. He is COVID negative x 3      1. Infectious Disease:   acyclovir   Oral Tab/Cap 400 milliGRAM(s) Oral two times a day  atovaquone Suspension 750 milliGRAM(s) Oral two times a day  cefepime   IVPB 2000 milliGRAM(s) IV Intermittent every 12 hours  hydroxychloroquine 400 milliGRAM(s) Oral every 24 hours  hydroxychloroquine   Oral   vancomycin  IVPB 1000 milliGRAM(s) IV Intermittent every 12 hours  voriconazole 200 milliGRAM(s) Oral every 12 hours    2. GI Prophylaxis:   pantoprazole    Tablet 40 milliGRAM(s) Oral before breakfast    3. Mouth care; Skin care     4. GVHD prophylaxis   tacrolimus 0.5 milliGRAM(s) Oral daily. Follow up tacro levels prior to dose daily     5. Transfuse & replete electrolytes prn     6. IV hydration, daily weights, strict I&O, prn diuresis     7. PO intake as tolerated, nutrition follow up as needed, MVI, folic acid     8. Antiemetics, anti-diarrhea medications:      9. OOB as tolerated, physical therapy consult if needed     10. Monitor coags / fibrinogen 2x week, vitamin K as needed     11. Monitor closely for clinical changes, monitor for fevers     12. Emotional support provided, plan of care discussed and questions addressed      I have written the above note for Dr. Vanessa who performed service with me in the room.   Zulma Chow NP-C ([phone])    I have seen and examined patient with NP, I agree with above note as scribed.

## 2020-04-26 LAB
HCT VFR BLD CALC: 28.1 % — LOW (ref 39–50)
HGB BLD-MCNC: 8.8 G/DL — LOW (ref 13–17)
MCHC RBC-ENTMCNC: 31.3 GM/DL — LOW (ref 32–36)
MCHC RBC-ENTMCNC: 37.4 PG — HIGH (ref 27–34)
MCV RBC AUTO: 119.6 FL — HIGH (ref 80–100)
NRBC # BLD: 0 /100 WBCS — SIGNIFICANT CHANGE UP (ref 0–0)
PLATELET # BLD AUTO: 42 K/UL — LOW (ref 150–400)
RBC # BLD: 2.35 M/UL — LOW (ref 4.2–5.8)
RBC # FLD: 16.5 % — HIGH (ref 10.3–14.5)
TACROLIMUS SERPL-MCNC: 9.5 NG/ML — SIGNIFICANT CHANGE UP
VANCOMYCIN TROUGH SERPL-MCNC: 9.9 UG/ML — LOW (ref 10–20)
WBC # BLD: 8.93 K/UL — SIGNIFICANT CHANGE UP (ref 3.8–10.5)
WBC # FLD AUTO: 8.93 K/UL — SIGNIFICANT CHANGE UP (ref 3.8–10.5)

## 2020-04-26 PROCEDURE — 99231 SBSQ HOSP IP/OBS SF/LOW 25: CPT

## 2020-04-26 PROCEDURE — 99232 SBSQ HOSP IP/OBS MODERATE 35: CPT

## 2020-04-26 RX ORDER — VANCOMYCIN HCL 1 G
750 VIAL (EA) INTRAVENOUS EVERY 12 HOURS
Refills: 0 | Status: DISCONTINUED | OUTPATIENT
Start: 2020-04-26 | End: 2020-04-28

## 2020-04-26 RX ADMIN — VORICONAZOLE 200 MILLIGRAM(S): 10 INJECTION, POWDER, LYOPHILIZED, FOR SOLUTION INTRAVENOUS at 17:55

## 2020-04-26 RX ADMIN — PANTOPRAZOLE SODIUM 40 MILLIGRAM(S): 20 TABLET, DELAYED RELEASE ORAL at 04:14

## 2020-04-26 RX ADMIN — ATOVAQUONE 750 MILLIGRAM(S): 750 SUSPENSION ORAL at 04:12

## 2020-04-26 RX ADMIN — Medication 1 APPLICATION(S): at 04:14

## 2020-04-26 RX ADMIN — CEFEPIME 100 MILLIGRAM(S): 1 INJECTION, POWDER, FOR SOLUTION INTRAMUSCULAR; INTRAVENOUS at 04:12

## 2020-04-26 RX ADMIN — Medication 1 MILLIGRAM(S): at 11:49

## 2020-04-26 RX ADMIN — Medication 400 MILLIGRAM(S): at 17:54

## 2020-04-26 RX ADMIN — URSODIOL 300 MILLIGRAM(S): 250 TABLET, FILM COATED ORAL at 04:15

## 2020-04-26 RX ADMIN — FINASTERIDE 5 MILLIGRAM(S): 5 TABLET, FILM COATED ORAL at 11:49

## 2020-04-26 RX ADMIN — Medication 1 TABLET(S): at 11:49

## 2020-04-26 RX ADMIN — Medication 1 DROP(S): at 17:55

## 2020-04-26 RX ADMIN — ATOVAQUONE 750 MILLIGRAM(S): 750 SUSPENSION ORAL at 17:54

## 2020-04-26 RX ADMIN — TACROLIMUS 0.5 MILLIGRAM(S): 5 CAPSULE ORAL at 11:48

## 2020-04-26 RX ADMIN — Medication 1 DROP(S): at 04:14

## 2020-04-26 RX ADMIN — Medication 1 APPLICATION(S): at 17:55

## 2020-04-26 RX ADMIN — VORICONAZOLE 200 MILLIGRAM(S): 10 INJECTION, POWDER, LYOPHILIZED, FOR SOLUTION INTRAVENOUS at 05:39

## 2020-04-26 RX ADMIN — Medication 400 MILLIGRAM(S): at 04:11

## 2020-04-26 RX ADMIN — SERTRALINE 50 MILLIGRAM(S): 25 TABLET, FILM COATED ORAL at 11:48

## 2020-04-26 RX ADMIN — Medication 10 MILLIGRAM(S): at 04:14

## 2020-04-26 RX ADMIN — URSODIOL 300 MILLIGRAM(S): 250 TABLET, FILM COATED ORAL at 17:55

## 2020-04-26 RX ADMIN — CEFEPIME 100 MILLIGRAM(S): 1 INJECTION, POWDER, FOR SOLUTION INTRAMUSCULAR; INTRAVENOUS at 17:54

## 2020-04-26 RX ADMIN — Medication 250 MILLIGRAM(S): at 17:55

## 2020-04-26 RX ADMIN — TAMSULOSIN HYDROCHLORIDE 0.4 MILLIGRAM(S): 0.4 CAPSULE ORAL at 22:29

## 2020-04-26 NOTE — PROGRESS NOTE ADULT - ATTENDING COMMENTS
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 Airborne isolation  Tacro level= 8.9 on 4/24; BC(4/23)- negative to date  Plan-  Patient immunosuppressed s/p allogeneic PSCT, on Tacrolimus. Agree with treatment for possible bacterial pneumonia with Cefepime/Vanco IV.   Continue Acyclovir, Mepron, Vfend  Continue Prednisone 10 mg po daily.   Continue Tacrolimus, check level daily - labs today pending  Continue Kyle s/p transplant  Continue Folate, MVI, PPI  Continue O2 supplement, monitor O2 sats 59 y/o M with PMhx of acute myeloid leukemia s/p haplo identical PSCT 10/2019, DVT/PE (not on AC due to thrombocytopenia), HTN, factor V Leiden and HFrEF presented to the ED for SOB, hypoxia with pulmonary infiltrate on CXR c/w pneumonia, COVID negative; briefly on pressors in ER  Patient's clinical course suspicious for COVID-19 infection, therefore he was started on Hydroxychloroquine; COVID PCR reordered- negative x 3. Airborne isolation  Tacro level= 8.9 on 4/24; BC(4/23)- negative to date  Plan-  Patient immunosuppressed s/p allogeneic PSCT, on Tacrolimus. Agree with treatment for possible bacterial pneumonia with Cefepime/Vanco IV.   Continue Acyclovir, Mepron, Vfend  Continue Prednisone 10 mg po daily.   Continue Tacrolimus, check level daily, stable level 8.9 on 4/25  Continue Kyle s/p transplant  Continue Folate, MVI, PPI  Continue O2 supplement, monitor O2 sats

## 2020-04-26 NOTE — PROGRESS NOTE ADULT - PROBLEM SELECTOR PLAN 1
requiring non rebreather   COVID PCR x 3 negative  nasal pcr for staph aureus/MRSA - negative ; can likely stop Vancomycin, awaiting ID input   Continue cefepime 1q12 for now   f/u blood cx, urine cx

## 2020-04-26 NOTE — PROGRESS NOTE ADULT - SUBJECTIVE AND OBJECTIVE BOX
[facility] Division of Hospital Medicine Progress Note    Patient is a 60y old  Male who presents with a chief complaint of SOB (26 Apr 2020 11:02)      SUBJECTIVE / OVERNIGHT EVENTS: was deployed to inpatient care due to covid Pandemic   ADDITIONAL REVIEW OF SYSTEMS: saw pt this morning , doing much better , appetite better , breathing better , denies any CP, SOB or N/V , cough better , still recquirning oxygen via NRB due to Hypoxia     MEDICATIONS  (STANDING):  acyclovir   Oral Tab/Cap 400 milliGRAM(s) Oral two times a day  atovaquone Suspension 750 milliGRAM(s) Oral two times a day  cefepime   IVPB 2000 milliGRAM(s) IV Intermittent every 12 hours  finasteride 5 milliGRAM(s) Oral daily  folic acid 1 milliGRAM(s) Oral daily  multivitamin 1 Tablet(s) Oral daily  pantoprazole    Tablet 40 milliGRAM(s) Oral before breakfast  predniSONE   Tablet 10 milliGRAM(s) Oral daily  sertraline 50 milliGRAM(s) Oral daily  tacrolimus 0.5 milliGRAM(s) Oral daily  tamsulosin 0.4 milliGRAM(s) Oral at bedtime  triamcinolone 0.1% Cream 1 Application(s) Topical two times a day  trimethoprim/polymyxin Solution 1 Drop(s) Both EYES two times a day  ursodiol Capsule 300 milliGRAM(s) Oral two times a day  vancomycin  IVPB 750 milliGRAM(s) IV Intermittent every 12 hours  voriconazole 200 milliGRAM(s) Oral every 12 hours    MEDICATIONS  (PRN):  sodium chloride 0.65% Nasal 1 Spray(s) Both Nostrils three times a day PRN Nasal Congestion      CAPILLARY BLOOD GLUCOSE        I&O's Summary    25 Apr 2020 07:01  -  26 Apr 2020 07:00  --------------------------------------------------------  IN: 360 mL / OUT: 300 mL / NET: 60 mL        PHYSICAL EXAM:  Vital Signs Last 24 Hrs  T(C): 36.7 (26 Apr 2020 06:13), Max: 36.7 (26 Apr 2020 06:13)  T(F): 98.1 (26 Apr 2020 06:13), Max: 98.1 (26 Apr 2020 06:13)  HR: 98 (26 Apr 2020 06:13) (98 - 107)  BP: 103/57 (26 Apr 2020 06:13) (103/57 - 119/73)  BP(mean): --  RR: 18 (26 Apr 2020 06:13) (18 - 20)  SpO2: 99% (26 Apr 2020 06:13) (97% - 99%)  CONSTITUTIONAL: NAD, well-developed, well-groomed, resting comfortably in bed on NRB   ENMT: Moist oral mucosa, no pharyngeal injection or exudates; normal dentition  RESPIRATORY: poor respiratory effort; lungs base rales  bilaterally, decrease entry lung bases   CARDIOVASCULAR: Regular rate and rhythm, normal S1 and S2, no murmur/rub/gallop; No lower extremity edema; Peripheral pulses are 2+ bilaterally  ABDOMEN: Nontender to palpation, normoactive bowel sounds, no rebound/guarding; No hepatosplenomegaly  PSYCH: A+O to person, place, and time; affect appropriate  NEUROLOGY: CN 2-12 are intact and symmetric; no gross sensory deficits   SKIN: No rashes; no palpable lesions  ext - trace pedal edema     LABS:                        8.8    8.93  )-----------( 42       ( 26 Apr 2020 06:32 )             28.1     04-25    137  |  100  |  22  ----------------------------<  130<H>  4.7   |  21<L>  |  0.94    Ca    9.1      25 Apr 2020 14:22  Phos  3.8     04-25  Mg     1.6     04-25    TPro  5.9<L>  /  Alb  3.4  /  TBili  0.5  /  DBili  x   /  AST  11  /  ALT  9<L>  /  AlkPhos  79  04-25    Vancomycin Level, Trough (04.26.20 @ 06:32)    Vancomycin Level, Trough: 9.9: Vancomycin trough levels should be rapidly reached and maintained at  15-20 ug/ml for life threatening MRSA  infections such as sepsis, endocarditis, osteomyelitis and pneumonia. A  first trough level should be drawn  before the 3rd or 4th dose.  Risk of renal toxicity is increased for levels >15 ug/ml, in patients on  other nephrotoxic drugs, who are  hemodynamically unstable, have unstable renal function, or are on  Vancomycin therapy for >14 days. Renal function with  creatinine levels should be monitored for those patients. ug/mL    Tacrolimus (), Serum: 9.5: Tacrolimus testing is performed on the [employer]  by  chemiluminescent microparticle immunoassay. The therapeutic range of  tacrolimus is not clearly defined but target 12-hour trough whole blood  concentrations are 5.0 - 20.0 ng/mL early post transplant. Twenty-four  hour  trough concentrations are 33-50% less than the corresponding 12-hour  trough. ng/mL (04.26.20 @ 08:59)          COVID-19 PCR: NotDetec (04-23-20 @ 13:56)  COVID-19 PCR: NotDetec (04-22-20 @ 20:58)  COVID-19 PCR: NotDetec (04-22-20 @ 17:50)      RADIOLOGY & ADDITIONAL TESTS:  Imaging from Last 24 Hours:  Electrocardiogram/QTc Interval:    COORDINATION OF CARE:  Care Discussed with Consultants/Other Providers:

## 2020-04-26 NOTE — PROGRESS NOTE ADULT - ASSESSMENT
· Assessment		  60 m with HTN, AML s/p bone marrow transplant 10/2019, DVT/PE (not on AC due to thrombocytopenia),  factor V Leiden and HFrEF, BIBEMS for dyspnea was hypotensive and required levophed by EMS but responded to fluids  today afebrile /66 spo2 94 to 96 on NRB  leukocytosis 19 improved to normal 8.93 after antibiotics  COVID negative x 3  CXR: b/l opacities, R>L    # hypoxic respiratory failure and b/l pneumonia but COVID negative x 3 ?bacterial pneumonia in the setting on AML s/o BMT 10/2019, WBC improved to normal on antibiotics  urine legionella negative, MRSA swab negative, blood cx negative so far   -ID and hematology consult appreciated   -check sputum cx- pending collection   -QTc prolong and pt also on voriconazole, Plaquenil discontinued   -c/w vanco 1 q 12 and cefepime 2 q 12  - f/u vanco trough-elevated vanco held  -c/w mepron, voriconazole and acyclovir ppx  -still hypoxic requiring O2 supplement -continue monitor the O2sat goal 92-96 %--today spo2 97 to 99 on NRB , titrate oxygen a tolerated down   -- advised out of bed to chair   -- pt evaluation   -needs daily CBC with diff and Tacrolimus levels, CMp    #loss of appetite- improving -encouraged ensure - pt  requested Chocolate flavor     DVT prophylaxis - not on anticoagulation due to thrombocytopenia- advised leg and foot movements OOB to chair encouraged     Spoke with Pt Emergency contact -Roommate Roger (nurse)  on phone update pt condition 4/25th , called left message x 2 on 4/26th · Assessment		  60 m with HTN, AML s/p bone marrow transplant 10/2019, DVT/PE (not on AC due to thrombocytopenia),  factor V Leiden and HFrEF, BIBEMS for dyspnea was hypotensive and required levophed by EMS but responded to fluids  today afebrile /66 spo2 94 to 96 on NRB  leukocytosis 19 improved to normal 8.93 after antibiotics  COVID negative x 3  CXR: b/l opacities, R>L    # hypoxic respiratory failure and b/l pneumonia but COVID negative x 3 ?bacterial pneumonia in the setting on AML s/o BMT 10/2019, WBC improved to normal on antibiotics  urine legionella negative, MRSA swab negative, blood cx negative so far   -ID and hematology consult appreciated   -check sputum cx- pending collection   -QTc prolong and pt also on voriconazole, Plaquenil discontinued   -c/w vanco 1 q 12 and cefepime 2 q 12  - f/u vanco trough-elevated on 4/25 vanco held, 4/26 9.9 restarted vanco at 750   -c/w mepron, voriconazole and acyclovir ppx  -still hypoxic requiring O2 supplement -continue monitor the O2sat goal 92-96 %--today spo2 97 to 99 on NRB , titrate oxygen a tolerated down   -- advised out of bed to chair   -- pt evaluation   -needs daily CBC with diff and Tacrolimus levels, CMp    #loss of appetite- improving -encouraged ensure - pt  requested Chocolate flavor     DVT prophylaxis - not on anticoagulation due to thrombocytopenia- advised leg and foot movements OOB to chair encouraged     Spoke with Pt Emergency contact -Roommate Roger (nurse)  on phone update pt condition 4/25th , called left message x 2 on 4/26th , spoke with Roger updated pt condition afternoon 1 pm

## 2020-04-26 NOTE — PROGRESS NOTE ADULT - SUBJECTIVE AND OBJECTIVE BOX
[facility] Transplant Team                                                      Critical / Counseling Time Provided: 30 minutes                                                                                                                                                        Chief Complaint: Shortness of breath     O: Vitals:   Vital Signs Last 24 Hrs  T(C): 36.7 (26 Apr 2020 06:13), Max: 36.7 (26 Apr 2020 06:13)  T(F): 98.1 (26 Apr 2020 06:13), Max: 98.1 (26 Apr 2020 06:13)  HR: 98 (26 Apr 2020 06:13) (98 - 107)  BP: 103/57 (26 Apr 2020 06:13) (103/57 - 119/73)  BP(mean): --  RR: 18 (26 Apr 2020 06:13) (18 - 20)  SpO2: 99% (26 Apr 2020 06:13) (97% - 99%)    Admit weight: 90.7 kg   Today's weight: pending     Intake / Output:   04-25 @ 07:01  -  04-26 @ 07:00  --------------------------------------------------------  IN: 360 mL / OUT: 300 mL / NET: 60 mL    PE:   Please refer to primary team exam, as patient with high likelihood of COVID-19, although test negative.   CONSTITUTIONAL: NAD, well-developed, well-groomed, on NRB MAsk   ENMT: Moist oral mucosa, no pharyngeal injection or exudates; normal dentition  RESPIRATORY: Normal respiratory effort; lungs are clear to auscultation bilaterally  CARDIOVASCULAR: Regular rate and rhythm, normal S1 and S2, no murmur/rub/gallop; No lower extremity edema; Peripheral pulses are 2+ bilaterally  ABDOMEN: Nontender to palpation, normoactive bowel sounds, no rebound/guarding; No hepatosplenomegaly  PSYCH: A+O to person, place, and time; affect appropriate  NEUROLOGY: CN 2-12 are intact and symmetric; no gross sensory deficits   SKIN: No rashes; no palpable lesions      Labs:   CBC Full  -  ( 26 Apr 2020 06:32 )  WBC Count : 8.93 K/uL  Hemoglobin : 8.8 g/dL  Hematocrit : 28.1 %  Platelet Count - Automated : 42 K/uL  Mean Cell Volume : 119.6 fl  Mean Cell Hemoglobin : 37.4 pg  Mean Cell Hemoglobin Concentration : 31.3 gm/dL  Auto Neutrophil # : x  Auto Lymphocyte # : x  Auto Monocyte # : x  Auto Eosinophil # : x  Auto Basophil # : x  Auto Neutrophil % : x  Auto Lymphocyte % : x  Auto Monocyte % : x  Auto Eosinophil % : x  Auto Basophil % : x                          8.8    8.93  )-----------( 42       ( 26 Apr 2020 06:32 )             28.1     04-25    137  |  100  |  22  ----------------------------<  130<H>  4.7   |  21<L>  |  0.94    Ca    9.1      25 Apr 2020 14:22  Phos  3.8     04-25  Mg     1.6     04-25    TPro  5.9<L>  /  Alb  3.4  /  TBili  0.5  /  DBili  x   /  AST  11  /  ALT  9<L>  /  AlkPhos  79  04-25      LIVER FUNCTIONS - ( 25 Apr 2020 14:22 )  Alb: 3.4 g/dL / Pro: 5.9 g/dL / ALK PHOS: 79 U/L / ALT: 9 U/L / AST: 11 U/L / GGT: x                04-26 @ 08:59  Tacrolimus 9.5                Cyclosporine --   04-25 @ 15:40  Tacrolimus 8.5                Cyclosporine --    Cultures:   Culture - Blood (04.23.20 @ 00:48)    Specimen Source: .Blood Blood-Venous    Culture Results:   No growth to date.    Radiology:   < from: Xray Chest 1 View- PORTABLE-Urgent (04.22.20 @ 17:56) >  IMPRESSION:   Bilateral nonspecific hazy opacities, right greater than left. Differential includes viral pneumonia secondary to COVID-19.    Meds:   Antimicrobials:   acyclovir   Oral Tab/Cap 400 milliGRAM(s) Oral two times a day  atovaquone Suspension 750 milliGRAM(s) Oral two times a day  cefepime   IVPB 2000 milliGRAM(s) IV Intermittent every 12 hours  vancomycin  IVPB 750 milliGRAM(s) IV Intermittent every 12 hours  voriconazole 200 milliGRAM(s) Oral every 12 hours    GI:  pantoprazole    Tablet 40 milliGRAM(s) Oral before breakfast  ursodiol Capsule 300 milliGRAM(s) Oral two times a day    Cardiovascular:   tamsulosin 0.4 milliGRAM(s) Oral at bedtime    Immunologic:   tacrolimus 0.5 milliGRAM(s) Oral daily    Other medications:   finasteride 5 milliGRAM(s) Oral daily  folic acid 1 milliGRAM(s) Oral daily  multivitamin 1 Tablet(s) Oral daily  predniSONE   Tablet 10 milliGRAM(s) Oral daily  sertraline 50 milliGRAM(s) Oral daily  triamcinolone 0.1% Cream 1 Application(s) Topical two times a day  trimethoprim/polymyxin Solution 1 Drop(s) Both EYES two times a day    PRN:   sodium chloride 0.65% Nasal 1 Spray(s) Both Nostrils three times a day PRN    A/P: 60 year old male with a history of AML  Status Post : 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 He is now admitted with shortness of breath, and found to have likely viral pneumonia on CXR. He is COVID negative x 3    1. Infectious Disease:   acyclovir   Oral Tab/Cap 400 milliGRAM(s) Oral two times a day  atovaquone Suspension 750 milliGRAM(s) Oral two times a day  cefepime   IVPB 2000 milliGRAM(s) IV Intermittent every 12 hours  vancomycin  IVPB 750 milliGRAM(s) IV Intermittent every 12 hours  voriconazole 200 milliGRAM(s) Oral every 12 hours    2. GI Prophylaxis:   pantoprazole    Tablet 40 milliGRAM(s) Oral before breakfast    3. Mouth care; Skin care     4. GVHD prophylaxis   tacrolimus 0.5 milliGRAM(s) Oral daily. Follow up tacro levels prior to dose daily     5. Transfuse & replete electrolytes prn     6. IV hydration, daily weights, strict I&O, prn diuresis     7. PO intake as tolerated, nutrition follow up as needed, MVI, folic acid     8. Antiemetics, anti-diarrhea medications:      9. OOB as tolerated, physical therapy consult if needed     10. Monitor coags / fibrinogen 2x week, vitamin K as needed     11. Monitor closely for clinical changes, monitor for fevers     12. Emotional support provided, plan of care discussed and questions addressed      I have written the above note for Dr. Vanessa who performed service with me in the room.   Zulma Chow NP-C ([phone])    I have seen and examined patient with NP, I agree with above note as scribed.

## 2020-04-26 NOTE — CHART NOTE - NSCHARTNOTEFT_GEN_A_CORE
VANCO DOSING    Pt trough noted to be 9.9. Will re dose Vanco at 750mg q 12 and check trough prior to 4th dose which will be Mon 4/27 in PM.    Ruben Frazier, YADIRA  x [phone]

## 2020-04-26 NOTE — PROGRESS NOTE ADULT - ASSESSMENT
60 year old male with a history of AML, status Post : 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 He is now admitted with shortness of breath, and found to have likely viral pneumonia on CXR. He is COVID negative x 3.

## 2020-04-27 LAB
ALBUMIN SERPL ELPH-MCNC: 3.2 G/DL — LOW (ref 3.3–5)
ALP SERPL-CCNC: 72 U/L — SIGNIFICANT CHANGE UP (ref 40–120)
ALT FLD-CCNC: 8 U/L — LOW (ref 10–45)
ANION GAP SERPL CALC-SCNC: 13 MMOL/L — SIGNIFICANT CHANGE UP (ref 5–17)
APTT BLD: 27.4 SEC — LOW (ref 27.5–36.3)
AST SERPL-CCNC: 13 U/L — SIGNIFICANT CHANGE UP (ref 10–40)
BASOPHILS # BLD AUTO: 0.01 K/UL — SIGNIFICANT CHANGE UP (ref 0–0.2)
BASOPHILS NFR BLD AUTO: 0.1 % — SIGNIFICANT CHANGE UP (ref 0–2)
BILIRUB SERPL-MCNC: 0.4 MG/DL — SIGNIFICANT CHANGE UP (ref 0.2–1.2)
BUN SERPL-MCNC: 24 MG/DL — HIGH (ref 7–23)
CALCIUM SERPL-MCNC: 8.9 MG/DL — SIGNIFICANT CHANGE UP (ref 8.4–10.5)
CHLORIDE SERPL-SCNC: 101 MMOL/L — SIGNIFICANT CHANGE UP (ref 96–108)
CMV DNA CSF QL NAA+PROBE: SIGNIFICANT CHANGE UP
CMV DNA SPEC NAA+PROBE-LOG#: SIGNIFICANT CHANGE UP LOG10IU/ML
CO2 SERPL-SCNC: 24 MMOL/L — SIGNIFICANT CHANGE UP (ref 22–31)
CREAT SERPL-MCNC: 0.98 MG/DL — SIGNIFICANT CHANGE UP (ref 0.5–1.3)
EOSINOPHIL # BLD AUTO: 0.08 K/UL — SIGNIFICANT CHANGE UP (ref 0–0.5)
EOSINOPHIL NFR BLD AUTO: 0.9 % — SIGNIFICANT CHANGE UP (ref 0–6)
FIBRINOGEN PPP-MCNC: 828 MG/DL — HIGH (ref 350–510)
GLUCOSE SERPL-MCNC: 124 MG/DL — HIGH (ref 70–99)
HCT VFR BLD CALC: 27.1 % — LOW (ref 39–50)
HGB BLD-MCNC: 8.5 G/DL — LOW (ref 13–17)
IMM GRANULOCYTES NFR BLD AUTO: 0.7 % — SIGNIFICANT CHANGE UP (ref 0–1.5)
INR BLD: 1.49 RATIO — HIGH (ref 0.88–1.16)
LYMPHOCYTES # BLD AUTO: 2.95 K/UL — SIGNIFICANT CHANGE UP (ref 1–3.3)
LYMPHOCYTES # BLD AUTO: 34.8 % — SIGNIFICANT CHANGE UP (ref 13–44)
MAGNESIUM SERPL-MCNC: 1.6 MG/DL — SIGNIFICANT CHANGE UP (ref 1.6–2.6)
MCHC RBC-ENTMCNC: 31.4 GM/DL — LOW (ref 32–36)
MCHC RBC-ENTMCNC: 36.8 PG — HIGH (ref 27–34)
MCV RBC AUTO: 117.3 FL — HIGH (ref 80–100)
MONOCYTES # BLD AUTO: 0.66 K/UL — SIGNIFICANT CHANGE UP (ref 0–0.9)
MONOCYTES NFR BLD AUTO: 7.8 % — SIGNIFICANT CHANGE UP (ref 2–14)
NEUTROPHILS # BLD AUTO: 4.72 K/UL — SIGNIFICANT CHANGE UP (ref 1.8–7.4)
NEUTROPHILS NFR BLD AUTO: 55.7 % — SIGNIFICANT CHANGE UP (ref 43–77)
NRBC # BLD: 0 /100 WBCS — SIGNIFICANT CHANGE UP (ref 0–0)
PHOSPHATE SERPL-MCNC: 2.7 MG/DL — SIGNIFICANT CHANGE UP (ref 2.5–4.5)
PLATELET # BLD AUTO: 43 K/UL — LOW (ref 150–400)
POTASSIUM SERPL-MCNC: 4.9 MMOL/L — SIGNIFICANT CHANGE UP (ref 3.5–5.3)
POTASSIUM SERPL-SCNC: 4.9 MMOL/L — SIGNIFICANT CHANGE UP (ref 3.5–5.3)
PROT SERPL-MCNC: 5.5 G/DL — LOW (ref 6–8.3)
PROTHROM AB SERPL-ACNC: 17.3 SEC — HIGH (ref 10–12.9)
RBC # BLD: 2.31 M/UL — LOW (ref 4.2–5.8)
RBC # FLD: 16.1 % — HIGH (ref 10.3–14.5)
SODIUM SERPL-SCNC: 138 MMOL/L — SIGNIFICANT CHANGE UP (ref 135–145)
TACROLIMUS SERPL-MCNC: 8.2 NG/ML — SIGNIFICANT CHANGE UP
VANCOMYCIN TROUGH SERPL-MCNC: 12.2 UG/ML — SIGNIFICANT CHANGE UP (ref 10–20)
WBC # BLD: 8.48 K/UL — SIGNIFICANT CHANGE UP (ref 3.8–10.5)
WBC # FLD AUTO: 8.48 K/UL — SIGNIFICANT CHANGE UP (ref 3.8–10.5)

## 2020-04-27 PROCEDURE — 99233 SBSQ HOSP IP/OBS HIGH 50: CPT

## 2020-04-27 PROCEDURE — 99232 SBSQ HOSP IP/OBS MODERATE 35: CPT

## 2020-04-27 PROCEDURE — 99231 SBSQ HOSP IP/OBS SF/LOW 25: CPT | Mod: GC

## 2020-04-27 RX ADMIN — Medication 1 APPLICATION(S): at 17:57

## 2020-04-27 RX ADMIN — VORICONAZOLE 200 MILLIGRAM(S): 10 INJECTION, POWDER, LYOPHILIZED, FOR SOLUTION INTRAVENOUS at 05:23

## 2020-04-27 RX ADMIN — Medication 1 TABLET(S): at 11:36

## 2020-04-27 RX ADMIN — ATOVAQUONE 750 MILLIGRAM(S): 750 SUSPENSION ORAL at 05:22

## 2020-04-27 RX ADMIN — TACROLIMUS 0.5 MILLIGRAM(S): 5 CAPSULE ORAL at 11:36

## 2020-04-27 RX ADMIN — CEFEPIME 100 MILLIGRAM(S): 1 INJECTION, POWDER, FOR SOLUTION INTRAMUSCULAR; INTRAVENOUS at 05:22

## 2020-04-27 RX ADMIN — PANTOPRAZOLE SODIUM 40 MILLIGRAM(S): 20 TABLET, DELAYED RELEASE ORAL at 05:23

## 2020-04-27 RX ADMIN — CEFEPIME 100 MILLIGRAM(S): 1 INJECTION, POWDER, FOR SOLUTION INTRAMUSCULAR; INTRAVENOUS at 18:53

## 2020-04-27 RX ADMIN — Medication 400 MILLIGRAM(S): at 18:53

## 2020-04-27 RX ADMIN — FINASTERIDE 5 MILLIGRAM(S): 5 TABLET, FILM COATED ORAL at 11:36

## 2020-04-27 RX ADMIN — SERTRALINE 50 MILLIGRAM(S): 25 TABLET, FILM COATED ORAL at 11:36

## 2020-04-27 RX ADMIN — Medication 400 MILLIGRAM(S): at 05:22

## 2020-04-27 RX ADMIN — ATOVAQUONE 750 MILLIGRAM(S): 750 SUSPENSION ORAL at 18:53

## 2020-04-27 RX ADMIN — Medication 1 MILLIGRAM(S): at 11:36

## 2020-04-27 RX ADMIN — URSODIOL 300 MILLIGRAM(S): 250 TABLET, FILM COATED ORAL at 05:23

## 2020-04-27 RX ADMIN — URSODIOL 300 MILLIGRAM(S): 250 TABLET, FILM COATED ORAL at 18:54

## 2020-04-27 RX ADMIN — TAMSULOSIN HYDROCHLORIDE 0.4 MILLIGRAM(S): 0.4 CAPSULE ORAL at 21:10

## 2020-04-27 RX ADMIN — Medication 1 DROP(S): at 17:57

## 2020-04-27 RX ADMIN — VORICONAZOLE 200 MILLIGRAM(S): 10 INJECTION, POWDER, LYOPHILIZED, FOR SOLUTION INTRAVENOUS at 18:54

## 2020-04-27 RX ADMIN — Medication 10 MILLIGRAM(S): at 05:23

## 2020-04-27 RX ADMIN — Medication 250 MILLIGRAM(S): at 05:23

## 2020-04-27 RX ADMIN — Medication 250 MILLIGRAM(S): at 18:54

## 2020-04-27 NOTE — PROGRESS NOTE ADULT - ASSESSMENT
· Assessment		  60 m with HTN, AML s/p bone marrow transplant 10/2019, DVT/PE (not on AC due to thrombocytopenia),  factor V Leiden and HFrEF, BIBEMS for dyspnea was hypotensive and required levophed by EMS but responded to fluids  today afebrile ,spo2 98% on 10L NRB  leukocytosis  improved  after antibiotics  COVID negative x 3  CXR: b/l opacities, R>L  Bld and Ucxs neg    # hypoxic respiratory failure and b/l pneumonia but COVID negative x 3 ?false neg and/or  ?bacterial pneumonia in the setting on AML s/o BMT 10/2019, WBC improved to normal on antibiotics  urine legionella negative, MRSA swab negative, blood cx negative  -ID and hematology consult appreciated   -check sputum cx- pending collection   -QTc prolong and pt also on voriconazole, Plaquenil discontinued   -c/w vanco 1 q 12 and cefepime 2 q 12  - f/u vanco trough-elevated on 4/25 vanco held, 4/26 9.9 restarted vanco at 750   -c/w mepron, voriconazole and acyclovir ppx  -still hypoxic requiring O2 supplement -continue monitor the O2sat goal 92-96 %--today spo2 97 to 99 on NRB , titrate oxygen a tolerated down   -- advised out of bed to chair   -- pt evaluation   -needs daily CBC with diff and Tacrolimus levels, CMp    #loss of appetite- improving -encouraged ensure - pt  requested Chocolate flavor     DVT prophylaxis - not on anticoagulation due to thrombocytopenia- advised leg and foot movements OOB to chair encouraged     Spoke with Pt Emergency contact -Roommate Roger (nurse)  on phone update pt condition 4/25th , called left message x 2 on 4/26th , spoke with Roger updated pt condition afternoon 1 pm

## 2020-04-27 NOTE — DIETITIAN INITIAL EVALUATION ADULT. - OTHER INFO
Chart Reviewed, events noted. This is a 60 m with HTN, AML s/p bone marrow transplant 10/2019, DVT/PE (not on AC due to thrombocytopenia),  factor V Leiden and HFrEF, BIBEMS for dyspnea was hypotensive, COVID negative x 3 still on isolation precautions.     Unable to conduct a face to face interview or nutrition-focused physical exam due to limited contact restrictions related to patient's medical condition and isolation precaution. Spoke with patient via phone call and spoke with RN. Patient reports good PO intake and appetite PTA, states he was eating more than usual due to steroids. Confirms NKFA, consumes regular diet. Pt denies chewing/swallowing difficulty, nausea, vomiting, reporting a little bit of diarrhea in-house, last BM 4/24. Takes multivitamin at home per report.     Weights: pt endorses weight gain, reports UBW as 180lbs back in January, current dosing weight is ~200lbs. Per previous RD note in October 2019 pt noted with weight of 179.6lbs, with history of weight loss prior to that, now with weight regain.     In-house pt reports decreased PO intake and appetite, mostly drinking just Ensure supplements which RN confirms. Discussed with patient importance of PO intake and prioritizing sources of protein to maintain lean body mass. Also discussed importance of limiting sodium in the diet at home. Obtained additional food preferences, will honor as able. Pt with no nutrition related questions at this time. Pt made aware RD remains available as needed.

## 2020-04-27 NOTE — PROGRESS NOTE ADULT - ASSESSMENT
60 m with HTN, AML s/p bone marrow transplant 10/2019, DVT/PE (not on AC due to thrombocytopenia),  factor V Leiden and HFrEF, BIBEMS for dyspnea was hypotensive and required levophed by EMS but responded to fluids  here febrile to 100.1, BP in 80s-90s, tachycardic and tachypneic on NRB  leukocytosis 19 improved to 13 after antibiotics  COVID negative x 2  CXR: b/l opacities, R>L    fever, tachycardia, hypotension, leukocytosis, septic shock  hypoxic respiratory failure and b/l pneumonia but COVID negative x 3 ?bacterial pneumonia in the setting on AML s/o BMT 10/2019, WBC improved on antibiotics  urine legionella negative, MRSA swab negative, blood cx negative    * pt clinically better and WBC normalized on vanco and cefepime  * QTc prolong and pt also on voriconazole, so did not receive plaquenil  * c/w vanco 750 q 12 and cefepime 2 q 12, started 4/22, now day 6, will complete a 7 day course  * check vanco trough tomorrow and monitor renal function to prevent nephrotoxicity  * c/w mepron, voriconazole and acyclovir ppx  * monitor the O2sat and taper O2 as tolerated    The above assessment and plan was discussed with natanael Faith MD  Pager [phone]  After 5pm and on weekends call [phone] .

## 2020-04-27 NOTE — DIETITIAN INITIAL EVALUATION ADULT. - PROBLEM SELECTOR PLAN 5
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

## 2020-04-27 NOTE — PROGRESS NOTE ADULT - ATTENDING COMMENTS
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 Airborne isolation  Tacro level= 8.9 on 4/24; BC(4/23)- negative to date  Plan-  Patient immunosuppressed s/p allogeneic PSCT, on Tacrolimus. Agree with treatment for possible bacterial pneumonia with Cefepime/Vanco IV.   Continue Acyclovir, Mepron, Vfend  Continue Prednisone 10 mg po daily.   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 Patient's clinical course very suspicious for COVID-19 infection, therefore he was started on Hydroxychloroquine; COVID PCR reordered and now is negative x 3. He remains on airborne isolation  Tacro level= 8.9 on 4/24; BC(4/23)- negative to date    Plan-  Patient immunosuppressed s/p allogeneic PSCT, on Tacrolimus. Agree with treatment for possible bacterial pneumonia with Cefepime/Vanco IV.  Continue Acyclovir, Mepron, Vfend for prophylaxis.  Continue Prednisone 10 mg po daily.   Continue Tacrolimus, check level daily, 6.5 today. Therapeutic.   Continue Kyle s/p transplant  Continue Folate, MVI, PPI  Continue O2 supplement, monitor O2 sats. Requiring high nasal canula requirement (6L). Titrate down as possible for goal O2 92-96%.

## 2020-04-27 NOTE — DIETITIAN INITIAL EVALUATION ADULT. - PROBLEM SELECTOR PLAN 4
Non-ischemic cardiomyopathy (EF 25% 9/19/19) from chemotherapy. Taking Lasix 20 daily, had increased dose due to SOB. s/p 2L in the ED  - holding metoprolol and Lasix hypotension  - Strict I&Os  - Daily Standing weights

## 2020-04-27 NOTE — DIETITIAN INITIAL EVALUATION ADULT. - PROBLEM SELECTOR PLAN 1
Pt presented with hypoxia, requiring NRB. CXR showing opacities consistent with COVID-19, however COVID-19 swab negative x2. Elevated procalcitonin >2  - Plan as above  - Urine legionella

## 2020-04-27 NOTE — PROGRESS NOTE ADULT - SUBJECTIVE AND OBJECTIVE BOX
HPC Transplant Team                                                      Critical / Counseling Time Provided: 30 minutes                                                                                                                                                        Chief Complaint: Shortness of breath     S: Patient seen and examined with [facility] Transplant Team:   + fatigue   + dyspnea   + occasional cough       O: Vitals:   Vital Signs Last 24 Hrs  T(C): 36.7 (2020 12:31), Max: 37.6 (2020 04:32)  T(F): 98 (2020 12:31), Max: 99.6 (2020 04:32)  HR: 93 (2020 12:31) (86 - 108)  BP: 110/70 (2020 12:31) (100/57 - 130/84)  BP(mean): --  RR: 20 (2020 12:31) (18 - 22)  SpO2: 98% (2020 13:18) (93% - 98%)    Admit weight: 90.7 kg   Daily Weight in k.7 (2020 11:32)    Intake / Output:   - @ 07:01  -   @ 07:00  --------------------------------------------------------  IN: 300 mL / OUT: 250 mL / NET: 50 mL        PE:   Please refer to primary team exam, as patient with high likelihood of COVID-19, although test negative.   CONSTITUTIONAL: NAD, well-developed, well-groomed, on NRB MAsk   ENMT: Moist oral mucosa, no pharyngeal injection or exudates; normal dentition  RESPIRATORY: Normal respiratory effort; lungs are clear to auscultation bilaterally  CARDIOVASCULAR: Regular rate and rhythm, normal S1 and S2, no murmur/rub/gallop; No lower extremity edema; Peripheral pulses are 2+ bilaterally  ABDOMEN: Nontender to palpation, normoactive bowel sounds, no rebound/guarding; No hepatosplenomegaly  PSYCH: A+O to person, place, and time; affect appropriate  NEUROLOGY: CN 2-12 are intact and symmetric; no gross sensory deficits   SKIN: No rashes; no palpable lesions      Labs:   CBC Full  -  ( 2020 04:59 )  WBC Count : 8.48 K/uL  Hemoglobin : 8.5 g/dL  Hematocrit : 27.1 %  Platelet Count - Automated : 43 K/uL  Mean Cell Volume : 117.3 fl  Mean Cell Hemoglobin : 36.8 pg  Mean Cell Hemoglobin Concentration : 31.4 gm/dL  Auto Neutrophil # : 4.72 K/uL  Auto Lymphocyte # : 2.95 K/uL  Auto Monocyte # : 0.66 K/uL  Auto Eosinophil # : 0.08 K/uL  Auto Basophil # : 0.01 K/uL  Auto Neutrophil % : 55.7 %  Auto Lymphocyte % : 34.8 %  Auto Monocyte % : 7.8 %  Auto Eosinophil % : 0.9 %  Auto Basophil % : 0.1 %                          8.5    8.48  )-----------( 43       ( 2020 04:59 )             27.1         138  |  101  |  24<H>  ----------------------------<  124<H>  4.9   |  24  |  0.98    Ca    8.9      2020 04:59  Phos  2.7       Mg     1.6         TPro  5.5<L>  /  Alb  3.2<L>  /  TBili  0.4  /  DBili  x   /  AST  13  /  ALT  8<L>  /  AlkPhos  72  27    PT/INR - ( 2020 04:59 )   PT: 17.3 sec;   INR: 1.49 ratio         PTT - ( 2020 04:59 )  PTT:27.4 sec  LIVER FUNCTIONS - ( 2020 04:59 )  Alb: 3.2 g/dL / Pro: 5.5 g/dL / ALK PHOS: 72 U/L / ALT: 8 U/L / AST: 13 U/L / GGT: x                 @ 08:11  Tacrolimus 8.2                Cyclosporine --    Cultures:   Culture Results: blood  No growth to date. (20 @ 00:48)    Culture - Blood (20 @ 00:48)    Specimen Source: .Blood Blood-Venous    Culture Results:   No growth to date.    Culture Results: urine  No growth (20 @ 22:19)    Radiology:   < from: Xray Chest 1 View- PORTABLE-Urgent (20 @ 17:56) >  IMPRESSION:   Bilateral nonspecific hazy opacities, right greater than left. Differential includes viral pneumonia secondary to COVID-19.    Meds:   Antimicrobials:   acyclovir   Oral Tab/Cap 400 milliGRAM(s) Oral two times a day  atovaquone Suspension 750 milliGRAM(s) Oral two times a day  cefepime   IVPB 2000 milliGRAM(s) IV Intermittent every 12 hours  vancomycin  IVPB 750 milliGRAM(s) IV Intermittent every 12 hours  voriconazole 200 milliGRAM(s) Oral every 12 hours      Heme / Onc:       GI:  pantoprazole    Tablet 40 milliGRAM(s) Oral before breakfast  ursodiol Capsule 300 milliGRAM(s) Oral two times a day      Cardiovascular:   tamsulosin 0.4 milliGRAM(s) Oral at bedtime      Immunologic:   tacrolimus 0.5 milliGRAM(s) Oral daily      Other medications:   finasteride 5 milliGRAM(s) Oral daily  folic acid 1 milliGRAM(s) Oral daily  multivitamin 1 Tablet(s) Oral daily  predniSONE   Tablet 10 milliGRAM(s) Oral daily  sertraline 50 milliGRAM(s) Oral daily  triamcinolone 0.1% Cream 1 Application(s) Topical two times a day  trimethoprim/polymyxin Solution 1 Drop(s) Both EYES two times a day      PRN:   sodium chloride 0.65% Nasal 1 Spray(s) Both Nostrils three times a day PRN    A/P: 60 year old male with a history of AML  Status Post : 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 He is now admitted with shortness of breath, and found to have likely viral pneumonia on CXR. He is COVID negative x 3    1. Infectious Disease:   acyclovir   Oral Tab/Cap 400 milliGRAM(s) Oral two times a day  atovaquone Suspension 750 milliGRAM(s) Oral two times a day  cefepime   IVPB 2000 milliGRAM(s) IV Intermittent every 12 hours  vancomycin  IVPB 750 milliGRAM(s) IV Intermittent every 12 hours  voriconazole 200 milliGRAM(s) Oral every 12 hours    2. GI Prophylaxis:   pantoprazole    Tablet 40 milliGRAM(s) Oral before breakfast    3. Mouthcare ; Skin care     4. GVHD prophylaxis   tacrolimus 0.5 milliGRAM(s) Oral daily    5. Transfuse & replete electrolytes prn     6. IV hydration, daily weights, strict I&O, prn diuresis     7. PO intake as tolerated, nutrition follow up as needed, MVI, folic acid     8. Antiemetics, anti-diarrhea medications:     9. OOB as tolerated, physical therapy consult if needed     10. Monitor coags / fibrinogen 2x week, vitamin K as needed     11. Monitor closely for clinical changes, monitor for fevers     12. Emotional support provided, plan of care discussed and questions addressed     I have written the above note for Dr. Goldberg who performed service with me in the room.   Susan Grey NP-C ([phone])    I have seen and examined patient with NP, I agree with above note as scribed.

## 2020-04-27 NOTE — PROGRESS NOTE ADULT - SUBJECTIVE AND OBJECTIVE BOX
[facility] Division of Hospital Medicine Progress   -deployed to inpatient care due to covid Pandemic   59 yo man admitted w c/o SOB       SUBJECTIVE / OVERNIGHT EVENTS: saw pt this morning , doing much better , appetite better , breathing better , denies any CP, SOB or N/V , cough better , still recquirning oxygen via NRB due to Hypoxia   Additional ROS: neg    MEDICATIONS  (STANDING):  acyclovir   Oral Tab/Cap 400 milliGRAM(s) Oral two times a day  atovaquone Suspension 750 milliGRAM(s) Oral two times a day  cefepime   IVPB 2000 milliGRAM(s) IV Intermittent every 12 hours  finasteride 5 milliGRAM(s) Oral daily  folic acid 1 milliGRAM(s) Oral daily  multivitamin 1 Tablet(s) Oral daily  pantoprazole    Tablet 40 milliGRAM(s) Oral before breakfast  predniSONE   Tablet 10 milliGRAM(s) Oral daily  sertraline 50 milliGRAM(s) Oral daily  tacrolimus 0.5 milliGRAM(s) Oral daily  tamsulosin 0.4 milliGRAM(s) Oral at bedtime  triamcinolone 0.1% Cream 1 Application(s) Topical two times a day  trimethoprim/polymyxin Solution 1 Drop(s) Both EYES two times a day  ursodiol Capsule 300 milliGRAM(s) Oral two times a day  vancomycin  IVPB 750 milliGRAM(s) IV Intermittent every 12 hours  voriconazole 200 milliGRAM(s) Oral every 12 hours    MEDICATIONS  (PRN):  sodium chloride 0.65% Nasal 1 Spray(s) Both Nostrils three times a day PRN Nasal Congestion      CAPILLARY BLOOD GLUCOSE        I&O's Summary    25 Apr 2020 07:01  -  26 Apr 2020 07:00  --------------------------------------------------------  IN: 360 mL / OUT: 300 mL / NET: 60 mL        PHYSICAL EXAM:  Vital Signs Last 24 Hrs  T(C): 36.7 (26 Apr 2020 06:13), Max: 36.7 (26 Apr 2020 06:13)  T(F): 98.1 (26 Apr 2020 06:13), Max: 98.1 (26 Apr 2020 06:13)  HR: 98 (26 Apr 2020 06:13) (98 - 107)  BP: 103/57 (26 Apr 2020 06:13) (103/57 - 119/73)  BP(mean): --  RR: 18 (26 Apr 2020 06:13) (18 - 20)  SpO2: 99% (26 Apr 2020 06:13) (97% - 99%)  CONSTITUTIONAL: NAD, well-developed, well-groomed, resting comfortably in bed on NRB   ENMT: Moist oral mucosa, no pharyngeal injection or exudates; normal dentition  RESPIRATORY: poor respiratory effort; lungs base rales  bilaterally, decrease entry lung bases   CARDIOVASCULAR: Regular rate and rhythm, normal S1 and S2, no murmur/rub/gallop; No lower extremity edema; Peripheral pulses are 2+ bilaterally  ABDOMEN: Nontender to palpation, normoactive bowel sounds, no rebound/guarding; No hepatosplenomegaly  PSYCH: A+O to person, place, and time; affect appropriate  NEUROLOGY: CN 2-12 are intact and symmetric; no gross sensory deficits   SKIN: No rashes; no palpable lesions  ext - trace pedal edema     LABS:                        8.8    8.93  )-----------( 42       ( 26 Apr 2020 06:32 )             28.1     04-25    137  |  100  |  22  ----------------------------<  130<H>  4.7   |  21<L>  |  0.94    Ca    9.1      25 Apr 2020 14:22  Phos  3.8     04-25  Mg     1.6     04-25    TPro  5.9<L>  /  Alb  3.4  /  TBili  0.5  /  DBili  x   /  AST  11  /  ALT  9<L>  /  AlkPhos  79  04-25    Vancomycin Level, Trough (04.26.20 @ 06:32)    Vancomycin Level, Trough: 9.9: Vancomycin trough levels should be rapidly reached and maintained at  15-20 ug/ml for life threatening MRSA  infections such as sepsis, endocarditis, osteomyelitis and pneumonia. A  first trough level should be drawn  before the 3rd or 4th dose.  Risk of renal toxicity is increased for levels >15 ug/ml, in patients on  other nephrotoxic drugs, who are  hemodynamically unstable, have unstable renal function, or are on  Vancomycin therapy for >14 days. Renal function with  creatinine levels should be monitored for those patients. ug/mL    Tacrolimus (), Serum: 9.5: Tacrolimus testing is performed on the [employer]  by  chemiluminescent microparticle immunoassay. The therapeutic range of  tacrolimus is not clearly defined but target 12-hour trough whole blood  concentrations are 5.0 - 20.0 ng/mL early post transplant. Twenty-four  hour  trough concentrations are 33-50% less than the corresponding 12-hour  trough. ng/mL (04.26.20 @ 08:59)          COVID-19 PCR: NotDetec (04-23-20 @ 13:56)  COVID-19 PCR: NotDetec (04-22-20 @ 20:58)  COVID-19 PCR: NotDetec (04-22-20 @ 17:50)      RADIOLOGY & ADDITIONAL TESTS:  Imaging from Last 24 Hours:  Electrocardiogram/QTc Interval:    COORDINATION OF CARE:  Care Discussed with Consultants/Other Providers:

## 2020-04-27 NOTE — DIETITIAN INITIAL EVALUATION ADULT. - ADD RECOMMEND
1) Continue current diet as tolerated. Encourage PO intake of protein-rich foods. 2) Continue Ensure Enlive BID (changed flavor to chocolate per pt preference). RD to remain available and follow-up as medically appropriate. to add Mighty shakes 1x per day 3) Obtain/honor food preferences as able. 4) Provide diet education reinforcement as needed. 5) RD to remain available and follow-up as medically appropriate.

## 2020-04-27 NOTE — DIETITIAN INITIAL EVALUATION ADULT. - PROBLEM SELECTOR PLAN 2
Pt with sepsis likely 2/2 PNA. Tachycardic, +leukocytosis, elevated lactate and AMS, since resolved. Briefly required vasopressor support in ED.   -ID consult in the AM to assist with definitively ruling out COVID or if further testing is needed given significant hypoxia and b/l infiltrates.   - pending Blood cultures x2 and urine culture  - Vanc 1g Q12H, Cefepime 2g Q12H, Azithro  - ID consult in AM given sepsis in SCT patient  - If persistently hypotensive would start Hydrocortisone 100 TID given likely adrenal insufficiency

## 2020-04-27 NOTE — PROGRESS NOTE ADULT - PROBLEM SELECTOR PLAN 1
c/w non rebreather- patient is noted to be worsening on non rebreather   check COVID PCR x 3   nasal pcr for staph aureus/MRSA - negative ; can likely stop Vancomycin, awaiting ID following   Continue cefepime 1q12 for now   Cultures have remained negative

## 2020-04-27 NOTE — DIETITIAN INITIAL EVALUATION ADULT. - PHYSICAL APPEARANCE
overweight/BMI 26.4kg/m2/other (specify) Ht: 73 inches , Wt: 200bs, BMI: 26.4kg/m2, IBW: 184lbs +/- 10%, %IBW: 109%  Edema: none, Skin: free of pressure injuries per nursing flow sheets      Unable to conduct nutrition-focused physical exam due to limited contact restrictions related to patient's medical condition and isolation precaution.

## 2020-04-27 NOTE — PROGRESS NOTE ADULT - SUBJECTIVE AND OBJECTIVE BOX
Follow Up:  sepsis, pneumonia    Interval History: pt afebrile, WBC normalized and feels better    ROS:      All other systems negative    Constitutional: no fever, no chills, improved appetite  Eyes: no vision changes, no eye pain  ENT:  no sore throat, no rhinorrhea  Cardiovascular:  no chest pain, no palpitation  Respiratory:  improved SOB,  cough  GI:  no abd pain, no vomiting, no diarrhea  urinary: no dysuria, no hematuria, no flank pain  musculoskeletal:  no joint pain, no joint swelling  skin:  no rash  neurology:  no headache, no seizure        Allergies  No Known Allergies        ANTIMICROBIALS:  acyclovir   Oral Tab/Cap 400 two times a day  atovaquone Suspension 750 two times a day  cefepime   IVPB 2000 every 12 hours  vancomycin  IVPB 750 every 12 hours  voriconazole 200 every 12 hours      OTHER MEDS:  finasteride 5 milliGRAM(s) Oral daily  folic acid 1 milliGRAM(s) Oral daily  multivitamin 1 Tablet(s) Oral daily  pantoprazole    Tablet 40 milliGRAM(s) Oral before breakfast  predniSONE   Tablet 10 milliGRAM(s) Oral daily  sertraline 50 milliGRAM(s) Oral daily  sodium chloride 0.65% Nasal 1 Spray(s) Both Nostrils three times a day PRN  tacrolimus 0.5 milliGRAM(s) Oral daily  tamsulosin 0.4 milliGRAM(s) Oral at bedtime  triamcinolone 0.1% Cream 1 Application(s) Topical two times a day  trimethoprim/polymyxin Solution 1 Drop(s) Both EYES two times a day  ursodiol Capsule 300 milliGRAM(s) Oral two times a day      Vital Signs Last 24 Hrs  T(C): 36.7 (27 Apr 2020 12:31), Max: 37.6 (27 Apr 2020 04:32)  T(F): 98 (27 Apr 2020 12:31), Max: 99.6 (27 Apr 2020 04:32)  HR: 93 (27 Apr 2020 12:31) (86 - 108)  BP: 110/70 (27 Apr 2020 12:31) (100/57 - 130/84)  BP(mean): --  RR: 20 (27 Apr 2020 12:31) (20 - 22)  SpO2: 90% (27 Apr 2020 14:27) (90% - 98%)    Physical Exam:  General:   non toxic  Head: atraumatic, normocephalic  Eye: normal sclera and conjunctiva  ENT:    no oropharyngeal lesions  Cardio:     regular S1, S2  Respiratory:  comfortable on NRB, not tachypneic   abd:     soft,   BS +,   no tenderness  :   no CVAT,  no suprapubic tenderness,   no  saucedo  Musculoskeletal:   no joint swelling,   no edema  vascular: no phlebitis, normal pulses  Skin:    no rash  Neurologic:     no focal deficit  psych: normal affect                            8.5    8.48  )-----------( 43       ( 27 Apr 2020 04:59 )             27.1       04-27    138  |  101  |  24<H>  ----------------------------<  124<H>  4.9   |  24  |  0.98    Ca    8.9      27 Apr 2020 04:59  Phos  2.7     04-27  Mg     1.6     04-27    TPro  5.5<L>  /  Alb  3.2<L>  /  TBili  0.4  /  DBili  x   /  AST  13  /  ALT  8<L>  /  AlkPhos  72  04-27          MICROBIOLOGY:  v  .Blood Blood-Venous  04-23-20   No growth to date.  --  --      .Urine Clean Catch (Midstream)  04-22-20   No growth  --  --                RADIOLOGY:  Images below independently visualized and reviewed personally, findings as below  < from: Xray Chest 1 View- PORTABLE-Urgent (04.22.20 @ 17:56) >  IMPRESSION:   Bilateral nonspecific hazy opacities, right greater than left. Differential includes viral pneumonia secondary to COVID-19.

## 2020-04-28 LAB
ALBUMIN SERPL ELPH-MCNC: 3.3 G/DL — SIGNIFICANT CHANGE UP (ref 3.3–5)
ALP SERPL-CCNC: 67 U/L — SIGNIFICANT CHANGE UP (ref 40–120)
ALT FLD-CCNC: 9 U/L — LOW (ref 10–45)
ANION GAP SERPL CALC-SCNC: 11 MMOL/L — SIGNIFICANT CHANGE UP (ref 5–17)
APTT BLD: 25.9 SEC — LOW (ref 27.5–36.3)
AST SERPL-CCNC: 14 U/L — SIGNIFICANT CHANGE UP (ref 10–40)
BILIRUB SERPL-MCNC: 0.4 MG/DL — SIGNIFICANT CHANGE UP (ref 0.2–1.2)
BUN SERPL-MCNC: 21 MG/DL — SIGNIFICANT CHANGE UP (ref 7–23)
CALCIUM SERPL-MCNC: 9 MG/DL — SIGNIFICANT CHANGE UP (ref 8.4–10.5)
CHLORIDE SERPL-SCNC: 100 MMOL/L — SIGNIFICANT CHANGE UP (ref 96–108)
CO2 SERPL-SCNC: 26 MMOL/L — SIGNIFICANT CHANGE UP (ref 22–31)
CREAT SERPL-MCNC: 0.92 MG/DL — SIGNIFICANT CHANGE UP (ref 0.5–1.3)
CULTURE RESULTS: SIGNIFICANT CHANGE UP
CULTURE RESULTS: SIGNIFICANT CHANGE UP
GLUCOSE SERPL-MCNC: 155 MG/DL — HIGH (ref 70–99)
HCT VFR BLD CALC: 27.5 % — LOW (ref 39–50)
HGB BLD-MCNC: 8.6 G/DL — LOW (ref 13–17)
INR BLD: 1.37 RATIO — HIGH (ref 0.88–1.16)
MAGNESIUM SERPL-MCNC: 1.7 MG/DL — SIGNIFICANT CHANGE UP (ref 1.6–2.6)
MCHC RBC-ENTMCNC: 31.3 GM/DL — LOW (ref 32–36)
MCHC RBC-ENTMCNC: 36.8 PG — HIGH (ref 27–34)
MCV RBC AUTO: 117.5 FL — HIGH (ref 80–100)
NRBC # BLD: 0 /100 WBCS — SIGNIFICANT CHANGE UP (ref 0–0)
PHOSPHATE SERPL-MCNC: 2.9 MG/DL — SIGNIFICANT CHANGE UP (ref 2.5–4.5)
PLATELET # BLD AUTO: 52 K/UL — LOW (ref 150–400)
POTASSIUM SERPL-MCNC: 4.7 MMOL/L — SIGNIFICANT CHANGE UP (ref 3.5–5.3)
POTASSIUM SERPL-SCNC: 4.7 MMOL/L — SIGNIFICANT CHANGE UP (ref 3.5–5.3)
PROT SERPL-MCNC: 5.9 G/DL — LOW (ref 6–8.3)
PROTHROM AB SERPL-ACNC: 15.9 SEC — HIGH (ref 10–12.9)
RBC # BLD: 2.34 M/UL — LOW (ref 4.2–5.8)
RBC # FLD: 16.1 % — HIGH (ref 10.3–14.5)
SARS-COV-2 RNA SPEC QL NAA+PROBE: SIGNIFICANT CHANGE UP
SODIUM SERPL-SCNC: 137 MMOL/L — SIGNIFICANT CHANGE UP (ref 135–145)
SPECIMEN SOURCE: SIGNIFICANT CHANGE UP
SPECIMEN SOURCE: SIGNIFICANT CHANGE UP
TACROLIMUS SERPL-MCNC: 6.5 NG/ML — SIGNIFICANT CHANGE UP
VANCOMYCIN TROUGH SERPL-MCNC: 11.9 UG/ML — SIGNIFICANT CHANGE UP (ref 10–20)
WBC # BLD: 7.97 K/UL — SIGNIFICANT CHANGE UP (ref 3.8–10.5)
WBC # FLD AUTO: 7.97 K/UL — SIGNIFICANT CHANGE UP (ref 3.8–10.5)

## 2020-04-28 PROCEDURE — 99232 SBSQ HOSP IP/OBS MODERATE 35: CPT

## 2020-04-28 PROCEDURE — 93010 ELECTROCARDIOGRAM REPORT: CPT

## 2020-04-28 PROCEDURE — 99231 SBSQ HOSP IP/OBS SF/LOW 25: CPT

## 2020-04-28 RX ORDER — MAGNESIUM SULFATE 500 MG/ML
1 VIAL (ML) INJECTION ONCE
Refills: 0 | Status: COMPLETED | OUTPATIENT
Start: 2020-04-28 | End: 2020-04-28

## 2020-04-28 RX ORDER — ALPRAZOLAM 0.25 MG
0.25 TABLET ORAL AT BEDTIME
Refills: 0 | Status: DISCONTINUED | OUTPATIENT
Start: 2020-04-28 | End: 2020-04-29

## 2020-04-28 RX ADMIN — VORICONAZOLE 200 MILLIGRAM(S): 10 INJECTION, POWDER, LYOPHILIZED, FOR SOLUTION INTRAVENOUS at 05:43

## 2020-04-28 RX ADMIN — Medication 400 MILLIGRAM(S): at 17:57

## 2020-04-28 RX ADMIN — ATOVAQUONE 750 MILLIGRAM(S): 750 SUSPENSION ORAL at 17:56

## 2020-04-28 RX ADMIN — Medication 1 DROP(S): at 05:41

## 2020-04-28 RX ADMIN — Medication 10 MILLIGRAM(S): at 05:40

## 2020-04-28 RX ADMIN — FINASTERIDE 5 MILLIGRAM(S): 5 TABLET, FILM COATED ORAL at 12:19

## 2020-04-28 RX ADMIN — Medication 400 MILLIGRAM(S): at 05:40

## 2020-04-28 RX ADMIN — Medication 100 GRAM(S): at 12:18

## 2020-04-28 RX ADMIN — ATOVAQUONE 750 MILLIGRAM(S): 750 SUSPENSION ORAL at 05:40

## 2020-04-28 RX ADMIN — PANTOPRAZOLE SODIUM 40 MILLIGRAM(S): 20 TABLET, DELAYED RELEASE ORAL at 05:40

## 2020-04-28 RX ADMIN — CEFEPIME 100 MILLIGRAM(S): 1 INJECTION, POWDER, FOR SOLUTION INTRAMUSCULAR; INTRAVENOUS at 05:40

## 2020-04-28 RX ADMIN — SERTRALINE 50 MILLIGRAM(S): 25 TABLET, FILM COATED ORAL at 12:19

## 2020-04-28 RX ADMIN — URSODIOL 300 MILLIGRAM(S): 250 TABLET, FILM COATED ORAL at 17:57

## 2020-04-28 RX ADMIN — Medication 0.25 MILLIGRAM(S): at 21:12

## 2020-04-28 RX ADMIN — Medication 1 APPLICATION(S): at 05:41

## 2020-04-28 RX ADMIN — Medication 1 MILLIGRAM(S): at 12:19

## 2020-04-28 RX ADMIN — TACROLIMUS 0.5 MILLIGRAM(S): 5 CAPSULE ORAL at 12:18

## 2020-04-28 RX ADMIN — Medication 1 TABLET(S): at 12:19

## 2020-04-28 RX ADMIN — URSODIOL 300 MILLIGRAM(S): 250 TABLET, FILM COATED ORAL at 05:43

## 2020-04-28 RX ADMIN — VORICONAZOLE 200 MILLIGRAM(S): 10 INJECTION, POWDER, LYOPHILIZED, FOR SOLUTION INTRAVENOUS at 17:57

## 2020-04-28 RX ADMIN — TAMSULOSIN HYDROCHLORIDE 0.4 MILLIGRAM(S): 0.4 CAPSULE ORAL at 21:12

## 2020-04-28 RX ADMIN — Medication 250 MILLIGRAM(S): at 07:02

## 2020-04-28 NOTE — PHYSICAL THERAPY INITIAL EVALUATION ADULT - ADDITIONAL COMMENTS
Pt reports living in private condo with partner; no steps to enter w/ 1 flight to second floor bedroom. Pt owns RW and shower chair but did not use prior to admission. Pt reports independence w/ ambulation and ADLs.

## 2020-04-28 NOTE — PHYSICAL THERAPY INITIAL EVALUATION ADULT - PLANNED THERAPY INTERVENTIONS, PT EVAL
gait training/Stairs: Pt will negotiation 1 flight of stairs without handrail and reciprocal gait pattern within 4 weeks.

## 2020-04-28 NOTE — PROGRESS NOTE ADULT - SUBJECTIVE AND OBJECTIVE BOX
[facility]PC Transplant Team                                                      Critical / Counseling Time Provided: 30 minutes                                                                                                                                                        Chief Complaint: Shortness of breath     S: Patient seen and examined with \[facility]\"" Transplant Team:   + fatigue   + dyspnea   + occasional cough       O: Vitals:   Vital Signs Last 24 Hrs  T(C): 36.7 (2020 05:06), Max: 36.8 (2020 20:30)  T(F): 98.1 (2020 05:06), Max: 98.3 (2020 21:00)  HR: 97 (2020 05:06) (93 - 105)  BP: 99/60 (2020 05:06) (99/60 - 122/75)  BP(mean): --  RR: 20 (2020 05:06) (20 - 20)  SpO2: 99% (2020 05:06) (90% - 99%)    Admit weight:  90.7 kg   Daily Weight in k.7 (2020 11:32)    Intake / Output:    @ 07:01  -   @ 07:00  --------------------------------------------------------  IN: 420 mL / OUT: 1500 mL / NET: -1080 mL      PE:   Constitutional: NAD  Respiratory: CTA throughout bilaterally respiratory effort; lungs are clear to auscultation bilaterally  CV: S1, S2 RRR   Abdomen:+ BS x 4, soft, NT, ND   Extremities: no edema   Neuro: A&O x 3   Skin: No rashes; no palpable lesions    Labs:   CBC Full  -  ( 2020 08:51 )  WBC Count : 7.97 K/uL  Hemoglobin : 8.6 g/dL  Hematocrit : 27.5 %  Platelet Count - Automated : 52 K/uL  Mean Cell Volume : 117.5 fl  Mean Cell Hemoglobin : 36.8 pg  Mean Cell Hemoglobin Concentration : 31.3 gm/dL  Auto Neutrophil # : x  Auto Lymphocyte # : x  Auto Monocyte # : x  Auto Eosinophil # : x  Auto Basophil # : x  Auto Neutrophil % : x  Auto Lymphocyte % : x  Auto Monocyte % : x  Auto Eosinophil % : x  Auto Basophil % : x                          8.6    7.97  )-----------( 52       ( 2020 08:51 )             27.5     -    137  |  100  |  21  ----------------------------<  155<H>  4.7   |  26  |  0.92    Ca    9.0      2020 08:51  Phos  2.9       Mg     1.7         TPro  5.9<L>  /  Alb  3.3  /  TBili  0.4  /  DBili  x   /  AST  14  /  ALT  9<L>  /  AlkPhos  67  -28    PT/INR - ( 2020 04:59 )   PT: 17.3 sec;   INR: 1.49 ratio         PTT - ( 2020 04:59 )  PTT:27.4 sec  LIVER FUNCTIONS - ( 2020 08:51 )  Alb: 3.3 g/dL / Pro: 5.9 g/dL / ALK PHOS: 67 U/L / ALT: 9 U/L / AST: 14 U/L / GGT: x           Cultures:   Culture Results: blood  No Growth Final (20 @ 00:48)    Culture Results: blood  No Growth Final (20 @ 00:48)    Culture Results: urine  No growth (20 @ 22:19)    Radiology:   EXAM:  XR CHEST PORTABLE URGENT 1V                        PROCEDURE DATE:  2020    IMPRESSION:   Bilateral nonspecific hazy opacities, right greater than left. Differential includes viral pneumonia secondary to COVID-19.    Meds:   Antimicrobials:   acyclovir   Oral Tab/Cap 400 milliGRAM(s) Oral two times a day  atovaquone Suspension 750 milliGRAM(s) Oral two times a day  cefepime   IVPB 2000 milliGRAM(s) IV Intermittent every 12 hours  vancomycin  IVPB 750 milliGRAM(s) IV Intermittent every 12 hours  voriconazole 200 milliGRAM(s) Oral every 12 hours      Heme / Onc:       GI:  pantoprazole    Tablet 40 milliGRAM(s) Oral before breakfast  ursodiol Capsule 300 milliGRAM(s) Oral two times a day      Cardiovascular:   tamsulosin 0.4 milliGRAM(s) Oral at bedtime      Immunologic:   tacrolimus 0.5 milliGRAM(s) Oral daily      Other medications:   finasteride 5 milliGRAM(s) Oral daily  folic acid 1 milliGRAM(s) Oral daily  multivitamin 1 Tablet(s) Oral daily  predniSONE   Tablet 10 milliGRAM(s) Oral daily  sertraline 50 milliGRAM(s) Oral daily  triamcinolone 0.1% Cream 1 Application(s) Topical two times a day  trimethoprim/polymyxin Solution 1 Drop(s) Both EYES two times a day      PRN:   sodium chloride 0.65% Nasal 1 Spray(s) Both Nostrils three times a day PRN    A/P: 60 year old male with a history of AML  Status Post : 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 He is now admitted with shortness of breath, and found to have likely viral pneumonia on CXR. He is COVID negative x 3    1. Infectious Disease:   acyclovir   Oral Tab/Cap 400 milliGRAM(s) Oral two times a day  atovaquone Suspension 750 milliGRAM(s) Oral two times a day  cefepime   IVPB 2000 milliGRAM(s) IV Intermittent every 12 hours  vancomycin  IVPB 750 milliGRAM(s) IV Intermittent every 12 hours  voriconazole 200 milliGRAM(s) Oral every 12 hours    2. GI Prophylaxis:   pantoprazole    Tablet 40 milliGRAM(s) Oral before breakfast    3. Mouthcare; Skin care     4. GVHD prophylaxis   tacrolimus 0.5 milliGRAM(s) Oral daily    5. Transfuse & replete electrolytes prn     6. IV hydration, daily weights, strict I&O, prn diuresis     7. PO intake as tolerated, nutrition follow up as needed, MVI, folic acid     8. Antiemetics, anti-diarrhea medications:     9. OOB as tolerated, physical therapy consult if needed     10. Monitor coags / fibrinogen 2x week, vitamin K as needed     11. Monitor closely for clinical changes, monitor for fevers     12. Emotional support provided, plan of care discussed and questions addressed       I have written the above note for Dr. Goldberg who performed service with me in the room.   Susan Grey NP-C ([phone])    I have seen and examined patient with NP, I agree with above note as scribed.

## 2020-04-28 NOTE — PROGRESS NOTE ADULT - SUBJECTIVE AND OBJECTIVE BOX
[facility] Division of Hospital Medicine Progress   -deployed to inpatient care due to covid Pandemic   59 yo man admitted w c/o SOB       SUBJECTIVE / OVERNIGHT EVENTS: saw pt this morning , doing much better , appetite better , breathing better, now on nasal cannula and good O2 sat.  Additional ROS: neg    MEDICATIONS  (STANDING):  acyclovir   Oral Tab/Cap 400 milliGRAM(s) Oral two times a day  atovaquone Suspension 750 milliGRAM(s) Oral two times a day  cefepime   IVPB 2000 milliGRAM(s) IV Intermittent every 12 hours  finasteride 5 milliGRAM(s) Oral daily  folic acid 1 milliGRAM(s) Oral daily  multivitamin 1 Tablet(s) Oral daily  pantoprazole    Tablet 40 milliGRAM(s) Oral before breakfast  predniSONE   Tablet 10 milliGRAM(s) Oral daily  sertraline 50 milliGRAM(s) Oral daily  tacrolimus 0.5 milliGRAM(s) Oral daily  tamsulosin 0.4 milliGRAM(s) Oral at bedtime  triamcinolone 0.1% Cream 1 Application(s) Topical two times a day  trimethoprim/polymyxin Solution 1 Drop(s) Both EYES two times a day  ursodiol Capsule 300 milliGRAM(s) Oral two times a day  vancomycin  IVPB 750 milliGRAM(s) IV Intermittent every 12 hours  voriconazole 200 milliGRAM(s) Oral every 12 hours    MEDICATIONS  (PRN):  sodium chloride 0.65% Nasal 1 Spray(s) Both Nostrils three times a day PRN Nasal Congestion      CAPILLARY BLOOD GLUCOSE        I&O's Summary    25 Apr 2020 07:01  -  26 Apr 2020 07:00  --------------------------------------------------------  IN: 360 mL / OUT: 300 mL / NET: 60 mL        PHYSICAL EXAM:  Vital Signs Last 24 Hrs  T(C): 36.7 (26 Apr 2020 06:13), Max: 36.7 (26 Apr 2020 06:13)  T(F): 98.1 (26 Apr 2020 06:13), Max: 98.1 (26 Apr 2020 06:13)  HR: 98 (26 Apr 2020 06:13) (98 - 107)  BP: 103/57 (26 Apr 2020 06:13) (103/57 - 119/73)  BP(mean): --  RR: 18 (26 Apr 2020 06:13) (18 - 20)  SpO2: 99% (26 Apr 2020 06:13) (97% - 99%)  CONSTITUTIONAL: NAD, well-developed, well-groomed, resting comfortably in bed on NRB   ENMT: Moist oral mucosa, no pharyngeal injection or exudates; normal dentition  RESPIRATORY: poor respiratory effort; lungs base rales  bilaterally, decrease entry lung bases   CARDIOVASCULAR: Regular rate and rhythm, normal S1 and S2, no murmur/rub/gallop; No lower extremity edema; Peripheral pulses are 2+ bilaterally  ABDOMEN: Nontender to palpation, normoactive bowel sounds, no rebound/guarding; No hepatosplenomegaly  PSYCH: A+O to person, place, and time; affect appropriate  NEUROLOGY: CN 2-12 are intact and symmetric; no gross sensory deficits   SKIN: No rashes; no palpable lesions  ext - trace pedal edema     LABS:                        8.8    8.93  )-----------( 42       ( 26 Apr 2020 06:32 )             28.1     04-25    137  |  100  |  22  ----------------------------<  130<H>  4.7   |  21<L>  |  0.94    Ca    9.1      25 Apr 2020 14:22  Phos  3.8     04-25  Mg     1.6     04-25    TPro  5.9<L>  /  Alb  3.4  /  TBili  0.5  /  DBili  x   /  AST  11  /  ALT  9<L>  /  AlkPhos  79  04-25    Vancomycin Level, Trough (04.26.20 @ 06:32)    Vancomycin Level, Trough: 9.9: Vancomycin trough levels should be rapidly reached and maintained at  15-20 ug/ml for life threatening MRSA  infections such as sepsis, endocarditis, osteomyelitis and pneumonia. A  first trough level should be drawn  before the 3rd or 4th dose.  Risk of renal toxicity is increased for levels >15 ug/ml, in patients on  other nephrotoxic drugs, who are  hemodynamically unstable, have unstable renal function, or are on  Vancomycin therapy for >14 days. Renal function with  creatinine levels should be monitored for those patients. ug/mL    Tacrolimus (), Serum: 9.5: Tacrolimus testing is performed on the [employer]  by  chemiluminescent microparticle immunoassay. The therapeutic range of  tacrolimus is not clearly defined but target 12-hour trough whole blood  concentrations are 5.0 - 20.0 ng/mL early post transplant. Twenty-four  hour  trough concentrations are 33-50% less than the corresponding 12-hour  trough. ng/mL (04.26.20 @ 08:59)          COVID-19 PCR: NotDetec (04-23-20 @ 13:56)  COVID-19 PCR: NotDetec (04-22-20 @ 20:58)  COVID-19 PCR: NotDetec (04-22-20 @ 17:50)      RADIOLOGY & ADDITIONAL TESTS:  Imaging from Last 24 Hours:  Electrocardiogram/QTc Interval:    COORDINATION OF CARE:  Care Discussed with Consultants/Other Providers:

## 2020-04-28 NOTE — PROGRESS NOTE ADULT - ASSESSMENT
· Assessment		  60 m with HTN, AML s/p bone marrow transplant 10/2019, DVT/PE (not on AC due to thrombocytopenia),  factor V Leiden and HFrEF, BIBEMS for dyspnea was hypotensive and required levophed by EMS but responded to fluids  today afebrile ,spo2 98% on 10L NRB  leukocytosis  improved  after antibiotics  COVID negative x 3  CXR: b/l opacities, R>L  Bld and Ucxs neg    # hypoxic respiratory failure and b/l pneumonia but COVID negative x 3 ?false neg and/or  ?bacterial pneumonia in the setting on AML s/o BMT 10/2019, WBC improved to normal on antibiotics  urine legionella negative, MRSA swab negative, blood cx negative  -ID and hematology consult appreciated   -check sputum cx- pending collection   -QTc prolong and pt also on voriconazole, Plaquenil discontinued   -c/w vanco 1 q 12 and cefepime 2 q 12: d/c after today's doses  - f/u vanco trough-elevated on 4/25 vanco held, 4/26 9.9 restarted vanco at 750   -c/w mepron, voriconazole and acyclovir ppx  -still hypoxic requiring O2 supplement -continue monitor the O2sat goal 92-96 %--today spo2 97 to 99 on NRB , titrate oxygen a tolerated down   -- advised out of bed to chair   -- pt evaluation   -needs daily CBC with diff and Tacrolimus levels, CMp    #loss of appetite- improving -encouraged ensure - pt  requested Chocolate flavor     DVT prophylaxis - not on anticoagulation due to thrombocytopenia- advised leg and foot movements OOB to chair encouraged     Spoke with Pt Emergency contact -Roommate Roger (nurse)  on phone update pt condition 4/25th , called left message x 2 on 4/26th , spoke with Roger updated pt condition afternoon 1 pm

## 2020-04-28 NOTE — PROGRESS NOTE ADULT - ASSESSMENT
60 m with HTN, AML s/p bone marrow transplant 10/2019, DVT/PE (not on AC due to thrombocytopenia),  factor V Leiden and HFrEF, BIBEMS for dyspnea was hypotensive and required levophed by EMS but responded to fluids  here febrile to 100.1, BP in 80s-90s, tachycardic and tachypneic on NRB  leukocytosis 19 improved to 13 after antibiotics  COVID negative x 2  CXR: b/l opacities, R>L    fever, tachycardia, hypotension, leukocytosis, septic shock  hypoxic respiratory failure and b/l pneumonia but COVID negative x 3 ?bacterial pneumonia in the setting on AML s/o BMT 10/2019, WBC improved on antibiotics  urine legionella negative, MRSA swab negative, blood cx negative    * pt clinically better and WBC normalized on vanco and cefepime  * QTc prolong and pt also on voriconazole, so did not receive plaquenil  * s/p 7 days of  vanco and cefepime, started 4/22, DC after the today's dose  * c/w mepron, voriconazole and acyclovir ppx  * monitor the O2sat and taper O2 as tolerated    The above assessment and plan was discussed with natanael Faith MD  Pager [phone]  After 5pm and on weekends call [phone] .

## 2020-04-28 NOTE — PROGRESS NOTE ADULT - ATTENDING COMMENTS
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 Airborne isolation  Tacro level= 8.9 on 4/24; BC(4/23)- negative to date  Plan-  Patient immunosuppressed s/p allogeneic PSCT, on Tacrolimus. Agree with treatment for possible bacterial pneumonia with Cefepime/Vanco IV.   Continue Acyclovir, Mepron, Vfend  Continue Prednisone 10 mg po daily.   Continue Tacrolimus, check level daily, stable level 8.9 on 4/25  Continue Kyle s/p transplant  Continue Folate, MVI, PPI  Continue O2 supplement, monitor O2 sats

## 2020-04-28 NOTE — PROGRESS NOTE ADULT - SUBJECTIVE AND OBJECTIVE BOX
Follow Up:  sepsis, pneumonia    Interval History: pt afebrile, WBC normalized and feels better    ROS:      All other systems negative    Constitutional: no fever, no chills, improved appetite  Eyes: no vision changes, no eye pain  ENT:  no sore throat, no rhinorrhea  Cardiovascular:  no chest pain, no palpitation  Respiratory:  improved SOB, no cough  GI:  no abd pain, no vomiting, no diarrhea  urinary: no dysuria, no hematuria, no flank pain  musculoskeletal:  no joint pain, no joint swelling  skin:  no rash  neurology:  no headache, no seizure          Allergies  No Known Allergies        ANTIMICROBIALS:  acyclovir   Oral Tab/Cap 400 two times a day  atovaquone Suspension 750 two times a day  cefepime   IVPB 2000 every 12 hours  vancomycin  IVPB 750 every 12 hours  voriconazole 200 every 12 hours      OTHER MEDS:  finasteride 5 milliGRAM(s) Oral daily  folic acid 1 milliGRAM(s) Oral daily  multivitamin 1 Tablet(s) Oral daily  pantoprazole    Tablet 40 milliGRAM(s) Oral before breakfast  predniSONE   Tablet 10 milliGRAM(s) Oral daily  sertraline 50 milliGRAM(s) Oral daily  sodium chloride 0.65% Nasal 1 Spray(s) Both Nostrils three times a day PRN  tacrolimus 0.5 milliGRAM(s) Oral daily  tamsulosin 0.4 milliGRAM(s) Oral at bedtime  triamcinolone 0.1% Cream 1 Application(s) Topical two times a day  trimethoprim/polymyxin Solution 1 Drop(s) Both EYES two times a day  ursodiol Capsule 300 milliGRAM(s) Oral two times a day      Vital Signs Last 24 Hrs  T(C): 36.7 (28 Apr 2020 05:06), Max: 36.8 (27 Apr 2020 20:30)  T(F): 98.1 (28 Apr 2020 05:06), Max: 98.3 (27 Apr 2020 21:00)  HR: 98 (28 Apr 2020 10:25) (97 - 105)  BP: 129/80 (28 Apr 2020 10:25) (99/60 - 129/80)  BP(mean): --  RR: 20 (28 Apr 2020 05:06) (20 - 20)  SpO2: 95% (28 Apr 2020 10:25) (90% - 99%)    Physical Exam:  General:   non toxic  Head: atraumatic, normocephalic  Eye: normal sclera and conjunctiva  ENT:    no oropharyngeal lesions  Cardio:     regular S1, S2  Respiratory:  comfortable on NRB, not tachypneic   abd:     soft,   BS +,   no tenderness  :   no CVAT,  no suprapubic tenderness,   no  saucedo  Musculoskeletal:   no joint swelling,   no edema  vascular: no phlebitis, normal pulses  Skin:    no rash  Neurologic:     no focal deficit  psych: normal affect                          8.6    7.97  )-----------( 52       ( 28 Apr 2020 08:51 )             27.5       04-28    137  |  100  |  21  ----------------------------<  155<H>  4.7   |  26  |  0.92    Ca    9.0      28 Apr 2020 08:51  Phos  2.9     04-28  Mg     1.7     04-28    TPro  5.9<L>  /  Alb  3.3  /  TBili  0.4  /  DBili  x   /  AST  14  /  ALT  9<L>  /  AlkPhos  67  04-28          MICROBIOLOGY:  Vancomycin Level, Trough: 11.9 ug/mL (04-28-20 @ 05:33)  Vancomycin Level, Trough: 12.2 ug/mL (04-27-20 @ 16:52)  v  .Blood Blood-Venous  04-23-20   No Growth Final  --  --      .Urine Clean Catch (Midstream)  04-22-20   No growth  --  --          CMVPCR Log: NotDetec Lub80LI/mL (04-27 @ 08:11)        RADIOLOGY:  Images below independently visualized and reviewed personally, findings as below  < from: Xray Chest 1 View- PORTABLE-Urgent (04.22.20 @ 17:56) >    IMPRESSION:   Bilateral nonspecific hazy opacities, right greater than left. Differential includes viral pneumonia secondary to COVID-19.

## 2020-04-28 NOTE — PHYSICAL THERAPY INITIAL EVALUATION ADULT - PERTINENT HX OF CURRENT PROBLEM, REHAB EVAL
Mr. 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 Pt states he has had fatigue, SOB and chills for ~5days. Covid test (-).

## 2020-04-29 ENCOUNTER — TRANSCRIPTION ENCOUNTER (OUTPATIENT)
Age: 60
End: 2020-04-29

## 2020-04-29 VITALS
OXYGEN SATURATION: 93 % | DIASTOLIC BLOOD PRESSURE: 95 MMHG | RESPIRATION RATE: 18 BRPM | SYSTOLIC BLOOD PRESSURE: 141 MMHG | TEMPERATURE: 98 F | HEART RATE: 98 BPM

## 2020-04-29 LAB
ALBUMIN SERPL ELPH-MCNC: 3.3 G/DL — SIGNIFICANT CHANGE UP (ref 3.3–5)
ALP SERPL-CCNC: 70 U/L — SIGNIFICANT CHANGE UP (ref 40–120)
ALT FLD-CCNC: 8 U/L — LOW (ref 10–45)
ANION GAP SERPL CALC-SCNC: 10 MMOL/L — SIGNIFICANT CHANGE UP (ref 5–17)
APTT BLD: 25.7 SEC — LOW (ref 27.5–36.3)
AST SERPL-CCNC: 12 U/L — SIGNIFICANT CHANGE UP (ref 10–40)
BASOPHILS # BLD AUTO: 0.02 K/UL — SIGNIFICANT CHANGE UP (ref 0–0.2)
BASOPHILS NFR BLD AUTO: 0.3 % — SIGNIFICANT CHANGE UP (ref 0–2)
BILIRUB SERPL-MCNC: 0.3 MG/DL — SIGNIFICANT CHANGE UP (ref 0.2–1.2)
BUN SERPL-MCNC: 20 MG/DL — SIGNIFICANT CHANGE UP (ref 7–23)
CALCIUM SERPL-MCNC: 8.8 MG/DL — SIGNIFICANT CHANGE UP (ref 8.4–10.5)
CHLORIDE SERPL-SCNC: 100 MMOL/L — SIGNIFICANT CHANGE UP (ref 96–108)
CO2 SERPL-SCNC: 25 MMOL/L — SIGNIFICANT CHANGE UP (ref 22–31)
CREAT SERPL-MCNC: 0.9 MG/DL — SIGNIFICANT CHANGE UP (ref 0.5–1.3)
EOSINOPHIL # BLD AUTO: 0.2 K/UL — SIGNIFICANT CHANGE UP (ref 0–0.5)
EOSINOPHIL NFR BLD AUTO: 3.1 % — SIGNIFICANT CHANGE UP (ref 0–6)
GLUCOSE SERPL-MCNC: 113 MG/DL — HIGH (ref 70–99)
HCT VFR BLD CALC: 26.5 % — LOW (ref 39–50)
HGB BLD-MCNC: 8.5 G/DL — LOW (ref 13–17)
IMM GRANULOCYTES NFR BLD AUTO: 0.8 % — SIGNIFICANT CHANGE UP (ref 0–1.5)
INR BLD: 1.4 RATIO — HIGH (ref 0.88–1.16)
LYMPHOCYTES # BLD AUTO: 2.66 K/UL — SIGNIFICANT CHANGE UP (ref 1–3.3)
LYMPHOCYTES # BLD AUTO: 41.4 % — SIGNIFICANT CHANGE UP (ref 13–44)
MAGNESIUM SERPL-MCNC: 1.8 MG/DL — SIGNIFICANT CHANGE UP (ref 1.6–2.6)
MCHC RBC-ENTMCNC: 32.1 GM/DL — SIGNIFICANT CHANGE UP (ref 32–36)
MCHC RBC-ENTMCNC: 37 PG — HIGH (ref 27–34)
MCV RBC AUTO: 115.2 FL — HIGH (ref 80–100)
MONOCYTES # BLD AUTO: 0.43 K/UL — SIGNIFICANT CHANGE UP (ref 0–0.9)
MONOCYTES NFR BLD AUTO: 6.7 % — SIGNIFICANT CHANGE UP (ref 2–14)
NEUTROPHILS # BLD AUTO: 3.06 K/UL — SIGNIFICANT CHANGE UP (ref 1.8–7.4)
NEUTROPHILS NFR BLD AUTO: 47.7 % — SIGNIFICANT CHANGE UP (ref 43–77)
NRBC # BLD: 0 /100 WBCS — SIGNIFICANT CHANGE UP (ref 0–0)
PHOSPHATE SERPL-MCNC: 2.9 MG/DL — SIGNIFICANT CHANGE UP (ref 2.5–4.5)
PLATELET # BLD AUTO: 51 K/UL — LOW (ref 150–400)
POTASSIUM SERPL-MCNC: 4.5 MMOL/L — SIGNIFICANT CHANGE UP (ref 3.5–5.3)
POTASSIUM SERPL-SCNC: 4.5 MMOL/L — SIGNIFICANT CHANGE UP (ref 3.5–5.3)
PROT SERPL-MCNC: 5.5 G/DL — LOW (ref 6–8.3)
PROTHROM AB SERPL-ACNC: 16.1 SEC — HIGH (ref 10–13.1)
RBC # BLD: 2.3 M/UL — LOW (ref 4.2–5.8)
RBC # FLD: 15.9 % — HIGH (ref 10.3–14.5)
SODIUM SERPL-SCNC: 135 MMOL/L — SIGNIFICANT CHANGE UP (ref 135–145)
TACROLIMUS SERPL-MCNC: 6.4 NG/ML — SIGNIFICANT CHANGE UP
WBC # BLD: 6.42 K/UL — SIGNIFICANT CHANGE UP (ref 3.8–10.5)
WBC # FLD AUTO: 6.42 K/UL — SIGNIFICANT CHANGE UP (ref 3.8–10.5)

## 2020-04-29 PROCEDURE — 99291 CRITICAL CARE FIRST HOUR: CPT | Mod: 25

## 2020-04-29 PROCEDURE — 93005 ELECTROCARDIOGRAM TRACING: CPT

## 2020-04-29 PROCEDURE — 85014 HEMATOCRIT: CPT

## 2020-04-29 PROCEDURE — 87641 MR-STAPH DNA AMP PROBE: CPT

## 2020-04-29 PROCEDURE — 99231 SBSQ HOSP IP/OBS SF/LOW 25: CPT

## 2020-04-29 PROCEDURE — 85610 PROTHROMBIN TIME: CPT

## 2020-04-29 PROCEDURE — 85027 COMPLETE CBC AUTOMATED: CPT

## 2020-04-29 PROCEDURE — 83605 ASSAY OF LACTIC ACID: CPT

## 2020-04-29 PROCEDURE — 85730 THROMBOPLASTIN TIME PARTIAL: CPT

## 2020-04-29 PROCEDURE — 71045 X-RAY EXAM CHEST 1 VIEW: CPT

## 2020-04-29 PROCEDURE — 83735 ASSAY OF MAGNESIUM: CPT

## 2020-04-29 PROCEDURE — 82803 BLOOD GASES ANY COMBINATION: CPT

## 2020-04-29 PROCEDURE — 80048 BASIC METABOLIC PNL TOTAL CA: CPT

## 2020-04-29 PROCEDURE — 99232 SBSQ HOSP IP/OBS MODERATE 35: CPT

## 2020-04-29 PROCEDURE — 80053 COMPREHEN METABOLIC PANEL: CPT

## 2020-04-29 PROCEDURE — 83880 ASSAY OF NATRIURETIC PEPTIDE: CPT

## 2020-04-29 PROCEDURE — 82728 ASSAY OF FERRITIN: CPT

## 2020-04-29 PROCEDURE — 80202 ASSAY OF VANCOMYCIN: CPT

## 2020-04-29 PROCEDURE — 82330 ASSAY OF CALCIUM: CPT

## 2020-04-29 PROCEDURE — 84132 ASSAY OF SERUM POTASSIUM: CPT

## 2020-04-29 PROCEDURE — 85379 FIBRIN DEGRADATION QUANT: CPT

## 2020-04-29 PROCEDURE — 82435 ASSAY OF BLOOD CHLORIDE: CPT

## 2020-04-29 PROCEDURE — 82947 ASSAY GLUCOSE BLOOD QUANT: CPT

## 2020-04-29 PROCEDURE — 99238 HOSP IP/OBS DSCHRG MGMT 30/<: CPT

## 2020-04-29 PROCEDURE — 84295 ASSAY OF SERUM SODIUM: CPT

## 2020-04-29 PROCEDURE — 87635 SARS-COV-2 COVID-19 AMP PRB: CPT

## 2020-04-29 PROCEDURE — 96375 TX/PRO/DX INJ NEW DRUG ADDON: CPT

## 2020-04-29 PROCEDURE — 84100 ASSAY OF PHOSPHORUS: CPT

## 2020-04-29 PROCEDURE — 86140 C-REACTIVE PROTEIN: CPT

## 2020-04-29 PROCEDURE — 80197 ASSAY OF TACROLIMUS: CPT

## 2020-04-29 PROCEDURE — 87640 STAPH A DNA AMP PROBE: CPT

## 2020-04-29 PROCEDURE — 85384 FIBRINOGEN ACTIVITY: CPT

## 2020-04-29 PROCEDURE — 97161 PT EVAL LOW COMPLEX 20 MIN: CPT

## 2020-04-29 PROCEDURE — 81001 URINALYSIS AUTO W/SCOPE: CPT

## 2020-04-29 PROCEDURE — 87086 URINE CULTURE/COLONY COUNT: CPT

## 2020-04-29 PROCEDURE — 87449 NOS EACH ORGANISM AG IA: CPT

## 2020-04-29 PROCEDURE — 84145 PROCALCITONIN (PCT): CPT

## 2020-04-29 PROCEDURE — 87040 BLOOD CULTURE FOR BACTERIA: CPT

## 2020-04-29 PROCEDURE — 96374 THER/PROPH/DIAG INJ IV PUSH: CPT

## 2020-04-29 RX ORDER — FOLIC ACID 0.8 MG
1 TABLET ORAL
Qty: 30 | Refills: 0
Start: 2020-04-29 | End: 2020-05-28

## 2020-04-29 RX ORDER — MAGNESIUM OXIDE 400 MG ORAL TABLET 241.3 MG
1 TABLET ORAL
Qty: 0 | Refills: 0 | DISCHARGE

## 2020-04-29 RX ORDER — FUROSEMIDE 40 MG
1 TABLET ORAL
Qty: 0 | Refills: 0 | DISCHARGE

## 2020-04-29 RX ORDER — PANTOPRAZOLE SODIUM 20 MG/1
1 TABLET, DELAYED RELEASE ORAL
Qty: 30 | Refills: 0
Start: 2020-04-29 | End: 2020-05-28

## 2020-04-29 RX ORDER — METOPROLOL TARTRATE 50 MG
0.5 TABLET ORAL
Qty: 0 | Refills: 0 | DISCHARGE

## 2020-04-29 RX ORDER — ALPRAZOLAM 0.25 MG
1 TABLET ORAL
Qty: 3 | Refills: 0
Start: 2020-04-29 | End: 2020-05-01

## 2020-04-29 RX ORDER — POLYMYXIN B SULF/TRIMETHOPRIM 10000-1/ML
1 DROPS OPHTHALMIC (EYE)
Qty: 0 | Refills: 0 | DISCHARGE

## 2020-04-29 RX ORDER — METOPROLOL TARTRATE 50 MG
1 TABLET ORAL
Qty: 30 | Refills: 0
Start: 2020-04-29 | End: 2020-05-28

## 2020-04-29 RX ORDER — MAGNESIUM SULFATE 500 MG/ML
1 VIAL (ML) INJECTION ONCE
Refills: 0 | Status: COMPLETED | OUTPATIENT
Start: 2020-04-29 | End: 2020-04-29

## 2020-04-29 RX ADMIN — Medication 100 GRAM(S): at 09:26

## 2020-04-29 RX ADMIN — PANTOPRAZOLE SODIUM 40 MILLIGRAM(S): 20 TABLET, DELAYED RELEASE ORAL at 04:41

## 2020-04-29 RX ADMIN — Medication 10 MILLIGRAM(S): at 04:41

## 2020-04-29 RX ADMIN — ATOVAQUONE 750 MILLIGRAM(S): 750 SUSPENSION ORAL at 04:40

## 2020-04-29 RX ADMIN — SERTRALINE 50 MILLIGRAM(S): 25 TABLET, FILM COATED ORAL at 12:25

## 2020-04-29 RX ADMIN — Medication 1 MILLIGRAM(S): at 12:25

## 2020-04-29 RX ADMIN — VORICONAZOLE 200 MILLIGRAM(S): 10 INJECTION, POWDER, LYOPHILIZED, FOR SOLUTION INTRAVENOUS at 04:42

## 2020-04-29 RX ADMIN — TACROLIMUS 0.5 MILLIGRAM(S): 5 CAPSULE ORAL at 12:25

## 2020-04-29 RX ADMIN — URSODIOL 300 MILLIGRAM(S): 250 TABLET, FILM COATED ORAL at 04:41

## 2020-04-29 RX ADMIN — FINASTERIDE 5 MILLIGRAM(S): 5 TABLET, FILM COATED ORAL at 12:25

## 2020-04-29 RX ADMIN — Medication 400 MILLIGRAM(S): at 04:40

## 2020-04-29 RX ADMIN — Medication 1 TABLET(S): at 12:25

## 2020-04-29 NOTE — DISCHARGE NOTE PROVIDER - CARE PROVIDER_API CALL
Rosina Gaytan)  Medical Oncology  [address] [place] [address]  Phone: [phone]  Fax: [phone]  Follow Up Time:     Sunny Jarvis)  Internal Medicine  [address] [place] [address]  Phone: [phone]  Fax: [phone]  Follow Up Time:

## 2020-04-29 NOTE — PROGRESS NOTE ADULT - ASSESSMENT
· Assessment		  60 m with HTN, AML s/p bone marrow transplant 10/2019, DVT/PE (not on AC due to thrombocytopenia),  factor V Leiden and HFrEF, BIBEMS for dyspnea was hypotensive and required levophed by EMS but responded to fluids  today afebrile ,spo2 98% on 10L NRB  leukocytosis  improved  after antibiotics  COVID negative x 3  CXR: b/l opacities, R>L  Bld and Ucxs neg    # hypoxic respiratory failure and b/l pneumonia but COVID negative x 3 ?false neg and/or  ?bacterial pneumonia in the setting on AML s/o BMT 10/2019, WBC improved to normal on antibiotics  urine legionella negative, MRSA swab negative, blood cx negative  -ID and hematology consult appreciated   -QTc prolong and pt also on voriconazole,So Plaquenil discontinued QTC today 525   -completed vanco and cefepime 4/28  --c/w mepron, voriconazole and acyclovir ppx  -slowly improving Hypoxia requiring O2 supplement - saturating at 91 to 96 on 4 LNC monitor the O2sat goal 92-96 %-- titrate oxygen a tolerated down - will need Spo2 with ambulation if desaturates in upper 80's will need home oxygen prior to discharge   -- advised pt out of bed to chair as tolerated   -- pt evaluation appreciated   --d/c planning     #loss of appetite- improving -encouraged ensure - pt  requested Chocolate flavor     DVT prophylaxis - not on anticoagulation due to thrombocytopenia- advised leg and foot movements OOB to chair encouraged     Spoke with Pt Emergency contact -Roommate Roger (nurse)  on phone update pt condition 4/25th , called left message x 2 on 4/26th , spoke with Roger updated pt condition afternoon 1 pm

## 2020-04-29 NOTE — PROGRESS NOTE ADULT - SUBJECTIVE AND OBJECTIVE BOX
Follow Up:  sepsis, pneumonia    Interval History: completed a 7 day course of antibiotics yesterday now feels better and only on NC 3 liters    ROS:      All other systems negative    Constitutional: no fever, no chills, improved appetite  Eyes: no vision changes, no eye pain  ENT:  no sore throat, no rhinorrhea  Cardiovascular:  no chest pain, no palpitation  Respiratory:  improved SOB, no cough  GI:  no abd pain, no vomiting, no diarrhea  urinary: no dysuria, no hematuria, no flank pain  musculoskeletal:  no joint pain, no joint swelling  skin:  no rash  neurology:  no headache, no seizure        Allergies  No Known Allergies        ANTIMICROBIALS:  acyclovir   Oral Tab/Cap 400 two times a day  atovaquone Suspension 750 two times a day  voriconazole 200 every 12 hours      OTHER MEDS:  ALPRAZolam 0.25 milliGRAM(s) Oral at bedtime  finasteride 5 milliGRAM(s) Oral daily  folic acid 1 milliGRAM(s) Oral daily  multivitamin 1 Tablet(s) Oral daily  pantoprazole    Tablet 40 milliGRAM(s) Oral before breakfast  predniSONE   Tablet 10 milliGRAM(s) Oral daily  sertraline 50 milliGRAM(s) Oral daily  sodium chloride 0.65% Nasal 1 Spray(s) Both Nostrils three times a day PRN  tacrolimus 0.5 milliGRAM(s) Oral daily  tamsulosin 0.4 milliGRAM(s) Oral at bedtime  triamcinolone 0.1% Cream 1 Application(s) Topical two times a day  trimethoprim/polymyxin Solution 1 Drop(s) Both EYES two times a day  ursodiol Capsule 300 milliGRAM(s) Oral two times a day      Vital Signs Last 24 Hrs  T(C): 36.9 (29 Apr 2020 04:37), Max: 36.9 (28 Apr 2020 20:47)  T(F): 98.4 (29 Apr 2020 04:37), Max: 98.4 (28 Apr 2020 20:47)  HR: 98 (29 Apr 2020 04:37) (98 - 111)  BP: 122/78 (29 Apr 2020 04:37) (121/73 - 172/60)  BP(mean): --  RR: 18 (29 Apr 2020 04:37) (18 - 20)  SpO2: 91% (29 Apr 2020 04:37) (91% - 96%)    Physical Exam:  General:   non toxic  Head: atraumatic, normocephalic  Eye: normal sclera and conjunctiva  ENT:    no oropharyngeal lesions  Cardio:     regular S1, S2  Respiratory:  no resp distress on NC 3 liters, clear  abd:     soft,   BS +,   no tenderness  :   no CVAT,  no suprapubic tenderness,   no  saucedo  Musculoskeletal:   no joint swelling,   no edema  vascular: no phlebitis, normal pulses  Skin:    no rash  Neurologic:     no focal deficit  psych: normal affect                          8.5    6.42  )-----------( 51       ( 29 Apr 2020 04:53 )             26.5       04-29    135  |  100  |  20  ----------------------------<  113<H>  4.5   |  25  |  0.90    Ca    8.8      29 Apr 2020 04:53  Phos  2.9     04-29  Mg     1.8     04-29    TPro  5.5<L>  /  Alb  3.3  /  TBili  0.3  /  DBili  x   /  AST  12  /  ALT  8<L>  /  AlkPhos  70  04-29          MICROBIOLOGY:  v  .Blood Blood-Venous  04-23-20   No Growth Final  --  --      .Urine Clean Catch (Midstream)  04-22-20   No growth  --  --          CMVPCR Log: NotDetec Drm10AA/mL (04-27 @ 08:11)        RADIOLOGY:  Images below independently visualized and reviewed personally, findings as below  < from: Xray Chest 1 View- PORTABLE-Urgent (04.22.20 @ 17:56) >    IMPRESSION:   Bilateral nonspecific hazy opacities, right greater than left. Differential includes viral pneumonia secondary to COVID-19.

## 2020-04-29 NOTE — PROGRESS NOTE ADULT - ATTENDING COMMENTS
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 Airborne isolation  Tacro level= 8.9 on 4/24; BC(4/23)- negative to date  Plan-  Patient immunosuppressed s/p allogeneic PSCT, on Tacrolimus. Agree with treatment for possible bacterial pneumonia with Cefepime/Vanco IV.   Continue Acyclovir, Mepron, Vfend  Continue Prednisone 10 mg po daily.   Continue Tacrolimus, check level daily, stable level 8.9 on 4/25  Continue Kyle s/p transplant  Continue Folate, MVI, PPI  Continue O2 supplement, monitor O2 sats

## 2020-04-29 NOTE — PROGRESS NOTE ADULT - ASSESSMENT
60 m with HTN, AML s/p bone marrow transplant 10/2019, DVT/PE (not on AC due to thrombocytopenia),  factor V Leiden and HFrEF, BIBEMS for dyspnea was hypotensive and required levophed by EMS but responded to fluids  here febrile to 100.1, BP in 80s-90s, tachycardic and tachypneic on NRB  leukocytosis 19 improved to 13 after antibiotics  COVID negative x 2  CXR: b/l opacities, R>L    fever, tachycardia, hypotension, leukocytosis, septic shock  hypoxic respiratory failure and b/l pneumonia but COVID negative x 3 ?bacterial pneumonia in the setting on AML s/o BMT 10/2019, WBC improved on antibiotics  urine legionella negative, MRSA swab negative, blood cx negative    * pt clinically better and WBC normalized now on NC 3liters  * completed a 7 day course of vanco and cefepime yesterday  * QTc prolong and pt also on voriconazole, so did not receive plaquenil, monitor the QTC  * c/w mepron, voriconazole and acyclovir ppx  * monitor the O2sat and taper O2 as tolerated  * will sign off, please call with questions    The above assessment and plan was discussed with medicine NP    Verito Faith MD  Pager [phone]  After 5pm and on weekends call [phone] .

## 2020-04-29 NOTE — PROGRESS NOTE ADULT - SUBJECTIVE AND OBJECTIVE BOX
[facility] Division of Hospital Medicine Progress Note    Patient is a 60y old  Male who presents with a chief complaint of SOB (29 Apr 2020 11:45)      SUBJECTIVE / OVERNIGHT EVENTS: was deployed from community to care for inpatient due to covid pandemic   ADDITIONAL REVIEW OF SYSTEMS:saw pt today resting comfortable on bed in no apparant distress , no fever , no Cp or abd pain - eager to go home     MEDICATIONS  (STANDING):  acyclovir   Oral Tab/Cap 400 milliGRAM(s) Oral two times a day  ALPRAZolam 0.25 milliGRAM(s) Oral at bedtime  atovaquone Suspension 750 milliGRAM(s) Oral two times a day  finasteride 5 milliGRAM(s) Oral daily  folic acid 1 milliGRAM(s) Oral daily  multivitamin 1 Tablet(s) Oral daily  pantoprazole    Tablet 40 milliGRAM(s) Oral before breakfast  predniSONE   Tablet 10 milliGRAM(s) Oral daily  sertraline 50 milliGRAM(s) Oral daily  tacrolimus 0.5 milliGRAM(s) Oral daily  tamsulosin 0.4 milliGRAM(s) Oral at bedtime  triamcinolone 0.1% Cream 1 Application(s) Topical two times a day  trimethoprim/polymyxin Solution 1 Drop(s) Both EYES two times a day  ursodiol Capsule 300 milliGRAM(s) Oral two times a day  voriconazole 200 milliGRAM(s) Oral every 12 hours    MEDICATIONS  (PRN):  sodium chloride 0.65% Nasal 1 Spray(s) Both Nostrils three times a day PRN Nasal Congestion      CAPILLARY BLOOD GLUCOSE        I&O's Summary    28 Apr 2020 07:01  -  29 Apr 2020 07:00  --------------------------------------------------------  IN: 538 mL / OUT: 850 mL / NET: -312 mL        PHYSICAL EXAM:  Vital Signs Last 24 Hrs  T(C): 36.9 (29 Apr 2020 04:37), Max: 36.9 (28 Apr 2020 20:47)  T(F): 98.4 (29 Apr 2020 04:37), Max: 98.4 (28 Apr 2020 20:47)  HR: 98 (29 Apr 2020 04:37) (98 - 111)  BP: 122/78 (29 Apr 2020 04:37) (121/73 - 172/60)  BP(mean): --  RR: 18 (29 Apr 2020 04:37) (18 - 20)  SpO2: 91% (29 Apr 2020 04:37) (91% - 96%)  CONSTITUTIONAL: NAD, well-developed, well-groomed  ENMT: Moist oral mucosa, no pharyngeal injection or exudates; normal dentition  RESPIRATORY: Normal respiratory effort; lungs are clear to auscultation bilaterally  CARDIOVASCULAR: Regular rate and rhythm, normal S1 and S2, no murmur/rub/gallop; No lower extremity edema; Peripheral pulses are 2+ bilaterally  ABDOMEN: Nontender to palpation, normoactive bowel sounds, no rebound/guarding; No hepatosplenomegaly  PSYCH: A+O to person, place, and time; affect appropriate  NEUROLOGY: CN 2-12 are intact and symmetric; no gross sensory deficits   SKIN: No rashes; no palpable lesions    LABS:                        8.5    6.42  )-----------( 51       ( 29 Apr 2020 04:53 )             26.5     04-29    135  |  100  |  20  ----------------------------<  113<H>  4.5   |  25  |  0.90    Ca    8.8      29 Apr 2020 04:53  Phos  2.9     04-29  Mg     1.8     04-29    TPro  5.5<L>  /  Alb  3.3  /  TBili  0.3  /  DBili  x   /  AST  12  /  ALT  8<L>  /  AlkPhos  70  04-29    PT/INR - ( 29 Apr 2020 08:03 )   PT: 16.1 sec;   INR: 1.40 ratio         PTT - ( 29 Apr 2020 08:03 )  PTT:25.7 sec          COVID-19 PCR: NotDetec (04-28-20 @ 15:05)  COVID-19 PCR: NotDetec (04-23-20 @ 13:56)  COVID-19 PCR: NotDetec (04-22-20 @ 20:58)  COVID-19 PCR: NotDetec (04-22-20 @ 17:50)      RADIOLOGY & ADDITIONAL TESTS:  Imaging from Last 24 Hours:  Electrocardiogram/QTc Interval:    COORDINATION OF CARE:  Care Discussed with Consultants/Other Providers:

## 2020-04-29 NOTE — DISCHARGE NOTE PROVIDER - NSDCFUSCHEDAPPT_GEN_ALL_CORE_FT
DI CONTRERAS D ; 04/30/2020 ; NPP [facility]  DI CONTRERAS D ; 05/08/2020 ; NPP [facility]  DI CONTRERAS ; 05/14/2020 ; NPP [facility]  DI CONTRERAS D ; 05/22/2020 ; NPP [facility]  DI CONTRERAS D ; 05/28/2020 ; [facility]  DI CONTRERAS ; 06/01/2020 ; NPP [facility] [address]  DI CONTRERAS ; 06/05/2020 ; NPP [facility]  DI CONTRERAS ; 06/12/2020 ; NPP [facility]  DI CONTRERAS ; 06/18/2020 ; NPP [facility]  DI CONTRERAS ; 06/26/2020 ; [facility]  [facility]ERAS ; 07/02/2020 ; [facility] DI CONTRERAS D ; 04/30/2020 ; NPP [facility]  DI CONTRERAS D ; 05/08/2020 ; NPP [facility]  DI CONTRERAS ; 05/14/2020 ; NPP [facility]  DI CONTRERAS D ; 05/22/2020 ; NPP [facility]  DI CONTRERAS D ; 05/28/2020 ; [facility]  [facility]ERAS ; 06/01/2020 ; NPP [facility] [address]  DI CONTRERAS ; 06/05/2020 ; NPP [facility]  DI CONTRERAS ; 06/12/2020 ; NPP [facility]  DI CONTRERAS ; 06/18/2020 ; NPP [facility]  DI CONTRERAS ; 06/26/2020 ; [facility]  [facility] ; 07/02/2020 ; [facility] D ; 04/30/2020 ; NPP [facility]  DI CONTRERAS D ; 05/08/2020 ; NPP [facility]  DI CONTRERAS ; 05/14/2020 ; NPP [facility]  DI CONTRERAS D ; 05/22/2020 ; NPP [facility]  DI CONTRERAS D ; 05/28/2020 ; [facility]  DI [facility]ERAS ; 06/01/2020 ; NPP [address] [address]  DI CONTRERAS ; 06/05/2020 ; NPP [facility]  DI CONTRERAS ; 06/12/2020 ; NPP [facility]  DI CONTRERAS ; 06/18/2020 ; NPP [facility]  DI CONTRERAS ; 06/26/2020 ; [facility]  [facility] ; 07/02/2020 ; [facility] DI [facility]AS D ; 04/30/2020 ; NPP [facility]  DI CONTRERAS D ; 05/08/2020 ; NPP [facility]  DI CONTRERAS ; 05/14/2020 ; NPP [facility]  DI CONTRERAS D ; 05/22/2020 ; NPP [facility]  DI CONTRERAS D ; 05/28/2020 ; [facility]  DI [facility]ERAS ; 06/01/2020 ; NPP [address] [address]  DI CONTRERAS ; 06/05/2020 ; NPP [facility]  DI CONTRERAS ; 06/12/2020 ; NPP [facility]  DI CONTRERAS ; 06/18/2020 ; NPP [facility]  DI CONTRERAS ; 06/26/2020 ; [facility]  DI CONTRERAS ; 07/02/2020 ; [facility]

## 2020-04-29 NOTE — DISCHARGE NOTE PROVIDER - NSDCFUADDAPPT_GEN_ALL_CORE_FT
follow up with Berkley Lambert on 6/5 @ 12 noon and Dr. Gaytan on 6/18 @ 12 noon  follow up with primary care physician Dr. Jarvis within one week after discharge

## 2020-04-29 NOTE — DISCHARGE NOTE PROVIDER - NSDCCPCAREPLAN_GEN_ALL_CORE_FT
PRINCIPAL DISCHARGE DIAGNOSIS  Diagnosis: Septic shock  Assessment and Plan of Treatment: diagnsed w/ bacterial Pnueonia finished antibiotics yesterday 4/28  Pneumonia is a lung infection that can cause a fever, cough, and trouble breathing.  Nutrition is important, eat small frequent meals.  Get lots of rest and drink fluids.  Call your health care provider upon arrival home from hospital and make a follow up appointment for one week.  If your cough worsens, you develop fever greater than 101', you have shaking chills, a fast heartbeat, trouble breathing and/or feel your are breathing much faster than usual, call your healthcare provider.  Make sure you wash your hands frequently.  use incentive spiromety every hour while awake (10X)        SECONDARY DISCHARGE DIAGNOSES  Diagnosis: Hypoxia  Assessment and Plan of Treatment: resolved  O2 sat w/ ambulation & room air 94% - does not qualify for oxygen  COVID x 4 negative    Diagnosis: AML (acute myeloid leukemia)  Assessment and Plan of Treatment: AML (acute myeloid leukemia) w/ recent bone marrow transplant  follow up closely with [facility] elijah & Dr. Gaytan as directed  good handwashing  take prophylactic antibiotics as prescribed PRINCIPAL DISCHARGE DIAGNOSIS  Diagnosis: Septic shock  Assessment and Plan of Treatment: diagnsed w/ bacterial Pnueonia finished antibiotics yesterday 4/28  Pneumonia is a lung infection that can cause a fever, cough, and trouble breathing.  Nutrition is important, eat small frequent meals.  Get lots of rest and drink fluids.  Call your health care provider upon arrival home from hospital and make a follow up appointment for one week.  If your cough worsens, you develop fever greater than 101', you have shaking chills, a fast heartbeat, trouble breathing and/or feel your are breathing much faster than usual, call your healthcare provider.  Make sure you wash your hands frequently.  use incentive spiromety every hour while awake (10X)        SECONDARY DISCHARGE DIAGNOSES  Diagnosis: Pulmonary emboli  Assessment and Plan of Treatment: with deep vein thombosis history  no anticoagulation due to low platelet count om 50,000's  mobility is important  If you develop shortness of breath or if your shortness of breath worsens call your Health Care Provider or go to the Emergency Department.      Diagnosis: CHF (congestive heart failure)  Assessment and Plan of Treatment: CHF (congestive heart failure)  Weigh yourself daily.  If you gain 3lbs in 3 days, or 5lbs in a week call your Health Care Provider.  Do not eat or drink foods containing more than 2000mg of salt (sodium) in your diet every day.  Call your Health Care Provider if you have any swelling or increased swelling in your feet, ankles, and/or stomach.  Take all of your medication as directed.  If you become dizzy call your Health Care Provider.      Diagnosis: Hypoxia  Assessment and Plan of Treatment: resolved  O2 sat w/ ambulation & room air 94% - does not qualify for oxygen  COVID x 4 negative    Diagnosis: AML (acute myeloid leukemia)  Assessment and Plan of Treatment: AML (acute myeloid leukemia) w/ recent bone marrow transplant  follow up closely with [facility] & Dr. Gaytan as directed  good handwashing  take prophylactic antibiotics as prescribed

## 2020-04-29 NOTE — DISCHARGE NOTE PROVIDER - NSDCMRMEDTOKEN_GEN_ALL_CORE_FT
acyclovir 400 mg oral tablet: 1 tab(s) orally 2 times a day  ALPRAZolam 0.5 mg oral tablet: 1 tab(s) orally once a day (at bedtime) MDD:MDD 1 tab daily  atovaquone 750 mg/5 mL oral suspension: 5 milliliter(s) orally 2 times a day  finasteride 5 mg oral tablet: 1 tab(s) orally once a day  folic acid 1 mg oral tablet: 1 tab(s) orally once a day  Metoprolol Tartrate 25 mg oral tablet: 0.5 tab(s) = 12.5 mg orally 2 times a day  Multiple Vitamins oral tablet: 1 tab(s) orally once a day  pantoprazole 40 mg oral delayed release tablet: 1 tab(s) orally once a day (1/2 hour before breakfastl)  polymyxin B-trimethoprim 10,000 units-1 mg/mL ophthalmic solution: 1 drop(s) to both  eyes 2 times a day  predniSONE 10 mg oral tablet: 1 tab(s) orally once a day  sertraline 50 mg oral tablet: 1 tab(s) orally once a day  tacrolimus 0.5 mg oral capsule: 1 cap(s) orally once a day  tamsulosin 0.4 mg oral capsule: 1 cap(s) orally once a day (at bedtime)  triamcinolone 0.1% topical cream: Apply topically to affected area 2 times a day  ursodiol 300 mg oral capsule: 1 cap(s) orally 2 times a day (with meals)  voriconazole 200 mg oral tablet: 1 tab(s) orally every 12 hours acyclovir 400 mg oral tablet: 1 tab(s) orally 2 times a day  ALPRAZolam 0.5 mg oral tablet: 1 tab(s) orally once a day (at bedtime) MDD:MDD 1 tab daily  atovaquone 750 mg/5 mL oral suspension: 5 milliliter(s) orally 2 times a day  finasteride 5 mg oral tablet: 1 tab(s) orally once a day  folic acid 1 mg oral tablet: 1 tab(s) orally once a day  Multiple Vitamins oral tablet: 1 tab(s) orally once a day  pantoprazole 40 mg oral delayed release tablet: 1 tab(s) orally once a day (1/2 hour before breakfastl)  polymyxin B-trimethoprim 10,000 units-1 mg/mL ophthalmic solution: 1 drop(s) to both  eyes 2 times a day  predniSONE 10 mg oral tablet: 1 tab(s) orally once a day  sertraline 50 mg oral tablet: 1 tab(s) orally once a day  tacrolimus 0.5 mg oral capsule: 1 cap(s) orally once a day  tamsulosin 0.4 mg oral capsule: 1 cap(s) orally once a day (at bedtime)  Toprol-XL 25 mg oral tablet, extended release: 1 tab(s) orally once a day   triamcinolone 0.1% topical cream: Apply topically to affected area 2 times a day  ursodiol 300 mg oral capsule: 1 cap(s) orally 2 times a day (with meals)  voriconazole 200 mg oral tablet: 1 tab(s) orally every 12 hours

## 2020-04-29 NOTE — PROGRESS NOTE ADULT - SUBJECTIVE AND OBJECTIVE BOX
[facility] Transplant Team                                                      Critical / Counseling Time Provided: 30 minutes                                                                                                                                                        Chief Complaint: Shortness of breath     S: Patient seen and examined with [facility] Transplant Team:   + fatigue   + dyspnea - improving   + occasional cough     O: Vitals:   Vital Signs Last 24 Hrs  T(C): 36.9 (2020 04:37), Max: 36.9 (2020 20:47)  T(F): 98.4 (2020 04:37), Max: 98.4 (2020 20:47)  HR: 98 (2020 04:37) (98 - 111)  BP: 122/78 (2020 04:37) (121/73 - 172/60)  BP(mean): --  RR: 18 (2020 04:37) (18 - 20)  SpO2: 91% (2020 04:37) (91% - 96%)    Admit weight:  90.7 kg   Daily Weight in k.2 (2020 12:04)    Intake / Output:    @ 07:  -   @ 07:00  --------------------------------------------------------  IN: 538 mL / OUT: 850 mL / NET: -312 mL     @ 07:  -   @ 13:28  --------------------------------------------------------  IN: 240 mL / OUT: 700 mL / NET: -460 mL      PE:   Constitutional: NAD  Respiratory: CTA throughout bilaterally respiratory effort; lungs are clear to auscultation bilaterally  CV: S1, S2 RRR   Abdomen:+ BS x 4, soft, NT, ND   Extremities: no edema   Neuro: A&O x 3   Skin: No rashes; no palpable lesions    Labs:   CBC Full  -  ( 2020 04:53 )  WBC Count : 6.42 K/uL  Hemoglobin : 8.5 g/dL  Hematocrit : 26.5 %  Platelet Count - Automated : 51 K/uL  Mean Cell Volume : 115.2 fl  Mean Cell Hemoglobin : 37.0 pg  Mean Cell Hemoglobin Concentration : 32.1 gm/dL  Auto Neutrophil # : 3.06 K/uL  Auto Lymphocyte # : 2.66 K/uL  Auto Monocyte # : 0.43 K/uL  Auto Eosinophil # : 0.20 K/uL  Auto Basophil # : 0.02 K/uL  Auto Neutrophil % : 47.7 %  Auto Lymphocyte % : 41.4 %  Auto Monocyte % : 6.7 %  Auto Eosinophil % : 3.1 %  Auto Basophil % : 0.3 %                          8.5    6.42  )-----------( 51       ( 2020 04:53 )             26.5     04-    135  |  100  |  20  ----------------------------<  113<H>  4.5   |  25  |  0.90    Ca    8.8      2020 04:53  Phos  2.9       Mg     1.8         TPro  5.5<L>  /  Alb  3.3  /  TBili  0.3  /  DBili  x   /  AST  12  /  ALT  8<L>  /  AlkPhos  70  -29    PT/INR - ( 2020 08:03 )   PT: 16.1 sec;   INR: 1.40 ratio         PTT - ( 2020 08:03 )  PTT:25.7 sec  LIVER FUNCTIONS - ( 2020 04:53 )  Alb: 3.3 g/dL / Pro: 5.5 g/dL / ALK PHOS: 70 U/L / ALT: 8 U/L / AST: 12 U/L / GGT: x           Cultures:   Culture Results: blood  No Growth Final (20 @ 00:48)    Culture Results: blood  No Growth Final (20 @ 00:48)    Culture Results: urine  No growth (20 @ 22:19)      Radiology:   Radiology:   EXAM:  XR CHEST PORTABLE URGENT 1V                        PROCEDURE DATE:  2020    IMPRESSION:   Bilateral nonspecific hazy opacities, right greater than left. Differential includes viral pneumonia secondary to COVID-19.    Meds:   Antimicrobials:   acyclovir   Oral Tab/Cap 400 milliGRAM(s) Oral two times a day  atovaquone Suspension 750 milliGRAM(s) Oral two times a day  voriconazole 200 milliGRAM(s) Oral every 12 hours      Heme / Onc:       GI:  pantoprazole    Tablet 40 milliGRAM(s) Oral before breakfast  ursodiol Capsule 300 milliGRAM(s) Oral two times a day      Cardiovascular:   tamsulosin 0.4 milliGRAM(s) Oral at bedtime      Immunologic:   tacrolimus 0.5 milliGRAM(s) Oral daily      Other medications:   ALPRAZolam 0.25 milliGRAM(s) Oral at bedtime  finasteride 5 milliGRAM(s) Oral daily  folic acid 1 milliGRAM(s) Oral daily  multivitamin 1 Tablet(s) Oral daily  predniSONE   Tablet 10 milliGRAM(s) Oral daily  sertraline 50 milliGRAM(s) Oral daily  triamcinolone 0.1% Cream 1 Application(s) Topical two times a day  trimethoprim/polymyxin Solution 1 Drop(s) Both EYES two times a day      PRN:   sodium chloride 0.65% Nasal 1 Spray(s) Both Nostrils three times a day PRN    A/P: 60 year old male with a history of AML  Status Post : 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 He is now admitted with shortness of breath, and found to have likely viral pneumonia on CXR. He is COVID negative x 3    1. Infectious Disease:   acyclovir   Oral Tab/Cap 400 milliGRAM(s) Oral two times a day  atovaquone Suspension 750 milliGRAM(s) Oral two times a day  voriconazole 200 milliGRAM(s) Oral every 12 hours    2. GI Prophylaxis:   pantoprazole    Tablet 40 milliGRAM(s) Oral before breakfast    3. Mouthcare; Skin care     4. GVHD prophylaxis   tacrolimus 0.5 milliGRAM(s) Oral daily    5. Transfuse & replete electrolytes prn     6. IV hydration, daily weights, strict I&O, prn diuresis     7. PO intake as tolerated, nutrition follow up as needed, MVI, folic acid     8. Antiemetics, anti-diarrhea medications:      9. OOB as tolerated, physical therapy consult if needed     10. Monitor coags / fibrinogen 2x week, vitamin K as needed     11. Monitor closely for clinical changes, monitor for fevers     12. Emotional support provided, plan of care discussed and questions addressed     I have written the above note for Dr. Goldberg who performed service with me in the room.   Susan Grey NP-C ([phone])    I have seen and examined patient with NP, I agree with above note as scribed.

## 2020-04-29 NOTE — DISCHARGE NOTE PROVIDER - CARE PROVIDERS DIRECT ADDRESSES
[email or web address]

## 2020-04-29 NOTE — DISCHARGE NOTE PROVIDER - REASON FOR ADMISSION
SOBSOB - COVID 19 negative x 4, bacterial pneumonia finished antibiotic treatment yesterday 4/28, history of AML w/ recent bone marrow transplant & immunosuppressed SOB - COVID 19 negative x 4, bacterial pneumonia finished antibiotic treatment yesterday 4/28, history of AML w/ recent bone marrow transplant & immunosuppressed SOB

## 2020-04-30 ENCOUNTER — APPOINTMENT (OUTPATIENT)
Dept: HEMATOLOGY ONCOLOGY | Facility: CLINIC | Age: 60
End: 2020-04-30
Payer: COMMERCIAL

## 2020-04-30 PROCEDURE — 99443: CPT

## 2020-04-30 NOTE — ASSESSMENT
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 with the following comorbidities being managed:\par \par 1) AML\par S/p haplo (son) PSCT on 10/9/19\par 12/4/19 chimerism: 100% donor\par 12/24/19 chimerism = 100% donor (CD33 = 100% and CD3 = 99.3% donor)\par 1/8/20 chimerism = 100% donor\par 2/5/20 chimerism = 100% donor\par 2/21/20 chimerism = 100% donor\par 3/3/20 chimerism = 100% donor\par 3/19/20 chimerism = 100% donor \par 4/2 chimerism = 99% donor\par Post transplant BMBx pending \par \par 2) Heme\par ABO compatible transplant: pt and donor are both A pos\par Ongoing anemia and thrombocytopenia poss 2/2 Valcyte, d/c'd as of 2/14/20\par Continue multivitamin and folic acid daily\par \par Hx of recurrent DVT/PE\par Factor V Leiden reportedly runs in the family \par 11/21/19 Doppler US of RUE negative for DVT\par Xarelto on hold while pancytopenic, to resume when PLT consistently >100K\par BLE edema poss r/t prednisone, consider Doppler US if edema persists..resolved\par \par 3) ID\par Continue ppx:\par - Acyclovir 400mg bid - resumed 2/26/20\par - Mepron 750mg bid\par - Voriconazole 200mg bid - hx of aspergillosis \par Post transplant crowd and dietary restrictions reviewed\par \par 3/12/20 CXR with improving opacities in setting of recent PNA, continue to monitor \par \par CMV viremia\par CMV PCR peaked at 8,581 on 11/20/19\par 3/26/20 CMV PCR not detected \par 4/9/20 CMV PCR not detected\par Valcyte 450mg BID started 11/21/19. Decreased to QD as of 12/17/19. Held as of 2/14/20 for ongoing thrombocytopenia \par Continue to monitor PCR at ever visit \par \par 4) GVHD\par Skin 0   Liver 0   GI 0 - overall grade 0\par No signs of chronic GVHD at this time\par Off MMF as of 11/22/19 \par Continue FK 0.5 mg daily - pending today's level\par Continue prednisone 10 mg daily, initially started for FTT but developed GVHD upon tapering \par Reviewed signs/symptoms of GVHD (i.e. rash, mucositis, nausea/vomiting, diarrhea)\par \par aGVHD of skin \par Maculopapular pruritic rash of face, neck, and upper chest (BSA 19%) noted 1/21/20 in setting of tapering prednisone (for FTT) - max skin stage 1, overall grade 1\par 1/21/20: rash on face, neck, and chest (BSA 19%); skin 1, overall grade 1; prednisone increased to 10mg daily\par 2/14/20: rash on neck, chest, and bilateral upper arms (BSA 18%); skin 1, overall grade 1: prednisone increased to 20mg daily\par 2/21/20: rash on neck and chest (10% BSA); skin 1, overall grade 1. Continue prednisone 20mg daily\par 3/12/20: rash on neck and chest (10% BSA); skin 1, overall grade 1. Continue prednisone 15mg daily  \par 3/19/20: rash on neck and chest (10% BSA); skin 1, overall grade 1. Continue prednisone 15 mg daily\par 4/2/20: rash on neck and chest (10% BSA); skin 1, overall grade 1. Continue prednisone 15 mg daily\par 4/9/20: rash resolved, prednisone decreased to 10mg daily\par 4/17/20: rash on neck and chest (10% BSA); skin 1, overall grade 1. Continue prednisone 10mg daily and reinforced compliance with triamcinolone cream bid \par Continue triamcinolone 0.1% cream bid prn\par Continue to monitor closely \par \par 5) GI\par Continue ppx:\par - Protonix 40 mg daily\par - Ursodiol 300 mg bid\par PRN Zofran and Reglan for nausea/vomiting\par \par 6) Failure to thrive\par Poor PO intake/hydration with resulting hypotension\par Prednisone 20 mg daily started 11/20/19 with resolution of symptoms\par RESOLVED\par \par 7) Cardiac\par HTN - continue metoprolol succinate 12.5mg bid. Continue to hold enalapril 2.5mg daily in light of recent hypotension\par Cardiomyopathy - continue Lasix 20mg daily for suspected fluid retention causing SOB\par \par 8) Other\par BPH - continue finasteride 5mg daily and tamsulosin 0.4mg daily \par Hypomagnesemia - likely 2/2 FK, continue MgOx 400mg tid\par Anxiety/Depression - continue Zoloft 50mg daily\par Insomnia - continue PRN Xanax 0.5-1mg qhs \par \par 9) Plan/Dispo\par Pt educated regarding plan of care, all questions/concerns addressed\par Instructed to contact our office immediately with fever or new/worsening yesterday\par Covid-19 restrictions and CDC recs reviewed\par Continue weekly appts at this time given anemia/thrombocytopenia \par time on phone 23 min

## 2020-05-08 ENCOUNTER — RESULT REVIEW (OUTPATIENT)
Age: 60
End: 2020-05-08

## 2020-05-08 ENCOUNTER — APPOINTMENT (OUTPATIENT)
Dept: HEMATOLOGY ONCOLOGY | Facility: CLINIC | Age: 60
End: 2020-05-08
Payer: COMMERCIAL

## 2020-05-08 ENCOUNTER — OUTPATIENT (OUTPATIENT)
Dept: OUTPATIENT SERVICES | Facility: HOSPITAL | Age: 60
LOS: 1 days | End: 2020-05-08
Payer: COMMERCIAL

## 2020-05-08 VITALS
DIASTOLIC BLOOD PRESSURE: 81 MMHG | TEMPERATURE: 98 F | HEART RATE: 80 BPM | WEIGHT: 197.09 LBS | OXYGEN SATURATION: 94 % | BODY MASS INDEX: 26.73 KG/M2 | SYSTOLIC BLOOD PRESSURE: 120 MMHG | RESPIRATION RATE: 16 BRPM

## 2020-05-08 DIAGNOSIS — C92.00 ACUTE MYELOBLASTIC LEUKEMIA, NOT HAVING ACHIEVED REMISSION: ICD-10-CM

## 2020-05-08 LAB
ALBUMIN SERPL ELPH-MCNC: 4.1 G/DL
ALP BLD-CCNC: 76 U/L
ALT SERPL-CCNC: 9 U/L
ANION GAP SERPL CALC-SCNC: 15 MMOL/L
AST SERPL-CCNC: 12 U/L
BASOPHILS # BLD AUTO: 0.04 K/UL — SIGNIFICANT CHANGE UP (ref 0–0.2)
BASOPHILS NFR BLD AUTO: 0.4 % — SIGNIFICANT CHANGE UP (ref 0–2)
BILIRUB SERPL-MCNC: 0.2 MG/DL
BLD GP AB SCN SERPL QL: NEGATIVE — SIGNIFICANT CHANGE UP
BUN SERPL-MCNC: 18 MG/DL
CALCIUM SERPL-MCNC: 9.3 MG/DL
CHLORIDE SERPL-SCNC: 105 MMOL/L
CO2 SERPL-SCNC: 21 MMOL/L
CREAT SERPL-MCNC: 0.99 MG/DL
EOSINOPHIL # BLD AUTO: 0.14 K/UL — SIGNIFICANT CHANGE UP (ref 0–0.5)
EOSINOPHIL NFR BLD AUTO: 1.4 % — SIGNIFICANT CHANGE UP (ref 0–6)
GLUCOSE SERPL-MCNC: 124 MG/DL
HCT VFR BLD CALC: 29.4 % — LOW (ref 39–50)
HGB BLD-MCNC: 9.1 G/DL — LOW (ref 13–17)
IMM GRANULOCYTES NFR BLD AUTO: 0.7 % — SIGNIFICANT CHANGE UP (ref 0–1.5)
LDH SERPL-CCNC: 227 U/L
LYMPHOCYTES # BLD AUTO: 48.9 % — HIGH (ref 13–44)
LYMPHOCYTES # BLD AUTO: 5 K/UL — HIGH (ref 1–3.3)
MAGNESIUM SERPL-MCNC: 1.7 MG/DL
MCHC RBC-ENTMCNC: 31 GM/DL — LOW (ref 32–36)
MCHC RBC-ENTMCNC: 37 PG — HIGH (ref 27–34)
MCV RBC AUTO: 119.5 FL — HIGH (ref 80–100)
MONOCYTES # BLD AUTO: 0.79 K/UL — SIGNIFICANT CHANGE UP (ref 0–0.9)
MONOCYTES NFR BLD AUTO: 7.7 % — SIGNIFICANT CHANGE UP (ref 2–14)
NEUTROPHILS # BLD AUTO: 4.18 K/UL — SIGNIFICANT CHANGE UP (ref 1.8–7.4)
NEUTROPHILS NFR BLD AUTO: 40.9 % — LOW (ref 43–77)
NRBC # BLD: 0 /100 WBCS — SIGNIFICANT CHANGE UP (ref 0–0)
PLATELET # BLD AUTO: 61 K/UL — LOW (ref 150–400)
POTASSIUM SERPL-SCNC: 4.6 MMOL/L
PROT SERPL-MCNC: 5.9 G/DL
RBC # BLD: 2.46 M/UL — LOW (ref 4.2–5.8)
RBC # FLD: 17.1 % — HIGH (ref 10.3–14.5)
RH IG SCN BLD-IMP: POSITIVE — SIGNIFICANT CHANGE UP
SODIUM SERPL-SCNC: 141 MMOL/L
WBC # BLD: 10.22 K/UL — SIGNIFICANT CHANGE UP (ref 3.8–10.5)
WBC # FLD AUTO: 10.22 K/UL — SIGNIFICANT CHANGE UP (ref 3.8–10.5)

## 2020-05-08 PROCEDURE — 86901 BLOOD TYPING SEROLOGIC RH(D): CPT

## 2020-05-08 PROCEDURE — 86850 RBC ANTIBODY SCREEN: CPT

## 2020-05-08 PROCEDURE — 86900 BLOOD TYPING SEROLOGIC ABO: CPT

## 2020-05-08 PROCEDURE — 99214 OFFICE O/P EST MOD 30 MIN: CPT

## 2020-05-08 RX ORDER — AMOXICILLIN AND CLAVULANATE POTASSIUM 875; 125 MG/1; MG/1
875-125 TABLET, COATED ORAL
Qty: 14 | Refills: 0 | Status: DISCONTINUED | COMMUNITY
Start: 2020-04-21 | End: 2020-05-08

## 2020-05-08 RX ORDER — FLUTICASONE FUROATE 27.5 UG/1
27.5 SPRAY, METERED NASAL DAILY
Qty: 1 | Refills: 0 | Status: DISCONTINUED | COMMUNITY
Start: 2020-03-19 | End: 2020-05-08

## 2020-05-08 RX ORDER — ALPRAZOLAM 0.5 MG/1
0.5 TABLET ORAL
Qty: 30 | Refills: 0 | Status: DISCONTINUED | COMMUNITY
Start: 2019-11-07 | End: 2020-05-08

## 2020-05-08 RX ORDER — POLYMYXIN B SULFATE AND TRIMETHOPRIM 10000; 1 [USP'U]/ML; MG/ML
10000-0.1 SOLUTION OPHTHALMIC 4 TIMES DAILY
Qty: 3 | Refills: 0 | Status: DISCONTINUED | COMMUNITY
Start: 2020-04-09 | End: 2020-05-08

## 2020-05-08 RX ORDER — METOPROLOL TARTRATE 25 MG/1
25 TABLET, FILM COATED ORAL TWICE DAILY
Qty: 15 | Refills: 3 | Status: DISCONTINUED | COMMUNITY
Start: 2020-01-06 | End: 2020-05-08

## 2020-05-08 NOTE — HISTORY OF PRESENT ILLNESS
[de-identified] : Mr. Delong is a 58 y/o male, with a previously diagnosed acute myeloid leukemia. A bone marrow biopsy from 7/3 revealed Acute myeloid leukemia (AML). FISH - report shows a population of aberrant myeloid blasts. He is currently being treated by Dr. Coelho for high risk AML with HIDAC consolidation chemotherapy. He was referred by Dr. Coehlo for an initial consultation of a Haplo- transplant. \par \par The patient has a history of multiple blood clots - factor V Leiden runs in the family. He was diagnosed with sleep apnea, but refuses to use CPAP. He is a former smoker, 40 years. He also has a past medical history of PE, HTN, cardiomyopathy. He recently underwent an amputation of the right first toe due to an infection.\par \par S/p hospitalization at [facility] 10/3/19 - 11/1/19 for haplo (son) SCT. \par Upon admission, a TLC was placed in IR. Mr. Delong received IV fluid hydration, pain management, nutritional support, anxiolysis, antiemetics, and antiviral,  antifungal, antibacterial, GI, PCP and VOD prophylaxis. When his ANC dropped  below 1000, he was started on prophylactic Cefepime. Labs were monitored on a daily basis and he received transfusional support and electrolyte repletion as needed. \par \par While in patient, Mr. Delong was continued on his Voriconazole for history of aspergillosis. \par \par S/p hospitalization at [facility] 10/3/19 - 11/1/19 for haplo (son) SCT. \par Upon admission, a TLC was placed in IR. Mr. Delong received IV fluid hydration, pain management, nutritional support, anxiolysis, antiemetics, and antiviral, antifungal, antibacterial, GI, PCP and VOD prophylaxis. When his ANC dropped below 1000, he was started on prophylactic Cefepime. Labs were monitored on a daily basis and he received transfusional support and electrolyte repletion as needed. \par \par While in patient, Mr. Delong was continued on his Voriconazole for history of aspergillosis. \par \par During admission, Mr. Delong had pancytopenia related to the high dose chemotherapy prep regimen. He also experienced neutropenic fevers. When he became febrile, blood and urine cultures were sent and a CXR was completed which were unremarkable. \par \par On 10/9/2019, following premedications, pt received 250 ml of allogeneic, related, haplo, fresh, mobilized HPC apheresis over 30-60 minutes. \par Cell counts as follows: \par \par Total MNCs (x10^8/kg) = 5.17 \par CD 34 cells (x10^6/kg) = 7.34 \par CD 3 Cells (x 10^7/kg) = 19.81 \par Cell viability (%) = 100 \par \par Pt tolerated infusion without any adverse reaction or complications. \par \par Engraftment was noted on 10/28/2019. Daily Zarxio was discontinued, as was Cefepime given negative cultures. Mr. Delong was discharged home to f/u at the [facility].  [de-identified] : On 5/8/20 visit, day +212 of SCT today. S/p hospitalization at [facility] 4/22-4/29 for bacterial PNA for which abx were completed 4/28, covid negative x 4 during hospitalization. Overall feeling well. Reports adequate PO intake and hydration. Ongoing SOB with exertion is somewhat improved since before hospitalization. Ongoing occasional dry cough. Ongoing LLE edema for which he has not been using PRN Lasix. Waxing and waning pruritus and mild erythema of upper chest and upper back. Compliant with post transplant meds and restrictions. Currently on FK 0.5mg dialy which he did not take before lab draw, and prednisone 10mg daily. Denies fever, chills, headache, dizziness, blurred vision, mucositis/odynophagia, chest pain, nausea/vomiting, diarrhea/constipation, abdominal pain, dysuria, bleeding, pain

## 2020-05-08 NOTE — ASSESSMENT
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 with the following comorbidities being managed:\par \par 1) AML\par S/p haplo (son) PSCT on 10/9/19\par 12/4/19 chimerism: 100% donor\par 12/24/19 chimerism = 100% donor (CD33 = 100% and CD3 = 99.3% donor)\par 1/8/20 chimerism = 100% donor\par 2/5/20 chimerism = 100% donor\par 2/21/20 chimerism = 100% donor\par 3/3/20 chimerism = 100% donor\par 3/19/20 chimerism = 100% donor \par 4/2/20 chimerism = 99% donor\par 4/9/29 chimerism = 99% donor (CD33 = 97% and CD3 = 100% donor)\par Post transplant BMBx pending \par Current disease status: remains VINCE at this time, post transplant BMbx pending \par \par 2) Heme\par ABO compatible transplant: pt and donor are both A pos\par Ongoing anemia and thrombocytopenia, poss in setting of recent infection vs poor graft function \par    5/8/20:   WBC 10.22   ANC 4.18   Hgb 9.1   PLT 61\par Continue multivitamin and folic acid daily\par \par Hx of recurrent DVT/PE\par Factor V Leiden reportedly runs in the family \par 11/21/19 Doppler US of RUE negative for DVT\par Xarelto on hold while pancytopenic, to resume when PLT consistently >100K\par LE edema poss r/t prednisone, consider Doppler US if edema persists\par May use PRN Lasix 20mg today 5/8 and Sun 5/10 to see if edema improves \par \par 3) ID\par Continue ppx:\par - Acyclovir 400mg bid \par - Mepron 750mg bid\par - Voriconazole 200mg bid - hx of aspergillosis \par Post transplant crowd and dietary restrictions reviewed - in light of covid-19, [facility] recs reviewed and strict restrictions reinforced \par \par CMV viremia\par CMV PCR peaked at 8,581 on 11/20/19\par Valcyte 450mg BID started 11/21/19. Decreased to QD as of 12/17/19. Held as of 2/14/20 for ongoing thrombocytopenia \par 3/26/20 CMV PCR not detected \par 4/9/20 CMV PCR not detected\par 4/27/20 CMV PCR not detected\par Continue to monitor PCR at ever visit \par \par 4) GVHD\par Skin 0   Liver 0   GI 0 - overall grade 0\par No signs of chronic GVHD at this time\par Off MMF as of 11/22/19 \par Continue FK 0.5 mg daily - pending today's level\par Continue prednisone 10 mg daily, initially started for FTT but developed GVHD upon tapering \par Reviewed signs/symptoms of GVHD (i.e. rash, mucositis, nausea/vomiting, diarrhea)\par \par aGVHD of skin \par Maculopapular pruritic rash of face, neck, and upper chest (BSA 19%) noted 1/21/20 in setting of tapering prednisone (for FTT) - max skin stage 1, overall grade 1\par 1/21/20: rash on face, neck, and chest (BSA 19%); skin 1, overall grade 1; prednisone increased to 10mg daily\par 2/14/20: rash on neck, chest, and bilateral upper arms (BSA 18%); skin 1, overall grade 1: prednisone increased to 20mg daily\par 2/21/20: rash on neck and chest (10% BSA); skin 1, overall grade 1. Continue prednisone 20mg daily\par 3/12/20: rash on neck and chest (10% BSA); skin 1, overall grade 1. Continue prednisone 15mg daily  \par 3/19/20: rash on neck and chest (10% BSA); skin 1, overall grade 1. Continue prednisone 15 mg daily\par 4/2/20: rash on neck and chest (10% BSA); skin 1, overall grade 1. Continue prednisone 15 mg daily\par 4/9/20: rash resolved, prednisone decreased to 10mg daily\par 4/17/20: rash on neck and chest (10% BSA); skin 1, overall grade 1. Continue prednisone 10mg daily and reinforced compliance with triamcinolone cream bid \par 5/8/20: waxing and waning pruritic erythema of upper back, unsure if GVHD. Continue prednisone 10mg daily at this time\par Continue triamcinolone 0.1% cream bid PRN rash \par Continue to monitor closely \par \par 5) GI\par Continue ppx:\par - Protonix 40 mg daily\par - Ursodiol 300 mg bid\par PRN Zofran and Reglan for nausea/vomiting\par \par 6) Failure to thrive\par Poor PO intake/hydration with resulting hypotension\par Prednisone 20 mg daily started 11/20/19 with resolution of symptoms\par RESOLVED\par \par 7) Cardiac\par HTN - continue metoprolol succinate 25mg daily\par Cardiomyopathy - continue PRN Lasix 20mg daily for suspected fluid retention\par \par 8) Other\par BPH - continue finasteride 5mg daily and tamsulosin 0.4mg daily \par Hypomagnesemia - likely 2/2 FK, continue MgOx 400mg tid\par Anxiety/Depression - continue Zoloft 50mg daily\par Insomnia - continue PRN Xanax 0.5-1mg qhs \par \par 9) Plan/Dispo\par Pt educated regarding plan of care, all questions/concerns addressed\par Instructed to contact our office immediately with fever or new/worsening yesterday\par F/u with NP on 5/14/20

## 2020-05-08 NOTE — PHYSICAL EXAM
[Ambulatory and capable of all self care but unable to carry out any work activities] : Status 2- Ambulatory and capable of all self care but unable to carry out any work activities. Up and about more than 50% of waking hours [Normal] : grossly intact [de-identified] : +BS; abdomen soft, non-distended, non-tender [de-identified] : LLE edema  [de-identified] : very faint erythema of upper back

## 2020-05-11 LAB — TACROLIMUS SERPL-MCNC: 5.6 NG/ML

## 2020-05-12 LAB
CMV DNA SPEC QL NAA+PROBE: NOT DETECTED
CMVPCR LOG: NOT DETECTED LOG10IU/ML

## 2020-05-14 ENCOUNTER — RESULT REVIEW (OUTPATIENT)
Age: 60
End: 2020-05-14

## 2020-05-14 ENCOUNTER — LABORATORY RESULT (OUTPATIENT)
Age: 60
End: 2020-05-14

## 2020-05-14 ENCOUNTER — APPOINTMENT (OUTPATIENT)
Dept: HEMATOLOGY ONCOLOGY | Facility: CLINIC | Age: 60
End: 2020-05-14
Payer: COMMERCIAL

## 2020-05-14 VITALS
BODY MASS INDEX: 27.51 KG/M2 | RESPIRATION RATE: 18 BRPM | WEIGHT: 202.82 LBS | HEART RATE: 104 BPM | DIASTOLIC BLOOD PRESSURE: 74 MMHG | OXYGEN SATURATION: 95 % | SYSTOLIC BLOOD PRESSURE: 128 MMHG | TEMPERATURE: 97.9 F

## 2020-05-14 DIAGNOSIS — D61.818 OTHER PANCYTOPENIA: ICD-10-CM

## 2020-05-14 LAB
ALBUMIN SERPL ELPH-MCNC: 4.1 G/DL
ALP BLD-CCNC: 83 U/L
ALT SERPL-CCNC: 12 U/L
ANION GAP SERPL CALC-SCNC: 13 MMOL/L
AST SERPL-CCNC: 14 U/L
BASOPHILS # BLD AUTO: 0.03 K/UL — SIGNIFICANT CHANGE UP (ref 0–0.2)
BASOPHILS NFR BLD AUTO: 0.3 % — SIGNIFICANT CHANGE UP (ref 0–2)
BILIRUB SERPL-MCNC: 0.2 MG/DL
BUN SERPL-MCNC: 19 MG/DL
CALCIUM SERPL-MCNC: 9.4 MG/DL
CHLORIDE SERPL-SCNC: 103 MMOL/L
CO2 SERPL-SCNC: 25 MMOL/L
CREAT SERPL-MCNC: 1.02 MG/DL
ENGRAFTMNET-POST: NORMAL
EOSINOPHIL # BLD AUTO: 0.24 K/UL — SIGNIFICANT CHANGE UP (ref 0–0.5)
EOSINOPHIL NFR BLD AUTO: 2.2 % — SIGNIFICANT CHANGE UP (ref 0–6)
GLUCOSE SERPL-MCNC: 147 MG/DL
HCT VFR BLD CALC: 30.2 % — LOW (ref 39–50)
HGB BLD-MCNC: 9.5 G/DL — LOW (ref 13–17)
IMM GRANULOCYTES NFR BLD AUTO: 0.8 % — SIGNIFICANT CHANGE UP (ref 0–1.5)
LDH SERPL-CCNC: 225 U/L
LYMPHOCYTES # BLD AUTO: 48.6 % — HIGH (ref 13–44)
LYMPHOCYTES # BLD AUTO: 5.4 K/UL — HIGH (ref 1–3.3)
MAGNESIUM SERPL-MCNC: 1.8 MG/DL
MCHC RBC-ENTMCNC: 31.5 GM/DL — LOW (ref 32–36)
MCHC RBC-ENTMCNC: 36.8 PG — HIGH (ref 27–34)
MCV RBC AUTO: 117.1 FL — HIGH (ref 80–100)
MONOCYTES # BLD AUTO: 0.79 K/UL — SIGNIFICANT CHANGE UP (ref 0–0.9)
MONOCYTES NFR BLD AUTO: 7.1 % — SIGNIFICANT CHANGE UP (ref 2–14)
NEUTROPHILS # BLD AUTO: 4.55 K/UL — SIGNIFICANT CHANGE UP (ref 1.8–7.4)
NEUTROPHILS NFR BLD AUTO: 41 % — LOW (ref 43–77)
NRBC # BLD: 0 /100 WBCS — SIGNIFICANT CHANGE UP (ref 0–0)
PLATELET # BLD AUTO: 46 K/UL — LOW (ref 150–400)
POTASSIUM SERPL-SCNC: 4.5 MMOL/L
PROT SERPL-MCNC: 5.7 G/DL
RBC # BLD: 2.58 M/UL — LOW (ref 4.2–5.8)
RBC # FLD: 17.2 % — HIGH (ref 10.3–14.5)
SODIUM SERPL-SCNC: 140 MMOL/L
WBC # BLD: 11.1 K/UL — HIGH (ref 3.8–10.5)
WBC # FLD AUTO: 11.1 K/UL — HIGH (ref 3.8–10.5)

## 2020-05-14 PROCEDURE — 99215 OFFICE O/P EST HI 40 MIN: CPT

## 2020-05-15 PROBLEM — D61.818 PANCYTOPENIA: Status: ACTIVE | Noted: 2019-11-08

## 2020-05-15 LAB — TACROLIMUS SERPL-MCNC: 7 NG/ML

## 2020-05-15 RX ORDER — PREDNISONE 5 MG/1
5 TABLET ORAL
Qty: 30 | Refills: 0 | Status: DISCONTINUED | COMMUNITY
Start: 2019-11-26 | End: 2020-05-15

## 2020-05-15 NOTE — ASSESSMENT
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 Post transplant BMbx pending \par \par 2) Heme\par ABO compatible transplant: pt and donor are both A pos\par Ongoing anemia and thrombocytopenia, poss in setting of recent infection vs poor graft function \par    5/14/20:   WBC 11.1   ANC 4.55   Hgb 9.5   PLT 46\par Continue multivitamin and folic acid daily\par \par Hx of recurrent DVT/PE\par Factor V Leiden reportedly runs in the family \par 11/21/19 Doppler US of RUE negative for DVT\par Xarelto on hold while pancytopenic, to resume when PLT consistently >100K\par LE edema poss r/t prednisone, consider Doppler US if edema persists\par Continue Lasix 20 mg every other day\par \par 3) ID\par Continue ppx:\par - Acyclovir 400mg bid \par - Mepron 750mg bid\par - Voriconazole 200mg bid - hx of aspergillosis \par Post transplant crowd and dietary restrictions reviewed - in light of covid-19, CDC recs reviewed and strict restrictions reinforced \par \par CMV viremia\par CMV PCR peaked at 8,581 on 11/20/19\par Valcyte 450mg BID started 11/21/19. Decreased to QD as of 12/17/19. Held as of 2/14/20 for ongoing thrombocytopenia \par 5/8//20 CMV PCR not detected\par Continue to monitor PCR at ever visit \par \par 4) GVHD\par Skin 1  Liver 0   GI 0 - overall grade 1\par No signs of chronic GVHD at this time\par Off MMF as of 11/22/19 \par Continue FK 0.5 mg daily - pending today's level\par Continue prednisone 10 mg daily, initially started for FTT but developed GVHD upon tapering \par Reviewed signs/symptoms of GVHD (i.e. rash, mucositis, nausea/vomiting, diarrhea)\par \par aGVHD of skin \par Maculopapular pruritic rash of face, neck, and upper chest (BSA 19%) noted 1/21/20 in setting of tapering prednisone (for FTT) - max skin stage 1, overall grade 1\par 1/21/20: rash on face, neck, and chest (BSA 19%); skin 1, overall grade 1; prednisone increased to 10mg daily\par 2/14/20: rash on neck, chest, and bilateral upper arms (BSA 18%); skin 1, overall grade 1: prednisone increased to 20mg daily\par 2/21/20: rash on neck and chest (10% BSA); skin 1, overall grade 1. Continue prednisone 20mg daily\par 3/12/20: rash on neck and chest (10% BSA); skin 1, overall grade 1. Continue prednisone 15mg daily  \par 3/19/20: rash on neck and chest (10% BSA); skin 1, overall grade 1. Continue prednisone 15 mg daily\par 4/2/20: rash on neck and chest (10% BSA); skin 1, overall grade 1. Continue prednisone 15 mg daily\par 4/9/20: rash resolved, prednisone decreased to 10mg daily\par 4/17/20: rash on neck and chest (10% BSA); skin 1, overall grade 1. Continue prednisone 10mg daily and reinforced compliance with triamcinolone cream bid \par 5/8/20: waxing and waning pruritic erythema of upper back, unsure if GVHD. Continue prednisone 10mg daily at this time\par 5/14/20: mild erythema of neck and upper back ( 5% BSA); skin 1, overall grade 1. Continue prednisone 10 mg daily.\par Continue triamcinolone 0.1% cream bid PRN rash \par Continue to monitor closely \par \par 5) GI\par Continue ppx:\par - Protonix 40 mg daily\par - Ursodiol 300 mg bid\par PRN Zofran and Reglan for nausea/vomiting\par \par 6) Failure to thrive\par Poor PO intake/hydration with resulting hypotension\par Prednisone 20 mg daily started 11/20/19 with resolution of symptoms\par RESOLVED\par \par 7) Cardiac\par HTN - continue metoprolol succinate 25mg daily\par Cardiomyopathy - continue Lasix 20 mg every other day for suspected fluid retention. F/U cardiologist in June 2020.\par \par 8) Other\par BPH - continue finasteride 5mg daily and tamsulosin 0.4mg daily \par Hypomagnesemia - likely 2/2 FK, continue MgOx 400mg tid\par Anxiety/Depression - continue Zoloft 50mg daily\par Insomnia - continue PRN Xanax 0.5-1mg qhs \par \par 9) Plan/Dispo\par Pt educated regarding plan of care, all questions/concerns addressed\par Instructed to contact our office immediately with fever or new/worsening yesterday\par F/u with NP on 5/22/20

## 2020-05-15 NOTE — HISTORY OF PRESENT ILLNESS
[de-identified] : Mr. Delong is a 58 y/o male, with a previously diagnosed acute myeloid leukemia. A bone marrow biopsy from 7/3 revealed Acute myeloid leukemia (AML). FISH - report shows a population of aberrant myeloid blasts. He is currently being treated by Dr. Coelho for high risk AML with HIDAC consolidation chemotherapy. He was referred by Dr. Coelho for an initial consultation of a Haplo- transplant. \par \par The patient has a history of multiple blood clots - factor V Leiden runs in the family. He was diagnosed with sleep apnea, but refuses to use CPAP. He is a former smoker, 40 years. He also has a past medical history of PE, HTN, cardiomyopathy. He recently underwent an amputation of the right first toe due to an infection.\par \par S/p hospitalization at [facility] 10/3/19 - 11/1/19 for haplo (son) SCT. \par Upon admission, a TLC was placed in IR. Mr. Delong received IV fluid hydration, pain management, nutritional support, anxiolysis, antiemetics, and antiviral,  antifungal, antibacterial, GI, PCP and VOD prophylaxis. When his ANC dropped  below 1000, he was started on prophylactic Cefepime. Labs were monitored on a daily basis and he received transfusional support and electrolyte repletion as needed. \par \par While in patient, Mr. Delong was continued on his Voriconazole for history of aspergillosis. \par \par S/p hospitalization at [facility] 10/3/19 - 11/1/19 for haplo (son) SCT. \par Upon admission, a TLC was placed in IR. Mr. Delong received IV fluid hydration, pain management, nutritional support, anxiolysis, antiemetics, and antiviral, antifungal, antibacterial, GI, PCP and VOD prophylaxis. When his ANC dropped below 1000, he was started on prophylactic Cefepime. Labs were monitored on a daily basis and he received transfusional support and electrolyte repletion as needed. \par \par While in patient, Mr. Delong was continued on his Voriconazole for history of aspergillosis. \par \par During admission, Mr. Delong had pancytopenia related to the high dose chemotherapy prep regimen. He also experienced neutropenic fevers. When he became febrile, blood and urine cultures were sent and a CXR was completed which were unremarkable. \par \par On 10/9/2019, following premedications, pt received 250 ml of allogeneic, related, haplo, fresh, mobilized HPC apheresis over 30-60 minutes. \par Cell counts as follows: \par \par Total MNCs (x10^8/kg) = 5.17 \par CD 34 cells (x10^6/kg) = 7.34 \par CD 3 Cells (x 10^7/kg) = 19.81 \par Cell viability (%) = 100 \par \par Pt tolerated infusion without any adverse reaction or complications. \par \par Engraftment was noted on 10/28/2019. Daily Zarxio was discontinued, as was Cefepime given negative cultures. Mr. Delong was discharged home to f/u at the [facility].  [de-identified] : On 5/8/20 visit, day +212 of SCT today. S/p hospitalization at [facility] 4/22-4/29 for bacterial PNA for which abx were completed 4/28, covid negative x 4 during hospitalization. Overall feeling well. Reports adequate PO intake and hydration. Ongoing SOB with exertion is somewhat improved since before hospitalization. Ongoing occasional dry cough. Ongoing LLE edema for which he has not been using PRN Lasix. Waxing and waning pruritus and mild erythema of upper chest and upper back. Compliant with post transplant meds and restrictions. Currently on FK 0.5mg dialy which he did not take before lab draw, and prednisone 10mg daily. Denies fever, chills, headache, dizziness, blurred vision, mucositis/odynophagia, chest pain, nausea/vomiting, diarrhea/constipation, abdominal pain, dysuria, bleeding, pain \par \par On 5/14/20 visit, day + 218 of SCT today. Patient is well overall and offers no acute concerns. Pruritus of upper back. Mild erythema of neck and upper back. Applying triamcinolone cream BID. Currently on Fk 0.5 mg daily and prednisone 10 mg daily. Ongoing LLE edema for which patient has been taking lasix every other day. Dyspnea on exertion. Denies fever, chills, nausea, vomiting, diarrhea, mouth sores, dysuria or any signs of active bleeding. Denies chest pain. Remains compliant with post transplant diet and crowd restrictions. Remains compliant with post transplant medications.

## 2020-05-15 NOTE — REVIEW OF SYSTEMS
[Negative] : Heme/Lymph [Lower Ext Edema] : lower extremity edema [SOB on Exertion] : shortness of breath during exertion [Chest Pain] : no chest pain [Palpitations] : no palpitations [Leg Claudication] : no intermittent leg claudication [Shortness Of Breath] : no shortness of breath [Wheezing] : no wheezing [Cough] : no cough [FreeTextEntry5] : LLE edema

## 2020-05-15 NOTE — PHYSICAL EXAM
[Ambulatory and capable of all self care but unable to carry out any work activities] : Status 2- Ambulatory and capable of all self care but unable to carry out any work activities. Up and about more than 50% of waking hours [Normal] : normoactive bowel sounds, soft and nontender, no hepatosplenomegaly or masses appreciated [de-identified] : LLE edema [de-identified] : very faint erythema of upper back and neck [de-identified] : +BS; abdomen soft, non-distended, non-tender

## 2020-05-16 LAB — CMV DNA SPEC QL NAA+PROBE: NOT DETECTED

## 2020-05-19 ENCOUNTER — OUTPATIENT (OUTPATIENT)
Dept: OUTPATIENT SERVICES | Facility: HOSPITAL | Age: 60
LOS: 1 days | Discharge: HOME | End: 2020-05-19

## 2020-05-19 DIAGNOSIS — C92.00 ACUTE MYELOBLASTIC LEUKEMIA, NOT HAVING ACHIEVED REMISSION: ICD-10-CM

## 2020-05-19 LAB
CD3 AND CD33 ENRICHMENT: NORMAL
ENGRAFTMNET-POST: NORMAL

## 2020-05-20 NOTE — HISTORY OF PRESENT ILLNESS
[de-identified] : Mr. Delong is a 60 y/o male, with a previously diagnosed acute myeloid leukemia. A bone marrow biopsy from 7/3 revealed Acute myeloid leukemia (AML). FISH - report shows a population of aberrant myeloid blasts. He is currently being treated by Dr. Coelho for high risk AML with HIDAC consolidation chemotherapy. He was referred by Dr. Coelho for an initial consultation of a Haplo- transplant. \par \par The patient has a history of multiple blood clots - factor V Leiden runs in the family. He was diagnosed with sleep apnea, but refuses to use CPAP. He is a former smoker, 40 years. He also has a past medical history of PE, HTN, cardiomyopathy. He recently underwent an amputation of the right first toe due to an infection.\par \par S/p hospitalization at [facility] 10/3/19 - 11/1/19 for haplo (son) SCT. \par Upon admission, a TLC was placed in IR. Mr. Delong received IV fluid hydration, pain management, nutritional support, anxiolysis, antiemetics, and antiviral,  antifungal, antibacterial, GI, PCP and VOD prophylaxis. When his ANC dropped  below 1000, he was started on prophylactic Cefepime. Labs were monitored on a daily basis and he received transfusional support and electrolyte repletion as needed. \par \par While in patient, Mr. Delong was continued on his Voriconazole for history of aspergillosis. \par \par S/p hospitalization at [facility] 10/3/19 - 11/1/19 for haplo (son) SCT. \par Upon admission, a TLC was placed in IR. Mr. Delong received IV fluid hydration, pain management, nutritional support, anxiolysis, antiemetics, and antiviral, antifungal, antibacterial, GI, PCP and VOD prophylaxis. When his ANC dropped below 1000, he was started on prophylactic Cefepime. Labs were monitored on a daily basis and he received transfusional support and electrolyte repletion as needed. \par \par While in patient, Mr. Delong was continued on his Voriconazole for history of aspergillosis. \par \par During admission, Mr. Delong had pancytopenia related to the high dose chemotherapy prep regimen. He also experienced neutropenic fevers. When he became febrile, blood and urine cultures were sent and a CXR was completed which were unremarkable. \par \par On 10/9/2019, following premedications, pt received 250 ml of allogeneic, related, haplo, fresh, mobilized HPC apheresis over 30-60 minutes. \par Cell counts as follows: \par \par Total MNCs (x10^8/kg) = 5.17 \par CD 34 cells (x10^6/kg) = 7.34 \par CD 3 Cells (x 10^7/kg) = 19.81 \par Cell viability (%) = 100 \par \par Pt tolerated infusion without any adverse reaction or complications. \par \par Engraftment was noted on 10/28/2019. Daily Zarxio was discontinued, as was Cefepime given negative cultures. Mr. Delong was discharged home to f/u at the [facility].  [de-identified] : On 3/12/20 visit, day +155 of SCT today. Ongoing SOB with exertion is stable compared to last week. Pruritus has resolved but still with rash on neck and chest. Otherwise feeling well with good PO intake/hydration. Compliant with post transplant meds and restrictions. Currently taking FK 0.5mg daily which he did not take prior to lab draw today, and prednisone 15mg daily. Denies fever, chills, headache, dizziness, blurred vision, mucositis/odynophagia, chest pain, cough, nausea/vomiting, diarrhea/constipation, abdominal pain, dysuria, LE edema, bleeding, pain \par \par On 3/19/20 visit, day + 161 of SCT today. Ongoing shortness of breath with exertion is stable compared to last week. Mild erythematous rash of chest and neck. Overall feeling well. On FK 0.5 mg daily, prednisone 15 mg daily and lasix 20 mg daily. Mild edema of left lower extremity. Denies fever, chills, nausea, vomiting, diarrhea, mouth sores, dysuria or any signs of active bleeding. Denies chest pain. Remains compliant with post transplant diet and crowd restrictions. Remains compliant with post transplant medications. \par \par On 3/26/20, day + 168 s/p haplo (son) PBSCT. His GEORGES is stable, no SOB at rest, no chest pains. His chronic cough is stable.  He feels that rash on neck and chest are resolving with topical steroid, also on  Prednisone. He denies fever, chills, sore throat, nausea, vomiting, abdominal pain, diarrhea.

## 2020-05-20 NOTE — PHYSICAL EXAM
[Ambulatory and capable of all self care but unable to carry out any work activities] : Status 2- Ambulatory and capable of all self care but unable to carry out any work activities. Up and about more than 50% of waking hours [Normal] : affect appropriate [de-identified] : anicteric [de-identified] : RRR, normal S1S2 [de-identified] : LLE>RLE edema [de-identified] : +BS; abdomen soft, non-distended, non-tender [de-identified] : erythematous rash of neck and chest(improving) [de-identified] : A & O x 4

## 2020-05-20 NOTE — REVIEW OF SYSTEMS
[Fatigue] : fatigue [Lower Ext Edema] : lower extremity edema [Skin Rash] : skin rash [Anxiety] : anxiety [Fever] : no fever [Chills] : no chills [Eye Pain] : no eye pain [Night Sweats] : no night sweats [Dry Eyes] : no dryness of the eyes [Vision Problems] : no vision problems [Nosebleeds] : no nosebleeds [Dysphagia] : no dysphagia [Odynophagia] : no odynophagia [Mucosal Pain] : no mucosal pain [Chest Pain] : no chest pain [Abdominal Pain] : no abdominal pain [Vomiting] : no vomiting [Constipation] : no constipation [Dysuria] : no dysuria [Diarrhea] : no diarrhea [Joint Pain] : no joint pain [Muscle Pain] : no muscle pain [Fainting] : no fainting [Dizziness] : no dizziness [Easy Bleeding] : no tendency for easy bleeding [Easy Bruising] : no tendency for easy bruising [FreeTextEntry5] : left lower extremity edema > right [FreeTextEntry6] : dyspnea on exertion, occasional cough-non-productive [FreeTextEntry7] : no nausea [FreeTextEntry9] : no bone pain [de-identified] : rash on neck and chest [de-identified] : manageable with Xanax

## 2020-05-20 NOTE — ASSESSMENT
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 with the following comorbidities being managed:\par \par 1) AML\par S/p haplo (son) PSCT on 10/9/19\par 12/4/19 chimerism: 100% donor\par 12/24/19 chimerism = 100% donor (CD33 = 100% and CD3 = 99.3% donor)\par 1/8/20 chimerism = 100% donor\par 2/5/20 chimerism = 100% donor\par 2/21/20 chimerism = 100% donor\par 3/3/20 chimerism = 100% donor\par Post transplant BMBx pending \par \par 2) Heme\par ABO compatible transplant: pt and donor are both A pos\par Ongoing anemia and thrombocytopenia poss 2/2 Valcyte, d/c'd as of 2/14/20\par No indication for transfusion today\par    3/26/20:   WBC 11.4,  Hgb 8.3,   PLT 32K\par \par Continue multivitamin and folic acid daily\par \par Hx of recurrent DVT/PE\par Factor V Leiden reportedly runs in the family \par 11/21/19 Doppler US of RUE negative for DVT\par Xarelto on hold while pancytopenic, to resume when PLT consistently >100K\par Consider Doppler US of LEs if edema worsens\par \par 3) ID\par Continue ppx:\par - Acyclovir 400mg bid resumed 2/26/20\par - Mepron 750mg bid\par - Voriconazole 200mg bid - hx of aspergillosis \par Post transplant crowd and dietary restrictions reviewed\par \par 3/12/20 CXR with improving opacities in setting of recent PNA, continue to monitor \par CMV viremia\par 11/7/19 CMV PCR = 247\par 11/14/19 CMV PCR = 2,205\par 11/20/19 CMV PCR = 8,581\par 11/26/19 CMV PCR = 4103\par 12/4/19 CMV PCR = 215\par 3/12/20 CMV PCR  less than 50.\par 3/19/20 CMV PCR- not detected. \par Valcyte 450mg BID started 11/21/19. Decreased to QD as of 12/17/19. Held as of 2/14/20 for ongoing thrombocytopenia \par Continue to monitor PCR weekly \par \par 4) GVHD\par Skin 1   Liver 0   GI 0 - overall grade 1\par No signs of chronic GVHD at this time\par Off MMF as of 11/22/19 \par Continue FK 0.5 mg daily - pending today's level\par Continue prednisone 15 mg daily, initially started for FTT but developed GVHD upon tapering \par Reviewed signs/symptoms of GVHD (i.e. rash, mucositis, nausea/vomiting, diarrhea)\par \par aGVHD of skin \par Maculopapular pruritic rash of face, neck, and upper chest (BSA 19%) noted 1/21/20 in setting of tapering prednisone (for FTT) - max skin stage 1, overall grade 1\par 1/21/20: rash on face, neck, and chest (BSA 19%); skin 1, overall grade 1; prednisone increased to 10mg daily\par 2/14/20: rash on neck, chest, and bilateral upper arms (BSA 18%); skin 1, overall grade 1: prednisone increased to 20mg daily\par 2/21/20: rash on neck and chest (10% BSA); skin 1, overall grade 1. Continue prednisone 20mg daily\par 3/12/20: rash on neck and chest (10% BSA); skin 1, overall grade 1. Continue prednisone 15mg daily  \par 3/19/20: rash on neck and chest (10% BSA); skin 1, overall grade 1. Continue prednisone 15 mg daily\par 3/26/20: rash on neck and chest (10% BSA); skin 1, overall grade 1. Continue prednisone 15 mg daily\par Continue triamcinolone 0.1% cream bid\par Continue to monitor closely \par \par 5) GI\par Continue ppx:\par - Protonix 40 mg daily\par - Ursodiol 300 mg bid\par PRN Zofran and Reglan for nausea/vomiting\par \par 6) Failure to thrive\par Poor PO intake/hydration with resulting hypotension\par Prednisone 20 mg daily started 11/20/19 with resolution of symptoms\par RESOLVED\par \par 7) Cardiac\par HTN - continue metoprolol succinate 12.5mg bid. Continue to hold enalapril 2.5mg daily in light of recent hypotension\par Cardiomyopathy - continue Lasix 20mg daily for suspected fluid retention causing SOB\par F/u with cardiologist and repeat ECHO on 3/30/20 \par \par 8) Other\par BPH - continue finasteride 5mg daily and tamsulosin 0.4mg daily \par Hypomagnesemia - likely 2/2 FK, continue MgOx 400mg tid\par Anxiety/Depression - continue Zoloft 50mg daily and PRN Xanax 0.5mg qhs\par \par 9) Plan/Dispo\par Pt educated regarding plan of care, all questions/concerns addressed\par Medications sent to local pharmacy today\par Continue weekly appts at this time\par F/u with Transplant NP on 04/02/20. Has a possible platelet appointment on 04/02/20.

## 2020-05-21 ENCOUNTER — RX RENEWAL (OUTPATIENT)
Age: 60
End: 2020-05-21

## 2020-05-22 ENCOUNTER — RESULT REVIEW (OUTPATIENT)
Age: 60
End: 2020-05-22

## 2020-05-22 ENCOUNTER — APPOINTMENT (OUTPATIENT)
Dept: HEMATOLOGY ONCOLOGY | Facility: CLINIC | Age: 60
End: 2020-05-22
Payer: COMMERCIAL

## 2020-05-22 VITALS
DIASTOLIC BLOOD PRESSURE: 85 MMHG | TEMPERATURE: 98.7 F | SYSTOLIC BLOOD PRESSURE: 124 MMHG | BODY MASS INDEX: 26.73 KG/M2 | HEART RATE: 95 BPM | RESPIRATION RATE: 18 BRPM | WEIGHT: 197.07 LBS | OXYGEN SATURATION: 94 %

## 2020-05-22 LAB
ALBUMIN SERPL ELPH-MCNC: 4.5 G/DL
ALP BLD-CCNC: 77 U/L
ALT SERPL-CCNC: 11 U/L
ANION GAP SERPL CALC-SCNC: 13 MMOL/L
AST SERPL-CCNC: 16 U/L
BASOPHILS # BLD AUTO: 0.02 K/UL — SIGNIFICANT CHANGE UP (ref 0–0.2)
BASOPHILS NFR BLD AUTO: 0.2 % — SIGNIFICANT CHANGE UP (ref 0–2)
BILIRUB SERPL-MCNC: 0.3 MG/DL
BUN SERPL-MCNC: 21 MG/DL
CALCIUM SERPL-MCNC: 9.2 MG/DL
CHLORIDE SERPL-SCNC: 103 MMOL/L
CO2 SERPL-SCNC: 23 MMOL/L
CREAT SERPL-MCNC: 1.22 MG/DL
EOSINOPHIL # BLD AUTO: 0.2 K/UL — SIGNIFICANT CHANGE UP (ref 0–0.5)
EOSINOPHIL NFR BLD AUTO: 1.6 % — SIGNIFICANT CHANGE UP (ref 0–6)
GLUCOSE SERPL-MCNC: 111 MG/DL
HCT VFR BLD CALC: 30.1 % — LOW (ref 39–50)
HGB BLD-MCNC: 9.2 G/DL — LOW (ref 13–17)
IMM GRANULOCYTES NFR BLD AUTO: 0.6 % — SIGNIFICANT CHANGE UP (ref 0–1.5)
LDH SERPL-CCNC: 223 U/L
LYMPHOCYTES # BLD AUTO: 50.1 % — HIGH (ref 13–44)
LYMPHOCYTES # BLD AUTO: 6.45 K/UL — HIGH (ref 1–3.3)
MAGNESIUM SERPL-MCNC: 1.9 MG/DL
MCHC RBC-ENTMCNC: 30.6 GM/DL — LOW (ref 32–36)
MCHC RBC-ENTMCNC: 36.8 PG — HIGH (ref 27–34)
MCV RBC AUTO: 120.4 FL — HIGH (ref 80–100)
MONOCYTES # BLD AUTO: 0.99 K/UL — HIGH (ref 0–0.9)
MONOCYTES NFR BLD AUTO: 7.7 % — SIGNIFICANT CHANGE UP (ref 2–14)
NEUTROPHILS # BLD AUTO: 5.13 K/UL — SIGNIFICANT CHANGE UP (ref 1.8–7.4)
NEUTROPHILS NFR BLD AUTO: 39.8 % — LOW (ref 43–77)
NRBC # BLD: 0 /100 WBCS — SIGNIFICANT CHANGE UP (ref 0–0)
PLATELET # BLD AUTO: 55 K/UL — LOW (ref 150–400)
POTASSIUM SERPL-SCNC: 4.7 MMOL/L
PROT SERPL-MCNC: 6.1 G/DL
RBC # BLD: 2.5 M/UL — LOW (ref 4.2–5.8)
RBC # FLD: 17.3 % — HIGH (ref 10.3–14.5)
SODIUM SERPL-SCNC: 139 MMOL/L
WBC # BLD: 12.87 K/UL — HIGH (ref 3.8–10.5)
WBC # FLD AUTO: 12.87 K/UL — HIGH (ref 3.8–10.5)

## 2020-05-22 PROCEDURE — 99214 OFFICE O/P EST MOD 30 MIN: CPT

## 2020-05-22 NOTE — ASSESSMENT
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 Post transplant BMbx pending \par \par 2) Heme\par ABO compatible transplant: pt and donor are both A pos\par Ongoing anemia and thrombocytopenia, poss in setting of recent infection vs poor graft function \par    5/22/20:   WBC 12.87   ANC 5.13   Hgb 9.2   PLT 55\par Continue multivitamin and folic acid daily\par \par Hx of recurrent DVT/PE\par Factor V Leiden reportedly runs in the family \par 11/21/19 Doppler US of RUE negative for DVT\par Xarelto on hold while pancytopenic, to resume when PLT consistently >100K\par LE edema poss r/t prednisone, consider Doppler US if edema persists - reportedly improved on 5/22/20 \par Continue Lasix 20 mg every other day\par \par 3) ID\par Continue ppx:\par - Acyclovir 400mg bid \par - Mepron 750mg bid\par - Voriconazole 200mg bid - hx of aspergillosis \par Post transplant crowd and dietary restrictions reviewed - in light of covid-19, CDC recs reviewed and strict restrictions reinforced \par \par CMV viremia\par CMV PCR peaked at 8,581 on 11/20/19\par Valcyte 450mg BID started 11/21/19. Decreased to QD as of 12/17/19. Held as of 2/14/20 for ongoing thrombocytopenia \par 5/8/20 CMV PCR not detected\par 5/14/20 CMV PCR not detected\par Continue to monitor PCR at ever visit \par \par 4) GVHD\par Skin 1  Liver 0   GI 0 - overall grade 1\par No signs of chronic GVHD at this time\par Off MMF as of 11/22/19 \par Continue FK 0.5 mg daily - pending today's level\par Prednisone decreased to 5mg daily as of 5/22/20 for drop in chimerism\par Reviewed signs/symptoms of GVHD (i.e. rash, mucositis, nausea/vomiting, diarrhea)\par \par aGVHD of skin \par Maculopapular pruritic rash of face, neck, and upper chest (BSA 19%) noted 1/21/20 in setting of tapering prednisone (for FTT) - max skin stage 1, overall grade 1\par 1/21/20: rash on face, neck, and chest (BSA 19%); skin 1, overall grade 1; prednisone increased to 10mg daily\par 2/14/20: rash on neck, chest, and bilateral upper arms (BSA 18%); skin 1, overall grade 1: prednisone increased to 20mg daily\par 2/21/20: rash on neck and chest (10% BSA); skin 1, overall grade 1. Continue prednisone 20mg daily\par 3/12/20: rash on neck and chest (10% BSA); skin 1, overall grade 1. Continue prednisone 15mg daily  \par 3/19/20: rash on neck and chest (10% BSA); skin 1, overall grade 1. Continue prednisone 15 mg daily\par 4/2/20: rash on neck and chest (10% BSA); skin 1, overall grade 1. Continue prednisone 15 mg daily\par 4/9/20: rash resolved, prednisone decreased to 10mg daily\par 4/17/20: rash on neck and chest (10% BSA); skin 1, overall grade 1. Continue prednisone 10mg daily and reinforced compliance with triamcinolone cream bid \par 5/8/20: waxing and waning pruritic erythema of upper back, unsure if GVHD. Continue prednisone 10mg daily at this time\par 5/14/20: mild erythema of neck and upper back ( 5% BSA); skin 1, overall grade 1. Continue prednisone 10 mg daily\par 5/22/20: faint pruritic erythema of upper chest and upper back (9% BSA); skin 1, overall grade 1. Prednisone decreased 5mg daily for drop in chimerism \par Continue triamcinolone 0.1% cream bid PRN rash \par Continue to monitor closely \par \par 5) GI\par Continue ppx:\par - Protonix 40 mg daily\par - Ursodiol 300 mg bid\par PRN Zofran and Reglan for nausea/vomiting\par \par 6) Failure to thrive\par Poor PO intake/hydration with resulting hypotension\par Prednisone 20 mg daily started 11/20/19 with resolution of symptoms\par RESOLVED\par \par 7) Cardiac\par HTN - continue metoprolol succinate 25mg daily\par Cardiomyopathy - continue Lasix 20 mg every other day for suspected fluid retention. F/U cardiologist in June 2020.\par \par 8) Other\par BPH - continue finasteride 5mg daily and tamsulosin 0.4mg daily \par Hypomagnesemia - likely 2/2 FK, continue MgOx 400mg tid\par Anxiety/Depression - continue Zoloft 50mg daily\par Insomnia - continue PRN Xanax 0.5-1mg qhs \par \par 9) Plan/Dispo\par Pt educated regarding plan of care, all questions/concerns addressed\par Instructed to contact our office immediately with fever or new/worsening yesterday\par F/u with Dr. Gaytan on 5/28/20

## 2020-05-22 NOTE — PHYSICAL EXAM
[Ambulatory and capable of all self care but unable to carry out any work activities] : Status 2- Ambulatory and capable of all self care but unable to carry out any work activities. Up and about more than 50% of waking hours [Normal] : affect appropriate [de-identified] : +BS; abdomen soft, non-distended, non-tender [de-identified] : LLE edema from below knee down to foot  [de-identified] : very faint erythema of upper back and upper chest

## 2020-05-22 NOTE — HISTORY OF PRESENT ILLNESS
[de-identified] : Mr. Delong is a 60 y/o male, with a previously diagnosed acute myeloid leukemia. A bone marrow biopsy from 7/3 revealed Acute myeloid leukemia (AML). FISH - report shows a population of aberrant myeloid blasts. He is currently being treated by Dr. Coelho for high risk AML with HIDAC consolidation chemotherapy. He was referred by Dr. Coelho for an initial consultation of a Haplo- transplant. \par \par The patient has a history of multiple blood clots - factor V Leiden runs in the family. He was diagnosed with sleep apnea, but refuses to use CPAP. He is a former smoker, 40 years. He also has a past medical history of PE, HTN, cardiomyopathy. He recently underwent an amputation of the right first toe due to an infection.\par \par S/p hospitalization at [facility] 10/3/19 - 11/1/19 for haplo (son) SCT. \par Upon admission, a TLC was placed in IR. Mr. Delong received IV fluid hydration, pain management, nutritional support, anxiolysis, antiemetics, and antiviral,  antifungal, antibacterial, GI, PCP and VOD prophylaxis. When his ANC dropped  below 1000, he was started on prophylactic Cefepime. Labs were monitored on a daily basis and he received transfusional support and electrolyte repletion as needed. \par \par While in patient, Mr. Delong was continued on his Voriconazole for history of aspergillosis. \par \par S/p hospitalization at [facility] 10/3/19 - 11/1/19 for haplo (son) SCT. \par Upon admission, a TLC was placed in IR. Mr. Delong received IV fluid hydration, pain management, nutritional support, anxiolysis, antiemetics, and antiviral, antifungal, antibacterial, GI, PCP and VOD prophylaxis. When his ANC dropped below 1000, he was started on prophylactic Cefepime. Labs were monitored on a daily basis and he received transfusional support and electrolyte repletion as needed. \par \par While in patient, Mr. Delong was continued on his Voriconazole for history of aspergillosis. \par \par During admission, Mr. Delong had pancytopenia related to the high dose chemotherapy prep regimen. He also experienced neutropenic fevers. When he became febrile, blood and urine cultures were sent and a CXR was completed which were unremarkable. \par \par On 10/9/2019, following premedications, pt received 250 ml of allogeneic, related, haplo, fresh, mobilized HPC apheresis over 30-60 minutes. \par Cell counts as follows: \par \par Total MNCs (x10^8/kg) = 5.17 \par CD 34 cells (x10^6/kg) = 7.34 \par CD 3 Cells (x 10^7/kg) = 19.81 \par Cell viability (%) = 100 \par \par Pt tolerated infusion without any adverse reaction or complications. \par \par Engraftment was noted on 10/28/2019. Daily Zarxio was discontinued, as was Cefepime given negative cultures. Mr. Delong was discharged home to f/u at the [facility].  [de-identified] : On 5/22/20 visit, day +226 of SCT today. Overall feeling OK. Ongoing faint pruritic erythema of chest and upper back, continues to use triamcinolone cream bid. Ongoing SOB on exertion is stable. Reports good PO intake/hydration. Occasional cough is stable. Reports LLE edema is somewhat improved, continues to use Lasix 20mg every other day. Compliant with post transplant meds and restrictions. Denies fever, chills, headache, dizziness, blurred vision, mucositis/odynophagia, chest pain, nausea/vomiting, diarrhea/constipation, abdominal pain, dysuria, bleeding, pain\par

## 2020-05-23 LAB — TACROLIMUS SERPL-MCNC: 6.2 NG/ML

## 2020-05-27 LAB
CMV DNA SPEC QL NAA+PROBE: NOT DETECTED
CMVPCR LOG: NOT DETECTED LOG10IU/ML
ENGRAFTMNET-POST: NORMAL

## 2020-05-28 ENCOUNTER — RESULT REVIEW (OUTPATIENT)
Age: 60
End: 2020-05-28

## 2020-05-28 ENCOUNTER — APPOINTMENT (OUTPATIENT)
Dept: HEMATOLOGY ONCOLOGY | Facility: CLINIC | Age: 60
End: 2020-05-28
Payer: COMMERCIAL

## 2020-05-28 VITALS
DIASTOLIC BLOOD PRESSURE: 69 MMHG | BODY MASS INDEX: 27.66 KG/M2 | HEART RATE: 89 BPM | WEIGHT: 203.91 LBS | TEMPERATURE: 97.98 F | SYSTOLIC BLOOD PRESSURE: 105 MMHG | RESPIRATION RATE: 16 BRPM | OXYGEN SATURATION: 93 %

## 2020-05-28 LAB
BASOPHILS # BLD AUTO: 0.02 K/UL — SIGNIFICANT CHANGE UP (ref 0–0.2)
BASOPHILS NFR BLD AUTO: 0.2 % — SIGNIFICANT CHANGE UP (ref 0–2)
EOSINOPHIL # BLD AUTO: 0.45 K/UL — SIGNIFICANT CHANGE UP (ref 0–0.5)
EOSINOPHIL NFR BLD AUTO: 4.3 % — SIGNIFICANT CHANGE UP (ref 0–6)
HCT VFR BLD CALC: 29.8 % — LOW (ref 39–50)
HGB BLD-MCNC: 9.4 G/DL — LOW (ref 13–17)
IMM GRANULOCYTES NFR BLD AUTO: 0.3 % — SIGNIFICANT CHANGE UP (ref 0–1.5)
LYMPHOCYTES # BLD AUTO: 5.72 K/UL — HIGH (ref 1–3.3)
LYMPHOCYTES # BLD AUTO: 54.6 % — HIGH (ref 13–44)
MCHC RBC-ENTMCNC: 31.5 GM/DL — LOW (ref 32–36)
MCHC RBC-ENTMCNC: 37.8 PG — HIGH (ref 27–34)
MCV RBC AUTO: 119.7 FL — HIGH (ref 80–100)
MONOCYTES # BLD AUTO: 0.8 K/UL — SIGNIFICANT CHANGE UP (ref 0–0.9)
MONOCYTES NFR BLD AUTO: 7.6 % — SIGNIFICANT CHANGE UP (ref 2–14)
NEUTROPHILS # BLD AUTO: 3.46 K/UL — SIGNIFICANT CHANGE UP (ref 1.8–7.4)
NEUTROPHILS NFR BLD AUTO: 33 % — LOW (ref 43–77)
NRBC # BLD: 0 /100 WBCS — SIGNIFICANT CHANGE UP (ref 0–0)
PLATELET # BLD AUTO: 42 K/UL — LOW (ref 150–400)
RBC # BLD: 2.49 M/UL — LOW (ref 4.2–5.8)
RBC # FLD: 17.2 % — HIGH (ref 10.3–14.5)
WBC # BLD: 10.48 K/UL — SIGNIFICANT CHANGE UP (ref 3.8–10.5)
WBC # FLD AUTO: 10.48 K/UL — SIGNIFICANT CHANGE UP (ref 3.8–10.5)

## 2020-05-28 PROCEDURE — 99215 OFFICE O/P EST HI 40 MIN: CPT

## 2020-05-29 ENCOUNTER — RX RENEWAL (OUTPATIENT)
Age: 60
End: 2020-05-29

## 2020-05-29 LAB
ALBUMIN SERPL ELPH-MCNC: 4.1 G/DL
ALP BLD-CCNC: 79 U/L
ALT SERPL-CCNC: 11 U/L
ANION GAP SERPL CALC-SCNC: 13 MMOL/L
AST SERPL-CCNC: 13 U/L
BILIRUB SERPL-MCNC: 0.3 MG/DL
BUN SERPL-MCNC: 19 MG/DL
CALCIUM SERPL-MCNC: 9.1 MG/DL
CHLORIDE SERPL-SCNC: 105 MMOL/L
CMV DNA SPEC QL NAA+PROBE: NOT DETECTED
CMVPCR LOG: NOT DETECTED LOG10IU/ML
CO2 SERPL-SCNC: 23 MMOL/L
CREAT SERPL-MCNC: 1.35 MG/DL
GLUCOSE SERPL-MCNC: 113 MG/DL
LDH SERPL-CCNC: 215 U/L
MAGNESIUM SERPL-MCNC: 2.2 MG/DL
POTASSIUM SERPL-SCNC: 4.8 MMOL/L
PROT SERPL-MCNC: 5.8 G/DL
SODIUM SERPL-SCNC: 141 MMOL/L
TACROLIMUS SERPL-MCNC: 9.4 NG/ML

## 2020-05-29 NOTE — HISTORY OF PRESENT ILLNESS
[de-identified] : Mr. Delong is a 58 y/o male, with a previously diagnosed acute myeloid leukemia. A bone marrow biopsy from 7/3 revealed Acute myeloid leukemia (AML). FISH - report shows a population of aberrant myeloid blasts. He is currently being treated by Dr. Coelho for high risk AML with HIDAC consolidation chemotherapy. He was referred by Dr. Coelho for an initial consultation of a Haplo- transplant. \par \par The patient has a history of multiple blood clots - factor V Leiden runs in the family. He was diagnosed with sleep apnea, but refuses to use CPAP. He is a former smoker, 40 years. He also has a past medical history of PE, HTN, cardiomyopathy. He recently underwent an amputation of the right first toe due to an infection.\par \par S/p hospitalization at [facility] 10/3/19 - 11/1/19 for haplo (son) SCT. \par Upon admission, a TLC was placed in IR. Mr. Delong received IV fluid hydration, pain management, nutritional support, anxiolysis, antiemetics, and antiviral,  antifungal, antibacterial, GI, PCP and VOD prophylaxis. When his ANC dropped  below 1000, he was started on prophylactic Cefepime. Labs were monitored on a daily basis and he received transfusional support and electrolyte repletion as needed. \par \par While in patient, Mr. Delong was continued on his Voriconazole for history of aspergillosis. \par \par S/p hospitalization at [facility] 10/3/19 - 11/1/19 for haplo (son) SCT. \par Upon admission, a TLC was placed in IR. Mr. Delong received IV fluid hydration, pain management, nutritional support, anxiolysis, antiemetics, and antiviral, antifungal, antibacterial, GI, PCP and VOD prophylaxis. When his ANC dropped below 1000, he was started on prophylactic Cefepime. Labs were monitored on a daily basis and he received transfusional support and electrolyte repletion as needed. \par \par While in patient, Mr. Delong was continued on his Voriconazole for history of aspergillosis. \par \par During admission, Mr. Delong had pancytopenia related to the high dose chemotherapy prep regimen. He also experienced neutropenic fevers. When he became febrile, blood and urine cultures were sent and a CXR was completed which were unremarkable. \par \par On 10/9/2019, following premedications, pt received 250 ml of allogeneic, related, haplo, fresh, mobilized HPC apheresis over 30-60 minutes. \par Cell counts as follows: \par \par Total MNCs (x10^8/kg) = 5.17 \par CD 34 cells (x10^6/kg) = 7.34 \par CD 3 Cells (x 10^7/kg) = 19.81 \par Cell viability (%) = 100 \par \par Pt tolerated infusion without any adverse reaction or complications. \par \par Engraftment was noted on 10/28/2019. Daily Zarxio was discontinued, as was Cefepime given negative cultures. Mr. Delong was discharged home to f/u at the [facility].  [de-identified] : S/P haplo transplant from son.... Overall feeling OK. Resolved faint pruritic erythema of chest and upper back, continues to use triamcinolone cream prn for puritis.... Ongoing SOB on exertion is stable. Reports good PO intake/hydration. Occasional cough is stable. Reports LLE edema is somewhat improved, continues to use Lasix 20mg every other day. Compliant with post transplant meds and restrictions. Denies fever, chills, headache, dizziness, blurred vision, mucositis/odynophagia, chest pain, nausea/vomiting, diarrhea/constipation, abdominal pain, dysuria, bleeding, pain\par Was hospitalized earlier this spring with pneumonia and CHF

## 2020-05-29 NOTE — ASSESSMENT
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 Post transplant BMbx pending \par \par 2) Heme\par ABO compatible transplant: pt and donor are both A pos\par Ongoing anemia and thrombocytopenia, poss in setting of recent infection vs poor graft function \par    5/22/20:   WBC 12.87   ANC 5.13   Hgb 9.2   PLT 55\par Continue multivitamin and folic acid daily\par \par Hx of recurrent DVT/PE\par Factor V Leiden reportedly runs in the family \par 11/21/19 Doppler US of RUE negative for DVT\par Xarelto on hold while pancytopenic, to resume when PLT consistently >100K\par LE edema poss r/t prednisone, consider Doppler US if edema persists - reportedly improved on 5/22/20 \par Continue Lasix 20 mg every other day\par \par 3) ID\par Continue ppx:\par - Acyclovir 400mg bid \par - Mepron 750mg bid\par - Voriconazole 200mg bid - hx of aspergillosis \par Post transplant crowd and dietary restrictions reviewed - in light of covid-19, CDC recs reviewed and strict restrictions reinforced \par \par CMV viremia\par CMV PCR peaked at 8,581 on 11/20/19\par Valcyte 450mg BID started 11/21/19. Decreased to QD as of 12/17/19. Held as of 2/14/20 for ongoing thrombocytopenia \par 5/8/20 CMV PCR not detected\par 5/14/20 CMV PCR not detected\par Continue to monitor PCR at ever visit \par \par 4) GVHD\par Skin 0  Liver 0   GI 0 - overall grade 0\par No signs of chronic GVHD at this time\par Off MMF as of 11/22/19 \par On  FK 0.5 mg daily - decrease to QOD\par Prednisone decreased to 5mg daily as of 5/22/20 for drop in chimerism\par Reviewed signs/symptoms of GVHD (i.e. rash, mucositis, nausea/vomiting, diarrhea)\par \par aGVHD of skin \par Maculopapular pruritic rash of face, neck, and upper chest (BSA 19%) noted 1/21/20 in setting of tapering prednisone (for FTT) - max skin stage 1, overall grade 1\par 1/21/20: rash on face, neck, and chest (BSA 19%); skin 1, overall grade 1; prednisone increased to 10mg daily\par 2/14/20: rash on neck, chest, and bilateral upper arms (BSA 18%); skin 1, overall grade 1: prednisone increased to 20mg daily\par 2/21/20: rash on neck and chest (10% BSA); skin 1, overall grade 1. Continue prednisone 20mg daily\par 3/12/20: rash on neck and chest (10% BSA); skin 1, overall grade 1. Continue prednisone 15mg daily  \par 3/19/20: rash on neck and chest (10% BSA); skin 1, overall grade 1. Continue prednisone 15 mg daily\par 4/2/20: rash on neck and chest (10% BSA); skin 1, overall grade 1. Continue prednisone 15 mg daily\par 4/9/20: rash resolved, prednisone decreased to 10mg daily\par 4/17/20: rash on neck and chest (10% BSA); skin 1, overall grade 1. Continue prednisone 10mg daily and reinforced compliance with triamcinolone cream bid \par 5/8/20: waxing and waning pruritic erythema of upper back, unsure if GVHD. Continue prednisone 10mg daily at this time\par 5/14/20: mild erythema of neck and upper back ( 5% BSA); skin 1, overall grade 1. Continue prednisone 10 mg daily\par 5/22/20: faint pruritic erythema of upper chest and upper back (9% BSA); skin 1, overall grade 1. Prednisone decreased 5mg daily for drop in chimerism \par Continue triamcinolone 0.1% cream bid PRN rash \par Continue to monitor closely \par \par 5) GI\par Continue ppx:\par - Protonix 40 mg daily\par - Ursodiol 300 mg bid\par PRN Zofran and Reglan for nausea/vomiting\par \par 6) Failure to thrive\par Poor PO intake/hydration with resulting hypotension\par Prednisone 20 mg daily started 11/20/19 with resolution of symptoms\par RESOLVED\par \par 7) Cardiac\par HTN - continue metoprolol succinate 25mg daily\par Cardiomyopathy - continue Lasix 20 mg every other day for suspected fluid retention. F/U cardiologist in June 2020.\par \par 8) Other\par BPH - continue finasteride 5mg daily and tamsulosin 0.4mg daily \par Hypomagnesemia - likely 2/2 FK, continue MgOx 400mg tid\par Anxiety/Depression - continue Zoloft 50mg daily\par Insomnia - continue PRN Xanax 0.5-1mg qhs \par \par 9) Plan/Dispo\par Pt educated regarding plan of care, all questions/concerns addressed\par Instructed to contact our office immediately with fever or new/worsening yesterday\par F/u in 1 week

## 2020-05-29 NOTE — PHYSICAL EXAM
[Ambulatory and capable of all self care but unable to carry out any work activities] : Status 2- Ambulatory and capable of all self care but unable to carry out any work activities. Up and about more than 50% of waking hours [Normal] : affect appropriate [de-identified] : LLE edema from below knee down to foot ..L > R [de-identified] : +BS; abdomen soft, non-distended, non-tender [de-identified] : very faint erythema of upper back and upper chest

## 2020-06-01 ENCOUNTER — APPOINTMENT (OUTPATIENT)
Dept: CARDIOLOGY | Facility: CLINIC | Age: 60
End: 2020-06-01
Payer: COMMERCIAL

## 2020-06-01 ENCOUNTER — NON-APPOINTMENT (OUTPATIENT)
Age: 60
End: 2020-06-01

## 2020-06-01 VITALS
BODY MASS INDEX: 27.77 KG/M2 | HEIGHT: 72 IN | HEART RATE: 87 BPM | WEIGHT: 205 LBS | OXYGEN SATURATION: 90 % | SYSTOLIC BLOOD PRESSURE: 115 MMHG | DIASTOLIC BLOOD PRESSURE: 66 MMHG

## 2020-06-01 VITALS — DIASTOLIC BLOOD PRESSURE: 80 MMHG | SYSTOLIC BLOOD PRESSURE: 148 MMHG

## 2020-06-01 PROCEDURE — 93000 ELECTROCARDIOGRAM COMPLETE: CPT

## 2020-06-01 PROCEDURE — 99214 OFFICE O/P EST MOD 30 MIN: CPT

## 2020-06-02 LAB — ENGRAFTMNET-POST: NORMAL

## 2020-06-05 ENCOUNTER — RESULT REVIEW (OUTPATIENT)
Age: 60
End: 2020-06-05

## 2020-06-05 ENCOUNTER — APPOINTMENT (OUTPATIENT)
Dept: HEMATOLOGY ONCOLOGY | Facility: CLINIC | Age: 60
End: 2020-06-05
Payer: COMMERCIAL

## 2020-06-05 VITALS
OXYGEN SATURATION: 94 % | SYSTOLIC BLOOD PRESSURE: 120 MMHG | TEMPERATURE: 97.8 F | WEIGHT: 200.84 LBS | BODY MASS INDEX: 27.24 KG/M2 | RESPIRATION RATE: 17 BRPM | DIASTOLIC BLOOD PRESSURE: 77 MMHG | HEART RATE: 99 BPM

## 2020-06-05 LAB
BASOPHILS # BLD AUTO: 0.02 K/UL — SIGNIFICANT CHANGE UP (ref 0–0.2)
BASOPHILS NFR BLD AUTO: 0.2 % — SIGNIFICANT CHANGE UP (ref 0–2)
EOSINOPHIL # BLD AUTO: 0.27 K/UL — SIGNIFICANT CHANGE UP (ref 0–0.5)
EOSINOPHIL NFR BLD AUTO: 2.3 % — SIGNIFICANT CHANGE UP (ref 0–6)
HCT VFR BLD CALC: 32.8 % — LOW (ref 39–50)
HGB BLD-MCNC: 10 G/DL — LOW (ref 13–17)
IMM GRANULOCYTES NFR BLD AUTO: 0.4 % — SIGNIFICANT CHANGE UP (ref 0–1.5)
LYMPHOCYTES # BLD AUTO: 45.5 % — HIGH (ref 13–44)
LYMPHOCYTES # BLD AUTO: 5.27 K/UL — HIGH (ref 1–3.3)
MCHC RBC-ENTMCNC: 30.5 GM/DL — LOW (ref 32–36)
MCHC RBC-ENTMCNC: 36.2 PG — HIGH (ref 27–34)
MCV RBC AUTO: 118.8 FL — HIGH (ref 80–100)
MONOCYTES # BLD AUTO: 0.65 K/UL — SIGNIFICANT CHANGE UP (ref 0–0.9)
MONOCYTES NFR BLD AUTO: 5.6 % — SIGNIFICANT CHANGE UP (ref 2–14)
NEUTROPHILS # BLD AUTO: 5.32 K/UL — SIGNIFICANT CHANGE UP (ref 1.8–7.4)
NEUTROPHILS NFR BLD AUTO: 46 % — SIGNIFICANT CHANGE UP (ref 43–77)
NRBC # BLD: 0 /100 WBCS — SIGNIFICANT CHANGE UP (ref 0–0)
PLATELET # BLD AUTO: 52 K/UL — LOW (ref 150–400)
RBC # BLD: 2.76 M/UL — LOW (ref 4.2–5.8)
RBC # FLD: 17.1 % — HIGH (ref 10.3–14.5)
WBC # BLD: 11.58 K/UL — HIGH (ref 3.8–10.5)
WBC # FLD AUTO: 11.58 K/UL — HIGH (ref 3.8–10.5)

## 2020-06-05 PROCEDURE — 99214 OFFICE O/P EST MOD 30 MIN: CPT

## 2020-06-07 NOTE — PHYSICAL EXAM
[Ambulatory and capable of all self care but unable to carry out any work activities] : Status 2- Ambulatory and capable of all self care but unable to carry out any work activities. Up and about more than 50% of waking hours [Normal] : affect appropriate [de-identified] : +1 BLE edema L>R [de-identified] : +BS; abdomen soft, non-distended, non-tender [de-identified] : very faint erythema of upper back and upper chest

## 2020-06-07 NOTE — HISTORY OF PRESENT ILLNESS
[de-identified] : Mr. Delong is a 60 y/o male, with a previously diagnosed acute myeloid leukemia. A bone marrow biopsy from 7/3 revealed Acute myeloid leukemia (AML). FISH - report shows a population of aberrant myeloid blasts. He is currently being treated by Dr. Coelho for high risk AML with HIDAC consolidation chemotherapy. He was referred by Dr. Coelho for an initial consultation of a Haplo- transplant. \par \par The patient has a history of multiple blood clots - factor V Leiden runs in the family. He was diagnosed with sleep apnea, but refuses to use CPAP. He is a former smoker, 40 years. He also has a past medical history of PE, HTN, cardiomyopathy. He recently underwent an amputation of the right first toe due to an infection.\par \par S/p hospitalization at [facility]MT[facility] 10/3/19 - 11/1/19 for haplo (son) SCT. \par Upon admission, a TLC was placed in IR. Mr. Delong received IV fluid hydration, pain management, nutritional support, anxiolysis, antiemetics, and antiviral,  antifungal, antibacterial, GI, PCP and VOD prophylaxis. When his ANC dropped  below 1000, he was started on prophylactic Cefepime. Labs were monitored on a daily basis and he received transfusional support and electrolyte repletion as needed. \par \par While in patient, Mr. Delong was continued on his Voriconazole for history of aspergillosis. \par \par S/p hospitalization at [facility] 10/3/19 - 11/1/19 for haplo (son) SCT. \par Upon admission, a TLC was placed in IR. Mr. Delong received IV fluid hydration, pain management, nutritional support, anxiolysis, antiemetics, and antiviral, antifungal, antibacterial, GI, PCP and VOD prophylaxis. When his ANC dropped below 1000, he was started on prophylactic Cefepime. Labs were monitored on a daily basis and he received transfusional support and electrolyte repletion as needed. \par \par While in patient, Mr. Delong was continued on his Voriconazole for history of aspergillosis. \par \par During admission, Mr. Delong had pancytopenia related to the high dose chemotherapy prep regimen. He also experienced neutropenic fevers. When he became febrile, blood and urine cultures were sent and a CXR was completed which were unremarkable. \par \par On 10/9/2019, following premedications, pt received 250 ml of allogeneic, related, haplo, fresh, mobilized HPC apheresis over 30-60 minutes. \par Cell counts as follows: \par \par Total MNCs (x10^8/kg) = 5.17 \par CD 34 cells (x10^6/kg) = 7.34 \par CD 3 Cells (x 10^7/kg) = 19.81 \par Cell viability (%) = 100 \par \par Pt tolerated infusion without any adverse reaction or complications. \par \par Engraftment was noted on 10/28/2019. Daily Zarxio was discontinued, as was Cefepime given negative cultures. Mr. Delong was discharged home to f/u at the [facility].  [de-identified] : On 6/5/20 visit, day +240 of SCT today. Overall feeling OK. Reports adequate PO intake. Stable SOB, usually worse in AMs and then improves throughout the day. Had diarrhea last week but has since resolved. Mild erythema and pruritus of chest and upper back. Stable BLE edema. Compliant with post transplant meds and restrictions. Currently taking FK 0.5mg every other day which he did not take prior to lab draw today. Also taking prednisone 5mg daily. Denies fever, chills, headache, dizziness, blurred vision, mucositis/odynophagia, chest pain, cough, nausea/vomiting, diarrhea/constipation, abdominal pain, dysuria, bleeding, pain\par

## 2020-06-07 NOTE — ASSESSMENT
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 with the following comorbidities being managed:\par \par 1) AML\par S/p haplo (son) PSCT on 10/9/19\par 4/9/20 chimerism = 99% donor (CD33 = 97% and CD3 = 100% donor)\par 5/8/20 chimerism = 93.7% donor\par 5/14/20 chimerism = 91.7% donor (CD33 = 76.3% and CD3 = 100% donor)\par 5/28/20 chimerism = 85% donor\par Post transplant BMBx pending, will schedule for June 2020\par Current disease status: remains VINCE at this time based on today's peripheral blood work. Post transplant BMbx pending \par \par 2) Heme\par ABO compatible transplant: pt and donor are both A pos\par Ongoing anemia and thrombocytopenia, poss in setting of recent infection vs poor graft function \par    6/5/20:   WBC 11.58   ANC 5.32   Hgb 10   PLT 52\par Continue multivitamin and folic acid daily\par \par Hx of recurrent DVT/PE\par Factor V Leiden reportedly runs in the family \par 11/21/19 Doppler US of RUE negative for DVT\par Xarelto on hold while pancytopenic, to resume when PLT consistently >100K\par LE edema poss r/t prednisone, consider Doppler US if edema persists - reportedly improved on 5/22/20 \par Continue Lasix 20 mg daily \par \par 3) ID\par Continue ppx:\par - Acyclovir 400mg bid \par - Mepron 750mg bid\par - Voriconazole 200mg bid - hx of aspergillosis \par Post transplant crowd and dietary restrictions reviewed - in light of covid-19, CDC recs reviewed and strict restrictions reinforced \par \par CMV viremia\par CMV PCR peaked at 8,581 on 11/20/19\par Valcyte 450mg BID started 11/21/19. Decreased to QD as of 12/17/19. Held as of 2/14/20 for ongoing thrombocytopenia \par 5/8/20 CMV PCR not detected\par 5/14/20 CMV PCR not detected\par 5/28/20 CMV PCR not detected\par Continue to monitor PCR at ever visit \par \par 4) GVHD\par Skin 0  Liver 0   GI 0 - overall grade 0\par No signs of chronic GVHD at this time\par \par Off MMF as of 11/22/19 \par Decrease FK to 0.5mg daily on Mon/Thurs x 1wk as of 6/5/20 and then d/c d/t dropping chimerism\par Decrease prednisone to 5mg every other day as of 6/5/20 and then d/c d/t dropping chimerism \par Reviewed signs/symptoms of GVHD (i.e. rash, mucositis, nausea/vomiting, diarrhea)\par \par aGVHD of skin \par Maculopapular pruritic rash of face, neck, and upper chest (BSA 19%) noted 1/21/20 in setting of tapering prednisone (for FTT) - max skin stage 1, overall grade 1\par 1/21/20: rash on face, neck, and chest (BSA 19%); skin 1, overall grade 1; prednisone increased to 10mg daily\par 2/14/20: rash on neck, chest, and bilateral upper arms (BSA 18%); skin 1, overall grade 1: prednisone increased to 20mg daily\par 2/21/20: rash on neck and chest (10% BSA); skin 1, overall grade 1. Continue prednisone 20mg daily\par 3/12/20: rash on neck and chest (10% BSA); skin 1, overall grade 1. Continue prednisone 15mg daily  \par 3/19/20: rash on neck and chest (10% BSA); skin 1, overall grade 1. Continue prednisone 15 mg daily\par 4/2/20: rash on neck and chest (10% BSA); skin 1, overall grade 1. Continue prednisone 15 mg daily\par 4/9/20: rash resolved, prednisone decreased to 10mg daily\par 4/17/20: rash on neck and chest (10% BSA); skin 1, overall grade 1. Continue prednisone 10mg daily and reinforced compliance with triamcinolone cream bid \par 5/8/20: waxing and waning pruritic erythema of upper back, unsure if GVHD. Continue prednisone 10mg daily at this time\par 5/14/20: mild erythema of neck and upper back ( 5% BSA); skin 1, overall grade 1. Continue prednisone 10 mg daily\par 5/22/20: faint pruritic erythema of upper chest and upper back (9% BSA); skin 1, overall grade 1. Prednisone decreased 5mg daily for drop in chimerism \par 6/5/20: monitor very faint erythema of chest and upper back\par Continue triamcinolone 0.1% cream bid PRN rash \par Continue to monitor closely \par \par 5) GI\par Continue ppx:\par - Protonix 40 mg daily\par - Ursodiol 300 mg bid\par PRN Zofran and Reglan for nausea/vomiting\par \par 6) Failure to thrive\par Poor PO intake/hydration with resulting hypotension\par Prednisone 20 mg daily started 11/20/19 with resolution of symptoms\par RESOLVED\par \par 7) Cardiac\par HTN - continue metoprolol succinate 25mg daily\par Cardiomyopathy - continue Lasix 20 mg daily for suspected fluid retention. Continue Coreg 3.125mg bid as prescribed by cardiology \par \par \par 8) Other\par BPH - continue finasteride 5mg daily and tamsulosin 0.4mg daily \par Hypomagnesemia - likely 2/2 FK, continue MgOx 400mg tid\par Anxiety/Depression - continue Zoloft 50mg daily\par Insomnia - continue PRN Xanax 0.5-1mg qhs \par \par 9) Plan/Dispo\par Pt educated regarding plan of care, all questions/concerns addressed\par Instructed to contact our office immediately with fever or new/worsening yesterday\par F/u in 2wks with Dr. Gaytan on 6/18/20

## 2020-06-08 LAB
ALBUMIN SERPL ELPH-MCNC: 4.4 G/DL
ALP BLD-CCNC: 84 U/L
ALT SERPL-CCNC: 8 U/L
ANION GAP SERPL CALC-SCNC: 11 MMOL/L
AST SERPL-CCNC: 14 U/L
BILIRUB SERPL-MCNC: 0.3 MG/DL
BUN SERPL-MCNC: 17 MG/DL
CALCIUM SERPL-MCNC: 9.5 MG/DL
CHLORIDE SERPL-SCNC: 101 MMOL/L
CO2 SERPL-SCNC: 24 MMOL/L
CREAT SERPL-MCNC: 1.38 MG/DL
GLUCOSE SERPL-MCNC: 144 MG/DL
LDH SERPL-CCNC: 205 U/L
MAGNESIUM SERPL-MCNC: 1.8 MG/DL
POTASSIUM SERPL-SCNC: 4.8 MMOL/L
PROT SERPL-MCNC: 6.1 G/DL
SODIUM SERPL-SCNC: 137 MMOL/L
TACROLIMUS SERPL-MCNC: 4.2 NG/ML

## 2020-06-09 LAB
CMV DNA SPEC QL NAA+PROBE: NOT DETECTED
CMVPCR LOG: NOT DETECTED LOG10IU/ML

## 2020-06-10 LAB — ENGRAFTMNET-POST: NORMAL

## 2020-06-12 ENCOUNTER — APPOINTMENT (OUTPATIENT)
Dept: HEMATOLOGY ONCOLOGY | Facility: CLINIC | Age: 60
End: 2020-06-12

## 2020-06-15 ENCOUNTER — RX RENEWAL (OUTPATIENT)
Age: 60
End: 2020-06-15

## 2020-06-18 ENCOUNTER — APPOINTMENT (OUTPATIENT)
Dept: HEMATOLOGY ONCOLOGY | Facility: CLINIC | Age: 60
End: 2020-06-18

## 2020-06-19 ENCOUNTER — OUTPATIENT (OUTPATIENT)
Dept: OUTPATIENT SERVICES | Facility: HOSPITAL | Age: 60
LOS: 1 days | Discharge: HOME | End: 2020-06-19

## 2020-06-19 DIAGNOSIS — C92.00 ACUTE MYELOBLASTIC LEUKEMIA, NOT HAVING ACHIEVED REMISSION: ICD-10-CM

## 2020-06-22 ENCOUNTER — OUTPATIENT (OUTPATIENT)
Dept: OUTPATIENT SERVICES | Facility: HOSPITAL | Age: 60
LOS: 1 days | End: 2020-06-22
Payer: COMMERCIAL

## 2020-06-22 DIAGNOSIS — C92.00 ACUTE MYELOBLASTIC LEUKEMIA, NOT HAVING ACHIEVED REMISSION: ICD-10-CM

## 2020-06-23 ENCOUNTER — APPOINTMENT (OUTPATIENT)
Dept: HEMATOLOGY ONCOLOGY | Facility: CLINIC | Age: 60
End: 2020-06-23
Payer: COMMERCIAL

## 2020-06-23 ENCOUNTER — LABORATORY RESULT (OUTPATIENT)
Age: 60
End: 2020-06-23

## 2020-06-23 ENCOUNTER — RESULT REVIEW (OUTPATIENT)
Age: 60
End: 2020-06-23

## 2020-06-23 VITALS
TEMPERATURE: 98 F | RESPIRATION RATE: 18 BRPM | SYSTOLIC BLOOD PRESSURE: 122 MMHG | HEART RATE: 106 BPM | DIASTOLIC BLOOD PRESSURE: 81 MMHG | WEIGHT: 202.83 LBS | BODY MASS INDEX: 27.51 KG/M2 | OXYGEN SATURATION: 99 %

## 2020-06-23 LAB
BASOPHILS # BLD AUTO: 0.03 K/UL — SIGNIFICANT CHANGE UP (ref 0–0.2)
BASOPHILS NFR BLD AUTO: 0.3 % — SIGNIFICANT CHANGE UP (ref 0–2)
EOSINOPHIL # BLD AUTO: 0.54 K/UL — HIGH (ref 0–0.5)
EOSINOPHIL NFR BLD AUTO: 4.7 % — SIGNIFICANT CHANGE UP (ref 0–6)
HCT VFR BLD CALC: 32.2 % — LOW (ref 39–50)
HGB BLD-MCNC: 10.2 G/DL — LOW (ref 13–17)
IMM GRANULOCYTES NFR BLD AUTO: 0.5 % — SIGNIFICANT CHANGE UP (ref 0–1.5)
LYMPHOCYTES # BLD AUTO: 54.3 % — HIGH (ref 13–44)
LYMPHOCYTES # BLD AUTO: 6.26 K/UL — HIGH (ref 1–3.3)
MCHC RBC-ENTMCNC: 31.7 GM/DL — LOW (ref 32–36)
MCHC RBC-ENTMCNC: 35.5 PG — HIGH (ref 27–34)
MCV RBC AUTO: 112.2 FL — HIGH (ref 80–100)
MONOCYTES # BLD AUTO: 1.13 K/UL — HIGH (ref 0–0.9)
MONOCYTES NFR BLD AUTO: 9.8 % — SIGNIFICANT CHANGE UP (ref 2–14)
NEUTROPHILS # BLD AUTO: 3.5 K/UL — SIGNIFICANT CHANGE UP (ref 1.8–7.4)
NEUTROPHILS NFR BLD AUTO: 30.4 % — LOW (ref 43–77)
NRBC # BLD: 0 /100 WBCS — SIGNIFICANT CHANGE UP (ref 0–0)
PLATELET # BLD AUTO: 59 K/UL — LOW (ref 150–400)
RBC # BLD: 2.87 M/UL — LOW (ref 4.2–5.8)
RBC # FLD: 16 % — HIGH (ref 10.3–14.5)
WBC # BLD: 11.52 K/UL — HIGH (ref 3.8–10.5)
WBC # FLD AUTO: 11.52 K/UL — HIGH (ref 3.8–10.5)

## 2020-06-23 PROCEDURE — 99215 OFFICE O/P EST HI 40 MIN: CPT

## 2020-06-23 RX ORDER — PREDNISONE 5 MG/1
5 TABLET ORAL
Qty: 90 | Refills: 3 | Status: DISCONTINUED | COMMUNITY
Start: 2019-11-20 | End: 2020-06-23

## 2020-06-23 RX ORDER — TACROLIMUS 0.5 MG/1
0.5 CAPSULE ORAL
Qty: 30 | Refills: 2 | Status: DISCONTINUED | COMMUNITY
Start: 2020-01-17 | End: 2020-06-23

## 2020-06-23 RX ORDER — TACROLIMUS 1 MG/1
1 CAPSULE ORAL
Qty: 60 | Refills: 1 | Status: DISCONTINUED | COMMUNITY
Start: 2019-11-07 | End: 2020-06-23

## 2020-06-24 ENCOUNTER — APPOINTMENT (OUTPATIENT)
Dept: CARDIOLOGY | Facility: CLINIC | Age: 60
End: 2020-06-24
Payer: COMMERCIAL

## 2020-06-24 PROCEDURE — 93306 TTE W/DOPPLER COMPLETE: CPT

## 2020-06-25 ENCOUNTER — RESULT REVIEW (OUTPATIENT)
Age: 60
End: 2020-06-25

## 2020-06-25 ENCOUNTER — RX RENEWAL (OUTPATIENT)
Age: 60
End: 2020-06-25

## 2020-06-25 ENCOUNTER — APPOINTMENT (OUTPATIENT)
Dept: HEMATOLOGY ONCOLOGY | Facility: CLINIC | Age: 60
End: 2020-06-25
Payer: COMMERCIAL

## 2020-06-25 VITALS
TEMPERATURE: 98.3 F | BODY MASS INDEX: 27.21 KG/M2 | HEART RATE: 94 BPM | RESPIRATION RATE: 17 BRPM | SYSTOLIC BLOOD PRESSURE: 115 MMHG | DIASTOLIC BLOOD PRESSURE: 69 MMHG | WEIGHT: 200.62 LBS | OXYGEN SATURATION: 94 %

## 2020-06-25 PROCEDURE — 87205 SMEAR GRAM STAIN: CPT

## 2020-06-25 PROCEDURE — 88264 CHROMOSOME ANALYSIS 20-25: CPT

## 2020-06-25 PROCEDURE — 88305 TISSUE EXAM BY PATHOLOGIST: CPT

## 2020-06-25 PROCEDURE — 88360 TUMOR IMMUNOHISTOCHEM/MANUAL: CPT

## 2020-06-25 PROCEDURE — 88305 TISSUE EXAM BY PATHOLOGIST: CPT | Mod: 26

## 2020-06-25 PROCEDURE — 85097 BONE MARROW INTERPRETATION: CPT

## 2020-06-25 PROCEDURE — 88237 TISSUE CULTURE BONE MARROW: CPT

## 2020-06-25 PROCEDURE — 38222 DX BONE MARROW BX & ASPIR: CPT | Mod: RT

## 2020-06-25 PROCEDURE — 88313 SPECIAL STAINS GROUP 2: CPT

## 2020-06-25 PROCEDURE — 88184 FLOWCYTOMETRY/ TC 1 MARKER: CPT

## 2020-06-25 PROCEDURE — 88285 CHROMOSOME COUNT ADDITIONAL: CPT

## 2020-06-25 PROCEDURE — 88280 CHROMOSOME KARYOTYPE STUDY: CPT

## 2020-06-25 PROCEDURE — 88360 TUMOR IMMUNOHISTOCHEM/MANUAL: CPT | Mod: 26

## 2020-06-25 PROCEDURE — 88189 FLOWCYTOMETRY/READ 16 & >: CPT

## 2020-06-25 PROCEDURE — 88185 FLOWCYTOMETRY/TC ADD-ON: CPT

## 2020-06-25 PROCEDURE — 88313 SPECIAL STAINS GROUP 2: CPT | Mod: 26
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
## 2020-06-25 NOTE — ASSESSMENT
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 with the following comorbidities being managed:\par \par 1) AML\par S/p haplo (son) PSCT on 10/9/19\par 4/9/20 chimerism = 99% donor (CD33 = 97% and CD3 = 100% donor)\par 5/8/20 chimerism = 93.7% donor\par 5/14/20 chimerism = 91.7% donor (CD33 = 76.3% and CD3 = 100% donor)\par 5/28/20 chimerism = 85% donor\par 6/5/20 chimerism= 74.3% donor\par Current disease status: remains VINCE at this time based on today's peripheral blood work. Post transplant BMbx scheduled for 6/25/20\par \par 2) Heme\par ABO compatible transplant: pt and donor are both A pos\par Ongoing anemia and thrombocytopenia, poss in setting of recent infection vs poor graft function \par    6/23/20: WBC 11.52   ANC 3.50  Hgb 10.2   PLT 59\par Continue multivitamin and folic acid daily\par \par Hx of recurrent DVT/PE\par Factor V Leiden reportedly runs in the family \par 11/21/19 Doppler US of RUE negative for DVT\par Xarelto on hold while pancytopenic, to resume when PLT consistently >100K\par LE edema poss r/t prednisone, consider Doppler US if edema persists - reportedly improved on 5/22/20 \par Increase lasix to 40 mg daily on 6/23/20. Scheduled for an ECHO on 6/24/20. Echo ordered by cardiologist.\par \par 3) ID\par Continue ppx:\par - Acyclovir 400 mg bid \par - Mepron 750m g bid\par - Voriconazole 200 mg bid - hx of aspergillosis \par Post transplant crowd and dietary restrictions reviewed - in light of covid-19, [facility] recs reviewed and strict restrictions reinforced \par \par CMV viremia\par CMV PCR peaked at 8,581 on 11/20/19\par Valcyte 450mg BID started 11/21/19. Decreased to QD as of 12/17/19. Held as of 2/14/20 for ongoing thrombocytopenia \par 5/8/20 CMV PCR not detected\par 5/14/20 CMV PCR not detected\par 5/28/20 CMV PCR not detected\par 6/5/20 CMV PCR not detected\par Continue to monitor PCR at ever visit \par \par 4) GVHD\par Skin 0  Liver 0   GI 0 - overall grade 0\par No signs of acute or chronic GVHD at this time\par Off MMF as of 11/22/19 \par Tacrolimus discontinued on 6/15/20\par Prednisone discontinued on 6/15/20\par Reviewed signs/symptoms of GVHD (i.e. rash, mucositis, nausea/vomiting, diarrhea)\par \par aGVHD of skin \par Maculopapular pruritic rash of face, neck, and upper chest (BSA 19%) noted 1/21/20 in setting of tapering prednisone (for FTT) - max skin stage 1, overall grade 1\par 1/21/20: rash on face, neck, and chest (BSA 19%); skin 1, overall grade 1; prednisone increased to 10mg daily\par 2/14/20: rash on neck, chest, and bilateral upper arms (BSA 18%); skin 1, overall grade 1: prednisone increased to 20mg daily\par 2/21/20: rash on neck and chest (10% BSA); skin 1, overall grade 1. Continue prednisone 20mg daily\par 3/12/20: rash on neck and chest (10% BSA); skin 1, overall grade 1. Continue prednisone 15mg daily  \par 3/19/20: rash on neck and chest (10% BSA); skin 1, overall grade 1. Continue prednisone 15 mg daily\par 4/2/20: rash on neck and chest (10% BSA); skin 1, overall grade 1. Continue prednisone 15 mg daily\par 4/9/20: rash resolved, prednisone decreased to 10mg daily\par 4/17/20: rash on neck and chest (10% BSA); skin 1, overall grade 1. Continue prednisone 10mg daily and reinforced compliance with triamcinolone cream bid \par 5/8/20: waxing and waning pruritic erythema of upper back, unsure if GVHD. Continue prednisone 10mg daily at this time\par 5/14/20: mild erythema of neck and upper back ( 5% BSA); skin 1, overall grade 1. Continue prednisone 10 mg daily\par 5/22/20: faint pruritic erythema of upper chest and upper back (9% BSA); skin 1, overall grade 1. Prednisone decreased 5mg daily for drop in chimerism \par 6/5/20: monitor very faint erythema of chest and upper back. Continue triamcinolone 0.1% cream bid PRN rash \par Continue to monitor closely \par 6/23/20: Mild erythema of upper back and bilateral upper extremities.Continue triamcinolone 0.1% cream bid PRN rash \par Continue to monitor closely \par \par 5) GI\par Continue ppx:\par - Protonix 40 mg daily\par - Ursodiol 300 mg bid\par PRN Zofran and Reglan for nausea/vomiting\par \par 6) Failure to thrive\par Poor PO intake/hydration with resulting hypotension\par Prednisone 20 mg daily started 11/20/19 with resolution of symptoms\par RESOLVED\par \par 7) Cardiac\par HTN - continue metoprolol succinate 25mg daily\par Cardiomyopathy - increase Lasix to 40 mg daily on 6/23/20 for suspected fluid retention. \par Continue Coreg 3.125mg bid as prescribed by cardiology \par ECHO ordered by cardiologist and scheduled for 6/24/20\par \par 8) Other\par BPH - continue finasteride 5mg daily and tamsulosin 0.4mg daily \par Hypomagnesemia - likely 2/2 FK, continue MgOx 400mg tid\par Anxiety/Depression - continue Zoloft 50mg daily\par Insomnia - continue PRN Xanax 0.5-1mg qhs. Medication renewed today.\par \par 9) Plan/Dispo\par Pt educated regarding plan of care, all questions/concerns addressed\par Instructed to contact our office immediately with fever or new/worsening yesterday\par F/u in 1 week with YADIRA Arias- telehealth visit.\par Continue follow up appointments at the [facility] every two weeks

## 2020-06-25 NOTE — REASON FOR VISIT
[Bone Marrow Biopsy] : bone marrow biopsy [Bone Marrow Aspiration] : bone marrow aspiration [FreeTextEntry2] : 59 yo male s/p haplo son PBSCT on 10/9/2019, post transplant BMBX

## 2020-06-25 NOTE — PHYSICAL EXAM
[Ambulatory and capable of all self care but unable to carry out any work activities] : Status 2- Ambulatory and capable of all self care but unable to carry out any work activities. Up and about more than 50% of waking hours [Normal] : affect appropriate [de-identified] : +1 BLE edema L>R [de-identified] : +BS; abdomen soft, non-distended, non-tender [de-identified] : very faint erythema of upper back and bilateral upper extremities

## 2020-06-25 NOTE — PROCEDURE
[Bone Marrow Biopsy] : bone marrow biopsy [Bone Marrow Aspiration] : bone marrow aspiration  [Patient] : the patient [Patient identification verified] : patient identification verified [Procedure verified and consent obtained] : procedure verified and consent obtained [Correct positioning] : correct positioning [Prone] : prone [The right posterior iliac crest was prepped with betadine and draped, using sterile technique.] : The right posterior iliac crest was prepped with betadine and draped, using sterile technique. [Lidocaine was injected and into the periosteum overlying the site.] : Lidocaine was injected and into the periosteum overlying the site. [Aspirate] : aspirate [Cytogenetics] : cytogenetics [FISH] : FISH [Biopsy] : biopsy [Flow Cytometry] : flow cytometry [] : The patient was instructed to remove the bandage the following AM. The patient may bathe. Acetaminophen may be taken for discomfort, as per package directions.If there are any other problems, the patient was instructed to call the office. The patient verbalized understanding, and is aware of the office contact numbers. [FreeTextEntry1] : 61 yo male s/p haplo son PBSCT on 10/9/2019, post transplant BMBX [FreeTextEntry2] : CBC prior to procedure on 6/23/2020 ( pt refused CBC prior to BM since labs done 2 days ago)\par WBC 11.52\par Hgb  10.2 Hct 32.2\par Plt 59\par BM Bx and aspiration was performed by IGLESIA Lemus. 2 lavender + 2 green top tubes of BM aspirate and 1 container of BM core specimen sent to lab.\par \par

## 2020-06-25 NOTE — REVIEW OF SYSTEMS
[Negative] : Heme/Lymph [Palpitations] : no palpitations [Chest Pain] : no chest pain [Leg Claudication] : no intermittent leg claudication [Shortness Of Breath] : no shortness of breath [Lower Ext Edema] : lower extremity edema [Cough] : no cough [Wheezing] : no wheezing [SOB on Exertion] : shortness of breath during exertion [Skin Rash] : no skin rash [Skin Wound] : no skin wound [FreeTextEntry5] : Bilateral lower extremity edema. Left leg is more edematous than right leg. [de-identified] : Mild erythema of bilateral upper extremities and upper back

## 2020-06-26 ENCOUNTER — APPOINTMENT (OUTPATIENT)
Dept: HEMATOLOGY ONCOLOGY | Facility: CLINIC | Age: 60
End: 2020-06-26

## 2020-06-29 ENCOUNTER — RX RENEWAL (OUTPATIENT)
Age: 60
End: 2020-06-29

## 2020-06-29 LAB
HEMATOPATHOLOGY REPORT: SIGNIFICANT CHANGE UP
TM INTERPRETATION: SIGNIFICANT CHANGE UP

## 2020-07-02 ENCOUNTER — RESULT REVIEW (OUTPATIENT)
Age: 60
End: 2020-07-02

## 2020-07-02 ENCOUNTER — APPOINTMENT (OUTPATIENT)
Dept: ULTRASOUND IMAGING | Facility: IMAGING CENTER | Age: 60
End: 2020-07-02
Payer: COMMERCIAL

## 2020-07-02 ENCOUNTER — APPOINTMENT (OUTPATIENT)
Dept: HEMATOLOGY ONCOLOGY | Facility: CLINIC | Age: 60
End: 2020-07-02
Payer: COMMERCIAL

## 2020-07-02 ENCOUNTER — APPOINTMENT (OUTPATIENT)
Dept: HEMATOLOGY ONCOLOGY | Facility: CLINIC | Age: 60
End: 2020-07-02

## 2020-07-02 ENCOUNTER — OUTPATIENT (OUTPATIENT)
Dept: OUTPATIENT SERVICES | Facility: HOSPITAL | Age: 60
LOS: 1 days | End: 2020-07-02
Payer: COMMERCIAL

## 2020-07-02 VITALS
TEMPERATURE: 98.2 F | SYSTOLIC BLOOD PRESSURE: 107 MMHG | WEIGHT: 202.16 LBS | RESPIRATION RATE: 18 BRPM | OXYGEN SATURATION: 92 % | HEART RATE: 93 BPM | DIASTOLIC BLOOD PRESSURE: 69 MMHG | BODY MASS INDEX: 27.42 KG/M2

## 2020-07-02 DIAGNOSIS — C92.00 ACUTE MYELOBLASTIC LEUKEMIA, NOT HAVING ACHIEVED REMISSION: ICD-10-CM

## 2020-07-02 DIAGNOSIS — Z94.84 STEM CELLS TRANSPLANT STATUS: ICD-10-CM

## 2020-07-02 LAB
BASOPHILS # BLD AUTO: 0.05 K/UL — SIGNIFICANT CHANGE UP (ref 0–0.2)
BASOPHILS NFR BLD AUTO: 0.4 % — SIGNIFICANT CHANGE UP (ref 0–2)
EOSINOPHIL # BLD AUTO: 0.46 K/UL — SIGNIFICANT CHANGE UP (ref 0–0.5)
EOSINOPHIL NFR BLD AUTO: 3.4 % — SIGNIFICANT CHANGE UP (ref 0–6)
HCT VFR BLD CALC: 33.3 % — LOW (ref 39–50)
HGB BLD-MCNC: 10.4 G/DL — LOW (ref 13–17)
IMM GRANULOCYTES NFR BLD AUTO: 0.7 % — SIGNIFICANT CHANGE UP (ref 0–1.5)
LYMPHOCYTES # BLD AUTO: 59 % — HIGH (ref 13–44)
LYMPHOCYTES # BLD AUTO: 8.09 K/UL — HIGH (ref 1–3.3)
MCHC RBC-ENTMCNC: 31.2 GM/DL — LOW (ref 32–36)
MCHC RBC-ENTMCNC: 34.6 PG — HIGH (ref 27–34)
MCV RBC AUTO: 110.6 FL — HIGH (ref 80–100)
MONOCYTES # BLD AUTO: 1.23 K/UL — HIGH (ref 0–0.9)
MONOCYTES NFR BLD AUTO: 9 % — SIGNIFICANT CHANGE UP (ref 2–14)
NEUTROPHILS # BLD AUTO: 3.79 K/UL — SIGNIFICANT CHANGE UP (ref 1.8–7.4)
NEUTROPHILS NFR BLD AUTO: 27.5 % — LOW (ref 43–77)
NRBC # BLD: 0 /100 WBCS — SIGNIFICANT CHANGE UP (ref 0–0)
PLATELET # BLD AUTO: 79 K/UL — LOW (ref 150–400)
RBC # BLD: 3.01 M/UL — LOW (ref 4.2–5.8)
RBC # FLD: 16.1 % — HIGH (ref 10.3–14.5)
WBC # BLD: 13.71 K/UL — HIGH (ref 3.8–10.5)
WBC # FLD AUTO: 13.71 K/UL — HIGH (ref 3.8–10.5)

## 2020-07-02 PROCEDURE — 93970 EXTREMITY STUDY: CPT

## 2020-07-02 PROCEDURE — 99214 OFFICE O/P EST MOD 30 MIN: CPT

## 2020-07-02 PROCEDURE — 93970 EXTREMITY STUDY: CPT | Mod: 26

## 2020-07-03 LAB
ALBUMIN SERPL ELPH-MCNC: 4.3 G/DL
ALP BLD-CCNC: 101 U/L
ALT SERPL-CCNC: 9 U/L
ANION GAP SERPL CALC-SCNC: 13 MMOL/L
AST SERPL-CCNC: 19 U/L
BILIRUB SERPL-MCNC: 0.4 MG/DL
BUN SERPL-MCNC: 13 MG/DL
CALCIUM SERPL-MCNC: 9.3 MG/DL
CHLORIDE SERPL-SCNC: 103 MMOL/L
CO2 SERPL-SCNC: 25 MMOL/L
CREAT SERPL-MCNC: 1.17 MG/DL
GLUCOSE SERPL-MCNC: 113 MG/DL
LDH SERPL-CCNC: 233 U/L
MAGNESIUM SERPL-MCNC: 2.1 MG/DL
POTASSIUM SERPL-SCNC: 4.5 MMOL/L
PROT SERPL-MCNC: 6 G/DL
SODIUM SERPL-SCNC: 141 MMOL/L
TACROLIMUS SERPL-MCNC: <2 NG/ML

## 2020-07-06 LAB — CHROM ANALY OVERALL INTERP SPEC-IMP: SIGNIFICANT CHANGE UP

## 2020-07-06 NOTE — PHYSICAL EXAM
[Ambulatory and capable of all self care but unable to carry out any work activities] : Status 2- Ambulatory and capable of all self care but unable to carry out any work activities. Up and about more than 50% of waking hours [Normal] : grossly intact [de-identified] : +2 BLE edema L>R [de-identified] : pruritic erythematous rash of chest, BUEs, and upper back  [de-identified] : +BS; abdomen soft, non-distended, non-tender

## 2020-07-06 NOTE — ASSESSMENT
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 Post transplant BMbx scheduled for 6/25/20\par \par 2) Heme\par ABO compatible transplant: pt and donor are both A pos\par Ongoing anemia and thrombocytopenia, poss poor graft function \par    7/2/20:   WBC 13.71   ANC 3.79   Hgb 10.4   PLT 79\par Continue multivitamin and folic acid daily\par \par Hx of recurrent DVT/PE\par Factor V Leiden reportedly runs in the family \par 11/21/19 Doppler US of RUE negative for DVT\par Xarelto on hold while pancytopenic, to resume when PLT consistently >100K\par LE edema poss r/t prednisone, consider Doppler US if edema persists - reportedly improved on 5/22/20 \par Increase lasix to 40 mg daily on 6/23/20. Scheduled for an ECHO on 6/24/20. Echo ordered by cardiologist.\par Continue Lasix 60mg daily per cardiologist, f/u Doppler US of BLEs scheduled for 7/2/20 \par \par 3) ID\par Continue ppx:\par - Acyclovir 400 mg bid \par - Mepron 750m g bid\par - Voriconazole 200 mg bid - hx of aspergillosis \par Post transplant crowd and dietary restrictions reviewed - in light of covid-19, CDC recs reviewed and strict restrictions reinforced \par \par CMV viremia\par CMV PCR peaked at 8,581 on 11/20/19\par Valcyte 450mg BID started 11/21/19. Decreased to QD as of 12/17/19. Held as of 2/14/20 for ongoing thrombocytopenia \par 5/8/20 CMV PCR not detected\par 5/14/20 CMV PCR not detected\par 5/28/20 CMV PCR not detected\par 6/5/20 CMV PCR not detected\par 7/2/20 CMV PCR not detected\par Continue to monitor PCR at ever visit \par \par 4) GVHD\par Skin 0  Liver 0   GI 0 - overall grade 0\par No signs of acute or chronic GVHD at this time\par Off MMF as of 11/22/19 \par Tacrolimus discontinued on 6/15/20\par Prednisone discontinued on 6/15/20\par Reviewed signs/symptoms of GVHD (i.e. rash, mucositis, nausea/vomiting, diarrhea)\par \par aGVHD of skin \par Maculopapular pruritic rash of face, neck, and upper chest (BSA 19%) noted 1/21/20 in setting of tapering prednisone (for FTT) - max skin stage 1, overall grade 1\par 1/21/20: rash on face, neck, and chest (BSA 19%); skin 1, overall grade 1; prednisone increased to 10mg daily\par 2/14/20: rash on neck, chest, and bilateral upper arms (BSA 18%); skin 1, overall grade 1: prednisone increased to 20mg daily\par 2/21/20: rash on neck and chest (10% BSA); skin 1, overall grade 1. Continue prednisone 20mg daily\par 3/12/20: rash on neck and chest (10% BSA); skin 1, overall grade 1. Continue prednisone 15mg daily  \par 3/19/20: rash on neck and chest (10% BSA); skin 1, overall grade 1. Continue prednisone 15 mg daily\par 4/2/20: rash on neck and chest (10% BSA); skin 1, overall grade 1. Continue prednisone 15 mg daily\par 4/9/20: rash resolved, prednisone decreased to 10mg daily\par 4/17/20: rash on neck and chest (10% BSA); skin 1, overall grade 1. Continue prednisone 10mg daily and reinforced compliance with triamcinolone cream bid \par 5/8/20: waxing and waning pruritic erythema of upper back, unsure if GVHD. Continue prednisone 10mg daily at this time\par 5/14/20: mild erythema of neck and upper back ( 5% BSA); skin 1, overall grade 1. Continue prednisone 10 mg daily\par 5/22/20: faint pruritic erythema of upper chest and upper back (9% BSA); skin 1, overall grade 1. Prednisone decreased 5mg daily for drop in chimerism \par 6/5/20: monitor very faint erythema of chest and upper back. Continue triamcinolone 0.1% cream bid PRN rash \par Continue to monitor closely \par 6/23/20: Mild erythema of upper back and bilateral upper extremities.Continue triamcinolone 0.1% cream bid PRN rash \par 7/2/20: pruritic erythema of upper back, chest, and bilateral upper arms (36% BSA). Defer starting oral immunosuppression at this time given drop in chimerism. Start Claritin daily and Pepcid bid. Will attempt to attain insurance auth for Lidex 0.1% cream bid, continue to monitor closely\par \par 5) GI\par Continue ppx:\par - Protonix 40 mg daily\par - Ursodiol 300 mg bid\par PRN Zofran and Reglan for nausea/vomiting\par \par 6) Failure to thrive\par Poor PO intake/hydration with resulting hypotension\par Prednisone 20 mg daily started 11/20/19 with resolution of symptoms\par RESOLVED\par \par 7) Cardiac\par HTN - continue metoprolol succinate 25mg daily\par Cardiomyopathy - increase Lasix to 40 mg daily on 6/23/20 for suspected fluid retention. \par Continue Coreg 3.125mg bid as prescribed by cardiology \par ECHO ordered by cardiologist and scheduled for 6/24/20\par \par 8) Other\par BPH - continue finasteride 5mg daily and tamsulosin 0.4mg daily \par Hypomagnesemia - likely 2/2 FK, continue MgOx 400mg tid\par Anxiety/Depression - continue Zoloft 50mg daily\par Insomnia - continue PRN Xanax 0.5-1mg qhs. Medication renewed today.\par \par 9) Plan/Dispo\par Pt educated regarding plan of care, all questions/concerns addressed\par Instructed to contact our office immediately with fever or new/worsening yesterday\par F/u with NP on 7/8/20

## 2020-07-06 NOTE — HISTORY OF PRESENT ILLNESS
[de-identified] : Mr. Delong is a 60 y/o male, with a previously diagnosed acute myeloid leukemia. A bone marrow biopsy from 7/3 revealed Acute myeloid leukemia (AML). FISH - report shows a population of aberrant myeloid blasts. He is currently being treated by Dr. Coelho for high risk AML with HIDAC consolidation chemotherapy. He was referred by Dr. Coelho for an initial consultation of a Haplo- transplant. \par \par The patient has a history of multiple blood clots - factor V Leiden runs in the family. He was diagnosed with sleep apnea, but refuses to use CPAP. He is a former smoker, 40 years. He also has a past medical history of PE, HTN, cardiomyopathy. He recently underwent an amputation of the right first toe due to an infection.\par \par S/p hospitalization at [facility] 10/3/19 - 11/1/19 for haplo (son) SCT. \par Upon admission, a TLC was placed in IR. Mr. Delong received IV fluid hydration, pain management, nutritional support, anxiolysis, antiemetics, and antiviral,  antifungal, antibacterial, GI, PCP and VOD prophylaxis. When his ANC dropped  below 1000, he was started on prophylactic Cefepime. Labs were monitored on a daily basis and he received transfusional support and electrolyte repletion as needed. \par \par While in patient, Mr. Delong was continued on his Voriconazole for history of aspergillosis. \par \par S/p hospitalization at [facility] 10/3/19 - 11/1/19 for haplo (son) SCT. \par Upon admission, a TLC was placed in IR. Mr. Delong received IV fluid hydration, pain management, nutritional support, anxiolysis, antiemetics, and antiviral, antifungal, antibacterial, GI, PCP and VOD prophylaxis. When his ANC dropped below 1000, he was started on prophylactic Cefepime. Labs were monitored on a daily basis and he received transfusional support and electrolyte repletion as needed. \par \par While in patient, Mr. Delong was continued on his Voriconazole for history of aspergillosis. \par \par During admission, Mr. Delong had pancytopenia related to the high dose chemotherapy prep regimen. He also experienced neutropenic fevers. When he became febrile, blood and urine cultures were sent and a CXR was completed which were unremarkable. \par \par On 10/9/2019, following premedications, pt received 250 ml of allogeneic, related, haplo, fresh, mobilized HPC apheresis over 30-60 minutes. \par Cell counts as follows: \par \par Total MNCs (x10^8/kg) = 5.17 \par CD 34 cells (x10^6/kg) = 7.34 \par CD 3 Cells (x 10^7/kg) = 19.81 \par Cell viability (%) = 100 \par \par Pt tolerated infusion without any adverse reaction or complications. \par \par Engraftment was noted on 10/28/2019. Daily Zarxio was discontinued, as was Cefepime given negative cultures. Mr. Delong was discharged home to f/u at the [facility].  [de-identified] : On 7/3/20 visit, day +267 of SCT today. Recurrent pruritic rash on chest, BUEs, and upper back not responsive to triamcinolone cream bid. Stable to slightly improved SOB on exertion. Persistent BLE edema L>R despite taking Lasix 60mg daily per cardiologist's recs. Compliant with post transplant meds and restrictions. Denies fever, chills, headache, dizziness, blurred vision, mucositis/odynophagia, chest pain, cough, nausea/vomiting, diarrhea/constipation, abdominal pain, dysuria, bleeding, pain

## 2020-07-07 LAB
CMV DNA SPEC QL NAA+PROBE: NOT DETECTED
CMVPCR LOG: NOT DETECTED LOG10IU/ML

## 2020-07-08 ENCOUNTER — RESULT REVIEW (OUTPATIENT)
Age: 60
End: 2020-07-08

## 2020-07-08 ENCOUNTER — LABORATORY RESULT (OUTPATIENT)
Age: 60
End: 2020-07-08

## 2020-07-08 ENCOUNTER — APPOINTMENT (OUTPATIENT)
Dept: HEMATOLOGY ONCOLOGY | Facility: CLINIC | Age: 60
End: 2020-07-08
Payer: COMMERCIAL

## 2020-07-08 VITALS
DIASTOLIC BLOOD PRESSURE: 66 MMHG | BODY MASS INDEX: 27.06 KG/M2 | OXYGEN SATURATION: 97 % | RESPIRATION RATE: 17 BRPM | SYSTOLIC BLOOD PRESSURE: 103 MMHG | WEIGHT: 199.51 LBS | HEART RATE: 97 BPM | TEMPERATURE: 98.8 F

## 2020-07-08 LAB
ANISOCYTOSIS BLD QL: SLIGHT — SIGNIFICANT CHANGE UP
BASO STIPL BLD QL SMEAR: PRESENT — SIGNIFICANT CHANGE UP
BASOPHILS # BLD AUTO: 0 K/UL — SIGNIFICANT CHANGE UP (ref 0–0.2)
BASOPHILS NFR BLD AUTO: 0 % — SIGNIFICANT CHANGE UP (ref 0–2)
DACRYOCYTES BLD QL SMEAR: SLIGHT — SIGNIFICANT CHANGE UP
ELLIPTOCYTES BLD QL SMEAR: SLIGHT — SIGNIFICANT CHANGE UP
EOSINOPHIL # BLD AUTO: 0.27 K/UL — SIGNIFICANT CHANGE UP (ref 0–0.5)
EOSINOPHIL NFR BLD AUTO: 2 % — SIGNIFICANT CHANGE UP (ref 0–6)
HCT VFR BLD CALC: 34.9 % — LOW (ref 39–50)
HGB BLD-MCNC: 10.9 G/DL — LOW (ref 13–17)
HYPOCHROMIA BLD QL: SLIGHT — SIGNIFICANT CHANGE UP
LYMPHOCYTES # BLD AUTO: 50 % — HIGH (ref 13–44)
LYMPHOCYTES # BLD AUTO: 6.8 K/UL — HIGH (ref 1–3.3)
MCHC RBC-ENTMCNC: 31.2 GM/DL — LOW (ref 32–36)
MCHC RBC-ENTMCNC: 34.3 PG — HIGH (ref 27–34)
MCV RBC AUTO: 109.7 FL — HIGH (ref 80–100)
MONOCYTES # BLD AUTO: 2.18 K/UL — HIGH (ref 0–0.9)
MONOCYTES NFR BLD AUTO: 16 % — HIGH (ref 2–14)
NEUTROPHILS # BLD AUTO: 4.35 K/UL — SIGNIFICANT CHANGE UP (ref 1.8–7.4)
NEUTROPHILS NFR BLD AUTO: 32 % — LOW (ref 43–77)
NRBC # BLD: 1 /100 — HIGH (ref 0–0)
NRBC # BLD: SIGNIFICANT CHANGE UP /100 WBCS (ref 0–0)
PLAT MORPH BLD: NORMAL — SIGNIFICANT CHANGE UP
PLATELET # BLD AUTO: 77 K/UL — LOW (ref 150–400)
POIKILOCYTOSIS BLD QL AUTO: SLIGHT — SIGNIFICANT CHANGE UP
POLYCHROMASIA BLD QL SMEAR: SLIGHT — SIGNIFICANT CHANGE UP
RBC # BLD: 3.18 M/UL — LOW (ref 4.2–5.8)
RBC # FLD: 16 % — HIGH (ref 10.3–14.5)
RBC BLD AUTO: ABNORMAL
WBC # BLD: 13.6 K/UL — HIGH (ref 3.8–10.5)
WBC # FLD AUTO: 13.6 K/UL — HIGH (ref 3.8–10.5)

## 2020-07-08 PROCEDURE — 99215 OFFICE O/P EST HI 40 MIN: CPT

## 2020-07-10 NOTE — REVIEW OF SYSTEMS
[Fatigue] : fatigue [Recent Change In Weight] : ~T recent weight change [Lower Ext Edema] : lower extremity edema [Skin Rash] : skin rash [Negative] : Heme/Lymph [Fever] : no fever [Chills] : no chills [Night Sweats] : no night sweats [Chest Pain] : no chest pain [Palpitations] : no palpitations [Leg Claudication] : no intermittent leg claudication [Skin Wound] : no skin wound [FreeTextEntry5] : Left lower extremity more edematous than right lower extremity [de-identified] : Pruritic rash of bilateral upper extremities and chest

## 2020-07-10 NOTE — ASSESSMENT
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 Continue to monitor chimerism closely.\par Explained to patient may have to administer DLI if chimerism continues to drop.\par Current disease status: remains VINCE at this time based on today's peripheral blood work. \par Post transplant BMbx completed on 6/25/20.\par \par 2) Heme\par ABO compatible transplant: pt and donor are both A pos\par Ongoing anemia and thrombocytopenia, poss poor graft function \par    7/8/20:   WBC 13.60   ANC 4.35   Hgb 10.9  PLT 77 \par Continue multivitamin and folic acid daily\par \par Hx of recurrent DVT/PE\par Factor V Leiden reportedly runs in the family \par 11/21/19 Doppler US of RUE negative for DVT\par Xarelto on hold while pancytopenic, to resume when PLT consistently >100K\par LE edema poss r/t prednisone, consider Doppler US if edema persists - reportedly improved on 5/22/20 \par Increase lasix to 40 mg daily on 6/23/20. Scheduled for an ECHO on 6/24/20. Echo ordered by cardiologist.\par Continue Lasix 60mg daily per cardiologist.\par Doppler US of BLEs on 7/2/20 negative for DVT\par \par 3) ID\par Continue ppx:\par - Acyclovir 400 mg bid \par - Mepron 750m g bid\par - Voriconazole 200 mg bid - hx of aspergillosis \par Post transplant crowd and dietary restrictions reviewed - in light of covid-19, CDC recs reviewed and strict restrictions reinforced \par \par CMV viremia\par CMV PCR peaked at 8,581 on 11/20/19\par Valcyte 450mg BID started 11/21/19. Decreased to QD as of 12/17/19. Held as of 2/14/20 for ongoing thrombocytopenia \par 5/8/20 CMV PCR not detected\par 5/14/20 CMV PCR not detected\par 5/28/20 CMV PCR not detected\par 6/5/20 CMV PCR not detected\par 7/2/20 CMV PCR not detected\par Continue to monitor PCR at ever visit \par \par 4) GVHD\par Skin 0  Liver 0   GI 0 - overall grade 0\par No signs of acute or chronic GVHD at this time\par Off MMF as of 11/22/19 \par Tacrolimus discontinued on 6/15/20\par Prednisone discontinued on 6/15/20\par Reviewed signs/symptoms of GVHD (i.e. rash, mucositis, nausea/vomiting, diarrhea)\par \par aGVHD of skin \par Maculopapular pruritic rash of face, neck, and upper chest (BSA 19%) noted 1/21/20 in setting of tapering prednisone (for FTT) - max skin stage 1, overall grade 1\par 1/21/20: rash on face, neck, and chest (BSA 19%); skin 1, overall grade 1; prednisone increased to 10mg daily\par 2/14/20: rash on neck, chest, and bilateral upper arms (BSA 18%); skin 1, overall grade 1: prednisone increased to 20mg daily\par 2/21/20: rash on neck and chest (10% BSA); skin 1, overall grade 1. Continue prednisone 20mg daily\par 3/12/20: rash on neck and chest (10% BSA); skin 1, overall grade 1. Continue prednisone 15mg daily  \par 3/19/20: rash on neck and chest (10% BSA); skin 1, overall grade 1. Continue prednisone 15 mg daily\par 4/2/20: rash on neck and chest (10% BSA); skin 1, overall grade 1. Continue prednisone 15 mg daily\par 4/9/20: rash resolved, prednisone decreased to 10mg daily\par 4/17/20: rash on neck and chest (10% BSA); skin 1, overall grade 1. Continue prednisone 10mg daily and reinforced compliance with triamcinolone cream bid \par 5/8/20: waxing and waning pruritic erythema of upper back, unsure if GVHD. Continue prednisone 10mg daily at this time\par 5/14/20: mild erythema of neck and upper back ( 5% BSA); skin 1, overall grade 1. Continue prednisone 10 mg daily\par 5/22/20: faint pruritic erythema of upper chest and upper back (9% BSA); skin 1, overall grade 1. Prednisone decreased 5mg daily for drop in chimerism \par 6/5/20: monitor very faint erythema of chest and upper back. Continue triamcinolone 0.1% cream bid PRN rash \par Continue to monitor closely \par 6/23/20: Mild erythema of upper back and bilateral upper extremities.Continue triamcinolone 0.1% cream bid PRN rash \par 7/2/20: pruritic erythema of upper back, chest, and bilateral upper arms (36% BSA). Defer starting oral immunosuppression at this time given drop in chimerism. Start Claritin daily and Pepcid bid. Will attempt to attain insurance auth for Lidex 0.1% cream bid, continue to monitor closely\par 7/8/20: Pruritic erythema of chest and bilateral upper arms ( 18% BSA). Patient will pay out of pocket today for lidex cream. To be applied BID. Continue claritin and pepcid. \par \par 5) GI\par Continue ppx:\par - Protonix 40 mg daily\par - Ursodiol 300 mg bid\par PRN Zofran and Reglan for nausea/vomiting\par \par 6) Failure to thrive\par Poor PO intake/hydration with resulting hypotension\par Prednisone 20 mg daily started 11/20/19 with resolution of symptoms\par RESOLVED\par \par 7) Cardiac\par HTN - continue metoprolol succinate 25mg daily\par Cardiomyopathy - Lasix to 60 mg daily as per cardiology.\par Continue Coreg 3.125mg bid as prescribed by cardiology \par ECHO completed on  6/24/20. Ordered by cardiology\par \par 8) Other\par BPH - continue finasteride 5mg daily and tamsulosin 0.4mg daily \par Hypomagnesemia - likely 2/2 FK, continue MgOx 400mg tid\par Anxiety/Depression - continue Zoloft 50mg daily\par Insomnia - continue PRN Xanax 0.5-1mg qhs. Medication renewed today.\par \par 9) Plan/Dispo\par Pt educated regarding plan of care, all questions/concerns addressed\par Instructed to contact our office immediately with fever or new/worsening yesterday\par F/u with Dr Gaytan on 7/16/20

## 2020-07-10 NOTE — HISTORY OF PRESENT ILLNESS
[de-identified] : Mr. Delong is a 60 y/o male, with a previously diagnosed acute myeloid leukemia. A bone marrow biopsy from 7/3 revealed Acute myeloid leukemia (AML). FISH - report shows a population of aberrant myeloid blasts. He is currently being treated by Dr. Coelho for high risk AML with HIDAC consolidation chemotherapy. He was referred by Dr. Coelho for an initial consultation of a Haplo- transplant. \par \par The patient has a history of multiple blood clots - factor V Leiden runs in the family. He was diagnosed with sleep apnea, but refuses to use CPAP. He is a former smoker, 40 years. He also has a past medical history of PE, HTN, cardiomyopathy. He recently underwent an amputation of the right first toe due to an infection.\par \par S/p hospitalization at [facility] 10/3/19 - 11/1/19 for haplo (son) SCT. \par Upon admission, a TLC was placed in IR. Mr. Delong received IV fluid hydration, pain management, nutritional support, anxiolysis, antiemetics, and antiviral,  antifungal, antibacterial, GI, PCP and VOD prophylaxis. When his ANC dropped  below 1000, he was started on prophylactic Cefepime. Labs were monitored on a daily basis and he received transfusional support and electrolyte repletion as needed. \par \par While in patient, Mr. Delong was continued on his Voriconazole for history of aspergillosis. \par \par S/p hospitalization at [facility] 10/3/19 - 11/1/19 for haplo (son) SCT. \par Upon admission, a TLC was placed in IR. Mr. Delong received IV fluid hydration, pain management, nutritional support, anxiolysis, antiemetics, and antiviral, antifungal, antibacterial, GI, PCP and VOD prophylaxis. When his ANC dropped below 1000, he was started on prophylactic Cefepime. Labs were monitored on a daily basis and he received transfusional support and electrolyte repletion as needed. \par \par While in patient, Mr. Delong was continued on his Voriconazole for history of aspergillosis. \par \par During admission, Mr. Delong had pancytopenia related to the high dose chemotherapy prep regimen. He also experienced neutropenic fevers. When he became febrile, blood and urine cultures were sent and a CXR was completed which were unremarkable. \par \par On 10/9/2019, following premedications, pt received 250 ml of allogeneic, related, haplo, fresh, mobilized HPC apheresis over 30-60 minutes. \par Cell counts as follows: \par \par Total MNCs (x10^8/kg) = 5.17 \par CD 34 cells (x10^6/kg) = 7.34 \par CD 3 Cells (x 10^7/kg) = 19.81 \par Cell viability (%) = 100 \par \par Pt tolerated infusion without any adverse reaction or complications. \par \par Engraftment was noted on 10/28/2019. Daily Zarxio was discontinued, as was Cefepime given negative cultures. Mr. Delong was discharged home to f/u at the [facility].  [de-identified] : On 7/3/20 visit, day +267 of SCT today. Recurrent pruritic rash on chest, BUEs, and upper back not responsive to triamcinolone cream bid. Stable to slightly improved SOB on exertion. Persistent BLE edema L>R despite taking Lasix 60mg daily per cardiologist's recs. Compliant with post transplant meds and restrictions. Denies fever, chills, headache, dizziness, blurred vision, mucositis/odynophagia, chest pain, cough, nausea/vomiting, diarrhea/constipation, abdominal pain, dysuria, bleeding, pain \par \par On 7/8/20, patient presents for a follow up visit. Continues to complain of pruritic rash of bilateral upper extremities and chest. Lidex cream was prescribed last week but, an urgent appeal is being processed in order for lidex cream to be covered by insurance. Has been applying steroid cream twice and day and notes improvement.  Continues to have bilateral lower extremity edema, Left worse than right. Bilateral lower extremity ultrasound is negative from 7/2/20. On lasix 60 mg daily as per cardiologist. Recently had an echo ordered by cardiologist. Denies fever, chills, nausea, vomiting, diarrhea, mouth sores, dysuria or any signs of active bleeding. Denies SOB, chest pain or B/L LE edema. Remains compliant with post transplant diet and crowd restrictions. Remains compliant with post transplant medications.

## 2020-07-10 NOTE — REASON FOR VISIT
[Acute Myeloid Leukemia] : acute myeloid leukemia [Follow-Up Visit] : a follow-up visit for [Other: _____] : [unfilled] [FreeTextEntry2] : S/p haplo (son) PBSCT on 10/9/19

## 2020-07-10 NOTE — PHYSICAL EXAM
[Ambulatory and capable of all self care but unable to carry out any work activities] : Status 2- Ambulatory and capable of all self care but unable to carry out any work activities. Up and about more than 50% of waking hours [Normal] : affect appropriate [de-identified] : +1 BLE edema L>R [de-identified] : +BS; abdomen soft, non-distended, non-tender [de-identified] : pruritic erythematous rash of chest, BUEs.

## 2020-07-13 ENCOUNTER — RX RENEWAL (OUTPATIENT)
Age: 60
End: 2020-07-13

## 2020-07-14 ENCOUNTER — APPOINTMENT (OUTPATIENT)
Dept: HEMATOLOGY ONCOLOGY | Facility: CLINIC | Age: 60
End: 2020-07-14

## 2020-07-14 ENCOUNTER — RESULT REVIEW (OUTPATIENT)
Age: 60
End: 2020-07-14

## 2020-07-14 LAB
ANISOCYTOSIS BLD QL: SLIGHT — SIGNIFICANT CHANGE UP
BASOPHILS # BLD AUTO: 0 K/UL — SIGNIFICANT CHANGE UP (ref 0–0.2)
BASOPHILS NFR BLD AUTO: 0 % — SIGNIFICANT CHANGE UP (ref 0–2)
DACRYOCYTES BLD QL SMEAR: SLIGHT — SIGNIFICANT CHANGE UP
ELLIPTOCYTES BLD QL SMEAR: SLIGHT — SIGNIFICANT CHANGE UP
EOSINOPHIL # BLD AUTO: 0 K/UL — SIGNIFICANT CHANGE UP (ref 0–0.5)
EOSINOPHIL NFR BLD AUTO: 0 % — SIGNIFICANT CHANGE UP (ref 0–6)
HCT VFR BLD CALC: 30.7 % — LOW (ref 39–50)
HGB BLD-MCNC: 9.6 G/DL — LOW (ref 13–17)
HYPOCHROMIA BLD QL: SLIGHT — SIGNIFICANT CHANGE UP
LYMPHOCYTES # BLD AUTO: 28 % — SIGNIFICANT CHANGE UP (ref 13–44)
LYMPHOCYTES # BLD AUTO: 5.46 K/UL — HIGH (ref 1–3.3)
MACROCYTES BLD QL: SLIGHT — SIGNIFICANT CHANGE UP
MCHC RBC-ENTMCNC: 31.3 GM/DL — LOW (ref 32–36)
MCHC RBC-ENTMCNC: 33.7 PG — SIGNIFICANT CHANGE UP (ref 27–34)
MCV RBC AUTO: 107.7 FL — HIGH (ref 80–100)
MONOCYTES # BLD AUTO: 1.37 K/UL — HIGH (ref 0–0.9)
MONOCYTES NFR BLD AUTO: 7 % — SIGNIFICANT CHANGE UP (ref 2–14)
NEUTROPHILS # BLD AUTO: 12.68 K/UL — HIGH (ref 1.8–7.4)
NEUTROPHILS NFR BLD AUTO: 65 % — SIGNIFICANT CHANGE UP (ref 43–77)
NRBC # BLD: 1 /100 — HIGH (ref 0–0)
NRBC # BLD: SIGNIFICANT CHANGE UP /100 WBCS (ref 0–0)
PLAT MORPH BLD: NORMAL — SIGNIFICANT CHANGE UP
PLATELET # BLD AUTO: 59 K/UL — LOW (ref 150–400)
POIKILOCYTOSIS BLD QL AUTO: SLIGHT — SIGNIFICANT CHANGE UP
POLYCHROMASIA BLD QL SMEAR: SLIGHT — SIGNIFICANT CHANGE UP
RBC # BLD: 2.85 M/UL — LOW (ref 4.2–5.8)
RBC # FLD: 16.3 % — HIGH (ref 10.3–14.5)
RBC BLD AUTO: ABNORMAL
SARS-COV-2 RNA SPEC QL NAA+PROBE: SIGNIFICANT CHANGE UP
WBC # BLD: 19.51 K/UL — HIGH (ref 3.8–10.5)
WBC # FLD AUTO: 19.51 K/UL — HIGH (ref 3.8–10.5)

## 2020-07-15 ENCOUNTER — APPOINTMENT (OUTPATIENT)
Dept: HEMATOLOGY ONCOLOGY | Facility: CLINIC | Age: 60
End: 2020-07-15
Payer: COMMERCIAL

## 2020-07-15 VITALS
OXYGEN SATURATION: 93 % | HEART RATE: 96 BPM | DIASTOLIC BLOOD PRESSURE: 64 MMHG | TEMPERATURE: 98.8 F | RESPIRATION RATE: 16 BRPM | SYSTOLIC BLOOD PRESSURE: 99 MMHG

## 2020-07-15 DIAGNOSIS — Z86.79 PERSONAL HISTORY OF OTHER DISEASES OF THE CIRCULATORY SYSTEM: ICD-10-CM

## 2020-07-15 LAB
ALBUMIN SERPL ELPH-MCNC: 3.8 G/DL
ALP BLD-CCNC: 101 U/L
ALT SERPL-CCNC: 10 U/L
ANION GAP SERPL CALC-SCNC: 17 MMOL/L
APPEARANCE: ABNORMAL
AST SERPL-CCNC: 19 U/L
BACTERIA: NEGATIVE
BILIRUB SERPL-MCNC: 0.4 MG/DL
BILIRUBIN URINE: NEGATIVE
BLOOD URINE: NEGATIVE
BUN SERPL-MCNC: 24 MG/DL
CALCIUM SERPL-MCNC: 8.8 MG/DL
CHLORIDE SERPL-SCNC: 100 MMOL/L
CMV DNA SPEC QL NAA+PROBE: NOT DETECTED
CMVPCR LOG: NOT DETECTED LOG10IU/ML
CO2 SERPL-SCNC: 23 MMOL/L
COLOR: YELLOW
CREAT SERPL-MCNC: 1.4 MG/DL
ENGRAFTMNET-POST: NORMAL
GLUCOSE QUALITATIVE U: NEGATIVE
GLUCOSE SERPL-MCNC: 113 MG/DL
HYALINE CASTS: 0 /LPF
KETONES URINE: NEGATIVE
LDH SERPL-CCNC: 240 U/L
LEUKOCYTE ESTERASE URINE: ABNORMAL
MAGNESIUM SERPL-MCNC: 2.1 MG/DL
MICROSCOPIC-UA: NORMAL
NITRITE URINE: NEGATIVE
PH URINE: 5.5
POTASSIUM SERPL-SCNC: 4.3 MMOL/L
PROT SERPL-MCNC: 5.8 G/DL
PROTEIN URINE: NORMAL
RED BLOOD CELLS URINE: 1 /HPF
SODIUM SERPL-SCNC: 141 MMOL/L
SPECIFIC GRAVITY URINE: 1.02
SQUAMOUS EPITHELIAL CELLS: 7 /HPF
UROBILINOGEN URINE: NORMAL
WHITE BLOOD CELLS URINE: 9 /HPF

## 2020-07-15 PROCEDURE — 99215 OFFICE O/P EST HI 40 MIN: CPT

## 2020-07-16 ENCOUNTER — OUTPATIENT (OUTPATIENT)
Dept: OUTPATIENT SERVICES | Facility: HOSPITAL | Age: 60
LOS: 1 days | Discharge: HOME | End: 2020-07-16

## 2020-07-16 DIAGNOSIS — C92.00 ACUTE MYELOBLASTIC LEUKEMIA, NOT HAVING ACHIEVED REMISSION: ICD-10-CM

## 2020-07-17 LAB — BACTERIA UR CULT: ABNORMAL

## 2020-07-20 PROBLEM — Z86.79 HISTORY OF CARDIOMYOPATHY: Status: RESOLVED | Noted: 2019-09-24 | Resolved: 2020-07-20

## 2020-07-20 LAB — BACTERIA BLD CULT: NORMAL

## 2020-07-20 NOTE — HISTORY OF PRESENT ILLNESS
[de-identified] : Mr. Delong is a 58 y/o male, with a previously diagnosed acute myeloid leukemia. A bone marrow biopsy from 7/3 revealed Acute myeloid leukemia (AML). FISH - report shows a population of aberrant myeloid blasts. He is currently being treated by Dr. Coelho for high risk AML with HIDAC consolidation chemotherapy. He was referred by Dr. Coelho for an initial consultation of a Haplo- transplant. \par \par The patient has a history of multiple blood clots - factor V Leiden runs in the family. He was diagnosed with sleep apnea, but refuses to use CPAP. He is a former smoker, 40 years. He also has a past medical history of PE, HTN, cardiomyopathy. He recently underwent an amputation of the right first toe due to an infection.\par \par S/p hospitalization at [facility] 10/3/19 - 11/1/19 for haplo (son) SCT. \par Upon admission, a TLC was placed in IR. Mr. Delong received IV fluid hydration, pain management, nutritional support, anxiolysis, antiemetics, and antiviral,  antifungal, antibacterial, GI, PCP and VOD prophylaxis. When his ANC dropped  below 1000, he was started on prophylactic Cefepime. Labs were monitored on a daily basis and he received transfusional support and electrolyte repletion as needed. \par \par While in patient, Mr. Delong was continued on his Voriconazole for history of aspergillosis. \par \par S/p hospitalization at [facility] 10/3/19 - 11/1/19 for haplo (son) SCT. \par Upon admission, a TLC was placed in IR. Mr. Delong received IV fluid hydration, pain management, nutritional support, anxiolysis, antiemetics, and antiviral, antifungal, antibacterial, GI, PCP and VOD prophylaxis. When his ANC dropped below 1000, he was started on prophylactic Cefepime. Labs were monitored on a daily basis and he received transfusional support and electrolyte repletion as needed. \par \par While in patient, Mr. Delong was continued on his Voriconazole for history of aspergillosis. \par \par During admission, Mr. Delong had pancytopenia related to the high dose chemotherapy prep regimen. He also experienced neutropenic fevers. When he became febrile, blood and urine cultures were sent and a CXR was completed which were unremarkable. \par \par On 10/9/2019, following premedications, pt received 250 ml of allogeneic, related, haplo, fresh, mobilized HPC apheresis over 30-60 minutes. \par Cell counts as follows: \par \par Total MNCs (x10^8/kg) = 5.17 \par CD 34 cells (x10^6/kg) = 7.34 \par CD 3 Cells (x 10^7/kg) = 19.81 \par Cell viability (%) = 100 \par \par Pt tolerated infusion without any adverse reaction or complications. \par \par Engraftment was noted on 10/28/2019. Daily Zarxio was discontinued, as was Cefepime given negative cultures. Mr. Delong was discharged home to f/u at the [facility].  [de-identified] : On 7/3/20 visit, day +267 of SCT today. Recurrent pruritic rash on chest, BUEs, and upper back not responsive to triamcinolone cream bid. Stable to slightly improved SOB on exertion. Persistent BLE edema L>R despite taking Lasix 60mg daily per cardiologist's recs. Compliant with post transplant meds and restrictions. Denies fever, chills, headache, dizziness, blurred vision, mucositis/odynophagia, chest pain, cough, nausea/vomiting, diarrhea/constipation, abdominal pain, dysuria, bleeding, pain \par \par On 7/8/20, patient presents for a follow up visit. Continues to complain of pruritic rash of bilateral upper extremities and chest. Lidex cream was prescribed last week but, an urgent appeal is being processed in order for lidex cream to be covered by insurance. Has been applying steroid cream twice and day and notes improvement.  Continues to have bilateral lower extremity edema, Left worse than right. Bilateral lower extremity ultrasound is negative from 7/2/20. On lasix 60 mg daily as per cardiologist. Recently had an echo ordered by cardiologist. Denies fever, chills, nausea, vomiting, diarrhea, mouth sores, dysuria or any signs of active bleeding. Denies SOB, chest pain or B/L LE edema. Remains compliant with post transplant diet and crowd restrictions. Remains compliant with post transplant medications. \par \par Today fever continues....some fatigue....cx sent yesterday blood, urine...No cough, no n/v/d...of fk for falling chimerism..Ashlyn is on phone

## 2020-07-20 NOTE — PHYSICAL EXAM
[Ambulatory and capable of all self care but unable to carry out any work activities] : Status 2- Ambulatory and capable of all self care but unable to carry out any work activities. Up and about more than 50% of waking hours [Normal] : affect appropriate [de-identified] : trace  BLE edema L>R [de-identified] : pruritic erythematous rash of chest, BUEs.resolved [de-identified] : +BS; abdomen soft, non-distended, non-tender

## 2020-07-20 NOTE — ASSESSMENT
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 with the following comorbidities being managed:\par \par 1) AML\par S/p haplo (son) PSCT on 10/9/19\par 6/5/20 chimerism= 74.3% donor\par 6/23/20, 7/8/20  chimerism = 67.7% donor (CD33 = 30% and CD3 = 100% donor). Continue to monitor chimerism closely.\par Explained to patient may have to administer DLI if chimerism continues to drop.\par Current disease status: remains VINCE at this time based on today's peripheral blood work. \par Post transplant BMbx completed on 6/25/20. VINCE...\par \par 2) Heme\par ABO compatible transplant: pt and donor are both A pos\par Ongoing anemia and thrombocytopenia, poss poor graft function \par    7/8/20:   WBC 13.60   ANC 4.35   Hgb 10.9  PLT 77 \par Continue multivitamin and folic acid daily\par \par Hx of recurrent DVT/PE\par Factor V Leiden reportedly runs in the family \par 11/21/19 Doppler US of RUE negative for DVT\par Xarelto on hold while pancytopenic, to resume when PLT consistently >100K\par LE edema poss r/t prednisone, consider Doppler US if edema persists - reportedly improved on 5/22/20 \par Increase lasix to 40 mg daily on 6/23/20. Scheduled for an ECHO on 6/24/20. Echo ordered by cardiologist.\par Continue Lasix 60mg daily per cardiologist.\par Doppler US of BLEs on 7/2/20 negative for DVT\par \par 3) ID\par Continue ppx:\par fever..new with taper of fk vs infection\par f/u cx sent yesterday..start levaquin for possible urine source...covid neg\par - Acyclovir 400 mg bid \par - Mepron 750m g bid\par - Voriconazole 200 mg bid - hx of aspergillosis \par Post transplant crowd and dietary restrictions reviewed - in light of covid-19, CDC recs reviewed and strict restrictions reinforced \par \par CMV viremia\par CMV PCR peaked at 8,581 on 11/20/19\par Valcyte 450mg BID started 11/21/19. Decreased to QD as of 12/17/19. Held as of 2/14/20 for ongoing thrombocytopenia \par 5/8/20 CMV PCR not detected\par 5/14/20 CMV PCR not detected\par 5/28/20 CMV PCR not detected\par 6/5/20 CMV PCR not detected\par 7/2/20 CMV PCR not detected\par Continue to monitor PCR at ever visit \par \par 4) GVHD\par Skin 0  Liver 0   GI 0 - overall grade 0\par No signs of acute or chronic GVHD at this time\par Off MMF as of 11/22/19 \par Tacrolimus discontinued on 6/15/20\par Prednisone discontinued on 6/15/20\par Reviewed signs/symptoms of GVHD (i.e. rash, mucositis, nausea/vomiting, diarrhea)\par \par aGVHD of skin \par Maculopapular pruritic rash of face, neck, and upper chest (BSA 19%) noted 1/21/20 in setting of tapering prednisone (for FTT) - max skin stage 1, overall grade 1\par 1/21/20: rash on face, neck, and chest (BSA 19%); skin 1, overall grade 1; prednisone increased to 10mg daily\par 2/14/20: rash on neck, chest, and bilateral upper arms (BSA 18%); skin 1, overall grade 1: prednisone increased to 20mg daily\par 2/21/20: rash on neck and chest (10% BSA); skin 1, overall grade 1. Continue prednisone 20mg daily\par 3/12/20: rash on neck and chest (10% BSA); skin 1, overall grade 1. Continue prednisone 15mg daily  \par 3/19/20: rash on neck and chest (10% BSA); skin 1, overall grade 1. Continue prednisone 15 mg daily\par 4/2/20: rash on neck and chest (10% BSA); skin 1, overall grade 1. Continue prednisone 15 mg daily\par 4/9/20: rash resolved, prednisone decreased to 10mg daily\par 4/17/20: rash on neck and chest (10% BSA); skin 1, overall grade 1. Continue prednisone 10mg daily and reinforced compliance with triamcinolone cream bid \par 5/8/20: waxing and waning pruritic erythema of upper back, unsure if GVHD. Continue prednisone 10mg daily at this time\par 5/14/20: mild erythema of neck and upper back ( 5% BSA); skin 1, overall grade 1. Continue prednisone 10 mg daily\par 5/22/20: faint pruritic erythema of upper chest and upper back (9% BSA); skin 1, overall grade 1. Prednisone decreased 5mg daily for drop in chimerism \par 6/5/20: monitor very faint erythema of chest and upper back. Continue triamcinolone 0.1% cream bid PRN rash \par Continue to monitor closely \par 6/23/20: Mild erythema of upper back and bilateral upper extremities.Continue triamcinolone 0.1% cream bid PRN rash \par 7/2/20: pruritic erythema of upper back, chest, and bilateral upper arms (36% BSA). Defer starting oral immunosuppression at this time given drop in chimerism. Start Claritin daily and Pepcid bid. Will attempt to attain insurance auth for Lidex 0.1% cream bid, continue to monitor closely\par 7/8/20: Pruritic erythema of chest and bilateral upper arms ( 18% BSA). Patient will pay out of pocket today for lidex cream. To be applied BID. Continue claritin and pepcid. \par \par 5) GI\par Continue ppx:\par - Protonix 40 mg daily\par - Ursodiol 300 mg bid\par PRN Zofran and Reglan for nausea/vomiting\par \par 6) Failure to thrive\par Poor PO intake/hydration with resulting hypotension\par Prednisone 20 mg daily started 11/20/19 with resolution of symptoms\par RESOLVED\par \par 7) Cardiac\par HTN - continue metoprolol succinate 25mg daily\par Cardiomyopathy - Lasix to 60 mg daily as per cardiology.\par Continue Coreg 3.125mg bid as prescribed by cardiology \par ECHO completed on  6/24/20. Ordered by cardiology\par \par 8) Other\par BPH - continue finasteride 5mg daily and tamsulosin 0.4mg daily \par Hypomagnesemia - likely 2/2 FK, continue MgOx 400mg tid\par Anxiety/Depression - continue Zoloft 50mg daily\par Insomnia - continue PRN Xanax 0.5-1mg qhs. Medication renewed today.\par \par 9) Plan/Dispo\par Pt educated regarding plan of care, all questions/concerns addressed\par Instructed to contact our office immediately with worsening fever or new/worsening sx\par F/u 1 week

## 2020-07-20 NOTE — REVIEW OF SYSTEMS
[Fever] : fever [Fatigue] : fatigue [Recent Change In Weight] : ~T recent weight change [Lower Ext Edema] : lower extremity edema [Skin Rash] : skin rash [Negative] : Endocrine [Chills] : no chills [Night Sweats] : no night sweats [Chest Pain] : no chest pain [Leg Claudication] : no intermittent leg claudication [Palpitations] : no palpitations [FreeTextEntry5] : trace Left lower extremity more edematous than right lower extremity [Skin Wound] : no skin wound [de-identified] : Pruritic rash of bilateral upper extremities and chest resolved

## 2020-07-20 NOTE — REASON FOR VISIT
Subjective:      Patient ID: Jenny Tavares is a 27 y.o. male. HPI patient presents today for his medication check up. Patient is here today to follow up for their chronic conditions. Didn't start the zoloft, too worried about SE. Anxiety is doing ok. Less worry about GI system. No bowel issues, working normal.    Stomach doing ok on omeprazole. Occasionally will eat something that will upset it but overall ok. Appetite better, eating more but still careful about what he eats. Avoids spicy, etc.      Review of Systems    Objective:   Physical Exam    Vitals:    01/10/18 1039   BP: 116/78   Site: Left Arm   Position: Sitting   Cuff Size: Large Adult   Pulse: 79   SpO2: 98%   Weight: 150 lb (68 kg)     Wt Readings from Last 3 Encounters:   01/10/18 150 lb (68 kg)   10/10/17 144 lb (65.3 kg)   08/23/17 137 lb (62.1 kg)     Body mass index is 21.52 kg/m². Alert and oriented x 4 NAD, normal appearing weight, well hydrated, well developed. Assessment:      Nancy Stovall was seen today for 3 month follow-up. Diagnoses and all orders for this visit:    Gastroesophageal reflux disease without esophagitis  -Stable, continue current medications.     Anxiety  Doing well with no medication  Monitor  Discussed weight is doing well, back to a healthy weight    F/u in fall, sooner if needed [Acute Myeloid Leukemia] : acute myeloid leukemia [Follow-Up Visit] : a follow-up visit for

## 2020-07-21 ENCOUNTER — LABORATORY RESULT (OUTPATIENT)
Age: 60
End: 2020-07-21

## 2020-07-21 ENCOUNTER — RESULT REVIEW (OUTPATIENT)
Age: 60
End: 2020-07-21

## 2020-07-21 ENCOUNTER — APPOINTMENT (OUTPATIENT)
Dept: HEMATOLOGY ONCOLOGY | Facility: CLINIC | Age: 60
End: 2020-07-21
Payer: COMMERCIAL

## 2020-07-21 VITALS
DIASTOLIC BLOOD PRESSURE: 65 MMHG | WEIGHT: 193.35 LBS | SYSTOLIC BLOOD PRESSURE: 97 MMHG | TEMPERATURE: 98.4 F | HEART RATE: 93 BPM | BODY MASS INDEX: 26.22 KG/M2 | OXYGEN SATURATION: 94 % | RESPIRATION RATE: 17 BRPM

## 2020-07-21 DIAGNOSIS — R60.0 LOCALIZED EDEMA: ICD-10-CM

## 2020-07-21 DIAGNOSIS — R63.0 ANOREXIA: ICD-10-CM

## 2020-07-21 DIAGNOSIS — R19.7 DIARRHEA, UNSPECIFIED: ICD-10-CM

## 2020-07-21 LAB
ANISOCYTOSIS BLD QL: SLIGHT — SIGNIFICANT CHANGE UP
BASOPHILS # BLD AUTO: 0 K/UL — SIGNIFICANT CHANGE UP (ref 0–0.2)
BASOPHILS NFR BLD AUTO: 0 % — SIGNIFICANT CHANGE UP (ref 0–2)
DACRYOCYTES BLD QL SMEAR: SLIGHT — SIGNIFICANT CHANGE UP
ELLIPTOCYTES BLD QL SMEAR: SLIGHT — SIGNIFICANT CHANGE UP
EOSINOPHIL # BLD AUTO: 0 K/UL — SIGNIFICANT CHANGE UP (ref 0–0.5)
EOSINOPHIL NFR BLD AUTO: 0 % — SIGNIFICANT CHANGE UP (ref 0–6)
HCT VFR BLD CALC: 32 % — LOW (ref 39–50)
HGB BLD-MCNC: 9.8 G/DL — LOW (ref 13–17)
LYMPHOCYTES # BLD AUTO: 55 % — HIGH (ref 13–44)
LYMPHOCYTES # BLD AUTO: 7.13 K/UL — HIGH (ref 1–3.3)
MACROCYTES BLD QL: SLIGHT — SIGNIFICANT CHANGE UP
MCHC RBC-ENTMCNC: 30.6 GM/DL — LOW (ref 32–36)
MCHC RBC-ENTMCNC: 33.1 PG — SIGNIFICANT CHANGE UP (ref 27–34)
MCV RBC AUTO: 108.1 FL — HIGH (ref 80–100)
MONOCYTES # BLD AUTO: 1.04 K/UL — HIGH (ref 0–0.9)
MONOCYTES NFR BLD AUTO: 8 % — SIGNIFICANT CHANGE UP (ref 2–14)
NEUTROPHILS # BLD AUTO: 4.8 K/UL — SIGNIFICANT CHANGE UP (ref 1.8–7.4)
NEUTROPHILS NFR BLD AUTO: 37 % — LOW (ref 43–77)
NRBC # BLD: 1 /100 — HIGH (ref 0–0)
NRBC # BLD: SIGNIFICANT CHANGE UP /100 WBCS (ref 0–0)
PLAT MORPH BLD: NORMAL — SIGNIFICANT CHANGE UP
PLATELET # BLD AUTO: 60 K/UL — LOW (ref 150–400)
POIKILOCYTOSIS BLD QL AUTO: SLIGHT — SIGNIFICANT CHANGE UP
POLYCHROMASIA BLD QL SMEAR: SLIGHT — SIGNIFICANT CHANGE UP
RBC # BLD: 2.96 M/UL — LOW (ref 4.2–5.8)
RBC # FLD: 16.5 % — HIGH (ref 10.3–14.5)
RBC BLD AUTO: ABNORMAL
WBC # BLD: 12.96 K/UL — HIGH (ref 3.8–10.5)
WBC # FLD AUTO: 12.96 K/UL — HIGH (ref 3.8–10.5)

## 2020-07-21 PROCEDURE — 99214 OFFICE O/P EST MOD 30 MIN: CPT

## 2020-07-21 RX ORDER — TRIAMCINOLONE ACETONIDE 1 MG/G
0.1 CREAM TOPICAL 3 TIMES DAILY
Qty: 30 | Refills: 1 | Status: DISCONTINUED | COMMUNITY
Start: 2020-01-22 | End: 2020-07-21

## 2020-07-21 RX ORDER — LEVOFLOXACIN 250 MG/1
250 TABLET, FILM COATED ORAL DAILY
Qty: 7 | Refills: 1 | Status: DISCONTINUED | COMMUNITY
Start: 2020-07-15 | End: 2020-07-21

## 2020-07-21 NOTE — ASSESSMENT
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 with the following comorbidities being managed:\par \par 1) AML\par S/p haplo (son) PSCT on 10/9/19\par 6/5/20 chimerism = 74.3% donor\par 6/23/20 chimerism = 67.7% donor (CD33 = 30% and CD3 = 100% donor)\par 6/25/20 BMbx: VINCE, normal male karyotype \par 7/8/20 chimerism = 57.7% donor (CD3 = 99% and CD33 = 10% donor)\par \par Current disease status: VINCE based on 6/25/20 BMbx and ongoing peripheral blood work\par Explained to patient may have to administer DLI if chimerism continues to drop\par \par 2) Heme\par ABO compatible transplant: pt and donor are both A pos\par Ongoing anemia and thrombocytopenia, poss poor graft function with dropping CD33 chimerism \par    7/21/20:   WBC 12.96   ANC 4.8   Hgb 9.8   PLT 60\par Continue multivitamin and folic acid daily\par \par Hx of recurrent DVT/PE\par Factor V Leiden reportedly runs in the family \par 11/21/19 Doppler US of RUE negative for DVT\par Xarelto on hold while pancytopenic, to resume when PLT consistently >100K\par LE edema poss r/t prednisone, consider Doppler US if edema persists - reportedly improved on 5/22/20 \par Increase lasix to 40 mg daily on 6/23/20. Scheduled for an ECHO on 6/24/20. Echo ordered by cardiologist.\par Continue Lasix 60mg daily per cardiologist.\par Doppler US of BLEs on 7/2/20 negative for DVT\par \par 3) ID\par Continue ppx:\par - Acyclovir 400 mg bid \par - Mepron 750m g bid\par - Voriconazole 200 mg bid - hx of aspergillosis \par Post transplant crowd and dietary restrictions reviewed - in light of covid-19, CDC recs reviewed and strict restrictions reinforced \par \par Fevers\par COVID and blood cx negative on 7/14/20\par 7/14/20 Urine cx ++ 50-00K CFU/mL staph aureus, pt asymptomatic\par Augmentin started 7/17/20, to complete course on 7/23/20 \par Instructed to check temp prior to taking PRN Tylenol as not to mask ongoing fevers\par \par CMV viremia\par CMV PCR peaked at 8,581 on 11/20/19\par Valcyte 450mg BID started 11/21/19. Decreased to QD as of 12/17/19. Held as of 2/14/20 for ongoing thrombocytopenia \par 7/14/20 CMV PCR not detected\par Continue to monitor PCR at ever visit \par \par 4) GVHD\par Skin 0  Liver 0   GI 0 - overall grade 0\par No signs of acute or chronic GVHD at this time\par Off MMF as of 11/22/19 \par Tacrolimus discontinued on 6/15/20\par Prednisone discontinued on 6/15/20\par Reviewed signs/symptoms of GVHD (i.e. rash, mucositis, nausea/vomiting, diarrhea)\par \par aGVHD of skin \par Maculopapular pruritic rash of face, neck, and upper chest (BSA 19%) noted 1/21/20 in setting of tapering prednisone (for FTT) - max skin stage 1, overall grade 1\par 1/21/20: rash on face, neck, and chest (BSA 19%); skin 1, overall grade 1; prednisone increased to 10mg daily\par 2/14/20: rash on neck, chest, and bilateral upper arms (BSA 18%); skin 1, overall grade 1: prednisone increased to 20mg daily\par 2/21/20: rash on neck and chest (10% BSA); skin 1, overall grade 1. Continue prednisone 20mg daily\par 3/12/20: rash on neck and chest (10% BSA); skin 1, overall grade 1. Continue prednisone 15mg daily  \par 3/19/20: rash on neck and chest (10% BSA); skin 1, overall grade 1. Continue prednisone 15 mg daily\par 4/2/20: rash on neck and chest (10% BSA); skin 1, overall grade 1. Continue prednisone 15 mg daily\par 4/9/20: rash resolved, prednisone decreased to 10mg daily\par 4/17/20: rash on neck and chest (10% BSA); skin 1, overall grade 1. Continue prednisone 10mg daily and reinforced compliance with triamcinolone cream bid \par 5/8/20: waxing and waning pruritic erythema of upper back, unsure if GVHD. Continue prednisone 10mg daily at this time\par 5/14/20: mild erythema of neck and upper back ( 5% BSA); skin 1, overall grade 1. Continue prednisone 10 mg daily\par 5/22/20: faint pruritic erythema of upper chest and upper back (9% BSA); skin 1, overall grade 1. Prednisone decreased 5mg daily for drop in chimerism \par 6/5/20: monitor very faint erythema of chest and upper back. Continue triamcinolone 0.1% cream bid PRN rash \par Continue to monitor closely \par 6/23/20: Mild erythema of upper back and bilateral upper extremities.Continue triamcinolone 0.1% cream bid PRN rash \par 7/2/20: pruritic erythema of upper back, chest, and bilateral upper arms (36% BSA). Defer starting oral immunosuppression at this time given drop in chimerism. Start Claritin daily and Pepcid bid. Will attempt to attain insurance auth for Lidex 0.1% cream bid, continue to monitor closely\par 7/8/20: Pruritic erythema of chest and bilateral upper arms ( 18% BSA). Patient will pay out of pocket today for lidex cream. To be applied BID. Continue claritin and pepcid. \par 7/21/20: no rash \par \par 5) GI\par Continue ppx:\par - Protonix 40 mg daily\par - Ursodiol 300 mg bid\par PRN Zofran and Reglan for nausea/vomiting\par \par Diarrhea\par ?abx related vs infectious vs GVHD\par C Diff ordered 7/21/20, pt to drop off sample tmrw 7/22/20\par BRAT diet reinforced\par Continue to monitor closely \par \par 6) Failure to thrive\par Poor PO intake/hydration with resulting hypotension\par Prednisone 20 mg daily started 11/20/19 with resolution of symptoms\par RESOLVED\par \par 7) Cardiac\par HTN - continue metoprolol succinate 25mg daily\par Cardiomyopathy - Lasix to 60 mg daily as per cardiology - may trial decreasing to 40mg daily given borderline hypotension\par Continue Coreg 3.125mg bid as prescribed by cardiology \par ECHO completed on  6/24/20, ordered by cardiology\par \par 8) Other\par BPH - continue finasteride 5mg daily and tamsulosin 0.4mg daily \par Hypomagnesemia - likely 2/2 FK, continue MgOx 400mg tid\par Anxiety/Depression - continue Zoloft 50mg daily\par Insomnia - continue PRN Xanax 0.5-1mg qhs\par NAGA - Cr = 1.4 on 7/14/20, encouraged to drink 1L of fluid daily but caution given hx of CHF, continue to monitor closely \par \par 9) Plan/Dispo\par Pt educated regarding plan of care, all questions/concerns addressed\par Instructed to contact our office immediately with worsening fever or new/worsening sx\par F/u with NP on 7/31/20

## 2020-07-21 NOTE — REASON FOR VISIT
[Follow-Up Visit] : a follow-up visit for [FreeTextEntry2] : S/p haplo (son) PBSCT on 10/9/19 [Other: _____] : [unfilled] [Acute Myeloid Leukemia] : acute myeloid leukemia

## 2020-07-21 NOTE — PHYSICAL EXAM
[Ambulatory and capable of all self care but unable to carry out any work activities] : Status 2- Ambulatory and capable of all self care but unable to carry out any work activities. Up and about more than 50% of waking hours [Normal] : affect appropriate [de-identified] : +BS; abdomen soft, non-distended, non-tender [de-identified] : +1 BLE edema L>R, improved

## 2020-07-21 NOTE — HISTORY OF PRESENT ILLNESS
[de-identified] : On 7/21/20 visit, day +286 of SCT today. Had a fever once last week, since then he has been taking Tylenol approx bid for muscles aches but has not been checking temp prior to Tylenol so unsure if he's still having fevers. Ongoing SOB with exertion is overall improved. Main complaint is poor appetite although he is able to eat and drink OK but just lacks the appetite and forgets to eat at times. Stable occasional cough. New onset diarrhea 3-5x/day for past 1wk, taking PRN Imodium which is somewhat helpful. Remains on Lasix 60mg daily for BLE edema, L>R which is much improved now. Compliant with post transplant meds and restrictions. Denies fever, chills, headache, dizziness, blurred vision, mucositis/odynophagia, chest pain, nausea/vomiting, constipation, abdominal pain, dysuria, rash, bleeding, pain\par  [de-identified] : Mr. Delong is a 60 y/o male, with a previously diagnosed acute myeloid leukemia. A bone marrow biopsy from 7/3 revealed Acute myeloid leukemia (AML). FISH - report shows a population of aberrant myeloid blasts. He is currently being treated by Dr. Coelho for high risk AML with HIDAC consolidation chemotherapy. He was referred by Dr. Coelho for an initial consultation of a Haplo- transplant. \par \par The patient has a history of multiple blood clots - factor V Leiden runs in the family. He was diagnosed with sleep apnea, but refuses to use CPAP. He is a former smoker, 40 years. He also has a past medical history of PE, HTN, cardiomyopathy. He recently underwent an amputation of the right first toe due to an infection.\par \par S/p hospitalization at [facility] 10/3/19 - 11/1/19 for haplo (son) SCT. \par Upon admission, a TLC was placed in IR. Mr. Delong received IV fluid hydration, pain management, nutritional support, anxiolysis, antiemetics, and antiviral,  antifungal, antibacterial, GI, PCP and VOD prophylaxis. When his ANC dropped  below 1000, he was started on prophylactic Cefepime. Labs were monitored on a daily basis and he received transfusional support and electrolyte repletion as needed. \par \par While in patient, Mr. Delong was continued on his Voriconazole for history of aspergillosis. \par \par S/p hospitalization at [facility] 10/3/19 - 11/1/19 for haplo (son) SCT. \par Upon admission, a TLC was placed in IR. Mr. Delong received IV fluid hydration, pain management, nutritional support, anxiolysis, antiemetics, and antiviral, antifungal, antibacterial, GI, PCP and VOD prophylaxis. When his ANC dropped below 1000, he was started on prophylactic Cefepime. Labs were monitored on a daily basis and he received transfusional support and electrolyte repletion as needed. \par \par While in patient, Mr. Delong was continued on his Voriconazole for history of aspergillosis. \par \par During admission, Mr. Delong had pancytopenia related to the high dose chemotherapy prep regimen. He also experienced neutropenic fevers. When he became febrile, blood and urine cultures were sent and a CXR was completed which were unremarkable. \par \par On 10/9/2019, following premedications, pt received 250 ml of allogeneic, related, haplo, fresh, mobilized HPC apheresis over 30-60 minutes. \par Cell counts as follows: \par \par Total MNCs (x10^8/kg) = 5.17 \par CD 34 cells (x10^6/kg) = 7.34 \par CD 3 Cells (x 10^7/kg) = 19.81 \par Cell viability (%) = 100 \par \par Pt tolerated infusion without any adverse reaction or complications. \par \par Engraftment was noted on 10/28/2019. Daily Zarxio was discontinued, as was Cefepime given negative cultures. Mr. Delong was discharged home to f/u at the [facility].

## 2020-07-23 LAB
C DIFF TOX GENS STL QL NAA+PROBE: NORMAL
CD3 AND CD33 ENRICHMENT: NORMAL
CDIFF BY PCR: NOT DETECTED
ENGRAFTMNET-POST: NORMAL

## 2020-07-25 ENCOUNTER — RX RENEWAL (OUTPATIENT)
Age: 60
End: 2020-07-25

## 2020-07-27 ENCOUNTER — RESULT REVIEW (OUTPATIENT)
Age: 60
End: 2020-07-27

## 2020-07-27 ENCOUNTER — APPOINTMENT (OUTPATIENT)
Dept: INFUSION THERAPY | Facility: HOSPITAL | Age: 60
End: 2020-07-27

## 2020-07-27 LAB
BASOPHILS # BLD AUTO: 0 K/UL — SIGNIFICANT CHANGE UP (ref 0–0.2)
BASOPHILS NFR BLD AUTO: 0 % — SIGNIFICANT CHANGE UP (ref 0–2)
EOSINOPHIL # BLD AUTO: 0 K/UL — SIGNIFICANT CHANGE UP (ref 0–0.5)
EOSINOPHIL NFR BLD AUTO: 0 % — SIGNIFICANT CHANGE UP (ref 0–6)
HCT VFR BLD CALC: 31.5 % — LOW (ref 39–50)
HGB BLD-MCNC: 10.2 G/DL — LOW (ref 13–17)
LYMPHOCYTES # BLD AUTO: 10.86 K/UL — HIGH (ref 1–3.3)
LYMPHOCYTES # BLD AUTO: 66 % — HIGH (ref 13–44)
MCHC RBC-ENTMCNC: 32.4 GM/DL — SIGNIFICANT CHANGE UP (ref 32–36)
MCHC RBC-ENTMCNC: 34.1 PG — HIGH (ref 27–34)
MCV RBC AUTO: 105.4 FL — HIGH (ref 80–100)
MONOCYTES # BLD AUTO: 1.32 K/UL — HIGH (ref 0–0.9)
MONOCYTES NFR BLD AUTO: 8 % — SIGNIFICANT CHANGE UP (ref 2–14)
NEUTROPHILS # BLD AUTO: 4.28 K/UL — SIGNIFICANT CHANGE UP (ref 1.8–7.4)
NEUTROPHILS NFR BLD AUTO: 26 % — LOW (ref 43–77)
NRBC # BLD: 0 /100 — SIGNIFICANT CHANGE UP (ref 0–0)
NRBC # BLD: SIGNIFICANT CHANGE UP /100 WBCS (ref 0–0)
PLAT MORPH BLD: NORMAL — SIGNIFICANT CHANGE UP
PLATELET # BLD AUTO: 85 K/UL — LOW (ref 150–400)
RBC # BLD: 2.99 M/UL — LOW (ref 4.2–5.8)
RBC # FLD: 17.7 % — HIGH (ref 10.3–14.5)
RBC BLD AUTO: SIGNIFICANT CHANGE UP
WBC # BLD: 16.45 K/UL — HIGH (ref 3.8–10.5)
WBC # FLD AUTO: 16.45 K/UL — HIGH (ref 3.8–10.5)

## 2020-07-28 ENCOUNTER — APPOINTMENT (OUTPATIENT)
Dept: INFUSION THERAPY | Facility: HOSPITAL | Age: 60
End: 2020-07-28

## 2020-07-28 ENCOUNTER — RESULT REVIEW (OUTPATIENT)
Age: 60
End: 2020-07-28

## 2020-07-28 DIAGNOSIS — R11.2 NAUSEA WITH VOMITING, UNSPECIFIED: ICD-10-CM

## 2020-07-28 DIAGNOSIS — Z51.11 ENCOUNTER FOR ANTINEOPLASTIC CHEMOTHERAPY: ICD-10-CM

## 2020-07-28 LAB
BASOPHILS # BLD AUTO: 0 K/UL — SIGNIFICANT CHANGE UP (ref 0–0.2)
BASOPHILS NFR BLD AUTO: 0 % — SIGNIFICANT CHANGE UP (ref 0–2)
EOSINOPHIL # BLD AUTO: 0.16 K/UL — SIGNIFICANT CHANGE UP (ref 0–0.5)
EOSINOPHIL NFR BLD AUTO: 1 % — SIGNIFICANT CHANGE UP (ref 0–6)
HCT VFR BLD CALC: 31.8 % — LOW (ref 39–50)
HGB BLD-MCNC: 9.8 G/DL — LOW (ref 13–17)
LYMPHOCYTES # BLD AUTO: 11.1 K/UL — HIGH (ref 1–3.3)
LYMPHOCYTES # BLD AUTO: 68 % — HIGH (ref 13–44)
MCHC RBC-ENTMCNC: 30.8 GM/DL — LOW (ref 32–36)
MCHC RBC-ENTMCNC: 33.3 PG — SIGNIFICANT CHANGE UP (ref 27–34)
MCV RBC AUTO: 108.2 FL — HIGH (ref 80–100)
MONOCYTES # BLD AUTO: 0.98 K/UL — HIGH (ref 0–0.9)
MONOCYTES NFR BLD AUTO: 6 % — SIGNIFICANT CHANGE UP (ref 2–14)
NEUTROPHILS # BLD AUTO: 4.08 K/UL — SIGNIFICANT CHANGE UP (ref 1.8–7.4)
NEUTROPHILS NFR BLD AUTO: 25 % — LOW (ref 43–77)
NRBC # BLD: 0 /100 — SIGNIFICANT CHANGE UP (ref 0–0)
NRBC # BLD: SIGNIFICANT CHANGE UP /100 WBCS (ref 0–0)
PLAT MORPH BLD: NORMAL — SIGNIFICANT CHANGE UP
PLATELET # BLD AUTO: 72 K/UL — LOW (ref 150–400)
RBC # BLD: 2.94 M/UL — LOW (ref 4.2–5.8)
RBC # FLD: 17.5 % — HIGH (ref 10.3–14.5)
RBC BLD AUTO: SIGNIFICANT CHANGE UP
WBC # BLD: 16.33 K/UL — HIGH (ref 3.8–10.5)
WBC # FLD AUTO: 16.33 K/UL — HIGH (ref 3.8–10.5)

## 2020-07-28 NOTE — CHART NOTE - NSCHARTNOTESELECT_GEN_ALL_CORE
[FreeTextEntry1] : Documented by Katja Webster acting as a scribe for Dr. Toribio Lopez on 07/28/2020.\par \par All medical record entries made by the Scribe were at my, Dr. Toribio Lopez's, direction and personally dictated by me on 07/28/2020. I have reviewed the chart and agree that the record accurately reflects my personal performance of the history, physical exam, assessment and plan. I have also personally directed, reviewed, and agree with the discharge instructions.  Event Note/unable to travel

## 2020-07-29 ENCOUNTER — APPOINTMENT (OUTPATIENT)
Dept: INFUSION THERAPY | Facility: HOSPITAL | Age: 60
End: 2020-07-29

## 2020-07-30 ENCOUNTER — APPOINTMENT (OUTPATIENT)
Dept: INFUSION THERAPY | Facility: HOSPITAL | Age: 60
End: 2020-07-30

## 2020-07-30 ENCOUNTER — RESULT REVIEW (OUTPATIENT)
Age: 60
End: 2020-07-30

## 2020-07-30 ENCOUNTER — APPOINTMENT (OUTPATIENT)
Dept: HEMATOLOGY ONCOLOGY | Facility: CLINIC | Age: 60
End: 2020-07-30
Payer: COMMERCIAL

## 2020-07-30 VITALS
WEIGHT: 197.95 LBS | OXYGEN SATURATION: 96 % | SYSTOLIC BLOOD PRESSURE: 99 MMHG | HEART RATE: 89 BPM | BODY MASS INDEX: 26.85 KG/M2 | TEMPERATURE: 99 F | DIASTOLIC BLOOD PRESSURE: 68 MMHG | RESPIRATION RATE: 16 BRPM

## 2020-07-30 LAB
BASOPHILS # BLD AUTO: 0.04 K/UL — SIGNIFICANT CHANGE UP (ref 0–0.2)
BASOPHILS NFR BLD AUTO: 0.4 % — SIGNIFICANT CHANGE UP (ref 0–2)
EOSINOPHIL # BLD AUTO: 0.04 K/UL — SIGNIFICANT CHANGE UP (ref 0–0.5)
EOSINOPHIL NFR BLD AUTO: 0.4 % — SIGNIFICANT CHANGE UP (ref 0–6)
HCT VFR BLD CALC: 32.3 % — LOW (ref 39–50)
HGB BLD-MCNC: 9.7 G/DL — LOW (ref 13–17)
IMM GRANULOCYTES NFR BLD AUTO: 0.4 % — SIGNIFICANT CHANGE UP (ref 0–1.5)
LYMPHOCYTES # BLD AUTO: 6.3 K/UL — HIGH (ref 1–3.3)
LYMPHOCYTES # BLD AUTO: 63.1 % — HIGH (ref 13–44)
MCHC RBC-ENTMCNC: 30 GM/DL — LOW (ref 32–36)
MCHC RBC-ENTMCNC: 33.4 PG — SIGNIFICANT CHANGE UP (ref 27–34)
MCV RBC AUTO: 111.4 FL — HIGH (ref 80–100)
MONOCYTES # BLD AUTO: 1.26 K/UL — HIGH (ref 0–0.9)
MONOCYTES NFR BLD AUTO: 12.6 % — SIGNIFICANT CHANGE UP (ref 2–14)
NEUTROPHILS # BLD AUTO: 2.31 K/UL — SIGNIFICANT CHANGE UP (ref 1.8–7.4)
NEUTROPHILS NFR BLD AUTO: 23.1 % — LOW (ref 43–77)
NRBC # BLD: 0 /100 WBCS — SIGNIFICANT CHANGE UP (ref 0–0)
PLATELET # BLD AUTO: 60 K/UL — LOW (ref 150–400)
RBC # BLD: 2.9 M/UL — LOW (ref 4.2–5.8)
RBC # FLD: 17.5 % — HIGH (ref 10.3–14.5)
WBC # BLD: 9.99 K/UL — SIGNIFICANT CHANGE UP (ref 3.8–10.5)
WBC # FLD AUTO: 9.99 K/UL — SIGNIFICANT CHANGE UP (ref 3.8–10.5)

## 2020-07-30 PROCEDURE — 99214 OFFICE O/P EST MOD 30 MIN: CPT

## 2020-07-31 ENCOUNTER — APPOINTMENT (OUTPATIENT)
Dept: INFUSION THERAPY | Facility: HOSPITAL | Age: 60
End: 2020-07-31

## 2020-07-31 LAB
ALBUMIN SERPL ELPH-MCNC: 3.8 G/DL
ALP BLD-CCNC: 89 U/L
ALT SERPL-CCNC: 10 U/L
ANION GAP SERPL CALC-SCNC: 12 MMOL/L
AST SERPL-CCNC: 15 U/L
BILIRUB SERPL-MCNC: 0.3 MG/DL
BUN SERPL-MCNC: 17 MG/DL
CALCIUM SERPL-MCNC: 8.8 MG/DL
CHLORIDE SERPL-SCNC: 104 MMOL/L
CO2 SERPL-SCNC: 24 MMOL/L
CREAT SERPL-MCNC: 1.28 MG/DL
GLUCOSE SERPL-MCNC: 116 MG/DL
LDH SERPL-CCNC: 196 U/L
MAGNESIUM SERPL-MCNC: 2.1 MG/DL
POTASSIUM SERPL-SCNC: 4.8 MMOL/L
PROT SERPL-MCNC: 5.5 G/DL
SODIUM SERPL-SCNC: 141 MMOL/L

## 2020-08-01 NOTE — ASSESSMENT
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 with the following comorbidities being managed:\par \par 1) AML\par S/p haplo (son) PSCT on 10/9/19\par 6/5/20 chimerism = 74.3% donor\par 6/23/20 chimerism = 67.7% donor (CD33 = 30% and CD3 = 100% donor)\par 6/25/20 BMbx: VINCE, normal male karyotype \par 7/8/20 chimerism = 57.7% donor (CD3 = 99% and CD33 = 10% donor)\par 7/21/20 chimerism = 59.3% donor (CD3 = 98.3% and CD33 = 7.3% donor)\par \par Current disease status: VINCE based on 6/25/20 BMbx and ongoing peripheral blood work\par \par In setting of dropping chimerism, SQ Vidaza 32mg/m2 daily x 5 days every 28 days started 7/27/20 \par DLI #1 scheduled for 8/13/20 \par \par 2) Heme\par ABO compatible transplant: pt and donor are both A pos\par Ongoing anemia and thrombocytopenia, poss poor graft function with dropping CD33 chimerism \par    7/30/20:   WBC 9.99   ANC 2.31   Hgb 9.7   PLT 60\par Continue multivitamin and folic acid daily\par \par Hx of recurrent DVT/PE\par Factor V Leiden reportedly runs in the family \par 11/21/19 Doppler US of RUE negative for DVT\par Xarelto on hold while pancytopenic, to resume when PLT consistently >100K\par LE edema poss r/t prednisone, consider Doppler US if edema persists - reportedly improved on 5/22/20 \par Increase lasix to 40 mg daily on 6/23/20. Scheduled for an ECHO on 6/24/20. Echo ordered by cardiologist.\par Continue Lasix 60mg daily per cardiologist.\par Doppler US of BLEs on 7/2/20 negative for DVT\par \par 3) ID\par Continue ppx:\par - Acyclovir 400 mg bid \par - Mepron 750m g bid\par - Voriconazole 200 mg bid - hx of aspergillosis \par Post transplant crowd and dietary restrictions reviewed - in light of covid-19, CDC recs reviewed and strict restrictions reinforced \par \par Fevers\par COVID and blood cx negative on 7/14/20\par 7/14/20 Urine cx ++ 50-00K CFU/mL staph aureus, pt asymptomatic\par Augmentin started 7/17/20, to complete course on 7/23/20 \par Instructed to check temp prior to taking PRN Tylenol as not to mask ongoing fevers\par \par CMV viremia\par CMV PCR peaked at 8,581 on 11/20/19\par Valcyte 450mg BID started 11/21/19. Decreased to QD as of 12/17/19. Held as of 2/14/20 for ongoing thrombocytopenia \par 7/21/20 CMV PCR not detected\par Continue to monitor PCR at ever visit \par \par 4) GVHD\par Skin 1  Liver 0   GI 0 - overall grade 1\par No signs of chronic GVHD at this time\par Off MMF as of 11/22/19 \par Tacrolimus discontinued on 6/15/20\par Prednisone discontinued on 6/15/20\par Reviewed signs/symptoms of GVHD (i.e. rash, mucositis, nausea/vomiting, diarrhea)\par \par aGVHD of skin \par Maculopapular pruritic rash of face, neck, and upper chest (BSA 19%) noted 1/21/20 in setting of tapering prednisone (for FTT) - max skin stage 1, overall grade 1\par 1/21/20: rash on face, neck, and chest (BSA 19%); skin 1, overall grade 1; prednisone increased to 10mg daily\par 2/14/20: rash on neck, chest, and bilateral upper arms (BSA 18%); skin 1, overall grade 1: prednisone increased to 20mg daily\par 2/21/20: rash on neck and chest (10% BSA); skin 1, overall grade 1. Continue prednisone 20mg daily\par 3/12/20: rash on neck and chest (10% BSA); skin 1, overall grade 1. Continue prednisone 15mg daily  \par 3/19/20: rash on neck and chest (10% BSA); skin 1, overall grade 1. Continue prednisone 15 mg daily\par 4/2/20: rash on neck and chest (10% BSA); skin 1, overall grade 1. Continue prednisone 15 mg daily\par 4/9/20: rash resolved, prednisone decreased to 10mg daily\par 4/17/20: rash on neck and chest (10% BSA); skin 1, overall grade 1. Continue prednisone 10mg daily and reinforced compliance with triamcinolone cream bid \par 5/8/20: waxing and waning pruritic erythema of upper back, unsure if GVHD. Continue prednisone 10mg daily at this time\par 5/14/20: mild erythema of neck and upper back ( 5% BSA); skin 1, overall grade 1. Continue prednisone 10 mg daily\par 5/22/20: faint pruritic erythema of upper chest and upper back (9% BSA); skin 1, overall grade 1. Prednisone decreased 5mg daily for drop in chimerism \par 6/5/20: monitor very faint erythema of chest and upper back. Continue triamcinolone 0.1% cream bid PRN rash \par Continue to monitor closely \par 6/23/20: Mild erythema of upper back and bilateral upper extremities.Continue triamcinolone 0.1% cream bid PRN rash \par 7/2/20: pruritic erythema of upper back, chest, and bilateral upper arms (36% BSA). Defer starting oral immunosuppression at this time given drop in chimerism. Start Claritin daily and Pepcid bid. Will attempt to attain insurance auth for Lidex 0.1% cream bid, continue to monitor closely\par 7/8/20: Pruritic erythema of chest and bilateral upper arms (18% BSA). Patient will pay out of pocket today for lidex cream. To be applied BID. Continue claritin and pepcid. \par 7/21/20: no rash \par 7/30/20: erythematous rash of chest (9% BSA), continue Lidex cream bid \par \par 5) GI\par Continue ppx:\par - Protonix 40 mg daily\par - Ursodiol 300 mg bid\par PRN Zofran and Reglan for nausea/vomiting\par \par 6) Cardiac\par HTN - continue metoprolol succinate 25mg daily\par Cardiomyopathy - Lasix to 60 mg daily as per cardiology - may trial decreasing to 40mg daily given borderline hypotension\par Continue Coreg 3.125mg bid as prescribed by cardiology \par ECHO completed on  6/24/20, ordered by cardiology\par \par 7) Other\par BPH - continue finasteride 5mg daily and tamsulosin 0.4mg daily \par Hypomagnesemia - likely 2/2 FK, continue MgOx 400mg tid\par Anxiety/Depression - continue Zoloft 50mg daily\par Insomnia - continue PRN Xanax 0.5-1mg qhs\par NAGA - Cr = 1.4 on 7/14/20, encouraged to drink 1L of fluid daily but caution given hx of CHF, continue to monitor closely \par \par 8) Plan/Dispo\par Pt educated regarding plan of care, all questions/concerns addressed\par Instructed to contact our office immediately with worsening fever or new/worsening sx\par RTC 8/4/20 for labs \par F/u with Dr. Gaytan and SUKHJINDERI on 8/13/20

## 2020-08-01 NOTE — HISTORY OF PRESENT ILLNESS
[de-identified] : Mr. Delong is a 60 y/o male, with a previously diagnosed acute myeloid leukemia. A bone marrow biopsy from 7/3 revealed Acute myeloid leukemia (AML). FISH - report shows a population of aberrant myeloid blasts. He is currently being treated by Dr. Coelho for high risk AML with HIDAC consolidation chemotherapy. He was referred by Dr. Coelho for an initial consultation of a Haplo- transplant. \par \par The patient has a history of multiple blood clots - factor V Leiden runs in the family. He was diagnosed with sleep apnea, but refuses to use CPAP. He is a former smoker, 40 years. He also has a past medical history of PE, HTN, cardiomyopathy. He recently underwent an amputation of the right first toe due to an infection.\par \par S/p hospitalization at [facility] 10/3/19 - 11/1/19 for haplo (son) SCT. \par Upon admission, a TLC was placed in IR. Mr. Delong received IV fluid hydration, pain management, nutritional support, anxiolysis, antiemetics, and antiviral,  antifungal, antibacterial, GI, PCP and VOD prophylaxis. When his ANC dropped  below 1000, he was started on prophylactic Cefepime. Labs were monitored on a daily basis and he received transfusional support and electrolyte repletion as needed. \par \par While in patient, Mr. Delong was continued on his Voriconazole for history of aspergillosis. \par \par S/p hospitalization at [facility]MT[facility] 10/3/19 - 11/1/19 for haplo (son) SCT. \par Upon admission, a TLC was placed in IR. Mr. Delong received IV fluid hydration, pain management, nutritional support, anxiolysis, antiemetics, and antiviral, antifungal, antibacterial, GI, PCP and VOD prophylaxis. When his ANC dropped below 1000, he was started on prophylactic Cefepime. Labs were monitored on a daily basis and he received transfusional support and electrolyte repletion as needed. \par \par While in patient, Mr. Delong was continued on his Voriconazole for history of aspergillosis. \par \par During admission, Mr. Delong had pancytopenia related to the high dose chemotherapy prep regimen. He also experienced neutropenic fevers. When he became febrile, blood and urine cultures were sent and a CXR was completed which were unremarkable. \par \par On 10/9/2019, following premedications, pt received 250 ml of allogeneic, related, haplo, fresh, mobilized HPC apheresis over 30-60 minutes. \par Cell counts as follows: \par \par Total MNCs (x10^8/kg) = 5.17 \par CD 34 cells (x10^6/kg) = 7.34 \par CD 3 Cells (x 10^7/kg) = 19.81 \par Cell viability (%) = 100 \par \par Pt tolerated infusion without any adverse reaction or complications. \par \par Engraftment was noted on 10/28/2019. Daily Zarxio was discontinued, as was Cefepime given negative cultures. Mr. Delong was discharged home to f/u at the [facility].  [de-identified] : On 7/30/20 visit, day +295 of SCT today. Vidaza started 7/27. Overall feeling well. Notes appetite has improved. Erythematous rash on chest. BLE edema improved. Compliant with post transplant meds and restrictions. Denies fever, chills, headache, dizziness, blurred vision, mucositis/odynophagia, chest pain, SOB, cough, nausea/vomiting, diarrhea/constipation, abdominal pain, dysuria, bleeding, pain\par

## 2020-08-01 NOTE — PHYSICAL EXAM
[Ambulatory and capable of all self care but unable to carry out any work activities] : Status 2- Ambulatory and capable of all self care but unable to carry out any work activities. Up and about more than 50% of waking hours [Normal] : affect appropriate [de-identified] : +1 BLE edema L>R, improved [de-identified] : erythematous rash on chest  [de-identified] : +BS; abdomen soft, non-distended, non-tender

## 2020-08-04 ENCOUNTER — LABORATORY RESULT (OUTPATIENT)
Age: 60
End: 2020-08-04

## 2020-08-04 ENCOUNTER — RESULT REVIEW (OUTPATIENT)
Age: 60
End: 2020-08-04

## 2020-08-04 ENCOUNTER — OUTPATIENT (OUTPATIENT)
Dept: OUTPATIENT SERVICES | Facility: HOSPITAL | Age: 60
LOS: 1 days | End: 2020-08-04

## 2020-08-04 ENCOUNTER — APPOINTMENT (OUTPATIENT)
Dept: HEMATOLOGY ONCOLOGY | Facility: CLINIC | Age: 60
End: 2020-08-04

## 2020-08-04 DIAGNOSIS — C92.00 ACUTE MYELOBLASTIC LEUKEMIA, NOT HAVING ACHIEVED REMISSION: ICD-10-CM

## 2020-08-04 LAB
BASOPHILS # BLD AUTO: 0.03 K/UL — SIGNIFICANT CHANGE UP (ref 0–0.2)
BASOPHILS NFR BLD AUTO: 0.3 % — SIGNIFICANT CHANGE UP (ref 0–2)
CMV DNA SPEC QL NAA+PROBE: NOT DETECTED IU/ML
EOSINOPHIL # BLD AUTO: 0.02 K/UL — SIGNIFICANT CHANGE UP (ref 0–0.5)
EOSINOPHIL NFR BLD AUTO: 0.2 % — SIGNIFICANT CHANGE UP (ref 0–6)
HCT VFR BLD CALC: 32.6 % — LOW (ref 39–50)
HGB BLD-MCNC: 9.7 G/DL — LOW (ref 13–17)
IMM GRANULOCYTES NFR BLD AUTO: 0.4 % — SIGNIFICANT CHANGE UP (ref 0–1.5)
LYMPHOCYTES # BLD AUTO: 5.75 K/UL — HIGH (ref 1–3.3)
LYMPHOCYTES # BLD AUTO: 62.9 % — HIGH (ref 13–44)
MCHC RBC-ENTMCNC: 29.8 GM/DL — LOW (ref 32–36)
MCHC RBC-ENTMCNC: 32.6 PG — SIGNIFICANT CHANGE UP (ref 27–34)
MCV RBC AUTO: 109.4 FL — HIGH (ref 80–100)
MONOCYTES # BLD AUTO: 0.62 K/UL — SIGNIFICANT CHANGE UP (ref 0–0.9)
MONOCYTES NFR BLD AUTO: 6.8 % — SIGNIFICANT CHANGE UP (ref 2–14)
NEUTROPHILS # BLD AUTO: 2.68 K/UL — SIGNIFICANT CHANGE UP (ref 1.8–7.4)
NEUTROPHILS NFR BLD AUTO: 29.4 % — LOW (ref 43–77)
NRBC # BLD: 0 /100 WBCS — SIGNIFICANT CHANGE UP (ref 0–0)
PLATELET # BLD AUTO: 50 K/UL — LOW (ref 150–400)
RBC # BLD: 2.98 M/UL — LOW (ref 4.2–5.8)
RBC # FLD: 17.6 % — HIGH (ref 10.3–14.5)
WBC # BLD: 9.14 K/UL — SIGNIFICANT CHANGE UP (ref 3.8–10.5)
WBC # FLD AUTO: 9.14 K/UL — SIGNIFICANT CHANGE UP (ref 3.8–10.5)

## 2020-08-06 LAB
CD3 AND CD33 ENRICHMENT: NORMAL
ENGRAFTMNET-POST: NORMAL

## 2020-08-08 ENCOUNTER — APPOINTMENT (OUTPATIENT)
Dept: DISASTER EMERGENCY | Facility: CLINIC | Age: 60
End: 2020-08-08

## 2020-08-09 LAB — SARS-COV-2 N GENE NPH QL NAA+PROBE: NOT DETECTED

## 2020-08-10 ENCOUNTER — RX RENEWAL (OUTPATIENT)
Age: 60
End: 2020-08-10

## 2020-08-13 ENCOUNTER — RESULT REVIEW (OUTPATIENT)
Age: 60
End: 2020-08-13

## 2020-08-13 ENCOUNTER — APPOINTMENT (OUTPATIENT)
Dept: HEMATOLOGY ONCOLOGY | Facility: CLINIC | Age: 60
End: 2020-08-13
Payer: COMMERCIAL

## 2020-08-13 ENCOUNTER — APPOINTMENT (OUTPATIENT)
Dept: INFUSION THERAPY | Facility: HOSPITAL | Age: 60
End: 2020-08-13

## 2020-08-13 LAB
BASOPHILS # BLD AUTO: 0.01 K/UL — SIGNIFICANT CHANGE UP (ref 0–0.2)
BASOPHILS NFR BLD AUTO: 0.1 % — SIGNIFICANT CHANGE UP (ref 0–2)
EOSINOPHIL # BLD AUTO: 0.03 K/UL — SIGNIFICANT CHANGE UP (ref 0–0.5)
EOSINOPHIL NFR BLD AUTO: 0.4 % — SIGNIFICANT CHANGE UP (ref 0–6)
HCT VFR BLD CALC: 33.3 % — LOW (ref 39–50)
HGB BLD-MCNC: 10 G/DL — LOW (ref 13–17)
IMM GRANULOCYTES NFR BLD AUTO: 0.2 % — SIGNIFICANT CHANGE UP (ref 0–1.5)
LYMPHOCYTES # BLD AUTO: 4.45 K/UL — HIGH (ref 1–3.3)
LYMPHOCYTES # BLD AUTO: 54.1 % — HIGH (ref 13–44)
MCHC RBC-ENTMCNC: 30 GM/DL — LOW (ref 32–36)
MCHC RBC-ENTMCNC: 32.8 PG — SIGNIFICANT CHANGE UP (ref 27–34)
MCV RBC AUTO: 109.2 FL — HIGH (ref 80–100)
MONOCYTES # BLD AUTO: 1.1 K/UL — HIGH (ref 0–0.9)
MONOCYTES NFR BLD AUTO: 13.4 % — SIGNIFICANT CHANGE UP (ref 2–14)
NEUTROPHILS # BLD AUTO: 2.61 K/UL — SIGNIFICANT CHANGE UP (ref 1.8–7.4)
NEUTROPHILS NFR BLD AUTO: 31.8 % — LOW (ref 43–77)
NRBC # BLD: 0 /100 WBCS — SIGNIFICANT CHANGE UP (ref 0–0)
PLATELET # BLD AUTO: 61 K/UL — LOW (ref 150–400)
RBC # BLD: 3.05 M/UL — LOW (ref 4.2–5.8)
RBC # FLD: 18.4 % — HIGH (ref 10.3–14.5)
WBC # BLD: 8.22 K/UL — SIGNIFICANT CHANGE UP (ref 3.8–10.5)
WBC # FLD AUTO: 8.22 K/UL — SIGNIFICANT CHANGE UP (ref 3.8–10.5)

## 2020-08-13 PROCEDURE — 99215 OFFICE O/P EST HI 40 MIN: CPT

## 2020-08-14 LAB
ALBUMIN SERPL ELPH-MCNC: 3.9 G/DL
ALP BLD-CCNC: 104 U/L
ALT SERPL-CCNC: 8 U/L
ANION GAP SERPL CALC-SCNC: 13 MMOL/L
AST SERPL-CCNC: 16 U/L
BILIRUB SERPL-MCNC: 0.3 MG/DL
BUN SERPL-MCNC: 14 MG/DL
CALCIUM SERPL-MCNC: 8.6 MG/DL
CHLORIDE SERPL-SCNC: 103 MMOL/L
CO2 SERPL-SCNC: 23 MMOL/L
CREAT SERPL-MCNC: 1.15 MG/DL
GLUCOSE SERPL-MCNC: 128 MG/DL
LDH SERPL-CCNC: 221 U/L
MAGNESIUM SERPL-MCNC: 2 MG/DL
POTASSIUM SERPL-SCNC: 4.1 MMOL/L
PROT SERPL-MCNC: 5.6 G/DL
SODIUM SERPL-SCNC: 139 MMOL/L

## 2020-08-17 NOTE — PHYSICAL EXAM
[Ambulatory and capable of all self care but unable to carry out any work activities] : Status 2- Ambulatory and capable of all self care but unable to carry out any work activities. Up and about more than 50% of waking hours [Normal] : affect appropriate [de-identified] : +BS; abdomen soft, non-distended, non-tender [de-identified] : +1 BLE edema L>R, improved [de-identified] : erythematous rash on chest ....8%

## 2020-08-17 NOTE — HISTORY OF PRESENT ILLNESS
[de-identified] : Mr. Delong is a 58 y/o male, with a previously diagnosed acute myeloid leukemia. A bone marrow biopsy from 7/3 revealed Acute myeloid leukemia (AML). FISH - report shows a population of aberrant myeloid blasts. He is currently being treated by Dr. Coelho for high risk AML with HIDAC consolidation chemotherapy. He was referred by Dr. Coelho for an initial consultation of a Haplo- transplant. \par \par The patient has a history of multiple blood clots - factor V Leiden runs in the family. He was diagnosed with sleep apnea, but refuses to use CPAP. He is a former smoker, 40 years. He also has a past medical history of PE, HTN, cardiomyopathy. He recently underwent an amputation of the right first toe due to an infection.\par \par S/p hospitalization at [facility] BMT[facility] 10/3/19 - 11/1/19 for haplo (son) SCT. \par Upon admission, a TLC was placed in IR. Mr. Delong received IV fluid hydration, pain management, nutritional support, anxiolysis, antiemetics, and antiviral,  antifungal, antibacterial, GI, PCP and VOD prophylaxis. When his ANC dropped  below 1000, he was started on prophylactic Cefepime. Labs were monitored on a daily basis and he received transfusional support and electrolyte repletion as needed. \par \par While in patient, Mr. Delong was continued on his Voriconazole for history of aspergillosis. \par \par S/p hospitalization at [facility] BMTU 10/3/19 - 11/1/19 for haplo (son) SCT. \par Upon admission, a TLC was placed in IR. Mr. Delong received IV fluid hydration, pain management, nutritional support, anxiolysis, antiemetics, and antiviral, antifungal, antibacterial, GI, PCP and VOD prophylaxis. When his ANC dropped below 1000, he was started on prophylactic Cefepime. Labs were monitored on a daily basis and he received transfusional support and electrolyte repletion as needed. \par \par While in patient, Mr. Delong was continued on his Voriconazole for history of aspergillosis. \par \par During admission, Mr. Delong had pancytopenia related to the high dose chemotherapy prep regimen. He also experienced neutropenic fevers. When he became febrile, blood and urine cultures were sent and a CXR was completed which were unremarkable. \par \par On 10/9/2019, following premedications, pt received 250 ml of allogeneic, related, haplo, fresh, mobilized HPC apheresis over 30-60 minutes. \par Cell counts as follows: \par \par Total MNCs (x10^8/kg) = 5.17 \par CD 34 cells (x10^6/kg) = 7.34 \par CD 3 Cells (x 10^7/kg) = 19.81 \par Cell viability (%) = 100 \par \par Pt tolerated infusion without any adverse reaction or complications. \par \par Engraftment was noted on 10/28/2019. Daily Zarxio was discontinued, as was Cefepime given negative cultures. Mr. Delong was discharged home to f/u at the [facility].  [de-identified] : Here for DLI #1...in good spirits.... Vidaza started 7/27. Overall feeling well. Notes appetite has improved. Erythematous rash on chest. BLE edema improved. Compliant with post transplant meds and restrictions. Denies fever, chills, headache, dizziness, blurred vision, mucositis/odynophagia, chest pain, SOB, cough, nausea/vomiting, diarrhea/constipation, abdominal pain, dysuria, bleeding, pain\par

## 2020-08-17 NOTE — ASSESSMENT
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 with the following comorbidities being managed:\par \par 1) AML\par S/p haplo (son) PSCT on 10/9/19\par 6/5/20 chimerism = 74.3% donor\par 6/23/20 chimerism = 67.7% donor (CD33 = 30% and CD3 = 100% donor)\par 6/25/20 BMbx: VINCE, normal male karyotype \par 7/8/20 chimerism = 57.7% donor (CD3 = 99% and CD33 = 10% donor)\par 7/21/20 chimerism = 59.3% donor (CD3 = 98.3% and CD33 = 7.3% donor)\par most recent 60%\par \par Current disease status: VINCE based on 6/25/20 BMbx and ongoing peripheral blood work\par \par In setting of dropping chimerism, SQ Vidaza 32mg/m2 daily x 5 days every 28 days started 7/27/20 \par DLI #1  for 8/13/20 \par \par 2) Heme\par ABO compatible transplant: pt and donor are both A pos\par Ongoing anemia and thrombocytopenia, poss poor graft function with dropping CD33 chimerism \par    7/30/20:   WBC 9.99   ANC 2.31   Hgb 9.7   PLT 60..labs from today reviewed\par Continue multivitamin and folic acid daily\par \par Hx of recurrent DVT/PE\par Factor V Leiden reportedly runs in the family \par 11/21/19 Doppler US of RUE negative for DVT\par Xarelto on hold while pancytopenic, to resume when PLT consistently >100K\par LE edema poss r/t prednisone, consider Doppler US if edema persists - reportedly improved on 5/22/20 \par Increase lasix to 40 mg daily on 6/23/20. Scheduled for an ECHO on 6/24/20. Echo ordered by cardiologist.\par Continue Lasix 60mg daily per cardiologist.\par Doppler US of BLEs on 7/2/20 negative for DVT\par \par 3) ID\par Continue ppx:\par - Acyclovir 400 mg bid \par - Mepron 750m g bid\par - Voriconazole 200 mg bid - hx of aspergillosis \par Post transplant crowd and dietary restrictions reviewed - in light of covid-19, [facility] recs reviewed and strict restrictions reinforced \par \par Fevers\par COVID and blood cx negative on 7/14/20\par 7/14/20 Urine cx ++ 50-00K CFU/mL staph aureus, pt asymptomatic\par Augmentin started 7/17/20, to complete course on 7/23/20 \par Instructed to check temp prior to taking PRN Tylenol as not to mask ongoing fevers\par \par CMV viremia\par CMV PCR peaked at 8,581 on 11/20/19\par Valcyte 450mg BID started 11/21/19. Decreased to QD as of 12/17/19. Held as of 2/14/20 for ongoing thrombocytopenia \par 7/21/20 CMV PCR not detected\par Continue to monitor PCR at ever visit \par \par 4) GVHD\par Skin 1  Liver 0   GI 0 - overall grade 1\par No signs of chronic GVHD at this time\par Off MMF as of 11/22/19 \par Tacrolimus discontinued on 6/15/20\par Prednisone discontinued on 6/15/20\par Reviewed signs/symptoms of GVHD (i.e. rash, mucositis, nausea/vomiting, diarrhea)\par \par aGVHD of skin \par Maculopapular pruritic rash of face, neck, and upper chest (BSA 19%) noted 1/21/20 in setting of tapering prednisone (for FTT) - max skin stage 1, overall grade 1\par 1/21/20: rash on face, neck, and chest (BSA 19%); skin 1, overall grade 1; prednisone increased to 10mg daily\par 2/14/20: rash on neck, chest, and bilateral upper arms (BSA 18%); skin 1, overall grade 1: prednisone increased to 20mg daily\par 2/21/20: rash on neck and chest (10% BSA); skin 1, overall grade 1. Continue prednisone 20mg daily\par 3/12/20: rash on neck and chest (10% BSA); skin 1, overall grade 1. Continue prednisone 15mg daily  \par 3/19/20: rash on neck and chest (10% BSA); skin 1, overall grade 1. Continue prednisone 15 mg daily\par 4/2/20: rash on neck and chest (10% BSA); skin 1, overall grade 1. Continue prednisone 15 mg daily\par 4/9/20: rash resolved, prednisone decreased to 10mg daily\par 4/17/20: rash on neck and chest (10% BSA); skin 1, overall grade 1. Continue prednisone 10mg daily and reinforced compliance with triamcinolone cream bid \par 5/8/20: waxing and waning pruritic erythema of upper back, unsure if GVHD. Continue prednisone 10mg daily at this time\par 5/14/20: mild erythema of neck and upper back ( 5% BSA); skin 1, overall grade 1. Continue prednisone 10 mg daily\par 5/22/20: faint pruritic erythema of upper chest and upper back (9% BSA); skin 1, overall grade 1. Prednisone decreased 5mg daily for drop in chimerism \par 6/5/20: monitor very faint erythema of chest and upper back. Continue triamcinolone 0.1% cream bid PRN rash \par Continue to monitor closely \par 6/23/20: Mild erythema of upper back and bilateral upper extremities.Continue triamcinolone 0.1% cream bid PRN rash \par 7/2/20: pruritic erythema of upper back, chest, and bilateral upper arms (36% BSA). Defer starting oral immunosuppression at this time given drop in chimerism. Start Claritin daily and Pepcid bid. Will attempt to attain insurance auth for Lidex 0.1% cream bid, continue to monitor closely\par 7/8/20: Pruritic erythema of chest and bilateral upper arms (18% BSA). Patient will pay out of pocket today for lidex cream. To be applied BID. Continue claritin and pepcid. \par 7/21/20: no rash \par 7/30/20: erythematous rash of chest (9% BSA), continue Lidex cream bid \par \par 5) GI\par Continue ppx:\par - Protonix 40 mg daily\par - Ursodiol 300 mg bid\par PRN Zofran and Reglan for nausea/vomiting\par \par 6) Cardiac\par HTN - continue metoprolol succinate 25mg daily\par Cardiomyopathy - Lasix to 60 mg daily as per cardiology - may trial decreasing to 40mg daily given borderline hypotension\par Continue Coreg 3.125mg bid as prescribed by cardiology \par ECHO completed on  6/24/20, ordered by cardiology\par \par 7) Other\par BPH - continue finasteride 5mg daily and tamsulosin 0.4mg daily \par Hypomagnesemia - likely 2/2 FK, continue MgOx 400mg tid\par Anxiety/Depression - continue Zoloft 50mg daily\par Insomnia - continue PRN Xanax 0.5-1mg qhs\par NAGA - Cr = 1.4 on 7/14/20, encouraged to drink 1L of fluid daily but caution given hx of CHF, continue to monitor closely \par \par 8) Plan/Dispo\par Pt educated regarding plan of care, all questions/concerns addressed\par Instructed to contact our office immediately with worsening fever or new/worsening sx\par F/u 10 days with next vidazza cycle..\par \par \par DLI #1 note\par plan was for 1 times 10 to the 7 cd 3 per kg....but given grade 1 gvhd of skin...dose was reduced\par 0.5 times 10 to the 7 cd 3 cells per kg were infused...50 ml...thawed, diluted hpc, apheresis product with pre meds\par no complications...adequate viability

## 2020-08-18 ENCOUNTER — OUTPATIENT (OUTPATIENT)
Dept: OUTPATIENT SERVICES | Facility: HOSPITAL | Age: 60
LOS: 1 days | Discharge: HOME | End: 2020-08-18

## 2020-08-18 DIAGNOSIS — C92.00 ACUTE MYELOBLASTIC LEUKEMIA, NOT HAVING ACHIEVED REMISSION: ICD-10-CM

## 2020-08-21 LAB — ENGRAFTMNET-POST: NORMAL

## 2020-08-22 LAB — CMV DNA SPEC QL NAA+PROBE: NOT DETECTED IU/ML

## 2020-08-24 ENCOUNTER — RESULT REVIEW (OUTPATIENT)
Age: 60
End: 2020-08-24

## 2020-08-24 ENCOUNTER — APPOINTMENT (OUTPATIENT)
Dept: INFUSION THERAPY | Facility: HOSPITAL | Age: 60
End: 2020-08-24

## 2020-08-24 LAB
BASOPHILS # BLD AUTO: 0 K/UL — SIGNIFICANT CHANGE UP (ref 0–0.2)
BASOPHILS NFR BLD AUTO: 0 % — SIGNIFICANT CHANGE UP (ref 0–2)
EOSINOPHIL # BLD AUTO: 0 K/UL — SIGNIFICANT CHANGE UP (ref 0–0.5)
EOSINOPHIL NFR BLD AUTO: 0 % — SIGNIFICANT CHANGE UP (ref 0–6)
HCT VFR BLD CALC: 33.6 % — LOW (ref 39–50)
HGB BLD-MCNC: 10.5 G/DL — LOW (ref 13–17)
LYMPHOCYTES # BLD AUTO: 46 % — HIGH (ref 13–44)
LYMPHOCYTES # BLD AUTO: 6.68 K/UL — HIGH (ref 1–3.3)
MCHC RBC-ENTMCNC: 31.3 GM/DL — LOW (ref 32–36)
MCHC RBC-ENTMCNC: 33.2 PG — SIGNIFICANT CHANGE UP (ref 27–34)
MCV RBC AUTO: 106.3 FL — HIGH (ref 80–100)
MONOCYTES # BLD AUTO: 2.03 K/UL — HIGH (ref 0–0.9)
MONOCYTES NFR BLD AUTO: 14 % — SIGNIFICANT CHANGE UP (ref 2–14)
NEUTROPHILS # BLD AUTO: 5.81 K/UL — SIGNIFICANT CHANGE UP (ref 1.8–7.4)
NEUTROPHILS NFR BLD AUTO: 40 % — LOW (ref 43–77)
NRBC # BLD: 0 /100 — SIGNIFICANT CHANGE UP (ref 0–0)
NRBC # BLD: SIGNIFICANT CHANGE UP /100 WBCS (ref 0–0)
PLAT MORPH BLD: NORMAL — SIGNIFICANT CHANGE UP
PLATELET # BLD AUTO: 93 K/UL — LOW (ref 150–400)
RBC # BLD: 3.16 M/UL — LOW (ref 4.2–5.8)
RBC # FLD: 17.9 % — HIGH (ref 10.3–14.5)
RBC BLD AUTO: SIGNIFICANT CHANGE UP
WBC # BLD: 14.52 K/UL — HIGH (ref 3.8–10.5)
WBC # FLD AUTO: 14.52 K/UL — HIGH (ref 3.8–10.5)

## 2020-08-25 ENCOUNTER — APPOINTMENT (OUTPATIENT)
Dept: INFUSION THERAPY | Facility: HOSPITAL | Age: 60
End: 2020-08-25

## 2020-08-25 ENCOUNTER — RX RENEWAL (OUTPATIENT)
Age: 60
End: 2020-08-25

## 2020-08-25 DIAGNOSIS — Z51.11 ENCOUNTER FOR ANTINEOPLASTIC CHEMOTHERAPY: ICD-10-CM

## 2020-08-26 ENCOUNTER — RX RENEWAL (OUTPATIENT)
Age: 60
End: 2020-08-26

## 2020-08-26 ENCOUNTER — APPOINTMENT (OUTPATIENT)
Dept: INFUSION THERAPY | Facility: HOSPITAL | Age: 60
End: 2020-08-26

## 2020-08-26 RX ORDER — AMOXICILLIN AND CLAVULANATE POTASSIUM 875; 125 MG/1; MG/1
875-125 TABLET, COATED ORAL
Qty: 14 | Refills: 0 | Status: DISCONTINUED | COMMUNITY
Start: 2020-07-17 | End: 2020-08-26

## 2020-08-27 ENCOUNTER — RESULT REVIEW (OUTPATIENT)
Age: 60
End: 2020-08-27

## 2020-08-27 ENCOUNTER — LABORATORY RESULT (OUTPATIENT)
Age: 60
End: 2020-08-27

## 2020-08-27 ENCOUNTER — APPOINTMENT (OUTPATIENT)
Dept: HEMATOLOGY ONCOLOGY | Facility: CLINIC | Age: 60
End: 2020-08-27
Payer: COMMERCIAL

## 2020-08-27 ENCOUNTER — APPOINTMENT (OUTPATIENT)
Dept: INFUSION THERAPY | Facility: HOSPITAL | Age: 60
End: 2020-08-27

## 2020-08-27 VITALS
OXYGEN SATURATION: 96 % | TEMPERATURE: 98.5 F | SYSTOLIC BLOOD PRESSURE: 106 MMHG | BODY MASS INDEX: 27.11 KG/M2 | WEIGHT: 200.18 LBS | RESPIRATION RATE: 16 BRPM | HEIGHT: 71.97 IN | DIASTOLIC BLOOD PRESSURE: 70 MMHG | HEART RATE: 99 BPM

## 2020-08-27 LAB
BASOPHILS # BLD AUTO: 0 K/UL — SIGNIFICANT CHANGE UP (ref 0–0.2)
BASOPHILS NFR BLD AUTO: 0 % — SIGNIFICANT CHANGE UP (ref 0–2)
EOSINOPHIL # BLD AUTO: 0 K/UL — SIGNIFICANT CHANGE UP (ref 0–0.5)
EOSINOPHIL NFR BLD AUTO: 0 % — SIGNIFICANT CHANGE UP (ref 0–6)
HCT VFR BLD CALC: 33.9 % — LOW (ref 39–50)
HGB BLD-MCNC: 10.5 G/DL — LOW (ref 13–17)
LYMPHOCYTES # BLD AUTO: 39 % — SIGNIFICANT CHANGE UP (ref 13–44)
LYMPHOCYTES # BLD AUTO: 5.16 K/UL — HIGH (ref 1–3.3)
MCHC RBC-ENTMCNC: 31 GM/DL — LOW (ref 32–36)
MCHC RBC-ENTMCNC: 32.6 PG — SIGNIFICANT CHANGE UP (ref 27–34)
MCV RBC AUTO: 105.3 FL — HIGH (ref 80–100)
MONOCYTES # BLD AUTO: 1.98 K/UL — HIGH (ref 0–0.9)
MONOCYTES NFR BLD AUTO: 15 % — HIGH (ref 2–14)
NEUTROPHILS # BLD AUTO: 6.08 K/UL — SIGNIFICANT CHANGE UP (ref 1.8–7.4)
NEUTROPHILS NFR BLD AUTO: 46 % — SIGNIFICANT CHANGE UP (ref 43–77)
NRBC # BLD: 1 /100 — HIGH (ref 0–0)
NRBC # BLD: SIGNIFICANT CHANGE UP /100 WBCS (ref 0–0)
PLAT MORPH BLD: NORMAL — SIGNIFICANT CHANGE UP
PLATELET # BLD AUTO: 95 K/UL — LOW (ref 150–400)
RBC # BLD: 3.22 M/UL — LOW (ref 4.2–5.8)
RBC # FLD: 17.9 % — HIGH (ref 10.3–14.5)
RBC BLD AUTO: SIGNIFICANT CHANGE UP
WBC # BLD: 13.22 K/UL — HIGH (ref 3.8–10.5)
WBC # FLD AUTO: 13.22 K/UL — HIGH (ref 3.8–10.5)

## 2020-08-27 PROCEDURE — 99215 OFFICE O/P EST HI 40 MIN: CPT

## 2020-08-27 RX ORDER — CETIRIZINE HYDROCHLORIDE 10 MG/1
10 TABLET, FILM COATED ORAL DAILY
Qty: 30 | Refills: 0 | Status: ACTIVE | COMMUNITY
Start: 2020-08-27

## 2020-08-27 RX ORDER — DEXTRAN 70/HYPROMELLOSE 0.1%-0.3%
0.1-0.3 DROPS OPHTHALMIC (EYE)
Qty: 1 | Refills: 6 | Status: DISCONTINUED | COMMUNITY
Start: 2020-04-09 | End: 2020-08-27

## 2020-08-28 ENCOUNTER — APPOINTMENT (OUTPATIENT)
Dept: INFUSION THERAPY | Facility: HOSPITAL | Age: 60
End: 2020-08-28

## 2020-08-28 NOTE — PHYSICAL EXAM
[Ambulatory and capable of all self care but unable to carry out any work activities] : Status 2- Ambulatory and capable of all self care but unable to carry out any work activities. Up and about more than 50% of waking hours [Normal] : full range of motion and no deformities appreciated [de-identified] : +1 BLE edema L>R, improved [de-identified] : +BS; abdomen soft, non-distended, non-tender [de-identified] : Mild erythematous rash of anterior chest and posterior back- BSA 9%

## 2020-08-28 NOTE — HISTORY OF PRESENT ILLNESS
[de-identified] : Mr. Delong is a 60 y/o male, with a previously diagnosed acute myeloid leukemia. A bone marrow biopsy from 7/3 revealed Acute myeloid leukemia (AML). FISH - report shows a population of aberrant myeloid blasts. He is currently being treated by Dr. Coelho for high risk AML with HIDAC consolidation chemotherapy. He was referred by Dr. Coelho for an initial consultation of a Haplo- transplant. \par \par The patient has a history of multiple blood clots - factor V Leiden runs in the family. He was diagnosed with sleep apnea, but refuses to use CPAP. He is a former smoker, 40 years. He also has a past medical history of PE, HTN, cardiomyopathy. He recently underwent an amputation of the right first toe due to an infection.\par \par S/p hospitalization at [facility] 10/3/19 - 11/1/19 for haplo (son) SCT. \par Upon admission, a TLC was placed in IR. Mr. Delong received IV fluid hydration, pain management, nutritional support, anxiolysis, antiemetics, and antiviral,  antifungal, antibacterial, GI, PCP and VOD prophylaxis. When his ANC dropped  below 1000, he was started on prophylactic Cefepime. Labs were monitored on a daily basis and he received transfusional support and electrolyte repletion as needed. \par \par While in patient, Mr. Delong was continued on his Voriconazole for history of aspergillosis. \par \par S/p hospitalization at [facility] 10/3/19 - 11/1/19 for haplo (son) SCT. \par Upon admission, a TLC was placed in IR. Mr. Delong received IV fluid hydration, pain management, nutritional support, anxiolysis, antiemetics, and antiviral, antifungal, antibacterial, GI, PCP and VOD prophylaxis. When his ANC dropped below 1000, he was started on prophylactic Cefepime. Labs were monitored on a daily basis and he received transfusional support and electrolyte repletion as needed. \par \par While in patient, Mr. Delong was continued on his Voriconazole for history of aspergillosis. \par \par During admission, Mr. Delong had pancytopenia related to the high dose chemotherapy prep regimen. He also experienced neutropenic fevers. When he became febrile, blood and urine cultures were sent and a CXR was completed which were unremarkable. \par \par On 10/9/2019, following premedications, pt received 250 ml of allogeneic, related, haplo, fresh, mobilized HPC apheresis over 30-60 minutes. \par Cell counts as follows: \par \par Total MNCs (x10^8/kg) = 5.17 \par CD 34 cells (x10^6/kg) = 7.34 \par CD 3 Cells (x 10^7/kg) = 19.81 \par Cell viability (%) = 100 \par \par Pt tolerated infusion without any adverse reaction or complications. \par \par Engraftment was noted on 10/28/2019. Daily Zarxio was discontinued, as was Cefepime given negative cultures. Mr. Delong was discharged home to f/u at the [facility].  [de-identified] : Here for DLI #1...in good spirits.... Vidaza started 7/27. Overall feeling well. Notes appetite has improved. Erythematous rash on chest. BLE edema improved. Compliant with post transplant meds and restrictions. Denies fever, chills, headache, dizziness, blurred vision, mucositis/odynophagia, chest pain, SOB, cough, nausea/vomiting, diarrhea/constipation, abdominal pain, dysuria, bleeding, pain\par \par On 8/27/20 visit, day + 323 of Allo PBSCT. Overall patient is well and offers no acute concerns. Receiving cycle two of vidaza this week and today is day 4/5. Mild erythematous rash of anterior chest and posterior back. Complains of pruritus of chest.  Denies fever, chills, nausea, vomiting, diarrhea, mouth sores or dysuria. Denies SOB or chest pain. Mild bilateral lower extremity edema. Remains compliant with post transplant medications. Spoke with girlfriend Ashlyn on telephone today during visit.

## 2020-08-28 NOTE — REVIEW OF SYSTEMS
[Negative] : Allergic/Immunologic [Chest Pain] : no chest pain [Leg Claudication] : no intermittent leg claudication [Palpitations] : no palpitations [Lower Ext Edema] : lower extremity edema [Skin Rash] : skin rash [Skin Wound] : no skin wound [FreeTextEntry5] : mild edema of bilateral lower extremities [de-identified] : Mild erythematous rash of anterior chest and posterior back. Pruritus

## 2020-08-28 NOTE — ASSESSMENT
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 with the following comorbidities being managed:\par \par 1) AML\par S/p haplo (son) PSCT on 10/9/19\par 6/5/20 chimerism = 74.3% donor\par 6/23/20 chimerism = 67.7% donor (CD33 = 30% and CD3 = 100% donor)\par 6/25/20 BMbx: VINCE, normal male karyotype \par 7/8/20 chimerism = 57.7% donor (CD3 = 99% and CD33 = 10% donor)\par 7/21/20 chimerism = 59.3% donor (CD3 = 98.3% and CD33 = 7.3% donor)\par 8/13/20 chimerism= 56.7 % donor.\par \par Current disease status: VINCE based on 6/25/20 BMbx and ongoing peripheral blood work\par \par In setting of dropping chimerism, SQ Vidaza 32mg/m2 daily x 5 days every 28 days started 7/27/20 .\par DLI #1  completed on  8/13/20. \par Cycle two of vidaza on 8/24/20 - 8/28/20. Continue vidaza every 28 days.\par \par 2) Heme\par ABO compatible transplant: pt and donor are both A pos\par Ongoing anemia and thrombocytopenia, poss poor graft function with dropping CD33 chimerism \par    8/27/20:   WBC 13.2  ANC 6.08  Hgb 10.5  PLT 95 \par Continue multivitamin and folic acid daily\par \par Hx of recurrent DVT/PE\par Factor V Leiden reportedly runs in the family \par 11/21/19 Doppler US of RUE negative for DVT\par Xarelto on hold while pancytopenic, to resume when PLT consistently >100K\par LE edema poss r/t prednisone, consider Doppler US if edema persists - reportedly improved on 5/22/20 \par ECHO completed on 6/24/20. Echo ordered by cardiologist.\par Continue Lasix 40 mg daily per cardiologist.\par Doppler US of BLEs on 7/2/20 negative for DVT\par \par 3) ID\par Continue ppx:\par - Acyclovir 400 mg bid \par - Mepron 750m g bid\par - Voriconazole 200 mg bid - hx of aspergillosis \par Post transplant crowd and dietary restrictions reviewed - in light of covid-19, [facility] recs reviewed and strict restrictions reinforced \par \par Fevers\par COVID and blood cx negative on 7/14/20\par 7/14/20 Urine cx ++ 50-00K CFU/mL staph aureus, pt asymptomatic\par Augmentin started 7/17/20, to complete course on 7/23/20 \par Instructed to check temp prior to taking PRN Tylenol as not to mask ongoing fevers\par RESOLVED\par \par CMV viremia\par CMV PCR peaked at 8,581 on 11/20/19\par Valcyte 450mg BID started 11/21/19. Decreased to QD as of 12/17/19. Held as of 2/14/20 for ongoing thrombocytopenia \par 7/21/20 CMV PCR not detected\par Continue to monitor PCR at ever visit \par \par 4) GVHD\par Skin 1  Liver 0   GI 0 - overall grade 1\par No signs of chronic GVHD at this time\par Off MMF as of 11/22/19 \par Tacrolimus discontinued on 6/15/20\par Prednisone discontinued on 6/15/20\par Reviewed signs/symptoms of GVHD (i.e. rash, mucositis, nausea/vomiting, diarrhea)\par \par aGVHD of skin \par Maculopapular pruritic rash of face, neck, and upper chest (BSA 19%) noted 1/21/20 in setting of tapering prednisone (for FTT) - max skin stage 1, overall grade 1\par 1/21/20: rash on face, neck, and chest (BSA 19%); skin 1, overall grade 1; prednisone increased to 10mg daily\par 2/14/20: rash on neck, chest, and bilateral upper arms (BSA 18%); skin 1, overall grade 1: prednisone increased to 20mg daily\par 2/21/20: rash on neck and chest (10% BSA); skin 1, overall grade 1. Continue prednisone 20mg daily\par 3/12/20: rash on neck and chest (10% BSA); skin 1, overall grade 1. Continue prednisone 15mg daily  \par 3/19/20: rash on neck and chest (10% BSA); skin 1, overall grade 1. Continue prednisone 15 mg daily\par 4/2/20: rash on neck and chest (10% BSA); skin 1, overall grade 1. Continue prednisone 15 mg daily\par 4/9/20: rash resolved, prednisone decreased to 10mg daily\par 4/17/20: rash on neck and chest (10% BSA); skin 1, overall grade 1. Continue prednisone 10mg daily and reinforced compliance with triamcinolone cream bid \par 5/8/20: waxing and waning pruritic erythema of upper back, unsure if GVHD. Continue prednisone 10mg daily at this time\par 5/14/20: mild erythema of neck and upper back ( 5% BSA); skin 1, overall grade 1. Continue prednisone 10 mg daily\par 5/22/20: faint pruritic erythema of upper chest and upper back (9% BSA); skin 1, overall grade 1. Prednisone decreased 5mg daily for drop in chimerism \par 6/5/20: monitor very faint erythema of chest and upper back. Continue triamcinolone 0.1% cream bid PRN rash \par Continue to monitor closely \par 6/23/20: Mild erythema of upper back and bilateral upper extremities.Continue triamcinolone 0.1% cream bid PRN rash \par 7/2/20: pruritic erythema of upper back, chest, and bilateral upper arms (36% BSA). Defer starting oral immunosuppression at this time given drop in chimerism. Start Claritin daily and Pepcid bid. Will attempt to attain insurance auth for Lidex 0.1% cream bid, continue to monitor closely\par 7/8/20: Pruritic erythema of chest and bilateral upper arms (18% BSA). Patient will pay out of pocket today for lidex cream. To be applied BID. Continue claritin and pepcid. \par 7/21/20: no rash \par 7/30/20: erythematous rash of chest (9% BSA), continue Lidex cream bid \par 8/27/20: Erythematous rash of chest and back ( 9% BSA). Continue lidex cream BID\par \par 5) GI\par Continue ppx:\par - Protonix 40 mg daily\par - Ursodiol 300 mg bid\par PRN Zofran and Reglan for nausea/vomiting\par \par 6) Cardiac\par HTN - continue metoprolol succinate 25mg daily\par Cardiomyopathy - continue lasix 40 mg daily\par Continue Coreg 3.125 mg bid as prescribed by cardiology \par ECHO completed on  6/24/20, ordered by cardiology\par \par 7) Other\par BPH - continue finasteride 5mg daily and tamsulosin 0.4mg daily \par Hypomagnesemia - likely 2/2 FK, continue MgOx 400mg BID\par Anxiety/Depression - continue Zoloft 50mg daily\par Insomnia - continue PRN Xanax 0.5-1mg qhs\par NAGA - Cr = 1.4 on 7/14/20, encouraged to drink 1L of fluid daily but caution given hx of CHF, continue to monitor closely \par \par 8) Plan/Dispo\par Pt educated regarding plan of care, all questions/concerns addressed\par Instructed to contact our office immediately with worsening fever or new/worsening sx\par Follow up appointment in two weeks\par \par \par

## 2020-09-07 ENCOUNTER — RX RENEWAL (OUTPATIENT)
Age: 60
End: 2020-09-07

## 2020-09-08 ENCOUNTER — LABORATORY RESULT (OUTPATIENT)
Age: 60
End: 2020-09-08

## 2020-09-08 ENCOUNTER — RESULT REVIEW (OUTPATIENT)
Age: 60
End: 2020-09-08

## 2020-09-08 ENCOUNTER — APPOINTMENT (OUTPATIENT)
Dept: HEMATOLOGY ONCOLOGY | Facility: CLINIC | Age: 60
End: 2020-09-08
Payer: COMMERCIAL

## 2020-09-08 VITALS
SYSTOLIC BLOOD PRESSURE: 107 MMHG | TEMPERATURE: 98.3 F | OXYGEN SATURATION: 98 % | RESPIRATION RATE: 16 BRPM | BODY MASS INDEX: 27.17 KG/M2 | HEART RATE: 96 BPM | DIASTOLIC BLOOD PRESSURE: 72 MMHG | WEIGHT: 200.62 LBS | HEIGHT: 71.97 IN

## 2020-09-08 DIAGNOSIS — G47.00 INSOMNIA, UNSPECIFIED: ICD-10-CM

## 2020-09-08 DIAGNOSIS — I95.89 OTHER HYPOTENSION: ICD-10-CM

## 2020-09-08 DIAGNOSIS — E86.1 OTHER HYPOTENSION: ICD-10-CM

## 2020-09-08 LAB
BASOPHILS # BLD AUTO: 0.07 K/UL — SIGNIFICANT CHANGE UP (ref 0–0.2)
BASOPHILS NFR BLD AUTO: 0.7 % — SIGNIFICANT CHANGE UP (ref 0–2)
EOSINOPHIL # BLD AUTO: 0.39 K/UL — SIGNIFICANT CHANGE UP (ref 0–0.5)
EOSINOPHIL NFR BLD AUTO: 3.8 % — SIGNIFICANT CHANGE UP (ref 0–6)
HCT VFR BLD CALC: 32.7 % — LOW (ref 39–50)
HGB BLD-MCNC: 10.2 G/DL — LOW (ref 13–17)
IMM GRANULOCYTES NFR BLD AUTO: 0.6 % — SIGNIFICANT CHANGE UP (ref 0–1.5)
LYMPHOCYTES # BLD AUTO: 4.61 K/UL — HIGH (ref 1–3.3)
LYMPHOCYTES # BLD AUTO: 44.6 % — HIGH (ref 13–44)
MCHC RBC-ENTMCNC: 31.2 GM/DL — LOW (ref 32–36)
MCHC RBC-ENTMCNC: 32.6 PG — SIGNIFICANT CHANGE UP (ref 27–34)
MCV RBC AUTO: 104.5 FL — HIGH (ref 80–100)
MONOCYTES # BLD AUTO: 1.63 K/UL — HIGH (ref 0–0.9)
MONOCYTES NFR BLD AUTO: 15.8 % — HIGH (ref 2–14)
NEUTROPHILS # BLD AUTO: 3.58 K/UL — SIGNIFICANT CHANGE UP (ref 1.8–7.4)
NEUTROPHILS NFR BLD AUTO: 34.5 % — LOW (ref 43–77)
NRBC # BLD: 0 /100 WBCS — SIGNIFICANT CHANGE UP (ref 0–0)
PLATELET # BLD AUTO: 71 K/UL — LOW (ref 150–400)
RBC # BLD: 3.13 M/UL — LOW (ref 4.2–5.8)
RBC # FLD: 18.6 % — HIGH (ref 10.3–14.5)
WBC # BLD: 10.34 K/UL — SIGNIFICANT CHANGE UP (ref 3.8–10.5)
WBC # FLD AUTO: 10.34 K/UL — SIGNIFICANT CHANGE UP (ref 3.8–10.5)

## 2020-09-08 PROCEDURE — 99214 OFFICE O/P EST MOD 30 MIN: CPT

## 2020-09-11 PROBLEM — I95.89 HYPOTENSION DUE TO HYPOVOLEMIA: Status: RESOLVED | Noted: 2019-11-22 | Resolved: 2020-09-11

## 2020-09-11 PROBLEM — G47.00 INSOMNIA: Status: ACTIVE | Noted: 2020-01-21

## 2020-09-12 RX ORDER — OMEGA-3/DHA/EPA/FISH OIL 300-1000MG
400 CAPSULE ORAL
Qty: 180 | Refills: 3 | Status: DISCONTINUED | COMMUNITY
Start: 2019-11-07 | End: 2020-09-12

## 2020-09-12 NOTE — HISTORY OF PRESENT ILLNESS
[de-identified] : Mr. Delong is a 58 y/o male, with a previously diagnosed acute myeloid leukemia. A bone marrow biopsy from 7/3 revealed Acute myeloid leukemia (AML). FISH - report shows a population of aberrant myeloid blasts. He is currently being treated by Dr. Coelho for high risk AML with HIDAC consolidation chemotherapy. He was referred by Dr. Coelho for an initial consultation of a Haplo- transplant. \par \par The patient has a history of multiple blood clots - factor V Leiden runs in the family. He was diagnosed with sleep apnea, but refuses to use CPAP. He is a former smoker, 40 years. He also has a past medical history of PE, HTN, cardiomyopathy. He recently underwent an amputation of the right first toe due to an infection.\par \par S/p hospitalization at [facility] 10/3/19 - 11/1/19 for haplo (son) SCT. \par Upon admission, a TLC was placed in IR. Mr. Delong received IV fluid hydration, pain management, nutritional support, anxiolysis, antiemetics, and antiviral,  antifungal, antibacterial, GI, PCP and VOD prophylaxis. When his ANC dropped  below 1000, he was started on prophylactic Cefepime. Labs were monitored on a daily basis and he received transfusional support and electrolyte repletion as needed. \par \par While in patient, Mr. Delong was continued on his Voriconazole for history of aspergillosis. \par \par S/p hospitalization at [facility] 10/3/19 - 11/1/19 for haplo (son) SCT. \par Upon admission, a TLC was placed in IR. Mr. Delong received IV fluid hydration, pain management, nutritional support, anxiolysis, antiemetics, and antiviral, antifungal, antibacterial, GI, PCP and VOD prophylaxis. When his ANC dropped below 1000, he was started on prophylactic Cefepime. Labs were monitored on a daily basis and he received transfusional support and electrolyte repletion as needed. \par \par While in patient, Mr. Delong was continued on his Voriconazole for history of aspergillosis. \par \par During admission, Mr. Delong had pancytopenia related to the high dose chemotherapy prep regimen. He also experienced neutropenic fevers. When he became febrile, blood and urine cultures were sent and a CXR was completed which were unremarkable. \par \par On 10/9/2019, following premedications, pt received 250 ml of allogeneic, related, haplo, fresh, mobilized HPC apheresis over 30-60 minutes. \par Cell counts as follows: \par \par Total MNCs (x10^8/kg) = 5.17 \par CD 34 cells (x10^6/kg) = 7.34 \par CD 3 Cells (x 10^7/kg) = 19.81 \par Cell viability (%) = 100 \par \par Pt tolerated infusion without any adverse reaction or complications. \par \par Engraftment was noted on 10/28/2019. Daily Zarxio was discontinued, as was Cefepime given negative cultures. Mr. Delong was discharged home to f/u at the [facility].  [de-identified] : On 9/8/20 visit, day +26 of DLI #1 today. Overall feeling well. Occasionally feels off balance but no falls, stable. Adequate PO intake and hydration. Notes SOB with lying supine over the past couple days, currently taking Lasix 40mg daily. Ongoing intermittently pruritic GVHD rash of BUEs, L>R likely because he rests his arm out the window while driving. Stable BLE edema. Compliant with post transplant meds and restrictions. Denies fever, chills, headache, dizziness, blurred vision, mucositis/odynophagia, chest pain, cough, nausea/vomiting, diarrhea/constipation, abdominal pain, dysuria, bleeding, pain\par

## 2020-09-12 NOTE — REVIEW OF SYSTEMS
[Palpitations] : no palpitations [Chest Pain] : no chest pain [Leg Claudication] : no intermittent leg claudication [Lower Ext Edema] : lower extremity edema [Skin Wound] : no skin wound [Skin Rash] : skin rash [FreeTextEntry5] : mild edema of bilateral lower extremities [de-identified] : Mild erythematous rash of anterior chest and posterior back. Pruritus  [Negative] : Allergic/Immunologic

## 2020-09-12 NOTE — PHYSICAL EXAM
[Ambulatory and capable of all self care but unable to carry out any work activities] : Status 2- Ambulatory and capable of all self care but unable to carry out any work activities. Up and about more than 50% of waking hours [Normal] : affect appropriate [de-identified] : +2 BLE edema L>R [de-identified] : +BS; abdomen soft, non-distended, non-tender [de-identified] : erythematous rash of BUEs L>R

## 2020-09-12 NOTE — REASON FOR VISIT
[Follow-Up Visit] : a follow-up visit for [Acute Myeloid Leukemia] : acute myeloid leukemia [Other: _____] : [unfilled] [FreeTextEntry2] : AML s/p haplo (son) PBSCT on 10/9/19. S/p DLI #1 on 8/13/20

## 2020-09-12 NOTE — ASSESSMENT
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 with the following comorbidities being managed:\par \par 1) AML\par S/p haplo (son) PSCT on 10/9/19\par 6/5/20 chimerism = 74.3% donor\par 6/23/20 chimerism = 67.7% donor (CD33 = 30% and CD3 = 100% donor)\par 6/25/20 BMbx: VINCE, normal male karyotype \par 7/8/20 chimerism = 57.7% donor (CD3 = 99% and CD33 = 10% donor)\par 7/21/20 chimerism = 59.3% donor (CD3 = 98.3% and CD33 = 7.3% donor)\par 8/13/20 chimerism = 56.7 % donor\par \par Current disease status: VINCE based on 6/25/20 BMbx and ongoing peripheral blood work\par \par In setting of dropping chimerism, SQ Vidaza 32mg/m2 daily x 5 days every 28 days started 7/27/20 \par    Cycle 2 - 8/24-8/28 \par    Cycle 3 - 9/21-9/25\par S/p DLI #1 on 8/13/20\par \par 2) Heme\par ABO compatible transplant: pt and donor are both A pos\par Ongoing anemia and thrombocytopenia, poss poor graft function with dropping CD33 chimerism \par    9/8/20:   WBC 10.34   ANC 3.58   Hgb 10.2   PLT 71\par Continue multivitamin and folic acid daily\par \par Hx of recurrent DVT/PE\par Factor V Leiden reportedly runs in the family \par 11/21/19 Doppler US of RUE negative for DVT\par Doppler US of BLEs on 7/2/20 negative for DVT\par Xarelto on hold while pancytopenic, to resume when PLT consistently >100K\par \par 3) ID\par Continue ppx:\par - Acyclovir 400mg bid \par - Mepron 750mg bid\par - Voriconazole 200 mg bid - hx of aspergillosis \par Post transplant crowd and dietary restrictions reviewed - in light of covid-19, CDC recs reviewed and strict restrictions reinforced \par \par CMV viremia\par CMV PCR peaked at 8,581 on 11/20/19\par Valcyte 450mg BID started 11/21/19. Decreased to QD as of 12/17/19. D/c'd 2/14/20 for ongoing thrombocytopenia \par 8/27/20 CMV PCR not detected \par Continue to monitor PCR at ever visit \par \par 4) GVHD\par Skin 1  Liver 0   GI 0 - overall grade 1\par No signs of chronic GVHD at this time\par Off MMF as of 11/22/19 \par Tacrolimus discontinued on 6/15/20\par Prednisone discontinued on 6/15/20\par Reviewed signs/symptoms of GVHD (i.e. rash, mucositis, nausea/vomiting, diarrhea)\par \par aGVHD of skin \par Maculopapular pruritic rash of face, neck, and upper chest (BSA 19%) noted 1/21/20 in setting of tapering prednisone (for FTT) - max skin stage 1, overall grade 1\par 1/21/20: rash on face, neck, and chest (BSA 19%); skin 1, overall grade 1; prednisone increased to 10mg daily\par 2/14/20: rash on neck, chest, and bilateral upper arms (BSA 18%); skin 1, overall grade 1: prednisone increased to 20mg daily\par 2/21/20: rash on neck and chest (10% BSA); skin 1, overall grade 1. Continue prednisone 20mg daily\par 3/12/20: rash on neck and chest (10% BSA); skin 1, overall grade 1. Continue prednisone 15mg daily  \par 3/19/20: rash on neck and chest (10% BSA); skin 1, overall grade 1. Continue prednisone 15 mg daily\par 4/2/20: rash on neck and chest (10% BSA); skin 1, overall grade 1. Continue prednisone 15 mg daily\par 4/9/20: rash resolved, prednisone decreased to 10mg daily\par 4/17/20: rash on neck and chest (10% BSA); skin 1, overall grade 1. Continue prednisone 10mg daily and reinforced compliance with triamcinolone cream bid \par 5/8/20: waxing and waning pruritic erythema of upper back, unsure if GVHD. Continue prednisone 10mg daily at this time\par 5/14/20: mild erythema of neck and upper back ( 5% BSA); skin 1, overall grade 1. Continue prednisone 10 mg daily\par 5/22/20: faint pruritic erythema of upper chest and upper back (9% BSA); skin 1, overall grade 1. Prednisone decreased 5mg daily for drop in chimerism \par 6/5/20: monitor very faint erythema of chest and upper back. Continue triamcinolone 0.1% cream bid PRN rash \par Continue to monitor closely \par 6/23/20: Mild erythema of upper back and bilateral upper extremities.Continue triamcinolone 0.1% cream bid PRN rash \par 7/2/20: pruritic erythema of upper back, chest, and bilateral upper arms (36% BSA). Defer starting oral immunosuppression at this time given drop in chimerism. Start Claritin daily and Pepcid bid. Will attempt to attain insurance auth for Lidex 0.1% cream bid, continue to monitor closely\par 7/8/20: Pruritic erythema of chest and bilateral upper arms (18% BSA). Patient will pay out of pocket today for lidex cream. To be applied BID. Continue claritin and pepcid. \par 7/21/20: no rash \par 7/30/20: erythematous rash of chest (9% BSA), continue Lidex cream bid \par 8/27/20: Erythematous rash of chest and back ( 9% BSA). Continue lidex cream BID\par 9/8/20: erythematous rash of BUEs (18% BSA), continue Lidex cream bid \par \par 5) GI\par Continue ppx:\par - Protonix 40 mg daily\par - Ursodiol 300 mg bid\par PRN Zofran and Reglan for nausea/vomiting\par \par 6) Cardiac\par HTN \par Continue metoprolol succinate 25mg daily\par \par Cardiomyopathy\par ECHO completed on  6/24/20, ordered by cardiology\par Continue Coreg 3.125mg bid \par Increase Lasix to 40mg bid given SOB with lying supine, instructed to contact cardiologist if symptoms persist \par \par 7) Other\par BPH - continue finasteride 5mg daily and tamsulosin 0.4mg daily \par Anxiety/Depression - continue Zoloft 50mg daily\par Insomnia - continue PRN Xanax 0.5-1mg qhs\par \par 8) Plan/Dispo\par Pt educated regarding plan of care, all questions/concerns addressed\par F/u with Dr. Gaytan on 9/24/20

## 2020-09-16 ENCOUNTER — OUTPATIENT (OUTPATIENT)
Dept: OUTPATIENT SERVICES | Facility: HOSPITAL | Age: 60
LOS: 1 days | Discharge: HOME | End: 2020-09-16

## 2020-09-16 DIAGNOSIS — C92.00 ACUTE MYELOBLASTIC LEUKEMIA, NOT HAVING ACHIEVED REMISSION: ICD-10-CM

## 2020-09-18 ENCOUNTER — APPOINTMENT (OUTPATIENT)
Dept: DISASTER EMERGENCY | Facility: CLINIC | Age: 60
End: 2020-09-18

## 2020-09-19 LAB — SARS-COV-2 N GENE NPH QL NAA+PROBE: NOT DETECTED

## 2020-09-21 ENCOUNTER — RESULT REVIEW (OUTPATIENT)
Age: 60
End: 2020-09-21

## 2020-09-21 ENCOUNTER — LABORATORY RESULT (OUTPATIENT)
Age: 60
End: 2020-09-21

## 2020-09-21 ENCOUNTER — APPOINTMENT (OUTPATIENT)
Dept: INFUSION THERAPY | Facility: HOSPITAL | Age: 60
End: 2020-09-21

## 2020-09-21 LAB
BASOPHILS # BLD AUTO: 0.03 K/UL — SIGNIFICANT CHANGE UP (ref 0–0.2)
BASOPHILS NFR BLD AUTO: 0.3 % — SIGNIFICANT CHANGE UP (ref 0–2)
EOSINOPHIL # BLD AUTO: 0.22 K/UL — SIGNIFICANT CHANGE UP (ref 0–0.5)
EOSINOPHIL NFR BLD AUTO: 2.1 % — SIGNIFICANT CHANGE UP (ref 0–6)
HCT VFR BLD CALC: 35.9 % — LOW (ref 39–50)
HGB BLD-MCNC: 10.9 G/DL — LOW (ref 13–17)
IMM GRANULOCYTES NFR BLD AUTO: 0.7 % — SIGNIFICANT CHANGE UP (ref 0–1.5)
LYMPHOCYTES # BLD AUTO: 4.47 K/UL — HIGH (ref 1–3.3)
LYMPHOCYTES # BLD AUTO: 42.6 % — SIGNIFICANT CHANGE UP (ref 13–44)
MCHC RBC-ENTMCNC: 30.4 G/DL — LOW (ref 32–36)
MCHC RBC-ENTMCNC: 31.8 PG — SIGNIFICANT CHANGE UP (ref 27–34)
MCV RBC AUTO: 104.7 FL — HIGH (ref 80–100)
MONOCYTES # BLD AUTO: 1.07 K/UL — HIGH (ref 0–0.9)
MONOCYTES NFR BLD AUTO: 10.2 % — SIGNIFICANT CHANGE UP (ref 2–14)
NEUTROPHILS # BLD AUTO: 4.64 K/UL — SIGNIFICANT CHANGE UP (ref 1.8–7.4)
NEUTROPHILS NFR BLD AUTO: 44.1 % — SIGNIFICANT CHANGE UP (ref 43–77)
NRBC # BLD: 0 /100 WBCS — SIGNIFICANT CHANGE UP (ref 0–0)
PLATELET # BLD AUTO: 76 K/UL — LOW (ref 150–400)
RBC # BLD: 3.43 M/UL — LOW (ref 4.2–5.8)
RBC # FLD: 17.7 % — HIGH (ref 10.3–14.5)
WBC # BLD: 10.5 K/UL — SIGNIFICANT CHANGE UP (ref 3.8–10.5)
WBC # FLD AUTO: 10.5 K/UL — SIGNIFICANT CHANGE UP (ref 3.8–10.5)

## 2020-09-22 ENCOUNTER — APPOINTMENT (OUTPATIENT)
Dept: INFUSION THERAPY | Facility: HOSPITAL | Age: 60
End: 2020-09-22

## 2020-09-22 DIAGNOSIS — R11.2 NAUSEA WITH VOMITING, UNSPECIFIED: ICD-10-CM

## 2020-09-22 DIAGNOSIS — Z51.11 ENCOUNTER FOR ANTINEOPLASTIC CHEMOTHERAPY: ICD-10-CM

## 2020-09-23 ENCOUNTER — APPOINTMENT (OUTPATIENT)
Dept: INFUSION THERAPY | Facility: HOSPITAL | Age: 60
End: 2020-09-23

## 2020-09-24 ENCOUNTER — RESULT REVIEW (OUTPATIENT)
Age: 60
End: 2020-09-24

## 2020-09-24 ENCOUNTER — APPOINTMENT (OUTPATIENT)
Dept: HEMATOLOGY ONCOLOGY | Facility: CLINIC | Age: 60
End: 2020-09-24
Payer: COMMERCIAL

## 2020-09-24 ENCOUNTER — APPOINTMENT (OUTPATIENT)
Dept: INFUSION THERAPY | Facility: HOSPITAL | Age: 60
End: 2020-09-24

## 2020-09-24 VITALS
WEIGHT: 197.31 LBS | BODY MASS INDEX: 26.78 KG/M2 | OXYGEN SATURATION: 94 % | SYSTOLIC BLOOD PRESSURE: 98 MMHG | RESPIRATION RATE: 17 BRPM | TEMPERATURE: 97.3 F | HEART RATE: 99 BPM | DIASTOLIC BLOOD PRESSURE: 64 MMHG

## 2020-09-24 LAB
ALBUMIN SERPL ELPH-MCNC: 4.2 G/DL
ALP BLD-CCNC: 113 U/L
ALT SERPL-CCNC: 10 U/L
ANION GAP SERPL CALC-SCNC: 14 MMOL/L
AST SERPL-CCNC: 15 U/L
BASOPHILS # BLD AUTO: 0.04 K/UL — SIGNIFICANT CHANGE UP (ref 0–0.2)
BASOPHILS NFR BLD AUTO: 0.4 % — SIGNIFICANT CHANGE UP (ref 0–2)
BILIRUB SERPL-MCNC: 0.4 MG/DL
BUN SERPL-MCNC: 17 MG/DL
CALCIUM SERPL-MCNC: 9 MG/DL
CHLORIDE SERPL-SCNC: 99 MMOL/L
CO2 SERPL-SCNC: 27 MMOL/L
CREAT SERPL-MCNC: 1.25 MG/DL
EOSINOPHIL # BLD AUTO: 0.31 K/UL — SIGNIFICANT CHANGE UP (ref 0–0.5)
EOSINOPHIL NFR BLD AUTO: 3.1 % — SIGNIFICANT CHANGE UP (ref 0–6)
GLUCOSE SERPL-MCNC: 108 MG/DL
HCT VFR BLD CALC: 32.1 % — LOW (ref 39–50)
HGB BLD-MCNC: 10.1 G/DL — LOW (ref 13–17)
IMM GRANULOCYTES NFR BLD AUTO: 0.4 % — SIGNIFICANT CHANGE UP (ref 0–1.5)
LDH SERPL-CCNC: 188 U/L
LYMPHOCYTES # BLD AUTO: 4.64 K/UL — HIGH (ref 1–3.3)
LYMPHOCYTES # BLD AUTO: 46.1 % — HIGH (ref 13–44)
MAGNESIUM SERPL-MCNC: 1.7 MG/DL
MCHC RBC-ENTMCNC: 31.5 G/DL — LOW (ref 32–36)
MCHC RBC-ENTMCNC: 32.5 PG — SIGNIFICANT CHANGE UP (ref 27–34)
MCV RBC AUTO: 103.2 FL — HIGH (ref 80–100)
MONOCYTES # BLD AUTO: 1.28 K/UL — HIGH (ref 0–0.9)
MONOCYTES NFR BLD AUTO: 12.7 % — SIGNIFICANT CHANGE UP (ref 2–14)
NEUTROPHILS # BLD AUTO: 3.76 K/UL — SIGNIFICANT CHANGE UP (ref 1.8–7.4)
NEUTROPHILS NFR BLD AUTO: 37.3 % — LOW (ref 43–77)
NRBC # BLD: 0 /100 WBCS — SIGNIFICANT CHANGE UP (ref 0–0)
PLATELET # BLD AUTO: 70 K/UL — LOW (ref 150–400)
POTASSIUM SERPL-SCNC: 3.8 MMOL/L
PROT SERPL-MCNC: 5.8 G/DL
RBC # BLD: 3.11 M/UL — LOW (ref 4.2–5.8)
RBC # FLD: 17.8 % — HIGH (ref 10.3–14.5)
SODIUM SERPL-SCNC: 141 MMOL/L
WBC # BLD: 10.07 K/UL — SIGNIFICANT CHANGE UP (ref 3.8–10.5)
WBC # FLD AUTO: 10.07 K/UL — SIGNIFICANT CHANGE UP (ref 3.8–10.5)

## 2020-09-24 PROCEDURE — 99215 OFFICE O/P EST HI 40 MIN: CPT

## 2020-09-24 NOTE — HISTORY OF PRESENT ILLNESS
[de-identified] : Mr. Delong is a 60 y/o male, with a previously diagnosed acute myeloid leukemia. A bone marrow biopsy from 7/3 revealed Acute myeloid leukemia (AML). FISH - report shows a population of aberrant myeloid blasts. He is currently being treated by Dr. Coelho for high risk AML with HIDAC consolidation chemotherapy. He was referred by Dr. Coelho for an initial consultation of a Haplo- transplant. \par \par The patient has a history of multiple blood clots - factor V Leiden runs in the family. He was diagnosed with sleep apnea, but refuses to use CPAP. He is a former smoker, 40 years. He also has a past medical history of PE, HTN, cardiomyopathy. He recently underwent an amputation of the right first toe due to an infection.\par \par S/p hospitalization at [facility] 10/3/19 - 11/1/19 for haplo (son) SCT. \par Upon admission, a TLC was placed in IR. Mr. Delong received IV fluid hydration, pain management, nutritional support, anxiolysis, antiemetics, and antiviral,  antifungal, antibacterial, GI, PCP and VOD prophylaxis. When his ANC dropped  below 1000, he was started on prophylactic Cefepime. Labs were monitored on a daily basis and he received transfusional support and electrolyte repletion as needed. \par \par While in patient, Mr. Delong was continued on his Voriconazole for history of aspergillosis. \par \par S/p hospitalization at [facility] 10/3/19 - 11/1/19 for haplo (son) SCT. \par Upon admission, a TLC was placed in IR. Mr. Delong received IV fluid hydration, pain management, nutritional support, anxiolysis, antiemetics, and antiviral, antifungal, antibacterial, GI, PCP and VOD prophylaxis. When his ANC dropped below 1000, he was started on prophylactic Cefepime. Labs were monitored on a daily basis and he received transfusional support and electrolyte repletion as needed. \par \par While in patient, Mr. Delong was continued on his Voriconazole for history of aspergillosis. \par \par During admission, Mr. Delong had pancytopenia related to the high dose chemotherapy prep regimen. He also experienced neutropenic fevers. When he became febrile, blood and urine cultures were sent and a CXR was completed which were unremarkable. \par \par On 10/9/2019, following premedications, pt received 250 ml of allogeneic, related, haplo, fresh, mobilized HPC apheresis over 30-60 minutes. \par Cell counts as follows: \par \par Total MNCs (x10^8/kg) = 5.17 \par CD 34 cells (x10^6/kg) = 7.34 \par CD 3 Cells (x 10^7/kg) = 19.81 \par Cell viability (%) = 100 \par \par Pt tolerated infusion without any adverse reaction or complications. \par \par Engraftment was noted on 10/28/2019. Daily Zarxio was discontinued, as was Cefepime given negative cultures. Mr. Delong was discharged home to f/u at the [facility].  [de-identified] : On 9/24/20 visit, day +42  of DLI #1 today. Overall feeling well. Occasionally feels off balance but no falls, stable. Adequate PO intake and hydration. Notes SOB with lying supine over the past couple days, currently taking Lasix 40mg daily. Ongoing intermittently pruritic GVHD rash of BUEs improved,  Stable BLE edema. Compliant with post transplant meds and restrictions. Denies fever, chills, headache, dizziness, blurred vision, mucositis/odynophagia, chest pain, cough, nausea/vomiting, diarrhea/constipation, abdominal pain, dysuria, bleeding, pain\par

## 2020-09-24 NOTE — ASSESSMENT
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 with the following comorbidities being managed:\par \par 1) AML\par S/p haplo (son) PSCT on 10/9/19\par 6/5/20 chimerism = 74.3% donor\par 6/23/20 chimerism = 67.7% donor (CD33 = 30% and CD3 = 100% donor)\par 6/25/20 BMbx: VINCE, normal male karyotype \par 7/8/20 chimerism = 57.7% donor (CD3 = 99% and CD33 = 10% donor)\par 7/21/20 chimerism = 59.3% donor (CD3 = 98.3% and CD33 = 7.3% donor)\par 8/13/20 chimerism = 56.7 % donor\par 9/2020 53%\par Current disease status: VINCE based on 6/25/20 BMbx and ongoing peripheral blood work..hope to see improvement in chimerism soon\par \par In setting of dropping chimerism, SQ Vidaza 32mg/m2 daily x 5 days every 28 days started 7/27/20 \par    Cycle 2 - 8/24-8/28 \par    Cycle 3 - 9/21-9/25\par S/p DLI #1 on 8/13/20\par \par 2) Heme\par ABO compatible transplant: pt and donor are both A pos\par Ongoing anemia and thrombocytopenia, poss poor graft function with dropping CD33 chimerism \par    9/8/20:   WBC 10.34   ANC 3.58   Hgb 10.2   PLT 71...9/24 plt 70\par Continue multivitamin and folic acid daily\par \par Hx of recurrent DVT/PE\par Factor V Leiden reportedly runs in the family \par 11/21/19 Doppler US of RUE negative for DVT\par Doppler US of BLEs on 7/2/20 negative for DVT\par Xarelto on hold while pancytopenic, to resume when PLT consistently >100K\par \par 3) ID\par Continue ppx:\par - Acyclovir 400mg bid \par - Mepron 750mg bid\par - Voriconazole 200 mg bid - hx of aspergillosis \par Post transplant crowd and dietary restrictions reviewed - in light of covid-19, CDC recs reviewed and strict restrictions reinforced \par \par CMV viremia\par CMV PCR peaked at 8,581 on 11/20/19\par Valcyte 450mg BID started 11/21/19. Decreased to QD as of 12/17/19. D/c'd 2/14/20 for ongoing thrombocytopenia \par 8/27/20 CMV PCR not detected \par Continue to monitor PCR at ever visit \par \par 4) GVHD\par Skin 0  Liver 0   GI 0 - overall grade 0\par No signs of chronic GVHD at this time\par Off MMF as of 11/22/19 \par Tacrolimus discontinued on 6/15/20\par Prednisone discontinued on 6/15/20\par Reviewed signs/symptoms of GVHD (i.e. rash, mucositis, nausea/vomiting, diarrhea)\par \par aGVHD of skin \par Maculopapular pruritic rash of face, neck, and upper chest (BSA 19%) noted 1/21/20 in setting of tapering prednisone (for FTT) - max skin stage 1, overall grade 1\par 1/21/20: rash on face, neck, and chest (BSA 19%); skin 1, overall grade 1; prednisone increased to 10mg daily\par 2/14/20: rash on neck, chest, and bilateral upper arms (BSA 18%); skin 1, overall grade 1: prednisone increased to 20mg daily\par 2/21/20: rash on neck and chest (10% BSA); skin 1, overall grade 1. Continue prednisone 20mg daily\par 3/12/20: rash on neck and chest (10% BSA); skin 1, overall grade 1. Continue prednisone 15mg daily  \par 3/19/20: rash on neck and chest (10% BSA); skin 1, overall grade 1. Continue prednisone 15 mg daily\par 4/2/20: rash on neck and chest (10% BSA); skin 1, overall grade 1. Continue prednisone 15 mg daily\par 4/9/20: rash resolved, prednisone decreased to 10mg daily\par 4/17/20: rash on neck and chest (10% BSA); skin 1, overall grade 1. Continue prednisone 10mg daily and reinforced compliance with triamcinolone cream bid \par 5/8/20: waxing and waning pruritic erythema of upper back, unsure if GVHD. Continue prednisone 10mg daily at this time\par 5/14/20: mild erythema of neck and upper back ( 5% BSA); skin 1, overall grade 1. Continue prednisone 10 mg daily\par 5/22/20: faint pruritic erythema of upper chest and upper back (9% BSA); skin 1, overall grade 1. Prednisone decreased 5mg daily for drop in chimerism \par 6/5/20: monitor very faint erythema of chest and upper back. Continue triamcinolone 0.1% cream bid PRN rash \par Continue to monitor closely \par 6/23/20: Mild erythema of upper back and bilateral upper extremities.Continue triamcinolone 0.1% cream bid PRN rash \par 7/2/20: pruritic erythema of upper back, chest, and bilateral upper arms (36% BSA). Defer starting oral immunosuppression at this time given drop in chimerism. Start Claritin daily and Pepcid bid. Will attempt to attain insurance auth for Lidex 0.1% cream bid, continue to monitor closely\par 7/8/20: Pruritic erythema of chest and bilateral upper arms (18% BSA). Patient will pay out of pocket today for lidex cream. To be applied BID. Continue claritin and pepcid. \par 7/21/20: no rash \par 7/30/20: erythematous rash of chest (9% BSA), continue Lidex cream bid \par 8/27/20: Erythematous rash of chest and back ( 9% BSA). Continue lidex cream BID\par 9/8/20: erythematous rash of BUEs (18% BSA), continue Lidex cream bid \par \par 5) GI\par Continue ppx:\par - Protonix 40 mg daily\par - Ursodiol 300 mg bid\par PRN Zofran and Reglan for nausea/vomiting\par \par 6) Cardiac\par HTN \par Continue metoprolol succinate 25mg daily\par \par Cardiomyopathy\par ECHO completed on  6/24/20, ordered by cardiology\par Continue Coreg 3.125mg bid \par Increased  Lasix to 40mg bid given SOB with lying supine, instructed to contact cardiologist if symptoms persist ///avoid excess etoh\par \par 7) Other\par BPH - continue finasteride 5mg daily and tamsulosin 0.4mg daily \par Anxiety/Depression - continue Zoloft 50mg daily\par Insomnia - continue PRN Xanax 0.5-1mg qhs\par \par 8) Plan/Dispo\par Pt educated regarding plan of care, all questions/concerns addressed\par F/u with \par annabel every 4 weeks...receiving treatment this week\par plan for DLI#2 10/8 with f/u visit

## 2020-09-24 NOTE — PHYSICAL EXAM
[Ambulatory and capable of all self care but unable to carry out any work activities] : Status 2- Ambulatory and capable of all self care but unable to carry out any work activities. Up and about more than 50% of waking hours [Normal] : affect appropriate [de-identified] : +1 BLE edema L>R [de-identified] : +BS; abdomen soft, non-distended, non-tender [de-identified] : erythematous rash of BUEs L>R

## 2020-09-25 ENCOUNTER — APPOINTMENT (OUTPATIENT)
Dept: INFUSION THERAPY | Facility: HOSPITAL | Age: 60
End: 2020-09-25

## 2020-09-25 ENCOUNTER — RX RENEWAL (OUTPATIENT)
Age: 60
End: 2020-09-25

## 2020-09-28 LAB — CMV DNA SPEC QL NAA+PROBE: NOT DETECTED IU/ML

## 2020-10-01 LAB — ENGRAFTMNET-POST: NORMAL

## 2020-10-06 ENCOUNTER — RX RENEWAL (OUTPATIENT)
Age: 60
End: 2020-10-06

## 2020-10-08 ENCOUNTER — LABORATORY RESULT (OUTPATIENT)
Age: 60
End: 2020-10-08

## 2020-10-08 ENCOUNTER — APPOINTMENT (OUTPATIENT)
Dept: HEMATOLOGY ONCOLOGY | Facility: CLINIC | Age: 60
End: 2020-10-08
Payer: COMMERCIAL

## 2020-10-08 ENCOUNTER — RESULT REVIEW (OUTPATIENT)
Age: 60
End: 2020-10-08

## 2020-10-08 ENCOUNTER — APPOINTMENT (OUTPATIENT)
Dept: INFUSION THERAPY | Facility: HOSPITAL | Age: 60
End: 2020-10-08

## 2020-10-08 LAB
BASOPHILS # BLD AUTO: 0 K/UL — SIGNIFICANT CHANGE UP (ref 0–0.2)
BASOPHILS NFR BLD AUTO: 0 % — SIGNIFICANT CHANGE UP (ref 0–2)
EOSINOPHIL # BLD AUTO: 0.34 K/UL — SIGNIFICANT CHANGE UP (ref 0–0.5)
EOSINOPHIL NFR BLD AUTO: 3 % — SIGNIFICANT CHANGE UP (ref 0–6)
HCT VFR BLD CALC: 33.3 % — LOW (ref 39–50)
HGB BLD-MCNC: 10.6 G/DL — LOW (ref 13–17)
LYMPHOCYTES # BLD AUTO: 54 % — HIGH (ref 13–44)
LYMPHOCYTES # BLD AUTO: 6.04 K/UL — HIGH (ref 1–3.3)
MCHC RBC-ENTMCNC: 31.8 G/DL — LOW (ref 32–36)
MCHC RBC-ENTMCNC: 33 PG — SIGNIFICANT CHANGE UP (ref 27–34)
MCV RBC AUTO: 103.7 FL — HIGH (ref 80–100)
MONOCYTES # BLD AUTO: 2.01 K/UL — HIGH (ref 0–0.9)
MONOCYTES NFR BLD AUTO: 18 % — HIGH (ref 2–14)
NEUTROPHILS # BLD AUTO: 2.8 K/UL — SIGNIFICANT CHANGE UP (ref 1.8–7.4)
NEUTROPHILS NFR BLD AUTO: 25 % — LOW (ref 43–77)
NRBC # BLD: 0 /100 — SIGNIFICANT CHANGE UP (ref 0–0)
NRBC # BLD: SIGNIFICANT CHANGE UP /100 WBCS (ref 0–0)
PLAT MORPH BLD: NORMAL — SIGNIFICANT CHANGE UP
PLATELET # BLD AUTO: 56 K/UL — LOW (ref 150–400)
RBC # BLD: 3.21 M/UL — LOW (ref 4.2–5.8)
RBC # FLD: 18.9 % — HIGH (ref 10.3–14.5)
RBC BLD AUTO: SIGNIFICANT CHANGE UP
WBC # BLD: 11.19 K/UL — HIGH (ref 3.8–10.5)
WBC # FLD AUTO: 11.19 K/UL — HIGH (ref 3.8–10.5)

## 2020-10-08 PROCEDURE — 99215 OFFICE O/P EST HI 40 MIN: CPT

## 2020-10-09 NOTE — REASON FOR VISIT
[Follow-Up Visit] : a follow-up visit for [Acute Myeloid Leukemia] : acute myeloid leukemia [FreeTextEntry2] : AML s/p haplo (son) PBSCT on 10/9/19. S/p DLI #1 on 8/13/20, DLI # 2 on 10/8/20

## 2020-10-09 NOTE — REVIEW OF SYSTEMS
[Negative] : Allergic/Immunologic [Chest Pain] : no chest pain [Palpitations] : no palpitations [Leg Claudication] : no intermittent leg claudication [Lower Ext Edema] : lower extremity edema [FreeTextEntry5] : Mild B/L LE edema

## 2020-10-09 NOTE — ASSESSMENT
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 with the following comorbidities being managed:\par \par 1) AML\par S/p haplo (son) PSCT on 10/9/19\par 6/5/20 chimerism = 74.3% donor\par 6/23/20 chimerism = 67.7% donor (CD33 = 30% and CD3 = 100% donor)\par 6/25/20 BMbx: VINCE, normal male karyotype \par 7/8/20 chimerism = 57.7% donor (CD3 = 99% and CD33 = 10% donor)\par 7/21/20 chimerism = 59.3% donor (CD3 = 98.3% and CD33 = 7.3% donor)\par 8/13/20 chimerism = 56.7 % donor\par 9/2020 53%\par 9/24/20 chimerism= 55% donor\par Current disease status: VINCE based on 6/25/20 BMbx and ongoing peripheral blood work.\par \par In setting of dropping chimerism, SQ Vidaza 32mg/m2 daily x 5 days every 28 days started 7/27/20 \par    Cycle 2 - 8/24-8/28 \par    Cycle 3 - 9/21-9/25\par     Cycle 4 - 10/19- 10/23\par S/p DLI #1 on 8/13/20\par S/P DLI # 2 on 10/8/20\par \par DLI # 2\par DLI infusion note: 75 ml of thawed, diluted mnc apheresis product infused over 26 minutes with premedications without complication. Total CD 3: 1.26 * 10^7/kg, 85% cell viability\par \par \par 2) Heme\par ABO compatible transplant: pt and donor are both A pos\par Ongoing anemia and thrombocytopenia, poss poor graft function with dropping CD33 chimerism \par    10/8/20:  WBC 11.19   ANC  2.80 Hgb 10.6   PLT 56\par Continue multivitamin and folic acid daily\par \par Hx of recurrent DVT/PE\par Factor V Leiden reportedly runs in the family \par 11/21/19 Doppler US of RUE negative for DVT\par Doppler US of BLEs on 7/2/20 negative for DVT\par Xarelto on hold while pancytopenic, to resume when PLT consistently >100K\par \par 3) ID\par Continue ppx:\par - Acyclovir 400mg bid \par - Mepron 750mg bid\par - Voriconazole 200 mg bid - hx of aspergillosis \par Post transplant crowd and dietary restrictions reviewed - in light of covid-19, [facility] recs reviewed and strict restrictions reinforced \par \par CMV viremia\par CMV PCR peaked at 8,581 on 11/20/19\par Valcyte 450mg BID started 11/21/19. Decreased to QD as of 12/17/19. D/c'd 2/14/20 for ongoing thrombocytopenia \par 8/27/20 CMV PCR not detected \par Continue to monitor PCR at ever visit \par \par 4) GVHD\par Skin 0  Liver 0   GI 0 - overall grade 0\par No signs of chronic GVHD at this time\par Off MMF as of 11/22/19 \par Tacrolimus discontinued on 6/15/20\par Prednisone discontinued on 6/15/20\par Reviewed signs/symptoms of GVHD (i.e. rash, mucositis, nausea/vomiting, diarrhea)\par \par aGVHD of skin \par Maculopapular pruritic rash of face, neck, and upper chest (BSA 19%) noted 1/21/20 in setting of tapering prednisone (for FTT) - max skin stage 1, overall grade 1\par 1/21/20: rash on face, neck, and chest (BSA 19%); skin 1, overall grade 1; prednisone increased to 10mg daily\par 2/14/20: rash on neck, chest, and bilateral upper arms (BSA 18%); skin 1, overall grade 1: prednisone increased to 20mg daily\par 2/21/20: rash on neck and chest (10% BSA); skin 1, overall grade 1. Continue prednisone 20mg daily\par 3/12/20: rash on neck and chest (10% BSA); skin 1, overall grade 1. Continue prednisone 15mg daily  \par 3/19/20: rash on neck and chest (10% BSA); skin 1, overall grade 1. Continue prednisone 15 mg daily\par 4/2/20: rash on neck and chest (10% BSA); skin 1, overall grade 1. Continue prednisone 15 mg daily\par 4/9/20: rash resolved, prednisone decreased to 10mg daily\par 4/17/20: rash on neck and chest (10% BSA); skin 1, overall grade 1. Continue prednisone 10mg daily and reinforced compliance with triamcinolone cream bid \par 5/8/20: waxing and waning pruritic erythema of upper back, unsure if GVHD. Continue prednisone 10mg daily at this time\par 5/14/20: mild erythema of neck and upper back ( 5% BSA); skin 1, overall grade 1. Continue prednisone 10 mg daily\par 5/22/20: faint pruritic erythema of upper chest and upper back (9% BSA); skin 1, overall grade 1. Prednisone decreased 5mg daily for drop in chimerism \par 6/5/20: monitor very faint erythema of chest and upper back. Continue triamcinolone 0.1% cream bid PRN rash \par Continue to monitor closely \par 6/23/20: Mild erythema of upper back and bilateral upper extremities.Continue triamcinolone 0.1% cream bid PRN rash \par 7/2/20: pruritic erythema of upper back, chest, and bilateral upper arms (36% BSA). Defer starting oral immunosuppression at this time given drop in chimerism. Start Claritin daily and Pepcid bid. Will attempt to attain insurance auth for Lidex 0.1% cream bid, continue to monitor closely\par 7/8/20: Pruritic erythema of chest and bilateral upper arms (18% BSA). Patient will pay out of pocket today for lidex cream. To be applied BID. Continue claritin and pepcid. \par 7/21/20: no rash \par 7/30/20: erythematous rash of chest (9% BSA), continue Lidex cream bid \par 8/27/20: Erythematous rash of chest and back ( 9% BSA). Continue lidex cream BID\par 9/8/20: erythematous rash of BUEs (18% BSA), continue Lidex cream bid \par \par 5) GI\par Continue ppx:\par - Protonix 40 mg daily\par - Ursodiol 300 mg bid\par PRN Zofran and Reglan for nausea/vomiting\par \par 6) Cardiac\par HTN \par Continue metoprolol succinate 25mg daily\par \par Cardiomyopathy\par ECHO completed on  6/24/20, ordered by cardiology\par Continue Coreg 3.125mg bid \par Increased  Lasix to 40mg bid given SOB with lying supine, instructed to contact cardiologist if symptoms persist. Avoid excess etoh\par \par 7) Other\par BPH - continue finasteride 5mg daily and tamsulosin 0.4mg daily \par Anxiety/Depression - continue Zoloft 50mg daily\par Insomnia - continue PRN Xanax 0.5-1mg qhs\par \par 8) Plan/Dispo\par Pt educated regarding plan of care, all questions/concerns addressed\par DLI # 2 today\par Next cycle of vidaza scheduled for the week of 10/19/20. Continue every 4 weeks.\par F/u with Dr Gaytan in two weeks
yes

## 2020-10-09 NOTE — HISTORY OF PRESENT ILLNESS
[de-identified] : Mr. Delong is a 58 y/o male, with a previously diagnosed acute myeloid leukemia. A bone marrow biopsy from 7/3 revealed Acute myeloid leukemia (AML). FISH - report shows a population of aberrant myeloid blasts. He is currently being treated by Dr. Coelho for high risk AML with HIDAC consolidation chemotherapy. He was referred by Dr. Coelho for an initial consultation of a Haplo- transplant. \par \par The patient has a history of multiple blood clots - factor V Leiden runs in the family. He was diagnosed with sleep apnea, but refuses to use CPAP. He is a former smoker, 40 years. He also has a past medical history of PE, HTN, cardiomyopathy. He recently underwent an amputation of the right first toe due to an infection.\par \par S/p hospitalization at [facility] 10/3/19 - 11/1/19 for haplo (son) SCT. \par Upon admission, a TLC was placed in IR. Mr. Delong received IV fluid hydration, pain management, nutritional support, anxiolysis, antiemetics, and antiviral,  antifungal, antibacterial, GI, PCP and VOD prophylaxis. When his ANC dropped  below 1000, he was started on prophylactic Cefepime. Labs were monitored on a daily basis and he received transfusional support and electrolyte repletion as needed. \par \par While in patient, Mr. Delong was continued on his Voriconazole for history of aspergillosis. \par \par S/p hospitalization at [facility] 10/3/19 - 11/1/19 for haplo (son) SCT. \par Upon admission, a TLC was placed in IR. Mr. Delong received IV fluid hydration, pain management, nutritional support, anxiolysis, antiemetics, and antiviral, antifungal, antibacterial, GI, PCP and VOD prophylaxis. When his ANC dropped below 1000, he was started on prophylactic Cefepime. Labs were monitored on a daily basis and he received transfusional support and electrolyte repletion as needed. \par \par While in patient, Mr. Delong was continued on his Voriconazole for history of aspergillosis. \par \par During admission, Mr. Delong had pancytopenia related to the high dose chemotherapy prep regimen. He also experienced neutropenic fevers. When he became febrile, blood and urine cultures were sent and a CXR was completed which were unremarkable. \par \par On 10/9/2019, following premedications, pt received 250 ml of allogeneic, related, haplo, fresh, mobilized HPC apheresis over 30-60 minutes. \par Cell counts as follows: \par \par Total MNCs (x10^8/kg) = 5.17 \par CD 34 cells (x10^6/kg) = 7.34 \par CD 3 Cells (x 10^7/kg) = 19.81 \par Cell viability (%) = 100 \par \par Pt tolerated infusion without any adverse reaction or complications. \par \par Engraftment was noted on 10/28/2019. Daily Zarxio was discontinued, as was Cefepime given negative cultures. Mr. Delong was discharged home to f/u at the [facility].  [de-identified] : On 9/24/20 visit, day +42  of DLI #1 today. Overall feeling well. Occasionally feels off balance but no falls, stable. Adequate PO intake and hydration. Notes SOB with lying supine over the past couple days, currently taking Lasix 40mg daily. Ongoing intermittently pruritic GVHD rash of BUEs improved,  Stable BLE edema. Compliant with post transplant meds and restrictions. Denies fever, chills, headache, dizziness, blurred vision, mucositis/odynophagia, chest pain, cough, nausea/vomiting, diarrhea/constipation, abdominal pain, dysuria, bleeding, pain\par \par On 10/8/20 visit, examined patient in treatment room today. Vital signs reviewed in treatment room. Received DLI # 2 today without any complications. Overall patient is well and offers no acute concerns. Denies fever, chills, nausea, vomiting, diarrhea, rash, mouth sores, dysuria or any signs of active bleeding. Denies SOB or chest pain. Mild edema of bilateral lower extremities. Remains compliant with post transplant medications.

## 2020-10-09 NOTE — PHYSICAL EXAM
[Ambulatory and capable of all self care but unable to carry out any work activities] : Status 2- Ambulatory and capable of all self care but unable to carry out any work activities. Up and about more than 50% of waking hours [Normal] : normal appearance, no rash, nodules, vesicles, ulcers, erythema [de-identified] : mild B/L LE edema [de-identified] : +BS; abdomen soft, non-distended, non-tender

## 2020-10-22 NOTE — PHYSICAL EXAM
[Ambulatory and capable of all self care but unable to carry out any work activities] : Status 2- Ambulatory and capable of all self care but unable to carry out any work activities. Up and about more than 50% of waking hours [Normal] : affect appropriate [de-identified] : mild B/L LE edema [de-identified] : +BS; abdomen soft, non-distended, non-tender

## 2020-10-22 NOTE — HISTORY OF PRESENT ILLNESS
[de-identified] : Mr. Delong is a 60 y/o male, with a previously diagnosed acute myeloid leukemia. A bone marrow biopsy from 7/3 revealed Acute myeloid leukemia (AML). FISH - report shows a population of aberrant myeloid blasts. He is currently being treated by Dr. Coelho for high risk AML with HIDAC consolidation chemotherapy. He was referred by Dr. Coelho for an initial consultation of a Haplo- transplant. \par \par The patient has a history of multiple blood clots - factor V Leiden runs in the family. He was diagnosed with sleep apnea, but refuses to use CPAP. He is a former smoker, 40 years. He also has a past medical history of PE, HTN, cardiomyopathy. He recently underwent an amputation of the right first toe due to an infection.\par \par S/p hospitalization at [facility] 10/3/19 - 11/1/19 for haplo (son) SCT. \par Upon admission, a TLC was placed in IR. Mr. Delong received IV fluid hydration, pain management, nutritional support, anxiolysis, antiemetics, and antiviral,  antifungal, antibacterial, GI, PCP and VOD prophylaxis. When his ANC dropped  below 1000, he was started on prophylactic Cefepime. Labs were monitored on a daily basis and he received transfusional support and electrolyte repletion as needed. \par \par While in patient, Mr. Delong was continued on his Voriconazole for history of aspergillosis. \par \par S/p hospitalization at [facility] 10/3/19 - 11/1/19 for haplo (son) SCT. \par Upon admission, a TLC was placed in IR. Mr. Delong received IV fluid hydration, pain management, nutritional support, anxiolysis, antiemetics, and antiviral, antifungal, antibacterial, GI, PCP and VOD prophylaxis. When his ANC dropped below 1000, he was started on prophylactic Cefepime. Labs were monitored on a daily basis and he received transfusional support and electrolyte repletion as needed. \par \par While in patient, Mr. Delong was continued on his Voriconazole for history of aspergillosis. \par \par During admission, Mr. Delong had pancytopenia related to the high dose chemotherapy prep regimen. He also experienced neutropenic fevers. When he became febrile, blood and urine cultures were sent and a CXR was completed which were unremarkable. \par \par On 10/9/2019, following premedications, pt received 250 ml of allogeneic, related, haplo, fresh, mobilized HPC apheresis over 30-60 minutes. \par Cell counts as follows: \par \par Total MNCs (x10^8/kg) = 5.17 \par CD 34 cells (x10^6/kg) = 7.34 \par CD 3 Cells (x 10^7/kg) = 19.81 \par Cell viability (%) = 100 \par \par Pt tolerated infusion without any adverse reaction or complications. \par \par Engraftment was noted on 10/28/2019. Daily Zarxio was discontinued, as was Cefepime given negative cultures. Mr. Delong was discharged home to f/u at the [facility].  [de-identified] : On 9/24/20 visit, day +42  of DLI #1 today. Overall feeling well. Occasionally feels off balance but no falls, stable. Adequate PO intake and hydration. Notes SOB with lying supine over the past couple days, currently taking Lasix 40mg daily. Ongoing intermittently pruritic GVHD rash of BUEs improved,  Stable BLE edema. Compliant with post transplant meds and restrictions. Denies fever, chills, headache, dizziness, blurred vision, mucositis/odynophagia, chest pain, cough, nausea/vomiting, diarrhea/constipation, abdominal pain, dysuria, bleeding, pain\par \par On 10/8/20 visit, examined patient in treatment room today. Vital signs reviewed in treatment room. Received DLI # 2 today without any complications. Overall patient is well and offers no acute concerns. Denies fever, chills, nausea, vomiting, diarrhea, rash, mouth sores, dysuria or any signs of active bleeding. Denies SOB or chest pain. Mild edema of bilateral lower extremities. Remains compliant with post transplant medications. \par \par 10/20 Overall feeling well...tolerating vidazza..no new issues

## 2020-10-22 NOTE — REASON FOR VISIT
[Follow-Up Visit] : a follow-up visit for [Acute Myeloid Leukemia] : acute myeloid leukemia [Spouse] : spouse

## 2020-10-22 NOTE — ASSESSMENT
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 with the following comorbidities being managed:\par \par 1) AML\par S/p haplo (son) PSCT on 10/9/19\par 6/5/20 chimerism = 74.3% donor\par 6/23/20 chimerism = 67.7% donor (CD33 = 30% and CD3 = 100% donor)\par 6/25/20 BMbx: VINCE, normal male karyotype \par 7/8/20 chimerism = 57.7% donor (CD3 = 99% and CD33 = 10% donor)\par 7/21/20 chimerism = 59.3% donor (CD3 = 98.3% and CD33 = 7.3% donor)\par 8/13/20 chimerism = 56.7 % donor\par 9/2020 53%\par 9/24/20 chimerism= 55% donor\par 10/8/20  53%\par Current disease status: VINCE based on 6/25/20 BMbx and ongoing peripheral blood work.\par \par In setting of dropping chimerism, SQ Vidaza 32mg/m2 daily x 5 days every 28 days started 7/27/20 \par    Cycle 2 - 8/24-8/28 \par    Cycle 3 - 9/21-9/25\par     Cycle 4 - 10/19- 10/23\par S/p DLI #1 on 8/13/20\par S/P DLI # 2 on 10/8/20\par for #3 12/3\par \par DLI # 2\par DLI infusion note: 75 ml of thawed, diluted mnc apheresis product infused over 26 minutes with premedications without complication. Total CD 3: 1.26 * 10^7/kg, 85% cell viability\par \par \par 2) Heme\par ABO compatible transplant: pt and donor are both A pos\par Ongoing anemia and thrombocytopenia, poss poor graft function with dropping CD33 chimerism \par plt 60\par Continue multivitamin and folic acid daily\par \par Hx of recurrent DVT/PE\par Factor V Leiden reportedly runs in the family \par 11/21/19 Doppler US of RUE negative for DVT\par Doppler US of BLEs on 7/2/20 negative for DVT\par Xarelto on hold while pancytopenic, to resume when PLT consistently >100K\par \par 3) ID\par Continue ppx:\par - Acyclovir 400mg bid \par - Mepron 750mg bid\par - Voriconazole 200 mg bid - hx of aspergillosis \par Post transplant crowd and dietary restrictions reviewed - in light of covid-19, [facility] recs reviewed and strict restrictions reinforced \par \par CMV viremia\par CMV PCR peaked at 8,581 on 11/20/19\par Valcyte 450mg BID started 11/21/19. Decreased to QD as of 12/17/19. D/c'd 2/14/20 for ongoing thrombocytopenia \par 8/27/20 CMV PCR not detected \par Continue to monitor PCR at ever visit \par \par 4) GVHD\par Skin 0  Liver 0   GI 0 - overall grade 0\par No signs of chronic GVHD at this time\par Off MMF as of 11/22/19 \par Tacrolimus discontinued on 6/15/20\par Prednisone discontinued on 6/15/20\par Reviewed signs/symptoms of GVHD (i.e. rash, mucositis, nausea/vomiting, diarrhea)\par \par aGVHD of skin \par Maculopapular pruritic rash of face, neck, and upper chest (BSA 19%) noted 1/21/20 in setting of tapering prednisone (for FTT) - max skin stage 1, overall grade 1\par 1/21/20: rash on face, neck, and chest (BSA 19%); skin 1, overall grade 1; prednisone increased to 10mg daily\par 2/14/20: rash on neck, chest, and bilateral upper arms (BSA 18%); skin 1, overall grade 1: prednisone increased to 20mg daily\par 2/21/20: rash on neck and chest (10% BSA); skin 1, overall grade 1. Continue prednisone 20mg daily\par 3/12/20: rash on neck and chest (10% BSA); skin 1, overall grade 1. Continue prednisone 15mg daily  \par 3/19/20: rash on neck and chest (10% BSA); skin 1, overall grade 1. Continue prednisone 15 mg daily\par 4/2/20: rash on neck and chest (10% BSA); skin 1, overall grade 1. Continue prednisone 15 mg daily\par 4/9/20: rash resolved, prednisone decreased to 10mg daily\par 4/17/20: rash on neck and chest (10% BSA); skin 1, overall grade 1. Continue prednisone 10mg daily and reinforced compliance with triamcinolone cream bid \par 5/8/20: waxing and waning pruritic erythema of upper back, unsure if GVHD. Continue prednisone 10mg daily at this time\par 5/14/20: mild erythema of neck and upper back ( 5% BSA); skin 1, overall grade 1. Continue prednisone 10 mg daily\par 5/22/20: faint pruritic erythema of upper chest and upper back (9% BSA); skin 1, overall grade 1. Prednisone decreased 5mg daily for drop in chimerism \par 6/5/20: monitor very faint erythema of chest and upper back. Continue triamcinolone 0.1% cream bid PRN rash \par Continue to monitor closely \par 6/23/20: Mild erythema of upper back and bilateral upper extremities.Continue triamcinolone 0.1% cream bid PRN rash \par 7/2/20: pruritic erythema of upper back, chest, and bilateral upper arms (36% BSA). Defer starting oral immunosuppression at this time given drop in chimerism. Start Claritin daily and Pepcid bid. Will attempt to attain insurance auth for Lidex 0.1% cream bid, continue to monitor closely\par 7/8/20: Pruritic erythema of chest and bilateral upper arms (18% BSA). Patient will pay out of pocket today for lidex cream. To be applied BID. Continue claritin and pepcid. \par 7/21/20: no rash \par 7/30/20: erythematous rash of chest (9% BSA), continue Lidex cream bid \par 8/27/20: Erythematous rash of chest and back ( 9% BSA). Continue lidex cream BID\par 9/8/20: erythematous rash of BUEs (18% BSA), continue Lidex cream bid \par \par 5) GI\par Continue ppx:\par - Protonix 40 mg daily\par - Ursodiol 300 mg bid\par PRN Zofran and Reglan for nausea/vomiting\par \par 6) Cardiac\par HTN \par Continue metoprolol succinate 25mg daily\par \par Cardiomyopathy\par ECHO completed on  6/24/20, ordered by cardiology\par Continue Coreg 3.125mg bid \par Increased  Lasix to 40mg bid given SOB with lying supine, instructed to contact cardiologist if symptoms persist. Avoid excess etoh\par \par 7) Other\par BPH - continue finasteride 5mg daily and tamsulosin 0.4mg daily \par Anxiety/Depression - continue Zoloft 50mg daily\par Insomnia - continue PRN Xanax 0.5-1mg qhs\par \par 8) Plan/Dispo\par Pt educated regarding plan of care, all questions/concerns addressed\par Monitor for GVHD\par  vidaza scheduled for the week of 10/19/20. Continue every 4 weeks.\par F/u with  in two weeks

## 2020-10-22 NOTE — REVIEW OF SYSTEMS
[Lower Ext Edema] : lower extremity edema [Negative] : Allergic/Immunologic [Chest Pain] : no chest pain [Palpitations] : no palpitations [Leg Claudication] : no intermittent leg claudication [FreeTextEntry5] : Mild B/L LE edema

## 2020-11-02 NOTE — PROGRESS NOTE ADULT - I WAS PHYSICALLY PRESENT FOR THE KEY PORTIONS OF THE EVALUATION AND MANAGEMENT (E/M) SERVICE PROVIDED.  I AGREE WITH THE ABOVE HISTORY, PHYSICAL, AND PLAN WHICH I HAVE REVIEWED AND EDITED WHERE APPROPRIATE
Statement Selected
Name band;
Statement Selected

## 2020-11-05 NOTE — ASSESSMENT
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 with the following comorbidities being managed:\par \par 1) AML\par S/p haplo (son) PSCT on 10/9/19\par 6/5/20 chimerism = 74.3% donor\par 6/23/20 chimerism = 67.7% donor (CD33 = 30% and CD3 = 100% donor)\par 6/25/20 BMbx: VINCE, normal male karyotype \par 7/8/20 chimerism = 57.7% donor (CD3 = 99% and CD33 = 10% donor)\par 7/21/20 chimerism = 59.3% donor (CD3 = 98.3% and CD33 = 7.3% donor)\par 8/13/20 chimerism = 56.7 % donor\par 9/2020 53%\par 9/24/20 chimerism= 55% donor\par 10/8/20  53%\par 10/22/20 chimerism= 38.3 % donor ( CD 3= 99% and CD 33= 1% donor)\par Current disease status: VINCE based on 6/25/20 BMbx and ongoing peripheral blood work.\par \par In setting of dropping chimerism, SQ Vidaza 32mg/m2 daily x 5 days every 28 days started 7/27/20 \par    Cycle 2 - 8/24-8/28 \par    Cycle 3 - 9/21-9/25\par     Cycle 4 - 10/19- 10/23\par      Cycle 5- 11/16- 11/20\par S/p DLI #1 on 8/13/20\par S/P DLI # 2 on 10/8/20\par Scheduled for DLI  #3 on 12/3/20\par \par 2) Heme\par ABO compatible transplant: pt and donor are both A pos\par Ongoing anemia and thrombocytopenia, poss poor graft function with dropping CD33 chimerism \par 11/4/20: WBC 6.61  H/H 11.2/37.2  PLT 43  ANC 2.43\par Continue multivitamin and folic acid daily\par \par Hx of recurrent DVT/PE\par Factor V Leiden reportedly runs in the family \par 11/21/19 Doppler US of RUE negative for DVT\par Doppler US of BLEs on 7/2/20 negative for DVT\par Xarelto on hold while pancytopenic, to resume when PLT consistently >100K\par \par 3) ID\par Continue ppx:\par - Acyclovir 400 mg bid \par - Mepron 750 mg bid\par - Voriconazole 200 mg bid - hx of aspergillosis \par Post transplant crowd and dietary restrictions reviewed - in light of covid-19, [facility] recs reviewed and strict restrictions reinforced \par \par CMV viremia\par CMV PCR peaked at 8,581 on 11/20/19\par Valcyte 450mg BID started 11/21/19. Decreased to QD as of 12/17/19. D/c'd 2/14/20 for ongoing thrombocytopenia \par 8/27/20 CMV PCR not detected \par 10/22/20 CMV PCR not detected\par Continue to monitor PCR at ever visit \par \par 4) GVHD\par Skin 0  Liver 0   GI 0 - overall grade 0\par No signs of chronic GVHD at this time\par Off MMF as of 11/22/19 \par Tacrolimus discontinued on 6/15/20\par Prednisone discontinued on 6/15/20\par Reviewed signs/symptoms of GVHD (i.e. rash, mucositis, nausea/vomiting, diarrhea)\par \par aGVHD of skin \par Maculopapular pruritic rash of face, neck, and upper chest (BSA 19%) noted 1/21/20 in setting of tapering prednisone (for FTT) - max skin stage 1, overall grade 1\par 1/21/20: rash on face, neck, and chest (BSA 19%); skin 1, overall grade 1; prednisone increased to 10mg daily\par 2/14/20: rash on neck, chest, and bilateral upper arms (BSA 18%); skin 1, overall grade 1: prednisone increased to 20mg daily\par 2/21/20: rash on neck and chest (10% BSA); skin 1, overall grade 1. Continue prednisone 20mg daily\par 3/12/20: rash on neck and chest (10% BSA); skin 1, overall grade 1. Continue prednisone 15mg daily  \par 3/19/20: rash on neck and chest (10% BSA); skin 1, overall grade 1. Continue prednisone 15 mg daily\par 4/2/20: rash on neck and chest (10% BSA); skin 1, overall grade 1. Continue prednisone 15 mg daily\par 4/9/20: rash resolved, prednisone decreased to 10mg daily\par 4/17/20: rash on neck and chest (10% BSA); skin 1, overall grade 1. Continue prednisone 10mg daily and reinforced compliance with triamcinolone cream bid \par 5/8/20: waxing and waning pruritic erythema of upper back, unsure if GVHD. Continue prednisone 10mg daily at this time\par 5/14/20: mild erythema of neck and upper back ( 5% BSA); skin 1, overall grade 1. Continue prednisone 10 mg daily\par 5/22/20: faint pruritic erythema of upper chest and upper back (9% BSA); skin 1, overall grade 1. Prednisone decreased 5mg daily for drop in chimerism \par 6/5/20: monitor very faint erythema of chest and upper back. Continue triamcinolone 0.1% cream bid PRN rash \par Continue to monitor closely \par 6/23/20: Mild erythema of upper back and bilateral upper extremities.Continue triamcinolone 0.1% cream bid PRN rash \par 7/2/20: pruritic erythema of upper back, chest, and bilateral upper arms (36% BSA). Defer starting oral immunosuppression at this time given drop in chimerism. Start Claritin daily and Pepcid bid. Will attempt to attain insurance auth for Lidex 0.1% cream bid, continue to monitor closely\par 7/8/20: Pruritic erythema of chest and bilateral upper arms (18% BSA). Patient will pay out of pocket today for lidex cream. To be applied BID. Continue claritin and pepcid. \par 7/21/20: no rash \par 7/30/20: erythematous rash of chest (9% BSA), continue Lidex cream bid \par 8/27/20: Erythematous rash of chest and back ( 9% BSA). Continue lidex cream BID\par 9/8/20: erythematous rash of BUEs (18% BSA), continue Lidex cream bid \par RESOLVED\par \par \par 5) GI\par Continue ppx:\par - Protonix 40 mg daily\par - Ursodiol 300 mg bid\par PRN Zofran and Reglan for nausea/vomiting\par \par 6) Cardiac\par HTN \par Continue metoprolol succinate 25 mg daily\par \par Cardiomyopathy\par ECHO completed on  6/24/20, ordered by cardiology\par Continue Coreg 3.125mg bid \par Increased  Lasix to 40mg bid given SOB with lying supine, instructed to contact cardiologist if symptoms persist. \par On 11/4/20 increased lasix to 80 mg BID for two days. Then decrease lasix to 40 mg BID.\par ECHO scheduled for 11/5/20\par \par Shortness of breath\par STAT chest xray completed on 11/4/20- heart enlarged with small bilateral pleural effusions\par Increased lasix to 80 mg BID on 11/4/20 for two days. Then decrease lasix to 40 mg BID.\par Echo scheduled for 11/5/20. Patient and patients girlfriend aware patient needs to see Dr Lyn. \par Patient is aware he cannot have any  ETOH . Dr Gaytan met with patient and explained patient cannot drink ETOH. Contributing to fluid overload. \par \par \par 7) Other\par BPH - continue finasteride 5mg daily and tamsulosin 0.4mg daily \par Anxiety/Depression - continue Zoloft 50mg daily\par Insomnia - continue PRN Xanax 0.5-1mg qhs\par \par 8) Plan/Dispo\par Pt educated regarding plan of care, all questions/concerns addressed\par Monitor for GVHD\par Vidaza scheduled for the week of 11/16/20. \par DLI Scheduled for 12/3/20.\par F/u with NP Veronika on 11/16/20.

## 2020-11-05 NOTE — HISTORY OF PRESENT ILLNESS
[de-identified] : Mr. Delong is a 60 y/o male, with a previously diagnosed acute myeloid leukemia. A bone marrow biopsy from 7/3 revealed Acute myeloid leukemia (AML). FISH - report shows a population of aberrant myeloid blasts. He is currently being treated by Dr. Coelho for high risk AML with HIDAC consolidation chemotherapy. He was referred by Dr. Coelho for an initial consultation of a Haplo- transplant. \par \par The patient has a history of multiple blood clots - factor V Leiden runs in the family. He was diagnosed with sleep apnea, but refuses to use CPAP. He is a former smoker, 40 years. He also has a past medical history of PE, HTN, cardiomyopathy. He recently underwent an amputation of the right first toe due to an infection.\par \par S/p hospitalization at [facility] 10/3/19 - 11/1/19 for haplo (son) SCT. \par Upon admission, a TLC was placed in IR. Mr. Delong received IV fluid hydration, pain management, nutritional support, anxiolysis, antiemetics, and antiviral,  antifungal, antibacterial, GI, PCP and VOD prophylaxis. When his ANC dropped  below 1000, he was started on prophylactic Cefepime. Labs were monitored on a daily basis and he received transfusional support and electrolyte repletion as needed. \par \par While in patient, Mr. Delong was continued on his Voriconazole for history of aspergillosis. \par \par S/p hospitalization at [facility] 10/3/19 - 11/1/19 for haplo (son) SCT. \par Upon admission, a TLC was placed in IR. Mr. Delong received IV fluid hydration, pain management, nutritional support, anxiolysis, antiemetics, and antiviral, antifungal, antibacterial, GI, PCP and VOD prophylaxis. When his ANC dropped below 1000, he was started on prophylactic Cefepime. Labs were monitored on a daily basis and he received transfusional support and electrolyte repletion as needed. \par \par While in patient, Mr. Delong was continued on his Voriconazole for history of aspergillosis. \par \par During admission, Mr. Delong had pancytopenia related to the high dose chemotherapy prep regimen. He also experienced neutropenic fevers. When he became febrile, blood and urine cultures were sent and a CXR was completed which were unremarkable. \par \par On 10/9/2019, following premedications, pt received 250 ml of allogeneic, related, haplo, fresh, mobilized HPC apheresis over 30-60 minutes. \par Cell counts as follows: \par \par Total MNCs (x10^8/kg) = 5.17 \par CD 34 cells (x10^6/kg) = 7.34 \par CD 3 Cells (x 10^7/kg) = 19.81 \par Cell viability (%) = 100 \par \par Pt tolerated infusion without any adverse reaction or complications. \par \par Engraftment was noted on 10/28/2019. Daily Zarxio was discontinued, as was Cefepime given negative cultures. Mr. Delong was discharged home to f/u at the [facility].  [de-identified] : On 9/24/20 visit, day +42  of DLI #1 today. Overall feeling well. Occasionally feels off balance but no falls, stable. Adequate PO intake and hydration. Notes SOB with lying supine over the past couple days, currently taking Lasix 40mg daily. Ongoing intermittently pruritic GVHD rash of BUEs improved,  Stable BLE edema. Compliant with post transplant meds and restrictions. Denies fever, chills, headache, dizziness, blurred vision, mucositis/odynophagia, chest pain, cough, nausea/vomiting, diarrhea/constipation, abdominal pain, dysuria, bleeding, pain\par \par On 10/8/20 visit, examined patient in treatment room today. Vital signs reviewed in treatment room. Received DLI # 2 today without any complications. Overall patient is well and offers no acute concerns. Denies fever, chills, nausea, vomiting, diarrhea, rash, mouth sores, dysuria or any signs of active bleeding. Denies SOB or chest pain. Mild edema of bilateral lower extremities. Remains compliant with post transplant medications. \par \par 10/20 Overall feeling well...tolerating vidazza..no new issues\par \par On 11/4/20, patient presents for a follow up visit. Patients girlfriend present via speaker phone for today's visit. Patient complains of shortness of breath since Monday 11/2/20. Spoke with patient via telephone on 11/2/20, 11/3/20 and yesterday patient felt better. Today is sitting in a wheelchair complaining of shortness of breath. Vital signs stable. States becomes very short of breath when he walks a very short distance. Denies fever, chills, nausea, vomiting, diarrhea, rash, mouth sores, dysuria or any signs of active bleeding. B/L lower extremity edema. Denies chest pain. States was rubbing left eye yesterday and today has left subconjunctival hemorrhage. On lasix 40 mg BID.

## 2020-11-05 NOTE — PHYSICAL EXAM
[Ambulatory and capable of all self care but unable to carry out any work activities] : Status 2- Ambulatory and capable of all self care but unable to carry out any work activities. Up and about more than 50% of waking hours [Normal] : affect appropriate [de-identified] : left subconjunctival hemorrhage [de-identified] : mild B/L LE edema [de-identified] : +BS; abdomen soft, non-distended, non-tender

## 2020-11-05 NOTE — REVIEW OF SYSTEMS
[Lower Ext Edema] : lower extremity edema [Negative] : Allergic/Immunologic [Fever] : no fever [Chills] : no chills [Fatigue] : fatigue [Recent Change In Weight] : ~T recent weight change [Vision Problems] : no vision problems [Chest Pain] : no chest pain [Palpitations] : no palpitations [Leg Claudication] : no intermittent leg claudication [Shortness Of Breath] : shortness of breath [Wheezing] : no wheezing [Cough] : no cough [SOB on Exertion] : shortness of breath during exertion [FreeTextEntry3] : left subconjunctival hemorrhage [FreeTextEntry5] : Mild B/L LE edema

## 2020-11-08 NOTE — CARDIOLOGY SUMMARY
[LVEF ___%] : LVEF [unfilled]% [Severe] : severe LV dysfunction [None] : no mitral regurgitation [___] : [unfilled] [Normal] : normal

## 2020-11-10 NOTE — PHYSICAL EXAM
[General Appearance - Well Developed] : well developed [Normal Appearance] : normal appearance [Well Groomed] : well groomed [General Appearance - Well Nourished] : well nourished [No Deformities] : no deformities [General Appearance - In No Acute Distress] : no acute distress [Normal Conjunctiva] : the conjunctiva exhibited no abnormalities [Eyelids - No Xanthelasma] : the eyelids demonstrated no xanthelasmas [Normal Oral Mucosa] : normal oral mucosa [No Oral Pallor] : no oral pallor [No Oral Cyanosis] : no oral cyanosis [Normal Jugular Venous A Waves Present] : normal jugular venous A waves present [Normal Jugular Venous V Waves Present] : normal jugular venous V waves present [No Jugular Venous Rosario A Waves] : no jugular venous rosario A waves [Respiration, Rhythm And Depth] : normal respiratory rhythm and effort [Exaggerated Use Of Accessory Muscles For Inspiration] : no accessory muscle use [Auscultation Breath Sounds / Voice Sounds] : lungs were clear to auscultation bilaterally [Abdomen Soft] : soft [Abdomen Tenderness] : non-tender [Abdomen Mass (___ Cm)] : no abdominal mass palpated [Abnormal Walk] : normal gait [Gait - Sufficient For Exercise Testing] : the gait was sufficient for exercise testing [Skin Color & Pigmentation] : normal skin color and pigmentation [] : no rash [No Venous Stasis] : no venous stasis [Skin Lesions] : no skin lesions [No Skin Ulcers] : no skin ulcer [No Xanthoma] : no  xanthoma was observed [Oriented To Time, Place, And Person] : oriented to person, place, and time [Affect] : the affect was normal [Mood] : the mood was normal [No Anxiety] : not feeling anxious [5th Left ICS - MCL] : palpated at the 5th LICS in the midclavicular line [Normal] : normal [No Precordial Heave] : no precordial heave was noted [Normal Rate] : normal [Heart Rate ___] : [unfilled] bpm [Rhythm Regular] : regular [Normal S1] : normal S1 [Normal S2] : normal S2 [No Gallop] : no gallop heard [No Murmur] : no murmurs heard [2+] : left 2+ [1+] : left 1+ [No Abnormalities] : the abdominal aorta was not enlarged and no bruit was heard [___ +] : bilateral [unfilled]U+ pretibial pitting edema [FreeTextEntry1] : s/p right big toe amputation [Apical Thrill] : no thrill palpable at the apex [S3] : no S3 [S4] : no S4 [Click] : no click [Pericardial Rub] : no pericardial rub [Right Carotid Bruit] : no bruit heard over the right carotid [Right Femoral Bruit] : no bruit heard over the right femoral artery [Rt] : no varicose veins of the right leg [Lt] : no varicose veins of the left leg

## 2020-11-10 NOTE — HISTORY OF PRESENT ILLNESS
[FreeTextEntry1] : Young presents for evaluation of cardiomyopathy. He has had a complicated medical history and is now s/p bone marrow transplant. He has been treated for AML including consolidation therapy in July of last year. He developed osteomyelitis of the right toe for which he underwent amputation. He has a known cardiomyopathy but had no cardiac complications throughout his hospitalization. He did have neutropenic fevers but no heart failure, dysrhythmias, or other cardiac problems. He is feeling well today and has no specific complaints.\par \par Since 7/19 LV systolic function has been deteriorating. LVEF from 40% to 20%. MR scan of the heart 9/19 demonstrated ejection fraction of 24%. Recently the patient has developed increasing water retention with weight gain, increasing shortness of breath. His Lasix was doubled and he has lost about 6 pounds of water weight. He is feeling better. By mistake  he took 160 mg of Lasix. He is also taking both carvedilol and metoprolol. Blood pressure will be a limiting factor for medical therapy.\par \par Past medical is remarkable for factor V Leiden mutation known in the family, history of DVT, pulmonary embolism, hypertension, tachycardia\par \par Social history is remarkable for smoking for approximately 45 years, stopped last year.  He has worked as a . The patient had been drinking alcohol which he stopped and unfortunately is having a high salt diet\par \par Family history is notable for both parents dying together in an accident. His father had successful bypass surgery at an older age

## 2020-11-10 NOTE — REVIEW OF SYSTEMS
[Feeling Fatigued] : feeling fatigued [see HPI] : see HPI [Negative] : Neurological [Headache] : no headache [Shortness Of Breath] : no shortness of breath [Dyspnea on exertion] : not dyspnea during exertion [Chest Pain] : no chest pain [Lower Ext Edema] : no extremity edema [Palpitations] : no palpitations [Cough] : no cough [Wheezing] : no wheezing [Abdominal Pain] : no abdominal pain [Heartburn] : no heartburn [Dysphagia] : no dysphagia

## 2020-11-10 NOTE — DISCUSSION/SUMMARY
[FreeTextEntry1] : We discussed with his girlfriend who is a nurse about a low-sodium diet and not having any more alcohol. Lifestyle is very important. In addition I am stopping the Toprol increasing carvedilol to 6.25 mg twice a day. He'll go for a BMP to check on his chemistries. Will try to titrate mediaction to maximum as BP will allow.\par \par He'll be evaluated for possible ICD / biventricular pacemaker and may need to see the congestive heart failure [facility] at [place]. He may be a candidate for a left ventricular assist device if medical therapy is limited by blood pressure or is not working and prognosis of acute leukemia is reasonable.  Will discuss with oncologist..\par \par We would see him again in one week to further adjust medication. Beta blocker and add angiotensin receptor blocker..\par \par ADDANDUM:  Discussed case with Dr. Gaytan. At the present time his medical regimen she is going for cure. She feels that with his present counts being low he is not a candidate for any interventional device such as a defibrillator or assist device. Also discuss case with the electrophysiologist who felt that he would not benefit from biventricular pacing

## 2020-11-16 NOTE — HISTORY OF PRESENT ILLNESS
[FreeTextEntry1] : Young presents for evaluation of cardiomyopathy. He has had a complicated medical history and is now s/p bone marrow transplant. He has been treated for AML including consolidation therapy in July of last year. He developed osteomyelitis of the right toe for which he underwent amputation. He has a known cardiomyopathy but had no cardiac complications throughout his hospitalization. He did have neutropenic fevers but no heart failure, dysrhythmias, or other cardiac problems. He is feeling well today and has no specific complaints.\par \par Since 7/19 LV systolic function has been deteriorating. LVEF from 40% to 20%. MR scan of the heart 9/19 demonstrated ejection fraction of 24%. Recently the patient has developed increasing water retention with weight gain, increasing shortness of breath. His Lasix was doubled and he has lost about 6 pounds of water weight. He is feeling better. By mistake  he took 160 mg of Lasix. He is also taking both carvedilol and metoprolol. Blood pressure will be a limiting factor for medical therapy.\par \par Past medical is remarkable for factor V Leiden mutation known in the family, history of DVT, pulmonary embolism, hypertension, tachycardia\par \par Social history is remarkable for smoking for approximately 45 years, stopped last year.  He has worked as a . The patient had been drinking alcohol which he stopped and unfortunately is having a high salt diet\par \par Family history is notable for both parents dying together in an accident. His father had successful bypass surgery at an older age\par \par I discussed his case with both electrophysiologist and the oncologist. Because of his present blood counts he is not considered an ideal candidate for any device or intervention. We are treating him medically.\par \par On the higher dose of carvedilol without the metoprolol he is more awake and less fatigued. He is having no shortness of breath. According to his girlfriend still having one alcohol drink a day and not behaving himself with low salt.

## 2020-11-16 NOTE — DISCUSSION/SUMMARY
[FreeTextEntry1] : His heart rate is better on the carvedilol. His lungs are clear and he has some mild ankle edema. He is going to start valsartan 40 mg once a day. I did warn about the possibility of hypotension. He did have a potassium of 3.4  on diuretic. Hopefully with the valsartan the potassium will improve\par \par If he does have any problems he will call me. Otherwise I will see him  in approximately one week. We went over the importance of no alcohol and a low-salt diet.

## 2020-11-17 PROBLEM — Z01.818 PRE-TRANSPLANT EVALUATION FOR STEM CELL TRANSPLANT: Status: RESOLVED | Noted: 2019-08-12 | Resolved: 2020-01-01

## 2020-11-17 NOTE — PHYSICAL EXAM
[Ambulatory and capable of all self care but unable to carry out any work activities] : Status 2- Ambulatory and capable of all self care but unable to carry out any work activities. Up and about more than 50% of waking hours [Normal] : PERRL, EOMI, no conjunctival infection, anicteric [de-identified] : mild B/L LE edema [de-identified] : +BS; abdomen soft, non-distended, non-tender

## 2020-11-17 NOTE — REVIEW OF SYSTEMS
[Fatigue] : fatigue [Lower Ext Edema] : lower extremity edema [Negative] : Respiratory [Fever] : no fever [Chills] : no chills [Night Sweats] : no night sweats [Vision Problems] : no vision problems [Chest Pain] : no chest pain [Palpitations] : no palpitations [Leg Claudication] : no intermittent leg claudication [Wheezing] : no wheezing [Cough] : no cough [FreeTextEntry5] : Mild B/L LE edema

## 2020-11-17 NOTE — ASSESSMENT
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 with the following comorbidities being managed:\par \par 1) AML\par S/p haplo (son) PSCT on 10/9/19\par 6/5/20 chimerism = 74.3% donor\par 6/23/20 chimerism = 67.7% donor (CD33 = 30% and CD3 = 100% donor)\par 6/25/20 BMbx: VINCE, normal male karyotype \par 7/8/20 chimerism = 57.7% donor (CD3 = 99% and CD33 = 10% donor)\par 7/21/20 chimerism = 59.3% donor (CD3 = 98.3% and CD33 = 7.3% donor)\par 8/13/20 chimerism = 56.7 % donor\par 9/2020 53%\par 9/24/20 chimerism= 55% donor\par 10/8/20  53%\par 10/22/20 chimerism= 38.3 % donor ( CD 3= 99% and CD 33= 1% donor)\par 11/4/20 chimerism= 51.3 % donor ( CD 3 =99% and CD 33= 3% donor)\par Current disease status: VINCE based on 6/25/20 BMbx and ongoing peripheral blood work.\par \par In setting of dropping chimerism, SQ Vidaza 32mg/m2 daily x 5 days every 28 days started 7/27/20 \par    Cycle 2 - 8/24-8/28 \par    Cycle 3 - 9/21-9/25\par     Cycle 4 - 10/19- 10/23\par      Cycle 5- 11/16- 11/20. Cycle 5 started today.\par S/p DLI #1 on 8/13/20\par S/P DLI # 2 on 10/8/20\par DLI # 3 scheduled on 12/3/20\par \par 2) Heme\par ABO compatible transplant: pt and donor are both A pos\par Ongoing anemia and thrombocytopenia, poss poor graft function with dropping CD33 chimerism \par 11/16/20: WBC 9.6  H/H 11.7/37.9  PLT 62   ANC 3.97\par Continue multivitamin and folic acid daily\par \par Hx of recurrent DVT/PE\par Factor V Leiden reportedly runs in the family \par 11/21/19 Doppler US of RUE negative for DVT\par Doppler US of BLEs on 7/2/20 negative for DVT\par Xarelto on hold while pancytopenic, to resume when PLT consistently >100K\par \par 3) ID\par Continue ppx:\par - Acyclovir 400 mg bid \par - Mepron 750 mg bid\par - Voriconazole 200 mg bid - hx of aspergillosis \par Post transplant crowd and dietary restrictions reviewed - in light of covid-19, [facility] recs reviewed and strict restrictions reinforced \par \par CMV viremia\par CMV PCR peaked at 8,581 on 11/20/19\par Valcyte 450mg BID started 11/21/19. Decreased to QD as of 12/17/19. D/c'd 2/14/20 for ongoing thrombocytopenia \par 8/27/20 CMV PCR not detected \par 10/22/20 CMV PCR not detected\par 11/4/20 CMV PCR not detected \par Continue to monitor PCR at ever visit \par \par 4) GVHD\par Skin 0  Liver 0   GI 0 - overall grade 0\par No signs of chronic GVHD at this time\par Off MMF as of 11/22/19 \par Tacrolimus discontinued on 6/15/20\par Prednisone discontinued on 6/15/20\par Reviewed signs/symptoms of GVHD (i.e. rash, mucositis, nausea/vomiting, diarrhea)\par \par aGVHD of skin \par Maculopapular pruritic rash of face, neck, and upper chest (BSA 19%) noted 1/21/20 in setting of tapering prednisone (for FTT) - max skin stage 1, overall grade 1\par 1/21/20: rash on face, neck, and chest (BSA 19%); skin 1, overall grade 1; prednisone increased to 10mg daily\par 2/14/20: rash on neck, chest, and bilateral upper arms (BSA 18%); skin 1, overall grade 1: prednisone increased to 20mg daily\par 2/21/20: rash on neck and chest (10% BSA); skin 1, overall grade 1. Continue prednisone 20mg daily\par 3/12/20: rash on neck and chest (10% BSA); skin 1, overall grade 1. Continue prednisone 15mg daily  \par 3/19/20: rash on neck and chest (10% BSA); skin 1, overall grade 1. Continue prednisone 15 mg daily\par 4/2/20: rash on neck and chest (10% BSA); skin 1, overall grade 1. Continue prednisone 15 mg daily\par 4/9/20: rash resolved, prednisone decreased to 10mg daily\par 4/17/20: rash on neck and chest (10% BSA); skin 1, overall grade 1. Continue prednisone 10mg daily and reinforced compliance with triamcinolone cream bid \par 5/8/20: waxing and waning pruritic erythema of upper back, unsure if GVHD. Continue prednisone 10mg daily at this time\par 5/14/20: mild erythema of neck and upper back ( 5% BSA); skin 1, overall grade 1. Continue prednisone 10 mg daily\par 5/22/20: faint pruritic erythema of upper chest and upper back (9% BSA); skin 1, overall grade 1. Prednisone decreased 5mg daily for drop in chimerism \par 6/5/20: monitor very faint erythema of chest and upper back. Continue triamcinolone 0.1% cream bid PRN rash \par Continue to monitor closely \par 6/23/20: Mild erythema of upper back and bilateral upper extremities.Continue triamcinolone 0.1% cream bid PRN rash \par 7/2/20: pruritic erythema of upper back, chest, and bilateral upper arms (36% BSA). Defer starting oral immunosuppression at this time given drop in chimerism. Start Claritin daily and Pepcid bid. Will attempt to attain insurance auth for Lidex 0.1% cream bid, continue to monitor closely\par 7/8/20: Pruritic erythema of chest and bilateral upper arms (18% BSA). Patient will pay out of pocket today for lidex cream. To be applied BID. Continue claritin and pepcid. \par 7/21/20: no rash \par 7/30/20: erythematous rash of chest (9% BSA), continue Lidex cream bid \par 8/27/20: Erythematous rash of chest and back ( 9% BSA). Continue lidex cream BID\par 9/8/20: erythematous rash of BUEs (18% BSA), continue Lidex cream bid \par RESOLVED\par \par \par 5) GI\par Continue ppx:\par - Protonix 40 mg daily\par - Ursodiol 300 mg bid\par PRN Zofran and Reglan for nausea/vomiting\par \par 6) Cardiac\par HTN \par Metoprolol succinate 25 mg daily- discontinued by cardiology\par Carvedilol increased to 6.25 mg BID\par Stared on valsartan 40 mg daily per cardiology on 11/16/20\par \par Cardiomyopathy\par ECHO completed on  6/24/20, ordered by cardiology\par Continue lasix 40 mg BID \par ECHO completed on 11/5/20- Left ventricular ejection fraction of 20%. Continues to follow up with cardiology. Has a follow up appt today 11/16/20.\par \par Shortness of breath\par STAT chest xray completed on 11/4/20- heart enlarged with small bilateral pleural effusions\par Increased lasix to 80 mg BID on 11/4/20 for two days. Then decrease lasix to 40 mg BID.\par Echo scheduled for 11/5/20. Patient and patients girlfriend aware patient needs to see Dr Lyn. \par Patient is aware he cannot have any  ETOH . Dr Gaytan met with patient and explained patient cannot drink ETOH. Contributing to fluid overload. \par RESOLVED\par \par \par 7) Other\par BPH - continue finasteride 5mg daily and tamsulosin 0.4mg daily \par Anxiety/Depression - continue Zoloft 50mg daily\par Insomnia - continue PRN Xanax 0.5-1mg qhs\par \par 8) Plan/Dispo\par Pt educated regarding plan of care, all questions/concerns addressed\par Monitor for GVHD\par Cycle 5 of Vidaza started today.\par DLI Scheduled for 12/3/20.\par Continue to follow up with Cardiology\par Advised patient to not drink alcohol as instructed. Continue low sodium diet. \par F/u with Dr Gaytan on 12/3/20.

## 2020-11-17 NOTE — HISTORY OF PRESENT ILLNESS
[de-identified] : Mr. Delong is a 60 y/o male, with a previously diagnosed acute myeloid leukemia. A bone marrow biopsy from 7/3 revealed Acute myeloid leukemia (AML). FISH - report shows a population of aberrant myeloid blasts. He is currently being treated by Dr. Coelho for high risk AML with HIDAC consolidation chemotherapy. He was referred by Dr. Coelho for an initial consultation of a Haplo- transplant. \par \par The patient has a history of multiple blood clots - factor V Leiden runs in the family. He was diagnosed with sleep apnea, but refuses to use CPAP. He is a former smoker, 40 years. He also has a past medical history of PE, HTN, cardiomyopathy. He recently underwent an amputation of the right first toe due to an infection.\par \par S/p hospitalization at [facility] 10/3/19 - 11/1/19 for haplo (son) SCT. \par Upon admission, a TLC was placed in IR. Mr. Delong received IV fluid hydration, pain management, nutritional support, anxiolysis, antiemetics, and antiviral,  antifungal, antibacterial, GI, PCP and VOD prophylaxis. When his ANC dropped  below 1000, he was started on prophylactic Cefepime. Labs were monitored on a daily basis and he received transfusional support and electrolyte repletion as needed. \par \par While in patient, Mr. Delong was continued on his Voriconazole for history of aspergillosis. \par \par S/p hospitalization at [facility] 10/3/19 - 11/1/19 for haplo (son) SCT. \par Upon admission, a TLC was placed in IR. Mr. Delong received IV fluid hydration, pain management, nutritional support, anxiolysis, antiemetics, and antiviral, antifungal, antibacterial, GI, PCP and VOD prophylaxis. When his ANC dropped below 1000, he was started on prophylactic Cefepime. Labs were monitored on a daily basis and he received transfusional support and electrolyte repletion as needed. \par \par While in patient, Mr. Delong was continued on his Voriconazole for history of aspergillosis. \par \par During admission, Mr. Delong had pancytopenia related to the high dose chemotherapy prep regimen. He also experienced neutropenic fevers. When he became febrile, blood and urine cultures were sent and a CXR was completed which were unremarkable. \par \par On 10/9/2019, following premedications, pt received 250 ml of allogeneic, related, haplo, fresh, mobilized HPC apheresis over 30-60 minutes. \par Cell counts as follows: \par \par Total MNCs (x10^8/kg) = 5.17 \par CD 34 cells (x10^6/kg) = 7.34 \par CD 3 Cells (x 10^7/kg) = 19.81 \par Cell viability (%) = 100 \par \par Pt tolerated infusion without any adverse reaction or complications. \par \par Engraftment was noted on 10/28/2019. Daily Zarxio was discontinued, as was Cefepime given negative cultures. Mr. Delong was discharged home to f/u at the [facility].  [de-identified] : On 9/24/20 visit, day +42  of DLI #1 today. Overall feeling well. Occasionally feels off balance but no falls, stable. Adequate PO intake and hydration. Notes SOB with lying supine over the past couple days, currently taking Lasix 40mg daily. Ongoing intermittently pruritic GVHD rash of BUEs improved,  Stable BLE edema. Compliant with post transplant meds and restrictions. Denies fever, chills, headache, dizziness, blurred vision, mucositis/odynophagia, chest pain, cough, nausea/vomiting, diarrhea/constipation, abdominal pain, dysuria, bleeding, pain\par \par On 10/8/20 visit, examined patient in treatment room today. Vital signs reviewed in treatment room. Received DLI # 2 today without any complications. Overall patient is well and offers no acute concerns. Denies fever, chills, nausea, vomiting, diarrhea, rash, mouth sores, dysuria or any signs of active bleeding. Denies SOB or chest pain. Mild edema of bilateral lower extremities. Remains compliant with post transplant medications. \par \par 10/20 Overall feeling well...tolerating vidazza..no new issues\par \par On 11/4/20, patient presents for a follow up visit. Patients girlfriend present via speaker phone for today's visit. Patient complains of shortness of breath since Monday 11/2/20. Spoke with patient via telephone on 11/2/20, 11/3/20 and yesterday patient felt better. Today is sitting in a wheelchair complaining of shortness of breath. Vital signs stable. States becomes very short of breath when he walks a very short distance. Denies fever, chills, nausea, vomiting, diarrhea, rash, mouth sores, dysuria or any signs of active bleeding. B/L lower extremity edema. Denies chest pain. States was rubbing left eye yesterday and today has left subconjunctival hemorrhage. On lasix 40 mg BID. \par \par On 11/16/20, patient presents for a follow up visit. Examined patient in treatment room today. Vital signs reviewed. Overall patient is feeling much better compared to two weeks ago. Admits two weeks ago took the incorrect dose of lasix by accident by one. Has been taking lasix 40 mg BID since. Starting cycle 5 of vidaza today. Completed echocardiogram on 11/5/20 and has been following up with cardiology. Metoprolol was discontinued and carvediol was increased per cardiology. Overall feels well and offers no acute concerns. Mild edema of bilateral lower extremities. Denies fever, chills, nausea, vomiting, diarrhea, rash, mouth sores, dysuria or any signs of active bleeding. Denies SOB or chest pain. Has been trying to eat a low sodium diet. Admits had a glass of wine last night despite Dr Gaytan's instructions not to drink any alcohol.

## 2020-11-25 NOTE — DISCUSSION/SUMMARY
[FreeTextEntry1] : The patient will stay on his valsartan and carvedilol. Blood pressure is low normal but he is having no symptoms of lightheadedness. If he starts to get lightheaded we would need reduce the dose of medication. We again discussed the importance of good hydration, low-salt diet, and no alcohol.\par \par He gets blood work at the oncologist I asked him to see if they would do a BMP to check on his renal function and electrolytes. If he does have any symptoms he would call me. If all is well I will see him in one month.

## 2020-11-25 NOTE — HISTORY OF PRESENT ILLNESS
[FreeTextEntry1] : Young presents for evaluation of cardiomyopathy. He has had a complicated medical history and is now s/p bone marrow transplant. He has been treated for AML including consolidation therapy in July of last year. He developed osteomyelitis of the right toe for which he underwent amputation. He has a known cardiomyopathy but had no cardiac complications throughout his hospitalization. He did have neutropenic fevers but no heart failure, dysrhythmias, or other cardiac problems. He is feeling well today and has no specific complaints.\par \par Since 7/19 LV systolic function has been deteriorating. LVEF from 40% to 20%. MR scan of the heart 9/19 demonstrated ejection fraction of 24%. Recently the patient has developed increasing water retention with weight gain, increasing shortness of breath. His Lasix was doubled and he has lost about 6 pounds of water weight. He is feeling better. By mistake  he took 160 mg of Lasix. He is also taking both carvedilol and metoprolol. Blood pressure will be a limiting factor for medical therapy.\par \par Past medical is remarkable for factor V Leiden mutation known in the family, history of DVT, pulmonary embolism, hypertension, tachycardia\par \par Social history is remarkable for smoking for approximately 45 years, stopped last year.  He has worked as a . The patient had been drinking alcohol which he stopped and unfortunately is having a high salt diet\par \par Family history is notable for both parents dying together in an accident. His father had successful bypass surgery at an older age\par \par I discussed his case with both electrophysiologist and the oncologist. Because of his present blood counts he is not considered an ideal candidate for any device or intervention. We are treating him medically.\par \par On the higher dose of carvedilol without the metoprolol he is more awake and less fatigued. He is having no shortness of breath. According to his girlfriend still having one alcohol drink a day and not behaving himself with low salt. Last visit I started valsartan 40 mg once a day. Is tolerating medicine well without side effects her blood pressures running in the low 90s but is not lightheaded. Breathing is good. He is cutting back on his alcohol. He had 2 glasses of wine last week. He is trying to watch his salt diet

## 2020-12-07 PROBLEM — Z86.72 HISTORY OF DEEP VEIN THROMBOPHLEBITIS OF LOWER EXTREMITY: Status: RESOLVED | Noted: 2018-02-02 | Resolved: 2020-01-01

## 2020-12-07 PROBLEM — F17.200 CURRENT EVERY DAY SMOKER: Status: RESOLVED | Noted: 2018-02-02 | Resolved: 2020-01-01

## 2020-12-07 NOTE — ASSESSMENT
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 with the following comorbidities being managed:\par \par 1) AML\par S/p haplo (son) PSCT on 10/9/19\par 6/5/20 chimerism = 74.3% donor\par 6/23/20 chimerism = 67.7% donor (CD33 = 30% and CD3 = 100% donor)\par 6/25/20 BMbx: VINCE, normal male karyotype \par 7/8/20 chimerism = 57.7% donor (CD3 = 99% and CD33 = 10% donor)\par 7/21/20 chimerism = 59.3% donor (CD3 = 98.3% and CD33 = 7.3% donor)\par 8/13/20 chimerism = 56.7 % donor\par 9/2020 53%\par 9/24/20 chimerism= 55% donor\par 10/8/20  53%\par 10/22/20 chimerism= 38.3 % donor ( CD 3= 99% and CD 33= 1% donor)\par 11/4/20 chimerism= 51.3 % donor ( CD 3 =99% and CD 33= 3% donor)...end now 47%\par Current disease status: VINCE based on 6/25/20 BMbx and ongoing peripheral blood work.\par \par In setting of dropping chimerism, SQ Vidaza 32mg/m2 daily x 5 days every 28 days started 7/27/20 \par    Cycle 2 - 8/24-8/28 \par    Cycle 3 - 9/21-9/25\par     Cycle 4 - 10/19- 10/23\par      Cycle 5- 11/16- 11/20. \par S/p DLI #1 on 8/13/20\par S/P DLI # 2 on 10/8/20\par DLI # 3  12/3/20\par \par 2) Heme\par ABO compatible transplant: pt and donor are both A pos\par Ongoing anemia and thrombocytopenia, poss poor graft function with dropping CD33 chimerism \par 11/16/20: WBC 9.6  H/H 11.7/37.9  PLT 62   ANC 3.97\par Continue multivitamin and folic acid daily\par \par Hx of recurrent DVT/PE\par Factor V Leiden reportedly runs in the family \par 11/21/19 Doppler US of RUE negative for DVT\par Doppler US of BLEs on 7/2/20 negative for DVT\par Xarelto on hold while pancytopenic, to resume when PLT consistently >100K\par \par 3) ID\par Continue ppx:\par - Acyclovir 400 mg bid \par - Mepron 750 mg bid\par - Voriconazole 200 mg bid - hx of aspergillosis \par Post transplant crowd and dietary restrictions reviewed - in light of covid-19, [facility] recs reviewed and strict restrictions reinforced \par \par CMV viremia\par CMV PCR peaked at 8,581 on 11/20/19\par Valcyte 450mg BID started 11/21/19. Decreased to QD as of 12/17/19. D/c'd 2/14/20 for ongoing thrombocytopenia \par 8/27/20 CMV PCR not detected \par 10/22/20 CMV PCR not detected\par 11/4/20 CMV PCR not detected \par Continue to monitor PCR at ever visit \par \par 4) GVHD\par Skin 0  Liver 0   GI 0 - overall grade 0\par No signs of chronic GVHD at this time\par Off MMF as of 11/22/19 \par Tacrolimus discontinued on 6/15/20\par Prednisone discontinued on 6/15/20\par Reviewed signs/symptoms of GVHD (i.e. rash, mucositis, nausea/vomiting, diarrhea)\par \par aGVHD of skin \par Maculopapular pruritic rash of face, neck, and upper chest (BSA 19%) noted 1/21/20 in setting of tapering prednisone (for FTT) - max skin stage 1, overall grade 1\par 1/21/20: rash on face, neck, and chest (BSA 19%); skin 1, overall grade 1; prednisone increased to 10mg daily\par 2/14/20: rash on neck, chest, and bilateral upper arms (BSA 18%); skin 1, overall grade 1: prednisone increased to 20mg daily\par 2/21/20: rash on neck and chest (10% BSA); skin 1, overall grade 1. Continue prednisone 20mg daily\par 3/12/20: rash on neck and chest (10% BSA); skin 1, overall grade 1. Continue prednisone 15mg daily  \par 3/19/20: rash on neck and chest (10% BSA); skin 1, overall grade 1. Continue prednisone 15 mg daily\par 4/2/20: rash on neck and chest (10% BSA); skin 1, overall grade 1. Continue prednisone 15 mg daily\par 4/9/20: rash resolved, prednisone decreased to 10mg daily\par 4/17/20: rash on neck and chest (10% BSA); skin 1, overall grade 1. Continue prednisone 10mg daily and reinforced compliance with triamcinolone cream bid \par 5/8/20: waxing and waning pruritic erythema of upper back, unsure if GVHD. Continue prednisone 10mg daily at this time\par 5/14/20: mild erythema of neck and upper back ( 5% BSA); skin 1, overall grade 1. Continue prednisone 10 mg daily\par 5/22/20: faint pruritic erythema of upper chest and upper back (9% BSA); skin 1, overall grade 1. Prednisone decreased 5mg daily for drop in chimerism \par 6/5/20: monitor very faint erythema of chest and upper back. Continue triamcinolone 0.1% cream bid PRN rash \par Continue to monitor closely \par 6/23/20: Mild erythema of upper back and bilateral upper extremities.Continue triamcinolone 0.1% cream bid PRN rash \par 7/2/20: pruritic erythema of upper back, chest, and bilateral upper arms (36% BSA). Defer starting oral immunosuppression at this time given drop in chimerism. Start Claritin daily and Pepcid bid. Will attempt to attain insurance auth for Lidex 0.1% cream bid, continue to monitor closely\par 7/8/20: Pruritic erythema of chest and bilateral upper arms (18% BSA). Patient will pay out of pocket today for lidex cream. To be applied BID. Continue claritin and pepcid. \par 7/21/20: no rash \par 7/30/20: erythematous rash of chest (9% BSA), continue Lidex cream bid \par 8/27/20: Erythematous rash of chest and back ( 9% BSA). Continue lidex cream BID\par 9/8/20: erythematous rash of BUEs (18% BSA), continue Lidex cream bid \par RESOLVED\par \par \par 5) GI\par Continue ppx:\par - Protonix 40 mg daily\par - Ursodiol 300 mg bid\par PRN Zofran and Reglan for nausea/vomiting\par \par 6) Cardiac\par HTN \par Metoprolol succinate 25 mg daily- discontinued by cardiology\par Carvedilol increased to 6.25 mg BID\par Stared on valsartan 40 mg daily per cardiology on 11/16/20\par \par Cardiomyopathy\par ECHO completed on  6/24/20, ordered by cardiology\par Continue lasix 40 mg BID \par ECHO completed on 11/5/20- Left ventricular ejection fraction of 20%. Continues to follow up with cardiology. Has a follow up appt today 11/16/20.\par \par Shortness of breath\par STAT chest xray completed on 11/4/20- heart enlarged with small bilateral pleural effusions\par Increased lasix to 80 mg BID on 11/4/20 for two days. Then decrease lasix to 40 mg BID.\par Echo scheduled for 11/5/20. Patient and patients girlfriend aware patient needs to see Dr Lyn. \par Patient is aware he cannot have any  ETOH . Dr Gaytan met with patient and explained patient cannot drink ETOH. Contributing to fluid overload. \par RESOLVED\par \par \par 7) Other\par BPH - continue finasteride 5mg daily and tamsulosin 0.4mg daily \par Anxiety/Depression - continue Zoloft 50mg daily\par Insomnia - continue PRN Xanax 0.5-1mg qhs\par \par 8) Plan/Dispo\par Pt educated regarding plan of care, all questions/concerns addressed\par Monitor for GVHD\par Cycle 6 due\par DLI  #3  12/3/20.\par Continue to follow up with Cardiology\par Advised patient to not drink alcohol as instructed. Continue low sodium diet. \par F/u 2 weeks\par \par \par Infusion Note:\par 100 ml of thawed, diluted hpc, apheresis infused with premeds for purposes for DLI\par total mnc 0.64 times 10 to the 8 per kg\par cd3 2.4 times 10 to the 7th per kg\par viability 80%\par no complications

## 2020-12-07 NOTE — HISTORY OF PRESENT ILLNESS
[de-identified] : Mr. Delong is a 58 y/o male, with a previously diagnosed acute myeloid leukemia. A bone marrow biopsy from 7/3 revealed Acute myeloid leukemia (AML). FISH - report shows a population of aberrant myeloid blasts. He is currently being treated by Dr. Coelho for high risk AML with HIDAC consolidation chemotherapy. He was referred by Dr. Coelho for an initial consultation of a Haplo- transplant. \par \par The patient has a history of multiple blood clots - factor V Leiden runs in the family. He was diagnosed with sleep apnea, but refuses to use CPAP. He is a former smoker, 40 years. He also has a past medical history of PE, HTN, cardiomyopathy. He recently underwent an amputation of the right first toe due to an infection.\par \par S/p hospitalization at [facility] 10/3/19 - 11/1/19 for haplo (son) SCT. \par Upon admission, a TLC was placed in IR. Mr. Delong received IV fluid hydration, pain management, nutritional support, anxiolysis, antiemetics, and antiviral,  antifungal, antibacterial, GI, PCP and VOD prophylaxis. When his ANC dropped  below 1000, he was started on prophylactic Cefepime. Labs were monitored on a daily basis and he received transfusional support and electrolyte repletion as needed. \par \par While in patient, Mr. Delong was continued on his Voriconazole for history of aspergillosis. \par \par S/p hospitalization at [facility] 10/3/19 - 11/1/19 for haplo (son) SCT. \par Upon admission, a TLC was placed in IR. Mr. Delong received IV fluid hydration, pain management, nutritional support, anxiolysis, antiemetics, and antiviral, antifungal, antibacterial, GI, PCP and VOD prophylaxis. When his ANC dropped below 1000, he was started on prophylactic Cefepime. Labs were monitored on a daily basis and he received transfusional support and electrolyte repletion as needed. \par \par While in patient, Mr. Delong was continued on his Voriconazole for history of aspergillosis. \par \par During admission, Mr. Delong had pancytopenia related to the high dose chemotherapy prep regimen. He also experienced neutropenic fevers. When he became febrile, blood and urine cultures were sent and a CXR was completed which were unremarkable. \par \par On 10/9/2019, following premedications, pt received 250 ml of allogeneic, related, haplo, fresh, mobilized HPC apheresis over 30-60 minutes. \par Cell counts as follows: \par \par Total MNCs (x10^8/kg) = 5.17 \par CD 34 cells (x10^6/kg) = 7.34 \par CD 3 Cells (x 10^7/kg) = 19.81 \par Cell viability (%) = 100 \par \par Pt tolerated infusion without any adverse reaction or complications. \par \par Engraftment was noted on 10/28/2019. Daily Zarxio was discontinued, as was Cefepime given negative cultures. Mr. Delong was discharged home to f/u at the [facility].  [de-identified] : On 9/24/20 visit, day +42  of DLI #1 today. Overall feeling well. Occasionally feels off balance but no falls, stable. Adequate PO intake and hydration. Notes SOB with lying supine over the past couple days, currently taking Lasix 40mg daily. Ongoing intermittently pruritic GVHD rash of BUEs improved,  Stable BLE edema. Compliant with post transplant meds and restrictions. Denies fever, chills, headache, dizziness, blurred vision, mucositis/odynophagia, chest pain, cough, nausea/vomiting, diarrhea/constipation, abdominal pain, dysuria, bleeding, pain\par \par On 10/8/20 visit, examined patient in treatment room today. Vital signs reviewed in treatment room. Received DLI # 2 today without any complications. Overall patient is well and offers no acute concerns. Denies fever, chills, nausea, vomiting, diarrhea, rash, mouth sores, dysuria or any signs of active bleeding. Denies SOB or chest pain. Mild edema of bilateral lower extremities. Remains compliant with post transplant medications. \par \par 10/20 Overall feeling well...tolerating vidazza..no new issues\par \par On 11/4/20, patient presents for a follow up visit. Patients girlfriend present via speaker phone for today's visit. Patient complains of shortness of breath since Monday 11/2/20. Spoke with patient via telephone on 11/2/20, 11/3/20 and yesterday patient felt better. Today is sitting in a wheelchair complaining of shortness of breath. Vital signs stable. States becomes very short of breath when he walks a very short distance. Denies fever, chills, nausea, vomiting, diarrhea, rash, mouth sores, dysuria or any signs of active bleeding. B/L lower extremity edema. Denies chest pain. States was rubbing left eye yesterday and today has left subconjunctival hemorrhage. On lasix 40 mg BID. \par \par On 11/16/20, patient presents for a follow up visit. Examined patient in treatment room today. Vital signs reviewed. Overall patient is feeling much better compared to two weeks ago. Admits two weeks ago took the incorrect dose of lasix by accident by one. Has been taking lasix 40 mg BID since. Starting cycle 5 of vidaza today. Completed echocardiogram on 11/5/20 and has been following up with cardiology. Metoprolol was discontinued and carvediol was increased per cardiology. Overall feels well and offers no acute concerns. Mild edema of bilateral lower extremities. Denies fever, chills, nausea, vomiting, diarrhea, rash, mouth sores, dysuria or any signs of active bleeding. Denies SOB or chest pain. Has been trying to eat a low sodium diet. Admits had a glass of wine last night despite Dr Gaytan's instructions not to drink any alcohol. \par \par 12/3/20  Pt stopped drinking..cardiac meds adjusted...no n/v/d...no fever..in good spirits....sAhlyn on phone

## 2020-12-07 NOTE — PHYSICAL EXAM
[Ambulatory and capable of all self care but unable to carry out any work activities] : Status 2- Ambulatory and capable of all self care but unable to carry out any work activities. Up and about more than 50% of waking hours [Normal] : affect appropriate [de-identified] : mild B/L LE edema [de-identified] : +BS; abdomen soft, non-distended, non-tender

## 2020-12-07 NOTE — REVIEW OF SYSTEMS
[Fatigue] : fatigue [Lower Ext Edema] : lower extremity edema [Negative] : Allergic/Immunologic [Fever] : no fever [Chills] : no chills [Night Sweats] : no night sweats [Vision Problems] : no vision problems [Chest Pain] : no chest pain [Palpitations] : no palpitations [Leg Claudication] : no intermittent leg claudication [Wheezing] : no wheezing [Cough] : no cough [FreeTextEntry5] : Mild B/L LE edema

## 2020-12-19 PROBLEM — R06.02 SOB (SHORTNESS OF BREATH) ON EXERTION: Status: ACTIVE | Noted: 2019-11-22

## 2020-12-19 PROBLEM — R53.83 FATIGUE: Status: ACTIVE | Noted: 2020-01-01

## 2020-12-19 NOTE — HISTORY OF PRESENT ILLNESS
[de-identified] : Mr. Delong is a 58 y/o male, with a previously diagnosed acute myeloid leukemia. A bone marrow biopsy from 7/3 revealed Acute myeloid leukemia (AML). FISH - report shows a population of aberrant myeloid blasts. He is currently being treated by Dr. Coelho for high risk AML with HIDAC consolidation chemotherapy. He was referred by Dr. Coelho for an initial consultation of a Haplo- transplant. \par \par The patient has a history of multiple blood clots - factor V Leiden runs in the family. He was diagnosed with sleep apnea, but refuses to use CPAP. He is a former smoker, 40 years. He also has a past medical history of PE, HTN, cardiomyopathy. He recently underwent an amputation of the right first toe due to an infection.\par \par S/p hospitalization at [facility] 10/3/19 - 11/1/19 for haplo (son) SCT. \par Upon admission, a TLC was placed in IR. Mr. Delong received IV fluid hydration, pain management, nutritional support, anxiolysis, antiemetics, and antiviral,  antifungal, antibacterial, GI, PCP and VOD prophylaxis. When his ANC dropped  below 1000, he was started on prophylactic Cefepime. Labs were monitored on a daily basis and he received transfusional support and electrolyte repletion as needed. \par \par While in patient, Mr. Delong was continued on his Voriconazole for history of aspergillosis. \par \par S/p hospitalization at [facility] 10/3/19 - 11/1/19 for haplo (son) SCT. \par Upon admission, a TLC was placed in IR. Mr. Delong received IV fluid hydration, pain management, nutritional support, anxiolysis, antiemetics, and antiviral, antifungal, antibacterial, GI, PCP and VOD prophylaxis. When his ANC dropped below 1000, he was started on prophylactic Cefepime. Labs were monitored on a daily basis and he received transfusional support and electrolyte repletion as needed. \par \par While in patient, Mr. Delong was continued on his Voriconazole for history of aspergillosis. \par \par During admission, Mr. Delong had pancytopenia related to the high dose chemotherapy prep regimen. He also experienced neutropenic fevers. When he became febrile, blood and urine cultures were sent and a CXR was completed which were unremarkable. \par \par On 10/9/2019, following premedications, pt received 250 ml of allogeneic, related, haplo, fresh, mobilized HPC apheresis over 30-60 minutes. \par Cell counts as follows: \par \par Total MNCs (x10^8/kg) = 5.17 \par CD 34 cells (x10^6/kg) = 7.34 \par CD 3 Cells (x 10^7/kg) = 19.81 \par Cell viability (%) = 100 \par \par Pt tolerated infusion without any adverse reaction or complications. \par \par Engraftment was noted on 10/28/2019. Daily Zarxio was discontinued, as was Cefepime given negative cultures. Mr. Delong was discharged home to f/u at the [facility].  [de-identified] : On 12/16/20 visit, presents for Vidaza this week 12/14-12/19. Overall feeling OK. Ongoing fatigue. Stable SOB with exertion. Reports adequate PO intake and hydration. Drinking 1 glass of wine per week. Intermittent pruritic rash of upper back, intermittently using steroid cream. Trace BLE edema waxes and wanes, currently taking Lastix 40mg bid. Compliant with post transplant meds and restrictions. Denies fever, chills, headache, dizziness, blurred vision, mucositis/odynophagia, chest pain, cough, nausea/vomiting, diarrhea/constipation, abdominal pain, dysuria, bleeding, pain\par

## 2020-12-19 NOTE — PHYSICAL EXAM
[Ambulatory and capable of all self care but unable to carry out any work activities] : Status 2- Ambulatory and capable of all self care but unable to carry out any work activities. Up and about more than 50% of waking hours [Normal] : affect appropriate [de-identified] : trace BLE edema [de-identified] : +BS; abdomen soft, non-distended, non-tender [de-identified] : intermittent erythema of upper back  [No] : No [No active (erythematous_ GVHD rash)] : Skin: No active (erythematous_ GVHD rash) [< 2 mg/dl] : Liver: < 2 mg/dl [No or intermittent] : Upper GI: No or intermittent nausea, vomiting or anorexia [<500 ml/day or < 3 episodes/day] : Lower GI (stool output/day): <500 ml/day or <3 episodes/day [FreeTextEntry1] : 10/9/19

## 2020-12-19 NOTE — ASSESSMENT
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 with the following comorbidities being managed:\par \par 1) AML\par S/p haplo (son) PSCT on 10/9/19\par 6/5/20 chimerism = 74.3% donor\par 6/23/20 chimerism = 67.7% donor (CD33 = 30% and CD3 = 100% donor)\par 6/25/20 BMbx: VINCE, normal male karyotype \par 7/8/20 chimerism = 57.7% donor (CD3 = 99% and CD33 = 10% donor)\par 7/21/20 chimerism = 59.3% donor (CD3 = 98.3% and CD33 = 7.3% donor)\par 8/13/20 chimerism = 56.7 % donor\par 9/2020 chimerism = 53%\par 9/24/20 chimerism = 55% donor\par 10/8/20 chimerism = 53%\par 10/22/20 chimerism = 38.3 % donor (CD 3 = 99% and CD 33= 1% donor)\par 11/4/20 chimerism = 51.3 % donor (CD 3 = 99% and CD 33= 3% donor)\par 12/3/20 chimerism = 41% donor (CD3 = 99% and CD33 = 1% donor)\par Current disease status: VINCE based on 6/25/20 BMbx and ongoing peripheral blood work.\par \par In setting of dropping chimerism, SQ Vidaza 32mg/m2 daily x 5 days every 28 days started 7/27/20 \par    Cycle 2 - 8/24-8/28 \par    Cycle 3 - 9/21-9/25\par    Cycle 4 - 10/19 - 10/23\par    Cycle 5 - 11/16 - 11/20\par    Cycle 6 - 12/14 -12/19\par S/p DLI #1 on 8/13/20\par S/P DLI #2 on 10/8/20\par S/p DLI #3 on 12/3/20\par \par 2) Heme\par ABO compatible transplant: pt and donor are both A pos\par Ongoing anemia and thrombocytopenia, poor graft function with dropping CD33 chimerism \par    12/14/20:   WBC 7.89   Hgb 10.4   PLT 60\par Continue multivitamin and folic acid daily\par \par Hx of recurrent DVT/PE\par Factor V Leiden reportedly runs in the family \par 11/21/19 Doppler US of RUE negative for DVT\par Doppler US of BLEs on 7/2/20 negative for DVT\par Xarelto on hold while pancytopenic, to resume when PLT consistently >100K\par \par 3) ID\par Continue ppx:\par - Acyclovir 400 mg bid \par - Voriconazole 200 mg bid - hx of aspergillosis \par Mepron d/c'd 12/16/20 \par Post transplant crowd and dietary restrictions reviewed - in light of covid-19, [facility] recs reviewed and strict restrictions reinforced \par \par CMV viremia\par CMV PCR peaked at 8,581 on 11/20/19\par Valcyte 450mg BID started 11/21/19. Decreased to QD as of 12/17/19. D/c'd 2/14/20 for ongoing thrombocytopenia \par 12/14/20 CMV PCR not detected \par Continue to monitor PCR at ever visit \par \par 4) GVHD\par Skin 0  Liver 0   GI 0 - overall grade 0\par No signs of chronic GVHD at this time\par Off MMF as of 11/22/19 \par Tacrolimus discontinued on 6/15/20\par Prednisone discontinued on 6/15/20\par Reviewed signs/symptoms of GVHD (i.e. rash, mucositis, nausea/vomiting, diarrhea)\par \par aGVHD of skin \par Maculopapular pruritic rash of face, neck, and upper chest (BSA 19%) noted 1/21/20 in setting of tapering prednisone (for FTT) - max skin stage 1, overall grade 1\par 1/21/20: rash on face, neck, and chest (BSA 19%); skin 1, overall grade 1; prednisone increased to 10mg daily\par 2/14/20: rash on neck, chest, and bilateral upper arms (BSA 18%); skin 1, overall grade 1: prednisone increased to 20mg daily\par 2/21/20: rash on neck and chest (10% BSA); skin 1, overall grade 1. Continue prednisone 20mg daily\par 3/12/20: rash on neck and chest (10% BSA); skin 1, overall grade 1. Continue prednisone 15mg daily  \par 3/19/20: rash on neck and chest (10% BSA); skin 1, overall grade 1. Continue prednisone 15 mg daily\par 4/2/20: rash on neck and chest (10% BSA); skin 1, overall grade 1. Continue prednisone 15 mg daily\par 4/9/20: rash resolved, prednisone decreased to 10mg daily\par 4/17/20: rash on neck and chest (10% BSA); skin 1, overall grade 1. Continue prednisone 10mg daily and reinforced compliance with triamcinolone cream bid \par 5/8/20: waxing and waning pruritic erythema of upper back, unsure if GVHD. Continue prednisone 10mg daily at this time\par 5/14/20: mild erythema of neck and upper back ( 5% BSA); skin 1, overall grade 1. Continue prednisone 10 mg daily\par 5/22/20: faint pruritic erythema of upper chest and upper back (9% BSA); skin 1, overall grade 1. Prednisone decreased 5mg daily for drop in chimerism \par 6/5/20: monitor very faint erythema of chest and upper back. Continue triamcinolone 0.1% cream bid PRN rash \par Continue to monitor closely \par 6/23/20: Mild erythema of upper back and bilateral upper extremities.Continue triamcinolone 0.1% cream bid PRN rash \par 7/2/20: pruritic erythema of upper back, chest, and bilateral upper arms (36% BSA). Defer starting oral immunosuppression at this time given drop in chimerism. Start Claritin daily and Pepcid bid. Will attempt to attain insurance auth for Lidex 0.1% cream bid, continue to monitor closely\par 7/8/20: Pruritic erythema of chest and bilateral upper arms (18% BSA). Patient will pay out of pocket today for lidex cream. To be applied BID. Continue claritin and pepcid. \par 7/21/20: no rash \par 7/30/20: erythematous rash of chest (9% BSA), continue Lidex cream bid \par 8/27/20: Erythematous rash of chest and back ( 9% BSA). Continue lidex cream BID\par 9/8/20: erythematous rash of BUEs (18% BSA), continue Lidex cream bid \par Continue PRN Lidex cream for rash flares\par \par 5) GI\par Continue ppx:\par - Protonix 40 mg daily\par - Ursodiol 300 mg bid\par PRN Zofran and Reglan for nausea/vomiting\par \par 6) Cardiac\par HTN \par Metoprolol succinate 25 mg daily - discontinued by cardiology\par Continue meds as below\par Soft BP though asymptomatic\par \par Cardiomyopathy\par ECHO completed on  6/24/20, ordered by cardiology\par ECHO completed on 11/5/20 - Left ventricular ejection fraction of 20%\par Continue meds as prescribed:\par - Carvedilol 6.25mg bid\par - Valsartan 40mg daily\par - Lasix 40mg bid\par Continue f/u with cardiology \par \par Shortness of breath\par STAT chest xray completed on 11/4/20 - heart enlarged with small bilateral pleural effusions\par Increased lasix to 80 mg BID on 11/4/20 for two days. Then decrease lasix to 40 mg BID.\par Echo scheduled for 11/5/20. Patient and patients girlfriend aware patient needs to see Dr Lyn. \par Patient is aware he cannot have any  ETOH . Dr Gaytan met with patient and explained patient cannot drink ETOH. Contributing to fluid overload. \par RESOLVED\par \par 7) Other\par BPH - continue finasteride 5mg daily and tamsulosin 0.4mg daily \par Anxiety/Depression - continue Zoloft 50mg daily\par Insomnia - continue PRN Xanax 0.5-1mg qhs\par Pruritus - continue Pepcid 20mg bid and Zyrtec 10mg daily \par \par 8) Plan/Dispo\par Pt educated regarding plan of care, all questions/concerns addressed\par Continue f/u every 2wks\par F/u with NP on 12/29/20\par

## 2020-12-19 NOTE — REASON FOR VISIT
[Follow-Up Visit] : a follow-up visit for [Acute Myeloid Leukemia] : acute myeloid leukemia [FreeTextEntry2] : s/p haplo (son) PBSCT on 10/9/19

## 2020-12-22 NOTE — DISCUSSION/SUMMARY
[FreeTextEntry1] : The patient's cardiac status is stable. His fluid status is okay and his blood pressure is low normal. He'll continue on his present medication and get blood work next month to check on his kidney. Based on his blood pressure and kidney function, I think we are on maximum medication.  If he has any additional symptoms he will call me. I would see him in a month. We did go over his lifestyle, his medication, and his blood work, and I answered his questions.

## 2020-12-22 NOTE — HISTORY OF PRESENT ILLNESS
[FreeTextEntry1] : Young presents for evaluation of cardiomyopathy. He has had a complicated medical history and is now s/p bone marrow transplant. He has been treated for AML including consolidation therapy in July of last year. He developed osteomyelitis of the right toe for which he underwent amputation. He has a known cardiomyopathy but had no cardiac complications throughout his hospitalization. He did have neutropenic fevers but no heart failure, dysrhythmias, or other cardiac problems. He is feeling well today and has no specific complaints.\par \par Since 7/19 LV systolic function has been deteriorating. LVEF from 40% to 20%. MR scan of the heart 9/19 demonstrated ejection fraction of 24%. Recently the patient has developed increasing water retention with weight gain, increasing shortness of breath. His Lasix was doubled and he has lost about 6 pounds of water weight. He is feeling better. By mistake  he took 160 mg of Lasix. He is also taking both carvedilol and metoprolol. Blood pressure will be a limiting factor for medical therapy.\par \par Past medical is remarkable for factor V Leiden mutation known in the family, history of DVT, pulmonary embolism, hypertension, tachycardia\par \par Social history is remarkable for smoking for approximately 45 years, stopped last year.  He has worked as a . The patient had been drinking alcohol which he stopped and unfortunately is having a high salt diet\par \par Family history is notable for both parents dying together in an accident. His father had successful bypass surgery at an older age\par \par I discussed his case with both electrophysiologist and the oncologist. Because of his present blood counts he is not considered an ideal candidate for any device or intervention. We are treating him medically.\par \par On the higher dose of carvedilol without the metoprolol he is more awake and less fatigued. He is having no shortness of breath. According to his girlfriend still having one alcohol drink a day and not behaving himself with low salt. Last visit I started valsartan 40 mg once a day. Is tolerating medicine well without side effects her blood pressures running in the low 90s but is not lightheaded. Breathing is good. He is cutting back on his alcohol.  He is trying to watch his salt diet\par \par Blood work 12/20 demonstrated creatinine 1.36, BUN. 17, potassium 3.8. He still is limited by shortness of breath. He has trace to 1+ edema. He has had about 3 glasses of wine over the past month.

## 2020-12-23 PROBLEM — Z86.69 HISTORY OF CONJUNCTIVITIS: Status: RESOLVED | Noted: 2020-01-10 | Resolved: 2020-01-01

## 2021-01-01 ENCOUNTER — APPOINTMENT (OUTPATIENT)
Dept: HEMATOLOGY ONCOLOGY | Facility: CLINIC | Age: 61
End: 2021-01-01
Payer: COMMERCIAL

## 2021-01-01 ENCOUNTER — APPOINTMENT (OUTPATIENT)
Dept: DISASTER EMERGENCY | Facility: CLINIC | Age: 61
End: 2021-01-01

## 2021-01-01 ENCOUNTER — APPOINTMENT (OUTPATIENT)
Dept: INFUSION THERAPY | Facility: HOSPITAL | Age: 61
End: 2021-01-01

## 2021-01-01 ENCOUNTER — RESULT REVIEW (OUTPATIENT)
Age: 61
End: 2021-01-01

## 2021-01-01 ENCOUNTER — NON-APPOINTMENT (OUTPATIENT)
Age: 61
End: 2021-01-01

## 2021-01-01 ENCOUNTER — APPOINTMENT (OUTPATIENT)
Dept: CARDIOLOGY | Facility: CLINIC | Age: 61
End: 2021-01-01
Payer: COMMERCIAL

## 2021-01-01 ENCOUNTER — RX RENEWAL (OUTPATIENT)
Age: 61
End: 2021-01-01

## 2021-01-01 ENCOUNTER — OUTPATIENT (OUTPATIENT)
Dept: OUTPATIENT SERVICES | Facility: HOSPITAL | Age: 61
LOS: 1 days | End: 2021-01-01
Payer: COMMERCIAL

## 2021-01-01 ENCOUNTER — OUTPATIENT (OUTPATIENT)
Dept: OUTPATIENT SERVICES | Facility: HOSPITAL | Age: 61
LOS: 1 days | Discharge: HOME | End: 2021-01-01

## 2021-01-01 ENCOUNTER — APPOINTMENT (OUTPATIENT)
Dept: HEMATOLOGY ONCOLOGY | Facility: CLINIC | Age: 61
End: 2021-01-01

## 2021-01-01 ENCOUNTER — TRANSCRIPTION ENCOUNTER (OUTPATIENT)
Age: 61
End: 2021-01-01

## 2021-01-01 ENCOUNTER — APPOINTMENT (OUTPATIENT)
Dept: HEART FAILURE | Facility: CLINIC | Age: 61
End: 2021-01-01
Payer: COMMERCIAL

## 2021-01-01 ENCOUNTER — LABORATORY RESULT (OUTPATIENT)
Age: 61
End: 2021-01-01

## 2021-01-01 ENCOUNTER — APPOINTMENT (OUTPATIENT)
Dept: DERMATOLOGY | Facility: CLINIC | Age: 61
End: 2021-01-01

## 2021-01-01 ENCOUNTER — APPOINTMENT (OUTPATIENT)
Dept: DERMATOLOGY | Facility: CLINIC | Age: 61
End: 2021-01-01
Payer: COMMERCIAL

## 2021-01-01 ENCOUNTER — INPATIENT (INPATIENT)
Facility: HOSPITAL | Age: 61
LOS: 4 days | DRG: 286 | End: 2021-10-11
Attending: INTERNAL MEDICINE | Admitting: INTERNAL MEDICINE
Payer: MEDICARE

## 2021-01-01 ENCOUNTER — INPATIENT (INPATIENT)
Facility: HOSPITAL | Age: 61
LOS: 3 days | Discharge: HOME | DRG: 314 | End: 2021-08-20
Attending: FAMILY MEDICINE | Admitting: FAMILY MEDICINE
Payer: COMMERCIAL

## 2021-01-01 ENCOUNTER — APPOINTMENT (OUTPATIENT)
Dept: RADIOLOGY | Facility: CLINIC | Age: 61
End: 2021-01-01
Payer: COMMERCIAL

## 2021-01-01 VITALS — RESPIRATION RATE: 14 BRPM | HEART RATE: 42 BPM

## 2021-01-01 VITALS
RESPIRATION RATE: 17 BRPM | OXYGEN SATURATION: 94 % | DIASTOLIC BLOOD PRESSURE: 64 MMHG | HEART RATE: 90 BPM | SYSTOLIC BLOOD PRESSURE: 98 MMHG | BODY MASS INDEX: 25.5 KG/M2 | WEIGHT: 178.35 LBS | TEMPERATURE: 96.9 F

## 2021-01-01 VITALS
BODY MASS INDEX: 27.52 KG/M2 | DIASTOLIC BLOOD PRESSURE: 63 MMHG | RESPIRATION RATE: 18 BRPM | HEART RATE: 98 BPM | HEIGHT: 70 IN | TEMPERATURE: 97.2 F | SYSTOLIC BLOOD PRESSURE: 94 MMHG | OXYGEN SATURATION: 95 % | WEIGHT: 192.24 LBS

## 2021-01-01 VITALS
RESPIRATION RATE: 18 BRPM | OXYGEN SATURATION: 97 % | HEART RATE: 82 BPM | SYSTOLIC BLOOD PRESSURE: 104 MMHG | TEMPERATURE: 98 F | DIASTOLIC BLOOD PRESSURE: 73 MMHG

## 2021-01-01 VITALS
SYSTOLIC BLOOD PRESSURE: 92 MMHG | TEMPERATURE: 98 F | WEIGHT: 175.05 LBS | OXYGEN SATURATION: 94 % | RESPIRATION RATE: 22 BRPM | HEIGHT: 73 IN | DIASTOLIC BLOOD PRESSURE: 60 MMHG | HEART RATE: 110 BPM

## 2021-01-01 VITALS
DIASTOLIC BLOOD PRESSURE: 66 MMHG | RESPIRATION RATE: 20 BRPM | OXYGEN SATURATION: 98 % | WEIGHT: 187.61 LBS | OXYGEN SATURATION: 97 % | HEART RATE: 96 BPM | DIASTOLIC BLOOD PRESSURE: 68 MMHG | HEIGHT: 70.08 IN | TEMPERATURE: 97.9 F | BODY MASS INDEX: 26.86 KG/M2 | WEIGHT: 195.77 LBS | RESPIRATION RATE: 17 BRPM | SYSTOLIC BLOOD PRESSURE: 104 MMHG | HEART RATE: 91 BPM | BODY MASS INDEX: 28.03 KG/M2 | TEMPERATURE: 97 F | SYSTOLIC BLOOD PRESSURE: 102 MMHG | HEIGHT: 70.12 IN

## 2021-01-01 VITALS
SYSTOLIC BLOOD PRESSURE: 91 MMHG | TEMPERATURE: 97.3 F | HEART RATE: 88 BPM | DIASTOLIC BLOOD PRESSURE: 60 MMHG | OXYGEN SATURATION: 91 % | RESPIRATION RATE: 17 BRPM

## 2021-01-01 VITALS
WEIGHT: 174 LBS | SYSTOLIC BLOOD PRESSURE: 94 MMHG | OXYGEN SATURATION: 95 % | DIASTOLIC BLOOD PRESSURE: 78 MMHG | HEART RATE: 100 BPM | HEIGHT: 70 IN | BODY MASS INDEX: 24.91 KG/M2

## 2021-01-01 VITALS
SYSTOLIC BLOOD PRESSURE: 104 MMHG | RESPIRATION RATE: 18 BRPM | OXYGEN SATURATION: 95 % | HEART RATE: 97 BPM | DIASTOLIC BLOOD PRESSURE: 64 MMHG | TEMPERATURE: 98 F

## 2021-01-01 VITALS
WEIGHT: 159 LBS | HEART RATE: 112 BPM | BODY MASS INDEX: 22.76 KG/M2 | SYSTOLIC BLOOD PRESSURE: 83 MMHG | HEIGHT: 70 IN | OXYGEN SATURATION: 97 % | DIASTOLIC BLOOD PRESSURE: 51 MMHG

## 2021-01-01 VITALS
SYSTOLIC BLOOD PRESSURE: 96 MMHG | DIASTOLIC BLOOD PRESSURE: 67 MMHG | HEART RATE: 95 BPM | HEIGHT: 71.02 IN | OXYGEN SATURATION: 92 % | WEIGHT: 184 LBS | BODY MASS INDEX: 25.76 KG/M2

## 2021-01-01 VITALS
TEMPERATURE: 97.2 F | HEIGHT: 70.12 IN | OXYGEN SATURATION: 93 % | HEART RATE: 97 BPM | WEIGHT: 187.39 LBS | RESPIRATION RATE: 19 BRPM | DIASTOLIC BLOOD PRESSURE: 71 MMHG | BODY MASS INDEX: 26.83 KG/M2 | SYSTOLIC BLOOD PRESSURE: 103 MMHG

## 2021-01-01 VITALS
WEIGHT: 188.47 LBS | DIASTOLIC BLOOD PRESSURE: 61 MMHG | TEMPERATURE: 97.4 F | SYSTOLIC BLOOD PRESSURE: 91 MMHG | HEART RATE: 98 BPM | RESPIRATION RATE: 17 BRPM | OXYGEN SATURATION: 96 % | BODY MASS INDEX: 26.27 KG/M2

## 2021-01-01 VITALS
TEMPERATURE: 97 F | RESPIRATION RATE: 18 BRPM | SYSTOLIC BLOOD PRESSURE: 91 MMHG | HEIGHT: 70 IN | BODY MASS INDEX: 24.62 KG/M2 | WEIGHT: 171.96 LBS | DIASTOLIC BLOOD PRESSURE: 59 MMHG

## 2021-01-01 VITALS
SYSTOLIC BLOOD PRESSURE: 92 MMHG | HEART RATE: 108 BPM | WEIGHT: 166 LBS | OXYGEN SATURATION: 96 % | HEIGHT: 70 IN | DIASTOLIC BLOOD PRESSURE: 58 MMHG | BODY MASS INDEX: 23.77 KG/M2

## 2021-01-01 VITALS
HEART RATE: 89 BPM | HEIGHT: 70.12 IN | SYSTOLIC BLOOD PRESSURE: 96 MMHG | OXYGEN SATURATION: 95 % | DIASTOLIC BLOOD PRESSURE: 66 MMHG | WEIGHT: 181 LBS | BODY MASS INDEX: 25.91 KG/M2

## 2021-01-01 VITALS
OXYGEN SATURATION: 95 % | BODY MASS INDEX: 28.49 KG/M2 | HEART RATE: 96 BPM | HEIGHT: 70 IN | SYSTOLIC BLOOD PRESSURE: 95 MMHG | WEIGHT: 199 LBS | DIASTOLIC BLOOD PRESSURE: 64 MMHG

## 2021-01-01 VITALS — WEIGHT: 186 LBS | BODY MASS INDEX: 26.69 KG/M2

## 2021-01-01 VITALS
BODY MASS INDEX: 26.45 KG/M2 | OXYGEN SATURATION: 98 % | TEMPERATURE: 98.1 F | RESPIRATION RATE: 17 BRPM | HEART RATE: 97 BPM | DIASTOLIC BLOOD PRESSURE: 63 MMHG | SYSTOLIC BLOOD PRESSURE: 99 MMHG | HEIGHT: 71.02 IN | WEIGHT: 188.91 LBS

## 2021-01-01 VITALS — OXYGEN SATURATION: 99 %

## 2021-01-01 VITALS
HEIGHT: 71 IN | BODY MASS INDEX: 26.04 KG/M2 | DIASTOLIC BLOOD PRESSURE: 66 MMHG | HEART RATE: 108 BPM | WEIGHT: 186 LBS | SYSTOLIC BLOOD PRESSURE: 99 MMHG

## 2021-01-01 DIAGNOSIS — C92.00 ACUTE MYELOBLASTIC LEUKEMIA, NOT HAVING ACHIEVED REMISSION: ICD-10-CM

## 2021-01-01 DIAGNOSIS — Z94.84 STEM CELLS TRANSPLANT STATUS: ICD-10-CM

## 2021-01-01 DIAGNOSIS — I50.9 HEART FAILURE, UNSPECIFIED: ICD-10-CM

## 2021-01-01 DIAGNOSIS — R11.2 NAUSEA WITH VOMITING, UNSPECIFIED: ICD-10-CM

## 2021-01-01 DIAGNOSIS — Z01.818 ENCOUNTER FOR OTHER PREPROCEDURAL EXAMINATION: ICD-10-CM

## 2021-01-01 DIAGNOSIS — D68.51 ACTIVATED PROTEIN C RESISTANCE: ICD-10-CM

## 2021-01-01 DIAGNOSIS — I95.9 HYPOTENSION, UNSPECIFIED: ICD-10-CM

## 2021-01-01 DIAGNOSIS — L98.8 GRAFT-VERSUS-HOST DISEASE, UNSPECIFIED: ICD-10-CM

## 2021-01-01 DIAGNOSIS — R29.6 REPEATED FALLS: ICD-10-CM

## 2021-01-01 DIAGNOSIS — R57.0 CARDIOGENIC SHOCK: ICD-10-CM

## 2021-01-01 DIAGNOSIS — Z94.1 HEART TRANSPLANT STATUS: ICD-10-CM

## 2021-01-01 DIAGNOSIS — J90 PLEURAL EFFUSION, NOT ELSEWHERE CLASSIFIED: ICD-10-CM

## 2021-01-01 DIAGNOSIS — I87.2 VENOUS INSUFFICIENCY (CHRONIC) (PERIPHERAL): ICD-10-CM

## 2021-01-01 DIAGNOSIS — Z86.19 PERSONAL HISTORY OF OTHER INFECTIOUS AND PARASITIC DISEASES: ICD-10-CM

## 2021-01-01 DIAGNOSIS — Z94.81 BONE MARROW TRANSPLANT STATUS: Chronic | ICD-10-CM

## 2021-01-01 DIAGNOSIS — I50.22 CHRONIC SYSTOLIC (CONGESTIVE) HEART FAILURE: ICD-10-CM

## 2021-01-01 DIAGNOSIS — Z71.89 OTHER SPECIFIED COUNSELING: ICD-10-CM

## 2021-01-01 DIAGNOSIS — D64.9 ANEMIA, UNSPECIFIED: ICD-10-CM

## 2021-01-01 DIAGNOSIS — Z86.718 PERSONAL HISTORY OF OTHER VENOUS THROMBOSIS AND EMBOLISM: ICD-10-CM

## 2021-01-01 DIAGNOSIS — Z51.11 ENCOUNTER FOR ANTINEOPLASTIC CHEMOTHERAPY: ICD-10-CM

## 2021-01-01 DIAGNOSIS — R22.31 LOCALIZED SWELLING, MASS AND LUMP, RIGHT UPPER LIMB: ICD-10-CM

## 2021-01-01 DIAGNOSIS — B25.9 CYTOMEGALOVIRAL DISEASE, UNSPECIFIED: ICD-10-CM

## 2021-01-01 DIAGNOSIS — Z29.9 ENCOUNTER FOR PROPHYLACTIC MEASURES, UNSPECIFIED: ICD-10-CM

## 2021-01-01 DIAGNOSIS — I50.23 ACUTE ON CHRONIC SYSTOLIC (CONGESTIVE) HEART FAILURE: ICD-10-CM

## 2021-01-01 DIAGNOSIS — D89.813 GRAFT-VERSUS-HOST DISEASE, UNSPECIFIED: ICD-10-CM

## 2021-01-01 DIAGNOSIS — G47.33 OBSTRUCTIVE SLEEP APNEA (ADULT) (PEDIATRIC): ICD-10-CM

## 2021-01-01 DIAGNOSIS — D69.6 THROMBOCYTOPENIA, UNSPECIFIED: ICD-10-CM

## 2021-01-01 DIAGNOSIS — R74.01 ELEVATION OF LEVELS OF LIVER TRANSAMINASE LEVELS: ICD-10-CM

## 2021-01-01 DIAGNOSIS — Z51.5 ENCOUNTER FOR PALLIATIVE CARE: ICD-10-CM

## 2021-01-01 DIAGNOSIS — Z91.81 HISTORY OF FALLING: ICD-10-CM

## 2021-01-01 DIAGNOSIS — Z86.711 PERSONAL HISTORY OF PULMONARY EMBOLISM: ICD-10-CM

## 2021-01-01 DIAGNOSIS — R21 RASH AND OTHER NONSPECIFIC SKIN ERUPTION: ICD-10-CM

## 2021-01-01 DIAGNOSIS — Z51.89 ENCOUNTER FOR OTHER SPECIFIED AFTERCARE: ICD-10-CM

## 2021-01-01 DIAGNOSIS — Z78.9 OTHER SPECIFIED HEALTH STATUS: ICD-10-CM

## 2021-01-01 DIAGNOSIS — Z94.81 BONE MARROW TRANSPLANT STATUS: ICD-10-CM

## 2021-01-01 DIAGNOSIS — R00.0 TACHYCARDIA, UNSPECIFIED: ICD-10-CM

## 2021-01-01 DIAGNOSIS — R60.0 LOCALIZED EDEMA: ICD-10-CM

## 2021-01-01 DIAGNOSIS — Z79.899 OTHER LONG TERM (CURRENT) DRUG THERAPY: ICD-10-CM

## 2021-01-01 DIAGNOSIS — Z86.79 PERSONAL HISTORY OF OTHER DISEASES OF THE CIRCULATORY SYSTEM: ICD-10-CM

## 2021-01-01 DIAGNOSIS — I42.9 CARDIOMYOPATHY, UNSPECIFIED: ICD-10-CM

## 2021-01-01 DIAGNOSIS — Z85.6 PERSONAL HISTORY OF LEUKEMIA: ICD-10-CM

## 2021-01-01 DIAGNOSIS — R06.00 DYSPNEA, UNSPECIFIED: ICD-10-CM

## 2021-01-01 DIAGNOSIS — Z72.89 OTHER PROBLEMS RELATED TO LIFESTYLE: ICD-10-CM

## 2021-01-01 DIAGNOSIS — I42.8 OTHER CARDIOMYOPATHIES: ICD-10-CM

## 2021-01-01 DIAGNOSIS — R53.81 OTHER MALAISE: ICD-10-CM

## 2021-01-01 LAB
-  AMPICILLIN/SULBACTAM: SIGNIFICANT CHANGE UP
-  CEFAZOLIN: SIGNIFICANT CHANGE UP
-  CLINDAMYCIN: SIGNIFICANT CHANGE UP
-  ERYTHROMYCIN: SIGNIFICANT CHANGE UP
-  GENTAMICIN: SIGNIFICANT CHANGE UP
-  OXACILLIN: SIGNIFICANT CHANGE UP
-  RIFAMPIN: SIGNIFICANT CHANGE UP
-  TETRACYCLINE: SIGNIFICANT CHANGE UP
-  TRIMETHOPRIM/SULFAMETHOXAZOLE: SIGNIFICANT CHANGE UP
-  VANCOMYCIN: SIGNIFICANT CHANGE UP
ABO RH CONFIRMATION: SIGNIFICANT CHANGE UP
ALBUMIN SERPL ELPH-MCNC: 2.7 G/DL — LOW (ref 3.3–5.2)
ALBUMIN SERPL ELPH-MCNC: 2.9 G/DL — LOW (ref 3.3–5.2)
ALBUMIN SERPL ELPH-MCNC: 2.9 G/DL — LOW (ref 3.3–5.2)
ALBUMIN SERPL ELPH-MCNC: 3 G/DL — LOW (ref 3.3–5.2)
ALBUMIN SERPL ELPH-MCNC: 3.1 G/DL — LOW (ref 3.3–5)
ALBUMIN SERPL ELPH-MCNC: 3.3 G/DL
ALBUMIN SERPL ELPH-MCNC: 3.3 G/DL — SIGNIFICANT CHANGE UP (ref 3.3–5)
ALBUMIN SERPL ELPH-MCNC: 3.4 G/DL — SIGNIFICANT CHANGE UP (ref 3.3–5)
ALBUMIN SERPL ELPH-MCNC: 3.6 G/DL
ALBUMIN SERPL ELPH-MCNC: 3.7 G/DL
ALBUMIN SERPL ELPH-MCNC: 3.7 G/DL — SIGNIFICANT CHANGE UP (ref 3.3–5)
ALBUMIN SERPL ELPH-MCNC: 3.8 G/DL
ALBUMIN SERPL ELPH-MCNC: 3.8 G/DL
ALBUMIN SERPL ELPH-MCNC: 3.9 G/DL
ALBUMIN SERPL ELPH-MCNC: 4 G/DL
ALBUMIN SERPL ELPH-MCNC: 4 G/DL
ALBUMIN SERPL ELPH-MCNC: 4.1 G/DL
ALBUMIN SERPL ELPH-MCNC: 4.2 G/DL
ALBUMIN SERPL ELPH-MCNC: 4.4 G/DL
ALP BLD-CCNC: 137 U/L
ALP BLD-CCNC: 162 U/L
ALP BLD-CCNC: 162 U/L
ALP BLD-CCNC: 163 U/L
ALP BLD-CCNC: 168 U/L
ALP BLD-CCNC: 212 U/L
ALP BLD-CCNC: 215 U/L
ALP BLD-CCNC: 220 U/L
ALP BLD-CCNC: 245 U/L
ALP BLD-CCNC: 255 U/L
ALP BLD-CCNC: 273 U/L
ALP SERPL-CCNC: 208 U/L — HIGH (ref 40–120)
ALP SERPL-CCNC: 220 U/L — HIGH (ref 40–120)
ALP SERPL-CCNC: 229 U/L — HIGH (ref 40–120)
ALP SERPL-CCNC: 259 U/L — HIGH (ref 40–120)
ALP SERPL-CCNC: 357 U/L — HIGH (ref 40–120)
ALP SERPL-CCNC: 371 U/L — HIGH (ref 40–120)
ALP SERPL-CCNC: 376 U/L — HIGH (ref 40–120)
ALP SERPL-CCNC: 387 U/L — HIGH (ref 40–120)
ALT FLD-CCNC: 43 U/L — SIGNIFICANT CHANGE UP (ref 10–45)
ALT FLD-CCNC: 47 U/L — HIGH (ref 10–45)
ALT FLD-CCNC: 48 U/L — HIGH (ref 10–45)
ALT FLD-CCNC: 57 U/L — HIGH
ALT FLD-CCNC: 60 U/L — HIGH (ref 10–45)
ALT FLD-CCNC: 65 U/L — HIGH
ALT FLD-CCNC: 71 U/L — HIGH
ALT FLD-CCNC: 77 U/L — HIGH
ALT SERPL-CCNC: 14 U/L
ALT SERPL-CCNC: 20 U/L
ALT SERPL-CCNC: 23 U/L
ALT SERPL-CCNC: 26 U/L
ALT SERPL-CCNC: 35 U/L
ALT SERPL-CCNC: 36 U/L
ALT SERPL-CCNC: 40 U/L
ALT SERPL-CCNC: 7 U/L
ALT SERPL-CCNC: 8 U/L
ALT SERPL-CCNC: 8 U/L
ALT SERPL-CCNC: 9 U/L
ANION GAP SERPL CALC-SCNC: 10 MMOL/L
ANION GAP SERPL CALC-SCNC: 11 MMOL/L
ANION GAP SERPL CALC-SCNC: 11 MMOL/L — SIGNIFICANT CHANGE UP (ref 5–17)
ANION GAP SERPL CALC-SCNC: 12 MMOL/L
ANION GAP SERPL CALC-SCNC: 12 MMOL/L
ANION GAP SERPL CALC-SCNC: 13 MMOL/L
ANION GAP SERPL CALC-SCNC: 13 MMOL/L — SIGNIFICANT CHANGE UP (ref 5–17)
ANION GAP SERPL CALC-SCNC: 13 MMOL/L — SIGNIFICANT CHANGE UP (ref 5–17)
ANION GAP SERPL CALC-SCNC: 14 MMOL/L
ANION GAP SERPL CALC-SCNC: 14 MMOL/L — SIGNIFICANT CHANGE UP (ref 5–17)
ANION GAP SERPL CALC-SCNC: 15 MMOL/L
ANION GAP SERPL CALC-SCNC: 15 MMOL/L — SIGNIFICANT CHANGE UP (ref 5–17)
ANION GAP SERPL CALC-SCNC: 16 MMOL/L
ANION GAP SERPL CALC-SCNC: 16 MMOL/L
ANION GAP SERPL CALC-SCNC: 16 MMOL/L — SIGNIFICANT CHANGE UP (ref 5–17)
ANION GAP SERPL CALC-SCNC: 17 MMOL/L — SIGNIFICANT CHANGE UP (ref 5–17)
ANION GAP SERPL CALC-SCNC: 21 MMOL/L — HIGH (ref 5–17)
ANION GAP SERPL CALC-SCNC: 8 MMOL/L — SIGNIFICANT CHANGE UP (ref 5–17)
ANION GAP SERPL CALC-SCNC: 8 MMOL/L — SIGNIFICANT CHANGE UP (ref 5–17)
ANISOCYTOSIS BLD QL: SIGNIFICANT CHANGE UP
ANISOCYTOSIS BLD QL: SIGNIFICANT CHANGE UP
ANISOCYTOSIS BLD QL: SLIGHT — SIGNIFICANT CHANGE UP
APPEARANCE UR: CLEAR — SIGNIFICANT CHANGE UP
APPEARANCE UR: CLEAR — SIGNIFICANT CHANGE UP
APTT BLD: 30.3 SEC — SIGNIFICANT CHANGE UP (ref 27.5–35.5)
AST SERPL-CCNC: 118 U/L — HIGH
AST SERPL-CCNC: 12 U/L
AST SERPL-CCNC: 133 U/L — HIGH
AST SERPL-CCNC: 14 U/L
AST SERPL-CCNC: 145 U/L — HIGH
AST SERPL-CCNC: 15 U/L
AST SERPL-CCNC: 15 U/L
AST SERPL-CCNC: 21 U/L
AST SERPL-CCNC: 31 U/L
AST SERPL-CCNC: 33 U/L — SIGNIFICANT CHANGE UP (ref 10–40)
AST SERPL-CCNC: 35 U/L
AST SERPL-CCNC: 36 U/L
AST SERPL-CCNC: 37 U/L — SIGNIFICANT CHANGE UP (ref 10–40)
AST SERPL-CCNC: 40 U/L — SIGNIFICANT CHANGE UP (ref 10–40)
AST SERPL-CCNC: 51 U/L — HIGH (ref 10–40)
AST SERPL-CCNC: 58 U/L
AST SERPL-CCNC: 58 U/L
AST SERPL-CCNC: 63 U/L
AST SERPL-CCNC: 95 U/L — HIGH
BASE EXCESS BLDMV CALC-SCNC: -0.4 MMOL/L — SIGNIFICANT CHANGE UP (ref -3–3)
BASE EXCESS BLDMV CALC-SCNC: -9.3 MMOL/L — LOW (ref -3–3)
BASE EXCESS BLDMV CALC-SCNC: 1 MMOL/L — SIGNIFICANT CHANGE UP (ref -3–3)
BASE EXCESS BLDMV CALC-SCNC: 1.2 MMOL/L — SIGNIFICANT CHANGE UP (ref -3–3)
BASE EXCESS BLDMV CALC-SCNC: 1.2 MMOL/L — SIGNIFICANT CHANGE UP (ref -3–3)
BASE EXCESS BLDMV CALC-SCNC: 1.5 MMOL/L — SIGNIFICANT CHANGE UP (ref -3–3)
BASOPHILS # BLD AUTO: 0 K/UL — SIGNIFICANT CHANGE UP (ref 0–0.2)
BASOPHILS # BLD AUTO: 0.01 K/UL — SIGNIFICANT CHANGE UP (ref 0–0.2)
BASOPHILS # BLD AUTO: 0.02 K/UL — SIGNIFICANT CHANGE UP (ref 0–0.2)
BASOPHILS # BLD AUTO: 0.02 K/UL — SIGNIFICANT CHANGE UP (ref 0–0.2)
BASOPHILS # BLD AUTO: 0.04 K/UL — SIGNIFICANT CHANGE UP (ref 0–0.2)
BASOPHILS # BLD AUTO: 0.05 K/UL — SIGNIFICANT CHANGE UP (ref 0–0.2)
BASOPHILS # BLD AUTO: 0.06 K/UL — SIGNIFICANT CHANGE UP (ref 0–0.2)
BASOPHILS NFR BLD AUTO: 0 % — SIGNIFICANT CHANGE UP (ref 0–2)
BASOPHILS NFR BLD AUTO: 0.2 % — SIGNIFICANT CHANGE UP (ref 0–2)
BASOPHILS NFR BLD AUTO: 0.4 % — SIGNIFICANT CHANGE UP (ref 0–2)
BASOPHILS NFR BLD AUTO: 0.4 % — SIGNIFICANT CHANGE UP (ref 0–2)
BASOPHILS NFR BLD AUTO: 0.5 % — SIGNIFICANT CHANGE UP (ref 0–2)
BASOPHILS NFR BLD AUTO: 0.9 % — SIGNIFICANT CHANGE UP (ref 0–2)
BASOPHILS NFR BLD AUTO: 1 % — SIGNIFICANT CHANGE UP (ref 0–2)
BILIRUB SERPL-MCNC: 0.3 MG/DL
BILIRUB SERPL-MCNC: 0.4 MG/DL
BILIRUB SERPL-MCNC: 0.5 MG/DL
BILIRUB SERPL-MCNC: 0.8 MG/DL
BILIRUB SERPL-MCNC: 0.8 MG/DL
BILIRUB SERPL-MCNC: 0.9 MG/DL
BILIRUB SERPL-MCNC: 0.9 MG/DL — SIGNIFICANT CHANGE UP (ref 0.4–2)
BILIRUB SERPL-MCNC: 0.9 MG/DL — SIGNIFICANT CHANGE UP (ref 0.4–2)
BILIRUB SERPL-MCNC: 1.1 MG/DL — SIGNIFICANT CHANGE UP (ref 0.4–2)
BILIRUB SERPL-MCNC: 1.2 MG/DL — SIGNIFICANT CHANGE UP (ref 0.2–1.2)
BILIRUB SERPL-MCNC: 1.2 MG/DL — SIGNIFICANT CHANGE UP (ref 0.4–2)
BILIRUB SERPL-MCNC: 1.3 MG/DL — HIGH (ref 0.2–1.2)
BILIRUB SERPL-MCNC: 1.4 MG/DL — HIGH (ref 0.2–1.2)
BILIRUB SERPL-MCNC: 1.6 MG/DL — HIGH (ref 0.2–1.2)
BILIRUB UR-MCNC: NEGATIVE — SIGNIFICANT CHANGE UP
BILIRUB UR-MCNC: NEGATIVE — SIGNIFICANT CHANGE UP
BLD GP AB SCN SERPL QL: NEGATIVE — SIGNIFICANT CHANGE UP
BLD GP AB SCN SERPL QL: NEGATIVE — SIGNIFICANT CHANGE UP
BLD GP AB SCN SERPL QL: SIGNIFICANT CHANGE UP
BUN SERPL-MCNC: 10 MG/DL — SIGNIFICANT CHANGE UP (ref 8–20)
BUN SERPL-MCNC: 12 MG/DL
BUN SERPL-MCNC: 13 MG/DL
BUN SERPL-MCNC: 14 MG/DL
BUN SERPL-MCNC: 14 MG/DL
BUN SERPL-MCNC: 15 MG/DL
BUN SERPL-MCNC: 16 MG/DL
BUN SERPL-MCNC: 17 MG/DL
BUN SERPL-MCNC: 18 MG/DL
BUN SERPL-MCNC: 19 MG/DL
BUN SERPL-MCNC: 21 MG/DL
BUN SERPL-MCNC: 21 MG/DL — SIGNIFICANT CHANGE UP (ref 7–23)
BUN SERPL-MCNC: 22 MG/DL — SIGNIFICANT CHANGE UP (ref 7–23)
BUN SERPL-MCNC: 22 MG/DL — SIGNIFICANT CHANGE UP (ref 7–23)
BUN SERPL-MCNC: 23 MG/DL — SIGNIFICANT CHANGE UP (ref 7–23)
BUN SERPL-MCNC: 24 MG/DL — HIGH (ref 7–23)
BUN SERPL-MCNC: 29 MG/DL
BUN SERPL-MCNC: 34 MG/DL — HIGH (ref 7–23)
BUN SERPL-MCNC: 7.9 MG/DL — LOW (ref 8–20)
BUN SERPL-MCNC: 9.1 MG/DL — SIGNIFICANT CHANGE UP (ref 8–20)
BUN SERPL-MCNC: 9.3 MG/DL — SIGNIFICANT CHANGE UP (ref 8–20)
CALCIUM SERPL-MCNC: 7.9 MG/DL — LOW (ref 8.6–10.2)
CALCIUM SERPL-MCNC: 8.3 MG/DL
CALCIUM SERPL-MCNC: 8.4 MG/DL — SIGNIFICANT CHANGE UP (ref 8.4–10.5)
CALCIUM SERPL-MCNC: 8.5 MG/DL — LOW (ref 8.6–10.2)
CALCIUM SERPL-MCNC: 8.5 MG/DL — LOW (ref 8.6–10.2)
CALCIUM SERPL-MCNC: 8.5 MG/DL — SIGNIFICANT CHANGE UP (ref 8.4–10.5)
CALCIUM SERPL-MCNC: 8.5 MG/DL — SIGNIFICANT CHANGE UP (ref 8.4–10.5)
CALCIUM SERPL-MCNC: 8.6 MG/DL
CALCIUM SERPL-MCNC: 8.6 MG/DL
CALCIUM SERPL-MCNC: 8.6 MG/DL — SIGNIFICANT CHANGE UP (ref 8.4–10.5)
CALCIUM SERPL-MCNC: 8.6 MG/DL — SIGNIFICANT CHANGE UP (ref 8.4–10.5)
CALCIUM SERPL-MCNC: 8.7 MG/DL
CALCIUM SERPL-MCNC: 8.7 MG/DL — SIGNIFICANT CHANGE UP (ref 8.6–10.2)
CALCIUM SERPL-MCNC: 8.9 MG/DL
CALCIUM SERPL-MCNC: 8.9 MG/DL — SIGNIFICANT CHANGE UP (ref 8.4–10.5)
CALCIUM SERPL-MCNC: 9 MG/DL
CALCIUM SERPL-MCNC: 9 MG/DL
CALCIUM SERPL-MCNC: 9.1 MG/DL
CD3 AND CD33 ENRICHMENT: NORMAL
CHLORIDE SERPL-SCNC: 100 MMOL/L
CHLORIDE SERPL-SCNC: 100 MMOL/L — SIGNIFICANT CHANGE UP (ref 98–107)
CHLORIDE SERPL-SCNC: 101 MMOL/L
CHLORIDE SERPL-SCNC: 101 MMOL/L — SIGNIFICANT CHANGE UP (ref 96–108)
CHLORIDE SERPL-SCNC: 101 MMOL/L — SIGNIFICANT CHANGE UP (ref 98–107)
CHLORIDE SERPL-SCNC: 102 MMOL/L
CHLORIDE SERPL-SCNC: 102 MMOL/L — SIGNIFICANT CHANGE UP (ref 96–108)
CHLORIDE SERPL-SCNC: 103 MMOL/L
CHLORIDE SERPL-SCNC: 103 MMOL/L — SIGNIFICANT CHANGE UP (ref 98–107)
CHLORIDE SERPL-SCNC: 104 MMOL/L — SIGNIFICANT CHANGE UP (ref 96–108)
CHLORIDE SERPL-SCNC: 104 MMOL/L — SIGNIFICANT CHANGE UP (ref 98–107)
CHLORIDE SERPL-SCNC: 105 MMOL/L
CHLORIDE SERPL-SCNC: 106 MMOL/L
CHLORIDE SERPL-SCNC: 106 MMOL/L
CHLORIDE SERPL-SCNC: 96 MMOL/L
CHLORIDE SERPL-SCNC: 97 MMOL/L
CMV DNA SPEC QL NAA+PROBE: NOT DETECTED IU/ML
CO2 BLDMV-SCNC: 18 MMOL/L — LOW (ref 21–29)
CO2 BLDMV-SCNC: 26 MMOL/L — SIGNIFICANT CHANGE UP (ref 21–29)
CO2 BLDMV-SCNC: 28 MMOL/L — SIGNIFICANT CHANGE UP (ref 21–29)
CO2 SERPL-SCNC: 16 MMOL/L — LOW (ref 22–31)
CO2 SERPL-SCNC: 20 MMOL/L
CO2 SERPL-SCNC: 20 MMOL/L — LOW (ref 22–31)
CO2 SERPL-SCNC: 20 MMOL/L — LOW (ref 22–31)
CO2 SERPL-SCNC: 22 MMOL/L
CO2 SERPL-SCNC: 22 MMOL/L
CO2 SERPL-SCNC: 22 MMOL/L — SIGNIFICANT CHANGE UP (ref 22–31)
CO2 SERPL-SCNC: 22 MMOL/L — SIGNIFICANT CHANGE UP (ref 22–31)
CO2 SERPL-SCNC: 23 MMOL/L
CO2 SERPL-SCNC: 23 MMOL/L
CO2 SERPL-SCNC: 23 MMOL/L — SIGNIFICANT CHANGE UP (ref 22–31)
CO2 SERPL-SCNC: 24 MMOL/L
CO2 SERPL-SCNC: 24 MMOL/L
CO2 SERPL-SCNC: 25 MMOL/L
CO2 SERPL-SCNC: 25 MMOL/L
CO2 SERPL-SCNC: 26 MMOL/L — SIGNIFICANT CHANGE UP (ref 22–29)
CO2 SERPL-SCNC: 26 MMOL/L — SIGNIFICANT CHANGE UP (ref 22–29)
CO2 SERPL-SCNC: 27 MMOL/L
CO2 SERPL-SCNC: 27 MMOL/L — SIGNIFICANT CHANGE UP (ref 22–29)
CO2 SERPL-SCNC: 28 MMOL/L — SIGNIFICANT CHANGE UP (ref 22–29)
CO2 SERPL-SCNC: 29 MMOL/L
COLOR SPEC: YELLOW — SIGNIFICANT CHANGE UP
COLOR SPEC: YELLOW — SIGNIFICANT CHANGE UP
COVID-19 SPIKE DOMAIN AB INTERP: NEGATIVE — SIGNIFICANT CHANGE UP
COVID-19 SPIKE DOMAIN AB INTERP: POSITIVE
COVID-19 SPIKE DOMAIN ANTIBODY RESULT: 0.4 U/ML — SIGNIFICANT CHANGE UP
COVID-19 SPIKE DOMAIN ANTIBODY RESULT: 28.4 U/ML — HIGH
CREAT SERPL-MCNC: 0.87 MG/DL — SIGNIFICANT CHANGE UP (ref 0.5–1.3)
CREAT SERPL-MCNC: 0.88 MG/DL — SIGNIFICANT CHANGE UP (ref 0.5–1.3)
CREAT SERPL-MCNC: 0.9 MG/DL — SIGNIFICANT CHANGE UP (ref 0.5–1.3)
CREAT SERPL-MCNC: 0.91 MG/DL
CREAT SERPL-MCNC: 0.96 MG/DL — SIGNIFICANT CHANGE UP (ref 0.5–1.3)
CREAT SERPL-MCNC: 1.01 MG/DL
CREAT SERPL-MCNC: 1.02 MG/DL — SIGNIFICANT CHANGE UP (ref 0.5–1.3)
CREAT SERPL-MCNC: 1.09 MG/DL — SIGNIFICANT CHANGE UP (ref 0.5–1.3)
CREAT SERPL-MCNC: 1.09 MG/DL — SIGNIFICANT CHANGE UP (ref 0.5–1.3)
CREAT SERPL-MCNC: 1.1 MG/DL — SIGNIFICANT CHANGE UP (ref 0.5–1.3)
CREAT SERPL-MCNC: 1.11 MG/DL — SIGNIFICANT CHANGE UP (ref 0.5–1.3)
CREAT SERPL-MCNC: 1.13 MG/DL
CREAT SERPL-MCNC: 1.13 MG/DL
CREAT SERPL-MCNC: 1.13 MG/DL — SIGNIFICANT CHANGE UP (ref 0.5–1.3)
CREAT SERPL-MCNC: 1.14 MG/DL
CREAT SERPL-MCNC: 1.14 MG/DL
CREAT SERPL-MCNC: 1.27 MG/DL
CREAT SERPL-MCNC: 1.32 MG/DL
CREAT SERPL-MCNC: 1.33 MG/DL
CREAT SERPL-MCNC: 1.49 MG/DL
CREAT SERPL-MCNC: 1.7 MG/DL
CULTURE RESULTS: SIGNIFICANT CHANGE UP
D DIMER BLD IA.RAPID-MCNC: 1347 NG/ML DDU — HIGH
DACRYOCYTES BLD QL SMEAR: SLIGHT — SIGNIFICANT CHANGE UP
DIFF PNL FLD: NEGATIVE — SIGNIFICANT CHANGE UP
DIFF PNL FLD: NEGATIVE — SIGNIFICANT CHANGE UP
ELLIPTOCYTES BLD QL SMEAR: SLIGHT — SIGNIFICANT CHANGE UP
ENGRAFTMNET-POST: NORMAL
EOSINOPHIL # BLD AUTO: 0 K/UL — SIGNIFICANT CHANGE UP (ref 0–0.5)
EOSINOPHIL # BLD AUTO: 0 K/UL — SIGNIFICANT CHANGE UP (ref 0–0.5)
EOSINOPHIL # BLD AUTO: 0.08 K/UL — SIGNIFICANT CHANGE UP (ref 0–0.5)
EOSINOPHIL # BLD AUTO: 0.21 K/UL — SIGNIFICANT CHANGE UP (ref 0–0.5)
EOSINOPHIL # BLD AUTO: 0.23 K/UL — SIGNIFICANT CHANGE UP (ref 0–0.5)
EOSINOPHIL # BLD AUTO: 0.25 K/UL — SIGNIFICANT CHANGE UP (ref 0–0.5)
EOSINOPHIL # BLD AUTO: 0.27 K/UL — SIGNIFICANT CHANGE UP (ref 0–0.5)
EOSINOPHIL # BLD AUTO: 0.29 K/UL — SIGNIFICANT CHANGE UP (ref 0–0.5)
EOSINOPHIL # BLD AUTO: 0.3 K/UL — SIGNIFICANT CHANGE UP (ref 0–0.5)
EOSINOPHIL # BLD AUTO: 0.31 K/UL — SIGNIFICANT CHANGE UP (ref 0–0.5)
EOSINOPHIL # BLD AUTO: 0.35 K/UL — SIGNIFICANT CHANGE UP (ref 0–0.5)
EOSINOPHIL # BLD AUTO: 0.4 K/UL — SIGNIFICANT CHANGE UP (ref 0–0.5)
EOSINOPHIL # BLD AUTO: 0.42 K/UL — SIGNIFICANT CHANGE UP (ref 0–0.5)
EOSINOPHIL # BLD AUTO: 0.42 K/UL — SIGNIFICANT CHANGE UP (ref 0–0.5)
EOSINOPHIL # BLD AUTO: 0.44 K/UL — SIGNIFICANT CHANGE UP (ref 0–0.5)
EOSINOPHIL # BLD AUTO: 0.58 K/UL — HIGH (ref 0–0.5)
EOSINOPHIL # BLD AUTO: 0.66 K/UL — HIGH (ref 0–0.5)
EOSINOPHIL # BLD AUTO: 0.66 K/UL — HIGH (ref 0–0.5)
EOSINOPHIL # BLD AUTO: 0.83 K/UL — HIGH (ref 0–0.5)
EOSINOPHIL # BLD AUTO: 1.43 K/UL — HIGH (ref 0–0.5)
EOSINOPHIL # BLD AUTO: 1.45 K/UL — HIGH (ref 0–0.5)
EOSINOPHIL NFR BLD AUTO: 0 % — SIGNIFICANT CHANGE UP (ref 0–6)
EOSINOPHIL NFR BLD AUTO: 0 % — SIGNIFICANT CHANGE UP (ref 0–6)
EOSINOPHIL NFR BLD AUTO: 1 % — SIGNIFICANT CHANGE UP (ref 0–6)
EOSINOPHIL NFR BLD AUTO: 18.1 % — HIGH (ref 0–6)
EOSINOPHIL NFR BLD AUTO: 22 % — HIGH (ref 0–6)
EOSINOPHIL NFR BLD AUTO: 4 % — SIGNIFICANT CHANGE UP (ref 0–6)
EOSINOPHIL NFR BLD AUTO: 4 % — SIGNIFICANT CHANGE UP (ref 0–6)
EOSINOPHIL NFR BLD AUTO: 4.4 % — SIGNIFICANT CHANGE UP (ref 0–6)
EOSINOPHIL NFR BLD AUTO: 4.7 % — SIGNIFICANT CHANGE UP (ref 0–6)
EOSINOPHIL NFR BLD AUTO: 5 % — SIGNIFICANT CHANGE UP (ref 0–6)
EOSINOPHIL NFR BLD AUTO: 5.2 % — SIGNIFICANT CHANGE UP (ref 0–6)
EOSINOPHIL NFR BLD AUTO: 5.3 % — SIGNIFICANT CHANGE UP (ref 0–6)
EOSINOPHIL NFR BLD AUTO: 6 % — SIGNIFICANT CHANGE UP (ref 0–6)
EOSINOPHIL NFR BLD AUTO: 6 % — SIGNIFICANT CHANGE UP (ref 0–6)
EOSINOPHIL NFR BLD AUTO: 6.8 % — HIGH (ref 0–6)
EOSINOPHIL NFR BLD AUTO: 7 % — HIGH (ref 0–6)
EOSINOPHIL NFR BLD AUTO: 7 % — HIGH (ref 0–6)
EOSINOPHIL NFR BLD AUTO: 7.1 % — HIGH (ref 0–6)
EOSINOPHIL NFR BLD AUTO: 7.5 % — HIGH (ref 0–6)
EOSINOPHIL NFR BLD AUTO: 7.9 % — HIGH (ref 0–6)
EOSINOPHIL NFR BLD AUTO: 8.8 % — HIGH (ref 0–6)
FIBRINOGEN PPP-MCNC: 334 MG/DL — SIGNIFICANT CHANGE UP (ref 290–520)
FUNGITELL: <31 PG/ML — SIGNIFICANT CHANGE UP
GAS PNL BLDA: SIGNIFICANT CHANGE UP
GAS PNL BLDMV: SIGNIFICANT CHANGE UP
GIANT PLATELETS BLD QL SMEAR: PRESENT — SIGNIFICANT CHANGE UP
GLUCOSE BLDC GLUCOMTR-MCNC: 155 MG/DL — HIGH (ref 70–99)
GLUCOSE BLDC GLUCOMTR-MCNC: 175 MG/DL — HIGH (ref 70–99)
GLUCOSE SERPL-MCNC: 100 MG/DL
GLUCOSE SERPL-MCNC: 119 MG/DL — HIGH (ref 70–99)
GLUCOSE SERPL-MCNC: 120 MG/DL
GLUCOSE SERPL-MCNC: 120 MG/DL — HIGH (ref 70–99)
GLUCOSE SERPL-MCNC: 123 MG/DL
GLUCOSE SERPL-MCNC: 124 MG/DL — HIGH (ref 70–99)
GLUCOSE SERPL-MCNC: 125 MG/DL
GLUCOSE SERPL-MCNC: 128 MG/DL
GLUCOSE SERPL-MCNC: 129 MG/DL — HIGH (ref 70–99)
GLUCOSE SERPL-MCNC: 136 MG/DL
GLUCOSE SERPL-MCNC: 146 MG/DL
GLUCOSE SERPL-MCNC: 148 MG/DL
GLUCOSE SERPL-MCNC: 150 MG/DL
GLUCOSE SERPL-MCNC: 150 MG/DL
GLUCOSE SERPL-MCNC: 156 MG/DL — HIGH (ref 70–99)
GLUCOSE SERPL-MCNC: 160 MG/DL — HIGH (ref 70–99)
GLUCOSE SERPL-MCNC: 177 MG/DL — HIGH (ref 70–99)
GLUCOSE SERPL-MCNC: 95 MG/DL — SIGNIFICANT CHANGE UP (ref 70–99)
GLUCOSE SERPL-MCNC: 95 MG/DL — SIGNIFICANT CHANGE UP (ref 70–99)
GLUCOSE SERPL-MCNC: 97 MG/DL — SIGNIFICANT CHANGE UP (ref 70–99)
GLUCOSE SERPL-MCNC: 99 MG/DL
GLUCOSE UR QL: NEGATIVE MG/DL — SIGNIFICANT CHANGE UP
GLUCOSE UR QL: NEGATIVE — SIGNIFICANT CHANGE UP
GRAM STN FLD: SIGNIFICANT CHANGE UP
HAPTOGLOB SERPL-MCNC: 100 MG/DL — SIGNIFICANT CHANGE UP (ref 34–200)
HAV IGM SER-ACNC: SIGNIFICANT CHANGE UP
HBV CORE IGM SER-ACNC: SIGNIFICANT CHANGE UP
HBV SURFACE AG SER-ACNC: SIGNIFICANT CHANGE UP
HCO3 BLDMV-SCNC: 17 MMOL/L — LOW (ref 20–28)
HCO3 BLDMV-SCNC: 25 MMOL/L — SIGNIFICANT CHANGE UP (ref 20–28)
HCO3 BLDMV-SCNC: 27 MMOL/L — SIGNIFICANT CHANGE UP (ref 20–28)
HCT VFR BLD CALC: 18.7 % — CRITICAL LOW (ref 39–50)
HCT VFR BLD CALC: 18.9 % — CRITICAL LOW (ref 39–50)
HCT VFR BLD CALC: 20.2 % — CRITICAL LOW (ref 39–50)
HCT VFR BLD CALC: 20.8 % — CRITICAL LOW (ref 39–50)
HCT VFR BLD CALC: 22.9 % — LOW (ref 39–50)
HCT VFR BLD CALC: 25 % — LOW (ref 39–50)
HCT VFR BLD CALC: 27 % — LOW (ref 39–50)
HCT VFR BLD CALC: 27.2 % — LOW (ref 39–50)
HCT VFR BLD CALC: 27.9 % — LOW (ref 39–50)
HCT VFR BLD CALC: 28.4 % — LOW (ref 39–50)
HCT VFR BLD CALC: 28.4 % — LOW (ref 39–50)
HCT VFR BLD CALC: 29 % — LOW (ref 39–50)
HCT VFR BLD CALC: 29.9 % — LOW (ref 39–50)
HCT VFR BLD CALC: 30.2 % — LOW (ref 39–50)
HCT VFR BLD CALC: 30.2 % — LOW (ref 39–50)
HCT VFR BLD CALC: 30.6 % — LOW (ref 39–50)
HCT VFR BLD CALC: 31.4 % — LOW (ref 39–50)
HCT VFR BLD CALC: 31.5 % — LOW (ref 39–50)
HCT VFR BLD CALC: 32.2 % — LOW (ref 39–50)
HCT VFR BLD CALC: 32.9 % — LOW (ref 39–50)
HCT VFR BLD CALC: 33.1 % — LOW (ref 39–50)
HCT VFR BLD CALC: 34.2 % — LOW (ref 39–50)
HCT VFR BLD CALC: 34.9 % — LOW (ref 39–50)
HCT VFR BLD CALC: 35.6 % — LOW (ref 39–50)
HCV AB S/CO SERPL IA: 0.04 S/CO — SIGNIFICANT CHANGE UP (ref 0–0.99)
HCV AB SERPL-IMP: SIGNIFICANT CHANGE UP
HGB BLD-MCNC: 10 G/DL — LOW (ref 13–17)
HGB BLD-MCNC: 10 G/DL — LOW (ref 13–17)
HGB BLD-MCNC: 10.5 G/DL — LOW (ref 13–17)
HGB BLD-MCNC: 10.5 G/DL — LOW (ref 13–17)
HGB BLD-MCNC: 10.6 G/DL — LOW (ref 13–17)
HGB BLD-MCNC: 10.7 G/DL — LOW (ref 13–17)
HGB BLD-MCNC: 10.9 G/DL — LOW (ref 13–17)
HGB BLD-MCNC: 11 G/DL — LOW (ref 13–17)
HGB BLD-MCNC: 6.1 G/DL — CRITICAL LOW (ref 13–17)
HGB BLD-MCNC: 6.2 G/DL — CRITICAL LOW (ref 13–17)
HGB BLD-MCNC: 6.6 G/DL — CRITICAL LOW (ref 13–17)
HGB BLD-MCNC: 7 G/DL — CRITICAL LOW (ref 13–17)
HGB BLD-MCNC: 7.6 G/DL — LOW (ref 13–17)
HGB BLD-MCNC: 8.4 G/DL — LOW (ref 13–17)
HGB BLD-MCNC: 8.6 G/DL — LOW (ref 13–17)
HGB BLD-MCNC: 8.6 G/DL — LOW (ref 13–17)
HGB BLD-MCNC: 8.7 G/DL — LOW (ref 13–17)
HGB BLD-MCNC: 8.8 G/DL — LOW (ref 13–17)
HGB BLD-MCNC: 9 G/DL — LOW (ref 13–17)
HGB BLD-MCNC: 9.2 G/DL — LOW (ref 13–17)
HGB BLD-MCNC: 9.7 G/DL — LOW (ref 13–17)
HGB BLD-MCNC: 9.9 G/DL — LOW (ref 13–17)
HOROWITZ INDEX BLDMV+IHG-RTO: 21 — SIGNIFICANT CHANGE UP
HOROWITZ INDEX BLDMV+IHG-RTO: 21 — SIGNIFICANT CHANGE UP
HOROWITZ INDEX BLDMV+IHG-RTO: 28 — SIGNIFICANT CHANGE UP
HOROWITZ INDEX BLDMV+IHG-RTO: 28 — SIGNIFICANT CHANGE UP
HOROWITZ INDEX BLDMV+IHG-RTO: 30 — SIGNIFICANT CHANGE UP
HOROWITZ INDEX BLDMV+IHG-RTO: 32 — SIGNIFICANT CHANGE UP
HYPOCHROMIA BLD QL: SLIGHT — SIGNIFICANT CHANGE UP
IMM GRANULOCYTES NFR BLD AUTO: 0.4 % — SIGNIFICANT CHANGE UP (ref 0–1.5)
IMM GRANULOCYTES NFR BLD AUTO: 0.4 % — SIGNIFICANT CHANGE UP (ref 0–1.5)
IMM GRANULOCYTES NFR BLD AUTO: 0.6 % — SIGNIFICANT CHANGE UP (ref 0–1.5)
IMM GRANULOCYTES NFR BLD AUTO: 0.6 % — SIGNIFICANT CHANGE UP (ref 0–1.5)
IMM GRANULOCYTES NFR BLD AUTO: 0.7 % — SIGNIFICANT CHANGE UP (ref 0–1.5)
IMM GRANULOCYTES NFR BLD AUTO: 0.9 % — SIGNIFICANT CHANGE UP (ref 0–1.5)
INR BLD: 1.61 RATIO — HIGH (ref 0.88–1.16)
KETONES UR-MCNC: NEGATIVE — SIGNIFICANT CHANGE UP
KETONES UR-MCNC: NEGATIVE — SIGNIFICANT CHANGE UP
LACTATE BLDV-MCNC: 0.9 MMOL/L — SIGNIFICANT CHANGE UP (ref 0.7–2)
LACTATE BLDV-MCNC: 1 MMOL/L — SIGNIFICANT CHANGE UP (ref 0.7–2)
LACTATE SERPL-SCNC: 1 MMOL/L — SIGNIFICANT CHANGE UP (ref 0.7–2)
LDH SERPL L TO P-CCNC: 281 U/L — HIGH (ref 50–242)
LDH SERPL-CCNC: 184 U/L
LDH SERPL-CCNC: 196 U/L
LDH SERPL-CCNC: 201 U/L
LDH SERPL-CCNC: 203 U/L
LDH SERPL-CCNC: 211 U/L
LDH SERPL-CCNC: 214 U/L
LDH SERPL-CCNC: 220 U/L
LDH SERPL-CCNC: 224 U/L
LDH SERPL-CCNC: 227 U/L
LDH SERPL-CCNC: 245 U/L
LDH SERPL-CCNC: 267 U/L
LEGIONELLA AG UR QL: NEGATIVE — SIGNIFICANT CHANGE UP
LEUKOCYTE ESTERASE UR-ACNC: NEGATIVE — SIGNIFICANT CHANGE UP
LEUKOCYTE ESTERASE UR-ACNC: NEGATIVE — SIGNIFICANT CHANGE UP
LIDOCAIN IGE QN: 23 U/L — SIGNIFICANT CHANGE UP (ref 22–51)
LYMPHOCYTES # BLD AUTO: 0.93 K/UL — LOW (ref 1–3.3)
LYMPHOCYTES # BLD AUTO: 1 K/UL — SIGNIFICANT CHANGE UP (ref 1–3.3)
LYMPHOCYTES # BLD AUTO: 1.06 K/UL — SIGNIFICANT CHANGE UP (ref 1–3.3)
LYMPHOCYTES # BLD AUTO: 1.09 K/UL — SIGNIFICANT CHANGE UP (ref 1–3.3)
LYMPHOCYTES # BLD AUTO: 1.2 K/UL — SIGNIFICANT CHANGE UP (ref 1–3.3)
LYMPHOCYTES # BLD AUTO: 1.21 K/UL — SIGNIFICANT CHANGE UP (ref 1–3.3)
LYMPHOCYTES # BLD AUTO: 1.53 K/UL — SIGNIFICANT CHANGE UP (ref 1–3.3)
LYMPHOCYTES # BLD AUTO: 1.62 K/UL — SIGNIFICANT CHANGE UP (ref 1–3.3)
LYMPHOCYTES # BLD AUTO: 1.63 K/UL — SIGNIFICANT CHANGE UP (ref 1–3.3)
LYMPHOCYTES # BLD AUTO: 1.66 K/UL — SIGNIFICANT CHANGE UP (ref 1–3.3)
LYMPHOCYTES # BLD AUTO: 1.67 K/UL — SIGNIFICANT CHANGE UP (ref 1–3.3)
LYMPHOCYTES # BLD AUTO: 1.72 K/UL — SIGNIFICANT CHANGE UP (ref 1–3.3)
LYMPHOCYTES # BLD AUTO: 1.92 K/UL — SIGNIFICANT CHANGE UP (ref 1–3.3)
LYMPHOCYTES # BLD AUTO: 16 % — SIGNIFICANT CHANGE UP (ref 13–44)
LYMPHOCYTES # BLD AUTO: 18.2 % — SIGNIFICANT CHANGE UP (ref 13–44)
LYMPHOCYTES # BLD AUTO: 2.06 K/UL — SIGNIFICANT CHANGE UP (ref 1–3.3)
LYMPHOCYTES # BLD AUTO: 2.37 K/UL — SIGNIFICANT CHANGE UP (ref 1–3.3)
LYMPHOCYTES # BLD AUTO: 2.38 K/UL — SIGNIFICANT CHANGE UP (ref 1–3.3)
LYMPHOCYTES # BLD AUTO: 2.69 K/UL — SIGNIFICANT CHANGE UP (ref 1–3.3)
LYMPHOCYTES # BLD AUTO: 2.99 K/UL — SIGNIFICANT CHANGE UP (ref 1–3.3)
LYMPHOCYTES # BLD AUTO: 20.2 % — SIGNIFICANT CHANGE UP (ref 13–44)
LYMPHOCYTES # BLD AUTO: 21 % — SIGNIFICANT CHANGE UP (ref 13–44)
LYMPHOCYTES # BLD AUTO: 24 % — SIGNIFICANT CHANGE UP (ref 13–44)
LYMPHOCYTES # BLD AUTO: 25 % — SIGNIFICANT CHANGE UP (ref 13–44)
LYMPHOCYTES # BLD AUTO: 27 % — SIGNIFICANT CHANGE UP (ref 13–44)
LYMPHOCYTES # BLD AUTO: 27 % — SIGNIFICANT CHANGE UP (ref 13–44)
LYMPHOCYTES # BLD AUTO: 28 % — SIGNIFICANT CHANGE UP (ref 13–44)
LYMPHOCYTES # BLD AUTO: 28.8 % — SIGNIFICANT CHANGE UP (ref 13–44)
LYMPHOCYTES # BLD AUTO: 29.8 % — SIGNIFICANT CHANGE UP (ref 13–44)
LYMPHOCYTES # BLD AUTO: 3.1 K/UL — SIGNIFICANT CHANGE UP (ref 1–3.3)
LYMPHOCYTES # BLD AUTO: 3.47 K/UL — HIGH (ref 1–3.3)
LYMPHOCYTES # BLD AUTO: 3.57 K/UL — HIGH (ref 1–3.3)
LYMPHOCYTES # BLD AUTO: 30 % — SIGNIFICANT CHANGE UP (ref 13–44)
LYMPHOCYTES # BLD AUTO: 31.3 % — SIGNIFICANT CHANGE UP (ref 13–44)
LYMPHOCYTES # BLD AUTO: 32 % — SIGNIFICANT CHANGE UP (ref 13–44)
LYMPHOCYTES # BLD AUTO: 32.1 % — SIGNIFICANT CHANGE UP (ref 13–44)
LYMPHOCYTES # BLD AUTO: 33 % — SIGNIFICANT CHANGE UP (ref 13–44)
LYMPHOCYTES # BLD AUTO: 33 % — SIGNIFICANT CHANGE UP (ref 13–44)
LYMPHOCYTES # BLD AUTO: 38 % — SIGNIFICANT CHANGE UP (ref 13–44)
LYMPHOCYTES # BLD AUTO: 43.4 % — SIGNIFICANT CHANGE UP (ref 13–44)
MACROCYTES BLD QL: SIGNIFICANT CHANGE UP
MACROCYTES BLD QL: SIGNIFICANT CHANGE UP
MACROCYTES BLD QL: SLIGHT — SIGNIFICANT CHANGE UP
MAGNESIUM SERPL-MCNC: 1.5 MG/DL
MAGNESIUM SERPL-MCNC: 1.6 MG/DL
MAGNESIUM SERPL-MCNC: 1.7 MG/DL
MAGNESIUM SERPL-MCNC: 1.8 MG/DL
MAGNESIUM SERPL-MCNC: 1.8 MG/DL — SIGNIFICANT CHANGE UP (ref 1.6–2.6)
MAGNESIUM SERPL-MCNC: 1.9 MG/DL
MAGNESIUM SERPL-MCNC: 1.9 MG/DL — SIGNIFICANT CHANGE UP (ref 1.6–2.6)
MAGNESIUM SERPL-MCNC: 2 MG/DL
MAGNESIUM SERPL-MCNC: 2.1 MG/DL
MAGNESIUM SERPL-MCNC: 2.2 MG/DL — SIGNIFICANT CHANGE UP (ref 1.6–2.6)
MAGNESIUM SERPL-MCNC: 2.2 MG/DL — SIGNIFICANT CHANGE UP (ref 1.6–2.6)
MANUAL SMEAR VERIFICATION: SIGNIFICANT CHANGE UP
MCHC RBC-ENTMCNC: 30.4 G/DL — LOW (ref 32–36)
MCHC RBC-ENTMCNC: 30.5 G/DL — LOW (ref 32–36)
MCHC RBC-ENTMCNC: 30.8 G/DL — LOW (ref 32–36)
MCHC RBC-ENTMCNC: 30.8 PG — SIGNIFICANT CHANGE UP (ref 27–34)
MCHC RBC-ENTMCNC: 30.9 G/DL — LOW (ref 32–36)
MCHC RBC-ENTMCNC: 31 G/DL — LOW (ref 32–36)
MCHC RBC-ENTMCNC: 31.4 G/DL — LOW (ref 32–36)
MCHC RBC-ENTMCNC: 31.4 PG — SIGNIFICANT CHANGE UP (ref 27–34)
MCHC RBC-ENTMCNC: 31.4 PG — SIGNIFICANT CHANGE UP (ref 27–34)
MCHC RBC-ENTMCNC: 31.6 G/DL — LOW (ref 32–36)
MCHC RBC-ENTMCNC: 31.7 G/DL — LOW (ref 32–36)
MCHC RBC-ENTMCNC: 31.7 G/DL — LOW (ref 32–36)
MCHC RBC-ENTMCNC: 31.7 GM/DL — LOW (ref 32–36)
MCHC RBC-ENTMCNC: 31.7 PG — SIGNIFICANT CHANGE UP (ref 27–34)
MCHC RBC-ENTMCNC: 31.8 GM/DL — LOW (ref 32–36)
MCHC RBC-ENTMCNC: 31.8 PG — SIGNIFICANT CHANGE UP (ref 27–34)
MCHC RBC-ENTMCNC: 31.9 G/DL — LOW (ref 32–36)
MCHC RBC-ENTMCNC: 31.9 G/DL — LOW (ref 32–36)
MCHC RBC-ENTMCNC: 32.1 G/DL — SIGNIFICANT CHANGE UP (ref 32–36)
MCHC RBC-ENTMCNC: 32.2 G/DL — SIGNIFICANT CHANGE UP (ref 32–36)
MCHC RBC-ENTMCNC: 32.2 PG — SIGNIFICANT CHANGE UP (ref 27–34)
MCHC RBC-ENTMCNC: 32.3 G/DL — SIGNIFICANT CHANGE UP (ref 32–36)
MCHC RBC-ENTMCNC: 32.4 PG — SIGNIFICANT CHANGE UP (ref 27–34)
MCHC RBC-ENTMCNC: 32.6 G/DL — SIGNIFICANT CHANGE UP (ref 32–36)
MCHC RBC-ENTMCNC: 32.6 G/DL — SIGNIFICANT CHANGE UP (ref 32–36)
MCHC RBC-ENTMCNC: 32.7 PG — SIGNIFICANT CHANGE UP (ref 27–34)
MCHC RBC-ENTMCNC: 32.7 PG — SIGNIFICANT CHANGE UP (ref 27–34)
MCHC RBC-ENTMCNC: 32.8 GM/DL — SIGNIFICANT CHANGE UP (ref 32–36)
MCHC RBC-ENTMCNC: 32.8 PG — SIGNIFICANT CHANGE UP (ref 27–34)
MCHC RBC-ENTMCNC: 32.8 PG — SIGNIFICANT CHANGE UP (ref 27–34)
MCHC RBC-ENTMCNC: 33.1 PG — SIGNIFICANT CHANGE UP (ref 27–34)
MCHC RBC-ENTMCNC: 33.2 GM/DL — SIGNIFICANT CHANGE UP (ref 32–36)
MCHC RBC-ENTMCNC: 33.2 PG — SIGNIFICANT CHANGE UP (ref 27–34)
MCHC RBC-ENTMCNC: 33.4 GM/DL — SIGNIFICANT CHANGE UP (ref 32–36)
MCHC RBC-ENTMCNC: 33.4 GM/DL — SIGNIFICANT CHANGE UP (ref 32–36)
MCHC RBC-ENTMCNC: 33.6 GM/DL — SIGNIFICANT CHANGE UP (ref 32–36)
MCHC RBC-ENTMCNC: 33.6 PG — SIGNIFICANT CHANGE UP (ref 27–34)
MCHC RBC-ENTMCNC: 33.7 PG — SIGNIFICANT CHANGE UP (ref 27–34)
MCHC RBC-ENTMCNC: 34 PG — SIGNIFICANT CHANGE UP (ref 27–34)
MCHC RBC-ENTMCNC: 34.1 PG — HIGH (ref 27–34)
MCHC RBC-ENTMCNC: 34.1 PG — HIGH (ref 27–34)
MCHC RBC-ENTMCNC: 34.2 PG — HIGH (ref 27–34)
MCHC RBC-ENTMCNC: 34.4 PG — HIGH (ref 27–34)
MCHC RBC-ENTMCNC: 34.4 PG — HIGH (ref 27–34)
MCHC RBC-ENTMCNC: 34.6 PG — HIGH (ref 27–34)
MCHC RBC-ENTMCNC: 34.6 PG — HIGH (ref 27–34)
MCHC RBC-ENTMCNC: 34.7 GM/DL — SIGNIFICANT CHANGE UP (ref 32–36)
MCHC RBC-ENTMCNC: 35 PG — HIGH (ref 27–34)
MCV RBC AUTO: 100.3 FL — HIGH (ref 80–100)
MCV RBC AUTO: 101.7 FL — HIGH (ref 80–100)
MCV RBC AUTO: 101.8 FL — HIGH (ref 80–100)
MCV RBC AUTO: 101.9 FL — HIGH (ref 80–100)
MCV RBC AUTO: 102.3 FL — HIGH (ref 80–100)
MCV RBC AUTO: 102.4 FL — HIGH (ref 80–100)
MCV RBC AUTO: 102.8 FL — HIGH (ref 80–100)
MCV RBC AUTO: 104 FL — HIGH (ref 80–100)
MCV RBC AUTO: 104 FL — HIGH (ref 80–100)
MCV RBC AUTO: 104.3 FL — HIGH (ref 80–100)
MCV RBC AUTO: 104.5 FL — HIGH (ref 80–100)
MCV RBC AUTO: 106 FL — HIGH (ref 80–100)
MCV RBC AUTO: 106.4 FL — HIGH (ref 80–100)
MCV RBC AUTO: 108.8 FL — HIGH (ref 80–100)
MCV RBC AUTO: 109.9 FL — HIGH (ref 80–100)
MCV RBC AUTO: 110.1 FL — HIGH (ref 80–100)
MCV RBC AUTO: 110.2 FL — HIGH (ref 80–100)
MCV RBC AUTO: 113.5 FL — HIGH (ref 80–100)
MCV RBC AUTO: 115.2 FL — HIGH (ref 80–100)
MCV RBC AUTO: 90.6 FL — SIGNIFICANT CHANGE UP (ref 80–100)
MCV RBC AUTO: 92.7 FL — SIGNIFICANT CHANGE UP (ref 80–100)
MCV RBC AUTO: 94.3 FL — SIGNIFICANT CHANGE UP (ref 80–100)
MCV RBC AUTO: 96.9 FL — SIGNIFICANT CHANGE UP (ref 80–100)
MCV RBC AUTO: 99 FL — SIGNIFICANT CHANGE UP (ref 80–100)
METHOD TYPE: SIGNIFICANT CHANGE UP
METHOD TYPE: SIGNIFICANT CHANGE UP
MONOCYTES # BLD AUTO: 0.36 K/UL — SIGNIFICANT CHANGE UP (ref 0–0.9)
MONOCYTES # BLD AUTO: 0.52 K/UL — SIGNIFICANT CHANGE UP (ref 0–0.9)
MONOCYTES # BLD AUTO: 0.54 K/UL — SIGNIFICANT CHANGE UP (ref 0–0.9)
MONOCYTES # BLD AUTO: 0.62 K/UL — SIGNIFICANT CHANGE UP (ref 0–0.9)
MONOCYTES # BLD AUTO: 0.63 K/UL — SIGNIFICANT CHANGE UP (ref 0–0.9)
MONOCYTES # BLD AUTO: 0.68 K/UL — SIGNIFICANT CHANGE UP (ref 0–0.9)
MONOCYTES # BLD AUTO: 0.69 K/UL — SIGNIFICANT CHANGE UP (ref 0–0.9)
MONOCYTES # BLD AUTO: 0.71 K/UL — SIGNIFICANT CHANGE UP (ref 0–0.9)
MONOCYTES # BLD AUTO: 0.97 K/UL — HIGH (ref 0–0.9)
MONOCYTES # BLD AUTO: 1.08 K/UL — HIGH (ref 0–0.9)
MONOCYTES # BLD AUTO: 1.09 K/UL — HIGH (ref 0–0.9)
MONOCYTES # BLD AUTO: 1.15 K/UL — HIGH (ref 0–0.9)
MONOCYTES # BLD AUTO: 1.22 K/UL — HIGH (ref 0–0.9)
MONOCYTES # BLD AUTO: 1.25 K/UL — HIGH (ref 0–0.9)
MONOCYTES # BLD AUTO: 1.26 K/UL — HIGH (ref 0–0.9)
MONOCYTES # BLD AUTO: 1.26 K/UL — HIGH (ref 0–0.9)
MONOCYTES # BLD AUTO: 1.27 K/UL — HIGH (ref 0–0.9)
MONOCYTES # BLD AUTO: 1.31 K/UL — HIGH (ref 0–0.9)
MONOCYTES # BLD AUTO: 1.35 K/UL — HIGH (ref 0–0.9)
MONOCYTES # BLD AUTO: 1.38 K/UL — HIGH (ref 0–0.9)
MONOCYTES # BLD AUTO: 1.85 K/UL — HIGH (ref 0–0.9)
MONOCYTES NFR BLD AUTO: 10 % — SIGNIFICANT CHANGE UP (ref 2–14)
MONOCYTES NFR BLD AUTO: 11 % — SIGNIFICANT CHANGE UP (ref 2–14)
MONOCYTES NFR BLD AUTO: 11 % — SIGNIFICANT CHANGE UP (ref 2–14)
MONOCYTES NFR BLD AUTO: 11.9 % — SIGNIFICANT CHANGE UP (ref 2–14)
MONOCYTES NFR BLD AUTO: 12.1 % — SIGNIFICANT CHANGE UP (ref 2–14)
MONOCYTES NFR BLD AUTO: 12.2 % — SIGNIFICANT CHANGE UP (ref 2–14)
MONOCYTES NFR BLD AUTO: 13.2 % — SIGNIFICANT CHANGE UP (ref 2–14)
MONOCYTES NFR BLD AUTO: 14 % — SIGNIFICANT CHANGE UP (ref 2–14)
MONOCYTES NFR BLD AUTO: 15 % — HIGH (ref 2–14)
MONOCYTES NFR BLD AUTO: 15.3 % — HIGH (ref 2–14)
MONOCYTES NFR BLD AUTO: 15.4 % — HIGH (ref 2–14)
MONOCYTES NFR BLD AUTO: 15.9 % — HIGH (ref 2–14)
MONOCYTES NFR BLD AUTO: 18 % — HIGH (ref 2–14)
MONOCYTES NFR BLD AUTO: 18 % — HIGH (ref 2–14)
MONOCYTES NFR BLD AUTO: 23.5 % — HIGH (ref 2–14)
MONOCYTES NFR BLD AUTO: 25 % — HIGH (ref 2–14)
MONOCYTES NFR BLD AUTO: 28 % — HIGH (ref 2–14)
MONOCYTES NFR BLD AUTO: 5.3 % — SIGNIFICANT CHANGE UP (ref 2–14)
MONOCYTES NFR BLD AUTO: 9.6 % — SIGNIFICANT CHANGE UP (ref 2–14)
MRSA PCR RESULT.: SIGNIFICANT CHANGE UP
MSSA DNA SPEC QL NAA+PROBE: SIGNIFICANT CHANGE UP
NEUTROPHILS # BLD AUTO: 1.52 K/UL — LOW (ref 1.8–7.4)
NEUTROPHILS # BLD AUTO: 1.71 K/UL — LOW (ref 1.8–7.4)
NEUTROPHILS # BLD AUTO: 1.82 K/UL — SIGNIFICANT CHANGE UP (ref 1.8–7.4)
NEUTROPHILS # BLD AUTO: 1.94 K/UL — SIGNIFICANT CHANGE UP (ref 1.8–7.4)
NEUTROPHILS # BLD AUTO: 2.66 K/UL — SIGNIFICANT CHANGE UP (ref 1.8–7.4)
NEUTROPHILS # BLD AUTO: 2.7 K/UL — SIGNIFICANT CHANGE UP (ref 1.8–7.4)
NEUTROPHILS # BLD AUTO: 2.88 K/UL — SIGNIFICANT CHANGE UP (ref 1.8–7.4)
NEUTROPHILS # BLD AUTO: 2.9 K/UL — SIGNIFICANT CHANGE UP (ref 1.8–7.4)
NEUTROPHILS # BLD AUTO: 3.2 K/UL — SIGNIFICANT CHANGE UP (ref 1.8–7.4)
NEUTROPHILS # BLD AUTO: 3.23 K/UL — SIGNIFICANT CHANGE UP (ref 1.8–7.4)
NEUTROPHILS # BLD AUTO: 3.25 K/UL — SIGNIFICANT CHANGE UP (ref 1.8–7.4)
NEUTROPHILS # BLD AUTO: 3.52 K/UL — SIGNIFICANT CHANGE UP (ref 1.8–7.4)
NEUTROPHILS # BLD AUTO: 3.88 K/UL — SIGNIFICANT CHANGE UP (ref 1.8–7.4)
NEUTROPHILS # BLD AUTO: 4.22 K/UL — SIGNIFICANT CHANGE UP (ref 1.8–7.4)
NEUTROPHILS # BLD AUTO: 4.31 K/UL — SIGNIFICANT CHANGE UP (ref 1.8–7.4)
NEUTROPHILS # BLD AUTO: 4.39 K/UL — SIGNIFICANT CHANGE UP (ref 1.8–7.4)
NEUTROPHILS # BLD AUTO: 4.7 K/UL — SIGNIFICANT CHANGE UP (ref 1.8–7.4)
NEUTROPHILS # BLD AUTO: 4.86 K/UL — SIGNIFICANT CHANGE UP (ref 1.8–7.4)
NEUTROPHILS # BLD AUTO: 4.95 K/UL — SIGNIFICANT CHANGE UP (ref 1.8–7.4)
NEUTROPHILS # BLD AUTO: 4.95 K/UL — SIGNIFICANT CHANGE UP (ref 1.8–7.4)
NEUTROPHILS # BLD AUTO: 7.13 K/UL — SIGNIFICANT CHANGE UP (ref 1.8–7.4)
NEUTROPHILS NFR BLD AUTO: 30 % — LOW (ref 43–77)
NEUTROPHILS NFR BLD AUTO: 35.9 % — LOW (ref 43–77)
NEUTROPHILS NFR BLD AUTO: 36.6 % — LOW (ref 43–77)
NEUTROPHILS NFR BLD AUTO: 38 % — LOW (ref 43–77)
NEUTROPHILS NFR BLD AUTO: 41 % — LOW (ref 43–77)
NEUTROPHILS NFR BLD AUTO: 46 % — SIGNIFICANT CHANGE UP (ref 43–77)
NEUTROPHILS NFR BLD AUTO: 46 % — SIGNIFICANT CHANGE UP (ref 43–77)
NEUTROPHILS NFR BLD AUTO: 47.3 % — SIGNIFICANT CHANGE UP (ref 43–77)
NEUTROPHILS NFR BLD AUTO: 48 % — SIGNIFICANT CHANGE UP (ref 43–77)
NEUTROPHILS NFR BLD AUTO: 48.6 % — SIGNIFICANT CHANGE UP (ref 43–77)
NEUTROPHILS NFR BLD AUTO: 48.9 % — SIGNIFICANT CHANGE UP (ref 43–77)
NEUTROPHILS NFR BLD AUTO: 52 % — SIGNIFICANT CHANGE UP (ref 43–77)
NEUTROPHILS NFR BLD AUTO: 54 % — SIGNIFICANT CHANGE UP (ref 43–77)
NEUTROPHILS NFR BLD AUTO: 54 % — SIGNIFICANT CHANGE UP (ref 43–77)
NEUTROPHILS NFR BLD AUTO: 57 % — SIGNIFICANT CHANGE UP (ref 43–77)
NEUTROPHILS NFR BLD AUTO: 58 % — SIGNIFICANT CHANGE UP (ref 43–77)
NEUTROPHILS NFR BLD AUTO: 62 % — SIGNIFICANT CHANGE UP (ref 43–77)
NEUTROPHILS NFR BLD AUTO: 62.6 % — SIGNIFICANT CHANGE UP (ref 43–77)
NEUTROPHILS NFR BLD AUTO: 64 % — SIGNIFICANT CHANGE UP (ref 43–77)
NEUTROPHILS NFR BLD AUTO: 64.9 % — SIGNIFICANT CHANGE UP (ref 43–77)
NEUTROPHILS NFR BLD AUTO: 66 % — SIGNIFICANT CHANGE UP (ref 43–77)
NEUTS BAND # BLD: 0.9 % — SIGNIFICANT CHANGE UP (ref 0–8)
NITRITE UR-MCNC: NEGATIVE — SIGNIFICANT CHANGE UP
NITRITE UR-MCNC: NEGATIVE — SIGNIFICANT CHANGE UP
NRBC # BLD: 0 /100 WBCS — SIGNIFICANT CHANGE UP (ref 0–0)
NRBC # BLD: 0 /100 — SIGNIFICANT CHANGE UP (ref 0–0)
NRBC # BLD: 1 /100 — HIGH (ref 0–0)
NRBC # BLD: 2 /100 — HIGH (ref 0–0)
NRBC # BLD: 2 /100 — HIGH (ref 0–0)
NRBC # BLD: 5 /100 — HIGH (ref 0–0)
NRBC # BLD: SIGNIFICANT CHANGE UP /100 WBCS (ref 0–0)
NT-PROBNP SERPL-SCNC: HIGH PG/ML (ref 0–300)
NT-PROBNP SERPL-SCNC: HIGH PG/ML (ref 0–300)
O2 CT VFR BLD CALC: 21 MMHG — LOW (ref 30–65)
O2 CT VFR BLD CALC: 24 MMHG — LOW (ref 30–65)
O2 CT VFR BLD CALC: 25 MMHG — LOW (ref 30–65)
O2 CT VFR BLD CALC: 27 MMHG — LOW (ref 30–65)
O2 CT VFR BLD CALC: 36 MMHG — SIGNIFICANT CHANGE UP (ref 30–65)
O2 CT VFR BLD CALC: 38 MMHG — SIGNIFICANT CHANGE UP (ref 30–65)
ORGANISM # SPEC MICROSCOPIC CNT: SIGNIFICANT CHANGE UP
OVALOCYTES BLD QL SMEAR: SLIGHT — SIGNIFICANT CHANGE UP
PCO2 BLDMV: 38 MMHG — SIGNIFICANT CHANGE UP (ref 30–65)
PCO2 BLDMV: 42 MMHG — SIGNIFICANT CHANGE UP (ref 30–65)
PCO2 BLDMV: 43 MMHG — SIGNIFICANT CHANGE UP (ref 30–65)
PCO2 BLDMV: 44 MMHG — SIGNIFICANT CHANGE UP (ref 30–65)
PCO2 BLDMV: 44 MMHG — SIGNIFICANT CHANGE UP (ref 30–65)
PCO2 BLDMV: 45 MMHG — SIGNIFICANT CHANGE UP (ref 30–65)
PH BLDMV: 7.26 — LOW (ref 7.32–7.45)
PH BLDMV: 7.38 — SIGNIFICANT CHANGE UP (ref 7.32–7.45)
PH BLDMV: 7.38 — SIGNIFICANT CHANGE UP (ref 7.32–7.45)
PH BLDMV: 7.39 — SIGNIFICANT CHANGE UP (ref 7.32–7.45)
PH BLDMV: 7.39 — SIGNIFICANT CHANGE UP (ref 7.32–7.45)
PH BLDMV: 7.4 — SIGNIFICANT CHANGE UP (ref 7.32–7.45)
PH UR: 7.5 — SIGNIFICANT CHANGE UP (ref 5–8)
PH UR: 8 — SIGNIFICANT CHANGE UP (ref 5–8)
PHOSPHATE SERPL-MCNC: 3 MG/DL — SIGNIFICANT CHANGE UP (ref 2.5–4.5)
PHOSPHATE SERPL-MCNC: 3.2 MG/DL — SIGNIFICANT CHANGE UP (ref 2.5–4.5)
PHOSPHATE SERPL-MCNC: 3.8 MG/DL — SIGNIFICANT CHANGE UP (ref 2.5–4.5)
PHOSPHATE SERPL-MCNC: 3.8 MG/DL — SIGNIFICANT CHANGE UP (ref 2.5–4.5)
PLAT MORPH BLD: ABNORMAL
PLAT MORPH BLD: NORMAL — SIGNIFICANT CHANGE UP
PLATELET # BLD AUTO: 41 K/UL — LOW (ref 150–400)
PLATELET # BLD AUTO: 47 K/UL — LOW (ref 150–400)
PLATELET # BLD AUTO: 56 K/UL — LOW (ref 150–400)
PLATELET # BLD AUTO: 56 K/UL — LOW (ref 150–400)
PLATELET # BLD AUTO: 60 K/UL — LOW (ref 150–400)
PLATELET # BLD AUTO: 61 K/UL — LOW (ref 150–400)
PLATELET # BLD AUTO: 63 K/UL — LOW (ref 150–400)
PLATELET # BLD AUTO: 64 K/UL — LOW (ref 150–400)
PLATELET # BLD AUTO: 64 K/UL — LOW (ref 150–400)
PLATELET # BLD AUTO: 65 K/UL — LOW (ref 150–400)
PLATELET # BLD AUTO: 66 K/UL — LOW (ref 150–400)
PLATELET # BLD AUTO: 69 K/UL — LOW (ref 150–400)
PLATELET # BLD AUTO: 70 K/UL — LOW (ref 150–400)
PLATELET # BLD AUTO: 70 K/UL — LOW (ref 150–400)
PLATELET # BLD AUTO: 76 K/UL — LOW (ref 150–400)
PLATELET # BLD AUTO: 77 K/UL — LOW (ref 150–400)
PLATELET # BLD AUTO: 78 K/UL — LOW (ref 150–400)
PLATELET # BLD AUTO: 78 K/UL — LOW (ref 150–400)
PLATELET # BLD AUTO: 81 K/UL — LOW (ref 150–400)
PLATELET # BLD AUTO: 84 K/UL — LOW (ref 150–400)
PLATELET # BLD AUTO: 85 K/UL — LOW (ref 150–400)
POIKILOCYTOSIS BLD QL AUTO: SIGNIFICANT CHANGE UP
POIKILOCYTOSIS BLD QL AUTO: SLIGHT — SIGNIFICANT CHANGE UP
POLYCHROMASIA BLD QL SMEAR: SIGNIFICANT CHANGE UP
POLYCHROMASIA BLD QL SMEAR: SLIGHT — SIGNIFICANT CHANGE UP
POLYCHROMASIA BLD QL SMEAR: SLIGHT — SIGNIFICANT CHANGE UP
POTASSIUM SERPL-MCNC: 3.5 MMOL/L — SIGNIFICANT CHANGE UP (ref 3.5–5.3)
POTASSIUM SERPL-MCNC: 3.6 MMOL/L — SIGNIFICANT CHANGE UP (ref 3.5–5.3)
POTASSIUM SERPL-MCNC: 3.7 MMOL/L — SIGNIFICANT CHANGE UP (ref 3.5–5.3)
POTASSIUM SERPL-MCNC: 3.8 MMOL/L — SIGNIFICANT CHANGE UP (ref 3.5–5.3)
POTASSIUM SERPL-MCNC: 4 MMOL/L — SIGNIFICANT CHANGE UP (ref 3.5–5.3)
POTASSIUM SERPL-MCNC: 4.2 MMOL/L — SIGNIFICANT CHANGE UP (ref 3.5–5.3)
POTASSIUM SERPL-MCNC: 4.2 MMOL/L — SIGNIFICANT CHANGE UP (ref 3.5–5.3)
POTASSIUM SERPL-MCNC: 4.9 MMOL/L — SIGNIFICANT CHANGE UP (ref 3.5–5.3)
POTASSIUM SERPL-SCNC: 3.4 MMOL/L
POTASSIUM SERPL-SCNC: 3.5 MMOL/L
POTASSIUM SERPL-SCNC: 3.5 MMOL/L — SIGNIFICANT CHANGE UP (ref 3.5–5.3)
POTASSIUM SERPL-SCNC: 3.6 MMOL/L — SIGNIFICANT CHANGE UP (ref 3.5–5.3)
POTASSIUM SERPL-SCNC: 3.7 MMOL/L — SIGNIFICANT CHANGE UP (ref 3.5–5.3)
POTASSIUM SERPL-SCNC: 3.8 MMOL/L — SIGNIFICANT CHANGE UP (ref 3.5–5.3)
POTASSIUM SERPL-SCNC: 4 MMOL/L
POTASSIUM SERPL-SCNC: 4 MMOL/L — SIGNIFICANT CHANGE UP (ref 3.5–5.3)
POTASSIUM SERPL-SCNC: 4.1 MMOL/L
POTASSIUM SERPL-SCNC: 4.2 MMOL/L
POTASSIUM SERPL-SCNC: 4.2 MMOL/L — SIGNIFICANT CHANGE UP (ref 3.5–5.3)
POTASSIUM SERPL-SCNC: 4.2 MMOL/L — SIGNIFICANT CHANGE UP (ref 3.5–5.3)
POTASSIUM SERPL-SCNC: 4.4 MMOL/L
POTASSIUM SERPL-SCNC: 4.5 MMOL/L
POTASSIUM SERPL-SCNC: 4.5 MMOL/L
POTASSIUM SERPL-SCNC: 4.6 MMOL/L
POTASSIUM SERPL-SCNC: 4.9 MMOL/L — SIGNIFICANT CHANGE UP (ref 3.5–5.3)
PROCALCITONIN SERPL-MCNC: 0.2 NG/ML — HIGH (ref 0.02–0.1)
PROMYELOCYTES # FLD: 0.9 % — HIGH (ref 0–0)
PROT SERPL-MCNC: 4.4 G/DL — LOW (ref 6.6–8.7)
PROT SERPL-MCNC: 4.5 G/DL — LOW (ref 6.6–8.7)
PROT SERPL-MCNC: 4.5 G/DL — LOW (ref 6–8.3)
PROT SERPL-MCNC: 4.6 G/DL — LOW (ref 6.6–8.7)
PROT SERPL-MCNC: 4.7 G/DL
PROT SERPL-MCNC: 4.8 G/DL — LOW (ref 6–8.3)
PROT SERPL-MCNC: 4.9 G/DL — LOW (ref 6.6–8.7)
PROT SERPL-MCNC: 4.9 G/DL — LOW (ref 6–8.3)
PROT SERPL-MCNC: 5.1 G/DL
PROT SERPL-MCNC: 5.2 G/DL
PROT SERPL-MCNC: 5.3 G/DL
PROT SERPL-MCNC: 5.3 G/DL
PROT SERPL-MCNC: 5.4 G/DL — LOW (ref 6–8.3)
PROT SERPL-MCNC: 5.5 G/DL
PROT SERPL-MCNC: 5.7 G/DL
PROT SERPL-MCNC: 5.8 G/DL
PROT SERPL-MCNC: 5.9 G/DL
PROT UR-MCNC: NEGATIVE MG/DL — SIGNIFICANT CHANGE UP
PROT UR-MCNC: NEGATIVE — SIGNIFICANT CHANGE UP
PROTHROM AB SERPL-ACNC: 18.9 SEC — HIGH (ref 10.6–13.6)
RBC # BLD: 1.79 M/UL — LOW (ref 4.2–5.8)
RBC # BLD: 1.95 M/UL — LOW (ref 4.2–5.8)
RBC # BLD: 2.1 M/UL — LOW (ref 4.2–5.8)
RBC # BLD: 2.23 M/UL — LOW (ref 4.2–5.8)
RBC # BLD: 2.47 M/UL — LOW (ref 4.2–5.8)
RBC # BLD: 2.5 M/UL — LOW (ref 4.2–5.8)
RBC # BLD: 2.55 M/UL — LOW (ref 4.2–5.8)
RBC # BLD: 2.65 M/UL — LOW (ref 4.2–5.8)
RBC # BLD: 2.65 M/UL — LOW (ref 4.2–5.8)
RBC # BLD: 2.66 M/UL — LOW (ref 4.2–5.8)
RBC # BLD: 2.68 M/UL — LOW (ref 4.2–5.8)
RBC # BLD: 2.73 M/UL — LOW (ref 4.2–5.8)
RBC # BLD: 2.85 M/UL — LOW (ref 4.2–5.8)
RBC # BLD: 2.86 M/UL — LOW (ref 4.2–5.8)
RBC # BLD: 2.92 M/UL — LOW (ref 4.2–5.8)
RBC # BLD: 2.97 M/UL — LOW (ref 4.2–5.8)
RBC # BLD: 2.99 M/UL — LOW (ref 4.2–5.8)
RBC # BLD: 3.02 M/UL — LOW (ref 4.2–5.8)
RBC # BLD: 3.03 M/UL — LOW (ref 4.2–5.8)
RBC # BLD: 3.11 M/UL — LOW (ref 4.2–5.8)
RBC # BLD: 3.2 M/UL — LOW (ref 4.2–5.8)
RBC # BLD: 3.21 M/UL — LOW (ref 4.2–5.8)
RBC # BLD: 3.23 M/UL — LOW (ref 4.2–5.8)
RBC # BLD: 3.28 M/UL — LOW (ref 4.2–5.8)
RBC # FLD: 17.4 % — HIGH (ref 10.3–14.5)
RBC # FLD: 17.4 % — HIGH (ref 10.3–14.5)
RBC # FLD: 17.9 % — HIGH (ref 10.3–14.5)
RBC # FLD: 18.2 % — HIGH (ref 10.3–14.5)
RBC # FLD: 18.3 % — HIGH (ref 10.3–14.5)
RBC # FLD: 18.4 % — HIGH (ref 10.3–14.5)
RBC # FLD: 18.5 % — HIGH (ref 10.3–14.5)
RBC # FLD: 18.6 % — HIGH (ref 10.3–14.5)
RBC # FLD: 18.6 % — HIGH (ref 10.3–14.5)
RBC # FLD: 18.7 % — HIGH (ref 10.3–14.5)
RBC # FLD: 19.2 % — HIGH (ref 10.3–14.5)
RBC # FLD: 19.2 % — HIGH (ref 10.3–14.5)
RBC # FLD: 19.5 % — HIGH (ref 10.3–14.5)
RBC # FLD: 19.9 % — HIGH (ref 10.3–14.5)
RBC # FLD: 19.9 % — HIGH (ref 10.3–14.5)
RBC # FLD: 20.1 % — HIGH (ref 10.3–14.5)
RBC # FLD: 20.6 % — HIGH (ref 10.3–14.5)
RBC # FLD: 21.2 % — HIGH (ref 10.3–14.5)
RBC # FLD: 21.6 % — HIGH (ref 10.3–14.5)
RBC # FLD: 21.8 % — HIGH (ref 10.3–14.5)
RBC # FLD: 22.2 % — HIGH (ref 10.3–14.5)
RBC # FLD: 22.3 % — HIGH (ref 10.3–14.5)
RBC BLD AUTO: ABNORMAL
RBC BLD AUTO: SIGNIFICANT CHANGE UP
RETICS #: 5.7 K/UL — LOW (ref 25–125)
RETICS/RBC NFR: 0.3 % — LOW (ref 0.5–2.5)
RH IG SCN BLD-IMP: POSITIVE — SIGNIFICANT CHANGE UP
RH IG SCN BLD-IMP: POSITIVE — SIGNIFICANT CHANGE UP
S AUREUS DNA NOSE QL NAA+PROBE: DETECTED
SAO2 % BLDMV: 28.4 — LOW (ref 60–90)
SAO2 % BLDMV: 34.1 — LOW (ref 60–90)
SAO2 % BLDMV: 41.2 — LOW (ref 60–90)
SAO2 % BLDMV: 41.7 — LOW (ref 60–90)
SAO2 % BLDMV: 62.7 — SIGNIFICANT CHANGE UP (ref 60–90)
SAO2 % BLDMV: 64.2 — SIGNIFICANT CHANGE UP (ref 60–90)
SARS-COV-2 IGG+IGM SERPL QL IA: 0.4 U/ML — SIGNIFICANT CHANGE UP
SARS-COV-2 IGG+IGM SERPL QL IA: 28.4 U/ML — HIGH
SARS-COV-2 IGG+IGM SERPL QL IA: NEGATIVE — SIGNIFICANT CHANGE UP
SARS-COV-2 IGG+IGM SERPL QL IA: POSITIVE
SARS-COV-2 N GENE NPH QL NAA+PROBE: NOT DETECTED
SARS-COV-2 N GENE NPH QL NAA+PROBE: NOT DETECTED
SARS-COV-2 RNA SPEC QL NAA+PROBE: SIGNIFICANT CHANGE UP
SARS-COV-2 RNA SPEC QL NAA+PROBE: SIGNIFICANT CHANGE UP
SCHISTOCYTES BLD QL AUTO: SLIGHT — SIGNIFICANT CHANGE UP
SMUDGE CELLS # BLD: PRESENT — SIGNIFICANT CHANGE UP
SMUDGE CELLS # BLD: PRESENT — SIGNIFICANT CHANGE UP
SODIUM SERPL-SCNC: 136 MMOL/L
SODIUM SERPL-SCNC: 136 MMOL/L — SIGNIFICANT CHANGE UP (ref 135–145)
SODIUM SERPL-SCNC: 137 MMOL/L
SODIUM SERPL-SCNC: 137 MMOL/L — SIGNIFICANT CHANGE UP (ref 135–145)
SODIUM SERPL-SCNC: 138 MMOL/L
SODIUM SERPL-SCNC: 138 MMOL/L — SIGNIFICANT CHANGE UP (ref 135–145)
SODIUM SERPL-SCNC: 138 MMOL/L — SIGNIFICANT CHANGE UP (ref 135–145)
SODIUM SERPL-SCNC: 139 MMOL/L
SODIUM SERPL-SCNC: 139 MMOL/L — SIGNIFICANT CHANGE UP (ref 135–145)
SODIUM SERPL-SCNC: 140 MMOL/L
SODIUM SERPL-SCNC: 140 MMOL/L — SIGNIFICANT CHANGE UP (ref 135–145)
SODIUM SERPL-SCNC: 142 MMOL/L
SP GR SPEC: 1.01 — SIGNIFICANT CHANGE UP (ref 1.01–1.02)
SP GR SPEC: 1.01 — SIGNIFICANT CHANGE UP (ref 1.01–1.02)
SPECIMEN SOURCE: SIGNIFICANT CHANGE UP
STOMATOCYTES BLD QL SMEAR: SIGNIFICANT CHANGE UP
TARGETS BLD QL SMEAR: SLIGHT — SIGNIFICANT CHANGE UP
TROPONIN T SERPL-MCNC: 0.02 NG/ML — SIGNIFICANT CHANGE UP (ref 0–0.06)
TROPONIN T SERPL-MCNC: 0.02 NG/ML — SIGNIFICANT CHANGE UP (ref 0–0.06)
UROBILINOGEN FLD QL: ABNORMAL
UROBILINOGEN FLD QL: NEGATIVE MG/DL — SIGNIFICANT CHANGE UP
VANCOMYCIN FLD-MCNC: 5.1 UG/ML — SIGNIFICANT CHANGE UP
WBC # BLD: 10.53 K/UL — HIGH (ref 3.8–10.5)
WBC # BLD: 13.21 K/UL — HIGH (ref 3.8–10.5)
WBC # BLD: 3.79 K/UL — LOW (ref 3.8–10.5)
WBC # BLD: 4.32 K/UL — SIGNIFICANT CHANGE UP (ref 3.8–10.5)
WBC # BLD: 4.49 K/UL — SIGNIFICANT CHANGE UP (ref 3.8–10.5)
WBC # BLD: 4.74 K/UL — SIGNIFICANT CHANGE UP (ref 3.8–10.5)
WBC # BLD: 4.93 K/UL — SIGNIFICANT CHANGE UP (ref 3.8–10.5)
WBC # BLD: 5.05 K/UL — SIGNIFICANT CHANGE UP (ref 3.8–10.5)
WBC # BLD: 5.11 K/UL — SIGNIFICANT CHANGE UP (ref 3.8–10.5)
WBC # BLD: 5.18 K/UL — SIGNIFICANT CHANGE UP (ref 3.8–10.5)
WBC # BLD: 5.31 K/UL — SIGNIFICANT CHANGE UP (ref 3.8–10.5)
WBC # BLD: 5.37 K/UL — SIGNIFICANT CHANGE UP (ref 3.8–10.5)
WBC # BLD: 5.42 K/UL — SIGNIFICANT CHANGE UP (ref 3.8–10.5)
WBC # BLD: 6.25 K/UL — SIGNIFICANT CHANGE UP (ref 3.8–10.5)
WBC # BLD: 6.48 K/UL — SIGNIFICANT CHANGE UP (ref 3.8–10.5)
WBC # BLD: 6.88 K/UL — SIGNIFICANT CHANGE UP (ref 3.8–10.5)
WBC # BLD: 6.95 K/UL — SIGNIFICANT CHANGE UP (ref 3.8–10.5)
WBC # BLD: 7.28 K/UL — SIGNIFICANT CHANGE UP (ref 3.8–10.5)
WBC # BLD: 7.5 K/UL — SIGNIFICANT CHANGE UP (ref 3.8–10.5)
WBC # BLD: 7.74 K/UL — SIGNIFICANT CHANGE UP (ref 3.8–10.5)
WBC # BLD: 8 K/UL — SIGNIFICANT CHANGE UP (ref 3.8–10.5)
WBC # BLD: 8.22 K/UL — SIGNIFICANT CHANGE UP (ref 3.8–10.5)
WBC # BLD: 8.97 K/UL — SIGNIFICANT CHANGE UP (ref 3.8–10.5)
WBC # BLD: 9.67 K/UL — SIGNIFICANT CHANGE UP (ref 3.8–10.5)
WBC # FLD AUTO: 10.53 K/UL — HIGH (ref 3.8–10.5)
WBC # FLD AUTO: 13.21 K/UL — HIGH (ref 3.8–10.5)
WBC # FLD AUTO: 3.79 K/UL — LOW (ref 3.8–10.5)
WBC # FLD AUTO: 4.32 K/UL — SIGNIFICANT CHANGE UP (ref 3.8–10.5)
WBC # FLD AUTO: 4.49 K/UL — SIGNIFICANT CHANGE UP (ref 3.8–10.5)
WBC # FLD AUTO: 4.74 K/UL — SIGNIFICANT CHANGE UP (ref 3.8–10.5)
WBC # FLD AUTO: 4.93 K/UL — SIGNIFICANT CHANGE UP (ref 3.8–10.5)
WBC # FLD AUTO: 5.05 K/UL — SIGNIFICANT CHANGE UP (ref 3.8–10.5)
WBC # FLD AUTO: 5.11 K/UL — SIGNIFICANT CHANGE UP (ref 3.8–10.5)
WBC # FLD AUTO: 5.18 K/UL — SIGNIFICANT CHANGE UP (ref 3.8–10.5)
WBC # FLD AUTO: 5.31 K/UL — SIGNIFICANT CHANGE UP (ref 3.8–10.5)
WBC # FLD AUTO: 5.37 K/UL — SIGNIFICANT CHANGE UP (ref 3.8–10.5)
WBC # FLD AUTO: 5.42 K/UL — SIGNIFICANT CHANGE UP (ref 3.8–10.5)
WBC # FLD AUTO: 6.25 K/UL — SIGNIFICANT CHANGE UP (ref 3.8–10.5)
WBC # FLD AUTO: 6.48 K/UL — SIGNIFICANT CHANGE UP (ref 3.8–10.5)
WBC # FLD AUTO: 6.88 K/UL — SIGNIFICANT CHANGE UP (ref 3.8–10.5)
WBC # FLD AUTO: 6.95 K/UL — SIGNIFICANT CHANGE UP (ref 3.8–10.5)
WBC # FLD AUTO: 7.28 K/UL — SIGNIFICANT CHANGE UP (ref 3.8–10.5)
WBC # FLD AUTO: 7.5 K/UL — SIGNIFICANT CHANGE UP (ref 3.8–10.5)
WBC # FLD AUTO: 7.74 K/UL — SIGNIFICANT CHANGE UP (ref 3.8–10.5)
WBC # FLD AUTO: 8 K/UL — SIGNIFICANT CHANGE UP (ref 3.8–10.5)
WBC # FLD AUTO: 8.22 K/UL — SIGNIFICANT CHANGE UP (ref 3.8–10.5)
WBC # FLD AUTO: 8.97 K/UL — SIGNIFICANT CHANGE UP (ref 3.8–10.5)
WBC # FLD AUTO: 9.67 K/UL — SIGNIFICANT CHANGE UP (ref 3.8–10.5)

## 2021-01-01 PROCEDURE — 93451 RIGHT HEART CATH: CPT | Mod: 26

## 2021-01-01 PROCEDURE — 80053 COMPREHEN METABOLIC PANEL: CPT

## 2021-01-01 PROCEDURE — 99223 1ST HOSP IP/OBS HIGH 75: CPT

## 2021-01-01 PROCEDURE — 99214 OFFICE O/P EST MOD 30 MIN: CPT

## 2021-01-01 PROCEDURE — 72148 MRI LUMBAR SPINE W/O DYE: CPT

## 2021-01-01 PROCEDURE — 99204 OFFICE O/P NEW MOD 45 MIN: CPT

## 2021-01-01 PROCEDURE — 83690 ASSAY OF LIPASE: CPT

## 2021-01-01 PROCEDURE — 86769 SARS-COV-2 COVID-19 ANTIBODY: CPT

## 2021-01-01 PROCEDURE — 99072 ADDL SUPL MATRL&STAF TM PHE: CPT

## 2021-01-01 PROCEDURE — 99291 CRITICAL CARE FIRST HOUR: CPT

## 2021-01-01 PROCEDURE — 71045 X-RAY EXAM CHEST 1 VIEW: CPT | Mod: 26,77

## 2021-01-01 PROCEDURE — 99213 OFFICE O/P EST LOW 20 MIN: CPT

## 2021-01-01 PROCEDURE — 99232 SBSQ HOSP IP/OBS MODERATE 35: CPT | Mod: 25

## 2021-01-01 PROCEDURE — 86850 RBC ANTIBODY SCREEN: CPT

## 2021-01-01 PROCEDURE — 72125 CT NECK SPINE W/O DYE: CPT | Mod: 26

## 2021-01-01 PROCEDURE — 84484 ASSAY OF TROPONIN QUANT: CPT

## 2021-01-01 PROCEDURE — 71045 X-RAY EXAM CHEST 1 VIEW: CPT | Mod: 26

## 2021-01-01 PROCEDURE — 72146 MRI CHEST SPINE W/O DYE: CPT

## 2021-01-01 PROCEDURE — 87040 BLOOD CULTURE FOR BACTERIA: CPT

## 2021-01-01 PROCEDURE — U0005: CPT

## 2021-01-01 PROCEDURE — 86901 BLOOD TYPING SEROLOGIC RH(D): CPT

## 2021-01-01 PROCEDURE — 99291 CRITICAL CARE FIRST HOUR: CPT | Mod: 25

## 2021-01-01 PROCEDURE — 80074 ACUTE HEPATITIS PANEL: CPT

## 2021-01-01 PROCEDURE — 99215 OFFICE O/P EST HI 40 MIN: CPT

## 2021-01-01 PROCEDURE — 93010 ELECTROCARDIOGRAM REPORT: CPT

## 2021-01-01 PROCEDURE — 86900 BLOOD TYPING SEROLOGIC ABO: CPT

## 2021-01-01 PROCEDURE — 71111 X-RAY EXAM RIBS/CHEST4/> VWS: CPT

## 2021-01-01 PROCEDURE — 99292 CRITICAL CARE ADDL 30 MIN: CPT | Mod: 25

## 2021-01-01 PROCEDURE — 36415 COLL VENOUS BLD VENIPUNCTURE: CPT

## 2021-01-01 PROCEDURE — 72146 MRI CHEST SPINE W/O DYE: CPT | Mod: 26

## 2021-01-01 PROCEDURE — 76705 ECHO EXAM OF ABDOMEN: CPT | Mod: 26,RT

## 2021-01-01 PROCEDURE — 83735 ASSAY OF MAGNESIUM: CPT

## 2021-01-01 PROCEDURE — 99222 1ST HOSP IP/OBS MODERATE 55: CPT | Mod: GC

## 2021-01-01 PROCEDURE — 71045 X-RAY EXAM CHEST 1 VIEW: CPT

## 2021-01-01 PROCEDURE — 71250 CT THORAX DX C-: CPT | Mod: 26

## 2021-01-01 PROCEDURE — 72141 MRI NECK SPINE W/O DYE: CPT

## 2021-01-01 PROCEDURE — 99232 SBSQ HOSP IP/OBS MODERATE 35: CPT

## 2021-01-01 PROCEDURE — 70450 CT HEAD/BRAIN W/O DYE: CPT | Mod: 26

## 2021-01-01 PROCEDURE — 85025 COMPLETE CBC W/AUTO DIFF WBC: CPT

## 2021-01-01 PROCEDURE — 99239 HOSP IP/OBS DSCHRG MGMT >30: CPT

## 2021-01-01 PROCEDURE — 99221 1ST HOSP IP/OBS SF/LOW 40: CPT

## 2021-01-01 PROCEDURE — 93306 TTE W/DOPPLER COMPLETE: CPT

## 2021-01-01 PROCEDURE — 71250 CT THORAX DX C-: CPT | Mod: MA

## 2021-01-01 PROCEDURE — 99233 SBSQ HOSP IP/OBS HIGH 50: CPT

## 2021-01-01 PROCEDURE — 93306 TTE W/DOPPLER COMPLETE: CPT | Mod: 26

## 2021-01-01 PROCEDURE — 72125 CT NECK SPINE W/O DYE: CPT

## 2021-01-01 PROCEDURE — 81003 URINALYSIS AUTO W/O SCOPE: CPT

## 2021-01-01 PROCEDURE — 83880 ASSAY OF NATRIURETIC PEPTIDE: CPT

## 2021-01-01 PROCEDURE — U0003: CPT

## 2021-01-01 PROCEDURE — 72148 MRI LUMBAR SPINE W/O DYE: CPT | Mod: 26

## 2021-01-01 PROCEDURE — 36620 INSERTION CATHETER ARTERY: CPT

## 2021-01-01 PROCEDURE — 71111 X-RAY EXAM RIBS/CHEST4/> VWS: CPT | Mod: 26

## 2021-01-01 PROCEDURE — 76705 ECHO EXAM OF ABDOMEN: CPT

## 2021-01-01 PROCEDURE — 72141 MRI NECK SPINE W/O DYE: CPT | Mod: 26

## 2021-01-01 PROCEDURE — 99205 OFFICE O/P NEW HI 60 MIN: CPT

## 2021-01-01 PROCEDURE — 93005 ELECTROCARDIOGRAM TRACING: CPT

## 2021-01-01 PROCEDURE — 70450 CT HEAD/BRAIN W/O DYE: CPT

## 2021-01-01 PROCEDURE — 86923 COMPATIBILITY TEST ELECTRIC: CPT

## 2021-01-01 RX ORDER — PIPERACILLIN AND TAZOBACTAM 4; .5 G/20ML; G/20ML
3.38 INJECTION, POWDER, LYOPHILIZED, FOR SOLUTION INTRAVENOUS ONCE
Refills: 0 | Status: COMPLETED | OUTPATIENT
Start: 2021-01-01 | End: 2021-01-01

## 2021-01-01 RX ORDER — FUROSEMIDE 40 MG
40 TABLET ORAL ONCE
Refills: 0 | Status: COMPLETED | OUTPATIENT
Start: 2021-01-01 | End: 2021-01-01

## 2021-01-01 RX ORDER — FAMOTIDINE 20 MG/1
20 TABLET, FILM COATED ORAL TWICE DAILY
Qty: 60 | Refills: 1 | Status: ACTIVE | COMMUNITY
Start: 2020-01-01 | End: 1900-01-01

## 2021-01-01 RX ORDER — SERTRALINE 25 MG/1
1 TABLET, FILM COATED ORAL
Qty: 0 | Refills: 0 | DISCHARGE

## 2021-01-01 RX ORDER — SERTRALINE 25 MG/1
50 TABLET, FILM COATED ORAL DAILY
Refills: 0 | Status: DISCONTINUED | OUTPATIENT
Start: 2021-01-01 | End: 2021-01-01

## 2021-01-01 RX ORDER — FUROSEMIDE 40 MG
40 TABLET ORAL
Refills: 0 | Status: DISCONTINUED | OUTPATIENT
Start: 2021-01-01 | End: 2021-01-01

## 2021-01-01 RX ORDER — DIPHENHYDRAMINE HCL 50 MG
50 CAPSULE ORAL ONCE
Refills: 0 | Status: COMPLETED | OUTPATIENT
Start: 2021-01-01 | End: 2021-01-01

## 2021-01-01 RX ORDER — FUROSEMIDE 40 MG
20 TABLET ORAL EVERY 4 HOURS
Refills: 0 | Status: COMPLETED | OUTPATIENT
Start: 2021-01-01 | End: 2021-01-01

## 2021-01-01 RX ORDER — CETIRIZINE HYDROCHLORIDE 10 MG/1
1 TABLET ORAL
Qty: 0 | Refills: 0 | DISCHARGE

## 2021-01-01 RX ORDER — AMCINONIDE 1 MG/G
0.1 CREAM TOPICAL TWICE DAILY
Qty: 1 | Refills: 0 | Status: DISCONTINUED | COMMUNITY
Start: 2021-01-01 | End: 2021-01-01

## 2021-01-01 RX ORDER — PIPERACILLIN AND TAZOBACTAM 4; .5 G/20ML; G/20ML
3.38 INJECTION, POWDER, LYOPHILIZED, FOR SOLUTION INTRAVENOUS EVERY 8 HOURS
Refills: 0 | Status: DISCONTINUED | OUTPATIENT
Start: 2021-01-01 | End: 2021-01-01

## 2021-01-01 RX ORDER — VANCOMYCIN HCL 1 G
1000 VIAL (EA) INTRAVENOUS ONCE
Refills: 0 | Status: COMPLETED | OUTPATIENT
Start: 2021-01-01 | End: 2021-01-01

## 2021-01-01 RX ORDER — MIDODRINE HYDROCHLORIDE 10 MG/1
10 TABLET ORAL 3 TIMES DAILY
Qty: 90 | Refills: 1 | Status: ACTIVE | COMMUNITY
Start: 2021-01-01

## 2021-01-01 RX ORDER — AZITHROMYCIN 500 MG/1
500 TABLET, FILM COATED ORAL ONCE
Refills: 0 | Status: DISCONTINUED | OUTPATIENT
Start: 2021-01-01 | End: 2021-01-01

## 2021-01-01 RX ORDER — ACYCLOVIR SODIUM 500 MG
400 VIAL (EA) INTRAVENOUS
Refills: 0 | Status: DISCONTINUED | OUTPATIENT
Start: 2021-01-01 | End: 2021-01-01

## 2021-01-01 RX ORDER — METOPROLOL TARTRATE 50 MG
25 TABLET ORAL DAILY
Refills: 0 | Status: DISCONTINUED | OUTPATIENT
Start: 2021-01-01 | End: 2021-01-01

## 2021-01-01 RX ORDER — FAMOTIDINE 10 MG/ML
1 INJECTION INTRAVENOUS
Qty: 0 | Refills: 0 | DISCHARGE

## 2021-01-01 RX ORDER — FUROSEMIDE 40 MG
40 TABLET ORAL ONCE
Refills: 0 | Status: DISCONTINUED | OUTPATIENT
Start: 2021-01-01 | End: 2021-01-01

## 2021-01-01 RX ORDER — SODIUM CHLORIDE 9 MG/ML
3 INJECTION INTRAMUSCULAR; INTRAVENOUS; SUBCUTANEOUS EVERY 8 HOURS
Refills: 0 | Status: DISCONTINUED | OUTPATIENT
Start: 2021-01-01 | End: 2021-01-01

## 2021-01-01 RX ORDER — POTASSIUM CHLORIDE 20 MEQ
40 PACKET (EA) ORAL ONCE
Refills: 0 | Status: COMPLETED | OUTPATIENT
Start: 2021-01-01 | End: 2021-01-01

## 2021-01-01 RX ORDER — TRIAMCINOLONE ACETONIDE 1 MG/G
0.1 OINTMENT TOPICAL
Qty: 1 | Refills: 1 | Status: ACTIVE | COMMUNITY
Start: 2021-01-01 | End: 1900-01-01

## 2021-01-01 RX ORDER — FOLIC ACID 0.8 MG
1 TABLET ORAL
Qty: 0 | Refills: 0 | DISCHARGE

## 2021-01-01 RX ORDER — FUROSEMIDE 40 MG
10 TABLET ORAL
Qty: 500 | Refills: 0 | Status: DISCONTINUED | OUTPATIENT
Start: 2021-01-01 | End: 2021-01-01

## 2021-01-01 RX ORDER — ONDANSETRON 8 MG/1
8 TABLET ORAL
Qty: 30 | Refills: 1 | Status: ACTIVE | COMMUNITY
Start: 2019-11-07 | End: 1900-01-01

## 2021-01-01 RX ORDER — MIDODRINE HYDROCHLORIDE 2.5 MG/1
10 TABLET ORAL THREE TIMES A DAY
Refills: 0 | Status: DISCONTINUED | OUTPATIENT
Start: 2021-01-01 | End: 2021-01-01

## 2021-01-01 RX ORDER — FOLIC ACID 0.8 MG
1 TABLET ORAL DAILY
Refills: 0 | Status: DISCONTINUED | OUTPATIENT
Start: 2021-01-01 | End: 2021-01-01

## 2021-01-01 RX ORDER — SODIUM CHLORIDE 9 MG/ML
250 INJECTION INTRAMUSCULAR; INTRAVENOUS; SUBCUTANEOUS ONCE
Refills: 0 | Status: DISCONTINUED | OUTPATIENT
Start: 2021-01-01 | End: 2021-01-01

## 2021-01-01 RX ORDER — DIPHENHYDRAMINE HCL 50 MG
25 CAPSULE ORAL EVERY 4 HOURS
Refills: 0 | Status: DISCONTINUED | OUTPATIENT
Start: 2021-01-01 | End: 2021-01-01

## 2021-01-01 RX ORDER — HYDROMORPHONE HYDROCHLORIDE 2 MG/ML
0.2 INJECTION INTRAMUSCULAR; INTRAVENOUS; SUBCUTANEOUS EVERY 4 HOURS
Refills: 0 | Status: DISCONTINUED | OUTPATIENT
Start: 2021-01-01 | End: 2021-01-01

## 2021-01-01 RX ORDER — VORICONAZOLE 200 MG/1
200 TABLET ORAL
Qty: 60 | Refills: 3 | Status: ACTIVE | COMMUNITY
Start: 2019-08-28 | End: 1900-01-01

## 2021-01-01 RX ORDER — MAGNESIUM SULFATE 500 MG/ML
2 VIAL (ML) INJECTION ONCE
Refills: 0 | Status: COMPLETED | OUTPATIENT
Start: 2021-01-01 | End: 2021-01-01

## 2021-01-01 RX ORDER — CEFEPIME 1 G/1
INJECTION, POWDER, FOR SOLUTION INTRAMUSCULAR; INTRAVENOUS
Refills: 0 | Status: DISCONTINUED | OUTPATIENT
Start: 2021-01-01 | End: 2021-01-01

## 2021-01-01 RX ORDER — DOBUTAMINE HCL 250MG/20ML
5 VIAL (ML) INTRAVENOUS
Qty: 500 | Refills: 0 | Status: DISCONTINUED | OUTPATIENT
Start: 2021-01-01 | End: 2021-01-01

## 2021-01-01 RX ORDER — CEFAZOLIN SODIUM 1 G
1000 VIAL (EA) INJECTION EVERY 12 HOURS
Refills: 0 | Status: DISCONTINUED | OUTPATIENT
Start: 2021-01-01 | End: 2021-01-01

## 2021-01-01 RX ORDER — ACYCLOVIR SODIUM 500 MG
1 VIAL (EA) INTRAVENOUS
Qty: 0 | Refills: 0 | DISCHARGE

## 2021-01-01 RX ORDER — CARVEDILOL 3.12 MG/1
3.12 TABLET, FILM COATED ORAL
Qty: 180 | Refills: 3 | Status: DISCONTINUED | COMMUNITY
Start: 2020-06-01 | End: 2021-01-01

## 2021-01-01 RX ORDER — HYDROMORPHONE HYDROCHLORIDE 2 MG/ML
0.2 INJECTION INTRAMUSCULAR; INTRAVENOUS; SUBCUTANEOUS
Refills: 0 | Status: DISCONTINUED | OUTPATIENT
Start: 2021-01-01 | End: 2021-01-01

## 2021-01-01 RX ORDER — ACYCLOVIR 400 MG/1
400 TABLET ORAL
Qty: 60 | Refills: 2 | Status: ACTIVE | COMMUNITY
Start: 2021-01-01 | End: 1900-01-01

## 2021-01-01 RX ORDER — LACTOBACILLUS ACIDOPHILUS 100MM CELL
1 CAPSULE ORAL
Refills: 0 | Status: DISCONTINUED | OUTPATIENT
Start: 2021-01-01 | End: 2021-01-01

## 2021-01-01 RX ORDER — PANTOPRAZOLE 40 MG/1
40 TABLET, DELAYED RELEASE ORAL
Qty: 30 | Refills: 3 | Status: ACTIVE | COMMUNITY
Start: 2019-11-07 | End: 1900-01-01

## 2021-01-01 RX ORDER — SODIUM CHLORIDE 9 MG/ML
1000 INJECTION INTRAMUSCULAR; INTRAVENOUS; SUBCUTANEOUS ONCE
Refills: 0 | Status: COMPLETED | OUTPATIENT
Start: 2021-01-01 | End: 2021-01-01

## 2021-01-01 RX ORDER — LIDOCAINE HCL 20 MG/ML
3 VIAL (ML) INJECTION ONCE
Refills: 0 | Status: DISCONTINUED | OUTPATIENT
Start: 2021-01-01 | End: 2021-01-01

## 2021-01-01 RX ORDER — CARVEDILOL PHOSPHATE 80 MG/1
3.12 CAPSULE, EXTENDED RELEASE ORAL EVERY 12 HOURS
Refills: 0 | Status: DISCONTINUED | OUTPATIENT
Start: 2021-01-01 | End: 2021-01-01

## 2021-01-01 RX ORDER — ACETAMINOPHEN 500 MG
650 TABLET ORAL ONCE
Refills: 0 | Status: COMPLETED | OUTPATIENT
Start: 2021-01-01 | End: 2021-01-01

## 2021-01-01 RX ORDER — CHLORHEXIDINE GLUCONATE 213 G/1000ML
1 SOLUTION TOPICAL DAILY
Refills: 0 | Status: DISCONTINUED | OUTPATIENT
Start: 2021-01-01 | End: 2021-01-01

## 2021-01-01 RX ORDER — MIDODRINE HYDROCHLORIDE 2.5 MG/1
1 TABLET ORAL
Qty: 0 | Refills: 0 | DISCHARGE
Start: 2021-01-01

## 2021-01-01 RX ORDER — SODIUM CHLORIDE 0.65 %
1 AEROSOL, SPRAY (ML) NASAL
Refills: 0 | Status: DISCONTINUED | OUTPATIENT
Start: 2021-01-01 | End: 2021-01-01

## 2021-01-01 RX ORDER — IPRATROPIUM/ALBUTEROL SULFATE 18-103MCG
3 AEROSOL WITH ADAPTER (GRAM) INHALATION EVERY 6 HOURS
Refills: 0 | Status: DISCONTINUED | OUTPATIENT
Start: 2021-01-01 | End: 2021-01-01

## 2021-01-01 RX ORDER — ACETAMINOPHEN 500 MG
650 TABLET ORAL EVERY 6 HOURS
Refills: 0 | Status: DISCONTINUED | OUTPATIENT
Start: 2021-01-01 | End: 2021-01-01

## 2021-01-01 RX ORDER — ALPRAZOLAM 0.5 MG/1
0.5 TABLET ORAL
Qty: 30 | Refills: 0 | Status: ACTIVE | COMMUNITY
Start: 2021-01-01 | End: 1900-01-01

## 2021-01-01 RX ORDER — POLYMYXIN B SULF/TRIMETHOPRIM 10000-1/ML
1 DROPS OPHTHALMIC (EYE)
Qty: 0 | Refills: 0 | DISCHARGE

## 2021-01-01 RX ORDER — SODIUM BICARBONATE 1 MEQ/ML
50 SYRINGE (ML) INTRAVENOUS
Refills: 0 | Status: COMPLETED | OUTPATIENT
Start: 2021-01-01 | End: 2021-01-01

## 2021-01-01 RX ORDER — MIDODRINE HYDROCHLORIDE 2.5 MG/1
5 TABLET ORAL THREE TIMES A DAY
Refills: 0 | Status: DISCONTINUED | OUTPATIENT
Start: 2021-01-01 | End: 2021-01-01

## 2021-01-01 RX ORDER — CEPHALEXIN 250 MG/1
250 TABLET ORAL EVERY 8 HOURS
Qty: 30 | Refills: 0 | Status: DISCONTINUED | COMMUNITY
Start: 2021-01-01 | End: 2021-01-01

## 2021-01-01 RX ORDER — ALPRAZOLAM 0.25 MG
0.5 TABLET ORAL AT BEDTIME
Refills: 0 | Status: DISCONTINUED | OUTPATIENT
Start: 2021-01-01 | End: 2021-01-01

## 2021-01-01 RX ORDER — ACETAMINOPHEN 500 MG
1000 TABLET ORAL ONCE
Refills: 0 | Status: COMPLETED | OUTPATIENT
Start: 2021-01-01 | End: 2021-01-01

## 2021-01-01 RX ORDER — HYDROMORPHONE HYDROCHLORIDE 2 MG/ML
1 INJECTION INTRAMUSCULAR; INTRAVENOUS; SUBCUTANEOUS
Refills: 0 | Status: DISCONTINUED | OUTPATIENT
Start: 2021-01-01 | End: 2021-01-01

## 2021-01-01 RX ORDER — FOLIC ACID 1 MG/1
1 TABLET ORAL
Qty: 30 | Refills: 3 | Status: ACTIVE | COMMUNITY
Start: 2019-11-07 | End: 1900-01-01

## 2021-01-01 RX ORDER — PANTOPRAZOLE SODIUM 20 MG/1
40 TABLET, DELAYED RELEASE ORAL
Refills: 0 | Status: DISCONTINUED | OUTPATIENT
Start: 2021-01-01 | End: 2021-01-01

## 2021-01-01 RX ORDER — VORICONAZOLE 10 MG/ML
200 INJECTION, POWDER, LYOPHILIZED, FOR SOLUTION INTRAVENOUS EVERY 12 HOURS
Refills: 0 | Status: DISCONTINUED | OUTPATIENT
Start: 2021-01-01 | End: 2021-01-01

## 2021-01-01 RX ORDER — TAMSULOSIN HYDROCHLORIDE 0.4 MG/1
0.4 CAPSULE ORAL
Qty: 30 | Refills: 3 | Status: ACTIVE | COMMUNITY
Start: 2019-08-28 | End: 1900-01-01

## 2021-01-01 RX ORDER — METOPROLOL TARTRATE 50 MG
1 TABLET ORAL
Qty: 0 | Refills: 0 | DISCHARGE

## 2021-01-01 RX ORDER — CEFPODOXIME PROXETIL 100 MG
1 TABLET ORAL
Qty: 4 | Refills: 0
Start: 2021-01-01 | End: 2021-01-01

## 2021-01-01 RX ORDER — CALCIUM CARBONATE 500(1250)
1 TABLET ORAL ONCE
Refills: 0 | Status: DISCONTINUED | OUTPATIENT
Start: 2021-01-01 | End: 2021-01-01

## 2021-01-01 RX ORDER — FUROSEMIDE 40 MG
20 TABLET ORAL ONCE
Refills: 0 | Status: COMPLETED | OUTPATIENT
Start: 2021-01-01 | End: 2021-01-01

## 2021-01-01 RX ORDER — MAGNESIUM OXIDE 400 MG ORAL TABLET 241.3 MG
400 TABLET ORAL
Refills: 0 | Status: DISCONTINUED | OUTPATIENT
Start: 2021-01-01 | End: 2021-01-01

## 2021-01-01 RX ORDER — SILVER SULFADIAZINE 10 MG/G
1 CREAM TOPICAL TWICE DAILY
Qty: 1 | Refills: 0 | Status: DISCONTINUED | COMMUNITY
Start: 2021-01-01 | End: 2021-01-01

## 2021-01-01 RX ORDER — SERTRALINE HYDROCHLORIDE 50 MG/1
50 TABLET, FILM COATED ORAL
Qty: 30 | Refills: 3 | Status: ACTIVE | COMMUNITY
Start: 2021-01-01 | End: 1900-01-01

## 2021-01-01 RX ORDER — FINASTERIDE 5 MG/1
5 TABLET, FILM COATED ORAL DAILY
Refills: 0 | Status: DISCONTINUED | OUTPATIENT
Start: 2021-01-01 | End: 2021-01-01

## 2021-01-01 RX ORDER — CEFEPIME 1 G/1
1000 INJECTION, POWDER, FOR SOLUTION INTRAMUSCULAR; INTRAVENOUS ONCE
Refills: 0 | Status: COMPLETED | OUTPATIENT
Start: 2021-01-01 | End: 2021-01-01

## 2021-01-01 RX ORDER — METOPROLOL SUCCINATE 25 MG/1
25 TABLET, EXTENDED RELEASE ORAL DAILY
Qty: 30 | Refills: 3 | Status: ACTIVE | COMMUNITY
Start: 2021-01-01 | End: 1900-01-01

## 2021-01-01 RX ORDER — FLUOCINONIDE 1 MG/G
0.1 CREAM TOPICAL TWICE DAILY
Qty: 3 | Refills: 2 | Status: DISCONTINUED | COMMUNITY
Start: 2021-01-01 | End: 2021-01-01

## 2021-01-01 RX ORDER — HYDROMORPHONE HYDROCHLORIDE 2 MG/ML
0.5 INJECTION INTRAMUSCULAR; INTRAVENOUS; SUBCUTANEOUS
Qty: 100 | Refills: 0 | Status: DISCONTINUED | OUTPATIENT
Start: 2021-01-01 | End: 2021-01-01

## 2021-01-01 RX ORDER — URSODIOL 300 MG/1
300 CAPSULE ORAL
Qty: 30 | Refills: 0 | Status: DISCONTINUED | COMMUNITY
Start: 2019-11-21 | End: 2021-01-01

## 2021-01-01 RX ORDER — CEFTRIAXONE 500 MG/1
1000 INJECTION, POWDER, FOR SOLUTION INTRAMUSCULAR; INTRAVENOUS ONCE
Refills: 0 | Status: COMPLETED | OUTPATIENT
Start: 2021-01-01 | End: 2021-01-01

## 2021-01-01 RX ORDER — SODIUM BICARBONATE 1 MEQ/ML
0.27 SYRINGE (ML) INTRAVENOUS
Qty: 150 | Refills: 0 | Status: DISCONTINUED | OUTPATIENT
Start: 2021-01-01 | End: 2021-01-01

## 2021-01-01 RX ORDER — NOREPINEPHRINE BITARTRATE/D5W 8 MG/250ML
0.05 PLASTIC BAG, INJECTION (ML) INTRAVENOUS
Qty: 8 | Refills: 0 | Status: DISCONTINUED | OUTPATIENT
Start: 2021-01-01 | End: 2021-01-01

## 2021-01-01 RX ORDER — FAMOTIDINE 10 MG/ML
20 INJECTION INTRAVENOUS
Refills: 0 | Status: DISCONTINUED | OUTPATIENT
Start: 2021-01-01 | End: 2021-01-01

## 2021-01-01 RX ORDER — MIDODRINE HYDROCHLORIDE 2.5 MG/1
5 TABLET ORAL ONCE
Refills: 0 | Status: COMPLETED | OUTPATIENT
Start: 2021-01-01 | End: 2021-01-01

## 2021-01-01 RX ORDER — VASOPRESSIN 20 [USP'U]/ML
0.04 INJECTION INTRAVENOUS
Qty: 50 | Refills: 0 | Status: DISCONTINUED | OUTPATIENT
Start: 2021-01-01 | End: 2021-01-01

## 2021-01-01 RX ORDER — INFLUENZA VIRUS VACCINE 15; 15; 15; 15 UG/.5ML; UG/.5ML; UG/.5ML; UG/.5ML
0.5 SUSPENSION INTRAMUSCULAR ONCE
Refills: 0 | Status: DISCONTINUED | OUTPATIENT
Start: 2021-01-01 | End: 2021-01-01

## 2021-01-01 RX ORDER — TAMSULOSIN HYDROCHLORIDE 0.4 MG/1
0.4 CAPSULE ORAL AT BEDTIME
Refills: 0 | Status: DISCONTINUED | OUTPATIENT
Start: 2021-01-01 | End: 2021-01-01

## 2021-01-01 RX ORDER — CEPHALEXIN 500 MG/1
500 CAPSULE ORAL
Qty: 14 | Refills: 0 | Status: DISCONTINUED | COMMUNITY
Start: 2021-01-01 | End: 2021-01-01

## 2021-01-01 RX ORDER — TORSEMIDE 20 MG/1
20 TABLET ORAL DAILY
Qty: 180 | Refills: 1 | Status: ACTIVE | COMMUNITY
Start: 1900-01-01 | End: 1900-01-01

## 2021-01-01 RX ORDER — FUROSEMIDE 40 MG
2.5 TABLET ORAL
Qty: 500 | Refills: 0 | Status: DISCONTINUED | OUTPATIENT
Start: 2021-01-01 | End: 2021-01-01

## 2021-01-01 RX ORDER — MUPIROCIN 20 MG/G
2 OINTMENT TOPICAL
Qty: 1 | Refills: 2 | Status: ACTIVE | COMMUNITY
Start: 2021-01-01 | End: 1900-01-01

## 2021-01-01 RX ORDER — CEFEPIME 1 G/1
1000 INJECTION, POWDER, FOR SOLUTION INTRAMUSCULAR; INTRAVENOUS EVERY 12 HOURS
Refills: 0 | Status: DISCONTINUED | OUTPATIENT
Start: 2021-01-01 | End: 2021-01-01

## 2021-01-01 RX ORDER — FLUTICASONE PROPIONATE 50 UG/1
50 SPRAY, METERED NASAL DAILY
Qty: 1 | Refills: 1 | Status: ACTIVE | COMMUNITY
Start: 2020-01-01 | End: 1900-01-01

## 2021-01-01 RX ORDER — FUROSEMIDE 20 MG/1
20 TABLET ORAL TWICE DAILY
Qty: 540 | Refills: 3 | Status: DISCONTINUED | COMMUNITY
Start: 2020-02-26 | End: 2021-01-01

## 2021-01-01 RX ADMIN — PIPERACILLIN AND TAZOBACTAM 25 GRAM(S): 4; .5 INJECTION, POWDER, LYOPHILIZED, FOR SOLUTION INTRAVENOUS at 21:10

## 2021-01-01 RX ADMIN — Medication 400 MILLIGRAM(S): at 00:00

## 2021-01-01 RX ADMIN — Medication 1 TABLET(S): at 18:30

## 2021-01-01 RX ADMIN — Medication 1 TABLET(S): at 09:29

## 2021-01-01 RX ADMIN — Medication 250 MILLIGRAM(S): at 11:22

## 2021-01-01 RX ADMIN — Medication 1000 MILLIGRAM(S): at 00:45

## 2021-01-01 RX ADMIN — Medication 1 TABLET(S): at 13:15

## 2021-01-01 RX ADMIN — Medication 400 MILLIGRAM(S): at 05:56

## 2021-01-01 RX ADMIN — SERTRALINE 50 MILLIGRAM(S): 25 TABLET, FILM COATED ORAL at 20:48

## 2021-01-01 RX ADMIN — Medication 0.5 MILLIGRAM(S): at 21:22

## 2021-01-01 RX ADMIN — TAMSULOSIN HYDROCHLORIDE 0.4 MILLIGRAM(S): 0.4 CAPSULE ORAL at 00:18

## 2021-01-01 RX ADMIN — VORICONAZOLE 200 MILLIGRAM(S): 10 INJECTION, POWDER, LYOPHILIZED, FOR SOLUTION INTRAVENOUS at 18:30

## 2021-01-01 RX ADMIN — MIDODRINE HYDROCHLORIDE 10 MILLIGRAM(S): 2.5 TABLET ORAL at 18:30

## 2021-01-01 RX ADMIN — CHLORHEXIDINE GLUCONATE 1 APPLICATION(S): 213 SOLUTION TOPICAL at 05:59

## 2021-01-01 RX ADMIN — Medication 1 TABLET(S): at 14:49

## 2021-01-01 RX ADMIN — Medication 12.3 MICROGRAM(S)/KG/MIN: at 08:49

## 2021-01-01 RX ADMIN — VORICONAZOLE 200 MILLIGRAM(S): 10 INJECTION, POWDER, LYOPHILIZED, FOR SOLUTION INTRAVENOUS at 16:27

## 2021-01-01 RX ADMIN — Medication 1 TABLET(S): at 10:47

## 2021-01-01 RX ADMIN — Medication 20 MILLIGRAM(S): at 06:30

## 2021-01-01 RX ADMIN — SERTRALINE 50 MILLIGRAM(S): 25 TABLET, FILM COATED ORAL at 08:42

## 2021-01-01 RX ADMIN — Medication 1 TABLET(S): at 13:04

## 2021-01-01 RX ADMIN — Medication 12.3 MICROGRAM(S)/KG/MIN: at 18:22

## 2021-01-01 RX ADMIN — Medication 650 MILLIGRAM(S): at 14:50

## 2021-01-01 RX ADMIN — Medication 400 MILLIGRAM(S): at 14:00

## 2021-01-01 RX ADMIN — Medication 1 MILLIGRAM(S): at 09:29

## 2021-01-01 RX ADMIN — Medication 50 GRAM(S): at 22:07

## 2021-01-01 RX ADMIN — Medication 40 MILLIGRAM(S): at 09:12

## 2021-01-01 RX ADMIN — VORICONAZOLE 200 MILLIGRAM(S): 10 INJECTION, POWDER, LYOPHILIZED, FOR SOLUTION INTRAVENOUS at 05:56

## 2021-01-01 RX ADMIN — Medication 400 MILLIGRAM(S): at 22:02

## 2021-01-01 RX ADMIN — HYDROMORPHONE HYDROCHLORIDE 0.5 MG/HR: 2 INJECTION INTRAMUSCULAR; INTRAVENOUS; SUBCUTANEOUS at 15:40

## 2021-01-01 RX ADMIN — Medication 50 MILLIEQUIVALENT(S): at 20:21

## 2021-01-01 RX ADMIN — Medication 400 MILLIGRAM(S): at 17:15

## 2021-01-01 RX ADMIN — Medication 0.25 MILLIGRAM(S): at 08:30

## 2021-01-01 RX ADMIN — Medication 400 MILLIGRAM(S): at 16:27

## 2021-01-01 RX ADMIN — PANTOPRAZOLE SODIUM 40 MILLIGRAM(S): 20 TABLET, DELAYED RELEASE ORAL at 05:50

## 2021-01-01 RX ADMIN — Medication 650 MILLIGRAM(S): at 10:30

## 2021-01-01 RX ADMIN — MAGNESIUM OXIDE 400 MG ORAL TABLET 400 MILLIGRAM(S): 241.3 TABLET ORAL at 08:42

## 2021-01-01 RX ADMIN — MIDODRINE HYDROCHLORIDE 10 MILLIGRAM(S): 2.5 TABLET ORAL at 05:50

## 2021-01-01 RX ADMIN — Medication 1 MILLIGRAM(S): at 15:39

## 2021-01-01 RX ADMIN — MAGNESIUM OXIDE 400 MG ORAL TABLET 400 MILLIGRAM(S): 241.3 TABLET ORAL at 13:15

## 2021-01-01 RX ADMIN — Medication 400 MILLIGRAM(S): at 05:27

## 2021-01-01 RX ADMIN — VASOPRESSIN 2.4 UNIT(S)/MIN: 20 INJECTION INTRAVENOUS at 08:50

## 2021-01-01 RX ADMIN — VASOPRESSIN 2.4 UNIT(S)/MIN: 20 INJECTION INTRAVENOUS at 08:28

## 2021-01-01 RX ADMIN — VORICONAZOLE 200 MILLIGRAM(S): 10 INJECTION, POWDER, LYOPHILIZED, FOR SOLUTION INTRAVENOUS at 17:15

## 2021-01-01 RX ADMIN — Medication 40 MILLIGRAM(S): at 17:15

## 2021-01-01 RX ADMIN — Medication 1 TABLET(S): at 16:28

## 2021-01-01 RX ADMIN — PANTOPRAZOLE SODIUM 40 MILLIGRAM(S): 20 TABLET, DELAYED RELEASE ORAL at 06:29

## 2021-01-01 RX ADMIN — FINASTERIDE 5 MILLIGRAM(S): 5 TABLET, FILM COATED ORAL at 13:15

## 2021-01-01 RX ADMIN — HYDROMORPHONE HYDROCHLORIDE 1 MILLIGRAM(S): 2 INJECTION INTRAMUSCULAR; INTRAVENOUS; SUBCUTANEOUS at 16:09

## 2021-01-01 RX ADMIN — VORICONAZOLE 200 MILLIGRAM(S): 10 INJECTION, POWDER, LYOPHILIZED, FOR SOLUTION INTRAVENOUS at 08:25

## 2021-01-01 RX ADMIN — Medication 12.3 MICROGRAM(S)/KG/MIN: at 02:25

## 2021-01-01 RX ADMIN — HYDROMORPHONE HYDROCHLORIDE 0.2 MILLIGRAM(S): 2 INJECTION INTRAMUSCULAR; INTRAVENOUS; SUBCUTANEOUS at 16:05

## 2021-01-01 RX ADMIN — PANTOPRAZOLE SODIUM 40 MILLIGRAM(S): 20 TABLET, DELAYED RELEASE ORAL at 05:27

## 2021-01-01 RX ADMIN — Medication 12.3 MICROGRAM(S)/KG/MIN: at 08:30

## 2021-01-01 RX ADMIN — PIPERACILLIN AND TAZOBACTAM 25 GRAM(S): 4; .5 INJECTION, POWDER, LYOPHILIZED, FOR SOLUTION INTRAVENOUS at 21:25

## 2021-01-01 RX ADMIN — Medication 12.3 MICROGRAM(S)/KG/MIN: at 19:58

## 2021-01-01 RX ADMIN — TAMSULOSIN HYDROCHLORIDE 0.4 MILLIGRAM(S): 0.4 CAPSULE ORAL at 22:02

## 2021-01-01 RX ADMIN — Medication 25 MILLIGRAM(S): at 09:05

## 2021-01-01 RX ADMIN — FINASTERIDE 5 MILLIGRAM(S): 5 TABLET, FILM COATED ORAL at 14:49

## 2021-01-01 RX ADMIN — MAGNESIUM OXIDE 400 MG ORAL TABLET 400 MILLIGRAM(S): 241.3 TABLET ORAL at 13:04

## 2021-01-01 RX ADMIN — Medication 1000 MILLIGRAM(S): at 15:00

## 2021-01-01 RX ADMIN — MAGNESIUM OXIDE 400 MG ORAL TABLET 400 MILLIGRAM(S): 241.3 TABLET ORAL at 16:28

## 2021-01-01 RX ADMIN — SODIUM CHLORIDE 3 MILLILITER(S): 9 INJECTION INTRAMUSCULAR; INTRAVENOUS; SUBCUTANEOUS at 06:39

## 2021-01-01 RX ADMIN — Medication 250 MILLIGRAM(S): at 14:47

## 2021-01-01 RX ADMIN — CEFEPIME 100 MILLIGRAM(S): 1 INJECTION, POWDER, FOR SOLUTION INTRAMUSCULAR; INTRAVENOUS at 05:09

## 2021-01-01 RX ADMIN — HYDROMORPHONE HYDROCHLORIDE 0.2 MILLIGRAM(S): 2 INJECTION INTRAMUSCULAR; INTRAVENOUS; SUBCUTANEOUS at 00:41

## 2021-01-01 RX ADMIN — SODIUM CHLORIDE 1000 MILLILITER(S): 9 INJECTION INTRAMUSCULAR; INTRAVENOUS; SUBCUTANEOUS at 14:21

## 2021-01-01 RX ADMIN — SODIUM CHLORIDE 1000 MILLILITER(S): 9 INJECTION INTRAMUSCULAR; INTRAVENOUS; SUBCUTANEOUS at 18:06

## 2021-01-01 RX ADMIN — VORICONAZOLE 200 MILLIGRAM(S): 10 INJECTION, POWDER, LYOPHILIZED, FOR SOLUTION INTRAVENOUS at 05:50

## 2021-01-01 RX ADMIN — Medication 400 MILLIGRAM(S): at 18:32

## 2021-01-01 RX ADMIN — MIDODRINE HYDROCHLORIDE 10 MILLIGRAM(S): 2.5 TABLET ORAL at 16:27

## 2021-01-01 RX ADMIN — SERTRALINE 50 MILLIGRAM(S): 25 TABLET, FILM COATED ORAL at 15:59

## 2021-01-01 RX ADMIN — Medication 400 MILLIGRAM(S): at 18:46

## 2021-01-01 RX ADMIN — Medication 0.5 MILLIGRAM(S): at 21:12

## 2021-01-01 RX ADMIN — MIDODRINE HYDROCHLORIDE 5 MILLIGRAM(S): 2.5 TABLET ORAL at 09:29

## 2021-01-01 RX ADMIN — Medication 50 MILLIEQUIVALENT(S): at 20:58

## 2021-01-01 RX ADMIN — Medication 0.25 MILLIGRAM(S): at 01:00

## 2021-01-01 RX ADMIN — PIPERACILLIN AND TAZOBACTAM 25 GRAM(S): 4; .5 INJECTION, POWDER, LYOPHILIZED, FOR SOLUTION INTRAVENOUS at 05:51

## 2021-01-01 RX ADMIN — Medication 7.69 MICROGRAM(S)/KG/MIN: at 08:28

## 2021-01-01 RX ADMIN — FINASTERIDE 5 MILLIGRAM(S): 5 TABLET, FILM COATED ORAL at 09:29

## 2021-01-01 RX ADMIN — VORICONAZOLE 200 MILLIGRAM(S): 10 INJECTION, POWDER, LYOPHILIZED, FOR SOLUTION INTRAVENOUS at 20:48

## 2021-01-01 RX ADMIN — Medication 1.25 MG/HR: at 07:43

## 2021-01-01 RX ADMIN — Medication 650 MILLIGRAM(S): at 09:05

## 2021-01-01 RX ADMIN — MIDODRINE HYDROCHLORIDE 10 MILLIGRAM(S): 2.5 TABLET ORAL at 13:15

## 2021-01-01 RX ADMIN — MAGNESIUM OXIDE 400 MG ORAL TABLET 400 MILLIGRAM(S): 241.3 TABLET ORAL at 11:55

## 2021-01-01 RX ADMIN — TAMSULOSIN HYDROCHLORIDE 0.4 MILLIGRAM(S): 0.4 CAPSULE ORAL at 21:12

## 2021-01-01 RX ADMIN — Medication 400 MILLIGRAM(S): at 05:50

## 2021-01-01 RX ADMIN — Medication 400 MILLIGRAM(S): at 05:07

## 2021-01-01 RX ADMIN — Medication 20 MILLIGRAM(S): at 02:15

## 2021-01-01 RX ADMIN — CEFEPIME 100 MILLIGRAM(S): 1 INJECTION, POWDER, FOR SOLUTION INTRAMUSCULAR; INTRAVENOUS at 17:19

## 2021-01-01 RX ADMIN — Medication 400 MILLIGRAM(S): at 05:55

## 2021-01-01 RX ADMIN — Medication 25 MILLIGRAM(S): at 09:06

## 2021-01-01 RX ADMIN — Medication 1 TABLET(S): at 08:42

## 2021-01-01 RX ADMIN — PIPERACILLIN AND TAZOBACTAM 200 GRAM(S): 4; .5 INJECTION, POWDER, LYOPHILIZED, FOR SOLUTION INTRAVENOUS at 00:19

## 2021-01-01 RX ADMIN — Medication 0.25 MILLIGRAM(S): at 02:45

## 2021-01-01 RX ADMIN — MIDODRINE HYDROCHLORIDE 10 MILLIGRAM(S): 2.5 TABLET ORAL at 18:45

## 2021-01-01 RX ADMIN — CHLORHEXIDINE GLUCONATE 1 APPLICATION(S): 213 SOLUTION TOPICAL at 09:48

## 2021-01-01 RX ADMIN — PIPERACILLIN AND TAZOBACTAM 25 GRAM(S): 4; .5 INJECTION, POWDER, LYOPHILIZED, FOR SOLUTION INTRAVENOUS at 14:00

## 2021-01-01 RX ADMIN — Medication 1 TABLET(S): at 09:05

## 2021-01-01 RX ADMIN — Medication 1 TABLET(S): at 13:16

## 2021-01-01 RX ADMIN — VORICONAZOLE 200 MILLIGRAM(S): 10 INJECTION, POWDER, LYOPHILIZED, FOR SOLUTION INTRAVENOUS at 19:56

## 2021-01-01 RX ADMIN — PANTOPRAZOLE SODIUM 40 MILLIGRAM(S): 20 TABLET, DELAYED RELEASE ORAL at 06:01

## 2021-01-01 RX ADMIN — HYDROMORPHONE HYDROCHLORIDE 1 MILLIGRAM(S): 2 INJECTION INTRAMUSCULAR; INTRAVENOUS; SUBCUTANEOUS at 15:39

## 2021-01-01 RX ADMIN — Medication 1 TABLET(S): at 08:25

## 2021-01-01 RX ADMIN — VORICONAZOLE 200 MILLIGRAM(S): 10 INJECTION, POWDER, LYOPHILIZED, FOR SOLUTION INTRAVENOUS at 05:55

## 2021-01-01 RX ADMIN — VASOPRESSIN 2.4 UNIT(S)/MIN: 20 INJECTION INTRAVENOUS at 02:25

## 2021-01-01 RX ADMIN — MIDODRINE HYDROCHLORIDE 10 MILLIGRAM(S): 2.5 TABLET ORAL at 05:07

## 2021-01-01 RX ADMIN — HYDROMORPHONE HYDROCHLORIDE 0.2 MILLIGRAM(S): 2 INJECTION INTRAMUSCULAR; INTRAVENOUS; SUBCUTANEOUS at 00:26

## 2021-01-01 RX ADMIN — Medication 1 TABLET(S): at 11:55

## 2021-01-01 RX ADMIN — PIPERACILLIN AND TAZOBACTAM 25 GRAM(S): 4; .5 INJECTION, POWDER, LYOPHILIZED, FOR SOLUTION INTRAVENOUS at 15:59

## 2021-01-01 RX ADMIN — PIPERACILLIN AND TAZOBACTAM 25 GRAM(S): 4; .5 INJECTION, POWDER, LYOPHILIZED, FOR SOLUTION INTRAVENOUS at 05:56

## 2021-01-01 RX ADMIN — SERTRALINE 50 MILLIGRAM(S): 25 TABLET, FILM COATED ORAL at 08:25

## 2021-01-01 RX ADMIN — SERTRALINE 50 MILLIGRAM(S): 25 TABLET, FILM COATED ORAL at 21:53

## 2021-01-01 RX ADMIN — Medication 1.25 MG/HR: at 13:43

## 2021-01-01 RX ADMIN — Medication 1 MILLIGRAM(S): at 08:25

## 2021-01-01 RX ADMIN — Medication 0.25 MILLIGRAM(S): at 13:48

## 2021-01-01 RX ADMIN — PANTOPRAZOLE SODIUM 40 MILLIGRAM(S): 20 TABLET, DELAYED RELEASE ORAL at 05:58

## 2021-01-01 RX ADMIN — Medication 1 MILLIGRAM(S): at 13:16

## 2021-01-01 RX ADMIN — SODIUM CHLORIDE 3 MILLILITER(S): 9 INJECTION INTRAMUSCULAR; INTRAVENOUS; SUBCUTANEOUS at 14:57

## 2021-01-01 RX ADMIN — PIPERACILLIN AND TAZOBACTAM 25 GRAM(S): 4; .5 INJECTION, POWDER, LYOPHILIZED, FOR SOLUTION INTRAVENOUS at 05:07

## 2021-01-01 RX ADMIN — Medication 12.3 MICROGRAM(S)/KG/MIN: at 07:43

## 2021-01-01 RX ADMIN — HYDROMORPHONE HYDROCHLORIDE 0.2 MILLIGRAM(S): 2 INJECTION INTRAMUSCULAR; INTRAVENOUS; SUBCUTANEOUS at 13:34

## 2021-01-01 RX ADMIN — PIPERACILLIN AND TAZOBACTAM 25 GRAM(S): 4; .5 INJECTION, POWDER, LYOPHILIZED, FOR SOLUTION INTRAVENOUS at 13:04

## 2021-01-01 RX ADMIN — CARVEDILOL PHOSPHATE 3.12 MILLIGRAM(S): 80 CAPSULE, EXTENDED RELEASE ORAL at 18:30

## 2021-01-01 RX ADMIN — MIDODRINE HYDROCHLORIDE 10 MILLIGRAM(S): 2.5 TABLET ORAL at 13:04

## 2021-01-01 RX ADMIN — Medication 50 MILLIGRAM(S): at 14:49

## 2021-01-01 RX ADMIN — VASOPRESSIN 2.4 UNIT(S)/MIN: 20 INJECTION INTRAVENOUS at 05:27

## 2021-01-01 RX ADMIN — Medication 40 MILLIGRAM(S): at 08:20

## 2021-01-01 RX ADMIN — SERTRALINE 50 MILLIGRAM(S): 25 TABLET, FILM COATED ORAL at 21:38

## 2021-01-01 RX ADMIN — FINASTERIDE 5 MILLIGRAM(S): 5 TABLET, FILM COATED ORAL at 09:04

## 2021-01-01 RX ADMIN — SERTRALINE 50 MILLIGRAM(S): 25 TABLET, FILM COATED ORAL at 13:16

## 2021-01-01 RX ADMIN — Medication 1 TABLET(S): at 18:46

## 2021-01-01 RX ADMIN — Medication 40 MILLIEQUIVALENT(S): at 15:59

## 2021-01-01 RX ADMIN — Medication 1 MILLIGRAM(S): at 08:42

## 2021-01-01 RX ADMIN — CEFEPIME 100 MILLIGRAM(S): 1 INJECTION, POWDER, FOR SOLUTION INTRAMUSCULAR; INTRAVENOUS at 06:03

## 2021-01-01 RX ADMIN — CEFEPIME 100 MILLIGRAM(S): 1 INJECTION, POWDER, FOR SOLUTION INTRAMUSCULAR; INTRAVENOUS at 14:11

## 2021-01-01 RX ADMIN — MIDODRINE HYDROCHLORIDE 10 MILLIGRAM(S): 2.5 TABLET ORAL at 05:56

## 2021-01-01 RX ADMIN — VORICONAZOLE 200 MILLIGRAM(S): 10 INJECTION, POWDER, LYOPHILIZED, FOR SOLUTION INTRAVENOUS at 18:32

## 2021-01-01 RX ADMIN — Medication 20 MILLIGRAM(S): at 20:48

## 2021-01-01 RX ADMIN — PIPERACILLIN AND TAZOBACTAM 25 GRAM(S): 4; .5 INJECTION, POWDER, LYOPHILIZED, FOR SOLUTION INTRAVENOUS at 21:22

## 2021-01-01 RX ADMIN — PIPERACILLIN AND TAZOBACTAM 25 GRAM(S): 4; .5 INJECTION, POWDER, LYOPHILIZED, FOR SOLUTION INTRAVENOUS at 13:41

## 2021-01-01 RX ADMIN — Medication 400 MILLIGRAM(S): at 06:29

## 2021-01-01 RX ADMIN — TAMSULOSIN HYDROCHLORIDE 0.4 MILLIGRAM(S): 0.4 CAPSULE ORAL at 21:24

## 2021-01-01 RX ADMIN — MIDODRINE HYDROCHLORIDE 5 MILLIGRAM(S): 2.5 TABLET ORAL at 00:18

## 2021-01-01 RX ADMIN — MAGNESIUM OXIDE 400 MG ORAL TABLET 400 MILLIGRAM(S): 241.3 TABLET ORAL at 18:46

## 2021-01-01 RX ADMIN — Medication 25 MILLIGRAM(S): at 19:22

## 2021-01-01 RX ADMIN — Medication 30 MILLILITER(S): at 00:24

## 2021-01-01 RX ADMIN — Medication 400 MILLIGRAM(S): at 18:30

## 2021-01-01 RX ADMIN — Medication 0.5 MILLIGRAM(S): at 00:18

## 2021-01-01 RX ADMIN — FINASTERIDE 5 MILLIGRAM(S): 5 TABLET, FILM COATED ORAL at 08:42

## 2021-01-01 RX ADMIN — FINASTERIDE 5 MILLIGRAM(S): 5 TABLET, FILM COATED ORAL at 08:25

## 2021-01-01 RX ADMIN — CARVEDILOL PHOSPHATE 3.12 MILLIGRAM(S): 80 CAPSULE, EXTENDED RELEASE ORAL at 18:46

## 2021-01-01 RX ADMIN — Medication 100 MILLIGRAM(S): at 17:07

## 2021-01-01 RX ADMIN — TAMSULOSIN HYDROCHLORIDE 0.4 MILLIGRAM(S): 0.4 CAPSULE ORAL at 21:38

## 2021-01-01 RX ADMIN — Medication 7.69 MICROGRAM(S)/KG/MIN: at 05:27

## 2021-01-01 RX ADMIN — Medication 7.69 MICROGRAM(S)/KG/MIN: at 07:44

## 2021-01-01 RX ADMIN — SODIUM CHLORIDE 3 MILLILITER(S): 9 INJECTION INTRAMUSCULAR; INTRAVENOUS; SUBCUTANEOUS at 23:00

## 2021-01-01 RX ADMIN — MIDODRINE HYDROCHLORIDE 10 MILLIGRAM(S): 2.5 TABLET ORAL at 11:55

## 2021-01-01 RX ADMIN — Medication 0.25 MILLIGRAM(S): at 19:40

## 2021-01-01 RX ADMIN — MAGNESIUM OXIDE 400 MG ORAL TABLET 400 MILLIGRAM(S): 241.3 TABLET ORAL at 10:46

## 2021-01-01 RX ADMIN — Medication 1 TABLET(S): at 15:59

## 2021-01-01 RX ADMIN — Medication 5 MG/HR: at 19:59

## 2021-01-01 RX ADMIN — Medication 7.69 MICROGRAM(S)/KG/MIN: at 00:27

## 2021-01-01 RX ADMIN — Medication 1 MILLIGRAM(S): at 09:05

## 2021-01-01 RX ADMIN — Medication 1.25 MG/HR: at 08:28

## 2021-01-01 RX ADMIN — Medication 40 MILLIEQUIVALENT(S): at 11:55

## 2021-01-01 RX ADMIN — Medication 7.69 MICROGRAM(S)/KG/MIN: at 08:49

## 2021-01-01 RX ADMIN — Medication 7.69 MICROGRAM(S)/KG/MIN: at 02:25

## 2021-01-01 RX ADMIN — SODIUM CHLORIDE 3 MILLILITER(S): 9 INJECTION INTRAMUSCULAR; INTRAVENOUS; SUBCUTANEOUS at 23:55

## 2021-01-01 RX ADMIN — Medication 20 MILLIGRAM(S): at 05:50

## 2021-01-01 RX ADMIN — Medication 100 MILLIGRAM(S): at 06:09

## 2021-01-01 RX ADMIN — MIDODRINE HYDROCHLORIDE 5 MILLIGRAM(S): 2.5 TABLET ORAL at 05:55

## 2021-01-01 RX ADMIN — TAMSULOSIN HYDROCHLORIDE 0.4 MILLIGRAM(S): 0.4 CAPSULE ORAL at 21:53

## 2021-01-01 RX ADMIN — Medication 25 MILLIGRAM(S): at 15:02

## 2021-01-01 RX ADMIN — Medication 12.3 MICROGRAM(S)/KG/MIN: at 08:28

## 2021-01-01 RX ADMIN — MIDODRINE HYDROCHLORIDE 10 MILLIGRAM(S): 2.5 TABLET ORAL at 15:59

## 2021-01-01 RX ADMIN — Medication 0.25 MILLIGRAM(S): at 13:36

## 2021-01-01 RX ADMIN — Medication 1.25 MG/HR: at 08:50

## 2021-01-01 RX ADMIN — VORICONAZOLE 200 MILLIGRAM(S): 10 INJECTION, POWDER, LYOPHILIZED, FOR SOLUTION INTRAVENOUS at 06:29

## 2021-01-01 RX ADMIN — Medication 0.5 MILLIGRAM(S): at 21:24

## 2021-01-01 RX ADMIN — Medication 40 MILLIEQUIVALENT(S): at 22:07

## 2021-01-01 RX ADMIN — Medication 1 MILLIGRAM(S): at 14:49

## 2021-01-01 RX ADMIN — TAMSULOSIN HYDROCHLORIDE 0.4 MILLIGRAM(S): 0.4 CAPSULE ORAL at 21:22

## 2021-01-01 RX ADMIN — VORICONAZOLE 200 MILLIGRAM(S): 10 INJECTION, POWDER, LYOPHILIZED, FOR SOLUTION INTRAVENOUS at 05:27

## 2021-01-01 RX ADMIN — VASOPRESSIN 2.4 UNIT(S)/MIN: 20 INJECTION INTRAVENOUS at 07:44

## 2021-01-01 RX ADMIN — PANTOPRAZOLE SODIUM 40 MILLIGRAM(S): 20 TABLET, DELAYED RELEASE ORAL at 05:07

## 2021-01-01 RX ADMIN — Medication 50 MILLIEQUIVALENT(S): at 21:03

## 2021-01-01 RX ADMIN — Medication 650 MILLIGRAM(S): at 14:49

## 2021-01-03 NOTE — PHYSICAL EXAM
[Ambulatory and capable of all self care but unable to carry out any work activities] : Status 2- Ambulatory and capable of all self care but unable to carry out any work activities. Up and about more than 50% of waking hours [Normal] : affect appropriate [No active (erythematous_ GVHD rash)] : Skin: No active (erythematous_ GVHD rash) [< 2 mg/dl] : Liver: < 2 mg/dl [No or intermittent] : Upper GI: No or intermittent nausea, vomiting or anorexia [<500 ml/day or < 3 episodes/day] : Lower GI (stool output/day): <500 ml/day or <3 episodes/day [de-identified] : trace BLE edema [de-identified] : +BS; abdomen soft, non-distended, non-tender [de-identified] : Mild erythema of bilateral antecubital fossa [No] : No [FreeTextEntry1] : 10/9/19

## 2021-01-03 NOTE — REVIEW OF SYSTEMS
[Negative] : Allergic/Immunologic [Chest Pain] : no chest pain [Palpitations] : no palpitations [Leg Claudication] : no intermittent leg claudication [Lower Ext Edema] : lower extremity edema [FreeTextEntry5] : Mild edema of bilateral lower extremities [de-identified] : Mild erythema of bilateral antecubital fossa

## 2021-01-03 NOTE — HISTORY OF PRESENT ILLNESS

## 2021-01-03 NOTE — ASSESSMENT
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 with the following comorbidities being managed:\par \par 1) AML\par S/p haplo (son) PSCT on 10/9/19\par 6/5/20 chimerism = 74.3% donor\par 6/23/20 chimerism = 67.7% donor (CD33 = 30% and CD3 = 100% donor)\par 6/25/20 BMbx: VINCE, normal male karyotype \par 7/8/20 chimerism = 57.7% donor (CD3 = 99% and CD33 = 10% donor)\par 7/21/20 chimerism = 59.3% donor (CD3 = 98.3% and CD33 = 7.3% donor)\par 8/13/20 chimerism = 56.7 % donor\par 9/2020 chimerism = 53%\par 9/24/20 chimerism = 55% donor\par 10/8/20 chimerism = 53%\par 10/22/20 chimerism = 38.3 % donor (CD 3 = 99% and CD 33= 1% donor)\par 11/4/20 chimerism = 51.3 % donor (CD 3 = 99% and CD 33= 3% donor)\par 12/3/20 chimerism = 41% donor (CD3 = 99% and CD33 = 1% donor)\par 12/14/20 chimerism= 36.7 % donor ( CD 3= 99% and CD 33=0.3% donor)\par Current disease status: VINCE based on 6/25/20 BMbx and ongoing peripheral blood work.\par \par In setting of dropping chimerism, SQ Vidaza 32mg/m2 daily x 5 days every 28 days started 7/27/20 \par    Cycle 2 - 8/24-8/28 \par    Cycle 3 - 9/21-9/25\par    Cycle 4 - 10/19 - 10/23\par    Cycle 5 - 11/16 - 11/20\par    Cycle 6 - 12/14 -12/19\par    Cycle 7- 1/11/21- 1/15/21\par \par S/p DLI #1 on 8/13/20\par S/P DLI #2 on 10/8/20\par S/p DLI #3 on 12/3/20\par \par 2) Heme\par ABO compatible transplant: pt and donor are both A pos\par Ongoing anemia and thrombocytopenia, poor graft function with dropping CD33 chimerism \par    12/29/20:   WBC 7.17   Hgb 11.5   PLT 63  ANC 2.99\par Continue multivitamin and folic acid daily\par \par Hx of recurrent DVT/PE\par Factor V Leiden reportedly runs in the family \par 11/21/19 Doppler US of RUE negative for DVT\par Doppler US of BLEs on 7/2/20 negative for DVT\par Xarelto on hold while pancytopenic, to resume when PLT consistently >100K\par \par 3) ID\par Continue ppx:\par - Acyclovir 400 mg bid \par - Voriconazole 200 mg bid - hx of aspergillosis \par Mepron d/c'd 12/16/20 \par Post transplant crowd and dietary restrictions reviewed - in light of covid-19, CDC recs reviewed and strict restrictions reinforced \par \par CMV viremia\par CMV PCR peaked at 8,581 on 11/20/19\par Valcyte 450mg BID started 11/21/19. Decreased to QD as of 12/17/19. D/c'd 2/14/20 for ongoing thrombocytopenia \par 12/14/20 CMV PCR not detected \par 12/29/20 CMV PCR not detected\par Continue to monitor PCR at ever visit \par \par 4) GVHD\par Skin 0  Liver 0   GI 0 - overall grade 0\par No signs of chronic GVHD at this time\par Off MMF as of 11/22/19 \par Tacrolimus discontinued on 6/15/20\par Prednisone discontinued on 6/15/20\par Reviewed signs/symptoms of GVHD (i.e. rash, mucositis, nausea/vomiting, diarrhea)\par \par aGVHD of skin \par Maculopapular pruritic rash of face, neck, and upper chest (BSA 19%) noted 1/21/20 in setting of tapering prednisone (for FTT) - max skin stage 1, overall grade 1\par 1/21/20: rash on face, neck, and chest (BSA 19%); skin 1, overall grade 1; prednisone increased to 10mg daily\par 2/14/20: rash on neck, chest, and bilateral upper arms (BSA 18%); skin 1, overall grade 1: prednisone increased to 20mg daily\par 2/21/20: rash on neck and chest (10% BSA); skin 1, overall grade 1. Continue prednisone 20mg daily\par 3/12/20: rash on neck and chest (10% BSA); skin 1, overall grade 1. Continue prednisone 15mg daily  \par 3/19/20: rash on neck and chest (10% BSA); skin 1, overall grade 1. Continue prednisone 15 mg daily\par 4/2/20: rash on neck and chest (10% BSA); skin 1, overall grade 1. Continue prednisone 15 mg daily\par 4/9/20: rash resolved, prednisone decreased to 10mg daily\par 4/17/20: rash on neck and chest (10% BSA); skin 1, overall grade 1. Continue prednisone 10mg daily and reinforced compliance with triamcinolone cream bid \par 5/8/20: waxing and waning pruritic erythema of upper back, unsure if GVHD. Continue prednisone 10mg daily at this time\par 5/14/20: mild erythema of neck and upper back ( 5% BSA); skin 1, overall grade 1. Continue prednisone 10 mg daily\par 5/22/20: faint pruritic erythema of upper chest and upper back (9% BSA); skin 1, overall grade 1. Prednisone decreased 5mg daily for drop in chimerism \par 6/5/20: monitor very faint erythema of chest and upper back. Continue triamcinolone 0.1% cream bid PRN rash \par Continue to monitor closely \par 6/23/20: Mild erythema of upper back and bilateral upper extremities.Continue triamcinolone 0.1% cream bid PRN rash \par 7/2/20: pruritic erythema of upper back, chest, and bilateral upper arms (36% BSA). Defer starting oral immunosuppression at this time given drop in chimerism. Start Claritin daily and Pepcid bid. Will attempt to attain insurance auth for Lidex 0.1% cream bid, continue to monitor closely\par 7/8/20: Pruritic erythema of chest and bilateral upper arms (18% BSA). Patient will pay out of pocket today for lidex cream. To be applied BID. Continue claritin and pepcid. \par 7/21/20: no rash \par 7/30/20: erythematous rash of chest (9% BSA), continue Lidex cream bid \par 8/27/20: Erythematous rash of chest and back ( 9% BSA). Continue lidex cream BID\par 9/8/20: erythematous rash of BUEs (18% BSA), continue Lidex cream bid \par Continue PRN Lidex cream for rash flares\par \par 5) GI\par Continue ppx:\par - Protonix 40 mg daily\par - Ursodiol 300 mg bid\par PRN Zofran and Reglan for nausea/vomiting\par \par 6) Cardiac\par HTN \par Metoprolol succinate 25 mg daily - discontinued by cardiology\par Continue meds as below\par Soft BP though asymptomatic\par \par Cardiomyopathy\par ECHO completed on  6/24/20, ordered by cardiology\par ECHO completed on 11/5/20 - Left ventricular ejection fraction of 20%\par Continue meds as prescribed:\par - Carvedilol 6.25mg bid\par - Valsartan 40mg daily\par - Lasix 40mg bid\par Continue f/u with cardiology \par \par Shortness of breath\par STAT chest xray completed on 11/4/20 - heart enlarged with small bilateral pleural effusions\par Increased lasix to 80 mg BID on 11/4/20 for two days. Then decrease lasix to 40 mg BID.\par Echo scheduled for 11/5/20. Patient and patients girlfriend aware patient needs to see Dr Lyn. \par Patient is aware he cannot have any  ETOH . Dr Gaytan met with patient and explained patient cannot drink ETOH. Contributing to fluid overload. \par RESOLVED\par \par 7) Other\par BPH - continue finasteride 5mg daily and tamsulosin 0.4mg daily \par Anxiety/Depression - continue Zoloft 50mg daily\par Insomnia - continue PRN Xanax 0.5-1mg qhs\par Pruritus - continue Pepcid 20mg bid and Zyrtec 10mg daily \par \par 8) Plan/Dispo\par Pt educated regarding plan of care, all questions/concerns addressed\par Continue f/u every  2 weeks\par F/u with Dr Gaytan on 1/13/21\par
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
## 2021-01-16 NOTE — HISTORY OF PRESENT ILLNESS
[de-identified] : Mr. Delong is a 60 y/o male, with a previously diagnosed acute myeloid leukemia. A bone marrow biopsy from 7/3 revealed Acute myeloid leukemia (AML). FISH - report shows a population of aberrant myeloid blasts. He is currently being treated by Dr. Coelho for high risk AML with HIDAC consolidation chemotherapy. He was referred by Dr. Coelho for an initial consultation of a Haplo- transplant. \par \par The patient has a history of multiple blood clots - factor V Leiden runs in the family. He was diagnosed with sleep apnea, but refuses to use CPAP. He is a former smoker, 40 years. He also has a past medical history of PE, HTN, cardiomyopathy. He recently underwent an amputation of the right first toe due to an infection.\par \par S/p hospitalization at [facility] 10/3/19 - 11/1/19 for haplo (son) SCT. \par Upon admission, a TLC was placed in IR. Mr. Delong received IV fluid hydration, pain management, nutritional support, anxiolysis, antiemetics, and antiviral,  antifungal, antibacterial, GI, PCP and VOD prophylaxis. When his ANC dropped  below 1000, he was started on prophylactic Cefepime. Labs were monitored on a daily basis and he received transfusional support and electrolyte repletion as needed. \par \par While in patient, Mr. Delong was continued on his Voriconazole for history of aspergillosis. \par \par S/p hospitalization at [facility] 10/3/19 - 11/1/19 for haplo (son) SCT. \par Upon admission, a TLC was placed in IR. Mr. Delong received IV fluid hydration, pain management, nutritional support, anxiolysis, antiemetics, and antiviral, antifungal, antibacterial, GI, PCP and VOD prophylaxis. When his ANC dropped below 1000, he was started on prophylactic Cefepime. Labs were monitored on a daily basis and he received transfusional support and electrolyte repletion as needed. \par \par While in patient, Mr. Delong was continued on his Voriconazole for history of aspergillosis. \par \par During admission, Mr. Delong had pancytopenia related to the high dose chemotherapy prep regimen. He also experienced neutropenic fevers. When he became febrile, blood and urine cultures were sent and a CXR was completed which were unremarkable. \par \par On 10/9/2019, following premedications, pt received 250 ml of allogeneic, related, haplo, fresh, mobilized HPC apheresis over 30-60 minutes. \par Cell counts as follows: \par \par Total MNCs (x10^8/kg) = 5.17 \par CD 34 cells (x10^6/kg) = 7.34 \par CD 3 Cells (x 10^7/kg) = 19.81 \par Cell viability (%) = 100 \par \par Pt tolerated infusion without any adverse reaction or complications. \par \par Engraftment was noted on 10/28/2019. Daily Zarxio was discontinued, as was Cefepime given negative cultures. Mr. Delong was discharged home to f/u at the [facility].  [de-identified] : On 12/16/20 visit, presents for Vidaza this week 12/14-12/19. Overall feeling OK. Ongoing fatigue. Stable SOB with exertion. Reports adequate PO intake and hydration. Drinking 1 glass of wine per week. Intermittent pruritic rash of upper back, intermittently using steroid cream. Trace BLE edema waxes and wanes, currently taking Lastix 40mg bid. Compliant with post transplant meds and restrictions. Denies fever, chills, headache, dizziness, blurred vision, mucositis/odynophagia, chest pain, cough, nausea/vomiting, diarrhea/constipation, abdominal pain, dysuria, bleeding, pain\par \par On 12/29/20, patient presents for a follow up visit. Overall Young is well and offers no acute concerns. Mild erythema of bilateral antecubital fossa. Denies fever, chills, nausea, vomiting, diarrhea, rash, mouth sores, dysuria or any signs of active bleeding. Denies SOB or chest pain. Mild edema of bilateral lower extremities. Remains compliant with medications as prescribed. \par \par On 1/13/21, day + 462 post stem cell transplant. Overall patient is well and offers no acute concerns. Patient is receiving cycle 7 of vidaza this week. Continues to have mild erythema of bilateral antecubital fossa but, admits does not apply hydrocortisone cream BID. Denies fever, chills, nausea, vomiting, diarrhea, rash, mouth sores, dysuria or any signs of active bleeding. Denies SOB or chest pain. Mild edema of bilateral lower extremities. Remains compliant with medications as prescribed.

## 2021-01-16 NOTE — PHYSICAL EXAM
[Ambulatory and capable of all self care but unable to carry out any work activities] : Status 2- Ambulatory and capable of all self care but unable to carry out any work activities. Up and about more than 50% of waking hours [Normal] : affect appropriate [No active (erythematous_ GVHD rash)] : Skin: No active (erythematous_ GVHD rash) [< 2 mg/dl] : Liver: < 2 mg/dl [No or intermittent] : Upper GI: No or intermittent nausea, vomiting or anorexia [<500 ml/day or < 3 episodes/day] : Lower GI (stool output/day): <500 ml/day or <3 episodes/day [No] : No [de-identified] : trace BLE edema [de-identified] : +BS; abdomen soft, non-distended, non-tender [de-identified] : Mild erythema of bilateral antecubital fossa [FreeTextEntry1] : 10/9/19

## 2021-01-16 NOTE — REVIEW OF SYSTEMS
[Lower Ext Edema] : lower extremity edema [Negative] : Allergic/Immunologic [Chest Pain] : no chest pain [Palpitations] : no palpitations [Leg Claudication] : no intermittent leg claudication [FreeTextEntry5] : Mild edema of bilateral lower extremities [de-identified] : Mild erythema of bilateral antecubital fossa

## 2021-01-16 NOTE — ASSESSMENT
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 with the following comorbidities being managed:\par \par 1) AML\par S/p haplo (son) PSCT on 10/9/19\par 6/5/20 chimerism = 74.3% donor\par 6/23/20 chimerism = 67.7% donor (CD33 = 30% and CD3 = 100% donor)\par 6/25/20 BMbx: VINCE, normal male karyotype \par 7/8/20 chimerism = 57.7% donor (CD3 = 99% and CD33 = 10% donor)\par 7/21/20 chimerism = 59.3% donor (CD3 = 98.3% and CD33 = 7.3% donor)\par 8/13/20 chimerism = 56.7 % donor\par 9/2020 chimerism = 53%\par 9/24/20 chimerism = 55% donor\par 10/8/20 chimerism = 53%\par 10/22/20 chimerism = 38.3 % donor (CD 3 = 99% and CD 33= 1% donor)\par 11/4/20 chimerism = 51.3 % donor (CD 3 = 99% and CD 33= 3% donor)\par 12/3/20 chimerism = 41% donor (CD3 = 99% and CD33 = 1% donor)\par 12/14/20 chimerism= 36.7 % donor ( CD 3= 99% and CD 33=0.3% donor)\par 12/29/20 chimerism= 36.3 % donor ( CD 3= 99% and CD 33= 0.3% donor)\par Current disease status: VINCE based on 6/25/20 BMbx and ongoing peripheral blood work.\par \par In setting of dropping chimerism, SQ Vidaza 32mg/m2 daily x 5 days every 28 days started 7/27/20 \par    Cycle 2 - 8/24-8/28 \par    Cycle 3 - 9/21-9/25\par    Cycle 4 - 10/19 - 10/23\par    Cycle 5 - 11/16 - 11/20\par    Cycle 6 - 12/14 -12/19\par    Cycle 7- 1/11/21- 1/15/21\par    Cycle 8- 2/8/21- 2/12/21\par \par S/p DLI #1 on 8/13/20\par S/P DLI #2 on 10/8/20\par S/p DLI #3 on 12/3/20\par \par 2) Heme\par ABO compatible transplant: pt and donor are both A pos\par Ongoing anemia and thrombocytopenia, poor graft function with dropping CD 33 chimerism \par    1/13/21:  WBC 9.67   Hgb 10.5  PLT 56   ANC 4.70 \par Continue multivitamin and folic acid daily\par \par Hx of recurrent DVT/PE\par Factor V Leiden reportedly runs in the family \par 11/21/19 Doppler US of RUE negative for DVT\par Doppler US of BLEs on 7/2/20 negative for DVT\par Xarelto on hold while pancytopenic, to resume when PLT consistently >100K\par \par 3) ID\par Continue ppx:\par - Acyclovir 400 mg bid \par - Voriconazole 200 mg bid - hx of aspergillosis \par Mepron d/c'd 12/16/20 \par Post transplant crowd and dietary restrictions reviewed - in light of covid-19, [facility] recs reviewed and strict restrictions reinforced \par \par CMV viremia\par CMV PCR peaked at 8,581 on 11/20/19\par Valcyte 450mg BID started 11/21/19. Decreased to QD as of 12/17/19. D/c'd 2/14/20 for ongoing thrombocytopenia \par 12/14/20 CMV PCR not detected \par 12/29/20 CMV PCR not detected\par Continue to monitor PCR at ever visit \par \par 4) GVHD\par Skin 0  Liver 0   GI 0 - overall grade 0\par No signs of chronic GVHD at this time\par Off MMF as of 11/22/19 \par Tacrolimus discontinued on 6/15/20\par Prednisone discontinued on 6/15/20\par Reviewed signs/symptoms of GVHD (i.e. rash, mucositis, nausea/vomiting, diarrhea)\par \par aGVHD of skin \par Maculopapular pruritic rash of face, neck, and upper chest (BSA 19%) noted 1/21/20 in setting of tapering prednisone (for FTT) - max skin stage 1, overall grade 1\par 1/21/20: rash on face, neck, and chest (BSA 19%); skin 1, overall grade 1; prednisone increased to 10mg daily\par 2/14/20: rash on neck, chest, and bilateral upper arms (BSA 18%); skin 1, overall grade 1: prednisone increased to 20mg daily\par 2/21/20: rash on neck and chest (10% BSA); skin 1, overall grade 1. Continue prednisone 20mg daily\par 3/12/20: rash on neck and chest (10% BSA); skin 1, overall grade 1. Continue prednisone 15mg daily  \par 3/19/20: rash on neck and chest (10% BSA); skin 1, overall grade 1. Continue prednisone 15 mg daily\par 4/2/20: rash on neck and chest (10% BSA); skin 1, overall grade 1. Continue prednisone 15 mg daily\par 4/9/20: rash resolved, prednisone decreased to 10mg daily\par 4/17/20: rash on neck and chest (10% BSA); skin 1, overall grade 1. Continue prednisone 10mg daily and reinforced compliance with triamcinolone cream bid \par 5/8/20: waxing and waning pruritic erythema of upper back, unsure if GVHD. Continue prednisone 10mg daily at this time\par 5/14/20: mild erythema of neck and upper back ( 5% BSA); skin 1, overall grade 1. Continue prednisone 10 mg daily\par 5/22/20: faint pruritic erythema of upper chest and upper back (9% BSA); skin 1, overall grade 1. Prednisone decreased 5mg daily for drop in chimerism \par 6/5/20: monitor very faint erythema of chest and upper back. Continue triamcinolone 0.1% cream bid PRN rash \par Continue to monitor closely \par 6/23/20: Mild erythema of upper back and bilateral upper extremities.Continue triamcinolone 0.1% cream bid PRN rash \par 7/2/20: pruritic erythema of upper back, chest, and bilateral upper arms (36% BSA). Defer starting oral immunosuppression at this time given drop in chimerism. Start Claritin daily and Pepcid bid. Will attempt to attain insurance auth for Lidex 0.1% cream bid, continue to monitor closely\par 7/8/20: Pruritic erythema of chest and bilateral upper arms (18% BSA). Patient will pay out of pocket today for lidex cream. To be applied BID. Continue claritin and pepcid. \par 7/21/20: no rash \par 7/30/20: erythematous rash of chest (9% BSA), continue Lidex cream bid \par 8/27/20: Erythematous rash of chest and back ( 9% BSA). Continue lidex cream BID\par 9/8/20: erythematous rash of BUEs (18% BSA), continue Lidex cream bid \par Continue PRN Lidex cream for rash flares\par \par 5) GI\par Continue ppx:\par - Protonix 40 mg daily\par - Ursodiol 300 mg bid\par PRN Zofran and Reglan for nausea/vomiting\par \par 6) Cardiac\par HTN \par Metoprolol succinate 25 mg daily - discontinued by cardiology\par Continue meds as below\par Soft BP though asymptomatic\par \par Cardiomyopathy\par ECHO completed on  6/24/20, ordered by cardiology\par ECHO completed on 11/5/20 - Left ventricular ejection fraction of 20%\par Continue meds as prescribed:\par - Carvedilol 6.25 mg bid\par - Valsartan 40 mg daily\par - Lasix 40 mg bid\par Continue f/u with cardiology \par \par Shortness of breath\par STAT chest xray completed on 11/4/20 - heart enlarged with small bilateral pleural effusions\par Increased lasix to 80 mg BID on 11/4/20 for two days. Then decrease lasix to 40 mg BID.\par Echo scheduled for 11/5/20. Patient and patients girlfriend aware patient needs to see Dr Lyn. \par Patient is aware he cannot have any  ETOH . Dr Gaytan met with patient and explained patient cannot drink ETOH. Contributing to fluid overload. \par RESOLVED\par \par 7) Other\par BPH - continue finasteride 5mg daily and tamsulosin 0.4 mg daily \par Anxiety/Depression - continue Zoloft 50 mg daily\par Insomnia - continue PRN Xanax 0.5-1 mg qhs\par Pruritus - continue Pepcid 20 mg bid and Zyrtec 10 mg daily \par Mild erythema of bilateral antecubital fossa- encouraged patient to apply hydrocortisone cream BID\par \par 8) Plan/Dispo\par Pt educated regarding plan of care, all questions/concerns addressed\par Continue f/u every  2 weeks\par F/u with Dr Gaytan on 1/27/21\par

## 2021-01-21 NOTE — DISCUSSION/SUMMARY
[FreeTextEntry1] : The patient continues to feel well. He has no shortness of breath and no peripheral edema. He is trying his best to reduce his intake of alcohol and salt. Does understand the importance of  lifestyle. He is not smoking. His weight has been stable.\par \par We discussed his medication and  recent blood work. Creatinine has gone up a little bit. He  will check it again the end of this month. Assuming that the blood work is stable he stay on his present medication. If he does have any symptoms or problems he will call. \par \par

## 2021-01-21 NOTE — PHYSICAL EXAM
[General Appearance - Well Developed] : well developed [Normal Appearance] : normal appearance [Well Groomed] : well groomed [General Appearance - Well Nourished] : well nourished [No Deformities] : no deformities [General Appearance - In No Acute Distress] : no acute distress [Normal Conjunctiva] : the conjunctiva exhibited no abnormalities [Eyelids - No Xanthelasma] : the eyelids demonstrated no xanthelasmas [Normal Oral Mucosa] : normal oral mucosa [No Oral Cyanosis] : no oral cyanosis [No Oral Pallor] : no oral pallor [Normal Jugular Venous A Waves Present] : normal jugular venous A waves present [Normal Jugular Venous V Waves Present] : normal jugular venous V waves present [No Jugular Venous Rosario A Waves] : no jugular venous rosario A waves [Respiration, Rhythm And Depth] : normal respiratory rhythm and effort [Exaggerated Use Of Accessory Muscles For Inspiration] : no accessory muscle use [Auscultation Breath Sounds / Voice Sounds] : lungs were clear to auscultation bilaterally [Abdomen Soft] : soft [Abdomen Tenderness] : non-tender [Abnormal Walk] : normal gait [Abdomen Mass (___ Cm)] : no abdominal mass palpated [Gait - Sufficient For Exercise Testing] : the gait was sufficient for exercise testing [] : no rash [Skin Color & Pigmentation] : normal skin color and pigmentation [No Venous Stasis] : no venous stasis [Skin Lesions] : no skin lesions [No Skin Ulcers] : no skin ulcer [No Xanthoma] : no  xanthoma was observed [Oriented To Time, Place, And Person] : oriented to person, place, and time [Affect] : the affect was normal [Mood] : the mood was normal [No Anxiety] : not feeling anxious [5th Left ICS - MCL] : palpated at the 5th LICS in the midclavicular line [Normal] : normal [No Precordial Heave] : no precordial heave was noted [Normal Rate] : normal [Heart Rate ___] : [unfilled] bpm [Normal S1] : normal S1 [Rhythm Regular] : regular [Normal S2] : normal S2 [No Gallop] : no gallop heard [No Murmur] : no murmurs heard [2+] : left 2+ [1+] : left 1+ [No Abnormalities] : the abdominal aorta was not enlarged and no bruit was heard [___ +] : bilateral [unfilled]U+ pretibial pitting edema [FreeTextEntry1] : s/p right big toe amputation [Apical Thrill] : no thrill palpable at the apex [S3] : no S3 [S4] : no S4 [Click] : no click [Pericardial Rub] : no pericardial rub [Right Carotid Bruit] : no bruit heard over the right carotid [Right Femoral Bruit] : no bruit heard over the right femoral artery [Rt] : no varicose veins of the right leg [Lt] : no varicose veins of the left leg

## 2021-01-21 NOTE — HISTORY OF PRESENT ILLNESS
[FreeTextEntry1] : Young presents for evaluation of cardiomyopathy. He has had a complicated medical history and is now s/p bone marrow transplant. He has been treated for AML including consolidation therapy in July of last year. He developed osteomyelitis of the right toe for which he underwent amputation. He has a known cardiomyopathy but had no cardiac complications throughout his hospitalization. He did have neutropenic fevers but no heart failure, dysrhythmias, or other cardiac problems. He is feeling well today and has no specific complaints.\par \par Since 7/19 LV systolic function has been deteriorating. LVEF from 40% to 20%. MR scan of the heart 9/19 demonstrated ejection fraction of 24%. Recently the patient has developed increasing water retention with weight gain, increasing shortness of breath. His Lasix was doubled and he has lost about 6 pounds of water weight. He is feeling better. By mistake  he took 160 mg of Lasix. He is also taking both carvedilol and metoprolol. Blood pressure will be a limiting factor for medical therapy.\par \par Past medical is remarkable for factor V Leiden mutation known in the family, history of DVT, pulmonary embolism, hypertension, tachycardia\par \par Social history is remarkable for smoking for approximately 45 years, stopped last year.  He has worked as a . The patient had been drinking alcohol which he stopped and unfortunately is having a high salt diet\par \par Family history is notable for both parents dying together in an accident. His father had successful bypass surgery at an older age\par \par I discussed his case with both electrophysiologist and the oncologist. Because of his present blood counts he is not considered an ideal candidate for any device or intervention. We are treating him medically.\par \par On the higher dose of carvedilol without the metoprolol he is more awake and less fatigued. He is having no shortness of breath. According to his girlfriend still having one alcohol drink a day and not behaving himself with low salt. Last visit I started valsartan 40 mg once a day. Is tolerating medicine well without side effects her blood pressures running in the low 90s but is not lightheaded. Breathing is good. He is cutting back on his alcohol.  He is trying to watch his salt diet\par \par Blood work 12/20 demonstrated creatinine 1.36, BUN. 17, potassium 3.8. He still is limited by shortness of breath. He has trace to 1+ edema. He has had about 3 glasses of wine over the past month. Repeat blood work 1/21 demonstrated a hemoglobin of 10.5 hematocrit 32.2. Creatinine 1.49 with BUN of 21

## 2021-01-27 PROBLEM — Z86.19 HISTORY OF ASPERGILLOSIS: Status: ACTIVE | Noted: 2019-11-08

## 2021-01-27 NOTE — ASSESSMENT
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 with the following comorbidities being managed:\par \par 1) AML\par S/p haplo (son) PSCT on 10/9/19\par 6/5/20 chimerism = 74.3% donor\par 6/23/20 chimerism = 67.7% donor (CD33 = 30% and CD3 = 100% donor)\par 6/25/20 BMbx: VINCE, normal male karyotype \par 7/8/20 chimerism = 57.7% donor (CD3 = 99% and CD33 = 10% donor)\par 7/21/20 chimerism = 59.3% donor (CD3 = 98.3% and CD33 = 7.3% donor)\par 8/13/20 chimerism = 56.7 % donor\par 9/2020 chimerism = 53%\par 9/24/20 chimerism = 55% donor\par 10/8/20 chimerism = 53%\par 10/22/20 chimerism = 38.3 % donor (CD 3 = 99% and CD 33= 1% donor)\par 11/4/20 chimerism = 51.3 % donor (CD 3 = 99% and CD 33= 3% donor)\par 12/3/20 chimerism = 41% donor (CD3 = 99% and CD33 = 1% donor)\par 12/14/20 chimerism= 36.7 % donor ( CD 3= 99% and CD 33=0.3% donor)\par 12/29/20 chimerism= 36.3 % donor ( CD 3= 99% and CD 33= 0.3% donor)\par 1/15/21 36%\par Current disease status: VINCE based on 6/25/20 BMbx and ongoing peripheral blood work.\par \par In setting of dropping chimerism, SQ Vidaza 32mg/m2 daily x 5 days every 28 days started 7/27/20 \par    Cycle 2 - 8/24-8/28 \par    Cycle 3 - 9/21-9/25\par    Cycle 4 - 10/19 - 10/23\par    Cycle 5 - 11/16 - 11/20\par    Cycle 6 - 12/14 -12/19\par    Cycle 7- 1/11/21- 1/15/21\par    Cycle 8- 2/8/21- 2/12/21\par \par S/p DLI #1 on 8/13/20\par S/P DLI #2 on 10/8/20\par S/p DLI #3 on 12/3/20\par \par 2) Heme\par ABO compatible transplant: pt and donor are both A pos\par Ongoing anemia and thrombocytopenia, poor graft function with dropping CD 33 chimerism \par    1/13/21:  WBC 9.67   Hgb 10.5  PLT 56   ANC 4.70 \par Continue multivitamin and folic acid daily\par \par Hx of recurrent DVT/PE\par Factor V Leiden reportedly runs in the family \par 11/21/19 Doppler US of RUE negative for DVT\par Doppler US of BLEs on 7/2/20 negative for DVT\par Xarelto on hold while pancytopenic, to resume when PLT consistently >100K\par \par 3) ID\par Continue ppx:\par - Acyclovir 400 mg bid \par - Voriconazole 200 mg bid - hx of aspergillosis \par Mepron d/c'd 12/16/20 \par Post transplant crowd and dietary restrictions reviewed - in light of covid-19, [facility] recs reviewed and strict restrictions reinforced \par \par CMV viremia\par CMV PCR peaked at 8,581 on 11/20/19\par Valcyte 450mg BID started 11/21/19. Decreased to QD as of 12/17/19. D/c'd 2/14/20 for ongoing thrombocytopenia \par 12/14/20 CMV PCR not detected \par 12/29/20 CMV PCR not detected\par Continue to monitor PCR at ever visit \par \par 4) GVHD\par Skin 0  Liver 0   GI 0 - overall grade 0\par No signs of chronic GVHD at this time\par Off MMF as of 11/22/19 \par Tacrolimus discontinued on 6/15/20\par Prednisone discontinued on 6/15/20\par Reviewed signs/symptoms of GVHD (i.e. rash, mucositis, nausea/vomiting, diarrhea)\par \par aGVHD of skin \par Maculopapular pruritic rash of face, neck, and upper chest (BSA 19%) noted 1/21/20 in setting of tapering prednisone (for FTT) - max skin stage 1, overall grade 1\par 1/21/20: rash on face, neck, and chest (BSA 19%); skin 1, overall grade 1; prednisone increased to 10mg daily\par 2/14/20: rash on neck, chest, and bilateral upper arms (BSA 18%); skin 1, overall grade 1: prednisone increased to 20mg daily\par 2/21/20: rash on neck and chest (10% BSA); skin 1, overall grade 1. Continue prednisone 20mg daily\par 3/12/20: rash on neck and chest (10% BSA); skin 1, overall grade 1. Continue prednisone 15mg daily  \par 3/19/20: rash on neck and chest (10% BSA); skin 1, overall grade 1. Continue prednisone 15 mg daily\par 4/2/20: rash on neck and chest (10% BSA); skin 1, overall grade 1. Continue prednisone 15 mg daily\par 4/9/20: rash resolved, prednisone decreased to 10mg daily\par 4/17/20: rash on neck and chest (10% BSA); skin 1, overall grade 1. Continue prednisone 10mg daily and reinforced compliance with triamcinolone cream bid \par 5/8/20: waxing and waning pruritic erythema of upper back, unsure if GVHD. Continue prednisone 10mg daily at this time\par 5/14/20: mild erythema of neck and upper back ( 5% BSA); skin 1, overall grade 1. Continue prednisone 10 mg daily\par 5/22/20: faint pruritic erythema of upper chest and upper back (9% BSA); skin 1, overall grade 1. Prednisone decreased 5mg daily for drop in chimerism \par 6/5/20: monitor very faint erythema of chest and upper back. Continue triamcinolone 0.1% cream bid PRN rash \par Continue to monitor closely \par 6/23/20: Mild erythema of upper back and bilateral upper extremities.Continue triamcinolone 0.1% cream bid PRN rash \par 7/2/20: pruritic erythema of upper back, chest, and bilateral upper arms (36% BSA). Defer starting oral immunosuppression at this time given drop in chimerism. Start Claritin daily and Pepcid bid. Will attempt to attain insurance auth for Lidex 0.1% cream bid, continue to monitor closely\par 7/8/20: Pruritic erythema of chest and bilateral upper arms (18% BSA). Patient will pay out of pocket today for lidex cream. To be applied BID. Continue claritin and pepcid. \par 7/21/20: no rash \par 7/30/20: erythematous rash of chest (9% BSA), continue Lidex cream bid \par 8/27/20: Erythematous rash of chest and back ( 9% BSA). Continue lidex cream BID\par 9/8/20: erythematous rash of BUEs (18% BSA), continue Lidex cream bid \par Continue PRN Lidex cream for rash flares\par \par 5) GI\par Continue ppx:\par - Protonix 40 mg daily\par - Ursodiol 300 mg bid\par PRN Zofran and Reglan for nausea/vomiting\par \par 6) Cardiac\par HTN \par Metoprolol succinate 25 mg daily - discontinued by cardiology\par Continue meds as below\par Soft BP though asymptomatic\par \par Cardiomyopathy\par ECHO completed on  6/24/20, ordered by cardiology\par ECHO completed on 11/5/20 - Left ventricular ejection fraction of 20%\par Continue meds as prescribed:\par - Carvedilol 6.25 mg bid\par - Valsartan 40 mg daily\par - Lasix 40 mg bid\par Continue f/u with cardiology \par \par Shortness of breath\par STAT chest xray completed on 11/4/20 - heart enlarged with small bilateral pleural effusions\par Increased lasix to 80 mg BID on 11/4/20 for two days. Then decrease lasix to 40 mg BID.\par Echo scheduled for 11/5/20. Patient and patients girlfriend aware patient needs to see Dr Lyn. \par Patient is aware he cannot have any  ETOH . Dr Gaytan met with patient and explained patient cannot drink ETOH. Contributing to fluid overload and heart failure. \par RESOLVED\par \par 7) Other\par BPH - continue finasteride 5mg daily and tamsulosin 0.4 mg daily \par Anxiety/Depression - continue Zoloft 50 mg daily\par Insomnia - continue PRN Xanax 0.5-1 mg qhs\par Pruritus - continue Pepcid 20 mg bid and Zyrtec 10 mg daily \par Mild erythema of bilateral antecubital fossa- encouraged patient to apply hydrocortisone cream BID\par \par 8) Plan/Dispo\par Pt educated regarding plan of care, all questions/concerns addressed\par Continue f/u every 4 weeks\par

## 2021-01-27 NOTE — REVIEW OF SYSTEMS
[Lower Ext Edema] : lower extremity edema [Negative] : Allergic/Immunologic [Chest Pain] : no chest pain [Palpitations] : no palpitations [Leg Claudication] : no intermittent leg claudication [FreeTextEntry5] : Mild edema of bilateral lower extremities improved [de-identified] : Mild erythema of bilateral antecubital fossa

## 2021-01-27 NOTE — PHYSICAL EXAM
[Ambulatory and capable of all self care but unable to carry out any work activities] : Status 2- Ambulatory and capable of all self care but unable to carry out any work activities. Up and about more than 50% of waking hours [Normal] : affect appropriate [No active (erythematous_ GVHD rash)] : Skin: No active (erythematous_ GVHD rash) [< 2 mg/dl] : Liver: < 2 mg/dl [No or intermittent] : Upper GI: No or intermittent nausea, vomiting or anorexia [<500 ml/day or < 3 episodes/day] : Lower GI (stool output/day): <500 ml/day or <3 episodes/day [No] : No [de-identified] : trace BLE edema [de-identified] : +BS; abdomen soft, non-distended, non-tender [de-identified] : Mild erythema of bilateral antecubital fossa resolved [FreeTextEntry1] : 10/9/19

## 2021-01-27 NOTE — HISTORY OF PRESENT ILLNESS

## 2021-02-05 PROBLEM — Z01.818 PREOP TESTING: Status: ACTIVE | Noted: 2020-01-01

## 2021-02-18 PROBLEM — R22.31 LOCALIZED SWELLING ON RIGHT HAND: Status: ACTIVE | Noted: 2021-01-01

## 2021-03-10 NOTE — ASSESSMENT
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 with the following comorbidities being managed:\par \par 1) AML\par S/p haplo (son) PSCT on 10/9/19\par 6/5/20 chimerism = 74.3% donor\par 6/23/20 chimerism = 67.7% donor (CD33 = 30% and CD3 = 100% donor)\par 6/25/20 BMbx: VINCE, normal male karyotype \par 7/8/20 chimerism = 57.7% donor (CD3 = 99% and CD33 = 10% donor)\par 7/21/20 chimerism = 59.3% donor (CD3 = 98.3% and CD33 = 7.3% donor)\par 8/13/20 chimerism = 56.7 % donor\par 9/2020 chimerism = 53%\par 9/24/20 chimerism = 55% donor\par 10/8/20 chimerism = 53%\par 10/22/20 chimerism = 38.3 % donor (CD 3 = 99% and CD 33= 1% donor)\par 11/4/20 chimerism = 51.3 % donor (CD 3 = 99% and CD 33= 3% donor)\par 12/3/20 chimerism = 41% donor (CD3 = 99% and CD33 = 1% donor)\par 12/14/20 chimerism= 36.7 % donor ( CD 3= 99% and CD 33=0.3% donor)\par 12/29/20 chimerism= 36.3 % donor ( CD 3= 99% and CD 33= 0.3% donor)\par 1/15/21 36%\par 2/8/21 chimerism= 30% donor ( CD 3 = 98% and CD 33= 0% donor)\par Current disease status: VINCE based on 6/25/20 BMbx and ongoing peripheral blood work.\par \par In setting of dropping chimerism, SQ Vidaza 32mg/m2 daily x 5 days every 28 days started 7/27/20 \par    Cycle 2 - 8/24-8/28 \par    Cycle 3 - 9/21-9/25\par    Cycle 4 - 10/19 - 10/23\par    Cycle 5 - 11/16 - 11/20\par    Cycle 6 - 12/14 -12/19\par    Cycle 7- 1/11/21- 1/15/21\par    Cycle 8- 2/8/21- 2/12/21\par     Cycle 9- 3/8/21- 3/12/21 - Receiving this week\par \par S/p DLI #1 on 8/13/20\par S/P DLI #2 on 10/8/20\par S/p DLI #3 on 12/3/20\par \par 2) Heme\par ABO compatible transplant: pt and donor are both A pos\par Ongoing anemia and thrombocytopenia, poor graft function with dropping CD 33 chimerism \par    3/8/21:  WBC 7.28  Hgb 8.8  PLT 78  ANC 4.22 \par Continue multivitamin and folic acid daily\par \par Hx of recurrent DVT/PE\par Factor V Leiden reportedly runs in the family \par 11/21/19 Doppler US of RUE negative for DVT\par Doppler US of BLEs on 7/2/20 negative for DVT\par Xarelto on hold while pancytopenic, to resume when PLT consistently >100K\par \par 3) ID\par Continue ppx:\par - Acyclovir 400 mg bid \par - Voriconazole 200 mg bid - hx of aspergillosis \par Mepron d/c'd 12/16/20 \par Post transplant crowd and dietary restrictions reviewed - in light of covid-19, CDC recs reviewed and strict restrictions reinforced \par \par CMV viremia\par CMV PCR peaked at 8,581 on 11/20/19\par Valcyte 450mg BID started 11/21/19. Decreased to QD as of 12/17/19. D/c'd 2/14/20 for ongoing thrombocytopenia \par 12/14/20 CMV PCR not detected \par 12/29/20 CMV PCR not detected\par 2/8/21 CMV PCR not detected\par Continue to monitor PCR at ever visit \par \par 4) GVHD\par Skin 1  Liver 0   GI 0 - overall grade 1\par No signs of chronic GVHD at this time\par Off MMF as of 11/22/19 \par Tacrolimus discontinued on 6/15/20\par Prednisone discontinued on 6/15/20\par Reviewed signs/symptoms of GVHD (i.e. rash, mucositis, nausea/vomiting, diarrhea)\par \par aGVHD of skin \par Maculopapular pruritic rash of face, neck, and upper chest (BSA 19%) noted 1/21/20 in setting of tapering prednisone (for FTT) - max skin stage 1, overall grade 1\par 1/21/20: rash on face, neck, and chest (BSA 19%); skin 1, overall grade 1; prednisone increased to 10mg daily\par 2/14/20: rash on neck, chest, and bilateral upper arms (BSA 18%); skin 1, overall grade 1: prednisone increased to 20mg daily\par 2/21/20: rash on neck and chest (10% BSA); skin 1, overall grade 1. Continue prednisone 20mg daily\par 3/12/20: rash on neck and chest (10% BSA); skin 1, overall grade 1. Continue prednisone 15mg daily  \par 3/19/20: rash on neck and chest (10% BSA); skin 1, overall grade 1. Continue prednisone 15 mg daily\par 4/2/20: rash on neck and chest (10% BSA); skin 1, overall grade 1. Continue prednisone 15 mg daily\par 4/9/20: rash resolved, prednisone decreased to 10mg daily\par 4/17/20: rash on neck and chest (10% BSA); skin 1, overall grade 1. Continue prednisone 10mg daily and reinforced compliance with triamcinolone cream bid \par 5/8/20: waxing and waning pruritic erythema of upper back, unsure if GVHD. Continue prednisone 10mg daily at this time\par 5/14/20: mild erythema of neck and upper back ( 5% BSA); skin 1, overall grade 1. Continue prednisone 10 mg daily\par 5/22/20: faint pruritic erythema of upper chest and upper back (9% BSA); skin 1, overall grade 1. Prednisone decreased 5mg daily for drop in chimerism \par 6/5/20: monitor very faint erythema of chest and upper back. Continue triamcinolone 0.1% cream bid PRN rash \par Continue to monitor closely \par 6/23/20: Mild erythema of upper back and bilateral upper extremities.Continue triamcinolone 0.1% cream bid PRN rash \par 7/2/20: pruritic erythema of upper back, chest, and bilateral upper arms (36% BSA). Defer starting oral immunosuppression at this time given drop in chimerism. Start Claritin daily and Pepcid bid. Will attempt to attain insurance auth for Lidex 0.1% cream bid, continue to monitor closely\par 7/8/20: Pruritic erythema of chest and bilateral upper arms (18% BSA). Patient will pay out of pocket today for lidex cream. To be applied BID. Continue claritin and pepcid. \par 7/21/20: no rash \par 7/30/20: erythematous rash of chest (9% BSA), continue Lidex cream bid \par 8/27/20: Erythematous rash of chest and back ( 9% BSA). Continue lidex cream BID\par 9/8/20: erythematous rash of BUEs (18% BSA), continue Lidex cream bid \par 3/8/21: Mild erythematous rash of bilateral lower extremities and chest. BSA 22.5%. Recommended patient to apply bacitracin and hydrocortisone cream BID. Patient is aware to call office if rash worsens. \par \par 5) GI\par Continue ppx:\par - Protonix 40 mg daily\par - Ursodiol 300 mg bid\par PRN Zofran and Reglan for nausea/vomiting\par \par 6) Cardiac\par HTN \par Metoprolol succinate 25 mg daily - discontinued by cardiology\par Continue meds as below\par Soft BP though asymptomatic\par \par Cardiomyopathy\par ECHO completed on  6/24/20, ordered by cardiology\par ECHO completed on 11/5/20 - Left ventricular ejection fraction of 20%\par Continue meds as prescribed:\par - Carvedilol 6.25 mg bid\par - Valsartan 40 mg daily\par - Lasix 40 mg bid\par Continue f/u with cardiology \par \par Shortness of breath\par STAT chest xray completed on 11/4/20 - heart enlarged with small bilateral pleural effusions\par Increased lasix to 80 mg BID on 11/4/20 for two days. Then decrease lasix to 40 mg BID.\par Echo scheduled for 11/5/20. Patient and patients girlfriend aware patient needs to see Dr Lyn. \par Patient is aware he cannot have any  ETOH . Dr Gaytan met with patient and explained patient cannot drink ETOH. Contributing to fluid overload and heart failure. \par RESOLVED\par \par 7) Other\par BPH - continue finasteride 5mg daily and tamsulosin 0.4 mg daily \par Anxiety/Depression - continue Zoloft 50 mg daily\par Insomnia - continue PRN Xanax 0.5-1 mg qhs\par Pruritus - continue Pepcid 20 mg bid and Zyrtec 10 mg daily \par \par 8) Plan/Dispo\par Pt educated regarding plan of care, all questions/concerns addressed\par Continue f/u with YADIRA Banda on 4/5/21\par

## 2021-03-10 NOTE — REVIEW OF SYSTEMS
[Skin Rash] : skin rash [Negative] : Cardiovascular [Chest Pain] : no chest pain [Palpitations] : no palpitations [Leg Claudication] : no intermittent leg claudication [Lower Ext Edema] : no lower extremity edema [Skin Wound] : no skin wound [de-identified] : erythematous rash of bilateral lower extremities and chest.

## 2021-03-10 NOTE — HISTORY OF PRESENT ILLNESS
[de-identified] : Mr. Delong is a 58 y/o male, with a previously diagnosed acute myeloid leukemia. A bone marrow biopsy from 7/3 revealed Acute myeloid leukemia (AML). FISH - report shows a population of aberrant myeloid blasts. He is currently being treated by Dr. Coelho for high risk AML with HIDAC consolidation chemotherapy. He was referred by Dr. Coelho for an initial consultation of a Haplo- transplant. \par \par The patient has a history of multiple blood clots - factor V Leiden runs in the family. He was diagnosed with sleep apnea, but refuses to use CPAP. He is a former smoker, 40 years. He also has a past medical history of PE, HTN, cardiomyopathy. He recently underwent an amputation of the right first toe due to an infection.\par \par S/p hospitalization at [facility] 10/3/19 - 11/1/19 for haplo (son) SCT. \par Upon admission, a TLC was placed in IR. Mr. Delong received IV fluid hydration, pain management, nutritional support, anxiolysis, antiemetics, and antiviral,  antifungal, antibacterial, GI, PCP and VOD prophylaxis. When his ANC dropped  below 1000, he was started on prophylactic Cefepime. Labs were monitored on a daily basis and he received transfusional support and electrolyte repletion as needed. \par \par While in patient, Mr. Delong was continued on his Voriconazole for history of aspergillosis. \par \par S/p hospitalization at [facility] 10/3/19 - 11/1/19 for haplo (son) SCT. \par Upon admission, a TLC was placed in IR. Mr. Delong received IV fluid hydration, pain management, nutritional support, anxiolysis, antiemetics, and antiviral, antifungal, antibacterial, GI, PCP and VOD prophylaxis. When his ANC dropped below 1000, he was started on prophylactic Cefepime. Labs were monitored on a daily basis and he received transfusional support and electrolyte repletion as needed. \par \par While in patient, Mr. Delong was continued on his Voriconazole for history of aspergillosis. \par \par During admission, Mr. Delong had pancytopenia related to the high dose chemotherapy prep regimen. He also experienced neutropenic fevers. When he became febrile, blood and urine cultures were sent and a CXR was completed which were unremarkable. \par \par On 10/9/2019, following premedications, pt received 250 ml of allogeneic, related, haplo, fresh, mobilized HPC apheresis over 30-60 minutes. \par Cell counts as follows: \par \par Total MNCs (x10^8/kg) = 5.17 \par CD 34 cells (x10^6/kg) = 7.34 \par CD 3 Cells (x 10^7/kg) = 19.81 \par Cell viability (%) = 100 \par \par Pt tolerated infusion without any adverse reaction or complications. \par \par Engraftment was noted on 10/28/2019. Daily Zarxio was discontinued, as was Cefepime given negative cultures. Mr. Delong was discharged home to f/u at the [facility].  [de-identified] : On 12/16/20 visit, presents for Vidaza this week 12/14-12/19. Overall feeling OK. Ongoing fatigue. Stable SOB with exertion. Reports adequate PO intake and hydration. Drinking 1 glass of wine per week. Intermittent pruritic rash of upper back, intermittently using steroid cream. Trace BLE edema waxes and wanes, currently taking Lastix 40mg bid. Compliant with post transplant meds and restrictions. Denies fever, chills, headache, dizziness, blurred vision, mucositis/odynophagia, chest pain, cough, nausea/vomiting, diarrhea/constipation, abdominal pain, dysuria, bleeding, pain\par \par On 12/29/20, patient presents for a follow up visit. Overall Young is well and offers no acute concerns. Mild erythema of bilateral antecubital fossa. Denies fever, chills, nausea, vomiting, diarrhea, rash, mouth sores, dysuria or any signs of active bleeding. Denies SOB or chest pain. Mild edema of bilateral lower extremities. Remains compliant with medications as prescribed. \par \par On 1/13/21, day + 462 post stem cell transplant. Overall patient is well and offers no acute concerns. Patient is receiving cycle 7 of vidaza this week. Continues to have mild erythema of bilateral antecubital fossa but, admits does not apply hydrocortisone cream BID. Denies fever, chills, nausea, vomiting, diarrhea, rash, mouth sores, dysuria or any signs of active bleeding. Denies SOB or chest pain. Mild edema of bilateral lower extremities. Remains compliant with medications as prescribed. \par \par 1/27/21 Pt doing well...no new issues....tolerating vidazza...\par \par On 3/8/21, patient presents for a follow up visit. Young complains of an erythematous rash of bilateral lower extremities and chest. Patient is receiving cycle 9 of vidaza this week. Denies fever, chills, nausea, vomiting, diarrhea, mouth sores, dysuria or any signs of active bleeding. Denies SOB, chest pain or B/L LE edema. Remains compliant with post transplant medications.

## 2021-03-10 NOTE — PHYSICAL EXAM
[Ambulatory and capable of all self care but unable to carry out any work activities] : Status 2- Ambulatory and capable of all self care but unable to carry out any work activities. Up and about more than 50% of waking hours [< 2 mg/dl] : Liver: < 2 mg/dl [No or intermittent] : Upper GI: No or intermittent nausea, vomiting or anorexia [<500 ml/day or < 3 episodes/day] : Lower GI (stool output/day): <500 ml/day or <3 episodes/day [No] : No [Normal] : no JVD, no calf tenderness, venous stasis changes, varices [Yes] : Yes [Maculopapular rash < 25% BSA] : Skin: Maculopapular rash < 25% BSA [I] : I [de-identified] : +BS; abdomen soft, non-distended, non-tender [de-identified] : erythematous rash of bilateral lower extremities and chest.  [FreeTextEntry1] : 10/9/19 [OnsetofaGVHD] : 3/8/21

## 2021-03-23 NOTE — DISCUSSION/SUMMARY
[FreeTextEntry1] : Patient volume status is stable on the Lasix 20 mg twice a day.  His lungs are clear and he has trace edema.  Carvedilol his heart rate is under 100.  Discussed the importance of hydration, low-salt diet, and abstaining from alcohol.  Also has a low hemoglobin which hopefully can be improved by the hematologist.\par \par He will stay on the present dose of Lasix and carvedilol.  If his blood pressure comes up over 100 he should restart the valsartan.  Did discuss the importance of a low-salt diet and no alcohol.  He will try to keep himself well-hydrated.\par \par I would see him again in a month to reevaluate his medical situation.

## 2021-04-11 PROBLEM — D64.9 ANEMIA: Status: ACTIVE | Noted: 2020-02-22

## 2021-04-11 PROBLEM — R21 RASH: Status: ACTIVE | Noted: 2021-01-01

## 2021-04-11 NOTE — PHYSICAL EXAM
[Ambulatory and capable of all self care but unable to carry out any work activities] : Status 2- Ambulatory and capable of all self care but unable to carry out any work activities. Up and about more than 50% of waking hours [Normal] : affect appropriate [< 2 mg/dl] : Liver: < 2 mg/dl [No or intermittent] : Upper GI: No or intermittent nausea, vomiting or anorexia [<500 ml/day or < 3 episodes/day] : Lower GI (stool output/day): <500 ml/day or <3 episodes/day [I] : I [de-identified] : +BS; abdomen soft, non-distended, non-tender [de-identified] : Erythematous rash of bilateral upper and lower extremities, chest, back  [No] : No [No active (erythematous_ GVHD rash)] : Skin: No active (erythematous_ GVHD rash) [FreeTextEntry1] : 10/9/19 [OnsetofaGVHD] : 3/8/21

## 2021-04-11 NOTE — ASSESSMENT
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 with the following comorbidities being managed:\par \par 1) AML\par S/p haplo (son) PSCT on 10/9/19\par 6/5/20 chimerism = 74.3% donor\par 6/23/20 chimerism = 67.7% donor (CD33 = 30% and CD3 = 100% donor)\par 6/25/20 BMbx: VINCE, normal male karyotype \par 7/8/20 chimerism = 57.7% donor (CD3 = 99% and CD33 = 10% donor)\par 7/21/20 chimerism = 59.3% donor (CD3 = 98.3% and CD33 = 7.3% donor)\par 8/13/20 chimerism = 56.7 % donor\par 9/2020 chimerism = 53%\par 9/24/20 chimerism = 55% donor\par 10/8/20 chimerism = 53%\par 10/22/20 chimerism = 38.3 % donor (CD 3 = 99% and CD 33= 1% donor)\par 11/4/20 chimerism = 51.3 % donor (CD 3 = 99% and CD 33= 3% donor)\par 12/3/20 chimerism = 41% donor (CD3 = 99% and CD33 = 1% donor)\par 12/14/20 chimerism= 36.7 % donor ( CD 3= 99% and CD 33=0.3% donor)\par 12/29/20 chimerism= 36.3 % donor ( CD 3= 99% and CD 33= 0.3% donor)\par 1/15/21 36%\par 2/8/21 chimerism= 30% donor ( CD 3 = 98% and CD 33= 0% donor)\par 3/8/21 chimerism= 28.3 % donor ( CD 3= 99% and CD 33=1% donor)\par Current disease status: VINCE based on 6/25/20 BMbx and ongoing peripheral blood work.\par \par In setting of dropping chimerism, SQ Vidaza 32mg/m2 daily x 5 days every 28 days started 7/27/20 \par    Cycle 2 - 8/24-8/28 \par    Cycle 3 - 9/21-9/25\par    Cycle 4 - 10/19 - 10/23\par    Cycle 5 - 11/16 - 11/20\par    Cycle 6 - 12/14 -12/19\par    Cycle 7- 1/11/21- 1/15/21\par    Cycle 8- 2/8/21- 2/12/21\par     Cycle 9- 3/8/21- 3/12/21\par    Cycle 10 - 4/5/21- 4/9/21\par \par S/p DLI #1 on 8/13/20\par S/P DLI #2 on 10/8/20\par S/p DLI #3 on 12/3/20\par \par 2) Heme\par ABO compatible transplant: pt and donor are both A pos\par Ongoing anemia and thrombocytopenia, poor graft function with dropping CD 33 chimerism \par    4/5/21:  WBC 5.05  Hgb 8.6  PLT 66  ANC 1.52\par Continue multivitamin and folic acid daily\par \par Hx of recurrent DVT/PE\par Factor V Leiden reportedly runs in the family \par 11/21/19 Doppler US of RUE negative for DVT\par Doppler US of BLEs on 7/2/20 negative for DVT\par Xarelto on hold while pancytopenic, to resume when PLT consistently >100K\par \par 3) ID\par Continue ppx:\par - Acyclovir 400 mg bid \par - Voriconazole 200 mg bid - hx of aspergillosis \par Mepron d/c'd 12/16/20 \par Post transplant crowd and dietary restrictions reviewed - in light of covid-19, [facility] recs reviewed and strict restrictions reinforced \par \par COVID Vaccine:\par Received Moderna COVID vaccine on 1/16/21 and 2/13/21\par \par \par CMV viremia\par CMV PCR peaked at 8,581 on 11/20/19\par Valcyte 450mg BID started 11/21/19. Decreased to QD as of 12/17/19. D/c'd 2/14/20 for ongoing thrombocytopenia \par 12/14/20 CMV PCR not detected \par 12/29/20 CMV PCR not detected\par 2/8/21 CMV PCR not detected\par Continue to monitor PCR at ever visit \par \par 4) GVHD\par Skin 0  Liver 0   GI 0 - overall grade 0\par No signs of chronic GVHD at this time\par Off MMF as of 11/22/19 \par Tacrolimus discontinued on 6/15/20\par Prednisone discontinued on 6/15/20\par Reviewed signs/symptoms of GVHD (i.e. rash, mucositis, nausea/vomiting, diarrhea)\par \par aGVHD of skin \par Maculopapular pruritic rash of face, neck, and upper chest (BSA 19%) noted 1/21/20 in setting of tapering prednisone (for FTT) - max skin stage 1, overall grade 1\par 1/21/20: rash on face, neck, and chest (BSA 19%); skin 1, overall grade 1; prednisone increased to 10mg daily\par 2/14/20: rash on neck, chest, and bilateral upper arms (BSA 18%); skin 1, overall grade 1: prednisone increased to 20mg daily\par 2/21/20: rash on neck and chest (10% BSA); skin 1, overall grade 1. Continue prednisone 20mg daily\par 3/12/20: rash on neck and chest (10% BSA); skin 1, overall grade 1. Continue prednisone 15mg daily  \par 3/19/20: rash on neck and chest (10% BSA); skin 1, overall grade 1. Continue prednisone 15 mg daily\par 4/2/20: rash on neck and chest (10% BSA); skin 1, overall grade 1. Continue prednisone 15 mg daily\par 4/9/20: rash resolved, prednisone decreased to 10mg daily\par 4/17/20: rash on neck and chest (10% BSA); skin 1, overall grade 1. Continue prednisone 10mg daily and reinforced compliance with triamcinolone cream bid \par 5/8/20: waxing and waning pruritic erythema of upper back, unsure if GVHD. Continue prednisone 10mg daily at this time\par 5/14/20: mild erythema of neck and upper back ( 5% BSA); skin 1, overall grade 1. Continue prednisone 10 mg daily\par 5/22/20: faint pruritic erythema of upper chest and upper back (9% BSA); skin 1, overall grade 1. Prednisone decreased 5mg daily for drop in chimerism \par 6/5/20: monitor very faint erythema of chest and upper back. Continue triamcinolone 0.1% cream bid PRN rash \par Continue to monitor closely \par 6/23/20: Mild erythema of upper back and bilateral upper extremities.Continue triamcinolone 0.1% cream bid PRN rash \par 7/2/20: pruritic erythema of upper back, chest, and bilateral upper arms (36% BSA). Defer starting oral immunosuppression at this time given drop in chimerism. Start Claritin daily and Pepcid bid. Will attempt to attain insurance auth for Lidex 0.1% cream bid, continue to monitor closely\par 7/8/20: Pruritic erythema of chest and bilateral upper arms (18% BSA). Patient will pay out of pocket today for lidex cream. To be applied BID. Continue claritin and pepcid. \par 7/21/20: no rash \par 7/30/20: erythematous rash of chest (9% BSA), continue Lidex cream bid \par 8/27/20: Erythematous rash of chest and back ( 9% BSA). Continue lidex cream BID\par 9/8/20: erythematous rash of BUEs (18% BSA), continue Lidex cream bid \par 3/8/21: Mild erythematous rash of bilateral lower extremities and chest. BSA 22.5%. Recommended patient to apply bacitracin and hydrocortisone cream BID. Patient is aware to call office if rash worsens. \par 4/5/21: Erythematous rash of bilateral upper and lower extremities, chest, back. Continue lidex cream to chest, back and upper extremities. Continue triamcinolone cream to B/L LE extremities as prescribed by dermatologist. \par \par 3/17/21: Right and Left leg punch biopsy of legs\par Right leg- Lichenoid dermatitis with epidermal hyperplasia. Note: the favored diagnosis on histological grounds alone is hypertrophic lichen planus. However, given the clinical information provided, a hypertrophic manifestation of graft vs host disease and a lichenoid drug eruption remain in the differential diagnosis.\par Left Leg- Lichenoid dermatitis with epidermal hyperplasia. Note: the favored diagnosis on histological grounds alone in hypertropic lichen planus. However, given the clinical information provided, a hypertrophic manifestation of graft vs host disease and a lichenoid drug eruption remain in the differential diagnosis.\par \par Recently finished a 10 day course of Keflex prescribed on 3/15/21 for bilateral lower extremities.\par \par 5) GI\par Continue ppx:\par - Protonix 40 mg daily\par - Ursodiol 300 mg bid\par PRN Zofran and Reglan for nausea/vomiting\par \par 6) Cardiac\par HTN \par Metoprolol succinate 25 mg daily - discontinued by cardiology\par Continue meds as below\par Soft BP though asymptomatic\par \par Cardiomyopathy\par ECHO completed on  6/24/20, ordered by cardiology\par ECHO completed on 11/5/20 - Left ventricular ejection fraction of 20%\par Continue meds as prescribed:\par - Carvedilol 6.25 mg bid\par - Valsartan 40 mg daily HOLDING as per cardiology\par - Lasix 20 mg bid\par Continue f/u with cardiology \par \par Shortness of breath\par STAT chest xray completed on 11/4/20 - heart enlarged with small bilateral pleural effusions\par Increased lasix to 80 mg BID on 11/4/20 for two days. Then decrease lasix to 40 mg BID.\par Echo scheduled for 11/5/20. Patient and patients girlfriend aware patient needs to see Dr Lyn. \par Patient is aware he cannot have any  ETOH . Dr Gaytan met with patient and explained patient cannot drink ETOH. Contributing to fluid overload and heart failure. \par RESOLVED\par \par 7) Other\par BPH - continue finasteride 5mg daily and tamsulosin 0.4 mg daily \par Anxiety/Depression - continue Zoloft 50 mg daily\par Insomnia - continue PRN Xanax 0.5-1 mg qhs\par Pruritus - continue Pepcid 20 mg bid and Zyrtec 10 mg daily \par \par 8) Plan/Dispo\par Pt educated regarding plan of care, all questions/concerns addressed\par Continue f/u with Dr Gaytan on 5/4/21\par

## 2021-04-11 NOTE — HISTORY OF PRESENT ILLNESS
[de-identified] : Mr. Delong is a 60 y/o male, with a previously diagnosed acute myeloid leukemia. A bone marrow biopsy from 7/3 revealed Acute myeloid leukemia (AML). FISH - report shows a population of aberrant myeloid blasts. He is currently being treated by Dr. Coelho for high risk AML with HIDAC consolidation chemotherapy. He was referred by Dr. Coelho for an initial consultation of a Haplo- transplant. \par \par The patient has a history of multiple blood clots - factor V Leiden runs in the family. He was diagnosed with sleep apnea, but refuses to use CPAP. He is a former smoker, 40 years. He also has a past medical history of PE, HTN, cardiomyopathy. He recently underwent an amputation of the right first toe due to an infection.\par \par S/p hospitalization at [facility] 10/3/19 - 11/1/19 for haplo (son) SCT. \par Upon admission, a TLC was placed in IR. Mr. Delong received IV fluid hydration, pain management, nutritional support, anxiolysis, antiemetics, and antiviral,  antifungal, antibacterial, GI, PCP and VOD prophylaxis. When his ANC dropped  below 1000, he was started on prophylactic Cefepime. Labs were monitored on a daily basis and he received transfusional support and electrolyte repletion as needed. \par \par While in patient, Mr. Delong was continued on his Voriconazole for history of aspergillosis. \par \par S/p hospitalization at [facility] 10/3/19 - 11/1/19 for haplo (son) SCT. \par Upon admission, a TLC was placed in IR. Mr. Delong received IV fluid hydration, pain management, nutritional support, anxiolysis, antiemetics, and antiviral, antifungal, antibacterial, GI, PCP and VOD prophylaxis. When his ANC dropped below 1000, he was started on prophylactic Cefepime. Labs were monitored on a daily basis and he received transfusional support and electrolyte repletion as needed. \par \par While in patient, Mr. Delong was continued on his Voriconazole for history of aspergillosis. \par \par During admission, Mr. Delong had pancytopenia related to the high dose chemotherapy prep regimen. He also experienced neutropenic fevers. When he became febrile, blood and urine cultures were sent and a CXR was completed which were unremarkable. \par \par On 10/9/2019, following premedications, pt received 250 ml of allogeneic, related, haplo, fresh, mobilized HPC apheresis over 30-60 minutes. \par Cell counts as follows: \par \par Total MNCs (x10^8/kg) = 5.17 \par CD 34 cells (x10^6/kg) = 7.34 \par CD 3 Cells (x 10^7/kg) = 19.81 \par Cell viability (%) = 100 \par \par Pt tolerated infusion without any adverse reaction or complications. \par \par Engraftment was noted on 10/28/2019. Daily Zarxio was discontinued, as was Cefepime given negative cultures. Mr. Delong was discharged home to f/u at the [facility].  [de-identified] : On 12/16/20 visit, presents for Vidaza this week 12/14-12/19. Overall feeling OK. Ongoing fatigue. Stable SOB with exertion. Reports adequate PO intake and hydration. Drinking 1 glass of wine per week. Intermittent pruritic rash of upper back, intermittently using steroid cream. Trace BLE edema waxes and wanes, currently taking Lastix 40mg bid. Compliant with post transplant meds and restrictions. Denies fever, chills, headache, dizziness, blurred vision, mucositis/odynophagia, chest pain, cough, nausea/vomiting, diarrhea/constipation, abdominal pain, dysuria, bleeding, pain\par \par On 12/29/20, patient presents for a follow up visit. Overall Young is well and offers no acute concerns. Mild erythema of bilateral antecubital fossa. Denies fever, chills, nausea, vomiting, diarrhea, rash, mouth sores, dysuria or any signs of active bleeding. Denies SOB or chest pain. Mild edema of bilateral lower extremities. Remains compliant with medications as prescribed. \par \par On 1/13/21, day + 462 post stem cell transplant. Overall patient is well and offers no acute concerns. Patient is receiving cycle 7 of vidaza this week. Continues to have mild erythema of bilateral antecubital fossa but, admits does not apply hydrocortisone cream BID. Denies fever, chills, nausea, vomiting, diarrhea, rash, mouth sores, dysuria or any signs of active bleeding. Denies SOB or chest pain. Mild edema of bilateral lower extremities. Remains compliant with medications as prescribed. \par \par 1/27/21 Pt doing well...no new issues....tolerating vidazza...\par \par On 3/8/21, patient presents for a follow up visit. Young complains of an erythematous rash of bilateral lower extremities and chest. Patient is receiving cycle 9 of vidaza this week. Denies fever, chills, nausea, vomiting, diarrhea, mouth sores, dysuria or any signs of active bleeding. Denies SOB, chest pain or B/L LE edema. Remains compliant with post transplant medications. \par \par On 4/5/21, patient presents for a follow up visit. Young has an erythematous rash of bilateral upper and lower extremities, chest and back. Went to dermatology recently and had  punch biopsies of bilateral lower extremities. Has been applying lidex cream and triamcinolone cream. States does not have an appetite. Denies fever, chills, nausea, vomiting, diarrhea, mouth sores, dysuria or any signs of active bleeding. Denies SOB, chest pain or B/L LE edema.

## 2021-04-11 NOTE — REVIEW OF SYSTEMS
[Skin Rash] : skin rash [Negative] : Allergic/Immunologic [Chest Pain] : no chest pain [Palpitations] : no palpitations [Leg Claudication] : no intermittent leg claudication [Lower Ext Edema] : no lower extremity edema [Skin Wound] : no skin wound [FreeTextEntry7] : No appetite [de-identified] : Erythematous rash of bilateral upper and lower extremities, chest, back

## 2021-04-22 PROBLEM — R00.0 TACHYCARDIA: Status: ACTIVE | Noted: 2019-08-27

## 2021-04-26 NOTE — DISCUSSION/SUMMARY
[FreeTextEntry1] : The patient is clinically stable.  On the furosemide 20 mg twice a day his volume status is good.  On low-dose carvedilol his heart rate is less than 100 and blood pressure is low normal.  He will try to keep himself well-hydrated.  Starting chemotherapy again which may cause nausea, vomiting, and diarrhea.  This tends to make him dehydrated.\par \par If his blood pressure is consistently over the 100 he would call me and we may try him back on low-dose valsartan.\par \par If he has any problems he would call me.  If all is well I will see him in 2 months.  Did go over his medication I answered his questions.

## 2021-04-26 NOTE — HISTORY OF PRESENT ILLNESS
[FreeTextEntry1] : Young presents for evaluation of cardiomyopathy. He has had a complicated medical history and is now s/p bone marrow transplant. He has been treated for AML including consolidation therapy in July of last year. He developed osteomyelitis of the right toe for which he underwent amputation. He has a known cardiomyopathy but had no cardiac complications throughout his hospitalization. He did have neutropenic fevers but no heart failure, dysrhythmias, or other cardiac problems. He is feeling well today and has no specific complaints.\par \par Since 7/19 LV systolic function has been deteriorating. LVEF from 40% to 20%. MR scan of the heart 9/19 demonstrated ejection fraction of 24%. Recently the patient has developed increasing water retention with weight gain, increasing shortness of breath. His Lasix was doubled and he has lost about 6 pounds of water weight.\par \par His medicines have been limited by hypotension.  He is now taking Lasix 20 mg twice a day, and carvedilol 3.1.25 mg twice a day.  Not able to take the valsartan because of hypotension.\par \par Past medical is remarkable for factor V Leiden mutation known in the family, history of DVT, pulmonary embolism, hypertension, tachycardia\par \par Social history is remarkable for smoking for approximately 45 years, stopped last year.  He has worked as a . The patient had been drinking alcohol which he stopped and unfortunately is having a high salt diet\par \par Family history is notable for both parents dying together in an accident. His father had successful bypass surgery at an older age\par \par I discussed his case with both electrophysiologist and the oncologist. Because of his present blood counts he is not considered an ideal candidate for any device or intervention. We are treating him medically.\par \par His recent blood work 4/21 demonstrated a hemoglobin of 8.6 with hematocrit 27.  Creatinine 1.14, BUN 18, potassium 4.4.

## 2021-05-05 PROBLEM — Z72.89 ALCOHOL USE: Status: ACTIVE | Noted: 2019-08-28

## 2021-05-05 NOTE — PHYSICAL EXAM
[Ambulatory and capable of all self care but unable to carry out any work activities] : Status 2- Ambulatory and capable of all self care but unable to carry out any work activities. Up and about more than 50% of waking hours [Normal] : affect appropriate [Maculopapular rash 25-50% BSA] : Skin: Maculopapular rash 25-50% BSA [< 2 mg/dl] : Liver: < 2 mg/dl [No or intermittent] : Upper GI: No or intermittent nausea, vomiting or anorexia [<500 ml/day or < 3 episodes/day] : Lower GI (stool output/day): <500 ml/day or <3 episodes/day [I] : I [No] : No [Total % ____] : Total: [unfilled]% [de-identified] : +BS; abdomen soft, non-distended, non-tender [de-identified] : Erythematous rash of bilateral upper and lower extremities, chest, back  [FreeTextEntry1] : 10/9/19 [OnsetofaGVHD] : 3/8/21

## 2021-05-05 NOTE — HISTORY OF PRESENT ILLNESS
[de-identified] : Mr. Delong is a 60 y/o male, with a previously diagnosed acute myeloid leukemia. A bone marrow biopsy from 7/3 revealed Acute myeloid leukemia (AML). FISH - report shows a population of aberrant myeloid blasts. He is currently being treated by Dr. Coelho for high risk AML with HIDAC consolidation chemotherapy. He was referred by Dr. Coelho for an initial consultation of a Haplo- transplant. \par \par The patient has a history of multiple blood clots - factor V Leiden runs in the family. He was diagnosed with sleep apnea, but refuses to use CPAP. He is a former smoker, 40 years. He also has a past medical history of PE, HTN, cardiomyopathy. He recently underwent an amputation of the right first toe due to an infection.\par \par S/p hospitalization at [facility] 10/3/19 - 11/1/19 for haplo (son) SCT. \par Upon admission, a TLC was placed in IR. Mr. Delong received IV fluid hydration, pain management, nutritional support, anxiolysis, antiemetics, and antiviral,  antifungal, antibacterial, GI, PCP and VOD prophylaxis. When his ANC dropped  below 1000, he was started on prophylactic Cefepime. Labs were monitored on a daily basis and he received transfusional support and electrolyte repletion as needed. \par \par While in patient, Mr. Delong was continued on his Voriconazole for history of aspergillosis. \par \par S/p hospitalization at [facility]MT[facility] 10/3/19 - 11/1/19 for haplo (son) SCT. \par Upon admission, a TLC was placed in IR. Mr. Delong received IV fluid hydration, pain management, nutritional support, anxiolysis, antiemetics, and antiviral, antifungal, antibacterial, GI, PCP and VOD prophylaxis. When his ANC dropped below 1000, he was started on prophylactic Cefepime. Labs were monitored on a daily basis and he received transfusional support and electrolyte repletion as needed. \par \par While in patient, Mr. Delong was continued on his Voriconazole for history of aspergillosis. \par \par During admission, Mr. Delong had pancytopenia related to the high dose chemotherapy prep regimen. He also experienced neutropenic fevers. When he became febrile, blood and urine cultures were sent and a CXR was completed which were unremarkable. \par \par On 10/9/2019, following premedications, pt received 250 ml of allogeneic, related, haplo, fresh, mobilized HPC apheresis over 30-60 minutes. \par Cell counts as follows: \par \par Total MNCs (x10^8/kg) = 5.17 \par CD 34 cells (x10^6/kg) = 7.34 \par CD 3 Cells (x 10^7/kg) = 19.81 \par Cell viability (%) = 100 \par \par Pt tolerated infusion without any adverse reaction or complications. \par \par Engraftment was noted on 10/28/2019. Daily Zarxio was discontinued, as was Cefepime given negative cultures. Mr. Delong was discharged home to f/u at the [facility].  [de-identified] : On 12/16/20 visit, presents for Vidaza this week 12/14-12/19. Overall feeling OK. Ongoing fatigue. Stable SOB with exertion. Reports adequate PO intake and hydration. Drinking 1 glass of wine per week. Intermittent pruritic rash of upper back, intermittently using steroid cream. Trace BLE edema waxes and wanes, currently taking Lastix 40mg bid. Compliant with post transplant meds and restrictions. Denies fever, chills, headache, dizziness, blurred vision, mucositis/odynophagia, chest pain, cough, nausea/vomiting, diarrhea/constipation, abdominal pain, dysuria, bleeding, pain\par \par On 12/29/20, patient presents for a follow up visit. Overall Young is well and offers no acute concerns. Mild erythema of bilateral antecubital fossa. Denies fever, chills, nausea, vomiting, diarrhea, rash, mouth sores, dysuria or any signs of active bleeding. Denies SOB or chest pain. Mild edema of bilateral lower extremities. Remains compliant with medications as prescribed. \par \par On 1/13/21, day + 462 post stem cell transplant. Overall patient is well and offers no acute concerns. Patient is receiving cycle 7 of vidaza this week. Continues to have mild erythema of bilateral antecubital fossa but, admits does not apply hydrocortisone cream BID. Denies fever, chills, nausea, vomiting, diarrhea, rash, mouth sores, dysuria or any signs of active bleeding. Denies SOB or chest pain. Mild edema of bilateral lower extremities. Remains compliant with medications as prescribed. \par \par 1/27/21 Pt doing well...no new issues....tolerating vidazza...\par \par On 3/8/21, patient presents for a follow up visit. Young complains of an erythematous rash of bilateral lower extremities and chest. Patient is receiving cycle 9 of vidaza this week. Denies fever, chills, nausea, vomiting, diarrhea, mouth sores, dysuria or any signs of active bleeding. Denies SOB, chest pain or B/L LE edema. Remains compliant with post transplant medications. \par \par On 4/5/21, patient presents for a follow up visit. Young has an erythematous rash of bilateral upper and lower extremities, chest and back. Went to dermatology recently and had  punch biopsies of bilateral lower extremities. Has been applying lidex cream and triamcinolone cream. States does not have an appetite. Denies fever, chills, nausea, vomiting, diarrhea, mouth sores, dysuria or any signs of active bleeding. Denies SOB, chest pain or B/L LE edema. \par \par Today here for f/u..looks well...Ashlyn is on the phone..rash is waxing and waning...not compliant with creams...no n/v/d..skin bx c/w GVHD

## 2021-05-05 NOTE — ASSESSMENT
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 with the following comorbidities being managed:\par \par 1) AML\par S/p haplo (son) PSCT on 10/9/19\par 6/5/20 chimerism = 74.3% donor\par 6/23/20 chimerism = 67.7% donor (CD33 = 30% and CD3 = 100% donor)\par 6/25/20 BMbx: VINCE, normal male karyotype \par 7/8/20 chimerism = 57.7% donor (CD3 = 99% and CD33 = 10% donor)\par 7/21/20 chimerism = 59.3% donor (CD3 = 98.3% and CD33 = 7.3% donor)\par 8/13/20 chimerism = 56.7 % donor\par 9/2020 chimerism = 53%\par 9/24/20 chimerism = 55% donor\par 10/8/20 chimerism = 53%\par 10/22/20 chimerism = 38.3 % donor (CD 3 = 99% and CD 33= 1% donor)\par 11/4/20 chimerism = 51.3 % donor (CD 3 = 99% and CD 33= 3% donor)\par 12/3/20 chimerism = 41% donor (CD3 = 99% and CD33 = 1% donor)\par 12/14/20 chimerism= 36.7 % donor ( CD 3= 99% and CD 33=0.3% donor)\par 12/29/20 chimerism= 36.3 % donor ( CD 3= 99% and CD 33= 0.3% donor)\par 1/15/21 36%\par 2/8/21 chimerism= 30% donor ( CD 3 = 98% and CD 33= 0% donor)\par 3/8/21 chimerism= 28.3 % donor ( CD 3= 99% and CD 33=1% donor)\par 4/2021 39% donor\par Current disease status: VINCE based on 6/25/20 BMbx and ongoing peripheral blood work.\par \par In setting of dropping chimerism, SQ Vidaza 32mg/m2 daily x 5 days every 28 days started 7/27/20 \par    Cycle 2 - 8/24-8/28 \par    Cycle 3 - 9/21-9/25\par    Cycle 4 - 10/19 - 10/23\par    Cycle 5 - 11/16 - 11/20\par    Cycle 6 - 12/14 -12/19\par    Cycle 7- 1/11/21- 1/15/21\par    Cycle 8- 2/8/21- 2/12/21\par     Cycle 9- 3/8/21- 3/12/21\par    Cycle 10 - 4/5/21- 4/9/21\par    C 11 this week\par \par S/p DLI #1 on 8/13/20\par S/P DLI #2 on 10/8/20\par S/p DLI #3 on 12/3/20\par \par 2) Heme\par ABO compatible transplant: pt and donor are both A pos\par Ongoing anemia and thrombocytopenia, poor graft function with dropping CD 33 chimerism \par    4/5/21:  WBC 5.05  Hgb 8.6  PLT 66  ANC 1.52...plts 70 today\par Continue multivitamin and folic acid daily\par \par Hx of recurrent DVT/PE\par Factor V Leiden reportedly runs in the family \par 11/21/19 Doppler US of RUE negative for DVT\par Doppler US of BLEs on 7/2/20 negative for DVT\par Xarelto on hold while pancytopenic, to resume when PLT consistently >100K\par \par 3) ID\par Continue ppx:\par - Acyclovir 400 mg bid \par - Voriconazole 200 mg bid - hx of aspergillosis \par Mepron d/c'd 12/16/20 \par Post transplant crowd and dietary restrictions reviewed - in light of covid-19, CDC recs reviewed and strict restrictions reinforced \par \par COVID Vaccine:\par Received Moderna COVID vaccine on 1/16/21 and 2/13/21\par \par \par CMV viremia\par CMV PCR peaked at 8,581 on 11/20/19\par Valcyte 450mg BID started 11/21/19. Decreased to QD as of 12/17/19. D/c'd 2/14/20 for ongoing thrombocytopenia \par 12/14/20 CMV PCR not detected \par 12/29/20 CMV PCR not detected\par 2/8/21 CMV PCR not detected\par Continue to monitor PCR at ever visit \par \par 4) GVHD\par Skin 2  Liver 0   GI 0 - overall grade 1.....50%\par cont topical steroid cream bid\par No signs of chronic GVHD at this time\par Off MMF as of 11/22/19 \par Tacrolimus discontinued on 6/15/20\par Prednisone discontinued on 6/15/20\par Reviewed signs/symptoms of GVHD (i.e. rash, mucositis, nausea/vomiting, diarrhea)\par \par aGVHD of skin \par Maculopapular pruritic rash of face, neck, and upper chest (BSA 19%) noted 1/21/20 in setting of tapering prednisone (for FTT) - max skin stage 1, overall grade 1\par 1/21/20: rash on face, neck, and chest (BSA 19%); skin 1, overall grade 1; prednisone increased to 10mg daily\par 2/14/20: rash on neck, chest, and bilateral upper arms (BSA 18%); skin 1, overall grade 1: prednisone increased to 20mg daily\par 2/21/20: rash on neck and chest (10% BSA); skin 1, overall grade 1. Continue prednisone 20mg daily\par 3/12/20: rash on neck and chest (10% BSA); skin 1, overall grade 1. Continue prednisone 15mg daily  \par 3/19/20: rash on neck and chest (10% BSA); skin 1, overall grade 1. Continue prednisone 15 mg daily\par 4/2/20: rash on neck and chest (10% BSA); skin 1, overall grade 1. Continue prednisone 15 mg daily\par 4/9/20: rash resolved, prednisone decreased to 10mg daily\par 4/17/20: rash on neck and chest (10% BSA); skin 1, overall grade 1. Continue prednisone 10mg daily and reinforced compliance with triamcinolone cream bid \par 5/8/20: waxing and waning pruritic erythema of upper back, unsure if GVHD. Continue prednisone 10mg daily at this time\par 5/14/20: mild erythema of neck and upper back ( 5% BSA); skin 1, overall grade 1. Continue prednisone 10 mg daily\par 5/22/20: faint pruritic erythema of upper chest and upper back (9% BSA); skin 1, overall grade 1. Prednisone decreased 5mg daily for drop in chimerism \par 6/5/20: monitor very faint erythema of chest and upper back. Continue triamcinolone 0.1% cream bid PRN rash \par Continue to monitor closely \par 6/23/20: Mild erythema of upper back and bilateral upper extremities.Continue triamcinolone 0.1% cream bid PRN rash \par 7/2/20: pruritic erythema of upper back, chest, and bilateral upper arms (36% BSA). Defer starting oral immunosuppression at this time given drop in chimerism. Start Claritin daily and Pepcid bid. Will attempt to attain insurance auth for Lidex 0.1% cream bid, continue to monitor closely\par 7/8/20: Pruritic erythema of chest and bilateral upper arms (18% BSA). Patient will pay out of pocket today for lidex cream. To be applied BID. Continue claritin and pepcid. \par 7/21/20: no rash \par 7/30/20: erythematous rash of chest (9% BSA), continue Lidex cream bid \par 8/27/20: Erythematous rash of chest and back ( 9% BSA). Continue lidex cream BID\par 9/8/20: erythematous rash of BUEs (18% BSA), continue Lidex cream bid \par 3/8/21: Mild erythematous rash of bilateral lower extremities and chest. BSA 22.5%. Recommended patient to apply bacitracin and hydrocortisone cream BID. Patient is aware to call office if rash worsens. \par 4/5/21, 5/4/21: Erythematous rash of bilateral upper and lower extremities, chest, back. Continue lidex cream to chest, back and upper extremities. Continue triamcinolone cream to B/L LE extremities as prescribed by dermatologist. 50%\par \par 3/17/21: Right and Left leg punch biopsy of legs\par Right leg- Lichenoid dermatitis with epidermal hyperplasia. Note: the favored diagnosis on histological grounds alone is hypertrophic lichen planus. However, given the clinical information provided, a hypertrophic manifestation of graft vs host disease and a lichenoid drug eruption remain in the differential diagnosis.\par Left Leg- Lichenoid dermatitis with epidermal hyperplasia. Note: the favored diagnosis on histological grounds alone in hypertropic lichen planus. However, given the clinical information provided, a hypertrophic manifestation of graft vs host disease and a lichenoid drug eruption remain in the differential diagnosis.\par \par Recently finished a 10 day course of Keflex prescribed on 3/15/21 for bilateral lower extremities.\par \par 5) GI\par Continue ppx:\par - Protonix 40 mg daily\par - Ursodiol 300 mg bid\par PRN Zofran and Reglan for nausea/vomiting\par \par 6) Cardiac\par HTN \par Metoprolol succinate 25 mg daily - discontinued by cardiology\par Continue meds as below\par Soft BP though asymptomatic\par \par Cardiomyopathy\par ECHO completed on  6/24/20, ordered by cardiology\par ECHO completed on 11/5/20 - Left ventricular ejection fraction of 20%\par Continue meds as prescribed:\par - Carvedilol 6.25 mg bid\par - Valsartan 40 mg daily HOLDING as per cardiology\par - Lasix 20 mg bid\par Continue f/u with cardiology \par \par Shortness of breath\par STAT chest xray completed on 11/4/20 - heart enlarged with small bilateral pleural effusions\par Increased lasix to 80 mg BID on 11/4/20 for two days. Then decrease lasix to 40 mg BID.\par Echo scheduled for 11/5/20. Patient and patients girlfriend aware patient needs to see Dr Lyn. \par Patient is aware he cannot have any  ETOH . Dr Gaytan met with patient and explained patient cannot drink ETOH. Contributing to fluid overload and heart failure. \par RESOLVED\par \par 7) Other\par BPH - continue finasteride 5mg daily and tamsulosin 0.4 mg daily \par Anxiety/Depression - continue Zoloft 50 mg daily\par Insomnia - continue PRN Xanax 0.5-1 mg qhs\par Pruritus - continue Pepcid 20 mg bid and Zyrtec 10 mg daily \par \par 8) Plan/Dispo\par Pt educated regarding plan of care, all questions/concerns addressed\par Continue f/u 4 weeks\par call if rash worsens...hope to see chimerism rise further....\par

## 2021-05-05 NOTE — REVIEW OF SYSTEMS
[Skin Rash] : skin rash [Negative] : Allergic/Immunologic [Chest Pain] : no chest pain [Palpitations] : no palpitations [Leg Claudication] : no intermittent leg claudication [Lower Ext Edema] : no lower extremity edema [Skin Wound] : no skin wound [FreeTextEntry7] : No appetite [de-identified] : Erythematous rash of bilateral upper and lower extremities, chest, back

## 2021-06-05 PROBLEM — Z86.79 HISTORY OF HYPERTENSION: Status: RESOLVED | Noted: 2018-02-02 | Resolved: 2021-01-01

## 2021-06-05 NOTE — REVIEW OF SYSTEMS
[Skin Rash] : skin rash [Negative] : Allergic/Immunologic [Chest Pain] : no chest pain [Palpitations] : no palpitations [Leg Claudication] : no intermittent leg claudication [Lower Ext Edema] : no lower extremity edema [Skin Wound] : no skin wound [FreeTextEntry7] : No appetite [de-identified] : Erythematous rash of bilateral upper and lower extremities, chest, back...resolved..now hyperpigmented

## 2021-06-05 NOTE — HISTORY OF PRESENT ILLNESS
[de-identified] : Mr. Delong is a 60 y/o male, with a previously diagnosed acute myeloid leukemia. A bone marrow biopsy from 7/3 revealed Acute myeloid leukemia (AML). FISH - report shows a population of aberrant myeloid blasts. He is currently being treated by Dr. Coelho for high risk AML with HIDAC consolidation chemotherapy. He was referred by Dr. Coelho for an initial consultation of a Haplo- transplant. \par \par The patient has a history of multiple blood clots - factor V Leiden runs in the family. He was diagnosed with sleep apnea, but refuses to use CPAP. He is a former smoker, 40 years. He also has a past medical history of PE, HTN, cardiomyopathy. 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 \par Upon admission, a TLC was placed in IR. Mr. Delong received IV fluid hydration, pain management, nutritional support, anxiolysis, antiemetics, and antiviral,  antifungal, antibacterial, GI, PCP and VOD prophylaxis. When his ANC dropped  below 1000, he was started on prophylactic Cefepime. Labs were monitored on a daily basis and he received transfusional support and electrolyte repletion as needed. \par \par While in patient, Mr. Delong was continued on his Voriconazole for history of aspergillosis. 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 \par Upon admission, a TLC was placed in IR. Mr. Delong received IV fluid hydration, pain management, nutritional support, anxiolysis, antiemetics, and antiviral, antifungal, antibacterial, GI, PCP and VOD prophylaxis. When his ANC dropped below 1000, he was started on prophylactic Cefepime. Labs were monitored on a daily basis and he received transfusional support and electrolyte repletion as needed. \par \par While in patient, Mr. Delong was continued on his Voriconazole for history of aspergillosis. \par \par During admission, Mr. Delong had pancytopenia related to the high dose chemotherapy prep regimen. He also experienced neutropenic fevers. When he became febrile, blood and urine cultures were sent and a CXR was completed which were unremarkable. \par \par On 10/9/2019, following premedications, pt received 250 ml of allogeneic, related, haplo, fresh, mobilized HPC apheresis over 30-60 minutes. \par Cell counts as follows: \par \par Total MNCs (x10^8/kg) = 5.17 \par CD 34 cells (x10^6/kg) = 7.34 \par CD 3 Cells (x 10^7/kg) = 19.81 \par Cell viability (%) = 100 \par \par Pt tolerated infusion without any adverse reaction or complications. \par \par Engraftment was noted on 10/28/2019. Daily Zarxio was discontinued, as was Cefepime given negative cultures. Mr. Delong was discharged home to f/u at the [facility].  [de-identified] : On 12/16/20 visit, presents for Vidaza this week 12/14-12/19. Overall feeling OK. Ongoing fatigue. Stable SOB with exertion. Reports adequate PO intake and hydration. Drinking 1 glass of wine per week. Intermittent pruritic rash of upper back, intermittently using steroid cream. Trace BLE edema waxes and wanes, currently taking Lastix 40mg bid. Compliant with post transplant meds and restrictions. Denies fever, chills, headache, dizziness, blurred vision, mucositis/odynophagia, chest pain, cough, nausea/vomiting, diarrhea/constipation, abdominal pain, dysuria, bleeding, pain\par \par On 12/29/20, patient presents for a follow up visit. Overall Young is well and offers no acute concerns. Mild erythema of bilateral antecubital fossa. Denies fever, chills, nausea, vomiting, diarrhea, rash, mouth sores, dysuria or any signs of active bleeding. Denies SOB or chest pain. Mild edema of bilateral lower extremities. Remains compliant with medications as prescribed. \par \par On 1/13/21, day + 462 post stem cell transplant. Overall patient is well and offers no acute concerns. Patient is receiving cycle 7 of vidaza this week. Continues to have mild erythema of bilateral antecubital fossa but, admits does not apply hydrocortisone cream BID. Denies fever, chills, nausea, vomiting, diarrhea, rash, mouth sores, dysuria or any signs of active bleeding. Denies SOB or chest pain. Mild edema of bilateral lower extremities. Remains compliant with medications as prescribed. \par \par 1/27/21 Pt doing well...no new issues....tolerating vidazza...\par \par On 3/8/21, patient presents for a follow up visit. Young complains of an erythematous rash of bilateral lower extremities and chest. Patient is receiving cycle 9 of vidaza this week. Denies fever, chills, nausea, vomiting, diarrhea, mouth sores, dysuria or any signs of active bleeding. Denies SOB, chest pain or B/L LE edema. Remains compliant with post transplant medications. \par \par On 4/5/21, patient presents for a follow up visit. Young has an erythematous rash of bilateral upper and lower extremities, chest and back. Went to dermatology recently and had  punch biopsies of bilateral lower extremities. Has been applying lidex cream and triamcinolone cream. States does not have an appetite. Denies fever, chills, nausea, vomiting, diarrhea, mouth sores, dysuria or any signs of active bleeding. Denies SOB, chest pain or B/L LE edema. \par \par Today here for f/u..looks well overall...Ashlyn is on the phone..rash is waxing and waning...compliant with creams...no n/v/d..skin bx c/w GVHD..overall improved

## 2021-06-05 NOTE — ASSESSMENT
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 with the following comorbidities being managed:\par \par 1) AML\par S/p haplo (son) PSCT on 10/9/19\par 6/5/20 chimerism = 74.3% donor\par 6/23/20 chimerism = 67.7% donor (CD33 = 30% and CD3 = 100% donor)\par 6/25/20 BMbx: VINCE, normal male karyotype \par 7/8/20 chimerism = 57.7% donor (CD3 = 99% and CD33 = 10% donor)\par 7/21/20 chimerism = 59.3% donor (CD3 = 98.3% and CD33 = 7.3% donor)\par 8/13/20 chimerism = 56.7 % donor\par 9/2020 chimerism = 53%\par 9/24/20 chimerism = 55% donor\par 10/8/20 chimerism = 53%\par 10/22/20 chimerism = 38.3 % donor (CD 3 = 99% and CD 33= 1% donor)\par 11/4/20 chimerism = 51.3 % donor (CD 3 = 99% and CD 33= 3% donor)\par 12/3/20 chimerism = 41% donor (CD3 = 99% and CD33 = 1% donor)\par 12/14/20 chimerism= 36.7 % donor ( CD 3= 99% and CD 33=0.3% donor)\par 12/29/20 chimerism= 36.3 % donor ( CD 3= 99% and CD 33= 0.3% donor)\par 1/15/21 36%\par 2/8/21 chimerism= 30% donor ( CD 3 = 98% and CD 33= 0% donor)\par 3/8/21 chimerism= 28.3 % donor ( CD 3= 99% and CD 33=1% donor)\par 4/2021 39% donor\par Current disease status: VINCE based on 6/25/20 BMbx and ongoing peripheral blood work.\par \par In setting of dropping chimerism, SQ Vidaza 32mg/m2 daily x 5 days every 28 days started 7/27/20 \par    Cycle 2 - 8/24-8/28 \par    Cycle 3 - 9/21-9/25\par    Cycle 4 - 10/19 - 10/23\par    Cycle 5 - 11/16 - 11/20\par    Cycle 6 - 12/14 -12/19\par    Cycle 7- 1/11/21- 1/15/21\par    Cycle 8- 2/8/21- 2/12/21\par     Cycle 9- 3/8/21- 3/12/21\par    Cycle 10 - 4/5/21- 4/9/21\par    C 12 this week\par \par S/p DLI #1 on 8/13/20\par S/P DLI #2 on 10/8/20\par S/p DLI #3 on 12/3/20\par \par 2) Heme\par ABO compatible transplant: pt and donor are both A pos\par Ongoing anemia and thrombocytopenia, poor graft function with dropping CD 33 chimerism \par Continue multivitamin and folic acid daily\par \par Hx of recurrent DVT/PE\par Factor V Leiden reportedly runs in the family \par 11/21/19 Doppler US of RUE negative for DVT\par Doppler US of BLEs on 7/2/20 negative for DVT\par Xarelto on hold while pancytopenic, to resume when PLT consistently >100K\par \par 3) ID\par Continue ppx:\par - Acyclovir 400 mg bid \par - Voriconazole 200 mg bid - hx of aspergillosis \par Mepron d/c'd 12/16/20 \par Post transplant crowd and dietary restrictions reviewed - in light of covid-19, [facility] recs reviewed and strict restrictions reinforced \par \par COVID Vaccine:\par Received Moderna COVID vaccine on 1/16/21 and 2/13/21\par \par \par CMV viremia\par CMV PCR peaked at 8,581 on 11/20/19\par Valcyte 450mg BID started 11/21/19. Decreased to QD as of 12/17/19. D/c'd 2/14/20 for ongoing thrombocytopenia \par 12/14/20 CMV PCR not detected \par 12/29/20 CMV PCR not detected\par 2/8/21 CMV PCR not detected\par Continue to monitor PCR at ever visit \par \par 4) GVHD\par Skin 2  Liver 0   GI 0 - overall grade 1.....\par cont topical steroid cream bid\par No signs of chronic GVHD at this time\par Off MMF as of 11/22/19 \par Tacrolimus discontinued on 6/15/20\par Prednisone discontinued on 6/15/20\par Reviewed signs/symptoms of GVHD (i.e. rash, mucositis, nausea/vomiting, diarrhea)\par \par aGVHD of skin \par Maculopapular pruritic rash of face, neck, and upper chest (BSA 19%) noted 1/21/20 in setting of tapering prednisone (for FTT) - max skin stage 1, overall grade 1\par 1/21/20: rash on face, neck, and chest (BSA 19%); skin 1, overall grade 1; prednisone increased to 10mg daily\par 2/14/20: rash on neck, chest, and bilateral upper arms (BSA 18%); skin 1, overall grade 1: prednisone increased to 20mg daily\par 2/21/20: rash on neck and chest (10% BSA); skin 1, overall grade 1. Continue prednisone 20mg daily\par 3/12/20: rash on neck and chest (10% BSA); skin 1, overall grade 1. Continue prednisone 15mg daily  \par 3/19/20: rash on neck and chest (10% BSA); skin 1, overall grade 1. Continue prednisone 15 mg daily\par 4/2/20: rash on neck and chest (10% BSA); skin 1, overall grade 1. Continue prednisone 15 mg daily\par 4/9/20: rash resolved, prednisone decreased to 10mg daily\par 4/17/20: rash on neck and chest (10% BSA); skin 1, overall grade 1. Continue prednisone 10mg daily and reinforced compliance with triamcinolone cream bid \par 5/8/20: waxing and waning pruritic erythema of upper back, unsure if GVHD. Continue prednisone 10mg daily at this time\par 5/14/20: mild erythema of neck and upper back ( 5% BSA); skin 1, overall grade 1. Continue prednisone 10 mg daily\par 5/22/20: faint pruritic erythema of upper chest and upper back (9% BSA); skin 1, overall grade 1. Prednisone decreased 5mg daily for drop in chimerism \par 6/5/20: monitor very faint erythema of chest and upper back. Continue triamcinolone 0.1% cream bid PRN rash \par Continue to monitor closely \par 6/23/20: Mild erythema of upper back and bilateral upper extremities.Continue triamcinolone 0.1% cream bid PRN rash \par 7/2/20: pruritic erythema of upper back, chest, and bilateral upper arms (36% BSA). Defer starting oral immunosuppression at this time given drop in chimerism. Start Claritin daily and Pepcid bid. Will attempt to attain insurance auth for Lidex 0.1% cream bid, continue to monitor closely\par 7/8/20: Pruritic erythema of chest and bilateral upper arms (18% BSA). Patient will pay out of pocket today for lidex cream. To be applied BID. Continue claritin and pepcid. \par 7/21/20: no rash \par 7/30/20: erythematous rash of chest (9% BSA), continue Lidex cream bid \par 8/27/20: Erythematous rash of chest and back ( 9% BSA). Continue lidex cream BID\par 9/8/20: erythematous rash of BUEs (18% BSA), continue Lidex cream bid \par 3/8/21: Mild erythematous rash of bilateral lower extremities and chest. BSA 22.5%. Recommended patient to apply bacitracin and hydrocortisone cream BID. Patient is aware to call office if rash worsens. \par 4/5/21, 5/4/21: Erythematous rash of bilateral upper and lower extremities, chest, back. Continue lidex cream to chest, back and upper extremities. Continue triamcinolone cream to B/L LE extremities as prescribed by dermatologist. 50%\par \par 3/17/21: Right and Left leg punch biopsy of legs\par Right leg- Lichenoid dermatitis with epidermal hyperplasia. Note: the favored diagnosis on histological grounds alone is hypertrophic lichen planus. However, given the clinical information provided, a hypertrophic manifestation of graft vs host disease and a lichenoid drug eruption remain in the differential diagnosis.\par Left Leg- Lichenoid dermatitis with epidermal hyperplasia. Note: the favored diagnosis on histological grounds alone in hypertropic lichen planus. However, given the clinical information provided, a hypertrophic manifestation of graft vs host disease and a lichenoid drug eruption remain in the differential diagnosis.\par \par Recently finished a 10 day course of Keflex prescribed on 3/15/21 for bilateral lower extremities.\par \par 5) GI\par Continue ppx:\par - Protonix 40 mg daily\par - Ursodiol 300 mg bid\par PRN Zofran and Reglan for nausea/vomiting\par \par 6) Cardiac\par HTN \par Metoprolol succinate 25 mg daily - discontinued by cardiology\par Continue meds as below\par Soft BP though asymptomatic\par \par Cardiomyopathy\par ECHO completed on  6/24/20, ordered by cardiology\par ECHO completed on 11/5/20 - Left ventricular ejection fraction of 20%\par Continue meds as prescribed:\par - Carvedilol 6.25 mg bid\par - Valsartan 40 mg daily HOLDING as per cardiology\par - Lasix 20 mg bid\par Continue f/u with cardiology \par \par Shortness of breath\par STAT chest xray completed on 11/4/20 - heart enlarged with small bilateral pleural effusions\par Increased lasix to 80 mg BID on 11/4/20 for two days. Then decrease lasix to 40 mg BID.\par Echo scheduled for 11/5/20. Patient and patients girlfriend aware patient needs to see Dr Lyn. \par Patient is aware he cannot have any  ETOH . Dr Gaytan met with patient and explained patient cannot drink ETOH. Contributing to fluid overload and heart failure. \par RESOLVED\par \par 7) Other\par BPH - continue finasteride 5mg daily and tamsulosin 0.4 mg daily \par Anxiety/Depression - continue Zoloft 50 mg daily\par Insomnia - continue PRN Xanax 0.5-1 mg qhs\par Pruritus - continue Pepcid 20 mg bid and Zyrtec 10 mg daily \par \par 8) Plan/Dispo\par Pt educated regarding plan of care, all questions/concerns addressed\par Continue f/u 4 weeks\par call if rash worsens...hope to see chimerism rise further....\par

## 2021-06-16 NOTE — PHYSICAL EXAM
[General Appearance - Well Developed] : well developed [Normal Appearance] : normal appearance [Well Groomed] : well groomed [General Appearance - Well Nourished] : well nourished [No Deformities] : no deformities [General Appearance - In No Acute Distress] : no acute distress [Normal Conjunctiva] : the conjunctiva exhibited no abnormalities [Eyelids - No Xanthelasma] : the eyelids demonstrated no xanthelasmas [No Oral Pallor] : no oral pallor [Normal Oral Mucosa] : normal oral mucosa [No Oral Cyanosis] : no oral cyanosis [Normal Jugular Venous A Waves Present] : normal jugular venous A waves present [Normal Jugular Venous V Waves Present] : normal jugular venous V waves present [No Jugular Venous Rosario A Waves] : no jugular venous rosario A waves [Respiration, Rhythm And Depth] : normal respiratory rhythm and effort [Exaggerated Use Of Accessory Muscles For Inspiration] : no accessory muscle use [Abdomen Soft] : soft [Abdomen Tenderness] : non-tender [Abdomen Mass (___ Cm)] : no abdominal mass palpated [Abnormal Walk] : normal gait [Gait - Sufficient For Exercise Testing] : the gait was sufficient for exercise testing [] : no rash [Skin Color & Pigmentation] : normal skin color and pigmentation [No Venous Stasis] : no venous stasis [Skin Lesions] : no skin lesions [No Skin Ulcers] : no skin ulcer [No Xanthoma] : no  xanthoma was observed [Oriented To Time, Place, And Person] : oriented to person, place, and time [Affect] : the affect was normal [Mood] : the mood was normal [No Anxiety] : not feeling anxious [5th Left ICS - MCL] : palpated at the 5th LICS in the midclavicular line [Normal] : normal [No Precordial Heave] : no precordial heave was noted [Normal Rate] : normal [Heart Rate ___] : [unfilled] bpm [Normal S1] : normal S1 [Rhythm Regular] : regular [Normal S2] : normal S2 [No Gallop] : no gallop heard [No Murmur] : no murmurs heard [2+] : left 2+ [1+] : right 1+ [No Abnormalities] : the abdominal aorta was not enlarged and no bruit was heard [___ +] : bilateral [unfilled]U+ pitting edema to the ankles [FreeTextEntry1] : s/p right big toe amputation [Apical Thrill] : no thrill palpable at the apex [S3] : no S3 [S4] : no S4 [Click] : no click [Pericardial Rub] : no pericardial rub [Right Carotid Bruit] : no bruit heard over the right carotid [Right Femoral Bruit] : no bruit heard over the right femoral artery [Rt] : no varicose veins of the right leg [Lt] : no varicose veins of the left leg

## 2021-06-16 NOTE — END OF VISIT
Patient [Time Spent: ___ minutes] : I have spent [unfilled] minutes of time on the encounter. [>50% of the face to face encounter time was spent on counseling and/or coordination of care for ___] : Greater than 50% of the face to face encounter time was spent on counseling and/or coordination of care for [unfilled]

## 2021-06-16 NOTE — REVIEW OF SYSTEMS
[Feeling Fatigued] : feeling fatigued [Dyspnea on exertion] : dyspnea during exertion [Lower Ext Edema] : lower extremity edema [Negative] : Genitourinary [Joint Pain] : no joint pain [Rash] : no rash [Dizziness] : no dizziness [Depression] : no depression [Anxiety] : no anxiety [Easy Bleeding] : no tendency for easy bleeding [Easy Bruising] : no tendency for easy bruising

## 2021-06-16 NOTE — HISTORY OF PRESENT ILLNESS
[FreeTextEntry1] : Young presents for evaluation of cardiomyopathy. He has had a complicated medical history and is now s/p bone marrow transplant. He has been treated for AML including consolidation therapy in July of last year. He developed osteomyelitis of the right toe for which he underwent amputation. He has a known cardiomyopathy but had no cardiac complications throughout his hospitalization. He did have neutropenic fevers but no heart failure, dysrhythmias, or other cardiac problems. He is feeling well today and has no specific complaints.\par \par Since 7/19 LV systolic function has been deteriorating. LVEF from 40% to 20%. MR scan of the heart 9/19 demonstrated ejection fraction of 24%. Recently the patient has developed increasing water retention with weight gain, increasing shortness of breath. His Lasix was doubled and he has lost about 6 pounds of water weight.\par \par His medicines have been limited by hypotension.  He is now taking Lasix 20 mg twice a day, and carvedilol 3.1.25 mg twice a day.  Not able to take the valsartan because of hypotension.\par \par Past medical is remarkable for factor V Leiden mutation known in the family, history of DVT, pulmonary embolism, hypertension, tachycardia\par \par Social history is remarkable for smoking for approximately 45 years, stopped last year.  He has worked as a . The patient had been drinking alcohol which he stopped and unfortunately is having a high salt diet\par \par Family history is notable for both parents dying together in an accident. His father had successful bypass surgery at an older age\par \par I discussed his case with both electrophysiologist and the oncologist. Because of his present blood counts he is not considered an ideal candidate for any device or intervention. We are treating him medically.\par \par Blood work 6/21 demonstrated globin of 10.6 with hematocrit 34.9.  Creatinine 1.01 with a potassium 4.5.  Glucose 150.\par \par The patient has not been on a good diet and eating a lot of foods with high salt.  As result he increasing bilateral peripheral edema.  Is now on furosemide 60 mg twice a day.  He has been short of breath with the high dose of furosemide his breathing has improved.  He is now taking the Coreg twice a day and his heart rate has improved.

## 2021-06-16 NOTE — DISCUSSION/SUMMARY
[FreeTextEntry1] : Patient has increasing volume overload, probably on the basis of poor diet.  We discussed the importance of a low-salt diet.  I am going to try him on torsemide in place of the furosemide.  Take 40 mg in the morning and 20 mg in the early afternoon.  We also discussed the importance of leg elevation and support stockings.  Hopefully as he is on a better diet we can cut back on the diuretic.  He will go for [facility] to check his electrolytes and kidney function.  He should stay on the Coreg twice a day.\par \par If there is any problems he would call me.  Otherwise I will see him in 1 month to see what progress he has made.

## 2021-06-16 NOTE — CARDIOLOGY SUMMARY
[LVEF ___%] : LVEF [unfilled]% [Severe] : severe LV dysfunction [None] : no mitral regurgitation [Normal] : normal [___] : [unfilled]

## 2021-06-21 NOTE — PROVIDER CONTACT NOTE (CRITICAL VALUE NOTIFICATION) - BACKGROUND
2021    RE: Laurel Santoyo  [address] [facility] [id number]     PEDIATRIC INFECTIOUS DISEASES  [facility]  [address], [address]th [place] [address]  Office: [phone]  Fax: [phone]     Date: 2021     To: Misty Abbasi MD  [address] [place] [address]    Pt: Laurel Santoyo  MR: [id number]  : 2002  FAIZA: 2021     Dear Dr. Abbasi     I had the pleasure of seeing Laurel Santoyo at the Pediatric Infectious Diseases Clinic at the [facility]. 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 She presents today to establish care for monitoring of her EBV infection. She has previously seen my colleague Dr. Jd Stevens from heme/onc (3/23/21). Laurel has taken charge of her care over the past few years and is now fully managing her medications and appointments.      Laurel was EBV seronegative post-transplant until 16 when she was first noted to have detectable EBV capsid IgM and remained capsid IgM positive through 19 when she was first noted to have detectable whole blood EBV PCR level of 138K. She did not seroconvert to EBV capsid IgG positive until 3/25/20. In the weeks prior to her first detectable EBV PCR level, Laurel was diagnosed with pneumonia and otitis media. She was also noted at that time to have tonsillar exudates and moderate cervical lymphadenopathy on exam consistent with a mono-like syndrome. These symptoms have all subsequently resolved. Of note, as of 20, she has now also seroconverted to EBNA IgG positive.     Her whole blood EBV PCR level has remained over 100K persistently for the last year with a peak of 549K on 20. Additionally, her plasma EBV PCR has shown low, but quantifiable copies consistently since  9/2/20 suggesting some degree of lytic viral replication. Despite her persistent whole blood EBV PCR level and evidence of lytic replication, she has not had any other symptoms concerning for development of PTLD. She denies unexplained fevers, ill symptoms such as runny nose, sore throat, or cough, abdominal pain, unintentional weight loss, GI upset, nausea, vomiting, or  unusual lumps or bumps. She does have chronic fatigue that waxes and wanes which has been continuing to follow the usual pattern of worsening when she is more depressed.      Problem list:   Patient Active Problem List   Diagnosis     IMMUNIZATION HISTORY     **Need for SBE (subacute bacterial endocarditis) prophylaxis     Heart replaced by transplant (H)     Vaccine contraindicated due to immunosuppression - NO LIVE VACCINES/ **NO MMR, NO VARICELLA, NO LIVE INFLUENZA**        ROS: 10 point ROS neg other than the symptoms noted above in the HPI.     Past Medical History:   Diagnosis Date     Heart transplant, orthotopic, status  2/2004 8/14/2012     HLHS (hypoplastic left heart syndrome) 8/22/2007     HLHS (hypoplastic left heart syndrome) 8/22/2007    Hx of Heart Transplant in 2/2004 at age 1 years Nov, 2008 sub-aortic membrane removal      PONV (postoperative nausea and vomiting)      Post-operative aortic arch obstruction 7/12/2011     s/p heart transplant 2/2004 7/12/2011       Past Surgical History:   Procedure Laterality Date     ANGIOGRAPHY  8/14/2012    Procedure: ANGIOGRAPHY;;  Surgeon: Melanie Arias MD;  Location: [facility] OR     C TRANSPLANTATION OF HEART  2/2004     HEART CATH CHILD  7/11/2011    Procedure:HEART CATH CHILD; Right Left, Biopsy and Possible Coarct Stent; Surgeon:MELANIE ARIAS; Location:[facility] OR     HEART CATH CHILD  8/14/2012    Procedure: HEART CATH CHILD;  Left Heart Cath, Coronary Angiogram with Possible Balloon Dilatation of Aorta;  Surgeon: Melanie Arias MD;  Location: [facility] OR     INSERT  PICC LINE  11/7/2012    Procedure: INSERT PICC LINE;  Picc Line Placement;  Surgeon: ANNA Bailey MD;  Location: [facility] OR       Family History   Problem Relation Age of Onset     Other - See Comments Maternal Grandfather         mental health     Other - See Comments Mother 18        migraine headaches       Social History     Tobacco Use     Smoking status: Never Smoker     Smokeless tobacco: Never Used   Substance Use Topics     Alcohol use: No     Immunization:   Immunization History   Administered Date(s) Administered     COVID-19,PF,Dom 03/10/2021     Comvax (HIB/HepB) 2000, 07/02/2001     DTAP (<7y) 2000, 2000, 07/19/2005, 08/22/2007     HEPA 09/02/2014     HPV 08/30/2013, 10/17/2013, 09/02/2014     HepA-ped 2 Dose 04/25/2018     HepB 08/22/2007     Influenza (H1N1) 10/21/2009, 11/25/2009     Influenza (IIV3) PF 2000, 10/24/2002, 10/09/2003, 10/06/2004, 11/16/2006, 10/19/2007, 10/15/2008, 10/11/2010, 10/03/2011, 10/19/2012     Influenza Vaccine IM 18-49 Yrs, RIV3 10/08/2019     Influenza Vaccine IM > 6 months Valent IIV4 10/17/2013, 10/17/2014, 10/19/2015, 10/21/2016, 11/23/2018, 01/04/2021     Influenza vaccine ages 6-35 months 10/15/2009     MMR 10/11/2001, 07/19/2005     Meningococcal (Bexsero ) 03/04/2019     Meningococcal (Menactra ) 08/30/2013, 04/24/2017     Pneumo Conj 13-V (2010&after) 03/04/2019     Pneumococcal (PCV 7) 2000, 2000, 07/02/2001     Pneumococcal 23 valent 04/24/2017     Poliovirus, inactivated (IPV) 2000, 2000, 07/19/2005, 08/22/2007     Synagis 10/11/2001, 10/09/2003, 11/04/2003, 12/03/2003, 12/30/2003, 01/26/2004, 04/06/2004, 11/30/2004, 12/28/2004, 03/10/2005, 04/06/2005     TDAP Vaccine (Boostrix) 07/09/2012     Varicella 10/11/2001     Allergies:  No Known Allergies     Current Outpatient Medications   Medication Sig Dispense Refill     amoxicillin (AMOXIL) 500 MG capsule TAKE 4 CAPSULES BY MOUTH ONCE FOR 1 DOSE, 30-60 MINUTES  PRIOR TO DENTAL CLEANING 4 capsule 0     aspirin EC 81 MG EC tablet Take 1 tablet by mouth daily. 30 tablet 6     biotin 1000 MCG TABS tablet Take 2,000 mcg by mouth daily       lisinopril (ZESTRIL) 10 MG tablet Take 1 tablet (10 mg) by mouth daily 90 tablet 3     Magnesium 400 MG TABS Take 400 mg by mouth daily 30 tablet 3     simvastatin (ZOCOR) 5 MG tablet Take 1 tablet (5 mg) by mouth every evening 90 tablet 3     tacrolimus (GENERIC EQUIVALENT) 0.5 MG capsule Take 4 capsules (2 mg) by mouth 2 times daily Take 2 mg (4 capsules) twice daily 720 capsule 3     vitamin D3 (CHOLECALCIFEROL) 2000 units (50 mcg) tablet Take 1 tablet (2,000 Units) by mouth daily 90 tablet 3       Physical Exam   GENERAL: Active, alert, in no acute distress.  SKIN: Clear. No significant rash, abnormal pigmentation or lesions  HEAD: Normocephalic  EYES: Pupils equal, round, reactive, Extraocular muscles intact. Normal conjunctivae.  NOSE: Normal without discharge.  MOUTH/THROAT: Clear. No oral lesions. Teeth without obvious abnormalities.  NECK: Supple, no masses.  No thyromegaly.  LYMPH NODES: No adenopathy  LUNGS: Clear. No rales, rhonchi, wheezing or retractions  HEART: Regular rhythm. Normal S1/S2. No murmurs. Normal pulses.  ABDOMEN: Soft, non-tender, not distended, no masses or hepatosplenomegaly. Bowel sounds normal.   NEUROLOGIC: No focal findings. Cranial nerves grossly intact: DTR's normal. Normal gait, strength and tone  BACK: Spine is straight, no scoliosis.  EXTREMITIES: Full range of motion, no deformities       Lab:  No results found for any visits on 06/21/21.     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 Her primary EBV infection occurred in 2019. She continues to have low level lytic replication at least as recently as 3/12/21 when it was last checked. Her whole blood EBV DNA titers have been stable without a large  interval increase (mid-log 5). She has not developed a durable positive titer for EBNA to date. Overall I would characterize her EBV DNA as moderately elevated, predominantly latent, with full establishment of latency not yet achieved (which is not concerning at this time in the absence of concerning symptoms).       Plan:     1. Continue monitoring EBV DNA PCR every three months: for the next measurement in September I would be in favor of doing a plasma level.     2. Please include anti-EBNA in next lab draw.    3. Follow up with me in three months to review new labs and to conduct symptomatic screening for PTLD.    Follow up: I would like to see Laurel 3 months. If symptoms reoccur or any new issues arise I would be happy to see her earlier in clinic.     I have reviewed Laurel s past medical history, family history, social history, medications and allergies as documented in the medical record. There were no additional findings except as noted.    I spent a total of 45 minutes face-to-face with Laurel Santoyo during today s office visit.  Over 50% of this time was spent counseling the patient and/or coordinating care on the same day of her clinic appointment.    Sincerely,     Mark Restrepo MD, PhD  Division of Pediatric Infectious Diseases  [facility]  [facility]  Clinic Coordinator: Sondra Mejias: [phone]  [phone]  Fax: [phone]      21 days post haplo transplant

## 2021-07-04 PROBLEM — Z78.9 CURRENT NON-SMOKER: Status: ACTIVE | Noted: 2019-11-08

## 2021-07-04 NOTE — HISTORY OF PRESENT ILLNESS
[de-identified] : Mr. Delong is a 58 y/o male, with a previously diagnosed acute myeloid leukemia. A bone marrow biopsy from 7/3 revealed Acute myeloid leukemia (AML). FISH - report shows a population of aberrant myeloid blasts. He is currently being treated by Dr. Coelho for high risk AML with HIDAC consolidation chemotherapy. He was referred by Dr. Coelho for an initial consultation of a Haplo- transplant. \par \par The patient has a history of multiple blood clots - factor V Leiden runs in the family. He was diagnosed with sleep apnea, but refuses to use CPAP. He is a former smoker, 40 years. He also has a past medical history of PE, HTN, cardiomyopathy. He recently underwent an amputation of the right first toe due to an infection.\par \par S/p hospitalization at [facility] 10/3/19 - 11/1/19 for haplo (son) SCT. \par Upon admission, a TLC was placed in IR. Mr. Delong received IV fluid hydration, pain management, nutritional support, anxiolysis, antiemetics, and antiviral,  antifungal, antibacterial, GI, PCP and VOD prophylaxis. When his ANC dropped  below 1000, he was started on prophylactic Cefepime. Labs were monitored on a daily basis and he received transfusional support and electrolyte repletion as needed. \par \par While in patient, Mr. Delong was continued on his Voriconazole for history of aspergillosis. \par \par S/p hospitalization at [facility] 10/3/19 - 11/1/19 for haplo (son) SCT. \par Upon admission, a TLC was placed in IR. Mr. Delong received IV fluid hydration, pain management, nutritional support, anxiolysis, antiemetics, and antiviral, antifungal, antibacterial, GI, PCP and VOD prophylaxis. When his ANC dropped below 1000, he was started on prophylactic Cefepime. Labs were monitored on a daily basis and he received transfusional support and electrolyte repletion as needed. \par \par While in patient, Mr. Delong was continued on his Voriconazole for history of aspergillosis. \par \par During admission, Mr. Delong had pancytopenia related to the high dose chemotherapy prep regimen. He also experienced neutropenic fevers. When he became febrile, blood and urine cultures were sent and a CXR was completed which were unremarkable. \par \par On 10/9/2019, following premedications, pt received 250 ml of allogeneic, related, haplo, fresh, mobilized HPC apheresis over 30-60 minutes. \par Cell counts as follows: \par \par Total MNCs (x10^8/kg) = 5.17 \par CD 34 cells (x10^6/kg) = 7.34 \par CD 3 Cells (x 10^7/kg) = 19.81 \par Cell viability (%) = 100 \par \par Pt tolerated infusion without any adverse reaction or complications. \par \par Engraftment was noted on 10/28/2019. Daily Zarxio was discontinued, as was Cefepime given negative cultures. Mr. Delong was discharged home to f/u at the [facility].  [de-identified] : On 12/16/20 visit, presents for Vidaza this week 12/14-12/19. Overall feeling OK. Ongoing fatigue. Stable SOB with exertion. Reports adequate PO intake and hydration. Drinking 1 glass of wine per week. Intermittent pruritic rash of upper back, intermittently using steroid cream. Trace BLE edema waxes and wanes, currently taking Lastix 40mg bid. Compliant with post transplant meds and restrictions. Denies fever, chills, headache, dizziness, blurred vision, mucositis/odynophagia, chest pain, cough, nausea/vomiting, diarrhea/constipation, abdominal pain, dysuria, bleeding, pain\par \par On 12/29/20, patient presents for a follow up visit. Overall Young is well and offers no acute concerns. Mild erythema of bilateral antecubital fossa. Denies fever, chills, nausea, vomiting, diarrhea, rash, mouth sores, dysuria or any signs of active bleeding. Denies SOB or chest pain. Mild edema of bilateral lower extremities. Remains compliant with medications as prescribed. \par \par On 1/13/21, day + 462 post stem cell transplant. Overall patient is well and offers no acute concerns. Patient is receiving cycle 7 of vidaza this week. Continues to have mild erythema of bilateral antecubital fossa but, admits does not apply hydrocortisone cream BID. Denies fever, chills, nausea, vomiting, diarrhea, rash, mouth sores, dysuria or any signs of active bleeding. Denies SOB or chest pain. Mild edema of bilateral lower extremities. Remains compliant with medications as prescribed. \par \par 1/27/21 Pt doing well...no new issues....tolerating vidazza...\par \par On 3/8/21, patient presents for a follow up visit. Young complains of an erythematous rash of bilateral lower extremities and chest. Patient is receiving cycle 9 of vidaza this week. Denies fever, chills, nausea, vomiting, diarrhea, mouth sores, dysuria or any signs of active bleeding. Denies SOB, chest pain or B/L LE edema. Remains compliant with post transplant medications. \par \par On 4/5/21, patient presents for a follow up visit. Young has an erythematous rash of bilateral upper and lower extremities, chest and back. Went to dermatology recently and had  punch biopsies of bilateral lower extremities. Has been applying lidex cream and triamcinolone cream. States does not have an appetite. Denies fever, chills, nausea, vomiting, diarrhea, mouth sores, dysuria or any signs of active bleeding. Denies SOB, chest pain or B/L LE edema. \par \par Today here for f/u..looks well overall...Ashlyn is on the phone..rash is waxing and waning...compliant with creams...no n/v/d..skin bx c/w GVHD..overall improved

## 2021-07-04 NOTE — ASSESSMENT
[FreeTextEntry1] : Young Delong is a 62 y/o male with high risk AML s/p haplo SCT on 10/9/19 with the following comorbidities being managed:\par \par 1) AML\par S/p haplo (son) PSCT on 10/9/19\par 6/5/20 chimerism = 74.3% donor\par 6/23/20 chimerism = 67.7% donor (CD33 = 30% and CD3 = 100% donor)\par 6/25/20 BMbx: VINCE, normal male karyotype \par 7/8/20 chimerism = 57.7% donor (CD3 = 99% and CD33 = 10% donor)\par 7/21/20 chimerism = 59.3% donor (CD3 = 98.3% and CD33 = 7.3% donor)\par 8/13/20 chimerism = 56.7 % donor\par 9/2020 chimerism = 53%\par 9/24/20 chimerism = 55% donor\par 10/8/20 chimerism = 53%\par 10/22/20 chimerism = 38.3 % donor (CD 3 = 99% and CD 33= 1% donor)\par 11/4/20 chimerism = 51.3 % donor (CD 3 = 99% and CD 33= 3% donor)\par 12/3/20 chimerism = 41% donor (CD3 = 99% and CD33 = 1% donor)\par 12/14/20 chimerism= 36.7 % donor ( CD 3= 99% and CD 33=0.3% donor)\par 12/29/20 chimerism= 36.3 % donor ( CD 3= 99% and CD 33= 0.3% donor)\par 1/15/21 36%\par 2/8/21 chimerism= 30% donor ( CD 3 = 98% and CD 33= 0% donor)\par 3/8/21 chimerism= 28.3 % donor ( CD 3= 99% and CD 33=1% donor)\par 4/2021 39% donor...last one on whole bloodbelow 30....cd 3 100...cd33 0\par Current disease status: VINCE based on 6/25/20 BMbx and ongoing peripheral blood work.\par \par In setting of dropping chimerism, SQ Vidaza 32mg/m2 daily x 5 days every 28 days started 7/27/20 \par    Cycle 2 - 8/24-8/28 \par    Cycle 3 - 9/21-9/25\par    Cycle 4 - 10/19 - 10/23\par    Cycle 5 - 11/16 - 11/20\par    Cycle 6 - 12/14 -12/19\par    Cycle 7- 1/11/21- 1/15/21\par    Cycle 8- 2/8/21- 2/12/21\par     Cycle 9- 3/8/21- 3/12/21\par    Cycle 10 - 4/5/21- 4/9/21\par    C 12 this week\par \par S/p DLI #1 on 8/13/20\par S/P DLI #2 on 10/8/20\par S/p DLI #3 on 12/3/20\par \par 2) Heme\par ABO compatible transplant: pt and donor are both A pos\par Ongoing anemia and thrombocytopenia, poor graft function with dropping CD 33 chimerism \par Continue multivitamin and folic acid daily\par \par Hx of recurrent DVT/PE\par Factor V Leiden reportedly runs in the family \par 11/21/19 Doppler US of RUE negative for DVT\par Doppler US of BLEs on 7/2/20 negative for DVT\par Xarelto on hold while pancytopenic, to resume when PLT consistently >100K\par \par 3) ID\par Continue ppx:\par - Acyclovir 400 mg bid \par - Voriconazole 200 mg bid - hx of aspergillosis \par Mepron d/c'd 12/16/20 \par Post transplant crowd and dietary restrictions reviewed - in light of covid-19, CDC recs reviewed and strict restrictions reinforced \par \par COVID Vaccine:\par Received Moderna COVID vaccine on 1/16/21 and 2/13/21\par \par \par CMV viremia\par CMV PCR peaked at 8,581 on 11/20/19\par Valcyte 450mg BID started 11/21/19. Decreased to QD as of 12/17/19. D/c'd 2/14/20 for ongoing thrombocytopenia \par 12/14/20 CMV PCR not detected \par 12/29/20 CMV PCR not detected\par 2/8/21 CMV PCR not detected\par Continue to monitor PCR at ever visit \par \par 4) GVHD\par Skin 0  Liver 0   GI 0 - overall grade 0.....\par cont topical steroid cream prn\par No signs of chronic GVHD at this time\par Off MMF as of 11/22/19 \par Tacrolimus discontinued on 6/15/20\par Prednisone discontinued on 6/15/20\par Reviewed signs/symptoms of GVHD (i.e. rash, mucositis, nausea/vomiting, diarrhea)\par \par aGVHD of skin \par Maculopapular pruritic rash of face, neck, and upper chest (BSA 19%) noted 1/21/20 in setting of tapering prednisone (for FTT) - max skin stage 1, overall grade 1\par 1/21/20: rash on face, neck, and chest (BSA 19%); skin 1, overall grade 1; prednisone increased to 10mg daily\par 2/14/20: rash on neck, chest, and bilateral upper arms (BSA 18%); skin 1, overall grade 1: prednisone increased to 20mg daily\par 2/21/20: rash on neck and chest (10% BSA); skin 1, overall grade 1. Continue prednisone 20mg daily\par 3/12/20: rash on neck and chest (10% BSA); skin 1, overall grade 1. Continue prednisone 15mg daily  \par 3/19/20: rash on neck and chest (10% BSA); skin 1, overall grade 1. Continue prednisone 15 mg daily\par 4/2/20: rash on neck and chest (10% BSA); skin 1, overall grade 1. Continue prednisone 15 mg daily\par 4/9/20: rash resolved, prednisone decreased to 10mg daily\par 4/17/20: rash on neck and chest (10% BSA); skin 1, overall grade 1. Continue prednisone 10mg daily and reinforced compliance with triamcinolone cream bid \par 5/8/20: waxing and waning pruritic erythema of upper back, unsure if GVHD. Continue prednisone 10mg daily at this time\par 5/14/20: mild erythema of neck and upper back ( 5% BSA); skin 1, overall grade 1. Continue prednisone 10 mg daily\par 5/22/20: faint pruritic erythema of upper chest and upper back (9% BSA); skin 1, overall grade 1. Prednisone decreased 5mg daily for drop in chimerism \par 6/5/20: monitor very faint erythema of chest and upper back. Continue triamcinolone 0.1% cream bid PRN rash \par Continue to monitor closely \par 6/23/20: Mild erythema of upper back and bilateral upper extremities.Continue triamcinolone 0.1% cream bid PRN rash \par 7/2/20: pruritic erythema of upper back, chest, and bilateral upper arms (36% BSA). Defer starting oral immunosuppression at this time given drop in chimerism. Start Claritin daily and Pepcid bid. Will attempt to attain insurance auth for Lidex 0.1% cream bid, continue to monitor closely\par 7/8/20: Pruritic erythema of chest and bilateral upper arms (18% BSA). Patient will pay out of pocket today for lidex cream. To be applied BID. Continue claritin and pepcid. \par 7/21/20: no rash \par 7/30/20: erythematous rash of chest (9% BSA), continue Lidex cream bid \par 8/27/20: Erythematous rash of chest and back ( 9% BSA). Continue lidex cream BID\par 9/8/20: erythematous rash of BUEs (18% BSA), continue Lidex cream bid \par 3/8/21: Mild erythematous rash of bilateral lower extremities and chest. BSA 22.5%. Recommended patient to apply bacitracin and hydrocortisone cream BID. Patient is aware to call office if rash worsens. \par 4/5/21, 5/4/21: Erythematous rash of bilateral upper and lower extremities, chest, back. Continue lidex cream to chest, back and upper extremities. Continue triamcinolone cream to B/L LE extremities as prescribed by dermatologist. 50%\par \par 3/17/21: Right and Left leg punch biopsy of legs\par Right leg- Lichenoid dermatitis with epidermal hyperplasia. Note: the favored diagnosis on histological grounds alone is hypertrophic lichen planus. However, given the clinical information provided, a hypertrophic manifestation of graft vs host disease and a lichenoid drug eruption remain in the differential diagnosis.\par Left Leg- Lichenoid dermatitis with epidermal hyperplasia. Note: the favored diagnosis on histological grounds alone in hypertropic lichen planus. However, given the clinical information provided, a hypertrophic manifestation of graft vs host disease and a lichenoid drug eruption remain in the differential diagnosis.\par \par Recently finished a 10 day course of Keflex prescribed on 3/15/21 for bilateral lower extremities.\par \par 5) GI\par Continue ppx:\par - Protonix 40 mg daily\par - Ursodiol 300 mg bid\par PRN Zofran and Reglan for nausea/vomiting\par \par 6) Cardiac\par HTN \par Metoprolol succinate 25 mg daily - discontinued by cardiology\par Continue meds as below\par Soft BP though asymptomatic\par \par Cardiomyopathy\par ECHO completed on  6/24/20, ordered by cardiology\par ECHO completed on 11/5/20 - Left ventricular ejection fraction of 20%\par Continue meds as prescribed:\par - Carvedilol 6.25 mg bid\par - Valsartan 40 mg daily HOLDING as per cardiology\par - Lasix 20 mg bid\par Continue f/u with cardiology \par \par Shortness of breath\par STAT chest xray completed on 11/4/20 - heart enlarged with small bilateral pleural effusions\par Increased lasix to 80 mg BID on 11/4/20 for two days. Then decrease lasix to 40 mg BID.\par Echo scheduled for 11/5/20. Patient and patients girlfriend aware patient needs to see Dr Lyn. \par Patient is aware he cannot have any  ETOH . met with patient and explained patient cannot drink ETOH. Contributing to fluid overload and heart failure. \par RESOLVED\par \par 7) Other\par BPH - continue finasteride 5mg daily and tamsulosin 0.4 mg daily \par Anxiety/Depression - continue Zoloft 50 mg daily\par Insomnia - continue PRN Xanax 0.5-1 mg qhs\par Pruritus - continue Pepcid 20 mg bid and Zyrtec 10 mg daily \par \par 8) Plan/Dispo\par Pt educated regarding plan of care, all questions/concerns addressed\par Continue f/u 4 weeks\par call if rash worsens...hope to see chimerism rise further....consider subsequent dli??\par

## 2021-07-04 NOTE — PHYSICAL EXAM
[Ambulatory and capable of all self care but unable to carry out any work activities] : Status 2- Ambulatory and capable of all self care but unable to carry out any work activities. Up and about more than 50% of waking hours [Normal] : affect appropriate [No active (erythematous_ GVHD rash)] : Skin: No active (erythematous_ GVHD rash) [< 2 mg/dl] : Liver: < 2 mg/dl [<500 ml/day or < 3 episodes/day] : Lower GI (stool output/day): <500 ml/day or <3 episodes/day [No or intermittent] : Upper GI: No or intermittent nausea, vomiting or anorexia [I] : I [No] : No [Total % ____] : Total: [unfilled]% [de-identified] : +BS; abdomen soft, non-distended, non-tender [de-identified] : Erythematous rash of bilateral upper and lower extremities, chest, back resolved...now hyperpigmented [OnsetofaGVHD] : 3/8/21 [FreeTextEntry1] : 10/9/19

## 2021-07-04 NOTE — REVIEW OF SYSTEMS
[Negative] : Allergic/Immunologic [Chest Pain] : no chest pain [Leg Claudication] : no intermittent leg claudication [Palpitations] : no palpitations [Lower Ext Edema] : no lower extremity edema [Skin Rash] : no skin rash [Skin Wound] : no skin wound [FreeTextEntry7] : No appetite [de-identified] : Erythematous rash of bilateral upper and lower extremities, chest, back...resolved..now hyperpigmented

## 2021-07-07 PROBLEM — R21 RASH: Status: ACTIVE | Noted: 2020-01-21

## 2021-07-14 PROBLEM — D89.813: Status: ACTIVE | Noted: 2020-01-21

## 2021-07-14 PROBLEM — I87.2 VENOUS STASIS DERMATITIS OF BOTH LOWER EXTREMITIES: Status: ACTIVE | Noted: 2021-01-01

## 2021-07-14 NOTE — ASSESSMENT
[External notes review: [ enter provider(s) name(s) ] :____] : As part of my evaluation, I have reviewed prior clinical note(s) from provider(s) outside of my group practice. 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 \par 3/17/21: Outside dermatologist Dr. Maverick Wu ([facility] [phone]) - Right and Left leg punch biopsy of legs both showed Lichenoid dermatitis with epidermal hyperplasia. Note: the favored diagnosis on histological grounds alone is hypertrophic lichen planus. However, given the clinical information provided, a hypertrophic manifestation of graft vs host disease and a lichenoid drug eruption remain in the differential diagnosis.\par 4/1/21: Outside dermatologist Dr. Tristin Herman ([facility] [phone]) - L thigh biopsy showed interface and perivascular derm with eos, consistent with GVHD\par \par - Continue Amlactin 12% cream BID to whole body\par c/w compression stockings. Reviewed importance of being compliant with recommendations regarding fluid management\par Keep legs elevated when able\par OK to use TAC ointment BID x 2 weeks PRN itch\par \par RTC 4 weeks

## 2021-07-14 NOTE — PHYSICAL EXAM
[Alert] : alert [Oriented x 3] : ~L oriented x 3 [Well Nourished] : well nourished [Conjunctiva Non-injected] : conjunctiva non-injected [Declined] : declined [FreeTextEntry3] : Hyperpigmented patches on the back, upper chest and arms\par \par BLE with 3-4+ pitting edema and overlying pink patches with scale. few superficial erosions with yellow crust on L lower leg/ankle\par

## 2021-07-14 NOTE — HISTORY OF PRESENT ILLNESS
[FreeTextEntry1] : rash [de-identified] : Mr. 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 Maverick Wu ([facility] [phone]) - Right and Left leg punch biopsy of legs both showed Lichenoid dermatitis with epidermal hyperplasia. Note: the favored diagnosis on histological grounds alone is hypertrophic lichen planus. However, given the clinical information provided, a hypertrophic manifestation of graft vs host disease and a lichenoid drug eruption remain in the differential diagnosis.\par - 4/1/21: Outside dermatologist Dr. Tristin Herman ([facility] [phone]) - L thigh biopsy showed interface and perivascular derm with eos, consistent with GVHD\par Modifying factors/Treatments: applying amlactin cream, fluocinonide cream BID (arms, torso), triamcinolone ointment BID (legs), benadryl cream. \par -Since LV with Dr. Vasques, notes improvement with application of triamcinolone. No new areas. Not wearing compression. Not compliant with management of CHF\par \par No other changing or concerning lesions. \par No itchy, growing, bleeding, painful, or changing moles. \par \par Derm Hx: aGVHD (2020)\par Personal hx of skin cancer: no\par FHx of skin cancer: father ?\par Social Hx: retired\par \par

## 2021-07-20 NOTE — HISTORY OF PRESENT ILLNESS
[FreeTextEntry1] : Young presents for evaluation of cardiomyopathy. He has had a complicated medical history and is now s/p bone marrow transplant. He has been treated for AML including consolidation therapy in July of last year. He developed osteomyelitis of the right toe for which he underwent amputation. He has a known cardiomyopathy but had no cardiac complications throughout his hospitalization. He did have neutropenic fevers but no heart failure, dysrhythmias, or other cardiac problems. He is feeling well today and has no specific complaints.\par \par Since 7/19 LV systolic function has been deteriorating. LVEF from 40% to 20%. MR scan of the heart 9/19 demonstrated ejection fraction of 24%. Recently the patient has developed increasing water retention with weight gain, increasing shortness of breath. His Lasix was doubled and he has lost about 6 pounds of water weight.\par \par His medicines have been limited by hypotension.  He is now taking Lasix 20 mg twice a day, and carvedilol 3.1.25 mg twice a day.  Not able to take the valsartan because of hypotension.\par \par Past medical is remarkable for factor V Leiden mutation known in the family, history of DVT, pulmonary embolism, hypertension, tachycardia\par \par Social history is remarkable for smoking for approximately 45 years, stopped last year.  He has worked as a . The patient had been drinking alcohol which he stopped and unfortunately is having a high salt diet\par \par Family history is notable for both parents dying together in an accident. His father had successful bypass surgery at an older age\par \par I discussed his case with both electrophysiologist and the oncologist. Because of his present blood counts he is not considered an ideal candidate for any device or intervention. We are treating him medically.\par \par Blood work 6/21 demonstrated hemoglobin of 11.6 with hematocrit 35.6.  Creatinine 1.13 with a potassium 4.0.  \par \par Last visit I changed him from furosemide to torsemide.  He also is doing a little bit better with his diet.  Hopefully less salt and less alcohol.  He is having no symptoms of shortness of breath.

## 2021-07-20 NOTE — DISCUSSION/SUMMARY
[FreeTextEntry1] : Clinically the patient is doing well.  He has no shortness of breath.  His lungs are clear he has 1-2+ ankle edema.  Blood pressure is borderline low and his heart rate is a little fast.  He is tolerating his medication.  Blood work is stable with stable electrolytes and CBC.\par \par He will stay on his present medication.  We did discuss lifestyle including diet, exercise, and weight control.  If he has any problems or questions he would call me.  If all is well I will see him in 2 months.

## 2021-07-20 NOTE — PHYSICAL EXAM
[General Appearance - Well Developed] : well developed [Normal Appearance] : normal appearance [Well Groomed] : well groomed [General Appearance - Well Nourished] : well nourished [No Deformities] : no deformities [General Appearance - In No Acute Distress] : no acute distress [Normal Conjunctiva] : the conjunctiva exhibited no abnormalities [Eyelids - No Xanthelasma] : the eyelids demonstrated no xanthelasmas [Normal Oral Mucosa] : normal oral mucosa [No Oral Pallor] : no oral pallor [No Oral Cyanosis] : no oral cyanosis [Normal Jugular Venous A Waves Present] : normal jugular venous A waves present [Normal Jugular Venous V Waves Present] : normal jugular venous V waves present [No Jugular Venous Rosario A Waves] : no jugular venous rosario A waves [Respiration, Rhythm And Depth] : normal respiratory rhythm and effort [Exaggerated Use Of Accessory Muscles For Inspiration] : no accessory muscle use [Abdomen Soft] : soft [Abdomen Tenderness] : non-tender [Abdomen Mass (___ Cm)] : no abdominal mass palpated [Abnormal Walk] : normal gait [Gait - Sufficient For Exercise Testing] : the gait was sufficient for exercise testing [Skin Color & Pigmentation] : normal skin color and pigmentation [] : no rash [Skin Lesions] : no skin lesions [No Venous Stasis] : no venous stasis [No Skin Ulcers] : no skin ulcer [No Xanthoma] : no  xanthoma was observed [Oriented To Time, Place, And Person] : oriented to person, place, and time [Affect] : the affect was normal [Mood] : the mood was normal [No Anxiety] : not feeling anxious [5th Left ICS - MCL] : palpated at the 5th LICS in the midclavicular line [Normal] : normal [No Precordial Heave] : no precordial heave was noted [Normal Rate] : normal [Heart Rate ___] : [unfilled] bpm [Rhythm Regular] : regular [Normal S1] : normal S1 [Normal S2] : normal S2 [No Gallop] : no gallop heard [No Murmur] : no murmurs heard [2+] : left 2+ [1+] : left 1+ [No Abnormalities] : the abdominal aorta was not enlarged and no bruit was heard [___ +] : bilateral [unfilled]U+ pitting edema to the ankles [FreeTextEntry1] : s/p right big toe amputation [Apical Thrill] : no thrill palpable at the apex [S3] : no S3 [S4] : no S4 [Click] : no click [Pericardial Rub] : no pericardial rub [Right Carotid Bruit] : no bruit heard over the right carotid [Right Femoral Bruit] : no bruit heard over the right femoral artery [Rt] : no varicose veins of the right leg [Lt] : no varicose veins of the left leg

## 2021-07-28 NOTE — REVIEW OF SYSTEMS
[Skin Wound] : skin wound [Negative] : Allergic/Immunologic [FreeTextEntry8] : . [de-identified] : toe infection right great toe

## 2021-07-28 NOTE — HISTORY OF PRESENT ILLNESS
[de-identified] : This is a 60 y/o male present for an evaluation of a previously diagnosed acute myeloid leukemia. Pt spent 6 weeks in [facility], receiving chemotherapy. Wound complications with treatment included toe infection, fungal infection on right cheek, and high fever(105). Pt has a history of multiple blood clots - factor V Leiden runs in family. His most recent clotting - In 2/2019, follow up w/ his regular hematologist revealed everything to be stable. Pt traveled for one month to [place] and [place]. In 4/2019, he returned to his hematologist complaining of SOB. Hewas rushed to the hospital, where they discovered low platelets, but pt states he discharged himself. Pt states his clotting was managed w/ Coumadin in the past, but due to adverse effects, is now currently taking Xarelto. Otherwise, he has no other cardiovascular issues. Pt diagnosed w/ sleep apnea, but refuses to use CPAP. Last colonoscopy completed 10 years ago. Former smoker, 40 years. No longer consumes alcohol, but in the past consumed multiple alcoholic beverages a day.\par Today he presents with 2 family members. He is ambulating via walker. He has completed 3 weeks of rehab at [facility] on 6/27. He complains of burning sensation w/ urination. Appetite has been good, gained 5 pounds in a few days, but states taste is impaired.  [de-identified] : Patient presents for a follow up appointment. He was recently admitted to [facility] on 7/11 to 7/16 for HIDAC consolidation chemotherapy but was held due to infection of his right great toe - hospital summary stated below. MRI from 7/11 revealed signal alteration of distal phalanx of the great toe. Given reported physical findings of overlying wound extending bone, this is suspicious osteomyelitis. There may be superimposed fracture of the tuft of the distal phalanx. \par He feels well today, denies pain in his toes. \par \par Hospital Course: \par Patient is a 59 year old male who was admitted for cycle 1 of HIDAC consolidation for AML following initial induction at [facility] on 5/2 with multiple complications.  Chemo was held and patient was discharged home due to active infection on right great toe. \par \par The patient now with osteomyelitis seen on MRI 7/11 was followed by ID inpatient with outpatient plan for 6 weeks of abx OR plan to move forward with amputation. In either case Podiatry who also followed the patient was admitted will see patient as outpatient and address the decision at that time. \par \par Patient was restarted on Xarelto for Factor V leiden following the placement of a PICC line. AC should be stopped should his platelets drop below 50. The patient was discharged home with the number to call to follow up outpatient with  (BPH), ID and Cardiology. He was seen by Cardiology while admitted because a recent ECHO and MUGA showed a decrease in EF 28% which was first noted following induction/reinduction at OSH. Beta blocker was titrated up and patient was advised to follow up outpatient. The patient will need to follow up with Dr. Coelho outpatient to determine long term plan as there is concern for relapse if no chemo is given in the setting of this active infection. \par

## 2021-07-28 NOTE — ASSESSMENT
[FreeTextEntry1] : 58 y/o male with a MDS/AM- poor risk.  Normal cytogenetics, negative FISH. \par Plan on admission to hospital .  Will plan on toe amputation for osteomyelitis first then once wound is healed, will plan on HIDAC consolidation 7/29.  \par BMT testing in process. \par \par

## 2021-07-28 NOTE — ADDENDUM
[FreeTextEntry1] : Documented by Cindy Pappas acting as a scribe for Dr. Debbi Coelho on 7/22/2019.\par \par All medical record entries made by the Scribe were at my, Dr. Debbi Coelho's, direction and personally dictated by me on 7/22/2019. I have reviewed the chart and agree that the record accurately reflects my personal performance of the history, physical exam, assessment and plan. I have also personally directed, reviewed, and agree with the discharge instructions.

## 2021-08-04 PROBLEM — Z91.81 PERSONAL HISTORY OF FALL: Status: ACTIVE | Noted: 2021-01-01

## 2021-08-09 PROBLEM — R29.6 UNWITNESSED FALL: Status: ACTIVE | Noted: 2021-01-01

## 2021-08-09 PROBLEM — Z79.899 HIGH RISK MEDICATION USE: Status: ACTIVE | Noted: 2021-01-01

## 2021-08-16 NOTE — ED PROVIDER NOTE - CLINICAL SUMMARY MEDICAL DECISION MAKING FREE TEXT BOX
The patient presents with generalized weakness and recurrent falls and has history of leukemia and will admit for further evaluation

## 2021-08-16 NOTE — H&P ADULT - NSHPSOCIALHISTORY_GEN_ALL_CORE
stopped smoking in 2017, prior to that smoked 1 ppd for about 40 years. drinks wine at night with dinner.

## 2021-08-16 NOTE — H&P ADULT - PROBLEM SELECTOR PLAN 5
pt. with h/o NICM EF 20 to 35 %, clinically not in chf, considering hypotension cardiologist is recommending to lower torsemide to 20 mg daily from 40 mg daily, coreg 3.125 mg bid with parameters. will get echo.

## 2021-08-16 NOTE — H&P ADULT - ASSESSMENT
pt. is a 60y/o male with hx HFrEF, EF 20-25% (NICM), AML s/p bone marrow transplant, FV Leiden w/ hx DVT/PE not on AC, Chronic thrombocytopenia, h/o urine retention several months ago during a hospital stay later saucedo removed but pt. kept on flomax and finasteride as per pt's girlfriend, Htn with frequent episodes of hypotension on going for many months ( as per pt. if sbp is 100 lying, will be 90 sitting and 70 on standing ) presents to the [facility] ED after multiple episodes of light-headedness and falls over the last month. Was seen at [facility] last week for similar issue and was discharged as per pt's girlfriend. Over the last month, he has felt the sensation of someone pushing him, which causes him to fall down. pt. reports no LOC with falling or head trauma, For the same period of time, he has been experiencing general fatigue, weakness, and poor appetite.  He has discussed these symptoms with his Cardiology, DR Zamudio, who has recommended holding his coreg as needed for low BP and decreased his diuretic dose. In the ER pt. is reporting no chest pain/pressure, palpitations, irregular and/or rapid heart beat, SOB, GEORGES, orthopnea, PND, cough, edema, n/v/d, fever, hemoptysis, melena or hematemesis .  As per pt. he is on injectable chemo once a month for 5 days, next is due after 1 week, follows with oncologist in [place], part of [facility].

## 2021-08-16 NOTE — ED ADULT NURSE REASSESSMENT NOTE - NS ED NURSE REASSESS COMMENT FT1
Assumed care of pt from previous RN. Pt A&Ox4,NAD. Breathing even and unlabored. Offering no complaints.

## 2021-08-16 NOTE — H&P ADULT - NSICDXPASTMEDICALHX_GEN_ALL_CORE_FT
PAST MEDICAL HISTORY:  Deep vein thrombosis (DVT)     Factor 5 Leiden mutation, heterozygous     H/O cardiomyopathy     History of hypotension     COLLEEN (obstructive sleep apnea)     Pulmonary embolism     Thrombocytopenia      PAST MEDICAL HISTORY:  Deep vein thrombosis (DVT)     Factor 5 Leiden mutation, heterozygous     H/O cardiomyopathy     History of hypotension     COLLEEN (obstructive sleep apnea) as per pt. he tried cpap/ bipap in the past did not like it so never used after that.    Pulmonary embolism     Thrombocytopenia

## 2021-08-16 NOTE — ED PROVIDER NOTE - OBJECTIVE STATEMENT
The patient is a 61 year old male presents with a history of CML with a fall due to feeling dizzy and many falls in the past

## 2021-08-16 NOTE — H&P ADULT - PROBLEM SELECTOR PLAN 1
pt. with chronic hypotension, etiology ? medication induced ? cardio consult appreciated, will request for orthostatic vitals, echo, coreg with parameters only and torsemide to 20 mg daily from 40 mg daily, will add midodrine 5 mg po tid. pt. got iv fluids in the ER, iv fluid to be used carefully as his EF is low 20 to 25 %.

## 2021-08-16 NOTE — H&P ADULT - HISTORY OF PRESENT ILLNESS
pt. is a 62y/o male with hx HFrEF, EF 20-25% (NICM), AML s/p bone marrow transplant, FV Leiden w/ hx DVT/PE not on AC, Chronic thrombocytopenia, Htn with frequent episodes of hypotension on going for many months ( as per pt. if sbp is 100 lying, will be 90 sitting and 70 on standing ) presents to the [facility] ED after multiple episodes of light-headedness and falls over the last month. Was seen at [facility] last week for similar issue and was discharged as per pt's girlfriend. Over the last month, he has felt the sensation of someone pushing him, which causes him to fall down. pt. reports no LOC with falling or head trauma, For the same period of time, he has been experiencing general fatigue, weakness, and poor appetite.  He has discussed these symptoms with his Cardiology, DR Zamudio, who has recommended holding his coreg as needed for low BP and decreased his diuretic dose. In the ER pt. is reporting no chest pain/pressure, palpitations, irregular and/or rapid heart beat, SOB, GEORGES, orthopnea, PND, cough, edema, n/v/d, fever, hemoptysis, melena or hematemesis  pt. is a 62y/o male with hx HFrEF, EF 20-25% (NICM), AML s/p bone marrow transplant, FV Leiden w/ hx DVT/PE not on AC, Chronic thrombocytopenia, Htn with frequent episodes of hypotension on going for many months ( as per pt. if sbp is 100 lying, will be 90 sitting and 70 on standing ) presents to the [facility] ED after multiple episodes of light-headedness and falls over the last month. Was seen at [facility] last week for similar issue and was discharged as per pt's girlfriend. Over the last month, he has felt the sensation of someone pushing him, which causes him to fall down. pt. reports no LOC with falling or head trauma, For the same period of time, he has been experiencing general fatigue, weakness, and poor appetite.  He has discussed these symptoms with his Cardiology, DR Zamudio, who has recommended holding his coreg as needed for low BP and decreased his diuretic dose. In the ER pt. is reporting no chest pain/pressure, palpitations, irregular and/or rapid heart beat, SOB, GEORGES, orthopnea, PND, cough, edema, n/v/d, fever, hemoptysis, melena or hematemesis .  As per pt. he is on injectable chemo once a month for 5 days, next is due after 1 week, follows with oncologist in [place], part of [facility]. pt. is a 62y/o male with hx HFrEF, EF 20-25% (NICM), AML s/p bone marrow transplant, FV Leiden w/ hx DVT/PE not on AC, Chronic thrombocytopenia, h/o urine retention several months ago during a hospital stay later saucedo removed but pt. kept on flomax and finasteride as per pt's girlfriend, Htn with frequent episodes of hypotension on going for many months ( as per pt. if sbp is 100 lying, will be 90 sitting and 70 on standing ) presents to the [facility] ED after multiple episodes of light-headedness and falls over the last month. Was seen at [facility] last week for similar issue and was discharged as per pt's girlfriend. Over the last month, he has felt the sensation of someone pushing him, which causes him to fall down. pt. reports no LOC with falling or head trauma, For the same period of time, he has been experiencing general fatigue, weakness, and poor appetite.  He has discussed these symptoms with his Cardiology, DR Zamudio, who has recommended holding his coreg as needed for low BP and decreased his diuretic dose. In the ER pt. is reporting no chest pain/pressure, palpitations, irregular and/or rapid heart beat, SOB, GEORGES, orthopnea, PND, cough, edema, n/v/d, fever, hemoptysis, melena or hematemesis .  As per pt. he is on injectable chemo once a month for 5 days, next is due after 1 week, follows with oncologist in [place], part of [facility]. pt. is a 60y/o male with hx HFrEF, EF 20-25% (NICM), AML s/p bone marrow transplant, FV Leiden w/ hx DVT/PE not on AC, Chronic thrombocytopenia, h/o urine retention several months ago during a hospital stay later saucedo removed but pt. kept on flomax and finasteride as per pt's girlfriend, Htn with frequent episodes of hypotension on going for many months ( as per pt. if sbp is 100 lying, will be 90 sitting and 70 on standing ) presents to the [facility] ED after multiple episodes of light-headedness and falls over the last month. Was seen at [facility] last week for similar issue and was discharged as per pt's girlfriend. Over the last month, he has felt the sensation of someone pushing him, which causes him to fall down. pt. reports no LOC with falling or head trauma, For the same period of time, he has been experiencing general fatigue, weakness, and poor appetite.  He has discussed these symptoms with his Cardiology, DR Zamudio, who has recommended holding his coreg as needed for low BP and decreased his diuretic dose. In the ER pt. is reporting no chest pain/pressure, palpitations, irregular and/or rapid heart beat, SOB, GEORGES, orthopnea, PND, cough, edema, n/v/d, fever, hemoptysis, melena or hematemesis . no HA.   As per pt. he is on injectable chemo once a month for 5 days, next is due after 1 week, follows with oncologist in [place], part of [facility]. pt. is a 60y/o male with hx HFrEF, EF 20-25% (NICM), AML s/p bone marrow transplant, FV Leiden w/ hx DVT/PE ( had about 15 years ago as per pt ) not on AC, Chronic thrombocytopenia, h/o urine retention several months ago during a hospital stay later saucedo removed but pt. kept on flomax and finasteride as per pt's girlfriend, Htn with frequent episodes of hypotension on going for many months ( as per pt. if sbp is 100 lying, will be 90 sitting and 70 on standing ) presents to the [facility] ED after multiple episodes of light-headedness and falls over the last month. Was seen at [facility] last week for similar issue and was discharged as per pt's girlfriend. Over the last month, he has felt the sensation of someone pushing him, which causes him to fall down. pt. reports no LOC with falling or head trauma, For the same period of time, he has been experiencing general fatigue, weakness, and poor appetite.  He has discussed these symptoms with his Cardiology, DR Zamudio, who has recommended holding his coreg as needed for low BP and decreased his diuretic dose. In the ER pt. is reporting no chest pain/pressure, palpitations, irregular and/or rapid heart beat, SOB, GEORGES, orthopnea, PND, cough, edema, n/v/d, fever, hemoptysis, melena or hematemesis . no HA.   As per pt. he is on injectable chemo once a month for 5 days, next is due after 1 week, follows with oncologist in [place], part of [facility].

## 2021-08-16 NOTE — CONSULT NOTE ADULT - ASSESSMENT
61M with likely symptomatic episodes of hypotension    HFrEF  - clinically euvolemic and asymptomatic  - sm-mod L pleural effusion, unclear chroncity  - be judicious with continue IV hydration  - patient has not taken coreg for the last 4 days, can restart at 3.125 bid, hold for SBP <90 or HR <60, given tachycardia  - decrease torsemide to 20mg daily for now   - strict I&Os  - follow up chest CT regarding pleural effusion with possible underlying pneumonia  - if BP remains low, consider trial of midodrine 5mg PO bid  - outpatient Advanced Heart Failure referral  - outpatient evaluation for possible ICD as Hb and PLTs allow  - limited echo to assess LV function and valves   - continue telemetry monitoring   - already received IVF; check orthostatic vitals   - suspect hepatic congestion, but consider transaminitis workup

## 2021-08-16 NOTE — H&P ADULT - NSHPPHYSICALEXAM_GEN_ALL_CORE
Vital Signs Last 24 Hrs  T(C): 36.6 (16 Aug 2021 20:36), Max: 36.6 (16 Aug 2021 20:36)  T(F): 97.9 (16 Aug 2021 20:36), Max: 97.9 (16 Aug 2021 20:36)  HR: 90 (16 Aug 2021 20:36) (90 - 110)  BP: 100/67 (16 Aug 2021 20:36) (92/60 - 103/71)  BP(mean): --  RR: 18 (16 Aug 2021 20:36) (18 - 22)  SpO2: 96% (16 Aug 2021 20:36) (91% - 96%) Vital Signs Last 24 Hrs  T(C): 36.6 (16 Aug 2021 20:36), Max: 36.6 (16 Aug 2021 20:36)  T(F): 97.9 (16 Aug 2021 20:36), Max: 97.9 (16 Aug 2021 20:36)  HR: 90 (16 Aug 2021 20:36) (90 - 110)  BP: 100/67 (16 Aug 2021 20:36) (92/60 - 103/71)  BP(mean): --  RR: 18 (16 Aug 2021 20:36) (18 - 22)  SpO2: 96% (16 Aug 2021 20:36) (91% - 96%)    General: pt. lying in bed not in distress.  HEENT: AT, NC. PERRL. intact EOM. no throat erythema or exudate.   Neck: supple. no JVD.   Chest: CTA bilaterally, no wheeze/ rales appreciated.  Heart: S1,S2. RRR. no heart murmur. no edema.   Abdomen: soft. non-tender. non-distended. + BS.   Ext: no calf tenderness on either side, distal pulses intact, s/p rt. big toe amputation.   Neuro: AAO x3. no focal weakness. no speech disorder, cns ii to xii intact. m/r/s intact.  Skin: warm and dry, no cyanosis, no obvious rash noted.   psych : no agitation, mood ok, no si/hi. Vital Signs Last 24 Hrs  T(C): 36.6 (16 Aug 2021 20:36), Max: 36.6 (16 Aug 2021 20:36)  T(F): 97.9 (16 Aug 2021 20:36), Max: 97.9 (16 Aug 2021 20:36)  HR: 90 (16 Aug 2021 20:36) (90 - 110)  BP: 100/67 (16 Aug 2021 20:36) (92/60 - 103/71)  BP(mean): --  RR: 18 (16 Aug 2021 20:36) (18 - 22)  SpO2: 96% (16 Aug 2021 20:36) (91% - 96%)    General: pt. lying in bed not in distress.  HEENT: AT, NC. PERRL. intact EOM. no throat erythema or exudate.   Neck: supple. no JVD.   Chest: CTA bilaterally, no wheeze/ rales appreciated.  Heart: S1,S2. RRR. no heart murmur. no edema.   Abdomen: soft. non-tender. non-distended. + BS.   Ext: no calf tenderness on either side, distal pulses intact, s/p rt. big toe amputation. SLR negative B/L.   Neuro: AAO x3. no focal weakness. no speech disorder, cns ii to xii intact. m/r/s intact.  Skin: warm and dry, no cyanosis, no obvious rash noted.   psych : no agitation, mood ok, no si/hi.

## 2021-08-16 NOTE — H&P ADULT - PROBLEM SELECTOR PLAN 2
pt. is frequently falling, hypotension / dizziness related ? pt's ct head: impression :   1)  involutional changes with volume loss. No acute abnormality or space-occupying lesion identified.  2)  no intracerebral hemorrhage, contusion, or extracerebral collections are identified.  ct cervical spine : 1. No acute fracture identified.  2. Multilevel degenerative changes with stenosis most severe at C5-6. MR imaging of the cervical spine may be considered to assess for cord impingement and possible changes of myelopathy.  3. Moderate size left pleural effusion with multifocal airspace disease suggesting pneumonia. CT imaging of the chest is therefore recommended.    - Will get MRI cervical, thoracic and LS and further pan as per findings, fall precautions, PT consult. pt. is frequently falling, hypotension / dizziness related ? pt's ct head: impression :   1)  involutional changes with volume loss. No acute abnormality or space-occupying lesion identified.  2)  no intracerebral hemorrhage, contusion, or extracerebral collections are identified.  ct cervical spine : 1. No acute fracture identified.  2. Multilevel degenerative changes with stenosis most severe at C5-6. MR imaging of the cervical spine may be considered to assess for cord impingement and possible changes of myelopathy.  3. Moderate size left pleural effusion with multifocal airspace disease suggesting pneumonia. CT imaging of the chest is therefore recommended.    - Will get MRI cervical, thoracic and LS and further pan as per findings, fall precautions, PT consult. possible GOLD.

## 2021-08-16 NOTE — H&P ADULT - PROBLEM SELECTOR PLAN 3
on ct neck finding of :  Moderate size left pleural effusion with multifocal airspace disease suggesting pneumonia. CT imaging of the chest is therefore recommended.  pt's ct chest is ordered by ER physician is pending at this point, will be empirically on zosyn.

## 2021-08-17 NOTE — PROGRESS NOTE ADULT - ASSESSMENT
61 years old male with history of Chronic Systolic Heart Failure (NICM), HTN with labile BP (frequent episodes of hypotension), AML s/p BM Transplant, Factor V Leiden Deficiency with history of DVT / PE - not on AC, Chronic Thrombocytopenia, Urinary Retention, Falls and Rib Fractures presented with dizziness and frequent falls.     1) Recurrent Falls  - CT with acute, subacute and chronic rib fractures  - R/O T12 fracture   - MRI Spine pending  - No pain. No neurologic deficit.   - Supportive care. Incentive spirometry. Fall precautions.   - PT recommending Acute Rehab  - PM&R eval     2) Hypotension  - Increased Midodrine to 10 mg TID  - Avoid IVF    3) B/L Pleural Effusions, small  - Continue Zosyn for suspected pneumonia (gram negative rods)  - Incentive spirometry     4) Chronic Systolic Heart Failure   - Continue Torsemide 20 mg daily  - Continue Coreg 3.125 mg BID with holding parameters   - ECHO pending   - Cardiology following     5) AML  - s/p BM transplant   - On Chemotherapy, next dose in 1 week  - Continue prophylactic Acyclovir and Voriconazole     6) Thrombocytopenia  - Chronic and stable   - Monitor     7) Transaminitis  - Hepatitis panel in progress  - RUQ US    DVT Prophylaxis -- Venodyne.    Dispo: Likely rehab once stable.

## 2021-08-17 NOTE — PROGRESS NOTE ADULT - ASSESSMENT
61M with likely symptomatic episodes of hypotension    8/17: Pt denies chest pain, palpitations, SOB. Tele-NSR HR @ 80-90s with occasional PVCs, and bi-fasicular block. Cont. current medication regimen. Echo-pending.     HFrEF  - clinically euvolemic and asymptomatic  -Orthostatic BP readings ordered  - strict I&Os  -Chest CT-Age-indeterminate mild anterior wedging of the thoracic spine. Recommended to previous outside study. If there is clinical suspicion for acute fracture, MRI may be obtained for further evaluation. Small left greater than the right pleural effusion with atelectasis. Nonspecific interlobular septal thickening and groundglass opacities. Differential diagnosis includes pulmonary edema and pneumonia. Recommend clinical correlation. Scattered tree-in-bud/nodular opacities in both lungs, which can be seen in small airway disease. Reticular opacities in the left upper lobe/lingula. Somewhat irregular opacity in the left lower lobe. Recommend comparison to previous outside study to assess stability. Follow-up may be obtained for further evaluation  - outpatient Advanced Heart Failure referral  - outpatient evaluation for possible ICD as Hb and PLTs allow  -Echo-pending   - continue telemetry monitoring     Hypotension  -Recent BP-89/59  -Cont. midodrine 10mg TID

## 2021-08-17 NOTE — PHYSICAL THERAPY INITIAL EVALUATION ADULT - PERTINENT HX OF CURRENT PROBLEM, REHAB EVAL
62 y/o male with falls/lightheadedness, chronic low BP. Pleural effusion, ?PNA.  MRIs pending to assess for spinal stenosis.

## 2021-08-17 NOTE — PHYSICAL THERAPY INITIAL EVALUATION ADULT - ADDITIONAL COMMENTS
Pt reports living with significant other (Ashlyn-YAIR) in a condo. No steps to enter. Full flight to bedroom. SO works during the day. Pt independent at baseline. Has been using a RW recently due to falls and balance issues. Also owns a cane.

## 2021-08-17 NOTE — PHYSICAL THERAPY INITIAL EVALUATION ADULT - GENERAL OBSERVATIONS, REHAB EVAL
Pt received lying on stretcher in ED holding, (+) tele, (+) IV lock, Pt with nasal cannula lying next to him on the bed, breathing RA.

## 2021-08-18 NOTE — CONSULT NOTE ADULT - SUBJECTIVE AND OBJECTIVE BOX
[place] [facility]                                                               Office:  [address] [address]                                                              Telephone: [phone]. Fax:[phone]                                                                        CARDIOLOGY CONSULTATION NOTE                                                                                             Consult requested by:  Dr Michael  Reason for Consultation: hypotension  History obtained by: Patient and medical record   obtained: No  Cardiologist: Dr Zamudio     Chief complaint:  61yMale   Patient is a 61y old  Male who presents with a chief complaint of hypotension and falls    HPI:  61M hx HFrEF, EF 20-25% (NICM), AML s/p bone marrow transplant, FV Leiden w/ hx DVT/PE not on AC, Htn presents to the [facility] ED after multiple episodes of light-headedness and falls over the last month.  Was hospitalized at [facility] last week for similar issue. Over the last month, he has felt the sensation of someone pushing him, which causes him to fall down.  Never any associated LoC or head injury, per patient.  For the same period of time, he has been experiencing general fatigue, weakness, and poor appetite.  He has discussed these symptoms with his Cardiology, DR Zamudio, who has recommended holding his coreg as needed for low BP and decreased his diuretic dose.     Seen in the ED he feels at his baseline state of health.  Denies chest pain/pressure, palpitations, irregular and/or rapid heart beat, SOB, GEORGES, orthopnea, PND, cough, edema, f/c, n/v/d, hematuria, or hematochezia.       REVIEW OF SYSTEMS:     CONSTITUTIONAL: No fever, weight loss, +fatigue, weakness  ENMT:  No difficulty hearing, tinnitus, vertigo; No sinus or throat pain  NECK: No pain or stiffness  CARDIOVASCULAR: No chest pain, dyspnea, syncope, palpitations, dizziness, Orthopnea, Paroxsymal nocturnal dyspnea  RESPIRATORY: No Dyspnea on exertion, Shortness of breath, cough, wheezing  : No dysuria, no hematuria   GI: No dark color stool, no melena, no diarrhea, no constipation, no abdominal pain   NEURO: No headache, +dizziness, no slurred speech   MUSCULOSKELETAL: No joint pain or swelling; No muscle, back, or extremity pain  PSYCH: No agitation, no anxiety.    ALL OTHER REVIEW OF SYSTEMS ARE NEGATIVE.      PREVIOUS DIAGNOSTIC TESTING  ECHO FINDINGS: 20: EF 21%, severely reduced RV function    cMRI 9/3/19: EF 24%, basal septal LGE consistent with NICM        ALLERGIES: Allergies    No Known Allergies    Intolerances          PAST MEDICAL HISTORY  Factor 5 Leiden mutation, heterozygous    H/O cardiomyopathy    Deep vein thrombosis (DVT)    Pulmonary embolism    COLLEEN (obstructive sleep apnea)        PAST SURGICAL HISTORY  No significant past surgical history        FAMILY HISTORY:  FH: heart attack  father, mother, and brother        SOCIAL HISTORY:  Denies smoking/alcohol/drugs      CURRENT MEDICATIONS:         HOME MEDICATIONS:  Home Medications:  acyclovir 400 mg oral tablet: 1 tab(s) orally 2 times a day (2021 14:32)  atovaquone 750 mg/5 mL oral suspension: 5 milliliter(s) orally 2 times a day (2021 14:32)  carvedilol 3.125 mg oral tablet: 1 tab(s) orally 2 times a day (2021 14:32)  finasteride 5 mg oral tablet: 1 tab(s) orally once a day (2021 14:32)  Lasix 40 mg oral tablet: 1 tab(s) orally once a day (2021 14:32)  Multiple Vitamins oral tablet: 1 tab(s) orally once a day (2021 14:32)  Pepcid 20 mg oral tablet: 1 tab(s) orally 2 times a day (2021 14:32)  polymyxin B-trimethoprim 10,000 units-1 mg/mL ophthalmic solution: 1 drop(s) to both  eyes 2 times a day (2021 14:32)  Protonix 40 mg oral delayed release tablet: 1 tab(s) orally once a day (2021 14:32)  sertraline 50 mg oral tablet: 1 tab(s) orally once a day (2021 14:32)  tamsulosin 0.4 mg oral capsule: 1 cap(s) orally once a day (at bedtime) (2021 14:32)  triamcinolone 0.1% topical cream: Apply topically to affected area 2 times a day (2021 14:32)  voriconazole 200 mg oral tablet: 1 tab(s) orally every 12 hours (2021 14:32)  ZyrTEC 10 mg oral tablet: 1 tab(s) orally once a day (2021 14:32)      Vital Signs Last 24 Hrs  T(C): 36.3 (16 Aug 2021 16:04), Max: 36.5 (16 Aug 2021 13:09)  T(F): 97.4 (16 Aug 2021 16:04), Max: 97.7 (16 Aug 2021 13:09)  HR: 96 (16 Aug 2021 16:04) (96 - 110)  BP: 103/71 (16 Aug 2021 16:04) (92/60 - 103/71)  BP(mean): --  RR: 18 (16 Aug 2021 16:04) (18 - 22)  SpO2: 91% (16 Aug 2021 16:04) (91% - 94%)      PHYSICAL EXAM:  Constitutional: Comfortable . No acute distress.   HEENT: Atraumatic and normocephalic , neck is supple . no JVD. No carotid bruit. PEERL   CNS: A&Ox3. No focal deficits. EOMI. Cranial nerves II-IX are intact.   Lymph Nodes: Cervical : Not palpable.  Respiratory: decreased breath sound L base  Cardiovascular: S1S2 RRR. No murmur/rubs or gallop.  Gastrointestinal: Soft non-tender and non distended . +Bowel sounds. negative Castillo's sign.  Extremities: No edema.   Psychiatric: Calm . no agitation.  Skin: multiple healing skin scratches    Intake and output:     LABS:                        10.0   6.88  )-----------( 66       ( 16 Aug 2021 14:25 )             29.9     08-16    139  |  100  |  10.0  ----------------------------<  129<H>  3.6   |  26.0  |  1.09    Ca    8.5<L>      16 Aug 2021 14:25    TPro  4.9<L>  /  Alb  3.0<L>  /  TBili  0.9  /  DBili  x   /  AST  118<H>  /  ALT  65<H>  /  AlkPhos  376<H>      CARDIAC MARKERS ( 16 Aug 2021 14:25 )  x     / 0.02 ng/mL / x     / x     / x        ;p-BNP=    Urinalysis Basic - ( 16 Aug 2021 18:30 )    Color: Yellow / Appearance: Clear / S.010 / pH: x  Gluc: x / Ketone: Negative  / Bili: Negative / Urobili: Negative mg/dL   Blood: x / Protein: Negative mg/dL / Nitrite: Negative   Leuk Esterase: Negative / RBC: x / WBC x   Sq Epi: x / Non Sq Epi: x / Bacteria: x          INTERPRETATION OF TELEMETRY: Reviewed by me.   ECG: Reviewed by me.  sinus tach, RBBB, inf Q waves, not significantly changed compared to prior outpatient EKG    RADIOLOGY & ADDITIONAL STUDIES:    X-ray:  reviewed by me.   < from: Xray Chest 1 View- PORTABLE-Urgent (21 @ 14:04) >  IMPRESSION:  No pneumothorax or fracture in this single view.    Small left pleural effusion/adjacent atelectasis, improved    --- End of Report ---            CARLOS CUADRA DO; Attending Radiologist  This document has been electronically signed. Aug 16 2021  2:56PM    < end of copied text >      CT scan:   < from: CT Cervical Spine No Cont (21 @ 13:56) >  IMPRESSION:      1. No acute fracture identified.  2. Multilevel degenerative changes with stenosis most severe at C5-6. MR imaging of the cervical spine may be considered to assess for cord impingement and possible changes of myelopathy.  3. Moderate size left pleural effusion with multifocal airspace disease suggesting pneumonia. CT imaging of the chest is therefore recommended.    --- End of Report ---            BUD PHILLIP MD; Attending Radiologist  This document has been electronically signed. Aug 16 2021  2:09PM    < end of copied text >    
61yM was admitted on  with lightheadedness and falls related to BP.     Imaging performed:  CT C SPINE  - 1. No acute fracture identified. 2. Multilevel degenerative changes with stenosis most severe at C5-6. MR imaging of the cervical spine may be considered to assess for cord impingement and possible changes of myelopathy.  3. Moderate size left pleural effusion with multifocal airspace disease suggesting pneumonia. CT imaging of the chest is therefore recommended.    CT HEAD  -  1)  involutional changes with volume loss. No acute abnormality or space-occupying lesion identified. 2)  no intracerebral hemorrhage, contusion, or extracerebral collections are identified.    CT CHEST  - Acute, minimally displaced fractures of the right 8th through 10th posterior ribs. Multiple additional subacute and chronic rib fractures. No pneumothorax. Age-indeterminate mild anterior wedging of the thoracic spine. Recommended to previous outside study. If there is clinical suspicion for acute fracture, MRI may be obtained for further evaluation. Small left greater than the right pleural effusion with atelectasis. Nonspecific interlobular septal thickening and groundglass opacities. Differential diagnosis includes pulmonary edema and pneumonia. Recommend clinical correlation. Scattered tree-in-bud/nodular opacities in both lungs, which can be seen in small airway disease. Reticular opacities in the left upper lobe/lingula. Somewhat irregular opacity in the left lower lobe. Recommend comparison to previous outside study to assess stability. Follow-up may be obtained for further evaluation. Additional findings as described.    Patient irritable in explaining that he was listed under wrong room.  Also upset he was seen by PT and didnt get PT, just walked around.   Reports no pain.  Wants rehab.     REVIEW OF SYSTEMS  Constitutional - No fever, No weight loss, No fatigue  HEENT - No eye pain, No visual disturbances, No difficulty hearing, No tinnitus, No vertigo, No neck pain  Respiratory - No cough, No wheezing, No shortness of breath  Cardiovascular - No chest pain, No palpitations  Gastrointestinal - No abdominal pain, No nausea, No vomiting, No diarrhea, No constipation  Genitourinary - No dysuria, No frequency, No hematuria, No incontinence  Neurological - No headaches, No memory loss, +loss of strength, No numbness, No tremors  Skin - No itching, No rashes, No lesions   Endocrine - No temperature intolerance  Musculoskeletal - No joint pain, No joint swelling, No muscle pain  Psychiatric - No depression, No anxiety    VITALS  T(C): 36.6 (21 @ 07:51), Max: 36.9 (21 @ 21:03)  HR: 88 (21 @ 04:43) (74 - 97)  BP: 93/58 (21 @ 07:51) (89/58 - 95/64)  RR: 20 (21 @ 07:51) (18 - 20)  SpO2: 90% (21 @ 07:51) (90% - 97%)  Wt(kg): --    PAST MEDICAL & SURGICAL HISTORY  Factor 5 Leiden mutation, heterozygous    H/O cardiomyopathy    Deep vein thrombosis (DVT)    Pulmonary embolism    COLLEEN (obstructive sleep apnea)    Thrombocytopenia    History of hypotension    No significant past surgical history        FUNCTIONAL HISTORY  Lives with significant other, 0 MARIE  Independent    CURRENT FUNCTIONAL STATUS    Bed Mobility: Sit to Supine:     · Level of [place]	stand-by assist    Bed Mobility: Supine to Sit:     · Level of [place]	minimum assist (75% patients effort)  · Physical Assist/Nonphysical Assist	1 person assist; verbal cues    Bed Mobility Analysis:     · Impairments Contributing to Impaired Bed Mobility	decreased strength    Transfer: Sit to Stand:     · Level of [place]	minimum assist (75% patients effort)  · Physical Assist/Nonphysical Assist	1 person assist; verbal cues  · Weight-Bearing Restrictions	full weight-bearing  · Assistive Device	rolling walker    Transfer: Stand to Sit:     · Level of [place]	minimum assist (75% patients effort)  · Physical Assist/Nonphysical Assist	1 person assist; verbal cues  · Weight-Bearing Restrictions	full weight-bearing  · Assistive Device	rolling walker    Sit/Stand Transfer Safety Analysis:     · Impairments Contributing to Impaired Transfers	decreased strength    Gait Skills:     · Level of [place]	minimum assist (75% patients effort)  · Physical Assist/Nonphysical Assist	1 person assist; verbal cues  · Weight-Bearing Restrictions	full weight-bearing  · Assistive Device	rolling walker  · Gait Distance	35 feet      SOCIAL HISTORY - as per documentation/history  Smoking - None  EtOH - None  Drugs - None    FAMILY HISTORY   FH: heart attack        RECENT LABS - Reviewed  CBC Full  -  ( 18 Aug 2021 09:30 )  WBC Count : 5.42 K/uL  RBC Count : 3.02 M/uL  Hemoglobin : 10.0 g/dL  Hematocrit : 31.4 %  Platelet Count - Automated : 69 K/uL  Mean Cell Volume : 104.0 fl  Mean Cell Hemoglobin : 33.1 pg  Mean Cell Hemoglobin Concentration : 31.8 gm/dL  Auto Neutrophil # : 3.52 K/uL  Auto Lymphocyte # : 1.09 K/uL  Auto Monocyte # : 0.52 K/uL  Auto Eosinophil # : 0.29 K/uL  Auto Basophil # : 0.00 K/uL  Auto Neutrophil % : 64.9 %  Auto Lymphocyte % : 20.2 %  Auto Monocyte % : 9.6 %  Auto Eosinophil % : 5.3 %  Auto Basophil % : 0.0 %        139  |  101  |  9.3  ----------------------------<  97  3.8   |  27.0  |  0.88    Ca    8.7      18 Aug 2021 09:30  Mg     1.8         TPro  4.6<L>  /  Alb  2.9<L>  /  TBili  1.2  /  DBili  x   /  AST  133<H>  /  ALT  71<H>  /  AlkPhos  387<H>      Urinalysis Basic - ( 16 Aug 2021 18:30 )    Color: Yellow / Appearance: Clear / S.010 / pH: x  Gluc: x / Ketone: Negative  / Bili: Negative / Urobili: Negative mg/dL   Blood: x / Protein: Negative mg/dL / Nitrite: Negative   Leuk Esterase: Negative / RBC: x / WBC x   Sq Epi: x / Non Sq Epi: x / Bacteria: x        ALLERGIES  No Known Allergies      MEDICATIONS   acyclovir   Oral Tab/Cap 400 milliGRAM(s) Oral two times a day  albuterol/ipratropium for Nebulization 3 milliLiter(s) Nebulizer every 6 hours PRN  ALPRAZolam 0.5 milliGRAM(s) Oral at bedtime  carvedilol 3.125 milliGRAM(s) Oral every 12 hours  finasteride 5 milliGRAM(s) Oral daily  folic acid 1 milliGRAM(s) Oral daily  lactobacillus acidophilus 1 Tablet(s) Oral three times a day with meals  magnesium oxide 400 milliGRAM(s) Oral three times a day with meals  midodrine. 10 milliGRAM(s) Oral three times a day  multivitamin 1 Tablet(s) Oral daily  pantoprazole    Tablet 40 milliGRAM(s) Oral before breakfast  piperacillin/tazobactam IVPB.. 3.375 Gram(s) IV Intermittent every 8 hours  potassium chloride    Tablet ER 40 milliEquivalent(s) Oral once  sertraline 50 milliGRAM(s) Oral daily  tamsulosin 0.4 milliGRAM(s) Oral at bedtime  torsemide 20 milliGRAM(s) Oral daily  voriconazole 200 milliGRAM(s) Oral every 12 hours      ----------------------------------------------------------------------------------------  PHYSICAL EXAM  Constitutional - NAD, Comfortable  HEENT - NCAT, EOMI  Neck - Supple, No limited ROM  Chest - Breathing comfortably, No wheezing  Abdomen - ND  Extremities - No   Neurologic Exam -                    Cognitive - AAO to self, place, date, year, situation     Communication - Fluent, No dysarthria     Cranial Nerves - No lip droop     Motor - Limited exam  Psychiatric - Affect WNL  ----------------------------------------------------------------------------------------  ASSESSMENT/PLAN  61yMale with functional deficits after have multiple falls  ID - Zosyn, Zovirax, Vfend  HypoTN - Midodrine  HTN - Demadex, Coreg, Proscar  Mood - Zoloft  Pain - Tylenol  DVT PPX - SCDs  Rehab - At this time, recommend [facility], patient DOES NOT meet acute inpatient rehabilitation criteria. Patient needs a more prolonged stay to achieve transition to community living and would not be able to tolerate a comprehensive/intense rehab program of 3hours/day. Will sign off at this time. Thank you for allowing me to be part of your patient's care. Please reconsult PMR for additional rehab recommendations or dispo needs if functional status changes.     Discussed with rehab clinical care team.

## 2021-08-18 NOTE — PROGRESS NOTE ADULT - ASSESSMENT
61 years old male with history of Chronic Systolic Heart Failure (NICM), HTN with labile BP (frequent episodes of hypotension), AML s/p BM Transplant, Factor V Leiden Deficiency with history of DVT / PE - not on AC, Chronic Thrombocytopenia, Urinary Retention, Falls and Rib Fractures presented with dizziness and frequent falls.     1) Recurrent Falls  - CT with acute, subacute and chronic rib fractures  - R/O T12 fracture   - MRI Spine pending  - No pain. No neurologic deficit.   - Supportive care. Incentive spirometry. Fall precautions.   - PT recommending Acute Rehab  - PM&R eval noted: does not meed Acute Rehab criteria, recommending [facility].     2) Hypotension  - Continue Midodrine 10 mg TID  - Avoid IVF    3) B/L Pleural Effusions, small  - Continue Zosyn for suspected pneumonia (gram negative rods)  - Incentive spirometry     4) Chronic Combined Systolic/Diastolic Heart Failure   - Continue Torsemide 20 mg daily  - Continue Coreg 3.125 mg BID with holding parameters   - ECHO as above   - Cardiology following     5) AML  - s/p BM transplant   - On Chemotherapy, next dose in 1 week  - Continue prophylactic Acyclovir and Voriconazole     6) Thrombocytopenia  - Chronic and stable   - Monitor     7) Transaminitis  - Hepatitis panel negative  - RUQ US unremarkable  - secondary to ? hepatic congestion vs chemotherapy     DVT Prophylaxis -- Venodyne.    Dispo: [facility] likely in 48 hours.          61 years old male with history of Chronic Systolic Heart Failure (NICM), HTN with labile BP (frequent episodes of hypotension), AML s/p BM Transplant, Factor V Leiden Deficiency with history of DVT / PE - not on AC, Chronic Thrombocytopenia, Urinary Retention, Falls and Rib Fractures presented with dizziness and frequent falls.     1) Recurrent Falls  - CT with acute, subacute and chronic rib fractures  - R/O T12 fracture   - MRI Spine pending  - No pain. No neurologic deficit.   - Supportive care. Incentive spirometry. Fall precautions.   - PT recommending Acute Rehab  - PM&R eval noted: does not meed Acute Rehab criteria, recommending [facility].     2) Hypotension  - Continue Midodrine 10 mg TID  - Avoid IVF    3) B/L Pleural Effusions, small  - Continue Zosyn for suspected pneumonia (gram negative rods)  - Incentive spirometry     4) Chronic Combined Systolic/Diastolic Heart Failure   - Continue Torsemide 20 mg daily  - Continue Coreg 3.125 mg BID with holding parameters   - ECHO as above   - Cardiology following     5) AML  - s/p BM transplant   - On Chemotherapy, next dose in 1 week  - Continue prophylactic Acyclovir and Voriconazole     6) Thrombocytopenia  - Chronic and stable   - Monitor     7) Transaminitis  - Hepatitis panel negative  - RUQ US unremarkable  - secondary to ? hepatic congestion vs chemotherapy     DVT Prophylaxis -- Venodyne.    Dispo: [facility] likely in 48 hours.     Full. Code. Prognosis guarded.     Updated patient's girlfriend. She was requesting 3rd / Booster Dose of COVID-19 vaccine. Explained to her -- we are currently not offering that dose in the hospital.

## 2021-08-18 NOTE — PROGRESS NOTE ADULT - ASSESSMENT
61M with likely symptomatic episodes of hypotension    8/18: Pt denies chest pain, palpitations, SOB. Tele-SR with bi-fasiculat block, PACs, 5 beats NSVT.  Echo- EF <20%, grade II diastolic dysfx., no evidence of LV thrombus, mildly enlarged RA, large pleural effusion in the left lateral region, small pericardial effusion, trace MVR, mild TR, mild to moderate pulm. valve regurgitation. Orthostatic BP - +orthostatic hypotension, Patient hypotensive, Consider minimal amount of supplemental IVF to increase BP so GDMT can be restarted.     HFrEF  - clinically euvolemic and asymptomatic  -Patient hypotensive, Consider minimal amount of supplemental IVF to increase BP so GDMT can be restarted  -Orthostatic BP - +orthostatic hypotension  -strict I&Os  -Chest CT-Age-indeterminate mild anterior wedging of the thoracic spine. Recommended to previous outside study. If there is clinical suspicion for acute fracture, MRI may be obtained for further evaluation. Small left greater than the right pleural effusion with atelectasis. Nonspecific interlobular septal thickening and groundglass opacities. Differential diagnosis includes pulmonary edema and pneumonia. Recommend clinical correlation. Scattered tree-in-bud/nodular opacities in both lungs, which can be seen in small airway disease. Reticular opacities in the left upper lobe/lingula. Somewhat irregular opacity in the left lower lobe. Recommend comparison to previous outside study to assess stability. Follow-up may be obtained for further evaluation  -outpatient Advanced Heart Failure referral  -outpatient evaluation for possible ICD as Hb and PLTs allow  -Echo-EF <20%, grade II diastolic dysfx., no evidence of LV thrombus, mildly enlarged RA, large pleural effusion in the left lateral region, small pericardial effusion, trace MVR, mild TR, mild to moderate pulm. valve regurgitation  - continue telemetry monitoring     Hypotension  -Recent BP-96/52  -Cont. midodrine 10mg TID  -Consider minimal amount of supplemental IVF to increase BP so GDMT can be restarted      61M with likely symptomatic episodes of hypotension    8/18: Pt denies chest pain, palpitations, SOB. Tele-SR with bi-fasiculat block, PACs, 5 beats NSVT.  Echo- EF <20%, grade II diastolic dysfx., no evidence of LV thrombus, mildly enlarged RA, large pleural effusion in the left lateral region, small pericardial effusion, trace MVR, mild TR, mild to moderate pulm. valve regurgitation. Orthostatic BP - +orthostatic hypotension, Patient hypotensive, Consider minimal amount of supplemental IVF    HFrEF  - clinically euvolemic and asymptomatic  -Patient hypotensive, Consider minimal amount of supplemental IVF to increase BP so GDMT can be restarted  -Orthostatic BP - +orthostatic hypotension  -strict I&Os  -outpatient Advanced Heart Failure referral  -outpatient evaluation for possible ICD as Hb and PLTs allow  -Echo-EF <20%, grade II diastolic dysfx., no evidence of LV thrombus, mildly enlarged RA, large pleural effusion in the left lateral region, small pericardial effusion, trace MVR, mild TR, mild to moderate pulm. valve regurgitation  - continue telemetry monitoring     Hypotension  -Recent BP-96/52  -Cont. midodrine 10mg TID  -Consider minimal amount of supplemental IVF to increase BP so GDMT can be restarted

## 2021-08-19 PROBLEM — G47.33 OBSTRUCTIVE SLEEP APNEA (ADULT) (PEDIATRIC): Chronic | Status: ACTIVE | Noted: 2019-07-25

## 2021-08-19 PROBLEM — Z86.79 PERSONAL HISTORY OF OTHER DISEASES OF THE CIRCULATORY SYSTEM: Chronic | Status: ACTIVE | Noted: 2021-01-01

## 2021-08-19 PROBLEM — D69.6 THROMBOCYTOPENIA, UNSPECIFIED: Chronic | Status: ACTIVE | Noted: 2021-01-01

## 2021-08-19 NOTE — PROGRESS NOTE ADULT - ASSESSMENT
60 y/o male with hx HFrEF, EF 20-25% (NICM), AML s/p bone marrow transplant, FV Leiden w/ hx DVT/PE ( had about 15 years ago as per pt ) not on AC, Chronic thrombocytopenia  presents to the [facility] ED after multiple episodes of light-headedness and falls over the last month. Was seen at [facility] last week for similar issue and was discharged as per pt's girlfriend.  coreg was recently decreased due to hypotension and he reports palpitations on admission.  We were consulted for palpitations.  Telemetry monitoring reveals sinus tach at times  ECHO ef >20% Grade 2 DD Large left pleural effusion  Mild to mod pulmonic valve regurg small pericardial effusion bmp [id number].      CHF eF 25% -> Non ischemic cardiomyopathy   c/w demadex 20 qd  intake and out put is not accurate will order strict i & O  Low sodium diet  c/w coreg & torsemide  c/w midodrine for b/p support    HX of AML s/p bone marrow transplant  on chemo monthly        CARDIAC MEDS  carvedilol 3.125 milliGRAM(s) Oral every 12 hours  magnesium oxide 400 milliGRAM(s) Oral three times a day with meals  midodrine. 10 milliGRAM(s) Oral three times a day  torsemide 20 milliGRAM(s) Oral daily

## 2021-08-19 NOTE — PROGRESS NOTE ADULT - ATTENDING COMMENTS
61M with likely symptomatic hypotension    - agree with midodrine for now  - if continues to have low BP, would discontinue coreg  - clinically euvolemic, continue on current lower dose of torsemide  - would benefit from Advanced Heart Failure and EP referral as outpatient  - treatment of pneumonia per primary team
61M with symptomatic hypotension and HFrEF    - severe biventricular failure  - coreg 3.125/bid with holds for SBP <90 or HR <60  - continue torsemide 20mg PO daily  - midodrine for BP support as needed  - given + orthostatic vitals, gentle IVF hydration until resolved  - candidacy for device based therapies of advanced heart failure interventions is contingent on prognosis of AML; likely outpatient referral  - pneumonia treatment per primary team
61M with symptomatic hypotension and chronic HFrEF    - severe biventricular failure  - coreg 3.125/bid with holds for SBP <90 or HR <60  - continue torsemide 20mg PO daily  - midodrine for BP support as needed; wean as able  - given + orthostatic vitals, gentle IVF hydration until resolved  - candidacy for device based therapies or advanced heart failure interventions is contingent on prognosis of AML; likely outpatient referral  - pneumonia treatment and discharge planning per primary team .    stable from cardiovascular standpoint  follow up with his Cardiologist, Dr Zamudio, in 1-2 weeks after discharge   please call with questions or reconsult as needed

## 2021-08-19 NOTE — PROGRESS NOTE ADULT - REASON FOR ADMISSION
Frequent falls / hypotension

## 2021-08-19 NOTE — PROGRESS NOTE ADULT - ASSESSMENT
61 years old male with history of Chronic Systolic Heart Failure (NICM), HTN with labile BP (frequent episodes of hypotension), AML s/p BM Transplant, Factor V Leiden Deficiency with history of DVT / PE - not on AC, Chronic Thrombocytopenia, Urinary Retention, Falls and Rib Fractures presented with dizziness and frequent falls.     1) Recurrent Falls  - CT with acute, subacute and chronic rib fractures  - MRI Spine with no acute findings. Mild spinal cord impingement at T6-7 secondary to tiny disc protrusion.    - No pain. No neurologic deficit.   - Supportive care. Incentive spirometry. Fall precautions.   - PT recommended Acute Rehab  - PM&R eval noted: does not meet Acute Rehab criteria, recommending [facility].     2) Hypotension  - Continue Midodrine 10 mg TID  - Avoid IVF    3) B/L Pleural Effusions, small  - Continue Zosyn for suspected pneumonia (gram negative rods), will change to PO on discharge for 7 days in total   - Incentive spirometry     4) Chronic Combined Systolic/Diastolic Heart Failure   - Continue Torsemide 20 mg daily  - Continue Coreg 3.125 mg BID with holding parameters   - ECHO as above   - Cardiology follow up noted: outpatient follow up with advanced heart failure specialist     5) AML  - s/p BM transplant   - On Chemotherapy, next dose in 1 week  - Continue prophylactic Acyclovir and Voriconazole     6) Thrombocytopenia  - Chronic and stable   - Monitor     7) Transaminitis  - Hepatitis panel negative  - RUQ US unremarkable  - secondary to ? hepatic congestion vs chemotherapy     DVT Prophylaxis -- Venodyne.    Dispo: [facility] once arranged.      Full Code. Prognosis guarded.

## 2021-08-19 NOTE — PROGRESS NOTE ADULT - SUBJECTIVE AND OBJECTIVE BOX
[facility]                                                               Office: [address] [address]                                                              Telephone: [phone]. Fax:[phone]                                                                             PROGRESS NOTE  Reason for follow up: frequent falls / hypotension  Overnight: No new events.   Update: 8/18: Pt denies chest pain, palpitations, SOB. Tele-SR with bi-fasiculat block, PACs, 5 beats NSVT.  Echo- EF <20%, grade II diastolic dysfx., no evidence of LV thrombus, mildly enlarged RA, large pleural effusion in the left lateral region, small pericardial effusion, trace MVR, mild TR, mild to moderate pulm. valve regurgitation. Orthostatic BP - +orthostatic hypotension, Patient hypotensive, Consider minimal amount of supplemental IVF to increase BP so GDMT can be restarted.       Subjective: No new events    	  Vitals:  T(C): 36.5 (08-18-21 @ 14:36), Max: 36.9 (08-17-21 @ 21:03)  HR: 71 (08-18-21 @ 14:36) (71 - 97)  BP: 96/52 (08-18-21 @ 11:50) (89/58 - 96/52)  RR: 20 (08-18-21 @ 14:36) (18 - 20)  SpO2: 94% (08-18-21 @ 14:36) (90% - 97%)    I&O's Summary    18 Aug 2021 07:01  -  18 Aug 2021 15:27  --------------------------------------------------------  IN: 0 mL / OUT: 400 mL / NET: -400 mL      Weight (kg): 79.4 (08-16 @ 13:09)      PHYSICAL EXAM:  Appearance: Comfortable. No acute distress  HEENT:  Head and neck: Atraumatic. Normocephalic.  Normal oral mucosa, PERRL, Neck is supple. No JVD, No carotid bruit.   Neurologic: A & O x 3, no focal deficits. EOMI.  Lymphatic: No cervical lymphadenopathy  Cardiovascular: Normal S1 S2, No murmur, rubs/gallops. No JVD, No edema  Respiratory: Lungs clear to auscultation  Gastrointestinal:  Soft, Non-tender, + BS  Lower Extremities: No edema  Psychiatry: Patient is calm. No agitation. Mood & affect appropriate  Skin: No rashes/ ecchymoses/cyanosis/ulcers visualized on the face, hands or feet.      CURRENT MEDICATIONS:  carvedilol 3.125 milliGRAM(s) Oral every 12 hours  midodrine. 10 milliGRAM(s) Oral three times a day  tamsulosin 0.4 milliGRAM(s) Oral at bedtime  torsemide 20 milliGRAM(s) Oral daily  acyclovir   Oral Tab/Cap  piperacillin/tazobactam IVPB..  voriconazole  ALPRAZolam  sertraline  pantoprazole    Tablet  finasteride  folic acid  magnesium oxide  multivitamin      DIAGNOSTIC TESTING:    [ ] Echocardiogram: < from: TTE Echo Complete w/o Contrast w/ Doppler (08.17.21 @ 12:35) >  Summary:   1. Left ventricular ejection fraction, by visual estimation, is <20%.   2. Severely decreased global left ventricular systolic function.   3. Severely enlarged left atrium.   4. Severely increased left ventricular internal cavity size.   5. Spectral Doppler shows pseudonormal pattern of left ventricular myocardial filling (Grade II diastolic dysfunction).   6. No evidence of LV thrombus.   7. Severely reduced RV systolic function.   8. Mildly enlarged right atrium.   9. Large pleural effusion in the left lateral region.  10. Small pericardial effusion.  11. Mild mitral annular calcification.  12. Trace mitral valve regurgitation.  13. Mild tricuspid regurgitation.  14. Mild to moderate pulmonic valve regurgitation.  15. Aortic Root measuring 4.08 cm at Sinus level.      Ascending Aorta measuring 3.97 cm.  16. Aortic root measured at Sinus of Valsalva is dilated.  17. Endocardial visualization was enhanced with intravenous echo contrast.      OTHER: 	    CT:< from: CT Chest No Cont (08.17.21 @ 00:01) >  IMPRESSION:    Acute, minimally displaced fractures of the right 8th through 10th posterior ribs. Multiple additional subacute and chronic rib fractures. No pneumothorax.    Age-indeterminate mild anterior wedging of the thoracic spine. Recommended to previous outside study. If there is clinical suspicion for acute fracture, MRI may be obtained for further evaluation.    Small left greater than the right pleural effusion with atelectasis. Nonspecific interlobular septal thickening and groundglass opacities. Differential diagnosis includes pulmonary edema and pneumonia. Recommend clinical correlation.    Scattered tree-in-bud/nodular opacities in both lungs, which can be seen in small airway disease. Reticular opacities in the left upper lobe/lingula. Somewhat irregular opacity in the left lower lobe. Recommend comparison to previous outside study to assess stability. Follow-up may be obtained for further evaluation.    US:< from: US Abdomen Upper Quadrant Right (08.17.21 @ 14:14) >  IMPRESSION:    Right nephrolithiasis. No evidence of hydronephrosis.    Otherwise unremarkable exam.    LABS:	 	  CARDIAC MARKERS ( 17 Aug 2021 04:18 )  x     / 0.02 ng/mL / x     / x     / x      p-BNP 17 Aug 2021 04:18: 38869 pg/mL, CARDIAC MARKERS ( 16 Aug 2021 14:25 )  x     / 0.02 ng/mL / x     / x     / x      p-BNP 16 Aug 2021 14:25: x                              10.0   5.42  )-----------( 69       ( 18 Aug 2021 09:30 )             31.4     08-18    139  |  101  |  9.3  ----------------------------<  97  3.8   |  27.0  |  0.88    Ca    8.7      18 Aug 2021 09:30  Mg     1.8     08-18    TPro  4.6<L>  /  Alb  2.9<L>  /  TBili  1.2  /  DBili  x   /  AST  133<H>  /  ALT  71<H>  /  AlkPhos  387<H>  08-18    proBNP: Serum Pro-Brain Natriuretic Peptide: 98132 pg/mL (08-17 @ 04:18)      TELEMETRY: NSR HR @ 80-90s with occasional PVCs, and bi-fasicular block  	      
Other specified counseling    HPI:  pt. is a 60y/o male with hx HFrEF, EF 20-25% (NICM), AML s/p bone marrow transplant, FV Leiden w/ hx DVT/PE ( had about 15 years ago as per pt ) not on AC, Chronic thrombocytopenia, h/o urine retention several months ago during a hospital stay later saucedo removed but pt. kept on flomax and finasteride as per pt's girlfriend, Htn with frequent episodes of hypotension on going for many months ( as per pt. if sbp is 100 lying, will be 90 sitting and 70 on standing ) presents to the [facility] ED after multiple episodes of light-headedness and falls over the last month. Was seen at [facility] last week for similar issue and was discharged as per pt's girlfriend. Over the last month, he has felt the sensation of someone pushing him, which causes him to fall down. pt. reports no LOC with falling or head trauma, For the same period of time, he has been experiencing general fatigue, weakness, and poor appetite.  He has discussed these symptoms with his Cardiology, DR Zamudio, who has recommended holding his coreg as needed for low BP and decreased his diuretic dose. In the ER pt. is reporting no chest pain/pressure, palpitations, irregular and/or rapid heart beat, SOB, GEORGES, orthopnea, PND, cough, edema, n/v/d, fever, hemoptysis, melena or hematemesis . no HA.   As per pt. he is on injectable chemo once a month for 5 days, next is due after 1 week, follows with oncologist in [place], part of [facility].     Interval History:  Patient was seen and examined at bedside around 8 am.  Complaining of generalized weakness.   Denies chest/back pain, palpitations, shortness of breath, headache, dizziness, visual symptoms, nausea, vomiting or abdominal pain.    ROS:  As per interval history otherwise unremarkable.    PHYSICAL EXAM:  Vital Signs   T(C): 36.6 (18 Aug 2021 07:51), Max: 36.9 (17 Aug 2021 21:03)  T(F): 97.9 (18 Aug 2021 07:51), Max: 98.5 (17 Aug 2021 21:03)  HR: 88 (18 Aug 2021 04:43) (74 - 97)  BP: 96/52 (18 Aug 2021 11:50) (89/58 - 96/52)  RR: 20 (18 Aug 2021 07:51) (18 - 20)  SpO2: 90% (18 Aug 2021 07:51) (90% - 97%)  General: Elderly male lying in bed comfortably. No acute distress  HEENT: PERRLA. EOMI. Clear conjunctivae. Moist mucus membrane. Healed scar on forehead.   Neck: Supple.   Chest: CTA bilaterally. No wheezing, rales or rhonchi. No chest wall tenderness.  Heart: Normal S1 & S2. RRR.   Abdomen: Soft. Non-tender. Non-distended. + BS  Ext: No pedal edema. No calf tenderness   Neuro: Active and alert. No focal deficit. No speech disorder  Skin: Warm and Dry  Psychiatry: Normal mood and affect    I&O's Summary    18 Aug 2021 07:01  -  18 Aug 2021 13:33  --------------------------------------------------------  IN: 0 mL / OUT: 400 mL / NET: -400 mL    MEDICATIONS  (STANDING):  acyclovir   Oral Tab/Cap 400 milliGRAM(s) Oral two times a day  ALPRAZolam 0.5 milliGRAM(s) Oral at bedtime  carvedilol 3.125 milliGRAM(s) Oral every 12 hours  finasteride 5 milliGRAM(s) Oral daily  folic acid 1 milliGRAM(s) Oral daily  lactobacillus acidophilus 1 Tablet(s) Oral three times a day with meals  magnesium oxide 400 milliGRAM(s) Oral three times a day with meals  midodrine. 10 milliGRAM(s) Oral three times a day  multivitamin 1 Tablet(s) Oral daily  pantoprazole    Tablet 40 milliGRAM(s) Oral before breakfast  piperacillin/tazobactam IVPB.. 3.375 Gram(s) IV Intermittent every 8 hours  sertraline 50 milliGRAM(s) Oral daily  tamsulosin 0.4 milliGRAM(s) Oral at bedtime  torsemide 20 milliGRAM(s) Oral daily  voriconazole 200 milliGRAM(s) Oral every 12 hours    MEDICATIONS  (PRN):  albuterol/ipratropium for Nebulization 3 milliLiter(s) Nebulizer every 6 hours PRN Shortness of Breath and/or Wheezing    LABS:                        10.0   5.42  )-----------( 69       ( 18 Aug 2021 09:30 )             31.4     08-18    139  |  101  |  9.3  ----------------------------<  97  3.8   |  27.0  |  0.88    Ca    8.7      18 Aug 2021 09:30  Mg     1.8     08-18    TPro  4.6<L>  /  Alb  2.9<L>  /  TBili  1.2  /  DBili  x   /  AST  133<H>  /  ALT  71<H>  /  AlkPhos  387<H>  08-18    CARDIAC MARKERS ( 17 Aug 2021 04:18 )  x     / 0.02 ng/mL / x     / x     / x      CARDIAC MARKERS ( 16 Aug 2021 14:25 )  x     / 0.02 ng/mL / x     / x     / x        RADIOLOGY & ADDITIONAL STUDIES:  Xray Chest 1 View- PORTABLE-Urgent (08.16.21 @ 14:04)  No pneumothorax or fracture in this single view.  Small left pleural effusion/adjacent atelectasis, improved    CT Head No Cont (08.16.21 @ 13:56)   1)  involutional changes with volume loss. No acute abnormality or space-occupying lesion identified.  2)  no intracerebral hemorrhage, contusion, or extracerebral collections are identified.    CT Cervical Spine No Cont (08.16.21 @ 13:56)   1. No acute fracture identified.  2. Multilevel degenerative changes with stenosis most severe at C5-6. MR imaging of the cervical spine may be considered to assess for cord impingement and possible changes of myelopathy.  3. Moderate size left pleural effusion with multifocal airspace disease suggesting pneumonia. CT imaging of the chest is therefore recommended.    CT Chest No Cont (08.17.21 @ 00:01)   Acute, minimally displaced fractures of the right 8th through 10th posterior ribs. Multiple additional subacute and chronic rib fractures. No pneumothorax.    Age-indeterminate mild anterior wedging of the thoracic spine. Recommended to previous outside study. If there is clinical suspicion for acute fracture, MRI may be obtained for further evaluation.    Small left greater than the right pleural effusion with atelectasis. Nonspecific interlobular septal thickening and groundglass opacities. Differential diagnosis includes pulmonary edema and pneumonia. Recommend clinical correlation.    Scattered tree-in-bud/nodular opacities in both lungs, which can be seen in small airway disease. Reticular opacities in the left upper lobe/lingula. Somewhat irregular opacity in the left lower lobe. Recommend comparison to previous outside study to assess stability. Follow-up may be obtained for further evaluation.    US Abdomen Upper Quadrant Right (08.17.21 @ 14:14)   Liver: Within normal limits.  Bile ducts: Normal caliber. Common bile duct measures 3 mm.  Gallbladder: Within normal limits.  Pancreas: Visualized portions are within normal limits.  Right kidney: 10.8 cm. No hydronephrosis. 2 renal stones identified measuring 8 mm and 7 mm.  Ascites: None.  IVC: Visualized portions are within normal limits.    IMPRESSION:  Right nephrolithiasis. No evidence of hydronephrosis.  Otherwise unremarkable exam.    TTE Echo Complete w/o Contrast w/ Doppler (08.17.21 @ 12:35)    1. Left ventricular ejection fraction, by visual estimation, is <20%.   2. Severely decreased global left ventricular systolic function.   3. Severely enlarged left atrium.   4. Severely increased left ventricular internal cavity size.   5. Spectral Doppler shows pseudonormal pattern of left ventricular myocardial filling (Grade II diastolic dysfunction).   6. No evidence of LV thrombus.   7. Severely reduced RV systolic function.   8. Mildly enlarged right atrium.   9. Large pleural effusion in the left lateral region.  10. Small pericardial effusion.  11. Mild mitral annular calcification.  12. Trace mitral valve regurgitation.  13. Mild tricuspid regurgitation.  14. Mild to moderate pulmonic valve regurgitation.  15. Aortic Root measuring 4.08 cm at Sinus level.      Ascending Aorta measuring 3.97 cm.  16. Aortic root measured at Sinus of Valsalva is dilated.  17. Endocardial visualization was enhanced with intravenous echo contrast.                                
                                                               [facility]                                                               Office: [address] [address]                                                              Telephone: [phone]. Fax:[phone]                                                                             PROGRESS NOTE  Reason for follow up: frequent falls / hypotension  Overnight: No new events.   Update: 8/17: Pt denies chest pain, palpitations, SOB. Tele-NSR HR @ 80-90s with occasional PVCs, and bi-fasicular block. Cont. current medication regimen. Echo-pending.    Subjective: No new events    	  Vitals:  T(C): 36.4 (08-17-21 @ 11:10), Max: 36.6 (08-16-21 @ 20:36)  HR: 70 (08-17-21 @ 11:10) (70 - 110)  BP: 89/59 (08-17-21 @ 11:10) (74/51 - 103/71)  RR: 18 (08-17-21 @ 11:10) (16 - 22)  SpO2: 94% (08-17-21 @ 11:10) (90% - 96%)    I&O's Summary    Weight (kg): 79.4 (08-16 @ 13:09)      PHYSICAL EXAM:  Appearance: Comfortable. No acute distress  HEENT:  Head and neck: Atraumatic. Normocephalic.  Normal oral mucosa, PERRL, Neck is supple. No JVD, No carotid bruit.   Neurologic: A & O x 3, no focal deficits. EOMI   Lymphatic: No cervical lymphadenopathy  Cardiovascular: Normal S1 S2, No murmur, rubs/gallops. No JVD, No edema  Respiratory: Lungs clear to auscultation  Gastrointestinal:  Soft, Non-tender, + BS  Lower Extremities: Trace edema   Psychiatry: Patient is calm. No agitation. Mood & affect appropriate  Skin: No rashes/ ecchymoses/cyanosis/ulcers visualized on the face, hands or feet       CURRENT MEDICATIONS:  carvedilol 3.125 milliGRAM(s) Oral every 12 hours  midodrine. 10 milliGRAM(s) Oral three times a day  tamsulosin 0.4 milliGRAM(s) Oral at bedtime  torsemide 20 milliGRAM(s) Oral daily  acyclovir   Oral Tab/Cap  piperacillin/tazobactam IVPB  voriconazole  ALPRAZolam  sertraline  pantoprazole    Tablet  finasteride  folic acid  multivitamin      DIAGNOSTIC TESTING:    [ ] Echocardiogram: 11/5/20: EF 21%, severely reduced RV function    cMRI 9/3/19: EF 24%, basal septal LGE consistent with NICM    OTHER: 	    CT:< from: CT Chest No Cont (08.17.21 @ 00:01) >  IMPRESSION:    Acute, minimally displaced fractures of the right 8th through 10th posterior ribs. Multiple additional subacute and chronic rib fractures. No pneumothorax.    Age-indeterminate mild anterior wedging of the thoracic spine. Recommended to previous outside study. If there is clinical suspicion for acute fracture, MRI may be obtained for further evaluation.    Small left greater than the right pleural effusion with atelectasis. Nonspecific interlobular septal thickening and groundglass opacities. Differential diagnosis includes pulmonary edema and pneumonia. Recommend clinical correlation.    Scattered tree-in-bud/nodular opacities in both lungs, which can be seen in small airway disease. Reticular opacities in the left upper lobe/lingula. Somewhat irregular opacity in the left lower lobe. Recommend comparison to previous outside study to assess stability. Follow-up may be obtained for further evaluation.    Additional findings as described.    < from: CT Head No Cont (08.16.21 @ 13:56) >  IMPRESSION:    1)  involutional changes with volume loss. No acute abnormality or space-occupying lesion identified.  2)  no intracerebral hemorrhage, contusion, or extracerebral collections are identified.    < from: CT Cervical Spine No Cont (08.16.21 @ 13:56) >  IMPRESSION:      1. No acute fracture identified.  2. Multilevel degenerative changes with stenosis most severe at C5-6. MR imaging of the cervical spine may be considered to assess for cord impingement and possible changes of myelopathy.  3. Moderate size left pleural effusion with multifocal airspace disease suggesting pneumonia. CT imaging of the chest is therefore recommended.      LABS:	 	  CARDIAC MARKERS ( 17 Aug 2021 04:18 )  x     / 0.02 ng/mL / x     / x     / x      p-BNP 17 Aug 2021 04:18: 07189 pg/mL, CARDIAC MARKERS ( 16 Aug 2021 14:25 )  x     / 0.02 ng/mL / x     / x     / x      p-BNP 16 Aug 2021 14:25: x                              9.7    5.11  )-----------( 61       ( 17 Aug 2021 04:18 )             29.0     08-17    140  |  104  |  9.1  ----------------------------<  95  3.5   |  28.0  |  0.90    Ca    7.9<L>      17 Aug 2021 04:18    TPro  4.4<L>  /  Alb  2.7<L>  /  TBili  1.1  /  DBili  x   /  AST  95<H>  /  ALT  57<H>  /  AlkPhos  357<H>  08-17    proBNP: Serum Pro-Brain Natriuretic Peptide: 38261 pg/mL (08-17 @ 04:18)      TELEMETRY: NSR HR @ 80-90s with occasional PVCs, and bi-fasicular block   	      
Other specified counseling        HPI:  pt. is a 62y/o male with hx HFrEF, EF 20-25% (NICM), AML s/p bone marrow transplant, FV Leiden w/ hx DVT/PE ( had about 15 years ago as per pt ) not on AC, Chronic thrombocytopenia, h/o urine retention several months ago during a hospital stay later saucedo removed but pt. kept on flomax and finasteride as per pt's girlfriend, Htn with frequent episodes of hypotension on going for many months ( as per pt. if sbp is 100 lying, will be 90 sitting and 70 on standing ) presents to the [facility] ED after multiple episodes of light-headedness and falls over the last month. Was seen at [facility] last week for similar issue and was discharged as per pt's girlfriend. Over the last month, he has felt the sensation of someone pushing him, which causes him to fall down. pt. reports no LOC with falling or head trauma, For the same period of time, he has been experiencing general fatigue, weakness, and poor appetite.  He has discussed these symptoms with his Cardiology, DR Zamudio, who has recommended holding his coreg as needed for low BP and decreased his diuretic dose. In the ER pt. is reporting no chest pain/pressure, palpitations, irregular and/or rapid heart beat, SOB, GEORGES, orthopnea, PND, cough, edema, n/v/d, fever, hemoptysis, melena or hematemesis . no HA.   As per pt. he is on injectable chemo once a month for 5 days, next is due after 1 week, follows with oncologist in [place], part of [facility].     Interval History:  Patient was seen and examined at bedside around 8:30 am.  Feels very weak.   Denies chest/back pain, palpitations, shortness of breath, headache, dizziness, visual symptoms, nausea, vomiting or abdominal pain.    ROS:  As per interval history otherwise unremarkable.    PHYSICAL EXAM:  Vital Signs   T(C): 36.4 (17 Aug 2021 11:10), Max: 36.6 (16 Aug 2021 20:36)  T(F): 97.6 (17 Aug 2021 11:10), Max: 97.9 (16 Aug 2021 20:36)  HR: 70 (17 Aug 2021 11:10) (70 - 110)  BP: 89/59 (17 Aug 2021 11:10) (74/51 - 103/71)  RR: 18 (17 Aug 2021 11:10) (16 - 22)  SpO2: 94% (17 Aug 2021 11:10) (90% - 96%)  General: Elderly male lying in bed comfortably. No acute distress  HEENT: PERRLA. EOMI. Clear conjunctivae. Moist mucus membrane. Healed scar on forehead.   Neck: Supple.   Chest: CTA bilaterally. No wheezing, rales or rhonchi. No chest wall tenderness.  Heart: Normal S1 & S2. RRR.   Abdomen: Soft. Non-tender. Non-distended. + BS  Ext: No pedal edema. No calf tenderness   Neuro: AAO x 3. No focal deficit. No speech disorder  Skin: Warm and Dry  Psychiatry: Normal mood and affect    MEDICATIONS  (STANDING):  acyclovir   Oral Tab/Cap 400 milliGRAM(s) Oral two times a day  ALPRAZolam 0.5 milliGRAM(s) Oral at bedtime  carvedilol 3.125 milliGRAM(s) Oral every 12 hours  finasteride 5 milliGRAM(s) Oral daily  folic acid 1 milliGRAM(s) Oral daily  lactobacillus acidophilus 1 Tablet(s) Oral three times a day with meals  midodrine. 10 milliGRAM(s) Oral three times a day  multivitamin 1 Tablet(s) Oral daily  pantoprazole    Tablet 40 milliGRAM(s) Oral before breakfast  piperacillin/tazobactam IVPB.. 3.375 Gram(s) IV Intermittent every 8 hours  sertraline 50 milliGRAM(s) Oral daily  tamsulosin 0.4 milliGRAM(s) Oral at bedtime  torsemide 20 milliGRAM(s) Oral daily  voriconazole 200 milliGRAM(s) Oral every 12 hours    MEDICATIONS  (PRN):  albuterol/ipratropium for Nebulization 3 milliLiter(s) Nebulizer every 6 hours PRN Shortness of Breath and/or Wheezing    LABS:                       9.7    5.11  )-----------( 61       ( 17 Aug 2021 04:18 )             29.0     08-17    140  |  104  |  9.1  ----------------------------<  95  3.5   |  28.0  |  0.90    Ca    7.9<L>      17 Aug 2021 04:18    TPro  4.4<L>  /  Alb  2.7<L>  /  TBili  1.1  /  DBili  x   /  AST  95<H>  /  ALT  57<H>  /  AlkPhos  357<H>        Urinalysis Basic - ( 16 Aug 2021 18:30 )    Color: Yellow / Appearance: Clear / S.010 / pH: x  Gluc: x / Ketone: Negative  / Bili: Negative / Urobili: Negative mg/dL   Blood: x / Protein: Negative mg/dL / Nitrite: Negative   Leuk Esterase: Negative / RBC: x / WBC x   Sq Epi: x / Non Sq Epi: x / Bacteria: x    RADIOLOGY & ADDITIONAL STUDIES:  Xray Chest 1 View- PORTABLE-Urgent (21 @ 14:04)  No pneumothorax or fracture in this single view.  Small left pleural effusion/adjacent atelectasis, improved    CT Head No Cont (21 @ 13:56)   1)  involutional changes with volume loss. No acute abnormality or space-occupying lesion identified.  2)  no intracerebral hemorrhage, contusion, or extracerebral collections are identified.    CT Cervical Spine No Cont (21 @ 13:56)   1. No acute fracture identified.  2. Multilevel degenerative changes with stenosis most severe at C5-6. MR imaging of the cervical spine may be considered to assess for cord impingement and possible changes of myelopathy.  3. Moderate size left pleural effusion with multifocal airspace disease suggesting pneumonia. CT imaging of the chest is therefore recommended.    CT Chest No Cont (21 @ 00:01)   Acute, minimally displaced fractures of the right 8th through 10th posterior ribs. Multiple additional subacute and chronic rib fractures. No pneumothorax.    Age-indeterminate mild anterior wedging of the thoracic spine. Recommended to previous outside study. If there is clinical suspicion for acute fracture, MRI may be obtained for further evaluation.    Small left greater than the right pleural effusion with atelectasis. Nonspecific interlobular septal thickening and groundglass opacities. Differential diagnosis includes pulmonary edema and pneumonia. Recommend clinical correlation.    Scattered tree-in-bud/nodular opacities in both lungs, which can be seen in small airway disease. Reticular opacities in the left upper lobe/lingula. Somewhat irregular opacity in the left lower lobe. Recommend comparison to previous outside study to assess stability. Follow-up may be obtained for further evaluation.                              
[facility]  [address] [place] [address]    Patient is a 61y old  Male who presents with a chief complaint of frequent falls / hypotension (18 Aug 2021 15:27)    SUBJECTIVE / OVERNIGHT EVENTS:  Uneventful night    ROS:  CARDIAC: No cp sob or palp  RESP: No mucus production no uri symptoms  GI:  No melana no abd pain  EXTREMITIES:  no calf tenderness no edema    TELEMETRY:  sinus with sinus tach    MEDICATIONS  (STANDING):  acyclovir   Oral Tab/Cap 400 milliGRAM(s) Oral two times a day  ALPRAZolam 0.5 milliGRAM(s) Oral at bedtime  carvedilol 3.125 milliGRAM(s) Oral every 12 hours  finasteride 5 milliGRAM(s) Oral daily  folic acid 1 milliGRAM(s) Oral daily  lactobacillus acidophilus 1 Tablet(s) Oral three times a day with meals  magnesium oxide 400 milliGRAM(s) Oral three times a day with meals  midodrine. 10 milliGRAM(s) Oral three times a day  multivitamin 1 Tablet(s) Oral daily  pantoprazole    Tablet 40 milliGRAM(s) Oral before breakfast  piperacillin/tazobactam IVPB.. 3.375 Gram(s) IV Intermittent every 8 hours  sertraline 50 milliGRAM(s) Oral daily  tamsulosin 0.4 milliGRAM(s) Oral at bedtime  torsemide 20 milliGRAM(s) Oral daily  voriconazole 200 milliGRAM(s) Oral every 12 hours    MEDICATIONS  (PRN):  albuterol/ipratropium for Nebulization 3 milliLiter(s) Nebulizer every 6 hours PRN Shortness of Breath and/or Wheezing      Vital Signs Last 24 Hrs  T(C): 36.7 (19 Aug 2021 05:46), Max: 36.8 (18 Aug 2021 21:21)  T(F): 98.1 (19 Aug 2021 05:46), Max: 98.2 (18 Aug 2021 21:21)  HR: 91 (19 Aug 2021 05:46) (70 - 106)  BP: 91/63 (18 Aug 2021 21:21) (91/63 - 102/70)  BP(mean): --  RR: 18 (19 Aug 2021 05:46) (18 - 20)  SpO2: 96% (19 Aug 2021 05:46) (93% - 98%)  CAPILLARY BLOOD GLUCOSE    I&O's Summary    18 Aug 2021 07:01  -  19 Aug 2021 07:00  --------------------------------------------------------  IN: 0 mL / OUT: 400 mL / NET: -400 mL    PHYSICAL EXAM:  GENERAL: Chronically ill appearing slow to respond to questions  EYES: EOMI, PERRLA, conjunctiva and sclera clear  NECK:  No JVD  CHEST/LUNG: decreased breath sounds  HEART: RRR; No murmurs  ABDOMEN: Soft, Nontender, Nondistended; Bowel sounds present  EXTREMITIES:  2+ Peripheral Pulses, No edema  PSYCH: AAOx3  NEUROLOGY: non-focal  SKIN: No rashes or lesions. No sacral ulcer    LABS:                        10.0   5.42  )-----------( 69       ( 18 Aug 2021 09:30 )             31.4     08-19    137  |  103  |  7.9<L>  ----------------------------<  95  4.2   |  26.0  |  0.87    Ca    8.5<L>      19 Aug 2021 06:06  Mg     1.9     08-19    TPro  4.5<L>  /  Alb  2.9<L>  /  TBili  0.9  /  DBili  x   /  AST  145<H>  /  ALT  77<H>  /  AlkPhos  371<H>  08-19    < from: TTE Echo Complete w/o Contrast w/ Doppler (08.17.21 @ 12:35) >    Summary:   1. Left ventricular ejection fraction, by visual estimation, is <20%.   2. Severely decreased global left ventricular systolic function.   3. Severely enlarged left atrium.   4. Severely increased left ventricular internal cavity size.   5. Spectral Doppler shows pseudonormal pattern of left ventricular myocardial filling (Grade II diastolic dysfunction).   6. No evidence of LV thrombus.   7. Severely reduced RV systolic function.   8. Mildly enlarged right atrium.   9. Large pleural effusion in the left lateral region.  10. Small pericardial effusion.  11. Mild mitral annular calcification.  12. Trace mitral valve regurgitation.  13. Mild tricuspid regurgitation.  14. Mild to moderate pulmonic valve regurgitation.  15. Aortic Root measuring 4.08 cm at Sinus level.      Ascending Aorta measuring 3.97 cm.  16. Aortic root measured at Sinus of Valsalva is dilated.  17. Endocardial visualization was enhanced with intravenous echo contrast.    MD Be Electronically signed on 8/17/2021 at 2:37:10 PM                    
Other specified counseling    HPI:  pt. is a 60y/o male with hx HFrEF, EF 20-25% (NICM), AML s/p bone marrow transplant, FV Leiden w/ hx DVT/PE ( had about 15 years ago as per pt ) not on AC, Chronic thrombocytopenia, h/o urine retention several months ago during a hospital stay later saucedo removed but pt. kept on flomax and finasteride as per pt's girlfriend, Htn with frequent episodes of hypotension on going for many months ( as per pt. if sbp is 100 lying, will be 90 sitting and 70 on standing ) presents to the [facility] ED after multiple episodes of light-headedness and falls over the last month. Was seen at [facility] last week for similar issue and was discharged as per pt's girlfriend. Over the last month, he has felt the sensation of someone pushing him, which causes him to fall down. pt. reports no LOC with falling or head trauma, For the same period of time, he has been experiencing general fatigue, weakness, and poor appetite.  He has discussed these symptoms with his Cardiology, DR Zamudio, who has recommended holding his coreg as needed for low BP and decreased his diuretic dose. In the ER pt. is reporting no chest pain/pressure, palpitations, irregular and/or rapid heart beat, SOB, GEORGES, orthopnea, PND, cough, edema, n/v/d, fever, hemoptysis, melena or hematemesis . no HA.   As per pt. he is on injectable chemo once a month for 5 days, next is due after 1 week, follows with oncologist in [place], [facility] of [facility].     Interval History:  Patient was seen and examined at bedside around 8:15 am.  Feels OK.   Denies chest/back pain, palpitations, shortness of breath, headache, dizziness, visual symptoms, nausea, vomiting or abdominal pain.    ROS:  As per interval history otherwise unremarkable.    PHYSICAL EXAM:  Vital Signs  T(C): 36.6 (19 Aug 2021 15:10), Max: 36.8 (18 Aug 2021 21:21)  T(F): 97.8 (19 Aug 2021 15:10), Max: 98.2 (18 Aug 2021 21:21)  HR: 104 (19 Aug 2021 15:10) (70 - 106)  BP: 100/66 (19 Aug 2021 15:10) (91/63 - 102/70)  RR: 18 (19 Aug 2021 15:10) (18 - 19)  SpO2: 94% (19 Aug 2021 15:10) (93% - 98%)  General: Elderly male lying in bed comfortably. No acute distress  HEENT: PERRLA. EOMI. Clear conjunctivae. Moist mucus membrane. Healed scar on forehead.   Neck: Supple.   Chest: CTA bilaterally. No wheezing, rales or rhonchi. No chest wall tenderness.  Heart: Normal S1 & S2. RRR.   Abdomen: Soft. Non-tender. Non-distended. + BS  Ext: No pedal edema. No calf tenderness   Neuro: Active and alert. No focal deficit. No speech disorder  Skin: Warm and Dry  Psychiatry: Normal mood and affect    I&O's Summary    18 Aug 2021 07:01  -  19 Aug 2021 07:00  --------------------------------------------------------  IN: 0 mL / OUT: 400 mL / NET: -400 mL    19 Aug 2021 07:01  -  19 Aug 2021 15:43  --------------------------------------------------------  IN: 0 mL / OUT: 700 mL / NET: -700 mL    MEDICATIONS  (STANDING):  acyclovir   Oral Tab/Cap 400 milliGRAM(s) Oral two times a day  ALPRAZolam 0.5 milliGRAM(s) Oral at bedtime  carvedilol 3.125 milliGRAM(s) Oral every 12 hours  finasteride 5 milliGRAM(s) Oral daily  folic acid 1 milliGRAM(s) Oral daily  lactobacillus acidophilus 1 Tablet(s) Oral three times a day with meals  magnesium oxide 400 milliGRAM(s) Oral three times a day with meals  midodrine. 10 milliGRAM(s) Oral three times a day  multivitamin 1 Tablet(s) Oral daily  pantoprazole    Tablet 40 milliGRAM(s) Oral before breakfast  piperacillin/tazobactam IVPB.. 3.375 Gram(s) IV Intermittent every 8 hours  sertraline 50 milliGRAM(s) Oral daily  tamsulosin 0.4 milliGRAM(s) Oral at bedtime  torsemide 20 milliGRAM(s) Oral daily  voriconazole 200 milliGRAM(s) Oral every 12 hours    MEDICATIONS  (PRN):  albuterol/ipratropium for Nebulization 3 milliLiter(s) Nebulizer every 6 hours PRN Shortness of Breath and/or Wheezing    LABS:                        10.0   5.42  )-----------( 69       ( 18 Aug 2021 09:30 )             31.4     08-19    137  |  103  |  7.9<L>  ----------------------------<  95  4.2   |  26.0  |  0.87    Ca    8.5<L>      19 Aug 2021 06:06  Mg     1.9     08-19    TPro  4.5<L>  /  Alb  2.9<L>  /  TBili  0.9  /  DBili  x   /  AST  145<H>  /  ALT  77<H>  /  AlkPhos  371<H>  08-19    Blood Culture: 08-16 @ 20:52  Organism --  Gram Stain Blood -- Gram Stain --  Specimen Source .Blood Blood-Peripheral  Culture-Blood --    08-16 @ 20:51  Organism --  Gram Stain Blood -- Gram Stain --  Specimen Source .Blood Blood-Peripheral  Culture-Blood --      RADIOLOGY & ADDITIONAL STUDIES:  Xray Chest 1 View- PORTABLE-Urgent (08.16.21 @ 14:04)  No pneumothorax or fracture in this single view.  Small left pleural effusion/adjacent atelectasis, improved    CT Head No Cont (08.16.21 @ 13:56)   1)  involutional changes with volume loss. No acute abnormality or space-occupying lesion identified.  2)  no intracerebral hemorrhage, contusion, or extracerebral collections are identified.    CT Cervical Spine No Cont (08.16.21 @ 13:56)   1. No acute fracture identified.  2. Multilevel degenerative changes with stenosis most severe at C5-6. MR imaging of the cervical spine may be considered to assess for cord impingement and possible changes of myelopathy.  3. Moderate size left pleural effusion with multifocal airspace disease suggesting pneumonia. CT imaging of the chest is therefore recommended.    CT Chest No Cont (08.17.21 @ 00:01)   Acute, minimally displaced fractures of the right 8th through 10th posterior ribs. Multiple additional subacute and chronic rib fractures. No pneumothorax.    Age-indeterminate mild anterior wedging of the thoracic spine. Recommended to previous outside study. If there is clinical suspicion for acute fracture, MRI may be obtained for further evaluation.    Small left greater than the right pleural effusion with atelectasis. Nonspecific interlobular septal thickening and groundglass opacities. Differential diagnosis includes pulmonary edema and pneumonia. Recommend clinical correlation.    Scattered tree-in-bud/nodular opacities in both lungs, which can be seen in small airway disease. Reticular opacities in the left upper lobe/lingula. Somewhat irregular opacity in the left lower lobe. Recommend comparison to previous outside study to assess stability. Follow-up may be obtained for further evaluation.    US Abdomen Upper Quadrant Right (08.17.21 @ 14:14)   Liver: Within normal limits.  Bile ducts: Normal caliber. Common bile duct measures 3 mm.  Gallbladder: Within normal limits.  Pancreas: Visualized portions are within normal limits.  Right kidney: 10.8 cm. No hydronephrosis. 2 renal stones identified measuring 8 mm and 7 mm.  Ascites: None.  IVC: Visualized portions are within normal limits.    IMPRESSION:  Right nephrolithiasis. No evidence of hydronephrosis.  Otherwise unremarkable exam.    MR Cervical Spine No Cont (08.18.21 @ 22:36)   Spondylosis resulting in spinal stenosis and mild spinal cord flattening at C5-C6 as above without acute abnormality    MR Thoracic Spine No Cont (08.18.21 @ 22:54)   Mild spinal cord impingement at T6-7 secondary to tiny disc protrusion. Spondylosis elsewhere. Negative for compression fracture or cord compression.    MR Lumbar Spine No Cont (08.18.21 @ 23:24)   No acute findings. Spondylolysis and spondylolisthesis as above      TTE Echo Complete w/o Contrast w/ Doppler (08.17.21 @ 12:35)    1. Left ventricular ejection fraction, by visual estimation, is <20%.   2. Severely decreased global left ventricular systolic function.   3. Severely enlarged left atrium.   4. Severely increased left ventricular internal cavity size.   5. Spectral Doppler shows pseudonormal pattern of left ventricular myocardial filling (Grade II diastolic dysfunction).   6. No evidence of LV thrombus.   7. Severely reduced RV systolic function.   8. Mildly enlarged right atrium.   9. Large pleural effusion in the left lateral region.  10. Small pericardial effusion.  11. Mild mitral annular calcification.  12. Trace mitral valve regurgitation.  13. Mild tricuspid regurgitation.  14. Mild to moderate pulmonic valve regurgitation.  15. Aortic Root measuring 4.08 cm at Sinus level.      Ascending Aorta measuring 3.97 cm.  16. Aortic root measured at Sinus of Valsalva is dilated.  17. Endocardial visualization was enhanced with intravenous echo contrast.

## 2021-08-20 NOTE — DISCHARGE NOTE PROVIDER - HOSPITAL COURSE
61 years old male with history of Chronic Systolic Heart Failure (NICM), HTN with labile BP (frequent episodes of hypotension), AML s/p BM Transplant, Factor V Leiden Deficiency with history of DVT / PE - not on AC, Chronic Thrombocytopenia, Urinary Retention, Falls and Rib Fractures presented with dizziness and frequent falls. Admitted with Recurrent Falls. CT Chest showed acute (minimally displaced), subacute and chronic rib fractures -- asymptomatic. Advised for incentive spirometer and fall precautions. PT recommended rehab. PM&R evaluation was done and he was recommended for [facility].   For hypotension, Midodrine was added and Torsemide was decreased to once a day. Cardiology followed him closely and patient is advised to follow up with advance heart failure specialist as an outpatient.   He was found to have elevated liver enzymes -- hepatitis panel is negative and ultrasound is unremarkable. He is advised to follow up with PMD / Cardio / Onc closely as it could be secondary to medications vs chemotherapy vs hepatic congestion.   During hospitalization, he was treated for suspected pneumonia.   He is feeling better and is stable for discharge to [facility].    PHYSICAL EXAM:  Vital Signs   T(C): 36.4 (20 Aug 2021 10:05), Max: 36.7 (20 Aug 2021 05:59)  T(F): 97.5 (20 Aug 2021 10:05), Max: 98 (20 Aug 2021 05:59)  HR: 82 (20 Aug 2021 10:05) (82 - 104)  BP: 104/73 (20 Aug 2021 10:05) (100/58 - 108/60)  RR: 18 (20 Aug 2021 10:05) (18 - 18)  SpO2: 97% (20 Aug 2021 10:05) (94% - 97%)  General: Elderly male lying in bed comfortably. No acute distress  HEENT: PERRLA. EOMI. Clear conjunctivae. Moist mucus membrane. Healed scar on forehead.   Neck: Supple.   Chest: CTA bilaterally. No wheezing, rales or rhonchi. No chest wall tenderness.  Heart: Normal S1 & S2. RRR.   Abdomen: Soft. Non-tender. Non-distended. + BS  Ext: No pedal edema. No calf tenderness   Neuro: Active and alert. No focal deficit. No speech disorder  Skin: Warm and Dry  Psychiatry: Normal mood and affect    Time spent: 44 minutes

## 2021-08-20 NOTE — DISCHARGE NOTE NURSING/CASE MANAGEMENT/SOCIAL WORK - NSDCPEFALRISK_GEN_ALL_CORE
For information on Fall & injury Prevention, visit [email or web address]

## 2021-08-20 NOTE — DISCHARGE NOTE PROVIDER - NSDCCPCAREPLAN_GEN_ALL_CORE_FT
PRINCIPAL DISCHARGE DIAGNOSIS  Diagnosis: Recurrent falls  Assessment and Plan of Treatment: Fall precautions.  PT at rehab.      SECONDARY DISCHARGE DIAGNOSES  Diagnosis: Hypotension  Assessment and Plan of Treatment: Continue medications as prescribed.  Follow up with Cardio in 1 week.

## 2021-08-20 NOTE — DISCHARGE NOTE PROVIDER - PROVIDER TOKENS
PROVIDER:[TOKEN:[[id number]]],PROVIDER:[TOKEN:[[id number]]],PROVIDER:[TOKEN:[[id number]]]

## 2021-08-20 NOTE — DISCHARGE NOTE PROVIDER - CARE PROVIDERS DIRECT ADDRESSES
[email or web address]

## 2021-08-20 NOTE — DISCHARGE NOTE NURSING/CASE MANAGEMENT/SOCIAL WORK - PATIENT PORTAL LINK FT
You can access the FollowMyHealth Patient Portal offered by [facility] by registering at the following website: [email or web address]. By joining [facility]’s FollowMyHealth portal, you will also be able to view your health information using other applications (apps) compatible with our system.

## 2021-08-20 NOTE — DISCHARGE NOTE PROVIDER - CARE PROVIDER_API CALL
Jorge Zamudio)  Cardiovascular Disease; Internal Medicine  [address] [place] [address]  Phone: [phone]  Fax: [phone]  Follow Up Time:     TRINITY CAZARES  Internal Medicine  [address] [place] [address]  Phone: [phone]  Fax: [phone]  Follow Up Time:     Rosina Gaytan)  Medical Oncology  [address] [place] [address]  Phone: [phone]  Fax: [phone]  Follow Up Time:

## 2021-08-20 NOTE — DISCHARGE NOTE PROVIDER - NSDCFUADDAPPT_GEN_ALL_CORE_FT
Follow up with PMD in 1 week.  Follow up with Cardio in 1 week.  Follow up with Oncology as scheduled.    Repeat CMP in 3 days.

## 2021-08-20 NOTE — DISCHARGE NOTE PROVIDER - NSDCMRMEDTOKEN_GEN_ALL_CORE_FT
acyclovir 400 mg oral tablet: 1 tab(s) orally 2 times a day  ALPRAZolam 0.5 mg oral tablet: 1 tab(s) orally once a day (at bedtime) MDD:MDD 1 tab daily  atovaquone 750 mg/5 mL oral suspension: 5 milliliter(s) orally 2 times a day  carvedilol 3.125 mg oral tablet: 1 tab(s) orally 2 times a day  cefpodoxime 200 mg oral tablet: 1 tab(s) orally every 12 hours   finasteride 5 mg oral tablet: 1 tab(s) orally once a day  folic acid 1 mg oral tablet: 1 tab(s) orally once a day  midodrine 10 mg oral tablet: 1 tab(s) orally 3 times a day  Multiple Vitamins oral tablet: 1 tab(s) orally once a day  Pepcid 20 mg oral tablet: 1 tab(s) orally 2 times a day  Protonix 40 mg oral delayed release tablet: 1 tab(s) orally once a day  sertraline 50 mg oral tablet: 1 tab(s) orally once a day  tamsulosin 0.4 mg oral capsule: 1 cap(s) orally once a day (at bedtime)  torsemide 20 mg oral tablet: 1 tab(s) orally once a day  voriconazole 200 mg oral tablet: 1 tab(s) orally every 12 hours   acyclovir 400 mg oral tablet: 1 tab(s) orally 2 times a day  ALPRAZolam 0.5 mg oral tablet: 1 tab(s) orally once a day (at bedtime) MDD:MDD 1 tab daily  carvedilol 3.125 mg oral tablet: 1 tab(s) orally 2 times a day  cefpodoxime 200 mg oral tablet: 1 tab(s) orally every 12 hours   finasteride 5 mg oral tablet: 1 tab(s) orally once a day  folic acid 1 mg oral tablet: 1 tab(s) orally once a day  midodrine 10 mg oral tablet: 1 tab(s) orally 3 times a day  Multiple Vitamins oral tablet: 1 tab(s) orally once a day  Pepcid 20 mg oral tablet: 1 tab(s) orally 2 times a day  Protonix 40 mg oral delayed release tablet: 1 tab(s) orally once a day  sertraline 50 mg oral tablet: 1 tab(s) orally once a day  tamsulosin 0.4 mg oral capsule: 1 cap(s) orally once a day (at bedtime)  torsemide 20 mg oral tablet: 1 tab(s) orally once a day  voriconazole 200 mg oral tablet: 1 tab(s) orally every 12 hours

## 2021-08-20 NOTE — DISCHARGE NOTE PROVIDER - NSDCFUSCHEDAPPT_GEN_ALL_CORE_FT
DI CONTRERAS ; 08/24/2021 ; CARMEN [facility]  DI CONTRERAS ; 08/31/2021 ; [facility]

## 2021-09-08 PROBLEM — I50.9 CHF (CONGESTIVE HEART FAILURE): Status: ACTIVE | Noted: 2020-01-01

## 2021-09-08 NOTE — HISTORY OF PRESENT ILLNESS
[FreeTextEntry1] : Young presents for evaluation of cardiomyopathy. He has had a complicated medical history and is now s/p bone marrow transplant. He has been treated for AML including consolidation therapy in July of last year. He developed osteomyelitis of the right toe for which he underwent amputation. He has a known cardiomyopathy but had no cardiac complications throughout his hospitalization. He did have neutropenic fevers but no heart failure, dysrhythmias, or other cardiac problems. He is feeling well today and has no specific complaints.\par \par Since 7/19 LV systolic function has been deteriorating. LVEF from 40% to 20%. MR scan of the heart 9/19 demonstrated ejection fraction of 24%. Recently the patient has developed increasing water retention with weight gain, increasing shortness of breath. His Lasix was doubled and he has lost about 6 pounds of water weight.\par \par His medicines have been limited by hypotension.  He is now taking Lasix 20 mg twice a day, and carvedilol 3.1.25 mg twice a day.  Not able to take the valsartan because of hypotension.\par \par Past medical is remarkable for factor V Leiden mutation known in the family, history of DVT, pulmonary embolism, hypertension, tachycardia\par \par Social history is remarkable for smoking for approximately 45 years, stopped last year.  He has worked as a . The patient had been drinking alcohol which he stopped and unfortunately is having a high salt diet\par \par Family history is notable for both parents dying together in an accident. His father had successful bypass surgery at an older age\par \par I discussed his case with both electrophysiologist and the oncologist. Because of his present blood counts he is not considered an ideal candidate for any device or intervention. We are treating him medically.\par   \par \par He was admitted to the hospital 8/21 with hypotension and frequent falling.  Echocardiogram showed ejection fraction less than 20% with also significant reduced RV systolic function.  There was a small pericardial effusion and a large pleural effusion.  There is mild TR.  He was started on midodrine 10 mg 3 times a day for hypotension.  His medicines were adjusted and he was discharged on Coreg 3.125 mg twice a day, and torsemide 20 mg once a day.\par \par Patient is now taking torsemide 20 mg twice a day.  He is on the midodrine.  Blood pressure is extremely low today.\par \par Work 9/21 demonstrates a hemoglobin of 9.0, hematocrit 28.4, platelet count 65,000.  Normal CMP.\par \par

## 2021-09-08 NOTE — REVIEW OF SYSTEMS
[Feeling Fatigued] : feeling fatigued [Dyspnea on exertion] : dyspnea during exertion [Lower Ext Edema] : lower extremity edema [Dizziness] : dizziness [Negative] : Genitourinary [Joint Pain] : no joint pain [Rash] : no rash [Depression] : no depression [Anxiety] : no anxiety [Easy Bleeding] : no tendency for easy bleeding [Easy Bruising] : no tendency for easy bruising

## 2021-09-08 NOTE — PHYSICAL EXAM
[General Appearance - Well Developed] : well developed [Normal Appearance] : normal appearance [Well Groomed] : well groomed [General Appearance - Well Nourished] : well nourished [No Deformities] : no deformities [General Appearance - In No Acute Distress] : no acute distress [Normal Conjunctiva] : the conjunctiva exhibited no abnormalities [Eyelids - No Xanthelasma] : the eyelids demonstrated no xanthelasmas [Normal Oral Mucosa] : normal oral mucosa [No Oral Pallor] : no oral pallor [No Oral Cyanosis] : no oral cyanosis [Normal Jugular Venous A Waves Present] : normal jugular venous A waves present [Normal Jugular Venous V Waves Present] : normal jugular venous V waves present [No Jugular Venous Rosario A Waves] : no jugular venous rosario A waves [Respiration, Rhythm And Depth] : normal respiratory rhythm and effort [Exaggerated Use Of Accessory Muscles For Inspiration] : no accessory muscle use [Abdomen Soft] : soft [Abdomen Tenderness] : non-tender [Abdomen Mass (___ Cm)] : no abdominal mass palpated [Abnormal Walk] : normal gait [Gait - Sufficient For Exercise Testing] : the gait was sufficient for exercise testing [Skin Color & Pigmentation] : normal skin color and pigmentation [] : no rash [No Venous Stasis] : no venous stasis [Skin Lesions] : no skin lesions [No Skin Ulcers] : no skin ulcer [No Xanthoma] : no  xanthoma was observed [Oriented To Time, Place, And Person] : oriented to person, place, and time [Affect] : the affect was normal [Mood] : the mood was normal [No Anxiety] : not feeling anxious [5th Left ICS - MCL] : palpated at the 5th LICS in the midclavicular line [Normal] : normal [No Precordial Heave] : no precordial heave was noted [Normal Rate] : normal [Heart Rate ___] : [unfilled] bpm [Rhythm Regular] : regular [Normal S1] : normal S1 [Normal S2] : normal S2 [No Gallop] : no gallop heard [No Murmur] : no murmurs heard [2+] : left 2+ [1+] : left 1+ [No Abnormalities] : the abdominal aorta was not enlarged and no bruit was heard [___ +] : bilateral [unfilled]U+ pitting edema to the ankles [FreeTextEntry1] : s/p right big toe amputation [Apical Thrill] : no thrill palpable at the apex [S3] : no S3 [S4] : no S4 [Click] : no click [Pericardial Rub] : no pericardial rub [Right Carotid Bruit] : no bruit heard over the right carotid [Right Femoral Bruit] : no bruit heard over the right femoral artery [Rt] : no varicose veins of the right leg [Lt] : no varicose veins of the left leg

## 2021-09-08 NOTE — CARDIOLOGY SUMMARY
[LVEF ___%] : LVEF [unfilled]% [Severe] : severe LV dysfunction [None] : no mitral regurgitation [Normal] : normal [___] : [unfilled] [___] : [unfilled]

## 2021-09-08 NOTE — DISCUSSION/SUMMARY
[FreeTextEntry1] : Despite medication the patient's heart is deteriorating, and he cannot tolerate much medication because of severe hypotension.  Going to try stopping his alpha blockers.  We will start the finasteride if he still cannot pass urine.  His tamsulosin.  Reduce the torsemide to 20 mg once a day and continue the midodrine.\par \par I am going to speak with the oncologist and the congestive heart failure doctors at [place] to see if he could be a candidate for some type of intervention since medicines are not working.  Will consider either a biventricular pacemaker or left ventricular assist device.  Not sure if he is a candidate but I will try to make arrangements to get him evaluated.\par \par This was discussed with the patient and his friend and I answered their questions.\par \par

## 2021-09-10 NOTE — PROGRESS NOTE ADULT - PROBLEM SELECTOR PROBLEM 1
AML (acute myeloid leukemia) fell out of back of 4-5ft trailer at work. c/o right lower back pain, right elbow pain and headache. Denies loc. Abrasion on right elbow. Denies dizziness.

## 2021-09-13 PROBLEM — I95.9 HYPOTENSION: Status: ACTIVE | Noted: 2021-01-01

## 2021-09-15 NOTE — PHYSICAL EXAM
[Ambulatory and capable of all self care but unable to carry out any work activities] : Status 2- Ambulatory and capable of all self care but unable to carry out any work activities. Up and about more than 50% of waking hours [No active (erythematous_ GVHD rash)] : Skin: No active (erythematous_ GVHD rash) [< 2 mg/dl] : Liver: < 2 mg/dl [No or intermittent] : Upper GI: No or intermittent nausea, vomiting or anorexia [<500 ml/day or < 3 episodes/day] : Lower GI (stool output/day): <500 ml/day or <3 episodes/day [I] : I [No] : No [Normal] : normoactive bowel sounds, soft and nontender, no hepatosplenomegaly or masses appreciated [Total % ____] : Total: [unfilled]% [de-identified] : Crackles on left lower lobe,dry cough occasionally [de-identified] : Light headed [de-identified] : No edema or pain to palpation on sacral area  [de-identified] : Erythematous rash of bilateral upper and lower extremities, chest, back resolved...now hyperpigmented.Generalized Ecchymoses. [FreeTextEntry1] : 10/9/19 [OnsetofaGVHD] : 3/8/21

## 2021-09-15 NOTE — HISTORY OF PRESENT ILLNESS
[de-identified] : Mr. Delong is a 58 y/o male, with a previously diagnosed acute myeloid leukemia. A bone marrow biopsy from 7/3 revealed Acute myeloid leukemia (AML). FISH - report shows a population of aberrant myeloid blasts. He is currently being treated by Dr. Coelho for high risk AML with HIDAC consolidation chemotherapy. He was referred by Dr. Coelho for an initial consultation of a Haplo- transplant. \par \par The patient has a history of multiple blood clots - factor V Leiden runs in the family. He was diagnosed with sleep apnea, but refuses to use CPAP. He is a former smoker, 40 years. He also has a past medical history of PE, HTN, cardiomyopathy. He recently underwent an amputation of the right first toe due to an infection.\par \par S/p hospitalization at [facility] 10/3/19 - 11/1/19 for haplo (son) SCT. \par Upon admission, a TLC was placed in IR. Mr. Delong received IV fluid hydration, pain management, nutritional support, anxiolysis, antiemetics, and antiviral,  antifungal, antibacterial, GI, PCP and VOD prophylaxis. When his ANC dropped  below 1000, he was started on prophylactic Cefepime. Labs were monitored on a daily basis and he received transfusional support and electrolyte repletion as needed. \par \par While in patient, Mr. Delong was continued on his Voriconazole for history of aspergillosis. 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 \par Upon admission, a TLC was placed in IR. Mr. Delong received IV fluid hydration, pain management, nutritional support, anxiolysis, antiemetics, and antiviral, antifungal, antibacterial, GI, PCP and VOD prophylaxis. When his ANC dropped below 1000, he was started on prophylactic Cefepime. Labs were monitored on a daily basis and he received transfusional support and electrolyte repletion as needed. \par \par While in patient, Mr. Delong was continued on his Voriconazole for history of aspergillosis. \par \par During admission, Mr. Delong had pancytopenia related to the high dose chemotherapy prep regimen. He also experienced neutropenic fevers. When he became febrile, blood and urine cultures were sent and a CXR was completed which were unremarkable. \par \par On 10/9/2019, following premedications, pt received 250 ml of allogeneic, related, haplo, fresh, mobilized HPC apheresis over 30-60 minutes. \par Cell counts as follows: \par \par Total MNCs (x10^8/kg) = 5.17 \par CD 34 cells (x10^6/kg) = 7.34 \par CD 3 Cells (x 10^7/kg) = 19.81 \par Cell viability (%) = 100 \par \par Pt tolerated infusion without any adverse reaction or complications. \par \par Engraftment was noted on 10/28/2019. Daily Zarxio was discontinued, as was Cefepime given negative cultures. Mr. Delong was discharged home to f/u at the [facility].  [de-identified] : On 12/16/20 visit, presents for Vidaza this week 12/14-12/19. Overall feeling OK. Ongoing fatigue. Stable SOB with exertion. Reports adequate PO intake and hydration. Drinking 1 glass of wine per week. Intermittent pruritic rash of upper back, intermittently using steroid cream. Trace BLE edema waxes and wanes, currently taking Lastix 40mg bid. Compliant with post transplant meds and restrictions. Denies fever, chills, headache, dizziness, blurred vision, mucositis/odynophagia, chest pain, cough, nausea/vomiting, diarrhea/constipation, abdominal pain, dysuria, bleeding, pain\par \par On 12/29/20, patient presents for a follow up visit. Overall Young is well and offers no acute concerns. Mild erythema of bilateral antecubital fossa. Denies fever, chills, nausea, vomiting, diarrhea, rash, mouth sores, dysuria or any signs of active bleeding. Denies SOB or chest pain. Mild edema of bilateral lower extremities. Remains compliant with medications as prescribed. \par \par On 1/13/21, day + 462 post stem cell transplant. Overall patient is well and offers no acute concerns. Patient is receiving cycle 7 of vidaza this week. Continues to have mild erythema of bilateral antecubital fossa but, admits does not apply hydrocortisone cream BID. Denies fever, chills, nausea, vomiting, diarrhea, rash, mouth sores, dysuria or any signs of active bleeding. Denies SOB or chest pain. Mild edema of bilateral lower extremities. Remains compliant with medications as prescribed. \par \par 1/27/21 Pt doing well...no new issues....tolerating vidazza...\par \par On 3/8/21, patient presents for a follow up visit. Young complains of an erythematous rash of bilateral lower extremities and chest. Patient is receiving cycle 9 of vidaza this week. Denies fever, chills, nausea, vomiting, diarrhea, mouth sores, dysuria or any signs of active bleeding. Denies SOB, chest pain or B/L LE edema. Remains compliant with post transplant medications. \par \par On 4/5/21, patient presents for a follow up visit. Young has an erythematous rash of bilateral upper and lower extremities, chest and back. Went to dermatology recently and had  punch biopsies of bilateral lower extremities. Has been applying lidex cream and triamcinolone cream. States does not have an appetite. Denies fever, chills, nausea, vomiting, diarrhea, mouth sores, dysuria or any signs of active bleeding. Denies SOB, chest pain or B/L LE edema. \par \par Today here for f/u..looks well overall...Ashlyn is on the phone..rash is waxing and waning...compliant with creams...no n/v/d..skin bx c/w GVHD..overall improved\par \par On 9/7/21 Mr. Delong presents for a follow up visit.Spoke to Ashlyn over the phone.He feels light headed at times and has frequent falls.As per the medical assistant, he slid down from the chair during today's visit and landed on the floor in sitting position.He is on Midodrine 10mg three time a day and has a follow up appointment with Cardiology tomorrow.Denies fever, chills, nausea, vomiting,diarrhea,mouth sores,dysuria,or any signs of active bleeding.Denies SOB or chest pain.He has bilateral lower extremity edema +2.He is on Torsemide 40mg daily.

## 2021-09-15 NOTE — ASSESSMENT
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 with the following comorbidities being managed:\par \par 1) AML\par S/p haplo (son) PSCT on 10/9/19\par 6/5/20 chimerism = 74.3% donor\par 6/23/20 chimerism = 67.7% donor (CD33 = 30% and CD3 = 100% donor)\par 6/25/20 BMbx: VINCE, normal male karyotype \par 7/8/20 chimerism = 57.7% donor (CD3 = 99% and CD33 = 10% donor)\par 7/21/20 chimerism = 59.3% donor (CD3 = 98.3% and CD33 = 7.3% donor)\par 8/13/20 chimerism = 56.7 % donor\par 9/2020 chimerism = 53%\par 9/24/20 chimerism = 55% donor\par 10/8/20 chimerism = 53%\par 10/22/20 chimerism = 38.3 % donor (CD 3 = 99% and CD 33= 1% donor)\par 11/4/20 chimerism = 51.3 % donor (CD 3 = 99% and CD 33= 3% donor)\par 12/3/20 chimerism = 41% donor (CD3 = 99% and CD33 = 1% donor)\par 12/14/20 chimerism= 36.7 % donor ( CD 3= 99% and CD 33=0.3% donor)\par 12/29/20 chimerism= 36.3 % donor ( CD 3= 99% and CD 33= 0.3% donor)\par 1/15/21 36%\par 2/8/21 chimerism= 30% donor ( CD 3 = 98% and CD 33= 0% donor)\par 3/8/21 chimerism= 28.3 % donor ( CD 3= 99% and CD 33=1% donor)\par 4/2021 39% donor...last one on whole bloodbelow 30....cd 3 100...cd33 0\par Current disease status: VINCE based on 6/25/20 BMbx and ongoing peripheral blood work.\par \par In setting of dropping chimerism, SQ Vidaza 32mg/m2 daily x 5 days every 28 days started 7/27/20 \par    Cycle 2 - 8/24-8/28 \par    Cycle 3 - 9/21-9/25\par    Cycle 4 - 10/19 - 10/23\par    Cycle 5 - 11/16 - 11/20\par    Cycle 6 - 12/14 -12/19\par    Cycle 7- 1/11/21- 1/15/21\par    Cycle 8- 2/8/21- 2/12/21\par     Cycle 9- 3/8/21- 3/12/21\par    Cycle 10 - 4/5/21- 4/9/21\par    C 12 this week\par \par S/p DLI #1 on 8/13/20\par S/P DLI #2 on 10/8/20\par S/p DLI #3 on 12/3/20\par \par 2) Heme\par ABO compatible transplant: pt and donor are both A pos\par Ongoing anemia and thrombocytopenia, poor graft function with dropping CD 33 chimerism \par Continue multivitamin and folic acid daily\par \par Hx of recurrent DVT/PE\par Factor V Leiden reportedly runs in the family \par 11/21/19 Doppler US of RUE negative for DVT\par Doppler US of BLEs on 7/2/20 negative for DVT\par Xarelto on hold while pancytopenic, to resume when PLT consistently >100K\par \par 3) ID\par Continue ppx:\par - Acyclovir 400 mg bid \par - Voriconazole 200 mg bid - hx of aspergillosis \par -Mepron d/c'd 12/16/20 \par -Post transplant crowd and dietary restrictions reviewed - in light of covid-19, CDC recommendations reviewed  and strict restrictions reinforced \par \par COVID Vaccine:\par Received Moderna COVID vaccine on 1/16/21 and 2/13/21\par \par \par CMV viremia\par CMV PCR peaked at 8,581 on 11/20/19\par Valcyte 450mg BID started 11/21/19. Decreased to QD as of 12/17/19. D/c'd 2/14/20 for ongoing thrombocytopenia \par 12/14/20 CMV PCR not detected \par 12/29/20 CMV PCR not detected\par 2/8/21 CMV PCR not detected\par Continue to monitor PCR at ever visit \par \par 4) GVHD\par Skin 0  Liver 0   GI 0 - overall grade 0.....\par cont topical steroid cream prn\par No signs of chronic GVHD at this time\par Off MMF as of 11/22/19 \par Tacrolimus discontinued on 6/15/20\par Prednisone discontinued on 6/15/20\par Reviewed signs/symptoms of GVHD (i.e. rash, mucositis, nausea/vomiting, diarrhea)\par \par aGVHD of skin \par Maculopapular pruritic rash of face, neck, and upper chest (BSA 19%) noted 1/21/20 in setting of tapering prednisone (for FTT) - max skin stage 1, overall grade 1\par 1/21/20: rash on face, neck, and chest (BSA 19%); skin 1, overall grade 1; prednisone increased to 10mg daily\par 2/14/20: rash on neck, chest, and bilateral upper arms (BSA 18%); skin 1, overall grade 1: prednisone increased to 20mg daily\par 2/21/20: rash on neck and chest (10% BSA); skin 1, overall grade 1. Continue prednisone 20mg daily\par 3/12/20: rash on neck and chest (10% BSA); skin 1, overall grade 1. Continue prednisone 15mg daily  \par 3/19/20: rash on neck and chest (10% BSA); skin 1, overall grade 1. Continue prednisone 15 mg daily\par 4/2/20: rash on neck and chest (10% BSA); skin 1, overall grade 1. Continue prednisone 15 mg daily\par 4/9/20: rash resolved, prednisone decreased to 10mg daily\par 4/17/20: rash on neck and chest (10% BSA); skin 1, overall grade 1. Continue prednisone 10mg daily and reinforced compliance with triamcinolone cream bid \par 5/8/20: waxing and waning pruritic erythema of upper back, unsure if GVHD. Continue prednisone 10mg daily at this time\par 5/14/20: mild erythema of neck and upper back ( 5% BSA); skin 1, overall grade 1. Continue prednisone 10 mg daily\par 5/22/20: faint pruritic erythema of upper chest and upper back (9% BSA); skin 1, overall grade 1. Prednisone decreased 5mg daily for drop in chimerism \par 6/5/20: monitor very faint erythema of chest and upper back. Continue triamcinolone 0.1% cream bid PRN rash \par Continue to monitor closely \par 6/23/20: Mild erythema of upper back and bilateral upper extremities.Continue triamcinolone 0.1% cream bid PRN rash \par 7/2/20: pruritic erythema of upper back, chest, and bilateral upper arms (36% BSA). Defer starting oral immunosuppression at this time given drop in chimerism. Start Claritin daily and Pepcid bid. Will attempt to attain insurance auth for Lidex 0.1% cream bid, continue to monitor closely\par 7/8/20: Pruritic erythema of chest and bilateral upper arms (18% BSA). Patient will pay out of pocket today for lidex cream. To be applied BID. Continue claritin and pepcid. \par 7/21/20: no rash \par 7/30/20: erythematous rash of chest (9% BSA), continue Lidex cream bid \par 8/27/20: Erythematous rash of chest and back ( 9% BSA). Continue lidex cream BID\par 9/8/20: erythematous rash of BUEs (18% BSA), continue Lidex cream bid \par 3/8/21: Mild erythematous rash of bilateral lower extremities and chest. BSA 22.5%. Recommended patient to apply bacitracin and hydrocortisone cream BID. Patient is aware to call office if rash worsens. \par 4/5/21, 5/4/21: Erythematous rash of bilateral upper and lower extremities, chest, back. Continue lidex cream to chest, back and upper extremities. Continue triamcinolone cream to B/L LE extremities as prescribed by dermatologist. 50%\par \par 3/17/21: Right and Left leg punch biopsy of legs\par Right leg- Lichenoid dermatitis with epidermal hyperplasia. Note: the favored diagnosis on histological grounds alone is hypertrophic lichen planus. However, given the clinical information provided, a hypertrophic manifestation of graft vs host disease and a lichenoid drug eruption remain in the differential diagnosis.\par Left Leg- Lichenoid dermatitis with epidermal hyperplasia. Note: the favored diagnosis on histological grounds alone in hypertropic lichen planus. However, given the clinical information provided, a hypertrophic manifestation of graft vs host disease and a lichenoid drug eruption remain in the differential diagnosis.\par \par Recently finished a 10 day course of Keflex prescribed on 3/15/21 for bilateral lower extremities.\par \par 9/7/21: No rash\par  \par 5) GI\par Continue ppx:\par - Protonix 40 mg daily\par - Ursodiol 300 mg bid\par    PRN Zofran and Reglan for nausea/vomiting\par \par 6) Cardiac\par HTN \par Soft BP with lightheadedness\par Metoprolol succinate 25 mg daily - discontinued by cardiology\par Cardiomyopathy\par ECHO completed on  6/24/20, ordered by cardiology\par ECHO completed on 11/5/20 - Left ventricular ejection fraction of 20%\par Continue meds as prescribed:\par - Carvedilol 6.25 mg bid\par - Valsartan 40 mg daily HOLDING as per cardiology\par - Torsemide 40 mg bid\par -Midodrine 10mg three times daily\par Continue f/u with cardiology \par \par Shortness of breath\par STAT chest xray completed on 11/4/20 - heart enlarged with small bilateral pleural effusions\par Increased lasix to 80 mg BID on 11/4/20 for two days. Then decrease lasix to 40 mg BID.\par Echo scheduled for 11/5/20. Patient and patients girlfriend aware patient needs to see Dr Lyn. \par Patient is aware he cannot have any  ETOH . met with patient and explained patient cannot drink ETOH. Contributing to fluid overload and heart failure. \par RESOLVED\par \par 7) Other\par BPH - continue finasteride 5mg daily and tamsulosin 0.4 mg daily \par Anxiety/Depression - continue Zoloft 50 mg daily\par Insomnia - continue PRN Xanax 0.5-1 mg qhs\par Pruritus - continue Pepcid 20 mg bid and Zyrtec 10 mg daily \par Frequent Falls- Advised to make sure that he is not light headed before he gets up to stand or walk;Continue Midodrine as prescibed\par \par 8) Plan/Dispo\par Pt educated regarding plan of care, all questions/concerns addressed\par Vidaza on hold secondary to hypotension and frequent falls\par call if rash appears ...hope to see chimerism rise further....consider subsequent dli??\par Follow up with Cariology 9/8/21\par Follow up with Dr. Gaytan on 9/21/21

## 2021-09-15 NOTE — REVIEW OF SYSTEMS
[Negative] : Allergic/Immunologic [Cough] : cough [Chest Pain] : no chest pain [Palpitations] : no palpitations [Leg Claudication] : no intermittent leg claudication [Lower Ext Edema] : no lower extremity edema [Shortness Of Breath] : no shortness of breath [Wheezing] : no wheezing [SOB on Exertion] : no shortness of breath during exertion [FreeTextEntry6] : Crackles on left lower lobe,dry cough occasionally [FreeTextEntry7] : No appetite [de-identified] : Erythematous rash of bilateral upper and lower extremities, chest, back resolved...now hyperpigmented.Generalized Ecchymoses

## 2021-09-20 PROBLEM — G47.33 OBSTRUCTIVE SLEEP APNEA, ADULT: Status: ACTIVE | Noted: 2018-02-02

## 2021-09-20 PROBLEM — Z86.718 H/O BLOOD CLOTS: Status: ACTIVE | Noted: 2019-11-08

## 2021-09-20 PROBLEM — D69.6 THROMBOCYTOPENIA: Status: ACTIVE | Noted: 2020-01-10

## 2021-09-20 PROBLEM — Z94.81 BONE MARROW TRANSPLANT STATUS: Status: ACTIVE | Noted: 2021-01-01

## 2021-09-20 PROBLEM — R60.0 BILATERAL LOWER EXTREMITY EDEMA: Status: ACTIVE | Noted: 2020-07-05

## 2021-09-20 PROBLEM — I50.23 ACUTE ON CHRONIC SYSTOLIC HEART FAILURE, NYHA CLASS 3: Status: ACTIVE | Noted: 2021-01-01

## 2021-09-20 PROBLEM — I42.8 NON-ISCHEMIC CARDIOMYOPATHY: Status: ACTIVE | Noted: 2021-01-01

## 2021-09-20 PROBLEM — I50.22 ACC/AHA STAGE C CHRONIC SYSTOLIC HEART FAILURE: Status: ACTIVE | Noted: 2021-01-01

## 2021-09-20 NOTE — PHYSICAL EXAM
[Frail] : frail [Ill-Appearing] : ill-appearing [Cachectic] : cachexia was observed [Elevated JVD ____cm] : elevated JVD ~Vcm [Normal S1, S2] : normal S1, S2 [No Murmur] : no murmur [No Rub] : no rub [Clear Lung Fields] : clear lung fields [Soft] : abdomen soft [Non Tender] : non-tender [No Masses/organomegaly] : no masses/organomegaly [Normal Bowel Sounds] : normal bowel sounds [Alert and Oriented] : alert and oriented [de-identified] : tachycardic heart sounds [de-identified] : b/l LE edema up to mid leg  [de-identified] : Diffuse erythematous rash

## 2021-09-20 NOTE — HISTORY OF PRESENT ILLNESS
[FreeTextEntry1] : 60 y/o male with EXTENSIVE pmh remarkable for chrnic systolic HF ACC/AHA stage C-D, presumed NICMP LVEF <20% LVIDd 7cm, AML s/p SCT '19, factor V mutation with h/o DVT/PE, osteomyelitis with R toe amputation, h/o aspergillosis/CMV viremia, thrombocytopenia, former smoker and COLLEEN not on CPAP who was referred for HF management. \par \par Mr. Delong reports worsening symptoms for the past ~9 months. He reports fatigue, tiredness, GEORGES, orthopnea associated to intermittent LE edema and hypotension. In the past few months he's required uptitration of diuretics and downtitration of GDMT due to hypotension. He was prescribed midodrine which he uses PRN. He also reports multiple falls in setting of unsteadiness and lightheadedness. He denies LOC, syncope, palpitations or chest pain. Upon review of his cardiac imaging, his TTE in 2019 demonstrated LVEF 42%. However a cMRI at that time showed LVEF 24% with mild wall LGE in the anteroseptal and inferoseptal walls suggestive of nonischemic cardiomyopathy. Serial echocardiograms continued to show progressive worsening of LVEF and LV dilation. He also reports 2 hospitalizations in the past 3 months (July [facility] and August [facility]) in setting of weakness, hypotension and CHF exacerbation. \par \par He also has a complex noncardiac history associated to h/o AML s/p chemo and SCT on 10/2019. As per hematology notes, it seems he's had multiple complications with pancytopenia, infections and ?poor graft function. He was on chemotherapy but it's currently on hold due to his active cardiac issues. \par \par He reports his father had a CABG at an older age otherwise no FH of cardiac disease. HE smoked cigarettes in the past for ~45 years but he quit in 2019. His wife states he drinks etoh, used to drink up 8 drinks a day. Patient states he currently drinks 1-2 glasses a day. No illicit drug use. \par \par

## 2021-09-20 NOTE — DISCUSSION/SUMMARY
[___ Month(s)] : in [unfilled] month(s) [FreeTextEntry1] : \par 60 y/o male with EXTENSIVE pmh remarkable for chornic systolic HF ACC/AHA stage C-D, presumed NICMP LVEF <20% LVIDd 7cm, AML s/p SCT '19, factor V mutation with h/o DVT/PE, osteomyelitis with R toe amputation, h/o aspergillosis/CMV viremia, thrombocytopenia, former smoker and COLLEEN not on CPAP who was referred for HF management. He reports significant worsening in functional class associated to uptitration of diuretics and downtitration of GDMT due to low BP. CLinically is hypervolemic with lukewarm extremities. Has clear lcinical evidence of advanced disease however he is not a candidate for heart tx or LVAD due to multiple comorbidities. Can consider palliative inotropes however his h/o prior multiple infections is concerning. His prognosis from the hematologic standpoint is unclear. Needs multidisciplinary approach with hematology and general cards. I have left messages to both Diane Zamudio and Lukas. \par In the meantime will uptitrate diuretics and switch coreg to toprol to try to minimize hypotensive episodes. \par \par Plan as above. \par

## 2021-09-20 NOTE — ASSESSMENT
[FreeTextEntry1] : #Acute on chronic systolic HF ACC/AHA stage C-D, NYHA III\par Presumed NICMP LVEF <20% LVIDd 7cm\par Clinically hypervolemic, lukewarm extremities\par GDMT: stop coreg, start Toprol 12.5mg QHS, hold if SBP < 90 mmHg. Can consider MRA. Unable to use ACEi/ARNi/ARB/SGLT2i due to hypotension.\par Should avoid use of midodrine if possible, currently using 10mg TID PRN \par Diuretics: increase torsemide to 40mg daily. \par Patient will call if there is no weight loss or improvement on LE edema by Thursday 9/24.\par Device: not ideal candidate for ICD due to thrombocytopenia and will also be based on prognosis\par Has clear clinical evidence of advanced disease. His multiple comorbidities preclude heart tx. His thrombocytopenia, inability to take anticoagulation, RV dysfunction, frailty and h/o infections make him a suboptimal candidate for LVAD. Can consider palliative inotropes, however would like to discuss with his hematologist and primary cardiologist Dr. Zamudio. Picc line placement may also increase the risk of infections. \par I have discussed shared decision making model with the patient and his spouse. Given his complex medical history he needs a multidisciplinary approach. \par IF patient and his medical team agrees with inotropic support will plan for RHC. \par CardioMEMs would be helpful however patient cannot take AC/antiplatelets and has h/o factor V mutation/DVT/PE. \par HF education provided including lifestyle changes (low Na diet, fluid restriction, increase physical activity), current clinical condition, natural progression and prognosis.\par Can consider cardiac rehab once euvolemic. \par \par \par # AML s/p chemo followed by SCT (son) 10/2019\par Off MMF/tacro/prednisone\par As per notes, poor graft function w dropping chimerism\par Was on Vidaza - currently on hold\par I've left a message to Dr. Gaytan. \par \par #Factor V with h/o DVT/PE\par AC on hold due to thrombocytopenia\par \par #Infectious disease/Prophylaxis\par H/o Aspergillosis and 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\par On voriconazole/Acyclovir\par H/o pancytopenia/neutropenia while on chemo\par \par #COLLEEN not on CPAP\par \par #Toxins\par Discussed importance of etoh abstinence\par Quit smoking in 2019

## 2021-09-20 NOTE — CARDIOLOGY SUMMARY
[de-identified] : 8/17/21 [facility] ?old IMI [de-identified] : 8/17/2021: LVEF <20%, LVIDd 7cm, trace MR, mild TR, RVSP 28 mmHg (RAP 3 mmHg)\par \par 11/2020: LVEF 21% LVIDd ?6.03cm, GLS Avg -7.9%,  mild MR, RV mildly enlarged w severely decreased systolic function (S ' 0.9 m/s, TAPSE 0.7cm ), moderate TR, RVSP 37mmHg (RAP ~10 mmHg)\par \par 6/2020 LVEF 31% LVIDd 7.3cm, mild MR, grade DD, nl RV size/function, RVSP 51mmHg (RAP ~8 mmHg) \par \par 7/2019 LVEF 42% LVIDd 7.0 cm, mild MR, Nl RV size/function. [de-identified] : \par 2019 cMRI: LVEF 24%, LV diameter 5.3cm, globally hypokinetic, CO/CI 4.79/2.31. Mid wall myocardial LGE at the base involving anteroseptal and inferoseptal wall is associated with a nonischemic cardiomyopathy. Nl LV thrombus.  [de-identified] : \par LHC done in 2010 which showed no CAD (as per prior notes-?[facility])

## 2021-09-20 NOTE — REASON FOR VISIT
[Cardiac Failure] : cardiac failure [Spouse] : spouse [FreeTextEntry3] : Jorge Zamudio MD [FreeTextEntry1] : \par Cards: Jorge Zamudio MD\par \par Hematology: Rosina Gaytan MD

## 2021-09-21 PROBLEM — D89.813 GVHD (GRAFT-VERSUS-HOST DISEASE): Status: ACTIVE | Noted: 2020-02-15

## 2021-09-21 PROBLEM — Z86.711 HISTORY OF PULMONARY EMBOLISM: Status: RESOLVED | Noted: 2018-02-02 | Resolved: 2021-01-01

## 2021-09-21 PROBLEM — B25.9 CMV (CYTOMEGALOVIRUS): Status: ACTIVE | Noted: 2019-11-22

## 2021-09-21 PROBLEM — I42.9 CARDIOMYOPATHY: Status: ACTIVE | Noted: 2019-06-27

## 2021-09-21 PROBLEM — D68.51 FACTOR V LEIDEN MUTATION: Status: ACTIVE | Noted: 2021-01-01

## 2021-09-22 NOTE — HISTORY OF PRESENT ILLNESS
[de-identified] : Mr. Delong is a 60 y/o male, with a previously diagnosed acute myeloid leukemia. A bone marrow biopsy from 7/3 revealed Acute myeloid leukemia (AML). FISH - report shows a population of aberrant myeloid blasts. He is currently being treated by Dr. Coelho for high risk AML with HIDAC consolidation chemotherapy. He was referred by Dr. Coelho for an initial consultation of a Haplo- transplant. \par \par The patient has a history of multiple blood clots - factor V Leiden runs in the family. He was diagnosed with sleep apnea, but refuses to use CPAP. He is a former smoker, 40 years. He also has a past medical history of PE, HTN, cardiomyopathy. 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 \par Upon admission, a TLC was placed in IR. Mr. Delong received IV fluid hydration, pain management, nutritional support, anxiolysis, antiemetics, and antiviral,  antifungal, antibacterial, GI, PCP and VOD prophylaxis. When his ANC dropped  below 1000, he was started on prophylactic Cefepime. Labs were monitored on a daily basis and he received transfusional support and electrolyte repletion as needed. \par \par While in patient, Mr. Delong was continued on his Voriconazole for history of aspergillosis. 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 \par Upon admission, a TLC was placed in IR. Mr. Delong received IV fluid hydration, pain management, nutritional support, anxiolysis, antiemetics, and antiviral, antifungal, antibacterial, GI, PCP and VOD prophylaxis. When his ANC dropped below 1000, he was started on prophylactic Cefepime. Labs were monitored on a daily basis and he received transfusional support and electrolyte repletion as needed. \par \par While in patient, Mr. Delong was continued on his Voriconazole for history of aspergillosis. \par \par During admission, Mr. Delong had pancytopenia related to the high dose chemotherapy prep regimen. He also experienced neutropenic fevers. When he became febrile, blood and urine cultures were sent and a CXR was completed which were unremarkable. \par \par On 10/9/2019, following premedications, pt received 250 ml of allogeneic, related, haplo, fresh, mobilized HPC apheresis over 30-60 minutes. \par Cell counts as follows: \par \par Total MNCs (x10^8/kg) = 5.17 \par CD 34 cells (x10^6/kg) = 7.34 \par CD 3 Cells (x 10^7/kg) = 19.81 \par Cell viability (%) = 100 \par \par Pt tolerated infusion without any adverse reaction or complications. \par \par Engraftment was noted on 10/28/2019. Daily Zarxio was discontinued, as was Cefepime given negative cultures. Mr. Delong was discharged home to f/u at the [facility].  [de-identified] : On 12/16/20 visit, presents for Vidaza this week 12/14-12/19. Overall feeling OK. Ongoing fatigue. Stable SOB with exertion. Reports adequate PO intake and hydration. Drinking 1 glass of wine per week. Intermittent pruritic rash of upper back, intermittently using steroid cream. Trace BLE edema waxes and wanes, currently taking Lastix 40mg bid. Compliant with post transplant meds and restrictions. Denies fever, chills, headache, dizziness, blurred vision, mucositis/odynophagia, chest pain, cough, nausea/vomiting, diarrhea/constipation, abdominal pain, dysuria, bleeding, pain\par \par On 12/29/20, patient presents for a follow up visit. Overall Young is well and offers no acute concerns. Mild erythema of bilateral antecubital fossa. Denies fever, chills, nausea, vomiting, diarrhea, rash, mouth sores, dysuria or any signs of active bleeding. Denies SOB or chest pain. Mild edema of bilateral lower extremities. Remains compliant with medications as prescribed. \par \par On 1/13/21, day + 462 post stem cell transplant. Overall patient is well and offers no acute concerns. Patient is receiving cycle 7 of vidaza this week. Continues to have mild erythema of bilateral antecubital fossa but, admits does not apply hydrocortisone cream BID. Denies fever, chills, nausea, vomiting, diarrhea, rash, mouth sores, dysuria or any signs of active bleeding. Denies SOB or chest pain. Mild edema of bilateral lower extremities. Remains compliant with medications as prescribed. \par \par 1/27/21 Pt doing well...no new issues....tolerating vidazza...\par \par On 3/8/21, patient presents for a follow up visit. Young complains of an erythematous rash of bilateral lower extremities and chest. Patient is receiving cycle 9 of vidaza this week. Denies fever, chills, nausea, vomiting, diarrhea, mouth sores, dysuria or any signs of active bleeding. Denies SOB, chest pain or B/L LE edema. Remains compliant with post transplant medications. \par \par On 4/5/21, patient presents for a follow up visit. Young has an erythematous rash of bilateral upper and lower extremities, chest and back. Went to dermatology recently and had  punch biopsies of bilateral lower extremities. Has been applying lidex cream and triamcinolone cream. States does not have an appetite. Denies fever, chills, nausea, vomiting, diarrhea, mouth sores, dysuria or any signs of active bleeding. Denies SOB, chest pain or B/L LE edema. \par \par Today here for f/u..looks well overall...Ashlyn is on the phone..rash is waxing and waning...compliant with creams...no n/v/d..skin bx c/w GVHD..overall improved\par \par On 9/21/21 Mr. Delong presents for a follow up visit.Spoke to Ashlyn over the phone.He feels light headed at times and has frequent falls.As per the medical assistant, he slid down from the chair during last visit and landed on the floor in sitting position.He is on Midodrine 10mg three time a day and has a follow up appointment with Cardiology...Denies fever, chills, nausea, vomiting,diarrhea,mouth sores,dysuria,or any signs of active bleeding.Denies SOB or chest pain.He had bilateral lower extremity edema +2.He is on Torsemide 40mg daily with improvement..unclear if some component of azacytadine effect....donor cells on whole blood up to 33%....pt offers he is drinking 1 glass of wine daily

## 2021-09-22 NOTE — ASSESSMENT
[FreeTextEntry1] : Young Delong is a 62 y/o male with high risk AML s/p haplo SCT on 10/9/19 with the following comorbidities being managed:\par \par 1) AML\par S/p haplo (son) PSCT on 10/9/19\par 6/5/20 chimerism = 74.3% donor\par 6/23/20 chimerism = 67.7% donor (CD33 = 30% and CD3 = 100% donor)\par 6/25/20 BMbx: VINCE, normal male karyotype \par 7/8/20 chimerism = 57.7% donor (CD3 = 99% and CD33 = 10% donor)\par 7/21/20 chimerism = 59.3% donor (CD3 = 98.3% and CD33 = 7.3% donor)\par 8/13/20 chimerism = 56.7 % donor\par 9/2020 chimerism = 53%\par 9/24/20 chimerism = 55% donor\par 10/8/20 chimerism = 53%\par 10/22/20 chimerism = 38.3 % donor (CD 3 = 99% and CD 33= 1% donor)\par 11/4/20 chimerism = 51.3 % donor (CD 3 = 99% and CD 33= 3% donor)\par 12/3/20 chimerism = 41% donor (CD3 = 99% and CD33 = 1% donor)\par 12/14/20 chimerism= 36.7 % donor ( CD 3= 99% and CD 33=0.3% donor)\par 12/29/20 chimerism= 36.3 % donor ( CD 3= 99% and CD 33= 0.3% donor)\par 1/15/21 36%\par 2/8/21 chimerism= 30% donor ( CD 3 = 98% and CD 33= 0% donor)\par 3/8/21 chimerism= 28.3 % donor ( CD 3= 99% and CD 33=1% donor)\par 4/2021 39% donor...last one on whole bloodbelow 30....cd 3 100...cd33 0\par Current disease status: VINCE based on 6/25/20 BMbx and ongoing peripheral blood work.\par \par In setting of dropping chimerism, SQ Vidaza 32mg/m2 daily x 5 days every 28 days started 7/27/20 \par    Cycle 2 - 8/24-8/28 \par    Cycle 3 - 9/21-9/25\par    Cycle 4 - 10/19 - 10/23\par    Cycle 5 - 11/16 - 11/20\par    Cycle 6 - 12/14 -12/19\par    Cycle 7- 1/11/21- 1/15/21\par    Cycle 8- 2/8/21- 2/12/21\par     Cycle 9- 3/8/21- 3/12/21\par    Cycle 10 - 4/5/21- 4/9/21\par    C 12 this week\par \par S/p DLI #1 on 8/13/20\par S/P DLI #2 on 10/8/20\par S/p DLI #3 on 12/3/20\par Last chimerism in sept with donor cells rising on whole blood from 25 to 33\par 2) Heme\par ABO compatible transplant: pt and donor are both A pos\par Ongoing anemia and thrombocytopenia, poor graft function with dropping CD 33 chimerism \par Continue multivitamin and folic acid daily...will arrange for 2 units of prbc's with diuresis in between\par \par Hx of recurrent DVT/PE\par Factor V Leiden reportedly runs in the family \par 11/21/19 Doppler US of RUE negative for DVT\par Doppler US of BLEs on 7/2/20 negative for DVT\par Xarelto on hold while pancytopenic, to resume when PLT consistently >100K\par \par 3) ID\par Continue ppx:\par - Acyclovir 400 mg bid \par - Voriconazole 200 mg bid - hx of aspergillosis \par -Mepron d/c'd 12/16/20 \par -Post transplant crowd and dietary restrictions reviewed - in light of covid-19, CDC recommendations reviewed  and strict restrictions reinforced \par \par COVID Vaccine:\par Received Moderna COVID vaccine on 1/16/21 and 2/13/21...booster advised\par \par \par CMV viremia\par CMV PCR peaked at 8,581 on 11/20/19\par Valcyte 450mg BID started 11/21/19. Decreased to QD as of 12/17/19. D/c'd 2/14/20 for ongoing thrombocytopenia \par 12/14/20 CMV PCR not detected \par 12/29/20 CMV PCR not detected\par 2/8/21 CMV PCR not detected\par Continue to monitor PCR at ever visit \par \par 4) GVHD\par Skin 0  Liver 0   GI 0 - overall grade 0.....\par cont topical steroid cream prn\par No signs of chronic GVHD at this time\par Off MMF as of 11/22/19 \par Tacrolimus discontinued on 6/15/20\par Prednisone discontinued on 6/15/20\par Reviewed signs/symptoms of GVHD (i.e. rash, mucositis, nausea/vomiting, diarrhea)\par \par aGVHD of skin \par Maculopapular pruritic rash of face, neck, and upper chest (BSA 19%) noted 1/21/20 in setting of tapering prednisone (for FTT) - max skin stage 1, overall grade 1\par 1/21/20: rash on face, neck, and chest (BSA 19%); skin 1, overall grade 1; prednisone increased to 10mg daily\par 2/14/20: rash on neck, chest, and bilateral upper arms (BSA 18%); skin 1, overall grade 1: prednisone increased to 20mg daily\par 2/21/20: rash on neck and chest (10% BSA); skin 1, overall grade 1. Continue prednisone 20mg daily\par 3/12/20: rash on neck and chest (10% BSA); skin 1, overall grade 1. Continue prednisone 15mg daily  \par 3/19/20: rash on neck and chest (10% BSA); skin 1, overall grade 1. Continue prednisone 15 mg daily\par 4/2/20: rash on neck and chest (10% BSA); skin 1, overall grade 1. Continue prednisone 15 mg daily\par 4/9/20: rash resolved, prednisone decreased to 10mg daily\par 4/17/20: rash on neck and chest (10% BSA); skin 1, overall grade 1. Continue prednisone 10mg daily and reinforced compliance with triamcinolone cream bid \par 5/8/20: waxing and waning pruritic erythema of upper back, unsure if GVHD. Continue prednisone 10mg daily at this time\par 5/14/20: mild erythema of neck and upper back ( 5% BSA); skin 1, overall grade 1. Continue prednisone 10 mg daily\par 5/22/20: faint pruritic erythema of upper chest and upper back (9% BSA); skin 1, overall grade 1. Prednisone decreased 5mg daily for drop in chimerism \par 6/5/20: monitor very faint erythema of chest and upper back. Continue triamcinolone 0.1% cream bid PRN rash \par Continue to monitor closely \par 6/23/20: Mild erythema of upper back and bilateral upper extremities.Continue triamcinolone 0.1% cream bid PRN rash \par 7/2/20: pruritic erythema of upper back, chest, and bilateral upper arms (36% BSA). Defer starting oral immunosuppression at this time given drop in chimerism. Start Claritin daily and Pepcid bid. Will attempt to attain insurance auth for Lidex 0.1% cream bid, continue to monitor closely\par 7/8/20: Pruritic erythema of chest and bilateral upper arms (18% BSA). Patient will pay out of pocket today for lidex cream. To be applied BID. Continue claritin and pepcid. \par 7/21/20: no rash \par 7/30/20: erythematous rash of chest (9% BSA), continue Lidex cream bid \par 8/27/20: Erythematous rash of chest and back ( 9% BSA). Continue lidex cream BID\par 9/8/20: erythematous rash of BUEs (18% BSA), continue Lidex cream bid \par 3/8/21: Mild erythematous rash of bilateral lower extremities and chest. BSA 22.5%. Recommended patient to apply bacitracin and hydrocortisone cream BID. Patient is aware to call office if rash worsens. \par 4/5/21, 5/4/21: Erythematous rash of bilateral upper and lower extremities, chest, back. Continue lidex cream to chest, back and upper extremities. Continue triamcinolone cream to B/L LE extremities as prescribed by dermatologist. 50%\par \par 3/17/21: Right and Left leg punch biopsy of legs\par Right leg- Lichenoid dermatitis with epidermal hyperplasia. Note: the favored diagnosis on histological grounds alone is hypertrophic lichen planus. However, given the clinical information provided, a hypertrophic manifestation of graft vs host disease and a lichenoid drug eruption remain in the differential diagnosis.\par Left Leg- Lichenoid dermatitis with epidermal hyperplasia. Note: the favored diagnosis on histological grounds alone in hypertropic lichen planus. However, given the clinical information provided, a hypertrophic manifestation of graft vs host disease and a lichenoid drug eruption remain in the differential diagnosis.\par \par Recently finished a 10 day course of Keflex prescribed on 3/15/21 for bilateral lower extremities.\par \par 9/7/21: No rash\par  \par 5) GI\par Continue ppx:\par - Protonix 40 mg daily\par - Ursodiol 300 mg bid\par    PRN Zofran and Reglan for nausea/vomiting\par \par 6) Cardiac\par HTN \par Soft BP with lightheadedness\par Metoprolol succinate 25 mg daily - discontinued by cardiology\par Cardiomyopathy...??related to ETOH...\par ECHO completed on  6/24/20, ordered by cardiology\par ECHO completed on 11/5/20 - Left ventricular ejection fraction of 20%\par Continue meds as prescribed:\par - Carvedilol 6.25 mg bid\par - Valsartan 40 mg daily HOLDING as per cardiology\par - Torsemide 40 mg bid\par -Midodrine 10mg three times daily\par Continue f/u with cardiology ....attempt made to call MD..no answer..\par \par Shortness of breath\par STAT chest xray completed on 11/4/20 - heart enlarged with small bilateral pleural effusions\par Increased lasix to 80 mg BID on 11/4/20 for two days. Then decrease lasix to 40 mg BID.\par Echo scheduled for 11/5/20. Patient and patients girlfriend aware patient needs to see Dr Lyn. \par Patient is aware he cannot have any  ETOH . met with patient and explained patient cannot drink ETOH. Contributing to fluid overload and heart failure. \par RESOLVED\par \par 7) Other\par BPH - continue finasteride 5mg daily and tamsulosin 0.4 mg daily \par Anxiety/Depression - continue Zoloft 50 mg daily\par Insomnia - continue PRN Xanax 0.5-1 mg qhs\par Pruritus - continue Pepcid 20 mg bid and Zyrtec 10 mg daily \par Frequent Falls- Advised to make sure that he is not light headed before he gets up to stand or walk;Continue Midodrine as prescibed\par \par 8) Plan/Dispo\par Pt educated regarding plan of care, all questions/concerns addressed\par Vidaza on hold secondary to hypotension and frequent falls along with skin changes\par call if rash appears ...hope to see chimerism rise further....consider subsequent dli??\par Follow up with Cariology \par Follow up with Dr. Gaytan in 6 weeks\par f/u Transplant NP in 3 weeks

## 2021-09-22 NOTE — PHYSICAL EXAM
[Ambulatory and capable of all self care but unable to carry out any work activities] : Status 2- Ambulatory and capable of all self care but unable to carry out any work activities. Up and about more than 50% of waking hours [Normal] : affect appropriate [No active (erythematous_ GVHD rash)] : Skin: No active (erythematous_ GVHD rash) [< 2 mg/dl] : Liver: < 2 mg/dl [No or intermittent] : Upper GI: No or intermittent nausea, vomiting or anorexia [<500 ml/day or < 3 episodes/day] : Lower GI (stool output/day): <500 ml/day or <3 episodes/day [I] : I [No] : No [Total % ____] : Total: [unfilled]% [de-identified] : minimal crackles on left lower lobe,dry cough occasionally [de-identified] : Light headed [de-identified] : No edema or pain to palpation on sacral area  [de-identified] : Erythematous rash of bilateral upper and lower extremities, chest, back resolved...now hyperpigmented.Generalized Ecchymoses. [FreeTextEntry1] : 10/9/19 [OnsetofaGVHD] : 3/8/21

## 2021-09-22 NOTE — REVIEW OF SYSTEMS
[Negative] : Allergic/Immunologic [Chest Pain] : no chest pain [Palpitations] : no palpitations [Leg Claudication] : no intermittent leg claudication [Lower Ext Edema] : no lower extremity edema [Shortness Of Breath] : no shortness of breath [Wheezing] : no wheezing [Cough] : no cough [SOB on Exertion] : no shortness of breath during exertion [FreeTextEntry6] : Crackles on left lower lobe,dry cough occasionally [FreeTextEntry7] : No appetite [de-identified] : Erythematous rash of bilateral upper and lower extremities, chest, back resolved...now hyperpigmented.Generalized Ecchymoses

## 2021-10-05 PROBLEM — Z01.818 PRE-OP TESTING: Status: ACTIVE | Noted: 2021-01-01

## 2021-10-06 PROBLEM — Z94.84 HISTORY OF ALLOGENEIC STEM CELL TRANSPLANT: Status: ACTIVE | Noted: 2019-10-31

## 2021-10-06 PROBLEM — C92.00 ACUTE MYELOID LEUKEMIA: Status: ACTIVE | Noted: 2019-07-01

## 2021-10-06 NOTE — CHART NOTE - NSCHARTNOTEFT_GEN_A_CORE
Cardiac cath was cancelled today bc patient with Hgb 6.6 Dr. Khan aware and will transfuse 2 u PRBC, consent for blood is in the chart . Will be admitted to medicine hospitalist service. Cardiac cath once stable .

## 2021-10-06 NOTE — PROVIDER CONTACT NOTE (OTHER) - ASSESSMENT
pt A04. pt scratching BL UEs and chest at this time during pRBC transfusion. pt appears free from cardio/pulmonary distress. pt denies SOB/chest pain. pt states this has happened to him before and was treated with Benadryl. (pt was premedicated with Benadryl and Tylenol earlier, but transfusion delayed from blood bank.)

## 2021-10-06 NOTE — PROVIDER CONTACT NOTE (OTHER) - SITUATION
Alma Rosa Velarde is a 51 year old female presenting for EMG RLE, LLE  Referred by Radha Moss MD    Infrared Skin Temperature (C):  RLE 35.6 °  LLE 37.2 °   Patient is complaining of shortness of breath while pRBC transfusion is in progress.

## 2021-10-06 NOTE — H&P ADULT - NSHPPHYSICALEXAM_GEN_ALL_CORE
T(C): 36.5 (10-06-21 @ 12:04), Max: 36.5 (10-06-21 @ 12:04)  HR: 97 (10-06-21 @ 12:04) (97 - 97)  BP: 104/64 (10-06-21 @ 12:04) (104/64 - 104/64)  RR: 18 (10-06-21 @ 12:04) (18 - 18)  SpO2: 95% (10-06-21 @ 12:04) (95% - 95%)    GEN: (fe)male in NAD, appears comfortable, no diaphoresis  EYES: No scleral injection, PERRL, EOMI  ENTM: neck supple & symmetric without tracheal deviation, moist membranes, no gross hearing impairment, thyroid gland not enlarged  CV: +S1/S2, no m/r/g, no abdominal bruit, no LE edema  RESP: breathing comfortably, no respiratory accessory muscle use, CTAB, no w/r/r  GI: normoactive BS, soft, NTND, no rebounding/guarding, no palpable masses  LYMPHATICS: no LAD or tenderness to palpation  NEURO: AOx3, no focal deficits, CNII-XII grossly intact  PSYCH: No SI/HI/AVH, appropriate affect, appropriate insight/judgment   SKIN: no petechiae, ecchymosis or maculopapular rash noted T(C): 36.5 (10-06-21 @ 12:04), Max: 36.5 (10-06-21 @ 12:04)  HR: 97 (10-06-21 @ 12:04) (97 - 97)  BP: 104/64 (10-06-21 @ 12:04) (104/64 - 104/64)  RR: 18 (10-06-21 @ 12:04) (18 - 18)  SpO2: 95% (10-06-21 @ 12:04) (95% - 95%)    GEN: male in NAD, appears comfortable, no diaphoresis  EYES: No scleral injection, PERRL, EOMI  ENTM: neck supple & symmetric without tracheal deviation, moist membranes, no gross hearing impairment, thyroid gland not enlarged  CV: +S1/S2, no m/r/g, no abdominal bruit, no LE edema  RESP: breathing comfortably, no respiratory accessory muscle use, CTAB, no w/r/r  GI: normoactive BS, soft, NTND, no rebounding/guarding, no palpable masses  LYMPHATICS: no LAD or tenderness to palpation  NEURO: AOx3, no focal deficits, CNII-XII grossly intact  PSYCH: No SI/HI/AVH, appropriate affect, appropriate insight/judgment   SKIN: no petechiae, ecchymosis or maculopapular rash noted

## 2021-10-06 NOTE — CHART NOTE - NSCHARTNOTEFT_GEN_A_CORE
Medicine NP note    CC: SOB  Notified by RN that patient c/o SOB while PRBC transfusion in progress. Evaluated the patient at bedside. Patient noted to be mildly tachypneic,   c/o mild SOB, denies HA, dizziness, CP, back pain, abdominal pain. not hypoxic, on 2lNC    Vital Signs Last 24 Hrs  T(C): 36.6 (06 Oct 2021 20:30), Max: 36.6 (06 Oct 2021 18:00)  T(F): 97.8 (06 Oct 2021 20:30), Max: 97.9 (06 Oct 2021 18:00)  HR: 94 (06 Oct 2021 20:30) (94 - 98)  BP: 103/73 (06 Oct 2021 20:30) (96/64 - 104/64)  BP(mean): --  RR: 22 (06 Oct 2021 20:30) (18 - 22)  SpO2: 98% (06 Oct 2021 20:30) (95% - 98%)                          6.2    4.32  )-----------( 63       ( 06 Oct 2021 12:45 )             18.9     A/P    61M with PMHx of chronic systolic heart failure (bivenctricular failure), factor V leiden (not on anticoagulation), prior aspergillosis, COLLEEN (refuses CPAP) and AML (post-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 Patient originally presented for right heart cath to guide management of biventricular failure.    Patient anemic, currently PRBC 1/2 unit transfusion in progress.  Now with C/O SOB    SOB/CHF  SOB likely from volume overload, lung exam consistent with B.L crackles, no wheezing/stridor  Patient on lasix q4 hourly after each transfusion  Lasix 20mg ivp x1 dose now  Monitor I/O  C/W Diuresis  Cardiology consult am  Will follow  Will sign out to day team    Garry patel P BC  [id number]

## 2021-10-06 NOTE — CHART NOTE - NSCHARTNOTEFT_GEN_A_CORE
Spoke to blood bank. There is concern for TACO given prior history and severe biventricular failure. Heme/onc would like goal hemoglobin of 8 given cardiac disease.    Will give 1/2 unit at a time over 3 hours with lasix IV after each of the first 3. Then patient will be on standing Lasix 40 mg IV BID.

## 2021-10-06 NOTE — H&P ADULT - HISTORY OF PRESENT ILLNESS
61M with PMHx of chronic systolic heart failure (bivenctricular failure), factor V leiden (not on anticoagulation), prior aspergillosis, COLLEEN (refuses CPAP) and AML (post-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 Patient presenting here for RHC to guide management given severe heart failure. Patient found to be anemia with hemoglobin of 6s (patient's baseline seems to be 9-10). Unable to get RHC due to anemia; thus, transferred to medicine. He was recently admitted to [facility] for falls and started on midodrine to augment BP. Patient seen on 3DSU prior to initiation of transfusions. He is very nonchalant. He has no complaints, shrugs to most of my questions.

## 2021-10-06 NOTE — H&P ADULT - PROBLEM SELECTOR PLAN 2
-Plan discussed with HF attending  -Lasix 40 mg IV BID for now  -Hold home midodrine  -Continue with ToprolXL  -No ACEI/ARB given chronic hypotension  -Still plan for RHC this admission  -Daily weighs & I&Os

## 2021-10-06 NOTE — CONSULT NOTE ADULT - SUBJECTIVE AND OBJECTIVE BOX
Oncology Consult Note    HPI:  62 yo male with known hx of cardiomyopathy, HTN, DVT/PE , factor 5 leiden mutation, AML s/p allo transplant presented to hospital for scheduled elective cardiac cath. On admission, patient is asymptomatic but found to have hbg of 6.1. Cardiology recommended to not proceed with cardiac cath adn have patient admitted for pRBC transfusion. Hematology/BMT unit consulted for management of hbg.     Allergies    No Known Allergies    Intolerances        MEDICATIONS  (STANDING):  acetaminophen   Tablet .. 650 milliGRAM(s) Oral once  furosemide   Injectable 40 milliGRAM(s) IV Push once  sodium chloride 0.9% lock flush 3 milliLiter(s) IV Push every 8 hours    MEDICATIONS  (PRN):  acetaminophen   Tablet .. 650 milliGRAM(s) Oral every 6 hours PRN Temp greater or equal to 38C (100.4F), Mild Pain (1 - 3)  diphenhydrAMINE 50 milliGRAM(s) Oral once PRN Rash and/or Itching      PAST MEDICAL & SURGICAL HISTORY:  Factor 5 Leiden mutation, heterozygous    H/O cardiomyopathy    Deep vein thrombosis (DVT)    Pulmonary embolism    COLLEEN (obstructive sleep apnea)  as per pt. he tried cpap/ bipap in the past did not like it so never used after that.    Thrombocytopenia    History of hypotension    No significant past surgical history  bone marrow transplant        FAMILY HISTORY:  FH: heart attack  father, mother, and brother        SOCIAL HISTORY: No EtOH, no tobacco    REVIEW OF SYSTEMS:    CONSTITUTIONAL: No weakness, fevers or chills  EYES/ENT: No visual changes;  No vertigo or throat pain   NECK: No pain or stiffness  RESPIRATORY: No cough, wheezing, hemoptysis; No shortness of breath  CARDIOVASCULAR: No chest pain or palpitations  GASTROINTESTINAL: No abdominal or epigastric pain. No nausea, vomiting, or hematemesis; No diarrhea or constipation. No melena or hematochezia.  GENITOURINARY: No dysuria, frequency or hematuria  NEUROLOGICAL: No numbness or weakness  SKIN: No itching, burning, rashes, or lesions   All other review of systems is negative unless indicated above.        T(F): 97.7 (10-06-21 @ 12:04), Max: 97.7 (10-06-21 @ 12:04)  HR: 97 (10-06-21 @ 12:04)  BP: 104/64 (10-06-21 @ 12:04)  RR: 18 (10-06-21 @ 12:04)  SpO2: 95% (10-06-21 @ 12:04)  Wt(kg): --    GENERAL: NAD, well-developed  HEAD:  Atraumatic, Normocephalic  EYES: EOMI, PERRLA, conjunctiva and sclera clear  NECK: Supple, No JVD  CHEST/LUNG: Clear to auscultation bilaterally; No wheeze  HEART: Regular rate and rhythm; No murmurs, rubs, or gallops  ABDOMEN: Soft, Nontender, Nondistended; Bowel sounds present  EXTREMITIES:  2+ Peripheral Pulses, No clubbing, cyanosis, or edema  NEUROLOGY: non-focal  SKIN: No rashes or lesions                          6.2    4.32  )-----------( 63       ( 06 Oct 2021 12:45 )             18.9       10-06    138  |  101  |  21  ----------------------------<  160<H>  4.0   |  20<L>  |  0.96    Ca    8.6      06 Oct 2021 12:00             Oncology Consult Note    HPI:  60 yo male with known hx of cardiomyopathy, HTN, DVT/PE , factor 5 leiden mutation, AML s/p allo transplant presented to hospital for scheduled elective cardiac cath. On admission, patient is asymptomatic but found to have hbg of 6.1. Cardiology recommended to not proceed with cardiac cath adn have patient admitted for pRBC transfusion. Hematology/BMT unit consulted for management of hbg.     At time of encounter, patient reports to be doing well. He denies any worsening from baseline of SOB, chest pain or faitgue. He does report increased falls for which he has seen a cardiologist as outpatient and was started on midodrine for BP management. He reports his lower extremity edema is at its baseline.     Allergies    No Known Allergies    Intolerances        MEDICATIONS  (STANDING):  acetaminophen   Tablet .. 650 milliGRAM(s) Oral once  furosemide   Injectable 40 milliGRAM(s) IV Push once  sodium chloride 0.9% lock flush 3 milliLiter(s) IV Push every 8 hours    MEDICATIONS  (PRN):  acetaminophen   Tablet .. 650 milliGRAM(s) Oral every 6 hours PRN Temp greater or equal to 38C (100.4F), Mild Pain (1 - 3)  diphenhydrAMINE 50 milliGRAM(s) Oral once PRN Rash and/or Itching      PAST MEDICAL & SURGICAL HISTORY:  Factor 5 Leiden mutation, heterozygous    H/O cardiomyopathy    Deep vein thrombosis (DVT)    Pulmonary embolism    COLLEEN (obstructive sleep apnea)  as per pt. he tried cpap/ bipap in the past did not like it so never used after that.    Thrombocytopenia    History of hypotension    No significant past surgical history  bone marrow transplant        FAMILY HISTORY:  FH: heart attack  father, mother, and brother        SOCIAL HISTORY: No EtOH, no tobacco    REVIEW OF SYSTEMS:    CONSTITUTIONAL: No weakness, fevers or chills  EYES/ENT: No visual changes;  No vertigo or throat pain   NECK: No pain or stiffness  RESPIRATORY: No cough, wheezing, hemoptysis; No shortness of breath  CARDIOVASCULAR: No chest pain or palpitations  GASTROINTESTINAL: No abdominal or epigastric pain. No nausea, vomiting, or hematemesis; No diarrhea or constipation. No melena or hematochezia.  GENITOURINARY: No dysuria, frequency or hematuria  NEUROLOGICAL: No numbness or weakness  SKIN: No itching, burning, rashes, or lesions   All other review of systems is negative unless indicated above.        T(F): 97.7 (10-06-21 @ 12:04), Max: 97.7 (10-06-21 @ 12:04)  HR: 97 (10-06-21 @ 12:04)  BP: 104/64 (10-06-21 @ 12:04)  RR: 18 (10-06-21 @ 12:04)  SpO2: 95% (10-06-21 @ 12:04)  Wt(kg): --    GENERAL: NAD, well-developed, pale   HEAD:  Atraumatic, Normocephalic  EYES: EOMI, PERRLA, conjunctiva and sclera clear  NECK: Supple, No JVD  CHEST/LUNG: Clear to auscultation bilaterally; No wheeze  HEART: Regular rate and rhythm; No murmurs, rubs, or gallops  ABDOMEN: Soft, Nontender, Nondistended; Bowel sounds present  EXTREMITIES:  2+ Peripheral Pulses, No clubbing, cyanosis; +3 edema of lower extremities   NEUROLOGY: non-focal  SKIN: No rashes; multiple skin lesions with sloughing of skin on bilateral upper extremities and neck                          6.2    4.32  )-----------( 63       ( 06 Oct 2021 12:45 )             18.9       10-06    138  |  101  |  21  ----------------------------<  160<H>  4.0   |  20<L>  |  0.96    Ca    8.6      06 Oct 2021 12:00

## 2021-10-06 NOTE — PROVIDER CONTACT NOTE (OTHER) - ASSESSMENT
Pt. A&Ox4, slightly tachypneic with a RR of 22. BP: 103/73, HR: 94. SpO2%: 98% on 2L nasal cannula. No use of accessory muscles.

## 2021-10-06 NOTE — H&P ADULT - NSHPREVIEWOFSYSTEMS_GEN_ALL_CORE
GEN: no night sweats or change in appetite  EYES: no changes in vision or diplopia   ENT: no epistaxis, sinus pain, gingival bleeding, odynophagia or dysphagia  CV: no CP, PND or palpitations  RESP: no cough, wheezing, or hemoptysis  GI: no hematemesis, hematochezia, or melena  : no dysuria, polyuria, or hematuria  MSK: no arthralgias or joint swelling   NEURO: no gross sensory changes, numbness, focal deficits  PSYCH: no depression or changes in concentration  HEME/ONC: no purpura, petechiae or night sweats  SKIN: no pruritus, hair loss or skin lesions  ALL: no photosensitivity, no complaints of anaphylaxis (SOB, throat swelling)

## 2021-10-06 NOTE — H&P ADULT - PROBLEM SELECTOR PLAN 1
Suspect a hypoproliferative process with chronic thrombocytopenia as well. It could always be an acute blood loss anemia, but no endorsed blood loss symptoms/signs. Heme/Onc consult appreciated. Plan to transfuse 2 units pRBC and attempt to get hemoglobin to 8. Lasix IV between units. Reassess volume status after 2nd unit for possibly more Lasix

## 2021-10-06 NOTE — H&P ADULT - NSICDXPASTMEDICALHX_GEN_ALL_CORE_FT
PAST MEDICAL HISTORY:  Deep vein thrombosis (DVT)     Factor 5 Leiden mutation, heterozygous     H/O cardiomyopathy     History of hypotension     COLLEEN (obstructive sleep apnea) as per pt. he tried cpap/ bipap in the past did not like it so never used after that.    Pulmonary embolism     Thrombocytopenia

## 2021-10-06 NOTE — CONSULT NOTE ADULT - ASSESSMENT
59 yo male with known medical history of cardiomyopathy (non ischemic), DVT/Pe with Factor V leiden mutation, aspergillosis HTN, amputation of right toe and AML s/p unrelated, haplo allogenic transplant presented for elective cardiac cath found to have anemia     # AML s/p Unrelated mismatched allogenic transplant 10/19   - s/p 3 DLIs and 12 cycles vidaza   - continues to have ongoing anemia and thrombocytopenia with poor graft function; transfusion dependent   - pRBC transfussion for hbg <8 given hx of cardiomyopathy; hgb currently 6.1; recommend 2 units at this time with diuresis in between if appears fluid overloaded.   - recommend multivitamin and folic acid   - recommend to continue home prophylaxis medications: acyclovir and voriconazole   - recommend to continue home ursodial and protonix     #hx of factor V Leidein with Hx of PE/DVT   - hold Xarelto for now until platelets consistently above 100      57 yo male with known medical history of cardiomyopathy (non ischemic), DVT/Pe with Factor V leiden mutation, aspergillosis HTN, amputation of right toe and AML s/p unrelated, haplo allogenic transplant presented for elective cardiac cath found to have anemia     # AML s/p Unrelated mismatched allogenic transplant 10/19   - s/p 3 DLIs and 12 cycles vidaza (last in may 2021); last chimerism (9/7/2021) with decreasing donor cells  - continues to have ongoing anemia and thrombocytopenia with poor graft function; transfusion dependent. Last tx 2 weeks prior.   - pRBC transfussion for hbg <8 given hx of cardiomyopathy; hgb currently 6.1; recommend 2 units at this time with diuresis in between if appears fluid overloaded.   - recommend multivitamin and folic acid   - recommend to continue home prophylaxis medications: acyclovir and voriconazole   - recommend to continue home ursodiol and protonix     #hx of factor V Leidein with Hx of PE/DVT   - hold Xarelto for now until platelets consistently above 100     # Extensive cardiac history including chronic biventricular heart failure with reduced EF;   - admitted for RHC   - management per cardiology and primary team.     Sindy Pinedo   PGY4 fellow Heme/Onc - BMT   [phone]

## 2021-10-06 NOTE — H&P ADULT - PROBLEM SELECTOR PLAN 4
Not on AC. Thrombocytopenia fluctuates. If PLT >50 tomorrow and anemia not worsening would start pharmacological DVT PPx as benefits>risks

## 2021-10-06 NOTE — H&P ADULT - ASSESSMENT
61M with PMHx of chronic systolic heart failure (bivenctricular failure), factor V leiden (not on anticoagulation), prior aspergillosis, COLLEEN (refuses CPAP) and AML (post-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 Patient originally presented for right heart cath to guide management of biventricular failure.

## 2021-10-06 NOTE — H&P ADULT - PROBLEM SELECTOR PLAN 3
Patient with worsening chimerism of bone marrow transplant    -Continue with Acyclovir PPx  -Heme/onc consult appreciated

## 2021-10-06 NOTE — CHART NOTE - NSCHARTNOTEFT_GEN_A_CORE
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  Admission labs remarkable for anemia in spite of h/o 2u PRBC transfusions a few weeks ago.  Patient will be admitted to medicine team, will transfuse 2u PRBC transfusion, recheck labs 1h post transfusion and plan for RHC tomorrow. Will leave in PA catheter based on hemodynamics, suspect low output state.   PLease start lasix 40mg IV BID, first dose to be given in between PRBC transfusion.   Full consult note to follow.   Plan has been d/w Diane Devine and Lukas. I've also notified Dr. Zamudio (referring cardiologist).

## 2021-10-07 NOTE — PROVIDER CONTACT NOTE (OTHER) - BACKGROUND
DX: Status Post Heart Transplantation  PMH: COLLEEN, Hypotension, Thrombocytopenia, PE/DVT, Cardiomyopathy, Factor 5 Leiden.
Pt. presenting with BIV failure requiring cardiac catheterization. PMH: AML s/p bone marrow transplant, COLLEEN, Hypotension, Thrombocytopenia, PE/DVT, Cardiomyopathy, Factor 5 Leiden.
Pt. presenting with BIV failure requiring cardiac catheterization. PMH: AML s/p bone marrow transplant, COLLEEN, Hypotension, Thrombocytopenia, PE/DVT, Cardiomyopathy, Factor 5 Leiden.

## 2021-10-07 NOTE — CONSULT NOTE ADULT - PROBLEM SELECTOR RECOMMENDATION 9
Presumed NICMP LVEF <20% LVIDd 7cm  - GDMT: Continue Toprol XL 25 mg daily. Hypotension has limited initiation of other GDMT as an outpatient and was on midodrine at home  - Diuretic: Continue Lasix 40 mg IV BID.  - Device: Not an ideal candidate for ICD due to thrombocytopenia and will also be based on prognosis.  - Advanced therapies: He has clear clinical evidence of advanced disease. His multiple comorbidities preclude heart tx. His thrombocytopenia, inability to take anticoagulation, RV dysfunction, frailty and h/o infections make him a suboptimal candidate for LVAD. Can consider palliative inotropes, however would like to discuss with his hematologist and primary cardiologist Dr. Zamudio. Picc line placement may also increase the risk of infections.   - Plan is for RHC today  - CardioMEMs would be helpful however patient cannot take AC/antiplatelets and has h/o factor V mutation/DVT/PE Presumed NICMP LVEF <20% LVIDd 7cm  - GDMT: HOLD Toprol XL 25 mg daily. Hypotension has limited initiation of other GDMT as an outpatient and was on midodrine at home  - Diuretics: Continue Lasix 40 mg IV BID, uptitrate as needed.  - Device: Not an ideal candidate for ICD due to thrombocytopenia and will also be based on prognosis.  - Advanced therapies: He has clear clinical evidence of advanced disease. His multiple comorbidities preclude heart tx. His thrombocytopenia, inability to take anticoagulation, RV dysfunction, frailty and h/o infections make him a suboptimal candidate for LVAD. Can consider palliative inotropes however both patient and wife would like to avoid.    - Plan is for RHC today

## 2021-10-07 NOTE — CONSULT NOTE ADULT - ASSESSMENT
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 Admission labs remarkable for anemia in spite of h/o 2u PRBC transfusions a few weeks PTA, so was admitted to medicine team for anemia. He is currently receiving his 4th 1/2 unit of PRBC and plan is for RHC today. Will leave in PA catheter based on hemodynamics, suspect low output state.  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 Admission labs remarkable for anemia in spite of h/o 2u PRBC transfusions a few weeks PTA, so was admitted to medicine team for anemia. He is currently receiving his 4th 1/2 unit of PRBC and plan is for RHC today. Will leave in PA catheter based on hemodynamics, suspect low output state.     Pertinent Cardiac Studies   8/17/2021 TTE: LVEF <20%, LVIDd 7cm, trace MR, mild TR, RVSP 28 mmHg (RAP 3 mmHg)  11/2020 TTE: LVEF 21% LVIDd ?6.03cm, GLS Avg -7.9%, mild MR, RV mildly enlarged w severely decreased systolic function (S ' 0.9 m/s, TAPSE 0.7cm ), moderate TR, RVSP 37mmHg (RAP ~10 mmHg)  6/2020 LVEF 31% LVIDd 7.3cm, mild MR, grade DD, nl RV size/function, RVSP 51mmHg (RAP ~8 mmHg)   7/2019 LVEF 42% LVIDd 7.0 cm, mild MR, Nl RV size/function.      2019 cMRI: LVEF 24%, LV diameter 5.3cm, globally hypokinetic, CO/CI 4.79/2.31. Mid wall myocardial LGE at the base involving anteroseptal and inferoseptal wall is associated with a nonischemic cardiomyopathy. Nl LV thrombus.     LHC done in 2010 which showed no CAD (as per prior notes-?[facility])

## 2021-10-07 NOTE — CHART NOTE - NSCHARTNOTEFT_GEN_A_CORE
CCU Accept Note    DI CONTRERAS  61y  Patient is a 61y old  Male who presents with a chief complaint of Right Heart Cath (07 Oct 2021 13:04)      ====================  HPI & Hospital Course:       Past Medical History:     Past Surgical History:     Home Medications:  acyclovir 400 mg oral tablet: 1 tab(s) orally 2 times a day (06 Oct 2021 14:07)  finasteride 5 mg oral tablet: 1 tab(s) orally once a day (06 Oct 2021 14:07)  folic acid 1 mg oral tablet: 1 tab(s) orally once a day (06 Oct 2021 14:07)  Metoprolol Succinate ER 25 mg oral tablet, extended release: 1 tab(s) orally once a day (06 Oct 2021 14:07)  midodrine 10 mg oral tablet: 1 tab(s) orally 3 times a day, As Needed  if sbp &lt; 95 (06 Oct 2021 14:07)  Multiple Vitamins oral tablet: 1 tab(s) orally once a day (06 Oct 2021 14:07)  Pepcid 20 mg oral tablet: 1 tab(s) orally 2 times a day (06 Oct 2021 14:07)  sertraline 50 mg oral tablet: 1 tab(s) orally once a day (06 Oct 2021 14:07)  tamsulosin 0.4 mg oral capsule: 1 cap(s) orally once a day (at bedtime) (06 Oct 2021 14:07)  torsemide 20 mg oral tablet: 2 tab(s) orally once a day (06 Oct 2021 14:07)  voriconazole 200 mg oral tablet: 1 tab(s) orally every 12 hours (06 Oct 2021 14:07)  ZyrTEC 10 mg oral tablet: 1 tab(s) orally once a day (06 Oct 2021 14:07)      Current Medications:   MEDICATIONS  (STANDING):  acyclovir   Oral Tab/Cap 400 milliGRAM(s) Oral two times a day  finasteride 5 milliGRAM(s) Oral daily  folic acid 1 milliGRAM(s) Oral daily  furosemide   Injectable 40 milliGRAM(s) IV Push two times a day  influenza   Vaccine 0.5 milliLiter(s) IntraMuscular once  multivitamin 1 Tablet(s) Oral daily  pantoprazole    Tablet 40 milliGRAM(s) Oral before breakfast  sertraline 50 milliGRAM(s) Oral daily  sodium chloride 0.9% lock flush 3 milliLiter(s) IV Push every 8 hours  tamsulosin 0.4 milliGRAM(s) Oral at bedtime  voriconazole 200 milliGRAM(s) Oral every 12 hours    MEDICATIONS  (PRN):  acetaminophen   Tablet .. 650 milliGRAM(s) Oral every 6 hours PRN Temp greater or equal to 38C (100.4F), Mild Pain (1 - 3)  ALPRAZolam 0.5 milliGRAM(s) Oral at bedtime PRN Anxiety  diphenhydrAMINE 25 milliGRAM(s) Oral every 4 hours PRN Rash and/or Itching      Allergies:     Family History:     Social History:     ====================  Vital Signs Last 24 Hrs  T(C): 36.5 (07 Oct 2021 12:50), Max: 36.9 (07 Oct 2021 09:15)  T(F): 97.7 (07 Oct 2021 12:50), Max: 98.4 (07 Oct 2021 09:15)  HR: 85 (07 Oct 2021 15:40) (81 - 99)  BP: 91/58 (07 Oct 2021 15:40) (91/58 - 109/78)  BP(mean): 70 (07 Oct 2021 15:40) (70 - 70)  RR: 16 (07 Oct 2021 15:40) (15 - 22)  SpO2: 97% (07 Oct 2021 15:40) (95% - 100%)    Adult Advanced Hemodynamics Last 24 Hrs  CVP(mm Hg): 16 (07 Oct 2021 15:40) (16 - 16)  CVP(cm H2O): --  CO: --  CI: --  PA: 40/22 (07 Oct 2021 15:40) (40/22 - 40/22)  PA(mean): 30 (07 Oct 2021 15:40) (30 - 30)  PCWP: --  SVR: --  SVRI: --  PVR: --  PVRI: --    Physical Exam:   General: NAD  HEENT: PERRL, EOMI, normal sclera and conjunctiva, no oral lesions  Neck: Supple, no JVD  Lungs: CTA bilaterally  Heart: RRR, normal S1S2, no murmurs/rubs/gallops  Abdomen: Soft, ND/NT  Extremities: 2+ peripheral pulses, no cyanosis/clubbing/edema, full ROM  Skin: Warm, well-perfused  Neuro: A&O x3, no focal deficits      ====================  Labs & Imaging:   CBC Full  -  ( 07 Oct 2021 06:27 )  WBC Count : 4.74 K/uL  RBC Count : 2.65 M/uL  Hemoglobin : 8.4 g/dL  Hematocrit : 25.0 %  Platelet Count - Automated : 64 K/uL  Mean Cell Volume : 94.3 fl  Mean Cell Hemoglobin : 31.7 pg  Mean Cell Hemoglobin Concentration : 33.6 gm/dL  Auto Neutrophil # : 2.70 K/uL  Auto Lymphocyte # : 1.00 K/uL  Auto Monocyte # : 0.63 K/uL  Auto Eosinophil # : 0.42 K/uL  Auto Basophil # : 0.00 K/uL  Auto Neutrophil % : 57.0 %  Auto Lymphocyte % : 21.0 %  Auto Monocyte % : 13.2 %  Auto Eosinophil % : 8.8 %  Auto Basophil % : 0.0 %    10-07    139  |  101  |  24<H>  ----------------------------<  124<H>  4.0   |  22  |  1.09    Ca    8.9      07 Oct 2021 06:27  Phos  3.8     10-07  Mg     1.9     10-07    TPro  5.4<L>  /  Alb  3.7  /  TBili  1.4<H>  /  DBili  x   /  AST  51<H>  /  ALT  60<H>  /  AlkPhos  259<H>  10-07              ====================  Assessment & Plan:     ====================      Celestine Kenney MD PGY1 CCU Accept Note    DI CONTRERAS  61y  Patient is a 61y old  Male who presents with a chief complaint of Right Heart Cath (07 Oct 2021 13:04)      ====================  HPI & Hospital Course:   61M with PMHx of chronic systolic heart failure (bivenctricular failure), factor V leiden (not on anticoagulation), prior aspergillosis, COLLEEN (refuses CPAP) and AML (post-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 Patient presenting here for RHC to guide management given severe heart failure (EF= 15%). Patient found to be anemia with hemoglobin of 6s (baseline 9-10). Unable to get RHC due to anemia; thus, initally transferred to medicine. He was recently admitted to [facility] for falls and started on midodrine to augment BP. Patient seen on 3DSU prior to initiation of transfusions.     On 10/07 pt taken for RHC- observed RA 20, RV 43/11, PA 43/22, PCWP26, PA sat 38. Started on  2.5. On admission to CICU, patient reports increased fatigue. Denies CP, palpitations, SOB.     Home Medications:  acyclovir 400 mg oral tablet: 1 tab(s) orally 2 times a day (06 Oct 2021 14:07)  finasteride 5 mg oral tablet: 1 tab(s) orally once a day (06 Oct 2021 14:07)  folic acid 1 mg oral tablet: 1 tab(s) orally once a day (06 Oct 2021 14:07)  Metoprolol Succinate ER 25 mg oral tablet, extended release: 1 tab(s) orally once a day (06 Oct 2021 14:07)  midodrine 10 mg oral tablet: 1 tab(s) orally 3 times a day, As Needed  if sbp &lt; 95 (06 Oct 2021 14:07)  Multiple Vitamins oral tablet: 1 tab(s) orally once a day (06 Oct 2021 14:07)  Pepcid 20 mg oral tablet: 1 tab(s) orally 2 times a day (06 Oct 2021 14:07)  sertraline 50 mg oral tablet: 1 tab(s) orally once a day (06 Oct 2021 14:07)  tamsulosin 0.4 mg oral capsule: 1 cap(s) orally once a day (at bedtime) (06 Oct 2021 14:07)  torsemide 20 mg oral tablet: 2 tab(s) orally once a day (06 Oct 2021 14:07)  voriconazole 200 mg oral tablet: 1 tab(s) orally every 12 hours (06 Oct 2021 14:07)  ZyrTEC 10 mg oral tablet: 1 tab(s) orally once a day (06 Oct 2021 14:07)      Current Medications:   MEDICATIONS  (STANDING):  acyclovir   Oral Tab/Cap 400 milliGRAM(s) Oral two times a day  finasteride 5 milliGRAM(s) Oral daily  folic acid 1 milliGRAM(s) Oral daily  furosemide   Injectable 40 milliGRAM(s) IV Push two times a day  influenza   Vaccine 0.5 milliLiter(s) IntraMuscular once  multivitamin 1 Tablet(s) Oral daily  pantoprazole    Tablet 40 milliGRAM(s) Oral before breakfast  sertraline 50 milliGRAM(s) Oral daily  sodium chloride 0.9% lock flush 3 milliLiter(s) IV Push every 8 hours  tamsulosin 0.4 milliGRAM(s) Oral at bedtime  voriconazole 200 milliGRAM(s) Oral every 12 hours    MEDICATIONS  (PRN):  acetaminophen   Tablet .. 650 milliGRAM(s) Oral every 6 hours PRN Temp greater or equal to 38C (100.4F), Mild Pain (1 - 3)  ALPRAZolam 0.5 milliGRAM(s) Oral at bedtime PRN Anxiety  diphenhydrAMINE 25 milliGRAM(s) Oral every 4 hours PRN Rash and/or Itching      ====================  Vital Signs Last 24 Hrs  T(C): 36.5 (07 Oct 2021 12:50), Max: 36.9 (07 Oct 2021 09:15)  T(F): 97.7 (07 Oct 2021 12:50), Max: 98.4 (07 Oct 2021 09:15)  HR: 85 (07 Oct 2021 15:40) (81 - 99)  BP: 91/58 (07 Oct 2021 15:40) (91/58 - 109/78)  BP(mean): 70 (07 Oct 2021 15:40) (70 - 70)  RR: 16 (07 Oct 2021 15:40) (15 - 22)  SpO2: 97% (07 Oct 2021 15:40) (95% - 100%)    Adult Advanced Hemodynamics Last 24 Hrs  CVP(mm Hg): 16 (07 Oct 2021 15:40) (16 - 16)  CVP(cm H2O): --  CO: --  CI: --  PA: 40/22 (07 Oct 2021 15:40) (40/22 - 40/22)  PA(mean): 30 (07 Oct 2021 15:40) (30 - 30)  PCWP: --  SVR: --  SVRI: --  PVR: --  PVRI: --    Physical Exam:   General: NAD  HEENT: EOMI, normal sclera and conjunctiva, no oral lesions  Neck: Supple, no JVD  Lungs: CTA bilaterally  Heart: RRR, normal S1S2, no murmurs/rubs/gallops  Abdomen: Soft, ND/NT  Extremities: 2+ peripheral pulses, no cyanosis/clubbing/edema, full ROM  Skin: Warm, well-perfused  Neuro: A&O x3, no focal deficits      ====================  Labs & Imaging:   CBC Full  -  ( 07 Oct 2021 06:27 )  WBC Count : 4.74 K/uL  RBC Count : 2.65 M/uL  Hemoglobin : 8.4 g/dL  Hematocrit : 25.0 %  Platelet Count - Automated : 64 K/uL  Mean Cell Volume : 94.3 fl  Mean Cell Hemoglobin : 31.7 pg  Mean Cell Hemoglobin Concentration : 33.6 gm/dL  Auto Neutrophil # : 2.70 K/uL  Auto Lymphocyte # : 1.00 K/uL  Auto Monocyte # : 0.63 K/uL  Auto Eosinophil # : 0.42 K/uL  Auto Basophil # : 0.00 K/uL  Auto Neutrophil % : 57.0 %  Auto Lymphocyte % : 21.0 %  Auto Monocyte % : 13.2 %  Auto Eosinophil % : 8.8 %  Auto Basophil % : 0.0 %    10-07    139  |  101  |  24<H>  ----------------------------<  124<H>  4.0   |  22  |  1.09    Ca    8.9      07 Oct 2021 06:27  Phos  3.8     10-07  Mg     1.9     10-07    TPro  5.4<L>  /  Alb  3.7  /  TBili  1.4<H>  /  DBili  x   /  AST  51<H>  /  ALT  60<H>  /  AlkPhos  259<H>  10-07              ====================  Assessment & Plan:     ====================      Celestine Kenney MD PGY1 CCU Accept Note    DI CONTRERAS  61y  Patient is a 61y old  Male who presents with a chief complaint of Right Heart Cath (07 Oct 2021 13:04)      ====================  HPI & Hospital Course:   61M with PMHx of chronic systolic heart failure (bivenctricular failure), factor V leiden (not on anticoagulation), prior aspergillosis, COLLEEN (refuses CPAP) and AML (post-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 Patient presenting here for RHC to guide management given severe heart failure (EF= 15%). Patient found to be anemia with hemoglobin of 6s (baseline 9-10). Unable to get RHC due to anemia; thus, initally transferred to medicine. He was recently admitted to [facility] for falls and started on midodrine to augment BP. Patient seen on 3DSU prior to initiation of transfusions.     On 10/07 pt taken for RHC- observed RA 20, RV 43/11, PA 43/22, PCWP26, PA sat 38. Left in PA catheter d/t  hemodynamics. Started on  2.5. On admission to CICU, patient reports increased fatigue. Denies CP, palpitations, SOB.     Home Medications:  acyclovir 400 mg oral tablet: 1 tab(s) orally 2 times a day (06 Oct 2021 14:07)  finasteride 5 mg oral tablet: 1 tab(s) orally once a day (06 Oct 2021 14:07)  folic acid 1 mg oral tablet: 1 tab(s) orally once a day (06 Oct 2021 14:07)  Metoprolol Succinate ER 25 mg oral tablet, extended release: 1 tab(s) orally once a day (06 Oct 2021 14:07)  midodrine 10 mg oral tablet: 1 tab(s) orally 3 times a day, As Needed  if sbp &lt; 95 (06 Oct 2021 14:07)  Multiple Vitamins oral tablet: 1 tab(s) orally once a day (06 Oct 2021 14:07)  Pepcid 20 mg oral tablet: 1 tab(s) orally 2 times a day (06 Oct 2021 14:07)  sertraline 50 mg oral tablet: 1 tab(s) orally once a day (06 Oct 2021 14:07)  tamsulosin 0.4 mg oral capsule: 1 cap(s) orally once a day (at bedtime) (06 Oct 2021 14:07)  torsemide 20 mg oral tablet: 2 tab(s) orally once a day (06 Oct 2021 14:07)  voriconazole 200 mg oral tablet: 1 tab(s) orally every 12 hours (06 Oct 2021 14:07)  ZyrTEC 10 mg oral tablet: 1 tab(s) orally once a day (06 Oct 2021 14:07)      Current Medications:   MEDICATIONS  (STANDING):  acyclovir   Oral Tab/Cap 400 milliGRAM(s) Oral two times a day  finasteride 5 milliGRAM(s) Oral daily  folic acid 1 milliGRAM(s) Oral daily  furosemide   Injectable 40 milliGRAM(s) IV Push two times a day  influenza   Vaccine 0.5 milliLiter(s) IntraMuscular once  multivitamin 1 Tablet(s) Oral daily  pantoprazole    Tablet 40 milliGRAM(s) Oral before breakfast  sertraline 50 milliGRAM(s) Oral daily  sodium chloride 0.9% lock flush 3 milliLiter(s) IV Push every 8 hours  tamsulosin 0.4 milliGRAM(s) Oral at bedtime  voriconazole 200 milliGRAM(s) Oral every 12 hours    MEDICATIONS  (PRN):  acetaminophen   Tablet .. 650 milliGRAM(s) Oral every 6 hours PRN Temp greater or equal to 38C (100.4F), Mild Pain (1 - 3)  ALPRAZolam 0.5 milliGRAM(s) Oral at bedtime PRN Anxiety  diphenhydrAMINE 25 milliGRAM(s) Oral every 4 hours PRN Rash and/or Itching      ====================  Vital Signs Last 24 Hrs  T(C): 36.5 (07 Oct 2021 12:50), Max: 36.9 (07 Oct 2021 09:15)  T(F): 97.7 (07 Oct 2021 12:50), Max: 98.4 (07 Oct 2021 09:15)  HR: 85 (07 Oct 2021 15:40) (81 - 99)  BP: 91/58 (07 Oct 2021 15:40) (91/58 - 109/78)  BP(mean): 70 (07 Oct 2021 15:40) (70 - 70)  RR: 16 (07 Oct 2021 15:40) (15 - 22)  SpO2: 97% (07 Oct 2021 15:40) (95% - 100%)    Adult Advanced Hemodynamics Last 24 Hrs  CVP(mm Hg): 16 (07 Oct 2021 15:40) (16 - 16)  CVP(cm H2O): --  CO: --  CI: --  PA: 40/22 (07 Oct 2021 15:40) (40/22 - 40/)  PA(mean): 30 (07 Oct 2021 15:40) (30 - 30)  PCWP: --  SVR: --  SVRI: --  PVR: --  PVRI: --    Physical Exam:   General: NAD  HEENT: EOMI, normal sclera and conjunctiva, no oral lesions  Neck: Supple, no JVD  Lungs: CTA bilaterally  Heart: RRR, normal S1S2, no murmurs/rubs/gallops  Abdomen: Soft, ND/NT  Extremities: 2+ peripheral pulses, b/l LE lesions.   Skin: Warm, well-perfused  Neuro: A&O x3, no focal deficits      ====================  Labs & Imaging:   CBC Full  -  ( 07 Oct 2021 06:27 )  WBC Count : 4.74 K/uL  RBC Count : 2.65 M/uL  Hemoglobin : 8.4 g/dL  Hematocrit : 25.0 %  Platelet Count - Automated : 64 K/uL  Mean Cell Volume : 94.3 fl  Mean Cell Hemoglobin : 31.7 pg  Mean Cell Hemoglobin Concentration : 33.6 gm/dL  Auto Neutrophil # : 2.70 K/uL  Auto Lymphocyte # : 1.00 K/uL  Auto Monocyte # : 0.63 K/uL  Auto Eosinophil # : 0.42 K/uL  Auto Basophil # : 0.00 K/uL  Auto Neutrophil % : 57.0 %  Auto Lymphocyte % : 21.0 %  Auto Monocyte % : 13.2 %  Auto Eosinophil % : 8.8 %  Auto Basophil % : 0.0 %    10-07    139  |  101  |  24<H>  ----------------------------<  124<H>  4.0   |  22  |  1.09    Ca    8.9      07 Oct 2021 06:27  Phos  3.8     10-07  Mg     1.9     10-07    TPro  5.4<L>  /  Alb  3.7  /  TBili  1.4<H>  /  DBili  x   /  AST  51<H>  /  ALT  60<H>  /  AlkPhos  259<H>  10-07          ====================  Assessment & Plan:         ====================ASSESSMENT AND PLAN==============    ====================== NEUROLOGY=====================  Sedation [ ]Yes   [ ] No  Delirium [ ]Yes   [ ] No    acetaminophen   Tablet .. 650 milliGRAM(s) Oral every 6 hours PRN Temp greater or equal to 38C (100.4F), Mild Pain (1 - 3)  ALPRAZolam 0.5 milliGRAM(s) Oral at bedtime PRN Anxiety  diphenhydrAMINE 25 milliGRAM(s) Oral every 4 hours PRN Rash and/or Itching  sertraline 50 milliGRAM(s) Oral daily    ====================CARDIOVASCULAR==================        furosemide   Injectable 40 milliGRAM(s) IV Push two times a day  tamsulosin 0.4 milliGRAM(s) Oral at bedtime      ==================== RESPIRATORY======================  Mechanical Ventilation [ ]    BIPAP [ ]   HFNC [ ]   NC [ ]                 ===================== RENAL =========================    10-06-21 @ 07:01  -  10-07-21 @ 07:00  --------------------------------------------------------  IN: 930 mL / OUT: 200 mL / NET: 730 mL    10-07-21 @ 07:01  -  10-07-21 @ 16:27  --------------------------------------------------------  IN: 0 mL / OUT: 575 mL / NET: -575 mL      ==================== GASTROINTESTINAL===================    Diet:  Last BM:   Indication for Stress Ulcer Prophylaxis, [ ] Yes    [ ] No   If Yes, Medication:     folic acid 1 milliGRAM(s) Oral daily  multivitamin 1 Tablet(s) Oral daily  pantoprazole    Tablet 40 milliGRAM(s) Oral before breakfast  sodium chloride 0.9% lock flush 3 milliLiter(s) IV Push every 8 hours    ========================INFECTIOUS DISEASE================  T(C): 36.5 (10-07-21 @ 12:50), Max: 36.9 (10-07-21 @ 09:15)  WBC Count: 4.74 K/uL (10-07-21 @ 06:27)  WBC Count: 4.32 K/uL (10-06-21 @ 12:45)  WBC Count: 5.18 K/uL (10-06-21 @ 12:00)      acyclovir   Oral Tab/Cap 400 milliGRAM(s) Oral two times a day  voriconazole 200 milliGRAM(s) Oral every 12 hours    ===================HEMATOLOGIC/ONC ===================  Hemoglobin: 8.4 g/dL (10-07-21 @ 06:27)  Hemoglobin: 6.2 g/dL (10-06-21 @ 12:45)  Hemoglobin: 6.6 g/dL (10-06-21 @ 12:00)    Platelet Count - Automated: 64 K/uL (10-07-21 @ 06:27)  Platelet Count - Automated: 63 K/uL (10-06-21 @ 12:45)  Platelet Count - Automated: 66 K/uL (10-06-21 @ 12:00)    Chemical VTE Prophylaxis:  [ ] Lovenox    [ ] SQH   [ ]NA  Systemic Anticogaulation:  [ ] Yes    [ ] No,  If Yes, Medication and Indication:       =======================    ENDOCRINE  =====================      finasteride 5 milliGRAM(s) Oral daily    ======================= LINES/TUBES  =====================  [place]: (Date placed) 10/07        Celestine Kenney MD PGY1 CCU Accept Note    DI CONTRERAS  61y  Patient is a 61y old  Male who presents with a chief complaint of Right Heart Cath (07 Oct 2021 13:04)      ====================  HPI & Hospital Course:   61M with PMHx of chronic systolic heart failure (bivenctricular failure), factor V leiden (not on anticoagulation), prior aspergillosis, COLLEEN (refuses CPAP) and AML (post-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 Patient presenting here for RHC to guide management given severe heart failure (EF= 15%). Patient found to be anemia with hemoglobin of 6s (baseline 9-10). Unable to get RHC due to anemia; thus, initally transferred to medicine. He was recently admitted to [facility] for falls and started on midodrine to augment BP. Patient seen on 3DSU prior to initiation of transfusions.     On 10/07 pt taken for RHC- observed RA 20, RV 43/11, PA 43/22, PCWP26, PA sat 38. Left in PA catheter d/t  hemodynamics. Started on  2.5. On admission to CICU, patient reports increased fatigue. Denies CP, palpitations, SOB.     Home Medications:  acyclovir 400 mg oral tablet: 1 tab(s) orally 2 times a day (06 Oct 2021 14:07)  finasteride 5 mg oral tablet: 1 tab(s) orally once a day (06 Oct 2021 14:07)  folic acid 1 mg oral tablet: 1 tab(s) orally once a day (06 Oct 2021 14:07)  Metoprolol Succinate ER 25 mg oral tablet, extended release: 1 tab(s) orally once a day (06 Oct 2021 14:07)  midodrine 10 mg oral tablet: 1 tab(s) orally 3 times a day, As Needed  if sbp &lt; 95 (06 Oct 2021 14:07)  Multiple Vitamins oral tablet: 1 tab(s) orally once a day (06 Oct 2021 14:07)  Pepcid 20 mg oral tablet: 1 tab(s) orally 2 times a day (06 Oct 2021 14:07)  sertraline 50 mg oral tablet: 1 tab(s) orally once a day (06 Oct 2021 14:07)  tamsulosin 0.4 mg oral capsule: 1 cap(s) orally once a day (at bedtime) (06 Oct 2021 14:07)  torsemide 20 mg oral tablet: 2 tab(s) orally once a day (06 Oct 2021 14:07)  voriconazole 200 mg oral tablet: 1 tab(s) orally every 12 hours (06 Oct 2021 14:07)  ZyrTEC 10 mg oral tablet: 1 tab(s) orally once a day (06 Oct 2021 14:07)      Current Medications:   MEDICATIONS  (STANDING):  acyclovir   Oral Tab/Cap 400 milliGRAM(s) Oral two times a day  finasteride 5 milliGRAM(s) Oral daily  folic acid 1 milliGRAM(s) Oral daily  furosemide   Injectable 40 milliGRAM(s) IV Push two times a day  influenza   Vaccine 0.5 milliLiter(s) IntraMuscular once  multivitamin 1 Tablet(s) Oral daily  pantoprazole    Tablet 40 milliGRAM(s) Oral before breakfast  sertraline 50 milliGRAM(s) Oral daily  sodium chloride 0.9% lock flush 3 milliLiter(s) IV Push every 8 hours  tamsulosin 0.4 milliGRAM(s) Oral at bedtime  voriconazole 200 milliGRAM(s) Oral every 12 hours    MEDICATIONS  (PRN):  acetaminophen   Tablet .. 650 milliGRAM(s) Oral every 6 hours PRN Temp greater or equal to 38C (100.4F), Mild Pain (1 - 3)  ALPRAZolam 0.5 milliGRAM(s) Oral at bedtime PRN Anxiety  diphenhydrAMINE 25 milliGRAM(s) Oral every 4 hours PRN Rash and/or Itching      ====================  Vital Signs Last 24 Hrs  T(C): 36.5 (07 Oct 2021 12:50), Max: 36.9 (07 Oct 2021 09:15)  T(F): 97.7 (07 Oct 2021 12:50), Max: 98.4 (07 Oct 2021 09:15)  HR: 85 (07 Oct 2021 15:40) (81 - 99)  BP: 91/58 (07 Oct 2021 15:40) (91/58 - 109/78)  BP(mean): 70 (07 Oct 2021 15:40) (70 - 70)  RR: 16 (07 Oct 2021 15:40) (15 - 22)  SpO2: 97% (07 Oct 2021 15:40) (95% - 100%)    Adult Advanced Hemodynamics Last 24 Hrs  CVP(mm Hg): 16 (07 Oct 2021 15:40) (16 - 16)  CVP(cm H2O): --  CO: --  CI: --  PA: 40/22 (07 Oct 2021 15:40) (40/22 - 40/)  PA(mean): 30 (07 Oct 2021 15:40) (30 - 30)  PCWP: --  SVR: --  SVRI: --  PVR: --  PVRI: --    Physical Exam:   General: NAD  HEENT: EOMI, normal sclera and conjunctiva, no oral lesions  Neck: Supple, no JVD  Lungs: CTA bilaterally  Heart: RRR, normal S1S2, no murmurs/rubs/gallops  Abdomen: Soft, ND/NT  Extremities: 2+ peripheral pulses, b/l LE lesions.   Skin: Warm, well-perfused  Neuro: A&O x3, no focal deficits      ====================  Labs & Imaging:   CBC Full  -  ( 07 Oct 2021 06:27 )  WBC Count : 4.74 K/uL  RBC Count : 2.65 M/uL  Hemoglobin : 8.4 g/dL  Hematocrit : 25.0 %  Platelet Count - Automated : 64 K/uL  Mean Cell Volume : 94.3 fl  Mean Cell Hemoglobin : 31.7 pg  Mean Cell Hemoglobin Concentration : 33.6 gm/dL  Auto Neutrophil # : 2.70 K/uL  Auto Lymphocyte # : 1.00 K/uL  Auto Monocyte # : 0.63 K/uL  Auto Eosinophil # : 0.42 K/uL  Auto Basophil # : 0.00 K/uL  Auto Neutrophil % : 57.0 %  Auto Lymphocyte % : 21.0 %  Auto Monocyte % : 13.2 %  Auto Eosinophil % : 8.8 %  Auto Basophil % : 0.0 %    10-07    139  |  101  |  24<H>  ----------------------------<  124<H>  4.0   |  22  |  1.09    Ca    8.9      07 Oct 2021 06:27  Phos  3.8     10-07  Mg     1.9     10-07    TPro  5.4<L>  /  Alb  3.7  /  TBili  1.4<H>  /  DBili  x   /  AST  51<H>  /  ALT  60<H>  /  AlkPhos  259<H>  10-07          ====================  Assessment & Plan:         ====================ASSESSMENT AND PLAN==============    ====================== NEUROLOGY=====================    # Anxiety  - PRN Alprazolam 0.5mg bedtime   - Sertraline 50mg daily     ====================CARDIOVASCULAR============    #·  Acute on chronic systolic congestive heart failure. Presumed NICMP LVEF <20%   - Hold Toprol XL 25 mg daily  -  2.5   - C/w Lasix 40 mg IV BID.  - Device: Not an ideal candidate for ICD due to thrombocytopenia   - RHC:  RA 20, RV 43/11, PA 43/22, PCWP26, PA sat 38  - f/u repeat labs 5pm         furosemide   Injectable 40 milliGRAM(s) IV Push two times a day  tamsulosin 0.4 milliGRAM(s) Oral at bedtime      ==================== RESPIRATORY======================  Mechanical Ventilation [ ]    BIPAP [ ]   HFNC [ ]   NC [ ]                 ===================== RENAL =========================    10-06-21 @ 07:01  -  10-07-21 @ 07:00  --------------------------------------------------------  IN: 930 mL / OUT: 200 mL / NET: 730 mL    10-07-21 @ 07:01  -  10-07-21 @ 16:27  --------------------------------------------------------  IN: 0 mL / OUT: 575 mL / NET: -575 mL      ==================== GASTROINTESTINAL===================    Diet:  Last BM:   Indication for Stress Ulcer Prophylaxis, [ ] Yes    [ ] No   If Yes, Medication:     folic acid 1 milliGRAM(s) Oral daily  multivitamin 1 Tablet(s) Oral daily  pantoprazole    Tablet 40 milliGRAM(s) Oral before breakfast  sodium chloride 0.9% lock flush 3 milliLiter(s) IV Push every 8 hours    ========================INFECTIOUS DISEASE================  T(C): 36.5 (10-07-21 @ 12:50), Max: 36.9 (10-07-21 @ 09:15)  WBC Count: 4.74 K/uL (10-07-21 @ 06:27)  WBC Count: 4.32 K/uL (10-06-21 @ 12:45)  WBC Count: 5.18 K/uL (10-06-21 @ 12:00)      acyclovir   Oral Tab/Cap 400 milliGRAM(s) Oral two times a day  voriconazole 200 milliGRAM(s) Oral every 12 hours    ===================HEMATOLOGIC/ONC ===================  Hemoglobin: 8.4 g/dL (10-07-21 @ 06:27)  Hemoglobin: 6.2 g/dL (10-06-21 @ 12:45)  Hemoglobin: 6.6 g/dL (10-06-21 @ 12:00)    Platelet Count - Automated: 64 K/uL (10-07-21 @ 06:27)  Platelet Count - Automated: 63 K/uL (10-06-21 @ 12:45)  Platelet Count - Automated: 66 K/uL (10-06-21 @ 12:00)    Chemical VTE Prophylaxis:  [ ] Lovenox    [ ] SQH   [ ]NA  Systemic Anticogaulation:  [ ] Yes    [ ] No,  If Yes, Medication and Indication:       =======================    ENDOCRINE  =====================      finasteride 5 milliGRAM(s) Oral daily    ======================= LINES/TUBES  =====================  [place]: (Date placed) 10/07        Celestine Kenney MD PGY1 CCU Accept Note    DI CONTRERAS  61y  Patient is a 61y old  Male who presents with a chief complaint of Right Heart Cath (07 Oct 2021 13:04)      ====================  HPI & Hospital Course:   61M with PMHx of chronic systolic heart failure (bivenctricular failure), factor V leiden (not on anticoagulation), prior aspergillosis, COLLEEN (refuses CPAP) and AML (post-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 Patient presenting here for RHC to guide management given severe heart failure (EF= 15%). Patient found to be anemia with hemoglobin of 6s (baseline 9-10). Unable to get RHC due to anemia; thus, initally transferred to medicine. He was recently admitted to [facility] for falls and started on midodrine to augment BP. Patient seen on 3DSU prior to initiation of transfusions.     On 10/07 pt taken for RHC- observed RA 20, RV 43/11, PA 43/22, PCWP26, PA sat 38. Left in PA catheter d/t  hemodynamics. Started on  2.5. On admission to CICU, patient reports increased fatigue. Denies CP, palpitations, SOB.     Home Medications:  acyclovir 400 mg oral tablet: 1 tab(s) orally 2 times a day (06 Oct 2021 14:07)  finasteride 5 mg oral tablet: 1 tab(s) orally once a day (06 Oct 2021 14:07)  folic acid 1 mg oral tablet: 1 tab(s) orally once a day (06 Oct 2021 14:07)  Metoprolol Succinate ER 25 mg oral tablet, extended release: 1 tab(s) orally once a day (06 Oct 2021 14:07)  midodrine 10 mg oral tablet: 1 tab(s) orally 3 times a day, As Needed  if sbp &lt; 95 (06 Oct 2021 14:07)  Multiple Vitamins oral tablet: 1 tab(s) orally once a day (06 Oct 2021 14:07)  Pepcid 20 mg oral tablet: 1 tab(s) orally 2 times a day (06 Oct 2021 14:07)  sertraline 50 mg oral tablet: 1 tab(s) orally once a day (06 Oct 2021 14:07)  tamsulosin 0.4 mg oral capsule: 1 cap(s) orally once a day (at bedtime) (06 Oct 2021 14:07)  torsemide 20 mg oral tablet: 2 tab(s) orally once a day (06 Oct 2021 14:07)  voriconazole 200 mg oral tablet: 1 tab(s) orally every 12 hours (06 Oct 2021 14:07)  ZyrTEC 10 mg oral tablet: 1 tab(s) orally once a day (06 Oct 2021 14:07)      Current Medications:   MEDICATIONS  (STANDING):  acyclovir   Oral Tab/Cap 400 milliGRAM(s) Oral two times a day  finasteride 5 milliGRAM(s) Oral daily  folic acid 1 milliGRAM(s) Oral daily  furosemide   Injectable 40 milliGRAM(s) IV Push two times a day  influenza   Vaccine 0.5 milliLiter(s) IntraMuscular once  multivitamin 1 Tablet(s) Oral daily  pantoprazole    Tablet 40 milliGRAM(s) Oral before breakfast  sertraline 50 milliGRAM(s) Oral daily  sodium chloride 0.9% lock flush 3 milliLiter(s) IV Push every 8 hours  tamsulosin 0.4 milliGRAM(s) Oral at bedtime  voriconazole 200 milliGRAM(s) Oral every 12 hours    MEDICATIONS  (PRN):  acetaminophen   Tablet .. 650 milliGRAM(s) Oral every 6 hours PRN Temp greater or equal to 38C (100.4F), Mild Pain (1 - 3)  ALPRAZolam 0.5 milliGRAM(s) Oral at bedtime PRN Anxiety  diphenhydrAMINE 25 milliGRAM(s) Oral every 4 hours PRN Rash and/or Itching      ====================  Vital Signs Last 24 Hrs  T(C): 36.5 (07 Oct 2021 12:50), Max: 36.9 (07 Oct 2021 09:15)  T(F): 97.7 (07 Oct 2021 12:50), Max: 98.4 (07 Oct 2021 09:15)  HR: 85 (07 Oct 2021 15:40) (81 - 99)  BP: 91/58 (07 Oct 2021 15:40) (91/58 - 109/78)  BP(mean): 70 (07 Oct 2021 15:40) (70 - 70)  RR: 16 (07 Oct 2021 15:40) (15 - 22)  SpO2: 97% (07 Oct 2021 15:40) (95% - 100%)    Adult Advanced Hemodynamics Last 24 Hrs  CVP(mm Hg): 16 (07 Oct 2021 15:40) (16 - 16)  CVP(cm H2O): --  CO: --  CI: --  PA: 40/22 (07 Oct 2021 15:40) (40/22 - 40/)  PA(mean): 30 (07 Oct 2021 15:40) (30 - 30)  PCWP: --  SVR: --  SVRI: --  PVR: --  PVRI: --    Physical Exam:   General: NAD  HEENT: EOMI, normal sclera and conjunctiva, no oral lesions  Neck: Supple, no JVD  Lungs: CTA bilaterally  Heart: RRR, normal S1S2, no murmurs/rubs/gallops  Abdomen: Soft, ND/NT  Extremities: 2+ peripheral pulses, b/l LE lesions.   Skin: Warm, well-perfused  Neuro: A&O x3, no focal deficits      ====================  Labs & Imaging:   CBC Full  -  ( 07 Oct 2021 06:27 )  WBC Count : 4.74 K/uL  RBC Count : 2.65 M/uL  Hemoglobin : 8.4 g/dL  Hematocrit : 25.0 %  Platelet Count - Automated : 64 K/uL  Mean Cell Volume : 94.3 fl  Mean Cell Hemoglobin : 31.7 pg  Mean Cell Hemoglobin Concentration : 33.6 gm/dL  Auto Neutrophil # : 2.70 K/uL  Auto Lymphocyte # : 1.00 K/uL  Auto Monocyte # : 0.63 K/uL  Auto Eosinophil # : 0.42 K/uL  Auto Basophil # : 0.00 K/uL  Auto Neutrophil % : 57.0 %  Auto Lymphocyte % : 21.0 %  Auto Monocyte % : 13.2 %  Auto Eosinophil % : 8.8 %  Auto Basophil % : 0.0 %    10-07    139  |  101  |  24<H>  ----------------------------<  124<H>  4.0   |  22  |  1.09    Ca    8.9      07 Oct 2021 06:27  Phos  3.8     10-07  Mg     1.9     10-07    TPro  5.4<L>  /  Alb  3.7  /  TBili  1.4<H>  /  DBili  x   /  AST  51<H>  /  ALT  60<H>  /  AlkPhos  259<H>  10-07          ====================  Assessment & Plan:         ====================ASSESSMENT AND PLAN==============    ====================== NEUROLOGY=====================    # Anxiety  - PRN Alprazolam 0.5mg bedtime   - Sertraline 50mg daily     ====================CARDIOVASCULAR============    #·  Acute on chronic systolic congestive heart failure. NICMP LVEF <20%   - Hold Toprol XL 25 mg daily  -  2.5   - C/w Lasix 40 mg IV BID  - strict I/Os, monitor daily weights, Cr, and K.  - Device: Not an ideal candidate for ICD due to thrombocytopenia, infections. Per CHF team, consider palliative ionotropes  - RHC:  RA 20, RV 43/11, PA 43/22, PCWP26, PA sat 38  - f/u repeat labs 5pm       furosemide   Injectable 40 milliGRAM(s) IV Push two times a day  tamsulosin 0.4 milliGRAM(s) Oral at bedtime      ==================== RESPIRATORY======================  No active issues      ==================== GASTROINTESTINAL===================    Diet: DASH  Last BM: 10/06  Indication for Stress Ulcer Prophylaxis: Protonix 40 PO    folic acid 1 milliGRAM(s) Oral daily  multivitamin 1 Tablet(s) Oral daily  pantoprazole    Tablet 40 milliGRAM(s) Oral before breakfast  sodium chloride 0.9% lock flush 3 milliLiter(s) IV Push every 8 hours    ========================INFECTIOUS DISEASE================    H/o Aspergillosis and 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  - On voriconazole/Acyclovir    T(C): 36.5 (10-07-21 @ 12:50), Max: 36.9 (10-07-21 @ 09:15)  WBC Count: 4.74 K/uL (10-07-21 @ 06:27)  WBC Count: 4.32 K/uL (10-06-21 @ 12:45)  WBC Count: 5.18 K/uL (10-06-21 @ 12:00)      acyclovir   Oral Tab/Cap 400 milliGRAM(s) Oral two times a day  voriconazole 200 milliGRAM(s) Oral every 12 hours    ===================HEMATOLOGIC/ONC ===================    Thrombocytopenia  - hold AC    Anemia  - s/p transfusion  - - c/w PRN PRBC transfusions for Hgb <8    Factor 5 Leiden Mutation, Heterozygous  - hold AC d/t thrombocytopenia    AML  - s/p chemo   - Off MMF/tacro/prednisone  - As per notes, poor graft function w dropping chimerism  - Was on Vidaza - currently on hold (last in May 2021)  - f/u heme/onc recs.      Hemoglobin: 8.4 g/dL (10-07-21 @ 06:27)  Hemoglobin: 6.2 g/dL (10-06-21 @ 12:45)  Hemoglobin: 6.6 g/dL (10-06-21 @ 12:00)    Platelet Count - Automated: 64 K/uL (10-07-21 @ 06:27)  Platelet Count - Automated: 63 K/uL (10-06-21 @ 12:45)  Platelet Count - Automated: 66 K/uL (10-06-21 @ 12:00)        =======================    ENDOCRINE  =====================    No active issues, glucose well controlled    finasteride 5 milliGRAM(s) Oral daily    ======================= LINES/TUBES  =====================  [place]: (Date placed) 10/07        Celestine Kenney MD PGY1

## 2021-10-07 NOTE — PROGRESS NOTE ADULT - PROBLEM SELECTOR PLAN 2
HF aware of admission; care for patient greatly appreciated.   -Lasix 40 mg IV BID for now  -Hold home midodrine  -Continue with ToprolXL  -No ACEI/ARB given chronic hypotension  -GDMT optimization as tolerated.  -Plan for RHC 10/7.   -Daily weighs & I&Os

## 2021-10-07 NOTE — PROVIDER CONTACT NOTE (OTHER) - REASON
Retaining urine - 922 ml
Shortness of breath
pt c/o itching on BLUEs and chest area during blood transfusion

## 2021-10-07 NOTE — CONSULT NOTE ADULT - SUBJECTIVE AND OBJECTIVE BOX
HPI:  62 y/o male with EXTENSIVE pmh remarkable for chronic systolic HF ACC/AHA stage C-D, presumed NICMP LVEF <20% LVIDd 7cm, AML s/p SCT '19, factor V mutation with h/o DVT/PE, osteomyelitis with R toe amputation, h/o aspergillosis/CMV viremia, thrombocytopenia, former smoker and COLLEEN not on CPAP who presented for elective RHC. Admission labs remarkable for anemia in spite of h/o 2u PRBC transfusions a few weeks PTA, so was admitted to medicine team for anemia.       PAST MEDICAL & SURGICAL HISTORY:  Factor 5 Leiden mutation, heterozygous    H/O cardiomyopathy    Deep vein thrombosis (DVT)    Pulmonary embolism    COLLEEN (obstructive sleep apnea)  as per pt. he tried cpap/ bipap in the past did not like it so never used after that.    Thrombocytopenia    History of hypotension    H/O bone marrow transplant      REVIEW OF SYSTEMS:  14 point ROS negative in detail apart from as documented in HPI.    MEDICATIONS  (STANDING):  acyclovir   Oral Tab/Cap 400 milliGRAM(s) Oral two times a day  finasteride 5 milliGRAM(s) Oral daily  folic acid 1 milliGRAM(s) Oral daily  furosemide   Injectable 40 milliGRAM(s) IV Push two times a day  influenza   Vaccine 0.5 milliLiter(s) IntraMuscular once  metoprolol succinate ER 25 milliGRAM(s) Oral daily  multivitamin 1 Tablet(s) Oral daily  pantoprazole    Tablet 40 milliGRAM(s) Oral before breakfast  sertraline 50 milliGRAM(s) Oral daily  sodium chloride 0.9% lock flush 3 milliLiter(s) IV Push every 8 hours  tamsulosin 0.4 milliGRAM(s) Oral at bedtime  voriconazole 200 milliGRAM(s) Oral every 12 hours    MEDICATIONS  (PRN):  acetaminophen   Tablet .. 650 milliGRAM(s) Oral every 6 hours PRN Temp greater or equal to 38C (100.4F), Mild Pain (1 - 3)  ALPRAZolam 0.5 milliGRAM(s) Oral at bedtime PRN Anxiety  diphenhydrAMINE 25 milliGRAM(s) Oral every 4 hours PRN Rash and/or Itching    Home Medications:  acyclovir 400 mg oral tablet: 1 tab(s) orally 2 times a day (06 Oct 2021 14:07)  finasteride 5 mg oral tablet: 1 tab(s) orally once a day (06 Oct 2021 14:07)  folic acid 1 mg oral tablet: 1 tab(s) orally once a day (06 Oct 2021 14:07)  Metoprolol Succinate ER 25 mg oral tablet, extended release: 1 tab(s) orally once a day (06 Oct 2021 14:07)  midodrine 10 mg oral tablet: 1 tab(s) orally 3 times a day, As Needed  if sbp &lt; 95 (06 Oct 2021 14:07)  Multiple Vitamins oral tablet: 1 tab(s) orally once a day (06 Oct 2021 14:07)  Pepcid 20 mg oral tablet: 1 tab(s) orally 2 times a day (06 Oct 2021 14:07)  sertraline 50 mg oral tablet: 1 tab(s) orally once a day (06 Oct 2021 14:07)  tamsulosin 0.4 mg oral capsule: 1 cap(s) orally once a day (at bedtime) (06 Oct 2021 14:07)  torsemide 20 mg oral tablet: 2 tab(s) orally once a day (06 Oct 2021 14:07)  voriconazole 200 mg oral tablet: 1 tab(s) orally every 12 hours (06 Oct 2021 14:07)  ZyrTEC 10 mg oral tablet: 1 tab(s) orally once a day (06 Oct 2021 14:07)    Allergies  No Known Allergies    Social History:  Patient denies tobacco or IVDU. (06 Oct 2021 13:48)    FAMILY HISTORY:  FH: heart attack  father, mother, and brother        ICU Vital Signs Last 24 Hrs  T(C): 36.9, Max: 36.9 (10-07 @ 09:15)  HR: 90 (89 - 99)  BP: 98/64 (94/63 - 109/78)  BP(mean): --  ABP: --  ABP(mean): --  RR: 18 (18 - 22)  SpO2: 96% (95% - 100%)    Weight in k.7 (10-07-21)  Weight in k.2 (10-07-21)      I&O's Summary Last 24 Hrs    IN: 930 mL / OUT: 200 mL / NET: 730 mL      Tele: SR 70-90s    Physical Exam:    General: No distress. Comfortable.  Neck: JVP elevated.  Respiratory: Clear to auscultation bilaterally  CV: RRR. Normal S1 and S2. No murmurs, rub, or gallops. Radial pulses normal.  Abdomen: Soft, non-distended, non-tender  Skin: No skin lesions  Extremities: Warm, no edema  Neurology: Non-focal, alert and oriented times three.   Psych: Affect normal    Labs:    ( 10-07-21 @ 06:27 )               8.4    4.74  )--------( 64                   25.0     ( 10-07-21 @ 06:27 )     139  |  101  |  24  ---------------------<  124  4.0  |  22  |  1.09    Ca 8.9  Phos 3.8  Mg 1.9    ( 10-07-21 @ 06:27 )  TPro  5.4  /  Alb  3.7  /  TBili  1.4  /  DBili  x   /  AST  51  /  ALT  60  /  AlkPhos  259

## 2021-10-07 NOTE — PROGRESS NOTE ADULT - SUBJECTIVE AND OBJECTIVE BOX
INTERNAL MEDICINE PROGRESS NOTE    Dr. Gordon Lozada, DO  Hospitalist  Division of Hospital Medicine  [facility]  Available via [employer] Teams      SUBJECTIVE:  Orthopnea   GEORGES  No sob at rest.    REVIEW OF SYSTEMS   10 point review of systems negative except for above.     PAST MEDICAL & SURGICAL HISTORY:  Factor 5 Leiden mutation, heterozygous    H/O cardiomyopathy    Deep vein thrombosis (DVT)    Pulmonary embolism    COLLEEN (obstructive sleep apnea)  as per pt. he tried cpap/ bipap in the past did not like it so never used after that.    Thrombocytopenia    History of hypotension    H/O bone marrow transplant        MEDICATIONS  (STANDING):  acyclovir   Oral Tab/Cap 400 milliGRAM(s) Oral two times a day  finasteride 5 milliGRAM(s) Oral daily  folic acid 1 milliGRAM(s) Oral daily  furosemide   Injectable 40 milliGRAM(s) IV Push two times a day  influenza   Vaccine 0.5 milliLiter(s) IntraMuscular once  metoprolol succinate ER 25 milliGRAM(s) Oral daily  multivitamin 1 Tablet(s) Oral daily  pantoprazole    Tablet 40 milliGRAM(s) Oral before breakfast  sertraline 50 milliGRAM(s) Oral daily  sodium chloride 0.9% lock flush 3 milliLiter(s) IV Push every 8 hours  tamsulosin 0.4 milliGRAM(s) Oral at bedtime  voriconazole 200 milliGRAM(s) Oral every 12 hours    MEDICATIONS  (PRN):  acetaminophen   Tablet .. 650 milliGRAM(s) Oral every 6 hours PRN Temp greater or equal to 38C (100.4F), Mild Pain (1 - 3)  ALPRAZolam 0.5 milliGRAM(s) Oral at bedtime PRN Anxiety  diphenhydrAMINE 25 milliGRAM(s) Oral every 4 hours PRN Rash and/or Itching      Allergies    No Known Allergies    Intolerances        T(C): 36.9 (10-07-21 @ 09:15), Max: 36.9 (10-07-21 @ 09:15)  T(F): 98.4 (10-07-21 @ 09:15), Max: 98.4 (10-07-21 @ 09:15)  HR: 90 (10-07-21 @ 09:15) (89 - 99)  BP: 98/64 (10-07-21 @ 09:15) (94/63 - 109/78)  ABP: --  ABP(mean): --  RR: 18 (10-07-21 @ 09:15) (18 - 22)  SpO2: 96% (10-07-21 @ 09:15) (95% - 100%)      CONSTITUTIONAL: No acute distress.   HEENT:  Conjunctiva clear B/L.  Moist oral mucosa.   Cardiovascular: RRR with no murmurs. + JVD noted. 1+ lower extremity edema B/L. Extremities are warm and well perfused. Radial pulses 2+ B/L. Dorsalis pedis pulses 2+ B/L.    Respiratory: Dec bs. No wrr. No accessory muscle use.   Gastrointestinal:  Soft, nontender. Non-distended. Non-rigid. No CVA tenderness B/L.  MSK:  No joint swelling. No joint erythema B/L. No midline spinal tenderness.  Neurologic:  Alert and awake. Moving all extremities. Following commands. Making eye contact.    Psych:  Normal affect. Normal Mood.     LABS                        8.4    4.74  )-----------( 64       ( 07 Oct 2021 06:27 )             25.0     10-07    139  |  101  |  24<H>  ----------------------------<  124<H>  4.0   |  22  |  1.09    Ca    8.9      07 Oct 2021 06:27  Phos  3.8     10-07  Mg     1.9     10-07    TPro  5.4<L>  /  Alb  3.7  /  TBili  1.4<H>  /  DBili  x   /  AST  51<H>  /  ALT  60<H>  /  AlkPhos  259<H>  10-07          ALL RECENT STUDIES REVIEWED INCLUDING REPORTS AVAILABLE     CARE DISCUSSED WITH ALL CONSULTANTS

## 2021-10-07 NOTE — PROGRESS NOTE ADULT - PROBLEM SELECTOR PLAN 1
Suspect a hypoproliferative process with chronic thrombocytopenia as well. Low suspicion for acute blood loss. Heme/Onc consult appreciated.   2 units pRBC nearly done. Lasix iv between units.   Hgb with good response.

## 2021-10-07 NOTE — CONSULT NOTE ADULT - ASSESSMENT
62 y/o male with EXTENSIVE pmh remarkable for chronic systolic HF ACC/AHA stage C-D, presumed NICMP LVEF <20% LVIDd 7cm, AML s/p SCT '19, factor V mutation with h/o DVT/PE, osteomyelitis with R toe amputation, h/o aspergillosis/CMV viremia, thrombocytopenia, former smoker and COLLEEN not on CPAP who presented for elective RHC. Admission labs remarkable for anemia in spite of h/o 2u PRBC transfusions a few weeks PTA, so was admitted to medicine team for anemia. He is currently receiving his 4th 1/2 unit of PRBC and plan is for RHC today. Will leave in PA catheter based on hemodynamics, suspect low output state.     - he has NYHA Class III-IV Stage C nonischemic systolic HF  - HF recs appreciated. plan for RHC   - cont lasix 40mg IV q12  - Please continue to maintain strict I/Os, monitor daily weights, Cr, and K.  - will have better idea if palliative inotropes will help post RHC  - not a candidate for heart transplant or LVAD at this time given his comorbidities    - now transfusion dependent  - Tx PRBCs in order to maintain H/H >8/24  - monitor platelets  - fu with heme recs    - cont toprol  - bp limits HF meds.     - would check orthostatics give multiple recent falls    - Further cardiac workup will depend on clinical course.   - All other workup per primary team. Will followup.

## 2021-10-07 NOTE — CONSULT NOTE ADULT - SUBJECTIVE AND OBJECTIVE BOX
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 Patient presenting here for RHC to guide management given severe heart failure. Patient found to be anemia with hemoglobin of 6s (patient's baseline seems to be 9-10). Unable to get RHC due to anemia; thus, transferred to medicine. He was recently admitted to [facility] for falls and started on midodrine to augment BP. Patient seen on 3DSU prior to initiation of transfusions. He is very nonchalant. He has no complaints, shrugs to most of my questions.  (06 Oct 2021 13:48)    Interval hx: overall poor historian. mildly confused in regards to history. Denies any chest pain, palpitations, PND, orthopnea, near syncope, syncope, lower extremity edema, stroke like symptoms. claims not to have sig martinez at this time.       EKG: SR APCs RBBB TWI c/w lateral ischemia IWMI    REVIEW OF SYSTEMS:   All other review of systems are negative unless indicated above    PAST MEDICAL & SURGICAL HISTORY:  Factor 5 Leiden mutation, heterozygous    H/O cardiomyopathy    Deep vein thrombosis (DVT)    Pulmonary embolism    COLLEEN (obstructive sleep apnea)  as per pt. he tried cpap/ bipap in the past did not like it so never used after that.    Thrombocytopenia    History of hypotension    H/O bone marrow transplant        SOCIAL HISTORY:  No tobacco, ethanol, or drug abuse.    FAMILY HISTORY:  FH: heart attack  father, mother, and brother      No family history of acute MI or sudden cardiac death.    MEDICATIONS  (STANDING):  acyclovir   Oral Tab/Cap 400 milliGRAM(s) Oral two times a day  finasteride 5 milliGRAM(s) Oral daily  folic acid 1 milliGRAM(s) Oral daily  furosemide   Injectable 40 milliGRAM(s) IV Push two times a day  influenza   Vaccine 0.5 milliLiter(s) IntraMuscular once  metoprolol succinate ER 25 milliGRAM(s) Oral daily  multivitamin 1 Tablet(s) Oral daily  pantoprazole    Tablet 40 milliGRAM(s) Oral before breakfast  sertraline 50 milliGRAM(s) Oral daily  sodium chloride 0.9% lock flush 3 milliLiter(s) IV Push every 8 hours  tamsulosin 0.4 milliGRAM(s) Oral at bedtime  voriconazole 200 milliGRAM(s) Oral every 12 hours    MEDICATIONS  (PRN):  acetaminophen   Tablet .. 650 milliGRAM(s) Oral every 6 hours PRN Temp greater or equal to 38C (100.4F), Mild Pain (1 - 3)  ALPRAZolam 0.5 milliGRAM(s) Oral at bedtime PRN Anxiety  diphenhydrAMINE 25 milliGRAM(s) Oral every 4 hours PRN Rash and/or Itching      Allergies    No Known Allergies    Intolerances        Home meds:  Home Medications:  acyclovir 400 mg oral tablet: 1 tab(s) orally 2 times a day (06 Oct 2021 14:07)  finasteride 5 mg oral tablet: 1 tab(s) orally once a day (06 Oct 2021 14:07)  folic acid 1 mg oral tablet: 1 tab(s) orally once a day (06 Oct 2021 14:07)  Metoprolol Succinate ER 25 mg oral tablet, extended release: 1 tab(s) orally once a day (06 Oct 2021 14:07)  midodrine 10 mg oral tablet: 1 tab(s) orally 3 times a day, As Needed  if sbp &lt; 95 (06 Oct 2021 14:07)  Multiple Vitamins oral tablet: 1 tab(s) orally once a day (06 Oct 2021 14:07)  Pepcid 20 mg oral tablet: 1 tab(s) orally 2 times a day (06 Oct 2021 14:07)  sertraline 50 mg oral tablet: 1 tab(s) orally once a day (06 Oct 2021 14:07)  tamsulosin 0.4 mg oral capsule: 1 cap(s) orally once a day (at bedtime) (06 Oct 2021 14:07)  torsemide 20 mg oral tablet: 2 tab(s) orally once a day (06 Oct 2021 14:07)  voriconazole 200 mg oral tablet: 1 tab(s) orally every 12 hours (06 Oct 2021 14:07)  ZyrTEC 10 mg oral tablet: 1 tab(s) orally once a day (06 Oct 2021 14:07)        VITAL SIGNS:   Vital Signs Last 24 Hrs  T(C): 36.9 (07 Oct 2021 09:15), Max: 36.9 (07 Oct 2021 09:15)  T(F): 98.4 (07 Oct 2021 09:15), Max: 98.4 (07 Oct 2021 09:15)  HR: 85 (07 Oct 2021 12:50) (85 - 99)  BP: 91/68 (07 Oct 2021 12:50) (91/68 - 109/78)  BP(mean): --  RR: 18 (07 Oct 2021 12:50) (18 - 22)  SpO2: 96% (07 Oct 2021 09:15) (95% - 100%)    I&O's Summary    06 Oct 2021 07:01  -  07 Oct 2021 07:00  --------------------------------------------------------  IN: 930 mL / OUT: 200 mL / NET: 730 mL    07 Oct 2021 07:01  -  07 Oct 2021 13:04  --------------------------------------------------------  IN: 0 mL / OUT: 575 mL / NET: -575 mL        On Exam:  TELE:   Constitutional: NAD, awake    HEENT: Moist Mucous Membranes, Anicteric  Pulmonary: Decreased breath sounds b/l. No rales, crackles or wheeze appreciated.   Cardiovascular: Regular, S1 and S2, No murmurs, rubs, gallops or clicks  Gastrointestinal: Bowel Sounds present, soft, nontender.   Lymph: trace peripheral edema. No lymphadenopathy.  Skin: No visible rashes or ulcers.  Psych:  Mood & affect appropriate    LABS: All Labs Reviewed:                        8.4    4.74  )-----------( 64       ( 07 Oct 2021 06:27 )             25.0                         6.2    4.32  )-----------( 63       ( 06 Oct 2021 12:45 )             18.9                         6.6    5.18  )-----------( 66       ( 06 Oct 2021 12:00 )             20.8     07 Oct 2021 06:27    139    |  101    |  24     ----------------------------<  124    4.0     |  22     |  1.09   06 Oct 2021 12:00    138    |  101    |  21     ----------------------------<  160    4.0     |  20     |  0.96     Ca    8.9        07 Oct 2021 06:27  Ca    8.6        06 Oct 2021 12:00  Phos  3.8       07 Oct 2021 06:27  Mg     1.9       07 Oct 2021 06:27    TPro  5.4    /  Alb  3.7    /  TBili  1.4    /  DBili  x      /  AST  51     /  ALT  60     /  AlkPhos  259    07 Oct 2021 06:27          Blood Culture:         RADIOLOGY:  < from: TTE Echo Complete w/o Contrast w/ Doppler (08.17.21 @ 12:35) >    EXAM:  ECHO TTE WO CON COMP W DOPP      PROCEDURE DATE:  Aug 17 2021   .      INTERPRETATION:  TRANSTHORACIC ECHOCARDIOGRAM REPORT        Patient Name:   DI CONTRERAS Patient Location: [facility] Rec #:  [id number]      Accession #:      [id number]  Account #:                    Height:           72.8 in 185.0 cm  YOB: 1960     Weight:           174.2 lb 79.00 kg  Patient Age:    61 years      BSA:              2.03 m²  Patient Gender: M             BP:               100/67mmHg      Date of Exam:        8/17/2021 12:35:15 PM  Sonographer:         Deion Shah  Referring Physician: Maricruz Gillespie MD    Procedure:   2D Echo/Doppler/Color Doppler Complete.  Indications: Abnormal electrocardiogram [ECG] [EKG] - R94.31  Diagnosis:   Abnormal electrocardiogram [ECG] [EKG] - R94.31        2D AND M-MODE MEASUREMENTS (normal ranges within parentheses):  Left                 Normal   Aorta/Left            Normal  Ventricle:                    Atrium:  IVSd (2D):    0.88  (0.7-1.1) Left Atrium    4.51  (1.9-4.0)                 cm             (2D):           cm  LVPWd (2D):   1.05  (0.7-1.1) LA Volume      53.6                 cm             Index         ml/m²  LVIDd (2D):   7.00  (3.4-5.7) Right Ventricle:      cm             RVd (2D):        3.57 cm  LVIDs (2D):   6.57            TAPSE:           1.52 cm                 cm  LV FS (2D):   6.1 %  (>25%)  Relative Wall 0.30   (<0.42)  Thickness    LV SYSTOLIC FUNCTION BY 2D PLANIMETRY (MOD):  EF-Biplane: 15 %    LV DIASTOLIC FUNCTION:  MV Peak E: 0.65 m/s E/e' Ratio: 13.60  MV Peak A: 0.35 m/s  E/A Ratio: 1.88    SPECTRAL DOPPLER ANALYSIS (where applicable):  Tricuspid Valve and PA/RV Systolic Pressure: TR Max Velocity: 2.48 m/s RA Pressure: 3 mmHg RVSP/PASP: 27.6 mmHg      PHYSICIAN INTERPRETATION:  Left Ventricle: Endocardial visualization was enhanced with intravenous echo contrast. The left ventricular internal cavity size is severely increased.  Global LV systolic function was severely decreased. Left ventricular ejection fraction, by visual estimation, is <20%. Spectral Doppler shows pseudonormal pattern of left ventricular myocardial filling (Grade II diastolic dysfunction). No evidence of LV thrombus.  Right Ventricle: The right ventricular size is normal. RV systolic function is severely reduced. TV S' 0.1 m/s.  Left Atrium: Severely enlarged left atrium.  Right Atrium: Mildly enlarged right atrium.  Pericardium: A small pericardial effusion is present. The pericardial effusion isglobally located around the entire heart. There is a large pleural effusion in the left lateral region.  Mitral Valve: The mitral valve is normal in structure. There is mild mitral annular calcification. Trace mitral valve regurgitation is seen.  Tricuspid Valve: The tricuspid valve is normal in structure. Mild tricuspid regurgitation is visualized.  Aortic Valve: The aortic valve is trileaflet. No evidence of aortic valve regurgitation is seen.  Pulmonic Valve: Mild to moderate pulmonic valve regurgitation.  Aorta: Aortic root measured at Sinus of Valsalva is dilated. Aortic Root measuring 4.08 cm at Sinus level.  Ascending Aorta measuring 3.97 cm.  Venous: The inferior vena cava is normal.      Summary:   1. Left ventricular ejection fraction, by visual estimation, is <20%.   2. Severely decreased global left ventricular systolic function.   3. Severely enlarged left atrium.   4. Severely increased left ventricular internal cavity size.   5. Spectral Doppler shows pseudonormal pattern of left ventricular myocardial filling (Grade II diastolic dysfunction).   6. No evidence of LV thrombus.   7. Severely reduced RV systolic function.   8. Mildly enlarged right atrium.   9. Large pleural effusion in the left lateral region.  10. Small pericardial effusion.  11. Mild mitral annular calcification.  12. Trace mitral valve regurgitation.  13. Mild tricuspid regurgitation.  14. Mild to moderate pulmonic valve regurgitation.  15. Aortic Root measuring 4.08 cm at Sinus level.      Ascending Aorta measuring 3.97 cm.  16. Aortic root measured at Sinus of Valsalva is dilated.  17. Endocardial visualization was enhanced with intravenous echo contrast.    MD Be Electronically signed on 8/17/2021 at 2:37:10 PM                *** Final ***              APARNA DELCID MD; Attending Cardiologist  This document has been electronically signed. Aug 17 2021 12:35PM    < end of copied text >

## 2021-10-07 NOTE — PROVIDER CONTACT NOTE (OTHER) - SITUATION
Patient only had 1x incontinent count during shift. Voiding encouraged all throughout shift. Upon bladder scan, 922ml found.

## 2021-10-07 NOTE — PROVIDER CONTACT NOTE (OTHER) - ACTION/TREATMENT ORDERED:
Give lasix 20mg x 1 dose NOW. Ross Castañeda NP made aware. Will continue to monitor.
MD to order 25mg Benadryl PO q 4 hours. pt to receive 4, 1/2 unit blood transfusions overnight.
Pt. refusing straight catheterization at this time. Will re-attempt in 1hr if patient does not void. Ross Castañeda, YADIRA made aware.

## 2021-10-07 NOTE — PROGRESS NOTE ADULT - SUBJECTIVE AND OBJECTIVE BOX
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 Patient presenting here for RHC to guide management given severe heart failure. Patient found to be anemia with hemoglobin of 6s (patient's baseline seems to be 9-10). Unable to get RHC due to anemia; thus, transferred to medicine. He was recently admitted to [facility] for falls and started on midodrine to augment BP. Patient seen on 3DSU prior to initiation of transfusions. He is very nonchalant. He has no complaints, shrugs to most of my questions.  (06 Oct 2021 13:48)      24 HOUR EVENTS:    REVIEW OF SYSTEMS:    CONSTITUTIONAL: No weakness, fevers or chills  EYES/ENT: No visual changes;  No vertigo or throat pain   NECK: No pain or stiffness  RESPIRATORY: No cough, wheezing, hemoptysis; No shortness of breath  CARDIOVASCULAR: No chest pain or palpitations  GASTROINTESTINAL: No abdominal or epigastric pain. No nausea, vomiting, or hematemesis; No diarrhea or constipation. No melena or hematochezia.  GENITOURINARY: No dysuria, frequency or hematuria  NEUROLOGICAL: No numbness or weakness  SKIN: No itching, rashes      ICU Vital Signs Last 24 Hrs  T(C): 36.5 (07 Oct 2021 12:50), Max: 36.9 (07 Oct 2021 09:15)  T(F): 97.7 (07 Oct 2021 12:50), Max: 98.4 (07 Oct 2021 09:15)  HR: 82 (07 Oct 2021 18:00) (81 - 99)  BP: 100/63 (07 Oct 2021 18:00) (91/58 - 109/78)  BP(mean): 77 (07 Oct 2021 18:00) (70 - 77)  ABP: --  ABP(mean): --  RR: 16 (07 Oct 2021 15:40) (15 - 22)  SpO2: 97% (07 Oct 2021 18:00) (91% - 100%)      CVP(mm Hg): 8 (10-07-21 @ 18:00) (6 - 16)  CO: --  CI: --  PA: 28/17 (10-07-21 @ 18:00) (19/16 - 40/22)  PA(mean): 23 (10-07-21 @ 18:00) (17 - 30)  PA(direct): --  PCWP: --  LA: --  RA: --  SVR: --  SVRI: --  PVR: --  PVRI: --  I&O's Summary    06 Oct 2021 07:01  -  07 Oct 2021 07:00  --------------------------------------------------------  IN: 930 mL / OUT: 200 mL / NET: 730 mL    07 Oct 2021 07:01  -  07 Oct 2021 19:41  --------------------------------------------------------  IN: 144.6 mL / OUT: 575 mL / NET: -430.4 mL        CAPILLARY BLOOD GLUCOSE    CAPILLARY BLOOD GLUCOSE          PHYSICAL EXAM:  GENERAL: No acute distress, well-developed  HEAD:  Atraumatic, Normocephalic  EYES: EOMI, PERRLA, conjunctiva and sclera clear  NECK: Supple, no lymphadenopathy, no JVD  CHEST/LUNG: CTAB; No wheezes, rales, or rhonchi  HEART: Regular rate and rhythm. Normal S1/S2. No murmurs, rubs, or gallops  ABDOMEN: Soft, non-tender, non-distended; normal bowel sounds, no organomegaly  EXTREMITIES:  2+ peripheral pulses b/l, No clubbing, cyanosis, or edema  NEUROLOGY: A&O x 3, no focal deficits  SKIN: No rashes or lesions    ============================I/O===========================   I&O's Detail    06 Oct 2021 07:01  -  07 Oct 2021 07:00  --------------------------------------------------------  IN:    Oral Fluid: 480 mL    PRBCs (Packed Red Blood Cells): 450 mL  Total IN: 930 mL    OUT:    Voided (mL): 200 mL  Total OUT: 200 mL    Total NET: 730 mL      07 Oct 2021 07:01  -  07 Oct 2021 19:41  --------------------------------------------------------  IN:    DOBUTamine: 24.6 mL    Oral Fluid: 120 mL  Total IN: 144.6 mL    OUT:    Voided (mL): 575 mL  Total OUT: 575 mL    Total NET: -430.4 mL        ============================ LABS =========================                        8.4    4.74  )-----------( 64       ( 07 Oct 2021 06:27 )             25.0     10-07    139  |  101  |  24<H>  ----------------------------<  124<H>  4.0   |  22  |  1.09    Ca    8.9      07 Oct 2021 06:27  Phos  3.8     10-07  Mg     1.9     10-07    TPro  5.4<L>  /  Alb  3.7  /  TBili  1.4<H>  /  DBili  x   /  AST  51<H>  /  ALT  60<H>  /  AlkPhos  259<H>  10-07                LIVER FUNCTIONS - ( 07 Oct 2021 06:27 )  Alb: 3.7 g/dL / Pro: 5.4 g/dL / ALK PHOS: 259 U/L / ALT: 60 U/L / AST: 51 U/L / GGT: x               Blood Gas Venous - Lactate: 0.9 mmol/L (10-07-21 @ 16:32)      ======================Micro/Rad/Cardio=================  Telemetry: Reviewed   EKG: Reviewed  CXR: Reviewed  Culture: Reviewed   Echo:   Cath:   ======================================================  PAST MEDICAL & SURGICAL HISTORY:  Factor 5 Leiden mutation, heterozygous    H/O cardiomyopathy    Deep vein thrombosis (DVT)    Pulmonary embolism    COLLEEN (obstructive sleep apnea)  as per pt. he tried cpap/ bipap in the past did not like it so never used after that.    Thrombocytopenia    History of hypotension    H/O bone marrow transplant            ======================= LINES/TUBES  =====================  [place]: (Date placed)  Central Line: (Date placed)  HD Catheter: (Date placed)  Arterial Line: (Date placed)  Endotracheal Tube: (Date placed)  [place]: (Date placed)  ======================= DISPOSITION  =====================  [X] Critical   [ ] Guarded    [ ] Stable    [X] Maintain in CICU  [ ] Downgrade to Telemtry  [ ] Discharge Home    Patient requires continuous monitoring with bedside rhythm monitoring, pulse ox monitoring, and intermittent blood gas analysis. Care plan discussed with ICU care team. Patient remained critical and at risk for life threatening decompensation.  Patient seen, examined and plan discussed with CCU team during rounds.     I have personally provided 30 minutes of critical care time excluding time spent on separate procedures, in addition to initial critical care time provided by the CICU Attending, Dr. Cooper/ Madelyn/ Hank/ Jamin/ Oanh/ Prosper .       Luiza Zepeda [facility] [phone]

## 2021-10-07 NOTE — PROVIDER CONTACT NOTE (OTHER) - ASSESSMENT
Stood patient up at bedside to encourage voiding, pt. unable to void and states "It will come, it will come, I usually only pee in the morning." Pt. informed that he will need to be straight catheterized and pt. refused. Pt. states "I don't want a tube in me" Stood patient up at bedside to encourage voiding, pt. unable to void; only passing gas and states "It will come, it will come, I usually only pee in the morning." Pt. informed that he will need to be straight catheterized and pt. refused. Pt. states "I don't want a tube in me, just wait, I'll urinate soon" Pt. denies bladder fullness, abdomen feels firm to touch.

## 2021-10-08 NOTE — CONSULT NOTE ADULT - ATTENDING COMMENTS
62 yo M PMH stage C-D chronic NICM w/ EF <20%, factor V mutation w/ h/o DVT/PE, osteomyelitis w/ R toe amputation, prior aspergillosis and CMV, thrombocytopenia, prior tobacco use, COLLEEN not on CPAP, presenting for elective right heart cath admission. he has failed bone marrow transplant for his leukemia and remains transfusion dependent.  Noted to have large left pleural effusion.  Currently on face mask, on IV dobutamine and pressors, receiving blood transfusion.  By echo has LV and RV dysfunction.  Etiology of effusion is likely related to heart failure- was present in August. It appears much larger now and is likely contributing to hypoxemia.  He is currently in shock clinically, on pressors and inotropes and not stable for thoracentesis at this time.  Would add antibacterial therapy for possible sepsis.  Will follow with you and consider thoracentesis when he improves from the standpoint of his shock.
62 y/o male with EXTENSIVE pmh remarkable for chronic systolic HF ACC/AHA stage D, presumed NICMP LVEF <20% LVIDd 7cm, AML s/p SCT '19, factor V mutation with h/o DVT/PE, osteomyelitis with R toe amputation, h/o aspergillosis/CMV viremia, thrombocytopenia, former smoker, etoh use and COLLEEN not on CPAP who reports significant worsening in functional class associated to uptitration of diuretics and downtitration of GDMT due to low BP. Has clear clinical evidence of advanced disease and I recommended RHC. He came for the procedure yesterday but it was postponed due to acute anemia with Hg 6.6 g/dl.  He received 2u PRBCs and plan is for PA catheter placement today.   I suspect he is on low output state and will need inotropic assistance. If that's the case will transfer to CCU.   Unfortunately, he is not a candidate for heart tx or LVAD due to multiple comorbidities, active etoh use and he does not seems interested in aggressive treatments. Can consider palliative inotropes however his h/o prior multiple infections is concerning. I have discussed his case with Diane Guillaume and Lizz. The goal will be to optimize volume status with temporary inotropic assistance. Mr. Delong would prefer to go home OFF inotropes, if possible.

## 2021-10-08 NOTE — CONSULT NOTE ADULT - PROBLEM SELECTOR RECOMMENDATION 5
H/o Aspergillosis and 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  - On voriconazole/Acyclovir  - H/o pancytopenia/neutropenia while on chemo
- d/w patient's 2 sisters and significant other  - they state being aware of his clinical decline, that he may not leave the hospital, and mentioned his children are flying from [place]-of-state to see him  - significant other states having discussed GOC with CCU attending, and that they are not planning for further blood draws; attempted to discuss further transfusions if needed, but she was not amenable to detailed conversation today, noting she had this conversation previously as noted  - patient is DNR

## 2021-10-08 NOTE — PROGRESS NOTE ADULT - ASSESSMENT
A/P: 61M Hx remarkable for chronic systolic HF ACC/AHA stage C-D, presumed NICMP LVEF <20% LVIDd 7cm, AML s/p SCT '19, factor V mutation with h/o DVT/PE, osteomyelitis with R toe amputation, h/o aspergillosis/CMV viremia, thrombocytopenia, former smoker and COLLEEN not on CPAP who presented for elective RHC. Admission labs remarkable for anemia in spite of h/o 2u PRBC transfusions a few weeks PTA, so was admitted to medicine team for anemia. Overall, poor prognosis and GOC discussed with him and his significant other. Patient was disengaged in the conversation, but his SO stated that he had a prior DNR/DNI during his last hospitalization. Will have palliative c/s since he is not a candidate for advanced therapies due to multiple comorbidities, active etoh use and he does not seem interested in aggressive treatments per prior conversations.     Events noted during today GOC - Patients family two sisters, brothers-in-law and pt's HCP Ashlyn present at the bedside.  We had several discussions throughout the day including but not limited to me explaining clinical condition and rapid deterioration.  Pt was made DNR/DNI.  Decision was made to cap pressors, Lasix 2.5 mg/hr continues, abx were given.  No escalation of care including no lab checks.  Ashlyn wished for comfort and symptom control, pt's family asked if this can be an issue and I explained that it may cause lowered BP.  They did not want to hasten death as pt's children are coming to see him.  We all agreed that at this period of time we will hold of on pain meds, etc, but if he gets worse and more uncomfortable further discussions will take place.      - Pressors capped, no escalation of care including no lab draws   - DNR/DNI, appears comfortable at this time - f/u Palliative in AM  ======================= DISPOSITION  =====================  [X] Critical   [ ] Guarded    [ ] Stable    [X] Maintain in CICU  [ ] Downgrade to Telemetry  [ ] Discharge Home    Patient requires continuous monitoring with bedside rhythm monitoring, pulse ox monitoring, and intermittent blood gas analysis. Care plan discussed with ICU care team. Patient remained critical and at risk for life threatening decompensation.  Patient seen, examined and plan discussed with CCU team during rounds.     I have personally provided 30 minutes of critical care time excluding time spent on separate procedures, in addition to initial critical care time provided by the CICU Attending, Dr. Cooper/ Madelyn/ Hank/ Jamin/ Oanh/ Prosper .       Luiza Zepeda [facility] [phone]

## 2021-10-08 NOTE — PROVIDER CONTACT NOTE (EICU) - ASSESSMENT
pt found to be increasingly agitated and intermittently disorientated to place & situation, tachycardiac to the 115s-120s, tachypneic in the 30s O2 sat 92% on 2L n/c, complaining of chills and a headache, and is on a lasix gtt that refusing bladder scans and straight cath/ saucedo catheter

## 2021-10-08 NOTE — PROVIDER CONTACT NOTE (EICU) - SITUATION
pt admitted with chemo induced cardiomyopathy tachycardiac, confused, tachypneic, complaining of chills,  on a lasix drip, not urinating and refusing saucedo

## 2021-10-08 NOTE — PROVIDER CONTACT NOTE (EICU) - ACTION/TREATMENT ORDERED:
pt agreed to saucedo catheter after speaking with providers, IV tylenol given for headache and discomfort, BIPAP started at 30% for tachypnea and increase work of breathing.

## 2021-10-08 NOTE — PROGRESS NOTE ADULT - SUBJECTIVE AND OBJECTIVE BOX
CICU DAY NOTE  Admission date: 10/6/21  Chief complaint/ Diagnosis: ADHF   HPI: 62 y/o male with EXTENSIVE pmh remarkable for chronic systolic HF ACC/AHA stage C-D, presumed NICMP LVEF <20% LVIDd 7cm, AML s/p SCT '19, factor V mutation with h/o DVT/PE, osteomyelitis with R toe amputation, h/o aspergillosis/CMV viremia, thrombocytopenia, former smoker and COLLEEN not on CPAP who presented for elective RHC. Admission labs remarkable for anemia in spite of h/o 2u PRBC transfusions a few weeks PTA, so was admitted to medicine team for anemia.     Interval history  @5 and lasix gtt @10  Hemodynamics mixed 38 in the lab  off lasix gtt @3am  4AM mixed 62 <64, LA 0.9 CO/CI 8.3/4   dropped @2.5 @6am  Received the pt on  @2.5 Levo@0.13 vaso@0.04  10/8 8AM CVP 12, PA 20/12 mixed 34.1 LA 2.1 CO/CI SVR 4.6/2.2 SVR  increased @5     REVIEW OF SYSTEMS  Denies CP, Palpitation, SOB, Dyspnea    MEDICATIONS  (STANDING)  acyclovir   Oral Tab/Cap 400 milliGRAM(s) Oral two times a day  chlorhexidine 2% Cloths 1 Application(s) Topical daily  DOBUTamine Infusion 5 MICROgram(s)/kG/Min (12.3 mL/Hr) IV Continuous <Continuous>  finasteride 5 milliGRAM(s) Oral daily  folic acid 1 milliGRAM(s) Oral daily  influenza   Vaccine 0.5 milliLiter(s) IntraMuscular once  multivitamin 1 Tablet(s) Oral daily  norepinephrine Infusion 0.05 MICROgram(s)/kG/Min (7.69 mL/Hr) IV Continuous <Continuous>  pantoprazole    Tablet 40 milliGRAM(s) Oral before breakfast  sertraline 50 milliGRAM(s) Oral daily  tamsulosin 0.4 milliGRAM(s) Oral at bedtime  vasopressin Infusion 0.04 Unit(s)/Min (2.4 mL/Hr) IV Continuous <Continuous>  voriconazole 200 milliGRAM(s) Oral every 12 hours    MEDICATIONS  (PRN):  acetaminophen   Tablet .. 650 milliGRAM(s) Oral every 6 hours PRN Temp greater or equal to 38C (100.4F), Mild Pain (1 - 3)  ALPRAZolam 0.5 milliGRAM(s) Oral at bedtime PRN Anxiety  diphenhydrAMINE 25 milliGRAM(s) Oral every 4 hours PRN Rash and/or Itching      PAST MEDICAL & SURGICAL HISTORY:  Factor 5 Leiden mutation, heterozygous  H/O cardiomyopathy  Deep vein thrombosis (DVT)  Pulmonary embolism  COLLEEN (obstructive sleep apnea)  Thrombocytopenia  History of hypotension  H/O bone marrow transplant    FAMILY HISTORY:  FH: heart attack  father, mother, and brother    Allergy   No Known Allergies    ICU Vital Signs Last 24 Hrs  T(C): 36.1 (Max: 37.2)  HR: 89 (81 - 121)  BP: 95/64  (77/52 - 108/58)  BP(mean): 75(60 - 77)  ABP: 90/54  (76/45 - 95/58)  ABP(mean): 68  (56 - 72)  RR: 31 (15 - 32)  SpO2: 100%(74% - 100%)    I&O's Summary  IN: 896.4 mL / OUT: 2405 mL([id number]/hr) / NET: -1508.6 mL  s/p Padilla due to urinary retention initially put out 700+400ml x 2hrs     PHYSICAL EXAM  Appearance: Normal, NAD  HEAD:   Normocephalic  EYES: PERRLA, conjunctiva and sclera clear  NECK: Supple, No JVD  CHEST/LUNG: Clear to auscultation bilaterally; No wheezing  HEART: Normal S1, S2. No murmurs, rubs, or gallops  ABDOMEN: + Bowel sounds, Soft, NT, ND   EXTREMITIES:  2+ Peripheral Pulses, No clubbing, cyanosis, or edema  NEUROLOGY: non-focal, aaox1  SKIN: No rashes or lesions    Interpretation of Telemetry: sr  10/6 s/p 2 units of PRBC for H/H 6.2/18.9-> 8.4/25                               7.0  <7.6  4.93<3.79  )-----------( 41 <47                               20.2     139  |  104  |  22  ----------------------------<  120<H>  4.2   |  22  |  1.10<1.02<1.09    Ca    8.4    Phos  3.0     Mg     2.2      TPro  4.5<L>  /  Alb  3.1<L>  /  TBili  1.3<H>  /  DBili  x   /  AST  33  /  ALT  43  /  AlkPhos  208<H>    ABG - ( 08 Oct 2021 07:54 )  pH, Arterial: 7.47   /  pCO2: 31    /  pO2: 93    / HCO3: 23    / Base Excess: -0.8  /  SaO2: 98.6                 CICU DAY NOTE  Admission date: 10/6/21  Chief complaint/ Diagnosis: ADHF   HPI: 62 y/o male with EXTENSIVE pmh remarkable for chronic systolic HF ACC/AHA stage C-D, presumed NICMP LVEF <20% LVIDd 7cm, AML s/p SCT '19, factor V mutation with h/o DVT/PE, osteomyelitis with R toe amputation, h/o aspergillosis/CMV viremia, thrombocytopenia, former smoker and COLLEEN not on CPAP who presented for elective RHC. Admission labs remarkable for anemia in spite of h/o 2u PRBC transfusions a few weeks PTA, so was admitted to medicine team for anemia.     Interval history  @5 and lasix gtt @10  Hemodynamics mixed 38 in the lab  off lasix gtt @3am  4AM mixed 62 <64, LA 0.9 CO/CI 8.3/4   dropped @2.5 @6am  Received the pt on  @2.5 Levo@0.13 vaso@0.04  10/8 8AM CVP 12, PA 20/12 mixed 34.1 LA 2.1 CO/CI 4.6/2.2   increased @5     REVIEW OF SYSTEMS  Denies CP, Palpitation, SOB, Dyspnea    MEDICATIONS  (STANDING)  acyclovir   Oral Tab/Cap 400 milliGRAM(s) Oral two times a day  chlorhexidine 2% Cloths 1 Application(s) Topical daily  DOBUTamine Infusion 5 MICROgram(s)/kG/Min (12.3 mL/Hr) IV Continuous <Continuous>  finasteride 5 milliGRAM(s) Oral daily  folic acid 1 milliGRAM(s) Oral daily  influenza   Vaccine 0.5 milliLiter(s) IntraMuscular once  multivitamin 1 Tablet(s) Oral daily  norepinephrine Infusion 0.05 MICROgram(s)/kG/Min (7.69 mL/Hr) IV Continuous <Continuous>  pantoprazole    Tablet 40 milliGRAM(s) Oral before breakfast  sertraline 50 milliGRAM(s) Oral daily  tamsulosin 0.4 milliGRAM(s) Oral at bedtime  vasopressin Infusion 0.04 Unit(s)/Min (2.4 mL/Hr) IV Continuous <Continuous>  voriconazole 200 milliGRAM(s) Oral every 12 hours    MEDICATIONS  (PRN):  acetaminophen   Tablet .. 650 milliGRAM(s) Oral every 6 hours PRN Temp greater or equal to 38C (100.4F), Mild Pain (1 - 3)  ALPRAZolam 0.5 milliGRAM(s) Oral at bedtime PRN Anxiety  diphenhydrAMINE 25 milliGRAM(s) Oral every 4 hours PRN Rash and/or Itching      PAST MEDICAL & SURGICAL HISTORY:  Factor 5 Leiden mutation, heterozygous  H/O cardiomyopathy  Deep vein thrombosis (DVT)  Pulmonary embolism  COLLEEN (obstructive sleep apnea)  Thrombocytopenia  History of hypotension  H/O bone marrow transplant    FAMILY HISTORY:  FH: heart attack  father, mother, and brother    Allergy   No Known Allergies    ICU Vital Signs Last 24 Hrs  T(C): 36.1 (Max: 37.2)  HR: 89 (81 - 121)  BP: 95/64  (77/52 - 108/58)  BP(mean): 75(60 - 77)  ABP: 90/54  (76/45 - 95/58)  ABP(mean): 68  (56 - 72)  RR: 31 (15 - 32)  SpO2: 100%(74% - 100%)    I&O's Summary  IN: 896.4 mL / OUT: 2405 mL([id number]/hr) / NET: -1508.6 mL  s/p Padilla due to urinary retention initially put out 700+400ml x 2hrs     PHYSICAL EXAM  Appearance: Normal, NAD  HEAD:   Normocephalic  EYES: PERRLA, conjunctiva and sclera clear  NECK: Supple, No JVD  CHEST/LUNG: Clear to auscultation bilaterally; No wheezing  HEART: Normal S1, S2. No murmurs, rubs, or gallops  ABDOMEN: + Bowel sounds, Soft, NT, ND   EXTREMITIES:  2+ Peripheral Pulses, No clubbing, cyanosis, or edema  NEUROLOGY: non-focal, aaox1  SKIN: No rashes or lesions    Interpretation of Telemetry: sr  10/6 s/p 2 units of PRBC for H/H 6.2/18.9-> 8.4/25                               7.0  <7.6  4.93<3.79  )-----------( 41 <47                               20.2     139  |  104  |  22  ----------------------------<  120<H>  4.2   |  22  |  1.10<1.02<1.09    Ca    8.4    Phos  3.0     Mg     2.2      TPro  4.5<L>  /  Alb  3.1<L>  /  TBili  1.3<H>  /  DBili  x   /  AST  33  /  ALT  43  /  AlkPhos  208<H>    ABG - ( 08 Oct 2021 07:54 )  pH, Arterial: 7.47   /  pCO2: 31    /  pO2: 93    / HCO3: 23    / Base Excess: -0.8  /  SaO2: 98.6

## 2021-10-08 NOTE — PROGRESS NOTE ADULT - ATTENDING COMMENTS
60 yo man with AML s/p BMT 2019, HFrEF, p/w for elective RHC found to have significant anemia s/p blood transfusion and RHC yesterday which showed cardiogenic shock, requiring initiation of inotropic support.  Overnight deteriorated requiring pressor initiation and was weaned off Dobutamine.    In am found to be in worsened cardiogenic shock, Dobutamine restarted and pressors continued.  Lasix re-started as well given CVP 12-15.  PA sat 30s.  Increasing lactate.    Large left sided pleural effusion unable to tap given worsened hemo and low plts   Possible vasoplegia/sepsis as well given immunocompromised stated and broad spectrum abx given   Worsened anemia s/p tx recently, hemolysis labs ordered   Pt's family two sisters, brothers-in-law and pt's HCP Ashlyn present at the bedside.  We had several discussions throughout the day including but not limited to me explaining clinical condition and rapid deterioration.  Pt was made DNR/DNI.  Decision was made to cap pressors, Lasix 2.5 mg/hr continues, abx were given.  No escalation of care including no lab checks.  Ashlyn wished for comfort and symptom control, pt's family asked if this can be an issue and I explained that it may cause lowered BP.  They did not want to hasten death as pt's children are coming to see him.  We all agreed that at this period of time we will hold of on pain meds, etc, but if he gets worse and more uncomfortable further discussions will take place.

## 2021-10-08 NOTE — CONSULT NOTE ADULT - PROBLEM SELECTOR RECOMMENDATION 2
- transfusion dependent  - c/w PRN PRBC transfusions for Hgb <8
- d/w decision-maker significant other, does not appear they they want further pRBC transfusions (see below)  - consider dilaudid 0.2mg IV Q2 PRN for dyspnea if family agreeable

## 2021-10-08 NOTE — PROGRESS NOTE ADULT - SUBJECTIVE AND OBJECTIVE BOX
Subjective/24 hour events:  - s/p RHC yesterday which showed elevated filling pressures and low cardiac output  - he was started on a Lasix gtt/dobutamine and moved to CICU  - overnight became agitated/disoriented, tachypneic, and tachycardic to the 120s; c/o chills, HA, and urinary retention  - placed on BiPAP briefly, now on Venti mask  - was also hypotensive and started on pressors  - receiving 1 unit PRBC (ordered in 1/2 units)    Medications:  acetaminophen   Tablet .. 650 milliGRAM(s) Oral every 6 hours PRN  acetaminophen  IVPB .. 1000 milliGRAM(s) IV Intermittent once  acyclovir   Oral Tab/Cap 400 milliGRAM(s) Oral two times a day  ALPRAZolam 0.5 milliGRAM(s) Oral at bedtime PRN  cefepime   IVPB      chlorhexidine 2% Cloths 1 Application(s) Topical daily  diphenhydrAMINE 25 milliGRAM(s) Oral every 4 hours PRN  DOBUTamine Infusion 5 MICROgram(s)/kG/Min IV Continuous <Continuous>  finasteride 5 milliGRAM(s) Oral daily  folic acid 1 milliGRAM(s) Oral daily  furosemide Infusion 2.5 mG/Hr IV Continuous <Continuous>  influenza   Vaccine 0.5 milliLiter(s) IntraMuscular once  multivitamin 1 Tablet(s) Oral daily  norepinephrine Infusion 0.05 MICROgram(s)/kG/Min IV Continuous <Continuous>  pantoprazole    Tablet 40 milliGRAM(s) Oral before breakfast  sertraline 50 milliGRAM(s) Oral daily  tamsulosin 0.4 milliGRAM(s) Oral at bedtime  vasopressin Infusion 0.04 Unit(s)/Min IV Continuous <Continuous>  voriconazole 200 milliGRAM(s) Oral every 12 hours      ICU Vital Signs Last 24 Hrs  T(C): 37, Max: 37.2 (10-08 @ 00:00)  HR: 110 (82 - 130)  BP: 102/56 (77/52 - 108/58)  BP(mean): 72 (60 - 77)  ABP: 90/51 (76/45 - 103/60)  ABP(mean): 64 (56 - 83)  RR: 57 (15 - 57)  SpO2: 92% (74% - 100%)    Weight in k.8 (10-08-21)  Weight in k.7 (10-07-21)      I&O's Summary Last 24 Hrs    IN: 896.4 mL / OUT: 2405 mL / NET: -1508.6 mL      Tele: ST 130s    Physical Exam:    General: Restless  Neck: JVP elevated.  Respiratory: Tachypneic, CTA  CV: Regular, tachycardic. Normal S1 and S2. No murmurs, rub, or gallops. Radial pulses normal.  Abdomen: Soft, non-distended, non-tender  Skin: No skin lesions  Extremities: No edema  Neurology: Non-focal, oriented to self      Labs:    ( 10-08-21 @ 04:47 )               7.0    4.93  )--------( 41                   20.2     ( 10-08-21 @ 09:45 )     138  |  101  |  23  ---------------------<  177  4.9  |  16  |  1.11    Ca 8.5  Phos 3.8  Mg 2.2    ( 10-08-21 @ 09:45 )  TPro  4.9  /  Alb  3.4  /  TBili  1.6  /  DBili  x   /  AST  37  /  ALT  47  /  AlkPhos  220  ( 10-08-21 @ 04:47 )  TPro  4.5  /  Alb  3.1  /  TBili  1.3  /  DBili  x   /  AST  33  /  ALT  43  /  AlkPhos  208    PTT/PT/INR - ( 10-08-21 @ 09:45 )  PTT: 30.3 sec / PT: 18.9 sec / INR: 1.61 ratio      Serum Pro-Brain Natriuretic Peptide: 65033 (10-08-21 @ 09:45)    Oxygen Saturation, Mixed: 28.4 (10-08-21 @ 11:40)  Oxygen Saturation, Mixed: 34.1 (10-08-21 @ 07:54)      ABG - ( 10-08-21 @ 11:40 )  pH: 7.36  / pCO2: 22    / pO2: 83    / HCO3: 12    / Base Excess: -11.6 / SaO2: 98.6     Lactate, Blood: 1.0 (10-08-21 @ 04:47)  Blood Gas Venous - Lactate: 1.0 (10-07-21 @ 20:41)    Lactate Dehydrogenase, Serum: 281 (10-08-21 @ 09:45)

## 2021-10-08 NOTE — CONSULT NOTE ADULT - SUBJECTIVE AND OBJECTIVE BOX
CHIEF COMPLAINT: Elective right heart cath    HPI:  62 yo M PMH stage C-D chronic NICM w/ EF <20%, factor V mutation w/ h/o DVT/PE, osteomyelitis w/ R toe amputation, prior aspergillosis and CMV, thrombocytopenia, prior tobacco use, COLLEEN not on CPAP, presenting for elective right heart cath admission.     PAST MEDICAL & SURGICAL HISTORY:  Factor 5 Leiden mutation, heterozygous    H/O cardiomyopathy    Deep vein thrombosis (DVT)    Pulmonary embolism    COLLEEN (obstructive sleep apnea)  as per pt. he tried cpap/ bipap in the past did not like it so never used after that.    Thrombocytopenia    History of hypotension    H/O bone marrow transplant        FAMILY HISTORY:  FH: heart attack  father, mother, and brother        SOCIAL HISTORY:  Smoking: [ ] Never Smoked [ ] Former Smoker (__ packs x ___ years) [ ] Current Smoker  (__ packs x ___ years)  Substance Use: [ ] Never Used [ ] Used ____  EtOH Use:  Marital Status: [ ] Single [ ]  [ ]  [ ]   Sexual History:   Occupation:  Recent Travel:  Country of Birth:  Advance Directives:    Allergies    No Known Allergies    Intolerances        HOME MEDICATIONS:  Home Medications:  acyclovir 400 mg oral tablet: 1 tab(s) orally 2 times a day (06 Oct 2021 14:07)  finasteride 5 mg oral tablet: 1 tab(s) orally once a day (06 Oct 2021 14:07)  folic acid 1 mg oral tablet: 1 tab(s) orally once a day (06 Oct 2021 14:07)  Metoprolol Succinate ER 25 mg oral tablet, extended release: 1 tab(s) orally once a day (06 Oct 2021 14:07)  midodrine 10 mg oral tablet: 1 tab(s) orally 3 times a day, As Needed  if sbp &lt; 95 (06 Oct 2021 14:07)  Multiple Vitamins oral tablet: 1 tab(s) orally once a day (06 Oct 2021 14:07)  Pepcid 20 mg oral tablet: 1 tab(s) orally 2 times a day (06 Oct 2021 14:07)  sertraline 50 mg oral tablet: 1 tab(s) orally once a day (06 Oct 2021 14:07)  tamsulosin 0.4 mg oral capsule: 1 cap(s) orally once a day (at bedtime) (06 Oct 2021 14:07)  torsemide 20 mg oral tablet: 2 tab(s) orally once a day (06 Oct 2021 14:07)  voriconazole 200 mg oral tablet: 1 tab(s) orally every 12 hours (06 Oct 2021 14:07)  ZyrTEC 10 mg oral tablet: 1 tab(s) orally once a day (06 Oct 2021 14:07)      REVIEW OF SYSTEMS:  Constitutional: [ ] negative [ ] fevers [ ] chills [ ] weight loss [ ] weight gain  HEENT: [ ] negative [ ] dry eyes [ ] eye irritation [ ] postnasal drip [ ] nasal congestion  CV: [ ] negative  [ ] chest pain [ ] orthopnea [ ] palpitations [ ] murmur  Resp: [ ] negative [ ] cough [ ] shortness of breath [ ] dyspnea [ ] wheezing [ ] sputum [ ] hemoptysis  GI: [ ] negative [ ] nausea [ ] vomiting [ ] diarrhea [ ] constipation [ ] abd pain [ ] dysphagia   : [ ] negative [ ] dysuria [ ] nocturia [ ] hematuria [ ] increased urinary frequency  Musculoskeletal: [ ] negative [ ] back pain [ ] myalgias [ ] arthralgias [ ] fracture  Skin: [ ] negative [ ] rash [ ] itch  Neurological: [ ] negative [ ] headache [ ] dizziness [ ] syncope [ ] weakness [ ] numbness  Psychiatric: [ ] negative [ ] anxiety [ ] depression  Endocrine: [ ] negative [ ] diabetes [ ] thyroid problem  Hematologic/Lymphatic: [ ] negative [ ] anemia [ ] bleeding problem  Allergic/Immunologic: [ ] negative [ ] itchy eyes [ ] nasal discharge [ ] hives [ ] angioedema  [ ] All other systems negative  [ ] Unable to assess ROS because ________    OBJECTIVE:  ICU Vital Signs Last 24 Hrs  T(C): 36.1 (08 Oct 2021 08:00), Max: 37.2 (08 Oct 2021 00:00)  T(F): 97 (08 Oct 2021 08:00), Max: 99 (08 Oct 2021 00:00)  HR: 130 (08 Oct 2021 11:00) (82 - 130)  BP: 95/64 (08 Oct 2021 05:00) (77/52 - 108/58)  BP(mean): 75 (08 Oct 2021 05:00) (60 - 77)  ABP: 97/57 (08 Oct 2021 11:00) (76/45 - 103/60)  ABP(mean): 69 (08 Oct 2021 11:00) (56 - 83)  RR: 4 (08 Oct 2021 11:00) (4 - 46)  SpO2: 84% (08 Oct 2021 11:00) (74% - 100%)        10-07 @ 07:01  -  10-08 @ 07:00  --------------------------------------------------------  IN: 896.4 mL / OUT: 2405 mL / NET: -1508.6 mL    10-08 @ :  -  10-08 @ 12:22  --------------------------------------------------------  IN: 233.7 mL / OUT: 90 mL / NET: 143.7 mL      CAPILLARY BLOOD GLUCOSE          PHYSICAL EXAM:  General:   HEENT:   Lymph Nodes:  Neck:   Respiratory:   Cardiovascular:   Abdomen:   Extremities:   Skin:   Neurological:  Psychiatry:    HOSPITAL MEDICATIONS:  Standing Meds:  acyclovir   Oral Tab/Cap 400 milliGRAM(s) Oral two times a day  chlorhexidine 2% Cloths 1 Application(s) Topical daily  DOBUTamine Infusion 5 MICROgram(s)/kG/Min IV Continuous <Continuous>  finasteride 5 milliGRAM(s) Oral daily  folic acid 1 milliGRAM(s) Oral daily  furosemide Infusion 2.5 mG/Hr IV Continuous <Continuous>  influenza   Vaccine 0.5 milliLiter(s) IntraMuscular once  multivitamin 1 Tablet(s) Oral daily  norepinephrine Infusion 0.05 MICROgram(s)/kG/Min IV Continuous <Continuous>  pantoprazole    Tablet 40 milliGRAM(s) Oral before breakfast  sertraline 50 milliGRAM(s) Oral daily  tamsulosin 0.4 milliGRAM(s) Oral at bedtime  vasopressin Infusion 0.04 Unit(s)/Min IV Continuous <Continuous>  voriconazole 200 milliGRAM(s) Oral every 12 hours      PRN Meds:  acetaminophen   Tablet .. 650 milliGRAM(s) Oral every 6 hours PRN  ALPRAZolam 0.5 milliGRAM(s) Oral at bedtime PRN  diphenhydrAMINE 25 milliGRAM(s) Oral every 4 hours PRN      LABS:                        7.0    4.93  )-----------( 41       ( 08 Oct 2021 04:47 )             20.2     Hgb Trend: 7.0<--, 7.6<--, 8.4<--, 6.2<--, 6.6<--  10-08    138  |  101  |  23  ----------------------------<  177<H>  4.9   |  16<L>  |  1.11    Ca    8.5      08 Oct 2021 09:45  Phos  3.8     10-08  Mg     2.2     10-08    TPro  4.9<L>  /  Alb  3.4  /  TBili  1.6<H>  /  DBili  x   /  AST  37  /  ALT  47<H>  /  AlkPhos  220<H>  10-08    Creatinine Trend: 1.11<--, 1.10<--, 1.02<--, 1.09<--, 0.96<--  PT/INR - ( 08 Oct 2021 09:45 )   PT: 18.9 sec;   INR: 1.61 ratio         PTT - ( 08 Oct 2021 09:45 )  PTT:30.3 sec  Urinalysis Basic - ( 08 Oct 2021 04:47 )    Color: Yellow / Appearance: Clear / S.013 / pH: x  Gluc: x / Ketone: Negative  / Bili: Negative / Urobili: 2 mg/dL   Blood: x / Protein: Negative / Nitrite: Negative   Leuk Esterase: Negative / RBC: x / WBC x   Sq Epi: x / Non Sq Epi: x / Bacteria: x      Arterial Blood Gas:  10-08 @ 11:40  7.36/22/83/12/98.6/-11.6  ABG lactate: --  Arterial Blood Gas:  10-08 @ 09:35  7.40/28/83/17/97.6/-6.6  ABG lactate: --  Arterial Blood Gas:  10-08 @ 07:54  7.47/31/93/23/98.6/-0.8  ABG lactate: --  Arterial Blood Gas:  10-08 @ 04:43  7.42/39/100/25/99.3/0.8  ABG lactate: --    Venous Blood Gas:  10-07 @ 20:41  --/--/--/--/--  VBG Lactate: 1.0  Venous Blood Gas:  10-07 @ 16:32  --/--/--/--/--  VBG Lactate: 0.9      MICROBIOLOGY:       RADIOLOGY:  [ ] Reviewed and interpreted by me    PULMONARY FUNCTION TESTS:    EKG:   CHIEF COMPLAINT: Elective right heart cath    HPI:  60 yo M PMH stage C-D chronic NICM w/ EF <20%, factor V mutation w/ h/o DVT/PE, osteomyelitis w/ R toe amputation, prior aspergillosis and CMV, thrombocytopenia, prior tobacco use, COLLEEN not on CPAP, presenting for elective right heart cath admission. Followed by cardiology outpatient for severe heart failure and planned for RHC to assist in management of diuresis. Of note, was recently admitted to [facility] for falls and started on midodrine. Upon arrival noted to be with anemia to 6s (baseline 9-10 prior). Was admitted to medicine for management of anemia. Underwent transfusion and ultimately RHC performed 10/7 with observed RA 20, RV 43/11, PA 43/22, PCWP26, PA sat 38. Started on dobutamine and transferred to CCU for cardiogenic shock. With intermittent hypoxemia during admission requiring 2LNC. Post cath with brief NIPPV overnight. CXR w/ e/o of pulmonary effusion confirmed by ultrasound for which pulmonary was consulted.     PAST MEDICAL & SURGICAL HISTORY:  Factor 5 Leiden mutation, heterozygous    H/O cardiomyopathy    Deep vein thrombosis (DVT)    Pulmonary embolism    COLLEEN (obstructive sleep apnea)  as per pt. he tried cpap/ bipap in the past did not like it so never used after that.    Thrombocytopenia    History of hypotension    H/O bone marrow transplant        FAMILY HISTORY:  FH: heart attack  father, mother, and brother        SOCIAL HISTORY:  Smoking: [x] Never Smoked [ ] Former Smoker (__ packs x ___ years) [ ] Current Smoker  (__ packs x ___ years)  Substance Use: [x] Never Used [ ] Used ____  EtOH Use: Denies  Marital Status: [ ] Single [ ]  [ ]  [ ]   Sexual History:   Occupation:  Recent Travel:  Country of Birth:  Advance Directives:    Allergies    No Known Allergies    Intolerances        HOME MEDICATIONS:  Home Medications:  acyclovir 400 mg oral tablet: 1 tab(s) orally 2 times a day (06 Oct 2021 14:07)  finasteride 5 mg oral tablet: 1 tab(s) orally once a day (06 Oct 2021 14:07)  folic acid 1 mg oral tablet: 1 tab(s) orally once a day (06 Oct 2021 14:07)  Metoprolol Succinate ER 25 mg oral tablet, extended release: 1 tab(s) orally once a day (06 Oct 2021 14:07)  midodrine 10 mg oral tablet: 1 tab(s) orally 3 times a day, As Needed  if sbp &lt; 95 (06 Oct 2021 14:07)  Multiple Vitamins oral tablet: 1 tab(s) orally once a day (06 Oct 2021 14:07)  Pepcid 20 mg oral tablet: 1 tab(s) orally 2 times a day (06 Oct 2021 14:07)  sertraline 50 mg oral tablet: 1 tab(s) orally once a day (06 Oct 2021 14:07)  tamsulosin 0.4 mg oral capsule: 1 cap(s) orally once a day (at bedtime) (06 Oct 2021 14:07)  torsemide 20 mg oral tablet: 2 tab(s) orally once a day (06 Oct 2021 14:07)  voriconazole 200 mg oral tablet: 1 tab(s) orally every 12 hours (06 Oct 2021 14:07)  ZyrTEC 10 mg oral tablet: 1 tab(s) orally once a day (06 Oct 2021 14:07)      REVIEW OF SYSTEMS:  Constitutional: [ ] negative [ ] fevers [ ] chills [ ] weight loss [ ] weight gain  HEENT: [ ] negative [ ] dry eyes [ ] eye irritation [ ] postnasal drip [ ] nasal congestion  CV: [ ] negative  [ ] chest pain [ ] orthopnea [ ] palpitations [ ] murmur  Resp: [ ] negative [ ] cough [x] shortness of breath [ ] dyspnea [ ] wheezing [ ] sputum [ ] hemoptysis  GI: [ ] negative [ ] nausea [ ] vomiting [ ] diarrhea [ ] constipation [ ] abd pain [ ] dysphagia   : [ ] negative [ ] dysuria [ ] nocturia [ ] hematuria [ ] increased urinary frequency  Musculoskeletal: [ ] negative [ ] back pain [ ] myalgias [ ] arthralgias [ ] fracture  Skin: [ ] negative [ ] rash [ ] itch  Neurological: [ ] negative [ ] headache [ ] dizziness [ ] syncope [ ] weakness [ ] numbness  Psychiatric: [ ] negative [ ] anxiety [ ] depression  Endocrine: [ ] negative [ ] diabetes [ ] thyroid problem  Hematologic/Lymphatic: [ ] negative [ ] anemia [ ] bleeding problem  Allergic/Immunologic: [ ] negative [ ] itchy eyes [ ] nasal discharge [ ] hives [ ] angioedema  [x] All other systems negative  [ ] Unable to assess ROS because ________    OBJECTIVE:  ICU Vital Signs Last 24 Hrs  T(C): 36.1 (08 Oct 2021 08:00), Max: 37.2 (08 Oct 2021 00:00)  T(F): 97 (08 Oct 2021 08:00), Max: 99 (08 Oct 2021 00:00)  HR: 130 (08 Oct 2021 11:00) (82 - 130)  BP: 95/64 (08 Oct 2021 05:00) (77/52 - 108/58)  BP(mean): 75 (08 Oct 2021 05:00) (60 - 77)  ABP: 97/57 (08 Oct 2021 11:00) (76/45 - 103/60)  ABP(mean): 69 (08 Oct 2021 11:00) (56 - 83)  RR: 4 (08 Oct 2021 11:00) (4 - 46)  SpO2: 84% (08 Oct 2021 11:00) (74% - 100%)        10-07 @ :01  -  10-08 @ 07:00  --------------------------------------------------------  IN: 896.4 mL / OUT: 2405 mL / NET: -1508.6 mL    10-08 @ :01  -  10-08 @ 12:22  --------------------------------------------------------  IN: 233.7 mL / OUT: 90 mL / NET: 143.7 mL      CAPILLARY BLOOD GLUCOSE          PHYSICAL EXAM:  General:   HEENT:   Lymph Nodes:  Neck:   Respiratory:   Cardiovascular:   Abdomen:   Extremities:   Skin:   Neurological:  Psychiatry:    HOSPITAL MEDICATIONS:  Standing Meds:  acyclovir   Oral Tab/Cap 400 milliGRAM(s) Oral two times a day  chlorhexidine 2% Cloths 1 Application(s) Topical daily  DOBUTamine Infusion 5 MICROgram(s)/kG/Min IV Continuous <Continuous>  finasteride 5 milliGRAM(s) Oral daily  folic acid 1 milliGRAM(s) Oral daily  furosemide Infusion 2.5 mG/Hr IV Continuous <Continuous>  influenza   Vaccine 0.5 milliLiter(s) IntraMuscular once  multivitamin 1 Tablet(s) Oral daily  norepinephrine Infusion 0.05 MICROgram(s)/kG/Min IV Continuous <Continuous>  pantoprazole    Tablet 40 milliGRAM(s) Oral before breakfast  sertraline 50 milliGRAM(s) Oral daily  tamsulosin 0.4 milliGRAM(s) Oral at bedtime  vasopressin Infusion 0.04 Unit(s)/Min IV Continuous <Continuous>  voriconazole 200 milliGRAM(s) Oral every 12 hours      PRN Meds:  acetaminophen   Tablet .. 650 milliGRAM(s) Oral every 6 hours PRN  ALPRAZolam 0.5 milliGRAM(s) Oral at bedtime PRN  diphenhydrAMINE 25 milliGRAM(s) Oral every 4 hours PRN      LABS:                        7.0    4.93  )-----------( 41       ( 08 Oct 2021 04:47 )             20.2     Hgb Trend: 7.0<--, 7.6<--, 8.4<--, 6.2<--, 6.6<--  10-08    138  |  101  |  23  ----------------------------<  177<H>  4.9   |  16<L>  |  1.11    Ca    8.5      08 Oct 2021 09:45  Phos  3.8     10-  Mg     2.2     10-08    TPro  4.9<L>  /  Alb  3.4  /  TBili  1.6<H>  /  DBili  x   /  AST  37  /  ALT  47<H>  /  AlkPhos  220<H>  10    Creatinine Trend: 1.11<--, 1.10<--, 1.02<--, 1.09<--, 0.96<--  PT/INR - ( 08 Oct 2021 09:45 )   PT: 18.9 sec;   INR: 1.61 ratio         PTT - ( 08 Oct 2021 09:45 )  PTT:30.3 sec  Urinalysis Basic - ( 08 Oct 2021 04:47 )    Color: Yellow / Appearance: Clear / S.013 / pH: x  Gluc: x / Ketone: Negative  / Bili: Negative / Urobili: 2 mg/dL   Blood: x / Protein: Negative / Nitrite: Negative   Leuk Esterase: Negative / RBC: x / WBC x   Sq Epi: x / Non Sq Epi: x / Bacteria: x      Arterial Blood Gas:  10-08 @ 11:40  7.36/22/83/12/98.6/-11.6  ABG lactate: --  Arterial Blood Gas:  10-08 @ 09:35  7.40/28/83/17/97.6/-6.6  ABG lactate: --  Arterial Blood Gas:  10-08 @ 07:54  7.47/31/93/23/98.6/-0.8  ABG lactate: --  Arterial Blood Gas:  10-08 @ 04:43  7.42/39/100/25/99.3/0.8  ABG lactate: --    Venous Blood Gas:  10-07 @ 20:41  --/--/--/--/--  VBG Lactate: 1.0  Venous Blood Gas:  10-07 @ 16:32  --/--/--/--/--  VBG Lactate: 0.9      MICROBIOLOGY:       RADIOLOGY:  [ ] Reviewed and interpreted by me    PULMONARY FUNCTION TESTS:    EKG:   CHIEF COMPLAINT: Elective right heart cath    HPI:  60 yo M PMH stage C-D chronic NICM w/ EF <20%, factor V mutation w/ h/o DVT/PE, osteomyelitis w/ R toe amputation, prior aspergillosis and CMV, thrombocytopenia, prior tobacco use, COLLEEN not on CPAP, presenting for elective right heart cath admission. Followed by cardiology outpatient for severe heart failure and planned for RHC to assist in management of diuresis. Of note, was recently admitted to [facility] for falls and started on midodrine. Upon arrival noted to be with anemia to 6s (baseline 9-10 prior). Was admitted to medicine for management of anemia. Underwent transfusion and ultimately RHC performed 10/7 with observed RA 20, RV 43/11, PA 43/22, PCWP26, PA sat 38. Started on dobutamine and transferred to CCU for cardiogenic shock. With intermittent hypoxemia during admission requiring 2LNC. Post cath with brief NIPPV overnight. CXR w/ e/o of pulmonary effusion confirmed by ultrasound for which pulmonary was consulted.     PAST MEDICAL & SURGICAL HISTORY:  Factor 5 Leiden mutation, heterozygous    H/O cardiomyopathy    Deep vein thrombosis (DVT)    Pulmonary embolism    COLLEEN (obstructive sleep apnea)  as per pt. he tried cpap/ bipap in the past did not like it so never used after that.    Thrombocytopenia    History of hypotension    H/O bone marrow transplant        FAMILY HISTORY:  FH: heart attack  father, mother, and brother        SOCIAL HISTORY:  Smoking: [x] Never Smoked [ ] Former Smoker (__ packs x ___ years) [ ] Current Smoker  (__ packs x ___ years)  Substance Use: [x] Never Used [ ] Used ____  EtOH Use: Denies  Marital Status: [ ] Single [ ]  [ ]  [ ]   Sexual History:   Occupation:  Recent Travel:  Country of Birth:  Advance Directives:    Allergies    No Known Allergies    Intolerances        HOME MEDICATIONS:  Home Medications:  acyclovir 400 mg oral tablet: 1 tab(s) orally 2 times a day (06 Oct 2021 14:07)  finasteride 5 mg oral tablet: 1 tab(s) orally once a day (06 Oct 2021 14:07)  folic acid 1 mg oral tablet: 1 tab(s) orally once a day (06 Oct 2021 14:07)  Metoprolol Succinate ER 25 mg oral tablet, extended release: 1 tab(s) orally once a day (06 Oct 2021 14:07)  midodrine 10 mg oral tablet: 1 tab(s) orally 3 times a day, As Needed  if sbp &lt; 95 (06 Oct 2021 14:07)  Multiple Vitamins oral tablet: 1 tab(s) orally once a day (06 Oct 2021 14:07)  Pepcid 20 mg oral tablet: 1 tab(s) orally 2 times a day (06 Oct 2021 14:07)  sertraline 50 mg oral tablet: 1 tab(s) orally once a day (06 Oct 2021 14:07)  tamsulosin 0.4 mg oral capsule: 1 cap(s) orally once a day (at bedtime) (06 Oct 2021 14:07)  torsemide 20 mg oral tablet: 2 tab(s) orally once a day (06 Oct 2021 14:07)  voriconazole 200 mg oral tablet: 1 tab(s) orally every 12 hours (06 Oct 2021 14:07)  ZyrTEC 10 mg oral tablet: 1 tab(s) orally once a day (06 Oct 2021 14:07)      REVIEW OF SYSTEMS:  Constitutional: [ ] negative [ ] fevers [ ] chills [ ] weight loss [ ] weight gain  HEENT: [ ] negative [ ] dry eyes [ ] eye irritation [ ] postnasal drip [ ] nasal congestion  CV: [ ] negative  [ ] chest pain [ ] orthopnea [ ] palpitations [ ] murmur  Resp: [ ] negative [ ] cough [x] shortness of breath [ ] dyspnea [ ] wheezing [ ] sputum [ ] hemoptysis  GI: [ ] negative [ ] nausea [ ] vomiting [ ] diarrhea [ ] constipation [ ] abd pain [ ] dysphagia   : [ ] negative [ ] dysuria [ ] nocturia [ ] hematuria [ ] increased urinary frequency  Musculoskeletal: [ ] negative [ ] back pain [ ] myalgias [ ] arthralgias [ ] fracture  Skin: [ ] negative [ ] rash [ ] itch  Neurological: [ ] negative [ ] headache [ ] dizziness [ ] syncope [ ] weakness [ ] numbness  Psychiatric: [ ] negative [ ] anxiety [ ] depression  Endocrine: [ ] negative [ ] diabetes [ ] thyroid problem  Hematologic/Lymphatic: [ ] negative [ ] anemia [ ] bleeding problem  Allergic/Immunologic: [ ] negative [ ] itchy eyes [ ] nasal discharge [ ] hives [ ] angioedema  [x] All other systems negative  [ ] Unable to assess ROS because ________    OBJECTIVE:  ICU Vital Signs Last 24 Hrs  T(C): 36.1 (08 Oct 2021 08:00), Max: 37.2 (08 Oct 2021 00:00)  T(F): 97 (08 Oct 2021 08:00), Max: 99 (08 Oct 2021 00:00)  HR: 130 (08 Oct 2021 11:00) (82 - 130)  BP: 95/64 (08 Oct 2021 05:00) (77/52 - 108/58)  BP(mean): 75 (08 Oct 2021 05:00) (60 - 77)  ABP: 97/57 (08 Oct 2021 11:00) (76/45 - 103/60)  ABP(mean): 69 (08 Oct 2021 11:00) (56 - 83)  RR: 4 (08 Oct 2021 11:00) (4 - 46)  SpO2: 84% (08 Oct 2021 11:00) (74% - 100%)        10-07 @ :01  -  10-08 @ 07:00  --------------------------------------------------------  IN: 896.4 mL / OUT: 2405 mL / NET: -1508.6 mL    10-08 @ :01  -  10-08 @ 12:22  --------------------------------------------------------  IN: 233.7 mL / OUT: 90 mL / NET: 143.7 mL      CAPILLARY BLOOD GLUCOSE          PHYSICAL EXAM:  GEN: Male, uncomfortable  HEENT: EOMI  CV: Regular  PULM: Clear anteriorly  ABD: Soft  EXT: WWP, minimal edema    HOSPITAL MEDICATIONS:  Standing Meds:  acyclovir   Oral Tab/Cap 400 milliGRAM(s) Oral two times a day  chlorhexidine 2% Cloths 1 Application(s) Topical daily  DOBUTamine Infusion 5 MICROgram(s)/kG/Min IV Continuous <Continuous>  finasteride 5 milliGRAM(s) Oral daily  folic acid 1 milliGRAM(s) Oral daily  furosemide Infusion 2.5 mG/Hr IV Continuous <Continuous>  influenza   Vaccine 0.5 milliLiter(s) IntraMuscular once  multivitamin 1 Tablet(s) Oral daily  norepinephrine Infusion 0.05 MICROgram(s)/kG/Min IV Continuous <Continuous>  pantoprazole    Tablet 40 milliGRAM(s) Oral before breakfast  sertraline 50 milliGRAM(s) Oral daily  tamsulosin 0.4 milliGRAM(s) Oral at bedtime  vasopressin Infusion 0.04 Unit(s)/Min IV Continuous <Continuous>  voriconazole 200 milliGRAM(s) Oral every 12 hours      PRN Meds:  acetaminophen   Tablet .. 650 milliGRAM(s) Oral every 6 hours PRN  ALPRAZolam 0.5 milliGRAM(s) Oral at bedtime PRN  diphenhydrAMINE 25 milliGRAM(s) Oral every 4 hours PRN      LABS:                        7.0    4.93  )-----------( 41       ( 08 Oct 2021 04:47 )             20.2     Hgb Trend: 7.0<--, 7.6<--, 8.4<--, 6.2<--, 6.6<--  10-    138  |  101  |  23  ----------------------------<  177<H>  4.9   |  16<L>  |  1.11    Ca    8.5      08 Oct 2021 09:45  Phos  3.8     10  Mg     2.2     10-08    TPro  4.9<L>  /  Alb  3.4  /  TBili  1.6<H>  /  DBili  x   /  AST  37  /  ALT  47<H>  /  AlkPhos  220<H>  10    Creatinine Trend: 1.11<--, 1.10<--, 1.02<--, 1.09<--, 0.96<--  PT/INR - ( 08 Oct 2021 09:45 )   PT: 18.9 sec;   INR: 1.61 ratio         PTT - ( 08 Oct 2021 09:45 )  PTT:30.3 sec  Urinalysis Basic - ( 08 Oct 2021 04:47 )    Color: Yellow / Appearance: Clear / S.013 / pH: x  Gluc: x / Ketone: Negative  / Bili: Negative / Urobili: 2 mg/dL   Blood: x / Protein: Negative / Nitrite: Negative   Leuk Esterase: Negative / RBC: x / WBC x   Sq Epi: x / Non Sq Epi: x / Bacteria: x      Arterial Blood Gas:  10-08 @ 11:40  7.36/22/83/12/98.6/-11.6  ABG lactate: --  Arterial Blood Gas:  10-08 @ 09:35  7.40/28/83/17/97.6/-6.6  ABG lactate: --  Arterial Blood Gas:  10-08 @ 07:54  7.47/31/93/23/98.6/-0.8  ABG lactate: --  Arterial Blood Gas:  10-08 @ 04:43  7.42/39/100/25/99.3/0.8  ABG lactate: --    Venous Blood Gas:  10-07 @ 20:41  --/--/--/--/--  VBG Lactate: 1.0  Venous Blood Gas:  10-07 @ 16:32  --/--/--/--/--  VBG Lactate: 0.9      MICROBIOLOGY:       RADIOLOGY:  [ ] Reviewed and interpreted by me    PULMONARY FUNCTION TESTS:    EKG:

## 2021-10-08 NOTE — PROGRESS NOTE ADULT - SUBJECTIVE AND OBJECTIVE BOX
[facility]    Ede Zamudio, Riki, Edgar, Gem, Jose, Tabitha      [phone]    CHIEF COMPLAINT: Patient is a 61y old  Male who presents with a chief complaint of Right Heart Cath and Severe anemia (08 Oct 2021 08:32)      Follow Up: cardiogenic shock, bone marrow failure    Interim history: moved to icu for shock and trial of inotropes. agitated and confused overnight, with urinary retention    MEDICATIONS  (STANDING):  acyclovir   Oral Tab/Cap 400 milliGRAM(s) Oral two times a day  chlorhexidine 2% Cloths 1 Application(s) Topical daily  DOBUTamine Infusion 5 MICROgram(s)/kG/Min (12.3 mL/Hr) IV Continuous <Continuous>  finasteride 5 milliGRAM(s) Oral daily  folic acid 1 milliGRAM(s) Oral daily  furosemide   Injectable 40 milliGRAM(s) IV Push once  furosemide   Injectable 40 milliGRAM(s) IV Push once  influenza   Vaccine 0.5 milliLiter(s) IntraMuscular once  multivitamin 1 Tablet(s) Oral daily  norepinephrine Infusion 0.05 MICROgram(s)/kG/Min (7.69 mL/Hr) IV Continuous <Continuous>  pantoprazole    Tablet 40 milliGRAM(s) Oral before breakfast  sertraline 50 milliGRAM(s) Oral daily  tamsulosin 0.4 milliGRAM(s) Oral at bedtime  vasopressin Infusion 0.04 Unit(s)/Min (2.4 mL/Hr) IV Continuous <Continuous>  voriconazole 200 milliGRAM(s) Oral every 12 hours    MEDICATIONS  (PRN):  acetaminophen   Tablet .. 650 milliGRAM(s) Oral every 6 hours PRN Temp greater or equal to 38C (100.4F), Mild Pain (1 - 3)  ALPRAZolam 0.5 milliGRAM(s) Oral at bedtime PRN Anxiety  diphenhydrAMINE 25 milliGRAM(s) Oral every 4 hours PRN Rash and/or Itching      REVIEW OF SYSTEMS: unable    Vital Signs Last 24 Hrs  T(C): 36.1 (08 Oct 2021 08:00), Max: 37.2 (08 Oct 2021 00:00)  T(F): 97 (08 Oct 2021 08:00), Max: 99 (08 Oct 2021 00:00)  HR: 114 (08 Oct 2021 09:00) (81 - 121)  BP: 95/64 (08 Oct 2021 05:00) (77/52 - 108/58)  BP(mean): 75 (08 Oct 2021 05:00) (60 - 77)  RR: 36 (08 Oct 2021 08:45) (15 - 36)  SpO2: 97% (08 Oct 2021 08:45) (74% - 100%)    I&O's Summary    07 Oct 2021 07:01  -  08 Oct 2021 07:00  --------------------------------------------------------  IN: 896.4 mL / OUT: 2405 mL / NET: -1508.6 mL    08 Oct 2021 07:01  -  08 Oct 2021 09:28  --------------------------------------------------------  IN: 37.9 mL / OUT: 30 mL / NET: 7.9 mL        Telemetry past 24h: sr    PHYSICAL EXAM:    Constitutional: well-nourished, well-developed, NAD   HEENT:  MMM, sclerae anicteric, conjunctivae clear, no oral cyanosis.  Pulmonary: Non-labored, breath sounds are decreased left lung fields, No wheezing, rales or rhonchi  Cardiovascular: Regular, S1 and S2.  No murmur.  No rubs, gallops or clicks  Gastrointestinal: Bowel Sounds present, soft, nontender.   Lymph: 1+ peripheral edema.   Neurological: Alert, no focal deficits  Skin: No rashes.  Psych:  Mood & affect appropriate    LABS: All Labs Reviewed:                        7.0    4.93  )-----------( 41       ( 08 Oct 2021 04:47 )             20.2                         7.6    3.79  )-----------( 47       ( 07 Oct 2021 21:53 )             22.9                         8.4    4.74  )-----------( 64       ( 07 Oct 2021 06:27 )             25.0     08 Oct 2021 04:47    139    |  104    |  22     ----------------------------<  120    4.2     |  22     |  1.10   07 Oct 2021 21:01    139    |  102    |  22     ----------------------------<  119    3.7     |  23     |  1.02   07 Oct 2021 06:27    139    |  101    |  24     ----------------------------<  124    4.0     |  22     |  1.09     Ca    8.4        08 Oct 2021 04:47  Ca    8.6        07 Oct 2021 21:01  Ca    8.9        07 Oct 2021 06:27  Phos  3.0       08 Oct 2021 04:47  Phos  3.2       07 Oct 2021 21:01  Phos  3.8       07 Oct 2021 06:27  Mg     2.2       08 Oct 2021 04:47  Mg     1.9       07 Oct 2021 21:01  Mg     1.9       07 Oct 2021 06:27    TPro  4.5    /  Alb  3.1    /  TBili  1.3    /  DBili  x      /  AST  33     /  ALT  43     /  AlkPhos  208    08 Oct 2021 04:47  TPro  4.8    /  Alb  3.3    /  TBili  1.2    /  DBili  x      /  AST  40     /  ALT  48     /  AlkPhos  229    07 Oct 2021 21:01  TPro  5.4    /  Alb  3.7    /  TBili  1.4    /  DBili  x      /  AST  51     /  ALT  60     /  AlkPhos  259    07 Oct 2021 06:27          Blood Culture:         RADIOLOGY:    EKG:    Echo:

## 2021-10-08 NOTE — CONSULT NOTE ADULT - PROBLEM SELECTOR RECOMMENDATION 9
- Full consult to follow shortly. Please page [phone] with any acute concerns. - c/w lasix gtt  - c/w IV pressor  - monitor hemodynamics

## 2021-10-08 NOTE — PROGRESS NOTE ADULT - ASSESSMENT
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 Admission labs remarkable for anemia in spite of h/o 2u PRBC transfusions a few weeks PTA, so was admitted to medicine team for anemia.     #NEURO: Pt was agitated and confused overnight   currently on constant observation  Now aaox 2   - neuro assessment as per ICU protocol     # RESP: Complains of intermittent SOB and dyspnea   currently on NC 2L w/ Sao2   CXR/CT of chest confirmed large pleural effusion   - Oxygen supplement to maintain sao2 >92%  - Follow up Pulm rec: start empirical abx  - Serial ABG     # CARDIOVASCULAR   s/p elective RHC w/ low flow state  10/8 8AM CVP 12, PA 20/12 mixed 34.1 LA 2.1 CO/CI  4.6/2.2   - Cont. Inotrop gtt   - Hemodynamics w/ LA q6hrs  - Follow up w/ HF rec: Palliative care consult   Hypotension in setting of cardiogenic shock and vasophlegic status   - Cont. pressor support w/ levo and vaso gtt    # GI: DASH diet w/ fluid restriction  - Cont. PPI   - Nutritional supplement w/ folic acid/ MVI    # RENAL: baseline Cr <1.0  I&O's Summary  IN: 896.4 mL / OUT: 2405 mL([id number]/hr) / NET: -1508.6 mL  s/p Padilla due to urinary retention initially put out 700+400ml x 2hrs   currently CVP 12 w/ low PA  S/P lasix 40 IVP X 2 jessica-blood transfusion      60 y/o male with EXTENSIVE pmh remarkable for chronic systolic HF ACC/AHA stage C-D, presumed NICMP LVEF <20% LVIDd 7cm, AML s/p SCT '19, factor V mutation with h/o DVT/PE, osteomyelitis with R toe amputation, h/o aspergillosis/CMV viremia, thrombocytopenia, former smoker and COLLEEN not on CPAP who presented for elective RHC. Admission labs remarkable for anemia in spite of h/o 2u PRBC transfusions a few weeks PTA, so was admitted to medicine team for anemia.     #NEURO: Pt was agitated and confused overnight   currently on constant observation  Now aaox 2   - neuro assessment as per ICU protocol     # RESP: Complains of intermittent SOB and dyspnea   currently on NC 2L w/ Sao2   CXR/CT of chest confirmed large pleural effusion   - Oxygen supplement to maintain sao2 >92%  - Follow up Pulm rec: start empirical abx  - Serial ABG     # CARDIOVASCULAR   s/p elective RHC w/ low flow state  10/8 8AM CVP 12, PA 20/12 mixed 34.1 LA 2.1 CO/CI  4.6/2.2   - Cont. Inotrop gtt   - Hemodynamics w/ LA q6hrs  - Follow up w/ HF rec: Palliative care consult   Hypotension in setting of cardiogenic shock and vasophlegic status   - Cont. pressor support w/ levo and vaso gtt    # GI: DASH diet w/ fluid restriction  - Cont. PPI   - Nutritional supplement w/ folic acid/ MVI    # RENAL: baseline Cr <1.0  I&O's Summary  IN: 896.4 mL / OUT: 2405 mL([id number]/hr) / NET: -1508.6 mL  s/p Padilla due to urinary retention initially put out 700+400ml x 2hrs   currently CVP 12 w/ low PA  S/P lasix 40 IVP X 2 jessica-blood transfusion   - Start Lasix 2.5mg gtt     #HEM/ONC: s/p BMT(2019) last chemo (9/2021) became transfusion dependent, blood transfusion q 2weeks  acute anemia : no active bleeding s/s  - Hemolytic labs   - 1 unit of PRBC  - Cont. prophylactic antiviral agents     # MIS: Family made him DNR/DNI after extensive discussion w/ Dr. Joshi   Palliative care consult called  MOLST form completed (in chart)         62 y/o male with EXTENSIVE pmh remarkable for chronic systolic HF ACC/AHA stage C-D, presumed NICMP LVEF <20% LVIDd 7cm, AML s/p SCT '19, factor V mutation with h/o DVT/PE, osteomyelitis with R toe amputation, h/o aspergillosis/CMV viremia, thrombocytopenia, former smoker and COLLEEN not on CPAP who presented for elective RHC. Admission labs remarkable for anemia in spite of h/o 2u PRBC transfusions a few weeks PTA, so was admitted to medicine team for anemia.     #NEURO: Pt was agitated and confused overnight   currently on constant observation  Now aaox 2   - neuro assessment as per ICU protocol     # RESP: Complains of intermittent SOB and dyspnea   currently on NC 2L w/ Sao2   CXR/CT of chest confirmed large pleural effusion   - Oxygen supplement to maintain sao2 >92%  - Follow up Pulm rec  - Serial ABG     # CARDIOVASCULAR   s/p elective RHC w/ low flow state  10/8 8AM CVP 12, PA 20/12 mixed 34.1 LA 2.1 CO/CI  4.6/2.2   - Cont. Inotrop gtt   - Hemodynamics w/ LA q6hrs  - Follow up w/ HF rec: Palliative care consult   Hypotension in setting of cardiogenic shock and vasophlegic status   - Cont. pressor support w/ levo and vaso gtt    # GI: DASH diet w/ fluid restriction  - Cont. PPI   - Nutritional supplement w/ folic acid/ MVI    # RENAL: baseline Cr <1.0  I&O's Summary  IN: 896.4 mL / OUT: 2405 mL([id number]/hr) / NET: -1508.6 mL  s/p Padilla due to urinary retention initially put out 700+400ml x 2hrs   currently CVP 12 w/ low PA  S/P lasix 40 IVP X 2 jessica-blood transfusion   - Start Lasix 2.5mg gtt     #HEM/ONC: s/p BMT(2019) last chemo (9/2021) became transfusion dependent, blood transfusion q 2weeks  acute anemia : no active bleeding s/s  - Hemolytic labs   - 1 unit of PRBC  - Cont. prophylactic antiviral agents     # MIS: Family made him DNR/DNI after extensive discussion w/ Dr. Joshi   Palliative care consult called  MOLST form completed (in chart)

## 2021-10-08 NOTE — PROGRESS NOTE ADULT - SUBJECTIVE AND OBJECTIVE BOX
DI CONTRERAS  MRN-[id number]  Patient is a 61y old  Male who presents with a chief complaint of Right Heart Cath (08 Oct 2021 14:58)    HPI: 61M with PMHx of chronic systolic heart failure (bivenctricular failure), factor V leiden (not on anticoagulation), prior aspergillosis, COLLEEN (refuses CPAP) and AML (post-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 Patient presenting here for RHC to guide management given severe heart failure. Patient found to be anemia with hemoglobin of 6s (patient's baseline seems to be 9-10). Unable to get RHC due to anemia; thus, transferred to medicine. He was recently admitted to [facility] for falls and started on midodrine to augment BP. Patient seen on 3DSU prior to initiation of transfusions. He is very nonchalant. He has no complaints, shrugs to most of my questions.      REVIEW OF SYSTEMS:  CONSTITUTIONAL: No weakness, fevers or chills  EYES/ENT: No visual changes;  No vertigo or throat pain   NECK: No pain or stiffness  RESPIRATORY: No cough, wheezing, hemoptysis; + shortness of breath  CARDIOVASCULAR: No chest pain or palpitations  GASTROINTESTINAL: No abdominal or epigastric pain. No nausea, vomiting, or hematemesis; No diarrhea or constipation. No melena or hematochezia.  GENITOURINARY: No dysuria, frequency or hematuria  NEUROLOGICAL: No numbness or weakness  SKIN: No itching, rashes      ICU Vital Signs Last 24 Hrs  T(C): 37 (08 Oct 2021 19:00), Max: 39.5 (08 Oct 2021 14:00)  T(F): 98.6 (08 Oct 2021 19:00), Max: 103.1 (08 Oct 2021 14:00)  HR: 99 (08 Oct 2021 20:00) (86 - 130)  BP: 102/56 (08 Oct 2021 12:45) (77/52 - 108/58)  BP(mean): 72 (08 Oct 2021 12:45) (60 - 75)  ABP: 75/44 (08 Oct 2021 20:00) (75/41 - 103/60)  ABP(mean): 55 (08 Oct 2021 20:00) (54 - 83)  RR: 38 (08 Oct 2021 20:00) (20 - 57)  SpO2: 94% (08 Oct 2021 20:00) (84% - 100%)      CVP(mm Hg): 12 (10-08-21 @ 20:00) (3 - 21)  CO: 7.9 (10-07-21 @ 21:53) (4.4 - 7.9)  CI: 3.8 (10-07-21 @ 21:53) (2.1 - 3.8)  PA: 31/19 (10-08-21 @ 20:00) (19/16 - 70/40)  PA(mean): 25 (10-08-21 @ 20:00) (17 - 52)  SVR: 567 (10-07-21 @ 21:53) (567 - 567)      I&O's Summary    07 Oct 2021 07:  -  08 Oct 2021 07:00  --------------------------------------------------------  IN: 896.4 mL / OUT: 2405 mL / NET: -1508.6 mL    08 Oct 2021 07:  -  08 Oct 2021 20:47  --------------------------------------------------------  IN: 1302.7 mL / OUT: 195 mL / NET: 1107.7 mL  ============================I/O===========================   I&O's Detail    07 Oct 2021 07:  -  08 Oct 2021 07:00  --------------------------------------------------------  IN:    DOBUTamine: 160 mL    Furosemide: 40 mL    IV PiggyBack: 150 mL    Norepinephrine: 61.6 mL    Oral Fluid: 480 mL    Vasopressin: 4.8 mL  Total IN: 896.4 mL    OUT:    Indwelling Catheter - Urethral (mL): 1405 mL    Voided (mL): 1000 mL  Total OUT: 2405 mL    Total NET: -1508.6 mL      08 Oct 2021 07:01  -  08 Oct 2021 20:47  --------------------------------------------------------  IN:    DOBUTamine: 136.4 mL    Furosemide: 7.8 mL    IV PiggyBack: 50 mL    IV PiggyBack: 250 mL    Norepinephrine: 272.1 mL    Oral Fluid: 410 mL    PRBCs (Packed Red Blood Cells): 150 mL    Vasopressin: 26.4 mL  Total IN: 1302.7 mL    OUT:    Indwelling Catheter - Urethral (mL): 195 mL  Total OUT: 195 mL    Total NET: 1107.7 mL  ============================ LABS =========================                7.0    4.93  )-----------( 41       ( 08 Oct 2021 04:47 )             20.2     10-08    138  |  101  |  23  ----------------------------<  177<H>  4.9   |  16<L>  |  1.11    Ca    8.5      08 Oct 2021 09:45  Phos  3.8     10-  Mg     2.2     10-08    TPro  4.9<L>  /  Alb  3.4  /  TBili  1.6<H>  /  DBili  x   /  AST  37  /  ALT  47<H>  /  AlkPhos  220<H>  10    LIVER FUNCTIONS - ( 08 Oct 2021 09:45 )  Alb: 3.4 g/dL / Pro: 4.9 g/dL / ALK PHOS: 220 U/L / ALT: 47 U/L / AST: 37 U/L / GGT: x           PT/INR - ( 08 Oct 2021 09:45 )   PT: 18.9 sec;   INR: 1.61 ratio    PTT - ( 08 Oct 2021 09:45 )  PTT:30.3 sec  ABG - ( 08 Oct 2021 11:40 )  pH, Arterial: 7.36  pH, Blood: x     /  pCO2: 22    /  pO2: 83    / HCO3: 12    / Base Excess: -11.6 /  SaO2: 98.6      Blood Gas Arterial, Lactate: 10.3 mmol/L (10-08-21 @ 11:40)  Blood Gas Arterial, Lactate: 5.4 mmol/L (10-08-21 @ 09:35)  Blood Gas Arterial, Lactate: 2.1 mmol/L (10-08-21 @ 07:54)  Lactate, Blood: 1.0 mmol/L (10-08-21 @ 04:47)  Blood Gas Arterial, Lactate: 0.9 mmol/L (10-08-21 @ 04:43)  Blood Gas Venous - Lactate: 1.0 mmol/L (10-07-21 @ 20:41)  Blood Gas Venous - Lactate: 0.9 mmol/L (10-07-21 @ 16:32)    Urinalysis Basic - ( 08 Oct 2021 04:47 )    Color: Yellow / Appearance: Clear / S.013 / pH: x  Gluc: x / Ketone: Negative  / Bili: Negative / Urobili: 2 mg/dL   Blood: x / Protein: Negative / Nitrite: Negative   Leuk Esterase: Negative / RBC: x / WBC x   Sq Epi: x / Non Sq Epi: x / Bacteria: x  ======================Micro/Rad/Cardio=================  Telemetry: Reviewed   EKG: Reviewed  CXR: Reviewed  Culture: Reviewed   Echo: Transthoracic Echocardiogram:   Patient name: ID CONTRERAS  YOB: 1960   Age: 61 (M)   MR#: [id number]  Study Date: 10/8/2021  [facility] RD[facility]  Study quality: Technically difficult  Referring Physician: Marilyn Joshi MD  Blood Pressure: 102/56 mmHg  Height: 185 cm  Weight: 82 kg  BSA: 2.1 m2  ------------------------------------------------------------------------  PROCEDURE: Transthoracic echocardiogram with 2-D, M-Mode  and complete spectral and color flow Doppler.  INDICATION: Shock, unspecified (R57.9)  ------------------------------------------------------------------------  Dimensions:    Normal Values:  LA:     3.5    2.0 - 4.0 cm  Ao:     3.9    2.0 - 3.8 cm  SEPTUM: 0.7    0.6 - 1.2 cm  PWT:    0.8    0.6 - 1.1cm  LVIDd:  7.1    3.0 - 5.6 cm  LVIDs:  6.2    1.8 - 4.0 cm  Derived variables:  LVMI: 123 g/m2  RWT: 0.24  EF (Visual Estimate): 25 %  ------------------------------------------------------------------------  Observations:  Mitral Valve: Normal mitral valve.  Aortic Valve/Aorta: Calcified aortic valve with normal  opening.  Normal aortic root size.  Left Atrium: Moderately dilated left atrium.  Left Ventricle: Severe left ventricular enlargement.  Severely decreased left ventricualr systolic function.  Diffuse hypokinesis.  Right Heart: Normal right atrium.  Right ventricular enlargement with decreased right  ventricular systolic function.  A catheter is noted in the right heart.  Severe tricuspid regurgitation.  Normal pulmonic valve. Mildpulmonic regurgitation.  Pericardium/Pleura: Normal pericardium with no pericardial  effusion.  Left pleural effusion.  Hemodynamic: Cannot estimate pulmonary artery pressure; TR  is too severe.  ------------------------------------------------------------------------  Conclusions:  Sonographer's note: patient / family refused to allow  completion of the study.  Severe left ventricular enlargement.  Severely decreased left ventricualr systolic function.  Diffuse hypokinesis.  Right ventricular enlargement with decreased right  ventricular systolic function.  Severe tricuspid regurgitation.  A catheter is noted in the right heart.  ======================================================  PAST MEDICAL & SURGICAL HISTORY:  Factor 5 Leiden mutation, heterozygous    H/O cardiomyopathy    Deep vein thrombosis (DVT)    Pulmonary embolism    COLLEEN (obstructive sleep apnea)  as per pt. he tried cpap/ bipap in the past did not like it so never used after that.    Thrombocytopenia    History of hypotension    H/O bone marrow transplant

## 2021-10-08 NOTE — PROGRESS NOTE ADULT - ASSESSMENT
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 Admission labs remarkable for anemia in spite of h/o 2u PRBC transfusions a few weeks PTA, so was admitted to medicine team for anemia. He is currently receiving his 4th 1/2 unit of PRBC and plan is for RHC today. Will leave in PA catheter based on hemodynamics, suspect low output state.     - he has had NYHA Class III-IV Stage C nonischemic systolic HF  - right heart cath yesterday revealed pa sat 38 and was moved to the icu for a trial of inotropic support  -was on  5, then 2.5.  -  now increased again to 5, with the addition of levo and vaso overnight  - cont lasix 40mg IV at dosing intervals tolerable by bp  -urine output uncertain and will need saucedo  -mixed venous sat 24 earlier this am and will need to continue to trend  - Please continue to maintain strict I/Os, monitor daily weights, Cr, and K.  - not a candidate for heart transplant or LVAD at this time given his comorbidities    -apparent failure of bone marrow transplant  -onc followup to prognosis and goals of care  - transfusion dependent  - Tx PRBCs in order to maintain H/H >8/24  - monitor platelets    - resume toprol when able from the perspective of inotropic support and bp  - bp limits HF meds overall     -discussed with daughter by phone, who plans to come in this am to discuss his care    - Further cardiac workup will depend on clinical course.   - All other workup per primary team. Will followup.     Upon my evaluation, this patient is at high risk for imminent or life threatening deterioration due to cardiogenic shock, hypotension,  and other active medical issues which require my direct attention, intervention, and personal management.  I have personally spent >35 minutes  of critical care time exclusive of time spent on separate billing procedures. This includes review of laboratory data, radiology results, discussion with primary team\patient, and monitoring for potential decompensation Interventions were performed as documented above.

## 2021-10-08 NOTE — PROGRESS NOTE ADULT - ASSESSMENT
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 Admission labs remarkable for anemia in spite of h/o 2u PRBC transfusions a few weeks PTA, so was admitted to medicine team for anemia.     He is s/p 2 unit PRBC yesterday as well as RHC which showed elevated filling pressures and low cardiac output. He was started on a Lasix gtt/dobutamine and moved to CICU. Overnight became agitated/disoriented, tachypneic, and tachycardic to the 120s. Also c/o chills (afebrile), HA, and urinary retention. Placed on BiPAP briefly, now on Venti mask. Was also hypotensive and started on pressors. Hgb dropped again this morning and he is receiving 1 unit PRBC (ordered in 1/2 units).     Overall, poor prognosis and GOC discussed with him and his significant other. Patient was disengaged in the conversation, but his SO stated that he had a prior DNR/DNI during his last hospitalization. Will have palliative c/s since he is not a candidate for advanced therapies due to multiple comorbidities, active etoh use and he does not seem interested in aggressive treatments per prior conversations.     Hemodynamics:  10/8 ( 5, levo .15, vaso .04): CVP 17, PA 37/17/28, PA sat 34.1 (off ?), CO/CI 3.7/1.8  10/7 RHC: RA 17, PA 43/22/33, PCWP 25, PA sat 30.3%, CI 2.0?, SBP 80s    Pertinent Cardiac Studies   2021 TTE: LVEF <20%, LVIDd 7cm, trace MR, mild TR, RVSP 28 mmHg (RAP 3 mmHg)  2020 TTE: LVEF 21% LVIDd ?6.03cm, GLS Avg -7.9%, mild MR, RV mildly enlarged w severely decreased systolic function (S ' 0.9 m/s, TAPSE 0.7cm ), moderate TR, RVSP 37mmHg (RAP ~10 mmHg)  2020 LVEF 31% LVIDd 7.3cm, mild MR, grade DD, nl RV size/function, RVSP 51mmHg (RAP ~8 mmHg)   2019 LVEF 42% LVIDd 7.0 cm, mild MR, Nl RV size/function.       cMRI: LVEF 24%, LV diameter 5.3cm, globally hypokinetic, CO/CI 4.79/2.31. Mid wall myocardial LGE at the base involving anteroseptal and inferoseptal wall is associated with a nonischemic cardiomyopathy. Nl LV thrombus.     LHC done in  which showed no CAD (as per prior notes-?[facility])

## 2021-10-08 NOTE — CONSULT NOTE ADULT - ASSESSMENT
61M with extensive PMH notable for HFrEF (stage C-D), presumed NICMP (LVEF < 20%), AML s/p BMT (2019), factor V mutation, history of DVT/PE, OM s/p R toe amputation, former tobacco, COLLEEN not on CPAP, who presented for elective right heart cath. Due to thrombocytopnia and history of infections, patient hasn't been an ideal candidate for AICD placement, with CHF team recommending palliative inotropes; also not a candidate for LVAD or transplant. Patient required pRBC transfusions, and in setting of anemia and low platelets, AC on hold. Patient was moved to CICU for a trial of inotropic support. Also noted to have a pulmonary effusion, with pulm consulted. Palliative care consulted for GOC.

## 2021-10-08 NOTE — PROGRESS NOTE ADULT - PROBLEM SELECTOR PLAN 2
Presumed NICMP LVEF <20% LVIDd 7cm  - GDMT: holding GDMT in the setting of cardiogenic shock  - Diuretics: Restart Lasix gtt, uptitrate as needed.  - Device: Not an ideal candidate for ICD due to thrombocytopenia and will also be based on prognosis.  - Advanced therapies: He has clear clinical evidence of advanced disease. His multiple comorbidities preclude heart tx. His thrombocytopenia, inability to take anticoagulation, RV dysfunction, frailty and h/o infections make him a suboptimal candidate for LVAD. Can consider palliative inotropes however both patient and wife would like to avoid. Palliative c/s as above. Presumed NICMP LVEF <20% LVIDd 7cm  - GDMT: holding GDMT in the setting of cardiogenic shock  - Diuretics: Restart Lasix gtt, uptitrate as needed.  - Device: Not an ideal candidate for ICD due to thrombocytopenia and overall poor prognosis.  - Advanced therapies: He has clear clinical evidence of advanced disease. His multiple comorbidities which preclude heart tx. His thrombocytopenia, inability to take anticoagulation, RV dysfunction, frailty and h/o infections make him a suboptimal candidate for LVAD.   Palliative c/s as above.

## 2021-10-08 NOTE — PROGRESS NOTE ADULT - PROBLEM SELECTOR PLAN 1
- continue dobutamine 5 mcg/kg/min  - c/w levo/vaso and wean if tolerated  - trend hemodynamics and perfusion labs (MvO2, lactate, and LFTs)  - palliative c/s to discuss GOC and code status - continue dobutamine 5 mcg/kg/min  - c/w levo/vaso to maintain MAP >65  - trend hemodynamics and perfusion labs (MvO2, lactate, and LFTs)  - palliative c/s to discuss GOC and code status - continue dobutamine 5 mcg/kg/min  - c/w levo/vaso to maintain MAP >65  - trend hemodynamics and perfusion labs (MvO2, lactate, and LFTs)  Not a candidate for tMCS  - palliative c/s to discuss GOC and code status

## 2021-10-08 NOTE — DISCHARGE NOTE PROVIDER - NSDCHHENCOUNTER_GEN_ALL_CORE
Detail Level: Detailed 16-Jul-2019 Depth Of Biopsy: dermis Was A Bandage Applied: Yes Size Of Lesion In Cm: 0.6 X Size Of Lesion In Cm: 0 Biopsy Type: H and E Biopsy Method: Dermablade Anesthesia Type: 1% lidocaine with epinephrine Anesthesia Volume In Cc (Will Not Render If 0): 0.5 Hemostasis: Drysol Wound Care: Petrolatum Dressing: bandage Destruction After The Procedure: No Type Of Destruction Used: Curettage Curettage Text: The wound bed was treated with curettage after the biopsy was performed. Cryotherapy Text: The wound bed was treated with cryotherapy after the biopsy was performed. Electrodesiccation Text: The wound bed was treated with electrodesiccation after the biopsy was performed. Electrodesiccation And Curettage Text: The wound bed was treated with electrodesiccation and curettage after the biopsy was performed. Silver Nitrate Text: The wound bed was treated with silver nitrate after the biopsy was performed. Lab: [id number] Lab Facility: [id number] Consent: Written consent was obtained and risks were reviewed including but not limited to scarring, infection, bleeding, scabbing, incomplete removal, nerve damage and allergy to anesthesia. Post-Care Instructions: I reviewed with the patient in detail post-care instructions. Patient is to keep the biopsy site dry overnight, and then apply bacitracin twice daily until healed. Patient may apply hydrogen peroxide soaks to remove any crusting. Notification Instructions: Patient will be notified of biopsy results. However, patient instructed to call the office if not contacted within 2 weeks. Billing Type: Third-Party Bill Information: Selecting Yes will display possible errors in your note based on the variables you have selected. This validation is only offered as a suggestion for you. PLEASE NOTE THAT THE VALIDATION TEXT WILL BE REMOVED WHEN YOU FINALIZE YOUR NOTE. IF YOU WANT TO FAX A PRELIMINARY NOTE YOU WILL NEED TO TOGGLE THIS TO 'NO' IF YOU DO NOT WANT IT IN YOUR FAXED NOTE.

## 2021-10-08 NOTE — PROVIDER CONTACT NOTE (EICU) - BACKGROUND
pt admitted today with chemo inducted cardiomypathy. on dobutamine gtt at 5mcg/kg/min & lasix gtt at 10mg/hr that is retaining urine and refusing bladder scans and a saucedo catheter at 10mg/hr.

## 2021-10-08 NOTE — CONSULT NOTE ADULT - ASSESSMENT
60 yo M PMH stage C-D chronic NICM w/ EF <20%, factor V mutation w/ h/o DVT/PE, osteomyelitis w/ R toe amputation, prior aspergillosis and CMV, thrombocytopenia, prior tobacco use, COLLEEN not on CPAP, presenting for elective right heart cath admission. 62 yo M PMH stage C-D chronic NICM w/ EF <20%, factor V mutation w/ h/o DVT/PE, osteomyelitis w/ R toe amputation, prior aspergillosis and CMV, thrombocytopenia, prior tobacco use, COLLEEN not on CPAP, presenting for elective right heart cath admission. Course c/b anemia and cardiogenic shock requiring ionotrope assistance.     - would recommend infectious workup, consider initiating empiric abx  - left sided pleural effusion noted on imaging and ultrasound, though appears chronic in nature and has been present since August  - initially breathing comfortably on 2LNC this morning though w/ worsening tachypnea/hypoxemia requiring venturi; suspect shortness of breath is moreso related to shock/RV dysfunction rather than effusion. Furthermore given instability, thrombocytopenia, and hypotension would not perform thoracentesis at this time  - diuresis as tolerated    Benjamin Shine MD  PGY-5  PCCM Fellow  Pager [phone] 60 yo M PMH stage C-D chronic NICM w/ EF <20%, factor V mutation w/ h/o DVT/PE, osteomyelitis w/ R toe amputation, prior aspergillosis and CMV, thrombocytopenia, prior tobacco use, COLLEEN not on CPAP, presenting for elective right heart cath admission. Course c/b anemia and cardiogenic shock requiring ionotrope assistance.     - would recommend infectious workup, consider initiating empiric abx  - left sided pleural effusion noted on imaging and ultrasound, though appears chronic in nature and has been present since August  - initially breathing comfortably on 2LNC this morning though w/ worsening tachypnea/hypoxemia requiring venturi; suspect shortness of breath is moreso related to shock/RV dysfunction/transfusion rather than effusion. Furthermore given instability, thrombocytopenia, and hypotension would not perform thoracentesis at this time  - diuresis as tolerated    Benjamin Shine MD  PGY-5  PCCM Fellow  Pager [phone]

## 2021-10-08 NOTE — CONSULT NOTE ADULT - SUBJECTIVE AND OBJECTIVE BOX
HPI: 61M with extensive PMH notable for HFrEF (stage C-D), presumed NICMP (LVEF < 20%), AML s/p BMT (2019), factor V mutation, history of DVT/PE, OM s/p R toe amputation, former tobacco, COLLEEN not on CPAP, who presented for elective right heart cath. Due to thrombocytopnia and history of infections, patient hasn't been an ideal candidate for AICD placement, with CHF team recommending palliative inotropes; also not a candidate for LVAD or transplant. Patient required pRBC transfusions, and in setting of anemia and low platelets, AC on hold. Patient was moved to CICU for a trial of inotropic support. Also noted to have a pulmonary effusion, with pulm consulted. Palliative care consulted for GOC.    PERTINENT PM/SXH:   Factor 5 Leiden mutation, heterozygous    H/O cardiomyopathy    Deep vein thrombosis (DVT)    Pulmonary embolism    COLLEEN (obstructive sleep apnea)    Thrombocytopenia    History of hypotension      No significant past surgical history    H/O bone marrow transplant      FAMILY HISTORY:  FH: heart attack  father, mother, and brother      ITEMS NOT CHECKED ARE NOT PRESENT    SOCIAL HISTORY:   Significant other/partner[ X]  Children[X ]  Scientologist/Spirituality:  Substance hx:  [ ]   Tobacco hx:  [ ]   Alcohol hx: [ ]   Home Opioid hx:  [ ] I-Stop Reference No: [id number] with xanax 0.5mg  Living Situation: [X ]Home  [ ]Long term care  [ ]Rehab [ ]Other  Home Services: [ ] HHA [ ] Visting RN [ ] Hospice    ADVANCE DIRECTIVES:    DNR  MOLST  [ ]  Living Will  [ ]   DECISION MAKER(s):  [ ] Health Care Proxy(s)  [ ] Surrogate(s)  [ ] Guardian           Name(s): Phone Number(s): significant other Ashlyn Duran  [phone]      BASELINE (I)ADL(s) (prior to admission):  [place]: [ ]Total  [ ] Moderate [ ]Dependent    Allergies    No Known Allergies    Intolerances    MEDICATIONS  (STANDING):  acyclovir   Oral Tab/Cap 400 milliGRAM(s) Oral two times a day  chlorhexidine 2% Cloths 1 Application(s) Topical daily  DOBUTamine Infusion 5 MICROgram(s)/kG/Min (12.3 mL/Hr) IV Continuous <Continuous>  finasteride 5 milliGRAM(s) Oral daily  folic acid 1 milliGRAM(s) Oral daily  furosemide Infusion 2.5 mG/Hr (1.25 mL/Hr) IV Continuous <Continuous>  influenza   Vaccine 0.5 milliLiter(s) IntraMuscular once  multivitamin 1 Tablet(s) Oral daily  norepinephrine Infusion 0.05 MICROgram(s)/kG/Min (7.69 mL/Hr) IV Continuous <Continuous>  pantoprazole    Tablet 40 milliGRAM(s) Oral before breakfast  sertraline 50 milliGRAM(s) Oral daily  tamsulosin 0.4 milliGRAM(s) Oral at bedtime  vasopressin Infusion 0.04 Unit(s)/Min (2.4 mL/Hr) IV Continuous <Continuous>  voriconazole 200 milliGRAM(s) Oral every 12 hours    MEDICATIONS  (PRN):  acetaminophen   Tablet .. 650 milliGRAM(s) Oral every 6 hours PRN Temp greater or equal to 38C (100.4F), Mild Pain (1 - 3)  ALPRAZolam 0.5 milliGRAM(s) Oral at bedtime PRN Anxiety  diphenhydrAMINE 25 milliGRAM(s) Oral every 4 hours PRN Rash and/or Itching    PRESENT SYMPTOMS: [ ]Unable to obtain due to poor mentation   Source if other than patient:  [ ]Family   [ ]Team     Pain: [ ]yes [ ]no  QOL impact -   Location -                    Aggravating factors -  Quality -  Radiation -  Timing-  Severity (0-10 scale):  Minimal acceptable level (0-10 scale):     CPOT:    https://www.[facility].org/getattachment/[email or web address]/Critical-Care-Pain-Observation-Tool-(CPOT)      PAIN AD Score:     http://geriatrictoolkit.[place].[facility]/cog/painad.pdf (press ctrl +  left click to view)    Dyspnea:                           [ ]Mild [ ]Moderate [ ]Severe  Anxiety:                             [ ]Mild [ ]Moderate [ ]Severe  Fatigue:                             [ ]Mild [ ]Moderate [ ]Severe  Nausea:                             [ ]Mild [ ]Moderate [ ]Severe  Loss of appetite:              [ ]Mild [ ]Moderate [ ]Severe  Constipation:                    [ ]Mild [ ]Moderate [ ]Severe    Other Symptoms:  [ ]All other review of systems negative     Palliative Performance Status Version 2:         %    http://npcrc.org/files/news/palliative_performance_scale_ppsv2.pdf  PHYSICAL EXAM:  Vital Signs Last 24 Hrs  T(C): 37 (08 Oct 2021 12:00), Max: 37.2 (08 Oct 2021 00:00)  T(F): 98.6 (08 Oct 2021 12:00), Max: 99 (08 Oct 2021 00:00)  HR: 88 (08 Oct 2021 12:00) (82 - 130)  BP: 95/64 (08 Oct 2021 05:00) (77/52 - 108/58)  BP(mean): 75 (08 Oct 2021 05:00) (60 - 77)  RR: 57 (08 Oct 2021 12:00) (15 - 57)  SpO2: 93% (08 Oct 2021 12:00) (74% - 100%) I&O's Summary    07 Oct 2021 07:01  -  08 Oct 2021 07:00  --------------------------------------------------------  IN: 896.4 mL / OUT: 2405 mL / NET: -1508.6 mL    08 Oct 2021 07:01  -  08 Oct 2021 12:46  --------------------------------------------------------  IN: 459.5 mL / OUT: 90 mL / NET: 369.5 mL      GENERAL:  [ ]Alert  [ ]Oriented x   [ ]Lethargic  [ ]Cachexia  [ ]Unarousable  [ ]Verbal  [ ]Non-Verbal  Behavioral:   [ ] Anxiety  [ ] Delirium [ ] Agitation [ ] Other  HEENT:  [ ]Normal   [ ]Dry mouth   [ ]ET Tube/Trach  [ ]Oral lesions  PULMONARY:   [ ]Clear [ ]Tachypnea  [ ]Audible excessive secretions   [ ]Rhonchi        [ ]Right [ ]Left [ ]Bilateral  [ ]Crackles        [ ]Right [ ]Left [ ]Bilateral  [ ]Wheezing     [ ]Right [ ]Left [ ]Bilateral  [ ]Diminished breath sounds [ ]right [ ]left [ ]bilateral  CARDIOVASCULAR:    [ ]Regular [ ]Irregular [ ]Tachy  [ ]Maximino [ ]Murmur [ ]Other  GASTROINTESTINAL:  [ ]Soft  [ ]Distended   [ ]+BS  [ ]Non tender [ ]Tender  [ ]PEG [ ]OGT/ NGT  Last BM:   GENITOURINARY:  [ ]Normal [ ] Incontinent   [ ]Oliguria/Anuria   [ ]Padilla  MUSCULOSKELETAL:   [ ]Normal   [ ]Weakness  [ ]Bed/Wheelchair bound [ ]Edema  NEUROLOGIC:   [ ]No focal deficits  [ ]Cognitive impairment  [ ]Dysphagia [ ]Dysarthria [ ]Paresis [ ]Other   SKIN:   [ ]Normal    [ ]Rash  [ ]Pressure ulcer(s)       Present on admission [ ]y [ ]n    CRITICAL CARE:  [ ] Shock Present  [ ]Septic [ ]Cardiogenic [ ]Neurologic [ ]Hypovolemic  [ ]  Vasopressors [ ]  Inotropes   [ ]Respiratory failure present [ ]Mechanical ventilation [ ]Non-invasive ventilatory support [ ]High flow  [ ]Acute  [ ]Chronic [ ]Hypoxic  [ ]Hypercarbic [ ]Other  [ ]Other organ failure     LABS:                        7.0    4.93  )-----------( 41       ( 08 Oct 2021 04:47 )             20.2   10-08    138  |  101  |  23  ----------------------------<  177<H>  4.9   |  16<L>  |  1.11    Ca    8.5      08 Oct 2021 09:45  Phos  3.8     10-08  Mg     2.2     10-08    TPro  4.9<L>  /  Alb  3.4  /  TBili  1.6<H>  /  DBili  x   /  AST  37  /  ALT  47<H>  /  AlkPhos  220<H>  10-08  PT/INR - ( 08 Oct 2021 09:45 )   PT: 18.9 sec;   INR: 1.61 ratio         PTT - ( 08 Oct 2021 09:45 )  PTT:30.3 sec    Urinalysis Basic - ( 08 Oct 2021 04:47 )    Color: Yellow / Appearance: Clear / S.013 / pH: x  Gluc: x / Ketone: Negative  / Bili: Negative / Urobili: 2 mg/dL   Blood: x / Protein: Negative / Nitrite: Negative   Leuk Esterase: Negative / RBC: x / WBC x   Sq Epi: x / Non Sq Epi: x / Bacteria: x      RADIOLOGY & ADDITIONAL STUDIES:    CXR 10.  [place]-shayla catheter in the right main pulmonary artery.     PROTEIN CALORIE MALNUTRITION PRESENT: [ ]mild [ ]moderate [ ]severe [ ]underweight [ ]morbid obesity  https://www.andeal.org/vault/2440/web/files/ONC/Table_Clinical%20Characteristics%20to%20Document%20Malnutrition-White%20JV%20et%20al%2020.pdf    Height (cm): 185.4 (10-07-21 @ 17:35), 185.4 (21 @ 13:09)  Weight (kg): 82 (10-07-21 @ 17:35), 79.4 (21 @ 13:09)  BMI (kg/m2): 23.9 (10-07-21 @ 17:35), 23.1 (21 @ 13:09)    [ ]PPSV2 < or = to 30% [ ]significant weight loss  [ ]poor nutritional intake  [ ]anasarca      [ ]Artificial Nutrition      REFERRALS:   [ ]Chaplaincy  [ ]Hospice  [ ]Child Life  [ ]Social Work  [ ]Case management [ ]Holistic Therapy     Goals of Care Document:     ______________  Jairo Benjamin MD   of Geriatric and Palliative Medicine  [facility]     Please page the following number for clinical matters between the hours of 9AM and 5PM   from Monday through Friday : [phone]    After 5PM and on weekends, please page: [phone]. The Geriatric and Palliative Medicine consult service has  coverage for medical recommendations, including for symptom management needs. HPI: 61M with extensive PMH notable for HFrEF (stage C-D), presumed NICMP (LVEF < 20%), AML s/p BMT (2019), factor V mutation, history of DVT/PE, OM s/p R toe amputation, former tobacco, COLLEEN not on CPAP, who presented for elective right heart cath. Due to thrombocytopnia and history of infections, patient hasn't been an ideal candidate for AICD placement, with CHF team recommending palliative inotropes; also not a candidate for LVAD or transplant. Patient required pRBC transfusions, and in setting of anemia and low platelets, AC on hold. Patient was moved to CICU for a trial of inotropic support. Also noted to have a pulmonary effusion, with pulm consulted. Palliative care consulted for GOC.    PERTINENT PM/SXH:   Factor 5 Leiden mutation, heterozygous    H/O cardiomyopathy    Deep vein thrombosis (DVT)    Pulmonary embolism    COLLEEN (obstructive sleep apnea)    Thrombocytopenia    History of hypotension      No significant past surgical history    H/O bone marrow transplant      FAMILY HISTORY:  FH: heart attack  father, mother, and brother      ITEMS NOT CHECKED ARE NOT PRESENT    SOCIAL HISTORY:   Significant other/partner[ X]  Children[X ]  Scientologist/Spirituality:  Substance hx:  [ ]   Tobacco hx:  [ ]   Alcohol hx: [ ]   Home Opioid hx:  [ ] I-Stop Reference No: [id number] with xanax 0.5mg  Living Situation: [X ]Home  [ ]Long term care  [ ]Rehab [ ]Other  Home Services: [ ] HHA [ ] Visting RN [ ] Hospice    ADVANCE DIRECTIVES:    DNR  MOLST  [ ]  Living Will  [ ]   DECISION MAKER(s):  [ ] Health Care Proxy(s)  [ ] Surrogate(s)  [ ] Guardian           Name(s): Phone Number(s): significant other Ashlyn Duran  [phone]      BASELINE (I)ADL(s) (prior to admission):  [place]: [ ]Total  [ ] Moderate [ ]Dependent    Allergies    No Known Allergies    Intolerances    MEDICATIONS  (STANDING):  acyclovir   Oral Tab/Cap 400 milliGRAM(s) Oral two times a day  chlorhexidine 2% Cloths 1 Application(s) Topical daily  DOBUTamine Infusion 5 MICROgram(s)/kG/Min (12.3 mL/Hr) IV Continuous <Continuous>  finasteride 5 milliGRAM(s) Oral daily  folic acid 1 milliGRAM(s) Oral daily  furosemide Infusion 2.5 mG/Hr (1.25 mL/Hr) IV Continuous <Continuous>  influenza   Vaccine 0.5 milliLiter(s) IntraMuscular once  multivitamin 1 Tablet(s) Oral daily  norepinephrine Infusion 0.05 MICROgram(s)/kG/Min (7.69 mL/Hr) IV Continuous <Continuous>  pantoprazole    Tablet 40 milliGRAM(s) Oral before breakfast  sertraline 50 milliGRAM(s) Oral daily  tamsulosin 0.4 milliGRAM(s) Oral at bedtime  vasopressin Infusion 0.04 Unit(s)/Min (2.4 mL/Hr) IV Continuous <Continuous>  voriconazole 200 milliGRAM(s) Oral every 12 hours    MEDICATIONS  (PRN):  acetaminophen   Tablet .. 650 milliGRAM(s) Oral every 6 hours PRN Temp greater or equal to 38C (100.4F), Mild Pain (1 - 3)  ALPRAZolam 0.5 milliGRAM(s) Oral at bedtime PRN Anxiety  diphenhydrAMINE 25 milliGRAM(s) Oral every 4 hours PRN Rash and/or Itching    PRESENT SYMPTOMS: [ ]Unable to obtain due to poor mentation   Source if other than patient:  [ ]Family   [ ]Team     Pain: [ ]yes [ X]no  QOL impact -   Location -                    Aggravating factors -  Quality -  Radiation -  Timing-  Severity (0-10 scale):  Minimal acceptable level (0-10 scale):     CPOT:    https://www.[facility].org/getattachment/[email or web address]/Critical-Care-Pain-Observation-Tool-(CPOT)      PAIN AD Score:     http://geriatrictoolkit.[place].[facility]/cog/painad.pdf (press ctrl +  left click to view)    Dyspnea:                           [X ]Mild [ ]Moderate [ ]Severe  Anxiety:                             [ ]Mild [ ]Moderate [ ]Severe  Fatigue:                             [ ]Mild [ ]Moderate [ ]Severe  Nausea:                             [ ]Mild [ ]Moderate [ ]Severe  Loss of appetite:              [ ]Mild [ ]Moderate [ ]Severe  Constipation:                    [ ]Mild [ ]Moderate [ ]Severe    Other Symptoms:  [X ]All other review of systems negative     Palliative Performance Status Version 2:         %    http://npcrc.org/files/news/palliative_performance_scale_ppsv2.pdf  PHYSICAL EXAM:  Vital Signs Last 24 Hrs  T(C): 37 (08 Oct 2021 12:00), Max: 37.2 (08 Oct 2021 00:00)  T(F): 98.6 (08 Oct 2021 12:00), Max: 99 (08 Oct 2021 00:00)  HR: 88 (08 Oct 2021 12:00) (82 - 130)  BP: 95/64 (08 Oct 2021 05:00) (77/52 - 108/58)  BP(mean): 75 (08 Oct 2021 05:00) (60 - 77)  RR: 57 (08 Oct 2021 12:00) (15 - 57)  SpO2: 93% (08 Oct 2021 12:00) (74% - 100%) I&O's Summary    07 Oct 2021 07:01  -  08 Oct 2021 07:00  --------------------------------------------------------  IN: 896.4 mL / OUT: 2405 mL / NET: -1508.6 mL    08 Oct 2021 07:01  -  08 Oct 2021 12:46  --------------------------------------------------------  IN: 459.5 mL / OUT: 90 mL / NET: 369.5 mL      GENERAL:  [ ]Alert  [ ]Oriented x   [ ]Lethargic  [ ]Cachexia  [ ]Unarousable  [ X]Verbal  [ ]Non-Verbal  Behavioral:   [ ] Anxiety  [ ] Delirium [ ] Agitation [X ] Other  calm  HEENT:  [ X]Normal   [ ]Dry mouth   [ ]ET Tube/Trach  [ ]Oral lesions  PULMONARY:   [X ]Clear [ ]Tachypnea  [ ]Audible excessive secretions   [ ]Rhonchi        [ ]Right [ ]Left [ ]Bilateral  [ ]Crackles        [ ]Right [ ]Left [ ]Bilateral  [ ]Wheezing     [ ]Right [ ]Left [ ]Bilateral  [ ]Diminished breath sounds [ ]right [ ]left [ ]bilateral  CARDIOVASCULAR:    [ X]Regular [ ]Irregular [ ]Tachy  [ ]Maximino [ ]Murmur [ ]Other  GASTROINTESTINAL:  [ X]Soft  [ ]Distended   [ X]+BS  [X ]Non tender [ ]Tender  [ ]PEG [ ]OGT/ NGT  Last BM:   GENITOURINARY:  [X ]Normal [ ] Incontinent   [ ]Oliguria/Anuria   [ ]Padilla  MUSCULOSKELETAL:   [ ]Normal   [ ]Weakness  [ ]Bed/Wheelchair bound [ ]Edema  NEUROLOGIC:   [ ]No focal deficits  [X ]Cognitive impairment  [ ]Dysphagia [ ]Dysarthria [ ]Paresis [ ]Other   SKIN:   [ ]Normal    [ ]Rash  [ ]Pressure ulcer(s) [X] Skin tear      Present on admission [ ]y [ ]n    CRITICAL CARE:  [ ] Shock Present  [ ]Septic [ ]Cardiogenic [ ]Neurologic [ ]Hypovolemic  [ ]  Vasopressors [ ]  Inotropes   [ ]Respiratory failure present [ ]Mechanical ventilation [ ]Non-invasive ventilatory support [ ]High flow  [ ]Acute  [ ]Chronic [ ]Hypoxic  [ ]Hypercarbic [ ]Other  [ ]Other organ failure     LABS: reviewed                        7.0    4.93  )-----------( 41       ( 08 Oct 2021 04:47 )             20.2   10-08    138  |  101  |  23  ----------------------------<  177<H>  4.9   |  16<L>  |  1.11    Ca    8.5      08 Oct 2021 09:45  Phos  3.8     10-08  Mg     2.2     10-08    TPro  4.9<L>  /  Alb  3.4  /  TBili  1.6<H>  /  DBili  x   /  AST  37  /  ALT  47<H>  /  AlkPhos  220<H>  10-08  PT/INR - ( 08 Oct 2021 09:45 )   PT: 18.9 sec;   INR: 1.61 ratio         PTT - ( 08 Oct 2021 09:45 )  PTT:30.3 sec    Urinalysis Basic - ( 08 Oct 2021 04:47 )    Color: Yellow / Appearance: Clear / S.013 / pH: x  Gluc: x / Ketone: Negative  / Bili: Negative / Urobili: 2 mg/dL   Blood: x / Protein: Negative / Nitrite: Negative   Leuk Esterase: Negative / RBC: x / WBC x   Sq Epi: x / Non Sq Epi: x / Bacteria: x      RADIOLOGY & ADDITIONAL STUDIES:    CXR 10.  [place]-shayla catheter in the right main pulmonary artery.     PROTEIN CALORIE MALNUTRITION PRESENT: [ ]mild [ ]moderate [ ]severe [ ]underweight [ ]morbid obesity  https://www.andeal.org/vault/2440/web/files/ONC/Table_Clinical%20Characteristics%20to%20Document%20Malnutrition-White%20JV%20et%20al%2020.pdf    Height (cm): 185.4 (10-07-21 @ 17:35), 185.4 (21 @ 13:09)  Weight (kg): 82 (10-07-21 @ 17:35), 79.4 (21 @ 13:09)  BMI (kg/m2): 23.9 (10-07-21 @ 17:35), 23.1 (21 @ 13:09)    [ ]PPSV2 < or = to 30% [ ]significant weight loss  [ ]poor nutritional intake  [ ]anasarca      [ ]Artificial Nutrition      REFERRALS:   [ ]Chaplaincy  [ ]Hospice  [ ]Child Life  [ ]Social Work  [ ]Case management [ ]Holistic Therapy     Goals of Care Document:     ______________  Jairo Benjamin MD   of Geriatric and Palliative Medicine  [facility]     Please page the following number for clinical matters between the hours of 9AM and 5PM   from Monday through Friday : [phone]    After 5PM and on weekends, please page: [phone]. The Geriatric and Palliative Medicine consult service has  coverage for medical recommendations, including for symptom management needs.

## 2021-10-08 NOTE — PROGRESS NOTE ADULT - ATTENDING COMMENTS
RHC confirmed low output state and elevated filling pressures.  Decompensated overnight with escalating need for inotropes/vasopressors.   Also has recurrent anemia refractory to PRBCs transfusions.   Patient is critically ill with poor prognosis in setting of end stage HF and multiple comorbidities.  He is not a candidate for advanced therapies, recommend palliative c/s.  I have discussed extensively his prognosis with the patient and his girlfriend. They agree with the plan.   Dr. Zamudio notified.

## 2021-10-08 NOTE — CONSULT NOTE ADULT - PROBLEM SELECTOR RECOMMENDATION 3
s/p chemo followed by SCT (son) 10/2019  - Off MMF/tacro/prednisone  - As per notes, poor graft function w dropping chimerism  - Was on Vidaza - currently on hold (last in May 2021)  - f/u heme/onc recs
- given clinical decline, do not see further DDT as a possibility  - c/w symptom management

## 2021-10-09 NOTE — PROGRESS NOTE ADULT - SUBJECTIVE AND OBJECTIVE BOX
Events:    Review Of Systems:  Constitutional: denies fever, chills, Fatigue   HEENT: denies Blurred vision, Eye Pain, Headache   Respiratory: denies Cough, Wheezing , Shortness of breath  Cardiovascular: denies Chest Pain, Palpitations,  GEORGES   Gastrointestinal: denies Abdominal Pain, Diarrhea, Constipation   Genitourinary: denies Nocturia, Dysuria, Incontinence  Extremities: denies Swelling, Joint Pain  Neurologic: denies Focal deficit, Paresthesias, Syncope  Lymphatic: denies Swelling, Lymphadenopathy   Skin: denies Rash, Ecchymoses, Wounds   Psychiatry: denies Depression, Suicidal/Homicidal Ideation, anxiety  [X ] 10 point review of systems is otherwise negative except as mentioned above         Medications:  acetaminophen   Tablet .. 650 milliGRAM(s) Oral every 6 hours PRN  acyclovir   Oral Tab/Cap 400 milliGRAM(s) Oral two times a day  ALPRAZolam 0.5 milliGRAM(s) Oral at bedtime PRN  cefepime   IVPB      cefepime   IVPB 1000 milliGRAM(s) IV Intermittent every 12 hours  chlorhexidine 2% Cloths 1 Application(s) Topical daily  diphenhydrAMINE 25 milliGRAM(s) Oral every 4 hours PRN  DOBUTamine Infusion 5 MICROgram(s)/kG/Min IV Continuous <Continuous>  finasteride 5 milliGRAM(s) Oral daily  folic acid 1 milliGRAM(s) Oral daily  furosemide Infusion 2.5 mG/Hr IV Continuous <Continuous>  HYDROmorphone  Injectable 0.2 milliGRAM(s) IV Push every 4 hours PRN  influenza   Vaccine 0.5 milliLiter(s) IntraMuscular once  lidocaine 2% Jelly 3 milliLiter(s) IntraUrethral once  multivitamin 1 Tablet(s) Oral daily  norepinephrine Infusion 0.05 MICROgram(s)/kG/Min IV Continuous <Continuous>  pantoprazole    Tablet 40 milliGRAM(s) Oral before breakfast  sertraline 50 milliGRAM(s) Oral daily  tamsulosin 0.4 milliGRAM(s) Oral at bedtime  vasopressin Infusion 0.04 Unit(s)/Min IV Continuous <Continuous>  voriconazole 200 milliGRAM(s) Oral every 12 hours    PMH/PSH/FH/SH: [ ] Unchanged  Vitals:  T(C): 36.3 (10-09-21 @ 06:00), Max: 39.5 (10-08-21 @ 14:00)  HR: 96 (10-09-21 @ 06:00) (86 - 130)  BP: 102/56 (10-08-21 @ 12:45) (102/56 - 102/56)  BP(mean): 72 (10-08-21 @ 12:45) (72 - 72)  RR: 37 (10-09-21 @ 06:00) (31 - 57)  SpO2: 94% (10-09-21 @ 06:00) (84% - 100%)  Wt(kg): --  Daily     Daily Weight in k.2 (09 Oct 2021 06:00)  I&O's Summary    08 Oct 2021 07:01  -  09 Oct 2021 07:00  --------------------------------------------------------  IN: 1809 mL / OUT: 445 mL / NET: 1364 mL        Physical Exam:  Appearance: [ ] Normal [ ] NAD  Eyes: [ ] PERRL [ ] EOMI  HENT: [ ] Normal oral muscosa [ ]NC/AT  Cardiovascular: [ ] S1 [ ] S2 [ ] RRR [ ] No m/r/g [ ]No edema [ ] JVP  Procedural Access Site: [ ] No hematoma [ ] Non-tender to palpation [ ] 2+ pulse [ ] No bruit [ ] No Ecchymosis  Respiratory: [ ] Clear to auscultation bilaterally  Gastrointestinal: [ ] Soft [ ] Non-tender [ ] Non-distended [ ] BS+  Musculoskeletal: [ ] No clubbing [ ] No joint deformity   Neurologic: [ ] Non-focal  Lymphatic: [ ] No lymphadenopathy  Psychiatry: [ ] AAOx3 [ ] Mood & affect appropriate  Skin: [ ] No rashes [ ] No ecchymoses [ ] No cyanosis    10-08    138  |  101  |  23  ----------------------------<  177<H>  4.9   |  16<L>  |  1.11    Ca    8.5      08 Oct 2021 09:45  Phos  3.8     10-08  Mg     2.2     10-    TPro  4.9<L>  /  Alb  3.4  /  TBili  1.6<H>  /  DBili  x   /  AST  37  /  ALT  47<H>  /  AlkPhos  220<H>  10-08    PT/INR - ( 08 Oct 2021 09:45 )   PT: 18.9 sec;   INR: 1.61 ratio         PTT - ( 08 Oct 2021 09:45 )  PTT:30.3 sec      Serum Pro-Brain Natriuretic Peptide: 98089 pg/mL (10-08 @ 09:45)          ECG:    Echo:    Stress Testing:     Cath:    Imaging:    Interpretation of Telemetry:   HPI: 60 y/o male with EXTENSIVE pmh remarkable for chronic systolic HF ACC/AHA stage C-D, presumed NICMP LVEF <20% LVIDd 7cm, AML s/p SCT '19, factor V mutation with h/o DVT/PE, osteomyelitis with R toe amputation, h/o aspergillosis/CMV viremia, thrombocytopenia, former smoker and COLLEEN not on CPAP who presented for elective RHC. Admission labs remarkable for anemia in spite of h/o 2u PRBC transfusions a few weeks PTA, so was admitted to medicine team for anemia. He is s/p 2 unit PRBC yesterday as well as RHC which showed elevated filling pressures and low cardiac output. He was started on a Lasix gtt/dobutamine and moved to CICU. Overnight became agitated/disoriented, tachypneic, and tachycardic to the 120s. Also c/o chills (afebrile), HA, and urinary retention. Placed on BiPAP briefly, now on Venti mask. Was also hypotensive and started on pressors. Hgb dropped again this morning and he is receiving 1 unit PRBC (ordered in 1/2 units). Overall, poor prognosis and GOC discussed with him and his significant other. Decision made to cap pressors and no do further blood draws.     Events: No acute events overnight. Wife and pt are requesting pain meds. Dr. Joshi is meeting with the family ( wife and children ) to discuss further goals of care.     Review Of Systems:  Constitutional: denies fever, chills, Fatigue   HEENT: denies Blurred vision, Eye Pain, Headache   Respiratory: denies Cough, Wheezing , Shortness of breath  Cardiovascular: denies Chest Pain, Palpitations,  GEORGES   Gastrointestinal: denies Abdominal Pain, Diarrhea, Constipation   Genitourinary: denies Nocturia, Dysuria, Incontinence  Extremities: denies Swelling, Joint Pain  Neurologic: denies Focal deficit, Paresthesias, Syncope  Lymphatic: denies Swelling, Lymphadenopathy   Skin: denies Rash, Ecchymoses, Wounds   Psychiatry: denies Depression, Suicidal/Homicidal Ideation, anxiety  [X ] 10 point review of systems is otherwise negative except as mentioned above         Medications:  acetaminophen   Tablet .. 650 milliGRAM(s) Oral every 6 hours PRN  acyclovir   Oral Tab/Cap 400 milliGRAM(s) Oral two times a day  ALPRAZolam 0.5 milliGRAM(s) Oral at bedtime PRN  cefepime   IVPB      cefepime   IVPB 1000 milliGRAM(s) IV Intermittent every 12 hours  chlorhexidine 2% Cloths 1 Application(s) Topical daily  diphenhydrAMINE 25 milliGRAM(s) Oral every 4 hours PRN  DOBUTamine Infusion 5 MICROgram(s)/kG/Min IV Continuous <Continuous>  finasteride 5 milliGRAM(s) Oral daily  folic acid 1 milliGRAM(s) Oral daily  furosemide Infusion 2.5 mG/Hr IV Continuous <Continuous>  HYDROmorphone  Injectable 0.2 milliGRAM(s) IV Push every 4 hours PRN  influenza   Vaccine 0.5 milliLiter(s) IntraMuscular once  lidocaine 2% Jelly 3 milliLiter(s) IntraUrethral once  multivitamin 1 Tablet(s) Oral daily  norepinephrine Infusion 0.05 MICROgram(s)/kG/Min IV Continuous <Continuous>  pantoprazole    Tablet 40 milliGRAM(s) Oral before breakfast  sertraline 50 milliGRAM(s) Oral daily  tamsulosin 0.4 milliGRAM(s) Oral at bedtime  vasopressin Infusion 0.04 Unit(s)/Min IV Continuous <Continuous>  voriconazole 200 milliGRAM(s) Oral every 12 hours    Vitals:  T(C): 36.3 (10-09-21 @ 06:00), Max: 39.5 (10-08-21 @ 14:00)  HR: 96 (10-09-21 @ 06:00) (86 - 130)  BP: 102/56 (10-08-21 @ 12:45) (102/56 - 102/56)  BP(mean): 72 (10-08-21 @ 12:45) (72 - 72)  RR: 37 (10-09-21 @ 06:00) (31 - 57)  SpO2: 94% (10-09-21 @ 06:00) (84% - 100%)    Daily     Daily Weight in k.2 (09 Oct 2021 06:00)  I&O's Summary    08 Oct 2021 07:01  -  09 Oct 2021 07:00  --------------------------------------------------------  IN: 1809 mL / OUT: 445 mL / NET: 1364 mL    Physical Exam:  Appearance: [ X] Normal [X ] NAD  Eyes: [ X] PERRL [ X] EOMI  HENT: [X ] Normal oral muscosa [X ]NC/AT  Cardiovascular: [ X] S1 [ X] S2 [X ] RRR + edema. + JVD  Procedural Access Site: RIJ TLC PA-C C/D/I without bleeding, induration, or hematoma.   Respiratory: [ ] Clear to auscultation bilaterally  Gastrointestinal: [ ] Soft [ ] Non-tender [ ] Non-distended [ ] BS+  Musculoskeletal: [ ] No clubbing [ ] No joint deformity   Neurologic: [ ] Non-focal  Lymphatic: [ ] No lymphadenopathy  Psychiatry: [ ] AAOx3 [ ] Mood & affect appropriate  Skin: [ ] No rashes [ ] No ecchymoses [ ] No cyanosis    10-08    138  |  101  |  23  ----------------------------<  177<H>  4.9   |  16<L>  |  1.11    Ca    8.5      08 Oct 2021 09:45  Phos  3.8     10-08  Mg     2.2     10-08    TPro  4.9<L>  /  Alb  3.4  /  TBili  1.6<H>  /  DBili  x   /  AST  37  /  ALT  47<H>  /  AlkPhos  220<H>  10-08    PT/INR - ( 08 Oct 2021 09:45 )   PT: 18.9 sec;   INR: 1.61 ratio         PTT - ( 08 Oct 2021 09:45 )  PTT:30.3 sec      Serum Pro-Brain Natriuretic Peptide: 69941 pg/mL (10-08 @ 09:45)          ECG:    Echo:    Stress Testing:     Cath:    Imaging:    Interpretation of Telemetry:   HPI: 60 y/o male with EXTENSIVE pmh remarkable for chronic systolic HF ACC/AHA stage C-D, presumed NICMP LVEF <20% LVIDd 7cm, AML s/p SCT '19, factor V mutation with h/o DVT/PE, osteomyelitis with R toe amputation, h/o aspergillosis/CMV viremia, thrombocytopenia, former smoker and COLLEEN not on CPAP who presented for elective RHC. Admission labs remarkable for anemia in spite of h/o 2u PRBC transfusions a few weeks PTA, so was admitted to medicine team for anemia. He is s/p 2 unit PRBC yesterday as well as RHC which showed elevated filling pressures and low cardiac output. He was started on a Lasix gtt/dobutamine and moved to CICU. Overnight became agitated/disoriented, tachypneic, and tachycardic to the 120s. Also c/o chills (afebrile), HA, and urinary retention. Placed on BiPAP briefly, now on Venti mask. Was also hypotensive and started on pressors. Hgb dropped again this morning and he is receiving 1 unit PRBC (ordered in 1/2 units). Overall, poor prognosis and GOC discussed with him and his significant other. Decision made to cap pressors and no do further blood draws.     Events: No acute events overnight. Wife and pt are requesting pain meds. Dr. Joshi is meeting with the family ( wife and children ) to discuss further goals of care.     Review Of Systems:  Constitutional: denies fever, chills, Fatigue   HEENT: denies Blurred vision, Eye Pain, Headache   Respiratory: denies Cough, Wheezing , Shortness of breath  Cardiovascular: denies Chest Pain, Palpitations,  GEORGES   Gastrointestinal: denies Abdominal Pain, Diarrhea, Constipation   Genitourinary: denies Nocturia, Dysuria, Incontinence  Extremities: denies Swelling, Joint Pain  Neurologic: denies Focal deficit, Paresthesias, Syncope  Lymphatic: denies Swelling, Lymphadenopathy   Skin: denies Rash, Ecchymoses, Wounds   Psychiatry: denies Depression, Suicidal/Homicidal Ideation, anxiety  [X ] 10 point review of systems is otherwise negative except as mentioned above         Medications:  acetaminophen   Tablet .. 650 milliGRAM(s) Oral every 6 hours PRN  acyclovir   Oral Tab/Cap 400 milliGRAM(s) Oral two times a day  ALPRAZolam 0.5 milliGRAM(s) Oral at bedtime PRN  cefepime   IVPB      cefepime   IVPB 1000 milliGRAM(s) IV Intermittent every 12 hours  chlorhexidine 2% Cloths 1 Application(s) Topical daily  diphenhydrAMINE 25 milliGRAM(s) Oral every 4 hours PRN  DOBUTamine Infusion 5 MICROgram(s)/kG/Min IV Continuous <Continuous>  finasteride 5 milliGRAM(s) Oral daily  folic acid 1 milliGRAM(s) Oral daily  furosemide Infusion 2.5 mG/Hr IV Continuous <Continuous>  HYDROmorphone  Injectable 0.2 milliGRAM(s) IV Push every 4 hours PRN  influenza   Vaccine 0.5 milliLiter(s) IntraMuscular once  lidocaine 2% Jelly 3 milliLiter(s) IntraUrethral once  multivitamin 1 Tablet(s) Oral daily  norepinephrine Infusion 0.05 MICROgram(s)/kG/Min IV Continuous <Continuous>  pantoprazole    Tablet 40 milliGRAM(s) Oral before breakfast  sertraline 50 milliGRAM(s) Oral daily  tamsulosin 0.4 milliGRAM(s) Oral at bedtime  vasopressin Infusion 0.04 Unit(s)/Min IV Continuous <Continuous>  voriconazole 200 milliGRAM(s) Oral every 12 hours    Vitals:  T(C): 36.3 (10-09-21 @ 06:00), Max: 39.5 (10-08-21 @ 14:00)  HR: 96 (10-09-21 @ 06:00) (86 - 130)  BP: 102/56 (10-08-21 @ 12:45) (102/56 - 102/56)  BP(mean): 72 (10-08-21 @ 12:45) (72 - 72)  RR: 37 (10-09-21 @ 06:00) (31 - 57)  SpO2: 94% (10-09-21 @ 06:00) (84% - 100%)    Daily     Daily Weight in k.2 (09 Oct 2021 06:00)  I&O's Summary    08 Oct 2021 07:01  -  09 Oct 2021 07:00  --------------------------------------------------------  IN: 1809 mL / OUT: 445 mL / NET: 1364 mL    Physical Exam:  Appearance: [ X] Normal [X ] NAD  Eyes: [ X] PERRL [ X] EOMI  HENT: [X ] Normal oral muscosa [X ]NC/AT  Cardiovascular: [ X] S1 [ X] S2 [X ] RRR + edema. + JVD  Procedural Access Site: RIJ TLC PA-C C/D/I without bleeding, induration, or hematoma.   Respiratory: dyspnic, coarse breath sounds  Gastrointestinal: [x ] Soft [ x] Non-tender [x ] Non-distended   Musculoskeletal: [ x] No clubbing [ x] No joint deformity   Neurologic: [x ] Non-focal  Lymphatic: [ x] No lymphadenopathy  Psychiatry: [x ] AAOx3 [ x] Mood & affect appropriate  Skin: [x ] No rashes [x ] No ecchymoses [x ] No cyanosis    10-08    138  |  101  |  23  ----------------------------<  177<H>  4.9   |  16<L>  |  1.11    Ca    8.5      08 Oct 2021 09:45  Phos  3.8     10-08  Mg     2.2     10-08    TPro  4.9<L>  /  Alb  3.4  /  TBili  1.6<H>  /  DBili  x   /  AST  37  /  ALT  47<H>  /  AlkPhos  220<H>  10-08    PT/INR - ( 08 Oct 2021 09:45 )   PT: 18.9 sec;   INR: 1.61 ratio       PTT - ( 08 Oct 2021 09:45 )  PTT:30.3 sec    Serum Pro-Brain Natriuretic Peptide: 08181 pg/mL (10-08 @ 09:45)    ECG: < from: 12 Lead ECG (10.07.21 @ 08:24) >  Diagnosis Line NORMAL SINUS RHYTHM  POSSIBLE LEFT ATRIAL ENLARGEMENT  LEFT AXIS DEVIATION  RIGHT BUNDLE BRANCH BLOCK  INFERIOR INFARCT (CITED ON OR BEFORE 06-OCT-2021)  T WAVE ABNORMALITY, CONSIDER LATERAL ISCHEMIA    Echo: < from: Transthoracic Echocardiogram (10.08.21 @ 13:00) >  Sonographer's note: patient / family refused to allow  completion of the study.  Severe left ventricular enlargement.  Severely decreased left ventricualr systolic function.  Diffuse hypokinesis.  Right ventricular enlargement with decreased right  ventricular systolic function.  Severe tricuspid regurgitation.  A catheter is noted in the right heart.    Cath:    Imaging:    Interpretation of Telemetry:   HPI: 60 y/o male with EXTENSIVE pmh remarkable for chronic systolic HF ACC/AHA stage C-D, presumed NICMP LVEF <20% LVIDd 7cm, AML s/p SCT '19, factor V mutation with h/o DVT/PE, osteomyelitis with R toe amputation, h/o aspergillosis/CMV viremia, thrombocytopenia, former smoker and COLLEEN not on CPAP who presented for elective RHC. Admission labs remarkable for anemia in spite of h/o 2u PRBC transfusions a few weeks PTA, so was admitted to medicine team for anemia. He is s/p 2 unit PRBC yesterday as well as RHC which showed elevated filling pressures and low cardiac output. He was started on a Lasix gtt/dobutamine and moved to CICU. Overnight became agitated/disoriented, tachypneic, and tachycardic to the 120s. Also c/o chills (afebrile), HA, and urinary retention. Placed on BiPAP briefly, now on Venti mask. Was also hypotensive and started on pressors. Hgb dropped again this morning and he is receiving 1 unit PRBC (ordered in 1/2 units). Overall, poor prognosis and GOC discussed with him and his significant other. Decision made to cap pressors and no do further blood draws.     Events: No acute events overnight. Wife and pt are requesting pain meds. Dr. Joshi is meeting with the family ( wife and children ) to discuss further goals of care.     Review Of Systems:  Constitutional: denies fever, chills, Fatigue   HEENT: denies Blurred vision, Eye Pain, Headache   Respiratory: denies Cough, Wheezing , Shortness of breath  Cardiovascular: denies Chest Pain, Palpitations,  GEORGES   Gastrointestinal: denies Abdominal Pain, Diarrhea, Constipation   Genitourinary: denies Nocturia, Dysuria, Incontinence  Extremities: denies Swelling, Joint Pain  Neurologic: denies Focal deficit, Paresthesias, Syncope  Lymphatic: denies Swelling, Lymphadenopathy   Skin: denies Rash, Ecchymoses, Wounds   Psychiatry: denies Depression, Suicidal/Homicidal Ideation, anxiety  [X ] 10 point review of systems is otherwise negative except as mentioned above         Medications:  acetaminophen   Tablet .. 650 milliGRAM(s) Oral every 6 hours PRN  acyclovir   Oral Tab/Cap 400 milliGRAM(s) Oral two times a day  ALPRAZolam 0.5 milliGRAM(s) Oral at bedtime PRN  cefepime   IVPB      cefepime   IVPB 1000 milliGRAM(s) IV Intermittent every 12 hours  chlorhexidine 2% Cloths 1 Application(s) Topical daily  diphenhydrAMINE 25 milliGRAM(s) Oral every 4 hours PRN  DOBUTamine Infusion 5 MICROgram(s)/kG/Min IV Continuous <Continuous>  finasteride 5 milliGRAM(s) Oral daily  folic acid 1 milliGRAM(s) Oral daily  furosemide Infusion 2.5 mG/Hr IV Continuous <Continuous>  HYDROmorphone  Injectable 0.2 milliGRAM(s) IV Push every 4 hours PRN  influenza   Vaccine 0.5 milliLiter(s) IntraMuscular once  lidocaine 2% Jelly 3 milliLiter(s) IntraUrethral once  multivitamin 1 Tablet(s) Oral daily  norepinephrine Infusion 0.05 MICROgram(s)/kG/Min IV Continuous <Continuous>  pantoprazole    Tablet 40 milliGRAM(s) Oral before breakfast  sertraline 50 milliGRAM(s) Oral daily  tamsulosin 0.4 milliGRAM(s) Oral at bedtime  vasopressin Infusion 0.04 Unit(s)/Min IV Continuous <Continuous>  voriconazole 200 milliGRAM(s) Oral every 12 hours    Vitals:  T(C): 36.3 (10-09-21 @ 06:00), Max: 39.5 (10-08-21 @ 14:00)  HR: 96 (10-09-21 @ 06:00) (86 - 130)  BP: 102/56 (10-08-21 @ 12:45) (102/56 - 102/56)  BP(mean): 72 (10-08-21 @ 12:45) (72 - 72)  RR: 37 (10-09-21 @ 06:00) (31 - 57)  SpO2: 94% (10-09-21 @ 06:00) (84% - 100%)    Daily     Daily Weight in k.2 (09 Oct 2021 06:00)  I&O's Summary    08 Oct 2021 07:01  -  09 Oct 2021 07:00  --------------------------------------------------------  IN: 1809 mL / OUT: 445 mL / NET: 1364 mL    Physical Exam:  Appearance: [ X] Normal [X ] NAD  Eyes: [ X] PERRL [ X] EOMI  HENT: [X ] Normal oral muscosa [X ]NC/AT  Cardiovascular: [ X] S1 [ X] S2 [X ] RRR + edema. + JVD  Procedural Access Site: RIJ TLC PA-C C/D/I without bleeding, induration, or hematoma.   Respiratory: dyspnic, coarse breath sounds  Gastrointestinal: [x ] Soft [ x] Non-tender [x ] Non-distended   Musculoskeletal: [ x] No clubbing [ x] No joint deformity   Neurologic: [x ] Non-focal  Lymphatic: [ x] No lymphadenopathy  Psychiatry: [x ] AAOx3 [ x] Mood & affect appropriate  Skin: [x ] No rashes [x ] No ecchymoses [x ] No cyanosis    10-08    138  |  101  |  23  ----------------------------<  177<H>  4.9   |  16<L>  |  1.11    Ca    8.5      08 Oct 2021 09:45  Phos  3.8     10-08  Mg     2.2     10-08    TPro  4.9<L>  /  Alb  3.4  /  TBili  1.6<H>  /  DBili  x   /  AST  37  /  ALT  47<H>  /  AlkPhos  220<H>  10-08    PT/INR - ( 08 Oct 2021 09:45 )   PT: 18.9 sec;   INR: 1.61 ratio       PTT - ( 08 Oct 2021 09:45 )  PTT:30.3 sec    Serum Pro-Brain Natriuretic Peptide: 90667 pg/mL (10-08 @ 09:45)    ECG: < from: 12 Lead ECG (10.07.21 @ 08:24) >  Diagnosis Line NORMAL SINUS RHYTHM  POSSIBLE LEFT ATRIAL ENLARGEMENT  LEFT AXIS DEVIATION  RIGHT BUNDLE BRANCH BLOCK  INFERIOR INFARCT (CITED ON OR BEFORE 06-OCT-2021)  T WAVE ABNORMALITY, CONSIDER LATERAL ISCHEMIA    Echo: < from: Transthoracic Echocardiogram (10.08.21 @ 13:00) >  Sonographer's note: patient / family refused to allow  completion of the study.  Severe left ventricular enlargement.  Severely decreased left ventricualr systolic function.  Diffuse hypokinesis.  Right ventricular enlargement with decreased right  ventricular systolic function.  Severe tricuspid regurgitation.  A catheter is noted in the right heart.    Cath: < from: MR Cardiac w/wo IV Cont (19 @ 15:10) >  IMPRESSION:    1.  The calculated left ventricular ejection fraction is 24.41%.  2.  Mid wall myocardial late gadolinium enhancement at the base involving   the anteroseptal and inferoseptal wall is associated with a nonischemic   cardiomyopathy.  3.  No ventricular thrombus.    Imaging: < from: Xray Chest 1 View- PORTABLE-Urgent (Xray Chest 1 View- PORTABLE-Urgent .) (10.07.21 @ 17:02) >  IMPRESSION:    Mojave-Kasia catheter with tip in the right mid pulmonary artery.    Unchanged large layering left pleural effusion and associated partial atelectasis of left lung.    Interpretation of Telemetry: St 110

## 2021-10-09 NOTE — PROGRESS NOTE ADULT - PROBLEM SELECTOR PLAN 6
Patient to be moved to the PCU when bed becomes available  In the event of newly developing, evolving, or worsening symptoms, please contact the Palliative Medicine team via pager #[phone]. Patient to be moved to the PCU when bed becomes available. Please call Ashlyn [phone] before moving the patient to the unit  In the event of newly developing, evolving, or worsening symptoms, please contact the Palliative Medicine team via pager #[phone].

## 2021-10-09 NOTE — PROGRESS NOTE ADULT - ATTENDING COMMENTS
60 yo man with AML s/p BMT 2019, HFrEF, p/w for elective RHC found to have significant anemia s/p blood transfusion and RHC yesterday which showed cardiogenic shock, requiring initiation of inotropic support.  Overnight deteriorated requiring pressor initiation and was weaned off Dobutamine.    Yesterday found to be in worsened cardiogenic shock, Dobutamine restarted and pressors continued.  Lasix re-started as well.  Large left sided pleural effusion unable to tap given worsened hemo and low plts   Possible vasoplegia/sepsis as well given immunocompromised stated and broad spectrum abx given   Worsened anemia s/p tx recently and another 1/2 unit yesterday   Resp failure with hypoxia 3L NC sat mid 90s.    Yesterday long discussions with HCP and family and  pt was made DNR/DNI.  Decision was made to cap pressors and inotrope, Lasix 2.5 mg/hr continues, abx were given.  No escalation of care including no lab checks and no blood transfusions.  Yesterday Ashlyn wished for comfort and symptom control, pt's family asked if this can be an issue and I explained that it may cause lowered BP.  They did not want to hasten death as pt's children are coming to see him.  We all agreed that at that period of time we will hold of on pain meds, etc, but if he gets worse and more uncomfortable further discussions will take place.

## 2021-10-09 NOTE — PROGRESS NOTE ADULT - ATTENDING COMMENTS
Agree with above. d/w ACP, did not personally evaluate patient today. Agree with above plan    ______________  Jairo Benjamin MD   of Geriatric and Palliative Medicine       Please page the following number for clinical matters between the hours of 9AM and 5PM   from Monday through Friday : [phone]    After 5PM and on weekends, please page: [phone]. The Geriatric and Palliative Medicine consult service has 24/7 coverage for medical recommendations, including for symptom management needs.

## 2021-10-09 NOTE — PROGRESS NOTE ADULT - SUBJECTIVE AND OBJECTIVE BOX
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 Patient presenting here for RHC to guide management given severe heart failure. Patient found to be anemia with hemoglobin of 6s (patient's baseline seems to be 9-10). Unable to get RHC due to anemia; thus, transferred to medicine. He was recently admitted to [facility] for falls and started on midodrine to augment BP. Patient seen on 3[facility] prior to initiation of transfusions. He is very nonchalant. He has no complaints, shrugs to most of my questions.  (06 Oct 2021 13:48)      He is awake in the ICU this morning but appears somewhat agitated and confused.  He remains on pressor therapy and inotropic therapy as well as intravenous diuretic therapy.  He has had no significant dysrhythmias overnight    PAST MEDICAL & SURGICAL HISTORY:  Factor 5 Leiden mutation, heterozygous    H/O cardiomyopathy    Deep vein thrombosis (DVT)    Pulmonary embolism    COLLEEN (obstructive sleep apnea)  as per pt. he tried cpap/ bipap in the past did not like it so never used after that.    Thrombocytopenia    History of hypotension    H/O bone marrow transplant        MEDICATIONS  (STANDING):  acyclovir   Oral Tab/Cap 400 milliGRAM(s) Oral two times a day  cefepime   IVPB      cefepime   IVPB 1000 milliGRAM(s) IV Intermittent every 12 hours  chlorhexidine 2% Cloths 1 Application(s) Topical daily  DOBUTamine Infusion 5 MICROgram(s)/kG/Min (12.3 mL/Hr) IV Continuous <Continuous>  finasteride 5 milliGRAM(s) Oral daily  folic acid 1 milliGRAM(s) Oral daily  furosemide Infusion 2.5 mG/Hr (1.25 mL/Hr) IV Continuous <Continuous>  influenza   Vaccine 0.5 milliLiter(s) IntraMuscular once  lidocaine 2% Jelly 3 milliLiter(s) IntraUrethral once  multivitamin 1 Tablet(s) Oral daily  norepinephrine Infusion 0.05 MICROgram(s)/kG/Min (7.69 mL/Hr) IV Continuous <Continuous>  pantoprazole    Tablet 40 milliGRAM(s) Oral before breakfast  sertraline 50 milliGRAM(s) Oral daily  tamsulosin 0.4 milliGRAM(s) Oral at bedtime  vasopressin Infusion 0.04 Unit(s)/Min (2.4 mL/Hr) IV Continuous <Continuous>  voriconazole 200 milliGRAM(s) Oral every 12 hours    MEDICATIONS  (PRN):  acetaminophen   Tablet .. 650 milliGRAM(s) Oral every 6 hours PRN Temp greater or equal to 38C (100.4F), Mild Pain (1 - 3)  ALPRAZolam 0.5 milliGRAM(s) Oral at bedtime PRN Anxiety  diphenhydrAMINE 25 milliGRAM(s) Oral every 4 hours PRN Rash and/or Itching      Vital Signs Last 24 Hrs  T(C): 36.3 (09 Oct 2021 06:00), Max: 39.5 (08 Oct 2021 14:00)  T(F): 97.3 (09 Oct 2021 06:00), Max: 103.1 (08 Oct 2021 14:00)  HR: 101 (09 Oct 2021 09:00) (86 - 130)  BP: 102/56 (08 Oct 2021 12:45) (102/56 - 102/56)  BP(mean): 72 (08 Oct 2021 12:45) (72 - 72)  RR: 30 (09 Oct 2021 09:00) (22 - 57)  SpO2: 98% (09 Oct 2021 09:00) (84% - 98%)    I&O's Summary    08 Oct 2021 07:01  -  09 Oct 2021 07:00  --------------------------------------------------------  IN: 1809 mL / OUT: 445 mL / NET: 1364 mL    09 Oct 2021 07:01  -  09 Oct 2021 09:46  --------------------------------------------------------  IN: 324.2 mL / OUT: 50 mL / NET: 274.2 mL        PHYSICAL EXAM:    Constitutional: awake but agitated  Eyes:   Pupils round, no lesions  ENMT: no exudate or erythema  Pulmonary: scattered rhonchi  Cardiovascular: PMI not palpable RRR normal S1 and S2, no murmurs, rubs, gallops or clicks  Gastrointestinal: Bowel Sounds present, soft, nontender.   Lymph: No cervical lymphadenopathy.  Neurological:  no focal deficits  Skin: No rashes.  No cyanosis.  Psych: agitated  Ext: trc lower ext edema                                7.0    4.93  )-----------( 41       ( 08 Oct 2021 04:47 )             20.2     10-08    138  |  101  |  23  ----------------------------<  177<H>  4.9   |  16<L>  |  1.11    Ca    8.5      08 Oct 2021 09:45  Phos  3.8     10-08  Mg     2.2     10-08    TPro  4.9<L>  /  Alb  3.4  /  TBili  1.6<H>  /  DBili  x   /  AST  37  /  ALT  47<H>  /  AlkPhos  220<H>  10-08    < from: 12 Lead ECG (10.07.21 @ 08:24) >    Ventricular Rate 92 BPM    Atrial Rate 92 BPM    P-R Interval 206 ms    QRS Duration 166 ms    Q-T Interval 462 ms    QTC Calculation(Bazett) 571 ms    P Axis 37 degrees    R Axis -70 degrees    T Axis 130 degrees    Diagnosis Line NORMAL SINUS RHYTHM  POSSIBLE LEFT ATRIAL ENLARGEMENT  LEFT AXIS DEVIATION  RIGHT BUNDLE BRANCH BLOCK  INFERIOR INFARCT (CITED ON OR BEFORE 06-OCT-2021)  T WAVE ABNORMALITY, CONSIDER LATERAL ISCHEMIA  ABNORMAL ECG  WHEN COMPARED WITH ECG OF 06-OCT-2021 11:50, (UNCONFIRMED)  NO SIGNIFICANT CHANGE WAS FOUND  Confirmed by ANDRADE YA SHAHRYAR G ([id number]) on 10/7/2021 11:00:09 AM    < end of copied text >    < from: Xray Chest 1 View- PORTABLE-Routine (Xray Chest 1 View- PORTABLE-Routine in AM.) (10.08.21 @ 04:50) >    EXAM:  XR CHEST PORTABLE ROUTINE 1V                          EXAM:  XR CHEST PORTABLE URGENT 1V                            PROCEDURE DATE:  10/07/2021            INTERPRETATION:  CLINICAL INFORMATION: Draper placement.    EXAM: Frontal radiograph of the chest.    COMPARISON: Chest x-ray from one day prior.    FINDINGS:    Chest x-ray 10/7/2021 4:59 PM:    Interval placement of a Draper-Kasia catheter with tip probably in the distal right main/interlobar pulmonary artery.    There is a layering left pleural effusion with associated left lung partial atelectasis. Probable trace right effusion as well. These findings are unchanged from prior.    Heart size is without change from prior.    Chest x-ray 10/8/2021:    Interval retraction of Draper-Kasia catheter with tip in the right main pulmonary artery.    Unchanged layering left pleural effusion and associated left lung partial atelectasis.      IMPRESSION:    Draper-Kasia catheter with tip in the right mid pulmonary artery.    Unchanged large layering left pleural effusion and associated partial atelectasis of left lung.    --- End of Report ---              ENRRIQUE VANEGAS MD; Resident Radiologist  This document has been electronically signed.  JEAN PAUL XIE MD; Attending Radiologist  This documenthas been electronically signed. Oct  8 2021  3:25PM    < end of copied text >  < from: Transthoracic Echocardiogram (10.08.21 @ 13:00) >    Patient name: DI CONTRERAS  YOB: 1960   Age: 61 (M)   MR#: [id number]  Study Date: 10/8/2021  [facility]  Study quality: Technically difficult  Referring Physician: Marilyn Joshi MD  Blood Pressure: 102/56 mmHg  Height: 185 cm  Weight: 82 kg  BSA: 2.1 m2  ------------------------------------------------------------------------  PROCEDURE: Transthoracic echocardiogram with 2-D, M-Mode  and complete spectral and color flow Doppler.  INDICATION: Shock, unspecified (R57.9)  ------------------------------------------------------------------------  Dimensions:    Normal Values:  LA:     3.5    2.0 - 4.0 cm  Ao:     3.9    2.0 - 3.8 cm  SEPTUM: 0.7    0.6 - 1.2 cm  PWT:    0.8    0.6 - 1.1cm  LVIDd:  7.1    3.0 - 5.6 cm  LVIDs:  6.2    1.8 - 4.0 cm  Derived variables:  LVMI: 123 g/m2  RWT: 0.24  EF (Visual Estimate): 25 %  ------------------------------------------------------------------------  Observations:  Mitral Valve: Normal mitral valve.  Aortic Valve/Aorta: Calcified aortic valve with normal  opening.  Normal aortic root size.  Left Atrium: Moderately dilated left atrium.  Left Ventricle: Severe left ventricular enlargement.  Severely decreased left ventricualr systolic function.  Diffuse hypokinesis.  Right Heart: Normal right atrium.  Right ventricular enlargement with decreased right  ventricular systolic function.  A catheter is noted in the right heart.  Severe tricuspid regurgitation.  Normal pulmonic valve. Mildpulmonic regurgitation.  Pericardium/Pleura: Normal pericardium with no pericardial  effusion.  Left pleural effusion.  Hemodynamic: Cannot estimate pulmonary artery pressure; TR  is too severe.  ------------------------------------------------------------------------  Conclusions:  Sonographer's note: patient / family refused to allow  completion of the study.  Severe left ventricular enlargement.  Severely decreased left ventricualr systolic function.  Diffuse hypokinesis.  Right ventricular enlargement with decreased right  ventricular systolic function.  Severe tricuspid regurgitation.  A catheter is noted in the right heart.  ------------------------------------------------------------------------  Confirmed on  10/8/2021 - 18:02:56 by Ahmet Alonso MD,  BRAULIO  ------------------------------------------------------------------------    < end of copied text >

## 2021-10-09 NOTE — PROGRESS NOTE ADULT - SUBJECTIVE AND OBJECTIVE BOX
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 Patient presenting here for RHC to guide management given severe heart failure. Patient found to be anemia with hemoglobin of 6s (patient's baseline seems to be 9-10). Unable to get RHC due to anemia; thus, transferred to medicine. He was recently admitted to [facility] for falls and started on midodrine to augment BP. Patient seen on 3DSU prior to initiation of transfusions. He is very nonchalant. He has no complaints, shrugs to most of my questions.  (06 Oct 2021 13:48)      Hospital Course:    24 HOUR EVENTS:    REVIEW OF SYSTEMS:    CONSTITUTIONAL: No weakness, fevers or chills  EYES/ENT: No visual changes;  No vertigo or throat pain   NECK: No pain or stiffness  RESPIRATORY: No cough, wheezing, hemoptysis; No shortness of breath  CARDIOVASCULAR: No chest pain or palpitations  GASTROINTESTINAL: No abdominal or epigastric pain. No nausea, vomiting, or hematemesis; No diarrhea or constipation. No melena or hematochezia.  GENITOURINARY: No dysuria, frequency or hematuria  NEUROLOGICAL: No numbness or weakness  SKIN: No itching, rashes      ICU Vital Signs Last 24 Hrs  T(C): 36.4 (09 Oct 2021 12:00), Max: 37.2 (08 Oct 2021 21:00)  T(F): 97.5 (09 Oct 2021 12:00), Max: 99 (08 Oct 2021 21:00)  HR: 96 (09 Oct 2021 18:00) (95 - 106)  BP: --  BP(mean): --  ABP: 95/54 (09 Oct 2021 18:00) (75/44 - 99/60)  ABP(mean): 67 (09 Oct 2021 18:00) (55 - 74)  RR: 23 (09 Oct 2021 18:00) (22 - 40)  SpO2: 95% (09 Oct 2021 18:00) (91% - 98%)      CVP(mm Hg): 9 (10-09-21 @ 18:00) (3 - 24)  CO: 7.9 (10-07-21 @ 21:53) (4.4 - 7.9)  CI: 3.8 (10-07-21 @ 21:53) (2.1 - 3.8)  PA: 34/18 (10-09-21 @ 18:00) (22/11 - 70/40)  PA(mean): 26 (10-09-21 @ 18:00) (18 - 52)  PA(direct): --  PCWP: --  LA: --  RA: --  SVR: 567 (10-07-21 @ 21:53) (567 - 567)  SVRI: --  PVR: --  PVRI: --  I&O's Summary    08 Oct 2021 07:  -  09 Oct 2021 07:00  --------------------------------------------------------  IN: 1809 mL / OUT: 445 mL / NET: 1364 mL    09 Oct 2021 07:  -  09 Oct 2021 19:06  --------------------------------------------------------  IN: 753.1 mL / OUT: 265 mL / NET: 488.1 mL        CAPILLARY BLOOD GLUCOSE    CAPILLARY BLOOD GLUCOSE          PHYSICAL EXAM:  GENERAL: No acute distress, well-developed  HEAD:  Atraumatic, Normocephalic  EYES: EOMI, PERRLA, conjunctiva and sclera clear  NECK: Supple, no lymphadenopathy, no JVD  CHEST/LUNG: CTAB; No wheezes, rales, or rhonchi  HEART: Regular rate and rhythm. Normal S1/S2. No murmurs, rubs, or gallops  ABDOMEN: Soft, non-tender, non-distended; normal bowel sounds, no organomegaly  EXTREMITIES:  2+ peripheral pulses b/l, No clubbing, cyanosis, or edema  NEUROLOGY: A&O x 3, no focal deficits  SKIN: No rashes or lesions    ============================I/O===========================   I&O's Detail    08 Oct 2021 07:01  -  09 Oct 2021 07:00  --------------------------------------------------------  IN:    DOBUTamine: 285.2 mL    Furosemide: 14.3 mL    Furosemide: 9.1 mL    IV PiggyBack: 50 mL    IV PiggyBack: 250 mL    Norepinephrine: 585.2 mL    Oral Fluid: 410 mL    PRBCs (Packed Red Blood Cells): 150 mL    Vasopressin: 55.2 mL  Total IN: 1809 mL    OUT:    Indwelling Catheter - Urethral (mL): 445 mL    Sodium Bicarbonate: 0 mL  Total OUT: 445 mL    Total NET: 1364 mL      09 Oct 2021 07:01  -  09 Oct 2021 19:06  --------------------------------------------------------  IN:    DOBUTamine: 136.4 mL    Furosemide: 14.3 mL    IV PiggyBack: 50 mL    Norepinephrine: 286 mL    Oral Fluid: 240 mL    Vasopressin: 26.4 mL  Total IN: 753.1 mL    OUT:    Indwelling Catheter - Urethral (mL): 235 mL    Voided (mL): 30 mL  Total OUT: 265 mL    Total NET: 488.1 mL        ============================ LABS =========================                        7.0    4.93  )-----------( 41       ( 08 Oct 2021 04:47 )             20.2     1008    138  |  101  |  23  ----------------------------<  177<H>  4.9   |  16<L>  |  1.11    Ca    8.5      08 Oct 2021 09:45  Phos  3.8     10-08  Mg     2.2     10-08    TPro  4.9<L>  /  Alb  3.4  /  TBili  1.6<H>  /  DBili  x   /  AST  37  /  ALT  47<H>  /  AlkPhos  220<H>  10-08                LIVER FUNCTIONS - ( 08 Oct 2021 09:45 )  Alb: 3.4 g/dL / Pro: 4.9 g/dL / ALK PHOS: 220 U/L / ALT: 47 U/L / AST: 37 U/L / GGT: x           PT/INR - ( 08 Oct 2021 09:45 )   PT: 18.9 sec;   INR: 1.61 ratio         PTT - ( 08 Oct 2021 09:45 )  PTT:30.3 sec  ABG - ( 08 Oct 2021 11:40 )  pH, Arterial: 7.36  pH, Blood: x     /  pCO2: 22    /  pO2: 83    / HCO3: 12    / Base Excess: -11.6 /  SaO2: 98.6              Blood Gas Arterial, Lactate: 10.3 mmol/L (10-08-21 @ 11:40)  Blood Gas Arterial, Lactate: 5.4 mmol/L (10-08-21 @ 09:35)  Blood Gas Arterial, Lactate: 2.1 mmol/L (10-08-21 @ 07:54)  Lactate, Blood: 1.0 mmol/L (10-08-21 @ 04:47)  Blood Gas Arterial, Lactate: 0.9 mmol/L (10-08-21 @ 04:43)  Blood Gas Venous - Lactate: 1.0 mmol/L (10-07-21 @ 20:41)  Blood Gas Venous - Lactate: 0.9 mmol/L (10-07-21 @ 16:32)    Urinalysis Basic - ( 08 Oct 2021 04:47 )    Color: Yellow / Appearance: Clear / S.013 / pH: x  Gluc: x / Ketone: Negative  / Bili: Negative / Urobili: 2 mg/dL   Blood: x / Protein: Negative / Nitrite: Negative   Leuk Esterase: Negative / RBC: x / WBC x   Sq Epi: x / Non Sq Epi: x / Bacteria: x      ======================Micro/Rad/Cardio=================  Telemtry: Reviewed   EKG: Reviewed  CXR: Reviewed  Culture: Reviewed   Echo: Transthoracic Echocardiogram:   Patient name: DI CONTRERAS  YOB: 1960   Age: 61 (M)   MR#: [id number]  Study Date: 10/8/2021  [facility]  Study quality: Technically difficult  Referring Physician: Marilyn Joshi MD  Blood Pressure: 102/56 mmHg  Height: 185 cm  Weight: 82 kg  BSA: 2.1 m2  ------------------------------------------------------------------------  PROCEDURE: Transthoracic echocardiogram with 2-D, M-Mode  and complete spectral and color flow Doppler.  INDICATION: Shock, unspecified (R57.9)  ------------------------------------------------------------------------  Dimensions:    Normal Values:  LA:     3.5    2.0 - 4.0 cm  Ao:     3.9    2.0 - 3.8 cm  SEPTUM: 0.7    0.6 - 1.2 cm  PWT:    0.8    0.6 - 1.1cm  LVIDd:  7.1    3.0 - 5.6 cm  LVIDs:  6.2    1.8 - 4.0 cm  Derived variables:  LVMI: 123 g/m2  RWT: 0.24  EF (Visual Estimate): 25 %  ------------------------------------------------------------------------  Observations:  Mitral Valve: Normal mitral valve.  Aortic Valve/Aorta: Calcified aortic valve with normal  opening.  Normal aortic root size.  Left Atrium: Moderately dilated left atrium.  Left Ventricle: Severe left ventricular enlargement.  Severely decreased left ventricualr systolic function.  Diffuse hypokinesis.  Right Heart: Normal right atrium.  Right ventricular enlargement with decreased right  ventricular systolic function.  A catheter is noted in the right heart.  Severe tricuspid regurgitation.  Normal pulmonic valve. Mildpulmonic regurgitation.  Pericardium/Pleura: Normal pericardium with no pericardial  effusion.  Left pleural effusion.  Hemodynamic: Cannot estimate pulmonary artery pressure; TR  is too severe.  ------------------------------------------------------------------------  Conclusions:  Sonographer's note: patient / family refused to allow  completion of the study.  Severe left ventricular enlargement.  Severely decreased left ventricualr systolic function.  Diffuse hypokinesis.  Right ventricular enlargement with decreased right  ventricular systolic function.  Severe tricuspid regurgitation.  A catheter is noted in the right heart.  ------------------------------------------------------------------------  Confirmed on  10/8/2021 - 18:02:56 by Ahmet Alonso MD, FASE  ------------------------------------------------------------------------ (10-08-21 @ 13:00)    Cath:   ======================================================  PAST MEDICAL & SURGICAL HISTORY:  Factor 5 Leiden mutation, heterozygous    H/O cardiomyopathy    Deep vein thrombosis (DVT)    Pulmonary embolism    COLLEEN (obstructive sleep apnea)  as per pt. he tried cpap/ bipap in the past did not like it so never used after that.    Thrombocytopenia    History of hypotension    H/O bone marrow transplant      ====================ASSESSMENT ==============  62 yo M w/ pmh of HFrEF, AML s/p BMT p/w cardiogenic shock now patient and family leaning towards comfort care.   Hypertension   DVT     Plan:  ====================== NEUROLOGY=====================  -No acute issues   -Continue close monitoring of neuro status   -Tylenol, Hydromorphone PRN for pain   - Alprazolam,Lorazepam, PRN for anxiety   Dimenhydramine PRN rash    acetaminophen   Tablet .. 650 milliGRAM(s) Oral every 6 hours PRN Temp greater or equal to 38C (100.4F), Mild Pain (1 - 3)  ALPRAZolam 0.5 milliGRAM(s) Oral at bedtime PRN Anxiety  diphenhydrAMINE 25 milliGRAM(s) Oral every 4 hours PRN Rash and/or Itching  HYDROmorphone  Injectable 0.2 milliGRAM(s) IV Push every 2 hours PRN dyspnea  LORazepam   Injectable 0.25 milliGRAM(s) IV Push every 4 hours PRN Anxiety  sertraline 50 milliGRAM(s) Oral daily    ==================== RESPIRATORY======================  -Stable on RA, SpO2 91% - 98%  Encourage incentive spirometry, continue pulse ox monitoring, follow ABGs     ====================CARDIOVASCULAR==================  62 yo M w/ pmh of HFrEF, AML s/p BMT p/w cardiogenic shock now patient and family leaning towards comfort care.   Hypertension   DVT  Inotropic support with IV dobutamine   Pressor support with IV vasopressin and Cardene     DOBUTamine Infusion 5 MICROgram(s)/kG/Min (12.3 mL/Hr) IV Continuous <Continuous>  norepinephrine Infusion 0.05 MICROgram(s)/kG/Min (7.69 mL/Hr) IV Continuous <Continuous>  vasopressin Infusion 0.04 Unit(s)/Min (2.4 mL/Hr) IV Continuous <Continuous>    ===================HEMATOLOGIC/ONC ===================  - No acute issues     ===================== RENAL =========================  - Continue monitoring urine output  Replete lytes PRN. Keep K> 4 and Mg >2   - Diuresis with Lasix   - Hx of BPH continue with Flomax    tamsulosin 0.4 milliGRAM(s) Oral at bedtime  furosemide Infusion 2.5 mG/Hr (1.25 mL/Hr) IV Continuous <Continuous>  ==================== GASTROINTESTINAL===================  - Tolerating a regular PO diet     folic acid 1 milliGRAM(s) Oral daily  multivitamin 1 Tablet(s) Oral daily  GI prophylaxis, pantoprazole    Tablet 40 milliGRAM(s) Oral before breakfast    =======================    ENDOCRINE  =====================  - No acute issues   finasteride 5 milliGRAM(s) Oral daily    ========================INFECTIOUS DISEASE================  Afebrile, WBC within normal limits  Continue trending WBC and monitoring fever curve   -Cefepime IV empirical antibiotics   - Alcyclovir prophylaxis   - Voriconazole aspergillosis     acyclovir   Oral Tab/Cap 400 milliGRAM(s) Oral two times a day    cefepime   IVPB 1000 milliGRAM(s) IV Intermittent every 12 hours  voriconazole 200 milliGRAM(s) Oral every 12 hours      Patient requires continuous monitoring with bedside rhythm monitoring, pulse ox monitoring, and intermittent blood gas analysis. Care plan discussed with ICU care team. Patient remained critical and at risk for life threatening decompensation.  Patient seen, examined and plan discussed with CCU team during rounds.     I have personally provided ____ minutes of critical care time excluding time spent on separate procedures, in addition to initial critical care time provided by the CICU Attending, Dr. Cooper/ Madelyn/ Hakn/ Jamin/ Oanh/ Prosper .     By signing my name below, I, Martha Emery, attest that this documentation has been prepared under the direction and in the presence of Jihan Michael NP  Electronically signed: Praful Mock, 10-09-21 @ 19:06    IJihan, personally performed the services described in this documentation. all medical record entries made by the scribe were at my direction and in my presence. I have reviewed the chart and agree that the record reflects my personal performance and is accurate and complete  Electronically signed: Jihan Michael NP       DI CONTRERAS  MRN-[id number]  Patient is a 61y old  Male who presents with a chief complaint of Right Heart Cath (09 Oct 2021 14:14)    HPI:  61M with PMHx of chronic systolic heart failure (bivenctricular failure), factor V leiden (not on anticoagulation), prior aspergillosis, COLLEEN (refuses CPAP) and AML (post-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 Patient presenting here for RHC to guide management given severe heart failure. Patient found to be anemia with hemoglobin of 6s (patient's baseline seems to be 9-10). Unable to get RHC due to anemia; thus, transferred to medicine. He was recently admitted to [facility] for falls and started on midodrine to augment BP. Patient seen on 3DSU prior to initiation of transfusions. He is very nonchalant. He has no complaints, shrugs to most of my questions.  (06 Oct 2021 13:48)      Hospital Course:    24 HOUR EVENTS:  DNR/DNI, comfort care for transfer to PCU when bed available     REVIEW OF SYSTEMS:  Pt is confused and drowsy      ICU Vital Signs Last 24 Hrs  T(C): 36.4 (09 Oct 2021 12:00), Max: 37.2 (08 Oct 2021 21:00)  T(F): 97.5 (09 Oct 2021 12:00), Max: 99 (08 Oct 2021 21:00)  HR: 96 (09 Oct 2021 18:00) (95 - 106)  BP: --  BP(mean): --  ABP: 95/54 (09 Oct 2021 18:00) (75/44 - 99/60)  ABP(mean): 67 (09 Oct 2021 18:00) (55 - 74)  RR: 23 (09 Oct 2021 18:00) (22 - 40)  SpO2: 95% (09 Oct 2021 18:00) (91% - 98%)      CVP(mm Hg): 9 (10-09-21 @ 18:00) (3 - 24)  CO: 7.9 (10-07-21 @ 21:53) (4.4 - 7.9)  CI: 3.8 (10-07-21 @ 21:53) (2.1 - 3.8)  PA: 34/18 (10-09-21 @ 18:00) (/ - /40)  PA(mean): 26 (10-09-21 @ 18:00) (18 - 52)  PA(direct): --  PCWP: --  LA: --  RA: --  SVR: 567 (10-07-21 @ 21:53) (567 - 567)  SVRI: --  PVR: --  PVRI: --  I&O's Summary    08 Oct 2021 07:  -  09 Oct 2021 07:00  --------------------------------------------------------  IN: 1809 mL / OUT: 445 mL / NET: 1364 mL    09 Oct 2021 07:01  -  09 Oct 2021 19:06  --------------------------------------------------------  IN: 753.1 mL / OUT: 265 mL / NET: 488.1 mL        CAPILLARY BLOOD GLUCOSE    CAPILLARY BLOOD GLUCOSE    PHYSICAL EXAM:  GENERAL: No acute distress, well-developed  HEAD:  Atraumatic, Normocephalic  EYES: EOMI, PERRLA, conjunctiva and sclera clear  NECK: Supple, no lymphadenopathy, (+) JVD  CHEST/LUNG: (+) Rhonchi,  No wheezes, rales  HEART: Regular rate and rhythm. Normal S1/S2. No murmurs, rubs, or gallops  ABDOMEN: Soft, non-tender, non-distended; normal bowel sounds, no organomegaly  EXTREMITIES:  2+ peripheral pulses b/l, No clubbing, cyanosis, or edema  NEUROLOGY: drowsy, no focal deficits  SKIN: No rashes or lesions    ============================I/O===========================   I&O's Detail    08 Oct 2021 07:01  -  09 Oct 2021 07:00  --------------------------------------------------------  IN:    DOBUTamine: 285.2 mL    Furosemide: 14.3 mL    Furosemide: 9.1 mL    IV PiggyBack: 50 mL    IV PiggyBack: 250 mL    Norepinephrine: 585.2 mL    Oral Fluid: 410 mL    PRBCs (Packed Red Blood Cells): 150 mL    Vasopressin: 55.2 mL  Total IN: 1809 mL    OUT:    Indwelling Catheter - Urethral (mL): 445 mL    Sodium Bicarbonate: 0 mL  Total OUT: 445 mL    Total NET: 1364 mL      09 Oct 2021 07:01  -  09 Oct 2021 19:06  --------------------------------------------------------  IN:    DOBUTamine: 136.4 mL    Furosemide: 14.3 mL    IV PiggyBack: 50 mL    Norepinephrine: 286 mL    Oral Fluid: 240 mL    Vasopressin: 26.4 mL  Total IN: 753.1 mL    OUT:    Indwelling Catheter - Urethral (mL): 235 mL    Voided (mL): 30 mL  Total OUT: 265 mL    Total NET: 488.1 mL        ============================ LABS =========================                        7.0    4.93  )-----------( 41       ( 08 Oct 2021 04:47 )             20.2     10-08    138  |  101  |  23  ----------------------------<  177<H>  4.9   |  16<L>  |  1.11    Ca    8.5      08 Oct 2021 09:45  Phos  3.8     10-08  Mg     2.2     10-08    TPro  4.9<L>  /  Alb  3.4  /  TBili  1.6<H>  /  DBili  x   /  AST  37  /  ALT  47<H>  /  AlkPhos  220<H>  10-08                LIVER FUNCTIONS - ( 08 Oct 2021 09:45 )  Alb: 3.4 g/dL / Pro: 4.9 g/dL / ALK PHOS: 220 U/L / ALT: 47 U/L / AST: 37 U/L / GGT: x           PT/INR - ( 08 Oct 2021 09:45 )   PT: 18.9 sec;   INR: 1.61 ratio         PTT - ( 08 Oct 2021 09:45 )  PTT:30.3 sec  ABG - ( 08 Oct 2021 11:40 )  pH, Arterial: 7.36  pH, Blood: x     /  pCO2: 22    /  pO2: 83    / HCO3: 12    / Base Excess: -11.6 /  SaO2: 98.6              Blood Gas Arterial, Lactate: 10.3 mmol/L (10-08-21 @ 11:40)  Blood Gas Arterial, Lactate: 5.4 mmol/L (10-08-21 @ 09:35)  Blood Gas Arterial, Lactate: 2.1 mmol/L (10-08-21 @ 07:54)  Lactate, Blood: 1.0 mmol/L (10-08-21 @ 04:47)  Blood Gas Arterial, Lactate: 0.9 mmol/L (10-08-21 @ 04:43)  Blood Gas Venous - Lactate: 1.0 mmol/L (10-07-21 @ 20:41)  Blood Gas Venous - Lactate: 0.9 mmol/L (10-07-21 @ 16:32)    Urinalysis Basic - ( 08 Oct 2021 04:47 )    Color: Yellow / Appearance: Clear / S.013 / pH: x  Gluc: x / Ketone: Negative  / Bili: Negative / Urobili: 2 mg/dL   Blood: x / Protein: Negative / Nitrite: Negative   Leuk Esterase: Negative / RBC: x / WBC x   Sq Epi: x / Non Sq Epi: x / Bacteria: x      ======================Micro/Rad/Cardio=================  Telemtry: Reviewed   EKG: Reviewed  CXR: Reviewed  Culture: Reviewed   Echo: Transthoracic Echocardiogram:   Patient name: DI CONTRERAS  YOB: 1960   Age: 61 (M)   MR#: [id number]  Study Date: 10/8/2021  [facility] RD[facility]  Study quality: Technically difficult  Referring Physician: Marilyn Joshi MD  Blood Pressure: 102/56 mmHg  Height: 185 cm  Weight: 82 kg  BSA: 2.1 m2  ------------------------------------------------------------------------  PROCEDURE: Transthoracic echocardiogram with 2-D, M-Mode  and complete spectral and color flow Doppler.  INDICATION: Shock, unspecified (R57.9)  ------------------------------------------------------------------------  Dimensions:    Normal Values:  LA:     3.5    2.0 - 4.0 cm  Ao:     3.9    2.0 - 3.8 cm  SEPTUM: 0.7    0.6 - 1.2 cm  PWT:    0.8    0.6 - 1.1cm  LVIDd:  7.1    3.0 - 5.6 cm  LVIDs:  6.2    1.8 - 4.0 cm  Derived variables:  LVMI: 123 g/m2  RWT: 0.24  EF (Visual Estimate): 25 %  ------------------------------------------------------------------------  Observations:  Mitral Valve: Normal mitral valve.  Aortic Valve/Aorta: Calcified aortic valve with normal  opening.  Normal aortic root size.  Left Atrium: Moderately dilated left atrium.  Left Ventricle: Severe left ventricular enlargement.  Severely decreased left ventricualr systolic function.  Diffuse hypokinesis.  Right Heart: Normal right atrium.  Right ventricular enlargement with decreased right  ventricular systolic function.  A catheter is noted in the right heart.  Severe tricuspid regurgitation.  Normal pulmonic valve. Mildpulmonic regurgitation.  Pericardium/Pleura: Normal pericardium with no pericardial  effusion.  Left pleural effusion.  Hemodynamic: Cannot estimate pulmonary artery pressure; TR  is too severe.  ------------------------------------------------------------------------  Conclusions:  Sonographer's note: patient / family refused to allow  completion of the study.  Severe left ventricular enlargement.  Severely decreased left ventricualr systolic function.  Diffuse hypokinesis.  Right ventricular enlargement with decreased right  ventricular systolic function.  Severe tricuspid regurgitation.  A catheter is noted in the right heart.  ------------------------------------------------------------------------  Confirmed on  10/8/2021 - 18:02:56 by Ahmet Alonso MD, FASE  ------------------------------------------------------------------------ (10-08-21 @ 13:00)    Cath:   ======================================================  PAST MEDICAL & SURGICAL HISTORY:  Factor 5 Leiden mutation, heterozygous    H/O cardiomyopathy    Deep vein thrombosis (DVT)    Pulmonary embolism    COLLEEN (obstructive sleep apnea)  as per pt. he tried cpap/ bipap in the past did not like it so never used after that.    Thrombocytopenia    History of hypotension    H/O bone marrow transplant      ====================ASSESSMENT ==============  60 yo M w/ pmh of HFrEF, AML s/p BMT p/w cardiogenic shock now patient and family leaning towards comfort care.   Hypertension   DVT     Plan:  ====================== NEUROLOGY=====================  -No acute issues   -Continue close monitoring of neuro status   -Tylenol, Hydromorphone PRN for pain   - Alprazolam,Lorazepam, PRN for anxiety   Dimenhydramine PRN rash    acetaminophen   Tablet .. 650 milliGRAM(s) Oral every 6 hours PRN Temp greater or equal to 38C (100.4F), Mild Pain (1 - 3)  ALPRAZolam 0.5 milliGRAM(s) Oral at bedtime PRN Anxiety  diphenhydrAMINE 25 milliGRAM(s) Oral every 4 hours PRN Rash and/or Itching  HYDROmorphone  Injectable 0.2 milliGRAM(s) IV Push every 2 hours PRN dyspnea  LORazepam   Injectable 0.25 milliGRAM(s) IV Push every 4 hours PRN Anxiety  sertraline 50 milliGRAM(s) Oral daily    ==================== RESPIRATORY======================  Pleural effusion   -Noted large pleural effusion on US, not hemodynamically stable for thoracentesis due to low Plt  -Diuresis with lasix gtt  -Oxygen as needed      ====================CARDIOVASCULAR==================  Acute on chronic systolic heart failure   -s/p RHC with elevated filling pressures and low CI, started on lasix and  gtt  -c/w Inotropic support with IV dobutamine 2.5  -c/w vasopressin and levophed gtt, capped and no lab draws   -c/w lasix gtt for elevated filling pressures   -Poor prognosis, not a candidate for MCS  -Palliative on board, family discussion today, pt is DNR/DNI    DOBUTamine Infusion 5 MICROgram(s)/kG/Min (12.3 mL/Hr) IV Continuous <Continuous>  norepinephrine Infusion 0.05 MICROgram(s)/kG/Min (7.69 mL/Hr) IV Continuous <Continuous>  vasopressin Infusion 0.04 Unit(s)/Min (2.4 mL/Hr) IV Continuous <Continuous>    ===================HEMATOLOGIC/ONC ===================  Anemia   -s/p 2U PRBC transfusion during RHC  -no further blood draws, pt is DNR and comfort care     Thrombocytopenia   -last level 41 on 10/8   -not trending due to comfort care    ===================== RENAL =========================  - Continue monitoring urine output  Replete lytes PRN. Keep K> 4 and Mg >2   - Diuresis with Lasix   - Hx of BPH continue with Flomax    tamsulosin 0.4 milliGRAM(s) Oral at bedtime  furosemide Infusion 2.5 mG/Hr (1.25 mL/Hr) IV Continuous <Continuous>  ==================== GASTROINTESTINAL===================  Holding TF     folic acid 1 milliGRAM(s) Oral daily  multivitamin 1 Tablet(s) Oral daily  GI prophylaxis, pantoprazole    Tablet 40 milliGRAM(s) Oral before breakfast    =======================    ENDOCRINE  =====================  - No acute issues   finasteride 5 milliGRAM(s) Oral daily    ========================INFECTIOUS DISEASE================  Afebrile, WBC within normal limits  Continue trending WBC and monitoring fever curve   -Cefepime IV empirical antibiotics   - Alcyclovir prophylaxis   - Voriconazole aspergillosis     acyclovir   Oral Tab/Cap 400 milliGRAM(s) Oral two times a day    cefepime   IVPB 1000 milliGRAM(s) IV Intermittent every 12 hours  voriconazole 200 milliGRAM(s) Oral every 12 hours      Patient requires continuous monitoring with bedside rhythm monitoring, pulse ox monitoring, and intermittent blood gas analysis. Care plan discussed with ICU care team. Patient remained critical and at risk for life threatening decompensation.  Patient seen, examined and plan discussed with CCU team during rounds.     I have personally provided _30___ minutes of critical care time excluding time spent on separate procedures, in addition to initial critical care time provided by the CICU Attending, Dr. Joshi.     By signing my name below, I, Martha Emery, attest that this documentation has been prepared under the direction and in the presence of Jihan Michael NP  Electronically signed: Praful Mock, 10-09-21 @ 19:06    I, Jihan Michael, personally performed the services described in this documentation. all medical record entries made by the scribe were at my direction and in my presence. 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 Patient presenting here for RHC to guide management given severe heart failure. Patient found to be anemia with hemoglobin of 6s (patient's baseline seems to be 9-10). Unable to get RHC due to anemia; thus, transferred to medicine. He was recently admitted to [facility] for falls and started on midodrine to augment BP. Patient seen on 3DSU prior to initiation of transfusions. He is very nonchalant. He has no complaints, shrugs to most of my questions.  (06 Oct 2021 13:48)      Hospital Course:    24 HOUR EVENTS:  DNR/DNI, comfort care for transfer to PCU when bed available     REVIEW OF SYSTEMS:  Pt is confused and drowsy      ICU Vital Signs Last 24 Hrs  T(C): 36.4 (09 Oct 2021 12:00), Max: 37.2 (08 Oct 2021 21:00)  T(F): 97.5 (09 Oct 2021 12:00), Max: 99 (08 Oct 2021 21:00)  HR: 96 (09 Oct 2021 18:00) (95 - 106)  BP: --  BP(mean): --  ABP: 95/54 (09 Oct 2021 18:00) (75/44 - 99/60)  ABP(mean): 67 (09 Oct 2021 18:00) (55 - 74)  RR: 23 (09 Oct 2021 18:00) (22 - 40)  SpO2: 95% (09 Oct 2021 18:00) (91% - 98%)      CVP(mm Hg): 9 (10-09-21 @ 18:00) (3 - 24)  CO: 7.9 (10-07-21 @ 21:53) (4.4 - 7.9)  CI: 3.8 (10-07-21 @ 21:53) (2.1 - 3.8)  PA: 34/18 (10-09-21 @ 18:00) (/ - /40)  PA(mean): 26 (10-09-21 @ 18:00) (18 - 52)  PA(direct): --  PCWP: --  LA: --  RA: --  SVR: 567 (10-07-21 @ 21:53) (567 - 567)  SVRI: --  PVR: --  PVRI: --  I&O's Summary    08 Oct 2021 07:  -  09 Oct 2021 07:00  --------------------------------------------------------  IN: 1809 mL / OUT: 445 mL / NET: 1364 mL    09 Oct 2021 07:01  -  09 Oct 2021 19:06  --------------------------------------------------------  IN: 753.1 mL / OUT: 265 mL / NET: 488.1 mL        CAPILLARY BLOOD GLUCOSE    CAPILLARY BLOOD GLUCOSE    PHYSICAL EXAM:  GENERAL: No acute distress, well-developed  HEAD:  Atraumatic, Normocephalic  EYES: EOMI, PERRLA, conjunctiva and sclera clear  NECK: Supple, no lymphadenopathy, (+) JVD  CHEST/LUNG: (+) Rhonchi,  No wheezes, rales  HEART: Regular rate and rhythm. Normal S1/S2. No murmurs, rubs, or gallops  ABDOMEN: Soft, non-tender, non-distended; normal bowel sounds, no organomegaly  EXTREMITIES:  2+ peripheral pulses b/l, No clubbing, cyanosis, or edema  NEUROLOGY: drowsy, no focal deficits  SKIN: No rashes or lesions    ============================I/O===========================   I&O's Detail    08 Oct 2021 07:01  -  09 Oct 2021 07:00  --------------------------------------------------------  IN:    DOBUTamine: 285.2 mL    Furosemide: 14.3 mL    Furosemide: 9.1 mL    IV PiggyBack: 50 mL    IV PiggyBack: 250 mL    Norepinephrine: 585.2 mL    Oral Fluid: 410 mL    PRBCs (Packed Red Blood Cells): 150 mL    Vasopressin: 55.2 mL  Total IN: 1809 mL    OUT:    Indwelling Catheter - Urethral (mL): 445 mL    Sodium Bicarbonate: 0 mL  Total OUT: 445 mL    Total NET: 1364 mL      09 Oct 2021 07:01  -  09 Oct 2021 19:06  --------------------------------------------------------  IN:    DOBUTamine: 136.4 mL    Furosemide: 14.3 mL    IV PiggyBack: 50 mL    Norepinephrine: 286 mL    Oral Fluid: 240 mL    Vasopressin: 26.4 mL  Total IN: 753.1 mL    OUT:    Indwelling Catheter - Urethral (mL): 235 mL    Voided (mL): 30 mL  Total OUT: 265 mL    Total NET: 488.1 mL        ============================ LABS =========================                        7.0    4.93  )-----------( 41       ( 08 Oct 2021 04:47 )             20.2     10-08    138  |  101  |  23  ----------------------------<  177<H>  4.9   |  16<L>  |  1.11    Ca    8.5      08 Oct 2021 09:45  Phos  3.8     10-08  Mg     2.2     10-08    TPro  4.9<L>  /  Alb  3.4  /  TBili  1.6<H>  /  DBili  x   /  AST  37  /  ALT  47<H>  /  AlkPhos  220<H>  10-08                LIVER FUNCTIONS - ( 08 Oct 2021 09:45 )  Alb: 3.4 g/dL / Pro: 4.9 g/dL / ALK PHOS: 220 U/L / ALT: 47 U/L / AST: 37 U/L / GGT: x           PT/INR - ( 08 Oct 2021 09:45 )   PT: 18.9 sec;   INR: 1.61 ratio         PTT - ( 08 Oct 2021 09:45 )  PTT:30.3 sec  ABG - ( 08 Oct 2021 11:40 )  pH, Arterial: 7.36  pH, Blood: x     /  pCO2: 22    /  pO2: 83    / HCO3: 12    / Base Excess: -11.6 /  SaO2: 98.6              Blood Gas Arterial, Lactate: 10.3 mmol/L (10-08-21 @ 11:40)  Blood Gas Arterial, Lactate: 5.4 mmol/L (10-08-21 @ 09:35)  Blood Gas Arterial, Lactate: 2.1 mmol/L (10-08-21 @ 07:54)  Lactate, Blood: 1.0 mmol/L (10-08-21 @ 04:47)  Blood Gas Arterial, Lactate: 0.9 mmol/L (10-08-21 @ 04:43)  Blood Gas Venous - Lactate: 1.0 mmol/L (10-07-21 @ 20:41)  Blood Gas Venous - Lactate: 0.9 mmol/L (10-07-21 @ 16:32)    Urinalysis Basic - ( 08 Oct 2021 04:47 )    Color: Yellow / Appearance: Clear / S.013 / pH: x  Gluc: x / Ketone: Negative  / Bili: Negative / Urobili: 2 mg/dL   Blood: x / Protein: Negative / Nitrite: Negative   Leuk Esterase: Negative / RBC: x / WBC x   Sq Epi: x / Non Sq Epi: x / Bacteria: x      ======================Micro/Rad/Cardio=================  Telemtry: Reviewed   EKG: Reviewed  CXR: Reviewed  Culture: Reviewed   Echo: Transthoracic Echocardiogram:   Patient name: DI CONTRERAS  YOB: 1960   Age: 61 (M)   MR#: [id number]  Study Date: 10/8/2021  [facility] RDCS  Study quality: Technically difficult  Referring Physician: Marilyn Joshi MD  Blood Pressure: 102/56 mmHg  Height: 185 cm  Weight: 82 kg  BSA: 2.1 m2  ------------------------------------------------------------------------  PROCEDURE: Transthoracic echocardiogram with 2-D, M-Mode  and complete spectral and color flow Doppler.  INDICATION: Shock, unspecified (R57.9)  ------------------------------------------------------------------------  Dimensions:    Normal Values:  LA:     3.5    2.0 - 4.0 cm  Ao:     3.9    2.0 - 3.8 cm  SEPTUM: 0.7    0.6 - 1.2 cm  PWT:    0.8    0.6 - 1.1cm  LVIDd:  7.1    3.0 - 5.6 cm  LVIDs:  6.2    1.8 - 4.0 cm  Derived variables:  LVMI: 123 g/m2  RWT: 0.24  EF (Visual Estimate): 25 %  ------------------------------------------------------------------------  Observations:  Mitral Valve: Normal mitral valve.  Aortic Valve/Aorta: Calcified aortic valve with normal  opening.  Normal aortic root size.  Left Atrium: Moderately dilated left atrium.  Left Ventricle: Severe left ventricular enlargement.  Severely decreased left ventricualr systolic function.  Diffuse hypokinesis.  Right Heart: Normal right atrium.  Right ventricular enlargement with decreased right  ventricular systolic function.  A catheter is noted in the right heart.  Severe tricuspid regurgitation.  Normal pulmonic valve. Mildpulmonic regurgitation.  Pericardium/Pleura: Normal pericardium with no pericardial  effusion.  Left pleural effusion.  Hemodynamic: Cannot estimate pulmonary artery pressure; TR  is too severe.  ------------------------------------------------------------------------  Conclusions:  Sonographer's note: patient / family refused to allow  completion of the study.  Severe left ventricular enlargement.  Severely decreased left ventricualr systolic function.  Diffuse hypokinesis.  Right ventricular enlargement with decreased right  ventricular systolic function.  Severe tricuspid regurgitation.  A catheter is noted in the right heart.  ------------------------------------------------------------------------  Confirmed on  10/8/2021 - 18:02:56 by Ahmet Alonso MD, FASE  ------------------------------------------------------------------------ (10-08-21 @ 13:00)    Cath:   ======================================================  PAST MEDICAL & SURGICAL HISTORY:  Factor 5 Leiden mutation, heterozygous    H/O cardiomyopathy    Deep vein thrombosis (DVT)    Pulmonary embolism    COLLEEN (obstructive sleep apnea)  as per pt. he tried cpap/ bipap in the past did not like it so never used after that.    Thrombocytopenia    History of hypotension    H/O bone marrow transplant      ====================ASSESSMENT ==============  62 yo M w/ pmh of HFrEF, AML s/p BMT p/w cardiogenic shock now patient and family leaning towards comfort care.   Hypertension   DVT     Plan:  ====================== NEUROLOGY=====================  -No acute issues   -Continue close monitoring of neuro status   -Tylenol, Hydromorphone PRN for pain   - Alprazolam,Lorazepam, PRN for anxiety   Dimenhydramine PRN rash    acetaminophen   Tablet .. 650 milliGRAM(s) Oral every 6 hours PRN Temp greater or equal to 38C (100.4F), Mild Pain (1 - 3)  ALPRAZolam 0.5 milliGRAM(s) Oral at bedtime PRN Anxiety  diphenhydrAMINE 25 milliGRAM(s) Oral every 4 hours PRN Rash and/or Itching  HYDROmorphone  Injectable 0.2 milliGRAM(s) IV Push every 2 hours PRN dyspnea  LORazepam   Injectable 0.25 milliGRAM(s) IV Push every 4 hours PRN Anxiety  sertraline 50 milliGRAM(s) Oral daily    ==================== RESPIRATORY======================  Pleural effusion   -Noted large pleural effusion on US, not hemodynamically stable for thoracentesis due to low Plt  -Diuresis with lasix gtt  -Oxygen as needed      ====================CARDIOVASCULAR==================  Acute on chronic systolic heart failure   -s/p RHC with elevated filling pressures and low CI, started on lasix and  gtt  -c/w Inotropic support with IV dobutamine 2.5  -c/w vasopressin and levophed gtt, capped and no lab draws   -c/w lasix gtt for elevated filling pressures   -Poor prognosis, not a candidate for MCS  -Palliative on board, family discussion today, pt is DNR/DNI    DOBUTamine Infusion 5 MICROgram(s)/kG/Min (12.3 mL/Hr) IV Continuous <Continuous>  norepinephrine Infusion 0.05 MICROgram(s)/kG/Min (7.69 mL/Hr) IV Continuous <Continuous>  vasopressin Infusion 0.04 Unit(s)/Min (2.4 mL/Hr) IV Continuous <Continuous>    ===================HEMATOLOGIC/ONC ===================  Anemia   -s/p 2U PRBC transfusion during RHC  -no further blood draws, pt is DNR and comfort care     Thrombocytopenia   -last level 41 on 10/8   -not trending due to comfort care    ===================== RENAL =========================  - Continue monitoring urine output  Replete lytes PRN. Keep K> 4 and Mg >2   - Diuresis with Lasix   - Hx of BPH continue with Flomax    tamsulosin 0.4 milliGRAM(s) Oral at bedtime  furosemide Infusion 2.5 mG/Hr (1.25 mL/Hr) IV Continuous <Continuous>  ==================== GASTROINTESTINAL===================  Holding TF     folic acid 1 milliGRAM(s) Oral daily  multivitamin 1 Tablet(s) Oral daily  GI prophylaxis, pantoprazole    Tablet 40 milliGRAM(s) Oral before breakfast    =======================    ENDOCRINE  =====================  - No acute issues   finasteride 5 milliGRAM(s) Oral daily    ========================INFECTIOUS DISEASE================  Afebrile, WBC within normal limits  Continue trending WBC and monitoring fever curve   -Cefepime IV empirical antibiotics   - Alcyclovir prophylaxis   - Voriconazole aspergillosis     acyclovir   Oral Tab/Cap 400 milliGRAM(s) Oral two times a day    cefepime   IVPB 1000 milliGRAM(s) IV Intermittent every 12 hours  voriconazole 200 milliGRAM(s) Oral every 12 hours      Patient requires continuous monitoring with bedside rhythm monitoring, pulse ox monitoring, and intermittent blood gas analysis. Care plan discussed with ICU care team. Patient remained critical and at risk for life threatening decompensation.  Patient seen, examined and plan discussed with CCU team during rounds.     I have personally provided _30___ minutes of critical care time excluding time spent on separate procedures, in addition to initial critical care time provided by the CICU Attending, Dr. Joshi.     By signing my name below, I, Martha Emery, attest that this documentation has been prepared under the direction and in the presence of Jihan Michael NP  Electronically signed: Praful Mock, 10-09-21 @ 19:06    I, Jihan Michael, personally performed the services described in this documentation. all medical record entries made by the scribe were at my direction and in my presence. I have reviewed the chart and agree that the record reflects my personal performance and is accurate and complete  Electronically signed: YADIRA Dumont Fellow Attestation  JOSE ARMANDO/jos earmando santizo/daria 1:1, patient refusing BS checks

## 2021-10-09 NOTE — PROGRESS NOTE ADULT - ASSESSMENT
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 Admission labs remarkable for anemia in spite of h/o 2u PRBC transfusions a few weeks PTA, so was admitted to medicine team for anemia.  He received 2 units of red blood cells  - he has had NYHA Class III-IV Stage C nonischemic systolic HF  - right heart cath revealed pa sat 38 and was moved to the icu for a trial of inotropic support  - Continue intravenous dobutamine at 5 mcg/kg/min, vasopressin, and norepinephrine  - cont furosemide infusion  -mixed venous sat 24 earlier this am and will need to continue to trend  - Please continue to maintain strict I/Os, monitor daily weights, Cr, and K.  - not a candidate for heart transplant or LVAD at this time given his comorbidities  -apparent failure of bone marrow transplant  -onc followup to prognosis and goals of care  - transfusion dependent  - Tx PRBCs in order to maintain H/H >8/24  - monitor platelets  - resume toprol when able from the perspective of inotropic support and bp  - bp limits HF meds overall   - Further cardiac workup will depend on clinical course.   - All other workup per primary team. Will followup.     Upon my evaluation, this patient is at high risk for imminent or life threatening deterioration due to cardiogenic shock, hypotension,  and other active medical issues which require my direct attention, intervention, and personal management.  I have personally spent >35 minutes  of critical care time exclusive of time spent on separate billing procedures. This includes review of laboratory data, radiology results, discussion with primary team\patient, and monitoring for potential decompensation Interventions were performed as documented above.

## 2021-10-09 NOTE — PROGRESS NOTE ADULT - ASSESSMENT
62 yo M w/ pmh of HFrEF, AML s/p BMT p/w cardiogenic shock now patient and family leaning towards comfort care.     Neuro  - A and O x 4  - Dilaudid .2 prn for dyspnea  - Ativan prn for anxiety    Resp  - On 3 L NC     AdHF  - On Levo 0.17, Vaso 0.04 , Lasix 2.5 , pressors capped  - No blood draws per family  - , overall positive. CVP 12   60 yo M w/ pmh of HFrEF, AML s/p BMT p/w cardiogenic shock now patient and family leaning towards comfort care.     Neuro  - A and O x 4  - Dilaudid .2 prn for dyspnea  - Ativan prn for anxiety    Resp  - On 3 L NC     AdHF  - On Levo 0.17, Vaso 0.04 , Lasix 2.5 , pressors capped  - No blood draws per family  - , overall positive. CVP 12    GI  -Pleasure feeds  - Continue PPI      - Continue flomax  - Padilla in place for comfort    Renal  - UO low, concern for rising Cr  - No further Vanco at this time    ID  - Continue Cefepime for concern of sepsis  - Continue acyclovir     Heme  - compression device on, no heparin due to low platelets    Endo  - Glucose monitoring q 8 hours    Meeting was held today with HCP and children. They wish to start pain meds for comfort and tx to a palliative unit. Pt is listed pending bed availability.    Bere jordan, DNP

## 2021-10-09 NOTE — PROGRESS NOTE ADULT - SUBJECTIVE AND OBJECTIVE BOX
GAP TEAM PALLIATIVE CARE UNIT PROGRESS NOTE:      [  ] Patient on hospice program.    INDICATION FOR PALLIATIVE CARE UNIT SERVICES:    INTERVAL HPI/OVERNIGHT EVENTS: Chart reviewed. The patient is seen and examined at the bedside. The patient required PRN Dilaudid 0.2mg IV X1 prior to my assessment for dyspnea. Family at the bedside reports that he is doing much better after receiving the medication.      DNR on chart:   Allergies    No Known Allergies    Intolerances    MEDICATIONS  (STANDING):  acyclovir   Oral Tab/Cap 400 milliGRAM(s) Oral two times a day  cefepime   IVPB      cefepime   IVPB 1000 milliGRAM(s) IV Intermittent every 12 hours  chlorhexidine 2% Cloths 1 Application(s) Topical daily  DOBUTamine Infusion 5 MICROgram(s)/kG/Min (12.3 mL/Hr) IV Continuous <Continuous>  finasteride 5 milliGRAM(s) Oral daily  folic acid 1 milliGRAM(s) Oral daily  furosemide Infusion 2.5 mG/Hr (1.25 mL/Hr) IV Continuous <Continuous>  influenza   Vaccine 0.5 milliLiter(s) IntraMuscular once  lidocaine 2% Jelly 3 milliLiter(s) IntraUrethral once  multivitamin 1 Tablet(s) Oral daily  norepinephrine Infusion 0.05 MICROgram(s)/kG/Min (7.69 mL/Hr) IV Continuous <Continuous>  pantoprazole    Tablet 40 milliGRAM(s) Oral before breakfast  sertraline 50 milliGRAM(s) Oral daily  tamsulosin 0.4 milliGRAM(s) Oral at bedtime  vasopressin Infusion 0.04 Unit(s)/Min (2.4 mL/Hr) IV Continuous <Continuous>  voriconazole 200 milliGRAM(s) Oral every 12 hours    MEDICATIONS  (PRN):  acetaminophen   Tablet .. 650 milliGRAM(s) Oral every 6 hours PRN Temp greater or equal to 38C (100.4F), Mild Pain (1 - 3)  ALPRAZolam 0.5 milliGRAM(s) Oral at bedtime PRN Anxiety  diphenhydrAMINE 25 milliGRAM(s) Oral every 4 hours PRN Rash and/or Itching  HYDROmorphone  Injectable 0.2 milliGRAM(s) IV Push every 2 hours PRN dyspnea  LORazepam   Injectable 0.25 milliGRAM(s) IV Push every 4 hours PRN Anxiety    ITEMS UNCHECKED ARE NOT PRESENT    PRESENT SYMPTOMS: [X ]Unable to obtain due to poor mentation   Source if other than patient:  [ ]Family   [ X]Team     Pain: [ ] yes [ X] no see pain ad score   QOL impact -   Location -                    Aggravating factors -  Quality -  Radiation -  Timing-  Severity (0-10 scale):  Minimal acceptable level (0-10 scale):     Dyspnea:                           [ ]Mild [ ]Moderate [ ]Severe  Anxiety:                             [ ]Mild [ ]Moderate [ ]Severe  Fatigue:                             [ ]Mild [ ]Moderate [ ]Severe  Nausea:                             [ ]Mild [ ]Moderate [ ]Severe  Loss of appetite:              [ ]Mild [ ]Moderate [ ]Severe  Constipation:                    [ ]Mild [ ]Moderate [ ]Severe    PAINAD Score: 0 during my assessment. Patient sleeping    [email or web address] (Ctrl +  left click to view)  		  Other Symptoms:  [X ]All other review of systems negative     Palliative Performance Status Version 2:  30-40%         http://[email or web address]/files/news/[email or web address]_performance_[email or web address]_[email or web address].pdf  PHYSICAL EXAM:  Vital Signs Last 24 Hrs  T(C): 36.4 (09 Oct 2021 12:00), Max: 37.8 (08 Oct 2021 16:00)  T(F): 97.5 (09 Oct 2021 12:00), Max: 100 (08 Oct 2021 16:00)  HR: 99 (09 Oct 2021 14:00) (95 - 113)  BP: --  BP(mean): --  RR: 29 (09 Oct 2021 14:00) (22 - 46)  SpO2: 93% (09 Oct 2021 14:00) (91% - 98%) I&O's Summary    08 Oct 2021 07:01  -  09 Oct 2021 07:00  --------------------------------------------------------  IN: 1809 mL / OUT: 445 mL / NET: 1364 mL    09 Oct 2021 07:01  -  09 Oct 2021 14:14  --------------------------------------------------------  IN: 492.6 mL / OUT: 130 mL / NET: 362.6 mL    GENERAL:  [ ]Alert  [ ]Oriented x   [ X]Lethargic  [ ]Cachexia  [ ]Unarousable  [ ]Verbal  [ ]Non-Verbal  Behavioral:   [ ] Anxiety  [ ] Delirium [ ] Agitation [ ] Other  HEENT:  [ ]Normal   [ ]Dry mouth   [ ]ET Tube/Trach  [ ]Oral lesions  PULMONARY:   [X ]Clear [ ]Tachypnea  [ ]Audible excessive secretions   [ ]Rhonchi        [ ]Right [ ]Left [ ]Bilateral  [ ]Crackles        [ ]Right [ ]Left [ ]Bilateral  [ ]Wheezing     [ ]Right [ ]Left [ ]Bilateral  [ ]Diminshed BS [ ]Right [ ]Left [ ]Bilateral    CARDIOVASCULAR:    [ X]Regular [ ]Irregular [ ]Tachy  [ ]Maximino [ ]Murmur [ ]Other  GASTROINTESTINAL:  [X ]Soft  [ ]Distended   [X ]+BS  [X ]Non tender [ ]Tender  [ ]PEG [ ]OGT/ NGT   Last BM: 10/8/21  GENITOURINARY:  [ ]Normal [ X] Incontinent   [ ]Oliguria/Anuria   [ X]Padilla  MUSCULOSKELETAL:   [ ]Normal   [ X]Weakness  [ ]Bed/Wheelchair bound [ ]Edema  NEUROLOGIC:   [ ]No focal deficits  [ ] Cognitive impairment  [ ] Dysphagia [ ]Dysarthria [ ] Paresis [ ]Other   SKIN: Moisture associated dermatitis   [ ]Normal  [ ]Rash     [ ]Pressure ulcer(s)  [ ]y [ X]n  Present on admission      CRITICAL CARE:  [ ] Shock Present  [ ]Septic [ ]Cardiogenic [ ]Neurologic [ ]Hypovolemic  [ ]  Vasopressors [ ]  Inotropes   [ ] Respiratory failure present [ ] Mechanical Ventilation [ ] Non-invasive ventilatory support [ ] High-Flow  [ ] Acute  [ ] Chronic [ ] Hypoxic  [ ] Hypercarbic [ ] Other  [ ] Other organ failure     LABS:                        7.0    4.93  )-----------( 41       ( 08 Oct 2021 04:47 )             20.2   10-08    138  |  101  |  23  ----------------------------<  177<H>  4.9   |  16<L>  |  1.11    Ca    8.5      08 Oct 2021 09:45  Phos  3.8     10-  Mg     2.2     10-    TPro  4.9<L>  /  Alb  3.4  /  TBili  1.6<H>  /  DBili  x   /  AST  37  /  ALT  47<H>  /  AlkPhos  220<H>  10-08  PT/INR - ( 08 Oct 2021 09:45 )   PT: 18.9 sec;   INR: 1.61 ratio         PTT - ( 08 Oct 2021 09:45 )  PTT:30.3 sec    Urinalysis Basic - ( 08 Oct 2021 04:47 )    Color: Yellow / Appearance: Clear / S.013 / pH: x  Gluc: x / Ketone: Negative  / Bili: Negative / Urobili: 2 mg/dL   Blood: x / Protein: Negative / Nitrite: Negative   Leuk Esterase: Negative / RBC: x / WBC x   Sq Epi: x / Non Sq Epi: x / Bacteria: x      RADIOLOGY & ADDITIONAL STUDIES:    PROTEIN CALORIE MALNUTRITION: [ ] mild [ ] moderate [ ] severe  [ ] underweight [ ] morbid obesity    https://www.andeal.org/vault/2440/web/files/ONC/Table_Clinical%20Characteristics%20to%20Document%20Malnutrition-White%20JV%20et%20al%504121.pdf    Height (cm): 185.4 (10-07-21 @ 17:35), 185.4 (21 @ 13:09)  Weight (kg): 82 (10-07-21 @ 17:35), 79.4 (21 @ 13:09)  BMI (kg/m2): 23.9 (10-07-21 @ 17:35), 23.1 (21 @ 13:09)    [ ] PPSV2 < or = 30% [ ] significant weight loss [ ] poor nutritional intake [ ] anasarca   Artificial Nutrition [ ]     REFERRALS:   [ ]Chaplaincy  [ ] Hospice  [ ]Child Life  [X]Social Work  [ ]Case management [ ]Holistic Therapy [ ] Physical Therapy [ ] Dietary   Goals of Care Document:

## 2021-10-10 NOTE — PROGRESS NOTE ADULT - SUBJECTIVE AND OBJECTIVE BOX
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 Patient presenting here for RHC to guide management given severe heart failure. Patient found to be anemia with hemoglobin of 6s (patient's baseline seems to be 9-10). Unable to get RHC due to anemia; thus, transferred to medicine. He was recently admitted to [facility] for falls and started on midodrine to augment BP. Patient seen on 3DSU prior to initiation of transfusions. He is very nonchalant. He has no complaints, shrugs to most of my questions.  (06 Oct 2021 13:48)      Hospital Course:  10/9: DNR/DNI and comfort care     24 HOUR EVENTS:  Family would like to withdraw care tomorrow in the AM     REVIEW OF SYSTEMS:  Drowsy and confused       ICU Vital Signs Last 24 Hrs  T(C): 37 (10 Oct 2021 20:00), Max: 37.1 (10 Oct 2021 03:00)  T(F): 98.6 (10 Oct 2021 20:00), Max: 98.8 (10 Oct 2021 03:00)  HR: 108 (10 Oct 2021 21:00) (100 - 117)  BP: --  BP(mean): --  ABP: 102/63 (10 Oct 2021 21:00) (89/48 - 105/68)  ABP(mean): 77 (10 Oct 2021 21:00) (63 - 89)  RR: 30 (10 Oct 2021 21:00) (18 - 65)  SpO2: 94% (10 Oct 2021 21:00) (78% - 97%)      CVP(mm Hg): 68 (10-10-21 @ 07:15) (9 - 68)  CO: --  CI: --  PA: 33/15 (10-09-21 @ 23:00) (27/12 - 41/24)  PA(mean): 25 (10-09-21 @ 23:00) (21 - 34)  PA(direct): --  PCWP: --  LA: --  RA: --  SVR: --  SVRI: --  PVR: --  PVRI: --    POCT Blood Glucose.: 155 mg/dL (10-10-21 @ 08:00)    CAPILLARY BLOOD GLUCOSE      POCT Blood Glucose.: 155 mg/dL (10 Oct 2021 08:00)      PHYSICAL EXAM:  GENERAL: No acute distress, well-developed  HEAD:  Atraumatic, Normocephalic  EYES: EOMI, PERRLA, conjunctiva and sclera clear  NECK: Supple, no lymphadenopathy, (+) JVD  CHEST/LUNG: (+) Rhonchi,  No wheezes, rales  HEART: Regular rate and rhythm. Normal S1/S2. No murmurs, rubs, or gallops  ABDOMEN: Soft, non-tender, non-distended; normal bowel sounds, no organomegaly  EXTREMITIES:  2+ peripheral pulses b/l, No clubbing, cyanosis, or edema  NEUROLOGY: Confused and drowsy.   SKIN: No rashes or lesions    ============================I/O===========================   I&O's Detail    09 Oct 2021 07:  -  10 Oct 2021 07:00  --------------------------------------------------------  IN:    DOBUTamine: 296.4 mL    Furosemide: 31.2 mL    IV PiggyBack: 55 mL    Norepinephrine: 625.2 mL    Oral Fluid: 240 mL    Vasopressin: 57.6 mL  Total IN: 1305.4 mL    OUT:    Indwelling Catheter - Urethral (mL): 670 mL    Voided (mL): 30 mL  Total OUT: 700 mL    Total NET: 605.4 mL      10 Oct 2021 07:01  -  10 Oct 2021 21:27  --------------------------------------------------------  IN:    DOBUTamine: 172.2 mL    Furosemide: 18.2 mL    IV PiggyBack: 345 mL    Norepinephrine: 365.4 mL    Oral Fluid: 60 mL    Vasopressin: 33.6 mL  Total IN: 994.4 mL    OUT:    Indwelling Catheter - Urethral (mL): 575 mL  Total OUT: 575 mL    Total NET: 419.4 mL        ============================ LABS =========================    10-10    136  |  101  |  34<H>  ----------------------------<  156<H>  4.0   |  20<L>  |  1.13    Ca    8.5      10 Oct 2021 16:50        ======================Micro/Rad/Cardio=================  Telemtry: Reviewed   EKG: Reviewed  CXR: Reviewed  Culture: Reviewed   Echo: Transthoracic Echocardiogram:   Patient name: DI CONTRERAS  YOB: 1960   Age: 61 (M)   MR#: [id number]  Study Date: 10/8/2021  [facility] RDCS  Study quality: Technically difficult  Referring Physician: Marilyn Joshi MD  Blood Pressure: 102/56 mmHg  Height: 185 cm  Weight: 82 kg  BSA: 2.1 m2  ------------------------------------------------------------------------  PROCEDURE: Transthoracic echocardiogram with 2-D, M-Mode  and complete spectral and color flow Doppler.  INDICATION: Shock, unspecified (R57.9)  ------------------------------------------------------------------------  Dimensions:    Normal Values:  LA:     3.5    2.0 - 4.0 cm  Ao:     3.9    2.0 - 3.8 cm  SEPTUM: 0.7    0.6 - 1.2 cm  PWT:    0.8    0.6 - 1.1cm  LVIDd:  7.1    3.0 - 5.6 cm  LVIDs:  6.2    1.8 - 4.0 cm  Derived variables:  LVMI: 123 g/m2  RWT: 0.24  EF (Visual Estimate): 25 %  ------------------------------------------------------------------------  Observations:  Mitral Valve: Normal mitral valve.  Aortic Valve/Aorta: Calcified aortic valve with normal  opening.  Normal aortic root size.  Left Atrium: Moderately dilated left atrium.  Left Ventricle: Severe left ventricular enlargement.  Severely decreased left ventricualr systolic function.  Diffuse hypokinesis.  Right Heart: Normal right atrium.  Right ventricular enlargement with decreased right  ventricular systolic function.  A catheter is noted in the right heart.  Severe tricuspid regurgitation.  Normal pulmonic valve. Mildpulmonic regurgitation.  Pericardium/Pleura: Normal pericardium with no pericardial  effusion.  Left pleural effusion.  Hemodynamic: Cannot estimate pulmonary artery pressure; TR  is too severe.  ------------------------------------------------------------------------  Conclusions:  Sonographer's note: patient / family refused to allow  completion of the study.  Severe left ventricular enlargement.  Severely decreased left ventricualr systolic function.  Diffuse hypokinesis.  Right ventricular enlargement with decreased right  ventricular systolic function.  Severe tricuspid regurgitation.  A catheter is noted in the right heart.  ------------------------------------------------------------------------  Confirmed on  10/8/2021 - 18:02:56 by Ahmet Alonso MD, FASE  ------------------------------------------------------------------------ (10-08-21 @ 13:00)    Cath:   ======================================================  PAST MEDICAL & SURGICAL HISTORY:  Factor 5 Leiden mutation, heterozygous    H/O cardiomyopathy    Deep vein thrombosis (DVT)    Pulmonary embolism    COLLEEN (obstructive sleep apnea)  as per pt. he tried cpap/ bipap in the past did not like it so never used after that.    Thrombocytopenia    History of hypotension    H/O bone marrow transplant      ====================ASSESSMENT ==============            Plan:  ====================CARDIOVASCULAR==================  Acute on chronic systolic heart failure   -s/p RHC with elevated filling pressures and low CI, started on lasix and  gtt  -c/w Inotropic support with IV dobutamine 2.5  -c/w vasopressin and levophed gtt, capped and no lab draws   -c/w lasix gtt for elevated filling pressures   -Poor prognosis, not a candidate for MCS  -Palliative on board, family discussion today, pt is DNR/DNI    DOBUTamine Infusion 5 MICROgram(s)/kG/Min (12.3 mL/Hr) IV Continuous <Continuous>  furosemide Infusion 2.5 mG/Hr (1.25 mL/Hr) IV Continuous <Continuous>  norepinephrine Infusion 0.05 MICROgram(s)/kG/Min (7.69 mL/Hr) IV Continuous <Continuous>  tamsulosin 0.4 milliGRAM(s) Oral at bedtime      ====================== NEUROLOGY=====================  Pain management   -Continue close monitoring of neuro status   -Tylenol, Hydromorphone PRN for pain   - Alprazolam,Lorazepam, PRN for anxiety   -Dimenhydramine PRN rash    acetaminophen   Tablet .. 650 milliGRAM(s) Oral every 6 hours PRN Temp greater or equal to 38C (100.4F), Mild Pain (1 - 3)  ALPRAZolam 0.5 milliGRAM(s) Oral at bedtime PRN Anxiety  HYDROmorphone  Injectable 0.2 milliGRAM(s) IV Push every 2 hours PRN dyspnea  LORazepam   Injectable 0.25 milliGRAM(s) IV Push every 4 hours PRN Anxiety  sertraline 50 milliGRAM(s) Oral daily    ==================== RESPIRATORY======================  Pleural effusion   -Noted large pleural effusion on US, not hemodynamically stable for thoracentesis due to low Plt  -Diuresis with lasix gtt  -Oxygen as needed          ===================== RENAL =========================  - Continue monitoring urine output  Replete lytes PRN. Keep K> 4 and Mg >2   - Diuresis with Lasix   - Hx of BPH continue with Flomax    10-09-21 @ 07:01  -  10-10-21 @ 07:00  --------------------------------------------------------  IN: 1305.4 mL / OUT: 700 mL / NET: 605.4 mL    10-10-21 @ 07:01  -  10-10-21 @ 21:27  --------------------------------------------------------  IN: 994.4 mL / OUT: 575 mL / NET: 419.4 mL      Renal Replacement Therapy:  [ ] CRRT      [ ] IHD, Last Session:    Fluid removal:     [ ] Diuretic therapy, Regimen:       ==================== GASTROINTESTINAL===================  Holding TF     Last BM:   Indication for Stress Ulcer Prophylaxis, [ ] Yes    [ ] No   If Yes, Medication:     folic acid 1 milliGRAM(s) Oral daily  multivitamin 1 Tablet(s) Oral daily  pantoprazole    Tablet 40 milliGRAM(s) Oral before breakfast    ========================INFECTIOUS DISEASE================  Afebrile, WBC within normal limits  Continue trending WBC and monitoring fever curve   -Cefepime IV empirical antibiotics   - Alcyclovir prophylaxis   - Voriconazole aspergillosis     T(C): 37 (10-10-21 @ 20:00), Max: 37.1 (10-10-21 @ 03:00)  WBC Count: 4.93 K/uL (10-08-21 @ 04:47)  WBC Count: 3.79 K/uL (10-07-21 @ 21:53)      Culture - Blood (collected 10-08-21 @ 06:23)  Source: .Blood Blood-Catheter  Gram Stain (10-08-21 @ 18:49):    Aerobic and Anaerobic Bottle: Gram Positive Cocci in Clusters  Final Report (10-10-21 @ 16:28):    Growth in aerobic and anaerobic bottles: Staphylococcus aureus    See previous culture [id number]    Culture - Blood (collected 10-08-21 @ 06:23)  Source: .Blood Blood-Peripheral  Gram Stain (10-08-21 @ 22:49):    Growth in aerobic bottle: Gram Positive Cocci in Clusters    Growth in anaerobic bottle: Gram Positive Cocci in Clusters  Final Report (10-10-21 @ 16:27):    Growth in aerobic and anaerobic bottles: Staphylococcus aureus    ***Blood Panel PCR results on this specimen are available    approximately 3 hours after the Gram stain result.***    Gram stain, PCR, and/or culture results may not always    correspond dueto difference in methodologies.    ************************************************************    This PCR assay was performed by multiplex PCR. This    Assay tests for 66 bacterial and resistance gene targets.    Please refer to the [facility] test directory    at [email or web address] for details.  Organism: Blood Culture PCR  Staphylococcus aureus (10-10-21 @ 16:27)  Organism: Staphylococcus aureus (10-10-21 @ 16:27)      -  Ampicillin/Sulbactam: S <=8/4      -  Cefazolin: S <=4      -  Clindamycin: R <=0.25 This isolate is presumed to be clindamycin resistant based on detection of inducible resistance. Clindamycin may still be effective in some patients.      -  Erythromycin: R >4      -  Gentamicin: S <=1 Should not be used as monotherapy      -  Oxacillin: S <=0.25      -  RIF- Rifampin: S <=1 Should not be used as monotherapy      -  Tetra/Doxy: S <=1      -  Trimethoprim/Sulfamethoxazole: S <=0.5/9.5      -  Vancomycin: S 1      Method Type: CEE  Organism: Blood Culture PCR (10-10-21 @ 16:27)      -  Staphylococcus aureus: Detec Any isolate of Staphylococcus aureus from a blood culture is NOT considered a contaminant.      Method Type: PCR        Current Antibiotics with start date:     acyclovir   Oral Tab/Cap 400 milliGRAM(s) Oral two times a day  ceFAZolin   IVPB 1000 milliGRAM(s) IV Intermittent every 12 hours  voriconazole 200 milliGRAM(s) Oral every 12 hours    ===================HEMATOLOGIC/ONC ===================  Anemia   -s/p 2U PRBC transfusion during RHC  -no further blood draws, pt is DNR and comfort care     Thrombocytopenia   -last level 41 on 10/8   -not trending due to comfort care    Hemoglobin: 7.0 g/dL (10-08-21 @ 04:47)  Hemoglobin: 7.6 g/dL (10-07-21 @ 21:53)    Platelet Count - Automated: 41 K/uL (10-08-21 @ 04:47)  Platelet Count - Automated: 47 K/uL (10-07-21 @ 21:53)    Chemical VTE Prophylaxis:  [ ] Lovenox    [ ] SQH   [ ]NA  Systemic Anticogaulation:  [ ] Yes    [ ] No,  If Yes, Medication:       =======================    ENDOCRINE  =====================  No acute issues     POCT Blood Glucose.: 155 mg/dL (10-10-21 @ 08:00)        finasteride 5 milliGRAM(s) Oral daily  vasopressin Infusion 0.04 Unit(s)/Min (2.4 mL/Hr) IV Continuous <Continuous>      Patient requires continuous monitoring with bedside rhythm monitoring, pulse ox monitoring, and intermittent blood gas analysis. Care plan discussed with ICU care team. Patient remained critical and at risk for life threatening decompensation.  Patient seen, examined and plan discussed with CCU team during rounds.       I have personally provided __30__ minutes of critical care time excluding time spent on separate procedures, in addition to initial critical care time provided by the CICU Attending, Dr. Joshi.       Universal Safety Interventions

## 2021-10-10 NOTE — PROGRESS NOTE ADULT - SUBJECTIVE AND OBJECTIVE BOX
Events:    Review Of Systems:  Constitutional: denies fever, chills, Fatigue   HEENT: denies Blurred vision, Eye Pain, Headache   Respiratory: denies Cough, Wheezing , Shortness of breath  Cardiovascular: denies Chest Pain, Palpitations,  GEORGES   Gastrointestinal: denies Abdominal Pain, Diarrhea, Constipation   Genitourinary: denies Nocturia, Dysuria, Incontinence  Extremities: denies Swelling, Joint Pain  Neurologic: denies Focal deficit, Paresthesias, Syncope  Lymphatic: denies Swelling, Lymphadenopathy   Skin: denies Rash, Ecchymoses, Wounds   Psychiatry: denies Depression, Suicidal/Homicidal Ideation, anxiety  [X ] 10 point review of systems is otherwise negative except as mentioned above         Medications:  acetaminophen   Tablet .. 650 milliGRAM(s) Oral every 6 hours PRN  acyclovir   Oral Tab/Cap 400 milliGRAM(s) Oral two times a day  ALPRAZolam 0.5 milliGRAM(s) Oral at bedtime PRN  cefepime   IVPB      cefepime   IVPB 1000 milliGRAM(s) IV Intermittent every 12 hours  chlorhexidine 2% Cloths 1 Application(s) Topical daily  diphenhydrAMINE 25 milliGRAM(s) Oral every 4 hours PRN  DOBUTamine Infusion 5 MICROgram(s)/kG/Min IV Continuous <Continuous>  finasteride 5 milliGRAM(s) Oral daily  folic acid 1 milliGRAM(s) Oral daily  furosemide Infusion 2.5 mG/Hr IV Continuous <Continuous>  HYDROmorphone  Injectable 0.2 milliGRAM(s) IV Push every 2 hours PRN  influenza   Vaccine 0.5 milliLiter(s) IntraMuscular once  lidocaine 2% Jelly 3 milliLiter(s) IntraUrethral once  LORazepam   Injectable 0.25 milliGRAM(s) IV Push every 4 hours PRN  multivitamin 1 Tablet(s) Oral daily  norepinephrine Infusion 0.05 MICROgram(s)/kG/Min IV Continuous <Continuous>  pantoprazole    Tablet 40 milliGRAM(s) Oral before breakfast  sertraline 50 milliGRAM(s) Oral daily  tamsulosin 0.4 milliGRAM(s) Oral at bedtime  vasopressin Infusion 0.04 Unit(s)/Min IV Continuous <Continuous>  voriconazole 200 milliGRAM(s) Oral every 12 hours    PMH/PSH/FH/SH: [ ] Unchanged  Vitals:  T(C): 36.9 (10-10-21 @ 07:00), Max: 37.1 (10-10-21 @ 03:00)  HR: 103 (10-10-21 @ 06:00) (95 - 117)  BP: --  BP(mean): --  RR: 20 (10-10-21 @ 06:00) (20 - 52)  SpO2: 93% (10-10-21 @ 06:00) (78% - 98%)  Wt(kg): --  Daily     Daily Weight in k.1 (10 Oct 2021 00:00)  I&O's Summary    08 Oct 2021 07:01  -  09 Oct 2021 07:00  --------------------------------------------------------  IN: 1809 mL / OUT: 445 mL / NET: 1364 mL    09 Oct 2021 07:01  -  10 Oct 2021 06:49  --------------------------------------------------------  IN: 1258.3 mL / OUT: 605 mL / NET: 653.3 mL        Physical Exam:  Appearance: [ ] Normal [ ] NAD  Eyes: [ ] PERRL [ ] EOMI  HENT: [ ] Normal oral muscosa [ ]NC/AT  Cardiovascular: [ ] S1 [ ] S2 [ ] RRR [ ] No m/r/g [ ]No edema [ ] JVP  Procedural Access Site: [ ] No hematoma [ ] Non-tender to palpation [ ] 2+ pulse [ ] No bruit [ ] No Ecchymosis  Respiratory: [ ] Clear to auscultation bilaterally  Gastrointestinal: [ ] Soft [ ] Non-tender [ ] Non-distended [ ] BS+  Musculoskeletal: [ ] No clubbing [ ] No joint deformity   Neurologic: [ ] Non-focal  Lymphatic: [ ] No lymphadenopathy  Psychiatry: [ ] AAOx3 [ ] Mood & affect appropriate  Skin: [ ] No rashes [ ] No ecchymoses [ ] No cyanosis    10-08    138  |  101  |  23  ----------------------------<  177<H>  4.9   |  16<L>  |  1.11    Ca    8.5      08 Oct 2021 09:45  Phos  3.8     10-08  Mg     2.2     10-08    TPro  4.9<L>  /  Alb  3.4  /  TBili  1.6<H>  /  DBili  x   /  AST  37  /  ALT  47<H>  /  AlkPhos  220<H>  1008    PT/INR - ( 08 Oct 2021 09:45 )   PT: 18.9 sec;   INR: 1.61 ratio         PTT - ( 08 Oct 2021 09:45 )  PTT:30.3 sec      Serum Pro-Brain Natriuretic Peptide: 50723 pg/mL (10-08 @ 09:45)          ECG:    Echo:    Stress Testing:     Cath:    Imaging:    Interpretation of Telemetry:   HPI: 60 y/o male with EXTENSIVE pmh remarkable for chronic systolic HF ACC/AHA stage C-D, presumed NICMP LVEF <20% LVIDd 7cm, AML s/p SCT '19, factor V mutation with h/o DVT/PE, osteomyelitis with R toe amputation, h/o aspergillosis/CMV viremia, thrombocytopenia, former smoker and COLLEEN not on CPAP who presented for elective RHC. Admission labs remarkable for anemia in spite of h/o 2u PRBC transfusions a few weeks PTA, so was admitted to medicine team for anemia. He is s/p 2 unit PRBC yesterday as well as RHC which showed elevated filling pressures and low cardiac output. He was started on a Lasix gtt/dobutamine and moved to CICU. Overnight became agitated/disoriented, tachypneic, and tachycardic to the 120s. Also c/o chills (afebrile), HA, and urinary retention. Placed on BiPAP briefly, now on Venti mask. Was also hypotensive and started on pressors. Hgb dropped again this morning and he is receiving 1 unit PRBC (ordered in 1/2 units). Overall, poor prognosis and GOC discussed with him and his significant other. Decision made to cap pressors and no do further blood draws.     Events:  Family and patient now requesting pain medications for comfort.     Review Of Systems:  Constitutional: denies fever, chills, Fatigue   HEENT: denies Blurred vision, Eye Pain, Headache   Respiratory: denies Cough, Wheezing , Shortness of breath  Cardiovascular: denies Chest Pain, Palpitations,  GEORGES   Gastrointestinal: denies Abdominal Pain, Diarrhea, Constipation   Genitourinary: denies Nocturia, Dysuria, Incontinence  Extremities: denies Swelling, Joint Pain  Neurologic: denies Focal deficit, Paresthesias, Syncope  Lymphatic: denies Swelling, Lymphadenopathy   Skin: denies Rash, Ecchymoses, Wounds   Psychiatry: denies Depression, Suicidal/Homicidal Ideation, anxiety  [X ] 10 point review of systems is otherwise negative except as mentioned above         Medications:  acetaminophen   Tablet .. 650 milliGRAM(s) Oral every 6 hours PRN  acyclovir   Oral Tab/Cap 400 milliGRAM(s) Oral two times a day  ALPRAZolam 0.5 milliGRAM(s) Oral at bedtime PRN  cefepime   IVPB      cefepime   IVPB 1000 milliGRAM(s) IV Intermittent every 12 hours  chlorhexidine 2% Cloths 1 Application(s) Topical daily  diphenhydrAMINE 25 milliGRAM(s) Oral every 4 hours PRN  DOBUTamine Infusion 5 MICROgram(s)/kG/Min IV Continuous <Continuous>  finasteride 5 milliGRAM(s) Oral daily  folic acid 1 milliGRAM(s) Oral daily  furosemide Infusion 2.5 mG/Hr IV Continuous <Continuous>  HYDROmorphone  Injectable 0.2 milliGRAM(s) IV Push every 2 hours PRN  influenza   Vaccine 0.5 milliLiter(s) IntraMuscular once  lidocaine 2% Jelly 3 milliLiter(s) IntraUrethral once  LORazepam   Injectable 0.25 milliGRAM(s) IV Push every 4 hours PRN  multivitamin 1 Tablet(s) Oral daily  norepinephrine Infusion 0.05 MICROgram(s)/kG/Min IV Continuous <Continuous>  pantoprazole    Tablet 40 milliGRAM(s) Oral before breakfast  sertraline 50 milliGRAM(s) Oral daily  tamsulosin 0.4 milliGRAM(s) Oral at bedtime  vasopressin Infusion 0.04 Unit(s)/Min IV Continuous <Continuous>  voriconazole 200 milliGRAM(s) Oral every 12 hours    Vitals:  T(C): 36.9 (10-10-21 @ 07:00), Max: 37.1 (10-10-21 @ 03:00)  HR: 103 (10-10-21 @ 06:00) (95 - 117)  RR: 20 (10-10-21 @ 06:00) (20 - 52)  SpO2: 93% (10-10-21 @ 06:00) (78% - 98%)    Daily     Daily Weight in k.1 (10 Oct 2021 00:00)  I&O's Summary    08 Oct 2021 07:  -  09 Oct 2021 07:00  --------------------------------------------------------  IN: 1809 mL / OUT: 445 mL / NET: 1364 mL    09 Oct 2021 07:  -  10 Oct 2021 06:49  --------------------------------------------------------  IN: 1258.3 mL / OUT: 605 mL / NET: 653.3 mL    Physical Exam:  Appearance: [ X] Normal [X ] NAD  Eyes: [ X] PERRL [ X] EOMI  HENT: [X ] Normal oral muscosa [X ]NC/AT  Cardiovascular: [ X] S1 [ X] S2 [X ] RRR + edema. + JVD  Procedural Access Site: RIJ TLC PA-C C/D/I without bleeding, induration, or hematoma.   Respiratory: dyspnic, coarse breath sounds  Gastrointestinal: [x ] Soft [ x] Non-tender [x ] Non-distended   Musculoskeletal: [ x] No clubbing [ x] No joint deformity   Neurologic: [x ] Non-focal  Lymphatic: [ x] No lymphadenopathy  Psychiatry: [x ] AAOx3 [ x] Mood & affect appropriate  Skin: [x ] No rashes [x ] No ecchymoses [x ] No cyanosis    10-08    138  |  101  |  23  ----------------------------<  177<H>  4.9   |  16<L>  |  1.11    Ca    8.5      08 Oct 2021 09:45  Phos  3.8     10-08  Mg     2.2     10-08    TPro  4.9<L>  /  Alb  3.4  /  TBili  1.6<H>  /  DBili  x   /  AST  37  /  ALT  47<H>  /  AlkPhos  220<H>  10-08    PT/INR - ( 08 Oct 2021 09:45 )   PT: 18.9 sec;   INR: 1.61 ratio         PTT - ( 08 Oct 2021 09:45 )  PTT:30.3 sec      Serum Pro-Brain Natriuretic Peptide: 52680 pg/mL (10-08 @ 09:45)    ECG: < from: 12 Lead ECG (10.07.21 @ 08:24) >  Diagnosis Line NORMAL SINUS RHYTHM  POSSIBLE LEFT ATRIAL ENLARGEMENT  LEFT AXIS DEVIATION  RIGHT BUNDLE BRANCH BLOCK  INFERIOR INFARCT (CITED ON OR BEFORE 06-OCT-2021)  T WAVE ABNORMALITY, CONSIDER LATERAL ISCHEMIA    Echo: < from: Transthoracic Echocardiogram (10.08.21 @ 13:00) >  Sonographer's note: patient / family refused to allow  completion of the study.  Severe left ventricular enlargement.  Severely decreased left ventricualr systolic function.  Diffuse hypokinesis.  Right ventricular enlargement with decreased right  ventricular systolic function.  Severe tricuspid regurgitation.  A catheter is noted in the right heart.    Cath: < from: MR Cardiac w/wo IV Cont (19 @ 15:10) >  IMPRESSION:    1.  The calculated left ventricular ejection fraction is 24.41%.  2.  Mid wall myocardial late gadolinium enhancement at the base involving   the anteroseptal and inferoseptal wall is associated with a nonischemic   cardiomyopathy.  3.  No ventricular thrombus.    Imaging: < from: Xray Chest 1 View- PORTABLE-Urgent (Xray Chest 1 View- PORTABLE-Urgent .) (10.07.21 @ 17:02) >  IMPRESSION:    [place]-Kasia catheter with tip in the right mid pulmonary artery.    Unchanged large layering left pleural effusion and associated partial atelectasis of left lung.    Interpretation of Telemetry: St 110

## 2021-10-10 NOTE — PROGRESS NOTE ADULT - ASSESSMENT
60 yo M w/ pmh of HFrEF, AML s/p BMT p/w cardiogenic shock now patient and family leaning towards comfort care.     Neuro  - A and O x 4  - Dilaudid .2 prn for dyspnea  - Ativan prn for anxiety    Resp  - On 3 L NC     AdHF  - On Levo 0.17, Vaso 0.04 , Lasix 2.5 , pressors capped. Family expressing desire to withdraw care awaiting visit from daughter.   - No blood draws per family  - ,  overall positive.     GI  -Pleasure feeds  - Continue PPI      - Continue flomax  - Padilla in place for comfort    Renal  -  UO improved not checking labs per family request    ID  - Continue Cefepime for concern of sepsis  - Continue acyclovir     Heme  - compression device on, no heparin due to low platelets    Endo  - Glucose monitoring q 8 hours    Meeting was held today with HCP and children. They wish to start pain meds for comfort awaiting arrival of all children    Bere jordan, DNP

## 2021-10-10 NOTE — PROGRESS NOTE ADULT - SUBJECTIVE AND OBJECTIVE BOX
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 Patient presenting here for RHC to guide management given severe heart failure. Patient found to be anemia with hemoglobin of 6s (patient's baseline seems to be 9-10). Unable to get RHC due to anemia; thus, transferred to medicine. He was recently admitted to [facility] for falls and started on midodrine to augment BP. Patient seen on 3DSU prior to initiation of transfusions. He is very nonchalant. He has no complaints, shrugs to most of my questions.  (06 Oct 2021 13:48)    Patient is doing poorly and is now DNR.  He is agitated and not responsive, refusing any further care.      PAST MEDICAL & SURGICAL HISTORY:  Factor 5 Leiden mutation, heterozygous    H/O cardiomyopathy    Deep vein thrombosis (DVT)    Pulmonary embolism    COLLEEN (obstructive sleep apnea)  as per pt. he tried cpap/ bipap in the past did not like it so never used after that.    Thrombocytopenia    History of hypotension    H/O bone marrow transplant        MEDICATIONS  (STANDING):  acyclovir   Oral Tab/Cap 400 milliGRAM(s) Oral two times a day  chlorhexidine 2% Cloths 1 Application(s) Topical daily  DOBUTamine Infusion 5 MICROgram(s)/kG/Min (12.3 mL/Hr) IV Continuous <Continuous>  finasteride 5 milliGRAM(s) Oral daily  folic acid 1 milliGRAM(s) Oral daily  furosemide Infusion 2.5 mG/Hr (1.25 mL/Hr) IV Continuous <Continuous>  influenza   Vaccine 0.5 milliLiter(s) IntraMuscular once  lidocaine 2% Jelly 3 milliLiter(s) IntraUrethral once  multivitamin 1 Tablet(s) Oral daily  norepinephrine Infusion 0.05 MICROgram(s)/kG/Min (7.69 mL/Hr) IV Continuous <Continuous>  pantoprazole    Tablet 40 milliGRAM(s) Oral before breakfast  sertraline 50 milliGRAM(s) Oral daily  tamsulosin 0.4 milliGRAM(s) Oral at bedtime  vasopressin Infusion 0.04 Unit(s)/Min (2.4 mL/Hr) IV Continuous <Continuous>  voriconazole 200 milliGRAM(s) Oral every 12 hours    MEDICATIONS  (PRN):  acetaminophen   Tablet .. 650 milliGRAM(s) Oral every 6 hours PRN Temp greater or equal to 38C (100.4F), Mild Pain (1 - 3)  ALPRAZolam 0.5 milliGRAM(s) Oral at bedtime PRN Anxiety  HYDROmorphone  Injectable 0.2 milliGRAM(s) IV Push every 2 hours PRN dyspnea  LORazepam   Injectable 0.25 milliGRAM(s) IV Push every 4 hours PRN Anxiety      REVIEW OF SYSTEMS: unobtainable    Vital Signs Last 24 Hrs  T(C): 36.3 (10 Oct 2021 11:00), Max: 37.1 (10 Oct 2021 03:00)  T(F): 97.4 (10 Oct 2021 11:00), Max: 98.8 (10 Oct 2021 03:00)  HR: 101 (10 Oct 2021 12:00) (95 - 117)  BP: --  BP(mean): --  RR: 35 (10 Oct 2021 12:00) (18 - 65)  SpO2: 94% (10 Oct 2021 12:00) (78% - 97%)    I&O's Summary    09 Oct 2021 07:01  -  10 Oct 2021 07:00  --------------------------------------------------------  IN: 1305.4 mL / OUT: 700 mL / NET: 605.4 mL    10 Oct 2021 07:01  -  10 Oct 2021 12:16  --------------------------------------------------------  IN: 480.5 mL / OUT: 200 mL / NET: 280.5 mL        PHYSICAL EXAM:    Constitutional: ill appearing  Eyes:  Pupils round, no lesions  ENMT: no exudate or erythema  Pulmonary: scattered rhonchi bilat  Cardiovascular: PMI not palpable RRR normal S1 and S2, no murmurs, rubs, gallops or clicks  Gastrointestinal: Bowel Sounds present, soft, nontender.   Lymph: No cervical lymphadenopathy.  Neurological: no focal deficits  Skin: No rashes.  No cyanosis.  Psych: agitated   Ext: No lower ext edema    < from: 12 Lead ECG (10.07.21 @ 08:24) >    Ventricular Rate 92 BPM    Atrial Rate 92 BPM    P-R Interval 206 ms    QRS Duration 166 ms    Q-T Interval 462 ms    QTC Calculation(Bazett) 571 ms    P Axis 37 degrees    R Axis -70 degrees    T Axis 130 degrees    Diagnosis Line NORMAL SINUS RHYTHM  POSSIBLE LEFT ATRIAL ENLARGEMENT  LEFT AXIS DEVIATION  RIGHT BUNDLE BRANCH BLOCK  INFERIOR INFARCT (CITED ON OR BEFORE 06-OCT-2021)  T WAVE ABNORMALITY, CONSIDER LATERAL ISCHEMIA  ABNORMAL ECG  WHEN COMPARED WITH ECG OF 06-OCT-2021 11:50, (UNCONFIRMED)  NO SIGNIFICANT CHANGE WAS FOUND  Confirmed by ANDRADE YA SHAHRYAR G ([id number]) on 10/7/2021 11:00:09 AM    < end of copied text >      < from: Xray Chest 1 View- PORTABLE-Routine (Xray Chest 1 View- PORTABLE-Routine in AM.) (10.08.21 @ 04:50) >    EXAM:  XR CHEST PORTABLE ROUTINE 1V                          EXAM:  XR CHEST PORTABLE URGENT 1V                            PROCEDURE DATE:  10/07/2021            INTERPRETATION:  CLINICAL INFORMATION: [place] placement.    EXAM: Frontal radiograph of the chest.    COMPARISON: Chest x-ray from one day prior.    FINDINGS:    Chest x-ray 10/7/2021 4:59 PM:    Interval placement of a [place]-Kasia catheter with tip probably in the distal right main/interlobar pulmonary artery.    There is a layering left pleural effusion with associated left lung partial atelectasis. Probable trace right effusion as well. These findings are unchanged from prior.    Heart size is without change from prior.    Chest x-ray 10/8/2021:    Interval retraction of [place]-Kasia catheter with tip in the right main pulmonary artery.    Unchanged layering left pleural effusion and associated left lung partial atelectasis.      IMPRESSION:    [place]-Kasia catheter with tip in the right mid pulmonary artery.    Unchanged large layering left pleural effusion and associated partial atelectasis of left lung.    --- End of Report ---              ENRRIQUE VANEGAS MD; Resident Radiologist  This document has been electronically signed.  JEAN PAUL XIE MD; Attending Radiologist  This documenthas been electronically signed. Oct  8 2021  3:25PM    < end of copied text >    < from: Transthoracic Echocardiogram (10.08.21 @ 13:00) >    Patient name: DI CONTRERAS  YOB: 1960   Age: 61 (M)   MR#: [id number]  Study Date: 10/8/2021  [facility] Sammi [facility] RDCS  Study quality: Technically difficult  Referring Physician: Marilyn Joshi MD  Blood Pressure: 102/56 mmHg  Height: 185 cm  Weight: 82 kg  BSA: 2.1 m2  ------------------------------------------------------------------------  PROCEDURE: Transthoracic echocardiogram with 2-D, M-Mode  and complete spectral and color flow Doppler.  INDICATION: Shock, unspecified (R57.9)  ------------------------------------------------------------------------  Dimensions:    Normal Values:  LA:     3.5    2.0 - 4.0 cm  Ao:     3.9    2.0 - 3.8 cm  SEPTUM: 0.7    0.6 - 1.2 cm  PWT:    0.8    0.6 - 1.1cm  LVIDd:  7.1    3.0 - 5.6 cm  LVIDs:  6.2    1.8 - 4.0 cm  Derived variables:  LVMI: 123 g/m2  RWT: 0.24  EF (Visual Estimate): 25 %  ------------------------------------------------------------------------  Observations:  Mitral Valve: Normal mitral valve.  Aortic Valve/Aorta: Calcified aortic valve with normal  opening.  Normal aortic root size.  Left Atrium: Moderately dilated left atrium.  Left Ventricle: Severe left ventricular enlargement.  Severely decreased left ventricualr systolic function.  Diffuse hypokinesis.  Right Heart: Normal right atrium.  Right ventricular enlargement with decreased right  ventricular systolic function.  A catheter is noted in the right heart.  Severe tricuspid regurgitation.  Normal pulmonic valve. Mildpulmonic regurgitation.  Pericardium/Pleura: Normal pericardium with no pericardial  effusion.  Left pleural effusion.  Hemodynamic: Cannot estimate pulmonary artery pressure; TR  is too severe.  ------------------------------------------------------------------------  Conclusions:  Sonographer's note: patient / family refused to allow  completion of the study.  Severe left ventricular enlargement.  Severely decreased left ventricualr systolic function.  Diffuse hypokinesis.  Right ventricular enlargement with decreased right  ventricular systolic function.  Severe tricuspid regurgitation.  A catheter is noted in the right heart.  ------------------------------------------------------------------------  Confirmed on  10/8/2021 - 18:02:56 by Ahmet Alonso MD, FASE  ------------------------------------------------------------------------    < end of copied text >

## 2021-10-10 NOTE — PROGRESS NOTE ADULT - ASSESSMENT
62 y/o male with EXTENSIVE pmh remarkable for chronic systolic HF ACC/AHA stage C-D, presumed NICMP LVEF <20% LVIDd 7cm, AML s/p SCT '19, factor V mutation with h/o DVT/PE, osteomyelitis with R toe amputation, h/o aspergillosis/CMV viremia, thrombocytopenia, former smoker and COLLEEN not on CPAP who presented for elective RHC. Admission labs remarkable for anemia in spite of h/o 2u PRBC transfusions a few weeks PTA, so was admitted to medicine team for anemia.  He received 2 units of red blood cells  He is now on comfort measures with no change in his current intravenous inotropes and pressors.  [place]-K[place] catheter has been discontinued and he is being made comfortable.  - he has had NYHA Class III-IV Stage C nonischemic systolic HF  - right heart cath revealed pa sat 38 and was moved to the icu for a trial of inotropic support  - Continue intravenous dobutamine at 5 mcg/kg/min, vasopressin, and norepinephrine  - cont furosemide infusion  - not a candidate for heart transplant or LVAD at this time given his comorbidities  -apparent failure of bone marrow transplant  -onc followup to prognosis and goals of care  - transfusion dependent- resume toprol when able from the perspective of inotropic support and bp    All other workup per primary team. Will followup.     Upon my evaluation, this patient is at high risk for imminent or life threatening deterioration due to cardiogenic shock, hypotension,  and other active medical issues which require my direct attention, intervention, and personal management.  I have personally spent >35 minutes  of critical care time exclusive of time spent on separate billing procedures. This includes review of laboratory data, radiology results, discussion with primary team\patient, and monitoring for potential decompensation Interventions were performed as documented above.

## 2021-10-10 NOTE — PROGRESS NOTE ADULT - ATTENDING COMMENTS
62 yo man with AML s/p BMT 2019, HFrEF, p/w for elective RHC found to have significant anemia s/p blood transfusion and RHC yesterday which showed cardiogenic shock, requiring initiation of inotropic support.      On Friday found to be in worsened cardiogenic shock, Dobutamine restarted and pressors continued.  Lasix re-started as well.  Large left sided pleural effusion unable to tap given worsened hemo and low plts   Possible vasoplegia/sepsis as well given immunocompromised stated and broad spectrum abx given.  + MSSA previously got vanco when he originally deteriorated     Worsened anemia s/p tx recently and another 1/2 unit 10/08  Resp failure with hypoxia 4L NC sat mid 90s.    Previously pt was made DNR/DNI as per family's and HCP request.  As of today they are thinking about comfort care only and withdrawal of pressors/inotropes.  They did have questions about possibility of "recuperation" given he has stabilized since Friday and has had some urine output. We discussed that long term he most definitely not survive and overall his prognosis is grim.

## 2021-10-11 NOTE — DIETITIAN INITIAL EVALUATION ADULT. - REASON FOR ADMISSION
initially presented for RHC to guide management given severe heart failure  found to be anemic, unable to get RHC  transferred to medicine

## 2021-10-11 NOTE — PROGRESS NOTE ADULT - PROBLEM SELECTOR PLAN 5
Spoke with the family at the bedside. They would like for patient to be moved to the PCU when bed becomes available. They want to be notified prior to patient being moved.  As per discussion with the primary team, a decision was made to cap pressors and inotrope
s/p chemo followed by SCT (son) 10/2019  - Off MMF/tacro/prednisone  - As per notes, poor graft function w dropping chimerism  - Was on Vidaza - currently on hold (last in May 2021)
Not on CPAP & not interested
s/p chemo followed by SCT (son) 10/2019  - Off MMF/tacro/prednisone  - As per notes, poor graft function w dropping chimerism  - Was on Vidaza - currently on hold (last in May 2021)  - f/u heme/onc recs.

## 2021-10-11 NOTE — PROGRESS NOTE ADULT - PROBLEM SELECTOR PROBLEM 5
Advanced care planning/counseling discussion
COLLEEN (obstructive sleep apnea)
AML (acute myeloblastic leukemia)
AML (acute myeloblastic leukemia)

## 2021-10-11 NOTE — PROGRESS NOTE ADULT - PROVIDER SPECIALTY LIST ADULT
ELIESER
Cardiology
ELIESER
Cardiology
Cardiology
ELIESER
Cardiology
Hospitalist
ELIESER
Heart Failure
Palliative Care
Heart Failure

## 2021-10-11 NOTE — PROGRESS NOTE ADULT - SUBJECTIVE AND OBJECTIVE BOX
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 Patient presenting here for RHC to guide management given severe heart failure. Patient found to be anemia with hemoglobin of 6s (patient's baseline seems to be 9-10). Unable to get RHC due to anemia; thus, transferred to medicine. He was recently admitted to [facility] for falls and started on midodrine to augment BP. Patient seen on 3DSU prior to initiation of transfusions. He is very nonchalant. He has no complaints, shrugs to most of my questions.  (06 Oct 2021 13:48)      ====================  VITALS (Last 12 hrs):  ====================    T(C): 36.3 (10-11-21 @ 18:00), Max: 37 (10-11-21 @ 14:00)  T(F): 97.3 (10-11-21 @ 18:00), Max: 98.6 (10-11-21 @ 14:00)  HR: 96 (10-11-21 @ 21:00) (89 - 119)  BP: --  BP(mean): --  ABP: 34/21 (10-11-21 @ 21:00) (34/21 - 112/83)  ABP(mean): 25 (10-11-21 @ 21:00) (25 - 94)  RR: 15 (10-11-21 @ 21:00) (10 - 53)  SpO2: 74% (10-11-21 @ 16:30) (74% - 97%)  Wt(kg): --  CVP(mm Hg): --  CVP(cm H2O): --  CO: --  CI: --  PA: --  PA(mean): --  PCWP: --  SVR: --  PVR: --    I&O's Summary    10 Oct 2021 07:01  -  11 Oct 2021 07:00  --------------------------------------------------------  IN: 1540.4 mL / OUT: 1110 mL / NET: 430.4 mL    11 Oct 2021 07:01  -  11 Oct 2021 21:39  --------------------------------------------------------  IN: 574.9 mL / OUT: 300 mL / NET: 274.9 mL            ====================  NEW LABS:  ====================      10-10    136  |  101  |  34<H>  ----------------------------<  156<H>  4.0   |  20<L>  |  1.13    Ca    8.5      10 Oct 2021 16:50                  ====================  PLAN:  ====================    ** Pt has been made DNR/DNI and is only receiving medications for comfort measures only **  The pt will be maintained on a dilaudid gtt @ 0.5mg/hr and will have appropriate PRN orders for necessary symptoms of pain, anxiety, secretions and so forth  - No labs to be drawn and Vitals will be documented every hour         HEATH Ye

## 2021-10-11 NOTE — PROGRESS NOTE ADULT - ATTENDING COMMENTS
History of AML s/p bone marrow trans[plant that has likely failed given pancytopenia and need for recurrent transfusions  Cardiogenic shock on multiple inotropes and pressors  Palliative Care is following  DNR/DNI  Plan for withdrawal of care today per family History of AML s/p bone marrow trans[plant that has likely failed given pancytopenia and need for recurrent transfusions  Cardiogenic shock on multiple inotropes and pressors  Palliative Care is following  DNR/DNI  Plan for withdrawal of care today per family  Will start Dilaudid drip for comfort History of AML s/p bone marrow transplant that has likely failed given pancytopenia and need for recurrent transfusions  Cardiogenic shock on multiple inotropes and pressors  Palliative Care is following  DNR/DNI  Plan for withdrawal of care today per family  Will start Dilaudid drip for comfort

## 2021-10-11 NOTE — PROGRESS NOTE ADULT - ASSESSMENT
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 Admission labs remarkable for anemia in spite of h/o 2u PRBC transfusions a few weeks PTA, so was admitted to medicine team for anemia.     He is s/p 2 unit PRBC yesterday as well as RHC which showed elevated filling pressures and low cardiac output. He was started on a Lasix gtt/dobutamine and moved to CICU. Overnight became agitated/disoriented, tachypneic, and tachycardic to the 120s. Also c/o chills (afebrile), HA, and urinary retention. Placed on BiPAP briefly, now on Venti mask. Was also hypotensive and started on pressors. Hgb dropped again this morning and he is receiving 1 unit PRBC (ordered in 1/2 units).     Overall, poor prognosis and GOC previously discussed with him and his significant other. Patient was disengaged in the conversation, but his SO stated that he had a prior DNR/DNI during his last hospitalization. Palliative was consulted as he is not a candidate for advanced therapies due to multiple comorbidities, active etoh use and he does not seem interested in aggressive treatments per prior conversations. Awaiting family at bedside with plan to stop pressors/inotropes.    Hemodynamics:  10/8 ( 5, levo .15, vaso .04): CVP 17, PA 37/17/28, PA sat 34.1 (off ?), CO/CI 3.7/1.8  10/7 RHC: RA 17, PA 43/22/33, PCWP 25, PA sat 30.3%, CI 2.0?, SBP 80s    Pertinent Cardiac Studies   2021 TTE: LVEF <20%, LVIDd 7cm, trace MR, mild TR, RVSP 28 mmHg (RAP 3 mmHg)  2020 TTE: LVEF 21% LVIDd ?6.03cm, GLS Avg -7.9%, mild MR, RV mildly enlarged w severely decreased systolic function (S ' 0.9 m/s, TAPSE 0.7cm ), moderate TR, RVSP 37mmHg (RAP ~10 mmHg)  2020 LVEF 31% LVIDd 7.3cm, mild MR, grade DD, nl RV size/function, RVSP 51mmHg (RAP ~8 mmHg)   2019 LVEF 42% LVIDd 7.0 cm, mild MR, Nl RV size/function.       cMRI: LVEF 24%, LV diameter 5.3cm, globally hypokinetic, CO/CI 4.79/2.31. Mid wall myocardial LGE at the base involving anteroseptal and inferoseptal wall is associated with a nonischemic cardiomyopathy. Nl LV thrombus.     LHC done in  which showed no CAD (as per prior notes-?[facility])

## 2021-10-11 NOTE — PROGRESS NOTE ADULT - REASON FOR ADMISSION
Right Heart Cath
Right Heart Cath and Severe anemia
Right Heart Cath

## 2021-10-11 NOTE — PROGRESS NOTE ADULT - PROBLEM SELECTOR PLAN 7
H/o Aspergillosis and 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  - On voriconazole/Acyclovir  - H/o pancytopenia/neutropenia while on chemo.
H/o Aspergillosis and 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  - On voriconazole/Acyclovir  - H/o pancytopenia/neutropenia while on chemo.

## 2021-10-11 NOTE — PROGRESS NOTE ADULT - SUBJECTIVE AND OBJECTIVE BOX
Subjective:  - awaiting family at bedside to transition to comfort care  - confused, denies pain    Medications:  acetaminophen   Tablet .. 650 milliGRAM(s) Oral every 6 hours PRN  acyclovir   Oral Tab/Cap 400 milliGRAM(s) Oral two times a day  ceFAZolin   IVPB 1000 milliGRAM(s) IV Intermittent every 12 hours  chlorhexidine 2% Cloths 1 Application(s) Topical daily  DOBUTamine Infusion 5 MICROgram(s)/kG/Min IV Continuous <Continuous>  finasteride 5 milliGRAM(s) Oral daily  folic acid 1 milliGRAM(s) Oral daily  furosemide Infusion 2.5 mG/Hr IV Continuous <Continuous>  HYDROmorphone  Injectable 0.2 milliGRAM(s) IV Push every 2 hours PRN  influenza   Vaccine 0.5 milliLiter(s) IntraMuscular once  lidocaine 2% Jelly 3 milliLiter(s) IntraUrethral once  LORazepam   Injectable 0.25 milliGRAM(s) IV Push every 4 hours PRN  multivitamin 1 Tablet(s) Oral daily  norepinephrine Infusion 0.05 MICROgram(s)/kG/Min IV Continuous <Continuous>  pantoprazole    Tablet 40 milliGRAM(s) Oral before breakfast  sertraline 50 milliGRAM(s) Oral daily  sodium chloride 0.65% Nasal 1 Spray(s) Both Nostrils two times a day PRN  tamsulosin 0.4 milliGRAM(s) Oral at bedtime  vasopressin Infusion 0.04 Unit(s)/Min IV Continuous <Continuous>  voriconazole 200 milliGRAM(s) Oral every 12 hours    Physical Exam:    Vitals:  Vital Signs Last 24 Hours  T(C): 36.8 (10-11-21 @ 11:00), Max: 37.5 (10-11-21 @ 00:00)  HR: 110 (10-11-21 @ 11:15) (103 - 119)  BP: 90/73- 113/72  RR: 27 (10-11-21 @ 11:15) (20 - 53)  SpO2: 95% (10-11-21 @ 11:15) (77% - 96%)    I&O's Summary    10 Oct 2021 07:01  -  11 Oct 2021 07:00  --------------------------------------------------------  IN: 1535.4 mL / OUT: 1110 mL / NET: 425.4 mL    11 Oct 2021 07:01  -  11 Oct 2021 12:16  --------------------------------------------------------  IN: 168.4 mL / OUT: 155 mL / NET: 13.4 mL    Tele: ST    General: No distress. Comfortable.  HEENT: EOM intact.  Neck: Neck supple. JVP not elevated. No masses  Chest: Clear to auscultation bilaterally  CV: Regular, Normal S1 and S2. No murmurs, rub, or gallops. Radial pulses normal. Trace LE edema  Abdomen: Soft, non-distended, non-tender  Skin: No rashes or skin breakdown  Neurology: Alert confused  Psych: Restless    Labs:    10-10    136  |  101  |  34<H>  ----------------------------<  156<H>  4.0   |  20<L>  |  1.13    Ca    8.5      10 Oct 2021 16:50    Serum Pro-Brain Natriuretic Peptide: 70123 pg/mL (10-08 @ 09:45)

## 2021-10-11 NOTE — PROGRESS NOTE ADULT - NS MD NEURO CONDITIONS_HEART1
Acute on Chronic
Acute on Chronic
Acute
Acute on Chronic

## 2021-10-11 NOTE — PROGRESS NOTE ADULT - ASSESSMENT
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 Admission labs remarkable for anemia in spite of h/o 2u PRBC transfusions a few weeks PTA, so was admitted to medicine team for anemia.  He received 2 units of red blood cells    - he has had NYHA Class III-IV Stage C nonischemic systolic HF  - right heart cath revealed pa sat 38 and was moved to the icu for a trial of inotropic support  - Continue intravenous dobutamine at 5 mcg/kg/min, vasopressin, and norepinephrine  - cont furosemide infusion  - not a candidate for heart transplant or LVAD at this time given his comorbidities  - apparent failure of bone marrow transplant  - transfusion dependent    - pall care eval appreciated. He is now on comfort measures with no change in his current intravenous inotropes and pressors.  - Per team plan is to withdraw pressors and inotropes today.       All other workup per primary team. Will followup.     Upon my evaluation, this patient is at high risk for imminent or life threatening deterioration due to cardiogenic shock, hypotension,  and other active medical issues which require my direct attention, intervention, and personal management.  I have personally spent >30 minutes  of critical care time exclusive of time spent on separate billing procedures. This includes review of laboratory data, radiology results, discussion with primary team\patient, and monitoring for potential decompensation Interventions were performed as documented above.

## 2021-10-11 NOTE — DIETITIAN INITIAL EVALUATION ADULT. - ORAL INTAKE PTA/DIET HISTORY
Per H&P, pt with no change in appetite PTA, taking multivitamin, folic acid PTA. Also noted to be taking torsemide PTA. Per Patient Profile, pt was swallowing foods/liquids without difficulty at time of admission. NKFA per chart.

## 2021-10-11 NOTE — PROGRESS NOTE ADULT - ASSESSMENT
60 yo M w/ pmh of HFrEF, AML s/p BMT p/w cardiogenic shock now patient and family leaning towards comfort care.     Neuro  - A and O x 4  - Dilaudid .2 prn for dyspnea  - Ativan prn for anxiety    Resp  - On 3 L NC     AdHF  - On Levo 0.17, Vaso 0.04 , Lasix 2.5 , pressors capped. Family expressing desire to withdraw care awaiting visit from daughter.   - No blood draws per family  - ,  overall positive.     GI  -Pleasure feeds  - Continue PPI      - Continue flomax  - Padilla in place for comfort    Renal  -  UO improved not checking labs per family request    ID  - Continue Cefepime for concern of sepsis  - Continue acyclovir     Heme  - compression device on, no heparin due to low platelets    Endo  - Glucose monitoring q 8 hours    Meeting was held today with HCP and children. They wish to start pain meds for comfort awaiting arrival of all children    Bere jordan, DNP 62 yo M w/ pmh of HFrEF, AML s/p BMT p/w cardiogenic shock now patient and family leaning towards comfort care.     Neuro  - A and O x 4  - Dilaudid .2 prn for dyspnea  - Ativan prn for anxiety    Resp  - On 3 L NC   - L. pleural effusion    AdHF  - Endstage, with cardiogenic shock  - On Levo 0.17, Vaso 0.04 , Lasix 2.5,  2.5 pressors capped. Family expressing desire to withdraw care awaiting visit from daughter.   - No blood draws per family      GI  - Pleasure feeds  - Continue PPI      - Continue flomax  - Padilla in place for comfort    Renal  -  UO improved not checking labs per family request    ID  - Continue ancef for Staph bacteremia  - Renally dosed 2/2 no labs x2d  - Initial source unclear, no repeat cultures per family wishes    Heme  - compression device on, no heparin due to low platelets  - Hx of AML s/p failed BM transplant  - Pancytopenic    Endo  - Glucose monitoring q 8 hours    GOC:  DNR/DNI, pending withdrawal of care today

## 2021-10-11 NOTE — DIETITIAN INITIAL EVALUATION ADULT. - CHIEF COMPLAINT
cardiogenic shock  per team, family leaning towards comfort care  Palliative Care following; per team, plan for withdrawal of care today per family

## 2021-10-11 NOTE — PROGRESS NOTE ADULT - SUBJECTIVE AND OBJECTIVE BOX
[facility] -- Ede Zamudio, Edgar Lyn, Jose Rabago Savella  Office # [phone]      Follow Up:  ADHF    Subjective/Observations: Patient is seen and examined. Currently in ICU. Maintained on pressor and inotrope support. Pt is unable to provide any meaningful information. responsive but confused      REVIEW OF SYSTEMS: Limited 2/2 comorbidities     PAST MEDICAL & SURGICAL HISTORY:  Factor 5 Leiden mutation, heterozygous    H/O cardiomyopathy    Deep vein thrombosis (DVT)    Pulmonary embolism    COLLEEN (obstructive sleep apnea)  as per pt. he tried cpap/ bipap in the past did not like it so never used after that.    Thrombocytopenia    History of hypotension    H/O bone marrow transplant        MEDICATIONS  (STANDING):  acyclovir   Oral Tab/Cap 400 milliGRAM(s) Oral two times a day  ceFAZolin   IVPB 1000 milliGRAM(s) IV Intermittent every 12 hours  chlorhexidine 2% Cloths 1 Application(s) Topical daily  DOBUTamine Infusion 5 MICROgram(s)/kG/Min (12.3 mL/Hr) IV Continuous <Continuous>  finasteride 5 milliGRAM(s) Oral daily  folic acid 1 milliGRAM(s) Oral daily  furosemide Infusion 2.5 mG/Hr (1.25 mL/Hr) IV Continuous <Continuous>  influenza   Vaccine 0.5 milliLiter(s) IntraMuscular once  lidocaine 2% Jelly 3 milliLiter(s) IntraUrethral once  multivitamin 1 Tablet(s) Oral daily  norepinephrine Infusion 0.05 MICROgram(s)/kG/Min (7.69 mL/Hr) IV Continuous <Continuous>  pantoprazole    Tablet 40 milliGRAM(s) Oral before breakfast  sertraline 50 milliGRAM(s) Oral daily  tamsulosin 0.4 milliGRAM(s) Oral at bedtime  vasopressin Infusion 0.04 Unit(s)/Min (2.4 mL/Hr) IV Continuous <Continuous>  voriconazole 200 milliGRAM(s) Oral every 12 hours    MEDICATIONS  (PRN):  acetaminophen   Tablet .. 650 milliGRAM(s) Oral every 6 hours PRN Temp greater or equal to 38C (100.4F), Mild Pain (1 - 3)  ALPRAZolam 0.5 milliGRAM(s) Oral at bedtime PRN Anxiety  HYDROmorphone  Injectable 0.2 milliGRAM(s) IV Push every 2 hours PRN dyspnea  LORazepam   Injectable 0.25 milliGRAM(s) IV Push every 4 hours PRN Anxiety  sodium chloride 0.65% Nasal 1 Spray(s) Both Nostrils two times a day PRN Nasal Congestion      Allergies    No Known Allergies    Intolerances            Vital Signs Last 24 Hrs  T(C): 36.8 (11 Oct 2021 08:00), Max: 37.5 (11 Oct 2021 00:00)  T(F): 98.2 (11 Oct 2021 08:00), Max: 99.5 (11 Oct 2021 00:00)  HR: 112 (11 Oct 2021 08:15) (100 - 112)  BP: --  BP(mean): --  RR: 34 (11 Oct 2021 08:15) (20 - 65)  SpO2: 92% (11 Oct 2021 08:15) (86% - 95%)    I&O's Summary    10 Oct 2021 07:01  -  11 Oct 2021 07:00  --------------------------------------------------------  IN: 1535.4 mL / OUT: 1110 mL / NET: 425.4 mL    11 Oct 2021 07:01  -  11 Oct 2021 09:53  --------------------------------------------------------  IN: 42.1 mL / OUT: 30 mL / NET: 12.1 mL          PHYSICAL EXAM:  TELE: ST  Constitutional: awake confused   HEENT: Dry Mucous Membranes, Anicteric  Pulmonary: Decreased breath sounds b/l. No rales, crackles or wheeze appreciated.   Cardiovascular: tachy, S1 and S2, + Murmur  Gastrointestinal: Bowel Sounds present, soft, nontender.   Lymph: + peripheral edema. No lymphadenopathy.  Skin: No visible rashes or ulcers.  Psych: unable to assess    LABS: All Labs Reviewed:    10 Oct 2021 16:50    136    |  101    |  34     ----------------------------<  156    4.0     |  20     |  1.13     Ca    8.5        10 Oct 2021 16:50

## 2021-10-11 NOTE — DIETITIAN INITIAL EVALUATION ADULT. - ADD RECOMMEND
Nutrition care plan to remain consistent with GOC. Defer nutrition provision to team. Monitor PO intake, weight, labs, skin, GI status, diet as appropriate.

## 2021-10-11 NOTE — DIETITIAN INITIAL EVALUATION ADULT. - OTHER INFO
Pt visited, lethargic. Noted to be confused, disoriented with garbled speech. Unable to participate in RD interview at this time. No visitors at bedside. 0% PO intake of documented meals today and yesterday noted. Fecal incontinence noted. Last BM today (small, smear, liquid) per flowsheets.     Weight history per [facility]: 78.9 kg (7/20/21), 79.4 kg (8/16/21), 72.1 kg (9/8/21), 75.3 kg (9/20/21), 78 kg (9/21/21). Dosing weight 82 kg (10/7) and standing weight 80.7 kg (10/7) suggest weight gain. Will continue to monitor and trend weights as able/appropriate.

## 2021-10-11 NOTE — PROGRESS NOTE ADULT - ATTENDING COMMENTS
End stage HF but not a candidate for advanced therapies due to comorbidities. Agree that comfort measures are appropriate at this time.

## 2021-10-11 NOTE — CHART NOTE - NSCHARTNOTEFT_GEN_A_CORE
Discussed with significant other, she is not interested in PCU transfer at this time; will continue to be available for symptom management as needed    ______________  Jairo Benjamin MD   of Geriatric and Palliative Medicine  [facility]     Please page the following number for clinical matters between the hours of 9AM and 5PM   from Monday through Friday : [phone]    After 5PM and on weekends, please page: [phone]. The Geriatric and Palliative Medicine consult service has 24/7 coverage for medical recommendations, including for symptom management needs.

## 2021-10-11 NOTE — PROGRESS NOTE ADULT - PROBLEM SELECTOR PROBLEM 2
Dr. Laurel Lucio
Acute on chronic systolic congestive heart failure
Debility

## 2021-10-11 NOTE — PROGRESS NOTE ADULT - PROBLEM SELECTOR PLAN 4
Not on AC. Thrombocytopenia fluctuates.   Hold full AC for now given plts counts.
- transfusion dependent  - no CBC today in light of GOC
- transfusion dependent  - c/w PRN PRBC transfusions for Hgb <8.
Continue with care as per primary team  Continue with  lasix gtt  Continue with IV pressor  Monitor hemodynamics.

## 2021-10-11 NOTE — CHART NOTE - NSCHARTNOTESELECT_GEN_ALL_CORE
Event Note
Accept/Event Note
Event Note
Event Note
PallCare/Event Note
Pulmonary/Event Note
SOB/Event Note

## 2021-10-11 NOTE — PROGRESS NOTE ADULT - PROBLEM SELECTOR PLAN 2
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  - Advanced therapies: He has clear clinical evidence of advanced disease. His multiple comorbidities which preclude heart tx. His thrombocytopenia, inability to take anticoagulation, RV dysfunction, frailty and h/o infections make him a suboptimal candidate for LVAD.   Palliative consult appreciated

## 2021-10-11 NOTE — PROGRESS NOTE ADULT - PROBLEM SELECTOR PROBLEM 6
Encounter for palliative care
Factor 5 Leiden mutation, heterozygous
Factor 5 Leiden mutation, heterozygous
H/O aspergillosis

## 2021-10-11 NOTE — DIETITIAN INITIAL EVALUATION ADULT. - ENTER FROM (CAL/KG)
Consent: The patient's consent was obtained including but not limited to risks of crusting, scabbing, blistering, scarring, darker or lighter pigmentary change, recurrence, incomplete removal and infection. Render Post-Care Instructions In Note?: no Duration Of Freeze Thaw-Cycle (Seconds): 5 Number Of Freeze-Thaw Cycles: 2 freeze-thaw cycles Post-Care Instructions: I reviewed with the patient in detail post-care instructions. Patient is to wear sunprotection, and avoid picking at any of the treated lesions. Pt may apply Vaseline to crusted or scabbing areas. Aperture Size (Optional): D Detail Level: Simple 25

## 2021-10-11 NOTE — PROGRESS NOTE ADULT - SUBJECTIVE AND OBJECTIVE BOX
DI CONTRERAS  MRN-[id number]  Patient is a 61y old  Male who presents with a chief complaint of Right Heart Cath (10 Oct 2021 21:26)    HPI:  61M with PMHx of chronic systolic heart failure (bivenctricular failure), factor V leiden (not on anticoagulation), prior aspergillosis, COLLEEN (refuses CPAP) and AML (post-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 Patient presenting here for RHC to guide management given severe heart failure. Patient found to be anemia with hemoglobin of 6s (patient's baseline seems to be 9-10). Unable to get RHC due to anemia; thus, transferred to medicine. He was recently admitted to [facility] for falls and started on midodrine to augment BP. Patient seen on 3DSU prior to initiation of transfusions. He is very nonchalant. He has no complaints, shrugs to most of my questions.  (06 Oct 2021 13:48)      24 HOUR EVENTS:    REVIEW OF SYSTEMS:    CONSTITUTIONAL: No weakness, fevers or chills  EYES/ENT: No visual changes;  No vertigo or throat pain   NECK: No pain or stiffness  RESPIRATORY: No cough, wheezing, hemoptysis; No shortness of breath  CARDIOVASCULAR: No chest pain or palpitations  GASTROINTESTINAL: No abdominal or epigastric pain. No nausea, vomiting, or hematemesis; No diarrhea or constipation. No melena or hematochezia.  GENITOURINARY: No dysuria, frequency or hematuria  NEUROLOGICAL: No numbness or weakness  SKIN: No itching, rashes      ICU Vital Signs Last 24 Hrs  T(C): 36.9 (11 Oct 2021 04:00), Max: 37.5 (11 Oct 2021 00:00)  T(F): 98.4 (11 Oct 2021 04:00), Max: 99.5 (11 Oct 2021 00:00)  HR: 106 (11 Oct 2021 06:00) (100 - 111)  BP: --  BP(mean): --  ABP: 95/79 (11 Oct 2021 06:00) (93/94 - 109/70)  ABP(mean): 85 (11 Oct 2021 06:00) (72 - 89)  RR: 27 (11 Oct 2021 06:00) (18 - 65)  SpO2: 92% (11 Oct 2021 06:00) (86% - 97%)      CVP(mm Hg): 68 (10-10-21 @ 07:15) (9 - 68)  CO: --  CI: --  PA: 33/15 (10-09-21 @ 23:00) (31/14 - 41/24)  PA(mean): 25 (10-09-21 @ 23:00) (23 - 34)  PA(direct): --  PCWP: --  LA: --  RA: --  SVR: --  SVRI: --  PVR: --  PVRI: --  I&O's Summary    10 Oct 2021 07:01  -  11 Oct 2021 07:00  --------------------------------------------------------  IN: 1535.4 mL / OUT: 1080 mL / NET: 455.4 mL        CAPILLARY BLOOD GLUCOSE    CAPILLARY BLOOD GLUCOSE      POCT Blood Glucose.: 155 mg/dL (10 Oct 2021 08:00)      PHYSICAL EXAM:  GENERAL: No acute distress, well-developed  HEAD:  Atraumatic, Normocephalic  EYES: EOMI, PERRLA, conjunctiva and sclera clear  NECK: Supple, no lymphadenopathy, no JVD  CHEST/LUNG: CTAB; No wheezes, rales, or rhonchi  HEART: Regular rate and rhythm. Normal S1/S2. No murmurs, rubs, or gallops  ABDOMEN: Soft, non-tender, non-distended; normal bowel sounds, no organomegaly  EXTREMITIES:  2+ peripheral pulses b/l, No clubbing, cyanosis, or edema  NEUROLOGY: A&O x 3, no focal deficits  SKIN: No rashes or lesions    ============================I/O===========================   I&O's Detail    10 Oct 2021 07:01  -  11 Oct 2021 07:00  --------------------------------------------------------  IN:    DOBUTamine: 295.2 mL    Furosemide: 31.2 mL    IV PiggyBack: 345 mL    Norepinephrine: 626.4 mL    Oral Fluid: 180 mL    Vasopressin: 57.6 mL  Total IN: 1535.4 mL    OUT:    Indwelling Catheter - Urethral (mL): 1080 mL  Total OUT: 1080 mL    Total NET: 455.4 mL        ============================ LABS =========================    10-10    136  |  101  |  34<H>  ----------------------------<  156<H>  4.0   |  20<L>  |  1.13    Ca    8.5      10 Oct 2021 16:50                        Blood Gas Arterial, Lactate: 10.3 mmol/L (10-08-21 @ 11:40)  Blood Gas Arterial, Lactate: 5.4 mmol/L (10-08-21 @ 09:35)  Blood Gas Arterial, Lactate: 2.1 mmol/L (10-08-21 @ 07:54)      ======================Micro/Rad/Cardio=================  Telemtry: Reviewed   EKG: Reviewed  CXR: Reviewed  Culture: Reviewed   Echo: Transthoracic Echocardiogram:   Patient name: DI CONTRERAS  YOB: 1960   Age: 61 (M)   MR#: [id number]  Study Date: 10/8/2021  [facility] RDCS  Study quality: Technically difficult  Referring Physician: Marilyn Joshi MD  Blood Pressure: 102/56 mmHg  Height: 185 cm  Weight: 82 kg  BSA: 2.1 m2  ------------------------------------------------------------------------  PROCEDURE: Transthoracic echocardiogram with 2-D, M-Mode  and complete spectral and color flow Doppler.  INDICATION: Shock, unspecified (R57.9)  ------------------------------------------------------------------------  Dimensions:    Normal Values:  LA:     3.5    2.0 - 4.0 cm  Ao:     3.9    2.0 - 3.8 cm  SEPTUM: 0.7    0.6 - 1.2 cm  PWT:    0.8    0.6 - 1.1cm  LVIDd:  7.1    3.0 - 5.6 cm  LVIDs:  6.2    1.8 - 4.0 cm  Derived variables:  LVMI: 123 g/m2  RWT: 0.24  EF (Visual Estimate): 25 %  ------------------------------------------------------------------------  Observations:  Mitral Valve: Normal mitral valve.  Aortic Valve/Aorta: Calcified aortic valve with normal  opening.  Normal aortic root size.  Left Atrium: Moderately dilated left atrium.  Left Ventricle: Severe left ventricular enlargement.  Severely decreased left ventricualr systolic function.  Diffuse hypokinesis.  Right Heart: Normal right atrium.  Right ventricular enlargement with decreased right  ventricular systolic function.  A catheter is noted in the right heart.  Severe tricuspid regurgitation.  Normal pulmonic valve. Mildpulmonic regurgitation.  Pericardium/Pleura: Normal pericardium with no pericardial  effusion.  Left pleural effusion.  Hemodynamic: Cannot estimate pulmonary artery pressure; TR  is too severe.  ------------------------------------------------------------------------  Conclusions:  Sonographer's note: patient / family refused to allow  completion of the study.  Severe left ventricular enlargement.  Severely decreased left ventricualr systolic function.  Diffuse hypokinesis.  Right ventricular enlargement with decreased right  ventricular systolic function.  Severe tricuspid regurgitation.  A catheter is noted in the right heart.  ------------------------------------------------------------------------  Confirmed on  10/8/2021 - 18:02:56 by Ahmet Alonso MD, FASE  ------------------------------------------------------------------------ (10-08-21 @ 13:00)    Cath:   ======================================================  PAST MEDICAL & SURGICAL HISTORY:  Factor 5 Leiden mutation, heterozygous    H/O cardiomyopathy    Deep vein thrombosis (DVT)    Pulmonary embolism    COLLEEN (obstructive sleep apnea)  as per pt. he tried cpap/ bipap in the past did not like it so never used after that.    Thrombocytopenia    History of hypotension    H/O bone marrow transplant       DI CONTRERAS  [id number]  Patient is a 61y old  Male who presents with a chief complaint of Right Heart Cath (10 Oct 2021 21:26)    HPI:  61M with PMHx of chronic systolic heart failure (bivenctricular failure), factor V leiden (not on anticoagulation), prior aspergillosis, COLLEEN (refuses CPAP) and AML (post-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 Patient presenting here for RHC to guide management given severe heart failure. Patient found to be anemia with hemoglobin of 6s (patient's baseline seems to be 9-10). Unable to get RHC due to anemia; thus, transferred to medicine. He was recently admitted to [facility] for falls and started on midodrine to augment BP. Patient seen on 3DSU prior to initiation of transfusions. He is very nonchalant. He has no complaints, shrugs to most of my questions.  (06 Oct 2021 13:48)      24 HOUR EVENTS:  No acute events overnight. Pressors capped.    REVIEW OF SYSTEMS:    Unable to obtain 2/2 patient mental status    ICU Vital Signs Last 24 Hrs  T(C): 36.9 (11 Oct 2021 04:00), Max: 37.5 (11 Oct 2021 00:00)  T(F): 98.4 (11 Oct 2021 04:00), Max: 99.5 (11 Oct 2021 00:00)  HR: 106 (11 Oct 2021 06:00) (100 - 111)  BP: --  BP(mean): --  ABP: 95/79 (11 Oct 2021 06:00) (93/94 - 109/70)  ABP(mean): 85 (11 Oct 2021 06:00) (72 - 89)  RR: 27 (11 Oct 2021 06:00) (18 - 65)  SpO2: 92% (11 Oct 2021 06:00) (86% - 97%)      CVP(mm Hg): 68 (10-10-21 @ 07:15) (9 - 68)  CO: --  CI: --  PA: 33/15 (10-09-21 @ 23:00) (31/14 - 41/24)  PA(mean): 25 (10-09-21 @ 23:00) (23 - 34)  PA(direct): --  PCWP: --  LA: --  RA: --  SVR: --  SVRI: --  PVR: --  PVRI: --  I&O's Summary    10 Oct 2021 07:01  -  11 Oct 2021 07:00  --------------------------------------------------------  IN: 1535.4 mL / OUT: 1080 mL / NET: 455.4 mL        CAPILLARY BLOOD GLUCOSE    CAPILLARY BLOOD GLUCOSE      POCT Blood Glucose.: 155 mg/dL (10 Oct 2021 08:00)      PHYSICAL EXAM:  GENERAL: In mild distress  HEAD:  Atraumatic, Normocephalic  EYES: EOMI, PERRLA, conjunctiva and sclera clear  NECK: Supple, no lymphadenopathy, no JVD  CHEST/LUNG: CTAB; No wheezes, rales, or rhonchi  HEART: Regular rate and rhythm. Normal S1/S2. No murmurs, rubs, or gallops  ABDOMEN: Soft, non-tender, non-distended; normal bowel sounds, no organomegaly  EXTREMITIES:  2+ peripheral pulses b/l, No clubbing, cyanosis, or edema  NEUROLOGY: A&O x 1, no focal deficits  SKIN: No rashes or lesions    ============================I/O===========================   I&O's Detail    10 Oct 2021 07:01  -  11 Oct 2021 07:00  --------------------------------------------------------  IN:    DOBUTamine: 295.2 mL    Furosemide: 31.2 mL    IV PiggyBack: 345 mL    Norepinephrine: 626.4 mL    Oral Fluid: 180 mL    Vasopressin: 57.6 mL  Total IN: 1535.4 mL    OUT:    Indwelling Catheter - Urethral (mL): 1080 mL  Total OUT: 1080 mL    Total NET: 455.4 mL        ============================ LABS =========================    10-10    136  |  101  |  34<H>  ----------------------------<  156<H>  4.0   |  20<L>  |  1.13    Ca    8.5      10 Oct 2021 16:50                        Blood Gas Arterial, Lactate: 10.3 mmol/L (10-08-21 @ 11:40)  Blood Gas Arterial, Lactate: 5.4 mmol/L (10-08-21 @ 09:35)  Blood Gas Arterial, Lactate: 2.1 mmol/L (10-08-21 @ 07:54)      ======================Micro/Rad/Cardio=================  Telemtry: Reviewed   EKG: Reviewed  CXR: Reviewed  Culture: Reviewed   Echo: Transthoracic Echocardiogram:   Patient name: DI CONTRERAS  YOB: 1960   Age: 61 (M)   MR#: [id number]  Study Date: 10/8/2021  [facility]amm[facility] RDCS  Study quality: Technically difficult  Referring Physician: Marilyn Joshi MD  Blood Pressure: 102/56 mmHg  Height: 185 cm  Weight: 82 kg  BSA: 2.1 m2  ------------------------------------------------------------------------  PROCEDURE: Transthoracic echocardiogram with 2-D, M-Mode  and complete spectral and color flow Doppler.  INDICATION: Shock, unspecified (R57.9)  ------------------------------------------------------------------------  Dimensions:    Normal Values:  LA:     3.5    2.0 - 4.0 cm  Ao:     3.9    2.0 - 3.8 cm  SEPTUM: 0.7    0.6 - 1.2 cm  PWT:    0.8    0.6 - 1.1cm  LVIDd:  7.1    3.0 - 5.6 cm  LVIDs:  6.2    1.8 - 4.0 cm  Derived variables:  LVMI: 123 g/m2  RWT: 0.24  EF (Visual Estimate): 25 %  ------------------------------------------------------------------------  Observations:  Mitral Valve: Normal mitral valve.  Aortic Valve/Aorta: Calcified aortic valve with normal  opening.  Normal aortic root size.  Left Atrium: Moderately dilated left atrium.  Left Ventricle: Severe left ventricular enlargement.  Severely decreased left ventricualr systolic function.  Diffuse hypokinesis.  Right Heart: Normal right atrium.  Right ventricular enlargement with decreased right  ventricular systolic function.  A catheter is noted in the right heart.  Severe tricuspid regurgitation.  Normal pulmonic valve. Mildpulmonic regurgitation.  Pericardium/Pleura: Normal pericardium with no pericardial  effusion.  Left pleural effusion.  Hemodynamic: Cannot estimate pulmonary artery pressure; TR  is too severe.  ------------------------------------------------------------------------  Conclusions:  Sonographer's note: patient / family refused to allow  completion of the study.  Severe left ventricular enlargement.  Severely decreased left ventricualr systolic function.  Diffuse hypokinesis.  Right ventricular enlargement with decreased right  ventricular systolic function.  Severe tricuspid regurgitation.  A catheter is noted in the right heart.  ------------------------------------------------------------------------  Confirmed on  10/8/2021 - 18:02:56 by Ahmet Alonso MD,  BRAULIO  ------------------------------------------------------------------------ (10-08-21 @ 13:00)    Cath:   ======================================================  PAST MEDICAL & SURGICAL HISTORY:  Factor 5 Leiden mutation, heterozygous    H/O cardiomyopathy    Deep vein thrombosis (DVT)    Pulmonary embolism    COLLEEN (obstructive sleep apnea)  as per pt. he tried cpap/ bipap in the past did not like it so never used after that.    Thrombocytopenia    History of hypotension    H/O bone marrow transplant

## 2021-10-11 NOTE — CHART NOTE - NSCHARTNOTEFT_GEN_A_CORE
Discussed with primary team. Plan to de-escalate care. No plan for thoracentesis at this time.    Pulmonary to sign off. Please reconsult if further questions    Benjamin Shine MD  PGY-5  PCCM Fellow  Pager [phone]

## 2021-10-11 NOTE — DISCHARGE NOTE FOR THE EXPIRED PATIENT - HOSPITAL COURSE
61M with PMHx of chronic systolic heart failure (bivenctricular failure), factor V leiden (not on anticoagulation), prior aspergillosis, COLLEEN (refuses CPAP) and AML (post-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 Patient presenting here for RHC to guide management given severe heart failure. Patient found to be anemia with hemoglobin of 6s (patient's baseline seems to be 9-10). Unable to get RHC due to anemia, hence the pt was transfused PRBCs. Pt's decompensation of his HF had appeared to worsen despite resolution of anemia, and pt was transferred to the Cardiac ICU. Pt had invasive hemodynamic monitoring placed along with inotropes/pressors initiated. The pt along with his family had agreed upon capped care w/ no blood draws after 10/7 and on 10/11 the decision was made to withdraw all life supporting medications, sign a DNR/DNI and place the pt on comfort measures only. At 11:15pm on 10/11 the pt had ceased to maintain spontaneous circulation, respiration and had .

## 2021-10-11 NOTE — PROGRESS NOTE ADULT - NS MD NEURO CONDITIONS_HEART2
Systolic (HFrEF)
Systolic (HFrEF)/Right Heart Failure
Systolic (HFrEF)

## 2021-10-12 ENCOUNTER — APPOINTMENT (OUTPATIENT)
Dept: HEMATOLOGY ONCOLOGY | Facility: CLINIC | Age: 61
End: 2021-10-12

## 2021-10-13 ENCOUNTER — APPOINTMENT (OUTPATIENT)
Dept: INFUSION THERAPY | Facility: HOSPITAL | Age: 61
End: 2021-10-13

## 2021-10-13 LAB — GALACTOMANNAN AG SERPL-ACNC: 0.03 INDEX — SIGNIFICANT CHANGE UP (ref 0–0.49)

## 2021-10-25 ENCOUNTER — RX RENEWAL (OUTPATIENT)
Age: 61
End: 2021-10-25

## 2021-10-25 ENCOUNTER — APPOINTMENT (OUTPATIENT)
Dept: HEART FAILURE | Facility: CLINIC | Age: 61
End: 2021-10-25

## 2021-10-26 ENCOUNTER — APPOINTMENT (OUTPATIENT)
Dept: CARDIOLOGY | Facility: CLINIC | Age: 61
End: 2021-10-26

## 2021-10-26 PROCEDURE — 83880 ASSAY OF NATRIURETIC PEPTIDE: CPT

## 2021-10-26 PROCEDURE — 83735 ASSAY OF MAGNESIUM: CPT

## 2021-10-26 PROCEDURE — 86850 RBC ANTIBODY SCREEN: CPT

## 2021-10-26 PROCEDURE — C1889: CPT

## 2021-10-26 PROCEDURE — 86923 COMPATIBILITY TEST ELECTRIC: CPT

## 2021-10-26 PROCEDURE — 82803 BLOOD GASES ANY COMBINATION: CPT

## 2021-10-26 PROCEDURE — 81003 URINALYSIS AUTO W/O SCOPE: CPT

## 2021-10-26 PROCEDURE — 36415 COLL VENOUS BLD VENIPUNCTURE: CPT

## 2021-10-26 PROCEDURE — 83605 ASSAY OF LACTIC ACID: CPT

## 2021-10-26 PROCEDURE — 85014 HEMATOCRIT: CPT

## 2021-10-26 PROCEDURE — 85025 COMPLETE CBC W/AUTO DIFF WBC: CPT

## 2021-10-26 PROCEDURE — 84295 ASSAY OF SERUM SODIUM: CPT

## 2021-10-26 PROCEDURE — 85610 PROTHROMBIN TIME: CPT

## 2021-10-26 PROCEDURE — 82947 ASSAY GLUCOSE BLOOD QUANT: CPT

## 2021-10-26 PROCEDURE — 82435 ASSAY OF BLOOD CHLORIDE: CPT

## 2021-10-26 PROCEDURE — 85018 HEMOGLOBIN: CPT

## 2021-10-26 PROCEDURE — 86769 SARS-COV-2 COVID-19 ANTIBODY: CPT

## 2021-10-26 PROCEDURE — 87449 NOS EACH ORGANISM AG IA: CPT

## 2021-10-26 PROCEDURE — 85730 THROMBOPLASTIN TIME PARTIAL: CPT

## 2021-10-26 PROCEDURE — 86985 SPLIT BLOOD OR PRODUCTS: CPT

## 2021-10-26 PROCEDURE — 84100 ASSAY OF PHOSPHORUS: CPT

## 2021-10-26 PROCEDURE — 80048 BASIC METABOLIC PNL TOTAL CA: CPT

## 2021-10-26 PROCEDURE — 86901 BLOOD TYPING SEROLOGIC RH(D): CPT

## 2021-10-26 PROCEDURE — 85045 AUTOMATED RETICULOCYTE COUNT: CPT

## 2021-10-26 PROCEDURE — 85027 COMPLETE CBC AUTOMATED: CPT

## 2021-10-26 PROCEDURE — 80202 ASSAY OF VANCOMYCIN: CPT

## 2021-10-26 PROCEDURE — 85379 FIBRIN DEGRADATION QUANT: CPT

## 2021-10-26 PROCEDURE — 82962 GLUCOSE BLOOD TEST: CPT

## 2021-10-26 PROCEDURE — P9011: CPT

## 2021-10-26 PROCEDURE — C1894: CPT

## 2021-10-26 PROCEDURE — 71045 X-RAY EXAM CHEST 1 VIEW: CPT

## 2021-10-26 PROCEDURE — 87150 DNA/RNA AMPLIFIED PROBE: CPT

## 2021-10-26 PROCEDURE — 82330 ASSAY OF CALCIUM: CPT

## 2021-10-26 PROCEDURE — 36430 TRANSFUSION BLD/BLD COMPNT: CPT

## 2021-10-26 PROCEDURE — 87641 MR-STAPH DNA AMP PROBE: CPT

## 2021-10-26 PROCEDURE — 85384 FIBRINOGEN ACTIVITY: CPT

## 2021-10-26 PROCEDURE — 93451 RIGHT HEART CATH: CPT

## 2021-10-26 PROCEDURE — 94660 CPAP INITIATION&MGMT: CPT

## 2021-10-26 PROCEDURE — 83010 ASSAY OF HAPTOGLOBIN QUANT: CPT

## 2021-10-26 PROCEDURE — 80053 COMPREHEN METABOLIC PANEL: CPT

## 2021-10-26 PROCEDURE — 84145 PROCALCITONIN (PCT): CPT

## 2021-10-26 PROCEDURE — 83615 LACTATE (LD) (LDH) ENZYME: CPT

## 2021-10-26 PROCEDURE — 87640 STAPH A DNA AMP PROBE: CPT

## 2021-10-26 PROCEDURE — 87186 SC STD MICRODIL/AGAR DIL: CPT

## 2021-10-26 PROCEDURE — 93005 ELECTROCARDIOGRAM TRACING: CPT

## 2021-10-26 PROCEDURE — 86900 BLOOD TYPING SEROLOGIC ABO: CPT

## 2021-10-26 PROCEDURE — 84132 ASSAY OF SERUM POTASSIUM: CPT

## 2021-10-26 PROCEDURE — C1751: CPT

## 2021-10-26 PROCEDURE — 87040 BLOOD CULTURE FOR BACTERIA: CPT

## 2021-10-26 PROCEDURE — 87305 ASPERGILLUS AG IA: CPT

## 2021-10-26 PROCEDURE — 93306 TTE W/DOPPLER COMPLETE: CPT

## 2021-11-06 NOTE — DIETITIAN INITIAL EVALUATION ADULT. - CALCULATED FROM (CAL/KG)
Group Therapy Note    Date: 11/6/2021    Group Start Time: 1400  Group End Time: 1430  Group Topic: Psychoeducation    [facility]    FRANKLIN Drake LSW        Group Therapy Note    patient refused to attend psychoeducational group at 14:00 after encouragement from staff.        Signature:  FRANKLIN Drake LSW 2017

## 2021-12-26 ENCOUNTER — RX RENEWAL (OUTPATIENT)
Age: 61
End: 2021-12-26

## 2022-01-01 NOTE — DISCHARGE NOTE PROVIDER - NSDCCPCAREPLAN_GEN_ALL_CORE_FT
Patient here for psych eval.  Patient was brought in by EMS after she made some blanket statements to sister about not wanting to be alive. Patient's sister called thien LOMAX.   Patient went to a hotel to get away from family and was sleeping when PD k
PRINCIPAL DISCHARGE DIAGNOSIS  Diagnosis: Pneumonia, bacterial  Assessment and Plan of Treatment: Pneumonia is a lung infection that can cause a fever, cough, and trouble breathing.  Continue all antibiotics as ordered until complete.  Nutrition is important, eat small frequent meals.  Get lots of rest and drink fluids.  Call your health care provider upon arrival home from hospital and make a follow up appointment for one week.  If your cough worsens, you develop fever greater than 101', you have shaking chills, a fast heartbeat, trouble breathing and/or feel your are breathing much faster than usual, call your healthcare provider.  Make sure you wash your hands frequently.        SECONDARY DISCHARGE DIAGNOSES  Diagnosis: Chronic systolic heart failure  Assessment and Plan of Treatment: Weigh yourself daily.  If you gain 3lbs in 3 days, or 5lbs in a week call your Health Care Provider.  Do not eat or drink foods containing more than 2000mg of salt (sodium) in your diet every day.  Call your Health Care Provider if you have any swelling or increased swelling in your feet, ankles, and/or stomach.  Take all of your medication as directed.  If you become dizzy call your Health Care Provider.  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  -continue home mepron, vori, and valganciclovir   - CMV PCR negative  -continue home prednisone  -cleared by primary hematologist for discharge.   F/u with Dr Gaytan next Tuesday

## 2022-02-15 NOTE — HISTORY OF PRESENT ILLNESS
When Outside In The Sun, Do You...: mostly burns, rarely tans Additional History: Pt ntrested in glabella, forehead, crows [FreeTextEntry1] : Young presents for evaluation of cardiomyopathy. He has had a complicated medical history and is now s/p bone marrow transplant. He has been treated for AML including consolidation therapy in July of last year. He developed osteomyelitis of the right toe for which he underwent amputation. He has a known cardiomyopathy but had no cardiac complications throughout his hospitalization. He did have neutropenic fevers but no heart failure, dysrhythmias, or other cardiac problems. He is feeling well today and has no specific complaints.\par \par Since 7/19 LV systolic function has been deteriorating. LVEF from 40% to 20%. MR scan of the heart 9/19 demonstrated ejection fraction of 24%. Recently the patient has developed increasing water retention with weight gain, increasing shortness of breath. His Lasix was doubled and he has lost about 6 pounds of water weight.\par \par His medicines have been limited by hypotension.  He is now taking Lasix 20 mg twice a day, and carvedilol 2.25 mg twice a day.  Not able to take the valsartan because of hypotension.\par \par Past medical is remarkable for factor V Leiden mutation known in the family, history of DVT, pulmonary embolism, hypertension, tachycardia\par \par Social history is remarkable for smoking for approximately 45 years, stopped last year.  He has worked as a . The patient had been drinking alcohol which he stopped and unfortunately is having a high salt diet\par \par Family history is notable for both parents dying together in an accident. His father had successful bypass surgery at an older age\par \par I discussed his case with both electrophysiologist and the oncologist. Because of his present blood counts he is not considered an ideal candidate for any device or intervention. We are treating him medically.\par \par   His recent blood work 3/21 demonstrated a hemoglobin of 8.7 with hematocrit 27.  Creatinine 1.27, BUN 18, potassium 3.5.

## 2022-02-23 NOTE — H&P ADULT - NSCORESITESY/N_GEN_A_CORE_RD
Requested medication(s) are due for refill today: Yes  Patient has already received a courtesy refill: No  Other reason request has been forwarded to provider: fail No

## 2022-03-22 NOTE — ED PROVIDER NOTE - CONSTITUTIONAL, MLM
normal appearance , without tenderness upon palpation , no deformities , trachea midline , Thyroid normal size , no thyroid nodules , no masses , no JVD , thyroid nontender
- - -

## 2022-05-11 ENCOUNTER — NON-APPOINTMENT (OUTPATIENT)
Age: 62
End: 2022-05-11

## 2022-05-30 NOTE — PATIENT PROFILE ADULT - SAFE PLACE TO LIVE
This is felt to be secondary to hypotension  We are given IV fluids and also transfusing packed red blood cells  No focal deficits  Plan to check a CT head once patient is more stable  Neuro checks per protocol  5/30 CT head with no acute process  Mental status improved with bp improvement  Now likely at his baseline   no

## 2022-06-16 ENCOUNTER — NON-APPOINTMENT (OUTPATIENT)
Age: 62
End: 2022-06-16

## 2022-06-20 NOTE — PATIENT PROFILE ADULT - NSPROGENBLOODRESTRICT_GEN_A_NUR
Guzman 28, RN  06/20/22 1545
Pt wears 2L oxygen at home PRN or at night. RN placed 2L NC on patient d/t low oxygen sat of 88% on RA.      Crystal Coombs RN  06/20/22 0010
Report called to 3rd floor RN.      Clemente Fortune RN  06/20/22 5979
none

## 2022-07-03 NOTE — ED PROVIDER NOTE - NS ED ATTENDING STATEMENT MOD
Take medication as directed and follow-up with your primary doctor for further evaluation of your symptoms.  Return to the emergency room for worsening condition.   I have personally seen and examined this patient.  I have fully participated in the care of this patient. I have reviewed all pertinent clinical information, including history, physical exam, plan and the Resident’s note and agree except as noted.

## 2023-02-19 NOTE — ED ADULT NURSE NOTE - NS ED NURSE DISCH DISPOSITION
Uncontrolled  No Red Flags  Has not been taking blood pressure medication - last prescription 30 day supply in Jan 2022  No recent BMP, did not return to clinic after last visit with PCP to have labs drawn per plan  - advised that I would not be able to provide more than a week's supply without labs as last renal function evaluation was in 2019.  - agreed to have labs drawn today  - advised that due to length of time he has been off medication, will start at a lower dose and will likely need to increase, stressed importance of return to clinic   Discharged

## 2023-03-29 NOTE — PATIENT PROFILE ADULT - BRADEN NUTRITION
Chief Complaint   Patient presents with    Finger Swelling    Knee Pain     1. \"Have you been to the ER, urgent care clinic since your last visit? Hospitalized since your last visit? \" No    2. \"Have you seen or consulted any other health care providers outside of the [address] [place] [address] since your last visit? \" No     3. For patients aged 39-70: Has the patient had a colonoscopy / FIT/ Cologuard? NA - based on age      If the patient is female:    4. For patients aged 41-77: Has the patient had a mammogram within the past 2 years? NA - based on age or sex      11. For patients aged 21-65: Has the patient had a pap smear?  NA - based on age or sex (3) adequate

## 2023-03-31 NOTE — DISCHARGE NOTE PROVIDER - HOSPITAL COURSE
SOB - COVID 19 negative x 4, bacterial pneumonia finished antibiotic treatment yesterday 4/28, history of AML w/ recent bone marrow transplant & immunosuppressed        60 m with HTN, AML s/p bone marrow transplant 10/2019, DVT/PE (not on AC due to thrombocytopenia),  factor V Leiden and HFrEF, BIBEMS for dyspnea was hypotensive and required levophed by EMS but responded to fluids    today afebrile ,    leukocytosis  improved  after antibiotics    COVID negative x 4    CXR: b/l opacities, R>L    Bld and Ucxs neg        # hypoxic respiratory failure and b/l pneumonia but COVID negative x 3 ?false neg and/or  ?bacterial pneumonia in the setting on AML s/o BMT 10/2019, WBC improved to normal on antibiotics    urine legionella negative, MRSA swab negative, blood cx negative    -ID and hematology consult appreciated     -QTc prolong and pt also on voriconazole,So Plaquenil discontinued QTC today 525     -completed vanco and cefepime 4/28    --c/w mepron, voriconazole and acyclovir ppx    -slowly improving Hypoxia requiring O2 supplement - saturating at 91 to 96 on 4 LNC monitor the O2sat goal 92-96 %-- titrate oxygen a tolerated down -O2 sat at rest on room air (92%), O2 sat w/ ambulation on room air (94%) , O2 sat w/ ambulation on oxygen (95%) - patient will pay for own home oxygen and advised to search on internet since no surgical supply company [facility] uses willing to do this option    -- advised pt out of bed to chair as tolerated     -- pt evaluation appreciated     --d/c planning         #loss of appetite- improving -encouraged ensure - pt  requested Chocolate flavor         DVT prophylaxis - not on anticoagulation due to thrombocytopenia- advised leg and foot movements OOB to chair encouraged         Spoke with Pt Emergency contact -Roommate Roger (nurse)  on phone update pt condition 4/25th , called left message x 2 on 4/26th , spoke with Roger updated pt condition afternoon 1 pm
Detail Level: Detailed

## 2023-04-24 ENCOUNTER — NON-APPOINTMENT (OUTPATIENT)
Age: 63
End: 2023-04-24

## 2023-06-12 NOTE — PROGRESS NOTE ADULT - PROBLEM SELECTOR PROBLEM 8
GERIATRIC MEDICINE HISTORY AND PHYSICAL   Maureen Green   61 year old female   MRN: [id number]   : 1962     CHIEF COMPLAINT:   Chief Complaint   Patient presents with   • Medicare Wellness Visit     New wheel chair and cushion     HISTORY OF PRESENT ILLNESS:   Maureen Green, 61 year old, comes in today for routine follow-up, prescription for wheelchair and cushion and did Medicare wellness visit today as well.  Maureen has a history of hyperlipidemia, sensorineural hearing loss, GERD, neurogenic bladder with cystostomy and indwelling catheter, attention deficit disorder, major depressive disorder, chronic pain syndrome, multiple sclerosis, trigeminal neuralgia and tobacco use.  She is in her wheelchair permanently due to her multiple sclerosis.  She does need a new wheelchair in pressure reducing cushion to try and reduce likelihood of pressure ulcers and in order for her to safely get around.  She is able to use the wheelchair on her own and she also has assistance but with others to help with the wheelchair.  She also states that she had had some diarrhea and she is out of her Pepto-Bismol so she needs some refill on that.  She also has a triamcinolone cream which is not quite strong enough for the area on the back of her neck where she gets inflammation.  We did increase the dose of the triamcinolone cream as well.  She is due for colon cancer screening and is willing to get an iFOB but would not do any other cancer screening.  She refuses mammogram and CT lung screening at this time.  Did educate regarding the CT lung screening but she refused this.  She does have nurse that helps with the catheter.  She denies any new pain issues since last visit.  We did increase her gabapentin  and that seems to have helped.  She does note however that she has chronic muscle spasms and cramping in her foot.  This is not been helped by the muscle relaxant that she is on.  She had been on low dose clonazepam in the  past for this.  It is very uncomfortable and interferes with her sleep.    REVIEW OF SYSTEMS:   As per HPI    ALLERGIES:  Patient has no known allergies.     MEDICATIONS:   Current Outpatient Medications   Medication Sig Dispense Refill   • bisacodyl (DULCOLAX) 10 MG suppository Place 10 mg rectally.     • CARBOXYMethylcellulose (REFRESH TEARS) 0.5 % ophthalmic solution 1 drop.     • DISPENSE Wheel chair. 1 each 0   • DISPENSE Pressure reduction cushion 1 each 0   • bismuth subsalicylate (Stomach Relief) 262 MG/15ML suspension TAKE 30mL BY MOUTH EVERY 3 HOURS AS NEEDED FOR DIARRHEA/UPSET STOMACH. MAX 8 DOSES/ 24 HRS. 237 mL 2   • triamcinolone (KENALOG) 0.5 % cream Apply topically 2 times daily. 30 g 0   • clonazePAM (KlonoPIN) 0.5 MG tablet Take 0.5 tablets by mouth nightly as needed for Anxiety. 15 tablet 0   • Citric Rj-Hjxhyicxjbt-Kx Carb (gluconic acid-citric acid) irrigation solution INSTILL 30ML INTO CATHETER WEEKLY - RETAIN FOR 15 MINUTES, THEN ALLOW TO DRAIN FROM CATHETER (TO PREVENT CATHETER BLOCKAGE) 900 mL 5   • baclofen (LIORESAL) 10 MG tablet Take 1 tablet by mouth in the morning and 1 tablet at noon and 1 tablet in the evening. 65 tablet 11   • atorvastatin (LIPITOR) 20 MG tablet Take 1 tablet by mouth daily at bedtime for hyperlipidemia 31 tablet 11   • Multiple Vitamins-Minerals (CertaVite/Antioxidants) Tab Take 1 tablet by mouth daily. 90 tablet 3   • mirtazapine (REMERON) 7.5 MG tablet TAKE 1 TABLET BY MOUTH DAILY AT BEDTIME. 28 tablet 11   • lisinopril (ZESTRIL) 10 MG tablet Take 1 tablet by mouth daily. 90 tablet 3   • cetirizine (ZyrTEC) 10 MG tablet Take 1 tablet by mouth daily. 90 tablet 3   • famotidine (PEPCID) 10 MG tablet Take 1 tablet by mouth in the morning and 1 tablet in the evening. 180 tablet 3   • melatonin 5 MG Take 2 tablets by mouth nightly. 180 tablet 3   • Ascorbic Acid (Vitamin C) 500 MG Cap Take 500 mg by mouth daily. 90 capsule 3   • citalopram (CeleXA) 40 MG tablet Take 1  tablet by mouth daily. 90 tablet 3   • meclizine (ANTIVERT) 12.5 MG tablet TAKE 1 TABLET BY MOUTH DAILY 28 tablet 11   • gabapentin (NEURONTIN) 300 MG capsule Decrease to 300 mg three times daily for one week then 300 mg twice daily for 1 week then 300 mg daily for one week then stop 42 capsule 0   • Vitamin D, Ergocalciferol, 1.25 mg (50,000 units) capsule Take 1 capsule by mouth 1 day a week. 8 capsule 0   • nystatin (MYCOSTATIN) 221590 UNIT/GM powder Apply topically to affected area two times daily and as needed 60 g 0   • phenytoin (DILANTIN) 300 MG ER capsule Take 1 capsule by mouth daily. At Bedtime for Trigeminal Neuralgia 30 capsule 2   • Lactobacillus (Probiotic Acidophilus) Cap      • Petrolatum-Zinc Oxide (SENSI-CARE PROTECTIVE BARRIER EX) Apply topically 3 times daily as needed (protectant).     • Aspercreme Lidocaine 4 % cream Apply topically 3 times daily as needed (pain).     • Water For Irrigation, Sterile (STERILE WATER FOR IRRIGATION IR) 300 ml bladder irrigation daily     • trolamine salicylate (ASPERCREME) 10 % cream Apply topically as needed for Pain. 85 g 2   • magnesium hydroxide (Milk of Magnesia) 400 MG/5ML suspension Take 30 mLs by mouth daily as needed (constipation). 360 mL 1   • acetaminophen (TYLENOL) 325 MG tablet TAKE 2 TABLETS (650 MG) BY MOUTH EVERY FOUR HOURS AS NEEDED FOR FEVER. MAX 4GM PER DAY 60 tablet 11   • Skin Protectants, Misc. (eucerin) cream APPLY TOPICALLY TO AFFECTED AREA TWICE DAILY AS NEEDED 99 g 1   • Nutritional Supplements (Ensure Plus) Liquid CHOCOLATE. 1 CAN 3 TIMES DAILY AS NEEDED 93 each 2   • acetaminophen (TYLENOL) 650 MG suppository Place 1 suppository rectally every 4 hours as needed for Fever. 12 each 1   • naproxen sodium (ALEVE) 220 MG tablet TAKE 1 TABLET BY MOUTH EVERY 12 HOURS AS NEEDED FOR PAIN. 60 tablet 1   • propylene glycol (Systane Complete) 0.6 % Solution Use 1 drop in both eyes up to 6 times per day as needed for dryness - bedside allowed. 10  mL 2   • Eye Patches (Opticlude Eye Patch Regular) Misc Use eye patch as needed to occlude eye when diplopia symptoms are bothersome. May occlude either right or left eye. 1 each 3     No current facility-administered medications for this visit.         PAST MEDICAL HISTORY:   Past Medical History:   Diagnosis Date   • ADD (attention deficit disorder)    • Anxiety    • Arthritis    • Chronic leg pain    • Dizziness    • GERD (gastroesophageal reflux disease)    • Hearing loss of both ears     deaf at 46   • High cholesterol    • Hypotonic neurogenic bladder    • Migraines    • Multiple sclerosis (CMD)    • Neurogenic bowel    • Trigeminal neuralgia           PAST SURGICAL HISTORY:   Past Surgical History:   Procedure Laterality Date   • Breast lumpectomy Left    • Suprapubic tube placement     • Tonsillectomy            FAMILY HISTORY:   Family History   Problem Relation Age of Onset   • Diabetes Mother    • Cancer Mother    • Cataracts Father    • Hearing Loss Father         deaf          PERSONAL AND SOCIAL HISTORY:   Social History     Socioeconomic History   • Marital status:      Spouse name: Not on file   • Number of children: Not on file   • Years of education: Not on file   • Highest education level: Not on file   Occupational History   • Not on file   Tobacco Use   • Smoking status: Every Day     Current packs/day: 0.50     Average packs/day: 0.5 packs/day for 48.2 years (24.1 pk-yrs)     Types: Cigarettes     Start date: 4/6/1975   • Smokeless tobacco: Former   • Tobacco comments:     6-12 cigs per day depending on the season   Vaping Use   • Vaping Use: Some days   Substance and Sexual Activity   • Alcohol use: Not Currently   • Drug use: No   • Sexual activity: Not Currently   Other Topics Concern   • Not on file   Social History Narrative   • Not on file     Social Determinants of Health     Financial Resource Strain: Low Risk  (8/12/2022)    Financial Resource Strain    • Social Determinants:  Financial Resource Strain: None   Food Insecurity: No Food Insecurity (9/28/2022)    Food Insecurity    • Social Determinants: Food Insecurity: Never   Transportation Needs: No Transportation Needs (7/27/2022)    PRAPARE - Transportation    • Lack of Transportation (Medical): No    • Lack of Transportation (Non-Medical): No   Physical Activity: Not on file   Stress: Not on file   Social Connections: Socially Isolated (7/27/2022)    Social Connections    • Social Determinants: Social Connections: Less than once a week   Intimate Partner Violence: Not At Risk (9/24/2022)    Intimate Partner Violence    • Social Determinants: Intimate Partner Violence Past Fear: No    • Social Determinants: Intimate Partner Violence Current Fear: No          PHYSICAL EXAMINATION:   VITALS:   Visit Vitals  /60   Pulse 78   Ht 5' (1.524 m)   SpO2 99%   BMI 23.63 kg/m²        Wt Readings from Last 4 Encounters:   10/07/22 54.9 kg (121 lb)   09/24/22 60.9 kg (134 lb 4.2 oz)   09/23/22 57.3 kg (126 lb 5.2 oz)   07/18/22 82.7 kg (182 lb 5.1 oz)           GEN: AAO x 3. No acute pain or distress.   CHEST:  B/L clear. No rhonchi or crepts. Breathing comfortably.   CVS:  RRR. No gallops, rub or murmur.   ABD:  BS present, soft, and nontender. No hepatosplenomegaly or ascites.   EXT:  No edema. Pedal pulses present B/L equal.   PSYCH:  Mood and affect is good.   GAIT: not ambulatory      Recent Review Flowsheet Data     Date 6/12/2023    Adult PHQ 2 Score 1    Adult PHQ 2 Interpretation No further screening needed    Little interest or pleasure in activity? Not at all    Feeling down, depressed or hopeless? Several days    Adult PHQ 9 Score 1    Adult PHQ 9 Interpretation Minimal Depression    Trouble falling or staying asleep or sleeping all the time? Not at all    Feeling tired or having little energy? Not at all    Poor appetite or overeating? Not at all    Feeling bad about yourself or that you are a failure or have let yourself or  family down? Not at all    Trouble concentrating on things such as reading the newspaper or watching TV? Not at all    Moving or speaking slowly that other people have noticed or the opposite - being so fidgety or restless that you have been moving around a lot more than usual? Not at all    Thoughts that you would be better off dead or of hurting yourself in some way? Not at all    If you reported any problems, how difficult have these problems made it to do your work, take care of things at home, or get along with other people? Somewhat difficult          ASSESSMENT:   ED Diagnosis   1. Medicare annual wellness visit, subsequent   - Subsequent Medicare Wellness Visit - Select if billing add'l E/M      2. Colon cancer screening  Occult Blood - iFOB (aka FIT)      3. Encounter for screening for lung cancer  Counseling Visit to Discuss Need For Lung Cancer Screening      4. Encounter for attention to cystostomy (CMD)  stable, continue current managment        5. Major depressive disorder, recurrent episode, in partial remission (CMD)  stable, continue current managment        6. Nicotine dependence, cigarettes, uncomplicated  Counseling Visit to Discuss Need For Lung Cancer Screening      7. Mixed hyperlipidemia  Lipid Panel Without Reflex      8. Multiple sclerosis (CMD)  CBC with Automated Differential    Comprehensive Metabolic Panel              PLAN:   Patient Instructions     Pepto-Bismol for diarrhea    Trimacinolone 0.5% cream for back of neck along with Head and Shoulders shampoo    Clonazepam 0.25 mg at night as needed for foot spasms due to MS    New wheelchair and cushion     Stool for blood    Annual Labs    Follow up for MWV in one year     I spent a total of 35 minutes on the day of the visit.  This includes pre-charting, chart review, documenting and counseling.    This does not include time spent on Medicare Wellness Visit.          Continue all medications except as per above.     MEDICARE  WELLNESS VISIT NOTE    HISTORY OF PRESENT ILLNESS:   Maureen Green presents for her Subsequent Annual Medicare Wellness Visit.   She has complaints or concerns which include as above.      Patient Care Team:  Janell Wynn MD as PCP - General (Family Medicine - Geriatric Medicine)        Patient Active Problem List   Diagnosis   • Chronic pain syndrome   • Neurogenic bladder   • Trigeminal neuralgia   • Major depressive disorder, recurrent episode, in partial remission (CMD)   • Cigarette nicotine dependence without complication   • Primary insomnia   • ADD (attention deficit disorder)   • Multiple sclerosis (CMD)   • GERD (gastroesophageal reflux disease)   • Mixed hyperlipidemia   • Controlled substance agreement signed   • Nuclear sclerosis of both eyes   • Dry eye syndrome   • Myopia with astigmatism and presbyopia   • Exotropia, alternating   • Strabismic amblyopia of right eye   • Positive fecal occult blood test   • Acute cystitis   • Sensorineural hearing loss   • Abscess of axilla, right   • Folliculitis of right axilla   • Severe sepsis (CMD)         Past Medical History:   Diagnosis Date   • ADD (attention deficit disorder)    • Anxiety    • Arthritis    • Chronic leg pain    • Dizziness    • GERD (gastroesophageal reflux disease)    • Hearing loss of both ears     deaf at 46   • High cholesterol    • Hypotonic neurogenic bladder    • Migraines    • Multiple sclerosis (CMD)    • Neurogenic bowel    • Trigeminal neuralgia          Past Surgical History:   Procedure Laterality Date   • Breast lumpectomy Left    • Suprapubic tube placement     • Tonsillectomy           Social History     Tobacco Use   • Smoking status: Every Day     Current packs/day: 0.50     Average packs/day: 0.5 packs/day for 48.2 years (24.1 pk-yrs)     Types: Cigarettes     Start date: 4/6/1975   • Smokeless tobacco: Former   • Tobacco comments:     6-12 cigs per day depending on the season   Vaping Use   • Vaping Use: Some  days   Substance Use Topics   • Alcohol use: Not Currently   • Drug use: No     Drug use:    Drug Use:    No              Family History   Problem Relation Age of Onset   • Diabetes Mother    • Cancer Mother    • Cataracts Father    • Hearing Loss Father         deaf       Current Outpatient Medications   Medication Sig Dispense Refill   • bisacodyl (DULCOLAX) 10 MG suppository Place 10 mg rectally.     • CARBOXYMethylcellulose (REFRESH TEARS) 0.5 % ophthalmic solution 1 drop.     • DISPENSE Wheel chair. 1 each 0   • DISPENSE Pressure reduction cushion 1 each 0   • bismuth subsalicylate (Stomach Relief) 262 MG/15ML suspension TAKE 30mL BY MOUTH EVERY 3 HOURS AS NEEDED FOR DIARRHEA/UPSET STOMACH. MAX 8 DOSES/ 24 HRS. 237 mL 2   • triamcinolone (KENALOG) 0.5 % cream Apply topically 2 times daily. 30 g 0   • clonazePAM (KlonoPIN) 0.5 MG tablet Take 0.5 tablets by mouth nightly as needed for Anxiety. 15 tablet 0   • Citric Ev-Lbbjbswvpnk-Ct Carb (gluconic acid-citric acid) irrigation solution INSTILL 30ML INTO CATHETER WEEKLY - RETAIN FOR 15 MINUTES, THEN ALLOW TO DRAIN FROM CATHETER (TO PREVENT CATHETER BLOCKAGE) 900 mL 5   • baclofen (LIORESAL) 10 MG tablet Take 1 tablet by mouth in the morning and 1 tablet at noon and 1 tablet in the evening. 65 tablet 11   • atorvastatin (LIPITOR) 20 MG tablet Take 1 tablet by mouth daily at bedtime for hyperlipidemia 31 tablet 11   • Multiple Vitamins-Minerals (CertaVite/Antioxidants) Tab Take 1 tablet by mouth daily. 90 tablet 3   • mirtazapine (REMERON) 7.5 MG tablet TAKE 1 TABLET BY MOUTH DAILY AT BEDTIME. 28 tablet 11   • lisinopril (ZESTRIL) 10 MG tablet Take 1 tablet by mouth daily. 90 tablet 3   • cetirizine (ZyrTEC) 10 MG tablet Take 1 tablet by mouth daily. 90 tablet 3   • famotidine (PEPCID) 10 MG tablet Take 1 tablet by mouth in the morning and 1 tablet in the evening. 180 tablet 3   • melatonin 5 MG Take 2 tablets by mouth nightly. 180 tablet 3   • Ascorbic Acid  (Vitamin C) 500 MG Cap Take 500 mg by mouth daily. 90 capsule 3   • citalopram (CeleXA) 40 MG tablet Take 1 tablet by mouth daily. 90 tablet 3   • meclizine (ANTIVERT) 12.5 MG tablet TAKE 1 TABLET BY MOUTH DAILY 28 tablet 11   • gabapentin (NEURONTIN) 300 MG capsule Decrease to 300 mg three times daily for one week then 300 mg twice daily for 1 week then 300 mg daily for one week then stop 42 capsule 0   • Vitamin D, Ergocalciferol, 1.25 mg (50,000 units) capsule Take 1 capsule by mouth 1 day a week. 8 capsule 0   • nystatin (MYCOSTATIN) 550763 UNIT/GM powder Apply topically to affected area two times daily and as needed 60 g 0   • phenytoin (DILANTIN) 300 MG ER capsule Take 1 capsule by mouth daily. At Bedtime for Trigeminal Neuralgia 30 capsule 2   • Lactobacillus (Probiotic Acidophilus) Cap      • Petrolatum-Zinc Oxide (SENSI-CARE PROTECTIVE BARRIER EX) Apply topically 3 times daily as needed (protectant).     • Aspercreme Lidocaine 4 % cream Apply topically 3 times daily as needed (pain).     • Water For Irrigation, Sterile (STERILE WATER FOR IRRIGATION IR) 300 ml bladder irrigation daily     • trolamine salicylate (ASPERCREME) 10 % cream Apply topically as needed for Pain. 85 g 2   • magnesium hydroxide (Milk of Magnesia) 400 MG/5ML suspension Take 30 mLs by mouth daily as needed (constipation). 360 mL 1   • acetaminophen (TYLENOL) 325 MG tablet TAKE 2 TABLETS (650 MG) BY MOUTH EVERY FOUR HOURS AS NEEDED FOR FEVER. MAX 4GM PER DAY 60 tablet 11   • Skin Protectants, Misc. (eucerin) cream APPLY TOPICALLY TO AFFECTED AREA TWICE DAILY AS NEEDED 99 g 1   • Nutritional Supplements (Ensure Plus) Liquid CHOCOLATE. 1 CAN 3 TIMES DAILY AS NEEDED 93 each 2   • acetaminophen (TYLENOL) 650 MG suppository Place 1 suppository rectally every 4 hours as needed for Fever. 12 each 1   • naproxen sodium (ALEVE) 220 MG tablet TAKE 1 TABLET BY MOUTH EVERY 12 HOURS AS NEEDED FOR PAIN. 60 tablet 1   • propylene glycol (Systane  Complete) 0.6 % Solution Use 1 drop in both eyes up to 6 times per day as needed for dryness - bedside allowed. 10 mL 2   • Eye Patches (Opticlude Eye Patch Regular) Misc Use eye patch as needed to occlude eye when diplopia symptoms are bothersome. May occlude either right or left eye. 1 each 3     No current facility-administered medications for this visit.        The following items on the Medicare Health Risk Assessment were found to be positive  4.) During the past 4 weeks, what was the hardest physical activity you could do for at least 2 minutes?: Very Light     5.) Do you do moderate to strenuous exercise (brisk walk) for about 20 minutes for 3 or more days per week?: No, I usually do not exercise this much     6 d.) How many servings of Sugar Sweetened Beverages do you have each day ( 1 serving = 1 can or 12 oz cup of sode or juice): 2 per day     11h.) Problems with your hearing: Always     11i.) Problems using the telephone: Always     12.) Do you need help with any of the following activities?   (check all that apply): Bathing, Dressing and grooming, Using the Toilet, Getting in and out of bed or chairs     13.) Do you need help with any of the following activities?: Get to places outside of walking distance (can't drive alone, or take a bus/taxi alone), Go shopping for groceries or clothes, Do your housework or laundry, Prepare a meal, Handle your own money     15.) How confident are you that you can control and manage most of your health problems?: Somewhat confident         Vision and Hearing screens: Hearing Screening - Comments:: Patient cannot hear.   Vision Screening - Comments:: Patient sees an eye doctor.    Advance care planning documents on file - yes     Cognitive/Functional Status: no evidence of cognitive dysfunction by direct observation    Opioid Review: Maureen is not taking opioid medications.    Recent PHQ 2/9 Score:    PHQ 2:  Date Adult PHQ 2 Score Adult PHQ 2 Interpretation   6/12/2023  1 No further screening needed       PHQ 9:  Date Adult PHQ 9 Score Adult PHQ 9 Interpretation   6/12/2023 1 Minimal Depression       DEPRESSION ASSESSMENT/PLAN:  Depression screening is negative no further plan needed.     Body mass index is 23.63 kg/m².    BMI ASSESSMENT/PLAN:  Patient BMI is within normal range.       Needed follow up:  None    See orders.   See Patient Instructions section.   Return in about 1 year (around 6/12/2024) for Medicare Wellness Visit.      Pulmonary embolism, unspecified chronicity, unspecified pulmonary embolism type, unspecified whether acute cor pulmonale present

## 2023-09-05 NOTE — ED PROVIDER NOTE - NS HIV RISK FACTOR YES
Spoke with patient's mother in regards to lab results and provide instructions, mother had questions in regards to lab results mother was transfer to RN.  
Declined

## 2023-11-18 NOTE — PATIENT PROFILE ADULT - LAST BOWEL MOVEMENT DATE
67-year-old man with extensive past medical history as below presenting via walk-in as short of breath.  Further history unavailable due to severity of disease. 28-Jan-2020

## 2023-12-13 NOTE — PROGRESS NOTE ADULT - PROBLEM SELECTOR PLAN 3
Patient with worsening chimerism of bone marrow transplant  -Continue with Acyclovir PPx  -Heme/onc consult appreciated
given clinical decline, do not see further DDT as a possibility  - c/w symptom management.
- Noted to have large left pleural effusion, but is not hemodynamically stable for a thoracentesis and his platelets are low  - c/w diuresis as above
- Noted to have large left pleural effusion, but is not hemodynamically stable for a thoracentesis and his platelets are low  - c/w diuresis as above for now
MEL Gastelum

## 2023-12-19 NOTE — DISCHARGE NOTE PROVIDER - NSDCDCMDCOMP_GEN_ALL_CORE
This document is complete and the patient is ready for discharge. related to pulmonary embolism and PNA?

## 2024-01-04 NOTE — PROGRESS NOTE ADULT - ASSESSMENT
Mr. Delong is a 59 year old male, with previously diagnosed acute myeloid leukemia. s/p induction chemotherapy with Daunorubicin/Cytarabine and  HIDAC consolidation chemotherapy, now admitted for Haplo-identical stem cell transplant from son:    - NO evidence of volume overload at present.  He is on fluids and has been getting IV Lasix to maintain net negative.  He continues net negative.  - Continue to maintain strict I's and O's, and to monitor for signs of volume overload, which he is at risk for.  - No evidence of acute ischemia  - HR and BP are controlled  - Continue ACEI and BB at current doses as tolerated by BP.  Both have been held for borderline pressures.  To be dosed as Bp allows.    - Continue heparin gtt, and adjust rate per protocol  - Monitor and replete lytes  - To follow while admitted Abdomen soft, non-tender and non-distended, no rebound, no guarding and no masses. no hepatosplenomegaly.

## 2025-01-16 NOTE — PATIENT PROFILE ADULT - NSPROPOAPRESSUREINJURY_GEN_A_NUR
[de-identified] : no

## 2025-03-05 NOTE — PROGRESS NOTE ADULT - PROBLEM SELECTOR PLAN 3
HTN (hypertension) HTN (hypertension) HTN (hypertension) Continue acyclovir, voriconazole, atovaquone, protonix and ursodiol HTN (hypertension) HTN (hypertension)